# Patient Record
Sex: FEMALE | Race: WHITE | NOT HISPANIC OR LATINO | Employment: OTHER | URBAN - METROPOLITAN AREA
[De-identification: names, ages, dates, MRNs, and addresses within clinical notes are randomized per-mention and may not be internally consistent; named-entity substitution may affect disease eponyms.]

---

## 2017-01-05 ENCOUNTER — GENERIC CONVERSION - ENCOUNTER (OUTPATIENT)
Dept: OTHER | Facility: OTHER | Age: 57
End: 2017-01-05

## 2017-01-06 ENCOUNTER — GENERIC CONVERSION - ENCOUNTER (OUTPATIENT)
Dept: OTHER | Facility: OTHER | Age: 57
End: 2017-01-06

## 2017-02-14 ENCOUNTER — TRANSCRIBE ORDERS (OUTPATIENT)
Dept: ADMINISTRATIVE | Facility: HOSPITAL | Age: 57
End: 2017-02-14

## 2017-02-14 ENCOUNTER — APPOINTMENT (OUTPATIENT)
Dept: LAB | Facility: HOSPITAL | Age: 57
End: 2017-02-14
Payer: COMMERCIAL

## 2017-02-14 ENCOUNTER — GENERIC CONVERSION - ENCOUNTER (OUTPATIENT)
Dept: OTHER | Facility: OTHER | Age: 57
End: 2017-02-14

## 2017-02-14 ENCOUNTER — HOSPITAL ENCOUNTER (OUTPATIENT)
Dept: CT IMAGING | Facility: HOSPITAL | Age: 57
Discharge: HOME | End: 2017-02-14
Payer: COMMERCIAL

## 2017-02-14 ENCOUNTER — ALLSCRIPTS OFFICE VISIT (OUTPATIENT)
Dept: OTHER | Facility: OTHER | Age: 57
End: 2017-02-14

## 2017-02-14 DIAGNOSIS — R10.33 ABDOMINAL PAIN, PERIUMBILIC: Primary | ICD-10-CM

## 2017-02-14 DIAGNOSIS — R50.9 FEVER: ICD-10-CM

## 2017-02-14 DIAGNOSIS — R10.33 PERIUMBILICAL PAIN: ICD-10-CM

## 2017-02-14 DIAGNOSIS — R19.7 DIARRHEA: ICD-10-CM

## 2017-02-14 DIAGNOSIS — R10.33 ABDOMINAL PAIN, PERIUMBILIC: ICD-10-CM

## 2017-02-14 LAB
ALBUMIN SERPL BCP-MCNC: 3 G/DL (ref 3.5–5)
ALP SERPL-CCNC: 126 U/L (ref 46–116)
ALT SERPL W P-5'-P-CCNC: 49 U/L (ref 12–78)
AMYLASE SERPL-CCNC: 12 IU/L (ref 25–115)
ANION GAP SERPL CALCULATED.3IONS-SCNC: 9 MMOL/L (ref 4–13)
AST SERPL W P-5'-P-CCNC: 24 U/L (ref 5–45)
BASOPHILS # BLD AUTO: 0.05 THOUSANDS/ΜL (ref 0–0.1)
BASOPHILS NFR BLD AUTO: 0 % (ref 0–1)
BILIRUB SERPL-MCNC: 0.4 MG/DL (ref 0.2–1)
BUN SERPL-MCNC: 7 MG/DL (ref 5–25)
CALCIUM SERPL-MCNC: 8.5 MG/DL (ref 8.3–10.1)
CHLORIDE SERPL-SCNC: 93 MMOL/L (ref 100–108)
CO2 SERPL-SCNC: 32 MMOL/L (ref 21–32)
CREAT SERPL-MCNC: 0.73 MG/DL (ref 0.6–1.3)
EOSINOPHIL # BLD AUTO: 0.01 THOUSAND/ΜL (ref 0–0.61)
EOSINOPHIL NFR BLD AUTO: 0 % (ref 0–6)
ERYTHROCYTE [DISTWIDTH] IN BLOOD BY AUTOMATED COUNT: 17.1 % (ref 11.6–15.1)
ERYTHROCYTE [SEDIMENTATION RATE] IN BLOOD: 6 MM/HOUR (ref 0–15)
GFR SERPL CREATININE-BSD FRML MDRD: >60 ML/MIN/1.73SQ M
GLUCOSE SERPL-MCNC: 204 MG/DL (ref 65–140)
HCT VFR BLD AUTO: 35.5 % (ref 34.8–46.1)
HGB BLD-MCNC: 11.1 G/DL (ref 11.5–15.4)
LIPASE SERPL-CCNC: 64 U/L (ref 73–393)
LYMPHOCYTES # BLD AUTO: 0.77 THOUSANDS/ΜL (ref 0.6–4.47)
LYMPHOCYTES NFR BLD AUTO: 6 % (ref 14–44)
MCH RBC QN AUTO: 22.5 PG (ref 26.8–34.3)
MCHC RBC AUTO-ENTMCNC: 31.3 G/DL (ref 31.4–37.4)
MCV RBC AUTO: 72 FL (ref 82–98)
MONOCYTES # BLD AUTO: 0.58 THOUSAND/ΜL (ref 0.17–1.22)
MONOCYTES NFR BLD AUTO: 5 % (ref 4–12)
NEUTROPHILS # BLD AUTO: 10.57 THOUSANDS/ΜL (ref 1.85–7.62)
NEUTS SEG NFR BLD AUTO: 89 % (ref 43–75)
PLATELET # BLD AUTO: 246 THOUSANDS/UL (ref 149–390)
PMV BLD AUTO: 10.8 FL (ref 8.9–12.7)
POTASSIUM SERPL-SCNC: 3.1 MMOL/L (ref 3.5–5.3)
PROT SERPL-MCNC: 6.5 G/DL (ref 6.4–8.2)
RBC # BLD AUTO: 4.94 MILLION/UL (ref 3.81–5.12)
SODIUM SERPL-SCNC: 134 MMOL/L (ref 136–145)
WBC # BLD AUTO: 11.98 THOUSAND/UL (ref 4.31–10.16)

## 2017-02-14 PROCEDURE — 36415 COLL VENOUS BLD VENIPUNCTURE: CPT

## 2017-02-14 PROCEDURE — 85025 COMPLETE CBC W/AUTO DIFF WBC: CPT

## 2017-02-14 PROCEDURE — 74177 CT ABD & PELVIS W/CONTRAST: CPT

## 2017-02-14 PROCEDURE — 82150 ASSAY OF AMYLASE: CPT

## 2017-02-14 PROCEDURE — 85652 RBC SED RATE AUTOMATED: CPT

## 2017-02-14 PROCEDURE — 83690 ASSAY OF LIPASE: CPT

## 2017-02-14 PROCEDURE — 80053 COMPREHEN METABOLIC PANEL: CPT

## 2017-02-14 RX ADMIN — IOHEXOL 100 ML: 350 INJECTION, SOLUTION INTRAVENOUS at 16:02

## 2017-02-14 RX ADMIN — IOHEXOL 50 ML: 240 INJECTION, SOLUTION INTRATHECAL; INTRAVASCULAR; INTRAVENOUS; ORAL at 14:20

## 2017-02-22 ENCOUNTER — GENERIC CONVERSION - ENCOUNTER (OUTPATIENT)
Dept: OTHER | Facility: OTHER | Age: 57
End: 2017-02-22

## 2017-03-20 ENCOUNTER — GENERIC CONVERSION - ENCOUNTER (OUTPATIENT)
Dept: OTHER | Facility: OTHER | Age: 57
End: 2017-03-20

## 2017-03-21 ENCOUNTER — TRANSCRIBE ORDERS (OUTPATIENT)
Dept: ADMINISTRATIVE | Facility: HOSPITAL | Age: 57
End: 2017-03-21

## 2017-03-21 DIAGNOSIS — N63.0 LUMP OR MASS IN BREAST: Primary | ICD-10-CM

## 2017-03-21 DIAGNOSIS — Z12.31 SCREENING MAMMOGRAM FOR HIGH-RISK PATIENT: ICD-10-CM

## 2017-03-23 LAB
A/G RATIO (HISTORICAL): 2.2 (ref 1.2–2.2)
ALBUMIN SERPL BCP-MCNC: 4.2 G/DL (ref 3.5–5.5)
ALP SERPL-CCNC: 120 IU/L (ref 39–117)
ALT SERPL W P-5'-P-CCNC: 24 IU/L (ref 0–32)
AST SERPL W P-5'-P-CCNC: 20 IU/L (ref 0–40)
BASOPHILS # BLD AUTO: 0.1 X10E3/UL (ref 0–0.2)
BASOPHILS # BLD AUTO: 1 %
BILIRUB SERPL-MCNC: 0.4 MG/DL (ref 0–1.2)
BUN SERPL-MCNC: 5 MG/DL (ref 6–24)
BUN/CREA RATIO (HISTORICAL): 9 (ref 9–23)
CALCIUM SERPL-MCNC: 9.1 MG/DL (ref 8.7–10.2)
CHLORIDE SERPL-SCNC: 95 MMOL/L (ref 96–106)
CHOLEST SERPL-MCNC: 134 MG/DL (ref 100–199)
CHOLEST/HDLC SERPL: 2.7 RATIO UNITS (ref 0–4.4)
CO2 SERPL-SCNC: 26 MMOL/L (ref 18–29)
CREAT SERPL-MCNC: 0.58 MG/DL (ref 0.57–1)
CREATININE, URINE (HISTORICAL): 68.7 MG/DL
DEPRECATED RDW RBC AUTO: 19.1 % (ref 12.3–15.4)
EGFR AFRICAN AMERICAN (HISTORICAL): 117 ML/MIN/1.73
EGFR-AMERICAN CALC (HISTORICAL): 102 ML/MIN/1.73
EOSINOPHIL # BLD AUTO: 0.4 X10E3/UL (ref 0–0.4)
EOSINOPHIL # BLD AUTO: 7 %
GLUCOSE SERPL-MCNC: 323 MG/DL (ref 65–99)
HBA1C MFR BLD HPLC: 10.9 % (ref 4.8–5.6)
HCT VFR BLD AUTO: 40.3 % (ref 34–46.6)
HDLC SERPL-MCNC: 50 MG/DL
HGB BLD-MCNC: 12.1 G/DL (ref 11.1–15.9)
IMM.GRANULOCYTES (CD4/8) (HISTORICAL): 0 %
IMM.GRANULOCYTES (CD4/8) (HISTORICAL): 0 X10E3/UL (ref 0–0.1)
LDLC SERPL CALC-MCNC: 58 MG/DL (ref 0–99)
LYMPHOCYTES # BLD AUTO: 1.5 X10E3/UL (ref 0.7–3.1)
LYMPHOCYTES # BLD AUTO: 27 %
MCH RBC QN AUTO: 22.3 PG (ref 26.6–33)
MCHC RBC AUTO-ENTMCNC: 30 G/DL (ref 31.5–35.7)
MCV RBC AUTO: 74 FL (ref 79–97)
MICROALBUM.,U,RANDOM (HISTORICAL): 5.3 UG/ML
MICROALBUMIN/CREATININE RATIO (HISTORICAL): 7.7 MG/G CREAT (ref 0–30)
MONOCYTES # BLD AUTO: 0.6 X10E3/UL (ref 0.1–0.9)
MONOCYTES (HISTORICAL): 11 %
NEUTROPHILS # BLD AUTO: 2.9 X10E3/UL (ref 1.4–7)
NEUTROPHILS # BLD AUTO: 54 %
PLATELET # BLD AUTO: 289 X10E3/UL (ref 150–379)
POTASSIUM SERPL-SCNC: 4.2 MMOL/L (ref 3.5–5.2)
RBC (HISTORICAL): 5.43 X10E6/UL (ref 3.77–5.28)
SODIUM SERPL-SCNC: 140 MMOL/L (ref 134–144)
TOT. GLOBULIN, SERUM (HISTORICAL): 1.9 G/DL (ref 1.5–4.5)
TOTAL PROTEIN (HISTORICAL): 6.1 G/DL (ref 6–8.5)
TRIGL SERPL-MCNC: 132 MG/DL (ref 0–149)
VLDLC SERPL CALC-MCNC: 26 MG/DL (ref 5–40)
WBC # BLD AUTO: 5.5 X10E3/UL (ref 3.4–10.8)

## 2017-03-24 ENCOUNTER — HOSPITAL ENCOUNTER (OUTPATIENT)
Dept: MAMMOGRAPHY | Facility: CLINIC | Age: 57
Discharge: HOME | End: 2017-03-24
Payer: COMMERCIAL

## 2017-03-24 ENCOUNTER — GENERIC CONVERSION - ENCOUNTER (OUTPATIENT)
Dept: OTHER | Facility: OTHER | Age: 57
End: 2017-03-24

## 2017-03-24 ENCOUNTER — HOSPITAL ENCOUNTER (OUTPATIENT)
Dept: ULTRASOUND IMAGING | Facility: CLINIC | Age: 57
Discharge: HOME | End: 2017-03-24
Payer: COMMERCIAL

## 2017-03-24 DIAGNOSIS — Z12.31 SCREENING MAMMOGRAM FOR HIGH-RISK PATIENT: ICD-10-CM

## 2017-03-24 DIAGNOSIS — N63.0 BREAST LUMP: ICD-10-CM

## 2017-03-24 DIAGNOSIS — N63.0 LUMP OR MASS IN BREAST: ICD-10-CM

## 2017-03-24 PROCEDURE — 76642 ULTRASOUND BREAST LIMITED: CPT

## 2017-03-24 PROCEDURE — G0202 SCR MAMMO BI INCL CAD: HCPCS

## 2017-03-24 PROCEDURE — G0206 DX MAMMO INCL CAD UNI: HCPCS

## 2017-03-29 ENCOUNTER — ALLSCRIPTS OFFICE VISIT (OUTPATIENT)
Dept: OTHER | Facility: OTHER | Age: 57
End: 2017-03-29

## 2017-03-30 ENCOUNTER — GENERIC CONVERSION - ENCOUNTER (OUTPATIENT)
Dept: OTHER | Facility: OTHER | Age: 57
End: 2017-03-30

## 2017-04-03 LAB
ALLERGEN CAT EPITHELIUM-DANDER (HISTORICAL): <0.1 KU/L
ALLERGEN ELM (HISTORICAL): <0.1 KU/L
ALLERGEN MAPLE/BOX ELDER (HISTORICAL): <0.1 KU/L
ALLERGEN MUGWORT IGE (HISTORICAL): <0.1 KU/L
ALLERGEN OAK, WHITE (HISTORICAL): <0.1 KU/L
ALLERGEN ROUGH PIGWEED (W14) IGE (HISTORICAL): <0.1 KU/L
ALLERGEN SHEEP SORREL (W18) IGE (HISTORICAL): <0.1 KU/L
ALLERGEN WHITE MULBERRY (HISTORICAL): <0.1 KU/L
ALLERGEN, COMMON SILVER BIRCH (HISTORICAL): <0.1 KU/L
ALLERGEN, COTTONWOOD (HISTORICAL): <0.1 KU/L
ALTERNARIA ALTERNATA (HISTORICAL): <0.1 KU/L
ASPERGILLUS FUMIGATUS (HISTORICAL): <0.1 KU/L
BERMUDA GRASS (HISTORICAL): <0.1 KU/L
CLADOSPORIUM HERBARUM (HISTORICAL): <0.1 KU/L
CLASS (HISTORICAL): NORMAL
COCKROACH (HISTORICAL): <0.1 KU/L
COMMON RAGWEED (HISTORICAL): <0.1 KU/L
D. FARINAE (HISTORICAL): <0.1 KU/L
D. PTERONYSSINUS (HISTORICAL): <0.1 KU/L
DOG DANDER (HISTORICAL): <0.1 KU/L
IMMUNOGLOBULIN E, TOTAL (HISTORICAL): 31 IU/ML (ref 0–100)
MOUNTAIN CEDAR TREE (HISTORICAL): <0.1 KU/L
MOUSE URINE (HISTORICAL): <0.1 KU/L
PENICILLIUM CHRYSOGENUM (HISTORICAL): <0.1 KU/L
SYCAMORE TREE (HISTORICAL): <0.1 KU/L
TIMOTHY GRASS (HISTORICAL): <0.1 KU/L
WALNUT (HISTORICAL): <0.1 KU/L
WHITE ASH TREE (HISTORICAL): <0.1 KU/L

## 2017-04-10 ENCOUNTER — GENERIC CONVERSION - ENCOUNTER (OUTPATIENT)
Dept: OTHER | Facility: OTHER | Age: 57
End: 2017-04-10

## 2017-04-24 ENCOUNTER — GENERIC CONVERSION - ENCOUNTER (OUTPATIENT)
Dept: OTHER | Facility: OTHER | Age: 57
End: 2017-04-24

## 2017-05-04 ENCOUNTER — GENERIC CONVERSION - ENCOUNTER (OUTPATIENT)
Dept: OTHER | Facility: OTHER | Age: 57
End: 2017-05-04

## 2017-06-08 ENCOUNTER — GENERIC CONVERSION - ENCOUNTER (OUTPATIENT)
Dept: OTHER | Facility: OTHER | Age: 57
End: 2017-06-08

## 2017-06-15 ENCOUNTER — APPOINTMENT (OUTPATIENT)
Dept: PHYSICAL THERAPY | Facility: CLINIC | Age: 57
End: 2017-06-15
Payer: COMMERCIAL

## 2017-06-15 PROCEDURE — 97162 PT EVAL MOD COMPLEX 30 MIN: CPT

## 2017-06-15 PROCEDURE — 97140 MANUAL THERAPY 1/> REGIONS: CPT

## 2017-06-15 PROCEDURE — 97110 THERAPEUTIC EXERCISES: CPT

## 2017-06-20 ENCOUNTER — APPOINTMENT (OUTPATIENT)
Dept: PHYSICAL THERAPY | Facility: CLINIC | Age: 57
End: 2017-06-20
Payer: COMMERCIAL

## 2017-06-20 PROCEDURE — 97530 THERAPEUTIC ACTIVITIES: CPT

## 2017-06-20 PROCEDURE — 97140 MANUAL THERAPY 1/> REGIONS: CPT

## 2017-06-20 PROCEDURE — 97010 HOT OR COLD PACKS THERAPY: CPT

## 2017-06-22 ENCOUNTER — APPOINTMENT (OUTPATIENT)
Dept: PHYSICAL THERAPY | Facility: CLINIC | Age: 57
End: 2017-06-22
Payer: COMMERCIAL

## 2017-06-22 PROCEDURE — 97140 MANUAL THERAPY 1/> REGIONS: CPT

## 2017-06-22 PROCEDURE — 97110 THERAPEUTIC EXERCISES: CPT

## 2017-06-23 ENCOUNTER — ALLSCRIPTS OFFICE VISIT (OUTPATIENT)
Dept: OTHER | Facility: OTHER | Age: 57
End: 2017-06-23

## 2017-06-26 LAB
A/G RATIO (HISTORICAL): 1.9 (ref 1.2–2.2)
ALBUMIN SERPL BCP-MCNC: 4.4 G/DL (ref 3.5–5.5)
ALP SERPL-CCNC: 124 IU/L (ref 39–117)
ALT SERPL W P-5'-P-CCNC: 35 IU/L (ref 0–32)
AST SERPL W P-5'-P-CCNC: 13 IU/L (ref 0–40)
BASOPHILS # BLD AUTO: 0.1 X10E3/UL (ref 0–0.2)
BASOPHILS # BLD AUTO: 1 %
BILIRUB SERPL-MCNC: 0.4 MG/DL (ref 0–1.2)
BUN SERPL-MCNC: 8 MG/DL (ref 6–24)
BUN/CREA RATIO (HISTORICAL): 14 (ref 9–23)
CALCIUM SERPL-MCNC: 9.5 MG/DL (ref 8.7–10.2)
CHLORIDE SERPL-SCNC: 95 MMOL/L (ref 96–106)
CO2 SERPL-SCNC: 27 MMOL/L (ref 18–29)
CREAT SERPL-MCNC: 0.56 MG/DL (ref 0.57–1)
DEPRECATED RDW RBC AUTO: 14.9 % (ref 12.3–15.4)
EGFR AFRICAN AMERICAN (HISTORICAL): 119 ML/MIN/1.73
EGFR-AMERICAN CALC (HISTORICAL): 103 ML/MIN/1.73
EOSINOPHIL # BLD AUTO: 0.5 X10E3/UL (ref 0–0.4)
EOSINOPHIL # BLD AUTO: 5 %
GLUCOSE SERPL-MCNC: 165 MG/DL (ref 65–99)
HBA1C MFR BLD HPLC: 9 % (ref 4.8–5.6)
HCT VFR BLD AUTO: 39.2 % (ref 34–46.6)
HGB BLD-MCNC: 12.9 G/DL (ref 11.1–15.9)
IMM.GRANULOCYTES (CD4/8) (HISTORICAL): 0 %
IMM.GRANULOCYTES (CD4/8) (HISTORICAL): 0 X10E3/UL (ref 0–0.1)
LYMPHOCYTES # BLD AUTO: 1.9 X10E3/UL (ref 0.7–3.1)
LYMPHOCYTES # BLD AUTO: 20 %
MCH RBC QN AUTO: 25 PG (ref 26.6–33)
MCHC RBC AUTO-ENTMCNC: 32.9 G/DL (ref 31.5–35.7)
MCV RBC AUTO: 76 FL (ref 79–97)
MONOCYTES # BLD AUTO: 0.6 X10E3/UL (ref 0.1–0.9)
MONOCYTES (HISTORICAL): 6 %
NEUTROPHILS # BLD AUTO: 6.6 X10E3/UL (ref 1.4–7)
NEUTROPHILS # BLD AUTO: 68 %
PLATELET # BLD AUTO: 365 X10E3/UL (ref 150–379)
POTASSIUM SERPL-SCNC: 4.2 MMOL/L (ref 3.5–5.2)
RBC (HISTORICAL): 5.17 X10E6/UL (ref 3.77–5.28)
SODIUM SERPL-SCNC: 140 MMOL/L (ref 134–144)
TOT. GLOBULIN, SERUM (HISTORICAL): 2.3 G/DL (ref 1.5–4.5)
TOTAL PROTEIN (HISTORICAL): 6.7 G/DL (ref 6–8.5)
WBC # BLD AUTO: 9.7 X10E3/UL (ref 3.4–10.8)

## 2017-06-27 ENCOUNTER — APPOINTMENT (OUTPATIENT)
Dept: PHYSICAL THERAPY | Facility: CLINIC | Age: 57
End: 2017-06-27
Payer: COMMERCIAL

## 2017-06-27 PROCEDURE — G0283 ELEC STIM OTHER THAN WOUND: HCPCS

## 2017-06-27 PROCEDURE — 97140 MANUAL THERAPY 1/> REGIONS: CPT

## 2017-06-27 PROCEDURE — 97110 THERAPEUTIC EXERCISES: CPT

## 2017-06-27 PROCEDURE — 97014 ELECTRIC STIMULATION THERAPY: CPT

## 2017-06-29 ENCOUNTER — APPOINTMENT (OUTPATIENT)
Dept: PHYSICAL THERAPY | Facility: CLINIC | Age: 57
End: 2017-06-29
Payer: COMMERCIAL

## 2017-07-03 ENCOUNTER — APPOINTMENT (OUTPATIENT)
Dept: PHYSICAL THERAPY | Facility: CLINIC | Age: 57
End: 2017-07-03
Payer: COMMERCIAL

## 2017-07-05 ENCOUNTER — GENERIC CONVERSION - ENCOUNTER (OUTPATIENT)
Dept: OTHER | Facility: OTHER | Age: 57
End: 2017-07-05

## 2017-07-06 ENCOUNTER — APPOINTMENT (OUTPATIENT)
Dept: PHYSICAL THERAPY | Facility: CLINIC | Age: 57
End: 2017-07-06
Payer: COMMERCIAL

## 2017-07-06 PROCEDURE — 97110 THERAPEUTIC EXERCISES: CPT

## 2017-07-06 PROCEDURE — 97014 ELECTRIC STIMULATION THERAPY: CPT

## 2017-07-06 PROCEDURE — 97010 HOT OR COLD PACKS THERAPY: CPT

## 2017-07-06 PROCEDURE — G0283 ELEC STIM OTHER THAN WOUND: HCPCS

## 2017-07-06 PROCEDURE — 97140 MANUAL THERAPY 1/> REGIONS: CPT

## 2017-07-07 ENCOUNTER — ALLSCRIPTS OFFICE VISIT (OUTPATIENT)
Dept: OTHER | Facility: OTHER | Age: 57
End: 2017-07-07

## 2017-07-11 ENCOUNTER — APPOINTMENT (OUTPATIENT)
Dept: PHYSICAL THERAPY | Facility: CLINIC | Age: 57
End: 2017-07-11
Payer: COMMERCIAL

## 2017-07-11 ENCOUNTER — GENERIC CONVERSION - ENCOUNTER (OUTPATIENT)
Dept: OTHER | Facility: OTHER | Age: 57
End: 2017-07-11

## 2017-07-11 PROCEDURE — 97010 HOT OR COLD PACKS THERAPY: CPT

## 2017-07-11 PROCEDURE — G0283 ELEC STIM OTHER THAN WOUND: HCPCS

## 2017-07-11 PROCEDURE — 97110 THERAPEUTIC EXERCISES: CPT

## 2017-07-11 PROCEDURE — 97140 MANUAL THERAPY 1/> REGIONS: CPT

## 2017-07-11 PROCEDURE — 97014 ELECTRIC STIMULATION THERAPY: CPT

## 2017-07-13 ENCOUNTER — APPOINTMENT (OUTPATIENT)
Dept: PHYSICAL THERAPY | Facility: CLINIC | Age: 57
End: 2017-07-13
Payer: COMMERCIAL

## 2017-07-13 PROCEDURE — 97140 MANUAL THERAPY 1/> REGIONS: CPT

## 2017-07-13 PROCEDURE — 97110 THERAPEUTIC EXERCISES: CPT

## 2017-07-15 ENCOUNTER — GENERIC CONVERSION - ENCOUNTER (OUTPATIENT)
Dept: OTHER | Facility: OTHER | Age: 57
End: 2017-07-15

## 2017-07-18 ENCOUNTER — APPOINTMENT (OUTPATIENT)
Dept: PHYSICAL THERAPY | Facility: CLINIC | Age: 57
End: 2017-07-18
Payer: COMMERCIAL

## 2017-07-18 PROCEDURE — 97110 THERAPEUTIC EXERCISES: CPT

## 2017-07-18 PROCEDURE — 97140 MANUAL THERAPY 1/> REGIONS: CPT

## 2017-07-19 ENCOUNTER — GENERIC CONVERSION - ENCOUNTER (OUTPATIENT)
Dept: OTHER | Facility: OTHER | Age: 57
End: 2017-07-19

## 2017-07-20 ENCOUNTER — APPOINTMENT (OUTPATIENT)
Dept: PHYSICAL THERAPY | Facility: CLINIC | Age: 57
End: 2017-07-20
Payer: COMMERCIAL

## 2017-07-20 PROCEDURE — 97140 MANUAL THERAPY 1/> REGIONS: CPT

## 2017-07-20 PROCEDURE — 97110 THERAPEUTIC EXERCISES: CPT

## 2017-07-20 PROCEDURE — 97010 HOT OR COLD PACKS THERAPY: CPT

## 2017-07-25 ENCOUNTER — APPOINTMENT (OUTPATIENT)
Dept: PHYSICAL THERAPY | Facility: CLINIC | Age: 57
End: 2017-07-25
Payer: COMMERCIAL

## 2017-07-26 ENCOUNTER — APPOINTMENT (OUTPATIENT)
Dept: PHYSICAL THERAPY | Facility: CLINIC | Age: 57
End: 2017-07-26
Payer: COMMERCIAL

## 2017-07-26 PROCEDURE — 97110 THERAPEUTIC EXERCISES: CPT

## 2017-07-26 PROCEDURE — 97140 MANUAL THERAPY 1/> REGIONS: CPT

## 2017-07-27 ENCOUNTER — APPOINTMENT (OUTPATIENT)
Dept: PHYSICAL THERAPY | Facility: CLINIC | Age: 57
End: 2017-07-27
Payer: COMMERCIAL

## 2017-07-27 PROCEDURE — 97110 THERAPEUTIC EXERCISES: CPT

## 2017-07-27 PROCEDURE — 97140 MANUAL THERAPY 1/> REGIONS: CPT

## 2017-07-27 PROCEDURE — 97010 HOT OR COLD PACKS THERAPY: CPT

## 2017-08-01 ENCOUNTER — APPOINTMENT (OUTPATIENT)
Dept: PHYSICAL THERAPY | Facility: CLINIC | Age: 57
End: 2017-08-01
Payer: COMMERCIAL

## 2017-08-01 ENCOUNTER — GENERIC CONVERSION - ENCOUNTER (OUTPATIENT)
Dept: OTHER | Facility: OTHER | Age: 57
End: 2017-08-01

## 2017-08-03 ENCOUNTER — APPOINTMENT (OUTPATIENT)
Dept: PHYSICAL THERAPY | Facility: CLINIC | Age: 57
End: 2017-08-03
Payer: COMMERCIAL

## 2017-08-04 ENCOUNTER — GENERIC CONVERSION - ENCOUNTER (OUTPATIENT)
Dept: OTHER | Facility: OTHER | Age: 57
End: 2017-08-04

## 2017-08-07 ENCOUNTER — ALLSCRIPTS OFFICE VISIT (OUTPATIENT)
Dept: OTHER | Facility: OTHER | Age: 57
End: 2017-08-07

## 2017-08-07 DIAGNOSIS — E11.9 TYPE 2 DIABETES MELLITUS WITHOUT COMPLICATIONS (HCC): ICD-10-CM

## 2017-08-08 ENCOUNTER — APPOINTMENT (OUTPATIENT)
Dept: PHYSICAL THERAPY | Facility: CLINIC | Age: 57
End: 2017-08-08
Payer: COMMERCIAL

## 2017-08-08 PROCEDURE — 97110 THERAPEUTIC EXERCISES: CPT

## 2017-08-08 PROCEDURE — 97010 HOT OR COLD PACKS THERAPY: CPT

## 2017-08-08 PROCEDURE — 97140 MANUAL THERAPY 1/> REGIONS: CPT

## 2017-08-10 ENCOUNTER — APPOINTMENT (OUTPATIENT)
Dept: PHYSICAL THERAPY | Facility: CLINIC | Age: 57
End: 2017-08-10
Payer: COMMERCIAL

## 2017-08-10 PROCEDURE — 97010 HOT OR COLD PACKS THERAPY: CPT

## 2017-08-10 PROCEDURE — 97140 MANUAL THERAPY 1/> REGIONS: CPT

## 2017-08-10 PROCEDURE — 97110 THERAPEUTIC EXERCISES: CPT

## 2017-08-15 ENCOUNTER — APPOINTMENT (OUTPATIENT)
Dept: PHYSICAL THERAPY | Facility: CLINIC | Age: 57
End: 2017-08-15
Payer: COMMERCIAL

## 2017-08-15 PROCEDURE — 97110 THERAPEUTIC EXERCISES: CPT

## 2017-08-15 PROCEDURE — 97140 MANUAL THERAPY 1/> REGIONS: CPT

## 2017-08-15 PROCEDURE — 97010 HOT OR COLD PACKS THERAPY: CPT

## 2017-08-17 ENCOUNTER — APPOINTMENT (OUTPATIENT)
Dept: PHYSICAL THERAPY | Facility: CLINIC | Age: 57
End: 2017-08-17
Payer: COMMERCIAL

## 2017-08-17 PROCEDURE — 97140 MANUAL THERAPY 1/> REGIONS: CPT

## 2017-08-17 PROCEDURE — 97010 HOT OR COLD PACKS THERAPY: CPT

## 2017-08-17 PROCEDURE — 97110 THERAPEUTIC EXERCISES: CPT

## 2017-08-22 ENCOUNTER — APPOINTMENT (OUTPATIENT)
Dept: PHYSICAL THERAPY | Facility: CLINIC | Age: 57
End: 2017-08-22
Payer: COMMERCIAL

## 2017-08-24 ENCOUNTER — APPOINTMENT (OUTPATIENT)
Dept: PHYSICAL THERAPY | Facility: CLINIC | Age: 57
End: 2017-08-24
Payer: COMMERCIAL

## 2017-08-24 PROCEDURE — G0283 ELEC STIM OTHER THAN WOUND: HCPCS

## 2017-08-24 PROCEDURE — 97010 HOT OR COLD PACKS THERAPY: CPT

## 2017-08-24 PROCEDURE — 97110 THERAPEUTIC EXERCISES: CPT

## 2017-08-24 PROCEDURE — 97140 MANUAL THERAPY 1/> REGIONS: CPT

## 2017-08-24 PROCEDURE — 97014 ELECTRIC STIMULATION THERAPY: CPT

## 2017-08-29 ENCOUNTER — APPOINTMENT (OUTPATIENT)
Dept: PHYSICAL THERAPY | Facility: CLINIC | Age: 57
End: 2017-08-29
Payer: COMMERCIAL

## 2017-08-30 ENCOUNTER — ALLSCRIPTS OFFICE VISIT (OUTPATIENT)
Dept: OTHER | Facility: OTHER | Age: 57
End: 2017-08-30

## 2017-08-31 ENCOUNTER — APPOINTMENT (OUTPATIENT)
Dept: PHYSICAL THERAPY | Facility: CLINIC | Age: 57
End: 2017-08-31
Payer: COMMERCIAL

## 2017-08-31 PROCEDURE — 97140 MANUAL THERAPY 1/> REGIONS: CPT

## 2017-08-31 PROCEDURE — 97110 THERAPEUTIC EXERCISES: CPT

## 2017-08-31 PROCEDURE — 97010 HOT OR COLD PACKS THERAPY: CPT

## 2017-09-05 ENCOUNTER — APPOINTMENT (OUTPATIENT)
Dept: PHYSICAL THERAPY | Facility: CLINIC | Age: 57
End: 2017-09-05
Payer: COMMERCIAL

## 2017-09-07 ENCOUNTER — APPOINTMENT (OUTPATIENT)
Dept: PHYSICAL THERAPY | Facility: CLINIC | Age: 57
End: 2017-09-07
Payer: COMMERCIAL

## 2017-09-07 ENCOUNTER — APPOINTMENT (EMERGENCY)
Dept: CT IMAGING | Facility: HOSPITAL | Age: 57
End: 2017-09-07
Payer: COMMERCIAL

## 2017-09-07 ENCOUNTER — HOSPITAL ENCOUNTER (EMERGENCY)
Facility: HOSPITAL | Age: 57
Discharge: HOME | End: 2017-09-07
Attending: EMERGENCY MEDICINE
Payer: COMMERCIAL

## 2017-09-07 ENCOUNTER — APPOINTMENT (EMERGENCY)
Dept: RADIOLOGY | Facility: HOSPITAL | Age: 57
End: 2017-09-07
Payer: COMMERCIAL

## 2017-09-07 VITALS
HEART RATE: 76 BPM | HEIGHT: 63 IN | DIASTOLIC BLOOD PRESSURE: 56 MMHG | SYSTOLIC BLOOD PRESSURE: 112 MMHG | BODY MASS INDEX: 38.09 KG/M2 | OXYGEN SATURATION: 97 % | TEMPERATURE: 98.4 F | WEIGHT: 215 LBS | RESPIRATION RATE: 18 BRPM

## 2017-09-07 DIAGNOSIS — M54.2 NECK PAIN: ICD-10-CM

## 2017-09-07 DIAGNOSIS — R53.1 WEAKNESS: Primary | ICD-10-CM

## 2017-09-07 DIAGNOSIS — R42 DIZZINESS: ICD-10-CM

## 2017-09-07 LAB
ALBUMIN SERPL BCP-MCNC: 3.8 G/DL (ref 3.5–5)
ALP SERPL-CCNC: 122 U/L (ref 46–116)
ALT SERPL W P-5'-P-CCNC: 27 U/L (ref 12–78)
ANION GAP SERPL CALCULATED.3IONS-SCNC: 8 MMOL/L (ref 4–13)
APTT PPP: 29 SECONDS (ref 23–35)
AST SERPL W P-5'-P-CCNC: 14 U/L (ref 5–45)
BASOPHILS # BLD AUTO: 0.05 THOUSANDS/ΜL (ref 0–0.1)
BASOPHILS NFR BLD AUTO: 1 % (ref 0–1)
BILIRUB SERPL-MCNC: 0.3 MG/DL (ref 0.2–1)
BUN SERPL-MCNC: 16 MG/DL (ref 5–25)
CALCIUM SERPL-MCNC: 9.1 MG/DL (ref 8.3–10.1)
CHLORIDE SERPL-SCNC: 99 MMOL/L (ref 100–108)
CK SERPL-CCNC: 39 U/L (ref 26–192)
CO2 SERPL-SCNC: 32 MMOL/L (ref 21–32)
CORTIS SERPL-MCNC: 6.8 UG/DL
CREAT SERPL-MCNC: 0.88 MG/DL (ref 0.6–1.3)
EOSINOPHIL # BLD AUTO: 0.52 THOUSAND/ΜL (ref 0–0.61)
EOSINOPHIL NFR BLD AUTO: 5 % (ref 0–6)
ERYTHROCYTE [DISTWIDTH] IN BLOOD BY AUTOMATED COUNT: 16.7 % (ref 11.6–15.1)
ERYTHROCYTE [SEDIMENTATION RATE] IN BLOOD: 2 MM/HOUR (ref 0–15)
GFR SERPL CREATININE-BSD FRML MDRD: 73 ML/MIN/1.73SQ M
GLUCOSE SERPL-MCNC: 104 MG/DL (ref 65–140)
HCT VFR BLD AUTO: 39.6 % (ref 34.8–46.1)
HGB BLD-MCNC: 13 G/DL (ref 11.5–15.4)
INR PPP: 1 (ref 0.86–1.16)
LYMPHOCYTES # BLD AUTO: 2.72 THOUSANDS/ΜL (ref 0.6–4.47)
LYMPHOCYTES NFR BLD AUTO: 25 % (ref 14–44)
MAGNESIUM SERPL-MCNC: 2.1 MG/DL (ref 1.6–2.6)
MCH RBC QN AUTO: 24.7 PG (ref 26.8–34.3)
MCHC RBC AUTO-ENTMCNC: 32.8 G/DL (ref 31.4–37.4)
MCV RBC AUTO: 75 FL (ref 82–98)
MONOCYTES # BLD AUTO: 0.87 THOUSAND/ΜL (ref 0.17–1.22)
MONOCYTES NFR BLD AUTO: 8 % (ref 4–12)
NEUTROPHILS # BLD AUTO: 6.6 THOUSANDS/ΜL (ref 1.85–7.62)
NEUTS SEG NFR BLD AUTO: 61 % (ref 43–75)
PHOSPHATE SERPL-MCNC: 4.9 MG/DL (ref 2.7–4.5)
PLATELET # BLD AUTO: 312 THOUSANDS/UL (ref 149–390)
PMV BLD AUTO: 10.8 FL (ref 8.9–12.7)
POTASSIUM SERPL-SCNC: 3.7 MMOL/L (ref 3.5–5.3)
PROT SERPL-MCNC: 7.2 G/DL (ref 6.4–8.2)
PROTHROMBIN TIME: 13 SECONDS (ref 12.1–14.4)
RBC # BLD AUTO: 5.27 MILLION/UL (ref 3.81–5.12)
SODIUM SERPL-SCNC: 139 MMOL/L (ref 136–145)
T4 FREE SERPL-MCNC: 1.14 NG/DL (ref 0.76–1.46)
TSH SERPL DL<=0.05 MIU/L-ACNC: 1.52 UIU/ML (ref 0.36–3.74)
WBC # BLD AUTO: 10.76 THOUSAND/UL (ref 4.31–10.16)

## 2017-09-07 PROCEDURE — 36415 COLL VENOUS BLD VENIPUNCTURE: CPT | Performed by: EMERGENCY MEDICINE

## 2017-09-07 PROCEDURE — 83735 ASSAY OF MAGNESIUM: CPT | Performed by: EMERGENCY MEDICINE

## 2017-09-07 PROCEDURE — 93005 ELECTROCARDIOGRAM TRACING: CPT | Performed by: EMERGENCY MEDICINE

## 2017-09-07 PROCEDURE — 72125 CT NECK SPINE W/O DYE: CPT

## 2017-09-07 PROCEDURE — 99285 EMERGENCY DEPT VISIT HI MDM: CPT

## 2017-09-07 PROCEDURE — 80053 COMPREHEN METABOLIC PANEL: CPT | Performed by: EMERGENCY MEDICINE

## 2017-09-07 PROCEDURE — 71010 HB CHEST X-RAY 1 VIEW FRONTAL (PORTABLE): CPT

## 2017-09-07 PROCEDURE — 70450 CT HEAD/BRAIN W/O DYE: CPT

## 2017-09-07 PROCEDURE — 85025 COMPLETE CBC W/AUTO DIFF WBC: CPT | Performed by: EMERGENCY MEDICINE

## 2017-09-07 PROCEDURE — 84100 ASSAY OF PHOSPHORUS: CPT | Performed by: EMERGENCY MEDICINE

## 2017-09-07 PROCEDURE — 85730 THROMBOPLASTIN TIME PARTIAL: CPT | Performed by: EMERGENCY MEDICINE

## 2017-09-07 PROCEDURE — 82550 ASSAY OF CK (CPK): CPT | Performed by: EMERGENCY MEDICINE

## 2017-09-07 PROCEDURE — 85610 PROTHROMBIN TIME: CPT | Performed by: EMERGENCY MEDICINE

## 2017-09-07 PROCEDURE — 85652 RBC SED RATE AUTOMATED: CPT | Performed by: EMERGENCY MEDICINE

## 2017-09-07 PROCEDURE — 82533 TOTAL CORTISOL: CPT | Performed by: EMERGENCY MEDICINE

## 2017-09-07 PROCEDURE — 86618 LYME DISEASE ANTIBODY: CPT | Performed by: EMERGENCY MEDICINE

## 2017-09-07 PROCEDURE — 84443 ASSAY THYROID STIM HORMONE: CPT | Performed by: EMERGENCY MEDICINE

## 2017-09-07 PROCEDURE — 84439 ASSAY OF FREE THYROXINE: CPT | Performed by: EMERGENCY MEDICINE

## 2017-09-08 LAB
ATRIAL RATE: 71 BPM
B BURGDOR IGG SER IA-ACNC: 0.1
B BURGDOR IGM SER IA-ACNC: 0.14
P AXIS: 81 DEGREES
PR INTERVAL: 158 MS
QRS AXIS: 61 DEGREES
QRSD INTERVAL: 78 MS
QT INTERVAL: 420 MS
QTC INTERVAL: 456 MS
T WAVE AXIS: 70 DEGREES
VENTRICULAR RATE: 71 BPM

## 2017-09-13 ENCOUNTER — GENERIC CONVERSION - ENCOUNTER (OUTPATIENT)
Dept: OTHER | Facility: OTHER | Age: 57
End: 2017-09-13

## 2017-09-13 DIAGNOSIS — M54.12 RADICULOPATHY OF CERVICAL REGION: ICD-10-CM

## 2017-09-15 ENCOUNTER — TRANSCRIBE ORDERS (OUTPATIENT)
Dept: ADMINISTRATIVE | Facility: HOSPITAL | Age: 57
End: 2017-09-15

## 2017-09-15 ENCOUNTER — HOSPITAL ENCOUNTER (OUTPATIENT)
Dept: RADIOLOGY | Facility: HOSPITAL | Age: 57
Discharge: HOME | End: 2017-09-15
Payer: COMMERCIAL

## 2017-09-15 DIAGNOSIS — M54.12 RADICULOPATHY OF CERVICAL REGION: ICD-10-CM

## 2017-09-15 PROCEDURE — 72050 X-RAY EXAM NECK SPINE 4/5VWS: CPT

## 2017-09-18 ENCOUNTER — GENERIC CONVERSION - ENCOUNTER (OUTPATIENT)
Dept: OTHER | Facility: OTHER | Age: 57
End: 2017-09-18

## 2017-09-18 ENCOUNTER — APPOINTMENT (OUTPATIENT)
Dept: PHYSICAL THERAPY | Facility: CLINIC | Age: 57
End: 2017-09-18
Payer: COMMERCIAL

## 2017-09-18 PROCEDURE — G8991 OTHER PT/OT GOAL STATUS: HCPCS

## 2017-09-18 PROCEDURE — 97140 MANUAL THERAPY 1/> REGIONS: CPT

## 2017-09-18 PROCEDURE — G8990 OTHER PT/OT CURRENT STATUS: HCPCS

## 2017-09-18 PROCEDURE — 97035 APP MDLTY 1+ULTRASOUND EA 15: CPT

## 2017-09-18 PROCEDURE — 97164 PT RE-EVAL EST PLAN CARE: CPT

## 2017-09-18 PROCEDURE — 97110 THERAPEUTIC EXERCISES: CPT

## 2017-09-20 ENCOUNTER — GENERIC CONVERSION - ENCOUNTER (OUTPATIENT)
Dept: OTHER | Facility: OTHER | Age: 57
End: 2017-09-20

## 2017-09-20 ENCOUNTER — ALLSCRIPTS OFFICE VISIT (OUTPATIENT)
Dept: OTHER | Facility: OTHER | Age: 57
End: 2017-09-20

## 2017-09-22 LAB
BASOPHILS # BLD AUTO: 0.1 X10E3/UL (ref 0–0.2)
BASOPHILS # BLD AUTO: 1 %
DEPRECATED RDW RBC AUTO: 16.6 % (ref 12.3–15.4)
EOSINOPHIL # BLD AUTO: 0.6 X10E3/UL (ref 0–0.4)
EOSINOPHIL # BLD AUTO: 6 %
HBA1C MFR BLD HPLC: 6.8 % (ref 4.8–5.6)
HCT VFR BLD AUTO: 39.7 % (ref 34–46.6)
HGB BLD-MCNC: 13 G/DL (ref 11.1–15.9)
IMM.GRANULOCYTES (CD4/8) (HISTORICAL): 0 %
IMM.GRANULOCYTES (CD4/8) (HISTORICAL): 0 X10E3/UL (ref 0–0.1)
LYMPHOCYTES # BLD AUTO: 2.3 X10E3/UL (ref 0.7–3.1)
LYMPHOCYTES # BLD AUTO: 26 %
MCH RBC QN AUTO: 24.7 PG (ref 26.6–33)
MCHC RBC AUTO-ENTMCNC: 32.7 G/DL (ref 31.5–35.7)
MCV RBC AUTO: 75 FL (ref 79–97)
MONOCYTES # BLD AUTO: 0.6 X10E3/UL (ref 0.1–0.9)
MONOCYTES (HISTORICAL): 7 %
NEUTROPHILS # BLD AUTO: 5.4 X10E3/UL (ref 1.4–7)
NEUTROPHILS # BLD AUTO: 60 %
PLATELET # BLD AUTO: 286 X10E3/UL (ref 150–379)
RBC (HISTORICAL): 5.27 X10E6/UL (ref 3.77–5.28)
WBC # BLD AUTO: 8.9 X10E3/UL (ref 3.4–10.8)

## 2017-09-23 LAB
A/G RATIO (HISTORICAL): 1.8 (ref 1.2–2.2)
ALBUMIN SERPL BCP-MCNC: 4.2 G/DL (ref 3.5–5.5)
ALP SERPL-CCNC: 199 IU/L (ref 39–117)
ALT SERPL W P-5'-P-CCNC: 183 IU/L (ref 0–32)
AST SERPL W P-5'-P-CCNC: 71 IU/L (ref 0–40)
BILIRUB SERPL-MCNC: 0.4 MG/DL (ref 0–1.2)
BUN SERPL-MCNC: 11 MG/DL (ref 6–24)
BUN/CREA RATIO (HISTORICAL): 20 (ref 9–23)
CALCIUM SERPL-MCNC: 9.6 MG/DL (ref 8.7–10.2)
CHLORIDE SERPL-SCNC: 95 MMOL/L (ref 96–106)
CHOLEST SERPL-MCNC: 150 MG/DL (ref 100–199)
CHOLEST/HDLC SERPL: 2.1 RATIO UNITS (ref 0–4.4)
CO2 SERPL-SCNC: 25 MMOL/L (ref 18–29)
CREAT SERPL-MCNC: 0.54 MG/DL (ref 0.57–1)
CREATININE, URINE (HISTORICAL): 54.1 MG/DL
EGFR AFRICAN AMERICAN (HISTORICAL): 120 ML/MIN/1.73
EGFR-AMERICAN CALC (HISTORICAL): 104 ML/MIN/1.73
GLUCOSE SERPL-MCNC: 131 MG/DL (ref 65–99)
HDLC SERPL-MCNC: 73 MG/DL
LDLC SERPL CALC-MCNC: 61 MG/DL (ref 0–99)
MICROALBUM.,U,RANDOM (HISTORICAL): 3.6 UG/ML
MICROALBUMIN/CREATININE RATIO (HISTORICAL): 6.7 MG/G CREAT (ref 0–30)
POTASSIUM SERPL-SCNC: 4.3 MMOL/L (ref 3.5–5.2)
SODIUM SERPL-SCNC: 138 MMOL/L (ref 134–144)
TOT. GLOBULIN, SERUM (HISTORICAL): 2.4 G/DL (ref 1.5–4.5)
TOTAL PROTEIN (HISTORICAL): 6.6 G/DL (ref 6–8.5)
TRIGL SERPL-MCNC: 81 MG/DL (ref 0–149)
VLDLC SERPL CALC-MCNC: 16 MG/DL (ref 5–40)

## 2017-09-25 ENCOUNTER — APPOINTMENT (OUTPATIENT)
Dept: PHYSICAL THERAPY | Facility: CLINIC | Age: 57
End: 2017-09-25
Payer: COMMERCIAL

## 2017-09-25 PROCEDURE — 97010 HOT OR COLD PACKS THERAPY: CPT

## 2017-09-25 PROCEDURE — 97110 THERAPEUTIC EXERCISES: CPT

## 2017-09-25 PROCEDURE — 97140 MANUAL THERAPY 1/> REGIONS: CPT

## 2017-09-27 ENCOUNTER — GENERIC CONVERSION - ENCOUNTER (OUTPATIENT)
Dept: OTHER | Facility: OTHER | Age: 57
End: 2017-09-27

## 2017-10-03 ENCOUNTER — APPOINTMENT (OUTPATIENT)
Dept: PHYSICAL THERAPY | Facility: CLINIC | Age: 57
End: 2017-10-03
Payer: COMMERCIAL

## 2017-10-04 ENCOUNTER — APPOINTMENT (OUTPATIENT)
Dept: PHYSICAL THERAPY | Facility: CLINIC | Age: 57
End: 2017-10-04
Payer: COMMERCIAL

## 2017-10-04 PROCEDURE — 97140 MANUAL THERAPY 1/> REGIONS: CPT

## 2017-10-04 PROCEDURE — 97164 PT RE-EVAL EST PLAN CARE: CPT

## 2017-10-04 PROCEDURE — G8981 BODY POS CURRENT STATUS: HCPCS

## 2017-10-04 PROCEDURE — G8982 BODY POS GOAL STATUS: HCPCS

## 2017-10-05 ENCOUNTER — GENERIC CONVERSION - ENCOUNTER (OUTPATIENT)
Dept: OTHER | Facility: OTHER | Age: 57
End: 2017-10-05

## 2017-10-09 ENCOUNTER — APPOINTMENT (OUTPATIENT)
Dept: PHYSICAL THERAPY | Facility: CLINIC | Age: 57
End: 2017-10-09
Payer: COMMERCIAL

## 2017-10-16 ENCOUNTER — APPOINTMENT (OUTPATIENT)
Dept: PHYSICAL THERAPY | Facility: CLINIC | Age: 57
End: 2017-10-16
Payer: COMMERCIAL

## 2017-10-25 ENCOUNTER — GENERIC CONVERSION - ENCOUNTER (OUTPATIENT)
Dept: OTHER | Facility: OTHER | Age: 57
End: 2017-10-25

## 2017-10-25 ENCOUNTER — GENERIC CONVERSION - ENCOUNTER (OUTPATIENT)
Dept: FAMILY MEDICINE CLINIC | Facility: HOSPITAL | Age: 57
End: 2017-10-25

## 2017-10-30 ENCOUNTER — ALLSCRIPTS OFFICE VISIT (OUTPATIENT)
Dept: OTHER | Facility: OTHER | Age: 57
End: 2017-10-30

## 2017-10-30 DIAGNOSIS — M79.643 PAIN OF HAND: ICD-10-CM

## 2017-10-31 NOTE — PROGRESS NOTES
Assessment  1  Tenosynovitis of finger (727 05) (M65 9)    Plan  Hand pain    · * XR HAND 3+ VIEW LEFT; Status:Active - Retrospective By Protocol Authorization; Requested for:[id number];   Tenosynovitis of finger    · Follow Up After Surgery Evaluation and Treatment  Follow-up  Status: Hold For -  Scheduling  Requested for: 13HZJ9055   · Continue with our present treatment plan ; Status:Complete;   Done: 66HRN3090 01:47PM   · Please refer to the patient information sheet that was provided at your office visit today for  information on  your current condition ; Status:Complete;   Done: 27MAC5736 01:47PM    Discussion/Summary    Assessment: Left thumb, long, and ring finger trigger finger with ganglion cyst of the left long finger  Paul Borders with ganglion cyst on the left, and ring finger trigger finger on the right    anatomy and physiology of trigger finger was discussed with the patient today in the office  Edema and increased contact pressure within the flexor tendons at the A1 pulley can cause pain, crepitation, and limitation of function  Treatment options include resting MP blocking splints to decrease edema, oral anti-inflammatory medications, home or formal therapy exercises, up to 2 steroid injections or surgical release  While majority of patients do respond to conservative treatment, up to 20% may require surgical release  The patient has elected release of the left thumb, long, and ring finger trigger finger  patient has elected to undergo a release of the A1 pulley (trigger finger)  A small incision will be made over the palmar aspect of the hand, the tendon sheath holding the flexor tendons will be released  In the postoperative period, light activities are allowed immediately, driving is allowed when narcotic medication has stopped, and the incision may get wet after 2 days  Heavy activities (lifting more than approximately 10 pounds) will be allowed after the follow up appointment in 1-2 weeks  While the pain and discomfort within the wrist typically improves rapidly, some residual discomfort may be present for up to 6 weeks  The nodule that is typically palpable in the palmar aspect of the hand will not be removed, as this would necessitate removal of a portion of the flexor tendon, however the catching, clicking, and locking should resolve  Approximate success rate is 98%  risks and benefits of the procedure were explained to the patient, which include, but are not limited to: Bleeding, infection, recurrence, pain, scar, damage to tendons, damage to nerves, and damage to blood vessels, need for future surgery and complications related to anesthesia  If bony work is done, risks also include malunion and nonunion  These risks, along with alternative conservative treatment options, and postoperative protocols were voiced back and understood by the patient  All questions were answered to the patient's satisfaction  The patient agrees to comply with a standard postoperative protocol, and is willing to proceed  Education was provided via written and auditory forms  There were no barriers to learning  Written handouts regarding wound care, incision and scar care, and general preoperative information, as well as risks and benefits were provided to the patient  will observe the de Quervain on the left and the ring finger trigger finger on the right  Will undergo left-sided thumb, long, and ring finger trigger finger releases with excision of the ganglion of the left long finger under local   note was dictated with dictation software  As such, and may contain typographical errors, improperly dictated words, background noise, or other errors  patient presents evaluation bilateral hand pain  She has catching popping and locking with triggering of the right ring, left thumb, long, and ring finger with a ganglion cyst of the left hand long finger and de Quervain of the left room hand   She is complaining of pain catching popping locking but no numbness or tingling  past medical history, medications, allergies, and review of systems have been read and reviewed on the chart and have been updated  of Systems:NegativeNegative except as aboveAs aboveNegative except as aboveNegative    / Psych: Awake and Oriented, No acute distress, age appropriateNormocephalic, atraumatic, mucous membranes moist, neck supple, trachea midline  No rebound or signs of guardingNo discernible arrhythmia, 2+ pulses with good capillary refillNo audible wheezing or audible stridor[The patient is neurovascularly intact in the median, ulnar, and radial nerve distribution  There is normal sensation and good capillary refill within the digits  2+ pulses  ]Mass noted over the 1st dorsal extensor compartment left wrist  Patient with a mass over the left hand long finger A1 pulley  Triggering with nodules with full range of motion to the left thumb, long, and ring finger and right ring finger  Positive Finkelstein test left wrist  Studies: [none]     Chief Complaint  1  Hand Problem    Active Problems  1  Anemia (285 9) (D64 9)   2  Asthma, moderate persistent (493 90) (J45 40)   3  Benign essential hypertension (401 1) (I10)   4  Cervical radiculopathy (723 4) (M54 12)   5  Cervicalgia (723 1) (M54 2)   6  Chronic low back pain (724 2,338 29) (M54 5,G89 29)   7  Depression with anxiety (300 4) (F41 8)   8  Diabetes mellitus type 2, controlled (250 00) (E11 9)   9  Diabetes mellitus with hyperglycemia (250 00) (E11 65)   10  Elevated liver enzymes (790 5) (R74 8)   11  Esophageal reflux (530 81) (K21 9)   12  Flu vaccine need (V04 81) (Z23)   13  Hand pain (729 5) (M79 643)   14  History of dizziness (V13 89) (Z87 898)   15  Hypercholesterolemia (272 0) (E78 00)   16  Iron deficiency anemia (280 9) (D50 9)   17  Lumbar radiculopathy (724 4) (M54 16)   18  History of Neck pain (723 1) (M54 2)   19  Need for influenza vaccination (V04 81) (Z23)   20   Obstructive sleep apnea (327 23) (G47 33)   21  Paresthesia of upper extremity (782 0) (R20 2)   22  Persistent insomnia (307 42) (G47 00)   23  Restless legs syndrome (333 94) (G25 81)   24  Right shoulder pain (719 41) (M25 511)   25  History of Right shoulder pain (719 41) (M25 511)   26  Sexual dysfunction (302 70) (R37)   27  Shortness of breath (786 05) (R06 02)   28  History of Shoulder pain (719 41) (M25 519)   29  Status post bariatric surgery (V45 86) (Z98 84)   30  Status post shoulder hemiarthroplasty (V43 61) (Z96 619)   31  Tenosynovitis of finger (727 05) (M65 9)   32  Tension type headache (339 10) (G44 209)   33  Upper back pain on left side (724 5) (M54 9)   34  Weakness of upper extremity (729 89) (R29 898)    Current Meds   1  Leydi Contour Next Monitor w/Device Kit; TEST ONCE DAILY; Therapy: 87SNP4689 to (Last Rx:80Ufm2967)  Requested for: 04KJW6882 Ordered   2  [employer] Contour Next Test In Vitro Strip; TEST BS DAILY; Therapy: 33WJN2953 to (Last Rx:57Xnr1486)  Requested for: 59WLC4328 Ordered   3  Dulera 200-5 MCG/ACT Inhalation Aerosol; INHALE 1 PUFFS Twice daily; Therapy: 10QVY4205 to (Evaluate:22Jun2018)  Requested for: 25Oct2017; Last   AZ:02KWI2450 Ordered   4  Furosemide 20 MG Oral Tablet; TAKE 1 TAB BY MOUTH DAILY; Therapy: 50QKV0277 to (Evaluate:46Ibb6961)  Requested for: 37Isw1149 Recorded   5  LORazepam 0 5 MG Oral Tablet; TAKE TWO TABLETS BY MOUTH AT BEDTIME AND   ONE TABLET BY MOUTH EVERY 6 HOURS AS NEEDED; Therapy: 53SPS9721 to (Angel Jensen)  Requested for: ; Last   BE:38ETX5169 Ordered   6  Metoprolol Succinate ER 50 MG Oral Tablet Extended Release 24 Hour; TAKE 1 TABLET   DAILY; Therapy: 87ACT1294 to (Evaluate:12Nov2017)  Requested for: 43Lrw9967; Last   Rx:06Dsx8487 Ordered   7  Pantoprazole Sodium 40 MG Oral Tablet Delayed Release (Protonix); TAKE 1 TABLET   TWICE DAILY 30 MINUTES BEFORE BREAKFAST AND DINNER for 2 weeks then   decrease to 1 tablet daily;    Therapy: 46HSI0500 to (Blanca Felipe)  Requested for: 50IXD4423; Last   Rx:03Drg1796 Ordered   8  Potassium Chloride Bonny ER 10 MEQ Oral Tablet Extended Release; TAKE 1 TABLET   TWICE DAILY; Therapy: 82OWO4897 to (Nadira )  Requested for: 12Apr2017; Last   Rx:12Apr2017 Ordered   9  ProAir  (90 Base) MCG/ACT Inhalation Aerosol Solution; INHALE 1 TO 2 PUFFS   EVERY 4 TO 6 HOURS AS NEEDED; Therapy: 14EQB8745 to (Last Rx:29Mar2017)  Requested for: 29Mar2017 Ordered   10  ROPINIRole HCl - 2 MG Oral Tablet; TAKE ONE TABLET BY MOUTH AT BEDTIME; Therapy: 35Cbt7028 to (Evaluate:02Apr2018)  Requested for: 04Oct2017; Last    Rx:04Oct2017 Ordered   11  TraMADol HCl - 50 MG Oral Tablet; take 2 tabs by mouth twice a day; Therapy: 57HJG0498 to (Last Rx:18Oct2017) Ordered   12  TraZODone HCl - 150 MG Oral Tablet; TAKE 1/3 TO 1 TABLET AT BEDTIME FOR SLEEP; Therapy: 39RYX9180 to (Evaluate:02Apr2018)  Requested for: 75FMH7320; Last    Rx:04Oct2017 Ordered    Allergies  1  Iron Dextran SOLN   2  Codeine Derivatives   3  OxyCODONE HCl CAPS   4  ClonazePAM TABS   5  Januvia TABS   6  Jardiance TABS  7   Seasonal    Vitals  Signs   Heart Rate: 71  Systolic: 543  Diastolic: 78  Height: 5 ft 3 in  Weight: 218 lb   BMI Calculated: 38 62  BSA Calculated: 2 01    Future Appointments    Date/Time Provider Specialty Site   01/25/2018 10:00 AM Breonna Olivares MD Family Medicine Nina Beard MD     Surgery Scheduling Form  Hand-Wrist-Forearm-Elbow Surgery Scheduling Form [facility] Lacks:   Location: [facility]   Confirmation Number:   Procedure Date:   Requested Time:   Surgeon: Dr William Lynch   Co-Surgeon:   Bed: Out Patient - No Bed Required   Anesthesia: Local     PROCEDURE DETAILS   Procedure: Trigger Finger Release 19937 / 727 03   Location/Laterality: Left thumb,-- Left Long finger,-- Left Ring finger   Anticipated frozen section:   Procedure Codes:   Pre-op diagnosis:   Diagnosis Code(s):   Case Length: 45 minutes  Equipment: Soft Tissue Set   Equipment Needs:   Implants:   Is the patient able to walk up a flight of stairs, walk up a hill or do heavy housework WITHOUT having chest pain or shortness of breath?      REGISTRATION & FINANCIAL CLEARANCE   FA Initials:   Insurance:   Policy Number: Group Number:     PRE-ADMISSION TESTING/CLINICAL INFORMATION   PAT Location:     CONSULTS NEEDED:   Anesthesia Consult: No Anesthesia consult needed   Medical Consult: No Medical consult needed   Cardiac Consult: No Cardiac consult needed   Other Consult: No Other consult needed      ALLERGIES AND ALERTS   Latex Allergy: NO   Penicillin Allergy: NO   Malignant Hyperthermia: NO   Diabetic Patient: NO     CASE MANAGEMENT:     COMMENTS     Scheduling Information Provided By:      Signatures   Electronically signed by : CARA Escobedo ; Oct 30 2017  1:50PM EST                       (Author)

## 2017-11-21 ENCOUNTER — ALLSCRIPTS OFFICE VISIT (OUTPATIENT)
Dept: OTHER | Facility: OTHER | Age: 57
End: 2017-11-21

## 2017-11-21 ENCOUNTER — TRANSCRIBE ORDERS (OUTPATIENT)
Dept: ADMINISTRATIVE | Facility: HOSPITAL | Age: 57
End: 2017-11-21

## 2017-11-21 DIAGNOSIS — J45.40 MODERATE PERSISTENT ASTHMA, UNSPECIFIED WHETHER COMPLICATED: Primary | ICD-10-CM

## 2017-11-24 ENCOUNTER — TRANSCRIBE ORDERS (OUTPATIENT)
Dept: SLEEP CENTER | Facility: CLINIC | Age: 57
End: 2017-11-24

## 2017-11-24 DIAGNOSIS — G47.33 OSA (OBSTRUCTIVE SLEEP APNEA): Primary | ICD-10-CM

## 2017-12-08 RX ORDER — METOPROLOL SUCCINATE 50 MG/1
25 TABLET, EXTENDED RELEASE ORAL DAILY
COMMUNITY
End: 2018-03-01 | Stop reason: SDUPTHER

## 2017-12-08 RX ORDER — FUROSEMIDE 20 MG/1
20 TABLET ORAL 2 TIMES DAILY
COMMUNITY
End: 2018-08-01 | Stop reason: SDUPTHER

## 2017-12-08 NOTE — PRE-PROCEDURE INSTRUCTIONS
Pre-Surgery Instructions:   Medication Instructions    albuterol (PROAIR HFA) 90 mcg/act inhaler Patient was instructed by Physician and understands   furosemide (LASIX) 20 mg tablet Patient was instructed by Physician and understands   LORazepam (ATIVAN) 0 5 mg tablet Patient was instructed by Physician and understands   metFORMIN (GLUMETZA) 1000 MG (MOD) 24 hr tablet Patient was instructed by Physician and understands   metoprolol succinate (TOPROL-XL) 50 mg 24 hr tablet Patient was instructed by Physician and understands   pantoprazole (PROTONIX) 40 mg tablet Patient was instructed by Physician and understands   potassium chloride (K-DUR,KLOR-CON) 10 mEq tablet Patient was instructed by Physician and understands   rOPINIRole (REQUIP) 2 mg tablet Patient was instructed by Physician and understands   traMADol (ULTRAM) 50 mg tablet Patient was instructed by Physician and understands   traZODone (DESYREL) 150 mg tablet Patient was instructed by Physician and understands  Pre shower with hibiclens as instructed by surgeon  You may drive yourself to the hospital   You may take your medications day of surgery  You may eat on the day of your surgery  You may have a dressing, please wear something that will fit over it

## 2017-12-11 ENCOUNTER — HOSPITAL ENCOUNTER (OUTPATIENT)
Dept: SLEEP CENTER | Facility: HOSPITAL | Age: 57
Discharge: HOME | End: 2017-12-11
Payer: COMMERCIAL

## 2017-12-11 DIAGNOSIS — G47.33 OSA (OBSTRUCTIVE SLEEP APNEA): ICD-10-CM

## 2017-12-11 PROCEDURE — 95811 POLYSOM 6/>YRS CPAP 4/> PARM: CPT

## 2017-12-14 ENCOUNTER — HOSPITAL ENCOUNTER (OUTPATIENT)
Facility: HOSPITAL | Age: 57
Setting detail: OUTPATIENT SURGERY
Discharge: HOME | End: 2017-12-14
Attending: ORTHOPAEDIC SURGERY | Admitting: ORTHOPAEDIC SURGERY
Payer: COMMERCIAL

## 2017-12-14 VITALS
WEIGHT: 225 LBS | BODY MASS INDEX: 39.87 KG/M2 | DIASTOLIC BLOOD PRESSURE: 75 MMHG | HEART RATE: 85 BPM | OXYGEN SATURATION: 96 % | SYSTOLIC BLOOD PRESSURE: 153 MMHG | RESPIRATION RATE: 18 BRPM | HEIGHT: 63 IN | TEMPERATURE: 97.7 F

## 2017-12-14 PROBLEM — M65.332 TRIGGER MIDDLE FINGER OF LEFT HAND: Status: ACTIVE | Noted: 2017-12-14

## 2017-12-14 PROBLEM — M65.312 TRIGGER THUMB, LEFT THUMB: Status: ACTIVE | Noted: 2017-12-14

## 2017-12-14 PROBLEM — M65.342 TRIGGER RING FINGER OF LEFT HAND: Status: ACTIVE | Noted: 2017-12-14

## 2017-12-14 PROBLEM — M67.40 GANGLION CYST OF FLEXOR TENDON SHEATH: Status: ACTIVE | Noted: 2017-12-14

## 2017-12-14 RX ORDER — HYDROCODONE BITARTRATE AND ACETAMINOPHEN 5; 325 MG/1; MG/1
1 TABLET ORAL EVERY 6 HOURS PRN
Qty: 10 TABLET | Refills: 0 | Status: SHIPPED | OUTPATIENT
Start: 2017-12-14 | End: 2018-02-14 | Stop reason: ALTCHOICE

## 2017-12-14 RX ORDER — LIDOCAINE HYDROCHLORIDE AND EPINEPHRINE 10; 10 MG/ML; UG/ML
20 INJECTION, SOLUTION INFILTRATION; PERINEURAL ONCE
Status: COMPLETED | OUTPATIENT
Start: 2017-12-14 | End: 2017-12-14

## 2017-12-14 RX ADMIN — LIDOCAINE HYDROCHLORIDE,EPINEPHRINE BITARTRATE 11 ML: 10; .01 INJECTION, SOLUTION INFILTRATION; PERINEURAL at 12:44

## 2017-12-14 NOTE — H&P
H&P Exam - Orthopedics   Mattie Joy 59 y o  female MRN: [id number]  Unit/Bed#: OR POOL    Assessment/Plan   Assessment:  Trigger finger left long, ring, and thumb with ganglion cyst of the flexor tendon sheath left long  Plan:  Trigger finger release left thumb, long, and ring and ganglion cyst excision left long    History of Present Illness   HPI:  Han Ballard is a 59 y o  y o  female who presents with triggering to the left thumb, long, and ring finger with a ganglion cyst to the left long finger with pain catching and locking  Hannah Mora Historical Information  Review Of Systems:   · Skin: Normal  · Neuro: See HPI  · Musculoskeletal: See HPI  · 14 point review of systems negative except as stated above     Past Medical History:   Past Medical History:   Diagnosis Date    Anemia     Asthma     CHF (congestive heart failure) (HCC)     Diabetes mellitus (HCC)     GERD (gastroesophageal reflux disease)     Restless leg     Sleep apnea     Tachycardia        Past Surgical History:   Past Surgical History:   Procedure Laterality Date    ABDOMINAL SURGERY      CARPAL TUNNEL RELEASE      CHOLECYSTECTOMY      GASTRIC BYPASS      HERNIA REPAIR      HYSTERECTOMY      SHOULDER SURGERY         Family History:  Family history reviewed and non-contributory  Family History   Problem Relation Age of Onset    Alzheimer's disease Mother     Parkinsonism Father     Arthritis Father        Social History:  Social History     Social History    Marital status: Single     Spouse name: N/A    Number of children: N/A    Years of education: N/A     Social History Main Topics    Smoking status: Current Every Day Smoker     Packs/day: 0 25    Smokeless tobacco: Never Used    Alcohol use Yes      Comment: 2/night    Drug use: No    Sexual activity: Not Asked     Other Topics Concern    None     Social History Narrative    None       Allergies:    Allergies   Allergen Reactions    Iron Dextran Swelling    Januvia [Sitagliptin] Shortness Of Breath    Jardiance [Empagliflozin] Shortness Of Breath    Latex Other (See Comments)     redness and sore  Adhesives    Clonazepam Other (See Comments)     Unknown reaction    Codeine Itching    Oxycodone-Acetaminophen Anxiety           Labs:    0  Lab Value Date/Time   HCT 39 6 09/07/2017 1749   HCT 35 5 02/14/2017 1420   HCT 37 4 11/18/2016 1133   HGB 13 0 09/07/2017 1749   HGB 11 1 (L) 02/14/2017 1420   HGB 12 1 11/18/2016 1133   INR 1 00 09/07/2017 1749   WBC 10 76 (H) 09/07/2017 1749   WBC 11 98 (H) 02/14/2017 1420   WBC 8 41 11/18/2016 1133   ESR 2 09/07/2017 1749       Meds:  No current facility-administered medications for this encounter  Blood Culture:   No results found for: BLOODCX    Wound Culture:   No results found for: WOUNDCULT    Ins and Outs:  No intake/output data recorded  Physical Exam  There were no vitals taken for this visit  Gen: Alert and oriented to person, place, time  HEENT: EOMI, eyes clear, moist mucus membranes, hearing intact  Respiratory: Bilateral chest rise  No audible wheezing found  Cardiovascular: Regular Rate and Rhythm  Abdomen: soft nontender/nondistended  Ortho Exam:  Triggering with nodules and locking left thumb, long, and ring finger with a small ganglion cyst to the left long finger over the A1 pulley  Decreased wrist and finger range of motion  Neuro Exam:  The patient is neurovascularly intact in the median, ulnar, and radial nerve distribution  There is normal sensation and good capillary refill within the digits  2+ pulses      Lab Results: Reviewed  Imaging: Reviewed

## 2017-12-14 NOTE — OP NOTE
OPERATIVE REPORT  PATIENT NAME: Raymundo Ko  :  1960  MRN: [id number]  Pt Location: [facility] MA[facility] OR    SURGERY DATE: 17    Surgeon(s) and Role:     * Rachel Bettencourt MD - Primary     * Meron Hirsch MD - Assisting    Pre-Op Diagnosis:  Synovitis and tenosynovitis [M65 9]  Trigger thumb of left hand [M65 312]  Trigger middle finger of left hand [M65 332]  Trigger ring finger of left hand [M65 342]  Ganglion [M67 40]    Post-Op Diagnosis Codes: * Synovitis and tenosynovitis [M65 9]     * Trigger thumb of left hand [M65 312]     * Trigger middle finger of left hand [M65 332]     * Trigger ring finger of left hand [M65 342]     * Ganglion [M67 40]    Procedure(s):  THUMB, LONG, RING, TRIGGER FINGER RELEASE (Left)  LONG FINGER GANGLION CYST EXCISION (Left)    Specimen(s):  * No order type specified *    Estimated Blood Loss:   Minimal      Anesthesia Type:   Local    Operative Indications: The patient has a history of Trigger Finger and Volar ganglion cyst that was recalcitrant to conservative management  The decision was made to bring the patient to the operating room for Trigger Finger Release and Volar ganglion cyst excision  Risks of the procedure were explained which include, but are not limited to bleeding; infection; damage to nerves, arteries,veins, tendons; scar; pain; need for reoperation; failure to give desired result; and risks of anaesthesia  All questions were answered to satisfaction and they were willing to proceed  Operative Findings:  Trigger finger thumb, long, and ring finger and ganglion cyst of the A1 pulley left long finger    Complications:   None    Procedure and Technique:  After the patient, site, and procedure were identified, the patient was brought into the operating room in a supine position  Local anaesthesia was adminstered in the preoperative holding area  A tourniquet was not used    The  left upper extremity was then prepped and drapped in a normal, sterile, orthopedic fashion  After the patient, site, and procedure were once again identified, attention was turned to the left thumb  An incision was made over the flexor tendon sheath at the level of the A1 pulley  Dissection was carried out in-line with the flexor tendon sheath and the radial and ulnar digital artery and nerve were protected  The A1 pulley was identified at the base of the incision  Under direct visualization, the A1 pulley was divided along the midline in its entirety with care taken to avoid injury to the underlying tendon  The tendons were examined to ensure that no further catching, popping, clicking or locking occurred with motion of the finger  The above mentioned procedure was then replicated on the left long finger and ring finger  On the long finger of the left hand, a small ganglion cyst was noted overlying the A1 pulley  This was removed in its entirety with a small piece of the pulley to try to prevent recurrence  The cyst measured 2 mm x 2 mm x 2 mm  At the completion of the procedure, hemostasis was obtained with cautery and direct pressure  The wounds were copiously irrigated with sterile solution  The wounds were closed with Prolene  Sterile dressings were applied, including Xeroform, gauze, tweeners, webril, ACE  Please note, all sponge, needle, and instrument counts were correct prior to closure  Loupe magnification was utilized  The patient tolerated the procedure well       I was present for the entire procedure    Patient Disposition:  APU and hemodynamically stable    SIGNATURE: Leopold Mitts, MD  DATE: 12/14/17  TIME: 2:11 PM

## 2017-12-14 NOTE — DISCHARGE INSTRUCTIONS
Post Operative Instructions    You have had surgery on your arm today, please read and follow the information below:  · Elevate your hand above your elbow during the next 24-48 hours to help with swelling  · Place your hand and arm over your head with motion at your shoulder three times a day  · Do not apply any cream/ointment/oil to your incisions including antibiotics  · Do not soak your hands in standing water (dishwater, tubs, Jacuzzi's, pools, etc ) until given permission (typically 2-3 weeks after injury)    Call the office if you notice any:  · Increased numbness or tingling of your hand or fingers that is not relieved with elevation  · Increasing pain that is not controlled with medication  · Difficulty chewing, breathing, swallowing  · Chest pains or shortness of breath  · Fever over 101 4 degrees  Bandage: Remove bandage after 2 days  Motion: Move fingers into a fist 5 times a day, DO NOT move any splinted fingers  Weight bearing status: Avoid heavy lifting (>5 pounds) with the extremity that was operated on until follow up appointment  Normal activities of daily living are OK  Ice: Ice for 10 minutes every hour as needed for swelling x 24 hours  Sling: No sling necessary  Pain medication: Norco/Hydrocodone 1 tab every 6 hours as needed for pain  Follow-up Appointment: 7-10 days  Please call the office if you have any questions or concerns regarding your post-operative care

## 2017-12-28 ENCOUNTER — GENERIC CONVERSION - ENCOUNTER (OUTPATIENT)
Dept: OTHER | Facility: OTHER | Age: 57
End: 2017-12-28

## 2018-01-04 ENCOUNTER — HOSPITAL ENCOUNTER (OUTPATIENT)
Dept: PULMONOLOGY | Facility: HOSPITAL | Age: 58
Discharge: HOME | End: 2018-01-07
Attending: INTERNAL MEDICINE
Payer: COMMERCIAL

## 2018-01-04 RX ORDER — ALBUTEROL SULFATE 2.5 MG/3ML
2.5 SOLUTION RESPIRATORY (INHALATION) EVERY 6 HOURS PRN
Status: DISCONTINUED | OUTPATIENT
Start: 2018-01-04 | End: 2018-01-07 | Stop reason: HOSPADM

## 2018-01-08 ENCOUNTER — GENERIC CONVERSION - ENCOUNTER (OUTPATIENT)
Dept: OTHER | Facility: OTHER | Age: 58
End: 2018-01-08

## 2018-01-08 DIAGNOSIS — Z47.89 ENCOUNTER FOR OTHER ORTHOPEDIC AFTERCARE (CODE): ICD-10-CM

## 2018-01-09 NOTE — RESULT NOTES
Message    Throat culture is negative for strep  Patient aware         Verified Results  (1) THROAT CULTURE (CULTURE, UPPER RESPIRATORY) 12Oct2016 12:00AM [place] [facility]     Test Name Result Flag Reference   Upper Respiratory Culture Final report     Result 1 Comment     Routine respiratory nicholas  Please indicate source of specimen on all future request forms

## 2018-01-09 NOTE — RESULT NOTES
Verified Results  (1) CBC/PLT/DIFF [id number] 02:20PM [facility] Order Number: [id number]     Test Name Result Flag Reference   WBC COUNT 11 98 Thousand/uL H 4 31-10 16   RBC COUNT 4 94 Million/uL  3 81-5 12   HEMOGLOBIN 11 1 g/dL L 11 5-15 4   HEMATOCRIT 35 5 %  34 8-46  1   MCV 72 fL L 82-98   MCH 22 5 pg L 26 8-34 3   MCHC 31 3 g/dL L 31 4-37 4   RDW 17 1 % H 11 6-15 1   MPV 10 8 fL  8 9-12 7   PLATELET COUNT 838 Thousands/uL  149-390   NEUTROPHILS RELATIVE PERCENT 89 % H 43-75   LYMPHOCYTES RELATIVE PERCENT 6 % L 14-44   MONOCYTES RELATIVE PERCENT 5 %  4-12   EOSINOPHILS RELATIVE PERCENT 0 %  0-6   BASOPHILS RELATIVE PERCENT 0 %  0-1   NEUTROPHILS ABSOLUTE COUNT 10 57 Thousands/?L H 1 85-7 62   LYMPHOCYTES ABSOLUTE COUNT 0 77 Thousands/?L  0 60-4 47   MONOCYTES ABSOLUTE COUNT 0 58 Thousand/?L  0 17-1 22   EOSINOPHILS ABSOLUTE COUNT 0 01 Thousand/?L  0 00-0 61   BASOPHILS ABSOLUTE COUNT 0 05 Thousands/?L  0 00-0 10   - Patient Instructions: This bloodwork is non-fasting  Please drink two glasses of water morning of bloodwork  - Patient Instructions: This bloodwork is non-fasting  Please drink two glasses of water morning of bloodwork  (1) COMPREHENSIVE METABOLIC PANEL 68CQN4292 21:14EB [facility] Order Number: [id number]     Test Name Result Flag Reference   GLUCOSE,RANDM 204 mg/dL H    If the patient is fasting, the ADA then defines impaired fasting glucose as > 100 mg/dL and diabetes as > or equal to 123 mg/dL     SODIUM 134 mmol/L L 136-145   POTASSIUM 3 1 mmol/L L 3 5-5 3   CHLORIDE 93 mmol/L L 100-108   CARBON DIOXIDE 32 mmol/L  21-32   ANION GAP (CALC) 9 mmol/L  4-13   BLOOD UREA NITROGEN 7 mg/dL  5-25   CREATININE 0 73 mg/dL  0 60-1 30   Standardized to IDMS reference method   CALCIUM 8 5 mg/dL  8 3-10 1   BILI, TOTAL 0 40 mg/dL  0 20-1 00   ALK PHOSPHATAS 126 U/L H    ALT (SGPT) 49 U/L  12-78   AST(SGOT) 24 U/L  5-45   ALBUMIN 3 0 g/dL L 3 5-5 0 TOTAL PROTEIN 6 5 g/dL  6 4-8 2   eGFR Non-African American      >60 0 ml/min/1 73sq [facility] Disease Education Program recommendations are as follows:  GFR calculation is accurate only with a steady state creatinine  Chronic Kidney disease less than 60 ml/min/1 73 sq  meters  Kidney failure less than 15 ml/min/1 73 sq  meters       (1) AMYLASE [id number] 02:20PM [facility] Order Number: [id number]     Test Name Result Flag Reference   AMYLASE 12 IU/L L      (1) LIPASE [id number] 02:20PM [facility] Order Number: [id number]     Test Name Result Flag Reference   LIPASE 64 u/L L      (1) SED RATE [id number] 02:20PM [facility] Order Number: [id number]     Test Name Result Flag Reference   SED RATE 6 mm/hour  0-15

## 2018-01-10 NOTE — RESULT NOTES
Verified Results  * CT ABDOMEN PELVIS W CONTRAST [id number] 01:52PM Trena Prader     Test Name Result Flag Reference   CT ABDOMEN PELVIS W CONTRAST (Report)     CT ABDOMEN AND PELVIS WITH IV CONTRAST     INDICATION: Right lower quadrant pain with nausea and fever for several days  COMPARISON: CT chest, abdomen, pelvis 11/18/2016  TECHNIQUE: CT examination of the abdomen and pelvis  Axial, sagittal and coronal reformatted projections were created  This examination, like all CT scans performed in the [facility], was performed utilizing techniques to    minimize radiation dose exposure, including the use of iterative reconstruction and automated exposure control  IV Contrast: iohexol (OMNIPAQUE) 350 MG/ML injection (MULTI-DOSE) 100 mL Note: (SINGLE DOSE/MULTI DOSE) information refers to the container from which the contrast was acquired  Contrast was injected one time intravenously without immediate    complication  Enteric Contrast: Enteric contrast was administered  FINDINGS:     ABDOMEN     LOWER CHEST: No significant abnormalities identified in the lower chest      LIVER/BILIARY TREE: Liver is diffusely decreased in density consistent with fatty change  No CT evidence of suspicious hepatic mass  Normal hepatic contours  No biliary dilatation  GALLBLADDER: Gallbladder is surgically absent  SPLEEN: Unremarkable  PANCREAS: Unremarkable  ADRENAL GLANDS: Unremarkable  KIDNEYS/URETERS: Unremarkable  No hydronephrosis  STOMACH AND BOWEL: Evidence of gastric bypass surgery with no enteric contrast in the excluded stomach  No bowel obstruction  There is colonic diverticulosis without evidence of acute diverticulitis  APPENDIX: A normal appendix was visualized  ABDOMINOPELVIC CAVITY: No ascites or free intraperitoneal air  No lymphadenopathy  VESSELS: Unremarkable for patient's age       PELVIS     REPRODUCTIVE ORGANS: Patient is status post hysterectomy  URINARY BLADDER: Unremarkable  ABDOMINAL WALL/INGUINAL REGIONS: Unremarkable  OSSEOUS STRUCTURES: No acute fracture or destructive osseous lesion  IMPRESSION:       1  Diverticulosis without acute diverticulitis, bowel obstruction, or appendicitis  2  Hepatic steatosis  3  Postoperative changes  Workstation performed: [id number]     Signed by:   Antonio Ireland MD   2/14/17       Discussion/Summary   please call  CT scan unremarkable except fatty liver  no signs of diverticulitis or other acute process  if she is having continued significant abdominal pain she probably should be seen in the ER              Patient notified of results

## 2018-01-11 NOTE — MISCELLANEOUS
Message   Recorded as Task   Date: 05/07/2016 08:54 AM, Created By: Jovan Alfred   Task Name: Medical Complaint Callback   Assigned To: [address] [facility]   Regarding Patient: Izzy Panchal, Status: Active   Comment:    Irma Dobbins - 07 May 2016 8:54 AM     TASK CREATED  Caller: Self; Medical Complaint; [phone] (Mobile Phone); [phone] (Work)  complaining of yeast infection - asking for diflucan to pharm    pcb   Zulema Block - 07 May 2016 9:42 AM     TASK EDITED  Symptoms started thursday night  Has itching, soreness, no odor, no pain with urination, Has been using monistat external cream, so she is not sure if she is having a discharge or if it is just the cream   Changing underware frequently  Uses [facility]  PCB   Osvalod Sosa - 07 May 2016 12:03 PM     TASK REPLIED TO: Previously Assigned To IanOsvaldo  Will send 1 dose of diflucan to pharmacy and she should continue monistat  Will need appt early this week if symptoms persist    Zulema Block - 07 May 2016 12:09 PM     TASK EDITED  pt aware        Active Problems    1  Anemia (285 9) (D64 9)   2  History of Asthma, mild intermittent (493 90) (J45 20)   3  Asthma, moderate persistent (493 90) (J45 40)   4  Benign essential hypertension (401 1) (I10)   5  Cervicalgia (723 1) (M54 2)   6  Chronic low back pain (724 2,338 29) (M54 5,G89 29)   7  Cough (786 2) (R05)   8  Depression with anxiety (300 4) (F41 8)   9  Diabetes mellitus type 2, controlled (250 00) (E11 9)   10  Diabetes mellitus type 2, uncontrolled (250 02) (E11 65)   11  Elevated liver enzymes (790 5) (R74 8)   12  Encounter for screening colonoscopy (V76 51) (Z12 11)   13  Encounter for screening mammogram for malignant neoplasm of breast (V76 12)    (Z12 31)   14  Esophageal reflux (530 81) (K21 9)   15  Fatigue (780 79) (R53 83)   16  Flu vaccine need (V04 81) (Z23)   17  History of Foreign body in right ear (931) (T16 1XXA)   18   Heart rate fast (785 0) (R00 0) 19  History of oral aphthous ulcers (V12 79) (Z87 19)   20  History of upper respiratory infection (V12 09) (Z87 09)   21  Hypercholesterolemia (272 0) (E78 0)   22  Iron deficiency anemia (280 9) (D50 9)   23  Lumbar radiculopathy (724 4) (M54 16)   24  Neck pain (723 1) (M54 2)   25  Need for influenza vaccination (V04 81) (Z23)   26  Obesity (278 00) (E66 9)   27  Obstructive sleep apnea (327 23) (G47 33)   28  Palpitations (785 1) (R00 2)   29  Persistent insomnia (307 42) (G47 00)   30  Plantar fasciitis (728 71) (M72 2)   31  Restless legs syndrome (333 94) (G25 81)   32  Right foot pain (729 5) (M79 671)   33  Right otitis media (382 9) (H66 91)   34  Sexual dysfunction (302 70) (R37)   35  Status post bariatric surgery (V45 86) (Z98 84)   36  Tension type headache (339 10) (G44 209)   37  Thumb tendonitis (727 05) (M77 8)   38  Trigger finger of right thumb (727 03) (M65 311)   39  Trigger middle finger of right hand (727 03) (M65 331)   40  Vaginal candidiasis (112 1) (B37 3)    Current Meds   1  Breo Ellipta 100-25 MCG/INH Inhalation Aerosol Powder Breath Activated; One   inhalation daily; Therapy: 35Tfq5685 to (Last Rx:05Nov2015)  Requested for: 56MFC6909 Ordered   2  Fluconazole 150 MG Oral Tablet; TAKE 1 TABLET 1 TIME ONLY; Therapy: 50BYH3073 to (Reita Julianna)  Requested for: 32SAR3595; Last   Rx:35Ayq2526 Ordered   3  Furosemide 40 MG Oral Tablet; TAKE 1 TABLET TWICE DAILY; Therapy: 39IEA6389 to (Evaluate:91Iny0136)  Requested for: 18Lnk6722; Last   Rx:35Kkn0875 Ordered   4  Levofloxacin 750 MG Oral Tablet; 1 tab po daily x 7 days; Therapy: 03RUT0279 to (Last Rx:06Jan2016)  Requested for: 36LWH6252 Ordered   5  LORazepam 0 5 MG Oral Tablet; 2 tablets HS; Therapy: 85NNB4401 to (Evaluate:04Lml1137)  Requested for: 27Apr2016; Last   Rx:27Apr2016 Ordered   6  MetFORMIN HCl  MG Oral Tablet Extended Release 24 Hour; one tablet bid;    Therapy: 91VJQ4234 to (Evaluate:15Oct2016)  Requested for: 14BSE8699; Last   Rx:18Apr2016 Ordered   7  Metoprolol Succinate  MG Oral Tablet Extended Release 24 Hour; TAKE 1   TABLET DAILY; Therapy: 09FOR1381 to (Evaluate:15Oct2016)  Requested for: 18Apr2016; Last   Rx:18Apr2016 Ordered   8  Pantoprazole Sodium 40 MG Oral Tablet Delayed Release; TAKE 1 TABLET 30   MINUTES BEFORE BREAKFAST DAILY; Therapy: 19RRK6182 to (Evaluate:01Jun2016)  Requested for: 55QQF7863; Last   Rx:72Wta3630 Ordered   9  Potassium Chloride Bonny ER 10 MEQ Oral Tablet Extended Release; TAKE 1 TABLET   TWICE DAILY; Therapy: 59ASG3015 to (Evaluate:15Oct2016)  Requested for: 18Apr2016; Last   Rx:18Apr2016 Ordered   10  ProAir  (90 Base) MCG/ACT Inhalation Aerosol Solution; INHALE 1 TO 2 PUFFS    EVERY 4 TO 6 HOURS AS NEEDED; Therapy: 43SDY9216 to (Last Rx:04Feb2016)  Requested for: 31KFV6123 Ordered   11  ROPINIRole HCl - 2 MG Oral Tablet; TAKE 1 TABLET AT BEDTIME; Therapy: 04Lpi6215 to (Evaluate:22Jun2016)  Requested for: 67Qyq1356; Last    Rx:77Mxq2356 Ordered   12  TraMADol HCl - 50 MG Oral Tablet; take 2 tablets at bedtime; Therapy: 66GWV6858 to (Evaluate:58Nvp6158)  Requested for: 19Apr2016; Last    Rx:18Ekf4714 Ordered   13  TraZODone HCl - 150 MG Oral Tablet; TAKE 1/3 TO 1 TABLET AT BEDTIME FOR SLEEP; Therapy: 88NQF7656 to (Evaluate:64Exo7864)  Requested for: 27Apr2016; Last    Rx:03Oxw2732 Ordered    Allergies    1  Iron Dextran SOLN   2  Codeine Derivatives   3  ClonazePAM TABS    4  Seasonal    Signatures   Electronically signed by :  VICKY Castro; May  7 2016 12:23PM EST                       (Author)

## 2018-01-11 NOTE — RESULT NOTES
Verified Results  * XR SHOULDER 2+ VIEW RIGHT 11Jun2016 11:21AM Rory Alexander Order Number: [id number]     Test Name Result Flag Reference   XR SHOULDER 2+ VW RIGHT (Report)     RIGHT SHOULDER     INDICATION: Right shoulder pain  COMPARISON: None     VIEWS: 3; 3 images     FINDINGS:     There is no acute fracture or dislocation  Mild acromioclavicular degenerative arthritis     No lytic or blastic lesions are seen  Soft tissues are unremarkable  IMPRESSION:   Mild acromioclavicular degenerative arthritis   No acute osseous abnormality  Workstation performed: [id number]     Signed by:   Alden Masters MD   6/11/16     * XR CHEST PA & LATERAL [id number] 11:21AM Rory Alexander Order Number: [id number]     Test Name Result Flag Reference   XR CHEST PA & LATERAL (Report)     CHEST - DUAL ENERGY     INDICATION: Shortness of breath   COMPARISON: 1/20/2016 x-rays     VIEWS: PA (including soft tissue/bone algorithms) and lateral projections; 4 images     FINDINGS:     The lungs are clear  No pleural effusions  The cardiomediastinal silhouette is unremarkable  Bony thorax is unremarkable  Findings are stable       IMPRESSION:     No acute cardiopulmonary disease, stable        Workstation performed: [id number]     Signed by:   Alden Masters MD   6/11/16        Pt aware1      1 Amended By: Dottie Powers; Jun 13 2016 8:51 AM EST    Discussion/Summary   please call  right shoulder showing arthritis in the [facility] joint of the shoulder   please call   CXR is normal

## 2018-01-11 NOTE — RESULT NOTES
Verified Results  (1) CBC/PLT/DIFF 51MJZ3926 11:47AM Laith Olivares     Test Name Result Flag Reference   WBC 9 6 x10E3/uL  3 4-10 8   RBC 4 98 x10E6/uL  3 77-5 28   Hemoglobin 11 5 g/dL  11 1-15 9   Hematocrit 37 4 %  34 0-46  6   MCV 75 fL L 79-97   MCH 23 1 pg L 26 6-33 0   MCHC 30 7 g/dL L 31 5-35 7   RDW 15 3 %  12 3-15 4   Platelets 247 A40K7/NZ  150-379   Neutrophils 68 %     Lymphs 20 %     Monocytes 6 %     Eos 5 %     Basos 1 %     Neutrophils (Absolute) 6 5 x10E3/uL  1 4-7 0   Lymphs (Absolute) 2 0 x10E3/uL  0 7-3 1   Monocytes(Absolute) 0 6 x10E3/uL  0 1-0 9   Eos (Absolute) 0 5 x10E3/uL H 0 0-0 4   Baso (Absolute) 0 1 x10E3/uL  0 0-0 2   Immature Granulocytes 0 %     Immature Grans (Abs) 0 0 x10E3/uL  0 0-0 1     (1) COMPREHENSIVE METABOLIC PANEL 56PTE9231 90:14RY Laith Olivares     Test Name Result Flag Reference   Glucose, Serum 212 mg/dL H 65-99   BUN 7 mg/dL  6-24   Creatinine, Serum 0 59 mg/dL  0 57-1 00   eGFR If NonAfricn Am 102 mL/min/1 73  >59   eGFR If Africn Am 118 mL/min/1 73  >59   BUN/Creatinine Ratio 12  9-23   Sodium, Serum 136 mmol/L  134-144   Potassium, Serum 4 4 mmol/L  3 5-5 2   Chloride, Serum 91 mmol/L L    Carbon Dioxide, Total 25 mmol/L  18-29   Calcium, Serum 9 1 mg/dL  8 7-10 2   Protein, Total, Serum 6 4 g/dL  6 0-8 5   Albumin, Serum 4 1 g/dL  3 5-5 5   Globulin, Total 2 3 g/dL  1 5-4 5   A/G Ratio 1 8  1 1-2 5   Bilirubin, Total 0 4 mg/dL  0 0-1 2   Alkaline Phosphatase, S 132 IU/L H    AST (SGOT) 23 IU/L  0-40   ALT (SGPT) 24 IU/L  0-32     (1) LIPID PANEL, FASTING 18CPF7776 11:47AM Laith Olivares     Test Name Result Flag Reference   Cholesterol, Total 168 mg/dL  100-199   Triglycerides 134 mg/dL  0-149   HDL Cholesterol 73 mg/dL  >39   According to ATP-III Guidelines, HDL-C >59 mg/dL is considered a  negative risk factor for CHD  VLDL Cholesterol Antwan 27 mg/dL  5-40   LDL Cholesterol Calc 68 mg/dL  0-99   T   Chol/HDL Ratio 2 3 ratio units 0 0-4 4   T  Chol/HDL Ratio                                                             Men  Women                                               1/2 Avg  Risk  3 4    3 3                                                   Avg Risk  5 0    4 4                                                2X Avg  Risk  9 6    7 1                                                3X Avg  Risk 23 4   11 0     (1) HEMOGLOBIN A1C 18Bhn1376 11:47AM MarshallLaith     Test Name Result Flag Reference   Hemoglobin A1c 9 0 % H 4 8-5 6   Pre-diabetes: 5 7 - 6 4           Diabetes: >6 4           Glycemic control for adults with diabetes: <7 0     (1) MICROALBUMIN CREATININE RATIO, RANDOM URINE 73Wys5292 11:47AM Marshall, Laith     Test Name Result Flag Reference   Creatinine, Urine 11 3 mg/dL  Not Estab  Microalbumin, Urine <3 0 ug/mL  Not Estab  Microalb/Creat Ratio <26 5 mg/g creat  0 0-30 0        Pt aware & she already has an appt for 6/20/16 sched  1      1 Amended By: Kofi Zelaya; Jul 13 2016 11:38 AM EST    Discussion/Summary   please call    labs show uncontrolled diabetes  also indicative of low iron which may worsen RLS sytmpoms  advised visit to review and discuss her diabetes control  rest of labs are good     advise otc iron supplementation, Fes04 325 mg daily

## 2018-01-11 NOTE — MISCELLANEOUS
Current Meds   1  Albuterol Sulfate 1 25 MG/3ML Inhalation Nebulization Solution; USE 1 UNIT DOSE IN   NEBULIZER EVERY 4 TO 6 HOURS AS NEEDED; Therapy: 61FON5121 to [phone])  Requested for: 71AZM4637; Last   Rx:79Nwy8138 Ordered   2  Dulera 200-5 MCG/ACT Inhalation Aerosol; INHALE 1 PUFFS Twice daily; Therapy: 28EGO7782 to (Evaluate:30Jan2017); Last Rx:70Yjt3519 Ordered   3  Furosemide 40 MG Oral Tablet; TAKE 1 TABLET TWICE DAILY; Therapy: 11DSA9135 to (Evaluate:25Ivu3551)  Requested for: 49Nkr3342; Last   Rx:87Xom1499 Ordered   4  LORazepam 0 5 MG Oral Tablet; TAKE TWO TABLETS BY MOUTH AT BEDTIME; Therapy: 24CIJ0354 to (Evaluate:22Jan2017)  Requested for: 63Sbj0780; Last   Rx:35Beu0948 Ordered   5  Metoprolol Succinate  MG Oral Tablet Extended Release 24 Hour; TAKE 1 TABLET   DAILY; Therapy: 20QPU3775 to (Evaluate:01Apr2017)  Requested for: 92REF8784; Last   Rx:05Vbh1078 Ordered   6  Mucinex DM Maximum Strength  MG Oral Tablet Extended Release 12 Hour;   TAKE 1 TABLET EVERY 12 HOURS DAILY; Therapy: 08OSM8191 to [phone])  Requested for: 35NKD5882; Last   Rx:74Pid2272 Ordered   7  Pantoprazole Sodium 40 MG Oral Tablet Delayed Release; TAKE 1 TABLET TWICE DAILY   30 MINUTES BEFORE BREAKFAST AND DINNER for 2 weeks then decrease to 1 tablet   daily; Therapy: 42CJK0551 to (Hobson Angelucci)  Requested for: 40YME3084; Last   Rx:35Uvj7538 Ordered   8  Potassium Chloride Bonny ER 10 MEQ Oral Tablet Extended Release; TAKE 1 TABLET   TWICE DAILY; Therapy: 41EBC1460 to (Evaluate:24Apr2017)  Requested for: 26Oct2016; Last   Rx:26Oct2016 Ordered   9  ProAir  (90 Base) MCG/ACT Inhalation Aerosol Solution; INHALE 1 TO 2 PUFFS   EVERY 4 TO 6 HOURS AS NEEDED; Therapy: 35VCL4875 to (Last Rx:96Izl4636)  Requested for: 55RND2380 Ordered   10  ROPINIRole HCl - 2 MG Oral Tablet; TAKE ONE TABLET BY MOUTH AT BEDTIME;     Therapy: 08Apr2014 to (Evaluate:24Apr2017)  Requested for: 43NKT7205; Last    Rx:94Ovh4072 Ordered   11  TraMADol HCl - 50 MG Oral Tablet; take 2 tablets at bedtime; Therapy: 43YWX6488 to (Evaluate:49Uoi7360)  Requested for: 56XPQ8499; Last    Rx:95Xnm7508 Ordered   12  TraZODone HCl - 150 MG Oral Tablet; TAKE 1/3 TO 1 TABLET AT BEDTIME FOR SLEEP; Therapy: 14KVG5404 to (Evaluate:04Qbc1085)  Requested for: 26Oct2016; Last    Rx:26Oct2016 Ordered    Allergies    1  Iron Dextran SOLN   2  Codeine Derivatives   3  ClonazePAM TABS    4  Seasonal    Message   Recorded as Task   Date: 12/19/2016 04:19 PM, Created By: Levi aPulino   Task Name: Follow Up   Assigned To: Levi Paulino   Regarding Patient: Kulwinder Shen, Status: Active   Comment: Levi Paulino - 19 Dec 2016 4:19 PM     Angie Mahajan,  I just wanted to make you aware, I saw Carlos Vazquez for an acute visit today re: ongoing SOB and cough that you have also seen her for  I sent her for a CXR to r/o any acute process, which is stable compared to last month  I told her I would forward update to you and ask for your further recommendation/opinion  Thank you,  Nery Kolby - 61 Jan 2017 4:07 PM     TASK REPLIED TO: Previously Assigned To Levi Paulino  We attempted to call her to schedule an appointment for a sick visit to try and help her feel better     Signatures   Electronically signed by :  VICKY Miller; Jan 6 2017  8:26AM EST                       (Author)    Electronically signed by : Donavan Miles MD; Jan 6 2017  8:29AM EST                       (Co-author)

## 2018-01-11 NOTE — RESULT NOTES
Verified Results  * XR CHEST PA & LATERAL [id number] 03:21PM Ari Major Order Number: [id number]     Test Name Result Flag Reference   XR CHEST PA & LATERAL (Report)     CHEST - DUAL ENERGY     INDICATION: Cough with shortness of breath and mid chest pain x2 months  COMPARISON: 11/10/2016, 11/18/2016  VIEWS: PA (including soft tissue/bone algorithms) and lateral projections; 4 images     FINDINGS:     The cardiomediastinal silhouette is unremarkable  No acute infiltrates  Persistent linear opacities in the lingula consistent with atelectasis or scarring  No pleural effusions  Visualized osseous structures appear within normal limits for patient's age  IMPRESSION:     No acute pulmonary disease         Workstation performed: [id number]     Signed by:   Tal Tillman MD   12/19/16

## 2018-01-12 NOTE — RESULT NOTES
Verified Results  * MRI SHOULDER RIGHT WO CONTRAST [id number] 09:48AM Sandeep Sears Order Number: [id number]    Order Number: [id number]     Test Name Result Flag Reference   MRI SHOULDER RIGHT WO CONTRAST (Report)     This is a summary report  The complete report is available in the patient's medical record  If you cannot access the medical record, please contact the sending organization for a detailed fax or copy  MRI RIGHT SHOULDER     INDICATION:    Right shoulder pain after fall     COMPARISON: Right shoulder radiograph 6/11/2016     TECHNIQUE:  The following MR sequences were obtained of the right shoulder: Localizer, axial GRE/PD fat sat, oblique coronal T2 fat sat, oblique sagittal T1/T2 fat sat  Images were acquired on a 1 5 Marielos unit  Gadolinium was not used  FINDINGS:     SUBCUTANEOUS TISSUES: Normal     JOINT EFFUSION: There is a moderate glenohumeral joint effusion  ACROMION PROCESS: Normal      ROTATOR CUFF: Supraspinatus and infraspinatus insertional tendinosis without evidence of full-thickness component rotator cuff tear  There is a tear of the superior fibers of the subscapularis tendon from the insertion (series 8 image 15, series 7 image   14)  SUBACROMIAL/SUBDELTOID BURSA: There is a small subacromial/subdeltoid bursitis  LONG HEAD OF BICEPS TENDON: The long head biceps tendon is not visualized within the bicipital groove and appears to be dislocated medially  GLENOID LABRUM: There is intermediate signal within the superior labrum compatible with chronic tearing  GLENOHUMERAL JOINT: Mild degenerative changes  ACROMIOCLAVICULAR JOINT: Mild degenerative changes  BONE MARROW SIGNAL: There are cystic changes within the humeral head  No acute fracture or contusion  IMPRESSION:   1  The bicipital groove is empty with findings suspicious for medial dislocation of the biceps tendon   There is tearing of the superior bundle of the subscapularis tendons from the insertion on the lesser tuberosity  2  Severe tendinosis of the supraspinatus and infraspinatus tendons without full-thickness tear or tendon retraction  3  Intermediate T2 signal in the superior labrum compatible chronic tear  4  Small subacromial/subdeltoid bursitis  5  Moderate joint effusion  6  Degenerative changes of acromioclavicular joint  Workstation performed: [id number]     Signed by:   Viky Hernández MD   7/18/16       Discussion/Summary    please notify pt that her MRI of the shoulder does show a likely bicep tendon tear and a rotator cuff tear - she needs to make appt with ortho for eval asap  Patient aware

## 2018-01-12 NOTE — RESULT NOTES
Message   pt aware1       1 Amended By: Johnie Yanes; Jan 21 2016 11:49 AM EST    Verified Results  * XR CHEST PA & LATERAL 20Jan2016 02:34PM Laith Olivares     Test Name Result Flag Reference   XR CHEST PA & LATERAL (Report)     CHEST      INDICATION: Pneumonia     COMPARISON: 12/31/2015     VIEWS: Frontal and lateral projections; 2 images     FINDINGS:        Cardiomediastinal silhouette appears unremarkable  The lungs are clear  No pneumothorax or pleural effusion  Visualized osseous structures appear within normal limits for the patient's age  IMPRESSION:     No active pulmonary disease  Signed by:   Amanda Cortés DO   1/20/16       Discussion/Summary   please call     CXR is normal

## 2018-01-13 VITALS
TEMPERATURE: 96.9 F | SYSTOLIC BLOOD PRESSURE: 120 MMHG | BODY MASS INDEX: 40.4 KG/M2 | HEIGHT: 63 IN | DIASTOLIC BLOOD PRESSURE: 82 MMHG | WEIGHT: 228 LBS | HEART RATE: 76 BPM

## 2018-01-13 VITALS
TEMPERATURE: 97.8 F | SYSTOLIC BLOOD PRESSURE: 122 MMHG | HEART RATE: 84 BPM | WEIGHT: 213.8 LBS | HEIGHT: 63 IN | DIASTOLIC BLOOD PRESSURE: 78 MMHG | BODY MASS INDEX: 37.88 KG/M2

## 2018-01-13 NOTE — RESULT NOTES
Verified Results  * XR CHEST PA & LATERAL [id number] 02:16PM Angely Abel Order Number: [id number]     Test Name Result Flag Reference   XR CHEST PA & LATERAL (Report)     CHEST      INDICATION: Bronchitis  Cough  Congestion  Shortness of breath and wheezing  COMPARISON: June 11, 2016  VIEWS: Frontal and lateral projections; 2 images     FINDINGS:        Cardiomediastinal silhouette appears unremarkable  There is subtle reticular increased density is in the lower anterior chest bilaterally with questionable associated tubular bronchiectatic changes  Findings appear similar when compared to most recent prior examination and diagnostic considerations    include subsegmental atelectasis, interstitial lung disease, or the sequela of atypical infectious process such as NANCY  Minimal upper lung zones are clear  Visualized osseous structures appear within normal limits for the patient's age  IMPRESSION:     Subtle reticular increased density is in the lower anterior chest bilaterally with questionable associated tubular bronchiectatic changes  Findings appear similar when compared to most recent prior examination and diagnostic considerations include    subsegmental atelectasis, interstitial lung disease, or the sequela of atypical infectious process such as NANCY  Consider nonemergent follow-up high-resolution chest CT  ##sigslh##sigslh       Workstation performed: [id number]     Signed by:   Davon Alanis MD   11/11/16       Plan  Abnormal chest x-ray    · * CT CHEST WO CONTRAST; Status:Need Information - Financial Authorization; Requested for:11Nov2016;    · CT CHEST W WO CONTRAST; Status:Need Information - Financial Authorization; Requested for:11Nov2016;     Discussion/Summary    please call  chest xray not showing pneuonia  but what they call a reticular density in both sides  numerous conditions that can cause this  a ct scan of the lungs is recommended     will place order   Patient aware

## 2018-01-13 NOTE — PROGRESS NOTES
History of Present Illness  Care Coordination Encounter Information:   Type of Encounter: Telephonic   Contact: Follow-Up    Spoke to Patient   Nathalyej 73 Coordination  Nurse [facility]: I reached out to GOLDIE SKINNERDAHLIA [facility] to discuss her current health and she was available to converse with me  We had a delightful conversation in order to uncover some difficulties with her daily events  She is overwhelmed with the responsibilities for her parents and does not have much time for herself  She will be more accountable for her eating as I will be calling her next week to discuss different food options as well as glucose numbers  We will at that point discuss exercise and further weight reduction  She is doing very well with her weight loss and wants to continue this success  JG       Active Problems    1  Anemia (285 9) (D64 9)   2  Asthma, moderate persistent (493 90) (J45 40)   3  Benign essential hypertension (401 1) (I10)   4  Cervicalgia (723 1) (M54 2)   5  Chronic low back pain (724 2,338 29) (M54 5,G89 29)   6  Depression with anxiety (300 4) (F41 8)   7  Diabetes mellitus type 2, controlled (250 00) (E11 9)   8  Diabetes mellitus with hyperglycemia (250 00) (E11 65)   9  Elevated liver enzymes (790 5) (R74 8)   10  Esophageal reflux (530 81) (K21 9)   11  History of tension headache (V13 89) (Z87 898)   12  Hypercholesterolemia (272 0) (E78 00)   13  Iron deficiency anemia (280 9) (D50 9)   14  Lumbar radiculopathy (724 4) (M54 16)   15  Muscle spasms of neck (728 85) (M62 838)   16  History of Neck pain (723 1) (M54 2)   17  Need for influenza vaccination (V04 81) (Z23)   18  Obstructive sleep apnea (327 23) (G47 33)   19  Persistent insomnia (307 42) (G47 00)   20  Plantar fasciitis (728 71) (M72 2)   21  Restless legs syndrome (333 94) (G25 81)   22  Right shoulder pain (719 41) (M25 511)   23  History of Right shoulder pain (719 41) (M25 511)   24  Sexual dysfunction (302 70) (R37)   25   Shortness of breath (786 05) (R06 02)   26  History of Shoulder pain (719 41) (M25 519)   27  Status post bariatric surgery (V45 86) (Z98 84)   28  Status post shoulder hemiarthroplasty (V43 61) (Z96 619)   29  Tension type headache (339 10) (G44 209)    Past Medical History    1  History of Abdominal pain, periumbilical (626 92) (L65 34)   2  History of Acute gastroenteritis (558 9) (K52 9)   3  History of Acute pain of right shoulder (719 41) (M25 511)   4  History of Acute respiratory infection (519 8) (J22)   5  History of Acute upper respiratory infection (465 9) (J06 9)   6  Asthma, moderate persistent (493 90) (J45 40)   7  Chronic low back pain (724 2,338 29) (M54 5,G89 29)   8  History of Community acquired pneumonia (5) (J18 9)   9  Diabetes mellitus with hyperglycemia (250 00) (E11 65)   10  Elevated liver enzymes (790 5) (R74 8)   11  History of acute bronchitis (V12 69) (Z87 09)   12  History of acute bronchitis (V12 69) (Z87 09)   13  History of acute pharyngitis (V12 69) (Z87 09)   14  History of diarrhea (V12 79) (Z87 898)   15  History of fatigue (V13 89) (Z87 898)   16  History of hypokalemia (V12 29) (Z86 39)   17  History of nausea (V12 79) (Z87 898)   18  History of palpitations (V12 59) (Z87 898)   19  Denied: History of substance abuse   20  History of tension headache (V13 89) (Z87 898)   21  History of upper respiratory infection (V12 09) (Z87 09)   22  Muscle spasms of neck (728 85) (M62 838)   23  History of Neck pain (723 1) (M54 2)   24  Need for influenza vaccination (V04 81) (Z23)   25  History of Palpitations (785 1) (R00 2)   26  Persistent insomnia (307 42) (G47 00)   27  Right shoulder pain (719 41) (M25 511)   28  History of Right shoulder pain (719 41) (M25 511)   29  Sexual dysfunction (302 70) (R37)   30  History of Shoulder pain (719 41) (M25 519)   31  Status post bariatric surgery (V45 86) (Z98 84)   32  Status post shoulder hemiarthroplasty (V43 61) (D63 107)    Surgical History    1  History of Cholecystectomy Laparoscopic   2  History of Gastric Surgery For Morbid Obesity Bypass With Tima-en-Y   3  History of Neuroplasty Decompression Median Nerve At Carpal Tunnel    Family History  Mother    1  Family history of Atrial fibrillation   2  Family history of Dementia   3  Denied: Family history of substance abuse   4  Family history of Heart problem   5  Family history of Living and Healthy   6  Denied: Family history of Mental health problem   7  Family history of Seizures  Father    6  Family history of Atrial fibrillation   9  Denied: Family history of substance abuse   10  Family history of Living and Healthy   6  Denied: Family history of Mental health problem   12  Family history of Parkinsons disease    Social History    · Cigarette smoker (305 1) (F17 210)   · Light tobacco smoker (305 1) (F17 200)   · History of Never A Smoker   · No drug use   · Occasional alcohol use    Current Meds    1  Furosemide 40 MG Oral Tablet; TAKE 1 TABLET TWICE DAILY; Therapy: 49KKM3294 to (Evaluate:01Jun2018)  Requested for: 56QPL6224; Last   Rx:07Jun2017 Ordered   2  Metoprolol Succinate  MG Oral Tablet Extended Release 24 Hour; TAKE 1 TABLET   DAILY; Therapy: 54KLB3564 to (Evaluate:09Sep2017)  Requested for: 31MIL5800; Last   Rx:13Mar2017 Ordered    3  TraMADol HCl - 50 MG Oral Tablet; take 2 tabs by mouth twice a day; Therapy: 78ZBB4768 to (Last Rx:11Xhp4498) Ordered    4  TraZODone HCl - 150 MG Oral Tablet; TAKE 1/3 TO 1 TABLET AT BEDTIME FOR SLEEP; Therapy: 85JMU9325 to ([phone])  Requested for: 02Cbx2959; Last   Rx:35Cvh6657 Ordered    5  Leydi Contour Next Monitor w/Device Kit; TEST ONCE DAILY; Therapy: 42QZM1949 to (Last Rx:14Dve6151)  Requested for: 41LCU4201 Ordered    6  Leydi Contour Next Test In Vitro Strip; TEST BS DAILY; Therapy: 25FAD0952 to (Last Rx:74Ete9244)  Requested for: 86LUH5278 Ordered    7   Pantoprazole Sodium 40 MG Oral Tablet Delayed Release (Protonix); TAKE 1 TABLET   TWICE DAILY 30 MINUTES BEFORE BREAKFAST AND DINNER for 2 weeks then   decrease to 1 tablet daily; Therapy: 72QBC6531 to (Niru Gant)  Requested for: 92QPB2334; Last   Rx:82Znh7419 Ordered    8  Dulera 200-5 MCG/ACT Inhalation Aerosol; INHALE 1 PUFFS Twice daily; Therapy: 88PXV5065 to (Evaluate:24Nov2017)  Requested for: 77ZEF8509; Last   Rx:29Mar2017 Ordered    9  ProAir  (90 Base) MCG/ACT Inhalation Aerosol Solution; INHALE 1 TO 2 PUFFS   EVERY 4 TO 6 HOURS AS NEEDED; Therapy: 81XYN4292 to (Last Rx:29Mar2017)  Requested for: 29Mar2017 Ordered    10  Potassium Chloride Bonny ER 10 MEQ Oral Tablet Extended Release; TAKE 1 TABLET    TWICE DAILY; Therapy: 69YPS4872 to [phone])  Requested for: 12Apr2017; Last    Rx:87Twi8743 Ordered    11  ROPINIRole HCl - 2 MG Oral Tablet; TAKE ONE TABLET BY MOUTH AT BEDTIME; Therapy: 24Net6882 to (Evaluate:08Oct2017)  Requested for: 59Bax0750; Last    Rx:12Apr2017 Ordered    12  Claritin 10 MG Oral Tablet; take 1 tablet daily as needed Recorded    Allergies    1  Iron Dextran SOLN   2  Codeine Derivatives   3  OxyCODONE HCl CAPS   4  ClonazePAM TABS    5  Seasonal    Health Management   COLONOSCOPY; every 10 years; Last 79TLE1437; Next Due: 25SXI1301; Active    End of Encounter Meds    1  Furosemide 40 MG Oral Tablet; TAKE 1 TABLET TWICE DAILY; Therapy: 68BEX7481 to (Evaluate:01Jun2018)  Requested for: 06FUG7194; Last   Rx:07Jun2017 Ordered   2  Metoprolol Succinate  MG Oral Tablet Extended Release 24 Hour; TAKE 1 TABLET   DAILY; Therapy: 74KTY6363 to (Evaluate:07Afh9942)  Requested for: 95LCE8271; Last   Rx:13Mar2017 Ordered    3  TraMADol HCl - 50 MG Oral Tablet; take 2 tabs by mouth twice a day; Therapy: 10TKB1745 to (Last Rx:97Qfn4602) Ordered    4  TraZODone HCl - 150 MG Oral Tablet; TAKE 1/3 TO 1 TABLET AT BEDTIME FOR SLEEP;    Therapy: 02FGY1957 to [phone])  Requested for: 12Apr2017; Last Rx: 61JNA3421 Ordered    5  Leydi Contour Next Monitor w/Device Kit; TEST ONCE DAILY; Therapy: 26EXG0030 to (Last Rx:56Stu0774)  Requested for: 07YMT6719 Ordered    6  Leydi Contour Next Test In Vitro Strip; TEST BS DAILY; Therapy: 89OFU8837 to (Last Rx:79Njw7965)  Requested for: 00RRH1460 Ordered    7  Pantoprazole Sodium 40 MG Oral Tablet Delayed Release (Protonix); TAKE 1 TABLET   TWICE DAILY 30 MINUTES BEFORE BREAKFAST AND DINNER for 2 weeks then   decrease to 1 tablet daily; Therapy: 79VTF5760 to (Josephine Mandujano)  Requested for: 43FBQ4814; Last   Rx:64Rwc9475 Ordered    8  Dulera 200-5 MCG/ACT Inhalation Aerosol; INHALE 1 PUFFS Twice daily; Therapy: 92PQY7176 to (Evaluate:24Nov2017)  Requested for: 82UZM5690; Last   Rx:29Mar2017 Ordered    9  ProAir  (90 Base) MCG/ACT Inhalation Aerosol Solution; INHALE 1 TO 2 PUFFS   EVERY 4 TO 6 HOURS AS NEEDED; Therapy: 73HYL0570 to (Last Rx:29Mar2017)  Requested for: 29Mar2017 Ordered    10  Potassium Chloride Bonny ER 10 MEQ Oral Tablet Extended Release; TAKE 1 TABLET    TWICE DAILY; Therapy: 23EDQ8391 to (456 3065)  Requested for: 12Apr2017; Last    Rx:12Apr2017 Ordered    11  ROPINIRole HCl - 2 MG Oral Tablet; TAKE ONE TABLET BY MOUTH AT BEDTIME; Therapy: 77Awa9792 to (Evaluate:08Oct2017)  Requested for: 76Zxe5326; Last    Rx:12Apr2017 Ordered    12  Claritin 10 MG Oral Tablet; take 1 tablet daily as needed Recorded    Future Appointments    Date/Time Provider Specialty Site   08/07/2017 10:00 AM Mirlande Olivares MD Family [facility] Fontana MD     Patient Care Team    Care Team Member Role Specialty Office Number   Madhav TRISTAN    Orthopedic Surgery [phone]   [facility]  Pulmonary Medicine [phone]   Soila Croft MD  Family Medicine [phone], [facility] [phone]     Signatures   Electronically signed by : Clari Chang RN; Jul 19 2017 12:34PM EST (Author)

## 2018-01-14 VITALS
HEART RATE: 88 BPM | RESPIRATION RATE: 14 BRPM | BODY MASS INDEX: 39.51 KG/M2 | OXYGEN SATURATION: 97 % | HEIGHT: 63 IN | WEIGHT: 223 LBS | DIASTOLIC BLOOD PRESSURE: 78 MMHG | SYSTOLIC BLOOD PRESSURE: 134 MMHG | TEMPERATURE: 98.3 F

## 2018-01-14 VITALS
BODY MASS INDEX: 39.48 KG/M2 | WEIGHT: 222.8 LBS | HEIGHT: 63 IN | DIASTOLIC BLOOD PRESSURE: 84 MMHG | HEART RATE: 88 BPM | SYSTOLIC BLOOD PRESSURE: 138 MMHG | TEMPERATURE: 97.2 F

## 2018-01-14 VITALS
DIASTOLIC BLOOD PRESSURE: 76 MMHG | WEIGHT: 242.2 LBS | HEIGHT: 63 IN | SYSTOLIC BLOOD PRESSURE: 130 MMHG | HEART RATE: 108 BPM | BODY MASS INDEX: 42.91 KG/M2 | TEMPERATURE: 100.8 F

## 2018-01-14 NOTE — MISCELLANEOUS
Message   Recorded as Task   Date: 07/14/2016 12:14 PM, Created By: Karyle Halter   Task Name: Medical Complaint Callback   Assigned To: [address] [place] [address]   Regarding Patient: Vianney Burgess, Status: Active   Comment:    Irma Dobbins - 14 Jul 2016 12:14 PM     TASK CREATED  Caller: Self; Medical Complaint; [phone] (Home); [phone] (Work)  complaining of a yeast infection -     asking for diflucan to be sent to [facility]   Laith Olivares - 14 Jul 2016 1:03 PM     TASK REASSIGNED: Previously Assigned To Laith Olivares  done   Vicki Mcdermott - 14 Jul 2016 1:31 PM     TASK REPLIED TO: Previously Assigned To [address] [place] [address]  Left message on [employer]--med at pharm        Active Problems    1  Acute pain of right shoulder (719 41) (M25 511)   2  Anemia (285 9) (D64 9)   3  History of Asthma, mild intermittent (493 90) (J45 20)   4  Asthma, moderate persistent (493 90) (J45 40)   5  Benign essential hypertension (401 1) (I10)   6  Cervicalgia (723 1) (M54 2)   7  Chronic low back pain (724 2,338 29) (M54 5,G89 29)   8  Cough (786 2) (R05)   9  Depression with anxiety (300 4) (F41 8)   10  Diabetes mellitus type 2, controlled (250 00) (E11 9)   11  Diabetes mellitus type 2, uncontrolled (250 02) (E11 65)   12  Elevated liver enzymes (790 5) (R74 8)   13  Encounter for screening colonoscopy (V76 51) (Z12 11)   14  Encounter for screening mammogram for malignant neoplasm of breast (V76 12)    (Z12 31)   15  Esophageal reflux (530 81) (K21 9)   16  Fatigue (780 79) (R53 83)   17  Fever (780 60) (R50 9)   18  Flu vaccine need (V04 81) (Z23)   19  History of Foreign body in right ear (931) (T16 1XXA)   20  Heart rate fast (785 0) (R00 0)   21  History of oral aphthous ulcers (V12 79) (Z87 19)   22  History of upper respiratory infection (V12 09) (Z87 09)   23  Hypercholesterolemia (272 0) (E78 0)   24  Iron deficiency anemia (280 9) (D50 9)   25   Lumbar radiculopathy (724 4) (M54 16) 26  Neck pain (723 1) (M54 2)   27  Need for influenza vaccination (V04 81) (Z23)   28  Obesity (278 00) (E66 9)   29  Obstructive sleep apnea (327 23) (G47 33)   30  Palpitations (785 1) (R00 2)   31  Persistent insomnia (307 42) (G47 00)   32  Plantar fasciitis (728 71) (M72 2)   33  Restless legs syndrome (333 94) (G25 81)   34  Right foot pain (729 5) (M79 671)   35  Right otitis media (382 9) (H66 91)   36  Sexual dysfunction (302 70) (R37)   37  Shoulder pain (719 41) (M25 519)   38  Status post bariatric surgery (V45 86) (Z98 84)   39  Tension type headache (339 10) (G44 209)   40  Thumb tendonitis (727 05) (M77 8)   41  Trigger finger of right thumb (727 03) (M65 311)   42  Trigger middle finger of right hand (727 03) (M65 331)   43  Urinary symptom or sign (788 99) (R39 9)   44  UTI symptoms (788 99) (R39 9)   45  Vaginal candidiasis (112 1) (B37 3)    Current Meds   1  Breo Ellipta 100-25 MCG/INH Inhalation Aerosol Powder Breath Activated; One   inhalation daily; Therapy: 01Smp3356 to (Last Rx:05Nov2015)  Requested for: 97AUL0697 Ordered   2  Fluconazole 150 MG Oral Tablet; TAKE 1 TABLET ONCE AND REPEAT IN 1 WEEK; Therapy: 43JCY3917 to (Evaluate:75Brb4947)  Requested for: 06NYY1551; Last   Rx:49Hgr5188 Ordered   3  Furosemide 40 MG Oral Tablet; TAKE 1 TABLET TWICE DAILY; Therapy: 63BNW9225 to (Evaluate:94Jnx5400)  Requested for: 88Tru1935; Last   Rx:62Xmt9954 Ordered   4  LORazepam 0 5 MG Oral Tablet; 2 tablets HS; Therapy: 60VNQ5568 to (Evaluate:10Oct2016)  Requested for: 10YXV2130; Last   Rx:37Wlf9555 Ordered   5  MetFORMIN HCl  MG Oral Tablet Extended Release 24 Hour; one tablet bid; Therapy: 38XVV6289 to (Evaluate:15Oct2016)  Requested for: 49Umy0185; Last   Rx:18Apr2016 Ordered   6  Metoprolol Succinate  MG Oral Tablet Extended Release 24 Hour; TAKE 1   TABLET DAILY; Therapy: 54DME5272 to (Evaluate:15Oct2016)  Requested for: 18Apr2016; Last   Rx:18Apr2016 Ordered   7  Pantoprazole Sodium 40 MG Oral Tablet Delayed Release; TAKE 1 TABLET 30   MINUTES BEFORE BREAKFAST DAILY; Therapy: 12GMS8115 to (Evaluate:15Oct2016)  Requested for: 70CEQ5967; Last   Rx:17Jun2016 Ordered   8  Potassium Chloride Bonny ER 10 MEQ Oral Tablet Extended Release; TAKE 1 TABLET   TWICE DAILY; Therapy: 82VCE5621 to (Evaluate:15Oct2016)  Requested for: 43Zxy2473; Last   Rx:18Apr2016 Ordered   9  ProAir  (90 Base) MCG/ACT Inhalation Aerosol Solution; INHALE 1 TO 2 PUFFS   EVERY 4 TO 6 HOURS AS NEEDED; Therapy: 90PNL2967 to (Last Rx:46Ptc9113)  Requested for: 20UCF2541 Ordered   10  ROPINIRole HCl - 2 MG Oral Tablet; TAKE ONE TABLET BY MOUTH AT BEDTIME; Therapy: 08Apr2014 to (Evaluate:11Aug2016)  Requested for: 66UOG4112; Last    Rx:33Qzm7586 Ordered   11  TraMADol HCl - 50 MG Oral Tablet; take 2 tablets at bedtime; Therapy: 32MNX3630 to (Evaluate:14Nov2016)  Requested for: 56FGS3286; Last    Rx:17Jun2016 Ordered   12  TraZODone HCl - 150 MG Oral Tablet; TAKE 1/3 TO 1 TABLET AT BEDTIME FOR SLEEP; Therapy: 22SWB2843 to (Evaluate:51Djo4641)  Requested for: 27Apr2016; Last    Rx:27Apr2016 Ordered    Allergies    1  Iron Dextran SOLN   2  Codeine Derivatives   3  ClonazePAM TABS    4   Seasonal    Signatures   Electronically signed by : Belem Hutchison MD; Jul 14 2016  2:59PM EST                       (Author)

## 2018-01-15 ENCOUNTER — GENERIC CONVERSION - ENCOUNTER (OUTPATIENT)
Dept: OTHER | Facility: OTHER | Age: 58
End: 2018-01-15

## 2018-01-15 VITALS
HEART RATE: 72 BPM | HEIGHT: 63 IN | TEMPERATURE: 97.7 F | SYSTOLIC BLOOD PRESSURE: 138 MMHG | DIASTOLIC BLOOD PRESSURE: 80 MMHG | BODY MASS INDEX: 40.11 KG/M2 | WEIGHT: 226.4 LBS

## 2018-01-15 VITALS
BODY MASS INDEX: 38.62 KG/M2 | WEIGHT: 218 LBS | HEART RATE: 71 BPM | SYSTOLIC BLOOD PRESSURE: 118 MMHG | DIASTOLIC BLOOD PRESSURE: 78 MMHG | HEIGHT: 63 IN

## 2018-01-15 VITALS
DIASTOLIC BLOOD PRESSURE: 82 MMHG | SYSTOLIC BLOOD PRESSURE: 130 MMHG | TEMPERATURE: 97.4 F | BODY MASS INDEX: 38.16 KG/M2 | HEART RATE: 76 BPM | HEIGHT: 63 IN | WEIGHT: 215.4 LBS

## 2018-01-15 NOTE — PROGRESS NOTES
History of Present Illness  Care Coordination Encounter Information:   Type of Encounter: Telephonic   Contact: Follow-Up    Spoke to Patient   Mattie  Care Coordination  Nurse [facility]: I continue to follow Jake Pollard as her needs require this outreach  She continues to struggle with her parents' responsibilities and I gave her the number of the [employer] and she will be calling them next week as she is leaving for an extended weekend with her daughter  She needs additional instruction with her eating and I did help her with better selections and her glucose range is   Her weight is 218 down from her last appt weight of 226  She desires additional contact in 2 weeks  JG      Active Problems    1  Anemia (285 9) (D64 9)   2  Asthma, moderate persistent (493 90) (J45 40)   3  Benign essential hypertension (401 1) (I10)   4  Cervicalgia (723 1) (M54 2)   5  Chronic low back pain (724 2,338 29) (M54 5,G89 29)   6  Depression with anxiety (300 4) (F41 8)   7  Diabetes mellitus type 2, controlled (250 00) (E11 9)   8  Diabetes mellitus with hyperglycemia (250 00) (E11 65)   9  Elevated liver enzymes (790 5) (R74 8)   10  Esophageal reflux (530 81) (K21 9)   11  History of tension headache (V13 89) (Z87 898)   12  Hypercholesterolemia (272 0) (E78 00)   13  Iron deficiency anemia (280 9) (D50 9)   14  Lumbar radiculopathy (724 4) (M54 16)   15  Muscle spasms of neck (728 85) (M62 838)   16  History of Neck pain (723 1) (M54 2)   17  Need for influenza vaccination (V04 81) (Z23)   18  Obstructive sleep apnea (327 23) (G47 33)   19  Persistent insomnia (307 42) (G47 00)   20  Plantar fasciitis (728 71) (M72 2)   21  Restless legs syndrome (333 94) (G25 81)   22  Right shoulder pain (719 41) (M25 511)   23  History of Right shoulder pain (719 41) (M25 511)   24  Sexual dysfunction (302 70) (R37)   25  Shortness of breath (786 05) (R06 02)   26  History of Shoulder pain (719 41) (M25 519)   27   Status post bariatric surgery (V45 86) (Z98 84)   28  Status post shoulder hemiarthroplasty (V43 61) (Z96 619)   29  Tension type headache (339 10) (G44 209)    Past Medical History    1  History of Abdominal pain, periumbilical (273 60) (S93 57)   2  History of Acute gastroenteritis (558 9) (K52 9)   3  History of Acute pain of right shoulder (719 41) (M25 511)   4  History of Acute respiratory infection (519 8) (J22)   5  History of Acute upper respiratory infection (465 9) (J06 9)   6  Asthma, moderate persistent (493 90) (J45 40)   7  Chronic low back pain (724 2,338 29) (M54 5,G89 29)   8  History of Community acquired pneumonia (5) (J18 9)   9  Diabetes mellitus with hyperglycemia (250 00) (E11 65)   10  Elevated liver enzymes (790 5) (R74 8)   11  History of acute bronchitis (V12 69) (Z87 09)   12  History of acute bronchitis (V12 69) (Z87 09)   13  History of acute pharyngitis (V12 69) (Z87 09)   14  History of diarrhea (V12 79) (Z87 898)   15  History of fatigue (V13 89) (Z87 898)   16  History of hypokalemia (V12 29) (Z86 39)   17  History of nausea (V12 79) (Z87 898)   18  History of palpitations (V12 59) (Z87 898)   19  Denied: History of substance abuse   20  History of tension headache (V13 89) (Z87 898)   21  History of upper respiratory infection (V12 09) (Z87 09)   22  Muscle spasms of neck (728 85) (M62 838)   23  History of Neck pain (723 1) (M54 2)   24  Need for influenza vaccination (V04 81) (Z23)   25  History of Palpitations (785 1) (R00 2)   26  Persistent insomnia (307 42) (G47 00)   27  Right shoulder pain (719 41) (M25 511)   28  History of Right shoulder pain (719 41) (M25 511)   29  Sexual dysfunction (302 70) (R37)   30  History of Shoulder pain (719 41) (M25 519)   31  Status post bariatric surgery (V45 86) (Z98 84)   32  Status post shoulder hemiarthroplasty (V43 61) (L08 533)    Surgical History    1  History of Cholecystectomy Laparoscopic   2   History of Gastric Surgery For Morbid Obesity Bypass With Tima-en-Y   3  History of Neuroplasty Decompression Median Nerve At Carpal Tunnel    Family History  Mother    1  Family history of Atrial fibrillation   2  Family history of Dementia   3  Denied: Family history of substance abuse   4  Family history of Heart problem   5  Family history of Living and Healthy   6  Denied: Family history of Mental health problem   7  Family history of Seizures  Father    6  Family history of Atrial fibrillation   9  Denied: Family history of substance abuse   10  Family history of Living and Healthy   6  Denied: Family history of Mental health problem   12  Family history of Parkinsons disease    Social History    · Cigarette smoker (305 1) (F17 210)   · Light tobacco smoker (305 1) (F17 200)   · History of Never A Smoker   · No drug use   · Occasional alcohol use    Current Meds    1  Furosemide 40 MG Oral Tablet; TAKE 1 TABLET TWICE DAILY; Therapy: 54KSY5960 to (Evaluate:01Jun2018)  Requested for: 46EMS2896; Last   Rx:07Jun2017 Ordered   2  Metoprolol Succinate  MG Oral Tablet Extended Release 24 Hour; TAKE 1 TABLET   DAILY; Therapy: 55HNM7419 to (Evaluate:29Kov8956)  Requested for: 31DZL1098; Last   Rx:13Mar2017 Ordered    3  TraMADol HCl - 50 MG Oral Tablet; take 2 tabs by mouth twice a day; Therapy: 59QAF2796 to (Last Rx:65Vdk9012) Ordered    4  LORazepam 0 5 MG Oral Tablet; TAKE TWO TABLETS BY MOUTH AT BEDTIME; Therapy: 41OMU3334 to (Evaluate:21Nzv3444)  Requested for: 79CMK1891; Last   Rx:72Brq6862 Ordered   5  TraZODone HCl - 150 MG Oral Tablet; TAKE 1/3 TO 1 TABLET AT BEDTIME FOR SLEEP; Therapy: 59GBH0662 to ()  Requested for: 43Shk1345; Last   Rx:12Apr2017 Ordered    6  Leydi Contour Next Monitor w/Device Kit; TEST ONCE DAILY; Therapy: 05NOA8962 to (Last Rx:69Iox4367)  Requested for: 69LCX2174 Ordered    7  Leydi Contour Next Test In Vitro Strip; TEST BS DAILY;    Therapy: 72AMI7921 to (Last Rx:19Pvx9276)  Requested for: 06YAK7803 Ordered    8  Pantoprazole Sodium 40 MG Oral Tablet Delayed Release (Protonix); TAKE 1 TABLET   TWICE DAILY 30 MINUTES BEFORE BREAKFAST AND DINNER for 2 weeks then   decrease to 1 tablet daily; Therapy: 08OXK5567 to (Tammie Glatter)  Requested for: 91SOJ9680; Last   Rx:43Sia8346 Ordered    9  Dulera 200-5 MCG/ACT Inhalation Aerosol; INHALE 1 PUFFS Twice daily; Therapy: 47EMM7531 to (Evaluate:24Nov2017)  Requested for: 12JAC0206; Last   Rx:29Mar2017 Ordered    10  ProAir  (90 Base) MCG/ACT Inhalation Aerosol Solution; INHALE 1 TO 2 PUFFS    EVERY 4 TO 6 HOURS AS NEEDED; Therapy: 61CBD9688 to (Last Rx:29Mar2017)  Requested for: 29Mar2017 Ordered    11  Potassium Chloride Bonny ER 10 MEQ Oral Tablet Extended Release; TAKE 1 TABLET    TWICE DAILY; Therapy: 72MDF8269 to (Miachel Gums)  Requested for: 12Apr2017; Last    Rx:12Apr2017 Ordered    12  ROPINIRole HCl - 2 MG Oral Tablet; TAKE ONE TABLET BY MOUTH AT BEDTIME; Therapy: 70Ulv3189 to (Evaluate:08Oct2017)  Requested for: 12Wqk0348; Last    Rx:12Apr2017 Ordered    13  Claritin 10 MG Oral Tablet; take 1 tablet daily as needed Recorded    Allergies    1  Iron Dextran SOLN   2  Codeine Derivatives   3  OxyCODONE HCl CAPS   4  ClonazePAM TABS    5  Seasonal    Health Management   COLONOSCOPY; every 10 years; Last 24GKK4675; Next Due: 85EZC2393; Active    End of Encounter Meds    1  Furosemide 40 MG Oral Tablet; TAKE 1 TABLET TWICE DAILY; Therapy: 46TKJ2342 to (Evaluate:01Jun2018)  Requested for: 78QAP2858; Last   Rx:07Jun2017 Ordered   2  Metoprolol Succinate  MG Oral Tablet Extended Release 24 Hour; TAKE 1 TABLET   DAILY; Therapy: 46RDG7882 to (Evaluate:71Quj2433)  Requested for: 26YSW6390; Last   Rx:13Mar2017 Ordered    3  TraMADol HCl - 50 MG Oral Tablet; take 2 tabs by mouth twice a day; Therapy: 23VSU0351 to (Last Rx:05Vup9390) Ordered    4  LORazepam 0 5 MG Oral Tablet; TAKE TWO TABLETS BY MOUTH AT BEDTIME;    Therapy: 33HMT2962 to (Evaluate:17Uvn9102)  Requested for: 21BHK0507; Last   Rx:45Yht7361 Ordered   5  TraZODone HCl - 150 MG Oral Tablet; TAKE 1/3 TO 1 TABLET AT BEDTIME FOR SLEEP; Therapy: 63UZI3261 to [phone]  Requested for: 12Apr2017; Last   Rx:12Apr2017 Ordered    6  Leydi Contour Next Monitor w/Device Kit; TEST ONCE DAILY; Therapy: 96AAN3673 to (Last Rx:69Anz2295)  Requested for: 93MUK7644 Ordered    7  Leydi Contour Next Test In Vitro Strip; TEST BS DAILY; Therapy: 26XZX6291 to (Last Rx:05Hua8824)  Requested for: 21MAQ1327 Ordered    8  Pantoprazole Sodium 40 MG Oral Tablet Delayed Release (Protonix); TAKE 1 TABLET   TWICE DAILY 30 MINUTES BEFORE BREAKFAST AND DINNER for 2 weeks then   decrease to 1 tablet daily; Therapy: 19YNK6735 to (Charo Counts)  Requested for: 80NIO2581; Last   Rx:70Zpr7724 Ordered    9  Dulera 200-5 MCG/ACT Inhalation Aerosol; INHALE 1 PUFFS Twice daily; Therapy: 85SSL8137 to (Evaluate:24Nov2017)  Requested for: 71LET9563; Last   Rx:29Mar2017 Ordered    10  ProAir  (90 Base) MCG/ACT Inhalation Aerosol Solution; INHALE 1 TO 2 PUFFS    EVERY 4 TO 6 HOURS AS NEEDED; Therapy: 35YEH8695 to (Last Rx:29Mar2017)  Requested for: 29Mar2017 Ordered    11  Potassium Chloride Bonny ER 10 MEQ Oral Tablet Extended Release; TAKE 1 TABLET    TWICE DAILY; Therapy: 45QZU7507 to [phone])  Requested for: 12Apr2017; Last    Rx:12Apr2017 Ordered    12  ROPINIRole HCl - 2 MG Oral Tablet; TAKE ONE TABLET BY MOUTH AT BEDTIME; Therapy: 54Pzk8268 to (Evaluate:08Oct2017)  Requested for: 05Pdb6437; Last    Rx:12Apr2017 Ordered    13  Claritin 10 MG Oral Tablet; take 1 tablet daily as needed Recorded    Future Appointments    Date/Time Provider Specialty Site   08/07/2017 10:00 AM Nepomuceno, Gerhardt Friedlander, MD Family Huey Hardin MD     Patient Care Team    Care Team Member Role Specialty Office Number   Aguila Shad M D    Orthopedic Surgery [phone] Chandan Gutierrez DO  Pulmonary Medicine [phone]   Artur Hardy MD  Family Medicine [phone]   [facility], [address] [place] [address] [phone]     Signatures   Electronically signed by : Ula Hashimoto, RN; Aug  1 2017 11:16AM EST                       (Author)

## 2018-01-15 NOTE — RESULT NOTES
Discussion/Summary   please call     her labs do show uncontrolled diabetes  with A1c of 9 0     advise apt if she does not already have one this month to address this   Verified Results  (1) CBC/PLT/DIFF 90DJI5140 11:05AM Laiht Olivares     Test Name Result Flag Reference   WBC 9 7 x10E3/uL  3 4-10 8   RBC 5 17 x10E6/uL  3 77-5 28   Hemoglobin 12 9 g/dL  11 1-15 9   Hematocrit 39 2 %  34 0-46  6   MCV 76 fL L 79-97   MCH 25 0 pg L 26 6-33 0   MCHC 32 9 g/dL  31 5-35 7   RDW 14 9 %  12 3-15 4   Platelets 008 D08S3/YA  150-379   Neutrophils 68 %     Lymphs 20 %     Monocytes 6 %     Eos 5 %     Basos 1 %     Neutrophils (Absolute) 6 6 x10E3/uL  1 4-7 0   Lymphs (Absolute) 1 9 x10E3/uL  0 7-3 1   Monocytes(Absolute) 0 6 x10E3/uL  0 1-0 9   Eos (Absolute) 0 5 x10E3/uL H 0 0-0 4   Baso (Absolute) 0 1 x10E3/uL  0 0-0 2   Immature Granulocytes 0 %     Immature Grans (Abs) 0 0 x10E3/uL  0 0-0 1     (1) COMPREHENSIVE METABOLIC PANEL 99KND5097 33:99JH Laith Olivares     Test Name Result Flag Reference   Glucose, Serum 165 mg/dL H 65-99   BUN 8 mg/dL  6-24   Creatinine, Serum 0 56 mg/dL L 0 57-1 00   BUN/Creatinine Ratio 14  9-23   Sodium, Serum 140 mmol/L  134-144   Potassium, Serum 4 2 mmol/L  3 5-5 2   Chloride, Serum 95 mmol/L L    Carbon Dioxide, Total 27 mmol/L  18-29   Calcium, Serum 9 5 mg/dL  8 7-10 2   Protein, Total, Serum 6 7 g/dL  6 0-8 5   Albumin, Serum 4 4 g/dL  3 5-5 5   Globulin, Total 2 3 g/dL  1 5-4 5   A/G Ratio 1 9  1 2-2 2   Bilirubin, Total 0 4 mg/dL  0 0-1 2   Alkaline Phosphatase, S 124 IU/L H    AST (SGOT) 13 IU/L  0-40   ALT (SGPT) 35 IU/L H 0-32   eGFR If NonAfricn Am 103 mL/min/1 73  >59   eGFR If Africn Am 119 mL/min/1 73  >59     (1) HEMOGLOBIN A1C 26Jun2017 11:05AM Laith Olivares     Test Name Result Flag Reference   Hemoglobin A1c 9 0 % H 4 8-5 6   Pre-diabetes: 5 7 - 6 4           Diabetes: >6 4           Glycemic control for adults with diabetes: <7 0 Pt aware

## 2018-01-16 NOTE — RESULT NOTES
Discussion/Summary   Please call there is significant disc space narrowing at C5 and C6 and C6-C7  Consistent with osteoarthritis  I do recommend physical therapy as previously discussed  However if not improving will need to proceed with MRI  Will need to consider pain management at that time  Verified Results  * XR SPINE CERVICAL COMPLETE 4 OR 5 VW NON INJURY 51Lyf8696 12:04PM Tico Thomas Order Number: [id number]     Test Name Result Flag Reference   XR SPINE CERVICAL COMPLETE 4 OR 5 VW (Report)     CERVICAL SPINE     INDICATION: Neck and shoulder pain for several months worse on the left side  COMPARISON: None     VIEWS: 5     IMAGES: 5     FINDINGS:     No evidence of fracture or subluxation  Disc space narrowing at C5-C6 and C6-C7 with anterior marginal osteophytosis  Osseous neural foraminal encroachment on the left at C5-C6 and C6-C7  The prevertebral soft tissues are within normal limits  The lung apices are intact  IMPRESSION:     Disc space narrowing at C5-C6 and C6-C7 with anterior marginal osteophytosis  Osseous neural foraminal encroachment on the left at C5-C6 and C6-C7  Workstation performed: [id number]     Signed by:    Graciela Garrido MD   9/18/17

## 2018-01-16 NOTE — MISCELLANEOUS
Message  Per Dr Steven Tom instructions , i called Ms Betty Valadez to set up an appointment  "I don't have insurance right now, it got screwed up somehow but I'm taking my meds like I'm suppose to and I'm working thru this cold " "I also have 2 tests scheduled for January that I cancelled until Feb " I asked if she would like to schedule an appointment in Feb and she replied "i"ll call you "      Active Problems    1  History of Abdominal pain, right upper quadrant (789 01) (R10 11)   2  History of Abrasion of labia (911 0) (S30 814A)   3  Anemia (285 9) (D64 9)   4  Asthma, moderate persistent (493 90) (J45 40)   5  Benign essential hypertension (401 1) (I10)   6  Cervicalgia (723 1) (M54 2)   7  Chronic low back pain (724 2,338 29) (M54 5,G89 29)   8  Cough (786 2) (R05)   9  Depression with anxiety (300 4) (F41 8)   10  Diabetes mellitus type 2, controlled (250 00) (E11 9)   11  Elevated liver enzymes (790 5) (R74 8)   12  Encounter for screening colonoscopy (V76 51) (Z12 11)   13  Encounter for screening mammogram for malignant neoplasm of breast (V76 12)    (Z12 31)   14  Esophageal reflux (530 81) (K21 9)   15  Heart rate fast (785 0) (R00 0)   16  History of oral aphthous ulcers (V12 79) (Z87 19)   17  Hypercholesterolemia (272 0) (E78 00)   18  Iron deficiency anemia (280 9) (D50 9)   19  Lumbar radiculopathy (724 4) (M54 16)   20  History of Neck pain (723 1) (M54 2)   21  Need for influenza vaccination (V04 81) (Z23)   22  Obstructive sleep apnea (327 23) (G47 33)   23  Palpitations (785 1) (R00 2)   24  Persistent insomnia (307 42) (G47 00)   25  Plantar fasciitis (728 71) (M72 2)   26  Restless legs syndrome (333 94) (G25 81)   27  History of Right shoulder pain (719 41) (M25 511)   28  Sexual dysfunction (302 70) (R37)   29  Shortness of breath (786 05) (R06 02)   30  History of Shoulder pain (719 41) (M25 519)   31  Status post bariatric surgery (V45 86) (Z98 84)   32   Tension type headache (339 10) (G44 209)    Current Meds   1  Albuterol Sulfate 1 25 MG/3ML Inhalation Nebulization Solution; USE 1 UNIT DOSE IN   NEBULIZER EVERY 4 TO 6 HOURS AS NEEDED; Therapy: 70PND7333 to (0489 33 97 26)  Requested for: 18LJD5234; Last   Rx:56Egc5981 Ordered   2  Dulera 200-5 MCG/ACT Inhalation Aerosol; INHALE 1 PUFFS Twice daily; Therapy: 28RJZ2083 to (Evaluate:30Jan2017); Last Rx:95Pnz9736 Ordered   3  Furosemide 40 MG Oral Tablet; TAKE 1 TABLET TWICE DAILY; Therapy: 27OGB0079 to (Evaluate:76Zyw2148)  Requested for: 80Jjd1467; Last   Rx:17Mcs0392 Ordered   4  LORazepam 0 5 MG Oral Tablet; TAKE TWO TABLETS BY MOUTH AT BEDTIME; Therapy: 11BAM0967 to (Evaluate:22Jan2017)  Requested for: 47Yrt7176; Last   Rx:28Wys8223 Ordered   5  Metoprolol Succinate  MG Oral Tablet Extended Release 24 Hour; TAKE 1   TABLET DAILY; Therapy: 38UBM1400 to (Evaluate:01Apr2017)  Requested for: 12GBT6067; Last   Rx:57Ajc8082 Ordered   6  Mucinex DM Maximum Strength  MG Oral Tablet Extended Release 12 Hour;   TAKE 1 TABLET EVERY 12 HOURS DAILY; Therapy: 54IXR8980 to (0489 33 97 26)  Requested for: 86EIR1641; Last   Rx:63Kyp7001 Ordered   7  Pantoprazole Sodium 40 MG Oral Tablet Delayed Release; TAKE 1 TABLET TWICE   DAILY 30 MINUTES BEFORE BREAKFAST AND DINNER for 2 weeks then decrease to 1   tablet daily; Therapy: 03IKQ1297 to (Trevin Pedraza)  Requested for: 74OJN4651; Last   Rx:32Xys3717 Ordered   8  Potassium Chloride Bonny ER 10 MEQ Oral Tablet Extended Release; TAKE 1 TABLET   TWICE DAILY; Therapy: 27DBU8410 to (Evaluate:24Apr2017)  Requested for: 26Oct2016; Last   Rx:26Oct2016 Ordered   9  ProAir  (90 Base) MCG/ACT Inhalation Aerosol Solution; INHALE 1 TO 2 PUFFS   EVERY 4 TO 6 HOURS AS NEEDED; Therapy: 51XPO0060 to (Last Rx:64Pbw2392)  Requested for: 92URR9811 Ordered   10  ROPINIRole HCl - 2 MG Oral Tablet; TAKE ONE TABLET BY MOUTH AT BEDTIME;     Therapy: 08Apr2014 to (Evaluate:24Apr2017) Requested for: 70AFH7444; Last    Rx:26Oct2016 Ordered   11  TraMADol HCl - 50 MG Oral Tablet; take 2 tablets at bedtime; Therapy: 07BDM4871 to (Evaluate:29Apr2017)  Requested for: 98QOA9647; Last    Rx:30Nov2016 Ordered   12  TraZODone HCl - 150 MG Oral Tablet; TAKE 1/3 TO 1 TABLET AT BEDTIME FOR SLEEP; Therapy: 67IHZ9782 to (Evaluate:24Apr2017)  Requested for: 26Oct2016; Last    Rx:26Oct2016 Ordered    Allergies    1  Iron Dextran SOLN   2  Codeine Derivatives   3  ClonazePAM TABS    4   Seasonal    Signatures   Electronically signed by : Li Metcalf, ; Jan 9 2017  1:30PM EST                       (Author)

## 2018-01-18 NOTE — RESULT NOTES
Verified Results  Rapid StrepA- POC 63BET8022 07:27PM Diamond Son     Test Name Result Flag Reference   Rapid Strep Negative

## 2018-01-18 NOTE — RESULT NOTES
Verified Results  * XR FOOT 3+ VIEW RIGHT 03RFY6768 10:34AM Garett Panchal Order Number: [id number]     Test Name Result Flag Reference   XR FOOT 3+ VW RIGHT (Report)     RIGHT FOOT     INDICATION: Right foot pain  Stepped on piece of wood, pain calcaneus into the mid right foot     COMPARISON: 4/28/2011     VIEWS: 3; 3 images     FINDINGS:     There is no acute fracture or dislocation  Calcaneal spurring  No lytic or blastic lesions are seen  Soft tissues are unremarkable  IMPRESSION:     No acute osseous abnormality  Workstation performed: [id number]     Signed by:   Michael Valle MD   3/19/16       Discussion/Summary    please call  there is a heel spur seen on the xray  no fracture seen  this fits more of the diagnosis of plantar fasciitis  recommend seeing Dr Renae Hassan Room for further eval before proceeding with further imaging   studies  Patient aware    Melly Dias MA   03/21/2016 9:01 AM1       1 Amended By: Donna Block; Mar 21 2016 9:00 AM EST

## 2018-01-18 NOTE — PROGRESS NOTES
History of Present Illness  Care Coordination Encounter Information:   Type of Encounter: Telephonic   Contact: Initial Contact    Spoke to Patient   Mattie  Care Coordination SL Nurse [facility]:   The reason for call is to discuss outreach for follow up/needed services and coordination of meeting care plan treatment goals  This was an abbreviated outreach in order to discuss CC with this patient  I will reach out again next week as she was about to leave for PT  She is eager to participate and desires changes in order to better her overall health  JG      Active Problems    1  Anemia (285 9) (D64 9)   2  Asthma, moderate persistent (493 90) (J45 40)   3  Benign essential hypertension (401 1) (I10)   4  Cervicalgia (723 1) (M54 2)   5  Chronic low back pain (724 2,338 29) (M54 5,G89 29)   6  Depression with anxiety (300 4) (F41 8)   7  Diabetes mellitus type 2, controlled (250 00) (E11 9)   8  Diabetes mellitus with hyperglycemia (250 00) (E11 65)   9  Elevated liver enzymes (790 5) (R74 8)   10  Esophageal reflux (530 81) (K21 9)   11  History of tension headache (V13 89) (Z87 898)   12  Hypercholesterolemia (272 0) (E78 00)   13  Iron deficiency anemia (280 9) (D50 9)   14  Lumbar radiculopathy (724 4) (M54 16)   15  Muscle spasms of neck (728 85) (M62 838)   16  History of Neck pain (723 1) (M54 2)   17  Need for influenza vaccination (V04 81) (Z23)   18  Obstructive sleep apnea (327 23) (G47 33)   19  Persistent insomnia (307 42) (G47 00)   20  Plantar fasciitis (728 71) (M72 2)   21  Restless legs syndrome (333 94) (G25 81)   22  Right shoulder pain (719 41) (M25 511)   23  History of Right shoulder pain (719 41) (M25 511)   24  Sexual dysfunction (302 70) (R37)   25  Shortness of breath (786 05) (R06 02)   26  History of Shoulder pain (719 41) (M25 519)   27  Status post bariatric surgery (V45 86) (Z98 84)   28  Status post shoulder hemiarthroplasty (V43 61) (Z96 619)   29   Tension type headache (480 10) (Z83 053)    Past Medical History    1  History of Abdominal pain, periumbilical (072 25) (U25 16)   2  History of Acute gastroenteritis (558 9) (K52 9)   3  History of Acute pain of right shoulder (719 41) (M25 511)   4  History of Acute respiratory infection (519 8) (J22)   5  History of Acute upper respiratory infection (465 9) (J06 9)   6  Asthma, moderate persistent (493 90) (J45 40)   7  Chronic low back pain (724 2,338 29) (M54 5,G89 29)   8  History of Community acquired pneumonia (5) (J18 9)   9  Diabetes mellitus with hyperglycemia (250 00) (E11 65)   10  Elevated liver enzymes (790 5) (R74 8)   11  History of acute bronchitis (V12 69) (Z87 09)   12  History of acute bronchitis (V12 69) (Z87 09)   13  History of acute pharyngitis (V12 69) (Z87 09)   14  History of diarrhea (V12 79) (Z87 898)   15  History of fatigue (V13 89) (Z87 898)   16  History of hypokalemia (V12 29) (Z86 39)   17  History of nausea (V12 79) (Z87 898)   18  History of palpitations (V12 59) (Z87 898)   19  Denied: History of substance abuse   20  History of tension headache (V13 89) (Z87 898)   21  History of upper respiratory infection (V12 09) (Z87 09)   22  Muscle spasms of neck (728 85) (M62 838)   23  History of Neck pain (723 1) (M54 2)   24  Need for influenza vaccination (V04 81) (Z23)   25  History of Palpitations (785 1) (R00 2)   26  Persistent insomnia (307 42) (G47 00)   27  Right shoulder pain (719 41) (M25 511)   28  History of Right shoulder pain (719 41) (M25 511)   29  Sexual dysfunction (302 70) (R37)   30  History of Shoulder pain (719 41) (M25 519)   31  Status post bariatric surgery (V45 86) (Z98 84)   32  Status post shoulder hemiarthroplasty (V43 61) (P33 156)    Surgical History    1  History of Cholecystectomy Laparoscopic   2  History of Gastric Surgery For Morbid Obesity Bypass With Tima-en-Y   3  History of Neuroplasty Decompression Median Nerve At Carpal Tunnel    Family History  Mother    1   Family history of Atrial fibrillation   2  Family history of Dementia   3  Denied: Family history of substance abuse   4  Family history of Heart problem   5  Family history of Living and Healthy   6  Denied: Family history of Mental health problem   7  Family history of Seizures  Father    6  Family history of Atrial fibrillation   9  Denied: Family history of substance abuse   10  Family history of Living and Healthy   6  Denied: Family history of Mental health problem   12  Family history of Parkinsons disease    Social History    · Cigarette smoker (305 1) (F17 210)   · Light tobacco smoker (305 1) (F17 200)   · History of Never A Smoker   · No drug use   · Occasional alcohol use    Current Meds    1  Furosemide 40 MG Oral Tablet; TAKE 1 TABLET TWICE DAILY; Therapy: 78HWB3595 to (Evaluate:01Jun2018)  Requested for: 11OBG9704; Last   Rx:07Jun2017 Ordered   2  Metoprolol Succinate  MG Oral Tablet Extended Release 24 Hour; TAKE 1 TABLET   DAILY; Therapy: 97SHU6830 to (Evaluate:09Sep2017)  Requested for: 91UUC3991; Last   Rx:13Mar2017 Ordered    3  TraMADol HCl - 50 MG Oral Tablet; take 2 tabs by mouth twice a day; Therapy: 09ERR5334 to (Last Rx:69Zla8067) Ordered    4  TraZODone HCl - 150 MG Oral Tablet; TAKE 1/3 TO 1 TABLET AT BEDTIME FOR SLEEP; Therapy: 17MMA2730 to [phone]  Requested for: 12Apr2017; Last   Rx:12Apr2017 Ordered    5  Janumet -1000 MG Oral Tablet Extended Release 24 Hour; TAKE 1 TABLET   EVERY MORNING; Therapy: 10HCP9273 to (Last Rx:27Waa8714) Ordered    6  Leydi Contour Next Test In Vitro Strip; TEST BS DAILY; Therapy: 72CAI8694 to (Last Rx:91Inf1174)  Requested for: 58UTK6732 Ordered    7  Pantoprazole Sodium 40 MG Oral Tablet Delayed Release (Protonix); TAKE 1 TABLET   TWICE DAILY 30 MINUTES BEFORE BREAKFAST AND DINNER for 2 weeks then   decrease to 1 tablet daily;    Therapy: 66LXT2269 to (Juan Timmons)  Requested for: 98PII4022; Last   Rx:32Ghh8246 Ordered    8  Dulera 200-5 MCG/ACT Inhalation Aerosol; INHALE 1 PUFFS Twice daily; Therapy: 42NNN1706 to (Evaluate:24Nov2017)  Requested for: 84WHK7595; Last   Rx:29Mar2017 Ordered    9  ProAir  (90 Base) MCG/ACT Inhalation Aerosol Solution; INHALE 1 TO 2 PUFFS   EVERY 4 TO 6 HOURS AS NEEDED; Therapy: 67FDI3412 to (Last Rx:29Mar2017)  Requested for: 29Mar2017 Ordered    10  Potassium Chloride Bonny ER 10 MEQ Oral Tablet Extended Release; TAKE 1 TABLET    TWICE DAILY; Therapy: 46IQG1317 to (Saurav Riveraalgo)  Requested for: 12Apr2017; Last    Rx:12Apr2017 Ordered    11  ROPINIRole HCl - 2 MG Oral Tablet; TAKE ONE TABLET BY MOUTH AT BEDTIME; Therapy: 34Vjc6629 to (Evaluate:08Oct2017)  Requested for: 12Apr2017; Last    Rx:12Apr2017 Ordered    12  Claritin 10 MG Oral Tablet; take 1 tablet daily as needed Recorded    Allergies    1  Iron Dextran SOLN   2  Codeine Derivatives   3  OxyCODONE HCl CAPS   4  ClonazePAM TABS    5  Seasonal    Health Management   COLONOSCOPY; every 10 years; Last 22EYK3632; Next Due: 84DTB1910; Active    End of Encounter Meds    1  Furosemide 40 MG Oral Tablet; TAKE 1 TABLET TWICE DAILY; Therapy: 94OMJ4537 to (Evaluate:01Jun2018)  Requested for: 61MJB4716; Last   Rx:07Jun2017 Ordered   2  Metoprolol Succinate  MG Oral Tablet Extended Release 24 Hour; TAKE 1 TABLET   DAILY; Therapy: 55JHM2974 to (Evaluate:09Sep2017)  Requested for: 16PFJ9433; Last   Rx:13Mar2017 Ordered    3  TraMADol HCl - 50 MG Oral Tablet; take 2 tabs by mouth twice a day; Therapy: 74KXJ6193 to (Last Rx:92Lmi9403) Ordered    4  TraZODone HCl - 150 MG Oral Tablet; TAKE 1/3 TO 1 TABLET AT BEDTIME FOR SLEEP; Therapy: 03MRI1815 to ([phone])  Requested for: 12Apr2017; Last   Rx:12Apr2017 Ordered    5  Janumet -1000 MG Oral Tablet Extended Release 24 Hour; TAKE 1 TABLET   EVERY MORNING; Therapy: 86MOF2491 to (Last Rx:23Bjp1424) Ordered    6   [employer]; TEST BS DAILY; Therapy: 86BDD8735 to (Last Rx:64Gyr7259)  Requested for: 64QET0287 Ordered    7  Pantoprazole Sodium 40 MG Oral Tablet Delayed Release (Protonix); TAKE 1 TABLET   TWICE DAILY 30 MINUTES BEFORE BREAKFAST AND DINNER for 2 weeks then   decrease to 1 tablet daily; Therapy: 67JTI3255 to (Marylu Cardoso)  Requested for: 60DIM0467; Last   Rx:47Ecr6191 Ordered    8  Dulera 200-5 MCG/ACT Inhalation Aerosol; INHALE 1 PUFFS Twice daily; Therapy: 28OQN5131 to (Evaluate:24Nov2017)  Requested for: 34CYE0401; Last   Rx:29Mar2017 Ordered    9  ProAir  (90 Base) MCG/ACT Inhalation Aerosol Solution; INHALE 1 TO 2 PUFFS   EVERY 4 TO 6 HOURS AS NEEDED; Therapy: 82EHZ5280 to (Last Rx:29Mar2017)  Requested for: 29Mar2017 Ordered    10  Potassium Chloride Bonny ER 10 MEQ Oral Tablet Extended Release; TAKE 1 TABLET    TWICE DAILY; Therapy: 50PTA1391 to (Gina Madrigal)  Requested for: 12Apr2017; Last    Rx:12Apr2017 Ordered    11  ROPINIRole HCl - 2 MG Oral Tablet; TAKE ONE TABLET BY MOUTH AT BEDTIME; Therapy: 77Hdi8043 to (Evaluate:08Oct2017)  Requested for: 10Vwc2144; Last    Rx:96Xdt9500 Ordered    12  Claritin 10 MG Oral Tablet; take 1 tablet daily as needed Recorded    Future Appointments    Date/Time Provider Specialty Site   08/07/2017 10:00 AM Blaze Olivares MD [facility] Marino Salter MD     Patient Care Team    Care Team Member Role Specialty Office Number   [place] TRISTAN    Orthopedic Surgery [phone]   Rabia Jon DO  Pulmonary Medicine [phone]   Aleshia Yu MD  Family Medicine [phone], [address] [facility] [phone]     Signatures   Electronically signed by : Henry Pantoja RN; Jul 11 2017 10:42AM EST                       (Author)

## 2018-01-22 VITALS
SYSTOLIC BLOOD PRESSURE: 140 MMHG | TEMPERATURE: 96.8 F | BODY MASS INDEX: 38.38 KG/M2 | WEIGHT: 216.6 LBS | HEART RATE: 76 BPM | HEIGHT: 63 IN | DIASTOLIC BLOOD PRESSURE: 88 MMHG

## 2018-01-22 VITALS
BODY MASS INDEX: 37.46 KG/M2 | TEMPERATURE: 98.1 F | SYSTOLIC BLOOD PRESSURE: 130 MMHG | HEIGHT: 63 IN | HEART RATE: 84 BPM | WEIGHT: 211.4 LBS | DIASTOLIC BLOOD PRESSURE: 80 MMHG

## 2018-01-22 VITALS
TEMPERATURE: 97.9 F | DIASTOLIC BLOOD PRESSURE: 62 MMHG | SYSTOLIC BLOOD PRESSURE: 122 MMHG | HEART RATE: 80 BPM | HEIGHT: 63 IN | BODY MASS INDEX: 38.38 KG/M2 | WEIGHT: 216.6 LBS

## 2018-01-22 VITALS
WEIGHT: 213 LBS | HEART RATE: 80 BPM | BODY MASS INDEX: 37.74 KG/M2 | HEIGHT: 63 IN | SYSTOLIC BLOOD PRESSURE: 122 MMHG | DIASTOLIC BLOOD PRESSURE: 82 MMHG | TEMPERATURE: 98.1 F

## 2018-01-22 VITALS — DIASTOLIC BLOOD PRESSURE: 72 MMHG | SYSTOLIC BLOOD PRESSURE: 118 MMHG

## 2018-01-23 NOTE — MISCELLANEOUS
Message  Patient was called and results of sleep study were discussed  Patient was made aware that her pressure needs to be changed to 12-20  Patient was made aware that script with new pressure was faxed to [facility]  Patient was told to call the office back with any further questions or concerns  Active Problems    1  Asthma, moderate persistent (493 90) (J45 40)   2  Benign essential hypertension (401 1) (I10)   3  Cervical radiculopathy (723 4) (M54 12)   4  Cervicalgia (723 1) (M54 2)   5  Chronic low back pain (724 2,338 29) (M54 5,G89 29)   6  Depression with anxiety (300 4) (F41 8)   7  Diabetes mellitus type 2, controlled (250 00) (E11 9)   8  Diabetes mellitus with hyperglycemia (250 00) (E11 65)   9  Elevated liver enzymes (790 5) (R74 8)   10  Esophageal reflux (530 81) (K21 9)   11  Hand pain (729 5) (M79 643)   12  Hypercholesterolemia (272 0) (E78 00)   13  Iron deficiency anemia (280 9) (D50 9)   14  Lumbar radiculopathy (724 4) (M54 16)   15  Obstructive sleep apnea (327 23) (G47 33)   16  Paresthesia of upper extremity (782 0) (R20 2)   17  Restless legs syndrome (333 94) (G25 81)   18  Right shoulder pain (719 41) (M25 511)   19  Sexual dysfunction (302 70) (R37)   20  Status post bariatric surgery (V45 86) (Z98 84)   21  Tenosynovitis of finger (727 05) (M65 9)   22  Tension type headache (339 10) (G44 209)   23  Upper back pain on left side (724 5) (M54 9)   24  Weakness of upper extremity (729 89) (R29 898)    Current Meds   1  Leydi Contour Next Monitor w/Device Kit; TEST ONCE DAILY; Therapy: 84OGO4401 to (Last Rx:04Tdh9543)  Requested for: 11OBW0606 Ordered   2  Leydi Contour Next Test In Vitro Strip; TEST BS DAILY; Therapy: 64LFT0499 to (Last Rx:48Nmn1511)  Requested for: 11VLR0999 Ordered   3  Dulera 200-5 MCG/ACT Inhalation Aerosol; INHALE 1 PUFFS Twice daily; Therapy: 68DAW3540 to (Evaluate:22Jun2018)  Requested for: 25Oct2017; Last   WQ:53LMJ1635 Ordered   4   Furosemide 20 MG Oral Tablet; TAKE 1 TAB BY MOUTH DAILY; Therapy: 77FLD9953 to (Evaluate:38Voy4757)  Requested for: 75Udh5988 Recorded   5  LORazepam 0 5 MG Oral Tablet; TAKE TWO TABLETS BY MOUTH AT BEDTIME AND   ONE TABLET BY MOUTH EVERY 6 HOURS AS NEEDED; Therapy: 49NJN6223 to (DYPKZGMK:32SAT5414)  Requested for: 16Jic3362; Last   Rx:49Bbs8133 Ordered   6  Metoprolol Succinate ER 50 MG Oral Tablet Extended Release 24 Hour; TAKE 1 TABLET   DAILY; Therapy: 59RWL4323 to (Evaluate:12Nov2017)  Requested for: 63Hcp0210; Last   Rx:14Utu6201 Ordered   7  Pantoprazole Sodium 40 MG Oral Tablet Delayed Release (Protonix); TAKE 1 TABLET   TWICE DAILY 30 MINUTES BEFORE BREAKFAST AND DINNER for 2 weeks then   decrease to 1 tablet daily; Therapy: 13SIW5440 to (Maria G Streeter)  Requested for: 39IIR9637; Last   Rx:73Hiq4144 Ordered   8  Potassium Chloride Bonny ER 10 MEQ Oral Tablet Extended Release; TAKE 1 TABLET   TWICE DAILY; Therapy: 93SOK0504 to ([phone])  Requested for: 80Men7570; Last   Rx:12Apr2017 Ordered   9  Potassium Chloride ER 10 MEQ Oral Tablet Extended Release; take one tablet by   mouth twice daily; Therapy: 43FNB1154 to (Celia Aguirre)  Requested for: 66FZL9901; Last   Rx:03Nov2017 Ordered   10  ProAir  (90 Base) MCG/ACT Inhalation Aerosol Solution; INHALE 1 TO 2 PUFFS    EVERY 4 TO 6 HOURS AS NEEDED; Therapy: 98KHV6161 to (Last Rx:29Mar2017)  Requested for: 29Mar2017 Ordered   11  ROPINIRole HCl - 2 MG Oral Tablet; TAKE ONE TABLET BY MOUTH AT BEDTIME; Therapy: 91Nwu0568 to (Evaluate:02Apr2018)  Requested for: 04Oct2017; Last    Rx:04Oct2017 Ordered   12  TraMADol HCl - 50 MG Oral Tablet; take 2 tabs by mouth twice a day; Therapy: 34TIK9753 to (Last Rx:05Xzv0596) Ordered   13  TraZODone HCl - 150 MG Oral Tablet; TAKE 1/3 TO 1 TABLET AT BEDTIME FOR SLEEP; Therapy: 70HUF3754 to (Evaluate:02Apr2018)  Requested for: 44ICO2732; Last    Rx:04Oct2017 Ordered    Allergies    1   Iron Dextran SOLN   2  Codeine Derivatives   3  OxyCODONE HCl CAPS   4  ClonazePAM TABS   5  Januvia TABS   6  Jardiance TABS    7   Seasonal    Signatures   Electronically signed by : Bertha Morfin, ; Dec 28 2017 10:12AM EST                       (Author)

## 2018-01-24 ENCOUNTER — PATIENT OUTREACH (OUTPATIENT)
Dept: FAMILY MEDICINE CLINIC | Facility: HOSPITAL | Age: 58
End: 2018-01-24

## 2018-01-24 VITALS
HEART RATE: 91 BPM | BODY MASS INDEX: 41.63 KG/M2 | DIASTOLIC BLOOD PRESSURE: 82 MMHG | WEIGHT: 235 LBS | RESPIRATION RATE: 16 BRPM | SYSTOLIC BLOOD PRESSURE: 139 MMHG

## 2018-01-24 VITALS
RESPIRATION RATE: 14 BRPM | HEIGHT: 63 IN | SYSTOLIC BLOOD PRESSURE: 130 MMHG | TEMPERATURE: 97.6 F | DIASTOLIC BLOOD PRESSURE: 70 MMHG | WEIGHT: 233.6 LBS | HEART RATE: 94 BPM | BODY MASS INDEX: 41.39 KG/M2

## 2018-02-05 ENCOUNTER — TELEPHONE (OUTPATIENT)
Dept: PULMONOLOGY | Facility: CLINIC | Age: 58
End: 2018-02-05

## 2018-02-05 NOTE — TELEPHONE ENCOUNTER
I am not refilling this medication because She needs to discuss dosing and wether the medication is still needed with her PCP as they should be treating her GERD

## 2018-02-05 NOTE — TELEPHONE ENCOUNTER
Pt has a question on pantoprazole  She wants to take it bid  She a lneeds a 90 days supply refills to [facility]

## 2018-02-08 ENCOUNTER — HOSPITAL ENCOUNTER (OUTPATIENT)
Dept: PULMONOLOGY | Facility: HOSPITAL | Age: 58
Discharge: HOME | End: 2018-02-08
Attending: INTERNAL MEDICINE
Payer: COMMERCIAL

## 2018-02-08 DIAGNOSIS — J45.40 MODERATE PERSISTENT ASTHMA, UNSPECIFIED WHETHER COMPLICATED: ICD-10-CM

## 2018-02-08 PROCEDURE — 94726 PLETHYSMOGRAPHY LUNG VOLUMES: CPT

## 2018-02-08 PROCEDURE — 94729 DIFFUSING CAPACITY: CPT

## 2018-02-08 PROCEDURE — 94726 PLETHYSMOGRAPHY LUNG VOLUMES: CPT | Performed by: INTERNAL MEDICINE

## 2018-02-08 PROCEDURE — 94060 EVALUATION OF WHEEZING: CPT | Performed by: INTERNAL MEDICINE

## 2018-02-08 PROCEDURE — 94760 N-INVAS EAR/PLS OXIMETRY 1: CPT

## 2018-02-08 PROCEDURE — 94729 DIFFUSING CAPACITY: CPT | Performed by: INTERNAL MEDICINE

## 2018-02-08 PROCEDURE — 94060 EVALUATION OF WHEEZING: CPT

## 2018-02-08 RX ORDER — ALBUTEROL SULFATE 2.5 MG/3ML
2.5 SOLUTION RESPIRATORY (INHALATION) EVERY 4 HOURS PRN
Status: DISCONTINUED | OUTPATIENT
Start: 2018-02-08 | End: 2018-02-12 | Stop reason: HOSPADM

## 2018-02-08 RX ADMIN — ALBUTEROL SULFATE 2.5 MG: 2.5 SOLUTION RESPIRATORY (INHALATION) at 11:58

## 2018-02-13 DIAGNOSIS — Z79.899 ENCOUNTER FOR LONG-TERM (CURRENT) DRUG USE: Primary | ICD-10-CM

## 2018-02-13 RX ORDER — TRAMADOL HYDROCHLORIDE 50 MG/1
2 TABLET ORAL
COMMUNITY
Start: 2017-07-07 | End: 2018-02-14 | Stop reason: ALTCHOICE

## 2018-02-14 RX ORDER — TRAMADOL HYDROCHLORIDE 50 MG/1
50 TABLET ORAL EVERY 6 HOURS PRN
Qty: 120 TABLET | Refills: 0 | Status: SHIPPED | OUTPATIENT
Start: 2018-02-14 | End: 2018-03-06 | Stop reason: SDUPTHER

## 2018-02-14 RX ORDER — PANTOPRAZOLE SODIUM 40 MG/1
40 TABLET, DELAYED RELEASE ORAL 2 TIMES DAILY
Qty: 60 TABLET | Refills: 4 | Status: SHIPPED | OUTPATIENT
Start: 2018-02-14 | End: 2018-05-15 | Stop reason: SDUPTHER

## 2018-02-16 RX ORDER — FLUCONAZOLE 150 MG/1
TABLET ORAL
COMMUNITY
Start: 2017-03-20 | End: 2018-11-30 | Stop reason: ALTCHOICE

## 2018-02-16 RX ORDER — NYSTATIN 100000 [USP'U]/G
1 POWDER TOPICAL 2 TIMES DAILY PRN
COMMUNITY
Start: 2018-01-15 | End: 2019-05-10

## 2018-02-16 RX ORDER — OMEPRAZOLE 40 MG/1
40 CAPSULE, DELAYED RELEASE ORAL DAILY
COMMUNITY
End: 2019-05-10 | Stop reason: SDUPTHER

## 2018-02-16 RX ORDER — LORAZEPAM 0.5 MG/1
0.5 TABLET ORAL EVERY 6 HOURS
COMMUNITY
End: 2018-02-20 | Stop reason: SDUPTHER

## 2018-02-16 RX ORDER — DIPHENOXYLATE HYDROCHLORIDE AND ATROPINE SULFATE 2.5; .025 MG/1; MG/1
1 TABLET ORAL
COMMUNITY
End: 2018-11-13 | Stop reason: ALTCHOICE

## 2018-02-16 RX ORDER — ALBUTEROL SULFATE 90 UG/1
1 AEROSOL, METERED RESPIRATORY (INHALATION) EVERY 6 HOURS
COMMUNITY
End: 2018-02-20 | Stop reason: SDUPTHER

## 2018-02-16 RX ORDER — FUROSEMIDE 40 MG/1
40 TABLET ORAL
COMMUNITY
End: 2018-02-20 | Stop reason: SDUPTHER

## 2018-02-20 ENCOUNTER — TELEPHONE (OUTPATIENT)
Dept: FAMILY MEDICINE CLINIC | Facility: HOSPITAL | Age: 58
End: 2018-02-20

## 2018-02-20 ENCOUNTER — OFFICE VISIT (OUTPATIENT)
Dept: PULMONOLOGY | Facility: HOSPITAL | Age: 58
End: 2018-02-20
Payer: COMMERCIAL

## 2018-02-20 VITALS
WEIGHT: 239 LBS | HEART RATE: 83 BPM | SYSTOLIC BLOOD PRESSURE: 180 MMHG | RESPIRATION RATE: 18 BRPM | BODY MASS INDEX: 42.35 KG/M2 | TEMPERATURE: 98 F | DIASTOLIC BLOOD PRESSURE: 100 MMHG | HEIGHT: 63 IN | OXYGEN SATURATION: 96 %

## 2018-02-20 DIAGNOSIS — G47.33 OBSTRUCTIVE SLEEP APNEA: ICD-10-CM

## 2018-02-20 DIAGNOSIS — R05.8 POST-VIRAL COUGH SYNDROME: ICD-10-CM

## 2018-02-20 DIAGNOSIS — Z72.0 TOBACCO USE: ICD-10-CM

## 2018-02-20 DIAGNOSIS — Z79.899 ENCOUNTER FOR LONG-TERM (CURRENT) DRUG USE: Primary | ICD-10-CM

## 2018-02-20 DIAGNOSIS — J44.9 CHRONIC OBSTRUCTIVE ASTHMA (HCC): Primary | ICD-10-CM

## 2018-02-20 PROBLEM — E11.65 DIABETES MELLITUS WITH HYPERGLYCEMIA (HCC): Status: ACTIVE | Noted: 2017-06-23

## 2018-02-20 PROBLEM — M54.12 CERVICAL RADICULOPATHY: Status: ACTIVE | Noted: 2017-09-13

## 2018-02-20 PROBLEM — J44.89 CHRONIC OBSTRUCTIVE ASTHMA: Status: ACTIVE | Noted: 2018-02-20

## 2018-02-20 PROCEDURE — 99214 OFFICE O/P EST MOD 30 MIN: CPT | Performed by: INTERNAL MEDICINE

## 2018-02-20 NOTE — ASSESSMENT & PLAN NOTE
Overall the patient is doing well however I believe that she could have continued improvement in her baseline respiratory symptoms  At this point she is not using Dulera on a regular basis and I encouraged her to use it twice daily every day regardless of how she feels  She will rinse her mouth out after use  She can switch her ProAir to using it on an as needed basis  She is up-to-date on her immunizations  I encouraged her to increase her activity level  Obviously the thing that would help her breathing best is for her to stop smoking

## 2018-02-20 NOTE — ASSESSMENT & PLAN NOTE
I encouraged the patient to stop smoking  Unfortunately she uses it as a stress relief after taking care of her elderly parents all day

## 2018-02-20 NOTE — ASSESSMENT & PLAN NOTE
The patient is doing great from a complaint standpoint  She is at 95% usage with average nights of 5 hours  She states that she has been slowly increasing and recently has been up to 8 hours a night  Her AHI on that settings of auto PAP is now 1 4  She has felt a significant improvement since starting the machine in her daytime fatigue and quality of sleep  I encouraged her to continue using it on a nightly basis

## 2018-02-20 NOTE — PROGRESS NOTES
Assessment:    Tobacco use  I encouraged the patient to stop smoking  Unfortunately she uses it as a stress relief after taking care of her elderly parents all day    [facility] ([facility] )  Overall the patient is doing well however I believe that she could have continued improvement in her baseline respiratory symptoms  At this point she is not using Dulera on a regular basis and I encouraged her to use it twice daily every day regardless of how she feels  She will rinse her mouth out after use  She can switch her ProAir to using it on an as needed basis  She is up-to-date on her immunizations  I encouraged her to increase her activity level  Obviously the thing that would help her breathing best is for her to stop smoking  Obstructive sleep apnea  The patient is doing great from a complaint standpoint  She is at 95% usage with average nights of 5 hours  She states that she has been slowly increasing and recently has been up to 8 hours a night  Her AHI on that settings of auto PAP is now 1 4  She has felt a significant improvement since starting the machine in her daytime fatigue and quality of sleep  I encouraged her to continue using it on a nightly basis  Post-viral cough syndrome  Recommend using Mucinex on an as-needed basis      Plan:    Diagnoses and all orders for this visit:    Chronic obstructive asthma ([facility] 75 )  -     Mometasone Furo-Formoterol Fum (DULERA) 200-5 MCG/ACT AERO; Inhale 2 puffs 2 (two) times a day    Obstructive sleep apnea    Tobacco use    Post-viral cough syndrome  Comments:  Recommend using Mucinex on an as-needed basis    Other orders  -     ascorbic acid (VITAMIN C) 1000 MG tablet; Take 1,000 mg by mouth  -     Blood Glucose Monitoring Suppl (NABILA CONTOUR NEXT MONITOR) w/Device KIT; by Does not apply route daily  -     Discontinue: glucose blood (NABILA CONTOUR TEST) test strip; by In Vitro route  -     Cholecalciferol 1000 units tablet;  Take 1,000 Units by mouth  -     Discontinue: Mometasone Furo-Formoterol Fum (DULERA) 200-5 MCG/ACT AERO; Inhale 1 puff 2 (two) times a day  -     fluconazole (DIFLUCAN) 150 mg tablet; Take one tablet and if symptoms still persist in 3 days take a second tablet  -     Discontinue: Mometasone Furo-Formoterol Fum 100-5 MCG/ACT AERO; Inhale 2 puffs  -     multivitamin (THERAGRAN) TABS; Take 1 tablet by mouth  -     nystatin (MYCOSTATIN) powder; Apply topically  -     omeprazole (PriLOSEC) 40 MG capsule; Take 40 mg by mouth  -     Vitamin E 1000 units CREA; Take 1,000 Units by mouth  -     Discontinue: albuterol (PROVENTIL HFA,VENTOLIN HFA) 90 mcg/act inhaler; Inhale 1 puff every 6 (six) hours  -     Discontinue: furosemide (LASIX) 40 mg tablet; Take 40 mg by mouth  -     Discontinue: LORazepam (ATIVAN) 0 5 mg tablet; Take 0 5 mg by mouth every 6 (six) hours        Follow-up: 4 months      HPI:  The patient reports since our last visit that she had a viral upper respiratory tract infection  She continues to have a cough that is productive of thick white mucus worse in the morning  She will have some wheezing prior to using her rescue inhaler  She feels that she needs to pull string the mucus out of her airway in the morning  If she continues her ProAir rescue inhaler in helps  She continues to have some dyspnea on exertion as well as daily wheezing mostly in the morning  Unfortunately she is using her ProAir scheduled twice a day and as needed but is only using her Dulera as needed  She states she is not even used it in the past couple of days  She continues to smoke cigarettes 3-4 daily  She uses it as a stress relief from taking care of her elderly sick parents  Since our last visit she got an auto PAP with settings of 12-20  She states that she loves her new machine and feels that it is working much better for her  She was unable to use it while she had her upper respiratory tract infection because of the mucus  She has average using it 5-8 hours a night  She has less daytime fatigue and feels that she gets better sleep at night  Review of Systems   Constitutional: Positive for unexpected weight change  Respiratory: Positive for cough and wheezing  Cardiovascular: Negative for chest pain and leg swelling  Musculoskeletal: Positive for arthralgias  Psychiatric/Behavioral:        Stress with taking care of elderly/sick parents       The following portions of the patient's history were reviewed and updated as appropriate: allergies, current medications, past family history, past medical history, past social history, past surgical history and problem list     VITALS:  Vitals:    02/20/18 0959   BP: (!) 180/100   BP Location: Left arm   Patient Position: Sitting   Cuff Size: Adult   Pulse: 83   Resp: 18   Temp: 98 °F (36 7 °C)   TempSrc: Tympanic   SpO2: 96%   Weight: 108 kg (239 lb)   Height: 5' 3" (1 6 m)       Physical Exam  General:  Patient is awake, alert, non-toxic and in no acute respiratory distress  Neck: No JVD  CV:  Regular, +S1 and S2, No murmurs, gallops or rubs appreciated  Lungs: Decreased BS bilateral without wheeze, rales or rhonci  Abdomen: Soft, +BS, Non-tender, non-distended  Extremities: No clubbing, cyanosis or edema  Neuro: No focal deficits        Diagnostic Testing:    PFTs:  Feb 2018: Severe obstruction, bronchodilator responsiveness    Results:  Patient gave good effort and cooperation  The testing met ATS Standards for Acceptability and Repeatability  Baseline oxygen saturation was 97% on room air with a heart rate of 79      Spirometry:  FEV1/FVC Ratio is 59%  FEV1 is 35% predicted  FVC is 46% predicted  There is significant improvement with bronchodilators     Lung volumes: Total lung capacity is 105% predicted    Residual volume is 139% predicted      Diffusing capacity:  85% predicted     Flow volume loop:  Consistent with obstruction    CBC:  Lab Results   Component Value Date WBC 8 9 09/22/2017    HGB 13 0 09/22/2017    HCT 39 7 09/22/2017    MCV 75 (L) 09/22/2017     09/22/2017         BMP:   Lab Results   Component Value Date     09/22/2017    K 4 3 09/22/2017    CL 95 (L) 09/22/2017    CO2 25 09/22/2017    ANIONGAP 8 09/07/2017    BUN 11 09/22/2017    CREATININE 0 54 (L) 09/22/2017    GLUCOSE 131 (H) 09/22/2017    CALCIUM 9 6 09/22/2017    AST 71 (H) 09/22/2017     (H) 09/22/2017    ALKPHOS 199 (H) 09/22/2017    PROT 6 6 09/22/2017    BILITOT 0 4 09/22/2017    EGFR 73 09/07/2017       Sleep Study: Dec 2017 - Needs AutoPap 12-20  Compliance report 12/21/17-2/18/18:  95% compliant, Averge use 5 hours 9 minutes, AHI 1 4    Cary Galeano, DO

## 2018-02-21 DIAGNOSIS — Z79.899 ENCOUNTER FOR LONG-TERM (CURRENT) DRUG USE: ICD-10-CM

## 2018-02-21 RX ORDER — LORAZEPAM 0.5 MG/1
TABLET ORAL
Qty: 90 TABLET | Refills: 0
Start: 2018-02-21 | End: 2018-03-19 | Stop reason: SDUPTHER

## 2018-02-21 RX ORDER — LORAZEPAM 0.5 MG/1
0.5 TABLET ORAL AS NEEDED
Qty: 30 TABLET | Refills: 0 | Status: SHIPPED | OUTPATIENT
Start: 2018-02-21 | End: 2018-02-21 | Stop reason: SDUPTHER

## 2018-02-21 RX ORDER — LORAZEPAM 0.5 MG/1
TABLET ORAL
Qty: 90 TABLET | Refills: 0 | Status: CANCELLED
Start: 2018-02-21

## 2018-02-21 NOTE — TELEPHONE ENCOUNTER
[facility] called to clarify directions on lorazepam that was faxed in   Directions they received stating take 1 as needed  Allscripts had directions as 2 at bedtime and 1 every 6 hours as needed  I changed the directions to what allscripts had  I entered the change on her med but it needs your approval to save    Please sign off on Lorazepam

## 2018-03-01 DIAGNOSIS — I10 ESSENTIAL HYPERTENSION: Primary | ICD-10-CM

## 2018-03-01 RX ORDER — FLUTICASONE FUROATE AND VILANTEROL 100; 25 UG/1; UG/1
1 POWDER RESPIRATORY (INHALATION) DAILY
Qty: 1 EACH | Refills: 11 | Status: SHIPPED | OUTPATIENT
Start: 2018-03-01 | End: 2018-11-13 | Stop reason: ALTCHOICE

## 2018-03-01 RX ORDER — METOPROLOL SUCCINATE 50 MG/1
25 TABLET, EXTENDED RELEASE ORAL DAILY
Qty: 30 TABLET | Refills: 0 | Status: SHIPPED | OUTPATIENT
Start: 2018-03-01 | End: 2018-03-06 | Stop reason: SDUPTHER

## 2018-03-06 DIAGNOSIS — I10 ESSENTIAL HYPERTENSION: ICD-10-CM

## 2018-03-06 DIAGNOSIS — Z79.899 ENCOUNTER FOR LONG-TERM (CURRENT) DRUG USE: ICD-10-CM

## 2018-03-06 NOTE — TELEPHONE ENCOUNTER
PLEASE RENEW TO [facility] QTMAGDA    METOTOPROL 50MG QTY 90 1TAB QAM    TRAMADOL 50MG  2 TABS Am, 2 TABS PM

## 2018-03-08 RX ORDER — TRAMADOL HYDROCHLORIDE 50 MG/1
100 TABLET ORAL 2 TIMES DAILY
Qty: 120 TABLET | Refills: 0 | Status: SHIPPED | OUTPATIENT
Start: 2018-03-08 | End: 2018-03-19 | Stop reason: SDUPTHER

## 2018-03-08 RX ORDER — METOPROLOL SUCCINATE 50 MG/1
50 TABLET, EXTENDED RELEASE ORAL DAILY
Qty: 90 TABLET | Refills: 3 | Status: SHIPPED | OUTPATIENT
Start: 2018-03-08 | End: 2019-03-05 | Stop reason: SDUPTHER

## 2018-03-15 LAB
LEFT EYE DIABETIC RETINOPATHY: NORMAL
RIGHT EYE DIABETIC RETINOPATHY: NORMAL

## 2018-03-15 PROCEDURE — 3072F LOW RISK FOR RETINOPATHY: CPT | Performed by: FAMILY MEDICINE

## 2018-03-19 DIAGNOSIS — Z79.899 ENCOUNTER FOR LONG-TERM (CURRENT) DRUG USE: ICD-10-CM

## 2018-03-19 NOTE — TELEPHONE ENCOUNTER
ASAP  Tramadol &Lorazepam needs to be refilled asap, pt only has one pill of lorazepam left and 3days left of tramadol, please send both to [address] [place]

## 2018-03-19 NOTE — TELEPHONE ENCOUNTER
Sent to Dr Mason Prior to approve  If she calls back, please advise he will fill these on Wednesday when he returns - he needs to approve since they are controlled substances

## 2018-03-22 DIAGNOSIS — Z79.899 ENCOUNTER FOR LONG-TERM (CURRENT) DRUG USE: ICD-10-CM

## 2018-03-22 RX ORDER — LORAZEPAM 0.5 MG/1
TABLET ORAL
Qty: 90 TABLET | Refills: 0 | Status: SHIPPED | OUTPATIENT
Start: 2018-03-22 | End: 2018-04-19 | Stop reason: SDUPTHER

## 2018-03-22 RX ORDER — LORAZEPAM 0.5 MG/1
TABLET ORAL
Qty: 90 TABLET | Refills: 0 | OUTPATIENT
Start: 2018-03-22 | End: 2018-03-22 | Stop reason: SDUPTHER

## 2018-03-22 RX ORDER — TRAMADOL HYDROCHLORIDE 50 MG/1
100 TABLET ORAL 2 TIMES DAILY
Qty: 120 TABLET | Refills: 0 | OUTPATIENT
Start: 2018-03-22 | End: 2018-04-18 | Stop reason: SDUPTHER

## 2018-03-22 RX ORDER — LORAZEPAM 0.5 MG/1
TABLET ORAL
Qty: 90 TABLET | Refills: 0 | OUTPATIENT
Start: 2018-03-22

## 2018-03-23 ENCOUNTER — OFFICE VISIT (OUTPATIENT)
Dept: FAMILY MEDICINE CLINIC | Facility: HOSPITAL | Age: 58
End: 2018-03-23
Payer: COMMERCIAL

## 2018-03-23 VITALS
HEIGHT: 63 IN | HEART RATE: 96 BPM | TEMPERATURE: 98.4 F | SYSTOLIC BLOOD PRESSURE: 142 MMHG | DIASTOLIC BLOOD PRESSURE: 80 MMHG | RESPIRATION RATE: 16 BRPM | BODY MASS INDEX: 42.49 KG/M2 | WEIGHT: 239.8 LBS

## 2018-03-23 DIAGNOSIS — M62.838 MUSCLE SPASMS OF NECK: ICD-10-CM

## 2018-03-23 DIAGNOSIS — G44.209 TENSION HEADACHE: ICD-10-CM

## 2018-03-23 DIAGNOSIS — J44.9 CHRONIC OBSTRUCTIVE ASTHMA (HCC): Primary | ICD-10-CM

## 2018-03-23 DIAGNOSIS — J20.9 ACUTE BRONCHITIS, UNSPECIFIED ORGANISM: ICD-10-CM

## 2018-03-23 PROBLEM — R05.8 POST-VIRAL COUGH SYNDROME: Status: RESOLVED | Noted: 2018-02-20 | Resolved: 2018-03-23

## 2018-03-23 PROCEDURE — 3008F BODY MASS INDEX DOCD: CPT | Performed by: FAMILY MEDICINE

## 2018-03-23 PROCEDURE — 99214 OFFICE O/P EST MOD 30 MIN: CPT | Performed by: FAMILY MEDICINE

## 2018-03-23 RX ORDER — PREDNISONE 20 MG/1
TABLET ORAL
Qty: 10 TABLET | Refills: 0 | Status: SHIPPED | OUTPATIENT
Start: 2018-03-23 | End: 2018-11-13 | Stop reason: ALTCHOICE

## 2018-03-23 RX ORDER — AZITHROMYCIN 250 MG/1
TABLET, FILM COATED ORAL
Qty: 6 TABLET | Refills: 0 | Status: SHIPPED | OUTPATIENT
Start: 2018-03-23 | End: 2018-03-27

## 2018-03-23 RX ORDER — BACLOFEN 10 MG/1
10 TABLET ORAL 3 TIMES DAILY
Qty: 30 TABLET | Refills: 0 | Status: SHIPPED | OUTPATIENT
Start: 2018-03-23 | End: 2018-11-13 | Stop reason: ALTCHOICE

## 2018-03-23 NOTE — PATIENT INSTRUCTIONS
Tension Headache   WHAT YOU NEED TO KNOW:   Tension headaches are most often caused by stress, eye strain, or muscle tightness  The pain of a tension headache may start in the forehead or the back of the head  The pain often spreads over the whole head and down into the neck and shoulders  Over-the-counter pain medicine is the most useful and common treatment for a tension headache  Exercise, biofeedback, meditation, or relaxation techniques may also decrease your headache pain  DISCHARGE INSTRUCTIONS:   Return to the emergency department if:   · You have a sudden headache that seems different or much worse than your usual headaches  · You have difficulty seeing, speaking, or moving  · You pass out, become confused, or have a seizure  · You have a headache, fever, and a stiff neck  Contact your healthcare provider if:   · Your headaches continue to get worse  · Your headaches happen so often that they affect your ability to do your work or normal activities  · You need to take medicine to help your headaches more often than your healthcare provider says you should  · Your headaches get so bad that they cause you to vomit  · You have questions or concerns about your condition or care  Medicines:   · NSAIDs , such as ibuprofen, help decrease swelling, pain, and fever  This medicine is available with or without a doctor's order  NSAIDs can cause stomach bleeding or kidney problems in certain people  If you take blood thinner medicine, always ask your healthcare provider if NSAIDs are safe for you  Always read the medicine label and follow directions  · Acetaminophen  decreases pain and fever  It is available without a doctor's order  Ask how much to take and how often to take it  Follow directions  Read the labels of all other medicines you are using to see if they also contain acetaminophen, or ask your doctor or pharmacist  Acetaminophen can cause liver damage if not taken correctly   Do not use more than 4 grams (4,000 milligrams) total of acetaminophen in one day  · Take your medicine as directed  Contact your healthcare provider if you think your medicine is not helping or if you have side effects  Tell him of her if you are allergic to any medicine  Keep a list of the medicines, vitamins, and herbs you take  Include the amounts, and when and why you take them  Bring the list or the pill bottles to follow-up visits  Carry your medicine list with you in case of an emergency  Manage your symptoms:   · Keep a headache record  Include when the headaches start and stop and what made them better  Describe your symptoms, such as how the pain feels, where it is, and how bad it is  Record anything you ate or drank for the past 24 hours before your headache  Bring this to follow-up visits  · Apply heat as directed  Heat may help decrease headache pain and muscle spasms  Apply heat on the area for 20 to 30 minutes every 2 hours for as many days as directed  A warm bath may also help relieve muscle tension and spasms  · Apply ice as directed  Ice may help decrease headache pain  Use an ice pack or put crushed ice in a plastic bag  Cover it with a towel and place it on the area for 15 to 20 minutes every hour as directed  Prevent tension headaches:   · Avoid muscle tension  Do not stay in one position for long periods of time  Use a different pillow if you wake up with sore neck and shoulder muscles  Find ways to relax your muscles, such as massage or resting in a quiet, dark room  · Avoid eye strain  Make sure you have good lighting when you read, sew, or do similar activities  Get yearly eye exams and wear glasses as directed  · Get enough sleep  Get 8 to 10 hours of sleep each night  Create a sleep schedule  Go to bed and wake up at the same times each day  It may be helpful to do something relaxing before bed  Do not watch television right before bed      · Eat a variety of healthy foods  Healthy foods include fruits, vegetables, whole-grain breads, low-fat dairy products, beans, lean meats, and fish  Do not eat foods that trigger your headaches  · Exercise regularly  Exercise helps decrease stress and headaches  Ask about the best exercise plan for you  · Drink liquids as directed  You may need to drink more liquid to prevent dehydration  Dehydration can make a tension headache worse  Ask your healthcare provider how much liquid to drink and which liquids are best for you  Limit caffeine as directed  Caffeine may make a tension headache worse  · Do not drink alcohol  Alcohol can trigger a headache  It can also prevent medicines from stopping your headache  · Do not smoke  Nicotine and other chemicals in cigarettes and cigars can trigger a headache and also cause lung damage  Ask your healthcare provider for information if you currently smoke and need help to quit  E-cigarettes or smokeless tobacco still contain nicotine  Talk to your healthcare provider before you use these products  Follow up with your healthcare provider as directed:  Bring the headache record with you  Write down your questions so you remember to ask them during your visits  © 2017 [facility] Information is for End User's use only and may not be sold, redistributed or otherwise used for commercial purposes  All illustrations and images included in CareNotes® are the copyrighted property of [employer] D [employer] M [employer]  or [employer]  The above information is an  only  It is not intended as medical advice for individual conditions or treatments  Talk to your doctor, nurse or pharmacist before following any medical regimen to see if it is safe and effective for you  Acute Bronchitis   WHAT YOU NEED TO KNOW:   Acute bronchitis is swelling and irritation in the air passages of your lungs  This irritation may cause you to cough or have other breathing problems   Acute bronchitis often starts because of another illness, such as a cold or the flu  The illness spreads from your nose and throat to your windpipe and airways  Bronchitis is often called a chest cold  Acute bronchitis lasts about 3 to 6 weeks and is usually not a serious illness  Your cough can last for several weeks  DISCHARGE INSTRUCTIONS:   Return to the emergency department if:   · You cough up blood  · Your lips or fingernails turn blue  · You feel like you are not getting enough air when you breathe  Contact your healthcare provider if:   · You have a fever  · Your breathing problems do not go away or get worse  · Your cough does not get better within 4 weeks  · You have questions or concerns about your condition or care  Self-care:   · Get more rest   Rest helps your body to heal  Slowly start to do more each day  Rest when you feel it is needed  · Avoid irritants in the air  Avoid chemicals, fumes, and dust  Wear a face mask if you must work around dust or fumes  Stay inside on days when air pollution levels are high  If you have allergies, stay inside when pollen counts are high  Do not use aerosol products, such as spray-on deodorant, bug spray, and hair spray  · Do not smoke or be around others who smoke  Nicotine and other chemicals in cigarettes and cigars damages the cilia that move mucus out of your lungs  Ask your healthcare provider for information if you currently smoke and need help to quit  E-cigarettes or smokeless tobacco still contain nicotine  Talk to your healthcare provider before you use these products  · Drink liquids as directed  Liquids help keep your air passages moist and help you cough up mucus  You may need to drink more liquids when you have acute bronchitis  Ask how much liquid to drink each day and which liquids are best for you  · Use a humidifier or vaporizer  Use a cool mist humidifier or a vaporizer to increase air moisture in your home   This may make it easier for you to breathe and help decrease your cough  Decrease risk for acute bronchitis:   · Get the vaccinations you need  Ask your healthcare provider if you should get vaccinated against the flu or pneumonia  · Prevent the spread of germs  You can decrease your risk of acute bronchitis and other illnesses by doing the following:     [facility] your hands often with soap and water  Carry germ-killing hand lotion or gel with you  You can use the lotion or gel to clean your hands when soap and water are not available  ¨ Do not touch your eyes, nose, or mouth unless you have washed your hands first     ¨ Always cover your mouth when you cough to prevent the spread of germs  It is best to cough into a tissue or your shirt sleeve instead of into your hand  Ask those around you cover their mouths when they cough  ¨ Try to avoid people who have a cold or the flu  If you are sick, stay away from others as much as possible  Medicines: Your healthcare provider may  give you any of the following:  · Ibuprofen or acetaminophen  are medicines that help lower your fever  They are available without a doctor's order  Ask your healthcare provider which medicine is right for you  Ask how much to take and how often to take it  Follow directions  These medicines can cause stomach bleeding if not taken correctly  Ibuprofen can cause kidney damage  Do not take ibuprofen if you have kidney disease, an ulcer, or allergies to aspirin  Acetaminophen can cause liver damage  Do not take more than 4,000 milligrams in 24 hours  · Decongestants  help loosen mucus in your lungs and make it easier to cough up  This can help you breathe easier  · Cough suppressants  decrease your urge to cough  If your cough produces mucus, do not take a cough suppressant unless your healthcare provider tells you to   Your healthcare provider may suggest that you take a cough suppressant at night so you can rest     · Inhalers  may be given  Your healthcare provider may give you one or more inhalers to help you breathe easier and cough less  An inhaler gives your medicine to open your airways  Ask your healthcare provider to show you how to use your inhaler correctly  · Take your medicine as directed  Contact your healthcare provider if you think your medicine is not helping or if you have side effects  Tell him of her if you are allergic to any medicine  Keep a list of the medicines, vitamins, and herbs you take  Include the amounts, and when and why you take them  Bring the list or the pill bottles to follow-up visits  Carry your medicine list with you in case of an emergency  Follow up with your healthcare provider as directed:  Write down questions you have so you will remember to ask them during your follow-up visits  © 2017 [address] [employer] Information is for End User's use only and may not be sold, redistributed or otherwise used for commercial purposes  All illustrations and images included in CareNotes® are the copyrighted property of A D A M , Inc  or Yunior Harris  The above information is an  only  It is not intended as medical advice for individual conditions or treatments  Talk to your doctor, nurse or pharmacist before following any medical regimen to see if it is safe and effective for you

## 2018-03-30 DIAGNOSIS — G25.81 RESTLESS LEGS SYNDROME (RLS): ICD-10-CM

## 2018-03-30 DIAGNOSIS — G47.00 INSOMNIA, UNSPECIFIED TYPE: ICD-10-CM

## 2018-03-30 DIAGNOSIS — J44.9 CHRONIC OBSTRUCTIVE PULMONARY DISEASE, UNSPECIFIED COPD TYPE (HCC): Primary | ICD-10-CM

## 2018-03-30 RX ORDER — TRAZODONE HYDROCHLORIDE 150 MG/1
150 TABLET ORAL
Qty: 30 TABLET | Refills: 5 | Status: SHIPPED | OUTPATIENT
Start: 2018-03-30 | End: 2018-10-04 | Stop reason: SDUPTHER

## 2018-03-30 RX ORDER — METFORMIN HYDROCHLORIDE 1000 MG/1
1000 TABLET, FILM COATED, EXTENDED RELEASE ORAL 2 TIMES DAILY WITH MEALS
Qty: 60 TABLET | Refills: 5 | Status: SHIPPED | OUTPATIENT
Start: 2018-03-30 | End: 2018-10-04 | Stop reason: SDUPTHER

## 2018-03-30 RX ORDER — ROPINIROLE 2 MG/1
2 TABLET, FILM COATED ORAL
Qty: 30 TABLET | Refills: 5 | Status: SHIPPED | OUTPATIENT
Start: 2018-03-30 | End: 2018-10-03 | Stop reason: SDUPTHER

## 2018-03-30 NOTE — TELEPHONE ENCOUNTER
Pt was seen by Zaki, he told her to call by fri if not better, she's not better so she would like prednison azithromycin baclofen refilled again to   [facility]       She also needs these renewed: Ropinirole  Trazodone  Metformin

## 2018-04-05 ENCOUNTER — TELEPHONE (OUTPATIENT)
Dept: FAMILY MEDICINE CLINIC | Facility: HOSPITAL | Age: 58
End: 2018-04-05

## 2018-04-05 DIAGNOSIS — J20.9 ACUTE BRONCHITIS, UNSPECIFIED ORGANISM: Primary | ICD-10-CM

## 2018-04-05 RX ORDER — LEVOFLOXACIN 750 MG/1
750 TABLET ORAL EVERY 24 HOURS
Qty: 7 TABLET | Refills: 0 | Status: SHIPPED | OUTPATIENT
Start: 2018-04-05 | End: 2018-04-12

## 2018-04-18 DIAGNOSIS — Z79.899 ENCOUNTER FOR LONG-TERM (CURRENT) DRUG USE: ICD-10-CM

## 2018-04-18 DIAGNOSIS — J45.909 UNCOMPLICATED ASTHMA, UNSPECIFIED ASTHMA SEVERITY, UNSPECIFIED WHETHER PERSISTENT: Primary | ICD-10-CM

## 2018-04-18 DIAGNOSIS — G89.29 OTHER CHRONIC PAIN: ICD-10-CM

## 2018-04-18 RX ORDER — ALBUTEROL SULFATE 90 UG/1
AEROSOL, METERED RESPIRATORY (INHALATION)
Qty: 1 INHALER | Refills: 3 | Status: SHIPPED | OUTPATIENT
Start: 2018-04-18 | End: 2018-10-03 | Stop reason: SDUPTHER

## 2018-04-18 RX ORDER — TRAMADOL HYDROCHLORIDE 50 MG/1
100 TABLET ORAL 2 TIMES DAILY
Qty: 120 TABLET | Refills: 0 | Status: SHIPPED | OUTPATIENT
Start: 2018-04-18 | End: 2018-05-15 | Stop reason: SDUPTHER

## 2018-04-19 DIAGNOSIS — Z79.899 ENCOUNTER FOR LONG-TERM (CURRENT) DRUG USE: ICD-10-CM

## 2018-04-19 RX ORDER — LORAZEPAM 0.5 MG/1
TABLET ORAL
Qty: 90 TABLET | Refills: 1 | Status: SHIPPED | OUTPATIENT
Start: 2018-04-19 | End: 2018-04-19 | Stop reason: SDUPTHER

## 2018-04-19 RX ORDER — LORAZEPAM 0.5 MG/1
TABLET ORAL
Qty: 90 TABLET | Refills: 1 | Status: SHIPPED | OUTPATIENT
Start: 2018-04-19 | End: 2018-06-13 | Stop reason: SDUPTHER

## 2018-05-01 DIAGNOSIS — Z79.899 ENCOUNTER FOR LONG-TERM (CURRENT) DRUG USE: Primary | ICD-10-CM

## 2018-05-01 DIAGNOSIS — J20.9 ACUTE BRONCHITIS, UNSPECIFIED ORGANISM: ICD-10-CM

## 2018-05-02 RX ORDER — POTASSIUM CHLORIDE 750 MG/1
TABLET, FILM COATED, EXTENDED RELEASE ORAL
Qty: 60 TABLET | Refills: 5 | Status: SHIPPED | OUTPATIENT
Start: 2018-05-02 | End: 2018-12-07 | Stop reason: HOSPADM

## 2018-05-02 RX ORDER — LEVOFLOXACIN 750 MG/1
TABLET ORAL
Qty: 7 TABLET | Refills: 0 | OUTPATIENT
Start: 2018-05-02

## 2018-05-15 DIAGNOSIS — Z79.899 ENCOUNTER FOR LONG-TERM (CURRENT) DRUG USE: ICD-10-CM

## 2018-05-15 NOTE — TELEPHONE ENCOUNTER
Patient is also requesting Lorazepam   She received script on 4/18 for #90 and 1 refill  I didn't know if it was too soon to fill    She would like it sent to [facility] Q/T

## 2018-05-16 RX ORDER — TRAMADOL HYDROCHLORIDE 50 MG/1
100 TABLET ORAL 2 TIMES DAILY
Qty: 120 TABLET | Refills: 0 | Status: SHIPPED | OUTPATIENT
Start: 2018-05-16 | End: 2018-06-13 | Stop reason: SDUPTHER

## 2018-05-16 RX ORDER — PANTOPRAZOLE SODIUM 40 MG/1
40 TABLET, DELAYED RELEASE ORAL 2 TIMES DAILY
Qty: 60 TABLET | Refills: 5 | Status: SHIPPED | OUTPATIENT
Start: 2018-05-16 | End: 2018-11-13 | Stop reason: ALTCHOICE

## 2018-06-13 ENCOUNTER — TELEPHONE (OUTPATIENT)
Dept: FAMILY MEDICINE CLINIC | Facility: HOSPITAL | Age: 58
End: 2018-06-13

## 2018-06-13 DIAGNOSIS — Z79.899 ENCOUNTER FOR LONG-TERM (CURRENT) DRUG USE: ICD-10-CM

## 2018-06-13 RX ORDER — TRAMADOL HYDROCHLORIDE 50 MG/1
100 TABLET ORAL 2 TIMES DAILY
Qty: 120 TABLET | Refills: 0 | Status: SHIPPED | OUTPATIENT
Start: 2018-06-13 | End: 2018-07-11 | Stop reason: SDUPTHER

## 2018-06-13 RX ORDER — LORAZEPAM 0.5 MG/1
TABLET ORAL
Qty: 90 TABLET | Refills: 0 | Status: SHIPPED | OUTPATIENT
Start: 2018-06-13 | End: 2018-07-11 | Stop reason: SDUPTHER

## 2018-06-13 NOTE — TELEPHONE ENCOUNTER
Lorazepam  5  Insurance rejection  They need extra document because shes on tramadol  There is an increased risk of resp   Depression  Pharm needs verbal consent to continue

## 2018-07-11 DIAGNOSIS — Z79.899 ENCOUNTER FOR LONG-TERM (CURRENT) DRUG USE: ICD-10-CM

## 2018-07-11 RX ORDER — TRAMADOL HYDROCHLORIDE 50 MG/1
100 TABLET ORAL 2 TIMES DAILY
Qty: 120 TABLET | Refills: 0 | Status: SHIPPED | OUTPATIENT
Start: 2018-07-11 | End: 2018-08-10 | Stop reason: SDUPTHER

## 2018-07-11 RX ORDER — LORAZEPAM 0.5 MG/1
TABLET ORAL
Qty: 90 TABLET | Refills: 0 | Status: SHIPPED | OUTPATIENT
Start: 2018-07-11 | End: 2018-08-10 | Stop reason: SDUPTHER

## 2018-08-01 DIAGNOSIS — I50.9 CONGESTIVE HEART FAILURE, UNSPECIFIED HF CHRONICITY, UNSPECIFIED HEART FAILURE TYPE (HCC): Primary | ICD-10-CM

## 2018-08-01 RX ORDER — FUROSEMIDE 20 MG/1
TABLET ORAL
Qty: 360 TABLET | Refills: 3 | Status: SHIPPED | OUTPATIENT
Start: 2018-08-01 | End: 2018-12-07 | Stop reason: HOSPADM

## 2018-08-10 DIAGNOSIS — Z79.899 ENCOUNTER FOR LONG-TERM (CURRENT) DRUG USE: ICD-10-CM

## 2018-08-10 RX ORDER — LORAZEPAM 0.5 MG/1
TABLET ORAL
Qty: 90 TABLET | Refills: 0 | Status: SHIPPED | OUTPATIENT
Start: 2018-08-10 | End: 2018-09-07 | Stop reason: SDUPTHER

## 2018-08-10 RX ORDER — TRAMADOL HYDROCHLORIDE 50 MG/1
100 TABLET ORAL 2 TIMES DAILY
Qty: 120 TABLET | Refills: 0 | Status: SHIPPED | OUTPATIENT
Start: 2018-08-10 | End: 2018-09-07 | Stop reason: SDUPTHER

## 2018-09-07 DIAGNOSIS — Z79.899 ENCOUNTER FOR LONG-TERM (CURRENT) DRUG USE: ICD-10-CM

## 2018-09-07 NOTE — RESULT NOTES
Spoke to pt, has episodes of "cramping" and only urinating a few drops  Has history of bladder neck contracture  Appt today with Dr Iris Hernandez  Verified Results  (1) CBC/PLT/DIFF [id number] 09:37AM Laith Olivares     Test Name Result Flag Reference   WBC 5 5 x10E3/uL  3 4-10 8   RBC 5 43 x10E6/uL H 3 77-5 28   Hemoglobin 12 1 g/dL  11 1-15 9   Hematocrit 40 3 %  34 0-46  6   MCV 74 fL L 79-97   MCH 22 3 pg L 26 6-33 0   MCHC 30 0 g/dL L 31 5-35 7   RDW 19 1 % H 12 3-15 4   Platelets 329 V71L0/WC  150-379   Neutrophils 54 %     Lymphs 27 %     Monocytes 11 %     Eos 7 %     Basos 1 %     Neutrophils (Absolute) 2 9 x10E3/uL  1 4-7 0   Lymphs (Absolute) 1 5 x10E3/uL  0 7-3 1   Monocytes(Absolute) 0 6 x10E3/uL  0 1-0 9   Eos (Absolute) 0 4 x10E3/uL  0 0-0 4   Baso (Absolute) 0 1 x10E3/uL  0 0-0 2   Immature Granulocytes 0 %     Immature Grans (Abs) 0 0 x10E3/uL  0 0-0 1     (1) COMPREHENSIVE METABOLIC PANEL [id number] 80:39IO Laith Olivares     Test Name Result Flag Reference   Glucose, Serum 323 mg/dL H 65-99   BUN 5 mg/dL L 6-24   Creatinine, Serum 0 58 mg/dL  0 57-1 00   eGFR If NonAfricn Am 102 mL/min/1 73  >59   eGFR If Africn Am 117 mL/min/1 73  >59   BUN/Creatinine Ratio 9  9-23   Sodium, Serum 140 mmol/L  134-144   Potassium, Serum 4 2 mmol/L  3 5-5 2   Chloride, Serum 95 mmol/L L    Carbon Dioxide, Total 26 mmol/L  18-29   Calcium, Serum 9 1 mg/dL  8 7-10 2   Protein, Total, Serum 6 1 g/dL  6 0-8 5   Albumin, Serum 4 2 g/dL  3 5-5 5   Globulin, Total 1 9 g/dL  1 5-4 5   A/G Ratio 2 2  1 2-2 2   **Please note reference interval change**   Bilirubin, Total 0 4 mg/dL  0 0-1 2   Alkaline Phosphatase, S 120 IU/L H    AST (SGOT) 20 IU/L  0-40   ALT (SGPT) 24 IU/L  0-32     (1) HEMOGLOBIN A1C [id number] 09:37AM Laith Olivares   A courtesy copy of this report has been sent to  [facility], the patient       Test Name Result Flag Reference   Hemoglobin A1c 10 9 % H 4 8-5 6   Pre-diabetes: 5 7 - 6 4           Diabetes: >6 4           Glycemic control for adults with diabetes: <7 0     (1) LIPID PANEL, FASTING [id number] 09: 37AM MarshallLaith     Test Name Result Flag Reference   Cholesterol, Total 134 mg/dL  100-199   Triglycerides 132 mg/dL  0-149   HDL Cholesterol 50 mg/dL  >39   VLDL Cholesterol Antwan 26 mg/dL  5-40   LDL Cholesterol Calc 58 mg/dL  0-99   T  Chol/HDL Ratio 2 7 ratio units  0 0-4 4   T  Chol/HDL Ratio                                                             Men  Women                                               1/2 Avg  Risk  3 4    3 3                                                   Avg Risk  5 0    4 4                                                2X Avg  Risk  9 6    7 1                                                3X Avg  Risk 23 4   11 0     (1) MICROALBUMIN CREATININE RATIO, RANDOM URINE [id number] 09:37AM MarshallLaith     Test Name Result Flag Reference   Creatinine, Urine 68 7 mg/dL  Not Estab  Microalbumin, Urine 5 3 ug/mL  Not Estab  Microalb/Creat Ratio 7 7 mg/g creat  0 0-30 0       Discussion/Summary    please call  diabetes very uncontrolled  need to discuss at apt that I do think is already scheduled       pt aware, appt scheduled for 3/29

## 2018-09-08 RX ORDER — LORAZEPAM 0.5 MG/1
TABLET ORAL
Qty: 90 TABLET | Refills: 0 | Status: SHIPPED | OUTPATIENT
Start: 2018-09-08 | End: 2018-10-03 | Stop reason: SDUPTHER

## 2018-09-08 RX ORDER — TRAMADOL HYDROCHLORIDE 50 MG/1
100 TABLET ORAL 2 TIMES DAILY
Qty: 120 TABLET | Refills: 0 | Status: SHIPPED | OUTPATIENT
Start: 2018-09-08 | End: 2018-10-03 | Stop reason: SDUPTHER

## 2018-10-03 DIAGNOSIS — Z79.899 ENCOUNTER FOR LONG-TERM (CURRENT) DRUG USE: ICD-10-CM

## 2018-10-03 DIAGNOSIS — J45.909 UNCOMPLICATED ASTHMA, UNSPECIFIED ASTHMA SEVERITY, UNSPECIFIED WHETHER PERSISTENT: ICD-10-CM

## 2018-10-03 DIAGNOSIS — G25.81 RESTLESS LEGS SYNDROME (RLS): ICD-10-CM

## 2018-10-03 RX ORDER — TRAMADOL HYDROCHLORIDE 50 MG/1
50 TABLET ORAL 2 TIMES DAILY
Qty: 120 TABLET | Refills: 0 | Status: SHIPPED | OUTPATIENT
Start: 2018-10-03 | End: 2018-11-05 | Stop reason: SDUPTHER

## 2018-10-03 RX ORDER — ALBUTEROL SULFATE 90 UG/1
AEROSOL, METERED RESPIRATORY (INHALATION)
Qty: 1 INHALER | Refills: 0 | Status: SHIPPED | OUTPATIENT
Start: 2018-10-03 | End: 2018-10-18 | Stop reason: SDUPTHER

## 2018-10-03 RX ORDER — LORAZEPAM 0.5 MG/1
TABLET ORAL
Qty: 90 TABLET | Refills: 0 | Status: SHIPPED | OUTPATIENT
Start: 2018-10-03 | End: 2018-11-05 | Stop reason: SDUPTHER

## 2018-10-03 RX ORDER — ROPINIROLE 2 MG/1
2 TABLET, FILM COATED ORAL
Qty: 30 TABLET | Refills: 0 | Status: SHIPPED | OUTPATIENT
Start: 2018-10-03 | End: 2018-11-05 | Stop reason: SDUPTHER

## 2018-10-04 DIAGNOSIS — G47.00 INSOMNIA, UNSPECIFIED TYPE: ICD-10-CM

## 2018-10-04 DIAGNOSIS — J44.9 CHRONIC OBSTRUCTIVE PULMONARY DISEASE, UNSPECIFIED COPD TYPE (HCC): ICD-10-CM

## 2018-10-04 RX ORDER — METFORMIN HYDROCHLORIDE 500 MG/1
TABLET, EXTENDED RELEASE ORAL
Qty: 120 TABLET | Refills: 5 | Status: SHIPPED | OUTPATIENT
Start: 2018-10-04 | End: 2019-03-27 | Stop reason: SDUPTHER

## 2018-10-04 RX ORDER — TRAZODONE HYDROCHLORIDE 150 MG/1
TABLET ORAL
Qty: 30 TABLET | Refills: 5 | Status: SHIPPED | OUTPATIENT
Start: 2018-10-04 | End: 2019-05-16 | Stop reason: SDUPTHER

## 2018-10-18 DIAGNOSIS — J45.909 UNCOMPLICATED ASTHMA, UNSPECIFIED ASTHMA SEVERITY, UNSPECIFIED WHETHER PERSISTENT: ICD-10-CM

## 2018-11-05 DIAGNOSIS — Z79.899 ENCOUNTER FOR LONG-TERM (CURRENT) DRUG USE: ICD-10-CM

## 2018-11-05 DIAGNOSIS — J45.909 UNCOMPLICATED ASTHMA, UNSPECIFIED ASTHMA SEVERITY, UNSPECIFIED WHETHER PERSISTENT: ICD-10-CM

## 2018-11-05 DIAGNOSIS — G25.81 RESTLESS LEGS SYNDROME (RLS): ICD-10-CM

## 2018-11-05 RX ORDER — LORAZEPAM 0.5 MG/1
TABLET ORAL
Qty: 90 TABLET | Refills: 0 | Status: SHIPPED | OUTPATIENT
Start: 2018-11-05 | End: 2018-12-07 | Stop reason: SDUPTHER

## 2018-11-05 RX ORDER — ALBUTEROL SULFATE 90 UG/1
1 AEROSOL, METERED RESPIRATORY (INHALATION) EVERY 4 HOURS PRN
Qty: 2 INHALER | Refills: 2 | Status: SHIPPED | OUTPATIENT
Start: 2018-11-05 | End: 2019-05-24 | Stop reason: SDUPTHER

## 2018-11-05 RX ORDER — TRAMADOL HYDROCHLORIDE 50 MG/1
50 TABLET ORAL 2 TIMES DAILY
Qty: 120 TABLET | Refills: 0 | Status: SHIPPED | OUTPATIENT
Start: 2018-11-05 | End: 2018-11-12 | Stop reason: SDUPTHER

## 2018-11-05 RX ORDER — ROPINIROLE 2 MG/1
2 TABLET, FILM COATED ORAL
Qty: 30 TABLET | Refills: 0 | Status: ON HOLD | OUTPATIENT
Start: 2018-11-05 | End: 2018-12-07 | Stop reason: SDUPTHER

## 2018-11-12 DIAGNOSIS — Z79.899 ENCOUNTER FOR LONG-TERM (CURRENT) DRUG USE: ICD-10-CM

## 2018-11-13 ENCOUNTER — OFFICE VISIT (OUTPATIENT)
Dept: FAMILY MEDICINE CLINIC | Facility: HOSPITAL | Age: 58
End: 2018-11-13

## 2018-11-13 VITALS
HEIGHT: 63 IN | SYSTOLIC BLOOD PRESSURE: 136 MMHG | BODY MASS INDEX: 46.74 KG/M2 | DIASTOLIC BLOOD PRESSURE: 82 MMHG | TEMPERATURE: 97 F | HEART RATE: 80 BPM | WEIGHT: 263.8 LBS | OXYGEN SATURATION: 92 %

## 2018-11-13 DIAGNOSIS — J45.31 MILD PERSISTENT ASTHMA WITH EXACERBATION: ICD-10-CM

## 2018-11-13 DIAGNOSIS — J20.9 ACUTE BRONCHITIS, UNSPECIFIED ORGANISM: Primary | ICD-10-CM

## 2018-11-13 PROCEDURE — 99214 OFFICE O/P EST MOD 30 MIN: CPT | Performed by: NURSE PRACTITIONER

## 2018-11-13 RX ORDER — AZITHROMYCIN 250 MG/1
TABLET, FILM COATED ORAL
Qty: 6 TABLET | Refills: 0 | Status: SHIPPED | OUTPATIENT
Start: 2018-11-13 | End: 2018-11-17

## 2018-11-13 RX ORDER — TRAMADOL HYDROCHLORIDE 50 MG/1
50 TABLET ORAL EVERY 6 HOURS PRN
COMMUNITY
End: 2018-11-13 | Stop reason: ALTCHOICE

## 2018-11-13 RX ORDER — ALBUTEROL SULFATE 90 UG/1
1 AEROSOL, METERED RESPIRATORY (INHALATION) EVERY 6 HOURS PRN
COMMUNITY
End: 2018-11-13 | Stop reason: ALTCHOICE

## 2018-11-13 RX ORDER — LORAZEPAM 0.5 MG/1
0.5 TABLET ORAL EVERY 6 HOURS PRN
COMMUNITY
End: 2018-11-13 | Stop reason: SDUPTHER

## 2018-11-13 RX ORDER — PREDNISONE 10 MG/1
TABLET ORAL
Qty: 32 TABLET | Refills: 0 | Status: SHIPPED | OUTPATIENT
Start: 2018-11-13 | End: 2018-12-07 | Stop reason: HOSPADM

## 2018-11-13 NOTE — PROGRESS NOTES
Assessment/Plan:     Acute bronchitis with asthma exacerbation  Treat with antibiotic and prednisone  Continue with albuterol nebs and inhaler  Restart Sarwat or Jyoti Bunch (whichever she has the most of)  Discussed with pt that should her sob worsen she will need to be seen in ER  Call if not improving  Diagnoses and all orders for this visit:    Acute bronchitis, unspecified organism  -     azithromycin (ZITHROMAX) 250 mg tablet; Take 2 tablets today then 1 tablet daily x 4 days  -     predniSONE 10 mg tablet; 4 tabs x 3 days, 3 tabs x 3 days 2 tabs x 3 days 1 tab x 3 days, 1/2 tab x 3 days then stop    Mild persistent asthma with exacerbation  -     azithromycin (ZITHROMAX) 250 mg tablet; Take 2 tablets today then 1 tablet daily x 4 days  -     predniSONE 10 mg tablet; 4 tabs x 3 days, 3 tabs x 3 days 2 tabs x 3 days 1 tab x 3 days, 1/2 tab x 3 days then stop    Other orders  -     Discontinue: albuterol (PROVENTIL HFA,VENTOLIN HFA) 90 mcg/act inhaler; Inhale 1 puff every 6 (six) hours as needed  -     Discontinue: LORazepam (ATIVAN) 0 5 mg tablet; Take 0 5 mg by mouth every 6 (six) hours as needed  -     Discontinue: traMADol (ULTRAM) 50 mg tablet; Take 50 mg by mouth every 6 (six) hours as needed          Subjective:     Patient ID: Miguelangel Jaime is a 59 y o  female  Has bad asthma  Having severe cough with difficulty breathing  Bringing up thick white mucus  Has been going on for 2 weeks  Keeps her awake at night  Wears CPAP at night  Using nebulizer and albuterol inhaler  Taking Delsym  Has Breo and Dulera at home but has not been using them  Pt does not have insurance and cannot afford these inhalers  Has increased wheezing  Denies fever but has chills  Has nasal congestion and runny nose  Review of Systems   Constitutional: Positive for chills  Negative for fever  HENT: Positive for congestion  Negative for ear pain and sore throat      Respiratory: Positive for cough, chest tightness, shortness of breath and wheezing  The following portions of the patient's history were reviewed and updated as appropriate: allergies, current medications, past family history, past medical history, past social history, past surgical history and problem list     Objective:  Vitals:    11/13/18 1246   BP: 136/82   Pulse: 80   Temp: (!) 97 °F (36 1 °C)   SpO2: 92%      Physical Exam   Constitutional: She is oriented to person, place, and time  She appears well-developed and well-nourished  HENT:   Right Ear: Tympanic membrane, external ear and ear canal normal    Left Ear: Tympanic membrane, external ear and ear canal normal    Mouth/Throat: Uvula is midline, oropharynx is clear and moist and mucous membranes are normal    Cardiovascular: Normal rate, regular rhythm and normal heart sounds  Pulmonary/Chest: Effort normal  No tachypnea  No respiratory distress  She has wheezes  She has no rhonchi  She has no rales  Lymphadenopathy:     She has no cervical adenopathy  Neurological: She is alert and oriented to person, place, and time  Skin: Skin is warm and dry  Psychiatric: She has a normal mood and affect

## 2018-11-14 RX ORDER — TRAMADOL HYDROCHLORIDE 50 MG/1
TABLET ORAL
Qty: 120 TABLET | Refills: 0
Start: 2018-11-14 | End: 2018-12-07 | Stop reason: SDUPTHER

## 2018-11-14 NOTE — TELEPHONE ENCOUNTER
She saw Kay Miguel yesterday for bronchitis - she is currently without ins and is in the process of trying to obtain it  Will f/u with us again once she has ins

## 2018-11-16 DIAGNOSIS — Z79.899 ENCOUNTER FOR LONG-TERM (CURRENT) DRUG USE: ICD-10-CM

## 2018-11-19 RX ORDER — TRAMADOL HYDROCHLORIDE 50 MG/1
TABLET ORAL
Qty: 120 TABLET | Refills: 0 | OUTPATIENT
Start: 2018-11-19

## 2018-11-30 ENCOUNTER — APPOINTMENT (EMERGENCY)
Dept: CT IMAGING | Facility: HOSPITAL | Age: 58
DRG: 194 | End: 2018-11-30
Payer: COMMERCIAL

## 2018-11-30 ENCOUNTER — OFFICE VISIT (OUTPATIENT)
Dept: FAMILY MEDICINE CLINIC | Facility: HOSPITAL | Age: 58
End: 2018-11-30
Payer: COMMERCIAL

## 2018-11-30 ENCOUNTER — HOSPITAL ENCOUNTER (INPATIENT)
Facility: HOSPITAL | Age: 58
LOS: 7 days | Discharge: HOME | DRG: 194 | End: 2018-12-07
Attending: EMERGENCY MEDICINE | Admitting: HOSPITALIST
Payer: COMMERCIAL

## 2018-11-30 VITALS
TEMPERATURE: 97.5 F | HEIGHT: 63 IN | DIASTOLIC BLOOD PRESSURE: 88 MMHG | SYSTOLIC BLOOD PRESSURE: 136 MMHG | BODY MASS INDEX: 47.45 KG/M2 | OXYGEN SATURATION: 93 % | HEART RATE: 138 BPM | WEIGHT: 267.8 LBS

## 2018-11-30 DIAGNOSIS — J44.9 CHRONIC OBSTRUCTIVE ASTHMA (HCC): ICD-10-CM

## 2018-11-30 DIAGNOSIS — G47.33 OBSTRUCTIVE SLEEP APNEA: ICD-10-CM

## 2018-11-30 DIAGNOSIS — R93.5 ABNORMAL CT OF THE ABDOMEN: ICD-10-CM

## 2018-11-30 DIAGNOSIS — I50.9 ACUTE CONGESTIVE HEART FAILURE, UNSPECIFIED HEART FAILURE TYPE (HCC): ICD-10-CM

## 2018-11-30 DIAGNOSIS — J45.21 MILD INTERMITTENT ACUTE ASTHMATIC BRONCHITIS WITH ACUTE EXACERBATION: ICD-10-CM

## 2018-11-30 DIAGNOSIS — I48.92 ATRIAL FLUTTER, UNSPECIFIED TYPE (HCC): ICD-10-CM

## 2018-11-30 DIAGNOSIS — Z72.0 TOBACCO USE: ICD-10-CM

## 2018-11-30 DIAGNOSIS — I50.31 ACUTE DIASTOLIC CONGESTIVE HEART FAILURE (HCC): ICD-10-CM

## 2018-11-30 DIAGNOSIS — I48.91 ATRIAL FIBRILLATION, UNSPECIFIED TYPE (HCC): Primary | ICD-10-CM

## 2018-11-30 DIAGNOSIS — J45.51 SEVERE PERSISTENT ASTHMA WITH ACUTE EXACERBATION: Primary | ICD-10-CM

## 2018-11-30 DIAGNOSIS — J45.51 SEVERE PERSISTENT ASTHMA WITH ACUTE EXACERBATION: ICD-10-CM

## 2018-11-30 DIAGNOSIS — D50.0 IRON DEFICIENCY ANEMIA DUE TO CHRONIC BLOOD LOSS: ICD-10-CM

## 2018-11-30 DIAGNOSIS — E11.9 DIABETES MELLITUS (HCC): ICD-10-CM

## 2018-11-30 DIAGNOSIS — J96.01 ACUTE RESPIRATORY FAILURE WITH HYPOXIA (HCC): ICD-10-CM

## 2018-11-30 LAB
ALBUMIN SERPL BCP-MCNC: 3.4 G/DL (ref 3.5–5)
ALP SERPL-CCNC: 138 U/L (ref 46–116)
ALT SERPL W P-5'-P-CCNC: 59 U/L (ref 12–78)
ANION GAP SERPL CALCULATED.3IONS-SCNC: 6 MMOL/L (ref 4–13)
APTT PPP: 29 SECONDS (ref 26–38)
AST SERPL W P-5'-P-CCNC: 32 U/L (ref 5–45)
BASOPHILS # BLD AUTO: 0.06 THOUSANDS/ΜL (ref 0–0.1)
BASOPHILS NFR BLD AUTO: 1 % (ref 0–1)
BILIRUB SERPL-MCNC: 0.4 MG/DL (ref 0.2–1)
BUN SERPL-MCNC: 7 MG/DL (ref 5–25)
CALCIUM SERPL-MCNC: 8.5 MG/DL (ref 8.3–10.1)
CHLORIDE SERPL-SCNC: 94 MMOL/L (ref 100–108)
CO2 SERPL-SCNC: 36 MMOL/L (ref 21–32)
CREAT SERPL-MCNC: 0.73 MG/DL (ref 0.6–1.3)
EOSINOPHIL # BLD AUTO: 0.21 THOUSAND/ΜL (ref 0–0.61)
EOSINOPHIL NFR BLD AUTO: 2 % (ref 0–6)
ERYTHROCYTE [DISTWIDTH] IN BLOOD BY AUTOMATED COUNT: 19.8 % (ref 11.6–15.1)
GFR SERPL CREATININE-BSD FRML MDRD: 90 ML/MIN/1.73SQ M
GLUCOSE SERPL-MCNC: 187 MG/DL (ref 65–140)
GLUCOSE SERPL-MCNC: 214 MG/DL (ref 65–140)
HCT VFR BLD AUTO: 36.5 % (ref 34.8–46.1)
HGB BLD-MCNC: 10.8 G/DL (ref 11.5–15.4)
IMM GRANULOCYTES # BLD AUTO: 0.06 THOUSAND/UL (ref 0–0.2)
IMM GRANULOCYTES NFR BLD AUTO: 1 % (ref 0–2)
INR PPP: 1.22 (ref 0.86–1.17)
LYMPHOCYTES # BLD AUTO: 1.64 THOUSANDS/ΜL (ref 0.6–4.47)
LYMPHOCYTES NFR BLD AUTO: 17 % (ref 14–44)
MAGNESIUM SERPL-MCNC: 1.8 MG/DL (ref 1.6–2.6)
MAGNESIUM SERPL-MCNC: 1.9 MG/DL (ref 1.6–2.6)
MCH RBC QN AUTO: 20.7 PG (ref 26.8–34.3)
MCHC RBC AUTO-ENTMCNC: 29.6 G/DL (ref 31.4–37.4)
MCV RBC AUTO: 70 FL (ref 82–98)
MONOCYTES # BLD AUTO: 0.83 THOUSAND/ΜL (ref 0.17–1.22)
MONOCYTES NFR BLD AUTO: 8 % (ref 4–12)
NEUTROPHILS # BLD AUTO: 7.08 THOUSANDS/ΜL (ref 1.85–7.62)
NEUTS SEG NFR BLD AUTO: 71 % (ref 43–75)
NRBC BLD AUTO-RTO: 0 /100 WBCS
NT-PROBNP SERPL-MCNC: 692 PG/ML
PLATELET # BLD AUTO: 346 THOUSANDS/UL (ref 149–390)
PMV BLD AUTO: 10.3 FL (ref 8.9–12.7)
POTASSIUM SERPL-SCNC: 3.7 MMOL/L (ref 3.5–5.3)
PROT SERPL-MCNC: 6.5 G/DL (ref 6.4–8.2)
PROTHROMBIN TIME: 14.7 SECONDS (ref 11.8–14.2)
RBC # BLD AUTO: 5.22 MILLION/UL (ref 3.81–5.12)
SODIUM SERPL-SCNC: 136 MMOL/L (ref 136–145)
TROPONIN I SERPL-MCNC: <0.02 NG/ML
TROPONIN I SERPL-MCNC: <0.02 NG/ML
TSH SERPL DL<=0.05 MIU/L-ACNC: 3.26 UIU/ML (ref 0.36–3.74)
WBC # BLD AUTO: 9.88 THOUSAND/UL (ref 4.31–10.16)

## 2018-11-30 PROCEDURE — 94660 CPAP INITIATION&MGMT: CPT

## 2018-11-30 PROCEDURE — 82948 REAGENT STRIP/BLOOD GLUCOSE: CPT

## 2018-11-30 PROCEDURE — 83880 ASSAY OF NATRIURETIC PEPTIDE: CPT | Performed by: NURSE PRACTITIONER

## 2018-11-30 PROCEDURE — 85610 PROTHROMBIN TIME: CPT | Performed by: NURSE PRACTITIONER

## 2018-11-30 PROCEDURE — 83735 ASSAY OF MAGNESIUM: CPT | Performed by: NURSE PRACTITIONER

## 2018-11-30 PROCEDURE — 84443 ASSAY THYROID STIM HORMONE: CPT | Performed by: NURSE PRACTITIONER

## 2018-11-30 PROCEDURE — 94640 AIRWAY INHALATION TREATMENT: CPT

## 2018-11-30 PROCEDURE — 99214 OFFICE O/P EST MOD 30 MIN: CPT | Performed by: NURSE PRACTITIONER

## 2018-11-30 PROCEDURE — 99223 1ST HOSP IP/OBS HIGH 75: CPT | Performed by: NURSE PRACTITIONER

## 2018-11-30 PROCEDURE — 36415 COLL VENOUS BLD VENIPUNCTURE: CPT | Performed by: NURSE PRACTITIONER

## 2018-11-30 PROCEDURE — 71275 CT ANGIOGRAPHY CHEST: CPT

## 2018-11-30 PROCEDURE — 84484 ASSAY OF TROPONIN QUANT: CPT | Performed by: NURSE PRACTITIONER

## 2018-11-30 PROCEDURE — 94664 DEMO&/EVAL PT USE INHALER: CPT

## 2018-11-30 PROCEDURE — 74177 CT ABD & PELVIS W/CONTRAST: CPT

## 2018-11-30 PROCEDURE — 94760 N-INVAS EAR/PLS OXIMETRY 1: CPT

## 2018-11-30 PROCEDURE — 93005 ELECTROCARDIOGRAM TRACING: CPT

## 2018-11-30 PROCEDURE — 85025 COMPLETE CBC W/AUTO DIFF WBC: CPT | Performed by: NURSE PRACTITIONER

## 2018-11-30 PROCEDURE — 99285 EMERGENCY DEPT VISIT HI MDM: CPT

## 2018-11-30 PROCEDURE — 96374 THER/PROPH/DIAG INJ IV PUSH: CPT

## 2018-11-30 PROCEDURE — 85730 THROMBOPLASTIN TIME PARTIAL: CPT | Performed by: NURSE PRACTITIONER

## 2018-11-30 PROCEDURE — 80053 COMPREHEN METABOLIC PANEL: CPT | Performed by: NURSE PRACTITIONER

## 2018-11-30 RX ORDER — MELATONIN
1000 DAILY
Status: DISCONTINUED | OUTPATIENT
Start: 2018-12-01 | End: 2018-12-07 | Stop reason: HOSPADM

## 2018-11-30 RX ORDER — ALBUTEROL SULFATE 90 UG/1
2 AEROSOL, METERED RESPIRATORY (INHALATION) EVERY 4 HOURS PRN
Status: DISCONTINUED | OUTPATIENT
Start: 2018-11-30 | End: 2018-12-07 | Stop reason: HOSPADM

## 2018-11-30 RX ORDER — PANTOPRAZOLE SODIUM 40 MG/1
40 TABLET, DELAYED RELEASE ORAL
Status: DISCONTINUED | OUTPATIENT
Start: 2018-12-01 | End: 2018-12-07 | Stop reason: HOSPADM

## 2018-11-30 RX ORDER — DILTIAZEM HYDROCHLORIDE 5 MG/ML
20 INJECTION INTRAVENOUS ONCE
Status: COMPLETED | OUTPATIENT
Start: 2018-11-30 | End: 2018-11-30

## 2018-11-30 RX ORDER — FUROSEMIDE 10 MG/ML
40 INJECTION INTRAMUSCULAR; INTRAVENOUS 2 TIMES DAILY
Status: DISCONTINUED | OUTPATIENT
Start: 2018-11-30 | End: 2018-12-04

## 2018-11-30 RX ORDER — WARFARIN SODIUM 5 MG/1
5 TABLET ORAL
Status: DISCONTINUED | OUTPATIENT
Start: 2018-11-30 | End: 2018-12-01

## 2018-11-30 RX ORDER — ONDANSETRON 2 MG/ML
4 INJECTION INTRAMUSCULAR; INTRAVENOUS EVERY 6 HOURS PRN
Status: DISCONTINUED | OUTPATIENT
Start: 2018-11-30 | End: 2018-12-07 | Stop reason: HOSPADM

## 2018-11-30 RX ORDER — TRAMADOL HYDROCHLORIDE 50 MG/1
100 TABLET ORAL EVERY 12 HOURS
Status: DISCONTINUED | OUTPATIENT
Start: 2018-11-30 | End: 2018-11-30

## 2018-11-30 RX ORDER — GUAIFENESIN 100 MG/5ML
200 SOLUTION ORAL EVERY 4 HOURS PRN
Status: DISCONTINUED | OUTPATIENT
Start: 2018-11-30 | End: 2018-12-07 | Stop reason: HOSPADM

## 2018-11-30 RX ORDER — METFORMIN HYDROCHLORIDE 500 MG/1
1000 TABLET, EXTENDED RELEASE ORAL 2 TIMES DAILY WITH MEALS
Status: DISCONTINUED | OUTPATIENT
Start: 2018-11-30 | End: 2018-12-01

## 2018-11-30 RX ORDER — TRAZODONE HYDROCHLORIDE 150 MG/1
150 TABLET ORAL
Status: DISCONTINUED | OUTPATIENT
Start: 2018-11-30 | End: 2018-12-07 | Stop reason: HOSPADM

## 2018-11-30 RX ORDER — METOPROLOL TARTRATE 5 MG/5ML
5 INJECTION INTRAVENOUS EVERY 6 HOURS PRN
Status: DISCONTINUED | OUTPATIENT
Start: 2018-11-30 | End: 2018-12-07 | Stop reason: HOSPADM

## 2018-11-30 RX ORDER — IPRATROPIUM BROMIDE AND ALBUTEROL SULFATE 2.5; .5 MG/3ML; MG/3ML
3 SOLUTION RESPIRATORY (INHALATION) ONCE
Status: COMPLETED | OUTPATIENT
Start: 2018-11-30 | End: 2018-11-30

## 2018-11-30 RX ORDER — ACETAMINOPHEN 325 MG/1
650 TABLET ORAL EVERY 4 HOURS PRN
Status: DISCONTINUED | OUTPATIENT
Start: 2018-11-30 | End: 2018-12-07 | Stop reason: HOSPADM

## 2018-11-30 RX ORDER — LORAZEPAM 1 MG/1
1 TABLET ORAL
Status: DISCONTINUED | OUTPATIENT
Start: 2018-11-30 | End: 2018-11-30

## 2018-11-30 RX ORDER — METHYLPREDNISOLONE SODIUM SUCCINATE 40 MG/ML
40 INJECTION, POWDER, LYOPHILIZED, FOR SOLUTION INTRAMUSCULAR; INTRAVENOUS EVERY 6 HOURS SCHEDULED
Status: DISCONTINUED | OUTPATIENT
Start: 2018-11-30 | End: 2018-12-01

## 2018-11-30 RX ORDER — FUROSEMIDE 10 MG/ML
40 INJECTION INTRAMUSCULAR; INTRAVENOUS 2 TIMES DAILY
Status: DISCONTINUED | OUTPATIENT
Start: 2018-12-01 | End: 2018-11-30

## 2018-11-30 RX ORDER — LEVALBUTEROL INHALATION SOLUTION 0.63 MG/3ML
0.63 SOLUTION RESPIRATORY (INHALATION) EVERY 6 HOURS PRN
Status: DISCONTINUED | OUTPATIENT
Start: 2018-11-30 | End: 2018-12-07 | Stop reason: HOSPADM

## 2018-11-30 RX ORDER — IPRATROPIUM BROMIDE AND ALBUTEROL SULFATE 2.5; .5 MG/3ML; MG/3ML
3 SOLUTION RESPIRATORY (INHALATION)
Status: DISCONTINUED | OUTPATIENT
Start: 2018-11-30 | End: 2018-11-30

## 2018-11-30 RX ORDER — DILTIAZEM HYDROCHLORIDE 5 MG/ML
15 INJECTION INTRAVENOUS ONCE
Status: DISCONTINUED | OUTPATIENT
Start: 2018-11-30 | End: 2018-11-30

## 2018-11-30 RX ORDER — ASCORBIC ACID 500 MG
1000 TABLET ORAL DAILY
Status: DISCONTINUED | OUTPATIENT
Start: 2018-12-01 | End: 2018-12-07 | Stop reason: HOSPADM

## 2018-11-30 RX ORDER — POTASSIUM CHLORIDE 750 MG/1
10 TABLET, EXTENDED RELEASE ORAL 2 TIMES DAILY
Status: DISCONTINUED | OUTPATIENT
Start: 2018-11-30 | End: 2018-12-04

## 2018-11-30 RX ORDER — ROPINIROLE 1 MG/1
2 TABLET, FILM COATED ORAL
Status: DISCONTINUED | OUTPATIENT
Start: 2018-11-30 | End: 2018-12-07 | Stop reason: HOSPADM

## 2018-11-30 RX ORDER — METOPROLOL SUCCINATE 50 MG/1
50 TABLET, EXTENDED RELEASE ORAL DAILY
Status: DISCONTINUED | OUTPATIENT
Start: 2018-12-01 | End: 2018-12-07 | Stop reason: HOSPADM

## 2018-11-30 RX ADMIN — POTASSIUM CHLORIDE 10 MEQ: 750 TABLET, EXTENDED RELEASE ORAL at 21:16

## 2018-11-30 RX ADMIN — FUROSEMIDE 40 MG: 10 INJECTION, SOLUTION INTRAVENOUS at 21:08

## 2018-11-30 RX ADMIN — SODIUM CHLORIDE 250 ML: 0.9 INJECTION, SOLUTION INTRAVENOUS at 16:24

## 2018-11-30 RX ADMIN — IPRATROPIUM BROMIDE AND ALBUTEROL SULFATE 3 ML: 2.5; .5 SOLUTION RESPIRATORY (INHALATION) at 21:07

## 2018-11-30 RX ADMIN — DILTIAZEM HYDROCHLORIDE 20 MG: 5 INJECTION INTRAVENOUS at 21:07

## 2018-11-30 RX ADMIN — IOHEXOL 100 ML: 350 INJECTION, SOLUTION INTRAVENOUS at 16:01

## 2018-11-30 RX ADMIN — TRAZODONE HYDROCHLORIDE 150 MG: 150 TABLET ORAL at 22:45

## 2018-11-30 RX ADMIN — METOPROLOL TARTRATE 5 MG: 1 INJECTION, SOLUTION INTRAVENOUS at 23:17

## 2018-11-30 RX ADMIN — ROPINIROLE 2 MG: 2 TABLET, FILM COATED ORAL at 22:44

## 2018-11-30 RX ADMIN — DILTIAZEM HYDROCHLORIDE 20 MG: 5 INJECTION INTRAVENOUS at 16:35

## 2018-11-30 RX ADMIN — METHYLPREDNISOLONE SODIUM SUCCINATE 40 MG: 40 INJECTION, POWDER, FOR SOLUTION INTRAMUSCULAR; INTRAVENOUS at 21:20

## 2018-11-30 RX ADMIN — TRAMADOL HYDROCHLORIDE 100 MG: 50 TABLET, COATED ORAL at 21:17

## 2018-11-30 RX ADMIN — WARFARIN SODIUM 5 MG: 5 TABLET ORAL at 21:17

## 2018-11-30 RX ADMIN — METFORMIN HYDROCHLORIDE 1000 MG: 500 TABLET, EXTENDED RELEASE ORAL at 21:18

## 2018-11-30 RX ADMIN — INSULIN LISPRO 1 UNITS: 100 INJECTION, SOLUTION INTRAVENOUS; SUBCUTANEOUS at 21:15

## 2018-11-30 RX ADMIN — IPRATROPIUM BROMIDE AND ALBUTEROL SULFATE 3 ML: 2.5; .5 SOLUTION RESPIRATORY (INHALATION) at 15:04

## 2018-11-30 RX ADMIN — LORAZEPAM 1 MG: 1 TABLET ORAL at 21:16

## 2018-11-30 NOTE — PROGRESS NOTES
Assessment/Plan:    No problem-specific Assessment & Plan notes found for this encounter  Diagnoses and all orders for this visit:    Severe persistent asthma with acute exacerbation  Comments:  given non-response to recent antibiotic, oral steroid and nebulizer treatments w/SOB and tachycardia, will refer to ER for STAT evaluation - pt agreeable       Transported to ER via wheelchair by MA w/  Report phoned to ER staff  Subjective:      Patient ID: Israel Wray is a 59 y o  female here for an acute visit  Thought she was feeling better after last appt a couple weeks ago  Has been under a lot of stress with dad passing away 2 weeks ago and having to care for her mom  All she wants to do is sleep  Can't breathe well when she lays down  Has coughing spells up to 40 mins  Drinks water and takes inhaler or nebulizer and eventually it relaxes  Completed antibiotic and prednisone since last appt  Uses nebulizer 4x/day but cannot recall if she had yet today  Not using breo every day, she's not sure why  The following portions of the patient's history were reviewed and updated as appropriate: allergies, current medications, past medical history, past social history and problem list     Review of Systems   Constitutional: Positive for activity change, chills (on/off) and fatigue  Negative for appetite change and fever  Respiratory: Positive for cough, shortness of breath and wheezing  Cardiovascular: Negative for chest pain and palpitations  Psychiatric/Behavioral: Positive for dysphoric mood (grieving)  Objective:      /88 (Patient Position: Sitting, Cuff Size: Large)   Pulse (!) 138   Temp 97 5 °F (36 4 °C) (Tympanic)   Ht 5' 3 2" (1 605 m)   Wt 121 kg (267 lb 12 8 oz)   SpO2 93%   BMI 47 14 kg/m²          Physical Exam   Constitutional: She is oriented to person, place, and time  She appears well-developed and well-nourished  No distress     HENT:   Head: Normocephalic and atraumatic  Eyes: Conjunctivae are normal  No scleral icterus  Cardiovascular: Regular rhythm  Tachycardia present  No murmur heard  Pulmonary/Chest: Effort normal    Decreased breath sounds w/diffuse wheezes  Deep cough w/thick clear phlegm  Mild respiratory distress   Lymphadenopathy:     She has no cervical adenopathy  Neurological: She is alert and oriented to person, place, and time  Psychiatric:   Anxious     Vitals reviewed

## 2018-11-30 NOTE — ED PROVIDER NOTES
History  Chief Complaint   Patient presents with    Asthma     Patient presents to the ER stating she was sent from dr Agustín Hoff office for recurrent asthma symptoms after a course of prednisone and antibiotics  sent for cxr  This is an obese 59year old female who has a PMH of tobacco use, CHF, COPD, DM, anemia who has been on antibiotics and prednisone for the last week w/o relief prescribed by her PCP  Pt was sent to the ED for A CXR by her PCP  Pt states prednisone finished yesterday  Pt states she is SOB  She denies fevers, chills, n/v/d  She denies palpitations  She has been under stress as her father just passed away  Pt can not remember if she had her nebulizer today  History provided by:  Patient and medical records   used: No        Prior to Admission Medications   Prescriptions Last Dose Informant Patient Reported? Taking?    Blood Glucose Monitoring Suppl (NABILA CONTOUR NEXT MONITOR) w/Device KIT   Yes No   Sig: by Does not apply route daily   Cholecalciferol 1000 units tablet   Yes No   Sig: Take 1,000 Units by mouth   LORazepam (ATIVAN) 0 5 mg tablet   No No   Sig: Take 2 tabs by mouth at [facility] and 1 every 6 hours as needed for anxiety   albuterol (PROAIR HFA) 90 mcg/act inhaler   No No   Sig: Inhale 1 puff every 4 (four) hours as needed for wheezing   ascorbic acid (VITAMIN C) 1000 MG tablet   Yes No   Sig: Take 1,000 mg by mouth   furosemide (LASIX) 20 mg tablet   No No   Sig: TAKE TWO TABLETS BY MOUTH TWICE DAILY   metFORMIN (GLUCOPHAGE-XR) 500 mg 24 hr tablet   No No   Sig: TAKE 2 TABLETS BY MOUTH TWICE DAILY WITH  MEALS   metoprolol succinate (TOPROL-XL) 50 mg 24 hr tablet   No No   Sig: Take 1 tablet (50 mg total) by mouth daily   nystatin (MYCOSTATIN) powder   Yes No   Sig: Apply topically   omeprazole (PriLOSEC) 40 MG capsule   Yes No   Sig: Take 40 mg by mouth   potassium chloride (K-DUR) 10 mEq tablet   No No   Sig: TAKE ONE TABLET BY MOUTH TWICE DAILY predniSONE 10 mg tablet   No No   Si tabs x 3 days, 3 tabs x 3 days 2 tabs x 3 days 1 tab x 3 days, 1/2 tab x 3 days then stop   Patient not taking: Reported on 2018    rOPINIRole (REQUIP) 2 mg tablet   No No   Sig: Take 1 tablet (2 mg total) by mouth daily at bedtime   traMADol (ULTRAM) 50 mg tablet   No No   Sig: Take 2 tablets in am and 2 tablets in pm   traZODone (DESYREL) 150 mg tablet   No No   Sig: TAKE 1 TABLET BY MOUTH ONCE DAILY AT BEDTIME      Facility-Administered Medications: None       Past Medical History:   Diagnosis Date    Anemia     Cervical radiculopathy     [facility] failure) [facility])     Chronic low back pain     [facility] 2018    Community acquired pneumonia     Diabetes mellitus ([facility] )     Diabetes mellitus with hyperglycemia ([facility])     Elevated liver enzymes     GERD (gastroesophageal reflux disease)     Paresthesia of upper extremity     Plantar fasciitis     Restless leg     Sexual dysfunction     Sleep apnea, obstructive     Tenosynovitis of finger     Tinea corporis     Weakness of upper extremity        Past Surgical History:   Procedure Laterality Date    ABDOMINAL SURGERY      CARPAL TUNNEL RELEASE Bilateral     CHOLECYSTECTOMY      laparoscopic    GASTRIC BYPASS      for morbid obesity with gilda en y   [id number] [place] Left 2017    Procedure: LONG FINGER GANGLION CYST EXCISION;  Surgeon: Rachel Bettencourt MD;  [facility];  Service: Orthopedics    NM INCISE FINGER TENDON SHEATH Left 2017    Procedure: Jeffrey Kiser, RING, TRIGGER FINGER RELEASE;  Surgeon: Rachel Bettencourt MD;  [facility];  Service: Orthopedics    SHOULDER SURGERY         Family History   Problem Relation Age of Onset    Alzheimer's disease Mother     Atrial fibrillation Mother     Dementia Mother     Heart disease Mother         heart problem    Seizures Mother     Parkinsonism Father     Arthritis Father     Atrial fibrillation Father     Substance Abuse Neg Hx         mother,father    Mental illness Neg Hx         mother,father     I have reviewed and agree with the history as documented  Social History   Substance Use Topics    Smoking status: Current Every Day Smoker     Packs/day: 0 25     Years: 9 00     Types: Cigarettes    Smokeless tobacco: Never Used    Alcohol use Yes      Comment: 2/night        Review of Systems   Constitutional: Negative  HENT: Negative  Eyes: Negative  Respiratory: Positive for shortness of breath  Cardiovascular: Negative  Gastrointestinal: Negative  Endocrine: Negative  Genitourinary: Negative  Musculoskeletal: Negative  Skin: Negative  Allergic/Immunologic: Negative  Neurological: Negative  Hematological: Negative  Psychiatric/Behavioral: Negative  Physical Exam  Physical Exam   Constitutional: She is oriented to person, place, and time  She appears well-developed and well-nourished  No distress  HENT:   Head: Normocephalic and atraumatic  Right Ear: External ear normal    Eyes: Pupils are equal, round, and reactive to light  EOM are normal    Neck: Normal range of motion  Neck supple  Cardiovascular: Normal heart sounds  Pitting edema B/L LE   per EKG    Pulmonary/Chest: Effort normal  No respiratory distress  She has wheezes  Faint I/E wheezes    Abdominal: Soft  Bowel sounds are normal    Musculoskeletal: Normal range of motion  Neurological: She is alert and oriented to person, place, and time  Skin: Skin is warm and dry  Capillary refill takes less than 2 seconds  She is not diaphoretic  Psychiatric: She has a normal mood and affect  Her behavior is normal  Judgment and thought content normal    Nursing note and vitals reviewed        Vital Signs  ED Triage Vitals   Temperature Pulse Respirations Blood Pressure SpO2   11/30/18 1417 11/30/18 1412 11/30/18 1412 11/30/18 1412 11/30/18 1412   97 5 °F (36 4 °C) (!) 138 22 148/91 93 %      Temp Source Heart Rate Source Patient Position - Orthostatic VS BP Location FiO2 (%)   11/30/18 1417 11/30/18 1417 11/30/18 1412 11/30/18 1412 --   Tympanic Monitor Lying Right arm       Pain Score       11/30/18 1412       No Pain           Vitals:    11/30/18 1645 11/30/18 1700 11/30/18 1715 11/30/18 1730   BP: 116/56 124/65 131/61 116/67   Pulse: 94 (!) 115 (!) 114 94   Patient Position - Orthostatic VS: Lying Lying Lying Lying       Visual Acuity      ED Medications  Medications   ipratropium-albuterol (DUO-NEB) 0 5-2 5 mg/3 mL inhalation solution 3 mL (3 mL Nebulization Given 11/30/18 1504)   iohexol (OMNIPAQUE) 350 MG/ML injection (MULTI-DOSE) 100 mL (100 mL Intravenous Given 11/30/18 1601)   sodium chloride 0 9 % bolus 250 mL (0 mL Intravenous Stopped 11/30/18 1730)   diltiazem (CARDIZEM) injection 20 mg (20 mg Intravenous Given 11/30/18 1635)       Diagnostic Studies  Results Reviewed     Procedure Component Value Units Date/Time    B-type natriuretic peptide [[id number]]  (Abnormal) Collected:  11/30/18 1433    Lab Status:  Final result Specimen:  Blood from Arm, Left Updated:  11/30/18 1529     NT-proBNP 692 (H) pg/mL     TSH [[id number]]  (Normal) Collected:  11/30/18 1433    Lab Status:  Final result Specimen:  Blood from Arm, Left Updated:  11/30/18 1529     TSH 3RD GENERATON 3 257 uIU/mL     Narrative:         Patients undergoing fluorescein dye angiography may retain small amounts of fluorescein in the body for 48-72 hours post procedure  Samples containing fluorescein can produce falsely depressed TSH values  If the patient had this procedure,a specimen should be resubmitted post fluorescein clearance            The recommended reference ranges for TSH during pregnancy are as follows:  First trimester 0 1 to 2 5 uIU/mL  Second trimester  0 2 to 3 0 uIU/mL  Third trimester 0 3 to 3 0 uIU/m      Comprehensive metabolic panel [[id number]]  (Abnormal) Collected:  11/30/18 1433    Lab Status:  Final result Specimen:  Blood from Arm, Left Updated:  11/30/18 1529     Sodium 136 mmol/L      Potassium 3 7 mmol/L      Chloride 94 (L) mmol/L      CO2 36 (H) mmol/L      ANION GAP 6 mmol/L      BUN 7 mg/dL      Creatinine 0 73 mg/dL      Glucose 187 (H) mg/dL      Calcium 8 5 mg/dL      AST 32 U/L      ALT 59 U/L      Alkaline Phosphatase 138 (H) U/L      Total Protein 6 5 g/dL      Albumin 3 4 (L) g/dL      Total Bilirubin 0 40 mg/dL      eGFR 90 ml/min/1 73sq m     Narrative:         National Kidney Disease Education Program recommendations are as follows:  GFR calculation is accurate only with a steady state creatinine  Chronic Kidney disease less than 60 ml/min/1 73 sq  meters  Kidney failure less than 15 ml/min/1 73 sq  meters      Magnesium [[id number]]  (Normal) Collected:  11/30/18 1433    Lab Status:  Final result Specimen:  Blood from Arm, Left Updated:  11/30/18 1529     Magnesium 1 8 mg/dL     Troponin I [[id number]]  (Normal) Collected:  11/30/18 1432    Lab Status:  Final result Specimen:  Blood from Arm, Left Updated:  11/30/18 1518     Troponin I <0 02 ng/mL     Protime-INR [[id number]]  (Abnormal) Collected:  11/30/18 1433    Lab Status:  Final result Specimen:  Blood from Arm, Left Updated:  11/30/18 1517     Protime 14 7 (H) seconds      INR 1 22 (H)    APTT [[id number]]  (Normal) Collected:  11/30/18 1433    Lab Status:  Final result Specimen:  Blood from Arm, Left Updated:  11/30/18 1517     PTT 29 seconds     CBC and differential [[id number]]  (Abnormal) Collected:  11/30/18 1432    Lab Status:  Final result Specimen:  Blood from Arm, Left Updated:  11/30/18 1459     WBC 9 88 Thousand/uL      RBC 5 22 (H) Million/uL      Hemoglobin 10 8 (L) g/dL      Hematocrit 36 5 %      MCV 70 (L) fL      MCH 20 7 (L) pg      MCHC 29 6 (L) g/dL      RDW 19 8 (H) %      MPV 10 3 fL      Platelets 748 Thousands/uL      nRBC 0 /100 WBCs      Neutrophils Relative 71 %      Immat GRANS % 1 %      Lymphocytes Relative 17 %      Monocytes Relative 8 %      Eosinophils Relative 2 %      Basophils Relative 1 %      Neutrophils Absolute 7 08 Thousands/µL      Immature Grans Absolute 0 06 Thousand/uL      Lymphocytes Absolute 1 64 Thousands/µL      Monocytes Absolute 0 83 Thousand/µL      Eosinophils Absolute 0 21 Thousand/µL      Basophils Absolute 0 06 Thousands/µL     POCT urinalysis dipstick [[id number]]     Lab Status:  No result Specimen:  Urine                  PE Study with CT Abdomen and Pelvis with contrast   Final Result by Joce Fields MD (11/30 1637)      1  No acute pulmonary embolism  Severe pulmonary artery enlargement consistent with pulmonary hypertension  2   Prominent mosaic attenuation of the lung fields, increased from the prior CT  Differential includes respiratory bronchiolitis, hypersensitivity pneumonitis, and chronic thromboembolic disease, particularly given the enlarged pulmonary artery  3   No acute inflammatory process in the abdomen or pelvis  4   Soft tissue thickening at the hepatic flexure which may be due to peristalsis  This appears more prominent than expected, however, and the possibility of underlying colonic neoplasm is not excluded  Further evaluation with colonoscopy is    recommended if this has not been recently performed  5   Enlarged heterogeneously enhancing liver  While this may be a normal variant, correlation with liver function testing recommended  If liver function tests are abnormal, consider follow-up nonemergent ultrasound evaluation            I personally discussed this study with Norberto Kaiser on 11/30/2018 at 4:27 PM                            Workstation performed: [id number]                    Procedures  ECG 12 Lead Documentation  Date/Time: 11/30/2018 2:49 PM  Performed by: Sarahi Gamble  Authorized by: Sarahi Gamble     Indications / Diagnosis:  SOB ECG reviewed by me, the ED Provider: yes (Dr Foster Myers )    Patient location:  ED  Previous ECG:     Previous ECG:  Unavailable    Comparison to cardiac monitor: Yes    Interpretation:     Interpretation: abnormal    Rate:     ECG rate:  137    ECG rate assessment: tachycardic    Rhythm:     Rhythm: sinus tachycardia             Phone Contacts  ED Phone Contact    ED Course  ED Course as of Nov 30 1802 Fri Nov 30, 2018   1510 EKG ? Aflutter vs ST        1710 EKG #2 Aflutter  Cardizem 20 mg bolus given and HR now 92  Labs reviewed and discussed with pt  CT negative for PE      1711 IMPRESSION:     1  No acute pulmonary embolism  Severe pulmonary artery enlargement consistent with pulmonary hypertension      2  Prominent mosaic attenuation of the lung fields, increased from the prior CT  Differential includes respiratory bronchiolitis, hypersensitivity pneumonitis, and chronic thromboembolic disease, particularly given the enlarged pulmonary artery      3  No acute inflammatory process in the abdomen or pelvis      4  Soft tissue thickening at the hepatic flexure which may be due to peristalsis  This appears more prominent than expected, however, and the possibility of underlying colonic neoplasm is not excluded  Further evaluation with colonoscopy is   recommended if this has not been recently performed      5   Enlarged heterogeneously enhancing liver  While this may be a normal variant, correlation with liver function testing recommended  If liver function tests are abnormal, consider follow-up nonemergent ultrasound evaluation       1714 Page to [facility] for admission  3560 [facility] with [facility] will admit                                    MDM  Number of Diagnoses or Management Options  Diagnosis management comments: Differential diagnosis  CHF  COPD exacerbation  A flutter vs Afib (see EKG    Plan  Labs  EKG  Tele  CXR  duoneb    Discussed case with Dr Foster Myers        Amount and/or Complexity of Data Reviewed  Clinical lab tests: ordered and reviewed  Tests in the radiology section of CPT®: ordered and reviewed  Review and summarize past medical records: yes  Discuss the patient with other providers: yes (Dr Ministerio De Jesus  )      CritCare Time    Disposition  Final diagnoses:   Atrial fibrillation, unspecified type ([place] [address] )   Chronic obstructive asthma ([facility] )   Tobacco use   Diabetes mellitus ([place] [address] )     Time reflects when diagnosis was documented in both MDM as applicable and the Disposition within this note     Time User Action Codes Description Comment    11/30/2018  5:12 PM Sola Homans Add [I48 91] Atrial fibrillation, unspecified type ([place] [address] )     11/30/2018  5:12 PM Sola Homans Add [J44 9] Chronic obstructive asthma ([place] [address] )     11/30/2018  5:12 PM Sola Homans Add [Z72 0] Tobacco use     11/30/2018  5:12 PM Sola Homans Add [E11 9] Diabetes mellitus [facility])       ED Disposition     ED Disposition Condition Comment    Admit  Case was discussed with [facility]  and the patient's admission status was agreed to be Admission Status: inpatient status to the service of Dr Sukhjinder Lord    Follow-up Information    None         Patient's Medications   Discharge Prescriptions    No medications on file     No discharge procedures on file      ED Provider  Electronically Signed by           VICKY Gonzalez  11/30/18 2867

## 2018-11-30 NOTE — PLAN OF CARE
Problem: Potential for Falls  Goal: Patient will remain free of falls  INTERVENTIONS:  - Assess patient frequently for physical needs  -  Identify cognitive and physical deficits and behaviors that affect risk of falls  -  [place] fall precautions as indicated by assessment   - Educate patient/family on patient safety including physical limitations  - Instruct patient to call for assistance with activity based on assessment  - Modify environment to reduce risk of injury  - Consider OT/PT consult to assist with strengthening/mobility   Outcome: Progressing      Problem: CARDIOVASCULAR - ADULT  Goal: Maintains optimal cardiac output and hemodynamic stability  INTERVENTIONS:  - Monitor I/O, vital signs and rhythm  - Monitor for S/S and trends of decreased cardiac output i e  bleeding, hypotension  - Administer and titrate ordered vasoactive medications to optimize hemodynamic stability  - Assess quality of pulses, skin color and temperature  - Assess for signs of decreased coronary artery perfusion - ex   Angina  - Instruct patient to report change in severity of symptoms  Outcome: Progressing    Goal: Absence of cardiac dysrhythmias or at baseline rhythm  INTERVENTIONS:  - Continuous cardiac monitoring, monitor vital signs, obtain 12 lead EKG if indicated  - Administer antiarrhythmic and heart rate control medications as ordered  - Monitor electrolytes and administer replacement therapy as ordered  Outcome: Progressing      Problem: RESPIRATORY - ADULT  Goal: Achieves optimal ventilation and oxygenation  INTERVENTIONS:  - Assess for changes in respiratory status  - Assess for changes in mentation and behavior  - Position to facilitate oxygenation and minimize respiratory effort  - Oxygen administration by appropriate delivery method based on oxygen saturation (per order) or ABGs  - Initiate smoking cessation education as indicated  - Encourage broncho-pulmonary hygiene including cough, deep breathe, Incentive Spirometry  - Assess the need for suctioning and aspirate as needed  - Assess and instruct to report SOB or any respiratory difficulty  - Respiratory Therapy support as indicated  Outcome: Progressing      Problem: METABOLIC, FLUID AND ELECTROLYTES - ADULT  Goal: Glucose maintained within target range  INTERVENTIONS:  - Monitor Blood Glucose as ordered  - Assess for signs and symptoms of hyperglycemia and hypoglycemia  - Administer ordered medications to maintain glucose within target range  - Assess nutritional intake and initiate nutrition service referral as needed  Outcome: Progressing      Problem: SKIN/TISSUE INTEGRITY - ADULT  Goal: Skin integrity remains intact  INTERVENTIONS  - Identify patients at risk for skin breakdown  - Assess and monitor skin integrity  - Assess and monitor nutrition and hydration status  - Monitor labs (i e  albumin)  - Assess for incontinence   - Turn and reposition patient  - Assist with mobility/ambulation  - Relieve pressure over bony prominences  - Avoid friction and shearing  - Provide appropriate hygiene as needed including keeping skin clean and dry  - Evaluate need for skin moisturizer/barrier cream  - Collaborate with interdisciplinary team (i e  Nutrition, Rehabilitation, etc )   - Patient/family teaching  Outcome: Progressing      Problem: MUSCULOSKELETAL - ADULT  Goal: Maintain or return mobility to safest level of function  INTERVENTIONS:  - Assess patient's ability to carry out ADLs; assess patient's baseline for ADL function and identify physical deficits which impact ability to perform ADLs (bathing, care of mouth/teeth, toileting, grooming, dressing, etc )  - Assess/evaluate cause of self-care deficits   - Assess range of motion  - Assess patient's mobility; develop plan if impaired  - Assess patient's need for assistive devices and provide as appropriate  - Encourage maximum independence but intervene and supervise when necessary  - Involve family in performance of ADLs  - Assess for home care needs following discharge   - Request OT consult to assist with ADL evaluation and planning for discharge  - Provide patient education as appropriate  Outcome: Progressing      Problem: Nutrition/Hydration-ADULT  Goal: Nutrient/Hydration intake appropriate for improving, restoring or maintaining nutritional needs  Monitor and assess patient's nutrition/hydration status for malnutrition (ex- brittle hair, bruises, dry skin, pale skin and conjunctiva, muscle wasting, smooth red tongue, and disorientation)  Collaborate with interdisciplinary team and initiate plan and interventions as ordered  Monitor patient's weight and dietary intake as ordered or per policy  Utilize nutrition screening tool and intervene per policy  Determine patient's food preferences and provide high-protein, high-caloric foods as appropriate       INTERVENTIONS:  - Monitor oral intake, urinary output, labs, and treatment plans  - Assess nutrition and hydration status and recommend course of action  - Evaluate amount of meals eaten  - Assist patient with eating if necessary   - Allow adequate time for meals  - Recommend/ encourage appropriate diets, oral nutritional supplements, and vitamin/mineral supplements  - Order, calculate, and assess calorie counts as needed  - Recommend, monitor, and adjust tube feedings and TPN/PPN based on assessed needs  - Assess need for intravenous fluids  - Provide specific nutrition/hydration education as appropriate  - Include patient/family/caregiver in decisions related to nutrition  Outcome: Progressing      Problem: PAIN - ADULT  Goal: Verbalizes/displays adequate comfort level or baseline comfort level  Interventions:  - Encourage patient to monitor pain and request assistance  - Assess pain using appropriate pain scale  - Administer analgesics based on type and severity of pain and evaluate response  - Implement non-pharmacological measures as appropriate and evaluate response  - Consider cultural and social influences on pain and pain management  - Notify physician/advanced practitioner if interventions unsuccessful or patient reports new pain  Outcome: Progressing      Problem: SAFETY ADULT  Goal: Patient will remain free of falls  INTERVENTIONS:  - Assess patient frequently for physical needs  -  Identify cognitive and physical deficits and behaviors that affect risk of falls  -  [place] fall precautions as indicated by assessment   - Educate patient/family on patient safety including physical limitations  - Instruct patient to call for assistance with activity based on assessment  - Modify environment to reduce risk of injury  - Consider OT/PT consult to assist with strengthening/mobility   Outcome: Progressing      Problem: DISCHARGE PLANNING  Goal: Discharge to home or other facility with appropriate resources  INTERVENTIONS:  - Identify barriers to discharge w/patient and caregiver  - Arrange for needed discharge resources and transportation as appropriate  - Identify discharge learning needs (meds, wound care, etc )  - Arrange for interpretive services to assist at discharge as needed  - Refer to Case Management Department for coordinating discharge planning if the patient needs post-hospital services based on physician/advanced practitioner order or complex needs related to functional status, cognitive ability, or social support system  Outcome: Progressing      Problem: Knowledge Deficit  Goal: Patient/family/caregiver demonstrates understanding of disease process, treatment plan, medications, and discharge instructions  Complete learning assessment and assess knowledge base    Interventions:  - Provide teaching at level of understanding  - Provide teaching via preferred learning methods  Outcome: Progressing

## 2018-12-01 ENCOUNTER — APPOINTMENT (INPATIENT)
Dept: NON INVASIVE DIAGNOSTICS | Facility: HOSPITAL | Age: 58
DRG: 194 | End: 2018-12-01
Payer: COMMERCIAL

## 2018-12-01 PROBLEM — R93.5 ABNORMAL CT OF THE ABDOMEN: Status: ACTIVE | Noted: 2018-11-30

## 2018-12-01 PROBLEM — D50.0 IRON DEFICIENCY ANEMIA DUE TO CHRONIC BLOOD LOSS: Status: ACTIVE | Noted: 2018-11-30

## 2018-12-01 LAB
ALBUMIN SERPL BCP-MCNC: 3.2 G/DL (ref 3.5–5)
ALP SERPL-CCNC: 130 U/L (ref 46–116)
ALT SERPL W P-5'-P-CCNC: 51 U/L (ref 12–78)
ANION GAP SERPL CALCULATED.3IONS-SCNC: 7 MMOL/L (ref 4–13)
ANISOCYTOSIS BLD QL SMEAR: PRESENT
AST SERPL W P-5'-P-CCNC: 23 U/L (ref 5–45)
BASOPHILS # BLD MANUAL: 0 THOUSAND/UL (ref 0–0.1)
BASOPHILS NFR MAR MANUAL: 0 % (ref 0–1)
BILIRUB DIRECT SERPL-MCNC: 0.22 MG/DL (ref 0–0.2)
BILIRUB SERPL-MCNC: 0.4 MG/DL (ref 0.2–1)
BUN SERPL-MCNC: 8 MG/DL (ref 5–25)
CALCIUM SERPL-MCNC: 8.1 MG/DL (ref 8.3–10.1)
CHLORIDE SERPL-SCNC: 93 MMOL/L (ref 100–108)
CHOLEST SERPL-MCNC: 137 MG/DL (ref 50–200)
CO2 SERPL-SCNC: 34 MMOL/L (ref 21–32)
CREAT SERPL-MCNC: 0.74 MG/DL (ref 0.6–1.3)
EOSINOPHIL # BLD MANUAL: 0 THOUSAND/UL (ref 0–0.4)
EOSINOPHIL NFR BLD MANUAL: 0 % (ref 0–6)
ERYTHROCYTE [DISTWIDTH] IN BLOOD BY AUTOMATED COUNT: 19.5 % (ref 11.6–15.1)
GFR SERPL CREATININE-BSD FRML MDRD: 89 ML/MIN/1.73SQ M
GLUCOSE SERPL-MCNC: 252 MG/DL (ref 65–140)
GLUCOSE SERPL-MCNC: 298 MG/DL (ref 65–140)
GLUCOSE SERPL-MCNC: 309 MG/DL (ref 65–140)
GLUCOSE SERPL-MCNC: 322 MG/DL (ref 65–140)
GLUCOSE SERPL-MCNC: 364 MG/DL (ref 65–140)
HCT VFR BLD AUTO: 34.9 % (ref 34.8–46.1)
HDLC SERPL-MCNC: 65 MG/DL (ref 40–60)
HGB BLD-MCNC: 10.3 G/DL (ref 11.5–15.4)
HYPERCHROMIA BLD QL SMEAR: PRESENT
INR PPP: 1.27 (ref 0.86–1.17)
LDLC SERPL CALC-MCNC: 61 MG/DL (ref 0–100)
LYMPHOCYTES # BLD AUTO: 0.38 THOUSAND/UL (ref 0.6–4.47)
LYMPHOCYTES # BLD AUTO: 5 % (ref 14–44)
MCH RBC QN AUTO: 20.5 PG (ref 26.8–34.3)
MCHC RBC AUTO-ENTMCNC: 29.5 G/DL (ref 31.4–37.4)
MCV RBC AUTO: 70 FL (ref 82–98)
MICROCYTES BLD QL AUTO: PRESENT
MONOCYTES # BLD AUTO: 0.08 THOUSAND/UL (ref 0–1.22)
MONOCYTES NFR BLD: 1 % (ref 4–12)
NEUTROPHILS # BLD MANUAL: 7.11 THOUSAND/UL (ref 1.85–7.62)
NEUTS SEG NFR BLD AUTO: 94 % (ref 43–75)
NRBC BLD AUTO-RTO: 0 /100 WBCS
OVALOCYTES BLD QL SMEAR: PRESENT
PLATELET # BLD AUTO: 331 THOUSANDS/UL (ref 149–390)
PLATELET BLD QL SMEAR: ADEQUATE
PMV BLD AUTO: 10.6 FL (ref 8.9–12.7)
POLYCHROMASIA BLD QL SMEAR: PRESENT
POTASSIUM SERPL-SCNC: 4.4 MMOL/L (ref 3.5–5.3)
PROT SERPL-MCNC: 6.4 G/DL (ref 6.4–8.2)
PROTHROMBIN TIME: 15.2 SECONDS (ref 11.8–14.2)
RBC # BLD AUTO: 5.02 MILLION/UL (ref 3.81–5.12)
RBC MORPH BLD: PRESENT
SODIUM SERPL-SCNC: 134 MMOL/L (ref 136–145)
TARGETS BLD QL SMEAR: PRESENT
TOTAL CELLS COUNTED SPEC: 100
TRIGL SERPL-MCNC: 54 MG/DL
TROPONIN I SERPL-MCNC: <0.02 NG/ML
WBC # BLD AUTO: 7.56 THOUSAND/UL (ref 4.31–10.16)

## 2018-12-01 PROCEDURE — 94664 DEMO&/EVAL PT USE INHALER: CPT

## 2018-12-01 PROCEDURE — 93306 TTE W/DOPPLER COMPLETE: CPT

## 2018-12-01 PROCEDURE — 93005 ELECTROCARDIOGRAM TRACING: CPT

## 2018-12-01 PROCEDURE — 99254 IP/OBS CNSLTJ NEW/EST MOD 60: CPT | Performed by: INTERNAL MEDICINE

## 2018-12-01 PROCEDURE — 94760 N-INVAS EAR/PLS OXIMETRY 1: CPT

## 2018-12-01 PROCEDURE — 85007 BL SMEAR W/DIFF WBC COUNT: CPT | Performed by: NURSE PRACTITIONER

## 2018-12-01 PROCEDURE — 93306 TTE W/DOPPLER COMPLETE: CPT | Performed by: INTERNAL MEDICINE

## 2018-12-01 PROCEDURE — 82728 ASSAY OF FERRITIN: CPT | Performed by: INTERNAL MEDICINE

## 2018-12-01 PROCEDURE — 85610 PROTHROMBIN TIME: CPT | Performed by: NURSE PRACTITIONER

## 2018-12-01 PROCEDURE — 99233 SBSQ HOSP IP/OBS HIGH 50: CPT | Performed by: INTERNAL MEDICINE

## 2018-12-01 PROCEDURE — 80048 BASIC METABOLIC PNL TOTAL CA: CPT | Performed by: NURSE PRACTITIONER

## 2018-12-01 PROCEDURE — 83550 IRON BINDING TEST: CPT | Performed by: INTERNAL MEDICINE

## 2018-12-01 PROCEDURE — 80061 LIPID PANEL: CPT | Performed by: NURSE PRACTITIONER

## 2018-12-01 PROCEDURE — 82948 REAGENT STRIP/BLOOD GLUCOSE: CPT

## 2018-12-01 PROCEDURE — 80076 HEPATIC FUNCTION PANEL: CPT | Performed by: NURSE PRACTITIONER

## 2018-12-01 PROCEDURE — 85027 COMPLETE CBC AUTOMATED: CPT | Performed by: NURSE PRACTITIONER

## 2018-12-01 PROCEDURE — 94660 CPAP INITIATION&MGMT: CPT

## 2018-12-01 PROCEDURE — 84484 ASSAY OF TROPONIN QUANT: CPT | Performed by: NURSE PRACTITIONER

## 2018-12-01 PROCEDURE — 94640 AIRWAY INHALATION TREATMENT: CPT

## 2018-12-01 PROCEDURE — 83540 ASSAY OF IRON: CPT | Performed by: INTERNAL MEDICINE

## 2018-12-01 RX ORDER — DILTIAZEM HYDROCHLORIDE 60 MG/1
60 TABLET, FILM COATED ORAL EVERY 8 HOURS SCHEDULED
Status: DISCONTINUED | OUTPATIENT
Start: 2018-12-01 | End: 2018-12-04

## 2018-12-01 RX ORDER — METHYLPREDNISOLONE SODIUM SUCCINATE 40 MG/ML
40 INJECTION, POWDER, LYOPHILIZED, FOR SOLUTION INTRAMUSCULAR; INTRAVENOUS EVERY 12 HOURS SCHEDULED
Status: DISCONTINUED | OUTPATIENT
Start: 2018-12-01 | End: 2018-12-01

## 2018-12-01 RX ORDER — PREDNISONE 20 MG/1
40 TABLET ORAL DAILY
Status: DISCONTINUED | OUTPATIENT
Start: 2018-12-02 | End: 2018-12-04

## 2018-12-01 RX ORDER — TRAMADOL HYDROCHLORIDE 50 MG/1
100 TABLET ORAL EVERY 12 HOURS
Status: DISCONTINUED | OUTPATIENT
Start: 2018-12-01 | End: 2018-12-07 | Stop reason: HOSPADM

## 2018-12-01 RX ORDER — FUROSEMIDE 10 MG/ML
40 INJECTION INTRAMUSCULAR; INTRAVENOUS ONCE
Status: COMPLETED | OUTPATIENT
Start: 2018-12-01 | End: 2018-12-01

## 2018-12-01 RX ORDER — METHYLPREDNISOLONE SODIUM SUCCINATE 40 MG/ML
40 INJECTION, POWDER, LYOPHILIZED, FOR SOLUTION INTRAMUSCULAR; INTRAVENOUS EVERY 12 HOURS SCHEDULED
Status: COMPLETED | OUTPATIENT
Start: 2018-12-01 | End: 2018-12-01

## 2018-12-01 RX ORDER — BUDESONIDE 0.5 MG/2ML
0.5 INHALANT ORAL
Status: DISCONTINUED | OUTPATIENT
Start: 2018-12-01 | End: 2018-12-03

## 2018-12-01 RX ADMIN — OXYCODONE HYDROCHLORIDE AND ACETAMINOPHEN 1000 MG: 500 TABLET ORAL at 08:19

## 2018-12-01 RX ADMIN — DILTIAZEM HYDROCHLORIDE 15 MG/HR: 5 INJECTION INTRAVENOUS at 16:00

## 2018-12-01 RX ADMIN — LEVALBUTEROL HYDROCHLORIDE 0.63 MG: 0.63 SOLUTION RESPIRATORY (INHALATION) at 09:07

## 2018-12-01 RX ADMIN — ENOXAPARIN SODIUM 120 MG: 150 INJECTION SUBCUTANEOUS at 22:59

## 2018-12-01 RX ADMIN — ENOXAPARIN SODIUM 120 MG: 150 INJECTION SUBCUTANEOUS at 11:50

## 2018-12-01 RX ADMIN — LEVALBUTEROL HYDROCHLORIDE 0.63 MG: 0.63 SOLUTION RESPIRATORY (INHALATION) at 19:54

## 2018-12-01 RX ADMIN — METHYLPREDNISOLONE SODIUM SUCCINATE 40 MG: 40 INJECTION, POWDER, FOR SOLUTION INTRAMUSCULAR; INTRAVENOUS at 20:44

## 2018-12-01 RX ADMIN — METOPROLOL SUCCINATE 50 MG: 50 TABLET, EXTENDED RELEASE ORAL at 08:19

## 2018-12-01 RX ADMIN — POTASSIUM CHLORIDE 10 MEQ: 750 TABLET, EXTENDED RELEASE ORAL at 08:19

## 2018-12-01 RX ADMIN — INSULIN LISPRO 3 UNITS: 100 INJECTION, SOLUTION INTRAVENOUS; SUBCUTANEOUS at 08:31

## 2018-12-01 RX ADMIN — DILTIAZEM HYDROCHLORIDE 10 MG/HR: 5 INJECTION INTRAVENOUS at 08:05

## 2018-12-01 RX ADMIN — METHYLPREDNISOLONE SODIUM SUCCINATE 40 MG: 40 INJECTION, POWDER, FOR SOLUTION INTRAMUSCULAR; INTRAVENOUS at 05:22

## 2018-12-01 RX ADMIN — PANTOPRAZOLE SODIUM 40 MG: 40 TABLET, DELAYED RELEASE ORAL at 08:19

## 2018-12-01 RX ADMIN — TRAMADOL HYDROCHLORIDE 100 MG: 50 TABLET, COATED ORAL at 09:12

## 2018-12-01 RX ADMIN — METOPROLOL TARTRATE 5 MG: 1 INJECTION, SOLUTION INTRAVENOUS at 11:57

## 2018-12-01 RX ADMIN — BUDESONIDE 0.5 MG: 0.5 INHALANT RESPIRATORY (INHALATION) at 19:54

## 2018-12-01 RX ADMIN — VITAMIN D, TAB 1000IU (100/BT) 1000 UNITS: 25 TAB at 08:19

## 2018-12-01 RX ADMIN — FUROSEMIDE 40 MG: 10 INJECTION, SOLUTION INTRAVENOUS at 16:34

## 2018-12-01 RX ADMIN — FUROSEMIDE 40 MG: 10 INJECTION, SOLUTION INTRAVENOUS at 08:19

## 2018-12-01 RX ADMIN — DILTIAZEM HYDROCHLORIDE 60 MG: 60 TABLET, FILM COATED ORAL at 20:47

## 2018-12-01 RX ADMIN — ROPINIROLE 2 MG: 2 TABLET, FILM COATED ORAL at 22:59

## 2018-12-01 RX ADMIN — TRAZODONE HYDROCHLORIDE 150 MG: 150 TABLET ORAL at 22:59

## 2018-12-01 RX ADMIN — INSULIN LISPRO 10 UNITS: 100 INJECTION, SOLUTION INTRAVENOUS; SUBCUTANEOUS at 16:35

## 2018-12-01 RX ADMIN — BISACODYL 10 MG: 5 TABLET, COATED ORAL at 16:32

## 2018-12-01 RX ADMIN — FUROSEMIDE 40 MG: 10 INJECTION, SOLUTION INTRAVENOUS at 20:38

## 2018-12-01 RX ADMIN — POTASSIUM CHLORIDE 10 MEQ: 750 TABLET, EXTENDED RELEASE ORAL at 16:34

## 2018-12-01 RX ADMIN — INSULIN LISPRO 8 UNITS: 100 INJECTION, SOLUTION INTRAVENOUS; SUBCUTANEOUS at 11:46

## 2018-12-01 RX ADMIN — TRAMADOL HYDROCHLORIDE 100 MG: 50 TABLET, COATED ORAL at 20:47

## 2018-12-01 NOTE — ASSESSMENT & PLAN NOTE
Patient takes Lasix 20 mg p o  B i d  at home which was prescribed by Dr Ca Vieira for CHF  No echocardiogram documented  BNP elevated at 692  Rales heard in bilateral lower lobes  Patient has moderate nonpitting edema to bilateral lower extremities  Will increase Lasix to 40 mg IV b i d   Monitor I/O and daily weights  Place patient on 1800 mL fluid restriction and 2 g sodium diet

## 2018-12-01 NOTE — ASSESSMENT & PLAN NOTE
Hemoglobin 10 8  No obvious signs of bleeding  Will continue to monitor and transfuse as needed to keep hemoglobin greater than 8

## 2018-12-01 NOTE — PROGRESS NOTES
Progress Note - Pardeep Mccall 59 y o  female MRN: [id number]  Unit/Bed#: -02 Encounter: [id number]    Assessment:  Principal Problem:    Acute respiratory failure with hypoxia ([address] )  Active Problems:    Benign essential hypertension    Obstructive sleep apnea    Iron deficiency anemia    Severe persistent asthma with acute exacerbation    Tobacco use    Type 2 diabetes mellitus, without long-term current use of insulin (HCC)    Acute congestive heart failure (HCC)    Atrial flutter (HCC)    Abnormal computed tomography angiography (CTA) of abdomen  Resolved Problems:    Sleep apnea, obstructive    Anemia      Plan:  Monitor on step-down unit  · New onset atrial flutter/fib  Continue on Cardizem drip and titrate to desired heart rate  Serial troponin echocardiogram  Continue on Lopressor  Cardiology consult  Will start anticoagulation with Coumadin and therapeutic dose of Lovenox  Order PT INR in a m  Mack Lopze · Acute respiratory failure with hypoxia  · Acute on chronic CHF likely diastolic  Oxygen supplementation and bronchodilators  Daily weight and I&Os  Continue on Lasix  · COPD with mild exacerbation  Pulmonary consult noted  On Solu-Medrol and bronchodilators  · WILMA,  Morbid obesity-encourage weight loss  Continue CPAP bedtime  · DVT prophylaxis  · Discussed with patient in detail  Subjective:   Patient is seen and examined at bedside  Patient with history of CHF, COPD presented to emergency department with cough, shortness of breath for several weeks  Patient was treated with Z-Carlos recently  Patient presented to ER with palpitation  She was found to have new onset AFib  She smokes 6-8 cigarettes per day  Her father passed away recently  She did complain of intermittent chest pain but it resolved  Continues to have shortness of breath  Afebrile  All other ROS are negative      Objective:   Vitals: Blood pressure 125/58, pulse (!) 117, temperature 98 1 °F (36 7 °C), temperature source Oral, resp  rate 18, height 5' 3" (1 6 m), weight 120 kg (263 lb 7 2 oz), SpO2 90 %  ,Body mass index is 46 67 kg/m²  SPO2 RA Rest      ED to Hosp-Admission (Current) from 11/30/2018 in [facility]   SpO2  90 %   SpO2 Activity  At Rest   O2 Device  None (Room air)   O2 Flow Rate  2 L/min        I&O:   Intake/Output Summary (Last 24 hours) at 12/01/18 1414  Last data filed at 12/01/18 1300   Gross per 24 hour   Intake              250 ml   Output              600 ml   Net             -350 ml       Physical Exam:    General- Alert, sitting comfortably in chair, Not in any acute distress  HEENT- FRANCISCO, EOM intact  Neck- Supple, No JVD  CVS- irregularly irregular S1 and S2 normal   Chest- Bilateral Air entry, decreased at bases with minimal expiratory wheezing  Abdomen- soft, nontender, not distended, no guarding or rigidity, BS+  Extremities-  trace pedal edema, No calf tenderness  CNS-   Alert, awake and orientedx3  No focal deficits present      Invasive Devices     Peripheral Intravenous Line            Peripheral IV 11/30/18 Right Wrist less than 1 day                      Social History  reviewed  Family History   Problem Relation Age of Onset    Alzheimer's disease Mother     Atrial fibrillation Mother     Dementia Mother     Heart disease Mother         heart problem    Seizures Mother     Parkinsonism Father     Arthritis Father     Atrial fibrillation Father     Substance Abuse Neg Hx         mother,father    Mental illness Neg Hx         mother,father    reviewed    Meds:  Current Facility-Administered Medications   Medication Dose Route Frequency Provider Last Rate Last Dose    acetaminophen (TYLENOL) tablet 650 mg  650 mg Oral Q4H PRN VICKY Zelaya        albuterol (PROVENTIL HFA,VENTOLIN HFA) inhaler 2 puff  2 puff Inhalation Q4H PRN VICKY Zelaya        ascorbic acid (VITAMIN C) tablet 1,000 mg  1,000 mg Oral Daily VICKY Zelaya 1,000 mg at 12/01/18 0819    budesonide (PULMICORT) inhalation solution 0 5 mg  0 5 mg Nebulization Q12H Omar Chavis MD        cholecalciferol (VITAMIN D3) tablet 1,000 Units  1,000 Units Oral Daily VICKY Edwards   1,000 Units at 12/01/18 0819    diltiazem (CARDIZEM) 125 mg in sodium chloride 0 9 % 125 mL infusion  1-15 mg/hr Intravenous Titrated VICKY Cohen 15 mL/hr at 12/01/18 0930 15 mg/hr at 12/01/18 0930    enoxaparin (LOVENOX) subcutaneous injection 120 mg  1 mg/kg Subcutaneous Q12H Adi Walker MD   120 mg at 12/01/18 1150    furosemide (LASIX) injection 40 mg  40 mg Intravenous BID VICKY Cohen   40 mg at 12/01/18 1335    guaiFENesin (ROBITUSSIN) oral solution 200 mg  200 mg Oral Q4H PRN VICKY Edwrads        influenza vaccine, recombinant, quadrivalent (FLUBLOK) IM injection 0 5 mL  0 5 mL Intramuscular Prior to discharge Eden Marmolejo MD        insulin lispro (HumaLOG) 100 units/mL subcutaneous injection 2-12 Units  2-12 Units Subcutaneous TID AC Jhonny Barrera MD   8 Units at 12/01/18 1146    levalbuterol (XOPENEX) inhalation solution 0 63 mg  0 63 mg Nebulization Q6H PRN VICKY Edwards   0 63 mg at 12/01/18 9586    methylPREDNISolone sodium succinate (Solu-MEDROL) injection 40 mg  40 mg Intravenous Q12H [address] Omar Chavis MD        metoprolol (LOPRESSOR) injection 5 mg  5 mg Intravenous Q6H PRN VICKY Edwards   5 mg at 12/01/18 1157    metoprolol succinate (TOPROL-XL) 24 hr tablet 50 mg  50 mg Oral Daily VICKY Cohen   50 mg at 12/01/18 0819    ondansetron (ZOFRAN) injection 4 mg  4 mg Intravenous Q6H PRN VICKY Cohen        pantoprazole (PROTONIX) EC tablet 40 mg  40 mg Oral Early Morning VICKY Cohen   40 mg at 12/01/18 0819    pneumococcal 13-valent conjugate vaccine (PREVNAR-13) IM injection 0 5 mL  0 5 mL Intramuscular Prior to discharge Eden Marmolejo MD        potassium chloride (K-DUR,KLOR-CON) CR tablet 10 mEq  10 mEq Oral BID Florentino AlfaroVICKY short   10 mEq at 12/01/18 0819    [START ON 12/2/2018] predniSONE tablet 40 mg  40 mg Oral Daily Riki Lagos MD        rOPINIRole (REQUIP) tablet 2 mg  2 mg Oral HS Gaye VICKY Peace   2 mg at 11/30/18 2244    traMADol (ULTRAM) tablet 100 mg  100 mg Oral Q12H Jenn Dean MD   100 mg at 12/01/18 0912    traZODone (DESYREL) tablet 150 mg  150 mg Oral HS Gaye VICKY Peace   150 mg at 11/30/18 2245    warfarin (COUMADIN) tablet 5 mg  5 mg Oral Daily (warfarin) VICKY Castro   5 mg at 11/30/18 2117      Prescriptions Prior to Admission   Medication    albuterol (PROAIR HFA) 90 mcg/act inhaler    ascorbic acid (VITAMIN C) 1000 MG tablet    Blood Glucose Monitoring Suppl (RealtimeBoard CONTOUR NEXT MONITOR) w/Device KIT    Cholecalciferol 1000 units tablet    furosemide (LASIX) 20 mg tablet    LORazepam (ATIVAN) 0 5 mg tablet    metFORMIN (GLUCOPHAGE-XR) 500 mg 24 hr tablet    metoprolol succinate (TOPROL-XL) 50 mg 24 hr tablet    nystatin (MYCOSTATIN) powder    omeprazole (PriLOSEC) 40 MG capsule    potassium chloride (K-DUR) 10 mEq tablet    predniSONE 10 mg tablet    rOPINIRole (REQUIP) 2 mg tablet    traMADol (ULTRAM) 50 mg tablet    traZODone (DESYREL) 150 mg tablet       Labs:    Results from last 7 days  Lab Units 12/01/18  0505 11/30/18  1432   WBC Thousand/uL 7 56 9 88   HEMOGLOBIN g/dL 10 3* 10 8*   HEMATOCRIT % 34 9 36 5   PLATELETS Thousands/uL 331 346   NEUTROS PCT %  --  71   LYMPHS PCT %  --  17   LYMPHO PCT % 5*  --    MONOS PCT %  --  8   MONO PCT % 1*  --    EOS PCT % 0 2       Results from last 7 days  Lab Units 12/01/18  0505 11/30/18  1433   POTASSIUM mmol/L 4 4 3 7   CHLORIDE mmol/L 93* 94*   CO2 mmol/L 34* 36*   BUN mg/dL 8 7   CREATININE mg/dL 0 74 0 73   CALCIUM mg/dL 8 1* 8 5   ALK PHOS U/L 130* 138*   ALT U/L 51 59   AST U/L 23 32     Lab Results   Component Value Date    TROPONINI <0 02 12/01/2018    TROPONINI <0 02 11/30/2018    TROPONINI <0 02 11/30/2018    CKTOTAL 39 09/07/2017       Results from last 7 days  Lab Units 12/01/18  0505 11/30/18  1433   INR  1 27* 1 22*     No results found for: Ez Kaur, SPUTUMCULTUR      Imaging:  Results for orders placed during the hospital encounter of 09/07/17   XR chest 1 view portable    Narrative CHEST     INDICATION:  weakness  History taken directly from the electronic ordering system  COMPARISON:  Chest x-ray performed on 12/19/2016    VIEWS:   AP frontal;  1 image    FINDINGS:         Mild cardiomegaly  No focal consolidation  No pleural effusion or pneumothorax  Visualized osseous structures appear within normal limits for the patient's age  Impression No active pulmonary disease  Workstation performed: [id number]       Results for orders placed during the hospital encounter of 12/19/16   XR chest pa & lateral    Narrative CHEST - DUAL ENERGY    INDICATION: Cough with shortness of breath and mid chest pain x2 months  COMPARISON: 11/10/2016, 11/18/2016  VIEWS:  PA (including soft tissue/bone algorithms) and lateral projections; 4 images    FINDINGS:    The cardiomediastinal silhouette is unremarkable  No acute infiltrates  Persistent linear opacities in the lingula consistent with atelectasis or scarring  No pleural effusions  Visualized osseous structures appear within normal limits for patient's age  Impression No acute pulmonary disease  Workstation performed: [id number]         Labs & Imaging: I have personally reviewed pertinent reports        VTE Pharmacologic Prophylaxis: Enoxaparin (Lovenox) and Warfarin (Coumadin)  VTE Mechanical Prophylaxis: sequential compression device    Code Status:   Level 1 - Full Code      "This note has been constructed using a voice recognition system"      Rivka Cummins MD  12/1/2018,2:14 PM

## 2018-12-01 NOTE — ASSESSMENT & PLAN NOTE
Patient admits to smoking 2 to 6 cigarettes per day but has not had any in the past several days  Refusing Nicoderm patch  Tobacco cessation education

## 2018-12-01 NOTE — ASSESSMENT & PLAN NOTE
Heart rate ranging 110s to 130s  Patient was given 2 diltiazem boluses of 20 mg each with little to no effect  Will add metoprolol 5 mg IV q 6 hours p r n  Heart rate greater than 110  Oswaldo Vasc score of 4  Will start patient on Coumadin 5 mg p o  Marjorie Stack Check PT INR in a m  Marjorie Stack Appreciate recommendations from Cardiology

## 2018-12-01 NOTE — UTILIZATION REVIEW
Initial Clinical Review    Admission: Date/Time/Statement: 11/30/18 @ 1718     Orders Placed This Encounter   Procedures    Inpatient Admission (expected length of stay for this patient is greater than two midnights)     Standing Status:   Standing     Number of Occurrences:   1     Order Specific Question:   Admitting Physician     Answer:   Yumiko Roque [[id number]]     Order Specific Question:   Level of Care     Answer:   Med Surg [[id number]]     Order Specific Question:   Estimated length of stay     Answer:   More than 2 Midnights     Order Specific Question:   Certification     Answer:   I certify that inpatient services are medically necessary for this patient for a duration of greater than two midnights  See H&P and MD Progress Notes for additional information about the patient's course of treatment  ED: Date/Time/Mode of Arrival:   ED Arrival Information     Expected Arrival Acuity Means of Arrival Escorted By Service Admission Type    - 11/30/2018 13:59 Emergent Wheelchair Other Hospitalist Emergency    Arrival Complaint    asthma          Chief Complaint:   Chief Complaint   Patient presents with    Asthma     Patient presents to the ER stating she was sent from dr Breann Diaz office for recurrent asthma symptoms after a course of prednisone and antibiotics  sent for cxr  History of Illness: Jennie Masters is a 59 y o  female with history of tobacco use, CHF, COPD, and diabetes who presents with complaints of shortness of breath and cough for the past several weeks  Patient was seen by PCP on 11/13/2018 for acute bronchitis with asthma exacerbation  She was treated with Z-Carlos and started on prednisone taper  Patient finished prednisone taper yesterday  She went to PCP today for follow-up and was sent to ED for evaluation  On EKG patient was noted to be tachycardic with heart rate in the 130s  She was given diltiazem 40 mg IV x1  SpO2 88% on room air  Patient was placed on O2 at 2 L   CTA of chest abdomen pelvis shows pulmonary hypertension  Prominent was a ache attenuation of lung fields with differential diagnosis including respiratory bronchiolitis, hypersensitivity pneumonitis, and chronic thromboembolic disease  Soft tissue thickening at hepatic flexure which may be due to peristalsis  Possibility of underlying colonic neoplasm  Colonoscopy recommended if not recently performed  Enlarged heterogeneously enhancing liver correlation with liver function testing recommended  If liver function abnormal consider nonemergent ultrasound  Patient admits to being under a lot of stress lately as father  2 weeks ago and patient is now caring for mother who has dementia  Patient is uninsured and therefore does not have inhalers which were prescribed to her  Patient admits to smoking 2-6 cigarettes daily but has not smoked in several days because she has no cigarettes  On exam patient is noted to have wheezing throughout with fine rales in bilateral bases  Breathing easy on O2 at 2 L via nasal cannula  Moderate nonpitting edema noted to bilateral lower extremities  Patient states that she gained approximately 30-40 lb in 1 years time  Patient denies fevers or chills  She has frequent productive cough for thick white sputum  Patient feels intermittent chest pain and palpitations    None currently          ED Vital Signs:   ED Triage Vitals   Temperature Pulse Respirations Blood Pressure SpO2   18 1417 18 1412 18 1412 18 1412 18 1412   97 5 °F (36 4 °C) (!) 138 22 148/91 93 %      Temp Source Heart Rate Source Patient Position - Orthostatic VS BP Location FiO2 (%)   18 1417 18 1417 18 1412 18 1412 --   Tympanic Monitor Lying Right arm       Pain Score       18 1412       No Pain        Wt Readings from Last 1 Encounters:   18 120 kg (263 lb 7 2 oz)       Vital Signs (abnormal):    above    Abnormal Labs/Diagnostic Test Results: BNP    692  Chloride   94  CO2    36  Alk phos   138  Albumin   3 4  H/H  10 8/36 5  Ct  Abd/pelvis:    No acute pulmonary embolism   Severe pulmonary artery enlargement consistent with pulmonary hypertension  2   Prominent mosaic attenuation of the lung fields, increased from the prior CT   Differential includes respiratory bronchiolitis, hypersensitivity pneumonitis, and chronic thromboembolic disease, particularly given the enlarged pulmonary artery  3   No acute inflammatory process in the abdomen or pelvis  4   Soft tissue thickening at the hepatic flexure which may be due to peristalsis   This appears more prominent than expected, however, and the possibility of underlying colonic neoplasm is not excluded   Further evaluation with colonoscopy is   recommended if this has not been recently performed  5   Enlarged heterogeneously enhancing liver   While this may be a normal variant, correlation with liver function testing recommended   If liver function tests are abnormal, consider follow-up nonemergent ultrasound evaluation  ED Treatment:   Medication Administration from 11/30/2018 1359 to 11/30/2018 1806       Date/Time Order Dose Route Action Action by Comments     11/30/2018 1504 ipratropium-albuterol (DUO-NEB) 0 5-2 5 mg/3 mL inhalation solution 3 mL 3 mL Nebulization Given Harrie Mortimer, RN      11/30/2018 1601 iohexol (OMNIPAQUE) 350 MG/ML injection (MULTI-DOSE) 100 mL 100 mL Intravenous Given Liz Zhou      11/30/2018 1730 sodium chloride 0 9 % bolus 250 mL 0 mL Intravenous Stopped Harrie Mortimer, RN      11/30/2018 1624 sodium chloride 0 9 % bolus 250 mL 250 mL Intravenous Nivia 37 Harrie Mortimer, RN      11/30/2018 1635 diltiazem (CARDIZEM) injection 20 mg 20 mg Intravenous Given Harrie Mortimer, RN           Past Medical/Surgical History:    Active Ambulatory Problems     Diagnosis Date Noted    Trigger thumb, left thumb 12/14/2017    Trigger middle finger of left hand 12/14/2017    Trigger ring finger of left hand 12/14/2017    Ganglion cyst of flexor tendon sheath 12/14/2017    Benign essential hypertension 03/24/2014    Diabetes mellitus with hyperglycemia ([facility] ) 06/23/2017    Esophageal reflux 06/23/2014    Obstructive sleep apnea 03/09/2014    Depression with anxiety 03/09/2014    Cervical radiculopathy 09/13/2017    Chronic low back pain 10/15/2014    Hypercholesterolemia 09/09/2014    Iron deficiency anemia 03/09/2014    Severe persistent asthma with acute exacerbation 02/20/2018    Tobacco use 02/20/2018     Resolved Ambulatory Problems     Diagnosis Date Noted    Post-viral cough syndrome 02/20/2018     Past Medical History:   Diagnosis Date    Anemia     Cervical radiculopathy     CHF (congestive heart failure) (HCC)     Chronic low back pain     Chronic obstructive asthma ([facility] ) 2/20/2018    Community acquired pneumonia     Diabetes mellitus ([facility] )     Diabetes mellitus with hyperglycemia (HCC)     Elevated liver enzymes     GERD (gastroesophageal reflux disease)     Paresthesia of upper extremity     Plantar fasciitis     Restless leg     Sexual dysfunction     Sleep apnea, obstructive     Tenosynovitis of finger     Tinea corporis     Weakness of upper extremity        Admitting Diagnosis: Diabetes mellitus ([facility] ) [E11 9]  Chronic obstructive asthma ([facility] ) [J44 9]  Asthma [J45 909]  Tobacco use [Z72 0]  Atrial fibrillation, unspecified type ([facility] ) [I48 91]    Age/Sex: 59 y o  female    Assessment/Plan:   Acute respiratory failure with hypoxia (HCC)   Assessment & Plan     SpO2 in the 80s on room air  Most likely due to new onset AFib, CHF, and asthma exacerbation  Placed on O2 at 2 L  SpO2 ranging from 94-96%  Will continue to monitor SpO2 and titrate O2 as needed to keep SpO2 greater than 92%  CTA of chest abdomen and pelvis shows prominent mosaic attenuation of lung fields    Differential diagnosis include respiratory bronchiolitis, hypersensitivity pneumonitis, and chronic thromboembolic disease  Encourage tobacco cessation  Consult pulmonology       Severe persistent asthma with acute exacerbation   Assessment & Plan     Patient was seen by PCP on 11/13/2018 and diagnosed with acute bronchitis with asthma exacerbation  She was treated with a Z-Carlos and prednisone taper  She was instructed to return to PCP if no improvement  On exam patient is noted to have expiratory wheezing throughout  Continues with frequent productive cough for thick white sputum  Will place patient on IV Solu-Medrol 40 mg q 6 hours, duo nebs q 6 hours, guaifenesin q 4 hours p r n     Albuterol p r n  Shortness of breath or wheezing  Will place inpatient consult to pulmonology       Atrial flutter [facility])   Assessment & Plan     Heart rate ranging 110s to 130s  Patient was given 2 diltiazem boluses of 20 mg each with little to no effect  Will add metoprolol 5 mg IV q 6 hours p r n  Heart rate greater than 110  Oswaldo Vasc score of 4  Will start patient on Coumadin 5 mg p o  Eliu Alcazar Check PT INR in a m  Eliu Alcazar Appreciate recommendations from Cardiology         Acute congestive heart failure [facility])   Assessment & Plan     Patient takes Lasix 20 mg p o  B i d  at home which was prescribed by Dr Santino Tirado for CHF  No echocardiogram documented  BNP elevated at 692  Rales heard in bilateral lower lobes  Patient has moderate nonpitting edema to bilateral lower extremities  Will increase Lasix to 40 mg IV b i d   Monitor I/O and daily weights  Place patient on 1800 mL fluid restriction and 2 g sodium diet         Benign essential hypertension   Assessment & Plan     BP controlled  Continue Toprol XL 50 mg p o  Daily  Monitor BP per nursing unit       Type 2 diabetes mellitus, without long-term current use of insulin (HCC)     Iron deficiency anemia   Assessment & Plan     Hemoglobin 10 8  No obvious signs of bleeding    Will continue to monitor and transfuse as needed to keep hemoglobin greater than 8        Obstructive sleep apnea   Assessment & Plan     Patient wear CPAP at night       Tobacco use   Assessment & Plan     Patient admits to smoking 2 to 6 cigarettes per day but has not had any in the past several days  Refusing Nicoderm patch  Tobacco cessation education         Anticipated Length of Stay:  Patient will be admitted on an Inpatient basis with an anticipated length of stay of  > 2 midnights     Justification for Hospital Stay: IV medications      Admission Orders:   IP  11/30  @  1718  Scheduled Meds:   Current Facility-Administered Medications:  acetaminophen 650 mg Oral Q4H PRN Terral Essex, CRNP    albuterol 2 puff Inhalation Q4H PRN VICKY Montano    ascorbic acid 1,000 mg Oral Daily VICKY Cohen    budesonide 0 5 mg Nebulization Q12H Willa Purcell MD    cholecalciferol 1,000 Units Oral Daily VICKY Cohen    diltiazem 1-15 mg/hr Intravenous Titrated Terral Essex, CRNP Last Rate: 15 mg/hr (12/01/18 0930)   enoxaparin 1 mg/kg Subcutaneous Q12H [facility] Anurag Eduardo MD    furosemide 40 mg Intravenous BID VICKY Montano    guaiFENesin 200 mg Oral Q4H PRN VICKY Montano    influenza vaccine 0 5 mL Intramuscular Prior to discharge Farzana Parisi MD    insulin lispro 2-12 Units Subcutaneous TID AC Anurag Eduardo MD    levalbuterol 0 63 mg Nebulization Q6H PRN VICKY Montano    methylPREDNISolone sodium succinate 40 mg Intravenous Q12H [facility] Willa Purcell MD    metoprolol 5 mg Intravenous Q6H PRN VICKY Montano    metoprolol succinate 50 mg Oral Daily VICKY Cohen    ondansetron 4 mg Intravenous Q6H PRN VICKY Cohen    pantoprazole 40 mg Oral Early Morning VICKY Cohen    pneumococcal 13-valent conjugate vaccine 0 5 mL Intramuscular Prior to discharge Farzana Parisi MD    potassium chloride 10 mEq Oral BID Terral Essex, CRNP    [START ON 12/2/2018] predniSONE 40 mg Oral Daily Cleo South MD    rOPINIRole 2 mg Oral HS VICKY Cohen    traMADol 100 mg Oral Q12H Irving Upton MD    traZODone 150 mg Oral HS VICKY Cohen    warfarin 5 mg Oral Daily (warfarin) VICKY Agustin      Continuous Infusions:   diltiazem 1-15 mg/hr Last Rate: 15 mg/hr (12/01/18 0930)     PRN Meds:   acetaminophen    albuterol    guaiFENesin    influenza vaccine    levalbuterol    metoprolol    ondansetron    pneumococcal 13-valent conjugate vaccine     Tele  CCD diet  O2  2L  Cons cardiology  Cons  Pulmonary  2 DE  Cons  GI    Per  Pulmonary  Consult:    1  Acute hypoxic respiratory failure with documented pulse ox 88%, most likely multifactorial including cardiac and pulmonary disease as below  2  Acute on chronic CHF, most likely diastolic and tachycardia induced with New A flutter  3  Chronic obstructive asthma with mild acute exacerbation  4  WILMA compliant with CPAP  5  Suspected pulmonary hypertension based on PA enlargement on CT scan, if true then most likely this is group 2/3  6  Abnormal CT scan with chronic mosaic hypoattenuation/patchy air trapping, seen on prior CT scan  This could be secondary to small airway disease, doubt hypersensitivity pneumonitis with no clear cause, also less likely RB-ILD that is smoking related  7  Morbid obesity  8  Tobacco abuse  Plan:   · Continue oxygen, titrate for pulse ox more than 88%  · Continue to diurese as per Cardiology and also rate controlled/anticoagulation for A flutter  · Encourage smoking cessation  · Continue Breo daily after discharge, during this admission I will start patient on Pulmicort b i d   · Decrease methyl prednisone to Q 12 hours today and switch to prednisone tomorrow then taper by 10 mg every 3 days to stop  · Continue bronchodilators nebulizer therapy  · Encourage weight loss  · Continue CPAP q h s    · Follow echocardiogram and if has severe pulmonary hypertension then further workup to be decided with RHC and/or V/Q scan and other appropriate interventions  · Encourage out of bed and ambulation      [address]  Utilization Review Department  Phone: [phone]; Fax [phone]  [email or web address]  ATTENTION: Please call with any questions or concerns to [phone]  and carefully listen to the prompts so that you are directed to the right person  Send all requests for admission clinical reviews, approved or denied determinations and any other requests to fax [phone]   All voicemails are confidential

## 2018-12-01 NOTE — ASSESSMENT & PLAN NOTE
Lab Results   Component Value Date    HGBA1C 6 8 (H) 09/22/2017       Recent Labs      11/30/18 2059   POCGLU  214*       Blood Sugar Average: Last 72 hrs:  (P) 214     Glucose 187 on chemistry  Will continue metformin 1000 mg p o  B i d   Monitor blood glucose q a c  And HS and initiate SSI

## 2018-12-01 NOTE — H&P
H&P- Myla Scott 1960, 59 y o  female MRN: [id number]    Unit/Bed#: [address] [address] Encounter: [id number]    Primary Care Provider: Monique Dickson MD   Date and time admitted to hospital: 11/30/2018  2:10 PM        * Acute respiratory failure with hypoxia ([address] )   Assessment & Plan    SpO2 in the 80s on room air  Most likely due to new onset AFib, CHF, and asthma exacerbation  Placed on O2 at 2 L  SpO2 ranging from 94-96%  Will continue to monitor SpO2 and titrate O2 as needed to keep SpO2 greater than 92%  CTA of chest abdomen and pelvis shows prominent mosaic attenuation of lung fields  Differential diagnosis include respiratory bronchiolitis, hypersensitivity pneumonitis, and chronic thromboembolic disease  Encourage tobacco cessation  Consult pulmonology  Severe persistent asthma with acute exacerbation   Assessment & Plan    Patient was seen by PCP on 11/13/2018 and diagnosed with acute bronchitis with asthma exacerbation  She was treated with a Z-Carlos and prednisone taper  She was instructed to return to PCP if no improvement  On exam patient is noted to have expiratory wheezing throughout  Continues with frequent productive cough for thick white sputum  Will place patient on IV Solu-Medrol 40 mg q 6 hours, duo nebs q 6 hours, guaifenesin q 4 hours p r n     Albuterol p r n  Shortness of breath or wheezing  Will place inpatient consult to pulmonology  Atrial flutter (HCC)   Assessment & Plan    Heart rate ranging 110s to 130s  Patient was given 2 diltiazem boluses of 20 mg each with little to no effect  Will add metoprolol 5 mg IV q 6 hours p r n  Heart rate greater than 110  Oswaldo Vasc score of 4  Will start patient on Coumadin 5 mg p o  Wes Simple Check PT INR in a m  Acworth Simple Appreciate recommendations from Cardiology  Acute congestive heart failure [facility])   Assessment & Plan    Patient takes Lasix 20 mg p o  B i d  at home which was prescribed by Dr Kelsi Royal for CHF    No echocardiogram documented  BNP elevated at 692  Rales heard in bilateral lower lobes  Patient has moderate nonpitting edema to bilateral lower extremities  Will increase Lasix to 40 mg IV b i d   Monitor I/O and daily weights  Place patient on 1800 mL fluid restriction and 2 g sodium diet  Benign essential hypertension   Assessment & Plan    BP controlled  Continue Toprol XL 50 mg p o  Daily  Monitor BP per nursing unit  Type 2 diabetes mellitus, without long-term current use of insulin [facility])   Assessment & Plan    Lab Results   Component Value Date    HGBA1C 6 8 (H) 09/22/2017       Recent Labs      11/30/18 2059   POCGLU  214*       Blood Sugar Average: Last 72 hrs:  (P) 214     Glucose 187 on chemistry  Will continue metformin 1000 mg p o  B i d   Monitor blood glucose q a c  And HS and initiate SSI  Iron deficiency anemia   Assessment & Plan    Hemoglobin 10 8  No obvious signs of bleeding  Will continue to monitor and transfuse as needed to keep hemoglobin greater than 8  Obstructive sleep apnea   Assessment & Plan    Patient wear CPAP at night  Tobacco use   Assessment & Plan    Patient admits to smoking 2 to 6 cigarettes per day but has not had any in the past several days  Refusing Nicoderm patch  Tobacco cessation education  VTE Prophylaxis: Warfarin (Coumadin)  / sequential compression device   Code Status: Full code  POLST: There is no POLST form on file for this patient (pre-hospital)  Discussion with family: No family present  Anticipated Length of Stay:  Patient will be admitted on an Inpatient basis with an anticipated length of stay of  > 2 midnights  Justification for Hospital Stay: IV medications    Total Time for Visit, including Counseling / Coordination of Care: 30 minutes  Greater than 50% of this total time spent on direct patient counseling and coordination of care      Chief Complaint:   Shortness of breath    History of Present Illness:    Cayla Bishop is a 59 y o  female with history of tobacco use, CHF, COPD, and diabetes who presents with complaints of shortness of breath and cough for the past several weeks  Patient was seen by PCP on 2018 for acute bronchitis with asthma exacerbation  She was treated with Z-Carlos and started on prednisone taper  Patient finished prednisone taper yesterday  She went to PCP today for follow-up and was sent to ED for evaluation  On EKG patient was noted to be tachycardic with heart rate in the 130s  She was given diltiazem 40 mg IV x1  SpO2 88% on room air  Patient was placed on O2 at 2 L  CTA of chest abdomen pelvis shows pulmonary hypertension  Prominent was a ache attenuation of lung fields with differential diagnosis including respiratory bronchiolitis, hypersensitivity pneumonitis, and chronic thromboembolic disease  Soft tissue thickening at hepatic flexure which may be due to peristalsis  Possibility of underlying colonic neoplasm  Colonoscopy recommended if not recently performed  Enlarged heterogeneously enhancing liver correlation with liver function testing recommended  If liver function abnormal consider nonemergent ultrasound  Patient admits to being under a lot of stress lately as father  2 weeks ago and patient is now caring for mother who has dementia  Patient is uninsured and therefore does not have inhalers which were prescribed to her  Patient admits to smoking 2-6 cigarettes daily but has not smoked in several days because she has no cigarettes  On exam patient is noted to have wheezing throughout with fine rales in bilateral bases  Breathing easy on O2 at 2 L via nasal cannula  Moderate nonpitting edema noted to bilateral lower extremities  Patient states that she gained approximately 30-40 lb in 1 years time  Patient denies fevers or chills  She has frequent productive cough for thick white sputum    Patient feels intermittent chest pain and palpitations  None currently  Review of Systems:    Review of Systems   Constitutional: Positive for activity change, fatigue and unexpected weight change  Negative for appetite change, chills and fever  HENT: Negative for congestion, rhinorrhea, sinus pressure and sneezing  Respiratory: Positive for cough, shortness of breath and wheezing  Negative for apnea  Cardiovascular: Positive for chest pain, palpitations and leg swelling  Intermittent chest pain and palpitations  Gastrointestinal: Negative for abdominal pain, constipation, diarrhea, nausea and vomiting  Genitourinary: Negative for dysuria  Neurological: Negative for dizziness, seizures, syncope, facial asymmetry, weakness, light-headedness, numbness and headaches  Psychiatric/Behavioral: Negative for agitation and confusion  The patient is not nervous/anxious  All other systems reviewed and are negative        Past Medical and Surgical History:     Past Medical History:   Diagnosis Date    Anemia     Cervical radiculopathy     CHF (congestive heart failure) (HCC)     Chronic low back pain     Chronic obstructive asthma ([facility] ) 2/20/2018    Community acquired pneumonia     Diabetes mellitus ([facility] )     Diabetes mellitus with hyperglycemia (HCC)     Elevated liver enzymes     GERD (gastroesophageal reflux disease)     Paresthesia of upper extremity     Plantar fasciitis     Restless leg     Sexual dysfunction     Sleep apnea, obstructive     Tenosynovitis of finger     Tinea corporis     Weakness of upper extremity        Past Surgical History:   Procedure Laterality Date    ABDOMINAL SURGERY      CARPAL TUNNEL RELEASE Bilateral     CHOLECYSTECTOMY      laparoscopic    GASTRIC BYPASS      for morbid obesity with gilda en y    HERNIA REPAIR      HYSTERECTOMY      WI EXCIS PRIMARY GANGLION WRIST Left 12/14/2017    Procedure: LONG FINGER GANGLION CYST EXCISION;  Surgeon: Shane Hansen MD;  [facility];  Service: Orthopedics    MN INCISE FINGER TENDON SHEATH Left 12/14/2017    Procedure: THUMB, LONG, RING, TRIGGER FINGER RELEASE;  Surgeon: Erica Rust MD;  Location:  MAIN OR;  Service: Orthopedics    SHOULDER SURGERY         Meds/Allergies:    Prior to Admission medications    Medication Sig Start Date End Date Taking?  Authorizing Provider   albuterol (PROAIR HFA) 90 mcg/act inhaler Inhale 1 puff every 4 (four) hours as needed for wheezing  Patient taking differently: Inhale 2 puffs every 4 (four) hours as needed for wheezing   11/5/18   Laith Olivares MD   ascorbic acid (VITAMIN C) 1000 MG tablet Take 1,000 mg by mouth daily      Historical Provider, MD   Blood Glucose Monitoring Suppl (NABILA CONTOUR NEXT MONITOR) w/Device KIT by Does not apply route daily 7/14/17   Historical Provider, MD   Cholecalciferol 1000 units tablet Take 1,000 Units by mouth daily      Historical Provider, MD   furosemide (LASIX) 20 mg tablet TAKE TWO TABLETS BY MOUTH TWICE DAILY 8/1/18   Laith Olivares MD   LORazepam (ATIVAN) 0 5 mg tablet Take 2 tabs by mouth at [facility] and 1 every 6 hours as needed for anxiety 11/5/18   Laith Olivares MD   metFORMIN (GLUCOPHAGE-XR) 500 mg 24 hr tablet TAKE 2 TABLETS BY MOUTH TWICE DAILY WITH  MEALS 10/4/18   Laith Olivares MD   metoprolol succinate (TOPROL-XL) 50 mg 24 hr tablet Take 1 tablet (50 mg total) by mouth daily 3/8/18   Laith Olivares MD   nystatin (MYCOSTATIN) powder Apply 1 application topically 2 (two) times a day as needed   1/15/18   Historical Provider, MD   omeprazole (PriLOSEC) 40 MG capsule Take 40 mg by mouth daily      Historical Provider, MD   potassium chloride (K-DUR) 10 mEq tablet TAKE ONE TABLET BY MOUTH TWICE DAILY 5/2/18   Laith Olivares MD   predniSONE 10 mg tablet 4 tabs x 3 days, 3 tabs x 3 days 2 tabs x 3 days 1 tab x 3 days, 1/2 tab x 3 days then stop  Patient not taking: Reported on 11/30/2018 11/13/18   VICKY Sloan rOPINIRole (REQUIP) 2 mg tablet Take 1 tablet (2 mg total) by mouth daily at bedtime 11/5/18   Laith Olivares MD   traMADol (ULTRAM) 50 mg tablet Take 2 tablets in am and 2 tablets in pm 11/14/18   Laith Olivares MD   traZODone (DESYREL) 150 mg tablet TAKE 1 TABLET BY MOUTH ONCE DAILY AT BEDTIME 10/4/18   Laith Olivares MD   fluconazole (DIFLUCAN) 150 mg tablet Take one tablet and if symptoms still persist in 3 days take a second tablet 3/20/17 11/30/18  Historical Provider, MD     I have reviewed home medications with patient personally  Allergies: Allergies   Allergen Reactions    Iron Dextran Swelling    Januvia [Sitagliptin] Shortness Of Breath    Jardiance [Empagliflozin] Shortness Of Breath    Clonazepam Other (See Comments)     Unknown reaction    Codeine Itching and Other (See Comments)     itch;mary kay morphine,takes ultram @home    Lactose     Oxycodone-Acetaminophen      Other reaction(s):  Other (See Comments)  (PERCOCET) MILD RASH    Oxycodone-Acetaminophen Anxiety       Social History:     Marital Status: Significant Other   Occupation:  Unemployed, cares for her elderly mother  Patient Pre-hospital Living Situation:  Lives alone  Patient Pre-hospital Level of Mobility:  Independent  Patient Pre-hospital Diet Restrictions:  None  Substance Use History:   History   Alcohol Use    Yes     Comment: about 6 [employer] light every night     History   Smoking Status    Current Every Day Smoker    Packs/day: 0 25    Years: 9 00    Types: Cigarettes   Smokeless Tobacco    Never Used     Comment: hasn't smoked for a few days d/t being sick     History   Drug Use No       Family History:    non-contributory    Physical Exam:     Vitals:   Blood Pressure: 129/81 (11/30/18 1810)  Pulse: (!) 118 (11/30/18 2114)  Temperature: 98 6 °F (37 °C) (11/30/18 1810)  Temp Source: Oral (11/30/18 1810)  Respirations: 20 (11/30/18 2114)  Height: 5' 3" (160 cm) (11/30/18 1810)  Weight - Scale: 121 kg (267 lb) (11/30/18 1810)  SpO2: 98 % (11/30/18 2235)    Physical Exam   Constitutional: She is oriented to person, place, and time  She appears well-developed and well-nourished  She is cooperative  No distress  Nasal cannula in place  HENT:   Head: Normocephalic and atraumatic  Eyes: Pupils are equal, round, and reactive to light  EOM are normal    Neck: Normal range of motion  Neck supple  Cardiovascular: Normal heart sounds and intact distal pulses  An irregular rhythm present  Tachycardia present  Exam reveals no gallop and no friction rub  No murmur heard  Pulmonary/Chest: Effort normal  No respiratory distress  She has wheezes  She has rales in the right lower field and the left lower field  Abdominal: Soft  Bowel sounds are normal  She exhibits no distension  There is no tenderness  Musculoskeletal: Normal range of motion  She exhibits no edema  Neurological: She is alert and oriented to person, place, and time  GCS eye subscore is 4  GCS verbal subscore is 5  GCS motor subscore is 6  Skin: Skin is warm and dry  Psychiatric: Her speech is normal and behavior is normal  Judgment and thought content normal  Cognition and memory are normal    Tearful   Nursing note and vitals reviewed  Additional Data:     Lab Results: I have personally reviewed pertinent reports          Results from last 7 days  Lab Units 11/30/18  1432   WBC Thousand/uL 9 88   HEMOGLOBIN g/dL 10 8*   HEMATOCRIT % 36 5   PLATELETS Thousands/uL 346   NEUTROS PCT % 71   LYMPHS PCT % 17   MONOS PCT % 8   EOS PCT % 2       Results from last 7 days  Lab Units 11/30/18  1433   SODIUM mmol/L 136   POTASSIUM mmol/L 3 7   CHLORIDE mmol/L 94*   CO2 mmol/L 36*   BUN mg/dL 7   CREATININE mg/dL 0 73   ANION GAP mmol/L 6   CALCIUM mg/dL 8 5   ALBUMIN g/dL 3 4*   TOTAL BILIRUBIN mg/dL 0 40   ALK PHOS U/L 138*   ALT U/L 59   AST U/L 32   GLUCOSE RANDOM mg/dL 187*       Results from last 7 days  Lab Units 11/30/18  1433   INR  1 22* Results from last 7 days  Lab Units 11/30/18 2059   POC GLUCOSE mg/dl 214*               Imaging: I have personally reviewed pertinent reports  PE Study with CT Abdomen and Pelvis with contrast   Final Result by Ita Car MD (11/30 1637)      1  No acute pulmonary embolism  Severe pulmonary artery enlargement consistent with pulmonary hypertension  2   Prominent mosaic attenuation of the lung fields, increased from the prior CT  Differential includes respiratory bronchiolitis, hypersensitivity pneumonitis, and chronic thromboembolic disease, particularly given the enlarged pulmonary artery  3   No acute inflammatory process in the abdomen or pelvis  4   Soft tissue thickening at the hepatic flexure which may be due to peristalsis  This appears more prominent than expected, however, and the possibility of underlying colonic neoplasm is not excluded  Further evaluation with colonoscopy is    recommended if this has not been recently performed  5   Enlarged heterogeneously enhancing liver  While this may be a normal variant, correlation with liver function testing recommended  If liver function tests are abnormal, consider follow-up nonemergent ultrasound evaluation  I personally discussed this study with Marcial Bolanos on 11/30/2018 at 4:27 PM                            Workstation performed: [id number]             EKG, Pathology, and Other Studies Reviewed on Admission:   · EKG: Atrial flutter with variable AV block    Allscripts / Epic Records Reviewed: Yes     ** Please Note: This note has been constructed using a voice recognition system   **

## 2018-12-01 NOTE — CONSULTS
Consultation - GI   Raymundo Ko 59 y o  female MRN: [id number]  Unit/Bed#: -02 Encounter: [id number]    Consults    ASSESSMENT:  1  Abnormal CT - hep flexure density likely collapsed bowel, but cannot rule out underlying malignancy in a patient with new anemia  Also will need anticoagulation for her new onset AFib so will need to assess  2  Anemia - new since 2017 - multifactorial, h/o gastric bypass surgery, no overt GI bleeding, last EGD/Colon over 9 years ago  Check iron panel  3  Acute respiratory failure with hypoxia  4  New onset AFib and CHF - feeling better today    PLAN:  1  Add on iron panel to characterize the anemia  2  Likely will benefit from IV Iron at one point, but follow H&H and transfuse prn  3  D/C Coumadin, and OK to continue Lovenox for now for new onset AFib  4  Need cardiac/pulm clearance for EGD/Colonoscopy Monday  5  Start some dulcolax tonight, clear liquids, and will start prep tomorrow  Chief Complaint   Patient presents with    Asthma     Patient presents to the ER stating she was sent from dr Eliana Napoles office for recurrent asthma symptoms after a course of prednisone and antibiotics  sent for cxr  HPI:   This is a 59 y  o female who GI is consulted for abnormal CT and anemia  Pt has a known h/o gilda-en-Y gastric bypass surgery - last GI evaluation about 9 years ago w EGD/Colon  Pt states that she has been feeling unwell for quite some time but because she was taking care of her parents, she has been unable to really follow up with medical care for herself  Her father just recently passed away after a long batter with Parkinsons and she just had the this Monday  Finally by Tuesday, she was feeling incredible amounts of fatigue, then progressive SOB/RICCI/CP  Upon admission she was found to have new onset AFib, resp failure  CTA was negative for PE     RE her GI issues - no sig issues, has progressively gained weight since her bypass surgery   No N/V  @ baseline, she has constipation  Denies ever seeing any bleeding  CTA at adm also checked for A/P which showed a "soft tissue density" in the hep flexure - thought likely collapsed bowel but cannot rule out underlying malignancy   Also with fatty liver, but lfts normal      Past Medical History:   Diagnosis Date    Anemia     Cervical radiculopathy     CHF (congestive heart failure) (HCC)     Chronic low back pain     [facility] obstructive asthma ([facility] ) 2/20/2018    Community acquired pneumonia     Diabetes mellitus ([facility] )     Diabetes mellitus with hyperglycemia (HCC)     Elevated liver enzymes     GERD (gastroesophageal reflux disease)     Paresthesia of upper extremity     Plantar fasciitis     Restless leg     Sexual dysfunction     Sleep apnea, obstructive     Tenosynovitis of finger     Tinea corporis     Weakness of upper extremity        Past Surgical History:   Procedure Laterality Date    ABDOMINAL SURGERY      CARPAL TUNNEL RELEASE Bilateral     CHOLECYSTECTOMY      laparoscopic    GASTRIC BYPASS      for morbid obesity with gilda en y    HERNIA REPAIR      HYSTERECTOMY      AL EXCIS PRIMARY GANGLION WRIST Left 12/14/2017    Procedure: LONG FINGER GANGLION CYST EXCISION;  Surgeon: Elsy Flores MD;  [facility];  Service: Orthopedics    AL INCISE FINGER TENDON SHEATH Left 12/14/2017    Procedure: THUMB, LONG, RING, TRIGGER FINGER RELEASE;  Surgeon: Elsy Flores MD;  [facility];  Service: Orthopedics    SHOULDER SURGERY         Prescriptions Prior to Admission   Medication    albuterol (PROAIR HFA) 90 mcg/act inhaler    ascorbic acid (VITAMIN C) 1000 MG tablet    Blood Glucose Monitoring Suppl (Attune CONTOUR NEXT MONITOR) w/Device KIT    Cholecalciferol 1000 units tablet    furosemide (LASIX) 20 mg tablet    LORazepam (ATIVAN) 0 5 mg tablet    metFORMIN (GLUCOPHAGE-XR) 500 mg 24 hr tablet    metoprolol succinate (TOPROL-XL) 50 mg 24 hr tablet    nystatin (MYCOSTATIN) powder    omeprazole (PriLOSEC) 40 MG capsule    potassium chloride (K-DUR) 10 mEq tablet    predniSONE 10 mg tablet    rOPINIRole (REQUIP) 2 mg tablet    traMADol (ULTRAM) 50 mg tablet    traZODone (DESYREL) 150 mg tablet       Allergies   Allergen Reactions    Iron Dextran Swelling    Januvia [Sitagliptin] Shortness Of Breath    Jardiance [Empagliflozin] Shortness Of Breath    Clonazepam Other (See Comments)     Unknown reaction    Codeine Itching and Other (See Comments)     itch;mary kay morphine,takes ultram @home    Lactose     Oxycodone-Acetaminophen      Other reaction(s): Other (See Comments)  (PERCOCET) MILD RASH    Oxycodone-Acetaminophen Anxiety       Social History     Family History   Problem Relation Age of Onset    Alzheimer's disease Mother     Atrial fibrillation Mother     Dementia Mother     Heart disease Mother         heart problem    Seizures Mother     Parkinsonism Father     Arthritis Father     Atrial fibrillation Father     Substance Abuse Neg Hx         mother,father    Mental illness Neg Hx         mother,father       Review of Systems:  General ROS: + for fatigue, F/C  Psychological ROS: negative for - anxiety or depression  ENT ROS: negative for - headaches or sore throat  Hematological and Lymphatic ROS: negative for - bleeding problems or bruising  Endocrine ROS: negative for - palpitations, polydipsia/polyuria or temperature intolerance  Respiratory ROS: + cough, SOB, RICCI, orthopnea  negative for - stridor or wheezing  Cardiovascular ROS: + for - RICCI, edema, irregular heartbeat or palpitations  Gastrointestinal ROS: negative for - abdominal pain, appetite loss, blood in stools, change in bowel habits, change in stools, diarrhea, gas/bloating, heartburn, hematemesis, melena, nausea/vomiting, stool incontinence or swallowing difficulty/pain  @ baseline with constipation    Genito-Urinary ROS: negative for - hematuria, incontinence or urinary frequency/urgency  Musculoskeletal ROS: negative for - joint pain or muscular weakness  Neurological ROS: negative for - confusion, dizziness, headaches, seizures or tremors    /72   Pulse 87   Temp (!) 97 3 °F (36 3 °C) (Oral)   Resp 18   Ht 5' 3" (1 6 m)   Wt 120 kg (263 lb 7 2 oz)   SpO2 96%   BMI 46 67 kg/m²     PHYSICALEXAM:  General appearance: alert, appears stated age and cooperative, obese  HEENT: Normocephalic, w/o obvious abnormality, atraumatic, PERRL, EOM's intact  Throat: lips, mucosa, and tongue normal; teeth and gums normal  Neck: no adenopathy, no JVD, supple, symmetrical, trachea midline  Lungs: clear to auscultation bilaterally, No wheeze/rales  Heart: regular rate and rhythm, S1, S2 normal, no murmur  Abdomen: soft, non-tender; bowel sounds normal; exam limited by body habitus  Extremities: extremities normal, atraumatic, no cyanosis or edema  Skin: Skin color, texture, turgor normal  No rashes or lesions  Neurologic: Grossly normal      Lab Results   Component Value Date    GLUCOSE 131 (H) 09/22/2017    CALCIUM 8 1 (L) 12/01/2018     09/22/2017    K 4 4 12/01/2018    CO2 34 (H) 12/01/2018    CL 93 (L) 12/01/2018    BUN 8 12/01/2018    CREATININE 0 74 12/01/2018     Lab Results   Component Value Date    WBC 7 56 12/01/2018    HGB 10 3 (L) 12/01/2018    HCT 34 9 12/01/2018    MCV 70 (L) 12/01/2018     12/01/2018     Lab Results   Component Value Date    ALT 51 12/01/2018    AST 23 12/01/2018    ALKPHOS 130 (H) 12/01/2018    BILITOT 0 4 09/22/2017     Lab Results   Component Value Date    INR 1 27 (H) 12/01/2018     No components found for: [id number]  Lab Results   Component Value Date    LIPASE 64 (L) 02/14/2017     No results found for: IRON, TIBC, FERRITIN

## 2018-12-01 NOTE — ASSESSMENT & PLAN NOTE
Patient was seen by PCP on 11/13/2018 and diagnosed with acute bronchitis with asthma exacerbation  She was treated with a Z-Carlos and prednisone taper  She was instructed to return to PCP if no improvement  On exam patient is noted to have expiratory wheezing throughout  Continues with frequent productive cough for thick white sputum  Will place patient on IV Solu-Medrol 40 mg q 6 hours, duo nebs q 6 hours, guaifenesin q 4 hours p r n     Albuterol p r n  Shortness of breath or wheezing  Will place inpatient consult to pulmonology

## 2018-12-01 NOTE — ASSESSMENT & PLAN NOTE
SpO2 in the 80s on room air  Most likely due to new onset AFib, CHF, and asthma exacerbation  Placed on O2 at 2 L  SpO2 ranging from 94-96%  Will continue to monitor SpO2 and titrate O2 as needed to keep SpO2 greater than 92%  CTA of chest abdomen and pelvis shows prominent mosaic attenuation of lung fields  Differential diagnosis include respiratory bronchiolitis, hypersensitivity pneumonitis, and chronic thromboembolic disease  Encourage tobacco cessation  Consult pulmonology

## 2018-12-01 NOTE — CONSULTS
Consultation - Pulmonary Medicine   Jermaine Rodas 59 y o  female MRN: [id number]  Unit/Bed#: -02 Encounter: [id number]      Physician Requesting Consult: Dr You Peng  Reason for Consult: WILMA, bronchitis, asthma      Assessment:  1  Acute hypoxic respiratory failure with documented pulse ox 88%, most likely multifactorial including cardiac and pulmonary disease as below  2  Acute on chronic CHF, most likely diastolic and tachycardia induced with New A flutter  3  Chronic obstructive asthma with mild acute exacerbation  4  WILMA compliant with CPAP  5  Suspected pulmonary hypertension based on PA enlargement on CT scan, if true then most likely this is group 2/3  6  Abnormal CT scan with chronic mosaic hypoattenuation/patchy air trapping, seen on prior CT scan  This could be secondary to small airway disease, doubt hypersensitivity pneumonitis with no clear cause, also less likely RB-ILD that is smoking related  7  Morbid obesity  8  Tobacco abuse    Plan:   · Continue oxygen, titrate for pulse ox more than 88%  · Continue to diurese as per Cardiology and also rate controlled/anticoagulation for A flutter  · Encourage smoking cessation  · Continue Breo daily after discharge, during this admission I will start patient on Pulmicort b i d   · Decrease methyl prednisone to Q 12 hours today and switch to prednisone tomorrow then taper by 10 mg every 3 days to stop  · Continue bronchodilators nebulizer therapy  · Encourage weight loss  · Continue CPAP q h s  · Follow echocardiogram and if has severe pulmonary hypertension then further workup to be decided with RHC and/or V/Q scan and other appropriate interventions  · Encourage out of bed and ambulation  · Patient will call on Monday and schedule outpatient follow-up with Dr Cary Galeano    ______________________________________________________________________    HPI:    Jermaine Rodas is a 59 y o  female who presents worsening shortness of breath    Patient has history of chronic asthma diagnosed in her 45s and she was supposed to be on Breo but she does not use on a regular basis, she has her ProAir and nebulizer machine at home  She has chronic dyspnea on exertion and intermittent wheezing, 3 weeks ago she felt sick with worsening shortness of breath and spells of cough and she was seen by a physician and was given azithromycin course with prednisone tapering course that she finished few days ago  She still did not feel better and she continues to have shortness of breath and wheezing and sometimes intermittent chest pain, denies any fever or chills, she has cough with clear sputum sometimes but sometimes nonproductive  She also has legs edema sometimes  She saw her primary care physician yesterday who sent her to the ER  Patient started to feel better today  Patient was found to have tachycardia and diagnosed with atrial flutter during this admission , treated with Cardizem and started on anticoagulation with warfarin  Patient at baseline has obstructive sleep apnea on CPAP 12 that she uses religiously every night  She is not on oxygen at home  She smokes cigarettes about 6-8 cigarettes per day for the past 8 years only and she has been cutting down for the past few days  She lives with her parents and her father unfortunately passed away recently, her mother is an elderly that needs medical attention and she has some agency to help the taking care of her  She has no pets, no exposure to birds, no occupational exposure  She worked as a  in an office in the past       PFT results:  · Spirometry with severe obstruction and reduced vital capacity due to air trapping  · There is significant improvement with bronchodilators  · Lung volumes with increased residual volume suggesting air trapping  · Normal diffusion capacity    Review of Systems:   12 points Full review of systems was performed   Aside from what was mentioned in the HPI, it is otherwise negative      Historical Information   Past Medical History:   Diagnosis Date    Anemia     Cervical radiculopathy     CHF (congestive heart failure) (HCC)     Chronic low back pain     Chronic obstructive asthma ([facility] [address] ) 2/20/2018    Community acquired pneumonia     Diabetes mellitus ([facility] )     Diabetes mellitus with hyperglycemia (HCC)     Elevated liver enzymes     GERD (gastroesophageal reflux disease)     Paresthesia of upper extremity     Plantar fasciitis     Restless leg     Sexual dysfunction     Sleep apnea, obstructive     Tenosynovitis of finger     Tinea corporis     Weakness of upper extremity      Past Surgical History:   Procedure Laterality Date    ABDOMINAL SURGERY      CARPAL TUNNEL RELEASE Bilateral     CHOLECYSTECTOMY      laparoscopic    GASTRIC BYPASS      for morbid obesity with gilda en y   [address] PRIMARY GANGLION WRIST Left 12/14/2017    Procedure: LONG FINGER GANGLION CYST EXCISION;  Surgeon: Shayy Jordan MD;  Location:  MAIN OR;  Service: Orthopedics    ID INCISE FINGER TENDON SHEATH Left 12/14/2017    Procedure: Raymondo Cutting, RING, TRIGGER FINGER RELEASE;  Surgeon: Shayy Jordan MD;  Location: [facility];  Service: Orthopedics    SHOULDER SURGERY       Social History   History   Alcohol Use    Yes     Comment: about 6 [employer] light every night     History   Smoking Status    Current Every Day Smoker    Packs/day: 0 25    Years: 9 00    Types: Cigarettes   Smokeless Tobacco    Never Used     Comment: hasn't smoked for a few days d/t being sick       Occupational history:   no occupational exposure    Family History:   Family History   Problem Relation Age of Onset    Alzheimer's disease Mother     Atrial fibrillation Mother     Dementia Mother     Heart disease Mother         heart problem    Seizures Mother     Parkinsonism Father     Arthritis Father     Atrial fibrillation Father     Substance Abuse Neg Hx         mother,father    Mental illness Neg Hx         mother,father       Medications: The patient's active and prehospital medications were reviewed      Current Facility-Administered Medications:  acetaminophen 650 mg Oral Q4H PRN VICKY Rothman    albuterol 2 puff Inhalation Q4H PRN VICKY Rothman    ascorbic acid 1,000 mg Oral Daily VICKY Rothman    cholecalciferol 1,000 Units Oral Daily VICKY Cohen    diltiazem 1-15 mg/hr Intravenous Titrated VICKY Rothman Last Rate: 10 mg/hr (12/01/18 0805)   enoxaparin 1 mg/kg Subcutaneous Q12H Patsy Brown MD    furosemide 40 mg Intravenous BID VICKY Cannon    guaiFENesin 200 mg Oral Q4H PRN VICKY Cannon    influenza vaccine 0 5 mL Intramuscular Prior to discharge Neil Shepherd MD    insulin lispro 2-12 Units Subcutaneous TID AC Hiram Monday, MD    levalbuterol 0 63 mg Nebulization Q6H PRN VICKY Cannon    methylPREDNISolone sodium succinate 40 mg Intravenous Q6H [address] VICKY Cohen    metoprolol 5 mg Intravenous Q6H PRN VICKY Cannon    metoprolol succinate 50 mg Oral Daily VICKY Cohen    ondansetron 4 mg Intravenous Q6H PRN VICKY Cohen    pantoprazole 40 mg Oral Early Morning VICKY Cohen    pneumococcal 13-valent conjugate vaccine 0 5 mL Intramuscular Prior to discharge Neil Shepherd MD    potassium chloride 10 mEq Oral BID VICKY Cannon    rOPINIRole 2 mg Oral HS VICKY Cohen    traMADol 100 mg Oral Q12H Hiram MondayMD mcallisterZODone 150 mg Oral HS VICKY Cohen    warfarin 5 mg Oral Daily (warfarin) VICKY Rothman          PhysicalExamination:  Vitals:   Vitals:    11/30/18 2300 12/01/18 0335 12/01/18 0714 12/01/18 0917   BP: 123/84  131/80    BP Location: Right arm  Right arm    Pulse: (!) 116  (!) 133    Resp: 18  (!) 23    Temp: 98 4 °F (36 9 °C)      TempSrc: Tympanic      SpO2: 94% 96% 95% 91%   Weight:       Height:         Temp  Min: 97 5 °F (36 4 °C)  Max: 98 6 °F (37 °C)  IBW: 52 4 kg    SpO2: 91 %,   SpO2 Activity: At Rest,   O2 Device: None (Room air)    General: alert, not in acute distress  HEENT: PERRL, no icteric sclera or cyanosis, no thrush  Neck:  Supple, no lymphadenopathy or thyromegaly, no JVD  Lungs:  Equal breath sounds with mild scattered wheezes bilaterally, no crackles  Heart: S1S2 irregular, no murmures or gallops  Abdomen: soft, non-tender, bowel sounds  present  Extrimities:  Trace edema, no clubbing or cyanosis  Neuro: Alert and oriented x 3, no focal neurodeficits   Skin: intact, no rashes    Diagnostic Data:  CBC:     Results from last 7 days  Lab Units 12/01/18  0505 11/30/18  1432   WBC Thousand/uL 7 56 9 88   HEMOGLOBIN g/dL 10 3* 10 8*   HEMATOCRIT % 34 9 36 5   PLATELETS Thousands/uL 331 346       CMP:     Results from last 7 days  Lab Units 12/01/18  0505 11/30/18  1433   POTASSIUM mmol/L 4 4 3 7   CHLORIDE mmol/L 93* 94*   CO2 mmol/L 34* 36*   BUN mg/dL 8 7   CREATININE mg/dL 0 74 0 73   CALCIUM mg/dL 8 1* 8 5   ALK PHOS U/L 130* 138*   ALT U/L 51 59   AST U/L 23 32     PT/INR:   Lab Results   Component Value Date    INR 1 27 (H) 12/01/2018    INR 1 22 (H) 11/30/2018   ,     Microbiology:         ABG: No results found for: PHART, RWV3QIB, PO2ART, FWG0PIJ, Z5CWVTSS, BEART, SOURCE    Imaging:  Chest CT scan reviewed on PACs: Prominent mosaic attenuation of the lung fields, increased from the prior CT  Differential includes respiratory bronchiolitis, hypersensitivity pneumonitis, and chronic thromboembolic disease, particularly given the enlarged pulmonary artery    Cardiac lab/EKG/telemetry/ECHO:     Results from last 7 days  Lab Units 12/01/18  0149 11/30/18  2303 11/30/18  1432   TROPONIN I ng/mL <0 02 <0 02 <0 02            Code Status: Level 1 - Full Code    Edward Urias MD    Portions of the record may have been created with voice recognition software  Occasional wrong word or "sound a like" substitutions may have occurred due to the inherent limitations of voice recognition software  Read the chart carefully and recognize, using context, where substitutions have occurred

## 2018-12-02 LAB
ALBUMIN SERPL BCP-MCNC: 3.5 G/DL (ref 3.5–5)
ALP SERPL-CCNC: 128 U/L (ref 46–116)
ALT SERPL W P-5'-P-CCNC: 46 U/L (ref 12–78)
ANION GAP SERPL CALCULATED.3IONS-SCNC: 10 MMOL/L (ref 4–13)
ANISOCYTOSIS BLD QL SMEAR: PRESENT
AST SERPL W P-5'-P-CCNC: 18 U/L (ref 5–45)
BASOPHILS # BLD MANUAL: 0 THOUSAND/UL (ref 0–0.1)
BASOPHILS NFR MAR MANUAL: 0 % (ref 0–1)
BILIRUB SERPL-MCNC: 0.7 MG/DL (ref 0.2–1)
BUN SERPL-MCNC: 12 MG/DL (ref 5–25)
CALCIUM SERPL-MCNC: 8.4 MG/DL (ref 8.3–10.1)
CHLORIDE SERPL-SCNC: 90 MMOL/L (ref 100–108)
CO2 SERPL-SCNC: 32 MMOL/L (ref 21–32)
CREAT SERPL-MCNC: 0.8 MG/DL (ref 0.6–1.3)
DACRYOCYTES BLD QL SMEAR: PRESENT
EOSINOPHIL # BLD MANUAL: 0 THOUSAND/UL (ref 0–0.4)
EOSINOPHIL NFR BLD MANUAL: 0 % (ref 0–6)
ERYTHROCYTE [DISTWIDTH] IN BLOOD BY AUTOMATED COUNT: 19.9 % (ref 11.6–15.1)
FERRITIN SERPL-MCNC: 13 NG/ML (ref 8–388)
GFR SERPL CREATININE-BSD FRML MDRD: 81 ML/MIN/1.73SQ M
GLUCOSE SERPL-MCNC: 223 MG/DL (ref 65–140)
GLUCOSE SERPL-MCNC: 284 MG/DL (ref 65–140)
GLUCOSE SERPL-MCNC: 301 MG/DL (ref 65–140)
GLUCOSE SERPL-MCNC: 301 MG/DL (ref 65–140)
GLUCOSE SERPL-MCNC: 314 MG/DL (ref 65–140)
HCT VFR BLD AUTO: 34.8 % (ref 34.8–46.1)
HGB BLD-MCNC: 10.3 G/DL (ref 11.5–15.4)
HYPERCHROMIA BLD QL SMEAR: PRESENT
INR PPP: 1.61 (ref 0.86–1.17)
IRON SATN MFR SERPL: 3 %
IRON SERPL-MCNC: 13 UG/DL (ref 50–170)
LYMPHOCYTES # BLD AUTO: 0.61 THOUSAND/UL (ref 0.6–4.47)
LYMPHOCYTES # BLD AUTO: 5 % (ref 14–44)
MAGNESIUM SERPL-MCNC: 2 MG/DL (ref 1.6–2.6)
MCH RBC QN AUTO: 20.6 PG (ref 26.8–34.3)
MCHC RBC AUTO-ENTMCNC: 29.6 G/DL (ref 31.4–37.4)
MCV RBC AUTO: 70 FL (ref 82–98)
MICROCYTES BLD QL AUTO: PRESENT
MONOCYTES # BLD AUTO: 0.37 THOUSAND/UL (ref 0–1.22)
MONOCYTES NFR BLD: 3 % (ref 4–12)
MYELOCYTES NFR BLD MANUAL: 1 % (ref 0–1)
NEUTROPHILS # BLD MANUAL: 11.1 THOUSAND/UL (ref 1.85–7.62)
NEUTS SEG NFR BLD AUTO: 91 % (ref 43–75)
NRBC BLD AUTO-RTO: 0 /100 WBCS
OVALOCYTES BLD QL SMEAR: PRESENT
PLATELET # BLD AUTO: 401 THOUSANDS/UL (ref 149–390)
PLATELET BLD QL SMEAR: ADEQUATE
PMV BLD AUTO: 11.4 FL (ref 8.9–12.7)
POLYCHROMASIA BLD QL SMEAR: PRESENT
POTASSIUM SERPL-SCNC: 4 MMOL/L (ref 3.5–5.3)
PROT SERPL-MCNC: 6.7 G/DL (ref 6.4–8.2)
PROTHROMBIN TIME: 18.2 SECONDS (ref 11.8–14.2)
RBC # BLD AUTO: 5.01 MILLION/UL (ref 3.81–5.12)
RBC MORPH BLD: PRESENT
SODIUM SERPL-SCNC: 132 MMOL/L (ref 136–145)
TARGETS BLD QL SMEAR: PRESENT
TIBC SERPL-MCNC: 512 UG/DL (ref 250–450)
TOTAL CELLS COUNTED SPEC: 100
WBC # BLD AUTO: 12.2 THOUSAND/UL (ref 4.31–10.16)

## 2018-12-02 PROCEDURE — 85007 BL SMEAR W/DIFF WBC COUNT: CPT | Performed by: INTERNAL MEDICINE

## 2018-12-02 PROCEDURE — 94640 AIRWAY INHALATION TREATMENT: CPT

## 2018-12-02 PROCEDURE — 94660 CPAP INITIATION&MGMT: CPT

## 2018-12-02 PROCEDURE — 99233 SBSQ HOSP IP/OBS HIGH 50: CPT | Performed by: INTERNAL MEDICINE

## 2018-12-02 PROCEDURE — 99232 SBSQ HOSP IP/OBS MODERATE 35: CPT | Performed by: INTERNAL MEDICINE

## 2018-12-02 PROCEDURE — 85027 COMPLETE CBC AUTOMATED: CPT | Performed by: INTERNAL MEDICINE

## 2018-12-02 PROCEDURE — 85610 PROTHROMBIN TIME: CPT | Performed by: INTERNAL MEDICINE

## 2018-12-02 PROCEDURE — 83735 ASSAY OF MAGNESIUM: CPT | Performed by: INTERNAL MEDICINE

## 2018-12-02 PROCEDURE — 80053 COMPREHEN METABOLIC PANEL: CPT | Performed by: INTERNAL MEDICINE

## 2018-12-02 PROCEDURE — 94760 N-INVAS EAR/PLS OXIMETRY 1: CPT

## 2018-12-02 PROCEDURE — 94664 DEMO&/EVAL PT USE INHALER: CPT

## 2018-12-02 PROCEDURE — 82948 REAGENT STRIP/BLOOD GLUCOSE: CPT

## 2018-12-02 RX ORDER — LORAZEPAM 1 MG/1
1 TABLET ORAL
Status: DISCONTINUED | OUTPATIENT
Start: 2018-12-02 | End: 2018-12-07 | Stop reason: HOSPADM

## 2018-12-02 RX ORDER — MAGNESIUM CARB/ALUMINUM HYDROX 105-160MG
296 TABLET,CHEWABLE ORAL ONCE
Status: COMPLETED | OUTPATIENT
Start: 2018-12-02 | End: 2018-12-02

## 2018-12-02 RX ADMIN — TRAMADOL HYDROCHLORIDE 100 MG: 50 TABLET, COATED ORAL at 21:31

## 2018-12-02 RX ADMIN — DILTIAZEM HYDROCHLORIDE 60 MG: 60 TABLET, FILM COATED ORAL at 06:47

## 2018-12-02 RX ADMIN — ROPINIROLE 2 MG: 2 TABLET, FILM COATED ORAL at 22:31

## 2018-12-02 RX ADMIN — BUDESONIDE 0.5 MG: 0.5 INHALANT RESPIRATORY (INHALATION) at 08:17

## 2018-12-02 RX ADMIN — PANTOPRAZOLE SODIUM 40 MG: 40 TABLET, DELAYED RELEASE ORAL at 06:47

## 2018-12-02 RX ADMIN — INSULIN LISPRO 1 UNITS: 100 INJECTION, SOLUTION INTRAVENOUS; SUBCUTANEOUS at 22:35

## 2018-12-02 RX ADMIN — DILTIAZEM HYDROCHLORIDE 60 MG: 60 TABLET, FILM COATED ORAL at 21:31

## 2018-12-02 RX ADMIN — VITAMIN D, TAB 1000IU (100/BT) 1000 UNITS: 25 TAB at 08:13

## 2018-12-02 RX ADMIN — TRAZODONE HYDROCHLORIDE 150 MG: 150 TABLET ORAL at 22:31

## 2018-12-02 RX ADMIN — ACETAMINOPHEN 650 MG: 325 TABLET, FILM COATED ORAL at 12:21

## 2018-12-02 RX ADMIN — FUROSEMIDE 40 MG: 10 INJECTION, SOLUTION INTRAVENOUS at 08:12

## 2018-12-02 RX ADMIN — PREDNISONE 40 MG: 20 TABLET ORAL at 08:13

## 2018-12-02 RX ADMIN — LORAZEPAM 1 MG: 1 TABLET ORAL at 21:31

## 2018-12-02 RX ADMIN — ENOXAPARIN SODIUM 120 MG: 150 INJECTION SUBCUTANEOUS at 08:13

## 2018-12-02 RX ADMIN — INSULIN LISPRO 6 UNITS: 100 INJECTION, SOLUTION INTRAVENOUS; SUBCUTANEOUS at 11:41

## 2018-12-02 RX ADMIN — TRAMADOL HYDROCHLORIDE 100 MG: 50 TABLET, COATED ORAL at 08:12

## 2018-12-02 RX ADMIN — DILTIAZEM HYDROCHLORIDE 15 MG/HR: 5 INJECTION INTRAVENOUS at 01:02

## 2018-12-02 RX ADMIN — LORAZEPAM 1 MG: 1 TABLET ORAL at 01:43

## 2018-12-02 RX ADMIN — LEVALBUTEROL HYDROCHLORIDE 0.63 MG: 0.63 SOLUTION RESPIRATORY (INHALATION) at 19:47

## 2018-12-02 RX ADMIN — BUDESONIDE 0.5 MG: 0.5 INHALANT RESPIRATORY (INHALATION) at 19:47

## 2018-12-02 RX ADMIN — FUROSEMIDE 40 MG: 10 INJECTION, SOLUTION INTRAVENOUS at 17:00

## 2018-12-02 RX ADMIN — METOPROLOL SUCCINATE 50 MG: 50 TABLET, EXTENDED RELEASE ORAL at 08:13

## 2018-12-02 RX ADMIN — INSULIN LISPRO 8 UNITS: 100 INJECTION, SOLUTION INTRAVENOUS; SUBCUTANEOUS at 08:22

## 2018-12-02 RX ADMIN — POTASSIUM CHLORIDE 10 MEQ: 750 TABLET, EXTENDED RELEASE ORAL at 08:13

## 2018-12-02 RX ADMIN — POLYETHYLENE GLYCOL 3350, SODIUM SULFATE ANHYDROUS, SODIUM BICARBONATE, SODIUM CHLORIDE, POTASSIUM CHLORIDE 4000 ML: 236; 22.74; 6.74; 5.86; 2.97 POWDER, FOR SOLUTION ORAL at 08:24

## 2018-12-02 RX ADMIN — MAGNESIUM CITRATE 296 ML: 1.75 LIQUID ORAL at 15:19

## 2018-12-02 RX ADMIN — OXYCODONE HYDROCHLORIDE AND ACETAMINOPHEN 1000 MG: 500 TABLET ORAL at 08:13

## 2018-12-02 RX ADMIN — LEVALBUTEROL HYDROCHLORIDE 0.63 MG: 0.63 SOLUTION RESPIRATORY (INHALATION) at 08:17

## 2018-12-02 RX ADMIN — POTASSIUM CHLORIDE 10 MEQ: 750 TABLET, EXTENDED RELEASE ORAL at 17:01

## 2018-12-02 RX ADMIN — INSULIN LISPRO 8 UNITS: 100 INJECTION, SOLUTION INTRAVENOUS; SUBCUTANEOUS at 17:01

## 2018-12-02 RX ADMIN — DILTIAZEM HYDROCHLORIDE 60 MG: 60 TABLET, FILM COATED ORAL at 13:43

## 2018-12-02 NOTE — PROGRESS NOTES
Progress Note - Pulmonary   Mattie Joy 59 y o  female MRN: [id number]  Unit/Bed#: -02 Encounter: [id number]    Assessment:  1  Acute hypoxic respiratory failure with documented pulse ox 88%, most likely multifactorial including cardiac and pulmonary disease as below  2  Acute on chronic CHF, most likely diastolic and tachycardia induced with New A flutter  3  Chronic obstructive asthma with acute exacerbation  4  WILMA compliant with CPAP  5  PA enlargement on CT scan but estimated peak PA pressure is 25 on echocardiogram with no evidence of pulmonary hypertension  6  Abnormal CT scan with chronic mosaic hypoattenuation/patchy air trapping, seen on prior CT scan  This could be secondary to small airway disease, doubt hypersensitivity pneumonitis with no clear cause, also less likely RB-ILD that is smoking related  7  Morbid obesity  8  Tobacco abuse    Plan:  · Continue bronchodilators nebulizer therapy  · Continue Pulmicort nebulizer b i d   · Continue prednisone 40 mg daily for now  · Diuresis as per Cardiology  · Continue rate control with Cardizem as per Cardiology and primary team  · Encourage out of bed and ambulation  · Titrate oxygen to maintain pulse ox more than 88%  · Continue CPAP q h s  · Smoking cessation    ----------------------------------------------------------------------------------------------------------------------    HPI/Interval History:   Patient feels fatigued/tired because she could not sleep overnight, improving shortness of breath, afebrile    Vitals:   Blood pressure 116/67, pulse 75, temperature 98 1 °F (36 7 °C), temperature source Axillary, resp  rate 20, height 5' 3" (1 6 m), weight 120 kg (263 lb 10 7 oz), SpO2 94 %  ,Body mass index is 46 71 kg/m²    SpO2: 94 %  SpO2 Activity: At Rest  O2 Device: None (Room air)    Physical Exam:   Gen: Alert and without respiratory distress  Chest:  Mild wheezing bilaterally with equal breath sounds and good air movement  Cardiac:  S1-S2 irregular  Abdomen: Soft and nontender  Bowel sounds are present  Extremities:  Trace edema      Labs:    CBC:    Results from last 7 days  Lab Units 12/02/18  0508 12/01/18  0505 11/30/18  1432   WBC Thousand/uL 12 20* 7 56 9 88   HEMOGLOBIN g/dL 10 3* 10 3* 10 8*   HEMATOCRIT % 34 8 34 9 36 5   PLATELETS Thousands/uL 401* 331 346       CMP:     Results from last 7 days  Lab Units 12/02/18  0508 12/01/18  0505 11/30/18  1433   POTASSIUM mmol/L 4 0 4 4 3 7   CHLORIDE mmol/L 90* 93* 94*   CO2 mmol/L 32 34* 36*   BUN mg/dL 12 8 7   CREATININE mg/dL 0 80 0 74 0 73   CALCIUM mg/dL 8 4 8 1* 8 5   ALK PHOS U/L 128* 130* 138*   ALT U/L 46 51 59   AST U/L 18 23 32         Radha Yancey MD    Portions of the record may have been created with voice recognition software  Occasional wrong word or "sound a like" substitutions may have occurred due to the inherent limitations of voice recognition software  Read the chart carefully and recognize, using context, where substitutions have occurred

## 2018-12-02 NOTE — PROGRESS NOTES
Progress Note - GI   Jettfercarlos Spotted 59 y o  female MRN: [id number]  Unit/Bed#: -02 Encounter: [id number]      ASSESSMENT:  1  Abnormal CT - hep flexure density likely collapsed bowel, but cannot rule out underlying malignancy in a patient with new anemia  Also will need anticoagulation for her new onset AFib so will need to assess  2  Iron deficiency Anemia - new since 2017 - multifactorial, h/o gastric bypass surgery, no overt GI bleeding, last EGD/Colon over 9 years ago  3  Acute respiratory failure with hypoxia   4  New onset AFib and CHF - feeling better today - cardiology note appreciated     PLAN:  1  Iron panel c/w RONNELL  2  Likely will benefit from IV Iron at one point, but follow H&H and transfuse prn  3  D/C Coumadin, and OK to continue Lovenox for now for new onset AFib - but will hold for now   4  Cardiology cleared pt for E/C  5  Continue bowel prep for procedure tomorrow  6  INR creeping up, will need to make sure it's less than 1 75 prior to procedure tomorrow - ordered     Chief Complaint   Patient presents with    Asthma     Patient presents to the ER stating she was sent from dr [place] office for recurrent asthma symptoms after a course of prednisone and antibiotics  sent for cxr         SUBJECTIVE/HPI  No complaints    /72 (BP Location: Right arm)   Pulse 87   Temp 97 7 °F (36 5 °C) (Oral)   Resp 16   Ht 5' 3" (1 6 m)   Wt 120 kg (263 lb 10 7 oz)   SpO2 96%   BMI 46 71 kg/m²       PHYSICALEXAM    General appearance: alert, appears stated age and cooperative, obese  HEENT: Normocephalic, w/o obvious abnormality, atraumatic, PERRL, EOM's intact  Throat: lips, mucosa, and tongue normal; teeth and gums normal  Neck: no adenopathy, no JVD, supple, symmetrical, trachea midline  Lungs: clear to auscultation bilaterally, No wheeze/rales  Heart: regular rate and rhythm, S1, S2 normal, no murmur  Abdomen: soft, non-tender; bowel sounds normal; exam limited by body habitus  Extremities: extremities normal, atraumatic, no cyanosis or edema  Skin: Skin color, texture, turgor normal  No rashes or lesions  Neurologic: Grossly normal    Lab Results   Component Value Date    GLUCOSE 131 (H) 09/22/2017    CALCIUM 8 4 12/02/2018     09/22/2017    K 4 0 12/02/2018    CO2 32 12/02/2018    CL 90 (L) 12/02/2018    BUN 12 12/02/2018    CREATININE 0 80 12/02/2018     Lab Results   Component Value Date    WBC 12 20 (H) 12/02/2018    HGB 10 3 (L) 12/02/2018    HCT 34 8 12/02/2018    MCV 70 (L) 12/02/2018     (H) 12/02/2018     Lab Results   Component Value Date    ALT 46 12/02/2018    AST 18 12/02/2018    ALKPHOS 128 (H) 12/02/2018    BILITOT 0 4 09/22/2017     Lab Results   Component Value Date    INR 1 61 (H) 12/02/2018     No components found for: [id number]  Lab Results   Component Value Date    LIPASE 64 (L) 02/14/2017     Lab Results   Component Value Date    IRON 13 (L) 12/01/2018    TIBC 512 (H) 12/01/2018    FERRITIN 13 12/01/2018           Gloria Pastrana MD

## 2018-12-02 NOTE — PROGRESS NOTES
Progress Note - Dewey Fiscal 59 y o  female MRN: [id number]  Unit/Bed#: -02 Encounter: [id number]    Assessment:  Principal Problem:    Acute respiratory failure with hypoxia ([address] )  Active Problems:    Benign essential hypertension    Obstructive sleep apnea    Iron deficiency anemia    Severe persistent asthma with acute exacerbation    Tobacco use    Type 2 diabetes mellitus, without long-term current use of insulin (HCC)    Acute congestive heart failure (HCC)    Atrial flutter (HCC)    Abnormal computed tomography angiography (CTA) of abdomen    Abnormal CT of the abdomen    Iron deficiency anemia due to chronic blood loss  Resolved Problems:    Sleep apnea, obstructive    Anemia      Plan:  · New onset atrial flutter  Continue on Cardizem drip and titrate to desired heart rate  Patient was started on oral Cardizem  Cardiology consult appreciated  Echocardiogram   Continue on Lopressor  On therapeutic dose of Lovenox for anticoagulation  Coumadin on hold as patient is scheduled for EGD colonoscopy in a m  Order PT INR in a m  [facility] recommends MITCH cardioversion-possible on Tuesday if needed  · Acute respiratory failure with hypoxia  · Acute on chronic CHF likely diastolic  Oxygen supplementation bronchodilators  Daily weight and I&Os  Continue on Lasix  · Anemia with abnormal CT showing hep flexure density  GI is planning to do EGD and colonoscopy tomorrow  Coumadin was held  Continue on therapeutic dose of Lovenox  · COPD with mild exacerbation  Pulmonary consult noted  On prednisone and bronchodilators  · WILMA,  Morbid obesity-encourage weight loss  Continue CPAP bedtime  · DVT prophylaxis  · Discussed with patient in detail  Subjective:   Patient is seen and examined at bedside  Afebrile  Feels weak and short of breath  Denies any chest pain  All other ROS are negative      Objective:   Vitals: Blood pressure 122/56, pulse 89, temperature 98 2 °F (36 8 °C), temperature source Oral, resp  rate 16, height 5' 3" (1 6 m), weight 120 kg (263 lb 7 2 oz), SpO2 98 %  ,Body mass index is 46 67 kg/m²  SPO2 RA Rest      ED to Hosp-Admission (Current) from 11/30/2018 in [facility]   SpO2  98 %   SpO2 Activity  At Rest   O2 Device  Other (comment)   O2 Flow Rate  2 L/min        I&O:   Intake/Output Summary (Last 24 hours) at 12/02/18 0733  Last data filed at 12/02/18 0401   Gross per 24 hour   Intake           111 42 ml   Output             4350 ml   Net         -4238 58 ml       Physical Exam:    General- Alert, sitting comfortably in bed  Not in any acute distress  HEENT- FRANCISCO, EOM intact  Neck- Supple, No JVD  CVS- irregularly irregular, S1 and S2 normal   Chest- Bilateral Air entry, No rhochi, crackles or wheezing present  Abdomen- soft, nontender, not distended, no guarding or rigidity, BS+  Extremities-  No pedal edema, No calf tenderness                         Normal ROM in all extremities  CNS-   Alert, awake and orientedx3  No focal deficits present      Invasive Devices     Peripheral Intravenous Line            Peripheral IV 11/30/18 Right Wrist 1 day                      Social History  reviewed  Family History   Problem Relation Age of Onset    Alzheimer's disease Mother     Atrial fibrillation Mother     Dementia Mother     Heart disease Mother         heart problem    Seizures Mother     Parkinsonism Father     Arthritis Father     Atrial fibrillation Father     Substance Abuse Neg Hx         mother,father    Mental illness Neg Hx         mother,father    reviewed    Meds:  Current Facility-Administered Medications   Medication Dose Route Frequency Provider Last Rate Last Dose    acetaminophen (TYLENOL) tablet 650 mg  650 mg Oral Q4H PRN VICKY Zelaya        albuterol (PROVENTIL HFA,VENTOLIN HFA) inhaler 2 puff  2 puff Inhalation Q4H PRN VICKY Zelaya        ascorbic acid (VITAMIN C) tablet 1,000 mg  1,000 mg Oral Daily VICKY Rosenberg   1,000 mg at 12/01/18 0819    budesonide (PULMICORT) inhalation solution 0 5 mg  0 5 mg Nebulization Q12H Nathan Adams MD   0 5 mg at 12/01/18 1954    cholecalciferol (VITAMIN D3) tablet 1,000 Units  1,000 Units Oral Daily VICKY Cohen   1,000 Units at 12/01/18 0819    diltiazem (CARDIZEM) 125 mg in sodium chloride 0 9 % 125 mL infusion  1-15 mg/hr Intravenous Titrated VICKY Cohen 12 mL/hr at 12/02/18 0145 12 mg/hr at 12/02/18 0145    diltiazem (CARDIZEM) tablet 60 mg  60 mg Oral [facility] Amber Moss MD   60 mg at 12/02/18 0647    enoxaparin (LOVENOX) subcutaneous injection 120 mg  1 mg/kg Subcutaneous Q12H Kulwant Cristina MD   120 mg at 12/01/18 2259    furosemide (LASIX) injection 40 mg  40 mg Intravenous BID VICKY Rosenberg   40 mg at 12/01/18 1634    guaiFENesin (ROBITUSSIN) oral solution 200 mg  200 mg Oral Q4H PRN VICKY Rosenberg        influenza vaccine, recombinant, quadrivalent (FLUBLOK) IM injection 0 5 mL  0 5 mL Intramuscular Prior to discharge Sarah Robledo MD        insulin lispro (HumaLOG) 100 units/mL subcutaneous injection 2-12 Units  2-12 Units Subcutaneous TID AC Wilbur Norman MD   10 Units at 12/01/18 1635    levalbuterol (XOPENEX) inhalation solution 0 63 mg  0 63 mg Nebulization Q6H PRN VICKY Rosenberg   0 63 mg at 12/01/18 1954    LORazepam (ATIVAN) tablet 1 mg  1 mg Oral HS VICKY Cohen   1 mg at 12/02/18 0143    metoprolol (LOPRESSOR) injection 5 mg  5 mg Intravenous Q6H PRN VICKY Rosenberg   5 mg at 12/01/18 1157    metoprolol succinate (TOPROL-XL) 24 hr tablet 50 mg  50 mg Oral Daily VICKY Cohen   50 mg at 12/01/18 0819    ondansetron (ZOFRAN) injection 4 mg  4 mg Intravenous Q6H PRN VICKY Cohen        pantoprazole (PROTONIX) EC tablet 40 mg  40 mg Oral Early Morning VICKY Cohen   40 mg at 12/02/18 0647    pneumococcal 13-valent conjugate vaccine (PREVNAR-13) IM injection 0 5 mL  0 5 mL Intramuscular Prior to discharge Eden Marmolejo MD        polyethylene glycol (GOLYTELY) bowel prep 4,000 mL  4,000 mL Oral Once Shivam Najera MD        potassium chloride (K-DUR,KLOR-CON) CR tablet 10 mEq  10 mEq Oral BID VICKY Edwards   10 mEq at 12/01/18 1634    predniSONE tablet 40 mg  40 mg Oral Daily Omar Chavis MD        rOPINIRole (REQUIP) tablet 2 mg  2 mg Oral HS VICKY Cohen   2 mg at 12/01/18 2259    traMADol (ULTRAM) tablet 100 mg  100 mg Oral Q12H Jhonny Barrera MD   100 mg at 12/01/18 2047    traZODone (DESYREL) tablet 150 mg  150 mg Oral HS VICKY Cohen   150 mg at 12/01/18 2259      Prescriptions Prior to Admission   Medication    albuterol (PROAIR HFA) 90 mcg/act inhaler    ascorbic acid (VITAMIN C) 1000 MG tablet    Blood Glucose Monitoring Suppl ([employer] CONTOUR NEXT MONITOR) w/Device KIT    Cholecalciferol 1000 units tablet    furosemide (LASIX) 20 mg tablet    LORazepam (ATIVAN) 0 5 mg tablet    metFORMIN (GLUCOPHAGE-XR) 500 mg 24 hr tablet    metoprolol succinate (TOPROL-XL) 50 mg 24 hr tablet    nystatin (MYCOSTATIN) powder    omeprazole (PriLOSEC) 40 MG capsule    potassium chloride (K-DUR) 10 mEq tablet    predniSONE 10 mg tablet    rOPINIRole (REQUIP) 2 mg tablet    traMADol (ULTRAM) 50 mg tablet    traZODone (DESYREL) 150 mg tablet       Labs:    Results from last 7 days  Lab Units 12/02/18  0508 12/01/18  0505 11/30/18  1432   WBC Thousand/uL 12 20* 7 56 9 88   HEMOGLOBIN g/dL 10 3* 10 3* 10 8*   HEMATOCRIT % 34 8 34 9 36 5   PLATELETS Thousands/uL 401* 331 346   NEUTROS PCT %  --   --  71   LYMPHS PCT %  --   --  17   LYMPHO PCT %  --  5*  --    MONOS PCT %  --   --  8   MONO PCT %  --  1*  --    EOS PCT %  --  0 2       Results from last 7 days  Lab Units 12/02/18  0508 12/01/18  0505 11/30/18  1433   POTASSIUM mmol/L 4 0 4 4 3 7   CHLORIDE mmol/L 90* 93* 94*   CO2 mmol/L 32 34* 36*   BUN mg/dL 12 8 7   CREATININE mg/dL 0 80 0 74 0 73   CALCIUM mg/dL 8 4 8 1* 8 5   ALK PHOS U/L 128* 130* 138*   ALT U/L 46 51 59   AST U/L 18 23 32     Lab Results   Component Value Date    TROPONINI <0 02 12/01/2018    TROPONINI <0 02 11/30/2018    TROPONINI <0 02 11/30/2018    CKTOTAL 39 09/07/2017       Results from last 7 days  Lab Units 12/02/18  0508 12/01/18  0505 11/30/18  1433   INR  1 61* 1 27* 1 22*     No results found for: Shahida Daron, SPUTUMCULTUR      Imaging:  Results for orders placed during the hospital encounter of 09/07/17   XR chest 1 view portable    Narrative CHEST     INDICATION:  weakness  History taken directly from the electronic ordering system  COMPARISON:  Chest x-ray performed on 12/19/2016    VIEWS:   AP frontal;  1 image    FINDINGS:         Mild cardiomegaly  No focal consolidation  No pleural effusion or pneumothorax  Visualized osseous structures appear within normal limits for the patient's age  Impression No active pulmonary disease  Workstation performed: [id number]       Results for orders placed during the hospital encounter of 12/19/16   XR chest pa & lateral    Narrative CHEST - DUAL ENERGY    INDICATION: Cough with shortness of breath and mid chest pain x2 months  COMPARISON: 11/10/2016, 11/18/2016  VIEWS:  PA (including soft tissue/bone algorithms) and lateral projections; 4 images    FINDINGS:    The cardiomediastinal silhouette is unremarkable  No acute infiltrates  Persistent linear opacities in the lingula consistent with atelectasis or scarring  No pleural effusions  Visualized osseous structures appear within normal limits for patient's age  Impression No acute pulmonary disease  Workstation performed: [id number]         Labs & Imaging: I have personally reviewed pertinent reports        VTE Pharmacologic Prophylaxis: Enoxaparin (Lovenox)  VTE Mechanical Prophylaxis: sequential compression device    Code Status:   Level 1 - Full Code      "This note has been constructed using a voice recognition system"      Hiram King MD  12/2/2018,7:33 AM

## 2018-12-02 NOTE — CONSULTS
Consultation - Cardiology   Suzy Srinviasan 59 y o  female MRN: [id number]  Unit/Bed#: -02 Encounter: [id number]  12/01/18  7:09 PM      Assessment:  Principal Problem:    Acute respiratory failure with hypoxia ([facility] )  Active Problems:    Benign essential hypertension    Obstructive sleep apnea    Iron deficiency anemia    Severe persistent asthma with acute exacerbation    Tobacco use    Type 2 diabetes mellitus, without long-term current use of insulin (HCC)    Acute congestive heart failure (HCC)    Atrial flutter (HCC)    Abnormal computed tomography angiography (CTA) of abdomen    Abnormal CT of the abdomen    Iron deficiency anemia due to chronic blood loss    Chief Complaint   Patient presents with    Asthma     Patient presents to the ER stating she was sent from dr Jeronimo [place] office for recurrent asthma symptoms after a course of prednisone and antibiotics  sent for cxr  Admitting diagnosis:  Diabetes mellitus ([place] [address] ) [E11 9]  Chronic obstructive asthma ([place] [address] ) [J44 9]  Asthma [J45 909]  Tobacco use [Z72 0]  Atrial fibrillation, unspecified type ([place] [address] ) [I48 91]      Impression:    68-year-old presenting with acute respiratory failure-predominantly upper airway disease, also with new onset atrial flutter with rapid rates-currently on a Cardizem drip with modest control only, with lower extremity edema but no pulmonary edema on CT  CT also suggested pulmonary artery enlargement with concern for pulmonary hypertension  CT was negative for PE  Plan:    Atrial flutter:  Currently on a Cardizem drip  She did receive Cardizem boluses prior to initiation of the drip  Currently without good control, will not use beta-blocker due to wheezing, start p o  Cardizem at 60 mg 3 times daily, can be increased further tomorrow  If without response, start digoxin with load tomorrow morning    Would benefit from MITCH-cardioversion  - -provided GI clearance is obtained-new anemia and is scheduled for EGD and colonoscopy on Monday  TSH was normal, echo with preserved LV function  She is on therapeutic anticoagulation with Lovenox at this time  Anemia:  Baseline hemoglobin around 13, here around 10 , concern for hepatic flexure density-possibly just collapsed bowel, will be undergoing a colonoscopy and endoscopy on Monday, await completion of EGD and colonoscopy prior to proceeding with MITCH-cardioversion    Lower extremity edema:  Might be a component of right-sided congestive heart failure, Lasix-extra 40 mg IV now, continue 40 IV twice daily  Pulmonary artery enlargement on CT:  I reviewed her echocardiogram from the floor  I do not see any signs of right ventricular overload or enlargement  Pulmonary pressures while suboptimally measured, but not elevated  She had does admit to using weight loss drugs about 40 years ago but could not recall what they were  Once her acute issues have resolved, a repeat limited echo to re-evaluate pulmonary pressures would suffice  Smoking cessation        History of Present Illness   Physician Requesting Consult: Minal Robbins MD   Reason for Consult / Principal Problem:  Atrial flutter  HPI: Raymundo Ko is a 59y o  year old female   Without a prior history of cardiac arrhythmias, carries a diagnosis of congestive heart failure at the age of 34 but no exacerbation since then, at baseline on Lasix 40 mg twice daily, chronic and ongoing tobacco use, recent asthma/upper airway exacerbation-was on a course of steroids, readmitted for acute respiratory failure with hypoxia with CT suggesting bronchiolitis/hypersensitive pneumonitis as well as pulmonary artery enlargement with concern for pulmonary hypertension  There was no pulmonary embolism on CT  ProBNP was elevated mildly at 692  She was in atrial flutter at the time of presentation with rapid rates, currently on a Cardizem drip with modest control only      On exam, continues to wheeze, has lower extremity edema, it morbidly obese and has respiratory distress  Inpatient consult to Cardiology  Consult performed by: Ariela Pryor ordered by: Rayne Severe          Review of Systems:  feels ill and fatigued  Review of Systems   Constitutional: Positive for activity change and appetite change  Negative for chills, diaphoresis, fatigue, fever and unexpected weight change  HENT: Negative  Eyes: Negative  Respiratory: Positive for cough, shortness of breath and wheezing  Negative for apnea, choking and chest tightness  Cardiovascular: Positive for leg swelling  Negative for chest pain and palpitations  Gastrointestinal: Negative  Endocrine: Negative  Genitourinary: Negative  Musculoskeletal: Negative  Skin: Negative  Allergic/Immunologic: Negative  Neurological: Negative  Hematological: Negative  Psychiatric/Behavioral: Positive for dysphoric mood  Negative for agitation, behavioral problems, confusion, decreased concentration, hallucinations and self-injury  The patient is not nervous/anxious and is not hyperactive          Historical Information   Past Medical History:   Diagnosis Date    Anemia     Cervical radiculopathy     CHF (congestive heart failure) (HCC)     Chronic low back pain     Chronic obstructive asthma ([facility] ) 2/20/2018    Community acquired pneumonia     Diabetes mellitus ([facility] )     Diabetes mellitus with hyperglycemia (HCC)     Elevated liver enzymes     GERD (gastroesophageal reflux disease)     Paresthesia of upper extremity     Plantar fasciitis     Restless leg     Sexual dysfunction     Sleep apnea, obstructive     Tenosynovitis of finger     Tinea corporis     Weakness of upper extremity      Past Surgical History:   Procedure Laterality Date    ABDOMINAL SURGERY      CARPAL TUNNEL RELEASE Bilateral     CHOLECYSTECTOMY      laparoscopic    GASTRIC BYPASS      for morbid obesity with gilda en y   [address] EXCIS PRIMARY GANGLION WRIST Left 12/14/2017    Procedure: LONG FINGER GANGLION CYST EXCISION;  Surgeon: Elsy Flores MD;  Location: [facility];  Service: Orthopedics    CT INCISE FINGER TENDON SHEATH Left 12/14/2017    Procedure: Mari Joey, RING, TRIGGER FINGER RELEASE;  Surgeon: Elsy Flores MD;  Location: [facility];  Service: Orthopedics    SHOULDER SURGERY       History   Alcohol Use    Yes     Comment: about 6 [employer] light every night     History   Drug Use No     History   Smoking Status    Current Every Day Smoker    Packs/day: 0 25    Years: 9 00    Types: Cigarettes   Smokeless Tobacco    Never Used     Comment: hasn't smoked for a few days d/t being sick     Family History:   Family History   Problem Relation Age of Onset    Alzheimer's disease Mother     Atrial fibrillation Mother     Dementia Mother     Heart disease Mother         heart problem    Seizures Mother     Parkinsonism Father     Arthritis Father     Atrial fibrillation Father     Substance Abuse Neg Hx         mother,father    Mental illness Neg Hx         mother,father     No family history of premature CAD or Sudden Cardiac Death    Meds/Allergies     Current Facility-Administered Medications:     acetaminophen (TYLENOL) tablet 650 mg, 650 mg, Oral, Q4H PRN, VICKY Rosenberg    albuterol (PROVENTIL HFA,VENTOLIN HFA) inhaler 2 puff, 2 puff, Inhalation, Q4H PRN, VICKY Rosenberg    ascorbic acid (VITAMIN C) tablet 1,000 mg, 1,000 mg, Oral, Daily, VICKY Cohen, 1,000 mg at 12/01/18 0819    budesonide (PULMICORT) inhalation solution 0 5 mg, 0 5 mg, Nebulization, Q12H, Angela Alcantar MD    cholecalciferol (VITAMIN D3) tablet 1,000 Units, 1,000 Units, Oral, Daily, VICKY Rosenberg, 1,000 Units at 12/01/18 0819    diltiazem (CARDIZEM) 125 mg in sodium chloride 0 9 % 125 mL infusion, 1-15 mg/hr, Intravenous, Titrated, VICKY Cohen, Last Rate: 15 mL/hr at 12/01/18 1600, 15 mg/hr at 12/01/18 1600    enoxaparin (LOVENOX) subcutaneous injection 120 mg, 1 mg/kg, Subcutaneous, Q12H [address], Mark Holbrook MD, 120 mg at 12/01/18 1150    furosemide (LASIX) injection 40 mg, 40 mg, Intravenous, BID, VICKY Cohen, 40 mg at 12/01/18 1634    guaiFENesin (ROBITUSSIN) oral solution 200 mg, 200 mg, Oral, Q4H PRN, VICKY Galvan    influenza vaccine, recombinant, quadrivalent (FLUBLOK) IM injection 0 5 mL, 0 5 mL, Intramuscular, Prior to discharge, Ant Serrano MD    insulin lispro (HumaLOG) 100 units/mL subcutaneous injection 2-12 Units, 2-12 Units, Subcutaneous, TID AC, 10 Units at 12/01/18 1635 **AND** Fingerstick Glucose (POCT), , , TID AC, Mark Holbrook MD    levalbuterol (XOPENEX) inhalation solution 0 63 mg, 0 63 mg, Nebulization, Q6H PRN, VICKY Galvan, 0 63 mg at 12/01/18 0907    methylPREDNISolone sodium succinate (Solu-MEDROL) injection 40 mg, 40 mg, Intravenous, Q12H [address], Edward Urias MD    metoprolol (LOPRESSOR) injection 5 mg, 5 mg, Intravenous, Q6H PRN, VICKY Heller, 5 mg at 12/01/18 1157    metoprolol succinate (TOPROL-XL) 24 hr tablet 50 mg, 50 mg, Oral, Daily, VICKY Cohen, 50 mg at 12/01/18 0819    ondansetron (ZOFRAN) injection 4 mg, 4 mg, Intravenous, Q6H PRN, VICKY Galvan    pantoprazole (PROTONIX) EC tablet 40 mg, 40 mg, Oral, Early Morning, VICYK Cohen, 40 mg at 12/01/18 1986    pneumococcal 13-valent conjugate vaccine (PREVNAR-13) IM injection 0 5 mL, 0 5 mL, Intramuscular, Prior to discharge, Ant Serrano MD    potassium chloride (K-DUR,KLOR-CON) CR tablet 10 mEq, 10 mEq, Oral, BID, VICKY Cohen, 10 mEq at 12/01/18 1634    [START ON 12/2/2018] predniSONE tablet 40 mg, 40 mg, Oral, Daily, Edward Urias MD    rOPINIRole (REQUIP) tablet 2 mg, 2 mg, Oral, HS, VICKY Cohen, 2 mg at 11/30/18 2244    traMADol (ULTRAM) tablet 100 mg, 100 mg, Oral, Q12H, Luis Alberto JACKSON Armando Tang MD, 100 mg at 12/01/18 0912    traZODone (DESYREL) tablet 150 mg, 150 mg, Oral, HS, VICKY Cohen, 150 mg at 11/30/18 7191  Allergies   Allergen Reactions    Iron Dextran Swelling    Januvia [Sitagliptin] Shortness Of Breath    Jardiance [Empagliflozin] Shortness Of Breath    Clonazepam Other (See Comments)     Unknown reaction    Codeine Itching and Other (See Comments)     itch;mary kay morphine,takes ultram @home    Lactose     Oxycodone-Acetaminophen      Other reaction(s): Other (See Comments)  (PERCOCET) MILD RASH    Oxycodone-Acetaminophen Anxiety       Objective   Vitals: Blood pressure 114/72, pulse 87, temperature (!) 97 3 °F (36 3 °C), temperature source Oral, resp  rate 18, height 5' 3" (1 6 m), weight 120 kg (263 lb 7 2 oz), SpO2 96 %  , Body mass index is 46 67 kg/m² , Orthostatic Blood Pressures      Most Recent Value   Blood Pressure  114/72 filed at 12/01/2018 1459   Patient Position - Orthostatic VS  Sitting filed at 12/01/2018 0714            Intake/Output Summary (Last 24 hours) at 12/01/18 1909  Last data filed at 12/01/18 1745   Gross per 24 hour   Intake           111 42 ml   Output             1950 ml   Net         -1838 58 ml       Weight (last 2 days)     Date/Time   Weight    12/01/18 1105  120 (263 45)    11/30/18 1810  121 (267)    11/30/18 1412  121 (267)              Invasive Devices     Peripheral Intravenous Line            Peripheral IV 11/30/18 Right Wrist less than 1 day                  Physical Exam:  GEN: Toya West appears well, alert and oriented x 3, pleasant and cooperative , obese  HEENT: pupils equal, round, and reactive to light; extraocular muscles intact  NECK: supple, no carotid bruits or JVD  HEART: irregular rhythm, normal S1 and S2, no murmurs, no clicks, gallops or rubs   LUNGS: clear to auscultation bilaterally; bilateral wheezes, no rhonchi, no rales  ABDOMEN/GI: normal bowel sounds, soft, no tenderness, no distention  EXTREMITIES/Musculoskeltal: peripheral pulses normal; no clubbing, cyanosis, or edema  NEURO: no focal motor findings   SKIN: normal without suspicious lesions on exposed skin      Lab Results:   Admission on 11/30/2018   Component Date Value    WBC 11/30/2018 9 88     RBC 11/30/2018 5 22*    Hemoglobin 11/30/2018 10 8*    Hematocrit 11/30/2018 36 5     MCV 11/30/2018 70*    MCH 11/30/2018 20 7*    MCHC 11/30/2018 29 6*    RDW 11/30/2018 19 8*    MPV 11/30/2018 10 3     Platelets 62/53/8775 346     nRBC 11/30/2018 0     Neutrophils Relative 11/30/2018 71     Immat GRANS % 11/30/2018 1     Lymphocytes Relative 11/30/2018 17     Monocytes Relative 11/30/2018 8     Eosinophils Relative 11/30/2018 2     Basophils Relative 11/30/2018 1     Neutrophils Absolute 11/30/2018 7 08     Immature Grans Absolute 11/30/2018 0 06     Lymphocytes Absolute 11/30/2018 1 64     Monocytes Absolute 11/30/2018 0 83     Eosinophils Absolute 11/30/2018 0 21     Basophils Absolute 11/30/2018 0 06     Protime 11/30/2018 14 7*    INR 11/30/2018 1 22*    PTT 11/30/2018 29     Sodium 11/30/2018 136     Potassium 11/30/2018 3 7     Chloride 11/30/2018 94*    CO2 11/30/2018 36*    ANION GAP 11/30/2018 6     BUN 11/30/2018 7     Creatinine 11/30/2018 0 73     Glucose 11/30/2018 187*    Calcium 11/30/2018 8 5     AST 11/30/2018 32     ALT 11/30/2018 59     Alkaline Phosphatase 11/30/2018 138*    Total Protein 11/30/2018 6 5     Albumin 11/30/2018 3 4*    Total Bilirubin 11/30/2018 0 40     eGFR 11/30/2018 90     Magnesium 11/30/2018 1 8     Troponin I 11/30/2018 <0 02     NT-proBNP 11/30/2018 692*    TSH 3RD GENERATON 11/30/2018 3  257     Troponin I 11/30/2018 <0 02     Troponin I 12/01/2018 <0 02     Magnesium 11/30/2018 1 9     POC Glucose 11/30/2018 214*    Total Bilirubin 12/01/2018 0 40     Bilirubin, Direct 12/01/2018 0 22*    Alkaline Phosphatase 12/01/2018 130*    AST 12/01/2018 23     ALT 12/01/2018 51     Total Protein 12/01/2018 6 4     Albumin 12/01/2018 3 2*    WBC 12/01/2018 7 56     RBC 12/01/2018 5 02     Hemoglobin 12/01/2018 10 3*    Hematocrit 12/01/2018 34 9     MCV 12/01/2018 70*    MCH 12/01/2018 20 5*    MCHC 12/01/2018 29 5*    RDW 12/01/2018 19 5*    MPV 12/01/2018 10 6     Platelets 47/32/4008 331     nRBC 12/01/2018 0     Sodium 12/01/2018 134*    Potassium 12/01/2018 4 4     Chloride 12/01/2018 93*    CO2 12/01/2018 34*    ANION GAP 12/01/2018 7     BUN 12/01/2018 8     Creatinine 12/01/2018 0 74     Glucose 12/01/2018 298*    Calcium 12/01/2018 8 1*    eGFR 12/01/2018 89     Cholesterol 12/01/2018 137     Triglycerides 12/01/2018 54     HDL, Direct 12/01/2018 65*    LDL Calculated 12/01/2018 61     Protime 12/01/2018 15 2*    INR 12/01/2018 1 27*    Segmented % 12/01/2018 94*    Lymphocytes % 12/01/2018 5*    Monocytes % 12/01/2018 1*    Eosinophils, % 12/01/2018 0     Basophils % 12/01/2018 0     Absolute Neutrophils 12/01/2018 7 11     Lymphocytes Absolute 12/01/2018 0 38*    Monocytes Absolute 12/01/2018 0 08     Eosinophils Absolute 12/01/2018 0 00     Basophils Absolute 12/01/2018 0 00     Total Counted 12/01/2018 100     RBC Morphology 12/01/2018 Present     Anisocytosis 12/01/2018 Present     Hypochromia 12/01/2018 Present     Microcytes 12/01/2018 Present     Ovalocytes 12/01/2018 Present     Polychromasia 12/01/2018 Present     Target Cells 12/01/2018 Present     Platelet Estimate 77/74/8259 Adequate     POC Glucose 12/01/2018 309*    POC Glucose 12/01/2018 322*    POC Glucose 12/01/2018 364*         Imaging: I have personally reviewed pertinent reports  EKG/Telemetry: No events on telemetry      Counseling / Coordination of Care  Total floor / unit time spent today 40 minutes  Greater than 50% of total time was spent with the patient and / or family counseling and / or coordination of care  We will continue to follow with the patient throughout their hospitalization  Thank you for allowing us to participate in their care     Brittany Lynn MD

## 2018-12-02 NOTE — PROGRESS NOTES
Reviewed diabetes diet education with patient, including carb containing foods, carb serving amounts, discussed balanced meal planning, and label reading  Also provided her with information on [facility] program and our Back to Basics classes  They discuss behavioral health and nutrition to assist patients who are pre-surgery to lose weight and maintain weight loss  Once able, advance po diet back to consistent carb level 1  Will continue to monitor during hospital stay

## 2018-12-03 ENCOUNTER — ANESTHESIA (INPATIENT)
Dept: PERIOP | Facility: HOSPITAL | Age: 58
DRG: 194 | End: 2018-12-03
Payer: COMMERCIAL

## 2018-12-03 ENCOUNTER — ANESTHESIA EVENT (INPATIENT)
Dept: PERIOP | Facility: HOSPITAL | Age: 58
DRG: 194 | End: 2018-12-03
Payer: COMMERCIAL

## 2018-12-03 LAB
ANION GAP SERPL CALCULATED.3IONS-SCNC: 7 MMOL/L (ref 4–13)
BASOPHILS # BLD AUTO: 0.01 THOUSANDS/ΜL (ref 0–0.1)
BASOPHILS NFR BLD AUTO: 0 % (ref 0–1)
BUN SERPL-MCNC: 8 MG/DL (ref 5–25)
CALCIUM SERPL-MCNC: 8.3 MG/DL (ref 8.3–10.1)
CHLORIDE SERPL-SCNC: 90 MMOL/L (ref 100–108)
CO2 SERPL-SCNC: 37 MMOL/L (ref 21–32)
CREAT SERPL-MCNC: 0.64 MG/DL (ref 0.6–1.3)
EOSINOPHIL # BLD AUTO: 0.16 THOUSAND/ΜL (ref 0–0.61)
EOSINOPHIL NFR BLD AUTO: 1 % (ref 0–6)
ERYTHROCYTE [DISTWIDTH] IN BLOOD BY AUTOMATED COUNT: 19.7 % (ref 11.6–15.1)
GFR SERPL CREATININE-BSD FRML MDRD: 98 ML/MIN/1.73SQ M
GLUCOSE SERPL-MCNC: 163 MG/DL (ref 65–140)
GLUCOSE SERPL-MCNC: 173 MG/DL (ref 65–140)
GLUCOSE SERPL-MCNC: 204 MG/DL (ref 65–140)
GLUCOSE SERPL-MCNC: 245 MG/DL (ref 65–140)
GLUCOSE SERPL-MCNC: 318 MG/DL (ref 65–140)
HCT VFR BLD AUTO: 34.7 % (ref 34.8–46.1)
HGB BLD-MCNC: 10.5 G/DL (ref 11.5–15.4)
IMM GRANULOCYTES # BLD AUTO: 0.08 THOUSAND/UL (ref 0–0.2)
IMM GRANULOCYTES NFR BLD AUTO: 1 % (ref 0–2)
INR PPP: 1.48 (ref 0.86–1.17)
LYMPHOCYTES # BLD AUTO: 2.16 THOUSANDS/ΜL (ref 0.6–4.47)
LYMPHOCYTES NFR BLD AUTO: 19 % (ref 14–44)
MCH RBC QN AUTO: 21.1 PG (ref 26.8–34.3)
MCHC RBC AUTO-ENTMCNC: 30.3 G/DL (ref 31.4–37.4)
MCV RBC AUTO: 70 FL (ref 82–98)
MONOCYTES # BLD AUTO: 0.86 THOUSAND/ΜL (ref 0.17–1.22)
MONOCYTES NFR BLD AUTO: 8 % (ref 4–12)
NEUTROPHILS # BLD AUTO: 8.08 THOUSANDS/ΜL (ref 1.85–7.62)
NEUTS SEG NFR BLD AUTO: 71 % (ref 43–75)
NRBC BLD AUTO-RTO: 0 /100 WBCS
PLATELET # BLD AUTO: 352 THOUSANDS/UL (ref 149–390)
PMV BLD AUTO: 10.6 FL (ref 8.9–12.7)
POTASSIUM SERPL-SCNC: 3.2 MMOL/L (ref 3.5–5.3)
PROTHROMBIN TIME: 17.1 SECONDS (ref 11.8–14.2)
RBC # BLD AUTO: 4.98 MILLION/UL (ref 3.81–5.12)
SODIUM SERPL-SCNC: 134 MMOL/L (ref 136–145)
WBC # BLD AUTO: 11.35 THOUSAND/UL (ref 4.31–10.16)

## 2018-12-03 PROCEDURE — 88312 SPECIAL STAINS GROUP 1: CPT | Performed by: PATHOLOGY

## 2018-12-03 PROCEDURE — 94760 N-INVAS EAR/PLS OXIMETRY 1: CPT

## 2018-12-03 PROCEDURE — 88305 TISSUE EXAM BY PATHOLOGIST: CPT | Performed by: PATHOLOGY

## 2018-12-03 PROCEDURE — 85610 PROTHROMBIN TIME: CPT | Performed by: INTERNAL MEDICINE

## 2018-12-03 PROCEDURE — 99233 SBSQ HOSP IP/OBS HIGH 50: CPT | Performed by: INTERNAL MEDICINE

## 2018-12-03 PROCEDURE — 94660 CPAP INITIATION&MGMT: CPT

## 2018-12-03 PROCEDURE — 0DB58ZX EXCISION OF ESOPHAGUS, VIA NATURAL OR ARTIFICIAL OPENING ENDOSCOPIC, DIAGNOSTIC: ICD-10-PCS | Performed by: INTERNAL MEDICINE

## 2018-12-03 PROCEDURE — 85025 COMPLETE CBC W/AUTO DIFF WBC: CPT | Performed by: INTERNAL MEDICINE

## 2018-12-03 PROCEDURE — 94640 AIRWAY INHALATION TREATMENT: CPT

## 2018-12-03 PROCEDURE — 80048 BASIC METABOLIC PNL TOTAL CA: CPT | Performed by: INTERNAL MEDICINE

## 2018-12-03 PROCEDURE — 94150 VITAL CAPACITY TEST: CPT

## 2018-12-03 PROCEDURE — 0DBH8ZZ EXCISION OF CECUM, VIA NATURAL OR ARTIFICIAL OPENING ENDOSCOPIC: ICD-10-PCS | Performed by: INTERNAL MEDICINE

## 2018-12-03 PROCEDURE — 82948 REAGENT STRIP/BLOOD GLUCOSE: CPT

## 2018-12-03 PROCEDURE — 99232 SBSQ HOSP IP/OBS MODERATE 35: CPT | Performed by: INTERNAL MEDICINE

## 2018-12-03 RX ORDER — LEVALBUTEROL INHALATION SOLUTION 0.63 MG/3ML
0.63 SOLUTION RESPIRATORY (INHALATION) ONCE
Status: COMPLETED | OUTPATIENT
Start: 2018-12-03 | End: 2018-12-03

## 2018-12-03 RX ORDER — SODIUM CHLORIDE 9 MG/ML
INJECTION, SOLUTION INTRAVENOUS CONTINUOUS PRN
Status: DISCONTINUED | OUTPATIENT
Start: 2018-12-03 | End: 2018-12-03 | Stop reason: SURG

## 2018-12-03 RX ORDER — ONDANSETRON 2 MG/ML
4 INJECTION INTRAMUSCULAR; INTRAVENOUS ONCE AS NEEDED
Status: DISCONTINUED | OUTPATIENT
Start: 2018-12-03 | End: 2018-12-07 | Stop reason: HOSPADM

## 2018-12-03 RX ORDER — FENTANYL CITRATE/PF 50 MCG/ML
25 SYRINGE (ML) INJECTION
Status: DISCONTINUED | OUTPATIENT
Start: 2018-12-03 | End: 2018-12-07 | Stop reason: HOSPADM

## 2018-12-03 RX ORDER — FLUTICASONE FUROATE AND VILANTEROL 100; 25 UG/1; UG/1
1 POWDER RESPIRATORY (INHALATION) DAILY
Status: DISCONTINUED | OUTPATIENT
Start: 2018-12-03 | End: 2018-12-07 | Stop reason: HOSPADM

## 2018-12-03 RX ORDER — PROPOFOL 10 MG/ML
INJECTION, EMULSION INTRAVENOUS AS NEEDED
Status: DISCONTINUED | OUTPATIENT
Start: 2018-12-03 | End: 2018-12-03 | Stop reason: SURG

## 2018-12-03 RX ADMIN — LEVALBUTEROL HYDROCHLORIDE 0.63 MG: 0.63 SOLUTION RESPIRATORY (INHALATION) at 13:32

## 2018-12-03 RX ADMIN — TRAZODONE HYDROCHLORIDE 150 MG: 150 TABLET ORAL at 22:08

## 2018-12-03 RX ADMIN — FUROSEMIDE 40 MG: 10 INJECTION, SOLUTION INTRAVENOUS at 08:33

## 2018-12-03 RX ADMIN — PROPOFOL 50 MG: 10 INJECTION, EMULSION INTRAVENOUS at 14:19

## 2018-12-03 RX ADMIN — POTASSIUM CHLORIDE 10 MEQ: 750 TABLET, EXTENDED RELEASE ORAL at 17:05

## 2018-12-03 RX ADMIN — LEVALBUTEROL HYDROCHLORIDE 0.63 MG: 0.63 SOLUTION RESPIRATORY (INHALATION) at 19:18

## 2018-12-03 RX ADMIN — DILTIAZEM HYDROCHLORIDE 60 MG: 60 TABLET, FILM COATED ORAL at 22:08

## 2018-12-03 RX ADMIN — PROPOFOL 20 MG: 10 INJECTION, EMULSION INTRAVENOUS at 14:03

## 2018-12-03 RX ADMIN — VITAMIN D, TAB 1000IU (100/BT) 1000 UNITS: 25 TAB at 08:33

## 2018-12-03 RX ADMIN — LORAZEPAM 1 MG: 1 TABLET ORAL at 22:08

## 2018-12-03 RX ADMIN — INSULIN LISPRO 3 UNITS: 100 INJECTION, SOLUTION INTRAVENOUS; SUBCUTANEOUS at 22:16

## 2018-12-03 RX ADMIN — PROPOFOL 20 MG: 10 INJECTION, EMULSION INTRAVENOUS at 13:57

## 2018-12-03 RX ADMIN — METOPROLOL SUCCINATE 50 MG: 50 TABLET, EXTENDED RELEASE ORAL at 08:33

## 2018-12-03 RX ADMIN — INSULIN LISPRO 2 UNITS: 100 INJECTION, SOLUTION INTRAVENOUS; SUBCUTANEOUS at 06:20

## 2018-12-03 RX ADMIN — FUROSEMIDE 40 MG: 10 INJECTION, SOLUTION INTRAVENOUS at 17:05

## 2018-12-03 RX ADMIN — PANTOPRAZOLE SODIUM 40 MG: 40 TABLET, DELAYED RELEASE ORAL at 05:54

## 2018-12-03 RX ADMIN — OXYCODONE HYDROCHLORIDE AND ACETAMINOPHEN 1000 MG: 500 TABLET ORAL at 08:33

## 2018-12-03 RX ADMIN — DILTIAZEM HYDROCHLORIDE 60 MG: 60 TABLET, FILM COATED ORAL at 05:54

## 2018-12-03 RX ADMIN — INSULIN LISPRO 4 UNITS: 100 INJECTION, SOLUTION INTRAVENOUS; SUBCUTANEOUS at 17:06

## 2018-12-03 RX ADMIN — BUDESONIDE 0.5 MG: 0.5 INHALANT RESPIRATORY (INHALATION) at 07:22

## 2018-12-03 RX ADMIN — PREDNISONE 40 MG: 20 TABLET ORAL at 08:34

## 2018-12-03 RX ADMIN — PROPOFOL 60 MG: 10 INJECTION, EMULSION INTRAVENOUS at 14:04

## 2018-12-03 RX ADMIN — PROPOFOL 50 MG: 10 INJECTION, EMULSION INTRAVENOUS at 14:12

## 2018-12-03 RX ADMIN — TRAMADOL HYDROCHLORIDE 100 MG: 50 TABLET, COATED ORAL at 08:34

## 2018-12-03 RX ADMIN — SODIUM CHLORIDE: 0.9 INJECTION, SOLUTION INTRAVENOUS at 13:47

## 2018-12-03 RX ADMIN — TRAMADOL HYDROCHLORIDE 100 MG: 50 TABLET, COATED ORAL at 21:00

## 2018-12-03 RX ADMIN — POTASSIUM CHLORIDE 10 MEQ: 750 TABLET, EXTENDED RELEASE ORAL at 08:33

## 2018-12-03 RX ADMIN — FLUTICASONE FUROATE AND VILANTEROL TRIFENATATE 1 PUFF: 100; 25 POWDER RESPIRATORY (INHALATION) at 10:46

## 2018-12-03 RX ADMIN — PROPOFOL 50 MG: 10 INJECTION, EMULSION INTRAVENOUS at 13:54

## 2018-12-03 RX ADMIN — DILTIAZEM HYDROCHLORIDE 60 MG: 60 TABLET, FILM COATED ORAL at 15:52

## 2018-12-03 RX ADMIN — ENOXAPARIN SODIUM 120 MG: 150 INJECTION SUBCUTANEOUS at 21:00

## 2018-12-03 RX ADMIN — PROPOFOL 50 MG: 10 INJECTION, EMULSION INTRAVENOUS at 13:51

## 2018-12-03 RX ADMIN — LEVALBUTEROL HYDROCHLORIDE 0.63 MG: 0.63 SOLUTION RESPIRATORY (INHALATION) at 07:22

## 2018-12-03 RX ADMIN — ROPINIROLE 2 MG: 2 TABLET, FILM COATED ORAL at 22:08

## 2018-12-03 NOTE — ANESTHESIA PREPROCEDURE EVALUATION
Review of Systems/Medical History  Patient summary reviewed  Chart reviewed      Cardiovascular  Hyperlipidemia, Hypertension controlled, Dysrhythmias , atrial flutter, CHF ,   Comment: Admitted with acuter hypoxic respiratory failure, Aflutter, chf and exacerbation of asthma,  Pulmonary  Smoker cigarette smoker  , Pneumonia, Asthma , Sleep apnea ,        GI/Hepatic    GERD , Bariatric surgery,   Comment: Increased lft's  6 pack a day etoh     Negative  ROS        Endo/Other  Diabetes poorly controlled type 2 ,      GYN  Negative gynecology ROS          Hematology  Anemia ,     Musculoskeletal    Comment: Cervical radiculopathy with parasthesias and weakness upper extremities  Arthritis     Neurology    Paresthesias,    Psychology   Anxiety,              Physical Exam    Airway    Mallampati score: II  TM Distance: <3 FB  Neck ROM: limited     Dental       Cardiovascular  Rhythm: regular, Rate: normal, Cardiovascular exam normal    Pulmonary  Decreased breath sounds,     Other Findings        Anesthesia Plan  ASA Score- 3     Anesthesia Type- IV sedation with anesthesia with ASA Monitors  Additional Monitors:   Airway Plan:         Plan Factors-    Induction- intravenous  Postoperative Plan-     Informed Consent- Anesthetic plan and risks discussed with patient  I personally reviewed this patient with the CRNA  Discussed and agreed on the Anesthesia Plan with the CRNA  Karrie Nissen

## 2018-12-03 NOTE — ASSESSMENT & PLAN NOTE
Lab Results   Component Value Date    HGBA1C 6 8 (H) 09/22/2017       Recent Labs      12/02/18   1125  12/02/18   1639  12/02/18   2134  12/03/18   0553   POCGLU  284*  301*  223*  173*       Blood Sugar Average: Last 72 hrs:  (P) 275 6     Blood sugars were uncontrolled on IV Solu-Medrol and now improving of p o   Prednisone, will continue sliding scale Humalog before EGD and colonoscopy

## 2018-12-03 NOTE — ANESTHESIA POSTPROCEDURE EVALUATION
Post-Op Assessment Note      CV Status:  Stable    Mental Status:  Alert and awake    Hydration Status:  Euvolemic    PONV Controlled:  None    Airway Patency:  Patent    Post Op Vitals Reviewed: Yes          Staff: CRNA, Anesthesiologist           BP      Temp      Pulse     Resp      SpO2

## 2018-12-03 NOTE — DISCHARGE INSTR - AVS FIRST PAGE
ESOPHAGOGASTRODUODENOSCOPY(EGD) and COLONOSCOPY    SEDATION: Monitored anesthesia care, check anesthesia records    ASA Class: 3    INDICATIONS: Iron Deficiency Anemia, abnormal abdominal CT @ hepatic flexure    CONSENT:  Informed consent was obtained for the procedure, including sedation after explaining the risks and benefits of the procedure  Risks including but not limited to bleeding, perforation, infection, and missed lesion  PREPARATION:   Telemetry, pulse oximetry, blood pressure were monitored throughout the procedure  Patient was identified by myself both verbally and by visual inspection of ID band  PROCEDURE: EGD    DESCRIPTION:   Patient was placed in the left lateral decubitus position and was sedated with the above medication  The gastroscope was introduced in to the oropharynx and the esophagus was intubated under direct visualization  Scope was passed down the esophagus up to efferent and afferent limbs of the small bowel bypassed regions  A careful inspection was made as the gastroscope was withdrawn, including a retroflexed view of the stomach; findings and interventions are described below  SPECIMENS TAKEN:      Esophageal biopsies rule out EOE and Candida     ESTIMATED BLOOD LOSS:      Minimal      FINDINGS:    #1  Esophagus- White plaques in the esophagus diffusely, unclear if this is dry mucus or candida  Biopsies taken  Small hiatal hernia  #2  Stomach- remnant pouch to the anastomosis without any abnormalities  Normal mucosa @ the anastomosis  #3  Jejunum- normal mucosa noted on the efferent and afferent limbs of the small bowel distal to the anastomosis  PROCEDURE: COLONOSCOPY    DESCRIPTION:     Prior colonoscopy: 9 years ago  Informed consent was obtained for the procedure, including sedation  Risks of perforation, hemorrhage, adverse drug reaction and aspiration were discussed  The patient was placed in the left lateral decubitus position    Based on the pre-procedure assessment, including review of the patient's medical history, medications, allergies, and review of systems, she had been deemed to be an appropriate candidate for conscious sedation; she was therefore sedated with the medications listed below  The patient was monitored continuously with telemetry, pulse oximetry, blood pressure monitoring, and direct observations  A rectal examination was performed  The pediatric colonoscope was inserted into the rectum and advanced under direct vision to the cecum, which was identified by the ileocecal valve and appendiceal orifice  The quality of the colonic preparation was fair  A careful inspection was made as the colonoscope was withdrawn, including a retroflexed view of the rectum; findings and interventions are described below  SPECIMENS TAKEN:      Cecal polyp    ESTIMATED BLOOD LOSS:      minimal      FINDINGS:  1  Only fair preparation in the cecum and proximal right colon  Scattered areas of solid stool throughout the colon  Inadequate preparation for polyp surveillance  2  One 6mm cecal polyp, removed with hot snare  3  Sigmoid diverticulosis  4  Grade 3 internal hemorrhoids  5  Area of the transverse colon examined carefully and no masses or mucosal abnormalities found  COMPLICATIONS:   None; patient tolerated the procedure well  IMPRESSION:    White plaques in the esophagus, biopsied  Hiatal Hernia  Normal anastomosis of the T[place]-en-Y  One cecal polyp, removed  Normal hepatic flexure  Sigmoid diverticulosis  Internal hemorrhoids  Fair prep    RECOMMENDATIONS:  Repeat colonoscopy in 1 years or sooner if clinically indicated  Repeat colonoscopy is being recommended at an interval of less than 10 years, this is because of an inadequate bowel preparation  Resume home meds  Resume previous diet  We will call you with the results of the biopsy in 2 weeks    Will follow up as outpatient for repeat colonoscopy in one year   Hematology consult as outpatient for IV Iron  GI follow up in 2 months as outpatient  Please call with questions              DISPOSITION: PACU           CONDITION: Stable    SIGNATURE: Dennis Lerma MD  DATE: December 3, 2018  TIME: 2:29 PM

## 2018-12-03 NOTE — PROGRESS NOTES
Progress Note - Nupur Michael 1960, 59 y o  female MRN: [id number]    Unit/Bed#: -02 Encounter: [id number]    Primary Care Provider: Carlito Nowak MD   Date and time admitted to hospital: 11/30/2018  2:10 PM        * Acute respiratory failure with hypoxia ([facility] [address] )   Assessment & Plan    Acute hypoxic respiratory failure which was present on admission resolved with diuresis and treatment of asthma exacerbation     Atrial flutter [facility])   Assessment & Plan    Patient currently is in atrial flutter with heart rates in the 80s will continue diltiazem and metoprolol  Await MITCH guided cardioversion after EGD and colonoscopy     Acute diastolic congestive heart failure ([facility] )   Assessment & Plan    Acute diastolic CHF was present admission due to rapid atrial flutter  Patient is improving on IV Lasix  Severe persistent asthma with acute exacerbation   Assessment & Plan    Patient's asthma exacerbation is improving, she was switched to p  O  Prednisone will continue Xopenex and Breo     Iron deficiency anemia   Assessment & Plan    Hemoglobin is 10 5 today, patient is for EGD and colonoscopy before starting her on anticoagulation for CVA prophylaxis     Type 2 diabetes mellitus, without long-term current use of insulin [facility])   Assessment & Plan    Lab Results   Component Value Date    HGBA1C 6 8 (H) 09/22/2017       Recent Labs      12/02/18   1125  12/02/18   1639  12/02/18   2134  12/03/18   0553   POCGLU  284*  301*  223*  173*       Blood Sugar Average: Last 72 hrs:  (P) 275 6     Blood sugars were uncontrolled on IV Solu-Medrol and now improving of p o   Prednisone, will continue sliding scale Humalog before EGD and colonoscopy         VTE Prophylaxis: in place    Patient Centered Rounds: I rounded with patient's nurse    Current Length of Stay: 3 day(s)    Current Patient Status: Inpatient    Certification Statement: Pt requires additional inpatient hospital stay due to: see assessment and plan        Subjective:   Patient reports improved shortness of breath, cough, wheezing and denies any chest pain, palpitations, abdominal pain, signs of bleeding, dysuria    All other ROS are negative    Objective:     Vitals:   Temp (24hrs), Av 8 °F (36 6 °C), Min:97 4 °F (36 3 °C), Max:98 5 °F (36 9 °C)    Temp:  [97 4 °F (36 3 °C)-98 5 °F (36 9 °C)] 97 5 °F (36 4 °C)  HR:  [] 99  Resp:  [16-20] 20  BP: (114-153)/(72-84) 153/84  SpO2:  [94 %-98 %] 95 %  Body mass index is 46 9 kg/m²  Input and Output Summary (last 24 hours): Intake/Output Summary (Last 24 hours) at 18 1034  Last data filed at 18 0947   Gross per 24 hour   Intake             6685 ml   Output             3950 ml   Net             2735 ml       Physical Exam:     Physical Exam   Constitutional: She is oriented to person, place, and time  She appears well-developed  No distress  HENT:   Head: Normocephalic  Mouth/Throat: Oropharynx is clear and moist    Eyes: Conjunctivae are normal    Neck: Neck supple  Cardiovascular: Normal rate  Irregular irregular with trace pedal edema which is decreasing as per patient's report   Pulmonary/Chest: Effort normal  No respiratory distress  She has wheezes (Scant wheezing was heard anteriorly)  She has no rales  Abdominal: Soft  Bowel sounds are normal  She exhibits no distension  There is no tenderness  Musculoskeletal: She exhibits no tenderness  Lymphadenopathy:     She has no cervical adenopathy  Neurological: She is alert and oriented to person, place, and time  No cranial nerve deficit  Skin: Skin is warm and dry  No rash noted  Psychiatric: She has a normal mood and affect  Vitals reviewed  I personally reviewed labs and imaging reports for today        Last 24 Hours Medication List:     Current Facility-Administered Medications:  acetaminophen 650 mg Oral Q4H PRN VICKY Catsro    albuterol 2 puff Inhalation Q4H PRN Florentino Joyce, VICKY    ascorbic acid 1,000 mg Oral Daily VICKY Anton    cholecalciferol 1,000 Units Oral Daily VICKY Cohen    diltiazem 1-15 mg/hr Intravenous Titrated VICKY Anton Last Rate: Stopped (12/02/18 1045)   diltiazem 60 mg Oral Q8H [facility] Rosa Stern MD    enoxaparin 1 mg/kg Subcutaneous Q12H [facility] Dawood Paz MD    fluticasone-vilanterol 1 puff Inhalation Daily Radha Garzon DO    furosemide 40 mg Intravenous BID Damaris Pea Macritchie, CRNP    guaiFENesin 200 mg Oral Q4H PRN Damaris Pea Macritchmarquis, CRMACHO    influenza vaccine 0 5 mL Intramuscular Prior to discharge Fei Johansen MD    insulin lispro 1-5 Units Subcutaneous HS VICKY Cohen    insulin lispro 2-12 Units Subcutaneous TID AC Arpan Busch MD    levalbuterol 0 63 mg Nebulization Q6H PRN Damaris Pea Macritchmarquis, CRNP    LORazepam 1 mg Oral HS Gayeverena Peace CRNP    metoprolol 5 mg Intravenous Q6H PRN Damaris Pea Macritchie, CRNP    metoprolol succinate 50 mg Oral Daily Gaye Mcgeeririchard CRMACHO    ondansetron 4 mg Intravenous Q6H PRN Gaye Macririchard, CRMACHO    pantoprazole 40 mg Oral Early Morning Gaye VICKY Peace    pneumococcal 13-valent conjugate vaccine 0 5 mL Intramuscular Prior to discharge Fei Johansen MD    potassium chloride 10 mEq Oral BID Damaris Cris McgeeriVICKY ramos    predniSONE 40 mg Oral Daily Sydni Quispe MD    rOPINIRole 2 mg Oral HS VICKY Cohen    traMADol 100 mg Oral Q12H Arpan Busch MD    traZODone 150 mg Oral HS VICKY Anton           Today, Patient Was Seen By: Ivonne Carranza MD    ** Please Note: Dictation voice to text software may have been used in the creation of this document   **

## 2018-12-03 NOTE — PROGRESS NOTES
EGD and Colonoscopy performed  Pt tolerated the procedures well  See full op note for details  GI will sign off  Please call with questions  IMPRESSION:    White plaques in the esophagus, biopsied  Hiatal Hernia  Normal anastomosis of the Tima-en-Y  One cecal polyp, removed  Normal hepatic flexure  Sigmoid diverticulosis  Internal hemorrhoids  Fair prep     RECOMMENDATIONS:  Repeat colonoscopy in 1 years or sooner if clinically indicated  Repeat colonoscopy is being recommended at an interval of less than 10 years, this is because of an inadequate bowel preparation  Resume home meds  Resume previous diet  We will call you with the results of the biopsy in 2 weeks  Will follow up as outpatient for repeat colonoscopy in one year  Hematology consult as outpatient for IV Iron  GI follow up in 2 months as outpatient  Please call with questions

## 2018-12-03 NOTE — ASSESSMENT & PLAN NOTE
Acute hypoxic respiratory failure which was present on admission resolved with diuresis and treatment of asthma exacerbation

## 2018-12-03 NOTE — ASSESSMENT & PLAN NOTE
Patient's asthma exacerbation is improving, she was switched to p   O  Prednisone will continue Xopenex and Breo

## 2018-12-03 NOTE — ASSESSMENT & PLAN NOTE
Acute diastolic CHF was present admission due to rapid atrial flutter  Patient is improving on IV Lasix

## 2018-12-03 NOTE — SOCIAL WORK
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

## 2018-12-03 NOTE — PROGRESS NOTES
Progress Note - Pulmonary   Mattie Joy 59 y o  female MRN: [id number]  Unit/Bed#: -02 Encounter: [id number]      Assessment/Plan:    · Asthma with acute exacerbation - improving  Less wheezing on exam today  Taper prednisone by 10 mg every 3 days  Start Berrien Lambing  Continue nebulizer regimen  Patient does not have medication coverage  I will trying get her some samples of Breo Ellipta and discusse with case management regarding patient assistance programs    · Acute hypoxemic respiratory failure - resolved    · Obstructive sleep apnea, compliant with CPAP    · Tobacco abuse - we discussed the importance of complete smoking cessation  She verbalizes understanding and is committed to quitting    · Abnormal chest CT with chronic mosaic hypoattenuation and air trapping related to asthma     · Acute on chronic diastolic congestive heart failure - improving with diuresis    Subjective:   Patient feels better today  Less cough, less shortness of breath  Objective:     Vitals: Blood pressure 153/84, pulse 99, temperature 97 5 °F (36 4 °C), temperature source Oral, resp  rate 20, height 5' 3" (1 6 m), weight 120 kg (264 lb 12 4 oz), SpO2 95 % , room air, Body mass index is 46 9 kg/m²  Intake/Output Summary (Last 24 hours) at 12/03/18 0930  Last data filed at 12/03/18 6554   Gross per 24 hour   Intake             6685 ml   Output             2300 ml   Net             4385 ml       Physical Exam:      General:  Awake, alert, no distress   HEENT: PERRL, EOMI, moist mucosa    Heart:  Regular rate and rhythm, no murmur   Lungs: Scant wheeze, right greater than left   Abdomen: Soft, nontender, normal bowel sounds   Extremities: No clubbing, cyanosis or edema    Labs: I have personally reviewed pertinent lab results        Results from last 7 days  Lab Units 12/03/18  0450 12/02/18  0508 12/01/18  0505   WBC Thousand/uL 11 35* 12 20* 7 56   HEMOGLOBIN g/dL 10 5* 10 3* 10 3*   HEMATOCRIT % 34 7* 34 8 34 9 PLATELETS Thousands/uL 352 401* 331       Results from last 7 days  Lab Units 12/01/18  0505   TRIGLYCERIDES mg/dL 54   HDL mg/dL 65*       Results from last 7 days  Lab Units 12/03/18  0450 12/02/18  0508 12/01/18  0505 11/30/18  1433   POTASSIUM mmol/L 3 2* 4 0 4 4 3 7   CHLORIDE mmol/L 90* 90* 93* 94*   CO2 mmol/L 37* 32 34* 36*   BUN mg/dL 8 12 8 7   CREATININE mg/dL 0 64 0 80 0 74 0 73   CALCIUM mg/dL 8 3 8 4 8 1* 8 5   ALK PHOS U/L  --  128* 130* 138*   ALT U/L  --  46 51 59   AST U/L  --  18 23 32       Results from last 7 days  Lab Units 12/03/18  0450 12/02/18  0508 12/01/18  0505 11/30/18  1433   INR  1 48* 1 61* 1 27* 1 22*   PTT seconds  --   --   --  29       Results from last 7 days  Lab Units 12/02/18  0508 11/30/18  2303 11/30/18  1433   MAGNESIUM mg/dL 2 0 1 9 1 8

## 2018-12-03 NOTE — OP NOTE
ESOPHAGOGASTRODUODENOSCOPY(EGD) and COLONOSCOPY    SEDATION: Monitored anesthesia care, check anesthesia records    ASA Class: 3    INDICATIONS: Iron Deficiency Anemia, abnormal abdominal CT @ hepatic flexure    CONSENT:  Informed consent was obtained for the procedure, including sedation after explaining the risks and benefits of the procedure  Risks including but not limited to bleeding, perforation, infection, and missed lesion  PREPARATION:   Telemetry, pulse oximetry, blood pressure were monitored throughout the procedure  Patient was identified by myself both verbally and by visual inspection of ID band  PROCEDURE: EGD    DESCRIPTION:   Patient was placed in the left lateral decubitus position and was sedated with the above medication  The gastroscope was introduced in to the oropharynx and the esophagus was intubated under direct visualization  Scope was passed down the esophagus up to efferent and afferent limbs of the small bowel bypassed regions  A careful inspection was made as the gastroscope was withdrawn, including a retroflexed view of the stomach; findings and interventions are described below  SPECIMENS TAKEN:      Esophageal biopsies rule out EOE and Candida     ESTIMATED BLOOD LOSS:      Minimal      FINDINGS:    #1  Esophagus- White plaques in the esophagus diffusely, unclear if this is dry mucus or candida  Biopsies taken  Small hiatal hernia  #2  Stomach- remnant pouch to the anastomosis without any abnormalities  Normal mucosa @ the anastomosis  #3  Jejunum- normal mucosa noted on the efferent and afferent limbs of the small bowel distal to the anastomosis  PROCEDURE: COLONOSCOPY    DESCRIPTION:     Prior colonoscopy: 9 years ago  Informed consent was obtained for the procedure, including sedation  Risks of perforation, hemorrhage, adverse drug reaction and aspiration were discussed  The patient was placed in the left lateral decubitus position    Based on the pre-procedure assessment, including review of the patient's medical history, medications, allergies, and review of systems, she had been deemed to be an appropriate candidate for conscious sedation; she was therefore sedated with the medications listed below  The patient was monitored continuously with telemetry, pulse oximetry, blood pressure monitoring, and direct observations  A rectal examination was performed  The pediatric colonoscope was inserted into the rectum and advanced under direct vision to the cecum, which was identified by the ileocecal valve and appendiceal orifice  The quality of the colonic preparation was fair  A careful inspection was made as the colonoscope was withdrawn, including a retroflexed view of the rectum; findings and interventions are described below  SPECIMENS TAKEN:      Cecal polyp    ESTIMATED BLOOD LOSS:      minimal      FINDINGS:  1  Only fair preparation in the cecum and proximal right colon  Scattered areas of solid stool throughout the colon  Inadequate preparation for polyp surveillance  2  One 6mm cecal polyp, removed with hot snare  3  Sigmoid diverticulosis  4  Grade 3 internal hemorrhoids  5  Area of the transverse colon examined carefully and no masses or mucosal abnormalities found  COMPLICATIONS:   None; patient tolerated the procedure well  IMPRESSION:    White plaques in the esophagus, biopsied  Hiatal Hernia  Normal anastomosis of the Itma-en-Y  One cecal polyp, removed  Normal hepatic flexure  Sigmoid diverticulosis  Internal hemorrhoids  Fair prep    RECOMMENDATIONS:  Repeat colonoscopy in 1 years or sooner if clinically indicated  Repeat colonoscopy is being recommended at an interval of less than 10 years, this is because of an inadequate bowel preparation  Resume home meds  Resume previous diet  We will call you with the results of the biopsy in 2 weeks    Will follow up as outpatient for repeat colonoscopy in one year   Hematology consult as outpatient for IV Iron  GI follow up in 2 months as outpatient  Please call with questions              DISPOSITION: PACU           CONDITION: Stable    SIGNATURE: Gemini Garcia MD  DATE: December 3, 2018  TIME: 2:29 PM

## 2018-12-03 NOTE — ASSESSMENT & PLAN NOTE
Patient currently is in atrial flutter with heart rates in the 80s will continue diltiazem and metoprolol    Await MITCH guided cardioversion after EGD and colonoscopy

## 2018-12-03 NOTE — ASSESSMENT & PLAN NOTE
Hemoglobin is 10 5 today, patient is for EGD and colonoscopy before starting her on anticoagulation for CVA prophylaxis

## 2018-12-04 PROBLEM — E87.6 HYPOKALEMIA: Status: ACTIVE | Noted: 2018-12-04

## 2018-12-04 LAB
ANION GAP SERPL CALCULATED.3IONS-SCNC: 5 MMOL/L (ref 4–13)
ATRIAL RATE: 137 BPM
ATRIAL RATE: 264 BPM
ATRIAL RATE: 268 BPM
ATRIAL RATE: 271 BPM
ATRIAL RATE: 278 BPM
BASOPHILS # BLD AUTO: 0.02 THOUSANDS/ΜL (ref 0–0.1)
BASOPHILS NFR BLD AUTO: 0 % (ref 0–1)
BUN SERPL-MCNC: 9 MG/DL (ref 5–25)
CALCIUM SERPL-MCNC: 8.1 MG/DL (ref 8.3–10.1)
CHLORIDE SERPL-SCNC: 94 MMOL/L (ref 100–108)
CO2 SERPL-SCNC: 36 MMOL/L (ref 21–32)
CREAT SERPL-MCNC: 0.73 MG/DL (ref 0.6–1.3)
EOSINOPHIL # BLD AUTO: 0.13 THOUSAND/ΜL (ref 0–0.61)
EOSINOPHIL NFR BLD AUTO: 1 % (ref 0–6)
ERYTHROCYTE [DISTWIDTH] IN BLOOD BY AUTOMATED COUNT: 20 % (ref 11.6–15.1)
GFR SERPL CREATININE-BSD FRML MDRD: 90 ML/MIN/1.73SQ M
GLUCOSE SERPL-MCNC: 187 MG/DL (ref 65–140)
GLUCOSE SERPL-MCNC: 196 MG/DL (ref 65–140)
GLUCOSE SERPL-MCNC: 198 MG/DL (ref 65–140)
GLUCOSE SERPL-MCNC: 221 MG/DL (ref 65–140)
GLUCOSE SERPL-MCNC: 270 MG/DL (ref 65–140)
GLUCOSE SERPL-MCNC: 302 MG/DL (ref 65–140)
HCT VFR BLD AUTO: 35.3 % (ref 34.8–46.1)
HGB BLD-MCNC: 10.6 G/DL (ref 11.5–15.4)
IMM GRANULOCYTES # BLD AUTO: 0.06 THOUSAND/UL (ref 0–0.2)
IMM GRANULOCYTES NFR BLD AUTO: 1 % (ref 0–2)
LYMPHOCYTES # BLD AUTO: 2.31 THOUSANDS/ΜL (ref 0.6–4.47)
LYMPHOCYTES NFR BLD AUTO: 25 % (ref 14–44)
MCH RBC QN AUTO: 20.8 PG (ref 26.8–34.3)
MCHC RBC AUTO-ENTMCNC: 30 G/DL (ref 31.4–37.4)
MCV RBC AUTO: 69 FL (ref 82–98)
MONOCYTES # BLD AUTO: 0.77 THOUSAND/ΜL (ref 0.17–1.22)
MONOCYTES NFR BLD AUTO: 8 % (ref 4–12)
NEUTROPHILS # BLD AUTO: 6.15 THOUSANDS/ΜL (ref 1.85–7.62)
NEUTS SEG NFR BLD AUTO: 65 % (ref 43–75)
NRBC BLD AUTO-RTO: 0 /100 WBCS
P AXIS: 269 DEGREES
P AXIS: 94 DEGREES
PLATELET # BLD AUTO: 338 THOUSANDS/UL (ref 149–390)
PMV BLD AUTO: 10.3 FL (ref 8.9–12.7)
POTASSIUM SERPL-SCNC: 3.4 MMOL/L (ref 3.5–5.3)
QRS AXIS: 61 DEGREES
QRS AXIS: 69 DEGREES
QRS AXIS: 74 DEGREES
QRS AXIS: 82 DEGREES
QRS AXIS: 85 DEGREES
QRSD INTERVAL: 68 MS
QRSD INTERVAL: 76 MS
QRSD INTERVAL: 78 MS
QT INTERVAL: 244 MS
QT INTERVAL: 294 MS
QT INTERVAL: 316 MS
QT INTERVAL: 338 MS
QT INTERVAL: 356 MS
QTC INTERVAL: 368 MS
QTC INTERVAL: 424 MS
QTC INTERVAL: 440 MS
QTC INTERVAL: 461 MS
QTC INTERVAL: 471 MS
RBC # BLD AUTO: 5.09 MILLION/UL (ref 3.81–5.12)
SODIUM SERPL-SCNC: 135 MMOL/L (ref 136–145)
T WAVE AXIS: -71 DEGREES
T WAVE AXIS: 62 DEGREES
T WAVE AXIS: 75 DEGREES
T WAVE AXIS: 80 DEGREES
T WAVE AXIS: 82 DEGREES
VENTRICULAR RATE: 101 BPM
VENTRICULAR RATE: 102 BPM
VENTRICULAR RATE: 125 BPM
VENTRICULAR RATE: 134 BPM
VENTRICULAR RATE: 137 BPM
WBC # BLD AUTO: 9.44 THOUSAND/UL (ref 4.31–10.16)

## 2018-12-04 PROCEDURE — 93010 ELECTROCARDIOGRAM REPORT: CPT | Performed by: INTERNAL MEDICINE

## 2018-12-04 PROCEDURE — 80048 BASIC METABOLIC PNL TOTAL CA: CPT | Performed by: PHYSICIAN ASSISTANT

## 2018-12-04 PROCEDURE — 85025 COMPLETE CBC W/AUTO DIFF WBC: CPT | Performed by: PHYSICIAN ASSISTANT

## 2018-12-04 PROCEDURE — 99232 SBSQ HOSP IP/OBS MODERATE 35: CPT | Performed by: INTERNAL MEDICINE

## 2018-12-04 PROCEDURE — 82948 REAGENT STRIP/BLOOD GLUCOSE: CPT

## 2018-12-04 PROCEDURE — 94150 VITAL CAPACITY TEST: CPT

## 2018-12-04 PROCEDURE — 99233 SBSQ HOSP IP/OBS HIGH 50: CPT | Performed by: INTERNAL MEDICINE

## 2018-12-04 PROCEDURE — 94660 CPAP INITIATION&MGMT: CPT

## 2018-12-04 PROCEDURE — 94760 N-INVAS EAR/PLS OXIMETRY 1: CPT

## 2018-12-04 RX ORDER — PREDNISONE 20 MG/1
20 TABLET ORAL DAILY
Status: DISCONTINUED | OUTPATIENT
Start: 2018-12-04 | End: 2018-12-07 | Stop reason: HOSPADM

## 2018-12-04 RX ORDER — FUROSEMIDE 40 MG/1
40 TABLET ORAL
Status: DISCONTINUED | OUTPATIENT
Start: 2018-12-04 | End: 2018-12-07 | Stop reason: HOSPADM

## 2018-12-04 RX ORDER — DILTIAZEM HYDROCHLORIDE 240 MG/1
240 CAPSULE, COATED, EXTENDED RELEASE ORAL DAILY
Status: DISCONTINUED | OUTPATIENT
Start: 2018-12-04 | End: 2018-12-05

## 2018-12-04 RX ORDER — POTASSIUM CHLORIDE 20 MEQ/1
20 TABLET, EXTENDED RELEASE ORAL 2 TIMES DAILY
Status: DISCONTINUED | OUTPATIENT
Start: 2018-12-04 | End: 2018-12-07 | Stop reason: HOSPADM

## 2018-12-04 RX ADMIN — TRAMADOL HYDROCHLORIDE 100 MG: 50 TABLET, COATED ORAL at 21:12

## 2018-12-04 RX ADMIN — LORAZEPAM 1 MG: 1 TABLET ORAL at 21:12

## 2018-12-04 RX ADMIN — INSULIN LISPRO 2 UNITS: 100 INJECTION, SOLUTION INTRAVENOUS; SUBCUTANEOUS at 21:17

## 2018-12-04 RX ADMIN — VITAMIN D, TAB 1000IU (100/BT) 1000 UNITS: 25 TAB at 09:48

## 2018-12-04 RX ADMIN — FUROSEMIDE 40 MG: 40 TABLET ORAL at 09:52

## 2018-12-04 RX ADMIN — DILTIAZEM HYDROCHLORIDE 240 MG: 240 CAPSULE, COATED, EXTENDED RELEASE ORAL at 09:51

## 2018-12-04 RX ADMIN — TRAMADOL HYDROCHLORIDE 100 MG: 50 TABLET, COATED ORAL at 09:52

## 2018-12-04 RX ADMIN — PANTOPRAZOLE SODIUM 40 MG: 40 TABLET, DELAYED RELEASE ORAL at 05:05

## 2018-12-04 RX ADMIN — INSULIN LISPRO 4 UNITS: 100 INJECTION, SOLUTION INTRAVENOUS; SUBCUTANEOUS at 13:26

## 2018-12-04 RX ADMIN — METOPROLOL SUCCINATE 50 MG: 50 TABLET, EXTENDED RELEASE ORAL at 09:52

## 2018-12-04 RX ADMIN — APIXABAN 5 MG: 5 TABLET, FILM COATED ORAL at 21:00

## 2018-12-04 RX ADMIN — ROPINIROLE 2 MG: 2 TABLET, FILM COATED ORAL at 21:13

## 2018-12-04 RX ADMIN — IRON SUCROSE 200 MG: 20 INJECTION, SOLUTION INTRAVENOUS at 09:46

## 2018-12-04 RX ADMIN — DILTIAZEM HYDROCHLORIDE 60 MG: 60 TABLET, FILM COATED ORAL at 05:05

## 2018-12-04 RX ADMIN — FUROSEMIDE 40 MG: 40 TABLET ORAL at 15:44

## 2018-12-04 RX ADMIN — PREDNISONE 20 MG: 20 TABLET ORAL at 09:49

## 2018-12-04 RX ADMIN — TRAZODONE HYDROCHLORIDE 150 MG: 150 TABLET ORAL at 21:12

## 2018-12-04 RX ADMIN — OXYCODONE HYDROCHLORIDE AND ACETAMINOPHEN 1000 MG: 500 TABLET ORAL at 09:48

## 2018-12-04 RX ADMIN — POTASSIUM CHLORIDE 20 MEQ: 1500 TABLET, EXTENDED RELEASE ORAL at 17:00

## 2018-12-04 RX ADMIN — FLUTICASONE FUROATE AND VILANTEROL TRIFENATATE 1 PUFF: 100; 25 POWDER RESPIRATORY (INHALATION) at 10:08

## 2018-12-04 RX ADMIN — INSULIN LISPRO 8 UNITS: 100 INJECTION, SOLUTION INTRAVENOUS; SUBCUTANEOUS at 17:00

## 2018-12-04 RX ADMIN — INSULIN LISPRO 2 UNITS: 100 INJECTION, SOLUTION INTRAVENOUS; SUBCUTANEOUS at 06:21

## 2018-12-04 RX ADMIN — POTASSIUM CHLORIDE 20 MEQ: 1500 TABLET, EXTENDED RELEASE ORAL at 09:48

## 2018-12-04 NOTE — PROGRESS NOTES
Cardiology Follow Up    Myla Scott  1960  [id number]  [unfilled]    [unfilled]    Interval History: No chest pain or shortness of breath at rest      Problem List     * (Principal)Acute respiratory failure with hypoxia (HCC)    Benign essential hypertension    Obstructive sleep apnea    Overview Addendum 2/20/2018 10:37 AM by Gaudencio Bates DO     AutoPap 12-20 cmH20 with Cflex 3  Resmed Airfit N-20 interface with heated humidification         Severe persistent asthma with acute exacerbation    Overview Signed 2/20/2018 10:47 AM by Gaudencio Bates DO     PFTs February 8, 2018:  FEV1 0 87 L-35% predicted, 27% improvement post-bronchodilator  DLCO-82% predicted,         Tobacco use    Overview Signed 2/20/2018 10:48 AM by Gaudencio Bates DO     Currently smoking 3-4 cigarettes daily         Type 2 diabetes mellitus, without long-term current use of insulin ([address] )    Lab Results   Component Value Date    HGBA1C 6 8 (H) 09/22/2017       Recent Labs      12/03/18   2106  12/04/18   0512  12/04/18   1144  12/04/18   1326   POCGLU  318*  196*  187*  221*       Blood Sugar Average: Last 72 hrs:  (P) 816 [id number]        Acute diastolic congestive heart failure (HCC)    Atrial flutter (HCC)    Abnormal computed tomography angiography (CTA) of abdomen    Overview Signed 11/30/2018 11:12 PM by VICKY Rosenberg     CT of chest abdomen and pelvis shows soft tissue thickening at hepatic flexure which may be due to peristalsis  Possibility of underlying colonic neoplasm cannot be ruled out  Recommend colonoscopy  Enlarged heterogeneously enhancing liver  Correlation with liver function testing recommended  If liver function abnormal consider nonemergent ultrasound  Will have hepatic function panel drawn in a m            Abnormal CT of the abdomen    Overview Signed 12/1/2018  4:23 PM by Siddhartha Mahajan MD     Added automatically from request for surgery [id number]         Iron deficiency anemia due to chronic blood loss    Overview Signed 12/1/2018  4:23 PM by Jayla Albert MD     Added automatically from request for surgery [id number]         Trigger thumb, left thumb    Trigger middle finger of left hand    Trigger ring finger of left hand    Ganglion cyst of flexor tendon sheath    Diabetes mellitus with hyperglycemia ([facility] )    Lab Results   Component Value Date    HGBA1C 6 8 (H) 09/22/2017       Recent Labs      12/03/18   2106  12/04/18   0512  12/04/18   1144  12/04/18   1326   POCGLU  318*  196*  187*  221*       Blood Sugar Average: Last 72 hrs:  (P) 286 [id number]        Esophageal reflux    Depression with anxiety    Cervical radiculopathy    Chronic low back pain    Hypercholesterolemia    Hypokalemia        Past Medical History:   Diagnosis Date    Anemia     Cervical radiculopathy     CHF (congestive heart failure) (HCC)     Chronic low back pain     Chronic obstructive asthma ([facility] ) 2/20/2018    Community acquired pneumonia     Diabetes mellitus ([facility] )     Diabetes mellitus with hyperglycemia (HCC)     Elevated liver enzymes     GERD (gastroesophageal reflux disease)     Paresthesia of upper extremity     Plantar fasciitis     Restless leg     Sexual dysfunction     Sleep apnea, obstructive     Tenosynovitis of finger     Tinea corporis     Weakness of upper extremity      Social History     Social History    Marital status: Significant Other     Spouse name: N/A    Number of children: N/A    Years of education: N/A     Occupational History    Not on file       Social History Main Topics    Smoking status: Current Every Day Smoker     Packs/day: 0 25     Years: 9 00     Types: Cigarettes    Smokeless tobacco: Never Used      Comment: hasn't smoked for a few days d/t being sick    Alcohol use Yes      Comment: about 6 [employer] light every night    Drug use: No    Sexual activity: Not on file     Other Topics Concern    Not on file     Social History Narrative    No narrative on file      Family History   Problem Relation Age of Onset    Alzheimer's disease Mother     Atrial fibrillation Mother     Dementia Mother     Heart disease Mother         heart problem    Seizures Mother     Parkinsonism Father     Arthritis Father     Atrial fibrillation Father     Substance Abuse Neg Hx         mother,father    Mental illness Neg Hx         mother,father     Past Surgical History:   Procedure Laterality Date    ABDOMINAL SURGERY      CARPAL TUNNEL RELEASE Bilateral     CHOLECYSTECTOMY      laparoscopic    ESOPHAGOGASTRODUODENOSCOPY N/A 12/3/2018    Procedure: ESOPHAGOGASTRODUODENOSCOPY (EGD) AND COLONOSCOPY;  Surgeon: Dawood Paz MD;  Location: [facility];  Service: Gastroenterology    GASTRIC BYPASS      for morbid obesity with gilda en y    HERNIA REPAIR      HYSTERECTOMY      PA EXCIS PRIMARY GANGLION WRIST Left 12/14/2017    Procedure: LONG FINGER GANGLION CYST EXCISION;  Surgeon: Shane Hansen MD;  [facility];  Service: Orthopedics    PA INCISE FINGER TENDON SHEATH Left 12/14/2017    Procedure: THUMB, LONG, RING, TRIGGER FINGER RELEASE;  Surgeon: Shane Hansen MD;  [facility];  Service: Orthopedics    SHOULDER SURGERY         Current Facility-Administered Medications:     acetaminophen (TYLENOL) tablet 650 mg, 650 mg, Oral, Q4H PRN, VICKY Anton, 650 mg at 12/02/18 1221    albuterol (PROVENTIL HFA,VENTOLIN HFA) inhaler 2 puff, 2 puff, Inhalation, Q4H PRN, VICKY Anton    apixaban (ELIQUIS) tablet 5 mg, 5 mg, Oral, BID, Irma Fly, DO    ascorbic acid (VITAMIN C) tablet 1,000 mg, 1,000 mg, Oral, Daily, VICKY Cohen, 1,000 mg at 12/04/18 9647    cholecalciferol (VITAMIN D3) tablet 1,000 Units, 1,000 Units, Oral, Daily, VICKY Cohen, 1,000 Units at 12/04/18 0948    diltiazem (CARDIZEM CD) 24 hr capsule 240 mg, 240 mg, Oral, Daily, Irma Fly, DO, 240 mg at 12/04/18 0951   fentaNYL (SUBLIMAZE) injection 25 mcg, 25 mcg, Intravenous, Q5 Min PRN, Ashok Stephen, DO    fluticasone-vilanterol (BREO ELLIPTA) 100-25 mcg/inh inhaler 1 puff, 1 puff, Inhalation, Daily, Radha Garzon, DO, 1 puff at 12/04/18 1008    furosemide (LASIX) tablet 40 mg, 40 mg, Oral, BID (diuretic), Irma Fly, DO, 40 mg at 12/04/18 2096    guaiFENesin (ROBITUSSIN) oral solution 200 mg, 200 mg, Oral, Q4H PRN, VICKY Apodaca    influenza vaccine, recombinant, quadrivalent (FLUBLOK) IM injection 0 5 mL, 0 5 mL, Intramuscular, Prior to discharge, Matthieu Koehler MD    insulin lispro (HumaLOG) 100 units/mL subcutaneous injection 1-5 Units, 1-5 Units, Subcutaneous, HS, VICKY Cohen, 3 Units at 12/03/18 2216    insulin lispro (HumaLOG) 100 units/mL subcutaneous injection 2-12 Units, 2-12 Units, Subcutaneous, TID AC, 4 Units at 12/04/18 1326 **AND** Fingerstick Glucose (POCT), , , TID AC, Santana Owens MD    levalbuterol (XOPENEX) inhalation solution 0 63 mg, 0 63 mg, Nebulization, Q6H PRN, VICKY Apodaca, 0 63 mg at 12/03/18 1918    LORazepam (ATIVAN) tablet 1 mg, 1 mg, Oral, HS, VICKY Cohen, 1 mg at 12/03/18 2208    metoprolol (LOPRESSOR) injection 5 mg, 5 mg, Intravenous, Q6H PRN, VICKY Apodaca, 5 mg at 12/01/18 1157    metoprolol succinate (TOPROL-XL) 24 hr tablet 50 mg, 50 mg, Oral, Daily, VICKY Cohen, 50 mg at 12/04/18 0952    ondansetron (ZOFRAN) injection 4 mg, 4 mg, Intravenous, Q6H PRN, VICKY Apodaca    ondansetron [facility]) injection 4 mg, 4 mg, Intravenous, Once PRN, Ashok Stephen DO    pantoprazole (PROTONIX) EC tablet 40 mg, 40 mg, Oral, Early Morning, VICKY Cohen, 40 mg at 12/04/18 0505    pneumococcal 13-valent conjugate vaccine (PREVNAR-13) IM injection 0 5 mL, 0 5 mL, Intramuscular, Prior to discharge, Matthieu Koehler MD    potassium chloride (K-DUR,KLOR-CON) CR tablet 20 mEq, 20 mEq, Oral, BID, Irma Friedman DO, 20 mEq at 12/04/18 0948    predniSONE tablet 20 mg, 20 mg, Oral, Daily, Irma Fly, DO, 20 mg at 12/04/18 5470    rOPINIRole (REQUIP) tablet 2 mg, 2 mg, Oral, HS, Gaye Macritchie, CRNP, 2 mg at 12/03/18 2208    traMADol (ULTRAM) tablet 100 mg, 100 mg, Oral, Q12H, Davidson Argueta MD, 100 mg at 12/04/18 5729    traZODone (DESYREL) tablet 150 mg, 150 mg, Oral, HS, Gaye Macritchie, CRNP, 150 mg at 12/03/18 2208  Allergies   Allergen Reactions    Iron Dextran Swelling    Januvia [Sitagliptin] Shortness Of Breath    Jardiance [Empagliflozin] Shortness Of Breath    Clonazepam Other (See Comments)     Unknown reaction    Codeine Itching and Other (See Comments)     itch;mary kay morphine,takes ultram @home    Adhesive [Medical Tape]      itching    Lactose     Oxycodone-Acetaminophen      Other reaction(s): Other (See Comments)  (PERCOCET) MILD RASH    Oxycodone-Acetaminophen Anxiety       Labs: [unfilled]    Lab Results   Component Value Date    CHOL 150 09/22/2017    CHOL 134 03/23/2017    CHOL 168 07/11/2016     Lab Results   Component Value Date    HDL 65 (H) 12/01/2018    HDL 73 09/22/2017    HDL 50 03/23/2017     Lab Results   Component Value Date    LDLCALC 61 12/01/2018    LDLCALC 61 09/22/2017    LDLCALC 58 03/23/2017     Lab Results   Component Value Date    TRIG 54 12/01/2018    TRIG 81 09/22/2017    TRIG 132 03/23/2017     No results found for: CHOLHDL    Imaging: Pe Study With Ct Abdomen And Pelvis With Contrast    Result Date: 11/30/2018  Narrative: CT PULMONARY ANGIOGRAM OF THE CHEST AND CT ABDOMEN AND PELVIS WITH INTRAVENOUS CONTRAST INDICATION:   tachycardia, SOB  R/O PE  Abdominal discomfort  COMPARISON:  2/14/2017 and 11/18/2016 TECHNIQUE:  CT examination of the chest, abdomen and pelvis was performed  Thin section CT angiographic technique was used in the chest in order to evaluate for pulmonary embolus and coronal 3D MIP postprocessing was performed on the acquisition scanner   Axial, sagittal, and coronal 2D reformatted images were created from the source data and submitted for interpretation  13 Radiation dose length product (DLP) for this visit:  1953 07 mGy-cm   This examination, like all CT scans performed in the [facility], was performed utilizing techniques to minimize radiation dose exposure, including the use of iterative reconstruction and automated exposure control  IV Contrast:  100 mL of iohexol (OMNIPAQUE) Enteric Contrast:  Enteric contrast was not administered  FINDINGS: CHEST PULMONARY ARTERIAL TREE:  No pulmonary embolus is seen  There is severe pulmonary artery enlargement, present previously and consistent with pulmonary hypertension  LUNGS:  There is a prominent mosaic attenuation of the lung fields which was present previously though appears much greater on the current study  Differential considerations include small airways disease such as respiratory bronchiolitis, hypersensitivity pneumonitis, and chronic thromboembolic disease  There is no tracheal or endobronchial lesion  PLEURA:  Unremarkable  HEART/AORTA:  Unremarkable for patient's age  MEDIASTINUM AND SHANTA:  Unremarkable  CHEST WALL AND LOWER NECK:   Unremarkable  ABDOMEN LIVER/BILIARY TREE:  The liver is heterogeneous on portal venous phase images and appears mildly enlarged  GALLBLADDER:  Gallbladder is surgically absent  SPLEEN:  Unremarkable  PANCREAS:  Unremarkable  ADRENAL GLANDS:  Unremarkable  KIDNEYS/URETERS:  Unremarkable  No hydronephrosis  STOMACH AND BOWEL:  The patient is status post gastric bypass  This appears unremarkable  Small bowel is unremarkable  At the hepatic flexure of the colon there is soft tissue density which may represent collapsed colon related to peristalsis  Underlying soft tissue lesion is not excluded  This is best seen on series 2, image 272  APPENDIX:  A normal appendix was visualized  ABDOMINOPELVIC CAVITY:  No ascites or free intraperitoneal air   No lymphadenopathy  VESSELS:  Unremarkable for patient's age  PELVIS REPRODUCTIVE ORGANS:  Patient is status post hysterectomy  URINARY BLADDER:  Unremarkable  ABDOMINAL WALL/INGUINAL REGIONS:  Unremarkable  OSSEOUS STRUCTURES:  No acute fracture or destructive osseous lesion  Impression: 1  No acute pulmonary embolism  Severe pulmonary artery enlargement consistent with pulmonary hypertension  2   Prominent mosaic attenuation of the lung fields, increased from the prior CT  Differential includes respiratory bronchiolitis, hypersensitivity pneumonitis, and chronic thromboembolic disease, particularly given the enlarged pulmonary artery  3   No acute inflammatory process in the abdomen or pelvis  4   Soft tissue thickening at the hepatic flexure which may be due to peristalsis  This appears more prominent than expected, however, and the possibility of underlying colonic neoplasm is not excluded  Further evaluation with colonoscopy is recommended if this has not been recently performed  5   Enlarged heterogeneously enhancing liver  While this may be a normal variant, correlation with liver function testing recommended  If liver function tests are abnormal, consider follow-up nonemergent ultrasound evaluation  I personally discussed this study with Tai Koehler on 11/30/2018 at 4:27 PM  Workstation performed: [id number]           ROS    Vitals:    12/04/18 1103   BP: 149/77   Pulse: 105   Resp: 18   Temp: 97 8 °F (36 6 °C)   SpO2: 92%           Physical Exam   Constitutional: She is oriented to person, place, and time  No distress  HENT:   Mouth/Throat: No oropharyngeal exudate  Eyes: No scleral icterus  Neck: No JVD present  Cardiovascular: Normal rate  An irregularly irregular rhythm present  Pulmonary/Chest: Effort normal  She has wheezes  Abdominal: Soft  Bowel sounds are normal  She exhibits no distension  There is no tenderness  There is no rebound     Musculoskeletal: She exhibits no edema  Neurological: She is alert and oriented to person, place, and time  Skin: Skin is warm and dry  She is not diaphoretic  Discussion/Summary:    Persistent Atrial Flutter: EGD and colonoscopy completed  She is on anticoagualtion  Will plan on MITCH guided cardioversion tomorrow  The patient was counseled regarding diagnostic results, instructions for management, risk factor reductions, impressions  total time of encounter was 25 minutes and 15 minutes was spent counseling

## 2018-12-04 NOTE — UTILIZATION REVIEW
Continued Stay Review  Date: 12/4/18  Vital Signs: /77   Pulse 98   Temp 98 °F (36 7 °C)   Resp 18   Ht 5' 3" (1 6 m)   Wt 118 kg (260 lb 6 4 oz)   SpO2 95%   BMI 46 13 kg/m²     Medications:   Scheduled Meds:   Current Facility-Administered Medications:  acetaminophen 650 mg Oral Q4H PRN VICKY Gurrola   albuterol 2 puff Inhalation Q4H PRN VICKY Heller   apixaban 5 mg Oral BID Irma Fly, DO   ascorbic acid 1,000 mg Oral Daily VICKY Gurrola   cholecalciferol 1,000 Units Oral Daily VICKY Cohen   diltiazem 240 mg Oral Daily Irma Fly, DO   fentaNYL 25 mcg Intravenous Q5 Min PRN Darien Gaslin, DO   fluticasone-vilanterol 1 puff Inhalation Daily Radha Garzon, DO   furosemide 40 mg Oral BID (diuretic) Irma Fly, DO   guaiFENesin 200 mg Oral Q4H PRN VICKY Heller   influenza vaccine 0 5 mL Intramuscular Prior to discharge Baylee Kothari MD   insulin lispro 1-5 Units Subcutaneous HS VICKY Cohen   insulin lispro 2-12 Units Subcutaneous TID AC Christiano Tony MD   levalbuterol 0 63 mg Nebulization Q6H PRN VICKY Heller   LORazepam 1 mg Oral HS VICKY Cohen   metoprolol 5 mg Intravenous Q6H PRN VICKY Heller   metoprolol succinate 50 mg Oral Daily VICKY Cohen   ondansetron 4 mg Intravenous Q6H PRN VICKY Heller   ondansetron 4 mg Intravenous Once PRN Darien Ibrahim,    pantoprazole 40 mg Oral Early Morning VICKY Cohen   pneumococcal 13-valent conjugate vaccine 0 5 mL Intramuscular Prior to discharge Baylee Kothari MD   potassium chloride 20 mEq Oral BID Irma Fly, DO   predniSONE 20 mg Oral Daily Irma Fly, DO   rOPINIRole 2 mg Oral HS VICKY Cohen   traMADol 100 mg Oral Q12H Christiano Tony MD   traZODone 150 mg Oral HS VICKY Cohen     Continuous Infusions:    PRN Meds:   acetaminophen    albuterol    fentaNYL    guaiFENesin    influenza vaccine    levalbuterol   metoprolol    ondansetron    ondansetron    pneumococcal 13-valent conjugate vaccine    Abnormal Labs/Diagnostic Results:   HGB 10 6  K 3 4 CL 94 CO2 36 GLUC 198 CA 8 1     Age/Sex: 59 y o  female     Assessment/Plan:   Patient without complaints of lightheadedness or dizziness  Still having some cough but less shortness of breath  Still has some leg edema  Had EGD and colonoscopy yesterday and had a Colonic polyp-GI to follow up with those biopsy results  · Acute hypoxic respiratory failure with asthma exacerbation/acute bronchitis issues-showing some improvement in respiratory status-currently on prednisone 40 mg daily will decrease to 30 mg daily  · Atrial flutter-probable recent onset-for MITCH directed cardioversion today tentatively at 3:00 p m -rates are controlled in the  range  · Acute diastolic CHF-still has  edema in the legs -will switch to p o   Lasix  · Obstructive sleep apnea-on CPAP been utilizing this regularly  · Diabetes mellitus type 2-glucose elevated 198 likely aggravated by steroids-continue on coverage  · Mild anemia H&H 10 6/35 3 with microcytic indices-GI is following  · History of Tams-nl-V-EGD showed hiatal hernia with white plaques in the esophagus which were biopsied-patient will need IV iron as an outpatient to be continued-will given a for today  · Mild hypokalemia-will increase supplements    Discharge Plan: TBD

## 2018-12-04 NOTE — SOCIAL WORK
Spoke with patient about cost of Eliquis  She cannot afford it without insurance  Completed application for assistance through [address] [place] [address] for assistance with Eliquis and [place] [facility]  [place] for pulmicort assistance  [place] provides $100 off of $346 through patient assistance program   Patient is not interested in this program as she cannot afford the $246/mos  She states that Dr Veto Schilder provided her with a months worth of Breo  Completed [employer] financial assistance application  Will follow

## 2018-12-04 NOTE — PROGRESS NOTES
Progress Note - Pulmonary   Mattie Joy 59 y o  female MRN: [id number]  Unit/Bed#: -02 Encounter: [id number]      Assessment/Plan:    · Asthma with acute exacerbation - continues to improve  Continue prednisone taper, tapering by 10 mg every 3 days  I provided her with samples of Breo Ellipta 100 mcg once daily  Hopefully, case management can assist with a patient assistance program     · Acute hypoxemic respiratory failure - resolved    · Obstructive sleep apnea, compliant with CPAP    · Tobacco abuse - smoking cessation    · Abnormal chest CT with chronic mosaic hypoattenuation in air trapping related to asthma - treatment for asthma as above    D/W Dr Jim Penaloza  Pt OK for discharge from pulmonary perspective  Will sign off  Call if needed  She will follow up with us in the office as per discharge instructions  Subjective:   Patient feels better  Less SOB  No cough  Objective:     Vitals: Blood pressure 149/77, pulse 99, temperature 98 2 °F (36 8 °C), resp  rate 20, height 5' 3" (1 6 m), weight 118 kg (260 lb 6 4 oz), SpO2 93 % , RA, Body mass index is 46 13 kg/m²  Intake/Output Summary (Last 24 hours) at 12/04/18 1011  Last data filed at 12/04/18 0601   Gross per 24 hour   Intake             1210 ml   Output             2950 ml   Net            -1740 ml       Physical Exam:      General:  Awake, alert, no distress   HEENT: PERRL, EOMI, moist mucosa    Heart:  Regular rate and rhythm, no murmur   Lungs: Scant wheeze on the right, otherwise clear   Abdomen: Soft, nontender, normal bowel sounds   Extremities: No clubbing, cyanosis or edema    Labs: I have personally reviewed pertinent lab results        Results from last 7 days  Lab Units 12/04/18  0513 12/03/18  0450 12/02/18  0508   WBC Thousand/uL 9 44 11 35* 12 20*   HEMOGLOBIN g/dL 10 6* 10 5* 10 3*   HEMATOCRIT % 35 3 34 7* 34 8   PLATELETS Thousands/uL 338 352 401*       Results from last 7 days  Lab Units 12/01/18  0505   TRIGLYCERIDES mg/dL 54   HDL mg/dL 65*       Results from last 7 days  Lab Units 12/04/18  0513 12/03/18  0450 12/02/18  0508 12/01/18  0505 11/30/18  1433   POTASSIUM mmol/L 3 4* 3 2* 4 0 4 4 3 7   CHLORIDE mmol/L 94* 90* 90* 93* 94*   CO2 mmol/L 36* 37* 32 34* 36*   BUN mg/dL 9 8 12 8 7   CREATININE mg/dL 0 73 0 64 0 80 0 74 0 73   CALCIUM mg/dL 8 1* 8 3 8 4 8 1* 8 5   ALK PHOS U/L  --   --  128* 130* 138*   ALT U/L  --   --  46 51 59   AST U/L  --   --  18 23 32       Results from last 7 days  Lab Units 12/03/18  0450 12/02/18  0508 12/01/18  0505 11/30/18  1433   INR  1 48* 1 61* 1 27* 1 22*   PTT seconds  --   --   --  29       Results from last 7 days  Lab Units 12/02/18  0508 11/30/18  2303 11/30/18  1433   MAGNESIUM mg/dL 2 0 1 9 1 8

## 2018-12-05 ENCOUNTER — APPOINTMENT (INPATIENT)
Dept: NON INVASIVE DIAGNOSTICS | Facility: HOSPITAL | Age: 58
DRG: 194 | End: 2018-12-05
Attending: INTERNAL MEDICINE
Payer: COMMERCIAL

## 2018-12-05 ENCOUNTER — ANESTHESIA EVENT (INPATIENT)
Dept: NON INVASIVE DIAGNOSTICS | Facility: HOSPITAL | Age: 58
DRG: 194 | End: 2018-12-05
Payer: COMMERCIAL

## 2018-12-05 LAB
ANION GAP SERPL CALCULATED.3IONS-SCNC: 5 MMOL/L (ref 4–13)
BUN SERPL-MCNC: 9 MG/DL (ref 5–25)
CALCIUM SERPL-MCNC: 7.9 MG/DL (ref 8.3–10.1)
CHLORIDE SERPL-SCNC: 97 MMOL/L (ref 100–108)
CO2 SERPL-SCNC: 35 MMOL/L (ref 21–32)
CREAT SERPL-MCNC: 0.58 MG/DL (ref 0.6–1.3)
ERYTHROCYTE [DISTWIDTH] IN BLOOD BY AUTOMATED COUNT: 19.8 % (ref 11.6–15.1)
GFR SERPL CREATININE-BSD FRML MDRD: 101 ML/MIN/1.73SQ M
GLUCOSE SERPL-MCNC: 128 MG/DL (ref 65–140)
GLUCOSE SERPL-MCNC: 133 MG/DL (ref 65–140)
GLUCOSE SERPL-MCNC: 248 MG/DL (ref 65–140)
GLUCOSE SERPL-MCNC: 253 MG/DL (ref 65–140)
HCT VFR BLD AUTO: 34.3 % (ref 34.8–46.1)
HGB BLD-MCNC: 10.2 G/DL (ref 11.5–15.4)
MCH RBC QN AUTO: 20.6 PG (ref 26.8–34.3)
MCHC RBC AUTO-ENTMCNC: 29.7 G/DL (ref 31.4–37.4)
MCV RBC AUTO: 69 FL (ref 82–98)
PLATELET # BLD AUTO: 303 THOUSANDS/UL (ref 149–390)
PMV BLD AUTO: 10.4 FL (ref 8.9–12.7)
POTASSIUM SERPL-SCNC: 3 MMOL/L (ref 3.5–5.3)
RBC # BLD AUTO: 4.94 MILLION/UL (ref 3.81–5.12)
SODIUM SERPL-SCNC: 137 MMOL/L (ref 136–145)
WBC # BLD AUTO: 8.95 THOUSAND/UL (ref 4.31–10.16)

## 2018-12-05 PROCEDURE — 93312 ECHO TRANSESOPHAGEAL: CPT

## 2018-12-05 PROCEDURE — 80048 BASIC METABOLIC PNL TOTAL CA: CPT | Performed by: INTERNAL MEDICINE

## 2018-12-05 PROCEDURE — 92960 CARDIOVERSION ELECTRIC EXT: CPT

## 2018-12-05 PROCEDURE — 82948 REAGENT STRIP/BLOOD GLUCOSE: CPT

## 2018-12-05 PROCEDURE — 93320 DOPPLER ECHO COMPLETE: CPT | Performed by: INTERNAL MEDICINE

## 2018-12-05 PROCEDURE — 94760 N-INVAS EAR/PLS OXIMETRY 1: CPT

## 2018-12-05 PROCEDURE — 85027 COMPLETE CBC AUTOMATED: CPT | Performed by: INTERNAL MEDICINE

## 2018-12-05 PROCEDURE — 94640 AIRWAY INHALATION TREATMENT: CPT

## 2018-12-05 PROCEDURE — 93312 ECHO TRANSESOPHAGEAL: CPT | Performed by: INTERNAL MEDICINE

## 2018-12-05 PROCEDURE — 94660 CPAP INITIATION&MGMT: CPT

## 2018-12-05 PROCEDURE — 93325 DOPPLER ECHO COLOR FLOW MAPG: CPT | Performed by: INTERNAL MEDICINE

## 2018-12-05 PROCEDURE — 99232 SBSQ HOSP IP/OBS MODERATE 35: CPT | Performed by: INTERNAL MEDICINE

## 2018-12-05 RX ORDER — FLECAINIDE ACETATE 100 MG/1
100 TABLET ORAL EVERY 12 HOURS SCHEDULED
Status: DISCONTINUED | OUTPATIENT
Start: 2018-12-05 | End: 2018-12-07 | Stop reason: HOSPADM

## 2018-12-05 RX ORDER — PROPOFOL 10 MG/ML
INJECTION, EMULSION INTRAVENOUS AS NEEDED
Status: DISCONTINUED | OUTPATIENT
Start: 2018-12-05 | End: 2018-12-05 | Stop reason: SURG

## 2018-12-05 RX ORDER — SODIUM CHLORIDE 9 MG/ML
INJECTION, SOLUTION INTRAVENOUS CONTINUOUS PRN
Status: DISCONTINUED | OUTPATIENT
Start: 2018-12-05 | End: 2018-12-05 | Stop reason: SURG

## 2018-12-05 RX ORDER — ONDANSETRON 2 MG/ML
4 INJECTION INTRAMUSCULAR; INTRAVENOUS ONCE AS NEEDED
Status: DISCONTINUED | OUTPATIENT
Start: 2018-12-05 | End: 2018-12-07 | Stop reason: HOSPADM

## 2018-12-05 RX ORDER — POTASSIUM CHLORIDE 14.9 MG/ML
20 INJECTION INTRAVENOUS ONCE
Status: COMPLETED | OUTPATIENT
Start: 2018-12-05 | End: 2018-12-05

## 2018-12-05 RX ORDER — FUROSEMIDE 40 MG/1
40 TABLET ORAL ONCE
Status: COMPLETED | OUTPATIENT
Start: 2018-12-05 | End: 2018-12-05

## 2018-12-05 RX ORDER — POTASSIUM CHLORIDE 14.9 MG/ML
20 INJECTION INTRAVENOUS ONCE
Status: DISCONTINUED | OUTPATIENT
Start: 2018-12-05 | End: 2018-12-05

## 2018-12-05 RX ADMIN — PREDNISONE 20 MG: 20 TABLET ORAL at 15:15

## 2018-12-05 RX ADMIN — PROPOFOL 40 MG: 10 INJECTION, EMULSION INTRAVENOUS at 13:35

## 2018-12-05 RX ADMIN — VITAMIN D, TAB 1000IU (100/BT) 1000 UNITS: 25 TAB at 15:15

## 2018-12-05 RX ADMIN — FLECAINIDE ACETATE 100 MG: 100 TABLET ORAL at 15:15

## 2018-12-05 RX ADMIN — INSULIN LISPRO 2 UNITS: 100 INJECTION, SOLUTION INTRAVENOUS; SUBCUTANEOUS at 23:07

## 2018-12-05 RX ADMIN — LORAZEPAM 1 MG: 1 TABLET ORAL at 23:01

## 2018-12-05 RX ADMIN — PANTOPRAZOLE SODIUM 40 MG: 40 TABLET, DELAYED RELEASE ORAL at 06:45

## 2018-12-05 RX ADMIN — APIXABAN 5 MG: 5 TABLET, FILM COATED ORAL at 12:58

## 2018-12-05 RX ADMIN — FLUTICASONE FUROATE AND VILANTEROL TRIFENATATE 1 PUFF: 100; 25 POWDER RESPIRATORY (INHALATION) at 08:51

## 2018-12-05 RX ADMIN — FUROSEMIDE 40 MG: 40 TABLET ORAL at 15:15

## 2018-12-05 RX ADMIN — FLECAINIDE ACETATE 100 MG: 100 TABLET ORAL at 20:53

## 2018-12-05 RX ADMIN — FUROSEMIDE 40 MG: 40 TABLET ORAL at 20:52

## 2018-12-05 RX ADMIN — PROPOFOL 30 MG: 10 INJECTION, EMULSION INTRAVENOUS at 13:25

## 2018-12-05 RX ADMIN — ROPINIROLE 2 MG: 2 TABLET, FILM COATED ORAL at 23:02

## 2018-12-05 RX ADMIN — TRAMADOL HYDROCHLORIDE 100 MG: 50 TABLET, COATED ORAL at 15:14

## 2018-12-05 RX ADMIN — APIXABAN 5 MG: 5 TABLET, FILM COATED ORAL at 20:53

## 2018-12-05 RX ADMIN — OXYCODONE HYDROCHLORIDE AND ACETAMINOPHEN 1000 MG: 500 TABLET ORAL at 15:14

## 2018-12-05 RX ADMIN — SODIUM CHLORIDE: 0.9 INJECTION, SOLUTION INTRAVENOUS at 13:07

## 2018-12-05 RX ADMIN — POTASSIUM CHLORIDE 20 MEQ: 200 INJECTION, SOLUTION INTRAVENOUS at 12:18

## 2018-12-05 RX ADMIN — METOPROLOL SUCCINATE 50 MG: 50 TABLET, EXTENDED RELEASE ORAL at 15:15

## 2018-12-05 RX ADMIN — PROPOFOL 50 MG: 10 INJECTION, EMULSION INTRAVENOUS at 13:10

## 2018-12-05 RX ADMIN — TRAZODONE HYDROCHLORIDE 150 MG: 150 TABLET ORAL at 23:02

## 2018-12-05 RX ADMIN — INSULIN LISPRO 4 UNITS: 100 INJECTION, SOLUTION INTRAVENOUS; SUBCUTANEOUS at 16:47

## 2018-12-05 RX ADMIN — POTASSIUM CHLORIDE 20 MEQ: 1500 TABLET, EXTENDED RELEASE ORAL at 17:59

## 2018-12-05 RX ADMIN — POTASSIUM CHLORIDE 20 MEQ: 1500 TABLET, EXTENDED RELEASE ORAL at 15:15

## 2018-12-05 RX ADMIN — PROPOFOL 30 MG: 10 INJECTION, EMULSION INTRAVENOUS at 13:15

## 2018-12-05 NOTE — ASSESSMENT & PLAN NOTE
Lab Results   Component Value Date    HGBA1C 6 8 (H) 09/22/2017       Recent Labs      12/04/18   1326  12/04/18   1645  12/04/18   2116  12/05/18   0622   POCGLU  221*  302*  270*  133       Blood Sugar Average: Last 72 hrs:  (P) 240 [id number]     Blood sugars were uncontrolled due to steroids brother improving, she is NPO before MITCH

## 2018-12-05 NOTE — NURSING NOTE
Pt in NCV for MITCH/CV  Procedure explained, all questions answered  Pt will be monitored by anesthesia for procedure, consents signed

## 2018-12-05 NOTE — ASSESSMENT & PLAN NOTE
Patient's asthma exacerbation is improving, she was switched to p   O  Prednisone, I heard no wheezing to

## 2018-12-05 NOTE — PROGRESS NOTES
MITCH CV    Normal LV function  Moderate TR  No LA thrombus  Successful Cardioversion 300J from atrial flutter to normal sinus rhythm  Add Flecainide as AAT  Continue anticoagulation

## 2018-12-05 NOTE — ASSESSMENT & PLAN NOTE
Acute hypoxic respiratory failure was present on admission and  resolved with diuresis and treatment of asthma exacerbation

## 2018-12-05 NOTE — ASSESSMENT & PLAN NOTE
Acute diastolic CHF was present admission due to rapid atrial flutter  Patient stable on p o   Lasix

## 2018-12-05 NOTE — ASSESSMENT & PLAN NOTE
Patient currently is in atrial flutter with heart rates in the 80s will continue diltiazem and metoprolol  Await MITCH guided later today    Is on Eliquis for CVA prophylaxis

## 2018-12-05 NOTE — PROGRESS NOTES
Progress Note - Lyudmila A[address] 1960, 59 y o  female MRN: [id number]    Unit/Bed#: -02 Encounter: [id number]    Primary Care Provider: Sylwia Peterson MD   Date and time admitted to hospital: 2018  2:10 PM        * Acute respiratory failure with hypoxia ([facility] [address] )   Assessment & Plan    Acute hypoxic respiratory failure was present on admission and  resolved with diuresis and treatment of asthma exacerbation     Atrial flutter [facility])   Assessment & Plan    Patient currently is in atrial flutter with heart rates in the 80s will continue diltiazem and metoprolol  Await MITCH guided later today  Is on Eliquis for CVA prophylaxis     Acute diastolic congestive heart failure ([facility] )   Assessment & Plan    Acute diastolic CHF was present admission due to rapid atrial flutter  Patient stable on p o  Lasix     Severe persistent asthma with acute exacerbation   Assessment & Plan    Patient's asthma exacerbation is improving, she was switched to p   O  Prednisone, I heard no wheezing to     Type 2 diabetes mellitus, without long-term current use of insulin [facility])   Assessment & Plan    Lab Results   Component Value Date    HGBA1C 6 8 (H) 2017       Recent Labs      18   1326  18   1645  18   2116  18   0622   POCGLU  221*  302*  270*  133       Blood Sugar Average: Last 72 hrs:  (P) 240 [id number]     Blood sugars were uncontrolled due to steroids brother improving, she is NPO before MITCH         VTE Prophylaxis: in place    Patient Centered Rounds: I rounded with patient's nurse    Current Length of Stay: 5 day(s)    Current Patient Status: Inpatient    Certification Statement: Pt requires additional inpatient hospital stay due to: see assessment and plan        Subjective:   Patient denies chest pain, shortness of breath, wheezing, palpitations, abdominal pain, signs of bleeding    All other ROS are negative    Objective:     Vitals:   Temp (24hrs), Av °F (36 7 °C), Min:97 5 °F (36 4 °C), Max:98 3 °F (36 8 °C)    Temp:  [97 5 °F (36 4 °C)-98 3 °F (36 8 °C)] 98 3 °F (36 8 °C)  HR:  [] 99  Resp:  [16-20] 16  BP: (104-135)/(60-83) 120/79  SpO2:  [92 %-95 %] 95 %  Body mass index is 44 79 kg/m²  Input and Output Summary (last 24 hours): Intake/Output Summary (Last 24 hours) at 12/05/18 1246  Last data filed at 12/05/18 1131   Gross per 24 hour   Intake                0 ml   Output             3225 ml   Net            -3225 ml       Physical Exam:     Physical Exam   Constitutional: She is oriented to person, place, and time  She appears well-developed  No distress  HENT:   Head: Normocephalic  Mouth/Throat: Oropharynx is clear and moist    Eyes: Conjunctivae are normal    Neck: Neck supple  Cardiovascular: Normal rate  Irregularly irregular there is trace pedal edema   Pulmonary/Chest: Effort normal  No respiratory distress  She has no wheezes  She has no rales  I heard no expiratory wheezing today or inspiratory crackles   Abdominal: Soft  Bowel sounds are normal  She exhibits no distension  There is no tenderness  Musculoskeletal: She exhibits no tenderness  Lymphadenopathy:     She has no cervical adenopathy  Neurological: She is alert and oriented to person, place, and time  No cranial nerve deficit  Skin: Skin is warm and dry  No rash noted  Psychiatric: She has a normal mood and affect  Vitals reviewed  I personally reviewed labs and imaging reports for today        Last 24 Hours Medication List:     Current Facility-Administered Medications:  acetaminophen 650 mg Oral Q4H PRN Bud Margi, CRNP    albuterol 2 puff Inhalation Q4H PRN Bud Margi, CRNP    apixaban 5 mg Oral BID Irma Fly, DO    ascorbic acid 1,000 mg Oral Daily Bud Margi, CRNP    cholecalciferol 1,000 Units Oral Daily VICKY Cohen    diltiazem 240 mg Oral Daily Irma Fly, DO    fentaNYL 25 mcg Intravenous Q5 Min PRN Frankie Jaquez, DO fluticasone-vilanterol 1 puff Inhalation Daily Radha Garzon, DO    furosemide 40 mg Oral BID (diuretic) Irma Fly, DO    guaiFENesin 200 mg Oral Q4H PRN VICKY Rosa    influenza vaccine 0 5 mL Intramuscular Prior to discharge Sarah Robledo MD    insulin lispro 1-5 Units Subcutaneous HS VICKY Cohen    insulin lispro 2-12 Units Subcutaneous TID AC Wilbur Norman MD    levalbuterol 0 63 mg Nebulization Q6H PRN VICKY Rosa    LORazepam 1 mg Oral HS VICKY Cohen    metoprolol 5 mg Intravenous Q6H PRN VICKY Rosa    metoprolol succinate 50 mg Oral Daily VICKY Cohen    ondansetron 4 mg Intravenous Q6H PRN VICKY Rosa    ondansetron 4 mg Intravenous Once PRN Christianne Elmore,     pantoprazole 40 mg Oral Early Morning VICKY Cohen    pneumococcal 13-valent conjugate vaccine 0 5 mL Intramuscular Prior to discharge Sarah Robledo MD    potassium chloride 20 mEq Oral BID Irma Fly, DO    potassium chloride 20 mEq Intravenous Once Meryl Eagle MD Last Rate: 20 mEq (12/05/18 1218)   predniSONE 20 mg Oral Daily Irma Fly, DO    rOPINIRole 2 mg Oral HS VICKY Cohen    traMADol 100 mg Oral Q12H Wilbur Norman MD    traZODone 150 mg Oral HS VICKY Rosenberg           Today, Patient Was Seen By: Meryl Eagle MD    ** Please Note: Dictation voice to text software may have been used in the creation of this document   **

## 2018-12-05 NOTE — ANESTHESIA PREPROCEDURE EVALUATION
Review of Systems/Medical History  Patient summary reviewed  Chart reviewed      Cardiovascular  Hyperlipidemia, Hypertension controlled, Dysrhythmias , atrial flutter, CHF ,   Comment: Admitted with acuter hypoxic respiratory failure, Aflutter, chf and exacerbation of asthma,  Pulmonary  Smoker cigarette smoker  , Pneumonia, Asthma , Sleep apnea ,        GI/Hepatic    GERD , Bariatric surgery,   Comment: Increased lft's  6 pack a day etoh     Negative  ROS        Endo/Other  Diabetes poorly controlled type 2 ,      GYN  Negative gynecology ROS          Hematology  Anemia ,     Musculoskeletal    Comment: Cervical radiculopathy with parasthesias and weakness upper extremities  Arthritis     Neurology    Paresthesias,    Psychology   Anxiety,              Physical Exam    Airway    Mallampati score: II  TM Distance: <3 FB  Neck ROM: limited     Dental       Cardiovascular  Rhythm: regular, Rate: normal, Cardiovascular exam normal    Pulmonary  Decreased breath sounds,     Other Findings        Anesthesia Plan  ASA Score- 3     Anesthesia Type- IV sedation with anesthesia with ASA Monitors  Additional Monitors:   Airway Plan:         Plan Factors-    Induction- intravenous  Postoperative Plan-     Informed Consent- Anesthetic plan and risks discussed with patient  I personally reviewed this patient with the CRNA  Discussed and agreed on the Anesthesia Plan with the CRNA  Caity Black

## 2018-12-06 LAB
ATRIAL RATE: 90 BPM
GLUCOSE SERPL-MCNC: 130 MG/DL (ref 65–140)
GLUCOSE SERPL-MCNC: 135 MG/DL (ref 65–140)
GLUCOSE SERPL-MCNC: 203 MG/DL (ref 65–140)
GLUCOSE SERPL-MCNC: 262 MG/DL (ref 65–140)
P AXIS: 88 DEGREES
PR INTERVAL: 182 MS
QRS AXIS: 89 DEGREES
QRSD INTERVAL: 76 MS
QT INTERVAL: 368 MS
QTC INTERVAL: 450 MS
T WAVE AXIS: 83 DEGREES
VENTRICULAR RATE: 90 BPM

## 2018-12-06 PROCEDURE — 94660 CPAP INITIATION&MGMT: CPT

## 2018-12-06 PROCEDURE — 93005 ELECTROCARDIOGRAM TRACING: CPT

## 2018-12-06 PROCEDURE — 93010 ELECTROCARDIOGRAM REPORT: CPT | Performed by: INTERNAL MEDICINE

## 2018-12-06 PROCEDURE — 90682 RIV4 VACC RECOMBINANT DNA IM: CPT | Performed by: HOSPITALIST

## 2018-12-06 PROCEDURE — 92960 CARDIOVERSION ELECTRIC EXT: CPT | Performed by: INTERNAL MEDICINE

## 2018-12-06 PROCEDURE — 99232 SBSQ HOSP IP/OBS MODERATE 35: CPT | Performed by: INTERNAL MEDICINE

## 2018-12-06 PROCEDURE — 94640 AIRWAY INHALATION TREATMENT: CPT

## 2018-12-06 PROCEDURE — 94760 N-INVAS EAR/PLS OXIMETRY 1: CPT

## 2018-12-06 PROCEDURE — 82948 REAGENT STRIP/BLOOD GLUCOSE: CPT

## 2018-12-06 PROCEDURE — 90670 PCV13 VACCINE IM: CPT | Performed by: HOSPITALIST

## 2018-12-06 RX ORDER — INSULIN GLARGINE 100 [IU]/ML
10 INJECTION, SOLUTION SUBCUTANEOUS
Status: DISCONTINUED | OUTPATIENT
Start: 2018-12-06 | End: 2018-12-07 | Stop reason: HOSPADM

## 2018-12-06 RX ADMIN — POTASSIUM CHLORIDE 20 MEQ: 1500 TABLET, EXTENDED RELEASE ORAL at 08:37

## 2018-12-06 RX ADMIN — VITAMIN D, TAB 1000IU (100/BT) 1000 UNITS: 25 TAB at 08:37

## 2018-12-06 RX ADMIN — FUROSEMIDE 40 MG: 40 TABLET ORAL at 08:37

## 2018-12-06 RX ADMIN — INSULIN LISPRO 4 UNITS: 100 INJECTION, SOLUTION INTRAVENOUS; SUBCUTANEOUS at 12:39

## 2018-12-06 RX ADMIN — PNEUMOCOCCAL 13-VALENT CONJUGATE VACCINE 0.5 ML: 2.2; 2.2; 2.2; 2.2; 2.2; 4.4; 2.2; 2.2; 2.2; 2.2; 2.2; 2.2; 2.2 INJECTION, SUSPENSION INTRAMUSCULAR at 18:34

## 2018-12-06 RX ADMIN — TRAZODONE HYDROCHLORIDE 150 MG: 150 TABLET ORAL at 21:06

## 2018-12-06 RX ADMIN — FLUTICASONE FUROATE AND VILANTEROL TRIFENATATE 1 PUFF: 100; 25 POWDER RESPIRATORY (INHALATION) at 09:03

## 2018-12-06 RX ADMIN — TRAMADOL HYDROCHLORIDE 100 MG: 50 TABLET, COATED ORAL at 21:06

## 2018-12-06 RX ADMIN — FLECAINIDE ACETATE 100 MG: 100 TABLET ORAL at 21:06

## 2018-12-06 RX ADMIN — OXYCODONE HYDROCHLORIDE AND ACETAMINOPHEN 1000 MG: 500 TABLET ORAL at 08:38

## 2018-12-06 RX ADMIN — PANTOPRAZOLE SODIUM 40 MG: 40 TABLET, DELAYED RELEASE ORAL at 05:24

## 2018-12-06 RX ADMIN — APIXABAN 5 MG: 5 TABLET, FILM COATED ORAL at 08:37

## 2018-12-06 RX ADMIN — INSULIN GLARGINE 10 UNITS: 100 INJECTION, SOLUTION SUBCUTANEOUS at 21:05

## 2018-12-06 RX ADMIN — INSULIN LISPRO 3 UNITS: 100 INJECTION, SOLUTION INTRAVENOUS; SUBCUTANEOUS at 12:38

## 2018-12-06 RX ADMIN — APIXABAN 5 MG: 5 TABLET, FILM COATED ORAL at 17:28

## 2018-12-06 RX ADMIN — INSULIN LISPRO 6 UNITS: 100 INJECTION, SOLUTION INTRAVENOUS; SUBCUTANEOUS at 17:29

## 2018-12-06 RX ADMIN — FLECAINIDE ACETATE 100 MG: 100 TABLET ORAL at 08:37

## 2018-12-06 RX ADMIN — INSULIN LISPRO 3 UNITS: 100 INJECTION, SOLUTION INTRAVENOUS; SUBCUTANEOUS at 17:28

## 2018-12-06 RX ADMIN — LORAZEPAM 1 MG: 1 TABLET ORAL at 21:06

## 2018-12-06 RX ADMIN — INSULIN LISPRO 3 UNITS: 100 INJECTION, SOLUTION INTRAVENOUS; SUBCUTANEOUS at 08:38

## 2018-12-06 RX ADMIN — INFLUENZA A VIRUS A/MICHIGAN/45/2015 (H1N1) RECOMBINANT HEMAGGLUTININ ANTIGEN, INFLUENZA A VIRUS A/SINGAPORE/INFIMH-16-0019/2016 (H3N2) RECOMBINANT HEMAGGLUTININ ANTIGEN, INFLUENZA B VIRUS B/MARYLAND/15/2016 RECOMBINANT HEMAGGLUTININ ANTIGEN, AND INFLUENZA B VIRUS B/PHUKET/3073/2013 RECOMBINANT HEMAGGLUTININ ANTIGEN 0.5 ML: 45; 45; 45; 45 INJECTION INTRAMUSCULAR at 17:29

## 2018-12-06 RX ADMIN — FUROSEMIDE 40 MG: 40 TABLET ORAL at 17:28

## 2018-12-06 RX ADMIN — TRAMADOL HYDROCHLORIDE 100 MG: 50 TABLET, COATED ORAL at 08:37

## 2018-12-06 RX ADMIN — ROPINIROLE 2 MG: 2 TABLET, FILM COATED ORAL at 21:06

## 2018-12-06 RX ADMIN — POTASSIUM CHLORIDE 20 MEQ: 1500 TABLET, EXTENDED RELEASE ORAL at 17:28

## 2018-12-06 RX ADMIN — METOPROLOL SUCCINATE 50 MG: 50 TABLET, EXTENDED RELEASE ORAL at 08:37

## 2018-12-06 RX ADMIN — PREDNISONE 20 MG: 20 TABLET ORAL at 08:36

## 2018-12-06 NOTE — NURSING NOTE
Pt  observed to be sleeping comfortably for almost 100% of comfort rounds, getting up just once to urinate  WCTM

## 2018-12-06 NOTE — ASSESSMENT & PLAN NOTE
Lab Results   Component Value Date    HGBA1C 6 8 (H) 09/22/2017       Recent Labs      12/05/18   0622  12/05/18   1546  12/05/18   2212  12/06/18   0807   POCGLU  133  248*  253*  135       Blood Sugar Average: Last 72 hrs:  (P) 221 [id number]     Blood sugars were uncontrolled due to steroids    Will plan on resuming metformin on discharge

## 2018-12-06 NOTE — SOCIAL WORK
Continuing to follow patient, met with patient and provided a 7 day sample supply of Eliquis and a coupon for a free one month's supply of Eliquis  Patient hopes to be on Eliquis for only a month  Provided her with phone number to [facility] and phone numer to follow up on receiving assistance if needed  She plans to go home and anticipates no other services

## 2018-12-06 NOTE — PROGRESS NOTES
Progress Note - Abril Reusing 1960, 59 y o  female MRN: [id number]    Unit/Bed#: [address] [place] [address] Encounter: [id number]    Primary Care Provider: Brown Goode MD   Date and time admitted to hospital: 11/30/2018  2:10 PM        * Acute respiratory failure with hypoxia ([facility] [address] )   Assessment & Plan    Acute hypoxic respiratory failure was present on admission and  resolved with diuresis and treatment of asthma exacerbation     Atrial flutter [facility])   Assessment & Plan    Patient underwent successful MITCH guided cardioversion yesterday  She remains in sinus rhythm with normal QRS and QTC intervals on p  O  Flecainide on EKG die ordered this morning  Will discuss with Cardiology whether patient can be discharged today      Acute diastolic congestive heart failure ([facility] )   Assessment & Plan    Acute diastolic CHF was present admission due to rapid atrial flutter  Patient stable on p o  Lasix     Severe persistent asthma with acute exacerbation   Assessment & Plan    Patient's asthma exacerbation resolved, will continue albuterol, Breo and steroid taper     Type 2 diabetes mellitus, without long-term current use of insulin [facility])   Assessment & Plan    Lab Results   Component Value Date    HGBA1C 6 8 (H) 09/22/2017       Recent Labs      12/05/18   0622  12/05/18   1546  12/05/18   2212  12/06/18   0807   POCGLU  133  248*  253*  135       Blood Sugar Average: Last 72 hrs:  (P) 221 [id number]     Blood sugars were uncontrolled due to steroids    Will plan on resuming metformin on discharge         VTE Prophylaxis: in place    Patient Centered Rounds: I rounded with patient's nurse    Current Length of Stay: 6 day(s)    Current Patient Status: Inpatient    Certification Statement: Pt requires additional inpatient hospital stay due to: see assessment and plan        Subjective:   Patient feels well this morning without any chest pain, shortness of breath, wheezing, cough, palpitations, abdominal pain, bleeding    All other ROS are negative    Objective:     Vitals:   Temp (24hrs), Av 1 °F (36 7 °C), Min:98 °F (36 7 °C), Max:98 3 °F (36 8 °C)    Temp:  [98 °F (36 7 °C)-98 3 °F (36 8 °C)] 98 1 °F (36 7 °C)  HR:  [] 85  Resp:  [15-21] 18  BP: (120-161)/(63-97) 161/97  SpO2:  [94 %-98 %] 94 %  Body mass index is 44 79 kg/m²  Input and Output Summary (last 24 hours): Intake/Output Summary (Last 24 hours) at 18 0834  Last data filed at 18 1606   Gross per 24 hour   Intake              300 ml   Output              925 ml   Net             -625 ml       Physical Exam:     Physical Exam   Constitutional: She is oriented to person, place, and time  She appears well-developed  No distress  HENT:   Head: Normocephalic  Mouth/Throat: Oropharynx is clear and moist    Eyes: Conjunctivae are normal    Neck: Neck supple  Cardiovascular: Normal rate and regular rhythm  There is trace pedal edema   Pulmonary/Chest: Effort normal  No respiratory distress  She has no wheezes  She has no rales  Abdominal: Soft  Bowel sounds are normal  She exhibits no distension  There is no tenderness  Musculoskeletal: She exhibits no tenderness  Neurological: She is alert and oriented to person, place, and time  No cranial nerve deficit  Skin: Skin is warm and dry  No rash noted  Psychiatric: She has a normal mood and affect  Vitals reviewed  I personally reviewed labs and imaging reports for today        Last 24 Hours Medication List:     Current Facility-Administered Medications:  acetaminophen 650 mg Oral Q4H PRN [employer], CRNP   albuterol 2 puff Inhalation Q4H PRN [employer], CRNP   apixaban 5 mg Oral BID Iram Elder, DO   ascorbic acid 1,000 mg Oral Daily [employer], CRNP   cholecalciferol 1,000 Units Oral Daily VICKY Cohen   fentaNYL 25 mcg Intravenous Q5 Min PRN Ashok Stephen, DO   flecainide 100 mg Oral Q12H Scott Contreras MD   fluticasone-vilanterol 1 puff Inhalation Daily Radha Garzon,    furosemide 40 mg Oral BID (diuretic) Irma Fly, DO   guaiFENesin 200 mg Oral Q4H PRN VICKY Ayon   influenza vaccine 0 5 mL Intramuscular Prior to discharge Minal Robbins MD   insulin glargine 10 Units Subcutaneous HS Tommy Mayer MD   insulin lispro 1-5 Units Subcutaneous HS VICKY Cohen   insulin lispro 2-12 Units Subcutaneous TID AC Carmen Johnson MD   insulin lispro 3 Units Subcutaneous TID With Meals Tommy Mayer MD   levalbuterol 0 63 mg Nebulization Q6H PRN Uziel VICKY Sr   LORazepam 1 mg Oral HS VICKY Cohen   metoprolol 5 mg Intravenous Q6H PRN Uziel Peace, VICKY   metoprolol succinate 50 mg Oral Daily VICKY Cohen   ondansetron 4 mg Intravenous Q6H PRN VICKY Bentley   ondansetron 4 mg Intravenous Once PRN Cris Santana, DO   ondansetron 4 mg Intravenous Once PRN Cris Santana, DO   pantoprazole 40 mg Oral Early Morning VICKY Cohen   pneumococcal 13-valent conjugate vaccine 0 5 mL Intramuscular Prior to discharge Minal Robbins MD   potassium chloride 20 mEq Oral BID Irma Fly, DO   predniSONE 20 mg Oral Daily Irma Fly, DO   rOPINIRole 2 mg Oral HS VICKY Cohen   traMADol 100 mg Oral Q12H Carmen Johnson MD   traZODone 150 mg Oral HS VICKY Ayon          Today, Patient Was Seen By: Tommy Mayer MD    ** Please Note: Dictation voice to text software may have been used in the creation of this document   **

## 2018-12-06 NOTE — ASSESSMENT & PLAN NOTE
Patient underwent successful MITCH guided cardioversion yesterday  She remains in sinus rhythm with normal QRS and QTC intervals on p  O  Flecainide on EKG die ordered this morning    Will discuss with Cardiology whether patient can be discharged today

## 2018-12-06 NOTE — PROGRESS NOTES
Cardiology Progress Note - Dolores Ayala 59 y o  female MRN: [id number]    Unit/Bed#: [address] [place] [address] 210-01 Encounter: [id number]      Assessment:  Principal Problem:    Acute respiratory failure with hypoxia ([address] )  Active Problems:    Benign essential hypertension    Obstructive sleep apnea    Hypercholesterolemia    Severe persistent asthma with acute exacerbation    Tobacco use    Type 2 diabetes mellitus, without long-term current use of insulin (HCC)    Acute diastolic congestive heart failure (HCC)    Atrial flutter (HCC)    Abnormal computed tomography angiography (CTA) of abdomen    Abnormal CT of the abdomen    Iron deficiency anemia due to chronic blood loss    Hypokalemia      Plan:    Patient remains in sinus rhythm, status post MITCH guided cardioversion  Continue all medications prescribed  Flecainide added  Close outpatient follow-up  Replete potassium  Continue all medications including Eliquis for stroke prevention  Subjective:   Patient seen and examined  No significant events overnight  Patient denies any cardiovascular symptoms  Objective:     Vitals: Blood pressure 161/97, pulse 85, temperature 98 1 °F (36 7 °C), temperature source Oral, resp  rate 18, height 5' 3" (1 6 m), weight 116 kg (254 lb 13 6 oz), SpO2 95 %  , Body mass index is 45 14 kg/m² , Orthostatic Blood Pressures      Most Recent Value   Blood Pressure  161/97 filed at 12/06/2018 9046   Patient Position - Orthostatic VS  Sitting filed at 12/06/2018 0752            Intake/Output Summary (Last 24 hours) at 12/06/18 1510  Last data filed at 12/05/18 1606   Gross per 24 hour   Intake                0 ml   Output              350 ml   Net             -350 ml       No significant arrhythmias seen on telemetry review    Sinus rhythm      Physical Exam:    GEN: Dolores Ayala appears well, alert and oriented x 3, pleasant and cooperative   HEENT: pupils equal, round, and reactive to light; extraocular muscles intact  NECK: supple, no carotid bruits   HEART: regular rhythm, normal S1 and S2, no murmurs, clicks, gallops or rubs   LUNGS: clear to auscultation bilaterally; no wheezes, rales, or rhonchi   ABDOMEN: normal bowel sounds, soft, no tenderness, no distention  EXTREMITIES: peripheral pulses normal; no clubbing, cyanosis, or edema  NEURO: no focal findings   SKIN: normal without suspicious lesions on exposed skin    Medications:      Current Facility-Administered Medications:     acetaminophen (TYLENOL) tablet 650 mg, 650 mg, Oral, Q4H PRN, Janelle Elizabeth, CRNP, 650 mg at 12/02/18 1221    albuterol (PROVENTIL HFA,VENTOLIN HFA) inhaler 2 puff, 2 puff, Inhalation, Q4H PRN, SHARDA OsunaNP    apixaban (ELIQUIS) tablet 5 mg, 5 mg, Oral, BID, Irma Fly, DO, 5 mg at 12/06/18 8262    ascorbic acid (VITAMIN C) tablet 1,000 mg, 1,000 mg, Oral, Daily, Janelle Elizabeth, CRNP, 1,000 mg at 12/06/18 6831    cholecalciferol (VITAMIN D3) tablet 1,000 Units, 1,000 Units, Oral, Daily, Janelle Elizabeth, CRNP, 1,000 Units at 12/06/18 0837    fentaNYL (SUBLIMAZE) injection 25 mcg, 25 mcg, Intravenous, Q5 Min PRN, Bruno Durano, DO    flecainide (TAMBOCOR) tablet 100 mg, 100 mg, Oral, Q12H [address], Gerald Guerrero MD, 100 mg at 12/06/18 0837    fluticasone-vilanterol (BREO ELLIPTA) 100-25 mcg/inh inhaler 1 puff, 1 puff, Inhalation, Daily, Madonna Snare, DO, 1 puff at 12/06/18 0903    furosemide (LASIX) tablet 40 mg, 40 mg, Oral, BID (diuretic), Irma Fly, DO, 40 mg at 12/06/18 9672    guaiFENesin (ROBITUSSIN) oral solution 200 mg, 200 mg, Oral, Q4H PRN, Janelle SHARDA JohnsonNP    influenza vaccine, recombinant, quadrivalent (FLUBLOK) IM injection 0 5 mL, 0 5 mL, Intramuscular, Prior to discharge, Gema Gilmore MD    insulin glargine (LANTUS) subcutaneous injection 10 Units 0 1 mL, 10 Units, Subcutaneous, HS, Nilda Hernandez MD    insulin lispro (HumaLOG) 100 units/mL subcutaneous injection 1-5 Units, 1-5 Units, Subcutaneous, HS, Janelle Johnson, CRNP, 2 Units at 12/05/18 2307    insulin lispro (HumaLOG) 100 units/mL subcutaneous injection 2-12 Units, 2-12 Units, Subcutaneous, TID AC, 4 Units at 12/06/18 1239 **AND** Fingerstick Glucose (POCT), , , TID AC, Hiram King MD    insulin lispro (HumaLOG) 100 units/mL subcutaneous injection 3 Units, 3 Units, Subcutaneous, TID With Meals, Syd Winn MD, 3 Units at 12/06/18 1238    levalbuterol (XOPENEX) inhalation solution 0 63 mg, 0 63 mg, Nebulization, Q6H PRN, VICKY Rothman, 0 63 mg at 12/03/18 1918    LORazepam (ATIVAN) tablet 1 mg, 1 mg, Oral, HS, VICKY Cohen, 1 mg at 12/05/18 2301    metoprolol (LOPRESSOR) injection 5 mg, 5 mg, Intravenous, Q6H PRN, VICKY Rothman, 5 mg at 12/01/18 1157    metoprolol succinate (TOPROL-XL) 24 hr tablet 50 mg, 50 mg, Oral, Daily, VICKY Cohen, 50 mg at 12/06/18 0837    ondansetron (ZOFRAN) injection 4 mg, 4 mg, Intravenous, Q6H PRN, VICKY Rothman    ondansetron (ZOFRAN) injection 4 mg, 4 mg, Intravenous, Once PRN, Rachel Deep, DO    ondansetron [facility]) injection 4 mg, 4 mg, Intravenous, Once PRN, Rachel Deep, DO    pantoprazole (PROTONIX) EC tablet 40 mg, 40 mg, Oral, Early Morning, VICKY Cohen, 40 mg at 12/06/18 0524    pneumococcal 13-valent conjugate vaccine (PREVNAR-13) IM injection 0 5 mL, 0 5 mL, Intramuscular, Prior to discharge, Neil Shepherd MD    potassium chloride (K-DUR,KLOR-CON) CR tablet 20 mEq, 20 mEq, Oral, BID, Irma Fly, DO, 20 mEq at 12/06/18 7910    predniSONE tablet 20 mg, 20 mg, Oral, Daily, Irma Friedman DO, 20 mg at 12/06/18 0836    rOPINIRole (REQUIP) tablet 2 mg, 2 mg, Oral, HS, VICKY Cohen, 2 mg at 12/05/18 2302    traMADol (ULTRAM) tablet 100 mg, 100 mg, Oral, Q12H, Hiram King MD, 100 mg at 12/06/18 0837    traZODone (DESYREL) tablet 150 mg, 150 mg, Oral, HS, VICKY Cohen, 150 mg at 12/05/18 2302     Labs & Results:      Results from last 7 days  Lab Units 12/01/18  0149 11/30/18  2303 11/30/18  1432   TROPONIN I ng/mL <0 02 <0 02 <0 02       Results from last 7 days  Lab Units 12/05/18  0513 12/04/18  0513 12/03/18  0450   WBC Thousand/uL 8 95 9 44 11 35*   HEMOGLOBIN g/dL 10 2* 10 6* 10 5*   HEMATOCRIT % 34 3* 35 3 34 7*   PLATELETS Thousands/uL 303 338 352       Results from last 7 days  Lab Units 12/01/18  0505   TRIGLYCERIDES mg/dL 54   HDL mg/dL 65*       Results from last 7 days  Lab Units 12/05/18  0513 12/04/18  0513 12/03/18  0450 12/02/18  0508 12/01/18  0505 11/30/18  1433   POTASSIUM mmol/L 3 0* 3 4* 3 2* 4 0 4 4 3 7   CHLORIDE mmol/L 97* 94* 90* 90* 93* 94*   CO2 mmol/L 35* 36* 37* 32 34* 36*   BUN mg/dL 9 9 8 12 8 7   CREATININE mg/dL 0 58* 0 73 0 64 0 80 0 74 0 73   CALCIUM mg/dL 7 9* 8 1* 8 3 8 4 8 1* 8 5   ALK PHOS U/L  --   --   --  128* 130* 138*   ALT U/L  --   --   --  46 51 59   AST U/L  --   --   --  18 23 32       Results from last 7 days  Lab Units 12/03/18  0450 12/02/18  0508 12/01/18  0505 11/30/18  1433   INR  1 48* 1 61* 1 27* 1 22*   PTT seconds  --   --   --  29       Results from last 7 days  Lab Units 12/02/18  0508 11/30/18  2303 11/30/18  1433   MAGNESIUM mg/dL 2 0 1 9 1 8         EKG personally reviewed by Daniel Chow MD   Initial ECG showed atrial flutter  Subsequent ECG showed sinus rhythm after cardioversion    Counseling / Coordination of Care  Total floor / unit time spent today 25 minutes  Greater than 50% of total time was spent with the patient and / or family counseling and / or coordination of care

## 2018-12-07 ENCOUNTER — TRANSITIONAL CARE MANAGEMENT (OUTPATIENT)
Dept: FAMILY MEDICINE CLINIC | Facility: HOSPITAL | Age: 58
End: 2018-12-07

## 2018-12-07 VITALS
SYSTOLIC BLOOD PRESSURE: 146 MMHG | HEIGHT: 63 IN | WEIGHT: 256.17 LBS | DIASTOLIC BLOOD PRESSURE: 82 MMHG | OXYGEN SATURATION: 96 % | TEMPERATURE: 98.3 F | BODY MASS INDEX: 45.39 KG/M2 | RESPIRATION RATE: 18 BRPM | HEART RATE: 98 BPM

## 2018-12-07 DIAGNOSIS — G25.81 RESTLESS LEGS SYNDROME (RLS): ICD-10-CM

## 2018-12-07 DIAGNOSIS — Z79.899 ENCOUNTER FOR LONG-TERM (CURRENT) DRUG USE: ICD-10-CM

## 2018-12-07 DIAGNOSIS — J45.51 SEVERE PERSISTENT ASTHMA WITH ACUTE EXACERBATION: ICD-10-CM

## 2018-12-07 PROBLEM — I48.92 ATRIAL FLUTTER (HCC): Status: RESOLVED | Noted: 2018-11-30 | Resolved: 2018-12-07

## 2018-12-07 PROBLEM — J96.01 ACUTE RESPIRATORY FAILURE WITH HYPOXIA (HCC): Status: RESOLVED | Noted: 2018-11-30 | Resolved: 2018-12-07

## 2018-12-07 LAB
GLUCOSE SERPL-MCNC: 147 MG/DL (ref 65–140)
GLUCOSE SERPL-MCNC: 277 MG/DL (ref 65–140)

## 2018-12-07 PROCEDURE — 94760 N-INVAS EAR/PLS OXIMETRY 1: CPT

## 2018-12-07 PROCEDURE — 99239 HOSP IP/OBS DSCHRG MGMT >30: CPT | Performed by: INTERNAL MEDICINE

## 2018-12-07 PROCEDURE — 82948 REAGENT STRIP/BLOOD GLUCOSE: CPT

## 2018-12-07 PROCEDURE — 94660 CPAP INITIATION&MGMT: CPT

## 2018-12-07 PROCEDURE — 94640 AIRWAY INHALATION TREATMENT: CPT

## 2018-12-07 RX ORDER — FUROSEMIDE 40 MG/1
40 TABLET ORAL 2 TIMES DAILY
Qty: 60 TABLET | Refills: 0 | Status: SHIPPED | OUTPATIENT
Start: 2018-12-07 | End: 2019-02-05 | Stop reason: SDUPTHER

## 2018-12-07 RX ORDER — PREDNISONE 20 MG/1
20 TABLET ORAL DAILY
Qty: 3 TABLET | Refills: 0 | Status: SHIPPED | OUTPATIENT
Start: 2018-12-08 | End: 2018-12-12

## 2018-12-07 RX ORDER — ROPINIROLE 2 MG/1
TABLET, FILM COATED ORAL
Qty: 30 TABLET | Refills: 0 | Status: SHIPPED | OUTPATIENT
Start: 2018-12-07 | End: 2018-12-07 | Stop reason: SDUPTHER

## 2018-12-07 RX ORDER — FLECAINIDE ACETATE 100 MG/1
100 TABLET ORAL EVERY 12 HOURS SCHEDULED
Qty: 60 TABLET | Refills: 0 | Status: SHIPPED | OUTPATIENT
Start: 2018-12-07 | End: 2019-01-08 | Stop reason: SDUPTHER

## 2018-12-07 RX ORDER — FLUTICASONE FUROATE AND VILANTEROL 100; 25 UG/1; UG/1
1 POWDER RESPIRATORY (INHALATION) DAILY
Qty: 1 INHALER | Refills: 0 | Status: SHIPPED | OUTPATIENT
Start: 2018-12-08 | End: 2018-12-07 | Stop reason: SDUPTHER

## 2018-12-07 RX ORDER — POTASSIUM CHLORIDE 20 MEQ/1
20 TABLET, EXTENDED RELEASE ORAL 2 TIMES DAILY
Qty: 60 TABLET | Refills: 0 | Status: SHIPPED | OUTPATIENT
Start: 2018-12-07 | End: 2019-01-08 | Stop reason: SDUPTHER

## 2018-12-07 RX ADMIN — INSULIN LISPRO 6 UNITS: 100 INJECTION, SOLUTION INTRAVENOUS; SUBCUTANEOUS at 12:28

## 2018-12-07 RX ADMIN — FLUTICASONE FUROATE AND VILANTEROL TRIFENATATE 1 PUFF: 100; 25 POWDER RESPIRATORY (INHALATION) at 09:41

## 2018-12-07 RX ADMIN — INSULIN LISPRO 3 UNITS: 100 INJECTION, SOLUTION INTRAVENOUS; SUBCUTANEOUS at 12:28

## 2018-12-07 RX ADMIN — METOPROLOL SUCCINATE 50 MG: 50 TABLET, EXTENDED RELEASE ORAL at 09:31

## 2018-12-07 RX ADMIN — FLECAINIDE ACETATE 100 MG: 100 TABLET ORAL at 09:34

## 2018-12-07 RX ADMIN — FUROSEMIDE 40 MG: 40 TABLET ORAL at 09:32

## 2018-12-07 RX ADMIN — OXYCODONE HYDROCHLORIDE AND ACETAMINOPHEN 1000 MG: 500 TABLET ORAL at 09:31

## 2018-12-07 RX ADMIN — POTASSIUM CHLORIDE 20 MEQ: 1500 TABLET, EXTENDED RELEASE ORAL at 09:32

## 2018-12-07 RX ADMIN — INSULIN LISPRO 3 UNITS: 100 INJECTION, SOLUTION INTRAVENOUS; SUBCUTANEOUS at 09:34

## 2018-12-07 RX ADMIN — ACETAMINOPHEN 650 MG: 325 TABLET, FILM COATED ORAL at 02:46

## 2018-12-07 RX ADMIN — VITAMIN D, TAB 1000IU (100/BT) 1000 UNITS: 25 TAB at 09:32

## 2018-12-07 RX ADMIN — PANTOPRAZOLE SODIUM 40 MG: 40 TABLET, DELAYED RELEASE ORAL at 05:35

## 2018-12-07 RX ADMIN — TRAMADOL HYDROCHLORIDE 100 MG: 50 TABLET, COATED ORAL at 09:32

## 2018-12-07 RX ADMIN — APIXABAN 5 MG: 5 TABLET, FILM COATED ORAL at 09:32

## 2018-12-07 RX ADMIN — PREDNISONE 20 MG: 20 TABLET ORAL at 09:32

## 2018-12-07 NOTE — ASSESSMENT & PLAN NOTE
She was in acute diastolic CHF admission due to rapid atrial flutter  This was treated with IV Lasix  She is discharged on p  O  Lasix 40 mg p o  B i d  And her potassium was also increased to 20 mg p o  B i d      Patient's electrolytes and renal function will have to be followed as outpatient

## 2018-12-07 NOTE — ASSESSMENT & PLAN NOTE
Lab Results   Component Value Date    HGBA1C 6 8 (H) 09/22/2017       Recent Labs      12/06/18   1702  12/06/18   2036  12/07/18   0617  12/07/18   1104   POCGLU  262*  130  147*  277*       Blood Sugar Average: Last 72 hrs:  (P) 211 [id number]     Her blood sugars were uncontrolled earlier due to IV steroids    She is discharged back home on her metformin

## 2018-12-07 NOTE — TELEPHONE ENCOUNTER
Pt is scheduled to see you on wed 12/12/18  She needs 3 meds refilled to [facility] before than  Also she wants to try and get Breo through [address] [place] [address] patient assist program   Please print Breo rx  Patient assist form on your desk  Pt will  at her appt on wed

## 2018-12-07 NOTE — ASSESSMENT & PLAN NOTE
Patient was admitted with asthma exacerbation  She was placed on nebulizers, Breo, IV steroids  On discharge she has no expiratory wheezing  She will take her Breo and Proventil inhalers at home and she will take prednisone 2 mg p o   Daily for 3 more days

## 2018-12-07 NOTE — ASSESSMENT & PLAN NOTE
Patient admitted with atrial flutter with rapid ventricular rate  Her rate was controlled with beta-blockers  She was seen in consultation by Cardiology who perform a MITCH guided cardioversion  She remained in sinus rhythm after the cardioversion  She will take flecainide and metoprolol as an outpatient and Eliquis for CVA prophylaxis  She will follow up with Cardiology as an outpatient    On day of discharge lungs are clear to auscultation, heart is irregular rate and rhythm

## 2018-12-07 NOTE — DISCHARGE SUMMARY
Discharge- Tor Rather 1960, 59 y o  female MRN: [id number]    Unit/Bed#: [address] [place] [address] Encounter: [id number]    Primary Care Provider: Etta Virgen MD   Date and time admitted to hospital: 11/30/2018  2:10 PM        * Acute respiratory failure with hypoxia [facility])   Assessment & Plan    Patient admitted with Acute hypoxic respiratory failure that was due to acute diastolic CHF the from atrial flutter with rapid ventricular rate and acute COPD exacerbation  She was placed on oxygen  On discharge she is on room air     Atrial flutter [facility])   Assessment & Plan    Patient admitted with atrial flutter with rapid ventricular rate  Her rate was controlled with beta-blockers  She was seen in consultation by Cardiology who perform a MITCH guided cardioversion  She remained in sinus rhythm after the cardioversion  She will take flecainide and metoprolol as an outpatient and Eliquis for CVA prophylaxis  She will follow up with Cardiology as an outpatient    On day of discharge lungs are clear to auscultation, heart is irregular rate and rhythm  Acute diastolic congestive heart failure ([address] )   Assessment & Plan    She was in acute diastolic CHF admission due to rapid atrial flutter  This was treated with IV Lasix  She is discharged on p  O  Lasix 40 mg p o  B i d  And her potassium was also increased to 20 mg p o  B i d  Patient's electrolytes and renal function will have to be followed as outpatient      Severe persistent asthma with acute exacerbation   Assessment & Plan    Patient was admitted with asthma exacerbation  She was placed on nebulizers, Breo, IV steroids  On discharge she has no expiratory wheezing  She will take her Breo and Proventil inhalers at home and she will take prednisone 2 mg p o   Daily for 3 more days     Type 2 diabetes mellitus, without long-term current use of insulin [facility])   Assessment & Plan    Lab Results   Component Value Date    HGBA1C 6 8 (H) 09/22/2017 Recent Labs      12/06/18   1702  12/06/18   2036  12/07/18   0617  12/07/18   1104   POCGLU  262*  130  147*  277*       Blood Sugar Average: Last 72 hrs:  (P) 211 [id number]     Her blood sugars were uncontrolled earlier due to IV steroids  She is discharged back home on her metformin               Hospital Course:     Silvia Garcia is a 59 y o  female patient who originally presented to the hospital on   Admission Orders     Ordered        11/30/18 1718  Inpatient Admission (expected length of stay for this patient is greater than two midnights)  Once            due to shortness of breath    Please see above list of diagnoses and related plan for additional information  Condition at Discharge:  good      Discharge instructions/Information to patient and family:   See after visit summary for information provided to patient and family  Provisions for Follow-Up Care:  See after visit summary for information related to follow-up care and any pertinent home health orders  Disposition:     Home       Discharge Statement:  I spent 33 minutes discharging the patient  This time was spent on the day of discharge  I had direct contact with the patient on the day of discharge  Greater than 50% of the total time was spent examining patient, answering all patient questions, arranging and discussing plan of care with patient as well as directly providing post-discharge instructions  Additional time then spent on discharge activities  Discharge Medications:  See after visit summary for reconciled discharge medications provided to patient and family        ** Please Note: This note has been constructed using a voice recognition system **

## 2018-12-10 RX ORDER — LORAZEPAM 0.5 MG/1
TABLET ORAL
Qty: 90 TABLET | Refills: 1 | Status: SHIPPED | OUTPATIENT
Start: 2018-12-10 | End: 2019-02-05 | Stop reason: SDUPTHER

## 2018-12-10 RX ORDER — TRAMADOL HYDROCHLORIDE 50 MG/1
TABLET ORAL
Qty: 120 TABLET | Refills: 0 | Status: SHIPPED | OUTPATIENT
Start: 2018-12-10 | End: 2019-01-08 | Stop reason: SDUPTHER

## 2018-12-10 RX ORDER — ROPINIROLE 2 MG/1
2 TABLET, FILM COATED ORAL
Qty: 90 TABLET | Refills: 1 | Status: SHIPPED | OUTPATIENT
Start: 2018-12-10 | End: 2019-05-10

## 2018-12-10 RX ORDER — FLUTICASONE FUROATE AND VILANTEROL 100; 25 UG/1; UG/1
1 POWDER RESPIRATORY (INHALATION) DAILY
Qty: 3 INHALER | Refills: 3 | Status: SHIPPED | OUTPATIENT
Start: 2018-12-10 | End: 2019-11-20 | Stop reason: SDUPTHER

## 2018-12-12 ENCOUNTER — OFFICE VISIT (OUTPATIENT)
Dept: FAMILY MEDICINE CLINIC | Facility: HOSPITAL | Age: 58
End: 2018-12-12
Payer: COMMERCIAL

## 2018-12-12 VITALS
DIASTOLIC BLOOD PRESSURE: 80 MMHG | TEMPERATURE: 96.9 F | OXYGEN SATURATION: 94 % | HEART RATE: 80 BPM | HEIGHT: 63 IN | WEIGHT: 247.6 LBS | BODY MASS INDEX: 43.87 KG/M2 | SYSTOLIC BLOOD PRESSURE: 140 MMHG

## 2018-12-12 DIAGNOSIS — I48.91 ATRIAL FIBRILLATION, UNSPECIFIED TYPE (HCC): Primary | ICD-10-CM

## 2018-12-12 DIAGNOSIS — J45.40 MODERATE PERSISTENT ASTHMA WITHOUT COMPLICATION: ICD-10-CM

## 2018-12-12 DIAGNOSIS — G47.33 OBSTRUCTIVE SLEEP APNEA: ICD-10-CM

## 2018-12-12 DIAGNOSIS — E11.65 TYPE 2 DIABETES MELLITUS WITH HYPERGLYCEMIA, WITHOUT LONG-TERM CURRENT USE OF INSULIN (HCC): ICD-10-CM

## 2018-12-12 DIAGNOSIS — E11.9 TYPE 2 DIABETES MELLITUS WITHOUT COMPLICATION, WITHOUT LONG-TERM CURRENT USE OF INSULIN (HCC): ICD-10-CM

## 2018-12-12 DIAGNOSIS — I10 BENIGN ESSENTIAL HYPERTENSION: ICD-10-CM

## 2018-12-12 PROBLEM — R20.2 PARESTHESIA OF UPPER EXTREMITY: Status: ACTIVE | Noted: 2017-09-20

## 2018-12-12 PROCEDURE — 99214 OFFICE O/P EST MOD 30 MIN: CPT | Performed by: FAMILY MEDICINE

## 2018-12-12 NOTE — PROGRESS NOTES
ASSESSMENT/PLAN:    Problem List Items Addressed This Visit        Endocrine    Diabetes mellitus with hyperglycemia ([facility] )    Type 2 diabetes mellitus, without long-term current use of insulin ([facility] ) - Primary       Respiratory    Obstructive sleep apnea       Cardiovascular and Mediastinum    Atrial fibrillation ([facility] )    Benign essential hypertension        Here for hospital f/u  Afib  Stable  S/p cardioversion  Currently on eliquis and toprol  Will be following up with cardiology and will discuss need for ongoing anticoagulation  Asthma  Stable  Improved  Back on Breo  Forms completed for patient assistance  Diabetes  Stable  Continue working on dietary control  Adam  Stable  Continues on cpap  No Follow-up on file  To call our office if any concerns/questions at [phone]   ______________________________________________________________________          Patient is here for follow up of chronic conditions  Chief Complaint   Patient presents with    Transition of Care Management     D/C [facility] 12/07/2018       History of Present Illness:     Patient here for f/u  Went through a complicated hospital course  Was found to be hypoxemic and tachcardic  Found new onset atrial fibrillation with rapid vent response in addition to asthma exacerbation  This was controlled  Was cardioverted in hospital  She remained in sinus  Was discharged on eliquis which may be dc'd at her cardio follow up  Her asthma improved with treatment to include steroids  Was found to have abnormality of the Ct of her abdomen  Underwent cscope and EGD but this was unremarkable  Review of Systems   Constitutional: Negative  Negative for activity change, appetite change, chills, diaphoresis, fatigue and fever  HENT: Negative for congestion, facial swelling and sore throat  Respiratory: Negative  Negative for apnea, cough, chest tightness and shortness of breath  Cardiovascular: Negative  Negative for chest pain and palpitations  Gastrointestinal: Negative  Negative for abdominal distention, abdominal pain, blood in stool, constipation, diarrhea and nausea  Genitourinary: Negative  Negative for difficulty urinating, dysuria, flank pain and frequency  Social History     Social History    Marital status: Significant Other     Spouse name: N/A    Number of children: N/A    Years of education: N/A     Occupational History    Not on file  Social History Main Topics    Smoking status: Current Every Day Smoker     Packs/day: 0 25     Years: 9 00     Types: Cigarettes    Smokeless tobacco: Never Used      Comment: hasn't smoked for a few days d/t being sick    Alcohol use Yes      Comment: about 6 [employer] light every night    Drug use: No    Sexual activity: Not on file     Other Topics Concern    Not on file     Social History Narrative    No narrative on file               Allergies   Allergen Reactions    Iron Dextran Swelling    Januvia [Sitagliptin] Shortness Of Breath    Jardiance [Empagliflozin] Shortness Of Breath    Clonazepam Other (See Comments)     Unknown reaction    Codeine Itching and Other (See Comments)     itch;mary kay morphine,takes ultram @home    Adhesive [Medical Tape]      itching    Lactose     Oxycodone-Acetaminophen      Other reaction(s): Other (See Comments)  (PERCOCET) MILD RASH    Oxycodone-Acetaminophen Anxiety       Immunization History   Administered Date(s) Administered    Hep B, adult 09/09/2004, 10/07/2004, 03/22/2005    Influenza 12/06/2018    Influenza Quadrivalent, 6-35 Months IM 09/14/2015, 09/20/2017    Influenza TIV (IM) 10/15/2014    Influenza, recombinant, quadrivalent,injectable, preservative free 12/06/2018    Pneumococcal Conjugate 13-Valent 12/06/2018       Vitals:    12/12/18 1439   BP: 140/80   Pulse: 80   Temp: (!) 96 9 °F (36 1 °C)   SpO2: 94%     Body mass index is 43 86 kg/m²    Physical Exam   Constitutional: She appears well-developed and well-nourished  HENT:   Head: Normocephalic and atraumatic  Eyes: Pupils are equal, round, and reactive to light  Conjunctivae and EOM are normal    Neck: Normal range of motion  Neck supple  Cardiovascular: Normal rate, regular rhythm and normal heart sounds  Pulses are no weak pulses  Pulses:       Dorsalis pedis pulses are 2+ on the right side, and 2+ on the left side  Posterior tibial pulses are 2+ on the right side, and 2+ on the left side  Pulmonary/Chest: Effort normal and breath sounds normal    Abdominal: Soft  Bowel sounds are normal    Feet:   Right Foot:   Skin Integrity: Negative for ulcer, skin breakdown, erythema, warmth, callus or dry skin  Left Foot:   Skin Integrity: Negative for ulcer, skin breakdown, erythema, warmth, callus or dry skin  Skin: Skin is warm and dry  Psychiatric: She has a normal mood and affect  Nursing note and vitals reviewed  Patient's shoes and socks removed  Right Foot/Ankle   Right Foot Inspection  Skin Exam: skin normal and skin intact no dry skin, no warmth, no callus, no erythema, no maceration, no abnormal color, no pre-ulcer, no ulcer and no callus                          Toe Exam: ROM and strength within normal limits  Sensory   Vibration: intact  Proprioception: intact   Monofilament testing: intact  Vascular  Capillary refills: < 3 seconds  The right DP pulse is 2+  The right PT pulse is 2+  Left Foot/Ankle  Left Foot Inspection  Skin Exam: skin normal and skin intactno dry skin, no warmth, no erythema, no maceration, normal color, no pre-ulcer, no ulcer and no callus                         Toe Exam: ROM and strength within normal limits                   Sensory   Vibration: intact  Proprioception: intact  Monofilament: intact  Vascular  Capillary refills: < 3 seconds  The left DP pulse is 2+  The left PT pulse is 2+  Assign Risk Category:  Deformity present; No loss of protective sensation;  No weak pulses       Risk: 0                  Health Maintenance Due   Topic Date Due    Hepatitis C Screening  1960    Depression Screening PHQ  1960    Pneumococcal PPSV23 Medium Risk Adult (1 of 1 - PPSV23) 12/10/1977    DTaP,Tdap,and Td Vaccines (1 - Tdap) 12/10/1979    PAP SMEAR  12/10/1979    HEMOGLOBIN A1C  03/22/2018    MAMMOGRAM  03/24/2018    Diabetic Foot Exam  03/29/2018    URINE MICROALBUMIN  09/22/2018

## 2019-01-08 DIAGNOSIS — Z79.899 ENCOUNTER FOR LONG-TERM (CURRENT) DRUG USE: ICD-10-CM

## 2019-01-08 DIAGNOSIS — I48.91 ATRIAL FIBRILLATION, UNSPECIFIED TYPE (HCC): ICD-10-CM

## 2019-01-08 DIAGNOSIS — I50.31 ACUTE DIASTOLIC CONGESTIVE HEART FAILURE (HCC): ICD-10-CM

## 2019-01-08 NOTE — TELEPHONE ENCOUNTER
klor-con 20meg - one am, one pm  Flecainide 100mg - one am, one pm    States cardiologist will not fill until she has been seen there  These are meds from when admitted  She has enough for Tues and Wednesday

## 2019-01-09 RX ORDER — FLECAINIDE ACETATE 100 MG/1
100 TABLET ORAL EVERY 12 HOURS SCHEDULED
Qty: 60 TABLET | Refills: 0 | Status: SHIPPED | OUTPATIENT
Start: 2019-01-09 | End: 2019-02-05 | Stop reason: SDUPTHER

## 2019-01-09 RX ORDER — TRAMADOL HYDROCHLORIDE 50 MG/1
TABLET ORAL
Qty: 120 TABLET | Refills: 0 | Status: SHIPPED | OUTPATIENT
Start: 2019-01-09 | End: 2019-02-05 | Stop reason: SDUPTHER

## 2019-01-09 RX ORDER — POTASSIUM CHLORIDE 20 MEQ/1
20 TABLET, EXTENDED RELEASE ORAL 2 TIMES DAILY
Qty: 60 TABLET | Refills: 0 | Status: SHIPPED | OUTPATIENT
Start: 2019-01-09 | End: 2019-02-05 | Stop reason: SDUPTHER

## 2019-01-18 ENCOUNTER — TELEPHONE (OUTPATIENT)
Dept: FAMILY MEDICINE CLINIC | Facility: HOSPITAL | Age: 59
End: 2019-01-18

## 2019-01-18 DIAGNOSIS — B37.3 MONILIAL VAGINITIS: Primary | ICD-10-CM

## 2019-01-18 RX ORDER — FLUCONAZOLE 150 MG/1
TABLET ORAL
Qty: 2 TABLET | Refills: 0 | Status: SHIPPED | OUTPATIENT
Start: 2019-01-18 | End: 2019-01-21

## 2019-01-18 NOTE — TELEPHONE ENCOUNTER
Was recently discharged from [facility] on prednisone  She was put on insulin to help control her blood sugars  She has a yeast infection now  She has had them in the past and has gotten a prescription previously  Will be on the prednisone for 1 1/2 week and would like a refill for the prescription if possible  Please call back to let her know if we can call something in to [facility]

## 2019-01-24 ENCOUNTER — OFFICE VISIT (OUTPATIENT)
Dept: FAMILY MEDICINE CLINIC | Facility: HOSPITAL | Age: 59
End: 2019-01-24
Payer: COMMERCIAL

## 2019-01-24 VITALS
DIASTOLIC BLOOD PRESSURE: 80 MMHG | BODY MASS INDEX: 43.87 KG/M2 | TEMPERATURE: 96.9 F | HEART RATE: 100 BPM | SYSTOLIC BLOOD PRESSURE: 132 MMHG | WEIGHT: 247.6 LBS | OXYGEN SATURATION: 95 % | HEIGHT: 63 IN

## 2019-01-24 DIAGNOSIS — J02.8 SORE THROAT (VIRAL): ICD-10-CM

## 2019-01-24 DIAGNOSIS — I48.91 ATRIAL FIBRILLATION, UNSPECIFIED TYPE (HCC): ICD-10-CM

## 2019-01-24 DIAGNOSIS — J45.40 MODERATE PERSISTENT ASTHMA WITHOUT COMPLICATION: Primary | ICD-10-CM

## 2019-01-24 DIAGNOSIS — E11.65 TYPE 2 DIABETES MELLITUS WITH HYPERGLYCEMIA, WITHOUT LONG-TERM CURRENT USE OF INSULIN (HCC): ICD-10-CM

## 2019-01-24 DIAGNOSIS — B97.89 SORE THROAT (VIRAL): ICD-10-CM

## 2019-01-24 PROCEDURE — 99214 OFFICE O/P EST MOD 30 MIN: CPT | Performed by: FAMILY MEDICINE

## 2019-01-24 RX ORDER — PREDNISONE 20 MG/1
TABLET ORAL DAILY
COMMUNITY
End: 2019-03-07

## 2019-01-24 RX ORDER — INSULIN GLARGINE 100 [IU]/ML
INJECTION, SOLUTION SUBCUTANEOUS DAILY
COMMUNITY
End: 2019-03-07

## 2019-01-24 RX ORDER — GLIMEPIRIDE 4 MG/1
4 TABLET ORAL
Qty: 30 TABLET | Refills: 2 | Status: SHIPPED | OUTPATIENT
Start: 2019-01-24 | End: 2019-03-27 | Stop reason: SDUPTHER

## 2019-01-24 NOTE — PROGRESS NOTES
Assessment/Plan:        Problem List Items Addressed This Visit        Endocrine    Diabetes mellitus with hyperglycemia (HCC)    Relevant Medications    insulin glargine (LANTUS) 100 units/mL subcutaneous injection    glimepiride (AMARYL) 4 mg tablet       Respiratory    Moderate persistent asthma without complication - Primary       Cardiovascular and Mediastinum    Atrial fibrillation [facility])        Patient is doing relatively well post hospital    Asthma is improved  Completing prednisone course  Continues on [facility]  Will continue to monitor  Diabetes  With hyperglycemia  Using ISS as instructed post hospital  No long acting insulin  Remains on metformin  Will add glimeperide 4 mg daily for now  Continue with 4 times a day BS check with insulin coverage  Continue efforts with diet and exercise  Afib  Stable  Continue with the same regimen  Continue f/u with Cardiology  Has f/u visit with Dr Melyssa Simeon throat  [facility] viral  Monitor and ocntinue with supportive treatment  To call our office if any concerns/questions at [phone]       ______________________________________________________________      Chief Complaint   Patient presents with    Transition of Care Management     D/C [facility] 01/17/2019       History of Present Illness:  Here for follow up of  Hospital stay  Patient was having sob and called cardiology  Was recommended to be seen in the ER  Afib and cardiac status was stable  However noted worsening of asthma, possibly pneumonia, and could not rule out chf  She was initially treated with antibioitics but then discontinued  She improved with neb treatment and steroids  She was discharged in stable conditions  Her diabetes showed high BS numbers  Noting worsening due to steroids  Her A1c however was noted to be 9 2  Her Bs over the past few days are in the high 200-300 range   She is using novolog insulin as coverage based on a sliding scale as she was instructed upon discharge  Afib  Doing well  On AC and antiarryhtmic  She will be following up with Cardiology  No chest pain  No dypnea on exertion, no pnd, no orthopnea  Has been having a constant sore throat  Able to drink and eat  No fever, no chills  Review of Systems   Constitutional: Negative  Negative for activity change, appetite change, chills, diaphoresis, fatigue and fever  HENT: Negative for congestion, facial swelling and sore throat  Respiratory: Negative  Negative for apnea, cough, chest tightness and shortness of breath  Cardiovascular: Negative  Negative for chest pain and palpitations  Gastrointestinal: Negative  Negative for abdominal distention, abdominal pain, blood in stool, constipation, diarrhea and nausea  Genitourinary: Negative  Negative for difficulty urinating, dysuria, flank pain and frequency  Social History     Social History    Marital status: Significant Other     Spouse name: N/A    Number of children: N/A    Years of education: N/A     Occupational History    Not on file  Social History Main Topics    Smoking status: Current Every Day Smoker     Packs/day: 0 25     Years: 9 00     Types: Cigarettes    Smokeless tobacco: Never Used      Comment: hasn't smoked for a few days d/t being sick    Alcohol use Yes      Comment: about 6 [employer] light every night    Drug use: No    Sexual activity: Not on file     Other Topics Concern    Not on file     Social History Narrative    No narrative on file               Allergies   Allergen Reactions    Iron Dextran Swelling    Januvia [Sitagliptin] Shortness Of Breath    Jardiance [Empagliflozin] Shortness Of Breath    Clonazepam Other (See Comments)     Unknown reaction    Codeine Itching and Other (See Comments)     itch;mary kay morphine,takes ultram @home    Adhesive [Medical Tape]      itching    Lactose     Oxycodone-Acetaminophen      Other reaction(s):  Other (See Comments)  (PERCOCET) MILD RASH  Oxycodone-Acetaminophen Anxiety           Vitals:    01/24/19 1243   BP: 132/80   Pulse: 100   Temp: (!) 96 9 °F (36 1 °C)   SpO2: 95%     Body mass index is 43 86 kg/m²  Physical Exam   Constitutional: She appears well-developed and well-nourished  HENT:   Head: Normocephalic and atraumatic  Right Ear: External ear normal    Left Ear: External ear normal    Nose: Nose normal    Mouth/Throat: Oropharynx is clear and moist  No oropharyngeal exudate  Eyes: Pupils are equal, round, and reactive to light  Conjunctivae and EOM are normal    Neck: Normal range of motion  Neck supple  Cardiovascular: Normal rate, regular rhythm and normal heart sounds  Exam reveals no gallop and no friction rub  No murmur heard  Pulmonary/Chest: Effort normal and breath sounds normal  No respiratory distress  She has no wheezes  She has no rales  She exhibits no tenderness  Abdominal: Soft  Bowel sounds are normal    Skin: Skin is warm and dry  Psychiatric: She has a normal mood and affect  Nursing note and vitals reviewed  No Follow-up on file

## 2019-02-05 DIAGNOSIS — I48.91 ATRIAL FIBRILLATION, UNSPECIFIED TYPE (HCC): ICD-10-CM

## 2019-02-05 DIAGNOSIS — I50.31 ACUTE DIASTOLIC CONGESTIVE HEART FAILURE (HCC): ICD-10-CM

## 2019-02-05 DIAGNOSIS — Z79.899 ENCOUNTER FOR LONG-TERM (CURRENT) DRUG USE: ICD-10-CM

## 2019-02-06 RX ORDER — POTASSIUM CHLORIDE 20 MEQ/1
20 TABLET, EXTENDED RELEASE ORAL 2 TIMES DAILY
Qty: 60 TABLET | Refills: 0 | Status: SHIPPED | OUTPATIENT
Start: 2019-02-06 | End: 2019-03-05 | Stop reason: SDUPTHER

## 2019-02-06 RX ORDER — FLECAINIDE ACETATE 100 MG/1
100 TABLET ORAL EVERY 12 HOURS SCHEDULED
Qty: 60 TABLET | Refills: 0 | Status: SHIPPED | OUTPATIENT
Start: 2019-02-06 | End: 2019-03-05 | Stop reason: SDUPTHER

## 2019-02-06 RX ORDER — FUROSEMIDE 40 MG/1
40 TABLET ORAL 2 TIMES DAILY
Qty: 60 TABLET | Refills: 0 | Status: SHIPPED | OUTPATIENT
Start: 2019-02-06 | End: 2019-03-05 | Stop reason: SDUPTHER

## 2019-02-06 RX ORDER — LORAZEPAM 0.5 MG/1
TABLET ORAL
Qty: 90 TABLET | Refills: 1 | Status: SHIPPED | OUTPATIENT
Start: 2019-02-06 | End: 2019-03-27 | Stop reason: SDUPTHER

## 2019-02-06 RX ORDER — TRAMADOL HYDROCHLORIDE 50 MG/1
TABLET ORAL
Qty: 120 TABLET | Refills: 0 | Status: SHIPPED | OUTPATIENT
Start: 2019-02-06 | End: 2019-03-05 | Stop reason: SDUPTHER

## 2019-02-08 NOTE — UTILIZATION REVIEW
URGENT/EMERGENT  INPATIENT/SPU AUTHORIZATION REQUEST    Date: 02/08/19            # Pages in this Request:     x New Request   Additional Information for PA#:     Office Contact Name:  Michael Colindres Title: Utilization Review, [facility] Nurse     Phone: [phone]  Ext  Availability (Date/Time): Wednesday - Friday 8 am- 4 pm    x Inpatient Review  SPU Review        Current       x Late Pick-up   · How your facility was first notified of the Late Pick-up: PATHS  · When your facility was first notified of the Late Pick-up (date): 2/7         RECIPIENT INFORMATION    Recipient RK#:[id number]  Recipient Name: Robyn Miller    YOB: 1960  59 y o  Recipient Alias:   Gender:   Male X Female Medicaid Eligibility ([address] [place] [address]): INSURANCE INFORMATION    (All other private or governmental health insurance benefits must be utilized prior to billing the [place] Program)    Was this admission the result of an MVA, other accident, assault, injury, fall, gunshot, bite etc ? Yes X No                   If yes, provide a brief description of the incident  Does the recipient have other insurance coverage? Yes X No        Insurance Company Name/Policy #      Did that insurance pay on this claim? Yes  No        Did that insurance deny this claim? Yes  No    If yes, reason for denial:      Does the recipient have Medicare? Yes X No        Did Medicare exhaust prior to this admission? Yes  No            Did Medicare partially pay this claim? Yes  No        Did that insurance deny this claim? Yes  No    If yes, reason for denial:          Was the recipient a prisoner at the time of admission?    Yes X No            PROVIDER INFORMATION    [facility]   PROMISe Provider ID#: [id number]    [address] [place] [address] Physician Name: Dustin Urbina Provider ID#: [id number]        ADMISSION INFORMATION    Type of Admission: (please choose one)    X ED      Direct    If yes, from where?     Transfer    If yes, transferring hospital (inpatient, rehab, or psych) Provider Name/Provider ID#: Admission Floor or Unit Type: TELEMETRY    Dates/Times:        ED Date/Time: 11/30/2018  @1359        Observation Date/Time:        Admission Date/Time: 11/30/18 1718        Discharge or Transfer Date/Time: 12/7/2018  @1148        DIAGNOSIS/PROCEDURE CODES    Primary Diagnosis Code/Primary Diagnosis Code description: J96 01 ACUTE RESP FAILURE WITH HYPOXIA; Diabetes mellitus ([facility] ) [E11 9]; Chronic obstructive asthma ([facility] ) [J44 9]; Atrial fibrillation, unspecified type ([place] [address] ) [I48 91]   (MAY RE-ORDER DX CODES)    Additional Diagnosis Code(s) and Description(s)-(up to three additional codes):     Procedure Code (one) and description: [id number] EXCISION OF ESOPHAGUS, ENDO, DIAG        CLINICAL INFORMATION - PRIOR ADMISSION ONLY    Is there a prior admission with a discharge date within 30 days of the date of this admission? X No (Proceed to the next section - "Clinical Information - General Review Checklist:)      Yes (Provide the following information)     Prior admission dates:    MA Prior Authorization Number:        Review Outcome:     Diagnosis Code(s)/Description:    Procedure Code/Description:    Findings:    Treatment:    Condition on Discharge:   Vitals:    Labs:   Imaging:   Medications: Follow-up Instructions:    Disposition:        CLINICAL INFORMATION - GENERAL REVIEW CHECKLIST    EMERGENCY DEPARTMENT: (Proceed to "ADMISSION" if Direct Admission)    Presenting Signs/Symptoms:    Asthma       Patient presents to the ER stating she was sent from dr Renetta Chen office for recurrent asthma symptoms after a course of prednisone and antibiotics   sent for cxr          History of Illness: Nicky Riley a 59 y o  female with history of tobacco use, CHF, COPD, and diabetes who presents with complaints of shortness of breath and cough for the past several weeks   Patient was seen by PCP on 11/13/2018 for acute bronchitis with asthma exacerbation   She was treated with Z-Carlos and started on prednisone taper   Patient finished prednisone taper yesterday   She went to PCP today for follow-up and was sent to ED for evaluation   On EKG patient was noted to be tachycardic with heart rate in the 130s   She was given diltiazem 40 mg IV x1   SpO2 88% on room air   Patient was placed on O2 at 2 L  CTA of chest abdomen pelvis shows pulmonary hypertension   Prominent was a ache attenuation of lung fields with differential diagnosis including respiratory bronchiolitis, hypersensitivity pneumonitis, and chronic thromboembolic disease   Soft tissue thickening at hepatic flexure which may be due to peristalsis   Possibility of underlying colonic neoplasm   Colonoscopy recommended if not recently performed   Enlarged heterogeneously enhancing liver correlation with liver function testing recommended   If liver function abnormal consider nonemergent ultrasound   Patient admits to being under a lot of stress lately as father  2 weeks ago and patient is now caring for mother who has dementia  Sonia Viveros is uninsured and therefore does not have inhalers which were prescribed to her   Patient admits to smoking 2-6 cigarettes daily but has not smoked in several days because she has no cigarettes   On exam patient is noted to have wheezing throughout with fine rales in bilateral bases   Breathing easy on O2 at 2 L via nasal cannula   Moderate nonpitting edema noted to bilateral lower extremities   Patient states that she gained approximately 30-40 lb in 1 years time   Patient denies fevers or chills   She has frequent productive cough for thick white sputum   Patient feels intermittent chest pain and palpitations   None currently  Medication/treatment prior to arrival in the ED:     Past Medical History:    Active Ambulatory Problems     Diagnosis Date Noted    Trigger thumb, left thumb 2017    Trigger middle finger of left hand 12/14/2017    Trigger ring finger of left hand 12/14/2017    Ganglion cyst of flexor tendon sheath 12/14/2017    Benign essential hypertension 03/24/2014    Diabetes mellitus with hyperglycemia (HCC) 06/23/2017    Esophageal reflux 06/23/2014    Obstructive sleep apnea 03/09/2014    Depression with anxiety 03/09/2014    Cervical radiculopathy 09/13/2017    Chronic low back pain 10/15/2014    Hypercholesterolemia 09/09/2014    Tobacco use 02/20/2018    Elevated liver enzymes 12/30/2015    Lumbar radiculopathy 09/09/2014    Paresthesia of upper extremity 09/20/2017    Restless legs syndrome 04/08/2014    Sexual dysfunction 11/11/2014    Atrial fibrillation ([facility] ) 12/12/2018    Moderate persistent asthma without complication 07/77/5250     Resolved Ambulatory Problems     Diagnosis Date Noted    Severe persistent asthma with acute exacerbation 02/20/2018    Post-viral cough syndrome 02/20/2018     Past Medical History:   Diagnosis Date    Anemia     Cervical radiculopathy     CHF (congestive heart failure) (HCC)     Chronic low back pain     Chronic obstructive asthma ([facility] ) 2/20/2018    Community acquired pneumonia     Diabetes mellitus ([facility] )     Diabetes mellitus with hyperglycemia (HCC)     Elevated liver enzymes     GERD (gastroesophageal reflux disease)     Paresthesia of upper extremity     Plantar fasciitis     Restless leg     Sexual dysfunction     Sleep apnea, obstructive     Tenosynovitis of finger     Tinea corporis     Weakness of upper extremity        Clinical Exam:     Initial Vital Signs: (Temp, Pulse, Resp, and BP)   ED Triage Vitals   Temperature Pulse Respirations Blood Pressure SpO2   11/30/18 1417 11/30/18 1412 11/30/18 1412 11/30/18 1412 11/30/18 1412   97 5 °F (36 4 °C) (!) 138 22 148/91 93 %      Temp Source Heart Rate Source Patient Position - Orthostatic VS BP Location FiO2 (%)   11/30/18 1417 11/30/18 1417 11/30/18 1412 11/30/18 1412 -- Tympanic Monitor Lying Right arm       Pain Score       11/30/18 1412       No Pain           Pertinent Repeat Vital Signs: (include times they were obtained)    Pertinent Sustained Findings: (include times they were obtained)    Weight in Kilograms:  12/01/18 120 kg (263 lb 7 2 oz)          Pertinent Labs (results):   BNP    692  Chloride   94  CO2    36  Alk phos   138  Albumin   3 4  H/H  10 8/36 5    Radiology (results):  Ct  Abd/pelvis:    No acute pulmonary embolism   Severe pulmonary artery enlargement consistent with pulmonary hypertension  2   Prominent mosaic attenuation of the lung fields, increased from the prior CT   Differential includes respiratory bronchiolitis, hypersensitivity pneumonitis, and chronic thromboembolic disease, particularly given the enlarged pulmonary artery  3   No acute inflammatory process in the abdomen or pelvis  4   Soft tissue thickening at the hepatic flexure which may be due to peristalsis   This appears more prominent than expected, however, and the possibility of underlying colonic neoplasm is not excluded   Further evaluation with colonoscopy is   recommended if this has not been recently performed  5   Enlarged heterogeneously enhancing liver   While this may be a normal variant, correlation with liver function testing recommended   If liver function tests are abnormal, consider follow-up nonemergent ultrasound evaluation  EKG (results):      Other tests (results):    Tests pending final results:    Treatment in the ED:   Medication Administration from 11/30/2018 1359 to 11/30/2018 1806       Date/Time Order Dose Route Action Action by Comments     11/30/2018 1504 ipratropium-albuterol (DUO-NEB) 0 5-2 5 mg/3 mL inhalation solution 3 mL 3 mL Nebulization Given Kirk Dave RN      11/30/2018 1601 iohexol (OMNIPAQUE) 350 MG/ML injection (MULTI-DOSE) 100 mL 100 mL Intravenous Given Estela Goodson      11/30/2018 1730 sodium chloride 0 9 % bolus 250 mL 0 mL Intravenous Stopped Bradly Boateng RN      11/30/2018 1624 sodium chloride 0 9 % bolus 250 mL 250 mL Intravenous Kimefrain 37 Bradly Boateng RN      11/30/2018 1635 diltiazem (CARDIZEM) injection 20 mg 20 mg Intravenous Given Bradly Boateng RN            Meds: Name, dose, route, time, number of doses given        Nebulizer treatments: Type, total number given      IVs: Type, rate, total amt  given          Other treatments:       Change in condition while in the ED:       Response to ED Treatment:           OBSERVATION: (Proceed to "ADMISSION" if Direct Admission)    Orders written during the observation period  Meds Name, dose, route, time, how may doses given:  PRN Meds Name, dose, route, time, how many doses given within the first 24 hrs :  IVs Type, rate, and total amt  ordered/given:  Labs, imaging, other:  Consults and findings:    Test Results during the observation period  Pertinent Lab tests (dates/results):  Culture results (blood, urine, spinal, wound, respiratory, etc ):  Imaging tests (dates/results):  EKG (dates/results):   Other test (dates/results):  Tests pending (dates/results):    Surgical or Invasive Procedures during the observation period  Name of surgery/procedure:  Date & Time:  Patient Response:  Post-operative orders:  Operative Report/Findings:    Response to Treatment, Major Change in Condition, Major Charge in Treatment during the observation period          ADMISSION:    DIRECT Admissions Only:    · Presenting Signs/Symptoms:   ·   · Medication/treatment prior to arrival:  ·   · Past Medical History:  ·   · Clinical Exam on admission:  ·   · Vital Signs on admission: (Temp, Pulse, Resp, and BP)  ·   · Weight in kilograms:     ALL Admissions:    Admission Orders and Other Orders written within the first 24 hrs after admission  Tele  CCD diet  O2  2L  Cons cardiology  Cons  Pulmonary  2 DE  Cons  GI         Meds Name, dose, route, time, how may doses given:  acetaminophen 650 mg Oral Q4H PRN VICKY Castro     albuterol 2 puff Inhalation Q4H PRN VICKY Castro     ascorbic acid 1,000 mg Oral Daily VICKY Cohen     budesonide 0 5 mg Nebulization Q12H Riki Lagos MD     cholecalciferol 1,000 Units Oral Daily VICKY Castro     diltiazem 1-15 mg/hr Intravenous Titrated VICKY Castro Last Rate: 15 mg/hr (12/01/18 0930)   enoxaparin 1 mg/kg Subcutaneous Q12H Evelina Yanes MD     furosemide 40 mg Intravenous BID VICKY Ma     guaiFENesin 200 mg Oral Q4H PRN [place] VICKY Peace     influenza vaccine 0 5 mL Intramuscular Prior to discharge William Jackson MD     insulin lispro 2-12 Units Subcutaneous TID AC Jenn Dean MD     levalbuterol 0 63 mg Nebulization Q6H PRN VICKY Castro     methylPREDNISolone sodium succinate 40 mg Intravenous Q12H [address] Riki Lagos MD     metoprolol 5 mg Intravenous Q6H PRN VICKY Castro     metoprolol succinate 50 mg Oral Daily VICKY Cohen     ondansetron 4 mg Intravenous Q6H PRN VICKY Castro     pantoprazole 40 mg Oral Early Morning VICKY Cohen     pneumococcal 13-valent conjugate vaccine 0 5 mL Intramuscular Prior to discharge William Jackson MD     potassium chloride 10 mEq Oral BID VICKY Castro     [START ON 12/2/2018] predniSONE 40 mg Oral Daily Riki Lagos MD     rOPINIRole 2 mg Oral HS VICKY Cohen     traMADol 100 mg Oral Q12H Jenn Dean MD     traZODone 150 mg Oral HS VICKY Cohen     warfarin 5 mg Oral Daily (warfarin) VICKY Castro            PRN Meds Name, dose, route, time, how many doses given within the first 24 hrs :     acetaminophen    albuterol    guaiFENesin    influenza vaccine    levalbuterol    metoprolol    ondansetron    pneumococcal 13-valent conjugate vaccine        IVs Type, rate, and total amt   ordered/given:  diltiazem 1-15 mg/hr Last Rate: 15 mg/hr (12/01/18 0930)       Labs, imaging, other:      Consults and findings:  Acute respiratory failure with hypoxia (HCC)   Assessment & Plan     SpO2 in the 80s on room air   Most likely due to new onset AFib, CHF, and asthma exacerbation   Placed on O2 at 2 L  SpO2 ranging from 94-96%   Will continue to monitor SpO2 and titrate O2 as needed to keep SpO2 greater than 92%   CTA of chest abdomen and pelvis shows prominent mosaic attenuation of lung fields   Differential diagnosis include respiratory bronchiolitis, hypersensitivity pneumonitis, and chronic thromboembolic disease   Encourage tobacco cessation   Consult pulmonology       Severe persistent asthma with acute exacerbation   Assessment & Plan     Patient was seen by PCP on 11/13/2018 and diagnosed with acute bronchitis with asthma exacerbation   She was treated with a Z-Carlos and prednisone taper   She was instructed to return to PCP if no improvement   On exam patient is noted to have expiratory wheezing throughout   Continues with frequent productive cough for thick white sputum   Will place patient on IV Solu-Medrol 40 mg q 6 hours, duo nebs q 6 hours, guaifenesin q 4 hours p r n     Albuterol p r n  Shortness of breath or wheezing   Will place inpatient consult to pulmonology       Atrial flutter [facility])   Assessment & Plan     Heart rate ranging 110s to 130s   Patient was given 2 diltiazem boluses of 20 mg each with little to no effect   Will add metoprolol 5 mg IV q 6 hours p r n  Heart rate greater than 110  Oswaldo Vasc score of 4  Will start patient on Coumadin 5 mg p o  Renae Johnston PT INR in a m     Appreciate recommendations from Cardiology              Acute congestive heart failure [facility])   Assessment & Plan     Patient takes Lasix 20 mg p o  B i d  at home which was prescribed by Dr Timbo Stone for CHF   No echocardiogram documented   BNP elevated at 692   Rales heard in bilateral lower lobes   Patient has moderate nonpitting edema to bilateral lower extremities  Kianna Zelaya increase Lasix to 40 mg IV b i d     Monitor I/O and daily weights   Place patient on 1800 mL fluid restriction and 2 g sodium diet         Benign essential hypertension   Assessment & Plan     BP controlled   Continue Toprol XL 50 mg p o  Daily   Monitor BP per nursing unit       Type 2 diabetes mellitus, without long-term current use of insulin (HCC)          Iron deficiency anemia   Assessment & Plan     Hemoglobin 10 8   No obvious signs of bleeding   Will continue to monitor and transfuse as needed to keep hemoglobin greater than 8        Obstructive sleep apnea   Assessment & Plan     Patient wear CPAP at night       Tobacco use   Assessment & Plan     Patient admits to smoking 2 to 6 cigarettes per day but has not had any in the past several days   Refusing Nicoderm patch   Tobacco cessation education          Anticipated Length of Stay: Zaid Shankar will be admitted on an Inpatient basis with an anticipated length of stay of  > 2 midnights    Justification for Hospital Stay: IV medications     Per  Pulmonary  Consult:    1  Acute hypoxic respiratory failure with documented pulse ox 88%, most likely multifactorial including cardiac and pulmonary disease as below  2  Acute on chronic CHF, most likely diastolic and tachycardia induced with New A flutter  3  Chronic obstructive asthma with mild acute exacerbation  4  WILMA compliant with CPAP  5  Suspected pulmonary hypertension based on PA enlargement on CT scan, if true then most likely this is group 2/3  6  Abnormal CT scan with chronic mosaic hypoattenuation/patchy air trapping, seen on prior CT scan   This could be secondary to small airway disease, doubt hypersensitivity pneumonitis with no clear cause, also less likely RB-ILD that is smoking related  7  Morbid obesity  8   Tobacco abuse  Plan:   · Continue oxygen, titrate for pulse ox more than 88%  · Continue to diurese as per Cardiology and also rate controlled/anticoagulation for A flutter  · Encourage smoking cessation  · Continue Breo daily after discharge, during this admission I will start patient on Pulmicort b i d   · Decrease methyl prednisone to Q 12 hours today and switch to prednisone tomorrow then taper by 10 mg every 3 days to stop  · Continue bronchodilators nebulizer therapy  · Encourage weight loss  · Continue CPAP q h s  · Follow echocardiogram and if has severe pulmonary hypertension then further workup to be decided with RHC and/or V/Q scan and other appropriate interventions  · Encourage out of bed and ambulation     Consultation - GI   Abril Reusing 59 y o  female MRN: [id number]  Unit/Bed#: -02 Encounter: [id number]     Consults     ASSESSMENT:  1  Abnormal CT - hep flexure density likely collapsed bowel, but cannot rule out underlying malignancy in a patient with new anemia  Also will need anticoagulation for her new onset AFib so will need to assess  2  Anemia - new since 2017 - multifactorial, h/o gastric bypass surgery, no overt GI bleeding, last EGD/Colon over 9 years ago  Check iron panel  3  Acute respiratory failure with hypoxia  4  New onset AFib and CHF - feeling better today     PLAN:  1  Add on iron panel to characterize the anemia  2  Likely will benefit from IV Iron at one point, but follow H&H and transfuse prn  3  D/C Coumadin, and OK to continue Lovenox for now for new onset AFib  4  Need cardiac/pulm clearance for EGD/Colonoscopy Monday     5  Start some dulcolax tonight, clear liquids, and will start prep tomorrow       Consultation - Cardiology   Abril Reariadna 59 y o  female MRN: [id number]  Unit/Bed#: -02 Encounter: [id number]  12/01/18  7:09 PM        Assessment:  Principal Problem:    Acute respiratory failure with hypoxia ([facility] )  Active Problems:    Benign essential hypertension    Obstructive sleep apnea    Iron deficiency anemia    Severe persistent asthma with acute exacerbation    Tobacco use    Type 2 diabetes mellitus, without long-term current use of insulin (HCC)    Acute congestive heart failure (HCC)    Atrial flutter (HCC)    Abnormal computed tomography angiography (CTA) of abdomen    Abnormal CT of the abdomen    Iron deficiency anemia due to chronic blood loss          Chief Complaint   Patient presents with    Asthma       Patient presents to the ER stating she was sent from dr Michael Mariano office for recurrent asthma symptoms after a course of prednisone and antibiotics  sent for cxr       Admitting diagnosis:  Diabetes mellitus ([place] [address] ) [E11 9]  Chronic obstructive asthma ([place] [address] ) [J44 9]  Asthma [J45 909]  Tobacco use [Z72 0]  Atrial fibrillation, unspecified type ([place] [address] ) [I48 91]        Impression:     78-year-old presenting with acute respiratory failure-predominantly upper airway disease, also with new onset atrial flutter with rapid rates-currently on a Cardizem drip with modest control only, with lower extremity edema but no pulmonary edema on CT  CT also suggested pulmonary artery enlargement with concern for pulmonary hypertension  CT was negative for PE      Plan:     Atrial flutter:  Currently on a Cardizem drip  She did receive Cardizem boluses prior to initiation of the drip  Currently without good control, will not use beta-blocker due to wheezing, start p o  Cardizem at 60 mg 3 times daily, can be increased further tomorrow  If without response, start digoxin with load tomorrow morning  Would benefit from MITCH-cardioversion  - -provided GI clearance is obtained-new anemia and is scheduled for EGD and colonoscopy on Monday    TSH was normal, echo with preserved LV function  She is on therapeutic anticoagulation with Lovenox at this time      Anemia:  Baseline hemoglobin around 13, here around 10 , concern for hepatic flexure density-possibly just collapsed bowel, will be undergoing a colonoscopy and endoscopy on Monday, await completion of EGD and colonoscopy prior to proceeding with MITCH-cardioversion     Lower extremity edema:  Might be a component of right-sided congestive heart failure, Lasix-extra 40 mg IV now, continue 40 IV twice daily      Pulmonary artery enlargement on CT:  I reviewed her echocardiogram from the floor  I do not see any signs of right ventricular overload or enlargement  Pulmonary pressures while suboptimally measured, but not elevated  She had does admit to using weight loss drugs about 40 years ago but could not recall what they were  Once her acute issues have resolved, a repeat limited echo to re-evaluate pulmonary pressures would suffice      Smoking cessation         Test Results after admission  Pertinent Lab tests (dates/results):  Culture results (blood, urine, spinal, wound, respiratory, etc ):  Imaging tests (dates/results):  EKG (dates/results): Other test (dates/results):  Tests pending (dates/results):    Surgical or Invasive Procedures  Name of surgery/procedure:  Case ID: [id number] Case Time: 12/3/2018  1:56 PM Surgeon: Sandra Dunn MD   Procedure: ESOPHAGOGASTRODUODENOSCOPY (EGD) AND COLONOSCOPY Location: [facility]IN OR          []Hide copied text  []Hover for attribution information  ESOPHAGOGASTRODUODENOSCOPY(EGD) and COLONOSCOPY     SEDATION: Monitored anesthesia care, check anesthesia records     ASA Class: 3     INDICATIONS: Iron Deficiency Anemia, abnormal abdominal CT @ hepatic flexure     CONSENT:  Informed consent was obtained for the procedure, including sedation after explaining the risks and benefits of the procedure  Risks including but not limited to bleeding, perforation, infection, and missed lesion      PREPARATION:   Telemetry, pulse oximetry, blood pressure were monitored throughout the procedure  Patient was identified by myself both verbally and by visual inspection of ID band            PROCEDURE: EGD     DESCRIPTION:   Patient was placed in the left lateral decubitus position and was sedated with the above medication   The gastroscope was introduced in to the oropharynx and the esophagus was intubated under direct visualization  Scope was passed down the esophagus up to efferent and afferent limbs of the small bowel bypassed regions  A careful inspection was made as the gastroscope was withdrawn, including a retroflexed view of the stomach; findings and interventions are described below       SPECIMENS TAKEN:       Esophageal biopsies rule out EOE and Candida      ESTIMATED BLOOD LOSS:       Minimal        FINDINGS:     #1  Esophagus- White plaques in the esophagus diffusely, unclear if this is dry mucus or candida  Biopsies taken  Small hiatal hernia      #2  Stomach- remnant pouch to the anastomosis without any abnormalities  Normal mucosa @ the anastomosis      #3  Jejunum- normal mucosa noted on the efferent and afferent limbs of the small bowel distal to the anastomosis              PROCEDURE: COLONOSCOPY     DESCRIPTION:      Prior colonoscopy: 9 years ago      Informed consent was obtained for the procedure, including sedation  Risks of perforation, hemorrhage, adverse drug reaction and aspiration were discussed  The patient was placed in the left lateral decubitus position  Based on the pre-procedure assessment, including review of the patient's medical history, medications, allergies, and review of systems, she had been deemed to be an appropriate candidate for conscious sedation; she was therefore sedated with the medications listed below  The patient was monitored continuously with telemetry, pulse oximetry, blood pressure monitoring, and direct observations  A rectal examination was performed  The pediatric colonoscope was inserted into the rectum and advanced under direct vision to the cecum, which was identified by the ileocecal valve and appendiceal orifice  The quality of the colonic preparation was fair    A careful inspection was made as the colonoscope was withdrawn, including a retroflexed view of the rectum; findings and interventions are described below      SPECIMENS TAKEN:       Cecal polyp     ESTIMATED BLOOD LOSS:       minimal        FINDINGS:  1  Only fair preparation in the cecum and proximal right colon  Scattered areas of solid stool throughout the colon  Inadequate preparation for polyp surveillance  2  One 6mm cecal polyp, removed with hot snare  3  Sigmoid diverticulosis  4  Grade 3 internal hemorrhoids  5  Area of the transverse colon examined carefully and no masses or mucosal abnormalities found                  COMPLICATIONS:   None; patient tolerated the procedure well      IMPRESSION:    White plaques in the esophagus, biopsied  Hiatal Hernia  Normal anastomosis of the Tima-en-Y  One cecal polyp, removed  Normal hepatic flexure  Sigmoid diverticulosis  Internal hemorrhoids  Fair prep     RECOMMENDATIONS:  Repeat colonoscopy in 1 years or sooner if clinically indicated  Repeat colonoscopy is being recommended at an interval of less than 10 years, this is because of an inadequate bowel preparation  Resume home meds  Resume previous diet  We will call you with the results of the biopsy in 2 weeks  Will follow up as outpatient for repeat colonoscopy in one year  Hematology consult as outpatient for IV Iron  GI follow up in 2 months as outpatient  Please call with questions              DISPOSITION: PACU           CONDITION: Stable                  Response to Treatment, Major Change in Condition, Major Charge in Treatment anytime during admission    Disposition on Discharge  Home, Rehab, SNF, LTC, Shelter, etc : Home/Self Care    Cease to Breathe (CTB)  If a patient expires during an admission, in addition to the above information, please include:    Summary/timeline of the patient's decline in condition:    Medications and treatment:    Patient response to treatment:    Date and time patient ceased to breathe:        Is there a Readmission that follows this admission?    Yes X No    If yes, reason for denial:          InterQual Review    InterQual Criteria Met: X Yes  No  N/A        Please include the InterQual Review, InterQual year/version used, and the criteria selected:   Patient: [facility]  Review Number: [id number]  Review Status: In Primary  Criteria Status: Acute Met  Day Met: Episode Day 1     Condition Specific: Yes        OUTCOMES  Outcome Type: Primary           REVIEW DETAILS     Product: Miguel Derrick Adult  Subset: General Medical      (Symptom or finding within 24h)         (Excludes PO medications unless noted)          [X] Select Day, One:              [X] Episode Day 1, One:                  [X] ACUTE, >= One:                      [X] Respiratory, One:                          [X] Other respiratory diagnosis, actual or suspected, >= One:                              [X] Dyspnea, Both:                                  [X] Oxygenation < baseline, >= One:                                      [X] O2 sat <= 89%(0 89)                                  [X] Requiring supplemental oxygen     Version: InterQual® 2017 2  Annel Jessi and [id number]  © 2017 [employer] and/or one of its subsidiaries  All Rights Reserved  CPT only © 2016 American [employer] Association  All Rights Reserved  PLEASE SUBMIT THE COMPLETED FORM TO THE DEPARTMENT OF HUMAN SERVICES - DIVISION OF CLINICAL  REVIEW VIA FAX -[phone] or VIA E-MAIL TO [email or web address]    Signature: So Thomas Date:  02/08/19    Confidentiality Notice: The documents accompanying this telecopy may contain confidential information belonging to the sender  The information is intended only for the use of the individual named above  If you are not the intended recipient, you are hereby notified  That any disclosure, copying, distribution or taking of any telecopy is strictly prohibited

## 2019-02-21 DIAGNOSIS — E11.65 TYPE 2 DIABETES MELLITUS WITH HYPERGLYCEMIA, UNSPECIFIED WHETHER LONG TERM INSULIN USE (HCC): Primary | ICD-10-CM

## 2019-02-25 DIAGNOSIS — E11.65 TYPE 2 DIABETES MELLITUS WITH HYPERGLYCEMIA, UNSPECIFIED WHETHER LONG TERM INSULIN USE (HCC): Primary | ICD-10-CM

## 2019-02-27 RX ORDER — LANCETS
EACH MISCELLANEOUS 3 TIMES DAILY
Qty: 100 EACH | Refills: 5 | Status: SHIPPED | OUTPATIENT
Start: 2019-02-27 | End: 2021-09-07 | Stop reason: SDUPTHER

## 2019-03-05 DIAGNOSIS — I10 ESSENTIAL HYPERTENSION: ICD-10-CM

## 2019-03-05 DIAGNOSIS — I50.31 ACUTE DIASTOLIC CONGESTIVE HEART FAILURE (HCC): ICD-10-CM

## 2019-03-05 DIAGNOSIS — I48.91 ATRIAL FIBRILLATION, UNSPECIFIED TYPE (HCC): Primary | ICD-10-CM

## 2019-03-05 DIAGNOSIS — Z79.899 ENCOUNTER FOR LONG-TERM (CURRENT) DRUG USE: ICD-10-CM

## 2019-03-05 DIAGNOSIS — I48.91 ATRIAL FIBRILLATION, UNSPECIFIED TYPE (HCC): ICD-10-CM

## 2019-03-06 RX ORDER — FUROSEMIDE 40 MG/1
40 TABLET ORAL 2 TIMES DAILY
Qty: 60 TABLET | Refills: 5 | Status: SHIPPED | OUTPATIENT
Start: 2019-03-06 | End: 2020-01-06 | Stop reason: SDUPTHER

## 2019-03-06 RX ORDER — FLECAINIDE ACETATE 100 MG/1
100 TABLET ORAL EVERY 12 HOURS SCHEDULED
Qty: 60 TABLET | Refills: 5 | Status: SHIPPED | OUTPATIENT
Start: 2019-03-06 | End: 2019-09-04 | Stop reason: SDUPTHER

## 2019-03-06 RX ORDER — METOPROLOL SUCCINATE 50 MG/1
50 TABLET, EXTENDED RELEASE ORAL DAILY
Qty: 90 TABLET | Refills: 3 | Status: SHIPPED | OUTPATIENT
Start: 2019-03-06 | End: 2019-11-20 | Stop reason: SDUPTHER

## 2019-03-06 RX ORDER — POTASSIUM CHLORIDE 20 MEQ/1
20 TABLET, EXTENDED RELEASE ORAL 2 TIMES DAILY
Qty: 60 TABLET | Refills: 5 | Status: SHIPPED | OUTPATIENT
Start: 2019-03-06 | End: 2019-09-04 | Stop reason: SDUPTHER

## 2019-03-06 RX ORDER — TRAMADOL HYDROCHLORIDE 50 MG/1
TABLET ORAL
Qty: 120 TABLET | Refills: 0 | Status: SHIPPED | OUTPATIENT
Start: 2019-03-06 | End: 2019-05-15 | Stop reason: SDUPTHER

## 2019-03-07 ENCOUNTER — OFFICE VISIT (OUTPATIENT)
Dept: FAMILY MEDICINE CLINIC | Facility: HOSPITAL | Age: 59
End: 2019-03-07
Payer: COMMERCIAL

## 2019-03-07 VITALS
HEIGHT: 63 IN | SYSTOLIC BLOOD PRESSURE: 138 MMHG | DIASTOLIC BLOOD PRESSURE: 80 MMHG | BODY MASS INDEX: 44.76 KG/M2 | WEIGHT: 252.6 LBS | TEMPERATURE: 97.7 F | HEART RATE: 88 BPM

## 2019-03-07 DIAGNOSIS — F32.1 CURRENT MODERATE EPISODE OF MAJOR DEPRESSIVE DISORDER WITHOUT PRIOR EPISODE (HCC): ICD-10-CM

## 2019-03-07 DIAGNOSIS — R93.5 ABNORMAL COMPUTED TOMOGRAPHY ANGIOGRAPHY (CTA) OF ABDOMEN: ICD-10-CM

## 2019-03-07 DIAGNOSIS — I10 BENIGN ESSENTIAL HYPERTENSION: ICD-10-CM

## 2019-03-07 DIAGNOSIS — E11.9 TYPE 2 DIABETES MELLITUS WITHOUT COMPLICATION, WITHOUT LONG-TERM CURRENT USE OF INSULIN (HCC): ICD-10-CM

## 2019-03-07 DIAGNOSIS — Z12.31 ENCOUNTER FOR SCREENING MAMMOGRAM FOR MALIGNANT NEOPLASM OF BREAST: Primary | ICD-10-CM

## 2019-03-07 DIAGNOSIS — J45.40 MODERATE PERSISTENT ASTHMA WITHOUT COMPLICATION: ICD-10-CM

## 2019-03-07 DIAGNOSIS — I48.91 ATRIAL FIBRILLATION, UNSPECIFIED TYPE (HCC): ICD-10-CM

## 2019-03-07 LAB — SL AMB POCT HEMOGLOBIN AIC: 7.6 (ref ?–6.5)

## 2019-03-07 PROCEDURE — 3008F BODY MASS INDEX DOCD: CPT | Performed by: FAMILY MEDICINE

## 2019-03-07 PROCEDURE — 3045F PR MOST RECENT HEMOGLOBIN A1C LEVEL 7.0-9.0%: CPT | Performed by: FAMILY MEDICINE

## 2019-03-07 PROCEDURE — 99214 OFFICE O/P EST MOD 30 MIN: CPT | Performed by: FAMILY MEDICINE

## 2019-03-07 PROCEDURE — 83036 HEMOGLOBIN GLYCOSYLATED A1C: CPT | Performed by: FAMILY MEDICINE

## 2019-03-07 PROCEDURE — 3075F SYST BP GE 130 - 139MM HG: CPT | Performed by: FAMILY MEDICINE

## 2019-03-07 PROCEDURE — 3079F DIAST BP 80-89 MM HG: CPT | Performed by: FAMILY MEDICINE

## 2019-03-07 RX ORDER — VENLAFAXINE HYDROCHLORIDE 75 MG/1
75 TABLET, EXTENDED RELEASE ORAL
Qty: 30 TABLET | Refills: 0 | Status: SHIPPED | OUTPATIENT
Start: 2019-03-07 | End: 2019-03-27 | Stop reason: SDUPTHER

## 2019-03-07 RX ORDER — VENLAFAXINE HYDROCHLORIDE 37.5 MG/1
TABLET, EXTENDED RELEASE ORAL
Qty: 7 TABLET | Refills: 0 | Status: SHIPPED | OUTPATIENT
Start: 2019-03-07 | End: 2019-10-18

## 2019-03-07 NOTE — PROGRESS NOTES
Assessment/Plan:        Problem List Items Addressed This Visit        Endocrine    Type 2 diabetes mellitus, without long-term current use of insulin (HCC)       Respiratory    Moderate persistent asthma without complication       Cardiovascular and Mediastinum    Atrial fibrillation (HCC)    Benign essential hypertension       Other    Abnormal computed tomography angiography (CTA) of abdomen     colonscopy done in December with colonic polyp  Current moderate episode of major depressive disorder without prior episode (HCC)    Relevant Medications    venlafaxine 37 5 mg 24 hr tablet    venlafaxine 75 mg 24 hr tablet      Other Visit Diagnoses     Encounter for screening mammogram for malignant neoplasm of breast    -  Primary    Relevant Orders    Mammo screening bilateral w 3d & cad       Patient with ongoing depressive symptoms  I think this does not explain her fatigue and tiredness on a daily basis  Agree with Cardiology with this  Advised a trial of Effexor  Will start at 37 5 mg once a day for a week and increase to 75 mg once a day  Will monitor her moods  Discussed potential side effects and adverse reaction of the medication  She will try to lowering her dose of trazodone and see how it affects her sleep  I am hoping that with the treatment of depression her sleep is improved  She does have chronic pain  She has been using tramadol for a while  She is going to try to cut back on this as previously advised  Atrial fibrillation  Remains stable  Continue same regimen  Asthma  Advised using the Breo regularly  This would avoid using the albuterol to a minimum  Advised that increase use of albuterol in caffeine may lead into rapid atrial fibrillation  Diabetes mellitus  Will continue his same regimen  She will intensify her dietary control  She is using insulin 2 units with meals    She will discontinue this as she requests but she will continue to monitor blood sugars closely  To call our office if any concerns/questions at [phone]       ______________________________________________________________      Chief Complaint   Patient presents with    Follow-up    Fatigue       History of Present Illness:  Here for follow up of    Here for follow-up of chronic disease  1  Diabetes mellitus  Current A1c in the office is 7 6  She reports doing better with her diet  Trying to increase her activity  Has not been doing as well as she can  Her stressors are making her eat more  Tolerating the medicine well  However metformin may be causing some upset stomach  She is wanting to watch in how this works out 1st     2  Atrial fibrillation  Recently saw Cardiology  In addition to her cardiac disease  Due to her ongoing fatigue they are worried about her use of trazodone and tramadol  Otherwise her atrial fibrillation seems to be adequately treated  She continues on anticoagulants, rhythm control and rate control  No signs of bleeding  3  Fatigue and tiredness  As above she is on trazodone which she has cut in half  Her sleep however remains poor  Ongoing fatigue  On review she has been feeling depressed  She has been overwhelmed to multiple stressors  Her father recently   Boyfriend going through cancer treatment  She is dealing with her own medical issues as well  However no suicidal ideation no suicidal ideation  No homicidal ideation  There is a decrease in energy, lack of interest in doing things  Her sleep is off  She has trouble concentrating  4  Asthma  Using albuterol on a daily basis  She had forgets to use of Breo regularly  Review of Systems   Constitutional: Negative  Negative for activity change, appetite change, chills, diaphoresis, fatigue and fever  HENT: Negative for congestion, facial swelling and sore throat  Respiratory: Negative  Negative for apnea, cough, chest tightness and shortness of breath  Cardiovascular: Negative  Negative for chest pain and palpitations  Gastrointestinal: Negative  Negative for abdominal distention, abdominal pain, blood in stool, constipation, diarrhea and nausea  Genitourinary: Negative  Negative for difficulty urinating, dysuria, flank pain and frequency         Social History     Socioeconomic History    Marital status: Significant Other     Spouse name: Not on file    Number of children: Not on file    Years of education: Not on file    Highest education level: Not on file   Occupational History    Not on file   Social Needs    Financial resource strain: Not on file    Food insecurity:     Worry: Not on file     Inability: Not on file    Transportation needs:     Medical: Not on file     Non-medical: Not on file   Tobacco Use    Smoking status: Current Every Day Smoker     Packs/day: 0 25     Years: 9 00     Pack years: 2 25     Types: Cigarettes    Smokeless tobacco: Never Used    Tobacco comment: hasn't smoked for a few days d/t being sick   Substance and Sexual Activity    Alcohol use: Yes     Comment: about 6 coors light every night    Drug use: No    Sexual activity: Not on file   Lifestyle    Physical activity:     Days per week: Not on file     Minutes per session: Not on file    Stress: Not on file   Relationships    Social connections:     Talks on phone: Not on file     Gets together: Not on file     Attends Jew service: Not on file     Active member of club or organization: Not on file     Attends meetings of clubs or organizations: Not on file     Relationship status: Not on file    Intimate partner violence:     Fear of current or ex partner: Not on file     Emotionally abused: Not on file     Physically abused: Not on file     Forced sexual activity: Not on file   Other Topics Concern    Not on file   Social History Narrative    Not on file               Allergies   Allergen Reactions    Iron Dextran Swelling    Januvia [Sitagliptin] Shortness Of Breath    Jardiance [Empagliflozin] Shortness Of Breath    Clonazepam Other (See Comments)     Unknown reaction    Codeine Itching and Other (See Comments)     itch;mary kay morphine,takes ultram @home    Adhesive [Medical Tape]      itching    Lactose     Oxycodone-Acetaminophen      Other reaction(s): Other (See Comments)  (PERCOCET) MILD RASH    Oxycodone-Acetaminophen Anxiety           Vitals:    03/07/19 1348   BP: 138/80   Pulse: 88   Temp: 97 7 °F (36 5 °C)     Body mass index is 44 75 kg/m²  Physical Exam   Constitutional: She appears well-developed and well-nourished  HENT:   Head: Normocephalic and atraumatic  Eyes: Pupils are equal, round, and reactive to light  Conjunctivae and EOM are normal    Neck: Normal range of motion  Neck supple  Cardiovascular: Normal rate, regular rhythm and normal heart sounds  Pulmonary/Chest: Effort normal and breath sounds normal    Abdominal: Soft  Bowel sounds are normal    Skin: Skin is warm and dry  Psychiatric: Her speech is normal and behavior is normal  Thought content normal  Her mood appears not anxious  Her affect is not labile and not inappropriate  She exhibits a depressed mood  She is attentive  Nursing note and vitals reviewed  Return in about 2 weeks (around 3/21/2019)

## 2019-03-27 DIAGNOSIS — J44.9 CHRONIC OBSTRUCTIVE PULMONARY DISEASE, UNSPECIFIED COPD TYPE (HCC): ICD-10-CM

## 2019-03-27 DIAGNOSIS — F32.1 CURRENT MODERATE EPISODE OF MAJOR DEPRESSIVE DISORDER WITHOUT PRIOR EPISODE (HCC): ICD-10-CM

## 2019-03-27 DIAGNOSIS — E11.65 TYPE 2 DIABETES MELLITUS WITH HYPERGLYCEMIA, WITHOUT LONG-TERM CURRENT USE OF INSULIN (HCC): ICD-10-CM

## 2019-03-27 DIAGNOSIS — Z79.899 ENCOUNTER FOR LONG-TERM (CURRENT) DRUG USE: ICD-10-CM

## 2019-03-27 RX ORDER — VENLAFAXINE HYDROCHLORIDE 75 MG/1
75 TABLET, EXTENDED RELEASE ORAL
Qty: 30 TABLET | Refills: 0 | Status: SHIPPED | OUTPATIENT
Start: 2019-03-27 | End: 2019-04-18 | Stop reason: SDUPTHER

## 2019-03-27 RX ORDER — LORAZEPAM 0.5 MG/1
TABLET ORAL
Qty: 90 TABLET | Refills: 1 | Status: SHIPPED | OUTPATIENT
Start: 2019-03-27 | End: 2019-05-23 | Stop reason: SDUPTHER

## 2019-03-27 RX ORDER — GLIMEPIRIDE 4 MG/1
4 TABLET ORAL
Qty: 30 TABLET | Refills: 2 | Status: SHIPPED | OUTPATIENT
Start: 2019-03-27 | End: 2019-07-02 | Stop reason: SDUPTHER

## 2019-03-27 RX ORDER — METFORMIN HYDROCHLORIDE 500 MG/1
1000 TABLET, EXTENDED RELEASE ORAL 2 TIMES DAILY WITH MEALS
Qty: 120 TABLET | Refills: 5 | Status: SHIPPED | OUTPATIENT
Start: 2019-03-27 | End: 2019-10-07 | Stop reason: SDUPTHER

## 2019-04-18 DIAGNOSIS — F32.1 CURRENT MODERATE EPISODE OF MAJOR DEPRESSIVE DISORDER WITHOUT PRIOR EPISODE (HCC): ICD-10-CM

## 2019-04-18 RX ORDER — VENLAFAXINE HYDROCHLORIDE 75 MG/1
75 TABLET, EXTENDED RELEASE ORAL
Qty: 30 TABLET | Refills: 3 | Status: SHIPPED | OUTPATIENT
Start: 2019-04-18 | End: 2019-10-18 | Stop reason: SDUPTHER

## 2019-05-10 ENCOUNTER — OFFICE VISIT (OUTPATIENT)
Dept: FAMILY MEDICINE CLINIC | Facility: HOSPITAL | Age: 59
End: 2019-05-10
Payer: COMMERCIAL

## 2019-05-10 VITALS
DIASTOLIC BLOOD PRESSURE: 84 MMHG | WEIGHT: 259.6 LBS | HEIGHT: 63 IN | HEART RATE: 103 BPM | OXYGEN SATURATION: 94 % | SYSTOLIC BLOOD PRESSURE: 138 MMHG | BODY MASS INDEX: 46 KG/M2 | TEMPERATURE: 98.3 F

## 2019-05-10 DIAGNOSIS — G47.33 OBSTRUCTIVE SLEEP APNEA: ICD-10-CM

## 2019-05-10 DIAGNOSIS — G89.29 NECK PAIN, CHRONIC: ICD-10-CM

## 2019-05-10 DIAGNOSIS — K21.9 GASTROESOPHAGEAL REFLUX DISEASE WITHOUT ESOPHAGITIS: Primary | ICD-10-CM

## 2019-05-10 DIAGNOSIS — M54.50 CHRONIC LOW BACK PAIN WITHOUT SCIATICA, UNSPECIFIED BACK PAIN LATERALITY: ICD-10-CM

## 2019-05-10 DIAGNOSIS — M54.2 NECK PAIN, CHRONIC: ICD-10-CM

## 2019-05-10 DIAGNOSIS — G89.29 CHRONIC LOW BACK PAIN WITHOUT SCIATICA, UNSPECIFIED BACK PAIN LATERALITY: ICD-10-CM

## 2019-05-10 DIAGNOSIS — E11.65 TYPE 2 DIABETES MELLITUS WITH HYPERGLYCEMIA, WITHOUT LONG-TERM CURRENT USE OF INSULIN (HCC): ICD-10-CM

## 2019-05-10 PROCEDURE — 99214 OFFICE O/P EST MOD 30 MIN: CPT | Performed by: FAMILY MEDICINE

## 2019-05-10 PROCEDURE — 3008F BODY MASS INDEX DOCD: CPT | Performed by: FAMILY MEDICINE

## 2019-05-10 RX ORDER — OMEPRAZOLE 40 MG/1
40 CAPSULE, DELAYED RELEASE ORAL DAILY
Qty: 90 CAPSULE | Refills: 3 | Status: SHIPPED | OUTPATIENT
Start: 2019-05-10 | End: 2020-04-28 | Stop reason: SDUPTHER

## 2019-05-11 PROBLEM — G89.29 NECK PAIN, CHRONIC: Status: ACTIVE | Noted: 2019-05-11

## 2019-05-11 PROBLEM — M54.2 NECK PAIN, CHRONIC: Status: ACTIVE | Noted: 2019-05-11

## 2019-05-11 LAB
ALBUMIN/CREAT UR: ABNORMAL MG/G CREAT (ref 0–30)
CREAT UR-MCNC: 6.3 MG/DL
MICROALBUMIN UR-MCNC: <3 UG/ML

## 2019-05-13 DIAGNOSIS — Z79.899 ENCOUNTER FOR LONG-TERM (CURRENT) DRUG USE: ICD-10-CM

## 2019-05-15 DIAGNOSIS — Z79.899 ENCOUNTER FOR LONG-TERM (CURRENT) DRUG USE: ICD-10-CM

## 2019-05-15 DIAGNOSIS — G89.29 NECK PAIN, CHRONIC: Primary | ICD-10-CM

## 2019-05-15 DIAGNOSIS — M54.50 CHRONIC LOW BACK PAIN WITHOUT SCIATICA, UNSPECIFIED BACK PAIN LATERALITY: ICD-10-CM

## 2019-05-15 DIAGNOSIS — M54.2 NECK PAIN, CHRONIC: Primary | ICD-10-CM

## 2019-05-15 DIAGNOSIS — G89.29 CHRONIC LOW BACK PAIN WITHOUT SCIATICA, UNSPECIFIED BACK PAIN LATERALITY: ICD-10-CM

## 2019-05-15 PROCEDURE — 2022F DILAT RTA XM EVC RTNOPTHY: CPT | Performed by: FAMILY MEDICINE

## 2019-05-15 PROCEDURE — 3045F PR MOST RECENT HEMOGLOBIN A1C LEVEL 7.0-9.0%: CPT | Performed by: FAMILY MEDICINE

## 2019-05-15 RX ORDER — TRAMADOL HYDROCHLORIDE 50 MG/1
TABLET ORAL
Qty: 120 TABLET | Refills: 0 | Status: SHIPPED | OUTPATIENT
Start: 2019-05-15 | End: 2019-06-15 | Stop reason: SDUPTHER

## 2019-05-15 RX ORDER — TRAMADOL HYDROCHLORIDE 50 MG/1
TABLET ORAL
Qty: 120 TABLET | Refills: 0 | Status: CANCELLED | OUTPATIENT
Start: 2019-05-15

## 2019-05-16 ENCOUNTER — TELEPHONE (OUTPATIENT)
Dept: FAMILY MEDICINE CLINIC | Facility: HOSPITAL | Age: 59
End: 2019-05-16

## 2019-05-16 DIAGNOSIS — G47.00 INSOMNIA, UNSPECIFIED TYPE: ICD-10-CM

## 2019-05-16 RX ORDER — TRAZODONE HYDROCHLORIDE 150 MG/1
150 TABLET ORAL
Qty: 30 TABLET | Refills: 5 | Status: SHIPPED | OUTPATIENT
Start: 2019-05-16 | End: 2019-11-01 | Stop reason: SDUPTHER

## 2019-05-17 DIAGNOSIS — F11.90 CHRONIC NARCOTIC USE: Primary | ICD-10-CM

## 2019-05-23 ENCOUNTER — TELEPHONE (OUTPATIENT)
Dept: FAMILY MEDICINE CLINIC | Facility: HOSPITAL | Age: 59
End: 2019-05-23

## 2019-05-23 DIAGNOSIS — J45.909 UNCOMPLICATED ASTHMA, UNSPECIFIED ASTHMA SEVERITY, UNSPECIFIED WHETHER PERSISTENT: ICD-10-CM

## 2019-05-23 DIAGNOSIS — Z79.899 ENCOUNTER FOR LONG-TERM (CURRENT) DRUG USE: ICD-10-CM

## 2019-05-24 RX ORDER — ALBUTEROL SULFATE 90 UG/1
2 AEROSOL, METERED RESPIRATORY (INHALATION) EVERY 4 HOURS PRN
Qty: 1 INHALER | Refills: 3 | Status: SHIPPED | OUTPATIENT
Start: 2019-05-24 | End: 2019-09-04 | Stop reason: SDUPTHER

## 2019-05-24 RX ORDER — LORAZEPAM 0.5 MG/1
TABLET ORAL
Qty: 90 TABLET | Refills: 1 | Status: SHIPPED | OUTPATIENT
Start: 2019-05-24 | End: 2019-07-16 | Stop reason: SDUPTHER

## 2019-06-07 ENCOUNTER — TELEPHONE (OUTPATIENT)
Dept: FAMILY MEDICINE CLINIC | Facility: HOSPITAL | Age: 59
End: 2019-06-07

## 2019-06-07 LAB
AMPHETAMINES UR QL SCN: NEGATIVE NG/ML
BARBITURATES UR QL SCN: NEGATIVE NG/ML
BENZODIAZ UR QL: NEGATIVE NG/ML
BZE UR QL: NEGATIVE NG/ML
CANNABINOIDS UR QL SCN: NEGATIVE NG/ML
METHADONE UR QL SCN: NEGATIVE NG/ML
OPIATES UR QL: NEGATIVE NG/ML
PCP UR QL: NEGATIVE NG/ML
PROPOXYPH UR QL SCN: NEGATIVE NG/ML

## 2019-06-15 DIAGNOSIS — M54.2 NECK PAIN, CHRONIC: ICD-10-CM

## 2019-06-15 DIAGNOSIS — G89.29 NECK PAIN, CHRONIC: ICD-10-CM

## 2019-06-15 DIAGNOSIS — G89.29 CHRONIC LOW BACK PAIN WITHOUT SCIATICA, UNSPECIFIED BACK PAIN LATERALITY: ICD-10-CM

## 2019-06-15 DIAGNOSIS — M54.50 CHRONIC LOW BACK PAIN WITHOUT SCIATICA, UNSPECIFIED BACK PAIN LATERALITY: ICD-10-CM

## 2019-06-17 RX ORDER — TRAMADOL HYDROCHLORIDE 50 MG/1
TABLET ORAL
Qty: 120 TABLET | Refills: 0 | Status: SHIPPED | OUTPATIENT
Start: 2019-06-17 | End: 2019-07-16 | Stop reason: SDUPTHER

## 2019-07-02 DIAGNOSIS — E11.65 TYPE 2 DIABETES MELLITUS WITH HYPERGLYCEMIA, WITHOUT LONG-TERM CURRENT USE OF INSULIN (HCC): ICD-10-CM

## 2019-07-10 RX ORDER — GLIMEPIRIDE 4 MG/1
TABLET ORAL
Qty: 30 TABLET | Refills: 2 | Status: SHIPPED | OUTPATIENT
Start: 2019-07-10 | End: 2019-10-07 | Stop reason: SDUPTHER

## 2019-07-16 DIAGNOSIS — G89.29 CHRONIC LOW BACK PAIN WITHOUT SCIATICA, UNSPECIFIED BACK PAIN LATERALITY: ICD-10-CM

## 2019-07-16 DIAGNOSIS — M54.2 NECK PAIN, CHRONIC: ICD-10-CM

## 2019-07-16 DIAGNOSIS — Z79.899 ENCOUNTER FOR LONG-TERM (CURRENT) DRUG USE: ICD-10-CM

## 2019-07-16 DIAGNOSIS — M54.50 CHRONIC LOW BACK PAIN WITHOUT SCIATICA, UNSPECIFIED BACK PAIN LATERALITY: ICD-10-CM

## 2019-07-16 DIAGNOSIS — G89.29 NECK PAIN, CHRONIC: ICD-10-CM

## 2019-07-17 RX ORDER — TRAMADOL HYDROCHLORIDE 50 MG/1
TABLET ORAL
Qty: 120 TABLET | Refills: 0 | Status: SHIPPED | OUTPATIENT
Start: 2019-07-17 | End: 2019-09-04 | Stop reason: SDUPTHER

## 2019-07-17 RX ORDER — LORAZEPAM 0.5 MG/1
TABLET ORAL
Qty: 90 TABLET | Refills: 1 | Status: SHIPPED | OUTPATIENT
Start: 2019-07-17 | End: 2019-09-04 | Stop reason: SDUPTHER

## 2019-08-05 ENCOUNTER — OFFICE VISIT (OUTPATIENT)
Dept: FAMILY MEDICINE CLINIC | Facility: HOSPITAL | Age: 59
End: 2019-08-05
Payer: COMMERCIAL

## 2019-08-05 VITALS
BODY MASS INDEX: 47.27 KG/M2 | DIASTOLIC BLOOD PRESSURE: 80 MMHG | HEIGHT: 63 IN | SYSTOLIC BLOOD PRESSURE: 124 MMHG | TEMPERATURE: 96.5 F | HEART RATE: 86 BPM | WEIGHT: 266.8 LBS

## 2019-08-05 DIAGNOSIS — B37.9 CANDIDIASIS: ICD-10-CM

## 2019-08-05 DIAGNOSIS — E66.01 CLASS 3 SEVERE OBESITY WITH SERIOUS COMORBIDITY AND BODY MASS INDEX (BMI) OF 45.0 TO 49.9 IN ADULT, UNSPECIFIED OBESITY TYPE (HCC): Primary | ICD-10-CM

## 2019-08-05 DIAGNOSIS — B35.4 TINEA CORPORIS: ICD-10-CM

## 2019-08-05 DIAGNOSIS — E11.9 TYPE 2 DIABETES MELLITUS WITHOUT COMPLICATION, WITHOUT LONG-TERM CURRENT USE OF INSULIN (HCC): ICD-10-CM

## 2019-08-05 DIAGNOSIS — N89.8 VAGINAL ITCHING: ICD-10-CM

## 2019-08-05 PROCEDURE — 99214 OFFICE O/P EST MOD 30 MIN: CPT | Performed by: FAMILY MEDICINE

## 2019-08-05 PROCEDURE — 3008F BODY MASS INDEX DOCD: CPT | Performed by: FAMILY MEDICINE

## 2019-08-05 RX ORDER — FLUCONAZOLE 150 MG/1
150 TABLET ORAL ONCE
Qty: 1 TABLET | Refills: 0 | Status: SHIPPED | OUTPATIENT
Start: 2019-08-05 | End: 2019-08-05

## 2019-08-05 RX ORDER — PRENATAL VIT 91/IRON/FOLIC/DHA 28-975-200
COMBINATION PACKAGE (EA) ORAL 2 TIMES DAILY
Qty: 36 G | Refills: 0 | Status: SHIPPED | OUTPATIENT
Start: 2019-08-05 | End: 2019-10-18

## 2019-08-05 RX ORDER — MOMETASONE FUROATE 1 MG/G
OINTMENT TOPICAL DAILY
Qty: 45 G | Refills: 0 | Status: SHIPPED | OUTPATIENT
Start: 2019-08-05 | End: 2019-10-18

## 2019-08-06 NOTE — PROGRESS NOTES
[facility]  [id number] [facility]  [address] [facility], [address]  Tel: [phone]      ASSESSMENT/PLAN:    Problem List Items Addressed This Visit        Endocrine    Type 2 diabetes mellitus, without long-term current use of insulin ([address] )      Other Visit Diagnoses     Class 3 severe obesity with serious comorbidity and body mass index (BMI) of 45 0 to 49 9 in adult, unspecified obesity type (HCC)    -  Primary    Tinea corporis        Relevant Medications    terbinafine (LamISIL) 1 % cream    mometasone (ELOCON) 0 1 % ointment    Candidiasis        Vaginal itching        Relevant Medications    terbinafine (LamISIL) 1 % cream    mometasone (ELOCON) 0 1 % ointment    Other Relevant Orders    NuSwab Vaginitis Plus (VG+)          With vaginal itching and soreness of vaginal area  Suspect candidiasis  Will test for vaginitis /candidiasis as well  Empirically treat with diflucan  Also with tinea infection of the groin and inframammary area  Treat with terbinafine cram and mometasone to help with itching and inflammation  Await results  Dm stable  Will f/u in upcoming follow up  Infection may be due to uncontrolled diabetes  _____________________________________________________________________    HISTORY OF PRESENT ILLNESS:      Patient is here for an acute visit      Vaginal Itching and Fatigue        Here for itchiness  She feels this all over  There is no body rash  No new products and no new medications except for eliquis  This was changed a few weeks ago without any change  She does reports the most itching is coming from the groin and vaginal area  She also has a rash under her breasts  No vaginal discharge  No fever, no chills  No difficulty swallowing  No difficulty breathing              -----------------------------  Review of Systems   Constitutional: Negative for activity change, appetite change and fever     Respiratory: Negative for cough, choking and chest tightness          Social Hx reviewed:    Social History     Socioeconomic History    Marital status: Significant Other     Spouse name: Not on file    Number of children: Not on file    Years of education: Not on file    Highest education level: Not on file   Occupational History    Not on file   Social Needs    Financial resource strain: Not on file    Food insecurity:     Worry: Not on file     Inability: Not on file    Transportation needs:     Medical: Not on file     Non-medical: Not on file   Tobacco Use    Smoking status: Current Every Day Smoker     Packs/day: 0 25     Years: 9 00     Pack years: 2 25     Types: Cigarettes    Smokeless tobacco: Never Used    Tobacco comment: hasn't smoked for a few days d/t being sick   Substance and Sexual Activity    Alcohol use: Yes     Comment: about 6 coors light every night    Drug use: No    Sexual activity: Not on file   Lifestyle    Physical activity:     Days per week: Not on file     Minutes per session: Not on file    Stress: Not on file   Relationships    Social connections:     Talks on phone: Not on file     Gets together: Not on file     Attends Latter-day service: Not on file     Active member of club or organization: Not on file     Attends meetings of clubs or organizations: Not on file     Relationship status: Not on file    Intimate partner violence:     Fear of current or ex partner: Not on file     Emotionally abused: Not on file     Physically abused: Not on file     Forced sexual activity: Not on file   Other Topics Concern    Not on file   Social History Narrative    Not on file       Past Medical History:   Diagnosis Date    Anemia     Cervical radiculopathy     [facility]estive [facility] failure) [facility]     Chronic low back pain     [facility] 2/20/2018    Community acquired pneumonia     Diabetes mellitus ([facility] )     Diabetes mellitus with hyperglycemia ([facility] )     Elevated liver enzymes     GERD (gastroesophageal reflux disease)     Paresthesia of upper extremity     Plantar fasciitis     Restless leg     Sexual dysfunction     Sleep apnea, obstructive     Tenosynovitis of finger     Tinea corporis     Weakness of upper extremity            Allergies   Allergen Reactions    Iron Dextran Swelling    Januvia [Sitagliptin] Shortness Of Breath    Jardiance [Empagliflozin] Shortness Of Breath    Clonazepam Other (See Comments)     Unknown reaction    Codeine Itching and Other (See Comments)     itch;mary kay morphine,takes ultram @home    Adhesive [Medical Tape]      itching    Lactose     Oxycodone-Acetaminophen      Other reaction(s): Other (See Comments)  (PERCOCET) MILD RASH    Oxycodone-Acetaminophen Anxiety         Vitals:    08/05/19 1029   BP: 124/80   Pulse: 86   Temp: (!) 96 5 °F (35 8 °C)        Wt Readings from Last 1 Encounters:   08/05/19 121 kg (266 lb 12 8 oz)        Physical Exam   Constitutional: She appears well-developed and well-nourished  Genitourinary: Pelvic exam was performed with patient prone  There is tenderness on the right labia  There is no rash on the right labia  There is tenderness on the left labia  There is no rash on the left labia  There is tenderness in the vagina  No signs of injury around the vagina  No vaginal discharge found  Genitourinary Comments: Erythematous vaginal wall  No discharge seen  Tender to touch  Skin:   Erythematous patch with central clearing and satellite lesions  Under her breasts  Large area or erythematous patches, irregular borders, raised borders in the right and left inguinal area  With multiple satellite lesions  Nursing note and vitals reviewed  To call our office if any concerns/questions at [phone]

## 2019-08-07 LAB
A VAGINAE DNA VAG QL NAA+PROBE: NORMAL SCORE
BVAB2 DNA VAG QL NAA+PROBE: NORMAL SCORE
C ALBICANS DNA VAG QL NAA+PROBE: NEGATIVE
C GLABRATA DNA VAG QL NAA+PROBE: NEGATIVE
C TRACH RRNA SPEC QL NAA+PROBE: NEGATIVE
MEGA1 DNA VAG QL NAA+PROBE: NORMAL SCORE
N GONORRHOEA RRNA SPEC QL NAA+PROBE: NEGATIVE
T VAGINALIS RRNA SPEC QL NAA+PROBE: NEGATIVE

## 2019-08-14 ENCOUNTER — TELEPHONE (OUTPATIENT)
Dept: FAMILY MEDICINE CLINIC | Facility: HOSPITAL | Age: 59
End: 2019-08-14

## 2019-08-14 DIAGNOSIS — E11.9 TYPE 2 DIABETES MELLITUS WITHOUT COMPLICATION, WITHOUT LONG-TERM CURRENT USE OF INSULIN (HCC): Primary | ICD-10-CM

## 2019-08-14 DIAGNOSIS — B37.9 CANDIDIASIS: Primary | ICD-10-CM

## 2019-08-14 DIAGNOSIS — D50.9 IRON DEFICIENCY ANEMIA, UNSPECIFIED IRON DEFICIENCY ANEMIA TYPE: ICD-10-CM

## 2019-08-14 RX ORDER — FLUCONAZOLE 150 MG/1
TABLET ORAL
Qty: 2 TABLET | Refills: 0 | Status: SHIPPED | OUTPATIENT
Start: 2019-08-14 | End: 2019-08-17

## 2019-08-14 NOTE — TELEPHONE ENCOUNTER
Pt canceled her appt for today, wants to have labs done first  Please include Iron/anemia  Will make appt after

## 2019-08-14 NOTE — TELEPHONE ENCOUNTER
Rash and itching are mostly better - however, she is wondering if you can send another round of diflucan over?

## 2019-09-04 DIAGNOSIS — I50.31 ACUTE DIASTOLIC CONGESTIVE HEART FAILURE (HCC): ICD-10-CM

## 2019-09-04 DIAGNOSIS — M54.2 NECK PAIN, CHRONIC: ICD-10-CM

## 2019-09-04 DIAGNOSIS — G89.29 CHRONIC LOW BACK PAIN WITHOUT SCIATICA, UNSPECIFIED BACK PAIN LATERALITY: ICD-10-CM

## 2019-09-04 DIAGNOSIS — G89.29 NECK PAIN, CHRONIC: ICD-10-CM

## 2019-09-04 DIAGNOSIS — Z79.899 ENCOUNTER FOR LONG-TERM (CURRENT) DRUG USE: ICD-10-CM

## 2019-09-04 DIAGNOSIS — I48.91 ATRIAL FIBRILLATION, UNSPECIFIED TYPE (HCC): ICD-10-CM

## 2019-09-04 DIAGNOSIS — M54.50 CHRONIC LOW BACK PAIN WITHOUT SCIATICA, UNSPECIFIED BACK PAIN LATERALITY: ICD-10-CM

## 2019-09-04 DIAGNOSIS — J45.909 UNCOMPLICATED ASTHMA, UNSPECIFIED ASTHMA SEVERITY, UNSPECIFIED WHETHER PERSISTENT: ICD-10-CM

## 2019-09-04 RX ORDER — TRAMADOL HYDROCHLORIDE 50 MG/1
TABLET ORAL
Qty: 120 TABLET | Refills: 0 | Status: SHIPPED | OUTPATIENT
Start: 2019-09-04 | End: 2019-10-07 | Stop reason: SDUPTHER

## 2019-09-04 RX ORDER — LORAZEPAM 0.5 MG/1
TABLET ORAL
Qty: 90 TABLET | Refills: 1 | Status: SHIPPED | OUTPATIENT
Start: 2019-09-04 | End: 2019-11-01 | Stop reason: SDUPTHER

## 2019-09-04 RX ORDER — POTASSIUM CHLORIDE 20 MEQ/1
20 TABLET, EXTENDED RELEASE ORAL 2 TIMES DAILY
Qty: 60 TABLET | Refills: 5 | Status: SHIPPED | OUTPATIENT
Start: 2019-09-04 | End: 2020-03-06 | Stop reason: SDUPTHER

## 2019-09-04 RX ORDER — ALBUTEROL SULFATE 90 UG/1
2 AEROSOL, METERED RESPIRATORY (INHALATION) EVERY 4 HOURS PRN
Qty: 1 INHALER | Refills: 3 | Status: SHIPPED | OUTPATIENT
Start: 2019-09-04 | End: 2020-01-27 | Stop reason: SDUPTHER

## 2019-09-04 RX ORDER — FLECAINIDE ACETATE 100 MG/1
100 TABLET ORAL EVERY 12 HOURS SCHEDULED
Qty: 60 TABLET | Refills: 5 | Status: SHIPPED | OUTPATIENT
Start: 2019-09-04 | End: 2020-03-06 | Stop reason: SDUPTHER

## 2019-10-07 DIAGNOSIS — J44.9 CHRONIC OBSTRUCTIVE PULMONARY DISEASE, UNSPECIFIED COPD TYPE (HCC): ICD-10-CM

## 2019-10-07 DIAGNOSIS — G89.29 CHRONIC LOW BACK PAIN WITHOUT SCIATICA, UNSPECIFIED BACK PAIN LATERALITY: ICD-10-CM

## 2019-10-07 DIAGNOSIS — G89.29 NECK PAIN, CHRONIC: ICD-10-CM

## 2019-10-07 DIAGNOSIS — E11.65 TYPE 2 DIABETES MELLITUS WITH HYPERGLYCEMIA, WITHOUT LONG-TERM CURRENT USE OF INSULIN (HCC): ICD-10-CM

## 2019-10-07 DIAGNOSIS — M54.2 NECK PAIN, CHRONIC: ICD-10-CM

## 2019-10-07 DIAGNOSIS — M54.50 CHRONIC LOW BACK PAIN WITHOUT SCIATICA, UNSPECIFIED BACK PAIN LATERALITY: ICD-10-CM

## 2019-10-09 RX ORDER — METFORMIN HYDROCHLORIDE 500 MG/1
1000 TABLET, EXTENDED RELEASE ORAL 2 TIMES DAILY WITH MEALS
Qty: 120 TABLET | Refills: 5 | Status: SHIPPED | OUTPATIENT
Start: 2019-10-09 | End: 2020-04-28 | Stop reason: SDUPTHER

## 2019-10-09 RX ORDER — GLIMEPIRIDE 4 MG/1
4 TABLET ORAL
Qty: 30 TABLET | Refills: 2 | Status: SHIPPED | OUTPATIENT
Start: 2019-10-09 | End: 2020-01-06 | Stop reason: SDUPTHER

## 2019-10-09 RX ORDER — TRAMADOL HYDROCHLORIDE 50 MG/1
TABLET ORAL
Qty: 120 TABLET | Refills: 0 | Status: SHIPPED | OUTPATIENT
Start: 2019-10-09 | End: 2019-11-20 | Stop reason: SDUPTHER

## 2019-10-09 NOTE — TELEPHONE ENCOUNTER
pts boyfriend Angy Maria passed away in December she wanted to make us aware   Also she is aware that she needs an apt but is not ready at this time

## 2019-10-18 ENCOUNTER — OFFICE VISIT (OUTPATIENT)
Dept: FAMILY MEDICINE CLINIC | Facility: HOSPITAL | Age: 59
End: 2019-10-18
Payer: COMMERCIAL

## 2019-10-18 VITALS
HEIGHT: 63 IN | SYSTOLIC BLOOD PRESSURE: 130 MMHG | DIASTOLIC BLOOD PRESSURE: 84 MMHG | OXYGEN SATURATION: 94 % | HEART RATE: 90 BPM | WEIGHT: 258.8 LBS | TEMPERATURE: 96.9 F | BODY MASS INDEX: 45.86 KG/M2

## 2019-10-18 DIAGNOSIS — E11.9 TYPE 2 DIABETES MELLITUS WITHOUT COMPLICATION, WITHOUT LONG-TERM CURRENT USE OF INSULIN (HCC): ICD-10-CM

## 2019-10-18 DIAGNOSIS — I48.91 ATRIAL FIBRILLATION, UNSPECIFIED TYPE (HCC): ICD-10-CM

## 2019-10-18 DIAGNOSIS — Z23 ENCOUNTER FOR IMMUNIZATION: ICD-10-CM

## 2019-10-18 DIAGNOSIS — J45.40 MODERATE PERSISTENT ASTHMA WITHOUT COMPLICATION: Primary | ICD-10-CM

## 2019-10-18 DIAGNOSIS — E66.01 CLASS 3 SEVERE OBESITY WITH BODY MASS INDEX (BMI) OF 40.0 TO 44.9 IN ADULT, UNSPECIFIED OBESITY TYPE, UNSPECIFIED WHETHER SERIOUS COMORBIDITY PRESENT (HCC): ICD-10-CM

## 2019-10-18 DIAGNOSIS — M54.50 CHRONIC LOW BACK PAIN WITHOUT SCIATICA, UNSPECIFIED BACK PAIN LATERALITY: ICD-10-CM

## 2019-10-18 DIAGNOSIS — G89.29 CHRONIC LOW BACK PAIN WITHOUT SCIATICA, UNSPECIFIED BACK PAIN LATERALITY: ICD-10-CM

## 2019-10-18 DIAGNOSIS — B35.4 TINEA CORPORIS: ICD-10-CM

## 2019-10-18 DIAGNOSIS — G47.33 OBSTRUCTIVE SLEEP APNEA: ICD-10-CM

## 2019-10-18 DIAGNOSIS — F32.1 CURRENT MODERATE EPISODE OF MAJOR DEPRESSIVE DISORDER WITHOUT PRIOR EPISODE (HCC): ICD-10-CM

## 2019-10-18 DIAGNOSIS — E78.00 HYPERCHOLESTEROLEMIA: ICD-10-CM

## 2019-10-18 LAB
ALBUMIN SERPL-MCNC: 4.1 G/DL (ref 3.6–4.8)
ALBUMIN/GLOB SERPL: 2 {RATIO} (ref 1.2–2.2)
ALP SERPL-CCNC: 123 IU/L (ref 39–117)
ALT SERPL-CCNC: 35 IU/L (ref 0–32)
AST SERPL-CCNC: 25 IU/L (ref 0–40)
BILIRUB SERPL-MCNC: 0.2 MG/DL (ref 0–1.2)
BUN SERPL-MCNC: 7 MG/DL (ref 8–27)
BUN/CREAT SERPL: 11 (ref 12–28)
CALCIUM SERPL-MCNC: 9 MG/DL (ref 8.7–10.3)
CHLORIDE SERPL-SCNC: 97 MMOL/L (ref 96–106)
CHOLEST SERPL-MCNC: 139 MG/DL (ref 100–199)
CO2 SERPL-SCNC: 27 MMOL/L (ref 20–29)
CREAT SERPL-MCNC: 0.63 MG/DL (ref 0.57–1)
ERYTHROCYTE [DISTWIDTH] IN BLOOD BY AUTOMATED COUNT: 17.1 % (ref 12.3–15.4)
EST. AVERAGE GLUCOSE BLD GHB EST-MCNC: 146 MG/DL
GLOBULIN SER-MCNC: 2.1 G/DL (ref 1.5–4.5)
GLUCOSE SERPL-MCNC: 122 MG/DL (ref 65–99)
HBA1C MFR BLD: 6.7 % (ref 4.8–5.6)
HCT VFR BLD AUTO: 37.8 % (ref 34–46.6)
HDLC SERPL-MCNC: 62 MG/DL
HGB BLD-MCNC: 11.8 G/DL (ref 11.1–15.9)
IRON SERPL-MCNC: 19 UG/DL (ref 27–159)
LDLC SERPL CALC-MCNC: 57 MG/DL (ref 0–99)
LDLC/HDLC SERPL: 0.9 RATIO (ref 0–3.2)
MCH RBC QN AUTO: 22.9 PG (ref 26.6–33)
MCHC RBC AUTO-ENTMCNC: 31.2 G/DL (ref 31.5–35.7)
MCV RBC AUTO: 73 FL (ref 79–97)
PLATELET # BLD AUTO: 289 X10E3/UL (ref 150–450)
POTASSIUM SERPL-SCNC: 4.3 MMOL/L (ref 3.5–5.2)
PROT SERPL-MCNC: 6.2 G/DL (ref 6–8.5)
RBC # BLD AUTO: 5.16 X10E6/UL (ref 3.77–5.28)
SL AMB EGFR AFRICAN AMERICAN: 113 ML/MIN/1.73
SL AMB EGFR NON AFRICAN AMERICAN: 98 ML/MIN/1.73
SL AMB VLDL CHOLESTEROL CALC: 20 MG/DL (ref 5–40)
SODIUM SERPL-SCNC: 139 MMOL/L (ref 134–144)
TRIGL SERPL-MCNC: 100 MG/DL (ref 0–149)
WBC # BLD AUTO: 10.4 X10E3/UL (ref 3.4–10.8)

## 2019-10-18 PROCEDURE — 90471 IMMUNIZATION ADMIN: CPT | Performed by: FAMILY MEDICINE

## 2019-10-18 PROCEDURE — 3008F BODY MASS INDEX DOCD: CPT | Performed by: FAMILY MEDICINE

## 2019-10-18 PROCEDURE — 90682 RIV4 VACC RECOMBINANT DNA IM: CPT | Performed by: FAMILY MEDICINE

## 2019-10-18 PROCEDURE — 99215 OFFICE O/P EST HI 40 MIN: CPT | Performed by: FAMILY MEDICINE

## 2019-10-18 RX ORDER — VENLAFAXINE HYDROCHLORIDE 75 MG/1
75 TABLET, EXTENDED RELEASE ORAL
Qty: 90 TABLET | Refills: 1 | Status: SHIPPED | OUTPATIENT
Start: 2019-10-18 | End: 2019-11-20

## 2019-10-18 RX ORDER — NYSTATIN 100000 [USP'U]/G
POWDER TOPICAL 4 TIMES DAILY
Qty: 15 G | Refills: 0 | Status: SHIPPED | OUTPATIENT
Start: 2019-10-18 | End: 2020-06-01

## 2019-10-19 NOTE — ASSESSMENT & PLAN NOTE
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

## 2019-10-19 NOTE — ASSESSMENT & PLAN NOTE
Tobacco Cessation Counseling: Tobacco cessation counseling and education was provided  The patient is sincerely urged to quit consumption of tobacco  She is not ready to quit tobacco  The numerous health risks of tobacco consumption were discussed  If she decides to quit, there are a number of helpful adjunctive aids, and she can see me to discuss nicotine replacement therapy, chantix, or bupropion anytime in the future

## 2019-10-19 NOTE — ASSESSMENT & PLAN NOTE
Lab Results   Component Value Date    HGBA1C 6 7 (H) 10/17/2019       Stable  Doing well  Continue with the same  Continue with dietary control

## 2019-10-19 NOTE — ASSESSMENT & PLAN NOTE
Stable  Heart rate under control  Continue to follow up with Cardiology  She is on anticoagulation and anti arrhythmia  She is also on a rate control agent

## 2019-10-19 NOTE — PROGRESS NOTES
Assessment/Plan:      Type 2 diabetes mellitus, without long-term current use of insulin (HCC)    Lab Results   Component Value Date    HGBA1C 6 7 (H) 10/17/2019       Stable  Doing well  Continue with the same  Continue with dietary control  Tobacco use  Tobacco Cessation Counseling: Tobacco cessation counseling and education was provided  The patient is sincerely urged to quit consumption of tobacco  She is not ready to quit tobacco  The numerous health risks of tobacco consumption were discussed  If she decides to quit, there are a number of helpful adjunctive aids, and she can see me to discuss nicotine replacement therapy, chantix, or bupropion anytime in the future  Moderate persistent asthma without complication  Uncontrolled  Likely cause of increased shortness of breath  Has not been using Breo regularly  Advised regarding this  Use albuterol as needed  Advise regarding smoking cessation  Benign essential hypertension  Stable  Continue with the same  Blood pressure acceptable today  Atrial fibrillation (HCC)  Stable  Heart rate under control  Continue to follow up with Cardiology  She is on anticoagulation and anti arrhythmia  She is also on a rate control agent  Current moderate episode of major depressive disorder without prior episode ([address] )  Currently uncontrolled  This is part due to grief reaction due to significant other recently dying  and added stress as 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  Discussed and agreed restarting Effexor  Start her back on 75 mg once a day  No suicidal ideation or homicidal ideation  Has support from her friends  Not wanting to do any sort of counseling at this point in time  Diagnoses and all orders for this visit:    Moderate persistent asthma without complication    Obstructive sleep apnea    Current moderate episode of major depressive disorder without prior episode ([facility])  -     venlafaxine 75 mg 24 hr tablet;  Take 1 tablet (75 mg total) by mouth daily with breakfast    Atrial fibrillation, unspecified type (HCC)    Chronic low back pain without sciatica, unspecified back pain laterality    Class 3 severe obesity with body mass index (BMI) of 40 0 to 44 9 in adult, unspecified obesity type, unspecified whether serious comorbidity present ([facility] )    Encounter for immunization  -     influenza vaccine, [id number], quadrivalent, recombinant, PF, 0 5 mL, for patients 18 yr+ (FLUBLOK)    Type 2 diabetes mellitus without complication, without long-term current use of insulin ([facility])    Tinea corporis  -     nystatin (MYCOSTATIN) powder; Apply topically 4 (four) times a day for 14 days    Hypercholesterolemia    Other orders  -     apixaban (ELIQUIS) 5 mg; Take 5 mg by mouth 2 (two) times a day        I have spent 40 minutes with Patient  today in which greater than 50% of this time was spent in counseling/coordination of care regarding Risks and benefits of tx options, Intructions for management, Risk factor reductions and Impressions  Subjective:   Chief Complaint   Patient presents with    Follow-up    Shortness of Breath        Patient ID: Jaron Christopher is a 59 y o  female  Patient is here for follow-up of chronic conditions  Since last visit she has had multiple stressors  This includes her significant other dying in the ICU due to invasive bladder cancer  Notes he had multiple complications  Guilty about going through with the surgeries  Reports shortness of breath  She does have moderate to severe asthma  She continues to smoke  She is now up to 3-4 cigarettes a day  She has not been taking her Breo daily  She uses albuterol as needed  Chronic low back pain  Has been doing well  Stable on tramadol twice a day  Diabetes mellitus  Blood sugars have been stable  No episodes of hypoglycemia  Tolerating medication well  Last labs showing good control  Hyperlipidemia    Stable with current medication  No issues  Atrial fibrillation  Doing well  She remains on Tambocor, metoprolol, Eliquis  She has regular follow-up with Cardiology  Hypertension  Stable    Has had tenia corporis  Requesting refills for nystatin  Shortness of Breath   Pertinent negatives include no abdominal pain, chest pain, fever or sore throat  The following portions of the patient's history were reviewed and updated as appropriate: allergies, current medications, past family history, past medical history, past social history, past surgical history and problem list     Review of Systems   Constitutional: Negative  Negative for activity change, appetite change, chills, diaphoresis, fatigue and fever  HENT: Negative for congestion, facial swelling and sore throat  Respiratory: Positive for shortness of breath  Negative for apnea, cough and chest tightness  Cardiovascular: Negative  Negative for chest pain and palpitations  Gastrointestinal: Negative  Negative for abdominal distention, abdominal pain, blood in stool, constipation, diarrhea and nausea  Genitourinary: Negative  Negative for difficulty urinating, dysuria, flank pain and frequency  Objective:  Vitals:    10/18/19 1138   BP: 130/84   Pulse: 90   Temp: (!) 96 9 °F (36 1 °C)   SpO2: 94%   Weight: 117 kg (258 lb 12 8 oz)   Height: 5' 3" (1 6 m)      Physical Exam   Constitutional: She is oriented to person, place, and time  She appears well-developed and well-nourished  HENT:   Head: Normocephalic and atraumatic  Right Ear: External ear normal    Left Ear: External ear normal    Nose: Nose normal    Mouth/Throat: Oropharynx is clear and moist    Eyes: Pupils are equal, round, and reactive to light  Conjunctivae and EOM are normal    Neck: Normal range of motion  Neck supple  No tracheal deviation present  No thyromegaly present  Cardiovascular: Normal rate, regular rhythm and normal heart sounds  No murmur heard    Pulses: Dorsalis pedis pulses are 2+ on the right side, and 2+ on the left side  Posterior tibial pulses are 2+ on the right side, and 2+ on the left side  Pulmonary/Chest: Effort normal and breath sounds normal  No respiratory distress  She has no wheezes  Abdominal: Soft  Bowel sounds are normal    Musculoskeletal: Normal range of motion  Feet:   Right Foot:   Skin Integrity: Negative for ulcer, skin breakdown, erythema, warmth, callus or dry skin  Left Foot:   Skin Integrity: Negative for ulcer, skin breakdown, erythema, warmth, callus or dry skin  Neurological: She is oriented to person, place, and time  Skin: Skin is warm and dry  Patient's shoes and socks removed  Right Foot/Ankle   Right Foot Inspection  Skin Exam: skin normal and skin intact no dry skin, no warmth, no callus, no erythema, no maceration, no abnormal color, no pre-ulcer, no ulcer and no callus                          Toe Exam: ROM and strength within normal limits  Sensory   Vibration: diminished and intact  Proprioception: intact   Monofilament testing: intact  Vascular  Capillary refills: < 3 seconds  The right DP pulse is 2+  The right PT pulse is 2+  Left Foot/Ankle  Left Foot Inspection  Skin Exam: skin normal and skin intactno dry skin, no warmth, no erythema, no maceration, normal color, no pre-ulcer, no ulcer and no callus                         Toe Exam: ROM and strength within normal limits                   Sensory   Vibration: diminished  Proprioception: intact  Monofilament: intact  Vascular  Capillary refills: < 3 seconds  The left DP pulse is 2+  The left PT pulse is 2+  Assign Risk Category:  No deformity present;  No loss of protective sensation;        Risk: 1

## 2019-10-19 NOTE — ASSESSMENT & PLAN NOTE
Uncontrolled  Likely cause of increased shortness of breath  Has not been using Breo regularly  Advised regarding this  Use albuterol as needed  Advise regarding smoking cessation

## 2019-11-01 DIAGNOSIS — G47.00 INSOMNIA, UNSPECIFIED TYPE: ICD-10-CM

## 2019-11-01 DIAGNOSIS — G89.29 NECK PAIN, CHRONIC: ICD-10-CM

## 2019-11-01 DIAGNOSIS — G89.29 CHRONIC LOW BACK PAIN WITHOUT SCIATICA, UNSPECIFIED BACK PAIN LATERALITY: ICD-10-CM

## 2019-11-01 DIAGNOSIS — M54.2 NECK PAIN, CHRONIC: ICD-10-CM

## 2019-11-01 DIAGNOSIS — Z79.899 ENCOUNTER FOR LONG-TERM (CURRENT) DRUG USE: ICD-10-CM

## 2019-11-01 DIAGNOSIS — M54.50 CHRONIC LOW BACK PAIN WITHOUT SCIATICA, UNSPECIFIED BACK PAIN LATERALITY: ICD-10-CM

## 2019-11-01 RX ORDER — TRAZODONE HYDROCHLORIDE 150 MG/1
150 TABLET ORAL
Qty: 30 TABLET | Refills: 0 | Status: SHIPPED | OUTPATIENT
Start: 2019-11-01 | End: 2019-12-23 | Stop reason: SDUPTHER

## 2019-11-01 RX ORDER — LORAZEPAM 0.5 MG/1
TABLET ORAL
Qty: 90 TABLET | Refills: 0 | Status: SHIPPED | OUTPATIENT
Start: 2019-11-01 | End: 2019-12-06 | Stop reason: SDUPTHER

## 2019-11-01 NOTE — TELEPHONE ENCOUNTER
Tramadol refill is too early  Last fill 10/9/19 for a month supply  Please have Dr Annette Carbajal refill when due

## 2019-11-06 RX ORDER — TRAMADOL HYDROCHLORIDE 50 MG/1
TABLET ORAL
Qty: 120 TABLET | Refills: 0 | OUTPATIENT
Start: 2019-11-06

## 2019-11-19 ENCOUNTER — TELEPHONE (OUTPATIENT)
Dept: GASTROENTEROLOGY | Facility: CLINIC | Age: 59
End: 2019-11-19

## 2019-11-19 NOTE — TELEPHONE ENCOUNTER
Left voicemail for pt on cell number; tried listed home number, just dead air, assuming line was disconnected

## 2019-11-20 ENCOUNTER — OFFICE VISIT (OUTPATIENT)
Dept: FAMILY MEDICINE CLINIC | Facility: HOSPITAL | Age: 59
End: 2019-11-20
Payer: COMMERCIAL

## 2019-11-20 VITALS
TEMPERATURE: 96.9 F | WEIGHT: 259.4 LBS | BODY MASS INDEX: 45.96 KG/M2 | OXYGEN SATURATION: 93 % | HEART RATE: 82 BPM | SYSTOLIC BLOOD PRESSURE: 134 MMHG | HEIGHT: 63 IN | DIASTOLIC BLOOD PRESSURE: 78 MMHG

## 2019-11-20 DIAGNOSIS — M54.2 NECK PAIN, CHRONIC: ICD-10-CM

## 2019-11-20 DIAGNOSIS — E66.01 CLASS 3 SEVERE OBESITY DUE TO EXCESS CALORIES WITH SERIOUS COMORBIDITY AND BODY MASS INDEX (BMI) OF 45.0 TO 49.9 IN ADULT (HCC): ICD-10-CM

## 2019-11-20 DIAGNOSIS — F32.1 CURRENT MODERATE EPISODE OF MAJOR DEPRESSIVE DISORDER WITHOUT PRIOR EPISODE (HCC): Primary | ICD-10-CM

## 2019-11-20 DIAGNOSIS — E11.9 TYPE 2 DIABETES MELLITUS WITHOUT COMPLICATION, WITHOUT LONG-TERM CURRENT USE OF INSULIN (HCC): ICD-10-CM

## 2019-11-20 DIAGNOSIS — I10 ESSENTIAL HYPERTENSION: ICD-10-CM

## 2019-11-20 DIAGNOSIS — G89.29 NECK PAIN, CHRONIC: ICD-10-CM

## 2019-11-20 DIAGNOSIS — I48.91 ATRIAL FIBRILLATION, UNSPECIFIED TYPE (HCC): ICD-10-CM

## 2019-11-20 DIAGNOSIS — J45.50 SEVERE PERSISTENT ASTHMA WITHOUT COMPLICATION: ICD-10-CM

## 2019-11-20 DIAGNOSIS — M54.50 CHRONIC LOW BACK PAIN WITHOUT SCIATICA, UNSPECIFIED BACK PAIN LATERALITY: ICD-10-CM

## 2019-11-20 DIAGNOSIS — G89.29 CHRONIC LOW BACK PAIN WITHOUT SCIATICA, UNSPECIFIED BACK PAIN LATERALITY: ICD-10-CM

## 2019-11-20 DIAGNOSIS — F17.210 CIGARETTE NICOTINE DEPENDENCE WITHOUT COMPLICATION: ICD-10-CM

## 2019-11-20 DIAGNOSIS — Z01.419 ENCOUNTER FOR ANNUAL ROUTINE GYNECOLOGICAL EXAMINATION: ICD-10-CM

## 2019-11-20 DIAGNOSIS — T78.40XA ALLERGIC REACTION, INITIAL ENCOUNTER: ICD-10-CM

## 2019-11-20 DIAGNOSIS — I10 BENIGN ESSENTIAL HYPERTENSION: ICD-10-CM

## 2019-11-20 PROBLEM — E11.65 DIABETES MELLITUS WITH HYPERGLYCEMIA (HCC): Status: RESOLVED | Noted: 2017-06-23 | Resolved: 2019-11-20

## 2019-11-20 PROCEDURE — 4004F PT TOBACCO SCREEN RCVD TLK: CPT | Performed by: FAMILY MEDICINE

## 2019-11-20 PROCEDURE — 99214 OFFICE O/P EST MOD 30 MIN: CPT | Performed by: FAMILY MEDICINE

## 2019-11-20 RX ORDER — FLUTICASONE FUROATE AND VILANTEROL 100; 25 UG/1; UG/1
1 POWDER RESPIRATORY (INHALATION) DAILY
Qty: 3 INHALER | Refills: 3 | Status: SHIPPED | OUTPATIENT
Start: 2019-11-20 | End: 2019-11-20 | Stop reason: SDUPTHER

## 2019-11-20 RX ORDER — METOPROLOL SUCCINATE 50 MG/1
50 TABLET, EXTENDED RELEASE ORAL DAILY
Qty: 90 TABLET | Refills: 3 | Status: SHIPPED | OUTPATIENT
Start: 2019-11-20 | End: 2020-03-06 | Stop reason: SDUPTHER

## 2019-11-20 RX ORDER — HYDROXYZINE HYDROCHLORIDE 25 MG/1
25 TABLET, FILM COATED ORAL EVERY 6 HOURS PRN
Qty: 30 TABLET | Refills: 0 | Status: SHIPPED | OUTPATIENT
Start: 2019-11-20 | End: 2020-05-21

## 2019-11-20 RX ORDER — TRAMADOL HYDROCHLORIDE 50 MG/1
TABLET ORAL
Qty: 120 TABLET | Refills: 0 | Status: SHIPPED | OUTPATIENT
Start: 2019-11-20 | End: 2020-01-29 | Stop reason: SDUPTHER

## 2019-11-20 RX ORDER — TRAMADOL HYDROCHLORIDE 50 MG/1
TABLET ORAL
Qty: 120 TABLET | Refills: 0 | Status: CANCELLED | OUTPATIENT
Start: 2019-11-20

## 2019-11-20 RX ORDER — FLUTICASONE FUROATE AND VILANTEROL 100; 25 UG/1; UG/1
1 POWDER RESPIRATORY (INHALATION) DAILY
Qty: 3 INHALER | Refills: 3 | Status: CANCELLED | OUTPATIENT
Start: 2019-11-20

## 2019-11-20 RX ORDER — FLUTICASONE FUROATE AND VILANTEROL 100; 25 UG/1; UG/1
1 POWDER RESPIRATORY (INHALATION) DAILY
Qty: 3 INHALER | Refills: 3 | Status: SHIPPED | OUTPATIENT
Start: 2019-11-20 | End: 2020-02-24

## 2019-11-20 RX ORDER — DULOXETIN HYDROCHLORIDE 30 MG/1
30 CAPSULE, DELAYED RELEASE ORAL DAILY
Qty: 30 CAPSULE | Refills: 1 | Status: SHIPPED | OUTPATIENT
Start: 2019-11-20 | End: 2020-01-27 | Stop reason: SDUPTHER

## 2019-11-20 NOTE — PROGRESS NOTES
Assessment/Plan:      Problem List Items Addressed This Visit        Other    Chronic low back pain    Neck pain, chronic      Other Visit Diagnoses     Encounter for annual routine gynecological examination    -  Primary    Relevant Orders    Ambulatory referral to Gynecology    Severe persistent asthma with acute exacerbation        Class 3 severe obesity due to excess calories with serious comorbidity and body mass index (BMI) of 45 0 to 49 9 in adult ([facility] [address] )           1  Major depressive disorder  No suicidal ideation or homicidal ideation  Unfortunately she had a allergic reaction with Effexor  Will stop the Effexor  Trying to avoid prednisone we will try hydroxyzine 25 mg every 6 hours as needed  Will do a Cymbalta trial   30 mg once a day  Will follow up in 4 weeks  Not wanting to do counseling at this time  2  Asthma  Stable  Doing okay  Chronic neck pain  Stable  Continues on regular use of tramadol  PD MP was reviewed  No red flags  Diabetes mellitus  This is stable  BMI Counseling: Body mass index is 45 95 kg/m²  The BMI is above normal  Nutrition recommendations include decreasing portion sizes, encouraging healthy choices of fruits and vegetables, decreasing fast food intake, consuming healthier snacks, moderation in carbohydrate intake, increasing intake of lean protein and reducing intake of saturated and trans fat  Exercise recommendations include moderate physical activity 150 minutes/week and exercising 3-5 times per week  Tobacco Cessation Counseling: Tobacco cessation counseling was provided  The patient is sincerely urged to quit consumption of tobacco  She is not ready to quit tobacco  Medication options and side effects of medication discussed  Patient refused medication            Subjective:   Chief Complaint   Patient presents with    Follow-up     4 week     Itching     Thinks it may be related to the venlafaxine         Patient ID: Silviawendie Ndiaye Patrice Hernandez is a 59 y o  female  Patient is here for follow-up of major depression  Also to review some of her chronic conditions  Since the last visit she was restarted on Effexor  Noting severe itchiness  This is all over her body  Mostly in the groin areas  Has not been using any over-the-counter medications  Not using Benadryl  Needing something to help with severe itchiness  Does not want to be on any prednisone  Reports she has not been on Cymbalta in the past   Medications  She denies any suicidal or homicidal ideation  She has chronic neck pain  She has low back pain  She continues to use tramadol regularly  She has not had any red flags  Has not needed any increase in in medications  Diabetes mellitus  Has been stable  Doing well  Asthma  Stable  Doing well with her current regimen  The following portions of the patient's history were reviewed and updated as appropriate: allergies, current medications, past family history, past medical history, past social history, past surgical history and problem list     Review of Systems   Constitutional: Negative  Negative for activity change, appetite change, chills, diaphoresis, fatigue and fever  HENT: Negative for congestion, facial swelling and sore throat  Respiratory: Negative  Negative for apnea, cough, chest tightness and shortness of breath  Cardiovascular: Negative  Negative for chest pain and palpitations  Gastrointestinal: Negative  Negative for abdominal distention, abdominal pain, blood in stool, constipation, diarrhea and nausea  Genitourinary: Negative  Negative for difficulty urinating, dysuria, flank pain and frequency           Objective:  Vitals:    11/20/19 1457   BP: 134/78   Pulse: 82   Temp: (!) 96 9 °F (36 1 °C)   SpO2: 93%   Weight: 118 kg (259 lb 6 4 oz)   Height: 5' 3" (1 6 m)     BP Readings from Last 3 Encounters:   11/20/19 134/78   10/18/19 130/84   08/05/19 124/80      Wt Readings from Last 3 Encounters:   11/20/19 118 kg (259 lb 6 4 oz)   10/18/19 117 kg (258 lb 12 8 oz)   08/05/19 121 kg (266 lb 12 8 oz)        Telephone on 08/14/2019   Component Date Value Ref Range Status    White Blood Cell Count 10/17/2019 10 4  3 4 - 10 8 x10E3/uL Final    Red Blood Cell Count 10/17/2019 5 16  3 77 - 5 28 x10E6/uL Final    Hemoglobin 10/17/2019 11 8  11 1 - 15 9 g/dL Final    HCT 10/17/2019 37 8  34 0 - 46 6 % Final    MCV 10/17/2019 73* 79 - 97 fL Final    MCH 10/17/2019 22 9* 26 6 - 33 0 pg Final    MCHC 10/17/2019 31 2* 31 5 - 35 7 g/dL Final    RDW 10/17/2019 17 1* 12 3 - 15 4 % Final    Platelet Count 37/49/6006 289  150 - 450 x10E3/uL Final    Glucose, Random 10/17/2019 122* 65 - 99 mg/dL Final    BUN 10/17/2019 7* 8 - 27 mg/dL Final    Creatinine 10/17/2019 0 63  0 57 - 1 00 mg/dL Final    eGFR Non  10/17/2019 98  >59 mL/min/1 73 Final    eGFR  10/17/2019 113  >59 mL/min/1 73 Final    SL AMB BUN/CREATININE RATIO 10/17/2019 11* 12 - 28 Final    Sodium 10/17/2019 139  134 - 144 mmol/L Final    Potassium 10/17/2019 4 3  3 5 - 5 2 mmol/L Final    Chloride 10/17/2019 97  96 - 106 mmol/L Final    CO2 10/17/2019 27  20 - 29 mmol/L Final    CALCIUM 10/17/2019 9 0  8 7 - 10 3 mg/dL Final    Protein, Total 10/17/2019 6 2  6 0 - 8 5 g/dL Final    Albumin 10/17/2019 4 1  3 6 - 4 8 g/dL Final    Globulin, Total 10/17/2019 2 1  1 5 - 4 5 g/dL Final    Albumin/Globulin Ratio 10/17/2019 2 0  1 2 - 2 2 Final    TOTAL BILIRUBIN 10/17/2019 0 2  0 0 - 1 2 mg/dL Final    Alk Phos Isoenzymes 10/17/2019 123* 39 - 117 IU/L Final    AST 10/17/2019 25  0 - 40 IU/L Final    ALT 10/17/2019 35* 0 - 32 IU/L Final    Hemoglobin A1C 10/17/2019 6 7* 4 8 - 5 6 % Final    Comment:          Prediabetes: 5 7 - 6 4           Diabetes: >6 4           Glycemic control for adults with diabetes: <7 0      Estimated Average Glucose 10/17/2019 146  mg/dL Final    Cholesterol, Total 10/17/2019 139  100 - 199 mg/dL Final    Triglycerides 10/17/2019 100  0 - 149 mg/dL Final    HDL 10/17/2019 62  >39 mg/dL Final    VLDL Cholesterol Calculated 10/17/2019 20  5 - 40 mg/dL Final    LDL Direct 10/17/2019 57  0 - 99 mg/dL Final    LDl/HDL Ratio 10/17/2019 0 9  0 0 - 3 2 ratio Final    Comment:                                     LDL/HDL Ratio                                              Men  Women                                1/2 Avg  Risk  1 0    1 5                                    Avg Risk  3 6    3 2                                 2X Avg  Risk  6 2    5 0                                 3X Avg  Risk  8 0    6 1      Iron, Serum 10/17/2019 19* 27 - 159 ug/dL Final   Office Visit on 08/05/2019   Component Date Value Ref Range Status    Atopobium vaginae 08/05/2019 Low - 0  Score Final    BVAB 2 08/05/2019 Low - 0  Score Final    Megasphaera 1 08/05/2019 Low - 0  Score Final    Comment: Calculate total score by adding the 3 individual bacterial  vaginosis (BV) marker scores together  Total score is  interpreted as follows: Total score 0-1: Indicates the absence of BV  Total score   2: Indeterminate for BV  Additional clinical                   data should be evaluated to establish a                   diagnosis  Total score 3-6: Indicates the presence of BV  This test was developed and its performance characteristics  determined by [employer]   It has not been cleared or approved  by the Food and Drug Administration  The FDA has determined  that such clearance or approval is not necessary   Candida albicans, SANGEETA 08/05/2019 Negative  Negative Final    Aide glabrata, SANGEETA 08/05/2019 Negative  Negative Final    Comment: This test was developed and its performance characteristics determined  by [employer]   It has not been cleared or approved by the Food and Drug  Administration  The FDA has determined that such clearance or  approval is not necessary        Trichomonas vaginosis 08/05/2019 Negative  Negative Final    Chlamydia trachomatis, SANGEETA 08/05/2019 Negative  Negative Final    Neisseria gonorrhoeae, SANGEETA 08/05/2019 Negative  Negative Final   ]       Physical Exam   Constitutional: She is oriented to person, place, and time  She appears well-developed and well-nourished  HENT:   Head: Normocephalic and atraumatic  Right Ear: External ear normal    Left Ear: External ear normal    Nose: Nose normal    Mouth/Throat: Oropharynx is clear and moist    Eyes: Pupils are equal, round, and reactive to light  Conjunctivae and EOM are normal    Neck: Normal range of motion  Neck supple  No tracheal deviation present  No thyromegaly present  Cardiovascular: Normal rate, regular rhythm and normal heart sounds  No murmur heard  Pulmonary/Chest: Effort normal and breath sounds normal  No respiratory distress  She has no wheezes  Abdominal: Soft  Bowel sounds are normal    Musculoskeletal: Normal range of motion  Neurological: She is oriented to person, place, and time  Skin: Skin is warm and dry  Psychiatric: Her speech is normal and behavior is normal  Judgment normal  She is not actively hallucinating  Cognition and memory are normal  She exhibits a depressed mood  She is attentive

## 2019-11-21 ENCOUNTER — TELEPHONE (OUTPATIENT)
Dept: FAMILY MEDICINE CLINIC | Facility: HOSPITAL | Age: 59
End: 2019-11-21

## 2019-11-23 NOTE — TELEPHONE ENCOUNTER
Please call patient  breo not covered  Likely need to try generic Advair  Does she want to me to sent that in

## 2019-11-25 DIAGNOSIS — J45.50 SEVERE PERSISTENT ASTHMA WITHOUT COMPLICATION: Primary | ICD-10-CM

## 2019-11-25 NOTE — TELEPHONE ENCOUNTER
As was said , [facility]  To also just consider going to pain mgt

## 2019-11-25 NOTE — TELEPHONE ENCOUNTER
Can you send Generic Advair? Also - Tramadol was denied d/t the dose not being titrated down since last approval  They need clinical explanation as to why    I will submit new PA

## 2019-12-06 DIAGNOSIS — Z79.899 ENCOUNTER FOR LONG-TERM (CURRENT) DRUG USE: ICD-10-CM

## 2019-12-06 RX ORDER — LORAZEPAM 0.5 MG/1
TABLET ORAL
Qty: 90 TABLET | Refills: 0 | Status: SHIPPED | OUTPATIENT
Start: 2019-12-06 | End: 2020-01-06 | Stop reason: SDUPTHER

## 2019-12-23 DIAGNOSIS — G47.00 INSOMNIA, UNSPECIFIED TYPE: ICD-10-CM

## 2019-12-23 RX ORDER — TRAZODONE HYDROCHLORIDE 150 MG/1
150 TABLET ORAL
Qty: 30 TABLET | Refills: 0 | Status: SHIPPED | OUTPATIENT
Start: 2019-12-23 | End: 2020-01-29 | Stop reason: SDUPTHER

## 2020-01-06 DIAGNOSIS — Z79.899 ENCOUNTER FOR LONG-TERM (CURRENT) DRUG USE: ICD-10-CM

## 2020-01-06 DIAGNOSIS — E11.65 TYPE 2 DIABETES MELLITUS WITH HYPERGLYCEMIA, WITHOUT LONG-TERM CURRENT USE OF INSULIN (HCC): ICD-10-CM

## 2020-01-06 DIAGNOSIS — I50.31 ACUTE DIASTOLIC CONGESTIVE HEART FAILURE (HCC): ICD-10-CM

## 2020-01-08 DIAGNOSIS — Z79.899 ENCOUNTER FOR LONG-TERM (CURRENT) DRUG USE: ICD-10-CM

## 2020-01-08 RX ORDER — LORAZEPAM 0.5 MG/1
TABLET ORAL
Qty: 90 TABLET | Refills: 0 | Status: SHIPPED | OUTPATIENT
Start: 2020-01-08 | End: 2020-01-29 | Stop reason: SDUPTHER

## 2020-01-08 RX ORDER — GLIMEPIRIDE 4 MG/1
4 TABLET ORAL
Qty: 30 TABLET | Refills: 5 | Status: SHIPPED | OUTPATIENT
Start: 2020-01-08 | End: 2020-06-01

## 2020-01-08 RX ORDER — LORAZEPAM 0.5 MG/1
TABLET ORAL
Qty: 90 TABLET | Refills: 0 | OUTPATIENT
Start: 2020-01-08

## 2020-01-08 RX ORDER — FUROSEMIDE 40 MG/1
40 TABLET ORAL 2 TIMES DAILY
Qty: 60 TABLET | Refills: 5 | Status: SHIPPED | OUTPATIENT
Start: 2020-01-08 | End: 2020-08-20 | Stop reason: SDUPTHER

## 2020-01-23 ENCOUNTER — OFFICE VISIT (OUTPATIENT)
Dept: FAMILY MEDICINE CLINIC | Facility: HOSPITAL | Age: 60
End: 2020-01-23
Payer: COMMERCIAL

## 2020-01-23 VITALS
DIASTOLIC BLOOD PRESSURE: 80 MMHG | BODY MASS INDEX: 45.79 KG/M2 | TEMPERATURE: 97.3 F | SYSTOLIC BLOOD PRESSURE: 132 MMHG | WEIGHT: 258.4 LBS | OXYGEN SATURATION: 94 % | HEART RATE: 90 BPM | HEIGHT: 63 IN

## 2020-01-23 DIAGNOSIS — B35.6 TINEA INGUINALIS: Primary | ICD-10-CM

## 2020-01-23 DIAGNOSIS — E11.9 TYPE 2 DIABETES MELLITUS WITHOUT COMPLICATION, WITHOUT LONG-TERM CURRENT USE OF INSULIN (HCC): ICD-10-CM

## 2020-01-23 DIAGNOSIS — F32.1 CURRENT MODERATE EPISODE OF MAJOR DEPRESSIVE DISORDER WITHOUT PRIOR EPISODE (HCC): ICD-10-CM

## 2020-01-23 DIAGNOSIS — B35.4 TINEA CORPORIS: ICD-10-CM

## 2020-01-23 LAB — SL AMB POCT HEMOGLOBIN AIC: 6.5 (ref ?–6.5)

## 2020-01-23 PROCEDURE — 3044F HG A1C LEVEL LT 7.0%: CPT | Performed by: NURSE PRACTITIONER

## 2020-01-23 PROCEDURE — 99214 OFFICE O/P EST MOD 30 MIN: CPT | Performed by: FAMILY MEDICINE

## 2020-01-23 PROCEDURE — 4004F PT TOBACCO SCREEN RCVD TLK: CPT | Performed by: FAMILY MEDICINE

## 2020-01-23 PROCEDURE — 3008F BODY MASS INDEX DOCD: CPT | Performed by: FAMILY MEDICINE

## 2020-01-23 PROCEDURE — 3044F HG A1C LEVEL LT 7.0%: CPT | Performed by: FAMILY MEDICINE

## 2020-01-23 PROCEDURE — 83036 HEMOGLOBIN GLYCOSYLATED A1C: CPT | Performed by: FAMILY MEDICINE

## 2020-01-23 RX ORDER — PRENATAL VIT 91/IRON/FOLIC/DHA 28-975-200
COMBINATION PACKAGE (EA) ORAL 2 TIMES DAILY
Qty: 30 G | Refills: 1 | Status: SHIPPED | OUTPATIENT
Start: 2020-01-23 | End: 2020-05-21

## 2020-01-23 RX ORDER — MOMETASONE FUROATE 1 MG/G
OINTMENT TOPICAL DAILY
Qty: 15 G | Refills: 0 | Status: SHIPPED | OUTPATIENT
Start: 2020-01-23 | End: 2020-06-01

## 2020-01-23 NOTE — PROGRESS NOTES
Assessment/Plan:      Problem List Items Addressed This Visit        Endocrine    Type 2 diabetes mellitus, without long-term current use of insulin (Former[facility])    Relevant Orders    POCT hemoglobin A1c (Completed)       Other    Current moderate episode of major depressive disorder without prior episode ([facility] [address] )      Other Visit Diagnoses     Tinea inguinalis    -  Primary    Relevant Medications    terbinafine (LamISIL) 1 % cream    mometasone (ELOCON) 0 1 % ointment    Tinea corporis        Relevant Medications    terbinafine (LamISIL) 1 % cream    mometasone (ELOCON) 0 1 % ointment         Patient with fungal infection in the inguinal area and underneath her breast   Will use terbinafine 1% cream   Will use a topical steroid to help with inflammation and itching  She sees the terbinafine cream for 14 days  If not improving will need to consider oral anti fungals  This is in the setting of known diabetes mellitus  This has been stable however  Subjective:   Chief Complaint   Patient presents with    Itching     Groin area         Patient ID: Pablo Cordon is a 62 y o  female  Patient is here for a rash in her groin area  Also rash underneath her right breast   Has been very itchy  Overall she has been doing better since the last visit  She does have a history of major depression  We had switched her to Cymbalta 30 mg daily  She reports she is doing well  She knows she does have diabetes mellitus  In this may be aggravating the rash especially a fungal infection  She reports she has been drinking more sugary tea  But overall has been trying to control her diabetes  Continues on her medications regularly  No episodes of hypoglycemia  Atrial fibrillation  Continues follow-up with Cardiology  Doing well with this  She is on Eliquis, Tambocor, and metoprolol          The following portions of the patient's history were reviewed and updated as appropriate: allergies, current medications, past family history, past medical history, past social history, past surgical history and problem list     Review of Systems   Constitutional: Negative for activity change and appetite change  Gastrointestinal: Negative for abdominal pain, blood in stool, constipation and diarrhea           Objective:  Vitals:    01/23/20 1308   BP: 132/80   Pulse: 90   Temp: (!) 97 3 °F (36 3 °C)   SpO2: 94%   Weight: 117 kg (258 lb 6 4 oz)   Height: 5' 3" (1 6 m)     BP Readings from Last 3 Encounters:   01/23/20 132/80   11/20/19 134/78   10/18/19 130/84      Wt Readings from Last 3 Encounters:   01/23/20 117 kg (258 lb 6 4 oz)   11/20/19 118 kg (259 lb 6 4 oz)   10/18/19 117 kg (258 lb 12 8 oz)        Office Visit on 01/23/2020   Component Date Value Ref Range Status    Hemoglobin A1C 01/23/2020 6 5  6 5 Final   Telephone on 08/14/2019   Component Date Value Ref Range Status    White Blood Cell Count 10/17/2019 10 4  3 4 - 10 8 x10E3/uL Final    Red Blood Cell Count 10/17/2019 5 16  3 77 - 5 28 x10E6/uL Final    Hemoglobin 10/17/2019 11 8  11 1 - 15 9 g/dL Final    HCT 10/17/2019 37 8  34 0 - 46 6 % Final    MCV 10/17/2019 73* 79 - 97 fL Final    MCH 10/17/2019 22 9* 26 6 - 33 0 pg Final    MCHC 10/17/2019 31 2* 31 5 - 35 7 g/dL Final    RDW 10/17/2019 17 1* 12 3 - 15 4 % Final    Platelet Count 08/36/2310 289  150 - 450 x10E3/uL Final    Glucose, Random 10/17/2019 122* 65 - 99 mg/dL Final    BUN 10/17/2019 7* 8 - 27 mg/dL Final    Creatinine 10/17/2019 0 63  0 57 - 1 00 mg/dL Final    eGFR Non  10/17/2019 98  >59 mL/min/1 73 Final    eGFR  10/17/2019 113  >59 mL/min/1 73 Final    SL AMB BUN/CREATININE RATIO 10/17/2019 11* 12 - 28 Final    Sodium 10/17/2019 139  134 - 144 mmol/L Final    Potassium 10/17/2019 4 3  3 5 - 5 2 mmol/L Final    Chloride 10/17/2019 97  96 - 106 mmol/L Final    CO2 10/17/2019 27  20 - 29 mmol/L Final    CALCIUM 10/17/2019 9 0  8 7 - 10 3 mg/dL Final    Protein, Total 10/17/2019 6 2  6 0 - 8 5 g/dL Final    Albumin 10/17/2019 4 1  3 6 - 4 8 g/dL Final    Globulin, Total 10/17/2019 2 1  1 5 - 4 5 g/dL Final    Albumin/Globulin Ratio 10/17/2019 2 0  1 2 - 2 2 Final    TOTAL BILIRUBIN 10/17/2019 0 2  0 0 - 1 2 mg/dL Final    Alk Phos Isoenzymes 10/17/2019 123* 39 - 117 IU/L Final    AST 10/17/2019 25  0 - 40 IU/L Final    ALT 10/17/2019 35* 0 - 32 IU/L Final    Hemoglobin A1C 10/17/2019 6 7* 4 8 - 5 6 % Final    Comment:          Prediabetes: 5 7 - 6 4           Diabetes: >6 4           Glycemic control for adults with diabetes: <7 0      Estimated Average Glucose 10/17/2019 146  mg/dL Final    Cholesterol, Total 10/17/2019 139  100 - 199 mg/dL Final    Triglycerides 10/17/2019 100  0 - 149 mg/dL Final    HDL 10/17/2019 62  >39 mg/dL Final    VLDL Cholesterol Calculated 10/17/2019 20  5 - 40 mg/dL Final    LDL Direct 10/17/2019 57  0 - 99 mg/dL Final    LDl/HDL Ratio 10/17/2019 0 9  0 0 - 3 2 ratio Final    Comment:                                     LDL/HDL Ratio                                              Men  Women                                1/2 Avg  Risk  1 0    1 5                                    Avg Risk  3 6    3 2                                 2X Avg  Risk  6 2    5 0                                 3X Avg  Risk  8 0    6 1      Iron, Serum 10/17/2019 19* 27 - 159 ug/dL Final   Office Visit on 08/05/2019   Component Date Value Ref Range Status    Atopobium vaginae 08/05/2019 Low - 0  Score Final    BVAB 2 08/05/2019 Low - 0  Score Final    Megasphaera 1 08/05/2019 Low - 0  Score Final    Comment: Calculate total score by adding the 3 individual bacterial  vaginosis (BV) marker scores together  Total score is  interpreted as follows: Total score 0-1: Indicates the absence of BV  Total score   2: Indeterminate for BV   Additional clinical                   data should be evaluated to establish a diagnosis  Total score 3-6: Indicates the presence of BV  This test was developed and its performance characteristics  determined by [facility]   It has not been cleared or approved  by the Food and Drug Administration  The FDA has determined  that such clearance or approval is not necessary   Candida albicans, SANGEETA 08/05/2019 Negative  Negative Final    Aide glabrata, SANGEETA 08/05/2019 Negative  Negative Final    Comment: This test was developed and its performance characteristics determined  by [facility]   It has not been cleared or approved by the Food and Drug  Administration  The FDA has determined that such clearance or  approval is not necessary   Trichomonas vaginosis 08/05/2019 Negative  Negative Final    Chlamydia trachomatis, SANGEETA 08/05/2019 Negative  Negative Final    Neisseria gonorrhoeae, SANGEETA 08/05/2019 Negative  Negative Final   ]       Physical Exam   Constitutional: She appears well-developed and well-nourished  HENT:   Head: Normocephalic  Cardiovascular: Normal rate, regular rhythm and normal heart sounds  Pulmonary/Chest: Breath sounds normal    Skin:   Left inguinal area  Erythematous rash  Circular  Central clearing  Elevated borders  Satellite lesions seen  Right inframammary area  Erythematous patch  Multiple satellite lesions

## 2020-01-23 NOTE — ASSESSMENT & PLAN NOTE
Lab Results   Component Value Date    HGBA1C 6 5 01/23/2020   Diabetes is stable  Office A1c is at 6 5  Continue work on dietary control, weight loss, exercise

## 2020-01-23 NOTE — ASSESSMENT & PLAN NOTE
Stable  Continue with the same  Continues on Eliquis 5 mg twice a day  Continues on Toprol XL 50 mg daily  She is on Tambocor 100 mg twice a day  Continue follow-up with Cardiology

## 2020-01-23 NOTE — ASSESSMENT & PLAN NOTE
She is currently doing well on this regimen  She is on Cymbalta 30 mg daily  She is on 150 mg daily at bedtime  Continue the same dosing  No suicidal ideation or homicidal ideation  Continue to monitor symptoms

## 2020-01-27 DIAGNOSIS — M54.50 CHRONIC LOW BACK PAIN WITHOUT SCIATICA, UNSPECIFIED BACK PAIN LATERALITY: ICD-10-CM

## 2020-01-27 DIAGNOSIS — F32.1 CURRENT MODERATE EPISODE OF MAJOR DEPRESSIVE DISORDER WITHOUT PRIOR EPISODE (HCC): ICD-10-CM

## 2020-01-27 DIAGNOSIS — G47.00 INSOMNIA, UNSPECIFIED TYPE: ICD-10-CM

## 2020-01-27 DIAGNOSIS — G89.29 NECK PAIN, CHRONIC: ICD-10-CM

## 2020-01-27 DIAGNOSIS — M54.2 NECK PAIN, CHRONIC: ICD-10-CM

## 2020-01-27 DIAGNOSIS — Z79.899 ENCOUNTER FOR LONG-TERM (CURRENT) DRUG USE: ICD-10-CM

## 2020-01-27 DIAGNOSIS — G89.29 CHRONIC LOW BACK PAIN WITHOUT SCIATICA, UNSPECIFIED BACK PAIN LATERALITY: ICD-10-CM

## 2020-01-27 DIAGNOSIS — J45.909 UNCOMPLICATED ASTHMA, UNSPECIFIED ASTHMA SEVERITY, UNSPECIFIED WHETHER PERSISTENT: ICD-10-CM

## 2020-01-29 RX ORDER — ALBUTEROL SULFATE 90 UG/1
2 AEROSOL, METERED RESPIRATORY (INHALATION) EVERY 4 HOURS PRN
Qty: 1 INHALER | Refills: 0 | Status: SHIPPED | OUTPATIENT
Start: 2020-01-29 | End: 2020-04-30

## 2020-01-29 RX ORDER — DULOXETIN HYDROCHLORIDE 30 MG/1
30 CAPSULE, DELAYED RELEASE ORAL DAILY
Qty: 30 CAPSULE | Refills: 0 | Status: SHIPPED | OUTPATIENT
Start: 2020-01-29 | End: 2020-02-24

## 2020-01-29 RX ORDER — TRAMADOL HYDROCHLORIDE 50 MG/1
TABLET ORAL
Qty: 120 TABLET | Refills: 0 | Status: SHIPPED | OUTPATIENT
Start: 2020-01-29 | End: 2020-04-01

## 2020-01-29 RX ORDER — LORAZEPAM 0.5 MG/1
TABLET ORAL
Qty: 90 TABLET | Refills: 0 | Status: SHIPPED | OUTPATIENT
Start: 2020-01-29 | End: 2020-02-29 | Stop reason: SDUPTHER

## 2020-01-29 RX ORDER — TRAZODONE HYDROCHLORIDE 150 MG/1
150 TABLET ORAL
Qty: 30 TABLET | Refills: 0 | Status: SHIPPED | OUTPATIENT
Start: 2020-01-29 | End: 2020-02-29 | Stop reason: SDUPTHER

## 2020-02-24 ENCOUNTER — OFFICE VISIT (OUTPATIENT)
Dept: FAMILY MEDICINE CLINIC | Facility: HOSPITAL | Age: 60
End: 2020-02-24
Payer: COMMERCIAL

## 2020-02-24 VITALS
BODY MASS INDEX: 46.42 KG/M2 | HEIGHT: 63 IN | TEMPERATURE: 97.3 F | WEIGHT: 262 LBS | OXYGEN SATURATION: 93 % | HEART RATE: 84 BPM | SYSTOLIC BLOOD PRESSURE: 124 MMHG | DIASTOLIC BLOOD PRESSURE: 70 MMHG

## 2020-02-24 DIAGNOSIS — G89.29 CHRONIC LOW BACK PAIN WITHOUT SCIATICA, UNSPECIFIED BACK PAIN LATERALITY: ICD-10-CM

## 2020-02-24 DIAGNOSIS — R14.0 ABDOMINAL DISTENTION: ICD-10-CM

## 2020-02-24 DIAGNOSIS — J45.40 MODERATE PERSISTENT ASTHMA WITHOUT COMPLICATION: Primary | ICD-10-CM

## 2020-02-24 DIAGNOSIS — R35.0 URINARY FREQUENCY: ICD-10-CM

## 2020-02-24 DIAGNOSIS — R10.11 RIGHT UPPER QUADRANT ABDOMINAL PAIN: ICD-10-CM

## 2020-02-24 DIAGNOSIS — F32.1 CURRENT MODERATE EPISODE OF MAJOR DEPRESSIVE DISORDER WITHOUT PRIOR EPISODE (HCC): ICD-10-CM

## 2020-02-24 DIAGNOSIS — K21.9 GASTROESOPHAGEAL REFLUX DISEASE WITHOUT ESOPHAGITIS: ICD-10-CM

## 2020-02-24 DIAGNOSIS — G89.29 NECK PAIN, CHRONIC: ICD-10-CM

## 2020-02-24 DIAGNOSIS — M54.2 NECK PAIN, CHRONIC: ICD-10-CM

## 2020-02-24 DIAGNOSIS — M54.50 CHRONIC LOW BACK PAIN WITHOUT SCIATICA, UNSPECIFIED BACK PAIN LATERALITY: ICD-10-CM

## 2020-02-24 LAB
SL AMB  POCT GLUCOSE, UA: NORMAL
SL AMB LEUKOCYTE ESTERASE,UA: ABNORMAL
SL AMB POCT BILIRUBIN,UA: NEGATIVE
SL AMB POCT BLOOD,UA: 50
SL AMB POCT CLARITY,UA: CLEAR
SL AMB POCT COLOR,UA: YELLOW
SL AMB POCT KETONES,UA: NEGATIVE
SL AMB POCT NITRITE,UA: POSITIVE
SL AMB POCT PH,UA: 8
SL AMB POCT SPECIFIC GRAVITY,UA: 1
SL AMB POCT URINE PROTEIN: 30
SL AMB POCT UROBILINOGEN: NORMAL

## 2020-02-24 PROCEDURE — 4004F PT TOBACCO SCREEN RCVD TLK: CPT | Performed by: FAMILY MEDICINE

## 2020-02-24 PROCEDURE — 81002 URINALYSIS NONAUTO W/O SCOPE: CPT | Performed by: FAMILY MEDICINE

## 2020-02-24 PROCEDURE — 3044F HG A1C LEVEL LT 7.0%: CPT | Performed by: FAMILY MEDICINE

## 2020-02-24 PROCEDURE — 3008F BODY MASS INDEX DOCD: CPT | Performed by: FAMILY MEDICINE

## 2020-02-24 PROCEDURE — 3074F SYST BP LT 130 MM HG: CPT | Performed by: FAMILY MEDICINE

## 2020-02-24 PROCEDURE — 3078F DIAST BP <80 MM HG: CPT | Performed by: FAMILY MEDICINE

## 2020-02-24 PROCEDURE — 99214 OFFICE O/P EST MOD 30 MIN: CPT | Performed by: FAMILY MEDICINE

## 2020-02-24 RX ORDER — BUDESONIDE AND FORMOTEROL FUMARATE DIHYDRATE 160; 4.5 UG/1; UG/1
2 AEROSOL RESPIRATORY (INHALATION) 2 TIMES DAILY
Qty: 1 INHALER | Refills: 0 | Status: SHIPPED | OUTPATIENT
Start: 2020-02-24 | End: 2020-04-06

## 2020-02-24 NOTE — PROGRESS NOTES
Assessment/Plan:      Problem List Items Addressed This Visit        Digestive    Esophageal reflux       Respiratory    Moderate persistent asthma without complication - Primary    Relevant Medications    budesonide-formoterol (SYMBICORT) 160-4 5 mcg/act inhaler       Other    Chronic low back pain    Current moderate episode of major depressive disorder without prior episode (HCC)    Neck pain, chronic      Other Visit Diagnoses     Urinary frequency        Relevant Orders    POCT urine dip (Completed)    Urine culture    Abdominal distention        Relevant Orders    US abdomen complete    Amylase    Lipase    H  pylori antigen, stool    CBC (Includes Diff/Plt) (Refl)    Comprehensive metabolic panel    Urine culture    Right upper quadrant abdominal pain        Relevant Orders    Amylase    Lipase    H  pylori antigen, stool    CBC (Includes Diff/Plt) (Refl)    Comprehensive metabolic panel    Urine culture       urinary frequency  Await culture results  RUQ pain with abdominal distention  With underlying reflux  ocntinue with PPI  Check labs to include ruling out h pylori  Check US abdomen  She is due fo cscope and should probably get EGd as well through GI  Await results  Asthma  Uncontrolled  She is moderate persistent and has not been doing well on V[employer] Croft and she stopped it  Will rx symbicort and see how she does  Will likley need prior auth due to formulary of [employer]      Chronic pain  Neck and low back   Stable on tramadol use  No red flags  Subjective:   Chief Complaint   Patient presents with    Asthma    Urinary Frequency    Abdominal Pain        Patient ID: Delorise Carrel is a 62 y o  female  Here for several things  1  With urinary urgency  No dysuria  With frequency  No hematuria  No fever, no chills  No vaginal bleeding  2  Asthma  Increase sob and intermittent wheezing  Using abluterol more often     Has not been using wixela 250/50 as has not been effective  Did do well on breo but not on formulary  Has not been on symbicort recently  Willing to try if this is convered  No sputum production, no fever,   No URI sytmpoms  3  Has been having several days of abdominal pain  momstly RUQ but radiates down to the peribumbilical area  Does have reflux  Has had some dietary indiscretions  Concerned about ulcers  No consitpation, no diarrhea  Feels some abdominal distention  4  Chronic pain  Controlled with current regimen  Pain controlled with tramadol  The following portions of the patient's history were reviewed and updated as appropriate: allergies, current medications, past family history, past medical history, past social history, past surgical history and problem list     Review of Systems   Constitutional: Positive for activity change and appetite change  Negative for fever and unexpected weight change  HENT: Negative for rhinorrhea, sinus pressure and sinus pain  Respiratory: Positive for cough, shortness of breath and wheezing  Cardiovascular: Negative for chest pain, palpitations and leg swelling  Gastrointestinal: Positive for abdominal distention and abdominal pain  Negative for constipation, nausea and vomiting  Genitourinary: Positive for urgency  Negative for decreased urine volume, dysuria, hematuria, pelvic pain, vaginal bleeding, vaginal discharge and vaginal pain           Objective:  Vitals:    02/24/20 1103   BP: 124/70   Pulse: 84   Temp: (!) 97 3 °F (36 3 °C)   SpO2: 93%   Weight: 119 kg (262 lb)   Height: 5' 3" (1 6 m)     BP Readings from Last 3 Encounters:   02/24/20 124/70   01/23/20 132/80   11/20/19 134/78      Wt Readings from Last 3 Encounters:   02/24/20 119 kg (262 lb)   01/23/20 117 kg (258 lb 6 4 oz)   11/20/19 118 kg (259 lb 6 4 oz)        Office Visit on 02/24/2020   Component Date Value Ref Range Status    LEUKOCYTE ESTERASE,UA 02/24/2020 Trace   Final    Ishaan Rueda 02/24/2020 Positive   Final    SL AMB POCT UROBILINOGEN 02/24/2020 Normal   Final    POCT URINE PROTEIN 02/24/2020 30   Final     PH,UA 02/24/2020 8   Final    BLOOD,UA 02/24/2020 50   Final    SPECIFIC GRAVITY,UA 02/24/2020 1 005   Final    KETONES,UA 02/24/2020 Negative   Final    BILIRUBIN,UA 02/24/2020 Negative   Final    GLUCOSE, UA 02/24/2020 Normal   Final     COLOR,UA 02/24/2020 Yellow   Final    CLARITY,UA 02/24/2020 Clear   Final   Office Visit on 01/23/2020   Component Date Value Ref Range Status    Hemoglobin A1C 01/23/2020 6 5  6 5 Final   ]       Physical Exam   Constitutional: She is oriented to person, place, and time  She appears well-developed and well-nourished  HENT:   Head: Normocephalic and atraumatic  Right Ear: External ear normal    Left Ear: External ear normal    Nose: Nose normal    Mouth/Throat: Oropharynx is clear and moist    Eyes: Pupils are equal, round, and reactive to light  Conjunctivae and EOM are normal    Neck: Normal range of motion  Neck supple  No tracheal deviation present  No thyromegaly present  Cardiovascular: Normal rate, regular rhythm and normal heart sounds  No murmur heard  Pulmonary/Chest: Effort normal and breath sounds normal  No respiratory distress  She has no wheezes  Abdominal: Soft  Bowel sounds are normal  She exhibits distension  She exhibits no shifting dullness, no pulsatile liver, no fluid wave, no abdominal bruit, no ascites, no pulsatile midline mass and no mass  There is no hepatosplenomegaly, splenomegaly or hepatomegaly  There is no tenderness  There is no rebound and no CVA tenderness  Musculoskeletal: Normal range of motion  Neurological: She is oriented to person, place, and time  Skin: Skin is warm and dry  Nursing note and vitals reviewed

## 2020-02-27 LAB
BACTERIA UR CULT: ABNORMAL
Lab: ABNORMAL
SL AMB ANTIMICROBIAL SUSCEPTIBILITY: ABNORMAL

## 2020-02-29 DIAGNOSIS — N30.00 ACUTE CYSTITIS WITHOUT HEMATURIA: Primary | ICD-10-CM

## 2020-02-29 DIAGNOSIS — Z79.899 ENCOUNTER FOR LONG-TERM (CURRENT) DRUG USE: ICD-10-CM

## 2020-02-29 DIAGNOSIS — G47.00 INSOMNIA, UNSPECIFIED TYPE: ICD-10-CM

## 2020-02-29 RX ORDER — SULFAMETHOXAZOLE AND TRIMETHOPRIM 800; 160 MG/1; MG/1
1 TABLET ORAL EVERY 12 HOURS SCHEDULED
Qty: 14 TABLET | Refills: 0 | Status: SHIPPED | OUTPATIENT
Start: 2020-02-29 | End: 2020-03-07

## 2020-02-29 RX ORDER — LORAZEPAM 0.5 MG/1
TABLET ORAL
Qty: 90 TABLET | Refills: 0 | Status: SHIPPED | OUTPATIENT
Start: 2020-02-29 | End: 2020-04-01

## 2020-02-29 RX ORDER — FLUCONAZOLE 150 MG/1
150 TABLET ORAL ONCE
Qty: 1 TABLET | Refills: 0 | Status: SHIPPED | OUTPATIENT
Start: 2020-02-29 | End: 2020-02-29

## 2020-02-29 RX ORDER — TRAZODONE HYDROCHLORIDE 150 MG/1
150 TABLET ORAL
Qty: 30 TABLET | Refills: 0 | Status: SHIPPED | OUTPATIENT
Start: 2020-02-29 | End: 2020-04-01

## 2020-03-06 DIAGNOSIS — I48.91 ATRIAL FIBRILLATION, UNSPECIFIED TYPE (HCC): ICD-10-CM

## 2020-03-06 DIAGNOSIS — I50.31 ACUTE DIASTOLIC CONGESTIVE HEART FAILURE (HCC): ICD-10-CM

## 2020-03-06 DIAGNOSIS — I10 ESSENTIAL HYPERTENSION: ICD-10-CM

## 2020-03-06 RX ORDER — POTASSIUM CHLORIDE 20 MEQ/1
20 TABLET, EXTENDED RELEASE ORAL 2 TIMES DAILY
Qty: 180 TABLET | Refills: 1 | Status: SHIPPED | OUTPATIENT
Start: 2020-03-06 | End: 2020-08-20 | Stop reason: SDUPTHER

## 2020-03-06 RX ORDER — METOPROLOL SUCCINATE 50 MG/1
50 TABLET, EXTENDED RELEASE ORAL DAILY
Qty: 90 TABLET | Refills: 1 | Status: SHIPPED | OUTPATIENT
Start: 2020-03-06 | End: 2020-08-20 | Stop reason: SDUPTHER

## 2020-03-06 RX ORDER — FLECAINIDE ACETATE 100 MG/1
100 TABLET ORAL EVERY 12 HOURS SCHEDULED
Qty: 180 TABLET | Refills: 1 | Status: SHIPPED | OUTPATIENT
Start: 2020-03-06 | End: 2020-08-20 | Stop reason: SDUPTHER

## 2020-03-09 ENCOUNTER — HOSPITAL ENCOUNTER (OUTPATIENT)
Dept: ULTRASOUND IMAGING | Facility: HOSPITAL | Age: 60
Discharge: HOME | End: 2020-03-09
Payer: COMMERCIAL

## 2020-03-09 DIAGNOSIS — R14.0 ABDOMINAL DISTENTION: ICD-10-CM

## 2020-03-09 LAB
ALBUMIN SERPL-MCNC: 4.4 G/DL (ref 3.8–4.8)
ALBUMIN/GLOB SERPL: 2.1 {RATIO} (ref 1.2–2.2)
ALP SERPL-CCNC: 115 IU/L (ref 39–117)
ALT SERPL-CCNC: 52 IU/L (ref 0–32)
AMYLASE SERPL-CCNC: 31 U/L (ref 31–110)
AST SERPL-CCNC: 55 IU/L (ref 0–40)
BILIRUB SERPL-MCNC: 0.3 MG/DL (ref 0–1.2)
BUN SERPL-MCNC: 5 MG/DL (ref 8–27)
BUN/CREAT SERPL: 9 (ref 12–28)
CALCIUM SERPL-MCNC: 8.9 MG/DL (ref 8.7–10.3)
CHLORIDE SERPL-SCNC: 89 MMOL/L (ref 96–106)
CO2 SERPL-SCNC: 28 MMOL/L (ref 20–29)
CREAT SERPL-MCNC: 0.56 MG/DL (ref 0.57–1)
GLOBULIN SER-MCNC: 2.1 G/DL (ref 1.5–4.5)
GLUCOSE SERPL-MCNC: 147 MG/DL (ref 65–99)
LIPASE SERPL-CCNC: 11 U/L (ref 14–72)
POTASSIUM SERPL-SCNC: 4.5 MMOL/L (ref 3.5–5.2)
PROT SERPL-MCNC: 6.5 G/DL (ref 6–8.5)
SL AMB EGFR AFRICAN AMERICAN: 116 ML/MIN/1.73
SL AMB EGFR NON AFRICAN AMERICAN: 101 ML/MIN/1.73
SODIUM SERPL-SCNC: 134 MMOL/L (ref 134–144)

## 2020-03-09 PROCEDURE — 76700 US EXAM ABDOM COMPLETE: CPT

## 2020-03-12 ENCOUNTER — APPOINTMENT (EMERGENCY)
Dept: CT IMAGING | Facility: HOSPITAL | Age: 60
End: 2020-03-12
Payer: COMMERCIAL

## 2020-03-12 ENCOUNTER — HOSPITAL ENCOUNTER (EMERGENCY)
Facility: HOSPITAL | Age: 60
Discharge: HOME | End: 2020-03-12
Attending: EMERGENCY MEDICINE | Admitting: EMERGENCY MEDICINE
Payer: COMMERCIAL

## 2020-03-12 VITALS
HEART RATE: 82 BPM | BODY MASS INDEX: 46.48 KG/M2 | SYSTOLIC BLOOD PRESSURE: 153 MMHG | DIASTOLIC BLOOD PRESSURE: 73 MMHG | HEIGHT: 63 IN | RESPIRATION RATE: 21 BRPM | TEMPERATURE: 98 F | WEIGHT: 262.35 LBS | OXYGEN SATURATION: 91 %

## 2020-03-12 DIAGNOSIS — R10.9 ABDOMINAL PAIN: ICD-10-CM

## 2020-03-12 DIAGNOSIS — S20.211A CONTUSION OF RIB ON RIGHT SIDE: ICD-10-CM
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
## 2020-03-12 DIAGNOSIS — K29.70 GASTRITIS: ICD-10-CM

## 2020-03-12 LAB
ALBUMIN SERPL BCP-MCNC: 3.3 G/DL (ref 3.5–5)
ALP SERPL-CCNC: 132 U/L (ref 46–116)
ALT SERPL W P-5'-P-CCNC: 45 U/L (ref 12–78)
ANION GAP SERPL CALCULATED.3IONS-SCNC: 6 MMOL/L (ref 4–13)
APTT PPP: 32 SECONDS (ref 23–37)
AST SERPL W P-5'-P-CCNC: 21 U/L (ref 5–45)
ATRIAL RATE: 84 BPM
BASOPHILS # BLD AUTO: 0.06 THOUSANDS/ΜL (ref 0–0.1)
BASOPHILS NFR BLD AUTO: 1 % (ref 0–1)
BILIRUB SERPL-MCNC: 0.3 MG/DL (ref 0.2–1)
BUN SERPL-MCNC: 6 MG/DL (ref 5–25)
CALCIUM SERPL-MCNC: 9 MG/DL (ref 8.3–10.1)
CHLORIDE SERPL-SCNC: 97 MMOL/L (ref 100–108)
CLARITY, POC: CLEAR
CO2 SERPL-SCNC: 34 MMOL/L (ref 21–32)
COLOR, POC: YELLOW
CREAT SERPL-MCNC: 0.74 MG/DL (ref 0.6–1.3)
EOSINOPHIL # BLD AUTO: 0.25 THOUSAND/ΜL (ref 0–0.61)
EOSINOPHIL NFR BLD AUTO: 3 % (ref 0–6)
ERYTHROCYTE [DISTWIDTH] IN BLOOD BY AUTOMATED COUNT: 16.8 % (ref 11.6–15.1)
EXT BILIRUBIN, UA: NORMAL
EXT BLOOD URINE: NORMAL
EXT GLUCOSE, UA: NORMAL
EXT KETONES: NORMAL
EXT NITRITE, UA: NORMAL
EXT PH, UA: 7.5
EXT PROTEIN, UA: NORMAL
EXT SPECIFIC GRAVITY, UA: 1.01
EXT UROBILINOGEN: NORMAL
GFR SERPL CREATININE-BSD FRML MDRD: 88 ML/MIN/1.73SQ M
GLUCOSE SERPL-MCNC: 151 MG/DL (ref 65–140)
H PYLORI AG STL QL IA: NEGATIVE
HCT VFR BLD AUTO: 37 % (ref 34.8–46.1)
HGB BLD-MCNC: 11.1 G/DL (ref 11.5–15.4)
IMM GRANULOCYTES # BLD AUTO: 0.04 THOUSAND/UL (ref 0–0.2)
IMM GRANULOCYTES NFR BLD AUTO: 0 % (ref 0–2)
INR PPP: 1.16 (ref 0.84–1.19)
LIPASE SERPL-CCNC: 73 U/L (ref 73–393)
LYMPHOCYTES # BLD AUTO: 1.26 THOUSANDS/ΜL (ref 0.6–4.47)
LYMPHOCYTES NFR BLD AUTO: 14 % (ref 14–44)
MCH RBC QN AUTO: 23 PG (ref 26.8–34.3)
MCHC RBC AUTO-ENTMCNC: 30 G/DL (ref 31.4–37.4)
MCV RBC AUTO: 77 FL (ref 82–98)
MONOCYTES # BLD AUTO: 0.63 THOUSAND/ΜL (ref 0.17–1.22)
MONOCYTES NFR BLD AUTO: 7 % (ref 4–12)
NEUTROPHILS # BLD AUTO: 6.78 THOUSANDS/ΜL (ref 1.85–7.62)
NEUTS SEG NFR BLD AUTO: 75 % (ref 43–75)
NRBC BLD AUTO-RTO: 0 /100 WBCS
P AXIS: 92 DEGREES
PLATELET # BLD AUTO: 355 THOUSANDS/UL (ref 149–390)
PMV BLD AUTO: 11 FL (ref 8.9–12.7)
POTASSIUM SERPL-SCNC: 3.7 MMOL/L (ref 3.5–5.3)
PR INTERVAL: 186 MS
PROT SERPL-MCNC: 6.9 G/DL (ref 6.4–8.2)
PROTHROMBIN TIME: 14.5 SECONDS (ref 11.6–14.5)
QRS AXIS: 74 DEGREES
QRSD INTERVAL: 74 MS
QT INTERVAL: 360 MS
QTC INTERVAL: 425 MS
RBC # BLD AUTO: 4.83 MILLION/UL (ref 3.81–5.12)
SODIUM SERPL-SCNC: 137 MMOL/L (ref 136–145)
T WAVE AXIS: 79 DEGREES
TROPONIN I SERPL-MCNC: <0.02 NG/ML
VENTRICULAR RATE: 84 BPM
WBC # BLD AUTO: 9.02 THOUSAND/UL (ref 4.31–10.16)
WBC # BLD EST: NORMAL 10*3/UL

## 2020-03-12 PROCEDURE — 84484 ASSAY OF TROPONIN QUANT: CPT | Performed by: PHYSICIAN ASSISTANT

## 2020-03-12 PROCEDURE — 85610 PROTHROMBIN TIME: CPT | Performed by: PHYSICIAN ASSISTANT

## 2020-03-12 PROCEDURE — 74160 CT ABDOMEN W/CONTRAST: CPT

## 2020-03-12 PROCEDURE — 83690 ASSAY OF LIPASE: CPT | Performed by: PHYSICIAN ASSISTANT

## 2020-03-12 PROCEDURE — 71260 CT THORAX DX C+: CPT

## 2020-03-12 PROCEDURE — 85025 COMPLETE CBC W/AUTO DIFF WBC: CPT | Performed by: PHYSICIAN ASSISTANT

## 2020-03-12 PROCEDURE — 99284 EMERGENCY DEPT VISIT MOD MDM: CPT

## 2020-03-12 PROCEDURE — 93005 ELECTROCARDIOGRAM TRACING: CPT

## 2020-03-12 PROCEDURE — 85730 THROMBOPLASTIN TIME PARTIAL: CPT | Performed by: PHYSICIAN ASSISTANT

## 2020-03-12 PROCEDURE — 80053 COMPREHEN METABOLIC PANEL: CPT | Performed by: PHYSICIAN ASSISTANT

## 2020-03-12 PROCEDURE — 93010 ELECTROCARDIOGRAM REPORT: CPT | Performed by: INTERNAL MEDICINE

## 2020-03-12 PROCEDURE — 81002 URINALYSIS NONAUTO W/O SCOPE: CPT | Performed by: PHYSICIAN ASSISTANT

## 2020-03-12 PROCEDURE — 36415 COLL VENOUS BLD VENIPUNCTURE: CPT | Performed by: PHYSICIAN ASSISTANT

## 2020-03-12 PROCEDURE — 99285 EMERGENCY DEPT VISIT HI MDM: CPT | Performed by: PHYSICIAN ASSISTANT

## 2020-03-12 RX ADMIN — IOHEXOL 100 ML: 350 INJECTION, SOLUTION INTRAVENOUS at 13:37

## 2020-03-12 NOTE — ED NOTES
Pt requesting food   Patient informed she had to wait for results of [facility] Samantha Brown RN  03/12/20 [id number]

## 2020-03-12 NOTE — DISCHARGE INSTRUCTIONS
Rest, ice to ribs  Tylenol for discomfort  Take deep breaths to prevent lung infection  Follow up with GI doctor concerning gastritis  Follow up with family doctor in 1 week for recheck of ribs

## 2020-03-12 NOTE — ED PROVIDER NOTES
History  Chief Complaint   Patient presents with    Fall     Pt reports a fall sunday "after 5 beers, i dont remember falling", takes eliquis for afib  Pt reports R rib pain  Patient is a 63 y/o F with h/o DM, asthma, CHF presents to the ED with right rib pain and RUQ abdominal pain after a fall 4 days ago  She states she drank 5 beers, which is normal for her and must have passed out  She states she woke up with right rib pain and her bar stool was overturned  She denies any other injuries, but has abdominal pain  She states she has had this for a couple months, but it is worse  She denies headache or vision changes  She is on Eliquis  She states pain is worse with deep breaths and movement  Her right breast is ecchymotic  History provided by:  Patient  Fall   Mechanism of injury: fall    Injury location:  Torso  Torso injury location:  R chest  Incident location:  Home  Time since incident:  4 days  Arrived directly from scene: no    Fall:     Fall occurred:  Unable to specify    Impact surface:  Unable to specify    Point of impact:  Unable to specify  Protective equipment: none    Suspicion of alcohol use: yes    Tetanus status:  Unknown  Prior to arrival data:     Bystander interventions:  None    Patient ambulatory at scene: yes      Blood loss:  None    Responsiveness at scene:  Alert    Loss of consciousness: unknown  Amnesic to event: yes      Immobilization:  None  Associated symptoms: abdominal pain, chest pain, difficulty breathing and nausea    Associated symptoms: no back pain, no headaches, no neck pain and no vomiting    Risk factors: anticoagulation therapy        Prior to Admission Medications   Prescriptions Last Dose Informant Patient Reported? Taking?    Blood Glucose Monitoring Suppl ([employer] CONTOUR NEXT MONITOR) w/Device KIT   Yes No   Sig: by Does not apply route daily   Cholecalciferol 1000 units tablet   Yes No   Sig: Take 1,000 Units by mouth daily     [place] UNISTIK II LANCETS MISC   No No   Sig: by Does not apply route 3 (three) times a day   LORazepam (ATIVAN) 0 5 mg tablet   No No   Sig: Take 2 tabs by mouth at [facility] and 1 every 6 hours as needed for anxiety   albuterol (PROAIR HFA) 90 mcg/act inhaler   No No   Sig: Inhale 2 puffs every 4 (four) hours as needed for wheezing   apixaban (ELIQUIS) 5 mg   Yes No   Sig: Take 5 mg by mouth 2 (two) times a day   ascorbic acid (VITAMIN C) 1000 MG tablet   Yes No   Sig: Take 1,000 mg by mouth daily     budesonide-formoterol (SYMBICORT) 160-4 5 mcg/act inhaler   No No   Sig: Inhale 2 puffs 2 (two) times a day Rinse mouth after use     flecainide (TAMBOCOR) 100 mg tablet   No No   Sig: Take 1 tablet (100 mg total) by mouth every 12 (twelve) hours   furosemide (LASIX) 40 mg tablet   No No   Sig: Take 1 tablet (40 mg total) by mouth 2 (two) times a day   glimepiride (AMARYL) 4 mg tablet   No No   Sig: Take 1 tablet (4 mg total) by mouth daily with breakfast   glucose blood (CONTOUR NEXT TEST) test strip   No No   Sig: Test blood sugar 3 times a day   hydrOXYzine HCL (ATARAX) 25 mg tablet   No No   Sig: Take 1 tablet (25 mg total) by mouth every 6 (six) hours as needed for itching   metFORMIN (GLUCOPHAGE-XR) 500 mg 24 hr tablet   No No   Sig: Take 2 tablets (1,000 mg total) by mouth 2 (two) times a day with meals   metoprolol succinate (TOPROL-XL) 50 mg 24 hr tablet   No No   Sig: Take 1 tablet (50 mg total) by mouth daily   mometasone (ELOCON) 0 1 % ointment   No No   Sig: Apply topically daily   nystatin (MYCOSTATIN) powder   No No   Sig: Apply topically 4 (four) times a day for 14 days   omeprazole (PriLOSEC) 40 MG capsule   No No   Sig: Take 1 capsule (40 mg total) by mouth daily   potassium chloride (K-DUR,KLOR-CON) 20 mEq tablet   No No   Sig: Take 1 tablet (20 mEq total) by mouth 2 (two) times a day   terbinafine (LamISIL) 1 % cream   No No   Sig: Apply topically 2 (two) times a day for 14 days   traMADol (ULTRAM) 50 mg tablet No No   Sig: TAKE 2 TABLETS BY MOUTH IN THE MORNING AND 2 IN THE EVENING AS NEEDED FOR  MODERATE  TO  SEVERE  PAIN   traZODone (DESYREL) 150 mg tablet   No No   Sig: Take 1 tablet (150 mg total) by mouth daily at bedtime      Facility-Administered Medications: None       Past Medical History:   Diagnosis Date    Anemia     Cervical radiculopathy     [facility]) [facility]     Chronic low back pain     Chronic obstructive asthma ([facility] ) 2/20/2018    Community acquired pneumonia     Diabetes mellitus ([facility] )     Diabetes mellitus with hyperglycemia ([facility])     Elevated liver enzymes     GERD (gastroesophageal reflux disease)     Paresthesia of upper extremity     Plantar fasciitis     Restless leg     Sexual dysfunction     Sleep apnea, obstructive     Tenosynovitis of finger     Tinea corporis     Weakness of upper extremity        Past Surgical History:   Procedure Laterality Date    ABDOMINAL SURGERY      CARPAL TUNNEL RELEASE Bilateral     CHOLECYSTECTOMY      laparoscopic    ESOPHAGOGASTRODUODENOSCOPY N/A 12/3/2018    Procedure: ESOPHAGOGASTRODUODENOSCOPY (EGD) AND COLONOSCOPY;  Surgeon: Den Tsai MD;  [facility];  Service: Gastroenterology    GASTRIC BYPASS      for morbid obesity with gilda en y    HERNIA REPAIR      HYSTERECTOMY      FL EXCIS PRIMARY GANGLION WRIST Left 12/14/2017    Procedure: LONG FINGER GANGLION CYST EXCISION;  Surgeon: Cloivs Gupta MD;  [facility];  Service: Orthopedics    FL INCISE FINGER TENDON SHEATH Left 12/14/2017    Procedure: THUMB, LONG, RING, TRIGGER FINGER RELEASE;  Surgeon: Clovis Gupta MD;  [facility];  Service: Orthopedics    SHOULDER SURGERY         Family History   Problem Relation Age of Onset    Alzheimer's disease Mother     Atrial fibrillation Mother     Dementia Mother     Heart disease Mother         heart problem    Seizures Mother     Parkinsonism Father     Arthritis Father    Rodriguez Atrial fibrillation Father     Substance Abuse Neg Hx         mother,father    Mental illness Neg Hx         mother,father     I have reviewed and agree with the history as documented  E-Cigarette/Vaping     E-Cigarette/Vaping Substances     Social History     Tobacco Use    Smoking status: Current Every Day Smoker     Packs/day: 0 25     Years: 9 00     Pack years: 2 25     Types: Cigarettes    Smokeless tobacco: Never Used    Tobacco comment: hasn't smoked for a few days d/t being sick   Substance Use Topics    Alcohol use: Yes     Comment: about 6 [employer] light every night    Drug use: No       Review of Systems   Constitutional: Negative for chills and fever  HENT: Negative  Eyes: Negative for visual disturbance  Respiratory: Positive for shortness of breath  Negative for cough  Cardiovascular: Positive for chest pain  Negative for leg swelling  Gastrointestinal: Positive for abdominal pain and nausea  Negative for vomiting  Genitourinary: Negative for dysuria  Musculoskeletal: Negative for back pain and neck pain  Skin: Positive for color change  Negative for wound  Neurological: Negative for dizziness, weakness and headaches  Psychiatric/Behavioral: Negative for confusion  All other systems reviewed and are negative  Physical Exam  Physical Exam   Constitutional: She is oriented to person, place, and time  She appears well-developed and well-nourished  She is cooperative  She does not appear ill  No distress  HENT:   Head: Normocephalic and atraumatic  Right Ear: Hearing normal    Left Ear: Hearing normal    Nose: Nose normal    Mouth/Throat: Oropharynx is clear and moist and mucous membranes are normal    Eyes: Pupils are equal, round, and reactive to light  Conjunctivae and EOM are normal    Neck: Normal range of motion  No spinous process tenderness and no muscular tenderness present  Cardiovascular: Normal rate, regular rhythm and normal heart sounds     No murmur heard   Pulmonary/Chest: She has decreased breath sounds (due to poor respiratory effort  )  She exhibits tenderness (right lateral ribs  ) and bony tenderness  She exhibits no laceration, no deformity and no swelling  Abdominal: Soft  Normal appearance and bowel sounds are normal  There is tenderness in the right upper quadrant and epigastric area  There is no rigidity, no rebound and no guarding  Musculoskeletal: Normal range of motion  She exhibits no tenderness or deformity  Neurological: She is alert and oriented to person, place, and time  She has normal strength  No sensory deficit  Gait normal  GCS eye subscore is 4  GCS verbal subscore is 5  GCS motor subscore is 6  Skin: Skin is warm and dry  Bruising (right breast   There is an old bruise to right volar wrist  ) noted  No rash noted  She is not diaphoretic  No pallor  Psychiatric: She has a normal mood and affect  Her speech is normal  Cognition and memory are normal    Nursing note and vitals reviewed        Vital Signs  ED Triage Vitals [03/12/20 1205]   Temperature Pulse Respirations Blood Pressure SpO2   98 °F (36 7 °C) 94 16 144/78 95 %      Temp Source Heart Rate Source Patient Position - Orthostatic VS BP Location FiO2 (%)   Temporal Monitor Lying Left arm --      Pain Score       --           Vitals:    03/12/20 1205 03/12/20 1230 03/12/20 1300 03/12/20 1400   BP: 144/78 140/69 147/69 153/73   Pulse: 94 83 84 82   Patient Position - Orthostatic VS: Lying Lying           Visual Acuity      ED Medications  Medications   iohexol (OMNIPAQUE) 350 MG/ML injection (MULTI-DOSE) 100 mL (100 mL Intravenous Given 3/12/20 1337)       Diagnostic Studies  Results Reviewed     Procedure Component Value Units Date/Time    POCT urinalysis dipstick [[id number]]  (Normal) Resulted:  03/12/20 1317    Lab Status:  Final result Specimen:  Urine Updated:  03/12/20 1317     Color, UA yellow     Clarity, UA clear     Glucose, UA (Ref: Negative) neg Bilirubin, UA (Ref: Negative) neg     Ketones, UA (Ref: Negative) neg     Spec Grav, UA (Ref:1 003-1 030) 1 010     Blood, UA (Ref: Negative) neg     pH, UA (Ref: 4 5-8 0) 7 5     Protein, UA (Ref: Negative) neg     Urobilinogen, UA (Ref: 0 2- 1 0) neg      Leukocytes, UA (Ref: Negative) neg     Nitrite, UA (Ref: Negative) neg    Lipase [[id number]]  (Normal) Collected:  03/12/20 1228    Lab Status:  Final result Specimen:  Blood from Arm, Right Updated:  03/12/20 1307     Lipase 73 u/L     Troponin I [[id number]]  (Normal) Collected:  03/12/20 1228    Lab Status:  Final result Specimen:  Blood from Arm, Right Updated:  03/12/20 1258     Troponin I <0 02 ng/mL     Comprehensive metabolic panel [[id number]]  (Abnormal) Collected:  03/12/20 1228    Lab Status:  Final result Specimen:  Blood from Arm, Right Updated:  03/12/20 1255     Sodium 137 mmol/L      Potassium 3 7 mmol/L      Chloride 97 mmol/L      CO2 34 mmol/L      ANION GAP 6 mmol/L      BUN 6 mg/dL      Creatinine 0 74 mg/dL      Glucose 151 mg/dL      Calcium 9 0 mg/dL      AST 21 U/L      ALT 45 U/L      Alkaline Phosphatase 132 U/L      Total Protein 6 9 g/dL      Albumin 3 3 g/dL      Total Bilirubin 0 30 mg/dL      eGFR 88 ml/min/1 73sq m     Narrative:       Ismael guidelines for Chronic Kidney Disease (CKD):     Stage 1 with normal or high GFR (GFR > 90 mL/min/1 73 square meters)    Stage 2 Mild CKD (GFR = 60-89 mL/min/1 73 square meters)    Stage 3A Moderate CKD (GFR = 45-59 mL/min/1 73 square meters)    Stage 3B Moderate CKD (GFR = 30-44 mL/min/1 73 square meters)    Stage 4 Severe CKD (GFR = 15-29 mL/min/1 73 square meters)    Stage 5 End Stage CKD (GFR <15 mL/min/1 73 square meters)  Note: GFR calculation is accurate only with a steady state creatinine    Protime-INR [[id number]]  (Normal) Collected:  03/12/20 1228    Lab Status:  Final result Specimen:  Blood from Arm, Right Updated:  03/12/20 1249     Protime 14 5 seconds      INR 1 16    APTT [[id number]]  (Normal) Collected:  03/12/20 1228    Lab Status:  Final result Specimen:  Blood from Arm, Right Updated:  03/12/20 1249     PTT 32 seconds     CBC and differential [[id number]]  (Abnormal) Collected:  03/12/20 1228    Lab Status:  Final result Specimen:  Blood from Arm, Right Updated:  03/12/20 1238     WBC 9 02 Thousand/uL      RBC 4 83 Million/uL      Hemoglobin 11 1 g/dL      Hematocrit 37 0 %      MCV 77 fL      MCH 23 0 pg      MCHC 30 0 g/dL      RDW 16 8 %      MPV 11 0 fL      Platelets 135 Thousands/uL      nRBC 0 /100 WBCs      Neutrophils Relative 75 %      Immat GRANS % 0 %      Lymphocytes Relative 14 %      Monocytes Relative 7 %      Eosinophils Relative 3 %      Basophils Relative 1 %      Neutrophils Absolute 6 78 Thousands/µL      Immature Grans Absolute 0 04 Thousand/uL      Lymphocytes Absolute 1 26 Thousands/µL      Monocytes Absolute 0 63 Thousand/µL      Eosinophils Absolute 0 25 Thousand/µL      Basophils Absolute 0 06 Thousands/µL                  CT chest and abdomen w contrast   Final Result by Tania Rizvi MD (03/12 1434)      No sign of acute injury within the chest or abdomen  Status post gastric bypass  Mild diffuse thickening of the excluded portion of the stomach in keeping with a nonspecific gastritis  Workstation performed: [id number]                    Procedures  ECG 12 Lead Documentation Only  Date/Time: 3/12/2020 1:12 PM  Performed by: Wang Cho PA-C  Authorized by: Wang Cho PA-C     Indications / Diagnosis:  Fall  ECG reviewed by me, the ED Provider: yes    Patient location:  ED  Previous ECG:     Previous ECG:  Compared to current    Comparison ECG info:  PVC no longer present     Rate:     ECG rate:  84    ECG rate assessment: normal    Rhythm:     Rhythm: sinus rhythm    Conduction:     Conduction: normal    ST segments:     ST segments:  Normal  T waves:     T waves: normal ED Course                                 MDM  Number of Diagnoses or Management Options  Abdominal pain: established and worsening  Contusion of rib on right side: new and requires workup  Fall: new and requires workup  Gastritis: new and does not require workup  Diagnosis management comments: Patient with right rib and abdominal pain after a fall, will order CT scan chest abd and pelvis to r/o rib fracture, pulmonary contusion, liver injury  Amount and/or Complexity of Data Reviewed  Clinical lab tests: ordered and reviewed  Tests in the radiology section of CPT®: ordered and reviewed    Patient Progress  Patient progress: stable        Disposition  Final diagnoses:   Fall   Contusion of rib on right side   Abdominal pain   Gastritis     Time reflects when diagnosis was documented in both MDM as applicable and the Disposition within this note     Time User Action Codes Description Comment    3/12/2020  2:54 PM Lum Mink Add [I17  XXXA] Fall     3/12/2020  2:54 PM Lum Mink Add [S20 211A] Contusion of rib on right side     3/12/2020  2:54 PM Lum Mink Add [R10 9] Abdominal pain     3/12/2020  2:54 PM Lum Mink Add [K29 70] Gastritis       ED Disposition     ED Disposition Condition Date/Time Comment    Discharge Stable u Mar 12, 2020  2:54 PM Ankru 76 discharge to home/self care              Follow-up Information     Follow up With Specialties Details Why Contact Info Additional Information    [facility], [facility] Call  For recheck of gastritis [address] [place] [address] [phone] [address] [facility], [id number] [phone]          Discharge Medication List as of 3/12/2020  2:55 PM      CONTINUE these medications which have NOT CHANGED    Details   albuterol (PROAIR HFA) 90 mcg/act inhaler Inhale 2 puffs every 4 (four) hours as needed for wheezing, Starting Wed 1/29/2020, Normal      apixaban (ELIQUIS) 5 mg Take 5 mg by mouth 2 (two) times a day, Historical Med      ascorbic acid (VITAMIN C) 1000 MG tablet Take 1,000 mg by mouth daily  , Historical Med      Blood Glucose Monitoring Suppl ([place] CONTOUR NEXT MONITOR) w/Device KIT by Does not apply route daily, Starting Fri 7/14/2017, Historical Med      budesonide-formoterol (SYMBICORT) 160-4 5 mcg/act inhaler Inhale 2 puffs 2 (two) times a day Rinse mouth after use , Starting Mon 2/24/2020, Normal      Cholecalciferol 1000 units tablet Take 1,000 Units by mouth daily  , Historical Med      flecainide (TAMBOCOR) 100 mg tablet Take 1 tablet (100 mg total) by mouth every 12 (twelve) hours, Starting Fri 3/6/2020, Normal      furosemide (LASIX) 40 mg tablet Take 1 tablet (40 mg total) by mouth 2 (two) times a day, Starting Wed 1/8/2020, Normal      glimepiride (AMARYL) 4 mg tablet Take 1 tablet (4 mg total) by mouth daily with breakfast, Starting Wed 1/8/2020, Normal      glucose blood (CONTOUR NEXT TEST) test strip Test blood sugar 3 times a day, Normal      hydrOXYzine HCL (ATARAX) 25 mg tablet Take 1 tablet (25 mg total) by mouth every 6 (six) hours as needed for itching, Starting Wed 11/20/2019, Normal      LIFESCAN UNISTIK II LANCETS MISC by Does not apply route 3 (three) times a day, Starting Wed 2/27/2019, Normal      LORazepam (ATIVAN) 0 5 mg tablet Take 2 tabs by mouth at [facility] and 1 every 6 hours as needed for anxiety, Normal      metFORMIN (GLUCOPHAGE-XR) 500 mg 24 hr tablet Take 2 tablets (1,000 mg total) by mouth 2 (two) times a day with meals, Starting Wed 10/9/2019, Normal      metoprolol succinate (TOPROL-XL) 50 mg 24 hr tablet Take 1 tablet (50 mg total) by mouth daily, Starting Fri 3/6/2020, Normal      mometasone (ELOCON) 0 1 % ointment Apply topically daily, Starting Thu 1/23/2020, Normal      nystatin (MYCOSTATIN) powder Apply topically 4 (four) times a day for 14 days, Starting Fri 10/18/2019, Until Fri 11/1/2019, Normal      omeprazole (PriLOSEC) 40 MG capsule Take 1 capsule (40 mg total) by mouth daily, Starting Fri 5/10/2019, Normal      potassium chloride (K-DUR,KLOR-CON) 20 mEq tablet Take 1 tablet (20 mEq total) by mouth 2 (two) times a day, Starting Fri 3/6/2020, Normal      terbinafine (LamISIL) 1 % cream Apply topically 2 (two) times a day for 14 days, Starting Thu 1/23/2020, Until Thu 2/6/2020, Normal      traMADol (ULTRAM) 50 mg tablet TAKE 2 TABLETS BY MOUTH IN THE MORNING AND 2 IN THE EVENING AS NEEDED FOR  MODERATE  TO  SEVERE  PAIN, Normal      traZODone (DESYREL) 150 mg tablet Take 1 tablet (150 mg total) by mouth daily at bedtime, Starting Sat 2/29/2020, Normal           No discharge procedures on file      PDMP Review       Value Time User    PDMP Reviewed  Yes 2/29/2020  9:13 Kelly Motley MD          ED Provider  Electronically Signed by           Jaylene Salter PA-C  03/12/20 5675

## 2020-03-12 NOTE — ED NOTES
Pt asking for food and drink   Informed she has to wait for results of Erin Harkins RN  03/12/20 2279

## 2020-03-17 ENCOUNTER — VBI (OUTPATIENT)
Dept: FAMILY MEDICINE CLINIC | Facility: HOSPITAL | Age: 60
End: 2020-03-17

## 2020-03-17 NOTE — TELEPHONE ENCOUNTER
Jene Bloch    ED Visit Information     Ed visit date:3/12/2020  Diagnosis Description: Fall; Contusion of rib on right side; Abdominal pain; Gastritis  In Network? [facility]  Discharge status: Home  Discharged with meds ? No  Number of ED visits to date: 1  ED Severity:N/a     Outreach Information    Outreach successful: Yes 1  Date letter mailed:n/a  Date Finalized:3/17/2020    Care Coordination    Follow up appointment with pcp: yes 3/23/2020  Transportation issues ? No    Value [employer] type:  7 Day Outreach  Emergent necessity warranted by diagnosis:  No  [facility] PCP:  Yes  Transportation:  Self Transport  Called PCP first?:  No  Feels able to call PCP for urgent problems ?:  Yes  Understands what emergencies can be handled by PCP ?:  Yes  Ever any problems getting appointment with PCP for minor emergency/urgency problems?:  No  Practice Contacted Patient ?:  No  Pt had ED follow up with pcp/staff ?:  No    Seen for follow-up out of network ?:  No  Reason Patient went to ED instead of Urgent Care or PCP?:  Perceived Severity of Illness  Urgent care Education?:  Yes  03/17/2020 01:47 PM Phone (Eveline Henao) Mallika Love (Self) [phone] (M)   Call Complete  Personal communication with patient regarding recent ED visit on 3/12/2020 for Fall; Contusion of rib on right side; Abdominal pain; Gastritis  Patient was discharged without medication and advised to follow up with PCP  Patient stated that she is feeling better and declined to schedule a follow up appt with PCP at this time  Patient does not meet OPCM criteria  Patient is aware of PCP after hours on-call service  Patient is not aware of urgent care facility and what conditions may be treated there, education given  I reminded patient of her previously scheduled follow up appt on 3/23/2020

## 2020-03-23 ENCOUNTER — TELEMEDICINE (OUTPATIENT)
Dept: FAMILY MEDICINE CLINIC | Facility: HOSPITAL | Age: 60
End: 2020-03-23
Payer: COMMERCIAL

## 2020-03-23 DIAGNOSIS — I48.91 ATRIAL FIBRILLATION, UNSPECIFIED TYPE (HCC): ICD-10-CM

## 2020-03-23 DIAGNOSIS — E11.9 TYPE 2 DIABETES MELLITUS WITHOUT COMPLICATION, WITHOUT LONG-TERM CURRENT USE OF INSULIN (HCC): ICD-10-CM

## 2020-03-23 DIAGNOSIS — F17.210 CIGARETTE NICOTINE DEPENDENCE WITHOUT COMPLICATION: ICD-10-CM

## 2020-03-23 DIAGNOSIS — J45.40 MODERATE PERSISTENT ASTHMA WITHOUT COMPLICATION: Primary | ICD-10-CM

## 2020-03-23 DIAGNOSIS — F32.1 CURRENT MODERATE EPISODE OF MAJOR DEPRESSIVE DISORDER WITHOUT PRIOR EPISODE (HCC): ICD-10-CM

## 2020-03-23 PROCEDURE — 99214 OFFICE O/P EST MOD 30 MIN: CPT | Performed by: FAMILY MEDICINE

## 2020-03-23 NOTE — PROGRESS NOTES
Assessment/Plan:      Problem List Items Addressed This Visit        Endocrine    Type 2 diabetes mellitus, without long-term current use of insulin (HCC)       Respiratory    Moderate persistent asthma without complication - Primary       Cardiovascular and Mediastinum    Atrial fibrillation (HCC)       Other    Cigarette nicotine dependence without complication    Current moderate episode of major depressive disorder without prior episode ([facility] [address] )       1  Asthma  Stable  Continue with the same regimen  Monitor sytmpoms  Has stopped smoking which is helping  2  Diabetes  Doing well  alst a1c was 6 5  Working on dietary control  3  MDD  Stable  No si/hi  Doing well with medications  Has associated anxiety and insomnia  controlled with use of ativan  No red flags  4  chornic pain  Due to low back pain and shoulder pain  With recent fall and bruised ribs  (ribs are improved)  Continues with regular use of tramadol and this has been helping her continue with her activities and daily routine  5  Afib  Stable  On eliquis  No signs of bleeding  Patient ID: Michael Hubbard is a 62 y o  female  HPI      The following portions of the patient's history were reviewed and updated as appropriate: allergies, current medications, past family history, past medical history, past social history, past surgical history and problem list     Review of Systems   Constitutional: Negative  Negative for activity change, appetite change, chills, diaphoresis, fatigue and fever  HENT: Negative for congestion, facial swelling and sore throat  Respiratory: Negative  Negative for apnea, cough, chest tightness and shortness of breath  Cardiovascular: Negative  Negative for chest pain and palpitations  Gastrointestinal: Negative  Negative for abdominal distention, abdominal pain, blood in stool, constipation, diarrhea and nausea  Genitourinary: Negative    Negative for difficulty urinating, dysuria, flank pain and frequency  Objective: There were no vitals filed for this visit    BP Readings from Last 6 Encounters:   03/12/20 153/73   02/24/20 124/70   01/23/20 132/80   11/20/19 134/78   10/18/19 130/84   08/05/19 124/80      Wt Readings from Last 6 Encounters:   03/12/20 119 kg (262 lb 5 6 oz)   02/24/20 119 kg (262 lb)   01/23/20 117 kg (258 lb 6 4 oz)   11/20/19 118 kg (259 lb 6 4 oz)   10/18/19 117 kg (258 lb 12 8 oz)   08/05/19 121 kg (266 lb 12 8 oz)             Physical Exam      Admission on 03/12/2020, Discharged on 03/12/2020   Component Date Value Ref Range Status    WBC 03/12/2020 9 02  4 31 - 10 16 Thousand/uL Final    RBC 03/12/2020 4 83  3 81 - 5 12 Million/uL Final    Hemoglobin 03/12/2020 11 1* 11 5 - 15 4 g/dL Final    Hematocrit 03/12/2020 37 0  34 8 - 46 1 % Final    MCV 03/12/2020 77* 82 - 98 fL Final    MCH 03/12/2020 23 0* 26 8 - 34 3 pg Final    MCHC 03/12/2020 30 0* 31 4 - 37 4 g/dL Final    RDW 03/12/2020 16 8* 11 6 - 15 1 % Final    MPV 03/12/2020 11 0  8 9 - 12 7 fL Final    Platelets 75/72/1907 355  149 - 390 Thousands/uL Final    nRBC 03/12/2020 0  /100 WBCs Final    Neutrophils Relative 03/12/2020 75  43 - 75 % Final    Immat GRANS % 03/12/2020 0  0 - 2 % Final    Lymphocytes Relative 03/12/2020 14  14 - 44 % Final    Monocytes Relative 03/12/2020 7  4 - 12 % Final    Eosinophils Relative 03/12/2020 3  0 - 6 % Final    Basophils Relative 03/12/2020 1  0 - 1 % Final    Neutrophils Absolute 03/12/2020 6 78  1 85 - 7 62 Thousands/µL Final    Immature Grans Absolute 03/12/2020 0 04  0 00 - 0 20 Thousand/uL Final    Lymphocytes Absolute 03/12/2020 1 26  0 60 - 4 47 Thousands/µL Final    Monocytes Absolute 03/12/2020 0 63  0 17 - 1 22 Thousand/µL Final    Eosinophils Absolute 03/12/2020 0 25  0 00 - 0 61 Thousand/µL Final    Basophils Absolute 03/12/2020 0 06  0 00 - 0 10 Thousands/µL Final    Sodium 03/12/2020 137  136 - 145 mmol/L Final    Potassium 03/12/2020 3 7  3 5 - 5 3 mmol/L Final    Chloride 03/12/2020 97* 100 - 108 mmol/L Final    CO2 03/12/2020 34* 21 - 32 mmol/L Final    ANION GAP 03/12/2020 6  4 - 13 mmol/L Final    BUN 03/12/2020 6  5 - 25 mg/dL Final    Creatinine 03/12/2020 0 74  0 60 - 1 30 mg/dL Final    Standardized to IDMS reference method    Glucose 03/12/2020 151* 65 - 140 mg/dL Final      If the patient is fasting, the ADA then defines impaired fasting glucose as > 100 mg/dL and diabetes as > or equal to 123 mg/dL  Specimen collection should occur prior to Sulfasalazine administration due to the potential for falsely depressed results  Specimen collection should occur prior to Sulfapyridine administration due to the potential for falsely elevated results   Calcium 03/12/2020 9 0  8 3 - 10 1 mg/dL Final    AST 03/12/2020 21  5 - 45 U/L Final      Specimen collection should occur prior to Sulfasalazine administration due to the potential for falsely depressed results   ALT 03/12/2020 45  12 - 78 U/L Final      Specimen collection should occur prior to Sulfasalazine administration due to the potential for falsely depressed results       Alkaline Phosphatase 03/12/2020 132* 46 - 116 U/L Final    Total Protein 03/12/2020 6 9  6 4 - 8 2 g/dL Final    Albumin 03/12/2020 3 3* 3 5 - 5 0 g/dL Final    Total Bilirubin 03/12/2020 0 30  0 20 - 1 00 mg/dL Final    eGFR 03/12/2020 88  ml/min/1 73sq m Final    Protime 03/12/2020 14 5  11 6 - 14 5 seconds Final    INR 03/12/2020 1 16  0 84 - 1 19 Final    PTT 03/12/2020 32  23 - 37 seconds Final    Therapeutic Heparin Range =  60-90 seconds    Troponin I 03/12/2020 <0 02  <=0 04 ng/mL Final    3Autovalidation override  [employer] Chemistry analyzer 99% cutoff is > 0 04 ng/mL in network labs     o cTnI 99% cutoff is useful only when applied to patients in the clinical setting of myocardial ischemia   o cTnI 99% cutoff should be interpreted in the context of clinical history, ECG findings and possibly cardiac imaging to establish correct diagnosis  o cTnI 99% cutoff may be suggestive but clearly not indicative of a coronary event without the clinical setting of myocardial ischemia        Color, UA 03/12/2020 yellow   Final    Clarity, UA 03/12/2020 clear   Final    Glucose, UA (Ref: Negative) 03/12/2020 neg   Final    Bilirubin, UA (Ref: Negative) 03/12/2020 neg   Final    Ketones, UA (Ref: Negative) 03/12/2020 neg   Final    Spec Grav, UA (Ref:1 [id number] [id number]) 03/12/2020 1 010   Final    Blood, UA (Ref: Negative) 03/12/2020 neg   Final    pH, UA (Ref: 4 5-8 0) 03/12/2020 7 5   Final    Protein, UA (Ref: Negative) 03/12/2020 neg   Final    Urobilinogen, UA (Ref: 0 2- 1 0) 03/12/2020 neg   Final     Leukocytes, UA (Ref: Negative) 03/12/2020 neg   Final    Nitrite, UA (Ref: Negative) 03/12/2020 neg   Final    Lipase 03/12/2020 73  73 - 393 u/L Final    Ventricular Rate 03/12/2020 84  BPM Final    Atrial Rate 03/12/2020 84  BPM Final    [place] Interval 03/12/2020 186  ms Final    QRSD Interval 03/12/2020 74  ms Final    QT Interval 03/12/2020 360  ms Final    QTC Interval 03/12/2020 425  ms Final    P Axis 03/12/2020 92  degrees Final    QRS Axis 03/12/2020 74  degrees Final    T Wave [place] 03/12/2020 79  degrees Final   Orders Only on 03/11/2020   Component Date Value Ref Range Status    H pylori Ag, Stl 03/11/2020 Negative  Negative Final   Office Visit on 02/24/2020   Component Date Value Ref Range Status    LEUKOCYTE ESTERASE,UA 02/24/2020 Trace   Final    NITRITE,UA 02/24/2020 Positive   Final    SL AMB POCT UROBILINOGEN 02/24/2020 Normal   Final    POCT URINE PROTEIN 02/24/2020 30   Final     PH,UA 02/24/2020 8   Final    BLOOD,UA 02/24/2020 50   Final    SPECIFIC GRAVITY,UA 02/24/2020 1 005   Final    KETONES,UA 02/24/2020 Negative   Final    BILIRUBIN,UA 02/24/2020 Negative   Final    GLUCOSE, UA 02/24/2020 Normal   Final     COLOR,UA 02/24/2020 Yellow   Final    CLARITY,UA 02/24/2020 Clear   Final    Amylase, Serum 03/09/2020 31  31 - 110 U/L Final    Lipase, Serum 03/09/2020 11* 14 - 72 U/L Final    Glucose, Random 03/09/2020 147* 65 - 99 mg/dL Final    BUN 03/09/2020 5* 8 - 27 mg/dL Final    Creatinine 03/09/2020 0 56* 0 57 - 1 00 mg/dL Final    eGFR Non African American 03/09/2020 101  >59 mL/min/1 73 Final    eGFR African American 03/09/2020 116  >59 mL/min/1 73 Final    SL AMB BUN/CREATININE RATIO 03/09/2020 9* 12 - 28 Final    Sodium 03/09/2020 134  134 - 144 mmol/L Final    Potassium 03/09/2020 4 5  3 5 - 5 2 mmol/L Final    Chloride 03/09/2020 89* 96 - 106 mmol/L Final    CO2 03/09/2020 28  20 - 29 mmol/L Final    CALCIUM 03/09/2020 8 9  8 7 - 10 3 mg/dL Final    Protein, Total 03/09/2020 6 5  6 0 - 8 5 g/dL Final    Albumin 03/09/2020 4 4  3 8 - 4 8 g/dL Final    Globulin, Total 03/09/2020 2 1  1 5 - 4 5 g/dL Final    Albumin/Globulin Ratio 03/09/2020 2 1  1 2 - 2 2 Final    TOTAL BILIRUBIN 03/09/2020 0 3  0 0 - 1 2 mg/dL Final    Alk Phos Isoenzymes 03/09/2020 115  39 - 117 IU/L Final    AST 03/09/2020 55* 0 - 40 IU/L Final    ALT 03/09/2020 52* 0 - 32 IU/L Final    Urine Culture Result 02/24/2020 Final report*  Final    Result 1 02/24/2020 Escherichia coli*  Final    Comment: 50,000-100,000 colony forming units per mL  Cefazolin <=4 ug/mL  Cefazolin with an FILIPE <=16 predicts susceptibility to the oral agents  cefaclor, cefdinir, cefpodoxime, cefprozil, cefuroxime, cephalexin,  and loracarbef when used for therapy of uncomplicated urinary tract  infections due to E  coli, Klebsiella pneumoniae, and Proteus  mirabilis        SL AMB ANTIMICROBIAL SUSCEPTIBILITY 02/24/2020 Comment   Final    Comment:       ** S = Susceptible; I = Intermediate; R = Resistant **                     P = Positive; N = Negative              MICS are expressed in micrograms per mL     Antibiotic                 RSLT#1    RSLT#2    RSLT#3 RSLT#4  Amoxicillin/Clavulanic Acid    S  Ampicillin                     S  Cefepime                       S  Ceftriaxone                    S  Ciprofloxacin                  S  Ertapenem                      S  Gentamicin                     S  Imipenem                       S  Levofloxacin                   S  Meropenem                      S  Nitrofurantoin                 S  Piperacillin/Tazobactam        S  Tetracycline                   S  Tobramycin                     S  Trimethoprim/Sulfa             S     Office Visit on 01/23/2020   Component Date Value Ref Range Status    Hemoglobin A1C 01/23/2020 6 5  6 5 Final     Virtual Regular Visit        Encounter provider Jacqueline Mayo MD    Provider located at [address] [place] [address] MD  [address] [place] [id number]      Recent Visits  No visits were found meeting these conditions  Showing recent visits within past 7 days and meeting all other requirements     Future Appointments  No visits were found meeting these conditions  Showing future appointments within next 150 days and meeting all other requirements        After connecting through [employer], the patient was identified by name and date of birth  Juliana Rae was informed that this is a telemedicine visit and that the visit is being conducted through [employer] which may not be secure and therefore, might not be HIPAA-compliant  My office door was closed  No one else was in the room  She acknowledged consent and understanding of privacy and security of the video platform  The patient has agreed to participate and understands they can discontinue the visit at any time  Subjective  Juliana Rae is a 62 y o  female   Reason for visit is follow up of chronic conditions  1  Asthma  Doing well with current regimen  On symbicort regularly  Uses albuterol prn  No recent exacerbations       2  Since the last visit she did have a fall [address] in bruised ribs  This was associated with alcohol intake  Has not had alcohol since then  Has not had problems with alcohol before  Pain of the ribs are improved  No sob, no coughing  No fever, no chills  3  Diabetes  Stable  BS okay  Working on diet  Has not been able to exercise much  4  Afib  Doing well with medications  On eliquis also  No bleeding  5  Nicotine  Quit back in December 2019  6  MDD with anxiety  Stable  No si/hi  Feeling good the way things are  Increase stress with care for her mother but has help now 5 times a week  Has had increase stress with quarantine due to Covid epidemic       Past Medical History:   Diagnosis Date    Anemia     Cervical radiculopathy     CH[facility]congest[facility] failure[facility])     Chronic low back pain     Chronic obstructive asthma ([facility] [address] ) 2/20/2018    Community acquired pneumonia     [facility]     Diabetes mellitus with hyperglycemia (HCC)     Elevated liver enzymes     GERD (gastroesophageal reflux disease)     Paresthesia of upper extremity     Plantar fasciitis     Restless leg     Sexual dysfunction     Sleep apnea, obstructive     Tenosynovitis of finger     Tinea corporis     Weakness of upper extremity        Past Surgical History:   Procedure Laterality Date    ABDOMINAL SURGERY      CARPAL TUNNEL RELEASE Bilateral     CHOLECYSTECTOMY      laparoscopic    ESOPHAGOGASTRODUODENOSCOPY N/A 12/3/2018    Procedure: ESOPHAGOGASTRODUODENOSCOPY (EGD) AND COLONOSCOPY;  Surgeon: Sabi Lozano MD;  Location: [facility];  Service: Gastroenterology    GASTRIC BYPASS      for morbid obesity with gilda en y   [address] PRIMARY GANGLION WRIST Left 12/14/2017    Procedure: LONG FINGER GANGLION CYST EXCISION;  Surgeon: Chanda Wang MD;  [facility];  Service: Orthopedics    OR INCISE FINGER TENDON SHEATH Left 12/14/2017    Procedure: THUMB, LONG, RING, TRIGGER FINGER RELEASE;  Surgeon: Moni Ferguson MD;  [facility]: [facility];  Service: Orthopedics    SHOULDER SURGERY         Current Outpatient Medications   Medication Sig Dispense Refill    albuterol (PROAIR HFA) 90 mcg/act inhaler Inhale 2 puffs every 4 (four) hours as needed for wheezing 1 Inhaler 0    apixaban (ELIQUIS) 5 mg Take 5 mg by mouth 2 (two) times a day      ascorbic acid (VITAMIN C) 1000 MG tablet Take 1,000 mg by mouth daily        Blood Glucose Monitoring Suppl (idio CONTOUR NEXT MONITOR) w/Device KIT by Does not apply route daily      budesonide-formoterol (SYMBICORT) 160-4 5 mcg/act inhaler Inhale 2 puffs 2 (two) times a day Rinse mouth after use   1 Inhaler 0    Cholecalciferol 1000 units tablet Take 1,000 Units by mouth daily        flecainide (TAMBOCOR) 100 mg tablet Take 1 tablet (100 mg total) by mouth every 12 (twelve) hours 180 tablet 1    furosemide (LASIX) 40 mg tablet Take 1 tablet (40 mg total) by mouth 2 (two) times a day 60 tablet 5    glimepiride (AMARYL) 4 mg tablet Take 1 tablet (4 mg total) by mouth daily with breakfast 30 tablet 5    glucose blood (CONTOUR NEXT TEST) test strip Test blood sugar 3 times a day 100 each 3    hydrOXYzine HCL (ATARAX) 25 mg tablet Take 1 tablet (25 mg total) by mouth every 6 (six) hours as needed for itching 30 tablet 0    LIFESCAN UNISTIK II LANCETS MISC by Does not apply route 3 (three) times a day 100 each 5    LORazepam (ATIVAN) 0 5 mg tablet Take 2 tabs by mouth at [facility] and 1 every 6 hours as needed for anxiety 90 tablet 0    metFORMIN (GLUCOPHAGE-XR) 500 mg 24 hr tablet Take 2 tablets (1,000 mg total) by mouth 2 (two) times a day with meals 120 tablet 5    metoprolol succinate (TOPROL-XL) 50 mg 24 hr tablet Take 1 tablet (50 mg total) by mouth daily 90 tablet 1    mometasone (ELOCON) 0 1 % ointment Apply topically daily 15 g 0    nystatin (MYCOSTATIN) powder Apply topically 4 (four) times a day for 14 days 15 g 0    omeprazole (PriLOSEC) 40 MG capsule Take 1 capsule (40 mg total) by mouth daily 90 capsule 3    potassium chloride (K-DUR,KLOR-CON) 20 mEq tablet Take 1 tablet (20 mEq total) by mouth 2 (two) times a day 180 tablet 1    terbinafine (LamISIL) 1 % cream Apply topically 2 (two) times a day for 14 days 30 g 1    traMADol (ULTRAM) 50 mg tablet TAKE 2 TABLETS BY MOUTH IN THE MORNING AND 2 IN THE EVENING AS NEEDED FOR  MODERATE  TO  SEVERE  PAIN 120 tablet 0    traZODone (DESYREL) 150 mg tablet Take 1 tablet (150 mg total) by mouth daily at bedtime 30 tablet 0     No current facility-administered medications for this visit  Allergies   Allergen Reactions    Iron Dextran Swelling    Januvia [Sitagliptin] Shortness Of Breath    Jardiance [Empagliflozin] Shortness Of Breath    Clonazepam Other (See Comments)     Unknown reaction    Codeine Itching and Other (See Comments)     itch;mary kay morphine,takes ultram @home    Adhesive [Medical Tape]      itching    Effexor [Venlafaxine] Itching    Lactose     Oxycodone-Acetaminophen      Other reaction(s): Other (See Comments)  (PERCOCET) MILD RASH    Oxycodone-Acetaminophen Anxiety       No acute distress over video  I spent 20 minutes with the patient during this visit      [id number]

## 2020-03-31 DIAGNOSIS — G89.29 NECK PAIN, CHRONIC: ICD-10-CM

## 2020-03-31 DIAGNOSIS — M54.50 CHRONIC LOW BACK PAIN WITHOUT SCIATICA, UNSPECIFIED BACK PAIN LATERALITY: ICD-10-CM

## 2020-03-31 DIAGNOSIS — G47.00 INSOMNIA, UNSPECIFIED TYPE: ICD-10-CM

## 2020-03-31 DIAGNOSIS — Z79.899 ENCOUNTER FOR LONG-TERM (CURRENT) DRUG USE: ICD-10-CM

## 2020-03-31 DIAGNOSIS — G89.29 CHRONIC LOW BACK PAIN WITHOUT SCIATICA, UNSPECIFIED BACK PAIN LATERALITY: ICD-10-CM

## 2020-03-31 DIAGNOSIS — M54.2 NECK PAIN, CHRONIC: ICD-10-CM

## 2020-04-01 ENCOUNTER — TELEPHONE (OUTPATIENT)
Dept: FAMILY MEDICINE CLINIC | Facility: HOSPITAL | Age: 60
End: 2020-04-01

## 2020-04-01 RX ORDER — TRAMADOL HYDROCHLORIDE 50 MG/1
TABLET ORAL
Qty: 120 TABLET | Refills: 0 | Status: SHIPPED | OUTPATIENT
Start: 2020-04-01 | End: 2020-11-04

## 2020-04-01 RX ORDER — LORAZEPAM 0.5 MG/1
TABLET ORAL
Qty: 90 TABLET | Refills: 0 | Status: SHIPPED | OUTPATIENT
Start: 2020-04-01 | End: 2020-04-28 | Stop reason: SDUPTHER

## 2020-04-01 RX ORDER — TRAZODONE HYDROCHLORIDE 150 MG/1
TABLET ORAL
Qty: 30 TABLET | Refills: 0 | Status: SHIPPED | OUTPATIENT
Start: 2020-04-01 | End: 2020-04-28 | Stop reason: SDUPTHER

## 2020-04-06 DIAGNOSIS — J45.40 MODERATE PERSISTENT ASTHMA WITHOUT COMPLICATION: ICD-10-CM

## 2020-04-06 RX ORDER — BUDESONIDE AND FORMOTEROL FUMARATE DIHYDRATE 160; 4.5 UG/1; UG/1
AEROSOL RESPIRATORY (INHALATION)
Qty: 11 G | Refills: 0 | Status: SHIPPED | OUTPATIENT
Start: 2020-04-06 | End: 2020-04-08

## 2020-04-08 DIAGNOSIS — J45.40 MODERATE PERSISTENT ASTHMA WITHOUT COMPLICATION: ICD-10-CM

## 2020-04-08 RX ORDER — BUDESONIDE AND FORMOTEROL FUMARATE DIHYDRATE 160; 4.5 UG/1; UG/1
AEROSOL RESPIRATORY (INHALATION)
Qty: 11 G | Refills: 0 | Status: SHIPPED | OUTPATIENT
Start: 2020-04-08 | End: 2020-08-07 | Stop reason: SDUPTHER

## 2020-04-28 DIAGNOSIS — Z79.899 ENCOUNTER FOR LONG-TERM (CURRENT) DRUG USE: ICD-10-CM

## 2020-04-28 DIAGNOSIS — J44.9 CHRONIC OBSTRUCTIVE PULMONARY DISEASE, UNSPECIFIED COPD TYPE (HCC): ICD-10-CM

## 2020-04-28 DIAGNOSIS — G47.00 INSOMNIA, UNSPECIFIED TYPE: ICD-10-CM

## 2020-04-28 DIAGNOSIS — K21.9 GASTROESOPHAGEAL REFLUX DISEASE WITHOUT ESOPHAGITIS: ICD-10-CM

## 2020-04-29 RX ORDER — OMEPRAZOLE 40 MG/1
40 CAPSULE, DELAYED RELEASE ORAL DAILY
Qty: 90 CAPSULE | Refills: 3 | Status: SHIPPED | OUTPATIENT
Start: 2020-04-29 | End: 2021-02-05 | Stop reason: SDUPTHER

## 2020-04-29 RX ORDER — LORAZEPAM 0.5 MG/1
TABLET ORAL
Qty: 90 TABLET | Refills: 0 | Status: SHIPPED | OUTPATIENT
Start: 2020-04-29 | End: 2020-05-26 | Stop reason: SDUPTHER

## 2020-04-29 RX ORDER — TRAZODONE HYDROCHLORIDE 150 MG/1
150 TABLET ORAL
Qty: 30 TABLET | Refills: 0 | Status: SHIPPED | OUTPATIENT
Start: 2020-04-29 | End: 2020-05-26 | Stop reason: SDUPTHER

## 2020-04-29 RX ORDER — METFORMIN HYDROCHLORIDE 500 MG/1
1000 TABLET, EXTENDED RELEASE ORAL 2 TIMES DAILY WITH MEALS
Qty: 120 TABLET | Refills: 5 | Status: SHIPPED | OUTPATIENT
Start: 2020-04-29 | End: 2020-10-19 | Stop reason: SDUPTHER

## 2020-04-30 DIAGNOSIS — J45.909 UNCOMPLICATED ASTHMA, UNSPECIFIED ASTHMA SEVERITY, UNSPECIFIED WHETHER PERSISTENT: ICD-10-CM

## 2020-04-30 RX ORDER — ALBUTEROL SULFATE 90 UG/1
AEROSOL, METERED RESPIRATORY (INHALATION)
Qty: 9 G | Refills: 3 | Status: SHIPPED | OUTPATIENT
Start: 2020-04-30 | End: 2020-08-07 | Stop reason: SDUPTHER

## 2020-05-21 ENCOUNTER — APPOINTMENT (EMERGENCY)
Dept: RADIOLOGY | Facility: HOSPITAL | Age: 60
DRG: 133 | End: 2020-05-21
Payer: COMMERCIAL

## 2020-05-21 ENCOUNTER — APPOINTMENT (INPATIENT)
Dept: RADIOLOGY | Facility: HOSPITAL | Age: 60
DRG: 133 | End: 2020-05-21
Payer: COMMERCIAL

## 2020-05-21 ENCOUNTER — APPOINTMENT (INPATIENT)
Dept: CT IMAGING | Facility: HOSPITAL | Age: 60
DRG: 133 | End: 2020-05-21
Payer: COMMERCIAL

## 2020-05-21 ENCOUNTER — HOSPITAL ENCOUNTER (INPATIENT)
Facility: HOSPITAL | Age: 60
LOS: 3 days | Discharge: HOME | DRG: 133 | End: 2020-05-24
Attending: EMERGENCY MEDICINE | Admitting: INTERNAL MEDICINE
Payer: COMMERCIAL

## 2020-05-21 ENCOUNTER — TELEMEDICINE (OUTPATIENT)
Dept: FAMILY MEDICINE CLINIC | Facility: HOSPITAL | Age: 60
End: 2020-05-21
Payer: COMMERCIAL

## 2020-05-21 VITALS
HEIGHT: 63 IN | HEART RATE: 84 BPM | WEIGHT: 268 LBS | DIASTOLIC BLOOD PRESSURE: 76 MMHG | SYSTOLIC BLOOD PRESSURE: 144 MMHG | BODY MASS INDEX: 47.48 KG/M2

## 2020-05-21 DIAGNOSIS — J44.9 COPD (CHRONIC OBSTRUCTIVE PULMONARY DISEASE) (HCC): ICD-10-CM

## 2020-05-21 DIAGNOSIS — J18.9 COMMUNITY ACQUIRED PNEUMONIA: ICD-10-CM

## 2020-05-21 DIAGNOSIS — D50.9 MICROCYTIC ANEMIA: ICD-10-CM

## 2020-05-21 DIAGNOSIS — F17.210 CIGARETTE NICOTINE DEPENDENCE WITHOUT COMPLICATION: ICD-10-CM

## 2020-05-21 DIAGNOSIS — R93.89 ABNORMAL CT OF THE CHEST: ICD-10-CM

## 2020-05-21 DIAGNOSIS — J96.01 ACUTE RESPIRATORY FAILURE WITH HYPOXIA (HCC): Primary | ICD-10-CM

## 2020-05-21 DIAGNOSIS — R19.7 DIARRHEA, UNSPECIFIED TYPE: ICD-10-CM

## 2020-05-21 DIAGNOSIS — J45.40 MODERATE PERSISTENT ASTHMA WITHOUT COMPLICATION: ICD-10-CM

## 2020-05-21 DIAGNOSIS — R06.00 DYSPNEA, UNSPECIFIED TYPE: Primary | ICD-10-CM

## 2020-05-21 PROBLEM — F10.10 ALCOHOL ABUSE: Status: ACTIVE | Noted: 2020-05-21

## 2020-05-21 PROBLEM — I50.9 CONGESTIVE HEART FAILURE (CHF) (HCC): Status: ACTIVE | Noted: 2020-05-21

## 2020-05-21 LAB
ALBUMIN SERPL BCP-MCNC: 3.7 G/DL (ref 3.5–5)
ALP SERPL-CCNC: 136 U/L (ref 46–116)
ALT SERPL W P-5'-P-CCNC: 31 U/L (ref 12–78)
ANION GAP SERPL CALCULATED.3IONS-SCNC: 6 MMOL/L (ref 4–13)
APTT PPP: 33 SECONDS (ref 23–37)
AST SERPL W P-5'-P-CCNC: 23 U/L (ref 5–45)
ATRIAL RATE: 77 BPM
BASOPHILS # BLD AUTO: 0.07 THOUSANDS/ΜL (ref 0–0.1)
BASOPHILS NFR BLD AUTO: 1 % (ref 0–1)
BILIRUB SERPL-MCNC: 0.3 MG/DL (ref 0.2–1)
BILIRUB UR QL STRIP: NEGATIVE
BUN SERPL-MCNC: 11 MG/DL (ref 5–25)
CALCIUM SERPL-MCNC: 9 MG/DL (ref 8.3–10.1)
CHLORIDE SERPL-SCNC: 96 MMOL/L (ref 100–108)
CLARITY UR: CLEAR
CO2 SERPL-SCNC: 32 MMOL/L (ref 21–32)
COLOR UR: YELLOW
CREAT SERPL-MCNC: 0.87 MG/DL (ref 0.6–1.3)
EOSINOPHIL # BLD AUTO: 0.3 THOUSAND/ΜL (ref 0–0.61)
EOSINOPHIL NFR BLD AUTO: 2 % (ref 0–6)
ERYTHROCYTE [DISTWIDTH] IN BLOOD BY AUTOMATED COUNT: 16.8 % (ref 11.6–15.1)
GFR SERPL CREATININE-BSD FRML MDRD: 72 ML/MIN/1.73SQ M
GLUCOSE SERPL-MCNC: 123 MG/DL (ref 65–140)
GLUCOSE SERPL-MCNC: 160 MG/DL (ref 65–140)
GLUCOSE SERPL-MCNC: 322 MG/DL (ref 65–140)
GLUCOSE UR STRIP-MCNC: NEGATIVE MG/DL
HCT VFR BLD AUTO: 37.7 % (ref 34.8–46.1)
HGB BLD-MCNC: 11.2 G/DL (ref 11.5–15.4)
HGB UR QL STRIP.AUTO: NEGATIVE
IMM GRANULOCYTES # BLD AUTO: 0.07 THOUSAND/UL (ref 0–0.2)
IMM GRANULOCYTES NFR BLD AUTO: 1 % (ref 0–2)
INR PPP: 1.16 (ref 0.84–1.19)
KETONES UR STRIP-MCNC: NEGATIVE MG/DL
L PNEUMO1 AG UR QL IA.RAPID: NEGATIVE
LACTATE SERPL-SCNC: 2.7 MMOL/L (ref 0.5–2)
LACTATE SERPL-SCNC: 2.9 MMOL/L (ref 0.5–2)
LACTATE SERPL-SCNC: 3.3 MMOL/L (ref 0.5–2)
LACTATE SERPL-SCNC: 4.4 MMOL/L (ref 0.5–2)
LEUKOCYTE ESTERASE UR QL STRIP: NEGATIVE
LYMPHOCYTES # BLD AUTO: 1.41 THOUSANDS/ΜL (ref 0.6–4.47)
LYMPHOCYTES NFR BLD AUTO: 10 % (ref 14–44)
MCH RBC QN AUTO: 21.8 PG (ref 26.8–34.3)
MCHC RBC AUTO-ENTMCNC: 29.7 G/DL (ref 31.4–37.4)
MCV RBC AUTO: 73 FL (ref 82–98)
MONOCYTES # BLD AUTO: 0.94 THOUSAND/ΜL (ref 0.17–1.22)
MONOCYTES NFR BLD AUTO: 7 % (ref 4–12)
NEUTROPHILS # BLD AUTO: 10.76 THOUSANDS/ΜL (ref 1.85–7.62)
NEUTS SEG NFR BLD AUTO: 79 % (ref 43–75)
NITRITE UR QL STRIP: NEGATIVE
NRBC BLD AUTO-RTO: 0 /100 WBCS
NT-PROBNP SERPL-MCNC: 207 PG/ML
P AXIS: 87 DEGREES
PH UR STRIP.AUTO: 5.5 [PH]
PLATELET # BLD AUTO: 325 THOUSANDS/UL (ref 149–390)
PMV BLD AUTO: 10.6 FL (ref 8.9–12.7)
POTASSIUM SERPL-SCNC: 3.9 MMOL/L (ref 3.5–5.3)
PR INTERVAL: 178 MS
PROCALCITONIN SERPL-MCNC: <0.05 NG/ML
PROT SERPL-MCNC: 7.5 G/DL (ref 6.4–8.2)
PROT UR STRIP-MCNC: NEGATIVE MG/DL
PROTHROMBIN TIME: 14.5 SECONDS (ref 11.6–14.5)
QRS AXIS: 84 DEGREES
QRSD INTERVAL: 74 MS
QT INTERVAL: 380 MS
QTC INTERVAL: 430 MS
RBC # BLD AUTO: 5.14 MILLION/UL (ref 3.81–5.12)
S PNEUM AG UR QL: NEGATIVE
SARS-COV-2 RNA RESP QL NAA+PROBE: NEGATIVE
SODIUM SERPL-SCNC: 134 MMOL/L (ref 136–145)
SP GR UR STRIP.AUTO: 1.01 (ref 1–1.03)
T WAVE AXIS: 81 DEGREES
TROPONIN I SERPL-MCNC: <0.02 NG/ML
UROBILINOGEN UR QL STRIP.AUTO: 0.2 E.U./DL
VENTRICULAR RATE: 77 BPM
WBC # BLD AUTO: 13.55 THOUSAND/UL (ref 4.31–10.16)

## 2020-05-21 PROCEDURE — 93005 ELECTROCARDIOGRAM TRACING: CPT

## 2020-05-21 PROCEDURE — 85610 PROTHROMBIN TIME: CPT

## 2020-05-21 PROCEDURE — 94664 DEMO&/EVAL PT USE INHALER: CPT

## 2020-05-21 PROCEDURE — 99223 1ST HOSP IP/OBS HIGH 75: CPT | Performed by: INTERNAL MEDICINE

## 2020-05-21 PROCEDURE — 71250 CT THORAX DX C-: CPT

## 2020-05-21 PROCEDURE — 93010 ELECTROCARDIOGRAM REPORT: CPT | Performed by: INTERNAL MEDICINE

## 2020-05-21 PROCEDURE — 83605 ASSAY OF LACTIC ACID: CPT

## 2020-05-21 PROCEDURE — 85025 COMPLETE CBC W/AUTO DIFF WBC: CPT

## 2020-05-21 PROCEDURE — 83605 ASSAY OF LACTIC ACID: CPT | Performed by: PHYSICIAN ASSISTANT

## 2020-05-21 PROCEDURE — 85730 THROMBOPLASTIN TIME PARTIAL: CPT

## 2020-05-21 PROCEDURE — 87205 SMEAR GRAM STAIN: CPT | Performed by: PHYSICIAN ASSISTANT

## 2020-05-21 PROCEDURE — 94660 CPAP INITIATION&MGMT: CPT

## 2020-05-21 PROCEDURE — 94640 AIRWAY INHALATION TREATMENT: CPT

## 2020-05-21 PROCEDURE — 81003 URINALYSIS AUTO W/O SCOPE: CPT

## 2020-05-21 PROCEDURE — 87070 CULTURE OTHR SPECIMN AEROBIC: CPT | Performed by: PHYSICIAN ASSISTANT

## 2020-05-21 PROCEDURE — 84145 PROCALCITONIN (PCT): CPT

## 2020-05-21 PROCEDURE — 94760 N-INVAS EAR/PLS OXIMETRY 1: CPT

## 2020-05-21 PROCEDURE — 87635 SARS-COV-2 COVID-19 AMP PRB: CPT | Performed by: PHYSICIAN ASSISTANT

## 2020-05-21 PROCEDURE — 87449 NOS EACH ORGANISM AG IA: CPT | Performed by: PHYSICIAN ASSISTANT

## 2020-05-21 PROCEDURE — 84484 ASSAY OF TROPONIN QUANT: CPT

## 2020-05-21 PROCEDURE — 99285 EMERGENCY DEPT VISIT HI MDM: CPT | Performed by: PHYSICIAN ASSISTANT

## 2020-05-21 PROCEDURE — 36415 COLL VENOUS BLD VENIPUNCTURE: CPT

## 2020-05-21 PROCEDURE — 82948 REAGENT STRIP/BLOOD GLUCOSE: CPT

## 2020-05-21 PROCEDURE — 83880 ASSAY OF NATRIURETIC PEPTIDE: CPT

## 2020-05-21 PROCEDURE — 99213 OFFICE O/P EST LOW 20 MIN: CPT | Performed by: FAMILY MEDICINE

## 2020-05-21 PROCEDURE — 99285 EMERGENCY DEPT VISIT HI MDM: CPT

## 2020-05-21 PROCEDURE — 74018 RADEX ABDOMEN 1 VIEW: CPT

## 2020-05-21 PROCEDURE — 87040 BLOOD CULTURE FOR BACTERIA: CPT

## 2020-05-21 PROCEDURE — 80053 COMPREHEN METABOLIC PANEL: CPT

## 2020-05-21 PROCEDURE — 80048 BASIC METABOLIC PNL TOTAL CA: CPT | Performed by: PHYSICIAN ASSISTANT

## 2020-05-21 PROCEDURE — 71045 X-RAY EXAM CHEST 1 VIEW: CPT

## 2020-05-21 PROCEDURE — 96374 THER/PROPH/DIAG INJ IV PUSH: CPT

## 2020-05-21 RX ORDER — FOLIC ACID 1 MG/1
1 TABLET ORAL DAILY
Status: DISCONTINUED | OUTPATIENT
Start: 2020-05-21 | End: 2020-05-24 | Stop reason: HOSPADM

## 2020-05-21 RX ORDER — FUROSEMIDE 40 MG/1
40 TABLET ORAL 2 TIMES DAILY
Status: DISCONTINUED | OUTPATIENT
Start: 2020-05-21 | End: 2020-05-21

## 2020-05-21 RX ORDER — TRAZODONE HYDROCHLORIDE 150 MG/1
150 TABLET ORAL
Status: DISCONTINUED | OUTPATIENT
Start: 2020-05-21 | End: 2020-05-24 | Stop reason: HOSPADM

## 2020-05-21 RX ORDER — ONDANSETRON 2 MG/ML
4 INJECTION INTRAMUSCULAR; INTRAVENOUS EVERY 6 HOURS PRN
Status: DISCONTINUED | OUTPATIENT
Start: 2020-05-21 | End: 2020-05-24 | Stop reason: HOSPADM

## 2020-05-21 RX ORDER — INSULIN GLARGINE 100 [IU]/ML
10 INJECTION, SOLUTION SUBCUTANEOUS
Status: DISCONTINUED | OUTPATIENT
Start: 2020-05-21 | End: 2020-05-24

## 2020-05-21 RX ORDER — FUROSEMIDE 40 MG/1
40 TABLET ORAL 2 TIMES DAILY
Status: DISCONTINUED | OUTPATIENT
Start: 2020-05-22 | End: 2020-05-21

## 2020-05-21 RX ORDER — SODIUM CHLORIDE, SODIUM GLUCONATE, SODIUM ACETATE, POTASSIUM CHLORIDE, MAGNESIUM CHLORIDE, SODIUM PHOSPHATE, DIBASIC, AND POTASSIUM PHOSPHATE .53; .5; .37; .037; .03; .012; .00082 G/100ML; G/100ML; G/100ML; G/100ML; G/100ML; G/100ML; G/100ML
1000 INJECTION, SOLUTION INTRAVENOUS ONCE
Status: COMPLETED | OUTPATIENT
Start: 2020-05-21 | End: 2020-05-21

## 2020-05-21 RX ORDER — METHYLPREDNISOLONE SODIUM SUCCINATE 40 MG/ML
40 INJECTION, POWDER, LYOPHILIZED, FOR SOLUTION INTRAMUSCULAR; INTRAVENOUS EVERY 12 HOURS SCHEDULED
Status: DISCONTINUED | OUTPATIENT
Start: 2020-05-22 | End: 2020-05-24 | Stop reason: HOSPADM

## 2020-05-21 RX ORDER — LORAZEPAM 1 MG/1
1 TABLET ORAL
Status: DISCONTINUED | OUTPATIENT
Start: 2020-05-21 | End: 2020-05-24 | Stop reason: HOSPADM

## 2020-05-21 RX ORDER — AZITHROMYCIN 500 MG/1
500 TABLET, FILM COATED ORAL EVERY 24 HOURS
Status: DISCONTINUED | OUTPATIENT
Start: 2020-05-22 | End: 2020-05-24

## 2020-05-21 RX ORDER — GUAIFENESIN 600 MG
600 TABLET, EXTENDED RELEASE 12 HR ORAL 2 TIMES DAILY
Status: DISCONTINUED | OUTPATIENT
Start: 2020-05-21 | End: 2020-05-24 | Stop reason: HOSPADM

## 2020-05-21 RX ORDER — ALBUTEROL SULFATE 90 UG/1
2 AEROSOL, METERED RESPIRATORY (INHALATION) EVERY 6 HOURS PRN
Status: DISCONTINUED | OUTPATIENT
Start: 2020-05-21 | End: 2020-05-24 | Stop reason: HOSPADM

## 2020-05-21 RX ORDER — BUDESONIDE AND FORMOTEROL FUMARATE DIHYDRATE 160; 4.5 UG/1; UG/1
2 AEROSOL RESPIRATORY (INHALATION) 2 TIMES DAILY
Status: DISCONTINUED | OUTPATIENT
Start: 2020-05-21 | End: 2020-05-24 | Stop reason: HOSPADM

## 2020-05-21 RX ORDER — PANTOPRAZOLE SODIUM 40 MG/1
40 TABLET, DELAYED RELEASE ORAL
Status: DISCONTINUED | OUTPATIENT
Start: 2020-05-22 | End: 2020-05-24 | Stop reason: HOSPADM

## 2020-05-21 RX ORDER — ACETAMINOPHEN 325 MG/1
650 TABLET ORAL EVERY 6 HOURS PRN
Status: DISCONTINUED | OUTPATIENT
Start: 2020-05-21 | End: 2020-05-24 | Stop reason: HOSPADM

## 2020-05-21 RX ORDER — CEFTRIAXONE 1 G/50ML
1000 INJECTION, SOLUTION INTRAVENOUS EVERY 24 HOURS
Status: DISCONTINUED | OUTPATIENT
Start: 2020-05-22 | End: 2020-05-24

## 2020-05-21 RX ORDER — THIAMINE MONONITRATE (VIT B1) 100 MG
100 TABLET ORAL DAILY
Status: DISCONTINUED | OUTPATIENT
Start: 2020-05-21 | End: 2020-05-24 | Stop reason: HOSPADM

## 2020-05-21 RX ORDER — IPRATROPIUM BROMIDE AND ALBUTEROL SULFATE 2.5; .5 MG/3ML; MG/3ML
3 SOLUTION RESPIRATORY (INHALATION)
Status: DISCONTINUED | OUTPATIENT
Start: 2020-05-21 | End: 2020-05-21

## 2020-05-21 RX ORDER — LEVALBUTEROL 1.25 MG/.5ML
1.25 SOLUTION, CONCENTRATE RESPIRATORY (INHALATION)
Status: DISCONTINUED | OUTPATIENT
Start: 2020-05-22 | End: 2020-05-22

## 2020-05-21 RX ORDER — CEFTRIAXONE 1 G/50ML
1000 INJECTION, SOLUTION INTRAVENOUS ONCE
Status: COMPLETED | OUTPATIENT
Start: 2020-05-21 | End: 2020-05-21

## 2020-05-21 RX ORDER — POTASSIUM CHLORIDE 20 MEQ/1
20 TABLET, EXTENDED RELEASE ORAL 2 TIMES DAILY
Status: DISCONTINUED | OUTPATIENT
Start: 2020-05-21 | End: 2020-05-24 | Stop reason: HOSPADM

## 2020-05-21 RX ORDER — METHYLPREDNISOLONE SODIUM SUCCINATE 125 MG/2ML
125 INJECTION, POWDER, LYOPHILIZED, FOR SOLUTION INTRAMUSCULAR; INTRAVENOUS ONCE
Status: COMPLETED | OUTPATIENT
Start: 2020-05-21 | End: 2020-05-21

## 2020-05-21 RX ORDER — METOPROLOL SUCCINATE 50 MG/1
50 TABLET, EXTENDED RELEASE ORAL DAILY
Status: DISCONTINUED | OUTPATIENT
Start: 2020-05-22 | End: 2020-05-24 | Stop reason: HOSPADM

## 2020-05-21 RX ORDER — FLECAINIDE ACETATE 100 MG/1
100 TABLET ORAL EVERY 12 HOURS SCHEDULED
Status: DISCONTINUED | OUTPATIENT
Start: 2020-05-21 | End: 2020-05-24 | Stop reason: HOSPADM

## 2020-05-21 RX ADMIN — LORAZEPAM 1 MG: 1 TABLET ORAL at 20:54

## 2020-05-21 RX ADMIN — SODIUM CHLORIDE 1000 ML: 0.9 INJECTION, SOLUTION INTRAVENOUS at 18:13

## 2020-05-21 RX ADMIN — THIAMINE HCL TAB 100 MG 100 MG: 100 TAB at 15:52

## 2020-05-21 RX ADMIN — APIXABAN 5 MG: 5 TABLET, FILM COATED ORAL at 17:46

## 2020-05-21 RX ADMIN — GUAIFENESIN 600 MG: 600 TABLET, EXTENDED RELEASE ORAL at 17:46

## 2020-05-21 RX ADMIN — TRAZODONE HYDROCHLORIDE 150 MG: 150 TABLET ORAL at 21:47

## 2020-05-21 RX ADMIN — SODIUM CHLORIDE, SODIUM GLUCONATE, SODIUM ACETATE, POTASSIUM CHLORIDE, MAGNESIUM CHLORIDE, SODIUM PHOSPHATE, DIBASIC, AND POTASSIUM PHOSPHATE 1000 ML: .53; .5; .37; .037; .03; .012; .00082 INJECTION, SOLUTION INTRAVENOUS at 21:48

## 2020-05-21 RX ADMIN — CEFTRIAXONE 1000 MG: 1 INJECTION, SOLUTION INTRAVENOUS at 12:22

## 2020-05-21 RX ADMIN — AZITHROMYCIN 500 MG: 500 INJECTION, POWDER, LYOPHILIZED, FOR SOLUTION INTRAVENOUS at 12:49

## 2020-05-21 RX ADMIN — METHYLPREDNISOLONE SODIUM SUCCINATE 125 MG: 125 INJECTION, POWDER, FOR SOLUTION INTRAMUSCULAR; INTRAVENOUS at 15:52

## 2020-05-21 RX ADMIN — IPRATROPIUM BROMIDE AND ALBUTEROL SULFATE 3 ML: 2.5; .5 SOLUTION RESPIRATORY (INHALATION) at 15:12

## 2020-05-21 RX ADMIN — FOLIC ACID 1 MG: 1 TABLET ORAL at 15:52

## 2020-05-21 RX ADMIN — INSULIN GLARGINE 10 UNITS: 100 INJECTION, SOLUTION SUBCUTANEOUS at 21:47

## 2020-05-21 RX ADMIN — BUDESONIDE AND FORMOTEROL FUMARATE DIHYDRATE 2 PUFF: 160; 4.5 AEROSOL RESPIRATORY (INHALATION) at 17:56

## 2020-05-21 RX ADMIN — IPRATROPIUM BROMIDE AND ALBUTEROL SULFATE 3 ML: 2.5; .5 SOLUTION RESPIRATORY (INHALATION) at 19:15

## 2020-05-21 RX ADMIN — SODIUM CHLORIDE 1000 ML: 0.9 INJECTION, SOLUTION INTRAVENOUS at 12:26

## 2020-05-21 RX ADMIN — FLECAINIDE ACETATE 100 MG: 100 TABLET ORAL at 20:50

## 2020-05-21 RX ADMIN — POTASSIUM CHLORIDE 20 MEQ: 1500 TABLET, EXTENDED RELEASE ORAL at 17:46

## 2020-05-21 RX ADMIN — INSULIN LISPRO 8 UNITS: 100 INJECTION, SOLUTION INTRAVENOUS; SUBCUTANEOUS at 21:48

## 2020-05-22 ENCOUNTER — APPOINTMENT (INPATIENT)
Dept: NON INVASIVE DIAGNOSTICS | Facility: HOSPITAL | Age: 60
DRG: 133 | End: 2020-05-22
Payer: COMMERCIAL

## 2020-05-22 ENCOUNTER — APPOINTMENT (INPATIENT)
Dept: CT IMAGING | Facility: HOSPITAL | Age: 60
DRG: 133 | End: 2020-05-22
Payer: COMMERCIAL

## 2020-05-22 PROBLEM — E87.20 LACTIC ACIDOSIS: Status: ACTIVE | Noted: 2020-05-22

## 2020-05-22 PROBLEM — E87.2 LACTIC ACIDOSIS: Status: ACTIVE | Noted: 2020-05-22

## 2020-05-22 LAB
ANION GAP SERPL CALCULATED.3IONS-SCNC: 12 MMOL/L (ref 4–13)
ANION GAP SERPL CALCULATED.3IONS-SCNC: 9 MMOL/L (ref 4–13)
BASOPHILS # BLD AUTO: 0.02 THOUSANDS/ΜL (ref 0–0.1)
BASOPHILS NFR BLD AUTO: 0 % (ref 0–1)
BUN SERPL-MCNC: 11 MG/DL (ref 5–25)
BUN SERPL-MCNC: 14 MG/DL (ref 5–25)
CALCIUM SERPL-MCNC: 8 MG/DL (ref 8.3–10.1)
CALCIUM SERPL-MCNC: 8.1 MG/DL (ref 8.3–10.1)
CHLORIDE SERPL-SCNC: 94 MMOL/L (ref 100–108)
CHLORIDE SERPL-SCNC: 94 MMOL/L (ref 100–108)
CO2 SERPL-SCNC: 25 MMOL/L (ref 21–32)
CO2 SERPL-SCNC: 28 MMOL/L (ref 21–32)
CREAT SERPL-MCNC: 0.98 MG/DL (ref 0.6–1.3)
CREAT SERPL-MCNC: 1.22 MG/DL (ref 0.6–1.3)
EOSINOPHIL # BLD AUTO: 0 THOUSAND/ΜL (ref 0–0.61)
EOSINOPHIL NFR BLD AUTO: 0 % (ref 0–6)
ERYTHROCYTE [DISTWIDTH] IN BLOOD BY AUTOMATED COUNT: 16.8 % (ref 11.6–15.1)
FERRITIN SERPL-MCNC: 8 NG/ML (ref 8–388)
GFR SERPL CREATININE-BSD FRML MDRD: 48 ML/MIN/1.73SQ M
GFR SERPL CREATININE-BSD FRML MDRD: 62 ML/MIN/1.73SQ M
GLUCOSE SERPL-MCNC: 208 MG/DL (ref 65–140)
GLUCOSE SERPL-MCNC: 245 MG/DL (ref 65–140)
GLUCOSE SERPL-MCNC: 250 MG/DL (ref 65–140)
GLUCOSE SERPL-MCNC: 320 MG/DL (ref 65–140)
GLUCOSE SERPL-MCNC: 339 MG/DL (ref 65–140)
GLUCOSE SERPL-MCNC: 434 MG/DL (ref 65–140)
HCT VFR BLD AUTO: 33.8 % (ref 34.8–46.1)
HGB BLD-MCNC: 10.1 G/DL (ref 11.5–15.4)
IMM GRANULOCYTES # BLD AUTO: 0.1 THOUSAND/UL (ref 0–0.2)
IMM GRANULOCYTES NFR BLD AUTO: 1 % (ref 0–2)
IRON SATN MFR SERPL: 4 %
IRON SERPL-MCNC: 21 UG/DL (ref 50–170)
LACTATE SERPL-SCNC: 4 MMOL/L (ref 0.5–2)
LACTATE SERPL-SCNC: 4 MMOL/L (ref 0.5–2)
LACTATE SERPL-SCNC: 4.7 MMOL/L (ref 0.5–2)
LACTATE SERPL-SCNC: 5.1 MMOL/L (ref 0.5–2)
LACTATE SERPL-SCNC: 6.3 MMOL/L (ref 0.5–2)
LYMPHOCYTES # BLD AUTO: 0.72 THOUSANDS/ΜL (ref 0.6–4.47)
LYMPHOCYTES NFR BLD AUTO: 5 % (ref 14–44)
MCH RBC QN AUTO: 21.7 PG (ref 26.8–34.3)
MCHC RBC AUTO-ENTMCNC: 29.9 G/DL (ref 31.4–37.4)
MCV RBC AUTO: 73 FL (ref 82–98)
MONOCYTES # BLD AUTO: 0.24 THOUSAND/ΜL (ref 0.17–1.22)
MONOCYTES NFR BLD AUTO: 2 % (ref 4–12)
NEUTROPHILS # BLD AUTO: 13.53 THOUSANDS/ΜL (ref 1.85–7.62)
NEUTS SEG NFR BLD AUTO: 92 % (ref 43–75)
NRBC BLD AUTO-RTO: 0 /100 WBCS
PLATELET # BLD AUTO: 295 THOUSANDS/UL (ref 149–390)
PMV BLD AUTO: 10.7 FL (ref 8.9–12.7)
POTASSIUM SERPL-SCNC: 4.4 MMOL/L (ref 3.5–5.3)
POTASSIUM SERPL-SCNC: 4.6 MMOL/L (ref 3.5–5.3)
PROCALCITONIN SERPL-MCNC: <0.05 NG/ML
RBC # BLD AUTO: 4.65 MILLION/UL (ref 3.81–5.12)
SODIUM SERPL-SCNC: 131 MMOL/L (ref 136–145)
SODIUM SERPL-SCNC: 131 MMOL/L (ref 136–145)
TIBC SERPL-MCNC: 538 UG/DL (ref 250–450)
WBC # BLD AUTO: 14.61 THOUSAND/UL (ref 4.31–10.16)

## 2020-05-22 PROCEDURE — 83540 ASSAY OF IRON: CPT | Performed by: INTERNAL MEDICINE

## 2020-05-22 PROCEDURE — 83605 ASSAY OF LACTIC ACID: CPT | Performed by: PHYSICIAN ASSISTANT

## 2020-05-22 PROCEDURE — 94640 AIRWAY INHALATION TREATMENT: CPT

## 2020-05-22 PROCEDURE — 94760 N-INVAS EAR/PLS OXIMETRY 1: CPT

## 2020-05-22 PROCEDURE — 83550 IRON BINDING TEST: CPT | Performed by: INTERNAL MEDICINE

## 2020-05-22 PROCEDURE — 85025 COMPLETE CBC W/AUTO DIFF WBC: CPT | Performed by: INTERNAL MEDICINE

## 2020-05-22 PROCEDURE — 82728 ASSAY OF FERRITIN: CPT | Performed by: INTERNAL MEDICINE

## 2020-05-22 PROCEDURE — 99255 IP/OBS CONSLTJ NEW/EST HI 80: CPT | Performed by: INTERNAL MEDICINE

## 2020-05-22 PROCEDURE — 94660 CPAP INITIATION&MGMT: CPT

## 2020-05-22 PROCEDURE — 93306 TTE W/DOPPLER COMPLETE: CPT

## 2020-05-22 PROCEDURE — 74174 CTA ABD&PLVS W/CONTRAST: CPT

## 2020-05-22 PROCEDURE — 82948 REAGENT STRIP/BLOOD GLUCOSE: CPT

## 2020-05-22 PROCEDURE — 99232 SBSQ HOSP IP/OBS MODERATE 35: CPT | Performed by: INTERNAL MEDICINE

## 2020-05-22 PROCEDURE — 80048 BASIC METABOLIC PNL TOTAL CA: CPT | Performed by: INTERNAL MEDICINE

## 2020-05-22 PROCEDURE — 93306 TTE W/DOPPLER COMPLETE: CPT | Performed by: INTERNAL MEDICINE

## 2020-05-22 PROCEDURE — 84145 PROCALCITONIN (PCT): CPT | Performed by: INTERNAL MEDICINE

## 2020-05-22 RX ORDER — FUROSEMIDE 10 MG/ML
40 INJECTION INTRAMUSCULAR; INTRAVENOUS ONCE
Status: COMPLETED | OUTPATIENT
Start: 2020-05-22 | End: 2020-05-22

## 2020-05-22 RX ORDER — POLYVINYL ALCOHOL 14 MG/ML
1 SOLUTION/ DROPS OPHTHALMIC
Status: DISCONTINUED | OUTPATIENT
Start: 2020-05-22 | End: 2020-05-22

## 2020-05-22 RX ORDER — LEVALBUTEROL 1.25 MG/.5ML
1.25 SOLUTION, CONCENTRATE RESPIRATORY (INHALATION) EVERY 6 HOURS
Status: DISCONTINUED | OUTPATIENT
Start: 2020-05-22 | End: 2020-05-22

## 2020-05-22 RX ORDER — POLYETHYLENE GLYCOL 3350 17 G/17G
17 POWDER, FOR SOLUTION ORAL DAILY
Status: DISCONTINUED | OUTPATIENT
Start: 2020-05-22 | End: 2020-05-24 | Stop reason: HOSPADM

## 2020-05-22 RX ORDER — LEVALBUTEROL 1.25 MG/.5ML
1.25 SOLUTION, CONCENTRATE RESPIRATORY (INHALATION)
Status: DISCONTINUED | OUTPATIENT
Start: 2020-05-23 | End: 2020-05-24 | Stop reason: HOSPADM

## 2020-05-22 RX ORDER — FUROSEMIDE 40 MG/1
40 TABLET ORAL
Status: DISCONTINUED | OUTPATIENT
Start: 2020-05-22 | End: 2020-05-24 | Stop reason: HOSPADM

## 2020-05-22 RX ADMIN — POLYETHYLENE GLYCOL 3350 17 G: 17 POWDER, FOR SOLUTION ORAL at 13:37

## 2020-05-22 RX ADMIN — METHYLPREDNISOLONE SODIUM SUCCINATE 40 MG: 40 INJECTION, POWDER, FOR SOLUTION INTRAMUSCULAR; INTRAVENOUS at 20:44

## 2020-05-22 RX ADMIN — AZITHROMYCIN 500 MG: 500 TABLET, FILM COATED ORAL at 13:38

## 2020-05-22 RX ADMIN — LEVALBUTEROL HYDROCHLORIDE 1.25 MG: 1.25 SOLUTION, CONCENTRATE RESPIRATORY (INHALATION) at 21:11

## 2020-05-22 RX ADMIN — FLECAINIDE ACETATE 100 MG: 100 TABLET ORAL at 09:35

## 2020-05-22 RX ADMIN — INSULIN LISPRO 5 UNITS: 100 INJECTION, SOLUTION INTRAVENOUS; SUBCUTANEOUS at 17:05

## 2020-05-22 RX ADMIN — INSULIN LISPRO 5 UNITS: 100 INJECTION, SOLUTION INTRAVENOUS; SUBCUTANEOUS at 07:45

## 2020-05-22 RX ADMIN — LEVALBUTEROL HYDROCHLORIDE 1.25 MG: 1.25 SOLUTION, CONCENTRATE RESPIRATORY (INHALATION) at 13:50

## 2020-05-22 RX ADMIN — DEXTRAN 70 AND HYPROMELLOSE 2910 1 DROP: 1; 3 SOLUTION/ DROPS OPHTHALMIC at 20:42

## 2020-05-22 RX ADMIN — IOHEXOL 100 ML: 350 INJECTION, SOLUTION INTRAVENOUS at 02:59

## 2020-05-22 RX ADMIN — CEFTRIAXONE 1000 MG: 1 INJECTION, SOLUTION INTRAVENOUS at 13:37

## 2020-05-22 RX ADMIN — TRAZODONE HYDROCHLORIDE 150 MG: 150 TABLET ORAL at 21:06

## 2020-05-22 RX ADMIN — GUAIFENESIN 600 MG: 600 TABLET, EXTENDED RELEASE ORAL at 09:31

## 2020-05-22 RX ADMIN — INSULIN LISPRO 8 UNITS: 100 INJECTION, SOLUTION INTRAVENOUS; SUBCUTANEOUS at 17:04

## 2020-05-22 RX ADMIN — APIXABAN 5 MG: 5 TABLET, FILM COATED ORAL at 09:31

## 2020-05-22 RX ADMIN — METOPROLOL SUCCINATE 50 MG: 50 TABLET, EXTENDED RELEASE ORAL at 09:31

## 2020-05-22 RX ADMIN — IPRATROPIUM BROMIDE 0.5 MG: 0.5 SOLUTION RESPIRATORY (INHALATION) at 07:12

## 2020-05-22 RX ADMIN — BUDESONIDE AND FORMOTEROL FUMARATE DIHYDRATE 2 PUFF: 160; 4.5 AEROSOL RESPIRATORY (INHALATION) at 09:32

## 2020-05-22 RX ADMIN — GUAIFENESIN 600 MG: 600 TABLET, EXTENDED RELEASE ORAL at 17:04

## 2020-05-22 RX ADMIN — FUROSEMIDE 40 MG: 10 INJECTION, SOLUTION INTRAMUSCULAR; INTRAVENOUS at 13:37

## 2020-05-22 RX ADMIN — FLECAINIDE ACETATE 100 MG: 100 TABLET ORAL at 21:05

## 2020-05-22 RX ADMIN — IPRATROPIUM BROMIDE 0.5 MG: 0.5 SOLUTION RESPIRATORY (INHALATION) at 13:50

## 2020-05-22 RX ADMIN — BUDESONIDE AND FORMOTEROL FUMARATE DIHYDRATE 2 PUFF: 160; 4.5 AEROSOL RESPIRATORY (INHALATION) at 17:07

## 2020-05-22 RX ADMIN — FUROSEMIDE 40 MG: 40 TABLET ORAL at 20:47

## 2020-05-22 RX ADMIN — INSULIN GLARGINE 10 UNITS: 100 INJECTION, SOLUTION SUBCUTANEOUS at 21:07

## 2020-05-22 RX ADMIN — LORAZEPAM 1 MG: 1 TABLET ORAL at 21:06

## 2020-05-22 RX ADMIN — INSULIN LISPRO 6 UNITS: 100 INJECTION, SOLUTION INTRAVENOUS; SUBCUTANEOUS at 07:45

## 2020-05-22 RX ADMIN — POTASSIUM CHLORIDE 20 MEQ: 1500 TABLET, EXTENDED RELEASE ORAL at 09:31

## 2020-05-22 RX ADMIN — INSULIN LISPRO 5 UNITS: 100 INJECTION, SOLUTION INTRAVENOUS; SUBCUTANEOUS at 13:39

## 2020-05-22 RX ADMIN — THIAMINE HCL TAB 100 MG 100 MG: 100 TAB at 09:31

## 2020-05-22 RX ADMIN — IPRATROPIUM BROMIDE 0.5 MG: 0.5 SOLUTION RESPIRATORY (INHALATION) at 21:12

## 2020-05-22 RX ADMIN — APIXABAN 5 MG: 5 TABLET, FILM COATED ORAL at 17:04

## 2020-05-22 RX ADMIN — INSULIN LISPRO 4 UNITS: 100 INJECTION, SOLUTION INTRAVENOUS; SUBCUTANEOUS at 21:44

## 2020-05-22 RX ADMIN — LEVALBUTEROL HYDROCHLORIDE 1.25 MG: 1.25 SOLUTION, CONCENTRATE RESPIRATORY (INHALATION) at 07:12

## 2020-05-22 RX ADMIN — METHYLPREDNISOLONE SODIUM SUCCINATE 40 MG: 40 INJECTION, POWDER, FOR SOLUTION INTRAMUSCULAR; INTRAVENOUS at 09:31

## 2020-05-22 RX ADMIN — INSULIN LISPRO 4 UNITS: 100 INJECTION, SOLUTION INTRAVENOUS; SUBCUTANEOUS at 13:38

## 2020-05-22 RX ADMIN — FOLIC ACID 1 MG: 1 TABLET ORAL at 09:31

## 2020-05-22 RX ADMIN — POTASSIUM CHLORIDE 20 MEQ: 1500 TABLET, EXTENDED RELEASE ORAL at 17:04

## 2020-05-22 RX ADMIN — PANTOPRAZOLE SODIUM 40 MG: 40 TABLET, DELAYED RELEASE ORAL at 05:49

## 2020-05-23 LAB
ANION GAP SERPL CALCULATED.3IONS-SCNC: 10 MMOL/L (ref 4–13)
BACTERIA SPT RESP CULT: ABNORMAL
BACTERIA SPT RESP CULT: ABNORMAL
BASOPHILS # BLD AUTO: 0.01 THOUSANDS/ΜL (ref 0–0.1)
BASOPHILS NFR BLD AUTO: 0 % (ref 0–1)
BUN SERPL-MCNC: 17 MG/DL (ref 5–25)
CALCIUM SERPL-MCNC: 8.3 MG/DL (ref 8.3–10.1)
CHLORIDE SERPL-SCNC: 94 MMOL/L (ref 100–108)
CO2 SERPL-SCNC: 31 MMOL/L (ref 21–32)
CREAT SERPL-MCNC: 1 MG/DL (ref 0.6–1.3)
EOSINOPHIL # BLD AUTO: 0 THOUSAND/ΜL (ref 0–0.61)
EOSINOPHIL NFR BLD AUTO: 0 % (ref 0–6)
ERYTHROCYTE [DISTWIDTH] IN BLOOD BY AUTOMATED COUNT: 16.8 % (ref 11.6–15.1)
GFR SERPL CREATININE-BSD FRML MDRD: 61 ML/MIN/1.73SQ M
GLUCOSE SERPL-MCNC: 246 MG/DL (ref 65–140)
GLUCOSE SERPL-MCNC: 253 MG/DL (ref 65–140)
GLUCOSE SERPL-MCNC: 278 MG/DL (ref 65–140)
GLUCOSE SERPL-MCNC: 325 MG/DL (ref 65–140)
GLUCOSE SERPL-MCNC: 408 MG/DL (ref 65–140)
GRAM STN SPEC: ABNORMAL
HCT VFR BLD AUTO: 33.7 % (ref 34.8–46.1)
HGB BLD-MCNC: 10.2 G/DL (ref 11.5–15.4)
IMM GRANULOCYTES # BLD AUTO: 0.09 THOUSAND/UL (ref 0–0.2)
IMM GRANULOCYTES NFR BLD AUTO: 1 % (ref 0–2)
LYMPHOCYTES # BLD AUTO: 0.65 THOUSANDS/ΜL (ref 0.6–4.47)
LYMPHOCYTES NFR BLD AUTO: 5 % (ref 14–44)
MCH RBC QN AUTO: 22 PG (ref 26.8–34.3)
MCHC RBC AUTO-ENTMCNC: 30.3 G/DL (ref 31.4–37.4)
MCV RBC AUTO: 73 FL (ref 82–98)
MONOCYTES # BLD AUTO: 0.41 THOUSAND/ΜL (ref 0.17–1.22)
MONOCYTES NFR BLD AUTO: 3 % (ref 4–12)
NEUTROPHILS # BLD AUTO: 13.28 THOUSANDS/ΜL (ref 1.85–7.62)
NEUTS SEG NFR BLD AUTO: 91 % (ref 43–75)
NRBC BLD AUTO-RTO: 0 /100 WBCS
PLATELET # BLD AUTO: 318 THOUSANDS/UL (ref 149–390)
PMV BLD AUTO: 11.1 FL (ref 8.9–12.7)
POTASSIUM SERPL-SCNC: 4.1 MMOL/L (ref 3.5–5.3)
RBC # BLD AUTO: 4.64 MILLION/UL (ref 3.81–5.12)
SODIUM SERPL-SCNC: 135 MMOL/L (ref 136–145)
WBC # BLD AUTO: 14.44 THOUSAND/UL (ref 4.31–10.16)

## 2020-05-23 PROCEDURE — 94640 AIRWAY INHALATION TREATMENT: CPT

## 2020-05-23 PROCEDURE — 94660 CPAP INITIATION&MGMT: CPT

## 2020-05-23 PROCEDURE — 94760 N-INVAS EAR/PLS OXIMETRY 1: CPT

## 2020-05-23 PROCEDURE — 99232 SBSQ HOSP IP/OBS MODERATE 35: CPT | Performed by: INTERNAL MEDICINE

## 2020-05-23 PROCEDURE — 80048 BASIC METABOLIC PNL TOTAL CA: CPT | Performed by: PHYSICIAN ASSISTANT

## 2020-05-23 PROCEDURE — 82948 REAGENT STRIP/BLOOD GLUCOSE: CPT

## 2020-05-23 PROCEDURE — 85025 COMPLETE CBC W/AUTO DIFF WBC: CPT | Performed by: PHYSICIAN ASSISTANT

## 2020-05-23 RX ADMIN — AZITHROMYCIN 500 MG: 500 TABLET, FILM COATED ORAL at 14:04

## 2020-05-23 RX ADMIN — DEXTRAN 70 AND HYPROMELLOSE 2910 1 DROP: 1; 3 SOLUTION/ DROPS OPHTHALMIC at 03:51

## 2020-05-23 RX ADMIN — POTASSIUM CHLORIDE 20 MEQ: 1500 TABLET, EXTENDED RELEASE ORAL at 08:36

## 2020-05-23 RX ADMIN — BUDESONIDE AND FORMOTEROL FUMARATE DIHYDRATE 2 PUFF: 160; 4.5 AEROSOL RESPIRATORY (INHALATION) at 17:42

## 2020-05-23 RX ADMIN — POTASSIUM CHLORIDE 20 MEQ: 1500 TABLET, EXTENDED RELEASE ORAL at 17:41

## 2020-05-23 RX ADMIN — INSULIN LISPRO 12 UNITS: 100 INJECTION, SOLUTION INTRAVENOUS; SUBCUTANEOUS at 17:42

## 2020-05-23 RX ADMIN — FLECAINIDE ACETATE 100 MG: 100 TABLET ORAL at 08:57

## 2020-05-23 RX ADMIN — THIAMINE HCL TAB 100 MG 100 MG: 100 TAB at 08:35

## 2020-05-23 RX ADMIN — LEVALBUTEROL HYDROCHLORIDE 1.25 MG: 1.25 SOLUTION, CONCENTRATE RESPIRATORY (INHALATION) at 08:49

## 2020-05-23 RX ADMIN — INSULIN LISPRO 5 UNITS: 100 INJECTION, SOLUTION INTRAVENOUS; SUBCUTANEOUS at 12:40

## 2020-05-23 RX ADMIN — FUROSEMIDE 40 MG: 40 TABLET ORAL at 08:36

## 2020-05-23 RX ADMIN — GUAIFENESIN 600 MG: 600 TABLET, EXTENDED RELEASE ORAL at 17:42

## 2020-05-23 RX ADMIN — POLYETHYLENE GLYCOL 3350 17 G: 17 POWDER, FOR SOLUTION ORAL at 08:36

## 2020-05-23 RX ADMIN — DEXTRAN 70 AND HYPROMELLOSE 2910 1 DROP: 1; 3 SOLUTION/ DROPS OPHTHALMIC at 06:58

## 2020-05-23 RX ADMIN — IPRATROPIUM BROMIDE 0.5 MG: 0.5 SOLUTION RESPIRATORY (INHALATION) at 19:58

## 2020-05-23 RX ADMIN — METOPROLOL SUCCINATE 50 MG: 50 TABLET, EXTENDED RELEASE ORAL at 08:35

## 2020-05-23 RX ADMIN — INSULIN LISPRO 6 UNITS: 100 INJECTION, SOLUTION INTRAVENOUS; SUBCUTANEOUS at 08:34

## 2020-05-23 RX ADMIN — CEFTRIAXONE 1000 MG: 1 INJECTION, SOLUTION INTRAVENOUS at 14:05

## 2020-05-23 RX ADMIN — INSULIN LISPRO 4 UNITS: 100 INJECTION, SOLUTION INTRAVENOUS; SUBCUTANEOUS at 12:39

## 2020-05-23 RX ADMIN — LEVALBUTEROL HYDROCHLORIDE 1.25 MG: 1.25 SOLUTION, CONCENTRATE RESPIRATORY (INHALATION) at 19:58

## 2020-05-23 RX ADMIN — METHYLPREDNISOLONE SODIUM SUCCINATE 40 MG: 40 INJECTION, POWDER, FOR SOLUTION INTRAMUSCULAR; INTRAVENOUS at 21:16

## 2020-05-23 RX ADMIN — GUAIFENESIN 600 MG: 600 TABLET, EXTENDED RELEASE ORAL at 08:35

## 2020-05-23 RX ADMIN — INSULIN LISPRO 5 UNITS: 100 INJECTION, SOLUTION INTRAVENOUS; SUBCUTANEOUS at 17:42

## 2020-05-23 RX ADMIN — IPRATROPIUM BROMIDE 0.5 MG: 0.5 SOLUTION RESPIRATORY (INHALATION) at 14:42

## 2020-05-23 RX ADMIN — FLECAINIDE ACETATE 100 MG: 100 TABLET ORAL at 21:15

## 2020-05-23 RX ADMIN — INSULIN LISPRO 5 UNITS: 100 INJECTION, SOLUTION INTRAVENOUS; SUBCUTANEOUS at 08:34

## 2020-05-23 RX ADMIN — BUDESONIDE AND FORMOTEROL FUMARATE DIHYDRATE 2 PUFF: 160; 4.5 AEROSOL RESPIRATORY (INHALATION) at 08:42

## 2020-05-23 RX ADMIN — INSULIN GLARGINE 10 UNITS: 100 INJECTION, SOLUTION SUBCUTANEOUS at 21:16

## 2020-05-23 RX ADMIN — APIXABAN 5 MG: 5 TABLET, FILM COATED ORAL at 17:42

## 2020-05-23 RX ADMIN — PANTOPRAZOLE SODIUM 40 MG: 40 TABLET, DELAYED RELEASE ORAL at 06:26

## 2020-05-23 RX ADMIN — FUROSEMIDE 40 MG: 40 TABLET ORAL at 17:42

## 2020-05-23 RX ADMIN — INSULIN LISPRO 6 UNITS: 100 INJECTION, SOLUTION INTRAVENOUS; SUBCUTANEOUS at 21:16

## 2020-05-23 RX ADMIN — LORAZEPAM 1 MG: 1 TABLET ORAL at 21:15

## 2020-05-23 RX ADMIN — IPRATROPIUM BROMIDE 0.5 MG: 0.5 SOLUTION RESPIRATORY (INHALATION) at 08:49

## 2020-05-23 RX ADMIN — TRAZODONE HYDROCHLORIDE 150 MG: 150 TABLET ORAL at 21:15

## 2020-05-23 RX ADMIN — APIXABAN 5 MG: 5 TABLET, FILM COATED ORAL at 08:35

## 2020-05-23 RX ADMIN — FOLIC ACID 1 MG: 1 TABLET ORAL at 08:35

## 2020-05-23 RX ADMIN — METHYLPREDNISOLONE SODIUM SUCCINATE 40 MG: 40 INJECTION, POWDER, FOR SOLUTION INTRAMUSCULAR; INTRAVENOUS at 08:36

## 2020-05-23 RX ADMIN — LEVALBUTEROL HYDROCHLORIDE 1.25 MG: 1.25 SOLUTION, CONCENTRATE RESPIRATORY (INHALATION) at 14:42

## 2020-05-24 VITALS
WEIGHT: 272 LBS | SYSTOLIC BLOOD PRESSURE: 128 MMHG | HEIGHT: 63 IN | TEMPERATURE: 97.6 F | OXYGEN SATURATION: 93 % | HEART RATE: 72 BPM | BODY MASS INDEX: 48.2 KG/M2 | DIASTOLIC BLOOD PRESSURE: 75 MMHG | RESPIRATION RATE: 20 BRPM

## 2020-05-24 PROBLEM — I50.31 ACUTE DIASTOLIC CONGESTIVE HEART FAILURE (HCC): Status: ACTIVE | Noted: 2020-05-21

## 2020-05-24 LAB — GLUCOSE SERPL-MCNC: 289 MG/DL (ref 65–140)

## 2020-05-24 PROCEDURE — 99239 HOSP IP/OBS DSCHRG MGMT >30: CPT | Performed by: INTERNAL MEDICINE

## 2020-05-24 PROCEDURE — 94660 CPAP INITIATION&MGMT: CPT

## 2020-05-24 PROCEDURE — 94640 AIRWAY INHALATION TREATMENT: CPT

## 2020-05-24 PROCEDURE — 94761 N-INVAS EAR/PLS OXIMETRY MLT: CPT

## 2020-05-24 PROCEDURE — 82948 REAGENT STRIP/BLOOD GLUCOSE: CPT

## 2020-05-24 PROCEDURE — 94760 N-INVAS EAR/PLS OXIMETRY 1: CPT

## 2020-05-24 RX ORDER — PREDNISONE 10 MG/1
TABLET ORAL
Qty: 45 TABLET | Refills: 0 | Status: SHIPPED | OUTPATIENT
Start: 2020-05-24 | End: 2020-06-08

## 2020-05-24 RX ORDER — FERROUS SULFATE TAB EC 324 MG (65 MG FE EQUIVALENT) 324 (65 FE) MG
324 TABLET DELAYED RESPONSE ORAL
Qty: 30 TABLET | Refills: 0 | Status: SHIPPED | OUTPATIENT
Start: 2020-05-24 | End: 2020-06-26 | Stop reason: SDUPTHER

## 2020-05-24 RX ORDER — INSULIN GLARGINE 100 [IU]/ML
15 INJECTION, SOLUTION SUBCUTANEOUS
Status: DISCONTINUED | OUTPATIENT
Start: 2020-05-24 | End: 2020-05-24 | Stop reason: HOSPADM

## 2020-05-24 RX ADMIN — APIXABAN 5 MG: 5 TABLET, FILM COATED ORAL at 08:08

## 2020-05-24 RX ADMIN — GUAIFENESIN 600 MG: 600 TABLET, EXTENDED RELEASE ORAL at 08:08

## 2020-05-24 RX ADMIN — FLECAINIDE ACETATE 100 MG: 100 TABLET ORAL at 08:08

## 2020-05-24 RX ADMIN — POLYETHYLENE GLYCOL 3350 17 G: 17 POWDER, FOR SOLUTION ORAL at 08:07

## 2020-05-24 RX ADMIN — FOLIC ACID 1 MG: 1 TABLET ORAL at 08:07

## 2020-05-24 RX ADMIN — INSULIN LISPRO 5 UNITS: 100 INJECTION, SOLUTION INTRAVENOUS; SUBCUTANEOUS at 08:09

## 2020-05-24 RX ADMIN — BUDESONIDE AND FORMOTEROL FUMARATE DIHYDRATE 2 PUFF: 160; 4.5 AEROSOL RESPIRATORY (INHALATION) at 08:08

## 2020-05-24 RX ADMIN — PANTOPRAZOLE SODIUM 40 MG: 40 TABLET, DELAYED RELEASE ORAL at 04:48

## 2020-05-24 RX ADMIN — LEVALBUTEROL HYDROCHLORIDE 1.25 MG: 1.25 SOLUTION, CONCENTRATE RESPIRATORY (INHALATION) at 08:33

## 2020-05-24 RX ADMIN — THIAMINE HCL TAB 100 MG 100 MG: 100 TAB at 08:08

## 2020-05-24 RX ADMIN — POTASSIUM CHLORIDE 20 MEQ: 1500 TABLET, EXTENDED RELEASE ORAL at 08:07

## 2020-05-24 RX ADMIN — METHYLPREDNISOLONE SODIUM SUCCINATE 40 MG: 40 INJECTION, POWDER, FOR SOLUTION INTRAMUSCULAR; INTRAVENOUS at 08:07

## 2020-05-24 RX ADMIN — IPRATROPIUM BROMIDE 0.5 MG: 0.5 SOLUTION RESPIRATORY (INHALATION) at 08:33

## 2020-05-24 RX ADMIN — INSULIN LISPRO 6 UNITS: 100 INJECTION, SOLUTION INTRAVENOUS; SUBCUTANEOUS at 08:08

## 2020-05-24 RX ADMIN — METOPROLOL SUCCINATE 50 MG: 50 TABLET, EXTENDED RELEASE ORAL at 08:08

## 2020-05-24 RX ADMIN — FUROSEMIDE 40 MG: 40 TABLET ORAL at 08:08

## 2020-05-26 ENCOUNTER — TRANSITIONAL CARE MANAGEMENT (OUTPATIENT)
Dept: FAMILY MEDICINE CLINIC | Facility: HOSPITAL | Age: 60
End: 2020-05-26

## 2020-05-26 DIAGNOSIS — G47.00 INSOMNIA, UNSPECIFIED TYPE: ICD-10-CM

## 2020-05-26 DIAGNOSIS — Z79.899 ENCOUNTER FOR LONG-TERM (CURRENT) DRUG USE: ICD-10-CM

## 2020-05-26 LAB
BACTERIA BLD CULT: NORMAL
BACTERIA BLD CULT: NORMAL

## 2020-05-27 ENCOUNTER — TELEPHONE (OUTPATIENT)
Dept: ADMINISTRATIVE | Facility: OTHER | Age: 60
End: 2020-05-27

## 2020-05-27 RX ORDER — TRAZODONE HYDROCHLORIDE 150 MG/1
150 TABLET ORAL
Qty: 30 TABLET | Refills: 0 | Status: SHIPPED | OUTPATIENT
Start: 2020-05-27 | End: 2020-06-23 | Stop reason: SDUPTHER

## 2020-05-27 RX ORDER — LORAZEPAM 0.5 MG/1
TABLET ORAL
Qty: 90 TABLET | Refills: 0 | Status: SHIPPED | OUTPATIENT
Start: 2020-05-27 | End: 2020-06-23 | Stop reason: SDUPTHER

## 2020-05-29 PROBLEM — A41.9 SEPSIS (HCC): Status: ACTIVE | Noted: 2020-05-29

## 2020-06-01 ENCOUNTER — TELEMEDICINE (OUTPATIENT)
Dept: FAMILY MEDICINE CLINIC | Facility: HOSPITAL | Age: 60
End: 2020-06-01
Payer: COMMERCIAL

## 2020-06-01 VITALS — BODY MASS INDEX: 47.84 KG/M2 | WEIGHT: 270 LBS | TEMPERATURE: 96.3 F | HEIGHT: 63 IN

## 2020-06-01 DIAGNOSIS — E11.65 TYPE 2 DIABETES MELLITUS WITH HYPERGLYCEMIA, WITHOUT LONG-TERM CURRENT USE OF INSULIN (HCC): ICD-10-CM

## 2020-06-01 DIAGNOSIS — J45.40 MODERATE PERSISTENT ASTHMA WITHOUT COMPLICATION: Primary | ICD-10-CM

## 2020-06-01 DIAGNOSIS — I50.31 ACUTE DIASTOLIC CONGESTIVE HEART FAILURE (HCC): ICD-10-CM

## 2020-06-01 DIAGNOSIS — J18.9 PNEUMONIA DUE TO INFECTIOUS ORGANISM, UNSPECIFIED LATERALITY, UNSPECIFIED PART OF LUNG: ICD-10-CM

## 2020-06-01 DIAGNOSIS — Z12.11 SCREENING FOR COLON CANCER: ICD-10-CM

## 2020-06-01 DIAGNOSIS — Z12.31 ENCOUNTER FOR SCREENING MAMMOGRAM FOR MALIGNANT NEOPLASM OF BREAST: ICD-10-CM

## 2020-06-01 DIAGNOSIS — F17.210 CIGARETTE NICOTINE DEPENDENCE WITHOUT COMPLICATION: ICD-10-CM

## 2020-06-01 DIAGNOSIS — J44.9 CHRONIC OBSTRUCTIVE PULMONARY DISEASE, UNSPECIFIED COPD TYPE (HCC): ICD-10-CM

## 2020-06-01 PROCEDURE — 1111F DSCHRG MED/CURRENT MED MERGE: CPT | Performed by: FAMILY MEDICINE

## 2020-06-01 PROCEDURE — 99214 OFFICE O/P EST MOD 30 MIN: CPT | Performed by: FAMILY MEDICINE

## 2020-06-01 RX ORDER — GLIMEPIRIDE 4 MG/1
TABLET ORAL
Qty: 30 TABLET | Refills: 5 | Status: SHIPPED | OUTPATIENT
Start: 2020-06-01 | End: 2020-12-09

## 2020-06-23 DIAGNOSIS — G47.00 INSOMNIA, UNSPECIFIED TYPE: ICD-10-CM

## 2020-06-23 DIAGNOSIS — Z79.899 ENCOUNTER FOR LONG-TERM (CURRENT) DRUG USE: ICD-10-CM

## 2020-06-24 RX ORDER — TRAZODONE HYDROCHLORIDE 150 MG/1
150 TABLET ORAL
Qty: 30 TABLET | Refills: 0 | Status: SHIPPED | OUTPATIENT
Start: 2020-06-24 | End: 2020-07-27 | Stop reason: SDUPTHER

## 2020-06-24 RX ORDER — LORAZEPAM 0.5 MG/1
TABLET ORAL
Qty: 90 TABLET | Refills: 0 | Status: SHIPPED | OUTPATIENT
Start: 2020-06-24 | End: 2020-07-27 | Stop reason: SDUPTHER

## 2020-06-26 DIAGNOSIS — D50.9 MICROCYTIC ANEMIA: ICD-10-CM

## 2020-06-26 RX ORDER — FERROUS SULFATE TAB EC 324 MG (65 MG FE EQUIVALENT) 324 (65 FE) MG
324 TABLET DELAYED RESPONSE ORAL
Qty: 30 TABLET | Refills: 0 | Status: SHIPPED | OUTPATIENT
Start: 2020-06-26 | End: 2020-07-27 | Stop reason: SDUPTHER

## 2020-07-27 DIAGNOSIS — D50.9 MICROCYTIC ANEMIA: ICD-10-CM

## 2020-07-27 DIAGNOSIS — G47.00 INSOMNIA, UNSPECIFIED TYPE: ICD-10-CM

## 2020-07-27 DIAGNOSIS — Z79.899 ENCOUNTER FOR LONG-TERM (CURRENT) DRUG USE: ICD-10-CM

## 2020-07-29 DIAGNOSIS — D50.9 MICROCYTIC ANEMIA: ICD-10-CM

## 2020-07-29 DIAGNOSIS — G47.00 INSOMNIA, UNSPECIFIED TYPE: ICD-10-CM

## 2020-07-29 DIAGNOSIS — G89.29 OTHER CHRONIC PAIN: Primary | ICD-10-CM

## 2020-07-29 DIAGNOSIS — Z79.899 ENCOUNTER FOR LONG-TERM (CURRENT) DRUG USE: ICD-10-CM

## 2020-07-29 RX ORDER — LORAZEPAM 0.5 MG/1
TABLET ORAL
Qty: 90 TABLET | Refills: 0 | OUTPATIENT
Start: 2020-07-29

## 2020-07-29 RX ORDER — TRAZODONE HYDROCHLORIDE 150 MG/1
TABLET ORAL
Qty: 30 TABLET | Refills: 0 | OUTPATIENT
Start: 2020-07-29

## 2020-07-29 RX ORDER — FERROUS SULFATE TAB EC 324 MG (65 MG FE EQUIVALENT) 324 (65 FE) MG
324 TABLET DELAYED RESPONSE ORAL
Qty: 30 TABLET | Refills: 0 | Status: SHIPPED | OUTPATIENT
Start: 2020-07-29 | End: 2020-08-20 | Stop reason: SDUPTHER

## 2020-07-29 RX ORDER — TRAZODONE HYDROCHLORIDE 150 MG/1
150 TABLET ORAL
Qty: 30 TABLET | Refills: 0 | Status: SHIPPED | OUTPATIENT
Start: 2020-07-29 | End: 2020-08-20 | Stop reason: SDUPTHER

## 2020-07-29 RX ORDER — LORAZEPAM 0.5 MG/1
TABLET ORAL
Qty: 90 TABLET | Refills: 0 | Status: SHIPPED | OUTPATIENT
Start: 2020-07-29 | End: 2020-08-20 | Stop reason: SDUPTHER

## 2020-07-29 RX ORDER — NALOXONE HYDROCHLORIDE 4 MG/.1ML
SPRAY NASAL
Qty: 1 EACH | Refills: 1 | Status: SHIPPED | OUTPATIENT
Start: 2020-07-29

## 2020-07-29 RX ORDER — FERROUS SULFATE TAB EC 324 MG (65 MG FE EQUIVALENT) 324 (65 FE) MG
TABLET DELAYED RESPONSE ORAL
Qty: 30 TABLET | Refills: 0 | OUTPATIENT
Start: 2020-07-29

## 2020-08-07 ENCOUNTER — OFFICE VISIT (OUTPATIENT)
Dept: PULMONOLOGY | Facility: CLINIC | Age: 60
End: 2020-08-07
Payer: COMMERCIAL

## 2020-08-07 VITALS
BODY MASS INDEX: 48.83 KG/M2 | WEIGHT: 275.6 LBS | SYSTOLIC BLOOD PRESSURE: 130 MMHG | HEIGHT: 63 IN | HEART RATE: 80 BPM | DIASTOLIC BLOOD PRESSURE: 72 MMHG | OXYGEN SATURATION: 96 % | TEMPERATURE: 98 F

## 2020-08-07 DIAGNOSIS — J45.41 MODERATE PERSISTENT ASTHMA WITH EXACERBATION: Primary | ICD-10-CM

## 2020-08-07 DIAGNOSIS — J44.9 ASTHMA-COPD OVERLAP SYNDROME (HCC): ICD-10-CM

## 2020-08-07 DIAGNOSIS — G47.33 OBSTRUCTIVE SLEEP APNEA: ICD-10-CM

## 2020-08-07 PROCEDURE — 3075F SYST BP GE 130 - 139MM HG: CPT | Performed by: PHYSICIAN ASSISTANT

## 2020-08-07 PROCEDURE — 99213 OFFICE O/P EST LOW 20 MIN: CPT | Performed by: PHYSICIAN ASSISTANT

## 2020-08-07 PROCEDURE — 1036F TOBACCO NON-USER: CPT | Performed by: PHYSICIAN ASSISTANT

## 2020-08-07 PROCEDURE — 3008F BODY MASS INDEX DOCD: CPT | Performed by: PHYSICIAN ASSISTANT

## 2020-08-07 PROCEDURE — 3044F HG A1C LEVEL LT 7.0%: CPT | Performed by: PHYSICIAN ASSISTANT

## 2020-08-07 PROCEDURE — 3078F DIAST BP <80 MM HG: CPT | Performed by: PHYSICIAN ASSISTANT

## 2020-08-07 RX ORDER — ALBUTEROL SULFATE 90 UG/1
2 AEROSOL, METERED RESPIRATORY (INHALATION) EVERY 4 HOURS PRN
Qty: 1 INHALER | Refills: 4 | Status: SHIPPED | OUTPATIENT
Start: 2020-08-07

## 2020-08-07 RX ORDER — BUDESONIDE AND FORMOTEROL FUMARATE DIHYDRATE 160; 4.5 UG/1; UG/1
2 AEROSOL RESPIRATORY (INHALATION) 2 TIMES DAILY
Qty: 1 INHALER | Refills: 11 | Status: SHIPPED | OUTPATIENT
Start: 2020-08-07 | End: 2020-10-16

## 2020-08-07 RX ORDER — PREDNISONE 20 MG/1
40 TABLET ORAL DAILY
Qty: 10 TABLET | Refills: 0 | Status: SHIPPED | OUTPATIENT
Start: 2020-08-07 | End: 2020-08-24

## 2020-08-07 NOTE — PROGRESS NOTES
Assessment:    1  Moderate persistent asthma with exacerbation  predniSONE 20 mg tablet   2  Asthma-COPD overlap syndrome ([address] )  Ambulatory referral to Pulmonology    Symbicort 160-4 5 MCG/ACT inhaler    albuterol (PROVENTIL HFA,VENTOLIN HFA) 90 mcg/act inhaler    tiotropium (SPIRIVA RESPIMAT) 2 5 MCG/ACT AERS inhaler   3  Obstructive sleep apnea        02/08/2018 complete pulmonary function testing   FEV1/ FVC ratio 59%, FEV1 is 35% predicted, FVC 46% predicted no significant improvement with bronchodilators  % predicted, residual volume 139% predicted diffusion capacity 85% predicted  Spirometry with severe obstruction and reduced vital capacity due to air trapping  No significant BD response lung volumes with increased residual volume suggesting air trapping normal diffusion    Plan: Will continue Symbicort 2 inhalations twice daily, albuterol Q 4 p r n     Added Spiriva 2 5 mcg 2 inhalations Q a m  2 samples given  Also 5 day burst of prednisone 40 mg daily ordered  Patient instructed to go to the emergency room with any worsening symptoms  Patient will continue her CPAP q h s  would like to see patient back in approximately 2 weeks for re-evaluation  Patient instructed to call with any questions / concerns or worsening symptoms  Subjective:     Patient ID: Ileana Rg is a 62 y o  female  Chief Complaint: progressive dyspnea  HPI   73-year-old female here today for hospital follow-up visit  Patient was hospitalized in May and Pulmonary was following for acute hypoxic respiratory failure, severe chronic obstructive lung disease likely asthma/COPD overlap with exacerbation, WILMA compliant with CPAP at 10, morbid obesity and likely restrictive lung disease, former tobacco abuse, Afib on Eliquis and abnormal CT with chronic mosaic hypoattenuation likely small airway disease from asthma    Patient states was breathing somewhat easier when discharged however has been having slow/progressive worsening dyspnea with sensation that she cannot get air in or out of her lungs  Patient's current pulmonary medications consist of albuterol and Symbicort  States has been using her CPAP religiously  Lots of stressors at home right now with mother chronically ill and would likely pass away sometimes week  Review of Systems    Positive dyspnea at rest and with activity  Occasional audible wheezing  Denies any fevers, chills, chest pain, abdominal pain, nausea, vomiting, diarrhea, headaches, dizziness or significant cough  All other systems negative  Objective:  /72 (BP Location: Left arm, Patient Position: Sitting)   Pulse 80   Temp 98 °F (36 7 °C) (Tympanic)   Ht 5' 3" (1 6 m)   Wt 125 kg (275 lb 9 6 oz)   SpO2 96%   BMI 48 82 kg/m²     Physical Exam  Vitals signs and nursing note reviewed  Constitutional:       General: She is not in acute distress  Appearance: She is not ill-appearing, toxic-appearing or diaphoretic  HENT:      Head: Normocephalic and atraumatic  Nose: Nose normal       Mouth/Throat:      Mouth: Mucous membranes are moist    Eyes:      General: No scleral icterus  Conjunctiva/sclera: Conjunctivae normal       Pupils: Pupils are equal, round, and reactive to light  Neck:      Musculoskeletal: Neck supple  Cardiovascular:      Rate and Rhythm: Normal rate and regular rhythm  Heart sounds: Normal heart sounds  No murmur  Pulmonary:      Effort: Pulmonary effort is normal  No respiratory distress  Breath sounds: No stridor  Wheezing (  Faint end-expiratory wheeze diffusely) present  No rhonchi or rales  Comments:  No rhonchi rales noted  Chest:      Chest wall: No tenderness  Abdominal:      General: Bowel sounds are normal       Palpations: Abdomen is soft  Tenderness: There is no abdominal tenderness  Musculoskeletal:      Right lower leg: No edema  Left lower leg: No edema     Lymphadenopathy:      Cervical: No cervical adenopathy  Skin:     General: Skin is warm and dry  Capillary Refill: Capillary refill takes less than 2 seconds  Neurological:      General: No focal deficit present  Mental Status: She is alert and oriented to person, place, and time     Psychiatric:         Mood and Affect: Mood normal

## 2020-08-20 ENCOUNTER — OFFICE VISIT (OUTPATIENT)
Dept: FAMILY MEDICINE CLINIC | Facility: HOSPITAL | Age: 60
End: 2020-08-20
Payer: COMMERCIAL

## 2020-08-20 VITALS
BODY MASS INDEX: 48.9 KG/M2 | HEART RATE: 75 BPM | SYSTOLIC BLOOD PRESSURE: 112 MMHG | OXYGEN SATURATION: 93 % | HEIGHT: 63 IN | DIASTOLIC BLOOD PRESSURE: 80 MMHG | TEMPERATURE: 97.5 F | WEIGHT: 276 LBS

## 2020-08-20 DIAGNOSIS — G47.00 INSOMNIA, UNSPECIFIED TYPE: ICD-10-CM

## 2020-08-20 DIAGNOSIS — I48.91 ATRIAL FIBRILLATION, UNSPECIFIED TYPE (HCC): ICD-10-CM

## 2020-08-20 DIAGNOSIS — B35.4 TINEA CORPORIS: Primary | ICD-10-CM

## 2020-08-20 DIAGNOSIS — I10 ESSENTIAL HYPERTENSION: ICD-10-CM

## 2020-08-20 DIAGNOSIS — Z79.899 ENCOUNTER FOR LONG-TERM (CURRENT) DRUG USE: ICD-10-CM

## 2020-08-20 DIAGNOSIS — I50.31 ACUTE DIASTOLIC CONGESTIVE HEART FAILURE (HCC): ICD-10-CM

## 2020-08-20 DIAGNOSIS — D50.9 MICROCYTIC ANEMIA: ICD-10-CM

## 2020-08-20 PROBLEM — J18.9 COMMUNITY ACQUIRED PNEUMONIA: Status: RESOLVED | Noted: 2020-05-21 | Resolved: 2020-08-20

## 2020-08-20 PROCEDURE — 3008F BODY MASS INDEX DOCD: CPT | Performed by: FAMILY MEDICINE

## 2020-08-20 PROCEDURE — 3079F DIAST BP 80-89 MM HG: CPT | Performed by: FAMILY MEDICINE

## 2020-08-20 PROCEDURE — 3074F SYST BP LT 130 MM HG: CPT | Performed by: FAMILY MEDICINE

## 2020-08-20 PROCEDURE — 1036F TOBACCO NON-USER: CPT | Performed by: FAMILY MEDICINE

## 2020-08-20 PROCEDURE — 3044F HG A1C LEVEL LT 7.0%: CPT | Performed by: FAMILY MEDICINE

## 2020-08-20 PROCEDURE — 99213 OFFICE O/P EST LOW 20 MIN: CPT | Performed by: FAMILY MEDICINE

## 2020-08-20 RX ORDER — METOPROLOL SUCCINATE 50 MG/1
50 TABLET, EXTENDED RELEASE ORAL DAILY
Qty: 90 TABLET | Refills: 1 | Status: SHIPPED | OUTPATIENT
Start: 2020-08-20 | End: 2021-01-13 | Stop reason: SDUPTHER

## 2020-08-20 RX ORDER — CLOTRIMAZOLE 1 %
CREAM (GRAM) TOPICAL 2 TIMES DAILY
Qty: 30 G | Refills: 0 | Status: SHIPPED | OUTPATIENT
Start: 2020-08-20 | End: 2021-05-02

## 2020-08-20 RX ORDER — FUROSEMIDE 40 MG/1
40 TABLET ORAL 2 TIMES DAILY
Qty: 60 TABLET | Refills: 5 | Status: SHIPPED | OUTPATIENT
Start: 2020-08-20 | End: 2020-11-24 | Stop reason: ALTCHOICE

## 2020-08-20 RX ORDER — FLECAINIDE ACETATE 100 MG/1
100 TABLET ORAL EVERY 12 HOURS SCHEDULED
Qty: 180 TABLET | Refills: 1 | Status: SHIPPED | OUTPATIENT
Start: 2020-08-20 | End: 2021-03-08 | Stop reason: SDUPTHER

## 2020-08-20 RX ORDER — LORAZEPAM 0.5 MG/1
TABLET ORAL
Qty: 90 TABLET | Refills: 0 | Status: SHIPPED | OUTPATIENT
Start: 2020-08-20 | End: 2020-09-21 | Stop reason: SDUPTHER

## 2020-08-20 RX ORDER — POTASSIUM CHLORIDE 20 MEQ/1
20 TABLET, EXTENDED RELEASE ORAL 2 TIMES DAILY
Qty: 180 TABLET | Refills: 1 | Status: SHIPPED | OUTPATIENT
Start: 2020-08-20 | End: 2021-03-08 | Stop reason: SDUPTHER

## 2020-08-20 RX ORDER — FERROUS SULFATE TAB EC 324 MG (65 MG FE EQUIVALENT) 324 (65 FE) MG
324 TABLET DELAYED RESPONSE ORAL
Qty: 30 TABLET | Refills: 0 | Status: SHIPPED | OUTPATIENT
Start: 2020-08-20 | End: 2020-10-19 | Stop reason: SDUPTHER

## 2020-08-20 RX ORDER — TRAZODONE HYDROCHLORIDE 150 MG/1
150 TABLET ORAL
Qty: 30 TABLET | Refills: 0 | Status: SHIPPED | OUTPATIENT
Start: 2020-08-20 | End: 2020-09-21 | Stop reason: SDUPTHER

## 2020-08-20 NOTE — PROGRESS NOTES
Pulmonary Follow Up Note   Doris De La Vega 62 y o  female MRN: [id number]  8/24/2020      Assessment:    Moderate persistent asthma without complication  1  Continue Symbicort, Spiriva, and albuterol  Continue prednisone taper  Ms Yasmin Rider presents to the office today for a follow-up from a sick visit she had about 2 weeks ago  She reports that she is still having some difficulty taking a breath in in feeling like she can not catch her breath  The shortness of breath comes periodically  Sometimes with exertion and sometimes at rest   Nothing makes the pain better or worse  She does report at times she has a twinge in the left side of her chest   She has been taking all of her medications with good compliance at home  She does not notice any improvement in her symptoms with the prednisone  She does report that her mother has been very sick and past recently and they are bearing her on Thursday  I have very low suspicion that she is having a persistent asthma exacerbation as her breath sounds are clear and she is able to talk in complete sentences without difficulty  I did recommend that she either go to the ER for further evaluation of her shortness of breath and chest pain or follow-up with her cardiologist as an outpatient  Given that the patient is trying to bury her mother on Thursday she chooses to go home and follow-up as an outpatient with her cardiologist   I suspect that her symptoms pot possibly secondary to stress and anxiety however cardiac reasons should be ruled out  She knows that should her symptoms worsen or change needs to go to the ER  She knows to contact our office with any questions or concerns  I have started her on albuterol nebulizer to see if this offers any relief and I would like her to follow-up in our office in 2 weeks      Plan:    Diagnoses and all orders for this visit:    Uncomplicated asthma, unspecified asthma severity, unspecified whether persistent  -     albuterol (2 5 mg/3 mL) 0 083 % nebulizer solution; Take 1 vial (2 5 mg total) by nebulization every 4 (four) hours as needed for wheezing or shortness of breath        No follow-ups on file  History of Present Illness   HPI:  Han Ballard is a 62 y o  female who presents to the office for a recheck  She was just seen in the office at the beginning of August for a sick visit  She reports that she did not feel any better with the prednisone taper  She also reports that she lost her mother recently  She is very depressed  She reports that she now has no one to take care of  She lost her father 2 years ago, her  1 year ago, and her mother last week  She wears her on Thursday  From a pulmonary perspective she denies any wheezing, whistling in her lungs, sputum production, cough, fevers, chills, pleuritic pain  She is having shortness of breath mainly with exertion  However her shortness of breath does at times, while she is sitting and resting  She is not sleeping well at night  She is on Symbicort Spiriva and albuterol  She was sent home on a prednisone taper at that time  She also uses CPAP for hours asleep       Review of Systems   Constitutional: Positive for activity change, appetite change and fatigue  Negative for chills, diaphoresis, fever and unexpected weight change  HENT: Negative for congestion, mouth sores, nosebleeds, postnasal drip, rhinorrhea, sinus pressure, sneezing and trouble swallowing  Respiratory: Positive for shortness of breath  Negative for apnea, cough, choking, chest tightness, wheezing and stridor  Cardiovascular: Negative for chest pain, palpitations and leg swelling  Gastrointestinal: Negative for abdominal distention, abdominal pain, constipation, diarrhea, nausea and vomiting  Musculoskeletal: Negative for arthralgias  Skin: Negative for rash  Hematological: Negative for adenopathy  All other systems reviewed and are negative        Historical Information Past Medical History:   Diagnosis Date    Alcohol abuse 5/21/2020    Anemia     Cervical radiculopathy     CH[facility]congestive heart failure) ([facility])     Chronic low back pain     Chronic obstructive asthma ([facility] ) 2/20/2018    Community acquired pneumonia     Community acquired pneumonia 5/21/2020    Diabetes mellitus ([facility] )     Diabetes mellitus with hyperglycemia ([facility])     Elevated liver enzymes     GERD (gastroesophageal reflux disease)     Paresthesia of upper extremity     Plantar fasciitis     Restless leg     Sexual dysfunction     Sleep apnea, obstructive     Tenosynovitis of finger     Tinea corporis     Weakness of upper extremity      Past Surgical History:   Procedure Laterality Date    ABDOMINAL SURGERY      CARPAL TUNNEL RELEASE Bilateral     CHOLECYSTECTOMY      laparoscopic    ESOPHAGOGASTRODUODENOSCOPY N/A 12/3/2018    Procedure: ESOPHAGOGASTRODUODENOSCOPY (EGD) AND COLONOSCOPY;  Surgeon: Hawa Mojica MD;  Location: [facility];  Service: Gastroenterology    GASTRIC BYPASS      for morbid obesity with gilda en y    HERNIA REPAIR      HYSTERECTOMY      VT EXCIS PRIMARY GANGLION WRIST Left 12/14/2017    Procedure: LONG FINGER GANGLION CYST EXCISION;  Surgeon: Rosalie Ortiz MD;  Location[facility];  Service: Orthopedics    VT INCISE FINGER TENDON SHEATH Left 12/14/2017    Procedure: Lorenda Nation, RING, TRIGGER FINGER RELEASE;  Surgeon: Rosalie Ortiz MD;  [facility];  Service: Orthopedics    SHOULDER SURGERY       Family History   Problem Relation Age of Onset    Alzheimer's disease Mother     Atrial fibrillation Mother     Dementia Mother     Heart disease Mother         heart problem    Seizures Mother     Parkinsonism Father     Arthritis Father     Atrial fibrillation Father     Substance Abuse Neg Hx         mother,father    Mental illness Neg Hx         mother,father         Meds/Allergies     Current Outpatient Medications:     albuterol (PROVENTIL HFA,VENTOLIN HFA) 90 mcg/act inhaler, Inhale 2 puffs every 4 (four) hours as needed for wheezing, Disp: 1 Inhaler, Rfl: 4    apixaban (Eliquis) 5 mg, Take 1 tablet (5 mg total) by mouth 2 (two) times a day, Disp: 60 tablet, Rfl: 5    ascorbic acid (VITAMIN C) 1000 MG tablet, Take 1,000 mg by mouth daily  , Disp: , Rfl:     Blood Glucose Monitoring Suppl ([employer] CONTOUR NEXT MONITOR) w/Device KIT, by Does not apply route daily, Disp: , Rfl:     Cholecalciferol 1000 units tablet, Take 1,000 Units by mouth daily  , Disp: , Rfl:     clotrimazole (LOTRIMIN) 1 % cream, Apply topically 2 (two) times a day, Disp: 30 g, Rfl: 0    ferrous sulfate 324 (65 Fe) mg, Take 1 tablet (324 mg total) by mouth daily before breakfast, Disp: 30 tablet, Rfl: 0    flecainide (TAMBOCOR) 100 mg tablet, Take 1 tablet (100 mg total) by mouth every 12 (twelve) hours, Disp: 180 tablet, Rfl: 1    furosemide (LASIX) 40 mg tablet, Take 1 tablet (40 mg total) by mouth 2 (two) times a day, Disp: 60 tablet, Rfl: 5    glimepiride (AMARYL) 4 mg tablet, Take 1 tablet by mouth once daily with breakfast, Disp: 30 tablet, Rfl: 5    glucose blood (CONTOUR NEXT TEST) test strip, Test blood sugar 3 times a day, Disp: 100 each, Rfl: 3    LIFESCAN UNISTIK II LANCETS MISC, by Does not apply route 3 (three) times a day, Disp: 100 each, Rfl: 5    LORazepam (ATIVAN) 0 5 mg tablet, TAKE 2 TABLETS BY MOUTH AT BEDTIME AND 1 EVERY 6 HOURS AS NEEDED FOR ANXIETY, Disp: 90 tablet, Rfl: 0    Magnesium 400 MG CAPS, Take by mouth 1 BID, Disp: , Rfl:     metFORMIN (GLUCOPHAGE-XR) 500 mg 24 hr tablet, Take 2 tablets (1,000 mg total) by mouth 2 (two) times a day with meals, Disp: 120 tablet, Rfl: 5    metoprolol succinate (TOPROL-XL) 50 mg 24 hr tablet, Take 1 tablet (50 mg total) by mouth daily, Disp: 90 tablet, Rfl: 1    naloxone (NARCAN) 4 mg/0 1 mL nasal spray, Administer 1 spray into a nostril   If breathing does not return to normal or if breathing difficulty resumes after 2-3 minutes, give another dose in the other nostril using a new spray , Disp: 1 each, Rfl: 1    omeprazole (PriLOSEC) 40 MG capsule, Take 1 capsule (40 mg total) by mouth daily, Disp: 90 capsule, Rfl: 3    potassium chloride (K-DUR,KLOR-CON) 20 mEq tablet, Take 1 tablet (20 mEq total) by mouth 2 (two) times a day, Disp: 180 tablet, Rfl: 1    Symbicort 160-4 5 MCG/ACT inhaler, Inhale 2 puffs 2 (two) times a day Rinse mouth after use, Disp: 1 Inhaler, Rfl: 11    tiotropium (SPIRIVA RESPIMAT) 2 5 MCG/ACT AERS inhaler, Inhale 2 puffs daily, Disp: 2 Inhaler, Rfl: 0    traMADol (ULTRAM) 50 mg tablet, TAKE 2 TABLETS BY MOUTH IN THE MORNING AND 2 IN THE EVENING AS NEEDED FOR MODERATE TO SEVERE PAIN, Disp: 120 tablet, Rfl: 0    traZODone (DESYREL) 150 mg tablet, Take 1 tablet (150 mg total) by mouth daily at bedtime, Disp: 30 tablet, Rfl: 0    albuterol (2 5 mg/3 mL) 0 083 % nebulizer solution, Take 1 vial (2 5 mg total) by nebulization every 4 (four) hours as needed for wheezing or shortness of breath, Disp: 120 vial, Rfl: 3  Allergies   Allergen Reactions    Iron Dextran Swelling    Januvia [Sitagliptin] Shortness Of Breath    Jardiance [Empagliflozin] Shortness Of Breath    Clonazepam Other (See Comments)     Unknown reaction    Codeine Itching and Other (See Comments)     itch;mary kay morphine,takes ultram @home    Adhesive [Medical Tape]      itching    Effexor [Venlafaxine] Itching    Lactose     Oxycodone-Acetaminophen      Other reaction(s): Other (See Comments)  (PERCOCET) MILD RASH, not allergic to Acetaminophen     Oxycodone-Acetaminophen Anxiety       Vitals: Blood pressure 118/60, pulse 90, temperature (!) 96 9 °F (36 1 °C), height 5' 3" (1 6 m), weight 125 kg (276 lb), SpO2 91 %  Body mass index is 48 89 kg/m²  Physical Exam  Physical Exam  Vitals signs and nursing note reviewed  Constitutional:       General: She is not in acute distress  Appearance: She is obese  She is not ill-appearing  Comments: Obese female  HENT:      Head: Normocephalic and atraumatic  Right Ear: External ear normal       Left Ear: External ear normal       Nose: Nose normal  No congestion  Mouth/Throat:      Pharynx: No oropharyngeal exudate  Eyes:      Pupils: Pupils are equal, round, and reactive to light  Neck:      Musculoskeletal: Normal range of motion and neck supple  Cardiovascular:      Rate and Rhythm: Normal rate and regular rhythm  Heart sounds: No murmur  No friction rub  No gallop  Pulmonary:      Effort: Pulmonary effort is normal       Breath sounds: Normal breath sounds  No wheezing or rhonchi  Abdominal:      General: There is no distension  Palpations: There is no mass  Tenderness: There is no abdominal tenderness  Comments: Obese   Musculoskeletal:         General: No swelling or tenderness  Skin:     General: Skin is warm and dry  Capillary Refill: Capillary refill takes less than 2 seconds  Neurological:      Mental Status: She is alert  Labs: I have personally reviewed pertinent lab results  , ABG: No results found for: PHART, [id number], PO2ART, VHH6RYK, F9MHWPPX, BEART, SOURCE, BNP: No results found for: BNP, CBC: No results found for: WBC, HGB, HCT, MCV, PLT, ADJUSTEDWBC, MCH, MCHC, RDW, MPV, NRBC, CMP: No results found for: SODIUM, K, CL, CO2, ANIONGAP, BUN, CREATININE, GLUCOSE, CALCIUM, AST, ALT, ALKPHOS, PROT, BILITOT, EGFR, PT/INR: No results found for: PT, INR, Troponin: No results found for: TROPONINI  Lab Results   Component Value Date    WBC 14 44 (H) 05/23/2020    HGB 10 2 (L) 05/23/2020    HCT 33 7 (L) 05/23/2020    MCV 73 (L) 05/23/2020     05/23/2020     Lab Results   Component Value Date    GLUCOSE 131 (H) 09/22/2017    CALCIUM 8 3 05/23/2020     09/22/2017    K 4 1 05/23/2020    CO2 31 05/23/2020    CL 94 (L) 05/23/2020    BUN 17 05/23/2020    CREATININE 1 00 05/23/2020     No results found for: IGE  Lab Results   Component Value Date    ALT 31 05/21/2020    AST 23 05/21/2020    ALKPHOS 136 (H) 05/21/2020    BILITOT 0 4 09/22/2017       Imaging and other studies: I have personally reviewed pertinent reports  CTA Chest:   FINDINGS:     LUNGS:  Mosaic pattern of groundglass attenuation throughout the lung parenchyma is again noted concerning for small vessel or small airways disease      Tracheal or endobronchial lesion is noted     PLEURA:  Unremarkable      HEART/GREAT VESSELS:  Unremarkable for patient's age      MEDIASTINUM AND SHANTA:  Small hiatal hernia noted  No mediastinal or hilar lymphadenopathy      CHEST WALL AND LOWER NECK:   Unremarkable      VISUALIZED STRUCTURES IN THE UPPER ABDOMEN:  Visualized hepatic parenchyma is diffusely decreased in density consistent with hepatic steatosis  Post gastric bypass  Otherwise no clinical significant abnormality identified in the visualized upper   abdomen      OSSEOUS STRUCTURES:  No acute fracture or destructive osseous lesion      IMPRESSION:     Mosaic pattern of groundglass attenuation throughout the lung parenchyma is again noted concerning for small vessel or small airways disease      Consider elective pulmonary consultation      Pulmonary function testing:   PFT 2017 :   Results:  Patient gave good effort and cooperation  The testing met ATS Standards for Acceptability and Repeatability  Baseline oxygen saturation was 97% on room air with a heart rate of 79      Spirometry:  FEV1/FVC Ratio is 59%  FEV1 is 35% predicted  FVC is 46% predicted  There is significant improvement with bronchodilators     Lung volumes: Total lung capacity is 105% predicted    Residual volume is 139% predicted      Diffusing capacity:  85% predicted     Flow volume loop:  Consistent with obstruction     IMPRESSION:  · Spirometry with severe obstruction and reduced vital capacity due to air trapping  · There is significant improvement with bronchodilators  · Lung volumes with increased residual volume suggesting air trapping  · Normal diffusion capacity    Sleep study 2017:      IMPRESSION:   She underwent a titration of CPAP to 12 and this pressure seemed to eliminate snoring and respiratory events  However, she was still having some episodes at a pressure of 11 and at 12, she did not go into REM sleep  Therefore, I recommend a trial of auto-titrating CPAP 12-20 cc with Cflex 3, using a Resmed Airfit N-20 interface and heated humidification  Compliance data should be evaluated in 1-2 months  EKG, Pathology, and Other Studies: I have personally reviewed pertinent reports          Isaura Bellamy PA-C

## 2020-08-21 NOTE — PROGRESS NOTES
Assessment/Plan:      Problem List Items Addressed This Visit        Cardiovascular and Mediastinum    Acute diastolic congestive heart failure (HCC)    Relevant Medications    metoprolol succinate (TOPROL-XL) 50 mg 24 hr tablet    potassium chloride (K-DUR,KLOR-CON) 20 mEq tablet    furosemide (LASIX) 40 mg tablet    Atrial fibrillation (HCC)    Relevant Medications    apixaban (Eliquis) 5 mg    metoprolol succinate (TOPROL-XL) 50 mg 24 hr tablet    flecainide (TAMBOCOR) 100 mg tablet       Other    Microcytic anemia    Relevant Medications    ferrous sulfate 324 (65 Fe) mg      Other Visit Diagnoses     Tinea corporis    -  Primary    Relevant Medications    clotrimazole (LOTRIMIN) 1 % cream    Essential hypertension        Relevant Medications    metoprolol succinate (TOPROL-XL) 50 mg 24 hr tablet    furosemide (LASIX) 40 mg tablet    Insomnia, unspecified type        Relevant Medications    traZODone (DESYREL) 150 mg tablet    Encounter for long-term (current) drug use        Relevant Medications    LORazepam (ATIVAN) 0 5 mg tablet           Medications refilled  Today focused on Tinea corporis  Treat with lotrimin twice a day  Keep area dry and clean  Mother  this morning  She has been coping well  I have asked that she f/u in 2-4 weeks for review of her chronic conditions at that time  Subjective:   Chief Complaint   Patient presents with    Rash     Left leg         Patient ID: Doris De La Vega is a 62 y o  female  Pt seen today for a rash in the left groin area  Spreading and very itchy  No fever, no chills  No discharge  Her mother  in hospice this morning               The following portions of the patient's history were reviewed and updated as appropriate: allergies, current medications, past family history, past medical history, past social history, past surgical history and problem list     Review of Systems   Constitutional: Negative for chills and fever  Gastrointestinal: Negative for diarrhea and nausea  Objective:  Vitals:    08/20/20 1353   BP: 112/80   Pulse: 75   Temp: 97 5 °F (36 4 °C)   SpO2: 93%   Weight: 125 kg (276 lb)   Height: 5' 3" (1 6 m)     BP Readings from Last 6 Encounters:   08/20/20 112/80   08/07/20 130/72   05/24/20 128/75   05/21/20 144/76   03/12/20 153/73   02/24/20 124/70      Wt Readings from Last 6 Encounters:   08/20/20 125 kg (276 lb)   08/07/20 125 kg (275 lb 9 6 oz)   06/01/20 122 kg (270 lb)   05/24/20 123 kg (272 lb)   05/21/20 122 kg (268 lb)   03/12/20 119 kg (262 lb 5 6 oz)             Physical Exam  Vitals signs and nursing note reviewed  Constitutional:       Appearance: She is obese  Skin:     Comments: Erythematous patch over the groin and inguinal area  With satellite lesions  With few excoriations  Neurological:      Mental Status: She is alert  Admission on 05/21/2020, Discharged on 05/24/2020   Component Date Value Ref Range Status    PTT 05/21/2020 33  23 - 37 seconds Final    Therapeutic Heparin Range =  60-90 seconds    Protime 05/21/2020 14 5  11 6 - 14 5 seconds Final    INR 05/21/2020 1 16  0 84 - 1 19 Final    Blood Culture 05/21/2020 No Growth After 5 Days  Final    Blood Culture 05/21/2020 No Growth After 5 Days     Final    WBC 05/21/2020 13 55* 4 31 - 10 16 Thousand/uL Final    RBC 05/21/2020 5 14* 3 81 - 5 12 Million/uL Final    Hemoglobin 05/21/2020 11 2* 11 5 - 15 4 g/dL Final    Hematocrit 05/21/2020 37 7  34 8 - 46 1 % Final    MCV 05/21/2020 73* 82 - 98 fL Final    MCH 05/21/2020 21 8* 26 8 - 34 3 pg Final    MCHC 05/21/2020 29 7* 31 4 - 37 4 g/dL Final    RDW 05/21/2020 16 8* 11 6 - 15 1 % Final    MPV 05/21/2020 10 6  8 9 - 12 7 fL Final    Platelets 03/97/5626 325  149 - 390 Thousands/uL Final    nRBC 05/21/2020 0  /100 WBCs Final    Neutrophils Relative 05/21/2020 79* 43 - 75 % Final    Immat GRANS % 05/21/2020 1  0 - 2 % Final    Lymphocytes Relative 05/21/2020 10* 14 - 44 % Final    Monocytes Relative 05/21/2020 7  4 - 12 % Final    Eosinophils Relative 05/21/2020 2  0 - 6 % Final    Basophils Relative 05/21/2020 1  0 - 1 % Final    Neutrophils Absolute 05/21/2020 10 76* 1 85 - 7 62 Thousands/µL Final    Immature Grans Absolute 05/21/2020 0 07  0 00 - 0 20 Thousand/uL Final    Lymphocytes Absolute 05/21/2020 1 41  0 60 - 4 47 Thousands/µL Final    Monocytes Absolute 05/21/2020 0 94  0 17 - 1 22 Thousand/µL Final    Eosinophils Absolute 05/21/2020 0 30  0 00 - 0 61 Thousand/µL Final    Basophils Absolute 05/21/2020 0 07  0 00 - 0 10 Thousands/µL Final    Sodium 05/21/2020 134* 136 - 145 mmol/L Final    Potassium 05/21/2020 3 9  3 5 - 5 3 mmol/L Final    Chloride 05/21/2020 96* 100 - 108 mmol/L Final    CO2 05/21/2020 32  21 - 32 mmol/L Final    ANION GAP 05/21/2020 6  4 - 13 mmol/L Final    BUN 05/21/2020 11  5 - 25 mg/dL Final    Creatinine 05/21/2020 0 87  0 60 - 1 30 mg/dL Final    Standardized to IDMS reference method    Glucose 05/21/2020 160* 65 - 140 mg/dL Final      If the patient is fasting, the ADA then defines impaired fasting glucose as > 100 mg/dL and diabetes as > or equal to 123 mg/dL  Specimen collection should occur prior to Sulfasalazine administration due to the potential for falsely depressed results  Specimen collection should occur prior to Sulfapyridine administration due to the potential for falsely elevated results   Calcium 05/21/2020 9 0  8 3 - 10 1 mg/dL Final    AST 05/21/2020 23  5 - 45 U/L Final      Specimen collection should occur prior to Sulfasalazine administration due to the potential for falsely depressed results   ALT 05/21/2020 31  12 - 78 U/L Final      Specimen collection should occur prior to Sulfasalazine administration due to the potential for falsely depressed results       Alkaline Phosphatase 05/21/2020 136* 46 - 116 U/L Final    Total Protein 05/21/2020 7 5  6 4 - 8 2 g/dL Final  Albumin 05/21/2020 3 7  3 5 - 5 0 g/dL Final    Total Bilirubin 05/21/2020 0 30  0 20 - 1 00 mg/dL Final      Use of this assay is not recommended for patients undergoing treatment with eltrombopag due to the potential for falsely elevated results   eGFR 05/21/2020 72  ml/min/1 73sq m Final    LACTIC ACID 05/21/2020 2 9* 0 5 - 2 0 mmol/L Final    Procalcitonin 05/21/2020 <0 05  <=0 25 ng/ml Final    Comment: Suspected Lower Respiratory Tract Infection (LRTI):  - LESS than or EQUAL to 0 25 ng/mL:   low likelihood for bacterial LRTI; antibiotics DISCOURAGED   - GREATER than 0 25 ng/mL:   increased likelihood for bacterial LRTI; antibiotics ENCOURAGED  Suspected Sepsis:  - Strongly consider initiating antibiotics in ALL UNSTABLE patients  - LESS than or EQUAL to 0 5 ng/mL:   low likelihood for bacterial sepsis; antibiotics DISCOURAGED   - GREATER than 0 5 ng/mL:   increased likelihood for bacterial sepsis; antibiotics ENCOURAGED   - GREATER than 2 ng/mL:   high risk for severe sepsis / septic shock; antibiotics strongly ENCOURAGED  Decisions on antibiotic use should not be based solely on Procalcitonin (PCT) levels  If PCT is low but uncertainty exists with stopping antibiotics, repeat PCT in 6-24 hours to confirm the low level  If antibiotics are administered (regardless if initial PCT was high or low), repeat PCT every 1-2 days to consider early antibiotic cessation (when GREATER                            than 80% decrease from the peak OR when PCT drops below designated cutoffs, whichever comes first), so long as the infection is NOT one that typically requires prolonged treatment durations (e g , bone/joint infections, endocarditis, Staph  aureus bacteremia)      Situations of FALSE-POSITIVE Procalcitonin values:  1) Newborns < 67 hours old  2) Massive stress from severe trauma / burns, major surgery, acute pancreatitis, cardiogenic / hemorrhagic shock, sickle cell crisis, or other organ perfusion abnormalities  3) Malaria and some Candidal infections  4) Treatment with agents that stimulate cytokines (e g , OKT3, anti-lymphocyte globulins, alemtuzumab, IL-2, granulocyte transfusion [NOT GCSFs])  5) Chronic renal disease causes elevated baseline levels (consider GREATER than 0 75 ng/mL as an abnormal cut-off); initiating HD/CRRT may cause transient decreases  6) Paraneoplastic syndromes from medullary thyroid or SCLC, some forms of vasculitis, and acute [id number]                            disease    Situations of FALSE-NEGATIVE Procalcitonin values:  1) Too early in clinical course for PCT to have reached its peak (may repeat in 6-24 hours to confirm low level)  2) Localized infection WITHOUT systemic (SIRS / sepsis) response (e g , an abscess, osteomyelitis, cystitis)  3) Mycobacteria (e g , Tuberculosis, MAC)  4) Cystic fibrosis exacerbations      Color, UA 05/21/2020 Yellow   Final    Clarity, UA 05/21/2020 Clear   Final    Specific [place], UA 05/21/2020 1 010  1 003 - 1 030 Final    pH, UA 05/21/2020 5 5  4 5, 5 0, 5 5, 6 0, 6 5, 7 0, 7 5, 8 0 Final    Leukocytes, UA 05/21/2020 Negative  Negative Final    Nitrite, UA 05/21/2020 Negative  Negative Final    Protein, UA 05/21/2020 Negative  Negative mg/dl Final    Glucose, UA 05/21/2020 Negative  Negative mg/dl Final    Ketones, UA 05/21/2020 Negative  Negative mg/dl Final    Urobilinogen, UA 05/21/2020 0 2  0 2, 1 0 E U /dl E U /dl Final    Bilirubin, UA 05/21/2020 Negative  Negative Final    Blood, UA 05/21/2020 Negative  Negative Final    Troponin I 05/21/2020 <0 02  <=0 04 ng/mL Final    3Autovalidation override  [employer] Chemistry analyzer 99% cutoff is > 0 04 ng/mL in network labs     o cTnI 99% cutoff is useful only when applied to patients in the clinical setting of myocardial ischemia   o cTnI 99% cutoff should be interpreted in the context of clinical history, ECG findings and possibly cardiac imaging to establish correct diagnosis  o cTnI 99% cutoff may be suggestive but clearly not indicative of a coronary event without the clinical setting of myocardial ischemia   NT-proBNP 05/21/2020 207* <125 pg/mL Final    SARS-CoV-2 05/21/2020 Negative  Negative Final    LACTIC ACID 05/21/2020 2 7* 0 5 - 2 0 mmol/L Final    Sputum Culture 05/21/2020 2+ Growth of    Final    Mixed Respiratory nicholas    Sputum Culture 05/21/2020 Commensal respiratory nicholas only; No significant growth of Staph aureus/MRSA or Pseudomonas aeruginosa     Final    Gram Stain Result 05/21/2020 Rare Epithelial cells per low power field*  Final    Gram Stain Result 05/21/2020 No polys seen*  Final    Gram Stain Result 05/21/2020 2+ Gram positive cocci in pairs*  Final    Gram Stain Result 05/21/2020 1+ Gram negative rods*  Final    Strep pneumoniae antigen, urine 05/21/2020 Negative  Negative Final    Legionella Urinary Antigen 05/21/2020 Negative  Negative Final    Ventricular Rate 05/21/2020 77  BPM Final    Atrial Rate 05/21/2020 77  BPM Final    WA Interval 05/21/2020 178  ms Final    QRSD Interval 05/21/2020 74  ms Final    QT Interval 05/21/2020 380  ms Final    QTC Interval 05/21/2020 430  ms Final    P Axis 05/21/2020 87  degrees Final    QRS Axis 05/21/2020 84  degrees Final    T Wave [place] 05/21/2020 81  degrees Final    LACTIC ACID 05/21/2020 3 3* 0 5 - 2 0 mmol/L Final    POC Glucose 05/21/2020 123  65 - 140 mg/dl Final    LACTIC ACID 05/21/2020 4 4* 0 5 - 2 0 mmol/L Final    POC Glucose 05/21/2020 322* 65 - 140 mg/dl Final    Sodium 05/21/2020 131* 136 - 145 mmol/L Final    Potassium 05/21/2020 4 4  3 5 - 5 3 mmol/L Final    Chloride 05/21/2020 94* 100 - 108 mmol/L Final    CO2 05/21/2020 25  21 - 32 mmol/L Final    Verified by Repeat Analysis    ANION GAP 05/21/2020 12  4 - 13 mmol/L Final    BUN 05/21/2020 14  5 - 25 mg/dL Final    Creatinine 05/21/2020 1 22  0 60 - 1 30 mg/dL Final    Standardized to IDMS reference method    Glucose 05/21/2020 434* 65 - 140 mg/dL Final    Verified by Repeat Analysis  If the patient is fasting, the ADA then defines impaired fasting glucose as > 100 mg/dL and diabetes as > or equal to 123 mg/dL  Specimen collection should occur prior to Sulfasalazine administration due to the potential for falsely depressed results  Specimen collection should occur prior to Sulfapyridine administration due to the potential for falsely elevated results   Calcium 05/21/2020 8 0* 8 3 - 10 1 mg/dL Final    eGFR 05/21/2020 48  ml/min/1 73sq m Final    LACTIC ACID 05/21/2020 6 3* 0 5 - 2 0 mmol/L Final    LACTIC ACID 05/22/2020 5 1* 0 5 - 2 0 mmol/L Final    Sodium 05/22/2020 131* 136 - 145 mmol/L Final    Potassium 05/22/2020 4 6  3 5 - 5 3 mmol/L Final    Chloride 05/22/2020 94* 100 - 108 mmol/L Final    REVIEWED    CO2 05/22/2020 28  21 - 32 mmol/L Final    ANION GAP 05/22/2020 9  4 - 13 mmol/L Final    BUN 05/22/2020 11  5 - 25 mg/dL Final    Creatinine 05/22/2020 0 98  0 60 - 1 30 mg/dL Final    Standardized to IDMS reference method    Glucose 05/22/2020 320* 65 - 140 mg/dL Final    REVIEWED  If the patient is fasting, the ADA then defines impaired fasting glucose as > 100 mg/dL and diabetes as > or equal to 123 mg/dL  Specimen collection should occur prior to Sulfasalazine administration due to the potential for falsely depressed results  Specimen collection should occur prior to Sulfapyridine administration due to the potential for falsely elevated results      Calcium 05/22/2020 8 1* 8 3 - 10 1 mg/dL Final    eGFR 05/22/2020 62  ml/min/1 73sq m Final    WBC 05/22/2020 14 61* 4 31 - 10 16 Thousand/uL Final    RBC 05/22/2020 4 65  3 81 - 5 12 Million/uL Final    Hemoglobin 05/22/2020 10 1* 11 5 - 15 4 g/dL Final    Hematocrit 05/22/2020 33 8* 34 8 - 46 1 % Final    MCV 05/22/2020 73* 82 - 98 fL Final    MCH 05/22/2020 21 7* 26 8 - 34 3 pg Final    MCHC 05/22/2020 29 9* 31 4 - 37 4 g/dL Final    RDW 05/22/2020 16 8* 11 6 - 15 1 % Final    MPV 05/22/2020 10 7  8 9 - 12 7 fL Final    Platelets 72/34/7745 295  149 - 390 Thousands/uL Final    nRBC 05/22/2020 0  /100 WBCs Final    This is an appended report  These results have been appended to a previously preliminary verified report   Neutrophils Relative 05/22/2020 92* 43 - 75 % Final    Immat GRANS % 05/22/2020 1  0 - 2 % Final    Lymphocytes Relative 05/22/2020 5* 14 - 44 % Final    Monocytes Relative 05/22/2020 2* 4 - 12 % Final    Eosinophils Relative 05/22/2020 0  0 - 6 % Final    Basophils Relative 05/22/2020 0  0 - 1 % Final    Neutrophils Absolute 05/22/2020 13 53* 1 85 - 7 62 Thousands/µL Final    Immature Grans Absolute 05/22/2020 0 10  0 00 - 0 20 Thousand/uL Final    Lymphocytes Absolute 05/22/2020 0 72  0 60 - 4 47 Thousands/µL Final    Monocytes Absolute 05/22/2020 0 24  0 17 - 1 22 Thousand/µL Final    Eosinophils Absolute 05/22/2020 0 00  0 00 - 0 61 Thousand/µL Final    Basophils Absolute 05/22/2020 0 02  0 00 - 0 10 Thousands/µL Final    Procalcitonin 05/22/2020 <0 05  <=0 25 ng/ml Final    Comment: Suspected Lower Respiratory Tract Infection (LRTI):  - LESS than or EQUAL to 0 25 ng/mL:   low likelihood for bacterial LRTI; antibiotics DISCOURAGED   - GREATER than 0 25 ng/mL:   increased likelihood for bacterial LRTI; antibiotics ENCOURAGED  Suspected Sepsis:  - Strongly consider initiating antibiotics in ALL UNSTABLE patients  - LESS than or EQUAL to 0 5 ng/mL:   low likelihood for bacterial sepsis; antibiotics DISCOURAGED   - GREATER than 0 5 ng/mL:   increased likelihood for bacterial sepsis; antibiotics ENCOURAGED   - GREATER than 2 ng/mL:   high risk for severe sepsis / septic shock; antibiotics strongly ENCOURAGED  Decisions on antibiotic use should not be based solely on Procalcitonin (PCT) levels   If PCT is low but uncertainty exists with stopping antibiotics, repeat PCT in 6-24 hours to confirm the low level  If antibiotics are administered (regardless if initial PCT was high or low), repeat PCT every 1-2 days to consider early antibiotic cessation (when GREATER                            than 80% decrease from the peak OR when PCT drops below designated cutoffs, whichever comes first), so long as the infection is NOT one that typically requires prolonged treatment durations (e g , bone/joint infections, endocarditis, Staph  aureus bacteremia)  Situations of FALSE-POSITIVE Procalcitonin values:  1) Newborns < 67 hours old  2) Massive stress from severe trauma / burns, major surgery, acute pancreatitis, cardiogenic / hemorrhagic shock, sickle cell crisis, or other organ perfusion abnormalities  3) Malaria and some Candidal infections  4) Treatment with agents that stimulate cytokines (e g , OKT3, anti-lymphocyte globulins, alemtuzumab, IL-2, granulocyte transfusion [NOT GCSFs])  5) Chronic renal disease causes elevated baseline levels (consider GREATER than 0 75 ng/mL as an abnormal cut-off); initiating HD/CRRT may cause transient decreases  6) Paraneoplastic syndromes from medullary thyroid or SCLC, some forms of vasculitis, and acute [id number]                            disease    Situations of FALSE-NEGATIVE Procalcitonin values:  1) Too early in clinical course for PCT to have reached its peak (may repeat in 6-24 hours to confirm low level)  2) Localized infection WITHOUT systemic (SIRS / sepsis) response (e g , an abscess, osteomyelitis, cystitis)  3) Mycobacteria (e g , Tuberculosis, MAC)  4) Cystic fibrosis exacerbations      Iron Saturation 05/22/2020 4  % Final    TIBC 05/22/2020 538* 250 - 450 ug/dL Final    Iron 05/22/2020 21* 50 - 170 ug/dL Final      Patients treated with metal-binding drugs (ie  Deferoxamine) may have depressed iron values      Ferritin 05/22/2020 8  8 - 388 ng/mL Final    LACTIC ACID 05/22/2020 4 0* 0 5 - 2 0 mmol/L Final    LACTIC ACID 05/22/2020 4 7* 0 5 - 2 0 mmol/L Final    POC Glucose 05/22/2020 250* 65 - 140 mg/dl Final    LACTIC ACID 05/22/2020 4 0* 0 5 - 2 0 mmol/L Final    POC Glucose 05/22/2020 208* 65 - 140 mg/dl Final    POC Glucose 05/22/2020 339* 65 - 140 mg/dl Final    POC Glucose 05/22/2020 245* 65 - 140 mg/dl Final    WBC 05/23/2020 14 44* 4 31 - 10 16 Thousand/uL Final    RBC 05/23/2020 4 64  3 81 - 5 12 Million/uL Final    Hemoglobin 05/23/2020 10 2* 11 5 - 15 4 g/dL Final    Hematocrit 05/23/2020 33 7* 34 8 - 46 1 % Final    MCV 05/23/2020 73* 82 - 98 fL Final    MCH 05/23/2020 22 0* 26 8 - 34 3 pg Final    MCHC 05/23/2020 30 3* 31 4 - 37 4 g/dL Final    RDW 05/23/2020 16 8* 11 6 - 15 1 % Final    MPV 05/23/2020 11 1  8 9 - 12 7 fL Final    Platelets 57/21/4665 318  149 - 390 Thousands/uL Final    nRBC 05/23/2020 0  /100 WBCs Final    This is an appended report  These results have been appended to a previously preliminary verified report      Neutrophils Relative 05/23/2020 91* 43 - 75 % Final    Immat GRANS % 05/23/2020 1  0 - 2 % Final    Lymphocytes Relative 05/23/2020 5* 14 - 44 % Final    Monocytes Relative 05/23/2020 3* 4 - 12 % Final    Eosinophils Relative 05/23/2020 0  0 - 6 % Final    Basophils Relative 05/23/2020 0  0 - 1 % Final    Neutrophils Absolute 05/23/2020 13 28* 1 85 - 7 62 Thousands/µL Final    Immature Grans Absolute 05/23/2020 0 09  0 00 - 0 20 Thousand/uL Final    Lymphocytes Absolute 05/23/2020 0 65  0 60 - 4 47 Thousands/µL Final    Monocytes Absolute 05/23/2020 0 41  0 17 - 1 22 Thousand/µL Final    Eosinophils Absolute 05/23/2020 0 00  0 00 - 0 61 Thousand/µL Final    Basophils Absolute 05/23/2020 0 01  0 00 - 0 10 Thousands/µL Final    Sodium 05/23/2020 135* 136 - 145 mmol/L Final    Potassium 05/23/2020 4 1  3 5 - 5 3 mmol/L Final    Chloride 05/23/2020 94* 100 - 108 mmol/L Final    CO2 05/23/2020 31  21 - 32 mmol/L Final    ANION GAP 05/23/2020 10  4 - 13 mmol/L Final    BUN 05/23/2020 17 5 - 25 mg/dL Final    Creatinine 05/23/2020 1 00  0 60 - 1 30 mg/dL Final    Standardized to IDMS reference method    Glucose 05/23/2020 325* 65 - 140 mg/dL Final      If the patient is fasting, the ADA then defines impaired fasting glucose as > 100 mg/dL and diabetes as > or equal to 123 mg/dL  Specimen collection should occur prior to Sulfasalazine administration due to the potential for falsely depressed results  Specimen collection should occur prior to Sulfapyridine administration due to the potential for falsely elevated results      Calcium 05/23/2020 8 3  8 3 - 10 1 mg/dL Final    eGFR 05/23/2020 61  ml/min/1 73sq m Final    POC Glucose 05/23/2020 278* 65 - 140 mg/dl Final    POC Glucose 05/23/2020 246* 65 - 140 mg/dl Final    POC Glucose 05/23/2020 408* 65 - 140 mg/dl Final    POC Glucose 05/23/2020 253* 65 - 140 mg/dl Final    POC Glucose 05/24/2020 289* 65 - 140 mg/dl Final   Admission on 03/12/2020, Discharged on 03/12/2020   Component Date Value Ref Range Status    WBC 03/12/2020 9 02  4 31 - 10 16 Thousand/uL Final    RBC 03/12/2020 4 83  3 81 - 5 12 Million/uL Final    Hemoglobin 03/12/2020 11 1* 11 5 - 15 4 g/dL Final    Hematocrit 03/12/2020 37 0  34 8 - 46 1 % Final    MCV 03/12/2020 77* 82 - 98 fL Final    MCH 03/12/2020 23 0* 26 8 - 34 3 pg Final    MCHC 03/12/2020 30 0* 31 4 - 37 4 g/dL Final    RDW 03/12/2020 16 8* 11 6 - 15 1 % Final    MPV 03/12/2020 11 0  8 9 - 12 7 fL Final    Platelets 49/67/0763 355  149 - 390 Thousands/uL Final    nRBC 03/12/2020 0  /100 WBCs Final    Neutrophils Relative 03/12/2020 75  43 - 75 % Final    Immat GRANS % 03/12/2020 0  0 - 2 % Final    Lymphocytes Relative 03/12/2020 14  14 - 44 % Final    Monocytes Relative 03/12/2020 7  4 - 12 % Final    Eosinophils Relative 03/12/2020 3  0 - 6 % Final    Basophils Relative 03/12/2020 1  0 - 1 % Final    Neutrophils Absolute 03/12/2020 6 78  1 85 - 7 62 Thousands/µL Final    Immature Grans Absolute 03/12/2020 0 04  0 00 - 0 20 Thousand/uL Final    Lymphocytes Absolute 03/12/2020 1 26  0 60 - 4 47 Thousands/µL Final    Monocytes Absolute 03/12/2020 0 63  0 17 - 1 22 Thousand/µL Final    Eosinophils Absolute 03/12/2020 0 25  0 00 - 0 61 Thousand/µL Final    Basophils Absolute 03/12/2020 0 06  0 00 - 0 10 Thousands/µL Final    Sodium 03/12/2020 137  136 - 145 mmol/L Final    Potassium 03/12/2020 3 7  3 5 - 5 3 mmol/L Final    Chloride 03/12/2020 97* 100 - 108 mmol/L Final    CO2 03/12/2020 34* 21 - 32 mmol/L Final    ANION GAP 03/12/2020 6  4 - 13 mmol/L Final    BUN 03/12/2020 6  5 - 25 mg/dL Final    Creatinine 03/12/2020 0 74  0 60 - 1 30 mg/dL Final    Standardized to IDMS reference method    Glucose 03/12/2020 151* 65 - 140 mg/dL Final      If the patient is fasting, the ADA then defines impaired fasting glucose as > 100 mg/dL and diabetes as > or equal to 123 mg/dL  Specimen collection should occur prior to Sulfasalazine administration due to the potential for falsely depressed results  Specimen collection should occur prior to Sulfapyridine administration due to the potential for falsely elevated results   Calcium 03/12/2020 9 0  8 3 - 10 1 mg/dL Final    AST 03/12/2020 21  5 - 45 U/L Final      Specimen collection should occur prior to Sulfasalazine administration due to the potential for falsely depressed results   ALT 03/12/2020 45  12 - 78 U/L Final      Specimen collection should occur prior to Sulfasalazine administration due to the potential for falsely depressed results       Alkaline Phosphatase 03/12/2020 132* 46 - 116 U/L Final    Total Protein 03/12/2020 6 9  6 4 - 8 2 g/dL Final    Albumin 03/12/2020 3 3* 3 5 - 5 0 g/dL Final    Total Bilirubin 03/12/2020 0 30  0 20 - 1 00 mg/dL Final    eGFR 03/12/2020 88  ml/min/1 73sq m Final    Protime 03/12/2020 14 5  11 6 - 14 5 seconds Final    INR 03/12/2020 1 16  0 84 - 1 19 Final    PTT 03/12/2020 32  23 - 37 seconds Final    Therapeutic Heparin Range =  60-90 seconds    Troponin I 03/12/2020 <0 02  <=0 04 ng/mL Final    3Autovalidation override  [employer] Chemistry analyzer 99% cutoff is > 0 04 ng/mL in network labs     o cTnI 99% cutoff is useful only when applied to patients in the clinical setting of myocardial ischemia   o cTnI 99% cutoff should be interpreted in the context of clinical history, ECG findings and possibly cardiac imaging to establish correct diagnosis  o cTnI 99% cutoff may be suggestive but clearly not indicative of a coronary event without the clinical setting of myocardial ischemia        Color, UA 03/12/2020 yellow   Final    Clarity, UA 03/12/2020 clear   Final    Glucose, UA (Ref: Negative) 03/12/2020 neg   Final    Bilirubin, UA (Ref: Negative) 03/12/2020 neg   Final    Ketones, UA (Ref: Negative) 03/12/2020 neg   Final    Spec Grav, UA (Ref:1 003-1 030) 03/12/2020 1 010   Final    Blood, UA (Ref: Negative) 03/12/2020 neg   Final    pH, UA (Ref: 4 5-8 0) 03/12/2020 7 5   Final    Protein, UA (Ref: Negative) 03/12/2020 neg   Final    Urobilinogen, UA (Ref: 0 2- 1 0) 03/12/2020 neg   Final     Leukocytes, UA (Ref: Negative) 03/12/2020 neg   Final    Nitrite, UA (Ref: Negative) 03/12/2020 neg   Final    Lipase 03/12/2020 73  73 - 393 u/L Final    Ventricular Rate 03/12/2020 84  BPM Final    Atrial Rate 03/12/2020 84  BPM Final    SD Interval 03/12/2020 186  ms Final    QRSD Interval 03/12/2020 74  ms Final    QT Interval 03/12/2020 360  ms Final    QTC Interval 03/12/2020 425  ms Final    P Axis 03/12/2020 92  degrees Final    QRS Axis 03/12/2020 74  degrees Final    T Wave Lagro 03/12/2020 79  degrees Final   Orders Only on 03/11/2020   Component Date Value Ref Range Status    H pylori Ag, Stl 03/11/2020 Negative  Negative Final   Office Visit on 02/24/2020   Component Date Value Ref Range Status    LEUKOCYTE ESTERASE,UA 02/24/2020 Trace   Final    Stephon Body 02/24/2020 Positive   Final    SL AMB POCT UROBILINOGEN 02/24/2020 Normal   Final    POCT URINE PROTEIN 02/24/2020 30   Final     PH,UA 02/24/2020 8   Final    BLOOD,UA 02/24/2020 50   Final    SPECIFIC GRAVITY,UA 02/24/2020 1 005   Final    KETONES,UA 02/24/2020 Negative   Final    BILIRUBIN,UA 02/24/2020 Negative   Final    GLUCOSE, UA 02/24/2020 Normal   Final     COLOR,UA 02/24/2020 Yellow   Final    CLARITY,UA 02/24/2020 Clear   Final    Amylase, Serum 03/09/2020 31  31 - 110 U/L Final    Lipase, Serum 03/09/2020 11* 14 - 72 U/L Final    Glucose, Random 03/09/2020 147* 65 - 99 mg/dL Final    BUN 03/09/2020 5* 8 - 27 mg/dL Final    Creatinine 03/09/2020 0 56* 0 57 - 1 00 mg/dL Final    eGFR Non African American 03/09/2020 101  >59 mL/min/1 73 Final    eGFR African American 03/09/2020 116  >59 mL/min/1 73 Final    SL AMB BUN/CREATININE RATIO 03/09/2020 9* 12 - 28 Final    Sodium 03/09/2020 134  134 - 144 mmol/L Final    Potassium 03/09/2020 4 5  3 5 - 5 2 mmol/L Final    Chloride 03/09/2020 89* 96 - 106 mmol/L Final    CO2 03/09/2020 28  20 - 29 mmol/L Final    CALCIUM 03/09/2020 8 9  8 7 - 10 3 mg/dL Final    Protein, Total 03/09/2020 6 5  6 0 - 8 5 g/dL Final    Albumin 03/09/2020 4 4  3 8 - 4 8 g/dL Final    Globulin, Total 03/09/2020 2 1  1 5 - 4 5 g/dL Final    Albumin/Globulin Ratio 03/09/2020 2 1  1 2 - 2 2 Final    TOTAL BILIRUBIN 03/09/2020 0 3  0 0 - 1 2 mg/dL Final    Alk Phos Isoenzymes 03/09/2020 115  39 - 117 IU/L Final    AST 03/09/2020 55* 0 - 40 IU/L Final    ALT 03/09/2020 52* 0 - 32 IU/L Final    Urine Culture Result 02/24/2020 Final report*  Final    Result 1 02/24/2020 Escherichia coli*  Final    Comment: 50,000-100,000 colony forming units per mL  Cefazolin <=4 ug/mL  Cefazolin with an FILIPE <=16 predicts susceptibility to the oral agents  cefaclor, cefdinir, cefpodoxime, cefprozil, cefuroxime, cephalexin,  and loracarbef when used for therapy of uncomplicated urinary tract  infections due to E  coli, Klebsiella pneumoniae, and Proteus  mirabilis        SL AMB ANTIMICROBIAL SUSCEPTIBILITY 02/24/2020 Comment   Final    Comment:       ** S = Susceptible; I = Intermediate; R = Resistant **                     P = Positive; N = Negative              MICS are expressed in micrograms per mL     Antibiotic                 RSLT#1    RSLT#2    RSLT#3    RSLT#4  Amoxicillin/Clavulanic Acid    S  Ampicillin                     S  Cefepime                       S  Ceftriaxone                    S  Ciprofloxacin                  S  Ertapenem                      S  Gentamicin                     S  Imipenem                       S  Levofloxacin                   S  Meropenem                      S  Nitrofurantoin                 S  Piperacillin/Tazobactam        S  Tetracycline                   S  Tobramycin                     S  Trimethoprim/Sulfa             S

## 2020-08-24 ENCOUNTER — OFFICE VISIT (OUTPATIENT)
Dept: PULMONOLOGY | Facility: CLINIC | Age: 60
End: 2020-08-24
Payer: COMMERCIAL

## 2020-08-24 VITALS
SYSTOLIC BLOOD PRESSURE: 118 MMHG | WEIGHT: 276 LBS | DIASTOLIC BLOOD PRESSURE: 60 MMHG | TEMPERATURE: 96.9 F | BODY MASS INDEX: 48.9 KG/M2 | HEART RATE: 90 BPM | OXYGEN SATURATION: 91 % | HEIGHT: 63 IN

## 2020-08-24 DIAGNOSIS — J45.909 UNCOMPLICATED ASTHMA, UNSPECIFIED ASTHMA SEVERITY, UNSPECIFIED WHETHER PERSISTENT: Primary | ICD-10-CM

## 2020-08-24 PROCEDURE — 3044F HG A1C LEVEL LT 7.0%: CPT | Performed by: PHYSICIAN ASSISTANT

## 2020-08-24 PROCEDURE — 99213 OFFICE O/P EST LOW 20 MIN: CPT | Performed by: PHYSICIAN ASSISTANT

## 2020-08-24 PROCEDURE — 1036F TOBACCO NON-USER: CPT | Performed by: PHYSICIAN ASSISTANT

## 2020-08-24 PROCEDURE — 3074F SYST BP LT 130 MM HG: CPT | Performed by: PHYSICIAN ASSISTANT

## 2020-08-24 PROCEDURE — 3078F DIAST BP <80 MM HG: CPT | Performed by: PHYSICIAN ASSISTANT

## 2020-08-24 PROCEDURE — 3008F BODY MASS INDEX DOCD: CPT | Performed by: PHYSICIAN ASSISTANT

## 2020-08-24 RX ORDER — ALBUTEROL SULFATE 2.5 MG/3ML
2.5 SOLUTION RESPIRATORY (INHALATION) EVERY 4 HOURS PRN
Qty: 120 VIAL | Refills: 3 | Status: SHIPPED | OUTPATIENT
Start: 2020-08-24 | End: 2020-09-23

## 2020-08-24 NOTE — PATIENT INSTRUCTIONS
1  Continue prednisone taper  2  Continue Symbicort, Spiriva, and albuterol  3  Follow-up with cardiology  4   Follow-up with us in 2 weeks for recheck

## 2020-08-25 ENCOUNTER — OFFICE VISIT (OUTPATIENT)
Dept: FAMILY MEDICINE CLINIC | Facility: HOSPITAL | Age: 60
End: 2020-08-25
Payer: COMMERCIAL

## 2020-08-25 VITALS
SYSTOLIC BLOOD PRESSURE: 120 MMHG | BODY MASS INDEX: 48.9 KG/M2 | HEART RATE: 82 BPM | WEIGHT: 276 LBS | DIASTOLIC BLOOD PRESSURE: 62 MMHG | TEMPERATURE: 98 F | HEIGHT: 63 IN | OXYGEN SATURATION: 94 %

## 2020-08-25 DIAGNOSIS — R39.9 UTI SYMPTOMS: Primary | ICD-10-CM

## 2020-08-25 LAB
SL AMB  POCT GLUCOSE, UA: 50
SL AMB LEUKOCYTE ESTERASE,UA: NORMAL
SL AMB POCT BILIRUBIN,UA: NORMAL
SL AMB POCT BLOOD,UA: 250
SL AMB POCT CLARITY,UA: CLEAR
SL AMB POCT COLOR,UA: YELLOW
SL AMB POCT KETONES,UA: NORMAL
SL AMB POCT NITRITE,UA: NORMAL
SL AMB POCT PH,UA: 6
SL AMB POCT SPECIFIC GRAVITY,UA: 1
SL AMB POCT URINE PROTEIN: NORMAL
SL AMB POCT UROBILINOGEN: NORMAL

## 2020-08-25 PROCEDURE — 81002 URINALYSIS NONAUTO W/O SCOPE: CPT | Performed by: NURSE PRACTITIONER

## 2020-08-25 PROCEDURE — 99213 OFFICE O/P EST LOW 20 MIN: CPT | Performed by: NURSE PRACTITIONER

## 2020-08-25 PROCEDURE — 3078F DIAST BP <80 MM HG: CPT | Performed by: NURSE PRACTITIONER

## 2020-08-25 PROCEDURE — 1036F TOBACCO NON-USER: CPT | Performed by: NURSE PRACTITIONER

## 2020-08-25 PROCEDURE — 3061F NEG MICROALBUMINURIA REV: CPT | Performed by: NURSE PRACTITIONER

## 2020-08-25 PROCEDURE — 3044F HG A1C LEVEL LT 7.0%: CPT | Performed by: NURSE PRACTITIONER

## 2020-08-25 PROCEDURE — 3074F SYST BP LT 130 MM HG: CPT | Performed by: NURSE PRACTITIONER

## 2020-08-25 PROCEDURE — 3008F BODY MASS INDEX DOCD: CPT | Performed by: NURSE PRACTITIONER

## 2020-08-25 RX ORDER — CEPHALEXIN 500 MG/1
500 CAPSULE ORAL EVERY 6 HOURS SCHEDULED
Qty: 20 CAPSULE | Refills: 0 | Status: SHIPPED | OUTPATIENT
Start: 2020-08-25 | End: 2020-08-30

## 2020-08-25 RX ORDER — LORATADINE 10 MG/1
TABLET ORAL
COMMUNITY

## 2020-08-25 NOTE — PROGRESS NOTES
Assessment/Plan:   Urine dip positive so will treat presumptively for UTI  Will send urine for culture  Instructed to call with no improvement or worsening symptoms  Diagnoses and all orders for this visit:    UTI symptoms  -     cephalexin (KEFLEX) 500 mg capsule; Take 1 capsule (500 mg total) by mouth every 6 (six) hours for 5 days  -     Urine culture; Future  -     Urine culture    Other orders  -     loratadine (Claritin) 10 mg tablet; Take by mouth          Subjective:     Patient ID: Breanna Thomas is a 62 y o  female  Urinary frequency and pain  Going in small amounts  Some mild lower abdominal pain  No blood in urine  No fever or chills  UTI in February  Review of Systems   Constitutional: Negative for chills and fever  Gastrointestinal: Positive for abdominal pain  Genitourinary: Positive for dysuria and frequency  Negative for flank pain and hematuria  Musculoskeletal: Negative for back pain  The following portions of the patient's history were reviewed and updated as appropriate: allergies, current medications, past family history, past medical history, past social history, past surgical history and problem list     Objective:  Vitals:    08/25/20 1032   BP: 120/62   Pulse: 82   Temp: 98 °F (36 7 °C)   SpO2: 94%      Physical Exam  Vitals signs reviewed  Constitutional:       Appearance: Normal appearance  She is obese  Cardiovascular:      Rate and Rhythm: Normal rate and regular rhythm  Heart sounds: Normal heart sounds  No murmur  Pulmonary:      Effort: Pulmonary effort is normal       Breath sounds: Normal breath sounds  Abdominal:      General: Bowel sounds are normal       Palpations: Abdomen is soft  Tenderness: There is abdominal tenderness in the suprapubic area  There is no right CVA tenderness or left CVA tenderness  Skin:     General: Skin is warm and dry  Capillary Refill: Capillary refill takes less than 2 seconds     Neurological: Mental Status: She is alert and oriented to person, place, and time  Psychiatric:         Mood and Affect: Mood normal          Behavior: Behavior normal          Thought Content:  Thought content normal          Judgment: Judgment normal

## 2020-08-26 LAB
BACTERIA UR CULT: NORMAL
Lab: NO GROWTH

## 2020-09-04 ENCOUNTER — TELEPHONE (OUTPATIENT)
Dept: FAMILY MEDICINE CLINIC | Facility: HOSPITAL | Age: 60
End: 2020-09-04

## 2020-09-04 DIAGNOSIS — I50.31 ACUTE DIASTOLIC CONGESTIVE HEART FAILURE (HCC): Primary | ICD-10-CM

## 2020-09-16 ENCOUNTER — OFFICE VISIT (OUTPATIENT)
Dept: PULMONOLOGY | Facility: HOSPITAL | Age: 60
End: 2020-09-16
Payer: COMMERCIAL

## 2020-09-16 VITALS
DIASTOLIC BLOOD PRESSURE: 70 MMHG | RESPIRATION RATE: 18 BRPM | SYSTOLIC BLOOD PRESSURE: 122 MMHG | TEMPERATURE: 97.6 F | BODY MASS INDEX: 48.9 KG/M2 | OXYGEN SATURATION: 92 % | WEIGHT: 276 LBS | HEIGHT: 63 IN | HEART RATE: 72 BPM

## 2020-09-16 DIAGNOSIS — J44.9 ASTHMA-COPD OVERLAP SYNDROME (HCC): ICD-10-CM

## 2020-09-16 DIAGNOSIS — J45.51 SEVERE PERSISTENT ASTHMA WITH EXACERBATION: Primary | ICD-10-CM

## 2020-09-16 DIAGNOSIS — R93.89 ABNORMAL CT OF THE CHEST: ICD-10-CM

## 2020-09-16 PROBLEM — J44.89 ASTHMA-COPD OVERLAP SYNDROME: Status: ACTIVE | Noted: 2020-09-16

## 2020-09-16 PROCEDURE — 99215 OFFICE O/P EST HI 40 MIN: CPT | Performed by: INTERNAL MEDICINE

## 2020-09-16 RX ORDER — AZITHROMYCIN 250 MG/1
TABLET, FILM COATED ORAL
Qty: 6 TABLET | Refills: 0 | Status: SHIPPED | OUTPATIENT
Start: 2020-09-16 | End: 2020-09-20

## 2020-09-16 RX ORDER — PREDNISONE 20 MG/1
TABLET ORAL
Qty: 25 TABLET | Refills: 0 | Status: SHIPPED | OUTPATIENT
Start: 2020-09-16 | End: 2020-10-06

## 2020-09-16 NOTE — ASSESSMENT & PLAN NOTE
Bilateral mosaic attenuation most likely representing underlying chronic bronchiolitis, versus rule out subacute hypersensitivity pneumonitis  I would like to order new pulmonary function test with 6 minutes walk, IgE, Aspergillus IgE, hypersensitivity pneumonitis panel

## 2020-09-16 NOTE — PROGRESS NOTES
Pulmonary Follow Up Note   Abril Rosenthal 62 y o  female MRN: [id number]  9/16/2020      Assessment:    Abnormal CT of the chest  Bilateral mosaic attenuation most likely representing underlying chronic bronchiolitis, versus rule out subacute hypersensitivity pneumonitis  I would like to order new pulmonary function test with 6 minutes walk, IgE, Aspergillus IgE, hypersensitivity pneumonitis panel  Asthma-COPD overlap syndrome ([facility] )  She is a maximum inhalation therapy  Might consider chronic azithromycin therapy versus Daliresp, versus starting from biologic agent based on the results from her IgE and blood work from today  Plan:    Diagnoses and all orders for this visit:    Severe persistent asthma with exacerbation  -     azithromycin (ZITHROMAX) 250 mg tablet; Take 2 tablets today then 1 tablet daily x 4 days  -     [facility] Allergy Panel, Adult; Future    Asthma-COPD overlap syndrome ([facility] )  -     Hypersensitivity pnuemonitis profile; Future  -     IgE; Future  -     Aspergillus fumagatus IgE; Future  -     Complete PFT with post Bronchodilator and Six Minute walk; Future  -     CBC and differential; Future  -     Discontinue: tiotropium (SPIRIVA RESPIMAT) 2 5 MCG/ACT AERS inhaler; Inhale 2 puffs daily  -     predniSONE 20 mg tablet; Take 2 tablets (40 mg total) by mouth daily for 5 days, THEN 1 5 tablets (30 mg total) daily for 5 days, THEN 1 tablet (20 mg total) daily for 5 days, THEN 0 5 tablets (10 mg total) daily for 5 days   -     Hypersensitivity pnuemonitis profile  -     IgE  -     Aspergillus fumagatus IgE  -     CBC and differential  -     azithromycin (ZITHROMAX) 250 mg tablet; Take 2 tablets today then 1 tablet daily x 4 days    Abnormal CT of the chest  -     Hypersensitivity pnuemonitis profile; Future  -     IgE; Future  -     Aspergillus fumagatus IgE;  Future  -     CBC and differential; Future  -     Hypersensitivity pnuemonitis profile  -     IgE  -     Aspergillus fumagatus IgE  - CBC and differential        Return in about 4 weeks (around 10/14/2020)  History of Present Illness   HPI:  Lyudmila Hicks is a 62 y o  female who has past medical history of severe persistent asthma uncontrolled symptoms especially over the last year she has been following with Dr Sandhya Mcdaniels  She tells me over the last few months she has been having very frequent episodes of shortness of breath and wheezing she has been compliant with her inhalers, but she has been using her rescue nebulizer and inhalers for the 4-5 times every day and she has frequent awakenings at night time with shortness of breath and episodes of cough, she has required steroid course of 5 days back in August of 2020  She is currently on Symbicort, Spiriva Respimat, and albuterol rescue inhaler, I reviewed the inhalation technique with her which seemed to be fairly good  She denies having pets or birds she used to have dogs in the past but not anymore, [place] and couple of her neighbors have chickens, she thinks that her basement retains water and possibly mold and she is working on getting that fixed  She is using his CPAP every night I reviewed her compliance with minimal residual AHI but she has significant leak, she does not use distilled water or any water in her CPAP machine and she does not think that she needs it  Review of Systems   Constitutional: Negative for chills, diaphoresis, fatigue and fever  HENT: Negative for congestion, postnasal drip, rhinorrhea, sinus pressure and sore throat  Eyes: Negative for redness and visual disturbance  Respiratory: Positive for cough, chest tightness, shortness of breath and wheezing  Negative for stridor  Cardiovascular: Negative for chest pain and leg swelling  Gastrointestinal: Negative for abdominal distention, abdominal pain, diarrhea and vomiting  Endocrine: Negative for polydipsia and polyphagia  Genitourinary: Negative for dysuria and hematuria  Musculoskeletal: Negative for back pain and myalgias  Skin: Negative for pallor and rash  Neurological: Negative for dizziness, weakness and headaches  Psychiatric/Behavioral: Negative for sleep disturbance  The patient is not nervous/anxious          Historical Information   Past Medical History:   Diagnosis Date    Alcohol abuse 5/21/2020    Anemia     Cervical radiculopathy     [facility]est[facility]     Chronic low back pain     Chronic obstructive asthma ([facility] ) 2/20/2018    Community acquired pneumonia     Community acquired pneumonia 5/21/2020    Diabetes mellitus ([place] [address] )     Diabetes mellitus with hyperglycemia ([facility])     Elevated liver enzymes     GERD (gastroesophageal reflux disease)     Paresthesia of upper extremity     Plantar fasciitis     Restless leg     Sexual dysfunction     Sleep apnea, obstructive     Tenosynovitis of finger     Tinea corporis     Weakness of upper extremity      Past Surgical History:   Procedure Laterality Date    ABDOMINAL SURGERY      CARPAL TUNNEL RELEASE Bilateral     CHOLECYSTECTOMY      laparoscopic    ESOPHAGOGASTRODUODENOSCOPY N/A 12/3/2018    Procedure: ESOPHAGOGASTRODUODENOSCOPY (EGD) AND COLONOSCOPY;  Surgeon: Dawood Paz MD;  [facility];  Service: Gastroenterology    GASTRIC BYPASS      for morbid obesity with gilda en y    HERNIA REPAIR      HYSTERECTOMY      NE EXCIS PRIMARY GANGLION WRIST Left 12/14/2017    Procedure: LONG FINGER GANGLION CYST EXCISION;  Surgeon: Shane Hansen MD;  [facility];  Service: Orthopedics    NE INCISE FINGER TENDON SHEATH Left 12/14/2017    Procedure: Lidia Mooney, RING, TRIGGER FINGER RELEASE;  Surgeon: Shane Hansen MD;  [facility];  Service: Orthopedics    SHOULDER SURGERY       Family History   Problem Relation Age of Onset    Alzheimer's disease Mother     Atrial fibrillation Mother     Dementia Mother     Heart disease Mother         heart problem    Seizures Mother     Parkinsonism Father     Arthritis Father     Atrial fibrillation Father     Substance Abuse Neg Hx         mother,father    Mental illness Neg Hx         mother,father         Meds/Allergies     Current Outpatient Medications:     albuterol (2 5 mg/3 mL) 0 083 % nebulizer solution, Take 1 vial (2 5 mg total) by nebulization every 4 (four) hours as needed for wheezing or shortness of breath, Disp: 120 vial, Rfl: 3    albuterol (PROVENTIL HFA,VENTOLIN HFA) 90 mcg/act inhaler, Inhale 2 puffs every 4 (four) hours as needed for wheezing, Disp: 1 Inhaler, Rfl: 4    apixaban (Eliquis) 5 mg, Take 1 tablet (5 mg total) by mouth 2 (two) times a day, Disp: 60 tablet, Rfl: 5    ascorbic acid (VITAMIN C) 1000 MG tablet, Take 1,000 mg by mouth daily  , Disp: , Rfl:     Blood Glucose Monitoring Suppl (CDC Software CONTOUR NEXT MONITOR) w/Device KIT, by Does not apply route daily, Disp: , Rfl:     Cholecalciferol 1000 units tablet, Take 1,000 Units by mouth daily  , Disp: , Rfl:     ferrous sulfate 324 (65 Fe) mg, Take 1 tablet (324 mg total) by mouth daily before breakfast, Disp: 30 tablet, Rfl: 0    flecainide (TAMBOCOR) 100 mg tablet, Take 1 tablet (100 mg total) by mouth every 12 (twelve) hours, Disp: 180 tablet, Rfl: 1    furosemide (LASIX) 40 mg tablet, Take 1 tablet (40 mg total) by mouth 2 (two) times a day, Disp: 60 tablet, Rfl: 5    glimepiride (AMARYL) 4 mg tablet, Take 1 tablet by mouth once daily with breakfast, Disp: 30 tablet, Rfl: 5    glucose blood (CONTOUR NEXT TEST) test strip, Test blood sugar 3 times a day, Disp: 100 each, Rfl: 3    LIFESCAN UNISTIK II LANCETS MISC, by Does not apply route 3 (three) times a day, Disp: 100 each, Rfl: 5    loratadine (Claritin) 10 mg tablet, Take by mouth, Disp: , Rfl:     LORazepam (ATIVAN) 0 5 mg tablet, TAKE 2 TABLETS BY MOUTH AT BEDTIME AND 1 EVERY 6 HOURS AS NEEDED FOR ANXIETY, Disp: 90 tablet, Rfl: 0    Magnesium 400 MG CAPS, Take by mouth 1 BID, Disp: , Rfl:     metFORMIN (GLUCOPHAGE-XR) 500 mg 24 hr tablet, Take 2 tablets (1,000 mg total) by mouth 2 (two) times a day with meals, Disp: 120 tablet, Rfl: 5    metoprolol succinate (TOPROL-XL) 50 mg 24 hr tablet, Take 1 tablet (50 mg total) by mouth daily, Disp: 90 tablet, Rfl: 1    naloxone (NARCAN) 4 mg/0 1 mL nasal spray, Administer 1 spray into a nostril  If breathing does not return to normal or if breathing difficulty resumes after 2-3 minutes, give another dose in the other nostril using a new spray , Disp: 1 each, Rfl: 1    omeprazole (PriLOSEC) 40 MG capsule, Take 1 capsule (40 mg total) by mouth daily, Disp: 90 capsule, Rfl: 3    potassium chloride (K-DUR,KLOR-CON) 20 mEq tablet, Take 1 tablet (20 mEq total) by mouth 2 (two) times a day, Disp: 180 tablet, Rfl: 1    Symbicort 160-4 5 MCG/ACT inhaler, Inhale 2 puffs 2 (two) times a day Rinse mouth after use, Disp: 1 Inhaler, Rfl: 11    traMADol (ULTRAM) 50 mg tablet, TAKE 2 TABLETS BY MOUTH IN THE MORNING AND 2 IN THE EVENING AS NEEDED FOR MODERATE TO SEVERE PAIN, Disp: 120 tablet, Rfl: 0    traZODone (DESYREL) 150 mg tablet, Take 1 tablet (150 mg total) by mouth daily at bedtime, Disp: 30 tablet, Rfl: 0    azithromycin (ZITHROMAX) 250 mg tablet, Take 2 tablets today then 1 tablet daily x 4 days, Disp: 6 tablet, Rfl: 0    clotrimazole (LOTRIMIN) 1 % cream, Apply topically 2 (two) times a day (Patient not taking: Reported on 8/25/2020), Disp: 30 g, Rfl: 0    predniSONE 20 mg tablet, Take 2 tablets (40 mg total) by mouth daily for 5 days, THEN 1 5 tablets (30 mg total) daily for 5 days, THEN 1 tablet (20 mg total) daily for 5 days, THEN 0 5 tablets (10 mg total) daily for 5 days  , Disp: 25 tablet, Rfl: 0    tiotropium (SPIRIVA RESPIMAT) 2 5 MCG/ACT AERS inhaler, Inhale 2 puffs daily, Disp: 2 Inhaler, Rfl: 3  Allergies   Allergen Reactions    Iron Dextran Swelling    Januvia [Sitagliptin] Shortness Of Breath    Jardiance [Empagliflozin] Shortness Of Breath    Clonazepam Other (See Comments)     Unknown reaction    Codeine Itching and Other (See Comments)     itch;mary kay morphine,takes ultram @home    Adhesive [Medical Tape]      itching    Effexor [Venlafaxine] Itching    Lactose     Oxycodone-Acetaminophen      Other reaction(s): Other (See Comments)  (PERCOCET) MILD RASH, not allergic to Acetaminophen     Oxycodone-Acetaminophen Anxiety       Vitals: Blood pressure 122/70, pulse 72, temperature 97 6 °F (36 4 °C), temperature source Tympanic, resp  rate 18, height 5' 3" (1 6 m), weight 125 kg (276 lb), SpO2 92 %  Body mass index is 48 89 kg/m²  Oxygen Therapy  SpO2: 92 %      Physical Exam  Physical Exam  Constitutional:       Appearance: Normal appearance  HENT:      Head: Normocephalic and atraumatic  Nose: No congestion or rhinorrhea  Mouth/Throat:      Mouth: Mucous membranes are moist       Pharynx: Oropharynx is clear  Eyes:      General: No scleral icterus  Right eye: No discharge  Left eye: No discharge  Pupils: Pupils are equal, round, and reactive to light  Neck:      Musculoskeletal: No neck rigidity  Cardiovascular:      Rate and Rhythm: Normal rate and regular rhythm  Pulses: Normal pulses  Heart sounds: Normal heart sounds  No murmur  No gallop  Pulmonary:      Effort: Respiratory distress present  Breath sounds: No wheezing, rhonchi or rales  Comments: She has minimal increased respiratory effort especially with ambulation, she has bilateral diminished air entry but it is equal with no significant wheezes  Abdominal:      General: Abdomen is flat  Bowel sounds are normal  There is no distension  Palpations: Abdomen is soft  Tenderness: There is no abdominal tenderness  Musculoskeletal:         General: No swelling, tenderness or deformity  Skin:     General: Skin is warm and dry  Neurological:      General: No focal deficit present  Mental Status: She is alert and oriented to person, place, and time  Mental status is at baseline  Psychiatric:         Mood and Affect: Mood normal          Behavior: Behavior normal          Thought Content: Thought content normal          Judgment: Judgment normal          Labs: I have personally reviewed pertinent lab results  , ABG: No results found for: PHART, UYV6NMA, PO2ART, GUH8WLM, I4WXLQNC, BEART, SOURCE, BNP: No results found for: BNP, CBC: No results found for: WBC, HGB, HCT, MCV, PLT, ADJUSTEDWBC, MCH, MCHC, RDW, MPV, NRBC, CMP: No results found for: SODIUM, K, CL, CO2, ANIONGAP, BUN, CREATININE, GLUCOSE, CALCIUM, AST, ALT, ALKPHOS, PROT, BILITOT, EGFR, PT/INR: No results found for: PT, INR  Lab Results   Component Value Date    WBC 14 44 (H) 05/23/2020    HGB 10 2 (L) 05/23/2020    HCT 33 7 (L) 05/23/2020    MCV 73 (L) 05/23/2020     05/23/2020     Lab Results   Component Value Date    GLUCOSE 131 (H) 09/22/2017    CALCIUM 8 3 05/23/2020     09/22/2017    K 4 1 05/23/2020    CO2 31 05/23/2020    CL 94 (L) 05/23/2020    BUN 17 05/23/2020    CREATININE 1 00 05/23/2020     No results found for: IGE  Lab Results   Component Value Date    ALT 31 05/21/2020    AST 23 05/21/2020    ALKPHOS 136 (H) 05/21/2020    BILITOT 0 4 09/22/2017       Reviewed the compliance report and its scanned on media    Imaging and other studies: I have personally reviewed pertinent films in PACS  CT chest with bilateral diffuse mosaic ground-glass attenuation    Pulmonary function testing:   Reviewed her most recent PFTs from 2018 and as detailed above in the assessment and plan would like to obtain new one         EKG, Pathology, and Other Studies: I have personally reviewed pertinent films in PACS          Renetta Sin MD  [facility]       Portions of the record may have been created with voice recognition software   Occasional wrong word or "sound a like" substitutions may have occurred due to the inherent limitations of voice recognition software  Read the chart carefully and recognize, using context, where substitutions have occurred

## 2020-09-16 NOTE — ASSESSMENT & PLAN NOTE
She is a maximum inhalation therapy  Might consider chronic azithromycin therapy versus Daliresp, versus starting from biologic agent based on the results from her IgE and blood work from today

## 2020-09-16 NOTE — PATIENT INSTRUCTIONS
Asthma   AMBULATORY CARE:   Asthma  is a lung disease that makes breathing difficult  Chronic inflammation and reactions to triggers narrow the airways in your lungs  Asthma can become life-threatening if it is not managed  Cough-variant asthma  is a type of asthma that causes a dry cough that keeps coming back  A dry cough may be your only symptom, or you may also have chest tightness  These symptoms may be caused by exercise or exposure to odors, allergens, or respiratory tract infections  Cough-variant asthma is treated the same way as typical asthma  Common symptoms include the following:   · Coughing     · Wheezing     · Shortness of breath     · Chest tightness  Seek care immediately if:   · You have severe shortness of breath  · Your lips or nails turn blue or gray  · The skin around your neck and ribs pulls in with each breath  · You have shortness of breath, even after you take your short-term medicine as directed  · Your peak flow numbers are in the red zone of your AAP  Contact your healthcare provider if:   · You run out of medicine before your next refill is due  · Your symptoms get worse  · You need to take more medicine than usual to control your symptoms  · You have questions or concerns about your condition or care  Treatment for asthma  will depend on how severe your asthma is  Medicine may decrease inflammation, open airways, and make it easier to breathe  Medicines may be inhaled, taken as a pill, or injected  Short-term medicines relieve your symptoms quickly  Long-term medicines are used to prevent future attacks  You may also need medicine to help control your allergies  Manage and prevent future asthma attacks:   · Follow your asthma action plan  This is a written plan that you and your healthcare provider create  It explains which medicine you need and when to change doses if necessary   It also explains how you can monitor symptoms and use a peak flow meter  The meter measures how well your lungs are working  · Manage other health conditions , such as allergies, acid reflux, and sleep apnea  · Identify and avoid triggers  These may include pets, dust mites, mold, and cockroaches  · Do not smoke or be around others who smoke  Nicotine and other chemicals in cigarettes and cigars can cause lung damage  Ask your healthcare provider for information if you currently smoke and need help to quit  E-cigarettes or smokeless tobacco still contain nicotine  Talk to your healthcare provider before you use these products  · Ask about the flu vaccine  The flu can make your asthma worse  You may need a yearly flu shot  Follow up with your healthcare provider as directed: You will need to return to make sure your medicine is working and your symptoms are controlled  You may be referred to an asthma or allergy specialist  [employer] may be asked to keep a record of your peak flow values and bring it with you to your appointments  Write down your questions so you remember to ask them during your visits  © 2017 [address] Information is for End User's use only and may not be sold, redistributed or otherwise used for commercial purposes  All illustrations and images included in CareNotes® are the copyrighted property of [employer]  or Yunior Harris  The above information is an  only  It is not intended as medical advice for individual conditions or treatments  Talk to your doctor, nurse or pharmacist before following any medical regimen to see if it is safe and effective for you

## 2020-09-17 ENCOUNTER — DOCUMENTATION (OUTPATIENT)
Dept: PULMONOLOGY | Facility: CLINIC | Age: 60
End: 2020-09-17

## 2020-09-18 ENCOUNTER — TELEPHONE (OUTPATIENT)
Dept: PULMONOLOGY | Facility: CLINIC | Age: 60
End: 2020-09-18

## 2020-09-18 NOTE — TELEPHONE ENCOUNTER
We received a request for a prior authorization for Spiriva Respimat but in your last office note from 9/16/20 you wanted patient to discontinue it  Can you please confirm if patient should be on Spiriva or not? Thank you

## 2020-09-21 DIAGNOSIS — G47.00 INSOMNIA, UNSPECIFIED TYPE: ICD-10-CM

## 2020-09-21 DIAGNOSIS — Z79.899 ENCOUNTER FOR LONG-TERM (CURRENT) DRUG USE: ICD-10-CM

## 2020-09-21 LAB
A FUMIGATUS IGE QN: <0.1 KU/L
A FUMIGATUS1 AB SER QL ID: NEGATIVE
A PULLULANS AB SER QL: NEGATIVE
BASOPHILS # BLD AUTO: 0.1 X10E3/UL (ref 0–0.2)
BASOPHILS NFR BLD AUTO: 1 %
EOSINOPHIL # BLD AUTO: 0.3 X10E3/UL (ref 0–0.4)
EOSINOPHIL NFR BLD AUTO: 2 %
ERYTHROCYTE [DISTWIDTH] IN BLOOD BY AUTOMATED COUNT: 16.2 % (ref 11.7–15.4)
HCT VFR BLD AUTO: 36.7 % (ref 34–46.6)
HGB BLD-MCNC: 11 G/DL (ref 11.1–15.9)
IGE SERPL-ACNC: 55 IU/ML (ref 6–495)
IMM GRANULOCYTES # BLD: 0 X10E3/UL (ref 0–0.1)
IMM GRANULOCYTES NFR BLD: 0 %
LACEYELLA SACCHARI AB SER QL: NEGATIVE
LYMPHOCYTES # BLD AUTO: 1.6 X10E3/UL (ref 0.7–3.1)
LYMPHOCYTES NFR BLD AUTO: 14 %
MCH RBC QN AUTO: 21.7 PG (ref 26.6–33)
MCHC RBC AUTO-ENTMCNC: 30 G/DL (ref 31.5–35.7)
MCV RBC AUTO: 73 FL (ref 79–97)
MONOCYTES # BLD AUTO: 0.8 X10E3/UL (ref 0.1–0.9)
MONOCYTES NFR BLD AUTO: 7 %
NEUTROPHILS # BLD AUTO: 9 X10E3/UL (ref 1.4–7)
NEUTROPHILS NFR BLD AUTO: 76 %
PIGEON SERUM AB QL ID: NEGATIVE
PLATELET # BLD AUTO: 395 X10E3/UL (ref 150–450)
RBC # BLD AUTO: 5.06 X10E6/UL (ref 3.77–5.28)
S RECTIVIRGULA AB SER QL ID: NEGATIVE
T VULGARIS AB SER QL ID: NEGATIVE
WBC # BLD AUTO: 11.7 X10E3/UL (ref 3.4–10.8)

## 2020-09-21 RX ORDER — TRAZODONE HYDROCHLORIDE 150 MG/1
150 TABLET ORAL
Qty: 30 TABLET | Refills: 0 | Status: SHIPPED | OUTPATIENT
Start: 2020-09-21 | End: 2020-10-19 | Stop reason: SDUPTHER

## 2020-09-21 RX ORDER — LORAZEPAM 0.5 MG/1
TABLET ORAL
Qty: 90 TABLET | Refills: 0 | Status: SHIPPED | OUTPATIENT
Start: 2020-09-21 | End: 2020-10-19 | Stop reason: SDUPTHER

## 2020-09-23 ENCOUNTER — TELEPHONE (OUTPATIENT)
Dept: PULMONOLOGY | Facility: HOSPITAL | Age: 60
End: 2020-09-23

## 2020-09-23 DIAGNOSIS — J45.51 SEVERE PERSISTENT ASTHMA WITH ACUTE EXACERBATION: Primary | ICD-10-CM

## 2020-09-23 RX ORDER — MONTELUKAST SODIUM 10 MG/1
10 TABLET ORAL
Qty: 30 TABLET | Refills: 0 | Status: SHIPPED | OUTPATIENT
Start: 2020-09-23 | End: 2020-11-04 | Stop reason: SDUPTHER

## 2020-09-23 RX ORDER — FLUTICASONE PROPIONATE 50 MCG
1 SPRAY, SUSPENSION (ML) NASAL 2 TIMES DAILY
Qty: 1 BOTTLE | Refills: 3 | Status: SHIPPED | OUTPATIENT
Start: 2020-09-23

## 2020-09-23 NOTE — TELEPHONE ENCOUNTER
Prior Authorization has been approved from 9/21/20 until 9/21/2021  Pt's pharmacy has been notified as well as patient   Patient's copay is $3 00

## 2020-09-23 NOTE — TELEPHONE ENCOUNTER
Call the patient back updated about the results of blood work, she still feels short of breath and using  her rescue nebulizer and inhaler  I would like to start her on Flonase nasal spray for allergic rhinitis and to help with the postnasal drip  I would also like to add Singulair 10 mg qhs  Medications escribed to her pharmacy

## 2020-10-14 ENCOUNTER — HOSPITAL ENCOUNTER (OUTPATIENT)
Dept: PULMONOLOGY | Facility: HOSPITAL | Age: 60
Discharge: HOME | End: 2020-10-14
Attending: INTERNAL MEDICINE
Payer: COMMERCIAL

## 2020-10-14 DIAGNOSIS — J44.9 ASTHMA-COPD OVERLAP SYNDROME (HCC): ICD-10-CM

## 2020-10-14 PROCEDURE — 94010 BREATHING CAPACITY TEST: CPT

## 2020-10-14 PROCEDURE — 94010 BREATHING CAPACITY TEST: CPT | Performed by: INTERNAL MEDICINE

## 2020-10-14 PROCEDURE — 94726 PLETHYSMOGRAPHY LUNG VOLUMES: CPT | Performed by: INTERNAL MEDICINE

## 2020-10-14 PROCEDURE — 94761 N-INVAS EAR/PLS OXIMETRY MLT: CPT

## 2020-10-14 PROCEDURE — 94729 DIFFUSING CAPACITY: CPT | Performed by: INTERNAL MEDICINE

## 2020-10-14 PROCEDURE — 94726 PLETHYSMOGRAPHY LUNG VOLUMES: CPT

## 2020-10-14 PROCEDURE — 94729 DIFFUSING CAPACITY: CPT

## 2020-10-14 PROCEDURE — 94618 PULMONARY STRESS TESTING: CPT | Performed by: INTERNAL MEDICINE

## 2020-10-16 ENCOUNTER — OFFICE VISIT (OUTPATIENT)
Dept: PULMONOLOGY | Facility: HOSPITAL | Age: 60
End: 2020-10-16
Payer: COMMERCIAL

## 2020-10-16 VITALS
WEIGHT: 276 LBS | BODY MASS INDEX: 48.9 KG/M2 | TEMPERATURE: 96.7 F | OXYGEN SATURATION: 86 % | SYSTOLIC BLOOD PRESSURE: 100 MMHG | DIASTOLIC BLOOD PRESSURE: 66 MMHG | HEART RATE: 88 BPM | HEIGHT: 63 IN

## 2020-10-16 DIAGNOSIS — G47.33 OBSTRUCTIVE SLEEP APNEA: ICD-10-CM

## 2020-10-16 DIAGNOSIS — J96.11 CHRONIC RESPIRATORY FAILURE WITH HYPOXIA (HCC): ICD-10-CM

## 2020-10-16 DIAGNOSIS — J45.50 SEVERE PERSISTENT ASTHMA, UNSPECIFIED WHETHER COMPLICATED: Primary | ICD-10-CM

## 2020-10-16 DIAGNOSIS — J45.51 SEVERE PERSISTENT ASTHMA WITH EXACERBATION: ICD-10-CM

## 2020-10-16 PROCEDURE — 1036F TOBACCO NON-USER: CPT | Performed by: INTERNAL MEDICINE

## 2020-10-16 PROCEDURE — 3078F DIAST BP <80 MM HG: CPT | Performed by: INTERNAL MEDICINE

## 2020-10-16 PROCEDURE — 99214 OFFICE O/P EST MOD 30 MIN: CPT | Performed by: INTERNAL MEDICINE

## 2020-10-16 RX ORDER — UBIDECARENONE 75 MG
CAPSULE ORAL DAILY
COMMUNITY

## 2020-10-19 ENCOUNTER — DOCUMENTATION (OUTPATIENT)
Dept: PULMONOLOGY | Facility: CLINIC | Age: 60
End: 2020-10-19

## 2020-10-19 DIAGNOSIS — G47.00 INSOMNIA, UNSPECIFIED TYPE: ICD-10-CM

## 2020-10-19 DIAGNOSIS — J44.9 CHRONIC OBSTRUCTIVE PULMONARY DISEASE, UNSPECIFIED COPD TYPE (HCC): ICD-10-CM

## 2020-10-19 DIAGNOSIS — D50.9 MICROCYTIC ANEMIA: ICD-10-CM

## 2020-10-19 DIAGNOSIS — Z79.899 ENCOUNTER FOR LONG-TERM (CURRENT) DRUG USE: ICD-10-CM

## 2020-10-21 RX ORDER — LORAZEPAM 0.5 MG/1
TABLET ORAL
Qty: 90 TABLET | Refills: 0 | Status: SHIPPED | OUTPATIENT
Start: 2020-10-21 | End: 2020-11-20 | Stop reason: SDUPTHER

## 2020-10-21 RX ORDER — METFORMIN HYDROCHLORIDE 500 MG/1
1000 TABLET, EXTENDED RELEASE ORAL 2 TIMES DAILY WITH MEALS
Qty: 120 TABLET | Refills: 5 | Status: SHIPPED | OUTPATIENT
Start: 2020-10-21 | End: 2021-05-05 | Stop reason: SDUPTHER

## 2020-10-21 RX ORDER — TRAZODONE HYDROCHLORIDE 150 MG/1
150 TABLET ORAL
Qty: 30 TABLET | Refills: 0 | Status: SHIPPED | OUTPATIENT
Start: 2020-10-21 | End: 2020-10-28

## 2020-10-21 RX ORDER — FERROUS SULFATE TAB EC 324 MG (65 MG FE EQUIVALENT) 324 (65 FE) MG
324 TABLET DELAYED RESPONSE ORAL
Qty: 30 TABLET | Refills: 0 | Status: SHIPPED | OUTPATIENT
Start: 2020-10-21 | End: 2021-01-13 | Stop reason: SDUPTHER

## 2020-10-28 ENCOUNTER — OFFICE VISIT (OUTPATIENT)
Dept: FAMILY MEDICINE CLINIC | Facility: HOSPITAL | Age: 60
End: 2020-10-28
Payer: COMMERCIAL

## 2020-10-28 VITALS
DIASTOLIC BLOOD PRESSURE: 80 MMHG | BODY MASS INDEX: 50.25 KG/M2 | HEART RATE: 87 BPM | HEIGHT: 63 IN | TEMPERATURE: 96.1 F | WEIGHT: 283.6 LBS | OXYGEN SATURATION: 96 % | SYSTOLIC BLOOD PRESSURE: 140 MMHG

## 2020-10-28 DIAGNOSIS — G47.10 HYPERSOMNOLENCE: ICD-10-CM

## 2020-10-28 DIAGNOSIS — B35.4 TINEA CORPORIS: ICD-10-CM

## 2020-10-28 DIAGNOSIS — J44.9 ASTHMA-COPD OVERLAP SYNDROME (HCC): Primary | ICD-10-CM

## 2020-10-28 DIAGNOSIS — I48.91 ATRIAL FIBRILLATION, UNSPECIFIED TYPE (HCC): ICD-10-CM

## 2020-10-28 DIAGNOSIS — G47.00 INSOMNIA, UNSPECIFIED TYPE: ICD-10-CM

## 2020-10-28 DIAGNOSIS — E11.9 TYPE 2 DIABETES MELLITUS WITHOUT COMPLICATION, WITHOUT LONG-TERM CURRENT USE OF INSULIN (HCC): ICD-10-CM

## 2020-10-28 DIAGNOSIS — F32.1 CURRENT MODERATE EPISODE OF MAJOR DEPRESSIVE DISORDER WITHOUT PRIOR EPISODE (HCC): ICD-10-CM

## 2020-10-28 DIAGNOSIS — I10 BENIGN ESSENTIAL HYPERTENSION: ICD-10-CM

## 2020-10-28 LAB — SL AMB POCT HEMOGLOBIN AIC: 8.4 (ref ?–6.5)

## 2020-10-28 PROCEDURE — 3052F HG A1C>EQUAL 8.0%<EQUAL 9.0%: CPT | Performed by: NURSE PRACTITIONER

## 2020-10-28 PROCEDURE — 83036 HEMOGLOBIN GLYCOSYLATED A1C: CPT | Performed by: FAMILY MEDICINE

## 2020-10-28 PROCEDURE — 99214 OFFICE O/P EST MOD 30 MIN: CPT | Performed by: FAMILY MEDICINE

## 2020-10-28 PROCEDURE — 3725F SCREEN DEPRESSION PERFORMED: CPT | Performed by: FAMILY MEDICINE

## 2020-10-28 RX ORDER — TRAZODONE HYDROCHLORIDE 100 MG/1
100 TABLET ORAL
Qty: 30 TABLET | Refills: 0 | Status: SHIPPED | OUTPATIENT
Start: 2020-10-28 | End: 2020-11-04 | Stop reason: SDUPTHER

## 2020-10-28 RX ORDER — TERBINAFINE HYDROCHLORIDE 250 MG/1
250 TABLET ORAL DAILY
Qty: 14 TABLET | Refills: 0 | Status: SHIPPED | OUTPATIENT
Start: 2020-10-28 | End: 2020-11-16 | Stop reason: HOSPADM

## 2020-11-04 ENCOUNTER — OFFICE VISIT (OUTPATIENT)
Dept: FAMILY MEDICINE CLINIC | Facility: HOSPITAL | Age: 60
End: 2020-11-04
Payer: COMMERCIAL

## 2020-11-04 VITALS
BODY MASS INDEX: 50.57 KG/M2 | HEIGHT: 63 IN | HEART RATE: 82 BPM | TEMPERATURE: 96 F | OXYGEN SATURATION: 94 % | DIASTOLIC BLOOD PRESSURE: 80 MMHG | SYSTOLIC BLOOD PRESSURE: 122 MMHG | WEIGHT: 285.4 LBS

## 2020-11-04 DIAGNOSIS — J45.50 SEVERE PERSISTENT ASTHMA, UNSPECIFIED WHETHER COMPLICATED: ICD-10-CM

## 2020-11-04 DIAGNOSIS — F17.210 CIGARETTE NICOTINE DEPENDENCE WITHOUT COMPLICATION: ICD-10-CM

## 2020-11-04 DIAGNOSIS — J96.11 CHRONIC RESPIRATORY FAILURE WITH HYPOXIA (HCC): ICD-10-CM

## 2020-11-04 DIAGNOSIS — J45.51 SEVERE PERSISTENT ASTHMA WITH ACUTE EXACERBATION: ICD-10-CM

## 2020-11-04 DIAGNOSIS — Z23 ENCOUNTER FOR IMMUNIZATION: ICD-10-CM

## 2020-11-04 DIAGNOSIS — F32.1 CURRENT MODERATE EPISODE OF MAJOR DEPRESSIVE DISORDER WITHOUT PRIOR EPISODE (HCC): Primary | ICD-10-CM

## 2020-11-04 DIAGNOSIS — G47.00 INSOMNIA, UNSPECIFIED TYPE: ICD-10-CM

## 2020-11-04 PROBLEM — G47.10 HYPERSOMNOLENCE: Status: RESOLVED | Noted: 2020-10-28 | Resolved: 2020-11-04

## 2020-11-04 PROBLEM — E87.6 HYPOKALEMIA: Status: RESOLVED | Noted: 2018-12-04 | Resolved: 2020-11-04

## 2020-11-04 PROBLEM — F10.10 ALCOHOL ABUSE: Status: RESOLVED | Noted: 2020-05-21 | Resolved: 2020-11-04

## 2020-11-04 PROCEDURE — 90732 PPSV23 VACC 2 YRS+ SUBQ/IM: CPT

## 2020-11-04 PROCEDURE — 90471 IMMUNIZATION ADMIN: CPT

## 2020-11-04 PROCEDURE — 99214 OFFICE O/P EST MOD 30 MIN: CPT | Performed by: FAMILY MEDICINE

## 2020-11-04 RX ORDER — CITALOPRAM 20 MG/1
20 TABLET ORAL DAILY
Qty: 30 TABLET | Refills: 1 | Status: SHIPPED | OUTPATIENT
Start: 2020-11-04 | End: 2021-01-13 | Stop reason: SDUPTHER

## 2020-11-04 RX ORDER — MONTELUKAST SODIUM 10 MG/1
10 TABLET ORAL
Qty: 90 TABLET | Refills: 3 | Status: SHIPPED | OUTPATIENT
Start: 2020-11-04 | End: 2021-11-30 | Stop reason: SDUPTHER

## 2020-11-04 RX ORDER — TRAZODONE HYDROCHLORIDE 100 MG/1
50 TABLET ORAL
Qty: 30 TABLET | Refills: 0 | Status: SHIPPED | OUTPATIENT
Start: 2020-11-04 | End: 2020-12-04

## 2020-11-12 ENCOUNTER — APPOINTMENT (EMERGENCY)
Dept: RADIOLOGY | Facility: HOSPITAL | Age: 60
DRG: 194 | End: 2020-11-12
Payer: COMMERCIAL

## 2020-11-12 ENCOUNTER — HOSPITAL ENCOUNTER (INPATIENT)
Facility: HOSPITAL | Age: 60
LOS: 4 days | Discharge: HOME HEALTH | DRG: 194 | End: 2020-11-16
Attending: EMERGENCY MEDICINE | Admitting: INTERNAL MEDICINE
Payer: COMMERCIAL

## 2020-11-12 DIAGNOSIS — I50.9 CHF (CONGESTIVE HEART FAILURE) (HCC): ICD-10-CM

## 2020-11-12 DIAGNOSIS — J96.11 CHRONIC RESPIRATORY FAILURE WITH HYPOXIA, ON HOME OXYGEN THERAPY (HCC): ICD-10-CM

## 2020-11-12 DIAGNOSIS — J96.11 CHRONIC RESPIRATORY FAILURE WITH HYPOXIA (HCC): ICD-10-CM

## 2020-11-12 DIAGNOSIS — J44.1 ASTHMA WITH COPD WITH EXACERBATION (HCC): ICD-10-CM

## 2020-11-12 DIAGNOSIS — Z99.81 CHRONIC RESPIRATORY FAILURE WITH HYPOXIA, ON HOME OXYGEN THERAPY (HCC): ICD-10-CM

## 2020-11-12 DIAGNOSIS — R06.02 SOB (SHORTNESS OF BREATH): ICD-10-CM

## 2020-11-12 DIAGNOSIS — J45.901 ASTHMA WITH COPD WITH EXACERBATION (HCC): ICD-10-CM

## 2020-11-12 DIAGNOSIS — E66.9 OBESITY: ICD-10-CM

## 2020-11-12 DIAGNOSIS — E11.9 TYPE 2 DIABETES MELLITUS, WITHOUT LONG-TERM CURRENT USE OF INSULIN (HCC): ICD-10-CM

## 2020-11-12 DIAGNOSIS — I48.0 PAROXYSMAL ATRIAL FIBRILLATION (HCC): ICD-10-CM

## 2020-11-12 DIAGNOSIS — J44.1 COPD WITH ACUTE EXACERBATION (HCC): Primary | ICD-10-CM

## 2020-11-12 PROBLEM — J96.21 ACUTE AND CHRONIC RESPIRATORY FAILURE WITH HYPOXIA (HCC): Status: ACTIVE | Noted: 2020-11-12

## 2020-11-12 LAB
ALBUMIN SERPL BCP-MCNC: 3.4 G/DL (ref 3.5–5)
ALP SERPL-CCNC: 152 U/L (ref 46–116)
ALT SERPL W P-5'-P-CCNC: 77 U/L (ref 12–78)
ANION GAP SERPL CALCULATED.3IONS-SCNC: 7 MMOL/L (ref 4–13)
AST SERPL W P-5'-P-CCNC: 28 U/L (ref 5–45)
BASOPHILS # BLD AUTO: 0.04 THOUSANDS/ΜL (ref 0–0.1)
BASOPHILS NFR BLD AUTO: 0 % (ref 0–1)
BILIRUB SERPL-MCNC: 0.5 MG/DL (ref 0.2–1)
BUN SERPL-MCNC: 6 MG/DL (ref 5–25)
CALCIUM ALBUM COR SERPL-MCNC: 8.9 MG/DL (ref 8.3–10.1)
CALCIUM SERPL-MCNC: 8.4 MG/DL (ref 8.3–10.1)
CHLORIDE SERPL-SCNC: 98 MMOL/L (ref 100–108)
CLARITY, POC: NORMAL
CO2 SERPL-SCNC: 34 MMOL/L (ref 21–32)
COLOR, POC: YELLOW
CREAT SERPL-MCNC: 0.7 MG/DL (ref 0.6–1.3)
EOSINOPHIL # BLD AUTO: 0.16 THOUSAND/ΜL (ref 0–0.61)
EOSINOPHIL NFR BLD AUTO: 2 % (ref 0–6)
ERYTHROCYTE [DISTWIDTH] IN BLOOD BY AUTOMATED COUNT: 19.6 % (ref 11.6–15.1)
EXT BILIRUBIN, UA: NEGATIVE
EXT BLOOD URINE: NEGATIVE
EXT GLUCOSE, UA: NEGATIVE
EXT KETONES: NEGATIVE
EXT NITRITE, UA: NEGATIVE
EXT PH, UA: 5
EXT PROTEIN, UA: NEGATIVE
EXT SPECIFIC GRAVITY, UA: 1.02
EXT UROBILINOGEN: 0.2
FLUAV RNA RESP QL NAA+PROBE: NEGATIVE
FLUBV RNA RESP QL NAA+PROBE: NEGATIVE
GFR SERPL CREATININE-BSD FRML MDRD: 94 ML/MIN/1.73SQ M
GLUCOSE SERPL-MCNC: 191 MG/DL (ref 65–140)
GLUCOSE SERPL-MCNC: 311 MG/DL (ref 65–140)
HCT VFR BLD AUTO: 35.2 % (ref 34.8–46.1)
HGB BLD-MCNC: 10.1 G/DL (ref 11.5–15.4)
IMM GRANULOCYTES # BLD AUTO: 0.08 THOUSAND/UL (ref 0–0.2)
IMM GRANULOCYTES NFR BLD AUTO: 1 % (ref 0–2)
LYMPHOCYTES # BLD AUTO: 0.97 THOUSANDS/ΜL (ref 0.6–4.47)
LYMPHOCYTES NFR BLD AUTO: 10 % (ref 14–44)
MCH RBC QN AUTO: 21.6 PG (ref 26.8–34.3)
MCHC RBC AUTO-ENTMCNC: 28.7 G/DL (ref 31.4–37.4)
MCV RBC AUTO: 75 FL (ref 82–98)
MONOCYTES # BLD AUTO: 0.66 THOUSAND/ΜL (ref 0.17–1.22)
MONOCYTES NFR BLD AUTO: 7 % (ref 4–12)
NEUTROPHILS # BLD AUTO: 8.24 THOUSANDS/ΜL (ref 1.85–7.62)
NEUTS SEG NFR BLD AUTO: 80 % (ref 43–75)
NRBC BLD AUTO-RTO: 0 /100 WBCS
NT-PROBNP SERPL-MCNC: 269 PG/ML
PLATELET # BLD AUTO: 323 THOUSANDS/UL (ref 149–390)
PMV BLD AUTO: 11.2 FL (ref 8.9–12.7)
POTASSIUM SERPL-SCNC: 3.6 MMOL/L (ref 3.5–5.3)
PROT SERPL-MCNC: 6.6 G/DL (ref 6.4–8.2)
RBC # BLD AUTO: 4.68 MILLION/UL (ref 3.81–5.12)
RSV RNA RESP QL NAA+PROBE: NEGATIVE
SARS-COV-2 RNA RESP QL NAA+PROBE: NEGATIVE
SODIUM SERPL-SCNC: 139 MMOL/L (ref 136–145)
TROPONIN I SERPL-MCNC: <0.02 NG/ML
WBC # BLD AUTO: 10.15 THOUSAND/UL (ref 4.31–10.16)
WBC # BLD EST: NEGATIVE 10*3/UL

## 2020-11-12 PROCEDURE — 83880 ASSAY OF NATRIURETIC PEPTIDE: CPT | Performed by: EMERGENCY MEDICINE

## 2020-11-12 PROCEDURE — 94640 AIRWAY INHALATION TREATMENT: CPT

## 2020-11-12 PROCEDURE — 99222 1ST HOSP IP/OBS MODERATE 55: CPT | Performed by: INTERNAL MEDICINE

## 2020-11-12 PROCEDURE — 81002 URINALYSIS NONAUTO W/O SCOPE: CPT | Performed by: EMERGENCY MEDICINE

## 2020-11-12 PROCEDURE — 99285 EMERGENCY DEPT VISIT HI MDM: CPT

## 2020-11-12 PROCEDURE — 82948 REAGENT STRIP/BLOOD GLUCOSE: CPT

## 2020-11-12 PROCEDURE — 94660 CPAP INITIATION&MGMT: CPT

## 2020-11-12 PROCEDURE — 85025 COMPLETE CBC W/AUTO DIFF WBC: CPT | Performed by: EMERGENCY MEDICINE

## 2020-11-12 PROCEDURE — 36415 COLL VENOUS BLD VENIPUNCTURE: CPT | Performed by: EMERGENCY MEDICINE

## 2020-11-12 PROCEDURE — 94760 N-INVAS EAR/PLS OXIMETRY 1: CPT

## 2020-11-12 PROCEDURE — 0241U HB NFCT DS VIR RESP RNA 4 TRGT: CPT | Performed by: EMERGENCY MEDICINE

## 2020-11-12 PROCEDURE — 96374 THER/PROPH/DIAG INJ IV PUSH: CPT

## 2020-11-12 PROCEDURE — 84484 ASSAY OF TROPONIN QUANT: CPT | Performed by: EMERGENCY MEDICINE

## 2020-11-12 PROCEDURE — 99285 EMERGENCY DEPT VISIT HI MDM: CPT | Performed by: EMERGENCY MEDICINE

## 2020-11-12 PROCEDURE — 99255 IP/OBS CONSLTJ NEW/EST HI 80: CPT | Performed by: INTERNAL MEDICINE

## 2020-11-12 PROCEDURE — 93005 ELECTROCARDIOGRAM TRACING: CPT

## 2020-11-12 PROCEDURE — 71045 X-RAY EXAM CHEST 1 VIEW: CPT

## 2020-11-12 PROCEDURE — 80053 COMPREHEN METABOLIC PANEL: CPT | Performed by: EMERGENCY MEDICINE

## 2020-11-12 PROCEDURE — 94664 DEMO&/EVAL PT USE INHALER: CPT

## 2020-11-12 RX ORDER — METOPROLOL SUCCINATE 50 MG/1
50 TABLET, EXTENDED RELEASE ORAL DAILY
Status: DISCONTINUED | OUTPATIENT
Start: 2020-11-13 | End: 2020-11-16 | Stop reason: HOSPADM

## 2020-11-12 RX ORDER — FLUTICASONE FUROATE AND VILANTEROL 100; 25 UG/1; UG/1
1 POWDER RESPIRATORY (INHALATION) DAILY
Status: DISCONTINUED | OUTPATIENT
Start: 2020-11-13 | End: 2020-11-16 | Stop reason: HOSPADM

## 2020-11-12 RX ORDER — MELATONIN
1000 DAILY
Status: DISCONTINUED | OUTPATIENT
Start: 2020-11-13 | End: 2020-11-16 | Stop reason: HOSPADM

## 2020-11-12 RX ORDER — FLUTICASONE PROPIONATE 50 MCG
1 SPRAY, SUSPENSION (ML) NASAL 2 TIMES DAILY
Status: DISCONTINUED | OUTPATIENT
Start: 2020-11-12 | End: 2020-11-16 | Stop reason: HOSPADM

## 2020-11-12 RX ORDER — FLECAINIDE ACETATE 100 MG/1
100 TABLET ORAL EVERY 12 HOURS SCHEDULED
Status: DISCONTINUED | OUTPATIENT
Start: 2020-11-12 | End: 2020-11-16 | Stop reason: HOSPADM

## 2020-11-12 RX ORDER — POTASSIUM CHLORIDE 20 MEQ/1
20 TABLET, EXTENDED RELEASE ORAL 2 TIMES DAILY
Status: DISCONTINUED | OUTPATIENT
Start: 2020-11-12 | End: 2020-11-16 | Stop reason: HOSPADM

## 2020-11-12 RX ORDER — ASCORBIC ACID 500 MG
1000 TABLET ORAL DAILY
Status: DISCONTINUED | OUTPATIENT
Start: 2020-11-13 | End: 2020-11-16 | Stop reason: HOSPADM

## 2020-11-12 RX ORDER — ALBUTEROL SULFATE 90 UG/1
2 AEROSOL, METERED RESPIRATORY (INHALATION) EVERY 4 HOURS PRN
Status: DISCONTINUED | OUTPATIENT
Start: 2020-11-12 | End: 2020-11-16 | Stop reason: HOSPADM

## 2020-11-12 RX ORDER — METHYLPREDNISOLONE SODIUM SUCCINATE 40 MG/ML
40 INJECTION, POWDER, LYOPHILIZED, FOR SOLUTION INTRAMUSCULAR; INTRAVENOUS EVERY 12 HOURS SCHEDULED
Status: COMPLETED | OUTPATIENT
Start: 2020-11-12 | End: 2020-11-13

## 2020-11-12 RX ORDER — CITALOPRAM 20 MG/1
20 TABLET ORAL DAILY
Status: DISCONTINUED | OUTPATIENT
Start: 2020-11-13 | End: 2020-11-16 | Stop reason: HOSPADM

## 2020-11-12 RX ORDER — FUROSEMIDE 10 MG/ML
40 INJECTION INTRAMUSCULAR; INTRAVENOUS ONCE
Status: DISCONTINUED | OUTPATIENT
Start: 2020-11-12 | End: 2020-11-12

## 2020-11-12 RX ORDER — METFORMIN HYDROCHLORIDE 500 MG/1
1000 TABLET, EXTENDED RELEASE ORAL 2 TIMES DAILY WITH MEALS
Status: DISCONTINUED | OUTPATIENT
Start: 2020-11-12 | End: 2020-11-16 | Stop reason: HOSPADM

## 2020-11-12 RX ORDER — LORAZEPAM 1 MG/1
1 TABLET ORAL
Status: DISCONTINUED | OUTPATIENT
Start: 2020-11-12 | End: 2020-11-16 | Stop reason: HOSPADM

## 2020-11-12 RX ORDER — IPRATROPIUM BROMIDE AND ALBUTEROL SULFATE 2.5; .5 MG/3ML; MG/3ML
3 SOLUTION RESPIRATORY (INHALATION) ONCE
Status: COMPLETED | OUTPATIENT
Start: 2020-11-12 | End: 2020-11-12

## 2020-11-12 RX ORDER — METHYLPREDNISOLONE SODIUM SUCCINATE 125 MG/2ML
125 INJECTION, POWDER, LYOPHILIZED, FOR SOLUTION INTRAMUSCULAR; INTRAVENOUS ONCE
Status: COMPLETED | OUTPATIENT
Start: 2020-11-12 | End: 2020-11-12

## 2020-11-12 RX ORDER — LEVALBUTEROL 1.25 MG/.5ML
1.25 SOLUTION, CONCENTRATE RESPIRATORY (INHALATION)
Status: DISCONTINUED | OUTPATIENT
Start: 2020-11-12 | End: 2020-11-14

## 2020-11-12 RX ORDER — FUROSEMIDE 40 MG/1
40 TABLET ORAL 2 TIMES DAILY
Status: DISCONTINUED | OUTPATIENT
Start: 2020-11-12 | End: 2020-11-12

## 2020-11-12 RX ORDER — FUROSEMIDE 10 MG/ML
40 INJECTION INTRAMUSCULAR; INTRAVENOUS
Status: COMPLETED | OUTPATIENT
Start: 2020-11-12 | End: 2020-11-14

## 2020-11-12 RX ORDER — PANTOPRAZOLE SODIUM 40 MG/1
40 TABLET, DELAYED RELEASE ORAL
Status: DISCONTINUED | OUTPATIENT
Start: 2020-11-13 | End: 2020-11-16 | Stop reason: HOSPADM

## 2020-11-12 RX ORDER — MONTELUKAST SODIUM 10 MG/1
10 TABLET ORAL
Status: DISCONTINUED | OUTPATIENT
Start: 2020-11-12 | End: 2020-11-16 | Stop reason: HOSPADM

## 2020-11-12 RX ORDER — INSULIN GLARGINE 100 [IU]/ML
10 INJECTION, SOLUTION SUBCUTANEOUS
Status: DISCONTINUED | OUTPATIENT
Start: 2020-11-12 | End: 2020-11-13

## 2020-11-12 RX ORDER — TRAZODONE HYDROCHLORIDE 50 MG/1
50 TABLET ORAL
Status: DISCONTINUED | OUTPATIENT
Start: 2020-11-12 | End: 2020-11-16 | Stop reason: HOSPADM

## 2020-11-12 RX ADMIN — POTASSIUM CHLORIDE 20 MEQ: 1500 TABLET, EXTENDED RELEASE ORAL at 18:46

## 2020-11-12 RX ADMIN — FUROSEMIDE 40 MG: 10 INJECTION, SOLUTION INTRAMUSCULAR; INTRAVENOUS at 18:49

## 2020-11-12 RX ADMIN — TRAZODONE HYDROCHLORIDE 50 MG: 50 TABLET ORAL at 23:38

## 2020-11-12 RX ADMIN — IPRATROPIUM BROMIDE 0.5 MG: 0.5 SOLUTION RESPIRATORY (INHALATION) at 20:09

## 2020-11-12 RX ADMIN — LORAZEPAM 1 MG: 1 TABLET ORAL at 23:38

## 2020-11-12 RX ADMIN — APIXABAN 5 MG: 5 TABLET, FILM COATED ORAL at 18:46

## 2020-11-12 RX ADMIN — METHYLPREDNISOLONE SODIUM SUCCINATE 125 MG: 125 INJECTION, POWDER, FOR SOLUTION INTRAMUSCULAR; INTRAVENOUS at 13:04

## 2020-11-12 RX ADMIN — INSULIN LISPRO 8 UNITS: 100 INJECTION, SOLUTION INTRAVENOUS; SUBCUTANEOUS at 19:56

## 2020-11-12 RX ADMIN — MONTELUKAST SODIUM 10 MG: 10 TABLET, FILM COATED ORAL at 23:38

## 2020-11-12 RX ADMIN — IPRATROPIUM BROMIDE AND ALBUTEROL SULFATE 3 ML: 2.5; .5 SOLUTION RESPIRATORY (INHALATION) at 13:04

## 2020-11-12 RX ADMIN — METFORMIN ER 500 MG 1000 MG: 500 TABLET ORAL at 18:46

## 2020-11-12 RX ADMIN — FLECAINIDE ACETATE 100 MG: 100 TABLET ORAL at 23:39

## 2020-11-12 RX ADMIN — LEVALBUTEROL HYDROCHLORIDE 1.25 MG: 1.25 SOLUTION, CONCENTRATE RESPIRATORY (INHALATION) at 20:09

## 2020-11-12 RX ADMIN — IPRATROPIUM BROMIDE 0.5 MG: 0.5 SOLUTION RESPIRATORY (INHALATION) at 16:47

## 2020-11-12 RX ADMIN — METHYLPREDNISOLONE SODIUM SUCCINATE 40 MG: 40 INJECTION, POWDER, FOR SOLUTION INTRAMUSCULAR; INTRAVENOUS at 23:39

## 2020-11-12 RX ADMIN — LEVALBUTEROL HYDROCHLORIDE 1.25 MG: 1.25 SOLUTION, CONCENTRATE RESPIRATORY (INHALATION) at 16:47

## 2020-11-13 ENCOUNTER — APPOINTMENT (INPATIENT)
Dept: NON INVASIVE DIAGNOSTICS | Facility: HOSPITAL | Age: 60
DRG: 194 | End: 2020-11-13
Payer: COMMERCIAL

## 2020-11-13 LAB
ANION GAP SERPL CALCULATED.3IONS-SCNC: 6 MMOL/L (ref 4–13)
ATRIAL RATE: 84 BPM
BASOPHILS # BLD AUTO: 0.02 THOUSANDS/ΜL (ref 0–0.1)
BASOPHILS NFR BLD AUTO: 0 % (ref 0–1)
BUN SERPL-MCNC: 13 MG/DL (ref 5–25)
CALCIUM SERPL-MCNC: 8.7 MG/DL (ref 8.3–10.1)
CHLORIDE SERPL-SCNC: 96 MMOL/L (ref 100–108)
CO2 SERPL-SCNC: 34 MMOL/L (ref 21–32)
CREAT SERPL-MCNC: 0.68 MG/DL (ref 0.6–1.3)
EOSINOPHIL # BLD AUTO: 0 THOUSAND/ΜL (ref 0–0.61)
EOSINOPHIL NFR BLD AUTO: 0 % (ref 0–6)
ERYTHROCYTE [DISTWIDTH] IN BLOOD BY AUTOMATED COUNT: 19.5 % (ref 11.6–15.1)
GFR SERPL CREATININE-BSD FRML MDRD: 95 ML/MIN/1.73SQ M
GLUCOSE SERPL-MCNC: 188 MG/DL (ref 65–140)
GLUCOSE SERPL-MCNC: 262 MG/DL (ref 65–140)
GLUCOSE SERPL-MCNC: 276 MG/DL (ref 65–140)
GLUCOSE SERPL-MCNC: 306 MG/DL (ref 65–140)
GLUCOSE SERPL-MCNC: 315 MG/DL (ref 65–140)
HCT VFR BLD AUTO: 35.4 % (ref 34.8–46.1)
HGB BLD-MCNC: 10.7 G/DL (ref 11.5–15.4)
IMM GRANULOCYTES # BLD AUTO: 0.06 THOUSAND/UL (ref 0–0.2)
IMM GRANULOCYTES NFR BLD AUTO: 1 % (ref 0–2)
LYMPHOCYTES # BLD AUTO: 0.67 THOUSANDS/ΜL (ref 0.6–4.47)
LYMPHOCYTES NFR BLD AUTO: 6 % (ref 14–44)
MCH RBC QN AUTO: 22.3 PG (ref 26.8–34.3)
MCHC RBC AUTO-ENTMCNC: 30.2 G/DL (ref 31.4–37.4)
MCV RBC AUTO: 74 FL (ref 82–98)
MONOCYTES # BLD AUTO: 0.31 THOUSAND/ΜL (ref 0.17–1.22)
MONOCYTES NFR BLD AUTO: 3 % (ref 4–12)
NEUTROPHILS # BLD AUTO: 10.98 THOUSANDS/ΜL (ref 1.85–7.62)
NEUTS SEG NFR BLD AUTO: 90 % (ref 43–75)
NRBC BLD AUTO-RTO: 0 /100 WBCS
P AXIS: 87 DEGREES
PLATELET # BLD AUTO: 297 THOUSANDS/UL (ref 149–390)
PMV BLD AUTO: 11.7 FL (ref 8.9–12.7)
POTASSIUM SERPL-SCNC: 4.1 MMOL/L (ref 3.5–5.3)
PR INTERVAL: 184 MS
QRS AXIS: 78 DEGREES
QRSD INTERVAL: 76 MS
QT INTERVAL: 380 MS
QTC INTERVAL: 449 MS
RBC # BLD AUTO: 4.79 MILLION/UL (ref 3.81–5.12)
SODIUM SERPL-SCNC: 136 MMOL/L (ref 136–145)
T WAVE AXIS: 90 DEGREES
VENTRICULAR RATE: 84 BPM
WBC # BLD AUTO: 12.04 THOUSAND/UL (ref 4.31–10.16)

## 2020-11-13 PROCEDURE — 93010 ELECTROCARDIOGRAM REPORT: CPT | Performed by: INTERNAL MEDICINE

## 2020-11-13 PROCEDURE — 97166 OT EVAL MOD COMPLEX 45 MIN: CPT

## 2020-11-13 PROCEDURE — 99232 SBSQ HOSP IP/OBS MODERATE 35: CPT | Performed by: INTERNAL MEDICINE

## 2020-11-13 PROCEDURE — 94640 AIRWAY INHALATION TREATMENT: CPT

## 2020-11-13 PROCEDURE — 99222 1ST HOSP IP/OBS MODERATE 55: CPT | Performed by: INTERNAL MEDICINE

## 2020-11-13 PROCEDURE — 94664 DEMO&/EVAL PT USE INHALER: CPT

## 2020-11-13 PROCEDURE — 93306 TTE W/DOPPLER COMPLETE: CPT

## 2020-11-13 PROCEDURE — 93306 TTE W/DOPPLER COMPLETE: CPT | Performed by: INTERNAL MEDICINE

## 2020-11-13 PROCEDURE — 94660 CPAP INITIATION&MGMT: CPT

## 2020-11-13 PROCEDURE — 82948 REAGENT STRIP/BLOOD GLUCOSE: CPT

## 2020-11-13 PROCEDURE — 80048 BASIC METABOLIC PNL TOTAL CA: CPT | Performed by: INTERNAL MEDICINE

## 2020-11-13 PROCEDURE — 97535 SELF CARE MNGMENT TRAINING: CPT

## 2020-11-13 PROCEDURE — 94760 N-INVAS EAR/PLS OXIMETRY 1: CPT

## 2020-11-13 PROCEDURE — 85025 COMPLETE CBC W/AUTO DIFF WBC: CPT | Performed by: INTERNAL MEDICINE

## 2020-11-13 PROCEDURE — 97162 PT EVAL MOD COMPLEX 30 MIN: CPT

## 2020-11-13 RX ORDER — POLYETHYLENE GLYCOL 3350 17 G/17G
17 POWDER, FOR SOLUTION ORAL DAILY
Status: DISCONTINUED | OUTPATIENT
Start: 2020-11-13 | End: 2020-11-16 | Stop reason: HOSPADM

## 2020-11-13 RX ORDER — ACETAMINOPHEN 325 MG/1
650 TABLET ORAL EVERY 6 HOURS PRN
Status: DISCONTINUED | OUTPATIENT
Start: 2020-11-13 | End: 2020-11-16 | Stop reason: HOSPADM

## 2020-11-13 RX ORDER — INSULIN GLARGINE 100 [IU]/ML
15 INJECTION, SOLUTION SUBCUTANEOUS
Status: DISCONTINUED | OUTPATIENT
Start: 2020-11-13 | End: 2020-11-16 | Stop reason: HOSPADM

## 2020-11-13 RX ORDER — PREDNISONE 20 MG/1
40 TABLET ORAL DAILY
Status: DISCONTINUED | OUTPATIENT
Start: 2020-11-14 | End: 2020-11-16 | Stop reason: HOSPADM

## 2020-11-13 RX ORDER — NYSTATIN 100000 [USP'U]/G
POWDER TOPICAL 2 TIMES DAILY
Status: DISCONTINUED | OUTPATIENT
Start: 2020-11-13 | End: 2020-11-16 | Stop reason: HOSPADM

## 2020-11-13 RX ORDER — DOCUSATE SODIUM 100 MG/1
100 CAPSULE, LIQUID FILLED ORAL 2 TIMES DAILY
Status: DISCONTINUED | OUTPATIENT
Start: 2020-11-13 | End: 2020-11-16 | Stop reason: HOSPADM

## 2020-11-13 RX ORDER — SENNOSIDES 8.6 MG
2 TABLET ORAL
Status: DISCONTINUED | OUTPATIENT
Start: 2020-11-13 | End: 2020-11-16 | Stop reason: HOSPADM

## 2020-11-13 RX ADMIN — APIXABAN 5 MG: 5 TABLET, FILM COATED ORAL at 17:01

## 2020-11-13 RX ADMIN — VITAM B12 100 MCG: 100 TAB at 08:00

## 2020-11-13 RX ADMIN — ACETAMINOPHEN 650 MG: 325 TABLET, FILM COATED ORAL at 18:39

## 2020-11-13 RX ADMIN — INSULIN LISPRO 6 UNITS: 100 INJECTION, SOLUTION INTRAVENOUS; SUBCUTANEOUS at 07:49

## 2020-11-13 RX ADMIN — IPRATROPIUM BROMIDE 0.5 MG: 0.5 SOLUTION RESPIRATORY (INHALATION) at 07:20

## 2020-11-13 RX ADMIN — LEVALBUTEROL HYDROCHLORIDE 1.25 MG: 1.25 SOLUTION, CONCENTRATE RESPIRATORY (INHALATION) at 21:28

## 2020-11-13 RX ADMIN — FLECAINIDE ACETATE 100 MG: 100 TABLET ORAL at 20:14

## 2020-11-13 RX ADMIN — INSULIN GLARGINE 15 UNITS: 100 INJECTION, SOLUTION SUBCUTANEOUS at 22:33

## 2020-11-13 RX ADMIN — METHYLPREDNISOLONE SODIUM SUCCINATE 40 MG: 40 INJECTION, POWDER, FOR SOLUTION INTRAMUSCULAR; INTRAVENOUS at 20:17

## 2020-11-13 RX ADMIN — INSULIN LISPRO 8 UNITS: 100 INJECTION, SOLUTION INTRAVENOUS; SUBCUTANEOUS at 16:51

## 2020-11-13 RX ADMIN — POTASSIUM CHLORIDE 20 MEQ: 1500 TABLET, EXTENDED RELEASE ORAL at 17:01

## 2020-11-13 RX ADMIN — FLECAINIDE ACETATE 100 MG: 100 TABLET ORAL at 08:00

## 2020-11-13 RX ADMIN — METHYLPREDNISOLONE SODIUM SUCCINATE 40 MG: 40 INJECTION, POWDER, FOR SOLUTION INTRAMUSCULAR; INTRAVENOUS at 08:00

## 2020-11-13 RX ADMIN — LEVALBUTEROL HYDROCHLORIDE 1.25 MG: 1.25 SOLUTION, CONCENTRATE RESPIRATORY (INHALATION) at 07:20

## 2020-11-13 RX ADMIN — IPRATROPIUM BROMIDE 0.5 MG: 0.5 SOLUTION RESPIRATORY (INHALATION) at 14:45

## 2020-11-13 RX ADMIN — MAGNESIUM OXIDE TAB 400 MG (241.3 MG ELEMENTAL MG) 400 MG: 400 (241.3 MG) TAB at 08:00

## 2020-11-13 RX ADMIN — METFORMIN ER 500 MG 1000 MG: 500 TABLET ORAL at 16:52

## 2020-11-13 RX ADMIN — FUROSEMIDE 40 MG: 10 INJECTION, SOLUTION INTRAMUSCULAR; INTRAVENOUS at 16:53

## 2020-11-13 RX ADMIN — INSULIN LISPRO 6 UNITS: 100 INJECTION, SOLUTION INTRAVENOUS; SUBCUTANEOUS at 11:59

## 2020-11-13 RX ADMIN — Medication 1000 UNITS: at 08:01

## 2020-11-13 RX ADMIN — OXYCODONE HYDROCHLORIDE AND ACETAMINOPHEN 1000 MG: 500 TABLET ORAL at 08:00

## 2020-11-13 RX ADMIN — TRAZODONE HYDROCHLORIDE 50 MG: 50 TABLET ORAL at 22:29

## 2020-11-13 RX ADMIN — INSULIN LISPRO 2 UNITS: 100 INJECTION, SOLUTION INTRAVENOUS; SUBCUTANEOUS at 16:51

## 2020-11-13 RX ADMIN — METFORMIN ER 500 MG 1000 MG: 500 TABLET ORAL at 07:48

## 2020-11-13 RX ADMIN — MONTELUKAST SODIUM 10 MG: 10 TABLET, FILM COATED ORAL at 22:30

## 2020-11-13 RX ADMIN — PANTOPRAZOLE SODIUM 40 MG: 40 TABLET, DELAYED RELEASE ORAL at 05:51

## 2020-11-13 RX ADMIN — METOPROLOL SUCCINATE 50 MG: 50 TABLET, EXTENDED RELEASE ORAL at 08:01

## 2020-11-13 RX ADMIN — IPRATROPIUM BROMIDE 0.5 MG: 0.5 SOLUTION RESPIRATORY (INHALATION) at 21:28

## 2020-11-13 RX ADMIN — FLUTICASONE PROPIONATE 1 SPRAY: 50 SPRAY, METERED NASAL at 08:00

## 2020-11-13 RX ADMIN — NYSTATIN: 100000 POWDER TOPICAL at 20:15

## 2020-11-13 RX ADMIN — FLUTICASONE FUROATE AND VILANTEROL TRIFENATATE 1 PUFF: 100; 25 POWDER RESPIRATORY (INHALATION) at 08:00

## 2020-11-13 RX ADMIN — FLUTICASONE PROPIONATE 1 SPRAY: 50 SPRAY, METERED NASAL at 20:16

## 2020-11-13 RX ADMIN — LEVALBUTEROL HYDROCHLORIDE 1.25 MG: 1.25 SOLUTION, CONCENTRATE RESPIRATORY (INHALATION) at 14:45

## 2020-11-13 RX ADMIN — APIXABAN 5 MG: 5 TABLET, FILM COATED ORAL at 08:01

## 2020-11-13 RX ADMIN — LORAZEPAM 1 MG: 1 TABLET ORAL at 22:29

## 2020-11-13 RX ADMIN — STANDARDIZED SENNA CONCENTRATE 17.2 MG: 8.6 TABLET ORAL at 22:30

## 2020-11-13 RX ADMIN — CITALOPRAM HYDROBROMIDE 20 MG: 20 TABLET ORAL at 08:01

## 2020-11-13 RX ADMIN — POTASSIUM CHLORIDE 20 MEQ: 1500 TABLET, EXTENDED RELEASE ORAL at 08:00

## 2020-11-13 RX ADMIN — FUROSEMIDE 40 MG: 10 INJECTION, SOLUTION INTRAMUSCULAR; INTRAVENOUS at 07:48

## 2020-11-14 LAB
ANION GAP SERPL CALCULATED.3IONS-SCNC: 8 MMOL/L (ref 4–13)
BASOPHILS # BLD AUTO: 0.03 THOUSANDS/ΜL (ref 0–0.1)
BASOPHILS NFR BLD AUTO: 0 % (ref 0–1)
BUN SERPL-MCNC: 16 MG/DL (ref 5–25)
CALCIUM SERPL-MCNC: 8.7 MG/DL (ref 8.3–10.1)
CHLORIDE SERPL-SCNC: 95 MMOL/L (ref 100–108)
CO2 SERPL-SCNC: 32 MMOL/L (ref 21–32)
CREAT SERPL-MCNC: 0.96 MG/DL (ref 0.6–1.3)
EOSINOPHIL # BLD AUTO: 0 THOUSAND/ΜL (ref 0–0.61)
EOSINOPHIL NFR BLD AUTO: 0 % (ref 0–6)
ERYTHROCYTE [DISTWIDTH] IN BLOOD BY AUTOMATED COUNT: 19.9 % (ref 11.6–15.1)
GFR SERPL CREATININE-BSD FRML MDRD: 64 ML/MIN/1.73SQ M
GLUCOSE SERPL-MCNC: 228 MG/DL (ref 65–140)
GLUCOSE SERPL-MCNC: 262 MG/DL (ref 65–140)
GLUCOSE SERPL-MCNC: 264 MG/DL (ref 65–140)
GLUCOSE SERPL-MCNC: 280 MG/DL (ref 65–140)
GLUCOSE SERPL-MCNC: 315 MG/DL (ref 65–140)
HCT VFR BLD AUTO: 37.7 % (ref 34.8–46.1)
HGB BLD-MCNC: 11.1 G/DL (ref 11.5–15.4)
IMM GRANULOCYTES # BLD AUTO: 0.09 THOUSAND/UL (ref 0–0.2)
IMM GRANULOCYTES NFR BLD AUTO: 1 % (ref 0–2)
LYMPHOCYTES # BLD AUTO: 1.02 THOUSANDS/ΜL (ref 0.6–4.47)
LYMPHOCYTES NFR BLD AUTO: 6 % (ref 14–44)
MCH RBC QN AUTO: 21.9 PG (ref 26.8–34.3)
MCHC RBC AUTO-ENTMCNC: 29.4 G/DL (ref 31.4–37.4)
MCV RBC AUTO: 75 FL (ref 82–98)
MONOCYTES # BLD AUTO: 0.63 THOUSAND/ΜL (ref 0.17–1.22)
MONOCYTES NFR BLD AUTO: 4 % (ref 4–12)
NEUTROPHILS # BLD AUTO: 15.52 THOUSANDS/ΜL (ref 1.85–7.62)
NEUTS SEG NFR BLD AUTO: 89 % (ref 43–75)
NRBC BLD AUTO-RTO: 0 /100 WBCS
PLATELET # BLD AUTO: 377 THOUSANDS/UL (ref 149–390)
PMV BLD AUTO: 11.1 FL (ref 8.9–12.7)
POTASSIUM SERPL-SCNC: 4.1 MMOL/L (ref 3.5–5.3)
RBC # BLD AUTO: 5.06 MILLION/UL (ref 3.81–5.12)
SODIUM SERPL-SCNC: 135 MMOL/L (ref 136–145)
WBC # BLD AUTO: 17.29 THOUSAND/UL (ref 4.31–10.16)

## 2020-11-14 PROCEDURE — 99232 SBSQ HOSP IP/OBS MODERATE 35: CPT | Performed by: INTERNAL MEDICINE

## 2020-11-14 PROCEDURE — 82948 REAGENT STRIP/BLOOD GLUCOSE: CPT

## 2020-11-14 PROCEDURE — 94640 AIRWAY INHALATION TREATMENT: CPT

## 2020-11-14 PROCEDURE — 94660 CPAP INITIATION&MGMT: CPT

## 2020-11-14 PROCEDURE — 85025 COMPLETE CBC W/AUTO DIFF WBC: CPT | Performed by: INTERNAL MEDICINE

## 2020-11-14 PROCEDURE — 94664 DEMO&/EVAL PT USE INHALER: CPT

## 2020-11-14 PROCEDURE — 80048 BASIC METABOLIC PNL TOTAL CA: CPT | Performed by: INTERNAL MEDICINE

## 2020-11-14 PROCEDURE — 94760 N-INVAS EAR/PLS OXIMETRY 1: CPT

## 2020-11-14 RX ORDER — LEVALBUTEROL 1.25 MG/.5ML
1.25 SOLUTION, CONCENTRATE RESPIRATORY (INHALATION)
Status: DISCONTINUED | OUTPATIENT
Start: 2020-11-14 | End: 2020-11-16 | Stop reason: HOSPADM

## 2020-11-14 RX ADMIN — MONTELUKAST SODIUM 10 MG: 10 TABLET, FILM COATED ORAL at 21:37

## 2020-11-14 RX ADMIN — MAGNESIUM OXIDE TAB 400 MG (241.3 MG ELEMENTAL MG) 400 MG: 400 (241.3 MG) TAB at 08:26

## 2020-11-14 RX ADMIN — INSULIN LISPRO 2 UNITS: 100 INJECTION, SOLUTION INTRAVENOUS; SUBCUTANEOUS at 11:43

## 2020-11-14 RX ADMIN — LEVALBUTEROL HYDROCHLORIDE 1.25 MG: 1.25 SOLUTION, CONCENTRATE RESPIRATORY (INHALATION) at 20:55

## 2020-11-14 RX ADMIN — INSULIN LISPRO 2 UNITS: 100 INJECTION, SOLUTION INTRAVENOUS; SUBCUTANEOUS at 16:54

## 2020-11-14 RX ADMIN — DOCUSATE SODIUM 100 MG: 100 CAPSULE ORAL at 17:00

## 2020-11-14 RX ADMIN — FLUTICASONE FUROATE AND VILANTEROL TRIFENATATE 1 PUFF: 100; 25 POWDER RESPIRATORY (INHALATION) at 08:34

## 2020-11-14 RX ADMIN — NYSTATIN: 100000 POWDER TOPICAL at 17:00

## 2020-11-14 RX ADMIN — POTASSIUM CHLORIDE 20 MEQ: 1500 TABLET, EXTENDED RELEASE ORAL at 17:00

## 2020-11-14 RX ADMIN — FLECAINIDE ACETATE 100 MG: 100 TABLET ORAL at 20:14

## 2020-11-14 RX ADMIN — FLUTICASONE PROPIONATE 1 SPRAY: 50 SPRAY, METERED NASAL at 08:33

## 2020-11-14 RX ADMIN — TRAZODONE HYDROCHLORIDE 50 MG: 50 TABLET ORAL at 21:36

## 2020-11-14 RX ADMIN — FLECAINIDE ACETATE 100 MG: 100 TABLET ORAL at 08:25

## 2020-11-14 RX ADMIN — PANTOPRAZOLE SODIUM 40 MG: 40 TABLET, DELAYED RELEASE ORAL at 05:06

## 2020-11-14 RX ADMIN — METFORMIN ER 500 MG 1000 MG: 500 TABLET ORAL at 08:26

## 2020-11-14 RX ADMIN — OXYCODONE HYDROCHLORIDE AND ACETAMINOPHEN 1000 MG: 500 TABLET ORAL at 08:25

## 2020-11-14 RX ADMIN — IPRATROPIUM BROMIDE 0.5 MG: 0.5 SOLUTION RESPIRATORY (INHALATION) at 07:29

## 2020-11-14 RX ADMIN — PREDNISONE 40 MG: 20 TABLET ORAL at 08:25

## 2020-11-14 RX ADMIN — POTASSIUM CHLORIDE 20 MEQ: 1500 TABLET, EXTENDED RELEASE ORAL at 08:26

## 2020-11-14 RX ADMIN — LEVALBUTEROL HYDROCHLORIDE 1.25 MG: 1.25 SOLUTION, CONCENTRATE RESPIRATORY (INHALATION) at 07:29

## 2020-11-14 RX ADMIN — POLYETHYLENE GLYCOL 3350 17 G: 17 POWDER, FOR SOLUTION ORAL at 08:27

## 2020-11-14 RX ADMIN — Medication 1000 UNITS: at 08:26

## 2020-11-14 RX ADMIN — ACETAMINOPHEN 650 MG: 325 TABLET, FILM COATED ORAL at 20:14

## 2020-11-14 RX ADMIN — METOPROLOL SUCCINATE 50 MG: 50 TABLET, EXTENDED RELEASE ORAL at 08:26

## 2020-11-14 RX ADMIN — APIXABAN 5 MG: 5 TABLET, FILM COATED ORAL at 08:25

## 2020-11-14 RX ADMIN — DOCUSATE SODIUM 100 MG: 100 CAPSULE ORAL at 08:26

## 2020-11-14 RX ADMIN — FLUTICASONE PROPIONATE 1 SPRAY: 50 SPRAY, METERED NASAL at 20:15

## 2020-11-14 RX ADMIN — ACETAMINOPHEN 650 MG: 325 TABLET, FILM COATED ORAL at 13:50

## 2020-11-14 RX ADMIN — METFORMIN ER 500 MG 1000 MG: 500 TABLET ORAL at 16:53

## 2020-11-14 RX ADMIN — VITAM B12 100 MCG: 100 TAB at 08:26

## 2020-11-14 RX ADMIN — NYSTATIN: 100000 POWDER TOPICAL at 08:33

## 2020-11-14 RX ADMIN — INSULIN LISPRO 4 UNITS: 100 INJECTION, SOLUTION INTRAVENOUS; SUBCUTANEOUS at 16:53

## 2020-11-14 RX ADMIN — FUROSEMIDE 40 MG: 10 INJECTION, SOLUTION INTRAMUSCULAR; INTRAVENOUS at 08:25

## 2020-11-14 RX ADMIN — STANDARDIZED SENNA CONCENTRATE 17.2 MG: 8.6 TABLET ORAL at 21:36

## 2020-11-14 RX ADMIN — FUROSEMIDE 40 MG: 10 INJECTION, SOLUTION INTRAMUSCULAR; INTRAVENOUS at 16:53

## 2020-11-14 RX ADMIN — LORAZEPAM 1 MG: 1 TABLET ORAL at 21:36

## 2020-11-14 RX ADMIN — INSULIN GLARGINE 15 UNITS: 100 INJECTION, SOLUTION SUBCUTANEOUS at 21:37

## 2020-11-14 RX ADMIN — CITALOPRAM HYDROBROMIDE 20 MG: 20 TABLET ORAL at 08:25

## 2020-11-14 RX ADMIN — INSULIN LISPRO 6 UNITS: 100 INJECTION, SOLUTION INTRAVENOUS; SUBCUTANEOUS at 11:42

## 2020-11-14 RX ADMIN — INSULIN LISPRO 6 UNITS: 100 INJECTION, SOLUTION INTRAVENOUS; SUBCUTANEOUS at 08:32

## 2020-11-14 RX ADMIN — IPRATROPIUM BROMIDE 0.5 MG: 0.5 SOLUTION RESPIRATORY (INHALATION) at 20:56

## 2020-11-14 RX ADMIN — INSULIN LISPRO 2 UNITS: 100 INJECTION, SOLUTION INTRAVENOUS; SUBCUTANEOUS at 08:33

## 2020-11-14 RX ADMIN — APIXABAN 5 MG: 5 TABLET, FILM COATED ORAL at 17:00

## 2020-11-15 LAB
ANION GAP SERPL CALCULATED.3IONS-SCNC: 4 MMOL/L (ref 4–13)
BASOPHILS # BLD AUTO: 0.07 THOUSANDS/ΜL (ref 0–0.1)
BASOPHILS NFR BLD AUTO: 1 % (ref 0–1)
BUN SERPL-MCNC: 20 MG/DL (ref 5–25)
CALCIUM SERPL-MCNC: 8.4 MG/DL (ref 8.3–10.1)
CHLORIDE SERPL-SCNC: 97 MMOL/L (ref 100–108)
CO2 SERPL-SCNC: 37 MMOL/L (ref 21–32)
CREAT SERPL-MCNC: 0.7 MG/DL (ref 0.6–1.3)
EOSINOPHIL # BLD AUTO: 0.19 THOUSAND/ΜL (ref 0–0.61)
EOSINOPHIL NFR BLD AUTO: 2 % (ref 0–6)
ERYTHROCYTE [DISTWIDTH] IN BLOOD BY AUTOMATED COUNT: 19.8 % (ref 11.6–15.1)
GFR SERPL CREATININE-BSD FRML MDRD: 94 ML/MIN/1.73SQ M
GLUCOSE SERPL-MCNC: 150 MG/DL (ref 65–140)
GLUCOSE SERPL-MCNC: 171 MG/DL (ref 65–140)
GLUCOSE SERPL-MCNC: 179 MG/DL (ref 65–140)
GLUCOSE SERPL-MCNC: 212 MG/DL (ref 65–140)
GLUCOSE SERPL-MCNC: 300 MG/DL (ref 65–140)
HCT VFR BLD AUTO: 35 % (ref 34.8–46.1)
HGB BLD-MCNC: 10.3 G/DL (ref 11.5–15.4)
IMM GRANULOCYTES # BLD AUTO: 0.06 THOUSAND/UL (ref 0–0.2)
IMM GRANULOCYTES NFR BLD AUTO: 1 % (ref 0–2)
LYMPHOCYTES # BLD AUTO: 2.84 THOUSANDS/ΜL (ref 0.6–4.47)
LYMPHOCYTES NFR BLD AUTO: 23 % (ref 14–44)
MCH RBC QN AUTO: 21.9 PG (ref 26.8–34.3)
MCHC RBC AUTO-ENTMCNC: 29.4 G/DL (ref 31.4–37.4)
MCV RBC AUTO: 74 FL (ref 82–98)
MONOCYTES # BLD AUTO: 0.93 THOUSAND/ΜL (ref 0.17–1.22)
MONOCYTES NFR BLD AUTO: 8 % (ref 4–12)
NEUTROPHILS # BLD AUTO: 8.13 THOUSANDS/ΜL (ref 1.85–7.62)
NEUTS SEG NFR BLD AUTO: 65 % (ref 43–75)
NRBC BLD AUTO-RTO: 0 /100 WBCS
PLATELET # BLD AUTO: 354 THOUSANDS/UL (ref 149–390)
PMV BLD AUTO: 11.1 FL (ref 8.9–12.7)
POTASSIUM SERPL-SCNC: 3.7 MMOL/L (ref 3.5–5.3)
RBC # BLD AUTO: 4.71 MILLION/UL (ref 3.81–5.12)
SODIUM SERPL-SCNC: 138 MMOL/L (ref 136–145)
WBC # BLD AUTO: 12.22 THOUSAND/UL (ref 4.31–10.16)

## 2020-11-15 PROCEDURE — 94640 AIRWAY INHALATION TREATMENT: CPT

## 2020-11-15 PROCEDURE — 82948 REAGENT STRIP/BLOOD GLUCOSE: CPT

## 2020-11-15 PROCEDURE — 94760 N-INVAS EAR/PLS OXIMETRY 1: CPT

## 2020-11-15 PROCEDURE — 99232 SBSQ HOSP IP/OBS MODERATE 35: CPT | Performed by: INTERNAL MEDICINE

## 2020-11-15 PROCEDURE — 85025 COMPLETE CBC W/AUTO DIFF WBC: CPT | Performed by: INTERNAL MEDICINE

## 2020-11-15 PROCEDURE — 94660 CPAP INITIATION&MGMT: CPT

## 2020-11-15 PROCEDURE — 94664 DEMO&/EVAL PT USE INHALER: CPT

## 2020-11-15 PROCEDURE — 80048 BASIC METABOLIC PNL TOTAL CA: CPT | Performed by: INTERNAL MEDICINE

## 2020-11-15 RX ORDER — FUROSEMIDE 10 MG/ML
40 INJECTION INTRAMUSCULAR; INTRAVENOUS ONCE
Status: COMPLETED | OUTPATIENT
Start: 2020-11-15 | End: 2020-11-15

## 2020-11-15 RX ORDER — POTASSIUM CHLORIDE 20 MEQ/1
40 TABLET, EXTENDED RELEASE ORAL ONCE
Status: COMPLETED | OUTPATIENT
Start: 2020-11-15 | End: 2020-11-15

## 2020-11-15 RX ORDER — FUROSEMIDE 40 MG/1
40 TABLET ORAL
Status: DISCONTINUED | OUTPATIENT
Start: 2020-11-16 | End: 2020-11-16 | Stop reason: HOSPADM

## 2020-11-15 RX ADMIN — FLUTICASONE FUROATE AND VILANTEROL TRIFENATATE 1 PUFF: 100; 25 POWDER RESPIRATORY (INHALATION) at 08:11

## 2020-11-15 RX ADMIN — POTASSIUM CHLORIDE 20 MEQ: 1500 TABLET, EXTENDED RELEASE ORAL at 08:09

## 2020-11-15 RX ADMIN — INSULIN LISPRO 2 UNITS: 100 INJECTION, SOLUTION INTRAVENOUS; SUBCUTANEOUS at 08:11

## 2020-11-15 RX ADMIN — IPRATROPIUM BROMIDE 0.5 MG: 0.5 SOLUTION RESPIRATORY (INHALATION) at 19:14

## 2020-11-15 RX ADMIN — NYSTATIN: 100000 POWDER TOPICAL at 17:08

## 2020-11-15 RX ADMIN — INSULIN LISPRO 8 UNITS: 100 INJECTION, SOLUTION INTRAVENOUS; SUBCUTANEOUS at 17:07

## 2020-11-15 RX ADMIN — INSULIN LISPRO 4 UNITS: 100 INJECTION, SOLUTION INTRAVENOUS; SUBCUTANEOUS at 12:08

## 2020-11-15 RX ADMIN — LEVALBUTEROL HYDROCHLORIDE 1.25 MG: 1.25 SOLUTION, CONCENTRATE RESPIRATORY (INHALATION) at 19:14

## 2020-11-15 RX ADMIN — INSULIN LISPRO 2 UNITS: 100 INJECTION, SOLUTION INTRAVENOUS; SUBCUTANEOUS at 12:08

## 2020-11-15 RX ADMIN — TRAZODONE HYDROCHLORIDE 50 MG: 50 TABLET ORAL at 21:18

## 2020-11-15 RX ADMIN — PREDNISONE 40 MG: 20 TABLET ORAL at 08:08

## 2020-11-15 RX ADMIN — LEVALBUTEROL HYDROCHLORIDE 1.25 MG: 1.25 SOLUTION, CONCENTRATE RESPIRATORY (INHALATION) at 08:04

## 2020-11-15 RX ADMIN — INSULIN LISPRO 2 UNITS: 100 INJECTION, SOLUTION INTRAVENOUS; SUBCUTANEOUS at 08:10

## 2020-11-15 RX ADMIN — POLYETHYLENE GLYCOL 3350 17 G: 17 POWDER, FOR SOLUTION ORAL at 08:09

## 2020-11-15 RX ADMIN — Medication 1000 UNITS: at 08:09

## 2020-11-15 RX ADMIN — CITALOPRAM HYDROBROMIDE 20 MG: 20 TABLET ORAL at 08:09

## 2020-11-15 RX ADMIN — APIXABAN 5 MG: 5 TABLET, FILM COATED ORAL at 17:07

## 2020-11-15 RX ADMIN — IPRATROPIUM BROMIDE 0.5 MG: 0.5 SOLUTION RESPIRATORY (INHALATION) at 08:04

## 2020-11-15 RX ADMIN — POTASSIUM CHLORIDE 40 MEQ: 1500 TABLET, EXTENDED RELEASE ORAL at 12:10

## 2020-11-15 RX ADMIN — INSULIN LISPRO 2 UNITS: 100 INJECTION, SOLUTION INTRAVENOUS; SUBCUTANEOUS at 17:07

## 2020-11-15 RX ADMIN — APIXABAN 5 MG: 5 TABLET, FILM COATED ORAL at 08:09

## 2020-11-15 RX ADMIN — FUROSEMIDE 40 MG: 10 INJECTION, SOLUTION INTRAMUSCULAR; INTRAVENOUS at 17:08

## 2020-11-15 RX ADMIN — NYSTATIN: 100000 POWDER TOPICAL at 08:10

## 2020-11-15 RX ADMIN — MONTELUKAST SODIUM 10 MG: 10 TABLET, FILM COATED ORAL at 21:18

## 2020-11-15 RX ADMIN — FLUTICASONE PROPIONATE 1 SPRAY: 50 SPRAY, METERED NASAL at 08:11

## 2020-11-15 RX ADMIN — METOPROLOL SUCCINATE 50 MG: 50 TABLET, EXTENDED RELEASE ORAL at 08:08

## 2020-11-15 RX ADMIN — ACETAMINOPHEN 650 MG: 325 TABLET, FILM COATED ORAL at 19:34

## 2020-11-15 RX ADMIN — LORAZEPAM 1 MG: 1 TABLET ORAL at 21:18

## 2020-11-15 RX ADMIN — INSULIN GLARGINE 15 UNITS: 100 INJECTION, SOLUTION SUBCUTANEOUS at 21:19

## 2020-11-15 RX ADMIN — METFORMIN ER 500 MG 1000 MG: 500 TABLET ORAL at 17:07

## 2020-11-15 RX ADMIN — PANTOPRAZOLE SODIUM 40 MG: 40 TABLET, DELAYED RELEASE ORAL at 05:28

## 2020-11-15 RX ADMIN — METFORMIN ER 500 MG 1000 MG: 500 TABLET ORAL at 08:09

## 2020-11-15 RX ADMIN — DOCUSATE SODIUM 100 MG: 100 CAPSULE ORAL at 17:07

## 2020-11-15 RX ADMIN — FLECAINIDE ACETATE 100 MG: 100 TABLET ORAL at 20:17

## 2020-11-15 RX ADMIN — DOCUSATE SODIUM 100 MG: 100 CAPSULE ORAL at 08:09

## 2020-11-15 RX ADMIN — VITAM B12 100 MCG: 100 TAB at 08:08

## 2020-11-15 RX ADMIN — OXYCODONE HYDROCHLORIDE AND ACETAMINOPHEN 1000 MG: 500 TABLET ORAL at 08:09

## 2020-11-15 RX ADMIN — FLUTICASONE PROPIONATE 1 SPRAY: 50 SPRAY, METERED NASAL at 20:18

## 2020-11-15 RX ADMIN — POTASSIUM CHLORIDE 20 MEQ: 1500 TABLET, EXTENDED RELEASE ORAL at 17:07

## 2020-11-15 RX ADMIN — MAGNESIUM OXIDE TAB 400 MG (241.3 MG ELEMENTAL MG) 400 MG: 400 (241.3 MG) TAB at 08:09

## 2020-11-15 RX ADMIN — FLECAINIDE ACETATE 100 MG: 100 TABLET ORAL at 08:08

## 2020-11-15 RX ADMIN — FUROSEMIDE 40 MG: 10 INJECTION, SOLUTION INTRAMUSCULAR; INTRAVENOUS at 08:08

## 2020-11-16 ENCOUNTER — TRANSITIONAL CARE MANAGEMENT (OUTPATIENT)
Dept: FAMILY MEDICINE CLINIC | Facility: HOSPITAL | Age: 60
End: 2020-11-16

## 2020-11-16 VITALS
WEIGHT: 272.6 LBS | HEART RATE: 65 BPM | HEIGHT: 63 IN | TEMPERATURE: 97.9 F | DIASTOLIC BLOOD PRESSURE: 62 MMHG | OXYGEN SATURATION: 97 % | BODY MASS INDEX: 48.3 KG/M2 | SYSTOLIC BLOOD PRESSURE: 129 MMHG | RESPIRATION RATE: 20 BRPM

## 2020-11-16 PROBLEM — I27.20 PULMONARY HYPERTENSION (HCC): Status: ACTIVE | Noted: 2020-11-16

## 2020-11-16 LAB
ANION GAP SERPL CALCULATED.3IONS-SCNC: 5 MMOL/L (ref 4–13)
BASOPHILS # BLD AUTO: 0.07 THOUSANDS/ΜL (ref 0–0.1)
BASOPHILS NFR BLD AUTO: 1 % (ref 0–1)
BUN SERPL-MCNC: 18 MG/DL (ref 5–25)
CALCIUM SERPL-MCNC: 8.7 MG/DL (ref 8.3–10.1)
CHLORIDE SERPL-SCNC: 98 MMOL/L (ref 100–108)
CO2 SERPL-SCNC: 37 MMOL/L (ref 21–32)
CREAT SERPL-MCNC: 0.69 MG/DL (ref 0.6–1.3)
EOSINOPHIL # BLD AUTO: 0.26 THOUSAND/ΜL (ref 0–0.61)
EOSINOPHIL NFR BLD AUTO: 2 % (ref 0–6)
ERYTHROCYTE [DISTWIDTH] IN BLOOD BY AUTOMATED COUNT: 19.7 % (ref 11.6–15.1)
GFR SERPL CREATININE-BSD FRML MDRD: 94 ML/MIN/1.73SQ M
GLUCOSE SERPL-MCNC: 151 MG/DL (ref 65–140)
GLUCOSE SERPL-MCNC: 172 MG/DL (ref 65–140)
HCT VFR BLD AUTO: 38.9 % (ref 34.8–46.1)
HGB BLD-MCNC: 11.4 G/DL (ref 11.5–15.4)
IMM GRANULOCYTES # BLD AUTO: 0.07 THOUSAND/UL (ref 0–0.2)
IMM GRANULOCYTES NFR BLD AUTO: 1 % (ref 0–2)
LYMPHOCYTES # BLD AUTO: 3.08 THOUSANDS/ΜL (ref 0.6–4.47)
LYMPHOCYTES NFR BLD AUTO: 23 % (ref 14–44)
MCH RBC QN AUTO: 21.8 PG (ref 26.8–34.3)
MCHC RBC AUTO-ENTMCNC: 29.3 G/DL (ref 31.4–37.4)
MCV RBC AUTO: 75 FL (ref 82–98)
MONOCYTES # BLD AUTO: 1.07 THOUSAND/ΜL (ref 0.17–1.22)
MONOCYTES NFR BLD AUTO: 8 % (ref 4–12)
NEUTROPHILS # BLD AUTO: 9.02 THOUSANDS/ΜL (ref 1.85–7.62)
NEUTS SEG NFR BLD AUTO: 65 % (ref 43–75)
NRBC BLD AUTO-RTO: 0 /100 WBCS
PLATELET # BLD AUTO: 379 THOUSANDS/UL (ref 149–390)
PMV BLD AUTO: 10.8 FL (ref 8.9–12.7)
POTASSIUM SERPL-SCNC: 4.3 MMOL/L (ref 3.5–5.3)
RBC # BLD AUTO: 5.22 MILLION/UL (ref 3.81–5.12)
SODIUM SERPL-SCNC: 140 MMOL/L (ref 136–145)
WBC # BLD AUTO: 13.57 THOUSAND/UL (ref 4.31–10.16)

## 2020-11-16 PROCEDURE — 99232 SBSQ HOSP IP/OBS MODERATE 35: CPT | Performed by: INTERNAL MEDICINE

## 2020-11-16 PROCEDURE — 82948 REAGENT STRIP/BLOOD GLUCOSE: CPT

## 2020-11-16 PROCEDURE — 85025 COMPLETE CBC W/AUTO DIFF WBC: CPT | Performed by: INTERNAL MEDICINE

## 2020-11-16 PROCEDURE — 99239 HOSP IP/OBS DSCHRG MGMT >30: CPT | Performed by: INTERNAL MEDICINE

## 2020-11-16 PROCEDURE — 80048 BASIC METABOLIC PNL TOTAL CA: CPT | Performed by: INTERNAL MEDICINE

## 2020-11-16 PROCEDURE — 94760 N-INVAS EAR/PLS OXIMETRY 1: CPT

## 2020-11-16 PROCEDURE — 94640 AIRWAY INHALATION TREATMENT: CPT

## 2020-11-16 RX ORDER — DOCUSATE SODIUM 100 MG/1
100 CAPSULE, LIQUID FILLED ORAL 2 TIMES DAILY
Qty: 10 CAPSULE | Refills: 0 | Status: SHIPPED | OUTPATIENT
Start: 2020-11-16 | End: 2021-05-02

## 2020-11-16 RX ORDER — LEVALBUTEROL 1.25 MG/.5ML
1.25 SOLUTION, CONCENTRATE RESPIRATORY (INHALATION)
Qty: 60 VIAL | Refills: 0 | Status: SHIPPED | OUTPATIENT
Start: 2020-11-16

## 2020-11-16 RX ORDER — PREDNISONE 20 MG/1
TABLET ORAL
Qty: 20 TABLET | Refills: 0 | Status: SHIPPED | OUTPATIENT
Start: 2020-11-16 | End: 2020-12-04

## 2020-11-16 RX ADMIN — LEVALBUTEROL HYDROCHLORIDE 1.25 MG: 1.25 SOLUTION, CONCENTRATE RESPIRATORY (INHALATION) at 08:11

## 2020-11-16 RX ADMIN — INSULIN LISPRO 4 UNITS: 100 INJECTION, SOLUTION INTRAVENOUS; SUBCUTANEOUS at 09:00

## 2020-11-16 RX ADMIN — APIXABAN 5 MG: 5 TABLET, FILM COATED ORAL at 08:04

## 2020-11-16 RX ADMIN — INSULIN LISPRO 2 UNITS: 100 INJECTION, SOLUTION INTRAVENOUS; SUBCUTANEOUS at 09:00

## 2020-11-16 RX ADMIN — NYSTATIN: 100000 POWDER TOPICAL at 09:01

## 2020-11-16 RX ADMIN — METOPROLOL SUCCINATE 50 MG: 50 TABLET, EXTENDED RELEASE ORAL at 08:03

## 2020-11-16 RX ADMIN — MAGNESIUM OXIDE TAB 400 MG (241.3 MG ELEMENTAL MG) 400 MG: 400 (241.3 MG) TAB at 08:04

## 2020-11-16 RX ADMIN — FLECAINIDE ACETATE 100 MG: 100 TABLET ORAL at 08:03

## 2020-11-16 RX ADMIN — FUROSEMIDE 40 MG: 40 TABLET ORAL at 08:04

## 2020-11-16 RX ADMIN — FLUTICASONE FUROATE AND VILANTEROL TRIFENATATE 1 PUFF: 100; 25 POWDER RESPIRATORY (INHALATION) at 09:01

## 2020-11-16 RX ADMIN — FLUTICASONE PROPIONATE 1 SPRAY: 50 SPRAY, METERED NASAL at 09:01

## 2020-11-16 RX ADMIN — IPRATROPIUM BROMIDE 0.5 MG: 0.5 SOLUTION RESPIRATORY (INHALATION) at 08:11

## 2020-11-16 RX ADMIN — CITALOPRAM HYDROBROMIDE 20 MG: 20 TABLET ORAL at 08:04

## 2020-11-16 RX ADMIN — PANTOPRAZOLE SODIUM 40 MG: 40 TABLET, DELAYED RELEASE ORAL at 05:44

## 2020-11-16 RX ADMIN — POTASSIUM CHLORIDE 20 MEQ: 1500 TABLET, EXTENDED RELEASE ORAL at 08:03

## 2020-11-16 RX ADMIN — PREDNISONE 40 MG: 20 TABLET ORAL at 08:03

## 2020-11-16 RX ADMIN — METFORMIN ER 500 MG 1000 MG: 500 TABLET ORAL at 08:03

## 2020-11-16 RX ADMIN — OXYCODONE HYDROCHLORIDE AND ACETAMINOPHEN 1000 MG: 500 TABLET ORAL at 08:03

## 2020-11-16 RX ADMIN — VITAM B12 100 MCG: 100 TAB at 08:03

## 2020-11-16 RX ADMIN — Medication 1000 UNITS: at 08:03

## 2020-11-18 ENCOUNTER — TELEPHONE (OUTPATIENT)
Dept: FAMILY MEDICINE CLINIC | Facility: HOSPITAL | Age: 60
End: 2020-11-18

## 2020-11-20 ENCOUNTER — OFFICE VISIT (OUTPATIENT)
Dept: FAMILY MEDICINE CLINIC | Facility: HOSPITAL | Age: 60
End: 2020-11-20
Payer: COMMERCIAL

## 2020-11-20 VITALS
OXYGEN SATURATION: 93 % | SYSTOLIC BLOOD PRESSURE: 122 MMHG | BODY MASS INDEX: 49.72 KG/M2 | TEMPERATURE: 96.6 F | HEART RATE: 76 BPM | HEIGHT: 63 IN | WEIGHT: 280.6 LBS | DIASTOLIC BLOOD PRESSURE: 62 MMHG

## 2020-11-20 DIAGNOSIS — J44.1 ASTHMA WITH COPD WITH EXACERBATION (HCC): Primary | ICD-10-CM

## 2020-11-20 DIAGNOSIS — F32.1 CURRENT MODERATE EPISODE OF MAJOR DEPRESSIVE DISORDER WITHOUT PRIOR EPISODE (HCC): ICD-10-CM

## 2020-11-20 DIAGNOSIS — F17.210 CIGARETTE NICOTINE DEPENDENCE WITHOUT COMPLICATION: ICD-10-CM

## 2020-11-20 DIAGNOSIS — J96.11 CHRONIC RESPIRATORY FAILURE WITH HYPOXIA (HCC): ICD-10-CM

## 2020-11-20 DIAGNOSIS — Z79.899 ENCOUNTER FOR LONG-TERM (CURRENT) DRUG USE: ICD-10-CM

## 2020-11-20 DIAGNOSIS — J45.901 ASTHMA WITH COPD WITH EXACERBATION (HCC): Primary | ICD-10-CM

## 2020-11-20 DIAGNOSIS — I50.31 ACUTE DIASTOLIC CONGESTIVE HEART FAILURE (HCC): ICD-10-CM

## 2020-11-20 DIAGNOSIS — I10 BENIGN ESSENTIAL HYPERTENSION: ICD-10-CM

## 2020-11-20 DIAGNOSIS — J96.21 ACUTE AND CHRONIC RESPIRATORY FAILURE WITH HYPOXIA (HCC): ICD-10-CM

## 2020-11-20 PROCEDURE — 99215 OFFICE O/P EST HI 40 MIN: CPT | Performed by: FAMILY MEDICINE

## 2020-11-20 RX ORDER — LORAZEPAM 0.5 MG/1
TABLET ORAL
Qty: 90 TABLET | Refills: 0 | OUTPATIENT
Start: 2020-11-20

## 2020-11-20 RX ORDER — LORAZEPAM 0.5 MG/1
TABLET ORAL
Qty: 90 TABLET | Refills: 0 | Status: SHIPPED | OUTPATIENT
Start: 2020-11-20 | End: 2020-12-18 | Stop reason: SDUPTHER

## 2020-11-24 ENCOUNTER — OFFICE VISIT (OUTPATIENT)
Dept: CARDIOLOGY CLINIC | Facility: CLINIC | Age: 60
End: 2020-11-24
Payer: COMMERCIAL

## 2020-11-24 VITALS
HEART RATE: 67 BPM | HEIGHT: 63 IN | DIASTOLIC BLOOD PRESSURE: 60 MMHG | WEIGHT: 288 LBS | OXYGEN SATURATION: 97 % | SYSTOLIC BLOOD PRESSURE: 124 MMHG | BODY MASS INDEX: 51.03 KG/M2

## 2020-11-24 DIAGNOSIS — E11.69 DIABETES MELLITUS TYPE 2 IN OBESE (HCC): ICD-10-CM

## 2020-11-24 DIAGNOSIS — G47.33 OBSTRUCTIVE SLEEP APNEA: ICD-10-CM

## 2020-11-24 DIAGNOSIS — I50.32 CHRONIC DIASTOLIC HEART FAILURE (HCC): ICD-10-CM

## 2020-11-24 DIAGNOSIS — Z72.0 TOBACCO USE: ICD-10-CM

## 2020-11-24 DIAGNOSIS — N18.2 TYPE 2 DIABETES MELLITUS WITH STAGE 2 CHRONIC KIDNEY DISEASE, WITHOUT LONG-TERM CURRENT USE OF INSULIN (HCC): ICD-10-CM

## 2020-11-24 DIAGNOSIS — E11.22 TYPE 2 DIABETES MELLITUS WITH STAGE 2 CHRONIC KIDNEY DISEASE, WITHOUT LONG-TERM CURRENT USE OF INSULIN (HCC): ICD-10-CM

## 2020-11-24 DIAGNOSIS — E66.9 DIABETES MELLITUS TYPE 2 IN OBESE (HCC): ICD-10-CM

## 2020-11-24 DIAGNOSIS — I10 BENIGN ESSENTIAL HYPERTENSION: ICD-10-CM

## 2020-11-24 DIAGNOSIS — E66.01 MORBID OBESITY WITH BMI OF 50.0-59.9, ADULT (HCC): ICD-10-CM

## 2020-11-24 DIAGNOSIS — I48.0 PAROXYSMAL ATRIAL FIBRILLATION (HCC): ICD-10-CM

## 2020-11-24 DIAGNOSIS — Z09 HOSPITAL DISCHARGE FOLLOW-UP: Primary | ICD-10-CM

## 2020-11-24 PROCEDURE — 99215 OFFICE O/P EST HI 40 MIN: CPT | Performed by: NURSE PRACTITIONER

## 2020-11-24 PROCEDURE — 3074F SYST BP LT 130 MM HG: CPT | Performed by: NURSE PRACTITIONER

## 2020-11-24 PROCEDURE — 1036F TOBACCO NON-USER: CPT | Performed by: NURSE PRACTITIONER

## 2020-11-24 PROCEDURE — 3078F DIAST BP <80 MM HG: CPT | Performed by: NURSE PRACTITIONER

## 2020-11-24 PROCEDURE — 3008F BODY MASS INDEX DOCD: CPT | Performed by: NURSE PRACTITIONER

## 2020-11-24 PROCEDURE — 1111F DSCHRG MED/CURRENT MED MERGE: CPT | Performed by: NURSE PRACTITIONER

## 2020-11-24 PROCEDURE — 3066F NEPHROPATHY DOC TX: CPT | Performed by: NURSE PRACTITIONER

## 2020-11-24 RX ORDER — ATORVASTATIN CALCIUM 40 MG/1
40 TABLET, FILM COATED ORAL DAILY
Qty: 30 TABLET | Refills: 3 | Status: SHIPPED | OUTPATIENT
Start: 2020-11-24 | End: 2021-02-08 | Stop reason: SDUPTHER

## 2020-11-24 RX ORDER — TORSEMIDE 20 MG/1
TABLET ORAL
Qty: 120 TABLET | Refills: 1 | Status: SHIPPED | OUTPATIENT
Start: 2020-11-24 | End: 2021-02-08 | Stop reason: SDUPTHER

## 2020-12-01 ENCOUNTER — DOCUMENTATION (OUTPATIENT)
Dept: PULMONOLOGY | Facility: HOSPITAL | Age: 60
End: 2020-12-01

## 2020-12-04 ENCOUNTER — OFFICE VISIT (OUTPATIENT)
Dept: FAMILY MEDICINE CLINIC | Facility: HOSPITAL | Age: 60
End: 2020-12-04
Payer: COMMERCIAL

## 2020-12-04 VITALS
OXYGEN SATURATION: 92 % | HEIGHT: 63 IN | TEMPERATURE: 97 F | SYSTOLIC BLOOD PRESSURE: 118 MMHG | WEIGHT: 277.6 LBS | HEART RATE: 79 BPM | BODY MASS INDEX: 49.19 KG/M2 | DIASTOLIC BLOOD PRESSURE: 88 MMHG

## 2020-12-04 DIAGNOSIS — N18.2 TYPE 2 DIABETES MELLITUS WITH STAGE 2 CHRONIC KIDNEY DISEASE, WITHOUT LONG-TERM CURRENT USE OF INSULIN (HCC): ICD-10-CM

## 2020-12-04 DIAGNOSIS — E11.42 TYPE 2 DIABETES MELLITUS WITH DIABETIC POLYNEUROPATHY, WITHOUT LONG-TERM CURRENT USE OF INSULIN (HCC): ICD-10-CM

## 2020-12-04 DIAGNOSIS — F32.1 CURRENT MODERATE EPISODE OF MAJOR DEPRESSIVE DISORDER WITHOUT PRIOR EPISODE (HCC): ICD-10-CM

## 2020-12-04 DIAGNOSIS — G47.00 INSOMNIA, UNSPECIFIED TYPE: ICD-10-CM

## 2020-12-04 DIAGNOSIS — E78.00 HYPERCHOLESTEROLEMIA: ICD-10-CM

## 2020-12-04 DIAGNOSIS — I50.32 CHRONIC DIASTOLIC CONGESTIVE HEART FAILURE (HCC): ICD-10-CM

## 2020-12-04 DIAGNOSIS — J44.9 ASTHMA-COPD OVERLAP SYNDROME (HCC): Primary | ICD-10-CM

## 2020-12-04 DIAGNOSIS — J45.51 SEVERE PERSISTENT ASTHMA WITH EXACERBATION: ICD-10-CM

## 2020-12-04 DIAGNOSIS — G47.33 OBSTRUCTIVE SLEEP APNEA: ICD-10-CM

## 2020-12-04 DIAGNOSIS — I10 BENIGN ESSENTIAL HYPERTENSION: ICD-10-CM

## 2020-12-04 DIAGNOSIS — E11.22 TYPE 2 DIABETES MELLITUS WITH STAGE 2 CHRONIC KIDNEY DISEASE, WITHOUT LONG-TERM CURRENT USE OF INSULIN (HCC): ICD-10-CM

## 2020-12-04 PROCEDURE — 3066F NEPHROPATHY DOC TX: CPT | Performed by: NURSE PRACTITIONER

## 2020-12-04 PROCEDURE — 99214 OFFICE O/P EST MOD 30 MIN: CPT | Performed by: FAMILY MEDICINE

## 2020-12-04 RX ORDER — TRAZODONE HYDROCHLORIDE 100 MG/1
100 TABLET ORAL
Qty: 30 TABLET | Refills: 0 | Status: SHIPPED | OUTPATIENT
Start: 2020-12-04 | End: 2021-01-13 | Stop reason: SDUPTHER

## 2020-12-08 ENCOUNTER — OFFICE VISIT (OUTPATIENT)
Dept: CARDIOLOGY CLINIC | Facility: CLINIC | Age: 60
End: 2020-12-08
Payer: COMMERCIAL

## 2020-12-08 VITALS
HEART RATE: 88 BPM | OXYGEN SATURATION: 95 % | SYSTOLIC BLOOD PRESSURE: 110 MMHG | BODY MASS INDEX: 50.32 KG/M2 | WEIGHT: 284 LBS | HEIGHT: 63 IN | DIASTOLIC BLOOD PRESSURE: 70 MMHG

## 2020-12-08 DIAGNOSIS — I10 BENIGN ESSENTIAL HYPERTENSION: ICD-10-CM

## 2020-12-08 DIAGNOSIS — E66.9 OBESITY WITH SLEEP APNEA: ICD-10-CM

## 2020-12-08 DIAGNOSIS — G47.30 OBESITY WITH SLEEP APNEA: ICD-10-CM

## 2020-12-08 DIAGNOSIS — I48.0 PAROXYSMAL ATRIAL FIBRILLATION (HCC): ICD-10-CM

## 2020-12-08 DIAGNOSIS — G47.33 OBSTRUCTIVE SLEEP APNEA: ICD-10-CM

## 2020-12-08 DIAGNOSIS — I50.32 CHRONIC DIASTOLIC HEART FAILURE (HCC): Primary | ICD-10-CM

## 2020-12-08 PROCEDURE — 3074F SYST BP LT 130 MM HG: CPT | Performed by: NURSE PRACTITIONER

## 2020-12-08 PROCEDURE — 3078F DIAST BP <80 MM HG: CPT | Performed by: NURSE PRACTITIONER

## 2020-12-08 PROCEDURE — 3008F BODY MASS INDEX DOCD: CPT | Performed by: NURSE PRACTITIONER

## 2020-12-08 PROCEDURE — 1111F DSCHRG MED/CURRENT MED MERGE: CPT | Performed by: NURSE PRACTITIONER

## 2020-12-08 PROCEDURE — 99214 OFFICE O/P EST MOD 30 MIN: CPT | Performed by: NURSE PRACTITIONER

## 2020-12-08 PROCEDURE — 1036F TOBACCO NON-USER: CPT | Performed by: NURSE PRACTITIONER

## 2020-12-09 DIAGNOSIS — E11.65 TYPE 2 DIABETES MELLITUS WITH HYPERGLYCEMIA, WITHOUT LONG-TERM CURRENT USE OF INSULIN (HCC): ICD-10-CM

## 2020-12-09 RX ORDER — GLIMEPIRIDE 4 MG/1
TABLET ORAL
Qty: 30 TABLET | Refills: 0 | Status: SHIPPED | OUTPATIENT
Start: 2020-12-09 | End: 2021-01-13 | Stop reason: SDUPTHER

## 2020-12-14 ENCOUNTER — TELEPHONE (OUTPATIENT)
Dept: FAMILY MEDICINE CLINIC | Facility: HOSPITAL | Age: 60
End: 2020-12-14

## 2020-12-18 DIAGNOSIS — Z79.899 ENCOUNTER FOR LONG-TERM (CURRENT) DRUG USE: ICD-10-CM

## 2020-12-21 DIAGNOSIS — J45.51 SEVERE PERSISTENT ASTHMA WITH EXACERBATION: Primary | ICD-10-CM

## 2020-12-21 DIAGNOSIS — J45.901 ASTHMA WITH COPD WITH EXACERBATION (HCC): ICD-10-CM

## 2020-12-21 DIAGNOSIS — J44.1 ASTHMA WITH COPD WITH EXACERBATION (HCC): ICD-10-CM

## 2020-12-21 RX ORDER — LORAZEPAM 0.5 MG/1
TABLET ORAL
Qty: 90 TABLET | Refills: 0 | Status: SHIPPED | OUTPATIENT
Start: 2020-12-21 | End: 2021-01-13 | Stop reason: SDUPTHER

## 2021-01-08 ENCOUNTER — TELEMEDICINE (OUTPATIENT)
Dept: PULMONOLOGY | Facility: HOSPITAL | Age: 61
End: 2021-01-08
Payer: COMMERCIAL

## 2021-01-08 VITALS — WEIGHT: 288 LBS | BODY MASS INDEX: 51.03 KG/M2 | HEIGHT: 63 IN | TEMPERATURE: 96.6 F

## 2021-01-08 DIAGNOSIS — J96.11 CHRONIC RESPIRATORY FAILURE WITH HYPOXIA AND HYPERCAPNIA (HCC): ICD-10-CM

## 2021-01-08 DIAGNOSIS — J96.12 CHRONIC RESPIRATORY FAILURE WITH HYPOXIA AND HYPERCAPNIA (HCC): ICD-10-CM

## 2021-01-08 DIAGNOSIS — J44.9 ASTHMA-COPD OVERLAP SYNDROME (HCC): Primary | ICD-10-CM

## 2021-01-08 DIAGNOSIS — G47.33 OBSTRUCTIVE SLEEP APNEA: ICD-10-CM

## 2021-01-08 PROCEDURE — 3008F BODY MASS INDEX DOCD: CPT | Performed by: INTERNAL MEDICINE

## 2021-01-08 PROCEDURE — 1036F TOBACCO NON-USER: CPT | Performed by: INTERNAL MEDICINE

## 2021-01-08 PROCEDURE — 99213 OFFICE O/P EST LOW 20 MIN: CPT | Performed by: INTERNAL MEDICINE

## 2021-01-08 NOTE — ASSESSMENT & PLAN NOTE
Patient has failed multiple inhalation regimens in the past for the last few weeks she has been on Laba/Lama combination Bevespi and she still has no improvement or her symptoms at I reviewed her inhalation technique which looked very fairly good at this point I would like to switch her to triple inhalation therapy Trelegy Laba/Lama/ics  Her insurance did not approve her triple inhalation therapy before and required to have a trial on Laba/Lama  Inhalation, which we have been trying for the last few weeks with no symptom improvement

## 2021-01-08 NOTE — PATIENT INSTRUCTIONS
Switching long acting inhalation therapy to Trelegy from [employer] pending insurance approval  Follow-up in 3 months

## 2021-01-08 NOTE — PROGRESS NOTES
Virtual Regular Visit      Assessment/Plan:    Problem List Items Addressed This Visit        Respiratory    Obstructive sleep apnea     She has an auto PAP 12-20 cm H2O with C flex 3 she is compliant she uses it every night and she feels more refreshed with encouraged her to continue using         Asthma-COPD overlap syndrome ([facility] ) - Primary     Patient has failed multiple inhalation regimens in the past for the last few weeks she has been on Laba/Lama combination Bevespi and she still has no improvement or her symptoms at I reviewed her inhalation technique which looked very fairly good at this point I would like to switch her to triple inhalation therapy Trelegy Laba/Lama/ics  Her insurance did not approve her triple inhalation therapy before and required to have a trial on Laba/Lama  Inhalation, which we have been trying for the last few weeks with no symptom improvement         Relevant Medications    fluticasone-umeclidinium-vilanterol (TRELEGY) 100-62 5-25 MCG/INH inhaler    Chronic respiratory failure with hypoxia and hypercapnia ([facility] [address] )     Patient is adherent using her oxygen                    Reason for visit is   Chief Complaint   Patient presents with    Virtual Regular Visit        Encounter provider Marvin Millan MD    Provider located at [place] [address] [place] [id number]      Recent Visits  No visits were found meeting these conditions  Showing recent visits within past 7 days and meeting all other requirements     Today's Visits  Date Type Provider Dept   01/08/21 Telemedicine Cathryn Burris MD [facility]   Showing today's visits and meeting all other requirements     Future Appointments  No visits were found meeting these conditions  Showing future appointments within next 150 days and meeting all other requirements        The patient was identified by name and date of birth   Gold Glez Akua Barnard was informed that this is a telemedicine visit and that the visit is being conducted through [facility] and patient was informed that this is a secure, HIPAA-compliant platform  She agrees to proceed     My office door was closed  No one else was in the room  She acknowledged consent and understanding of privacy and security of the video platform  The patient has agreed to participate and understands they can discontinue the visit at any time  Patient is aware this is a billable service  Subjective  Pablo Cordon is a 56 y o  female with past medical history as detailed below    of chronic hypoxemic respiratory failure secondary to asthma/COPD who overlap syndrome and chronic nicotine dependence and chronic congestive heart failure on long-term oxygen therapy visual visit today for follow-up on her chronic dyspnea on exertion, the patient has been on multiple inhalation resume it in the past with no symptom improvement she has been using Laba/Lama combination Bevespi for the last few weeks since her insurance declined Trelegy treatment with no symptoms improvement she still remains with very shortness of breath with little exertion even walking to her garage would make her very short of breath she does not have currently any cough very occasionally she will have some mucus production and she denies hemoptysis she sleeps soundly on CPAP therapy 12-20 cm H2O auto Pap every night and she feels more refreshed with using it           Past Medical History:   Diagnosis Date    Alcohol abuse 5/21/2020    Alcohol abuse 5/21/2020    Anemia     Atrial fibrillation (HCC)     Cervical radiculopathy     CHF (congestive heart failure) (HCC)     Chronic low back pain     [facility] 2/20/2018    Community acquired pneumonia     Community acquired pneumonia 5/21/2020    Diabetes mellitus ([facility] )     Diabetes mellitus with hyperglycemia (HCC)     Elevated liver enzymes     GERD (gastroesophageal reflux disease)     Hypersomnolence 10/28/2020    Hypokalemia 2018    Paresthesia of upper extremity     Plantar fasciitis     Restless leg     Sexual dysfunction     Sleep apnea, obstructive     Tenosynovitis of finger     Tinea corporis     Weakness of upper extremity        Past Surgical History:   Procedure Laterality Date    ABDOMINAL SURGERY      CARPAL TUNNEL RELEASE Bilateral      SECTION      CHOLECYSTECTOMY      laparoscopic    ESOPHAGOGASTRODUODENOSCOPY N/A 12/3/2018    Procedure: ESOPHAGOGASTRODUODENOSCOPY (EGD) AND COLONOSCOPY;  Surgeon: Chandrika Ribera MD;  Location: [facility];  Service: Gastroenterology    GASTRIC BYPASS      for morbid obesity with gilda en y    HERNIA REPAIR      HYSTERECTOMY      ME EXCIS PRIMARY GANGLION WRIST Left 2017    Procedure: LONG FINGER GANGLION CYST EXCISION;  Surgeon: Devon Macias MD;  [facility]: [facility];  Service: Orthopedics    ME INCISE FINGER TENDON SHEATH Left 2017    Procedure: THUMB, LONG, RING, TRIGGER FINGER RELEASE;  Surgeon: Devon Macias MD;  [facility];  Service: Orthopedics    SHOULDER SURGERY         Current Outpatient Medications   Medication Sig Dispense Refill    albuterol (PROVENTIL HFA,VENTOLIN HFA) 90 mcg/act inhaler Inhale 2 puffs every 4 (four) hours as needed for wheezing 1 Inhaler 4    apixaban (Eliquis) 5 mg Take 1 tablet (5 mg total) by mouth 2 (two) times a day 60 tablet 5    ascorbic acid (VITAMIN C) 1000 MG tablet Take 1,000 mg by mouth daily        atorvastatin (LIPITOR) 40 mg tablet Take 1 tablet (40 mg total) by mouth daily 30 tablet 3    Blood Glucose Monitoring Suppl (TripConnect CONTOUR NEXT MONITOR) w/Device KIT by Does not apply route daily      Cholecalciferol 1000 units tablet Take 1,000 Units by mouth daily        citalopram (CeleXA) 20 mg tablet Take 1 tablet (20 mg total) by mouth daily 30 tablet 1    clotrimazole (LOTRIMIN) 1 % cream Apply topically 2 (two) times a day 30 g 0    cyanocobalamin (VITAMIN B-12) 100 mcg tablet Take by mouth daily      docusate sodium (COLACE) 100 mg capsule Take 1 capsule (100 mg total) by mouth 2 (two) times a day 10 capsule 0    flecainide (TAMBOCOR) 100 mg tablet Take 1 tablet (100 mg total) by mouth every 12 (twelve) hours 180 tablet 1    fluticasone (FLONASE) 50 mcg/act nasal spray 1 spray into each nostril 2 (two) times a day 1 Bottle 3    glimepiride (AMARYL) 4 mg tablet Take 1 tablet by mouth once daily with breakfast 30 tablet 0    glucose blood (CONTOUR NEXT TEST) test strip Test blood sugar 3 times a day 100 each 3    levalbuterol (XOPENEX) 1 25 mg/0 5 mL nebulizer solution Take 0 5 mL (1 25 mg total) by nebulization 2 (two) times a day 60 vial 0    EduvantCAN UNISTIK II LANCETS MISC by Does not apply route 3 (three) times a day 100 each 5    loratadine (Claritin) 10 mg tablet Take by mouth      LORazepam (ATIVAN) 0 5 mg tablet TAKE 2 TABLETS BY MOUTH AT BEDTIME AND 1 EVERY 6 HOURS AS NEEDED FOR ANXIETY 90 tablet 0    Magnesium 400 MG CAPS Take by mouth 1 BID      metFORMIN (GLUCOPHAGE-XR) 500 mg 24 hr tablet Take 2 tablets (1,000 mg total) by mouth 2 (two) times a day with meals 120 tablet 5    metoprolol succinate (TOPROL-XL) 50 mg 24 hr tablet Take 1 tablet (50 mg total) by mouth daily 90 tablet 1    montelukast (SINGULAIR) 10 mg tablet Take 1 tablet (10 mg total) by mouth daily at bedtime 90 tablet 3    naloxone (NARCAN) 4 mg/0 1 mL nasal spray Administer 1 spray into a nostril  If breathing does not return to normal or if breathing difficulty resumes after 2-3 minutes, give another dose in the other nostril using a new spray   1 each 1    omeprazole (PriLOSEC) 40 MG capsule Take 1 capsule (40 mg total) by mouth daily 90 capsule 3    potassium chloride (K-DUR,KLOR-CON) 20 mEq tablet Take 1 tablet (20 mEq total) by mouth 2 (two) times a day 180 tablet 1    torsemide (DEMADEX) 20 mg tablet Two tabs b i d  120 tablet 1    traZODone (DESYREL) 100 mg tablet Take 1 tablet (100 mg total) by mouth daily at bedtime 30 tablet 0    ferrous sulfate 324 (65 Fe) mg Take 1 tablet (324 mg total) by mouth daily before breakfast 30 tablet 0    fluticasone-umeclidinium-vilanterol (TRELEGY) 100-62 5-25 MCG/INH inhaler Inhale 1 puff daily Rinse mouth after use  1 Inhaler 3     No current facility-administered medications for this visit  Allergies   Allergen Reactions    Iron Dextran Swelling    Januvia [Sitagliptin] Shortness Of Breath    Jardiance [Empagliflozin] Shortness Of Breath    Clonazepam Other (See Comments)     Unknown reaction    Codeine Itching and Other (See Comments)     itch;mary kay morphine,takes ultram @home    Adhesive [Medical Tape]      itching    Effexor [Venlafaxine] Itching    Lactose     Oxycodone-Acetaminophen      Other reaction(s): Other (See Comments)  (PERCOCET) MILD RASH, not allergic to Acetaminophen     Oxycodone-Acetaminophen Anxiety       Review of Systems   Constitutional: Negative for chills, diaphoresis, fatigue and fever  HENT: Negative for congestion, postnasal drip, rhinorrhea, sinus pressure and sore throat  Eyes: Negative for redness and visual disturbance  Respiratory: Positive for cough, chest tightness, shortness of breath and wheezing  Negative for stridor  Cardiovascular: Negative for chest pain and leg swelling  Gastrointestinal: Negative for abdominal distention, abdominal pain, diarrhea and vomiting  Endocrine: Negative for polydipsia and polyphagia  Genitourinary: Negative for dysuria and hematuria  Musculoskeletal: Negative for back pain and myalgias  Skin: Negative for pallor and rash  Neurological: Negative for dizziness, weakness and headaches  Psychiatric/Behavioral: Negative for sleep disturbance  The patient is not nervous/anxious          Video Exam    Vitals:    01/08/21 0901   Temp: (!) 96 6 °F (35 9 °C)   Weight: 131 kg (288 lb)   Height: 5' 3" (1 6 m)       Physical Exam  Constitutional:       General: She is not in acute distress  Appearance: Normal appearance  She is obese  She is not ill-appearing, toxic-appearing or diaphoretic  HENT:      Head: Normocephalic and atraumatic  Nose: Nose normal  No rhinorrhea  Mouth/Throat:      Mouth: Mucous membranes are moist       Pharynx: Oropharynx is clear  No oropharyngeal exudate or posterior oropharyngeal erythema  Eyes:      General: No scleral icterus  Right eye: No discharge  Left eye: No discharge  Conjunctiva/sclera: Conjunctivae normal    Neck:      Musculoskeletal: Normal range of motion and neck supple  Pulmonary:      Effort: Pulmonary effort is normal  No respiratory distress  Musculoskeletal:         General: No swelling  Skin:     Coloration: Skin is not jaundiced or pale  Findings: No bruising, erythema, lesion or rash  Neurological:      General: No focal deficit present  Mental Status: She is alert and oriented to person, place, and time  Mental status is at baseline  Psychiatric:         Mood and Affect: Mood normal          Behavior: Behavior normal          Thought Content: Thought content normal          Judgment: Judgment normal           I spent 15 minutes directly with the patient during this visit      [facility] acknowledges that she has consented to an online visit or consultation  She understands that the online visit is based solely on information provided by her, and that, in the absence of a face-to-face physical evaluation by the physician, the diagnosis she receives is both limited and provisional in terms of accuracy and completeness  This is not intended to replace a full medical face-to-face evaluation by the physician  Jene Bloch understands and accepts these terms

## 2021-01-08 NOTE — ASSESSMENT & PLAN NOTE
She has an auto PAP 12-20 cm H2O with C flex 3 she is compliant she uses it every night and she feels more refreshed with encouraged her to continue using

## 2021-01-11 ENCOUNTER — APPOINTMENT (EMERGENCY)
Dept: RADIOLOGY | Facility: HOSPITAL | Age: 61
End: 2021-01-11
Payer: COMMERCIAL

## 2021-01-11 ENCOUNTER — HOSPITAL ENCOUNTER (EMERGENCY)
Facility: HOSPITAL | Age: 61
Discharge: HOME | End: 2021-01-11
Attending: EMERGENCY MEDICINE | Admitting: EMERGENCY MEDICINE
Payer: COMMERCIAL

## 2021-01-11 VITALS
DIASTOLIC BLOOD PRESSURE: 58 MMHG | OXYGEN SATURATION: 95 % | SYSTOLIC BLOOD PRESSURE: 114 MMHG | TEMPERATURE: 98.1 F | RESPIRATION RATE: 18 BRPM | HEART RATE: 76 BPM

## 2021-01-11 DIAGNOSIS — R06.02 SOB (SHORTNESS OF BREATH): Primary | ICD-10-CM

## 2021-01-11 LAB
ALBUMIN SERPL BCP-MCNC: 3.4 G/DL (ref 3.5–5)
ALP SERPL-CCNC: 193 U/L (ref 46–116)
ALT SERPL W P-5'-P-CCNC: 148 U/L (ref 12–78)
ANION GAP SERPL CALCULATED.3IONS-SCNC: 4 MMOL/L (ref 4–13)
AST SERPL W P-5'-P-CCNC: 107 U/L (ref 5–45)
BASOPHILS # BLD AUTO: 0.08 THOUSANDS/ΜL (ref 0–0.1)
BASOPHILS NFR BLD AUTO: 1 % (ref 0–1)
BILIRUB SERPL-MCNC: 0.4 MG/DL (ref 0.2–1)
BUN SERPL-MCNC: 10 MG/DL (ref 5–25)
CALCIUM ALBUM COR SERPL-MCNC: 9.6 MG/DL (ref 8.3–10.1)
CALCIUM SERPL-MCNC: 9.1 MG/DL (ref 8.3–10.1)
CHLORIDE SERPL-SCNC: 97 MMOL/L (ref 100–108)
CO2 SERPL-SCNC: 32 MMOL/L (ref 21–32)
CREAT SERPL-MCNC: 0.76 MG/DL (ref 0.6–1.3)
EOSINOPHIL # BLD AUTO: 0.27 THOUSAND/ΜL (ref 0–0.61)
EOSINOPHIL NFR BLD AUTO: 2 % (ref 0–6)
ERYTHROCYTE [DISTWIDTH] IN BLOOD BY AUTOMATED COUNT: 16.6 % (ref 11.6–15.1)
FLUAV RNA RESP QL NAA+PROBE: NEGATIVE
FLUBV RNA RESP QL NAA+PROBE: NEGATIVE
GFR SERPL CREATININE-BSD FRML MDRD: 84 ML/MIN/1.73SQ M
GLUCOSE SERPL-MCNC: 307 MG/DL (ref 65–140)
HCT VFR BLD AUTO: 36.3 % (ref 34.8–46.1)
HGB BLD-MCNC: 10.6 G/DL (ref 11.5–15.4)
IMM GRANULOCYTES # BLD AUTO: 0.14 THOUSAND/UL (ref 0–0.2)
IMM GRANULOCYTES NFR BLD AUTO: 1 % (ref 0–2)
LYMPHOCYTES # BLD AUTO: 1.28 THOUSANDS/ΜL (ref 0.6–4.47)
LYMPHOCYTES NFR BLD AUTO: 10 % (ref 14–44)
MCH RBC QN AUTO: 22.5 PG (ref 26.8–34.3)
MCHC RBC AUTO-ENTMCNC: 29.2 G/DL (ref 31.4–37.4)
MCV RBC AUTO: 77 FL (ref 82–98)
MONOCYTES # BLD AUTO: 0.73 THOUSAND/ΜL (ref 0.17–1.22)
MONOCYTES NFR BLD AUTO: 6 % (ref 4–12)
NEUTROPHILS # BLD AUTO: 10.27 THOUSANDS/ΜL (ref 1.85–7.62)
NEUTS SEG NFR BLD AUTO: 80 % (ref 43–75)
NRBC BLD AUTO-RTO: 0 /100 WBCS
NT-PROBNP SERPL-MCNC: 337 PG/ML
PLATELET # BLD AUTO: 306 THOUSANDS/UL (ref 149–390)
PMV BLD AUTO: 11.1 FL (ref 8.9–12.7)
POTASSIUM SERPL-SCNC: 4.2 MMOL/L (ref 3.5–5.3)
PROT SERPL-MCNC: 6.9 G/DL (ref 6.4–8.2)
RBC # BLD AUTO: 4.72 MILLION/UL (ref 3.81–5.12)
RSV RNA RESP QL NAA+PROBE: NEGATIVE
SARS-COV-2 RNA RESP QL NAA+PROBE: NEGATIVE
SODIUM SERPL-SCNC: 133 MMOL/L (ref 136–145)
TROPONIN I SERPL-MCNC: <0.02 NG/ML
WBC # BLD AUTO: 12.77 THOUSAND/UL (ref 4.31–10.16)

## 2021-01-11 PROCEDURE — 94640 AIRWAY INHALATION TREATMENT: CPT

## 2021-01-11 PROCEDURE — 71045 X-RAY EXAM CHEST 1 VIEW: CPT

## 2021-01-11 PROCEDURE — 83880 ASSAY OF NATRIURETIC PEPTIDE: CPT | Performed by: EMERGENCY MEDICINE

## 2021-01-11 PROCEDURE — 80053 COMPREHEN METABOLIC PANEL: CPT | Performed by: EMERGENCY MEDICINE

## 2021-01-11 PROCEDURE — 99285 EMERGENCY DEPT VISIT HI MDM: CPT | Performed by: EMERGENCY MEDICINE

## 2021-01-11 PROCEDURE — 85025 COMPLETE CBC W/AUTO DIFF WBC: CPT | Performed by: EMERGENCY MEDICINE

## 2021-01-11 PROCEDURE — 99285 EMERGENCY DEPT VISIT HI MDM: CPT

## 2021-01-11 PROCEDURE — 93005 ELECTROCARDIOGRAM TRACING: CPT

## 2021-01-11 PROCEDURE — 36415 COLL VENOUS BLD VENIPUNCTURE: CPT | Performed by: EMERGENCY MEDICINE

## 2021-01-11 PROCEDURE — 84484 ASSAY OF TROPONIN QUANT: CPT | Performed by: EMERGENCY MEDICINE

## 2021-01-11 PROCEDURE — 0241U HB NFCT DS VIR RESP RNA 4 TRGT: CPT | Performed by: EMERGENCY MEDICINE

## 2021-01-11 RX ORDER — IPRATROPIUM BROMIDE AND ALBUTEROL SULFATE 2.5; .5 MG/3ML; MG/3ML
3 SOLUTION RESPIRATORY (INHALATION) ONCE
Status: COMPLETED | OUTPATIENT
Start: 2021-01-11 | End: 2021-01-11

## 2021-01-11 RX ADMIN — IPRATROPIUM BROMIDE AND ALBUTEROL SULFATE 3 ML: 2.5; .5 SOLUTION RESPIRATORY (INHALATION) at 21:47

## 2021-01-12 ENCOUNTER — TELEPHONE (OUTPATIENT)
Dept: PULMONOLOGY | Facility: HOSPITAL | Age: 61
End: 2021-01-12

## 2021-01-12 LAB
ATRIAL RATE: 77 BPM
P AXIS: 86 DEGREES
PR INTERVAL: 182 MS
QRS AXIS: 73 DEGREES
QRSD INTERVAL: 78 MS
QT INTERVAL: 384 MS
QTC INTERVAL: 434 MS
T WAVE AXIS: 78 DEGREES
VENTRICULAR RATE: 77 BPM

## 2021-01-12 PROCEDURE — 93010 ELECTROCARDIOGRAM REPORT: CPT | Performed by: INTERNAL MEDICINE

## 2021-01-12 NOTE — ED PROVIDER NOTES
History  Chief Complaint   Patient presents with    Shortness of Breath     pt states her breathing has gotten worse in the past 2 weeks, and came in because she couldn't handle it anymore     27-year-old female with a history of congestive heart failure presents for evaluation progressive shortness of breath with associated cough  The patient states her symptoms have gotten worse the past 2 weeks  She has noted that she has had a 10 lb weight gain in less than a week  The patient is now dyspneic at rest   Prior to coming to the emergency department, she took two 40mg Lasix tablets without improvement  The patient states the symptoms are similar to previous exacerbations of her CHF  She has noted lower extremity swelling  Prior to Admission Medications   Prescriptions Last Dose Informant Patient Reported? Taking?    Blood Glucose Monitoring Suppl (BucketFeet CONTOUR NEXT MONITOR) w/Device KIT  Self Yes No   Sig: by Does not apply route daily   Cholecalciferol 1000 units tablet  Self Yes No   Sig: Take 1,000 Units by mouth daily     LIFESCAN UNISTIK II LANCETS MISC  Self No No   Sig: by Does not apply route 3 (three) times a day   LORazepam (ATIVAN) 0 5 mg tablet  Self No No   Sig: TAKE 2 TABLETS BY MOUTH AT BEDTIME AND 1 EVERY 6 HOURS AS NEEDED FOR ANXIETY   Magnesium 400 MG CAPS  Self Yes No   Sig: Take by mouth 1 BID   albuterol (PROVENTIL HFA,VENTOLIN HFA) 90 mcg/act inhaler  Self No No   Sig: Inhale 2 puffs every 4 (four) hours as needed for wheezing   apixaban (Eliquis) 5 mg  Self No No   Sig: Take 1 tablet (5 mg total) by mouth 2 (two) times a day   ascorbic acid (VITAMIN C) 1000 MG tablet  Self Yes No   Sig: Take 1,000 mg by mouth daily     atorvastatin (LIPITOR) 40 mg tablet  Self No No   Sig: Take 1 tablet (40 mg total) by mouth daily   citalopram (CeleXA) 20 mg tablet  Self No No   Sig: Take 1 tablet (20 mg total) by mouth daily   clotrimazole (LOTRIMIN) 1 % cream  Self No No   Sig: Apply topically 2 (two) times a day   cyanocobalamin (VITAMIN B-12) 100 mcg tablet  Self Yes No   Sig: Take by mouth daily   docusate sodium (COLACE) 100 mg capsule  Self No No   Sig: Take 1 capsule (100 mg total) by mouth 2 (two) times a day   ferrous sulfate 324 (65 Fe) mg   No No   Sig: Take 1 tablet (324 mg total) by mouth daily before breakfast   flecainide (TAMBOCOR) 100 mg tablet  Self No No   Sig: Take 1 tablet (100 mg total) by mouth every 12 (twelve) hours   fluticasone (FLONASE) 50 mcg/act nasal spray  Self No No   Si spray into each nostril 2 (two) times a day   fluticasone-umeclidinium-vilanterol (TRELEGY) 100-62 5-25 MCG/INH inhaler   No No   Sig: Inhale 1 puff daily Rinse mouth after use  glimepiride (AMARYL) 4 mg tablet  Self No No   Sig: Take 1 tablet by mouth once daily with breakfast   glucose blood (CONTOUR NEXT TEST) test strip  Self No No   Sig: Test blood sugar 3 times a day   levalbuterol (XOPENEX) 1 25 mg/0 5 mL nebulizer solution  Self No No   Sig: Take 0 5 mL (1 25 mg total) by nebulization 2 (two) times a day   loratadine (Claritin) 10 mg tablet  Self Yes No   Sig: Take by mouth   metFORMIN (GLUCOPHAGE-XR) 500 mg 24 hr tablet  Self No No   Sig: Take 2 tablets (1,000 mg total) by mouth 2 (two) times a day with meals   metoprolol succinate (TOPROL-XL) 50 mg 24 hr tablet  Self No No   Sig: Take 1 tablet (50 mg total) by mouth daily   montelukast (SINGULAIR) 10 mg tablet  Self No No   Sig: Take 1 tablet (10 mg total) by mouth daily at bedtime   naloxone (NARCAN) 4 mg/0 1 mL nasal spray  Self No No   Sig: Administer 1 spray into a nostril  If breathing does not return to normal or if breathing difficulty resumes after 2-3 minutes, give another dose in the other nostril using a new spray     omeprazole (PriLOSEC) 40 MG capsule  Self No No   Sig: Take 1 capsule (40 mg total) by mouth daily   potassium chloride (K-DUR,KLOR-CON) 20 mEq tablet  Self No No   Sig: Take 1 tablet (20 mEq total) by mouth 2 (two) times a day   torsemide (DEMADEX) 20 mg tablet  Self No No   Sig: Two tabs b i d    traZODone (DESYREL) 100 mg tablet  Self No No   Sig: Take 1 tablet (100 mg total) by mouth daily at bedtime      Facility-Administered Medications: None       Past Medical History:   Diagnosis Date    Alcohol abuse 2020    Alcohol abuse 2020    Anemia     Atrial fibrillation (HCC)     Cervical radiculopathy     [facility]     Chronic low back pain     Chronic obstructive asthma ([facility] ) 2018    Community acquired pneumonia     Community acquired pneumonia 2020    Diabetes mellitus ([facility] )     Diabetes mellitus with hyperglycemia ([facility] )     Elevated liver enzymes     GERD (gastroesophageal reflux disease)     Hypersomnolence 10/28/2020    Hypokalemia 2018    Paresthesia of upper extremity     Plantar fasciitis     Restless leg     Sexual dysfunction     Sleep apnea, obstructive     Tenosynovitis of finger     Tinea corporis     Weakness of upper extremity        Past Surgical History:   Procedure Laterality Date    ABDOMINAL SURGERY      CARPAL TUNNEL RELEASE Bilateral      SECTION      CHOLECYSTECTOMY      laparoscopic    ESOPHAGOGASTRODUODENOSCOPY N/A 12/3/2018    Procedure: ESOPHAGOGASTRODUODENOSCOPY (EGD) AND COLONOSCOPY;  Surgeon: Jef Garcia MD;  [facility];  Service: Gastroenterology    GASTRIC BYPASS      for morbid obesity with gilda en y    HERNIA REPAIR      HYSTERECTOMY      ME EXCIS PRIMARY GANGLION WRIST Left 2017    Procedure: LONG FINGER GANGLION CYST EXCISION;  Surgeon: Domingo Hoff MD;  [facility];  Service: Orthopedics    ME INCISE FINGER TENDON SHEATH Left 2017    Procedure: Thressa Stade, RING, TRIGGER FINGER RELEASE;  Surgeon: Domingo Hoff MD;  [facility];  Service: Orthopedics    SHOULDER SURGERY         Family History   Problem Relation Age of Onset    Alzheimer's disease Mother     Atrial fibrillation Mother     Dementia Mother     Heart disease Mother         heart problem    Seizures Mother     Parkinsonism Father     Arthritis Father     Atrial fibrillation Father     Substance Abuse Neg Hx         mother,father    Mental illness Neg Hx         mother,father     I have reviewed and agree with the history as documented  E-Cigarette/Vaping    E-Cigarette Use Never User      E-Cigarette/Vaping Substances    Nicotine No     THC No     CBD No     Flavoring No     Other No     Unknown No      Social History     Tobacco Use    Smoking status: Former Smoker     Packs/day:      Years:      Pack years:      Types: Cigarettes     Start date: 200     Quit date: 12/10/2019     Years since quittin 0    Smokeless tobacco: Never Used   Substance Use Topics    Alcohol use: Not Currently     Alcohol/week: 20 0 standard drinks     Types: 20 Cans of beer per week     Frequency: 4 or more times a week     Drinks per session: 3 or 4     Binge frequency: Never     Comment: about 6 coors light every night, stopped 3 weeks ago     Drug use: No       Review of Systems   Constitutional: Positive for fatigue  Negative for chills and fever  HENT: Negative for sore throat  Eyes: Negative for visual disturbance  Respiratory: Positive for cough and shortness of breath  Cardiovascular: Positive for leg swelling  Negative for chest pain  Gastrointestinal: Negative for abdominal pain, diarrhea, nausea and vomiting  Genitourinary: Negative for difficulty urinating, dysuria and pelvic pain  Musculoskeletal: Negative for back pain  Skin: Negative for rash  Neurological: Negative for syncope, weakness and headaches  All other systems reviewed and are negative  Physical Exam  Physical Exam  Vitals signs and nursing note reviewed  Constitutional:       General: She is not in acute distress  Appearance: She is well-developed     HENT: Head: Normocephalic and atraumatic  Right Ear: External ear normal       Left Ear: External ear normal    Eyes:      General: No scleral icterus  Conjunctiva/sclera: Conjunctivae normal       Pupils: Pupils are equal, round, and reactive to light  Neck:      Musculoskeletal: Normal range of motion  Cardiovascular:      Rate and Rhythm: Normal rate and regular rhythm  Heart sounds: Normal heart sounds  Pulmonary:      Effort: Pulmonary effort is normal  No respiratory distress  Breath sounds: Examination of the right-lower field reveals decreased breath sounds and rales  Examination of the left-lower field reveals decreased breath sounds and rales  Decreased breath sounds and rales present  Abdominal:      General: Bowel sounds are normal       Palpations: Abdomen is soft  Tenderness: There is no abdominal tenderness  There is no guarding or rebound  Musculoskeletal: Normal range of motion  Right lower leg: Edema ( Trace) present  Left lower leg: Edema ( Trace) present  Skin:     General: Skin is warm and dry  Findings: No rash  Neurological:      Mental Status: She is alert and oriented to person, place, and time           Vital Signs  ED Triage Vitals   Temperature Pulse Respirations Blood Pressure SpO2   01/11/21 2042 01/11/21 2030 01/11/21 2030 01/11/21 2030 01/11/21 2030   98 1 °F (36 7 °C) 83 18 133/63 93 %      Temp Source Heart Rate Source Patient Position - Orthostatic VS BP Location FiO2 (%)   01/11/21 2042 01/11/21 2030 01/11/21 2030 01/11/21 2030 --   Temporal Monitor Sitting Right arm       Pain Score       --                  Vitals:    01/11/21 2200 01/11/21 2230 01/11/21 2255 01/11/21 2308   BP: 114/70 113/56  114/58   Pulse: 74 75 75 76   Patient Position - Orthostatic VS: Lying Lying  Lying         Visual Acuity      ED Medications  Medications   ipratropium-albuterol (DUO-NEB) 0 5-2 5 mg/3 mL inhalation solution 3 mL (3 mL Nebulization Given 1/11/21 2147)       Diagnostic Studies  Results Reviewed     Procedure Component Value Units Date/Time    COVID19, Influenza A/B, RSV PCR, [facility] [[id number]]  (Normal) Collected: 01/11/21 2045    Lab Status: Final result Specimen: Nares from Nasopharyngeal Swab Updated: 01/11/21 2135     SARS-CoV-2 Negative     INFLUENZA A PCR Negative     INFLUENZA B PCR Negative     RSV PCR Negative    Narrative: This test has been authorized by FDA under an EUA (Emergency Use Assay) for use by authorized laboratories  Clinical caution and judgement should be used with the interpretation of these results with consideration of the clinical impression and other laboratory testing  Testing reported as "Positive" or "Negative" has been proven to be accurate according to standard laboratory validation requirements  All testing is performed with control materials showing appropriate reactivity at standard intervals      NT-BNP PRO [[id number]]  (Abnormal) Collected: 01/11/21 2045    Lab Status: Final result Specimen: Blood from Arm, Left Updated: 01/11/21 2121     NT-proBNP 337 pg/mL     Troponin I [[id number]]  (Normal) Collected: 01/11/21 2045    Lab Status: Final result Specimen: Blood from Arm, Left Updated: 01/11/21 2117     Troponin I <0 02 ng/mL     Comprehensive metabolic panel [[id number]]  (Abnormal) Collected: 01/11/21 2045    Lab Status: Final result Specimen: Blood from Arm, Left Updated: 01/11/21 2115     Sodium 133 mmol/L      Potassium 4 2 mmol/L      Chloride 97 mmol/L      CO2 32 mmol/L      ANION GAP 4 mmol/L      BUN 10 mg/dL      Creatinine 0 76 mg/dL      Glucose 307 mg/dL      Calcium 9 1 mg/dL      Corrected Calcium 9 6 mg/dL       U/L       U/L      Alkaline Phosphatase 193 U/L      Total Protein 6 9 g/dL      Albumin 3 4 g/dL      Total Bilirubin 0 40 mg/dL      eGFR 84 ml/min/1 73sq m     Narrative:      [place] guidelines for Chronic Kidney Disease (CKD):    Stage 1 with normal or high GFR (GFR > 90 mL/min/1 73 square meters)    Stage 2 Mild CKD (GFR = 60-89 mL/min/1 73 square meters)    Stage 3A Moderate CKD (GFR = 45-59 mL/min/1 73 square meters)    Stage 3B Moderate CKD (GFR = 30-44 mL/min/1 73 square meters)    Stage 4 Severe CKD (GFR = 15-29 mL/min/1 73 square meters)    Stage 5 End Stage CKD (GFR <15 mL/min/1 73 square meters)  Note: GFR calculation is accurate only with a steady state creatinine    CBC and differential [[id number]]  (Abnormal) Collected: 01/11/21 2045    Lab Status: Final result Specimen: Blood from Arm, Left Updated: 01/11/21 2053     WBC 12 77 Thousand/uL      RBC 4 72 Million/uL      Hemoglobin 10 6 g/dL      Hematocrit 36 3 %      MCV 77 fL      MCH 22 5 pg      MCHC 29 2 g/dL      RDW 16 6 %      MPV 11 1 fL      Platelets 625 Thousands/uL      nRBC 0 /100 WBCs      Neutrophils Relative 80 %      Immat GRANS % 1 %      Lymphocytes Relative 10 %      Monocytes Relative 6 %      Eosinophils Relative 2 %      Basophils Relative 1 %      Neutrophils Absolute 10 27 Thousands/µL      Immature Grans Absolute 0 14 Thousand/uL      Lymphocytes Absolute 1 28 Thousands/µL      Monocytes Absolute 0 73 Thousand/µL      Eosinophils Absolute 0 27 Thousand/µL      Basophils Absolute 0 08 Thousands/µL                  XR chest 1 view portable   ED Interpretation by Omar Anderson DO (01/11 2101)   Cardiomegaly with mild pulmonary vascular congestion, unchanged when compared to previous chest x-ray      Final Result by Jeniffer Reeder MD (01/12 1029)      Findings suggest pulmonary vascular congestion  Findings concur with the referring clinician's preliminary interpretation already in the patient's electronic health record  Workstation performed: [id number]                    Procedures  ECG 12 Lead Documentation Only    Date/Time: 1/11/2021 9:23 PM  Performed by: Omar Anderson DO  Authorized by:  Omar Anderson DO Indications / Diagnosis:  Shortness of breath  ECG reviewed by me, the ED Provider: yes    Patient location:  ED  Previous ECG:     Previous ECG:  Compared to current    Comparison ECG info:  11/12/2020    Similarity:  No change  Interpretation:     Interpretation: normal    Rate:     ECG rate:  77    ECG rate assessment: normal    Rhythm:     Rhythm: sinus rhythm    Ectopy:     Ectopy: none    QRS:     QRS axis:  Normal    QRS intervals:  Normal  Conduction:     Conduction: normal    ST segments:     ST segments:  Normal  T waves:     T waves: normal               ED Course  ED Course as of Jan 12 1559   Mon Jan 11, 2021 2126 Patient stable upon re-evaluation  The patient has urinated since my examination (after taking Lasix at home)  I discussed the unremarkable BMP, normal troponin, unchanged EKG and laboratories  Awaiting COVID swab results                                SBIRT 20yo+      Most Recent Value   SBIRT (22 yo +)   In order to provide better care to our patients, we are screening all of our patients for alcohol and drug use  Would it be okay to ask you these screening questions? No Filed at: 01/11/2021 2052                    MDM  Number of Diagnoses or Management Options  SOB (shortness of breath):   Diagnosis management comments: Differential diagnosis:  Exacerbation of CHF, COPD exacerbation, anemia, electrolyte disturbance  Plan is for EKG, chest x-ray, laboratories  Patient took 80 mg of p o  Lasix prior to arrival and has urinated twice  Patient is improving in the ED  Plan is for DuoNeb this time she has a COPD component to her shortness of breath      Signed out to Dr Malcolm Caballero      Disposition  Final diagnoses:   SOB (shortness of breath)     Time reflects when diagnosis was documented in both MDM as applicable and the Disposition within this note     Time User Action Codes Description Comment    1/11/2021 10:46 PM Tosin JACKSON Add [R06 02] SOB (shortness of breath)       ED Disposition     ED Disposition Condition Date/Time Comment    Discharge Stable Mon Jan 11, 2021 10:46 PM Nori Michael discharge to home/self care  Follow-up Information     Follow up With Specialties Details Why Contact Info Additional Information    Meggan Ashraf MD Family Medicine Call in 1 day  [address]  [address] [facility],[address]        [address] Emergency Department Emergency Medicine  If symptoms worsen [address] [place], [id number]  [address] [facility] Emergency Department, [address] [facility] [address]    Megha Bautista MD Pulmonary Disease, Critical Care Medicine, Pulmonology, Neurology Call  Call in the morning to let your doctor know you were in the ER again, and to see if they want to change or add any medications   [facility] [address]  [phone]             Discharge Medication List as of 1/11/2021 11:11 PM      CONTINUE these medications which have NOT CHANGED    Details   albuterol (PROVENTIL HFA,VENTOLIN HFA) 90 mcg/act inhaler Inhale 2 puffs every 4 (four) hours as needed for wheezing, Starting Fri 8/7/2020, Normal      apixaban (Eliquis) 5 mg Take 1 tablet (5 mg total) by mouth 2 (two) times a day, Starting Thu 8/20/2020, Normal      ascorbic acid (VITAMIN C) 1000 MG tablet Take 1,000 mg by mouth daily  , Historical Med      atorvastatin (LIPITOR) 40 mg tablet Take 1 tablet (40 mg total) by mouth daily, Starting Tue 11/24/2020, Normal      Blood Glucose Monitoring Suppl ([employer]iv[employer] CONTOUR NEXT MONITOR) w/Device KIT by Does not apply route daily, Starting Fri 7/14/2017, Historical Med      Cholecalciferol 1000 units tablet Take 1,000 Units by mouth daily  , Historical Med      citalopram (CeleXA) 20 mg tablet Take 1 tablet (20 mg total) by mouth daily, Starting Wed 11/4/2020, Normal      clotrimazole (LOTRIMIN) 1 % cream Apply topically 2 (two) times a day, Starting Thu 8/20/2020, Normal      cyanocobalamin (VITAMIN B-12) 100 mcg tablet Take by mouth daily, Historical Med      docusate sodium (COLACE) 100 mg capsule Take 1 capsule (100 mg total) by mouth 2 (two) times a day, Starting Mon 11/16/2020, Normal      ferrous sulfate 324 (65 Fe) mg Take 1 tablet (324 mg total) by mouth daily before breakfast, Starting Wed 10/21/2020, Until Tue 12/8/2020, Normal      flecainide (TAMBOCOR) 100 mg tablet Take 1 tablet (100 mg total) by mouth every 12 (twelve) hours, Starting Thu 8/20/2020, Normal      fluticasone (FLONASE) 50 mcg/act nasal spray 1 spray into each nostril 2 (two) times a day, Starting Wed 9/23/2020, Normal      fluticasone-umeclidinium-vilanterol (TRELEGY) 100-62 5-25 MCG/INH inhaler Inhale 1 puff daily Rinse mouth after use , Starting Fri 1/8/2021, Normal      glimepiride (AMARYL) 4 mg tablet Take 1 tablet by mouth once daily with breakfast, Normal      glucose blood (CONTOUR NEXT TEST) test strip Test blood sugar 3 times a day, Normal      levalbuterol (XOPENEX) 1 25 mg/0 5 mL nebulizer solution Take 0 5 mL (1 25 mg total) by nebulization 2 (two) times a day, Starting Mon 11/16/2020, Normal      PixstaCAN UNISTIK II LANCETS MISC by Does not apply route 3 (three) times a day, Starting Wed 2/27/2019, Normal      loratadine (Claritin) 10 mg tablet Take by mouth, Historical Med      LORazepam (ATIVAN) 0 5 mg tablet TAKE 2 TABLETS BY MOUTH AT BEDTIME AND 1 EVERY 6 HOURS AS NEEDED FOR ANXIETY, Normal      Magnesium 400 MG CAPS Take by mouth 1 BID, Historical Med      metFORMIN (GLUCOPHAGE-XR) 500 mg 24 hr tablet Take 2 tablets (1,000 mg total) by mouth 2 (two) times a day with meals, Starting Wed 10/21/2020, Normal      metoprolol succinate (TOPROL-XL) 50 mg 24 hr tablet Take 1 tablet (50 mg total) by mouth daily, Starting Thu 8/20/2020, Normal      montelukast (SINGULAIR) 10 mg tablet Take 1 tablet (10 mg total) by mouth daily at bedtime, Starting Wed 11/4/2020, Normal      naloxone (NARCAN) 4 mg/0 1 mL nasal spray Administer 1 spray into a nostril  If breathing does not return to normal or if breathing difficulty resumes after 2-3 minutes, give another dose in the other nostril using a new spray , Normal      omeprazole (PriLOSEC) 40 MG capsule Take 1 capsule (40 mg total) by mouth daily, Starting Wed 4/29/2020, Normal      potassium chloride (K-DUR,KLOR-CON) 20 mEq tablet Take 1 tablet (20 mEq total) by mouth 2 (two) times a day, Starting Thu 8/20/2020, Normal      torsemide (DEMADEX) 20 mg tablet Two tabs b i d , Normal      traZODone (DESYREL) 100 mg tablet Take 1 tablet (100 mg total) by mouth daily at bedtime, Starting Fri 12/4/2020, Normal           No discharge procedures on file      PDMP Review       Value Time User    PDMP Reviewed  Yes 12/21/2020  4:40 PM Morales Dimas MD          ED Provider  Electronically Signed by           Gabi Ruelas DO  01/12/21 7237

## 2021-01-12 NOTE — ED CARE HANDOFF
Emergency Department Sign Out Note        Sign out and transfer of care from Dr Stone Fonseca  See Separate Emergency Department note  The patient, Nelson Lora, was evaluated by the previous provider for dyspnea  Workup Completed:  Labs Reviewed   CBC AND DIFFERENTIAL - Abnormal       Result Value Ref Range Status    WBC 12 77 (*) 4 31 - 10 16 Thousand/uL Final    RBC 4 72  3 81 - 5 12 Million/uL Final    Hemoglobin 10 6 (*) 11 5 - 15 4 g/dL Final    Hematocrit 36 3  34 8 - 46 1 % Final    MCV 77 (*) 82 - 98 fL Final    MCH 22 5 (*) 26 8 - 34 3 pg Final    MCHC 29 2 (*) 31 4 - 37 4 g/dL Final    RDW 16 6 (*) 11 6 - 15 1 % Final    MPV 11 1  8 9 - 12 7 fL Final    Platelets 557  926 - 390 Thousands/uL Final    nRBC 0  /100 WBCs Final    Neutrophils Relative 80 (*) 43 - 75 % Final    Immat GRANS % 1  0 - 2 % Final    Lymphocytes Relative 10 (*) 14 - 44 % Final    Monocytes Relative 6  4 - 12 % Final    Eosinophils Relative 2  0 - 6 % Final    Basophils Relative 1  0 - 1 % Final    Neutrophils Absolute 10 27 (*) 1 85 - 7 62 Thousands/µL Final    Immature Grans Absolute 0 14  0 00 - 0 20 Thousand/uL Final    Lymphocytes Absolute 1 28  0 60 - 4 47 Thousands/µL Final    Monocytes Absolute 0 73  0 17 - 1 22 Thousand/µL Final    Eosinophils Absolute 0 27  0 00 - 0 61 Thousand/µL Final    Basophils Absolute 0 08  0 00 - 0 10 Thousands/µL Final   COMPREHENSIVE METABOLIC PANEL - Abnormal    Sodium 133 (*) 136 - 145 mmol/L Final    Potassium 4 2  3 5 - 5 3 mmol/L Final    Chloride 97 (*) 100 - 108 mmol/L Final    CO2 32  21 - 32 mmol/L Final    ANION GAP 4  4 - 13 mmol/L Final    BUN 10  5 - 25 mg/dL Final    Creatinine 0 76  0 60 - 1 30 mg/dL Final    Comment: Standardized to IDMS reference method    Glucose 307 (*) 65 - 140 mg/dL Final    Comment: If the patient is fasting, the ADA then defines impaired fasting glucose as > 100 mg/dL and diabetes as > or equal to 123 mg/dL    Specimen collection should occur prior to Sulfasalazine administration due to the potential for falsely depressed results  Specimen collection should occur prior to Sulfapyridine administration due to the potential for falsely elevated results  Calcium 9 1  8 3 - 10 1 mg/dL Final    Corrected Calcium 9 6  8 3 - 10 1 mg/dL Final     (*) 5 - 45 U/L Final    Comment: Specimen collection should occur prior to Sulfasalazine administration due to the potential for falsely depressed results   (*) 12 - 78 U/L Final    Comment: Specimen collection should occur prior to Sulfasalazine administration due to the potential for falsely depressed results  Alkaline Phosphatase 193 (*) 46 - 116 U/L Final    Total Protein 6 9  6 4 - 8 2 g/dL Final    Albumin 3 4 (*) 3 5 - 5 0 g/dL Final    Total Bilirubin 0 40  0 20 - 1 00 mg/dL Final    Comment: Use of this assay is not recommended for patients undergoing treatment with eltrombopag due to the potential for falsely elevated results  eGFR 84  ml/min/1 73sq m Final    Narrative:     [place] guidelines for Chronic Kidney Disease (CKD):     Stage 1 with normal or high GFR (GFR > 90 mL/min/1 73 square meters)    Stage 2 Mild CKD (GFR = 60-89 mL/min/1 73 square meters)    Stage 3A Moderate CKD (GFR = 45-59 mL/min/1 73 square meters)    Stage 3B Moderate CKD (GFR = 30-44 mL/min/1 73 square meters)    Stage 4 Severe CKD (GFR = 15-29 mL/min/1 73 square meters)    Stage 5 End Stage CKD (GFR <15 mL/min/1 73 square meters)  Note: GFR calculation is accurate only with a steady state creatinine   NT-BNP PRO (BRAIN NATRIURETIC PEPTIDE) - Abnormal    NT-proBNP 337 (*) <125 pg/mL Final   COVID19, INFLUENZA A/B, RSV PCR, [facility] - Normal    SARS-CoV-2 Negative  Negative Final    INFLUENZA A PCR Negative  Negative Final    INFLUENZA B PCR Negative  Negative Final    RSV PCR Negative  Negative Final    Narrative:       This test has been authorized by FDA under an EUA (Emergency Use Assay) for use by authorized laboratories  Clinical caution and judgement should be used with the interpretation of these results with consideration of the clinical impression and other laboratory testing  Testing reported as "Positive" or "Negative" has been proven to be accurate according to standard laboratory validation requirements  All testing is performed with control materials showing appropriate reactivity at standard intervals  TROPONIN I - Normal    Troponin I <0 02  <=0 04 ng/mL Final    Comment: 3Autovalidation override  [employer] Chemistry analyzer 99% cutoff is > 0 04 ng/mL in network labs     o cTnI 99% cutoff is useful only when applied to patients in the clinical setting of myocardial ischemia   o cTnI 99% cutoff should be interpreted in the context of clinical history, ECG findings and possibly cardiac imaging to establish correct diagnosis  o cTnI 99% cutoff may be suggestive but clearly not indicative of a coronary event without the clinical setting of myocardial ischemia  XR chest 1 view portable   ED Interpretation   Cardiomegaly with mild pulmonary vascular congestion, unchanged when compared to previous chest x-ray           ED Course / Workup Pending (followup):                                    ED Course as of Jan 11 2314   Mon Jan 11, 2021   2204 Pt s/o from Dr Meghna Paez  HD stable  Similar to last presentation  Getting duoneb now  Will reassess  If feeling improved,can possibly be d/c home  2227 Assessed pt  In no distress  VSS  Will pulse ox ambulate  2245 Pt ambulated w/out difficulty  She feels comfortable going home  I think this is reasonable  RTED instructions given  Pt agrees with plan  She got extra lasix today  Rather than start on steroids (which she got last time she was admitted, and bumped up her glucose), I will recommend that she touch base with her pulmonologist for additional recommendations tmrw           Procedures  MDM  Number of Diagnoses or Management Options  SOB (shortness of breath): new and requires workup     Amount and/or Complexity of Data Reviewed  Clinical lab tests: reviewed  Tests in the radiology section of CPT®: reviewed  Discussion of test results with the performing providers: yes  Decide to obtain previous medical records or to obtain history from someone other than the patient: yes  Independent visualization of images, tracings, or specimens: yes    Risk of Complications, Morbidity, and/or Mortality  Presenting problems: moderate  Diagnostic procedures: low  Management options: low  General comments: I sent message to pt's pulmonologist to ensure close f/u  Patient Progress  Patient progress: improved      Disposition  Final diagnoses:   SOB (shortness of breath)     Time reflects when diagnosis was documented in both MDM as applicable and the Disposition within this note     Time User Action Codes Description Comment    1/11/2021 10:46 PM Carry Earing Add [R06 02] SOB (shortness of breath)       ED Disposition     ED Disposition Condition Date/Time Comment    Discharge Stable Mon Jan 11, 2021 10:46 PM Nori 76 discharge to home/self care  Follow-up Information     Follow up With Specialties Details Why Contact Info Additional Information    Paulina Curry MD Family Medicine Call in 1 day  [place] [address]  [address] [facility] [address] [facility],[address]        [address] [facility] Emergency Department Emergency Medicine  If symptoms worsen [address] [place],[address] [address] [phone]  ED, [address] [place] [address], [facility] 10    Ahsan Alcala MD Pulmonary Disease, Critical Care Medicine, Pulmonology, Neurology Call  Call in the morning to let your doctor know you were in the ER again, and to see if they want to change or add any medications   [address] [facility]  [address] [facility] [address] [phone]           Patient's Medications   Discharge Prescriptions    No medications on file     No discharge procedures on file         ED Provider  Electronically Signed by     Florencio Angel MD  01/11/21 7224

## 2021-01-12 NOTE — ED NOTES
Pt's brother has arrived to  pt and bring her home  Pt ambulated out of ER with no issues or outward signs of distress        Shayy Alfred RN  01/11/21 6532

## 2021-01-12 NOTE — TELEPHONE ENCOUNTER
Call the patient as she had an ER visit on 01/11/2021 for shortness of breath found to have fluid overload was treated with diuretics she tells me she feels much better today and she lost 15 lb since yesterday    I counseled her aggressively for continued taking her water pills and keep the scale Handy and follow-up with the cardiologist to optimize her heart failure medications

## 2021-01-13 DIAGNOSIS — Z79.899 ENCOUNTER FOR LONG-TERM (CURRENT) DRUG USE: ICD-10-CM

## 2021-01-13 DIAGNOSIS — G47.00 INSOMNIA, UNSPECIFIED TYPE: ICD-10-CM

## 2021-01-13 DIAGNOSIS — I10 ESSENTIAL HYPERTENSION: ICD-10-CM

## 2021-01-13 DIAGNOSIS — E11.65 TYPE 2 DIABETES MELLITUS WITH HYPERGLYCEMIA, WITHOUT LONG-TERM CURRENT USE OF INSULIN (HCC): ICD-10-CM

## 2021-01-13 DIAGNOSIS — D50.9 MICROCYTIC ANEMIA: ICD-10-CM

## 2021-01-13 DIAGNOSIS — F32.1 CURRENT MODERATE EPISODE OF MAJOR DEPRESSIVE DISORDER WITHOUT PRIOR EPISODE (HCC): ICD-10-CM

## 2021-01-13 RX ORDER — GLIMEPIRIDE 4 MG/1
4 TABLET ORAL
Qty: 30 TABLET | Refills: 0 | Status: SHIPPED | OUTPATIENT
Start: 2021-01-13 | End: 2021-03-08 | Stop reason: SDUPTHER

## 2021-01-13 RX ORDER — FERROUS SULFATE TAB EC 324 MG (65 MG FE EQUIVALENT) 324 (65 FE) MG
324 TABLET DELAYED RESPONSE ORAL
Qty: 30 TABLET | Refills: 0 | Status: SHIPPED | OUTPATIENT
Start: 2021-01-13 | End: 2021-05-05 | Stop reason: HOSPADM

## 2021-01-13 RX ORDER — METOPROLOL SUCCINATE 50 MG/1
50 TABLET, EXTENDED RELEASE ORAL DAILY
Qty: 90 TABLET | Refills: 1 | Status: SHIPPED | OUTPATIENT
Start: 2021-01-13 | End: 2021-02-05 | Stop reason: SDUPTHER

## 2021-01-13 RX ORDER — TRAZODONE HYDROCHLORIDE 150 MG/1
TABLET ORAL
Qty: 30 TABLET | Refills: 0 | Status: SHIPPED | OUTPATIENT
Start: 2021-01-13 | End: 2021-02-05 | Stop reason: SDUPTHER

## 2021-01-13 RX ORDER — CITALOPRAM 20 MG/1
20 TABLET ORAL DAILY
Qty: 30 TABLET | Refills: 1 | Status: SHIPPED | OUTPATIENT
Start: 2021-01-13 | End: 2021-02-05 | Stop reason: SDUPTHER

## 2021-01-13 RX ORDER — TRAZODONE HYDROCHLORIDE 100 MG/1
100 TABLET ORAL
Qty: 30 TABLET | Refills: 0 | Status: SHIPPED | OUTPATIENT
Start: 2021-01-13 | End: 2021-05-02

## 2021-01-13 RX ORDER — LORAZEPAM 0.5 MG/1
TABLET ORAL
Qty: 90 TABLET | Refills: 0 | Status: SHIPPED | OUTPATIENT
Start: 2021-01-13 | End: 2021-02-05 | Stop reason: SDUPTHER

## 2021-02-05 DIAGNOSIS — I10 ESSENTIAL HYPERTENSION: ICD-10-CM

## 2021-02-05 DIAGNOSIS — I50.32 CHRONIC DIASTOLIC HEART FAILURE (HCC): ICD-10-CM

## 2021-02-05 DIAGNOSIS — Z79.899 ENCOUNTER FOR LONG-TERM (CURRENT) DRUG USE: ICD-10-CM

## 2021-02-05 DIAGNOSIS — K21.9 GASTROESOPHAGEAL REFLUX DISEASE WITHOUT ESOPHAGITIS: ICD-10-CM

## 2021-02-05 DIAGNOSIS — G47.00 INSOMNIA, UNSPECIFIED TYPE: ICD-10-CM

## 2021-02-05 DIAGNOSIS — F32.1 CURRENT MODERATE EPISODE OF MAJOR DEPRESSIVE DISORDER WITHOUT PRIOR EPISODE (HCC): ICD-10-CM

## 2021-02-05 RX ORDER — METOPROLOL SUCCINATE 50 MG/1
50 TABLET, EXTENDED RELEASE ORAL DAILY
Qty: 90 TABLET | Refills: 1 | Status: SHIPPED | OUTPATIENT
Start: 2021-02-05 | End: 2021-09-10 | Stop reason: SDUPTHER

## 2021-02-05 RX ORDER — LORAZEPAM 0.5 MG/1
TABLET ORAL
Qty: 90 TABLET | Refills: 0 | Status: SHIPPED | OUTPATIENT
Start: 2021-02-05 | End: 2021-03-08 | Stop reason: SDUPTHER

## 2021-02-05 RX ORDER — TRAZODONE HYDROCHLORIDE 150 MG/1
150 TABLET ORAL
Qty: 90 TABLET | Refills: 0 | Status: SHIPPED | OUTPATIENT
Start: 2021-02-05 | End: 2021-05-05 | Stop reason: SDUPTHER

## 2021-02-05 RX ORDER — OMEPRAZOLE 40 MG/1
40 CAPSULE, DELAYED RELEASE ORAL DAILY
Qty: 90 CAPSULE | Refills: 3 | Status: SHIPPED | OUTPATIENT
Start: 2021-02-05 | End: 2022-03-25 | Stop reason: SDUPTHER

## 2021-02-05 RX ORDER — CITALOPRAM 20 MG/1
20 TABLET ORAL DAILY
Qty: 30 TABLET | Refills: 1 | Status: SHIPPED | OUTPATIENT
Start: 2021-02-05 | End: 2021-04-16 | Stop reason: SDUPTHER

## 2021-02-05 NOTE — TELEPHONE ENCOUNTER
NOT SURE WHICH TRAZADONE SHE NEEDS - 2 ON MED LIST    PATIENT ALSO ASKED FOR FUROSEMIDE ( NOT ON MED LIST) NOT SURE IF SHE CONFUSED IT WITH TORSEMIDE?

## 2021-02-08 DIAGNOSIS — I50.32 CHRONIC DIASTOLIC HEART FAILURE (HCC): ICD-10-CM

## 2021-02-08 DIAGNOSIS — E66.9 DIABETES MELLITUS TYPE 2 IN OBESE (HCC): ICD-10-CM

## 2021-02-08 DIAGNOSIS — E11.69 DIABETES MELLITUS TYPE 2 IN OBESE (HCC): ICD-10-CM

## 2021-02-08 RX ORDER — TORSEMIDE 20 MG/1
TABLET ORAL
Qty: 120 TABLET | Refills: 0 | Status: SHIPPED | OUTPATIENT
Start: 2021-02-08 | End: 2021-03-08 | Stop reason: SDUPTHER

## 2021-02-08 RX ORDER — ATORVASTATIN CALCIUM 40 MG/1
40 TABLET, FILM COATED ORAL DAILY
Qty: 30 TABLET | Refills: 0 | Status: SHIPPED | OUTPATIENT
Start: 2021-02-08 | End: 2021-05-20 | Stop reason: SDUPTHER

## 2021-02-08 NOTE — TELEPHONE ENCOUNTER
Pt req Torsemide and Atorvastatin to [facility]  Pt missed 1/5/21 appt w/ Dr Devon Pagan  LM on VM to reschedule  Will refill x1 month

## 2021-03-08 DIAGNOSIS — I50.32 CHRONIC DIASTOLIC HEART FAILURE (HCC): ICD-10-CM

## 2021-03-08 DIAGNOSIS — Z79.899 ENCOUNTER FOR LONG-TERM (CURRENT) DRUG USE: ICD-10-CM

## 2021-03-08 DIAGNOSIS — E11.65 TYPE 2 DIABETES MELLITUS WITH HYPERGLYCEMIA, WITHOUT LONG-TERM CURRENT USE OF INSULIN (HCC): ICD-10-CM

## 2021-03-08 DIAGNOSIS — I48.91 ATRIAL FIBRILLATION, UNSPECIFIED TYPE (HCC): ICD-10-CM

## 2021-03-08 DIAGNOSIS — I50.31 ACUTE DIASTOLIC CONGESTIVE HEART FAILURE (HCC): ICD-10-CM

## 2021-03-08 RX ORDER — LORAZEPAM 0.5 MG/1
TABLET ORAL
Qty: 90 TABLET | Refills: 0 | Status: SHIPPED | OUTPATIENT
Start: 2021-03-08 | End: 2021-04-06 | Stop reason: SDUPTHER

## 2021-03-08 RX ORDER — TORSEMIDE 20 MG/1
TABLET ORAL
Qty: 120 TABLET | Refills: 0 | Status: SHIPPED | OUTPATIENT
Start: 2021-03-08 | End: 2021-04-16 | Stop reason: SDUPTHER

## 2021-03-08 RX ORDER — FLECAINIDE ACETATE 100 MG/1
100 TABLET ORAL EVERY 12 HOURS SCHEDULED
Qty: 180 TABLET | Refills: 1 | Status: SHIPPED | OUTPATIENT
Start: 2021-03-08 | End: 2021-09-24 | Stop reason: SDUPTHER

## 2021-03-08 RX ORDER — POTASSIUM CHLORIDE 20 MEQ/1
20 TABLET, EXTENDED RELEASE ORAL 2 TIMES DAILY
Qty: 180 TABLET | Refills: 1 | Status: SHIPPED | OUTPATIENT
Start: 2021-03-08 | End: 2021-09-10 | Stop reason: SDUPTHER

## 2021-03-08 RX ORDER — GLIMEPIRIDE 4 MG/1
4 TABLET ORAL
Qty: 30 TABLET | Refills: 0 | Status: ON HOLD | OUTPATIENT
Start: 2021-03-08 | End: 2021-06-02 | Stop reason: SDUPTHER

## 2021-03-10 ENCOUNTER — TELEPHONE (OUTPATIENT)
Dept: PULMONOLOGY | Facility: CLINIC | Age: 61
End: 2021-03-10

## 2021-03-11 ENCOUNTER — OFFICE VISIT (OUTPATIENT)
Dept: PULMONOLOGY | Facility: HOSPITAL | Age: 61
End: 2021-03-11
Payer: COMMERCIAL

## 2021-03-11 VITALS
SYSTOLIC BLOOD PRESSURE: 126 MMHG | HEIGHT: 63 IN | OXYGEN SATURATION: 95 % | WEIGHT: 288 LBS | DIASTOLIC BLOOD PRESSURE: 70 MMHG | TEMPERATURE: 98.6 F | HEART RATE: 76 BPM | BODY MASS INDEX: 51.03 KG/M2

## 2021-03-11 DIAGNOSIS — J44.1 ASTHMA WITH COPD WITH EXACERBATION (HCC): Primary | ICD-10-CM

## 2021-03-11 DIAGNOSIS — J96.11 CHRONIC RESPIRATORY FAILURE WITH HYPOXIA (HCC): ICD-10-CM

## 2021-03-11 DIAGNOSIS — I48.91 ATRIAL FIBRILLATION, UNSPECIFIED TYPE (HCC): ICD-10-CM

## 2021-03-11 DIAGNOSIS — I50.32 CHRONIC DIASTOLIC HEART FAILURE (HCC): ICD-10-CM

## 2021-03-11 DIAGNOSIS — J45.901 ASTHMA WITH COPD WITH EXACERBATION (HCC): Primary | ICD-10-CM

## 2021-03-11 DIAGNOSIS — J44.9 ASTHMA-COPD OVERLAP SYNDROME (HCC): ICD-10-CM

## 2021-03-11 PROCEDURE — 99214 OFFICE O/P EST MOD 30 MIN: CPT | Performed by: PHYSICIAN ASSISTANT

## 2021-03-11 PROCEDURE — 3008F BODY MASS INDEX DOCD: CPT | Performed by: PHYSICIAN ASSISTANT

## 2021-03-11 PROCEDURE — 3078F DIAST BP <80 MM HG: CPT | Performed by: PHYSICIAN ASSISTANT

## 2021-03-11 PROCEDURE — 3074F SYST BP LT 130 MM HG: CPT | Performed by: PHYSICIAN ASSISTANT

## 2021-03-11 PROCEDURE — 1036F TOBACCO NON-USER: CPT | Performed by: PHYSICIAN ASSISTANT

## 2021-03-11 RX ORDER — PREDNISONE 20 MG/1
40 TABLET ORAL DAILY
Qty: 10 TABLET | Refills: 0 | Status: SHIPPED | OUTPATIENT
Start: 2021-03-11 | End: 2021-05-02

## 2021-03-11 NOTE — PROGRESS NOTES
Assessment:    1  Asthma with COPD with exacerbation ([facility] [place] [facility] )  predniSONE 20 mg tablet    glycopyrrolate-formoterol (BEVESPI AEROSPHERE) 9-4 8 MCG/ACT inhaler   2  Asthma-COPD overlap syndrome ([facility] [place] [facility] )     3  Chronic respiratory failure with hypoxia (HCC)     4  Chronic diastolic heart failure ([facility] [place] [facility] )     5  Atrial fibrillation, unspecified type [facility])         January 11, 2021 portable chest x-ray reviewed  Pulmonary vascular congestion noted summer to previous examination  11/13/2020 echocardiogram reviewed  Estimated EF 55%  Grade 2 diastolic dysfunction  Right ventricle with moderately marked increase systolic pressure with peak pressure 55 mmHg    October 2020 pulmonary function testing reviewed  FEV1 36% predicted  %  Normal ratio 76 % interpreted as severe obstruction with mild decrease in diffusion flow volume loop with combined obstruction and restriction  Forced vital capacity 47% predicted    Plan: Will continue supplemental oxygen 4 liters/minute continuously  Will increase her Xopenex to 4 times daily for the next several days will acute exacerbation  Will continue her Bevespi BID  Still trying to get Trelegy approved  Prednisone burst 40 mg daily x5 days  Patient instructed to call if still symptomatic once completed  Reinforced importance for patient to go to emergency room with any worsening symptoms  Patient acknowledged and agreeable  Patient recommended to make follow-up appointment with Cardiology for her heart failure and AFib  Will follow-up in 4 weeks or sooner if needed  Instructed to call with any questions or concerns  Subjective:     Patient ID: Ronnie Corral is a 56 y o  female  Chief Complaint:  Dyspnea  HPI  67-year-old female here today for sick visit  Patient with history of asthma/COPD overlap, chronic hypoxic respiratory failure, WILMA, diastolic heart failure and atrial fibrillation on Eliquis    Patient states she has been getting progressively more short of breath over the past several weeks  Typically dry cough however some days she does produce some sputum which is clear  Patient does admit to some weight gain over the past weeks  However is denying any real increase in lower extremity edema  Patient states using on 4 liters/minute of O2 at home  Patient's current pulmonary medications consist of albuterol, Xopenex and Bevespi  Patient is trying to get qualified for Trelegy through her insurance  Patient denies any fevers, chills, chest pain  Positive chest tightness, dyspnea at rest and with activity  Cough as above  Review of Systems  Per HPI  All other systems negative    Objective:   /70 (BP Location: Left arm, Patient Position: Sitting, Cuff Size: Standard)   Pulse 76   Temp 98 6 °F (37 °C) (Tympanic)   Ht 5' 3" (1 6 m)   Wt 131 kg (288 lb)   SpO2 95%   BMI 51 02 kg/m²     Physical Exam  Vitals signs and nursing note reviewed  Constitutional:       General: She is not in acute distress  Appearance: She is obese  She is not toxic-appearing or diaphoretic  HENT:      Head: Normocephalic  Nose: Nose normal       Mouth/Throat:      Mouth: Mucous membranes are moist       Pharynx: Oropharynx is clear  Eyes:      General: No scleral icterus  Conjunctiva/sclera: Conjunctivae normal    Cardiovascular:      Rate and Rhythm: Normal rate and regular rhythm  Pulmonary:      Effort: Pulmonary effort is normal  No respiratory distress  Breath sounds: Wheezing present  No rhonchi or rales  Comments: Diminished breath sounds throughout  Chest:      Chest wall: No tenderness  Musculoskeletal:      Right lower leg: Edema present  Left lower leg: Edema present  Comments: Nonpitting lower extremity edema  Bilaterally without calf pain  Neurological:      General: No focal deficit present  Mental Status: She is alert and oriented to person, place, and time     Psychiatric:         Mood and Affect: Mood normal

## 2021-03-30 ENCOUNTER — TELEPHONE (OUTPATIENT)
Dept: FAMILY MEDICINE CLINIC | Facility: HOSPITAL | Age: 61
End: 2021-03-30

## 2021-03-30 NOTE — TELEPHONE ENCOUNTER
Received call from [facility] asking for clinical notes from the last 6 months with documentation of O2 sats less that or equal to 88%  They need info faxed to [phone]  Not sure who orders her oxygen, sherlyn said that they could not reach the dr who ordered it previously, but would not tell me the name  Not sure if this is something that should be given to pulmonology to handle  Please advise

## 2021-04-01 ENCOUNTER — TELEPHONE (OUTPATIENT)
Dept: PULMONOLOGY | Facility: CLINIC | Age: 61
End: 2021-04-01

## 2021-04-01 NOTE — TELEPHONE ENCOUNTER
Ruth calling from patient's PCP office calling stating [facility] contacted them in regards to documentation they need for the patient's o2  She stated they told her they couldn't get in contact with us  She spoke with a representative named Lisbet Sy  The number he provided for [employer] is [phone] and their fax is [phone]   Please advise

## 2021-04-05 NOTE — TELEPHONE ENCOUNTER
Sent requested documents to Michell Hsieh to let her know  Mattie then stated that she is still experiencing increased SOB, I asked her to check her pulse ox while on the phone with me, she is currently at 95% on 4L of O2  She is coughing on the phone with me, when she lays down with her CPAP on she feels much better

## 2021-04-06 DIAGNOSIS — Z79.899 ENCOUNTER FOR LONG-TERM (CURRENT) DRUG USE: ICD-10-CM

## 2021-04-06 NOTE — TELEPHONE ENCOUNTER
Spoke with Mattie  She said he has had increased SOB  She said it is non stop and it is getting worse  She does have a little cough in the AM   She said she probably has wheezing  She has chest tightness  When she wears her CPAP she feels better  She is wearing 4L of oxygen and her pulse ox has been 95-96%  She is unsure if it drops on ambulation  She is using her nebulizer  Scheduled her for a sick visit tomorrow with Dr Lisa Cintron

## 2021-04-07 ENCOUNTER — OFFICE VISIT (OUTPATIENT)
Dept: PULMONOLOGY | Facility: HOSPITAL | Age: 61
End: 2021-04-07
Payer: COMMERCIAL

## 2021-04-07 VITALS
TEMPERATURE: 98.7 F | WEIGHT: 289.2 LBS | OXYGEN SATURATION: 90 % | HEART RATE: 86 BPM | HEIGHT: 63 IN | DIASTOLIC BLOOD PRESSURE: 68 MMHG | SYSTOLIC BLOOD PRESSURE: 128 MMHG | BODY MASS INDEX: 51.24 KG/M2

## 2021-04-07 DIAGNOSIS — I50.31 ACUTE DIASTOLIC CONGESTIVE HEART FAILURE (HCC): ICD-10-CM

## 2021-04-07 DIAGNOSIS — J45.51 SEVERE PERSISTENT ASTHMA WITH EXACERBATION: ICD-10-CM

## 2021-04-07 DIAGNOSIS — G47.33 OBSTRUCTIVE SLEEP APNEA: Primary | ICD-10-CM

## 2021-04-07 DIAGNOSIS — J96.11 CHRONIC RESPIRATORY FAILURE WITH HYPOXIA (HCC): ICD-10-CM

## 2021-04-07 PROCEDURE — 3078F DIAST BP <80 MM HG: CPT | Performed by: INTERNAL MEDICINE

## 2021-04-07 PROCEDURE — 3074F SYST BP LT 130 MM HG: CPT | Performed by: INTERNAL MEDICINE

## 2021-04-07 PROCEDURE — 99215 OFFICE O/P EST HI 40 MIN: CPT | Performed by: INTERNAL MEDICINE

## 2021-04-07 RX ORDER — LORAZEPAM 0.5 MG/1
TABLET ORAL
Qty: 90 TABLET | Refills: 0 | Status: SHIPPED | OUTPATIENT
Start: 2021-04-07 | End: 2021-05-05 | Stop reason: SDUPTHER

## 2021-04-07 NOTE — PROGRESS NOTES
Assessment:    Obstructive sleep apnea   She has been using her CPAP on a nightly basis  She also uses it sometimes during the day  I encouraged ongoing use every night  She has no issues, and needs no refills/replacement of equipment  Severe persistent asthma with exacerbation    At this time I do not believe she is having an asthma exacerbation  Her worsening respiratory status seems to be volume related  She has lower extremity edema, abdominal distension/ edema  I encouraged her to continue use of diuretics   And more importantly following a low-sodium diet    I asked her to get a chest x-ray, lab work  I put in a referral for her to see our heart failure specialist, in the interim she should call in get an appointment with her routine cardiologists to discuss possibly increasing diuretics    Chronic respiratory failure with hypoxia ([facility] [address] )   She will continue supplemental oxygen to keep her sats greater than 90%  Plan:    Diagnoses and all orders for this visit:    Obstructive sleep apnea    Severe persistent asthma with exacerbation    Chronic respiratory failure with hypoxia (HCC)    Acute diastolic congestive heart failure (HCC)  -     XR chest pa & lateral; Future  -     NT-BNP PRO; Future  -     Comprehensive metabolic panel; Future  -     CBC and differential; Future  -     NT-BNP PRO  -     Comprehensive metabolic panel  -     CBC and differential  -     Ambulatory referral to Cardiology;  Future        Follow-up:   3 weeks with Dr Melburn Primrose      HPI:  She is here today because she can't breathe  She gets short of breath with minimal exertion - simply walking to the bathroom  She only feels well if she is lying in bed on CPAP    She takes a water pill twice daily - tries to follow low sodium diet  She has pain in her feet and she feels like there are bricks in them  She has lower extremity edema    She is taking Bevespi twice daily  She uses the rescue inhaler 2-3 times per day - and she feels like it helps    Review of Systems   Constitutional: Positive for activity change and unexpected weight change  Negative for fever  Respiratory: Positive for cough, shortness of breath and wheezing  Cardiovascular: Positive for chest pain and leg swelling  Gastrointestinal: Positive for abdominal distention  Genitourinary: Negative for difficulty urinating  Musculoskeletal: Positive for gait problem  Skin: Negative for rash  Neurological: Positive for weakness  Psychiatric/Behavioral: Negative for sleep disturbance  All other systems reviewed and are negative        The following portions of the patient's history were reviewed and updated as appropriate: allergies, current medications, past family history, past medical history, past social history, past surgical history and problem list     VITALS:  Vitals:    04/07/21 1456   BP: 128/68   BP Location: Left arm   Patient Position: Sitting   Pulse: 86   Temp: 98 7 °F (37 1 °C)   TempSrc: Tympanic   SpO2: 90%   Weight: 131 kg (289 lb 3 2 oz)   Height: 5' 3" (1 6 m)       Physical Exam  General:  Patient is awake, alert, With mild conversational dyspnea  Neck: unable to assess for JVD  CV:  Regular, +S1 and S2, No murmurs, gallops or rubs appreciated  Lungs:  Diminished breath sounds throughout, with bibasilar crackles, no wheezes appreciated  Abdomen: Soft, +BS,  Distended, taut, nontender to  palpation  Extremities:  Extensive bilateral pitting edema  Neuro: No focal deficits    Diagnostic Testing:    CBC:  Lab Results   Component Value Date    WBC 12 77 (H) 01/11/2021    HGB 10 6 (L) 01/11/2021    HCT 36 3 01/11/2021    MCV 77 (L) 01/11/2021     01/11/2021         BMP:   Lab Results   Component Value Date     09/22/2017    K 4 2 01/11/2021    CL 97 (L) 01/11/2021    CO2 32 01/11/2021    BUN 10 01/11/2021    CREATININE 0 76 01/11/2021    GLUCOSE 131 (H) 09/22/2017    CALCIUM 9 1 01/11/2021    CORRECTEDCA 9 6 01/11/2021     (H) 01/11/2021     (H) 01/11/2021    ALKPHOS 193 (H) 01/11/2021    PROT 6 6 09/22/2017    BILITOT 0 4 09/22/2017    EGFR 84 01/11/2021         Imaging studies:  I have personally reviewed pertinent films in PACS   chest x-ray January 2021: Diffuse bilateral pulmonary edema, cardiomegaly      Staci Wheeler DO

## 2021-04-08 NOTE — ASSESSMENT & PLAN NOTE
She has been using her CPAP on a nightly basis  She also uses it sometimes during the day  I encouraged ongoing use every night  She has no issues, and needs no refills/replacement of equipment

## 2021-04-08 NOTE — ASSESSMENT & PLAN NOTE
At this time I do not believe she is having an asthma exacerbation  Her worsening respiratory status seems to be volume related  She has lower extremity edema, abdominal distension/ edema  I encouraged her to continue use of diuretics   And more importantly following a low-sodium diet    I asked her to get a chest x-ray, lab work      I put in a referral for her to see our heart failure specialist, in the interim she should call in get an appointment with her routine cardiologists to discuss possibly increasing diuretics

## 2021-04-15 ENCOUNTER — APPOINTMENT (EMERGENCY)
Dept: RADIOLOGY | Facility: HOSPITAL | Age: 61
End: 2021-04-15
Payer: COMMERCIAL

## 2021-04-15 ENCOUNTER — HOSPITAL ENCOUNTER (EMERGENCY)
Facility: HOSPITAL | Age: 61
Discharge: HOME | End: 2021-04-15
Attending: EMERGENCY MEDICINE
Payer: COMMERCIAL

## 2021-04-15 VITALS
WEIGHT: 290 LBS | SYSTOLIC BLOOD PRESSURE: 141 MMHG | HEIGHT: 63 IN | OXYGEN SATURATION: 95 % | DIASTOLIC BLOOD PRESSURE: 79 MMHG | BODY MASS INDEX: 51.38 KG/M2 | RESPIRATION RATE: 20 BRPM | TEMPERATURE: 98.3 F | HEART RATE: 68 BPM

## 2021-04-15 DIAGNOSIS — J96.11 CHRONIC RESPIRATORY FAILURE WITH HYPOXIA (HCC): Primary | ICD-10-CM

## 2021-04-15 DIAGNOSIS — G47.33 OBSTRUCTIVE SLEEP APNEA: ICD-10-CM

## 2021-04-15 DIAGNOSIS — J45.50 SEVERE PERSISTENT ASTHMA: ICD-10-CM

## 2021-04-15 LAB
ALBUMIN SERPL BCP-MCNC: 3.4 G/DL (ref 3.5–5)
ALBUMIN SERPL-MCNC: 4.1 G/DL (ref 3.8–4.8)
ALBUMIN/GLOB SERPL: 2 {RATIO} (ref 1.2–2.2)
ALP SERPL-CCNC: 180 IU/L (ref 39–117)
ALP SERPL-CCNC: 190 U/L (ref 46–116)
ALT SERPL W P-5'-P-CCNC: 58 U/L (ref 12–78)
ALT SERPL-CCNC: 41 IU/L (ref 0–32)
ANION GAP SERPL CALCULATED.3IONS-SCNC: 10 MMOL/L (ref 4–13)
AST SERPL W P-5'-P-CCNC: 30 U/L (ref 5–45)
AST SERPL-CCNC: 33 IU/L (ref 0–40)
BASOPHILS # BLD AUTO: 0.06 THOUSANDS/ΜL (ref 0–0.1)
BASOPHILS # BLD AUTO: 0.1 X10E3/UL (ref 0–0.2)
BASOPHILS NFR BLD AUTO: 1 %
BASOPHILS NFR BLD AUTO: 1 % (ref 0–1)
BILIRUB SERPL-MCNC: 0.2 MG/DL (ref 0–1.2)
BILIRUB SERPL-MCNC: 0.3 MG/DL (ref 0.2–1)
BUN SERPL-MCNC: 11 MG/DL (ref 8–27)
BUN SERPL-MCNC: 12 MG/DL (ref 5–25)
BUN/CREAT SERPL: 10 (ref 12–28)
CALCIUM ALBUM COR SERPL-MCNC: 9.3 MG/DL (ref 8.3–10.1)
CALCIUM SERPL-MCNC: 8.8 MG/DL (ref 8.3–10.1)
CALCIUM SERPL-MCNC: 9 MG/DL (ref 8.7–10.3)
CHLORIDE SERPL-SCNC: 88 MMOL/L (ref 96–106)
CHLORIDE SERPL-SCNC: 96 MMOL/L (ref 100–108)
CO2 SERPL-SCNC: 29 MMOL/L (ref 20–29)
CO2 SERPL-SCNC: 31 MMOL/L (ref 21–32)
CREAT SERPL-MCNC: 0.77 MG/DL (ref 0.6–1.3)
CREAT SERPL-MCNC: 1.12 MG/DL (ref 0.57–1)
EOSINOPHIL # BLD AUTO: 0.27 THOUSAND/ΜL (ref 0–0.61)
EOSINOPHIL # BLD AUTO: 0.3 X10E3/UL (ref 0–0.4)
EOSINOPHIL NFR BLD AUTO: 2 %
EOSINOPHIL NFR BLD AUTO: 2 % (ref 0–6)
ERYTHROCYTE [DISTWIDTH] IN BLOOD BY AUTOMATED COUNT: 15.2 % (ref 11.7–15.4)
ERYTHROCYTE [DISTWIDTH] IN BLOOD BY AUTOMATED COUNT: 17.2 % (ref 11.6–15.1)
GFR SERPL CREATININE-BSD FRML MDRD: 83 ML/MIN/1.73SQ M
GLOBULIN SER-MCNC: 2.1 G/DL (ref 1.5–4.5)
GLUCOSE SERPL-MCNC: 256 MG/DL (ref 65–140)
GLUCOSE SERPL-MCNC: 356 MG/DL (ref 65–99)
HCT VFR BLD AUTO: 36.8 % (ref 34–46.6)
HCT VFR BLD AUTO: 38.3 % (ref 34.8–46.1)
HGB BLD-MCNC: 10.7 G/DL (ref 11.1–15.9)
HGB BLD-MCNC: 11.4 G/DL (ref 11.5–15.4)
IMM GRANULOCYTES # BLD AUTO: 0.06 THOUSAND/UL (ref 0–0.2)
IMM GRANULOCYTES # BLD: 0 X10E3/UL (ref 0–0.1)
IMM GRANULOCYTES NFR BLD AUTO: 1 % (ref 0–2)
IMM GRANULOCYTES NFR BLD: 0 %
LYMPHOCYTES # BLD AUTO: 1.3 X10E3/UL (ref 0.7–3.1)
LYMPHOCYTES # BLD AUTO: 1.64 THOUSANDS/ΜL (ref 0.6–4.47)
LYMPHOCYTES NFR BLD AUTO: 11 %
LYMPHOCYTES NFR BLD AUTO: 13 % (ref 14–44)
MCH RBC QN AUTO: 21.5 PG (ref 26.6–33)
MCH RBC QN AUTO: 21.9 PG (ref 26.8–34.3)
MCHC RBC AUTO-ENTMCNC: 29.1 G/DL (ref 31.5–35.7)
MCHC RBC AUTO-ENTMCNC: 29.8 G/DL (ref 31.4–37.4)
MCV RBC AUTO: 74 FL (ref 79–97)
MCV RBC AUTO: 74 FL (ref 82–98)
MONOCYTES # BLD AUTO: 0.8 X10E3/UL (ref 0.1–0.9)
MONOCYTES # BLD AUTO: 1 THOUSAND/ΜL (ref 0.17–1.22)
MONOCYTES NFR BLD AUTO: 7 %
MONOCYTES NFR BLD AUTO: 8 % (ref 4–12)
NEUTROPHILS # BLD AUTO: 10.01 THOUSANDS/ΜL (ref 1.85–7.62)
NEUTROPHILS # BLD AUTO: 9.2 X10E3/UL (ref 1.4–7)
NEUTROPHILS NFR BLD AUTO: 79 %
NEUTS SEG NFR BLD AUTO: 75 % (ref 43–75)
NRBC BLD AUTO-RTO: 0 /100 WBCS
NT-PROBNP SERPL-MCNC: 131 PG/ML
NT-PROBNP SERPL-MCNC: 88 PG/ML (ref 0–287)
PLATELET # BLD AUTO: 351 X10E3/UL (ref 150–450)
PLATELET # BLD AUTO: 356 THOUSANDS/UL (ref 149–390)
PMV BLD AUTO: 11.3 FL (ref 8.9–12.7)
POTASSIUM SERPL-SCNC: 4.2 MMOL/L (ref 3.5–5.3)
POTASSIUM SERPL-SCNC: 4.3 MMOL/L (ref 3.5–5.2)
PROT SERPL-MCNC: 6.2 G/DL (ref 6–8.5)
PROT SERPL-MCNC: 6.9 G/DL (ref 6.4–8.2)
RBC # BLD AUTO: 4.97 X10E6/UL (ref 3.77–5.28)
RBC # BLD AUTO: 5.2 MILLION/UL (ref 3.81–5.12)
SL AMB EGFR AFRICAN AMERICAN: 61 ML/MIN/1.73
SL AMB EGFR NON AFRICAN AMERICAN: 53 ML/MIN/1.73
SODIUM SERPL-SCNC: 135 MMOL/L (ref 134–144)
SODIUM SERPL-SCNC: 137 MMOL/L (ref 136–145)
TROPONIN I SERPL-MCNC: <0.02 NG/ML
WBC # BLD AUTO: 11.7 X10E3/UL (ref 3.4–10.8)
WBC # BLD AUTO: 13.04 THOUSAND/UL (ref 4.31–10.16)

## 2021-04-15 PROCEDURE — 71045 X-RAY EXAM CHEST 1 VIEW: CPT

## 2021-04-15 PROCEDURE — 93005 ELECTROCARDIOGRAM TRACING: CPT

## 2021-04-15 PROCEDURE — 99285 EMERGENCY DEPT VISIT HI MDM: CPT

## 2021-04-15 PROCEDURE — 99285 EMERGENCY DEPT VISIT HI MDM: CPT | Performed by: EMERGENCY MEDICINE

## 2021-04-15 PROCEDURE — 80053 COMPREHEN METABOLIC PANEL: CPT | Performed by: EMERGENCY MEDICINE

## 2021-04-15 PROCEDURE — 85025 COMPLETE CBC W/AUTO DIFF WBC: CPT | Performed by: EMERGENCY MEDICINE

## 2021-04-15 PROCEDURE — 84484 ASSAY OF TROPONIN QUANT: CPT | Performed by: EMERGENCY MEDICINE

## 2021-04-15 PROCEDURE — 36415 COLL VENOUS BLD VENIPUNCTURE: CPT

## 2021-04-15 PROCEDURE — 83880 ASSAY OF NATRIURETIC PEPTIDE: CPT | Performed by: EMERGENCY MEDICINE

## 2021-04-15 NOTE — PROGRESS NOTES
I called the patient with results of BMP that showed kidney failure and hyperglycemia  She tells me she takes only oral medications for DM  She is not on steroids    We talked that CXR report states pulmonary edema but BNP suggests this isn't significant heart failure    I recommended that she be evaluated by ED   She would like to see her PCP - she will call them now for a sick visit    If she can't be seen today or tomorrow then I told her to go to the ED

## 2021-04-15 NOTE — ED PROVIDER NOTES
History  Chief Complaint   Patient presents with    Shortness of Breath     recent cxray shows chf; recent blood work with increase BS and decrease kidney fx; Dr Eliana Orozco told her to come in     This 57-year-old female with history of obstructive sleep apnea, severe persistent asthma, chronic respiratory failure with hypoxemia and acute diastolic congestive heart failure complains of worsening dyspnea on exertion  She has had this for several months but it is worse lately  Says that she has been gaining weight and has more pedal edema recently although she is trying to follow a low-sodium diet  Does state she is compliant with her medications  She states that her pulmonologist saw her recently and ordered some lab work  She was called today by the pulmonary office saying that she needs to come to the hospital because of acute kidney injury  Patient denies chest pain and palpitations  She has a chronic intermittent cough productive of clear sputum  She has abdominal pain that is intermittent  Prior to Admission Medications   Prescriptions Last Dose Informant Patient Reported? Taking?    Blood Glucose Monitoring Suppl ("[employer]" CONTOUR NEXT MONITOR) w/Device KIT  Self Yes No   Sig: by Does not apply route daily   Cholecalciferol 1000 units tablet  Self Yes No   Sig: Take 1,000 Units by mouth daily     LIFESCAN UNISTIK II LANCETS MISC  Self No No   Sig: by Does not apply route 3 (three) times a day   LORazepam (ATIVAN) 0 5 mg tablet   No No   Sig: TAKE 2 TABLETS BY MOUTH AT BEDTIME AND 1 EVERY 6 HOURS AS NEEDED FOR ANXIETY   Magnesium 400 MG CAPS  Self Yes No   Sig: Take by mouth 1 BID   albuterol (PROVENTIL HFA,VENTOLIN HFA) 90 mcg/act inhaler  Self No No   Sig: Inhale 2 puffs every 4 (four) hours as needed for wheezing   apixaban (Eliquis) 5 mg  Self No No   Sig: Take 1 tablet (5 mg total) by mouth 2 (two) times a day   ascorbic acid (VITAMIN C) 1000 MG tablet  Self Yes No   Sig: Take 1,000 mg by mouth daily     atorvastatin (LIPITOR) 40 mg tablet  Self No No   Sig: Take 1 tablet (40 mg total) by mouth daily   citalopram (CeleXA) 20 mg tablet  Self No No   Sig: Take 1 tablet (20 mg total) by mouth daily   clotrimazole (LOTRIMIN) 1 % cream  Self No No   Sig: Apply topically 2 (two) times a day   Patient not taking: Reported on 3/11/2021   cyanocobalamin (VITAMIN B-12) 100 mcg tablet  Self Yes No   Sig: Take by mouth daily   docusate sodium (COLACE) 100 mg capsule  Self No No   Sig: Take 1 capsule (100 mg total) by mouth 2 (two) times a day   ferrous sulfate 324 (65 Fe) mg   No No   Sig: Take 1 tablet (324 mg total) by mouth daily before breakfast   flecainide (TAMBOCOR) 100 mg tablet  Self No No   Sig: Take 1 tablet (100 mg total) by mouth every 12 (twelve) hours   fluticasone (FLONASE) 50 mcg/act nasal spray  Self No No   Si spray into each nostril 2 (two) times a day   fluticasone-umeclidinium-vilanterol (TRELEGY) 100-62 5-25 MCG/INH inhaler  Self No No   Sig: Inhale 1 puff daily Rinse mouth after use     Patient not taking: Reported on 3/11/2021   glimepiride (AMARYL) 4 mg tablet  Self No No   Sig: Take 1 tablet (4 mg total) by mouth daily with breakfast   glucose blood (CONTOUR NEXT TEST) test strip  Self No No   Sig: Test blood sugar 3 times a day   glycopyrrolate-formoterol (BEVESPI AEROSPHERE) 9-4 8 MCG/ACT inhaler   No No   Sig: Inhale 2 puffs 2 (two) times a day   levalbuterol (XOPENEX) 1 25 mg/0 5 mL nebulizer solution  Self No No   Sig: Take 0 5 mL (1 25 mg total) by nebulization 2 (two) times a day   loratadine (Claritin) 10 mg tablet  Self Yes No   Sig: Take by mouth   metFORMIN (GLUCOPHAGE-XR) 500 mg 24 hr tablet  Self No No   Sig: Take 2 tablets (1,000 mg total) by mouth 2 (two) times a day with meals   metoprolol succinate (TOPROL-XL) 50 mg 24 hr tablet  Self No No   Sig: Take 1 tablet (50 mg total) by mouth daily   montelukast (SINGULAIR) 10 mg tablet  Self No No   Sig: Take 1 tablet (10 mg total) by mouth daily at bedtime   naloxone (NARCAN) 4 mg/0 1 mL nasal spray  Self No No   Sig: Administer 1 spray into a nostril  If breathing does not return to normal or if breathing difficulty resumes after 2-3 minutes, give another dose in the other nostril using a new spray     omeprazole (PriLOSEC) 40 MG capsule  Self No No   Sig: Take 1 capsule (40 mg total) by mouth daily   potassium chloride (K-DUR,KLOR-CON) 20 mEq tablet  Self No No   Sig: Take 1 tablet (20 mEq total) by mouth 2 (two) times a day   predniSONE 20 mg tablet   No No   Sig: Take 2 tablets (40 mg total) by mouth daily   Patient not taking: Reported on 2021   torsemide (DEMADEX) 20 mg tablet  Self No No   Sig: Two tabs b i d    traZODone (DESYREL) 100 mg tablet  Self No No   Sig: Take 1 tablet (100 mg total) by mouth daily at bedtime   Patient not taking: Reported on 3/11/2021   traZODone (DESYREL) 150 mg tablet  Self No No   Sig: Take 1 tablet (150 mg total) by mouth daily at bedtime      Facility-Administered Medications: None       Past Medical History:   Diagnosis Date    Alcohol abuse 2020    Alcohol abuse 2020    Anemia     Atrial fibrillation (HCC)     Cervical radiculopathy     CHF (congestive heart failure) (HCC)     Chronic low back pain     Chronic obstructive asthma ([facility] ) 2018    Community acquired pneumonia     Community acquired pneumonia 2020    Diabetes mellitus ([facility] )     Diabetes mellitus with hyperglycemia ([facility] )     Elevated liver enzymes     GERD (gastroesophageal reflux disease)     Hypersomnolence 10/28/2020    Hypokalemia 2018    Paresthesia of upper extremity     Plantar fasciitis     Restless leg     Sexual dysfunction     Sleep apnea, obstructive     Tenosynovitis of finger     Tinea corporis     Weakness of upper extremity        Past Surgical History:   Procedure Laterality Date    ABDOMINAL SURGERY      CARPAL TUNNEL RELEASE Bilateral      SECTION      CHOLECYSTECTOMY      laparoscopic    ESOPHAGOGASTRODUODENOSCOPY N/A 12/3/2018    Procedure: ESOPHAGOGASTRODUODENOSCOPY (EGD) AND COLONOSCOPY;  Surgeon: Sandra Dunn MD;  [facility];  Service: Gastroenterology    GASTRIC BYPASS      for morbid obesity with gilda en y   [address] PRIMARY GANGLION WRIST Left 2017    Procedure: LONG FINGER GANGLION CYST EXCISION;  Surgeon: Torin Caldwell MD;  [facility];  Service: Orthopedics    [place] INCISE FINGER TENDON SHEATH Left 2017    Procedure: Dorita Specking, RING, TRIGGER FINGER RELEASE;  Surgeon: Torin Caldwell MD;  [facility];  Service: Orthopedics    SHOULDER SURGERY         Family History   Problem Relation Age of Onset    Alzheimer's disease Mother     Atrial fibrillation Mother     Dementia Mother     Heart disease Mother         heart problem    Seizures Mother     Parkinsonism Father     Arthritis Father     Atrial fibrillation Father     Substance Abuse Neg Hx         mother,father    Mental illness Neg Hx         mother,father     I have reviewed and agree with the history as documented      E-Cigarette/Vaping    E-Cigarette Use Never User      E-Cigarette/Vaping Substances    Nicotine No     THC No     CBD No     Flavoring No     Other No     Unknown No      Social History     Tobacco Use    Smoking status: Former Smoker     Packs/day: 0 25     Years: 29 00     Pack years: 7 25     Types: Cigarettes     Start date: 200     Quit date: 12/10/2019     Years since quittin 3    Smokeless tobacco: Never Used   Substance Use Topics    Alcohol use: Not Currently     Alcohol/week: 20 0 standard drinks     Types: 20 Cans of beer per week     Frequency: 4 or more times a week     Drinks per session: 3 or 4     Binge frequency: Never     Comment: about 6 coors light every night, stopped 3 weeks ago     Drug use: No       Review of Systems   Constitutional: Positive for activity change, fatigue and unexpected weight change  Negative for fever  HENT: Negative  Eyes: Negative  Respiratory: Positive for cough, shortness of breath and wheezing  Cardiovascular: Positive for chest pain (Occasionally) and leg swelling  Negative for palpitations  Gastrointestinal: Positive for abdominal pain ( frequently)  Negative for constipation, diarrhea, nausea and vomiting  Endocrine: Negative  Genitourinary: Negative  Musculoskeletal: Positive for myalgias  Skin: Negative  Allergic/Immunologic: Negative  Neurological: Negative  Hematological: Negative  Psychiatric/Behavioral: Negative  All other systems reviewed and are negative  Physical Exam  Physical Exam  Vitals signs and nursing note reviewed  Constitutional:       General: She is not in acute distress  Appearance: She is well-developed  She is obese  She is ill-appearing  She is not diaphoretic  HENT:      Head: Normocephalic and atraumatic  Mouth/Throat:      Mouth: Mucous membranes are moist       Pharynx: Oropharynx is clear  No oropharyngeal exudate  Eyes:      Conjunctiva/sclera: Conjunctivae normal       Pupils: Pupils are equal, round, and reactive to light  Neck:      Musculoskeletal: Normal range of motion and neck supple  Cardiovascular:      Rate and Rhythm: Normal rate and regular rhythm  Pulses: Normal pulses  Heart sounds: No murmur  Pulmonary:      Effort: Pulmonary effort is normal       Breath sounds: Decreased breath sounds present  No wheezing or rales  Abdominal:      General: Bowel sounds are normal       Palpations: Abdomen is soft  Tenderness: There is abdominal tenderness (Mild left lower quadrant)  There is no guarding or rebound  Musculoskeletal: Normal range of motion  Right lower leg: She exhibits no tenderness  Edema present  Left lower leg: She exhibits no tenderness  Edema present     Lymphadenopathy:      Cervical: No cervical adenopathy  Skin:     General: Skin is warm and dry  Capillary Refill: Capillary refill takes less than 2 seconds  Findings: No rash  Neurological:      General: No focal deficit present  Mental Status: She is alert and oriented to person, place, and time  Cranial Nerves: No cranial nerve deficit  Coordination: Coordination normal       Deep Tendon Reflexes: Reflexes are normal and symmetric     Psychiatric:         Mood and Affect: Mood normal          Behavior: Behavior normal          Vital Signs  ED Triage Vitals   Temperature Pulse Respirations Blood Pressure SpO2   04/15/21 1639 04/15/21 1656 04/15/21 1639 04/15/21 1639 04/15/21 1639   98 3 °F (36 8 °C) 67 22 109/59 95 %      Temp Source Heart Rate Source Patient Position - Orthostatic VS BP Location FiO2 (%)   04/15/21 1639 04/15/21 1656 04/15/21 1639 04/15/21 1639 --   Tympanic Monitor Sitting Right arm       Pain Score       --                  Vitals:    04/15/21 1730 04/15/21 1800 04/15/21 1815 04/15/21 1830   BP: 116/56  118/58 141/79   Pulse: 67 65 66 68   Patient Position - Orthostatic VS: Sitting Sitting  Sitting         Visual Acuity      ED Medications  Medications - No data to display    Diagnostic Studies  Results Reviewed     Procedure Component Value Units Date/Time    NT-BNP PRO [[id number]]  (Abnormal) Collected: 04/15/21 1704    Lab Status: Final result Specimen: Blood from Arm, Right Updated: 04/15/21 1827     NT-proBNP 131 pg/mL     Comprehensive metabolic panel [[id number]]  (Abnormal) Collected: 04/15/21 1704    Lab Status: Final result Specimen: Blood from Arm, Right Updated: 04/15/21 1727     Sodium 137 mmol/L      Potassium 4 2 mmol/L      Chloride 96 mmol/L      CO2 31 mmol/L      ANION GAP 10 mmol/L      BUN 12 mg/dL      Creatinine 0 77 mg/dL      Glucose 256 mg/dL      Calcium 8 8 mg/dL      Corrected Calcium 9 3 mg/dL      AST 30 U/L      ALT 58 U/L      Alkaline Phosphatase 190 U/L      Total Protein 6 9 g/dL      Albumin 3 4 g/dL      Total Bilirubin 0 30 mg/dL      eGFR 83 ml/min/1 73sq m     Narrative:      National Kidney Disease Foundation guidelines for Chronic Kidney Disease (CKD):     Stage 1 with normal or high GFR (GFR > 90 mL/min/1 73 square meters)    Stage 2 Mild CKD (GFR = 60-89 mL/min/1 73 square meters)    Stage 3A Moderate CKD (GFR = 45-59 mL/min/1 73 square meters)    Stage 3B Moderate CKD (GFR = 30-44 mL/min/1 73 square meters)    Stage 4 Severe CKD (GFR = 15-29 mL/min/1 73 square meters)    Stage 5 End Stage CKD (GFR <15 mL/min/1 73 square meters)  Note: GFR calculation is accurate only with a steady state creatinine    Troponin I [[id number]]  (Normal) Collected: 04/15/21 1704    Lab Status: Final result Specimen: Blood from Arm, Right Updated: 04/15/21 1727     Troponin I <0 02 ng/mL     CBC and differential [[id number]]  (Abnormal) Collected: 04/15/21 1704    Lab Status: Final result Specimen: Blood from Arm, Right Updated: 04/15/21 1709     WBC 13 04 Thousand/uL      RBC 5 20 Million/uL      Hemoglobin 11 4 g/dL      Hematocrit 38 3 %      MCV 74 fL      MCH 21 9 pg      MCHC 29 8 g/dL      RDW 17 2 %      MPV 11 3 fL      Platelets 259 Thousands/uL      nRBC 0 /100 WBCs      Neutrophils Relative 75 %      Immat GRANS % 1 %      Lymphocytes Relative 13 %      Monocytes Relative 8 %      Eosinophils Relative 2 %      Basophils Relative 1 %      Neutrophils Absolute 10 01 Thousands/µL      Immature Grans Absolute 0 06 Thousand/uL      Lymphocytes Absolute 1 64 Thousands/µL      Monocytes Absolute 1 00 Thousand/µL      Eosinophils Absolute 0 27 Thousand/µL      Basophils Absolute 0 06 Thousands/µL                  XR chest 1 view portable   ED Interpretation by Monroe Lesch, DO (04/15 1811)   No acute disease                 Procedures  ECG 12 Lead Documentation Only    Date/Time: 4/15/2021 5:08 PM  Performed by: Monroe Lesch, DO  Authorized by: Monroe Lesch, DO ECG reviewed by me, the ED Provider: yes    Previous ECG:     Previous ECG:  Compared to current    Comparison ECG info: Id EKG of January 11, 2021, p a  C now present    Similarity:  Changes noted  Rhythm:     Rhythm: sinus rhythm    Ectopy:     Ectopy: PAC    QRS:     QRS axis:  Normal    QRS intervals:  Normal  Conduction:     Conduction: normal    ST segments:     ST segments:  Normal  T waves:     T waves: normal               ED Course                             SBIRT 22yo+      Most Recent Value   SBIRT (24 yo +)   In order to provide better care to our patients, we are screening all of our patients for alcohol and drug use  Would it be okay to ask you these screening questions? Yes Filed at: 04/15/2021 1647   Initial Alcohol Screen: US AUDIT-C    1  How often do you have a drink containing alcohol?  0 Filed at: 04/15/2021 1647   2  How many drinks containing alcohol do you have on a typical day you are drinking? 0 Filed at: 04/15/2021 1647   3a  Male UNDER 65: How often do you have five or more drinks on one occasion? 0 Filed at: 04/15/2021 1647   3b  FEMALE Any Age, or MALE 65+: How often do you have 4 or more drinks on one occassion? 0 Filed at: 04/15/2021 1647   Audit-C Score  0 Filed at: 04/15/2021 1647   JEIMY: How many times in the past year have you    Used an illegal drug or used a prescription medication for non-medical reasons? Never Filed at: 04/15/2021 1647                    MDM  Number of Diagnoses or Management Options  Chronic respiratory failure with hypoxia ([facility] [address] ): established and worsening  Obstructive sleep apnea: established and worsening  Severe persistent asthma: established and worsening  Diagnosis management comments: Patient complaining of chronic dyspnea on exertion that is slowly worsening  Notes increased pedal edema it but is unsure if there has been weight gain  No other new symptoms    Sent here due to abnormal labs that were drawn yesterday but he tonight labs are at her baseline  Vital signs remained stable on her supplemental oxygen  Patient would prefer to be discharged  Patient does look stable for discharge  Amount and/or Complexity of Data Reviewed  Clinical lab tests: ordered and reviewed  Tests in the radiology section of CPT®: ordered and reviewed  Review and summarize past medical records: yes  Independent visualization of images, tracings, or specimens: yes        Disposition  Final diagnoses:   Chronic respiratory failure with hypoxia ([address] )   Obstructive sleep apnea   Severe persistent asthma     Time reflects when diagnosis was documented in both MDM as applicable and the Disposition within this note     Time User Action Codes Description Comment    4/15/2021  6:36 PM Lafaye Im Add [J44 1] COPD with acute exacerbation ([address] )     4/15/2021  6:36 PM Lafaye Im Remove [J44 1] COPD with acute exacerbation ([address] )     4/15/2021  6:39 PM Lafaye Im Add [J96 11] Chronic respiratory failure with hypoxia ([address] )     4/15/2021  6:39 PM Lafaye Im Add [G47 33] Obstructive sleep apnea     4/15/2021  6:39 PM Lafaye Im Add [J45 50] Severe persistent asthma       ED Disposition     ED Disposition Condition Date/Time Comment    Discharge Stable Thu Apr 15, 2021  6:36 PM [address] discharge to home/self care              Follow-up Information     Follow up With Specialties Details Why Contact Info Additional Information    Sangita Bennett DO Pulmonary Disease, Pulmonology, Intensive Care, Critical Care Medicine Call in 1 day For follow-up [address] [facility] [address] [facility]  [phone]       [facility] Cardiology Call in 1 day For follow-up [address] [address]  [address] Cardiology [address], [address] [facility]    Mica Dickerson MD Family Medicine Call in 1 day For follow-up Mehreen  [address] [facility]  [address] [facility] [address] [phone]             Discharge Medication List as of 4/15/2021  6:43 PM      CONTINUE these medications which have NOT CHANGED    Details   albuterol (PROVENTIL HFA,VENTOLIN HFA) 90 mcg/act inhaler Inhale 2 puffs every 4 (four) hours as needed for wheezing, Starting Fri 8/7/2020, Normal      apixaban (Eliquis) 5 mg Take 1 tablet (5 mg total) by mouth 2 (two) times a day, Starting Mon 3/8/2021, Normal      ascorbic acid (VITAMIN C) 1000 MG tablet Take 1,000 mg by mouth daily  , Historical Med      atorvastatin (LIPITOR) 40 mg tablet Take 1 tablet (40 mg total) by mouth daily, Starting Mon 2/8/2021, Normal      Blood Glucose Monitoring Suppl ([employer] CONTOUR NEXT MONITOR) w/Device KIT by Does not apply route daily, Starting Fri 7/14/2017, Historical Med      Cholecalciferol 1000 units tablet Take 1,000 Units by mouth daily  , Historical Med      citalopram (CeleXA) 20 mg tablet Take 1 tablet (20 mg total) by mouth daily, Starting Fri 2/5/2021, Normal      clotrimazole (LOTRIMIN) 1 % cream Apply topically 2 (two) times a day, Starting Thu 8/20/2020, Normal      cyanocobalamin (VITAMIN B-12) 100 mcg tablet Take by mouth daily, Historical Med      docusate sodium (COLACE) 100 mg capsule Take 1 capsule (100 mg total) by mouth 2 (two) times a day, Starting Mon 11/16/2020, Normal      ferrous sulfate 324 (65 Fe) mg Take 1 tablet (324 mg total) by mouth daily before breakfast, Starting Wed 1/13/2021, Until Fri 2/12/2021, Normal      flecainide (TAMBOCOR) 100 mg tablet Take 1 tablet (100 mg total) by mouth every 12 (twelve) hours, Starting Mon 3/8/2021, Normal      fluticasone (FLONASE) 50 mcg/act nasal spray 1 spray into each nostril 2 (two) times a day, Starting Wed 9/23/2020, Normal      fluticasone-umeclidinium-vilanterol (TRELEGY) 100-62 5-25 MCG/INH inhaler Inhale 1 puff daily Rinse mouth after use , Starting Fri 1/8/2021, Normal      glimepiride (AMARYL) 4 mg tablet Take 1 tablet (4 mg total) by mouth daily with breakfast, Starting Mon 3/8/2021, Normal      glucose blood (CONTOUR NEXT TEST) test strip Test blood sugar 3 times a day, Normal      glycopyrrolate-formoterol (BEVESPI AEROSPHERE) 9-4 8 MCG/ACT inhaler Inhale 2 puffs 2 (two) times a day, Starting Thu 3/11/2021, Normal      levalbuterol (XOPENEX) 1 25 mg/0 5 mL nebulizer solution Take 0 5 mL (1 25 mg total) by nebulization 2 (two) times a day, Starting Mon 11/16/2020, Normal      LIFESCAN UNISTIK II LANCETS MISC by Does not apply route 3 (three) times a day, Starting Wed 2/27/2019, Normal      loratadine (Claritin) 10 mg tablet Take by mouth, Historical Med      LORazepam (ATIVAN) 0 5 mg tablet TAKE 2 TABLETS BY MOUTH AT BEDTIME AND 1 EVERY 6 HOURS AS NEEDED FOR ANXIETY, Normal      Magnesium 400 MG CAPS Take by mouth 1 BID, Historical Med      metFORMIN (GLUCOPHAGE-XR) 500 mg 24 hr tablet Take 2 tablets (1,000 mg total) by mouth 2 (two) times a day with meals, Starting Wed 10/21/2020, Normal      metoprolol succinate (TOPROL-XL) 50 mg 24 hr tablet Take 1 tablet (50 mg total) by mouth daily, Starting Fri 2/5/2021, Normal      montelukast (SINGULAIR) 10 mg tablet Take 1 tablet (10 mg total) by mouth daily at bedtime, Starting Wed 11/4/2020, Normal      naloxone (NARCAN) 4 mg/0 1 mL nasal spray Administer 1 spray into a nostril   If breathing does not return to normal or if breathing difficulty resumes after 2-3 minutes, give another dose in the other nostril using a new spray , Normal      omeprazole (PriLOSEC) 40 MG capsule Take 1 capsule (40 mg total) by mouth daily, Starting Fri 2/5/2021, Normal      potassium chloride (K-DUR,KLOR-CON) 20 mEq tablet Take 1 tablet (20 mEq total) by mouth 2 (two) times a day, Starting Mon 3/8/2021, Normal      predniSONE 20 mg tablet Take 2 tablets (40 mg total) by mouth daily, Starting Thu 3/11/2021, Normal      torsemide (DEMADEX) 20 mg tablet Two tabs b i d , Normal      !! traZODone (DESYREL) 100 mg tablet Take 1 tablet (100 mg total) by mouth daily at bedtime, Starting Wed 1/13/2021, Normal      !! traZODone (DESYREL) 150 mg tablet Take 1 tablet (150 mg total) by mouth daily at bedtime, Starting Fri 2/5/2021, Normal       !! - Potential duplicate medications found  Please discuss with provider  No discharge procedures on file      PDMP Review       Value Time User    PDMP Reviewed  Yes 4/7/2021 12:59 PM Milan Rey MD          ED Provider  Electronically Signed by           Rajani Perdomo DO  04/15/21 7111

## 2021-04-15 NOTE — LETTER
April 15, 2021     Dominick Diaz MD  [facility]  [address] [facility]  [address] [address]    Patient: Dean Breeyz   YOB: 1960   Date of Visit: 4/15/2021       Dear Dr Guillermo Jacome: Thank you for referring Boy Coronado to me for evaluation  Below are my notes for this consultation  If you have questions, please do not hesitate to call me  I look forward to following your patient along with you           Sincerely,        Sabi Woodruff DO        CC: No Recipients  Sabi Woodruff DO  4/15/2021  2:39 PM  Sign when Signing Visit  I called the patient with results of BMP that showed kidney failure and hyperglycemia  She tells me she takes only oral medications for DM  She is not on steroids    We talked that CXR report states pulmonary edema but BNP suggests this isn't significant heart failure    I recommended that she be evaluated by ED   She would like to see her PCP - she will call them now for a sick visit    If she can't be seen today or tomorrow then I told her to go to the ED

## 2021-04-16 DIAGNOSIS — F32.1 CURRENT MODERATE EPISODE OF MAJOR DEPRESSIVE DISORDER WITHOUT PRIOR EPISODE (HCC): ICD-10-CM

## 2021-04-16 DIAGNOSIS — I50.32 CHRONIC DIASTOLIC HEART FAILURE (HCC): ICD-10-CM

## 2021-04-16 LAB
ATRIAL RATE: 71 BPM
P AXIS: 82 DEGREES
PR INTERVAL: 182 MS
QRS AXIS: 82 DEGREES
QRSD INTERVAL: 78 MS
QT INTERVAL: 402 MS
QTC INTERVAL: 436 MS
T WAVE AXIS: 76 DEGREES
VENTRICULAR RATE: 71 BPM

## 2021-04-16 PROCEDURE — 93010 ELECTROCARDIOGRAM REPORT: CPT | Performed by: INTERNAL MEDICINE

## 2021-04-16 RX ORDER — TORSEMIDE 20 MG/1
TABLET ORAL
Qty: 120 TABLET | Refills: 0 | Status: SHIPPED | OUTPATIENT
Start: 2021-04-16 | End: 2021-06-14 | Stop reason: SDUPTHER

## 2021-04-16 RX ORDER — CITALOPRAM 20 MG/1
20 TABLET ORAL DAILY
Qty: 30 TABLET | Refills: 1 | Status: SHIPPED | OUTPATIENT
Start: 2021-04-16 | End: 2021-07-14 | Stop reason: SDUPTHER

## 2021-04-26 ENCOUNTER — OFFICE VISIT (OUTPATIENT)
Dept: PULMONOLOGY | Facility: HOSPITAL | Age: 61
End: 2021-04-26
Payer: COMMERCIAL

## 2021-04-26 VITALS
TEMPERATURE: 96.9 F | SYSTOLIC BLOOD PRESSURE: 120 MMHG | HEIGHT: 63 IN | OXYGEN SATURATION: 95 % | HEART RATE: 79 BPM | WEIGHT: 289 LBS | DIASTOLIC BLOOD PRESSURE: 70 MMHG | BODY MASS INDEX: 51.21 KG/M2

## 2021-04-26 DIAGNOSIS — J44.9 ASTHMA-COPD OVERLAP SYNDROME (HCC): Primary | ICD-10-CM

## 2021-04-26 DIAGNOSIS — E66.01 CLASS 3 SEVERE OBESITY WITHOUT SERIOUS COMORBIDITY WITH BODY MASS INDEX (BMI) OF 50.0 TO 59.9 IN ADULT, UNSPECIFIED OBESITY TYPE (HCC): ICD-10-CM

## 2021-04-26 DIAGNOSIS — I27.20 PULMONARY HYPERTENSION (HCC): ICD-10-CM

## 2021-04-26 DIAGNOSIS — G47.33 OBSTRUCTIVE SLEEP APNEA: ICD-10-CM

## 2021-04-26 DIAGNOSIS — J96.11 CHRONIC RESPIRATORY FAILURE WITH HYPOXIA (HCC): ICD-10-CM

## 2021-04-26 PROBLEM — E66.813 CLASS 3 SEVERE OBESITY IN ADULT (HCC): Status: ACTIVE | Noted: 2021-04-26

## 2021-04-26 PROCEDURE — 1036F TOBACCO NON-USER: CPT | Performed by: INTERNAL MEDICINE

## 2021-04-26 PROCEDURE — 3008F BODY MASS INDEX DOCD: CPT | Performed by: INTERNAL MEDICINE

## 2021-04-26 PROCEDURE — 99215 OFFICE O/P EST HI 40 MIN: CPT | Performed by: INTERNAL MEDICINE

## 2021-04-26 RX ORDER — FLUTICASONE PROPIONATE 220 UG/1
2 AEROSOL, METERED RESPIRATORY (INHALATION) 2 TIMES DAILY
Qty: 1 INHALER | Refills: 6 | Status: SHIPPED | OUTPATIENT
Start: 2021-04-26 | End: 2022-01-31

## 2021-04-26 NOTE — PROGRESS NOTES
Pulmonary/Sleep Follow Up Note   Abril Rosenthal 56 y o  female MRN: [id number]  4/26/2021      Assessment and Plan:    1  Asthma-COPD overlap syndrome ([facility] [address] )  Assessment & Plan:  ·  I would like to add inhaled corticosteroid  · Her symptoms have been uncontrolled she is using Bevespi every day  ·  Flovent inhaler 2 puffs in the morning 2 puffs in the evening prescribed  ·  samples for Trelegy 100 provided  · She has a nebulizer but she has not been  Using it, I counseled her to use every 6 hrs as needed with Albuterol   ·  referral for pulmonary rehab    Orders:  -     Ambulatory Referral to Pulmonary Rehabilitation; Future  -     fluticasone (FLOVENT HFA) 220 mcg/act inhaler; Inhale 2 puffs 2 (two) times a day Rinse mouth after use  2  Obstructive sleep apnea  Assessment & Plan:   Auto CPAP 12-20      3  Chronic respiratory failure with hypoxia (HCC)  Assessment & Plan:   Continue supplemental oxygen for oxygen saturations greater than 90%      4  Pulmonary hypertension (HCC)  Assessment & Plan:   Group 2 and 3 in nature, scheduled to see Cardiology    continues torsemide b i d    compliant using CPAP q h s       5  Class 3 severe obesity without serious comorbidity with body mass index (BMI) of 50 0 to 59 9 in adult, unspecified obesity type [facility])  Assessment & Plan:   Counseling for lifetime dictations and trying to lose weight        Return in about 6 months (around 10/26/2021)      History of Present Illness   HPI:  Abril Rosenthal is a 56 y o  female who  Has past medical history as detailed below here  For follow-up on her asthma and COPD unfortunately her symptoms has not been controlled, she is using Bevespi every day 2 puffs in the morning 2 puffs in the evening and she is using albuterol inhaler as needed    she denies significant cough, however her dyspnea is with minimal exertion and she is adherent using her CPAP every night and wearing her oxygen 24/7    Review of Systems   All other systems reviewed and are negative        Historical Information   Past Medical History:   Diagnosis Date    Alcohol abuse 2020    Alcohol abuse 2020    Anemia     Atrial fibrillation (HCC)     Cervical radiculopathy     CHF (congestive heart failure) ([facility])     Chronic low back pain     Chronic obstructive asthma ([facility] ) 2018    Community acquired pneumonia     Community acquired pneumonia 2020    Diabetes mellitus ([facility] )     Diabetes mellitus with hyperglycemia ([facility])     Elevated liver enzymes     GERD (gastroesophageal reflux disease)     Hypersomnolence 10/28/2020    Hypokalemia 2018    Paresthesia of upper extremity     Plantar fasciitis     Restless leg     Sexual dysfunction     Sleep apnea, obstructive     Tenosynovitis of finger     Tinea corporis     Weakness of upper extremity      Past Surgical History:   Procedure Laterality Date    ABDOMINAL SURGERY      CARPAL TUNNEL RELEASE Bilateral      SECTION      CHOLECYSTECTOMY      laparoscopic    ESOPHAGOGASTRODUODENOSCOPY N/A 12/3/2018    Procedure: ESOPHAGOGASTRODUODENOSCOPY (EGD) AND COLONOSCOPY;  Surgeon: Shayan Pacheco MD;  Location: [facility];  Service: Gastroenterology    GASTRIC BYPASS      for morbid obesity with gilda en y    HERNIA REPAIR      HYSTERECTOMY      UT EXCIS PRIMARY GANGLION WRIST Left 2017    Procedure: LONG FINGER GANGLION CYST EXCISION;  Surgeon: Richard Lockwood MD;  [facility];  Service: Orthopedics    UT INCISE FINGER TENDON SHEATH Left 2017    Procedure: Marv Wolfe, RING, TRIGGER FINGER RELEASE;  Surgeon: Richard Lockwood MD;  [facility];  Service: Orthopedics    SHOULDER SURGERY       Family History   Problem Relation Age of Onset    Alzheimer's disease Mother     Atrial fibrillation Mother     Dementia Mother     Heart disease Mother         heart problem    Seizures Mother     Parkinsonism Father     Arthritis Father     Atrial fibrillation Father     Substance Abuse Neg Hx         mother,father    Mental illness Neg Hx         mother,father         Meds/Allergies     Current Outpatient Medications:     albuterol (PROVENTIL HFA,VENTOLIN HFA) 90 mcg/act inhaler, Inhale 2 puffs every 4 (four) hours as needed for wheezing, Disp: 1 Inhaler, Rfl: 4    apixaban (Eliquis) 5 mg, Take 1 tablet (5 mg total) by mouth 2 (two) times a day, Disp: 60 tablet, Rfl: 5    ascorbic acid (VITAMIN C) 1000 MG tablet, Take 1,000 mg by mouth daily  , Disp: , Rfl:     atorvastatin (LIPITOR) 40 mg tablet, Take 1 tablet (40 mg total) by mouth daily, Disp: 30 tablet, Rfl: 0    Blood Glucose Monitoring Suppl ([employer] CONTOUR NEXT MONITOR) w/Device KIT, by Does not apply route daily, Disp: , Rfl:     Cholecalciferol 1000 units tablet, Take 1,000 Units by mouth daily  , Disp: , Rfl:     citalopram (CeleXA) 20 mg tablet, Take 1 tablet (20 mg total) by mouth daily, Disp: 30 tablet, Rfl: 1    cyanocobalamin (VITAMIN B-12) 100 mcg tablet, Take by mouth daily, Disp: , Rfl:     flecainide (TAMBOCOR) 100 mg tablet, Take 1 tablet (100 mg total) by mouth every 12 (twelve) hours, Disp: 180 tablet, Rfl: 1    fluticasone (FLONASE) 50 mcg/act nasal spray, 1 spray into each nostril 2 (two) times a day, Disp: 1 Bottle, Rfl: 3    glimepiride (AMARYL) 4 mg tablet, Take 1 tablet (4 mg total) by mouth daily with breakfast, Disp: 30 tablet, Rfl: 0    glucose blood (CONTOUR NEXT TEST) test strip, Test blood sugar 3 times a day, Disp: 100 each, Rfl: 3    glycopyrrolate-formoterol (BEVESPI AEROSPHERE) 9-4 8 MCG/ACT inhaler, Inhale 2 puffs 2 (two) times a day, Disp: 10 7 g, Rfl: 5    levalbuterol (XOPENEX) 1 25 mg/0 5 mL nebulizer solution, Take 0 5 mL (1 25 mg total) by nebulization 2 (two) times a day, Disp: 60 vial, Rfl: 0    [employer] UNISTIK II LANCETS MISC, by Does not apply route 3 (three) times a day, Disp: 100 each, Rfl: 5    loratadine (Claritin) 10 mg tablet, Take by mouth, Disp: , Rfl:     LORazepam (ATIVAN) 0 5 mg tablet, TAKE 2 TABLETS BY MOUTH AT BEDTIME AND 1 EVERY 6 HOURS AS NEEDED FOR ANXIETY, Disp: 90 tablet, Rfl: 0    metFORMIN (GLUCOPHAGE-XR) 500 mg 24 hr tablet, Take 2 tablets (1,000 mg total) by mouth 2 (two) times a day with meals, Disp: 120 tablet, Rfl: 5    metoprolol succinate (TOPROL-XL) 50 mg 24 hr tablet, Take 1 tablet (50 mg total) by mouth daily, Disp: 90 tablet, Rfl: 1    montelukast (SINGULAIR) 10 mg tablet, Take 1 tablet (10 mg total) by mouth daily at bedtime, Disp: 90 tablet, Rfl: 3    naloxone (NARCAN) 4 mg/0 1 mL nasal spray, Administer 1 spray into a nostril   If breathing does not return to normal or if breathing difficulty resumes after 2-3 minutes, give another dose in the other nostril using a new spray , Disp: 1 each, Rfl: 1    omeprazole (PriLOSEC) 40 MG capsule, Take 1 capsule (40 mg total) by mouth daily, Disp: 90 capsule, Rfl: 3    potassium chloride (K-DUR,KLOR-CON) 20 mEq tablet, Take 1 tablet (20 mEq total) by mouth 2 (two) times a day, Disp: 180 tablet, Rfl: 1    torsemide (DEMADEX) 20 mg tablet, Two tabs b i d , Disp: 120 tablet, Rfl: 0    traZODone (DESYREL) 150 mg tablet, Take 1 tablet (150 mg total) by mouth daily at bedtime, Disp: 90 tablet, Rfl: 0    clotrimazole (LOTRIMIN) 1 % cream, Apply topically 2 (two) times a day (Patient not taking: Reported on 3/11/2021), Disp: 30 g, Rfl: 0    docusate sodium (COLACE) 100 mg capsule, Take 1 capsule (100 mg total) by mouth 2 (two) times a day (Patient not taking: Reported on 4/26/2021), Disp: 10 capsule, Rfl: 0    ferrous sulfate 324 (65 Fe) mg, Take 1 tablet (324 mg total) by mouth daily before breakfast (Patient not taking: Reported on 4/26/2021), Disp: 30 tablet, Rfl: 0    fluticasone (FLOVENT HFA) 220 mcg/act inhaler, Inhale 2 puffs 2 (two) times a day Rinse mouth after use , Disp: 1 Inhaler, Rfl: 6    fluticasone-umeclidinium-vilanterol (TRELEGY) 100-62 5-25 MCG/INH inhaler, Inhale 1 puff daily Rinse mouth after use  (Patient not taking: Reported on 3/11/2021), Disp: 1 Inhaler, Rfl: 3    Magnesium 400 MG CAPS, Take by mouth 1 BID, Disp: , Rfl:     predniSONE 20 mg tablet, Take 2 tablets (40 mg total) by mouth daily (Patient not taking: Reported on 4/7/2021), Disp: 10 tablet, Rfl: 0    traZODone (DESYREL) 100 mg tablet, Take 1 tablet (100 mg total) by mouth daily at bedtime (Patient not taking: Reported on 4/26/2021), Disp: 30 tablet, Rfl: 0  Allergies   Allergen Reactions    Iron Dextran Swelling    Januvia [Sitagliptin] Shortness Of Breath    Jardiance [Empagliflozin] Shortness Of Breath    Clonazepam Other (See Comments)     Unknown reaction    Codeine Itching and Other (See Comments)     itch;mary kay morphine,takes ultram @home    Adhesive [Medical Tape] Itching     itching    Effexor [Venlafaxine] Itching    Lactose - Food Allergy GI Intolerance    Oxycodone-Acetaminophen Anxiety    Oxycodone-Acetaminophen Itching and Rash     Other reaction(s): Other (See Comments)  (PERCOCET) MILD RASH, not allergic to Acetaminophen        Vitals: Blood pressure 120/70, pulse 79, temperature (!) 96 9 °F (36 1 °C), temperature source Tympanic, height 5' 3" (1 6 m), weight 131 kg (289 lb), SpO2 95 %, not currently breastfeeding  Body mass index is 51 19 kg/m²  Oxygen Therapy  SpO2: 95 %  Oxygen Therapy: Supplemental oxygen  O2 Delivery Method: Nasal cannula  O2 Flow Rate (L/min): 3 L/min      Physical Exam  Physical Exam  Constitutional:       General: She is not in acute distress  Appearance: Normal appearance  She is obese  She is not ill-appearing  HENT:      Head: Normocephalic and atraumatic  Nose: No congestion or rhinorrhea  Mouth/Throat:      Mouth: Mucous membranes are moist       Pharynx: Oropharynx is clear  No oropharyngeal exudate or posterior oropharyngeal erythema  Eyes:      General: No scleral icterus  Right eye: No discharge  Left eye: No discharge  Extraocular Movements: Extraocular movements intact  Neck:      Musculoskeletal: Normal range of motion and neck supple  No neck rigidity  Cardiovascular:      Rate and Rhythm: Normal rate and regular rhythm  Pulses: Normal pulses  Heart sounds: Normal heart sounds  No murmur  No gallop  Pulmonary:      Effort: Pulmonary effort is normal  No respiratory distress  Breath sounds: Normal breath sounds  No wheezing, rhonchi or rales  Comments: Diminished air entry bilaterally  Abdominal:      General: Abdomen is flat  Bowel sounds are normal  There is no distension  Palpations: Abdomen is soft  Tenderness: There is no abdominal tenderness  Musculoskeletal:         General: No swelling, tenderness or deformity  Right lower leg: No edema  Left lower leg: No edema  Skin:     General: Skin is warm and dry  Coloration: Skin is not pale  Findings: No erythema  Neurological:      General: No focal deficit present  Mental Status: She is alert and oriented to person, place, and time  Mental status is at baseline  Psychiatric:         Mood and Affect: Mood normal          Behavior: Behavior normal          Thought Content: Thought content normal          Judgment: Judgment normal          Labs: I have personally reviewed pertinent lab results  , ABG: No results found for: PHART, QEO4KTH, PO2ART, IHS8VQI, Y9TDDVNX, BEART, SOURCE, BNP: No results found for: BNP, CBC: No results found for: WBC, HGB, HCT, MCV, PLT, ADJUSTEDWBC, MCH, MCHC, RDW, MPV, NRBC, CMP: No results found for: SODIUM, K, CL, CO2, ANIONGAP, BUN, CREATININE, GLUCOSE, CALCIUM, AST, ALT, ALKPHOS, PROT, BILITOT, EGFR, PT/INR: No results found for: PT, INR, Troponin: No results found for: TROPONINI  Lab Results   Component Value Date    WBC 13 04 (H) 04/15/2021    HGB 11 4 (L) 04/15/2021    HCT 38 3 04/15/2021    MCV 74 (L) 04/15/2021     04/15/2021     Lab Results   Component Value Date GLUCOSE 131 (H) 09/22/2017    CALCIUM 8 8 04/15/2021     09/22/2017    K 4 2 04/15/2021    CO2 31 04/15/2021    CL 96 (L) 04/15/2021    BUN 12 04/15/2021    CREATININE 0 77 04/15/2021     No results found for: IGE  Lab Results   Component Value Date    ALT 58 04/15/2021    AST 30 04/15/2021    ALKPHOS 190 (H) 04/15/2021    BILITOT 0 4 09/22/2017         Imaging and other studies: I have personally reviewed pertinent films in PACS  CXR 4/2021  Prominent pulmonary artery contour suspicious for pulmonary hypertension  No pneumothorax        Pulmonary function testing:   FEV1/FVC Ratio: 76 %  Forced Vital Capacity: 1 49 L    47 % predicted  FEV1: 0 89 L     36 % predicted  Post bronchodilator response: Bronchodilator was not administered     Lung volumes by body plethysmography:   Total Lung Capacity 107 % predicted   Residual volume 183 % predicted     DLCO corrected for patients hemoglobin level: 72 %       EKG, Pathology, and Other Studies: I have personally reviewed pertinent reports  TTE 11/2020  LEFT VENTRICLE: Size was normal  Systolic function was normal by visual assessment  Ejection fraction was estimated to be 55 %  There were no regional wall motion abnormalities  Wall thickness was normal  DOPPLER: The ratio of early  ventricular filling to atrial contraction velocities was within the normal range  Features were consistent with a pseudonormal left ventricular filling pattern, with concomitant abnormal relaxation and increased filling pressure (grade 2  diastolic dysfunction)      RIGHT VENTRICLE: The size was normal  Systolic function was normal  DOPPLER: Systolic pressure was moderately to markedly increased  Estimated peak pressure was 55 mmHg      LEFT ATRIUM: Size was normal      RIGHT ATRIUM: Size was normal      MITRAL VALVE: Valve structure was normal  There was mild calcification  There was normal leaflet separation  DOPPLER: The transmitral velocity was within the normal range   There was no evidence for stenosis  There was mild regurgitation      AORTIC VALVE: The valve was trileaflet  Leaflets exhibited normal thickness and normal cuspal separation  DOPPLER: Transaortic velocity was within the normal range  There was no evidence for stenosis  There was mild regurgitation      TRICUSPID VALVE: The valve structure was normal  There was normal leaflet separation  DOPPLER: The transtricuspid velocity was within the normal range  There was no evidence for stenosis  There was moderate regurgitation      PULMONIC VALVE: Leaflets exhibited normal thickness, no calcification, and normal cuspal separation  DOPPLER: The transpulmonic velocity was within the normal range  There was no regurgitation      PERICARDIUM: There was no pericardial effusion            Freida Ramey MD  [facility]       Portions of the record may have been created with voice recognition software  Occasional wrong word or "sound a like" substitutions may have occurred due to the inherent limitations of voice recognition software  Read the chart carefully and recognize, using context, where substitutions have occurred

## 2021-04-26 NOTE — PATIENT INSTRUCTIONS
COPD, Ambulatory Care   GENERAL INFORMATION:   COPD (chronic obstructive pulmonary disease)  is a lung disease that makes it hard for you to breathe  COPD is usually a result of lung damage caused by years of irritation and inflammation  COPD limits air flow in your lungs  Smoking, pollution, genetics, or a history of lung infections can increase your risk for COPD  Common symptoms include the following:   · Shortness of breath     · A dry cough     · Coughing fits that bring up mucus from your lungs     · Wheezing and chest tightness  Seek immediate care for the following symptoms:   · Confusion, dizziness, or lightheadedness    · Red, swollen, warm arm or leg    · Shortness of breath or chest pain    · Coughing up blood  Treatment for COPD  may include medicines to help decrease swelling and inflammation in your lungs  Medicines may also help open your airways or treat and infection  You may need pulmonary rehabilitation to help you manage your symptoms and improve your quality of life  You may need extra oxygen to help you breathe easier  Manage COPD and prevent an exacerbation:   · Do not smoke, and avoid others who smoke  If you smoke, it is never too late to quit  You may have fewer exacerbations  Ask for information about medicines and support programs that can help you quit  · Avoid triggers that make your symptoms worse  Cold weather and sudden temperature changes can trigger an exacerbation  Fumes from cars and chemicals, air pollution, and perfume can also increase your symptoms  · Use pursed-lip breathing when you feel short of breath  Take a deep breath in through your nose  Slowly breathe out through your mouth with your lips pursed for twice as long as you inhaled  You can also practice this breathing pattern while you bend, lift, climb stairs, or exercise  Pursed-lip breathing slows down your breathing and helps move more air in and out of your lungs             · Exercise for at least 20 minutes each day  Exercise can help increase your energy and decrease shortness of breath  Ask about the best exercise plan for you  · Prevent infections that can be dangerous when you have COPD  Get a flu vaccine every year as soon as it becomes available  Ask if you should also get other vaccines, such as those given to prevent pneumonia and tetanus  Avoid people who are sick, and wash your hands often  Follow up with your healthcare provider as directed:  Write down your questions so you remember to ask them during your visits  CARE AGREEMENT:   You have the right to help plan your care  Learn about your health condition and how it may be treated  Discuss treatment options with your caregivers to decide what care you want to receive  You always have the right to refuse treatment  The above information is an  only  It is not intended as medical advice for individual conditions or treatments  Talk to your doctor, nurse or pharmacist before following any medical regimen to see if it is safe and effective for you  © 2014 [address] is for End User's use only and may not be sold, redistributed or otherwise used for commercial purposes  All illustrations and images included in CareNotes® are the copyrighted property of [employer]  or [employer]un[employer]

## 2021-04-26 NOTE — ASSESSMENT & PLAN NOTE
Group 2 and 3 in nature, scheduled to see Cardiology    continues torsemide b i d    compliant using CPAP q h s

## 2021-04-26 NOTE — ASSESSMENT & PLAN NOTE
·  I would like to add inhaled corticosteroid  · Her symptoms have been uncontrolled she is using Bevespi every day  ·  Flovent inhaler 2 puffs in the morning 2 puffs in the evening prescribed  ·  samples for Trelegy 100 provided  · She has a nebulizer but she has not been  Using it, I counseled her to use every 6 hrs as needed with Albuterol   ·  referral for pulmonary rehab

## 2021-04-30 ENCOUNTER — TELEPHONE (OUTPATIENT)
Dept: CARDIAC REHAB | Facility: HOSPITAL | Age: 61
End: 2021-04-30

## 2021-05-02 ENCOUNTER — APPOINTMENT (EMERGENCY)
Dept: CT IMAGING | Facility: HOSPITAL | Age: 61
DRG: 244 | End: 2021-05-02
Payer: COMMERCIAL

## 2021-05-02 ENCOUNTER — HOSPITAL ENCOUNTER (INPATIENT)
Facility: HOSPITAL | Age: 61
LOS: 3 days | Discharge: HOME | DRG: 244 | End: 2021-05-05
Attending: EMERGENCY MEDICINE | Admitting: FAMILY MEDICINE
Payer: COMMERCIAL

## 2021-05-02 DIAGNOSIS — A41.9 SEVERE SEPSIS (HCC): ICD-10-CM

## 2021-05-02 DIAGNOSIS — K57.92 ACUTE DIVERTICULITIS: ICD-10-CM

## 2021-05-02 DIAGNOSIS — K31.7 GASTRIC POLYP: ICD-10-CM

## 2021-05-02 DIAGNOSIS — K57.92 DIVERTICULITIS: Primary | ICD-10-CM

## 2021-05-02 DIAGNOSIS — R65.20 SEVERE SEPSIS (HCC): ICD-10-CM

## 2021-05-02 LAB
ALBUMIN SERPL BCP-MCNC: 3.3 G/DL (ref 3.5–5)
ALP SERPL-CCNC: 175 U/L (ref 46–116)
ALT SERPL W P-5'-P-CCNC: 31 U/L (ref 12–78)
ANION GAP SERPL CALCULATED.3IONS-SCNC: 8 MMOL/L (ref 4–13)
AST SERPL W P-5'-P-CCNC: 20 U/L (ref 5–45)
BASOPHILS # BLD AUTO: 0.05 THOUSANDS/ΜL (ref 0–0.1)
BASOPHILS NFR BLD AUTO: 0 % (ref 0–1)
BILIRUB SERPL-MCNC: 0.4 MG/DL (ref 0.2–1)
BILIRUB UR QL STRIP: NEGATIVE
BUN SERPL-MCNC: 9 MG/DL (ref 5–25)
CALCIUM ALBUM COR SERPL-MCNC: 8.9 MG/DL (ref 8.3–10.1)
CALCIUM SERPL-MCNC: 8.3 MG/DL (ref 8.3–10.1)
CHLORIDE SERPL-SCNC: 95 MMOL/L (ref 100–108)
CLARITY UR: CLEAR
CO2 SERPL-SCNC: 33 MMOL/L (ref 21–32)
COLOR UR: YELLOW
CREAT SERPL-MCNC: 0.82 MG/DL (ref 0.6–1.3)
EOSINOPHIL # BLD AUTO: 0.14 THOUSAND/ΜL (ref 0–0.61)
EOSINOPHIL NFR BLD AUTO: 1 % (ref 0–6)
ERYTHROCYTE [DISTWIDTH] IN BLOOD BY AUTOMATED COUNT: 16.9 % (ref 11.6–15.1)
GFR SERPL CREATININE-BSD FRML MDRD: 77 ML/MIN/1.73SQ M
GLUCOSE SERPL-MCNC: 201 MG/DL (ref 65–140)
GLUCOSE SERPL-MCNC: 312 MG/DL (ref 65–140)
GLUCOSE UR STRIP-MCNC: ABNORMAL MG/DL
HCT VFR BLD AUTO: 36.3 % (ref 34.8–46.1)
HGB BLD-MCNC: 10.9 G/DL (ref 11.5–15.4)
HGB UR QL STRIP.AUTO: NEGATIVE
IMM GRANULOCYTES # BLD AUTO: 0.08 THOUSAND/UL (ref 0–0.2)
IMM GRANULOCYTES NFR BLD AUTO: 1 % (ref 0–2)
KETONES UR STRIP-MCNC: NEGATIVE MG/DL
LACTATE SERPL-SCNC: 2.3 MMOL/L (ref 0.5–2)
LACTATE SERPL-SCNC: 2.4 MMOL/L (ref 0.5–2)
LACTATE SERPL-SCNC: 2.7 MMOL/L (ref 0.5–2)
LACTATE SERPL-SCNC: 3 MMOL/L (ref 0.5–2)
LEUKOCYTE ESTERASE UR QL STRIP: NEGATIVE
LYMPHOCYTES # BLD AUTO: 1.13 THOUSANDS/ΜL (ref 0.6–4.47)
LYMPHOCYTES NFR BLD AUTO: 7 % (ref 14–44)
MCH RBC QN AUTO: 21.5 PG (ref 26.8–34.3)
MCHC RBC AUTO-ENTMCNC: 30 G/DL (ref 31.4–37.4)
MCV RBC AUTO: 72 FL (ref 82–98)
MONOCYTES # BLD AUTO: 1 THOUSAND/ΜL (ref 0.17–1.22)
MONOCYTES NFR BLD AUTO: 6 % (ref 4–12)
NEUTROPHILS # BLD AUTO: 13.79 THOUSANDS/ΜL (ref 1.85–7.62)
NEUTS SEG NFR BLD AUTO: 85 % (ref 43–75)
NITRITE UR QL STRIP: NEGATIVE
NRBC BLD AUTO-RTO: 0 /100 WBCS
PH UR STRIP.AUTO: 6 [PH]
PLATELET # BLD AUTO: 316 THOUSANDS/UL (ref 149–390)
PMV BLD AUTO: 10.8 FL (ref 8.9–12.7)
POTASSIUM SERPL-SCNC: 4.2 MMOL/L (ref 3.5–5.3)
PROT SERPL-MCNC: 7 G/DL (ref 6.4–8.2)
PROT UR STRIP-MCNC: NEGATIVE MG/DL
RBC # BLD AUTO: 5.06 MILLION/UL (ref 3.81–5.12)
SODIUM SERPL-SCNC: 136 MMOL/L (ref 136–145)
SP GR UR STRIP.AUTO: <=1.005 (ref 1–1.03)
UROBILINOGEN UR QL STRIP.AUTO: 0.2 E.U./DL
WBC # BLD AUTO: 16.19 THOUSAND/UL (ref 4.31–10.16)

## 2021-05-02 PROCEDURE — 94664 DEMO&/EVAL PT USE INHALER: CPT

## 2021-05-02 PROCEDURE — G1004 CDSM NDSC: HCPCS

## 2021-05-02 PROCEDURE — 82948 REAGENT STRIP/BLOOD GLUCOSE: CPT

## 2021-05-02 PROCEDURE — 96360 HYDRATION IV INFUSION INIT: CPT

## 2021-05-02 PROCEDURE — 36415 COLL VENOUS BLD VENIPUNCTURE: CPT | Performed by: EMERGENCY MEDICINE

## 2021-05-02 PROCEDURE — 94002 VENT MGMT INPAT INIT DAY: CPT

## 2021-05-02 PROCEDURE — 99223 1ST HOSP IP/OBS HIGH 75: CPT | Performed by: FAMILY MEDICINE

## 2021-05-02 PROCEDURE — 94760 N-INVAS EAR/PLS OXIMETRY 1: CPT

## 2021-05-02 PROCEDURE — 94640 AIRWAY INHALATION TREATMENT: CPT

## 2021-05-02 PROCEDURE — 99285 EMERGENCY DEPT VISIT HI MDM: CPT | Performed by: EMERGENCY MEDICINE

## 2021-05-02 PROCEDURE — 80053 COMPREHEN METABOLIC PANEL: CPT | Performed by: EMERGENCY MEDICINE

## 2021-05-02 PROCEDURE — 87040 BLOOD CULTURE FOR BACTERIA: CPT | Performed by: EMERGENCY MEDICINE

## 2021-05-02 PROCEDURE — 85025 COMPLETE CBC W/AUTO DIFF WBC: CPT | Performed by: EMERGENCY MEDICINE

## 2021-05-02 PROCEDURE — 83605 ASSAY OF LACTIC ACID: CPT | Performed by: EMERGENCY MEDICINE

## 2021-05-02 PROCEDURE — 81003 URINALYSIS AUTO W/O SCOPE: CPT | Performed by: FAMILY MEDICINE

## 2021-05-02 PROCEDURE — 74177 CT ABD & PELVIS W/CONTRAST: CPT

## 2021-05-02 PROCEDURE — 99285 EMERGENCY DEPT VISIT HI MDM: CPT

## 2021-05-02 PROCEDURE — 83605 ASSAY OF LACTIC ACID: CPT | Performed by: FAMILY MEDICINE

## 2021-05-02 RX ORDER — CITALOPRAM 20 MG/1
20 TABLET ORAL DAILY
Status: DISCONTINUED | OUTPATIENT
Start: 2021-05-03 | End: 2021-05-05 | Stop reason: HOSPADM

## 2021-05-02 RX ORDER — ACETAMINOPHEN 325 MG/1
650 TABLET ORAL EVERY 6 HOURS PRN
Status: DISCONTINUED | OUTPATIENT
Start: 2021-05-02 | End: 2021-05-05 | Stop reason: HOSPADM

## 2021-05-02 RX ORDER — INSULIN GLARGINE 100 [IU]/ML
10 INJECTION, SOLUTION SUBCUTANEOUS
Status: DISCONTINUED | OUTPATIENT
Start: 2021-05-02 | End: 2021-05-05 | Stop reason: HOSPADM

## 2021-05-02 RX ORDER — MAGNESIUM HYDROXIDE/ALUMINUM HYDROXICE/SIMETHICONE 120; 1200; 1200 MG/30ML; MG/30ML; MG/30ML
30 SUSPENSION ORAL EVERY 6 HOURS PRN
Status: DISCONTINUED | OUTPATIENT
Start: 2021-05-02 | End: 2021-05-05 | Stop reason: HOSPADM

## 2021-05-02 RX ORDER — MONTELUKAST SODIUM 10 MG/1
10 TABLET ORAL
Status: DISCONTINUED | OUTPATIENT
Start: 2021-05-02 | End: 2021-05-05 | Stop reason: HOSPADM

## 2021-05-02 RX ORDER — POTASSIUM CHLORIDE 20 MEQ/1
20 TABLET, EXTENDED RELEASE ORAL 2 TIMES DAILY
Status: DISCONTINUED | OUTPATIENT
Start: 2021-05-02 | End: 2021-05-05 | Stop reason: HOSPADM

## 2021-05-02 RX ORDER — LORATADINE 10 MG/1
10 TABLET ORAL DAILY
Status: DISCONTINUED | OUTPATIENT
Start: 2021-05-03 | End: 2021-05-05 | Stop reason: HOSPADM

## 2021-05-02 RX ORDER — FLECAINIDE ACETATE 100 MG/1
100 TABLET ORAL EVERY 12 HOURS SCHEDULED
Status: DISCONTINUED | OUTPATIENT
Start: 2021-05-02 | End: 2021-05-05 | Stop reason: HOSPADM

## 2021-05-02 RX ORDER — MELATONIN
1000 DAILY
Status: DISCONTINUED | OUTPATIENT
Start: 2021-05-03 | End: 2021-05-05 | Stop reason: HOSPADM

## 2021-05-02 RX ORDER — ALBUTEROL SULFATE 90 UG/1
2 AEROSOL, METERED RESPIRATORY (INHALATION) EVERY 4 HOURS PRN
Status: DISCONTINUED | OUTPATIENT
Start: 2021-05-02 | End: 2021-05-05 | Stop reason: HOSPADM

## 2021-05-02 RX ORDER — TRAZODONE HYDROCHLORIDE 50 MG/1
150 TABLET ORAL
Status: DISCONTINUED | OUTPATIENT
Start: 2021-05-02 | End: 2021-05-05 | Stop reason: HOSPADM

## 2021-05-02 RX ORDER — LEVALBUTEROL 1.25 MG/.5ML
1.25 SOLUTION, CONCENTRATE RESPIRATORY (INHALATION)
Status: DISCONTINUED | OUTPATIENT
Start: 2021-05-02 | End: 2021-05-05 | Stop reason: HOSPADM

## 2021-05-02 RX ORDER — ASCORBIC ACID 500 MG
1000 TABLET ORAL DAILY
Status: DISCONTINUED | OUTPATIENT
Start: 2021-05-03 | End: 2021-05-05 | Stop reason: HOSPADM

## 2021-05-02 RX ORDER — METOPROLOL SUCCINATE 50 MG/1
50 TABLET, EXTENDED RELEASE ORAL DAILY
Status: DISCONTINUED | OUTPATIENT
Start: 2021-05-03 | End: 2021-05-05 | Stop reason: HOSPADM

## 2021-05-02 RX ORDER — TORSEMIDE 20 MG/1
20 TABLET ORAL
Status: DISCONTINUED | OUTPATIENT
Start: 2021-05-04 | End: 2021-05-02

## 2021-05-02 RX ORDER — ATORVASTATIN CALCIUM 40 MG/1
40 TABLET, FILM COATED ORAL
Status: DISCONTINUED | OUTPATIENT
Start: 2021-05-02 | End: 2021-05-05 | Stop reason: HOSPADM

## 2021-05-02 RX ORDER — FLUTICASONE PROPIONATE 220 UG/1
2 AEROSOL, METERED RESPIRATORY (INHALATION) 2 TIMES DAILY
Status: DISCONTINUED | OUTPATIENT
Start: 2021-05-02 | End: 2021-05-05 | Stop reason: HOSPADM

## 2021-05-02 RX ORDER — SIMETHICONE 80 MG
80 TABLET,CHEWABLE ORAL 4 TIMES DAILY PRN
Status: DISCONTINUED | OUTPATIENT
Start: 2021-05-02 | End: 2021-05-05 | Stop reason: HOSPADM

## 2021-05-02 RX ORDER — FLUTICASONE PROPIONATE 50 MCG
1 SPRAY, SUSPENSION (ML) NASAL 2 TIMES DAILY
Status: DISCONTINUED | OUTPATIENT
Start: 2021-05-02 | End: 2021-05-05 | Stop reason: HOSPADM

## 2021-05-02 RX ORDER — TRAMADOL HYDROCHLORIDE 50 MG/1
50 TABLET ORAL EVERY 6 HOURS PRN
Status: DISCONTINUED | OUTPATIENT
Start: 2021-05-02 | End: 2021-05-05 | Stop reason: HOSPADM

## 2021-05-02 RX ORDER — ACETAMINOPHEN 325 MG/1
975 TABLET ORAL ONCE
Status: COMPLETED | OUTPATIENT
Start: 2021-05-02 | End: 2021-05-02

## 2021-05-02 RX ORDER — LEVALBUTEROL 1.25 MG/.5ML
1.25 SOLUTION, CONCENTRATE RESPIRATORY (INHALATION) EVERY 8 HOURS PRN
Status: DISCONTINUED | OUTPATIENT
Start: 2021-05-02 | End: 2021-05-05 | Stop reason: HOSPADM

## 2021-05-02 RX ORDER — LORAZEPAM 0.5 MG/1
0.5 TABLET ORAL
Status: DISCONTINUED | OUTPATIENT
Start: 2021-05-02 | End: 2021-05-05 | Stop reason: HOSPADM

## 2021-05-02 RX ORDER — PANTOPRAZOLE SODIUM 40 MG/1
40 TABLET, DELAYED RELEASE ORAL
Status: DISCONTINUED | OUTPATIENT
Start: 2021-05-03 | End: 2021-05-05 | Stop reason: HOSPADM

## 2021-05-02 RX ORDER — TORSEMIDE 20 MG/1
20 TABLET ORAL
Status: DISCONTINUED | OUTPATIENT
Start: 2021-05-03 | End: 2021-05-02

## 2021-05-02 RX ADMIN — SODIUM CHLORIDE 500 ML: 0.9 INJECTION, SOLUTION INTRAVENOUS at 16:00

## 2021-05-02 RX ADMIN — APIXABAN 5 MG: 5 TABLET, FILM COATED ORAL at 20:28

## 2021-05-02 RX ADMIN — TRAMADOL HYDROCHLORIDE 50 MG: 50 TABLET, FILM COATED ORAL at 22:27

## 2021-05-02 RX ADMIN — TRAZODONE HYDROCHLORIDE 150 MG: 50 TABLET ORAL at 22:27

## 2021-05-02 RX ADMIN — FLUTICASONE PROPIONATE 2 PUFF: 220 AEROSOL, METERED RESPIRATORY (INHALATION) at 20:37

## 2021-05-02 RX ADMIN — INSULIN LISPRO 2 UNITS: 100 INJECTION, SOLUTION INTRAVENOUS; SUBCUTANEOUS at 21:51

## 2021-05-02 RX ADMIN — FLECAINIDE ACETATE 100 MG: 100 TABLET ORAL at 20:27

## 2021-05-02 RX ADMIN — FLUTICASONE PROPIONATE 1 SPRAY: 50 SPRAY, METERED NASAL at 21:58

## 2021-05-02 RX ADMIN — SODIUM CHLORIDE 500 ML: 0.9 INJECTION, SOLUTION INTRAVENOUS at 23:19

## 2021-05-02 RX ADMIN — LEVALBUTEROL HYDROCHLORIDE 1.25 MG: 1.25 SOLUTION, CONCENTRATE RESPIRATORY (INHALATION) at 20:17

## 2021-05-02 RX ADMIN — ATORVASTATIN CALCIUM 40 MG: 40 TABLET, FILM COATED ORAL at 20:28

## 2021-05-02 RX ADMIN — POTASSIUM CHLORIDE 20 MEQ: 1500 TABLET, EXTENDED RELEASE ORAL at 20:28

## 2021-05-02 RX ADMIN — ACETAMINOPHEN 650 MG: 325 TABLET, FILM COATED ORAL at 20:28

## 2021-05-02 RX ADMIN — PIPERACILLIN AND TAZOBACTAM 3.38 G: 3; .375 INJECTION, POWDER, LYOPHILIZED, FOR SOLUTION INTRAVENOUS at 21:51

## 2021-05-02 RX ADMIN — SODIUM CHLORIDE 500 ML: 0.9 INJECTION, SOLUTION INTRAVENOUS at 14:27

## 2021-05-02 RX ADMIN — MONTELUKAST SODIUM 10 MG: 10 TABLET, FILM COATED ORAL at 21:50

## 2021-05-02 RX ADMIN — PIPERACILLIN AND TAZOBACTAM 3.38 G: 3; .375 INJECTION, POWDER, LYOPHILIZED, FOR SOLUTION INTRAVENOUS; PARENTERAL at 16:30

## 2021-05-02 RX ADMIN — IOHEXOL 100 ML: 350 INJECTION, SOLUTION INTRAVENOUS at 15:30

## 2021-05-02 RX ADMIN — LORAZEPAM 0.5 MG: 0.5 TABLET ORAL at 21:50

## 2021-05-02 RX ADMIN — ACETAMINOPHEN 975 MG: 325 TABLET, FILM COATED ORAL at 14:26

## 2021-05-02 RX ADMIN — INSULIN GLARGINE 10 UNITS: 100 INJECTION, SOLUTION SUBCUTANEOUS at 21:50

## 2021-05-02 RX ADMIN — SODIUM CHLORIDE 500 ML: 0.9 INJECTION, SOLUTION INTRAVENOUS at 20:23

## 2021-05-02 NOTE — H&P
[facility] [facility] 1960, 56 y o  female MRN: [id number]  Unit/Bed#: -01 Encounter: [id number]  Primary Care Provider: Aleksandra Rogers MD   Date and time admitted to hospital: 5/2/2021  1:59 PM    * Acute diverticulitis  Assessment & Plan  · Patient came to the hospital with abdominal pain for 2 days  CT scan was consistent with acute diverticulitis of the sigmoid  · Started on Zosyn which we will continue  · Clear liquid diet for now  · Once the pain improves we can advance the diet  · GI evaluateion  · Patient reported that she had colonoscopy in the past and had polyps removed  Patient was supposed to have a repeat colonoscopy but unfortunately patient had a colonoscopy for 2 years  · GI eval    Gastric polyp  Assessment & Plan  · Noted to have gastric polyp on CT  · Consult GI  · May need eventual EGD     Class 3 severe obesity in [facility])  Assessment & Plan  · TLC as outpatient    Sepsis ([facility] [address] )  Assessment & Plan  · Patient met the criteria for sepsis at the time of presentation with fever and leukocytosis with lactic acidosis  · The likely sources diverticulitis  · IV antibiotics  · Trend lactic acid to ensure resolution    Lactic acidosis  Assessment & Plan  · Trend lactic acidosis to ensure resolution    Atrial fibrillation (HCC)  Assessment & Plan  · Heart rate controlled  · Continue with Eliquis    Chronic respiratory failure with hypoxia ([facility] [place] [address] )  Assessment & Plan  · Continue with the supplemental oxygen  · Patient with asthma with COPD  · Continue with the respiratory protocol  · Patient uses CPAP at night    Type 2 diabetes mellitus, without long-term current use of insulin ([facility] [place] [address] )  Assessment & Plan  Lab Results   Component Value Date    HGBA1C 8 4 (A) 10/28/2020       No results for input(s): POCGLU in the last 72 hours  Blood Sugar Average: Last 72 hrs:   patient is on Amaryl and also metformin as an outpatient    Last hemoglobin A1c of 8 4  Start on Lantus at low-dose  Insulin sliding scale coverage  Patient is going to be on diabetic clear liquid    Hypercholesterolemia  Assessment & Plan  · Continue statin    Obstructive sleep apnea  Assessment & Plan  · Continue CPAP q h s  Benign essential hypertension  Assessment & Plan  · Monitor blood pressure  · Continue with outpatient medication      VTE Prophylaxis: Apixaban (Eliquis)  / sequential compression device   Code Status: full code  POLST: POLST form is not discussed and not completed at this time  Discussion with family:     Anticipated Length of Stay:  Patient will be admitted on an Inpatient basis with an anticipated length of stay of  > 2 midnights  Justification for Hospital Stay:  Diverticulitis    Total Time for Visit, including Counseling / Coordination of Care: 70 minutes  Greater than 50% of this total time spent on direct patient counseling and coordination of care  Chief Complaint:   Abdominal pain    History of Present Illness:    Toya West is a 56 y o  female who presents with abdominal pain for the past 2 days  Patient reported that she had chills last night and right now she has sweating  Patient also gives history of loose stools  No bloody bowel movement  No nausea or any vomiting  Patient presented to the hospital and had a CT scan which was consistent with diverticulitis  Patient reported that she had colonoscopy approximately 2 years ago and was supposed to have another colonoscopy in about 8 months or so after polyp removal   Patient reported that she has not had a repeat colonoscopy yet  Patient with obstructive sleep apnea and uses CPAP at night  Patient also with the    Review of Systems:    Review of Systems   Constitutional: Positive for appetite change, chills, diaphoresis, fatigue and fever  Negative for activity change  HENT: Negative  Eyes: Negative  Respiratory: Negative  Cardiovascular: Negative      Gastrointestinal: Positive for abdominal pain and diarrhea  Endocrine: Negative  Genitourinary: Negative  Musculoskeletal: Negative  Skin: Negative  Allergic/Immunologic: Negative  Neurological: Negative  Hematological: Negative  Psychiatric/Behavioral: Negative          Past Medical and Surgical History:     Past Medical History:   Diagnosis Date    Alcohol abuse 2020    Alcohol abuse 2020    Anemia     Atrial fibrillation (HCC)     Cervical radiculopathy     [facility]est[facility] failure[facility]     Chronic low back pain     Chronic obstructive asthma ([facility] ) 2018    Community acquired pneumonia     Community acquired pneumonia 2020    Diabetes mellitus ([place] [address] )     Diabetes mellitus with hyperglycemia (HCC)     Elevated liver enzymes     GERD (gastroesophageal reflux disease)     Hypersomnolence 10/28/2020    Hypokalemia 2018    Paresthesia of upper extremity     Plantar fasciitis     Restless leg     Sexual dysfunction     Sleep apnea, obstructive     Tenosynovitis of finger     Tinea corporis     Weakness of upper extremity        Past Surgical History:   Procedure Laterality Date    ABDOMINAL SURGERY      CARPAL TUNNEL RELEASE Bilateral      SECTION      CHOLECYSTECTOMY      laparoscopic    ESOPHAGOGASTRODUODENOSCOPY N/A 12/3/2018    Procedure: ESOPHAGOGASTRODUODENOSCOPY (EGD) AND COLONOSCOPY;  Surgeon: Shivam Najera MD;  Location: [facility];  Service: Gastroenterology    GASTRIC BYPASS      for morbid obesity with gilda en y   [address] PRIMARY GANGLION WRIST Left 2017    Procedure: LONG FINGER GANGLION CYST EXCISION;  Surgeon: Jearldine Goodell, MD;  [facility];  Service: Orthopedics    VT INCISE FINGER TENDON SHEATH Left 2017    Procedure: THUMB, LONG, RING, TRIGGER FINGER RELEASE;  Surgeon: Jearldine Goodell, MD;  [facility];  Service: Orthopedics    SHOULDER SURGERY Meds/Allergies:    Prior to Admission medications    Medication Sig Start Date End Date Taking?  Authorizing Provider   albuterol (PROVENTIL HFA,VENTOLIN HFA) 90 mcg/act inhaler Inhale 2 puffs every 4 (four) hours as needed for wheezing 8/7/20   Oksana Goss PA-C   apixaban (Eliquis) 5 mg Take 1 tablet (5 mg total) by mouth 2 (two) times a day 3/8/21   Sylwia Peterson MD   ascorbic acid (VITAMIN C) 1000 MG tablet Take 1,000 mg by mouth daily      Historical Provider, MD   atorvastatin (LIPITOR) 40 mg tablet Take 1 tablet (40 mg total) by mouth daily 2/8/21   VICKY Esquivel   Blood Glucose Monitoring Suppl ([employer] CONTOUR NEXT MONITOR) w/Device KIT by Does not apply route daily 7/14/17   Historical Provider, MD   Cholecalciferol 1000 units tablet Take 1,000 Units by mouth daily      Historical Provider, MD   citalopram (CeleXA) 20 mg tablet Take 1 tablet (20 mg total) by mouth daily 4/16/21   Laith Olivares MD   cyanocobalamin (VITAMIN B-12) 100 mcg tablet Take by mouth daily    Historical Provider, MD   ferrous sulfate 324 (65 Fe) mg Take 1 tablet (324 mg total) by mouth daily before breakfast  Patient not taking: Reported on 4/26/2021 1/13/21 2/12/21  Sylwia Peterson MD   flecainide (TAMBOCOR) 100 mg tablet Take 1 tablet (100 mg total) by mouth every 12 (twelve) hours 3/8/21   Laith Olivares MD   fluticasone (FLONASE) 50 mcg/act nasal spray 1 spray into each nostril 2 (two) times a day 9/23/20   Brian Lawson MD   fluticasone (FLOVENT HFA) 220 mcg/act inhaler Inhale 2 puffs 2 (two) times a day Rinse mouth after use  4/26/21   Brian Lawson MD   glimepiride (AMARYL) 4 mg tablet Take 1 tablet (4 mg total) by mouth daily with breakfast 3/8/21   Laith Olivares MD   glucose blood (CONTOUR NEXT TEST) test strip Test blood sugar 3 times a day 2/21/19   Laith Olivares MD   glycopyrrolate-formoterol (BEVESPI AEROSPHERE) 9-4 8 MCG/ACT inhaler Inhale 2 puffs 2 (two) times a day 3/11/21   Jean Paul Connor PA-C   levalbuterol (XOPENEX) 1 25 mg/0 5 mL nebulizer solution Take 0 5 mL (1 25 mg total) by nebulization 2 (two) times a day 11/16/20   Johana Rousseau, DO   LIFESCAN UNISTIK II LANCETS MISC by Does not apply route 3 (three) times a day 2/27/19   Laith Olivares MD   loratadine (Claritin) 10 mg tablet Take by mouth    Historical Provider, MD   LORazepam (ATIVAN) 0 5 mg tablet TAKE 2 TABLETS BY MOUTH AT BEDTIME AND 1 EVERY 6 HOURS AS NEEDED FOR ANXIETY 4/7/21   Laith Olivares MD   Magnesium 400 MG CAPS Take by mouth 1 BID    Historical Provider, MD   metFORMIN (GLUCOPHAGE-XR) 500 mg 24 hr tablet Take 2 tablets (1,000 mg total) by mouth 2 (two) times a day with meals 10/21/20   Laith Olivares MD   metoprolol succinate (TOPROL-XL) 50 mg 24 hr tablet Take 1 tablet (50 mg total) by mouth daily 2/5/21   Laith Olivares MD   montelukast (SINGULAIR) 10 mg tablet Take 1 tablet (10 mg total) by mouth daily at bedtime 11/4/20   Laith Olivares MD   naloxone (NARCAN) 4 mg/0 1 mL nasal spray Administer 1 spray into a nostril  If breathing does not return to normal or if breathing difficulty resumes after 2-3 minutes, give another dose in the other nostril using a new spray   7/29/20   Laith Olivares MD   omeprazole (PriLOSEC) 40 MG capsule Take 1 capsule (40 mg total) by mouth daily 2/5/21   Laith Olivares MD   potassium chloride (K-DUR,KLOR-CON) 20 mEq tablet Take 1 tablet (20 mEq total) by mouth 2 (two) times a day 3/8/21   Laith Olivares MD   torsemide (DEMADEX) 20 mg tablet Two tabs b i d  4/16/21   Laith Olivares MD   traZODone (DESYREL) 150 mg tablet Take 1 tablet (150 mg total) by mouth daily at bedtime 2/5/21   Laith Olivares MD   clotrimazole (LOTRIMIN) 1 % cream Apply topically 2 (two) times a day  Patient not taking: Reported on 3/11/2021 8/20/20 5/2/21  Laith Olivares MD   docusate sodium (COLACE) 100 mg capsule Take 1 capsule (100 mg total) by mouth 2 (two) times a day  Patient not taking: Reported on 4/26/2021 11/16/20 5/2/21  Yusuf Sahu DO   fluticasone-umeclidinium-vilanterol (TRELEGY) 100-62 5-25 MCG/INH inhaler Inhale 1 puff daily Rinse mouth after use  Patient not taking: Reported on 3/11/2021 1/8/21 5/2/21  Ernesto Schumacher MD   predniSONE 20 mg tablet Take 2 tablets (40 mg total) by mouth daily  Patient not taking: Reported on 4/7/2021 3/11/21 5/2/21  Malvin Dubois PA-C   traZODone (DESYREL) 100 mg tablet Take 1 tablet (100 mg total) by mouth daily at bedtime  Patient not taking: Reported on 4/26/2021 1/13/21 5/2/21  Macho Ugarte MD     I have reviewed home medications with patient personally  Allergies: Allergies   Allergen Reactions    Iron Dextran Swelling    Januvia [Sitagliptin] Shortness Of Breath    Jardiance [Empagliflozin] Shortness Of Breath    Clonazepam Other (See Comments)     Unknown reaction    Codeine Itching and Other (See Comments)     itch;mary kay morphine,takes ultram @home    Adhesive [Medical Tape] Itching     itching    Effexor [Venlafaxine] Itching    Lactose - Food Allergy GI Intolerance    Oxycodone-Acetaminophen Anxiety    Oxycodone-Acetaminophen Itching and Rash     Other reaction(s):  Other (See Comments)  (PERCOCET) MILD RASH, not allergic to Acetaminophen        Social History:     Marital Status: Significant Other   Occupation: retired  Patient Pre-hospital Living Situation:  Lives at home  Patient Pre-hospital Level of Mobility:   Patient Pre-hospital Diet Restrictions:   Substance Use History:   Social History     Substance and Sexual Activity   Alcohol Use Not Currently    Alcohol/week: 20 0 standard drinks    Types: 20 Cans of beer per week    Frequency: 4 or more times a week    Drinks per session: 3 or 4    Binge frequency: Never    Comment: about 6 coors light every night, stopped 3 weeks ago      Social History     Tobacco Use   Smoking Status Former Smoker    Packs/day: 0 25    Years: 29 00    Pack years: 7 25    Types: Cigarettes    Start date: 200    Quit date: 12/10/2019    Years since quittin 3   Smokeless Tobacco Never Used     Social History     Substance and Sexual Activity   Drug Use No       Family History:    Family History   Problem Relation Age of Onset    Alzheimer's disease Mother     Atrial fibrillation Mother     Dementia Mother     Heart disease Mother         heart problem    Seizures Mother     Parkinsonism Father     Arthritis Father     Atrial fibrillation Father     Substance Abuse Neg Hx         mother,father    Mental illness Neg Hx         mother,father       Physical Exam:     Vitals:   Blood Pressure: 126/63 (21)  Pulse: 88 (21)  Temperature: 98 2 °F (36 8 °C) (21)  Temp Source: Oral (21)  Respirations: 18 (21)  Height: 5' 3" (160 cm) (21)  Weight - Scale: 129 kg (285 lb 6 4 oz) (21)  SpO2: 97 % (21)    Physical Exam  Constitutional:       General: She is not in acute distress  Appearance: Normal appearance  She is obese  HENT:      Head: Normocephalic and atraumatic  Nose: Nose normal    Eyes:      General: No scleral icterus  Pupils: Pupils are equal, round, and reactive to light  Neck:      Musculoskeletal: Normal range of motion and neck supple  Cardiovascular:      Rate and Rhythm: Normal rate  Pulses: Normal pulses  Pulmonary:      Effort: Pulmonary effort is normal  No respiratory distress  Comments: Decreased breath sounds bilateral  Abdominal:      General: Abdomen is flat  There is no distension  Tenderness: There is abdominal tenderness (Right lower quadrant tenderness)  There is no guarding or rebound  Musculoskeletal: Normal range of motion  Skin:     General: Skin is warm  Neurological:      General: No focal deficit present  Mental Status: She is alert             Additional Data:     Lab Results: I have personally reviewed pertinent reports  Results from last 7 days   Lab Units 05/02/21  1422   WBC Thousand/uL 16 19*   HEMOGLOBIN g/dL 10 9*   HEMATOCRIT % 36 3   PLATELETS Thousands/uL 316   NEUTROS PCT % 85*   LYMPHS PCT % 7*   MONOS PCT % 6   EOS PCT % 1     Results from last 7 days   Lab Units 05/02/21  1422   SODIUM mmol/L 136   POTASSIUM mmol/L 4 2   CHLORIDE mmol/L 95*   CO2 mmol/L 33*   BUN mg/dL 9   CREATININE mg/dL 0 82   ANION GAP mmol/L 8   CALCIUM mg/dL 8 3   ALBUMIN g/dL 3 3*   TOTAL BILIRUBIN mg/dL 0 40   ALK PHOS U/L 175*   ALT U/L 31   AST U/L 20   GLUCOSE RANDOM mg/dL 312*                 Results from last 7 days   Lab Units 05/02/21  1638 05/02/21  1422   LACTIC ACID mmol/L 2 7* 3 0*       Imaging: I have personally reviewed pertinent reports  CT Abdomen pelvis with contrast   Final Result by Ana Bronson MD (05/02 9495)   1  Findings compatible with acute diverticulitis at the redundant mid sigmoid colon which extends into the right lower quadrant  Normal appendix  2   Two polyps suggested along the greater curvature of the gastric body measuring up to 9 mm in size  GI consultation advised  The study was marked in [facility] for immediate notification  Workstation performed: [id number]             EKG, Pathology, and Other Studies Reviewed on Admission:   · EKG:     Allscripts / Epic Records Reviewed: Yes     ** Please Note: This note has been constructed using a voice recognition system   **

## 2021-05-02 NOTE — ASSESSMENT & PLAN NOTE
· Patient met the criteria for sepsis at the time of presentation with fever and leukocytosis with lactic acidosis  · The likely sources diverticulitis  · IV antibiotics  · Trend lactic acid to ensure resolution

## 2021-05-02 NOTE — ASSESSMENT & PLAN NOTE
· Continue with the supplemental oxygen  · Patient with asthma with obstructive sleep apnea  · Continue with the respiratory protocol  · Patient uses CPAP at night  · Patient is on chronic diuretics, will hold torsemide for the time being given decreased p o   Intake and lactic acidosis/ infection-reassess the need for torsemide tomorrow

## 2021-05-02 NOTE — ED PROVIDER NOTES
History  Chief Complaint   Patient presents with    Abdominal Pain     pt reports right lower abdominal pain that started yesterday, associated with some loose bowels  Patient complains of right lower abd pain sharp, dull, crampy, pressure intermittent better when she lays and rests back, started yesterday with associated loose stools  She has hx of ovary cyst   Did not take anything specific for symptoms  No n/v   Small amount non-bloody diarrhea  Prior to Admission Medications   Prescriptions Last Dose Informant Patient Reported? Taking?    Blood Glucose Monitoring Suppl ([place] CONTOUR NEXT MONITOR) w/Device KIT  Self Yes No   Sig: by Does not apply route daily   Cholecalciferol 1000 units tablet  Self Yes No   Sig: Take 1,000 Units by mouth daily     LIFESCAN UNISTIK II LANCETS MISC  Self No No   Sig: by Does not apply route 3 (three) times a day   LORazepam (ATIVAN) 0 5 mg tablet  Self No No   Sig: TAKE 2 TABLETS BY MOUTH AT BEDTIME AND 1 EVERY 6 HOURS AS NEEDED FOR ANXIETY   Magnesium 400 MG CAPS  Self Yes No   Sig: Take by mouth 1 BID   albuterol (PROVENTIL HFA,VENTOLIN HFA) 90 mcg/act inhaler  Self No No   Sig: Inhale 2 puffs every 4 (four) hours as needed for wheezing   apixaban (Eliquis) 5 mg  Self No No   Sig: Take 1 tablet (5 mg total) by mouth 2 (two) times a day   ascorbic acid (VITAMIN C) 1000 MG tablet  Self Yes No   Sig: Take 1,000 mg by mouth daily     atorvastatin (LIPITOR) 40 mg tablet  Self No No   Sig: Take 1 tablet (40 mg total) by mouth daily   citalopram (CeleXA) 20 mg tablet  Self No No   Sig: Take 1 tablet (20 mg total) by mouth daily   cyanocobalamin (VITAMIN B-12) 100 mcg tablet  Self Yes No   Sig: Take by mouth daily   ferrous sulfate 324 (65 Fe) mg   No No   Sig: Take 1 tablet (324 mg total) by mouth daily before breakfast   Patient not taking: Reported on 4/26/2021   flecainide (TAMBOCOR) 100 mg tablet  Self No No   Sig: Take 1 tablet (100 mg total) by mouth every 12 (twelve) hours   fluticasone (FLONASE) 50 mcg/act nasal spray  Self No No   Si spray into each nostril 2 (two) times a day   fluticasone (FLOVENT HFA) 220 mcg/act inhaler   No No   Sig: Inhale 2 puffs 2 (two) times a day Rinse mouth after use  glimepiride (AMARYL) 4 mg tablet  Self No No   Sig: Take 1 tablet (4 mg total) by mouth daily with breakfast   glucose blood (CONTOUR NEXT TEST) test strip  Self No No   Sig: Test blood sugar 3 times a day   glycopyrrolate-formoterol (BEVESPI AEROSPHERE) 9-4 8 MCG/ACT inhaler  Self No No   Sig: Inhale 2 puffs 2 (two) times a day   levalbuterol (XOPENEX) 1 25 mg/0 5 mL nebulizer solution  Self No No   Sig: Take 0 5 mL (1 25 mg total) by nebulization 2 (two) times a day   loratadine (Claritin) 10 mg tablet  Self Yes No   Sig: Take by mouth   metFORMIN (GLUCOPHAGE-XR) 500 mg 24 hr tablet  Self No No   Sig: Take 2 tablets (1,000 mg total) by mouth 2 (two) times a day with meals   metoprolol succinate (TOPROL-XL) 50 mg 24 hr tablet  Self No No   Sig: Take 1 tablet (50 mg total) by mouth daily   montelukast (SINGULAIR) 10 mg tablet  Self No No   Sig: Take 1 tablet (10 mg total) by mouth daily at bedtime   naloxone (NARCAN) 4 mg/0 1 mL nasal spray  Self No No   Sig: Administer 1 spray into a nostril  If breathing does not return to normal or if breathing difficulty resumes after 2-3 minutes, give another dose in the other nostril using a new spray     omeprazole (PriLOSEC) 40 MG capsule  Self No No   Sig: Take 1 capsule (40 mg total) by mouth daily   potassium chloride (K-DUR,KLOR-CON) 20 mEq tablet  Self No No   Sig: Take 1 tablet (20 mEq total) by mouth 2 (two) times a day   torsemide (DEMADEX) 20 mg tablet  Self No No   Sig: Two tabs b i d    traZODone (DESYREL) 150 mg tablet  Self No No   Sig: Take 1 tablet (150 mg total) by mouth daily at bedtime      Facility-Administered Medications: None       Past Medical History:   Diagnosis Date    Alcohol abuse 2020    Alcohol abuse 2020    Anemia     Atrial fibrillation (HCC)     Cervical radiculopathy     [facility]estive heart failure) [facility])     Chronic low back pain     [facility] 2018    Community acquired pneumonia     Community acquired pneumonia 2020    Diabetes mellitus ([facility] )     Diabetes mellitus with hyperglycemia (HCC)     Elevated liver enzymes     GERD (gastroesophageal reflux disease)     Hypersomnolence 10/28/2020    Hypokalemia 2018    Paresthesia of upper extremity     Plantar fasciitis     Restless leg     Sexual dysfunction     Sleep apnea, obstructive     Tenosynovitis of finger     Tinea corporis     Weakness of upper extremity        Past Surgical History:   Procedure Laterality Date    ABDOMINAL SURGERY      CARPAL TUNNEL RELEASE Bilateral      SECTION      CHOLECYSTECTOMY      laparoscopic    ESOPHAGOGASTRODUODENOSCOPY N/A 12/3/2018    Procedure: ESOPHAGOGASTRODUODENOSCOPY (EGD) AND COLONOSCOPY;  Surgeon: Suresh Haynes MD;  [facility];  Service: Gastroenterology    GASTRIC BYPASS      for morbid obesity with gilda en y    HERNIA REPAIR      HYSTERECTOMY      VT EXCIS PRIMARY GANGLION WRIST Left 2017    Procedure: LONG FINGER GANGLION CYST EXCISION;  Surgeon: Erica Rust MD;  [facility];  Service: Orthopedics    VT INCISE FINGER TENDON SHEATH Left 2017    Procedure: Sharmaine Ramp, RING, TRIGGER FINGER RELEASE;  Surgeon: Erica Rust MD;  [facility];  Service: Orthopedics    SHOULDER SURGERY         Family History   Problem Relation Age of Onset    Alzheimer's disease Mother     Atrial fibrillation Mother     Dementia Mother     Heart disease Mother         heart problem    Seizures Mother     Parkinsonism Father     Arthritis Father     Atrial fibrillation Father     Substance Abuse Neg Hx         mother,father    Mental illness Neg Hx         mother,father     I have reviewed and agree with the history as documented  E-Cigarette/Vaping    E-Cigarette Use Never User      E-Cigarette/Vaping Substances    Nicotine No     THC No     CBD No     Flavoring No     Other No     Unknown No      Social History     Tobacco Use    Smoking status: Former Smoker     Packs/day: 0 25     Years: 29 00     Pack years: 7 25     Types: Cigarettes     Start date: 200     Quit date: 12/10/2019     Years since quittin 3    Smokeless tobacco: Never Used   Substance Use Topics    Alcohol use: Not Currently     Alcohol/week: 20 0 standard drinks     Types: 20 Cans of beer per week     Frequency: 4 or more times a week     Drinks per session: 3 or 4     Binge frequency: Never     Comment: about 6 [employer]ors light every night, stopped 3 weeks ago     Drug use: No       Review of Systems   Constitutional: Negative for chills and fever  HENT: Negative for rhinorrhea and sore throat  Respiratory: Negative for shortness of breath  Cardiovascular: Negative for chest pain  Gastrointestinal: Positive for abdominal pain and diarrhea  Negative for constipation, nausea and vomiting  Genitourinary: Negative for dysuria and frequency  Skin: Negative for rash  All other systems reviewed and are negative  Physical Exam  Physical Exam  Vitals signs and nursing note reviewed  Constitutional:       Appearance: She is well-developed  She is obese  HENT:      Head: Normocephalic and atraumatic  Right Ear: External ear normal       Left Ear: External ear normal       Nose: Nose normal    Eyes:      Conjunctiva/sclera: Conjunctivae normal       Pupils: Pupils are equal, round, and reactive to light  Neck:      Musculoskeletal: Normal range of motion and neck supple  No spinous process tenderness  Cardiovascular:      Rate and Rhythm: Normal rate and regular rhythm  Heart sounds: Normal heart sounds     Pulmonary:      Effort: Pulmonary effort is normal  No respiratory distress  Breath sounds: Normal breath sounds  No wheezing  Abdominal:      General: Bowel sounds are normal  There is no distension  Palpations: Abdomen is soft  Tenderness: There is abdominal tenderness in the right lower quadrant  There is no guarding or rebound  Musculoskeletal: Normal range of motion  Skin:     General: Skin is warm and dry  Findings: No rash  Neurological:      Mental Status: She is alert  GCS: GCS eye subscore is 4  GCS verbal subscore is 5  GCS motor subscore is 6  Sensory: No sensory deficit           Vital Signs  ED Triage Vitals   Temperature Pulse Respirations Blood Pressure SpO2   05/02/21 1408 05/02/21 1408 05/02/21 1408 05/02/21 1409 05/02/21 1408   97 8 °F (36 6 °C) 90 20 116/69 90 %      Temp Source Heart Rate Source Patient Position - Orthostatic VS BP Location FiO2 (%)   05/02/21 1408 05/02/21 1408 05/02/21 1409 05/02/21 1409 --   Temporal Monitor Lying Right arm       Pain Score       --                  Vitals:    05/02/21 1408 05/02/21 1409 05/02/21 1600   BP:  116/69 119/69   Pulse: 90  80   Patient Position - Orthostatic VS:  Lying Lying         Visual Acuity      ED Medications  Medications   sodium chloride 0 9 % bolus 500 mL (0 mL Intravenous Stopped 5/2/21 1527)   acetaminophen (TYLENOL) tablet 975 mg (975 mg Oral Given 5/2/21 1426)   sodium chloride 0 9 % bolus 500 mL (500 mL Intravenous New Bag 5/2/21 1600)   iohexol (OMNIPAQUE) 350 MG/ML injection (SINGLE-DOSE) 100 mL (100 mL Intravenous Given 5/2/21 1530)   piperacillin-tazobactam (ZOSYN) IVPB 3 375 g (3 375 g Intravenous New Bag 5/2/21 1630)       Diagnostic Studies  Results Reviewed     Procedure Component Value Units Date/Time    Lactic acid 2 Hours [[id number]]  (Abnormal) Collected: 05/02/21 1638    Lab Status: Final result Specimen: Blood from Arm, Left Updated: 05/02/21 1736     LACTIC ACID 2 7 mmol/L     Narrative:      Result may be elevated if tourniquet was used during collection  Blood culture [[id number]] Collected: 05/02/21 1459    Lab Status: In process Specimen: Blood from Arm, Right Updated: 05/02/21 1506    Blood culture [[id number]] Collected: 05/02/21 1459    Lab Status: In process Specimen: Blood from Arm, Right Updated: 05/02/21 1506    Lactic acid, plasma [[id number]]  (Abnormal) Collected: 05/02/21 1422    Lab Status: Final result Specimen: Blood from Arm, Left Updated: 05/02/21 1455     LACTIC ACID 3 0 mmol/L     Narrative:      Result may be elevated if tourniquet was used during collection      CMP [[id number]]  (Abnormal) Collected: 05/02/21 1422    Lab Status: Final result Specimen: Blood from Arm, Left Updated: 05/02/21 1448     Sodium 136 mmol/L      Potassium 4 2 mmol/L      Chloride 95 mmol/L      CO2 33 mmol/L      ANION GAP 8 mmol/L      BUN 9 mg/dL      Creatinine 0 82 mg/dL      Glucose 312 mg/dL      Calcium 8 3 mg/dL      Corrected Calcium 8 9 mg/dL      AST 20 U/L      ALT 31 U/L      Alkaline Phosphatase 175 U/L      Total Protein 7 0 g/dL      Albumin 3 3 g/dL      Total Bilirubin 0 40 mg/dL      eGFR 77 ml/min/1 73sq m     Narrative:      [place] guidelines for Chronic Kidney Disease (CKD):     Stage 1 with normal or high GFR (GFR > 90 mL/min/1 73 square meters)    Stage 2 Mild CKD (GFR = 60-89 mL/min/1 73 square meters)    Stage 3A Moderate CKD (GFR = 45-59 mL/min/1 73 square meters)    Stage 3B Moderate CKD (GFR = 30-44 mL/min/1 73 square meters)    Stage 4 Severe CKD (GFR = 15-29 mL/min/1 73 square meters)    Stage 5 End Stage CKD (GFR <15 mL/min/1 73 square meters)  Note: GFR calculation is accurate only with a steady state creatinine    CBC and differential [[id number]]  (Abnormal) Collected: 05/02/21 1422    Lab Status: Final result Specimen: Blood from Arm, Left Updated: 05/02/21 1432     WBC 16 19 Thousand/uL      RBC 5 06 Million/uL      Hemoglobin 10 9 g/dL      Hematocrit 36 3 %      MCV 72 fL      MCH 21 5 pg      MCHC 30 0 g/dL      RDW 16 9 %      MPV 10 8 fL      Platelets 802 Thousands/uL      nRBC 0 /100 WBCs      Neutrophils Relative 85 %      Immat GRANS % 1 %      Lymphocytes Relative 7 %      Monocytes Relative 6 %      Eosinophils Relative 1 %      Basophils Relative 0 %      Neutrophils Absolute 13 79 Thousands/µL      Immature Grans Absolute 0 08 Thousand/uL      Lymphocytes Absolute 1 13 Thousands/µL      Monocytes Absolute 1 00 Thousand/µL      Eosinophils Absolute 0 14 Thousand/µL      Basophils Absolute 0 05 Thousands/µL     UA w Reflex to Microscopic w Reflex to Culture [[id number]]     Lab Status: No result Specimen: Urine                  CT Abdomen pelvis with contrast   Final Result by Arturo Rueda MD (05/02 2528)   1  Findings compatible with acute diverticulitis at the redundant mid sigmoid colon which extends into the right lower quadrant  Normal appendix  2   Two polyps suggested along the greater curvature of the gastric body measuring up to 9 mm in size  GI consultation advised  The study was marked in [facility] for immediate notification  Workstation performed: [id number]                    Procedures  Procedures         ED Course                         Initial Sepsis Screening     [address] Name 05/02/21 1612                Is the patient's history suggestive of a new or worsening infection?   (!) Yes (Proceed)  -GAYLA        Suspected source of infection  acute abdominal infection  -GAYLA        Are two or more of the following signs & symptoms of infection both present and new to the patient?  --        Indicate SIRS criteria  Leukocytosis (WBC > 06468 IJL)  -GAYLA        If the answer is yes to both questions, suspicion of sepsis is present  --        If severe sepsis is present AND tissue hypoperfusion perists in the hour after fluid resuscitation or lactate > 4, the patient meets criteria for SEPTIC SHOCK  --        Are any of the following organ dysfunction criteria present within 6 hours of suspected infection and SIRS criteria that are NOT considered to be chronic conditions? --        Organ dysfunction  Lactate > 2 0 mmol/L  -GAYLA        Date of presentation of severe sepsis  05/02/21  -GAYLA        Time of presentation of severe sepsis  1615  -GAYLA        Tissue hypoperfusion persists in the hour after crystalloid fluid administration, evidenced, by either:  --        Was hypotension present within one hour of the conclusion of crystalloid fluid administration?  --        Date of presentation of septic shock  --        Time of presentation of septic shock  --          User Key  (r) = Recorded By, (t) = Taken By, (c) = Cosigned By    [address] Name Provider Type    [address], DO Physician                        MDM  Number of Diagnoses or Management Options  Diverticulitis: new and requires workup  Severe sepsis [facility]): new and requires workup     Amount and/or Complexity of Data Reviewed  Clinical lab tests: ordered and reviewed  Tests in the radiology section of CPT®: ordered and reviewed  Discuss the patient with other providers: yes    Patient Progress  Patient progress: improved      Disposition  Final diagnoses:   Diverticulitis   Severe sepsis ([address] )     Time reflects when diagnosis was documented in both MDM as applicable and the Disposition within this note     Time User Action Codes Description Comment    5/2/2021  4:15 PM Abad Engel [K57 92] Diverticulitis     5/2/2021  4:15 PM Ministerio De Jesus, [address] [place] [address] [A49 9,  M79 20] Severe sepsis [facility])       ED Disposition     ED Disposition Condition Date/Time Comment    Admit Stable Sun May 2, 2021  4:15 PM Case was discussed with Yvonne Goldstein* and the patient's admission status was agreed to be Admission Status: inpatient status to the service of Dr Kiki Colon**           Follow-up Information    None         Current Discharge Medication List      CONTINUE these medications which have NOT CHANGED    Details   albuterol (PROVENTIL HFA,VENTOLIN HFA) 90 mcg/act inhaler Inhale 2 puffs every 4 (four) hours as needed for wheezing  Qty: 1 Inhaler, Refills: 4    Comments: Substitution to a formulary equivalent within the same pharmaceutical class is authorized  Associated Diagnoses: Asthma-COPD overlap syndrome ([facility])      apixaban (Eliquis) 5 mg Take 1 tablet (5 mg total) by mouth 2 (two) times a day  Qty: 60 tablet, Refills: 5    Associated Diagnoses: Atrial fibrillation, unspecified type ([facility])      ascorbic acid (VITAMIN C) 1000 MG tablet Take 1,000 mg by mouth daily        atorvastatin (LIPITOR) 40 mg tablet Take 1 tablet (40 mg total) by mouth daily  Qty: 30 tablet, Refills: 0    Associated Diagnoses: Diabetes mellitus type 2 in obese ([facility])      Blood Glucose Monitoring Suppl ([place] CONTOUR NEXT MONITOR) w/Device KIT by Does not apply route daily      Cholecalciferol 1000 units tablet Take 1,000 Units by mouth daily        citalopram (CeleXA) 20 mg tablet Take 1 tablet (20 mg total) by mouth daily  Qty: 30 tablet, Refills: 1    Associated Diagnoses: Current moderate episode of major depressive disorder without prior episode ([facility])      cyanocobalamin (VITAMIN B-12) 100 mcg tablet Take by mouth daily      ferrous sulfate 324 (65 Fe) mg Take 1 tablet (324 mg total) by mouth daily before breakfast  Qty: 30 tablet, Refills: 0    Associated Diagnoses: Microcytic anemia      flecainide (TAMBOCOR) 100 mg tablet Take 1 tablet (100 mg total) by mouth every 12 (twelve) hours  Qty: 180 tablet, Refills: 1    Associated Diagnoses: Atrial fibrillation, unspecified type ([facility])      fluticasone (FLONASE) 50 mcg/act nasal spray 1 spray into each nostril 2 (two) times a day  Qty: 1 Bottle, Refills: 3    Associated Diagnoses: Severe persistent asthma with acute exacerbation      fluticasone (FLOVENT HFA) 220 mcg/act inhaler Inhale 2 puffs 2 (two) times a day Rinse mouth after use    Qty: 1 Inhaler, Refills: 6    Associated Diagnoses: Asthma-COPD overlap syndrome ([facility])      glimepiride (AMARYL) 4 mg tablet Take 1 tablet (4 mg total) by mouth daily with breakfast  Qty: 30 tablet, Refills: 0    Associated Diagnoses: Type 2 diabetes mellitus with hyperglycemia, without long-term current use of insulin ([facility])      glucose blood (CONTOUR NEXT TEST) test strip Test blood sugar 3 times a day  Qty: 100 each, Refills: 3    Associated Diagnoses: Type 2 diabetes mellitus with hyperglycemia, unspecified whether long term insulin use ([facility])      glycopyrrolate-formoterol (BEVESPI AEROSPHERE) 9-4 8 MCG/ACT inhaler Inhale 2 puffs 2 (two) times a day  Qty: 10 7 g, Refills: 5    Associated Diagnoses: Asthma with COPD with exacerbation ([facility])      levalbuterol (XOPENEX) 1 25 mg/0 5 mL nebulizer solution Take 0 5 mL (1 25 mg total) by nebulization 2 (two) times a day  Qty: 60 vial, Refills: 0    Associated Diagnoses: Asthma with COPD with exacerbation ([facility] )      LIFESCAN UNISTIK II LANCETS MISC by Does not apply route 3 (three) times a day  Qty: 100 each, Refills: 5    Associated Diagnoses: Type 2 diabetes mellitus with hyperglycemia, unspecified whether long term insulin use ([facility])      loratadine (Claritin) 10 mg tablet Take by mouth      LORazepam (ATIVAN) 0 5 mg tablet TAKE 2 TABLETS BY MOUTH AT BEDTIME AND 1 EVERY 6 HOURS AS NEEDED FOR ANXIETY  Qty: 90 tablet, Refills: 0    Associated Diagnoses: Encounter for long-term (current) drug use      Magnesium 400 MG CAPS Take by mouth 1 BID      metFORMIN (GLUCOPHAGE-XR) 500 mg 24 hr tablet Take 2 tablets (1,000 mg total) by mouth 2 (two) times a day with meals  Qty: 120 tablet, Refills: 5    Comments: Please consider 90 day supplies to promote better adherence  Associated Diagnoses: Chronic obstructive pulmonary disease, unspecified COPD type ([facility])      metoprolol succinate (TOPROL-XL) 50 mg 24 hr tablet Take 1 tablet (50 mg total) by mouth daily  Qty: 90 tablet, Refills: 1    Associated Diagnoses: Essential hypertension      montelukast (SINGULAIR) 10 mg tablet Take 1 tablet (10 mg total) by mouth daily at bedtime  Qty: 90 tablet, Refills: 3    Associated Diagnoses: Severe persistent asthma with acute exacerbation      naloxone (NARCAN) 4 mg/0 1 mL nasal spray Administer 1 spray into a nostril  If breathing does not return to normal or if breathing difficulty resumes after 2-3 minutes, give another dose in the other nostril using a new spray  Qty: 1 each, Refills: 1    Associated Diagnoses: Other chronic pain      omeprazole (PriLOSEC) 40 MG capsule Take 1 capsule (40 mg total) by mouth daily  Qty: 90 capsule, Refills: 3    Associated Diagnoses: Gastroesophageal reflux disease without esophagitis      potassium chloride (K-DUR,KLOR-CON) 20 mEq tablet Take 1 tablet (20 mEq total) by mouth 2 (two) times a day  Qty: 180 tablet, Refills: 1    Associated Diagnoses: Acute diastolic congestive heart failure (HCC)      torsemide (DEMADEX) 20 mg tablet Two tabs b i d   Qty: 120 tablet, Refills: 0    Associated Diagnoses: Chronic diastolic heart failure (HCC)      traZODone (DESYREL) 150 mg tablet Take 1 tablet (150 mg total) by mouth daily at bedtime  Qty: 90 tablet, Refills: 0    Associated Diagnoses: Insomnia, unspecified type           No discharge procedures on file      PDMP Review       Value Time User    PDMP Reviewed  Yes 4/7/2021 12:59 PM Milan Rey MD          ED Provider  Electronically Signed by           Rianna Birmingham DO  05/02/21 6630

## 2021-05-02 NOTE — ASSESSMENT & PLAN NOTE
· Patient came to the hospital with abdominal pain for 2 days  CT scan was consistent with acute diverticulitis of the sigmoid  · Started on Zosyn which we will continue  · Clear liquid diet for now  · Once the pain improves we can advance the diet  · GI evaluateion  · Patient reported that she had colonoscopy in the past and had polyps removed    Patient was supposed to have a repeat colonoscopy but unfortunately patient had a colonoscopy for 2 years  · GI eval

## 2021-05-02 NOTE — SEPSIS NOTE
Sepsis Note   Mercedes Gravely 56 y o  female MRN: [id number]  Unit/Bed#: -01 Encounter: [id number]      qSOFA     Row Name 05/02/21 1600 05/02/21 1409 05/02/21 1408          Altered mental status GCS < 15  --  --  --      Respiratory Rate > / =22  0  --  0      Systolic BP < / =359  0  0  --      Q Sofa Score  0  0  --          Initial Sepsis Screening     Row Name 05/02/21 1612                Is the patient's history suggestive of a new or worsening infection? (!) Yes (Proceed)  -GAYLA        Suspected source of infection  acute abdominal infection  -GAYLA        Are two or more of the following signs & symptoms of infection both present and new to the patient?  --        Indicate SIRS criteria  Leukocytosis (WBC > 42081 IJL)  -GAYLA        If the answer is yes to both questions, suspicion of sepsis is present  --        If severe sepsis is present AND tissue hypoperfusion perists in the hour after fluid resuscitation or lactate > 4, the patient meets criteria for SEPTIC SHOCK  --        Are any of the following organ dysfunction criteria present within 6 hours of suspected infection and SIRS criteria that are NOT considered to be chronic conditions?   --        Organ dysfunction  Lactate > 2 0 mmol/L  -GAYLA        Date of presentation of severe sepsis  05/02/21  -GAYLA        Time of presentation of severe sepsis  1615  -GAYLA        Tissue hypoperfusion persists in the hour after crystalloid fluid administration, evidenced, by either:  --no        Was hypotension present within one hour of the conclusion of crystalloid fluid administration?  --no        Date of presentation of septic shock  --        Time of presentation of septic shock  --          User Key  (r) = Recorded By, (t) = Taken By, (c) = Cosigned By    [address] Name Provider Type    [address], DO Physician

## 2021-05-02 NOTE — ASSESSMENT & PLAN NOTE
Lab Results   Component Value Date    HGBA1C 8 4 (A) 10/28/2020       No results for input(s): POCGLU in the last 72 hours  Blood Sugar Average: Last 72 hrs:   patient is on Amaryl and also metformin as an outpatient    Last hemoglobin A1c of 8 4  Start on Lantus at low-dose  Insulin sliding scale coverage  Patient is going to be on diabetic clear liquid

## 2021-05-03 PROBLEM — E87.20 LACTIC ACIDOSIS: Status: RESOLVED | Noted: 2020-05-22 | Resolved: 2021-05-03

## 2021-05-03 PROBLEM — E87.2 LACTIC ACIDOSIS: Status: RESOLVED | Noted: 2020-05-22 | Resolved: 2021-05-03

## 2021-05-03 LAB
ALBUMIN SERPL BCP-MCNC: 2.9 G/DL (ref 3.5–5)
ALP SERPL-CCNC: 157 U/L (ref 46–116)
ALT SERPL W P-5'-P-CCNC: 27 U/L (ref 12–78)
ANION GAP SERPL CALCULATED.3IONS-SCNC: 5 MMOL/L (ref 4–13)
AST SERPL W P-5'-P-CCNC: 17 U/L (ref 5–45)
BILIRUB SERPL-MCNC: 0.5 MG/DL (ref 0.2–1)
BUN SERPL-MCNC: 7 MG/DL (ref 5–25)
CALCIUM ALBUM COR SERPL-MCNC: 9.4 MG/DL (ref 8.3–10.1)
CALCIUM SERPL-MCNC: 8.5 MG/DL (ref 8.3–10.1)
CHLORIDE SERPL-SCNC: 100 MMOL/L (ref 100–108)
CO2 SERPL-SCNC: 34 MMOL/L (ref 21–32)
CREAT SERPL-MCNC: 0.68 MG/DL (ref 0.6–1.3)
GFR SERPL CREATININE-BSD FRML MDRD: 94 ML/MIN/1.73SQ M
GLUCOSE SERPL-MCNC: 153 MG/DL (ref 65–140)
GLUCOSE SERPL-MCNC: 187 MG/DL (ref 65–140)
GLUCOSE SERPL-MCNC: 224 MG/DL (ref 65–140)
GLUCOSE SERPL-MCNC: 280 MG/DL (ref 65–140)
GLUCOSE SERPL-MCNC: 284 MG/DL (ref 65–140)
LACTATE SERPL-SCNC: 1.3 MMOL/L (ref 0.5–2)
MAGNESIUM SERPL-MCNC: 1.9 MG/DL (ref 1.6–2.6)
POTASSIUM SERPL-SCNC: 3.8 MMOL/L (ref 3.5–5.3)
PROT SERPL-MCNC: 6.2 G/DL (ref 6.4–8.2)
SODIUM SERPL-SCNC: 139 MMOL/L (ref 136–145)

## 2021-05-03 PROCEDURE — 80053 COMPREHEN METABOLIC PANEL: CPT | Performed by: INTERNAL MEDICINE

## 2021-05-03 PROCEDURE — 99254 IP/OBS CNSLTJ NEW/EST MOD 60: CPT | Performed by: INTERNAL MEDICINE

## 2021-05-03 PROCEDURE — 99232 SBSQ HOSP IP/OBS MODERATE 35: CPT | Performed by: INTERNAL MEDICINE

## 2021-05-03 PROCEDURE — 94660 CPAP INITIATION&MGMT: CPT

## 2021-05-03 PROCEDURE — 83735 ASSAY OF MAGNESIUM: CPT | Performed by: INTERNAL MEDICINE

## 2021-05-03 PROCEDURE — 83605 ASSAY OF LACTIC ACID: CPT | Performed by: INTERNAL MEDICINE

## 2021-05-03 PROCEDURE — 82948 REAGENT STRIP/BLOOD GLUCOSE: CPT

## 2021-05-03 PROCEDURE — 94640 AIRWAY INHALATION TREATMENT: CPT

## 2021-05-03 PROCEDURE — 94760 N-INVAS EAR/PLS OXIMETRY 1: CPT

## 2021-05-03 RX ORDER — HYDROMORPHONE HCL/PF 1 MG/ML
0.5 SYRINGE (ML) INJECTION EVERY 4 HOURS PRN
Status: DISCONTINUED | OUTPATIENT
Start: 2021-05-03 | End: 2021-05-05 | Stop reason: HOSPADM

## 2021-05-03 RX ORDER — TORSEMIDE 20 MG/1
40 TABLET ORAL 2 TIMES DAILY
Status: DISCONTINUED | OUTPATIENT
Start: 2021-05-03 | End: 2021-05-05 | Stop reason: HOSPADM

## 2021-05-03 RX ADMIN — FLECAINIDE ACETATE 100 MG: 100 TABLET ORAL at 08:31

## 2021-05-03 RX ADMIN — INSULIN LISPRO 1 UNITS: 100 INJECTION, SOLUTION INTRAVENOUS; SUBCUTANEOUS at 22:56

## 2021-05-03 RX ADMIN — CITALOPRAM HYDROBROMIDE 20 MG: 20 TABLET ORAL at 08:30

## 2021-05-03 RX ADMIN — FLUTICASONE PROPIONATE 2 PUFF: 220 AEROSOL, METERED RESPIRATORY (INHALATION) at 08:31

## 2021-05-03 RX ADMIN — TRAZODONE HYDROCHLORIDE 150 MG: 50 TABLET ORAL at 22:46

## 2021-05-03 RX ADMIN — HYDROMORPHONE HYDROCHLORIDE 0.5 MG: 1 INJECTION, SOLUTION INTRAMUSCULAR; INTRAVENOUS; SUBCUTANEOUS at 02:52

## 2021-05-03 RX ADMIN — APIXABAN 5 MG: 5 TABLET, FILM COATED ORAL at 17:08

## 2021-05-03 RX ADMIN — INSULIN LISPRO 4 UNITS: 100 INJECTION, SOLUTION INTRAVENOUS; SUBCUTANEOUS at 08:28

## 2021-05-03 RX ADMIN — Medication 1000 UNITS: at 08:30

## 2021-05-03 RX ADMIN — PIPERACILLIN AND TAZOBACTAM 3.38 G: 3; .375 INJECTION, POWDER, LYOPHILIZED, FOR SOLUTION INTRAVENOUS at 10:10

## 2021-05-03 RX ADMIN — PIPERACILLIN AND TAZOBACTAM 3.38 G: 3; .375 INJECTION, POWDER, LYOPHILIZED, FOR SOLUTION INTRAVENOUS at 22:46

## 2021-05-03 RX ADMIN — FLECAINIDE ACETATE 100 MG: 100 TABLET ORAL at 20:20

## 2021-05-03 RX ADMIN — LEVALBUTEROL HYDROCHLORIDE 1.25 MG: 1.25 SOLUTION, CONCENTRATE RESPIRATORY (INHALATION) at 09:33

## 2021-05-03 RX ADMIN — Medication 100 MCG: at 08:30

## 2021-05-03 RX ADMIN — FLUTICASONE PROPIONATE 2 PUFF: 220 AEROSOL, METERED RESPIRATORY (INHALATION) at 17:07

## 2021-05-03 RX ADMIN — TORSEMIDE 40 MG: 20 TABLET ORAL at 10:09

## 2021-05-03 RX ADMIN — INSULIN LISPRO 6 UNITS: 100 INJECTION, SOLUTION INTRAVENOUS; SUBCUTANEOUS at 12:28

## 2021-05-03 RX ADMIN — TORSEMIDE 40 MG: 20 TABLET ORAL at 20:20

## 2021-05-03 RX ADMIN — Medication 400 MG: at 08:30

## 2021-05-03 RX ADMIN — LORAZEPAM 0.5 MG: 0.5 TABLET ORAL at 22:47

## 2021-05-03 RX ADMIN — PIPERACILLIN AND TAZOBACTAM 3.38 G: 3; .375 INJECTION, POWDER, LYOPHILIZED, FOR SOLUTION INTRAVENOUS at 17:08

## 2021-05-03 RX ADMIN — HYDROMORPHONE HYDROCHLORIDE 0.5 MG: 1 INJECTION, SOLUTION INTRAMUSCULAR; INTRAVENOUS; SUBCUTANEOUS at 08:39

## 2021-05-03 RX ADMIN — POTASSIUM CHLORIDE 20 MEQ: 1500 TABLET, EXTENDED RELEASE ORAL at 08:30

## 2021-05-03 RX ADMIN — FLUTICASONE PROPIONATE 1 SPRAY: 50 SPRAY, METERED NASAL at 08:31

## 2021-05-03 RX ADMIN — ATORVASTATIN CALCIUM 40 MG: 40 TABLET, FILM COATED ORAL at 17:08

## 2021-05-03 RX ADMIN — OXYCODONE HYDROCHLORIDE AND ACETAMINOPHEN 1000 MG: 500 TABLET ORAL at 08:30

## 2021-05-03 RX ADMIN — PANTOPRAZOLE SODIUM 40 MG: 40 TABLET, DELAYED RELEASE ORAL at 05:25

## 2021-05-03 RX ADMIN — INSULIN LISPRO 6 UNITS: 100 INJECTION, SOLUTION INTRAVENOUS; SUBCUTANEOUS at 17:06

## 2021-05-03 RX ADMIN — PIPERACILLIN AND TAZOBACTAM 3.38 G: 3; .375 INJECTION, POWDER, LYOPHILIZED, FOR SOLUTION INTRAVENOUS at 05:25

## 2021-05-03 RX ADMIN — POTASSIUM CHLORIDE 20 MEQ: 1500 TABLET, EXTENDED RELEASE ORAL at 17:08

## 2021-05-03 RX ADMIN — METOPROLOL SUCCINATE 50 MG: 50 TABLET, EXTENDED RELEASE ORAL at 08:30

## 2021-05-03 RX ADMIN — MONTELUKAST SODIUM 10 MG: 10 TABLET, FILM COATED ORAL at 22:47

## 2021-05-03 RX ADMIN — INSULIN GLARGINE 10 UNITS: 100 INJECTION, SOLUTION SUBCUTANEOUS at 22:47

## 2021-05-03 RX ADMIN — TRAMADOL HYDROCHLORIDE 50 MG: 50 TABLET, FILM COATED ORAL at 05:30

## 2021-05-03 RX ADMIN — ACETAMINOPHEN 650 MG: 325 TABLET, FILM COATED ORAL at 19:39

## 2021-05-03 RX ADMIN — LORATADINE 10 MG: 10 TABLET ORAL at 08:31

## 2021-05-03 RX ADMIN — APIXABAN 5 MG: 5 TABLET, FILM COATED ORAL at 08:30

## 2021-05-03 NOTE — ASSESSMENT & PLAN NOTE
Lab Results   Component Value Date    HGBA1C 8 4 (A) 10/28/2020       Recent Labs     05/02/21 2044 05/03/21  0742   POCGLU 201* 224*       Blood Sugar Average: Last 72 hrs:  (P) 212 5 patient is on Amaryl and also metformin as an outpatient    Last hemoglobin A1c of 8 4  Home meds held  On 10iu lantus hs - titrate based on accucheks  Insulin sliding scale coverage  Diabetic clear liquid

## 2021-05-03 NOTE — UTILIZATION REVIEW
Inpatient Admission Authorization Request   NOTIFICATION OF INPATIENT ADMISSION/INPATIENT AUTHORIZATION REQUEST   SERVICING FACILITY:   [facility]  [address] [place] [address]  Tax ID: [id number]  NPI: [id number]  Place of Service: Inpatient [address]  60[address]  Place of Service Code: 24     ATTENDING PROVIDER:  Attending Name and NPI#: Jose Francisco Ba, [place] [[id number]]  Address: [address] [place], [place] [address]  Phone: [phone]     UTILIZATION REVIEW CONTACT:  Quan Cage Utilization   Network Utilization Review Department  Phone: [phone]  Fax [phone]  Email: [email or web address]     PHYSICIAN ADVISORY SERVICES:  FOR [id number] REVIEW - MEDICAL NECESSITY DENIAL  Phone: [phone]  Fax: [phone]  Email: [email or web address]     TYPE OF REQUEST:  Inpatient Status     ADMISSION INFORMATION:  ADMISSION DATE/TIME: 5/2/21 1617  PATIENT DIAGNOSIS CODE/DESCRIPTION:  Diverticulitis [K57 92]  Abdominal pain [R10 9]  Severe sepsis ([facility] [address] ) [A41 9, R65 20]  DISCHARGE DATE/TIME: No discharge date for patient encounter  DISCHARGE DISPOSITION (IF DISCHARGED): Home/Self Care     IMPORTANT INFORMATION:  Please contact the Janna [facility] directly with any questions or concerns regarding this request  Department voicemails are confidential     Send requests for admission clinical reviews, concurrent reviews, approvals, and administrative denials due to lack of clinical to fax [phone]

## 2021-05-03 NOTE — PLAN OF CARE
Problem: Potential for Falls  Goal: Patient will remain free of falls  Description: INTERVENTIONS:  - Assess patient frequently for physical needs  -  Identify cognitive and physical deficits and behaviors that affect risk of falls  -  [place] fall precautions as indicated by assessment   - Educate patient/family on patient safety including physical limitations  - Instruct patient to call for assistance with activity based on assessment  - Modify environment to reduce risk of injury  - Consider OT/PT consult to assist with strengthening/mobility  Outcome: Progressing     Problem: Nutrition/Hydration-ADULT  Goal: Nutrient/Hydration intake appropriate for improving, restoring or maintaining nutritional needs  Description: Monitor and assess patient's nutrition/hydration status for malnutrition  Collaborate with interdisciplinary team and initiate plan and interventions as ordered  Monitor patient's weight and dietary intake as ordered or per policy  Utilize nutrition screening tool and intervene as necessary  Determine patient's food preferences and provide high-protein, high-caloric foods as appropriate       INTERVENTIONS:  - Monitor oral intake, urinary output, labs, and treatment plans  - Assess nutrition and hydration status and recommend course of action  - Evaluate amount of meals eaten  - Assist patient with eating if necessary   - Allow adequate time for meals  - Recommend/ encourage appropriate diets, oral nutritional supplements, and vitamin/mineral supplements  - Order, calculate, and assess calorie counts as needed  - Recommend, monitor, and adjust tube feedings and TPN/PPN based on assessed needs  - Assess need for intravenous fluids  - Provide specific nutrition/hydration education as appropriate  - Include patient/family/caregiver in decisions related to nutrition  Outcome: Progressing     Problem: PAIN - ADULT  Goal: Verbalizes/displays adequate comfort level or baseline comfort level  Description: Interventions:  - Encourage patient to monitor pain and request assistance  - Assess pain using appropriate pain scale  - Administer analgesics based on type and severity of pain and evaluate response  - Implement non-pharmacological measures as appropriate and evaluate response  - Consider cultural and social influences on pain and pain management  - Notify physician/advanced practitioner if interventions unsuccessful or patient reports new pain  Outcome: Progressing     Problem: INFECTION - ADULT  Goal: Absence or prevention of progression during hospitalization  Description: INTERVENTIONS:  - Assess and monitor for signs and symptoms of infection  - Monitor lab/diagnostic results  - Monitor all insertion sites, i e  indwelling lines, tubes, and drains  - Monitor endotracheal if appropriate and nasal secretions for changes in amount and color  - [place] appropriate cooling/warming therapies per order  - Administer medications as ordered  - Instruct and encourage patient and family to use good hand hygiene technique  - Identify and instruct in appropriate isolation precautions for identified infection/condition  Outcome: Progressing     Problem: SAFETY ADULT  Goal: Patient will remain free of falls  Description: INTERVENTIONS:  - Assess patient frequently for physical needs  -  Identify cognitive and physical deficits and behaviors that affect risk of falls    -  [place] fall precautions as indicated by assessment   - Educate patient/family on patient safety including physical limitations  - Instruct patient to call for assistance with activity based on assessment  - Modify environment to reduce risk of injury  - Consider OT/PT consult to assist with strengthening/mobility  Outcome: Progressing  Goal: Maintain or return to baseline ADL function  Description: INTERVENTIONS:  -  Assess patient's ability to carry out ADLs; assess patient's baseline for ADL function and identify physical deficits which impact ability to perform ADLs (bathing, care of mouth/teeth, toileting, grooming, dressing, etc )  - Assess/evaluate cause of self-care deficits   - Assess range of motion  - Assess patient's mobility; develop plan if impaired  - Assess patient's need for assistive devices and provide as appropriate  - Encourage maximum independence but intervene and supervise when necessary  - Involve family in performance of ADLs  - Assess for home care needs following discharge   - Consider OT consult to assist with ADL evaluation and planning for discharge  - Provide patient education as appropriate  Outcome: Progressing     Problem: DISCHARGE PLANNING  Goal: Discharge to home or other facility with appropriate resources  Description: INTERVENTIONS:  - Identify barriers to discharge w/patient and caregiver  - Arrange for needed discharge resources and transportation as appropriate  - Identify discharge learning needs (meds, wound care, etc )  - Arrange for interpretive services to assist at discharge as needed  - Refer to Case Management Department for coordinating discharge planning if the patient needs post-hospital services based on physician/advanced practitioner order or complex needs related to functional status, cognitive ability, or social support system  Outcome: Progressing     Problem: Knowledge Deficit  Goal: Patient/family/caregiver demonstrates understanding of disease process, treatment plan, medications, and discharge instructions  Description: Complete learning assessment and assess knowledge base    Interventions:  - Provide teaching at level of understanding  - Provide teaching via preferred learning methods  Outcome: Progressing     Problem: RESPIRATORY - ADULT  Goal: Achieves optimal ventilation and oxygenation  Description: INTERVENTIONS:  - Assess for changes in respiratory status  - Assess for changes in mentation and behavior  - Position to facilitate oxygenation and minimize respiratory effort  - Oxygen administered by appropriate delivery if ordered  - Initiate smoking cessation education as indicated  - Encourage broncho-pulmonary hygiene including cough, deep breathe, Incentive Spirometry  - Assess the need for suctioning and aspirate as needed  - Assess and instruct to report SOB or any respiratory difficulty  - Respiratory Therapy support as indicated  Outcome: Progressing     Problem: GASTROINTESTINAL - ADULT  Goal: Minimal or absence of nausea and/or vomiting  Description: INTERVENTIONS:  - Administer IV fluids if ordered to ensure adequate hydration  - Maintain NPO status until nausea and vomiting are resolved  - Nasogastric tube if ordered  - Administer ordered antiemetic medications as needed  - Provide nonpharmacologic comfort measures as appropriate  - Advance diet as tolerated, if ordered  - Consider nutrition services referral to assist patient with adequate nutrition and appropriate food choices  Outcome: Progressing  Goal: Maintains or returns to baseline bowel function  Description: INTERVENTIONS:  - Assess bowel function  - Encourage oral fluids to ensure adequate hydration  - Administer IV fluids if ordered to ensure adequate hydration  - Administer ordered medications as needed  - Encourage mobilization and activity  - Consider nutritional services referral to assist patient with adequate nutrition and appropriate food choices  Outcome: Progressing  Goal: Maintains adequate nutritional intake  Description: INTERVENTIONS:  - Monitor percentage of each meal consumed  - Identify factors contributing to decreased intake, treat as appropriate  - Assist with meals as needed  - Monitor I&O, weight, and lab values if indicated  - Obtain nutrition services referral as needed  Outcome: Progressing     Problem: METABOLIC, FLUID AND ELECTROLYTES - ADULT  Goal: Electrolytes maintained within normal limits  Description: INTERVENTIONS:  - Monitor labs and assess patient for signs and symptoms of electrolyte imbalances  - Administer electrolyte replacement as ordered  - Monitor response to electrolyte replacements, including repeat lab results as appropriate  - Instruct patient on fluid and nutrition as appropriate  Outcome: Progressing     Problem: MUSCULOSKELETAL - ADULT  Goal: Maintain or return mobility to safest level of function  Description: INTERVENTIONS:  - Assess patient's ability to carry out ADLs; assess patient's baseline for ADL function and identify physical deficits which impact ability to perform ADLs (bathing, care of mouth/teeth, toileting, grooming, dressing, etc )  - Assess/evaluate cause of self-care deficits   - Assess range of motion  - Assess patient's mobility  - Assess patient's need for assistive devices and provide as appropriate  - Encourage maximum independence but intervene and supervise when necessary  - Involve family in performance of ADLs  - Assess for home care needs following discharge   - Consider OT consult to assist with ADL evaluation and planning for discharge  - Provide patient education as appropriate  Outcome: Progressing

## 2021-05-03 NOTE — PLAN OF CARE
Problem: Potential for Falls  Goal: Patient will remain free of falls  Description: INTERVENTIONS:  - Assess patient frequently for physical needs  -  Identify cognitive and physical deficits and behaviors that affect risk of falls  -  [place] fall precautions as indicated by assessment   - Educate patient/family on patient safety including physical limitations  - Instruct patient to call for assistance with activity based on assessment  - Modify environment to reduce risk of injury  - Consider OT/PT consult to assist with strengthening/mobility  Outcome: Progressing     Problem: Nutrition/Hydration-ADULT  Goal: Nutrient/Hydration intake appropriate for improving, restoring or maintaining nutritional needs  Description: Monitor and assess patient's nutrition/hydration status for malnutrition  Collaborate with interdisciplinary team and initiate plan and interventions as ordered  Monitor patient's weight and dietary intake as ordered or per policy  Utilize nutrition screening tool and intervene as necessary  Determine patient's food preferences and provide high-protein, high-caloric foods as appropriate       INTERVENTIONS:  - Monitor oral intake, urinary output, labs, and treatment plans  - Assess nutrition and hydration status and recommend course of action  - Evaluate amount of meals eaten  - Assist patient with eating if necessary   - Allow adequate time for meals  - Recommend/ encourage appropriate diets, oral nutritional supplements, and vitamin/mineral supplements  - Order, calculate, and assess calorie counts as needed  - Recommend, monitor, and adjust tube feedings and TPN/PPN based on assessed needs  - Assess need for intravenous fluids  - Provide specific nutrition/hydration education as appropriate  - Include patient/family/caregiver in decisions related to nutrition  Outcome: Progressing     Problem: PAIN - ADULT  Goal: Verbalizes/displays adequate comfort level or baseline comfort level  Description: Interventions:  - Encourage patient to monitor pain and request assistance  - Assess pain using appropriate pain scale  - Administer analgesics based on type and severity of pain and evaluate response  - Implement non-pharmacological measures as appropriate and evaluate response  - Consider cultural and social influences on pain and pain management  - Notify physician/advanced practitioner if interventions unsuccessful or patient reports new pain  Outcome: Progressing     Problem: INFECTION - ADULT  Goal: Absence or prevention of progression during hospitalization  Description: INTERVENTIONS:  - Assess and monitor for signs and symptoms of infection  - Monitor lab/diagnostic results  - Monitor all insertion sites, i e  indwelling lines, tubes, and drains  - Monitor endotracheal if appropriate and nasal secretions for changes in amount and color  - [place] appropriate cooling/warming therapies per order  - Administer medications as ordered  - Instruct and encourage patient and family to use good hand hygiene technique  - Identify and instruct in appropriate isolation precautions for identified infection/condition  Outcome: Progressing     Problem: SAFETY ADULT  Goal: Patient will remain free of falls  Description: INTERVENTIONS:  - Assess patient frequently for physical needs  -  Identify cognitive and physical deficits and behaviors that affect risk of falls    -  [place] fall precautions as indicated by assessment   - Educate patient/family on patient safety including physical limitations  - Instruct patient to call for assistance with activity based on assessment  - Modify environment to reduce risk of injury  - Consider OT/PT consult to assist with strengthening/mobility  Outcome: Progressing  Goal: Maintain or return to baseline ADL function  Description: INTERVENTIONS:  -  Assess patient's ability to carry out ADLs; assess patient's baseline for ADL function and identify physical deficits which impact ability to perform ADLs (bathing, care of mouth/teeth, toileting, grooming, dressing, etc )  - Assess/evaluate cause of self-care deficits   - Assess range of motion  - Assess patient's mobility; develop plan if impaired  - Assess patient's need for assistive devices and provide as appropriate  - Encourage maximum independence but intervene and supervise when necessary  - Involve family in performance of ADLs  - Assess for home care needs following discharge   - Consider OT consult to assist with ADL evaluation and planning for discharge  - Provide patient education as appropriate  Outcome: Progressing     Problem: DISCHARGE PLANNING  Goal: Discharge to home or other facility with appropriate resources  Description: INTERVENTIONS:  - Identify barriers to discharge w/patient and caregiver  - Arrange for needed discharge resources and transportation as appropriate  - Identify discharge learning needs (meds, wound care, etc )  - Arrange for interpretive services to assist at discharge as needed  - Refer to Case Management Department for coordinating discharge planning if the patient needs post-hospital services based on physician/advanced practitioner order or complex needs related to functional status, cognitive ability, or social support system  Outcome: Progressing     Problem: Knowledge Deficit  Goal: Patient/family/caregiver demonstrates understanding of disease process, treatment plan, medications, and discharge instructions  Description: Complete learning assessment and assess knowledge base    Interventions:  - Provide teaching at level of understanding  - Provide teaching via preferred learning methods  Outcome: Progressing     Problem: RESPIRATORY - ADULT  Goal: Achieves optimal ventilation and oxygenation  Description: INTERVENTIONS:  - Assess for changes in respiratory status  - Assess for changes in mentation and behavior  - Position to facilitate oxygenation and minimize respiratory effort  - Oxygen administered by appropriate delivery if ordered  - Initiate smoking cessation education as indicated  - Encourage broncho-pulmonary hygiene including cough, deep breathe, Incentive Spirometry  - Assess the need for suctioning and aspirate as needed  - Assess and instruct to report SOB or any respiratory difficulty  - Respiratory Therapy support as indicated  Outcome: Progressing     Problem: GASTROINTESTINAL - ADULT  Goal: Minimal or absence of nausea and/or vomiting  Description: INTERVENTIONS:  - Administer IV fluids if ordered to ensure adequate hydration  - Maintain NPO status until nausea and vomiting are resolved  - Nasogastric tube if ordered  - Administer ordered antiemetic medications as needed  - Provide nonpharmacologic comfort measures as appropriate  - Advance diet as tolerated, if ordered  - Consider nutrition services referral to assist patient with adequate nutrition and appropriate food choices  Outcome: Progressing  Goal: Maintains or returns to baseline bowel function  Description: INTERVENTIONS:  - Assess bowel function  - Encourage oral fluids to ensure adequate hydration  - Administer IV fluids if ordered to ensure adequate hydration  - Administer ordered medications as needed  - Encourage mobilization and activity  - Consider nutritional services referral to assist patient with adequate nutrition and appropriate food choices  Outcome: Progressing  Goal: Maintains adequate nutritional intake  Description: INTERVENTIONS:  - Monitor percentage of each meal consumed  - Identify factors contributing to decreased intake, treat as appropriate  - Assist with meals as needed  - Monitor I&O, weight, and lab values if indicated  - Obtain nutrition services referral as needed  Outcome: Progressing     Problem: METABOLIC, FLUID AND ELECTROLYTES - ADULT  Goal: Electrolytes maintained within normal limits  Description: INTERVENTIONS:  - Monitor labs and assess patient for signs and symptoms of electrolyte imbalances  - Administer electrolyte replacement as ordered  - Monitor response to electrolyte replacements, including repeat lab results as appropriate  - Instruct patient on fluid and nutrition as appropriate  Outcome: Progressing     Problem: MUSCULOSKELETAL - ADULT  Goal: Maintain or return mobility to safest level of function  Description: INTERVENTIONS:  - Assess patient's ability to carry out ADLs; assess patient's baseline for ADL function and identify physical deficits which impact ability to perform ADLs (bathing, care of mouth/teeth, toileting, grooming, dressing, etc )  - Assess/evaluate cause of self-care deficits   - Assess range of motion  - Assess patient's mobility  - Assess patient's need for assistive devices and provide as appropriate  - Encourage maximum independence but intervene and supervise when necessary  - Involve family in performance of ADLs  - Assess for home care needs following discharge   - Consider OT consult to assist with ADL evaluation and planning for discharge  - Provide patient education as appropriate  Outcome: Progressing

## 2021-05-03 NOTE — ASSESSMENT & PLAN NOTE
· Chronic resp failure on 4L NC in the setting of WILMA, asthma history  · Maintain Os sats > 91% while inpatient  · Continue with CPAP at night

## 2021-05-03 NOTE — ASSESSMENT & PLAN NOTE
· SIRS 1/4 leucocytosis 16k, did not meet sepsis criteria  · Also with lactic acidosis  · S/p IVFs - lactic acidosis resolved  · On empiric antibiotics   · Follow blood cultures

## 2021-05-03 NOTE — CASE MANAGEMENT
LOS: 1 day  PATIENT IS NOT A MEDICARE BUNDLE OR A 30 DAY READMISSION  UNPLANNED READMISSION RISK SCORE IS 26 - GREEN  Met with patient  Explained role of care management  Patient lives alone in a ranch style home with 2 NICOLA in front if she drives up to the front door and 10-12 NICOLA in the back  She is independent adl's and ambulation, drives, has applied for SS disability  DME - CPAP, O2 @ 3l nc, supplied by [employer]  Past services - [facility], denies history of  SNF, [address] or [facility]  Pharmacy of choice is [facility]  She has a prescription plan and is able to afford her medication  She does not have a POA/AD and is not interested in information  Her PCP is Laith Olivares  She states her brother would help at home if needed  Her daughter lives 3 hours away, son lives in [facility], but is busy with his business  She plans on returning home at discharge and is agreeable to [facility]  Given freedom of choice and list   Referral to [facility]  She drove herself to the hospital and plans on driving home  CM reviewed d/c planning process including the following: identifying help at home, patient preference for d/c planning needs, availability of treatment team to discuss questions or concerns patient and/or family may have regarding understanding medications and recognizing signs and symptoms once discharged  CM also encouraged patient to follow up with all recommended appointments after discharge  Patient advised of importance for patient and family to participate in managing patients medical well being  Will follow

## 2021-05-03 NOTE — PROGRESS NOTES
[place]  Progress Note - Cayla Bishop 1960, 56 y o  female MRN: [id number]  Unit/Bed#: [facility] [place] Encounter: [id number]  Primary Care Provider: Alondra Unger MD   Date and time admitted to hospital: 5/2/2021  1:59 PM    * Acute diverticulitis  Assessment & Plan  · Acute diverticulitis, poa, a/e/b lower abdominal pain for 2 days and CT scan findings of acute sigmoid diverticulitis  · On empiric treatment with Zosyn  · Clear liquid diet for now, advance as tolerated  · Zofran prn for N/V  · GI on board  · Patient reported that she had colonoscopy in the past and had polyps removed  Patient was supposed to have a repeat colonoscopy but was lost to follow up  · To have OP colonoscopy within 6 weeks post diverticulitis    Gastric polyp  Assessment & Plan  · Noted to have gastric polyp on CT  · GI consulted  · May need eventual EGD     Class 3 severe obesity in adult [facility])  Assessment & Plan  · TLC as outpatient    Sepsis ([facility] [address] )  Assessment & Plan  · SIRS 1/4 leucocytosis 16k, did not meet sepsis criteria  · Also with lactic acidosis  · S/p IVFs - lactic acidosis resolved  · On empiric antibiotics   · Follow blood cultures    Atrial fibrillation (HCC)  Assessment & Plan  · Heart rate controlled  · Continue with Eliquis    Chronic respiratory failure with hypoxia (HCC)  Assessment & Plan  · Chronic resp failure on 4L NC in the setting of WILMA, asthma history  · Maintain Os sats > 91% while inpatient  · Continue with CPAP at night      Type 2 diabetes mellitus, without long-term current use of insulin [facility])  Assessment & Plan  Lab Results   Component Value Date    HGBA1C 8 4 (A) 10/28/2020       Recent Labs     05/02/21  2044 05/03/21  0742   POCGLU 201* 224*       Blood Sugar Average: Last 72 hrs:  (P) 212 5 patient is on Amaryl and also metformin as an outpatient    Last hemoglobin A1c of 8 4  Home meds held  On 10iu lantus hs - titrate based on accucheks  Insulin sliding scale coverage  Diabetic clear liquid    Hypercholesterolemia  Assessment & Plan  · Continue statin    Obstructive sleep apnea  Assessment & Plan  · Continue CPAP q h s  Benign essential hypertension  Assessment & Plan  · Monitor blood pressure  · Continue with outpatient medication    Lactic acidosis-resolved as of 5/3/2021  Assessment & Plan  · Resolved post IVFs        VTE Pharmacologic Prophylaxis:   Pharmacologic: Apixaban (Eliquis)  Mechanical VTE Prophylaxis in Place: Yes    Patient Centered Rounds: I have performed bedside rounds with nursing staff today  Discussions with Specialists or Other Care Team Provider:     Education and Discussions with Family / Patient: patient opted to update her family herself    Time Spent for Care: 30 minutes  More than 50% of total time spent on counseling and coordination of care as described above  Current Length of Stay: 1 day(s)    Current Patient Status: Inpatient   Certification Statement: The patient will continue to require additional inpatient hospital stay due to as above    Discharge Plan: 1-2 days    Code Status: Level 1 - Full Code      Subjective:   No overnight events, complained of poor sleep with constant alarms and movement in and out of her room  This am, complained of continued lower abd pain, improved slightly    Objective:     Vitals:   Temp (24hrs), Av 1 °F (36 7 °C), Min:97 8 °F (36 6 °C), Max:98 4 °F (36 9 °C)    Temp:  [97 8 °F (36 6 °C)-98 4 °F (36 9 °C)] 98 4 °F (36 9 °C)  HR:  [68-90] 80  Resp:  [16-22] 22  BP: (102-126)/(51-69) 102/62  SpO2:  [90 %-100 %] 93 %  Body mass index is 50 56 kg/m²  Input and Output Summary (last 24 hours): Intake/Output Summary (Last 24 hours) at 5/3/2021 1051  Last data filed at 5/3/2021 0843  Gross per 24 hour   Intake 2460 ml   Output 1150 ml   Net 1310 ml       Physical Exam:     Physical Exam  Vitals signs and nursing note reviewed  Constitutional:       General: She is not in acute distress  Appearance: Normal appearance  She is not ill-appearing, toxic-appearing or diaphoretic  Cardiovascular:      Rate and Rhythm: Normal rate and regular rhythm  Pulses: Normal pulses  Heart sounds: No murmur  No gallop  Pulmonary:      Effort: Pulmonary effort is normal  No respiratory distress  Breath sounds: Normal breath sounds  No stridor  No wheezing, rhonchi or rales  Chest:      Chest wall: No tenderness  Abdominal:      General: Bowel sounds are normal  There is no distension  Palpations: Abdomen is soft  Tenderness: There is abdominal tenderness  There is no right CVA tenderness, left CVA tenderness or guarding  Musculoskeletal: Normal range of motion  General: No swelling, tenderness, deformity or signs of injury  Right lower leg: No edema  Left lower leg: No edema  Skin:     General: Skin is warm and dry  Capillary Refill: Capillary refill takes less than 2 seconds  Coloration: Skin is not jaundiced or pale  Findings: No bruising, erythema, lesion or rash  Neurological:      General: No focal deficit present  Mental Status: She is alert  Mental status is at baseline  Cranial Nerves: No cranial nerve deficit  Psychiatric:         Mood and Affect: Mood normal          Thought Content:  Thought content normal          Judgment: Judgment normal          Additional Data:     Labs:    Results from last 7 days   Lab Units 05/02/21  1422   WBC Thousand/uL 16 19*   HEMOGLOBIN g/dL 10 9*   HEMATOCRIT % 36 3   PLATELETS Thousands/uL 316   NEUTROS PCT % 85*   LYMPHS PCT % 7*   MONOS PCT % 6   EOS PCT % 1     Results from last 7 days   Lab Units 05/03/21  0221   SODIUM mmol/L 139   POTASSIUM mmol/L 3 8   CHLORIDE mmol/L 100   CO2 mmol/L 34*   BUN mg/dL 7   CREATININE mg/dL 0 68   ANION GAP mmol/L 5   CALCIUM mg/dL 8 5   ALBUMIN g/dL 2 9*   TOTAL BILIRUBIN mg/dL 0 50   ALK PHOS U/L 157*   ALT U/L 27   AST U/L 17   GLUCOSE RANDOM mg/dL 153*         Results from last 7 days   Lab Units 05/03/21  0742 05/02/21  2044   POC GLUCOSE mg/dl 224* 201*         Results from last 7 days   Lab Units 05/03/21  0221 05/02/21  2207 05/02/21  1858 05/02/21  1638   LACTIC ACID mmol/L 1 3 2 4* 2 3* 2 7*           * I Have Reviewed All Lab Data Listed Above  * Additional Pertinent Lab Tests Reviewed: All Labs Within Last 24 Hours Reviewed    Imaging:    Imaging Reports Reviewed Today Include:   Imaging Personally Reviewed by Myself Includes:      Recent Cultures (last 7 days):     Results from last 7 days   Lab Units 05/02/21  1459   BLOOD CULTURE  Received in Microbiology Lab  Culture in Progress  Received in Microbiology Lab  Culture in Progress         Last 24 Hours Medication List:   Current Facility-Administered Medications   Medication Dose Route Frequency Provider Last Rate    acetaminophen  650 mg Oral Q6H PRN Marcelino Nino MD      albuterol  2 puff Inhalation Q4H PRN Marcelino Nino MD      aluminum-magnesium hydroxide-simethicone  30 mL Oral Q6H PRN Marcelino Nino MD      apixaban  5 mg Oral BID Marcelino Nino MD      ascorbic acid  1,000 mg Oral Daily Marcelino Nino MD      atorvastatin  40 mg Oral Daily With Ananda Laws MD      cholecalciferol  1,000 Units Oral Daily Marcelino Nino MD      citalopram  20 mg Oral Daily Marcelino Nino MD      cyanocobalamin  100 mcg Oral Daily Marcelino Nino MD      flecainide  100 mg Oral Q12H [facility] Marcelino Nino MD      fluticasone  1 spray Nasal BID Marcelino Nino MD      fluticasone  2 puff Inhalation BID Marcelino Nino MD      HYDROmorphone  0 5 mg Intravenous Q4H PRN Armand Collet, PA-C      insulin glargine  10 Units Subcutaneous HS Marcelino Nino MD      insulin lispro  1-6 Units Subcutaneous HS Marcelino Nino MD      insulin lispro  2-12 Units Subcutaneous TID [facility] Marcelino Nino MD      levalbuterol  1 25 mg Nebulization BID Marcelino Nino MD      levalbuterol  1 25 mg Nebulization Q8H PRN Merary Willis MD      loratadine  10 mg Oral Daily Merary Willis, MD      LORazepam  0 5 mg Oral HS Merary Willis, MD      magnesium oxide  400 mg Oral Daily Merary Willis, MD      metoprolol succinate  50 mg Oral Daily Merary Willis, MD      montelukast  10 mg Oral HS Merary Willis, MD      pantoprazole  40 mg Oral Early Morning Merary Willis MD      piperacillin-tazobactam  3 375 g Intravenous Q6H Merary Willis MD 0 g (05/03/21 0601)    potassium chloride  20 mEq Oral BID Merary Willis MD      simethicone  80 mg Oral 4x Daily PRN Merary Willis MD      torsemide  40 mg Oral BID Dolly Steele MD      traMADol  50 mg Oral Q6H PRN Maria Victoria Martinez PA-C      traZODone  150 mg Oral HS Merary Willis MD          Today, Patient Was Seen By: Dolly Steele MD    ** Please Note: Dictation voice to text software may have been used in the creation of this document   **

## 2021-05-03 NOTE — ASSESSMENT & PLAN NOTE
· Acute diverticulitis, poa, a/e/b lower abdominal pain for 2 days and CT scan findings of acute sigmoid diverticulitis  · On empiric treatment with Zosyn  · Clear liquid diet for now, advance as tolerated  · Zofran prn for N/V  · GI on board  · Patient reported that she had colonoscopy in the past and had polyps removed    Patient was supposed to have a repeat colonoscopy but was lost to follow up  · To have OP colonoscopy within 6 weeks post diverticulitis

## 2021-05-03 NOTE — CONSULTS
Consultation - [address] Gastroenterology     Pedro Luis Villeda 56 y o  female MRN: [id number]  Unit/Bed#: -01 Encounter: [id number]    Consults    ASSESSMENT and PLAN    Acute sigmoid diverticulitis, uncomplicated - sudden onset of lower abdominal pain with CT scan consistent with acute diverticulitis  No evidence of perforation or abscess  First episode  Patient does have a history of colonic polyps but no other colonic disorders  No evidence of colonic mass or obstruction  · Agree with IV antibiotics  · Would limit to clear liquids for now  · Gradual advancement of diet as patient clinically improves  · Will plan colonoscopy in 6-8 weeks    History of colonic polyps - patient states she is due for follow-up exam     · Plan colonoscopy as above after recovery from diverticulitis    Gastric polyps on CT - most likely benign fundic gland polyps  · Will plan EGD at the time of her colonoscopy to assess      Chief Complaint   Patient presents with    Abdominal Pain     pt reports right lower abdominal pain that started yesterday, associated with some loose bowels  Physician Requesting Consult: Morgan Chun MD    [facility] in [place]  Pedro Luis Villeda is a 56y o  year old female with 2 days of severe lower abdominal pain right-sided greater than left-sided  Chills but no fever  No nausea or vomiting  Stools looser but no melena or hematochezia  Pain was sudden onset  Sharp  No relief with bowel movements  No new medications, change in diet, activity, no ill contacts  With worsening pain came to emergency room and CT scan revealed sigmoid diverticulitis  Patient does have a history of colonic polyps  Last colonoscopy several years ago  Due for follow-up      Historical Information   Past Medical History:   Diagnosis Date    Alcohol abuse 5/21/2020    Alcohol abuse 5/21/2020    Anemia     Atrial fibrillation (HCC)     Cervical radiculopathy     CHF (congestive heart failure) (HCC)     Chronic low back pain     Chronic obstructive asthma ([facility] ) 2018    Community acquired pneumonia     Community acquired pneumonia 2020    Diabetes mellitus ([facility] )     Diabetes mellitus with hyperglycemia (HCC)     Elevated liver enzymes     GERD (gastroesophageal reflux disease)     Hypersomnolence 10/28/2020    Hypokalemia 2018    Paresthesia of upper extremity     Plantar fasciitis     Restless leg     Sexual dysfunction     Sleep apnea, obstructive     Tenosynovitis of finger     Tinea corporis     Weakness of upper extremity      Past Surgical History:   Procedure Laterality Date    ABDOMINAL SURGERY      CARPAL TUNNEL RELEASE Bilateral      SECTION      CHOLECYSTECTOMY      laparoscopic    ESOPHAGOGASTRODUODENOSCOPY N/A 12/3/2018    Procedure: ESOPHAGOGASTRODUODENOSCOPY (EGD) AND COLONOSCOPY;  Surgeon: Alicia Coronel MD;  [facility];  Service: Gastroenterology    GASTRIC BYPASS      for morbid obesity with gilda en y    HERNIA REPAIR      HYSTERECTOMY      IN EXCIS PRIMARY GANGLION WRIST Left 2017    Procedure: LONG FINGER GANGLION CYST EXCISION;  Surgeon: Christiano Turcios MD;  [facility];  Service: Orthopedics    IN INCISE FINGER TENDON SHEATH Left 2017    Procedure: THUMB, LONG, RING, TRIGGER FINGER RELEASE;  Surgeon: Christiano Turcios MD;  [facility];  Service: Orthopedics    SHOULDER SURGERY       Social History   Social History     Substance and Sexual Activity   Alcohol Use Not Currently    Alcohol/week: 20 0 standard drinks    Types: 20 Cans of beer per week    Frequency: 4 or more times a week    Drinks per session: 3 or 4    Binge frequency: Never    Comment: about 6 coors light every night, stopped 3 weeks ago      Social History     Substance and Sexual Activity   Drug Use No     Social History     Tobacco Use   Smoking Status Former Smoker    Packs/day: 0 25    Years: 29 00    Pack years: 7 25    Types: Cigarettes    Start date: 200    Quit date: 12/10/2019    Years since quittin 3   Smokeless Tobacco Never Used     Family History   Problem Relation Age of Onset    Alzheimer's disease Mother     Atrial fibrillation Mother     Dementia Mother     Heart disease Mother         heart problem    Seizures Mother     Parkinsonism Father     Arthritis Father     Atrial fibrillation Father     Substance Abuse Neg Hx         mother,father    Mental illness Neg Hx         mother,father       Meds/Allergies     Current Facility-Administered Medications   Medication Dose Route Frequency    acetaminophen (TYLENOL) tablet 650 mg  650 mg Oral Q6H PRN    albuterol (PROVENTIL HFA,VENTOLIN HFA) inhaler 2 puff  2 puff Inhalation Q4H PRN    aluminum-magnesium hydroxide-simethicone (MYLANTA) oral suspension 30 mL  30 mL Oral Q6H PRN    apixaban (ELIQUIS) tablet 5 mg  5 mg Oral BID    ascorbic acid (VITAMIN C) tablet 1,000 mg  1,000 mg Oral Daily    atorvastatin (LIPITOR) tablet 40 mg  40 mg Oral Daily With Dinner    cholecalciferol (VITAMIN D3) tablet 1,000 Units  1,000 Units Oral Daily    citalopram (CeleXA) tablet 20 mg  20 mg Oral Daily    cyanocobalamin (VITAMIN B-12) tablet 100 mcg  100 mcg Oral Daily    flecainide (TAMBOCOR) tablet 100 mg  100 mg Oral Q12H [facility]    fluticasone (FLONASE) 50 mcg/act nasal spray 1 spray  1 spray Nasal BID    fluticasone (FLOVENT HFA) 220 mcg/act inhaler 2 puff  2 puff Inhalation BID    HYDROmorphone (DILAUDID) injection 0 5 mg  0 5 mg Intravenous Q4H PRN    insulin glargine (LANTUS) subcutaneous injection 10 Units 0 1 mL  10 Units Subcutaneous HS    insulin lispro (HumaLOG) 100 units/mL subcutaneous injection 1-6 Units  1-6 Units Subcutaneous HS    insulin lispro (HumaLOG) 100 units/mL subcutaneous injection 2-12 Units  2-12 Units Subcutaneous TID AC    levalbuterol (XOPENEX) inhalation solution 1 25 mg  1 25 mg Nebulization BID    levalbuterol ([place]) inhalation solution 1 25 mg  1 25 mg Nebulization Q8H PRN    loratadine (CLARITIN) tablet 10 mg  10 mg Oral Daily    LORazepam (ATIVAN) tablet 0 5 mg  0 5 mg Oral HS    magnesium oxide (MAG-OX) tablet 400 mg  400 mg Oral Daily    metoprolol succinate (TOPROL-XL) 24 hr tablet 50 mg  50 mg Oral Daily    montelukast (SINGULAIR) tablet 10 mg  10 mg Oral HS    pantoprazole (PROTONIX) EC tablet 40 mg  40 mg Oral Early Morning    piperacillin-tazobactam (ZOSYN) IVPB 3 375 g  3 375 g Intravenous Q6H    potassium chloride (K-DUR,KLOR-CON) CR tablet 20 mEq  20 mEq Oral BID    simethicone (MYLICON) chewable tablet 80 mg  80 mg Oral 4x Daily PRN    torsemide (DEMADEX) tablet 40 mg  40 mg Oral BID    traMADol (ULTRAM) tablet 50 mg  50 mg Oral Q6H PRN    traZODone (DESYREL) tablet 150 mg  150 mg Oral HS     Medications Prior to Admission   Medication    albuterol (PROVENTIL HFA,VENTOLIN HFA) 90 mcg/act inhaler    apixaban (Eliquis) 5 mg    ascorbic acid (VITAMIN C) 1000 MG tablet    atorvastatin (LIPITOR) 40 mg tablet    Blood Glucose Monitoring Suppl ([employer] CONTOUR NEXT MONITOR) w/Device KIT    Cholecalciferol 1000 units tablet    citalopram (CeleXA) 20 mg tablet    cyanocobalamin (VITAMIN B-12) 100 mcg tablet    ferrous sulfate 324 (65 Fe) mg    flecainide (TAMBOCOR) 100 mg tablet    fluticasone (FLONASE) 50 mcg/act nasal spray    fluticasone (FLOVENT HFA) 220 mcg/act inhaler    glimepiride (AMARYL) 4 mg tablet    glucose blood (CONTOUR NEXT TEST) test strip    glycopyrrolate-formoterol (BEVESPI AEROSPHERE) 9-4 8 MCG/ACT inhaler    levalbuterol (XOPENEX) 1 25 mg/0 5 mL nebulizer solution    [employer] UNISTIK II LANCETS MISC    loratadine (Claritin) 10 mg tablet    LORazepam (ATIVAN) 0 5 mg tablet    Magnesium 400 MG CAPS    metFORMIN (GLUCOPHAGE-XR) 500 mg 24 hr tablet    metoprolol succinate (TOPROL-XL) 50 mg 24 hr tablet    montelukast (SINGULAIR) 10 mg tablet    naloxone (NARCAN) 4 mg/0 1 mL nasal spray    omeprazole (PriLOSEC) 40 MG capsule    potassium chloride (K-DUR,KLOR-CON) 20 mEq tablet    torsemide (DEMADEX) 20 mg tablet    traZODone (DESYREL) 150 mg tablet       Allergies   Allergen Reactions    Iron Dextran Swelling    Januvia [Sitagliptin] Shortness Of Breath    Jardiance [Empagliflozin] Shortness Of Breath    Clonazepam Other (See Comments)     Unknown reaction    Codeine Itching and Other (See Comments)     itch;mary kay morphine,takes ultram @home    Adhesive [Medical Tape] Itching     itching    Effexor [Venlafaxine] Itching    Lactose - Food Allergy GI Intolerance    Oxycodone-Acetaminophen Anxiety    Oxycodone-Acetaminophen Itching and Rash     Other reaction(s): Other (See Comments)  (PERCOCET) MILD RASH, not allergic to Acetaminophen        PHYSICAL EXAM    Blood pressure 102/62, pulse 80, temperature 98 4 °F (36 9 °C), temperature source Oral, resp  rate 22, height 5' 3" (1 6 m), weight 129 kg (285 lb 6 4 oz), SpO2 97 %, not currently breastfeeding  Body mass index is 50 56 kg/m²  General Appearance: NAD, cooperative, alert  Eyes: Anicteric, PERRLA, EOMI  ENT:  Normocephalic, atraumatic, normal mucosa  Neck:  Supple, symmetrical, trachea midline  Resp:  Clear to auscultation bilaterally; no rales, rhonchi or wheezing; respirations unlabored   CV:  S1 S2, Regular rate and rhythm; no murmur, rub, or gallop  GI:  Soft, obese, decreased bowel sounds present, marked right lower quadrant greater than left lower quadrant tenderness without rebound or guarding  No mass appreciated  No organomegaly  Musculoskeletal: No cyanosis, clubbing or edema  Normal ROM    Skin:  No jaundice, rashes, or lesions   Heme/Lymph: No palpable cervical lymphadenopathy  Psych: Normal affect, good eye contact  Neuro: No gross deficits, AAOx3    Lab Results   Component Value Date    GLUCOSE 131 (H) 09/22/2017    CALCIUM 8 5 05/03/2021     09/22/2017    K 3 8 05/03/2021    CO2 34 (H) 05/03/2021     05/03/2021    BUN 7 05/03/2021    CREATININE 0 68 05/03/2021     Lab Results   Component Value Date    WBC 16 19 (H) 05/02/2021    HGB 10 9 (L) 05/02/2021    HCT 36 3 05/02/2021    MCV 72 (L) 05/02/2021     05/02/2021     Lab Results   Component Value Date    ALT 27 05/03/2021    AST 17 05/03/2021    ALKPHOS 157 (H) 05/03/2021    BILITOT 0 4 09/22/2017     Lab Results   Component Value Date    AMYLASE 31 03/09/2020    AMYLASE 12 (L) 02/14/2017     Lab Results   Component Value Date    LIPASE 73 03/12/2020     Lab Results   Component Value Date    IRON 21 (L) 05/22/2020    TIBC 538 (H) 05/22/2020    FERRITIN 8 05/22/2020     Lab Results   Component Value Date    INR 1 16 05/21/2020       Imaging Studies: I have personally reviewed pertinent reports  EKG, Pathology, and Other Studies: I have personally reviewed pertinent reports  REVIEW OF SYSTEMS:    CONSTITUTIONAL: Denies any fever, chills, rigors, and weight loss  HEENT: No earache or tinnitus  Denies hearing loss or visual disturbances  CARDIOVASCULAR: No chest pain or palpitations  RESPIRATORY: Denies any cough, hemoptysis, shortness of breath or dyspnea on exertion  GASTROINTESTINAL: As noted in the History of Present Illness  GENITOURINARY: No problems with urination  Denies any hematuria or dysuria  NEUROLOGIC: No dizziness or vertigo, denies headaches  MUSCULOSKELETAL: Denies any muscle or joint pain  SKIN: Denies skin rashes or itching  ENDOCRINE: Denies excessive thirst  Denies intolerance to heat or cold  PSYCHOSOCIAL: Denies depression or anxiety  Denies any recent memory loss

## 2021-05-03 NOTE — MALNUTRITION/BMI
This medical record reflects one or more clinical indicators suggestive of malnutrition and/or morbid obesity  BMI Findings:  Adult BMI Classifications: Morbid Obesity 50-59 9     Body mass index is 50 56 kg/m²  See Nutrition note dated 05/03/21 for additional details  Completed nutrition assessment is viewable in the nutrition documentation

## 2021-05-03 NOTE — UTILIZATION REVIEW
Initial Clinical Review    Admission: Date/Time/Statement:   Admission Orders (From admission, onward)     Ordered        05/02/21 1617  Inpatient Admission  Once                   Orders Placed This Encounter   Procedures    Inpatient Admission     Standing Status:   Standing     Number of Occurrences:   1     Order Specific Question:   Level of Care     Answer:   Med Surg [16]     Order Specific Question:   Estimated length of stay     Answer:   More than 2 Midnights     Order Specific Question:   Certification     Answer:   I certify that inpatient services are medically necessary for this patient for a duration of greater than two midnights  See H&P and MD Progress Notes for additional information about the patient's course of treatment  ED Arrival Information     Expected Arrival Acuity Means of Arrival Escorted By Service Admission Type    - 5/2/2021 13:56 Urgent Wheelchair Self General Medicine Urgent    Arrival Complaint    lower abd godfrey        Chief Complaint   Patient presents with    Abdominal Pain     pt reports right lower abdominal pain that started yesterday, associated with some loose bowels  Initial Presentation:    56year old female presents to ed from home for evaluation and treatment of abdominal pain with loose stool  On arrival she reports right lower quadrant abdominal pain  PMHX : ovarian cyst, alcohol abuse, CHF, AFIB, DM  Clinical assessment significant for lactic aid 3 0, WBC 16 19, CT shows acute diverticulitis at the mid sigmoid colon which extends to the right lower quadrant  Treated in ed for iv ns bolus, iv piperacillin  Admit to inpatient medical surgical for acute diverticulitis  Plan includes: clear liquid diet, iv piperacillin, follow up blood culture, consult gastroenterology, analgesia  Date: 5-3-21    Day 2: med surg     Continue clear liquid diet and continue iv antibiotics    Patient requires iv dilaudid prn for pain 8/10     Consult gastroenterology    Acute sigmoid diverticulitis, uncomplicated - sudden onset of lower abdominal pain with CT scan consistent with acute diverticulitis  No evidence of perforation or abscess  First episode  Patient does have a history of colonic polyps but no other colonic disorders  No evidence of colonic mass or obstruction  · Agree with IV antibiotics  · Would limit to clear liquids for now  · Gradual advancement of diet as patient clinically improves  · Will plan colonoscopy in 6-8 weeks     History of colonic polyps - patient states she is due for follow-up exam     · Plan colonoscopy as above after recovery from diverticulitis     Gastric polyps on CT - most likely benign fundic gland polyps    · Will plan EGD at the time of her colonoscopy to assess         ED Triage Vitals   05/02/21 1408 05/02/21 1408 05/02/21 1408 05/02/21 1409 05/02/21 1408   97 8 °F (36 6 °C) 90 20 116/69 90 %      Temporal Monitor         Pain Score       7          Wt Readings from Last 1 Encounters:   05/02/21 129 kg (285 lb 6 4 oz)     Additional Vital Signs:    Date/  Time  Temp  Pulse  Resp  BP   SpO2    Nasal Cannula O2 Flow Rate (L/min)  O2 Device  O2 Interface Device    05/03/21 0942  --  --  --  --   93 %    4 L/min  Nasal cannula  --    05/03/21 0912  --  --  --  102/62   --    --  --  --    05/03/21 0700  98 4 °F (36 9 °C)  80  22  107/53   97 %    --  --  --    05/03/21 0308  --  --  --  --   97 %    --  --  Face mask    05/03/21 0255  --  68  --  117/51   --    --  --  --    05/02/21 2300  98 1 °F (36 7 °C)  69  16  109/68   100 %    --  CPAP  --    05/02/21 2238  --  --  --  --   99 %    4 L/min  CPAP  Face mask    05/02/21 2018  --  --  --  --   --    4 L/min  Nasal cannula  --    05/02/21 2017  --  --  --  --   97 %    4 L/min  Nasal cannula  --    05/02/21 1843  98 2 °F (36 8 °C)  88  18  126/63   97 %    4 L/min  Nasal cannula  --    05/02/21 1600  --  80  20  119/69   97 %    4 L/min  Nasal cannula  -- [address] [facility]  [address] Med Surg Unit    05/02 0700 - 05/03 5433 05/03    Time:  8819 3748 2028 4297 0252 0255 0530 0830 0839          Pain Score   7  8  8 8 8 8     Pain Location/Orientation   [place]    [place]    [place]    Jar[place] Ring    Pain Onset/Description   Onset:    Onset:    Patient's Stated Pain Goal   No pain            Hospital Pain Intervention(s)   Medica    Medica    Medica    Medica    Acetaminophen (mg)  975  650           HYDROmorphone IJ (mg)      0 5    0 5     TraMADol (mg)     50   50          [id number]==^[id number]uxx0ayCYJ/mAsFJQ=^RhzAffLiOjDMsxD67mChOB==^Eprivate([id number])^[id number]^9^[id number]/0/kuQ==^           Pertinent Labs/Diagnostic Test Results:     CT Abdomen pelvis with contrast    (05/02 1555)   1  Findings compatible with acute diverticulitis at the redundant mid sigmoid colon which extends into the right lower quadrant  Normal appendix  2   Two polyps suggested along the greater curvature of the gastric body measuring up to 9 mm in size  GI consultation advised                 Results from last 7 days   Lab Units 05/02/21  1422   WBC Thousand/uL 16 19*   HEMOGLOBIN g/dL 10 9*   HEMATOCRIT % 36 3   PLATELETS Thousands/uL 316   NEUTROS ABS Thousands/µL 13 79*         Results from last 7 days   Lab Units 05/03/21  0221 05/02/21  1422   SODIUM mmol/L 139 136   POTASSIUM mmol/L 3 8 4 2   CHLORIDE mmol/L 100 95*   CO2 mmol/L 34* 33*   ANION GAP mmol/L 5 8   BUN mg/dL 7 9   CREATININE mg/dL 0 68 0 82   EGFR ml/min/1 73sq m 94 77   CALCIUM mg/dL 8 5 8 3   MAGNESIUM mg/dL 1 9  --      Results from last 7 days   Lab Units 05/03/21 0221 05/02/21  1422   AST U/L 17 20   ALT U/L 27 31   ALK PHOS U/L 157* 175*   TOTAL PROTEIN g/dL 6 2* 7 0   ALBUMIN g/dL 2 9* 3 3*   TOTAL BILIRUBIN mg/dL 0 50 0 40     Results from last 7 days   Lab Units 05/03/21  1127 05/03/21  0742 05/02/21  2044   POC GLUCOSE mg/dl 280* 224* 201*     Results from last 7 days   Lab Units 05/03/21  0221 05/02/21  1422   GLUCOSE RANDOM mg/dL 153* 312*           Results from last 7 days   Lab Units 05/03/21  0221 05/02/21  2207 05/02/21  1858 05/02/21  1638 05/02/21  1422   LACTIC ACID mmol/L 1 3 2 4* 2 3* 2 7* 3 0*         Results from last 7 days   Lab Units 05/02/21  1905   CLARITY UA  Clear   COLOR UA  Yellow   SPEC GRAV UA  <=1 005   PH UA  6 0   GLUCOSE UA mg/dl 250 (1/4%)*   KETONES UA mg/dl Negative   BLOOD UA  Negative   PROTEIN UA mg/dl Negative   NITRITE UA  Negative   BILIRUBIN UA  Negative   UROBILINOGEN UA E U /dl 0 2   LEUKOCYTES UA  Negative           Results from last 7 days   Lab Units 05/02/21  1459   BLOOD CULTURE  Received in Microbiology Lab  Culture in Progress  Received in Microbiology Lab  Culture in Progress           ED Treatment:   Medication Administration from 05/02/2021 1356 to 05/02/2021 1825       Date/Time Order Dose Route Action     05/02/2021 1427 sodium chloride 0 9 % bolus 500 mL 500 mL Intravenous New Bag     05/02/2021 1426 acetaminophen (TYLENOL) tablet 975 mg 975 mg Oral Given     05/02/2021 1600 sodium chloride 0 9 % bolus 500 mL 500 mL Intravenous New Bag     05/02/2021 1630 piperacillin-tazobactam (ZOSYN) IVPB 3 375 g 3 375 g Intravenous New Bag        Past Medical History:   Diagnosis Date    Alcohol abuse 5/21/2020    Alcohol abuse 5/21/2020    Anemia     Atrial fibrillation (HCC)     Cervical radiculopathy     CHF (congestive heart failure) (HCC)     Chronic low back pain     Chronic obstructive asthma ([facility] [address] ) 2/20/2018    Community acquired pneumonia     Community acquired pneumonia 5/21/2020    Diabetes mellitus ([facility] [address] )     Diabetes mellitus with hyperglycemia (HCC)     Elevated liver enzymes     GERD (gastroesophageal reflux disease)     Hypersomnolence 10/28/2020    Hypokalemia 12/4/2018    Paresthesia of upper extremity     Plantar fasciitis     Restless leg     Sexual dysfunction     Sleep apnea, obstructive     Tenosynovitis of finger     Tinea corporis     Weakness of upper extremity      Present on Admission:   Atrial fibrillation (HCC)   Benign essential hypertension   Chronic respiratory failure with hypoxia (HCC)   Class 3 severe obesity in adult (HCC)   Hypercholesterolemia   (Resolved) Lactic acidosis   Obstructive sleep apnea   Sepsis (HCC)   Type 2 diabetes mellitus, without long-term current use of insulin (HCC)      Admitting Diagnosis:   Diverticulitis [K57 92]  Abdominal pain [R10 9]  Severe sepsis (HCC) [A41 9, R65 20]    Age/Sex: 56 y o  female  Admission Orders:    apixaban, 5 mg, Oral, BID  ascorbic acid, 1,000 mg, Oral, Daily  atorvastatin, 40 mg, Oral, Daily With Dinner  cholecalciferol, 1,000 Units, Oral, Daily  citalopram, 20 mg, Oral, Daily  cyanocobalamin, 100 mcg, Oral, Daily  flecainide, 100 mg, Oral, Q12H LISETTE  fluticasone, 1 spray, Nasal, BID  fluticasone, 2 puff, Inhalation, BID  insulin glargine, 10 Units, Subcutaneous, HS  insulin lispro, 1-6 Units, Subcutaneous, HS  insulin lispro, 2-12 Units, Subcutaneous, TID AC  levalbuterol, 1 25 mg, Nebulization, BID  loratadine, 10 mg, Oral, Daily  LORazepam, 0 5 mg, Oral, HS  magnesium oxide, 400 mg, Oral, Daily  metoprolol succinate, 50 mg, Oral, Daily  montelukast, 10 mg, Oral, HS  pantoprazole, 40 mg, Oral, Early Morning  piperacillin-tazobactam, 3 375 g, Intravenous, Q6H  potassium chloride, 20 mEq, Oral, BID  torsemide, 40 mg, Oral, BID  traZODone, 150 mg, Oral, HS      Continuous IV Infusions:     PRN Meds:  acetaminophen, 650 mg, Oral, Q6H PRN  albuterol, 2 puff, Inhalation, Q4H PRN  aluminum-magnesium hydroxide-simethicone, 30 mL, Oral, Q6H PRN  HYDROmorphone, 0 5 mg, Intravenous, Q4H PRN  levalbuterol, 1 25 mg, Nebulization, Q8H PRN  simethicone, 80 mg, Oral, 4x Daily PRN  traMADol, 50 mg, Oral, Q6H PRN        IP CONSULT TO GASTROENTEROLOGY  IP CONSULT TO NUTRITION SERVICES    Network Utilization Review Department  ATTENTION: Please call with any questions or concerns to [phone] and carefully listen to the prompts so that you are directed to the right person  All voicemails are confidential   Jessica Doing all requests for admission clinical reviews, approved or denied determinations and any other requests to dedicated fax number below belonging to the campus where the patient is receiving treatment   List of dedicated fax numbers for the Facilities:  [address] DENIALS (Administrative/Medical Necessity) [phone]   [address] (Maternity/NICU/Pediatrics) [phone]   [address] [place] [address] [address] [id number] [place] [address] [address] [place] [address] [phone]

## 2021-05-04 LAB
ANION GAP SERPL CALCULATED.3IONS-SCNC: 10 MMOL/L (ref 4–13)
BUN SERPL-MCNC: 5 MG/DL (ref 5–25)
CALCIUM SERPL-MCNC: 8.7 MG/DL (ref 8.3–10.1)
CHLORIDE SERPL-SCNC: 98 MMOL/L (ref 100–108)
CO2 SERPL-SCNC: 31 MMOL/L (ref 21–32)
CREAT SERPL-MCNC: 0.57 MG/DL (ref 0.6–1.3)
ERYTHROCYTE [DISTWIDTH] IN BLOOD BY AUTOMATED COUNT: 17.1 % (ref 11.6–15.1)
GFR SERPL CREATININE-BSD FRML MDRD: 100 ML/MIN/1.73SQ M
GLUCOSE SERPL-MCNC: 210 MG/DL (ref 65–140)
GLUCOSE SERPL-MCNC: 214 MG/DL (ref 65–140)
GLUCOSE SERPL-MCNC: 258 MG/DL (ref 65–140)
GLUCOSE SERPL-MCNC: 288 MG/DL (ref 65–140)
GLUCOSE SERPL-MCNC: 374 MG/DL (ref 65–140)
HCT VFR BLD AUTO: 32.7 % (ref 34.8–46.1)
HGB BLD-MCNC: 9.8 G/DL (ref 11.5–15.4)
MCH RBC QN AUTO: 21.6 PG (ref 26.8–34.3)
MCHC RBC AUTO-ENTMCNC: 30 G/DL (ref 31.4–37.4)
MCV RBC AUTO: 72 FL (ref 82–98)
PLATELET # BLD AUTO: 264 THOUSANDS/UL (ref 149–390)
PMV BLD AUTO: 10.6 FL (ref 8.9–12.7)
POTASSIUM SERPL-SCNC: 3.6 MMOL/L (ref 3.5–5.3)
RBC # BLD AUTO: 4.54 MILLION/UL (ref 3.81–5.12)
SODIUM SERPL-SCNC: 139 MMOL/L (ref 136–145)
WBC # BLD AUTO: 9.49 THOUSAND/UL (ref 4.31–10.16)

## 2021-05-04 PROCEDURE — 94640 AIRWAY INHALATION TREATMENT: CPT

## 2021-05-04 PROCEDURE — 85027 COMPLETE CBC AUTOMATED: CPT | Performed by: INTERNAL MEDICINE

## 2021-05-04 PROCEDURE — 99232 SBSQ HOSP IP/OBS MODERATE 35: CPT | Performed by: INTERNAL MEDICINE

## 2021-05-04 PROCEDURE — 82948 REAGENT STRIP/BLOOD GLUCOSE: CPT

## 2021-05-04 PROCEDURE — 94760 N-INVAS EAR/PLS OXIMETRY 1: CPT

## 2021-05-04 PROCEDURE — 99232 SBSQ HOSP IP/OBS MODERATE 35: CPT | Performed by: PHYSICIAN ASSISTANT

## 2021-05-04 PROCEDURE — 80048 BASIC METABOLIC PNL TOTAL CA: CPT | Performed by: INTERNAL MEDICINE

## 2021-05-04 RX ADMIN — TRAZODONE HYDROCHLORIDE 150 MG: 50 TABLET ORAL at 21:21

## 2021-05-04 RX ADMIN — LORAZEPAM 0.5 MG: 0.5 TABLET ORAL at 21:22

## 2021-05-04 RX ADMIN — POTASSIUM CHLORIDE 20 MEQ: 1500 TABLET, EXTENDED RELEASE ORAL at 09:36

## 2021-05-04 RX ADMIN — ATORVASTATIN CALCIUM 40 MG: 40 TABLET, FILM COATED ORAL at 17:00

## 2021-05-04 RX ADMIN — INSULIN LISPRO 4 UNITS: 100 INJECTION, SOLUTION INTRAVENOUS; SUBCUTANEOUS at 21:22

## 2021-05-04 RX ADMIN — LEVALBUTEROL HYDROCHLORIDE 1.25 MG: 1.25 SOLUTION, CONCENTRATE RESPIRATORY (INHALATION) at 08:25

## 2021-05-04 RX ADMIN — Medication 100 MCG: at 09:36

## 2021-05-04 RX ADMIN — OXYCODONE HYDROCHLORIDE AND ACETAMINOPHEN 1000 MG: 500 TABLET ORAL at 09:36

## 2021-05-04 RX ADMIN — FLECAINIDE ACETATE 100 MG: 100 TABLET ORAL at 09:37

## 2021-05-04 RX ADMIN — Medication 1000 UNITS: at 09:36

## 2021-05-04 RX ADMIN — INSULIN LISPRO 10 UNITS: 100 INJECTION, SOLUTION INTRAVENOUS; SUBCUTANEOUS at 16:02

## 2021-05-04 RX ADMIN — PIPERACILLIN AND TAZOBACTAM 3.38 G: 3; .375 INJECTION, POWDER, LYOPHILIZED, FOR SOLUTION INTRAVENOUS at 21:32

## 2021-05-04 RX ADMIN — FLUTICASONE PROPIONATE 2 PUFF: 220 AEROSOL, METERED RESPIRATORY (INHALATION) at 09:37

## 2021-05-04 RX ADMIN — ACETAMINOPHEN 650 MG: 325 TABLET, FILM COATED ORAL at 22:38

## 2021-05-04 RX ADMIN — FLUTICASONE PROPIONATE 2 PUFF: 220 AEROSOL, METERED RESPIRATORY (INHALATION) at 17:01

## 2021-05-04 RX ADMIN — INSULIN GLARGINE 10 UNITS: 100 INJECTION, SOLUTION SUBCUTANEOUS at 21:22

## 2021-05-04 RX ADMIN — PANTOPRAZOLE SODIUM 40 MG: 40 TABLET, DELAYED RELEASE ORAL at 05:22

## 2021-05-04 RX ADMIN — APIXABAN 5 MG: 5 TABLET, FILM COATED ORAL at 09:36

## 2021-05-04 RX ADMIN — INSULIN LISPRO 4 UNITS: 100 INJECTION, SOLUTION INTRAVENOUS; SUBCUTANEOUS at 07:18

## 2021-05-04 RX ADMIN — METOPROLOL SUCCINATE 50 MG: 50 TABLET, EXTENDED RELEASE ORAL at 09:36

## 2021-05-04 RX ADMIN — MONTELUKAST SODIUM 10 MG: 10 TABLET, FILM COATED ORAL at 21:22

## 2021-05-04 RX ADMIN — FLECAINIDE ACETATE 100 MG: 100 TABLET ORAL at 21:22

## 2021-05-04 RX ADMIN — PIPERACILLIN AND TAZOBACTAM 3.38 G: 3; .375 INJECTION, POWDER, LYOPHILIZED, FOR SOLUTION INTRAVENOUS at 17:03

## 2021-05-04 RX ADMIN — POTASSIUM CHLORIDE 20 MEQ: 1500 TABLET, EXTENDED RELEASE ORAL at 17:00

## 2021-05-04 RX ADMIN — Medication 400 MG: at 09:36

## 2021-05-04 RX ADMIN — TORSEMIDE 40 MG: 20 TABLET ORAL at 09:36

## 2021-05-04 RX ADMIN — TORSEMIDE 40 MG: 20 TABLET ORAL at 21:22

## 2021-05-04 RX ADMIN — LEVALBUTEROL HYDROCHLORIDE 1.25 MG: 1.25 SOLUTION, CONCENTRATE RESPIRATORY (INHALATION) at 20:49

## 2021-05-04 RX ADMIN — LORATADINE 10 MG: 10 TABLET ORAL at 09:36

## 2021-05-04 RX ADMIN — FLUTICASONE PROPIONATE 1 SPRAY: 50 SPRAY, METERED NASAL at 09:37

## 2021-05-04 RX ADMIN — ACETAMINOPHEN 650 MG: 325 TABLET, FILM COATED ORAL at 05:22

## 2021-05-04 RX ADMIN — INSULIN LISPRO 6 UNITS: 100 INJECTION, SOLUTION INTRAVENOUS; SUBCUTANEOUS at 12:01

## 2021-05-04 RX ADMIN — PIPERACILLIN AND TAZOBACTAM 3.38 G: 3; .375 INJECTION, POWDER, LYOPHILIZED, FOR SOLUTION INTRAVENOUS at 05:22

## 2021-05-04 RX ADMIN — CITALOPRAM HYDROBROMIDE 20 MG: 20 TABLET ORAL at 09:36

## 2021-05-04 RX ADMIN — APIXABAN 5 MG: 5 TABLET, FILM COATED ORAL at 17:00

## 2021-05-04 RX ADMIN — PIPERACILLIN AND TAZOBACTAM 3.38 G: 3; .375 INJECTION, POWDER, LYOPHILIZED, FOR SOLUTION INTRAVENOUS at 11:00

## 2021-05-04 NOTE — ASSESSMENT & PLAN NOTE
· Acute diverticulitis, poa, a/e/b lower abdominal pain for 2 days and CT scan findings of acute sigmoid diverticulitis  · CT: Findings compatible with acute diverticulitis at the redundant mid sigmoid colon which extends into the right lower quadrant  Normal appendix    · Continue empiric treatment with Zosyn  · Clear liquid diet initially  · Advance to low residue diet per GI due to pain reduction  · Zofran prn for N/V  · Appreciate GI consult  · Recommend outpatient colonoscopy in 6-8 weeks

## 2021-05-04 NOTE — ASSESSMENT & PLAN NOTE
· Noted to have gastric 2 polyp on CT: Two polyps suggested along the greater curvature of the gastric body measuring up to 9 mm in size  GI consultation advised    · Appreciate GI consult   · recommend EGD at the time of colonoscopy

## 2021-05-04 NOTE — ASSESSMENT & PLAN NOTE
· Heart rate controlled with Metoprolol   · Continue with Eliquis   · Patient stated she has been feeling palpitations in chest occasional and attributes this to A fib; states this happens outpatient occasionally

## 2021-05-04 NOTE — PROGRESS NOTES
Progress Note - Roxana Kapoor 56 y o  female MRN: [id number]    Unit/Bed#: -01 Encounter: [id number]    Assessment and Plan:    58-year-old female with AFib on Eliquis, chronic respiratory failure with hypoxia on 4 L nasal cannula, type 2 diabetes admitted with acute uncomplicated diverticulitis  1) Acute uncomplicated sigmoid diverticulitis:  She presented with acute abdominal pain and CT scan showed marked infiltrative changes in the right lower quadrant at the level of the redundant mid sigmoid colon with wall thickening and inflammatory diverticula compatible with diverticulitis  Her leukocytosis has resolved  She is afebrile  She reports significant improvement in abdominal pain and is tolerating clear liquid diet     -advanced to low residue diet  -continue IV antibiotics  -monitor white blood cell count and temperature  -recommend colonoscopy in 6-8 weeks    2) History of polyps:  According to our records, she had colonoscopy in 2015 with [facility] gastroenterology  1 colon polyp was removed     -she is due for repeat colonoscopy, we can plan procedure as an outpatient    3) Gastric polyps on CT:  Her CT imaging shows 2 polyps along the greater curvature of the stomach measuring up to 9 mm in size     -recommend EGD at the time of colonoscopy to evaluate stomach polyps        Subjective:     Patient seen and examined at bedside  Her abdominal pain is significantly improved  She denies any nausea or vomiting  She is tolerating clear liquid diet  Objective:     Vitals: Blood pressure 126/54, pulse 70, temperature 98 5 °F (36 9 °C), temperature source Axillary, resp  rate 18, height 5' 3" (1 6 m), weight 129 kg (285 lb 6 4 oz), SpO2 98 %, not currently breastfeeding  ,Body mass index is 50 56 kg/m²        Intake/Output Summary (Last 24 hours) at 5/4/2021 1121  Last data filed at 5/4/2021 1120  Gross per 24 hour   Intake 3224 ml   Output 5500 ml   Net -2276 ml       Physical Exam:     General Appearance: Alert, obese female, appears stated age and cooperative  Lungs: Clear to auscultation bilaterally  Heart: Regular rate and rhythm  Abdomen:  Round  Normal bowel sounds  Soft  Moderate right lower quadrant and hypogastric tenderness to palpation  No rebound or guarding  Extremities: No cyanosis, edema    Invasive Devices     Peripheral Intravenous Line            Peripheral IV 05/03/21 Distal;Left;Ventral (anterior) Forearm 1 day                Lab Results:  Results from last 7 days   Lab Units 05/04/21  0546 05/02/21  1422   WBC Thousand/uL 9 49 16 19*   HEMOGLOBIN g/dL 9 8* 10 9*   HEMATOCRIT % 32 7* 36 3   PLATELETS Thousands/uL 264 316   NEUTROS PCT %  --  85*   LYMPHS PCT %  --  7*   MONOS PCT %  --  6   EOS PCT %  --  1     Results from last 7 days   Lab Units 05/04/21  0546 05/03/21  0221   POTASSIUM mmol/L 3 6 3 8   CHLORIDE mmol/L 98* 100   CO2 mmol/L 31 34*   BUN mg/dL 5 7   CREATININE mg/dL 0 57* 0 68   CALCIUM mg/dL 8 7 8 5   ALK PHOS U/L  --  157*   ALT U/L  --  27   AST U/L  --  17               Imaging Studies: I have personally reviewed pertinent imaging studies  Ct Abdomen Pelvis With Contrast    Result Date: 5/2/2021  Impression: 1  Findings compatible with acute diverticulitis at the redundant mid sigmoid colon which extends into the right lower quadrant  Normal appendix  2   Two polyps suggested along the greater curvature of the gastric body measuring up to 9 mm in size  GI consultation advised  The study was marked in [facility] for immediate notification   Workstation performed: [id number]

## 2021-05-04 NOTE — ASSESSMENT & PLAN NOTE
Lab Results   Component Value Date    HGBA1C 8 4 (A) 10/28/2020     Recent Labs     05/03/21  1129 05/03/21  1705 05/03/21  2255 05/04/21  0716   POCGLU 280* 284* 187* 214*     Blood Sugar Average: Last 72 hrs:  (P) 135 [id number]   · Patient is on Amaryl and also metformin as an outpatient    Last hemoglobin A1c of 8 4  · Home meds held  · On 10iu lantus hs - titrate based on accucheks  · Insulin sliding scale coverage  · Advanced to diabetic diet low residue

## 2021-05-04 NOTE — PLAN OF CARE
Problem: Potential for Falls  Goal: Patient will remain free of falls  Description: INTERVENTIONS:  - Assess patient frequently for physical needs  -  Identify cognitive and physical deficits and behaviors that affect risk of falls  -  [place] fall precautions as indicated by assessment   - Educate patient/family on patient safety including physical limitations  - Instruct patient to call for assistance with activity based on assessment  - Modify environment to reduce risk of injury  - Consider OT/PT consult to assist with strengthening/mobility  Outcome: Progressing     Problem: Nutrition/Hydration-ADULT  Goal: Nutrient/Hydration intake appropriate for improving, restoring or maintaining nutritional needs  Description: Monitor and assess patient's nutrition/hydration status for malnutrition  Collaborate with interdisciplinary team and initiate plan and interventions as ordered  Monitor patient's weight and dietary intake as ordered or per policy  Utilize nutrition screening tool and intervene as necessary  Determine patient's food preferences and provide high-protein, high-caloric foods as appropriate       INTERVENTIONS:  - Monitor oral intake, urinary output, labs, and treatment plans  - Assess nutrition and hydration status and recommend course of action  - Evaluate amount of meals eaten  - Assist patient with eating if necessary   - Allow adequate time for meals  - Recommend/ encourage appropriate diets, oral nutritional supplements, and vitamin/mineral supplements  - Order, calculate, and assess calorie counts as needed  - Recommend, monitor, and adjust tube feedings and TPN/PPN based on assessed needs  - Assess need for intravenous fluids  - Provide specific nutrition/hydration education as appropriate  - Include patient/family/caregiver in decisions related to nutrition  Outcome: Progressing     Problem: PAIN - ADULT  Goal: Verbalizes/displays adequate comfort level or baseline comfort level  Description: Interventions:  - Encourage patient to monitor pain and request assistance  - Assess pain using appropriate pain scale  - Administer analgesics based on type and severity of pain and evaluate response  - Implement non-pharmacological measures as appropriate and evaluate response  - Consider cultural and social influences on pain and pain management  - Notify physician/advanced practitioner if interventions unsuccessful or patient reports new pain  Outcome: Progressing     Problem: INFECTION - ADULT  Goal: Absence or prevention of progression during hospitalization  Description: INTERVENTIONS:  - Assess and monitor for signs and symptoms of infection  - Monitor lab/diagnostic results  - Monitor all insertion sites, i e  indwelling lines, tubes, and drains  - Monitor endotracheal if appropriate and nasal secretions for changes in amount and color  - [place] appropriate cooling/warming therapies per order  - Administer medications as ordered  - Instruct and encourage patient and family to use good hand hygiene technique  - Identify and instruct in appropriate isolation precautions for identified infection/condition  Outcome: Progressing     Problem: SAFETY ADULT  Goal: Patient will remain free of falls  Description: INTERVENTIONS:  - Assess patient frequently for physical needs  -  Identify cognitive and physical deficits and behaviors that affect risk of falls    -  [place] fall precautions as indicated by assessment   - Educate patient/family on patient safety including physical limitations  - Instruct patient to call for assistance with activity based on assessment  - Modify environment to reduce risk of injury  - Consider OT/PT consult to assist with strengthening/mobility  Outcome: Progressing  Goal: Maintain or return to baseline ADL function  Description: INTERVENTIONS:  -  Assess patient's ability to carry out ADLs; assess patient's baseline for ADL function and identify physical deficits which impact ability to perform ADLs (bathing, care of mouth/teeth, toileting, grooming, dressing, etc )  - Assess/evaluate cause of self-care deficits   - Assess range of motion  - Assess patient's mobility; develop plan if impaired  - Assess patient's need for assistive devices and provide as appropriate  - Encourage maximum independence but intervene and supervise when necessary  - Involve family in performance of ADLs  - Assess for home care needs following discharge   - Consider OT consult to assist with ADL evaluation and planning for discharge  - Provide patient education as appropriate  Outcome: Progressing     Problem: DISCHARGE PLANNING  Goal: Discharge to home or other facility with appropriate resources  Description: INTERVENTIONS:  - Identify barriers to discharge w/patient and caregiver  - Arrange for needed discharge resources and transportation as appropriate  - Identify discharge learning needs (meds, wound care, etc )  - Arrange for interpretive services to assist at discharge as needed  - Refer to Case Management Department for coordinating discharge planning if the patient needs post-hospital services based on physician/advanced practitioner order or complex needs related to functional status, cognitive ability, or social support system  Outcome: Progressing     Problem: Knowledge Deficit  Goal: Patient/family/caregiver demonstrates understanding of disease process, treatment plan, medications, and discharge instructions  Description: Complete learning assessment and assess knowledge base    Interventions:  - Provide teaching at level of understanding  - Provide teaching via preferred learning methods  Outcome: Progressing     Problem: RESPIRATORY - ADULT  Goal: Achieves optimal ventilation and oxygenation  Description: INTERVENTIONS:  - Assess for changes in respiratory status  - Assess for changes in mentation and behavior  - Position to facilitate oxygenation and minimize respiratory effort  - Oxygen administered by appropriate delivery if ordered  - Initiate smoking cessation education as indicated  - Encourage broncho-pulmonary hygiene including cough, deep breathe, Incentive Spirometry  - Assess the need for suctioning and aspirate as needed  - Assess and instruct to report SOB or any respiratory difficulty  - Respiratory Therapy support as indicated  Outcome: Progressing     Problem: GASTROINTESTINAL - ADULT  Goal: Minimal or absence of nausea and/or vomiting  Description: INTERVENTIONS:  - Administer IV fluids if ordered to ensure adequate hydration  - Maintain NPO status until nausea and vomiting are resolved  - Nasogastric tube if ordered  - Administer ordered antiemetic medications as needed  - Provide nonpharmacologic comfort measures as appropriate  - Advance diet as tolerated, if ordered  - Consider nutrition services referral to assist patient with adequate nutrition and appropriate food choices  Outcome: Progressing  Goal: Maintains or returns to baseline bowel function  Description: INTERVENTIONS:  - Assess bowel function  - Encourage oral fluids to ensure adequate hydration  - Administer IV fluids if ordered to ensure adequate hydration  - Administer ordered medications as needed  - Encourage mobilization and activity  - Consider nutritional services referral to assist patient with adequate nutrition and appropriate food choices  Outcome: Progressing  Goal: Maintains adequate nutritional intake  Description: INTERVENTIONS:  - Monitor percentage of each meal consumed  - Identify factors contributing to decreased intake, treat as appropriate  - Assist with meals as needed  - Monitor I&O, weight, and lab values if indicated  - Obtain nutrition services referral as needed  Outcome: Progressing     Problem: METABOLIC, FLUID AND ELECTROLYTES - ADULT  Goal: Electrolytes maintained within normal limits  Description: INTERVENTIONS:  - Monitor labs and assess patient for signs and symptoms of electrolyte imbalances  - Administer electrolyte replacement as ordered  - Monitor response to electrolyte replacements, including repeat lab results as appropriate  - Instruct patient on fluid and nutrition as appropriate  Outcome: Progressing     Problem: MUSCULOSKELETAL - ADULT  Goal: Maintain or return mobility to safest level of function  Description: INTERVENTIONS:  - Assess patient's ability to carry out ADLs; assess patient's baseline for ADL function and identify physical deficits which impact ability to perform ADLs (bathing, care of mouth/teeth, toileting, grooming, dressing, etc )  - Assess/evaluate cause of self-care deficits   - Assess range of motion  - Assess patient's mobility  - Assess patient's need for assistive devices and provide as appropriate  - Encourage maximum independence but intervene and supervise when necessary  - Involve family in performance of ADLs  - Assess for home care needs following discharge   - Consider OT consult to assist with ADL evaluation and planning for discharge  - Provide patient education as appropriate  Outcome: Progressing

## 2021-05-04 NOTE — PROGRESS NOTES
[place]  Progress Note - Torsten Commander 1960, 56 y o  female MRN: [id number]  Unit/Bed#: -Joe Encounter: [id number]  Primary Care Provider: Giovanny Hardin MD   Date and time admitted to hospital: 5/2/2021  1:59 PM    * Acute diverticulitis  Assessment & Plan  · Acute diverticulitis, poa, a/e/b lower abdominal pain for 2 days and CT scan findings of acute sigmoid diverticulitis  · CT: Findings compatible with acute diverticulitis at the redundant mid sigmoid colon which extends into the right lower quadrant  Normal appendix  · Continue empiric treatment with Zosyn  · Clear liquid diet initially  · Advance to low residue diet per GI due to pain reduction  · Zofran prn for N/V  · Appreciate GI consult  · Recommend outpatient colonoscopy in 6-8 weeks     Gastric polyp  Assessment & Plan  · Noted to have gastric 2 polyp on CT: Two polyps suggested along the greater curvature of the gastric body measuring up to 9 mm in size  GI consultation advised  · Appreciate GI consult   · recommend EGD at the time of colonoscopy     Class 3 severe obesity in adult [facility])  Assessment & Plan  · TLC as outpatient    Sepsis ([facility] [address] )  Assessment & Plan  · POA, HR >90, RR >20, leucocytosis 16k, Lactic acidosis    · S/p IVFs - lactic acidosis resolved  · On empiric antibiotics    · Blood cultures-pending    Atrial fibrillation (HCC)  Assessment & Plan  · Heart rate controlled with Metoprolol   · Continue with Eliquis   · Patient stated she has been feeling palpitations in chest occasional and attributes this to A fib; states this happens outpatient occasionally     Chronic respiratory failure with hypoxia (HCC)  Assessment & Plan  · Chronic resp failure on 4L NC in the setting of WILMA, asthma history  · Maintain Os sats > 91% while inpatient  · Continue with CPAP at night    Type 2 diabetes mellitus, without long-term current use of insulin [facility])  Assessment & Plan  Lab Results   Component Value Date    HGBA1C 8 4 (A) 10/28/2020     Recent Labs     21  1129 21  1705 21  2255 21  0716   POCGLU 280* 284* 187* 214*     Blood Sugar Average: Last 72 hrs:  (P) 836 [id number]   · Patient is on Amaryl and also metformin as an outpatient  Last hemoglobin A1c of 8 4  · Home meds held  · On 10iu lantus hs - titrate based on accucheks  · Insulin sliding scale coverage  · Advanced to diabetic diet low residue     Hypercholesterolemia  Assessment & Plan  · Continue statin    Obstructive sleep apnea  Assessment & Plan  · Continue CPAP q h s  Benign essential hypertension  Assessment & Plan  · Monitor blood pressure  · Continue with outpatient medication  · Avoid hypotension     VTE Pharmacologic Prophylaxis:   Pharmacologic: Apixaban (Eliquis)  Mechanical VTE Prophylaxis in Place: Yes    Patient Centered Rounds: I have performed bedside rounds with nursing staff today  Discussions with Specialists or Other Care Team Provider: cm     Education and Discussions with Family / Patient: patient, declined call to family     Time Spent for Care: 30 minutes  More than 50% of total time spent on counseling and coordination of care as described above  Current Length of Stay: 2 day(s)    Current Patient Status: Inpatient   Certification Statement: The patient will continue to require additional inpatient hospital stay due to IV abx, advancement of diet, ongoing sx     Discharge Plan: likely tomorrow if pain improved and tolerate PO     Code Status: Level 1 - Full Code      Subjective:   Pt seen and examined at bedside  Feels improved but still has periods of RLQ pain and diarrhea  Ordered food for dinner       Objective:     Vitals:   Temp (24hrs), Av 2 °F (36 8 °C), Min:98 1 °F (36 7 °C), Max:98 5 °F (36 9 °C)    Temp:  [98 1 °F (36 7 °C)-98 5 °F (36 9 °C)] 98 5 °F (36 9 °C)  HR:  [70-79] 70  Resp:  [16-18] 18  BP: (110-126)/(54-61) 126/54  SpO2:  [95 %-100 %] 98 %  Body mass index is 50 56 kg/m²  Input and Output Summary (last 24 hours): Intake/Output Summary (Last 24 hours) at 5/4/2021 1540  Last data filed at 5/4/2021 1155  Gross per 24 hour   Intake 3350 ml   Output 4300 ml   Net -950 ml       Physical Exam:     Physical Exam  Constitutional:       Appearance: Normal appearance  She is obese  HENT:      Head: Normocephalic and atraumatic  Mouth/Throat:      Mouth: Mucous membranes are moist    Eyes:      Extraocular Movements: Extraocular movements intact  Neck:      Musculoskeletal: Normal range of motion and neck supple  Cardiovascular:      Rate and Rhythm: Normal rate and regular rhythm  Pulmonary:      Effort: Pulmonary effort is normal       Breath sounds: Normal breath sounds  Abdominal:      Palpations: Abdomen is soft  Tenderness: There is abdominal tenderness  Musculoskeletal: Normal range of motion  Skin:     General: Skin is warm  Neurological:      General: No focal deficit present  Mental Status: She is alert and oriented to person, place, and time     Psychiatric:         Mood and Affect: Mood normal          Behavior: Behavior normal        Additional Data:     Labs:    Results from last 7 days   Lab Units 05/04/21  0546 05/02/21  1422   WBC Thousand/uL 9 49 16 19*   HEMOGLOBIN g/dL 9 8* 10 9*   HEMATOCRIT % 32 7* 36 3   PLATELETS Thousands/uL 264 316   NEUTROS PCT %  --  85*   LYMPHS PCT %  --  7*   MONOS PCT %  --  6   EOS PCT %  --  1     Results from last 7 days   Lab Units 05/04/21  0546 05/03/21  0221   SODIUM mmol/L 139 139   POTASSIUM mmol/L 3 6 3 8   CHLORIDE mmol/L 98* 100   CO2 mmol/L 31 34*   BUN mg/dL 5 7   CREATININE mg/dL 0 57* 0 68   ANION GAP mmol/L 10 5   CALCIUM mg/dL 8 7 8 5   ALBUMIN g/dL  --  2 9*   TOTAL BILIRUBIN mg/dL  --  0 50   ALK PHOS U/L  --  157*   ALT U/L  --  27   AST U/L  --  17   GLUCOSE RANDOM mg/dL 210* 153*         Results from last 7 days   Lab Units 05/04/21  1200 05/04/21  0716 05/03/21  1425 05/03/21  1705 05/03/21  1129 05/03/21  0742 05/02/21  2044   POC GLUCOSE mg/dl 258* 214* 187* 284* 280* 224* 201*         Results from last 7 days   Lab Units 05/03/21  0221 05/02/21  2207 05/02/21  1858 05/02/21  1638   LACTIC ACID mmol/L 1 3 2 4* 2 3* 2 7*     * I Have Reviewed All Lab Data Listed Above  * Additional Pertinent Lab Tests Reviewed: [address] 64 Admission Reviewed    Imaging:    Imaging Reports Reviewed Today Include: all  Imaging Personally Reviewed by Myself Includes:  none    Recent Cultures (last 7 days):     Results from last 7 days   Lab Units 05/02/21  1459   BLOOD CULTURE  No Growth at 24 hrs  No Growth at 24 hrs         Last 24 Hours Medication List:   Current Facility-Administered Medications   Medication Dose Route Frequency Provider Last Rate    acetaminophen  650 mg Oral Q6H PRN Zoraida Riley MD      albuterol  2 puff Inhalation Q4H PRN Zoraida Riley MD      aluminum-magnesium hydroxide-simethicone  30 mL Oral Q6H PRN Zoraida Riley MD      apixaban  5 mg Oral BID Zoraida Riley MD      ascorbic acid  1,000 mg Oral Daily Zoraida Riley MD      atorvastatin  40 mg Oral Daily With Rakan Carrero MD      cholecalciferol  1,000 Units Oral Daily Zoraida Riley MD      citalopram  20 mg Oral Daily Zoraida Riley MD      cyanocobalamin  100 mcg Oral Daily Zoraida Riley MD      flecainide  100 mg Oral Q12H [facility] Zoraida Riley MD      fluticasone  1 spray Nasal BID Zoraida Riley MD      fluticasone  2 puff Inhalation BID Zoraida Riley MD      HYDROmorphone  0 5 mg Intravenous Q4H PRN Ulisses Finn PA-C      insulin glargine  10 Units Subcutaneous HS Zoraida Riley MD      insulin lispro  1-6 Units Subcutaneous HS Zoraida Riley MD      insulin lispro  2-12 Units Subcutaneous TID [facility] Zoraida Riley MD      levalbuterol  1 25 mg Nebulization BID Zoraida Riley MD      levalbuterol  1 25 mg Nebulization Q8H PRN Zoraida Riley MD      loratadine  10 mg Oral Daily Kike Bread, MD      LORazepam  0 5 mg Oral HS Kike Bread, MD      magnesium oxide  400 mg Oral Daily Kike Bread, MD      metoprolol succinate  50 mg Oral Daily Kike Bread, MD      montelukast  10 mg Oral HS Kike Bread, MD      pantoprazole  40 mg Oral Early Morning Kike Bread, MD      piperacillin-tazobactam  3 375 g Intravenous Q6H Kike Bread, MD Stopped (05/04/21 7037)    potassium chloride  20 mEq Oral BID Kike Valentino, MD      simethicone  80 mg Oral 4x Daily PRN Kike Bread, MD      torsemide  40 mg Oral BID Gabby Mauro MD      traMADol  50 mg Oral Q6H PRN Lillian Garnett PA-C      traZODone  150 mg Oral HS Kike Bread, MD          Today, Patient Was Seen By: Latesha Posada PA-C    ** Please Note: Dictation voice to text software may have been used in the creation of this document   **

## 2021-05-04 NOTE — ASSESSMENT & PLAN NOTE
· POA, HR >90, RR >20, leucocytosis 16k, Lactic acidosis    · S/p IVFs - lactic acidosis resolved  · On empiric antibiotics    · Blood cultures-pending

## 2021-05-05 VITALS
WEIGHT: 285.4 LBS | DIASTOLIC BLOOD PRESSURE: 69 MMHG | BODY MASS INDEX: 50.57 KG/M2 | OXYGEN SATURATION: 96 % | HEIGHT: 63 IN | HEART RATE: 80 BPM | TEMPERATURE: 98.4 F | RESPIRATION RATE: 19 BRPM | SYSTOLIC BLOOD PRESSURE: 155 MMHG

## 2021-05-05 DIAGNOSIS — G47.00 INSOMNIA, UNSPECIFIED TYPE: ICD-10-CM

## 2021-05-05 DIAGNOSIS — J44.9 CHRONIC OBSTRUCTIVE PULMONARY DISEASE, UNSPECIFIED COPD TYPE (HCC): ICD-10-CM

## 2021-05-05 DIAGNOSIS — Z79.899 ENCOUNTER FOR LONG-TERM (CURRENT) DRUG USE: ICD-10-CM

## 2021-05-05 PROBLEM — A41.9 SEPSIS (HCC): Status: RESOLVED | Noted: 2020-05-29 | Resolved: 2021-05-05

## 2021-05-05 LAB
ANION GAP SERPL CALCULATED.3IONS-SCNC: 8 MMOL/L (ref 4–13)
BUN SERPL-MCNC: 5 MG/DL (ref 5–25)
CALCIUM SERPL-MCNC: 8.5 MG/DL (ref 8.3–10.1)
CHLORIDE SERPL-SCNC: 100 MMOL/L (ref 100–108)
CO2 SERPL-SCNC: 36 MMOL/L (ref 21–32)
CREAT SERPL-MCNC: 0.78 MG/DL (ref 0.6–1.3)
ERYTHROCYTE [DISTWIDTH] IN BLOOD BY AUTOMATED COUNT: 17.6 % (ref 11.6–15.1)
FERRITIN SERPL-MCNC: 46 NG/ML (ref 8–388)
GFR SERPL CREATININE-BSD FRML MDRD: 82 ML/MIN/1.73SQ M
GLUCOSE SERPL-MCNC: 212 MG/DL (ref 65–140)
GLUCOSE SERPL-MCNC: 218 MG/DL (ref 65–140)
GLUCOSE SERPL-MCNC: 294 MG/DL (ref 65–140)
HCT VFR BLD AUTO: 34.2 % (ref 34.8–46.1)
HGB BLD-MCNC: 10.1 G/DL (ref 11.5–15.4)
IRON SATN MFR SERPL: 6 %
IRON SERPL-MCNC: 24 UG/DL (ref 50–170)
MCH RBC QN AUTO: 21.5 PG (ref 26.8–34.3)
MCHC RBC AUTO-ENTMCNC: 29.5 G/DL (ref 31.4–37.4)
MCV RBC AUTO: 73 FL (ref 82–98)
PLATELET # BLD AUTO: 360 THOUSANDS/UL (ref 149–390)
PMV BLD AUTO: 11.3 FL (ref 8.9–12.7)
POTASSIUM SERPL-SCNC: 3.7 MMOL/L (ref 3.5–5.3)
RBC # BLD AUTO: 4.7 MILLION/UL (ref 3.81–5.12)
SODIUM SERPL-SCNC: 144 MMOL/L (ref 136–145)
TIBC SERPL-MCNC: 435 UG/DL (ref 250–450)
WBC # BLD AUTO: 8.81 THOUSAND/UL (ref 4.31–10.16)

## 2021-05-05 PROCEDURE — 82728 ASSAY OF FERRITIN: CPT | Performed by: INTERNAL MEDICINE

## 2021-05-05 PROCEDURE — 83540 ASSAY OF IRON: CPT | Performed by: INTERNAL MEDICINE

## 2021-05-05 PROCEDURE — 80048 BASIC METABOLIC PNL TOTAL CA: CPT | Performed by: INTERNAL MEDICINE

## 2021-05-05 PROCEDURE — 82948 REAGENT STRIP/BLOOD GLUCOSE: CPT

## 2021-05-05 PROCEDURE — 85027 COMPLETE CBC AUTOMATED: CPT | Performed by: INTERNAL MEDICINE

## 2021-05-05 PROCEDURE — 83550 IRON BINDING TEST: CPT | Performed by: INTERNAL MEDICINE

## 2021-05-05 PROCEDURE — 99232 SBSQ HOSP IP/OBS MODERATE 35: CPT | Performed by: INTERNAL MEDICINE

## 2021-05-05 PROCEDURE — 94760 N-INVAS EAR/PLS OXIMETRY 1: CPT

## 2021-05-05 PROCEDURE — 94640 AIRWAY INHALATION TREATMENT: CPT

## 2021-05-05 PROCEDURE — 99239 HOSP IP/OBS DSCHRG MGMT >30: CPT | Performed by: INTERNAL MEDICINE

## 2021-05-05 RX ORDER — AMOXICILLIN AND CLAVULANATE POTASSIUM 875; 125 MG/1; MG/1
1 TABLET, FILM COATED ORAL EVERY 12 HOURS SCHEDULED
Qty: 14 TABLET | Refills: 0 | Status: SHIPPED | OUTPATIENT
Start: 2021-05-05 | End: 2021-05-12

## 2021-05-05 RX ADMIN — TRAMADOL HYDROCHLORIDE 50 MG: 50 TABLET, FILM COATED ORAL at 03:52

## 2021-05-05 RX ADMIN — PIPERACILLIN AND TAZOBACTAM 3.38 G: 3; .375 INJECTION, POWDER, LYOPHILIZED, FOR SOLUTION INTRAVENOUS at 10:09

## 2021-05-05 RX ADMIN — LORATADINE 10 MG: 10 TABLET ORAL at 08:55

## 2021-05-05 RX ADMIN — PANTOPRAZOLE SODIUM 40 MG: 40 TABLET, DELAYED RELEASE ORAL at 05:08

## 2021-05-05 RX ADMIN — PIPERACILLIN AND TAZOBACTAM 3.38 G: 3; .375 INJECTION, POWDER, LYOPHILIZED, FOR SOLUTION INTRAVENOUS at 03:52

## 2021-05-05 RX ADMIN — FLECAINIDE ACETATE 100 MG: 100 TABLET ORAL at 08:55

## 2021-05-05 RX ADMIN — INSULIN LISPRO 2 UNITS: 100 INJECTION, SOLUTION INTRAVENOUS; SUBCUTANEOUS at 08:59

## 2021-05-05 RX ADMIN — INSULIN LISPRO 6 UNITS: 100 INJECTION, SOLUTION INTRAVENOUS; SUBCUTANEOUS at 11:38

## 2021-05-05 RX ADMIN — FLUTICASONE PROPIONATE 2 PUFF: 220 AEROSOL, METERED RESPIRATORY (INHALATION) at 08:56

## 2021-05-05 RX ADMIN — METOPROLOL SUCCINATE 50 MG: 50 TABLET, EXTENDED RELEASE ORAL at 08:55

## 2021-05-05 RX ADMIN — LEVALBUTEROL HYDROCHLORIDE 1.25 MG: 1.25 SOLUTION, CONCENTRATE RESPIRATORY (INHALATION) at 08:10

## 2021-05-05 RX ADMIN — APIXABAN 5 MG: 5 TABLET, FILM COATED ORAL at 08:55

## 2021-05-05 RX ADMIN — OXYCODONE HYDROCHLORIDE AND ACETAMINOPHEN 1000 MG: 500 TABLET ORAL at 08:55

## 2021-05-05 RX ADMIN — TORSEMIDE 40 MG: 20 TABLET ORAL at 08:55

## 2021-05-05 RX ADMIN — POTASSIUM CHLORIDE 20 MEQ: 1500 TABLET, EXTENDED RELEASE ORAL at 08:55

## 2021-05-05 RX ADMIN — Medication 1000 UNITS: at 08:55

## 2021-05-05 RX ADMIN — CITALOPRAM HYDROBROMIDE 20 MG: 20 TABLET ORAL at 08:55

## 2021-05-05 RX ADMIN — Medication 400 MG: at 08:55

## 2021-05-05 RX ADMIN — FLUTICASONE PROPIONATE 1 SPRAY: 50 SPRAY, METERED NASAL at 08:56

## 2021-05-05 RX ADMIN — Medication 100 MCG: at 08:55

## 2021-05-05 NOTE — PROGRESS NOTES
Doris De La Vega  [id number]    56 y o   female      ASSESSMENT  1  Acute diverticulitis-clinically improving  Non complicated  2/10 pain now  Tolerating solid food  2  Microcytic anemia may be related to iron malabsorption status post gastric bypass  3  Gastric polyps by imaging study  Polyps were in the excluded area of the stomach may not be accessible by EGD  4  Obesity   5  History of colon polyps  Last examination the last several years 2-3 may be due for follow-up    PLAN  1  Will consult nutrition for patient Education  Low residue diet gradually increasing to low-fiber  2  Check iron panel and obtain ferritin level  Begin ferrous sulfate 325 daily as an outpatient  Patient previously on prior  Upcoming colonoscopy possible EGD  3  May need to follow by other might allergies i e  CT  4  Stable  5  Check records on previous colonoscopy     Chief Complaint   Patient presents with    Abdominal Pain     pt reports right lower abdominal pain that started yesterday, associated with some loose bowels  SUBJECTIVE/HPI reports 2/10 pain    /69 (BP Location: Right arm)   Pulse 80   Temp 98 4 °F (36 9 °C) (Oral)   Resp 19   Ht 5' 3" (1 6 m)   Wt 129 kg (285 lb 6 4 oz)   SpO2 96%   BMI 50 56 kg/m²     PHYSICALEXAM  General somewhat obese female no distress  Eyes anicteric  Neck no JVP or nodes  Chest clear to a and P  Cor S1-S2  Abdomen the most part soft    Mild right lower quadrant tenderness no guarding rebound no masses no  Extremities no edema  Skin no lesions    Lab Results   Component Value Date    GLUCOSE 131 (H) 09/22/2017    CALCIUM 8 5 05/05/2021     09/22/2017    K 3 7 05/05/2021    CO2 36 (H) 05/05/2021     05/05/2021    BUN 5 05/05/2021    CREATININE 0 78 05/05/2021     Lab Results   Component Value Date    WBC 8 81 05/05/2021    HGB 10 1 (L) 05/05/2021    HCT 34 2 (L) 05/05/2021    MCV 73 (L) 05/05/2021     05/05/2021     Lab Results   Component Value Date ALT 27 05/03/2021    AST 17 05/03/2021    ALKPHOS 157 (H) 05/03/2021    BILITOT 0 4 09/22/2017     No components found for: [id number]  Lab Results   Component Value Date    LIPASE 73 03/12/2020     Lab Results   Component Value Date    IRON 21 (L) 05/22/2020    TIBC 538 (H) 05/22/2020    FERRITIN 8 05/22/2020     Lab Results   Component Value Date    INR 1 16 05/21/2020

## 2021-05-05 NOTE — PLAN OF CARE
Problem: Potential for Falls  Goal: Patient will remain free of falls  Description: INTERVENTIONS:  - Assess patient frequently for physical needs  -  Identify cognitive and physical deficits and behaviors that affect risk of falls  -  [place] fall precautions as indicated by assessment   - Educate patient/family on patient safety including physical limitations  - Instruct patient to call for assistance with activity based on assessment  - Modify environment to reduce risk of injury  - Consider OT/PT consult to assist with strengthening/mobility  Outcome: Progressing     Problem: Nutrition/Hydration-ADULT  Goal: Nutrient/Hydration intake appropriate for improving, restoring or maintaining nutritional needs  Description: Monitor and assess patient's nutrition/hydration status for malnutrition  Collaborate with interdisciplinary team and initiate plan and interventions as ordered  Monitor patient's weight and dietary intake as ordered or per policy  Utilize nutrition screening tool and intervene as necessary  Determine patient's food preferences and provide high-protein, high-caloric foods as appropriate       INTERVENTIONS:  - Monitor oral intake, urinary output, labs, and treatment plans  - Assess nutrition and hydration status and recommend course of action  - Evaluate amount of meals eaten  - Assist patient with eating if necessary   - Allow adequate time for meals  - Recommend/ encourage appropriate diets, oral nutritional supplements, and vitamin/mineral supplements  - Order, calculate, and assess calorie counts as needed  - Recommend, monitor, and adjust tube feedings and TPN/PPN based on assessed needs  - Assess need for intravenous fluids  - Provide specific nutrition/hydration education as appropriate  - Include patient/family/caregiver in decisions related to nutrition  Outcome: Progressing     Problem: PAIN - ADULT  Goal: Verbalizes/displays adequate comfort level or baseline comfort level  Description: Interventions:  - Encourage patient to monitor pain and request assistance  - Assess pain using appropriate pain scale  - Administer analgesics based on type and severity of pain and evaluate response  - Implement non-pharmacological measures as appropriate and evaluate response  - Consider cultural and social influences on pain and pain management  - Notify physician/advanced practitioner if interventions unsuccessful or patient reports new pain  Outcome: Progressing     Problem: INFECTION - ADULT  Goal: Absence or prevention of progression during hospitalization  Description: INTERVENTIONS:  - Assess and monitor for signs and symptoms of infection  - Monitor lab/diagnostic results  - Monitor all insertion sites, i e  indwelling lines, tubes, and drains  - Monitor endotracheal if appropriate and nasal secretions for changes in amount and color  - [place] appropriate cooling/warming therapies per order  - Administer medications as ordered  - Instruct and encourage patient and family to use good hand hygiene technique  - Identify and instruct in appropriate isolation precautions for identified infection/condition  Outcome: Progressing     Problem: SAFETY ADULT  Goal: Patient will remain free of falls  Description: INTERVENTIONS:  - Assess patient frequently for physical needs  -  Identify cognitive and physical deficits and behaviors that affect risk of falls    -  [place] fall precautions as indicated by assessment   - Educate patient/family on patient safety including physical limitations  - Instruct patient to call for assistance with activity based on assessment  - Modify environment to reduce risk of injury  - Consider OT/PT consult to assist with strengthening/mobility  Outcome: Progressing  Goal: Maintain or return to baseline ADL function  Description: INTERVENTIONS:  -  Assess patient's ability to carry out ADLs; assess patient's baseline for ADL function and identify physical deficits which impact ability to perform ADLs (bathing, care of mouth/teeth, toileting, grooming, dressing, etc )  - Assess/evaluate cause of self-care deficits   - Assess range of motion  - Assess patient's mobility; develop plan if impaired  - Assess patient's need for assistive devices and provide as appropriate  - Encourage maximum independence but intervene and supervise when necessary  - Involve family in performance of ADLs  - Assess for home care needs following discharge   - Consider OT consult to assist with ADL evaluation and planning for discharge  - Provide patient education as appropriate  Outcome: Progressing     Problem: DISCHARGE PLANNING  Goal: Discharge to home or other facility with appropriate resources  Description: INTERVENTIONS:  - Identify barriers to discharge w/patient and caregiver  - Arrange for needed discharge resources and transportation as appropriate  - Identify discharge learning needs (meds, wound care, etc )  - Arrange for interpretive services to assist at discharge as needed  - Refer to Case Management Department for coordinating discharge planning if the patient needs post-hospital services based on physician/advanced practitioner order or complex needs related to functional status, cognitive ability, or social support system  Outcome: Progressing     Problem: Knowledge Deficit  Goal: Patient/family/caregiver demonstrates understanding of disease process, treatment plan, medications, and discharge instructions  Description: Complete learning assessment and assess knowledge base    Interventions:  - Provide teaching at level of understanding  - Provide teaching via preferred learning methods  Outcome: Progressing     Problem: RESPIRATORY - ADULT  Goal: Achieves optimal ventilation and oxygenation  Description: INTERVENTIONS:  - Assess for changes in respiratory status  - Assess for changes in mentation and behavior  - Position to facilitate oxygenation and minimize respiratory effort  - Oxygen administered by appropriate delivery if ordered  - Initiate smoking cessation education as indicated  - Encourage broncho-pulmonary hygiene including cough, deep breathe, Incentive Spirometry  - Assess the need for suctioning and aspirate as needed  - Assess and instruct to report SOB or any respiratory difficulty  - Respiratory Therapy support as indicated  Outcome: Progressing     Problem: GASTROINTESTINAL - ADULT  Goal: Minimal or absence of nausea and/or vomiting  Description: INTERVENTIONS:  - Administer IV fluids if ordered to ensure adequate hydration  - Maintain NPO status until nausea and vomiting are resolved  - Nasogastric tube if ordered  - Administer ordered antiemetic medications as needed  - Provide nonpharmacologic comfort measures as appropriate  - Advance diet as tolerated, if ordered  - Consider nutrition services referral to assist patient with adequate nutrition and appropriate food choices  Outcome: Progressing  Goal: Maintains or returns to baseline bowel function  Description: INTERVENTIONS:  - Assess bowel function  - Encourage oral fluids to ensure adequate hydration  - Administer IV fluids if ordered to ensure adequate hydration  - Administer ordered medications as needed  - Encourage mobilization and activity  - Consider nutritional services referral to assist patient with adequate nutrition and appropriate food choices  Outcome: Progressing  Goal: Maintains adequate nutritional intake  Description: INTERVENTIONS:  - Monitor percentage of each meal consumed  - Identify factors contributing to decreased intake, treat as appropriate  - Assist with meals as needed  - Monitor I&O, weight, and lab values if indicated  - Obtain nutrition services referral as needed  Outcome: Progressing     Problem: METABOLIC, FLUID AND ELECTROLYTES - ADULT  Goal: Electrolytes maintained within normal limits  Description: INTERVENTIONS:  - Monitor labs and assess patient for signs and symptoms of electrolyte imbalances  - Administer electrolyte replacement as ordered  - Monitor response to electrolyte replacements, including repeat lab results as appropriate  - Instruct patient on fluid and nutrition as appropriate  Outcome: Progressing     Problem: MUSCULOSKELETAL - ADULT  Goal: Maintain or return mobility to safest level of function  Description: INTERVENTIONS:  - Assess patient's ability to carry out ADLs; assess patient's baseline for ADL function and identify physical deficits which impact ability to perform ADLs (bathing, care of mouth/teeth, toileting, grooming, dressing, etc )  - Assess/evaluate cause of self-care deficits   - Assess range of motion  - Assess patient's mobility  - Assess patient's need for assistive devices and provide as appropriate  - Encourage maximum independence but intervene and supervise when necessary  - Involve family in performance of ADLs  - Assess for home care needs following discharge   - Consider OT consult to assist with ADL evaluation and planning for discharge  - Provide patient education as appropriate  Outcome: Progressing

## 2021-05-05 NOTE — DISCHARGE SUMMARY
Discharge Summary - [facility]    Patient Information: Jermaine Rodas 56 y o  female MRN: [id number]  Unit/Bed#: -01 Encounter: [id number]    Discharging Physician / Practitioner: Alec Ponce MD  PCP: Garrett Castillo MD  Admission Date: 5/2/2021  Discharge Date: 05/05/21    Disposition:     Home    Reason for Admission:  Abdominal pain    Discharge Diagnoses:     Principal Problem:    Acute diverticulitis  Active Problems:    Benign essential hypertension    Obstructive sleep apnea    Hypercholesterolemia    Type 2 diabetes mellitus, without long-term current use of insulin (HCC)    Chronic respiratory failure with hypoxia (HCC)    Atrial fibrillation ([facility] )    Class 3 severe obesity in adult [facility])    Gastric polyp  Resolved Problems:    Lactic acidosis    Sepsis ([facility] )      Consultations During Hospital Stay:  · Gastroenterology    Procedures Performed:     · CT abdomen and pelvis acute diverticulitis mid sigmoid colon noted  Polyps noted in gastric body      Hospital Course:     Jermaine Rodas is a 56 y o  female patient who originally presented to the hospital on 5/2/2021 due to  abdominal pain for 2 days  She also reported chills and sweating  Had some loose stools  No blood in the stool  Denied any nausea vomiting  Patient was subsequently admitted  She had CT abdomen and pelvis which showed diverticulitis  She was placed on IV antibiotics with improvement in symptomatology  Patient was seen by Gastroenterology and recommended outpatient colonoscopy and EGD  Patient is being discharged with p o  Antibiotics with outpatient GI follow-up      Condition at Discharge: good     Discharge Day Visit / Exam:     Subjective:    Vitals: Blood Pressure: 155/69 (05/05/21 0804)  Pulse: 80 (05/05/21 0804)  Temperature: 98 4 °F (36 9 °C) (05/05/21 0804)  Temp Source: Oral (05/05/21 0804)  Respirations: 19 (05/05/21 0804)  Height: 5' 3" (160 cm) (05/02/21 1843)  Weight - Scale: 129 kg (285 lb 6 4 oz) (05/02/21 1843)  SpO2: 96 % (05/05/21 0812)  Exam:   Physical Exam     Gen -Patient comfortable   Neck- Supple  No thyromegaly or lymphadenopathy  Lungs-Clear bilaterally without any wheeze or rales   Heart S1-S2, regular rate and rhythm, no murmurs  Abdomen-soft nontender, no organomegaly  Bowel sounds present  Extremities-no cyanosi,  clubbing or edema  Skin- no rash  Neuro-nonfocal       Discussion with Family:     Discharge instructions/Information to patient and family:   See after visit summary for information provided to patient and family  Provisions for Follow-Up Care:  See after visit summary for information related to follow-up care and any pertinent home health orders  Planned Readmission: no     Discharge Statement:  I spent 35 minutes discharging the patient  This time was spent on the day of discharge  I had direct contact with the patient on the day of discharge  Greater than 50% of the total time was spent examining patient, answering all patient questions, arranging and discussing plan of care with patient as well as directly providing post-discharge instructions  Additional time then spent on discharge activities  Discharge Medications:  See after visit summary for reconciled discharge medications provided to patient and family        ** Please Note: This note has been constructed using a voice recognition system **

## 2021-05-05 NOTE — CONSULTS
Diet  education: reviewed low fiber low residue diet, written material provided and RD phone number for future questions

## 2021-05-05 NOTE — PLAN OF CARE
Problem: Potential for Falls  Goal: Patient will remain free of falls  Description: INTERVENTIONS:  - Assess patient frequently for physical needs  -  Identify cognitive and physical deficits and behaviors that affect risk of falls  -  [place] fall precautions as indicated by assessment   - Educate patient/family on patient safety including physical limitations  - Instruct patient to call for assistance with activity based on assessment  - Modify environment to reduce risk of injury  - Consider OT/PT consult to assist with strengthening/mobility  5/5/2021 1253 by Carlos Alberto Simon RN  Outcome: Adequate for Discharge  5/5/2021 0949 by Carlos Alberto Simon RN  Outcome: Progressing     Problem: Nutrition/Hydration-ADULT  Goal: Nutrient/Hydration intake appropriate for improving, restoring or maintaining nutritional needs  Description: Monitor and assess patient's nutrition/hydration status for malnutrition  Collaborate with interdisciplinary team and initiate plan and interventions as ordered  Monitor patient's weight and dietary intake as ordered or per policy  Utilize nutrition screening tool and intervene as necessary  Determine patient's food preferences and provide high-protein, high-caloric foods as appropriate       INTERVENTIONS:  - Monitor oral intake, urinary output, labs, and treatment plans  - Assess nutrition and hydration status and recommend course of action  - Evaluate amount of meals eaten  - Assist patient with eating if necessary   - Allow adequate time for meals  - Recommend/ encourage appropriate diets, oral nutritional supplements, and vitamin/mineral supplements  - Order, calculate, and assess calorie counts as needed  - Recommend, monitor, and adjust tube feedings and TPN/PPN based on assessed needs  - Assess need for intravenous fluids  - Provide specific nutrition/hydration education as appropriate  - Include patient/family/caregiver in decisions related to nutrition  5/5/2021 1253 by Carlos Alberto Simon RN  Outcome: Adequate for Discharge  5/5/2021 0949 by Kenton Reed RN  Outcome: Progressing     Problem: PAIN - ADULT  Goal: Verbalizes/displays adequate comfort level or baseline comfort level  Description: Interventions:  - Encourage patient to monitor pain and request assistance  - Assess pain using appropriate pain scale  - Administer analgesics based on type and severity of pain and evaluate response  - Implement non-pharmacological measures as appropriate and evaluate response  - Consider cultural and social influences on pain and pain management  - Notify physician/advanced practitioner if interventions unsuccessful or patient reports new pain  5/5/2021 1253 by Kenton Reed RN  Outcome: Adequate for Discharge  5/5/2021 0949 by Kenton Reed RN  Outcome: Progressing     Problem: INFECTION - ADULT  Goal: Absence or prevention of progression during hospitalization  Description: INTERVENTIONS:  - Assess and monitor for signs and symptoms of infection  - Monitor lab/diagnostic results  - Monitor all insertion sites, i e  indwelling lines, tubes, and drains  - Monitor endotracheal if appropriate and nasal secretions for changes in amount and color  - [place] appropriate cooling/warming therapies per order  - Administer medications as ordered  - Instruct and encourage patient and family to use good hand hygiene technique  - Identify and instruct in appropriate isolation precautions for identified infection/condition  5/5/2021 1253 by Kenton Reed RN  Outcome: Adequate for Discharge  5/5/2021 0949 by Kenton Reed RN  Outcome: Progressing     Problem: SAFETY ADULT  Goal: Patient will remain free of falls  Description: INTERVENTIONS:  - Assess patient frequently for physical needs  -  Identify cognitive and physical deficits and behaviors that affect risk of falls    -  [place] fall precautions as indicated by assessment   - Educate patient/family on patient safety including physical limitations  - Instruct patient to call for assistance with activity based on assessment  - Modify environment to reduce risk of injury  - Consider OT/PT consult to assist with strengthening/mobility  5/5/2021 1253 by Rosalind Lebron RN  Outcome: Adequate for Discharge  5/5/2021 0949 by Rosalind Lebron RN  Outcome: Progressing  Goal: Maintain or return to baseline ADL function  Description: INTERVENTIONS:  -  Assess patient's ability to carry out ADLs; assess patient's baseline for ADL function and identify physical deficits which impact ability to perform ADLs (bathing, care of mouth/teeth, toileting, grooming, dressing, etc )  - Assess/evaluate cause of self-care deficits   - Assess range of motion  - Assess patient's mobility; develop plan if impaired  - Assess patient's need for assistive devices and provide as appropriate  - Encourage maximum independence but intervene and supervise when necessary  - Involve family in performance of ADLs  - Assess for home care needs following discharge   - Consider OT consult to assist with ADL evaluation and planning for discharge  - Provide patient education as appropriate  5/5/2021 1253 by Rosalind Lebron RN  Outcome: Adequate for Discharge  5/5/2021 0949 by Rosalind Lebron RN  Outcome: Progressing     Problem: DISCHARGE PLANNING  Goal: Discharge to home or other facility with appropriate resources  Description: INTERVENTIONS:  - Identify barriers to discharge w/patient and caregiver  - Arrange for needed discharge resources and transportation as appropriate  - Identify discharge learning needs (meds, wound care, etc )  - Arrange for interpretive services to assist at discharge as needed  - Refer to Case Management Department for coordinating discharge planning if the patient needs post-hospital services based on physician/advanced practitioner order or complex needs related to functional status, cognitive ability, or social support system  5/5/2021 1253 by Rosalind Lebron RN  Outcome: Adequate for Discharge  5/5/2021 0949 by Giovanny Burnett RN  Outcome: Progressing     Problem: Knowledge Deficit  Goal: Patient/family/caregiver demonstrates understanding of disease process, treatment plan, medications, and discharge instructions  Description: Complete learning assessment and assess knowledge base    Interventions:  - Provide teaching at level of understanding  - Provide teaching via preferred learning methods  5/5/2021 1253 by Giovanny Burnett RN  Outcome: Adequate for Discharge  5/5/2021 0949 by Giovanny Burnett RN  Outcome: Progressing     Problem: RESPIRATORY - ADULT  Goal: Achieves optimal ventilation and oxygenation  Description: INTERVENTIONS:  - Assess for changes in respiratory status  - Assess for changes in mentation and behavior  - Position to facilitate oxygenation and minimize respiratory effort  - Oxygen administered by appropriate delivery if ordered  - Initiate smoking cessation education as indicated  - Encourage broncho-pulmonary hygiene including cough, deep breathe, Incentive Spirometry  - Assess the need for suctioning and aspirate as needed  - Assess and instruct to report SOB or any respiratory difficulty  - Respiratory Therapy support as indicated  5/5/2021 1253 by Giovanny Burnett RN  Outcome: Adequate for Discharge  5/5/2021 0949 by Giovanny Burnett RN  Outcome: Progressing     Problem: GASTROINTESTINAL - ADULT  Goal: Minimal or absence of nausea and/or vomiting  Description: INTERVENTIONS:  - Administer IV fluids if ordered to ensure adequate hydration  - Maintain NPO status until nausea and vomiting are resolved  - Nasogastric tube if ordered  - Administer ordered antiemetic medications as needed  - Provide nonpharmacologic comfort measures as appropriate  - Advance diet as tolerated, if ordered  - Consider nutrition services referral to assist patient with adequate nutrition and appropriate food choices  5/5/2021 1253 by Giovanny Burnett RN  Outcome: Adequate for Discharge  5/5/2021 0949 by Niesha Dey Diego Malloy RN  Outcome: Progressing  Goal: Maintains or returns to baseline bowel function  Description: INTERVENTIONS:  - Assess bowel function  - Encourage oral fluids to ensure adequate hydration  - Administer IV fluids if ordered to ensure adequate hydration  - Administer ordered medications as needed  - Encourage mobilization and activity  - Consider nutritional services referral to assist patient with adequate nutrition and appropriate food choices  5/5/2021 1253 by Christine Cartwright RN  Outcome: Adequate for Discharge  5/5/2021 0949 by Christine Cartwright RN  Outcome: Progressing  Goal: Maintains adequate nutritional intake  Description: INTERVENTIONS:  - Monitor percentage of each meal consumed  - Identify factors contributing to decreased intake, treat as appropriate  - Assist with meals as needed  - Monitor I&O, weight, and lab values if indicated  - Obtain nutrition services referral as needed  5/5/2021 1253 by Christine Cartwright RN  Outcome: Adequate for Discharge  5/5/2021 0949 by Christine Cartwright RN  Outcome: Progressing     Problem: METABOLIC, FLUID AND ELECTROLYTES - ADULT  Goal: Electrolytes maintained within normal limits  Description: INTERVENTIONS:  - Monitor labs and assess patient for signs and symptoms of electrolyte imbalances  - Administer electrolyte replacement as ordered  - Monitor response to electrolyte replacements, including repeat lab results as appropriate  - Instruct patient on fluid and nutrition as appropriate  5/5/2021 1253 by Christine Cartwright RN  Outcome: Adequate for Discharge  5/5/2021 0949 by Christine Cartwright RN  Outcome: Progressing     Problem: MUSCULOSKELETAL - ADULT  Goal: Maintain or return mobility to safest level of function  Description: INTERVENTIONS:  - Assess patient's ability to carry out ADLs; assess patient's baseline for ADL function and identify physical deficits which impact ability to perform ADLs (bathing, care of mouth/teeth, toileting, grooming, dressing, etc )  - Assess/evaluate cause of self-care deficits   - Assess range of motion  - Assess patient's mobility  - Assess patient's need for assistive devices and provide as appropriate  - Encourage maximum independence but intervene and supervise when necessary  - Involve family in performance of ADLs  - Assess for home care needs following discharge   - Consider OT consult to assist with ADL evaluation and planning for discharge  - Provide patient education as appropriate  5/5/2021 1253 by Kenton Reed RN  Outcome: Adequate for Discharge  5/5/2021 0949 by Kenton Reed RN  Outcome: Progressing

## 2021-05-06 ENCOUNTER — TELEPHONE (OUTPATIENT)
Dept: GASTROENTEROLOGY | Facility: CLINIC | Age: 61
End: 2021-05-06

## 2021-05-06 ENCOUNTER — TRANSITIONAL CARE MANAGEMENT (OUTPATIENT)
Dept: FAMILY MEDICINE CLINIC | Facility: HOSPITAL | Age: 61
End: 2021-05-06

## 2021-05-06 NOTE — TELEPHONE ENCOUNTER
Per Dr Dick Lebron, please arrange an EGD and COLON for [facility] in 6 weeks at [address]   She was discharged from [address] 05/05/2021    Thank you

## 2021-05-07 ENCOUNTER — TELEPHONE (OUTPATIENT)
Dept: FAMILY MEDICINE CLINIC | Facility: HOSPITAL | Age: 61
End: 2021-05-07

## 2021-05-07 LAB
BACTERIA BLD CULT: NORMAL
BACTERIA BLD CULT: NORMAL

## 2021-05-07 RX ORDER — METFORMIN HYDROCHLORIDE 500 MG/1
1000 TABLET, EXTENDED RELEASE ORAL 2 TIMES DAILY WITH MEALS
Qty: 120 TABLET | Refills: 5 | Status: ON HOLD | OUTPATIENT
Start: 2021-05-07 | End: 2021-06-02 | Stop reason: SDUPTHER

## 2021-05-07 RX ORDER — LORAZEPAM 0.5 MG/1
TABLET ORAL
Qty: 90 TABLET | Refills: 0 | Status: ON HOLD | OUTPATIENT
Start: 2021-05-07 | End: 2021-06-02 | Stop reason: SDUPTHER

## 2021-05-07 RX ORDER — TRAZODONE HYDROCHLORIDE 150 MG/1
150 TABLET ORAL
Qty: 90 TABLET | Refills: 0 | Status: SHIPPED | OUTPATIENT
Start: 2021-05-07 | End: 2021-05-09

## 2021-05-07 NOTE — TELEPHONE ENCOUNTER
Calling to notify the office that patient will be receiving homehealth nursing and PT  Also, Magnesium is not on her d/c med list from the hospital  There is a drug interaction between two of the patient's medications, Citalopram and Flecainide

## 2021-05-07 NOTE — TELEPHONE ENCOUNTER
OTC monistat cream - may have yeast infection from the amoxil and that is causing irritation - use OTC monistat for symptomatic irritation as well as tx of yeast infection, also avoid scented soaps/bubble baths and can use vaginal wipes that may be less irritating then toilet paper

## 2021-05-07 NOTE — TELEPHONE ENCOUNTER
pts entire vaginal area is "raw " it hurts to wipe and she attributes this possibly to the antibiotic she is on  ammoxicillin 875-125mgs from her hospital stay for diverticulitis  What can we advise?  pts pain started yesterday

## 2021-05-08 DIAGNOSIS — G47.00 INSOMNIA, UNSPECIFIED TYPE: ICD-10-CM

## 2021-05-09 RX ORDER — TRAZODONE HYDROCHLORIDE 150 MG/1
TABLET ORAL
Qty: 90 TABLET | Refills: 0 | Status: SHIPPED | OUTPATIENT
Start: 2021-05-09 | End: 2021-10-21 | Stop reason: SDUPTHER

## 2021-05-10 NOTE — TELEPHONE ENCOUNTER
There is drug interaction between the two medications  I do think the risk is low  She would need to call cardiology if flecainide is to be changed  We can talk about removing or change citalopram at her vist which she should have

## 2021-05-10 NOTE — UTILIZATION REVIEW
Notification of Discharge   This is a Notification of Discharge from our facility [address]  Please be advised that this patient has been discharge from our facility  Below you will find the admission and discharge date and time including the patients disposition  UTILIZATION REVIEW CONTACT:  Jackson Laurent  Utilization   Network Utilization Review Department  Phone: [phone] x carefully listen to the prompts  All voicemails are confidential   Email: [email or web address]     PHYSICIAN ADVISORY SERVICES:  FOR [facility] REVIEW - MEDICAL NECESSITY DENIAL  Phone: [phone]  Fax: [phone]  Email: [email or web address]     PRESENTATION DATE: 5/2/2021  1:59 PM  OBERVATION ADMISSION DATE:   INPATIENT ADMISSION DATE: 5/2/21 1617   DISCHARGE DATE: 5/5/2021  2:09 PM  DISPOSITION: Home/Self Care Home/Self Care      IMPORTANT INFORMATION:  Send all requests for admission clinical reviews, approved or denied determinations and any other requests to dedicated fax number below belonging to the campus where the patient is receiving treatment   List of dedicated fax numbers:  [address] DENIALS (Administrative/Medical Necessity) [phone]   [address] (Maternity/NICU/Pediatrics) [phone]   Sarah Fines [phone]   [facility] [phone]   Tiny Thai [phone]   Jessica Varma [address] [facility] [phone]   [facility]  [phone]   [address] [facility] [address] [facility]  [address] [address] [address] [facility] [phone]

## 2021-05-13 ENCOUNTER — TELEPHONE (OUTPATIENT)
Dept: GASTROENTEROLOGY | Facility: CLINIC | Age: 61
End: 2021-05-13

## 2021-05-13 ENCOUNTER — OFFICE VISIT (OUTPATIENT)
Dept: FAMILY MEDICINE CLINIC | Facility: HOSPITAL | Age: 61
End: 2021-05-13
Payer: COMMERCIAL

## 2021-05-13 VITALS
BODY MASS INDEX: 49.79 KG/M2 | OXYGEN SATURATION: 96 % | DIASTOLIC BLOOD PRESSURE: 60 MMHG | TEMPERATURE: 98.4 F | HEART RATE: 80 BPM | WEIGHT: 281 LBS | SYSTOLIC BLOOD PRESSURE: 130 MMHG | HEIGHT: 63 IN

## 2021-05-13 DIAGNOSIS — D50.8 OTHER IRON DEFICIENCY ANEMIA: Primary | ICD-10-CM

## 2021-05-13 DIAGNOSIS — E66.01 CLASS 3 SEVERE OBESITY WITHOUT SERIOUS COMORBIDITY WITH BODY MASS INDEX (BMI) OF 50.0 TO 59.9 IN ADULT, UNSPECIFIED OBESITY TYPE (HCC): ICD-10-CM

## 2021-05-13 DIAGNOSIS — G47.33 OBSTRUCTIVE SLEEP APNEA: ICD-10-CM

## 2021-05-13 DIAGNOSIS — J44.9 ASTHMA-COPD OVERLAP SYNDROME (HCC): ICD-10-CM

## 2021-05-13 DIAGNOSIS — I48.91 ATRIAL FIBRILLATION, UNSPECIFIED TYPE (HCC): ICD-10-CM

## 2021-05-13 DIAGNOSIS — K57.92 ACUTE DIVERTICULITIS: ICD-10-CM

## 2021-05-13 DIAGNOSIS — J96.11 CHRONIC RESPIRATORY FAILURE WITH HYPOXIA (HCC): ICD-10-CM

## 2021-05-13 DIAGNOSIS — E11.65 TYPE 2 DIABETES MELLITUS WITH HYPERGLYCEMIA, WITHOUT LONG-TERM CURRENT USE OF INSULIN (HCC): Primary | ICD-10-CM

## 2021-05-13 DIAGNOSIS — I50.32 CHRONIC DIASTOLIC CONGESTIVE HEART FAILURE (HCC): ICD-10-CM

## 2021-05-13 DIAGNOSIS — F32.1 CURRENT MODERATE EPISODE OF MAJOR DEPRESSIVE DISORDER WITHOUT PRIOR EPISODE (HCC): ICD-10-CM

## 2021-05-13 PROBLEM — M65.312 TRIGGER THUMB, LEFT THUMB: Status: RESOLVED | Noted: 2017-12-14 | Resolved: 2021-05-13

## 2021-05-13 PROBLEM — Z72.0 TOBACCO USE: Status: RESOLVED | Noted: 2018-02-20 | Resolved: 2021-05-13

## 2021-05-13 PROBLEM — M65.332 TRIGGER MIDDLE FINGER OF LEFT HAND: Status: RESOLVED | Noted: 2017-12-14 | Resolved: 2021-05-13

## 2021-05-13 PROBLEM — M65.342 TRIGGER RING FINGER OF LEFT HAND: Status: RESOLVED | Noted: 2017-12-14 | Resolved: 2021-05-13

## 2021-05-13 PROCEDURE — 99214 OFFICE O/P EST MOD 30 MIN: CPT | Performed by: FAMILY MEDICINE

## 2021-05-13 RX ORDER — FERROUS SULFATE 220 (44)/5
220 ELIXIR ORAL
Qty: 473 ML | Refills: 2 | Status: SHIPPED | OUTPATIENT
Start: 2021-05-13 | End: 2022-02-01

## 2021-05-13 NOTE — TELEPHONE ENCOUNTER
I called the patient and reviewed lab studies  Patient with a hemoglobin low 10 g range in iron deficiency  She did have gastric bypass so she may well have iron malabsorption  Should be due for colonoscopy after her recent bout of diverticulitis in about 6 to 8 weeks  Would like to see the patient in the office prior  Probably should have EGD and colonoscopy at that time  Recheck CBC iron panel and ferritin in  a month  Did E prescribd oral iron in liquid form to her pharmacy  Has polyps in her stomach but not accessible by regular upper endoscopy  May not be a bleeding source regardless

## 2021-05-13 NOTE — PROGRESS NOTES
Patient is here for  TRANSITIONAL CARE MANAGEMENT      Problem List Items Addressed This Visit        Digestive    Acute diverticulitis       Endocrine    Type 2 diabetes mellitus, without long-term current use of insulin ([facility] [address] ) - Primary    Relevant Orders    Lipid Panel with Direct LDL reflex    Hemoglobin A1C    Microalbumin / creatinine urine ratio    CBC    Comprehensive metabolic panel    TSH, 3rd generation with Free T4 reflex       Respiratory    Asthma-COPD overlap syndrome (HCC)    Chronic respiratory failure with hypoxia (HCC)    Obstructive sleep apnea       Cardiovascular and Mediastinum    Atrial fibrillation (HCC)    Chronic diastolic congestive heart failure (HCC)       Other    Class 3 severe obesity in adult [facility])    Current moderate episode of major depressive disorder without prior episode ([facility] [address] )      1  Acute diverticultis  Improving  Has some mild diarrhea  On a low fiber diet as directed by GI  Will be following up with GI  She has yet to call for apt      2  COPD/asthma with chronic hypoxia and o2 dependent  currenlty stable  Continue with her regimen  Advised continue fu with Pulmnology  3  CHF  Stable  currenlty euvolemic  Continue with the same  4  Afib  On eliquis as well as tambocor  Has not had any AR with its use along with celexa  Benefit > risk at this point  Continue to monitor  cotinue regular fu with cardiology  5  MDD  Stable  continue same regimen  6  Anemia  Iron deficient  ? Secondary malabsorption as s/p gastric bypass  ? Due to acute diverticulitis or other Gi bleed  Will ocntinue with iron supplementations  Await GI eval  Plan for cscope in about 6 weeks along with egd  Return in about 4 weeks (around 6/10/2021)         To call our office if any concerns/questions at [phone]     -------------------------------------------------------------------------------------------------------------      TCM Call (since 4/12/2021)     Date and time call was made  5/6/2021  2:09 PM    Patient was hospitialized at  [address]  [facility]        Date of Admission  05/02/21    Date of discharge  05/05/21    Diagnosis  diverticulitis    Disposition  Home    Current Symptoms  Loose Stools; Neausea    Loose stool severity  Severe    Neausea severity  Moderate      TCM Call (since 4/12/2021)     Post hospital issues  Reduced activity    Referrals needed  GI   6 weeks    Did you obtain your prescribed medications  Yes    Do you need help managing your prescriptions or medications  No    Is transportation to your appointment needed  No    I have advised the patient to call PCP with any new or worsening symptoms  [address] [employer]    Do you have social support  Yes, as much as I need    Are you recieving any outpatient services  No    Are you recieving home care services  Yes    Types of home care services  Nurse visit    Are you using any community resources  No    Current waiver services  No    Have you fallen in the last 12 months  No    Interperter language line needed  No    Counseling  Patient               Pt seen for tcm  Was recently admitted for acute diverticulitis after presenting to Er for abdominal pain  Reviewed hospital records  Reviewed GI consultations  Reviewed labs and imaging  She is doing better  She is on a low fiber diet  No current abdominal pain  Has some mild diarrhea  No nausea, no vomting  Known COPD and chronic hypoxia, afib, chf, htn, mdd  Reports no new issues regarding this  Taking her medications regularly  Review of Systems   Constitutional: Negative  Negative for activity change, appetite change, chills, diaphoresis, fatigue and fever  HENT: Negative for congestion, facial swelling and sore throat  Respiratory: Negative  Negative for apnea, cough, chest tightness and shortness of breath  Cardiovascular: Negative  Negative for chest pain and palpitations  Gastrointestinal: Positive for diarrhea  Negative for abdominal distention, abdominal pain, blood in stool, constipation and nausea  Genitourinary: Negative  Negative for difficulty urinating, dysuria, flank pain and frequency         Social History     Socioeconomic History    Marital status: Significant Other     Spouse name: Not on file    Number of children: Not on file    Years of education: Not on file    Highest education level: Not on file   Occupational History    Not on file   Social Needs    Financial resource strain: Not on file    Food insecurity     Worry: Not on file     Inability: Not on file    Transportation needs     Medical: Not on file     Non-medical: Not on file   Tobacco Use    Smoking status: Former Smoker     Packs/day: 0 25     Years: 29 00     Pack years: 7 25     Types: Cigarettes     Start date: 200     Quit date: 12/10/2019     Years since quittin 4    Smokeless tobacco: Never Used   Substance and Sexual Activity    Alcohol use: Not Currently     Alcohol/week: 20 0 standard drinks     Types: 20 Cans of beer per week     Frequency: 4 or more times a week     Drinks per session: 3 or 4     Binge frequency: Never     Comment: about 6 coors light every night, stopped 3 weeks ago     Drug use: No    Sexual activity: Not Currently   Lifestyle    Physical activity     Days per week: Not on file     Minutes per session: Not on file    Stress: Not on file   Relationships    Social connections     Talks on phone: Not on file     Gets together: Not on file     Attends [employer] service: Not on file     Active member of club or organization: Not on file     Attends meetings of clubs or organizations: Not on file     Relationship status: Not on file    Intimate partner violence     Fear of current or ex partner: Not on file     Emotionally abused: Not on file     Physically abused: Not on file Forced sexual activity: Not on file   Other Topics Concern    Not on file   Social History Narrative    Not on file               Allergies   Allergen Reactions    Iron Dextran Swelling    Januvia [Sitagliptin] Shortness Of Breath    Jardiance [Empagliflozin] Shortness Of Breath    Clonazepam Other (See Comments)     Unknown reaction    Codeine Itching and Other (See Comments)     itch;mary kay morphine,takes ultram @home    Adhesive [Medical Tape] Itching     itching    Effexor [Venlafaxine] Itching    Lactose - Food Allergy GI Intolerance    Oxycodone-Acetaminophen Anxiety    Oxycodone-Acetaminophen Itching and Rash     Other reaction(s): Other (See Comments)  (PERCOCET) MILD RASH, not allergic to Acetaminophen            Vitals:    05/13/21 1329   BP: 130/60   Pulse: 80   Temp: 98 4 °F (36 9 °C)   SpO2: 96%     BP Readings from Last 3 Encounters:   05/13/21 130/60   05/05/21 155/69   04/26/21 120/70      Wt Readings from Last 3 Encounters:   05/13/21 127 kg (281 lb)   05/02/21 129 kg (285 lb 6 4 oz)   04/26/21 131 kg (289 lb)        Physical Exam  Vitals signs and nursing note reviewed  Constitutional:       Appearance: Normal appearance  She is well-developed  She is obese  She is not ill-appearing or diaphoretic  Comments: On oxygen via [place]   HENT:      Head: Normocephalic and atraumatic  Right Ear: External ear normal       Left Ear: External ear normal       Nose: Nose normal       Mouth/Throat:      Mouth: Mucous membranes are moist    Eyes:      Conjunctiva/sclera: Conjunctivae normal       Pupils: Pupils are equal, round, and reactive to light  Neck:      Musculoskeletal: Normal range of motion and neck supple  Cardiovascular:      Rate and Rhythm: Normal rate and regular rhythm  Heart sounds: Normal heart sounds  Pulmonary:      Effort: Pulmonary effort is normal       Breath sounds: Normal breath sounds     Abdominal:      General: Bowel sounds are normal       Palpations: Abdomen is soft  Skin:     General: Skin is warm and dry  Neurological:      General: No focal deficit present  Mental Status: She is alert and oriented to person, place, and time  Psychiatric:         Mood and Affect: Mood normal          Behavior: Behavior normal                      Return in about 4 weeks (around 6/10/2021)

## 2021-05-14 ENCOUNTER — TELEPHONE (OUTPATIENT)
Dept: FAMILY MEDICINE CLINIC | Facility: HOSPITAL | Age: 61
End: 2021-05-14

## 2021-05-14 NOTE — TELEPHONE ENCOUNTER
Home care called, they went out to see pt, she is reporting abdominal pain and diarrhea  Pt states this was discussed at her recent appt  Is taking tums and unique, home care nurse was going to add to her med list  According to note in computer, pt is on a wait list for July to see GI dr Sotero Krabbe care just wanted to make you aware of her abdominal pain

## 2021-05-20 DIAGNOSIS — E11.69 DIABETES MELLITUS TYPE 2 IN OBESE (HCC): ICD-10-CM

## 2021-05-20 DIAGNOSIS — E66.9 DIABETES MELLITUS TYPE 2 IN OBESE (HCC): ICD-10-CM

## 2021-05-20 RX ORDER — ATORVASTATIN CALCIUM 40 MG/1
40 TABLET, FILM COATED ORAL DAILY
Qty: 30 TABLET | Refills: 0 | Status: SHIPPED | OUTPATIENT
Start: 2021-05-20 | End: 2021-06-13

## 2021-05-24 ENCOUNTER — HOSPITAL ENCOUNTER (EMERGENCY)
Facility: HOSPITAL | Age: 61
Discharge: HOME | End: 2021-05-24
Attending: EMERGENCY MEDICINE | Admitting: EMERGENCY MEDICINE
Payer: COMMERCIAL

## 2021-05-24 ENCOUNTER — APPOINTMENT (EMERGENCY)
Dept: CT IMAGING | Facility: HOSPITAL | Age: 61
End: 2021-05-24
Payer: COMMERCIAL

## 2021-05-24 VITALS
RESPIRATION RATE: 20 BRPM | TEMPERATURE: 98.5 F | SYSTOLIC BLOOD PRESSURE: 140 MMHG | HEART RATE: 89 BPM | OXYGEN SATURATION: 95 % | DIASTOLIC BLOOD PRESSURE: 65 MMHG

## 2021-05-24 DIAGNOSIS — R74.01 TRANSAMINITIS: ICD-10-CM

## 2021-05-24 DIAGNOSIS — K57.92 ACUTE DIVERTICULITIS: ICD-10-CM

## 2021-05-24 DIAGNOSIS — K57.32 SIGMOID DIVERTICULITIS: Primary | ICD-10-CM

## 2021-05-24 LAB
ALBUMIN SERPL BCP-MCNC: 3.2 G/DL (ref 3.5–5)
ALP SERPL-CCNC: 214 U/L (ref 46–116)
ALT SERPL W P-5'-P-CCNC: 139 U/L (ref 12–78)
ANION GAP SERPL CALCULATED.3IONS-SCNC: 7 MMOL/L (ref 4–13)
AST SERPL W P-5'-P-CCNC: 120 U/L (ref 5–45)
BASOPHILS # BLD AUTO: 0.05 THOUSANDS/ΜL (ref 0–0.1)
BASOPHILS NFR BLD AUTO: 0 % (ref 0–1)
BILIRUB SERPL-MCNC: 0.4 MG/DL (ref 0.2–1)
BUN SERPL-MCNC: 5 MG/DL (ref 5–25)
CALCIUM ALBUM COR SERPL-MCNC: 9.1 MG/DL (ref 8.3–10.1)
CALCIUM SERPL-MCNC: 8.5 MG/DL (ref 8.3–10.1)
CHLORIDE SERPL-SCNC: 95 MMOL/L (ref 100–108)
CO2 SERPL-SCNC: 34 MMOL/L (ref 21–32)
CREAT SERPL-MCNC: 0.65 MG/DL (ref 0.6–1.3)
EOSINOPHIL # BLD AUTO: 0.17 THOUSAND/ΜL (ref 0–0.61)
EOSINOPHIL NFR BLD AUTO: 1 % (ref 0–6)
ERYTHROCYTE [DISTWIDTH] IN BLOOD BY AUTOMATED COUNT: 17.9 % (ref 11.6–15.1)
GFR SERPL CREATININE-BSD FRML MDRD: 95 ML/MIN/1.73SQ M
GLUCOSE SERPL-MCNC: 277 MG/DL (ref 65–140)
HCT VFR BLD AUTO: 35.3 % (ref 34.8–46.1)
HGB BLD-MCNC: 10.6 G/DL (ref 11.5–15.4)
IMM GRANULOCYTES # BLD AUTO: 0.07 THOUSAND/UL (ref 0–0.2)
IMM GRANULOCYTES NFR BLD AUTO: 0 % (ref 0–2)
LIPASE SERPL-CCNC: 53 U/L (ref 73–393)
LYMPHOCYTES # BLD AUTO: 1.09 THOUSANDS/ΜL (ref 0.6–4.47)
LYMPHOCYTES NFR BLD AUTO: 6 % (ref 14–44)
MCH RBC QN AUTO: 21.7 PG (ref 26.8–34.3)
MCHC RBC AUTO-ENTMCNC: 30 G/DL (ref 31.4–37.4)
MCV RBC AUTO: 72 FL (ref 82–98)
MONOCYTES # BLD AUTO: 0.81 THOUSAND/ΜL (ref 0.17–1.22)
MONOCYTES NFR BLD AUTO: 5 % (ref 4–12)
NEUTROPHILS # BLD AUTO: 14.79 THOUSANDS/ΜL (ref 1.85–7.62)
NEUTS SEG NFR BLD AUTO: 88 % (ref 43–75)
NRBC BLD AUTO-RTO: 0 /100 WBCS
PLATELET # BLD AUTO: 335 THOUSANDS/UL (ref 149–390)
PMV BLD AUTO: 11.5 FL (ref 8.9–12.7)
POTASSIUM SERPL-SCNC: 4.2 MMOL/L (ref 3.5–5.3)
PROT SERPL-MCNC: 6.9 G/DL (ref 6.4–8.2)
RBC # BLD AUTO: 4.89 MILLION/UL (ref 3.81–5.12)
SODIUM SERPL-SCNC: 136 MMOL/L (ref 136–145)
WBC # BLD AUTO: 16.98 THOUSAND/UL (ref 4.31–10.16)

## 2021-05-24 PROCEDURE — 36415 COLL VENOUS BLD VENIPUNCTURE: CPT | Performed by: EMERGENCY MEDICINE

## 2021-05-24 PROCEDURE — 74177 CT ABD & PELVIS W/CONTRAST: CPT

## 2021-05-24 PROCEDURE — 96374 THER/PROPH/DIAG INJ IV PUSH: CPT

## 2021-05-24 PROCEDURE — 80053 COMPREHEN METABOLIC PANEL: CPT | Performed by: EMERGENCY MEDICINE

## 2021-05-24 PROCEDURE — 99284 EMERGENCY DEPT VISIT MOD MDM: CPT

## 2021-05-24 PROCEDURE — 99284 EMERGENCY DEPT VISIT MOD MDM: CPT | Performed by: EMERGENCY MEDICINE

## 2021-05-24 PROCEDURE — 83690 ASSAY OF LIPASE: CPT | Performed by: EMERGENCY MEDICINE

## 2021-05-24 PROCEDURE — G1004 CDSM NDSC: HCPCS

## 2021-05-24 PROCEDURE — 85025 COMPLETE CBC W/AUTO DIFF WBC: CPT | Performed by: EMERGENCY MEDICINE

## 2021-05-24 RX ORDER — KETOROLAC TROMETHAMINE 30 MG/ML
15 INJECTION, SOLUTION INTRAMUSCULAR; INTRAVENOUS ONCE
Status: COMPLETED | OUTPATIENT
Start: 2021-05-24 | End: 2021-05-24

## 2021-05-24 RX ORDER — AMOXICILLIN AND CLAVULANATE POTASSIUM 875; 125 MG/1; MG/1
1 TABLET, FILM COATED ORAL EVERY 12 HOURS
Qty: 20 TABLET | Refills: 0 | Status: SHIPPED | OUTPATIENT
Start: 2021-05-24 | End: 2021-06-07 | Stop reason: HOSPADM

## 2021-05-24 RX ORDER — ONDANSETRON 4 MG/1
4 TABLET, ORALLY DISINTEGRATING ORAL ONCE
Status: COMPLETED | OUTPATIENT
Start: 2021-05-24 | End: 2021-05-24

## 2021-05-24 RX ADMIN — ONDANSETRON 4 MG: 4 TABLET, ORALLY DISINTEGRATING ORAL at 14:09

## 2021-05-24 RX ADMIN — KETOROLAC TROMETHAMINE 15 MG: 30 INJECTION, SOLUTION INTRAMUSCULAR; INTRAVENOUS at 14:10

## 2021-05-24 RX ADMIN — IOHEXOL 100 ML: 350 INJECTION, SOLUTION INTRAVENOUS at 14:50

## 2021-05-24 NOTE — TELEPHONE ENCOUNTER
Spoke with patient  She states she had made some vegetable soup last week but made sure veggies were soft but is wondering if that started the issue  C/O constant pain since Saturday  Location same area is when admitted in May 2021 for diverticulitis  No n/v  Lots of gas/pain  Did move bowels  Soft stool, no blood  If she lies down it helps the pain "a little"  Currently pain is 7/10  She sounded SOB to me and on questioning  states she is on 4 lpm oxygen all the time but thinks the SOB is from the pain  Suggested ER and patient agreed

## 2021-05-24 NOTE — ED PROVIDER NOTES
History  Chief Complaint   Patient presents with    Abdominal Pain     pt reports lower abdominal pain that started on saturday  denies n/v, states that her bowels are softer than usual       26-year-old female presents for evaluation of lower abdominal pain that started this past Saturday  She describes a constant worsening pain  She denies any associated nausea or vomiting  She states that she has had looser formed stools  She denies any black or bloody bowel movements  She states the pain is similar to when she had diverticulitis in the past   She has also had some sensation of incomplete emptying of her urine  She denies any associated chest pain, pressure, shortness of breath  She has been taking Tylenol p m  Without improvement  Prior to Admission Medications   Prescriptions Last Dose Informant Patient Reported? Taking?    Blood Glucose Monitoring Suppl (Answer.To CONTOUR NEXT MONITOR) w/Device KIT  Self Yes No   Sig: by Does not apply route daily   Cholecalciferol 1000 units tablet  Self Yes No   Sig: Take 1,000 Units by mouth daily     ZumobiCAN UNISTIK II LANCETS MISC  Self No No   Sig: by Does not apply route 3 (three) times a day   LORazepam (ATIVAN) 0 5 mg tablet   No No   Sig: TAKE 2 TABLETS BY MOUTH AT BEDTIME AND 1 EVERY 6 HOURS AS NEEDED FOR ANXIETY   Magnesium 400 MG CAPS  Self Yes No   Sig: Take by mouth 1 BID   albuterol (PROVENTIL HFA,VENTOLIN HFA) 90 mcg/act inhaler  Self No No   Sig: Inhale 2 puffs every 4 (four) hours as needed for wheezing   apixaban (Eliquis) 5 mg  Self No No   Sig: Take 1 tablet (5 mg total) by mouth 2 (two) times a day   ascorbic acid (VITAMIN C) 1000 MG tablet  Self Yes No   Sig: Take 1,000 mg by mouth daily     atorvastatin (LIPITOR) 40 mg tablet   No No   Sig: Take 1 tablet (40 mg total) by mouth daily   citalopram (CeleXA) 20 mg tablet  Self No No   Sig: Take 1 tablet (20 mg total) by mouth daily   cyanocobalamin (VITAMIN B-12) 100 mcg tablet  Self Yes No Sig: Take by mouth daily   ferrous sulfate (FeroSul) 220 (44 Fe) mg/5 mL solution   No No   Sig: Take 5 mL (220 mg total) by mouth 2 (two) times a day before meals   flecainide (TAMBOCOR) 100 mg tablet  Self No No   Sig: Take 1 tablet (100 mg total) by mouth every 12 (twelve) hours   fluticasone (FLONASE) 50 mcg/act nasal spray  Self No No   Si spray into each nostril 2 (two) times a day   fluticasone (FLOVENT HFA) 220 mcg/act inhaler   No No   Sig: Inhale 2 puffs 2 (two) times a day Rinse mouth after use  glimepiride (AMARYL) 4 mg tablet  Self No No   Sig: Take 1 tablet (4 mg total) by mouth daily with breakfast   glucose blood (CONTOUR NEXT TEST) test strip  Self No No   Sig: Test blood sugar 3 times a day   glycopyrrolate-formoterol (BEVESPI AEROSPHERE) 9-4 8 MCG/ACT inhaler  Self No No   Sig: Inhale 2 puffs 2 (two) times a day   levalbuterol (XOPENEX) 1 25 mg/0 5 mL nebulizer solution  Self No No   Sig: Take 0 5 mL (1 25 mg total) by nebulization 2 (two) times a day   loratadine (Claritin) 10 mg tablet  Self Yes No   Sig: Take by mouth   metFORMIN (GLUCOPHAGE-XR) 500 mg 24 hr tablet   No No   Sig: Take 2 tablets (1,000 mg total) by mouth 2 (two) times a day with meals   metoprolol succinate (TOPROL-XL) 50 mg 24 hr tablet  Self No No   Sig: Take 1 tablet (50 mg total) by mouth daily   montelukast (SINGULAIR) 10 mg tablet  Self No No   Sig: Take 1 tablet (10 mg total) by mouth daily at bedtime   naloxone (NARCAN) 4 mg/0 1 mL nasal spray  Self No No   Sig: Administer 1 spray into a nostril  If breathing does not return to normal or if breathing difficulty resumes after 2-3 minutes, give another dose in the other nostril using a new spray     omeprazole (PriLOSEC) 40 MG capsule  Self No No   Sig: Take 1 capsule (40 mg total) by mouth daily   potassium chloride (K-DUR,KLOR-CON) 20 mEq tablet  Self No No   Sig: Take 1 tablet (20 mEq total) by mouth 2 (two) times a day   torsemide (DEMADEX) 20 mg tablet  Self No No   Sig: Two tabs b i d    traZODone (DESYREL) 150 mg tablet   No No   Sig: TAKE 1 TABLET BY MOUTH AT BEDTIME      Facility-Administered Medications: None       Past Medical History:   Diagnosis Date    Alcohol abuse 2020    Alcohol abuse 2020    Anemia     Atrial fibrillation (HCC)     Cervical radiculopathy     [facility]congestive heart failure) ([facility])     Chronic low back pain     Chronic obstructive asthma ([facility] ) 2018    Community acquired pneumonia     Community acquired pneumonia 2020    Depression with anxiety 3/9/2014    Diabetes mellitus ([facility] )     Diabetes mellitus with hyperglycemia (HCC)     Elevated liver enzymes     GERD (gastroesophageal reflux disease)     Hypersomnolence 10/28/2020    Hypokalemia 2018    Paresthesia of upper extremity     Plantar fasciitis     Restless leg     Restless legs syndrome 2014    Sexual dysfunction     Sleep apnea, obstructive     Tenosynovitis of finger     Tinea corporis     Tobacco use 2018    Currently smoking 3-4 cigarettes daily    Trigger middle finger of left hand 2017    Trigger ring finger of left hand 2017    Weakness of upper extremity        Past Surgical History:   Procedure Laterality Date    ABDOMINAL SURGERY      CARPAL TUNNEL RELEASE Bilateral      SECTION      CHOLECYSTECTOMY      laparoscopic    ESOPHAGOGASTRODUODENOSCOPY N/A 12/3/2018    Procedure: ESOPHAGOGASTRODUODENOSCOPY (EGD) AND COLONOSCOPY;  Surgeon: Orlin Titus MD;  [facility];  Service: Gastroenterology    GASTRIC BYPASS      for morbid obesity with gilda en y    HERNIA REPAIR      HYSTERECTOMY      OH EXCIS PRIMARY GANGLION WRIST Left 2017    Procedure: LONG FINGER GANGLION CYST EXCISION;  Surgeon: Varsha Adams MD;  [facility];  Service: Orthopedics    OH INCISE FINGER TENDON SHEATH Left 2017    Procedure: THUMB, LONG, [address], TRIGGER FINGER RELEASE;  Surgeon: Thelma Huff Kirstie Leroy MD;  Location: [facility];  Service: Orthopedics    SHOULDER SURGERY         Family History   Problem Relation Age of Onset    Alzheimer's disease Mother     Atrial fibrillation Mother     Dementia Mother     Heart disease Mother         heart problem    Seizures Mother     Parkinsonism Father     Arthritis Father     Atrial fibrillation Father     Substance Abuse Neg Hx         mother,father    Mental illness Neg Hx         mother,father     I have reviewed and agree with the history as documented  E-Cigarette/Vaping    E-Cigarette Use Never User      E-Cigarette/Vaping Substances    Nicotine No     THC No     CBD No     Flavoring No     Other No     Unknown No      Social History     Tobacco Use    Smoking status: Former Smoker     Packs/day: 0 25     Years: 29 00     Pack years: 7 25     Types: Cigarettes     Start date: 200     Quit date: 12/10/2019     Years since quittin 4    Smokeless tobacco: Never Used   Substance Use Topics    Alcohol use: Not Currently     Alcohol/week: 20 0 standard drinks     Types: 20 Cans of beer per week     Frequency: 4 or more times a week     Drinks per session: 3 or 4     Binge frequency: Never     Comment: about 6 coors light every night, stopped 3 weeks ago     Drug use: No       Review of Systems   Constitutional: Negative for chills, fatigue and fever  HENT: Negative for sore throat  Eyes: Negative for visual disturbance  Respiratory: Negative for shortness of breath  Cardiovascular: Negative for chest pain  Gastrointestinal: Positive for abdominal pain  Negative for blood in stool, constipation, diarrhea, nausea and vomiting  Genitourinary: Negative for difficulty urinating, dysuria and pelvic pain  Musculoskeletal: Negative for back pain  Skin: Negative for rash  Neurological: Negative for syncope, weakness and headaches  All other systems reviewed and are negative        Physical Exam  Physical Exam  Vitals signs and nursing note reviewed  Constitutional:       General: She is not in acute distress  Appearance: She is well-developed  She is obese  HENT:      Head: Normocephalic and atraumatic  Right Ear: External ear normal       Left Ear: External ear normal    Eyes:      General: No scleral icterus  Conjunctiva/sclera: Conjunctivae normal       Pupils: Pupils are equal, round, and reactive to light  Neck:      Musculoskeletal: Normal range of motion  Cardiovascular:      Rate and Rhythm: Normal rate and regular rhythm  Heart sounds: Normal heart sounds  Pulmonary:      Effort: Pulmonary effort is normal  No respiratory distress  Breath sounds: Normal breath sounds  Abdominal:      General: Bowel sounds are normal       Palpations: Abdomen is soft  Tenderness: There is abdominal tenderness in the suprapubic area and left lower quadrant  There is no guarding or rebound  Musculoskeletal: Normal range of motion  Skin:     General: Skin is warm and dry  Findings: No rash  Neurological:      Mental Status: She is alert and oriented to person, place, and time           Vital Signs  ED Triage Vitals   Temperature Pulse Respirations Blood Pressure SpO2   05/24/21 1351 05/24/21 1351 05/24/21 1351 05/24/21 1352 05/24/21 1351   98 5 °F (36 9 °C) 89 20 140/65 95 %      Temp Source Heart Rate Source Patient Position - Orthostatic VS BP Location FiO2 (%)   05/24/21 1351 05/24/21 1351 05/24/21 1352 05/24/21 1352 --   Temporal Monitor Lying Right arm       Pain Score       --                  Vitals:    05/24/21 1351 05/24/21 1352   BP:  140/65   Pulse: 89    Patient Position - Orthostatic VS:  Lying         Visual Acuity      ED Medications  Medications   ondansetron (ZOFRAN-ODT) dispersible tablet 4 mg (4 mg Oral Given 5/24/21 1409)   ketorolac (TORADOL) injection 15 mg (15 mg Intravenous Given 5/24/21 1410)   iohexol (OMNIPAQUE) 350 MG/ML injection (MULTI-DOSE) 100 mL (100 mL Intravenous Given 5/24/21 1450)       Diagnostic Studies  Results Reviewed     Procedure Component Value Units Date/Time    Comprehensive metabolic panel [[id number]]  (Abnormal) Collected: 05/24/21 1405    Lab Status: Final result Specimen: Blood from Arm, Right Updated: 05/24/21 1442     Sodium 136 mmol/L      Potassium 4 2 mmol/L      Chloride 95 mmol/L      CO2 34 mmol/L      ANION GAP 7 mmol/L      BUN 5 mg/dL      Creatinine 0 65 mg/dL      Glucose 277 mg/dL      Calcium 8 5 mg/dL      Corrected Calcium 9 1 mg/dL       U/L       U/L      Alkaline Phosphatase 214 U/L      Total Protein 6 9 g/dL      Albumin 3 2 g/dL      Total Bilirubin 0 40 mg/dL      eGFR 95 ml/min/1 73sq m     Narrative:      National Kidney Disease Foundation guidelines for Chronic Kidney Disease (CKD):     Stage 1 with normal or high GFR (GFR > 90 mL/min/1 73 square meters)    Stage 2 Mild CKD (GFR = 60-89 mL/min/1 73 square meters)    Stage 3A Moderate CKD (GFR = 45-59 mL/min/1 73 square meters)    Stage 3B Moderate CKD (GFR = 30-44 mL/min/1 73 square meters)    Stage 4 Severe CKD (GFR = 15-29 mL/min/1 73 square meters)    Stage 5 End Stage CKD (GFR <15 mL/min/1 73 square meters)  Note: GFR calculation is accurate only with a steady state creatinine    Lipase [[id number]]  (Abnormal) Collected: 05/24/21 1405    Lab Status: Final result Specimen: Blood from Arm, Right Updated: 05/24/21 1442     Lipase 53 u/L     CBC and differential [[id number]]  (Abnormal) Collected: 05/24/21 1405    Lab Status: Final result Specimen: Blood from Arm, Right Updated: 05/24/21 1418     WBC 16 98 Thousand/uL      RBC 4 89 Million/uL      Hemoglobin 10 6 g/dL      Hematocrit 35 3 %      MCV 72 fL      MCH 21 7 pg      MCHC 30 0 g/dL      RDW 17 9 %      MPV 11 5 fL      Platelets 645 Thousands/uL      nRBC 0 /100 WBCs      Neutrophils Relative 88 %      Immat GRANS % 0 %      Lymphocytes Relative 6 %      Monocytes Relative 5 %      Eosinophils Relative 1 %      Basophils Relative 0 %      Neutrophils Absolute 14 79 Thousands/µL      Immature Grans Absolute 0 07 Thousand/uL      Lymphocytes Absolute 1 09 Thousands/µL      Monocytes Absolute 0 81 Thousand/µL      Eosinophils Absolute 0 17 Thousand/µL      Basophils Absolute 0 05 Thousands/µL     UA (URINE) with reflex to Scope [[id number]]     Lab Status: No result Specimen: Urine                  CT abdomen pelvis with contrast   Final Result by Mario Garrett MD (05/24 1526)      Similar to prior study, there is sigmoid diverticulosis with wall thickening and pericolonic inflammation as seen on image 2/62 consistent with acute sigmoid diverticulitis  The location is slightly more proximal compared to previous exam   No    perforation or abscess  Status post gastric bypass surgery  Stable polyps along the greater curvature of the excluded stomach the largest measuring 1 cm on image 2/21  Enlarged fatty liver unchanged  Workstation performed: [id number]                    Procedures  Procedures         ED Course                                           MDM  Number of Diagnoses or Management Options  Acute diverticulitis:   Sigmoid diverticulitis:   Transaminitis:   Diagnosis management comments: Differential diagnosis:  Diverticulitis, urinary tract infection, colitis, enteritis, bowel obstruction, ureteral calculus, other  Plan is for laboratories and CT abdomen pelvis    The laboratories and CT reviewed with the patient  Discussed plan for Carlos ray with the understanding that patient may have a worsening clinical course and the need to return to the emergency department for subsequent IV antibiotics and admission  The patient was also informed for mildly elevated LFTs in the need to follow-up with her primary care provider for outpatient retesting of this blood work      The patient (and any family present) verbalized understanding of the discharge instructions and warnings that would necessitate return to the Emergency Department  All questions were answered prior to discharge  Amount and/or Complexity of Data Reviewed  Clinical lab tests: reviewed  Tests in the radiology section of CPT®: reviewed  Review and summarize past medical records: yes        Disposition  Final diagnoses:   Sigmoid diverticulitis - acute   Transaminitis   Acute diverticulitis     Time reflects when diagnosis was documented in both MDM as applicable and the Disposition within this note     Time User Action Codes Description Comment    5/24/2021  3:43 PM Knott Coffin B Add [K57 32] Sigmoid diverticulitis     5/24/2021  3:43 PM Gayle Gandara Modify [K57 32] Sigmoid diverticulitis acute    5/24/2021  3:43 PM Knott Coffin B Add [R74 01] Transaminitis     5/24/2021  3:44 PM Gayle Gandara Add [K57 92] Acute diverticulitis       ED Disposition     ED Disposition Condition Date/Time Comment    Discharge Stable Mon May 24, 2021  3:43 PM Ankru 76 discharge to home/self care  Follow-up Information     Follow up With Specialties Details Why Contact Info    Marbin Menezes MD Family Medicine Schedule an appointment as soon as possible for a visit in 2 weeks For further evaluation, to follow up on abnormal test (liver function) [place] [id number] [phone]            Patient's Medications   Discharge Prescriptions    AMOXICILLIN-CLAVULANATE (AUGMENTIN) 875-125 MG PER TABLET    Take 1 tablet by mouth every 12 (twelve) hours for 10 days       Start Date: 5/24/2021 End Date: 6/3/2021       Order Dose: 1 tablet       Quantity: 20 tablet    Refills: 0     No discharge procedures on file      PDMP Review       Value Time User    PDMP Reviewed  Yes 5/7/2021 10:55 AM Marbin Menezes MD          ED Provider  Electronically Signed by           Olga Lidia Hernandez DO  05/24/21 1590

## 2021-05-24 NOTE — TELEPHONE ENCOUNTER
Pt states she saw Lizzie Kicks in the hosp 4 wks ago and needs an apt  Also, has pain between her ovaries/rates #10  I noted if pain is that severe she should go to the ER  She thinks it might be something she ate/wants to talk to someone first  CB# home [phone]

## 2021-05-25 ENCOUNTER — TELEPHONE (OUTPATIENT)
Dept: FAMILY MEDICINE CLINIC | Facility: HOSPITAL | Age: 61
End: 2021-05-25

## 2021-05-25 NOTE — TELEPHONE ENCOUNTER
Lm on our vm @ 358pm - [facility] home care nurse needs to speak to our staff about patient - although patient was recently seen in er for diverticulitis, she seems to be doing better with that - she is calling today about the patients turosemide dosage and multiple symptoms - PCB

## 2021-05-26 NOTE — TELEPHONE ENCOUNTER
Spoke to [facility]  Made aware of med changes  She will discuss this with the patient when she sees her this afternoon

## 2021-05-26 NOTE — TELEPHONE ENCOUNTER
Please call  On review she is on toresmide 40 in the am and 40 in the PM    Increase this to 60 mg in AM and 40 mg in the pm      Continue with dailyl weights  To call if any significant weight changes

## 2021-05-26 NOTE — TELEPHONE ENCOUNTER
Hope just left after seeing Ivy  Is taking Acetaminophen 500mg 2 tablets once or twice daily  Is also then taking Acetaminophen-diphenhydramine 500mg PM will take 2 at night sometimes to help with pain and help her sleep  Wants to know if this is ok, as she is already on other meds for sleep as well    Please advise

## 2021-05-26 NOTE — TELEPHONE ENCOUNTER
Pt doing better  Was treated with AB  She did confirm appt for 7/7/2021  [id number]

## 2021-05-26 NOTE — TELEPHONE ENCOUNTER
Blanca Forrester from [facility] said she saw the patient yesterday and noticed a 5lb weight gaint since last Friday and feels more SOB  Patient took an extra 20mg torsemide with her normal dose due to the weight gain/SOB  She had a recent ED visit 2 days ago  She hasnt seen the cardiologist since 12/2020  Looks like you last prescribed torsemide last    Nurse is asking if you would like to order a PRN dose of the torsemide  Please advise       Blanca Forrester would like us to call her primary home care nurse with [facility] [address] [phone]

## 2021-06-01 ENCOUNTER — APPOINTMENT (EMERGENCY)
Dept: CT IMAGING | Facility: HOSPITAL | Age: 61
DRG: 720 | End: 2021-06-01
Payer: COMMERCIAL

## 2021-06-01 ENCOUNTER — HOSPITAL ENCOUNTER (INPATIENT)
Facility: HOSPITAL | Age: 61
LOS: 6 days | Discharge: HOME HEALTH | DRG: 720 | End: 2021-06-07
Attending: EMERGENCY MEDICINE | Admitting: INTERNAL MEDICINE
Payer: COMMERCIAL

## 2021-06-01 ENCOUNTER — TELEPHONE (OUTPATIENT)
Dept: GASTROENTEROLOGY | Facility: CLINIC | Age: 61
End: 2021-06-01

## 2021-06-01 ENCOUNTER — APPOINTMENT (INPATIENT)
Dept: RADIOLOGY | Facility: HOSPITAL | Age: 61
DRG: 720 | End: 2021-06-01
Payer: COMMERCIAL

## 2021-06-01 DIAGNOSIS — K57.92 ACUTE DIVERTICULITIS: ICD-10-CM

## 2021-06-01 DIAGNOSIS — K62.5 BRBPR (BRIGHT RED BLOOD PER RECTUM): Primary | ICD-10-CM

## 2021-06-01 DIAGNOSIS — D64.9 ANEMIA: ICD-10-CM

## 2021-06-01 DIAGNOSIS — K76.0 HEPATIC STEATOSIS: ICD-10-CM

## 2021-06-01 DIAGNOSIS — J96.11 CHRONIC RESPIRATORY FAILURE WITH HYPOXIA (HCC): ICD-10-CM

## 2021-06-01 DIAGNOSIS — I48.91 ATRIAL FIBRILLATION, UNSPECIFIED TYPE (HCC): ICD-10-CM

## 2021-06-01 DIAGNOSIS — N18.2 TYPE 2 DIABETES MELLITUS WITH STAGE 2 CHRONIC KIDNEY DISEASE, WITHOUT LONG-TERM CURRENT USE OF INSULIN (HCC): ICD-10-CM

## 2021-06-01 DIAGNOSIS — K57.92 DIVERTICULITIS: ICD-10-CM

## 2021-06-01 DIAGNOSIS — E11.22 TYPE 2 DIABETES MELLITUS WITH STAGE 2 CHRONIC KIDNEY DISEASE, WITHOUT LONG-TERM CURRENT USE OF INSULIN (HCC): ICD-10-CM

## 2021-06-01 DIAGNOSIS — E87.2 LACTIC ACIDOSIS: ICD-10-CM

## 2021-06-01 DIAGNOSIS — I48.91 ATRIAL FIBRILLATION WITH RAPID VENTRICULAR RESPONSE (HCC): ICD-10-CM

## 2021-06-01 DIAGNOSIS — A41.9 SEVERE SEPSIS (HCC): ICD-10-CM

## 2021-06-01 DIAGNOSIS — R65.20 SEVERE SEPSIS (HCC): ICD-10-CM

## 2021-06-01 PROBLEM — K92.2 LOWER GASTROINTESTINAL BLEEDING: Status: ACTIVE | Noted: 2021-06-01

## 2021-06-01 PROBLEM — D62 ACUTE BLOOD LOSS ANEMIA: Status: ACTIVE | Noted: 2021-06-01

## 2021-06-01 PROBLEM — R74.01 TRANSAMINITIS: Status: ACTIVE | Noted: 2021-06-01

## 2021-06-01 LAB
ABO GROUP BLD: NORMAL
ABO GROUP BLD: NORMAL
ALBUMIN SERPL BCP-MCNC: 3 G/DL (ref 3.5–5)
ALP SERPL-CCNC: 176 U/L (ref 46–116)
ALT SERPL W P-5'-P-CCNC: 91 U/L (ref 12–78)
ANION GAP SERPL CALCULATED.3IONS-SCNC: 10 MMOL/L (ref 4–13)
APTT PPP: 28 SECONDS (ref 23–37)
AST SERPL W P-5'-P-CCNC: 51 U/L (ref 5–45)
BASOPHILS # BLD AUTO: 0.07 THOUSANDS/ΜL (ref 0–0.1)
BASOPHILS NFR BLD AUTO: 0 % (ref 0–1)
BILIRUB SERPL-MCNC: 0.3 MG/DL (ref 0.2–1)
BILIRUB UR QL STRIP: NEGATIVE
BLD GP AB SCN SERPL QL: NEGATIVE
BUN SERPL-MCNC: 10 MG/DL (ref 5–25)
CALCIUM ALBUM COR SERPL-MCNC: 9.4 MG/DL (ref 8.3–10.1)
CALCIUM SERPL-MCNC: 8.6 MG/DL (ref 8.3–10.1)
CHLORIDE SERPL-SCNC: 93 MMOL/L (ref 100–108)
CLARITY UR: CLEAR
CO2 SERPL-SCNC: 33 MMOL/L (ref 21–32)
COLOR UR: YELLOW
CREAT SERPL-MCNC: 0.76 MG/DL (ref 0.6–1.3)
EOSINOPHIL # BLD AUTO: 0.36 THOUSAND/ΜL (ref 0–0.61)
EOSINOPHIL NFR BLD AUTO: 2 % (ref 0–6)
ERYTHROCYTE [DISTWIDTH] IN BLOOD BY AUTOMATED COUNT: 18.4 % (ref 11.6–15.1)
GFR SERPL CREATININE-BSD FRML MDRD: 84 ML/MIN/1.73SQ M
GLUCOSE SERPL-MCNC: 297 MG/DL (ref 65–140)
GLUCOSE SERPL-MCNC: 299 MG/DL (ref 65–140)
GLUCOSE SERPL-MCNC: 316 MG/DL (ref 65–140)
GLUCOSE UR STRIP-MCNC: ABNORMAL MG/DL
HCT VFR BLD AUTO: 24.4 % (ref 34.8–46.1)
HCT VFR BLD AUTO: 28.7 % (ref 34.8–46.1)
HGB BLD-MCNC: 7.2 G/DL (ref 11.5–15.4)
HGB BLD-MCNC: 8.2 G/DL (ref 11.5–15.4)
HGB BLD-MCNC: 8.5 G/DL (ref 11.5–15.4)
HGB UR QL STRIP.AUTO: NEGATIVE
IMM GRANULOCYTES # BLD AUTO: 0.12 THOUSAND/UL (ref 0–0.2)
IMM GRANULOCYTES NFR BLD AUTO: 1 % (ref 0–2)
INR PPP: 1.15 (ref 0.84–1.19)
KETONES UR STRIP-MCNC: NEGATIVE MG/DL
LACTATE SERPL-SCNC: 3.2 MMOL/L (ref 0.5–2)
LACTATE SERPL-SCNC: 3.7 MMOL/L (ref 0.5–2)
LACTATE SERPL-SCNC: 4.7 MMOL/L (ref 0.5–2)
LACTATE SERPL-SCNC: 4.7 MMOL/L (ref 0.5–2)
LACTATE SERPL-SCNC: 6.3 MMOL/L (ref 0.5–2)
LEUKOCYTE ESTERASE UR QL STRIP: NEGATIVE
LIPASE SERPL-CCNC: 72 U/L (ref 73–393)
LYMPHOCYTES # BLD AUTO: 2.73 THOUSANDS/ΜL (ref 0.6–4.47)
LYMPHOCYTES NFR BLD AUTO: 17 % (ref 14–44)
MCH RBC QN AUTO: 21.7 PG (ref 26.8–34.3)
MCHC RBC AUTO-ENTMCNC: 29.6 G/DL (ref 31.4–37.4)
MCV RBC AUTO: 73 FL (ref 82–98)
MONOCYTES # BLD AUTO: 1.15 THOUSAND/ΜL (ref 0.17–1.22)
MONOCYTES NFR BLD AUTO: 7 % (ref 4–12)
NEUTROPHILS # BLD AUTO: 11.42 THOUSANDS/ΜL (ref 1.85–7.62)
NEUTS SEG NFR BLD AUTO: 73 % (ref 43–75)
NITRITE UR QL STRIP: NEGATIVE
NRBC BLD AUTO-RTO: 0 /100 WBCS
PH UR STRIP.AUTO: 6.5 [PH]
PLATELET # BLD AUTO: 288 THOUSANDS/UL (ref 149–390)
PMV BLD AUTO: 11.9 FL (ref 8.9–12.7)
POTASSIUM SERPL-SCNC: 4.2 MMOL/L (ref 3.5–5.3)
PROCALCITONIN SERPL-MCNC: <0.05 NG/ML
PROT SERPL-MCNC: 6.3 G/DL (ref 6.4–8.2)
PROT UR STRIP-MCNC: NEGATIVE MG/DL
PROTHROMBIN TIME: 14.7 SECONDS (ref 11.6–14.5)
RBC # BLD AUTO: 3.92 MILLION/UL (ref 3.81–5.12)
RH BLD: POSITIVE
RH BLD: POSITIVE
SODIUM SERPL-SCNC: 136 MMOL/L (ref 136–145)
SP GR UR STRIP.AUTO: <=1.005 (ref 1–1.03)
SPECIMEN EXPIRATION DATE: NORMAL
UROBILINOGEN UR QL STRIP.AUTO: 0.2 E.U./DL
WBC # BLD AUTO: 15.85 THOUSAND/UL (ref 4.31–10.16)

## 2021-06-01 PROCEDURE — C9113 INJ PANTOPRAZOLE SODIUM, VIA: HCPCS | Performed by: PHYSICIAN ASSISTANT

## 2021-06-01 PROCEDURE — 96365 THER/PROPH/DIAG IV INF INIT: CPT

## 2021-06-01 PROCEDURE — 86901 BLOOD TYPING SEROLOGIC RH(D): CPT | Performed by: EMERGENCY MEDICINE

## 2021-06-01 PROCEDURE — G1004 CDSM NDSC: HCPCS

## 2021-06-01 PROCEDURE — 83690 ASSAY OF LIPASE: CPT | Performed by: EMERGENCY MEDICINE

## 2021-06-01 PROCEDURE — 81003 URINALYSIS AUTO W/O SCOPE: CPT | Performed by: PHYSICIAN ASSISTANT

## 2021-06-01 PROCEDURE — 96366 THER/PROPH/DIAG IV INF ADDON: CPT

## 2021-06-01 PROCEDURE — 96375 TX/PRO/DX INJ NEW DRUG ADDON: CPT

## 2021-06-01 PROCEDURE — 86920 COMPATIBILITY TEST SPIN: CPT

## 2021-06-01 PROCEDURE — 87040 BLOOD CULTURE FOR BACTERIA: CPT | Performed by: EMERGENCY MEDICINE

## 2021-06-01 PROCEDURE — P9016 RBC LEUKOCYTES REDUCED: HCPCS

## 2021-06-01 PROCEDURE — 85014 HEMATOCRIT: CPT | Performed by: EMERGENCY MEDICINE

## 2021-06-01 PROCEDURE — 83605 ASSAY OF LACTIC ACID: CPT | Performed by: EMERGENCY MEDICINE

## 2021-06-01 PROCEDURE — 74177 CT ABD & PELVIS W/CONTRAST: CPT

## 2021-06-01 PROCEDURE — 83605 ASSAY OF LACTIC ACID: CPT | Performed by: INTERNAL MEDICINE

## 2021-06-01 PROCEDURE — 99223 1ST HOSP IP/OBS HIGH 75: CPT | Performed by: INTERNAL MEDICINE

## 2021-06-01 PROCEDURE — 99285 EMERGENCY DEPT VISIT HI MDM: CPT

## 2021-06-01 PROCEDURE — 3066F NEPHROPATHY DOC TX: CPT | Performed by: INTERNAL MEDICINE

## 2021-06-01 PROCEDURE — 85025 COMPLETE CBC W/AUTO DIFF WBC: CPT | Performed by: EMERGENCY MEDICINE

## 2021-06-01 PROCEDURE — 85018 HEMOGLOBIN: CPT | Performed by: EMERGENCY MEDICINE

## 2021-06-01 PROCEDURE — 71045 X-RAY EXAM CHEST 1 VIEW: CPT

## 2021-06-01 PROCEDURE — 85610 PROTHROMBIN TIME: CPT | Performed by: EMERGENCY MEDICINE

## 2021-06-01 PROCEDURE — 82272 OCCULT BLD FECES 1-3 TESTS: CPT

## 2021-06-01 PROCEDURE — 99291 CRITICAL CARE FIRST HOUR: CPT | Performed by: EMERGENCY MEDICINE

## 2021-06-01 PROCEDURE — 94760 N-INVAS EAR/PLS OXIMETRY 1: CPT

## 2021-06-01 PROCEDURE — 99254 IP/OBS CNSLTJ NEW/EST MOD 60: CPT | Performed by: INTERNAL MEDICINE

## 2021-06-01 PROCEDURE — 85730 THROMBOPLASTIN TIME PARTIAL: CPT | Performed by: EMERGENCY MEDICINE

## 2021-06-01 PROCEDURE — 96361 HYDRATE IV INFUSION ADD-ON: CPT

## 2021-06-01 PROCEDURE — 86900 BLOOD TYPING SEROLOGIC ABO: CPT | Performed by: EMERGENCY MEDICINE

## 2021-06-01 PROCEDURE — 84145 PROCALCITONIN (PCT): CPT | Performed by: EMERGENCY MEDICINE

## 2021-06-01 PROCEDURE — 86850 RBC ANTIBODY SCREEN: CPT | Performed by: EMERGENCY MEDICINE

## 2021-06-01 PROCEDURE — 93005 ELECTROCARDIOGRAM TRACING: CPT

## 2021-06-01 PROCEDURE — 36415 COLL VENOUS BLD VENIPUNCTURE: CPT | Performed by: EMERGENCY MEDICINE

## 2021-06-01 PROCEDURE — 82948 REAGENT STRIP/BLOOD GLUCOSE: CPT

## 2021-06-01 PROCEDURE — 80053 COMPREHEN METABOLIC PANEL: CPT | Performed by: EMERGENCY MEDICINE

## 2021-06-01 PROCEDURE — 85018 HEMOGLOBIN: CPT | Performed by: PHYSICIAN ASSISTANT

## 2021-06-01 PROCEDURE — 94660 CPAP INITIATION&MGMT: CPT

## 2021-06-01 RX ORDER — ACETAMINOPHEN 325 MG/1
650 TABLET ORAL EVERY 6 HOURS PRN
Status: DISCONTINUED | OUTPATIENT
Start: 2021-06-01 | End: 2021-06-07 | Stop reason: HOSPADM

## 2021-06-01 RX ORDER — FLUTICASONE PROPIONATE 220 UG/1
2 AEROSOL, METERED RESPIRATORY (INHALATION) 2 TIMES DAILY
Status: DISCONTINUED | OUTPATIENT
Start: 2021-06-01 | End: 2021-06-07 | Stop reason: HOSPADM

## 2021-06-01 RX ORDER — LEVALBUTEROL 1.25 MG/.5ML
1.25 SOLUTION, CONCENTRATE RESPIRATORY (INHALATION)
Status: DISCONTINUED | OUTPATIENT
Start: 2021-06-01 | End: 2021-06-03

## 2021-06-01 RX ORDER — LORAZEPAM 1 MG/1
1 TABLET ORAL
Status: DISCONTINUED | OUTPATIENT
Start: 2021-06-01 | End: 2021-06-07 | Stop reason: HOSPADM

## 2021-06-01 RX ORDER — CITALOPRAM 20 MG/1
20 TABLET ORAL DAILY
Status: DISCONTINUED | OUTPATIENT
Start: 2021-06-02 | End: 2021-06-07 | Stop reason: HOSPADM

## 2021-06-01 RX ORDER — ONDANSETRON 2 MG/ML
4 INJECTION INTRAMUSCULAR; INTRAVENOUS ONCE
Status: COMPLETED | OUTPATIENT
Start: 2021-06-01 | End: 2021-06-01

## 2021-06-01 RX ORDER — ALBUTEROL SULFATE 90 UG/1
2 AEROSOL, METERED RESPIRATORY (INHALATION) EVERY 4 HOURS PRN
Status: DISCONTINUED | OUTPATIENT
Start: 2021-06-01 | End: 2021-06-07 | Stop reason: HOSPADM

## 2021-06-01 RX ORDER — LORATADINE 10 MG/1
10 TABLET ORAL DAILY
Status: DISCONTINUED | OUTPATIENT
Start: 2021-06-02 | End: 2021-06-07 | Stop reason: HOSPADM

## 2021-06-01 RX ORDER — ATORVASTATIN CALCIUM 40 MG/1
40 TABLET, FILM COATED ORAL
Status: DISCONTINUED | OUTPATIENT
Start: 2021-06-01 | End: 2021-06-07 | Stop reason: HOSPADM

## 2021-06-01 RX ORDER — TRAZODONE HYDROCHLORIDE 150 MG/1
150 TABLET ORAL
Status: DISCONTINUED | OUTPATIENT
Start: 2021-06-01 | End: 2021-06-07 | Stop reason: HOSPADM

## 2021-06-01 RX ORDER — SODIUM CHLORIDE, SODIUM GLUCONATE, SODIUM ACETATE, POTASSIUM CHLORIDE, MAGNESIUM CHLORIDE, SODIUM PHOSPHATE, DIBASIC, AND POTASSIUM PHOSPHATE .53; .5; .37; .037; .03; .012; .00082 G/100ML; G/100ML; G/100ML; G/100ML; G/100ML; G/100ML; G/100ML
1000 INJECTION, SOLUTION INTRAVENOUS ONCE
Status: COMPLETED | OUTPATIENT
Start: 2021-06-01 | End: 2021-06-01

## 2021-06-01 RX ORDER — FLUTICASONE PROPIONATE 50 MCG
1 SPRAY, SUSPENSION (ML) NASAL 2 TIMES DAILY
Status: DISCONTINUED | OUTPATIENT
Start: 2021-06-01 | End: 2021-06-07 | Stop reason: HOSPADM

## 2021-06-01 RX ORDER — MONTELUKAST SODIUM 10 MG/1
10 TABLET ORAL
Status: DISCONTINUED | OUTPATIENT
Start: 2021-06-01 | End: 2021-06-07 | Stop reason: HOSPADM

## 2021-06-01 RX ORDER — FLECAINIDE ACETATE 100 MG/1
100 TABLET ORAL EVERY 12 HOURS SCHEDULED
Status: DISCONTINUED | OUTPATIENT
Start: 2021-06-01 | End: 2021-06-07 | Stop reason: HOSPADM

## 2021-06-01 RX ORDER — SODIUM CHLORIDE, SODIUM GLUCONATE, SODIUM ACETATE, POTASSIUM CHLORIDE, MAGNESIUM CHLORIDE, SODIUM PHOSPHATE, DIBASIC, AND POTASSIUM PHOSPHATE .53; .5; .37; .037; .03; .012; .00082 G/100ML; G/100ML; G/100ML; G/100ML; G/100ML; G/100ML; G/100ML
1000 INJECTION, SOLUTION INTRAVENOUS ONCE
Status: COMPLETED | OUTPATIENT
Start: 2021-06-01 | End: 2021-06-02

## 2021-06-01 RX ORDER — PANTOPRAZOLE SODIUM 40 MG/1
40 INJECTION, POWDER, FOR SOLUTION INTRAVENOUS EVERY 12 HOURS SCHEDULED
Status: DISCONTINUED | OUTPATIENT
Start: 2021-06-01 | End: 2021-06-04

## 2021-06-01 RX ADMIN — ATORVASTATIN CALCIUM 40 MG: 40 TABLET, FILM COATED ORAL at 17:45

## 2021-06-01 RX ADMIN — FLUTICASONE PROPIONATE 2 PUFF: 220 AEROSOL, METERED RESPIRATORY (INHALATION) at 19:29

## 2021-06-01 RX ADMIN — MONTELUKAST SODIUM 10 MG: 10 TABLET, FILM COATED ORAL at 21:34

## 2021-06-01 RX ADMIN — PIPERACILLIN SODIUM AND TAZOBACTAM SODIUM 4.5 G: 4; .5 INJECTION, POWDER, LYOPHILIZED, FOR SOLUTION INTRAVENOUS at 11:05

## 2021-06-01 RX ADMIN — PANTOPRAZOLE SODIUM 40 MG: 40 INJECTION, POWDER, FOR SOLUTION INTRAVENOUS at 14:10

## 2021-06-01 RX ADMIN — Medication 3.38 G: at 18:57

## 2021-06-01 RX ADMIN — SODIUM CHLORIDE, SODIUM GLUCONATE, SODIUM ACETATE, POTASSIUM CHLORIDE, MAGNESIUM CHLORIDE, SODIUM PHOSPHATE, DIBASIC, AND POTASSIUM PHOSPHATE 1000 ML: .53; .5; .37; .037; .03; .012; .00082 INJECTION, SOLUTION INTRAVENOUS at 23:05

## 2021-06-01 RX ADMIN — ONDANSETRON 4 MG: 2 INJECTION INTRAMUSCULAR; INTRAVENOUS at 14:10

## 2021-06-01 RX ADMIN — ACETAMINOPHEN 650 MG: 325 TABLET, FILM COATED ORAL at 21:33

## 2021-06-01 RX ADMIN — INSULIN LISPRO 4 UNITS: 100 INJECTION, SOLUTION INTRAVENOUS; SUBCUTANEOUS at 23:08

## 2021-06-01 RX ADMIN — SODIUM CHLORIDE 500 ML: 0.9 INJECTION, SOLUTION INTRAVENOUS at 10:03

## 2021-06-01 RX ADMIN — TRAZODONE HYDROCHLORIDE 150 MG: 150 TABLET ORAL at 23:03

## 2021-06-01 RX ADMIN — INSULIN LISPRO 4 UNITS: 100 INJECTION, SOLUTION INTRAVENOUS; SUBCUTANEOUS at 17:47

## 2021-06-01 RX ADMIN — FLECAINIDE ACETATE 100 MG: 100 TABLET ORAL at 21:33

## 2021-06-01 RX ADMIN — LORAZEPAM 1 MG: 1 TABLET ORAL at 21:34

## 2021-06-01 RX ADMIN — SODIUM CHLORIDE, SODIUM GLUCONATE, SODIUM ACETATE, POTASSIUM CHLORIDE, MAGNESIUM CHLORIDE, SODIUM PHOSPHATE, DIBASIC, AND POTASSIUM PHOSPHATE 1000 ML: .53; .5; .37; .037; .03; .012; .00082 INJECTION, SOLUTION INTRAVENOUS at 11:33

## 2021-06-01 RX ADMIN — SODIUM CHLORIDE, SODIUM GLUCONATE, SODIUM ACETATE, POTASSIUM CHLORIDE, MAGNESIUM CHLORIDE, SODIUM PHOSPHATE, DIBASIC, AND POTASSIUM PHOSPHATE 1000 ML: .53; .5; .37; .037; .03; .012; .00082 INJECTION, SOLUTION INTRAVENOUS at 20:03

## 2021-06-01 RX ADMIN — IOHEXOL 100 ML: 350 INJECTION, SOLUTION INTRAVENOUS at 11:26

## 2021-06-01 RX ADMIN — SODIUM CHLORIDE, SODIUM GLUCONATE, SODIUM ACETATE, POTASSIUM CHLORIDE, MAGNESIUM CHLORIDE, SODIUM PHOSPHATE, DIBASIC, AND POTASSIUM PHOSPHATE 1000 ML: .53; .5; .37; .037; .03; .012; .00082 INJECTION, SOLUTION INTRAVENOUS at 12:22

## 2021-06-01 NOTE — SEPSIS NOTE
Sepsis Note   Han Ballard 56 y o  female MRN: [id number]  Unit/Bed#: ED 10 Encounter: [id number]      qSOFA     Row Name 06/01/21 1145 06/01/21 1115 06/01/21 0945          Altered mental status GCS < 15  --  --  --      Respiratory Rate > / =22  1  0  1      Systolic BP < / =451  0  0  0      Q Sofa Score  1  0  1          Initial Sepsis Screening     Row Name 06/01/21 1206                Is the patient's history suggestive of a new or worsening infection? (!) Yes (Proceed)  -LG        Suspected source of infection  acute abdominal infection  -LG        Are two or more of the following signs & symptoms of infection both present and new to the patient? (!) Yes (Proceed)  -LG        Indicate SIRS criteria  Leukocytosis (WBC > 49527 IJL); Tachycardia > 90 bpm  -LG        If the answer is yes to both questions, suspicion of sepsis is present  --        If severe sepsis is present AND tissue hypoperfusion perists in the hour after fluid resuscitation or lactate > 4, the patient meets criteria for SEPTIC SHOCK  --        Are any of the following organ dysfunction criteria present within 6 hours of suspected infection and SIRS criteria that are NOT considered to be chronic conditions?   (!) Yes  -LG        Organ dysfunction  Lactate > 2 0 mmol/L;Lactate >/equal 4 0 mmol/L  -LG        Date of presentation of severe sepsis  --        Time of presentation of severe sepsis  1130  -LG        Tissue hypoperfusion persists in the hour after crystalloid fluid administration, evidenced, by either:  --        Was hypotension present within one hour of the conclusion of crystalloid fluid administration?  --        Date of presentation of septic shock  --        Time of presentation of septic shock  --          User Key  (r) = Recorded By, (t) = Taken By, (c) = Cosigned By    [address] Name Provider Type    LG Nash Irvin DO Physician

## 2021-06-01 NOTE — TELEPHONE ENCOUNTER
Pt called  Started with rectal bleeding last night around 11:00 pm and has diarrhea x 8 since then  Not feeling well at all  Advised ER

## 2021-06-01 NOTE — ASSESSMENT & PLAN NOTE
· Presented to the emergency department with bright red blood per rectum, abdominal pain  Currently being treated for diverticulitis as an outpatient on oral Augmentin, last dose today  · Patient met severe sepsis criteria with tachycardia, leukocytosis and lactic acidosis possibly in setting of ? Ischemic colitis, also concern for possible sinus tract between the ovary and sigmoid colon however no obvious abscess  · Received 30 cc/kg in the ED of IVF with improvement in lactic acidosis, repeat pending in 2 hours  · Vital signs stable at time of admission  · ED attending discussed with General surgery - no surgical interventions warranted at this time  Recommended continuing IV abx  Also discussed with gastroenterology    · Will check urinalysis, CXR  · Follow-up blood cultures x2  · Started on IV Zosyn in the ED  · Trend WBC and fever curve

## 2021-06-01 NOTE — ASSESSMENT & PLAN NOTE
Wt Readings from Last 3 Encounters:   06/01/21 129 kg (285 lb)   05/13/21 127 kg (281 lb)   05/02/21 129 kg (285 lb 6 4 oz)     · Appears euvolemic on exam  · Maintained on torsemide as an outpatient - hold in setting of gastrointestinal bleed  · Monitor volume status closely as patient received IVF per sepsis protocol  · Daily weights, I/O, low-sodium diet  · Echocardiogram November 2020 EF 87% grade 2 diastolic dysfunction

## 2021-06-01 NOTE — ASSESSMENT & PLAN NOTE
· Does have chronic anemia, hemoglobin baseline appears to be around 9-10  · Hemoglobin 8 5 on admission likely in setting of lower gastrointestinal bleed  · Trend H/H q 6 hours and transfuse as needed - patient required 2 units PRBCs total since admission  · Hold Eliquis  · Hemoglobin 8 0 this morning  · No further episodes of bleeding per nursing

## 2021-06-01 NOTE — ASSESSMENT & PLAN NOTE
· Does have chronic anemia, hemoglobin baseline appears to be around 9-10  · Hemoglobin 8 5 on admission likely in setting of lower gastrointestinal bleed  · Trend H/H q 6 hours and transfuse as needed - check H/H now  · Hold Eliquis

## 2021-06-01 NOTE — CONSULTS
Consultation - [id number] [facility]astroenterology     Germaine Pate 56 y o  female MRN: [id number]  Unit/Bed#: ED 10 Encounter: [id number]    Consults    ASSESSMENT and PLAN    1  Patient is a 39-year-old female with a history of diverticulosis and diverticulitis  She was treated for diverticulitis approximately 8 days ago  Last evening developed rectal bleeding  She is on Eliquis  CT scan shows thickening and inflammation of the sigmoid colon  The differential diagnosis includes ischemic colitis, it is also the possible this could be 'segmental colitis associated with diverticular disease' (SCAD) it is also possible this could be a diverticular bleed with no active colitis although the CT scan does seem to suggest some active inflammation  At this point I recommend clear liquid diet, IV antibiotics, hold the Eliquis, transfuse as needed and follow her with serial exams  If the bleeding stops and she makes a full recovery off anticoagulation we could plan for outpatient colonoscopy in 4-6 weeks  If the bleeding continues in the needs uncertain and there is uncertainty about the diagnosis, we could consider repeating the CT scan and/or emergent colonoscopy  At this point I do not think either is necessary  Thanks will follow with you  Chief Complaint   Patient presents with    Rectal Bleeding     patient reports at least 8 episodes of bright red blood rectal bleeding since last night  reports low abdominal pain near suprapubic area  denies n/v admits to increased weakness       Physician Requesting Consult: DO ALEXX Hale  Germaine aPte is a 56y o  year old female who reports she was in her usual state of health until about a week and a half ago when she developed lower abdominal pain she was noted to have tenderness a CT scan was ordered which showed diverticulitis and she was started on antibiotics specifically Augmentin  She said she felt better pretty quickly within 48 hours    Prior to this admission last evening she reports having some vague crampy abdominal discomfort very mild followed by passing a moderate amount of blood  Then a large amount of blood  She came to the ER  Since being here she has had 2 bloody bowel movements  She has a vague abdominal ache no severe pain    She is on Eliquis for atrial fibrillation she said she took her last dose yesterday she did not take any today    Historical Information   Past Medical History:   Diagnosis Date    Alcohol abuse 2020    Alcohol abuse 2020    Anemia     Atrial fibrillation (HCC)     Cervical radiculopathy     [facility]estive [facility] failure) [facility]     Chronic low back pain     [facility] 2018    Community acquired pneumonia     Community acquired pneumonia 2020    Depression with anxiety 3/9/2014    Diabetes mellitus ([facility] )     Diabetes mellitus with hyperglycemia ([facility])     Elevated liver enzymes     GERD (gastroesophageal reflux disease)     Hypersomnolence 10/28/2020    Hypokalemia 2018    Paresthesia of upper extremity     Plantar fasciitis     Restless leg     Restless legs syndrome 2014    Sexual dysfunction     Sleep apnea, obstructive     Tenosynovitis of finger     Tinea corporis     Tobacco use 2018    Currently smoking 3-4 cigarettes daily    Trigger middle finger of left hand 2017    Trigger ring finger of left hand 2017    Weakness of upper extremity      Past Surgical History:   Procedure Laterality Date    ABDOMINAL SURGERY      CARPAL TUNNEL RELEASE Bilateral      SECTION      CHOLECYSTECTOMY      laparoscopic    ESOPHAGOGASTRODUODENOSCOPY N/A 12/3/2018    Procedure: ESOPHAGOGASTRODUODENOSCOPY (EGD) AND COLONOSCOPY;  Surgeon: Dwayne Martinez MD;  Location: [facility] OR;  Service: Gastroenterology    GASTRIC BYPASS      for morbid obesity with gilda en y   [address] [facility] Left 2017    Procedure: LONG FINGER GANGLION CYST EXCISION;  Surgeon: Zbigniew Martinez MD;  Location: [facility] OR;  Service: Orthopedics    UT INCISE FINGER TENDON SHEATH Left 2017    Procedure: Siobhan Tessa, RING, TRIGGER FINGER RELEASE;  Surgeon: Zbigniew Martinez MD;  Location: [facility];  Service: Orthopedics    SHOULDER SURGERY       Social History   Social History     Substance and Sexual Activity   Alcohol Use Not Currently    Alcohol/week: 20 0 standard drinks    Types: 20 Cans of beer per week    Frequency: 4 or more times a week    Drinks per session: 3 or 4    Binge frequency: Never    Comment: about 6 coors light every night, stopped 3 weeks ago      Social History     Substance and Sexual Activity   Drug Use No     Social History     Tobacco Use   Smoking Status Former Smoker    Packs/day: 0 25    Years: 29 00    Pack years: 7 25    Types: Cigarettes    Start date: 200    Quit date: 12/10/2019    Years since quittin 4   Smokeless Tobacco Never Used     Family History   Problem Relation Age of Onset    Alzheimer's disease Mother     Atrial fibrillation Mother     Dementia Mother     Heart disease Mother         heart problem    Seizures Mother     Parkinsonism Father     Arthritis Father     Atrial fibrillation Father     Substance Abuse Neg Hx         mother,father    Mental illness Neg Hx         mother,father       Meds/Allergies     Current Facility-Administered Medications   Medication Dose Route Frequency    albuterol (PROVENTIL HFA,VENTOLIN HFA) inhaler 2 puff  2 puff Inhalation Q4H PRN    atorvastatin (LIPITOR) tablet 40 mg  40 mg Oral Daily With Dinner    [START ON 2021] citalopram (CeleXA) tablet 20 mg  20 mg Oral Daily    flecainide (TAMBOCOR) tablet 100 mg  100 mg Oral Q12H [address]    fluticasone (FLONASE) 50 mcg/act nasal spray 1 spray  1 spray Nasal BID    fluticasone (FLOVENT HFA) 220 mcg/act inhaler 2 puff  2 puff Inhalation BID    insulin lispro (HumaLOG) 100 units/mL subcutaneous injection 1-6 Units  1-6 Units Subcutaneous TID AC    insulin lispro (HumaLOG) 100 units/mL subcutaneous injection 1-6 Units  1-6 Units Subcutaneous HS    levalbuterol (XOPENEX) inhalation solution 1 25 mg  1 25 mg Nebulization BID    [START ON 6/2/2021] loratadine (CLARITIN) tablet 10 mg  10 mg Oral Daily    montelukast (SINGULAIR) tablet 10 mg  10 mg Oral HS    pantoprazole (PROTONIX) injection 40 mg  40 mg Intravenous Q12H [facility] & half-way    piperacillin-tazobactam (ZOSYN) IVPB 3 375 g  3 375 g Intravenous Q6H    traZODone (DESYREL) tablet 150 mg  150 mg Oral HS     (Not in a hospital admission)      Allergies   Allergen Reactions    Iron Dextran Swelling    Januvia [Sitagliptin] Shortness Of Breath    Jardiance [Empagliflozin] Shortness Of Breath    Clonazepam Other (See Comments)     Unknown reaction    Codeine Itching and Other (See Comments)     itch;mary kay morphine,takes ultram @home    Adhesive [Medical Tape] Itching     itching    Effexor [Venlafaxine] Itching    Lactose - Food Allergy GI Intolerance    Oxycodone-Acetaminophen Anxiety    Oxycodone-Acetaminophen Itching and Rash     Other reaction(s): Other (See Comments)  (PERCOCET) MILD RASH, not allergic to Acetaminophen        PHYSICAL EXAM    Blood pressure 100/61, pulse 100, temperature 98 °F (36 7 °C), temperature source Oral, resp  rate 20, height 5' 3" (1 6 m), weight 129 kg (285 lb), SpO2 97 %, not currently breastfeeding  Body mass index is 50 49 kg/m²  General Appearance: NAD, cooperative, alert  Eyes: Anicteric, PERRLA, EOMI pale  ENT:  Normocephalic, atraumatic, normal mucosa  Neck:  Supple, symmetrical, trachea midline  GI:  Soft, slightly distended with mild diffuse tenderness; no masses, no organomegaly   Rectal: Deferred  Musculoskeletal: No cyanosis, clubbing or edema  Normal ROM    Skin:  No jaundice, rashes, or lesions   Heme/Lymph: No palpable cervical lymphadenopathy  Psych: Normal affect, good eye contact  Neuro: No gross deficits, AAOx3    Lab Results   Component Value Date    GLUCOSE 131 (H) 09/22/2017    CALCIUM 8 6 06/01/2021     09/22/2017    K 4 2 06/01/2021    CO2 33 (H) 06/01/2021    CL 93 (L) 06/01/2021    BUN 10 06/01/2021    CREATININE 0 76 06/01/2021     Lab Results   Component Value Date    WBC 15 85 (H) 06/01/2021    HGB 7 2 (L) 06/01/2021    HCT 24 4 (L) 06/01/2021    MCV 73 (L) 06/01/2021     06/01/2021     Lab Results   Component Value Date    ALT 91 (H) 06/01/2021    AST 51 (H) 06/01/2021    ALKPHOS 176 (H) 06/01/2021    BILITOT 0 4 09/22/2017     Lab Results   Component Value Date    AMYLASE 31 03/09/2020    AMYLASE 12 (L) 02/14/2017     Lab Results   Component Value Date    LIPASE 72 (L) 06/01/2021     Lab Results   Component Value Date    IRON 24 (L) 05/05/2021    TIBC 435 05/05/2021    FERRITIN 46 05/05/2021     Lab Results   Component Value Date    INR 1 15 06/01/2021       Imaging Studies: I have personally reviewed pertinent reports  EKG, Pathology, and Other Studies: I have personally reviewed pertinent reports  REVIEW OF SYSTEMS:    CONSTITUTIONAL: Denies any fever, chills, rigors, and weight loss  HEENT: No earache or tinnitus  Denies hearing loss or visual disturbances  CARDIOVASCULAR: No chest pain or palpitations  RESPIRATORY: Denies any cough, hemoptysis, shortness of breath or dyspnea on exertion  GASTROINTESTINAL: As noted in the History of Present Illness  GENITOURINARY: No problems with urination  Denies any hematuria or dysuria  NEUROLOGIC: No dizziness or vertigo, denies headaches  MUSCULOSKELETAL: Denies any muscle or joint pain  SKIN: Denies skin rashes or itching  ENDOCRINE: Denies excessive thirst  Denies intolerance to heat or cold  PSYCHOSOCIAL: Denies depression or anxiety  Denies any recent memory loss

## 2021-06-01 NOTE — H&P
[address] 1960, 56 y o  female MRN: [id number]  Unit/Bed#: ED 10 Encounter: [id number]  Primary Care Provider: Santino Guillen MD   Date and time admitted to hospital: 6/1/2021  9:40 AM    * Severe sepsis [facility])  Assessment & Plan  · Presented to the emergency department with bright red blood per rectum, abdominal pain  Currently being treated for diverticulitis as an outpatient on oral Augmentin, last dose today  · Patient met severe sepsis criteria with tachycardia, leukocytosis and lactic acidosis possibly in setting of ? Ischemic colitis, also concern for possible sinus tract between the ovary and sigmoid colon however no obvious abscess  · Received 30 cc/kg in the ED of IVF with improvement in lactic acidosis, repeat pending in 2 hours  · Vital signs stable at time of admission  · ED attending discussed with General surgery - no surgical interventions warranted at this time  Recommended continuing IV abx  Also discussed with gastroenterology  · Will check urinalysis, CXR  · Follow-up blood cultures x2  · Started on IV Zosyn in the ED  · Trend WBC and fever curve    Lower gastrointestinal bleeding  Assessment & Plan  · Recently treated for diverticulitis on oral Augmentin, last dose today  · Began with several episodes of bright red blood per rectum and associated abdominal pain, lactic acidosis  · CT abdomen/pelvis noted with improving inflammation from diverticulitis - ? Given abdominal pain and elevated lactic acidosis consideration for ischemic colitis  · ED attending discussed with General surgery, no surgical interventions at this time  ED attending also discussed with gastroenterology  · Anticoagulated on Eliquis, hold    Patient reports her last dose was yesterday, did not take any of her home medications today  · Trend H/H q 6 hours and transfuse as needed - repeat H/H now, ED attending obtained blood consent  · IV Protonix b i d  · Discussed with GI, OK for clear liquids    Acute blood loss anemia  Assessment & Plan  · Does have chronic anemia, hemoglobin baseline appears to be around 9-10  · Hemoglobin 8 5 on admission likely in setting of lower gastrointestinal bleed  · Trend H/H q 6 hours and transfuse as needed - check H/H now  · Hold Eliquis    Chronic respiratory failure with hypoxia ([address] )  Assessment & Plan  · Patient is chronically on 4 L as an outpatient  · Continue O2 supplementation to maintain SpO2 > 88%    Transaminitis  Assessment & Plan  · Noted as of 05/24/2021, down trending  · CT abdomen/pelvis - s/p cholecystectomy, hepatic steatosis  · Avoid hepatotoxic agents  · Trend CMP  · Check acute hepatitis panel    Lactic acidosis  Assessment & Plan  · Likely in setting of severe sepsis, recent diverticulitis - ? Consideration for ischemic colitis  · Initial lactic acid 4 7, on repeat has improved to 3 7  · Trend lactic acid Q 2 hours  · Anion gap normal on BMP    Chronic diastolic congestive heart failure (HCC)  Assessment & Plan  Wt Readings from Last 3 Encounters:   06/01/21 129 kg (285 lb)   05/13/21 127 kg (281 lb)   05/02/21 129 kg (285 lb 6 4 oz)     · Appears euvolemic on exam  · Maintained on torsemide as an outpatient - hold in setting of gastrointestinal bleed  · Monitor volume status closely as patient received IVF per sepsis protocol  · Daily weights, I/O, low-sodium diet  · Echocardiogram November 2020 EF 93% grade 2 diastolic dysfunction    Atrial fibrillation (HCC)  Assessment & Plan  · Rate controlled on metoprolol, flecainide  · Anticoagulated on Eliquis - hold in setting of lower gastrointestinal bleed    Type 2 diabetes mellitus, without long-term current use of insulin (HCC)  Assessment & Plan  Lab Results   Component Value Date    HGBA1C 8 4 (A) 10/28/2020       No results for input(s): POCGLU in the last 72 hours      Blood Sugar Average: Last 72 hrs:  · Repeat hemoglobin A1c  · Hold oral agents while inpatient  · SSI plus Accu-Chek   · Diabetic diet    Esophageal reflux  Assessment & Plan  · Continue PPI    Benign essential hypertension  Assessment & Plan  · Blood pressure stable at time of admission  · Maintained on metoprolol, torsemide as an outpatient  · SBP low 100s, will hold metoprolol for now with close monitoring of blood pressures given severe sepsis    VTE Prophylaxis: Pharmacologic VTE Prophylaxis contraindicated due to Gastrointestinal bleed  / sequential compression device   Code Status:  Level 1 - full code  POLST: There is no POLST form on file for this patient (pre-hospital)  Discussion with family:  Discussed with patient directly at bedside  Declined family update  Anticipated Length of Stay:  Patient will be admitted on an Inpatient basis with an anticipated length of stay of  greater than 2 midnights  Justification for Hospital Stay:  Severe sepsis, gastrointestinal bleeding    Total Time for Visit, including Counseling / Coordination of Care: 90 minutes  Greater than 50% of this total time spent on direct patient counseling and coordination of care  Chief Complaint:   "I started having bleeding last night "    History of Present Illness:    Arley López is a 56 y o  female who presents with lower gastrointestinal bleeding  Past medical history significant for atrial fibrillation, congestive heart failure, chronic obstructive pulmonary disease, chronic respiratory failure with hypoxia, type 2 diabetes mellitus, morbid obesity, GERD  Patient presented to the emergency department after having approximately 8-9 bloody bowel movements since yesterday evening  Also noted lower abdominal cramping/discomfort  Denies any chest pain/palpitations, shortness of breath, nausea/vomiting  Patient had been started on oral Augmentin for treatment of diverticulitis with plan to complete 10 day course, patient to finish antibiotics this Thursday    She did not take any of her morning medications today including Eliquis, last dose was yesterday evening  Does admit to feeling fatigued  Denies any fever/chills, cough, dysuria, urinary frequency  Review of Systems:    Review of Systems   Constitutional: Positive for fatigue  Negative for chills, fever and unexpected weight change  HENT: Negative for congestion, sore throat and trouble swallowing  Eyes: Negative for photophobia, pain and visual disturbance  Respiratory: Negative for cough, shortness of breath and wheezing  Cardiovascular: Negative for chest pain, palpitations and leg swelling  Gastrointestinal: Positive for abdominal pain, blood in stool and diarrhea  Negative for constipation, nausea and vomiting  Endocrine: Negative for polyuria  Genitourinary: Negative for difficulty urinating, dysuria, flank pain, hematuria and urgency  Musculoskeletal: Negative for back pain, myalgias, neck pain and neck stiffness  Skin: Negative for pallor and rash  Neurological: Negative for dizziness, tremors, syncope, weakness, light-headedness, numbness and headaches  Hematological: Does not bruise/bleed easily  Psychiatric/Behavioral: Negative for agitation and confusion         Past Medical and Surgical History:     Past Medical History:   Diagnosis Date    Alcohol abuse 5/21/2020    Alcohol abuse 5/21/2020    Anemia     Atrial fibrillation (HCC)     Cervical radiculopathy     CHF (congestive heart failure) (HCC)     Chronic low back pain     Chronic obstructive asthma ([facility] ) 2/20/2018    Community acquired pneumonia     Community acquired pneumonia 5/21/2020    Depression with anxiety 3/9/2014    Diabetes mellitus ([facility] )     Diabetes mellitus with hyperglycemia (HCC)     Elevated liver enzymes     GERD (gastroesophageal reflux disease)     Hypersomnolence 10/28/2020    Hypokalemia 12/4/2018    Paresthesia of upper extremity     Plantar fasciitis     Restless leg     Restless legs syndrome 4/8/2014    Sexual dysfunction     Sleep apnea, obstructive     Tenosynovitis of finger     Tinea corporis     Tobacco use 2018    Currently smoking 3-4 cigarettes daily    Trigger middle finger of left hand 2017    Trigger ring finger of left hand 2017    Weakness of upper extremity        Past Surgical History:   Procedure Laterality Date    ABDOMINAL SURGERY      CARPAL TUNNEL RELEASE Bilateral      SECTION      CHOLECYSTECTOMY      laparoscopic    ESOPHAGOGASTRODUODENOSCOPY N/A 12/3/2018    Procedure: ESOPHAGOGASTRODUODENOSCOPY (EGD) AND COLONOSCOPY;  Surgeon: Jayla Albert MD;  Location: [facility];  Service: Gastroenterology    GASTRIC BYPASS      for morbid obesity with gilda en y   [address] WRIST Left 2017    Procedure: LONG FINGER GANGLION CYST EXCISION;  Surgeon: Elmer Grijalva MD;  Location[facility];  Service: Orthopedics    MN INCISE FINGER TENDON SHEATH Left 2017    Procedure: Colan Nip, RING, TRIGGER FINGER RELEASE;  Surgeon: Elmer Grijalva MD;  [facility];  Service: Orthopedics    SHOULDER SURGERY         Meds/Allergies:    Prior to Admission medications    Medication Sig Start Date End Date Taking?  Authorizing Provider   albuterol (PROVENTIL HFA,VENTOLIN HFA) 90 mcg/act inhaler Inhale 2 puffs every 4 (four) hours as needed for wheezing 20   Malvin Dubois PA-C   amoxicillin-clavulanate (AUGMENTIN) 875-125 mg per tablet Take 1 tablet by mouth every 12 (twelve) hours for 10 days 5/24/21 6/3/21  Ernst Giles DO   apixaban (Eliquis) 5 mg Take 1 tablet (5 mg total) by mouth 2 (two) times a day 3/8/21   Macho Ugarte MD   ascorbic acid (VITAMIN C) 1000 MG tablet Take 1,000 mg by mouth daily      Historical Provider, MD   atorvastatin (LIPITOR) 40 mg tablet Take 1 tablet (40 mg total) by mouth daily 21   Macho Ugarte MD   Blood Glucose Monitoring Suppl (Episencial CONTOUR NEXT MONITOR) w/Device KIT by Does not apply route daily 7/14/17   Historical MD Yen   Cholecalciferol 1000 units tablet Take 1,000 Units by mouth daily      Historical Provider, MD   citalopram (CeleXA) 20 mg tablet Take 1 tablet (20 mg total) by mouth daily 4/16/21   Laith Olivares MD   cyanocobalamin (VITAMIN B-12) 100 mcg tablet Take by mouth daily    Historical Provider, MD   ferrous sulfate (FeroSul) 220 (44 Fe) mg/5 mL solution Take 5 mL (220 mg total) by mouth 2 (two) times a day before meals 5/13/21 6/12/21  Naya Joy MD   flecainide (TAMBOCOR) 100 mg tablet Take 1 tablet (100 mg total) by mouth every 12 (twelve) hours 3/8/21   Laith Olivares MD   fluticasone (FLONASE) 50 mcg/act nasal spray 1 spray into each nostril 2 (two) times a day 9/23/20   Nilam Vaca MD   fluticasone (FLOVENT HFA) 220 mcg/act inhaler Inhale 2 puffs 2 (two) times a day Rinse mouth after use  4/26/21   Nilam Vaca MD   glimepiride (AMARYL) 4 mg tablet Take 1 tablet (4 mg total) by mouth daily with breakfast 3/8/21   Laith Olivares MD   glucose blood (CONTOUR NEXT TEST) test strip Test blood sugar 3 times a day 2/21/19   Han Castorena MD   glycopyrrolate-formoterol (BEVESPI AEROSPHERE) 9-4 8 MCG/ACT inhaler Inhale 2 puffs 2 (two) times a day 3/11/21   James Chamberlain PA-C   levalbuterol (XOPENEX) 1 25 mg/0 5 mL nebulizer solution Take 0 5 mL (1 25 mg total) by nebulization 2 (two) times a day 11/16/20   Yumiko Burnette DO   LIFESCAN UNISTIK II LANCETS MISC by Does not apply route 3 (three) times a day 2/27/19   Laith Olivares MD   loratadine (Claritin) 10 mg tablet Take by mouth    Historical Provider, MD   LORazepam (ATIVAN) 0 5 mg tablet TAKE 2 TABLETS BY MOUTH AT BEDTIME AND 1 EVERY 6 HOURS AS NEEDED FOR ANXIETY 5/7/21   Laith Olivares MD   Magnesium 400 MG CAPS Take by mouth 1 BID    Historical Provider, MD   metFORMIN (GLUCOPHAGE-XR) 500 mg 24 hr tablet Take 2 tablets (1,000 mg total) by mouth 2 (two) times a day with meals 5/7/21   Laith Olivares MD   metoprolol succinate (TOPROL-XL) 50 mg 24 hr tablet Take 1 tablet (50 mg total) by mouth daily 2/5/21   Laith Olivares MD   montelukast (SINGULAIR) 10 mg tablet Take 1 tablet (10 mg total) by mouth daily at bedtime 11/4/20   Laith Olivares MD   naloxone (NARCAN) 4 mg/0 1 mL nasal spray Administer 1 spray into a nostril  If breathing does not return to normal or if breathing difficulty resumes after 2-3 minutes, give another dose in the other nostril using a new spray  7/29/20   Laith Olivares MD   omeprazole (PriLOSEC) 40 MG capsule Take 1 capsule (40 mg total) by mouth daily 2/5/21   Laith Olivares MD   potassium chloride (K-DUR,KLOR-CON) 20 mEq tablet Take 1 tablet (20 mEq total) by mouth 2 (two) times a day 3/8/21   Laith Olivares MD   torsemide (DEMADEX) 20 mg tablet Two tabs b i d  4/16/21   Laith Olivares MD   traZODone (DESYREL) 150 mg tablet TAKE 1 TABLET BY MOUTH AT BEDTIME 5/9/21   Laith Olivares MD     I have reviewed home medications with patient personally  Allergies: Allergies   Allergen Reactions    Iron Dextran Swelling    Januvia [Sitagliptin] Shortness Of Breath    Jardiance [Empagliflozin] Shortness Of Breath    Clonazepam Other (See Comments)     Unknown reaction    Codeine Itching and Other (See Comments)     itch;mary kay morphine,takes ultram @home    Adhesive [Medical Tape] Itching     itching    Effexor [Venlafaxine] Itching    Lactose - Food Allergy GI Intolerance    Oxycodone-Acetaminophen Anxiety    Oxycodone-Acetaminophen Itching and Rash     Other reaction(s):  Other (See Comments)  (PERCOCET) MILD RASH, not allergic to Acetaminophen        Social History:     Marital Status: Significant Other   Occupation:   Patient Pre-hospital Living Situation:   Patient Pre-hospital Level of Mobility: Ambulatory  Patient Pre-hospital Diet Restrictions: Diabetic  Substance Use History:   Social History     Substance and Sexual Activity   Alcohol Use Not Currently    Alcohol/week: 20 0 standard drinks    Types: 20 Cans of beer per week    Frequency: 4 or more times a week    Drinks per session: 3 or 4    Binge frequency: Never    Comment: about 6 coors light every night, stopped 3 weeks ago      Social History     Tobacco Use   Smoking Status Former Smoker    Packs/day: 0 25    Years: 29 00    Pack years: 7 25    Types: Cigarettes    Start date: 200    Quit date: 12/10/2019    Years since quittin 4   Smokeless Tobacco Never Used     Social History     Substance and Sexual Activity   Drug Use No       Family History:    Family History   Problem Relation Age of Onset    Alzheimer's disease Mother     Atrial fibrillation Mother     Dementia Mother     Heart disease Mother         heart problem    Seizures Mother     Parkinsonism Father     Arthritis Father     Atrial fibrillation Father     Substance Abuse Neg Hx         mother,father    Mental illness Neg Hx         mother,father       Physical Exam:     Vitals:   Blood Pressure: 105/51 (21 1430)  Pulse: 101 (21 1430)  Temperature: 98 7 °F (37 1 °C) (21 1315)  Temp Source: Oral (21 1315)  Respirations: 15 (21 1430)  Height: 5' 3" (160 cm) (21 0943)  Weight - Scale: 129 kg (285 lb) (21 0943)  SpO2: 98 % (21 1430)    Physical Exam  Vitals signs and nursing note reviewed  Constitutional:       Appearance: Normal appearance  Interventions: Nasal cannula in place  Comments: Appears comfortable, no acute distress   HENT:      Head: Normocephalic  Eyes:      General: No scleral icterus  Extraocular Movements: Extraocular movements intact  Conjunctiva/sclera: Conjunctivae normal    Neck:      Musculoskeletal: Normal range of motion  Cardiovascular:      Rate and Rhythm: Normal rate and regular rhythm        Heart sounds: S1 normal and S2 normal  Pulmonary:      Breath sounds: Decreased breath sounds present  No wheezing, rhonchi or rales  Comments: Overall diminished breath sounds bilaterally  Abdominal:      General: Bowel sounds are normal       Palpations: Abdomen is soft  Tenderness: There is abdominal tenderness in the right lower quadrant, suprapubic area and left lower quadrant  There is no guarding or rebound  Musculoskeletal:         General: No swelling, tenderness or deformity  Comments: Able to move upper/lower extremities bilaterally, trace pedal edema   Skin:     General: Skin is warm and dry  Coloration: Skin is pale  Neurological:      Mental Status: She is alert and oriented to person, place, and time  Psychiatric:         Mood and Affect: Mood normal          Speech: Speech normal          Behavior: Behavior normal            Additional Data:     Lab Results: I have personally reviewed pertinent reports  Results from last 7 days   Lab Units 06/01/21  1421 06/01/21  1006   WBC Thousand/uL  --  15 85*   HEMOGLOBIN g/dL 7 2* 8 5*   HEMATOCRIT % 24 4* 28 7*   PLATELETS Thousands/uL  --  288   NEUTROS PCT %  --  73   LYMPHS PCT %  --  17   MONOS PCT %  --  7   EOS PCT %  --  2     Results from last 7 days   Lab Units 06/01/21  1006   SODIUM mmol/L 136   POTASSIUM mmol/L 4 2   CHLORIDE mmol/L 93*   CO2 mmol/L 33*   BUN mg/dL 10   CREATININE mg/dL 0 76   ANION GAP mmol/L 10   CALCIUM mg/dL 8 6   ALBUMIN g/dL 3 0*   TOTAL BILIRUBIN mg/dL 0 30   ALK PHOS U/L 176*   ALT U/L 91*   AST U/L 51*   GLUCOSE RANDOM mg/dL 316*     Results from last 7 days   Lab Units 06/01/21  1006   INR  1 15             Results from last 7 days   Lab Units 06/01/21  1432 06/01/21  1227 06/01/21  1006   LACTIC ACID mmol/L 3 2* 3 7* 4 7*       Imaging: I have personally reviewed pertinent reports  CT abdomen pelvis with contrast   Final Result by Jaki Javier MD (06/01 4983)      1    Interval improvement in inflammatory changes surrounding the previously inflamed loop of sigmoid colon, with mild residual wall thickening  2   Linear soft tissue density connecting this loop of sigmoid colon with the left ovary, may represent sinus tract  Since patient is status post hysterectomy, if present, a sinus tract would present with abscess formation as opposed to vaginal    discharge  Currently, no abscess present  If patient develops constitutional symptoms following completion of diverticulitis treatment, consider follow-up CT abdomen and pelvis to evaluate for abscess  3   Hepatic steatosis            Workstation performed: [id number]         XR chest portable    (Results Pending)       EKG, Pathology, and Other Studies Reviewed on Admission:   · EKG:  Sinus tachycardia    Allscripts / Epic Records Reviewed: Yes     ** Please Note: This note has been constructed using a voice recognition system   **

## 2021-06-01 NOTE — ED PROVIDER NOTES
History  Chief Complaint   Patient presents with    Rectal Bleeding     patient reports at least 8 episodes of bright red blood rectal bleeding since last night  reports low abdominal pain near suprapubic area  denies n/v admits to increased weakness     Patient is a 75-year-old female with past medical history significant for atrial fibrillation on chronic anticoagulation, chronic diastolic congestive heart failure, COPD with chronic respiratory failure on 2 L oxygen via nasal cannula, morbid obesity, obstructive sleep apnea, diabetes, diagnosed with acute diverticulitis on 05/24/2021, prescribed Augmentin who today presents with 8 episodes of bright red blood per rectum as well as abdominal pain  Patient reports that she is on her last day of Augmentin tomorrow, but last night started to develop bright red blood in her stools  Today she reports that she has some mild suprapubic pain, constant, nonradiating, crampy in nature in addition to multiple more episodes of bright red blood in her stools  Patient states that even when she feels like she just needs to pass gas, she passes small amounts of stool with bright red blood  Denies any clots  States that she feels fatigued  Prior to Admission Medications   Prescriptions Last Dose Informant Patient Reported? Taking?    Blood Glucose Monitoring Suppl ([employer] CONTOUR NEXT MONITOR) w/Device KIT  Self Yes No   Sig: by Does not apply route daily   Cholecalciferol 1000 units tablet  Self Yes No   Sig: Take 1,000 Units by mouth daily     TouchbaseCAN UNISTIK II LANCETS MISC  Self No No   Sig: by Does not apply route 3 (three) times a day   LORazepam (ATIVAN) 0 5 mg tablet   No No   Sig: TAKE 2 TABLETS BY MOUTH AT BEDTIME AND 1 EVERY 6 HOURS AS NEEDED FOR ANXIETY   Magnesium 400 MG CAPS  Self Yes No   Sig: Take by mouth 1 BID   albuterol (PROVENTIL HFA,VENTOLIN HFA) 90 mcg/act inhaler  Self No No   Sig: Inhale 2 puffs every 4 (four) hours as needed for wheezing amoxicillin-clavulanate (AUGMENTIN) 875-125 mg per tablet   No No   Sig: Take 1 tablet by mouth every 12 (twelve) hours for 10 days   apixaban (Eliquis) 5 mg  Self No No   Sig: Take 1 tablet (5 mg total) by mouth 2 (two) times a day   ascorbic acid (VITAMIN C) 1000 MG tablet  Self Yes No   Sig: Take 1,000 mg by mouth daily     atorvastatin (LIPITOR) 40 mg tablet   No No   Sig: Take 1 tablet (40 mg total) by mouth daily   citalopram (CeleXA) 20 mg tablet  Self No No   Sig: Take 1 tablet (20 mg total) by mouth daily   cyanocobalamin (VITAMIN B-12) 100 mcg tablet  Self Yes No   Sig: Take by mouth daily   ferrous sulfate (FeroSul) 220 (44 Fe) mg/5 mL solution   No No   Sig: Take 5 mL (220 mg total) by mouth 2 (two) times a day before meals   flecainide (TAMBOCOR) 100 mg tablet  Self No No   Sig: Take 1 tablet (100 mg total) by mouth every 12 (twelve) hours   fluticasone (FLONASE) 50 mcg/act nasal spray  Self No No   Si spray into each nostril 2 (two) times a day   fluticasone (FLOVENT HFA) 220 mcg/act inhaler   No No   Sig: Inhale 2 puffs 2 (two) times a day Rinse mouth after use     glimepiride (AMARYL) 4 mg tablet  Self No No   Sig: Take 1 tablet (4 mg total) by mouth daily with breakfast   glucose blood (CONTOUR NEXT TEST) test strip  Self No No   Sig: Test blood sugar 3 times a day   glycopyrrolate-formoterol (BEVESPI AEROSPHERE) 9-4 8 MCG/ACT inhaler  Self No No   Sig: Inhale 2 puffs 2 (two) times a day   levalbuterol (XOPENEX) 1 25 mg/0 5 mL nebulizer solution  Self No No   Sig: Take 0 5 mL (1 25 mg total) by nebulization 2 (two) times a day   loratadine (Claritin) 10 mg tablet  Self Yes No   Sig: Take by mouth   metFORMIN (GLUCOPHAGE-XR) 500 mg 24 hr tablet   No No   Sig: Take 2 tablets (1,000 mg total) by mouth 2 (two) times a day with meals   metoprolol succinate (TOPROL-XL) 50 mg 24 hr tablet  Self No No   Sig: Take 1 tablet (50 mg total) by mouth daily   montelukast (SINGULAIR) 10 mg tablet  Self No No Sig: Take 1 tablet (10 mg total) by mouth daily at bedtime   naloxone (NARCAN) 4 mg/0 1 mL nasal spray  Self No No   Sig: Administer 1 spray into a nostril  If breathing does not return to normal or if breathing difficulty resumes after 2-3 minutes, give another dose in the other nostril using a new spray     omeprazole (PriLOSEC) 40 MG capsule  Self No No   Sig: Take 1 capsule (40 mg total) by mouth daily   potassium chloride (K-DUR,KLOR-CON) 20 mEq tablet  Self No No   Sig: Take 1 tablet (20 mEq total) by mouth 2 (two) times a day   torsemide (DEMADEX) 20 mg tablet  Self No No   Sig: Two tabs b i d    traZODone (DESYREL) 150 mg tablet   No No   Sig: TAKE 1 TABLET BY MOUTH AT BEDTIME      Facility-Administered Medications: None       Past Medical History:   Diagnosis Date    Alcohol abuse 2020    Alcohol abuse 2020    Anemia     Atrial fibrillation (HCC)     Cervical radiculopathy     [facility])     Chronic low back pain     Chronic obstructive asthma ([facility] ) 2018    Community acquired pneumonia     Community acquired pneumonia 2020    Depression with anxiety 3/9/2014    Diabetes mellitus ([facility] )     Diabetes mellitus with hyperglycemia (HCC)     Elevated liver enzymes     GERD (gastroesophageal reflux disease)     Hypersomnolence 10/28/2020    Hypokalemia 2018    Paresthesia of upper extremity     Plantar fasciitis     Restless leg     Restless legs syndrome 2014    Sexual dysfunction     Sleep apnea, obstructive     Tenosynovitis of finger     Tinea corporis     Tobacco use 2018    Currently smoking 3-4 cigarettes daily    Trigger middle finger of left hand 2017    Trigger ring finger of left hand 2017    Weakness of upper extremity        Past Surgical History:   Procedure Laterality Date    ABDOMINAL SURGERY      CARPAL TUNNEL RELEASE Bilateral      SECTION      CHOLECYSTECTOMY      laparoscopic    ESOPHAGOGASTRODUODENOSCOPY N/A 12/3/2018    Procedure: ESOPHAGOGASTRODUODENOSCOPY (EGD) AND COLONOSCOPY;  Surgeon: Rick Sanchez MD;  [facility];  Service: Gastroenterology    GASTRIC BYPASS      for morbid obesity with gilda en y   [address] PRIMARY GANGLION WRIST Left 2017    Procedure: LONG FINGER GANGLION CYST EXCISION;  Surgeon: Yisel Onofre MD;  [facility];  Service: Orthopedics    ME INCISE FINGER TENDON SHEATH Left 2017    Procedure: Андрей Gourd, RING, TRIGGER FINGER RELEASE;  Surgeon: Yisel Onofre MD;  [facility];  Service: Orthopedics    SHOULDER SURGERY         Family History   Problem Relation Age of Onset    Alzheimer's disease Mother     Atrial fibrillation Mother     Dementia Mother     Heart disease Mother         heart problem    Seizures Mother     Parkinsonism Father     Arthritis Father     Atrial fibrillation Father     Substance Abuse Neg Hx         mother,father    Mental illness Neg Hx         mother,father     I have reviewed and agree with the history as documented  E-Cigarette/Vaping    E-Cigarette Use Never User      E-Cigarette/Vaping Substances    Nicotine No     THC No     CBD No     Flavoring No     Other No     Unknown No      Social History     Tobacco Use    Smoking status: Former Smoker     Packs/day: 0 25     Years: 29 00     Pack years: 7 25     Types: Cigarettes     Start date: 200     Quit date: 12/10/2019     Years since quittin 4    Smokeless tobacco: Never Used   Substance Use Topics    Alcohol use: Not Currently     Alcohol/week: 20 0 standard drinks     Types: 20 Cans of beer per week     Frequency: 4 or more times a week     Drinks per session: 3 or 4     Binge frequency: Never     Comment: about 6 coors light every night, stopped 3 weeks ago     Drug use: No       Review of Systems   Constitutional: Positive for fatigue   Negative for chills and fever    HENT: Negative for congestion and rhinorrhea  Eyes: Negative for photophobia and visual disturbance  Respiratory: Negative for cough and shortness of breath  Cardiovascular: Negative for chest pain and palpitations  Gastrointestinal: Positive for abdominal pain and blood in stool  Negative for constipation, diarrhea, nausea and vomiting  Genitourinary: Negative for dysuria, flank pain and hematuria  Musculoskeletal: Negative for back pain and neck pain  Skin: Negative for color change and pallor  Neurological: Negative for dizziness, weakness, light-headedness, numbness and headaches  Physical Exam  Physical Exam  Vitals signs and nursing note reviewed  Constitutional:       General: She is not in acute distress  Appearance: Normal appearance  She is obese  She is not ill-appearing, toxic-appearing or diaphoretic  HENT:      Head: Normocephalic and atraumatic  Mouth/Throat:      Mouth: Mucous membranes are moist    Eyes:      Conjunctiva/sclera: Conjunctivae normal       Pupils: Pupils are equal, round, and reactive to light  Neck:      Musculoskeletal: Normal range of motion and neck supple  Cardiovascular:      Rate and Rhythm: Regular rhythm  Tachycardia present  Pulses: Normal pulses  Heart sounds: Normal heart sounds  No murmur  Pulmonary:      Effort: Pulmonary effort is normal  No respiratory distress  Breath sounds: Normal breath sounds  No stridor  No wheezing, rhonchi or rales  Chest:      Chest wall: No tenderness  Abdominal:      General: Bowel sounds are normal  There is no distension  Palpations: Abdomen is soft  Tenderness: There is abdominal tenderness in the suprapubic area and left lower quadrant  There is no right CVA tenderness, left CVA tenderness, guarding or rebound  Negative signs include Kaur's sign, Rovsing's sign, McBurney's sign and psoas sign  Genitourinary:     Rectum: Guaiac result positive   External hemorrhoid present  No tenderness or anal fissure  Normal anal tone  Musculoskeletal:      Right lower leg: Edema present  Left lower leg: Edema present  Skin:     General: Skin is warm and dry  Coloration: Skin is pale  Neurological:      General: No focal deficit present  Mental Status: She is alert and oriented to person, place, and time  Mental status is at baseline     Psychiatric:         Mood and Affect: Mood normal          Behavior: Behavior normal          Vital Signs  ED Triage Vitals   Temperature Pulse Respirations Blood Pressure SpO2   06/01/21 0943 06/01/21 0945 06/01/21 0945 06/01/21 0945 06/01/21 0945   (!) 97 3 °F (36 3 °C) (!) 112 22 138/60 98 %      Temp Source Heart Rate Source Patient Position - Orthostatic VS BP Location FiO2 (%)   06/01/21 1315 06/01/21 1115 06/01/21 1145 06/01/21 1115 --   Oral Monitor Sitting Right arm       Pain Score       06/01/21 0943       6           Vitals:    06/01/21 1415 06/01/21 1430 06/01/21 1539 06/01/21 1556   BP: 116/55 105/51 107/51 100/61   Pulse: 100 101 97 100   Patient Position - Orthostatic VS:             Visual Acuity      ED Medications  Medications   pantoprazole (PROTONIX) injection 40 mg (40 mg Intravenous Given 6/1/21 1410)   piperacillin-tazobactam (ZOSYN) IVPB 3 375 g (has no administration in time range)   albuterol (PROVENTIL HFA,VENTOLIN HFA) inhaler 2 puff (has no administration in time range)   atorvastatin (LIPITOR) tablet 40 mg (has no administration in time range)   citalopram (CeleXA) tablet 20 mg (has no administration in time range)   flecainide (TAMBOCOR) tablet 100 mg (has no administration in time range)   fluticasone (FLONASE) 50 mcg/act nasal spray 1 spray (has no administration in time range)   fluticasone (FLOVENT HFA) 220 mcg/act inhaler 2 puff (has no administration in time range)   levalbuterol (XOPENEX) inhalation solution 1 25 mg (has no administration in time range)   loratadine (CLARITIN) tablet 10 mg (has no administration in time range)   montelukast (SINGULAIR) tablet 10 mg (has no administration in time range)   traZODone (DESYREL) tablet 150 mg (has no administration in time range)   insulin lispro (HumaLOG) 100 units/mL subcutaneous injection 1-6 Units (has no administration in time range)   insulin lispro (HumaLOG) 100 units/mL subcutaneous injection 1-6 Units (has no administration in time range)   piperacillin-tazobactam (ZOSYN) IVPB 4 5 g (0 g Intravenous Stopped 6/1/21 1118)   multi-electrolyte (PLASMALYTE-A/ISOLYTE-S PH 7 4) IV solution 1,000 mL (0 mL Intravenous Stopped 6/1/21 1233)     Followed by   multi-electrolyte (PLASMALYTE-A/ISOLYTE-S PH 7 4) IV solution 1,000 mL (0 mL Intravenous Stopped 6/1/21 1328)   iohexol (OMNIPAQUE) 350 MG/ML injection (SINGLE-DOSE) 100 mL (100 mL Intravenous Given 6/1/21 1126)   ondansetron (ZOFRAN) injection 4 mg (4 mg Intravenous Given 6/1/21 1410)       Diagnostic Studies  Results Reviewed     Procedure Component Value Units Date/Time    Serial Hemoglobin Q6hrs [[id number]]     Lab Status: No result Specimen: Blood     Procalcitonin with AM Reflex [[id number]]  (Normal) Collected: 06/01/21 1103    Lab Status: Final result Specimen: Blood from Arm, Left Updated: 06/01/21 1535     Procalcitonin <0 05 ng/ml     Procalcitonin Reflex [[id number]]     Lab Status: No result Specimen: Blood     UA (URINE) with reflex to Scope [[id number]]  (Abnormal) Collected: 06/01/21 1522    Lab Status: Final result Specimen: Urine, Clean Catch Updated: 06/01/21 1528     Color, UA Yellow     Clarity, UA Clear     Specific Gravity, UA <=1 005     pH, UA 6 5     Leukocytes, UA Negative     Nitrite, UA Negative     Protein, UA Negative mg/dl      Glucose,  (1/4%) mg/dl      Ketones, UA Negative mg/dl      Urobilinogen, UA 0 2 E U /dl      Bilirubin, UA Negative     Blood, UA Negative    Lactic acid, plasma [[id number]]  (Abnormal) Collected: 06/01/21 1432    Lab Status: Final result Specimen: Blood from Arm, Left Updated: 06/01/21 1511     LACTIC ACID 3 2 mmol/L     Narrative:      Result may be elevated if tourniquet was used during collection  Lactic acid 2 Hours [[id number]]     Lab Status: No result Specimen: Blood     Hemoglobin and hematocrit, blood [[id number]]  (Abnormal) Collected: 06/01/21 1421    Lab Status: Final result Specimen: Blood from Hand, Left Updated: 06/01/21 1426     Hemoglobin 7 2 g/dL      Hematocrit 24 4 %     Lactic acid 2 Hours [[id number]]  (Abnormal) Collected: 06/01/21 1227    Lab Status: Final result Specimen: Blood from Arm, Right Updated: 06/01/21 1258     LACTIC ACID 3 7 mmol/L     Narrative:      Result may be elevated if tourniquet was used during collection  Blood culture #2 [[id number]] Collected: 06/01/21 1103    Lab Status: In process Specimen: Blood from Arm, Right Updated: 06/01/21 1111    Blood culture #1 [[id number]] Collected: 06/01/21 1103    Lab Status: In process Specimen: Blood from Arm, Left Updated: 06/01/21 1111    Lactic acid [[id number]]  (Abnormal) Collected: 06/01/21 1006    Lab Status: Final result Specimen: Blood from Arm, Left Updated: 06/01/21 1051     LACTIC ACID 4 7 mmol/L     Narrative:      Result may be elevated if tourniquet was used during collection      Lipase [[id number]]  (Abnormal) Collected: 06/01/21 1006    Lab Status: Final result Specimen: Blood from Arm, Left Updated: 06/01/21 1036     Lipase 72 u/L     Comprehensive metabolic panel [[id number]]  (Abnormal) Collected: 06/01/21 1006    Lab Status: Final result Specimen: Blood from Arm, Left Updated: 06/01/21 1036     Sodium 136 mmol/L      Potassium 4 2 mmol/L      Chloride 93 mmol/L      CO2 33 mmol/L      ANION GAP 10 mmol/L      BUN 10 mg/dL      Creatinine 0 76 mg/dL      Glucose 316 mg/dL      Calcium 8 6 mg/dL      Corrected Calcium 9 4 mg/dL      AST 51 U/L      ALT 91 U/L      Alkaline Phosphatase 176 U/L      Total Protein 6 3 g/dL      Albumin 3 0 g/dL Total Bilirubin 0 30 mg/dL      eGFR 84 ml/min/1 73sq m     Narrative:      National Kid[employer] guidelines for Chronic Kidney Disease (CKD):     Stage 1 with normal or high GFR (GFR > 90 mL/min/1 73 square meters)    Stage 2 Mild CKD (GFR = 60-89 mL/min/1 73 square meters)    Stage 3A Moderate CKD (GFR = 45-59 mL/min/1 73 square meters)    Stage 3B Moderate CKD (GFR = 30-44 mL/min/1 73 square meters)    Stage 4 Severe CKD (GFR = 15-29 mL/min/1 73 square meters)    Stage 5 End Stage CKD (GFR <15 mL/min/1 73 square meters)  Note: GFR calculation is accurate only with a steady state creatinine    Protime-INR [[id number]]  (Abnormal) Collected: 06/01/21 1006    Lab Status: Final result Specimen: Blood from Arm, Left Updated: 06/01/21 1034     Protime 14 7 seconds      INR 1 15    APTT [[id number]]  (Normal) Collected: 06/01/21 1006    Lab Status: Final result Specimen: Blood from Arm, Left Updated: 06/01/21 1034     PTT 28 seconds     CBC and differential [[id number]]  (Abnormal) Collected: 06/01/21 1006    Lab Status: Final result Specimen: Blood from Arm, Left Updated: 06/01/21 1017     WBC 15 85 Thousand/uL      RBC 3 92 Million/uL      Hemoglobin 8 5 g/dL      Hematocrit 28 7 %      MCV 73 fL      MCH 21 7 pg      MCHC 29 6 g/dL      RDW 18 4 %      MPV 11 9 fL      Platelets 321 Thousands/uL      nRBC 0 /100 WBCs      Neutrophils Relative 73 %      Immat GRANS % 1 %      Lymphocytes Relative 17 %      Monocytes Relative 7 %      Eosinophils Relative 2 %      Basophils Relative 0 %      Neutrophils Absolute 11 42 Thousands/µL      Immature Grans Absolute 0 12 Thousand/uL      Lymphocytes Absolute 2 73 Thousands/µL      Monocytes Absolute 1 15 Thousand/µL      Eosinophils Absolute 0 36 Thousand/µL      Basophils Absolute 0 07 Thousands/µL                  CT abdomen pelvis with contrast   Final Result by Keaton Rogers MD (06/01 9103)      1    Interval improvement in inflammatory changes surrounding the previously inflamed loop of sigmoid colon, with mild residual wall thickening  2   Linear soft tissue density connecting this loop of sigmoid colon with the left ovary, may represent sinus tract  Since patient is status post hysterectomy, if present, a sinus tract would present with abscess formation as opposed to vaginal    discharge  Currently, no abscess present  If patient develops constitutional symptoms following completion of diverticulitis treatment, consider follow-up CT abdomen and pelvis to evaluate for abscess     3   Hepatic steatosis            Workstation performed: [id number]         XR chest portable    (Results Pending)              Procedures  ECG 12 Lead Documentation Only    Date/Time: 6/1/2021 1:50 PM  Performed by: Emiliano Jordan DO  Authorized by: Emiliano Jordan DO     Indications / Diagnosis:  Abdominal pain  ECG reviewed by me, the ED Provider: yes    Patient location:  ED  Previous ECG:     Previous ECG:  Compared to current    Comparison ECG info:  4/15/2021    Similarity:  No change  Interpretation:     Interpretation: normal    Rate:     ECG rate:  108    ECG rate assessment: tachycardic    Rhythm:     Rhythm: sinus tachycardia    Ectopy:     Ectopy: none    QRS:     QRS axis:  Normal    QRS intervals:  Normal  Conduction:     Conduction: normal    ST segments:     ST segments:  Normal  T waves:     T waves: normal    Comments:      qtc [id number]    CriticalCare Time  Performed by: Emiliano Jordan DO  Authorized by: Emiliano Jordan DO     Critical care provider statement:     Critical care time (minutes):  67    Critical care start time:  6/1/2021 11:00 AM    Critical care end time:  6/1/2021 2:00 PM    Critical care time was exclusive of:  Separately billable procedures and treating other patients and teaching time    Critical care was necessary to treat or prevent imminent or life-threatening deterioration of the following conditions:  Sepsis    Critical care was time spent personally by me on the following activities:  Blood draw for specimens, obtaining history from patient or surrogate, development of treatment plan with patient or surrogate, discussions with consultants, discussions with primary provider, evaluation of patient's response to treatment, examination of patient, review of old charts, re-evaluation of patient's condition, ordering and review of radiographic studies, ordering and performing treatments and interventions and ordering and review of laboratory studies    I assumed direction of critical care for this patient from another provider in my specialty: no               ED Course  ED Course as of Jun 01 1623   Tue Jun 01, 2021   1025 10 8 eight days prior   Hemoglobin(!): 8 5   1306 LACTIC ACID(!!): 3 7   1308 Tigertexted surgery team to discuss clinical course and CT findings for further recommendations in setting of acute diverticulitis versus ischemic colitis       1312 Per Dr Miguel Angel Montilla, no surgical intervention required  Recommends admitting to medicine, continuing treatment for diverticulitis x 10 day course  1327 Discussed clinical course, imaging, and labs with Dr Doreatha Bosworth, gastroenterology  Recommends admission, trending hgb, serial abdominal exams  1331 Patient reassessed  She is now s/p 30ml/kg isolyte per IBW  Initial SBP in the 140s, now 109  Patient is still mentating, abdominal exam with mild tenderness in the suprapubic region and LLQ, but unchanged from initial  Repeat lactic improved  Made hospitalist aware of entire ED course and current vitals  Patient will be admitted to hospitalist service  Will recheck H&H to assess if the BRBPR is causing hypovolemia and contributing to patient's hypotension  1408 Patient's SBP rebounded and is now maintaining in the one-teens to 179R systolic  Repeat H&H pending  Got blood consent signed in case patient will require transfusion  1435 Will order 1 U PRBC at this time  Hemoglobin(!): 7 2                         Initial Sepsis Screening     Row Name 06/01/21 1206                Is the patient's history suggestive of a new or worsening infection? (!) Yes (Proceed)  -LG        Suspected source of infection  acute abdominal infection  -LG        Are two or more of the following signs & symptoms of infection both present and new to the patient? (!) Yes (Proceed)  -LG        Indicate SIRS criteria  Leukocytosis (WBC > 13497 IJL); Tachycardia > 90 bpm  -LG        If the answer is yes to both questions, suspicion of sepsis is present  --        If severe sepsis is present AND tissue hypoperfusion perists in the hour after fluid resuscitation or lactate > 4, the patient meets criteria for SEPTIC SHOCK  --        Are any of the following organ dysfunction criteria present within 6 hours of suspected infection and SIRS criteria that are NOT considered to be chronic conditions?   (!) Yes  -LG        Organ dysfunction  Lactate > 2 0 mmol/L;Lactate >/equal 4 0 mmol/L  -LG        Date of presentation of severe sepsis  --        Time of presentation of severe sepsis  1130  -LG        Tissue hypoperfusion persists in the hour after crystalloid fluid administration, evidenced, by either:  --        Was hypotension present within one hour of the conclusion of crystalloid fluid administration?  --        Date of presentation of septic shock  --        Time of presentation of septic shock  --          User Key  (r) = Recorded By, (t) = Taken By, (c) = Cosigned By    [address] Name Provider Type    LG Milton Gamino DO Physician           Default Flowsheet Data (last 720 hours)      Sepsis 100[facility] Name 06/01/21 1429 06/01/21 1340                Repeat Volume Status and Tissue Perfusion Assessment Performed    Repeat Volume Status and Tissue Perfusion Assessment Performed  Yes  -AG  Yes  -LG          Volume Status and Tissue Perfusion Post Fluid Resuscitation * Must Document All *    Vital Signs Reviewed (HR, RR, BP, T)  Yes  -AG  Yes  -LG       Shock Index Reviewed  Yes  -AG  Yes  -LG       Arterial Oxygen Saturation Reviewed (POx, SaO2 or SpO2)  Yes (comment %)  -AG  Yes (comment %)  -LG       Cardio  Normal S1/S2; Regular rate and rhythm  -AG  Normal S1/S2  -LG       Pulmonary  Normal effort;Clear to auscultation  -AG  Normal effort  -LG       Capillary Refill  Brisk  -AG  Brisk  -LG       Peripheral Pulses  Radial;Dorsalis Pedis  -AG  Radial  -LG       Peripheral Pulse  +2  -AG  +2  -LG       Dorsalis Pedis  +2  -AG  --       Skin  Warm;Dry  -AG  Warm;Dry  -LG       Urine output assessed  --  Adequate  -LG          *OR*   Intensive Monitoring- Must Document One of the Following Four *:    Vital Signs Reviewed  --  --       * Central Venous Pressure (CVP or RAP)  --  --       * Central Venous Oxygen (SVO2, ScvO2 or Oxygen saturation via central catheter)  --  --       * Bedside Cardiovascular US in IVC diameter and % collapse  --  --       * Passive Leg Raise OR Crystalloid Challenge  --  --       Crystalloid fluid challenge completed  --  --         User Key  (r) = Recorded By, (t) = Taken By, (c) = Cosigned By    [address] Name Provider Type    LG Stefani Slaughter DO Physician    [facility], PA-C Physician Assistant        SBIRT 22yo+      Most Recent Value   SBIRT (25 yo +)   In order to provide better care to our patients, we are screening all of our patients for alcohol and drug use  Would it be okay to ask you these screening questions? No Filed at: 06/01/2021 1025                    MDM  Number of Diagnoses or Management Options  Anemia:   BRBPR (bright red blood per rectum):   Diverticulitis:   Hepatic steatosis:   Lactic acidosis:   Severe sepsis [facility]):   Diagnosis management comments: Assessment and plan:  68-year-old female presenting with bright red blood per rectum in the setting of acute diverticulitis    Concern for worsening diverticulitis with complications/abscess in addition to lower GI bleed secondary to diverticulosis versus ischemic colitis though abdominal exam is rather benign for ischemic colitis  Will check labs to evaluate for anemia, leukocytosis, kidney and liver function  Will repeat imaging to evaluate for complications of diverticulitis  Will need to discuss with gastroenterology  Disposition  Final diagnoses:   BRBPR (bright red blood per rectum)   Severe sepsis (HCC)   Diverticulitis   Hepatic steatosis   Lactic acidosis   Anemia     Time reflects when diagnosis was documented in both MDM as applicable and the Disposition within this note     Time User Action Codes Description Comment    6/1/2021  1:20 PM Jacquenette Held Add [K62 5] BRBPR (bright red blood per rectum)     6/1/2021  1:20 PM Jacquenette Held Add [A41 9,  R65 20] Severe sepsis ([address] )     6/1/2021  1:20 PM Jacquenette Held Add [K57 92] Diverticulitis     6/1/2021  1:21 PM Jacquenette Held Add [K76 0] Hepatic steatosis     6/1/2021  1:21 PM Jacquenette Held Add [E87 2] Lactic acidosis     6/1/2021  1:21 PM Jacquenette Held Add [D64 9] Anemia       ED Disposition     ED Disposition Condition Date/Time Comment    Admit Stable Tue Jun 1, 2021  4:05 PM Level of Care: Med Surg [16]        Follow-up Information    None         Patient's Medications   Discharge Prescriptions    No medications on file     No discharge procedures on file      PDMP Review       Value Time User    PDMP Reviewed  Yes 5/7/2021 10:55 AM Milan Rey MD          ED Provider  Electronically Signed by           Karthik Moore DO  06/01/21 1766

## 2021-06-01 NOTE — ASSESSMENT & PLAN NOTE
· Blood pressure stable at time of admission  · Maintained on metoprolol, torsemide as an outpatient  · SBP low 100s, will hold metoprolol for now with close monitoring of blood pressures given severe sepsis N/A

## 2021-06-01 NOTE — ASSESSMENT & PLAN NOTE
· Blood pressure stable at time of admission  · Maintained on metoprolol, torsemide as an outpatient  · SBP low 100s, will hold metoprolol for now with close monitoring of blood pressures given severe sepsis

## 2021-06-01 NOTE — ASSESSMENT & PLAN NOTE
· Noted as of 05/24/2021, down trending  · CT abdomen/pelvis - s/p cholecystectomy, hepatic steatosis  · Avoid hepatotoxic agents  · Trend CMP  · Check acute hepatitis panel

## 2021-06-01 NOTE — ASSESSMENT & PLAN NOTE
· Likely in setting of severe sepsis, recent diverticulitis - ?   Consideration for ischemic colitis  · Initial lactic acid 4 7, on repeat has improved to 3 7  · Trend lactic acid Q 2 hours  · Anion gap normal on BMP

## 2021-06-01 NOTE — ASSESSMENT & PLAN NOTE
Wt Readings from Last 3 Encounters:   06/01/21 129 kg (285 lb)   05/13/21 127 kg (281 lb)   05/02/21 129 kg (285 lb 6 4 oz)     · Appears euvolemic on exam  · Maintained on torsemide as an outpatient -originally held in setting of gastrointestinal bleed  · Patient has received 3+ L of IVF in addition to 2 units PRBCs - feels more short of breath this morning with abdominal distension - will start IV Lasix  · Daily weights, I/O, low-sodium diet  · Echocardiogram November 2020 EF 16% grade 2 diastolic dysfunction

## 2021-06-01 NOTE — ASSESSMENT & PLAN NOTE
· Noted as of 05/24/2021, down trending  · CT abdomen/pelvis - s/p cholecystectomy, hepatic steatosis  · Avoid hepatotoxic agents  · Trend CMP  · Acute hepatitis panel negative

## 2021-06-01 NOTE — ASSESSMENT & PLAN NOTE
Lab Results   Component Value Date    HGBA1C 8 4 (A) 10/28/2020       No results for input(s): POCGLU in the last 72 hours      Blood Sugar Average: Last 72 hrs:  · Repeat hemoglobin A1c  · Hold oral agents while inpatient  · SSI plus Accu-Chek   · Diabetic diet

## 2021-06-01 NOTE — ASSESSMENT & PLAN NOTE
· Recently treated for diverticulitis on oral Augmentin to complete later this week  · Began with several episodes of bright red blood per rectum and associated abdominal pain, lactic acidosis  · CT abdomen/pelvis noted with improving inflammation from diverticulitis - ? Given abdominal pain and elevated lactic acidosis consideration for ischemic colitis  · ED attending discussed with General surgery, no surgical interventions at this time  ED attending also discussed with gastroenterology  · Anticoagulated on Eliquis, hold    Patient reports her last dose was 5/31, did not take any of her home medications day of admission 6/1  · Trend H/H q 6 hours and transfuse as needed   · IV Protonix b i d   · Discussed with ONOFRE WANG for clear liquids  · Patient has required 2 units PRBCs total since admission

## 2021-06-01 NOTE — ED NOTES
Pt assisted to stand, pivot to bedside commode  Passed tarry stool   Stood, went back to bed     Mariam Pearson RN  06/01/21 4590

## 2021-06-01 NOTE — ASSESSMENT & PLAN NOTE
· Recently treated for diverticulitis on oral Augmentin, last dose today  · Began with several episodes of bright red blood per rectum and associated abdominal pain, lactic acidosis  · CT abdomen/pelvis noted with improving inflammation from diverticulitis - ? Given abdominal pain and elevated lactic acidosis consideration for ischemic colitis  · ED attending discussed with General surgery, no surgical interventions at this time  ED attending also discussed with gastroenterology  · Anticoagulated on Eliquis, hold  Patient reports her last dose was yesterday, did not take any of her home medications today  · Trend H/H q 6 hours and transfuse as needed - repeat H/H now, ED attending obtained blood consent  · IV Protonix b i d

## 2021-06-01 NOTE — ASSESSMENT & PLAN NOTE
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

## 2021-06-01 NOTE — PLAN OF CARE
Problem: Potential for Falls  Goal: Patient will remain free of falls  Description: INTERVENTIONS:  - Assess patient frequently for physical needs  -  Identify cognitive and physical deficits and behaviors that affect risk of falls    -  [place] fall precautions as indicated by assessment   - Educate patient/family on patient safety including physical limitations  - Instruct patient to call for assistance with activity based on assessment  - Modify environment to reduce risk of injury  - Consider OT/PT consult to assist with strengthening/mobility  Outcome: Progressing     Problem: PAIN - ADULT  Goal: Verbalizes/displays adequate comfort level or baseline comfort level  Description: Interventions:  - Encourage patient to monitor pain and request assistance  - Assess pain using appropriate pain scale  - Administer analgesics based on type and severity of pain and evaluate response  - Implement non-pharmacological measures as appropriate and evaluate response  - Consider cultural and social influences on pain and pain management  - Notify physician/advanced practitioner if interventions unsuccessful or patient reports new pain  Outcome: Progressing     Problem: INFECTION - ADULT  Goal: Absence or prevention of progression during hospitalization  Description: INTERVENTIONS:  - Assess and monitor for signs and symptoms of infection  - Monitor lab/diagnostic results  - Monitor all insertion sites, i e  indwelling lines, tubes, and drains  - Monitor endotracheal if appropriate and nasal secretions for changes in amount and color  - [place] appropriate cooling/warming therapies per order  - Administer medications as ordered  - Instruct and encourage patient and family to use good hand hygiene technique  - Identify and instruct in appropriate isolation precautions for identified infection/condition  Outcome: Progressing  Goal: Absence of fever/infection during neutropenic period  Description: INTERVENTIONS:  - Monitor WBC    Outcome: Progressing     Problem: SAFETY ADULT  Goal: Patient will remain free of falls  Description: INTERVENTIONS:  - Assess patient frequently for physical needs  -  Identify cognitive and physical deficits and behaviors that affect risk of falls    -  [place] fall precautions as indicated by assessment   - Educate patient/family on patient safety including physical limitations  - Instruct patient to call for assistance with activity based on assessment  - Modify environment to reduce risk of injury  - Consider OT/PT consult to assist with strengthening/mobility  Outcome: Progressing  Goal: Maintain or return to baseline ADL function  Description: INTERVENTIONS:  -  Assess patient's ability to carry out ADLs; assess patient's baseline for ADL function and identify physical deficits which impact ability to perform ADLs (bathing, care of mouth/teeth, toileting, grooming, dressing, etc )  - Assess/evaluate cause of self-care deficits   - Assess range of motion  - Assess patient's mobility; develop plan if impaired  - Assess patient's need for assistive devices and provide as appropriate  - Encourage maximum independence but intervene and supervise when necessary  - Involve family in performance of ADLs  - Assess for home care needs following discharge   - Consider OT consult to assist with ADL evaluation and planning for discharge  - Provide patient education as appropriate  Outcome: Progressing  Goal: Maintain or return mobility status to optimal level  Description: INTERVENTIONS:  - Assess patient's baseline mobility status (ambulation, transfers, stairs, etc )    - Identify cognitive and physical deficits and behaviors that affect mobility  - Identify mobility aids required to assist with transfers and/or ambulation (gait belt, sit-to-stand, lift, walker, cane, etc )  - [place] fall precautions as indicated by assessment  - Record patient progress and toleration of activity level on Mobility SBAR; progress patient to next Phase/Stage  - Instruct patient to call for assistance with activity based on assessment  - Consider rehabilitation consult to assist with strengthening/weightbearing, etc   Outcome: Progressing     Problem: DISCHARGE PLANNING  Goal: Discharge to home or other facility with appropriate resources  Description: INTERVENTIONS:  - Identify barriers to discharge w/patient and caregiver  - Arrange for needed discharge resources and transportation as appropriate  - Identify discharge learning needs (meds, wound care, etc )  - Arrange for interpretive services to assist at discharge as needed  - Refer to Case Management Department for coordinating discharge planning if the patient needs post-hospital services based on physician/advanced practitioner order or complex needs related to functional status, cognitive ability, or social support system  Outcome: Progressing     Problem: Knowledge Deficit  Goal: Patient/family/caregiver demonstrates understanding of disease process, treatment plan, medications, and discharge instructions  Description: Complete learning assessment and assess knowledge base    Interventions:  - Provide teaching at level of understanding  - Provide teaching via preferred learning methods  Outcome: Progressing

## 2021-06-01 NOTE — ASSESSMENT & PLAN NOTE
· Patient is chronically on 4 L as an outpatient  · Continue O2 supplementation to maintain SpO2 > 88%

## 2021-06-01 NOTE — ASSESSMENT & PLAN NOTE
· Likely in setting of severe sepsis, recent diverticulitis - ?   Consideration for ischemic colitis  · Initial lactic acid 4 7, on repeat has improved to 3 7  · Trend lactic acid Q 2 hours - originally worsened to 6 3  · Anion gap normal on BMP  · Now resolved s/p IVF

## 2021-06-01 NOTE — ASSESSMENT & PLAN NOTE
· Rate controlled on metoprolol, flecainide  · Anticoagulated on Eliquis - hold in setting of lower gastrointestinal bleed

## 2021-06-01 NOTE — SEPSIS NOTE
Sepsis Note   Heydi Acosta 56 y o  female MRN: [id number]  Unit/Bed#: ED 10 Encounter: [id number]      qSOFA     Row Name 06/01/21 1556 06/01/21 1539 06/01/21 1430 06/01/21 1415 06/01/21 1400    Altered mental status GCS < 15  --  --  --  --  --    Respiratory Rate > / =22  0  0  0  0  0    Systolic BP < / =026  1  0  0  0  0    Q Sofa Score  1  0  0  0  0    Row Name 06/01/21 1330 06/01/21 1315 06/01/21 1200 06/01/21 1145 06/01/21 1115    Altered mental status GCS < 15  --  --  --  --  --    Respiratory Rate > / =22  0  0  0  1  0    Systolic BP < / =712  0  0  0  0  0    Q Sofa Score  0  0  0  1  0    Row Name 06/01/21 0945                Altered mental status GCS < 15  --        Respiratory Rate > / =09  1        Systolic BP < / =640  0        Q Sofa Score  1            Initial Sepsis Screening     Row Name 06/01/21 1206                Is the patient's history suggestive of a new or worsening infection? (!) Yes (Proceed)  -LG        Suspected source of infection  acute abdominal infection  -LG        Are two or more of the following signs & symptoms of infection both present and new to the patient? (!) Yes (Proceed)  -LG        Indicate SIRS criteria  Leukocytosis (WBC > 40598 IJL); Tachycardia > 90 bpm  -LG        If the answer is yes to both questions, suspicion of sepsis is present  --        If severe sepsis is present AND tissue hypoperfusion perists in the hour after fluid resuscitation or lactate > 4, the patient meets criteria for SEPTIC SHOCK  --        Are any of the following organ dysfunction criteria present within 6 hours of suspected infection and SIRS criteria that are NOT considered to be chronic conditions?   (!) Yes  -LG        Organ dysfunction  Lactate > 2 0 mmol/L;Lactate >/equal 4 0 mmol/L  -LG        Date of presentation of severe sepsis  --        Time of presentation of severe sepsis  1130  -LG        Tissue hypoperfusion persists in the hour after crystalloid fluid administration, evidenced, by either:  --        Was hypotension present within one hour of the conclusion of crystalloid fluid administration?  --        Date of presentation of septic shock  --        Time of presentation of septic shock  --          User Key  (r) = Recorded By, (t) = Taken By, (c) = Cosigned By    [address] Name Provider Type    SERJIO CormierDO kale Physician               Default Flowsheet Data (last 720 hours)      Sepsis Reassess     Row Name 06/01/21 1429 06/01/21 1340                Repeat Volume Status and Tissue Perfusion Assessment Performed    Repeat Volume Status and Tissue Perfusion Assessment Performed  Yes  -AG  Yes  -LG          Volume Status and Tissue Perfusion Post Fluid Resuscitation * Must Document All *    Vital Signs Reviewed (HR, RR, BP, T)  Yes  -AG  Yes  -LG       Shock Index Reviewed  Yes  -AG  Yes  -LG       Arterial Oxygen Saturation Reviewed (POx, SaO2 or SpO2)  Yes (comment %)  -AG  Yes (comment %)  -LG       Cardio  Normal S1/S2; Regular rate and rhythm  -AG  Normal S1/S2  -LG       Pulmonary  Normal effort;Clear to auscultation  -AG  Normal effort  -LG       Capillary Refill  Brisk  -AG  Brisk  -LG       Peripheral Pulses  Radial;Dorsalis Pedis  -AG  Radial  -LG       Peripheral Pulse  +2  -AG  +2  -LG       Dorsalis Pedis  +2  -AG  --       Skin  Warm;Dry  -AG  Warm;Dry  -LG       Urine output assessed  --  Adequate  -LG          *OR*   Intensive Monitoring- Must Document One of the Following Four *:    Vital Signs Reviewed  --  --       * Central Venous Pressure (CVP or RAP)  --  --       * Central Venous Oxygen (SVO2, ScvO2 or Oxygen saturation via central catheter)  --  --       * Bedside Cardiovascular US in IVC diameter and % collapse  --  --       * Passive Leg Raise OR Crystalloid Challenge  --  --       Crystalloid fluid challenge completed  --  --         User Key  (r) = Recorded By, (t) = Taken By, (c) = Cosigned By    Initials Name Provider Type    SERJIO Ybarra Shaniqua DO Physician    Diana Mayers PA-C Physician Assistant

## 2021-06-02 DIAGNOSIS — J44.9 CHRONIC OBSTRUCTIVE PULMONARY DISEASE, UNSPECIFIED COPD TYPE (HCC): ICD-10-CM

## 2021-06-02 DIAGNOSIS — E11.65 TYPE 2 DIABETES MELLITUS WITH HYPERGLYCEMIA, WITHOUT LONG-TERM CURRENT USE OF INSULIN (HCC): ICD-10-CM

## 2021-06-02 DIAGNOSIS — Z79.899 ENCOUNTER FOR LONG-TERM (CURRENT) DRUG USE: ICD-10-CM

## 2021-06-02 PROBLEM — E87.20 LACTIC ACIDOSIS: Status: RESOLVED | Noted: 2020-05-22 | Resolved: 2021-06-02

## 2021-06-02 PROBLEM — E87.2 LACTIC ACIDOSIS: Status: RESOLVED | Noted: 2020-05-22 | Resolved: 2021-06-02

## 2021-06-02 LAB
ABO GROUP BLD BPU: NORMAL
ABO GROUP BLD BPU: NORMAL
ATRIAL RATE: 108 BPM
BPU ID: NORMAL
BPU ID: NORMAL
CROSSMATCH: NORMAL
CROSSMATCH: NORMAL
EST. AVERAGE GLUCOSE BLD GHB EST-MCNC: 229 MG/DL
FERRITIN SERPL-MCNC: 16 NG/ML (ref 8–388)
GLUCOSE SERPL-MCNC: 226 MG/DL (ref 65–140)
GLUCOSE SERPL-MCNC: 243 MG/DL (ref 65–140)
GLUCOSE SERPL-MCNC: 278 MG/DL (ref 65–140)
GLUCOSE SERPL-MCNC: 323 MG/DL (ref 65–140)
HAV IGM SER QL: NORMAL
HBA1C MFR BLD: 9.6 %
HBV CORE IGM SER QL: NORMAL
HBV SURFACE AG SER QL: NORMAL
HCV AB SER QL: NORMAL
HGB BLD-MCNC: 6.7 G/DL (ref 11.5–15.4)
HGB BLD-MCNC: 7.7 G/DL (ref 11.5–15.4)
HGB BLD-MCNC: 7.9 G/DL (ref 11.5–15.4)
HGB BLD-MCNC: 8 G/DL (ref 11.5–15.4)
IRON SATN MFR SERPL: 5 %
IRON SERPL-MCNC: 17 UG/DL (ref 50–170)
LACTATE SERPL-SCNC: 1.7 MMOL/L (ref 0.5–2)
LACTATE SERPL-SCNC: 2.5 MMOL/L (ref 0.5–2)
P AXIS: 81 DEGREES
PR INTERVAL: 158 MS
PROCALCITONIN SERPL-MCNC: <0.05 NG/ML
QRS AXIS: 71 DEGREES
QRSD INTERVAL: 66 MS
QT INTERVAL: 332 MS
QTC INTERVAL: 444 MS
T WAVE AXIS: 78 DEGREES
TIBC SERPL-MCNC: 347 UG/DL (ref 250–450)
UNIT DISPENSE STATUS: NORMAL
UNIT DISPENSE STATUS: NORMAL
UNIT PRODUCT CODE: NORMAL
UNIT PRODUCT CODE: NORMAL
UNIT RH: NORMAL
UNIT RH: NORMAL
VENTRICULAR RATE: 108 BPM

## 2021-06-02 PROCEDURE — 83605 ASSAY OF LACTIC ACID: CPT | Performed by: INTERNAL MEDICINE

## 2021-06-02 PROCEDURE — P9016 RBC LEUKOCYTES REDUCED: HCPCS

## 2021-06-02 PROCEDURE — 94660 CPAP INITIATION&MGMT: CPT

## 2021-06-02 PROCEDURE — 99232 SBSQ HOSP IP/OBS MODERATE 35: CPT | Performed by: INTERNAL MEDICINE

## 2021-06-02 PROCEDURE — 94640 AIRWAY INHALATION TREATMENT: CPT

## 2021-06-02 PROCEDURE — 83540 ASSAY OF IRON: CPT | Performed by: INTERNAL MEDICINE

## 2021-06-02 PROCEDURE — 83036 HEMOGLOBIN GLYCOSYLATED A1C: CPT | Performed by: INTERNAL MEDICINE

## 2021-06-02 PROCEDURE — 82728 ASSAY OF FERRITIN: CPT | Performed by: INTERNAL MEDICINE

## 2021-06-02 PROCEDURE — 80074 ACUTE HEPATITIS PANEL: CPT | Performed by: INTERNAL MEDICINE

## 2021-06-02 PROCEDURE — 3046F HEMOGLOBIN A1C LEVEL >9.0%: CPT | Performed by: INTERNAL MEDICINE

## 2021-06-02 PROCEDURE — 93010 ELECTROCARDIOGRAM REPORT: CPT | Performed by: INTERNAL MEDICINE

## 2021-06-02 PROCEDURE — 94760 N-INVAS EAR/PLS OXIMETRY 1: CPT

## 2021-06-02 PROCEDURE — 82948 REAGENT STRIP/BLOOD GLUCOSE: CPT

## 2021-06-02 PROCEDURE — 85018 HEMOGLOBIN: CPT | Performed by: INTERNAL MEDICINE

## 2021-06-02 PROCEDURE — 84145 PROCALCITONIN (PCT): CPT | Performed by: INTERNAL MEDICINE

## 2021-06-02 PROCEDURE — 99222 1ST HOSP IP/OBS MODERATE 55: CPT | Performed by: SURGERY

## 2021-06-02 PROCEDURE — 85018 HEMOGLOBIN: CPT | Performed by: PHYSICIAN ASSISTANT

## 2021-06-02 PROCEDURE — C9113 INJ PANTOPRAZOLE SODIUM, VIA: HCPCS | Performed by: PHYSICIAN ASSISTANT

## 2021-06-02 PROCEDURE — 83550 IRON BINDING TEST: CPT | Performed by: INTERNAL MEDICINE

## 2021-06-02 RX ORDER — SODIUM CHLORIDE, SODIUM GLUCONATE, SODIUM ACETATE, POTASSIUM CHLORIDE, MAGNESIUM CHLORIDE, SODIUM PHOSPHATE, DIBASIC, AND POTASSIUM PHOSPHATE .53; .5; .37; .037; .03; .012; .00082 G/100ML; G/100ML; G/100ML; G/100ML; G/100ML; G/100ML; G/100ML
1000 INJECTION, SOLUTION INTRAVENOUS ONCE
Status: COMPLETED | OUTPATIENT
Start: 2021-06-02 | End: 2021-06-02

## 2021-06-02 RX ORDER — FUROSEMIDE 10 MG/ML
40 INJECTION INTRAMUSCULAR; INTRAVENOUS
Status: DISCONTINUED | OUTPATIENT
Start: 2021-06-02 | End: 2021-06-03

## 2021-06-02 RX ORDER — INSULIN GLARGINE 100 [IU]/ML
10 INJECTION, SOLUTION SUBCUTANEOUS
Status: DISCONTINUED | OUTPATIENT
Start: 2021-06-02 | End: 2021-06-07 | Stop reason: HOSPADM

## 2021-06-02 RX ADMIN — FLUTICASONE PROPIONATE 1 SPRAY: 50 SPRAY, METERED NASAL at 21:05

## 2021-06-02 RX ADMIN — Medication 3.38 G: at 06:09

## 2021-06-02 RX ADMIN — LORATADINE 10 MG: 10 TABLET ORAL at 08:41

## 2021-06-02 RX ADMIN — LEVALBUTEROL HYDROCHLORIDE 1.25 MG: 1.25 SOLUTION, CONCENTRATE RESPIRATORY (INHALATION) at 20:05

## 2021-06-02 RX ADMIN — FUROSEMIDE 40 MG: 10 INJECTION, SOLUTION INTRAVENOUS at 17:10

## 2021-06-02 RX ADMIN — INSULIN LISPRO 5 UNITS: 100 INJECTION, SOLUTION INTRAVENOUS; SUBCUTANEOUS at 11:40

## 2021-06-02 RX ADMIN — FUROSEMIDE 40 MG: 10 INJECTION, SOLUTION INTRAVENOUS at 10:11

## 2021-06-02 RX ADMIN — LORAZEPAM 1 MG: 1 TABLET ORAL at 21:05

## 2021-06-02 RX ADMIN — INSULIN LISPRO 3 UNITS: 100 INJECTION, SOLUTION INTRAVENOUS; SUBCUTANEOUS at 22:02

## 2021-06-02 RX ADMIN — INSULIN LISPRO 4 UNITS: 100 INJECTION, SOLUTION INTRAVENOUS; SUBCUTANEOUS at 08:39

## 2021-06-02 RX ADMIN — Medication 3.38 G: at 00:13

## 2021-06-02 RX ADMIN — ATORVASTATIN CALCIUM 40 MG: 40 TABLET, FILM COATED ORAL at 17:11

## 2021-06-02 RX ADMIN — INSULIN LISPRO 5 UNITS: 100 INJECTION, SOLUTION INTRAVENOUS; SUBCUTANEOUS at 11:41

## 2021-06-02 RX ADMIN — Medication 3.38 G: at 11:41

## 2021-06-02 RX ADMIN — FLUTICASONE PROPIONATE 1 SPRAY: 50 SPRAY, METERED NASAL at 10:11

## 2021-06-02 RX ADMIN — INSULIN LISPRO 2 UNITS: 100 INJECTION, SOLUTION INTRAVENOUS; SUBCUTANEOUS at 17:11

## 2021-06-02 RX ADMIN — FLUTICASONE PROPIONATE 2 PUFF: 220 AEROSOL, METERED RESPIRATORY (INHALATION) at 08:39

## 2021-06-02 RX ADMIN — PANTOPRAZOLE SODIUM 40 MG: 40 INJECTION, POWDER, FOR SOLUTION INTRAVENOUS at 13:17

## 2021-06-02 RX ADMIN — SODIUM CHLORIDE, SODIUM GLUCONATE, SODIUM ACETATE, POTASSIUM CHLORIDE, MAGNESIUM CHLORIDE, SODIUM PHOSPHATE, DIBASIC, AND POTASSIUM PHOSPHATE 1000 ML: .53; .5; .37; .037; .03; .012; .00082 INJECTION, SOLUTION INTRAVENOUS at 02:01

## 2021-06-02 RX ADMIN — ACETAMINOPHEN 650 MG: 325 TABLET, FILM COATED ORAL at 21:04

## 2021-06-02 RX ADMIN — FLUTICASONE PROPIONATE 2 PUFF: 220 AEROSOL, METERED RESPIRATORY (INHALATION) at 17:11

## 2021-06-02 RX ADMIN — INSULIN GLARGINE 10 UNITS: 100 INJECTION, SOLUTION SUBCUTANEOUS at 22:02

## 2021-06-02 RX ADMIN — FLECAINIDE ACETATE 100 MG: 100 TABLET ORAL at 08:41

## 2021-06-02 RX ADMIN — TRAZODONE HYDROCHLORIDE 150 MG: 150 TABLET ORAL at 22:02

## 2021-06-02 RX ADMIN — CITALOPRAM HYDROBROMIDE 20 MG: 20 TABLET ORAL at 08:41

## 2021-06-02 RX ADMIN — Medication 3.38 G: at 17:10

## 2021-06-02 RX ADMIN — FLECAINIDE ACETATE 100 MG: 100 TABLET ORAL at 21:05

## 2021-06-02 RX ADMIN — LEVALBUTEROL HYDROCHLORIDE 1.25 MG: 1.25 SOLUTION, CONCENTRATE RESPIRATORY (INHALATION) at 08:21

## 2021-06-02 RX ADMIN — MONTELUKAST SODIUM 10 MG: 10 TABLET, FILM COATED ORAL at 21:05

## 2021-06-02 RX ADMIN — INSULIN LISPRO 5 UNITS: 100 INJECTION, SOLUTION INTRAVENOUS; SUBCUTANEOUS at 17:13

## 2021-06-02 NOTE — PROGRESS NOTES
[place]  Progress Note - Kofi Roche 1960, 56 y o  female MRN: [id number]  Unit/Bed#: -01 Encounter: [id number]  Primary Care Provider: Santino Guillen MD   Date and time admitted to hospital: 6/1/2021  9:40 AM    * Severe sepsis [facility])  Assessment & Plan  · Presented to the emergency department with bright red blood per rectum, abdominal pain  Currently being treated for diverticulitis as an outpatient on oral Augmentin, last dose today  · Patient met severe sepsis criteria with tachycardia, leukocytosis and lactic acidosis possibly in setting of ? Ischemic colitis, also concern for possible sinus tract between the ovary and sigmoid colon however no obvious abscess  · Received 30 cc/kg in the ED of IVF with improvement in lactic acidosis, repeat pending in 2 hours  · Vital signs stable at time of admission  · ED attending discussed with General surgery - no surgical interventions warranted at this time  Recommended continuing IV abx  Also discussed with gastroenterology  · Will check urinalysis, CXR  · Follow-up blood cultures x2  · Started on IV Zosyn in the ED  · Trend WBC and fever curve    Lower gastrointestinal bleeding  Assessment & Plan  · Recently treated for diverticulitis on oral Augmentin to complete later this week  · Began with several episodes of bright red blood per rectum and associated abdominal pain, lactic acidosis  · CT abdomen/pelvis noted with improving inflammation from diverticulitis - ? Given abdominal pain and elevated lactic acidosis consideration for ischemic colitis  · ED attending discussed with General surgery, no surgical interventions at this time  ED attending also discussed with gastroenterology  · Anticoagulated on Eliquis, hold    Patient reports her last dose was 5/31, did not take any of her home medications day of admission 6/1  · Trend H/H q 6 hours and transfuse as needed   · IV Protonix b i d   · Discussed with KATHLEEN OK for clear liquids  · Patient has required 2 units PRBCs total since admission    Acute blood loss anemia  Assessment & Plan  · Does have chronic anemia, hemoglobin baseline appears to be around 9-10  · Hemoglobin 8 5 on admission likely in setting of lower gastrointestinal bleed  · Trend H/H q 6 hours and transfuse as needed - patient required 2 units PRBCs total since admission  · Hold Eliquis  · Hemoglobin 8 0 this morning  · No further episodes of bleeding per nursing    Chronic respiratory failure with hypoxia [facility])  Assessment & Plan  · Patient is chronically on 4 L as an outpatient  · Continue O2 supplementation to maintain SpO2 > 88%    Transaminitis  Assessment & Plan  · Noted as of 05/24/2021, down trending  · CT abdomen/pelvis - s/p cholecystectomy, hepatic steatosis  · Avoid hepatotoxic agents  · Trend CMP  · Acute hepatitis panel negative    Chronic diastolic congestive heart failure ([facility] [address] )  Assessment & Plan  Wt Readings from Last 3 Encounters:   06/01/21 129 kg (285 lb)   05/13/21 127 kg (281 lb)   05/02/21 129 kg (285 lb 6 4 oz)     · Appears euvolemic on exam  · Maintained on torsemide as an outpatient -originally held in setting of gastrointestinal bleed  · Patient has received 3+ L of IVF in addition to 2 units PRBCs - feels more short of breath this morning with abdominal distension - will start IV Lasix  · Daily weights, I/O, low-sodium diet  · Echocardiogram November 2020 EF 90% grade 2 diastolic dysfunction    Atrial fibrillation (HCC)  Assessment & Plan  · Rate controlled on metoprolol, flecainide  · Anticoagulated on Eliquis - hold in setting of lower gastrointestinal bleed    Type 2 diabetes mellitus, without long-term current use of insulin (HCC)  Assessment & Plan  Lab Results   Component Value Date    HGBA1C 8 4 (A) 10/28/2020       No results for input(s): POCGLU in the last 72 hours      Blood Sugar Average: Last 72 hrs:  · Repeat hemoglobin A1c  · Hold oral agents while inpatient  · SSI plus Accu-Chek   · Diabetic diet    Esophageal reflux  Assessment & Plan  · Continue PPI    Benign essential hypertension  Assessment & Plan  · Blood pressure stable at time of admission  · Maintained on metoprolol, torsemide as an outpatient  · SBP low 100s, will hold metoprolol for now with close monitoring of blood pressures given severe sepsis    Lactic acidosis-resolved as of 2021  Assessment & Plan  · Likely in setting of severe sepsis, recent diverticulitis - ? Consideration for ischemic colitis  · Initial lactic acid 4 7, on repeat has improved to 3 7  · Trend lactic acid Q 2 hours - originally worsened to 6 3  · Anion gap normal on BMP  · Now resolved s/p IVF    VTE Pharmacologic Prophylaxis:   Pharmacologic: Pharmacologic VTE Prophylaxis contraindicated due to Gastrointestinal bleed  Mechanical VTE Prophylaxis in Place: Yes    Patient Centered Rounds: I have performed bedside rounds with nursing staff today  Discussions with Specialists or Other Care Team Provider:  Nursing, CM, GI AP    Education and Discussions with Family / Patient:  Discussed with patient directly at bedside  Declined family update  Time Spent for Care: 20 minutes  More than 50% of total time spent on counseling and coordination of care as described above  Current Length of Stay: 1 day(s)    Current Patient Status: Inpatient   Certification Statement: The patient will continue to require additional inpatient hospital stay due to Acute blood loss anemia, severe sepsis    Discharge Plan:  Pending clinical course    Code Status: Level 1 - Full Code      Subjective:   Patient feels somewhat better today  More with nausea than abdominal pain  Denies chest pain/palpitations but does feel slightly more short of breath with worsening abdominal distension which patient feels happens when she is retaining fluid  No further bleeding since admission      Objective:     Vitals:   Temp (24hrs), Av 3 °F (36 8 °C), Min:97 7 °F (36 5 °C), Max:98 7 °F (37 1 °C)    Temp:  [97 7 °F (36 5 °C)-98 7 °F (37 1 °C)] 98 6 °F (37 °C)  HR:  [] 81  Resp:  [15-22] 16  BP: (100-128)/(48-68) 115/68  SpO2:  [94 %-100 %] 97 %  Body mass index is 51 97 kg/m²  Input and Output Summary (last 24 hours): Intake/Output Summary (Last 24 hours) at 6/2/2021 1022  Last data filed at 6/2/2021 1001  Gross per 24 hour   Intake 2450 ml   Output 2800 ml   Net -350 ml       Physical Exam:     Physical Exam  Vitals signs and nursing note reviewed  Constitutional:       Appearance: Normal appearance  Interventions: Nasal cannula in place  Comments: Appears comfortable   HENT:      Head: Normocephalic  Eyes:      General: No scleral icterus  Extraocular Movements: Extraocular movements intact  Conjunctiva/sclera: Conjunctivae normal    Neck:      Musculoskeletal: Normal range of motion  Cardiovascular:      Rate and Rhythm: Normal rate and regular rhythm  Heart sounds: S1 normal and S2 normal    Pulmonary:      Effort: Pulmonary effort is normal       Breath sounds: Decreased breath sounds present  No wheezing, rhonchi or rales  Comments: Diminished breath sounds bilaterally  Abdominal:      General: Bowel sounds are normal  There is distension  Palpations: Abdomen is soft  Tenderness: There is abdominal tenderness in the right lower quadrant, suprapubic area and left lower quadrant  There is no guarding or rebound  Comments: Mild tenderness to palpation   Musculoskeletal:         General: No swelling, tenderness or deformity  Comments: Able to move upper/lower extremities bilaterally without difficulty, pedal edema   Skin:     General: Skin is warm and dry  Comments: Color improved since admission, less pale   Neurological:      Mental Status: She is alert and oriented to person, place, and time     Psychiatric:         Mood and Affect: Mood normal          Speech: Speech normal          Behavior: Behavior normal            Additional Data:     Labs:    Results from last 7 days   Lab Units 06/02/21  0759  06/01/21  1421 06/01/21  1006   WBC Thousand/uL  --   --   --  15 85*   HEMOGLOBIN g/dL 8 0*   < > 7 2* 8 5*   HEMATOCRIT %  --   --  24 4* 28 7*   PLATELETS Thousands/uL  --   --   --  288   NEUTROS PCT %  --   --   --  73   LYMPHS PCT %  --   --   --  17   MONOS PCT %  --   --   --  7   EOS PCT %  --   --   --  2    < > = values in this interval not displayed  Results from last 7 days   Lab Units 06/01/21  1006   SODIUM mmol/L 136   POTASSIUM mmol/L 4 2   CHLORIDE mmol/L 93*   CO2 mmol/L 33*   BUN mg/dL 10   CREATININE mg/dL 0 76   ANION GAP mmol/L 10   CALCIUM mg/dL 8 6   ALBUMIN g/dL 3 0*   TOTAL BILIRUBIN mg/dL 0 30   ALK PHOS U/L 176*   ALT U/L 91*   AST U/L 51*   GLUCOSE RANDOM mg/dL 316*     Results from last 7 days   Lab Units 06/01/21  1006   INR  1 15     Results from last 7 days   Lab Units 06/02/21  0710 06/01/21  2139 06/01/21  1626   POC GLUCOSE mg/dl 278* 297* 299*     Results from last 7 days   Lab Units 06/02/21  0329   HEMOGLOBIN A1C % 9 6*     Results from last 7 days   Lab Units 06/02/21  0336 06/02/21  0329 06/02/21  0027 06/01/21  2153 06/01/21  1821  06/01/21  1103   LACTIC ACID mmol/L  --  1 7 2 5* 4 7* 6 3*   < >  --    PROCALCITONIN ng/ml <0 05  --   --   --   --   --  <0 05    < > = values in this interval not displayed  * I Have Reviewed All Lab Data Listed Above  * Additional Pertinent Lab Tests Reviewed: All Labs Within Last 24 Hours Reviewed    Imaging:    Imaging Reports Reviewed Today Include:   Imaging Personally Reviewed by Myself Includes:      Recent Cultures (last 7 days):     Results from last 7 days   Lab Units 06/01/21  1103   BLOOD CULTURE  Received in Microbiology Lab  Culture in Progress  Received in Microbiology Lab  Culture in Progress         Last 24 Hours Medication List:   Current Facility-Administered Medications   Medication Dose Route Frequency Provider Last Rate    acetaminophen  650 mg Oral Q6H PRN Ousmane Harper PA-C      albuterol  2 puff Inhalation Q4H PRN Leticia Santoyo PA-C      atorvastatin  40 mg Oral Daily With [employer] Mercedes PA-C      citalopram  20 mg Oral Daily Shahbaz Lawrence [place], [place]      flecainide  100 mg Oral Q12H [address] Shahbaz Lawrence [place], [place]      fluticasone  1 spray Nasal BID Shahbaz Iglesias PA-C      fluticasone  2 puff Inhalation BID Shahbaz Iglesias PA-C      furosemide  40 mg Intravenous BID (diuretic) Leticia Santoyo PA-C      insulin lispro  1-6 Units Subcutaneous TID AC Shahbaz Lawrence [place], [place]      insulin lispro  1-6 Units Subcutaneous HS Shahbaz Lawrence [place], [place]      levalbuterol  1 25 mg Nebulization BID Shahbaz FryeityROSALIO      loratadine  10 mg Oral Daily Shahbaz Lawrence [place], [place]      LORazepam  1 mg Oral HS Ousmane Harper PA-C      montelukast  10 mg Oral HS Shahbaz FryeityROSALIO      pantoprazole  40 mg Intravenous Q12H [address] [place], [place]      piperacillin-tazobactam  3 375 g Intravenous Q6H Shahbaz Lawrence [place], [place]      traZODone  150 mg Oral HS Leticia Santoyo PA-C          Today, Patient Was Seen By: Leticia Santoyo PA-C    ** Please Note: Dictation voice to text software may have been used in the creation of this document   **

## 2021-06-02 NOTE — UTILIZATION REVIEW
Inpatient Admission Authorization Request   NOTIFICATION OF INPATIENT ADMISSION/INPATIENT AUTHORIZATION REQUEST   SERVICING FACILITY:   [place] [address]  Tax ID: [id number]  NPI: [id number]  Place of Service: Inpatient [address] [facility]  [address]  Place of Service Code: 24     ATTENDING PROVIDER:  Attending Name and NPI#: [place] Amadou [[id number]]  Address: [address] [place] [address]  Phone: [phone]     UTILIZATION REVIEW CONTACT:  Minesh Reardon, Utilization   Network Utilization Review Department  Phone: [phone]  Fax [phone]  Email: [email or web address]     PHYSICIAN ADVISORY SERVICES:  FOR KIBK-ZD-IKIJ REVIEW - MEDICAL NECESSITY DENIAL  Phone: [phone]  Fax: [phone]  Email: [email or web address]     TYPE OF REQUEST:  Inpatient Status     ADMISSION INFORMATION:  ADMISSION DATE/TIME: 6/1/21 1338  PATIENT DIAGNOSIS CODE/DESCRIPTION:  Lactic acidosis [E87 2]  Diverticulitis [K57 92]  Anemia [D64 9]  Hepatic steatosis [K76 0]  BRBPR (bright red blood per rectum) [K62 5]  Rectal bleed [K62 5]  Severe sepsis ([facility] [address] ) [A41 9, R65 20]  DISCHARGE DATE/TIME: No discharge date for patient encounter  DISCHARGE DISPOSITION (IF DISCHARGED): Home/Self Care     IMPORTANT INFORMATION:  Please contact the [facility] directly with any questions or concerns regarding this request  Department voicemails are confidential     Send requests for admission clinical reviews, concurrent reviews, approvals, and administrative denials due to lack of clinical to fax [phone]

## 2021-06-02 NOTE — UTILIZATION REVIEW
Initial Clinical Review    Admission: Date/Time/Statement:   Admission Orders (From admission, onward)     Ordered        06/01/21 1338  Inpatient Admission  Once                   Orders Placed This Encounter   Procedures    Inpatient Admission     Standing Status:   Standing     Number of Occurrences:   1     Order Specific Question:   Level of Care     Answer:   Level 2 Stepdown / HOT [14]     Order Specific Question:   Estimated length of stay     Answer:   More than 2 Midnights     Order Specific Question:   Certification     Answer:   I certify that inpatient services are medically necessary for this patient for a duration of greater than two midnights  See H&P and MD Progress Notes for additional information about the patient's course of treatment  ED Arrival Information     Expected Arrival Acuity Means of Arrival Escorted By Service Admission Type    - 6/1/2021 09:38 Emergent Wheelchair Family Member General Medicine Emergency    Arrival Complaint    rectal bleed        Chief Complaint   Patient presents with    Rectal Bleeding     patient reports at least 8 episodes of bright red blood rectal bleeding since last night  reports low abdominal pain near suprapubic area  denies n/v admits to increased weakness       Initial Presentation:  this is a 56year old female from home to ED admitted inpatient due to sepsis/lower GI bleed/acute blood loss anemia  On home oxygen 4 liters  On Eliquis for atrial fib  Diagnosed with diverticulitis 5/24/2021 on augmentin  Presented with to abdominal pain and 8 episodes of bright red blood per rectum starting night prior to arrival   On exam obese  Tachycardic  Abdominal tenderness suprapubic area and LLQ  Rectal guaiac +  External hemorrhoid  Stool tarry  Bilateral LE edema  Lactic acid 4 7  H&H 8 5/28 7  Baseline hgb 9 to 10  Wbc 15 85  Glucose 316  AST 51  ALT 91  In the ED,  Received 30 cc/kg in the ED of IVF, started on Zosyn    Plan is continued antibiotics, trend H&H and transfuse as needed, start IV Protonix, clears  Hold Eliquis  Consult surgery  And GI      6/1/2021 per GI:  Differential diagnosis is ischemic colitis vs segmental colitis vs diverticular bleed  Recommend  Clears, IV antibiotics, hold Eliquis, transfuse as needed  Date: 6/2/2021   Day 2: Patient has nausea,  And abdominal pain  Feels more short of breath and worsening abdominal distention  No further bleeding  On exam decreased breath sounds  Abdominal distention and tenderness in RLQ, suprapubic and LLQ  Less pale  To continue antibiotics, trend H&H, IV Protonix, continue to trend H&H and has received 2 units PRBC thus far  Start IV Lasix  6/2/2021 per Surgery-  Patient with rectal bleeding and differential diagnosis is ischemic colitis, segmental colitis associated with diverticular disease (SCAD) or diverticular bleed  Recommend to hold Eliquis, if rectal bleeding recurs and patient continues to have drops in Hgb, would recommend colonoscopy, no surgical interventions        ED Triage Vitals   Temperature Pulse Respirations Blood Pressure SpO2   06/01/21 0943 06/01/21 0945 06/01/21 0945 06/01/21 0945 06/01/21 0945   (!) 97 3 °F (36 3 °C) (!) 112 22 138/60 98 %      Temp Source Heart Rate Source Patient Position - Orthostatic VS BP Location FiO2 (%)   06/01/21 1315 06/01/21 1115 06/01/21 1145 06/01/21 1115 --   Oral Monitor Sitting Right arm       Pain Score       06/01/21 0943       6          Wt Readings from Last 1 Encounters:   06/02/21 133 kg (293 lb 6 4 oz)     Additional Vital Signs:  06/02/21 0825  --  --  --  --  --  --  36  4 L/min  Nasal cannula  --  --   06/02/21 07:11:36  98 6 °F (37 °C)  81  16  106/48Abnormal   67  97 %  --  --  --  --  --   06/02/21 0615  98 3 °F (36 8 °C)  96  18  113/57  --  --  --  --  --  --  --   06/02/21 02:54:09  98 4 °F (36 9 °C)  86  18  105/53  70  98 %  --  --  --  --  --   06/02/21 0246  --  --  --  --  --  97 % --  --  --  Face mask  --   06/02/21 0236  98 7 °F (37 1 °C)  95  18  116/64  --  --  --  --  --  --  --   06/01/21 19:34:02  97 7 °F (36 5 °C)  94  20  122/61  81  95 %  --  --  --  --  --   06/01/21 1556  98 °F (36 7 °C)  100  20  100/61  --  97 %  36  4 L/min  --  --  --   06/01/21 1539  97 7 °F (36 5 °C)  97  18  107/51  --  --  --  --  --  --  --   06/01/21 1315  98 7 °F (37 1 °C)  99  17  109/53  --  99 %  36  4 L/min  Nasal cannula  --  --   06/01/21 1200  --  92  18  110/51  73  99 %  36  4 L/min  Nasal cannula  --  Lying   06/01/21 1115  --  101  17  122/58  83  98 %  36  4 L/min  Nasal cannula  --  --   06/01/21 0945  --  112Abnormal   22  138/60  --  98 %  --  --  --           Pertinent Labs/Diagnostic Test Results:   6/1/2021 ct abdomen - Interval improvement in inflammatory changes surrounding the previously inflamed loop of sigmoid colon, with mild residual wall thickening  2   Linear soft tissue density connecting this loop of sigmoid colon with the left ovary, may represent sinus tract  Since patient is status post hysterectomy, if present, a sinus tract would present with abscess formation as opposed to vaginal   discharge  Currently, no abscess present  If patient develops constitutional symptoms following completion of diverticulitis treatment, consider follow-up CT abdomen and pelvis to evaluate for abscess  3   Hepatic steatosis    6/1/2021 CxR - No acute cardiopulmonary disease  6/1/2021 ECG - sinus tachycardia    Normal ST and T  Results from last 7 days   Lab Units 06/02/21  0759 06/02/21  0027 06/01/21  1821 06/01/21  1421 06/01/21  1006   WBC Thousand/uL  --   --   --   --  15 85*   HEMOGLOBIN g/dL 8 0* 6 7* 8 2* 7 2* 8 5*   HEMATOCRIT %  --   --   --  24 4* 28 7*   PLATELETS Thousands/uL  --   --   --   --  288   NEUTROS ABS Thousands/µL  --   --   --   --  11 42*     Results from last 7 days   Lab Units 06/01/21  1006   SODIUM mmol/L 136   POTASSIUM mmol/L 4 2   CHLORIDE mmol/L 93* CO2 mmol/L 33*   ANION GAP mmol/L 10   BUN mg/dL 10   CREATININE mg/dL 0 76   EGFR ml/min/1 73sq m 84   CALCIUM mg/dL 8 6     Results from last 7 days   Lab Units 06/01/21  1006   AST U/L 51*   ALT U/L 91*   ALK PHOS U/L 176*   TOTAL PROTEIN g/dL 6 3*   ALBUMIN g/dL 3 0*   TOTAL BILIRUBIN mg/dL 0 30     Results from last 7 days   Lab Units 06/02/21  1107 06/02/21  0710 06/01/21  2139 06/01/21  1626   POC GLUCOSE mg/dl 323* 278* 297* 299*     Results from last 7 days   Lab Units 06/01/21  1006   GLUCOSE RANDOM mg/dL 316*     Results from last 7 days   Lab Units 06/02/21  0329   HEMOGLOBIN A1C % 9 6*   EAG mg/dl 229     Results from last 7 days   Lab Units 06/01/21  1006   PROTIME seconds 14 7*   INR  1 15   PTT seconds 28     Results from last 7 days   Lab Units 06/02/21  0336 06/01/21  1103   PROCALCITONIN ng/ml <0 05 <0 05     Results from last 7 days   Lab Units 06/02/21  0329 06/02/21  0027 06/01/21  2153 06/01/21  1821 06/01/21  1432 06/01/21  1227 06/01/21  1006   LACTIC ACID mmol/L 1 7 2 5* 4 7* 6 3* 3 2* 3 7* 4 7*     Results from last 7 days   Lab Units 06/02/21  0329   FERRITIN ng/mL 16     Results from last 7 days   Lab Units 06/02/21  0329   HEP B S AG  Non-reactive   HEP C AB  Non-reactive   HEP B C IGM  Non-reactive     Results from last 7 days   Lab Units 06/01/21  1006   LIPASE u/L 72*     Results from last 7 days   Lab Units 06/01/21  1522   CLARITY UA  Clear   COLOR UA  Yellow   SPEC GRAV UA  <=1 005   PH UA  6 5   GLUCOSE UA mg/dl 250 (1/4%)*   KETONES UA mg/dl Negative   BLOOD UA  Negative   PROTEIN UA mg/dl Negative   NITRITE UA  Negative   BILIRUBIN UA  Negative   UROBILINOGEN UA E U /dl 0 2   LEUKOCYTES UA  Negative     Results from last 7 days   Lab Units 06/01/21  1103   BLOOD CULTURE  Received in Microbiology Lab  Culture in Progress  Received in Microbiology Lab  Culture in Progress       ED Treatment:   Medication Administration from 06/01/2021 0938 to 06/01/2021 1916       Date/Time Order Dose Route Action Comments     06/01/2021 1003 sodium chloride 0 9 % bolus 500 mL 500 mL Intravenous New Bag      06/01/2021 1105 piperacillin-tazobactam (ZOSYN) IVPB 4 5 g 4 5 g Intravenous New Bag      06/01/2021 1133 multi-electrolyte (PLASMALYTE-A/ISOLYTE-S PH 7 4) IV solution 1,000 mL 1,000 mL Intravenous New Bag      06/01/2021 1222 multi-electrolyte (PLASMALYTE-A/ISOLYTE-S PH 7 4) IV solution 1,000 mL 1,000 mL Intravenous New Bag      06/01/2021 1410 ondansetron (ZOFRAN) injection 4 mg 4 mg Intravenous Given      06/01/2021 1410 pantoprazole (PROTONIX) injection 40 mg 40 mg Intravenous Given      06/01/2021 1857 piperacillin-tazobactam (ZOSYN) IVPB 3 375 g 3 375 g Intravenous New Bag      06/01/2021 1745 atorvastatin (LIPITOR) tablet 40 mg 40 mg Oral Given      06/01/2021 1747 insulin lispro (HumaLOG) 100 units/mL subcutaneous injection 1-6 Units 4 Units Subcutaneous Given         Past Medical History:   Diagnosis Date    Alcohol abuse 5/21/2020    Alcohol abuse 5/21/2020    Anemia     Atrial fibrillation (HCC)     Cervical radiculopathy     CHF (congestive heart failure) (HCC)     Chronic low back pain     [facility] 2/20/2018    Community acquired pneumonia     Community acquired pneumonia 5/21/2020    Depression with anxiety 3/9/2014    Diabetes mellitus ([facility] )     Diabetes mellitus with hyperglycemia (HCC)     Elevated liver enzymes     GERD (gastroesophageal reflux disease)     Hypersomnolence 10/28/2020    Hypokalemia 12/4/2018    Paresthesia of upper extremity     Plantar fasciitis     Restless leg     Restless legs syndrome 4/8/2014    Sexual dysfunction     Sleep apnea, obstructive     Tenosynovitis of finger     Tinea corporis     Tobacco use 2/20/2018    Currently smoking 3-4 cigarettes daily    Trigger middle finger of left hand 12/14/2017    Trigger ring finger of left hand 12/14/2017    Weakness of upper extremity      Present on Admission:   Chronic respiratory failure with hypoxia (HCC)   Esophageal reflux   Benign essential hypertension   Chronic diastolic congestive heart failure (HCC)   (Resolved) Lactic acidosis   Lower gastrointestinal bleeding   Atrial fibrillation (HCC)   Transaminitis   Acute blood loss anemia   Type 2 diabetes mellitus, without long-term current use of insulin (HCC)      Admitting Diagnosis: Lactic acidosis [E87 2]  Diverticulitis [K57 92]  Anemia [D64 9]  Hepatic steatosis [K76 0]  BRBPR (bright red blood per rectum) [K62 5]  Rectal bleed [K62 5]  Severe sepsis (HCC) [A41 9, R65 20]  Age/Sex: 56 y o  female  Admission Orders: 6/1/2021 1338 inpatient   Scheduled Medications:  atorvastatin, 40 mg, Oral, Daily With Dinner  citalopram, 20 mg, Oral, Daily  flecainide, 100 mg, Oral, Q12H LISETTE  fluticasone, 1 spray, Nasal, BID  fluticasone, 2 puff, Inhalation, BID  furosemide, 40 mg, Intravenous, BID (diuretic)  insulin glargine, 10 Units, Subcutaneous, HS  insulin lispro, 1-6 Units, Subcutaneous, TID AC  insulin lispro, 1-6 Units, Subcutaneous, HS  insulin lispro, 5 Units, Subcutaneous, TID With Meals  levalbuterol, 1 25 mg, Nebulization, BID  loratadine, 10 mg, Oral, Daily  LORazepam, 1 mg, Oral, HS  montelukast, 10 mg, Oral, HS  pantoprazole, 40 mg, Intravenous, Q12H LISETTE  piperacillin-tazobactam, 3 375 g, Intravenous, Q6H  traZODone, 150 mg, Oral, HS      Continuous IV Infusions: none      PRN Meds:  acetaminophen, 650 mg, Oral, Q6H PRN - used x 1   albuterol, 2 puff, Inhalation, Q4H PRN    6/1 and 6/2//2021 transfuse leukoreduced RBC  cpap at bedtime     IP CONSULT TO ACUTE CARE SURGERY  IP CONSULT TO GASTROENTEROLOGY      Network Utilization Review Department  ATTENTION: Please call with any questions or concerns to [phone] and carefully listen to the prompts so that you are directed to the right person   All voicemails are confidential   Valeriano Aguirre all requests for admission clinical reviews, approved or denied determinations and any other requests to dedicated fax number below belonging to the campus where the patient is receiving treatment   List of dedicated fax numbers for the Facilities:  [address] [place] [address] DENIALS (Administrative/Medical Necessity) [phone]   [address] (Maternity/NICU/Pediatrics) [phone] [address] [place] [address] Dr Andrey Kaurel Santosh [id number] [place] [address] Amadou Shaun Loo 14[address] [place] [address] [phone]

## 2021-06-02 NOTE — CONSULTS
Consultation - General Surgery   Jennie Masters 56 y o  female MRN: [id number]  Unit/Bed#: -01 Encounter: [id number]               Assessment/Plan     Rectal Bleeding  -Ct scan shows thickening and inflammation of the sigmoid colon  -differential diagnosis includes ischemic colitis, segmental colitis associated with diverticular disease (SCAD) or diverticular bleed, GI following  -patient with no further episodes of rectal bleeding overnight  -continue to hold Eliquis  -s/p transfusion yesterday,continue to trend the Hgb and transfuse as needed   -if rectal bleeding recurs and patient continues to have drops in Hgb, would recommend colonoscopy  -no plans for surgical intervention,  will remain on standby if patient's condition deteriorates and bleeding unable to be stabilized    Diverticulitis  -pt has recent hx of diverticulitis and was currently being treated with oral ABx prior to admit  -continue Zoysn  -advance to low residue diet when able    Sepsis, POA  -Presented to the ED with tachycardia, leukocytosis, lactic acidosis  Pt being treated for diverticulitis and BRBPR  -Cont to f/u on blood Cx   -Trend WBC and fever curve    Chronic resp failure  -Chronic 4L at home  -Cont to maintain >88%  -SLIM following    Transaminitis  -Trend CMP  -Avoid hepatotoxic agents  -SLIM following  CHF, DM2, HTN  -SLIM following, plan as per primary    History of Present Illness    Admit Date:  6/1/2021  Hospital Day:  1 day  Primary Service:  General Medicine    HPI: Jennie Masters is a 56y o  year old female who presents with lower GI bleeding  She is on Eliquis for A FIB  Patient has pmhx significant for  COPD, Chronic resp failure, Diabetes type 2, morbid obesity, GERD and alcoholic abuse  She came to the ER for rectal bleeding on 6/1  She reports it started the night before and she has some low abdominal pain near the suprapubic area  No nausea or vomiting   She reports she has not had any more rectal bleeding since admission  She has hx of Diverticulitis and was on Augmentin, her last day was supposed to be on Thursday  Today, she still complains some tenderness over the same region  She feels tired, but has been eating well since admission  Inpatient consult to Acute Care Surgery  Consult performed by: Betina Gomez PA-C  Consult ordered by: Azul Spears PA-C          Review of Systems       CONSTITUTIONAL: Denies any fever, chills, rigors, and weight loss  Positive for fatigue  HEENT: No earache or tinnitus  Denies hearing loss or visual disturbances  CARDIOVASCULAR: No chest pain or palpitations  RESPIRATORY: Denies any cough, hemoptysis, shortness of breath or dyspnea on exertion  GASTROINTESTINAL: As noted in the History of Present Illness  GENITOURINARY: No problems with urination  Denies any hematuria or dysuria  NEUROLOGIC: No dizziness or vertigo, denies headaches  MUSCULOSKELETAL: Denies any muscle or joint pain  SKIN: Denies skin rashes or itching  ENDOCRINE: Denies excessive thirst  Denies intolerance to heat or cold  PSYCHOSOCIAL: Denies depression or anxiety   Denies any recent memory loss         Historical Information   Past Medical History:   Diagnosis Date    Alcohol abuse 5/21/2020    Alcohol abuse 5/21/2020    Anemia     Atrial fibrillation (HCC)     Cervical radiculopathy     CHF (congestive heart failure) (HCC)     Chronic low back pain     [facility] ) 2/20/2018    Community acquired pneumonia     Community acquired pneumonia 5/21/2020    Depression with anxiety 3/9/2014    Diabetes mellitus ([facility] )     Diabetes mellitus with hyperglycemia (HCC)     Elevated liver enzymes     GERD (gastroesophageal reflux disease)     Hypersomnolence 10/28/2020    Hypokalemia 12/4/2018    Paresthesia of upper extremity     Plantar fasciitis     Restless leg     Restless legs syndrome 4/8/2014    Sexual dysfunction     Sleep apnea, obstructive     Tenosynovitis of finger     Tinea corporis     Tobacco use 2018    Currently smoking 3-4 cigarettes daily    Trigger middle finger of left hand 2017    Trigger ring finger of left hand 2017    Weakness of upper extremity      Past Surgical History:   Procedure Laterality Date    ABDOMINAL SURGERY      CARPAL TUNNEL RELEASE Bilateral      SECTION      CHOLECYSTECTOMY      laparoscopic    ESOPHAGOGASTRODUODENOSCOPY N/A 12/3/2018    Procedure: ESOPHAGOGASTRODUODENOSCOPY (EGD) AND COLONOSCOPY;  Surgeon: Gemini Garcia MD;  Location: [facility];  Service: Gastroenterology    GASTRIC BYPASS      for morbid obesity with gilda en y    HERNIA REPAIR      HYSTERECTOMY      OR EXCIS PRIMARY GANGLION WRIST Left 2017    Procedure: LONG FINGER GANGLION CYST EXCISION;  Surgeon: Zbigniew Martinez MD;  [facility];  Service: Orthopedics    OR INCISE FINGER TENDON SHEATH Left 2017    Procedure: THUMB, LONG, RING, TRIGGER FINGER RELEASE;  Surgeon: Zbigniew Martinez MD;  [facility];  Service: Orthopedics    SHOULDER SURGERY       Social History   Social History     Substance and Sexual Activity   Alcohol Use Yes    Alcohol/week: 20 0 standard drinks    Types: 20 Cans of beer per week    Frequency: 4 or more times a week    Drinks per session: 3 or 4    Binge frequency: Never    Comment: about 6 coors light every night, stopped 3 weeks ago      Social History     Substance and Sexual Activity   Drug Use No     E-Cigarette/Vaping    E-Cigarette Use Never User      E-Cigarette/Vaping Substances    Nicotine No     THC No     CBD No     Flavoring No     Other No     Unknown No      Social History     Tobacco Use   Smoking Status Former Smoker    Packs/day: 0 25    Years: 29 00    Pack years: 7 25    Types: Cigarettes    Start date: 200    Quit date: 12/10/2019    Years since quittin 4   Smokeless Tobacco Never Used     Family History:   Family History   Problem Relation Age of Onset    Alzheimer's disease Mother     Atrial fibrillation Mother     Dementia Mother     Heart disease Mother         heart problem    Seizures Mother     Parkinsonism Father     Arthritis Father     Atrial fibrillation Father     Substance Abuse Neg Hx         mother,father    Mental illness Neg Hx         mother,father       Meds/Allergies   current meds:   Current Facility-Administered Medications   Medication Dose Route Frequency    acetaminophen (TYLENOL) tablet 650 mg  650 mg Oral Q6H PRN    albuterol (PROVENTIL HFA,VENTOLIN HFA) inhaler 2 puff  2 puff Inhalation Q4H PRN    atorvastatin (LIPITOR) tablet 40 mg  40 mg Oral Daily With Dinner    citalopram (CeleXA) tablet 20 mg  20 mg Oral Daily    flecainide (TAMBOCOR) tablet 100 mg  100 mg Oral Q12H [facility]    fluticasone (FLONASE) 50 mcg/act nasal spray 1 spray  1 spray Nasal BID    fluticasone (FLOVENT HFA) 220 mcg/act inhaler 2 puff  2 puff Inhalation BID    insulin lispro (HumaLOG) 100 units/mL subcutaneous injection 1-6 Units  1-6 Units Subcutaneous TID AC    insulin lispro (HumaLOG) 100 units/mL subcutaneous injection 1-6 Units  1-6 Units Subcutaneous HS    levalbuterol (XOPENEX) inhalation solution 1 25 mg  1 25 mg Nebulization BID    loratadine (CLARITIN) tablet 10 mg  10 mg Oral Daily    LORazepam (ATIVAN) tablet 1 mg  1 mg Oral HS    montelukast (SINGULAIR) tablet 10 mg  10 mg Oral HS    pantoprazole (PROTONIX) injection 40 mg  40 mg Intravenous Q12H [facility]    piperacillin-tazobactam (ZOSYN) IVPB 3 375 g  3 375 g Intravenous Q6H    traZODone (DESYREL) tablet 150 mg  150 mg Oral HS         Allergies   Allergen Reactions    Iron Dextran Swelling    Januvia [Sitagliptin] Shortness Of Breath    Jardiance [Empagliflozin] Shortness Of Breath    Clonazepam Other (See Comments)     Unknown reaction    Codeine Itching and Other (See Comments)     itch;mary kay morphine,takes ultram @home    Adhesive [Medical Tape] Itching     itching    Effexor [Venlafaxine] Itching    Lactose - Food Allergy GI Intolerance    Oxycodone-Acetaminophen Anxiety    Oxycodone-Acetaminophen Itching and Rash     Other reaction(s): Other (See Comments)  (PERCOCET) MILD RASH, not allergic to Acetaminophen        Objective   Temp:  [97 3 °F (36 3 °C)-98 7 °F (37 1 °C)] 98 6 °F (37 °C)  HR:  [] 81  Resp:  [15-22] 16  BP: (100-138)/(48-64) 106/48    Intake/Output Summary (Last 24 hours) at 6/2/2021 0856  Last data filed at 6/2/2021 0729  Gross per 24 hour   Intake 2450 ml   Output 2200 ml   Net 250 ml       Physical Exam    General Appearance: NAD, cooperative, alert, obese female sitting in chair, pale  Eyes: Anicteric, EOMI pale  ENT:  Normocephalic, atraumatic, normal mucosa, nasal cannula   Neck:    Supple, symmetrical, trachea midline  COR: RRR  Lungs: distant BS  GI:  Soft, slightly distended with mild  tenderness over the supra pubic region; no masses, no organomegaly, no guarding, few BS  Musculoskeletal: No cyanosis, clubbing or edema  Normal ROM    Skin:     No jaundice, rashes, or lesions   Heme/Lymph: No palpable cervical lymphadenopathy  Psych: Normal affect, good eye contact  Neuro: No gross deficits, AAOx3        Lab Results:   CBC:   Lab Results   Component Value Date    WBC 15 85 (H) 06/01/2021    HGB 8 0 (L) 06/02/2021    HCT 24 4 (L) 06/01/2021    MCV 73 (L) 06/01/2021     06/01/2021    MCH 21 7 (L) 06/01/2021    MCHC 29 6 (L) 06/01/2021    RDW 18 4 (H) 06/01/2021    MPV 11 9 06/01/2021    NRBC 0 06/01/2021   , BMP:  Lab Results   Component Value Date    SODIUM 136 06/01/2021    K 4 2 06/01/2021    CL 93 (L) 06/01/2021    CO2 33 (H) 06/01/2021    BUN 10 06/01/2021    CREATININE 0 76 06/01/2021    GLUC 316 (H) 06/01/2021    CALCIUM 8 6 06/01/2021    AGAP 10 06/01/2021    EGFR 84 06/01/2021   , PT/PTT:  Lab Results   Component Value Date    PTT 28 06/01/2021     Imaging Studies: I have personally reviewed pertinent reports          Shyla Jean PA-C

## 2021-06-02 NOTE — PROGRESS NOTES
Progress Note - Anastasia Fiore 56 y o  female MRN: [id number]    Unit/Bed#: -01 Encounter: [id number]    Assessment and Plan:    66-year-old female with asthma-COPD overlap syndrome, chronic respiratory failure on 4 L home O2, pulmonary HTN, type 2 DM, chronic diastolic CHF, AFib on Eliquis, and recent diverticulitis admitted with severe sepsis and acute blood loss anemia due to lower GI bleed  1) Lower GI bleeding with acute blood loss anemia:  Differential diagnosis includes ischemic colitis, diverticular bleed, segmental colitis associated with diverticular disease (SCAD)  Fortunately, her bleeding appears to have stopped  She received 2 units PRBCs yesterday  Hemoglobin currently 8 0  Vital signs stable      -No indication for urgent endoscopic evaluation at this time  -Monitor hemoglobin and stool color  -Continue clear liquid diet, advance to low residue as tolerated  -Continue to hold Eliquis    2) Diverticulitis: She was completing outpatient antibiotics prior to this admission  CT abdomen pelvis from 6/1 shows interval improvement in inflammatory changes of sigmoid colon with mild residual bowel thickening     -Continue IV Zosyn  -Appreciate recommendations from Surgery  -She will need colonoscopy in 4-6 weeks         Subjective:     Patient seen and examined at bedside  She is feeling better today  She denies any bowel movements or bleeding since last night in the ED  She has some mild left lower quadrant discomfort and mild nausea  She is tolerating liquid diet  Objective:     Vitals: Blood pressure 115/68, pulse 81, temperature 98 6 °F (37 °C), temperature source Axillary, resp  rate 16, height 5' 3" (1 6 m), weight 133 kg (293 lb 6 4 oz), SpO2 97 %, not currently breastfeeding  ,Body mass index is 51 97 kg/m²        Intake/Output Summary (Last 24 hours) at 6/2/2021 1029  Last data filed at 6/2/2021 1001  Gross per 24 hour   Intake 2450 ml   Output 2800 ml   Net -350 ml       Physical Exam: General Appearance: Alert, obese female, no acute distress  HEENT: Nasal cannula  Lungs: Clear to auscultation bilaterally  Heart: Regular rate and rhythm  Abdomen: Obese  Normal bowel sounds  Soft  Mild left lower quadrant tenderness to palpation  No rebound or guarding  Extremities: No cyanosis, edema    Invasive Devices     Peripheral Intravenous Line            Peripheral IV 06/01/21 Left Antecubital 1 day                Lab Results:  Results from last 7 days   Lab Units 06/02/21  0759  06/01/21  1421 06/01/21  1006   WBC Thousand/uL  --   --   --  15 85*   HEMOGLOBIN g/dL 8 0*   < > 7 2* 8 5*   HEMATOCRIT %  --   --  24 4* 28 7*   PLATELETS Thousands/uL  --   --   --  288   NEUTROS PCT %  --   --   --  73   LYMPHS PCT %  --   --   --  17   MONOS PCT %  --   --   --  7   EOS PCT %  --   --   --  2    < > = values in this interval not displayed  Results from last 7 days   Lab Units 06/01/21  1006   POTASSIUM mmol/L 4 2   CHLORIDE mmol/L 93*   CO2 mmol/L 33*   BUN mg/dL 10   CREATININE mg/dL 0 76   CALCIUM mg/dL 8 6   ALK PHOS U/L 176*   ALT U/L 91*   AST U/L 51*     Results from last 7 days   Lab Units 06/01/21  1006   INR  1 15     Results from last 7 days   Lab Units 06/01/21  1006   LIPASE u/L 72*       Imaging Studies: I have personally reviewed pertinent imaging studies  Xr Chest Portable    Result Date: 6/2/2021  Impression: No acute cardiopulmonary disease  Workstation performed: [id number]     Ct Abdomen Pelvis With Contrast    Result Date: 6/1/2021  Impression: 1  Interval improvement in inflammatory changes surrounding the previously inflamed loop of sigmoid colon, with mild residual wall thickening  2   Linear soft tissue density connecting this loop of sigmoid colon with the left ovary, may represent sinus tract  Since patient is status post hysterectomy, if present, a sinus tract would present with abscess formation as opposed to vaginal discharge  Currently, no abscess present    If patient develops constitutional symptoms following completion of diverticulitis treatment, consider follow-up CT abdomen and pelvis to evaluate for abscess   3   Hepatic steatosis Workstation performed: [id number]

## 2021-06-02 NOTE — CASE MANAGEMENT
LOS: 1 day  Bundle: No  Unplanned Readmission Score: 26 Green  30 Day Readmission: yes SLUB 5/2/21-5/5/21     CM met with patient at bedside  Explained the role of CM  Obtained the following information from patient  Home: ranch 2 steps to enter  Lives With: Alone  ADL's: independent  DME: CPAP supplies through [employer]  Ambulation:  Self  Transportation: Self  Pharmacy: [facility] #[id number]- [place] - [address]  PCP:  Monica Lopez MD  [facility] Hx: [facility]  Rehab Hx: No  Mental Health Hx: No  Substance Abuse Hx: No  Employment:No  POA/LW/AD: No/No/No declines information  Transport at D/C: Daughter    Patient reports she is current with [facility]  Referral sent via [address]  CM reviewed d/c planning process including the following: identifying help at home, patient preferences for d/c planning needs, availability of treatment team to discuss questions or concerns patient and/or family may have regarding understanding medications and recognizing signs and symptoms once discharged   CM following through discharge

## 2021-06-02 NOTE — PLAN OF CARE
Problem: Potential for Falls  Goal: Patient will remain free of falls  Description: INTERVENTIONS:  - Assess patient frequently for physical needs  -  Identify cognitive and physical deficits and behaviors that affect risk of falls    -  Protem fall precautions as indicated by assessment   - Educate patient/family on patient safety including physical limitations  - Instruct patient to call for assistance with activity based on assessment  - Modify environment to reduce risk of injury  - Consider OT/PT consult to assist with strengthening/mobility  Outcome: Progressing     Problem: PAIN - ADULT  Goal: Verbalizes/displays adequate comfort level or baseline comfort level  Description: Interventions:  - Encourage patient to monitor pain and request assistance  - Assess pain using appropriate pain scale  - Administer analgesics based on type and severity of pain and evaluate response  - Implement non-pharmacological measures as appropriate and evaluate response  - Consider cultural and social influences on pain and pain management  - Notify physician/advanced practitioner if interventions unsuccessful or patient reports new pain  Outcome: Progressing     Problem: INFECTION - ADULT  Goal: Absence or prevention of progression during hospitalization  Description: INTERVENTIONS:  - Assess and monitor for signs and symptoms of infection  - Monitor lab/diagnostic results  - Monitor all insertion sites, i e  indwelling lines, tubes, and drains  - Monitor endotracheal if appropriate and nasal secretions for changes in amount and color  - Protem appropriate cooling/warming therapies per order  - Administer medications as ordered  - Instruct and encourage patient and family to use good hand hygiene technique  - Identify and instruct in appropriate isolation precautions for identified infection/condition  Outcome: Progressing  Goal: Absence of fever/infection during neutropenic period  Description: INTERVENTIONS:  - Monitor WBC    Outcome: Progressing     Problem: SAFETY ADULT  Goal: Patient will remain free of falls  Description: INTERVENTIONS:  - Assess patient frequently for physical needs  -  Identify cognitive and physical deficits and behaviors that affect risk of falls    -  Woodson fall precautions as indicated by assessment   - Educate patient/family on patient safety including physical limitations  - Instruct patient to call for assistance with activity based on assessment  - Modify environment to reduce risk of injury  - Consider OT/PT consult to assist with strengthening/mobility  Outcome: Progressing  Goal: Maintain or return to baseline ADL function  Description: INTERVENTIONS:  -  Assess patient's ability to carry out ADLs; assess patient's baseline for ADL function and identify physical deficits which impact ability to perform ADLs (bathing, care of mouth/teeth, toileting, grooming, dressing, etc )  - Assess/evaluate cause of self-care deficits   - Assess range of motion  - Assess patient's mobility; develop plan if impaired  - Assess patient's need for assistive devices and provide as appropriate  - Encourage maximum independence but intervene and supervise when necessary  - Involve family in performance of ADLs  - Assess for home care needs following discharge   - Consider OT consult to assist with ADL evaluation and planning for discharge  - Provide patient education as appropriate  Outcome: Progressing  Goal: Maintain or return mobility status to optimal level  Description: INTERVENTIONS:  - Assess patient's baseline mobility status (ambulation, transfers, stairs, etc )    - Identify cognitive and physical deficits and behaviors that affect mobility  - Identify mobility aids required to assist with transfers and/or ambulation (gait belt, sit-to-stand, lift, walker, cane, etc )  - Woodson fall precautions as indicated by assessment  - Record patient progress and toleration of activity level on Mobility SBAR; progress patient to next Phase/Stage  - Instruct patient to call for assistance with activity based on assessment  - Consider rehabilitation consult to assist with strengthening/weightbearing, etc   Outcome: Progressing     Problem: DISCHARGE PLANNING  Goal: Discharge to home or other facility with appropriate resources  Description: INTERVENTIONS:  - Identify barriers to discharge w/patient and caregiver  - Arrange for needed discharge resources and transportation as appropriate  - Identify discharge learning needs (meds, wound care, etc )  - Arrange for interpretive services to assist at discharge as needed  - Refer to Case Management Department for coordinating discharge planning if the patient needs post-hospital services based on physician/advanced practitioner order or complex needs related to functional status, cognitive ability, or social support system  Outcome: Progressing     Problem: Knowledge Deficit  Goal: Patient/family/caregiver demonstrates understanding of disease process, treatment plan, medications, and discharge instructions  Description: Complete learning assessment and assess knowledge base    Interventions:  - Provide teaching at level of understanding  - Provide teaching via preferred learning methods  Outcome: Progressing

## 2021-06-03 PROBLEM — A41.9 SEVERE SEPSIS (HCC): Status: RESOLVED | Noted: 2020-05-29 | Resolved: 2021-06-03

## 2021-06-03 PROBLEM — R65.20 SEVERE SEPSIS (HCC): Status: RESOLVED | Noted: 2020-05-29 | Resolved: 2021-06-03

## 2021-06-03 LAB
ALBUMIN SERPL BCP-MCNC: 2.6 G/DL (ref 3.5–5)
ALP SERPL-CCNC: 137 U/L (ref 46–116)
ALT SERPL W P-5'-P-CCNC: 45 U/L (ref 12–78)
ANION GAP SERPL CALCULATED.3IONS-SCNC: 6 MMOL/L (ref 4–13)
AST SERPL W P-5'-P-CCNC: 17 U/L (ref 5–45)
ATRIAL RATE: 73 BPM
BASOPHILS # BLD AUTO: 0.04 THOUSANDS/ΜL (ref 0–0.1)
BASOPHILS NFR BLD AUTO: 1 % (ref 0–1)
BILIRUB DIRECT SERPL-MCNC: 0.19 MG/DL (ref 0–0.2)
BILIRUB SERPL-MCNC: 0.3 MG/DL (ref 0.2–1)
BUN SERPL-MCNC: 3 MG/DL (ref 5–25)
CALCIUM SERPL-MCNC: 7.8 MG/DL (ref 8.3–10.1)
CHLORIDE SERPL-SCNC: 101 MMOL/L (ref 100–108)
CO2 SERPL-SCNC: 33 MMOL/L (ref 21–32)
CREAT SERPL-MCNC: 0.53 MG/DL (ref 0.6–1.3)
EOSINOPHIL # BLD AUTO: 0.34 THOUSAND/ΜL (ref 0–0.61)
EOSINOPHIL NFR BLD AUTO: 5 % (ref 0–6)
ERYTHROCYTE [DISTWIDTH] IN BLOOD BY AUTOMATED COUNT: 19.9 % (ref 11.6–15.1)
GFR SERPL CREATININE-BSD FRML MDRD: 102 ML/MIN/1.73SQ M
GLUCOSE SERPL-MCNC: 201 MG/DL (ref 65–140)
GLUCOSE SERPL-MCNC: 228 MG/DL (ref 65–140)
GLUCOSE SERPL-MCNC: 252 MG/DL (ref 65–140)
GLUCOSE SERPL-MCNC: 272 MG/DL (ref 65–140)
GLUCOSE SERPL-MCNC: 273 MG/DL (ref 65–140)
GLUCOSE SERPL-MCNC: 334 MG/DL (ref 65–140)
HCT VFR BLD AUTO: 23.5 % (ref 34.8–46.1)
HGB BLD-MCNC: 7.1 G/DL (ref 11.5–15.4)
HGB BLD-MCNC: 7.1 G/DL (ref 11.5–15.4)
HGB BLD-MCNC: 7.2 G/DL (ref 11.5–15.4)
HGB BLD-MCNC: 9.2 G/DL (ref 11.5–15.4)
IMM GRANULOCYTES # BLD AUTO: 0.07 THOUSAND/UL (ref 0–0.2)
IMM GRANULOCYTES NFR BLD AUTO: 1 % (ref 0–2)
LYMPHOCYTES # BLD AUTO: 1.47 THOUSANDS/ΜL (ref 0.6–4.47)
LYMPHOCYTES NFR BLD AUTO: 20 % (ref 14–44)
MCH RBC QN AUTO: 23.7 PG (ref 26.8–34.3)
MCHC RBC AUTO-ENTMCNC: 30.6 G/DL (ref 31.4–37.4)
MCV RBC AUTO: 77 FL (ref 82–98)
MONOCYTES # BLD AUTO: 0.55 THOUSAND/ΜL (ref 0.17–1.22)
MONOCYTES NFR BLD AUTO: 8 % (ref 4–12)
NEUTROPHILS # BLD AUTO: 4.78 THOUSANDS/ΜL (ref 1.85–7.62)
NEUTS SEG NFR BLD AUTO: 65 % (ref 43–75)
NRBC BLD AUTO-RTO: 0 /100 WBCS
P AXIS: 81 DEGREES
PLATELET # BLD AUTO: 229 THOUSANDS/UL (ref 149–390)
PMV BLD AUTO: 11.2 FL (ref 8.9–12.7)
POTASSIUM SERPL-SCNC: 3.7 MMOL/L (ref 3.5–5.3)
PR INTERVAL: 190 MS
PROT SERPL-MCNC: 5.4 G/DL (ref 6.4–8.2)
QRS AXIS: 51 DEGREES
QRSD INTERVAL: 82 MS
QT INTERVAL: 404 MS
QTC INTERVAL: 445 MS
RBC # BLD AUTO: 3.04 MILLION/UL (ref 3.81–5.12)
SODIUM SERPL-SCNC: 140 MMOL/L (ref 136–145)
T WAVE AXIS: 66 DEGREES
TROPONIN I SERPL-MCNC: <0.02 NG/ML
VENTRICULAR RATE: 73 BPM
WBC # BLD AUTO: 7.25 THOUSAND/UL (ref 4.31–10.16)

## 2021-06-03 PROCEDURE — 97167 OT EVAL HIGH COMPLEX 60 MIN: CPT

## 2021-06-03 PROCEDURE — 94760 N-INVAS EAR/PLS OXIMETRY 1: CPT

## 2021-06-03 PROCEDURE — 99232 SBSQ HOSP IP/OBS MODERATE 35: CPT | Performed by: INTERNAL MEDICINE

## 2021-06-03 PROCEDURE — 85025 COMPLETE CBC W/AUTO DIFF WBC: CPT | Performed by: PHYSICIAN ASSISTANT

## 2021-06-03 PROCEDURE — 99232 SBSQ HOSP IP/OBS MODERATE 35: CPT | Performed by: SURGERY

## 2021-06-03 PROCEDURE — 82948 REAGENT STRIP/BLOOD GLUCOSE: CPT

## 2021-06-03 PROCEDURE — P9016 RBC LEUKOCYTES REDUCED: HCPCS

## 2021-06-03 PROCEDURE — 94640 AIRWAY INHALATION TREATMENT: CPT

## 2021-06-03 PROCEDURE — C9113 INJ PANTOPRAZOLE SODIUM, VIA: HCPCS | Performed by: PHYSICIAN ASSISTANT

## 2021-06-03 PROCEDURE — 85018 HEMOGLOBIN: CPT | Performed by: INTERNAL MEDICINE

## 2021-06-03 PROCEDURE — 84484 ASSAY OF TROPONIN QUANT: CPT | Performed by: INTERNAL MEDICINE

## 2021-06-03 PROCEDURE — 94664 DEMO&/EVAL PT USE INHALER: CPT

## 2021-06-03 PROCEDURE — 99233 SBSQ HOSP IP/OBS HIGH 50: CPT | Performed by: INTERNAL MEDICINE

## 2021-06-03 PROCEDURE — 93005 ELECTROCARDIOGRAM TRACING: CPT

## 2021-06-03 PROCEDURE — 99254 IP/OBS CNSLTJ NEW/EST MOD 60: CPT | Performed by: INTERNAL MEDICINE

## 2021-06-03 PROCEDURE — 93010 ELECTROCARDIOGRAM REPORT: CPT | Performed by: INTERNAL MEDICINE

## 2021-06-03 PROCEDURE — 94660 CPAP INITIATION&MGMT: CPT

## 2021-06-03 PROCEDURE — 80076 HEPATIC FUNCTION PANEL: CPT | Performed by: PHYSICIAN ASSISTANT

## 2021-06-03 PROCEDURE — 97162 PT EVAL MOD COMPLEX 30 MIN: CPT

## 2021-06-03 PROCEDURE — 80048 BASIC METABOLIC PNL TOTAL CA: CPT | Performed by: PHYSICIAN ASSISTANT

## 2021-06-03 RX ORDER — DIPHENHYDRAMINE HCL 25 MG
50 TABLET ORAL ONCE
Status: COMPLETED | OUTPATIENT
Start: 2021-06-03 | End: 2021-06-03

## 2021-06-03 RX ORDER — BUMETANIDE 0.25 MG/ML
1 INJECTION, SOLUTION INTRAMUSCULAR; INTRAVENOUS 2 TIMES DAILY
Status: DISCONTINUED | OUTPATIENT
Start: 2021-06-03 | End: 2021-06-03

## 2021-06-03 RX ORDER — METOPROLOL TARTRATE 5 MG/5ML
2.5 INJECTION INTRAVENOUS EVERY 6 HOURS PRN
Status: DISCONTINUED | OUTPATIENT
Start: 2021-06-03 | End: 2021-06-07 | Stop reason: HOSPADM

## 2021-06-03 RX ORDER — METFORMIN HYDROCHLORIDE 500 MG/1
1000 TABLET, EXTENDED RELEASE ORAL 2 TIMES DAILY WITH MEALS
Qty: 120 TABLET | Refills: 5 | Status: SHIPPED | OUTPATIENT
Start: 2021-06-03 | End: 2022-03-30

## 2021-06-03 RX ORDER — GLIMEPIRIDE 4 MG/1
4 TABLET ORAL
Qty: 30 TABLET | Refills: 0 | Status: SHIPPED | OUTPATIENT
Start: 2021-06-03 | End: 2021-06-27

## 2021-06-03 RX ORDER — METOPROLOL TARTRATE 5 MG/5ML
2.5 INJECTION INTRAVENOUS EVERY 6 HOURS PRN
Status: DISCONTINUED | OUTPATIENT
Start: 2021-06-03 | End: 2021-06-03

## 2021-06-03 RX ORDER — BUMETANIDE 0.25 MG/ML
1 INJECTION, SOLUTION INTRAMUSCULAR; INTRAVENOUS ONCE
Status: COMPLETED | OUTPATIENT
Start: 2021-06-03 | End: 2021-06-03

## 2021-06-03 RX ORDER — LEVALBUTEROL 1.25 MG/.5ML
1.25 SOLUTION, CONCENTRATE RESPIRATORY (INHALATION)
Status: DISCONTINUED | OUTPATIENT
Start: 2021-06-03 | End: 2021-06-05

## 2021-06-03 RX ORDER — LEVALBUTEROL INHALATION SOLUTION 0.63 MG/3ML
0.63 SOLUTION RESPIRATORY (INHALATION)
Status: DISCONTINUED | OUTPATIENT
Start: 2021-06-03 | End: 2021-06-03

## 2021-06-03 RX ORDER — LORAZEPAM 0.5 MG/1
TABLET ORAL
Qty: 90 TABLET | Refills: 0 | Status: SHIPPED | OUTPATIENT
Start: 2021-06-03 | End: 2021-06-16 | Stop reason: SDUPTHER

## 2021-06-03 RX ORDER — METOPROLOL SUCCINATE 50 MG/1
50 TABLET, EXTENDED RELEASE ORAL DAILY
Status: DISCONTINUED | OUTPATIENT
Start: 2021-06-03 | End: 2021-06-07 | Stop reason: HOSPADM

## 2021-06-03 RX ADMIN — METOPROLOL SUCCINATE 50 MG: 50 TABLET, EXTENDED RELEASE ORAL at 12:37

## 2021-06-03 RX ADMIN — Medication 3.38 G: at 00:28

## 2021-06-03 RX ADMIN — METOPROLOL TARTRATE 2.5 MG: 5 INJECTION INTRAVENOUS at 08:11

## 2021-06-03 RX ADMIN — TRAZODONE HYDROCHLORIDE 150 MG: 150 TABLET ORAL at 22:19

## 2021-06-03 RX ADMIN — BUMETANIDE 1 MG: 0.25 INJECTION, SOLUTION INTRAMUSCULAR; INTRAVENOUS at 17:11

## 2021-06-03 RX ADMIN — INSULIN GLARGINE 10 UNITS: 100 INJECTION, SOLUTION SUBCUTANEOUS at 22:19

## 2021-06-03 RX ADMIN — MONTELUKAST SODIUM 10 MG: 10 TABLET, FILM COATED ORAL at 22:19

## 2021-06-03 RX ADMIN — FLUTICASONE PROPIONATE 1 SPRAY: 50 SPRAY, METERED NASAL at 08:23

## 2021-06-03 RX ADMIN — FLUTICASONE PROPIONATE 2 PUFF: 220 AEROSOL, METERED RESPIRATORY (INHALATION) at 08:23

## 2021-06-03 RX ADMIN — INSULIN LISPRO 5 UNITS: 100 INJECTION, SOLUTION INTRAVENOUS; SUBCUTANEOUS at 08:27

## 2021-06-03 RX ADMIN — INSULIN LISPRO 4 UNITS: 100 INJECTION, SOLUTION INTRAVENOUS; SUBCUTANEOUS at 08:24

## 2021-06-03 RX ADMIN — Medication 3.38 G: at 06:37

## 2021-06-03 RX ADMIN — LORAZEPAM 1 MG: 1 TABLET ORAL at 22:19

## 2021-06-03 RX ADMIN — FLECAINIDE ACETATE 100 MG: 100 TABLET ORAL at 20:20

## 2021-06-03 RX ADMIN — FLUTICASONE PROPIONATE 1 SPRAY: 50 SPRAY, METERED NASAL at 20:14

## 2021-06-03 RX ADMIN — LORATADINE 10 MG: 10 TABLET ORAL at 08:22

## 2021-06-03 RX ADMIN — ACETAMINOPHEN 650 MG: 325 TABLET, FILM COATED ORAL at 10:29

## 2021-06-03 RX ADMIN — Medication 3.38 G: at 12:38

## 2021-06-03 RX ADMIN — FLUTICASONE PROPIONATE 2 PUFF: 220 AEROSOL, METERED RESPIRATORY (INHALATION) at 20:15

## 2021-06-03 RX ADMIN — FLECAINIDE ACETATE 100 MG: 100 TABLET ORAL at 08:23

## 2021-06-03 RX ADMIN — IRON SUCROSE 300 MG: 20 INJECTION, SOLUTION INTRAVENOUS at 10:28

## 2021-06-03 RX ADMIN — INSULIN LISPRO 2 UNITS: 100 INJECTION, SOLUTION INTRAVENOUS; SUBCUTANEOUS at 22:21

## 2021-06-03 RX ADMIN — PANTOPRAZOLE SODIUM 40 MG: 40 INJECTION, POWDER, FOR SOLUTION INTRAVENOUS at 13:24

## 2021-06-03 RX ADMIN — LEVALBUTEROL HYDROCHLORIDE 1.25 MG: 1.25 SOLUTION, CONCENTRATE RESPIRATORY (INHALATION) at 19:49

## 2021-06-03 RX ADMIN — CITALOPRAM HYDROBROMIDE 20 MG: 20 TABLET ORAL at 08:23

## 2021-06-03 RX ADMIN — DIPHENHYDRAMINE HYDROCHLORIDE 50 MG: 25 TABLET ORAL at 10:42

## 2021-06-03 RX ADMIN — METFORMIN HYDROCHLORIDE 500 MG: 500 TABLET ORAL at 09:19

## 2021-06-03 RX ADMIN — INSULIN LISPRO 5 UNITS: 100 INJECTION, SOLUTION INTRAVENOUS; SUBCUTANEOUS at 12:37

## 2021-06-03 RX ADMIN — METFORMIN HYDROCHLORIDE 500 MG: 500 TABLET ORAL at 16:11

## 2021-06-03 RX ADMIN — FUROSEMIDE 40 MG: 10 INJECTION, SOLUTION INTRAVENOUS at 08:23

## 2021-06-03 RX ADMIN — Medication 3.38 G: at 17:11

## 2021-06-03 RX ADMIN — ATORVASTATIN CALCIUM 40 MG: 40 TABLET, FILM COATED ORAL at 16:07

## 2021-06-03 RX ADMIN — INSULIN LISPRO 5 UNITS: 100 INJECTION, SOLUTION INTRAVENOUS; SUBCUTANEOUS at 16:13

## 2021-06-03 RX ADMIN — DIPHENHYDRAMINE HYDROCHLORIDE 50 MG: 25 TABLET ORAL at 10:29

## 2021-06-03 RX ADMIN — PANTOPRAZOLE SODIUM 40 MG: 40 INJECTION, POWDER, FOR SOLUTION INTRAVENOUS at 01:21

## 2021-06-03 RX ADMIN — LEVALBUTEROL HYDROCHLORIDE 0.63 MG: 0.63 SOLUTION RESPIRATORY (INHALATION) at 08:30

## 2021-06-03 RX ADMIN — INSULIN LISPRO 3 UNITS: 100 INJECTION, SOLUTION INTRAVENOUS; SUBCUTANEOUS at 16:12

## 2021-06-03 NOTE — PROGRESS NOTES
Progress Note - Ileana Rg 56 y o  female MRN: [id number]    Unit/Bed#: -01 Encounter: [id number]      Assessment:  Principal Problem (Resolved):    Severe sepsis ([facility] [address] )  Active Problems:    Chronic respiratory failure with hypoxia (HCC)    Atrial fibrillation, rapid (HCC)    Chronic diastolic congestive heart failure (HCC)    Lower gastrointestinal bleeding    Transaminitis    Benign essential hypertension    Esophageal reflux    Type 2 diabetes mellitus, without long-term current use of insulin (HCC)    Acute blood loss anemia  Resolved Problems:    Lactic acidosis        Plan:  · Lower GI bleed-with acute blood loss anemia-possible diverticular associated colitis or ischemic colitis as cause of symptoms-hemoglobin 7 2 today will give 1/3 unit of packed red blood cells given her symptoms some chest pain today and rapid AFib  · AFib with rapid ventricular response-patient rates jumped in the 140s 160-given low dose of IV Lopressor at 2 5 mg and is now converted back to sinus rhythm-she is listed as having history of chronic atrial fibrillation and has followed with Dr Ok Morales has offices in the past-had echo last fall with stage II diastolic dysfunction but normal LV contractility-will check serial EKGs and consult Cardiology- ekg now shows nsr at 73  · dmtype 2- 201- on coverage    Subjective:   Patient did not have diarrhea this morning that had gone to the bathroom  Then sitting in bed she noticed that her heart was beating faster-has had episodes of this proximal once or twice weekly that only last for a minute or so  Her heart rates were noted to be in the 140s with atrial fib with rapid ventricular response  She does have some shortness of breath with this and feels weak and fatigued  She has slight chest discomfort but no diaphoresis  ROS  Comprehensive system review negative other than noted above    Objective:     Vitals: Blood pressure 111/55, pulse 72, temperature 97 6 °F (36 4 °C), resp  rate 18, height 5' 3" (1 6 m), weight 132 kg (290 lb 6 4 oz), SpO2 99 %, not currently breastfeeding  ,Body mass index is 51 44 kg/m²    Current Facility-Administered Medications   Medication Dose Route Frequency Provider Last Rate Last Admin    acetaminophen (TYLENOL) tablet 650 mg  650 mg Oral Q6H PRN Preeti Hunt PA-C   650 mg at 06/02/21 2104    albuterol (PROVENTIL HFA,VENTOLIN HFA) inhaler 2 puff  2 puff Inhalation Q4H PRN Ivy Crocker PA-C        atorvastatin (LIPITOR) tablet 40 mg  40 mg Oral Daily With  ROSALIO Salinas   40 mg at 06/02/21 1711    citalopram (CeleXA) tablet 20 mg  20 mg Oral Daily Ismaelneelam Mercedes PA-C   20 mg at 06/03/21 0486    flecainide (TAMBOCOR) tablet 100 mg  100 mg Oral Q12H [address] [place] Holl[place] PA-C   100 mg at 06/03/21 1477    fluticasone (FLONASE) 50 mcg/act nasal spray 1 spray  1 spray Nasal BID Ivy Crocker PA-C   1 spray at 06/03/21 6464    fluticasone (FLOVENT HFA) 220 mcg/act inhaler 2 puff  2 puff Inhalation BID Ivy Crocker PA-C   2 puff at 06/03/21 7853    furosemide (LASIX) injection 40 mg  40 mg Intravenous BID (diuretic) Ivy Crocker PA-C   40 mg at 06/03/21 1169    insulin glargine (LANTUS) subcutaneous injection 10 Units 0 1 mL  10 Units Subcutaneous HS Ivy Crocker PA-C   10 Units at 06/02/21 2202    insulin lispro (HumaLOG) 100 units/mL subcutaneous injection 1-6 Units  1-6 Units Subcutaneous TID [facility] Ivy Crocker PA-C   4 Units at 06/03/21 2775    insulin lispro (HumaLOG) 100 units/mL subcutaneous injection 1-6 Units  1-6 Units Subcutaneous HS Ivy Crocker PA-C   3 Units at 06/02/21 2202    insulin lispro (HumaLOG) 100 units/mL subcutaneous injection 5 Units  5 Units Subcutaneous TID With Meals Ivy Crocker PA-C   5 Units at 06/03/21 0827    iron sucrose (VENOFER) 300 mg in sodium chloride 0 9 % 250 mL IVPB  300 mg Intravenous Daily Lady Sj DO        levalbuterol (XOPENEX) inhalation solution 0 63 mg  0 63 mg Nebulization BID Irma Fly, DO   0 63 mg at 06/03/21 0830    loratadine (CLARITIN) tablet 10 mg  10 mg Oral Daily COURTNEY PatelC   10 mg at 06/03/21 5907    LORazepam (ATIVAN) tablet 1 mg  1 mg Oral HS Rosamaria CarterMARIELA thorne-C   1 mg at 06/02/21 2105    metoprolol (LOPRESSOR) injection 2 5 mg  2 5 mg Intravenous Q6H PRN Yusuf Schmittz, DO   2 5 mg at 06/03/21 0811    montelukast (SINGULAIR) tablet 10 mg  10 mg Oral HS Lakesha Lazcano PA-C   10 mg at 06/02/21 2105    pantoprazole (PROTONIX) injection 40 mg  40 mg Intravenous Q12H [address] Mikricky Lazcano, PA-C   40 mg at 06/03/21 0121    piperacillin-tazobactam (ZOSYN) IVPB 3 375 g  3 375 g Intravenous Q6H Lakesha Lazcano, PA-C   3 375 g at 06/03/21 2072    traZODone (DESYREL) tablet 150 mg  150 mg Oral HS Lakesha Lazcano PA-C   150 mg at 06/02/21 2202     Medications Prior to Admission   Medication    albuterol (PROVENTIL HFA,VENTOLIN HFA) 90 mcg/act inhaler    amoxicillin-clavulanate (AUGMENTIN) 875-125 mg per tablet    apixaban (Eliquis) 5 mg    ascorbic acid (VITAMIN C) 1000 MG tablet    atorvastatin (LIPITOR) 40 mg tablet    Blood Glucose Monitoring Suppl (tadoÂ° CONTOUR NEXT MONITOR) w/Device KIT    Cholecalciferol 1000 units tablet    citalopram (CeleXA) 20 mg tablet    cyanocobalamin (VITAMIN B-12) 100 mcg tablet    ferrous sulfate (FeroSul) 220 (44 Fe) mg/5 mL solution    flecainide (TAMBOCOR) 100 mg tablet    fluticasone (FLONASE) 50 mcg/act nasal spray    fluticasone (FLOVENT HFA) 220 mcg/act inhaler    glimepiride (AMARYL) 4 mg tablet    glucose blood (CONTOUR NEXT TEST) test strip    glycopyrrolate-formoterol (BEVESPI AEROSPHERE) 9-4 8 MCG/ACT inhaler    levalbuterol (XOPENEX) 1 25 mg/0 5 mL nebulizer solution    [employer])CAN UNISTIK II LANCETS MISC    loratadine (Claritin) 10 mg tablet    LORazepam (ATIVAN) 0 5 mg tablet    Magnesium 400 MG CAPS    metFORMIN (GLUCOPHAGE-XR) 500 mg 24 hr tablet    metoprolol succinate (TOPROL-XL) 50 mg 24 hr tablet    montelukast (SINGULAIR) 10 mg tablet    naloxone (NARCAN) 4 mg/0 1 mL nasal spray    omeprazole (PriLOSEC) 40 MG capsule    potassium chloride (K-DUR,KLOR-CON) 20 mEq tablet    torsemide (DEMADEX) 20 mg tablet    traZODone (DESYREL) 150 mg tablet         Intake/Output Summary (Last 24 hours) at 6/3/2021 0850  Last data filed at 6/3/2021 0701  Gross per 24 hour   Intake 1768 ml   Output 5251 ml   Net -3483 ml       Physical Exam:  General appearance: alert and cooperative pale  Neck: no adenopathy, no JVD, supple, symmetrical, trachea midline and thyroid not enlarged, symmetric, no tenderness/mass/nodules  Lungs:  Decreased bs in bases- no wheezes  Heart: irregularly irregular rhythm rates 120  Abdomen: obeses nondistended  Extremities: extremities normal, warm and well-perfused; no cyanosis, clubbing, or edema  Skin: Skin color, texture, turgor normal  No rashes or lesions  Neurologic: no tremors alert and oriented       Lab, Imaging and other studies: I have personally reviewed pertinent reports  Results from last 7 days   Lab Units 06/03/21  0755 06/03/21  0501 06/03/21  0146  06/01/21  1421 06/01/21  1006   WBC Thousand/uL  --  7 25  --   --   --  15 85*   HEMOGLOBIN g/dL 7 1* 7 2* 7 1*   < > 7 2* 8 5*   HEMATOCRIT %  --  23 5*  --   --  24 4* 28 7*   PLATELETS Thousands/uL  --  229  --   --   --  288   NEUTROS PCT %  --  65  --   --   --  73   LYMPHS PCT %  --  20  --   --   --  17   MONOS PCT %  --  8  --   --   --  7   EOS PCT %  --  5  --   --   --  2    < > = values in this interval not displayed       Results from last 7 days   Lab Units 06/03/21  0501 06/01/21  1006   POTASSIUM mmol/L 3 7 4 2   CHLORIDE mmol/L 101 93*   CO2 mmol/L 33* 33*   BUN mg/dL 3* 10   CREATININE mg/dL 0 53* 0 76   CALCIUM mg/dL 7 8* 8 6   ALK PHOS U/L 137* 176*   ALT U/L 45 91*   AST U/L 17 51*     Lab Results   Component Value Date    TROPONINI <0 02 04/15/2021 TROPONINI <0 02 01/11/2021    TROPONINI <0 02 11/12/2020    CKTOTAL 39 09/07/2017     Results from last 7 days   Lab Units 06/01/21  1006   INR  1 15     Lab Results   Component Value Date    BLOODCX No Growth at 24 hrs  06/01/2021    BLOODCX No Growth at 24 hrs  06/01/2021    BLOODCX No Growth After 5 Days  05/02/2021    BLOODCX No Growth After 5 Days  05/02/2021    SPUTUMCULTUR 2+ Growth of  05/21/2020    SPUTUMCULTUR  05/21/2020     Commensal respiratory nicholas only; No significant growth of Staph aureus/MRSA or Pseudomonas aeruginosa  Imaging:  Results for orders placed during the hospital encounter of 06/01/21   XR chest portable    Narrative CHEST     INDICATION:   severe sepsis  COMPARISON:  4/15/2021    EXAM PERFORMED/VIEWS:  XR CHEST PORTABLE      FINDINGS:    Cardiomediastinal silhouette appears unremarkable  The lungs are clear  No pneumothorax or pleural effusion  Osseous structures appear within normal limits for patient age  Impression No acute cardiopulmonary disease  Workstation performed: [id number]       Results for orders placed during the hospital encounter of 12/19/16   XR chest pa & lateral    Narrative CHEST - DUAL ENERGY    INDICATION: Cough with shortness of breath and mid chest pain x2 months  COMPARISON: 11/10/2016, 11/18/2016  VIEWS:  PA (including soft tissue/bone algorithms) and lateral projections; 4 images    FINDINGS:    The cardiomediastinal silhouette is unremarkable  No acute infiltrates  Persistent linear opacities in the lingula consistent with atelectasis or scarring  No pleural effusions  Visualized osseous structures appear within normal limits for patient's age  Impression No acute pulmonary disease  Workstation performed: [id number]         PATIENT CENTERED ROUNDS: I have performed rounds with the nursing staff            Loraine Monsivais DO

## 2021-06-03 NOTE — PROGRESS NOTES
Progress Note - Roxana Handing 56 y o  female MRN: [id number]    Unit/Bed#: -01 Encounter: [id number]      Assessment/Plan:   Rectal bleeding, Diverticulitis  - CT scan thickening and inflammation of sigmoid colon  - s/p transfusion one unit, pt underwent A fib last night, slim managing transfusing another unit, lopressor  - continue to monitor closely, monitor Hgb and signs of bleeding   -Hold Eliquis, and if it recurs, recommend colonscopy  -Cont abx for diverticulitis, low residue diet when ready      Subjective:   No complaints, undergoing breathing tx during exam    Objective:     Vitals: Blood pressure 111/55, pulse 72, temperature 97 6 °F (36 4 °C), resp  rate 18, height 5' 3" (1 6 m), weight 132 kg (290 lb 6 4 oz), SpO2 99 %, not currently breastfeeding  ,Body mass index is 51 44 kg/m²        Intake/Output Summary (Last 24 hours) at 6/3/2021 1021  Last data filed at 6/3/2021 0701  Gross per 24 hour   Intake 1448 ml   Output 4651 ml   Net -3203 ml       Physical Exam:     Gen: Awake, alert, obese female, sitting chair, with breathing treatment  Head: NC AT  CV: mildly tachy, irregular rhythm  Lungs: shallow breathing, +decr lung sounds  Abdomen: obese, non distended, no tenderness  Extremities: normal, warm and no rashes      Invasive Devices     Peripheral Intravenous Line            Peripheral IV 06/02/21 Left Hand less than 1 day

## 2021-06-03 NOTE — PHYSICAL THERAPY NOTE
PHYSICAL THERAPY EVAL  Physical Therapy Evaluation      Patient Active Problem List   Diagnosis    Ganglion cyst of flexor tendon sheath    Benign essential hypertension    Esophageal reflux    Obstructive sleep apnea    Cervical radiculopathy    Chronic low back pain    Hypercholesterolemia    Severe persistent asthma with exacerbation    Type 2 diabetes mellitus, without long-term current use of insulin (HCC)    Chronic respiratory failure with hypoxia (HCC)    Abnormal computed tomography angiography (CTA) of abdomen    Abnormal CT of the abdomen    Iron deficiency anemia due to chronic blood loss    Elevated liver enzymes    Lumbar radiculopathy    Paresthesia of upper extremity    Sexual dysfunction    Atrial fibrillation, rapid (HCC)    Severe persistent asthma    Current moderate episode of major depressive disorder without prior episode (HCC)    Neck pain, chronic    Cigarette nicotine dependence without complication    Chronic diastolic congestive heart failure (HCC)    Microcytic anemia    Abnormal CT of the chest    Asthma-COPD overlap syndrome (HCC)    Insomnia    Asthma with COPD with exacerbation (HCC)    Chronic respiratory failure with hypoxia and hypercapnia (HCC)    Pulmonary hypertension (HCC)    Class 3 severe obesity in adult [facility])    Acute diverticulitis    Gastric polyp    Lower gastrointestinal bleeding    Transaminitis    Acute blood loss anemia       Past Medical History:   Diagnosis Date    Alcohol abuse 5/21/2020    Alcohol abuse 5/21/2020    Anemia     Atrial fibrillation (HCC)     Cervical radiculopathy     CHF (congestive heart failure) (HCC)     Chronic low back pain     Chronic obstructive asthma ([facility] ) 2/20/2018    Community acquired pneumonia     Community acquired pneumonia 5/21/2020    Depression with anxiety 3/9/2014    Diabetes mellitus ([facility] )     Diabetes mellitus with hyperglycemia (HCC)     Elevated liver enzymes     GERD (gastroesophageal reflux disease)     Hypersomnolence 10/28/2020    Hypokalemia 2018    Paresthesia of upper extremity     Plantar fasciitis     Restless leg     Restless legs syndrome 2014    Sexual dysfunction     Sleep apnea, obstructive     Tenosynovitis of finger     Tinea corporis     Tobacco use 2018    Currently smoking 3-4 cigarettes daily    Trigger middle finger of left hand 2017    Trigger ring finger of left hand 2017    Weakness of upper extremity        Past Surgical History:   Procedure Laterality Date    ABDOMINAL SURGERY      CARPAL TUNNEL RELEASE Bilateral      SECTION      CHOLECYSTECTOMY      laparoscopic    ESOPHAGOGASTRODUODENOSCOPY N/A 12/3/2018    Procedure: ESOPHAGOGASTRODUODENOSCOPY (EGD) AND COLONOSCOPY;  Surgeon: Orlin Titus MD;  [facility];  Service: Gastroenterology    GASTRIC BYPASS      for morbid obesity with gilda en y   [address] PRIMARY GANGLION WRIST Left 2017    Procedure: LONG FINGER GANGLION CYST EXCISION;  Surgeon: Varsha Adams MD;  [facility];  Service: Orthopedics    MD INCISE FINGER TENDON SHEATH Left 2017    Procedure: All Márquez, RING, TRIGGER FINGER RELEASE;  Surgeon: Varsha Adams MD;  [facility];  Service: Orthopedics   Terrance Shaver, PT         21 1030   PT Last Visit   PT Visit Date 21   Note Type   Note type Evaluation   Pain Assessment   Pain Assessment Tool Pain Assessment not indicated - pt denies pain   Pain Score No Pain   Home Living   Type of [address] One level  (2STE)   Home Equipment   (4L 02)   Prior Function   Level of [place] Independent with ADLs and functional mobility   Lives With Alone   ADL Assistance Independent   IADLs Independent   Falls in the last 6 months 0   Comments Receiving home PT and nursing prior to admission   Restrictions/Precautions   Other Precautions Fall Risk;O2   General   Family/Caregiver Present No   Cognition   Overall Cognitive Status WFL   Arousal/Participation Alert   Attention Within functional limits   Orientation Level Oriented X4   Memory Within functional limits   Following Commands Follows all commands and directions without difficulty   RLE Assessment   RLE Assessment WFL   LLE Assessment   LLE Assessment WFL   Coordination   Sensation WFL   Light Touch   RLE Light Touch Grossly intact   LLE Light Touch Grossly intact   Bed Mobility   Additional Comments Received OOB in chair   Transfers   Sit to Stand 6  Modified independent   Additional items Armrests   Stand to Sit 6  Modified independent   Additional items Armrests   Additional Comments OOB in chair at end of session with call bell and all needs met  Ambulation/Elevation   Gait pattern Wide SIRISHA   Gait Assistance 7  Independent   Assistive Device None   Distance 60ft   Balance   Static Sitting Normal   Dynamic Sitting Normal   Static Standing Normal   Dynamic Standing Normal   Ambulatory Normal   Endurance Deficit   Endurance Deficit Yes   Endurance Deficit Description SOB   Activity Tolerance   Activity Tolerance Treatment limited secondary to medical complications (Comment)  (SOB)   Medical Staff Made Aware Mirza RG   Nurse Made Aware Timo BROWNING   Assessment   Prognosis Good   Problem List Decreased endurance;Decreased mobility;Obesity   Assessment Patient is a 55y/o F with recent diverticulitis  Adm with sepsis and anemia due to lower GI bleed  Resides alone in a single story home with 2STE  Ind prior to admission and does not use an AD  Current medical status includes obesity, O2, SOB, low hemoglobin, A-fib, and poor endurance  Patient tolerated session well  No strength or sensory deficits  Able to perform all mobility at an ind level  Good management of O2 tubing   +SOB after amb  Educated on energy conservation and breathing techniques  Recommending that she return home with continued home P T  She is agreeable  No inpatient P T  needs at this time  D/C P T  The patient's AM-PAC Basic Mobility Inpatient Short Form Raw Score is 24, Standardized Score is 57 68  A standardized score greater than 42 9 suggests the patient may benefit from discharge to home  Please also refer to the recommendation of the Physical Therapist for safe discharge planning  Barriers to Discharge None   Goals   Patient Goals To get stronger   PT Treatment Day 0   Plan   Treatment/Interventions   (D/C P T )   PT Frequency   (D/C P T )   Recommendation   PT Discharge Recommendation Home with home health rehabilitation   PT - OK to Discharge Yes   Additional Comments To home with home P  T  when medically stable   AM-PAC Basic Mobility Inpatient   Turning in Bed Without Bedrails 4   Lying on Back to Sitting on Edge of Flat Bed 4   Moving Bed to Chair 4   Standing Up From Chair 4   Walk in Room 4   Climb 3-5 Stairs 4   Basic Mobility Inpatient Raw Score 24   Basic Mobility Standardized Score 57 68   Queta Shaver, PT            Patient Name: Dolores Ayala  MPIWH'O Date: 6/3/2021

## 2021-06-03 NOTE — PLAN OF CARE
Problem: Potential for Falls  Goal: Patient will remain free of falls  Description: INTERVENTIONS:  - Assess patient frequently for physical needs  -  Identify cognitive and physical deficits and behaviors that affect risk of falls    -  [place] fall precautions as indicated by assessment   - Educate patient/family on patient safety including physical limitations  - Instruct patient to call for assistance with activity based on assessment  - Modify environment to reduce risk of injury  - Consider OT/PT consult to assist with strengthening/mobility  Outcome: Progressing     Problem: PAIN - ADULT  Goal: Verbalizes/displays adequate comfort level or baseline comfort level  Description: Interventions:  - Encourage patient to monitor pain and request assistance  - Assess pain using appropriate pain scale  - Administer analgesics based on type and severity of pain and evaluate response  - Implement non-pharmacological measures as appropriate and evaluate response  - Consider cultural and social influences on pain and pain management  - Notify physician/advanced practitioner if interventions unsuccessful or patient reports new pain  Outcome: Progressing     Problem: INFECTION - ADULT  Goal: Absence or prevention of progression during hospitalization  Description: INTERVENTIONS:  - Assess and monitor for signs and symptoms of infection  - Monitor lab/diagnostic results  - Monitor all insertion sites, i e  indwelling lines, tubes, and drains  - Monitor endotracheal if appropriate and nasal secretions for changes in amount and color  - [place] appropriate cooling/warming therapies per order  - Administer medications as ordered  - Instruct and encourage patient and family to use good hand hygiene technique  - Identify and instruct in appropriate isolation precautions for identified infection/condition  Outcome: Progressing  Goal: Absence of fever/infection during neutropenic period  Description: INTERVENTIONS:  - Monitor WBC    Outcome: Progressing     Problem: SAFETY ADULT  Goal: Patient will remain free of falls  Description: INTERVENTIONS:  - Assess patient frequently for physical needs  -  Identify cognitive and physical deficits and behaviors that affect risk of falls    -  [place] fall precautions as indicated by assessment   - Educate patient/family on patient safety including physical limitations  - Instruct patient to call for assistance with activity based on assessment  - Modify environment to reduce risk of injury  - Consider OT/PT consult to assist with strengthening/mobility  Outcome: Progressing  Goal: Maintain or return to baseline ADL function  Description: INTERVENTIONS:  -  Assess patient's ability to carry out ADLs; assess patient's baseline for ADL function and identify physical deficits which impact ability to perform ADLs (bathing, care of mouth/teeth, toileting, grooming, dressing, etc )  - Assess/evaluate cause of self-care deficits   - Assess range of motion  - Assess patient's mobility; develop plan if impaired  - Assess patient's need for assistive devices and provide as appropriate  - Encourage maximum independence but intervene and supervise when necessary  - Involve family in performance of ADLs  - Assess for home care needs following discharge   - Consider OT consult to assist with ADL evaluation and planning for discharge  - Provide patient education as appropriate  Outcome: Progressing  Goal: Maintain or return mobility status to optimal level  Description: INTERVENTIONS:  - Assess patient's baseline mobility status (ambulation, transfers, stairs, etc )    - Identify cognitive and physical deficits and behaviors that affect mobility  - Identify mobility aids required to assist with transfers and/or ambulation (gait belt, sit-to-stand, lift, walker, cane, etc )  - [place] fall precautions as indicated by assessment  - Record patient progress and toleration of activity level on Mobility SBAR; progress patient to next Phase/Stage  - Instruct patient to call for assistance with activity based on assessment  - Consider rehabilitation consult to assist with strengthening/weightbearing, etc   Outcome: Progressing

## 2021-06-03 NOTE — OCCUPATIONAL THERAPY NOTE
Occupational Therapy Evaluation     Patient Name: Mercedes DIOP Date: 6/3/2021  Problem List  Active Problems:    Benign essential hypertension    Esophageal reflux    Type 2 diabetes mellitus, without long-term current use of insulin ([facility])    Chronic respiratory failure with hypoxia (HCC)    Atrial fibrillation, rapid (HCC)    Chronic diastolic congestive [facility] failure ([facility] )    Lower gastrointestinal bleeding    Transaminitis    Acute blood loss anemia    Past Medical History  Past Medical History:   Diagnosis Date    Alcohol abuse 2020    Alcohol abuse 2020    Anemia     Atrial fibrillation (HCC)     Cervical radiculopathy     CHF (congestive heart failure) ([facility])     Chronic low back pain     Chronic obstructive asthma ([facility] ) 2018    Community acquired pneumonia     Community acquired pneumonia 2020    Depression with anxiety 3/9/2014    Diabetes mellitus ([facility] )     Diabetes mellitus with hyperglycemia ([facility])     Elevated liver enzymes     GERD (gastroesophageal reflux disease)     Hypersomnolence 10/28/2020    Hypokalemia 2018    Paresthesia of upper extremity     Plantar fasciitis     Restless leg     Restless legs syndrome 2014    Sexual dysfunction     Sleep apnea, obstructive     Tenosynovitis of finger     Tinea corporis     Tobacco use 2018    Currently smoking 3-4 cigarettes daily    Trigger middle finger of left hand 2017    Trigger ring finger of left hand 2017    Weakness of upper extremity      Past Surgical History  Past Surgical History:   Procedure Laterality Date    ABDOMINAL SURGERY      CARPAL TUNNEL RELEASE Bilateral      SECTION      CHOLECYSTECTOMY      laparoscopic    ESOPHAGOGASTRODUODENOSCOPY N/A 12/3/2018    Procedure: ESOPHAGOGASTRODUODENOSCOPY (EGD) AND COLONOSCOPY;  Surgeon: Jie Evans MD;  Location:  MAIN OR;  Service: Gastroenterology    GASTRIC BYPASS      for morbid obesity with gilda en y    HERNIA REPAIR      HYSTERECTOMY      NJ EXCIS PRIMARY GANGLION WRIST Left 12/14/2017    Procedure: LONG FINGER GANGLION CYST EXCISION;  Surgeon: Javy Casey MD;  [facility];  Service: Orthopedics    [place] INCISE FINGER TENDON SHEATH Left 12/14/2017    Procedure: Jeppie Halo, RING, TRIGGER FINGER RELEASE;  Surgeon: Javy Casey MD;  [facility];  Service: Orthopedics    SHOULDER SURGERY               06/03/21 1029   OT Last Visit   OT Visit Date 06/03/21   Note Type   Note type Evaluation   Pain Assessment   Pain Assessment Tool Pain Assessment not indicated - pt denies pain   Pain Score No Pain   Home Living   Type of [address] One level; Able to live on main level with bedroom/bathroom; Performs ADLs on one level  (2 NICOLA)   Bathroom Shower/Tub Tub/shower unit   Bathroom Toilet Standard   Bathroom Equipment   (None per pt )   [address]   (4L O2)   Prior Function   Lives With Alone   ADL Assistance Independent   IADLs Independent   Falls in the last 6 months 0   Comments Pt states she was receiving home PT and VNA services prior to admit  Lifestyle   Autonomy Pt reports being (I) PLOF      Intrinsic Gratification None stated    Psychosocial   Psychosocial (WDL) WDL   Subjective   Subjective I want to be able to do more before becoming SOB    ADL   Eating Assistance 7  Independent   Grooming Assistance 6  Modified Independent   UB Bathing Assistance 6  Modified Independent   UB Bathing Deficit Increased time to complete   LB Bathing Assistance 6  Modified Independent   LB Bathing Deficit Increased time to complete   UB Dressing Assistance 6  Modified independent   UB Dressing Deficit Increased time to complete   LB Dressing Assistance 6  Modified independent   LB Dressing Deficit Increased time to complete   Toileting Assistance  6  Modified independent   Toileting Deficit Increased time to complete   Additional Comments Fatigues w/ minimal challenges    Bed Mobility   Additional Comments Received OOB in recliner    Transfers   Sit to Stand 6  Modified independent   Additional items Armrests   Stand to Sit 6  Modified independent   Additional items Armrests   Stand pivot 6  Modified independent   Additional items   (Without AD )   Functional Mobility   Functional Mobility 6  Modified independent   Additional Comments Without AD  Good O2 cord management    Balance   Static Sitting Good   Dynamic Sitting Fair +   Static Standing Good   Dynamic Standing Fair +   Ambulatory Fair +   Activity Tolerance   Activity Tolerance Patient limited by fatigue   Medical Staff Made Aware PT, Jessica   Nurse Made Aware RNTimo    RUMATEO Assessment   RUE Assessment WFL  (4/5)   LUE Assessment   LUE Assessment WFL  (4/5)   Hand Function   Gross Motor Coordination Functional   Fine Motor Coordination Functional   Vision-Basic Assessment   Current Vision Wears glasses all the time   Cognition   Overall Cognitive Status WFL   Arousal/Participation Cooperative   Attention Within functional limits   Orientation Level Oriented X4   Memory Within functional limits   Following Commands Follows all commands and directions without difficulty   Assessment   Assessment Pt admit 6/1/21 with severe sepsis and acute blood loss anemia  DX: Lower GI bleed  OT completed extensive review of pt's medical and social history  Pt with h/o asthma-COPD overlap syndrome, chronic respiratory failure on 4 L home O2, pulmonary HTN, type 2 DM, chronic diastolic CHF, and Afib  Prior to admit was living alone in a ranch home w/ 2 NICOLA  (I) PLOF  Pt presents to OT at functional baseline  Demonstrated ability to Independent perform self care and ambulate in room without AD w/ good O2 cord management  However, pt did fatigue very quickly and she states she would like to improve her stamina  Educated pt on [address] and encouraged increased movement t/o the day   Pt states she was receiving VNA services and home therapy prior to admission and requested to resume  This is appropriate w/ eventual transition to outpatient therapy to achieve optimal performance and tolerance due to living alone  No further skilled OT needs at this time  DC orders  However, it appears pt not yet medically stable  HGB 7 2 this morning  Episode of rapid uncontrolled A-fib this morning as well  Based on findings, pt is of high complexity  The patient's raw score on the AM-PAC Daily Activity inpatient short form is 24, standardized score is 57 54, greater than 39 4  Patients at this level are likely to benefit from discharge to home      Goals   Patient Goals To do more before becoming SOB    Recommendation   OT Discharge Recommendation Home with home health rehabilitation   AM-[facility] Daily Activity Inpatient   Lower Body Dressing 4   Bathing 4   Toileting 4   Upper Body Dressing 4   Grooming 4   Eating 4   Daily Activity Raw Score 24   Daily Activity Standardized Score (Calc for Raw Score >=11) 57 54   AM-PAC Applied Cognition Inpatient   Following a Speech/Presentation 4   Understanding Ordinary Conversation 4   Taking Medications 4   Remembering Where Things Are Placed or Put Away 4   Remembering List of 4-5 Errands 4   Taking Care of Complicated Tasks 4   Applied Cognition Raw Score 24   Applied Cognition Standardized Score 62 21      Mirza Kaiser MS, OTR/L

## 2021-06-03 NOTE — CONSULTS
Consultation - Cardiology Team Que Villeda 56 y o  female MRN: [id number]  Unit/Bed#: -01 Encounter: [id number]    Inpatient consult to Cardiology  Consult performed by: VICKY Lopez  Consult ordered by: Jayne Lopes DO          Physician Requesting Consult: Jayne Lopes DO  Reason for Consult / Principal Problem: afib/RVR    Assessment:    PAF  · Known hx of PAF; hx of MITCH/CV in 12/2018  · Maintained on flecainide 100 mg BID, Toprol XL 50 mg QD and Eliquis 5 mg BID for [address]; Eliquis on hold in setting of GI bleed and per GI they recommend to hold for at least 1-2 weeks  ·  EKG on admit was sinus tachycardia with rate of 108  · Noted to be in afib/RVR at 0545 this AM  · IV lopressor 2 5 mg x 2 dose given and she was noted to convert to NSR  · She is now in NSR at time of consult  · Would continue home meds, afib/RVR likely precipitated by anemia/GI bleed/sepsis  · Will restart her PO Toprol XL as for some reason it was not started on admit    GI bleed/acute blood loss anemia  · To ED 6/1 with c/o BRBPR and abdominal pain  · Hx of diverticulitis; concern for ischemic colitis on admit due to inflammation on CT as well as abdominal pain and patient met sepsis criteria on admit  · Eliquis placed on hold and IV PPI started with GI consult placed  · Hgb 8 5 on admit; 6 7 yesterday  · PRBC's per primary team  · GI following    Acute on chronic diastolic HF  · Home med: torsemide 40 mg BID  · Chest xray on admit reveals lungs are clear; no proBNP drawn on admit  · Patient noted to have c/o abdominal distention and SOB yesterday AM so IV lasix 40 mg BID was started and is ordered as BID dosing today as well with dose given this AM; she doesn't feel she is urinating any more than normal  · Likely iatrogenic volume overload as she was given IVF repletion per sepsis protocol on admit; anemia is likely contributing factor as well  · She continues to notes some RICCI this AM as well as mild abdominal bloating and she has +1 B/L LE edema no PE which she states is new  · Given low albumin level and the fact the patient doesn't feel she is urinating much, Will change IV lasix to IV Bumex 1 mg BID starting tonight and monitor response  · TTE from 11/2020: EF 55% with no WMA; grade 2 DD; RV systolic pressure was markedly increased, estimated peak pressure was 55 mmHg; mild MR/ AI; moderate TR  · No need for repeat echo   · I/O: total UO in 24 hrs is 4 9 L; net negative 3 8 L in 24 hrs   · Weight this AM is 290 lbs; dry weight is around 282 per patient    Pulmonary HTN  · TTE as above note peak pressure of 55 mmHg  · Likely from hx of lung disease/WILMA    Essential HTN  · 's  · Home meds: Toprol XL 50 mg QD    HLD  · Lipitor 40 mg QD    WILMA  · Compliant with CPAP    Plan/Recommendations:  · Would continue home meds for afib management with no changes at this time, afib/RVR likely precipitated by anemia/GI bleed/sepsis; would treat underlying stressors  · Agree with holding Eliquis   · Will restart her PO Toprol XL as for some reason it was not started on admit  · Given low albumin level and the fact the patient doesn't feel she is urinating much, Will change IV lasix to IV Bumex 1 mg BID starting tonight and monitor response        __________________________________________________________________________      History of Present Illness   HPI: Anastasia Fiore is a 56y o  year old female with PMH of PAF (Eliquis), chronic diastolic heart failure, WILMA, hyperlipidemia, HTN, COPD, chronic respiratory failure with hypoxia, dm 2 and morbid obesity  She follows with cardiologist Dr Jason Houston of [facility]  Patient comes to the ED 6/1/2021 secondary to noting bloody bowel movements since the night before  She also notes lower abdominal cramping and discomfort  She is currently being treated for acute GI bleed  She has a known history of PAF and was found to be in Afib RVR early this morning    She was given IV Lopressor 2 5 mg x1 dose with conversion to normal sinus rhythm and she is in normal sinus rhythm at time of consult  Cardiology is asked to evaluate patient secondary to Afib with RVR this morning as well as acute diastolic heart failure  At time of consult patient is resting comfortably in her chair only noting excessive fatigue at this time  She does note some dyspnea on exertion increased from baseline and she also states that her legs are more swollen than they usually are  EKG reviewed personally: sinus tachycardia         Telemetry reviewed personally: NSR with rates in the 70's        Review of Systems   Constitution: Positive for malaise/fatigue  Negative for chills and fever  HENT: Negative for congestion  Cardiovascular: Positive for dyspnea on exertion and leg swelling  Negative for chest pain, orthopnea and palpitations  Respiratory: Negative for cough, shortness of breath (no SOB at rest) and wheezing  Endocrine: Negative  Hematologic/Lymphatic: Negative  Skin: Negative  Musculoskeletal: Negative  Gastrointestinal: Positive for bloating  Negative for abdominal pain, nausea and vomiting  Genitourinary: Negative  Neurological: Negative for dizziness and light-headedness  Psychiatric/Behavioral: Negative  All other systems reviewed and are negative      Historical Information   Past Medical History:   Diagnosis Date    Alcohol abuse 5/21/2020    Alcohol abuse 5/21/2020    Anemia     Atrial fibrillation (HCC)     Cervical radiculopathy     CHF (congestive heart failure) (HCC)     Chronic low back pain     Chronic obstructive asthma ([facility] ) 2/20/2018    Community acquired pneumonia     Community acquired pneumonia 5/21/2020    Depression with anxiety 3/9/2014    Diabetes mellitus ([facility] )     Diabetes mellitus with hyperglycemia (HCC)     Elevated liver enzymes     GERD (gastroesophageal reflux disease)     Hypersomnolence 10/28/2020    Hypokalemia 12/4/2018    Paresthesia of upper extremity     Plantar fasciitis     Restless leg     Restless legs syndrome 2014    Sexual dysfunction     Sleep apnea, obstructive     Tenosynovitis of finger     Tinea corporis     Tobacco use 2018    Currently smoking 3-4 cigarettes daily    Trigger middle finger of left hand 2017    Trigger ring finger of left hand 2017    Weakness of upper extremity      Past Surgical History:   Procedure Laterality Date    ABDOMINAL SURGERY      CARPAL TUNNEL RELEASE Bilateral      SECTION      CHOLECYSTECTOMY      laparoscopic    ESOPHAGOGASTRODUODENOSCOPY N/A 12/3/2018    Procedure: ESOPHAGOGASTRODUODENOSCOPY (EGD) AND COLONOSCOPY;  Surgeon: Suresh Haynes MD;  Location[facility];  Service: Gastroenterology    GASTRIC BYPASS      for morbid obesity with gilda en y   [address] PRIMARY GANGLION WRIST Left 2017    Procedure: LONG FINGER GANGLION CYST EXCISION;  Surgeon: Erica Rust MD;  [facility];  Service: Orthopedics    OH INCISE FINGER TENDON SHEATH Left 2017    Procedure: Sharmaine Ramp, RING, TRIGGER FINGER RELEASE;  Surgeon: Erica Rust MD;  [facility];  Service: Orthopedics    SHOULDER SURGERY       Social History     Substance and Sexual Activity   Alcohol Use Yes    Alcohol/week: 20 0 standard drinks    Types: 20 Cans of beer per week    Frequency: 4 or more times a week    Drinks per session: 3 or 4    Binge frequency: Never    Comment: about 6 coors light every night, stopped 3 weeks ago      Social History     Substance and Sexual Activity   Drug Use No     Social History     Tobacco Use   Smoking Status Former Smoker    Packs/day: 0 25    Years: 29 00    Pack years: 7 25    Types: Cigarettes    Start date: 200    Quit date: 12/10/2019    Years since quittin 4   Smokeless Tobacco Never Used     Family History:   Family History   Problem Relation Age of Onset    Alzheimer's disease Mother     Atrial fibrillation Mother     Dementia Mother     Heart disease Mother         heart problem    Seizures Mother     Parkinsonism Father     Arthritis Father     Atrial fibrillation Father     Substance Abuse Neg Hx         mother,father    Mental illness Neg Hx         mother,father       Meds/Allergies   current meds:   Current Facility-Administered Medications   Medication Dose Route Frequency    acetaminophen (TYLENOL) tablet 650 mg  650 mg Oral Q6H PRN    albuterol (PROVENTIL HFA,VENTOLIN HFA) inhaler 2 puff  2 puff Inhalation Q4H PRN    atorvastatin (LIPITOR) tablet 40 mg  40 mg Oral Daily With Dinner    citalopram (CeleXA) tablet 20 mg  20 mg Oral Daily    flecainide (TAMBOCOR) tablet 100 mg  100 mg Oral Q12H [address]    fluticasone (FLONASE) 50 mcg/act nasal spray 1 spray  1 spray Nasal BID    fluticasone (FLOVENT HFA) 220 mcg/act inhaler 2 puff  2 puff Inhalation BID    furosemide (LASIX) injection 40 mg  40 mg Intravenous BID (diuretic)    insulin glargine (LANTUS) subcutaneous injection 10 Units 0 1 mL  10 Units Subcutaneous HS    insulin lispro (HumaLOG) 100 units/mL subcutaneous injection 1-6 Units  1-6 Units Subcutaneous TID AC    insulin lispro (HumaLOG) 100 units/mL subcutaneous injection 1-6 Units  1-6 Units Subcutaneous HS    insulin lispro (HumaLOG) 100 units/mL subcutaneous injection 5 Units  5 Units Subcutaneous TID With Meals    iron sucrose (VENOFER) 300 mg in sodium chloride 0 9 % 250 mL IVPB  300 mg Intravenous Daily    levalbuterol (XOPENEX) inhalation solution 0 63 mg  0 63 mg Nebulization BID    loratadine (CLARITIN) tablet 10 mg  10 mg Oral Daily    LORazepam (ATIVAN) tablet 1 mg  1 mg Oral HS    metFORMIN (GLUCOPHAGE) tablet 500 mg  500 mg Oral BID With Meals    metoprolol (LOPRESSOR) injection 2 5 mg  2 5 mg Intravenous Q6H PRN    montelukast (SINGULAIR) tablet 10 mg  10 mg Oral HS    pantoprazole (PROTONIX) injection 40 mg  40 mg Intravenous Q12H [address]    piperacillin-tazobactam (ZOSYN) IVPB 3 375 g  3 375 g Intravenous Q6H    traZODone (DESYREL) tablet 150 mg  150 mg Oral HS          Allergies   Allergen Reactions    Iron Dextran Swelling    Januvia [Sitagliptin] Shortness Of Breath    Jardiance [Empagliflozin] Shortness Of Breath    Clonazepam Other (See Comments)     Unknown reaction    Codeine Itching and Other (See Comments)     itch;mary kay morphine,takes ultram @home    Adhesive [Medical Tape] Itching     itching    Effexor [Venlafaxine] Itching    Lactose - Food Allergy GI Intolerance    Oxycodone-Acetaminophen Anxiety    Oxycodone-Acetaminophen Itching and Rash     Other reaction(s): Other (See Comments)  (PERCOCET) MILD RASH, not allergic to Acetaminophen        Objective   Vitals: Blood pressure 111/55, pulse 72, temperature 97 6 °F (36 4 °C), resp  rate 18, height 5' 3" (1 6 m), weight 132 kg (290 lb 6 4 oz), SpO2 99 %, not currently breastfeeding ,     Body mass index is 51 44 kg/m²  ,     Systolic (37JGG), TJA:423 , Min:111 , KRYS:534     Diastolic (63MOL), BDZ:95, Min:55, Max:68    Wt Readings from Last 3 Encounters:   06/03/21 132 kg (290 lb 6 4 oz)   05/13/21 127 kg (281 lb)   05/02/21 129 kg (285 lb 6 4 oz)      Lab Results   Component Value Date    CREATININE 0 53 (L) 06/03/2021    CREATININE 0 76 06/01/2021    CREATININE 0 65 05/24/2021             Intake/Output Summary (Last 24 hours) at 6/3/2021 0945  Last data filed at 6/3/2021 0701  Gross per 24 hour   Intake 1768 ml   Output 5251 ml   Net -3483 ml     Weight (last 2 days)     Date/Time   Weight    06/03/21 0504   132 (290 4)    06/02/21 0600   133 (293 4)    06/01/21 19:34:02   135 (296 74)    06/01/21 1454   135 (297 62)    06/01/21 0943   129 (285)            Invasive Devices     Peripheral Intravenous Line            Peripheral IV 06/02/21 Left Hand less than 1 day                  Physical Exam  Vitals signs reviewed     Constitutional: General: She is not in acute distress  Appearance: She is obese  HENT:      Head: Normocephalic and atraumatic  Mouth/Throat:      Mouth: Mucous membranes are moist    Neck:      Musculoskeletal: Normal range of motion and neck supple  Cardiovascular:      Rate and Rhythm: Normal rate and regular rhythm  Heart sounds: Normal heart sounds, S1 normal and S2 normal  No murmur  Pulmonary:      Effort: Pulmonary effort is normal  No respiratory distress  Breath sounds: Normal breath sounds  Abdominal:      General: Bowel sounds are normal  There is no distension  Palpations: Abdomen is soft  Musculoskeletal: Normal range of motion  Right lower le+ Pitting Edema present  Left lower le+ Pitting Edema present  Skin:     General: Skin is warm and dry  Neurological:      Mental Status: She is alert and oriented to person, place, and time  Psychiatric:         Mood and Affect: Mood normal            LABORATORY RESULTS:      CBC with diff:   Results from last 7 days   Lab Units 21  0755 21  0501 21  0146 21  1449 21  0759 21  0027  21  1421 21  1006   WBC Thousand/uL  --  7 25  --   --   --   --   --   --   --  15 85*   HEMOGLOBIN g/dL 7 1* 7 2* 7 1* 7 7* 7 9* 8 0* 6 7*   < > 7 2* 8 5*   HEMATOCRIT %  --  23 5*  --   --   --   --   --   --  24 4* 28 7*   MCV fL  --  77*  --   --   --   --   --   --   --  73*   PLATELETS Thousands/uL  --  229  --   --   --   --   --   --   --  288   MCH pg  --  23 7*  --   --   --   --   --   --   --  21 7*   MCHC g/dL  --  30 6*  --   --   --   --   --   --   --  29 6*   RDW %  --  19 9*  --   --   --   --   --   --   --  18 4*   MPV fL  --  11 2  --   --   --   --   --   --   --  11 9   NRBC AUTO /100 WBCs  --  0  --   --   --   --   --   --   --  0    < > = values in this interval not displayed         CMP:  Results from last 7 days   Lab Units 21  0501 21  1009 POTASSIUM mmol/L 3 7 4 2   CHLORIDE mmol/L 101 93*   CO2 mmol/L 33* 33*   BUN mg/dL 3* 10   CREATININE mg/dL 0 53* 0 76   CALCIUM mg/dL 7 8* 8 6   AST U/L 17 51*   ALT U/L 45 91*   ALK PHOS U/L 137* 176*   EGFR ml/min/1 73sq m 102 84       BMP:  Results from last 7 days   Lab Units 21  0501 21  1006   POTASSIUM mmol/L 3 7 4 2   CHLORIDE mmol/L 101 93*   CO2 mmol/L 33* 33*   BUN mg/dL 3* 10   CREATININE mg/dL 0 53* 0 76   CALCIUM mg/dL 7 8* 8 6          Lab Results   Component Value Date    NTBNP 131 (H) 04/15/2021    NTBNP 337 (H) 2021    NTBNP 269 (H) 2020                   Results from last 7 days   Lab Units 21  0329   HEMOGLOBIN A1C % 9 6*              Results from last 7 days   Lab Units 21  1006   INR  1 15     Lipid Profile:   Lab Results   Component Value Date    CHOL 150 2017    CHOL 134 2017    CHOL 168 2016     Lab Results   Component Value Date    HDL 62 10/17/2019    HDL 65 (H) 2018    HDL 73 2017     Lab Results   Component Value Date    LDLCALC 57 10/17/2019    LDLCALC 61 2018    LDLCALC 61 2017     Lab Results   Component Value Date    TRIG 100 10/17/2019    TRIG 54 2018    TRIG 81 2017         Cardiac testing:   Results for orders placed during the hospital encounter of 20   Echo complete with contrast if indicated    Narrative [address] [place] [address]    Transthoracic Echocardiogram  2D, M-mode, Doppler, and Color Doppler    Study date:  2020    Patient: Shayan Varma  MR number: [id number]  Account number: [de-identified]  : 1960  Age: 62 years  Gender: Female  Status: Inpatient  Location: [address] [place] [address]  Height: 63 in  Weight: 277 4 lb  BP: 128/ 62 mmHg    Indications: Dyspnea, Shortness of Breath, Wheezing    Diagnoses: R06 00 - Dyspnea, unspecified, R06 02 - Shortness of breath, R06 2 - Wheezing    Sonographer:  Flavio Goldstein, [facility]  Primary Physician:  Titi Min MD  Referring Physician:  Lenord Claude, DO  Group:  [facility]  Interpreting Physician:  Bernadette Mcgee MD    SUMMARY    LEFT VENTRICLE:  Systolic function was normal by visual assessment  Ejection fraction was estimated to be 55 %  There were no regional wall motion abnormalities  Features were consistent with a pseudonormal left ventricular filling pattern, with concomitant abnormal relaxation and increased filling pressure (grade 2 diastolic dysfunction)  RIGHT VENTRICLE:  Systolic pressure was moderately to markedly increased  Estimated peak pressure was 55 mmHg  MITRAL VALVE:  There was mild regurgitation  AORTIC VALVE:  There was mild regurgitation  TRICUSPID VALVE:  There was moderate regurgitation  IVC, HEPATIC VEINS:  The inferior vena cava was mildly dilated  HISTORY: PRIOR HISTORY: Atrial Fibrillation, Congestive Heart Failure, Diabetes    PROCEDURE: The study was performed in the [address] [place] [address]  This was a routine study  The transthoracic approach was used  The study included complete 2D imaging, M-mode, complete spectral Doppler, and color Doppler  The heart rate was  75 bpm, at the start of the study  Images were obtained from the parasternal, apical, subcostal, and suprasternal notch acoustic windows  Echocardiographic views were limited due to decreased penetration and lung interference  Image  quality was adequate  LEFT VENTRICLE: Size was normal  Systolic function was normal by visual assessment  Ejection fraction was estimated to be 55 %  There were no regional wall motion abnormalities  Wall thickness was normal  DOPPLER: The ratio of early  ventricular filling to atrial contraction velocities was within the normal range   Features were consistent with a pseudonormal left ventricular filling pattern, with concomitant abnormal relaxation and increased filling pressure (grade 2  diastolic dysfunction)  RIGHT VENTRICLE: The size was normal  Systolic function was normal  DOPPLER: Systolic pressure was moderately to markedly increased  Estimated peak pressure was 55 mmHg  LEFT ATRIUM: Size was normal     RIGHT ATRIUM: Size was normal     MITRAL VALVE: Valve structure was normal  There was mild calcification  There was normal leaflet separation  DOPPLER: The transmitral velocity was within the normal range  There was no evidence for stenosis  There was mild regurgitation  AORTIC VALVE: The valve was trileaflet  Leaflets exhibited normal thickness and normal cuspal separation  DOPPLER: Transaortic velocity was within the normal range  There was no evidence for stenosis  There was mild regurgitation  TRICUSPID VALVE: The valve structure was normal  There was normal leaflet separation  DOPPLER: The transtricuspid velocity was within the normal range  There was no evidence for stenosis  There was moderate regurgitation  PULMONIC VALVE: Leaflets exhibited normal thickness, no calcification, and normal cuspal separation  DOPPLER: The transpulmonic velocity was within the normal range  There was no regurgitation  PERICARDIUM: There was no pericardial effusion  AORTA: The root exhibited normal size  SYSTEMIC VEINS: IVC: The inferior vena cava was mildly dilated      SYSTEM MEASUREMENT TABLES    2D  %FS: 31 14 %  Ao Diam: 2 84 cm  Ao asc: 3 42 cm  EDV(Teich): 118 66 ml  EF(Teich): 58 64 %  ESV(Teich): 49 08 ml  IVC: 24 43 mm  IVSd: 1 02 cm  LA Area: 18 39 cm2  LA Diam: 3 71 cm  LVEDV MOD A4C: 46 44 ml  LVEF MOD A4C: 68 18 %  LVESV MOD A4C: 14 78 ml  LVIDd: 5 01 cm  LVIDs: 3 45 cm  LVLd A4C: 5 93 cm  LVLs A4C: 5 08 cm  LVOT Diam: 1 99 cm  LVPWd: 1 cm  RA Area: 16 43 cm2  RVIDd: 3 71 cm  SV MOD A4C: 31 66 ml  SV(Teich): 69 58 ml    CW  TR Vmax: 3 54 m/s  TR maxP 18 mmHg    MM  TAPSE: 1 75 cm    PW  E' Sept: 0 07 m/s  E/E' Sept: 16 83  MV A [place]rick: 0 74 m/s  MV Dec [place]: 6 49 m/s2  MV DecT: 174 84 ms  MV E [place]: 1 13 m/s  MV E/A Ratio: 1 53  MV PHT: 50 7 ms  MVA By PHT: 4 34 cm2    [facility] Accredited Echocardiography Laboratory    Prepared and electronically signed by    Gerald Guerrero MD  Signed [id number] 12:31:59       Results for orders placed during the hospital encounter of 18   MITCH    Narrative [address]  [place], [place] [address]    Transesophageal Echocardiogram  2D and Color Doppler    Study date:  05-Dec-2018    Patient: Bryan Francisco  MR number: [id number]  Account number: [de-identified]  : 1960  Age: 59 years  Gender: Female  Status: Inpatient  Location: Echo lab  Height: 63 in  Weight: 252 lb  BP: 149/ 77 mmHg    Indications: Atrial flutter  Diagnoses: I48 1 - Atrial flutter    Sonographer:  Velma MIKE, [facility]  Primary Physician:  Tere Mccurdy MD  Referring Physician:  Gerald Guerrero MD  [facility]  [facility]  Interpreting Physician:  Gerald Guerrero MD    SUMMARY    LEFT VENTRICLE:  Systolic function was normal by visual assessment  Ejection fraction was estimated to be 55 %  There were no regional wall motion abnormalities  LEFT ATRIUM:  The atrium was mildly to moderately dilated  LEFT ATRIAL APPENDAGE:  The appendage was moderately dilated  The function was moderately reduced (moderately reduced emptying velocity)  No thrombus was identified  ATRIAL SEPTUM:  No defect or patent foramen ovale was identified  RIGHT ATRIUM:  The atrium was mildly dilated  MITRAL VALVE:  There was mild regurgitation  AORTIC VALVE:  There was moderate regurgitation  TRICUSPID VALVE:  There was moderate regurgitation  HISTORY: PRIOR HISTORY: Respiratory failure, Hypoxia, Asthma, Acute CHF, Diabetes, Sleep apnea, Pneumonia  PROCEDURE: The procedure was performed in the echo lab  This was a routine study   The risks and alternatives of the procedure were explained to the patient and informed consent was obtained  The transesophageal approach was used  The study  included complete 2D imaging and color Doppler  by the attending cardiologist  Image quality was adequate  There were no complications during the procedure  MEDICATIONS: Anesthesia  LEFT VENTRICLE: Size was normal  Systolic function was normal by visual assessment  Ejection fraction was estimated to be 55 %  There were no regional wall motion abnormalities  Wall thickness was normal     RIGHT VENTRICLE: The size was normal  Systolic function was normal     LEFT ATRIUM: The atrium was mildly to moderately dilated  APPENDAGE: The appendage was moderately dilated  No thrombus was identified  DOPPLER: The function was moderately reduced (moderately reduced emptying velocity)  ATRIAL SEPTUM: No defect or patent foramen ovale was identified  RIGHT ATRIUM: The atrium was mildly dilated  MITRAL VALVE: Valve structure was normal  There was normal leaflet separation  There was no echocardiographic evidence of vegetation  DOPPLER: There was mild regurgitation  AORTIC VALVE: The valve was trileaflet  Leaflets exhibited normal thickness and normal cuspal separation  There was no echocardiographic evidence of vegetation  DOPPLER: There was moderate regurgitation  TRICUSPID VALVE: The valve structure was normal  There was normal leaflet separation  There was no echocardiographic evidence of vegetation  DOPPLER: There was moderate regurgitation  PULMONIC VALVE: Leaflets exhibited normal thickness, no calcification, and normal cuspal separation  There was no echocardiographic evidence of vegetation  PERICARDIUM: There was no pericardial effusion  AORTA: The root exhibited normal size  There was no atheroma  There was no evidence for dissection  There was no evidence for aneurysm      [address] Accredited Echocardiography Laboratory    Prepared and electronically signed by    Beka Wheeler MD  Signed [id number] 15:38:34       No procedure found  No results found for this or any previous visit  Imaging: I have personally reviewed pertinent reports  Xr Chest Portable    Result Date: 6/2/2021  Narrative: CHEST INDICATION:   severe sepsis  COMPARISON:  4/15/2021 EXAM PERFORMED/VIEWS:  XR CHEST PORTABLE FINDINGS: Cardiomediastinal silhouette appears unremarkable  The lungs are clear  No pneumothorax or pleural effusion  Osseous structures appear within normal limits for patient age  Impression: No acute cardiopulmonary disease  Workstation performed: [id number]     Ct Abdomen Pelvis With Contrast    Result Date: 6/1/2021  Narrative: CT ABDOMEN AND PELVIS WITH IV CONTRAST INDICATION:   Abdominal pain, acute, nonlocalized abdominal pain, BRBPR  At least 8 episodes of bright red rectal bleeding since last night  Suprapubic abdominal pain WBCs ~ 16 Lactate = 4 7 Hemoglobin = 8 5 COMPARISON:  CT 5/24/21, 5/2/21 TECHNIQUE:  CT examination of the abdomen and pelvis was performed  Axial, sagittal, and coronal 2D reformatted images were created from the source data and submitted for interpretation  Radiation dose length product (DLP) for this visit:  1337 2 mGy-cm   This examination, like all CT scans performed in the [facility], was performed utilizing techniques to minimize radiation dose exposure, including the use of iterative  reconstruction and automated exposure control  IV Contrast:  100 mL of iohexol (OMNIPAQUE) Enteric Contrast:  Enteric contrast was not administered  FINDINGS: ABDOMEN LOWER CHEST:  No clinically significant abnormality identified in the visualized lower chest  LIVER/BILIARY TREE:  Decreased parenchymal density, new from prior GALLBLADDER:  Postcholecystectomy SPLEEN:  Unremarkable  PANCREAS:  Unremarkable  ADRENAL GLANDS:  Unremarkable  KIDNEYS/URETERS:  Unremarkable  No hydronephrosis   STOMACH AND BOWEL:  There is mild residual wall thickening in the loop of sigmoid colon which was inflamed on the prior study, this is best appreciated on series 2/63  Interval decrease in the amount of pericolonic fat stranding  The more proximal loop  of sigmoid colon which is inflamed on the 5/2/21 CT appears within normal limits  There is a thin, linear soft tissue density connecting this loop of sigmoid colon, with the left ovary, seen on series 602/100 APPENDIX:  A normal appendix was visualized  ABDOMINOPELVIC CAVITY:  No ascites  No pneumoperitoneum  No lymphadenopathy  VESSELS:  Unremarkable for patient's age  PELVIS REPRODUCTIVE ORGANS:  Surgical changes of prior hysterectomy  URINARY BLADDER:  Unremarkable  ABDOMINAL WALL/INGUINAL REGIONS:  Unremarkable  OSSEOUS STRUCTURES:  No acute fracture or destructive osseous lesion  Impression: 1  Interval improvement in inflammatory changes surrounding the previously inflamed loop of sigmoid colon, with mild residual wall thickening  2   Linear soft tissue density connecting this loop of sigmoid colon with the left ovary, may represent sinus tract  Since patient is status post hysterectomy, if present, a sinus tract would present with abscess formation as opposed to vaginal discharge  Currently, no abscess present  If patient develops constitutional symptoms following completion of diverticulitis treatment, consider follow-up CT abdomen and pelvis to evaluate for abscess  3   Hepatic steatosis Workstation performed: [id number]     Ct Abdomen Pelvis With Contrast    Result Date: 5/24/2021  Narrative: CT ABDOMEN AND PELVIS WITH IV CONTRAST INDICATION:   LLQ abdominal pain, diverticulitis suspected Lower abdominal pain  COMPARISON:  CT scan 5/2/2021  TECHNIQUE:  CT examination of the abdomen and pelvis was performed  Axial, sagittal, and coronal 2D reformatted images were created from the source data and submitted for interpretation  Radiation dose length product (DLP) for this visit:  1428 mGy-cm     This examination, like all CT scans performed in the [facility], was performed utilizing techniques to minimize radiation dose exposure, including the use of iterative reconstruction and automated exposure control  IV Contrast:  100 mL of iohexol (OMNIPAQUE) Enteric Contrast:  Enteric contrast was not administered  FINDINGS: ABDOMEN LOWER CHEST:  Stable mosaic pattern of lung attenuation  Stable benign subpleural nodular density along the anterior aspect of the left lower lobe measuring 7 mm  LIVER/BILIARY TREE:  Enlarged fatty liver unchanged  GALLBLADDER:  Removed  SPLEEN:  Unremarkable  PANCREAS:  Partially fatty replaced  ADRENAL GLANDS:  Unremarkable  KIDNEYS/URETERS:  Unremarkable  No hydronephrosis  STOMACH AND BOWEL:  Status post gastric bypass surgery  Stable polyps along the greater curvature of the excluded stomach the largest measuring 1 cm on image 2/21  Similar to prior study, there is sigmoid diverticulosis with wall thickening and pericolonic inflammation as seen on image 2/62 consistent with acute sigmoid diverticulitis  The location is slightly more proximal compared to previous exam   No perforation or abscess  APPENDIX:  A normal appendix was visualized  ABDOMINOPELVIC CAVITY:  No ascites  No pneumoperitoneum  No lymphadenopathy  VESSELS:  Unremarkable for patient's age  PELVIS REPRODUCTIVE ORGANS:  Surgical changes of prior hysterectomy  URINARY BLADDER:  Unremarkable  ABDOMINAL WALL/INGUINAL REGIONS:  Unremarkable  OSSEOUS STRUCTURES:  No acute fracture or destructive osseous lesion  Impression: Similar to prior study, there is sigmoid diverticulosis with wall thickening and pericolonic inflammation as seen on image 2/62 consistent with acute sigmoid diverticulitis  The location is slightly more proximal compared to previous exam   No perforation or abscess  Status post gastric bypass surgery  Stable polyps along the greater curvature of the excluded stomach the largest measuring 1 cm on image 2/21  Enlarged fatty liver unchanged  Workstation performed: [id number]           Counseling / Coordination of Care  Total floor / unit time spent today 45 minutes  Greater than 50% of total time was spent with the patient and / or family counseling and / or coordination of care  A description of the counseling / coordination of care: Review of history, current assessment, development of a plan  Code Status: Level 1 - Full Code    ** Please Note: Dragon 360 Dictation voice to text software may have been used in the creation of this document   **

## 2021-06-03 NOTE — PROGRESS NOTES
Progress Note - Enio Husbands 56 y o  female MRN: [id number]    Unit/Bed#: -01 Encounter: [id number]    Assessment and Plan:    75-year-old female with asthma-COPD overlap syndrome, chronic respiratory failure on 4 L home O2, pulmonary HTN, type 2 DM, chronic diastolic CHF, AFib on Eliquis, and recent diverticulitis admitted with severe sepsis and acute blood loss anemia due to lower GI bleed     1) Lower GI bleeding with acute blood loss anemia:  Differential diagnosis includes ischemic colitis vs segmental colitis associated with diverticular disease (SCAD)  Less likely diverticular bleeding  She had some additional episodes of bleeding yesterday, but nothing since 3 PM  Hemoglobin trending downward 8 -> 7 1  Vital signs stable       -Agree with transfusion of 1 unit PRBCs  -Agree with IV iron infusion  -Monitor hemoglobin and stool color  -Continue clear liquid diet  -Continue to hold Eliquis  -We will hold off on colonoscopy as bleeding is subsiding  -Would recommend repeat CT scan likely on Monday, or sooner if increasing pain, to reassess inflammatory changes      2) Diverticulitis: She was completing outpatient antibiotics prior to this admission  CT abdomen pelvis from 6/1 shows interval improvement in inflammatory changes of sigmoid colon with mild residual bowel thickening      -Continue IV Zosyn  -Appreciate recommendations from Surgery  -She will need colonoscopy in 4-6 weeks      3) Rapid Afib: Continue to hold Eliquis, risk of anticoagulation outweighs benefits in the setting of active GI bleeding      Subjective:     Patient seen and examined at bedside  She denies abdominal pain at rest, but does still reports discomfort with palpation her lower abdomen  She is tolerating liquids  She had 2-3 bloody BMs yesterday afternoon, but nothing since 3:00 p m  Objective:     Vitals: Blood pressure 111/55, pulse 72, temperature 97 6 °F (36 4 °C), resp   rate 18, height 5' 3" (1 6 m), weight 132 kg (290 lb 6 4 oz), SpO2 99 %, not currently breastfeeding  ,Body mass index is 51 44 kg/m²  Intake/Output Summary (Last 24 hours) at 6/3/2021 1104  Last data filed at 6/3/2021 1021  Gross per 24 hour   Intake 1448 ml   Output 5451 ml   Net -4003 ml       Physical Exam:     General Appearance: Alert, obese female, appears stated age and cooperative  HEENT: Nasal cannula  Lungs: Decreased breath sounds bilaterally  Heart: Regular rate and rhythm  Abdomen: Soft, mild lower abdominal tenderness to palpation, non-distended; bowel sounds normal  Extremities: No cyanosis, edema    Invasive Devices     Peripheral Intravenous Line            Peripheral IV 06/02/21 Left Hand less than 1 day                Lab Results:  Results from last 7 days   Lab Units 06/03/21  0755 06/03/21  0501   WBC Thousand/uL  --  7 25   HEMOGLOBIN g/dL 7 1* 7 2*   HEMATOCRIT %  --  23 5*   PLATELETS Thousands/uL  --  229   NEUTROS PCT %  --  65   LYMPHS PCT %  --  20   MONOS PCT %  --  8   EOS PCT %  --  5     Results from last 7 days   Lab Units 06/03/21  0501   POTASSIUM mmol/L 3 7   CHLORIDE mmol/L 101   CO2 mmol/L 33*   BUN mg/dL 3*   CREATININE mg/dL 0 53*   CALCIUM mg/dL 7 8*   ALK PHOS U/L 137*   ALT U/L 45   AST U/L 17     Results from last 7 days   Lab Units 06/01/21  1006   INR  1 15     Results from last 7 days   Lab Units 06/01/21  1006   LIPASE u/L 72*       Imaging Studies: I have personally reviewed pertinent imaging studies  Xr Chest Portable    Result Date: 6/2/2021  Impression: No acute cardiopulmonary disease  Workstation performed: [id number]     Ct Abdomen Pelvis With Contrast    Result Date: 6/1/2021  Impression: 1  Interval improvement in inflammatory changes surrounding the previously inflamed loop of sigmoid colon, with mild residual wall thickening  2   Linear soft tissue density connecting this loop of sigmoid colon with the left ovary, may represent sinus tract    Since patient is status post hysterectomy, if present, a sinus tract would present with abscess formation as opposed to vaginal discharge  Currently, no abscess present  If patient develops constitutional symptoms following completion of diverticulitis treatment, consider follow-up CT abdomen and pelvis to evaluate for abscess   3   Hepatic steatosis Workstation performed: [id number]

## 2021-06-04 LAB
ABO GROUP BLD BPU: NORMAL
ALBUMIN SERPL BCP-MCNC: 2.8 G/DL (ref 3.5–5)
ALP SERPL-CCNC: 139 U/L (ref 46–116)
ALT SERPL W P-5'-P-CCNC: 47 U/L (ref 12–78)
ANION GAP SERPL CALCULATED.3IONS-SCNC: 3 MMOL/L (ref 4–13)
AST SERPL W P-5'-P-CCNC: 25 U/L (ref 5–45)
ATRIAL RATE: 72 BPM
BILIRUB SERPL-MCNC: 0.2 MG/DL (ref 0.2–1)
BPU ID: NORMAL
BUN SERPL-MCNC: 4 MG/DL (ref 5–25)
CALCIUM ALBUM COR SERPL-MCNC: 9.2 MG/DL (ref 8.3–10.1)
CALCIUM SERPL-MCNC: 8.2 MG/DL (ref 8.3–10.1)
CHLORIDE SERPL-SCNC: 104 MMOL/L (ref 100–108)
CO2 SERPL-SCNC: 35 MMOL/L (ref 21–32)
CREAT SERPL-MCNC: 0.51 MG/DL (ref 0.6–1.3)
CROSSMATCH: NORMAL
ERYTHROCYTE [DISTWIDTH] IN BLOOD BY AUTOMATED COUNT: 20.4 % (ref 11.6–15.1)
GFR SERPL CREATININE-BSD FRML MDRD: 103 ML/MIN/1.73SQ M
GLUCOSE SERPL-MCNC: 189 MG/DL (ref 65–140)
GLUCOSE SERPL-MCNC: 222 MG/DL (ref 65–140)
GLUCOSE SERPL-MCNC: 240 MG/DL (ref 65–140)
GLUCOSE SERPL-MCNC: 254 MG/DL (ref 65–140)
GLUCOSE SERPL-MCNC: 326 MG/DL (ref 65–140)
HCT VFR BLD AUTO: 26.2 % (ref 34.8–46.1)
HGB BLD-MCNC: 8.2 G/DL (ref 11.5–15.4)
HGB BLD-MCNC: 9.3 G/DL (ref 11.5–15.4)
MCH RBC QN AUTO: 24.8 PG (ref 26.8–34.3)
MCHC RBC AUTO-ENTMCNC: 31.3 G/DL (ref 31.4–37.4)
MCV RBC AUTO: 79 FL (ref 82–98)
P AXIS: 82 DEGREES
PLATELET # BLD AUTO: 265 THOUSANDS/UL (ref 149–390)
PMV BLD AUTO: 11.2 FL (ref 8.9–12.7)
POTASSIUM SERPL-SCNC: 3.9 MMOL/L (ref 3.5–5.3)
PR INTERVAL: 192 MS
PROT SERPL-MCNC: 5.7 G/DL (ref 6.4–8.2)
QRS AXIS: 66 DEGREES
QRSD INTERVAL: 80 MS
QT INTERVAL: 406 MS
QTC INTERVAL: 444 MS
RBC # BLD AUTO: 3.3 MILLION/UL (ref 3.81–5.12)
SODIUM SERPL-SCNC: 142 MMOL/L (ref 136–145)
T WAVE AXIS: 72 DEGREES
UNIT DISPENSE STATUS: NORMAL
UNIT PRODUCT CODE: NORMAL
UNIT RH: NORMAL
VENTRICULAR RATE: 72 BPM
WBC # BLD AUTO: 9.6 THOUSAND/UL (ref 4.31–10.16)

## 2021-06-04 PROCEDURE — 94640 AIRWAY INHALATION TREATMENT: CPT

## 2021-06-04 PROCEDURE — 99233 SBSQ HOSP IP/OBS HIGH 50: CPT | Performed by: INTERNAL MEDICINE

## 2021-06-04 PROCEDURE — 80053 COMPREHEN METABOLIC PANEL: CPT | Performed by: INTERNAL MEDICINE

## 2021-06-04 PROCEDURE — 99232 SBSQ HOSP IP/OBS MODERATE 35: CPT | Performed by: INTERNAL MEDICINE

## 2021-06-04 PROCEDURE — 85027 COMPLETE CBC AUTOMATED: CPT | Performed by: INTERNAL MEDICINE

## 2021-06-04 PROCEDURE — 93010 ELECTROCARDIOGRAM REPORT: CPT | Performed by: INTERNAL MEDICINE

## 2021-06-04 PROCEDURE — C9113 INJ PANTOPRAZOLE SODIUM, VIA: HCPCS | Performed by: PHYSICIAN ASSISTANT

## 2021-06-04 PROCEDURE — 94760 N-INVAS EAR/PLS OXIMETRY 1: CPT

## 2021-06-04 PROCEDURE — 85018 HEMOGLOBIN: CPT | Performed by: INTERNAL MEDICINE

## 2021-06-04 PROCEDURE — 82948 REAGENT STRIP/BLOOD GLUCOSE: CPT

## 2021-06-04 PROCEDURE — 93005 ELECTROCARDIOGRAM TRACING: CPT

## 2021-06-04 PROCEDURE — 94660 CPAP INITIATION&MGMT: CPT

## 2021-06-04 RX ORDER — TORSEMIDE 20 MG/1
20 TABLET ORAL 2 TIMES DAILY
Status: DISCONTINUED | OUTPATIENT
Start: 2021-06-04 | End: 2021-06-07 | Stop reason: HOSPADM

## 2021-06-04 RX ORDER — DIPHENHYDRAMINE HCL 25 MG
50 TABLET ORAL SEE ADMIN INSTRUCTIONS
Status: DISCONTINUED | OUTPATIENT
Start: 2021-06-04 | End: 2021-06-07 | Stop reason: HOSPADM

## 2021-06-04 RX ORDER — PANTOPRAZOLE SODIUM 40 MG/1
40 TABLET, DELAYED RELEASE ORAL
Status: DISCONTINUED | OUTPATIENT
Start: 2021-06-04 | End: 2021-06-07 | Stop reason: HOSPADM

## 2021-06-04 RX ORDER — GLIMEPIRIDE 2 MG/1
4 TABLET ORAL
Status: DISCONTINUED | OUTPATIENT
Start: 2021-06-05 | End: 2021-06-07

## 2021-06-04 RX ADMIN — MONTELUKAST SODIUM 10 MG: 10 TABLET, FILM COATED ORAL at 21:01

## 2021-06-04 RX ADMIN — LEVALBUTEROL HYDROCHLORIDE 1.25 MG: 1.25 SOLUTION, CONCENTRATE RESPIRATORY (INHALATION) at 20:37

## 2021-06-04 RX ADMIN — METOPROLOL SUCCINATE 50 MG: 50 TABLET, EXTENDED RELEASE ORAL at 08:25

## 2021-06-04 RX ADMIN — PANTOPRAZOLE SODIUM 40 MG: 40 INJECTION, POWDER, FOR SOLUTION INTRAVENOUS at 01:42

## 2021-06-04 RX ADMIN — ATORVASTATIN CALCIUM 40 MG: 40 TABLET, FILM COATED ORAL at 16:37

## 2021-06-04 RX ADMIN — ACETAMINOPHEN 650 MG: 325 TABLET, FILM COATED ORAL at 09:32

## 2021-06-04 RX ADMIN — INSULIN GLARGINE 10 UNITS: 100 INJECTION, SOLUTION SUBCUTANEOUS at 21:00

## 2021-06-04 RX ADMIN — Medication 3.38 G: at 12:14

## 2021-06-04 RX ADMIN — METFORMIN HYDROCHLORIDE 500 MG: 500 TABLET ORAL at 08:25

## 2021-06-04 RX ADMIN — INSULIN LISPRO 5 UNITS: 100 INJECTION, SOLUTION INTRAVENOUS; SUBCUTANEOUS at 16:38

## 2021-06-04 RX ADMIN — FLUTICASONE PROPIONATE 2 PUFF: 220 AEROSOL, METERED RESPIRATORY (INHALATION) at 21:10

## 2021-06-04 RX ADMIN — FLECAINIDE ACETATE 100 MG: 100 TABLET ORAL at 21:01

## 2021-06-04 RX ADMIN — LEVALBUTEROL HYDROCHLORIDE 1.25 MG: 1.25 SOLUTION, CONCENTRATE RESPIRATORY (INHALATION) at 13:27

## 2021-06-04 RX ADMIN — PANTOPRAZOLE SODIUM 40 MG: 40 TABLET, DELAYED RELEASE ORAL at 16:37

## 2021-06-04 RX ADMIN — INSULIN LISPRO 3 UNITS: 100 INJECTION, SOLUTION INTRAVENOUS; SUBCUTANEOUS at 12:14

## 2021-06-04 RX ADMIN — INSULIN LISPRO 5 UNITS: 100 INJECTION, SOLUTION INTRAVENOUS; SUBCUTANEOUS at 08:26

## 2021-06-04 RX ADMIN — DIPHENHYDRAMINE HYDROCHLORIDE 50 MG: 25 TABLET ORAL at 09:32

## 2021-06-04 RX ADMIN — INSULIN LISPRO 2 UNITS: 100 INJECTION, SOLUTION INTRAVENOUS; SUBCUTANEOUS at 08:26

## 2021-06-04 RX ADMIN — Medication 3.38 G: at 00:30

## 2021-06-04 RX ADMIN — ALBUTEROL SULFATE 2 PUFF: 90 AEROSOL, METERED RESPIRATORY (INHALATION) at 21:05

## 2021-06-04 RX ADMIN — LORAZEPAM 1 MG: 1 TABLET ORAL at 21:10

## 2021-06-04 RX ADMIN — TORSEMIDE 20 MG: 20 TABLET ORAL at 16:36

## 2021-06-04 RX ADMIN — METFORMIN HYDROCHLORIDE 500 MG: 500 TABLET ORAL at 16:37

## 2021-06-04 RX ADMIN — FLUTICASONE PROPIONATE 1 SPRAY: 50 SPRAY, METERED NASAL at 21:05

## 2021-06-04 RX ADMIN — INSULIN LISPRO 3 UNITS: 100 INJECTION, SOLUTION INTRAVENOUS; SUBCUTANEOUS at 16:38

## 2021-06-04 RX ADMIN — INSULIN LISPRO 5 UNITS: 100 INJECTION, SOLUTION INTRAVENOUS; SUBCUTANEOUS at 12:14

## 2021-06-04 RX ADMIN — CITALOPRAM HYDROBROMIDE 20 MG: 20 TABLET ORAL at 08:26

## 2021-06-04 RX ADMIN — IRON SUCROSE 300 MG: 20 INJECTION, SOLUTION INTRAVENOUS at 08:25

## 2021-06-04 RX ADMIN — LEVALBUTEROL HYDROCHLORIDE 1.25 MG: 1.25 SOLUTION, CONCENTRATE RESPIRATORY (INHALATION) at 07:28

## 2021-06-04 RX ADMIN — FLUTICASONE PROPIONATE 1 SPRAY: 50 SPRAY, METERED NASAL at 08:26

## 2021-06-04 RX ADMIN — TRAZODONE HYDROCHLORIDE 150 MG: 150 TABLET ORAL at 21:01

## 2021-06-04 RX ADMIN — INSULIN LISPRO 5 UNITS: 100 INJECTION, SOLUTION INTRAVENOUS; SUBCUTANEOUS at 21:05

## 2021-06-04 RX ADMIN — Medication 3.38 G: at 17:35

## 2021-06-04 RX ADMIN — LORATADINE 10 MG: 10 TABLET ORAL at 08:26

## 2021-06-04 RX ADMIN — FLUTICASONE PROPIONATE 2 PUFF: 220 AEROSOL, METERED RESPIRATORY (INHALATION) at 08:26

## 2021-06-04 RX ADMIN — FLECAINIDE ACETATE 100 MG: 100 TABLET ORAL at 08:25

## 2021-06-04 RX ADMIN — Medication 3.38 G: at 05:47

## 2021-06-04 RX ADMIN — TORSEMIDE 20 MG: 20 TABLET ORAL at 21:02

## 2021-06-04 NOTE — ASSESSMENT & PLAN NOTE
· Recently treated for diverticulitis on oral Augmentin to complete later this week  · Began with several episodes of bright red blood per rectum and associated abdominal pain, lactic acidosis  · CT abdomen/pelvis noted with improving inflammation from diverticulitis - ? Given abdominal pain and elevated lactic acidosis consideration for ischemic colitis  · ED attending discussed with General surgery, no surgical interventions at this time  ED attending also discussed with gastroenterology  · Anticoagulated on Eliquis, hold  Patient reports her last dose was 5/31, did not take any of her home medications day of admission 6/1  · Trend H/H q 12    · IV Protonix b i d   · Discussed with GI, OK for clear liquids  · Patient has required3 units PRBCs total since admission- will continue to trend  · venofer  day 2   Given -patient requires Benadryl before infusion

## 2021-06-04 NOTE — ASSESSMENT & PLAN NOTE
· Blood pressure stable at time of admission  · Maintained on metoprolol, torsemide as an outpatient     metoprolol had been held initially for  relativelylow blood pressure on admit- then had recurrent AFib -back on usual doses

## 2021-06-04 NOTE — ASSESSMENT & PLAN NOTE
Wt Readings from Last 3 Encounters:   06/04/21 132 kg (290 lb 9 1 oz)   05/13/21 127 kg (281 lb)   05/02/21 129 kg (285 lb 6 4 oz)     · Appears euvolemic on exam  · Maintained on torsemide as an outpatient -originally held in setting of gastrointestinal bleed  · Patient has received 3+ L of IVF in addition to 2 units PRBCs - feels more short of breath this morning with abdominal distension -  And given IV Lasix-resume her usual torsemide dose  · Daily weights, I/O, low-sodium diet  · Echocardiogram November 2020 EF 69% grade 2 diastolic dysfunction

## 2021-06-04 NOTE — ASSESSMENT & PLAN NOTE
Lab Results   Component Value Date    HGBA1C 9 6 (H) 06/02/2021       Recent Labs     06/03/21  1611 06/03/21  2131 06/04/21  0743 06/04/21  1212   POCGLU 252* 228* 222* 240*       Blood Sugar Average: Last 72 hrs:  · Repeat hemoglobin A1c  ·  restart oral agents  Metformin and Amaryl -now is greater than 48 hours since admit-is on Lantus 10 units plus coverage  · SSI plus Accu-Chek   · Diabetic diet

## 2021-06-04 NOTE — PLAN OF CARE
Problem: Potential for Falls  Goal: Patient will remain free of falls  Description: INTERVENTIONS:  - Assess patient frequently for physical needs  -  Identify cognitive and physical deficits and behaviors that affect risk of falls    -  [place] fall precautions as indicated by assessment   - Educate patient/family on patient safety including physical limitations  - Instruct patient to call for assistance with activity based on assessment  - Modify environment to reduce risk of injury  - Consider OT/PT consult to assist with strengthening/mobility  Outcome: Progressing     Problem: PAIN - ADULT  Goal: Verbalizes/displays adequate comfort level or baseline comfort level  Description: Interventions:  - Encourage patient to monitor pain and request assistance  - Assess pain using appropriate pain scale  - Administer analgesics based on type and severity of pain and evaluate response  - Implement non-pharmacological measures as appropriate and evaluate response  - Consider cultural and social influences on pain and pain management  - Notify physician/advanced practitioner if interventions unsuccessful or patient reports new pain  Outcome: Progressing     Problem: INFECTION - ADULT  Goal: Absence or prevention of progression during hospitalization  Description: INTERVENTIONS:  - Assess and monitor for signs and symptoms of infection  - Monitor lab/diagnostic results  - Monitor all insertion sites, i e  indwelling lines, tubes, and drains  - Monitor endotracheal if appropriate and nasal secretions for changes in amount and color  - [place] appropriate cooling/warming therapies per order  - Administer medications as ordered  - Instruct and encourage patient and family to use good hand hygiene technique  - Identify and instruct in appropriate isolation precautions for identified infection/condition  Outcome: Progressing  Goal: Absence of fever/infection during neutropenic period  Description: INTERVENTIONS:  - Monitor WBC    Outcome: Progressing     Problem: SAFETY ADULT  Goal: Patient will remain free of falls  Description: INTERVENTIONS:  - Assess patient frequently for physical needs  -  Identify cognitive and physical deficits and behaviors that affect risk of falls    -  [place] fall precautions as indicated by assessment   - Educate patient/family on patient safety including physical limitations  - Instruct patient to call for assistance with activity based on assessment  - Modify environment to reduce risk of injury  - Consider OT/PT consult to assist with strengthening/mobility  Outcome: Progressing  Goal: Maintain or return to baseline ADL function  Description: INTERVENTIONS:  -  Assess patient's ability to carry out ADLs; assess patient's baseline for ADL function and identify physical deficits which impact ability to perform ADLs (bathing, care of mouth/teeth, toileting, grooming, dressing, etc )  - Assess/evaluate cause of self-care deficits   - Assess range of motion  - Assess patient's mobility; develop plan if impaired  - Assess patient's need for assistive devices and provide as appropriate  - Encourage maximum independence but intervene and supervise when necessary  - Involve family in performance of ADLs  - Assess for home care needs following discharge   - Consider OT consult to assist with ADL evaluation and planning for discharge  - Provide patient education as appropriate  Outcome: Progressing  Goal: Maintain or return mobility status to optimal level  Description: INTERVENTIONS:  - Assess patient's baseline mobility status (ambulation, transfers, stairs, etc )    - Identify cognitive and physical deficits and behaviors that affect mobility  - Identify mobility aids required to assist with transfers and/or ambulation (gait belt, sit-to-stand, lift, walker, cane, etc )  - [place] fall precautions as indicated by assessment  - Record patient progress and toleration of activity level on Mobility SBAR; progress patient to next Phase/Stage  - Instruct patient to call for assistance with activity based on assessment  - Consider rehabilitation consult to assist with strengthening/weightbearing, etc   Outcome: Progressing     Problem: DISCHARGE PLANNING  Goal: Discharge to home or other facility with appropriate resources  Description: INTERVENTIONS:  - Identify barriers to discharge w/patient and caregiver  - Arrange for needed discharge resources and transportation as appropriate  - Identify discharge learning needs (meds, wound care, etc )  - Arrange for interpretive services to assist at discharge as needed  - Refer to Case Management Department for coordinating discharge planning if the patient needs post-hospital services based on physician/advanced practitioner order or complex needs related to functional status, cognitive ability, or social support system  Outcome: Progressing     Problem: Knowledge Deficit  Goal: Patient/family/caregiver demonstrates understanding of disease process, treatment plan, medications, and discharge instructions  Description: Complete learning assessment and assess knowledge base  Interventions:  - Provide teaching at level of understanding  - Provide teaching via preferred learning methods  Outcome: Progressing     Problem: Nutrition/Hydration-ADULT  Goal: Nutrient/Hydration intake appropriate for improving, restoring or maintaining nutritional needs  Description: Monitor and assess patient's nutrition/hydration status for malnutrition  Collaborate with interdisciplinary team and initiate plan and interventions as ordered  Monitor patient's weight and dietary intake as ordered or per policy  Utilize nutrition screening tool and intervene as necessary  Determine patient's food preferences and provide high-protein, high-caloric foods as appropriate       INTERVENTIONS:  - Monitor oral intake, urinary output, labs, and treatment plans  - Assess nutrition and hydration status and recommend course of action  - Evaluate amount of meals eaten  - Assist patient with eating if necessary   - Allow adequate time for meals  - Recommend/ encourage appropriate diets, oral nutritional supplements, and vitamin/mineral supplements  - Order, calculate, and assess calorie counts as needed  - Recommend, monitor, and adjust tube feedings and TPN/PPN based on assessed needs  - Assess need for intravenous fluids  - Provide specific nutrition/hydration education as appropriate  - Include patient/family/caregiver in decisions related to nutrition  Outcome: Progressing     Problem: Prexisting or High Potential for Compromised Skin Integrity  Goal: Skin integrity is maintained or improved  Description: INTERVENTIONS:  - Identify patients at risk for skin breakdown  - Assess and monitor skin integrity  - Assess and monitor nutrition and hydration status  - Monitor labs   - Assess for incontinence   - Turn and reposition patient  - Assist with mobility/ambulation  - Relieve pressure over bony prominences  - Avoid friction and shearing  - Provide appropriate hygiene as needed including keeping skin clean and dry  - Evaluate need for skin moisturizer/barrier cream  - Collaborate with interdisciplinary team   - Patient/family teaching  - Consider wound care consult   Outcome: Progressing

## 2021-06-04 NOTE — ASSESSMENT & PLAN NOTE
· Rate controlled on metoprolol, flecainide- had recurrent episode of rapid rates on Estela 3, 2021-now improved  · Anticoagulated on Eliquis - hold in setting of lower gastrointestinal bleed- still having some bloody stools so will continue to hold

## 2021-06-04 NOTE — ASSESSMENT & PLAN NOTE
· Patient is chronically on 4 L as an outpatient  ·  no complaints of shortness of breath sitting in chair today  · Continue O2 supplementation to maintain SpO2 > 88%

## 2021-06-04 NOTE — ASSESSMENT & PLAN NOTE
· Does have chronic anemia, hemoglobin baseline appears to be around 9-10  · Hemoglobin 8 5 on admission likely in setting of lower gastrointestinal bleed  · Trend H/H q 6 hours and transfuse as neede   · Hemoglobin 8 2 this morning  ·   Having somewhat mushy stools with minimal amounts of dark spots but not active red  bleeding

## 2021-06-04 NOTE — CASE MANAGEMENT
LOS: 3 days    Met with patient at bedside to discuss discharge planning  -[facility] accepted rfeerral   CM following

## 2021-06-04 NOTE — PROGRESS NOTES
[place]  Progress Note - Miguelangel [facility] 1960, 56 y o  female MRN: [id number]  Unit/Bed#: -Joe Encounter: [id number]  Primary Care Provider: Bill Rivera MD   Date and time admitted to hospital: 6/1/2021  9:40 AM    Lower gastrointestinal bleeding  Assessment & Plan  · Recently treated for diverticulitis on oral Augmentin to complete later this week  · Began with several episodes of bright red blood per rectum and associated abdominal pain, lactic acidosis  · CT abdomen/pelvis noted with improving inflammation from diverticulitis - ? Given abdominal pain and elevated lactic acidosis consideration for ischemic colitis  · ED attending discussed with General surgery, no surgical interventions at this time  ED attending also discussed with gastroenterology  · Anticoagulated on Eliquis, hold  Patient reports her last dose was 5/31, did not take any of her home medications day of admission 6/1  · Trend H/H q 12    · IV Protonix b i d   · Discussed with KATHLEEN OK for clear liquids  · Patient has required3 units PRBCs total since admission- will continue to trend  · venofer  day 2   Given -patient requires Benadryl before infusion    Chronic diastolic congestive heart failure (HCC)  Assessment & Plan  Wt Readings from Last 3 Encounters:   06/04/21 132 kg (290 lb 9 1 oz)   05/13/21 127 kg (281 lb)   05/02/21 129 kg (285 lb 6 4 oz)     · Appears euvolemic on exam  · Maintained on torsemide as an outpatient -originally held in setting of gastrointestinal bleed  · Patient has received 3+ L of IVF in addition to 2 units PRBCs - feels more short of breath this morning with abdominal distension -  And given IV Lasix-resume her usual torsemide dose  · Daily weights, I/O, low-sodium diet  · Echocardiogram November 2020 EF 87% grade 2 diastolic dysfunction    Atrial fibrillation, rapid ([address] )  Assessment & Plan  · Rate controlled on metoprolol, flecainide- had recurrent episode of rapid rates on Estela 3, 2021-now improved  · Anticoagulated on Eliquis - hold in setting of lower gastrointestinal bleed- still having some bloody stools so will continue to hold    Chronic respiratory failure with hypoxia (HCC)  Assessment & Plan  · Patient is chronically on 4 L as an outpatient  ·  no complaints of shortness of breath sitting in chair today  · Continue O2 supplementation to maintain SpO2 > 88%    Type 2 diabetes mellitus, without long-term current use of insulin [facility])  Assessment & Plan  Lab Results   Component Value Date    HGBA1C 9 6 (H) 06/02/2021       Recent Labs     06/03/21  1611 06/03/21  2131 06/04/21  0743 06/04/21  1212   POCGLU 252* 228* 222* 240*       Blood Sugar Average: Last 72 hrs:  · Repeat hemoglobin A1c  ·  restart oral agents  Metformin and Amaryl -now is greater than 48 hours since admit-is on Lantus 10 units plus coverage  · SSI plus Accu-Chek   · Diabetic diet    Benign essential hypertension  Assessment & Plan  · Blood pressure stable at time of admission  · Maintained on metoprolol, torsemide as an outpatient     metoprolol had been held initially for  relativelylow blood pressure on admit- then had recurrent AFib -back on usual doses    Acute blood loss anemia  Assessment & Plan  · Does have chronic anemia, hemoglobin baseline appears to be around 9-10  · Hemoglobin 8 5 on admission likely in setting of lower gastrointestinal bleed  · Trend H/H q 6 hours and transfuse as neede   · Hemoglobin 8 2 this morning  ·   Having somewhat mushy stools with minimal amounts of dark spots but not active red  bleeding      VTE Prophylaxis: in place    Patient Centered Rounds: I rounded with patient's nurse    Current Length of Stay: 3 day(s)    Current Patient Status: Inpatient    Certification Statement: Pt requires additional inpatient hospital stay due to: see assessment and plan        Subjective:    patient reports some "mushy stools" with intermittent dark spots in it but not bright red bleeding  She denies any nausea or vomiting  No lightheadedness    All other ROS are negative    Objective:     Vitals:   Temp (24hrs), Av 3 °F (36 8 °C), Min:97 5 °F (36 4 °C), Max:99 8 °F (37 7 °C)    Temp:  [97 5 °F (36 4 °C)-99 8 °F (37 7 °C)] 97 5 °F (36 4 °C)  HR:  [72-88] 72  Resp:  [18] 18  BP: (131-139)/(68-86) 131/69  SpO2:  [94 %-99 %] 96 %  Body mass index is 51 47 kg/m²  Input and Output Summary (last 24 hours): Intake/Output Summary (Last 24 hours) at 2021 1351  Last data filed at 2021 0830  Gross per 24 hour   Intake 2720 ml   Output 2600 ml   Net 120 ml       Physical Exam:     Physical Exam  Vitals signs and nursing note reviewed  Constitutional:       General: She is not in acute distress  Appearance: She is ill-appearing  Comments: Pale   Eyes:      General: No scleral icterus  Right eye: No discharge  Left eye: No discharge  Neck:      Musculoskeletal: No neck rigidity  Cardiovascular:      Rate and Rhythm: Normal rate  Rhythm irregular  Pulmonary:      Breath sounds: No wheezing  Chest:      Chest wall: No tenderness  Abdominal:      Palpations: Abdomen is soft  Tenderness: There is no abdominal tenderness  There is no guarding  Musculoskeletal:         General: No swelling  Lymphadenopathy:      Cervical: No cervical adenopathy  Skin:     General: Skin is warm and dry  Neurological:      General: No focal deficit present  Mental Status: She is oriented to person, place, and time  Psychiatric:         Mood and Affect: Mood normal          Thought Content: Thought content normal          Judgment: Judgment normal              I personally reviewed labs and imaging reports for today        Last 24 Hours Medication List:   Current Facility-Administered Medications   Medication Dose Route Frequency Provider Last Rate    acetaminophen  650 mg Oral Q6H PRN Diogenes Kolb PA-C      albuterol  2 puff Inhalation Q4H PRN Sandeep Castle PA-C      atorvastatin  40 mg Oral Daily With [email or web address] CARA Mercedes PA-C      citalopram  20 mg Oral Daily Lazarus Barer [place], [place]      diphenhydrAMINE  50 mg Oral See Admin Instructions Vallie November, DO      flecainide  100 mg Oral Q12H [address] Lazarus Barer [place], [place]      fluticasone  1 spray Nasal BID Lazarus Barer [place]      fluticasone  2 puff Inhalation BID Lazarus Barer [place], [place]      insulin glargine  10 Units Subcutaneous HS Lazarus Barer [place], [place]      insulin lispro  1-6 Units Subcutaneous TID [facility] Lazarus Barer [place], [place]      insulin lispro  1-6 Units Subcutaneous HS Lazarus Barer [place], [place]      insulin lispro  5 Units Subcutaneous TID With Meals Sandeep Castle PA-C      iron sucrose  300 mg Intravenous Daily Vallie November, DO      levalbuterol  1 25 mg Nebulization TID Vallie November, DO      loratadine  10 mg Oral Daily Lazarus Barer [place], [place]      LORazepam  1 mg Oral HS Kenney Perez PA-C      metFORMIN  500 mg Oral BID With Meals Vallie November, DO      metoprolol  2 5 mg Intravenous Q6H PRN Vallie November, DO      metoprolol succinate  50 mg Oral Daily Memory AlfVICKY      montelukast  10 mg Oral HS Lazarus Barer [place] soto      pantoprazole  40 mg Oral Early Morning Loida Montelongo MD      piperacillin-tazobactam  3 375 g Intravenous Q6H Lazarus Barebuffy Iglesias PA-C 3 375 g (06/03/21 1238)    torsemide  20 mg Oral BID Loida Montelongo MD      traZODone  150 mg Oral HS Sandeep Castle PA-C            Today, Patient Was Seen By: Duncan November, DO    ** Please Note: Dictation voice to text software may have been used in the creation of this document   **

## 2021-06-04 NOTE — PROGRESS NOTES
Progress Note - Israel Wray 56 y o  female MRN: [id number]    Unit/Bed#: -01 Encounter: [id number]    Assessment and Plan:    68-year-old female with asthma-COPD overlap syndrome, chronic respiratory failure on 4 L home O2, pulmonary HTN, type 2 DM, chronic diastolic CHF, AFib on Eliquis, and recent diverticulitis admitted with severe sepsis and acute blood loss anemia due to lower GI bleed     1) Lower GI bleeding with acute blood loss anemia: Differential diagnosis includes ischemic colitis vs segmental colitis associated with diverticular disease (SCAD)   Less likely diverticular bleeding  Her bleeding appears to be decreasing  She received 1 unit PRBCs yesterday and hemoglobin improved from 7 1-8 2  She is hemodynamically stable  She denies abdominal pain  She is tolerating low-fiber diet     -Would recommend holding Eliquis for at least 2 weeks, as risks of anticoagulation outweigh the benefits in the setting of active GI bleeding  -If she makes full, quick recovery, would plan for outpatient colonoscopy in 6 weeks  -If she has increasing pain or further bleeding, would consider repeat CT scan  -Monitor hemoglobin and stool color  -Agree with IV iron infusion  -Continue low residue diet     2) Diverticulitis: She was completing outpatient antibiotics prior to this admission  CT abdomen pelvis from 6/1 shows interval improvement in inflammatory changes of sigmoid colon with mild residual bowel thickening      -Continue antibiotics  -Appreciate recommendations from Surgery     3) Rapid Afib: Continue to hold Eliquis          Subjective:     Patient seen and examined at bedside  She reports seeing less blood in her stool  She denies abdominal pain  She is tolerating low-fiber diet  Objective:     Vitals: Blood pressure 131/69, pulse 72, temperature 97 5 °F (36 4 °C), resp  rate 18, height 5' 3" (1 6 m), weight 132 kg (290 lb 9 1 oz), SpO2 96 %, not currently breastfeeding  ,Body mass index is 51 47 kg/m²  Intake/Output Summary (Last 24 hours) at 6/4/2021 0957  Last data filed at 6/4/2021 0830  Gross per 24 hour   Intake 2720 ml   Output 3400 ml   Net -680 ml       Physical Exam:     General Appearance: Alert, pleasant female, appears stated age and cooperative  Lungs: Clear to auscultation bilaterally  Heart: Regular rate and rhythm  Abdomen: Obese  Soft  Non-tender to palpation  Extremities: No cyanosis, edema    Invasive Devices     Peripheral Intravenous Line            Peripheral IV 06/04/21 Distal;Right;Ventral (anterior) Wrist less than 1 day                Lab Results:  Results from last 7 days   Lab Units 06/04/21  0519  06/03/21  0501   WBC Thousand/uL 9 60  --  7 25   HEMOGLOBIN g/dL 8 2*   < > 7 2*   HEMATOCRIT % 26 2*  --  23 5*   PLATELETS Thousands/uL 265  --  229   NEUTROS PCT %  --   --  65   LYMPHS PCT %  --   --  20   MONOS PCT %  --   --  8   EOS PCT %  --   --  5    < > = values in this interval not displayed  Results from last 7 days   Lab Units 06/04/21  0519   POTASSIUM mmol/L 3 9   CHLORIDE mmol/L 104   CO2 mmol/L 35*   BUN mg/dL 4*   CREATININE mg/dL 0 51*   CALCIUM mg/dL 8 2*   ALK PHOS U/L 139*   ALT U/L 47   AST U/L 25     Results from last 7 days   Lab Units 06/01/21  1006   INR  1 15     Results from last 7 days   Lab Units 06/01/21  1006   LIPASE u/L 72*       Imaging Studies: I have personally reviewed pertinent imaging studies  Xr Chest Portable    Result Date: 6/2/2021  Impression: No acute cardiopulmonary disease  Workstation performed: [id number]     Ct Abdomen Pelvis With Contrast    Result Date: 6/1/2021  Impression: 1  Interval improvement in inflammatory changes surrounding the previously inflamed loop of sigmoid colon, with mild residual wall thickening  2   Linear soft tissue density connecting this loop of sigmoid colon with the left ovary, may represent sinus tract    Since patient is status post hysterectomy, if present, a sinus tract would present with abscess formation as opposed to vaginal discharge  Currently, no abscess present  If patient develops constitutional symptoms following completion of diverticulitis treatment, consider follow-up CT abdomen and pelvis to evaluate for abscess   3   Hepatic steatosis Workstation performed: [id number]

## 2021-06-05 ENCOUNTER — APPOINTMENT (INPATIENT)
Dept: RADIOLOGY | Facility: HOSPITAL | Age: 61
DRG: 720 | End: 2021-06-05
Payer: COMMERCIAL

## 2021-06-05 LAB
ALBUMIN SERPL BCP-MCNC: 3.1 G/DL (ref 3.5–5)
ALP SERPL-CCNC: 147 U/L (ref 46–116)
ALT SERPL W P-5'-P-CCNC: 47 U/L (ref 12–78)
ANION GAP SERPL CALCULATED.3IONS-SCNC: 3 MMOL/L (ref 4–13)
AST SERPL W P-5'-P-CCNC: 21 U/L (ref 5–45)
BILIRUB SERPL-MCNC: 0.3 MG/DL (ref 0.2–1)
BUN SERPL-MCNC: 6 MG/DL (ref 5–25)
CALCIUM ALBUM COR SERPL-MCNC: 8.8 MG/DL (ref 8.3–10.1)
CALCIUM SERPL-MCNC: 8.1 MG/DL (ref 8.3–10.1)
CHLORIDE SERPL-SCNC: 102 MMOL/L (ref 100–108)
CO2 SERPL-SCNC: 37 MMOL/L (ref 21–32)
CREAT SERPL-MCNC: 0.65 MG/DL (ref 0.6–1.3)
ERYTHROCYTE [DISTWIDTH] IN BLOOD BY AUTOMATED COUNT: 20.9 % (ref 11.6–15.1)
GFR SERPL CREATININE-BSD FRML MDRD: 95 ML/MIN/1.73SQ M
GLUCOSE SERPL-MCNC: 166 MG/DL (ref 65–140)
GLUCOSE SERPL-MCNC: 177 MG/DL (ref 65–140)
GLUCOSE SERPL-MCNC: 197 MG/DL (ref 65–140)
GLUCOSE SERPL-MCNC: 215 MG/DL (ref 65–140)
GLUCOSE SERPL-MCNC: 264 MG/DL (ref 65–140)
HCT VFR BLD AUTO: 28.8 % (ref 34.8–46.1)
HGB BLD-MCNC: 8.7 G/DL (ref 11.5–15.4)
HGB BLD-MCNC: 8.9 G/DL (ref 11.5–15.4)
MCH RBC QN AUTO: 24.7 PG (ref 26.8–34.3)
MCHC RBC AUTO-ENTMCNC: 30.9 G/DL (ref 31.4–37.4)
MCV RBC AUTO: 80 FL (ref 82–98)
PLATELET # BLD AUTO: 308 THOUSANDS/UL (ref 149–390)
PMV BLD AUTO: 10.5 FL (ref 8.9–12.7)
POTASSIUM SERPL-SCNC: 3.6 MMOL/L (ref 3.5–5.3)
PROT SERPL-MCNC: 6.1 G/DL (ref 6.4–8.2)
RBC # BLD AUTO: 3.6 MILLION/UL (ref 3.81–5.12)
SODIUM SERPL-SCNC: 142 MMOL/L (ref 136–145)
WBC # BLD AUTO: 13.46 THOUSAND/UL (ref 4.31–10.16)

## 2021-06-05 PROCEDURE — 94760 N-INVAS EAR/PLS OXIMETRY 1: CPT

## 2021-06-05 PROCEDURE — 94640 AIRWAY INHALATION TREATMENT: CPT

## 2021-06-05 PROCEDURE — 80053 COMPREHEN METABOLIC PANEL: CPT | Performed by: INTERNAL MEDICINE

## 2021-06-05 PROCEDURE — 85018 HEMOGLOBIN: CPT | Performed by: INTERNAL MEDICINE

## 2021-06-05 PROCEDURE — 99232 SBSQ HOSP IP/OBS MODERATE 35: CPT | Performed by: INTERNAL MEDICINE

## 2021-06-05 PROCEDURE — 82948 REAGENT STRIP/BLOOD GLUCOSE: CPT

## 2021-06-05 PROCEDURE — 71046 X-RAY EXAM CHEST 2 VIEWS: CPT

## 2021-06-05 PROCEDURE — 94660 CPAP INITIATION&MGMT: CPT

## 2021-06-05 PROCEDURE — 85027 COMPLETE CBC AUTOMATED: CPT | Performed by: INTERNAL MEDICINE

## 2021-06-05 RX ORDER — NYSTATIN 100000 [USP'U]/G
POWDER TOPICAL 2 TIMES DAILY
Status: DISCONTINUED | OUTPATIENT
Start: 2021-06-05 | End: 2021-06-07 | Stop reason: HOSPADM

## 2021-06-05 RX ORDER — LEVALBUTEROL 1.25 MG/.5ML
1.25 SOLUTION, CONCENTRATE RESPIRATORY (INHALATION) 2 TIMES DAILY
Status: DISCONTINUED | OUTPATIENT
Start: 2021-06-05 | End: 2021-06-07 | Stop reason: HOSPADM

## 2021-06-05 RX ADMIN — Medication 3.38 G: at 05:12

## 2021-06-05 RX ADMIN — MONTELUKAST SODIUM 10 MG: 10 TABLET, FILM COATED ORAL at 21:33

## 2021-06-05 RX ADMIN — FLUTICASONE PROPIONATE 1 SPRAY: 50 SPRAY, METERED NASAL at 20:00

## 2021-06-05 RX ADMIN — INSULIN LISPRO 5 UNITS: 100 INJECTION, SOLUTION INTRAVENOUS; SUBCUTANEOUS at 11:26

## 2021-06-05 RX ADMIN — LORAZEPAM 1 MG: 1 TABLET ORAL at 21:33

## 2021-06-05 RX ADMIN — METOPROLOL SUCCINATE 50 MG: 50 TABLET, EXTENDED RELEASE ORAL at 08:50

## 2021-06-05 RX ADMIN — METFORMIN HYDROCHLORIDE 500 MG: 500 TABLET ORAL at 08:50

## 2021-06-05 RX ADMIN — DIPHENHYDRAMINE HYDROCHLORIDE 50 MG: 25 TABLET ORAL at 08:50

## 2021-06-05 RX ADMIN — INSULIN GLARGINE 10 UNITS: 100 INJECTION, SOLUTION SUBCUTANEOUS at 21:34

## 2021-06-05 RX ADMIN — Medication 3.38 G: at 17:09

## 2021-06-05 RX ADMIN — PANTOPRAZOLE SODIUM 40 MG: 40 TABLET, DELAYED RELEASE ORAL at 05:11

## 2021-06-05 RX ADMIN — INSULIN LISPRO 1 UNITS: 100 INJECTION, SOLUTION INTRAVENOUS; SUBCUTANEOUS at 16:38

## 2021-06-05 RX ADMIN — INSULIN LISPRO 3 UNITS: 100 INJECTION, SOLUTION INTRAVENOUS; SUBCUTANEOUS at 11:26

## 2021-06-05 RX ADMIN — METFORMIN HYDROCHLORIDE 500 MG: 500 TABLET ORAL at 16:37

## 2021-06-05 RX ADMIN — GLIMEPIRIDE 4 MG: 2 TABLET ORAL at 08:50

## 2021-06-05 RX ADMIN — INSULIN LISPRO 5 UNITS: 100 INJECTION, SOLUTION INTRAVENOUS; SUBCUTANEOUS at 08:51

## 2021-06-05 RX ADMIN — FLUTICASONE PROPIONATE 2 PUFF: 220 AEROSOL, METERED RESPIRATORY (INHALATION) at 08:50

## 2021-06-05 RX ADMIN — TORSEMIDE 20 MG: 20 TABLET ORAL at 08:50

## 2021-06-05 RX ADMIN — INSULIN LISPRO 2 UNITS: 100 INJECTION, SOLUTION INTRAVENOUS; SUBCUTANEOUS at 08:51

## 2021-06-05 RX ADMIN — IRON SUCROSE 300 MG: 20 INJECTION, SOLUTION INTRAVENOUS at 08:50

## 2021-06-05 RX ADMIN — Medication 3.38 G: at 11:26

## 2021-06-05 RX ADMIN — FLECAINIDE ACETATE 100 MG: 100 TABLET ORAL at 20:00

## 2021-06-05 RX ADMIN — LEVALBUTEROL HYDROCHLORIDE 1.25 MG: 1.25 SOLUTION, CONCENTRATE RESPIRATORY (INHALATION) at 07:53

## 2021-06-05 RX ADMIN — INSULIN LISPRO 2 UNITS: 100 INJECTION, SOLUTION INTRAVENOUS; SUBCUTANEOUS at 21:35

## 2021-06-05 RX ADMIN — INSULIN LISPRO 5 UNITS: 100 INJECTION, SOLUTION INTRAVENOUS; SUBCUTANEOUS at 16:38

## 2021-06-05 RX ADMIN — NYSTATIN: 100000 POWDER TOPICAL at 22:18

## 2021-06-05 RX ADMIN — LEVALBUTEROL HYDROCHLORIDE 1.25 MG: 1.25 SOLUTION, CONCENTRATE RESPIRATORY (INHALATION) at 21:12

## 2021-06-05 RX ADMIN — ATORVASTATIN CALCIUM 40 MG: 40 TABLET, FILM COATED ORAL at 16:37

## 2021-06-05 RX ADMIN — TRAZODONE HYDROCHLORIDE 150 MG: 150 TABLET ORAL at 21:33

## 2021-06-05 RX ADMIN — TORSEMIDE 20 MG: 20 TABLET ORAL at 20:01

## 2021-06-05 RX ADMIN — LORATADINE 10 MG: 10 TABLET ORAL at 08:50

## 2021-06-05 RX ADMIN — FLECAINIDE ACETATE 100 MG: 100 TABLET ORAL at 08:50

## 2021-06-05 RX ADMIN — ACETAMINOPHEN 650 MG: 325 TABLET, FILM COATED ORAL at 08:50

## 2021-06-05 RX ADMIN — FLUTICASONE PROPIONATE 1 SPRAY: 50 SPRAY, METERED NASAL at 08:51

## 2021-06-05 RX ADMIN — Medication 3.38 G: at 00:11

## 2021-06-05 RX ADMIN — Medication 3.38 G: at 23:51

## 2021-06-05 RX ADMIN — CITALOPRAM HYDROBROMIDE 20 MG: 20 TABLET ORAL at 08:50

## 2021-06-05 NOTE — ASSESSMENT & PLAN NOTE
Lab Results   Component Value Date    HGBA1C 9 6 (H) 06/02/2021       Recent Labs     06/04/21  1212 06/04/21  1609 06/04/21 2048 06/05/21  0752   POCGLU 240* 254* 326* 197*       Blood Sugar Average: Last 72 hrs:  ·   ·  restared oral agents  Metformin and Amaryl -now is greater than 48 hours since admit-is on Lantus 10 units plus coverage  · SSI plus Accu-Chek   · Diabetic diet

## 2021-06-05 NOTE — ASSESSMENT & PLAN NOTE
· Does have chronic anemia, hemoglobin baseline appears to be around 9-10  · Hemoglobin 8 5 on admission likely in setting of lower gastrointestinal bleed then dropped to low sevens with triggering of AFib and chest pain  ·    · Hemoglobin 89 this morning  ·   Stool reported by the patient not have any evidence of blood this morning

## 2021-06-05 NOTE — ASSESSMENT & PLAN NOTE
· Recently treated for diverticulitis on oral Augmentin in the week prior to admission  Began with several episodes of bright red blood per rectum and associated abdominal pain, prompting her to come to the ER CT abdomen/pelvis noted with improving inflammation from diverticulitis - ? Given abdominal pain and elevated lactic acidosis consideration for ischemic colitis  · ED attending discussed with General surgery, no surgical interventions at this time  ED attending also discussed with gastroenterology  · Anticoagulated on Eliquis as an outpatient, hold  Patient reports her last dose was 5/31, did not take any of her home medications day of admission 6/1  ·   · Patient has required 3 units PRBCs total since admission- will continue to trend  · venofer  day 2    For her iron deficiency -patient requires Benadryl before infusion

## 2021-06-05 NOTE — PLAN OF CARE
Problem: Potential for Falls  Goal: Patient will remain free of falls  Description: INTERVENTIONS:  - Assess patient frequently for physical needs  -  Identify cognitive and physical deficits and behaviors that affect risk of falls    -  [place] fall precautions as indicated by assessment   - Educate patient/family on patient safety including physical limitations  - Instruct patient to call for assistance with activity based on assessment  - Modify environment to reduce risk of injury  - Consider OT/PT consult to assist with strengthening/mobility  Outcome: Progressing     Problem: PAIN - ADULT  Goal: Verbalizes/displays adequate comfort level or baseline comfort level  Description: Interventions:  - Encourage patient to monitor pain and request assistance  - Assess pain using appropriate pain scale  - Administer analgesics based on type and severity of pain and evaluate response  - Implement non-pharmacological measures as appropriate and evaluate response  - Consider cultural and social influences on pain and pain management  - Notify physician/advanced practitioner if interventions unsuccessful or patient reports new pain  Outcome: Progressing     Problem: INFECTION - ADULT  Goal: Absence or prevention of progression during hospitalization  Description: INTERVENTIONS:  - Assess and monitor for signs and symptoms of infection  - Monitor lab/diagnostic results  - Monitor all insertion sites, i e  indwelling lines, tubes, and drains  - Monitor endotracheal if appropriate and nasal secretions for changes in amount and color  - Boss appropriate cooling/warming therapies per order  - Administer medications as ordered  - Instruct and encourage patient and family to use good hand hygiene technique  - Identify and instruct in appropriate isolation precautions for identified infection/condition  Outcome: Progressing  Goal: Absence of fever/infection during neutropenic period  Description: INTERVENTIONS:  - Monitor WBC    Outcome: Progressing     Problem: SAFETY ADULT  Goal: Patient will remain free of falls  Description: INTERVENTIONS:  - Assess patient frequently for physical needs  -  Identify cognitive and physical deficits and behaviors that affect risk of falls    -  [place] fall precautions as indicated by assessment   - Educate patient/family on patient safety including physical limitations  - Instruct patient to call for assistance with activity based on assessment  - Modify environment to reduce risk of injury  - Consider OT/PT consult to assist with strengthening/mobility  Outcome: Progressing  Goal: Maintain or return to baseline ADL function  Description: INTERVENTIONS:  -  Assess patient's ability to carry out ADLs; assess patient's baseline for ADL function and identify physical deficits which impact ability to perform ADLs (bathing, care of mouth/teeth, toileting, grooming, dressing, etc )  - Assess/evaluate cause of self-care deficits   - Assess range of motion  - Assess patient's mobility; develop plan if impaired  - Assess patient's need for assistive devices and provide as appropriate  - Encourage maximum independence but intervene and supervise when necessary  - Involve family in performance of ADLs  - Assess for home care needs following discharge   - Consider OT consult to assist with ADL evaluation and planning for discharge  - Provide patient education as appropriate  Outcome: Progressing  Goal: Maintain or return mobility status to optimal level  Description: INTERVENTIONS:  - Assess patient's baseline mobility status (ambulation, transfers, stairs, etc )    - Identify cognitive and physical deficits and behaviors that affect mobility  - Identify mobility aids required to assist with transfers and/or ambulation (gait belt, sit-to-stand, lift, walker, cane, etc )  - [place] fall precautions as indicated by assessment  - Record patient progress and toleration of activity level on Mobility SBAR; progress patient to next Phase/Stage  - Instruct patient to call for assistance with activity based on assessment  - Consider rehabilitation consult to assist with strengthening/weightbearing, etc   Outcome: Progressing     Problem: DISCHARGE PLANNING  Goal: Discharge to home or other facility with appropriate resources  Description: INTERVENTIONS:  - Identify barriers to discharge w/patient and caregiver  - Arrange for needed discharge resources and transportation as appropriate  - Identify discharge learning needs (meds, wound care, etc )  - Arrange for interpretive services to assist at discharge as needed  - Refer to Case Management Department for coordinating discharge planning if the patient needs post-hospital services based on physician/advanced practitioner order or complex needs related to functional status, cognitive ability, or social support system  Outcome: Progressing     Problem: Knowledge Deficit  Goal: Patient/family/caregiver demonstrates understanding of disease process, treatment plan, medications, and discharge instructions  Description: Complete learning assessment and assess knowledge base  Interventions:  - Provide teaching at level of understanding  - Provide teaching via preferred learning methods  Outcome: Progressing     Problem: Nutrition/Hydration-ADULT  Goal: Nutrient/Hydration intake appropriate for improving, restoring or maintaining nutritional needs  Description: Monitor and assess patient's nutrition/hydration status for malnutrition  Collaborate with interdisciplinary team and initiate plan and interventions as ordered  Monitor patient's weight and dietary intake as ordered or per policy  Utilize nutrition screening tool and intervene as necessary  Determine patient's food preferences and provide high-protein, high-caloric foods as appropriate       INTERVENTIONS:  - Monitor oral intake, urinary output, labs, and treatment plans  - Assess nutrition and hydration status and recommend course of action  - Evaluate amount of meals eaten  - Assist patient with eating if necessary   - Allow adequate time for meals  - Recommend/ encourage appropriate diets, oral nutritional supplements, and vitamin/mineral supplements  - Order, calculate, and assess calorie counts as needed  - Recommend, monitor, and adjust tube feedings and TPN/PPN based on assessed needs  - Assess need for intravenous fluids  - Provide specific nutrition/hydration education as appropriate  - Include patient/family/caregiver in decisions related to nutrition  Outcome: Progressing     Problem: Prexisting or High Potential for Compromised Skin Integrity  Goal: Skin integrity is maintained or improved  Description: INTERVENTIONS:  - Identify patients at risk for skin breakdown  - Assess and monitor skin integrity  - Assess and monitor nutrition and hydration status  - Monitor labs   - Assess for incontinence   - Turn and reposition patient  - Assist with mobility/ambulation  - Relieve pressure over bony prominences  - Avoid friction and shearing  - Provide appropriate hygiene as needed including keeping skin clean and dry  - Evaluate need for skin moisturizer/barrier cream  - Collaborate with interdisciplinary team   - Patient/family teaching  - Consider wound care consult   Outcome: Progressing

## 2021-06-05 NOTE — PROGRESS NOTES
Progress note - Gastroenterology   Torsten Isabeler 56 y o  female MRN: [id number]  Unit/Bed#: -01 Encounter: [id number]    ASSESSMENT and PLAN    40-year-old female with asthma-COPD overlap syndrome, chronic respiratory failure on 4 L home O2, pulmonary HTN, type 2 DM, chronic diastolic CHF, AFib on Eliquis, and recent diverticulitis admitted with severe sepsis and acute blood loss anemia due to lower GI bleed     1) Lower GI bleeding with acute blood loss anemia: Differential diagnosis includes ischemic colitis vs segmental colitis associated with diverticular disease (SCAD)   Less likely diverticular bleeding  Her bleeding has stopped   Hemoglobin stable 8-9 range after transfusion       -Would recommend holding Eliquis for at least 2 weeks, as risks of anticoagulation outweigh the benefits in the setting of active GI bleeding  - continue regular diet  - follow H&H  - iron supplementation  - okay to discharge tomorrow  - will arrange outpatient office follow-up and 6-8 week colonoscopy     2) Diverticulitis: She was completing outpatient antibiotics prior to this admission  CT abdomen pelvis from 6/1 shows interval improvement in inflammatory changes of sigmoid colon with mild residual bowel thickening      -complete course of  antibiotics       3) Rapid Afib: Continue to hold Eliquis    Chief Complaint   Patient presents with    Rectal Bleeding     patient reports at least 8 episodes of bright red blood rectal bleeding since last night  reports low abdominal pain near suprapubic area  denies n/v admits to increased weakness       SUBJECTIVE/HPI   No abdominal pain    No GI bleeding    /62   Pulse 70   Temp 97 8 °F (36 6 °C)   Resp 19   Ht 5' 3" (1 6 m)   Wt 132 kg (290 lb 6 4 oz)   SpO2 96%   BMI 51 44 kg/m²     PHYSICALEXAM  General appearance: alert, appears stated age and cooperative  Eyes: PERLLA, EOMI, no icterus   Head: Normocephalic, without obvious abnormality, atraumatic  Lungs: clear to auscultation bilaterally  Heart: regular rate and rhythm, S1, S2 normal, no murmur, click, rub or gallop  Abdomen: soft, non-tender; bowel sounds normal; no masses,  no organomegaly  Extremities: extremities normal, atraumatic, no cyanosis or edema  Neurologic: Grossly normal    Lab Results   Component Value Date    GLUCOSE 131 (H) 09/22/2017    CALCIUM 8 1 (L) 06/05/2021     09/22/2017    K 3 6 06/05/2021    CO2 37 (H) 06/05/2021     06/05/2021    BUN 6 06/05/2021    CREATININE 0 65 06/05/2021     Lab Results   Component Value Date    WBC 13 46 (H) 06/05/2021    HGB 8 7 (L) 06/05/2021    HGB 8 9 (L) 06/05/2021    HCT 28 8 (L) 06/05/2021    MCV 80 (L) 06/05/2021     06/05/2021     Lab Results   Component Value Date    ALT 47 06/05/2021    AST 21 06/05/2021    ALKPHOS 147 (H) 06/05/2021    BILITOT 0 4 09/22/2017     Lab Results   Component Value Date    AMYLASE 31 03/09/2020    AMYLASE 12 (L) 02/14/2017     Lab Results   Component Value Date    LIPASE 72 (L) 06/01/2021     Lab Results   Component Value Date    IRON 17 (L) 06/02/2021    TIBC 347 06/02/2021    FERRITIN 16 06/02/2021     Lab Results   Component Value Date    INR 1 15 06/01/2021

## 2021-06-05 NOTE — PROGRESS NOTES
[place]  Progress Note - Hankandaec Ballard 1960, 56 y o  female MRN: [id number]  Unit/Bed#: -Joe Encounter: [id number]  Primary Care Provider: Josr Titus MD   Date and time admitted to hospital: 6/1/2021  9:40 AM    Acute blood loss anemia  Assessment & Plan  · Does have chronic anemia, hemoglobin baseline appears to be around 9-10  · Hemoglobin 8 5 on admission likely in setting of lower gastrointestinal bleed then dropped to low sevens with triggering of AFib and chest pain  ·    · Hemoglobin 89 this morning  ·   Stool reported by the patient not have any evidence of blood this morning    Lower gastrointestinal bleeding  Assessment & Plan  · Recently treated for diverticulitis on oral Augmentin in the week prior to admission  Began with several episodes of bright red blood per rectum and associated abdominal pain, prompting her to come to the ER CT abdomen/pelvis noted with improving inflammation from diverticulitis - ? Given abdominal pain and elevated lactic acidosis consideration for ischemic colitis  · ED attending discussed with General surgery, no surgical interventions at this time  ED attending also discussed with gastroenterology  · Anticoagulated on Eliquis as an outpatient, hold  Patient reports her last dose was 5/31, did not take any of her home medications day of admission 6/1  ·   · Patient has required 3 units PRBCs total since admission- will continue to trend  · venofer  day 2    For her iron deficiency -patient requires Benadryl before infusion    Chronic diastolic congestive heart failure (HCC)  Assessment & Plan  Wt Readings from Last 3 Encounters:   06/05/21 132 kg (290 lb 6 4 oz)   05/13/21 127 kg (281 lb)   05/02/21 129 kg (285 lb 6 4 oz)     · Appears euvolemic on exam  · Maintained on torsemide as an outpatient -originally held in setting of gastrointestinal bleed  · Patient has received 3+ L of IVF in addition to 2 units PRBCs - feels more short of breath this morning with abdominal distension -  And given IV Lasix  · -resumed her usual torsemide dose on 06/04/ 21  · Daily weights, I/O, low-sodium diet  · Echocardiogram November 2020 EF 10% grade 2 diastolic dysfunction    Atrial fibrillation, rapid (HCC)  Assessment & Plan  · Currently on metoprolol, flecainide- had recurrent episode of rapid rates on Estela 3, 2021-now improved  · Monitor shows patient to be in normal sinus rhythm this morning with rates in the  · Anticoagulated on Eliquis - hold in setting of lower gastrointestinal bleed- still having some bloody stools so will continue to hold-hope to resume next week    Chronic respiratory failure with hypoxia ([facility] )  Assessment & Plan  · Patient is chronically on 4 L as an outpatient  · Some chest tightness and shortness of breath sitting in chair today  · Continue O2 supplementation to maintain SpO2 > 88%  · Will repeat chest x-ray today given worsening shortness of breath    Type 2 diabetes mellitus, without long-term current use of insulin [facility])  Assessment & Plan  Lab Results   Component Value Date    HGBA1C 9 6 (H) 06/02/2021       Recent Labs     06/04/21  1212 06/04/21  1609 06/04/21  2048 06/05/21  0752   POCGLU 240* 254* 326* 197*       Blood Sugar Average: Last 72 hrs:  ·   ·  restared oral agents  Metformin and Amaryl -now is greater than 48 hours since admit-is on Lantus 10 units plus coverage  · SSI plus Accu-Chek   · Diabetic diet    Benign essential hypertension  Assessment & Plan  · Blood pressure stable at time of admission  · Maintained on metoprolol, torsemide as an outpatient     metoprolol had been held initially for  relativelylow blood pressure on admit- then had recurrent AFib -back on usual doses           VTE Prophylaxis: in place    Patient Centered Rounds: I rounded with patient's nurse    Current Length of Stay: 4 day(s)    Current Patient Status: Inpatient    Certification Statement: Pt requires additional inpatient hospital stay due to: see assessment and plan        Subjective:   Patient reports having more formed stools today without any evidence of blood  She does feel some tightness in her chest and has her ongoing cough but no sputum production this morning  Patient denies any abdominal pain or bloating    All other ROS are negative    Objective:     Vitals:   Temp (24hrs), Av 3 °F (36 8 °C), Min:97 8 °F (36 6 °C), Max:99 °F (37 2 °C)    Temp:  [97 8 °F (36 6 °C)-99 °F (37 2 °C)] 97 8 °F (36 6 °C)  HR:  [67-91] 70  Resp:  [15-19] 19  BP: (117-135)/(61-92) 132/62  SpO2:  [95 %-100 %] 96 %  Body mass index is 51 44 kg/m²  Input and Output Summary (last 24 hours): Intake/Output Summary (Last 24 hours) at 2021 0847  Last data filed at 2021 0504  Gross per 24 hour   Intake 480 ml   Output 6700 ml   Net -6220 ml       Physical Exam:     Physical Exam  Vitals signs and nursing note reviewed  Constitutional:       General: She is not in acute distress  Appearance: She is not diaphoretic  Eyes:      General: No scleral icterus  Right eye: No discharge  Left eye: No discharge  Cardiovascular:      Rate and Rhythm: Normal rate and regular rhythm  Pulmonary:      Breath sounds: Rhonchi present  No wheezing  Comments: Generally decreased breath sounds-on her chronic O2 at 3 L currently  Abdominal:      General: Abdomen is flat  Palpations: Abdomen is soft  Comments: Obese   Skin:     General: Skin is warm and dry  Coloration: Skin is pale  Skin is not jaundiced  Neurological:      General: No focal deficit present  Mental Status: She is oriented to person, place, and time  I personally reviewed labs and imaging reports for today        Last 24 Hours Medication List:   Current Facility-Administered Medications   Medication Dose Route Frequency Provider Last Rate    acetaminophen  650 mg Oral Q6H PRN Mk Pham PA-C      albuterol  2 puff Inhalation Q4H PRN Sandeep Castle PA-C      atorvastatin  40 mg Oral Daily With [address], [place]      citalopram  20 mg Oral Daily Lazarus Barer [place], [place]      diphenhydrAMINE  50 mg Oral See Admin Instructions Vallie November, DO      flecainide  100 mg Oral Q12H [address] Lazarus Barer [place], [place]      fluticasone  1 spray Nasal BID [facility]      fluticasone  2 puff Inhalation BID Sandeep Castle PA-C      glimepiride  4 mg Oral Daily With [employer], DO      insulin glargine  10 Units Subcutaneous HS Lazarus Barer [place], [place]      insulin lispro  1-6 Units Subcutaneous TID [facility] Lazarus Barer [place], [place]      insulin lispro  1-6 Units Subcutaneous HS Lazarus Barer [place], [place]      insulin lispro  5 Units Subcutaneous TID With Meals Sandeep Castle PA-C      iron sucrose  300 mg Intravenous Daily Vallie November, DO      levalbuterol  1 25 mg Nebulization BID Vallie November, DO      loratadine  10 mg Oral Daily Lazarus Barer [place], [place]      LORazepam  1 mg Oral HS Kenney Perez PA-C      metFORMIN  500 mg Oral BID With Meals Vallie November, DO      metoprolol  2 5 mg Intravenous Q6H PRN Vallie November, DO      metoprolol succinate  50 mg Oral Daily Memory Alf, CRMACHO      montelukast  10 mg Oral HS Lazarus Barebuffy Iglesias PA-C      pantoprazole  40 mg Oral Early Morning Loida Montelongo MD      piperacillin-tazobactam  3 375 g Intravenous Q6H Lazarus Barer [place], PA-C 3 375 g (06/03/21 1238)    torsemide  20 mg Oral BID Loida Montelongo MD      traZODone  150 mg Oral HS Sandeep Castle PA-C            Today, Patient Was Seen By: Duncan November, DO    ** Please Note: Dictation voice to text software may have been used in the creation of this document   **

## 2021-06-05 NOTE — ASSESSMENT & PLAN NOTE
Wt Readings from Last 3 Encounters:   06/05/21 132 kg (290 lb 6 4 oz)   05/13/21 127 kg (281 lb)   05/02/21 129 kg (285 lb 6 4 oz)     · Appears euvolemic on exam  · Maintained on torsemide as an outpatient -originally held in setting of gastrointestinal bleed  · Patient has received 3+ L of IVF in addition to 2 units PRBCs - feels more short of breath this morning with abdominal distension -  And given IV Lasix  · -resumed her usual torsemide dose on 06/04/ 21  · Daily weights, I/O, low-sodium diet  · Echocardiogram November 2020 EF 32% grade 2 diastolic dysfunction

## 2021-06-05 NOTE — ASSESSMENT & PLAN NOTE
· Patient is chronically on 4 L as an outpatient  · Some chest tightness and shortness of breath sitting in chair today  · Continue O2 supplementation to maintain SpO2 > 88%  · Will repeat chest x-ray today given worsening shortness of breath

## 2021-06-05 NOTE — ASSESSMENT & PLAN NOTE
· Currently on metoprolol, flecainide- had recurrent episode of rapid rates on Estela 3, 2021-now improved  · Monitor shows patient to be in normal sinus rhythm this morning with rates in the  · Anticoagulated on Eliquis - hold in setting of lower gastrointestinal bleed- still having some bloody stools so will continue to hold-hope to resume next week

## 2021-06-06 PROBLEM — K57.92 DIVERTICULITIS: Status: ACTIVE | Noted: 2021-06-06

## 2021-06-06 LAB
BACTERIA BLD CULT: NORMAL
BACTERIA BLD CULT: NORMAL
ERYTHROCYTE [DISTWIDTH] IN BLOOD BY AUTOMATED COUNT: 21.8 % (ref 11.6–15.1)
GLUCOSE SERPL-MCNC: 197 MG/DL (ref 65–140)
GLUCOSE SERPL-MCNC: 197 MG/DL (ref 65–140)
GLUCOSE SERPL-MCNC: 223 MG/DL (ref 65–140)
GLUCOSE SERPL-MCNC: 257 MG/DL (ref 65–140)
HCT VFR BLD AUTO: 29.3 % (ref 34.8–46.1)
HGB BLD-MCNC: 8.7 G/DL (ref 11.5–15.4)
MCH RBC QN AUTO: 24.2 PG (ref 26.8–34.3)
MCHC RBC AUTO-ENTMCNC: 29.7 G/DL (ref 31.4–37.4)
MCV RBC AUTO: 81 FL (ref 82–98)
PLATELET # BLD AUTO: 329 THOUSANDS/UL (ref 149–390)
PMV BLD AUTO: 11.6 FL (ref 8.9–12.7)
RBC # BLD AUTO: 3.6 MILLION/UL (ref 3.81–5.12)
WBC # BLD AUTO: 13.86 THOUSAND/UL (ref 4.31–10.16)

## 2021-06-06 PROCEDURE — 94640 AIRWAY INHALATION TREATMENT: CPT

## 2021-06-06 PROCEDURE — 94760 N-INVAS EAR/PLS OXIMETRY 1: CPT

## 2021-06-06 PROCEDURE — 99232 SBSQ HOSP IP/OBS MODERATE 35: CPT | Performed by: PHYSICIAN ASSISTANT

## 2021-06-06 PROCEDURE — 94660 CPAP INITIATION&MGMT: CPT

## 2021-06-06 PROCEDURE — 82948 REAGENT STRIP/BLOOD GLUCOSE: CPT

## 2021-06-06 PROCEDURE — 85027 COMPLETE CBC AUTOMATED: CPT | Performed by: INTERNAL MEDICINE

## 2021-06-06 PROCEDURE — 99232 SBSQ HOSP IP/OBS MODERATE 35: CPT | Performed by: INTERNAL MEDICINE

## 2021-06-06 RX ORDER — CIPROFLOXACIN 500 MG/1
500 TABLET, FILM COATED ORAL EVERY 12 HOURS SCHEDULED
Status: DISCONTINUED | OUTPATIENT
Start: 2021-06-06 | End: 2021-06-07 | Stop reason: HOSPADM

## 2021-06-06 RX ORDER — METRONIDAZOLE 500 MG/1
500 TABLET ORAL EVERY 12 HOURS SCHEDULED
Status: DISCONTINUED | OUTPATIENT
Start: 2021-06-06 | End: 2021-06-07 | Stop reason: HOSPADM

## 2021-06-06 RX ADMIN — INSULIN GLARGINE 10 UNITS: 100 INJECTION, SOLUTION SUBCUTANEOUS at 21:19

## 2021-06-06 RX ADMIN — MONTELUKAST SODIUM 10 MG: 10 TABLET, FILM COATED ORAL at 21:19

## 2021-06-06 RX ADMIN — PANTOPRAZOLE SODIUM 40 MG: 40 TABLET, DELAYED RELEASE ORAL at 05:00

## 2021-06-06 RX ADMIN — CIPROFLOXACIN HYDROCHLORIDE 500 MG: 500 TABLET, FILM COATED ORAL at 21:19

## 2021-06-06 RX ADMIN — METFORMIN HYDROCHLORIDE 500 MG: 500 TABLET ORAL at 17:03

## 2021-06-06 RX ADMIN — DIPHENHYDRAMINE HYDROCHLORIDE 50 MG: 25 TABLET ORAL at 08:43

## 2021-06-06 RX ADMIN — MICONAZOLE NITRATE: KIT VAGINAL at 22:25

## 2021-06-06 RX ADMIN — INSULIN LISPRO 5 UNITS: 100 INJECTION, SOLUTION INTRAVENOUS; SUBCUTANEOUS at 17:03

## 2021-06-06 RX ADMIN — IRON SUCROSE 300 MG: 20 INJECTION, SOLUTION INTRAVENOUS at 08:15

## 2021-06-06 RX ADMIN — MICONAZOLE NITRATE 1 APPLICATION: KIT VAGINAL at 15:11

## 2021-06-06 RX ADMIN — TORSEMIDE 20 MG: 20 TABLET ORAL at 21:19

## 2021-06-06 RX ADMIN — ATORVASTATIN CALCIUM 40 MG: 40 TABLET, FILM COATED ORAL at 17:03

## 2021-06-06 RX ADMIN — FLUTICASONE PROPIONATE 2 PUFF: 220 AEROSOL, METERED RESPIRATORY (INHALATION) at 07:45

## 2021-06-06 RX ADMIN — NYSTATIN: 100000 POWDER TOPICAL at 17:02

## 2021-06-06 RX ADMIN — FLUTICASONE PROPIONATE 2 PUFF: 220 AEROSOL, METERED RESPIRATORY (INHALATION) at 21:18

## 2021-06-06 RX ADMIN — INSULIN LISPRO 2 UNITS: 100 INJECTION, SOLUTION INTRAVENOUS; SUBCUTANEOUS at 07:45

## 2021-06-06 RX ADMIN — Medication 3.38 G: at 05:00

## 2021-06-06 RX ADMIN — METFORMIN HYDROCHLORIDE 500 MG: 500 TABLET ORAL at 07:44

## 2021-06-06 RX ADMIN — INSULIN LISPRO 5 UNITS: 100 INJECTION, SOLUTION INTRAVENOUS; SUBCUTANEOUS at 07:45

## 2021-06-06 RX ADMIN — INSULIN LISPRO 2 UNITS: 100 INJECTION, SOLUTION INTRAVENOUS; SUBCUTANEOUS at 17:03

## 2021-06-06 RX ADMIN — LORATADINE 10 MG: 10 TABLET ORAL at 08:13

## 2021-06-06 RX ADMIN — INSULIN LISPRO 2 UNITS: 100 INJECTION, SOLUTION INTRAVENOUS; SUBCUTANEOUS at 21:18

## 2021-06-06 RX ADMIN — METRONIDAZOLE 500 MG: 500 TABLET ORAL at 21:19

## 2021-06-06 RX ADMIN — FLUTICASONE PROPIONATE 1 SPRAY: 50 SPRAY, METERED NASAL at 08:15

## 2021-06-06 RX ADMIN — TORSEMIDE 20 MG: 20 TABLET ORAL at 08:13

## 2021-06-06 RX ADMIN — FLUTICASONE PROPIONATE 1 SPRAY: 50 SPRAY, METERED NASAL at 21:20

## 2021-06-06 RX ADMIN — METOPROLOL SUCCINATE 50 MG: 50 TABLET, EXTENDED RELEASE ORAL at 08:13

## 2021-06-06 RX ADMIN — FLECAINIDE ACETATE 100 MG: 100 TABLET ORAL at 21:19

## 2021-06-06 RX ADMIN — FLECAINIDE ACETATE 100 MG: 100 TABLET ORAL at 08:13

## 2021-06-06 RX ADMIN — LORAZEPAM 1 MG: 1 TABLET ORAL at 21:19

## 2021-06-06 RX ADMIN — CITALOPRAM HYDROBROMIDE 20 MG: 20 TABLET ORAL at 08:13

## 2021-06-06 RX ADMIN — CIPROFLOXACIN HYDROCHLORIDE 500 MG: 500 TABLET, FILM COATED ORAL at 10:09

## 2021-06-06 RX ADMIN — ACETAMINOPHEN 650 MG: 325 TABLET, FILM COATED ORAL at 08:43

## 2021-06-06 RX ADMIN — LEVALBUTEROL HYDROCHLORIDE 1.25 MG: 1.25 SOLUTION, CONCENTRATE RESPIRATORY (INHALATION) at 07:25

## 2021-06-06 RX ADMIN — MICONAZOLE NITRATE 200 MG: 200 SUPPOSITORY VAGINAL at 21:19

## 2021-06-06 RX ADMIN — TRAZODONE HYDROCHLORIDE 150 MG: 150 TABLET ORAL at 21:19

## 2021-06-06 RX ADMIN — METRONIDAZOLE 500 MG: 500 TABLET ORAL at 10:09

## 2021-06-06 RX ADMIN — LEVALBUTEROL HYDROCHLORIDE 1.25 MG: 1.25 SOLUTION, CONCENTRATE RESPIRATORY (INHALATION) at 20:18

## 2021-06-06 RX ADMIN — INSULIN LISPRO 5 UNITS: 100 INJECTION, SOLUTION INTRAVENOUS; SUBCUTANEOUS at 11:18

## 2021-06-06 RX ADMIN — NYSTATIN: 100000 POWDER TOPICAL at 08:13

## 2021-06-06 RX ADMIN — INSULIN LISPRO 3 UNITS: 100 INJECTION, SOLUTION INTRAVENOUS; SUBCUTANEOUS at 11:17

## 2021-06-06 RX ADMIN — GLIMEPIRIDE 4 MG: 2 TABLET ORAL at 07:44

## 2021-06-06 NOTE — ASSESSMENT & PLAN NOTE
· Patient recently treated for diverticulitis with p o  Augmentin  · Patient receiving IV Zosyn while inpatient  · White blood cell count continues to trend upward, most recent 13 8  · Will discontinue Venofer  · Will transition patient from IV Zosyn to p o  Flagyl and Cipro  · CBC in a m    · Trend fever and white blood cell curve

## 2021-06-06 NOTE — ASSESSMENT & PLAN NOTE
Wt Readings from Last 3 Encounters:   06/06/21 132 kg (291 lb)   05/13/21 127 kg (281 lb)   05/02/21 129 kg (285 lb 6 4 oz)     · Appears euvolemic on exam  · Maintained on torsemide as an outpatient -originally held in setting of gastrointestinal bleed  · Patient has received 3+ L of IVF in addition to 3 units PRBCs  · CXR obtained on 6/5-still pending final read  · Resumed her usual torsemide dose on 06/04/21  · Daily weights, I/O, low-sodium diet  · Echocardiogram November 2020 EF 64% grade 2 diastolic dysfunction

## 2021-06-06 NOTE — ASSESSMENT & PLAN NOTE
· Blood pressure stable at time of admission, most recent 118/71  · Maintained on metoprolol 50 mg q d , torsemide 20 mg b i d  as an outpatient  · Metoprolol initially held secondary to relatively low BPs, restarted secondary to AFib

## 2021-06-06 NOTE — ASSESSMENT & PLAN NOTE
· Does have chronic anemia, hemoglobin baseline appears to be around 9-10  · Hemoglobin 8 5 on admission likely in setting of lower gastrointestinal bleed then dropped to 7 1 with triggering of AFib and chest pain  · Received 3 units PRBCs since admission  · Hemoglobin 8 7 this morning  · Stool reported by the patient not have any evidence of blood this morning  · Received 3 days of Venofer, will discontinue at this time given active infection with leukocytosis

## 2021-06-06 NOTE — PROGRESS NOTES
[place]  Progress Note - Arley López 1960, 56 y o  female MRN: [id number]  Unit/Bed#: -Joe Encounter: [id number]  Primary Care Provider: Kj Yusuf MD   Date and time admitted to hospital: 6/1/2021  9:40 AM    Diverticulitis  Assessment & Plan  · Patient recently treated for diverticulitis with p o  Augmentin  · Patient receiving IV Zosyn while inpatient  · White blood cell count continues to trend upward, most recent 13 8  · Will discontinue Venofer  · Will transition patient from IV Zosyn to p o  Flagyl and Cipro  · CBC in a m  · Trend fever and white blood cell curve    Acute blood loss anemia  Assessment & Plan  · Does have chronic anemia, hemoglobin baseline appears to be around 9-10  · Hemoglobin 8 5 on admission likely in setting of lower gastrointestinal bleed then dropped to 7 1 with triggering of AFib and chest pain  · Received 3 units PRBCs since admission  · Hemoglobin 8 7 this morning  · Stool reported by the patient not have any evidence of blood this morning  · Received 3 days of Venofer, will discontinue at this time given active infection with leukocytosis    Transaminitis  Assessment & Plan  · Noted as of 05/24/2021, down trending  · CT abdomen/pelvis - s/p cholecystectomy, hepatic steatosis  · Avoid hepatotoxic agents  · Trend CMP  · Acute hepatitis panel negative    Lower gastrointestinal bleeding  Assessment & Plan  · Recently treated for diverticulitis on oral Augmentin in the week prior to admission  Began with several episodes of bright red blood per rectum and associated abdominal pain, prompting her to come to the ER CT abdomen/pelvis noted with improving inflammation from diverticulitis - ? Given abdominal pain and elevated lactic acidosis consideration for ischemic colitis  · ED attending discussed with General surgery, no surgical interventions at this time    ED attending also discussed with gastroenterology  · Anticoagulated on Eliquis as an outpatient, continue to hold      · Last dose was 5/31, did not take any of her home medications day of admission 6/1  · Patient has required 3 units PRBCs total since admission- will continue to trend  · Venofer day 3 for iron deficiency-will discontinue the setting of potential infection, with leukocytosis    Chronic diastolic congestive heart failure (HCC)  Assessment & Plan  Wt Readings from Last 3 Encounters:   06/06/21 132 kg (291 lb)   05/13/21 127 kg (281 lb)   05/02/21 129 kg (285 lb 6 4 oz)     · Appears euvolemic on exam  · Maintained on torsemide as an outpatient -originally held in setting of gastrointestinal bleed  · Patient has received 3+ L of IVF in addition to 3 units PRBCs  · CXR obtained on 6/5-still pending final read  · Resumed her usual torsemide dose on 06/04/21  · Daily weights, I/O, low-sodium diet  · Echocardiogram November 2020 EF 76% grade 2 diastolic dysfunction    Atrial fibrillation, rapid ([facility])  Assessment & Plan  · Currently on metoprolol, flecainide- had recurrent episode of rapid rates on Estela 3, 2021-now improved  · Monitor shows patient to be in normal sinus rhythm this morning with rates in the  · Anticoagulated on Eliquis - hold in setting of lower gastrointestinal bleed  · Will hold Eliquis x2 weeks per GI recommendations and as the patient is still with active GI bleed    Chronic respiratory failure with hypoxia ([address] )  Assessment & Plan  · Patient is chronically on 4 L as an outpatient  · Continue O2 supplementation to maintain SpO2 > 88%  · Repeat chest x-ray obtained on 06/05-still pending final read    Type 2 diabetes mellitus, without long-term current use of insulin [facility])  Assessment & Plan  Lab Results   Component Value Date    HGBA1C 9 6 (H) 06/02/2021       Recent Labs     06/05/21  1100 06/05/21  1554 06/05/21  2110 06/06/21  0744   POCGLU 264* 166* 215* 197*       Blood Sugar Average: Last 72 hrs:  · Restared oral agents  Metformin and Amaryl -now is greater than 48 hours since admit-is on Lantus 10 units plus coverage  · SSI plus Accu-Chek   · Diabetic diet    Esophageal reflux  Assessment & Plan  · Continue PPI    Benign essential hypertension  Assessment & Plan  · Blood pressure stable at time of admission, most recent 118/71  · Maintained on metoprolol 50 mg q d , torsemide 20 mg b i d  as an outpatient  · Metoprolol initially held secondary to relatively low BPs, restarted secondary to AFib    Lactic acidosis-resolved as of 6/2/2021  Assessment & Plan  · Likely in setting of severe sepsis, recent diverticulitis - ? Consideration for ischemic colitis  · Initial lactic acid 4 7, on repeat has improved to 3 7  · Trend lactic acid Q 2 hours - originally worsened to 6 3  · Anion gap normal on BMP  · Now resolved s/p IVF    VTE Pharmacologic Prophylaxis:   Pharmacologic: Pharmacologic VTE Prophylaxis contraindicated due to GI bleed  Mechanical VTE Prophylaxis in Place: Yes    Patient Centered Rounds: I have performed bedside rounds with nursing staff today  Discussions with Specialists or Other Care Team Provider: GI    Education and Discussions with Family / Patient:  Discussed plan of care with patient, answering questions  Patient stated that she would update her family accordingly  Time Spent for Care: 20 minutes  More than 50% of total time spent on counseling and coordination of care as described above  Current Length of Stay: 5 day(s)    Current Patient Status: Inpatient   Certification Statement: The patient will continue to require additional inpatient hospital stay due to Continue leukocytosis in the setting of diverticulitis    Discharge Plan:  Likely 6/7 with resolution leukocytosis to home    Code Status: Level 1 - Full Code      Subjective:   Patient states that she is feeling okay today  She is resting comfortably in bed, denies any chest pain, shortness of breath, nausea, vomiting    States she had a bowel movement this morning without any blood  Denies any dizziness or lightheadedness  Objective:     Vitals:   Temp (24hrs), Av °F (36 7 °C), Min:97 7 °F (36 5 °C), Max:98 5 °F (36 9 °C)    Temp:  [97 7 °F (36 5 °C)-98 5 °F (36 9 °C)] 97 7 °F (36 5 °C)  HR:  [66-75] 67  Resp:  [18-23] 23  BP: (115-132)/(60-71) 118/71  SpO2:  [95 %-99 %] 98 %  Body mass index is 51 55 kg/m²  Input and Output Summary (last 24 hours): Intake/Output Summary (Last 24 hours) at 2021 1341  Last data filed at 2021 1334  Gross per 24 hour   Intake 1542 ml   Output 7350 ml   Net -5808 ml       Physical Exam:     Physical Exam  Vitals signs and nursing note reviewed  Constitutional:       General: She is not in acute distress  Appearance: Normal appearance  She is well-developed  She is obese  She is not ill-appearing, toxic-appearing or diaphoretic  Comments: With CPAP on   HENT:      Head: Normocephalic and atraumatic  Eyes:      General: No scleral icterus  Conjunctiva/sclera: Conjunctivae normal    Neck:      Musculoskeletal: Neck supple  Cardiovascular:      Rate and Rhythm: Normal rate and regular rhythm  Heart sounds: No murmur  No friction rub  No gallop  Pulmonary:      Effort: Pulmonary effort is normal  No respiratory distress  Breath sounds: No stridor  No wheezing, rhonchi or rales  Comments: Mildly decreased lung sounds secondary to body habitus  Chest:      Chest wall: No tenderness  Abdominal:      General: Abdomen is flat  Bowel sounds are normal  There is no distension  Palpations: Abdomen is soft  Tenderness: There is no abdominal tenderness  There is no guarding or rebound  Musculoskeletal:      Right lower leg: No edema  Left lower leg: No edema  Skin:     General: Skin is warm and dry  Capillary Refill: Capillary refill takes less than 2 seconds  Coloration: Skin is not jaundiced or pale  Findings: No erythema     Neurological:      General: No focal deficit present  Mental Status: She is alert and oriented to person, place, and time  Mental status is at baseline  Additional Data:     Labs:    Results from last 7 days   Lab Units 06/06/21  0455  06/03/21  0501   WBC Thousand/uL 13 86*   < > 7 25   HEMOGLOBIN g/dL 8 7*   < > 7 2*   HEMATOCRIT % 29 3*   < > 23 5*   PLATELETS Thousands/uL 329   < > 229   NEUTROS PCT %  --   --  65   LYMPHS PCT %  --   --  20   MONOS PCT %  --   --  8   EOS PCT %  --   --  5    < > = values in this interval not displayed  Results from last 7 days   Lab Units 06/05/21  0508   SODIUM mmol/L 142   POTASSIUM mmol/L 3 6   CHLORIDE mmol/L 102   CO2 mmol/L 37*   BUN mg/dL 6   CREATININE mg/dL 0 65   ANION GAP mmol/L 3*   CALCIUM mg/dL 8 1*   ALBUMIN g/dL 3 1*   TOTAL BILIRUBIN mg/dL 0 30   ALK PHOS U/L 147*   ALT U/L 47   AST U/L 21   GLUCOSE RANDOM mg/dL 177*     Results from last 7 days   Lab Units 06/01/21  1006   INR  1 15     Results from last 7 days   Lab Units 06/06/21  1103 06/06/21  0744 06/05/21  2110 06/05/21  1554 06/05/21  1100 06/05/21  0752 06/04/21  2048 06/04/21  1609 06/04/21  1212 06/04/21  0743 06/03/21  2131 06/03/21  1611   POC GLUCOSE mg/dl 257* 197* 215* 166* 264* 197* 326* 254* 240* 222* 228* 252*     Results from last 7 days   Lab Units 06/02/21  0329   HEMOGLOBIN A1C % 9 6*     Results from last 7 days   Lab Units 06/02/21  0336 06/02/21  0329 06/02/21  0027 06/01/21  2153 06/01/21  1821  06/01/21  1103   LACTIC ACID mmol/L  --  1 7 2 5* 4 7* 6 3*   < >  --    PROCALCITONIN ng/ml <0 05  --   --   --   --   --  <0 05    < > = values in this interval not displayed  * I Have Reviewed All Lab Data Listed Above  * Additional Pertinent Lab Tests Reviewed:  [address] Admission Reviewed    Imaging:    Imaging Reports Reviewed Today Include: cxr  Imaging Personally Reviewed by Myself Includes:  cxr    Recent Cultures (last 7 days):     Results from last 7 days   Lab Units 06/01/21  1103   BLOOD CULTURE  No Growth After 4 Days  No Growth After 4 Days         Last 24 Hours Medication List:   Current Facility-Administered Medications   Medication Dose Route Frequency Provider Last Rate    acetaminophen  650 mg Oral Q6H PRN Santa Childs PA-C      albuterol  2 puff Inhalation Q4H PRN Sherri Burkett PA-C      atorvastatin  40 mg Oral Daily With [employer] CARA Mercedes PA-C      ciprofloxacin  500 mg Oral Q12H [address] [place], [place]      citalopram  20 mg Oral Daily [place], [place]      diphenhydrAMINE  50 mg Oral See Admin Instructions Jeff Wright DO      flecainide  100 mg Oral Q12H [address] Nery Delgadogiancarlo Iglesias PA-C      fluticasone  1 spray Nasal BID Nery Iglesias PA-C      fluticasone  2 puff Inhalation BID Sherri Burkett PA-C      glimepiride  4 mg Oral Daily With [employer]      insulin glargine  10 Units Subcutaneous HS Nery Carpenterphstephan Iglesias PA-C      insulin lispro  1-6 Units Subcutaneous TID [facility] Nery Iglesias PA-C      insulin lispro  1-6 Units Subcutaneous HS Nery Delgadostephan Iglesias PA-C      insulin lispro  5 Units Subcutaneous TID With Meals Sherri Burkett PA-C      levalbuterol  1 25 mg Nebulization BID Jeff Wright DO      loratadine  10 mg Oral Daily [place], [place]      LORazepam  1 mg Oral HS Santa Childs PA-C      metFORMIN  500 mg Oral BID With Meals Jeff Wright DO      metoprolol  2 5 mg Intravenous Q6H PRN Jeff Wirght DO      metoprolol succinate  50 mg Oral Daily VICKY Garg      metroNIDAZOLE  500 mg Oral Q12H [address] [place], [place]      montelukast  10 mg Oral HS Sherri Burkett PA-C      nystatin   Topical BID Edwin Yang PA-C      pantoprazole  40 mg Oral Early Morning Amrit Evans MD      torsemide  20 mg Oral BID Amrit Evans MD      traZODone  150 mg Oral HS hSerri Burkett PA-C          Today, Patient Was Seen By: ROSALIO Motta    ** Please Note: Dictation voice to text software may have been used in the creation of this document   **

## 2021-06-06 NOTE — ASSESSMENT & PLAN NOTE
Lab Results   Component Value Date    HGBA1C 9 6 (H) 06/02/2021       Recent Labs     06/05/21  1100 06/05/21  1554 06/05/21  2110 06/06/21  0744   POCGLU 264* 166* 215* 197*       Blood Sugar Average: Last 72 hrs:  · Restared oral agents  Metformin and Amaryl -now is greater than 48 hours since admit-is on Lantus 10 units plus coverage  · SSI plus Accu-Chek   · Diabetic diet

## 2021-06-06 NOTE — ASSESSMENT & PLAN NOTE
· Patient is chronically on 4 L as an outpatient  · Continue O2 supplementation to maintain SpO2 > 88%  · Repeat chest x-ray obtained on 06/05-still pending final read

## 2021-06-06 NOTE — ASSESSMENT & PLAN NOTE
· Currently on metoprolol, flecainide- had recurrent episode of rapid rates on Estela 3, 2021-now improved  · Monitor shows patient to be in normal sinus rhythm this morning with rates in the  · Anticoagulated on Eliquis - hold in setting of lower gastrointestinal bleed  · Will hold Eliquis x2 weeks per GI recommendations and as the patient is still with active GI bleed

## 2021-06-06 NOTE — PROGRESS NOTES
Progress note - Gastroenterology   Abril Reusing 56 y o  female MRN: [id number]  Unit/Bed#: -Joe Encounter: [id number]    ASSESSMENT and PLAN    39-year-old female with asthma-COPD overlap syndrome, chronic respiratory failure on 4 L home O2, pulmonary HTN, type 2 DM, chronic diastolic CHF, AFib on Eliquis, and recent diverticulitis admitted with severe sepsis and acute blood loss anemia due to lower GI bleed     1) Lower GI bleeding with acute blood loss anemia: Differential diagnosis includes ischemic colitis vs segmental colitis associated with diverticular disease (SCAD)   Less likely diverticular bleeding  Her bleeding has stopped   Hemoglobin stable 8-9 range after transfusion   Discharge postponed secondary to increased white count     -Would recommend holding Eliquis for at least 2 weeks, as risks of anticoagulation outweigh the benefits in the setting of active GI bleeding  - continue regular diet  - iron supplementation  - will arrange outpatient office follow-up and 6-8 week colonoscopy     2) Diverticulitis: She was completing outpatient antibiotics prior to this admission  CT abdomen pelvis from 6/1 shows interval improvement in inflammatory changes of sigmoid colon with mild residual bowel thickening      -antibiotics changed in light the previous  - if has significant diarrhea then would check stool for C diff    Chief Complaint   Patient presents with    Rectal Bleeding     patient reports at least 8 episodes of bright red blood rectal bleeding since last night  reports low abdominal pain near suprapubic area  denies n/v admits to increased weakness       SUBJECTIVE/HPI   Denies any GI complaints  Main complaint is a yeast infection related to the she is on    No further rectal bleeding or diarrhea    /71   Pulse 67   Temp 97 7 °F (36 5 °C)   Resp (!) 23   Ht 5' 3" (1 6 m)   Wt 132 kg (291 lb)   SpO2 98%   BMI 51 55 kg/m²     PHYSICALEXAM  General appearance: alert, appears stated age and cooperative  Eyes: PERLLA, EOMI, no icterus   Head: Normocephalic, without obvious abnormality, atraumatic  Lungs: clear to auscultation bilaterally  Heart: regular rate and rhythm, S1, S2 normal, no murmur, click, rub or gallop  Abdomen: soft, non-tender; bowel sounds normal; no masses,  no organomegaly  Extremities: extremities normal, atraumatic, no cyanosis or edema  Neurologic: Grossly normal    Lab Results   Component Value Date    GLUCOSE 131 (H) 09/22/2017    CALCIUM 8 1 (L) 06/05/2021     09/22/2017    K 3 6 06/05/2021    CO2 37 (H) 06/05/2021     06/05/2021    BUN 6 06/05/2021    CREATININE 0 65 06/05/2021     Lab Results   Component Value Date    WBC 13 86 (H) 06/06/2021    HGB 8 7 (L) 06/06/2021    HCT 29 3 (L) 06/06/2021    MCV 81 (L) 06/06/2021     06/06/2021     Lab Results   Component Value Date    ALT 47 06/05/2021    AST 21 06/05/2021    ALKPHOS 147 (H) 06/05/2021    BILITOT 0 4 09/22/2017     Lab Results   Component Value Date    AMYLASE 31 03/09/2020    AMYLASE 12 (L) 02/14/2017     Lab Results   Component Value Date    LIPASE 72 (L) 06/01/2021     Lab Results   Component Value Date    IRON 17 (L) 06/02/2021    TIBC 347 06/02/2021    FERRITIN 16 06/02/2021     Lab Results   Component Value Date    INR 1 15 06/01/2021

## 2021-06-06 NOTE — ASSESSMENT & PLAN NOTE
· Recently treated for diverticulitis on oral Augmentin in the week prior to admission  Began with several episodes of bright red blood per rectum and associated abdominal pain, prompting her to come to the ER CT abdomen/pelvis noted with improving inflammation from diverticulitis - ? Given abdominal pain and elevated lactic acidosis consideration for ischemic colitis  · ED attending discussed with General surgery, no surgical interventions at this time  ED attending also discussed with gastroenterology  · Anticoagulated on Eliquis as an outpatient, continue to hold      · Last dose was 5/31, did not take any of her home medications day of admission 6/1  · Patient has required 3 units PRBCs total since admission- will continue to trend  · Venofer day 3 for iron deficiency-will discontinue the setting of potential infection, with leukocytosis

## 2021-06-07 ENCOUNTER — TRANSITIONAL CARE MANAGEMENT (OUTPATIENT)
Dept: FAMILY MEDICINE CLINIC | Facility: HOSPITAL | Age: 61
End: 2021-06-07

## 2021-06-07 VITALS
OXYGEN SATURATION: 96 % | BODY MASS INDEX: 51.91 KG/M2 | DIASTOLIC BLOOD PRESSURE: 60 MMHG | HEART RATE: 68 BPM | SYSTOLIC BLOOD PRESSURE: 117 MMHG | HEIGHT: 63 IN | RESPIRATION RATE: 18 BRPM | TEMPERATURE: 98.3 F | WEIGHT: 293 LBS

## 2021-06-07 LAB
ANION GAP SERPL CALCULATED.3IONS-SCNC: 8 MMOL/L (ref 4–13)
BUN SERPL-MCNC: 6 MG/DL (ref 5–25)
CALCIUM SERPL-MCNC: 8.1 MG/DL (ref 8.3–10.1)
CHLORIDE SERPL-SCNC: 104 MMOL/L (ref 100–108)
CO2 SERPL-SCNC: 36 MMOL/L (ref 21–32)
CREAT SERPL-MCNC: 0.65 MG/DL (ref 0.6–1.3)
ERYTHROCYTE [DISTWIDTH] IN BLOOD BY AUTOMATED COUNT: 23.2 % (ref 11.6–15.1)
GFR SERPL CREATININE-BSD FRML MDRD: 95 ML/MIN/1.73SQ M
GLUCOSE SERPL-MCNC: 173 MG/DL (ref 65–140)
GLUCOSE SERPL-MCNC: 206 MG/DL (ref 65–140)
GLUCOSE SERPL-MCNC: 317 MG/DL (ref 65–140)
HCT VFR BLD AUTO: 29.3 % (ref 34.8–46.1)
HGB BLD-MCNC: 8.7 G/DL (ref 11.5–15.4)
MCH RBC QN AUTO: 24.8 PG (ref 26.8–34.3)
MCHC RBC AUTO-ENTMCNC: 29.7 G/DL (ref 31.4–37.4)
MCV RBC AUTO: 84 FL (ref 82–98)
PLATELET # BLD AUTO: 371 THOUSANDS/UL (ref 149–390)
PMV BLD AUTO: 10.6 FL (ref 8.9–12.7)
POTASSIUM SERPL-SCNC: 3.4 MMOL/L (ref 3.5–5.3)
RBC # BLD AUTO: 3.51 MILLION/UL (ref 3.81–5.12)
SODIUM SERPL-SCNC: 148 MMOL/L (ref 136–145)
WBC # BLD AUTO: 12.42 THOUSAND/UL (ref 4.31–10.16)

## 2021-06-07 PROCEDURE — 99239 HOSP IP/OBS DSCHRG MGMT >30: CPT | Performed by: INTERNAL MEDICINE

## 2021-06-07 PROCEDURE — 82948 REAGENT STRIP/BLOOD GLUCOSE: CPT

## 2021-06-07 PROCEDURE — 99231 SBSQ HOSP IP/OBS SF/LOW 25: CPT | Performed by: INTERNAL MEDICINE

## 2021-06-07 PROCEDURE — 85027 COMPLETE CBC AUTOMATED: CPT | Performed by: INTERNAL MEDICINE

## 2021-06-07 PROCEDURE — 94760 N-INVAS EAR/PLS OXIMETRY 1: CPT

## 2021-06-07 PROCEDURE — 80048 BASIC METABOLIC PNL TOTAL CA: CPT | Performed by: INTERNAL MEDICINE

## 2021-06-07 PROCEDURE — 94640 AIRWAY INHALATION TREATMENT: CPT

## 2021-06-07 RX ORDER — METRONIDAZOLE 500 MG/1
500 TABLET ORAL EVERY 12 HOURS SCHEDULED
Qty: 10 TABLET | Refills: 0 | Status: SHIPPED | OUTPATIENT
Start: 2021-06-07 | End: 2021-06-12

## 2021-06-07 RX ORDER — CIPROFLOXACIN 500 MG/1
500 TABLET, FILM COATED ORAL EVERY 12 HOURS SCHEDULED
Qty: 10 TABLET | Refills: 0 | Status: SHIPPED | OUTPATIENT
Start: 2021-06-07 | End: 2021-06-12

## 2021-06-07 RX ORDER — INSULIN GLARGINE 100 [IU]/ML
10 INJECTION, SOLUTION SUBCUTANEOUS
Qty: 15 ML | Refills: 0 | Status: SHIPPED | OUTPATIENT
Start: 2021-06-07 | End: 2021-06-21 | Stop reason: SDUPTHER

## 2021-06-07 RX ORDER — SODIUM CHLORIDE FOR INHALATION 0.9 %
3 VIAL, NEBULIZER (ML) INHALATION
Status: DISCONTINUED | OUTPATIENT
Start: 2021-06-07 | End: 2021-06-07 | Stop reason: HOSPADM

## 2021-06-07 RX ORDER — POTASSIUM CHLORIDE 20 MEQ/1
40 TABLET, EXTENDED RELEASE ORAL ONCE
Status: COMPLETED | OUTPATIENT
Start: 2021-06-07 | End: 2021-06-07

## 2021-06-07 RX ADMIN — CIPROFLOXACIN HYDROCHLORIDE 500 MG: 500 TABLET, FILM COATED ORAL at 08:20

## 2021-06-07 RX ADMIN — METRONIDAZOLE 500 MG: 500 TABLET ORAL at 08:20

## 2021-06-07 RX ADMIN — LEVALBUTEROL HYDROCHLORIDE 1.25 MG: 1.25 SOLUTION, CONCENTRATE RESPIRATORY (INHALATION) at 07:41

## 2021-06-07 RX ADMIN — INSULIN LISPRO 5 UNITS: 100 INJECTION, SOLUTION INTRAVENOUS; SUBCUTANEOUS at 11:31

## 2021-06-07 RX ADMIN — FLUTICASONE PROPIONATE 1 SPRAY: 50 SPRAY, METERED NASAL at 08:21

## 2021-06-07 RX ADMIN — INSULIN LISPRO 2 UNITS: 100 INJECTION, SOLUTION INTRAVENOUS; SUBCUTANEOUS at 08:19

## 2021-06-07 RX ADMIN — FLECAINIDE ACETATE 100 MG: 100 TABLET ORAL at 08:20

## 2021-06-07 RX ADMIN — LORATADINE 10 MG: 10 TABLET ORAL at 08:20

## 2021-06-07 RX ADMIN — PANTOPRAZOLE SODIUM 40 MG: 40 TABLET, DELAYED RELEASE ORAL at 06:24

## 2021-06-07 RX ADMIN — INSULIN LISPRO 5 UNITS: 100 INJECTION, SOLUTION INTRAVENOUS; SUBCUTANEOUS at 11:30

## 2021-06-07 RX ADMIN — FLUTICASONE PROPIONATE 2 PUFF: 220 AEROSOL, METERED RESPIRATORY (INHALATION) at 08:21

## 2021-06-07 RX ADMIN — METFORMIN HYDROCHLORIDE 500 MG: 500 TABLET ORAL at 08:20

## 2021-06-07 RX ADMIN — ISODIUM CHLORIDE 3 ML: 0.03 SOLUTION RESPIRATORY (INHALATION) at 07:41

## 2021-06-07 RX ADMIN — GLIMEPIRIDE 4 MG: 2 TABLET ORAL at 08:20

## 2021-06-07 RX ADMIN — INSULIN LISPRO 5 UNITS: 100 INJECTION, SOLUTION INTRAVENOUS; SUBCUTANEOUS at 08:19

## 2021-06-07 RX ADMIN — TORSEMIDE 20 MG: 20 TABLET ORAL at 08:20

## 2021-06-07 RX ADMIN — NYSTATIN 1 APPLICATION: 100000 POWDER TOPICAL at 08:23

## 2021-06-07 RX ADMIN — METOPROLOL SUCCINATE 50 MG: 50 TABLET, EXTENDED RELEASE ORAL at 08:20

## 2021-06-07 RX ADMIN — CITALOPRAM HYDROBROMIDE 20 MG: 20 TABLET ORAL at 08:20

## 2021-06-07 RX ADMIN — POTASSIUM CHLORIDE 40 MEQ: 1500 TABLET, EXTENDED RELEASE ORAL at 09:30

## 2021-06-07 NOTE — NURSING NOTE
Reviewed d/c instructions, new rx scripts and f/u appts  With Pt  Pt  Was d/c'd to home and p/u'd by her brother  Pt  Taken to lobby via WC by PCA 
66

## 2021-06-07 NOTE — DISCHARGE INSTRUCTIONS
Insulin Glargine (By injection)   Insulin Glargine, Recombinant (INsophie AWILDA-manoloen, ree-KOM-genaro-chris)  Treats diabetes  Brand Name(s): [employer], Lantus, Lantus Novaplus, Lantus SoloStar, Lantus SoloStar Pen, Lantus SoloStar Pen Novaplus, Semglee, Semglee Pen, Toujeo, Toujeo Max   There may be other brand names for this medicine  When This Medicine Should Not Be Used: This medicine is not right for everyone  Do not use it if you had an allergic reaction to insulin glargine, or during episodes of hypoglycemia (low blood sugar)  How to Use This Medicine:   Injectable  · Your doctor will prescribe your exact dose and tell you how often it should be given  This medicine is given as a shot under your skin  It is usually given in the stomach, thigh, buttocks, or upper arm  · You may be taught how to give your medicine at home  Make sure you understand all instructions before giving yourself an injection  Do not use more medicine or use it more often than your doctor tells you to  · If you use insulin once a day, it is best to use it at about the same time every day  · Always double-check both the concentration (strength) of your insulin and your dose  Concentration and dose are not the same  The dose is how many units of insulin you will use  The concentration tells how many units of insulin are in each milliliter (mL), such as 100 units/mL (U-100), but this does not mean you will use 100 units at a time  · Read and follow the patient instructions that come with this medicine  Talk to your doctor or pharmacist if you have any questions  · This medicine should look clear and colorless before you use it  Do not shake the vial  Do not mix this medicine with any other insulin or with water  · You will be shown the body areas where this shot can be given  Use a different body area each time you give yourself a shot  Keep track of where you give each shot to make sure you rotate body areas   Do not use the exact same spot for each injection  · Use a new needle and syringe each time you inject your medicine  If you use a syringe, use only the kind that is made for insulin injections  Some insulins must be given with a specific type of syringe or needle  Ask your pharmacist if you are not sure which one to use  · Always check the label before use, to make sure you have the correct type of insulin  Do not change the brand, type, or concentration unless your doctor tells you to  If you use a pump or other device, make sure the insulin is made for that device  · Missed dose: Take a dose as soon as you remember  If it is almost time for your next dose, wait until then and take a regular dose  Do not take extra medicine to make up for a missed dose  · Unopened medicine: Store the vials, cartridges, Max [employer], and SoloStar® pen in the refrigerator  Protect from light  Do not freeze  · Opened medicine:   ? Vials: Store in the refrigerator or at room temperature in a cool place, away from sunlight and heat  Use within 28 days  ? Cartridge, Max [employer] or [employer]: Store at room temperature, away from direct heat and light  Do not refrigerate  Throw away any opened cartridge, Semglee pen, or Lantus® SoloStar® pen after 28 days  Throw away any opened Toujeo® Max SoloStar® or SoloStar® pen after 56 days  · Throw away used needles in a hard, closed container that the needles cannot poke through  Keep this container away from children and pets  Drugs and Foods to Avoid:   Ask your doctor or pharmacist before using any other medicine, including over-the-counter medicines, vitamins, and herbal products  · Some medicines can change the amount of insulin you need to use and make it harder for you to control your diabetes  Tell your doctor about all other medicines that you are using  · Do not drink alcohol while you are using this medicine    Warnings While Using This Medicine:   · Tell your doctor if you are pregnant or breastfeeding, or if you have kidney disease, liver disease, heart disease (including heart failure), or ketoacidosis (high ketones and acid in the blood)  · This medicine may cause the following problems:  ? Low blood sugar  ? Fluid retention or heart failure (when used with thiazolidinedione [TZD] medicine)  · Never share insulin pens, needles, or cartridges with anyone  Sharing these can pass hepatitis viruses, HIV, or other illnesses from one person to another  · This medicine may make you dizzy or drowsy  Do not drive or do anything else that could be dangerous until you know how this medicine affects you  · Your doctor will do lab tests at regular visits to check on the effects of this medicine  Keep all appointments  · Keep all medicine out of the reach of children  Never share your medicine with anyone  Possible Side Effects While Using This Medicine:   Call your doctor right away if you notice any of these side effects:  · Allergic reaction: Itching or hives, swelling in your face or hands, swelling or tingling in your mouth or throat, chest tightness, trouble breathing  · Dry mouth, increased thirst, muscle cramps, nausea, vomiting, uneven heartbeat  · Fever, chills, cough, stuffy or runny nose, sore throat, and body aches  · Rapid weight gain, swelling in your hands, ankles, or feet, trouble breathing, tiredness  · Shaking, trembling, sweating, fast or pounding heartbeat, lightheadedness, hunger, confusion  If you notice these less serious side effects, talk with your doctor:   · Redness, pain, itching, swelling, or any skin changes where the shot was given  · Skin rash or itching  If you notice other side effects that you think are caused by this medicine, tell your doctor  Call your doctor for medical advice about side effects   You may report side effects to FDA at 7-425-MSR-6177  © Copyright [employer] 2021 Information is for End User's use only and may not be sold, redistributed or otherwise used for commercial purposes  The above information is an  only  It is not intended as medical advice for individual conditions or treatments  Talk to your doctor, nurse or pharmacist before following any medical regimen to see if it is safe and effective for you  Insulin Pens   WHAT YOU NEED TO KNOW:   An insulin pen is a device used to inject insulin  There are many types of insulin pens available  Most are disposable  A disposable pen contains a prefilled amount of insulin  When this type of pen is empty, it is thrown away  A few types are reusable pens  A reusable pen contains an insulin cartridge that can be replaced  When the cartridge is empty, it is thrown away  Then a new, prefilled cartridge is put in  Always use a new needle every time you inject insulin  DISCHARGE INSTRUCTIONS:   Call your doctor if:   · You feel or see hard lumps in your skin where you inject your insulin  · You think you gave yourself too much or not enough insulin  · Your injections are very painful  · You see blood or clear fluid on your injection site more than once after you inject insulin  · You have questions about how to give the injection  · You cannot afford to buy your diabetes supplies  · You have questions or concerns about your condition or care  How to get the insulin ready to use:   · Check the label and color of the insulin  Check that you have the correct type and strength of insulin  Also check the expiration date on the label  Use a new cartridge or pen if the expiration date has passed  Use a new cartridge or pen if the insulin does not look right  Follow the pen 's instructions for inserting a new cartridge into a reusable pen  · Mix cloudy insulin  Sometimes cloudy insulin separates  The insulin will remain cloudy after mixing, but it will all be cloudy  Gently roll the pen back and forth between the palms of your hands  Repeat this 10 times  Do not shake the pen  This can make the insulin clump together  Next, gently tip the pen up and down 10 times  Do not use the insulin if there are clumps in it after you mix it  How to get the pen ready to use:  No matter what type of pen you use, the steps for using it are the same  · Remove a new pen from the refrigerator 30 minutes before you use it  Insulin should be injected at room temperature  · Wash your hands  Use soap and water or an alcohol-based hand rub  This will help decrease your risk for an infection  · Remove the cap from the pen  Wipe the needle attachment area with an alcohol swab  · Attach a new needle to the pen  Remove the tab from the needle  Do not remove the outer cap on the needle  Push the needle straight onto the pen  Turn the needle clockwise until you cannot turn it more  Make sure the needle is straight  · Remove the needle caps  Remove the outer cap and save it  Remove the inner cap and throw it away  · Remove air from the pen  Air may cause pain during injection  Turn the dial to 2 units  For most insulin pens, you will hear a click for each unit of insulin that you dial  Hold the pen and point the needle up  Gently tap the pen to move air bubbles to the top of the pen  Press the injection button  You should see a drop of insulin on the tip of the pen  If you do not see a drop, change the needle and repeat this step  If you do not see a drop after you repeat this step 3 times, use a new pen  · Select the correct dose on the pen  Turn the dial to the number of units you need to inject  The pointer on the side of your pen should line up with your dose  The dial can be turned in either direction to choose the correct dose  You cannot choose a dose larger than the number of units left in the pen  Use another pen if there is not enough insulin  Instead you can inject part of your dose with the insulin that is left   Next, you can use a new pen to inject the rest of your dose  Where to inject insulin:   · You can inject insulin into your abdomen, upper arm, buttocks, hip, and the front or side of the thigh  Insulin works fastest when it is injected into the abdomen  Do not inject insulin within 2 inches of your belly button or into any stretch marks  · Do not inject insulin into areas where you have a wound or bruising  Insulin injected into wounds or bruises may not get into your body correctly  Do not inject insulin through your clothes  Injecting through clothes can contaminate the needle and may cause an infection  · Use a different area within the site each time you inject insulin  For example, inject insulin into different areas in your abdomen  Insulin injected into the same area can cause lumps, swelling, or thickened skin  How to inject insulin with a pen:   · Clean the skin where you will inject the insulin  You can use an alcohol pad or a cotton swab dipped in alcohol  Let the area dry before you inject  This will decrease pain  · Grab a fold of your skin  Gently pinch the skin and fat between your thumb and first finger  · Insert the needle straight into your skin  Do not hold the syringe at an angle  Make sure the needle is all the way into the skin  Let go of the pinched tissue  · Push the injection button to inject the insulin  Continue to press on the injection button  Keep the needle in place for 10 seconds  · Pull the needle out  Replace the needle cap  Press on your injection site for 5 to 10 seconds  Do not rub  This will keep insulin from leaking out  · Remove the needle from the pen  Twist the capped needle counter clockwise  Place the needle in a heavy-duty laundry detergent bottle or a metal coffee can  The container should have a cap or lid that fits securely  · Replace the pen cap  Store the pen as directed      What to do with used needles:  Ask your local waste authority if you need to follow certain rules for getting rid of your needles  Bring your used needles home with you when you travel  Pack them in a plastic or metal container with a secure lid  How to store an insulin pen:  Do not store your pen with a needle attached  Follow the storage directions on the label or package insert that came with the insulin  Unopened pens can be stored in the refrigerator until you are ready to use them  Most insulin pens can be opened and kept at room temperature  Store your pen in a cool, dry place  Do not keep your pen in direct sunlight or in your car  Throw away pens that have been frozen or exposed to temperatures above 85°F (30°C)  If you travel, keep the pen in a cool pack  Follow up with your healthcare provider as directed:  Write down your questions so you remember to ask them during your visits  © Copyright [address] Information is for End User's use only and may not be sold, redistributed or otherwise used for commercial purposes  All illustrations and images included in CareNotes® are the copyrighted property of [employer]  or [facility] Phillip Sloan   The above information is an  only  It is not intended as medical advice for individual conditions or treatments  Talk to your doctor, nurse or pharmacist before following any medical regimen to see if it is safe and effective for you

## 2021-06-07 NOTE — PLAN OF CARE
Problem: Potential for Falls  Goal: Patient will remain free of falls  Description: INTERVENTIONS:  - Assess patient frequently for physical needs  -  Identify cognitive and physical deficits and behaviors that affect risk of falls    -  [place] fall precautions as indicated by assessment   - Educate patient/family on patient safety including physical limitations  - Instruct patient to call for assistance with activity based on assessment  - Modify environment to reduce risk of injury  - Consider OT/PT consult to assist with strengthening/mobility  Outcome: Progressing     Problem: PAIN - ADULT  Goal: Verbalizes/displays adequate comfort level or baseline comfort level  Description: Interventions:  - Encourage patient to monitor pain and request assistance  - Assess pain using appropriate pain scale  - Administer analgesics based on type and severity of pain and evaluate response  - Implement non-pharmacological measures as appropriate and evaluate response  - Consider cultural and social influences on pain and pain management  - Notify physician/advanced practitioner if interventions unsuccessful or patient reports new pain  Outcome: Progressing     Problem: INFECTION - ADULT  Goal: Absence or prevention of progression during hospitalization  Description: INTERVENTIONS:  - Assess and monitor for signs and symptoms of infection  - Monitor lab/diagnostic results  - Monitor all insertion sites, i e  indwelling lines, tubes, and drains  - Monitor endotracheal if appropriate and nasal secretions for changes in amount and color  - [place] appropriate cooling/warming therapies per order  - Administer medications as ordered  - Instruct and encourage patient and family to use good hand hygiene technique  - Identify and instruct in appropriate isolation precautions for identified infection/condition  Outcome: Progressing  Goal: Absence of fever/infection during neutropenic period  Description: INTERVENTIONS:  - Monitor WBC    Outcome: Progressing     Problem: SAFETY ADULT  Goal: Patient will remain free of falls  Description: INTERVENTIONS:  - Assess patient frequently for physical needs  -  Identify cognitive and physical deficits and behaviors that affect risk of falls    -  [place] fall precautions as indicated by assessment   - Educate patient/family on patient safety including physical limitations  - Instruct patient to call for assistance with activity based on assessment  - Modify environment to reduce risk of injury  - Consider OT/PT consult to assist with strengthening/mobility  Outcome: Progressing  Goal: Maintain or return to baseline ADL function  Description: INTERVENTIONS:  -  Assess patient's ability to carry out ADLs; assess patient's baseline for ADL function and identify physical deficits which impact ability to perform ADLs (bathing, care of mouth/teeth, toileting, grooming, dressing, etc )  - Assess/evaluate cause of self-care deficits   - Assess range of motion  - Assess patient's mobility; develop plan if impaired  - Assess patient's need for assistive devices and provide as appropriate  - Encourage maximum independence but intervene and supervise when necessary  - Involve family in performance of ADLs  - Assess for home care needs following discharge   - Consider OT consult to assist with ADL evaluation and planning for discharge  - Provide patient education as appropriate  Outcome: Progressing  Goal: Maintain or return mobility status to optimal level  Description: INTERVENTIONS:  - Assess patient's baseline mobility status (ambulation, transfers, stairs, etc )    - Identify cognitive and physical deficits and behaviors that affect mobility  - Identify mobility aids required to assist with transfers and/or ambulation (gait belt, sit-to-stand, lift, walker, cane, etc )  - [place] fall precautions as indicated by assessment  - Record patient progress and toleration of activity level on Mobility SBAR; progress patient to next Phase/Stage  - Instruct patient to call for assistance with activity based on assessment  - Consider rehabilitation consult to assist with strengthening/weightbearing, etc   Outcome: Progressing     Problem: DISCHARGE PLANNING  Goal: Discharge to home or other facility with appropriate resources  Description: INTERVENTIONS:  - Identify barriers to discharge w/patient and caregiver  - Arrange for needed discharge resources and transportation as appropriate  - Identify discharge learning needs (meds, wound care, etc )  - Arrange for interpretive services to assist at discharge as needed  - Refer to Case Management Department for coordinating discharge planning if the patient needs post-hospital services based on physician/advanced practitioner order or complex needs related to functional status, cognitive ability, or social support system  Outcome: Progressing     Problem: Knowledge Deficit  Goal: Patient/family/caregiver demonstrates understanding of disease process, treatment plan, medications, and discharge instructions  Description: Complete learning assessment and assess knowledge base  Interventions:  - Provide teaching at level of understanding  - Provide teaching via preferred learning methods  Outcome: Progressing     Problem: Nutrition/Hydration-ADULT  Goal: Nutrient/Hydration intake appropriate for improving, restoring or maintaining nutritional needs  Description: Monitor and assess patient's nutrition/hydration status for malnutrition  Collaborate with interdisciplinary team and initiate plan and interventions as ordered  Monitor patient's weight and dietary intake as ordered or per policy  Utilize nutrition screening tool and intervene as necessary  Determine patient's food preferences and provide high-protein, high-caloric foods as appropriate       INTERVENTIONS:  - Monitor oral intake, urinary output, labs, and treatment plans  - Assess nutrition and hydration status and recommend course of action  - Evaluate amount of meals eaten  - Assist patient with eating if necessary   - Allow adequate time for meals  - Recommend/ encourage appropriate diets, oral nutritional supplements, and vitamin/mineral supplements  - Order, calculate, and assess calorie counts as needed  - Recommend, monitor, and adjust tube feedings and TPN/PPN based on assessed needs  - Assess need for intravenous fluids  - Provide specific nutrition/hydration education as appropriate  - Include patient/family/caregiver in decisions related to nutrition  Outcome: Progressing     Problem: Prexisting or High Potential for Compromised Skin Integrity  Goal: Skin integrity is maintained or improved  Description: INTERVENTIONS:  - Identify patients at risk for skin breakdown  - Assess and monitor skin integrity  - Assess and monitor nutrition and hydration status  - Monitor labs   - Assess for incontinence   - Turn and reposition patient  - Assist with mobility/ambulation  - Relieve pressure over bony prominences  - Avoid friction and shearing  - Provide appropriate hygiene as needed including keeping skin clean and dry  - Evaluate need for skin moisturizer/barrier cream  - Collaborate with interdisciplinary team   - Patient/family teaching  - Consider wound care consult   Outcome: Progressing     Problem: RESPIRATORY - ADULT  Goal: Achieves optimal ventilation and oxygenation  Description: INTERVENTIONS:  - Assess for changes in respiratory status  - Assess for changes in mentation and behavior  - Position to facilitate oxygenation and minimize respiratory effort  - Oxygen administered by appropriate delivery if ordered  - Initiate smoking cessation education as indicated  - Encourage broncho-pulmonary hygiene including cough, deep breathe, Incentive Spirometry  - Assess the need for suctioning and aspirate as needed  - Assess and instruct to report SOB or any respiratory difficulty  - Respiratory Therapy support as indicated  Outcome: Progressing     Problem: METABOLIC, FLUID AND ELECTROLYTES - ADULT  Goal: Electrolytes maintained within normal limits  Description: INTERVENTIONS:  - Monitor labs and assess patient for signs and symptoms of electrolyte imbalances  - Administer electrolyte replacement as ordered  - Monitor response to electrolyte replacements, including repeat lab results as appropriate  - Instruct patient on fluid and nutrition as appropriate  Outcome: Progressing  Goal: Fluid balance maintained  Description: INTERVENTIONS:  - Monitor labs   - Monitor I/O and WT  - Instruct patient on fluid and nutrition as appropriate  - Assess for signs & symptoms of volume excess or deficit  Outcome: Progressing  Goal: Glucose maintained within target range  Description: INTERVENTIONS:  - Monitor Blood Glucose as ordered  - Assess for signs and symptoms of hyperglycemia and hypoglycemia  - Administer ordered medications to maintain glucose within target range  - Assess nutritional intake and initiate nutrition service referral as needed  Outcome: Progressing     Problem: SKIN/TISSUE INTEGRITY - ADULT  Goal: Skin integrity remains intact  Description: INTERVENTIONS  - Identify patients at risk for skin breakdown  - Assess and monitor skin integrity  - Assess and monitor nutrition and hydration status  - Monitor labs (i e  albumin)  - Assess for incontinence   - Turn and reposition patient  - Assist with mobility/ambulation  - Relieve pressure over bony prominences  - Avoid friction and shearing  - Provide appropriate hygiene as needed including keeping skin clean and dry  - Evaluate need for skin moisturizer/barrier cream  - Collaborate with interdisciplinary team (i e  Nutrition, Rehabilitation, etc )   - Patient/family teaching  Outcome: Progressing     Problem: HEMATOLOGIC - ADULT  Goal: Maintains hematologic stability  Description: INTERVENTIONS  - Assess for signs and symptoms of bleeding or hemorrhage  - Monitor labs  - Administer supportive blood products/factors as ordered and appropriate  Outcome: Progressing     Problem: MUSCULOSKELETAL - ADULT  Goal: Maintain or return mobility to safest level of function  Description: INTERVENTIONS:  - Assess patient's ability to carry out ADLs; assess patient's baseline for ADL function and identify physical deficits which impact ability to perform ADLs (bathing, care of mouth/teeth, toileting, grooming, dressing, etc )  - Assess/evaluate cause of self-care deficits   - Assess range of motion  - Assess patient's mobility  - Assess patient's need for assistive devices and provide as appropriate  - Encourage maximum independence but intervene and supervise when necessary  - Involve family in performance of ADLs  - Assess for home care needs following discharge   - Consider OT consult to assist with ADL evaluation and planning for discharge  - Provide patient education as appropriate  Outcome: Progressing

## 2021-06-07 NOTE — PROGRESS NOTES
Progress Note - Kofi Roche 56 y o  female MRN: [id number]    Unit/Bed#: -Joe Encounter: [id number]    Assessment and Plan:    58-year-old female with asthma-COPD overlap syndrome, chronic respiratory failure on 4 L home O2, pulmonary HTN, type 2 DM, chronic diastolic CHF, AFib on Eliquis, and recent diverticulitis admitted with severe sepsis and acute blood loss anemia due to lower GI bleed     1) Lower GI bleeding with acute blood loss anemia: Differential includes ischemic colitis vs segmental colitis associated with diverticular disease (SCAD)   Less likely diverticular bleeding  Her bleeding has resolved  Hemoglobin is stable 8 7  Vital signs stable      -Would recommend holding Eliquis for at least 2 weeks, as risks of anticoagulation outweigh the benefits in the setting of active GI bleeding  -Plan for elective colonoscopy in 6-8 weeks   -Monitor hemoglobin and stool color  -Continue low residue diet     2) Diverticulitis: She was completing outpatient antibiotics prior to this admission  CT abdomen pelvis from 6/1 shows interval improvement in inflammatory changes of sigmoid colon with mild residual bowel thickening      -Continue antibiotics     3) Afib: Continue to hold Eliquis            Subjective:     Patient seen and examined at bedside  She denies any further rectal bleeding  She complains of some mild nausea  Objective:     Vitals: Blood pressure 117/60, pulse 68, temperature 98 3 °F (36 8 °C), resp  rate 18, height 5' 3" (1 6 m), weight (!) 137 kg (301 lb 9 4 oz), SpO2 96 %, not currently breastfeeding  ,Body mass index is 53 42 kg/m²        Intake/Output Summary (Last 24 hours) at 6/7/2021 0938  Last data filed at 6/7/2021 0853  Gross per 24 hour   Intake 900 ml   Output 6875 ml   Net -5975 ml       Physical Exam:     General Appearance: Alert, pleasant, appears stated age and cooperative  Lungs: Clear to auscultation bilaterally  Heart: Regular rate and rhythm  Abdomen:  Obese, normal bowel sounds, soft, mild lower abdominal tenderness to palpation, no rebound or guarding  Extremities: No cyanosis, edema    Invasive Devices     Peripheral Intravenous Line            Peripheral IV 06/06/21 Right;Ventral (anterior) Forearm less than 1 day                Lab Results:  Results from last 7 days   Lab Units 06/07/21 0321 06/03/21  0501   WBC Thousand/uL 12 42*   < > 7 25   HEMOGLOBIN g/dL 8 7*   < > 7 2*   HEMATOCRIT % 29 3*   < > 23 5*   PLATELETS Thousands/uL 371   < > 229   NEUTROS PCT %  --   --  65   LYMPHS PCT %  --   --  20   MONOS PCT %  --   --  8   EOS PCT %  --   --  5    < > = values in this interval not displayed  Results from last 7 days   Lab Units 06/07/21  0321 06/05/21  0508   POTASSIUM mmol/L 3 4* 3 6   CHLORIDE mmol/L 104 102   CO2 mmol/L 36* 37*   BUN mg/dL 6 6   CREATININE mg/dL 0 65 0 65   CALCIUM mg/dL 8 1* 8 1*   ALK PHOS U/L  --  147*   ALT U/L  --  47   AST U/L  --  21     Results from last 7 days   Lab Units 06/01/21  1006   INR  1 15     Results from last 7 days   Lab Units 06/01/21  1006   LIPASE u/L 72*       Imaging Studies: I have personally reviewed pertinent imaging studies  Xr Chest Portable    Result Date: 6/2/2021  Impression: No acute cardiopulmonary disease  Workstation performed: [id number]     Ct Abdomen Pelvis With Contrast    Result Date: 6/1/2021  Impression: 1  Interval improvement in inflammatory changes surrounding the previously inflamed loop of sigmoid colon, with mild residual wall thickening  2   Linear soft tissue density connecting this loop of sigmoid colon with the left ovary, may represent sinus tract  Since patient is status post hysterectomy, if present, a sinus tract would present with abscess formation as opposed to vaginal discharge  Currently, no abscess present  If patient develops constitutional symptoms following completion of diverticulitis treatment, consider follow-up CT abdomen and pelvis to evaluate for abscess   3   Hepatic steatosis Workstation performed: [id number]

## 2021-06-07 NOTE — ASSESSMENT & PLAN NOTE
Lab Results   Component Value Date    HGBA1C 9 6 (H) 06/02/2021       Recent Labs     06/06/21  1103 06/06/21  1620 06/06/21 2059 06/07/21  0736   POCGLU 257* 197* 223* 206*       Blood Sugar Average: Last 72 hrs:  · Hold oral agents  Metformin and Amaryl   · Continue Lantus 10 units plus coverage  · SSI plus Accu-Chek   · Diabetic diet

## 2021-06-07 NOTE — DISCHARGE SUMMARY
[place]  Discharge- [address], 56 y o  female MRN: [id number]  Unit/Bed#: -Joe Encounter: [id number]  Primary Care Provider: Sylwia Peterson MD   Date and time admitted to hospital: 6/1/2021  9:40 AM    Diverticulitis  Assessment & Plan  · Patient recently treated for diverticulitis with p o  Augmentin  · Patient was receiving IV Zosyn while inpatient  · White blood cell count now trending down  · Discontinued Venofer  · Transitioned patient from IV Zosyn to p o  Flagyl and Cipro  · Continue to trend CBC daily  · Trend fever and white blood cell curve  · Outpatient Colonoscopy in 6-8 weeks    Acute blood loss anemia  Assessment & Plan  · Does have chronic anemia, hemoglobin baseline appears to be around 9-10  · Hemoglobin 8 5 on admission likely in setting of lower gastrointestinal bleed then dropped to 7 1 with triggering of AFib and chest pain  · Received 3 units PRBCs since admission  · Hemoglobin 8 7 this morning, remaining stable  · Received 4 days of Venofer    Transaminitis  Assessment & Plan  · Noted as of 05/24/2021, down trending  · CT abdomen/pelvis - s/p cholecystectomy, hepatic steatosis  · Avoid hepatotoxic agents  · Trend CMP  · Acute hepatitis panel negative    Lower gastrointestinal bleeding  Assessment & Plan  · Recently treated for diverticulitis on oral Augmentin in the week prior to admission  Began with several episodes of bright red blood per rectum and associated abdominal pain, prompting her to come to the ER CT abdomen/pelvis noted with improving inflammation from diverticulitis - ? Given abdominal pain and elevated lactic acidosis consideration for ischemic colitis  · ED attending discussed with General surgery, no surgical interventions at this time  ED attending also discussed with gastroenterology  · Anticoagulated on Eliquis as an outpatient, continue to hold      · Last dose was 5/31, did not take any of her home medications day of admission 6/1  · Patient has required 3 units PRBCs total since admission- will continue to trend  · Has received 4 doses of Venofer, discontinued 6/6 due to possible infection given elevated WBC    Chronic diastolic congestive heart failure ([facility] [address] )  Assessment & Plan  Wt Readings from Last 3 Encounters:   06/07/21 (!) 137 kg (301 lb 9 4 oz)   05/13/21 127 kg (281 lb)   05/02/21 129 kg (285 lb 6 4 oz)     · Appears euvolemic on exam  · Maintained on torsemide as an outpatient -originally held in setting of gastrointestinal bleed  · Patient has received 3+ L of IVF in addition to 3 units PRBCs  · CXR obtained on 6/5-still pending final read  · Resumed her usual torsemide dose on 06/04/21  · Daily weights, I/O, low-sodium diet  · Echocardiogram November 2020 EF 97% grade 2 diastolic dysfunction    Atrial fibrillation, rapid ([facility] [address] )  Assessment & Plan  · Currently on metoprolol, flecainide- had recurrent episode of rapid rates on Estela 3, 2021-now improved  · Monitor shows patient to be in normal sinus rhythm  · Anticoagulated on Eliquis - hold in setting of lower gastrointestinal bleed for 2 weeks, resume 6/15      Chronic respiratory failure with hypoxia ([facility] [address] )  Assessment & Plan  · Patient is chronically on 4 L as an outpatient  · Continue O2 supplementation to maintain SpO2 > 88%  · Repeat chest x-ray obtained on 06/05-still pending final read    Type 2 diabetes mellitus, without long-term current use of insulin [facility])  Assessment & Plan  Lab Results   Component Value Date    HGBA1C 9 6 (H) 06/02/2021       Recent Labs     06/06/21  1103 06/06/21  1620 06/06/21 2059 06/07/21  0736   POCGLU 257* 197* 223* 206*       Blood Sugar Average: Last 72 hrs:  · Hold oral agents  Metformin and Amaryl   · Continue Lantus 10 units plus coverage  · SSI plus Accu-Chek   · Diabetic diet    Esophageal reflux  Assessment & Plan  · Continue PPI    Benign essential hypertension  Assessment & Plan  · Blood pressure stable at time of admission, most recent 118/71  · Maintained on metoprolol 50 mg q d , torsemide 20 mg b i d  as an outpatient  · Metoprolol initially held secondary to relatively low BPs, restarted secondary to AFib    Resolved Problems  Date Reviewed: 6/7/2021          Resolved    Lactic acidosis 6/2/2021     Resolved by  Alisha Rutherford PA-C    * (Principal) Severe sepsis ([address] ) 6/3/2021     Resolved by  Sena Salazar DO        Discharging Physician / Practitioner: Ty Raya PA-C  PCP: Bill Rivera MD  Admission Date:   Admission Orders (From admission, onward)     Ordered        06/01/21 1338  Inpatient Admission  Once                   Discharge Date: 06/07/21    Consultations During Hospital Stay:  · Cardiology  · General surgery  · Gastroenterology    Procedures Performed:   · Blood transfusion x3    Significant Findings / Test Results:   · CT abdomen pelvis 6/1:  · Interval improvement in inflammatory changes surrounding the previously inflamed loop of sigmoid colon, with mild residual wall thickening  · Linear soft tissue density connecting this loop of sigmoid colon with the left ovary, may represent sinus tract  Since patient is status post hysterectomy, if present, a sinus tract would present with abscess formation as opposed to vaginal discharge  Currently, no abscess present  If patient develops constitutional symptoms following completion of diverticulitis treatment, consider follow-up CT abdomen and pelvis to evaluate for abscess    · Hepatic steatosis  ·  CXR 6/1:  No acute cardiopulmonary disease    Incidental Findings:   · See above    Test Results Pending at Discharge (will require follow up):   · CXR official read 6/5    Outpatient Tests Requested:  · Repeat colonoscopy 6 weeks    Complications:  Atrial fibrillation prompted by holding of metoprolol    Reason for Admission:  GI bleed, sepsis    Hospital Course:   Miguelangel Jaime is a 56 y o  female patient past medical history of atrial fibrillation, CHF, COPD, chronic respiratory failure, type 2 diabetes mellitus, morbid obesity, GERD who originally presented to the hospital on 6/1/2021 due to 8-9 bloody bowel movements noted at home  She has lower abdominal cramping and discomfort on presentation  She denied chest pain, palpitations, shortness of breath, nausea, vomiting  Patient was recently started on oral Augmentin outpatient for treatment of diverticulitis with plan to complete 10 day course  She did meet sepsis criteria on admission given tachycardia, leukocytosis and lactic acidosis  Eliquis was held given GI bleed  Blood consent was obtained and patient was transfused a total of 3 units throughout her hospitalization  She was started on IV Protonix b i d  GI was consulted  She was started on Zosyn while admitted  General surgery was consulted but given improvement on antibiotics, no surgical intervention was indicated  Patient did experience worsening shortness of breath on 06/02 in the setting of 3 L IVF and 2 unit PRBC with the holding of home diuretics  She was started on IV Lasix at that time  GI was considering ischemic colitis versus diverticular associated colitis  Bleeding did gradually improve  However, patient did go into AFib RVR 6/3 prompting cardiology consultation  Metoprolol had been held due to sepsis/concern for hypotension and converted back with the resuming of metoprolol IV  Given acute blood loss anemia, patient did receive 4 units of Venofer while admitted as well  Patient continued to improve and was able to be transitioned to oral antibiotics  She will require outpatient follow-up with GI and Cardiology  Continue to hold Eliquis for total of 2 weeks  She will need repeat colonoscopy in 6 weeks  Hemodynamically stable at time of discharge and appropriate for outpatient follow-up  Patient verbalized understanding for request for outpatient follow-up    Patient to complete 5 additional days of antibiotics, monitor for signs of C diff  Also started on Lantus this admission given elevated hemoglobin A1c and blood sugars  Please see above list of diagnoses and related plan for additional information  Condition at Discharge: stable    Discharge Day Visit / Exam:   Subjective:  Patient feels well, eager for discharge  Agreeable to outpatient follow-up at this time  Denies any further blood in stool, abdominal pain, fevers or chills  Vitals: Blood Pressure: 117/60 (06/07/21 0735)  Pulse: 68 (06/07/21 0735)  Temperature: 98 3 °F (36 8 °C) (06/07/21 0735)  Temp Source: Oral (06/04/21 2218)  Respirations: 18 (06/07/21 0735)  Height: 5' 3" (160 cm) (06/01/21 0943)  Weight - Scale: (!) 137 kg (301 lb 9 4 oz) (06/07/21 0600)  SpO2: 96 % (06/07/21 0735)  Exam:   Physical Exam  Vitals signs and nursing note reviewed  Constitutional:       General: She is not in acute distress  Appearance: Normal appearance  She is well-developed  HENT:      Head: Normocephalic and atraumatic  Eyes:      General: No scleral icterus  Conjunctiva/sclera: Conjunctivae normal    Cardiovascular:      Rate and Rhythm: Normal rate  Rhythm irregularly irregular  Heart sounds: No murmur  Pulmonary:      Effort: Pulmonary effort is normal       Breath sounds: No wheezing, rhonchi or rales  Abdominal:      General: There is no distension  Palpations: Abdomen is soft  Skin:     General: Skin is warm and dry  Neurological:      General: No focal deficit present  Mental Status: She is alert  Psychiatric:         Mood and Affect: Mood normal          Discussion with Family: Patient declined call to   Discharge instructions/Information to patient and family:   See after visit summary for information provided to patient and family  Provisions for Follow-Up Care:  See after visit summary for information related to follow-up care and any pertinent home health orders  Disposition:   Home    Planned Readmission: none     Discharge Statement:  I spent 65 minutes discharging the patient  This time was spent on the day of discharge  I had direct contact with the patient on the day of discharge  Greater than 50% of the total time was spent examining patient, answering all patient questions, arranging and discussing plan of care with patient as well as directly providing post-discharge instructions  Additional time then spent on discharge activities  Discharge Medications:  See after visit summary for reconciled discharge medications provided to patient and/or family        **Please Note: This note may have been constructed using a voice recognition system**

## 2021-06-07 NOTE — UTILIZATION REVIEW
Continued Stay Review    Date: 6/6/2021                          Current Patient Class: inpatient   Current Level of Care: med surg    HPI:60 y o  female initially admitted on 6/1/2021 inpatient due to sepsis/lower GI bleed/acute blood loss anemia  On home oxygen 4 liters  On Eliquis for atrial fib  Diagnosed with diverticulitis 5/24/2021 on augmentin  Treated with IV Zosyn  =, received 3 units of PRBC this admission, 3 days of IV Venofer  Assessment/Plan: 6/6/2021:  Able to have BM without blood  On exam obese     Lungs mildly decreased lung sounds  Wbc increased to 13 86  H&H 8 7/29  3  Eliquis on hold  Continue to trend WBC and fever curve  IV Venofer dc   cipro started  Vital Signs:   06/07/21 07:35:45  98 3 °F (36 8 °C)  68  18  117/60  79  96 %  28  2 L/min  Nasal cannula  --   06/06/21 22:19:48  97 8 °F (36 6 °C)  86  --  108/53  71  99 %  --  --  --  --   06/06/21 2200  --  --  --  --  --  93 %  --  --  --  Face mask   06/06/21 21:17:30  --  75  18  143/72  96  94 %  --  --  --  --   06/06/21 2021  --  71  18  --  --  98 %  36  4 L/min  Nasal cannula  --   06/06/21 14:57:24  97 3 °F (36 3 °C)Abnormal   72  18  101/53  69  99 %  --  --  --  --   06/06/21 07:36:44  97 7 °F (36 5 °C)  67  23Abnormal   118/71  87  95 %  --  --  --  --   06/06/21 0728  --  --  --  --  --  --  36  4 L/min  Nasal cannula         Pertinent Labs/Diagnostic Results: no updated imaging     Results from last 7 days   Lab Units 06/07/21  0321 06/06/21  0455 06/05/21  0508 06/04/21  1754 06/04/21  0519  06/03/21  0501  06/01/21  1006   WBC Thousand/uL 12 42* 13 86* 13 46*  --  9 60  --  7 25  --  15 85*   HEMOGLOBIN g/dL 8 7* 8 7* 8 9*  8 7* 9 3* 8 2*   < > 7 2*   < > 8 5*   HEMATOCRIT % 29 3* 29 3* 28 8*  --  26 2*  --  23 5*   < > 28 7*   PLATELETS Thousands/uL 371 329 308  --  265  --  229  --  288   NEUTROS ABS Thousands/µL  --   --   --   --   --   --  4 78  --  11 42*    < > = values in this interval not displayed  Results from last 7 days   Lab Units 06/07/21  0321 06/05/21  0508 06/04/21  0519 06/03/21  0501 06/01/21  1006   SODIUM mmol/L 148* 142 142 140 136   POTASSIUM mmol/L 3 4* 3 6 3 9 3 7 4 2   CHLORIDE mmol/L 104 102 104 101 93*   CO2 mmol/L 36* 37* 35* 33* 33*   ANION GAP mmol/L 8 3* 3* 6 10   BUN mg/dL 6 6 4* 3* 10   CREATININE mg/dL 0 65 0 65 0 51* 0 53* 0 76   EGFR ml/min/1 73sq m 95 95 103 102 84   CALCIUM mg/dL 8 1* 8 1* 8 2* 7 8* 8 6     Results from last 7 days   Lab Units 06/05/21  0508 06/04/21  0519 06/03/21  0501 06/01/21  1006   AST U/L 21 25 17 51*   ALT U/L 47 47 45 91*   ALK PHOS U/L 147* 139* 137* 176*   TOTAL PROTEIN g/dL 6 1* 5 7* 5 4* 6 3*   ALBUMIN g/dL 3 1* 2 8* 2 6* 3 0*   TOTAL BILIRUBIN mg/dL 0 30 0 20 0 30 0 30   BILIRUBIN DIRECT mg/dL  --   --  0 19  --      Results from last 7 days   Lab Units 06/07/21  0736 06/06/21  2059 06/06/21  1620 06/06/21  1103 06/06/21  0744 06/05/21  2110 06/05/21  1554 06/05/21  1100 06/05/21  0752 06/04/21  2048 06/04/21  1609 06/04/21  1212   POC GLUCOSE mg/dl 206* 223* 197* 257* 197* 215* 166* 264* 197* 326* 254* 240*     Results from last 7 days   Lab Units 06/07/21  0321 06/05/21  0508 06/04/21  0519 06/03/21  0501 06/01/21  1006   GLUCOSE RANDOM mg/dL 173* 177* 189* 201* 316*         Results from last 7 days   Lab Units 06/02/21  0329   HEMOGLOBIN A1C % 9 6*   EAG mg/dl 229     Results from last 7 days   Lab Units 06/03/21  1947 06/03/21  1557 06/03/21  1036   TROPONIN I ng/mL <0 02 <0 02 <0 02     Results from last 7 days   Lab Units 06/01/21  1006   PROTIME seconds 14 7*   INR  1 15   PTT seconds 28     Results from last 7 days   Lab Units 06/02/21  0336 06/01/21  1103   PROCALCITONIN ng/ml <0 05 <0 05     Results from last 7 days   Lab Units 06/02/21  0329 06/02/21  0027 06/01/21  2153 06/01/21  1821 06/01/21  1432   LACTIC ACID mmol/L 1 7 2 5* 4 7* 6 3* 3 2*     Results from last 7 days   Lab Units 06/02/21  0329   FERRITIN ng/mL 16     Results from last 7 days   Lab Units 06/02/21  0329   HEP B S AG  Non-reactive   HEP C AB  Non-reactive   HEP B C IGM  Non-reactive     Results from last 7 days   Lab Units 06/01/21  1006   LIPASE u/L 72*     Results from last 7 days   Lab Units 06/01/21  1522   CLARITY UA  Clear   COLOR UA  Yellow   SPEC GRAV UA  <=1 005   PH UA  6 5   GLUCOSE UA mg/dl 250 (1/4%)*   KETONES UA mg/dl Negative   BLOOD UA  Negative   PROTEIN UA mg/dl Negative   NITRITE UA  Negative   BILIRUBIN UA  Negative   UROBILINOGEN UA E U /dl 0 2   LEUKOCYTES UA  Negative     Results from last 7 days   Lab Units 06/01/21  1103   BLOOD CULTURE  No Growth After 5 Days  No Growth After 5 Days  Medications:   Scheduled Medications:  atorvastatin, 40 mg, Oral, Daily With Dinner  ciprofloxacin, 500 mg, Oral, Q12H [address]  citalopram, 20 mg, Oral, Daily  flecainide, 100 mg, Oral, Q12H [place]  fluticasone, 1 spray, Nasal, BID  fluticasone, 2 puff, Inhalation, BID  insulin glargine, 10 Units, Subcutaneous, HS  insulin lispro, 1-6 Units, Subcutaneous, TID AC  insulin lispro, 1-6 Units, Subcutaneous, HS  insulin lispro, 5 Units, Subcutaneous, TID With Meals  levalbuterol, 1 25 mg, Nebulization, BID  loratadine, 10 mg, Oral, Daily  LORazepam, 1 mg, Oral, HS  metoprolol succinate, 50 mg, Oral, Daily  metroNIDAZOLE, 500 mg, Oral, Q12H [place]ETTE  miconazole, 200 mg, Vaginal, HS  montelukast, 10 mg, Oral, HS  nystatin, , Topical, BID  pantoprazole, 40 mg, Oral, Early Morning  sodium chloride, 3 mL, Nebulization, BID  torsemide, 20 mg, Oral, BID  traZODone, 150 mg, Oral, HS      Continuous IV Infusions: none      PRN Meds:  acetaminophen, 650 mg, Oral, Q6H PRN - used x 1   albuterol, 2 puff, Inhalation, Q4H PRN  metoprolol, 2 5 mg, Intravenous, Q6H PRN  miconazole, , Topical, BID PRN- used x 3         Discharge Plan: To be determined       Network Utilization Review Department  ATTENTION: Please call with any questions or concerns to [phone] and carefully listen to the prompts so that you are directed to the right person  All voicemails are confidential   Richard Castillo all requests for admission clinical reviews, approved or denied determinations and any other requests to dedicated fax number below belonging to the campus where the patient is receiving treatment   List of dedicated fax numbers for the Facilities:  [address] DENIALS (Administrative/Medical Necessity) [phone]   [address] (Maternity/NICU/Pediatrics) [phone]   [address] [place] [address] [address] [address] Shaun Kuo [address] [facility] [place] [address] [phone]

## 2021-06-07 NOTE — ASSESSMENT & PLAN NOTE
· Does have chronic anemia, hemoglobin baseline appears to be around 9-10  · Hemoglobin 8 5 on admission likely in setting of lower gastrointestinal bleed then dropped to 7 1 with triggering of AFib and chest pain  · Received 3 units PRBCs since admission  · Hemoglobin 8 7 this morning, remaining stable  · Received 4 days of Venofer

## 2021-06-07 NOTE — ASSESSMENT & PLAN NOTE
Wt Readings from Last 3 Encounters:   06/07/21 (!) 137 kg (301 lb 9 4 oz)   05/13/21 127 kg (281 lb)   05/02/21 129 kg (285 lb 6 4 oz)     · Appears euvolemic on exam  · Maintained on torsemide as an outpatient -originally held in setting of gastrointestinal bleed  · Patient has received 3+ L of IVF in addition to 3 units PRBCs  · CXR obtained on 6/5-still pending final read  · Resumed her usual torsemide dose on 06/04/21  · Daily weights, I/O, low-sodium diet  · Echocardiogram November 2020 EF 03% grade 2 diastolic dysfunction

## 2021-06-07 NOTE — CASE MANAGEMENT
LOS 6 DAYS  Pt is medically clear for DC home today  CM met with Pt; discussed [facility] will follow-up with her at home; Pt verbalized understanding  Pt with no questions, concerns  Daughter will transport Pt home  CM dept will follow Pt to DC

## 2021-06-07 NOTE — ASSESSMENT & PLAN NOTE
· Currently on metoprolol, flecainide- had recurrent episode of rapid rates on Estela 3, 2021-now improved  · Monitor shows patient to be in normal sinus rhythm  · Anticoagulated on Eliquis - hold in setting of lower gastrointestinal bleed for 2 weeks, resume 6/15

## 2021-06-07 NOTE — ASSESSMENT & PLAN NOTE
· Patient recently treated for diverticulitis with p o  Augmentin  · Patient was receiving IV Zosyn while inpatient  · White blood cell count now trending down  · Discontinued Venofer  · Transitioned patient from IV Zosyn to p o   Flagyl and Cipro  · Continue to trend CBC daily  · Trend fever and white blood cell curve  · Outpatient Colonoscopy in 6-8 weeks

## 2021-06-07 NOTE — DISCHARGE INSTR - AVS FIRST PAGE
Dear Miguelangel Jaime,     It was our pleasure to care for you here at [facility], [address] [place] [address]  It is our hope that we were always able to exceed the expected standards for your care during your stay  You were hospitalized due to GI bleed  You were cared for on the *** floor by Ty Raya PA-C under the service of Stacia Siu MD with the [facility]ist [facility] who covers for your primary care physician (PCP), Bill Rivera MD, while you were hospitalized  If you have any questions or concerns related to this hospitalization, you may contact us at [phone]  For follow up as well as any medication refills, we recommend that you follow up with your primary care physician  A registered nurse will reach out to you by phone within a few days after your discharge to answer any additional questions that you may have after going home  However, at this time we provide for you here, the most important instructions / recommendations at discharge:     · Notable Medication Adjustments -   · Hold Eliquis until 6/15  · Continue antibiotics for 5 additional days  · Testing Required after Discharge -   · Colonoscopy in 6-8 weeks  · Important follow up information -   · Follow-up outpatient with GI for repeat colonoscopy in 6-8 weeks  · Follow-up with Cardiology for routine monitor  · Follow-up with primary care provider within 1 week  · Other Instructions -   · If you experience worsening diarrhea, please come to emergency department for testing of possible C diff  · Return to hospital if you experience fatigue weakness associated with blood in stool  · Please review this entire after visit summary as additional general instructions including medication list, appointments, activity, diet, any pertinent wound care, and other additional recommendations from your care team that may be provided for you        Sincerely,     Ty Raya PA-C

## 2021-06-07 NOTE — ASSESSMENT & PLAN NOTE
· Recently treated for diverticulitis on oral Augmentin in the week prior to admission  Began with several episodes of bright red blood per rectum and associated abdominal pain, prompting her to come to the ER CT abdomen/pelvis noted with improving inflammation from diverticulitis - ? Given abdominal pain and elevated lactic acidosis consideration for ischemic colitis  · ED attending discussed with General surgery, no surgical interventions at this time  ED attending also discussed with gastroenterology  · Anticoagulated on Eliquis as an outpatient, continue to hold      · Last dose was 5/31, did not take any of her home medications day of admission 6/1  · Patient has required 3 units PRBCs total since admission- will continue to trend  · Has received 4 doses of Venofer, discontinued 6/6 due to possible infection given elevated WBC

## 2021-06-09 ENCOUNTER — TELEPHONE (OUTPATIENT)
Dept: FAMILY MEDICINE CLINIC | Facility: HOSPITAL | Age: 61
End: 2021-06-09

## 2021-06-09 RX ORDER — METRONIDAZOLE 500 MG/1
500 TABLET ORAL 2 TIMES DAILY
COMMUNITY
End: 2021-06-12

## 2021-06-09 RX ORDER — CIPROFLOXACIN 500 MG/1
500 TABLET, FILM COATED ORAL EVERY 12 HOURS SCHEDULED
COMMUNITY
End: 2021-06-12

## 2021-06-09 NOTE — TELEPHONE ENCOUNTER
This is mostly an FYI -     Updates to med list-  Cipro 500mg q 12 hours x 5 days - last dose will be 06/12  Flagyl 500mg q 12 hours x 5 days - completed 06/12  Lantus Solostar 100u/ML injecting 10 units daily at bedtime   Patient has not been checking sugars, nurse suggested tid as we have previously advised  Eliquis on hold until June 15th   Cipro does have an interaction w/ celexa, flecanide, trazadone - just wanted to make you aware  Any diarrhea patient was instructed to proceed to the ER

## 2021-06-13 DIAGNOSIS — E66.9 DIABETES MELLITUS TYPE 2 IN OBESE (HCC): ICD-10-CM

## 2021-06-13 DIAGNOSIS — E11.69 DIABETES MELLITUS TYPE 2 IN OBESE (HCC): ICD-10-CM

## 2021-06-13 RX ORDER — ATORVASTATIN CALCIUM 40 MG/1
TABLET, FILM COATED ORAL
Qty: 30 TABLET | Refills: 0 | Status: SHIPPED | OUTPATIENT
Start: 2021-06-13 | End: 2021-07-09

## 2021-06-14 DIAGNOSIS — I50.32 CHRONIC DIASTOLIC HEART FAILURE (HCC): ICD-10-CM

## 2021-06-14 DIAGNOSIS — Z79.899 ENCOUNTER FOR LONG-TERM (CURRENT) DRUG USE: ICD-10-CM

## 2021-06-15 RX ORDER — TORSEMIDE 20 MG/1
TABLET ORAL
Qty: 120 TABLET | Refills: 0 | Status: SHIPPED | OUTPATIENT
Start: 2021-06-15 | End: 2021-07-14 | Stop reason: SDUPTHER

## 2021-06-16 DIAGNOSIS — Z79.899 ENCOUNTER FOR LONG-TERM (CURRENT) DRUG USE: ICD-10-CM

## 2021-06-16 RX ORDER — TORSEMIDE 20 MG/1
TABLET ORAL
Qty: 120 TABLET | Refills: 0 | OUTPATIENT
Start: 2021-06-16

## 2021-06-16 RX ORDER — LORAZEPAM 0.5 MG/1
TABLET ORAL
Qty: 90 TABLET | Refills: 0 | OUTPATIENT
Start: 2021-06-16

## 2021-06-16 RX ORDER — LORAZEPAM 0.5 MG/1
TABLET ORAL
Qty: 90 TABLET | Refills: 0 | Status: SHIPPED | OUTPATIENT
Start: 2021-06-16 | End: 2021-07-14 | Stop reason: SDUPTHER

## 2021-06-17 ENCOUNTER — TELEPHONE (OUTPATIENT)
Dept: OTHER | Facility: OTHER | Age: 61
End: 2021-06-17

## 2021-06-17 NOTE — TELEPHONE ENCOUNTER
Patient cancelled her appointment for today  Her oxygen tank didn't charge, so she can't leave the house to come to her appointment  She declined rescheduling and will call back

## 2021-06-18 ENCOUNTER — OFFICE VISIT (OUTPATIENT)
Dept: FAMILY MEDICINE CLINIC | Facility: HOSPITAL | Age: 61
End: 2021-06-18
Payer: COMMERCIAL

## 2021-06-18 VITALS
TEMPERATURE: 97.5 F | OXYGEN SATURATION: 93 % | DIASTOLIC BLOOD PRESSURE: 78 MMHG | HEART RATE: 79 BPM | SYSTOLIC BLOOD PRESSURE: 118 MMHG | HEIGHT: 63 IN | WEIGHT: 283.4 LBS | BODY MASS INDEX: 50.21 KG/M2

## 2021-06-18 DIAGNOSIS — E66.9 DIABETES MELLITUS TYPE 2 IN OBESE (HCC): Primary | ICD-10-CM

## 2021-06-18 DIAGNOSIS — E11.69 DIABETES MELLITUS TYPE 2 IN OBESE (HCC): Primary | ICD-10-CM

## 2021-06-18 DIAGNOSIS — I48.91 ATRIAL FIBRILLATION, UNSPECIFIED TYPE (HCC): ICD-10-CM

## 2021-06-18 DIAGNOSIS — G47.33 OBSTRUCTIVE SLEEP APNEA: ICD-10-CM

## 2021-06-18 DIAGNOSIS — N18.2 TYPE 2 DIABETES MELLITUS WITH STAGE 2 CHRONIC KIDNEY DISEASE, WITHOUT LONG-TERM CURRENT USE OF INSULIN (HCC): ICD-10-CM

## 2021-06-18 DIAGNOSIS — K57.92 ACUTE DIVERTICULITIS: ICD-10-CM

## 2021-06-18 DIAGNOSIS — I50.32 CHRONIC DIASTOLIC CONGESTIVE HEART FAILURE (HCC): ICD-10-CM

## 2021-06-18 DIAGNOSIS — J44.9 CHRONIC OBSTRUCTIVE PULMONARY DISEASE, UNSPECIFIED COPD TYPE (HCC): ICD-10-CM

## 2021-06-18 DIAGNOSIS — E11.65 TYPE 2 DIABETES MELLITUS WITH HYPERGLYCEMIA, WITHOUT LONG-TERM CURRENT USE OF INSULIN (HCC): ICD-10-CM

## 2021-06-18 DIAGNOSIS — F32.1 CURRENT MODERATE EPISODE OF MAJOR DEPRESSIVE DISORDER WITHOUT PRIOR EPISODE (HCC): ICD-10-CM

## 2021-06-18 DIAGNOSIS — E11.22 TYPE 2 DIABETES MELLITUS WITH STAGE 2 CHRONIC KIDNEY DISEASE, WITHOUT LONG-TERM CURRENT USE OF INSULIN (HCC): ICD-10-CM

## 2021-06-18 PROCEDURE — 3008F BODY MASS INDEX DOCD: CPT | Performed by: INTERNAL MEDICINE

## 2021-06-18 PROCEDURE — 1111F DSCHRG MED/CURRENT MED MERGE: CPT | Performed by: FAMILY MEDICINE

## 2021-06-18 PROCEDURE — 99214 OFFICE O/P EST MOD 30 MIN: CPT | Performed by: FAMILY MEDICINE

## 2021-06-21 RX ORDER — INSULIN GLARGINE 100 [IU]/ML
15 INJECTION, SOLUTION SUBCUTANEOUS
Qty: 15 ML | Refills: 0
Start: 2021-06-21 | End: 2021-07-02 | Stop reason: SDUPTHER

## 2021-06-21 NOTE — PROGRESS NOTES
Patient is here for  TRANSITIONAL CARE MANAGEMENT      Problem List Items Addressed This Visit        Digestive    Acute diverticulitis       Endocrine    Type 2 diabetes mellitus, without long-term current use of insulin ([facility] )       Respiratory    Obstructive sleep apnea       Cardiovascular and Mediastinum    Chronic diastolic congestive heart failure (HCC)       Other    Current moderate episode of major depressive disorder without prior episode ([facility] )      Other Visit Diagnoses     Diabetes mellitus type 2 in obese ([facility] )    -  Primary    Chronic obstructive pulmonary disease, unspecified COPD type ([facility] )        Atrial fibrillation, unspecified type ([place] [address] )            Overall patient is feeling improved  Completing abx for acute diverticultis  Continues on low residue diet  Has GI fu scheduled  Will need fu cscope  Dm  Currently on insulin  Uncontrolled  Increase insulin to 15 units nightly  Will adjust as necessary  She will fu in 2 weeks  Copd  Stable  On o2  Followed by Pulmo  Chf  Euvolemic  No changes made  She is back on torsemide  Continue to monitor her weight  MDD  Stable  No chagnes made  No longer using ativan  Advised to continue off of this as much as possible  Return in about 2 weeks (around 7/2/2021)         To call our office if any concerns/questions at [phone]     -------------------------------------------------------------------------------------------------------------      TCM Call (since 5/21/2021)     Date and time call was made  6/8/2021  1:11 PM    Hospital care reviewed  Records reviewed    Patient was hospitialized at  [address] [place] [address]        Date of Admission  06/01/21    Date of discharge  06/07/21    Diagnosis  Sepsis    Disposition  Home; Home health services    Were the patients medications reviewed and updated  Yes    Current Symptoms  Shortness of breath    Shortness of breath severity  Mild      TCM Call (since 5/21/2021) Post hospital issues  None    Should patient be enrolled in anticoag monitoring? No    Scheduled for follow up? Yes    Did you obtain your prescribed medications  Yes    Do you need help managing your prescriptions or medications  No    Is transportation to your appointment needed  No    I have advised the patient to call PCP with any new or worsening symptoms  Isidra Kiser MA               Pt seen for TCM  Patient had GI bleeding found to be due to acute diverticultis  Was already being treated but appears to have recurrence  She ocntinues on a low resideu diet  Abdominal pain is improved  appetitte is good  No further bleeding  Copd  On oxygen  No worsening  Ongoing intermittent wheezing but has been stable  Chf  Was off of diuretics for a while  Had leg swelling  Back on the diuretics  No leg swelling  No signficant orthopnea  No chest pain  No palptations  Afib  On eliquis  Mdd  Feeling okay  No si/hi  Less anxiety  Has not been using ativan  Review of Systems   Constitutional: Negative  Negative for activity change, appetite change, chills, diaphoresis, fatigue and fever  HENT: Negative for congestion, facial swelling and sore throat  Respiratory: Negative  Negative for apnea, cough, chest tightness and shortness of breath  Cardiovascular: Negative  Negative for chest pain and palpitations  Gastrointestinal: Negative  Negative for abdominal distention, abdominal pain, blood in stool, constipation, diarrhea and nausea  Genitourinary: Negative  Negative for difficulty urinating, dysuria, flank pain and frequency         Social History     Socioeconomic History    Marital status: Significant Other     Spouse name: Not on file    Number of children: Not on file    Years of education: Not on file    Highest education level: Not on file   Occupational History    Not on file   Tobacco Use    Smoking status: Former Smoker Packs/day: 0 25     Years: 29 00     Pack years: 7 25     Types: Cigarettes     Start date: 200     Quit date: 12/10/2019     Years since quittin 5    Smokeless tobacco: Never Used   Vaping Use    Vaping Use: Never used   Substance and Sexual Activity    Alcohol use: Yes     Alcohol/week: 20 0 standard drinks     Types: 20 Cans of beer per week     Comment: about 6 coors light every night, stopped 3 weeks ago     Drug use: No    Sexual activity: Not Currently   Other Topics Concern    Not on file   Social History Narrative    Not on file     Social Determinants of Health     Financial Resource Strain:     Difficulty of Paying Living Expenses:    Food Insecurity:     Worried About Running Out of Food in the Last Year:     Ran Out of Food in the Last Year:    Transportation Needs:     Lack of Transportation (Medical):  Lack of Transportation (Non-Medical):    Physical Activity:     Days of Exercise per Week:     Minutes of Exercise per Session:    Stress:     Feeling of Stress :    Social Connections:     Frequency of Communication with Friends and Family:     Frequency of Social Gatherings with Friends and Family:     Attends Anglican Services:     Active Member of Clubs or Organizations:     Attends Club or Organization Meetings:     Marital Status:    Intimate Partner Violence:     Fear of Current or Ex-Partner:     Emotionally Abused:     Physically Abused:     Sexually Abused:                 Allergies   Allergen Reactions    Iron Dextran Swelling    Januvia [Sitagliptin] Shortness Of Breath    Jardiance [Empagliflozin] Shortness Of Breath    Clonazepam Other (See Comments)     Unknown reaction    Codeine Itching and Other (See Comments)     itch;mary kay morphine,takes ultram @home    Adhesive [Medical Tape] Itching     itching    Effexor [Venlafaxine] Itching    Lactose - Food Allergy GI Intolerance    Oxycodone-Acetaminophen Anxiety    Oxycodone-Acetaminophen Itching and Rash     Other reaction(s): Other (See Comments)  (PERCOCET) MILD RASH, not allergic to Acetaminophen            Vitals:    06/18/21 1331   BP: 118/78   Pulse: 79   Temp: 97 5 °F (36 4 °C)   SpO2: 93%     BP Readings from Last 3 Encounters:   06/18/21 118/78   06/07/21 117/60   05/24/21 140/65      Wt Readings from Last 3 Encounters:   06/18/21 129 kg (283 lb 6 4 oz)   06/07/21 (!) 137 kg (301 lb 9 4 oz)   05/13/21 127 kg (281 lb)        Physical Exam  Vitals and nursing note reviewed  Constitutional:       Appearance: Normal appearance  She is well-developed  She is obese  HENT:      Head: Normocephalic and atraumatic  Right Ear: Tympanic membrane, ear canal and external ear normal       Left Ear: Tympanic membrane, ear canal and external ear normal       Nose: Nose normal    Eyes:      Conjunctiva/sclera: Conjunctivae normal       Pupils: Pupils are equal, round, and reactive to light  Cardiovascular:      Rate and Rhythm: Normal rate and regular rhythm  Heart sounds: Normal heart sounds  Pulmonary:      Effort: Pulmonary effort is normal       Breath sounds: Normal breath sounds  Abdominal:      General: Bowel sounds are normal       Palpations: Abdomen is soft  Musculoskeletal:      Cervical back: Normal range of motion and neck supple  Skin:     General: Skin is warm and dry  Capillary Refill: Capillary refill takes less than 2 seconds  Neurological:      General: No focal deficit present  Mental Status: She is alert  Psychiatric:         Mood and Affect: Mood normal          Behavior: Behavior normal                      Return in about 2 weeks (around 7/2/2021)

## 2021-06-30 ENCOUNTER — TELEPHONE (OUTPATIENT)
Dept: FAMILY MEDICINE CLINIC | Facility: HOSPITAL | Age: 61
End: 2021-06-30

## 2021-06-30 NOTE — TELEPHONE ENCOUNTER
FYI
Finished with home care nurse visits    Couple more PT visits but home nursing component is finished
No pertinent past medical history    No pertinent past medical history

## 2021-07-02 ENCOUNTER — OFFICE VISIT (OUTPATIENT)
Dept: FAMILY MEDICINE CLINIC | Facility: HOSPITAL | Age: 61
End: 2021-07-02
Payer: COMMERCIAL

## 2021-07-02 VITALS
HEIGHT: 63 IN | OXYGEN SATURATION: 92 % | WEIGHT: 281 LBS | BODY MASS INDEX: 49.79 KG/M2 | DIASTOLIC BLOOD PRESSURE: 82 MMHG | HEART RATE: 87 BPM | TEMPERATURE: 97.4 F | SYSTOLIC BLOOD PRESSURE: 132 MMHG

## 2021-07-02 DIAGNOSIS — F32.1 CURRENT MODERATE EPISODE OF MAJOR DEPRESSIVE DISORDER WITHOUT PRIOR EPISODE (HCC): ICD-10-CM

## 2021-07-02 DIAGNOSIS — I50.32 CHRONIC DIASTOLIC CONGESTIVE HEART FAILURE (HCC): ICD-10-CM

## 2021-07-02 DIAGNOSIS — E66.01 CLASS 3 SEVERE OBESITY DUE TO EXCESS CALORIES WITH SERIOUS COMORBIDITY AND BODY MASS INDEX (BMI) OF 45.0 TO 49.9 IN ADULT (HCC): ICD-10-CM

## 2021-07-02 DIAGNOSIS — L30.9 DERMATITIS: ICD-10-CM

## 2021-07-02 DIAGNOSIS — E11.22 TYPE 2 DIABETES MELLITUS WITH STAGE 2 CHRONIC KIDNEY DISEASE, WITHOUT LONG-TERM CURRENT USE OF INSULIN (HCC): ICD-10-CM

## 2021-07-02 DIAGNOSIS — E11.65 TYPE 2 DIABETES MELLITUS WITH HYPERGLYCEMIA, UNSPECIFIED WHETHER LONG TERM INSULIN USE (HCC): ICD-10-CM

## 2021-07-02 DIAGNOSIS — J44.9 ASTHMA-COPD OVERLAP SYNDROME (HCC): ICD-10-CM

## 2021-07-02 DIAGNOSIS — J96.11 CHRONIC RESPIRATORY FAILURE WITH HYPOXIA (HCC): ICD-10-CM

## 2021-07-02 DIAGNOSIS — N18.2 TYPE 2 DIABETES MELLITUS WITH STAGE 2 CHRONIC KIDNEY DISEASE, WITHOUT LONG-TERM CURRENT USE OF INSULIN (HCC): ICD-10-CM

## 2021-07-02 DIAGNOSIS — B35.4 TINEA CORPORIS: Primary | ICD-10-CM

## 2021-07-02 PROCEDURE — 3079F DIAST BP 80-89 MM HG: CPT | Performed by: FAMILY MEDICINE

## 2021-07-02 PROCEDURE — 99214 OFFICE O/P EST MOD 30 MIN: CPT | Performed by: FAMILY MEDICINE

## 2021-07-02 PROCEDURE — 3075F SYST BP GE 130 - 139MM HG: CPT | Performed by: FAMILY MEDICINE

## 2021-07-02 PROCEDURE — 3066F NEPHROPATHY DOC TX: CPT | Performed by: INTERNAL MEDICINE

## 2021-07-02 PROCEDURE — 1036F TOBACCO NON-USER: CPT | Performed by: FAMILY MEDICINE

## 2021-07-02 RX ORDER — NYSTATIN 100000 U/G
CREAM TOPICAL 2 TIMES DAILY
Qty: 30 G | Refills: 0 | Status: SHIPPED | OUTPATIENT
Start: 2021-07-02 | End: 2022-06-17

## 2021-07-02 RX ORDER — BLOOD SUGAR DIAGNOSTIC
STRIP MISCELLANEOUS
Qty: 100 EACH | Refills: 3 | Status: SHIPPED | OUTPATIENT
Start: 2021-07-02 | End: 2021-11-08

## 2021-07-02 RX ORDER — PEN NEEDLE, DIABETIC 32GX 5/32"
NEEDLE, DISPOSABLE MISCELLANEOUS
Qty: 90 EACH | Refills: 1 | Status: SHIPPED | OUTPATIENT
Start: 2021-07-02 | End: 2021-11-15 | Stop reason: SDUPTHER

## 2021-07-02 RX ORDER — INSULIN GLARGINE 100 [IU]/ML
25 INJECTION, SOLUTION SUBCUTANEOUS
Qty: 15 ML | Refills: 0
Start: 2021-07-02 | End: 2021-08-02 | Stop reason: SDUPTHER

## 2021-07-02 RX ORDER — MOMETASONE FUROATE 1 MG/G
CREAM TOPICAL DAILY
Qty: 45 G | Refills: 0 | Status: SHIPPED | OUTPATIENT
Start: 2021-07-02 | End: 2021-07-09

## 2021-07-02 NOTE — TELEPHONE ENCOUNTER
Pt states pharmacy tells her that blood sugar test strips for [employer] are much cheaper through her ins that OTC  Can you please enter an order for these and send to [facility] on [address]    Pt can be reached at [phone]

## 2021-07-07 ENCOUNTER — TELEPHONE (OUTPATIENT)
Dept: GASTROENTEROLOGY | Facility: CLINIC | Age: 61
End: 2021-07-07

## 2021-07-07 ENCOUNTER — OFFICE VISIT (OUTPATIENT)
Dept: GASTROENTEROLOGY | Facility: CLINIC | Age: 61
End: 2021-07-07
Payer: COMMERCIAL

## 2021-07-07 VITALS
WEIGHT: 284 LBS | BODY MASS INDEX: 50.32 KG/M2 | SYSTOLIC BLOOD PRESSURE: 124 MMHG | HEIGHT: 63 IN | HEART RATE: 77 BPM | DIASTOLIC BLOOD PRESSURE: 68 MMHG

## 2021-07-07 DIAGNOSIS — D50.8 OTHER IRON DEFICIENCY ANEMIA: ICD-10-CM

## 2021-07-07 DIAGNOSIS — Z79.01 ENCOUNTER FOR CURRENT LONG-TERM USE OF ANTICOAGULANTS: ICD-10-CM

## 2021-07-07 DIAGNOSIS — J44.9 CHRONIC OBSTRUCTIVE PULMONARY DISEASE, UNSPECIFIED COPD TYPE (HCC): ICD-10-CM

## 2021-07-07 DIAGNOSIS — I27.20 PULMONARY HYPERTENSION (HCC): ICD-10-CM

## 2021-07-07 DIAGNOSIS — R10.13 EPIGASTRIC ABDOMINAL PAIN: ICD-10-CM

## 2021-07-07 DIAGNOSIS — Z86.010 PERSONAL HISTORY OF COLONIC POLYPS: ICD-10-CM

## 2021-07-07 DIAGNOSIS — K62.5 RECTAL BLEEDING: Primary | ICD-10-CM

## 2021-07-07 PROCEDURE — 99214 OFFICE O/P EST MOD 30 MIN: CPT | Performed by: INTERNAL MEDICINE

## 2021-07-07 PROCEDURE — 1036F TOBACCO NON-USER: CPT | Performed by: INTERNAL MEDICINE

## 2021-07-07 PROCEDURE — 3008F BODY MASS INDEX DOCD: CPT | Performed by: INTERNAL MEDICINE

## 2021-07-07 NOTE — H&P (VIEW-ONLY)
[facility] - Outpatient Follow-up Note  Isamar Pinedo 56 y o  female MRN: [id number]  Encounter: [id number]    ASSESSMENT AND PLAN:      1  Rectal bleeding  --With recent rectal bleeding  Patient had 2 hospitalizations within the last 2 months with significant bleeding  She required multiple units of packed red blood cells 3 on eac admissions and IV iron  Appear to have lower GI bleeding possibly from colitis  She also had diverticulitis at that time  - arrange colonoscopy at [address] [place] [address] Road  - risk reviewed with the patient    - differential diagnosis could have included a diverticular source although somewhat unusual in the face of diverticulitis, associated colitis, less likely colon neoplasm- patient does have a history of colon polyps and would be due for colonoscopy regardless    2  Other iron deficiency anemia  -- patient is taking oral iron  She did receive  Intravenous iron for 3 doses  Last 1 held secondary to vein injury  - patient is on oral liquid iron  Would like her to hold for about 5 days prior to procedure    - CBC and differential; Future  - Iron Panel (Includes Ferritin, Iron Sat%, Iron, and TIBC); Future  - Ferritin; Future      3  Encounter for current long-term use of anticoagulants    - patient on Eliquis for history of atrial fibrillation no history of CVA  Hold Eliquis for 2 days prior to procedures  Slight risk of embolic phenomenon or stroke while holding medications  Will clear with Cardiology  -Dr Leonor Holliday had followed in the past     4  Personal history of colonic polyps  -- see above  Patient has cecal polyp 2018    5  Pulmonary hypertension ([facility] )  -- patient with chronic lung disease increases risk to procedure    6  Chronic obstructive pulmonary disease, unspecified COPD type ([facility] )  -- patient does have chronic shortness of breath however reports that her situation is stable at this time    7   Epigastric abdominal pain  -- patient with ongoing epigastric abdominal pain  Much worse after eating patient feels full after eating  Gallbladder is out  No vomiting  - happens on a daily basis  - patient has diabetes  Two gastro paresis in the differential diagnosis    - EGD -SLUB -- do at same time as EGD because were holding patient's anticoagulation  - may need gastric emptying study  Patient reports  Blood sugars are not under optimal control at the      Followup Appointment: 3-4 mo   ______________________________________________________________________    Chief Complaint   Patient presents with   Irineo Farrow follow up     Colitis; diverticulitis     HPI:  Patient returns to the office for follow-up visit after recent hospitalization  Patient was admitted on 2 occasions in May and then again in early June with rectal bleeding  On the 2nd admission patient was treated for acute diverticulitis  Patient did require multiple transfusions of both admissions  She has not had any subsequent rectal bleeding but at the time of admission had a fair amount  Colonoscopy was postponed in the setting of acute diverticulitis  Patient is on chronic anticoagulation for history of CVA  She also has significant CHF and chronic respiratory failure with hypoxemia and requires chronic oxygen administration  Patient's last colonoscopy was in 2018 she had colorectal polyps at that time  The patient denies any major issues with lower abdominal pain at this time the port some epigastric abdominal distress  This occurs primarily after eating  She feels no dysphagia for feels somewhat poorly and full after eating  She has not had vomiting but does have some nausea  Patient does have longstanding diabetes states that the control is not optimized at this time  It should be noted the patient has been maintained on iron since her hospital discharge  She has not had recent laboratory studies  Most recent hemoglobin on record was from June 7th revealed a hemoglobin of 8 7  Patient also did review received intravenous iron when she was in the hospital     Historical Information   Past Medical History:   Diagnosis Date    Alcohol abuse 2020    Alcohol abuse 2020    Anemia     At[facility] fibrillation [facility]     Cervical radiculopathy     CHF (congestive heart failure) (HCC)     Chronic low back pain     Chronic obstructive asthma ([facility] ) 2018    Community acquired pneumonia     Community acquired pneumonia 2020    Depression with anxiety 3/9/2014    Diabetes mellitus ([facility] )     Diabetes mellitus with hyperglycemia (HCC)     Elevated liver enzymes     GERD (gastroesophageal reflux disease)     Hypersomnolence 10/28/2020    Hypokalemia 2018    Paresthesia of upper extremity     Plantar fasciitis     Restless leg     Restless legs syndrome 2014    Sexual dysfunction     Sleep apnea, obstructive     Tenosynovitis of finger     Tinea corporis     Tobacco use 2018    Currently smoking 3-4 cigarettes daily    Trigger middle finger of left hand 2017    Trigger ring finger of left hand 2017    Weakness of upper extremity      Past Surgical History:   Procedure Laterality Date    ABDOMINAL SURGERY      CARPAL TUNNEL RELEASE Bilateral      SECTION      CHOLECYSTECTOMY      laparoscopic    ESOPHAGOGASTRODUODENOSCOPY N/A 12/3/2018    Procedure: ESOPHAGOGASTRODUODENOSCOPY (EGD) AND COLONOSCOPY;  Surgeon: Alok Ritchie MD;  Location: [facility];  Service: Gastroenterology    GASTRIC BYPASS      for morbid obesity with gilda en y   [address] PRIMARY GANGLION WRIST Left 2017    Procedure: LONG FINGER GANGLION CYST EXCISION;  Surgeon: Dano Baxter MD;  [facility];  Service: Orthopedics    NH INCISE FINGER TENDON SHEATH Left 2017    Procedure: THUMB, LONG, RING, TRIGGER FINGER RELEASE;  Surgeon: Dano Baxter MD;  [facility];  Service: Orthopedics    SHOULDER SURGERY       Social History     Substance and Sexual Activity   Alcohol Use Yes    Alcohol/week: 20 0 standard drinks    Types: 20 Cans of beer per week    Comment: about 6 coors light every night, stopped 3 weeks ago      Social History     Substance and Sexual Activity   Drug Use No     Social History     Tobacco Use   Smoking Status Former Smoker    Packs/day: 0 25    Years: 29 00    Pack years: 7 25    Types: Cigarettes    Start date: 200    Quit date: 12/10/2019    Years since quittin 5   Smokeless Tobacco Never Used     Family History   Problem Relation Age of Onset    Alzheimer's disease Mother     Atrial fibrillation Mother     Dementia Mother     Heart disease Mother         heart problem    Seizures Mother     Parkinsonism Father     Arthritis Father     Atrial fibrillation Father     Colon cancer Maternal Grandmother [de-identified]    Substance Abuse Neg Hx         mother,father    Mental illness Neg Hx         mother,father    Colon polyps Neg Hx          Current Outpatient Medications:     albuterol (PROVENTIL HFA,VENTOLIN HFA) 90 mcg/act inhaler    apixaban (Eliquis) 5 mg    ascorbic acid (VITAMIN C) 1000 MG tablet    atorvastatin (LIPITOR) 40 mg tablet    Blood Glucose Monitoring Suppl ([employer] CONTOUR NEXT MONITOR) w/Device KIT    Cholecalciferol 1000 units tablet    citalopram (CeleXA) 20 mg tablet    cyanocobalamin (VITAMIN B-12) 100 mcg tablet    flecainide (TAMBOCOR) 100 mg tablet    fluticasone (FLONASE) 50 mcg/act nasal spray    fluticasone (FLOVENT HFA) 220 mcg/act inhaler    glimepiride (AMARYL) 4 mg tablet    glucose blood (Contour Next Test) test strip    glycopyrrolate-formoterol (BEVESPI AEROSPHERE) 9-4 8 MCG/ACT inhaler    insulin glargine (Lantus SoloStar) 100 units/mL injection pen    Insulin Pen Needle (BD Pen Needle Love 2nd Gen) 32G X 4 MM MISC    levalbuterol (XOPENEX) 1 25 mg/0 5 mL nebulizer solution    Good PhotoSTChickRx II LANCETS MISC    loratadine (Claritin) 10 mg tablet    LORazepam (ATIVAN) 0 5 mg tablet    Magnesium 400 MG CAPS    metFORMIN (GLUCOPHAGE-XR) 500 mg 24 hr tablet    metoprolol succinate (TOPROL-XL) 50 mg 24 hr tablet    mometasone (ELOCON) 0 1 % cream    montelukast (SINGULAIR) 10 mg tablet    naloxone (NARCAN) 4 mg/0 1 mL nasal spray    nystatin (MYCOSTATIN) cream    omeprazole (PriLOSEC) 40 MG capsule    potassium chloride (K-DUR,KLOR-CON) 20 mEq tablet    torsemide (DEMADEX) 20 mg tablet    traZODone (DESYREL) 150 mg tablet    ferrous sulfate (FeroSul) 220 (44 Fe) mg/5 mL solution  Allergies   Allergen Reactions    Iron Dextran Swelling    Januvia [Sitagliptin] Shortness Of Breath    Jardiance [Empagliflozin] Shortness Of Breath    Clonazepam Other (See Comments)     Unknown reaction    Codeine Itching and Other (See Comments)     itch;mary kay morphine,takes ultram @home    Adhesive [Medical Tape] Itching     itching    Effexor [Venlafaxine] Itching    Lactose - Food Allergy GI Intolerance    Oxycodone-Acetaminophen Anxiety    Oxycodone-Acetaminophen Itching and Rash     Other reaction(s): Other (See Comments)  (PERCOCET) MILD RASH, not allergic to Acetaminophen      Reviewed medications and allergies and updated as indicated  GI please see above   Respiratory shortness of breath with mild exertion   Extremities positive for edema  The rest of 10 point review of systems essentially negative    PHYSICAL EXAM:    Blood pressure 124/68, pulse 77, height 5' 3" (1 6 m), weight 129 kg (284 lb), not currently breastfeeding  Body mass index is 50 31 kg/m²  General Appearance: NAD, cooperative, alert conjunctiva pale-wearing nasal oxygen  Eyes: Anicteric-  ENT:  Normocephalic, atraumatic, normal mucosa  Neck:  Supple, symmetrical, trachea midline  Resp:  Decreased breath sound no rales, rhonchi or wheezing; respirations unlabored   CV:  S1 S2, Regular rate and rhythm; no murmur, rub, or gallop    GI: Soft, non-tender, non-distended, obese; normal bowel sounds; no masses, no organomegaly   Rectal: Deferred  Musculoskeletal: No cyanosis, clubbing--positive for peripheral edema 1+ to the knees    Skin:  No jaundice, rashes, or lesions   Heme/Lymph: No palpable cervical lymphadenopathy  Psych: Normal affect, good eye contact  Neuro: No gross deficits, AAOx3    Lab Results:   Lab Results   Component Value Date    WBC 12 42 (H) 06/07/2021    HGB 8 7 (L) 06/07/2021    HCT 29 3 (L) 06/07/2021    MCV 84 06/07/2021     06/07/2021     Lab Results   Component Value Date     09/22/2017    K 3 4 (L) 06/07/2021     06/07/2021    CO2 36 (H) 06/07/2021    BUN 6 06/07/2021    CREATININE 0 65 06/07/2021    GLUCOSE 131 (H) 09/22/2017    CALCIUM 8 1 (L) 06/07/2021    CORRECTEDCA 8 8 06/05/2021    AST 21 06/05/2021    ALT 47 06/05/2021    ALKPHOS 147 (H) 06/05/2021    PROT 6 6 09/22/2017    BILITOT 0 4 09/22/2017    EGFR 95 06/07/2021     Lab Results   Component Value Date    IRON 17 (L) 06/02/2021    TIBC 347 06/02/2021    FERRITIN 16 06/02/2021

## 2021-07-07 NOTE — PATIENT INSTRUCTIONS
[facility] - Outpatient Follow-up Note  Isamar Pinedo 56 y o  female MRN: [id number]  Encounter: [id number]    ASSESSMENT AND PLAN:      1  Rectal bleeding  --With recent rectal bleeding  Patient had 2 hospitalizations within the last 2 months with significant bleeding  She required multiple units of packed red blood cells 3 on at the EG admissions and IV iron  Appear to have lower GI bleeding possibly from colitis  She also had diverticulitis at that time  - arrange colonoscopy [address]  - risk reviewed with the patient    - differential diagnosis could have included a diverticular source although somewhat unusual in the face of diverticulitis, associated colitis, less likely colon neoplasm- patient does have a history of colon polyps and would be due for colonoscopy regardless    2  Other iron deficiency anemia  -- patient is taking oral iron  She did receive  Intravenous iron for 3 doses  Last 1 held secondary to vein injury  - patient is on oral liquid iron  Would like her to hold for about 5 days prior to procedure    - CBC and differential; Future  - Iron Panel (Includes Ferritin, Iron Sat%, Iron, and TIBC); Future  - Ferritin; Future      3  Encounter for current long-term use of anticoagulants    - patient on Eliquis for history of atrial fibrillation no history of CVA  Hold Eliquis for 2 days prior to procedures  Slight risk of embolic phenomenon or stroke while holding medications  Will clear with Cardiology    4  Personal history of colonic polyps  -- see above  Patient has cecal polyp 2018    5  Pulmonary hypertension ([facility] )  -- patient with chronic lung disease increases risk to procedure    6  Chronic obstructive pulmonary disease, unspecified COPD type ([facility] )  -- patient does have chronic shortness of breath however reports that her situation is stable at this time    7  Epigastric abdominal pain  -- patient with ongoing epigastric abdominal pain    Much worse after eating patient feels full after eating  Gallbladder is out  No vomiting  - happens on a daily basis  - patient has diabetes  Two gastro paresis in the differential diagnosis    - EGD -SLUB -- do at same time as EGD because were holding patient's anticoagulation  - may need gastric emptying study    Patient reports  Blood sugars are not under optimal control at the      Followup Appointment: 3-4 mo

## 2021-07-07 NOTE — TELEPHONE ENCOUNTER
Why does your doctor want you to have this procedure? Anemia; rectal bleeding    Do you have kidney disease?  no  If yes, are you on dialysis :     Have you had diverticulitis within the past 2 months? Yes, May/June 2021    Are you diabetic?  yes  If yes, insulin dependent: IDDM  If yes, provide diabetic instructions sheet     Do take iron supplements? yes  If yes, instruct patient to hold iron supplement for 7 days prior    Are you on a blood thinner? yes   Was the blood thinner sheet complete and faxed to cardiologist yes  Plavix (clopidogrel), Coumadin (warfarin), Lovenox (enoxaparin), Xarelto (rivaroxaban), Pradaxa(dabigatran), Eliquis(apixaban) Savaysa/Lixiana (edoxapan)    Do you have an automatic implantable cardiac defibrillator (AICD)/pacemaker (GVH)? no  Was AICD/pacemaker sheet completed and faxed to cardiologist? no    Are you on home oxygen? yes  If yes, continuous or nocturnal: continuous 4L    Have you been treated for MRSA, VRE or any communicable diseases? no    Heart attack, stroke, or stent within 3 months? no  Schedule at Hospital if within 3-6 months   Use nitroglycerin for chest pain in the last 6 months? no    History of organ  transplant?  no   If yes, notify Endo      History of neck/throat/tongue surgery or cancer? Yes, Tonsillectomy as a child  IF yes, notify Endo      Any problems with anesthesia in the past? no     Was stool C diff ordered?  no Stool specimen needs to be completed prior to procedure    Do have any facial or body piercings?no     Do you have a latex allergy? no     Do have an allergy to metals? (Bravo study only) no     If pediatric patient, was consent faxed to pediatrician no     Patient rights reviewed yes     Combo OV prep completed; colyte and diabetic instructions reviewed and provided to pt; rx forwarded to provider

## 2021-07-07 NOTE — LETTER
July 9, 2021     Sarah Munguia MD  [address]  [facility]  [address] [facility] [address]    Patient: Celia Lua   YOB: 1960   Date of Visit: 7/7/2021       Dear Dr Fang Benjamin: Thank you for referring Vero Martin to me for evaluation  Below are my notes for this consultation  If you have questions, please do not hesitate to call me  I look forward to following your patient along with you  Sincerely,        Raven Bowden MD        CC: DO Raven Montgomery MD  7/9/2021 12:39 PM  Incomplete  [facility] - Outpatient Follow-up Note  Celia Lua 56 y o  female MRN: [id number]  Encounter: [id number]    ASSESSMENT AND PLAN:      1  Rectal bleeding  --With recent rectal bleeding  Patient had 2 hospitalizations within the last 2 months with significant bleeding  She required multiple units of packed red blood cells 3 on eac admissions and IV iron  Appear to have lower GI bleeding possibly from colitis  She also had diverticulitis at that time  - arrange colonoscopy at [address] [place] [address]  - risk reviewed with the patient    - differential diagnosis could have included a diverticular source although somewhat unusual in the face of diverticulitis, associated colitis, less likely colon neoplasm- patient does have a history of colon polyps and would be due for colonoscopy regardless    2  Other iron deficiency anemia  -- patient is taking oral iron  She did receive  Intravenous iron for 3 doses  Last 1 held secondary to vein injury  - patient is on oral liquid iron  Would like her to hold for about 5 days prior to procedure    - CBC and differential; Future  - Iron Panel (Includes Ferritin, Iron Sat%, Iron, and TIBC); Future  - Ferritin; Future      3  Encounter for current long-term use of anticoagulants    - patient on Eliquis for history of atrial fibrillation no history of CVA  Hold Eliquis for 2 days prior to procedures    Slight risk of embolic phenomenon or stroke while holding medications  Will clear with Cardiology  -Dr Nicky Grimaldo had followed in the past     4  Personal history of colonic polyps  -- see above  Patient has cecal polyp 2018    5  Pulmonary hypertension ([facility] )  -- patient with chronic lung disease increases risk to procedure    6  Chronic obstructive pulmonary disease, unspecified COPD type ([facility] )  -- patient does have chronic shortness of breath however reports that her situation is stable at this time    7  Epigastric abdominal pain  -- patient with ongoing epigastric abdominal pain  Much worse after eating patient feels full after eating  Gallbladder is out  No vomiting  - happens on a daily basis  - patient has diabetes  Two gastro paresis in the differential diagnosis    - EGD -SLUB -- do at same time as EGD because were holding patient's anticoagulation  - may need gastric emptying study  Patient reports  Blood sugars are not under optimal control at the      Followup Appointment: 3-4 mo   ______________________________________________________________________    Chief Complaint   Patient presents with   Germaine Vásquez follow up     Colitis; diverticulitis     HPI:  Patient returns to the office for follow-up visit after recent hospitalization  Patient was admitted on 2 occasions in May and then again in early June with rectal bleeding  On the 2nd admission patient was treated for acute diverticulitis  Patient did require multiple transfusions of both admissions  She has not had any subsequent rectal bleeding but at the time of admission had a fair amount  Colonoscopy was postponed in the setting of acute diverticulitis  Patient is on chronic anticoagulation for history of CVA  She also has significant CHF and chronic respiratory failure with hypoxemia and requires chronic oxygen administration  Patient's last colonoscopy was in 2018 she had colorectal polyps at that time    The patient denies any major issues with lower abdominal pain at this time the port some epigastric abdominal distress  This occurs primarily after eating  She feels no dysphagia for feels somewhat poorly and full after eating  She has not had vomiting but does have some nausea  Patient does have longstanding diabetes states that the control is not optimized at this time  It should be noted the patient has been maintained on iron since her hospital discharge  She has not had recent laboratory studies  Most recent hemoglobin on record was from  revealed a hemoglobin of 8 7    Patient also did review received intravenous iron when she was in the hospital     Historical Information   Past Medical History:   Diagnosis Date    Alcohol abuse 2020    Alcohol abuse 2020    Anemia     [facility] fibrillation ([facility] )     Cervical radiculopathy     [facility]congest[facility] failure) [facility]     Chronic low back pain     Chronic obstructive asthma ([facility] ) 2018    Community acquired pneumonia     Community acquired pneumonia 2020    Depression with anxiety 3/9/2014    Diabetes mellitus ([facility] )     Diabetes mellitus with hyperglycemia (HCC)     Elevated liver enzymes     GERD (gastroesophageal reflux disease)     Hypersomnolence 10/28/2020    Hypokalemia 2018    Paresthesia of upper extremity     Plantar fasciitis     Restless leg     Restless legs syndrome 2014    Sexual dysfunction     Sleep apnea, obstructive     Tenosynovitis of finger     Tinea corporis     Tobacco use 2018    Currently smoking 3-4 cigarettes daily    Trigger middle finger of left hand 2017    Trigger ring finger of left hand 2017    Weakness of upper extremity      Past Surgical History:   Procedure Laterality Date    ABDOMINAL SURGERY      CARPAL TUNNEL RELEASE Bilateral      SECTION      CHOLECYSTECTOMY      laparoscopic    ESOPHAGOGASTRODUODENOSCOPY N/A 12/3/2018    Procedure: ESOPHAGOGASTRODUODENOSCOPY (EGD) AND COLONOSCOPY;  Surgeon: Larry Miles MD;  [facility];  Service: Gastroenterology    GASTRIC BYPASS      for morbid obesity with gilda en y    HERNIA REPAIR      HYSTERECTOMY      IL EXCIS PRIMARY GANGLION WRIST Left 2017    Procedure: LONG FINGER GANGLION CYST EXCISION;  Surgeon: Jeanine Dan MD;  [facility];  Service: Orthopedics    [place] INCISE FINGER TENDON SHEATH Left 2017    Procedure: Jamilah Mort, RING, TRIGGER FINGER RELEASE;  Surgeon: Jeanine Dan MD;  [facility];  Service: Orthopedics    SHOULDER SURGERY       Social History     Substance and Sexual Activity   Alcohol Use Yes    Alcohol/week: 20 0 standard drinks    Types: 20 Cans of beer per week    Comment: about 6 coors light every night, stopped 3 weeks ago      Social History     Substance and Sexual Activity   Drug Use No     Social History     Tobacco Use   Smoking Status Former Smoker    Packs/day: 0 25    Years: 29 00    Pack years: 7 25    Types: Cigarettes    Start date: 200    Quit date: 12/10/2019    Years since quittin 5   Smokeless Tobacco Never Used     Family History   Problem Relation Age of Onset    Alzheimer's disease Mother     Atrial fibrillation Mother     Dementia Mother     Heart disease Mother         heart problem    Seizures Mother     Parkinsonism Father     Arthritis Father     Atrial fibrillation Father     Colon cancer Maternal Grandmother [de-identified]    Substance Abuse Neg Hx         mother,father    Mental illness Neg Hx         mother,father    Colon polyps Neg Hx          Current Outpatient Medications:     albuterol (PROVENTIL HFA,VENTOLIN HFA) 90 mcg/act inhaler    apixaban (Eliquis) 5 mg    ascorbic acid (VITAMIN C) 1000 MG tablet    atorvastatin (LIPITOR) 40 mg tablet    Blood Glucose Monitoring Suppl (NABILA CONTOUR NEXT MONITOR) w/Device KIT    Cholecalciferol 1000 units tablet    citalopram (CeleXA) 20 mg tablet    cyanocobalamin (VITAMIN B-12) 100 mcg tablet    flecainide (TAMBOCOR) 100 mg tablet    fluticasone (FLONASE) 50 mcg/act nasal spray    fluticasone (FLOVENT HFA) 220 mcg/act inhaler    glimepiride (AMARYL) 4 mg tablet    glucose blood (Contour Next Test) test strip    glycopyrrolate-formoterol (BEVESPI AEROSPHERE) 9-4 8 MCG/ACT inhaler    insulin glargine (Lantus SoloStar) 100 units/mL injection pen    Insulin Pen Needle (BD Pen Needle Love 2nd Gen) 32G X 4 MM MISC    levalbuterol (XOPENEX) 1 25 mg/0 5 mL nebulizer solution    LIFESCAN UNISTIK II LANCETS MISC    loratadine (Claritin) 10 mg tablet    LORazepam (ATIVAN) 0 5 mg tablet    Magnesium 400 MG CAPS    metFORMIN (GLUCOPHAGE-XR) 500 mg 24 hr tablet    metoprolol succinate (TOPROL-XL) 50 mg 24 hr tablet    mometasone (ELOCON) 0 1 % cream    montelukast (SINGULAIR) 10 mg tablet    naloxone (NARCAN) 4 mg/0 1 mL nasal spray    nystatin (MYCOSTATIN) cream    omeprazole (PriLOSEC) 40 MG capsule    potassium chloride (K-DUR,KLOR-CON) 20 mEq tablet    torsemide (DEMADEX) 20 mg tablet    traZODone (DESYREL) 150 mg tablet    ferrous sulfate (FeroSul) 220 (44 Fe) mg/5 mL solution  Allergies   Allergen Reactions    Iron Dextran Swelling    Januvia [Sitagliptin] Shortness Of Breath    Jardiance [Empagliflozin] Shortness Of Breath    Clonazepam Other (See Comments)     Unknown reaction    Codeine Itching and Other (See Comments)     itch;mary kay morphine,takes ultram @home    Adhesive [Medical Tape] Itching     itching    Effexor [Venlafaxine] Itching    Lactose - Food Allergy GI Intolerance    Oxycodone-Acetaminophen Anxiety    Oxycodone-Acetaminophen Itching and Rash     Other reaction(s):  Other (See Comments)  (PERCOCET) MILD RASH, not allergic to Acetaminophen      Reviewed medications and allergies and updated as indicated  GI please see above   Respiratory shortness of breath with mild exertion   Extremities positive for edema  The rest of 10 point review of systems essentially negative    PHYSICAL EXAM:    Blood pressure 124/68, pulse 77, height 5' 3" (1 6 m), weight 129 kg (284 lb), not currently breastfeeding  Body mass index is 50 31 kg/m²  General Appearance: NAD, cooperative, alert conjunctiva pale-wearing nasal oxygen  Eyes: Anicteric-  ENT:  Normocephalic, atraumatic, normal mucosa  Neck:  Supple, symmetrical, trachea midline  Resp:  Decreased breath sound no rales, rhonchi or wheezing; respirations unlabored   CV:  S1 S2, Regular rate and rhythm; no murmur, rub, or gallop  GI:  Soft, non-tender, non-distended, obese; normal bowel sounds; no masses, no organomegaly   Rectal: Deferred  Musculoskeletal: No cyanosis, clubbing--positive for peripheral edema 1+ to the knees  Skin:  No jaundice, rashes, or lesions   Heme/Lymph: No palpable cervical lymphadenopathy  Psych: Normal affect, good eye contact  Neuro: No gross deficits, AAOx3    Lab Results:   Lab Results   Component Value Date    WBC 12 42 (H) 06/07/2021    HGB 8 7 (L) 06/07/2021    HCT 29 3 (L) 06/07/2021    MCV 84 06/07/2021     06/07/2021     Lab Results   Component Value Date     09/22/2017    K 3 4 (L) 06/07/2021     06/07/2021    CO2 36 (H) 06/07/2021    BUN 6 06/07/2021    CREATININE 0 65 06/07/2021    GLUCOSE 131 (H) 09/22/2017    CALCIUM 8 1 (L) 06/07/2021    CORRECTEDCA 8 8 06/05/2021    AST 21 06/05/2021    ALT 47 06/05/2021    ALKPHOS 147 (H) 06/05/2021    PROT 6 6 09/22/2017    BILITOT 0 4 09/22/2017    EGFR 95 06/07/2021     Lab Results   Component Value Date    IRON 17 (L) 06/02/2021    TIBC 347 06/02/2021    FERRITIN 16 06/02/2021         Maribeth Venegas MD  7/7/2021  2:39 PM  Sign when Signing Visit  [address] [facility] - Outpatient Follow-up Note  Isamar Pinedo 56 y o  female MRN: [id number]  Encounter: [id number]    ASSESSMENT AND PLAN:      1  Rectal bleeding  --With recent rectal bleeding    Patient had 2 hospitalizations within the last 2 months with significant bleeding  She required multiple units of packed red blood cells 3 on at the [facility] admissions and IV iron  Appear to have lower GI bleeding possibly from colitis  She also had diverticulitis at that time  - arrange colonoscopy at [address]  - risk reviewed with the patient    - differential diagnosis could have included a diverticular source although somewhat unusual in the face of diverticulitis, associated colitis, less likely colon neoplasm- patient does have a history of colon polyps and would be due for colonoscopy regardless    2  Other iron deficiency anemia  -- patient is taking oral iron  She did receive  Intravenous iron for 3 doses  Last 1 held secondary to vein injury  - patient is on oral liquid iron  Would like her to hold for about 5 days prior to procedure    - CBC and differential; Future  - Iron Panel (Includes Ferritin, Iron Sat%, Iron, and TIBC); Future  - Ferritin; Future      3  Encounter for current long-term use of anticoagulants    - patient on Eliquis for history of atrial fibrillation no history of CVA  Hold Eliquis for 2 days prior to procedures  Slight risk of embolic phenomenon or stroke while holding medications  Will clear with Cardiology    4  Personal history of colonic polyps  -- see above  Patient has cecal polyp 2018    5  Pulmonary hypertension ([facility] )  -- patient with chronic lung disease increases risk to procedure    6  Chronic obstructive pulmonary disease, unspecified COPD type ([facility] )  -- patient does have chronic shortness of breath however reports that her situation is stable at this time    7  Epigastric abdominal pain  -- patient with ongoing epigastric abdominal pain  Much worse after eating patient feels full after eating  Gallbladder is out  No vomiting  - happens on a daily basis  - patient has diabetes    Two gastro paresis in the differential diagnosis    - EGD -SLUB -- do at same time as EGD because were holding patient's anticoagulation  - may need gastric emptying study    Patient reports  Blood sugars are not under optimal control at the      Followup Appointment: 3-4 mo   ______________________________________________________________________    Chief Complaint   Patient presents with   Skeet  follow up     Colitis; diverticulitis     HPI:      Historical Information   Past Medical History:   Diagnosis Date    Alcohol abuse 2020    Alcohol abuse 2020    Anemia     Atrial fibrillation ([facility] )     Cervical radiculopathy     [facility]     Chronic low back pain     Chronic obstructive asthma ([facility] ) 2018    Community acquired pneumonia     Community acquired pneumonia 2020    Depression with anxiety 3/9/2014    Diabetes mellitus ([facility] )     Diabetes mellitus with hyperglycemia ([facility]     Elevated liver enzymes     GERD (gastroesophageal reflux disease)     Hypersomnolence 10/28/2020    Hypokalemia 2018    Paresthesia of upper extremity     Plantar fasciitis     Restless leg     Restless legs syndrome 2014    Sexual dysfunction     Sleep apnea, obstructive     Tenosynovitis of finger     Tinea corporis     Tobacco use 2018    Currently smoking 3-4 cigarettes daily    Trigger middle finger of left hand 2017    Trigger ring finger of left hand 2017    Weakness of upper extremity      Past Surgical History:   Procedure Laterality Date    ABDOMINAL SURGERY      CARPAL TUNNEL RELEASE Bilateral      SECTION      CHOLECYSTECTOMY      laparoscopic    ESOPHAGOGASTRODUODENOSCOPY N/A 12/3/2018    Procedure: ESOPHAGOGASTRODUODENOSCOPY (EGD) AND COLONOSCOPY;  Surgeon: Sarah Leon MD;  [facility];  Service: Gastroenterology    GASTRIC BYPASS      for morbid obesity with gilda en y   [address] WRIST Left 2017    Procedure: LONG FINGER GANGLION CYST EXCISION;  Surgeon: Dixie Mcrae MD;  [facility];  Service: Orthopedics    [place] INCISE FINGER TENDON SHEATH Left 2017    Procedure: THUMB, LONG, RING, TRIGGER FINGER RELEASE;  Surgeon: Dixie Mcrae MD;  [facility];  Service: Orthopedics    SHOULDER SURGERY       Social History     Substance and Sexual Activity   Alcohol Use Yes    Alcohol/week: 20 0 standard drinks    Types: 20 Cans of beer per week    Comment: about 6 coors light every night, stopped 3 weeks ago      Social History     Substance and Sexual Activity   Drug Use No     Social History     Tobacco Use   Smoking Status Former Smoker    Packs/day: 0 25    Years: 29 00    Pack years: 7 25    Types: Cigarettes    Start date: 200    Quit date: 12/10/2019    Years since quittin 5   Smokeless Tobacco Never Used     Family History   Problem Relation Age of Onset    Alzheimer's disease Mother     Atrial fibrillation Mother     Dementia Mother     Heart disease Mother         heart problem    Seizures Mother     Parkinsonism Father     Arthritis Father     Atrial fibrillation Father     Colon cancer Maternal Grandmother [de-identified]    Substance Abuse Neg Hx         mother,father    Mental illness Neg Hx         mother,father    Colon polyps Neg Hx          Current Outpatient Medications:     albuterol (PROVENTIL HFA,VENTOLIN HFA) 90 mcg/act inhaler    apixaban (Eliquis) 5 mg    ascorbic acid (VITAMIN C) 1000 MG tablet    atorvastatin (LIPITOR) 40 mg tablet    Blood Glucose Monitoring Suppl (NABILA CONTOUR NEXT MONITOR) w/Device KIT    Cholecalciferol 1000 units tablet    citalopram (CeleXA) 20 mg tablet    cyanocobalamin (VITAMIN B-12) 100 mcg tablet    flecainide (TAMBOCOR) 100 mg tablet    fluticasone (FLONASE) 50 mcg/act nasal spray    fluticasone (FLOVENT HFA) 220 mcg/act inhaler    glimepiride (AMARYL) 4 mg tablet    glucose blood (Contour Next Test) test strip    glycopyrrolate-formoterol (BEVESPI AEROSPHERE) 9-4 8 MCG/ACT inhaler    insulin glargine (Lantus SoloStar) 100 units/mL injection pen    Insulin Pen Needle (BD Pen Needle Love 2nd Gen) 32G X 4 MM MISC    levalbuterol (XOPENEX) 1 25 mg/0 5 mL nebulizer solution    LIFESCAN UNISTIK II LANCETS MISC    loratadine (Claritin) 10 mg tablet    LORazepam (ATIVAN) 0 5 mg tablet    Magnesium 400 MG CAPS    metFORMIN (GLUCOPHAGE-XR) 500 mg 24 hr tablet    metoprolol succinate (TOPROL-XL) 50 mg 24 hr tablet    mometasone (ELOCON) 0 1 % cream    montelukast (SINGULAIR) 10 mg tablet    naloxone (NARCAN) 4 mg/0 1 mL nasal spray    nystatin (MYCOSTATIN) cream    omeprazole (PriLOSEC) 40 MG capsule    potassium chloride (K-DUR,KLOR-CON) 20 mEq tablet    torsemide (DEMADEX) 20 mg tablet    traZODone (DESYREL) 150 mg tablet    ferrous sulfate (FeroSul) 220 (44 Fe) mg/5 mL solution  Allergies   Allergen Reactions    Iron Dextran Swelling    Januvia [Sitagliptin] Shortness Of Breath    Jardiance [Empagliflozin] Shortness Of Breath    Clonazepam Other (See Comments)     Unknown reaction    Codeine Itching and Other (See Comments)     itch;mary kay morphine,takes ultram @home    Adhesive [Medical Tape] Itching     itching    Effexor [Venlafaxine] Itching    Lactose - Food Allergy GI Intolerance    Oxycodone-Acetaminophen Anxiety    Oxycodone-Acetaminophen Itching and Rash     Other reaction(s): Other (See Comments)  (PERCOCET) MILD RASH, not allergic to Acetaminophen      Reviewed medications and allergies and updated as indicated    PHYSICAL EXAM:    Blood pressure 124/68, pulse 77, height 5' 3" (1 6 m), weight 129 kg (284 lb), not currently breastfeeding  Body mass index is 50 31 kg/m²  General Appearance: NAD, cooperative, alert  Eyes: Anicteric, PERRLA, EOMI  ENT:  Normocephalic, atraumatic, normal mucosa      Neck:  Supple, symmetrical, trachea midline  Resp:  Clear to auscultation bilaterally; no rales, rhonchi or wheezing; respirations unlabored   CV:  S1 S2, Regular rate and rhythm; no murmur, rub, or gallop  GI:  Soft, non-tender, non-distended; normal bowel sounds; no masses, no organomegaly   Rectal: Deferred  Musculoskeletal: No cyanosis, clubbing or edema  Normal ROM  Skin:  No jaundice, rashes, or lesions   Heme/Lymph: No palpable cervical lymphadenopathy  Psych: Normal affect, good eye contact  Neuro: No gross deficits, AAOx3    Lab Results:   Lab Results   Component Value Date    WBC 12 42 (H) 06/07/2021    HGB 8 7 (L) 06/07/2021    HCT 29 3 (L) 06/07/2021    MCV 84 06/07/2021     06/07/2021     Lab Results   Component Value Date     09/22/2017    K 3 4 (L) 06/07/2021     06/07/2021    CO2 36 (H) 06/07/2021    BUN 6 06/07/2021    CREATININE 0 65 06/07/2021    GLUCOSE 131 (H) 09/22/2017    CALCIUM 8 1 (L) 06/07/2021    CORRECTEDCA 8 8 06/05/2021    AST 21 06/05/2021    ALT 47 06/05/2021    ALKPHOS 147 (H) 06/05/2021    PROT 6 6 09/22/2017    BILITOT 0 4 09/22/2017    EGFR 95 06/07/2021     Lab Results   Component Value Date    IRON 17 (L) 06/02/2021    TIBC 347 06/02/2021    FERRITIN 16 06/02/2021     Lab Results   Component Value Date    LIPASE 72 (L) 06/01/2021       Radiology Results:   No results found

## 2021-07-07 NOTE — PROGRESS NOTES
[facility]-up [facility]  Celia Lua 56 y o  female MRN: [id number]  Encounter: [id number]    ASSESSMENT AND PLAN:      1  Rectal bleeding  --With recent rectal bleeding  Patient had 2 hospitalizations within the last 2 months with significant bleeding  She required multiple units of packed red blood cells 3 on eac admissions and IV iron  Appear to have lower GI bleeding possibly from colitis  She also had diverticulitis at that time  - arrange colonoscopy at [address] [place] [address] Road  - risk reviewed with the patient    - differential diagnosis could have included a diverticular source although somewhat unusual in the face of diverticulitis, associated colitis, less likely colon neoplasm- patient does have a history of colon polyps and would be due for colonoscopy regardless    2  Other iron deficiency anemia  -- patient is taking oral iron  She did receive  Intravenous iron for 3 doses  Last 1 held secondary to vein injury  - patient is on oral liquid iron  Would like her to hold for about 5 days prior to procedure    - CBC and differential; Future  - Iron Panel (Includes Ferritin, Iron Sat%, Iron, and TIBC); Future  - Ferritin; Future      3  Encounter for current long-term use of anticoagulants    - patient on Eliquis for history of atrial fibrillation no history of CVA  Hold Eliquis for 2 days prior to procedures  Slight risk of embolic phenomenon or stroke while holding medications  Will clear with Cardiology  -Dr Ragsdale had followed in the past     4  Personal history of colonic polyps  -- see above  Patient has cecal polyp 2018    5  Pulmonary hypertension ([facility] )  -- patient with chronic lung disease increases risk to procedure    6  Chronic obstructive pulmonary disease, unspecified [facility] ([facility] )  -- patient does have chronic shortness of breath however reports that her situation is stable at this time    7   Epigastric abdominal pain  -- patient with ongoing epigastric abdominal pain  Much worse after eating patient feels full after eating  Gallbladder is out  No vomiting  - happens on a daily basis  - patient has diabetes  Two gastro paresis in the differential diagnosis    - EGD -SLUB -- do at same time as EGD because were holding patient's anticoagulation  - may need gastric emptying study  Patient reports  Blood sugars are not under optimal control at the      Followup Appointment: 3-4 mo   ______________________________________________________________________    Chief Complaint   Patient presents with   Cornelious Rabia follow up     Colitis; diverticulitis     HPI:  Patient returns to the office for follow-up visit after recent hospitalization  Patient was admitted on 2 occasions in May and then again in early June with rectal bleeding  On the 2nd admission patient was treated for acute diverticulitis  Patient did require multiple transfusions of both admissions  She has not had any subsequent rectal bleeding but at the time of admission had a fair amount  Colonoscopy was postponed in the setting of acute diverticulitis  Patient is on chronic anticoagulation for history of CVA  She also has significant CHF and chronic respiratory failure with hypoxemia and requires chronic oxygen administration  Patient's last colonoscopy was in 2018 she had colorectal polyps at that time  The patient denies any major issues with lower abdominal pain at this time the port some epigastric abdominal distress  This occurs primarily after eating  She feels no dysphagia for feels somewhat poorly and full after eating  She has not had vomiting but does have some nausea  Patient does have longstanding diabetes states that the control is not optimized at this time  It should be noted the patient has been maintained on iron since her hospital discharge  She has not had recent laboratory studies  Most recent hemoglobin on record was from June 7th revealed a hemoglobin of 8 7  Patient also did review received intravenous iron when she was in the hospital     Historical Information   Past Medical History:   Diagnosis Date    Alcohol abuse 2020    Alcohol abuse 2020    Anemia     [facility]     Cervical radiculopathy     CHF (congestive heart failure) (HCC)     Chronic low back pain     Chronic obstructive [facility] 2018    Community acquired pneumonia     Community acquired pneumonia 2020    Depression with anxiety 3/9/2014    Diabetes mellitus ([facility] )     Diabetes mellitus with hyperglycemia (HCC)     Elevated liver enzymes     GERD (gastroesophageal reflux disease)     Hypersomnolence 10/28/2020    Hypokalemia 2018    Paresthesia of upper extremity     Plantar fasciitis     Restless leg     Restless legs syndrome 2014    Sexual dysfunction     Sleep apnea, obstructive     Tenosynovitis of finger     Tinea corporis     Tobacco use 2018    Currently smoking 3-4 cigarettes daily    Trigger middle finger of left hand 2017    Trigger ring finger of left hand 2017    Weakness of upper extremity      Past Surgical History:   Procedure Laterality Date    ABDOMINAL SURGERY      CARPAL TUNNEL RELEASE Bilateral      SECTION      CHOLECYSTECTOMY      laparoscopic    ESOPHAGOGASTRODUODENOSCOPY N/A 12/3/2018    Procedure: ESOPHAGOGASTRODUODENOSCOPY (EGD) AND COLONOSCOPY;  Surgeon: Foster Gonzalez MD;  [facility];  Service: Gastroenterology    GASTRIC BYPASS      for morbid obesity with gilda en y   [address] PRIMARY GANGLION WRIST Left 2017    Procedure: LONG FINGER GANGLION CYST EXCISION;  Surgeon: Herminio Mendoza MD;  [facility];  Service: Orthopedics    [place] INCISE FINGER TENDON SHEATH Left 2017    Procedure: THUMB, LONG, RING, TRIGGER FINGER RELEASE;  Surgeon: Herminio eMndoza MD;  [facility];  Service: Orthopedics    SHOULDER SURGERY       Social History     Substance and Sexual Activity   Alcohol Use Yes    Alcohol/week: 20 0 standard drinks    Types: 20 Cans of beer per week    Comment: about 6 coors light every night, stopped 3 weeks ago      Social History     Substance and Sexual Activity   Drug Use No     Social History     Tobacco Use   Smoking Status Former Smoker    Packs/day: 0 25    Years: 29 00    Pack years: 7 25    Types: Cigarettes    Start date: 200    Quit date: 12/10/2019    Years since quittin 5   Smokeless Tobacco Never Used     Family History   Problem Relation Age of Onset    Alzheimer's disease Mother     Atrial fibrillation Mother     Dementia Mother     Heart disease Mother         heart problem    Seizures Mother     Parkinsonism Father     Arthritis Father     Atrial fibrillation Father     Colon cancer Maternal Grandmother [de-identified]    Substance Abuse Neg Hx         mother,father    Mental illness Neg Hx         mother,father    Colon polyps Neg Hx          Current Outpatient Medications:     albuterol (PROVENTIL HFA,VENTOLIN HFA) 90 mcg/act inhaler    apixaban (Eliquis) 5 mg    ascorbic acid (VITAMIN C) 1000 MG tablet    atorvastatin (LIPITOR) 40 mg tablet    Blood Glucose Monitoring Suppl (Respectance CONTOUR NEXT MONITOR) w/Device KIT    Cholecalciferol 1000 units tablet    citalopram (CeleXA) 20 mg tablet    cyanocobalamin (VITAMIN B-12) 100 mcg tablet    flecainide (TAMBOCOR) 100 mg tablet    fluticasone (FLONASE) 50 mcg/act nasal spray    fluticasone (FLOVENT HFA) 220 mcg/act inhaler    glimepiride (AMARYL) 4 mg tablet    glucose blood (Contour Next Test) test strip    glycopyrrolate-formoterol (BEVESPI AEROSPHERE) 9-4 8 MCG/ACT inhaler    insulin glargine (Lantus SoloStar) 100 units/mL injection pen    Insulin Pen Needle (BD Pen Needle Love 2nd Gen) 32G X 4 MM MISC    levalbuterol (XOPENEX) 1 25 mg/0 5 mL nebulizer solution    PowerPlay MobileSTLIVELENZ II LANCETS MISC    loratadine (Claritin) 10 mg tablet    LORazepam (ATIVAN) 0 5 mg tablet    Magnesium 400 MG CAPS    metFORMIN (GLUCOPHAGE-XR) 500 mg 24 hr tablet    metoprolol succinate (TOPROL-XL) 50 mg 24 hr tablet    mometasone (ELOCON) 0 1 % cream    montelukast (SINGULAIR) 10 mg tablet    naloxone (NARCAN) 4 mg/0 1 mL nasal spray    nystatin (MYCOSTATIN) cream    omeprazole (PriLOSEC) 40 MG capsule    potassium chloride (K-DUR,KLOR-CON) 20 mEq tablet    torsemide (DEMADEX) 20 mg tablet    traZODone (DESYREL) 150 mg tablet    ferrous sulfate (FeroSul) 220 (44 Fe) mg/5 mL solution  Allergies   Allergen Reactions    Iron Dextran Swelling    Januvia [Sitagliptin] Shortness Of Breath    Jardiance [Empagliflozin] Shortness Of Breath    Clonazepam Other (See Comments)     Unknown reaction    Codeine Itching and Other (See Comments)     itch;mary kay morphine,takes ultram @home    Adhesive [Medical Tape] Itching     itching    Effexor [Venlafaxine] Itching    Lactose - Food Allergy GI Intolerance    Oxycodone-Acetaminophen Anxiety    Oxycodone-Acetaminophen Itching and Rash     Other reaction(s): Other (See Comments)  (PERCOCET) MILD RASH, not allergic to Acetaminophen      Reviewed medications and allergies and updated as indicated  GI please see above   Respiratory shortness of breath with mild exertion   Extremities positive for edema  The rest of 10 point review of systems essentially negative    PHYSICAL EXAM:    Blood pressure 124/68, pulse 77, height 5' 3" (1 6 m), weight 129 kg (284 lb), not currently breastfeeding  Body mass index is 50 31 kg/m²  General Appearance: NAD, cooperative, alert conjunctiva pale-wearing nasal oxygen  Eyes: Anicteric-  ENT:  Normocephalic, atraumatic, normal mucosa  Neck:  Supple, symmetrical, trachea midline  Resp:  Decreased breath sound no rales, rhonchi or wheezing; respirations unlabored   CV:  S1 S2, Regular rate and rhythm; no murmur, rub, or gallop    GI: Soft, non-tender, non-distended, obese; normal bowel sounds; no masses, no organomegaly   Rectal: Deferred  Musculoskeletal: No cyanosis, clubbing--positive for peripheral edema 1+ to the knees    Skin:  No jaundice, rashes, or lesions   Heme/Lymph: No palpable cervical lymphadenopathy  Psych: Normal affect, good eye contact  Neuro: No gross deficits, AAOx3    Lab Results:   Lab Results   Component Value Date    WBC 12 42 (H) 06/07/2021    HGB 8 7 (L) 06/07/2021    HCT 29 3 (L) 06/07/2021    MCV 84 06/07/2021     06/07/2021     Lab Results   Component Value Date     09/22/2017    K 3 4 (L) 06/07/2021     06/07/2021    CO2 36 (H) 06/07/2021    BUN 6 06/07/2021    CREATININE 0 65 06/07/2021    GLUCOSE 131 (H) 09/22/2017    CALCIUM 8 1 (L) 06/07/2021    CORRECTEDCA 8 8 06/05/2021    AST 21 06/05/2021    ALT 47 06/05/2021    ALKPHOS 147 (H) 06/05/2021    PROT 6 6 09/22/2017    BILITOT 0 4 09/22/2017    EGFR 95 06/07/2021     Lab Results   Component Value Date    IRON 17 (L) 06/02/2021    TIBC 347 06/02/2021    FERRITIN 16 06/02/2021

## 2021-07-07 NOTE — PROGRESS NOTES
[facility] - Outpatient Follow-up Note  Krystle Zaragoza 56 y o  female MRN: [id number]  Encounter: [id number]    ASSESSMENT AND PLAN:      There are no diagnoses linked to this encounter      Followup Appointment: ***  ______________________________________________________________________    Chief Complaint   Patient presents with   Heron Lamb follow up     Colitis; diverticulitis     HPI:  ***    Historical Information   Past Medical History:   Diagnosis Date    Alcohol abuse 2020    Alcohol abuse 2020    Anemia     Atrial fibrillation ([facility] )     Cervical radiculopathy     CH[facility]congest[facility] failure) ([facility])     Chronic low back pain     Chronic obstructive asthma ([facility] ) 2018    Community acquired pneumonia     Community acquired pneumonia 2020    Depression with anxiety 3/9/2014    Diabetes mellitus ([facility] )     Diabetes mellitus with hyperglycemia ([facility] )     Elevated liver enzymes     GERD (gastroesophageal reflux disease)     Hypersomnolence 10/28/2020    Hypokalemia 2018    Paresthesia of upper extremity     Plantar fasciitis     Restless leg     Restless legs syndrome 2014    Sexual dysfunction     Sleep apnea, obstructive     Tenosynovitis of finger     Tinea corporis     Tobacco use 2018    Currently smoking 3-4 cigarettes daily    Trigger middle finger of left hand 2017    Trigger ring finger of left hand 2017    Weakness of upper extremity      Past Surgical History:   Procedure Laterality Date    ABDOMINAL SURGERY      CARPAL TUNNEL RELEASE Bilateral      SECTION      CHOLECYSTECTOMY      laparoscopic    ESOPHAGOGASTRODUODENOSCOPY N/A 12/3/2018    Procedure: ESOPHAGOGASTRODUODENOSCOPY (EGD) AND COLONOSCOPY;  Surgeon: Shala Vazquez MD;  Location:  MAIN OR;  Service: Gastroenterology    GASTRIC BYPASS      for morbid obesity with gilda en y   [address] PRIMARY GANGLION WRIST Left 2017    Procedure: LONG FINGER GANGLION CYST EXCISION;  Surgeon: Christina Rutledge MD;  [facility];  Service: Orthopedics    [place] INCISE FINGER TENDON SHEATH Left 2017    Procedure: [place], RING, TRIGGER FINGER RELEASE;  Surgeon: Christina Rutledge MD;  [facility];  Service: Orthopedics    SHOULDER SURGERY       Social History     Substance and Sexual Activity   Alcohol Use Yes    Alcohol/week: 20 0 standard drinks    Types: 20 Cans of beer per week    Comment: about 6 coors light every night, stopped 3 weeks ago      Social History     Substance and Sexual Activity   Drug Use No     Social History     Tobacco Use   Smoking Status Former Smoker    Packs/day: 0 25    Years: 29 00    Pack years: 7 25    Types: Cigarettes    Start date: [address] [facility]    Quit date: 12/10/2019    Years since quittin 5   Smokeless Tobacco Never Used     Family History   Problem Relation Age of Onset    Alzheimer's disease Mother     Atrial fibrillation Mother     Dementia Mother     Heart disease Mother         heart problem    Seizures Mother     Parkinsonism Father     Arthritis Father     Atrial fibrillation Father     Colon cancer Maternal Grandmother [de-identified]    Substance Abuse Neg Hx         mother,father    Mental illness Neg Hx         mother,father    Colon polyps Neg Hx          Current Outpatient Medications:     albuterol (PROVENTIL HFA,VENTOLIN HFA) 90 mcg/act inhaler    apixaban (Eliquis) 5 mg    ascorbic acid (VITAMIN C) 1000 MG tablet    atorvastatin (LIPITOR) 40 mg tablet    Blood Glucose Monitoring Suppl (Search[employer] CONTOUR NEXT MONITOR) w/Device KIT    Cholecalciferol 1000 units tablet    citalopram (CeleXA) 20 mg tablet    cyanocobalamin (VITAMIN B-12) 100 mcg tablet    flecainide (TAMBOCOR) 100 mg tablet    fluticasone (FLONASE) 50 mcg/act nasal spray    fluticasone (FLOVENT HFA) 220 mcg/act inhaler    glimepiride (AMARYL) 4 mg tablet    glucose blood (Contour Next Test) test strip    glycopyrrolate-formoterol (BEVESPI AEROSPHERE) 9-4 8 MCG/ACT inhaler    insulin glargine (Lantus SoloStar) 100 units/mL injection pen    Insulin Pen Needle (BD Pen Needle Love 2nd Gen) 32G X 4 MM MISC    levalbuterol (XOPENEX) 1 25 mg/0 5 mL nebulizer solution    LIFESCAN UNISTIK II LANCETS MISC    loratadine (Claritin) 10 mg tablet    LORazepam (ATIVAN) 0 5 mg tablet    Magnesium 400 MG CAPS    metFORMIN (GLUCOPHAGE-XR) 500 mg 24 hr tablet    metoprolol succinate (TOPROL-XL) 50 mg 24 hr tablet    mometasone (ELOCON) 0 1 % cream    montelukast (SINGULAIR) 10 mg tablet    naloxone (NARCAN) 4 mg/0 1 mL nasal spray    nystatin (MYCOSTATIN) cream    omeprazole (PriLOSEC) 40 MG capsule    potassium chloride (K-DUR,KLOR-CON) 20 mEq tablet    torsemide (DEMADEX) 20 mg tablet    traZODone (DESYREL) 150 mg tablet    ferrous sulfate (FeroSul) 220 (44 Fe) mg/5 mL solution  Allergies   Allergen Reactions    Iron Dextran Swelling    Januvia [Sitagliptin] Shortness Of Breath    Jardiance [Empagliflozin] Shortness Of Breath    Clonazepam Other (See Comments)     Unknown reaction    Codeine Itching and Other (See Comments)     itch;mary kay morphine,takes ultram @home    Adhesive [Medical Tape] Itching     itching    Effexor [Venlafaxine] Itching    Lactose - Food Allergy GI Intolerance    Oxycodone-Acetaminophen Anxiety    Oxycodone-Acetaminophen Itching and Rash     Other reaction(s): Other (See Comments)  (PERCOCET) MILD RASH, not allergic to Acetaminophen      Reviewed medications and allergies and updated as indicated    PHYSICAL EXAM:    Blood pressure 124/68, pulse 77, height 5' 3" (1 6 m), weight 129 kg (284 lb), not currently breastfeeding  Body mass index is 50 31 kg/m²  General Appearance: NAD, cooperative, alert  Eyes: Anicteric, PERRLA, EOMI  ENT:  Normocephalic, atraumatic, normal mucosa      Neck:  Supple, symmetrical, trachea midline  Resp:  Clear to auscultation bilaterally; no rales, rhonchi or wheezing; respirations unlabored   CV:  S1 S2, Regular rate and rhythm; no murmur, rub, or gallop  GI:  Soft, non-tender, non-distended; normal bowel sounds; no masses, no organomegaly   Rectal: Deferred  Musculoskeletal: No cyanosis, clubbing or edema  Normal ROM  Skin:  No jaundice, rashes, or lesions   Heme/Lymph: No palpable cervical lymphadenopathy  Psych: Normal affect, good eye contact  Neuro: No gross deficits, AAOx3    Lab Results:   Lab Results   Component Value Date    WBC 12 42 (H) 06/07/2021    HGB 8 7 (L) 06/07/2021    HCT 29 3 (L) 06/07/2021    MCV 84 06/07/2021     06/07/2021     Lab Results   Component Value Date     09/22/2017    K 3 4 (L) 06/07/2021     06/07/2021    CO2 36 (H) 06/07/2021    BUN 6 06/07/2021    CREATININE 0 65 06/07/2021    GLUCOSE 131 (H) 09/22/2017    CALCIUM 8 1 (L) 06/07/2021    CORRECTEDCA 8 8 06/05/2021    AST 21 06/05/2021    ALT 47 06/05/2021    ALKPHOS 147 (H) 06/05/2021    PROT 6 6 09/22/2017    BILITOT 0 4 09/22/2017    EGFR 95 06/07/2021     Lab Results   Component Value Date    IRON 17 (L) 06/02/2021    TIBC 347 06/02/2021    FERRITIN 16 06/02/2021     Lab Results   Component Value Date    LIPASE 72 (L) 06/01/2021       Radiology Results:   No results found

## 2021-07-07 NOTE — PROGRESS NOTES
Assessment/Plan:      Problem List Items Addressed This Visit        Endocrine    Type 2 diabetes mellitus, without long-term current use of insulin (HCC)    Relevant Medications    insulin glargine (Lantus SoloStar) 100 units/mL injection pen       Respiratory    Asthma-COPD overlap syndrome (HCC)    Chronic respiratory failure with hypoxia (HCC)       Cardiovascular and Mediastinum    Chronic diastolic congestive heart failure (HCC)       Other    Class 3 severe obesity in adult [facility])    Current moderate episode of major depressive disorder without prior episode ([address] )      Other Visit Diagnoses     Tinea corporis    -  Primary    Relevant Medications    Insulin Pen Needle (BD Pen Needle Love 2nd Gen) 32G X 4 MM MISC    nystatin (MYCOSTATIN) cream    Dermatitis        Relevant Medications    mometasone (ELOCON) 0 1 % cream    nystatin (MYCOSTATIN) cream           Plan/Discussion:  Dermatitis over the face  Low potencey steroid to be used as a short course  Tinea  Nystatin cream      Copd/asthma  Stable  On oxygen  Fu with pulmo  Diabetes  Stable but uncontrolled  Increase Insulin by 5 units x 3 days then another 5 units for total of 25 units daily  Continue to monitor BS regularly  Continue efforts with dietary control  Fu in another 2 weeks for reevaluation  May need meal time insulin  BMI Counseling: Body mass index is 49 78 kg/m²  The BMI is above normal  Nutrition recommendations include decreasing portion sizes, encouraging healthy choices of fruits and vegetables and moderation in carbohydrate intake  Exercise recommendations include exercising 3-5 times per week  Subjective:   Chief Complaint   Patient presents with    Follow-up     2 week     Rash     Under armpits         Patient ID: Alexandra Leal is a 56 y o  female  Pt here for fu      overal she is doing okay  Reports no increase in sob, leg swelling  On oxygen  Breathing continues to improve     Will be following up with pulmo  Has a rash on her face  Unclear as to why  No new skin procuts or substance used on the face  No exposeure to enviornmental substances  No new food  Diabetes  Blood sugars logs are reviewed  Continues mostly in the 200s  No episodes of hypoglycemia  Taking her oral medication as well as basal insulin  The following portions of the patient's history were reviewed and updated as appropriate: allergies, current medications, past family history, past medical history, past social history, past surgical history and problem list     Review of Systems   Constitutional: Negative  Negative for activity change, appetite change, chills, diaphoresis, fatigue and fever  HENT: Negative for congestion, facial swelling and sore throat  Respiratory: Negative  Negative for apnea, cough, chest tightness and shortness of breath  Cardiovascular: Negative  Negative for chest pain and palpitations  Gastrointestinal: Negative  Negative for abdominal distention, abdominal pain, blood in stool, constipation, diarrhea and nausea  Genitourinary: Negative  Negative for difficulty urinating, dysuria, flank pain and frequency  Objective:  Vitals:    07/02/21 1224   BP: 132/82   Pulse: 87   Temp: (!) 97 4 °F (36 3 °C)   SpO2: 92%   Weight: 127 kg (281 lb)   Height: 5' 3" (1 6 m)     BP Readings from Last 6 Encounters:   07/02/21 132/82   06/18/21 118/78   06/07/21 117/60   05/24/21 140/65   05/13/21 130/60   05/05/21 155/69      Wt Readings from Last 6 Encounters:   07/02/21 127 kg (281 lb)   06/18/21 129 kg (283 lb 6 4 oz)   06/07/21 (!) 137 kg (301 lb 9 4 oz)   05/13/21 127 kg (281 lb)   05/02/21 129 kg (285 lb 6 4 oz)   04/26/21 131 kg (289 lb)             Physical Exam  Vitals and nursing note reviewed  Constitutional:       Appearance: Normal appearance  She is well-developed  She is obese  HENT:      Head: Normocephalic and atraumatic        Right Ear: External ear normal       Left Ear: External ear normal       Nose: Nose normal    Eyes:      Conjunctiva/sclera: Conjunctivae normal       Pupils: Pupils are equal, round, and reactive to light  Neck:      Thyroid: No thyromegaly  Trachea: No tracheal deviation  Cardiovascular:      Rate and Rhythm: Normal rate and regular rhythm  Heart sounds: Normal heart sounds  No murmur heard  Pulmonary:      Effort: Pulmonary effort is normal  No respiratory distress  Breath sounds: Normal breath sounds  No wheezing  Abdominal:      General: Bowel sounds are normal       Palpations: Abdomen is soft  Musculoskeletal:         General: Normal range of motion  Cervical back: Normal range of motion and neck supple  Skin:     General: Skin is warm and dry  Capillary Refill: Capillary refill takes less than 2 seconds  Neurological:      General: No focal deficit present  Mental Status: She is alert and oriented to person, place, and time     Psychiatric:         Mood and Affect: Mood normal          Behavior: Behavior normal

## 2021-07-09 DIAGNOSIS — E66.9 DIABETES MELLITUS TYPE 2 IN OBESE (HCC): ICD-10-CM

## 2021-07-09 DIAGNOSIS — E11.69 DIABETES MELLITUS TYPE 2 IN OBESE (HCC): ICD-10-CM

## 2021-07-09 RX ORDER — ATORVASTATIN CALCIUM 40 MG/1
TABLET, FILM COATED ORAL
Qty: 30 TABLET | Refills: 5 | Status: SHIPPED | OUTPATIENT
Start: 2021-07-09 | End: 2021-11-04 | Stop reason: SDUPTHER

## 2021-07-13 LAB
BASOPHILS # BLD AUTO: 0.1 X10E3/UL (ref 0–0.2)
BASOPHILS NFR BLD AUTO: 1 %
EOSINOPHIL # BLD AUTO: 0.3 X10E3/UL (ref 0–0.4)
EOSINOPHIL NFR BLD AUTO: 2 %
ERYTHROCYTE [DISTWIDTH] IN BLOOD BY AUTOMATED COUNT: 17 % (ref 11.7–15.4)
FERRITIN SERPL-MCNC: 44 NG/ML (ref 15–150)
FERRITIN SERPL-MCNC: 50 NG/ML (ref 15–150)
HCT VFR BLD AUTO: 38.3 % (ref 34–46.6)
HGB BLD-MCNC: 11.4 G/DL (ref 11.1–15.9)
IMM GRANULOCYTES # BLD: 0.1 X10E3/UL (ref 0–0.1)
IMM GRANULOCYTES NFR BLD: 1 %
IRON SATN MFR SERPL: 8 % (ref 15–55)
IRON SERPL-MCNC: 32 UG/DL (ref 27–139)
LYMPHOCYTES # BLD AUTO: 1.4 X10E3/UL (ref 0.7–3.1)
LYMPHOCYTES NFR BLD AUTO: 11 %
MCH RBC QN AUTO: 24.4 PG (ref 26.6–33)
MCHC RBC AUTO-ENTMCNC: 29.8 G/DL (ref 31.5–35.7)
MCV RBC AUTO: 82 FL (ref 79–97)
MONOCYTES # BLD AUTO: 0.8 X10E3/UL (ref 0.1–0.9)
MONOCYTES NFR BLD AUTO: 6 %
NEUTROPHILS # BLD AUTO: 9.8 X10E3/UL (ref 1.4–7)
NEUTROPHILS NFR BLD AUTO: 79 %
PLATELET # BLD AUTO: 362 X10E3/UL (ref 150–450)
RBC # BLD AUTO: 4.68 X10E6/UL (ref 3.77–5.28)
TIBC SERPL-MCNC: 414 UG/DL (ref 250–450)
UIBC SERPL-MCNC: 382 UG/DL (ref 118–369)
WBC # BLD AUTO: 12.4 X10E3/UL (ref 3.4–10.8)

## 2021-07-14 ENCOUNTER — TELEPHONE (OUTPATIENT)
Dept: FAMILY MEDICINE CLINIC | Facility: HOSPITAL | Age: 61
End: 2021-07-14

## 2021-07-14 DIAGNOSIS — Z79.899 ENCOUNTER FOR LONG-TERM (CURRENT) DRUG USE: ICD-10-CM

## 2021-07-14 DIAGNOSIS — I50.32 CHRONIC DIASTOLIC HEART FAILURE (HCC): ICD-10-CM

## 2021-07-14 DIAGNOSIS — F32.1 CURRENT MODERATE EPISODE OF MAJOR DEPRESSIVE DISORDER WITHOUT PRIOR EPISODE (HCC): ICD-10-CM

## 2021-07-14 RX ORDER — LORAZEPAM 0.5 MG/1
TABLET ORAL
Qty: 90 TABLET | Refills: 0 | Status: SHIPPED | OUTPATIENT
Start: 2021-07-14 | End: 2021-08-02 | Stop reason: SDUPTHER

## 2021-07-14 RX ORDER — TORSEMIDE 20 MG/1
TABLET ORAL
Qty: 120 TABLET | Refills: 0 | Status: SHIPPED | OUTPATIENT
Start: 2021-07-14 | End: 2021-08-09

## 2021-07-14 RX ORDER — CITALOPRAM 20 MG/1
20 TABLET ORAL DAILY
Qty: 30 TABLET | Refills: 1 | Status: SHIPPED | OUTPATIENT
Start: 2021-07-14 | End: 2021-08-20

## 2021-07-14 NOTE — TELEPHONE ENCOUNTER
Pt states she needs refills for LORazepam (ATIVAN) 0 5 mg tablet,  citalopram (CeleXA) 20 mg tablet and torsemide (DEMADEX) 20 mg tablet sent to [facility] on [address]

## 2021-07-16 NOTE — RESULT ENCOUNTER NOTE
I called the patient and  spoke with the patient - iron levels - saturation is low   Hb stable - repeat cbc and iron panel 3 mo - patient is on liquid iron - advised twice per day

## 2021-07-25 DIAGNOSIS — E11.65 TYPE 2 DIABETES MELLITUS WITH HYPERGLYCEMIA, WITHOUT LONG-TERM CURRENT USE OF INSULIN (HCC): ICD-10-CM

## 2021-07-25 RX ORDER — GLIMEPIRIDE 4 MG/1
TABLET ORAL
Qty: 30 TABLET | Refills: 0 | Status: SHIPPED | OUTPATIENT
Start: 2021-07-25 | End: 2021-08-19

## 2021-07-26 DIAGNOSIS — I48.91 ATRIAL FIBRILLATION, UNSPECIFIED TYPE (HCC): ICD-10-CM

## 2021-07-30 PROCEDURE — 3066F NEPHROPATHY DOC TX: CPT | Performed by: FAMILY MEDICINE

## 2021-08-02 DIAGNOSIS — E11.22 TYPE 2 DIABETES MELLITUS WITH STAGE 2 CHRONIC KIDNEY DISEASE, WITHOUT LONG-TERM CURRENT USE OF INSULIN (HCC): ICD-10-CM

## 2021-08-02 DIAGNOSIS — Z79.899 ENCOUNTER FOR LONG-TERM (CURRENT) DRUG USE: ICD-10-CM

## 2021-08-02 DIAGNOSIS — N18.2 TYPE 2 DIABETES MELLITUS WITH STAGE 2 CHRONIC KIDNEY DISEASE, WITHOUT LONG-TERM CURRENT USE OF INSULIN (HCC): ICD-10-CM

## 2021-08-02 RX ORDER — INSULIN GLARGINE 100 [IU]/ML
25 INJECTION, SOLUTION SUBCUTANEOUS
Qty: 15 ML | Refills: 0 | Status: CANCELLED
Start: 2021-08-02

## 2021-08-03 ENCOUNTER — TELEPHONE (OUTPATIENT)
Dept: SURGERY | Facility: HOSPITAL | Age: 61
End: 2021-08-03

## 2021-08-04 ENCOUNTER — ANESTHESIA (OUTPATIENT)
Dept: GASTROENTEROLOGY | Facility: HOSPITAL | Age: 61
End: 2021-08-04

## 2021-08-04 ENCOUNTER — HOSPITAL ENCOUNTER (OUTPATIENT)
Dept: GASTROENTEROLOGY | Facility: HOSPITAL | Age: 61
Setting detail: OUTPATIENT SURGERY
Discharge: HOME | End: 2021-08-04
Attending: INTERNAL MEDICINE
Payer: COMMERCIAL

## 2021-08-04 ENCOUNTER — ANESTHESIA EVENT (OUTPATIENT)
Dept: GASTROENTEROLOGY | Facility: HOSPITAL | Age: 61
End: 2021-08-04

## 2021-08-04 VITALS
HEART RATE: 85 BPM | DIASTOLIC BLOOD PRESSURE: 59 MMHG | RESPIRATION RATE: 18 BRPM | SYSTOLIC BLOOD PRESSURE: 131 MMHG | TEMPERATURE: 100.3 F | OXYGEN SATURATION: 94 %

## 2021-08-04 DIAGNOSIS — R10.13 EPIGASTRIC ABDOMINAL PAIN: ICD-10-CM

## 2021-08-04 DIAGNOSIS — K62.5 RECTAL BLEEDING: ICD-10-CM

## 2021-08-04 LAB — GLUCOSE SERPL-MCNC: 152 MG/DL (ref 65–140)

## 2021-08-04 PROCEDURE — 43239 EGD BIOPSY SINGLE/MULTIPLE: CPT | Performed by: INTERNAL MEDICINE

## 2021-08-04 PROCEDURE — 88305 TISSUE EXAM BY PATHOLOGIST: CPT | Performed by: PATHOLOGY

## 2021-08-04 PROCEDURE — 45385 COLONOSCOPY W/LESION REMOVAL: CPT | Performed by: INTERNAL MEDICINE

## 2021-08-04 PROCEDURE — 82948 REAGENT STRIP/BLOOD GLUCOSE: CPT

## 2021-08-04 RX ORDER — SODIUM CHLORIDE 9 MG/ML
INJECTION, SOLUTION INTRAVENOUS CONTINUOUS PRN
Status: DISCONTINUED | OUTPATIENT
Start: 2021-08-04 | End: 2021-08-04

## 2021-08-04 RX ORDER — PROPOFOL 10 MG/ML
INJECTION, EMULSION INTRAVENOUS CONTINUOUS PRN
Status: DISCONTINUED | OUTPATIENT
Start: 2021-08-04 | End: 2021-08-04

## 2021-08-04 RX ORDER — LORAZEPAM 0.5 MG/1
TABLET ORAL
Qty: 90 TABLET | Refills: 0 | Status: SHIPPED | OUTPATIENT
Start: 2021-08-04 | End: 2021-09-07 | Stop reason: SDUPTHER

## 2021-08-04 RX ORDER — LIDOCAINE HYDROCHLORIDE 10 MG/ML
INJECTION, SOLUTION EPIDURAL; INFILTRATION; INTRACAUDAL; PERINEURAL AS NEEDED
Status: DISCONTINUED | OUTPATIENT
Start: 2021-08-04 | End: 2021-08-04

## 2021-08-04 RX ORDER — PROPOFOL 10 MG/ML
INJECTION, EMULSION INTRAVENOUS AS NEEDED
Status: DISCONTINUED | OUTPATIENT
Start: 2021-08-04 | End: 2021-08-04

## 2021-08-04 RX ADMIN — SODIUM CHLORIDE: 9 INJECTION, SOLUTION INTRAVENOUS at 07:20

## 2021-08-04 RX ADMIN — PROPOFOL 100 MCG/KG/MIN: 10 INJECTION, EMULSION INTRAVENOUS at 08:48

## 2021-08-04 RX ADMIN — PROPOFOL 100 MG: 10 INJECTION, EMULSION INTRAVENOUS at 08:48

## 2021-08-04 RX ADMIN — LIDOCAINE HYDROCHLORIDE 100 MG: 10 INJECTION, SOLUTION EPIDURAL; INFILTRATION; INTRACAUDAL; PERINEURAL at 08:48

## 2021-08-04 NOTE — ANESTHESIA POSTPROCEDURE EVALUATION
Post-Op Assessment Note    CV Status:  Stable  Pain Score: 0    Pain management: adequate     Mental Status:  Alert and awake   Hydration Status:  Euvolemic   PONV Controlled:  Controlled   Airway Patency:  Patent      Post Op Vitals Reviewed: Yes      Staff: CRNA         No complications documented      BP   131/59   Temp      Pulse  86   Resp   17   SpO2   95%

## 2021-08-04 NOTE — ANESTHESIA PREPROCEDURE EVALUATION
Procedure:  COLONOSCOPY  EGD    Relevant Problems   CARDIO   (+) Atrial fibrillation, rapid (HCC)   (+) Benign essential hypertension   (+) Chronic diastolic congestive heart failure (HCC)   (+) Hypercholesterolemia   (+) Pulmonary hypertension (HCC)      ENDO   (+) Type 2 diabetes mellitus, without long-term current use of insulin (HCC)      GI/HEPATIC   (+) Esophageal reflux   (+) Lower gastrointestinal bleeding      HEMATOLOGY   (+) Acute blood loss anemia   (+) Iron deficiency anemia due to chronic blood loss   (+) Microcytic anemia      MUSCULOSKELETAL   (+) Chronic low back pain      NEURO/PSYCH   (+) Current moderate episode of major depressive disorder without prior episode (HCC)   (+) Neck pain, chronic   (+) Paresthesia of upper extremity      PULMONARY   (+) Asthma with COPD with exacerbation (HCC)   (+) Asthma-COPD overlap syndrome (HCC)   (+) Chronic respiratory failure with hypoxia (HCC)   (+) Chronic respiratory failure with hypoxia and hypercapnia (HCC)   (+) Obstructive sleep apnea   (+) Severe persistent asthma   (+) Severe persistent asthma with exacerbation      Other   (+) Class 3 severe obesity in adult [facility])        Physical Exam    Airway    Mallampati score: III  TM Distance: >3 FB  Neck ROM: full     Dental   No notable dental hx     Cardiovascular  Rhythm: irregular, Rate: normal,     Pulmonary  Pulmonary exam normal     Other Findings        Anesthesia Plan  ASA Score- 4     Anesthesia Type- IV sedation with anesthesia with ASA Monitors  Additional Monitors:   Airway Plan:     Comment: I discussed risks (reviewed with patient on the anesthesia consent form), benefits and alternatives of monitored sedation including the possibility under sedation to have recall or mild discomfort          Plan Factors-    Chart reviewed  Patient summary reviewed  Induction- intravenous  Postoperative Plan-     Informed Consent- Anesthetic plan and risks discussed with patient    I personally reviewed this patient with the CRNA  Discussed and agreed on the Anesthesia Plan with the CRNA  Jacoby Alva

## 2021-08-04 NOTE — INTERVAL H&P NOTE
H&P reviewed  After examining the patient I find no changes in the patients condition since the H&P had been written      Vitals:    08/04/21 0717   BP: 158/73   Pulse: 100   Resp: 22   Temp: 98 8 °F (37 1 °C)   SpO2: 93%

## 2021-08-09 DIAGNOSIS — I50.32 CHRONIC DIASTOLIC HEART FAILURE (HCC): ICD-10-CM

## 2021-08-09 RX ORDER — TORSEMIDE 20 MG/1
TABLET ORAL
Qty: 120 TABLET | Refills: 0 | Status: SHIPPED | OUTPATIENT
Start: 2021-08-09 | End: 2021-09-10 | Stop reason: SDUPTHER

## 2021-08-16 PROBLEM — D12.6 TUBULAR ADENOMA OF COLON: Status: ACTIVE | Noted: 2021-08-16

## 2021-08-19 DIAGNOSIS — E11.65 TYPE 2 DIABETES MELLITUS WITH HYPERGLYCEMIA, WITHOUT LONG-TERM CURRENT USE OF INSULIN (HCC): ICD-10-CM

## 2021-08-19 RX ORDER — GLIMEPIRIDE 4 MG/1
TABLET ORAL
Qty: 30 TABLET | Refills: 0 | Status: SHIPPED | OUTPATIENT
Start: 2021-08-19 | End: 2021-09-11

## 2021-08-20 ENCOUNTER — OFFICE VISIT (OUTPATIENT)
Dept: FAMILY MEDICINE CLINIC | Facility: HOSPITAL | Age: 61
End: 2021-08-20
Payer: COMMERCIAL

## 2021-08-20 VITALS
BODY MASS INDEX: 50.71 KG/M2 | OXYGEN SATURATION: 91 % | WEIGHT: 286.2 LBS | SYSTOLIC BLOOD PRESSURE: 128 MMHG | TEMPERATURE: 98.5 F | HEART RATE: 87 BPM | DIASTOLIC BLOOD PRESSURE: 78 MMHG | HEIGHT: 63 IN

## 2021-08-20 DIAGNOSIS — J96.11 CHRONIC RESPIRATORY FAILURE WITH HYPOXIA AND HYPERCAPNIA (HCC): ICD-10-CM

## 2021-08-20 DIAGNOSIS — J45.50 SEVERE PERSISTENT ASTHMA, UNSPECIFIED WHETHER COMPLICATED: ICD-10-CM

## 2021-08-20 DIAGNOSIS — I10 BENIGN ESSENTIAL HYPERTENSION: ICD-10-CM

## 2021-08-20 DIAGNOSIS — J96.12 CHRONIC RESPIRATORY FAILURE WITH HYPOXIA AND HYPERCAPNIA (HCC): ICD-10-CM

## 2021-08-20 DIAGNOSIS — E78.00 HYPERCHOLESTEROLEMIA: ICD-10-CM

## 2021-08-20 DIAGNOSIS — E11.22 TYPE 2 DIABETES MELLITUS WITH STAGE 2 CHRONIC KIDNEY DISEASE, WITHOUT LONG-TERM CURRENT USE OF INSULIN (HCC): ICD-10-CM

## 2021-08-20 DIAGNOSIS — J44.9 ASTHMA-COPD OVERLAP SYNDROME (HCC): ICD-10-CM

## 2021-08-20 DIAGNOSIS — F32.1 CURRENT MODERATE EPISODE OF MAJOR DEPRESSIVE DISORDER WITHOUT PRIOR EPISODE (HCC): Primary | ICD-10-CM

## 2021-08-20 DIAGNOSIS — N18.2 TYPE 2 DIABETES MELLITUS WITH STAGE 2 CHRONIC KIDNEY DISEASE, WITHOUT LONG-TERM CURRENT USE OF INSULIN (HCC): ICD-10-CM

## 2021-08-20 DIAGNOSIS — I48.20 CHRONIC ATRIAL FIBRILLATION (HCC): ICD-10-CM

## 2021-08-20 PROBLEM — F17.210 CIGARETTE NICOTINE DEPENDENCE WITHOUT COMPLICATION: Status: RESOLVED | Noted: 2019-11-20 | Resolved: 2021-08-20

## 2021-08-20 PROCEDURE — 99214 OFFICE O/P EST MOD 30 MIN: CPT | Performed by: FAMILY MEDICINE

## 2021-08-20 PROCEDURE — 1036F TOBACCO NON-USER: CPT | Performed by: FAMILY MEDICINE

## 2021-08-20 PROCEDURE — 3066F NEPHROPATHY DOC TX: CPT | Performed by: FAMILY MEDICINE

## 2021-08-20 RX ORDER — ESCITALOPRAM OXALATE 10 MG/1
10 TABLET ORAL DAILY
Qty: 30 TABLET | Refills: 1 | Status: SHIPPED | OUTPATIENT
Start: 2021-08-20 | End: 2021-09-24 | Stop reason: SDUPTHER

## 2021-08-20 RX ORDER — FLASH GLUCOSE SENSOR
KIT MISCELLANEOUS
Qty: 2 EACH | Refills: 0 | Status: SHIPPED | OUTPATIENT
Start: 2021-08-20 | End: 2021-09-03

## 2021-08-20 RX ORDER — FLASH GLUCOSE SCANNING READER
EACH MISCELLANEOUS
Qty: 1 EACH | Refills: 0 | Status: SHIPPED | OUTPATIENT
Start: 2021-08-20 | End: 2021-09-03

## 2021-08-20 NOTE — PROGRESS NOTES
Patient's shoes and socks removed  Right Foot/Ankle   Right Foot Inspection  Skin Exam: skin normal and skin intact no dry skin, no warmth, no callus, no erythema, no maceration, no abnormal color, no pre-ulcer, no ulcer and no callus                          Toe Exam: ROM and strength within normal limits  Sensory   Vibration: intact  Proprioception: intact   Monofilament testing: intact  Vascular  Capillary refills: < 3 seconds  The right DP pulse is 2+  The right PT pulse is 2+  Left Foot/Ankle  Left Foot Inspection  Skin Exam: skin normal and skin intactno dry skin, no warmth, no erythema, no maceration, normal color, no pre-ulcer, no ulcer and no callus                         Toe Exam: ROM and strength within normal limits                   Sensory   Vibration: intact  Proprioception: intact  Monofilament: intact  Vascular  Capillary refills: < 3 seconds  The left DP pulse is 2+  The left PT pulse is 2+  Assign Risk Category:  Deformity present; No loss of protective sensation; No weak pulses       Risk: 0       Assessment/Plan:      Problem List Items Addressed This Visit        Endocrine    Type 2 diabetes mellitus, without long-term current use of insulin ([facility])    Relevant Medications    Continuous Blood Gluc Sensor (FreeStyle Zion 14 Day Sensor) MISC    Continuous Blood Gluc  (FreeStyle Zion 14 Day [place]) AILYN    Other Relevant Orders    Ambulatory referral to Endocrinology       Respiratory    Asthma-COPD overlap syndrome (HCC)    Chronic respiratory failure with hypoxia and hypercapnia (HCC)    Severe persistent asthma       Cardiovascular and Mediastinum    Benign essential hypertension    Chronic atrial fibrillation (HCC)       Other    Current moderate episode of major depressive disorder without prior episode ([facility] [address] ) - Primary    Relevant Medications    escitalopram (LEXAPRO) 10 mg tablet    Hypercholesterolemia           Plan/Discussion:    1  Diabetes mellitus    Continues to be uncontrolled  Continue working on dietary control which has been an issue  Continue the same regimen for now  Advised to check blood sugars more regularly so that we can adjust her medications  At this time she is agreeable to seeing endocrinology  2  Major depression  Worsening symptoms  Has been on citalopram   Will discontinue this  Trial of Lexapro 10 mg daily  No suicidal ideation or homicidal ideation  3  Chronic respiratory failure secondary to asthma / COPD  Continue follow-up with pulmonology  Currently stable  No acute exacerbation  4  Atrial fibrillation  Currently stable  Continue with the same  Continue with anticoagulation  Continue with follow-up with Cardiology  Follow-up in 2 weeks  Subjective:   Chief Complaint   Patient presents with    Follow-up        Patient ID: Reilly Bell is a 56 y o  female  Patient is seen for follow-up of chronic conditions  Her main issue is feeling significantly more depressed than her usual   Asking about changing or increasing cytology from  She is currently on 20 mg daily  She has no suicidal ideation or homicidal ideation  Just feeling down and depressed more she has lack of energy  She is not wanting to do things  Chronic respiratory failure due to COPD/ asthma  Overall doing okay  Shortness of breath is at baseline  No significant exacerbation  GI  Continue follow-up with Gastroenterology regarding previous recent diagnosis of acute diverticulitis  Her abdomen is feeling okay right now  No abdominal pain  No nausea vomiting diarrhea  The following portions of the patient's history were reviewed and updated as appropriate: allergies, current medications, past family history, past medical history, past social history, past surgical history and problem list     Review of Systems   Constitutional: Negative  Negative for activity change, appetite change, chills, diaphoresis, fatigue and fever  HENT: Negative for congestion, facial swelling and sore throat  Respiratory: Negative  Negative for apnea, cough, chest tightness and shortness of breath  Cardiovascular: Negative  Negative for chest pain and palpitations  Gastrointestinal: Negative  Negative for abdominal distention, abdominal pain, blood in stool, constipation, diarrhea and nausea  Genitourinary: Negative  Negative for difficulty urinating, dysuria, flank pain and frequency  Psychiatric/Behavioral: Positive for behavioral problems  Objective:  Vitals:    08/20/21 1230   BP: 128/78   Pulse: 87   Temp: 98 5 °F (36 9 °C)   SpO2: 91%   Weight: 130 kg (286 lb 3 2 oz)   Height: 5' 3" (1 6 m)     BP Readings from Last 6 Encounters:   08/20/21 128/78   08/04/21 131/59   07/07/21 124/68   07/02/21 132/82   06/18/21 118/78   06/07/21 117/60      Wt Readings from Last 6 Encounters:   08/20/21 130 kg (286 lb 3 2 oz)   07/07/21 129 kg (284 lb)   07/02/21 127 kg (281 lb)   06/18/21 129 kg (283 lb 6 4 oz)   06/07/21 (!) 137 kg (301 lb 9 4 oz)   05/13/21 127 kg (281 lb)             Physical Exam  Vitals and nursing note reviewed  Constitutional:       Appearance: Normal appearance  She is well-developed  She is obese  She is ill-appearing  HENT:      Head: Normocephalic and atraumatic  Right Ear: External ear normal       Left Ear: External ear normal       Nose: Nose normal    Eyes:      Conjunctiva/sclera: Conjunctivae normal       Pupils: Pupils are equal, round, and reactive to light  Neck:      Thyroid: No thyromegaly  Trachea: No tracheal deviation  Cardiovascular:      Rate and Rhythm: Normal rate and regular rhythm  Pulses: no weak pulses          Dorsalis pedis pulses are 2+ on the right side and 2+ on the left side  Posterior tibial pulses are 2+ on the right side and 2+ on the left side  Heart sounds: Normal heart sounds  No murmur heard       Pulmonary:      Effort: Pulmonary effort is normal  No respiratory distress  Breath sounds: Normal breath sounds  No wheezing  Abdominal:      General: Bowel sounds are normal       Palpations: Abdomen is soft  Musculoskeletal:         General: Normal range of motion  Cervical back: Normal range of motion and neck supple  Feet:      Right foot:      Skin integrity: No ulcer, skin breakdown, erythema, warmth, callus or dry skin  Left foot:      Skin integrity: No ulcer, skin breakdown, erythema, warmth, callus or dry skin  Skin:     General: Skin is warm and dry  Capillary Refill: Capillary refill takes less than 2 seconds  Neurological:      General: No focal deficit present  Mental Status: She is alert and oriented to person, place, and time     Psychiatric:         Mood and Affect: Mood normal

## 2021-08-23 PROBLEM — I48.20 CHRONIC ATRIAL FIBRILLATION (HCC): Status: ACTIVE | Noted: 2018-12-12

## 2021-08-24 ENCOUNTER — TELEPHONE (OUTPATIENT)
Dept: FAMILY MEDICINE CLINIC | Facility: HOSPITAL | Age: 61
End: 2021-08-24

## 2021-08-27 ENCOUNTER — RA CDI HCC (OUTPATIENT)
Dept: OTHER | Facility: HOSPITAL | Age: 61
End: 2021-08-27

## 2021-08-27 NOTE — PROGRESS NOTES
[facility] [address]  coding opportunities             Chart Reviewed * (Number of) Inbasket suggestions sent to Provider: 4     Problem listed updated  Provider Accepted, (number of) suggestions accepted: 4         Number of suggestions used: 0      Number of suggestions NOT actually used: 4     Patients insurance company: [employer] (Medicare Advantage and [employer])     Visit status: Patient arrived for their scheduled appointment        [facility] [address]  coding opportunities             Chart Reviewed * (Number of) Inbasket suggestions sent to Provider: 4     Problem listed updated  Provider Accepted, (number of) suggestions accepted: 4               Patients insurance company: [employer] (Medicare Advantage and [employer])           [facility] [address]  coding opportunities             Chart Reviewed * (Number of) Inbasket suggestions sent to Provider: 4                  Patients insurance company: [employer] (Medicare Advantage and [employer] [place])           1) Refer to medication list -    Z79 4 Long term (current) use of insulin ([facility] [address] )    2) Not found on active problem list-   Refer to Office visit dated 8/20/21  Type 2 diabetes mellitus with stage 2 chronic kidney disease, without long-term current use of insulin ([facility] [address] )    3) I13 0: Hypertensive heart and chronic kidney disease with heart failure and stage 1 through stage 4            Chronic kidney disease, or unspecified chronic kidney disease ([facility] [address] )   ----Per ICD 10 CM coding guidelines 2020-21: The classification presumes a causal relationship         between hypertension and heart involvement and kidney involvement, as the two conditions are linked by the term "with" in the Alphabetic Index  These conditions should be coded as related even in the absence of provider documentation explicitly linking them, unless the documentation clearly states the conditions are unrelated      4) Not found on active problem list-   Refer to Office visit dated 6/18/21  E11 65 Type 2 diabetes mellitus with hyperglycemia, without long-term current use of insulin ([facility] [address] )

## 2021-09-02 PROBLEM — E11.65 TYPE 2 DIABETES MELLITUS WITH HYPERGLYCEMIA (HCC): Status: ACTIVE | Noted: 2021-09-02

## 2021-09-03 ENCOUNTER — OFFICE VISIT (OUTPATIENT)
Dept: FAMILY MEDICINE CLINIC | Facility: HOSPITAL | Age: 61
End: 2021-09-03
Payer: COMMERCIAL

## 2021-09-03 VITALS
HEART RATE: 97 BPM | BODY MASS INDEX: 50.89 KG/M2 | DIASTOLIC BLOOD PRESSURE: 78 MMHG | HEIGHT: 63 IN | WEIGHT: 287.2 LBS | SYSTOLIC BLOOD PRESSURE: 122 MMHG | TEMPERATURE: 97.7 F | OXYGEN SATURATION: 89 %

## 2021-09-03 DIAGNOSIS — F32.1 CURRENT MODERATE EPISODE OF MAJOR DEPRESSIVE DISORDER WITHOUT PRIOR EPISODE (HCC): ICD-10-CM

## 2021-09-03 DIAGNOSIS — J44.9 ASTHMA-COPD OVERLAP SYNDROME (HCC): ICD-10-CM

## 2021-09-03 DIAGNOSIS — E66.01 CLASS 3 SEVERE OBESITY WITHOUT SERIOUS COMORBIDITY WITH BODY MASS INDEX (BMI) OF 50.0 TO 59.9 IN ADULT, UNSPECIFIED OBESITY TYPE (HCC): ICD-10-CM

## 2021-09-03 DIAGNOSIS — J96.11 CHRONIC RESPIRATORY FAILURE WITH HYPOXIA AND HYPERCAPNIA (HCC): ICD-10-CM

## 2021-09-03 DIAGNOSIS — Z23 ENCOUNTER FOR IMMUNIZATION: Primary | ICD-10-CM

## 2021-09-03 DIAGNOSIS — I50.32 CHRONIC DIASTOLIC CONGESTIVE HEART FAILURE (HCC): ICD-10-CM

## 2021-09-03 DIAGNOSIS — I48.20 CHRONIC ATRIAL FIBRILLATION (HCC): ICD-10-CM

## 2021-09-03 DIAGNOSIS — J96.11 CHRONIC RESPIRATORY FAILURE WITH HYPOXIA (HCC): ICD-10-CM

## 2021-09-03 DIAGNOSIS — J96.12 CHRONIC RESPIRATORY FAILURE WITH HYPOXIA AND HYPERCAPNIA (HCC): ICD-10-CM

## 2021-09-03 PROCEDURE — 90471 IMMUNIZATION ADMIN: CPT

## 2021-09-03 PROCEDURE — 3008F BODY MASS INDEX DOCD: CPT | Performed by: FAMILY MEDICINE

## 2021-09-03 PROCEDURE — 99214 OFFICE O/P EST MOD 30 MIN: CPT | Performed by: FAMILY MEDICINE

## 2021-09-03 PROCEDURE — 90682 RIV4 VACC RECOMBINANT DNA IM: CPT

## 2021-09-03 NOTE — PROGRESS NOTES
Assessment/Plan:      Problem List Items Addressed This Visit        Respiratory    Asthma-COPD overlap syndrome (HCC)    Chronic respiratory failure with hypoxia (HCC)    Chronic respiratory failure with hypoxia and hypercapnia (HCC)       Cardiovascular and Mediastinum    Chronic atrial fibrillation (HCC)    Chronic diastolic congestive heart failure (HCC)       Other    Class 3 severe obesity in adult [facility])    Current moderate episode of major depressive disorder without prior episode ([facility] [address] )      Other Visit Diagnoses     Encounter for immunization    -  Primary    Relevant Orders    influenza vaccine, quadrivalent, recombinant, PF, 0 5 mL, for patients 18 yr+ (FLUBLOK) (Completed)           Plan/Discussion:    Diabetes mellitus  Uncontrolled  Was not able to get freestyle Zion due to insurance coverage  She will go back to the more traditional blood sugar checks  Once we have better sugar records will need to adjust her insulin regimen  At this time I do think she would benefit from seeing endocrinology  Congestive heart failure  No signs of fluid overload today  Continue with the same continue to monitor  Chronic atrial fibrillation  Currently stable  On anticoagulation  On rate control  Continues on rhythm control with Tambocor as well  Chronic hypoxia secondary to COPD  Continue follow-up with pulmonology  No changes made to the regimen  Major depression  Has not started Lexapro as this was not available due to prior authorizations that were required  She will be starting this  She will be stopping her Celexa  Will continue to monitor  There is no suicidal ideation or homicidal ideation  She will follow-up next  She will follow-up in 4 weeks  Subjective:   Chief Complaint   Patient presents with    Follow-up     2 week         Patient ID: Jenniffer Recinos is a 56 y o  female  Patient is here for 2 week follow-up of her chronic conditions  Chronic hypoxia secondary to COPD  No worsening  Things have been stable  No exacerbation of her COPD  She does have ongoing follow-up with pulmonology  She needs a follow-up soon  Congestive heart failure/atrial fibrillation  No chest pain no change in shortness of breath  Continuing the same  Has regular follow-up with Cardiology  Diabetes mellitus  Was not able to get alfredo Tompkins  She will be checking her sugars with her regular monitor  Has not been checking  Continues to work on her diet  Has been taking her medications regularly  Major depression since the last visit only was able to get the Lexapro few days ago  She will be starting this  No new complaints today  The following portions of the patient's history were reviewed and updated as appropriate: allergies, current medications, past family history, past medical history, past social history, past surgical history and problem list     Review of Systems   Constitutional: Negative  Negative for activity change, appetite change, chills, diaphoresis, fatigue and fever  HENT: Negative for congestion, facial swelling and sore throat  Respiratory: Positive for shortness of breath  Negative for apnea, cough and chest tightness  Cardiovascular: Negative  Negative for chest pain and palpitations  Gastrointestinal: Negative  Negative for abdominal distention, abdominal pain, blood in stool, constipation, diarrhea and nausea  Genitourinary: Negative  Negative for difficulty urinating, dysuria, flank pain and frequency  Psychiatric/Behavioral: Positive for sleep disturbance           Objective:  Vitals:    09/03/21 1243   BP: 122/78   Pulse: 97   Temp: 97 7 °F (36 5 °C)   SpO2: (!) 89%   Weight: 130 kg (287 lb 3 2 oz)   Height: 5' 3" (1 6 m)     BP Readings from Last 6 Encounters:   09/03/21 122/78   08/20/21 128/78   08/04/21 131/59   07/07/21 124/68   07/02/21 132/82   06/18/21 118/78      Wt Readings from Last 6 Encounters:   09/03/21 130 kg (287 lb 3 2 oz)   08/20/21 130 kg (286 lb 3 2 oz)   07/07/21 129 kg (284 lb)   07/02/21 127 kg (281 lb)   06/18/21 129 kg (283 lb 6 4 oz)   06/07/21 (!) 137 kg (301 lb 9 4 oz)             Physical Exam  Vitals and nursing note reviewed  Constitutional:       Appearance: She is well-developed  She is obese  She is not ill-appearing  HENT:      Head: Normocephalic and atraumatic  Right Ear: External ear normal       Left Ear: External ear normal       Nose: Nose normal    Eyes:      Conjunctiva/sclera: Conjunctivae normal       Pupils: Pupils are equal, round, and reactive to light  Neck:      Thyroid: No thyromegaly  Trachea: No tracheal deviation  Cardiovascular:      Rate and Rhythm: Normal rate and regular rhythm  Heart sounds: Normal heart sounds  No murmur heard  Pulmonary:      Effort: Pulmonary effort is normal  No respiratory distress  Breath sounds: Normal breath sounds  No wheezing  Abdominal:      General: Bowel sounds are normal       Palpations: Abdomen is soft  Musculoskeletal:         General: Normal range of motion  Cervical back: Normal range of motion and neck supple  Skin:     General: Skin is warm and dry  Capillary Refill: Capillary refill takes less than 2 seconds  Neurological:      General: No focal deficit present  Mental Status: She is alert and oriented to person, place, and time     Psychiatric:         Mood and Affect: Mood normal          Behavior: Behavior normal

## 2021-09-03 NOTE — LETTER
To whom it may concern:    Travis Zarco is under my professional care  At this point due to multiple medical conditions she is unable to continue working  She is medically disabled and is currently unemployable  She has multiple conditions to include but not limited to:    1  Chronic respiratory failure, Oxygen dependent  2  COPD-Asthma overalp syndrome  3  Obstructive sleep apnea  4  Diabetes Mellitus  5  Congestive heart failure  6  Atrial fibrillation  7  Poor endurance  8   Impaired mobility    Respectfully,         Lynda Hicks MD  Family Medicine

## 2021-09-07 DIAGNOSIS — E11.65 TYPE 2 DIABETES MELLITUS WITH HYPERGLYCEMIA, UNSPECIFIED WHETHER LONG TERM INSULIN USE (HCC): ICD-10-CM

## 2021-09-07 DIAGNOSIS — Z79.899 ENCOUNTER FOR LONG-TERM (CURRENT) DRUG USE: ICD-10-CM

## 2021-09-09 ENCOUNTER — CONSULT (OUTPATIENT)
Dept: CARDIOLOGY CLINIC | Facility: CLINIC | Age: 61
End: 2021-09-09
Payer: COMMERCIAL

## 2021-09-09 VITALS
HEIGHT: 63 IN | OXYGEN SATURATION: 95 % | DIASTOLIC BLOOD PRESSURE: 68 MMHG | HEART RATE: 92 BPM | SYSTOLIC BLOOD PRESSURE: 122 MMHG | WEIGHT: 284.4 LBS | BODY MASS INDEX: 50.39 KG/M2

## 2021-09-09 DIAGNOSIS — I27.20 PULMONARY HYPERTENSION (HCC): ICD-10-CM

## 2021-09-09 DIAGNOSIS — I48.0 PAROXYSMAL ATRIAL FIBRILLATION (HCC): Primary | ICD-10-CM

## 2021-09-09 DIAGNOSIS — I50.32 CHRONIC DIASTOLIC CONGESTIVE HEART FAILURE (HCC): ICD-10-CM

## 2021-09-09 DIAGNOSIS — J96.11 CHRONIC RESPIRATORY FAILURE WITH HYPOXIA (HCC): ICD-10-CM

## 2021-09-09 PROCEDURE — 93000 ELECTROCARDIOGRAM COMPLETE: CPT | Performed by: INTERNAL MEDICINE

## 2021-09-09 PROCEDURE — 99215 OFFICE O/P EST HI 40 MIN: CPT | Performed by: INTERNAL MEDICINE

## 2021-09-09 RX ORDER — LANCETS
EACH MISCELLANEOUS 3 TIMES DAILY
Qty: 100 EACH | Refills: 5 | Status: SHIPPED | OUTPATIENT
Start: 2021-09-09 | End: 2022-05-04

## 2021-09-09 RX ORDER — LORAZEPAM 0.5 MG/1
TABLET ORAL
Qty: 90 TABLET | Refills: 0 | Status: SHIPPED | OUTPATIENT
Start: 2021-09-09 | End: 2021-10-06 | Stop reason: SDUPTHER

## 2021-09-09 NOTE — PROGRESS NOTES
[address] Cardiology  Office Consultation  Shannon Ortiz 56 y o  female MRN: [id number]        Chief Complaint    Chief Complaint   Patient presents with    Follow-up       Referring Provider: Leeroy Nichols DO    Impression & Plan:    1  Chronic diastolic congestive heart failure ([facility] )   She appears euvolemic today  We will continue current medications  Continue torsemide as is with flex dosing   - Ambulatory referral to Cardiology  - POCT ECG    2  Paroxysmal atrial fibrillation (HCC)   She is in sinus rhythm today and does not have many symptomatic recurrences of atrial fibrillation  Continue flecainide and metoprolol  She has no bleeding complications since her June hospitalization for GI bleed and has a [id number] score of at least 3  Continue apixaban 5 mg b i d  3  Pulmonary hypertension ([facility] )   She likely has a mixed group 2 and group 3 pulmonary hypertension, due to left-sided heart disease (diastolic dysfunction) and chronic hypoxic lung disease, respectively  She appears euvolemic today  It has been suggested she begins to follow with the pulmonary hypertension specialist per her pulmonologist   I think first we should get an assessment of her pulmonary hypertension and pulmonary vascular resistance as well as her filling pressures  We will perform right heart catheterization  Vaso reactivity testing could be performed if appropriate (no evidence of increased left-sided filling pressures during procedure)  Based on the right heart catheterization, we can determine if she needs referral to advanced pulmonary hypertension specialist   - CARDIAC [address]; Future    4  Chronic respiratory failure with hypoxia (HCC)   Continue to follow with pulmonology  - CARDIAC [facility]; Future        We will see Mattie Joy back in 6 months for routine follow-up  HPI: Shannon Ortiz is a 56y o  year old female    She has HFpEF and is on torsemide 40mg BID   She has not been very good about checking her weights  For the bit that she does do it, she says she fluctuates a bit up and down but overall has trended even  She can tell when she is hypervolemic because her feet swell and it gets harder to breath  She takes an extra torsemide tablet somewhere in between her morning and evening doses to help reduce her swelling and symptoms  She currently feels that her breathing is at her baseline and she does not have excessive LE edema  She has been sleeping on 2 pillows  She has had atrial fibrillation for 6-8 years  She was last cardioverted in December 2018  She has been on flecainide 100mg BID for years which seems effective overall in maintaining sinus rhythm  She has in the past felt symptomatic when in atrial fibrillation  She has not recent felt any palpitations or irregularity heart in her heart beat, which she does sometimes feel with atrial fibrillation  She also takes metoprolol XL 50 mg daily and has no issue  For anticoagulation, she is on apixaban 5mg BID  In June 2021 she had GIB due to colitis and diverticulosis  She resumed it 5 days after bleeding stopped and has not since had bleeding or melena  She has chronic hypoxic respiratory failure and has been on a stable dose of home oxygen  She is being followed by pulmonology who has diagnosed her with COPD asthma overlap syndrome  She also has WILMA  She was last seen by Dr Riley Urias of pulmonology on 04/26/2021  She did mention to me today that her pulmonologist had recommended potentially following up with a pulmonary hypertension specialist   Her last echocardiogram on 11/13/2020 had an estimated peak RV systolic pressure of 55 millimeters mercury, however this was in the setting of evidence of increased left atrial filling pressure (grade 2 diastolic dysfunction with pseudo normal transmitral filling pattern  )  Of note, her RV size and systolic function were normal that time          Review of Systems      Past Medical History:   Diagnosis Date  Alcohol abuse 2020    Alcohol abuse 2020    Anemia     Atrial fibrillation (HCC)     Cervical radiculopathy     [facility]congest[facility]     Chronic low back pain     Chronic obstructive asthma ([facility] ) 2018    Cigarette nicotine dependence without complication     Quit 12/10/2019       Community acquired pneumonia     Community acquired pneumonia 2020    Depression with anxiety 3/9/2014    Diabetes mellitus ([facility] )     Diabetes mellitus with hyperglycemia (HCC)     Elevated liver enzymes     GERD (gastroesophageal reflux disease)     Hypersomnolence 10/28/2020    Hypokalemia 2018    Paresthesia of upper extremity     Plantar fasciitis     Restless leg     Restless legs syndrome 2014    Sexual dysfunction     Sleep apnea, obstructive     Tenosynovitis of finger     Tinea corporis     Tobacco use 2018    Currently smoking 3-4 cigarettes daily    Trigger middle finger of left hand 2017    Trigger ring finger of left hand 2017    Weakness of upper extremity      Past Surgical History:   Procedure Laterality Date    ABDOMINAL SURGERY      CARPAL TUNNEL RELEASE Bilateral      SECTION      CHOLECYSTECTOMY      laparoscopic    ESOPHAGOGASTRODUODENOSCOPY N/A 12/3/2018    Procedure: ESOPHAGOGASTRODUODENOSCOPY (EGD) AND COLONOSCOPY;  Surgeon: Royce Dunlap MD;  Location: [facility];  Service: Gastroenterology    GASTRIC BYPASS      for morbid obesity with gilda en y    HERNIA REPAIR      HYSTERECTOMY      MT EXCIS PRIMARY GANGLION WRIST Left 2017    Procedure: LONG FINGER GANGLION CYST EXCISION;  Surgeon: Myrna Farmer MD;  [facility];  Service: Orthopedics    MT INCISE FINGER TENDON SHEATH Left 2017    Procedure: THUMB, LONG, RING, TRIGGER FINGER RELEASE;  Surgeon: Myrna Farmer MD;  [facility];  Service: Orthopedics    SHOULDER SURGERY       Social History     Substance and Sexual Activity   Alcohol Use Yes    Alcohol/week: 20 0 standard drinks    Types: 20 Cans of beer per week    Comment: about 6 coors light every night, stopped 3 weeks ago      Social History     Substance and Sexual Activity   Drug Use No     Social History     Tobacco Use   Smoking Status Former Smoker    Packs/day: 0 25    Years: 29 00    Pack years: 7 25    Types: Cigarettes    Start date: 200    Quit date: 12/10/2019    Years since quittin 7   Smokeless Tobacco Never Used     Family History   Problem Relation Age of Onset    Alzheimer's disease Mother     Atrial fibrillation Mother     Dementia Mother     Heart disease Mother         heart problem    Seizures Mother    Macel Sriram Parkinsonism Father     Arthritis Father     Atrial fibrillation Father     Colon cancer Maternal Grandmother [de-identified]    Substance Abuse Neg Hx         mother,father    Mental illness Neg Hx         mother,father    Colon polyps Neg Hx        Allergies: Allergies   Allergen Reactions    Iron Dextran Swelling    Januvia [Sitagliptin] Shortness Of Breath    Jardiance [Empagliflozin] Shortness Of Breath    Clonazepam Other (See Comments)     Unknown reaction    Codeine Itching and Other (See Comments)     itch;mary kay morphine,takes ultram @home    Adhesive [Medical Tape] Itching     itching    Effexor [Venlafaxine] Itching    Lactose - Food Allergy GI Intolerance    Oxycodone-Acetaminophen Anxiety    Oxycodone-Acetaminophen Itching and Rash     Other reaction(s): Other (See Comments)  (PERCOCET) MILD RASH, not allergic to Acetaminophen        Medications (as of START of this encounter):    Outpatient Medications Prior to Visit   Medication Sig Dispense Refill    albuterol (PROVENTIL HFA,VENTOLIN HFA) 90 mcg/act inhaler Inhale 2 puffs every 4 (four) hours as needed for wheezing 1 Inhaler 4    apixaban (Eliquis) 5 mg Take 1 tablet (5 mg total) by mouth 2 (two) times a day 180 tablet 1    ascorbic acid (VITAMIN C) 1000 MG tablet Take 1,000 mg by mouth daily        atorvastatin (LIPITOR) 40 mg tablet TAKE 1 TABLET BY MOUTH EVERY DAY 30 tablet 5    Blood Glucose Monitoring Suppl (Wizdee CONTOUR NEXT MONITOR) w/Device KIT by Does not apply route daily      Cholecalciferol 1000 units tablet Take 1,000 Units by mouth daily        cyanocobalamin (VITAMIN B-12) 100 mcg tablet Take by mouth daily      escitalopram (LEXAPRO) 10 mg tablet Take 1 tablet (10 mg total) by mouth daily 30 tablet 1    flecainide (TAMBOCOR) 100 mg tablet Take 1 tablet (100 mg total) by mouth every 12 (twelve) hours 180 tablet 1    fluticasone (FLONASE) 50 mcg/act nasal spray 1 spray into each nostril 2 (two) times a day 1 Bottle 3    fluticasone (FLOVENT HFA) 220 mcg/act inhaler Inhale 2 puffs 2 (two) times a day Rinse mouth after use   1 Inhaler 6    glimepiride (AMARYL) 4 mg tablet TAKE 1 TABLET BY MOUTH DAILY WITH BREAKFAST 30 tablet 0    glucose blood (Contour Next Test) test strip Test blood sugar 3 times a day 100 each 3    glycopyrrolate-formoterol (BEVESPI AEROSPHERE) 9-4 8 MCG/ACT inhaler Inhale 2 puffs 2 (two) times a day 10 7 g 5    insulin glargine (Lantus SoloStar) 100 units/mL injection pen Inject 25 Units under the skin daily at bedtime 15 mL 0    Insulin Pen Needle (BD Pen Needle Love 2nd Gen) 32G X 4 MM MISC Use daily at bedtime 90 each 1    levalbuterol (XOPENEX) 1 25 mg/0 5 mL nebulizer solution Take 0 5 mL (1 25 mg total) by nebulization 2 (two) times a day 60 vial 0    LIFESCAN UNISTIK II LANCETS MISC by Does not apply route 3 (three) times a day 100 each 5    loratadine (Claritin) 10 mg tablet Take by mouth      LORazepam (ATIVAN) 0 5 mg tablet TAKE 2 TABLETS BY MOUTH AT BEDTIME AND 1 EVERY 6 HOURS AS NEEDED FOR ANXIETY 90 tablet 0    Magnesium 400 MG CAPS Take by mouth 1 BID      metFORMIN (GLUCOPHAGE-XR) 500 mg 24 hr tablet Take 2 tablets (1,000 mg total) by mouth 2 (two) times a day with meals 120 tablet 5    metoprolol succinate (TOPROL-XL) 50 mg 24 hr tablet Take 1 tablet (50 mg total) by mouth daily 90 tablet 1    montelukast (SINGULAIR) 10 mg tablet Take 1 tablet (10 mg total) by mouth daily at bedtime 90 tablet 3    naloxone (NARCAN) 4 mg/0 1 mL nasal spray Administer 1 spray into a nostril  If breathing does not return to normal or if breathing difficulty resumes after 2-3 minutes, give another dose in the other nostril using a new spray  1 each 1    omeprazole (PriLOSEC) 40 MG capsule Take 1 capsule (40 mg total) by mouth daily 90 capsule 3    polyethylene glycol (COLYTE) 4000 mL solution As directed 4000 mL 0    potassium chloride (K-DUR,KLOR-CON) 20 mEq tablet Take 1 tablet (20 mEq total) by mouth 2 (two) times a day 180 tablet 1    torsemide (DEMADEX) 20 mg tablet TAKE 2 TABLETS BY MOUTH TWICE A  tablet 0    traZODone (DESYREL) 150 mg tablet TAKE 1 TABLET BY MOUTH AT BEDTIME 90 tablet 0    ferrous sulfate (FeroSul) 220 (44 Fe) mg/5 mL solution Take 5 mL (220 mg total) by mouth 2 (two) times a day before meals 473 mL 2    mometasone (ELOCON) 0 1 % cream Apply topically daily for 7 days 45 g 0    nystatin (MYCOSTATIN) cream Apply topically 2 (two) times a day for 10 days 30 g 0     No facility-administered medications prior to visit  Vitals:    09/09/21 1107   BP: 122/68   Pulse: 92   SpO2: 95%     Weight (last 2 days)     Date/Time   Weight    09/09/21 1107   129 (284 4)              General: Alexandra Leal is an obese female female, on nasal cannula oxygen, in no distress, pursed-lip breathing   HEENT: moist mucous membranes, EOMI  Neck:  No JVD, supple, trachea midline   Cardiovascular: unremarkable S1/S2, regular rate and rhythm, no murmurs, rubs or gallops   Pulmonary: mild tachypnea, pursed-lip breathing, prolonged expiratory phase on auscultation, no wheezes, rhonchi or rales  Abdomen: soft and nondistended  Extremities: No lower extremity edema  Warm and well perfused extremities  Neuro: no focal motor deficits, AAOx3 (person, place, time)  Psych: Normal mood and affect, cooperative      Laboratory Studies:    Laboratory studies personally reviewed    Cardiac testing:     EKG reviewed personally:   ECG today shows normal sinus rhythm, normal axis, normal intervals  Normal study  Machine interpretation of cannot rule out anterior infarct is likely due to precordial lead reversal          Time Spent:  Total time (face-to-face and non-face-to-face) spent on today's visit was 50 minutes  This includes preparation for the visits (i e  reviewing test results), performance of a medically appropriate history and examination, and orders for medications, tests or other procedures  This time is exclusive of procedures performed and time spent teaching  This is my first encounter with this patient  She has a very complex medical history with serious comorbidities  This is the justification for the prolonged time of the encounter  Mariangel Pruett MD    This note was completed in part utilizing [employer] direct voice recognition software  Grammatical errors, random word insertion, spelling mistakes, occasional wrong word or "sound-alike" substitutions and incomplete sentences may be an occasional consequence of the system secondary to software limitations, ambient noise and hardware issues  At the time of dictation, efforts were made to edit, clarify and /or correct errors  Please read the chart carefully and recognize, using context, where substitutions have occurred  If you have any questions or concerns about the context, text or information contained within the body of this dictation, please contact myself, the provider, for further clarification

## 2021-09-10 DIAGNOSIS — I10 ESSENTIAL HYPERTENSION: ICD-10-CM

## 2021-09-10 DIAGNOSIS — I50.32 CHRONIC DIASTOLIC HEART FAILURE (HCC): ICD-10-CM

## 2021-09-10 DIAGNOSIS — I50.31 ACUTE DIASTOLIC CONGESTIVE HEART FAILURE (HCC): ICD-10-CM

## 2021-09-10 RX ORDER — TORSEMIDE 20 MG/1
TABLET ORAL
Qty: 120 TABLET | Refills: 0 | Status: ON HOLD | OUTPATIENT
Start: 2021-09-10 | End: 2021-09-30 | Stop reason: SDUPTHER

## 2021-09-10 RX ORDER — METOPROLOL SUCCINATE 50 MG/1
50 TABLET, EXTENDED RELEASE ORAL DAILY
Qty: 90 TABLET | Refills: 1 | Status: SHIPPED | OUTPATIENT
Start: 2021-09-10 | End: 2022-01-31

## 2021-09-10 RX ORDER — POTASSIUM CHLORIDE 20 MEQ/1
20 TABLET, EXTENDED RELEASE ORAL 2 TIMES DAILY
Qty: 180 TABLET | Refills: 1 | Status: SHIPPED | OUTPATIENT
Start: 2021-09-10 | End: 2022-02-05

## 2021-09-11 DIAGNOSIS — E11.65 TYPE 2 DIABETES MELLITUS WITH HYPERGLYCEMIA, WITHOUT LONG-TERM CURRENT USE OF INSULIN (HCC): ICD-10-CM

## 2021-09-11 RX ORDER — GLIMEPIRIDE 4 MG/1
TABLET ORAL
Qty: 30 TABLET | Refills: 0 | Status: SHIPPED | OUTPATIENT
Start: 2021-09-11 | End: 2021-10-07

## 2021-09-12 DIAGNOSIS — N18.2 TYPE 2 DIABETES MELLITUS WITH STAGE 2 CHRONIC KIDNEY DISEASE, WITHOUT LONG-TERM CURRENT USE OF INSULIN (HCC): ICD-10-CM

## 2021-09-12 DIAGNOSIS — E11.22 TYPE 2 DIABETES MELLITUS WITH STAGE 2 CHRONIC KIDNEY DISEASE, WITHOUT LONG-TERM CURRENT USE OF INSULIN (HCC): ICD-10-CM

## 2021-09-12 PROCEDURE — 3066F NEPHROPATHY DOC TX: CPT | Performed by: INTERNAL MEDICINE

## 2021-09-13 RX ORDER — INSULIN GLARGINE 100 [IU]/ML
25 INJECTION, SOLUTION SUBCUTANEOUS
Qty: 15 ML | Refills: 1 | Status: SHIPPED | OUTPATIENT
Start: 2021-09-13 | End: 2022-02-02

## 2021-09-14 DIAGNOSIS — D50.8 OTHER IRON DEFICIENCY ANEMIA: Primary | ICD-10-CM

## 2021-09-21 ENCOUNTER — TELEPHONE (OUTPATIENT)
Dept: CARDIOLOGY CLINIC | Facility: CLINIC | Age: 61
End: 2021-09-21

## 2021-09-21 DIAGNOSIS — I50.32 CHRONIC DIASTOLIC CONGESTIVE HEART FAILURE (HCC): Primary | ICD-10-CM

## 2021-09-21 NOTE — TELEPHONE ENCOUNTER
----- Message from Ju Lacey sent at 9/21/2021  3:32 PM EDT -----  Regarding: Cardiac RHC  Good Afternoon,     Please contact the patient to schedule Cardiac RHC  The request was not sent to you when she checked out on 9/9  She called today wondering why she was not called  I told her your Department is very prompt & will get to her asap to schedule       Thank you & Have a great day,  Tonja Kim

## 2021-09-21 NOTE — TELEPHONE ENCOUNTER
Patient schedule for RHC at [facility] on 9/30/21 with Dr Alivia Norton  Patient aware of general instructions, meds holds (Eliquis, glimepiride and torsemide-- hold Am of; metformin_ hold the Pm and Am prior) and labs required    Please ana can you check for insurance approval

## 2021-09-22 NOTE — TELEPHONE ENCOUNTER
Faxed Form and Clinicals to [facility] for review

## 2021-09-24 ENCOUNTER — OFFICE VISIT (OUTPATIENT)
Dept: PULMONOLOGY | Facility: HOSPITAL | Age: 61
End: 2021-09-24
Payer: COMMERCIAL

## 2021-09-24 ENCOUNTER — TELEPHONE (OUTPATIENT)
Dept: FAMILY MEDICINE CLINIC | Facility: HOSPITAL | Age: 61
End: 2021-09-24

## 2021-09-24 VITALS
SYSTOLIC BLOOD PRESSURE: 110 MMHG | HEIGHT: 63 IN | WEIGHT: 289.8 LBS | HEART RATE: 86 BPM | DIASTOLIC BLOOD PRESSURE: 60 MMHG | OXYGEN SATURATION: 92 % | TEMPERATURE: 98.1 F | BODY MASS INDEX: 51.35 KG/M2

## 2021-09-24 DIAGNOSIS — I48.0 PAROXYSMAL ATRIAL FIBRILLATION (HCC): ICD-10-CM

## 2021-09-24 DIAGNOSIS — J44.1 COPD WITH ACUTE EXACERBATION (HCC): ICD-10-CM

## 2021-09-24 DIAGNOSIS — I27.20 PULMONARY HYPERTENSION (HCC): ICD-10-CM

## 2021-09-24 DIAGNOSIS — J96.11 CHRONIC RESPIRATORY FAILURE WITH HYPOXIA (HCC): ICD-10-CM

## 2021-09-24 DIAGNOSIS — J45.51 SEVERE PERSISTENT ASTHMA WITH EXACERBATION: ICD-10-CM

## 2021-09-24 DIAGNOSIS — I48.91 ATRIAL FIBRILLATION, UNSPECIFIED TYPE (HCC): ICD-10-CM

## 2021-09-24 DIAGNOSIS — F32.1 CURRENT MODERATE EPISODE OF MAJOR DEPRESSIVE DISORDER WITHOUT PRIOR EPISODE (HCC): ICD-10-CM

## 2021-09-24 DIAGNOSIS — G47.33 OBSTRUCTIVE SLEEP APNEA: Primary | ICD-10-CM

## 2021-09-24 PROCEDURE — 99214 OFFICE O/P EST MOD 30 MIN: CPT | Performed by: INTERNAL MEDICINE

## 2021-09-24 PROCEDURE — 3074F SYST BP LT 130 MM HG: CPT | Performed by: INTERNAL MEDICINE

## 2021-09-24 PROCEDURE — 1036F TOBACCO NON-USER: CPT | Performed by: INTERNAL MEDICINE

## 2021-09-24 PROCEDURE — 3008F BODY MASS INDEX DOCD: CPT | Performed by: INTERNAL MEDICINE

## 2021-09-24 PROCEDURE — 3078F DIAST BP <80 MM HG: CPT | Performed by: INTERNAL MEDICINE

## 2021-09-24 RX ORDER — ESCITALOPRAM OXALATE 10 MG/1
10 TABLET ORAL DAILY
Qty: 30 TABLET | Refills: 1 | Status: SHIPPED | OUTPATIENT
Start: 2021-09-24 | End: 2021-12-20

## 2021-09-24 RX ORDER — FLECAINIDE ACETATE 100 MG/1
100 TABLET ORAL EVERY 12 HOURS SCHEDULED
Qty: 180 TABLET | Refills: 1 | Status: SHIPPED | OUTPATIENT
Start: 2021-09-24 | End: 2021-10-21 | Stop reason: SDUPTHER

## 2021-09-24 NOTE — PATIENT INSTRUCTIONS
Asthma   WHAT YOU NEED TO KNOW:   Asthma is a lung disease that makes breathing difficult  Chronic inflammation and reactions to triggers narrow the airways in the lungs  Asthma can become life-threatening if it is not managed  DISCHARGE INSTRUCTIONS:   Call your local emergency number (911 in the [address]) if:   · You have severe shortness of breath  · The skin around your neck and ribs pulls in with each breath  · Your peak flow numbers are in the red zone of your AAP  Return to the emergency department if:   · You have shortness of breath, even after you take your short-term medicine as directed  · Your lips or nails turn blue or gray  Call your doctor or asthma specialist if:   · You run out of medicine before your next refill is due  · Your symptoms get worse  · You need to take more medicine than usual to control your symptoms  · You have questions or concerns about your condition or care  Medicines:   · Medicines  may be used to decrease inflammation, open airways, and make it easier to breathe  Medicines may be inhaled, taken as a pill, or injected  Short-term medicines relieve your symptoms quickly  Long-term medicines are used to prevent future asthma attacks  Other medicines may be needed if your regular medicines are not able to prevent attacks  You may also need medicine to help control your allergies  Ask your healthcare provider for more information about any medicine you are given and how to take it safely  · Take your medicine as directed  Contact your healthcare provider if you think your medicine is not helping or if you have side effects  Tell him of her if you are allergic to any medicine  Keep a list of the medicines, vitamins, and herbs you take  Include the amounts, and when and why you take them  Bring the list or the pill bottles to follow-up visits  Carry your medicine list with you in case of an emergency      Manage your symptoms and prevent future attacks: · Follow your Asthma Action Plan (AAP)  This is a written plan that you and your healthcare provider create  It explains which medicine you need and when to change doses if necessary  It also explains how you can monitor symptoms and use a peak flow meter  The meter measures how well your lungs are working  · Manage other health conditions , such as allergies, acid reflux, and sleep apnea  · Identify and avoid triggers  These may include pets, dust mites, mold, and cockroaches  · Do not smoke or be around others who smoke  Nicotine and other chemicals in cigarettes and cigars can cause lung damage  Ask your healthcare provider for information if you currently smoke and need help to quit  E-cigarettes or smokeless tobacco still contain nicotine  Talk to your healthcare provider before you use these products  · Ask about the flu vaccine  The flu can make your asthma worse  You may need a yearly flu shot  Follow up with your healthcare provider as directed: You will need to return to make sure your medicine is working and your symptoms are controlled  You may be referred to an asthma or allergy specialist  Lenin Lindy may be asked to keep a record of your peak flow values and bring it with you to your appointments  Write down your questions so you remember to ask them during your visits  © Copyright [employer] 2021 Information is for End User's use only and may not be sold, redistributed or otherwise used for commercial purposes  All illustrations and images included in CareNotes® are the copyrighted property of A D A M , Inc  or Mayelin Sloan   The above information is an  only  It is not intended as medical advice for individual conditions or treatments  Talk to your doctor, nurse or pharmacist before following any medical regimen to see if it is safe and effective for you

## 2021-09-24 NOTE — TELEPHONE ENCOUNTER
She does not need auth for her Cath [id number] on 9/30/21 at [facility] at [place] [address] 9/24/21

## 2021-09-24 NOTE — TELEPHONE ENCOUNTER
Would advise pt to hold the Lexapro and to send to Dr Martha Pérez on Monday to see if he wishes to check with Cardio or to try an alternative agent    Con't the Flecainide as directed

## 2021-09-24 NOTE — TELEPHONE ENCOUNTER
Needs refilled today, out of pills today       She also needs citalopram but she has two weeks of pills left for those so its not a rush, just please rush the flecainide

## 2021-09-24 NOTE — ASSESSMENT & PLAN NOTE
Group 2 and 3, she is following up with Cardiology, adherent using torsemide 40 mg b i d , using CPAP every night, scheduled for right heart catheterization this month

## 2021-09-24 NOTE — PROGRESS NOTES
Pulmonary/Sleep Follow Up Note   Joselyn Busby 56 y o  female MRN: [id number]  9/24/2021      Assessment and Plan:    1  Obstructive sleep apnea  Assessment & Plan: Adherent using CPAP every night      2  COPD with acute exacerbation ([facility] [address] )  -     Ambulatory referral to Pulmonology    3  Chronic respiratory failure with hypoxia (HCC)  Assessment & Plan:   Continues to use oxygen 247    Orders:  -     Ambulatory referral to Pulmonology    4  Severe persistent asthma with exacerbation  Assessment & Plan:  ·  PFTs reviewed, her asthma has been on maximum therapy she is already on Bevespi, Flovent, Flonase, Singulair  ·  not in exacerbation currently      5  Paroxysmal atrial fibrillation (HCC)  Assessment & Plan:   Follows up with Cardiology, adherent to medications and using CPAP every night      6  Pulmonary hypertension ([facility] [address] )  Assessment & Plan:   Group 2 and 3, she is following up with Cardiology, adherent using torsemide 40 mg b i d , using CPAP every night, scheduled for right heart catheterization this month        No follow-ups on file  History of Present Illness   HPI:  Joselyn Busby is a 56 y o  female who  Has past medical history as detailed below here  For follow-up on her asthma and COPD unfortunately her symptoms has not been controlled, she is using Bevespi every day 2 puffs in the morning 2 puffs and Flovent inhaler 220, Flonase nasal spray in the evening and she is using albuterol inhaler as needed    she denies significant cough, however her dyspnea is with minimal exertion and she is adherent using her CPAP every night and wearing her oxygen 24/7   adherent using torsemide 40 mg b i d    has been seen by Cardiology and scheduled for right heart catheterization later this month    Review of Systems   All other systems reviewed and are negative        Historical Information   Past Medical History:   Diagnosis Date    Alcohol abuse 5/21/2020    Alcohol abuse 5/21/2020    Anemia     Atrial fibrillation ([facility] )     Cervical radiculopathy     CHF (congestive heart failure) (HCC)     Chronic low back pain     Chronic obstructive asthma ([facility] ) 2018    Cigarette nicotine dependence without complication     Quit 12/10/2019       Community acquired pneumonia     Community acquired pneumonia 2020    Depression with anxiety 3/9/2014    Diabetes mellitus ([facility] )     Diabetes mellitus with hyperglycemia (HCC)     Elevated liver enzymes     GERD (gastroesophageal reflux disease)     Hypersomnolence 10/28/2020    Hypokalemia 2018    Paresthesia of upper extremity     Plantar fasciitis     Restless leg     Restless legs syndrome 2014    Sexual dysfunction     Sleep apnea, obstructive     Tenosynovitis of finger     Tinea corporis     Tobacco use 2018    Currently smoking 3-4 cigarettes daily    Trigger middle finger of left hand 2017    Trigger ring finger of left hand 2017    Weakness of upper extremity      Past Surgical History:   Procedure Laterality Date    ABDOMINAL SURGERY      CARPAL TUNNEL RELEASE Bilateral      SECTION      CHOLECYSTECTOMY      laparoscopic    ESOPHAGOGASTRODUODENOSCOPY N/A 12/3/2018    Procedure: ESOPHAGOGASTRODUODENOSCOPY (EGD) AND COLONOSCOPY;  Surgeon: Brit Hernandez MD;  Location: [facility];  Service: Gastroenterology    GASTRIC BYPASS      for morbid obesity with gilda en y    HERNIA REPAIR      HYSTERECTOMY      AZ EXCIS PRIMARY GANGLION WRIST Left 2017    Procedure: LONG FINGER GANGLION CYST EXCISION;  Surgeon: Luiz Mcleod MD;  [facility];  Service: Orthopedics    AZ INCISE FINGER TENDON SHEATH Left 2017    Procedure: Norma Pock, RING, TRIGGER FINGER RELEASE;  Surgeon: Luiz Mcleod MD;  [facility]: [facility];  Service: Orthopedics    SHOULDER SURGERY       Family History   Problem Relation Age of Onset    Alzheimer's disease Mother     Atrial fibrillation Mother  Dementia Mother     Heart disease Mother         heart problem    Seizures Mother     Parkinsonism Father     Arthritis Father     Atrial fibrillation Father     Colon cancer Maternal Grandmother [de-identified]    Substance Abuse Neg Hx         mother,father    Mental illness Neg Hx         mother,father    Colon polyps Neg Hx          Meds/Allergies     Current Outpatient Medications:     albuterol (PROVENTIL HFA,VENTOLIN HFA) 90 mcg/act inhaler, Inhale 2 puffs every 4 (four) hours as needed for wheezing, Disp: 1 Inhaler, Rfl: 4    apixaban (Eliquis) 5 mg, Take 1 tablet (5 mg total) by mouth 2 (two) times a day, Disp: 180 tablet, Rfl: 1    ascorbic acid (VITAMIN C) 1000 MG tablet, Take 1,000 mg by mouth daily  , Disp: , Rfl:     atorvastatin (LIPITOR) 40 mg tablet, TAKE 1 TABLET BY MOUTH EVERY DAY, Disp: 30 tablet, Rfl: 5    Blood Glucose Monitoring Suppl ([employer] CONTOUR NEXT MONITOR) w/Device KIT, by Does not apply route daily, Disp: , Rfl:     Cholecalciferol 1000 units tablet, Take 1,000 Units by mouth daily  , Disp: , Rfl:     cyanocobalamin (VITAMIN B-12) 100 mcg tablet, Take by mouth daily, Disp: , Rfl:     escitalopram (LEXAPRO) 10 mg tablet, Take 1 tablet (10 mg total) by mouth daily, Disp: 30 tablet, Rfl: 1    flecainide (TAMBOCOR) 100 mg tablet, Take 1 tablet (100 mg total) by mouth every 12 (twelve) hours, Disp: 180 tablet, Rfl: 1    fluticasone (FLONASE) 50 mcg/act nasal spray, 1 spray into each nostril 2 (two) times a day, Disp: 1 Bottle, Rfl: 3    fluticasone (FLOVENT HFA) 220 mcg/act inhaler, Inhale 2 puffs 2 (two) times a day Rinse mouth after use , Disp: 1 Inhaler, Rfl: 6    glimepiride (AMARYL) 4 mg tablet, TAKE 1 TABLET BY MOUTH DAILY WITH BREAKFAST, Disp: 30 tablet, Rfl: 0    glucose blood (Contour Next Test) test strip, Test blood sugar 3 times a day, Disp: 100 each, Rfl: 3    glycopyrrolate-formoterol (BEVESPI AEROSPHERE) 9-4 8 MCG/ACT inhaler, Inhale 2 puffs 2 (two) times a day, Disp: 10 7 g, Rfl: 5    Insulin Pen Needle (BD Pen Needle Love 2nd Gen) 32G X 4 MM MISC, Use daily at bedtime, Disp: 90 each, Rfl: 1    Lantus SoloStar 100 units/mL injection pen, INJECT 25 UNITS UNDER THE SKIN DAILY AT BEDTIME, Disp: 15 mL, Rfl: 1    levalbuterol (XOPENEX) 1 25 mg/0 5 mL nebulizer solution, Take 0 5 mL (1 25 mg total) by nebulization 2 (two) times a day, Disp: 60 vial, Rfl: 0    LifeScan [place] II Lancets MISC, Use 3 (three) times a day, Disp: 100 each, Rfl: 5    loratadine (Claritin) 10 mg tablet, Take by mouth, Disp: , Rfl:     LORazepam (ATIVAN) 0 5 mg tablet, TAKE 2 TABLETS BY MOUTH AT BEDTIME AND 1 EVERY 6 HOURS AS NEEDED FOR ANXIETY, Disp: 90 tablet, Rfl: 0    Magnesium 400 MG CAPS, Take by mouth 1 BID, Disp: , Rfl:     metFORMIN (GLUCOPHAGE-XR) 500 mg 24 hr tablet, Take 2 tablets (1,000 mg total) by mouth 2 (two) times a day with meals, Disp: 120 tablet, Rfl: 5    metoprolol succinate (TOPROL-XL) 50 mg 24 hr tablet, Take 1 tablet (50 mg total) by mouth daily, Disp: 90 tablet, Rfl: 1    montelukast (SINGULAIR) 10 mg tablet, Take 1 tablet (10 mg total) by mouth daily at bedtime, Disp: 90 tablet, Rfl: 3    naloxone (NARCAN) 4 mg/0 1 mL nasal spray, Administer 1 spray into a nostril   If breathing does not return to normal or if breathing difficulty resumes after 2-3 minutes, give another dose in the other nostril using a new spray , Disp: 1 each, Rfl: 1    omeprazole (PriLOSEC) 40 MG capsule, Take 1 capsule (40 mg total) by mouth daily, Disp: 90 capsule, Rfl: 3    polyethylene glycol (COLYTE) 4000 mL solution, As directed, Disp: 4000 mL, Rfl: 0    potassium chloride (K-DUR,KLOR-CON) 20 mEq tablet, Take 1 tablet (20 mEq total) by mouth 2 (two) times a day, Disp: 180 tablet, Rfl: 1    torsemide (DEMADEX) 20 mg tablet, TAKE 2 TABLETS BY MOUTH TWICE A DAY, Disp: 120 tablet, Rfl: 0    traZODone (DESYREL) 150 mg tablet, TAKE 1 TABLET BY MOUTH AT BEDTIME, Disp: 90 tablet, Rfl: 0    ferrous sulfate (FeroSul) 220 (44 Fe) mg/5 mL solution, Take 5 mL (220 mg total) by mouth 2 (two) times a day before meals, Disp: 473 mL, Rfl: 2    mometasone (ELOCON) 0 1 % cream, Apply topically daily for 7 days, Disp: 45 g, Rfl: 0    nystatin (MYCOSTATIN) cream, Apply topically 2 (two) times a day for 10 days, Disp: 30 g, Rfl: 0  Allergies   Allergen Reactions    Iron Dextran Swelling    Januvia [Sitagliptin] Shortness Of Breath    Jardiance [Empagliflozin] Shortness Of Breath    Clonazepam Other (See Comments)     Unknown reaction    Codeine Itching and Other (See Comments)     itch;mary kay morphine,takes ultram @home    Adhesive [Medical Tape] Itching     itching    Effexor [Venlafaxine] Itching    Lactose - Food Allergy GI Intolerance    Oxycodone-Acetaminophen Anxiety    Oxycodone-Acetaminophen Itching and Rash     Other reaction(s): Other (See Comments)  (PERCOCET) MILD RASH, not allergic to Acetaminophen        Vitals: Blood pressure 110/60, pulse 86, temperature 98 1 °F (36 7 °C), height 5' 3" (1 6 m), weight 131 kg (289 lb 12 8 oz), SpO2 92 %, not currently breastfeeding  Body mass index is 51 34 kg/m²  Oxygen Therapy  SpO2: 92 %  Oxygen Therapy: Supplemental oxygen  O2 Delivery Method: Nasal cannula  O2 Flow Rate (L/min): 4 L/min (pulsating)      Physical Exam  Physical Exam  Constitutional:       General: She is not in acute distress  Appearance: Normal appearance  She is obese  She is not ill-appearing  HENT:      Head: Normocephalic and atraumatic  Nose: No congestion or rhinorrhea  Mouth/Throat:      Mouth: Mucous membranes are moist       Pharynx: Oropharynx is clear  No oropharyngeal exudate or posterior oropharyngeal erythema  Eyes:      General: No scleral icterus  Right eye: No discharge  Left eye: No discharge  Extraocular Movements: Extraocular movements intact  Cardiovascular:      Rate and Rhythm: Normal rate and regular rhythm  Pulses: Normal pulses  Heart sounds: Normal heart sounds  No murmur heard  No gallop  Pulmonary:      Effort: Pulmonary effort is normal  No respiratory distress  Breath sounds: Normal breath sounds  No wheezing, rhonchi or rales  Comments: Diminished air entry bilaterally  Abdominal:      General: Abdomen is flat  Bowel sounds are normal  There is no distension  Palpations: Abdomen is soft  Tenderness: There is no abdominal tenderness  Musculoskeletal:         General: No swelling, tenderness or deformity  Cervical back: Normal range of motion and neck supple  No rigidity  Right lower leg: No edema  Left lower leg: No edema  Skin:     General: Skin is warm and dry  Coloration: Skin is not pale  Findings: No erythema  Neurological:      General: No focal deficit present  Mental Status: She is alert and oriented to person, place, and time  Mental status is at baseline  Psychiatric:         Mood and Affect: Mood normal          Behavior: Behavior normal          Thought Content: Thought content normal          Judgment: Judgment normal          Labs: I have personally reviewed pertinent lab results  , ABG: No results found for: PHART, KCX2UXV, PO2ART, NZY2NCX, I5PPVPOI, BEART, SOURCE, BNP: No results found for: BNP, CBC: No results found for: WBC, HGB, HCT, MCV, PLT, ADJUSTEDWBC, MCH, MCHC, RDW, MPV, NRBC, CMP: No results found for: SODIUM, K, CL, CO2, ANIONGAP, BUN, CREATININE, GLUCOSE, CALCIUM, AST, ALT, ALKPHOS, PROT, BILITOT, EGFR, PT/INR: No results found for: PT, INR, Troponin: No results found for: TROPONINI  Lab Results   Component Value Date    WBC 12 4 (H) 07/12/2021    HGB 11 4 07/12/2021    HCT 38 3 07/12/2021    MCV 82 07/12/2021     07/12/2021     Lab Results   Component Value Date    GLUCOSE 131 (H) 09/22/2017    CALCIUM 8 1 (L) 06/07/2021     09/22/2017    K 3 4 (L) 06/07/2021    CO2 36 (H) 06/07/2021     06/07/2021 BUN 6 06/07/2021    CREATININE 0 65 06/07/2021     No results found for: IGE  Lab Results   Component Value Date    ALT 47 06/05/2021    AST 21 06/05/2021    ALKPHOS 147 (H) 06/05/2021    BILITOT 0 4 09/22/2017         Imaging and other studies: I have personally reviewed pertinent films in PACS  CXR 4/2021  Prominent pulmonary artery contour suspicious for pulmonary hypertension  No pneumothorax        Pulmonary function testing:   FEV1/FVC Ratio: 76 %  Forced Vital Capacity: 1 49 L    47 % predicted  FEV1: 0 89 L     36 % predicted  Post bronchodilator response: Bronchodilator was not administered     Lung volumes by body plethysmography:   Total Lung Capacity 107 % predicted   Residual volume 183 % predicted     DLCO corrected for patients hemoglobin level: 72 %       EKG, Pathology, and Other Studies: I have personally reviewed pertinent reports  TTE 11/2020  LEFT VENTRICLE: Size was normal  Systolic function was normal by visual assessment  Ejection fraction was estimated to be 55 %  There were no regional wall motion abnormalities  Wall thickness was normal  DOPPLER: The ratio of early  ventricular filling to atrial contraction velocities was within the normal range  Features were consistent with a pseudonormal left ventricular filling pattern, with concomitant abnormal relaxation and increased filling pressure (grade 2  diastolic dysfunction)      RIGHT VENTRICLE: The size was normal  Systolic function was normal  DOPPLER: Systolic pressure was moderately to markedly increased  Estimated peak pressure was 55 mmHg      LEFT ATRIUM: Size was normal      RIGHT ATRIUM: Size was normal      MITRAL VALVE: Valve structure was normal  There was mild calcification  There was normal leaflet separation  DOPPLER: The transmitral velocity was within the normal range  There was no evidence for stenosis  There was mild regurgitation      AORTIC VALVE: The valve was trileaflet   Leaflets exhibited normal thickness and

## 2021-09-24 NOTE — ASSESSMENT & PLAN NOTE
·  PFTs reviewed, her asthma has been on maximum therapy she is already on Bevespi, Flovent, Flonase, Singulair  ·  not in exacerbation currently

## 2021-09-24 NOTE — PROGRESS NOTES
Review of Systems      Genitourinary need to urinate more than twice a night   Cardiology ankle/leg swelling   Gastrointestinal none   Neurology awaken with headache, numbness/tingling of an extremity and difficulty with memory   Constitutional none   Integumentary none   Psychiatry anxiety and depression   Musculoskeletal muscle aches, back pain and sciatica   Pulmonary shortness of breath with activity   ENT throat clearing   Endocrine none   Hematological none

## 2021-09-24 NOTE — TELEPHONE ENCOUNTER
Spoke to pt, states she has been on both meds for a while now but is switching to a different pharm    Called pharm and they will fill both meds

## 2021-09-25 LAB
ALBUMIN SERPL-MCNC: 4.3 G/DL (ref 3.8–4.8)
ALBUMIN/GLOB SERPL: 2 {RATIO} (ref 1.2–2.2)
ALP SERPL-CCNC: 167 IU/L (ref 44–121)
ALT SERPL-CCNC: 57 IU/L (ref 0–32)
AST SERPL-CCNC: 30 IU/L (ref 0–40)
BASOPHILS # BLD AUTO: 0.1 X10E3/UL (ref 0–0.2)
BASOPHILS # BLD AUTO: 0.1 X10E3/UL (ref 0–0.2)
BASOPHILS NFR BLD AUTO: 0 %
BASOPHILS NFR BLD AUTO: 1 %
BILIRUB SERPL-MCNC: 0.3 MG/DL (ref 0–1.2)
BUN SERPL-MCNC: 12 MG/DL (ref 8–27)
BUN/CREAT SERPL: 20 (ref 12–28)
CALCIUM SERPL-MCNC: 9.8 MG/DL (ref 8.7–10.3)
CHLORIDE SERPL-SCNC: 92 MMOL/L (ref 96–106)
CO2 SERPL-SCNC: 28 MMOL/L (ref 20–29)
CREAT SERPL-MCNC: 0.59 MG/DL (ref 0.57–1)
EOSINOPHIL # BLD AUTO: 0.2 X10E3/UL (ref 0–0.4)
EOSINOPHIL # BLD AUTO: 0.2 X10E3/UL (ref 0–0.4)
EOSINOPHIL NFR BLD AUTO: 2 %
EOSINOPHIL NFR BLD AUTO: 2 %
ERYTHROCYTE [DISTWIDTH] IN BLOOD BY AUTOMATED COUNT: 15.3 % (ref 11.7–15.4)
ERYTHROCYTE [DISTWIDTH] IN BLOOD BY AUTOMATED COUNT: 15.4 % (ref 11.7–15.4)
FERRITIN SERPL-MCNC: 46 NG/ML (ref 15–150)
GLOBULIN SER-MCNC: 2.2 G/DL (ref 1.5–4.5)
GLUCOSE SERPL-MCNC: 362 MG/DL (ref 65–99)
HCT VFR BLD AUTO: 38.3 % (ref 34–46.6)
HCT VFR BLD AUTO: 39.3 % (ref 34–46.6)
HGB BLD-MCNC: 11.7 G/DL (ref 11.1–15.9)
HGB BLD-MCNC: 12.1 G/DL (ref 11.1–15.9)
IMM GRANULOCYTES # BLD: 0.1 X10E3/UL (ref 0–0.1)
IMM GRANULOCYTES # BLD: 0.1 X10E3/UL (ref 0–0.1)
IMM GRANULOCYTES NFR BLD: 1 %
IMM GRANULOCYTES NFR BLD: 1 %
IRON SATN MFR SERPL: 9 % (ref 15–55)
IRON SERPL-MCNC: 38 UG/DL (ref 27–139)
LYMPHOCYTES # BLD AUTO: 1.3 X10E3/UL (ref 0.7–3.1)
LYMPHOCYTES # BLD AUTO: 1.3 X10E3/UL (ref 0.7–3.1)
LYMPHOCYTES NFR BLD AUTO: 10 %
LYMPHOCYTES NFR BLD AUTO: 10 %
MCH RBC QN AUTO: 23.8 PG (ref 26.6–33)
MCH RBC QN AUTO: 24.2 PG (ref 26.6–33)
MCHC RBC AUTO-ENTMCNC: 30.5 G/DL (ref 31.5–35.7)
MCHC RBC AUTO-ENTMCNC: 30.8 G/DL (ref 31.5–35.7)
MCV RBC AUTO: 78 FL (ref 79–97)
MCV RBC AUTO: 79 FL (ref 79–97)
MONOCYTES # BLD AUTO: 0.6 X10E3/UL (ref 0.1–0.9)
MONOCYTES # BLD AUTO: 0.6 X10E3/UL (ref 0.1–0.9)
MONOCYTES NFR BLD AUTO: 5 %
MONOCYTES NFR BLD AUTO: 5 %
NEUTROPHILS # BLD AUTO: 10.6 X10E3/UL (ref 1.4–7)
NEUTROPHILS # BLD AUTO: 10.7 X10E3/UL (ref 1.4–7)
NEUTROPHILS NFR BLD AUTO: 81 %
NEUTROPHILS NFR BLD AUTO: 82 %
PLATELET # BLD AUTO: 277 X10E3/UL (ref 150–450)
PLATELET # BLD AUTO: 290 X10E3/UL (ref 150–450)
POTASSIUM SERPL-SCNC: 5.1 MMOL/L (ref 3.5–5.2)
PROT SERPL-MCNC: 6.5 G/DL (ref 6–8.5)
RBC # BLD AUTO: 4.91 X10E6/UL (ref 3.77–5.28)
RBC # BLD AUTO: 5 X10E6/UL (ref 3.77–5.28)
SL AMB EGFR AFRICAN AMERICAN: 114 ML/MIN/1.73
SL AMB EGFR NON AFRICAN AMERICAN: 99 ML/MIN/1.73
SODIUM SERPL-SCNC: 136 MMOL/L (ref 134–144)
TIBC SERPL-MCNC: 435 UG/DL (ref 250–450)
UIBC SERPL-MCNC: 397 UG/DL (ref 118–369)
WBC # BLD AUTO: 12.9 X10E3/UL (ref 3.4–10.8)
WBC # BLD AUTO: 13 X10E3/UL (ref 3.4–10.8)

## 2021-09-27 ENCOUNTER — TELEPHONE (OUTPATIENT)
Dept: GASTROENTEROLOGY | Facility: CLINIC | Age: 61
End: 2021-09-27

## 2021-09-27 DIAGNOSIS — D62 ACUTE BLOOD LOSS ANEMIA: Primary | ICD-10-CM

## 2021-09-27 NOTE — TELEPHONE ENCOUNTER
----- Message from Livia Singh MD sent at 9/27/2021  9:11 AM EDT -----  I called the patient and reviewed labs - hb -   stable over 12 --   ferritin in low   Advised to take iron daily - getting heart cath this week - call patient in 6 weeks or so to possibly schedule capsule - negative EGD and colonoscopy this summer   Repeat cbc /iron panel in 2 mo

## 2021-09-27 NOTE — RESULT ENCOUNTER NOTE
I called the patient and reviewed labs - hb -   stable over 12 --   ferritin in low   Advised to take iron daily - getting heart cath this week - call patient in 6 weeks or so to possibly schedule capsule - negative EGD and colonoscopy this summer   Repeat cbc /iron panel in 2 mo

## 2021-09-30 ENCOUNTER — HOSPITAL ENCOUNTER (OUTPATIENT)
Dept: NON INVASIVE DIAGNOSTICS | Facility: HOSPITAL | Age: 61
Discharge: HOME | End: 2021-09-30
Attending: INTERNAL MEDICINE | Admitting: INTERNAL MEDICINE
Payer: COMMERCIAL

## 2021-09-30 VITALS
TEMPERATURE: 99 F | RESPIRATION RATE: 18 BRPM | SYSTOLIC BLOOD PRESSURE: 169 MMHG | HEART RATE: 80 BPM | DIASTOLIC BLOOD PRESSURE: 70 MMHG | OXYGEN SATURATION: 100 %

## 2021-09-30 DIAGNOSIS — I27.20 PULMONARY HYPERTENSION (HCC): ICD-10-CM

## 2021-09-30 DIAGNOSIS — I50.32 CHRONIC DIASTOLIC HEART FAILURE (HCC): ICD-10-CM

## 2021-09-30 DIAGNOSIS — I50.32 CHRONIC DIASTOLIC CONGESTIVE HEART FAILURE (HCC): Primary | ICD-10-CM

## 2021-09-30 DIAGNOSIS — J96.11 CHRONIC RESPIRATORY FAILURE WITH HYPOXIA (HCC): ICD-10-CM

## 2021-09-30 DIAGNOSIS — I50.33 ACUTE ON CHRONIC DIASTOLIC HEART FAILURE (HCC): ICD-10-CM

## 2021-09-30 LAB
ATRIAL RATE: 80 BPM
P AXIS: 101 DEGREES
PR INTERVAL: 180 MS
QRS AXIS: 60 DEGREES
QRSD INTERVAL: 80 MS
QT INTERVAL: 384 MS
QTC INTERVAL: 442 MS
T WAVE AXIS: 75 DEGREES
VENTRICULAR RATE: 80 BPM

## 2021-09-30 PROCEDURE — C1894 INTRO/SHEATH, NON-LASER: HCPCS | Performed by: INTERNAL MEDICINE

## 2021-09-30 PROCEDURE — C1769 GUIDE WIRE: HCPCS | Performed by: INTERNAL MEDICINE

## 2021-09-30 PROCEDURE — 93005 ELECTROCARDIOGRAM TRACING: CPT

## 2021-09-30 PROCEDURE — 93451 RIGHT HEART CATH: CPT | Performed by: INTERNAL MEDICINE

## 2021-09-30 PROCEDURE — 93010 ELECTROCARDIOGRAM REPORT: CPT | Performed by: INTERNAL MEDICINE

## 2021-09-30 PROCEDURE — 82810 BLOOD GASES O2 SAT ONLY: CPT | Performed by: INTERNAL MEDICINE

## 2021-09-30 RX ORDER — TORSEMIDE 20 MG/1
60 TABLET ORAL 2 TIMES DAILY
Qty: 180 TABLET | Refills: 0 | Status: SHIPPED | OUTPATIENT
Start: 2021-09-30 | End: 2021-10-18 | Stop reason: SDUPTHER

## 2021-09-30 RX ORDER — LIDOCAINE HYDROCHLORIDE 10 MG/ML
INJECTION, SOLUTION EPIDURAL; INFILTRATION; INTRACAUDAL; PERINEURAL CODE/TRAUMA/SEDATION MEDICATION
Status: COMPLETED | OUTPATIENT
Start: 2021-09-30 | End: 2021-09-30

## 2021-09-30 RX ADMIN — LIDOCAINE HYDROCHLORIDE 7 ML: 10 INJECTION, SOLUTION EPIDURAL; INFILTRATION; INTRACAUDAL; PERINEURAL at 08:01

## 2021-10-01 ENCOUNTER — RA CDI HCC (OUTPATIENT)
Dept: OTHER | Facility: HOSPITAL | Age: 61
End: 2021-10-01

## 2021-10-04 ENCOUNTER — TELEPHONE (OUTPATIENT)
Dept: FAMILY MEDICINE CLINIC | Facility: HOSPITAL | Age: 61
End: 2021-10-04

## 2021-10-06 DIAGNOSIS — Z79.899 ENCOUNTER FOR LONG-TERM (CURRENT) DRUG USE: ICD-10-CM

## 2021-10-06 RX ORDER — LORAZEPAM 0.5 MG/1
TABLET ORAL
Qty: 90 TABLET | Refills: 0 | Status: SHIPPED | OUTPATIENT
Start: 2021-10-06 | End: 2021-11-03 | Stop reason: SDUPTHER

## 2021-10-07 DIAGNOSIS — E11.65 TYPE 2 DIABETES MELLITUS WITH HYPERGLYCEMIA, WITHOUT LONG-TERM CURRENT USE OF INSULIN (HCC): ICD-10-CM

## 2021-10-07 RX ORDER — GLIMEPIRIDE 4 MG/1
TABLET ORAL
Qty: 30 TABLET | Refills: 0 | Status: SHIPPED | OUTPATIENT
Start: 2021-10-07 | End: 2021-11-02

## 2021-10-08 ENCOUNTER — OFFICE VISIT (OUTPATIENT)
Dept: FAMILY MEDICINE CLINIC | Facility: HOSPITAL | Age: 61
End: 2021-10-08
Payer: COMMERCIAL

## 2021-10-08 VITALS
SYSTOLIC BLOOD PRESSURE: 122 MMHG | WEIGHT: 288.4 LBS | HEIGHT: 63 IN | HEART RATE: 88 BPM | OXYGEN SATURATION: 88 % | BODY MASS INDEX: 51.1 KG/M2 | DIASTOLIC BLOOD PRESSURE: 80 MMHG | TEMPERATURE: 97.4 F

## 2021-10-08 DIAGNOSIS — Z79.4 TYPE 2 DIABETES MELLITUS WITH HYPERGLYCEMIA, WITH LONG-TERM CURRENT USE OF INSULIN (HCC): ICD-10-CM

## 2021-10-08 DIAGNOSIS — E78.00 HYPERCHOLESTEROLEMIA: ICD-10-CM

## 2021-10-08 DIAGNOSIS — E11.65 TYPE 2 DIABETES MELLITUS WITH HYPERGLYCEMIA, WITH LONG-TERM CURRENT USE OF INSULIN (HCC): ICD-10-CM

## 2021-10-08 DIAGNOSIS — J96.11 CHRONIC RESPIRATORY FAILURE WITH HYPOXIA (HCC): ICD-10-CM

## 2021-10-08 DIAGNOSIS — J45.51 SEVERE PERSISTENT ASTHMA WITH EXACERBATION: ICD-10-CM

## 2021-10-08 DIAGNOSIS — F32.1 CURRENT MODERATE EPISODE OF MAJOR DEPRESSIVE DISORDER WITHOUT PRIOR EPISODE (HCC): Primary | ICD-10-CM

## 2021-10-08 DIAGNOSIS — I13.0 HYPERTENSIVE HEART AND CHRONIC KIDNEY DISEASE WITH HEART FAILURE AND STAGE 1 THROUGH STAGE 4 CHRONIC KIDNEY DISEASE, OR CHRONIC KIDNEY DISEASE (HCC): ICD-10-CM

## 2021-10-08 DIAGNOSIS — I50.32 CHRONIC DIASTOLIC CONGESTIVE HEART FAILURE (HCC): ICD-10-CM

## 2021-10-08 DIAGNOSIS — I48.0 PAROXYSMAL ATRIAL FIBRILLATION (HCC): ICD-10-CM

## 2021-10-08 PROCEDURE — 99214 OFFICE O/P EST MOD 30 MIN: CPT | Performed by: FAMILY MEDICINE

## 2021-10-08 PROCEDURE — 3725F SCREEN DEPRESSION PERFORMED: CPT | Performed by: FAMILY MEDICINE

## 2021-10-08 PROCEDURE — 3066F NEPHROPATHY DOC TX: CPT | Performed by: INTERNAL MEDICINE

## 2021-10-12 ENCOUNTER — TELEPHONE (OUTPATIENT)
Dept: NON INVASIVE DIAGNOSTICS | Facility: HOSPITAL | Age: 61
End: 2021-10-12

## 2021-10-12 DIAGNOSIS — I50.32 CHRONIC DIASTOLIC CONGESTIVE HEART FAILURE (HCC): Primary | ICD-10-CM

## 2021-10-16 LAB
BUN SERPL-MCNC: 13 MG/DL (ref 8–27)
BUN/CREAT SERPL: 21 (ref 12–28)
CALCIUM SERPL-MCNC: 9.4 MG/DL (ref 8.7–10.3)
CHLORIDE SERPL-SCNC: 88 MMOL/L (ref 96–106)
CO2 SERPL-SCNC: 34 MMOL/L (ref 20–29)
CREAT SERPL-MCNC: 0.62 MG/DL (ref 0.57–1)
GLUCOSE SERPL-MCNC: 270 MG/DL (ref 65–99)
POTASSIUM SERPL-SCNC: 4.1 MMOL/L (ref 3.5–5.2)
SL AMB EGFR AFRICAN AMERICAN: 112 ML/MIN/1.73
SL AMB EGFR NON AFRICAN AMERICAN: 97 ML/MIN/1.73
SODIUM SERPL-SCNC: 136 MMOL/L (ref 134–144)

## 2021-10-18 ENCOUNTER — OFFICE VISIT (OUTPATIENT)
Dept: CARDIOLOGY CLINIC | Facility: CLINIC | Age: 61
End: 2021-10-18
Payer: COMMERCIAL

## 2021-10-18 VITALS
BODY MASS INDEX: 51.17 KG/M2 | WEIGHT: 288.8 LBS | SYSTOLIC BLOOD PRESSURE: 148 MMHG | HEART RATE: 85 BPM | DIASTOLIC BLOOD PRESSURE: 58 MMHG | HEIGHT: 63 IN

## 2021-10-18 DIAGNOSIS — I48.0 PAROXYSMAL ATRIAL FIBRILLATION (HCC): ICD-10-CM

## 2021-10-18 DIAGNOSIS — J96.11 CHRONIC RESPIRATORY FAILURE WITH HYPOXIA AND HYPERCAPNIA (HCC): ICD-10-CM

## 2021-10-18 DIAGNOSIS — J96.12 CHRONIC RESPIRATORY FAILURE WITH HYPOXIA AND HYPERCAPNIA (HCC): ICD-10-CM

## 2021-10-18 DIAGNOSIS — I27.20 PULMONARY HYPERTENSION (HCC): ICD-10-CM

## 2021-10-18 DIAGNOSIS — I50.32 CHRONIC DIASTOLIC HEART FAILURE (HCC): Primary | ICD-10-CM

## 2021-10-18 PROCEDURE — 93000 ELECTROCARDIOGRAM COMPLETE: CPT | Performed by: INTERNAL MEDICINE

## 2021-10-18 PROCEDURE — 3077F SYST BP >= 140 MM HG: CPT | Performed by: INTERNAL MEDICINE

## 2021-10-18 PROCEDURE — 3008F BODY MASS INDEX DOCD: CPT | Performed by: INTERNAL MEDICINE

## 2021-10-18 PROCEDURE — 1036F TOBACCO NON-USER: CPT | Performed by: INTERNAL MEDICINE

## 2021-10-18 PROCEDURE — 3078F DIAST BP <80 MM HG: CPT | Performed by: INTERNAL MEDICINE

## 2021-10-18 PROCEDURE — 99214 OFFICE O/P EST MOD 30 MIN: CPT | Performed by: INTERNAL MEDICINE

## 2021-10-18 RX ORDER — POTASSIUM CHLORIDE 20 MEQ/1
20 TABLET, EXTENDED RELEASE ORAL DAILY
Qty: 5 TABLET | Refills: 0 | Status: SHIPPED | OUTPATIENT
Start: 2021-10-18 | End: 2021-10-26

## 2021-10-18 RX ORDER — TORSEMIDE 20 MG/1
60 TABLET ORAL 2 TIMES DAILY
Qty: 180 TABLET | Refills: 6 | Status: SHIPPED | OUTPATIENT
Start: 2021-10-18 | End: 2022-02-05

## 2021-10-18 RX ORDER — METOLAZONE 5 MG/1
5 TABLET ORAL DAILY
Qty: 5 TABLET | Refills: 0 | Status: SHIPPED | OUTPATIENT
Start: 2021-10-18

## 2021-10-21 ENCOUNTER — TELEPHONE (OUTPATIENT)
Dept: FAMILY MEDICINE CLINIC | Facility: HOSPITAL | Age: 61
End: 2021-10-21

## 2021-10-21 DIAGNOSIS — G47.00 INSOMNIA, UNSPECIFIED TYPE: ICD-10-CM

## 2021-10-21 DIAGNOSIS — I48.91 ATRIAL FIBRILLATION, UNSPECIFIED TYPE (HCC): ICD-10-CM

## 2021-10-21 RX ORDER — FLECAINIDE ACETATE 100 MG/1
100 TABLET ORAL EVERY 12 HOURS SCHEDULED
Qty: 180 TABLET | Refills: 1 | Status: SHIPPED | OUTPATIENT
Start: 2021-10-21

## 2021-10-21 RX ORDER — TRAZODONE HYDROCHLORIDE 150 MG/1
150 TABLET ORAL
Qty: 90 TABLET | Refills: 0 | Status: SHIPPED | OUTPATIENT
Start: 2021-10-21 | End: 2022-01-18

## 2021-10-26 ENCOUNTER — TELEPHONE (OUTPATIENT)
Dept: CARDIOLOGY CLINIC | Facility: CLINIC | Age: 61
End: 2021-10-26

## 2021-10-26 DIAGNOSIS — I50.32 CHRONIC DIASTOLIC CONGESTIVE HEART FAILURE (HCC): ICD-10-CM

## 2021-10-26 DIAGNOSIS — I27.20 PULMONARY HYPERTENSION (HCC): Primary | ICD-10-CM

## 2021-10-26 LAB
BUN SERPL-MCNC: 30 MG/DL (ref 8–27)
BUN/CREAT SERPL: 36 (ref 12–28)
CALCIUM SERPL-MCNC: 10 MG/DL (ref 8.7–10.3)
CHLORIDE SERPL-SCNC: 69 MMOL/L (ref 96–106)
CO2 SERPL-SCNC: 44 MMOL/L (ref 20–29)
CREAT SERPL-MCNC: 0.83 MG/DL (ref 0.57–1)
GLUCOSE SERPL-MCNC: 393 MG/DL (ref 65–99)
POTASSIUM SERPL-SCNC: 3.3 MMOL/L (ref 3.5–5.2)
SL AMB EGFR AFRICAN AMERICAN: 87 ML/MIN/1.73
SL AMB EGFR NON AFRICAN AMERICAN: 76 ML/MIN/1.73
SODIUM SERPL-SCNC: 129 MMOL/L (ref 134–144)

## 2021-10-26 RX ORDER — POTASSIUM CHLORIDE 20 MEQ/1
20 TABLET, EXTENDED RELEASE ORAL 2 TIMES DAILY
Qty: 6 TABLET | Refills: 0 | Status: SHIPPED | OUTPATIENT
Start: 2021-10-26 | End: 2021-10-29

## 2021-11-02 DIAGNOSIS — E11.65 TYPE 2 DIABETES MELLITUS WITH HYPERGLYCEMIA, WITHOUT LONG-TERM CURRENT USE OF INSULIN (HCC): ICD-10-CM

## 2021-11-02 DIAGNOSIS — Z79.899 ENCOUNTER FOR LONG-TERM (CURRENT) DRUG USE: ICD-10-CM

## 2021-11-02 LAB
ALBUMIN SERPL-MCNC: 4 G/DL (ref 3.8–4.8)
ALBUMIN/CREAT UR: <24 MG/G CREAT (ref 0–29)
ALBUMIN/GLOB SERPL: 2 {RATIO} (ref 1.2–2.2)
ALP SERPL-CCNC: 150 IU/L (ref 44–121)
ALT SERPL-CCNC: 139 IU/L (ref 0–32)
AST SERPL-CCNC: 69 IU/L (ref 0–40)
BASOPHILS # BLD AUTO: 0.1 X10E3/UL (ref 0–0.2)
BASOPHILS NFR BLD AUTO: 1 %
BILIRUB SERPL-MCNC: 0.5 MG/DL (ref 0–1.2)
BUN SERPL-MCNC: 10 MG/DL (ref 8–27)
BUN/CREAT SERPL: 17 (ref 12–28)
CALCIUM SERPL-MCNC: 9 MG/DL (ref 8.7–10.3)
CHLORIDE SERPL-SCNC: 89 MMOL/L (ref 96–106)
CHOLEST SERPL-MCNC: 106 MG/DL (ref 100–199)
CO2 SERPL-SCNC: 33 MMOL/L (ref 20–29)
CREAT SERPL-MCNC: 0.59 MG/DL (ref 0.57–1)
CREAT UR-MCNC: 12.6 MG/DL
EOSINOPHIL # BLD AUTO: 0.3 X10E3/UL (ref 0–0.4)
EOSINOPHIL NFR BLD AUTO: 2 %
ERYTHROCYTE [DISTWIDTH] IN BLOOD BY AUTOMATED COUNT: 15.4 % (ref 11.7–15.4)
ERYTHROCYTE [DISTWIDTH] IN BLOOD BY AUTOMATED COUNT: 15.5 % (ref 11.7–15.4)
EST. AVERAGE GLUCOSE BLD GHB EST-MCNC: 280 MG/DL
FERRITIN SERPL-MCNC: 84 NG/ML (ref 15–150)
GLOBULIN SER-MCNC: 2 G/DL (ref 1.5–4.5)
GLUCOSE SERPL-MCNC: 364 MG/DL (ref 65–99)
HBA1C MFR BLD: 11.4 % (ref 4.8–5.6)
HCT VFR BLD AUTO: 36.3 % (ref 34–46.6)
HCT VFR BLD AUTO: 37.2 % (ref 34–46.6)
HDLC SERPL-MCNC: 47 MG/DL
HGB BLD-MCNC: 11.4 G/DL (ref 11.1–15.9)
HGB BLD-MCNC: 11.4 G/DL (ref 11.1–15.9)
IMM GRANULOCYTES # BLD: 0.1 X10E3/UL (ref 0–0.1)
IMM GRANULOCYTES NFR BLD: 1 %
IRON SATN MFR SERPL: 7 % (ref 15–55)
IRON SERPL-MCNC: 31 UG/DL (ref 27–139)
LDLC SERPL CALC-MCNC: 38 MG/DL (ref 0–99)
LDLC/HDLC SERPL: 0.8 RATIO (ref 0–3.2)
LYMPHOCYTES # BLD AUTO: 1.3 X10E3/UL (ref 0.7–3.1)
LYMPHOCYTES NFR BLD AUTO: 11 %
MCH RBC QN AUTO: 24.7 PG (ref 26.6–33)
MCH RBC QN AUTO: 25 PG (ref 26.6–33)
MCHC RBC AUTO-ENTMCNC: 30.6 G/DL (ref 31.5–35.7)
MCHC RBC AUTO-ENTMCNC: 31.4 G/DL (ref 31.5–35.7)
MCV RBC AUTO: 80 FL (ref 79–97)
MCV RBC AUTO: 81 FL (ref 79–97)
MICROALBUMIN UR-MCNC: <3 UG/ML
MONOCYTES # BLD AUTO: 0.6 X10E3/UL (ref 0.1–0.9)
MONOCYTES NFR BLD AUTO: 5 %
NEUTROPHILS # BLD AUTO: 9.6 X10E3/UL (ref 1.4–7)
NEUTROPHILS NFR BLD AUTO: 80 %
PLATELET # BLD AUTO: 284 X10E3/UL (ref 150–450)
PLATELET # BLD AUTO: 298 X10E3/UL (ref 150–450)
POTASSIUM SERPL-SCNC: 4.7 MMOL/L (ref 3.5–5.2)
PROT SERPL-MCNC: 6 G/DL (ref 6–8.5)
RBC # BLD AUTO: 4.56 X10E6/UL (ref 3.77–5.28)
RBC # BLD AUTO: 4.62 X10E6/UL (ref 3.77–5.28)
SL AMB EGFR AFRICAN AMERICAN: 114 ML/MIN/1.73
SL AMB EGFR NON AFRICAN AMERICAN: 99 ML/MIN/1.73
SL AMB VLDL CHOLESTEROL CALC: 21 MG/DL (ref 5–40)
SODIUM SERPL-SCNC: 136 MMOL/L (ref 134–144)
TIBC SERPL-MCNC: 422 UG/DL (ref 250–450)
TRIGL SERPL-MCNC: 117 MG/DL (ref 0–149)
TSH SERPL DL<=0.005 MIU/L-ACNC: 2.64 UIU/ML (ref 0.45–4.5)
UIBC SERPL-MCNC: 391 UG/DL (ref 118–369)
WBC # BLD AUTO: 11.9 X10E3/UL (ref 3.4–10.8)
WBC # BLD AUTO: 12.3 X10E3/UL (ref 3.4–10.8)

## 2021-11-02 PROCEDURE — 3046F HEMOGLOBIN A1C LEVEL >9.0%: CPT | Performed by: INTERNAL MEDICINE

## 2021-11-02 RX ORDER — GLIMEPIRIDE 4 MG/1
TABLET ORAL
Qty: 30 TABLET | Refills: 0 | Status: SHIPPED | OUTPATIENT
Start: 2021-11-02 | End: 2021-11-15 | Stop reason: SDUPTHER

## 2021-11-03 RX ORDER — LORAZEPAM 0.5 MG/1
TABLET ORAL
Qty: 90 TABLET | Refills: 0 | Status: SHIPPED | OUTPATIENT
Start: 2021-11-03 | End: 2021-11-30 | Stop reason: SDUPTHER

## 2021-11-03 NOTE — RESULT ENCOUNTER NOTE
I called the patient and left voice message  Hemoglobin is stable at 11 4  The slightly lost than a month ago  Iron saturation is low consistent with iron deficiency  She should continue on iron  Would like her to get a capsule endoscopy but not quite sure of her schedule  Will call patient to see her status at this time  Pietro Mcfarland F )  arrange capsule endoscopy    Thanks

## 2021-11-04 DIAGNOSIS — E66.9 DIABETES MELLITUS TYPE 2 IN OBESE (HCC): ICD-10-CM

## 2021-11-04 DIAGNOSIS — E11.69 DIABETES MELLITUS TYPE 2 IN OBESE (HCC): ICD-10-CM

## 2021-11-04 RX ORDER — ATORVASTATIN CALCIUM 40 MG/1
TABLET, FILM COATED ORAL
Qty: 60 TABLET | Refills: 5 | Status: SHIPPED | OUTPATIENT
Start: 2021-11-04 | End: 2021-11-29

## 2021-11-04 RX ORDER — ATORVASTATIN CALCIUM 40 MG/1
TABLET, FILM COATED ORAL
Qty: 60 TABLET | Refills: 5 | Status: CANCELLED | OUTPATIENT
Start: 2021-11-04

## 2021-11-04 NOTE — TELEPHONE ENCOUNTER
Nicho Antonio reviewed recent labs and left message for pt he wanted to proceed with capsule  Info submitted to [facility] for approval   Message left for patient that after approval received I would contact her to schedule capsule

## 2021-11-05 ENCOUNTER — OFFICE VISIT (OUTPATIENT)
Dept: PULMONOLOGY | Facility: HOSPITAL | Age: 61
End: 2021-11-05
Payer: COMMERCIAL

## 2021-11-05 VITALS
OXYGEN SATURATION: 98 % | WEIGHT: 290 LBS | TEMPERATURE: 97.6 F | HEART RATE: 76 BPM | SYSTOLIC BLOOD PRESSURE: 140 MMHG | BODY MASS INDEX: 51.38 KG/M2 | DIASTOLIC BLOOD PRESSURE: 98 MMHG | HEIGHT: 63 IN

## 2021-11-05 DIAGNOSIS — J96.11 CHRONIC RESPIRATORY FAILURE WITH HYPOXIA (HCC): ICD-10-CM

## 2021-11-05 DIAGNOSIS — I27.20 PULMONARY HYPERTENSION (HCC): ICD-10-CM

## 2021-11-05 DIAGNOSIS — J44.9 ASTHMA-COPD OVERLAP SYNDROME (HCC): ICD-10-CM

## 2021-11-05 DIAGNOSIS — E66.01 MORBID OBESITY (HCC): ICD-10-CM

## 2021-11-05 DIAGNOSIS — J44.9 COPD, SEVERE (HCC): Primary | ICD-10-CM

## 2021-11-05 DIAGNOSIS — G47.33 OBSTRUCTIVE SLEEP APNEA: ICD-10-CM

## 2021-11-05 PROCEDURE — 99214 OFFICE O/P EST MOD 30 MIN: CPT | Performed by: PHYSICIAN ASSISTANT

## 2021-11-05 PROCEDURE — 3077F SYST BP >= 140 MM HG: CPT | Performed by: PHYSICIAN ASSISTANT

## 2021-11-05 PROCEDURE — 3080F DIAST BP >= 90 MM HG: CPT | Performed by: PHYSICIAN ASSISTANT

## 2021-11-05 RX ORDER — ALBUTEROL SULFATE 2.5 MG/3ML
2.5 SOLUTION RESPIRATORY (INHALATION) EVERY 6 HOURS PRN
Qty: 1080 ML | Refills: 3 | Status: SHIPPED | OUTPATIENT
Start: 2021-11-05

## 2021-11-06 DIAGNOSIS — E11.65 TYPE 2 DIABETES MELLITUS WITH HYPERGLYCEMIA, UNSPECIFIED WHETHER LONG TERM INSULIN USE (HCC): ICD-10-CM

## 2021-11-08 RX ORDER — BLOOD SUGAR DIAGNOSTIC
STRIP MISCELLANEOUS
Qty: 100 STRIP | Refills: 3 | Status: SHIPPED | OUTPATIENT
Start: 2021-11-08 | End: 2022-03-18

## 2021-11-11 NOTE — TELEPHONE ENCOUNTER
Received auth from [facility]  Pt contacted and scheduled capsule for 11/19/2021  Instructions mailed to patient

## 2021-11-15 ENCOUNTER — CONSULT (OUTPATIENT)
Dept: ENDOCRINOLOGY | Facility: HOSPITAL | Age: 61
End: 2021-11-15
Payer: COMMERCIAL

## 2021-11-15 VITALS
HEIGHT: 63 IN | BODY MASS INDEX: 51.49 KG/M2 | SYSTOLIC BLOOD PRESSURE: 124 MMHG | HEART RATE: 82 BPM | WEIGHT: 290.6 LBS | DIASTOLIC BLOOD PRESSURE: 62 MMHG

## 2021-11-15 DIAGNOSIS — E11.65 TYPE 2 DIABETES MELLITUS WITH HYPERGLYCEMIA, WITHOUT LONG-TERM CURRENT USE OF INSULIN (HCC): ICD-10-CM

## 2021-11-15 DIAGNOSIS — Z79.4 TYPE 2 DIABETES MELLITUS WITH STAGE 2 CHRONIC KIDNEY DISEASE, WITH LONG-TERM CURRENT USE OF INSULIN (HCC): ICD-10-CM

## 2021-11-15 DIAGNOSIS — E78.00 HYPERCHOLESTEROLEMIA: ICD-10-CM

## 2021-11-15 DIAGNOSIS — E11.42 DIABETIC POLYNEUROPATHY ASSOCIATED WITH TYPE 2 DIABETES MELLITUS (HCC): ICD-10-CM

## 2021-11-15 DIAGNOSIS — B35.4 TINEA CORPORIS: ICD-10-CM

## 2021-11-15 DIAGNOSIS — E11.22 TYPE 2 DIABETES MELLITUS WITH STAGE 2 CHRONIC KIDNEY DISEASE, WITHOUT LONG-TERM CURRENT USE OF INSULIN (HCC): ICD-10-CM

## 2021-11-15 DIAGNOSIS — N18.2 TYPE 2 DIABETES MELLITUS WITH STAGE 2 CHRONIC KIDNEY DISEASE, WITH LONG-TERM CURRENT USE OF INSULIN (HCC): ICD-10-CM

## 2021-11-15 DIAGNOSIS — E11.22 TYPE 2 DIABETES MELLITUS WITH STAGE 2 CHRONIC KIDNEY DISEASE, WITH LONG-TERM CURRENT USE OF INSULIN (HCC): ICD-10-CM

## 2021-11-15 DIAGNOSIS — N18.2 TYPE 2 DIABETES MELLITUS WITH STAGE 2 CHRONIC KIDNEY DISEASE, WITHOUT LONG-TERM CURRENT USE OF INSULIN (HCC): ICD-10-CM

## 2021-11-15 DIAGNOSIS — Z79.4 TYPE 2 DIABETES MELLITUS WITH HYPERGLYCEMIA, WITH LONG-TERM CURRENT USE OF INSULIN (HCC): Primary | ICD-10-CM

## 2021-11-15 DIAGNOSIS — E11.65 TYPE 2 DIABETES MELLITUS WITH HYPERGLYCEMIA, WITH LONG-TERM CURRENT USE OF INSULIN (HCC): Primary | ICD-10-CM

## 2021-11-15 PROCEDURE — 99245 OFF/OP CONSLTJ NEW/EST HI 55: CPT | Performed by: INTERNAL MEDICINE

## 2021-11-15 PROCEDURE — 1036F TOBACCO NON-USER: CPT | Performed by: INTERNAL MEDICINE

## 2021-11-15 PROCEDURE — 3066F NEPHROPATHY DOC TX: CPT | Performed by: INTERNAL MEDICINE

## 2021-11-15 RX ORDER — PEN NEEDLE, DIABETIC 32GX 5/32"
NEEDLE, DISPOSABLE MISCELLANEOUS
Qty: 90 EACH | Refills: 1 | Status: SHIPPED | OUTPATIENT
Start: 2021-11-15 | End: 2022-02-03 | Stop reason: SDUPTHER

## 2021-11-15 RX ORDER — GLIMEPIRIDE 4 MG/1
8 TABLET ORAL
Qty: 60 TABLET | Refills: 6 | Status: SHIPPED | OUTPATIENT
Start: 2021-11-15 | End: 2022-05-07

## 2021-11-19 ENCOUNTER — CLINICAL SUPPORT (OUTPATIENT)
Dept: GASTROENTEROLOGY | Facility: CLINIC | Age: 61
End: 2021-11-19
Payer: COMMERCIAL

## 2021-11-19 DIAGNOSIS — D50.9 IRON DEFICIENCY ANEMIA, UNSPECIFIED IRON DEFICIENCY ANEMIA TYPE: ICD-10-CM

## 2021-11-19 DIAGNOSIS — K62.5 RECTAL BLEEDING: ICD-10-CM

## 2021-11-19 PROCEDURE — 91110 GI TRC IMG INTRAL ESOPH-ILE: CPT | Performed by: INTERNAL MEDICINE

## 2021-11-23 ENCOUNTER — OFFICE VISIT (OUTPATIENT)
Dept: DIABETES SERVICES | Facility: HOSPITAL | Age: 61
End: 2021-11-23
Payer: COMMERCIAL

## 2021-11-23 VITALS — HEIGHT: 63 IN | BODY MASS INDEX: 51.38 KG/M2 | WEIGHT: 290 LBS

## 2021-11-23 DIAGNOSIS — E11.65 TYPE 2 DIABETES MELLITUS WITH HYPERGLYCEMIA, WITH LONG-TERM CURRENT USE OF INSULIN (HCC): Primary | ICD-10-CM

## 2021-11-23 DIAGNOSIS — Z79.4 TYPE 2 DIABETES MELLITUS WITH HYPERGLYCEMIA, WITH LONG-TERM CURRENT USE OF INSULIN (HCC): Primary | ICD-10-CM

## 2021-11-23 PROCEDURE — 3008F BODY MASS INDEX DOCD: CPT | Performed by: INTERNAL MEDICINE

## 2021-11-23 PROCEDURE — 97802 MEDICAL NUTRITION INDIV IN: CPT | Performed by: DIETITIAN, REGISTERED

## 2021-11-29 DIAGNOSIS — E66.9 DIABETES MELLITUS TYPE 2 IN OBESE (HCC): ICD-10-CM

## 2021-11-29 DIAGNOSIS — E11.69 DIABETES MELLITUS TYPE 2 IN OBESE (HCC): ICD-10-CM

## 2021-11-29 RX ORDER — ATORVASTATIN CALCIUM 80 MG/1
TABLET, FILM COATED ORAL
Qty: 90 TABLET | Refills: 1 | Status: SHIPPED | OUTPATIENT
Start: 2021-11-29 | End: 2021-12-16 | Stop reason: SDUPTHER

## 2021-11-30 ENCOUNTER — TELEPHONE (OUTPATIENT)
Dept: FAMILY MEDICINE CLINIC | Facility: HOSPITAL | Age: 61
End: 2021-11-30

## 2021-11-30 DIAGNOSIS — J45.51 SEVERE PERSISTENT ASTHMA WITH ACUTE EXACERBATION: ICD-10-CM

## 2021-11-30 DIAGNOSIS — Z79.899 ENCOUNTER FOR LONG-TERM (CURRENT) DRUG USE: ICD-10-CM

## 2021-12-01 RX ORDER — LORAZEPAM 0.5 MG/1
TABLET ORAL
Qty: 90 TABLET | Refills: 0 | Status: SHIPPED | OUTPATIENT
Start: 2021-12-01 | End: 2021-12-29 | Stop reason: SDUPTHER

## 2021-12-01 RX ORDER — MONTELUKAST SODIUM 10 MG/1
10 TABLET ORAL
Qty: 90 TABLET | Refills: 3 | Status: SHIPPED | OUTPATIENT
Start: 2021-12-01

## 2021-12-02 ENCOUNTER — TELEPHONE (OUTPATIENT)
Dept: GASTROENTEROLOGY | Facility: CLINIC | Age: 61
End: 2021-12-02

## 2021-12-14 ENCOUNTER — TELEPHONE (OUTPATIENT)
Dept: CARDIOLOGY CLINIC | Facility: CLINIC | Age: 61
End: 2021-12-14

## 2021-12-14 ENCOUNTER — TELEPHONE (OUTPATIENT)
Dept: FAMILY MEDICINE CLINIC | Facility: HOSPITAL | Age: 61
End: 2021-12-14

## 2021-12-14 DIAGNOSIS — E66.9 DIABETES MELLITUS TYPE 2 IN OBESE (HCC): ICD-10-CM

## 2021-12-14 DIAGNOSIS — E11.69 DIABETES MELLITUS TYPE 2 IN OBESE (HCC): ICD-10-CM

## 2021-12-16 ENCOUNTER — CLINICAL SUPPORT (OUTPATIENT)
Dept: PULMONOLOGY | Facility: HOSPITAL | Age: 61
End: 2021-12-16

## 2021-12-16 ENCOUNTER — TELEPHONE (OUTPATIENT)
Dept: FAMILY MEDICINE CLINIC | Facility: HOSPITAL | Age: 61
End: 2021-12-16

## 2021-12-16 ENCOUNTER — TELEPHONE (OUTPATIENT)
Dept: ENDOCRINOLOGY | Facility: HOSPITAL | Age: 61
End: 2021-12-16

## 2021-12-16 DIAGNOSIS — J44.9 COPD, SEVERE (HCC): Primary | ICD-10-CM

## 2021-12-16 RX ORDER — ATORVASTATIN CALCIUM 80 MG/1
TABLET, FILM COATED ORAL
Qty: 90 TABLET | Refills: 1
Start: 2021-12-16 | End: 2022-01-28 | Stop reason: SDUPTHER

## 2021-12-17 DIAGNOSIS — E11.22 TYPE 2 DIABETES MELLITUS WITH STAGE 2 CHRONIC KIDNEY DISEASE, WITH LONG-TERM CURRENT USE OF INSULIN (HCC): Primary | ICD-10-CM

## 2021-12-17 DIAGNOSIS — Z79.4 TYPE 2 DIABETES MELLITUS WITH STAGE 2 CHRONIC KIDNEY DISEASE, WITH LONG-TERM CURRENT USE OF INSULIN (HCC): Primary | ICD-10-CM

## 2021-12-17 DIAGNOSIS — N18.2 TYPE 2 DIABETES MELLITUS WITH STAGE 2 CHRONIC KIDNEY DISEASE, WITH LONG-TERM CURRENT USE OF INSULIN (HCC): Primary | ICD-10-CM

## 2021-12-17 PROCEDURE — 3066F NEPHROPATHY DOC TX: CPT | Performed by: PHYSICIAN ASSISTANT

## 2021-12-17 RX ORDER — INSULIN LISPRO 100 [IU]/ML
8 INJECTION, SOLUTION INTRAVENOUS; SUBCUTANEOUS
Qty: 15 ML | Refills: 3 | Status: SHIPPED | OUTPATIENT
Start: 2021-12-17 | End: 2022-02-03 | Stop reason: SDUPTHER

## 2021-12-20 ENCOUNTER — CLINICAL SUPPORT (OUTPATIENT)
Dept: PULMONOLOGY | Facility: HOSPITAL | Age: 61
End: 2021-12-20
Payer: COMMERCIAL

## 2021-12-20 DIAGNOSIS — F32.1 CURRENT MODERATE EPISODE OF MAJOR DEPRESSIVE DISORDER WITHOUT PRIOR EPISODE (HCC): ICD-10-CM

## 2021-12-20 DIAGNOSIS — J44.9 ASTHMA-COPD OVERLAP SYNDROME (HCC): ICD-10-CM

## 2021-12-20 PROCEDURE — G0424 PULMONARY REHAB W EXER: HCPCS

## 2021-12-20 RX ORDER — ESCITALOPRAM OXALATE 10 MG/1
TABLET ORAL
Qty: 30 TABLET | Refills: 1 | Status: SHIPPED | OUTPATIENT
Start: 2021-12-20 | End: 2022-01-31

## 2021-12-22 ENCOUNTER — CLINICAL SUPPORT (OUTPATIENT)
Dept: PULMONOLOGY | Facility: HOSPITAL | Age: 61
End: 2021-12-22
Payer: COMMERCIAL

## 2021-12-22 DIAGNOSIS — J44.9 CHRONIC OBSTRUCTIVE PULMONARY DISEASE, UNSPECIFIED COPD TYPE (HCC): ICD-10-CM

## 2021-12-22 PROCEDURE — G0424 PULMONARY REHAB W EXER: HCPCS

## 2021-12-27 ENCOUNTER — APPOINTMENT (OUTPATIENT)
Dept: PULMONOLOGY | Facility: HOSPITAL | Age: 61
End: 2021-12-27
Payer: COMMERCIAL

## 2021-12-29 ENCOUNTER — CLINICAL SUPPORT (OUTPATIENT)
Dept: PULMONOLOGY | Facility: HOSPITAL | Age: 61
End: 2021-12-29
Payer: COMMERCIAL

## 2021-12-29 DIAGNOSIS — Z79.899 ENCOUNTER FOR LONG-TERM (CURRENT) DRUG USE: ICD-10-CM

## 2021-12-29 DIAGNOSIS — J44.9 ASTHMA-COPD OVERLAP SYNDROME (HCC): ICD-10-CM

## 2021-12-29 PROCEDURE — G0424 PULMONARY REHAB W EXER: HCPCS

## 2021-12-30 ENCOUNTER — TELEPHONE (OUTPATIENT)
Dept: ENDOCRINOLOGY | Facility: HOSPITAL | Age: 61
End: 2021-12-30

## 2022-01-03 ENCOUNTER — CLINICAL SUPPORT (OUTPATIENT)
Dept: PULMONOLOGY | Facility: HOSPITAL | Age: 62
End: 2022-01-03
Payer: COMMERCIAL

## 2022-01-03 DIAGNOSIS — J44.9 CHRONIC OBSTRUCTIVE PULMONARY DISEASE, UNSPECIFIED COPD TYPE (HCC): ICD-10-CM

## 2022-01-03 PROCEDURE — 94625 PHY/QHP OP PULM RHB W/O MNTR: CPT

## 2022-01-03 RX ORDER — LORAZEPAM 0.5 MG/1
TABLET ORAL
Qty: 90 TABLET | Refills: 0 | Status: SHIPPED | OUTPATIENT
Start: 2022-01-03 | End: 2022-01-25 | Stop reason: SDUPTHER

## 2022-01-05 ENCOUNTER — CLINICAL SUPPORT (OUTPATIENT)
Dept: PULMONOLOGY | Facility: HOSPITAL | Age: 62
End: 2022-01-05
Payer: COMMERCIAL

## 2022-01-05 DIAGNOSIS — J44.9 CHRONIC OBSTRUCTIVE PULMONARY DISEASE, UNSPECIFIED COPD TYPE (HCC): ICD-10-CM

## 2022-01-05 PROCEDURE — 94625 PHY/QHP OP PULM RHB W/O MNTR: CPT

## 2022-01-06 NOTE — PROGRESS NOTES
Pulmonary Rehabilitation Plan of Care   30 Day Reassessment      Today's date: 2022   # of Exercise Sessions Completed: 5  Patient name: Heydi Acosta      : 1960  Age: 59 y o  MRN: [id number]  Referring Physician: Shannan Lerma PA-C  Pulmonologist: Tamra Pugh MD  Provider: Elis  Clinician: Christina Stack CRT    Dx:   Encounter Diagnosis   Name Primary?  Chronic obstructive pulmonary disease, unspecified COPD type ([facility] )      Date of onset: 2021      SUMMARY OF PROGRESS:  Ms Tigist Rosa is being evaluated  for 30 days progress in  pulmonary rehab  To date she has completed 5 exercise sessions  She is being referred with a  diagnosis of COPD/ Asthma overlap  Ms Tigist Rosa wear 4 lpm oxygen  and also CPAP HS for WILMA  She has a history of Type 2 Daibetes but does not take daily sugars  Ms Tigist Rosa also has a history of A fib, pulmonary hypertension and CHF  She reports some issues with her knees and back as well  Ms Joy's  goals for entering the program are to be able to walk to the mailbox and back which involves and incline as well as to go to play pool with friends  She also hopes to get into better shape and increase endurance  An ETT test was done today where the NuStep was used  Ms Tigist Rosa was not able to ambulate well  Her baseline Sa02 on 4lpm resting was 96% and HR 78 BPM with SOB 3/10  She did have to rest for about 30 seconds during minute 3   Ms ;s lowest Sao2 was 88% with peak HR 90 BPM and SOB 5/10 with REP 2/10  Ms Tigist Rosa has been able to exercise 30 per exercise session by working to increase her time  Her baseline Sa02 on 4 lpm is 91%, HR 87 bpm, /70,   Her HR peaks during exercise in the mid 90's with SOB 3/10 and RPE 4/10  Ms Tigist Rosa  is only able to do the Nustep at this point do to physical limitations and pain  We will work to increase her intensity and duration on the Nustep and try to  Work up to being able to use other pieces of equipment   Ms Tigist Rosa has attended a few formal education session during class and seems to be benefiting from them  Ms Amado Payan has agreed to continue pulmonary rehab and will attend 2x/ week  We will monitor per protocol and report progress accordingly  Medication compliance: Yes   Comments: Pt reports to be compliant with medications  Fall Risk: Moderate   Comments: Patient uses walking assist device (walker/cane/rollator)    Smoking: Former user    RPD at Rest: 3/10  RPD with Exercise:  5/10    Assessment of progression of lung disease and functional status:  CAT:   Shortness of breath questionnaire: 91/120      EXERCISE ASSESSMENT and PLAN    Current Exercise Program in Rehab:       Frequency: 2 days/week        Minutes: 30         METS: 2 0              SpO2: greater than 90%         RPD: 3-5                      HR: no more than 30 bpm greater than resting HR   RPE: 4-5         Modalities: NuStep      Exercise Progression 30 Day Goals :    Frequency: 2 days/week        Minutes: 35         METS: 2 0-2 4              SpO2: greater than 90%              RPD: 3-5                      HR: no more than 30 BPM greater than resting HR   RPE: 4-5        Modalities: UBE, NuStep and Room walking     Strength trainin-3 days / week  12-15 repetitions  1-2 sets per modality   will add in per patient tolerance    Currenlty she is unable to progress to arms and upper body due to reported pain    Modalities: Lateral Raise, Arm Curl, Front Raises and Shoulder Shrugs    Oxygen Needs: supplemental O2 via nasal cannula @ 4L/min at rest  Oxygen Goal: Maintain SpO2>90% during exercise    Home Exercise: none  Education: pursed lipped breathing, diaphragmatic breathing, fall risk using nasal canula tubing, relaxation breathing, home exercise, benefits of exercise for pulmonary disease and RPE scale    Goals: reduced score on  USCD Shortness of Breath Questionnaire, Improved 6MW distance by 10%, reduced dyspnea during exercise (0-3/10), improved exercise tolerance (max METs tolerated in pulmonary rehab), SpO2 >90% during exercise, improved DUKE activity score, reduced score on CAT, reduced number of COPD exacerbations, reduced RPD at rest, attend pulmonary rehab regularly and decrease sitting time at home  Progressing:  Reviewed Pt goals and determined plan of care    Plan: Titrate supplemental oxygen as needed to maintain SpO2>90% with exercise    Readiness to change: Pre-Contemplation:   (Not yet acknowledging that there is a problem behavior that needs to change)      NUTRITION ASSESSMENT AND PLAN    Weight control:    Starting weight: 290   Current weight:   290 (per patient )     Diabetes: type 2 daibetic- patient did not knw sugar number today    Goals:choose lean meat (93-95%), eliminate processed meats, reduce portion sizes of meat to 3oz or less, increase intake of fish, shellfish, cook without added fat or use vegetable oil/spray, increase intake of meatless meals, use low fat dairy, reduce cheese intake or use reduced-fat, eat 3 or more servings of whole grains a day, Eat 4-5 cups of fruits and vegetables daily, use olive or canola oil in baking, choose low sodium processed foods, eliminate butter, use fat-free dressings/degroot or seldom use, choose healthy snacks: light popcorn, plain pretzels, Increase intake of nuts and seeds and seldom eat or choose low fat ice-cream, fruit juice bars or frozen yogurt   Education: heart healthy eating  hydration  Progressing:Reviewed Pt goals and determined plan of care  Plan: switch to low fat cheeses, replace butter with soft spreads made with olive oil, canola or yogurt, replace refined grain bread with whole grain bread, replace unhealthy snacks with fruits & vegs, reduce portion sizes, reduce red meat 1x/wk, switch to skim or 1% milk, eat fewer desserts and sweets, avoid processed foods, remove salt shaker from table, use salt substitute like Mrs   Dash, increase utilization of fresh or dried herbs, eat more home cooked meals and eat out less often, will try new grains like brown rice, quinoa, farro, will replace sugar sweetened cereals with whole grain or oatmeal, monitor home blood glucose, reduce alcohol intake, drink more water and learn how to read food labels  Readiness to change: Pre-Contemplation:   (Not yet acknowledging that there is a problem behavior that needs to change)      PSYCHOSOCIAL ASSESSMENT AND PLAN    Emotional:  Depression assessment:  PHQ-9 = 15-19 = Moderately Severe Depression            Anxiety measure:  HALLE-7 = 0-4  = Not anxious  Self-reported stress level: 5   Social support: Very Good    Goals:  improved [facility] QOL < 27, PHQ-9 - reduced severity by one level, Physical Fitness in [facility] Score < 3, Daily Activity in [facility] Score < 3, Social Activities in [facility] Score < 3 and Overall Health in [facility] Score < 3  Education: signs/sxs of depression    Progressing:Reviewed Pt goals and determined plan of care  Plan: PHQ-9 >5 will refer to MD and Refer to [employer] to change: Preparation:  (Getting ready to change)       OTHER CORE COMPONENTS     Tobacco:   Social History     Tobacco Use   Smoking Status Former Smoker    Packs/day: 0 25    Years: 29 00    Pack years: 7 25    Types: Cigarettes    Start date: 200    Quit date: 12/10/2019    Years since quittin 0   Smokeless Tobacco Never Used       Tobacco Use Intervention: Referral to tobacco expert:   N/A: Pt has a remote history of smoking    Blood pressure:    Restin/70   Exercise: 132/82    Goals: consistent BP < 130/80, reduced dietary sodium <2300mg, moderate intensity exercise >150 mins/wk and medication compliance  Education:  pathophysiology of pulmonary disease, preventing infections, relapse education, control coughing, inspiratory muscle training, environmental triggers, nebulizer use, bronchodilators, bronchial hygiene and traveling with pulmonary disease  Progressing:Reviewed Pt goals and determined plan of care  Plan: avoid places with second hand smoke, Avoid Processed foods, engage in regular exercise, eliminate salt shaker at the table, use salt substitutes, check labels for sodium content and monitor home BP  Readiness to change: Preparation:  (Getting ready to change)

## 2022-01-07 ENCOUNTER — RA CDI HCC (OUTPATIENT)
Dept: OTHER | Facility: HOSPITAL | Age: 62
End: 2022-01-07

## 2022-01-07 NOTE — PROGRESS NOTES
[facility]  coding opportunities          Number of diagnosis code(s) already on the problem list added to FYI fla                     Patients insurance company: [employer] (Medicare Advantage and Commercial)     Visit status: Patient canceled the appointment        [facility]  coding opportunities          Number of diagnosis code(s) already on the problem list added to [employer] fla                     Patients insurance company: [employer] ([employer] and [employer])           With the beginning of the new year, this is a reminder to address ALL HCC (risk adjustment) codes for  as patient scores reset to zero SHADY   (14)

## 2022-01-10 ENCOUNTER — APPOINTMENT (OUTPATIENT)
Dept: PULMONOLOGY | Facility: HOSPITAL | Age: 62
End: 2022-01-10
Payer: COMMERCIAL

## 2022-01-12 ENCOUNTER — APPOINTMENT (OUTPATIENT)
Dept: PULMONOLOGY | Facility: HOSPITAL | Age: 62
End: 2022-01-12
Payer: COMMERCIAL

## 2022-01-13 ENCOUNTER — TELEPHONE (OUTPATIENT)
Dept: FAMILY MEDICINE CLINIC | Facility: HOSPITAL | Age: 62
End: 2022-01-13

## 2022-01-13 NOTE — TELEPHONE ENCOUNTER
Patient called  She is very depressed  Her adult disabled  daughter  at age 44 on Saturday night  She wanted Dr Malika Ndiaye to know she is taking some extra Lorazepam due to being so upset  It was not covid, presumed heart attack

## 2022-01-13 NOTE — TELEPHONE ENCOUNTER
Pt Just wanted to let you know that she actually did start taking another Lorazepam per day, equaling total of 2mg per day  I told her that is fine per your instructions until she gets over this hump, but not to exceed 2mg each day

## 2022-01-17 ENCOUNTER — APPOINTMENT (OUTPATIENT)
Dept: PULMONOLOGY | Facility: HOSPITAL | Age: 62
End: 2022-01-17
Payer: COMMERCIAL

## 2022-01-18 DIAGNOSIS — G47.00 INSOMNIA, UNSPECIFIED TYPE: ICD-10-CM

## 2022-01-18 DIAGNOSIS — I48.91 ATRIAL FIBRILLATION, UNSPECIFIED TYPE (HCC): ICD-10-CM

## 2022-01-18 RX ORDER — APIXABAN 5 MG/1
TABLET, FILM COATED ORAL
Qty: 180 TABLET | Refills: 1 | Status: SHIPPED | OUTPATIENT
Start: 2022-01-18 | End: 2022-06-21

## 2022-01-18 RX ORDER — TRAZODONE HYDROCHLORIDE 150 MG/1
TABLET ORAL
Qty: 90 TABLET | Refills: 0 | Status: SHIPPED | OUTPATIENT
Start: 2022-01-18 | End: 2022-01-31

## 2022-01-24 ENCOUNTER — APPOINTMENT (OUTPATIENT)
Dept: PULMONOLOGY | Facility: HOSPITAL | Age: 62
End: 2022-01-24
Payer: COMMERCIAL

## 2022-01-25 ENCOUNTER — TELEPHONE (OUTPATIENT)
Dept: FAMILY MEDICINE CLINIC | Facility: HOSPITAL | Age: 62
End: 2022-01-25

## 2022-01-25 DIAGNOSIS — Z79.899 ENCOUNTER FOR LONG-TERM (CURRENT) DRUG USE: ICD-10-CM

## 2022-01-25 NOTE — TELEPHONE ENCOUNTER
MAUREEN NEEDS A REFILL ON HER LORAZEPAM 0 5MG   [facility]

## 2022-01-26 ENCOUNTER — APPOINTMENT (OUTPATIENT)
Dept: PULMONOLOGY | Facility: HOSPITAL | Age: 62
End: 2022-01-26
Payer: COMMERCIAL

## 2022-01-26 ENCOUNTER — CLINICAL SUPPORT (OUTPATIENT)
Dept: PULMONOLOGY | Facility: HOSPITAL | Age: 62
End: 2022-01-26
Payer: COMMERCIAL

## 2022-01-26 DIAGNOSIS — J44.9 CHRONIC OBSTRUCTIVE PULMONARY DISEASE, UNSPECIFIED COPD TYPE (HCC): Primary | ICD-10-CM

## 2022-01-26 PROCEDURE — 94625 PHY/QHP OP PULM RHB W/O MNTR: CPT

## 2022-01-26 RX ORDER — LORAZEPAM 0.5 MG/1
TABLET ORAL
Qty: 90 TABLET | Refills: 0 | Status: SHIPPED | OUTPATIENT
Start: 2022-01-26 | End: 2022-02-28 | Stop reason: SDUPTHER

## 2022-01-28 DIAGNOSIS — E66.9 DIABETES MELLITUS TYPE 2 IN OBESE (HCC): ICD-10-CM

## 2022-01-28 DIAGNOSIS — E11.69 DIABETES MELLITUS TYPE 2 IN OBESE (HCC): ICD-10-CM

## 2022-01-28 RX ORDER — ATORVASTATIN CALCIUM 80 MG/1
TABLET, FILM COATED ORAL
Qty: 90 TABLET | Refills: 1 | Status: SHIPPED | OUTPATIENT
Start: 2022-01-28

## 2022-01-31 ENCOUNTER — APPOINTMENT (OUTPATIENT)
Dept: PULMONOLOGY | Facility: HOSPITAL | Age: 62
End: 2022-01-31
Payer: COMMERCIAL

## 2022-01-31 DIAGNOSIS — E11.22 TYPE 2 DIABETES MELLITUS WITH STAGE 2 CHRONIC KIDNEY DISEASE, WITHOUT LONG-TERM CURRENT USE OF INSULIN (HCC): ICD-10-CM

## 2022-01-31 DIAGNOSIS — I10 ESSENTIAL HYPERTENSION: ICD-10-CM

## 2022-01-31 DIAGNOSIS — E11.69 DIABETES MELLITUS TYPE 2 IN OBESE (HCC): ICD-10-CM

## 2022-01-31 DIAGNOSIS — J44.9 ASTHMA-COPD OVERLAP SYNDROME (HCC): ICD-10-CM

## 2022-01-31 DIAGNOSIS — F32.1 CURRENT MODERATE EPISODE OF MAJOR DEPRESSIVE DISORDER WITHOUT PRIOR EPISODE (HCC): ICD-10-CM

## 2022-01-31 DIAGNOSIS — D50.8 OTHER IRON DEFICIENCY ANEMIA: ICD-10-CM

## 2022-01-31 DIAGNOSIS — E66.9 DIABETES MELLITUS TYPE 2 IN OBESE (HCC): ICD-10-CM

## 2022-01-31 DIAGNOSIS — G47.00 INSOMNIA, UNSPECIFIED TYPE: ICD-10-CM

## 2022-01-31 DIAGNOSIS — N18.2 TYPE 2 DIABETES MELLITUS WITH STAGE 2 CHRONIC KIDNEY DISEASE, WITHOUT LONG-TERM CURRENT USE OF INSULIN (HCC): ICD-10-CM

## 2022-01-31 RX ORDER — ATORVASTATIN CALCIUM 40 MG/1
TABLET, FILM COATED ORAL
Qty: 30 TABLET | Refills: 5 | Status: SHIPPED | OUTPATIENT
Start: 2022-01-31 | End: 2022-06-21

## 2022-01-31 RX ORDER — TRAZODONE HYDROCHLORIDE 150 MG/1
TABLET ORAL
Qty: 90 TABLET | Refills: 0 | Status: SHIPPED | OUTPATIENT
Start: 2022-01-31 | End: 2022-05-06

## 2022-01-31 RX ORDER — FLUTICASONE PROPIONATE 220 UG/1
AEROSOL, METERED RESPIRATORY (INHALATION)
Qty: 12 G | Refills: 6 | Status: SHIPPED | OUTPATIENT
Start: 2022-01-31

## 2022-01-31 RX ORDER — ESCITALOPRAM OXALATE 10 MG/1
TABLET ORAL
Qty: 30 TABLET | Refills: 1 | Status: SHIPPED | OUTPATIENT
Start: 2022-01-31 | End: 2022-03-10 | Stop reason: SDUPTHER

## 2022-01-31 RX ORDER — METOPROLOL SUCCINATE 50 MG/1
TABLET, EXTENDED RELEASE ORAL
Qty: 90 TABLET | Refills: 1 | Status: SHIPPED | OUTPATIENT
Start: 2022-01-31 | End: 2022-05-23 | Stop reason: SDUPTHER

## 2022-02-01 RX ORDER — FERROUS SULFATE 220 (44)/5
220 ELIXIR ORAL
Qty: 473 ML | Refills: 2 | Status: SHIPPED | OUTPATIENT
Start: 2022-02-01 | End: 2022-03-03

## 2022-02-02 RX ORDER — INSULIN GLARGINE 100 [IU]/ML
25 INJECTION, SOLUTION SUBCUTANEOUS
Qty: 15 ML | Refills: 1 | Status: SHIPPED | OUTPATIENT
Start: 2022-02-02 | End: 2022-02-03 | Stop reason: SDUPTHER

## 2022-02-03 DIAGNOSIS — E11.22 TYPE 2 DIABETES MELLITUS WITH STAGE 2 CHRONIC KIDNEY DISEASE, WITH LONG-TERM CURRENT USE OF INSULIN (HCC): Primary | ICD-10-CM

## 2022-02-03 DIAGNOSIS — N18.2 TYPE 2 DIABETES MELLITUS WITH STAGE 2 CHRONIC KIDNEY DISEASE, WITH LONG-TERM CURRENT USE OF INSULIN (HCC): Primary | ICD-10-CM

## 2022-02-03 DIAGNOSIS — E11.22 TYPE 2 DIABETES MELLITUS WITH STAGE 2 CHRONIC KIDNEY DISEASE, WITHOUT LONG-TERM CURRENT USE OF INSULIN (HCC): ICD-10-CM

## 2022-02-03 DIAGNOSIS — Z79.4 TYPE 2 DIABETES MELLITUS WITH STAGE 2 CHRONIC KIDNEY DISEASE, WITH LONG-TERM CURRENT USE OF INSULIN (HCC): Primary | ICD-10-CM

## 2022-02-03 DIAGNOSIS — B35.4 TINEA CORPORIS: ICD-10-CM

## 2022-02-03 DIAGNOSIS — N18.2 TYPE 2 DIABETES MELLITUS WITH STAGE 2 CHRONIC KIDNEY DISEASE, WITHOUT LONG-TERM CURRENT USE OF INSULIN (HCC): ICD-10-CM

## 2022-02-03 PROCEDURE — 3066F NEPHROPATHY DOC TX: CPT | Performed by: FAMILY MEDICINE

## 2022-02-03 RX ORDER — INSULIN LISPRO 100 [IU]/ML
12 INJECTION, SOLUTION INTRAVENOUS; SUBCUTANEOUS
Qty: 15 ML | Refills: 1 | Status: SHIPPED | OUTPATIENT
Start: 2022-02-03 | End: 2022-05-07

## 2022-02-03 RX ORDER — PEN NEEDLE, DIABETIC 32GX 5/32"
NEEDLE, DISPOSABLE MISCELLANEOUS
Qty: 400 EACH | Refills: 3 | Status: SHIPPED | OUTPATIENT
Start: 2022-02-03

## 2022-02-03 RX ORDER — INSULIN GLARGINE 100 [IU]/ML
28 INJECTION, SOLUTION SUBCUTANEOUS
Qty: 15 ML | Refills: 1 | Status: SHIPPED | OUTPATIENT
Start: 2022-02-03 | End: 2022-05-07

## 2022-02-05 DIAGNOSIS — I27.20 PULMONARY HYPERTENSION (HCC): ICD-10-CM

## 2022-02-05 DIAGNOSIS — I50.32 CHRONIC DIASTOLIC HEART FAILURE (HCC): ICD-10-CM

## 2022-02-05 DIAGNOSIS — I50.31 ACUTE DIASTOLIC CONGESTIVE HEART FAILURE (HCC): ICD-10-CM

## 2022-02-05 RX ORDER — POTASSIUM CHLORIDE 1500 MG/1
TABLET, EXTENDED RELEASE ORAL
Qty: 60 TABLET | Refills: 5 | Status: SHIPPED | OUTPATIENT
Start: 2022-02-05 | End: 2022-08-07

## 2022-02-05 RX ORDER — TORSEMIDE 20 MG/1
TABLET ORAL
Qty: 120 TABLET | Refills: 0 | Status: SHIPPED | OUTPATIENT
Start: 2022-02-05 | End: 2022-03-26

## 2022-02-07 ENCOUNTER — TELEPHONE (OUTPATIENT)
Dept: FAMILY MEDICINE CLINIC | Facility: HOSPITAL | Age: 62
End: 2022-02-07

## 2022-02-07 PROCEDURE — U0003 INFECTIOUS AGENT DETECTION BY NUCLEIC ACID (DNA OR RNA); SEVERE ACUTE RESPIRATORY SYNDROME CORONAVIRUS 2 (SARS-COV-2) (CORONAVIRUS DISEASE [COVID-19]), AMPLIFIED PROBE TECHNIQUE, MAKING USE OF HIGH THROUGHPUT TECHNOLOGIES AS DESCRIBED BY CMS-2020-01-R: HCPCS | Performed by: FAMILY MEDICINE

## 2022-02-07 PROCEDURE — U0005 INFEC AGEN DETEC AMPLI PROBE: HCPCS | Performed by: FAMILY MEDICINE

## 2022-02-07 NOTE — PROGRESS NOTES
Pulmonary Rehabilitation Plan of Care   60 Day Reassessment      Today's date: 2022   # of Exercise Sessions Completed: 5  Patient name: John Beltre      : 1960  Age: 59 y o  MRN: [id number]  Referring Physician: Carolina Thompson*  Pulmonologist: Alisha Juarez MD  Provider: Elis  Clinician: Gayathri Kennedy, CRT    Dx:   Encounter Diagnosis   Name Primary?  Chronic obstructive pulmonary disease, unspecified COPD type ([facility] ) Yes     Date of onset: 2021      SUMMARY OF PROGRESS:  Ms Lesley Guy is being evaluated  for 60 days progress in  pulmonary rehab  To date she has completed 5 exercise sessions Mrs Lesley Guy  recently has suffered the death of her daughter and has been missing most exercise sessions as a result  There is no progress to report since the last note written  I did receive a call today from patient that she has been exposed to COVD 19 and will be tested as a result  She wikll not return to class without negative results  Mrs Lesley Guy was  referred with a  diagnosis of COPD/ Asthma overlap  Ms Lesley Guy wears 4 lpm oxygen  and also CPAP HS for WILMA  She has a history of Type 2 Daibetes but does not take daily sugars  Ms Lesley Guy also has a history of A fib, pulmonary hypertension and CHF  She reports some issues with her knees and back as well  Ms Joy's  goals for entering the program are to be able to walk to the mailbox and back which involves and incline as well as to go to play pool with friends  She also hopes to get into better shape and increase endurance  An ETT test was done today where the NuStep was used  Ms Lesley Guy was not able to ambulate well  Her baseline Sa02 on 4lpm resting was 96% and HR 78 BPM with SOB 3/10  She did have to rest for about 30 seconds during minute 3   Ms Joy;s lowest Sao2 was 88% with peak HR 90 BPM and SOB 5/10 with REP 2/10  Ms Lesley Guy has been able to exercise 30 per exercise session by working to increase her time   Her baseline Sa02 on 4 lpm is 91%, HR 87 bpm, /70,   Mrs Stuart Solorio peaks during exercise in the mid 90's with SOB 3/10 and RPE 4/10  Ms Jag Alicea  is only able to do the Nustep at this point do to physical limitations and pain  We will work to increase her intensity and duration on the Nustep and try to work up to being able to use other pieces of equipment  Ms Jag Alicea has attended a few formal education session during class and seems to be benefiting from them  It is my hope that Mrs Jag Alicea will begin to attend class regularly so we can see her progress and benefit from pulmonary rehab  Ms Jag Alicea has agreed to continue pulmonary rehab and will attend 2x/ week  We will monitor per protocol and report progress accordingly  Medication compliance: Yes   Comments: Pt reports to be compliant with medications  Fall Risk: Moderate   Comments: Patient uses walking assist device (walker/cane/rollator)    Smoking: Former user    RPD at Rest: 3/10  RPD with Exercise:  5/10    Assessment of progression of lung disease and functional status:  CAT: 24/40  Shortness of breath questionnaire: 91/120      EXERCISE ASSESSMENT and PLAN    Current Exercise Program in Rehab:       Frequency: 2 days/week        Minutes: 30         METS: 2 0              SpO2: greater than 90%         RPD: 3-5                      HR: no more than 30 bpm greater than resting HR   RPE: 4-5         Modalities: NuStep      Exercise Progression 30 Day Goals :    Frequency: 2 days/week        Minutes: 35         METS: 2 0-2 4              SpO2: greater than 90%              RPD: 3-5                      HR: no more than 30 BPM greater than resting HR   RPE: 4-5        Modalities: UBE and NuStep     Strength training:  will add in per patient tolerance    Currenlty she is unable to progress to arms and upper body due to reported pain    Modalities: Lateral Raise, Arm Curl, Front Raises and Shoulder Shrugs    Oxygen Needs: supplemental O2 via nasal cannula @ 4L/min at rest  Oxygen Goal: Maintain SpO2>90% during exercise    Home Exercise: none  Education: pursed lipped breathing, diaphragmatic breathing, fall risk using nasal canula tubing, relaxation breathing, home exercise, benefits of exercise for pulmonary disease and RPE scale    Goals: reduced score on  USCD Shortness of Breath Questionnaire, Improved 6MW distance by 10%, reduced dyspnea during exercise (0-3/10), improved exercise tolerance (max METs tolerated in pulmonary rehab), SpO2 >90% during exercise, improved DUKE activity score, reduced score on CAT, reduced number of COPD exacerbations, reduced RPD at rest, attend pulmonary rehab regularly and decrease sitting time at home  Progressing:  Reviewed Pt goals and determined plan of care    Plan: Titrate supplemental oxygen as needed to maintain SpO2>90% with exercise    Readiness to change: Pre-Contemplation:   (Not yet acknowledging that there is a problem behavior that needs to change)      NUTRITION ASSESSMENT AND PLAN    Weight control:    Starting weight: 290   Current weight:   290 (per patient )     Diabetes: type 2 daibetic- patient did not knw sugar number today    Goals:choose lean meat (93-95%), eliminate processed meats, reduce portion sizes of meat to 3oz or less, increase intake of fish, shellfish, cook without added fat or use vegetable oil/spray, increase intake of meatless meals, use low fat dairy, reduce cheese intake or use reduced-fat, eat 3 or more servings of whole grains a day, Eat 4-5 cups of fruits and vegetables daily, use olive or canola oil in baking, choose low sodium processed foods, eliminate butter, use fat-free dressings/degroot or seldom use, choose healthy snacks: light popcorn, plain pretzels, Increase intake of nuts and seeds and seldom eat or choose low fat ice-cream, fruit juice bars or frozen yogurt   Education: heart healthy eating  hydration  Progressing:Reviewed Pt goals and determined plan of care  Plan: switch to low fat cheeses, replace butter with soft spreads made with olive oil, canola or yogurt, replace refined grain bread with whole grain bread, replace unhealthy snacks with fruits & vegs, reduce portion sizes, reduce red meat 1x/wk, switch to skim or 1% milk, eat fewer desserts and sweets, avoid processed foods, remove salt shaker from table, use salt substitute like Mrs   Dash, increase utilization of fresh or dried herbs, eat more home cooked meals and eat out less often, will try new grains like brown rice, quinoa, farro, will replace sugar sweetened cereals with whole grain or oatmeal, monitor home blood glucose, reduce alcohol intake, drink more water and learn how to read food labels  Readiness to change: Pre-Contemplation:   (Not yet acknowledging that there is a problem behavior that needs to change)      PSYCHOSOCIAL ASSESSMENT AND PLAN    Emotional:  Depression assessment:  PHQ-9 = 15-19 = Moderately Severe Depression            Anxiety measure:  HALLE-7 = 0-4  = Not anxious  Self-reported stress level: 5   Social support: Very Good    Goals:  improved [facility] QOL < 27, PHQ-9 - reduced severity by one level, Physical Fitness in [facility] Score < 3, Daily Activity in [facility] Score < 3, Social Activities in [facility] Score < 3 and Overall Health in [facility] Score < 3  Education: signs/sxs of depression    Progressing:Reviewed Pt goals and determined plan of care  Plan: PHQ-9 >5 will refer to MD and Refer to [employer] to change: Preparation:  (Getting ready to change)       OTHER CORE COMPONENTS     Tobacco:   Social History     Tobacco Use   Smoking Status Former Smoker    Packs/day: 0 25    Years: 29 00    Pack years: 7 25    Types: Cigarettes    Start date: 200    Quit date: 12/10/2019    Years since quittin 1   Smokeless Tobacco Never Used       Tobacco Use Intervention: Referral to tobacco expert:   N/A: Pt has a remote history of smoking    Blood pressure:    Resting: 132/70   Exercise: 132/82    Goals: consistent BP < 130/80, reduced dietary sodium <2300mg, moderate intensity exercise >150 mins/wk and medication compliance  Education:  pathophysiology of pulmonary disease, preventing infections, relapse education, control coughing, inspiratory muscle training, environmental triggers, nebulizer use, bronchodilators, bronchial hygiene and traveling with pulmonary disease  Progressing:Reviewed Pt goals and determined plan of care  Plan: avoid places with second hand smoke, Avoid Processed foods, engage in regular exercise, eliminate salt shaker at the table, use salt substitutes, check labels for sodium content and monitor home BP  Readiness to change: Preparation:  (Getting ready to change)

## 2022-02-07 NOTE — TELEPHONE ENCOUNTER
Calling with symtom complaints of cough and sore throat, began Friday night 2/4  Exposed unknowingly to covid Thursday 2/3  Asking if she should be tested?   PCB

## 2022-02-10 ENCOUNTER — OFFICE VISIT (OUTPATIENT)
Dept: FAMILY MEDICINE CLINIC | Facility: HOSPITAL | Age: 62
End: 2022-02-10
Payer: COMMERCIAL

## 2022-02-10 ENCOUNTER — APPOINTMENT (OUTPATIENT)
Dept: LAB | Facility: HOSPITAL | Age: 62
End: 2022-02-10
Payer: COMMERCIAL

## 2022-02-10 VITALS
TEMPERATURE: 97.7 F | HEART RATE: 85 BPM | DIASTOLIC BLOOD PRESSURE: 78 MMHG | SYSTOLIC BLOOD PRESSURE: 128 MMHG | OXYGEN SATURATION: 90 % | WEIGHT: 288 LBS | BODY MASS INDEX: 51.03 KG/M2 | HEIGHT: 63 IN

## 2022-02-10 DIAGNOSIS — R53.83 FATIGUE, UNSPECIFIED TYPE: ICD-10-CM

## 2022-02-10 DIAGNOSIS — Z79.4 TYPE 2 DIABETES MELLITUS WITH HYPERGLYCEMIA, WITH LONG-TERM CURRENT USE OF INSULIN (HCC): ICD-10-CM

## 2022-02-10 DIAGNOSIS — E11.65 TYPE 2 DIABETES MELLITUS WITH HYPERGLYCEMIA, WITH LONG-TERM CURRENT USE OF INSULIN (HCC): ICD-10-CM

## 2022-02-10 DIAGNOSIS — I48.0 PAROXYSMAL ATRIAL FIBRILLATION (HCC): ICD-10-CM

## 2022-02-10 DIAGNOSIS — F32.1 CURRENT MODERATE EPISODE OF MAJOR DEPRESSIVE DISORDER WITHOUT PRIOR EPISODE (HCC): Primary | ICD-10-CM

## 2022-02-10 DIAGNOSIS — I50.32 CHRONIC DIASTOLIC CONGESTIVE HEART FAILURE (HCC): ICD-10-CM

## 2022-02-10 DIAGNOSIS — I13.0 HYPERTENSIVE HEART AND CHRONIC KIDNEY DISEASE WITH HEART FAILURE AND STAGE 1 THROUGH STAGE 4 CHRONIC KIDNEY DISEASE, OR CHRONIC KIDNEY DISEASE (HCC): ICD-10-CM

## 2022-02-10 DIAGNOSIS — R39.11 URINARY HESITANCY: ICD-10-CM

## 2022-02-10 PROBLEM — K57.92 ACUTE DIVERTICULITIS: Status: RESOLVED | Noted: 2021-05-02 | Resolved: 2022-02-10

## 2022-02-10 LAB
ALBUMIN SERPL BCP-MCNC: 3.6 G/DL (ref 3.5–5)
ALP SERPL-CCNC: 139 U/L (ref 46–116)
ALT SERPL W P-5'-P-CCNC: 32 U/L (ref 12–78)
ANION GAP SERPL CALCULATED.3IONS-SCNC: 2 MMOL/L (ref 4–13)
AST SERPL W P-5'-P-CCNC: 16 U/L (ref 5–45)
BILIRUB SERPL-MCNC: 0.5 MG/DL (ref 0.2–1)
BUN SERPL-MCNC: 10 MG/DL (ref 5–25)
CALCIUM SERPL-MCNC: 8.9 MG/DL (ref 8.3–10.1)
CHLORIDE SERPL-SCNC: 92 MMOL/L (ref 100–108)
CO2 SERPL-SCNC: 39 MMOL/L (ref 21–32)
CREAT SERPL-MCNC: 0.76 MG/DL (ref 0.6–1.3)
ERYTHROCYTE [DISTWIDTH] IN BLOOD BY AUTOMATED COUNT: 15.4 % (ref 11.6–15.1)
GFR SERPL CREATININE-BSD FRML MDRD: 83 ML/MIN/1.73SQ M
GLUCOSE P FAST SERPL-MCNC: 373 MG/DL (ref 65–99)
HCT VFR BLD AUTO: 41.6 % (ref 34.8–46.1)
HGB BLD-MCNC: 12 G/DL (ref 11.5–15.4)
MAGNESIUM SERPL-MCNC: 2 MG/DL (ref 1.6–2.6)
MCH RBC QN AUTO: 23 PG (ref 26.8–34.3)
MCHC RBC AUTO-ENTMCNC: 28.8 G/DL (ref 31.4–37.4)
MCV RBC AUTO: 80 FL (ref 82–98)
PLATELET # BLD AUTO: 319 THOUSANDS/UL (ref 149–390)
PMV BLD AUTO: 11.4 FL (ref 8.9–12.7)
POTASSIUM SERPL-SCNC: 3.9 MMOL/L (ref 3.5–5.3)
PROT SERPL-MCNC: 7.4 G/DL (ref 6.4–8.2)
RBC # BLD AUTO: 5.21 MILLION/UL (ref 3.81–5.12)
SL AMB  POCT GLUCOSE, UA: NORMAL
SL AMB LEUKOCYTE ESTERASE,UA: NEGATIVE
SL AMB POCT BILIRUBIN,UA: NEGATIVE
SL AMB POCT BLOOD,UA: NEGATIVE
SL AMB POCT CLARITY,UA: CLEAR
SL AMB POCT COLOR,UA: YELLOW
SL AMB POCT KETONES,UA: NEGATIVE
SL AMB POCT NITRITE,UA: NEGATIVE
SL AMB POCT PH,UA: 6
SL AMB POCT SPECIFIC GRAVITY,UA: 1.01
SL AMB POCT URINE PROTEIN: NEGATIVE
SL AMB POCT UROBILINOGEN: NEGATIVE
SODIUM SERPL-SCNC: 133 MMOL/L (ref 136–145)
WBC # BLD AUTO: 11 THOUSAND/UL (ref 4.31–10.16)

## 2022-02-10 PROCEDURE — 81002 URINALYSIS NONAUTO W/O SCOPE: CPT | Performed by: FAMILY MEDICINE

## 2022-02-10 PROCEDURE — 1036F TOBACCO NON-USER: CPT | Performed by: FAMILY MEDICINE

## 2022-02-10 PROCEDURE — 36415 COLL VENOUS BLD VENIPUNCTURE: CPT | Performed by: FAMILY MEDICINE

## 2022-02-10 PROCEDURE — 3008F BODY MASS INDEX DOCD: CPT | Performed by: FAMILY MEDICINE

## 2022-02-10 PROCEDURE — 85027 COMPLETE CBC AUTOMATED: CPT | Performed by: FAMILY MEDICINE

## 2022-02-10 PROCEDURE — 83735 ASSAY OF MAGNESIUM: CPT | Performed by: FAMILY MEDICINE

## 2022-02-10 PROCEDURE — 99214 OFFICE O/P EST MOD 30 MIN: CPT | Performed by: FAMILY MEDICINE

## 2022-02-10 PROCEDURE — 80053 COMPREHEN METABOLIC PANEL: CPT | Performed by: FAMILY MEDICINE

## 2022-02-10 PROCEDURE — 3061F NEG MICROALBUMINURIA REV: CPT | Performed by: FAMILY MEDICINE

## 2022-02-10 NOTE — PROGRESS NOTES
Assessment/Plan:      Problem List Items Addressed This Visit        Endocrine    Type 2 diabetes mellitus with hyperglycemia (HCC)       Cardiovascular and Mediastinum    Chronic diastolic congestive heart failure (HCC)    Relevant Orders    CBC    Comprehensive metabolic panel    Magnesium    Hypertensive heart and chronic kidney disease with heart failure and stage 1 through stage 4 chronic kidney disease, or chronic kidney disease (HCC)    Paroxysmal atrial fibrillation (HCC)       Other    Current moderate episode of major depressive disorder without prior episode ([facility] ) - Primary      Other Visit Diagnoses     Urinary hesitancy        Relevant Orders    POCT urine dip (Completed)    CBC    Comprehensive metabolic panel    Magnesium    Fatigue, unspecified type               Plan/Discussion:  Patient is not feeling well  No sign of infection to include recent covid -19 test that was negative  UA is unremarkable  Concern more of grief with underlying MDD  She is on lexapro  Daughter  3 weeks ago  No si/hi  Continue with prn ativan  Does have chf and on diuretics  She is currently euvolemic  Check labs to make sure no electrolyte abnormality  Labs stat ordered  Diabetes with hyperglycemia  Continues to have high blood sugar readings  Working with Endo  On multi dose insulin regimen  Subjective:   Chief Complaint   Patient presents with    Follow-up        Patient ID: Lyudmila Hicks is a 59 y o  female  Pt with multiplechronic conditions  Has not been feeling well over the past few days  Known o2 dependence due to COPD/CHF  But has No fever, no chills  No change in breathing, no increase coughing, no increase sob  Covid testing negative  No dysuria, no frequency, with hesitancy during the day  Good fluid intake  Able to eat  Her blood sugars are in the 200-300 range  On multi dose insulin regimen     Ongoing managmenet through Endocrinology  Has had increased depression  On lexapro  Has been taking ativan a little more  Her daughter  3 weeks ago in a group home  She was 44years old  The following portions of the patient's history were reviewed and updated as appropriate: allergies, current medications, past family history, past medical history, past social history, past surgical history and problem list     Review of Systems   Constitutional: Positive for activity change and fatigue  Negative for appetite change, chills, diaphoresis and fever  HENT: Negative for congestion, facial swelling and sore throat  Respiratory: Negative  Negative for apnea, cough, chest tightness and shortness of breath  Cardiovascular: Negative  Negative for chest pain and palpitations  Gastrointestinal: Negative  Negative for abdominal distention, abdominal pain, blood in stool, constipation, diarrhea and nausea  Genitourinary: Negative  Negative for difficulty urinating, dysuria, flank pain and frequency  Neurological: Positive for weakness  Psychiatric/Behavioral: Positive for sleep disturbance  Negative for agitation, behavioral problems, confusion, hallucinations and suicidal ideas  The patient is not hyperactive  Objective:  Vitals:    02/10/22 1237   BP: 128/78   Pulse: 85   Temp: 97 7 °F (36 5 °C)   SpO2: 90%   Weight: 131 kg (288 lb)   Height: 5' 3" (1 6 m)     BP Readings from Last 6 Encounters:   02/10/22 128/78   11/15/21 124/62   21 140/98   10/18/21 148/58   10/08/21 122/80   21 169/70      Wt Readings from Last 6 Encounters:   02/10/22 131 kg (288 lb)   21 132 kg (290 lb)   11/15/21 132 kg (290 lb 9 6 oz)   21 132 kg (290 lb)   10/18/21 131 kg (288 lb 12 8 oz)   10/08/21 131 kg (288 lb 6 4 oz)             Physical Exam  Vitals and nursing note reviewed  Constitutional:       Appearance: Normal appearance  She is obese  She is ill-appearing     HENT:      Head: Normocephalic  Nose: Nose normal       Mouth/Throat:      Mouth: Mucous membranes are moist    Eyes:      Extraocular Movements: Extraocular movements intact  Pupils: Pupils are equal, round, and reactive to light  Cardiovascular:      Rate and Rhythm: Normal rate and regular rhythm  Heart sounds: Normal heart sounds  Pulmonary:      Effort: Pulmonary effort is normal       Breath sounds: Normal breath sounds  Abdominal:      General: Bowel sounds are normal       Palpations: Abdomen is soft  Musculoskeletal:      Cervical back: Neck supple  Skin:     Capillary Refill: Capillary refill takes less than 2 seconds  Neurological:      General: No focal deficit present  Mental Status: She is alert and oriented to person, place, and time  Psychiatric:         Attention and Perception: Attention normal          Mood and Affect: Mood is depressed  Speech: Speech normal          Behavior: Behavior normal          Thought Content:  Thought content normal          Judgment: Judgment normal

## 2022-02-28 DIAGNOSIS — Z79.899 ENCOUNTER FOR LONG-TERM (CURRENT) DRUG USE: ICD-10-CM

## 2022-02-28 RX ORDER — LORAZEPAM 0.5 MG/1
TABLET ORAL
Qty: 90 TABLET | Refills: 0 | Status: SHIPPED | OUTPATIENT
Start: 2022-02-28 | End: 2022-03-25 | Stop reason: SDUPTHER

## 2022-02-28 NOTE — PROGRESS NOTES
Helen Albright      Dear Dr Mariela Manuel    Thank you for referring your patient to our pulmonary rehabilitation program  The patient has completed 5  visits  However, rehab is being discontinued at this time due to:    Noncompliance: The patient has not regularly attended his/her rehab sessions  Last session attended was on 1/26/2022  No reply received from patient with phone call inquiring of absence  Please contact us at [phone] if you have any questions about this patents case  Thank you for your continued support of cardiac rehabilitation         Sincerely,      Rafi Daugherty, CRT

## 2022-03-10 ENCOUNTER — OFFICE VISIT (OUTPATIENT)
Dept: FAMILY MEDICINE CLINIC | Facility: HOSPITAL | Age: 62
End: 2022-03-10
Payer: COMMERCIAL

## 2022-03-10 VITALS
HEIGHT: 63 IN | SYSTOLIC BLOOD PRESSURE: 136 MMHG | HEART RATE: 86 BPM | OXYGEN SATURATION: 88 % | TEMPERATURE: 97.8 F | BODY MASS INDEX: 50.71 KG/M2 | DIASTOLIC BLOOD PRESSURE: 80 MMHG | WEIGHT: 286.2 LBS

## 2022-03-10 DIAGNOSIS — F32.1 CURRENT MODERATE EPISODE OF MAJOR DEPRESSIVE DISORDER WITHOUT PRIOR EPISODE (HCC): ICD-10-CM

## 2022-03-10 DIAGNOSIS — N18.2 TYPE 2 DIABETES MELLITUS WITH STAGE 2 CHRONIC KIDNEY DISEASE, WITH LONG-TERM CURRENT USE OF INSULIN (HCC): ICD-10-CM

## 2022-03-10 DIAGNOSIS — Z79.4 TYPE 2 DIABETES MELLITUS WITH STAGE 2 CHRONIC KIDNEY DISEASE, WITH LONG-TERM CURRENT USE OF INSULIN (HCC): ICD-10-CM

## 2022-03-10 DIAGNOSIS — Z79.4 TYPE 2 DIABETES MELLITUS WITH HYPERGLYCEMIA, WITH LONG-TERM CURRENT USE OF INSULIN (HCC): Primary | ICD-10-CM

## 2022-03-10 DIAGNOSIS — L02.416 ABSCESS OF LEFT THIGH: ICD-10-CM

## 2022-03-10 DIAGNOSIS — E11.22 TYPE 2 DIABETES MELLITUS WITH STAGE 2 CHRONIC KIDNEY DISEASE, WITH LONG-TERM CURRENT USE OF INSULIN (HCC): ICD-10-CM

## 2022-03-10 DIAGNOSIS — E11.65 TYPE 2 DIABETES MELLITUS WITH HYPERGLYCEMIA, WITH LONG-TERM CURRENT USE OF INSULIN (HCC): Primary | ICD-10-CM

## 2022-03-10 PROCEDURE — 3066F NEPHROPATHY DOC TX: CPT | Performed by: PHYSICIAN ASSISTANT

## 2022-03-10 PROCEDURE — 99214 OFFICE O/P EST MOD 30 MIN: CPT | Performed by: FAMILY MEDICINE

## 2022-03-10 RX ORDER — ESCITALOPRAM OXALATE 20 MG/1
20 TABLET ORAL DAILY
Qty: 90 TABLET | Refills: 0 | Status: SHIPPED | OUTPATIENT
Start: 2022-03-10 | End: 2022-05-06

## 2022-03-10 NOTE — PROGRESS NOTES
Assessment/Plan:      Problem List Items Addressed This Visit        Endocrine    Type 2 diabetes mellitus with hyperglycemia ([facility] [address] ) - Primary    Type 2 diabetes mellitus with stage 2 chronic kidney disease, with long-term current use of insulin (HCC)       Other    Current moderate episode of major depressive disorder without prior episode (HCC)    Relevant Medications    escitalopram (LEXAPRO) 20 mg tablet      Other Visit Diagnoses     Abscess of left thigh               Plan/Discussion:  Abscess left thigh  This has drained and resolving  No abx needed  Daily soap and water  MDD  Will increase lexapro to 20 mg daily  Monitor  Diabetes, type 2  Uncontrolled  Will continue fu with Endocrinology  Asthma/COPD  Stable but severe  o2 dependent  Max therapy  Will cotninue fu with Pulmonology  ? Transplant candidate  Subjective:   Chief Complaint   Patient presents with    Cyst     Inner left thigh         Patient ID: Toya West is a 59 y o  female  Pt here due to red mass on the left inner thigh  However this "popped" the other day, drained  And the mass is smaller  No pain  No fever, no chills  Reports ongoing fu with Endo but does need an apt regarding her diabetes  Remains uncontrolled but better  Copd-asthma  No exacerbations  Needing fu apt  Taking medications as advised  Does report ongoing depression  Depressed mood, no energy, no interest in doing things  No si/hi  The following portions of the patient's history were reviewed and updated as appropriate: allergies, current medications, past family history, past medical history, past social history, past surgical history and problem list     Review of Systems   Constitutional: Negative  Negative for activity change, appetite change, chills, diaphoresis, fatigue and fever  HENT: Negative for congestion, facial swelling and sore throat  Respiratory: Negative    Negative for apnea, cough, chest tightness and shortness of breath  Cardiovascular: Negative  Negative for chest pain and palpitations  Gastrointestinal: Negative  Negative for abdominal distention, abdominal pain, blood in stool, constipation, diarrhea and nausea  Genitourinary: Negative  Negative for difficulty urinating, dysuria, flank pain and frequency  Objective:  Vitals:    03/10/22 1128   BP: 136/80   Pulse: 86   Temp: 97 8 °F (36 6 °C)   SpO2: (!) 88%   Weight: 130 kg (286 lb 3 2 oz)   Height: 5' 3" (1 6 m)     BP Readings from Last 6 Encounters:   03/10/22 136/80   02/10/22 128/78   11/15/21 124/62   11/05/21 140/98   10/18/21 148/58   10/08/21 122/80      Wt Readings from Last 6 Encounters:   03/10/22 130 kg (286 lb 3 2 oz)   02/10/22 131 kg (288 lb)   11/23/21 132 kg (290 lb)   11/15/21 132 kg (290 lb 9 6 oz)   11/05/21 132 kg (290 lb)   10/18/21 131 kg (288 lb 12 8 oz)             Physical Exam  Vitals and nursing note reviewed  Constitutional:       Appearance: Normal appearance  HENT:      Head: Normocephalic  Nose: Nose normal       Mouth/Throat:      Mouth: Mucous membranes are moist    Eyes:      Extraocular Movements: Extraocular movements intact  Pupils: Pupils are equal, round, and reactive to light  Cardiovascular:      Rate and Rhythm: Normal rate and regular rhythm  Heart sounds: Normal heart sounds  Pulmonary:      Breath sounds: Normal breath sounds  Decreased air movement present  Skin:     Comments: Left inner thigh: mild erythema  Skin opening seen  No drainage, no induration, no fluctuance  Neurological:      Mental Status: She is alert

## 2022-03-18 DIAGNOSIS — E11.65 TYPE 2 DIABETES MELLITUS WITH HYPERGLYCEMIA, UNSPECIFIED WHETHER LONG TERM INSULIN USE (HCC): ICD-10-CM

## 2022-03-18 RX ORDER — BLOOD SUGAR DIAGNOSTIC
STRIP MISCELLANEOUS
Qty: 100 STRIP | Refills: 3 | Status: SHIPPED | OUTPATIENT
Start: 2022-03-18 | End: 2022-06-21

## 2022-03-23 ENCOUNTER — OFFICE VISIT (OUTPATIENT)
Dept: ENDOCRINOLOGY | Facility: HOSPITAL | Age: 62
End: 2022-03-23
Payer: COMMERCIAL

## 2022-03-23 VITALS
DIASTOLIC BLOOD PRESSURE: 80 MMHG | BODY MASS INDEX: 51.45 KG/M2 | SYSTOLIC BLOOD PRESSURE: 124 MMHG | WEIGHT: 290.4 LBS | HEART RATE: 96 BPM | HEIGHT: 63 IN

## 2022-03-23 DIAGNOSIS — N18.2 TYPE 2 DIABETES MELLITUS WITH STAGE 2 CHRONIC KIDNEY DISEASE, WITH LONG-TERM CURRENT USE OF INSULIN (HCC): Primary | ICD-10-CM

## 2022-03-23 DIAGNOSIS — E11.22 TYPE 2 DIABETES MELLITUS WITH STAGE 2 CHRONIC KIDNEY DISEASE, WITH LONG-TERM CURRENT USE OF INSULIN (HCC): Primary | ICD-10-CM

## 2022-03-23 DIAGNOSIS — Z79.4 TYPE 2 DIABETES MELLITUS WITH STAGE 2 CHRONIC KIDNEY DISEASE, WITH LONG-TERM CURRENT USE OF INSULIN (HCC): Primary | ICD-10-CM

## 2022-03-23 LAB — SL AMB POCT HEMOGLOBIN AIC: 11.8 (ref ?–6.5)

## 2022-03-23 PROCEDURE — 3046F HEMOGLOBIN A1C LEVEL >9.0%: CPT | Performed by: PHYSICIAN ASSISTANT

## 2022-03-23 PROCEDURE — 83036 HEMOGLOBIN GLYCOSYLATED A1C: CPT | Performed by: PHYSICIAN ASSISTANT

## 2022-03-23 PROCEDURE — 1036F TOBACCO NON-USER: CPT | Performed by: PHYSICIAN ASSISTANT

## 2022-03-23 PROCEDURE — 3008F BODY MASS INDEX DOCD: CPT | Performed by: PHYSICIAN ASSISTANT

## 2022-03-23 PROCEDURE — 99214 OFFICE O/P EST MOD 30 MIN: CPT | Performed by: PHYSICIAN ASSISTANT

## 2022-03-23 RX ORDER — DULAGLUTIDE 0.75 MG/.5ML
0.75 INJECTION, SOLUTION SUBCUTANEOUS WEEKLY
Qty: 2 ML | Refills: 5 | Status: SHIPPED | OUTPATIENT
Start: 2022-03-23

## 2022-03-23 NOTE — PROGRESS NOTES
Myla Scott 59 y o  female MRN: [id number]    Encounter: [id number]      Assessment/Plan     Assessment: This is a 59 y o  female with type 2 diabetes with neuropathy, chronic kidney disease, and hyperlipidemia  Plan:  1  Type 2 diabetes, insulin requiring:  Most recent hemoglobin A1c was 11 8  Partially due to inactivity and poor dietary habits as she has had some severe depression recently  At this time I will have her start Trulicity 9 19 mg weekly to see if this will help with glucose levels  Discussed side effects medication  If there is any concerns, please contact the office  No adjustments will be made to her insulin  She will continue with Lantus 28 units at bedtime, Humalog 12 units with each meal plus the addition of a sliding scale  She also continue with metformin 500 mg 2 tablets twice a day  Continue checking blood sugar 4 times a day and send in blood sugar logs in 2 weeks  Follow-up in 3 months with lab work completed prior to visit  Patient is type 2 diabetic currently utilizing for more insulin injections a day  She is checking her blood sugar for more times a day, and adjusting insulin doses according to current glucose levels  Because of this, think could beneficial for her to utilize a Dexcom continuous glucose monitoring system for her glucose levels  2  Diabetic neuropathy:  Stable  Diabetic foot exam is up-to-date  3  History of stage 2 chronic kidney disease:  Kidney function is normal   Some electrolyte abnormalities likely due to hyperglycemia with lab work and also her COPD  Repeat CMP prior to next office visit  4  Hyperlipidemia:  Most recent lipid panel looked excellent  Continue with current medication  Will continue monitor over time  CC:  Type 2 diabetes follow-up    History of Present Illness     HPI:  Myla Scott is a 59year old female with type 2 diabetes, insulin requiring for 7 year, hyperlipidemia for follow-up     She was placed on insulin in June 2021 when her hemoglobin A1c went up markedly  She admits to a poor diet  She can do very well at times but has "no discipline"  She has not seen Diabetes Education  She reportedly did try Januvia and Jardiance in the past but had problems with side effects on these medications  She is on oral agents and insulin at home and takes glimepiride 8 mg with breakfast, metformin  mg 2 tablets twice a day, and Lantus insulin 28 units at bedtime, Humalog 12 units with each meal   Humalog was started in between office visits  She admits to polyuria, polydipsia, polyphagia, nocturia 3-4 times a night, and blurry vision  She has unusual sensations of her feet with painful sensations of her feet  She denies chest pain but has shortness of breath with her lung disease  She is on oxygen 24 hours a day  She denies retinopathy, heart attack, stroke and claudication but does admit to neuropathy and nephropathy  States that her daughter passed away in January, and since then has been having some severe depression  Can find it difficult to get out of bed or do anything  Also finds herself he eating more frequently      Hypoglycemic episodes: No never  H/o of hypoglycemia causing hospitalization or intervention such as glucagon injection  or ambulance call  No   Hypoglycemia symptoms: never had one  Treatment of hypoglycemia: would eat something sweet or sugar  Glucagon:No   Medic alert tag: recommended,Yes       The patient's last eye exam was at least 2 years ago  The patient's last foot exam was in with podiatry, Dr Anastasiya Perry  She last saw Podiatry several weeks ago and does every 3 months  Last diabetic foot exam completed in the office was November 2021      Blood Sugar/Glucometer/Pump/CGM review:  Currently checking blood sugar 4 times a day  Blood sugars are typically ranging in the high 200s and into the 300s     She has hyperlipidemia and takes atorvastatin 40 mg daily    She denies chest pain but has rare palpitations  Review of Systems   Constitutional: Positive for appetite change and fatigue  Negative for activity change and unexpected weight change  HENT: Negative for sore throat and trouble swallowing  Eyes: Negative for visual disturbance  Respiratory: Positive for shortness of breath (On portable oxygen)  Negative for chest tightness  Cardiovascular: Negative for chest pain, palpitations and leg swelling  Gastrointestinal: Negative for abdominal pain, constipation, diarrhea, nausea and vomiting  Endocrine: Positive for polydipsia, polyphagia and polyuria  Negative for cold intolerance and heat intolerance  Genitourinary: Negative for frequency  Nocturia   Skin: Negative for wound  Neurological: Positive for numbness ( bilateral feet)  Negative for dizziness, weakness and headaches  Psychiatric/Behavioral: Positive for dysphoric mood  Negative for sleep disturbance  The patient is not nervous/anxious  Historical Information   Past Medical History:   Diagnosis Date    Acute diverticulitis 5/2/2021    Alcohol abuse 5/21/2020    Anemia     Atrial fibrillation (HCC)     Cervical radiculopathy     CHF (congestive heart failure) (HCC)     Chronic low back pain     Chronic obstructive asthma ([facility] [address] ) 2/20/2018    Cigarette nicotine dependence without complication 43/34/2488    Quit 12/10/2019       Community acquired pneumonia 5/21/2020    Depression with anxiety 3/9/2014    Diabetes mellitus with hyperglycemia (HCC)     Elevated liver enzymes     GERD (gastroesophageal reflux disease)     Hypersomnolence 10/28/2020    Hypokalemia 12/4/2018    Paresthesia of upper extremity     Plantar fasciitis     Restless legs syndrome 4/8/2014    Sexual dysfunction     Sleep apnea, obstructive     Tenosynovitis of finger     Tinea corporis     Tobacco use 2/20/2018    Currently smoking 3-4 cigarettes daily    Trigger middle finger of left hand 2017    Trigger ring finger of left hand 2017    Weakness of upper extremity      Past Surgical History:   Procedure Laterality Date    ABDOMINAL SURGERY      CARPAL TUNNEL RELEASE Bilateral      SECTION      CHOLECYSTECTOMY      laparoscopic    ESOPHAGOGASTRODUODENOSCOPY N/A 12/3/2018    Procedure: ESOPHAGOGASTRODUODENOSCOPY (EGD) AND COLONOSCOPY;  Surgeon: Sommer Krishnan MD;  Location: [facility];  Service: Gastroenterology    GASTRIC BYPASS      for morbid obesity with gilda en y   [address] PRIMARY GANGLION WRIST Left 2017    Procedure: LONG FINGER GANGLION CYST EXCISION;  Surgeon: Elis Hinds MD;  Location: [facility];  Service: Orthopedics    NE INCISE FINGER TENDON SHEATH Left 2017    Procedure: Gavin Sheridan, RING, TRIGGER FINGER RELEASE;  Surgeon: Elis Hinds MD;  Location: [facility];  Service: Orthopedics    SHOULDER SURGERY Right      Social History   Social History     Substance and Sexual Activity   Alcohol Use Not Currently    Alcohol/week: 20 0 standard drinks    Types: 20 Cans of beer per week    Comment: about 6 [employer] light every night, stopped in      Social History     Substance and Sexual Activity   Drug Use No     Social History     Tobacco Use   Smoking Status Former Smoker    Packs/day: 0 25    Years: 29 00    Pack years: 7 25    Types: Cigarettes    Start date: 200    Quit date: 12/10/2019    Years since quittin 2   Smokeless Tobacco Never Used     Family History:   Family History   Problem Relation Age of Onset    Alzheimer's disease Mother     Atrial fibrillation Mother     Dementia Mother     Heart disease Mother         heart problem    Seizures Mother     Parkinsonism Father     Arthritis Father     Atrial fibrillation Father     Colon cancer Maternal Grandmother [de-identified]    Diabetes type II Maternal Grandmother     No Known Problems Brother     No Known Problems Daughter  No Known Problems Son     Cri-du-chat syndrome Daughter     Substance Abuse Neg Hx         mother,father    Mental illness Neg Hx         mother,father    Colon polyps Neg Hx        Meds/Allergies   Current Outpatient Medications   Medication Sig Dispense Refill    albuterol (2 5 mg/3 mL) 0 083 % nebulizer solution Take 3 mL (2 5 mg total) by nebulization every 6 (six) hours as needed for wheezing or shortness of breath 1080 mL 3    albuterol (PROVENTIL HFA,VENTOLIN HFA) 90 mcg/act inhaler Inhale 2 puffs every 4 (four) hours as needed for wheezing 1 Inhaler 4    ascorbic acid (VITAMIN C) 1000 MG tablet Take 1,000 mg by mouth daily        atorvastatin (LIPITOR) 40 mg tablet TAKE 1 TABLET BY MOUTH EVERY DAY 30 tablet 5    Blood Glucose Monitoring Suppl (NABILA CONTOUR NEXT MONITOR) w/Device KIT by Does not apply route daily      Cholecalciferol 1000 units tablet Take 1,000 Units by mouth daily        Contour Next Test test strip USE TO TEST BLOOD SUGAR 3 TIMES A  strip 3    cyanocobalamin (VITAMIN B-12) 100 mcg tablet Take by mouth daily      Eliquis 5 MG TAKE 1 TABLET BY MOUTH TWICE A  tablet 1    escitalopram (LEXAPRO) 20 mg tablet Take 1 tablet (20 mg total) by mouth daily 90 tablet 0    flecainide (TAMBOCOR) 100 mg tablet Take 1 tablet (100 mg total) by mouth every 12 (twelve) hours 180 tablet 1    Flovent  MCG/ACT inhaler INHALE 2 PUFFS 2 (TWO) TIMES A DAY RINSE MOUTH AFTER USE  12 g 6    fluticasone (FLONASE) 50 mcg/act nasal spray 1 spray into each nostril 2 (two) times a day 1 Bottle 3    glycopyrrolate-formoterol (BEVESPI AEROSPHERE) 9-4 8 MCG/ACT inhaler Inhale 2 puffs 2 (two) times a day 10 7 g 5    insulin glargine (Lantus SoloStar) 100 units/mL injection pen Inject 28 Units under the skin daily at bedtime 15 mL 1    insulin lispro (HumaLOG KwikPen) 100 units/mL injection pen Inject 12 Units under the skin 3 (three) times a day with meals 15 mL 1    Insulin Pen Needle (BD Pen Needle Love 2nd Gen) 32G X 4 MM MISC Use daily at bedtime Use 4 new needles daily   400 each 3    Klor-Con M20 20 MEQ tablet TAKE 1 TABLET (20 MEQ TOTAL) BY MOUTH 2 (TWO) TIMES A DAY 60 tablet 5    levalbuterol (XOPENEX) 1 25 mg/0 5 mL nebulizer solution Take 0 5 mL (1 25 mg total) by nebulization 2 (two) times a day 60 vial 0    LifeScan Unistik II Lancets MISC Use 3 (three) times a day 100 each 5    loratadine (Claritin) 10 mg tablet Take by mouth      LORazepam (ATIVAN) 0 5 mg tablet TAKE 2 TABLETS BY MOUTH AT BEDTIME AND 1 EVERY 6 HOURS AS NEEDED FOR ANXIETY 90 tablet 0    Magnesium 400 MG CAPS Take by mouth 1 BID      metFORMIN (GLUCOPHAGE-XR) 500 mg 24 hr tablet Take 2 tablets (1,000 mg total) by mouth 2 (two) times a day with meals 120 tablet 5    metolazone (ZAROXOLYN) 5 mg tablet Take 1 tablet (5 mg total) by mouth daily 5 tablet 0    metoprolol succinate (TOPROL-XL) 50 mg 24 hr tablet TAKE 1 TABLET BY MOUTH EVERY DAY 90 tablet 1    montelukast (SINGULAIR) 10 mg tablet Take 1 tablet (10 mg total) by mouth daily at bedtime 90 tablet 3    naloxone (NARCAN) 4 mg/0 1 mL nasal spray Administer 1 spray into a nostril  If breathing does not return to normal or if breathing difficulty resumes after 2-3 minutes, give another dose in the other nostril using a new spray   1 each 1    omeprazole (PriLOSEC) 40 MG capsule Take 1 capsule (40 mg total) by mouth daily 90 capsule 3    polyethylene glycol (COLYTE) 4000 mL solution As directed 4000 mL 0    torsemide (DEMADEX) 20 mg tablet TAKE 2 TABLETS BY MOUTH TWICE A DAY (Patient taking differently: Take 60 mg by mouth 2 (two) times a day  ) 120 tablet 0    traZODone (DESYREL) 150 mg tablet TAKE 1 TABLET BY MOUTH EVERYDAY AT BEDTIME 90 tablet 0    atorvastatin (LIPITOR) 80 mg tablet TAKE 1/2 TABLET DAILY (Patient not taking: Reported on 3/23/2022 ) 90 tablet 1    Dulaglutide (Trulicity) 2 55 DJ/0 5RX SOPN Inject 0 5 mL (0 75 mg total) under the skin once a week 2 mL 5    ferrous sulfate 220 (44 Fe) mg/5 mL solution TAKE 5 ML (220 MG TOTAL) BY MOUTH 2 (TWO) TIMES A DAY BEFORE MEALS (Patient not taking: Reported on 3/23/2022 ) 473 mL 2    glimepiride (AMARYL) 4 mg tablet Take 2 tablets (8 mg total) by mouth daily with breakfast (Patient not taking: Reported on 3/23/2022 ) 60 tablet 6    mometasone (ELOCON) 0 1 % cream Apply topically daily for 7 days (Patient not taking: Reported on 11/5/2021) 45 g 0    nystatin (MYCOSTATIN) cream Apply topically 2 (two) times a day for 10 days 30 g 0    potassium chloride (K-DUR,KLOR-CON) 20 mEq tablet Take 1 tablet (20 mEq total) by mouth 2 (two) times a day for 3 days In addition to standing dose  (Patient not taking: Reported on 11/5/2021) 6 tablet 0     No current facility-administered medications for this visit  Allergies   Allergen Reactions    Iron Dextran Swelling    Januvia [Sitagliptin] Shortness Of Breath    Jardiance [Empagliflozin] Shortness Of Breath    Clonazepam Other (See Comments)     Unknown reaction    Codeine Itching and Other (See Comments)     itch;mary kay morphine,takes ultram @home    Adhesive [Medical Tape] Itching     itching    Effexor [Venlafaxine] Itching    Lactose - Food Allergy GI Intolerance    Oxycodone-Acetaminophen Anxiety    Oxycodone-Acetaminophen Itching and Rash     Other reaction(s): Other (See Comments)  (PERCOCET) MILD RASH, not allergic to Acetaminophen        Objective   Vitals: Blood pressure 124/80, pulse 96, height 5' 3" (1 6 m), weight 132 kg (290 lb 6 4 oz), not currently breastfeeding  Physical Exam  Vitals and nursing note reviewed  Constitutional:       General: She is not in acute distress  Appearance: Normal appearance  She is not diaphoretic  HENT:      Head: Normocephalic and atraumatic  Eyes:      General: No scleral icterus  Extraocular Movements: Extraocular movements intact        Conjunctiva/sclera: Conjunctivae normal  Pupils: Pupils are equal, round, and reactive to light  Cardiovascular:      Rate and Rhythm: Normal rate and regular rhythm  Heart sounds: No murmur heard  Pulmonary:      Effort: Pulmonary effort is normal  No respiratory distress  Breath sounds: Decreased breath sounds present  No wheezing  Musculoskeletal:      Cervical back: Normal range of motion  Right lower leg: No edema  Left lower leg: No edema  Lymphadenopathy:      Cervical: No cervical adenopathy  Neurological:      Mental Status: She is alert and oriented to person, place, and time  Mental status is at baseline  Sensory: No sensory deficit  Gait: Gait normal    Psychiatric:         Mood and Affect: Mood normal          Behavior: Behavior normal          Thought Content: Thought content normal          The history was obtained from the review of the chart, patient      Lab Results:   Lab Results   Component Value Date/Time    Hemoglobin A1C 11 8 (A) 03/23/2022 01:18 PM    Hemoglobin A1C 11 4 (H) 11/01/2021 12:39 PM    Hemoglobin A1C 9 6 (H) 06/02/2021 03:29 AM    WBC 11 00 (H) 02/10/2022 01:42 PM    WBC 12 42 (H) 06/07/2021 03:21 AM    WBC 13 86 (H) 06/06/2021 04:55 AM    White Blood Cell Count 12 3 (H) 11/01/2021 12:39 PM    White Blood Cell Count 11 9 (H) 11/01/2021 12:39 PM    White Blood Cell Count 12 9 (H) 09/24/2021 01:08 PM    White Blood Cell Count 13 0 (H) 09/24/2021 01:08 PM    Hemoglobin 12 0 02/10/2022 01:42 PM    Hemoglobin 11 4 11/01/2021 12:39 PM    Hemoglobin 11 4 11/01/2021 12:39 PM    Hemoglobin 12 1 09/24/2021 01:08 PM    Hemoglobin 11 7 09/24/2021 01:08 PM    Hemoglobin 8 7 (L) 06/07/2021 03:21 AM    Hemoglobin 8 7 (L) 06/06/2021 04:55 AM    HCT 36 3 11/01/2021 12:39 PM    HCT 37 2 11/01/2021 12:39 PM    HCT 39 3 09/24/2021 01:08 PM    HCT 38 3 09/24/2021 01:08 PM    Hematocrit 41 6 02/10/2022 01:42 PM    Hematocrit 29 3 (L) 06/07/2021 03:21 AM    Hematocrit 29 3 (L) 06/06/2021 04:55 AM MCV 80 (L) 02/10/2022 01:42 PM    MCV 80 11/01/2021 12:39 PM    MCV 81 11/01/2021 12:39 PM    MCV 79 09/24/2021 01:08 PM    MCV 78 (L) 09/24/2021 01:08 PM    MCV 84 06/07/2021 03:21 AM    MCV 81 (L) 06/06/2021 04:55 AM    Platelet Count 357 63/12/2323 12:39 PM    Platelet Count 994 17/30/0893 12:39 PM    Platelet Count 954 97/97/8091 01:08 PM    Platelet Count 386 84/41/1349 01:08 PM    Platelets 431 06/86/7519 01:42 PM    Platelets 448 59/47/1406 03:21 AM    Platelets 915 19/17/5298 04:55 AM    BUN 10 02/10/2022 01:42 PM    BUN 10 11/01/2021 12:39 PM    BUN 30 (H) 10/25/2021 12:35 PM    BUN 13 10/15/2021 10:41 AM    Potassium 3 9 02/10/2022 01:42 PM    Potassium 4 7 11/01/2021 12:39 PM    Potassium 3 3 (L) 10/25/2021 12:35 PM    Potassium 4 1 10/15/2021 10:41 AM    Chloride 92 (L) 02/10/2022 01:42 PM    Chloride 89 (L) 11/01/2021 12:39 PM    Chloride 69 (LL) 10/25/2021 12:35 PM    Chloride 88 (L) 10/15/2021 10:41 AM    CO2 39 (H) 02/10/2022 01:42 PM    CO2 33 (H) 11/01/2021 12:39 PM    CO2 44 (HH) 10/25/2021 12:35 PM    CO2 34 (H) 10/15/2021 10:41 AM    Creatinine 0 76 02/10/2022 01:42 PM    Creatinine 0 59 11/01/2021 12:39 PM    Creatinine 0 83 10/25/2021 12:35 PM    Creatinine 0 62 10/15/2021 10:41 AM    Creatinine 0 65 06/07/2021 03:21 AM    Creatinine 0 65 06/05/2021 05:08 AM    AST 16 02/10/2022 01:42 PM    AST 69 (H) 11/01/2021 12:39 PM    AST 30 09/24/2021 01:08 PM    AST 21 06/05/2021 05:08 AM    AST 25 06/04/2021 05:19 AM    AST 33 04/13/2021 12:37 PM    ALT 32 02/10/2022 01:42 PM     (H) 11/01/2021 12:39 PM    ALT 57 (H) 09/24/2021 01:08 PM    ALT 47 06/05/2021 05:08 AM    ALT 47 06/04/2021 05:19 AM    ALT 41 (H) 04/13/2021 12:37 PM    Albumin 3 6 02/10/2022 01:42 PM    Albumin 4 0 11/01/2021 12:39 PM    Albumin 4 3 09/24/2021 01:08 PM    Albumin 3 1 (L) 06/05/2021 05:08 AM    Albumin 2 8 (L) 06/04/2021 05:19 AM    Globulin, Total 2 0 11/01/2021 12:39 PM    Globulin, Total 2 2 09/24/2021 01:08 PM Globulin, Total 2 1 04/13/2021 12:37 PM    HDL 47 11/01/2021 12:39 PM    Triglycerides 117 11/01/2021 12:39 PM       Portions of the record may have been created with voice recognition software  Occasional wrong word or "sound a like" substitutions may have occurred due to the inherent limitations of voice recognition software  Read the chart carefully and recognize, using context, where substitutions have occurred

## 2022-03-23 NOTE — PATIENT INSTRUCTIONS
Monitor diet  Make sure to drink plenty water throughout the day  Continue metformin 1000 mg twice a day and glimepiride 8 mg with breakfast     Continue Lantus 28 units daily, and NovoLog 12 units with each meal     Start Trulicity 1 28 mg weekly  We will start paperwork on Dexcom  Check blood sugar 4 times a day  Please send in blood sugar logs every 2 weeks  Contact the office with any concerns or questions  Follow-up in 3 months with lab work completed prior to visit

## 2022-03-25 DIAGNOSIS — K21.9 GASTROESOPHAGEAL REFLUX DISEASE WITHOUT ESOPHAGITIS: ICD-10-CM

## 2022-03-25 DIAGNOSIS — Z79.899 ENCOUNTER FOR LONG-TERM (CURRENT) DRUG USE: ICD-10-CM

## 2022-03-25 RX ORDER — LORAZEPAM 0.5 MG/1
TABLET ORAL
Qty: 90 TABLET | Refills: 0 | Status: SHIPPED | OUTPATIENT
Start: 2022-03-25 | End: 2022-04-26 | Stop reason: SDUPTHER

## 2022-03-25 RX ORDER — OMEPRAZOLE 40 MG/1
40 CAPSULE, DELAYED RELEASE ORAL DAILY
Qty: 90 CAPSULE | Refills: 3 | Status: SHIPPED | OUTPATIENT
Start: 2022-03-25 | End: 2022-06-21 | Stop reason: SDUPTHER

## 2022-03-26 DIAGNOSIS — I27.20 PULMONARY HYPERTENSION (HCC): ICD-10-CM

## 2022-03-26 DIAGNOSIS — I50.32 CHRONIC DIASTOLIC HEART FAILURE (HCC): ICD-10-CM

## 2022-03-26 RX ORDER — TORSEMIDE 20 MG/1
TABLET ORAL
Qty: 120 TABLET | Refills: 0 | Status: SHIPPED | OUTPATIENT
Start: 2022-03-26 | End: 2022-05-17

## 2022-03-30 DIAGNOSIS — J44.9 CHRONIC OBSTRUCTIVE PULMONARY DISEASE, UNSPECIFIED COPD TYPE (HCC): ICD-10-CM

## 2022-03-30 RX ORDER — METFORMIN HYDROCHLORIDE 500 MG/1
TABLET, EXTENDED RELEASE ORAL
Qty: 120 TABLET | Refills: 5 | Status: SHIPPED | OUTPATIENT
Start: 2022-03-30

## 2022-04-07 NOTE — PROGRESS NOTES
1/13/2022  Visit date not found Assessment  1  Asthma, moderate persistent (493 90) (J45 40)   2  Obstructive sleep apnea (327 23) (G47 33)    Plan  Asthma, moderate persistent    · Complete PFT with DLCO; Status:Active; Requested EOX:65HBU2336 10:30AM;    Perform:Formerly [facility]; Order Comments:[facility] jan 4th 1030am; XMK:[id number]; Last Updated By:Fei Guevara; 11/21/2017 8:45:21 AM;Ordered;moderate persistent; Ordered By:Vonda Antonio; Obstructive sleep apnea    · Follow-up visit in 3 months Evaluation and Treatment  Follow-up  Status: Hold For -Scheduling  Requested for: 25CVZ7165   Ordered; Obstructive sleep apnea; Ordered By: Saida Robledo Performed:  Due: 86RER1114   · *Polysomnography, Sleep Study, CPAP/BiPAP titration; Status:Need Information -Financial Authorization; Requested NVA:[id number];    Perform:Formerly [facility]; [phone]; Last Updated By:Fei Guevara; 11/21/2017 8:41:32 AM;Ordered;sleep apnea; Ordered By:Vonda Antonio;  condition(s) and/or medications: : Stress/situational depression  there any other medical conditions or medications that would explain these    symptoms? : Yes  is the sleep disturbance affecting the patient's ability to function? : Daytime fatigue  History/Symptoms: : Has WILMA - uses CPAP - thinks she needs more pressure  a face-to-face visit, with sleep documentation, performed prior to the order for sleep    study? : Yes  Study Only or Consult : Sleep Study Only Follow up with Referring Physician    Results/Data  (1) CBC/PLT/DIFF [id number] 09:02AM Laith Olivares     Test Name Result Flag Reference   WBC 8 9 x10E3/uL  3 4-10 8   RBC 5 27 x10E6/uL  3 77-5 28   Hemoglobin 13 0 g/dL  11 1-15 9   Hematocrit 39 7 %  34 0-46  6   MCV 75 fL L 79-97   MCH 24 7 pg L 26 6-33 0   MCHC 32 7 g/dL  31 5-35 7   RDW 16 6 % H 12 3-15 4   Platelets 535 Y59H7/KN  150-379   Neutrophils 60 %     Lymphs 26 %     Monocytes 7 %     Eos 6 %     Basos 1 %     Neutrophils (Absolute) 5 4 x10E3/uL  1 4-7 0 Lymphs (Absolute) 2 3 x10E3/uL  0 7-3 1   Monocytes(Absolute) 0 6 x10E3/uL  0 1-0 9   Eos (Absolute) 0 6 x10E3/uL H 0 0-0 4   Baso (Absolute) 0 1 x10E3/uL  0 0-0 2   Immature Granulocytes 0 %     Immature Grans (Abs) 0 0 x10E3/uL  0 0-0 1     * XR CHEST PA & LATERAL [id number] 03:21PM Mckenzie Suresh Order Number: [id number]     Test Name Result Flag Reference   XR CHEST PA & LATERAL (Report)       CHEST - DUAL ENERGY   INDICATION: Cough with shortness of breath and mid chest pain x2 months  COMPARISON: 11/10/2016, 11/18/2016  VIEWS: PA (including soft tissue/bone algorithms) and lateral projections; 4 images   FINDINGS:   The cardiomediastinal silhouette is unremarkable  No acute infiltrates  Persistent linear opacities in the lingula consistent with atelectasis or scarring  No pleural effusions  Visualized osseous structures appear within normal limits for patient's age  IMPRESSION:   No acute pulmonary disease  Workstation performed: [id number]   Signed by:  Karen Rosenberg MD  12/19/16       Discussion/Summary  Discussion Summary:   Asthma-I have asked her to continue Dulera twice daily and albuterol as needed  I have asked her to get pulmonary function tests after the holidays  We reviewed allergy testing which showed no significant allergies  I do not believe she would benefit from Singulair  apnea-she will continue on current auto Pap machine  Because she is having daytime fatigue and feels the pressure needs to be greater I have asked her to get a CPAP retitration study  She is compliant with the machine and should continue nightly use it till we are able to do the retitration  will see her back in 3 months after above testing or sooner if pulmonary complications arise  Goals and Barriers: The patient has the current Goals: Decreased stress, taking care of ill parents  The patent has the current Barriers: Her parents refuse to have others help take care of them     Patient's Capacity to Self-Care: Patient is able to Self-Care  Medication SE Review and Pt Understands Tx: Possible side effects of new medications were reviewed with the patient/guardian today  The treatment plan was reviewed with the patient/guardian  The patient/guardian understands and agrees with the treatment plan      Active Problems    1  Asthma, moderate persistent (493 90) (J45 40)   2  Benign essential hypertension (401 1) (I10)   3  Cervical radiculopathy (723 4) (M54 12)   4  Cervicalgia (723 1) (M54 2)   5  Chronic low back pain (724 2,338 29) (M54 5,G89 29)   6  Depression with anxiety (300 4) (F41 8)   7  Diabetes mellitus type 2, controlled (250 00) (E11 9)   8  Diabetes mellitus with hyperglycemia (250 00) (E11 65)   9  Elevated liver enzymes (790 5) (R74 8)   10  Esophageal reflux (530 81) (K21 9)   11  Hand pain (729 5) (M79 643)   12  Hypercholesterolemia (272 0) (E78 00)   13  Iron deficiency anemia (280 9) (D50 9)   14  Lumbar radiculopathy (724 4) (M54 16)   15  Obstructive sleep apnea (327 23) (G47 33)   16  Paresthesia of upper extremity (782 0) (R20 2)   17  Restless legs syndrome (333 94) (G25 81)   18  Right shoulder pain (719 41) (M25 511)   19  Sexual dysfunction (302 70) (R37)   20  Status post bariatric surgery (V45 86) (Z98 84)   21  Tenosynovitis of finger (727 05) (M65 9)   22  Tension type headache (339 10) (G44 209)   23  Upper back pain on left side (724 5) (M54 9)   24  Weakness of upper extremity (729 89) (R29 898)    Chief Complaint  Chief Complaint Chronic Condition [facility]: Patient is here today for follow up of chronic conditions described in HPI  History of Present Illness  HPI: After last visit a year ago, the patient states that she lost her insurance and was unable to do pulmonary function tests or a CPAP titration  Several months ago her primary care doctor ordered her a new CPAP machine however she did not have a retitration study  She thinks it may be an auto PAP machine   She wears it every night but states that time she feels she wishes the pressure was more  She initially felt better with less daytime fatigue after using the machine however recently she has been feeling worse  She is not sure if she is feeling worse because of increased stress in her life  asthma was not under good control this summer but states over the past couple days with the cold weather it is doing better  She has had some shortness of breath, wheezing and chest tightness that is relieved with albuterol nebulizer  She is using Dulera twice daily and was using albuterol 1-3 times daily  She states she has not required albuterol at all in the past 3 days  She has a chronic cough in the morning which she attributes to postnasal drip   she has stopped drinking alcohol since our last visit  It has been about 7 months  Unfortunately however she continues to still smoke  She is currently smoking 4 cigarettes daily  With the amount of stress of her taking care of her chronically 0 patient's it is unlikely she is going to stop smoking  did receive her flu shot this year  Review of Systems  Complete-Female - Pulm:  Constitutional: feeling tired-- and-- recent weight loss, but-- no fever  ENT: nasal discharge  Cardiovascular: no chest pain-- and-- no lower extremity edema  Respiratory: as noted in HPI  Psychiatric: depression  Past Medical History  1  History of Abdominal pain, periumbilical (591 79) (W67 02)   2  History of Acute gastroenteritis (558 9) (K52 9)   3  History of Acute pain of right shoulder (719 41) (M25 511)   4  History of Acute respiratory infection (519 8) (J22)   5  History of Acute upper respiratory infection (465 9) (J06 9)   6  Asthma, moderate persistent (493 90) (J45 40)   7  Cervical radiculopathy (723 4) (M54 12)   8  Chronic low back pain (724 2,338 29) (M54 5,G89 29)   9  History of Community acquired pneumonia (5) (J18 9)   10   Diabetes mellitus with hyperglycemia (250 00) (E11 65)   11  Elevated liver enzymes (790 5) (R74 8)   12  History of acute bronchitis (V12 69) (Z87 09)   13  History of acute bronchitis (V12 69) (Z87 09)   14  History of acute pharyngitis (V12 69) (Z87 09)   15  History of diarrhea (V12 79) (Z87 898)   16  History of fatigue (V13 89) (Z87 898)   17  History of hypokalemia (V12 29) (Z86 39)   18  History of nausea (V12 79) (Z87 898)   19  History of palpitations (V12 59) (Z87 898)   20  Denied: History of substance abuse   21  History of upper respiratory infection (V12 09) (Z87 09)   22  History of Palpitations (785 1) (R00 2)   23  Paresthesia of upper extremity (782 0) (R20 2)   24  History of Plantar fasciitis (728 71) (M72 2)   25  Right shoulder pain (719 41) (M25 511)   26  Sexual dysfunction (302 70) (R37)   27  Status post bariatric surgery (V45 86) (Z98 84)   28  Tenosynovitis of finger (727 05) (M65 9)   29  Upper back pain on left side (724 5) (M54 9)   30  Weakness of upper extremity (729 89) (R29 898)    Surgical History    1  History of Cholecystectomy Laparoscopic   2  History of Gastric Surgery For Morbid Obesity Bypass With Tima-en-Y   3  History of Neuroplasty Decompression Median Nerve At Carpal Tunnel  Surgical History Reviewed: The surgical history was reviewed and updated today  Family History  Mother    1  Family history of Atrial fibrillation   2  Family history of Dementia   3  Denied: Family history of substance abuse   4  Family history of Heart problem   5  Family history of Living and Healthy   6  Denied: Family history of Mental health problem   7  Family history of Seizures  Father    6  Family history of Atrial fibrillation   9  Denied: Family history of substance abuse   10  Family history of Living and Healthy   6  Denied: Family history of Mental health problem   12  Family history of Parkinsons disease  Family History Reviewed: The family history was reviewed and updated today         Social History     · Cigarette smoker (305 1) (F17 210)   · Light tobacco smoker (305 1) (F17 200)   · History of Never A Smoker   · No drug use   · Occasional alcohol use  Social History Reviewed: The social history was reviewed and updated today  The social history was reviewed and is unchanged  Current Meds   1  Leydi Contour Next Monitor w/Device Kit; TEST ONCE DAILY; Therapy: 51WGW0292 to (Last Rx:77Uau4525)  Requested for: 53JQF9752 Ordered   2  Leydi Contour Next Test In Vitro Strip; TEST BS DAILY; Therapy: 08JHS4271 to (Last Rx:09Rwp0603)  Requested for: 17QJM4832 Ordered   3  Dulera 200-5 MCG/ACT Inhalation Aerosol; INHALE 1 PUFFS Twice daily; Therapy: 59NDW4509 to (Evaluate:22Jun2018)  Requested for: 25Oct2017; Last OW:15IYF0693 Ordered   4  Furosemide 20 MG Oral Tablet; TAKE 1 TAB BY MOUTH DAILY; Therapy: 18MLU5256 to (Evaluate:71Nuo3433)  Requested for: 07Pyi1451 Recorded   5  LORazepam 0 5 MG Oral Tablet; TAKE TWO TABLETS BY MOUTH AT BEDTIME AND ONE TABLET BY MOUTH EVERY 6 HOURS AS NEEDED; Therapy: 09EAK6057 to (Jimena Mini)  Requested for: ; Last XN:44EGK7293 Ordered   6  Metoprolol Succinate ER 50 MG Oral Tablet Extended Release 24 Hour; TAKE 1 TABLET DAILY; Therapy: 07ILS0119 to (Evaluate:12Nov2017)  Requested for: 07Vwf3699; Last Rx:47Ssq8411 Ordered   7  Pantoprazole Sodium 40 MG Oral Tablet Delayed Release; TAKE 1 TABLET TWICE DAILY 30 MINUTES BEFORE BREAKFAST AND DINNER for 2 weeks then decrease to 1 tablet daily; Therapy: 62NBE6493 to (Cathy Freeman)  Requested for: 12OWU3236; Last Rx:45Qej6657 Ordered   8  Potassium Chloride Bonny ER 10 MEQ Oral Tablet Extended Release; TAKE 1 TABLET TWICE DAILY; Therapy: 23MHR1935 to (Ishaan Dickson)  Requested for: 12Apr2017; Last Rx:94Wji1661 Ordered   9  Potassium Chloride ER 10 MEQ Oral Tablet Extended Release; take one tablet by mouth twice daily; Therapy: 89DLS8852 to (Francisco Javier Akbar)  Requested for: 82LQS0871; Last Rx:03Nov2017 Ordered   10   ProAir  (90 Base) MCG/ACT Inhalation Aerosol Solution; INHALE 1 TO 2 PUFFS  EVERY 4 TO 6 HOURS AS NEEDED; Therapy: 97ROC6988 to (Last Rx:29Mar2017)  Requested for: 29Mar2017 Ordered   11  ROPINIRole HCl - 2 MG Oral Tablet; TAKE ONE TABLET BY MOUTH AT BEDTIME; Therapy: 07Dqf8285 to (Evaluate:98Emt9488)  Requested for: 04Oct2017; Last  Rx:04Oct2017 Ordered   12  TraMADol HCl - 50 MG Oral Tablet; take 2 tabs by mouth twice a day; Therapy: 96CAL4321 to (Last Rx:16Nov2017) Ordered   13  TraZODone HCl - 150 MG Oral Tablet; TAKE 1/3 TO 1 TABLET AT BEDTIME FOR SLEEP; Therapy: 67KIB4086 to [phone] 76 832)  Requested for: 39PVT0208; Last  Rx:04Oct2017 Ordered  Medication List Reviewed: The medication list was reviewed and updated today  Allergies  1  Iron Dextran SOLN   2  Codeine Derivatives   3  OxyCODONE HCl CAPS   4  ClonazePAM TABS   5  Januvia TABS   6  Jardiance TABS  7  Seasonal    Vitals  Vital Signs    Recorded: 21Nov2017 08:09AM   Temperature 98 3 F   Heart Rate 88   Respiration 14   Systolic 703   Diastolic 78   Height 5 ft 3 in   Weight 223 lb    BMI Calculated 39 5   BSA Calculated 2 03   O2 Saturation 97, RA       Physical Exam   Constitutional  General appearance: No acute distress, well appearing and well nourished  Ears, Nose, Mouth, and Throat  Nasal mucosa, septum, and turbinates: Normal without edema or erythema  Lips, teeth, and gums: Normal, good dentition  Oropharynx: Normal with no erythema, edema, exudate or lesions  Neck  Neck: Supple, symmetric, trachea midline, no masses  Jugular veins: Normal    Pulmonary  Chest: Normal    Respiratory effort: No increased work of breathing or signs of respiratory distress  Auscultation of lungs: Clear to auscultation, no rales, no crackles, no wheezing  Cardiovascular  Auscultation of heart: Normal rate and rhythm, normal S1 and S2, no murmurs     Examination of extremities for edema and/or varicosities: Normal    Abdomen  Abdomen: Soft, non-tender  Lymphatic  Palpation of lymph nodes in neck: No lymphadenopathy  Musculoskeletal  Gait and station: Normal    Digits and nails: Normal without clubbing or cyanosis  Neurologic  Mental Status: Normal  Not confused, no evidence of dementia, good comprehension, good concentration  Skin  Skin and subcutaneous tissue: Limited exam shows no rash  Psychiatric  Orientation to person, place and time: Normal    Mood and affect: Normal        Health Management  History of Encounter for screening colonoscopy   COLONOSCOPY; every 10 years; Last 10KTN3443; Next Due: 19PMT4282; Active    Future Appointments    Date/Time Provider Specialty Site   12/14/2017 03:30 PM CARA Monteiro  [address] [place] [address]   01/08/2018 10:45 AM CARA Monteiro   [facility]   01/25/2018 10:00 AM Jacy Olivares MD Family Medicine Mikie Rodriguez MD   02/20/2018 10:00 AM Suzy Gomez DO Pulmonary Medicine [facility] Kevin [place]wood   Electronically signed by : Russ Au DO; Nov 21 2017  9:06AM EST                       (Author)

## 2022-04-26 ENCOUNTER — TELEPHONE (OUTPATIENT)
Dept: FAMILY MEDICINE CLINIC | Facility: HOSPITAL | Age: 62
End: 2022-04-26

## 2022-04-26 DIAGNOSIS — Z79.899 ENCOUNTER FOR LONG-TERM (CURRENT) DRUG USE: ICD-10-CM

## 2022-04-27 RX ORDER — LORAZEPAM 0.5 MG/1
TABLET ORAL
Qty: 90 TABLET | Refills: 0 | Status: SHIPPED | OUTPATIENT
Start: 2022-04-27 | End: 2022-05-23 | Stop reason: SDUPTHER

## 2022-05-04 DIAGNOSIS — E11.65 TYPE 2 DIABETES MELLITUS WITH HYPERGLYCEMIA, UNSPECIFIED WHETHER LONG TERM INSULIN USE (HCC): ICD-10-CM

## 2022-05-04 RX ORDER — LANCETS
EACH MISCELLANEOUS
Qty: 100 EACH | Refills: 5 | Status: SHIPPED | OUTPATIENT
Start: 2022-05-04

## 2022-05-06 DIAGNOSIS — Z79.4 TYPE 2 DIABETES MELLITUS WITH STAGE 2 CHRONIC KIDNEY DISEASE, WITH LONG-TERM CURRENT USE OF INSULIN (HCC): ICD-10-CM

## 2022-05-06 DIAGNOSIS — N18.2 TYPE 2 DIABETES MELLITUS WITH STAGE 2 CHRONIC KIDNEY DISEASE, WITH LONG-TERM CURRENT USE OF INSULIN (HCC): ICD-10-CM

## 2022-05-06 DIAGNOSIS — E11.22 TYPE 2 DIABETES MELLITUS WITH STAGE 2 CHRONIC KIDNEY DISEASE, WITH LONG-TERM CURRENT USE OF INSULIN (HCC): ICD-10-CM

## 2022-05-06 DIAGNOSIS — E11.22 TYPE 2 DIABETES MELLITUS WITH STAGE 2 CHRONIC KIDNEY DISEASE, WITHOUT LONG-TERM CURRENT USE OF INSULIN (HCC): ICD-10-CM

## 2022-05-06 DIAGNOSIS — F32.1 CURRENT MODERATE EPISODE OF MAJOR DEPRESSIVE DISORDER WITHOUT PRIOR EPISODE (HCC): ICD-10-CM

## 2022-05-06 DIAGNOSIS — N18.2 TYPE 2 DIABETES MELLITUS WITH STAGE 2 CHRONIC KIDNEY DISEASE, WITHOUT LONG-TERM CURRENT USE OF INSULIN (HCC): ICD-10-CM

## 2022-05-06 DIAGNOSIS — G47.00 INSOMNIA, UNSPECIFIED TYPE: ICD-10-CM

## 2022-05-06 DIAGNOSIS — E11.65 TYPE 2 DIABETES MELLITUS WITH HYPERGLYCEMIA, WITHOUT LONG-TERM CURRENT USE OF INSULIN (HCC): ICD-10-CM

## 2022-05-06 RX ORDER — TRAZODONE HYDROCHLORIDE 150 MG/1
TABLET ORAL
Qty: 90 TABLET | Refills: 0 | Status: SHIPPED | OUTPATIENT
Start: 2022-05-06 | End: 2022-06-21

## 2022-05-06 RX ORDER — ESCITALOPRAM OXALATE 20 MG/1
TABLET ORAL
Qty: 90 TABLET | Refills: 0 | Status: SHIPPED | OUTPATIENT
Start: 2022-05-06 | End: 2022-06-21

## 2022-05-07 RX ORDER — INSULIN GLARGINE 100 [IU]/ML
28 INJECTION, SOLUTION SUBCUTANEOUS
Qty: 15 ML | Refills: 1 | Status: SHIPPED | OUTPATIENT
Start: 2022-05-07 | End: 2022-06-21

## 2022-05-07 RX ORDER — GLIMEPIRIDE 4 MG/1
TABLET ORAL
Qty: 60 TABLET | Refills: 6 | Status: SHIPPED | OUTPATIENT
Start: 2022-05-07 | End: 2022-06-17

## 2022-05-07 RX ORDER — INSULIN LISPRO 100 [IU]/ML
12 INJECTION, SOLUTION INTRAVENOUS; SUBCUTANEOUS
Qty: 15 ML | Refills: 1 | Status: SHIPPED | OUTPATIENT
Start: 2022-05-07 | End: 2022-06-21

## 2022-05-17 DIAGNOSIS — I50.32 CHRONIC DIASTOLIC HEART FAILURE (HCC): ICD-10-CM

## 2022-05-17 DIAGNOSIS — I27.20 PULMONARY HYPERTENSION (HCC): ICD-10-CM

## 2022-05-17 RX ORDER — TORSEMIDE 20 MG/1
TABLET ORAL
Qty: 120 TABLET | Refills: 0 | Status: SHIPPED | OUTPATIENT
Start: 2022-05-17 | End: 2022-05-18

## 2022-05-18 ENCOUNTER — TELEPHONE (OUTPATIENT)
Dept: OTHER | Facility: OTHER | Age: 62
End: 2022-05-18

## 2022-05-18 NOTE — TELEPHONE ENCOUNTER
Patient called in requesting assistance to set up her Dexcom glucose monitoring system  Please follow up with patient

## 2022-05-23 DIAGNOSIS — I27.20 PULMONARY HYPERTENSION (HCC): ICD-10-CM

## 2022-05-23 DIAGNOSIS — I10 ESSENTIAL HYPERTENSION: ICD-10-CM

## 2022-05-23 DIAGNOSIS — Z79.899 ENCOUNTER FOR LONG-TERM (CURRENT) DRUG USE: ICD-10-CM

## 2022-05-23 DIAGNOSIS — I50.32 CHRONIC DIASTOLIC HEART FAILURE (HCC): ICD-10-CM

## 2022-05-24 DIAGNOSIS — E11.22 TYPE 2 DIABETES MELLITUS WITH STAGE 2 CHRONIC KIDNEY DISEASE, WITH LONG-TERM CURRENT USE OF INSULIN (HCC): Primary | ICD-10-CM

## 2022-05-24 DIAGNOSIS — N18.2 TYPE 2 DIABETES MELLITUS WITH STAGE 2 CHRONIC KIDNEY DISEASE, WITH LONG-TERM CURRENT USE OF INSULIN (HCC): Primary | ICD-10-CM

## 2022-05-24 DIAGNOSIS — Z79.4 TYPE 2 DIABETES MELLITUS WITH STAGE 2 CHRONIC KIDNEY DISEASE, WITH LONG-TERM CURRENT USE OF INSULIN (HCC): Primary | ICD-10-CM

## 2022-05-26 RX ORDER — METOPROLOL SUCCINATE 50 MG/1
50 TABLET, EXTENDED RELEASE ORAL DAILY
Qty: 90 TABLET | Refills: 1 | Status: SHIPPED | OUTPATIENT
Start: 2022-05-26

## 2022-05-26 RX ORDER — TORSEMIDE 20 MG/1
40 TABLET ORAL 2 TIMES DAILY
Qty: 360 TABLET | Refills: 1 | Status: SHIPPED | OUTPATIENT
Start: 2022-05-26 | End: 2022-05-27

## 2022-05-26 RX ORDER — LORAZEPAM 0.5 MG/1
TABLET ORAL
Qty: 90 TABLET | Refills: 0 | Status: SHIPPED | OUTPATIENT
Start: 2022-05-26 | End: 2022-06-21 | Stop reason: SDUPTHER

## 2022-05-27 DIAGNOSIS — I13.0 HYPERTENSIVE HEART AND CHRONIC KIDNEY DISEASE WITH HEART FAILURE AND STAGE 1 THROUGH STAGE 4 CHRONIC KIDNEY DISEASE, OR CHRONIC KIDNEY DISEASE (HCC): Primary | ICD-10-CM

## 2022-05-27 RX ORDER — TORSEMIDE 20 MG/1
60 TABLET ORAL 2 TIMES DAILY
Qty: 180 TABLET | Refills: 5 | Status: SHIPPED | OUTPATIENT
Start: 2022-05-27

## 2022-05-27 NOTE — TELEPHONE ENCOUNTER
Med was increased by cardiology pt states that was done a while ago    Med list updated and new rx sent to pharm

## 2022-06-10 ENCOUNTER — TELEPHONE (OUTPATIENT)
Dept: FAMILY MEDICINE CLINIC | Facility: HOSPITAL | Age: 62
End: 2022-06-10

## 2022-06-16 NOTE — MALNUTRITION/BMI
This medical record reflects one or more clinical indicators suggestive of malnutrition and/or morbid obesity  BMI Findings:  Adult BMI Classifications: Morbid Obesity 50-59 9     Body mass index is 51 44 kg/m²  Goal diet: CCD2, Cardiac     See Nutrition note dated 06/03/21 for additional details  Completed nutrition assessment is viewable in the nutrition documentation 
with power tape/Aluminum Finger Splint

## 2022-06-17 ENCOUNTER — OFFICE VISIT (OUTPATIENT)
Dept: FAMILY MEDICINE CLINIC | Facility: HOSPITAL | Age: 62
End: 2022-06-17
Payer: COMMERCIAL

## 2022-06-17 VITALS
WEIGHT: 287.8 LBS | DIASTOLIC BLOOD PRESSURE: 82 MMHG | OXYGEN SATURATION: 94 % | HEART RATE: 112 BPM | TEMPERATURE: 97 F | HEIGHT: 63 IN | SYSTOLIC BLOOD PRESSURE: 120 MMHG | BODY MASS INDEX: 50.99 KG/M2

## 2022-06-17 DIAGNOSIS — I50.32 CHRONIC DIASTOLIC CONGESTIVE HEART FAILURE (HCC): ICD-10-CM

## 2022-06-17 DIAGNOSIS — J96.12 CHRONIC RESPIRATORY FAILURE WITH HYPOXIA AND HYPERCAPNIA (HCC): ICD-10-CM

## 2022-06-17 DIAGNOSIS — E11.65 TYPE 2 DIABETES MELLITUS WITH HYPERGLYCEMIA, UNSPECIFIED WHETHER LONG TERM INSULIN USE (HCC): ICD-10-CM

## 2022-06-17 DIAGNOSIS — Z12.31 ENCOUNTER FOR SCREENING MAMMOGRAM FOR MALIGNANT NEOPLASM OF BREAST: ICD-10-CM

## 2022-06-17 DIAGNOSIS — L72.9 BENIGN SKIN CYST: Primary | ICD-10-CM

## 2022-06-17 DIAGNOSIS — F32.1 CURRENT MODERATE EPISODE OF MAJOR DEPRESSIVE DISORDER WITHOUT PRIOR EPISODE (HCC): ICD-10-CM

## 2022-06-17 DIAGNOSIS — J96.11 CHRONIC RESPIRATORY FAILURE WITH HYPOXIA AND HYPERCAPNIA (HCC): ICD-10-CM

## 2022-06-17 LAB
LEFT EYE DIABETIC RETINOPATHY: NORMAL
LEFT EYE IMAGE QUALITY: NORMAL
LEFT EYE MACULAR EDEMA: NORMAL
LEFT EYE OTHER RETINOPATHY: NORMAL
RIGHT EYE DIABETIC RETINOPATHY: NORMAL
RIGHT EYE IMAGE QUALITY: NORMAL
RIGHT EYE MACULAR EDEMA: NORMAL
RIGHT EYE OTHER RETINOPATHY: NORMAL
SEVERITY (EYE EXAM): NORMAL

## 2022-06-17 PROCEDURE — 2025F 7 FLD RTA PHOTO W/O RTNOPTHY: CPT | Performed by: FAMILY MEDICINE

## 2022-06-17 PROCEDURE — 3008F BODY MASS INDEX DOCD: CPT | Performed by: FAMILY MEDICINE

## 2022-06-17 PROCEDURE — 99214 OFFICE O/P EST MOD 30 MIN: CPT | Performed by: FAMILY MEDICINE

## 2022-06-17 PROCEDURE — 1036F TOBACCO NON-USER: CPT | Performed by: FAMILY MEDICINE

## 2022-06-17 PROCEDURE — 92250 FUNDUS PHOTOGRAPHY W/I&R: CPT | Performed by: FAMILY MEDICINE

## 2022-06-20 NOTE — PROGRESS NOTES
Assessment/Plan:      Problem List Items Addressed This Visit        Endocrine    Type 2 diabetes mellitus with hyperglycemia ([facility] [address] )    Relevant Orders    IRIS Diabetic eye exam (Completed)       Respiratory    Chronic respiratory failure with hypoxia and hypercapnia (HCC)       Cardiovascular and Mediastinum    Chronic diastolic congestive heart failure (HCC)       Other    Current moderate episode of major depressive disorder without prior episode ([facility] [address] )      Other Visit Diagnoses     Benign skin cyst    -  Primary    Encounter for screening mammogram for malignant neoplasm of breast        Relevant Orders    Mammo screening bilateral w 3d & cad           Plan/Discussion:    Skin lesions appear to be benign  Monitor  Diabetes  Uncontrolled  Working with Endo  IRIS exam completed today  Mammogram reminder and order  COPD/ASthma  Currently stable  Working with [employer]  No exacerbation  Subjective:   Chief Complaint   Patient presents with    Mass     Under left arm        Patient ID: Talita Trevino is a 59 y o  female  Pt here for concerns of a skin lesion in the left axiallary area  Unclear as to how long it has been there  No tenderness  No increase in size  Is superficial on the skin  Otherwise reports things are okay  Working with [employer] regarding her diabetes control  Copd  No exacerbation  Has been stable  On oxygen  The following portions of the patient's history were reviewed and updated as appropriate: allergies, current medications, past family history, past medical history, past social history, past surgical history and problem list     Review of Systems   Constitutional: Negative  Negative for activity change, appetite change, chills, diaphoresis, fatigue and fever  HENT: Negative for congestion, facial swelling and sore throat  Respiratory: Negative  Negative for apnea, cough, chest tightness and shortness of breath      Cardiovascular: Negative  Negative for chest pain and palpitations  Gastrointestinal: Negative  Negative for abdominal distention, abdominal pain, blood in stool, constipation, diarrhea and nausea  Genitourinary: Negative  Negative for difficulty urinating, dysuria, flank pain and frequency  Objective:  Vitals:    06/17/22 1137   BP: 120/82   Pulse: (!) 112   Temp: (!) 97 °F (36 1 °C)   TempSrc: Tympanic   SpO2: 94%   Weight: 131 kg (287 lb 12 8 oz)   Height: 5' 3" (1 6 m)     BP Readings from Last 6 Encounters:   06/17/22 120/82   03/23/22 124/80   03/10/22 136/80   02/10/22 128/78   11/15/21 124/62   11/05/21 140/98      Wt Readings from Last 6 Encounters:   06/17/22 131 kg (287 lb 12 8 oz)   03/23/22 132 kg (290 lb 6 4 oz)   03/10/22 130 kg (286 lb 3 2 oz)   02/10/22 131 kg (288 lb)   11/23/21 132 kg (290 lb)   11/15/21 132 kg (290 lb 9 6 oz)             Physical Exam  Vitals and nursing note reviewed  Constitutional:       Appearance: Normal appearance  She is well-developed  She is obese  HENT:      Head: Normocephalic and atraumatic  Right Ear: External ear normal       Left Ear: External ear normal       Nose: Nose normal    Eyes:      Conjunctiva/sclera: Conjunctivae normal       Pupils: Pupils are equal, round, and reactive to light  Neck:      Thyroid: No thyromegaly  Trachea: No tracheal deviation  Cardiovascular:      Rate and Rhythm: Normal rate and regular rhythm  Heart sounds: Normal heart sounds  No murmur heard  Pulmonary:      Effort: Pulmonary effort is normal  No respiratory distress  Breath sounds: Normal breath sounds  No wheezing  Abdominal:      General: Bowel sounds are normal       Palpations: Abdomen is soft  Musculoskeletal:         General: Normal range of motion  Cervical back: Normal range of motion and neck supple  Skin:     General: Skin is warm and dry        Comments: Two circular flesh colored papules in the left axilla measuring about 3 mm in diameter  Neurological:      Mental Status: She is alert and oriented to person, place, and time     Psychiatric:         Mood and Affect: Mood normal          Behavior: Behavior normal

## 2022-06-21 DIAGNOSIS — E11.69 DIABETES MELLITUS TYPE 2 IN OBESE (HCC): ICD-10-CM

## 2022-06-21 DIAGNOSIS — N18.2 TYPE 2 DIABETES MELLITUS WITH STAGE 2 CHRONIC KIDNEY DISEASE, WITH LONG-TERM CURRENT USE OF INSULIN (HCC): ICD-10-CM

## 2022-06-21 DIAGNOSIS — Z79.899 ENCOUNTER FOR LONG-TERM (CURRENT) DRUG USE: ICD-10-CM

## 2022-06-21 DIAGNOSIS — E11.22 TYPE 2 DIABETES MELLITUS WITH STAGE 2 CHRONIC KIDNEY DISEASE, WITHOUT LONG-TERM CURRENT USE OF INSULIN (HCC): ICD-10-CM

## 2022-06-21 DIAGNOSIS — E11.22 TYPE 2 DIABETES MELLITUS WITH STAGE 2 CHRONIC KIDNEY DISEASE, WITH LONG-TERM CURRENT USE OF INSULIN (HCC): ICD-10-CM

## 2022-06-21 DIAGNOSIS — F32.1 CURRENT MODERATE EPISODE OF MAJOR DEPRESSIVE DISORDER WITHOUT PRIOR EPISODE (HCC): ICD-10-CM

## 2022-06-21 DIAGNOSIS — K21.9 GASTROESOPHAGEAL REFLUX DISEASE WITHOUT ESOPHAGITIS: ICD-10-CM

## 2022-06-21 DIAGNOSIS — E11.65 TYPE 2 DIABETES MELLITUS WITH HYPERGLYCEMIA, UNSPECIFIED WHETHER LONG TERM INSULIN USE (HCC): ICD-10-CM

## 2022-06-21 DIAGNOSIS — I48.91 ATRIAL FIBRILLATION, UNSPECIFIED TYPE (HCC): ICD-10-CM

## 2022-06-21 DIAGNOSIS — G47.00 INSOMNIA, UNSPECIFIED TYPE: ICD-10-CM

## 2022-06-21 DIAGNOSIS — E66.9 DIABETES MELLITUS TYPE 2 IN OBESE (HCC): ICD-10-CM

## 2022-06-21 DIAGNOSIS — Z79.4 TYPE 2 DIABETES MELLITUS WITH STAGE 2 CHRONIC KIDNEY DISEASE, WITH LONG-TERM CURRENT USE OF INSULIN (HCC): ICD-10-CM

## 2022-06-21 DIAGNOSIS — N18.2 TYPE 2 DIABETES MELLITUS WITH STAGE 2 CHRONIC KIDNEY DISEASE, WITHOUT LONG-TERM CURRENT USE OF INSULIN (HCC): ICD-10-CM

## 2022-06-21 PROCEDURE — 3066F NEPHROPATHY DOC TX: CPT | Performed by: FAMILY MEDICINE

## 2022-06-21 RX ORDER — ESCITALOPRAM OXALATE 20 MG/1
TABLET ORAL
Qty: 90 TABLET | Refills: 0 | Status: SHIPPED | OUTPATIENT
Start: 2022-06-21

## 2022-06-21 RX ORDER — APIXABAN 5 MG/1
TABLET, FILM COATED ORAL
Qty: 60 TABLET | Refills: 5 | Status: SHIPPED | OUTPATIENT
Start: 2022-06-21

## 2022-06-21 RX ORDER — ATORVASTATIN CALCIUM 40 MG/1
TABLET, FILM COATED ORAL
Qty: 30 TABLET | Refills: 5 | Status: SHIPPED | OUTPATIENT
Start: 2022-06-21

## 2022-06-21 RX ORDER — INSULIN GLARGINE 100 [IU]/ML
28 INJECTION, SOLUTION SUBCUTANEOUS
Qty: 15 ML | Refills: 1 | Status: SHIPPED | OUTPATIENT
Start: 2022-06-21

## 2022-06-21 RX ORDER — INSULIN LISPRO 100 [IU]/ML
12 INJECTION, SOLUTION INTRAVENOUS; SUBCUTANEOUS
Qty: 15 ML | Refills: 1 | Status: SHIPPED | OUTPATIENT
Start: 2022-06-21

## 2022-06-21 RX ORDER — TRAZODONE HYDROCHLORIDE 150 MG/1
TABLET ORAL
Qty: 90 TABLET | Refills: 0 | Status: SHIPPED | OUTPATIENT
Start: 2022-06-21

## 2022-06-21 RX ORDER — BLOOD SUGAR DIAGNOSTIC
STRIP MISCELLANEOUS
Qty: 100 STRIP | Refills: 3 | Status: SHIPPED | OUTPATIENT
Start: 2022-06-21

## 2022-06-22 RX ORDER — OMEPRAZOLE 40 MG/1
40 CAPSULE, DELAYED RELEASE ORAL DAILY
Qty: 90 CAPSULE | Refills: 3 | Status: SHIPPED | OUTPATIENT
Start: 2022-06-22

## 2022-06-22 RX ORDER — LORAZEPAM 0.5 MG/1
TABLET ORAL
Qty: 90 TABLET | Refills: 0 | Status: SHIPPED | OUTPATIENT
Start: 2022-06-22 | End: 2022-07-19 | Stop reason: SDUPTHER

## 2022-07-19 DIAGNOSIS — Z79.899 ENCOUNTER FOR LONG-TERM (CURRENT) DRUG USE: ICD-10-CM

## 2022-07-19 RX ORDER — LORAZEPAM 0.5 MG/1
TABLET ORAL
Qty: 90 TABLET | Refills: 0 | Status: SHIPPED | OUTPATIENT
Start: 2022-07-19 | End: 2022-08-16 | Stop reason: SDUPTHER

## 2022-07-26 DIAGNOSIS — J45.901 ASTHMA WITH COPD WITH EXACERBATION: ICD-10-CM

## 2022-07-26 DIAGNOSIS — J44.1 ASTHMA WITH COPD WITH EXACERBATION: ICD-10-CM

## 2022-07-26 RX ORDER — ALBUTEROL SULFATE 90 UG/1
2 AEROSOL, METERED RESPIRATORY (INHALATION) EVERY 6 HOURS PRN
Qty: 8.5 G | Refills: 3 | Status: SHIPPED | OUTPATIENT
Start: 2022-07-26 | End: 2023-06-06

## 2022-07-26 RX ORDER — PREDNISONE 20 MG/1
40 TABLET ORAL DAILY
Qty: 10 TABLET | Refills: 0 | Status: SHIPPED | OUTPATIENT
Start: 2022-07-26 | End: 2022-07-31

## 2022-07-27 ENCOUNTER — OFFICE VISIT (OUTPATIENT)
Dept: ENDOCRINOLOGY | Facility: HOSPITAL | Age: 62
End: 2022-07-27
Payer: COMMERCIAL

## 2022-07-27 VITALS
WEIGHT: 289.8 LBS | BODY MASS INDEX: 51.35 KG/M2 | SYSTOLIC BLOOD PRESSURE: 124 MMHG | HEIGHT: 63 IN | DIASTOLIC BLOOD PRESSURE: 80 MMHG | HEART RATE: 82 BPM

## 2022-07-27 DIAGNOSIS — F32.1 CURRENT MODERATE EPISODE OF MAJOR DEPRESSIVE DISORDER WITHOUT PRIOR EPISODE (HCC): ICD-10-CM

## 2022-07-27 DIAGNOSIS — Z79.4 TYPE 2 DIABETES MELLITUS WITH STAGE 2 CHRONIC KIDNEY DISEASE, WITH LONG-TERM CURRENT USE OF INSULIN (HCC): Primary | ICD-10-CM

## 2022-07-27 DIAGNOSIS — E11.22 TYPE 2 DIABETES MELLITUS WITH STAGE 2 CHRONIC KIDNEY DISEASE, WITH LONG-TERM CURRENT USE OF INSULIN (HCC): Primary | ICD-10-CM

## 2022-07-27 DIAGNOSIS — N18.2 TYPE 2 DIABETES MELLITUS WITH STAGE 2 CHRONIC KIDNEY DISEASE, WITH LONG-TERM CURRENT USE OF INSULIN (HCC): Primary | ICD-10-CM

## 2022-07-27 DIAGNOSIS — J44.9 ASTHMA-COPD OVERLAP SYNDROME (HCC): ICD-10-CM

## 2022-07-27 LAB — SL AMB POCT HEMOGLOBIN AIC: 10.1 (ref ?–6.5)

## 2022-07-27 PROCEDURE — 83036 HEMOGLOBIN GLYCOSYLATED A1C: CPT | Performed by: PHYSICIAN ASSISTANT

## 2022-07-27 PROCEDURE — 3066F NEPHROPATHY DOC TX: CPT | Performed by: PHYSICIAN ASSISTANT

## 2022-07-27 PROCEDURE — 3046F HEMOGLOBIN A1C LEVEL >9.0%: CPT | Performed by: PHYSICIAN ASSISTANT

## 2022-07-27 PROCEDURE — 99214 OFFICE O/P EST MOD 30 MIN: CPT | Performed by: PHYSICIAN ASSISTANT

## 2022-07-27 NOTE — PATIENT INSTRUCTIONS
Monitor diet  Make sure to drink plenty water throughout the day  Continue metformin 1000 mg twice a day and glimepiride 8 mg with breakfast      Continue Lantus 28 units daily, and NovoLog 12 units with each meal      Start Trulicity 3 36 mg weekly  Contact the office with any concerns or questions  Follow-up in 3 months with lab work completed prior to visit

## 2022-07-27 NOTE — PROGRESS NOTES
Clive Kim 59 y o  female MRN: [id number]    Encounter: [id number]      Assessment/Plan     Assessment: This is a 59y o -year-old female with type 2 diabetes with neuropathy, chronic kidney disease, and hyperlipidemia  Plan:  1  Type 2 diabetes, insulin requiring:  Most recent hemoglobin A1c completed in office today was 10 1  Since last office visit she has received her prescription for Trulicity, but does not think she is using it correctly  She has also received her Dexcom supplies  Since her A1c has gone down and is in the process of using CGM and starting new therapy, will not make any adjustments to her medication  She will continue with Trulicity 0 97 mg weekly, Lantus 28 units daily, Humalog 12 units with each meal, metformin 500 mg 2 tablets twice a day and glimepiride 8 mg with breakfast   Once Dexcom is set up, would like to see a download in about 2 weeks  Follow-up in 3 months with lab work completed prior to visit  With her chronic medical conditions, difficulty with transportation and getting to appointments, I did recommend visit from complex case management to assist with patient's management of her diabetes, make sure she is taking her insulin appropriately properly utilizing her Dexcom so we can better manage her diabetes  Would also like a home evaluation      Patient is type 2 diabetic currently utilizing for more insulin injections a day  She is checking her blood sugar for more times a day, and adjusting insulin doses according to current glucose levels  Because of this, think could beneficial for her to utilize a Dexcom continuous glucose monitoring system for her glucose levels      2  Diabetic neuropathy:  Stable  Diabetic foot exam is up-to-date      3  History of stage 2 chronic kidney disease:  No recent lab work completed  Repeat CMP prior to next office visit      4  Hyperlipidemia:  Most recent lipid panel looked excellent  Continue with current medication  Repeat prior to next office visit  CC:  Type 2 diabetes follow-up    History of Present Illness     HPI:  Mattie Joy is a 59year old female with type 2 diabetes, insulin requiring for 7 year, hyperlipidemia for follow-up    She was placed on insulin in June 2021 when her hemoglobin A1c went up markedly   She admits to a poor diet   She can do very well at times but has "no discipline"   She reportedly did try Januvia and Jardiance in the past but had problems with side effects on these medications  She is on oral agents and insulin at home and takes glimepiride 8 mg with breakfast, metformin  mg 2 tablets twice a day, and Lantus insulin 28 units at bedtime, Humalog 12 units with each meal   Was started on Trulicity after last office visit, but does not think she is utilizing the pen appropriately  She admits to polyuria, polydipsia, polyphagia, nocturia 3-4 times a night, and blurry vision   She has unusual sensations of her feet with painful sensations of her feet   She denies chest pain but has shortness of breath with her lung disease   She is on oxygen 24 hours a day, recently has been having shortness of breath and was prescribed prednisone from pulmonology yesterday  She denies retinopathy, heart attack, stroke and claudication but does admit to neuropathy and nephropathy  States that her daughter passed away in January, and since then has been having some severe depression  Recently has also been having some significant fatigue  Whether this is from her pulmonary conditions, or depression it is hard to tell  Has followed up with [facility] November 2021, and has an upcoming appointment tomorrow      Hypoglycemic episodes: No never  H/o of hypoglycemia causing hospitalization or intervention such as glucagon injection  or ambulance call  No   Hypoglycemia symptoms: never had one  Treatment of hypoglycemia: would eat something sweet or sugar    Glucagon:No   Medic alert tag: recommended,Yes       The patient's last eye exam was at June 2022   The patient's last foot exam was in with podiatry, Dr Yolette Pierre last saw Podiatry several weeks ago and does every 3 months  Last diabetic foot exam completed in the office was November 2021      Blood Sugar/Glucometer/Pump/CGM review:  No blood sugar logs present at today's office visit, but does have an appointment tomorrow to get set up with her Dexcom      She has hyperlipidemia and takes atorvastatin 40 mg daily   She denies chest pain but has rare palpitations  Review of Systems   Constitutional: Positive for appetite change and fatigue  Negative for activity change and unexpected weight change  HENT: Negative for sore throat and trouble swallowing  Eyes: Negative for visual disturbance  Respiratory: Positive for shortness of breath (On portable oxygen)  Negative for chest tightness  Cardiovascular: Negative for chest pain, palpitations and leg swelling  Gastrointestinal: Negative for abdominal pain, constipation, diarrhea, nausea and vomiting  Endocrine: Positive for polydipsia, polyphagia and polyuria  Negative for cold intolerance and heat intolerance  Genitourinary: Negative for frequency  Nocturia   Musculoskeletal: Positive for gait problem (Utilizes wheelchair)  Skin: Negative for wound  Neurological: Positive for numbness ( bilateral feet)  Negative for dizziness, weakness and headaches  Psychiatric/Behavioral: Positive for dysphoric mood and sleep disturbance  The patient is not nervous/anxious          Historical Information   Past Medical History:   Diagnosis Date    Acute diverticulitis 5/2/2021    Alcohol abuse 5/21/2020    Anemia     Atrial fibrillation (HCC)     Cervical radiculopathy     CHF (congestive heart failure) (HCC)     Chronic low back pain     Chronic obstructive asthma ([facility] [address] ) 2/20/2018    Cigarette nicotine dependence without complication 82/52/4115    Quit 12/10/2019       Community acquired pneumonia 2020    Depression with anxiety 3/9/2014    Diabetes mellitus with hyperglycemia (HCC)     Elevated liver enzymes     GERD (gastroesophageal reflux disease)     Hypersomnolence 10/28/2020    Hypokalemia 2018    Paresthesia of upper extremity     Plantar fasciitis     Restless legs syndrome 2014    Sexual dysfunction     Sleep apnea, obstructive     Tenosynovitis of finger     Tinea corporis     Tobacco use 2018    Currently smoking 3-4 cigarettes daily    Trigger middle finger of left hand 2017    Trigger ring finger of left hand 2017    Weakness of upper extremity      Past Surgical History:   Procedure Laterality Date    ABDOMINAL SURGERY      CARPAL TUNNEL RELEASE Bilateral      SECTION      CHOLECYSTECTOMY      laparoscopic    ESOPHAGOGASTRODUODENOSCOPY N/A 12/3/2018    Procedure: ESOPHAGOGASTRODUODENOSCOPY (EGD) AND COLONOSCOPY;  Surgeon: Andrew Partida MD;  [facility];  Service: Gastroenterology    GASTRIC BYPASS      for morbid obesity with gilda en y    HERNIA REPAIR      HYSTERECTOMY      NH EXCIS PRIMARY GANGLION WRIST Left 2017    Procedure: LONG FINGER GANGLION CYST EXCISION;  Surgeon: Gracia Savage MD;  [facility];  Service: Orthopedics    NH INCISE FINGER TENDON SHEATH Left 2017    Procedure: Rheta [place], RING, TRIGGER FINGER RELEASE;  Surgeon: Gracia Savage MD;  [facility];  Service: Orthopedics    SHOULDER SURGERY Right      Social History   Social History     Substance and Sexual Activity   Alcohol Use Not Currently    Alcohol/week: 20 0 standard drinks    Types: 20 Cans of beer per week    Comment: about 6 [employer] light every night, stopped in      Social History     Substance and Sexual Activity   Drug Use No     Social History     Tobacco Use   Smoking Status Former Smoker    Packs/day: 0 25    Years: 29 00    Pack years: 7 25  Types: Cigarettes    Start date: 200    Quit date: 12/10/2019    Years since quittin 6   Smokeless Tobacco Never Used     Family History:   Family History   Problem Relation Age of Onset    Alzheimer's disease Mother     Atrial fibrillation Mother     Dementia Mother     Heart disease Mother         heart problem    Seizures Mother     Parkinsonism Father     Arthritis Father     Atrial fibrillation Father     Colon cancer Maternal Grandmother [de-identified]    Diabetes type II Maternal Grandmother     No Known Problems Brother     No Known Problems Daughter     No Known Problems Son     Cri-du-chat syndrome Daughter     Substance Abuse Neg Hx         mother,father    Mental illness Neg Hx         mother,father    Colon polyps Neg Hx        Meds/Allergies   Current Outpatient Medications   Medication Sig Dispense Refill    albuterol (2 5 mg/3 mL) 0 083 % nebulizer solution Take 3 mL (2 5 mg total) by nebulization every 6 (six) hours as needed for wheezing or shortness of breath 1080 mL 3    albuterol (ProAir HFA) 90 mcg/act inhaler Inhale 2 puffs every 6 (six) hours as needed for wheezing or shortness of breath 8 5 g 3    albuterol (PROVENTIL HFA,VENTOLIN HFA) 90 mcg/act inhaler Inhale 2 puffs every 4 (four) hours as needed for wheezing 1 Inhaler 4    ascorbic acid (VITAMIN C) 1000 MG tablet Take 1,000 mg by mouth daily        atorvastatin (LIPITOR) 40 mg tablet TAKE 1 TABLET BY MOUTH EVERY DAY 30 tablet 5    Blood Glucose Monitoring Suppl (Verified Person CONTOUR NEXT MONITOR) w/Device KIT by Does not apply route daily      Cholecalciferol 1000 units tablet Take 1,000 Units by mouth daily        Contour Next Test test strip USE TO TEST BLOOD SUGAR 3 TIMES A  strip 3    [employer] Lancets Thin 26G MISC USE 3 (THREE) TIMES A  each 5    cyanocobalamin (VITAMIN B-12) 100 mcg tablet Take by mouth daily      Dulaglutide (Trulicity) 5 55 MG/5 1FQ SOPN Inject 0 5 mL (0 75 mg total) under the skin once a week 2 mL 5    Eliquis 5 MG TAKE 1 TABLET BY MOUTH TWICE A DAY 60 tablet 5    escitalopram (LEXAPRO) 20 mg tablet TAKE 1 TABLET BY MOUTH EVERY DAY 90 tablet 0    flecainide (TAMBOCOR) 100 mg tablet Take 1 tablet (100 mg total) by mouth every 12 (twelve) hours 180 tablet 1    Flovent  MCG/ACT inhaler INHALE 2 PUFFS 2 (TWO) TIMES A DAY RINSE MOUTH AFTER USE  12 g 6    fluticasone (FLONASE) 50 mcg/act nasal spray 1 spray into each nostril 2 (two) times a day 1 Bottle 3    glycopyrrolate-formoterol (BEVESPI AEROSPHERE) 9-4 8 MCG/ACT inhaler Inhale 2 puffs 2 (two) times a day 10 7 g 5    insulin lispro (HumaLOG) 100 units/mL injection pen INJECT 12 UNITS UNDER THE SKIN 3 (THREE) TIMES A DAY WITH MEALS 15 mL 1    Insulin Pen Needle (BD Pen Needle Love 2nd Gen) 32G X 4 MM MISC Use daily at bedtime Use 4 new needles daily   400 each 3    Klor-Con M20 20 MEQ tablet TAKE 1 TABLET (20 MEQ TOTAL) BY MOUTH 2 (TWO) TIMES A DAY 60 tablet 5    Lantus SoloStar 100 units/mL injection pen INJECT 28 UNITS UNDER THE SKIN DAILY AT BEDTIME 15 mL 1    levalbuterol (XOPENEX) 1 25 mg/0 5 mL nebulizer solution Take 0 5 mL (1 25 mg total) by nebulization 2 (two) times a day 60 vial 0    loratadine (CLARITIN) 10 mg tablet Take by mouth      LORazepam (ATIVAN) 0 5 mg tablet TAKE 2 TABLETS BY MOUTH AT BEDTIME AND 1 EVERY 6 HOURS AS NEEDED FOR ANXIETY 90 tablet 0    Magnesium 400 MG CAPS Take by mouth 1 BID      metFORMIN (GLUCOPHAGE-XR) 500 mg 24 hr tablet TAKE 2 TABLETS BY MOUTH TWICE A DAY WITH FOOD 120 tablet 5    metolazone (ZAROXOLYN) 5 mg tablet Take 1 tablet (5 mg total) by mouth daily 5 tablet 0    metoprolol succinate (TOPROL-XL) 50 mg 24 hr tablet Take 1 tablet (50 mg total) by mouth daily 90 tablet 1    montelukast (SINGULAIR) 10 mg tablet Take 1 tablet (10 mg total) by mouth daily at bedtime 90 tablet 3    naloxone (NARCAN) 4 mg/0 1 mL nasal spray Administer 1 spray into a nostril   If breathing does not return to normal or if breathing difficulty resumes after 2-3 minutes, give another dose in the other nostril using a new spray  1 each 1    omeprazole (PriLOSEC) 40 MG capsule Take 1 capsule (40 mg total) by mouth daily 90 capsule 3    predniSONE 20 mg tablet Take 2 tablets (40 mg total) by mouth daily for 5 days 10 tablet 0    torsemide (DEMADEX) 20 mg tablet Take 3 tablets (60 mg total) by mouth 2 (two) times a day 180 tablet 5    traZODone (DESYREL) 150 mg tablet TAKE 1 TABLET BY MOUTH EVERYDAY AT BEDTIME 90 tablet 0    atorvastatin (LIPITOR) 80 mg tablet TAKE 1/2 TABLET DAILY (Patient not taking: No sig reported) 90 tablet 1    ferrous sulfate 220 (44 Fe) mg/5 mL solution TAKE 5 ML (220 MG TOTAL) BY MOUTH 2 (TWO) TIMES A DAY BEFORE MEALS (Patient not taking: No sig reported) 473 mL 2    mometasone (ELOCON) 0 1 % cream Apply topically daily for 7 days (Patient not taking: No sig reported) 45 g 0    nystatin (MYCOSTATIN) cream Apply topically 2 (two) times a day for 10 days 30 g 0    potassium chloride (K-DUR,KLOR-CON) 20 mEq tablet Take 1 tablet (20 mEq total) by mouth 2 (two) times a day for 3 days In addition to standing dose  (Patient not taking: No sig reported) 6 tablet 0     No current facility-administered medications for this visit  Allergies   Allergen Reactions    Iron Dextran Swelling    Januvia [Sitagliptin] Shortness Of Breath    Jardiance [Empagliflozin] Shortness Of Breath    Clonazepam Other (See Comments)     Unknown reaction    Codeine Itching and Other (See Comments)     itch;mary kay morphine,takes ultram @home    Adhesive [Medical Tape] Itching     itching    Effexor [Venlafaxine] Itching    Lactose - Food Allergy GI Intolerance    Oxycodone-Acetaminophen Anxiety    Oxycodone-Acetaminophen Itching and Rash     Other reaction(s):  Other (See Comments)  (PERCOCET) MILD RASH, not allergic to Acetaminophen        Objective   Vitals: Blood pressure 124/80, pulse 82, height 5' 3" (1 6 m), weight 131 kg (289 lb 12 8 oz), not currently breastfeeding  Physical Exam  Vitals and nursing note reviewed  Constitutional:       General: She is not in acute distress  Appearance: Normal appearance  She is not diaphoretic  HENT:      Head: Normocephalic and atraumatic  Eyes:      General: No scleral icterus  Extraocular Movements: Extraocular movements intact  Conjunctiva/sclera: Conjunctivae normal       Pupils: Pupils are equal, round, and reactive to light  Cardiovascular:      Rate and Rhythm: Normal rate and regular rhythm  Heart sounds: No murmur heard  Pulmonary:      Effort: Pulmonary effort is normal  No respiratory distress  Breath sounds: Decreased breath sounds present  No wheezing  Comments: On portable oxygen  Musculoskeletal:      Cervical back: Normal range of motion  Right lower leg: No edema  Left lower leg: No edema  Lymphadenopathy:      Cervical: No cervical adenopathy  Neurological:      Mental Status: She is alert and oriented to person, place, and time  Mental status is at baseline  Sensory: No sensory deficit  Gait: Gait abnormal (Utilizing wheelchair)  Psychiatric:         Mood and Affect: Mood normal          Behavior: Behavior normal          Thought Content: Thought content normal          The history was obtained from the review of the chart, patient      Lab Results:   Lab Results   Component Value Date/Time    Hemoglobin A1C 11 8 (A) 03/23/2022 01:18 PM    Hemoglobin A1C 11 4 (H) 11/01/2021 12:39 PM    WBC 11 00 (H) 02/10/2022 01:42 PM    White Blood Cell Count 12 3 (H) 11/01/2021 12:39 PM    White Blood Cell Count 11 9 (H) 11/01/2021 12:39 PM    White Blood Cell Count 12 9 (H) 09/24/2021 01:08 PM    White Blood Cell Count 13 0 (H) 09/24/2021 01:08 PM    Hemoglobin 12 0 02/10/2022 01:42 PM    Hemoglobin 11 4 11/01/2021 12:39 PM    Hemoglobin 11 4 11/01/2021 12:39 PM    Hemoglobin 12 1 09/24/2021 01:08 PM Hemoglobin 11 7 09/24/2021 01:08 PM    HCT 36 3 11/01/2021 12:39 PM    HCT 37 2 11/01/2021 12:39 PM    HCT 39 3 09/24/2021 01:08 PM    HCT 38 3 09/24/2021 01:08 PM    Hematocrit 41 6 02/10/2022 01:42 PM    MCV 80 (L) 02/10/2022 01:42 PM    MCV 80 11/01/2021 12:39 PM    MCV 81 11/01/2021 12:39 PM    MCV 79 09/24/2021 01:08 PM    MCV 78 (L) 09/24/2021 01:08 PM    Platelet Count 218 53/71/3567 12:39 PM    Platelet Count 255 86/48/8952 12:39 PM    Platelet Count 311 06/69/7776 01:08 PM    Platelet Count 426 54/10/5353 01:08 PM    Platelets 494 31/21/0195 01:42 PM    BUN 10 02/10/2022 01:42 PM    BUN 10 11/01/2021 12:39 PM    BUN 30 (H) 10/25/2021 12:35 PM    BUN 13 10/15/2021 10:41 AM    Potassium 3 9 02/10/2022 01:42 PM    Potassium 4 7 11/01/2021 12:39 PM    Potassium 3 3 (L) 10/25/2021 12:35 PM    Potassium 4 1 10/15/2021 10:41 AM    Chloride 92 (L) 02/10/2022 01:42 PM    Chloride 89 (L) 11/01/2021 12:39 PM    Chloride 69 (LL) 10/25/2021 12:35 PM    Chloride 88 (L) 10/15/2021 10:41 AM    CO2 39 (H) 02/10/2022 01:42 PM    CO2 33 (H) 11/01/2021 12:39 PM    CO2 44 (HH) 10/25/2021 12:35 PM    CO2 34 (H) 10/15/2021 10:41 AM    Creatinine 0 76 02/10/2022 01:42 PM    Creatinine 0 59 11/01/2021 12:39 PM    Creatinine 0 83 10/25/2021 12:35 PM    Creatinine 0 62 10/15/2021 10:41 AM    AST 16 02/10/2022 01:42 PM    AST 69 (H) 11/01/2021 12:39 PM    AST 30 09/24/2021 01:08 PM    ALT 32 02/10/2022 01:42 PM     (H) 11/01/2021 12:39 PM    ALT 57 (H) 09/24/2021 01:08 PM    Albumin 3 6 02/10/2022 01:42 PM    Albumin 4 0 11/01/2021 12:39 PM    Albumin 4 3 09/24/2021 01:08 PM    Globulin, Total 2 0 11/01/2021 12:39 PM    Globulin, Total 2 2 09/24/2021 01:08 PM    HDL 47 11/01/2021 12:39 PM    Triglycerides 117 11/01/2021 12:39 PM       Portions of the record may have been created with voice recognition software   Occasional wrong word or "sound a like" substitutions may have occurred due to the inherent limitations of voice recognition software  Read the chart carefully and recognize, using context, where substitutions have occurred

## 2022-07-28 ENCOUNTER — OFFICE VISIT (OUTPATIENT)
Dept: DIABETES SERVICES | Facility: HOSPITAL | Age: 62
End: 2022-07-28
Payer: COMMERCIAL

## 2022-07-28 VITALS — WEIGHT: 289 LBS | HEIGHT: 63 IN | BODY MASS INDEX: 51.21 KG/M2

## 2022-07-28 DIAGNOSIS — E11.22 TYPE 2 DIABETES MELLITUS WITH STAGE 2 CHRONIC KIDNEY DISEASE, WITH LONG-TERM CURRENT USE OF INSULIN (HCC): Primary | ICD-10-CM

## 2022-07-28 DIAGNOSIS — N18.2 TYPE 2 DIABETES MELLITUS WITH STAGE 2 CHRONIC KIDNEY DISEASE, WITH LONG-TERM CURRENT USE OF INSULIN (HCC): Primary | ICD-10-CM

## 2022-07-28 DIAGNOSIS — Z79.4 TYPE 2 DIABETES MELLITUS WITH STAGE 2 CHRONIC KIDNEY DISEASE, WITH LONG-TERM CURRENT USE OF INSULIN (HCC): Primary | ICD-10-CM

## 2022-07-28 PROCEDURE — 95249 CONT GLUC MNTR PT PROV EQP: CPT | Performed by: DIETITIAN, REGISTERED

## 2022-07-28 NOTE — PROGRESS NOTES
Dexcom G6 Personal Training    Met with [facility] for [facility] G6 personal training  Patient comes in today with there own unit to be trained on  Completed all aspects of training, including site selection on rotation, not infusing insulin near the sensor site, proper insertion technique, inserting codes into the , charging, waterproof sensor, range of 20ft, setting high low alarms that can be adjusted based on their preferences  I demonstrated training on our practice device, they put on their first sensor by themselves with no issue  Left my office today with sensor on and in warm up mode  Discussed being able to link the dexcom through their cell phone, Gloria Messina will do that on their own if they wish to  Discussed creating a [employer] account to be able to link and upload from home  Dexcom's phone number is in their paperwork, encouraged Glroia Messina to reach out to [facility] if they have any issues after hours, 24/7  Training completed, will call with questions  Also trained on use of her trulicity pen  Lab Results   Component Value Date    HGBA1C 10 1 (A) 07/27/2022       Lab Results   Component Value Date     09/22/2017    SODIUM 133 (L) 02/10/2022    K 3 9 02/10/2022    CL 92 (L) 02/10/2022    CO2 39 (H) 02/10/2022    AGAP 2 (L) 02/10/2022    BUN 10 02/10/2022    CREATININE 0 76 02/10/2022    GLUC 364 (H) 11/01/2021    GLUF 373 (H) 02/10/2022    CALCIUM 8 9 02/10/2022    AST 16 02/10/2022    ALT 32 02/10/2022    ALKPHOS 139 (H) 02/10/2022    PROT 6 6 09/22/2017    TP 7 4 02/10/2022    BILITOT 0 4 09/22/2017    TBILI 0 50 02/10/2022    EGFR 83 02/10/2022         Patient response to instruction    Comprehension: good  Motivation: good  Expected Compliance: good  Response to Teachback: 100%, demonstrated understanding    Thank you for referring your patient to [facility], it was a pleasure working with them today   Please feel free to call with any questions or concerns  Shadia Hsieh, [address]  [facility] [id number]

## 2022-07-28 NOTE — Clinical Note
[facility]- for your records, no action needed   Thanks ! -Evangelista Chen

## 2022-07-29 ENCOUNTER — PATIENT OUTREACH (OUTPATIENT)
Dept: FAMILY MEDICINE CLINIC | Facility: HOSPITAL | Age: 62
End: 2022-07-29

## 2022-07-29 NOTE — PROGRESS NOTES
Outpatient Care Management Note:    New care management referral received from endocrine  Care manager called Brian Villavicencio, introduced myself and the care management program   Brian Villavicencio states that her blood sugars average over 400  She did start using the dexcom meter and we discussed the advantages of not having to stick herself  She will be seeing the diabetic educator again on 8/17 to download her dexcom  Med review completed  Brian Villavicencio has not been taking her vitamins  She states she will start  She does fill weekly med boxes  She states she is taking her insulin as ordered  We discussed her diet  She feels that her main issue is pretzels and ramen noodles  We talked about decreasing the amount of carbohydrates she eats  We also discussed checking her blood sugar before she eats and then 2hours later to see the effect of certain foods on her sugars  We reviewed the plate method of meal planning  Brian Villavicencio denies drinking sugary drinks  She states she drinks diet soda, diet iced tea and water  We did discuss symptoms of high blood sugar  Brian Villavicencio states she has frequent urination and thirst  We also discussed the long term effects of elevated blood sugar on every body organ  Ivy states that she feels her life has lead to poor diet  She lost her dad in 2018; her boyfriend in 2019, her mom in 2020 and her daughter in 2022  She admits to being depressed  I did discuss our  and that she can assist with finding a counselor if she is interested  Brian Isaacakshat will consider it  Brian Isaacakshat stated that she was getting tired  She requested CM call back next week  She will get my contact information from the caller ID

## 2022-08-03 ENCOUNTER — PATIENT OUTREACH (OUTPATIENT)
Dept: FAMILY MEDICINE CLINIC | Facility: HOSPITAL | Age: 62
End: 2022-08-03

## 2022-08-03 NOTE — PROGRESS NOTES
Outpatient Care Management Note:    Voice mail message left for Paulina Rosas, with my contact information, requesting a call back

## 2022-08-07 DIAGNOSIS — I50.31 ACUTE DIASTOLIC CONGESTIVE HEART FAILURE (HCC): ICD-10-CM

## 2022-08-07 RX ORDER — POTASSIUM CHLORIDE 1500 MG/1
TABLET, EXTENDED RELEASE ORAL
Qty: 60 TABLET | Refills: 5 | Status: SHIPPED | OUTPATIENT
Start: 2022-08-07

## 2022-08-10 ENCOUNTER — PATIENT OUTREACH (OUTPATIENT)
Dept: FAMILY MEDICINE CLINIC | Facility: HOSPITAL | Age: 62
End: 2022-08-10

## 2022-08-10 NOTE — PROGRESS NOTES
Outpatient Care Management Note:    Care manager called Ivy  She was in good spirits today  She states that her blood sugars are now averaging in the 300's  She had been in the 400s  I congratulated her on making improvements  She states that she is trying to watch her diet  I encouraged her to try and decrease her snacking ie) use a small bowl to dispense pretzels instead of eating out of the bag  Ivy is using the dexcom and states it is much easier  She states she is taking her insulin, trulicity, and metformin as ordered  Ivy reviewed that she needs to schedule a follow up appt with pulmonary  We reviewed all upcoming appts  POLO did discuss a referral to social work to address her depression and loss  She will seriously consider it, but is not ready at this time  CM agreed to follow up in 2 weeks  Ivy has my contact information and will call with any questions

## 2022-08-15 ENCOUNTER — DOCUMENTATION (OUTPATIENT)
Dept: ENDOCRINOLOGY | Facility: HOSPITAL | Age: 62
End: 2022-08-15

## 2022-08-16 DIAGNOSIS — Z79.899 ENCOUNTER FOR LONG-TERM (CURRENT) DRUG USE: ICD-10-CM

## 2022-08-17 ENCOUNTER — OFFICE VISIT (OUTPATIENT)
Dept: DIABETES SERVICES | Facility: HOSPITAL | Age: 62
End: 2022-08-17
Payer: COMMERCIAL

## 2022-08-17 VITALS — WEIGHT: 289 LBS | HEIGHT: 63 IN | BODY MASS INDEX: 51.21 KG/M2

## 2022-08-17 DIAGNOSIS — E11.22 TYPE 2 DIABETES MELLITUS WITH STAGE 2 CHRONIC KIDNEY DISEASE, WITH LONG-TERM CURRENT USE OF INSULIN (HCC): Primary | ICD-10-CM

## 2022-08-17 DIAGNOSIS — Z79.4 TYPE 2 DIABETES MELLITUS WITH STAGE 2 CHRONIC KIDNEY DISEASE, WITH LONG-TERM CURRENT USE OF INSULIN (HCC): Primary | ICD-10-CM

## 2022-08-17 DIAGNOSIS — N18.2 TYPE 2 DIABETES MELLITUS WITH STAGE 2 CHRONIC KIDNEY DISEASE, WITH LONG-TERM CURRENT USE OF INSULIN (HCC): Primary | ICD-10-CM

## 2022-08-17 PROCEDURE — G0108 DIAB MANAGE TRN  PER INDIV: HCPCS | Performed by: DIETITIAN, REGISTERED

## 2022-08-17 RX ORDER — LORAZEPAM 0.5 MG/1
TABLET ORAL
Qty: 90 TABLET | Refills: 0 | Status: SHIPPED | OUTPATIENT
Start: 2022-08-17 | End: 2022-09-12 | Stop reason: SDUPTHER

## 2022-08-17 NOTE — PROGRESS NOTES
Diabetes DSME    HPI: Met with Heydi Acosta for DSME Initial visit  Here today for follow-up from her Dexcom personal teaching  She was not sure how she would do with changing her sensor on her own  She did a great job and was able to change 1 on her own  Her sensor will  in a few hours, she requested that we change it together today, no problem  She did a great job with this  Download reviewed together while in office, in goal range 4%, high 27%, very high 69%, no low blood sugar  Average glucose 292  Download shows significant rise in blood sugar with breakfast, stay elevated but consistent throughout the day and then blood sugars improved overnight  Lantus 28 units at bedtime, NovoLog 12 units with each meal   I reviewed the download with Marin Gonzalez her endocrinologist while in office, she will increase breakfast NovoLog to 15 units, all other doses remain the same  Also discussed some dietary changes, goal of 45 g of carbohydrate per meal   She typically eats at least twice that, large portions of rice and noodles  She will work on making cut backs  No follow-up needed at this time  Ht Readings from Last 1 Encounters:   22 5' 3" (1 6 m)     Wt Readings from Last 3 Encounters:   22 131 kg (289 lb)   22 131 kg (289 lb)   22 131 kg (289 lb 12 8 oz)        Body mass index is 51 19 kg/m²       Lab Results   Component Value Date    HGBA1C 10 1 (A) 2022    HGBA1C 11 8 (A) 2022    HGBA1C 11 4 (H) 2021       Lab Results   Component Value Date    CHOL 150 2017    CHOL 134 2017    CHOL 168 2016     Lab Results   Component Value Date    HDL 47 2021    HDL 62 10/17/2019    HDL 65 (H) 2018     Lab Results   Component Value Date    LDLCALC 38 2021    LDLCALC 57 10/17/2019    LDLCALC 61 2018     Lab Results   Component Value Date    TRIG 117 2021    TRIG 100 10/17/2019    TRIG 54 2018     No results found for: Refill policies:    • Allow 2-3 business days for refills; controlled substances may take longer.   • Contact your pharmacy at least 5 days prior to running out of medication and have them send an electronic request or submit request through the “request re CHOLHDL  No results found for: Wan Sanchez    Diabetes Education Record  Paulina Rosas received the following handouts: dexcom download      Patient response to instruction    Comprehensiongood  Motivationgood  Expected Compliancegood    Thank you for referring your patient to [facility], it was a pleasure working with them today  Please feel free to call with any questions or concerns      Shivani Echeverria, [address] [place] [address] 20  29 [facility] [id number] Depending on your insurance carrier, approval may take 3-10 days. It is highly recommended patients contact their insurance carrier directly to determine coverage.   If test is done without insurance authorization, patient may be responsible for the entire

## 2022-08-19 ENCOUNTER — TELEPHONE (OUTPATIENT)
Dept: CARDIOLOGY CLINIC | Facility: CLINIC | Age: 62
End: 2022-08-19

## 2022-08-19 NOTE — TELEPHONE ENCOUNTER
Patient to have tooth extraction 8/31/22  How many days can she hold Eliquis for procedure? Please advise

## 2022-08-19 NOTE — TELEPHONE ENCOUNTER
Advised pt  Verbally understood  She said her dentist said to call us for how long  She is having 2 teeth pulled  Advised 2 days prior is fine

## 2022-08-23 ENCOUNTER — PATIENT OUTREACH (OUTPATIENT)
Dept: FAMILY MEDICINE CLINIC | Facility: HOSPITAL | Age: 62
End: 2022-08-23

## 2022-08-23 NOTE — PROGRESS NOTES
Outpatient Care Management Note:    Voice mail message left for Eden Dean, with my contact information, requesting a call back

## 2022-08-30 ENCOUNTER — PATIENT OUTREACH (OUTPATIENT)
Dept: FAMILY MEDICINE CLINIC | Facility: HOSPITAL | Age: 62
End: 2022-08-30

## 2022-08-30 NOTE — PROGRESS NOTES
Outpatient Care Management Note:    Voice mail message left for Adrianne Gayle, with my contact information, requesting a call back   (2nd attempt)    Unable to reach letter mailed to patient

## 2022-08-30 NOTE — LETTER
Date: 08/30/22    Dear Dolores Ayala,   My name is Deshaun Tyler  I am a registered nurse care manager working with   [address]   I have not been able to reach you and would like to set a time that I can talk with you over the phone  My work is to help patients that have complex medical conditions get the care they need  This includes patients who may have been in the hospital or emergency room  Please call me with any questions you may have  I look forward to speaking with you    Sincerely,  Deshaun Tyler  [phone]  Outpatient Care Manager  Copy:  (primary care physician name and address)

## 2022-09-05 DIAGNOSIS — I48.91 ATRIAL FIBRILLATION, UNSPECIFIED TYPE (HCC): ICD-10-CM

## 2022-09-05 PROCEDURE — 3066F NEPHROPATHY DOC TX: CPT | Performed by: NURSE PRACTITIONER

## 2022-09-07 RX ORDER — FLECAINIDE ACETATE 100 MG/1
TABLET ORAL
Qty: 60 TABLET | Refills: 5 | Status: SHIPPED | OUTPATIENT
Start: 2022-09-07 | End: 2022-09-20

## 2022-09-09 ENCOUNTER — PATIENT OUTREACH (OUTPATIENT)
Dept: FAMILY MEDICINE CLINIC | Facility: HOSPITAL | Age: 62
End: 2022-09-09

## 2022-09-09 NOTE — PROGRESS NOTES
Outpatient Care Management Note:    Voice mail message received from [facility]  Voice mail message left for [facility], with my contact information, requesting a call back  Mattie called back  When I asked how she was doing, she stated " I don't know"  She informed CM that her dexcom is not working  She has not checked her blood sugar in 3 weeks  She states that she is taking her insulin and all medications  She has a glucometer  She will find her glucometer and will check her sugar ,  If it is very high, she will call endocrine  CM instructed Mattie to also call Ivonne Vega, diabetic education, regarding the dexcom  [facility] agreed to this plan  CM will follow up next week

## 2022-09-12 ENCOUNTER — PATIENT OUTREACH (OUTPATIENT)
Dept: FAMILY MEDICINE CLINIC | Facility: HOSPITAL | Age: 62
End: 2022-09-12

## 2022-09-12 DIAGNOSIS — Z79.899 ENCOUNTER FOR LONG-TERM (CURRENT) DRUG USE: ICD-10-CM

## 2022-09-12 NOTE — PROGRESS NOTES
Outpatient Care Management Note:    Voice mail message left for Balamaria de jesus Mcpherson, with my contact information, requesting a call back to follow up on her blood sugars

## 2022-09-14 ENCOUNTER — TELEPHONE (OUTPATIENT)
Dept: ENDOCRINOLOGY | Facility: HOSPITAL | Age: 62
End: 2022-09-14

## 2022-09-14 ENCOUNTER — PATIENT OUTREACH (OUTPATIENT)
Dept: FAMILY MEDICINE CLINIC | Facility: HOSPITAL | Age: 62
End: 2022-09-14

## 2022-09-14 RX ORDER — LORAZEPAM 0.5 MG/1
TABLET ORAL
Qty: 90 TABLET | Refills: 0 | Status: SHIPPED | OUTPATIENT
Start: 2022-09-14 | End: 2022-10-10 | Stop reason: SDUPTHER

## 2022-09-14 NOTE — PROGRESS NOTES
Outpatient Care Management Note:    Care manager called Ivy to follow up on her blood sugars  She states "I have been bad"  She has not checked her blood sugars  She does have a glucometer  She also has the number for the diabetic educator to call and schedule about her dexcom  CM voiced my concern that Joyce Ames is taking her insulin but we have no idea how her sugars are running  She agreed to check her blood sugar now and will call me back with the reading  She has my contact information  Ivy called back  Her blood sugar was 258 which is an improvement  CM reinforced that she should be checking her blood sugar 3-4 times per day  She has the number for diabetic education and will call  She will also call pulmonary to schedule her follow up  Initial assessment started  Ivy mentioned that she has had issues with diarrhea on and off  She is on metformin  CM reviewed that she should be taking metformin with meals  She will start this and see if her symptoms improve  If not, she will check with endocrine  Ivy admits to ongoing issues with shortness of breath with any exertion  She is on oxygen and has an inogen  CM reinforced the importance of following up with pulmonary to address her symptoms  She will call  Ivy requested to finish initial assessment on next outreach  She has my contact information and will call with any questions

## 2022-09-14 NOTE — TELEPHONE ENCOUNTER
Returned call, left message for her to call me back  I will try to trouble shoot it over the phone, if not she can make an appt to come in for me to help with it

## 2022-09-14 NOTE — TELEPHONE ENCOUNTER
Received call from patient  She is having issues with her Dexcom, she reports the  is not reading  Please call her when you have time

## 2022-09-15 DIAGNOSIS — G47.00 INSOMNIA, UNSPECIFIED TYPE: ICD-10-CM

## 2022-09-15 DIAGNOSIS — J44.9 CHRONIC OBSTRUCTIVE PULMONARY DISEASE, UNSPECIFIED COPD TYPE (HCC): ICD-10-CM

## 2022-09-15 DIAGNOSIS — I10 ESSENTIAL HYPERTENSION: ICD-10-CM

## 2022-09-15 DIAGNOSIS — F32.1 CURRENT MODERATE EPISODE OF MAJOR DEPRESSIVE DISORDER WITHOUT PRIOR EPISODE (HCC): ICD-10-CM

## 2022-09-15 DIAGNOSIS — J45.51 SEVERE PERSISTENT ASTHMA WITH ACUTE EXACERBATION: ICD-10-CM

## 2022-09-16 RX ORDER — TRAZODONE HYDROCHLORIDE 150 MG/1
TABLET ORAL
Qty: 90 TABLET | Refills: 0 | Status: SHIPPED | OUTPATIENT
Start: 2022-09-16

## 2022-09-16 RX ORDER — MONTELUKAST SODIUM 10 MG/1
TABLET ORAL
Qty: 90 TABLET | Refills: 3 | Status: SHIPPED | OUTPATIENT
Start: 2022-09-16

## 2022-09-16 RX ORDER — METOPROLOL SUCCINATE 50 MG/1
TABLET, EXTENDED RELEASE ORAL
Qty: 90 TABLET | Refills: 1 | Status: SHIPPED | OUTPATIENT
Start: 2022-09-16 | End: 2022-09-20 | Stop reason: SDUPTHER

## 2022-09-16 RX ORDER — ESCITALOPRAM OXALATE 20 MG/1
TABLET ORAL
Qty: 90 TABLET | Refills: 0 | Status: SHIPPED | OUTPATIENT
Start: 2022-09-16

## 2022-09-19 RX ORDER — METFORMIN HYDROCHLORIDE 500 MG/1
TABLET, EXTENDED RELEASE ORAL
Qty: 360 TABLET | Refills: 1 | Status: SHIPPED | OUTPATIENT
Start: 2022-09-19

## 2022-09-20 ENCOUNTER — OFFICE VISIT (OUTPATIENT)
Dept: CARDIOLOGY CLINIC | Facility: CLINIC | Age: 62
End: 2022-09-20
Payer: COMMERCIAL

## 2022-09-20 VITALS
SYSTOLIC BLOOD PRESSURE: 122 MMHG | HEIGHT: 63 IN | DIASTOLIC BLOOD PRESSURE: 76 MMHG | BODY MASS INDEX: 50.53 KG/M2 | OXYGEN SATURATION: 90 % | WEIGHT: 285.2 LBS

## 2022-09-20 DIAGNOSIS — J96.11 CHRONIC RESPIRATORY FAILURE WITH HYPOXIA (HCC): ICD-10-CM

## 2022-09-20 DIAGNOSIS — I50.32 CHRONIC DIASTOLIC CONGESTIVE HEART FAILURE (HCC): ICD-10-CM

## 2022-09-20 DIAGNOSIS — I27.20 PULMONARY HYPERTENSION (HCC): ICD-10-CM

## 2022-09-20 DIAGNOSIS — I48.0 PAROXYSMAL ATRIAL FIBRILLATION (HCC): Primary | ICD-10-CM

## 2022-09-20 DIAGNOSIS — I10 ESSENTIAL HYPERTENSION: ICD-10-CM

## 2022-09-20 PROCEDURE — 99214 OFFICE O/P EST MOD 30 MIN: CPT | Performed by: INTERNAL MEDICINE

## 2022-09-20 PROCEDURE — 93000 ELECTROCARDIOGRAM COMPLETE: CPT | Performed by: INTERNAL MEDICINE

## 2022-09-20 RX ORDER — METOPROLOL SUCCINATE 100 MG/1
100 TABLET, EXTENDED RELEASE ORAL DAILY
Qty: 30 TABLET | Refills: 11 | Status: SHIPPED | OUTPATIENT
Start: 2022-09-20

## 2022-09-26 ENCOUNTER — OFFICE VISIT (OUTPATIENT)
Dept: PULMONOLOGY | Facility: HOSPITAL | Age: 62
End: 2022-09-26
Payer: COMMERCIAL

## 2022-09-26 VITALS
OXYGEN SATURATION: 93 % | HEIGHT: 63 IN | RESPIRATION RATE: 16 BRPM | WEIGHT: 285 LBS | SYSTOLIC BLOOD PRESSURE: 138 MMHG | BODY MASS INDEX: 50.5 KG/M2 | TEMPERATURE: 98.9 F | HEART RATE: 107 BPM | DIASTOLIC BLOOD PRESSURE: 76 MMHG

## 2022-09-26 DIAGNOSIS — J96.11 CHRONIC RESPIRATORY FAILURE WITH HYPOXIA (HCC): ICD-10-CM

## 2022-09-26 DIAGNOSIS — J44.9 ASTHMA-COPD OVERLAP SYNDROME (HCC): Primary | ICD-10-CM

## 2022-09-26 DIAGNOSIS — G47.33 OBSTRUCTIVE SLEEP APNEA: ICD-10-CM

## 2022-09-26 DIAGNOSIS — E66.01 MORBID OBESITY (HCC): ICD-10-CM

## 2022-09-26 DIAGNOSIS — R06.02 SHORTNESS OF BREATH: ICD-10-CM

## 2022-09-26 PROCEDURE — 99214 OFFICE O/P EST MOD 30 MIN: CPT | Performed by: PHYSICIAN ASSISTANT

## 2022-09-26 RX ORDER — BUDESONIDE 0.5 MG/2ML
0.5 INHALANT ORAL 2 TIMES DAILY
Qty: 60 ML | Refills: 3 | Status: SHIPPED | OUTPATIENT
Start: 2022-09-26

## 2022-09-26 RX ORDER — NEOMYCIN SULFATE, POLYMYXIN B SULFATE AND DEXAMETHASONE 3.5; 10000; 1 MG/ML; [USP'U]/ML; MG/ML
SUSPENSION/ DROPS OPHTHALMIC
COMMUNITY
Start: 2022-07-11

## 2022-09-26 NOTE — PROGRESS NOTES
Assessment & Plan:    1  Asthma-COPD overlap syndrome (HCC)  budesonide (Pulmicort) 0 5 mg/2 mL nebulizer solution    XR chest pa & lateral    Blood gas, arterial    CBC and differential    CBC and differential   2  Morbid obesity ([address] )  Ambulatory Referral to Weight Management   3  Shortness of breath  XR chest pa & lateral    Blood gas, arterial    Complete PFT with post Bronchodilator and Six Minute walk    CANCELED: Complete PFT with post Bronchodilator and Six Minute walk   4  Chronic respiratory failure with hypoxia (HCC)     5  Obstructive sleep apnea         Patient presenting for follow-up, notes gradual worsening of her breathing over time  She is potentially interested in endobronchial valve or lung transplant  We will obtain up-to-date complete PFTs with 6 minute walk and ABG  Advised patient for either option she would need to lose weight  Obesity also likely contributing to her symptoms  She is agreeable to a referral for weight management  She was unable to participate in pulmonary rehab the last time she was referred  If this continues to be the case, she would not likely be a good candidate for EBV or transplant  Regarding transplant, I am also unsure if she would have sufficient support system  She remains on 4 L of oxygen at all times  She is currently maintained on Flovent and Bevespi  We will replace Flovent with budesonide nebulizer treatments  Continue Singulair and albuterol inhaler or nebulizer treatments as needed  I will also add on CBC with diff for her blood work to see if she has any significant eosinophilia  She continues to uses CPAP and benefit from it  Previous compliance reports have demonstrated acceptable residual AHI less than 1  No imaging of the chest in over year, will have her go for CXR  She remains on diuretics per Cardiology    Subjective:     Patient ID: Pedro Luis Villeda is a 59 y o  female      Chief Complaint:  Viviane Joyner is a pleasant 69-year-old female with past medical history including asthma, COPD, chronic respiratory failure on 4 L of oxygen, WILMA, morbid obesity, CHF, pulmonary hypertension presenting for follow-up  She says that her breathing continues to get worse over time  She was on prednisone a few months ago and felt better while she was on it but notes her concerns about frequent treatment with steroids  She reports compliance with her diuretics  She does not use her rescue inhaler or nebulizer very often  The following portions of the patient's history were reviewed in this encounter and updated as appropriate: Past medical, social, surgical, family, allergies    Review of Systems   Constitutional: Positive for fatigue  Respiratory: Positive for cough, shortness of breath and wheezing  Cardiovascular: Positive for leg swelling  All other systems reviewed and are negative  Objective:  Vitals:    09/26/22 1415 09/26/22 1417   BP: 138/76    BP Location: Left arm    Patient Position: Sitting    Cuff Size: Adult    Pulse: (!) 107    Resp: 16    Temp: 98 9 °F (37 2 °C)    TempSrc: Tympanic    SpO2: 93% 93%   Weight: 129 kg (285 lb)    Height: 5' 3" (1 6 m)        Physical Exam  Vitals and nursing note reviewed  Constitutional:       General: She is not in acute distress  Appearance: She is well-developed  She is obese  She is not diaphoretic  HENT:      Head: Normocephalic and atraumatic  Right Ear: External ear normal       Left Ear: External ear normal       Nose: Nose normal    Eyes:      General: No scleral icterus  Neck:      Trachea: No tracheal deviation  Cardiovascular:      Rate and Rhythm: Tachycardia present  Pulmonary:      Effort: Pulmonary effort is normal  No respiratory distress  Breath sounds: No stridor  No wheezing or rales  Comments: Decreased breath sounds  Musculoskeletal:      Right lower leg: Edema present  Left lower leg: Edema present  Skin:     General: Skin is warm and dry     Neurological: Mental Status: She is alert and oriented to person, place, and time  Cranial Nerves: No cranial nerve deficit  Psychiatric:         Behavior: Behavior normal          Thought Content:  Thought content normal          Judgment: Judgment normal          Lab Review:   Office Visit on 07/27/2022   Component Date Value    Hemoglobin A1C 07/27/2022 10 1 (A)

## 2022-09-27 ENCOUNTER — HOSPITAL ENCOUNTER (OUTPATIENT)
Dept: PULMONOLOGY | Facility: HOSPITAL | Age: 62
Discharge: HOME | End: 2022-09-27
Payer: COMMERCIAL

## 2022-09-27 ENCOUNTER — TELEPHONE (OUTPATIENT)
Dept: PULMONOLOGY | Facility: HOSPITAL | Age: 62
End: 2022-09-27

## 2022-09-27 ENCOUNTER — HOSPITAL ENCOUNTER (OUTPATIENT)
Dept: RADIOLOGY | Facility: HOSPITAL | Age: 62
Discharge: HOME | End: 2022-09-27
Payer: COMMERCIAL

## 2022-09-27 DIAGNOSIS — J44.9 ASTHMA-COPD OVERLAP SYNDROME (HCC): ICD-10-CM

## 2022-09-27 DIAGNOSIS — R06.02 SHORTNESS OF BREATH: ICD-10-CM

## 2022-09-27 LAB
ARTERIAL PATENCY WRIST A: ABNORMAL
BASE EXCESS BLDA CALC-SCNC: 6 MMOL/L (ref -2–3)
CA-I BLD-SCNC: 1.14 MMOL/L (ref 1.12–1.32)
DS:DELIVERY SYSTEM: ABNORMAL
FIO2 GAS DIL.REBREATH: 36 L
GLUCOSE SERPL-MCNC: 343 MG/DL (ref 65–140)
HCO3 BLDA-SCNC: 31.6 MMOL/L (ref 22–28)
HCT VFR BLD CALC: 37 % (ref 34.8–46.1)
HGB BLDA-MCNC: 12.6 G/DL (ref 11.5–15.4)
PCO2 BLD: 33 MMOL/L (ref 21–32)
PCO2 BLD: 48.1 MM HG (ref 36–44)
PH BLD: 7.42 [PH] (ref 7.35–7.45)
PO2 BLD: 76 MM HG (ref 75–129)
POTASSIUM BLD-SCNC: 4.1 MMOL/L (ref 3.5–5.3)
SAMPLE SITE: ABNORMAL
SAO2 % BLD FROM PO2: 95 % (ref 60–85)
SODIUM BLD-SCNC: 134 MMOL/L (ref 136–145)
SPECIMEN SOURCE: ABNORMAL

## 2022-09-27 PROCEDURE — 85014 HEMATOCRIT: CPT

## 2022-09-27 PROCEDURE — 82803 BLOOD GASES ANY COMBINATION: CPT

## 2022-09-27 PROCEDURE — 82947 ASSAY GLUCOSE BLOOD QUANT: CPT

## 2022-09-27 PROCEDURE — 36600 WITHDRAWAL OF ARTERIAL BLOOD: CPT

## 2022-09-27 PROCEDURE — 71046 X-RAY EXAM CHEST 2 VIEWS: CPT

## 2022-09-27 PROCEDURE — 84295 ASSAY OF SERUM SODIUM: CPT

## 2022-09-27 PROCEDURE — 82330 ASSAY OF CALCIUM: CPT

## 2022-09-27 PROCEDURE — 84132 ASSAY OF SERUM POTASSIUM: CPT

## 2022-09-27 NOTE — TELEPHONE ENCOUNTER
Please call patient and let her know her blood sugar was 343, so she should call her endocrinologist

## 2022-09-28 ENCOUNTER — OFFICE VISIT (OUTPATIENT)
Dept: DIABETES SERVICES | Facility: HOSPITAL | Age: 62
End: 2022-09-28

## 2022-09-28 ENCOUNTER — PATIENT OUTREACH (OUTPATIENT)
Dept: FAMILY MEDICINE CLINIC | Facility: HOSPITAL | Age: 62
End: 2022-09-28

## 2022-09-28 DIAGNOSIS — N18.2 TYPE 2 DIABETES MELLITUS WITH STAGE 2 CHRONIC KIDNEY DISEASE, WITH LONG-TERM CURRENT USE OF INSULIN (HCC): Primary | ICD-10-CM

## 2022-09-28 DIAGNOSIS — E11.22 TYPE 2 DIABETES MELLITUS WITH STAGE 2 CHRONIC KIDNEY DISEASE, WITH LONG-TERM CURRENT USE OF INSULIN (HCC): Primary | ICD-10-CM

## 2022-09-28 DIAGNOSIS — Z79.4 TYPE 2 DIABETES MELLITUS WITH STAGE 2 CHRONIC KIDNEY DISEASE, WITH LONG-TERM CURRENT USE OF INSULIN (HCC): Primary | ICD-10-CM

## 2022-09-28 NOTE — PROGRESS NOTES
Outpatient Care Management Note:    Care manager called Ivy and congratulated her getting so many items completed  She saw cardiology and pulmonary  She is scheduled with diabetic education today to address dexcom meter  Ivy will bring all dexcom supplies with her to the appt  Ivy stopped her flecanide per cardiology and increased her metoprolol  She also stopped her flovent inhaler and started the budesonide nebulizer treatments per pulmonary  She is using her oxygen  She has not heard from weight management yet  POLO offered her the contact information  Ivy is currently out to lunch with relatives  CM will call back in a week  Ivy has my contact information and will call with any questions

## 2022-09-29 ENCOUNTER — OFFICE VISIT (OUTPATIENT)
Dept: FAMILY MEDICINE CLINIC | Facility: HOSPITAL | Age: 62
End: 2022-09-29
Payer: COMMERCIAL

## 2022-09-29 VITALS
TEMPERATURE: 98.5 F | OXYGEN SATURATION: 96 % | DIASTOLIC BLOOD PRESSURE: 64 MMHG | HEART RATE: 73 BPM | SYSTOLIC BLOOD PRESSURE: 104 MMHG

## 2022-09-29 DIAGNOSIS — K52.9 CHRONIC DIARRHEA: ICD-10-CM

## 2022-09-29 DIAGNOSIS — R22.32 AXILLARY LUMP, LEFT: Primary | ICD-10-CM

## 2022-09-29 PROCEDURE — 99214 OFFICE O/P EST MOD 30 MIN: CPT | Performed by: NURSE PRACTITIONER

## 2022-09-29 NOTE — PROGRESS NOTES
Name: Dean Tijerina      : 1960      MRN: [id number]  Encounter Provider: VICKY Brown  Encounter Date: 2022   [facility]      1  Axillary lump, left  Comments:  will evaluate further w/US as ordered & determine further plan pending result  Orders:  -     US extremity soft tissue; Future; Expected date: 2022    2  Chronic diarrhea  Comments:  evaluate further w/stool studies as ordered & consult GI for further eval  Orders:  -     Clostridium difficile toxin by PCR; Future  -     Ova and parasite examination; Future  -     Stool Enteric Bacterial Panel by PCR; Future  -     Calprotectin,Fecal; Future  -     Clostridium difficile toxin by PCR  -     Ova and parasite examination  -     Stool Enteric Bacterial Panel by PCR  -     Calprotectin,Fecal    update screening mammo in Nov as scheduled         Subjective      Sore lump under left arm  Started as a little stone and yesterday noticed it is larger and more sore  First noted stone a few weeks ago  No pain until yesterday  Denies fevers chills  Pain is localized to the area  Has not taken anything for the pain  Reports it only hurts when touched  Chest xray done yesterday  Also reports she is having severe diarrhea for several months  States she is not able to make it to the bathroom and has episodes of incontinence  Reports she must wear protection because of fecal incontinence  Reporting abdominal pain prior to diarrhea  Denies seeing blood in stool  Colonoscopy last year and due for repeat in 5 years  No f/u with GI since  No recent antibiotic use  Review of Systems   Constitutional: Negative for chills and fever  HENT: Negative for congestion, ear pain, hearing loss, postnasal drip and rhinorrhea  Eyes: Negative  Respiratory: Positive for shortness of breath (chronic sob  O2 dependent  )      Gastrointestinal: Positive for abdominal pain (prior to diarrhea) and diarrhea (for several months)  Negative for blood in stool, nausea and vomiting  Neurological: Negative for dizziness and facial asymmetry  Current Outpatient Medications on File Prior to Visit   Medication Sig    albuterol (2 5 mg/3 mL) 0 083 % nebulizer solution Take 3 mL (2 5 mg total) by nebulization every 6 (six) hours as needed for wheezing or shortness of breath    albuterol (ProAir HFA) 90 mcg/act inhaler Inhale 2 puffs every 6 (six) hours as needed for wheezing or shortness of breath    albuterol (PROVENTIL HFA,VENTOLIN HFA) 90 mcg/act inhaler Inhale 2 puffs every 4 (four) hours as needed for wheezing    atorvastatin (LIPITOR) 40 mg tablet TAKE 1 TABLET BY MOUTH EVERY DAY    Blood Glucose Monitoring Suppl ([employer] CONTOUR NEXT MONITOR) w/Device KIT by Does not apply route daily    budesonide (Pulmicort) 0 5 mg/2 mL nebulizer solution Take 2 mL (0 5 mg total) by nebulization 2 (two) times a day Rinse mouth after use   Contour Next Test test strip USE TO TEST BLOOD SUGAR 3 TIMES A DAY    [employer] Lancets Thin 26G MISC USE 3 (THREE) TIMES A DAY    Eliquis 5 MG TAKE 1 TABLET BY MOUTH TWICE A DAY    escitalopram (LEXAPRO) 20 mg tablet TAKE 1 TABLET BY MOUTH EVERY DAY    glycopyrrolate-formoterol (BEVESPI AEROSPHERE) 9-4 8 MCG/ACT inhaler Inhale 2 puffs 2 (two) times a day    insulin lispro (HumaLOG) 100 units/mL injection pen INJECT 12 UNITS UNDER THE SKIN 3 (THREE) TIMES A DAY WITH MEALS    Insulin Pen Needle (BD Pen Needle Love 2nd Gen) 32G X 4 MM MISC Use daily at bedtime Use 4 new needles daily    Klor-Con M20 20 MEQ tablet TAKE 1 TABLET BY MOUTH 2 TIMES A DAY      Lantus SoloStar 100 units/mL SOPN INJECT 28 UNITS UNDER THE SKIN DAILY AT BEDTIME    levalbuterol (XOPENEX) 1 25 mg/0 5 mL nebulizer solution Take 0 5 mL (1 25 mg total) by nebulization 2 (two) times a day    loratadine (CLARITIN) 10 mg tablet Take by mouth    LORazepam (ATIVAN) 0 5 mg tablet TAKE 2 TABLETS BY MOUTH AT BEDTIME AND 1 EVERY 6 HOURS AS NEEDED FOR ANXIETY    metFORMIN (GLUCOPHAGE-XR) 500 mg 24 hr tablet TAKE 2 TABLETS BY MOUTH TWICE A DAY WITH FOOD    metolazone (ZAROXOLYN) 5 mg tablet Take 1 tablet (5 mg total) by mouth daily    metoprolol succinate (TOPROL-XL) 100 mg 24 hr tablet Take 1 tablet (100 mg total) by mouth daily    montelukast (SINGULAIR) 10 mg tablet TAKE 1 TABLET BY MOUTH DAILY AT BEDTIME    naloxone (NARCAN) 4 mg/0 1 mL nasal spray Administer 1 spray into a nostril  If breathing does not return to normal or if breathing difficulty resumes after 2-3 minutes, give another dose in the other nostril using a new spray      omeprazole (PriLOSEC) 40 MG capsule Take 1 capsule (40 mg total) by mouth daily    torsemide (DEMADEX) 20 mg tablet Take 3 tablets (60 mg total) by mouth 2 (two) times a day    traZODone (DESYREL) 150 mg tablet TAKE 1 TABLET BY MOUTH EVERYDAY AT BEDTIME    Trulicity 7 98 NM/7 3VL SOPN INJECT 0 5 MILLILITERS UNDER THE SKIN ONE TIME PER WEEK    ascorbic acid (VITAMIN C) 1000 MG tablet Take 1,000 mg by mouth daily (Patient not taking: Reported on 9/29/2022)    Cholecalciferol 1000 units tablet Take 1,000 Units by mouth daily (Patient not taking: Reported on 9/29/2022)    cyanocobalamin (VITAMIN B-12) 100 mcg tablet Take by mouth daily (Patient not taking: Reported on 9/29/2022)    ferrous sulfate 220 (44 Fe) mg/5 mL solution TAKE 5 ML (220 MG TOTAL) BY MOUTH 2 (TWO) TIMES A DAY BEFORE MEALS (Patient not taking: Reported on 9/29/2022)    fluticasone (FLONASE) 50 mcg/act nasal spray 1 spray into each nostril 2 (two) times a day (Patient not taking: Reported on 9/29/2022)    Magnesium 400 MG CAPS Take by mouth 1 BID (Patient not taking: Reported on 9/29/2022)    mometasone (ELOCON) 0 1 % cream Apply topically daily for 7 days (Patient not taking: Reported on 9/29/2022)    neomycin-polymyxin-dexamethasone (MAXITROL) ophthalmic suspension INSTILL ONE DROP INTO THE LEFT EYE FOUR TIMES DAILY FOR 2 WEEKS (Patient not taking: Reported on 9/29/2022)    nystatin (MYCOSTATIN) cream Apply topically 2 (two) times a day for 10 days (Patient not taking: Reported on 9/29/2022)       Objective     /64   Pulse 73   Temp 98 5 °F (36 9 °C)   SpO2 96% Comment: 4Lo2    Physical Exam  Vitals reviewed  Constitutional:       General: She is not in acute distress  Appearance: Normal appearance  She is obese  HENT:      Head: Normocephalic  Mouth/Throat:      Mouth: Mucous membranes are moist       Pharynx: No posterior oropharyngeal erythema  Pulmonary:      Effort: Pulmonary effort is normal    Chest:   Breasts:      Left: Axillary adenopathy (firm palpable mass  no redness  reporting tenderness ) present  Musculoskeletal:      Cervical back: Normal range of motion and neck supple  Lymphadenopathy:      Cervical: No cervical adenopathy  Upper Body:      Left upper body: Axillary adenopathy (firm palpable mass  no redness  reporting tenderness ) present  Skin:     General: Skin is warm and dry  Neurological:      General: No focal deficit present  Mental Status: She is alert and oriented to person, place, and time     Psychiatric:         Mood and Affect: Mood normal          Behavior: Behavior normal          Lillian Livers, CRNP

## 2022-10-04 ENCOUNTER — HOSPITAL ENCOUNTER (OUTPATIENT)
Dept: NON INVASIVE DIAGNOSTICS | Age: 62
Discharge: HOME | End: 2022-10-04
Payer: COMMERCIAL

## 2022-10-04 VITALS
HEIGHT: 63 IN | SYSTOLIC BLOOD PRESSURE: 104 MMHG | WEIGHT: 285 LBS | DIASTOLIC BLOOD PRESSURE: 64 MMHG | BODY MASS INDEX: 50.5 KG/M2

## 2022-10-04 DIAGNOSIS — I50.32 CHRONIC DIASTOLIC CONGESTIVE HEART FAILURE (HCC): ICD-10-CM

## 2022-10-04 DIAGNOSIS — I27.20 PULMONARY HYPERTENSION (HCC): ICD-10-CM

## 2022-10-04 LAB
AORTIC ROOT: 3.1 CM
APICAL FOUR CHAMBER EJECTION FRACTION: 65 %
ASCENDING AORTA: 3.5 CM
E WAVE DECELERATION TIME: 150 MS
FRACTIONAL SHORTENING: 33 (ref 28–44)
INTERVENTRICULAR SEPTUM IN DIASTOLE (PARASTERNAL SHORT AXIS VIEW): 0.9 CM
INTERVENTRICULAR SEPTUM: 0.9 CM (ref 0.6–1.1)
LAAS-AP2: 25.4 CM2
LAAS-AP4: 20.6 CM2
LEFT ATRIUM AREA SYSTOLE SINGLE PLANE A4C: 20.8 CM2
LEFT ATRIUM SIZE: 4.3 CM
LEFT INTERNAL DIMENSION IN SYSTOLE: 3.3 CM (ref 2.1–4)
LEFT VENTRICLE DIASTOLIC VOLUME (MOD BIPLANE): 125 ML
LEFT VENTRICLE SYSTOLIC VOLUME (MOD BIPLANE): 41 ML
LEFT VENTRICULAR INTERNAL DIMENSION IN DIASTOLE: 4.9 CM (ref 3.5–6)
LEFT VENTRICULAR POSTERIOR WALL IN END DIASTOLE: 0.9 CM
LEFT VENTRICULAR STROKE VOLUME: 68 ML
LV EF: 67 %
LVSV (TEICH): 68 ML
MV E'TISSUE VEL-SEP: 10 CM/S
MV PEAK A VEL: 0.59 M/S
MV PEAK E VEL: 107 CM/S
MV STENOSIS PRESSURE HALF TIME: 44 MS
MV VALVE AREA P 1/2 METHOD: 5
RA PRESSURE ESTIMATED: 5 MMHG
RIGHT ATRIUM AREA SYSTOLE A4C: 14.7 CM2
RIGHT VENTRICLE ID DIMENSION: 3.6 CM
RV PSP: 48 MMHG
SL CV LEFT ATRIUM LENGTH A2C: 6.5 CM
SL CV LV EF: 60
SL CV PED ECHO LEFT VENTRICLE DIASTOLIC VOLUME (MOD BIPLANE) 2D: 112 ML
SL CV PED ECHO LEFT VENTRICLE SYSTOLIC VOLUME (MOD BIPLANE) 2D: 44 ML
TR MAX PG: 43 MMHG
TR PEAK VELOCITY: 3.3 M/S
TRICUSPID VALVE PEAK REGURGITATION VELOCITY: 3.26 M/S

## 2022-10-04 PROCEDURE — 93306 TTE W/DOPPLER COMPLETE: CPT

## 2022-10-04 PROCEDURE — 93306 TTE W/DOPPLER COMPLETE: CPT | Performed by: INTERNAL MEDICINE

## 2022-10-05 ENCOUNTER — PATIENT OUTREACH (OUTPATIENT)
Dept: FAMILY MEDICINE CLINIC | Facility: HOSPITAL | Age: 62
End: 2022-10-05

## 2022-10-05 NOTE — PROGRESS NOTES
Outpatient Care Management Note:    Voice mail message left for Mattie,with my contact information, requesting a call back

## 2022-10-10 ENCOUNTER — TELEPHONE (OUTPATIENT)
Dept: FAMILY MEDICINE CLINIC | Facility: HOSPITAL | Age: 62
End: 2022-10-10

## 2022-10-10 DIAGNOSIS — Z79.899 ENCOUNTER FOR LONG-TERM (CURRENT) DRUG USE: ICD-10-CM

## 2022-10-10 NOTE — TELEPHONE ENCOUNTER
Patient saw you last week for an axillary lump  States the lump as since gotten a bit bigger, and has "popped, and blood went everywhere"  Reports lump is still there, but has gotten much smaller since you examined her  Wants to know if she should have this looked at again, or proceed right away with US?

## 2022-10-12 RX ORDER — LORAZEPAM 0.5 MG/1
TABLET ORAL
Qty: 90 TABLET | Refills: 0 | Status: SHIPPED | OUTPATIENT
Start: 2022-10-12

## 2022-10-13 ENCOUNTER — PATIENT OUTREACH (OUTPATIENT)
Dept: FAMILY MEDICINE CLINIC | Facility: HOSPITAL | Age: 62
End: 2022-10-13

## 2022-10-13 ENCOUNTER — HOSPITAL ENCOUNTER (OUTPATIENT)
Dept: PULMONOLOGY | Facility: HOSPITAL | Age: 62
Discharge: HOME | End: 2022-10-13

## 2022-10-13 DIAGNOSIS — R06.02 SHORTNESS OF BREATH: ICD-10-CM

## 2022-10-13 NOTE — PROGRESS NOTES
Outpatient Care Management Note:    Care manager called Escobar Albarran  She states that her blood sugars are averaging between 200-400  CM instructed Mattie to have endocrine download her dexcom  Escobar Albarran states she may be able to do that today  She then said someone arrived at her house and she had to hang up

## 2022-10-13 NOTE — PROGRESS NOTES
Can you call patient to see if she can stop by to get her Dexcom download  Also can you please verify her medications as I would just might want to increase her Trulicity if she is taking that

## 2022-10-17 ENCOUNTER — PATIENT OUTREACH (OUTPATIENT)
Dept: FAMILY MEDICINE CLINIC | Facility: HOSPITAL | Age: 62
End: 2022-10-17

## 2022-10-17 DIAGNOSIS — E11.65 TYPE 2 DIABETES MELLITUS WITH HYPERGLYCEMIA, UNSPECIFIED WHETHER LONG TERM INSULIN USE (HCC): ICD-10-CM

## 2022-10-17 RX ORDER — PERPHENAZINE 16 MG/1
TABLET, FILM COATED ORAL
Qty: 100 STRIP | Refills: 3 | Status: SHIPPED | OUTPATIENT
Start: 2022-10-17

## 2022-10-17 NOTE — PROGRESS NOTES
Outpatient Care Management Note:    Voice mail message left for Gloria Messina, with my contact information, requesting a call back  Lake called back  She continues to have issues with daily diarrhea 1-2 times per day  CM strongly encouraged lake to get her stool specimens completed and schedule with GI  She will work to complete this within the week  Lake did not send her blood sugars to endocrine  I reviewed that endocrine was trying to contact her to review her sugars  She admits that she has been eating "stuff that she shouldn't eat"  Lake will work on her diet choices and will get her dexcom to endocrine to download  Once Lake completes these items, we will work to get her scheduled with weight management  Lake states that she does drive  She also has a friend that will drive her if needed  Lake has my contact information and will call with any questions  She requested CM follow up next week to see if completed above tasks

## 2022-10-24 ENCOUNTER — PATIENT OUTREACH (OUTPATIENT)
Dept: FAMILY MEDICINE CLINIC | Facility: HOSPITAL | Age: 62
End: 2022-10-24

## 2022-10-24 NOTE — PROGRESS NOTES
Yes I will have access to her Dexcom when she comes in the office  At that time I will get a better idea of what blood sugars are doing and make adjustments to her medications

## 2022-10-24 NOTE — PROGRESS NOTES
Outpatient Care Management Note:    Care manager called Ivy  She states that her diarrhea symptoms are much improved  She is going tomorrow for her blood work and will request stool specimen containers  She is aware that she still needs to schedule with GI  I gave her the contact information and she agreed to call  Ivy states her blood sugars are running in the 200s in the evenings but averaging between 300-400 during the day  She has not gotten to endocrine to have her dexcom downloaded  I strongly encouraged her to do this, so they can review her sugars and make adjustments  Ivy states that she is scheduled with endocrine on 11/2  CM reinforced that she needs to attend this appt  CM asked to completed initial assessment  Ivy states that she had to hang up  CM will attempt to complete it on next outreach  Ivy has my contact information and will call with any questions

## 2022-10-26 LAB
ALBUMIN SERPL-MCNC: 4.3 G/DL (ref 3.8–4.8)
ALBUMIN/CREAT UR: 18 MG/G CREAT (ref 0–29)
ALBUMIN/GLOB SERPL: 2 {RATIO} (ref 1.2–2.2)
ALP SERPL-CCNC: 166 IU/L (ref 44–121)
ALT SERPL-CCNC: 45 IU/L (ref 0–32)
AST SERPL-CCNC: 31 IU/L (ref 0–40)
BILIRUB SERPL-MCNC: 0.3 MG/DL (ref 0–1.2)
BUN SERPL-MCNC: 10 MG/DL (ref 8–27)
BUN SERPL-MCNC: 10 MG/DL (ref 8–27)
BUN/CREAT SERPL: 16 (ref 12–28)
BUN/CREAT SERPL: 16 (ref 12–28)
CALCIUM SERPL-MCNC: 9.2 MG/DL (ref 8.7–10.3)
CALCIUM SERPL-MCNC: 9.3 MG/DL (ref 8.7–10.3)
CHLORIDE SERPL-SCNC: 92 MMOL/L (ref 96–106)
CHLORIDE SERPL-SCNC: 93 MMOL/L (ref 96–106)
CHOLEST SERPL-MCNC: 112 MG/DL (ref 100–199)
CHOLEST/HDLC SERPL: 2.3 RATIO (ref 0–4.4)
CO2 SERPL-SCNC: 32 MMOL/L (ref 20–29)
CO2 SERPL-SCNC: 34 MMOL/L (ref 20–29)
CREAT SERPL-MCNC: 0.62 MG/DL (ref 0.57–1)
CREAT SERPL-MCNC: 0.63 MG/DL (ref 0.57–1)
CREAT UR-MCNC: 71 MG/DL
EGFR: 100 ML/MIN/1.73
EGFR: 100 ML/MIN/1.73
EST. AVERAGE GLUCOSE BLD GHB EST-MCNC: 226 MG/DL
GLOBULIN SER-MCNC: 2.1 G/DL (ref 1.5–4.5)
GLUCOSE SERPL-MCNC: 214 MG/DL (ref 70–99)
GLUCOSE SERPL-MCNC: 215 MG/DL (ref 70–99)
HBA1C MFR BLD: 9.5 % (ref 4.8–5.6)
HDLC SERPL-MCNC: 48 MG/DL
LDLC SERPL CALC-MCNC: 42 MG/DL (ref 0–99)
MICROALBUMIN UR-MCNC: 12.7 UG/ML
POTASSIUM SERPL-SCNC: 4.6 MMOL/L (ref 3.5–5.2)
POTASSIUM SERPL-SCNC: 4.6 MMOL/L (ref 3.5–5.2)
PROT SERPL-MCNC: 6.4 G/DL (ref 6–8.5)
SL AMB T4, FREE (DIRECT): 1.25 NG/DL (ref 0.82–1.77)
SL AMB VLDL CHOLESTEROL CALC: 22 MG/DL (ref 5–40)
SODIUM SERPL-SCNC: 139 MMOL/L (ref 134–144)
SODIUM SERPL-SCNC: 140 MMOL/L (ref 134–144)
TRIGL SERPL-MCNC: 126 MG/DL (ref 0–149)
TSH SERPL DL<=0.005 MIU/L-ACNC: 4.6 UIU/ML (ref 0.45–4.5)

## 2022-11-01 ENCOUNTER — HOSPITAL ENCOUNTER (OUTPATIENT)
Dept: MAMMOGRAPHY | Facility: CLINIC | Age: 62
Discharge: HOME | End: 2022-11-01

## 2022-11-01 ENCOUNTER — HOSPITAL ENCOUNTER (OUTPATIENT)
Dept: ULTRASOUND IMAGING | Facility: CLINIC | Age: 62
Discharge: HOME | End: 2022-11-01

## 2022-11-01 ENCOUNTER — PATIENT OUTREACH (OUTPATIENT)
Dept: FAMILY MEDICINE CLINIC | Facility: HOSPITAL | Age: 62
End: 2022-11-01

## 2022-11-01 DIAGNOSIS — R22.32 LOCALIZED SWELLING, MASS AND LUMP, LEFT UPPER LIMB: ICD-10-CM

## 2022-11-01 NOTE — PROGRESS NOTES
Outpatient Care Management Note:    Care manager called Yumi Massey  She was still in bed  I congratulated her on completing her endocrine blood work  We reviewed upcoming appts  Yumi Massey will bring her dexcom and reader to her endocrine appt, so they can download her sugars  She states that they are still ranging from 200-400  Yumi Massey has not completed her stool cultures  She states that she has containers but has "just been lazy"  She is still having issues with diarrhea  I strongly encouraged her to complete the cultures so we can be sure she does not have an infection  I reminded her that she needs to schedule with GI  We did not complete initial assessment, since I woke Mattie up  We will attempt to complete it on our next call  Yumi Massey has my contact information and will call with any questions

## 2022-11-02 ENCOUNTER — OFFICE VISIT (OUTPATIENT)
Dept: ENDOCRINOLOGY | Facility: HOSPITAL | Age: 62
End: 2022-11-02

## 2022-11-02 VITALS
SYSTOLIC BLOOD PRESSURE: 108 MMHG | DIASTOLIC BLOOD PRESSURE: 62 MMHG | BODY MASS INDEX: 50.85 KG/M2 | HEIGHT: 63 IN | HEART RATE: 83 BPM | WEIGHT: 287 LBS

## 2022-11-02 DIAGNOSIS — Z79.4 TYPE 2 DIABETES MELLITUS WITH STAGE 2 CHRONIC KIDNEY DISEASE, WITH LONG-TERM CURRENT USE OF INSULIN (HCC): Primary | ICD-10-CM

## 2022-11-02 DIAGNOSIS — E11.22 TYPE 2 DIABETES MELLITUS WITH STAGE 2 CHRONIC KIDNEY DISEASE, WITHOUT LONG-TERM CURRENT USE OF INSULIN (HCC): ICD-10-CM

## 2022-11-02 DIAGNOSIS — N18.2 TYPE 2 DIABETES MELLITUS WITH STAGE 2 CHRONIC KIDNEY DISEASE, WITH LONG-TERM CURRENT USE OF INSULIN (HCC): Primary | ICD-10-CM

## 2022-11-02 DIAGNOSIS — R79.89 ELEVATED TSH: ICD-10-CM

## 2022-11-02 DIAGNOSIS — N18.2 TYPE 2 DIABETES MELLITUS WITH STAGE 2 CHRONIC KIDNEY DISEASE, WITHOUT LONG-TERM CURRENT USE OF INSULIN (HCC): ICD-10-CM

## 2022-11-02 DIAGNOSIS — E11.22 TYPE 2 DIABETES MELLITUS WITH STAGE 2 CHRONIC KIDNEY DISEASE, WITH LONG-TERM CURRENT USE OF INSULIN (HCC): Primary | ICD-10-CM

## 2022-11-02 RX ORDER — SEMAGLUTIDE 1.34 MG/ML
0.5 INJECTION, SOLUTION SUBCUTANEOUS WEEKLY
Qty: 4.5 ML | Refills: 1 | Status: SHIPPED | OUTPATIENT
Start: 2022-11-02

## 2022-11-02 NOTE — PATIENT INSTRUCTIONS
Monitor diet  Make sure to drink plenty water throughout the day  Continue metformin 1000 mg twice a day and glimepiride 8 mg with breakfast      Continue Lantus 28 units daily  Change Novolog to 20 units with breakfast, and continue 12 units at lunch and dinner  Once Trulicity is finished, switch to Ozempic 0 5 mg weekly  Follow up with diabetes education  Contact the office with any concerns or questions  Follow-up in 3 months with lab work completed prior to visit

## 2022-11-02 NOTE — PROGRESS NOTES
Wes Parish 59 y o  female MRN: [id number]    Encounter: [id number]      Assessment/Plan     Assessment: This is a 59y o -year-old female with type 2 diabetes with neuropathy, chronic kidney disease, and hyperlipidemia  Plan:  1  Type 2 diabetes, insulin requiring:  Most recent hemoglobin A1c was 9 5  Review of her Dexcom the biggest discrepancy continues to be her breakfast   She needs to make dietary changes at this time  In the meantime will increase her Humalog to 20 units with breakfast   She will continue with 28 units of Lantus daily and Humalog 12 units with lunch and dinner  I also asked her to follow-up with [facility] to help with food discrepancies  She has been having difficulty with the Trulicity pens  Since she is using insulin pens, will switch to Ozempic 0 5 mg weekly  She can finish up her Trulicity in the meantime  Continue to utilize the Dexcom to monitor glucose levels  If with all the changes there is any concerns with hypoglycemia, please contact the office  Follow-up in 3 months with lab work completed prior to visit      Patient is type 2 diabetic currently utilizing 4 or more insulin injections a day   She is checking her blood sugar for more times a day, and adjusting insulin doses according to current glucose levels   Because of this, it would be beneficial for her to continue utilizing a Dexcom continuous glucose monitor      2  Diabetic neuropathy:  Stable  Diabetic foot exam completed today      3  History of stage 2 chronic kidney disease:  Kidney functions stable  Chlorides are low but stable      4  Hyperlipidemia:  Most recent lipid panel looked excellent   Continue with current medication  Repeat prior to next office visit      CC:  Type 2 diabetes follow-up    History of Present Illness     HPI:  Mattie ENRICO Joy is a 59year old female with type 2 diabetes, insulin requiring for 7 year, hyperlipidemia for follow-up    She was placed on insulin in June 2021 when her hemoglobin A1c went up markedly   She admits to a poor diet   She can do very well at times but has "no discipline"   She reportedly did try Januvia and Jardiance in the past but had problems with side effects on these medications  She is on oral agents and insulin at home and takes glimepiride 8 mg with breakfast, metformin  mg 2 tablets twice a day, and Lantus insulin 28 units at bedtime, Humalog 17 units with breakfast and 12 units with lunch and dinner plus sliding scale  Has been having difficulty utilizing her Trulicity injection pens  At times is not feel like she is getting the injection at all  She admits to polyuria, polydipsia, polyphagia, nocturia 3-4 times a night, and blurry vision   She has unusual sensations of her feet with painful sensations of her feet   She denies chest pain but has shortness of breath with her lung disease   She is on oxygen 24 hours a day  She denies retinopathy, heart attack, stroke and claudication but does admit to neuropathy and nephropathy  Continues to have some depression  No lifestyle changes since last office visit  Does admit to eating Jace caal for breakfast      Has followed up with [facility] November 2021      Hypoglycemic episodes: No never  H/o of hypoglycemia causing hospitalization or intervention such as glucagon injection  or ambulance call  No   Hypoglycemia symptoms: never had one  Treatment of hypoglycemia: would eat something sweet or sugar  Glucagon:No   Medic alert tag: recommended,Yes       The patient's last eye exam was at June 2022   The patient's last foot exam was in with podiatry, Dr Charlotte Simons last saw Podiatry several weeks ago and does every 3 months   Last diabetic foot exam completed in the office was November 2021      Blood Sugar/Glucometer/Pump/CGM review:  Currently utilizing a Dexcom    Download from October 20 through November 2, 2022 reveals an average glucose level of 313 with a standard deviation of 87  She is in target range 5% time and above target range 95% time  Overnight blood sugars trend down, and is typically doing well while she sleeps  Is sooner she eats breakfast there is a significant increasing glucose  Then glucose levels tend to just consistently run high        She has hyperlipidemia and takes atorvastatin 40 mg daily   She denies chest pain but has rare palpitations  Review of Systems   Constitutional: Positive for fatigue  Negative for activity change, appetite change and unexpected weight change  HENT: Negative for sore throat and trouble swallowing  Eyes: Negative for visual disturbance  Respiratory: Positive for shortness of breath (On portable oxygen)  Negative for chest tightness  Cardiovascular: Negative for chest pain, palpitations and leg swelling  Gastrointestinal: Negative for abdominal pain, constipation, diarrhea, nausea and vomiting  Endocrine: Positive for polydipsia, polyphagia and polyuria  Negative for cold intolerance and heat intolerance  Genitourinary: Negative for frequency  Nocturia   Musculoskeletal: Positive for gait problem (Utilizes wheelchair)  Skin: Negative for wound  Neurological: Positive for numbness ( bilateral feet)  Negative for dizziness, weakness and headaches  Psychiatric/Behavioral: Positive for dysphoric mood and sleep disturbance  The patient is not nervous/anxious  Historical Information   Past Medical History:   Diagnosis Date   • Acute diverticulitis 5/2/2021   • Alcohol abuse 5/21/2020   • Anemia    • Atrial fibrillation [facility])    • Cervical radiculopathy    • CHF (congestive heart failure) ([facility])    • Chronic low back pain    • Chronic obstructive asthma ([facility] ) 2/20/2018   • Cigarette nicotine dependence without complication 03/29/1493    Quit 12/10/2019      • Community acquired pneumonia 5/21/2020   • Depression with anxiety 3/9/2014   • Diabetes mellitus with hyperglycemia ([facility] )    • Elevated liver enzymes    • GERD (gastroesophageal reflux disease)    • Hypersomnolence 10/28/2020   • Hypokalemia 2018   • Paresthesia of upper extremity    • Plantar fasciitis    • Restless legs syndrome 2014   • Sexual dysfunction    • Sleep apnea, obstructive    • Tenosynovitis of finger    • Tinea corporis    • Tobacco use 2018    Currently smoking 3-4 cigarettes daily   • Trigger middle finger of left hand 2017   • Trigger ring finger of left hand 2017   • Weakness of upper extremity      Past Surgical History:   Procedure Laterality Date   • ABDOMINAL SURGERY     • CARPAL TUNNEL RELEASE Bilateral    •  SECTION     • CHOLECYSTECTOMY      laparoscopic   • ESOPHAGOGASTRODUODENOSCOPY N/A 12/3/2018    Procedure: ESOPHAGOGASTRODUODENOSCOPY (EGD) AND COLONOSCOPY;  Surgeon: Jackie Mccoy MD;  Location: [facility];  Service: Gastroenterology   • GASTRIC BYPASS      for morbid obesity with gilda en y   • HERNIA REPAIR     • HYSTERECTOMY     • OK EXCIS PRIMARY GANGLION WRIST Left 2017    Procedure: LONG FINGER GANGLION CYST EXCISION;  Surgeon: Krystal Salas MD;  [facility];  Service: Orthopedics   • OK INCISE FINGER TENDON SHEATH Left 2017    Procedure: Flavio Land, RING, TRIGGER FINGER RELEASE;  Surgeon: Krystal Salas MD;  Location: [facility];  Service: Orthopedics   • SHOULDER SURGERY Right      Social History   Social History     Substance and Sexual Activity   Alcohol Use Not Currently   • Alcohol/week: 20 0 standard drinks   • Types: 20 Cans of beer per week    Comment: about 6 coors light every night, stopped in      Social History     Substance and Sexual Activity   Drug Use No     Social History     Tobacco Use   Smoking Status Former Smoker   • Packs/day: 0 25   • Years: 29 00   • Pack years: 7 25   • Types: Cigarettes   • Start date: 200   • Quit date: 12/10/2019   • Years since quittin 8   Smokeless Tobacco Never Used     Family History: Family History   Problem Relation Age of Onset   • Alzheimer's disease Mother    • Atrial fibrillation Mother    • Dementia Mother    • Heart disease Mother         heart problem   • Seizures Mother    • Parkinsonism Father    • Arthritis Father    • Atrial fibrillation Father    • No Known Problems Daughter    • Cri-du-chat syndrome Daughter    • Colon cancer Maternal Grandmother [de-identified]   • Diabetes type II Maternal Grandmother    • No Known Problems Brother    • No Known Problems Son    • Substance Abuse Neg Hx         mother,father   • Mental illness Neg Hx         mother,father   • Colon polyps Neg Hx        Meds/Allergies   Current Outpatient Medications   Medication Sig Dispense Refill   • fluticasone (FLONASE) 50 mcg/act nasal spray 1 spray into each nostril 2 (two) times a day 1 Bottle 3   • insulin lispro (HumaLOG) 100 units/mL injection pen INJECT 12 UNITS UNDER THE SKIN 3 (THREE) TIMES A DAY WITH MEALS 30 mL 1   • loratadine (CLARITIN) 10 mg tablet Take by mouth     • metolazone (ZAROXOLYN) 5 mg tablet Take 1 tablet (5 mg total) by mouth daily 5 tablet 0   • montelukast (SINGULAIR) 10 mg tablet TAKE 1 TABLET BY MOUTH DAILY AT BEDTIME 90 tablet 3   • omeprazole (PriLOSEC) 40 MG capsule Take 1 capsule (40 mg total) by mouth daily 90 capsule 3   • Semaglutide,0 25 or 0 5MG/DOS, (Ozempic, 0 25 or 0 5 MG/DOSE,) 2 MG/1 5ML SOPN Inject 0 5 mg under the skin once a week 4 5 mL 1   • albuterol (2 5 mg/3 mL) 0 083 % nebulizer solution Take 3 mL (2 5 mg total) by nebulization every 6 (six) hours as needed for wheezing or shortness of breath 1080 mL 3   • albuterol (ProAir HFA) 90 mcg/act inhaler Inhale 2 puffs every 6 (six) hours as needed for wheezing or shortness of breath 8 5 g 3   • albuterol (PROVENTIL HFA,VENTOLIN HFA) 90 mcg/act inhaler Inhale 2 puffs every 4 (four) hours as needed for wheezing 1 Inhaler 4   • ascorbic acid (VITAMIN C) 1000 MG tablet Take 1,000 mg by mouth daily (Patient not taking: No sig reported)     • atorvastatin (LIPITOR) 40 mg tablet TAKE 1 TABLET BY MOUTH EVERY DAY 30 tablet 5   • Blood Glucose Monitoring Suppl ([employer] CONTOUR NEXT MONITOR) w/Device KIT by Does not apply route daily     • budesonide (Pulmicort) 0 5 mg/2 mL nebulizer solution Take 2 mL (0 5 mg total) by nebulization 2 (two) times a day Rinse mouth after use  60 mL 3   • Cholecalciferol 1000 units tablet Take 1,000 Units by mouth daily (Patient not taking: No sig reported)     • Contour Next Test test strip USE TO TEST BLOOD SUGAR 3 TIMES A  strip 3   • [employer] Lancets Thin 26G MISC USE 3 (THREE) TIMES A  each 5   • cyanocobalamin (VITAMIN B-12) 100 mcg tablet Take by mouth daily (Patient not taking: No sig reported)     • Eliquis 5 MG TAKE 1 TABLET BY MOUTH TWICE A DAY 60 tablet 5   • escitalopram (LEXAPRO) 20 mg tablet TAKE 1 TABLET BY MOUTH EVERY DAY 90 tablet 0   • ferrous sulfate 220 (44 Fe) mg/5 mL solution TAKE 5 ML (220 MG TOTAL) BY MOUTH 2 (TWO) TIMES A DAY BEFORE MEALS (Patient not taking: Reported on 9/29/2022) 473 mL 2   • glycopyrrolate-formoterol (BEVESPI AEROSPHERE) 9-4 8 MCG/ACT inhaler Inhale 2 puffs 2 (two) times a day 10 7 g 5   • Insulin Pen Needle (BD Pen Needle Love 2nd Gen) 32G X 4 MM MISC Use daily at bedtime Use 4 new needles daily   400 each 3   • Klor-Con M20 20 MEQ tablet TAKE 1 TABLET BY MOUTH 2 TIMES A DAY   60 tablet 5   • Lantus SoloStar 100 units/mL SOPN INJECT 28 UNITS UNDER THE SKIN DAILY AT BEDTIME 30 mL 1   • levalbuterol (XOPENEX) 1 25 mg/0 5 mL nebulizer solution Take 0 5 mL (1 25 mg total) by nebulization 2 (two) times a day 60 vial 0   • LORazepam (ATIVAN) 0 5 mg tablet TAKE 2 TABLETS BY MOUTH AT BEDTIME AND 1 EVERY 6 HOURS AS NEEDED FOR ANXIETY 90 tablet 0   • Magnesium 400 MG CAPS Take by mouth 1 BID (Patient not taking: No sig reported)     • metFORMIN (GLUCOPHAGE-XR) 500 mg 24 hr tablet TAKE 2 TABLETS BY MOUTH TWICE A DAY WITH FOOD 360 tablet 1   • metoprolol succinate (TOPROL-XL) 100 mg 24 hr tablet Take 1 tablet (100 mg total) by mouth daily 30 tablet 11   • mometasone (ELOCON) 0 1 % cream Apply topically daily for 7 days (Patient not taking: Reported on 9/29/2022) 45 g 0   • naloxone (NARCAN) 4 mg/0 1 mL nasal spray Administer 1 spray into a nostril  If breathing does not return to normal or if breathing difficulty resumes after 2-3 minutes, give another dose in the other nostril using a new spray  1 each 1   • neomycin-polymyxin-dexamethasone (MAXITROL) ophthalmic suspension INSTILL ONE DROP INTO THE LEFT EYE FOUR TIMES DAILY FOR 2 WEEKS (Patient not taking: No sig reported)     • nystatin (MYCOSTATIN) cream Apply topically 2 (two) times a day for 10 days (Patient not taking: Reported on 9/29/2022) 30 g 0   • torsemide (DEMADEX) 20 mg tablet Take 3 tablets (60 mg total) by mouth 2 (two) times a day 180 tablet 5   • traZODone (DESYREL) 150 mg tablet TAKE 1 TABLET BY MOUTH EVERYDAY AT BEDTIME 90 tablet 0   • Trulicity 5 02 PE/4 4IM SOPN INJECT 0 5 MILLILITERS UNDER THE SKIN ONE TIME PER WEEK 2 mL 5     No current facility-administered medications for this visit  Allergies   Allergen Reactions   • Iron Dextran Swelling   • Januvia [Sitagliptin] Shortness Of Breath   • Jardiance [Empagliflozin] Shortness Of Breath   • Clonazepam Other (See Comments)     Unknown reaction   • Codeine Itching and Other (See Comments)     itch;mary kay morphine,takes ultram @home   • Adhesive [Medical Tape] Itching     itching   • Effexor [Venlafaxine] Itching   • Lactose - Food Allergy GI Intolerance   • Oxycodone-Acetaminophen Anxiety   • Oxycodone-Acetaminophen Itching and Rash     Other reaction(s): Other (See Comments)  (PERCOCET) MILD RASH, not allergic to Acetaminophen        Objective   Vitals: Blood pressure 108/62, pulse 83, height 5' 3" (1 6 m), weight 130 kg (287 lb), not currently breastfeeding  Physical Exam  Vitals and nursing note reviewed     Constitutional:       General: She is not in acute distress  Appearance: Normal appearance  She is not diaphoretic  HENT:      Head: Normocephalic and atraumatic  Eyes:      General: No scleral icterus  Extraocular Movements: Extraocular movements intact  Conjunctiva/sclera: Conjunctivae normal       Pupils: Pupils are equal, round, and reactive to light  Cardiovascular:      Rate and Rhythm: Normal rate and regular rhythm  Pulses: Pulses are weak  Dorsalis pedis pulses are 1+ on the right side and 1+ on the left side  Posterior tibial pulses are 1+ on the right side and 1+ on the left side  Heart sounds: No murmur heard  Pulmonary:      Effort: Pulmonary effort is normal  No respiratory distress  Breath sounds: Normal breath sounds  No wheezing  Musculoskeletal:      Cervical back: Normal range of motion  Right lower leg: Edema present  Left lower leg: Edema present  Feet:      Right foot:      Skin integrity: Dry skin present  No ulcer, skin breakdown, erythema, warmth or callus  Left foot:      Skin integrity: Dry skin present  No ulcer, skin breakdown, erythema, warmth or callus  Lymphadenopathy:      Cervical: No cervical adenopathy  Neurological:      Mental Status: She is alert and oriented to person, place, and time  Mental status is at baseline  Sensory: No sensory deficit  Gait: Gait abnormal (Utilizing wheelchair)  Psychiatric:         Mood and Affect: Mood normal          Behavior: Behavior normal          Thought Content: Thought content normal      Patient's shoes and socks removed  Right Foot/Ankle   Right Foot Inspection  Skin Exam: skin normal, skin intact and dry skin  No warmth, no callus, no erythema, no maceration, no abnormal color, no pre-ulcer, no ulcer and no callus  Toe Exam: ROM and strength within normal limits   No tenderness, erythema and  no right toe deformity    Sensory   Vibration: diminished  Proprioception: intact  Monofilament testing: diminished    Vascular  Capillary refills: < 3 seconds  The right DP pulse is 1+  The right PT pulse is 1+  Left Foot/Ankle  Left Foot Inspection  Skin Exam: skin normal, skin intact and dry skin  No warmth, no erythema, no maceration, normal color, no pre-ulcer, no ulcer and no callus  Toe Exam: ROM and strength within normal limits  No tenderness, no erythema and no left toe deformity  Sensory   Vibration: diminished  Proprioception: intact  Monofilament testing: diminished    Vascular  Capillary refills: < 3 seconds  The left DP pulse is 1+  The left PT pulse is 1+  Assign Risk Category  Deformity present  Loss of protective sensation  Weak pulses  Risk: 2      The history was obtained from the review of the chart, patient      Lab Results:   Lab Results   Component Value Date/Time    Hemoglobin A1C 9 5 (H) 10/25/2022 10:25 AM    Hemoglobin A1C 10 1 (A) 07/27/2022 01:07 PM    Hemoglobin A1C 11 8 (A) 03/23/2022 01:18 PM    WBC 11 00 (H) 02/10/2022 01:42 PM    Hemoglobin 12 0 02/10/2022 01:42 PM    Hgb, i-STAT 12 6 09/27/2022 01:13 PM    Hematocrit 41 6 02/10/2022 01:42 PM    Hct, i-STAT 37 09/27/2022 01:13 PM    MCV 80 (L) 02/10/2022 01:42 PM    Platelets 161 73/72/9753 01:42 PM    BUN 10 10/25/2022 10:25 AM    BUN 10 10/25/2022 10:24 AM    BUN 10 02/10/2022 01:42 PM    Potassium 4 6 10/25/2022 10:25 AM    Potassium 4 6 10/25/2022 10:24 AM    Potassium 3 9 02/10/2022 01:42 PM    Chloride 93 (L) 10/25/2022 10:25 AM    Chloride 92 (L) 10/25/2022 10:24 AM    Chloride 92 (L) 02/10/2022 01:42 PM    CO2 32 (H) 10/25/2022 10:25 AM    CO2 34 (H) 10/25/2022 10:24 AM    CO2 39 (H) 02/10/2022 01:42 PM    CO2, i-STAT 33 (H) 09/27/2022 01:13 PM    Creatinine 0 63 10/25/2022 10:25 AM    Creatinine 0 62 10/25/2022 10:24 AM    Creatinine 0 76 02/10/2022 01:42 PM    AST 31 10/25/2022 10:25 AM    AST 16 02/10/2022 01:42 PM    ALT 45 (H) 10/25/2022 10:25 AM    ALT 32 02/10/2022 01:42 PM    Albumin 4 3 10/25/2022 10:25 AM    Albumin 3 6 02/10/2022 01:42 PM    Globulin, Total 2 1 10/25/2022 10:25 AM    HDL 48 10/25/2022 10:25 AM    Triglycerides 126 10/25/2022 10:25 AM       Portions of the record may have been created with voice recognition software  Occasional wrong word or "sound a like" substitutions may have occurred due to the inherent limitations of voice recognition software  Read the chart carefully and recognize, using context, where substitutions have occurred

## 2022-11-07 DIAGNOSIS — Z79.899 ENCOUNTER FOR LONG-TERM (CURRENT) DRUG USE: ICD-10-CM

## 2022-11-08 ENCOUNTER — DOCUMENTATION (OUTPATIENT)
Dept: ENDOCRINOLOGY | Facility: HOSPITAL | Age: 62
End: 2022-11-08

## 2022-11-09 ENCOUNTER — PATIENT OUTREACH (OUTPATIENT)
Dept: FAMILY MEDICINE CLINIC | Facility: HOSPITAL | Age: 62
End: 2022-11-09

## 2022-11-09 RX ORDER — LORAZEPAM 0.5 MG/1
TABLET ORAL
Qty: 90 TABLET | Refills: 0 | Status: SHIPPED | OUTPATIENT
Start: 2022-11-09

## 2022-11-09 NOTE — PROGRESS NOTES
Outpatient Care Management Note:    Care manager called Puneet Maria  She was in good spirits  She states that her blood sugars are slowly improving  They are averaging between 300-350 during the day and 150-200 at night  Puneet Maria denies any symptoms of low blood sugar  We reviewed warning signs of low blood sugar and how to treat a low  She knows to call endocrine if she experiences any low blood sugars  Puneet Maria will be starting ozempic once she uses up her trulicity  We reviewed that Puneet Maria should send her blood sugars to endocrine in 2 weeks! Puneet Mraia states that she starting a diet supplement called Go Lo  She has lost 10 lbs in 1 week  She was 288 lb and is now 278 lbs  We reviewed upcoming appts  Puneet Maria still has not completed stool cultures or scheduled with GI  CM continues to encourage Ivy to complete above  Ivy informed CM that she will be getting disabilty and is waiting on paperwork  Ivy has my contact information and will call with any questions

## 2022-11-10 ENCOUNTER — HOSPITAL ENCOUNTER (OUTPATIENT)
Dept: PULMONOLOGY | Facility: HOSPITAL | Age: 62
End: 2022-11-10

## 2022-11-10 DIAGNOSIS — R06.02 SHORTNESS OF BREATH: ICD-10-CM

## 2022-11-10 RX ORDER — ALBUTEROL SULFATE 2.5 MG/3ML
2.5 SOLUTION RESPIRATORY (INHALATION) ONCE
Status: COMPLETED | OUTPATIENT
Start: 2022-11-10 | End: 2022-11-10

## 2022-11-10 RX ADMIN — ALBUTEROL SULFATE 2.5 MG: 2.5 SOLUTION RESPIRATORY (INHALATION) at 17:42

## 2022-11-10 NOTE — RESPIRATORY THERAPY NOTE
Patient Completed Pre and Post Spirometry, Pleth, and DLCO  Patient could not maintain a pant to meet requirements for Pleth  Invalid frequency even after several attempts and the  Pt decline to keep trying  Good pt effort through out testing just difficult for patient to get through without frequent breaks to use her O2  DLCO adjusted fot HB  Pt given 2 5 mg Albuterol via nebulizer  The Test Results meet ATS standards for acceptability and repeatability  Resting Sat 96% on 4 LPM  HR 96 BPM  Patient attempted 6mwt but only able to achieve 2 minutes out of the 4   The other 2 were rest then patient declined to continue for the rest

## 2022-11-15 ENCOUNTER — OFFICE VISIT (OUTPATIENT)
Dept: PULMONOLOGY | Facility: HOSPITAL | Age: 62
End: 2022-11-15

## 2022-11-15 VITALS
WEIGHT: 285 LBS | SYSTOLIC BLOOD PRESSURE: 142 MMHG | HEIGHT: 63 IN | DIASTOLIC BLOOD PRESSURE: 80 MMHG | HEART RATE: 86 BPM | BODY MASS INDEX: 50.5 KG/M2 | OXYGEN SATURATION: 94 %

## 2022-11-15 DIAGNOSIS — G47.33 OSA (OBSTRUCTIVE SLEEP APNEA): ICD-10-CM

## 2022-11-15 DIAGNOSIS — E66.01 MORBID OBESITY (HCC): ICD-10-CM

## 2022-11-15 DIAGNOSIS — J44.9 ASTHMA-COPD OVERLAP SYNDROME (HCC): Primary | ICD-10-CM

## 2022-11-15 DIAGNOSIS — J96.11 CHRONIC RESPIRATORY FAILURE WITH HYPOXIA (HCC): ICD-10-CM

## 2022-11-15 NOTE — PROGRESS NOTES
Assessment & Plan:      1  Asthma-COPD overlap syndrome (HCC)  CBC and differential    CBC and differential    glycopyrrolate-formoterol (BEVESPI AEROSPHERE) 9-4 8 MCG/ACT inhaler      2  Morbid obesity ([address] )  Ambulatory Referral to Weight Management      3  Chronic respiratory failure with hypoxia (HCC)        4  WILMA (obstructive sleep apnea)            · Patient presenting for follow-up, respiratory symptoms have been stable since her last visit  There has been gradual worsening over time  · We reviewed her complete PFTs with 6 minute walk an ABG  This is consistent with very severe COPD  Based on these results, would potentially be a candidate for endobronchial valve placement however she does have elevated right ventricular pressure on recent echocardiogram   Her BMI would also preclude her from this procedure as it would need to be less than 35  Currently > 50  · She is also interested in lung transplant evaluation  Again, would need to lose weight for this as well  I discussed with the patient that we could refer her to our [facility] for initial visit to learn more however she has opted to see weight management first  · I also recommended pulmonary rehab however she does not feel up to that quite yet and would like to try losing weight first to help with her stamina/endurance  · Continue 4 L of oxygen at all times  · I have again asked her to go for CBC with differential to check absolute eosinophil count    If she has significant peripheral eosinophilia, in the meantime we will trial a biologic agent to see if this helps controlling her symptoms  · Continue Pulmicort via nebulizer twice daily, Bevespi, Singulair 10 mg, Claritin, PRN albuterol  · Given elevated RV pressure, I would like to review CPAP compliance report to ensure residual AHI is less than 5  · Continues to follow up with Cardiology and is maintained on diuretics    Patient is accompanied by her friend/support person    Subjective: Patient ID: Abril Rosenthal is a 59 y o  female  Chief Complaint:  Jared Mauro is a pleasant 22-year-old female with past medical history including asthma, COPD, chronic respiratory failure on 4 L of oxygen, WILMA, morbid obesity, CHF, pulmonary hypertension presenting for follow-up  She says that her breathing continues to get worse over time  She was on prednisone a few months ago and felt better while she was on it but notes her concerns about frequent treatment with steroids  She reports compliance with her diuretics  She does not use her rescue inhaler or nebulizer very often  The following portions of the patient's history were reviewed in this encounter and updated as appropriate: Past medical, social, surgical, family, allergies    Review of Systems   Constitutional: Positive for fatigue  Respiratory: Positive for shortness of breath  All other systems reviewed and are negative  Objective:  Vitals:    11/15/22 1436 11/15/22 1438   BP: 142/80    BP Location: Left arm    Patient Position: Sitting    Cuff Size: Standard    Pulse: 86    SpO2:  94%   Weight: 129 kg (285 lb)    Height: 5' 3" (1 6 m)        Physical Exam  Vitals reviewed  Constitutional:       General: She is not in acute distress  Appearance: She is obese  She is not ill-appearing or toxic-appearing  HENT:      Head: Normocephalic and atraumatic  Eyes:      General: No scleral icterus  Cardiovascular:      Rate and Rhythm: Normal rate and regular rhythm  Pulmonary:      Effort: Pulmonary effort is normal       Breath sounds: Normal breath sounds  Musculoskeletal:         General: No deformity  Skin:     General: Skin is warm and dry  Neurological:      General: No focal deficit present  Mental Status: She is alert  Mental status is at baseline     Psychiatric:         Mood and Affect: Mood normal          Behavior: Behavior normal          Lab Review:   not applicable

## 2022-11-20 DIAGNOSIS — N18.2 TYPE 2 DIABETES MELLITUS WITH STAGE 2 CHRONIC KIDNEY DISEASE, WITH LONG-TERM CURRENT USE OF INSULIN (HCC): ICD-10-CM

## 2022-11-20 DIAGNOSIS — E11.22 TYPE 2 DIABETES MELLITUS WITH STAGE 2 CHRONIC KIDNEY DISEASE, WITH LONG-TERM CURRENT USE OF INSULIN (HCC): ICD-10-CM

## 2022-11-20 DIAGNOSIS — Z79.4 TYPE 2 DIABETES MELLITUS WITH STAGE 2 CHRONIC KIDNEY DISEASE, WITH LONG-TERM CURRENT USE OF INSULIN (HCC): ICD-10-CM

## 2022-11-21 RX ORDER — INSULIN LISPRO 100 [IU]/ML
12 INJECTION, SOLUTION INTRAVENOUS; SUBCUTANEOUS
Qty: 15 ML | Refills: 3 | Status: SHIPPED | OUTPATIENT
Start: 2022-11-21

## 2022-11-29 ENCOUNTER — PATIENT OUTREACH (OUTPATIENT)
Dept: FAMILY MEDICINE CLINIC | Facility: HOSPITAL | Age: 62
End: 2022-11-29

## 2022-11-29 DIAGNOSIS — Z79.4 TYPE 2 DIABETES MELLITUS WITH STAGE 2 CHRONIC KIDNEY DISEASE, WITH LONG-TERM CURRENT USE OF INSULIN (HCC): Primary | ICD-10-CM

## 2022-11-29 DIAGNOSIS — N18.2 TYPE 2 DIABETES MELLITUS WITH STAGE 2 CHRONIC KIDNEY DISEASE, WITH LONG-TERM CURRENT USE OF INSULIN (HCC): Primary | ICD-10-CM

## 2022-11-29 DIAGNOSIS — E11.22 TYPE 2 DIABETES MELLITUS WITH STAGE 2 CHRONIC KIDNEY DISEASE, WITH LONG-TERM CURRENT USE OF INSULIN (HCC): Primary | ICD-10-CM

## 2022-11-29 NOTE — PROGRESS NOTES
Outpatient Care Management Note:    Voice mail message left for Robel Posada, with my contact information, requesting a call back

## 2022-12-02 LAB
BASOPHILS # BLD AUTO: 0.1 X10E3/UL (ref 0–0.2)
BASOPHILS NFR BLD AUTO: 0 %
EOSINOPHIL # BLD AUTO: 0.3 X10E3/UL (ref 0–0.4)
EOSINOPHIL NFR BLD AUTO: 2 %
ERYTHROCYTE [DISTWIDTH] IN BLOOD BY AUTOMATED COUNT: 17 % (ref 11.7–15.4)
HCT VFR BLD AUTO: 36.8 % (ref 34–46.6)
HGB BLD-MCNC: 11 G/DL (ref 11.1–15.9)
IMM GRANULOCYTES # BLD: 0.2 X10E3/UL (ref 0–0.1)
IMM GRANULOCYTES NFR BLD: 1 %
LYMPHOCYTES # BLD AUTO: 2.2 X10E3/UL (ref 0.7–3.1)
LYMPHOCYTES NFR BLD AUTO: 16 %
MCH RBC QN AUTO: 22.1 PG (ref 26.6–33)
MCHC RBC AUTO-ENTMCNC: 29.9 G/DL (ref 31.5–35.7)
MCV RBC AUTO: 74 FL (ref 79–97)
MONOCYTES # BLD AUTO: 0.9 X10E3/UL (ref 0.1–0.9)
MONOCYTES NFR BLD AUTO: 6 %
NEUTROPHILS # BLD AUTO: 10.3 X10E3/UL (ref 1.4–7)
NEUTROPHILS NFR BLD AUTO: 75 %
PLATELET # BLD AUTO: 298 X10E3/UL (ref 150–450)
RBC # BLD AUTO: 4.98 X10E6/UL (ref 3.77–5.28)
WBC # BLD AUTO: 13.9 X10E3/UL (ref 3.4–10.8)

## 2022-12-04 DIAGNOSIS — I13.0 HYPERTENSIVE HEART AND CHRONIC KIDNEY DISEASE WITH HEART FAILURE AND STAGE 1 THROUGH STAGE 4 CHRONIC KIDNEY DISEASE, OR CHRONIC KIDNEY DISEASE (HCC): ICD-10-CM

## 2022-12-04 RX ORDER — TORSEMIDE 20 MG/1
TABLET ORAL
Qty: 180 TABLET | Refills: 5 | Status: SHIPPED | OUTPATIENT
Start: 2022-12-04 | End: 2022-12-05 | Stop reason: SDUPTHER

## 2022-12-05 DIAGNOSIS — Z79.899 ENCOUNTER FOR LONG-TERM (CURRENT) DRUG USE: ICD-10-CM

## 2022-12-05 DIAGNOSIS — I13.0 HYPERTENSIVE HEART AND CHRONIC KIDNEY DISEASE WITH HEART FAILURE AND STAGE 1 THROUGH STAGE 4 CHRONIC KIDNEY DISEASE, OR CHRONIC KIDNEY DISEASE (HCC): ICD-10-CM

## 2022-12-05 RX ORDER — LORAZEPAM 0.5 MG/1
TABLET ORAL
Qty: 90 TABLET | Refills: 0 | Status: SHIPPED | OUTPATIENT
Start: 2022-12-05

## 2022-12-05 RX ORDER — TORSEMIDE 20 MG/1
60 TABLET ORAL 2 TIMES DAILY
Qty: 540 TABLET | Refills: 0 | Status: SHIPPED | OUTPATIENT
Start: 2022-12-05 | End: 2023-02-27 | Stop reason: SDUPTHER

## 2022-12-06 ENCOUNTER — PATIENT OUTREACH (OUTPATIENT)
Dept: FAMILY MEDICINE CLINIC | Facility: HOSPITAL | Age: 62
End: 2022-12-06

## 2022-12-06 ENCOUNTER — TELEPHONE (OUTPATIENT)
Dept: ENDOCRINOLOGY | Facility: HOSPITAL | Age: 62
End: 2022-12-06

## 2022-12-06 NOTE — PROGRESS NOTES
Outpatient Care Management Note:    Care manager called Ivy  She states that she canceled her appt for today, because she is having diarrhea  We reviewed that this has been an ongoing problem  I strongly encouraged Ivy to complete stool cultures and schedule with GI  She agreed  Ivy did see podiatry, Dr Jignesh Jain, last week  She also scheduled her PCP and cardiology follow up appts  I congratulated her on getting these tasks done  Ivy states her blood sugars are averaging between 200-300  She was planning on having her dexcom downloaded today when she met with the diabetic educator  She will reschedule the appt to next week  She is not on the ozempic  She did start the [address]  Ivy requested CM follow up in 2 weeks  She has my contact information and will call with any questions

## 2022-12-06 NOTE — TELEPHONE ENCOUNTER
Patients appt  Was for 2:30 today and left message on answering machine at 1:59  Didn't have a chance to check messages until now  She said she needed to cancel because she wasn't feeling well and that she would like you to call  Thanks

## 2022-12-08 ENCOUNTER — OFFICE VISIT (OUTPATIENT)
Dept: DIABETES SERVICES | Facility: HOSPITAL | Age: 62
End: 2022-12-08

## 2022-12-08 VITALS — WEIGHT: 285 LBS | HEIGHT: 63 IN | BODY MASS INDEX: 50.5 KG/M2

## 2022-12-08 DIAGNOSIS — N18.2 TYPE 2 DIABETES MELLITUS WITH STAGE 2 CHRONIC KIDNEY DISEASE, WITH LONG-TERM CURRENT USE OF INSULIN (HCC): Primary | ICD-10-CM

## 2022-12-08 DIAGNOSIS — Z79.4 TYPE 2 DIABETES MELLITUS WITH STAGE 2 CHRONIC KIDNEY DISEASE, WITH LONG-TERM CURRENT USE OF INSULIN (HCC): Primary | ICD-10-CM

## 2022-12-08 DIAGNOSIS — E11.22 TYPE 2 DIABETES MELLITUS WITH STAGE 2 CHRONIC KIDNEY DISEASE, WITH LONG-TERM CURRENT USE OF INSULIN (HCC): Primary | ICD-10-CM

## 2022-12-08 NOTE — PROGRESS NOTES
Medical Nutrition Therapy     Assessment    Visit Type: Initial visit  Chief complaint:  Type 2 diabetes     HPI:   Met with Marisa Aranda for medical nutrition therapy  We previously met in 2021 for education as well  Here today for a refresher  At her last office visit with endocrinology it was determined that blood sugars rise significantly after breakfast, needs to work on breakfast choices  Dexcom clearly shows that is the case  Significant rise in blood sugars after breakfast, this is due to her eating a bowl of Ramen noodles for breakfast every day blood sugars are above 250 50% of the time  Blood sugars have variability throughout the day, this is due to inconsistencies in her carbohydrate intake as well as inconsistency with taking her insulin  Discussed the need to take insulin before meals rather than after, discussed strategy to help her to do that  Food recall shows primary issues: She will work on making changes to her breakfast choices to decrease her carbohydrate intake there  Discussed the benefit of consistent carbohydrate intake throughout the day for steady blood sugars  She will see endocrinology again in February, and would like to make an appointment to see me again in the spring, every few months to stay on track  Together we discussed what foods contain CHO, reading a food label, serving sizes, and set a carb goal of 30-45g CHO/meal to promote weight loss with 15g snacks  Put together sample meals for Mattie's reference and evaluated Mattie's current eating plan  Good understanding, Paulina Rosas will call with questions or for more education  Follow up as needed if not improving  Ht Readings from Last 1 Encounters:   12/08/22 5' 3" (1 6 m)     Wt Readings from Last 3 Encounters:   12/08/22 129 kg (285 lb)   11/15/22 129 kg (285 lb)   11/02/22 130 kg (287 lb)        Body mass index is 50 49 kg/m²       Lab Results   Component Value Date    HGBA1C 9 5 (H) 10/25/2022    HGBA1C 10 1 (A) 07/27/2022    HGBA1C 11 8 (A) 03/23/2022       Lab Results   Component Value Date    CHOL 150 09/22/2017    CHOL 134 03/23/2017    CHOL 168 07/11/2016     Lab Results   Component Value Date    HDL 48 10/25/2022    HDL 47 11/01/2021    HDL 62 10/17/2019     Lab Results   Component Value Date    LDLCALC 42 10/25/2022    LDLCALC 38 11/01/2021    LDLCALC 57 10/17/2019     Lab Results   Component Value Date    TRIG 126 10/25/2022    TRIG 117 11/01/2021    TRIG 100 10/17/2019     Lab Results   Component Value Date    CHOLHDL 2 3 10/25/2022       Weight Change: No    Medical Diagnosis/ICD 10 Code:  E11 22    Barriers to Learning: no barriers    Do you follow any special diet presently?: No  Who shops: patient  Who cooks: patient    Food Log: Completed via the method of food recall    Breakfast:up around 9:30am, eats breakfast at 10: jarrod noodles 1 container; oatmeal instant packets 2 mostly flavored with banana  Eggs with cheese no toast; no cold cereal  Takes mealtime right before eating 20u  Morning Snack: pretzels sometimes, usually not  Lunch: 1-2pm: sandwich only;  or salad with chicken on it with a little dressing light  Once a week hoagie  Afternoon Snack: carrots, pretzels, loves caulifor and broccoli  Dinner: 6-7pm: Chicken rice and veggies  1c rice  Evening Snack:veggies, banana, pretzels   Bed 10pm  Up at 12pm has a granola bar  Beverages: water, coffee not much, iced tea diet, diet soda, no juice,   Eating out/Take out:once a week taco [employer] burrito 2  Exercise ADL,     Nutrition Diagnosis:  Food and nutrition related knowledge deficit  related to Lack of prior exposure to accurate nutrition related information as evidenced by Verbalizes inaccurate or incomplete information    Intervention: label reading, behavior modification strategies, meal planning, individualized meal plan and monitoring portion control     Treatment Goals: Patient understands education and recommendations    Education Material Given  Yaniv Kiser was provided the Portion Booklet and Planning Healthy Meals     Monitoring and evaluation:    Term code indicator  FH 1 6 3 Carbohydrate Intake Criteria: 30-45g CHO per meal, <15g CHO snacks    Patient’s Response to Instruction:  Kamran Jacobs  Expected Compliancefair    Thank you for coming to the [address]  for education today  Please feel free to call with any questions or concerns      Tommie Perry, [address]  [address] [facility] [address]  [address] [facility] [id number]

## 2022-12-11 DIAGNOSIS — I48.91 ATRIAL FIBRILLATION, UNSPECIFIED TYPE (HCC): ICD-10-CM

## 2022-12-12 RX ORDER — APIXABAN 5 MG/1
TABLET, FILM COATED ORAL
Qty: 60 TABLET | Refills: 5 | Status: SHIPPED | OUTPATIENT
Start: 2022-12-12

## 2022-12-21 ENCOUNTER — OFFICE VISIT (OUTPATIENT)
Dept: PULMONOLOGY | Facility: HOSPITAL | Age: 62
End: 2022-12-21

## 2022-12-21 ENCOUNTER — PATIENT OUTREACH (OUTPATIENT)
Dept: FAMILY MEDICINE CLINIC | Facility: HOSPITAL | Age: 62
End: 2022-12-21

## 2022-12-21 VITALS
BODY MASS INDEX: 50.32 KG/M2 | HEIGHT: 63 IN | SYSTOLIC BLOOD PRESSURE: 118 MMHG | HEART RATE: 83 BPM | OXYGEN SATURATION: 92 % | DIASTOLIC BLOOD PRESSURE: 74 MMHG | TEMPERATURE: 98.6 F | WEIGHT: 284 LBS

## 2022-12-21 DIAGNOSIS — G47.33 OBSTRUCTIVE SLEEP APNEA: ICD-10-CM

## 2022-12-21 DIAGNOSIS — J44.9 ASTHMA-COPD OVERLAP SYNDROME (HCC): Primary | ICD-10-CM

## 2022-12-21 DIAGNOSIS — E66.01 MORBID OBESITY (HCC): ICD-10-CM

## 2022-12-21 DIAGNOSIS — J82.83 EOSINOPHILIC ASTHMA: ICD-10-CM

## 2022-12-21 DIAGNOSIS — J96.11 CHRONIC RESPIRATORY FAILURE WITH HYPOXIA (HCC): ICD-10-CM

## 2022-12-21 RX ORDER — EPINEPHRINE 0.3 MG/.3ML
0.3 INJECTION SUBCUTANEOUS ONCE
Qty: 0.6 ML | Refills: 0 | Status: SHIPPED | OUTPATIENT
Start: 2022-12-21 | End: 2022-12-29

## 2022-12-21 NOTE — PROGRESS NOTES
Outpatient Care Management Note:    Care manager called Ivy  She states that I woke her up  She did see the diabetic educator  Ivy is working to try and decrease her carbohydrate intake  She states her blood sugars have improved and are now averaging around 200  She will continue to monitor her diet  She will notify endocrine if her sugars are trending higher  Ivy states that she still has diarrhea symptoms everyday  She still has not completed the stool specimens  CM asked her why? She states that she needs to bring the containers in the bathroom  CM encouraged her to call GI for an appt and completed the stool specimens  I offered to get her a GI appt, but she declined  Ivy has my contact information  She agreed to CM following up in 3 weeks

## 2022-12-21 NOTE — PROGRESS NOTES
Assessment & Plan:      1  Asthma-COPD overlap syndrome (HCC)  Benralizumab 30 MG/ML SOAJ    EPINEPHrine (EPIPEN) 0 3 mg/0 3 mL SOAJ      2  Morbid obesity ([address] )        3  Chronic respiratory failure with hypoxia (HCC)        4  Obstructive sleep apnea        5  Eosinophilic asthma  Benralizumab 30 MG/ML SOAJ    EPINEPHrine (EPIPEN) 0 3 mg/0 3 mL SOAJ          · Patient presenting for follow-up, respiratory symptoms have been stable  She has had gradual worsening over time of her shortness of breath with exertion  · Complete PFTs consistent with very severe COPD also with bronchodilator responsiveness which goes along with her asthmatic component  She has elevated eosinophil count and would benefit from biologic agent such as Fasenra  We discussed potential side effects, risk v benefit, need for EpiPen, etc  · We may also consider endobronchial valve placement in the future however she was previously noted to have elevated right ventricular pressure on echocardiogram   Her BMI would also need to be less than 35 and is currently over 50  She is following with weight management  Weight loss will also help with her breathing  · At her next visit we will revisit the discussion of pulmonary rehab  She has previously declined as she felt that she was not up to it  · Continue Pulmicort via nebulizer twice daily, Bevespi, Singulair, Claritin, p r n  Albuterol  · She is using her CPAP and benefitting from it  · She remains on 4 L of oxygen at all times  · Continues to follow-up with cardiology and maintained on diuretics  · Discussed with her primary pulmonologist, Dr Lamine London    Patient is accompanied by her friend/support person    Subjective:     Patient ID: Jose Foley is a 57 y o  female      Chief Complaint:  Halima Villeda is a very pleasant 26-year-old female with past medical history including asthma, COPD, chronic respiratory failure on 4 L of oxygen, WILMA, morbid obesity, CHF, pulmonary hypertension presenting for follow-up  She says that her breathing continues to get worse over time but no changes since her last appointment  She was on prednisone a few months ago and felt better while she was on it but notes her concerns about frequent treatment with steroids  She reports compliance with her diuretics  She does not use her rescue inhaler or nebulizer very often  The following portions of the patient's history were reviewed in this encounter and updated as appropriate: Past medical, social, surgical, family, allergies    Review of Systems   Constitutional: Positive for fatigue  Respiratory: Positive for shortness of breath  All other systems reviewed and are negative  Objective:  Vitals:    12/21/22 1321   BP: 118/74   BP Location: Left arm   Patient Position: Sitting   Cuff Size: Large   Pulse: 83   Temp: 98 6 °F (37 °C)   TempSrc: Tympanic   SpO2: 92%   Weight: 129 kg (284 lb)   Height: 5' 3" (1 6 m)       Physical Exam  Vitals reviewed  Constitutional:       General: She is not in acute distress  Appearance: She is obese  She is not toxic-appearing  HENT:      Head: Normocephalic and atraumatic  Eyes:      General: No scleral icterus  Cardiovascular:      Rate and Rhythm: Normal rate and regular rhythm  Pulmonary:      Effort: Pulmonary effort is normal       Comments: Decreased breath sounds  Musculoskeletal:         General: No deformity  Skin:     General: Skin is warm and dry  Neurological:      General: No focal deficit present  Mental Status: She is alert  Mental status is at baseline     Psychiatric:         Mood and Affect: Mood normal          Behavior: Behavior normal          Lab Review:   Office Visit on 11/15/2022   Component Date Value   • White Blood Cell Count 12/01/2022 13 9 (H)    • Red Blood Cell Count 12/01/2022 4 98    • Hemoglobin 12/01/2022 11 0 (L)    • HCT 12/01/2022 36 8    • MCV 12/01/2022 74 (L)    • MCH 12/01/2022 22 1 (L)    • MCHC 12/01/2022 29 9 (L) • RDW 12/01/2022 17 0 (H)    • Platelet Count 97/75/4855 298    • Neutrophils 12/01/2022 75    • Lymphocytes 12/01/2022 16    • Monocytes 12/01/2022 6    • Eosinophils 12/01/2022 2    • Basophils PCT 12/01/2022 0    • Neutrophils (Absolute) 12/01/2022 10 3 (H)    • Lymphocytes (Absolute) 12/01/2022 2 2    • Monocytes (Absolute) 12/01/2022 0 9    • Eosinophils (Absolute) 12/01/2022 0 3    • Basophils ABS 12/01/2022 0 1    • Immature Granulocytes 12/01/2022 1    • Immature Granulocytes (A* 12/01/2022 0 2 (H)

## 2022-12-27 DIAGNOSIS — E11.69 DIABETES MELLITUS TYPE 2 IN OBESE (HCC): ICD-10-CM

## 2022-12-27 DIAGNOSIS — N18.2 TYPE 2 DIABETES MELLITUS WITH STAGE 2 CHRONIC KIDNEY DISEASE, WITH LONG-TERM CURRENT USE OF INSULIN (HCC): ICD-10-CM

## 2022-12-27 DIAGNOSIS — Z79.4 TYPE 2 DIABETES MELLITUS WITH STAGE 2 CHRONIC KIDNEY DISEASE, WITH LONG-TERM CURRENT USE OF INSULIN (HCC): ICD-10-CM

## 2022-12-27 DIAGNOSIS — N18.2 TYPE 2 DIABETES MELLITUS WITH STAGE 2 CHRONIC KIDNEY DISEASE, WITHOUT LONG-TERM CURRENT USE OF INSULIN (HCC): ICD-10-CM

## 2022-12-27 DIAGNOSIS — E11.22 TYPE 2 DIABETES MELLITUS WITH STAGE 2 CHRONIC KIDNEY DISEASE, WITHOUT LONG-TERM CURRENT USE OF INSULIN (HCC): ICD-10-CM

## 2022-12-27 DIAGNOSIS — E11.22 TYPE 2 DIABETES MELLITUS WITH STAGE 2 CHRONIC KIDNEY DISEASE, WITH LONG-TERM CURRENT USE OF INSULIN (HCC): ICD-10-CM

## 2022-12-27 DIAGNOSIS — E66.9 DIABETES MELLITUS TYPE 2 IN OBESE (HCC): ICD-10-CM

## 2022-12-27 RX ORDER — INSULIN GLARGINE 100 [IU]/ML
28 INJECTION, SOLUTION SUBCUTANEOUS
Qty: 30 ML | Refills: 1 | Status: SHIPPED | OUTPATIENT
Start: 2022-12-27

## 2022-12-27 RX ORDER — ATORVASTATIN CALCIUM 40 MG/1
TABLET, FILM COATED ORAL
Qty: 30 TABLET | Refills: 5 | Status: SHIPPED | OUTPATIENT
Start: 2022-12-27

## 2022-12-29 ENCOUNTER — DOCUMENTATION (OUTPATIENT)
Dept: PULMONOLOGY | Facility: CLINIC | Age: 62
End: 2022-12-29

## 2022-12-29 ENCOUNTER — OFFICE VISIT (OUTPATIENT)
Dept: CARDIOLOGY CLINIC | Facility: CLINIC | Age: 62
End: 2022-12-29

## 2022-12-29 VITALS
SYSTOLIC BLOOD PRESSURE: 126 MMHG | BODY MASS INDEX: 50.5 KG/M2 | DIASTOLIC BLOOD PRESSURE: 68 MMHG | OXYGEN SATURATION: 92 % | HEIGHT: 63 IN | HEART RATE: 89 BPM | WEIGHT: 285 LBS

## 2022-12-29 DIAGNOSIS — I50.32 CHRONIC DIASTOLIC CONGESTIVE HEART FAILURE (HCC): ICD-10-CM

## 2022-12-29 DIAGNOSIS — I27.20 PULMONARY HYPERTENSION (HCC): ICD-10-CM

## 2022-12-29 DIAGNOSIS — E78.00 HYPERCHOLESTEROLEMIA: ICD-10-CM

## 2022-12-29 DIAGNOSIS — I50.31 ACUTE DIASTOLIC CONGESTIVE HEART FAILURE (HCC): ICD-10-CM

## 2022-12-29 DIAGNOSIS — J45.50 SEVERE PERSISTENT ASTHMA, UNSPECIFIED WHETHER COMPLICATED: Primary | ICD-10-CM

## 2022-12-29 DIAGNOSIS — I48.0 PAROXYSMAL ATRIAL FIBRILLATION (HCC): Primary | ICD-10-CM

## 2022-12-29 RX ORDER — POTASSIUM CHLORIDE 20 MEQ/1
20 TABLET, EXTENDED RELEASE ORAL 2 TIMES DAILY
Qty: 60 TABLET | Refills: 5 | Status: SHIPPED | OUTPATIENT
Start: 2022-12-29

## 2022-12-29 NOTE — PROGRESS NOTES
Cardiology Outpatient Follow-Up Note - Jessie Constantino 57 y o  female MRN: [id number]      Assessment/Plan:  1  Paroxysmal atrial fibrillation (HCC)  We have changed from a rhythm control strategy to rate control strategy due to failure of flecainide to control her rhythm  By exam she is likely in sinus rhythm today  Continue  Metoprolol  mg daily  Continue thromboembolic prophylaxis with apixaban 5 mg twice daily   - POCT ECG    2  Chronic diastolic congestive heart failure ([address] )  She is near euvolemic and well compensated  She has significant dyspnea likely due to her severe pulmonary issues  Continue torsemide 60 mg twice daily with potassium supplementation 20 mEq twice daily       3  Pulmonary hypertension ([address] )  Likely group 2 due to left-sided heart disease, diastolic CHF  Continue diuretics as above  Last echocardiogram on her new diuretic regimen showed improved RVSP of 48 mmHg, compared to prior right heart catheterization showing PASP of 80 mmHg  4  Hypercholesterolemia  Continue atorvastatin 40 mg daily  We will see Jessie Constantino back in 3 months for routine follow-up  Subjective:     HPI: Jessie Constantino is a 57y o  year old female who presented to me initially on 9/9/21 for further evaluation of CHF with preserved LVEF, pulmonary hypertension by echo estimation, and atrial fibrillation on flecainide therapy  She also has chronic hypoxic respiratory failure due to asthma COPD overlap syndrome and is followed by Pulmonology       I referred her for RHC which was performed on 9/30/21  The study showed severely elevated right (RAP 20mmHg) and left (PCWP 30mmHg) sided filling pressure  There was severe post-capillary pulmonary hypertension (PAP 80/38/52) with a PVR of 2 7WU   Cardiac output was normal  The study was suggestive of primarily group II pulmonary hypertension due to left sided heart disease and elevated filling pressure       After her RHC her torsemide was increased from 40mg BID to 60mg BID  She had not noticed much of a difference  She has significant baseline RICCI  She still rests frequently after minimal exertion due to dyspnea       She was last seen by me on 9/20/2020, at which time we maintained her torsemide therapy at 60 mg twice daily  She was in atrial fibrillation at that time, and we stopped flecainide therapy  Repeat echo was performed on 10/4/2022 which showed normal LV size and systolic function, LVEF 31%, grade 2 diastolic dysfunction, normal RV size and function, estimated RVSP 48 mmHg  She has been diagnosed with eosinophilic asthma and is due to start benralizumab therapy soon, pending approval   She reports baseline dyspnea  Minimal symptoms from AF, occasionally experiencing a fluttering sensation  ROS:  Review of Systems:  Review of Systems   Constitutional: Positive for fatigue  Negative for activity change  HENT: Negative for facial swelling  Eyes: Negative for visual disturbance  Respiratory: Positive for shortness of breath  Negative for chest tightness  Cardiovascular: Negative for chest pain, palpitations and leg swelling  Gastrointestinal: Negative for nausea and vomiting  Musculoskeletal: Negative for myalgias  Skin: Negative for pallor  Allergic/Immunologic: Negative for immunocompromised state  Neurological: Negative for dizziness, syncope and light-headedness  Psychiatric/Behavioral: Negative for agitation and confusion  The patient is not nervous/anxious  Objective:     Vitals: There were no vitals filed for this visit  There is no height or weight on file to calculate BSA    Wt Readings from Last 3 Encounters:   12/21/22 129 kg (284 lb)   12/08/22 129 kg (285 lb)   11/15/22 129 kg (285 lb)       Physical Exam:  General: Milad Ross is a chronically ill and morbidly obese female, on oxygen therapy, in a wheelchair  HEENT: moist mucous membranes, EOMI  Neck:  No JVD, supple, trachea midline  Cardiovascular: unremarkable S1/S2, regular rate and rhythm, no murmurs, rubs or gallops  Pulmonary: Increased respiratory effort, tachypnea, on oxygen therapy, diminished lung sounds  Abdomen: soft and nondistended  Extremities: Trace to +1 lower extremity edema  Warm and well perfused extremities  Neuro: no focal motor deficits, AAOx3 (person, place, time)  Psych: Normal mood and affect, cooperative      Medications (at the START of this encounter): Outpatient Medications Prior to Visit   Medication Sig Dispense Refill   • Insulin Glargine Solostar (Lantus SoloStar) 100 UNIT/ML SOPN Inject 0 28 mL (28 Units total) under the skin daily at bedtime 30 mL 1   • albuterol (2 5 mg/3 mL) 0 083 % nebulizer solution Take 3 mL (2 5 mg total) by nebulization every 6 (six) hours as needed for wheezing or shortness of breath 1080 mL 3   • albuterol (ProAir HFA) 90 mcg/act inhaler Inhale 2 puffs every 6 (six) hours as needed for wheezing or shortness of breath 8 5 g 3   • albuterol (PROVENTIL HFA,VENTOLIN HFA) 90 mcg/act inhaler Inhale 2 puffs every 4 (four) hours as needed for wheezing 1 Inhaler 4   • ascorbic acid (VITAMIN C) 1000 MG tablet Take 1,000 mg by mouth daily (Patient not taking: Reported on 9/29/2022)     • atorvastatin (LIPITOR) 40 mg tablet TAKE 1 TABLET BY MOUTH EVERY DAY 30 tablet 5   • Benralizumab 30 MG/ML SOAJ Inject 1 mL under the skin every 28 days 1 mL 2   • Blood Glucose Monitoring Suppl (NABILA CONTOUR NEXT MONITOR) w/Device KIT by Does not apply route daily (Patient not taking: Reported on 12/21/2022)     • budesonide (Pulmicort) 0 5 mg/2 mL nebulizer solution Take 2 mL (0 5 mg total) by nebulization 2 (two) times a day Rinse mouth after use   60 mL 3   • Cholecalciferol 1000 units tablet Take 1,000 Units by mouth daily (Patient not taking: Reported on 9/29/2022)     • Contour Next Test test strip USE TO TEST BLOOD SUGAR 3 TIMES A  strip 3   • [employer] Lancets Thin 26G MISC USE 3 (THREE) TIMES A  each 5   • cyanocobalamin (VITAMIN B-12) 100 mcg tablet Take by mouth daily (Patient not taking: Reported on 9/29/2022)     • Eliquis 5 MG TAKE 1 TABLET BY MOUTH TWICE A DAY 60 tablet 5   • EPINEPHrine (EPIPEN) 0 3 mg/0 3 mL SOAJ Inject 0 3 mL (0 3 mg total) into a muscle once for 1 dose 0 6 mL 0   • escitalopram (LEXAPRO) 20 mg tablet TAKE 1 TABLET BY MOUTH EVERY DAY 90 tablet 0   • ferrous sulfate 220 (44 Fe) mg/5 mL solution TAKE 5 ML (220 MG TOTAL) BY MOUTH 2 (TWO) TIMES A DAY BEFORE MEALS (Patient not taking: Reported on 9/29/2022) 473 mL 2   • fluticasone (FLONASE) 50 mcg/act nasal spray 1 spray into each nostril 2 (two) times a day (Patient not taking: Reported on 12/21/2022) 1 Bottle 3   • glycopyrrolate-formoterol (BEVESPI AEROSPHERE) 9-4 8 MCG/ACT inhaler Inhale 2 puffs 2 (two) times a day 10 7 g 5   • insulin lispro (HumaLOG) 100 units/mL injection pen INJECT 12 UNITS UNDER THE SKIN 3 (THREE) TIMES A DAY WITH MEALS 15 mL 3   • Insulin Pen Needle (BD Pen Needle Love 2nd Gen) 32G X 4 MM MISC Use daily at bedtime Use 4 new needles daily   400 each 3   • Klor-Con M20 20 MEQ tablet TAKE 1 TABLET BY MOUTH 2 TIMES A DAY   60 tablet 5   • levalbuterol (XOPENEX) 1 25 mg/0 5 mL nebulizer solution Take 0 5 mL (1 25 mg total) by nebulization 2 (two) times a day 60 vial 0   • liraglutide (VICTOZA) injection Inject 0 1 mL (0 6 mg total) under the skin daily 9 mL 1   • loratadine (CLARITIN) 10 mg tablet Take by mouth     • LORazepam (ATIVAN) 0 5 mg tablet TAKE 2 TABLETS BY MOUTH AT BEDTIME AND 1 EVERY 6 HOURS AS NEEDED FOR ANXIETY 90 tablet 0   • Magnesium 400 MG CAPS Take by mouth 1 BID     • metFORMIN (GLUCOPHAGE-XR) 500 mg 24 hr tablet TAKE 2 TABLETS BY MOUTH TWICE A DAY WITH FOOD 360 tablet 1   • metolazone (ZAROXOLYN) 5 mg tablet Take 1 tablet (5 mg total) by mouth daily 5 tablet 0   • metoprolol succinate (TOPROL-XL) 100 mg 24 hr tablet Take 1 tablet (100 mg total) by mouth daily 30 tablet 11   • mometasone (ELOCON) 0 1 % cream Apply topically daily for 7 days (Patient not taking: Reported on 9/29/2022) 45 g 0   • montelukast (SINGULAIR) 10 mg tablet TAKE 1 TABLET BY MOUTH DAILY AT BEDTIME 90 tablet 3   • naloxone (NARCAN) 4 mg/0 1 mL nasal spray Administer 1 spray into a nostril  If breathing does not return to normal or if breathing difficulty resumes after 2-3 minutes, give another dose in the other nostril using a new spray  1 each 1   • neomycin-polymyxin-dexamethasone (MAXITROL) ophthalmic suspension INSTILL ONE DROP INTO THE LEFT EYE FOUR TIMES DAILY FOR 2 WEEKS (Patient not taking: Reported on 9/29/2022)     • nystatin (MYCOSTATIN) cream Apply topically 2 (two) times a day for 10 days (Patient not taking: Reported on 12/21/2022) 30 g 0   • omeprazole (PriLOSEC) 40 MG capsule Take 1 capsule (40 mg total) by mouth daily 90 capsule 3   • Semaglutide,0 25 or 0 5MG/DOS, (Ozempic, 0 25 or 0 5 MG/DOSE,) 2 MG/1 5ML SOPN Inject 0 5 mg under the skin once a week (Patient not taking: Reported on 11/15/2022) 4 5 mL 1   • torsemide (DEMADEX) 20 mg tablet Take 3 tablets (60 mg total) by mouth 2 (two) times a day 540 tablet 0   • traZODone (DESYREL) 150 mg tablet TAKE 1 TABLET BY MOUTH EVERYDAY AT BEDTIME 90 tablet 0     No facility-administered medications prior to visit  Labs & Results:        EKG personally reviewed:  n/a        Time Spent:  Total time (face-to-face and non-face-to-face) spent on today's visit was 20 minutes  This includes preparation for the visits (i e  reviewing test results), performance of a medically appropriate history and examination, and orders for medications, tests or other procedures  This time is exclusive of procedures performed and time spent teaching  This note was completed in part utilizing [facility] [employer] direct voice recognition software   Grammatical errors, random word insertion, spelling mistakes, occasional wrong word or "sound-alike" substitutions and incomplete sentences may be an occasional consequence of the system secondary to software limitations, ambient noise and hardware issues  At the time of dictation, efforts were made to edit, clarify and /or correct errors  Please read the chart carefully and recognize, using context, where substitutions have occurred  If you have any questions or concerns about the context, text or information contained within the body of this dictation, please contact myself, the provider, for further clarification

## 2022-12-29 NOTE — PROGRESS NOTES
Receieved approval for [employer] injector from [employer] [facility]        Loading dose:     12/23/2022-04/28/2023    Maintenance dose:     04/29/2023-12/23/2023    Scripts have been e-scribed to perform specialty pharmacy,

## 2022-12-30 DIAGNOSIS — Z79.899 ENCOUNTER FOR LONG-TERM (CURRENT) DRUG USE: ICD-10-CM

## 2022-12-30 RX ORDER — LORAZEPAM 0.5 MG/1
TABLET ORAL
Qty: 90 TABLET | Refills: 0 | Status: SHIPPED | OUTPATIENT
Start: 2022-12-30

## 2023-01-09 ENCOUNTER — OFFICE VISIT (OUTPATIENT)
Dept: FAMILY MEDICINE CLINIC | Facility: HOSPITAL | Age: 63
End: 2023-01-09

## 2023-01-09 ENCOUNTER — TELEPHONE (OUTPATIENT)
Dept: PULMONOLOGY | Facility: HOSPITAL | Age: 63
End: 2023-01-09

## 2023-01-09 VITALS
SYSTOLIC BLOOD PRESSURE: 146 MMHG | HEART RATE: 95 BPM | HEIGHT: 63 IN | OXYGEN SATURATION: 88 % | DIASTOLIC BLOOD PRESSURE: 80 MMHG | WEIGHT: 286.6 LBS | BODY MASS INDEX: 50.78 KG/M2 | TEMPERATURE: 97 F

## 2023-01-09 DIAGNOSIS — J96.11 CHRONIC RESPIRATORY FAILURE WITH HYPOXIA (HCC): ICD-10-CM

## 2023-01-09 DIAGNOSIS — K21.9 GASTROESOPHAGEAL REFLUX DISEASE WITHOUT ESOPHAGITIS: ICD-10-CM

## 2023-01-09 DIAGNOSIS — I13.0 HYPERTENSIVE HEART AND CHRONIC KIDNEY DISEASE WITH HEART FAILURE AND STAGE 1 THROUGH STAGE 4 CHRONIC KIDNEY DISEASE, OR CHRONIC KIDNEY DISEASE (HCC): ICD-10-CM

## 2023-01-09 DIAGNOSIS — E11.42 DIABETIC POLYNEUROPATHY ASSOCIATED WITH TYPE 2 DIABETES MELLITUS (HCC): ICD-10-CM

## 2023-01-09 DIAGNOSIS — N18.2 TYPE 2 DIABETES MELLITUS WITH STAGE 2 CHRONIC KIDNEY DISEASE, WITH LONG-TERM CURRENT USE OF INSULIN (HCC): Primary | ICD-10-CM

## 2023-01-09 DIAGNOSIS — J45.50 SEVERE PERSISTENT ASTHMA, UNSPECIFIED WHETHER COMPLICATED: ICD-10-CM

## 2023-01-09 DIAGNOSIS — G25.81 RESTLESS LEGS SYNDROME (RLS): ICD-10-CM

## 2023-01-09 DIAGNOSIS — G47.33 OBSTRUCTIVE SLEEP APNEA: ICD-10-CM

## 2023-01-09 DIAGNOSIS — L98.9 SKIN LESION: ICD-10-CM

## 2023-01-09 DIAGNOSIS — J44.9 ASTHMA-COPD OVERLAP SYNDROME (HCC): ICD-10-CM

## 2023-01-09 DIAGNOSIS — F32.1 CURRENT MODERATE EPISODE OF MAJOR DEPRESSIVE DISORDER WITHOUT PRIOR EPISODE (HCC): ICD-10-CM

## 2023-01-09 DIAGNOSIS — E78.00 HYPERCHOLESTEROLEMIA: ICD-10-CM

## 2023-01-09 DIAGNOSIS — E11.22 TYPE 2 DIABETES MELLITUS WITH STAGE 2 CHRONIC KIDNEY DISEASE, WITH LONG-TERM CURRENT USE OF INSULIN (HCC): Primary | ICD-10-CM

## 2023-01-09 DIAGNOSIS — Z23 ENCOUNTER FOR IMMUNIZATION: ICD-10-CM

## 2023-01-09 DIAGNOSIS — Z79.4 TYPE 2 DIABETES MELLITUS WITH STAGE 2 CHRONIC KIDNEY DISEASE, WITH LONG-TERM CURRENT USE OF INSULIN (HCC): Primary | ICD-10-CM

## 2023-01-09 PROBLEM — K57.92 DIVERTICULITIS: Status: RESOLVED | Noted: 2021-06-06 | Resolved: 2023-01-09

## 2023-01-09 PROBLEM — D62 ACUTE BLOOD LOSS ANEMIA: Status: RESOLVED | Noted: 2021-06-01 | Resolved: 2023-01-09

## 2023-01-09 NOTE — PROGRESS NOTES
Name: Dolores Ayala      : 1960      MRN: [id number]  Encounter Provider: Myrna Munoz MD  Encounter Date: 2023   Encounter department: [facility] [place] [facility]      1  Type 2 diabetes mellitus with stage 2 chronic kidney disease, with long-term current use of insulin ([facility] )    2  Encounter for immunization  -     influenza vaccine, quadrivalent, recombinant, PF, 0 5 mL, for patients 18 yr+ (FLUBLOK)    3  Skin lesion    4  Severe persistent asthma, unspecified whether complicated    5  Chronic respiratory failure with hypoxia (HCC)    6  Asthma-COPD overlap syndrome (HCC)    7  Current moderate episode of major depressive disorder without prior episode ([facility] )    8  Diabetic polyneuropathy associated with type 2 diabetes mellitus ([place] [address] )    9  Gastroesophageal reflux disease without esophagitis    10  Hypercholesterolemia    11  Hypertensive heart and chronic kidney disease with heart failure and stage 1 through stage 4 chronic kidney disease, or chronic kidney disease ([place] [address] )  Comments:  stable  ongoing fu with cardiology  monitor  12  Obstructive sleep apnea    13  Restless legs syndrome (RLS)     diabetes mellitus  Uncontrolled  Working with Endo, nutrition, weigh tloss center  Cardio recommending Ozempic  This was ordered by Endo but not on formulary  She is currently on victoza  Blood sugars remain elevated  Would suggest maximizing dosing of victoza  She will review with Endo in her upcoming apt  RLS  Discussed adding more medications however she does have a low hemoglobin and low mcv  Discussed iron deficiency may aggravate RLS  To restart Iron supplementation  Asthma, severe  COPD overalp  Working with Xavi George  On chronic o2  Awaiting to get started on biologics  MDD  Maintains on lexapro  Adam  Stable  On cpap  Skin lesion on right gluteal area  Appears benign  Monitor       Subjective       Patient here for fu of chronic conditons  Concerned aobut a skin lesion on the right lgluteal area  Reports cardio rec ommending being on ozempic to help with lowering of cardiovascular risk  Has had an increase in RLS sytmpoms  Continues to fu with cardio,pulmo, weight loss center/nutritionist, and Endo  Review of Systems   Constitutional: Negative  Negative for activity change, appetite change, chills, diaphoresis, fatigue and fever  HENT: Negative for congestion, facial swelling and sore throat  Respiratory: Positive for shortness of breath  Negative for apnea, cough and chest tightness  Cardiovascular: Negative  Negative for chest pain and palpitations  Gastrointestinal: Negative  Negative for abdominal distention, abdominal pain, blood in stool, constipation, diarrhea and nausea  Genitourinary: Negative  Negative for difficulty urinating, dysuria, flank pain and frequency  Current Outpatient Medications on File Prior to Visit   Medication Sig   • albuterol (2 5 mg/3 mL) 0 083 % nebulizer solution Take 3 mL (2 5 mg total) by nebulization every 6 (six) hours as needed for wheezing or shortness of breath   • albuterol (ProAir HFA) 90 mcg/act inhaler Inhale 2 puffs every 6 (six) hours as needed for wheezing or shortness of breath   • albuterol (PROVENTIL HFA,VENTOLIN HFA) 90 mcg/act inhaler Inhale 2 puffs every 4 (four) hours as needed for wheezing   • atorvastatin (LIPITOR) 40 mg tablet TAKE 1 TABLET BY MOUTH EVERY DAY   • Benralizumab 30 MG/ML SOAJ Inject 1 mL under the skin every 28 days   • Benralizumab 30 MG/ML SOAJ Inject 30 mg under the skin every 28 days for 3 doses   • Benralizumab 30 MG/ML SOAJ Inject 30 mg under the skin every 56 days   • Blood Glucose Monitoring Suppl ([employer] CONTOUR NEXT MONITOR) w/Device KIT Use daily   • budesonide (Pulmicort) 0 5 mg/2 mL nebulizer solution Take 2 mL (0 5 mg total) by nebulization 2 (two) times a day Rinse mouth after use     • Cholecalciferol 1000 units tablet Take 1,000 Units by mouth daily   • Contour Next Test test strip USE TO TEST BLOOD SUGAR 3 TIMES A DAY   • [employer] Lancets Thin 26G MISC USE 3 (THREE) TIMES A DAY   • cyanocobalamin (VITAMIN B-12) 100 mcg tablet Take by mouth daily   • Eliquis 5 MG TAKE 1 TABLET BY MOUTH TWICE A DAY   • escitalopram (LEXAPRO) 20 mg tablet TAKE 1 TABLET BY MOUTH EVERY DAY   • fluticasone (FLONASE) 50 mcg/act nasal spray 1 spray into each nostril 2 (two) times a day   • glycopyrrolate-formoterol (BEVESPI AEROSPHERE) 9-4 8 MCG/ACT inhaler Inhale 2 puffs 2 (two) times a day   • Insulin Glargine Solostar (Lantus SoloStar) 100 UNIT/ML SOPN Inject 0 28 mL (28 Units total) under the skin daily at bedtime   • insulin lispro (HumaLOG) 100 units/mL injection pen INJECT 12 UNITS UNDER THE SKIN 3 (THREE) TIMES A DAY WITH MEALS   • Insulin Pen Needle (BD Pen Needle Love 2nd Gen) 32G X 4 MM MISC Use daily at bedtime Use 4 new needles daily   • levalbuterol (XOPENEX) 1 25 mg/0 5 mL nebulizer solution Take 0 5 mL (1 25 mg total) by nebulization 2 (two) times a day   • liraglutide (VICTOZA) injection Inject 0 1 mL (0 6 mg total) under the skin daily   • loratadine (CLARITIN) 10 mg tablet Take by mouth   • LORazepam (ATIVAN) 0 5 mg tablet TAKE 2 TABLETS BY MOUTH AT BEDTIME AND 1 EVERY 6 HOURS AS NEEDED FOR ANXIETY   • metFORMIN (GLUCOPHAGE-XR) 500 mg 24 hr tablet TAKE 2 TABLETS BY MOUTH TWICE A DAY WITH FOOD   • metolazone (ZAROXOLYN) 5 mg tablet Take 1 tablet (5 mg total) by mouth daily   • metoprolol succinate (TOPROL-XL) 100 mg 24 hr tablet Take 1 tablet (100 mg total) by mouth daily   • montelukast (SINGULAIR) 10 mg tablet TAKE 1 TABLET BY MOUTH DAILY AT BEDTIME   • naloxone (NARCAN) 4 mg/0 1 mL nasal spray Administer 1 spray into a nostril  If breathing does not return to normal or if breathing difficulty resumes after 2-3 minutes, give another dose in the other nostril using a new spray     • omeprazole (PriLOSEC) 40 MG capsule Take 1 capsule (40 mg total) by mouth daily   • potassium chloride (Klor-Con M20) 20 mEq tablet Take 1 tablet (20 mEq total) by mouth 2 (two) times a day   • torsemide (DEMADEX) 20 mg tablet Take 3 tablets (60 mg total) by mouth 2 (two) times a day   • traZODone (DESYREL) 150 mg tablet TAKE 1 TABLET BY MOUTH EVERYDAY AT BEDTIME   • [DISCONTINUED] Magnesium 400 MG CAPS Take by mouth 1 BID   • [DISCONTINUED] neomycin-polymyxin-dexamethasone (MAXITROL) ophthalmic suspension    • [DISCONTINUED] Semaglutide,0 25 or 0 5MG/DOS, (Ozempic, 0 25 or 0 5 MG/DOSE,) 2 MG/1 5ML SOPN Inject 0 5 mg under the skin once a week   • ascorbic acid (VITAMIN C) 1000 MG tablet Take 1,000 mg by mouth daily (Patient not taking: Reported on 9/29/2022)   • EPINEPHrine (EPIPEN) 0 3 mg/0 3 mL SOAJ Inject 0 3 mL (0 3 mg total) into a muscle once for 1 dose   • ferrous sulfate 220 (44 Fe) mg/5 mL solution TAKE 5 ML (220 MG TOTAL) BY MOUTH 2 (TWO) TIMES A DAY BEFORE MEALS (Patient not taking: Reported on 9/29/2022)   • [DISCONTINUED] mometasone (ELOCON) 0 1 % cream Apply topically daily for 7 days (Patient not taking: Reported on 9/29/2022)   • [DISCONTINUED] nystatin (MYCOSTATIN) cream Apply topically 2 (two) times a day for 10 days (Patient not taking: Reported on 12/21/2022)       Objective     /80   Pulse 95   Temp (!) 97 °F (36 1 °C)   Ht 5' 3" (1 6 m)   Wt 130 kg (286 lb 9 6 oz)   SpO2 (!) 88%   BMI 50 77 kg/m²     Physical Exam  Vitals and nursing note reviewed  Constitutional:       Appearance: Normal appearance  She is ill-appearing  HENT:      Head: Normocephalic  Nose: Nose normal       Mouth/Throat:      Mouth: Mucous membranes are moist    Eyes:      Extraocular Movements: Extraocular movements intact  Pupils: Pupils are equal, round, and reactive to light  Cardiovascular:      Rate and Rhythm: Normal rate and regular rhythm  Heart sounds: Normal heart sounds     Pulmonary:      Effort: Pulmonary effort is normal       Breath sounds: Normal breath sounds  Abdominal:      General: Bowel sounds are normal       Palpations: Abdomen is soft  Musculoskeletal:      Cervical back: Neck supple  Skin:     Capillary Refill: Capillary refill takes less than 2 seconds  Neurological:      Mental Status: She is alert and oriented to person, place, and time  Mental status is at baseline     Psychiatric:         Mood and Affect: Mood normal          Behavior: Behavior normal        Sade Ledezma MD

## 2023-01-12 ENCOUNTER — PATIENT OUTREACH (OUTPATIENT)
Dept: FAMILY MEDICINE CLINIC | Facility: HOSPITAL | Age: 63
End: 2023-01-12

## 2023-01-12 NOTE — PROGRESS NOTES
Outpatient Care Management Note:    Care manager called Ivy  She has seen cardiology, pulmonary, and her PCP in the past several weeks  She states that she is waiting to hear from pulmonary about a new injectable medication to improve her asthma symptoms and breathing  She states she will get the first dose in the infusion center  She continues on her oxygen at 4L  She continues to get short of breath with exertion  Ivy states her blood sugars have been running high  They have been up in the 400s  CM instructed Ivy to send her blood sugars to endocrine today  She can have them download her dexcom and make medication adjustments if needed  Ivy states that she has stopped eating noodles, pretzels and gold fish  She is trying to make adjustments  We reviewed that all of these foods are carbohydrates which turn to sugar  We discussed that it was noted that weight loss would help her sugars and her breathing  Ivy is interested in weight loss  She will talk with endocrine to see if a referral to weight management program would be beneficial      Ivy feels her diarrhea symptoms are improved  She admits to episodes once a day or once every other day  She states that she is to follow up with GI but has not  I encouraged her to call and schedule  Ivy lives alone  She does drive  We discussed a medical alert system  Ivy will call [facility] to determine if it is covered through her insurance  Ivy has my contact information and will call with any questions

## 2023-01-16 ENCOUNTER — PATIENT OUTREACH (OUTPATIENT)
Dept: FAMILY MEDICINE CLINIC | Facility: HOSPITAL | Age: 63
End: 2023-01-16

## 2023-01-16 DIAGNOSIS — E11.22 TYPE 2 DIABETES MELLITUS WITH STAGE 2 CHRONIC KIDNEY DISEASE, WITH LONG-TERM CURRENT USE OF INSULIN (HCC): ICD-10-CM

## 2023-01-16 DIAGNOSIS — J44.9 ASTHMA-COPD OVERLAP SYNDROME (HCC): ICD-10-CM

## 2023-01-16 DIAGNOSIS — Z79.4 TYPE 2 DIABETES MELLITUS WITH STAGE 2 CHRONIC KIDNEY DISEASE, WITH LONG-TERM CURRENT USE OF INSULIN (HCC): ICD-10-CM

## 2023-01-16 DIAGNOSIS — N18.2 TYPE 2 DIABETES MELLITUS WITH STAGE 2 CHRONIC KIDNEY DISEASE, WITH LONG-TERM CURRENT USE OF INSULIN (HCC): ICD-10-CM

## 2023-01-16 NOTE — PROGRESS NOTES
I was able to call the patient and she is currently on the Victoza and she is injecting 0 6 daily  I did inform her that she would need to increase to 1 2 mg    She stated that she would like you to call in a new prescription for her to pharmacy for the 1 2 mg

## 2023-01-16 NOTE — PROGRESS NOTES
Outpatient Care Management Note:    Care manager called Ivy to follow up on her blood sugars  She states that they are continuing to run between 300-400  Ivy did not have her dexcom downloaded as requested  Cm reviewed my concern related to continued elevated blood sugars  Ivy agreed to get her sugars to endocrine this week

## 2023-01-17 DIAGNOSIS — J45.50 SEVERE PERSISTENT ASTHMA, UNSPECIFIED WHETHER COMPLICATED: ICD-10-CM

## 2023-01-19 RX ORDER — BUDESONIDE 0.5 MG/2ML
0.5 INHALANT ORAL 2 TIMES DAILY
Qty: 60 ML | Refills: 3 | Status: SHIPPED | OUTPATIENT
Start: 2023-01-19

## 2023-01-20 DIAGNOSIS — F32.1 CURRENT MODERATE EPISODE OF MAJOR DEPRESSIVE DISORDER WITHOUT PRIOR EPISODE (HCC): ICD-10-CM

## 2023-01-20 RX ORDER — ESCITALOPRAM OXALATE 20 MG/1
TABLET ORAL
Qty: 90 TABLET | Refills: 0 | Status: SHIPPED | OUTPATIENT
Start: 2023-01-20

## 2023-01-23 ENCOUNTER — TELEPHONE (OUTPATIENT)
Dept: FAMILY MEDICINE CLINIC | Facility: HOSPITAL | Age: 63
End: 2023-01-23

## 2023-01-23 DIAGNOSIS — Z79.899 ENCOUNTER FOR LONG-TERM (CURRENT) DRUG USE: ICD-10-CM

## 2023-01-23 NOTE — TELEPHONE ENCOUNTER
MAUREEN NEEDS A REFILL ON HER LORAZEPAM - HAS ENOUGH UNTIL Thursday    [facility] MALATHI

## 2023-01-24 ENCOUNTER — PATIENT OUTREACH (OUTPATIENT)
Dept: FAMILY MEDICINE CLINIC | Facility: HOSPITAL | Age: 63
End: 2023-01-24

## 2023-01-24 NOTE — PROGRESS NOTES
Outpatient Care Management Note:    Care manager called Ivy  She states that she started the increased dose of victoza  Her sugars are in the 300s  She will continue to send her sugars to endocrine every 2 weeks  She is scheduled with endocrine on 2/8  Ivy is aware that she needs to complete her endocrine blood work    We discussed scheduling her eye exam  Ivy agreed to call  We also discussed monitoring her diet closely  Ivy states she is eating more protein like fish and chicken  We discussed eating 3 bigger meals and decreasing her snacking  She denies drinking any sugary drinks  CM encouraged her to start a food diary to record everything she eats  We discussed how recording her food intake may work to "hold her accountable" for her food choices and quantities  Ivy states her diarrhea symptoms have continued to improve  She is uncertain how often the diarrhea is occurring  She is aware that she should call GI to schedule a follow up  Ivy continues on her oxygen at 4 L  She denies any increased shortness of breath  Ivy agreed to follow up in 4 weeks  She will call sooner with any questions

## 2023-01-25 RX ORDER — LORAZEPAM 0.5 MG/1
TABLET ORAL
Qty: 90 TABLET | Refills: 0 | Status: SHIPPED | OUTPATIENT
Start: 2023-01-25

## 2023-02-07 LAB
ALBUMIN SERPL-MCNC: 4.2 G/DL (ref 3.8–4.8)
ALBUMIN/GLOB SERPL: 1.8 {RATIO} (ref 1.2–2.2)
ALP SERPL-CCNC: 166 IU/L (ref 44–121)
ALT SERPL-CCNC: 42 IU/L (ref 0–32)
AST SERPL-CCNC: 27 IU/L (ref 0–40)
BILIRUB SERPL-MCNC: 0.3 MG/DL (ref 0–1.2)
BUN SERPL-MCNC: 11 MG/DL (ref 8–27)
BUN/CREAT SERPL: 17 (ref 12–28)
CALCIUM SERPL-MCNC: 9.4 MG/DL (ref 8.7–10.3)
CHLORIDE SERPL-SCNC: 92 MMOL/L (ref 96–106)
CO2 SERPL-SCNC: 30 MMOL/L (ref 20–29)
CREAT SERPL-MCNC: 0.65 MG/DL (ref 0.57–1)
EGFR: 98 ML/MIN/1.73
EST. AVERAGE GLUCOSE BLD GHB EST-MCNC: 237 MG/DL
GLOBULIN SER-MCNC: 2.3 G/DL (ref 1.5–4.5)
GLUCOSE SERPL-MCNC: 193 MG/DL (ref 70–99)
HBA1C MFR BLD: 9.9 % (ref 4.8–5.6)
POTASSIUM SERPL-SCNC: 4.2 MMOL/L (ref 3.5–5.2)
PROT SERPL-MCNC: 6.5 G/DL (ref 6–8.5)
SODIUM SERPL-SCNC: 137 MMOL/L (ref 134–144)
T4 FREE SERPL-MCNC: 1.25 NG/DL (ref 0.82–1.77)
TSH SERPL DL<=0.005 MIU/L-ACNC: 3.36 UIU/ML (ref 0.45–4.5)

## 2023-02-08 ENCOUNTER — OFFICE VISIT (OUTPATIENT)
Dept: ENDOCRINOLOGY | Facility: HOSPITAL | Age: 63
End: 2023-02-08

## 2023-02-08 VITALS
DIASTOLIC BLOOD PRESSURE: 70 MMHG | WEIGHT: 280 LBS | HEIGHT: 63 IN | SYSTOLIC BLOOD PRESSURE: 112 MMHG | HEART RATE: 72 BPM | OXYGEN SATURATION: 91 % | BODY MASS INDEX: 49.61 KG/M2

## 2023-02-08 DIAGNOSIS — Z79.4 TYPE 2 DIABETES MELLITUS WITH STAGE 2 CHRONIC KIDNEY DISEASE, WITH LONG-TERM CURRENT USE OF INSULIN (HCC): Primary | ICD-10-CM

## 2023-02-08 DIAGNOSIS — N18.2 TYPE 2 DIABETES MELLITUS WITH STAGE 2 CHRONIC KIDNEY DISEASE, WITH LONG-TERM CURRENT USE OF INSULIN (HCC): Primary | ICD-10-CM

## 2023-02-08 DIAGNOSIS — E11.22 TYPE 2 DIABETES MELLITUS WITH STAGE 2 CHRONIC KIDNEY DISEASE, WITH LONG-TERM CURRENT USE OF INSULIN (HCC): Primary | ICD-10-CM

## 2023-02-08 NOTE — PROGRESS NOTES
Kayce Avila 57 y o  female MRN: [id number]    Encounter: [id number]      Assessment/Plan     Assessment: This is a 57y o -year-old female with 2 diabetes with neuropathy, chronic kidney disease, and hyperlipidemia  Plan:  1  Type 2 diabetes, insulin requiring: Recent hemoglobin A1c was 9 9  Review of her Dexcom she does have the continued this with increase blood sugars after breakfast   At this time were going to increase her Lantus to 30 units daily  Her Humalog will be 24 units with breakfast and continue with 12 units with lunch and dinner  Would like to see a download in about 2 weeks  We will continue with the same dose of Victoza and metformin  Contact the office with any concerns or questions  Follow-up in 3 months with lab work completed prior to visit      Patient is type 2 diabetic currently utilizing 4 or more insulin injections a day   She is checking her blood sugar for more times a day, and adjusting insulin doses according to current glucose levels   Because of this, it would be beneficial for her to continue utilizing a Dexcom continuous glucose monitor      2  Diabetic neuropathy:  Stable  Diabetic foot exam is up-to-date        3  History of stage 2 chronic kidney disease:  Kidney functions stable  Chlorides are low but stable  Possibly due to COPD      4  Hyperlipidemia:  Most recent lipid panel looked excellent   Continue with current medication  We will continue to monitor over time  CC: Type 2 diabetes follow-up    History of Present Illness     HPI:  Mattie ENRICO Rufino is a 59year old female with type 2 diabetes, insulin requiring for 7 year, hyperlipidemia for follow-up    She was placed on insulin in June 2021 when her hemoglobin A1c went up markedly   She admits to a poor diet   She can do very well at times but has "no discipline"   She reportedly did try Januvia and Jardiance in the past but had problems with side effects on these medications   She is on oral agents and insulin at home and takes glimepiride 8 mg with breakfast, metformin  mg 2 tablets twice a day, and Lantus insulin 28 units at bedtime, Humalog 20 units with breakfast and 12 units with lunch and dinner plus sliding scale, Victoza 1 2 mg daily  At times is not feel like she is getting the injection at all  She admits to polyuria, polydipsia, polyphagia, nocturia 3-4 times a night, and blurry vision  Rain Chester has unusual sensations of her feet with painful sensations of her feet   She denies chest pain but has shortness of breath with her lung disease   She is on oxygen 24 hours a day  She denies retinopathy, heart attack, stroke and claudication but does admit to neuropathy and nephropathy  Continues to have some depression  No lifestyle changes since last office visit  Continues with poor dietary choices, and then snacking throughout the day      Has followed up with Diabetes Education November 2021      Hypoglycemic episodes: No never  H/o of hypoglycemia causing hospitalization or intervention such as glucagon injection  or ambulance call  No   Hypoglycemia symptoms: never had one  Treatment of hypoglycemia: would eat something sweet or sugar  Glucagon:No   Medic alert tag: recommended,Yes       The patient's last eye exam was at June 2022  [place] patient's last foot exam was in with podiatry, Dr Tanya Camargo last saw Stewart Braeden several weeks ago and does every 3 months   Last diabetic foot exam completed in the office was November 2022      Blood Sugar/Glucometer/Pump/CGM review: Download of Dexcom from January 26 through February 8, 2023 reveals an average glucose level of 271 with a standard deviation of 72    Overnight blood sugar levels are doing well with little variability, but with breakfast glucose levels trend upward significantly, and remain high throughout the day with a lot of variability       She has hyperlipidemia and takes atorvastatin 40 mg daily   She denies chest pain but has rare palpitations  Review of Systems   Constitutional: Positive for fatigue  Negative for activity change, appetite change and unexpected weight change  HENT: Negative for sore throat and trouble swallowing  Eyes: Negative for visual disturbance  Respiratory: Positive for shortness of breath (On portable oxygen)  Negative for chest tightness  Cardiovascular: Negative for chest pain, palpitations and leg swelling  Gastrointestinal: Negative for abdominal pain, constipation, diarrhea, nausea and vomiting  Endocrine: Positive for polydipsia, polyphagia and polyuria  Negative for cold intolerance and heat intolerance  Genitourinary: Negative for frequency  Nocturia   Musculoskeletal: Positive for gait problem (Utilizes wheelchair)  Skin: Negative for wound  Neurological: Positive for numbness ( bilateral feet)  Negative for dizziness, weakness and headaches  Psychiatric/Behavioral: Positive for dysphoric mood and sleep disturbance  The patient is not nervous/anxious  Historical Information   Past Medical History:   Diagnosis Date   • Acute blood loss anemia 6/1/2021   • Acute diverticulitis 5/2/2021   • Alcohol abuse 5/21/2020   • Anemia    • Atrial fibrillation [facility])    • Cervical radiculopathy    • CHF (congestive heart failure) ([facility])    • Chronic low back pain    • Chronic obstructive asthma ([facility] [address] ) 2/20/2018   • Cigarette nicotine dependence without complication 62/58/9385    Quit 12/10/2019      • Community acquired pneumonia 5/21/2020   • Depression with anxiety 3/9/2014   • Diabetes mellitus with hyperglycemia ([facility] [address] )    • Diverticulitis 6/6/2021   • Elevated liver enzymes    • GERD (gastroesophageal reflux disease)    • Hypersomnolence 10/28/2020   • Hypokalemia 12/4/2018   • Paresthesia of upper extremity    • Plantar fasciitis    • Restless legs syndrome 4/8/2014   • Sexual dysfunction    • Sleep apnea, obstructive    • Tenosynovitis of finger    • Tinea corporis    • Tobacco use 2018    Currently smoking 3-4 cigarettes daily   • Trigger middle finger of left hand 2017   • Trigger ring finger of left hand 2017   • Weakness of upper extremity      Past Surgical History:   Procedure Laterality Date   • ABDOMINAL SURGERY     • CARPAL TUNNEL RELEASE Bilateral    •  SECTION     • CHOLECYSTECTOMY      laparoscopic   • ESOPHAGOGASTRODUODENOSCOPY N/A 12/3/2018    Procedure: ESOPHAGOGASTRODUODENOSCOPY (EGD) AND COLONOSCOPY;  Surgeon: Michael Blanton MD;  Location: [facility];  Service: Gastroenterology   • GASTRIC BYPASS      for morbid obesity with gilda en y   • HERNIA REPAIR     • HYSTERECTOMY     • MI EXCISION GANGLION WRIST DORSAL/VOLAR PRIMARY Left 2017    Procedure: LONG FINGER GANGLION CYST EXCISION;  Surgeon: Naty Villarreal MD;  [facility];  Service: Orthopedics   • MI TENDON SHEATH INCISION Left 2017    Procedure: Katie Srivastava, RING, TRIGGER FINGER RELEASE;  Surgeon: Naty Villarreal MD;  [facility]: [facility];  Service: Orthopedics   • SHOULDER SURGERY Right      Social History   Social History     Substance and Sexual Activity   Alcohol Use Not Currently   • Alcohol/week: 20 0 standard drinks   • Types: 20 Cans of beer per week    Comment: about 6 coors light every night, stopped in 2019     Social History     Substance and Sexual Activity   Drug Use No     Social History     Tobacco Use   Smoking Status Former   • Packs/day: 0 25   • Years: 29 00   • Pack years: 7 25   • Types: Cigarettes   • Start date: 200   • Quit date: 12/10/2019   • Years since quitting: 3 1   Smokeless Tobacco Never     Family History:   Family History   Problem Relation Age of Onset   • Alzheimer's disease Mother    • Atrial fibrillation Mother    • Dementia Mother    • Heart disease Mother         heart problem   • Seizures Mother    • Parkinsonism Father    • Arthritis Father    • Atrial fibrillation Father    • No Known Problems Daughter    • Cri-du-chat syndrome Daughter    • Colon cancer Maternal Grandmother [de-identified]   • Diabetes type II Maternal Grandmother    • No Known Problems Brother    • No Known Problems Son    • Substance Abuse Neg Hx         mother,father   • Mental illness Neg Hx         mother,father   • Colon polyps Neg Hx        Meds/Allergies   Current Outpatient Medications   Medication Sig Dispense Refill   • albuterol (2 5 mg/3 mL) 0 083 % nebulizer solution Take 3 mL (2 5 mg total) by nebulization every 6 (six) hours as needed for wheezing or shortness of breath 1080 mL 3   • albuterol (ProAir HFA) 90 mcg/act inhaler Inhale 2 puffs every 6 (six) hours as needed for wheezing or shortness of breath 8 5 g 3   • albuterol (PROVENTIL HFA,VENTOLIN HFA) 90 mcg/act inhaler Inhale 2 puffs every 4 (four) hours as needed for wheezing 1 Inhaler 4   • atorvastatin (LIPITOR) 40 mg tablet TAKE 1 TABLET BY MOUTH EVERY DAY 30 tablet 5   • Benralizumab 30 MG/ML SOAJ Inject 1 mL under the skin every 28 days 1 mL 2   • Benralizumab 30 MG/ML SOAJ Inject 30 mg under the skin every 28 days for 3 doses 1 mL 2   • Benralizumab 30 MG/ML SOAJ Inject 30 mg under the skin every 56 days 1 mL 6   • Blood Glucose Monitoring Suppl ([email or web address] CONTOUR NEXT MONITOR) w/Device KIT Use daily     • budesonide (PULMICORT) 0 5 mg/2 mL nebulizer solution TAKE 2 ML (0 5 MG TOTAL) BY NEBULIZATION 2 (TWO) TIMES A DAY RINSE MOUTH AFTER USE   60 mL 3   • Contour Next Test test strip USE TO TEST BLOOD SUGAR 3 TIMES A  strip 3   • [employer] Lancets Thin 26G MISC USE 3 (THREE) TIMES A  each 5   • Eliquis 5 MG TAKE 1 TABLET BY MOUTH TWICE A DAY 60 tablet 5   • escitalopram (LEXAPRO) 20 mg tablet TAKE 1 TABLET BY MOUTH EVERY DAY 90 tablet 0   • glycopyrrolate-formoterol (BEVESPI AEROSPHERE) 9-4 8 MCG/ACT inhaler Inhale 2 puffs 2 (two) times a day 10 7 g 5   • Insulin Glargine Solostar (Lantus SoloStar) 100 UNIT/ML SOPN Inject 0 28 mL (28 Units total) under the skin daily at bedtime 30 mL 1   • insulin lispro (HumaLOG) 100 units/mL injection pen INJECT 12 UNITS UNDER THE SKIN 3 (THREE) TIMES A DAY WITH MEALS (Patient taking differently: 20 UNITS BREAKFAST 12 UNITS AT LUNCH AND DINNER) 15 mL 3   • Insulin Pen Needle (BD Pen Needle Love 2nd Gen) 32G X 4 MM MISC Use daily at bedtime Use 4 new needles daily   400 each 3   • levalbuterol (XOPENEX) 1 25 mg/0 5 mL nebulizer solution Take 0 5 mL (1 25 mg total) by nebulization 2 (two) times a day 60 vial 0   • liraglutide (VICTOZA) injection Inject 0 2 mL (1 2 mg total) under the skin daily 9 mL 4   • loratadine (CLARITIN) 10 mg tablet Take by mouth     • LORazepam (ATIVAN) 0 5 mg tablet TAKE 2 TABLETS BY MOUTH AT BEDTIME AND 1 EVERY 6 HOURS AS NEEDED FOR ANXIETY 90 tablet 0   • metFORMIN (GLUCOPHAGE-XR) 500 mg 24 hr tablet TAKE 2 TABLETS BY MOUTH TWICE A DAY WITH FOOD 360 tablet 1   • metolazone (ZAROXOLYN) 5 mg tablet Take 1 tablet (5 mg total) by mouth daily 5 tablet 0   • metoprolol succinate (TOPROL-XL) 100 mg 24 hr tablet Take 1 tablet (100 mg total) by mouth daily 30 tablet 11   • montelukast (SINGULAIR) 10 mg tablet TAKE 1 TABLET BY MOUTH DAILY AT BEDTIME 90 tablet 3   • naloxone (NARCAN) 4 mg/0 1 mL nasal spray Administer 1 spray into a nostril  If breathing does not return to normal or if breathing difficulty resumes after 2-3 minutes, give another dose in the other nostril using a new spray   1 each 1   • omeprazole (PriLOSEC) 40 MG capsule Take 1 capsule (40 mg total) by mouth daily 90 capsule 3   • potassium chloride (Klor-Con M20) 20 mEq tablet Take 1 tablet (20 mEq total) by mouth 2 (two) times a day 60 tablet 5   • torsemide (DEMADEX) 20 mg tablet Take 3 tablets (60 mg total) by mouth 2 (two) times a day 540 tablet 0   • traZODone (DESYREL) 150 mg tablet TAKE 1 TABLET BY MOUTH EVERYDAY AT BEDTIME 90 tablet 0   • ascorbic acid (VITAMIN C) 1000 MG tablet Take 1,000 mg by mouth daily (Patient not taking: Reported on 9/29/2022)     • Cholecalciferol 1000 units tablet Take 1,000 Units by mouth daily (Patient not taking: Reported on 2/8/2023)     • cyanocobalamin (VITAMIN B-12) 100 mcg tablet Take by mouth daily (Patient not taking: Reported on 2/8/2023)     • EPINEPHrine (EPIPEN) 0 3 mg/0 3 mL SOAJ Inject 0 3 mL (0 3 mg total) into a muscle once for 1 dose 0 6 mL 0   • ferrous sulfate 220 (44 Fe) mg/5 mL solution TAKE 5 ML (220 MG TOTAL) BY MOUTH 2 (TWO) TIMES A DAY BEFORE MEALS (Patient not taking: Reported on 9/29/2022) 473 mL 2   • fluticasone (FLONASE) 50 mcg/act nasal spray 1 spray into each nostril 2 (two) times a day (Patient not taking: Reported on 2/8/2023) 1 Bottle 3     No current facility-administered medications for this visit  Allergies   Allergen Reactions   • Iron Dextran Swelling   • Januvia [Sitagliptin] Shortness Of Breath   • Jardiance [Empagliflozin] Shortness Of Breath   • Clonazepam Other (See Comments)     Unknown reaction   • Codeine Itching and Other (See Comments)     itch;mary kay morphine,takes ultram @home   • Adhesive [Medical Tape] Itching     itching   • Effexor [Venlafaxine] Itching   • Lactose - Food Allergy GI Intolerance   • Oxycodone-Acetaminophen Anxiety   • Oxycodone-Acetaminophen Itching and Rash     Other reaction(s): Other (See Comments)  (PERCOCET) MILD RASH, not allergic to Acetaminophen        Objective   Vitals: Blood pressure 112/70, pulse 72, height 5' 3" (1 6 m), weight 127 kg (280 lb), SpO2 91 %, not currently breastfeeding  Physical Exam  Vitals and nursing note reviewed  Constitutional:       General: She is not in acute distress  Appearance: Normal appearance  She is not diaphoretic  HENT:      Head: Normocephalic and atraumatic  Eyes:      General: No scleral icterus  Extraocular Movements: Extraocular movements intact  Conjunctiva/sclera: Conjunctivae normal       Pupils: Pupils are equal, round, and reactive to light  Cardiovascular:      Rate and Rhythm: Normal rate and regular rhythm        Heart sounds: No murmur heard  Pulmonary:      Effort: Pulmonary effort is normal  No respiratory distress  Breath sounds: Decreased breath sounds present  No wheezing  Comments: On portable oxygen  Musculoskeletal:      Cervical back: Normal range of motion  Right lower leg: No edema  Left lower leg: No edema  Lymphadenopathy:      Cervical: No cervical adenopathy  Neurological:      Mental Status: She is alert and oriented to person, place, and time  Mental status is at baseline  Sensory: No sensory deficit  Motor: Weakness: Utilizing wheelchair  Gait: Gait abnormal    Psychiatric:         Mood and Affect: Mood normal          Behavior: Behavior normal          Thought Content: Thought content normal          The history was obtained from the review of the chart, patient      Lab Results:   Lab Results   Component Value Date/Time    Hemoglobin A1C 9 9 (H) 02/06/2023 11:38 AM    Hemoglobin A1C 9 5 (H) 10/25/2022 10:25 AM    Hemoglobin A1C 10 1 (A) 07/27/2022 01:07 PM    Hemoglobin A1C 11 8 (A) 03/23/2022 01:18 PM    WBC 11 00 (H) 02/10/2022 01:42 PM    White Blood Cell Count 13 9 (H) 12/01/2022 01:40 PM    Hemoglobin 11 0 (L) 12/01/2022 01:40 PM    Hemoglobin 12 0 02/10/2022 01:42 PM    Hgb, i-STAT 12 6 09/27/2022 01:13 PM    HCT 36 8 12/01/2022 01:40 PM    Hematocrit 41 6 02/10/2022 01:42 PM    Hct, i-STAT 37 09/27/2022 01:13 PM    MCV 74 (L) 12/01/2022 01:40 PM    MCV 80 (L) 02/10/2022 01:42 PM    Platelet Count 202 12/15/4129 01:40 PM    Platelets 669 77/48/1053 01:42 PM    BUN 11 02/06/2023 11:38 AM    BUN 10 10/25/2022 10:25 AM    BUN 10 10/25/2022 10:24 AM    Potassium 4 2 02/06/2023 11:38 AM    Potassium 4 6 10/25/2022 10:25 AM    Potassium 4 6 10/25/2022 10:24 AM    Chloride 92 (L) 02/06/2023 11:38 AM    Chloride 93 (L) 10/25/2022 10:25 AM    Chloride 92 (L) 10/25/2022 10:24 AM    CO2 30 (H) 02/06/2023 11:38 AM    CO2 32 (H) 10/25/2022 10:25 AM    CO2 34 (H) 10/25/2022 10:24 AM    Creatinine 0 65 02/06/2023 11:38 AM    Creatinine 0 63 10/25/2022 10:25 AM    Creatinine 0 62 10/25/2022 10:24 AM    Creatinine 0 76 02/10/2022 01:42 PM    AST 27 02/06/2023 11:38 AM    AST 31 10/25/2022 10:25 AM    AST 16 02/10/2022 01:42 PM    ALT 42 (H) 02/06/2023 11:38 AM    ALT 45 (H) 10/25/2022 10:25 AM    ALT 32 02/10/2022 01:42 PM    Albumin 4 2 02/06/2023 11:38 AM    Albumin 4 3 10/25/2022 10:25 AM    Albumin 3 6 02/10/2022 01:42 PM    Globulin, Total 2 3 02/06/2023 11:38 AM    Globulin, Total 2 1 10/25/2022 10:25 AM    HDL 48 10/25/2022 10:25 AM    Triglycerides 126 10/25/2022 10:25 AM         Portions of the record may have been created with voice recognition software  Occasional wrong word or "sound a like" substitutions may have occurred due to the inherent limitations of voice recognition software  Read the chart carefully and recognize, using context, where substitutions have occurred

## 2023-02-08 NOTE — PATIENT INSTRUCTIONS
Monitor diet  Make sure to drink plenty water throughout the day  Continue metformin 1000 mg twice a day, Victoza and glimepiride 8 mg with breakfast      Change Lantus to 30 units daily  Change Novolog to 24 units with breakfast, and continue 12 units at lunch and dinner  Contact the office with any concerns or questions  Follow-up in 3 months with lab work completed prior to visit

## 2023-02-13 ENCOUNTER — TELEPHONE (OUTPATIENT)
Dept: PULMONOLOGY | Facility: CLINIC | Age: 63
End: 2023-02-13

## 2023-02-13 NOTE — TELEPHONE ENCOUNTER
Called pt back advised her that the medication is at the specialty pharmacy she cant not get it at the regular pharmacy  Patient is going tot call [facility] speciality and get medication delivered  Problem: Mobility Impaired (Adult and Pediatric)  Goal: *Acute Goals and Plan of Care (Insert Text)  Outcome: Resolved/Met Date Met:  08/10/17  PHYSICAL THERAPY EVALUATION AND DISCHARGE     Patient: Stephanie Espinoza (51 y.o. female)  Date: 8/10/2017  Primary Diagnosis: hnp m50.20  HNP (herniated nucleus pulposus), cervical  Procedure(s) (LRB):  REMOVE C-LARRY EXPLORE FUSION, ANTERIOR CERVICAL DISKECTOMY AND FUSION, BONE BANK BONE GRAFT PLATE W3-8 (N/A) 1 Day Post-Op   Precautions: Fall, Spinal      ASSESSMENT AND RECOMMENDATIONS:  Based on the objective data described below, the patient presents with mobility level appropriate for discharge to home set-up. Encountered patient in restroom; verbalized she should have called for help but did not. Cervical collar donned. Educated on safety with mobility in hospital; verbalized understanding. Washed hands at sink with good balance. Stand to sit to EOB to don socks; independently completed. Amb 340 independent without LOB; gait steady. Completed 10 stairs mod I.  Educated on cervical precautions; discussed compensations; verbalized understanding. Seated at EOB for breakfast; call cuellar in reach; GARO Jacob notified. Voiced no concerns about home mobility at this time. Skilled physical therapy is not indicated at this time.   Discharge Recommendations: None  Further Equipment Recommendations for Discharge: N/A      Recommendations for nursing: Up ad robbin; up for meals  Verbally communicated to: GARO Mills:   Agreeable to PT      OBJECTIVE DATA SUMMARY:       Past Medical History:   Diagnosis Date    Diabetes ([facility] [address]      Hypertension       Past Surgical History:   Procedure Laterality Date    HX CERVICAL FUSION   2008    HX CHOLECYSTECTOMY   1986    HX GASTRIC BYPASS   2007    HX HYSTERECTOMY   2001     Has left ovary     Barriers to Learning/Limitations: None  Compensate with: visual, verbal, tactile, kinesthetic cues/model     G CODE:  Mobility [id number] Current CJ= 20-39%  D/C  CJ= 20-39%. The severity rating is based on the Other [facility] Balance Scale 4/5     [employer] Standing Balance Scale 4/5  0: Pt performs 25% or less of standing activity (Max assist) CN, 100% impaired. 1: Pt supports self with upper extremities but requires therapist assistance. Pt performs 25-50% of effort (Mod assist) CM, 80% to <100% impaired. 1+: Pt supports self with upper extremities but requires therapist assistance. Pt performs >50% effort. (Min assist). CL, 60% to <80% impaired. 2: Pt supports self independently with both upper extremities (walker, crutches, parallel bars). CL, 60% to <80% impaired. 2+: Pt support self independently with 1 upper extremity (cane, crutch, 1 parallel bar). CK, 40% to <60% impaired. 3: Pt stands without upper extremity support for up to 30 seconds. CK, 40% to <60% impaired. 3+: Pt stands without upper extremity support for 30 seconds or greater. CJ, 20% to <40% impaired. 4: Pt independently moves and returns center of gravity 1-2 inches in one plane. CJ, 20% to <40% impaired. 4+: Pt independently moves and returns center of gravity 1-2 inches in multiple planes. CI, 1% to <20% impaired. 5: Pt independently moves and returns center of gravity in all planes greater than 2 inches. CH, 0% impaired.      Eval Complexity: History: MEDIUM  Complexity : 1-2 comorbidities / personal factors will impact the outcome/ POC Exam:MEDIUM Complexity : 3 Standardized tests and measures addressing body structure, function, activity limitation and / or participation in recreation  Presentation: MEDIUM Complexity : Evolving with changing characteristics  Clinical Decision Making:Medium Complexity [facility] Standing Balance Scale 4/5 Overall Complexity:MEDIUM     Prior Level of Function/Home Situation:   Home Situation  Home Environment: Private residence  # Steps to Enter: 2  One/Two Story Residence: Two story  Living Alone: No  Support Systems: Spouse/Significant Other/Partner, Friends \ neighbors  Patient Expects to be Discharged to[de-identified] Private residence  Current DME Used/Available at Home: None  Critical Behavior:  Neurologic State: Alert  Orientation Level: Oriented X4  Cognition: Appropriate decision making; Follows commands  Safety/Judgement: Awareness of environment; Fall prevention  Psychosocial  Patient Behaviors: Cooperative;Calm  Purposeful Interaction: Yes  Pt Identified Daily Priority: Clinical issues (comment)  Caritas Process: Attend basic human needs  Caring Interventions: Reassure  Reassure: Caring rounds  Strength:    Strength: Within functional limits (BLE)  Tone & Sensation:   Tone: Normal  Sensation: Intact (BLE)  Range Of Motion:  AROM: Within functional limits (BLE)  Functional Mobility:  Bed Mobility:  Supine to Sit:  (in bathroom)  Sit to Supine:  (seated at EOB)  Transfers:  Sit to Stand: Modified independent  Stand to Sit: Modified independent  Balance:   Sitting: Intact  Standing: Impaired  Standing - Static: Good  Standing - Dynamic : Good  Ambulation/Gait Training:  Distance (ft): 340 Feet (ft)  Assistive Device:  (none)  Ambulation - Level of Assistance: Independent  Gait Description (WDL): Exceptions to WDL  Therapeutic Exercises:   Amb  Stairs  Pain:  Pain Scale 1: Numeric (0 - 10)  Pain Intensity 1: 1  Pain Location 1: Neck  Pain Orientation 1: Left  Pain Description 1: Sore;Aching  Pain Intervention(s) 1: Medication (see MAR)  Activity Tolerance:   Good  Please refer to the flowsheet for vital signs taken during this treatment.   After treatment:   [ ]         Patient left in no apparent distress sitting up in chair  [X]         Patient left in no apparent distress in bed  [X]         Call bell left within reach  [X]         Nursing notified  [ ]         Caregiver present  [ ]         Bed alarm activated      COMMUNICATION/EDUCATION:   [X]         Fall prevention education was provided and the patient/caregiver indicated understanding. [X]         Patient/family have participated as able in goal setting and plan of care. [X]         Patient/family agree to work toward stated goals and plan of care. [ ]         Patient understands intent and goals of therapy, but is neutral about his/her participation. [ ]         Patient is unable to participate in goal setting and plan of care.      Thank you for this referral.  Christa Morales, PT   Time Calculation: 13 mins

## 2023-02-13 NOTE — TELEPHONE ENCOUNTER
Patient left a voicemail in regards to the Benralizumab that was ordered for her  She states initially the orders were placed incorrectly but her preferred pharmacy is [facility] on [address] [facility] & they are waiting for a call from us to send patient her medication  Please advise

## 2023-02-14 DIAGNOSIS — E11.65 TYPE 2 DIABETES MELLITUS WITH HYPERGLYCEMIA, UNSPECIFIED WHETHER LONG TERM INSULIN USE (HCC): ICD-10-CM

## 2023-02-14 RX ORDER — PERPHENAZINE 16 MG/1
TABLET, FILM COATED ORAL
Qty: 100 STRIP | Refills: 3 | Status: SHIPPED | OUTPATIENT
Start: 2023-02-14

## 2023-02-15 ENCOUNTER — OFFICE VISIT (OUTPATIENT)
Dept: PULMONOLOGY | Facility: HOSPITAL | Age: 63
End: 2023-02-15

## 2023-02-15 VITALS
RESPIRATION RATE: 18 BRPM | TEMPERATURE: 98.7 F | DIASTOLIC BLOOD PRESSURE: 58 MMHG | HEART RATE: 87 BPM | HEIGHT: 63 IN | BODY MASS INDEX: 50.5 KG/M2 | OXYGEN SATURATION: 90 % | SYSTOLIC BLOOD PRESSURE: 102 MMHG | WEIGHT: 285 LBS

## 2023-02-15 DIAGNOSIS — G47.33 OBSTRUCTIVE SLEEP APNEA: ICD-10-CM

## 2023-02-15 DIAGNOSIS — I50.32 CHRONIC DIASTOLIC CONGESTIVE HEART FAILURE (HCC): ICD-10-CM

## 2023-02-15 DIAGNOSIS — J44.9 ASTHMA-COPD OVERLAP SYNDROME (HCC): Primary | ICD-10-CM

## 2023-02-15 DIAGNOSIS — J96.11 CHRONIC RESPIRATORY FAILURE WITH HYPOXIA AND HYPERCAPNIA (HCC): ICD-10-CM

## 2023-02-15 DIAGNOSIS — I27.20 PULMONARY HYPERTENSION (HCC): ICD-10-CM

## 2023-02-15 DIAGNOSIS — J96.12 CHRONIC RESPIRATORY FAILURE WITH HYPOXIA AND HYPERCAPNIA (HCC): ICD-10-CM

## 2023-02-15 DIAGNOSIS — E66.01 MORBID OBESITY (HCC): ICD-10-CM

## 2023-02-15 DIAGNOSIS — J82.83 EOSINOPHILIC ASTHMA: ICD-10-CM

## 2023-02-15 RX ORDER — EPINEPHRINE 0.3 MG/.3ML
0.3 INJECTION SUBCUTANEOUS ONCE
Qty: 0.6 ML | Refills: 1 | Status: SHIPPED | OUTPATIENT
Start: 2023-02-15 | End: 2023-02-15

## 2023-02-15 NOTE — PROGRESS NOTES
Assessment & Plan:      1  Asthma-COPD overlap syndrome [facility])  Ambulatory Referral to Pulmonary Rehabilitation      2  Eosinophilic asthma  EPINEPHrine (EPIPEN) 0 3 mg/0 3 mL SOAJ    Ambulatory Referral to Pulmonary Rehabilitation      3  Morbid obesity ([facility] )        4  Chronic respiratory failure with hypoxia and hypercapnia ([facility] )  Ambulatory Referral to Pulmonary Rehabilitation      5  Obstructive sleep apnea        6  Pulmonary hypertension ([place] [address] )        7  Chronic diastolic congestive heart failure [facility])            • Patient presenting for follow-up, respiratory symptoms have been stable  She has had gradual worsening over time of her shortness of breath with exertion  • Complete PFTs consistent with very severe COPD also with bronchodilator responsiveness which goes along with her asthmatic component  She has elevated eosinophil count and would benefit from biologic agent such as Fasenra  She received her Sasha Hewitt yesterday and will start home injection therapy ASAP  She was provided education on proper use and technique and reminded to have her EpiPen on hand in case of emergency  • We may also consider endobronchial valve placement in the future however she was previously noted to have elevated right ventricular pressure on echocardiogram   Her BMI would also need to be less than 35 and is currently over 50  She is following with weight management  Weight loss will also help with her breathing  • I again advised her that I strongly recommend pulmonary rehab  She remains somewhat hesitant but was OK with me placing referral    • Continue Pulmicort via nebulizer twice daily, Bevespi, Singulair, Claritin, p r n   Albuterol  • She is using her CPAP and benefitting from it  • She remains on 4 L of oxygen at all times  • Continues to follow-up with cardiology and maintained on diuretics  • She says she may see a second opinion at [address] [facility] told her that is completely fine      Patient is accompanied by her friend/support person  All questions were answered  Subjective:     Patient ID: Norma Jorge is a 57 y o  female  Chief Complaint:  Hong Conrad is a 78-year-old female with past medical history including asthma, COPD, chronic respiratory failure on 4 L of oxygen, WILMA, morbid obesity, CHF, pulmonary hypertension presenting for follow-up  She says that her breathing continues to get worse over time but no changes since her last appointment  She has dyspnea with minimal exertion such as cooking her dinner or washing dishes  She was on prednisone a few months ago and felt better while she was on it but notes her concerns about frequent treatment with steroids  She reports compliance with her diuretics and urinating appropriately  She does not use her rescue inhaler or nebulizer very often  The following portions of the patient's history were reviewed in this encounter and updated as appropriate: Past medical, social, surgical, family, allergies    Review of Systems   Constitutional: Positive for fatigue  Respiratory: Positive for cough and shortness of breath  Cardiovascular: Positive for leg swelling  All other systems reviewed and are negative  Objective:    Physical Exam  Vitals reviewed  Constitutional:       General: She is not in acute distress  Appearance: She is not toxic-appearing  HENT:      Head: Normocephalic and atraumatic  Eyes:      General: No scleral icterus  Cardiovascular:      Rate and Rhythm: Normal rate and regular rhythm  Pulmonary:      Effort: Pulmonary effort is normal       Comments: Diminished breath sounds  Musculoskeletal:      Right lower leg: Edema present  Left lower leg: Edema present  Skin:     General: Skin is warm and dry  Neurological:      General: No focal deficit present  Mental Status: She is alert  Mental status is at baseline     Psychiatric:         Mood and Affect: Mood normal          Behavior: Behavior normal          Lab Review:   No visits with results within 2 Month(s) from this visit     Latest known visit with results is:   Office Visit on 11/15/2022   Component Date Value   • White Blood Cell Count 12/01/2022 13 9 (H)    • Red Blood Cell Count 12/01/2022 4 98    • Hemoglobin 12/01/2022 11 0 (L)    • HCT 12/01/2022 36 8    • MCV 12/01/2022 74 (L)    • MCH 12/01/2022 22 1 (L)    • MCHC 12/01/2022 29 9 (L)    • RDW 12/01/2022 17 0 (H)    • Platelet Count 11/51/5106 298    • Neutrophils 12/01/2022 75    • Lymphocytes 12/01/2022 16    • Monocytes 12/01/2022 6    • Eosinophils 12/01/2022 2    • Basophils PCT 12/01/2022 0    • Neutrophils (Absolute) 12/01/2022 10 3 (H)    • Lymphocytes (Absolute) 12/01/2022 2 2    • Monocytes (Absolute) 12/01/2022 0 9    • Eosinophils (Absolute) 12/01/2022 0 3    • Basophils ABS 12/01/2022 0 1    • Immature Granulocytes 12/01/2022 1    • Immature Granulocytes (A* 12/01/2022 0 2 (H)

## 2023-02-17 ENCOUNTER — DOCUMENTATION (OUTPATIENT)
Dept: ENDOCRINOLOGY | Facility: HOSPITAL | Age: 63
End: 2023-02-17

## 2023-02-17 DIAGNOSIS — G47.00 INSOMNIA, UNSPECIFIED TYPE: ICD-10-CM

## 2023-02-17 RX ORDER — TRAZODONE HYDROCHLORIDE 150 MG/1
TABLET ORAL
Qty: 90 TABLET | Refills: 0 | Status: SHIPPED | OUTPATIENT
Start: 2023-02-17

## 2023-02-17 NOTE — PROGRESS NOTES
Completed CGM physician order faxed to [phone] Responsibility to family and others/Identifies reasons for living/Future oriented

## 2023-02-20 ENCOUNTER — TELEPHONE (OUTPATIENT)
Dept: FAMILY MEDICINE CLINIC | Facility: HOSPITAL | Age: 63
End: 2023-02-20

## 2023-02-20 DIAGNOSIS — Z79.899 ENCOUNTER FOR LONG-TERM (CURRENT) DRUG USE: ICD-10-CM

## 2023-02-20 RX ORDER — LORAZEPAM 0.5 MG/1
TABLET ORAL
Qty: 90 TABLET | Refills: 0 | Status: SHIPPED | OUTPATIENT
Start: 2023-02-20

## 2023-02-20 NOTE — TELEPHONE ENCOUNTER
Pt states she needs a refill for LORazepam (ATIVAN) 0 5 mg tablet sent to [facility] on [address]

## 2023-02-21 ENCOUNTER — PATIENT OUTREACH (OUTPATIENT)
Dept: FAMILY MEDICINE CLINIC | Facility: HOSPITAL | Age: 63
End: 2023-02-21

## 2023-02-21 NOTE — PROGRESS NOTES
Outpatient Care Management Note:    Care manager called lake  She was a little more short of breath on the phone  Once she sat quietly, her breathing improved  Lake is on oxygen 4 L  She states that her breathing was the same when she recently saw pulmonary  Lake will call pulmonary if her breathing worsens  She knows to call 911 if she has significant breathing difficulties that do not improve with rest      Lake states that her blood sugars are averaging between 200-300  She increased her lantus to 30 units daily and is taking humalog 24 units with breakfast and 12 units with lunch and dinner  She will continue to send her blood sugars to endocrine every 2 weeks  Lake would like ongoing outreach  She has my contact information  Cm will follow up in 1 month

## 2023-02-27 DIAGNOSIS — I13.0 HYPERTENSIVE HEART AND CHRONIC KIDNEY DISEASE WITH HEART FAILURE AND STAGE 1 THROUGH STAGE 4 CHRONIC KIDNEY DISEASE, OR CHRONIC KIDNEY DISEASE (HCC): ICD-10-CM

## 2023-02-27 RX ORDER — TORSEMIDE 20 MG/1
60 TABLET ORAL 2 TIMES DAILY
Qty: 540 TABLET | Refills: 1 | Status: SHIPPED | OUTPATIENT
Start: 2023-02-27 | End: 2023-05-28

## 2023-03-01 DIAGNOSIS — N18.2 TYPE 2 DIABETES MELLITUS WITH STAGE 2 CHRONIC KIDNEY DISEASE, WITH LONG-TERM CURRENT USE OF INSULIN (HCC): ICD-10-CM

## 2023-03-01 DIAGNOSIS — N18.2 TYPE 2 DIABETES MELLITUS WITH STAGE 2 CHRONIC KIDNEY DISEASE, WITHOUT LONG-TERM CURRENT USE OF INSULIN (HCC): ICD-10-CM

## 2023-03-01 DIAGNOSIS — E11.22 TYPE 2 DIABETES MELLITUS WITH STAGE 2 CHRONIC KIDNEY DISEASE, WITHOUT LONG-TERM CURRENT USE OF INSULIN (HCC): ICD-10-CM

## 2023-03-01 DIAGNOSIS — Z79.4 TYPE 2 DIABETES MELLITUS WITH STAGE 2 CHRONIC KIDNEY DISEASE, WITH LONG-TERM CURRENT USE OF INSULIN (HCC): ICD-10-CM

## 2023-03-01 DIAGNOSIS — E11.22 TYPE 2 DIABETES MELLITUS WITH STAGE 2 CHRONIC KIDNEY DISEASE, WITH LONG-TERM CURRENT USE OF INSULIN (HCC): ICD-10-CM

## 2023-03-01 RX ORDER — INSULIN GLARGINE 100 [IU]/ML
30 INJECTION, SOLUTION SUBCUTANEOUS
Qty: 30 ML | Refills: 1 | Status: SHIPPED | OUTPATIENT
Start: 2023-03-01

## 2023-03-02 DIAGNOSIS — Z79.4 TYPE 2 DIABETES MELLITUS WITH STAGE 2 CHRONIC KIDNEY DISEASE, WITH LONG-TERM CURRENT USE OF INSULIN (HCC): ICD-10-CM

## 2023-03-02 DIAGNOSIS — E11.22 TYPE 2 DIABETES MELLITUS WITH STAGE 2 CHRONIC KIDNEY DISEASE, WITH LONG-TERM CURRENT USE OF INSULIN (HCC): ICD-10-CM

## 2023-03-02 DIAGNOSIS — N18.2 TYPE 2 DIABETES MELLITUS WITH STAGE 2 CHRONIC KIDNEY DISEASE, WITH LONG-TERM CURRENT USE OF INSULIN (HCC): ICD-10-CM

## 2023-03-02 RX ORDER — INSULIN LISPRO 100 [IU]/ML
INJECTION, SOLUTION INTRAVENOUS; SUBCUTANEOUS
Qty: 15 ML | Refills: 3 | Status: SHIPPED | OUTPATIENT
Start: 2023-03-02

## 2023-03-14 ENCOUNTER — PATIENT OUTREACH (OUTPATIENT)
Dept: FAMILY MEDICINE CLINIC | Facility: HOSPITAL | Age: 63
End: 2023-03-14

## 2023-03-14 NOTE — PROGRESS NOTES
Outpatient Care Management Note:    Care manager called Ivy  She states that her blood sugars have been above 300  Ivy has not been sending her sugars to endocrine  I instructed her to send them to endocrine as soon as possible  They will evaluate her dexcom readings and make adjustments if needed  Ivy is taking lantus 30 unit daily and humalog 24 units with breakfast and 12 units with lunch and dinner  She states that she knows her increased sugars are related to poor food choices  She states that she knows what she should be eating  We discussed that exercise can also help to improve her sugars  Ivy states she gets too short of breath with any exertion to ambulate outside  CM did remind her that she needs to schedule her pulmonary follow up  She is due around 4/15  Ivy continues on her oxygen at 4 liters  Ivy has my contact information and will call with any questions

## 2023-03-15 DIAGNOSIS — Z79.899 ENCOUNTER FOR LONG-TERM (CURRENT) DRUG USE: ICD-10-CM

## 2023-03-16 RX ORDER — LORAZEPAM 0.5 MG/1
TABLET ORAL
Qty: 90 TABLET | Refills: 0 | Status: SHIPPED | OUTPATIENT
Start: 2023-03-16

## 2023-03-29 ENCOUNTER — OFFICE VISIT (OUTPATIENT)
Dept: CARDIOLOGY CLINIC | Facility: CLINIC | Age: 63
End: 2023-03-29

## 2023-03-29 VITALS
SYSTOLIC BLOOD PRESSURE: 108 MMHG | HEIGHT: 63 IN | HEART RATE: 87 BPM | WEIGHT: 290 LBS | DIASTOLIC BLOOD PRESSURE: 58 MMHG | BODY MASS INDEX: 51.38 KG/M2

## 2023-03-29 DIAGNOSIS — E66.9 DIABETES MELLITUS TYPE 2 IN OBESE (HCC): ICD-10-CM

## 2023-03-29 DIAGNOSIS — I48.0 PAROXYSMAL ATRIAL FIBRILLATION (HCC): Primary | ICD-10-CM

## 2023-03-29 DIAGNOSIS — I50.32 CHRONIC DIASTOLIC CONGESTIVE HEART FAILURE (HCC): ICD-10-CM

## 2023-03-29 DIAGNOSIS — I10 ESSENTIAL HYPERTENSION: ICD-10-CM

## 2023-03-29 DIAGNOSIS — E11.69 DIABETES MELLITUS TYPE 2 IN OBESE (HCC): ICD-10-CM

## 2023-03-29 RX ORDER — METOPROLOL SUCCINATE 100 MG/1
100 TABLET, EXTENDED RELEASE ORAL DAILY
Qty: 90 TABLET | Refills: 3 | Status: SHIPPED | OUTPATIENT
Start: 2023-03-29

## 2023-03-29 RX ORDER — ATORVASTATIN CALCIUM 40 MG/1
40 TABLET, FILM COATED ORAL DAILY
Qty: 90 TABLET | Refills: 3 | Status: SHIPPED | OUTPATIENT
Start: 2023-03-29

## 2023-03-29 RX ORDER — POTASSIUM CHLORIDE 20 MEQ/1
20 TABLET, EXTENDED RELEASE ORAL 2 TIMES DAILY
Qty: 180 TABLET | Refills: 3 | Status: SHIPPED | OUTPATIENT
Start: 2023-03-29

## 2023-03-29 NOTE — PROGRESS NOTES
Cardiology Outpatient Follow-Up Note - Maritza Hamilton 57 y o  female MRN: [id number]      Assessment/Plan:  1  Paroxysmal atrial fibrillation (HCC)  She is in sinus rhythm today  Continue metoprolol  mg daily  Continue thromboembolic prophylaxis with Eliquis 5 mg twice daily   - POCT ECG    2  Essential hypertension  Blood pressure at goal   Goal under 130/80  Continue metoprolol  mg daily  - metoprolol succinate (TOPROL-XL) 100 mg 24 hr tablet; Take 1 tablet (100 mg total) by mouth daily  Dispense: 90 tablet; Refill: 3    3  Chronic diastolic congestive heart failure ([address] )  She appears euvolemic, although did slightly gain weight--5 pounds likely due to fluid and salt indiscretion  I advised 1500 cc fluid restriction for 1 to 2 weeks with continue torsemide 60 mg twice daily until weight trends back to mid 280s  Then, return to 2000 cc fluid restriction  Continue potassium 20 mEq twice daily  - potassium chloride (Klor-Con M20) 20 mEq tablet; Take 1 tablet (20 mEq total) by mouth 2 (two) times a day  Dispense: 180 tablet; Refill: 3    4  Diabetes mellitus type 2 in obese (HCC)  - atorvastatin (LIPITOR) 40 mg tablet; Take 1 tablet (40 mg total) by mouth daily  Dispense: 90 tablet; Refill: 3      We will see Mattie Joy back in 3 months for routine follow-up  Subjective:     HPI: Maritza Hamilton is a 57y o  year old female who presented to me initially on 9/9/21 for further evaluation of CHF with preserved LVEF, pulmonary hypertension by echo estimation, and atrial fibrillation on flecainide therapy  She also has chronic hypoxic respiratory failure due to asthma COPD overlap syndrome and is followed by Pulmonology       I referred her for RHC which was performed on 9/30/21  The study showed severely elevated right (RAP 20mmHg) and left (PCWP 30mmHg) sided filling pressure  There was severe post-capillary pulmonary hypertension (PAP 80/38/52) with a PVR of 2 7WU   Cardiac output was normal  The "study was suggestive of primarily group II pulmonary hypertension due to left sided heart disease and elevated filling pressure       After her RHC her torsemide was increased from 40mg BID to 60mg BID  She had not noticed much of a difference  She has significant baseline RICCI  She still rests frequently after minimal exertion due to dyspnea  She has, for some time, done well on torsemide 60 mg twice daily  At her last visit on 12/29/2022 she was about 285 pounds  Today she is 290 pounds and reports sodium and fluid indiscretion  She has started a special therapy for eosinophilic asthma, on injectable biologic, but reports minimal to no improvement  She is due to follow-up with her pulmonologist soon  Cardiac Testing:          EKGs, personally reviewed:  EKG in the office 3/29/2023 shows normal sinus rhythm, 87 bpm, normal axis, normal intervals  Normal study  Relevant Labs & Results:  BMP 2/6/2023 reviewed--K4 2, creatinine 0 65--on torsemide 60 mg twice daily and potassium 20 mEq twice daily      ROS:  Review of Systems:  Review of Systems    Objective:     Vitals:   Vitals:    03/29/23 1348   BP: 108/58   BP Location: Left arm   Patient Position: Sitting   Cuff Size: Large   Pulse: 87   Weight: 132 kg (290 lb)   Height: 5' 3\" (1 6 m)    Body surface area is 2 27 meters squared  Wt Readings from Last 3 Encounters:   03/29/23 132 kg (290 lb)   02/15/23 129 kg (285 lb)   02/08/23 127 kg (280 lb)       Physical Exam:  General: Carl Barrientos is a chronically ill-appearing and morbidly obese female, tachypneic, on nasal cannula oxygen, appears slightly dyspneic at rest  HEENT: moist mucous membranes, EOMI  Neck:  No JVD, supple, trachea midline  Cardiovascular: unremarkable S1/S2, regular rate and rhythm, no murmurs, rubs or gallops  Pulmonary: Increased respiratory effort, tachypnea, clear to auscultation  Abdomen: soft and nondistended  Extremities: No lower extremity edema   Warm and well " perfused extremities  Neuro: no focal motor deficits, AAOx3 (person, place, time)  Psych: Normal mood and affect, cooperative      Medications (at the START of this encounter): Outpatient Medications Prior to Visit   Medication Sig Dispense Refill   • albuterol (2 5 mg/3 mL) 0 083 % nebulizer solution Take 3 mL (2 5 mg total) by nebulization every 6 (six) hours as needed for wheezing or shortness of breath 1080 mL 3   • albuterol (ProAir HFA) 90 mcg/act inhaler Inhale 2 puffs every 6 (six) hours as needed for wheezing or shortness of breath 8 5 g 3   • Benralizumab 30 MG/ML SOAJ Inject 30 mg under the skin every 56 days 1 mL 6   • Blood Glucose Monitoring Suppl (Driblet CONTOUR NEXT MONITOR) w/Device KIT Use daily     • budesonide (PULMICORT) 0 5 mg/2 mL nebulizer solution TAKE 2 ML (0 5 MG TOTAL) BY NEBULIZATION 2 (TWO) TIMES A DAY RINSE MOUTH AFTER USE  60 mL 3   • Contour Next Test test strip USE TO TEST BLOOD SUGAR 3 TIMES A  strip 3   • Eliquis 5 MG TAKE 1 TABLET BY MOUTH TWICE A DAY 60 tablet 5   • EPINEPHrine (EPIPEN) 0 3 mg/0 3 mL SOAJ Inject 0 3 mL (0 3 mg total) into a muscle once for 1 dose 0 6 mL 1   • escitalopram (LEXAPRO) 20 mg tablet TAKE 1 TABLET BY MOUTH EVERY DAY 90 tablet 0   • glycopyrrolate-formoterol (BEVESPI AEROSPHERE) 9-4 8 MCG/ACT inhaler Inhale 2 puffs 2 (two) times a day 10 7 g 5   • Insulin Glargine Solostar (Lantus SoloStar) 100 UNIT/ML SOPN Inject 0 3 mL (30 Units total) under the skin daily at bedtime 30 mL 1   • insulin lispro (HumaLOG) 100 units/mL injection pen 20 UNITS BREAKFAST 12 UNITS AT LUNCH AND DINNER (Patient taking differently: 24 UNITS BREAKFAST 12 UNITS AT LUNCH AND DINNER) 15 mL 3   • Insulin Pen Needle (BD Pen Needle Love 2nd Gen) 32G X 4 MM MISC Use daily at bedtime Use 4 new needles daily    400 each 3   • levalbuterol (XOPENEX) 1 25 mg/0 5 mL nebulizer solution Take 0 5 mL (1 25 mg total) by nebulization 2 (two) times a day 60 vial 0   • liraglutide (VICTOZA) injection Inject 0 2 mL (1 2 mg total) under the skin daily 9 mL 4   • loratadine (CLARITIN) 10 mg tablet Take by mouth     • LORazepam (ATIVAN) 0 5 mg tablet TAKE 2 TABLETS BY MOUTH AT BEDTIME AND 1 EVERY 6 HOURS AS NEEDED FOR ANXIETY 90 tablet 0   • metFORMIN (GLUCOPHAGE-XR) 500 mg 24 hr tablet TAKE 2 TABLETS BY MOUTH TWICE A DAY WITH FOOD 360 tablet 1   • montelukast (SINGULAIR) 10 mg tablet TAKE 1 TABLET BY MOUTH DAILY AT BEDTIME 90 tablet 3   • naloxone (NARCAN) 4 mg/0 1 mL nasal spray Administer 1 spray into a nostril  If breathing does not return to normal or if breathing difficulty resumes after 2-3 minutes, give another dose in the other nostril using a new spray   1 each 1   • omeprazole (PriLOSEC) 40 MG capsule Take 1 capsule (40 mg total) by mouth daily 90 capsule 3   • torsemide (DEMADEX) 20 mg tablet Take 3 tablets (60 mg total) by mouth 2 (two) times a day 540 tablet 1   • traZODone (DESYREL) 150 mg tablet TAKE 1 TABLET BY MOUTH EVERYDAY AT BEDTIME 90 tablet 0   • atorvastatin (LIPITOR) 40 mg tablet TAKE 1 TABLET BY MOUTH EVERY DAY 30 tablet 5   • metoprolol succinate (TOPROL-XL) 100 mg 24 hr tablet Take 1 tablet (100 mg total) by mouth daily 30 tablet 11   • potassium chloride (Klor-Con M20) 20 mEq tablet Take 1 tablet (20 mEq total) by mouth 2 (two) times a day 60 tablet 5   • albuterol (PROVENTIL HFA,VENTOLIN HFA) 90 mcg/act inhaler Inhale 2 puffs every 4 (four) hours as needed for wheezing 1 Inhaler 4   • ascorbic acid (VITAMIN C) 1000 MG tablet Take 1,000 mg by mouth daily (Patient not taking: Reported on 3/29/2023)     • Benralizumab 30 MG/ML SOAJ Inject 1 mL under the skin every 28 days 1 mL 2   • Cholecalciferol 1000 units tablet Take 1,000 Units by mouth daily (Patient not taking: Reported on 2/8/2023)     • [employer] Lancets Thin 26G MISC USE 3 (THREE) TIMES A  each 5   • cyanocobalamin (VITAMIN B-12) 100 mcg tablet Take by mouth daily (Patient not taking: Reported on 2/8/2023)     • "EPINEPHrine (EPIPEN) 0 3 mg/0 3 mL SOAJ Inject 0 3 mL (0 3 mg total) into a muscle once for 1 dose 0 6 mL 0   • ferrous sulfate 220 (44 Fe) mg/5 mL solution TAKE 5 ML (220 MG TOTAL) BY MOUTH 2 (TWO) TIMES A DAY BEFORE MEALS (Patient not taking: Reported on 9/29/2022) 473 mL 2   • fluticasone (FLONASE) 50 mcg/act nasal spray 1 spray into each nostril 2 (two) times a day (Patient not taking: Reported on 2/8/2023) 1 Bottle 3   • metolazone (ZAROXOLYN) 5 mg tablet Take 1 tablet (5 mg total) by mouth daily 5 tablet 0     No facility-administered medications prior to visit  Time Spent:  Total time (face-to-face and non-face-to-face) spent on today's visit was 20 minutes  This includes preparation for the visits (i e  reviewing test results), performance of a medically appropriate history and examination, and orders for medications, tests or other procedures  This time is exclusive of procedures performed and time spent teaching  This note was completed in part utilizing [employer] voice recognition software  Grammatical errors, random word insertion, spelling mistakes, occasional wrong word or \"sound-alike\" substitutions and incomplete sentences may be an occasional consequence of the system secondary to software limitations, ambient noise and hardware issues  At the time of dictation, efforts were made to edit, clarify and /or correct errors  Please read the chart carefully and recognize, using context, where substitutions have occurred  If you have any questions or concerns about the context, text or information contained within the body of this dictation, please contact myself, the provider, for further clarification      "

## 2023-04-04 ENCOUNTER — PATIENT OUTREACH (OUTPATIENT)
Dept: FAMILY MEDICINE CLINIC | Facility: HOSPITAL | Age: 63
End: 2023-04-04

## 2023-04-04 NOTE — PROGRESS NOTES
Outpatient Care Management Note:    Care manager called Ivy  She has not sent her blood sugars to endocrine as requested  She again stated she would do it  She has an appt with Dr Milan Matthews on 4/12  Her friend agreed to transport her  The endocrinologist is in the same building, so she will have her dexcom downloaded  Ivy states that her sugars are mostly in the  range  If she eats poorly, it does rise higher  CM reinforced the importance of her actively managing her diabetes  She states that she knows what to do but does not always do it  Ivy is still on oxygen  She has not scheduled with pulmonary  She has the contact information and will call  CM reviewed that cardiology wanted her to decrease her fluid intake to 1500 cc per day or 50 ounces hoping her weight would decrease back to around 280 lbs  Once she meets that goal she can increase her fluids to 2000cc/day  CM reviewed how and why we check daily weights  Ivy has a scale and will start checking her weight each morning  She will call cardiology if her weight increases by 3 lb in 1 day or 5 lb in 1 week  We discussed transportation needs  Ivy is not 70years old yet, so she is not eligible for [employer] transport  She states that she drives but likes someone to push her in the wheelchair in to the office  CM suggested [employer]  Ivy did not think she could afford uber type rides and wants to rely on her friends  Ivy has my contact information and will call with any questions

## 2023-05-02 ENCOUNTER — PATIENT OUTREACH (OUTPATIENT)
Dept: FAMILY MEDICINE CLINIC | Facility: HOSPITAL | Age: 63
End: 2023-05-02

## 2023-05-02 NOTE — PROGRESS NOTES
Outpatient Care Management Note:    Voice mail message left for Ivy, with my contact information, requesting a call back

## 2023-05-09 DIAGNOSIS — Z79.899 ENCOUNTER FOR LONG-TERM (CURRENT) DRUG USE: ICD-10-CM

## 2023-05-10 RX ORDER — LORAZEPAM 0.5 MG/1
TABLET ORAL
Qty: 90 TABLET | Refills: 0 | Status: SHIPPED | OUTPATIENT
Start: 2023-05-10

## 2023-05-12 LAB
ALBUMIN SERPL-MCNC: 4.3 G/DL (ref 3.8–4.8)
ALBUMIN/GLOB SERPL: 2 {RATIO} (ref 1.2–2.2)
ALP SERPL-CCNC: 159 IU/L (ref 44–121)
ALT SERPL-CCNC: 21 IU/L (ref 0–32)
AST SERPL-CCNC: 20 IU/L (ref 0–40)
BILIRUB SERPL-MCNC: 0.3 MG/DL (ref 0–1.2)
BUN SERPL-MCNC: 15 MG/DL (ref 8–27)
BUN/CREAT SERPL: 22 (ref 12–28)
CALCIUM SERPL-MCNC: 9.3 MG/DL (ref 8.7–10.3)
CHLORIDE SERPL-SCNC: 91 MMOL/L (ref 96–106)
CO2 SERPL-SCNC: 31 MMOL/L (ref 20–29)
CREAT SERPL-MCNC: 0.67 MG/DL (ref 0.57–1)
EGFR: 98 ML/MIN/1.73
EST. AVERAGE GLUCOSE BLD GHB EST-MCNC: 237 MG/DL
GLOBULIN SER-MCNC: 2.1 G/DL (ref 1.5–4.5)
GLUCOSE SERPL-MCNC: 183 MG/DL (ref 70–99)
HBA1C MFR BLD: 9.9 % (ref 4.8–5.6)
POTASSIUM SERPL-SCNC: 4.5 MMOL/L (ref 3.5–5.2)
PROT SERPL-MCNC: 6.4 G/DL (ref 6–8.5)
SODIUM SERPL-SCNC: 140 MMOL/L (ref 134–144)

## 2023-05-16 ENCOUNTER — PATIENT OUTREACH (OUTPATIENT)
Dept: FAMILY MEDICINE CLINIC | Facility: HOSPITAL | Age: 63
End: 2023-05-16

## 2023-05-16 DIAGNOSIS — J44.9 ASTHMA-COPD OVERLAP SYNDROME (HCC): ICD-10-CM

## 2023-05-16 RX ORDER — BUDESONIDE 0.5 MG/2ML
INHALANT ORAL
Qty: 60 ML | Refills: 3 | Status: SHIPPED | OUTPATIENT
Start: 2023-05-16

## 2023-05-16 NOTE — PROGRESS NOTES
Outpatient Care Management Note:    Care manager called Ivy  She is scheduled with endocrine tomorrow and states she will be attending  She feels her blood sugars are unchanged  She did note that she has not received her new dexcom sensors, so she has not checked her sugar  She will call them now to follow up  CM encouraged her to use her glucometer when her dexcom is not working  Ivy did get her blood work completed  Ivy is also not checking daily weights  CM again reinforced the importance of checking weights, why we check weights and when to call cardiology  CM will follow up after her endocrine appt    If no changes are made, CM will close the referral

## 2023-05-17 ENCOUNTER — DOCUMENTATION (OUTPATIENT)
Dept: ENDOCRINOLOGY | Facility: HOSPITAL | Age: 63
End: 2023-05-17

## 2023-05-17 ENCOUNTER — OFFICE VISIT (OUTPATIENT)
Dept: ENDOCRINOLOGY | Facility: HOSPITAL | Age: 63
End: 2023-05-17

## 2023-05-17 VITALS
HEART RATE: 86 BPM | WEIGHT: 286 LBS | OXYGEN SATURATION: 89 % | SYSTOLIC BLOOD PRESSURE: 112 MMHG | BODY MASS INDEX: 50.68 KG/M2 | HEIGHT: 63 IN | DIASTOLIC BLOOD PRESSURE: 66 MMHG

## 2023-05-17 DIAGNOSIS — N18.2 TYPE 2 DIABETES MELLITUS WITH STAGE 2 CHRONIC KIDNEY DISEASE, WITH LONG-TERM CURRENT USE OF INSULIN (HCC): Primary | ICD-10-CM

## 2023-05-17 DIAGNOSIS — E11.22 TYPE 2 DIABETES MELLITUS WITH STAGE 2 CHRONIC KIDNEY DISEASE, WITH LONG-TERM CURRENT USE OF INSULIN (HCC): Primary | ICD-10-CM

## 2023-05-17 DIAGNOSIS — Z79.4 TYPE 2 DIABETES MELLITUS WITH STAGE 2 CHRONIC KIDNEY DISEASE, WITH LONG-TERM CURRENT USE OF INSULIN (HCC): Primary | ICD-10-CM

## 2023-05-17 NOTE — PATIENT INSTRUCTIONS
Monitor diet  Make sure to drink plenty water throughout the day  Continue metformin 1000 mg twice a day, Victoza and glimepiride 8 mg with breakfast      Continue Lantus to 30 units daily  Continue Apidra  24 units with breakfast, and 12 units at lunch and dinner  Stop Victoza and start Ozempic 0 5 mg weekly  Contact the office with any concerns or questions  Follow-up in 3 months with lab work completed prior to visit

## 2023-05-17 NOTE — PROGRESS NOTES
Sanjuana Stafford 57 y o  female MRN: [id number]    Encounter: [id number]      Assessment/Plan     Assessment: This is a 57y o -year-old female with type 2 diabetes with neuropathy, chronic kidney disease, and hyperlipidemia  Plan:  1  Type 2 diabetes, insulin requiring: Recent hemoglobin A1c was 9 9  Review of her Dexcom she does have the continued this with increase blood sugars after breakfast   Violette changes to her medications, and she is not on the max dose of Victoza  That being said we did discuss switching to Ozempic to see if this will provide better control  Stop Victoza and start Ozempic 0 5 mg weekly  Contact the office if there is any concerns  She will continue with Lantus to 30 units daily and Humalog 24 units with breakfast and 12 units with lunch and dinner  Would like to see a download in about 2 weeks  We will continue with the same dose metformin  Contact the office with any concerns or questions  Follow-up in 3 months with lab work completed prior to visit      Patient is type 2 diabetic currently utilizing 4 or more insulin injections a day  Breanna Sandhu is checking her blood sugar for more times a day, and adjusting insulin doses according to current glucose levels   Because of this, it would be beneficial for her to continue utilizing a Dexcom continuous glucose monitor      2  Diabetic neuropathy:  Stable  Diabetic foot exam is up-to-date        3  History of stage 2 chronic kidney disease:  Kidney functions stable   Chlorides are low but stable  Possibly due to COPD      4  Hyperlipidemia:  Most recent lipid panel looked excellent   Continue with current medication    Repeat lipid panel prior to next office visit    CC: Type 2 diabetes follow-up    History of Present Illness     HPI:  Mattie Joy is a 57year old female with type 2 diabetes, insulin requiring for 8 year, hyperlipidemia for follow-up    She was placed on insulin in June 2021 when her hemoglobin A1c went up markedly  Breanna Sandhu "admits to a poor diet   She can do very well at times but has \"no discipline\"   She reportedly did try Januvia and Jardiance in the past but had problems with side effects on these medications  She is on oral agents and insulin at home and takes metformin  mg 2 tablets twice a day, and Lantus insulin 28 units at bedtime, Humalog 20 units with breakfast and 12 units with lunch and dinner plus sliding scale, Victoza 1 2 mg daily  At times is not feel like she is getting the injection at all  She admits to polyuria, polydipsia, polyphagia, nocturia 3-4 times a night, and blurry vision   She has unusual sensations of her feet with painful sensations of her feet   She denies chest pain but has shortness of breath with her lung disease   She is on oxygen 24 hours a day  She denies retinopathy, heart attack, stroke and claudication but does admit to neuropathy and nephropathy   Continues to have some depression  No concerns or questions today      Has followed up with [facility] November 2021      Hypoglycemic episodes: No never  H/o of hypoglycemia causing hospitalization or intervention such as glucagon injection  or ambulance call  No   Hypoglycemia symptoms: never had one  Treatment of hypoglycemia: would eat something sweet or sugar  Glucagon:No   Medic alert tag: recommended,Yes       The patient's last eye exam was at June 2022  Geraldine Zelaya patient's last foot exam was in with podiatry, Dr Lobito Torres last saw Megan Radhika several weeks ago and does every 3 months   Last diabetic foot exam completed in the office was November 2022      Blood Sugar/Glucometer/Pump/CGM review: Download of Dexcom from April 11 through April 24, 2023 reveals an average glucose level of 291 with a standard deviation of 73  Unfortunately she is waiting for supplies of sensors, so does not have any more recent glucose logs  She is in target range 5% time, and above target range 95% time    Close levels trend down overnight, " and is typically high and in the morning  With breakfast glucose levels increase significantly, and then trends down throughout the day  She has hyperlipidemia and takes atorvastatin 40 mg daily   She denies chest pain but has rare palpitations  Review of Systems   Constitutional: Positive for fatigue  Negative for activity change, appetite change and unexpected weight change  HENT: Negative for sore throat and trouble swallowing  Eyes: Negative for visual disturbance  Respiratory: Positive for shortness of breath (On portable oxygen)  Negative for chest tightness  Cardiovascular: Negative for chest pain, palpitations and leg swelling  Gastrointestinal: Negative for abdominal pain, constipation, diarrhea, nausea and vomiting  Endocrine: Positive for polydipsia, polyphagia and polyuria  Negative for cold intolerance and heat intolerance  Genitourinary: Negative for frequency  Nocturia   Musculoskeletal: Positive for gait problem (Utilizes wheelchair)  Skin: Negative for wound  Neurological: Positive for numbness ( bilateral feet)  Negative for dizziness, weakness and headaches  Psychiatric/Behavioral: Positive for dysphoric mood and sleep disturbance  The patient is not nervous/anxious  Historical Information   Past Medical History:   Diagnosis Date   • Acute blood loss anemia 6/1/2021   • Acute diverticulitis 5/2/2021   • Alcohol abuse 5/21/2020   • Anemia    • Atrial fibrillation [facility])    • Cervical radiculopathy    • CHF (congestive heart failure) ([facility])    • Chronic low back pain    • Chronic obstructive asthma ([facility] [address] ) 2/20/2018   • Cigarette nicotine dependence without complication 70/89/4434    Quit 12/10/2019      • Community acquired pneumonia 5/21/2020   • Depression with anxiety 3/9/2014   • Diabetes mellitus with hyperglycemia ([facility] [address] )    • Diverticulitis 6/6/2021   • Elevated liver enzymes    • GERD (gastroesophageal reflux disease)    • Hypersomnolence 10/28/2020   • Hypokalemia 2018   • Paresthesia of upper extremity    • Plantar fasciitis    • Restless legs syndrome 2014   • Sexual dysfunction    • Sleep apnea, obstructive    • Tenosynovitis of finger    • Tinea corporis    • Tobacco use 2018    Currently smoking 3-4 cigarettes daily   • Trigger middle finger of left hand 2017   • Trigger ring finger of left hand 2017   • Weakness of upper extremity      Past Surgical History:   Procedure Laterality Date   • ABDOMINAL SURGERY     • CARPAL TUNNEL RELEASE Bilateral    •  SECTION     • CHOLECYSTECTOMY      laparoscopic   • ESOPHAGOGASTRODUODENOSCOPY N/A 12/3/2018    Procedure: ESOPHAGOGASTRODUODENOSCOPY (EGD) AND COLONOSCOPY;  Surgeon: Tyshawn Quevedo MD;  Location: [facility];  Service: Gastroenterology   • GASTRIC BYPASS      for morbid obesity with gilda en y   • HERNIA REPAIR     • HYSTERECTOMY     • DC EXCISION GANGLION WRIST DORSAL/VOLAR PRIMARY Left 2017    Procedure: LONG FINGER GANGLION CYST EXCISION;  Surgeon: Kermit Oates MD;  [facility];  Service: Orthopedics   • DC TENDON SHEATH INCISION Left 2017    Procedure: Romelia Navarrete, RING, TRIGGER FINGER RELEASE;  Surgeon: Kermit Oates MD;  Location: [facility];  Service: Orthopedics   • SHOULDER SURGERY Right      Social History   Social History     Substance and Sexual Activity   Alcohol Use Not Currently   • Alcohol/week: 20 0 standard drinks   • Types: 20 Cans of beer per week    Comment: about 6 coors light every night, stopped in      Social History     Substance and Sexual Activity   Drug Use No     Social History     Tobacco Use   Smoking Status Former   • Packs/day: 0 25   • Years: 29 00   • Pack years: 7 25   • Types: Cigarettes   • Start date: 200   • Quit date: 12/10/2019   • Years since quitting: 3 4   Smokeless Tobacco Never     Family History:   Family History   Problem Relation Age of Onset   • Alzheimer's disease Mother    • Atrial fibrillation Mother    • Dementia Mother    • Heart disease Mother         heart problem   • Seizures Mother    • Parkinsonism Father    • Arthritis Father    • Atrial fibrillation Father    • No Known Problems Daughter    • Cri-du-chat syndrome Daughter    • Colon cancer Maternal Grandmother [de-identified]   • Diabetes type II Maternal Grandmother    • No Known Problems Brother    • No Known Problems Son    • Substance Abuse Neg Hx         mother,father   • Mental illness Neg Hx         mother,father   • Colon polyps Neg Hx        Meds/Allergies   Current Outpatient Medications   Medication Sig Dispense Refill   • albuterol (2 5 mg/3 mL) 0 083 % nebulizer solution Take 3 mL (2 5 mg total) by nebulization every 6 (six) hours as needed for wheezing or shortness of breath 1080 mL 3   • albuterol (ProAir HFA) 90 mcg/act inhaler Inhale 2 puffs every 6 (six) hours as needed for wheezing or shortness of breath 8 5 g 3   • albuterol (PROVENTIL HFA,VENTOLIN HFA) 90 mcg/act inhaler Inhale 2 puffs every 4 (four) hours as needed for wheezing 1 Inhaler 4   • atorvastatin (LIPITOR) 40 mg tablet Take 1 tablet (40 mg total) by mouth daily 90 tablet 3   • Benralizumab 30 MG/ML SOAJ Inject 1 mL under the skin every 28 days 1 mL 2   • Benralizumab 30 MG/ML SOAJ Inject 30 mg under the skin every 56 days 1 mL 6   • Blood Glucose Monitoring Suppl (OSSIANIX CONTOUR NEXT MONITOR) w/Device KIT Use daily     • budesonide (PULMICORT) 0 5 mg/2 mL nebulizer solution TAKE 2 ML (0 5 MG TOTAL) BY NEBULIZATION TWICE A DAY RINSE MOUTH AFTER USE 60 mL 3   • Contour Next Test test strip USE TO TEST BLOOD SUGAR 3 TIMES A  strip 3   • [employer] Lancets Thin 26G MISC USE 3 (THREE) TIMES A  each 5   • Eliquis 5 MG TAKE 1 TABLET BY MOUTH TWICE A DAY 60 tablet 5   • escitalopram (LEXAPRO) 20 mg tablet TAKE 1 TABLET BY MOUTH EVERY DAY 90 tablet 0   • fluticasone (FLONASE) 50 mcg/act nasal spray 1 spray into each nostril 2 (two) times a day 1 Bottle 3   • glycopyrrolate-formoterol (BEVESPI AEROSPHERE) 9-4 8 MCG/ACT inhaler Inhale 2 puffs 2 (two) times a day 10 7 g 5   • Insulin Glargine Solostar (Lantus SoloStar) 100 UNIT/ML SOPN Inject 0 3 mL (30 Units total) under the skin daily at bedtime 30 mL 1   • insulin glulisine (Apidra SoloStar) 100 units/mL injection pen 24 UNITS BREAKFAST 12 UNITS AT LUNCH AND DINNER 15 mL 5   • Insulin Pen Needle (BD Pen Needle Love 2nd Gen) 32G X 4 MM MISC Use daily at bedtime Use 4 new needles daily   400 each 3   • levalbuterol (XOPENEX) 1 25 mg/0 5 mL nebulizer solution Take 0 5 mL (1 25 mg total) by nebulization 2 (two) times a day 60 vial 0   • loratadine (CLARITIN) 10 mg tablet Take by mouth     • LORazepam (ATIVAN) 0 5 mg tablet TAKE 2 TABLETS BY MOUTH AT BEDTIME AND 1 EVERY 6 HOURS AS NEEDED FOR ANXIETY 90 tablet 0   • metFORMIN (GLUCOPHAGE-XR) 500 mg 24 hr tablet TAKE 2 TABLETS BY MOUTH TWICE A DAY WITH FOOD 360 tablet 1   • metolazone (ZAROXOLYN) 5 mg tablet Take 1 tablet (5 mg total) by mouth daily 5 tablet 0   • metoprolol succinate (TOPROL-XL) 100 mg 24 hr tablet Take 1 tablet (100 mg total) by mouth daily 90 tablet 3   • montelukast (SINGULAIR) 10 mg tablet TAKE 1 TABLET BY MOUTH DAILY AT BEDTIME 90 tablet 3   • naloxone (NARCAN) 4 mg/0 1 mL nasal spray Administer 1 spray into a nostril  If breathing does not return to normal or if breathing difficulty resumes after 2-3 minutes, give another dose in the other nostril using a new spray   1 each 1   • omeprazole (PriLOSEC) 40 MG capsule Take 1 capsule (40 mg total) by mouth daily 90 capsule 3   • potassium chloride (Klor-Con M20) 20 mEq tablet Take 1 tablet (20 mEq total) by mouth 2 (two) times a day 180 tablet 3   • semaglutide, 0 25 or 0 5 mg/dose, (Ozempic, 0 25 or 0 5 MG/DOSE,) 2 mg/3 mL injection pen Inject 0 75 mL (0 5 mg total) under the skin every 7 days 3 mL 5   • torsemide (DEMADEX) 20 mg tablet Take 3 tablets (60 mg total) by mouth 2 (two) times a day 540 tablet 1 "  • traZODone (DESYREL) 150 mg tablet TAKE 1 TABLET BY MOUTH EVERYDAY AT BEDTIME 90 tablet 0   • ascorbic acid (VITAMIN C) 1000 MG tablet Take 1,000 mg by mouth daily (Patient not taking: Reported on 3/29/2023)     • Cholecalciferol 1000 units tablet Take 1,000 Units by mouth daily (Patient not taking: Reported on 2/8/2023)     • cyanocobalamin (VITAMIN B-12) 100 mcg tablet Take by mouth daily (Patient not taking: Reported on 2/8/2023)     • EPINEPHrine (EPIPEN) 0 3 mg/0 3 mL SOAJ Inject 0 3 mL (0 3 mg total) into a muscle once for 1 dose 0 6 mL 0   • EPINEPHrine (EPIPEN) 0 3 mg/0 3 mL SOAJ Inject 0 3 mL (0 3 mg total) into a muscle once for 1 dose 0 6 mL 1   • ferrous sulfate 220 (44 Fe) mg/5 mL solution TAKE 5 ML (220 MG TOTAL) BY MOUTH 2 (TWO) TIMES A DAY BEFORE MEALS (Patient not taking: Reported on 9/29/2022) 473 mL 2     No current facility-administered medications for this visit  Allergies   Allergen Reactions   • Iron Dextran Swelling   • Januvia [Sitagliptin] Shortness Of Breath   • Jardiance [Empagliflozin] Shortness Of Breath   • Clonazepam Other (See Comments)     Unknown reaction   • Codeine Itching and Other (See Comments)     itch;mary kay morphine,takes ultram @home   • Adhesive [Medical Tape] Itching     itching   • Effexor [Venlafaxine] Itching   • Lactose - Food Allergy GI Intolerance   • Oxycodone-Acetaminophen Anxiety   • Oxycodone-Acetaminophen Itching and Rash     Other reaction(s): Other (See Comments)  (PERCOCET) MILD RASH, not allergic to Acetaminophen        Objective   Vitals: Blood pressure 112/66, pulse 86, height 5' 3\" (1 6 m), weight 130 kg (286 lb), SpO2 (!) 89 %, not currently breastfeeding  Physical Exam  Vitals and nursing note reviewed  Constitutional:       General: She is not in acute distress  Appearance: Normal appearance  She is not diaphoretic  HENT:      Head: Normocephalic and atraumatic  Eyes:      General: No scleral icterus       Extraocular Movements: " Extraocular movements intact  Conjunctiva/sclera: Conjunctivae normal       Pupils: Pupils are equal, round, and reactive to light  Cardiovascular:      Rate and Rhythm: Normal rate and regular rhythm  Heart sounds: No murmur heard  Pulmonary:      Effort: Pulmonary effort is normal  No respiratory distress  Breath sounds: Decreased breath sounds present  No wheezing  Comments: On portable oxygen  Musculoskeletal:      Cervical back: Normal range of motion  Right lower leg: Edema present  Left lower leg: Edema present  Lymphadenopathy:      Cervical: No cervical adenopathy  Neurological:      Mental Status: She is alert and oriented to person, place, and time  Mental status is at baseline  Sensory: No sensory deficit  Gait: Gait abnormal (In wheelchair)  Psychiatric:         Mood and Affect: Mood normal          Behavior: Behavior normal          Thought Content: Thought content normal          The history was obtained from the review of the chart, patient      Lab Results:   Lab Results   Component Value Date/Time    Hemoglobin A1C 9 9 (H) 05/11/2023 10:05 AM    Hemoglobin A1C 9 9 (H) 02/06/2023 11:38 AM    Hemoglobin A1C 9 5 (H) 10/25/2022 10:25 AM    White Blood Cell Count 13 9 (H) 12/01/2022 01:40 PM    Hemoglobin 11 0 (L) 12/01/2022 01:40 PM    Hgb, i-STAT 12 6 09/27/2022 01:13 PM    HCT 36 8 12/01/2022 01:40 PM    Hct, i-STAT 37 09/27/2022 01:13 PM    MCV 74 (L) 12/01/2022 01:40 PM    Platelet Count 078 21/45/9714 01:40 PM    BUN 15 05/11/2023 10:05 AM    BUN 11 02/06/2023 11:38 AM    BUN 10 10/25/2022 10:25 AM    Potassium 4 5 05/11/2023 10:05 AM    Potassium 4 2 02/06/2023 11:38 AM    Potassium 4 6 10/25/2022 10:25 AM    Chloride 91 (L) 05/11/2023 10:05 AM    Chloride 92 (L) 02/06/2023 11:38 AM    Chloride 93 (L) 10/25/2022 10:25 AM    CO2 31 (H) 05/11/2023 10:05 AM    CO2 30 (H) 02/06/2023 11:38 AM    CO2 32 (H) 10/25/2022 10:25 AM    Creatinine 0 67 "05/11/2023 10:05 AM    Creatinine 0 65 02/06/2023 11:38 AM    Creatinine 0 63 10/25/2022 10:25 AM    AST 20 05/11/2023 10:05 AM    AST 27 02/06/2023 11:38 AM    AST 31 10/25/2022 10:25 AM    ALT 21 05/11/2023 10:05 AM    ALT 42 (H) 02/06/2023 11:38 AM    ALT 45 (H) 10/25/2022 10:25 AM    Albumin 4 3 05/11/2023 10:05 AM    Albumin 4 2 02/06/2023 11:38 AM    Albumin 4 3 10/25/2022 10:25 AM    Globulin, Total 2 1 05/11/2023 10:05 AM    Globulin, Total 2 3 02/06/2023 11:38 AM    Globulin, Total 2 1 10/25/2022 10:25 AM    HDL 48 10/25/2022 10:25 AM    Triglycerides 126 10/25/2022 10:25 AM       Portions of the record may have been created with voice recognition software  Occasional wrong word or \"sound a like\" substitutions may have occurred due to the inherent limitations of voice recognition software  Read the chart carefully and recognize, using context, where substitutions have occurred    "

## 2023-05-19 NOTE — RESULT NOTES
Verified Results  (1) URINE CULTURE [id number] 12:00AM Laith Olivares     Test Name Result Flag Reference   Result 1 Comment A    Escherichia coli, identified by an automated biochemical system  1,000 Colonies/mL   Urine Culture,Comprehensive Final report A    Antimicrobial Susceptibility Comment     ** S = Susceptible; I = Intermediate; R = Resistant **                     P = Positive; N = Negative              MICS are expressed in micrograms per mL     Antibiotic                 RSLT#1    RSLT#2    RSLT#3    RSLT#4  Amoxicillin/Clavulanic Acid    S  Ampicillin                     S  Cefepime                       S  Ceftriaxone                    S  Cefuroxime                     S  Cephalothin                    I  Ciprofloxacin                  S  Ertapenem                      S  Gentamicin                     S  Imipenem                       S  Levofloxacin                   S  Nitrofurantoin                 S  Piperacillin                   S  Tetracycline                   S  Tobramycin                     S  Trimethoprim/Sulfa             S       Discussion/Summary    please call urine does show infection  should respond to levaquin that was given     please ask how she is doing  Patient aware and doing alittle bit better1       1 Amended By: Darliss Fleischer; Jun 16 2016 7:15 AM EST Detail Level: Simple Render Risk Assessment In Note?: no Additional Notes: **Will monitor site for recurrence and then would rec. plastic surgeon for excision in this location. Additional Notes: **Can do kenalog injection when needed.

## 2023-05-30 ENCOUNTER — PATIENT OUTREACH (OUTPATIENT)
Dept: FAMILY MEDICINE CLINIC | Facility: HOSPITAL | Age: 63
End: 2023-05-30

## 2023-05-31 DIAGNOSIS — G47.00 INSOMNIA, UNSPECIFIED TYPE: ICD-10-CM

## 2023-05-31 DIAGNOSIS — Z79.899 ENCOUNTER FOR LONG-TERM (CURRENT) DRUG USE: ICD-10-CM

## 2023-05-31 RX ORDER — LORAZEPAM 0.5 MG/1
TABLET ORAL
Qty: 90 TABLET | Refills: 0 | Status: SHIPPED | OUTPATIENT
Start: 2023-05-31

## 2023-05-31 RX ORDER — TRAZODONE HYDROCHLORIDE 150 MG/1
150 TABLET ORAL
Qty: 90 TABLET | Refills: 0 | Status: SHIPPED | OUTPATIENT
Start: 2023-05-31

## 2023-06-02 ENCOUNTER — APPOINTMENT (EMERGENCY)
Dept: RADIOLOGY | Facility: HOSPITAL | Age: 63
DRG: 140 | End: 2023-06-02
Payer: COMMERCIAL

## 2023-06-02 ENCOUNTER — HOSPITAL ENCOUNTER (INPATIENT)
Facility: HOSPITAL | Age: 63
LOS: 3 days | Discharge: HOME | DRG: 140 | End: 2023-06-06
Attending: EMERGENCY MEDICINE | Admitting: STUDENT IN AN ORGANIZED HEALTH CARE EDUCATION/TRAINING PROGRAM
Payer: COMMERCIAL

## 2023-06-02 DIAGNOSIS — R06.03 RESPIRATORY DISTRESS: Primary | ICD-10-CM

## 2023-06-02 DIAGNOSIS — Z79.4 TYPE 2 DIABETES MELLITUS WITH HYPERGLYCEMIA, WITH LONG-TERM CURRENT USE OF INSULIN (HCC): ICD-10-CM

## 2023-06-02 DIAGNOSIS — J18.9 PNEUMONIA: ICD-10-CM

## 2023-06-02 DIAGNOSIS — I50.9 CHF (CONGESTIVE HEART FAILURE) (HCC): ICD-10-CM

## 2023-06-02 DIAGNOSIS — J45.901 ASTHMA WITH COPD WITH EXACERBATION (HCC): ICD-10-CM

## 2023-06-02 DIAGNOSIS — J44.1 ASTHMA WITH COPD WITH EXACERBATION (HCC): ICD-10-CM

## 2023-06-02 DIAGNOSIS — R10.9 ABDOMINAL PAIN: ICD-10-CM

## 2023-06-02 DIAGNOSIS — E11.65 TYPE 2 DIABETES MELLITUS WITH HYPERGLYCEMIA, WITH LONG-TERM CURRENT USE OF INSULIN (HCC): ICD-10-CM

## 2023-06-02 LAB
ALBUMIN SERPL BCP-MCNC: 4.3 G/DL (ref 3.5–5)
ALP SERPL-CCNC: 157 U/L (ref 34–104)
ALT SERPL W P-5'-P-CCNC: 37 U/L (ref 7–52)
ANION GAP SERPL CALCULATED.3IONS-SCNC: 10 MMOL/L (ref 4–13)
APTT PPP: 32 SECONDS (ref 23–37)
AST SERPL W P-5'-P-CCNC: 24 U/L (ref 13–39)
BASOPHILS # BLD AUTO: 0.05 THOUSANDS/ÂΜL (ref 0–0.1)
BASOPHILS NFR BLD AUTO: 0 % (ref 0–1)
BILIRUB SERPL-MCNC: 0.39 MG/DL (ref 0.2–1)
BNP SERPL-MCNC: 105 PG/ML (ref 0–100)
BUN SERPL-MCNC: 16 MG/DL (ref 5–25)
CALCIUM SERPL-MCNC: 9.3 MG/DL (ref 8.4–10.2)
CARDIAC TROPONIN I PNL SERPL HS: 5 NG/L
CHLORIDE SERPL-SCNC: 92 MMOL/L (ref 96–108)
CO2 SERPL-SCNC: 35 MMOL/L (ref 21–32)
CREAT SERPL-MCNC: 0.76 MG/DL (ref 0.6–1.3)
EOSINOPHIL # BLD AUTO: 0 THOUSAND/ÂΜL (ref 0–0.61)
EOSINOPHIL NFR BLD AUTO: 0 % (ref 0–6)
ERYTHROCYTE [DISTWIDTH] IN BLOOD BY AUTOMATED COUNT: 18.5 % (ref 11.6–15.1)
GFR SERPL CREATININE-BSD FRML MDRD: 83 ML/MIN/1.73SQ M
GLUCOSE SERPL-MCNC: 222 MG/DL (ref 65–140)
HCT VFR BLD AUTO: 35.3 % (ref 34.8–46.1)
HGB BLD-MCNC: 10.1 G/DL (ref 11.5–15.4)
IMM GRANULOCYTES # BLD AUTO: 0.09 THOUSAND/UL (ref 0–0.2)
IMM GRANULOCYTES NFR BLD AUTO: 1 % (ref 0–2)
INR PPP: 1.09 (ref 0.84–1.19)
LYMPHOCYTES # BLD AUTO: 2.17 THOUSANDS/ÂΜL (ref 0.6–4.47)
LYMPHOCYTES NFR BLD AUTO: 15 % (ref 14–44)
MCH RBC QN AUTO: 20 PG (ref 26.8–34.3)
MCHC RBC AUTO-ENTMCNC: 28.6 G/DL (ref 31.4–37.4)
MCV RBC AUTO: 70 FL (ref 82–98)
MONOCYTES # BLD AUTO: 0.79 THOUSAND/ÂΜL (ref 0.17–1.22)
MONOCYTES NFR BLD AUTO: 6 % (ref 4–12)
NEUTROPHILS # BLD AUTO: 11.28 THOUSANDS/ÂΜL (ref 1.85–7.62)
NEUTS SEG NFR BLD AUTO: 78 % (ref 43–75)
NRBC BLD AUTO-RTO: 0 /100 WBCS
PLATELET # BLD AUTO: 343 THOUSANDS/UL (ref 149–390)
PMV BLD AUTO: 11.1 FL (ref 8.9–12.7)
POTASSIUM SERPL-SCNC: 3.8 MMOL/L (ref 3.5–5.3)
PROT SERPL-MCNC: 7.6 G/DL (ref 6.4–8.4)
PROTHROMBIN TIME: 14.9 SECONDS (ref 11.6–14.5)
RBC # BLD AUTO: 5.06 MILLION/UL (ref 3.81–5.12)
SODIUM SERPL-SCNC: 137 MMOL/L (ref 135–147)
WBC # BLD AUTO: 14.38 THOUSAND/UL (ref 4.31–10.16)

## 2023-06-02 PROCEDURE — 0241U HB NFCT DS VIR RESP RNA 4 TRGT: CPT | Performed by: EMERGENCY MEDICINE

## 2023-06-02 PROCEDURE — 71045 X-RAY EXAM CHEST 1 VIEW: CPT

## 2023-06-02 PROCEDURE — 94664 DEMO&/EVAL PT USE INHALER: CPT

## 2023-06-02 PROCEDURE — 99285 EMERGENCY DEPT VISIT HI MDM: CPT

## 2023-06-02 PROCEDURE — 93005 ELECTROCARDIOGRAM TRACING: CPT

## 2023-06-02 PROCEDURE — 84145 PROCALCITONIN (PCT): CPT | Performed by: EMERGENCY MEDICINE

## 2023-06-02 PROCEDURE — 84484 ASSAY OF TROPONIN QUANT: CPT | Performed by: EMERGENCY MEDICINE

## 2023-06-02 PROCEDURE — 87040 BLOOD CULTURE FOR BACTERIA: CPT | Performed by: EMERGENCY MEDICINE

## 2023-06-02 PROCEDURE — 83605 ASSAY OF LACTIC ACID: CPT | Performed by: EMERGENCY MEDICINE

## 2023-06-02 PROCEDURE — 96375 TX/PRO/DX INJ NEW DRUG ADDON: CPT

## 2023-06-02 PROCEDURE — 85730 THROMBOPLASTIN TIME PARTIAL: CPT | Performed by: EMERGENCY MEDICINE

## 2023-06-02 PROCEDURE — 94760 N-INVAS EAR/PLS OXIMETRY 1: CPT

## 2023-06-02 PROCEDURE — 83880 ASSAY OF NATRIURETIC PEPTIDE: CPT | Performed by: EMERGENCY MEDICINE

## 2023-06-02 PROCEDURE — 80053 COMPREHEN METABOLIC PANEL: CPT | Performed by: EMERGENCY MEDICINE

## 2023-06-02 PROCEDURE — 85610 PROTHROMBIN TIME: CPT | Performed by: EMERGENCY MEDICINE

## 2023-06-02 PROCEDURE — 85025 COMPLETE CBC W/AUTO DIFF WBC: CPT | Performed by: EMERGENCY MEDICINE

## 2023-06-02 PROCEDURE — 85379 FIBRIN DEGRADATION QUANT: CPT | Performed by: EMERGENCY MEDICINE

## 2023-06-02 PROCEDURE — 94640 AIRWAY INHALATION TREATMENT: CPT

## 2023-06-02 PROCEDURE — 36415 COLL VENOUS BLD VENIPUNCTURE: CPT | Performed by: EMERGENCY MEDICINE

## 2023-06-02 RX ORDER — IPRATROPIUM BROMIDE AND ALBUTEROL SULFATE 2.5; .5 MG/3ML; MG/3ML
3 SOLUTION RESPIRATORY (INHALATION) ONCE
Status: DISCONTINUED | OUTPATIENT
Start: 2023-06-02 | End: 2023-06-02

## 2023-06-02 RX ORDER — METHYLPREDNISOLONE SODIUM SUCCINATE 125 MG/2ML
100 INJECTION, POWDER, LYOPHILIZED, FOR SOLUTION INTRAMUSCULAR; INTRAVENOUS ONCE
Status: COMPLETED | OUTPATIENT
Start: 2023-06-02 | End: 2023-06-02

## 2023-06-02 RX ORDER — SODIUM CHLORIDE 9 MG/ML
3 INJECTION INTRAVENOUS EVERY 8 HOURS SCHEDULED
Status: DISCONTINUED | OUTPATIENT
Start: 2023-06-02 | End: 2023-06-06 | Stop reason: HOSPADM

## 2023-06-02 RX ORDER — SODIUM CHLORIDE FOR INHALATION 0.9 %
3 VIAL, NEBULIZER (ML) INHALATION ONCE
Status: COMPLETED | OUTPATIENT
Start: 2023-06-02 | End: 2023-06-02

## 2023-06-02 RX ADMIN — METHYLPREDNISOLONE SODIUM SUCCINATE 100 MG: 125 INJECTION, POWDER, FOR SOLUTION INTRAMUSCULAR; INTRAVENOUS at 23:28

## 2023-06-02 RX ADMIN — ALBUTEROL SULFATE 10 MG: 2.5 SOLUTION RESPIRATORY (INHALATION) at 23:36

## 2023-06-02 RX ADMIN — ISODIUM CHLORIDE 3 ML: 0.03 SOLUTION RESPIRATORY (INHALATION) at 23:36

## 2023-06-02 RX ADMIN — IPRATROPIUM BROMIDE 0.5 MG: 0.5 SOLUTION RESPIRATORY (INHALATION) at 23:36

## 2023-06-03 ENCOUNTER — APPOINTMENT (EMERGENCY)
Dept: CT IMAGING | Facility: HOSPITAL | Age: 63
DRG: 140 | End: 2023-06-03
Payer: COMMERCIAL

## 2023-06-03 PROBLEM — K76.0 HEPATIC STEATOSIS: Status: ACTIVE | Noted: 2023-06-03

## 2023-06-03 LAB
2HR DELTA HS TROPONIN: 1 NG/L
4HR DELTA HS TROPONIN: -1 NG/L
ANION GAP SERPL CALCULATED.3IONS-SCNC: 12 MMOL/L (ref 4–13)
ANION GAP SERPL CALCULATED.3IONS-SCNC: 13 MMOL/L (ref 4–13)
ATRIAL RATE: 88 BPM
BASE EX.OXY STD BLDV CALC-SCNC: 82.5 % (ref 60–80)
BASE EXCESS BLDV CALC-SCNC: 5 MMOL/L
BASOPHILS # BLD AUTO: 0.06 THOUSANDS/ÂΜL (ref 0–0.1)
BASOPHILS NFR BLD AUTO: 0 % (ref 0–1)
BETA-HYDROXYBUTYRATE: 0.2 MMOL/L
BILIRUB UR QL STRIP: NEGATIVE
BUN SERPL-MCNC: 17 MG/DL (ref 5–25)
BUN SERPL-MCNC: 18 MG/DL (ref 5–25)
CALCIUM SERPL-MCNC: 8.7 MG/DL (ref 8.4–10.2)
CALCIUM SERPL-MCNC: 9.3 MG/DL (ref 8.4–10.2)
CARDIAC TROPONIN I PNL SERPL HS: 4 NG/L
CARDIAC TROPONIN I PNL SERPL HS: 6 NG/L
CHLORIDE SERPL-SCNC: 89 MMOL/L (ref 96–108)
CHLORIDE SERPL-SCNC: 92 MMOL/L (ref 96–108)
CLARITY UR: CLEAR
CO2 SERPL-SCNC: 31 MMOL/L (ref 21–32)
CO2 SERPL-SCNC: 32 MMOL/L (ref 21–32)
COLOR UR: NORMAL
CREAT SERPL-MCNC: 0.77 MG/DL (ref 0.6–1.3)
CREAT SERPL-MCNC: 0.85 MG/DL (ref 0.6–1.3)
D DIMER PPP FEU-MCNC: <0.27 UG/ML FEU
EOSINOPHIL # BLD AUTO: 0 THOUSAND/ÂΜL (ref 0–0.61)
EOSINOPHIL NFR BLD AUTO: 0 % (ref 0–6)
ERYTHROCYTE [DISTWIDTH] IN BLOOD BY AUTOMATED COUNT: 18.5 % (ref 11.6–15.1)
ERYTHROCYTE [DISTWIDTH] IN BLOOD BY AUTOMATED COUNT: 18.5 % (ref 11.6–15.1)
FLUAV RNA RESP QL NAA+PROBE: NEGATIVE
FLUBV RNA RESP QL NAA+PROBE: NEGATIVE
GFR SERPL CREATININE-BSD FRML MDRD: 72 ML/MIN/1.73SQ M
GFR SERPL CREATININE-BSD FRML MDRD: 81 ML/MIN/1.73SQ M
GLUCOSE SERPL-MCNC: 255 MG/DL (ref 65–140)
GLUCOSE SERPL-MCNC: 283 MG/DL (ref 65–140)
GLUCOSE SERPL-MCNC: 317 MG/DL (ref 65–140)
GLUCOSE SERPL-MCNC: 387 MG/DL (ref 65–140)
GLUCOSE SERPL-MCNC: 388 MG/DL (ref 65–140)
GLUCOSE SERPL-MCNC: 391 MG/DL (ref 65–140)
GLUCOSE SERPL-MCNC: 498 MG/DL (ref 65–140)
GLUCOSE SERPL-MCNC: 502 MG/DL (ref 65–140)
GLUCOSE SERPL-MCNC: 528 MG/DL (ref 65–140)
GLUCOSE SERPL-MCNC: >500 MG/DL (ref 65–140)
GLUCOSE SERPL-MCNC: >500 MG/DL (ref 65–140)
GLUCOSE UR STRIP-MCNC: NEGATIVE MG/DL
HCO3 BLDV-SCNC: 30.3 MMOL/L (ref 24–30)
HCT VFR BLD AUTO: 33 % (ref 34.8–46.1)
HCT VFR BLD AUTO: 33.7 % (ref 34.8–46.1)
HGB BLD-MCNC: 9.7 G/DL (ref 11.5–15.4)
HGB BLD-MCNC: 9.7 G/DL (ref 11.5–15.4)
HGB UR QL STRIP.AUTO: NEGATIVE
IMM GRANULOCYTES # BLD AUTO: 0.06 THOUSAND/UL (ref 0–0.2)
IMM GRANULOCYTES NFR BLD AUTO: 0 % (ref 0–2)
KETONES UR STRIP-MCNC: NEGATIVE MG/DL
LACTATE SERPL-SCNC: 2.7 MMOL/L (ref 0.5–2)
LACTATE SERPL-SCNC: 2.9 MMOL/L (ref 0.5–2)
LACTATE SERPL-SCNC: 3.7 MMOL/L (ref 0.5–2)
LACTATE SERPL-SCNC: 4 MMOL/L (ref 0.5–2)
LACTATE SERPL-SCNC: 4.3 MMOL/L (ref 0.5–2)
LACTATE SERPL-SCNC: 4.7 MMOL/L (ref 0.5–2)
LEUKOCYTE ESTERASE UR QL STRIP: NEGATIVE
LYMPHOCYTES # BLD AUTO: 1.03 THOUSANDS/ÂΜL (ref 0.6–4.47)
LYMPHOCYTES NFR BLD AUTO: 7 % (ref 14–44)
MCH RBC QN AUTO: 19.7 PG (ref 26.8–34.3)
MCH RBC QN AUTO: 20.3 PG (ref 26.8–34.3)
MCHC RBC AUTO-ENTMCNC: 28.8 G/DL (ref 31.4–37.4)
MCHC RBC AUTO-ENTMCNC: 29.4 G/DL (ref 31.4–37.4)
MCV RBC AUTO: 69 FL (ref 82–98)
MCV RBC AUTO: 69 FL (ref 82–98)
MONOCYTES # BLD AUTO: 0.94 THOUSAND/ÂΜL (ref 0.17–1.22)
MONOCYTES NFR BLD AUTO: 6 % (ref 4–12)
NEUTROPHILS # BLD AUTO: 13.64 THOUSANDS/ÂΜL (ref 1.85–7.62)
NEUTS SEG NFR BLD AUTO: 87 % (ref 43–75)
NITRITE UR QL STRIP: NEGATIVE
NRBC BLD AUTO-RTO: 0 /100 WBCS
O2 CT BLDV-SCNC: 12.2 ML/DL
P AXIS: 90 DEGREES
PCO2 BLDV: 48.2 MM HG (ref 42–50)
PH BLDV: 7.42 [PH] (ref 7.3–7.4)
PH UR STRIP.AUTO: 5.5 [PH]
PLATELET # BLD AUTO: 330 THOUSANDS/UL (ref 149–390)
PLATELET # BLD AUTO: 390 THOUSANDS/UL (ref 149–390)
PMV BLD AUTO: 11 FL (ref 8.9–12.7)
PMV BLD AUTO: 11.1 FL (ref 8.9–12.7)
PO2 BLDV: 51.3 MM HG (ref 35–45)
POTASSIUM SERPL-SCNC: 4.1 MMOL/L (ref 3.5–5.3)
POTASSIUM SERPL-SCNC: 4.5 MMOL/L (ref 3.5–5.3)
PR INTERVAL: 168 MS
PROCALCITONIN SERPL-MCNC: 0.05 NG/ML
PROCALCITONIN SERPL-MCNC: 0.06 NG/ML
PROT UR STRIP-MCNC: NEGATIVE MG/DL
QRS AXIS: 72 DEGREES
QRSD INTERVAL: 70 MS
QT INTERVAL: 364 MS
QTC INTERVAL: 440 MS
RBC # BLD AUTO: 4.79 MILLION/UL (ref 3.81–5.12)
RBC # BLD AUTO: 4.92 MILLION/UL (ref 3.81–5.12)
RSV RNA RESP QL NAA+PROBE: NEGATIVE
SARS-COV-2 RNA RESP QL NAA+PROBE: NEGATIVE
SODIUM SERPL-SCNC: 132 MMOL/L (ref 135–147)
SODIUM SERPL-SCNC: 137 MMOL/L (ref 135–147)
SP GR UR STRIP.AUTO: 1.01 (ref 1–1.03)
T WAVE AXIS: 79 DEGREES
UROBILINOGEN UR STRIP-ACNC: <2 MG/DL
VENTRICULAR RATE: 88 BPM
WBC # BLD AUTO: 15.73 THOUSAND/UL (ref 4.31–10.16)
WBC # BLD AUTO: 16.61 THOUSAND/UL (ref 4.31–10.16)

## 2023-06-03 PROCEDURE — 94762 N-INVAS EAR/PLS OXIMTRY CONT: CPT

## 2023-06-03 PROCEDURE — 82948 REAGENT STRIP/BLOOD GLUCOSE: CPT

## 2023-06-03 PROCEDURE — 94002 VENT MGMT INPAT INIT DAY: CPT

## 2023-06-03 PROCEDURE — 83605 ASSAY OF LACTIC ACID: CPT

## 2023-06-03 PROCEDURE — 99223 1ST HOSP IP/OBS HIGH 75: CPT | Performed by: INTERNAL MEDICINE

## 2023-06-03 PROCEDURE — 82947 ASSAY GLUCOSE BLOOD QUANT: CPT | Performed by: STUDENT IN AN ORGANIZED HEALTH CARE EDUCATION/TRAINING PROGRAM

## 2023-06-03 PROCEDURE — 84484 ASSAY OF TROPONIN QUANT: CPT | Performed by: EMERGENCY MEDICINE

## 2023-06-03 PROCEDURE — 82805 BLOOD GASES W/O2 SATURATION: CPT

## 2023-06-03 PROCEDURE — 36415 COLL VENOUS BLD VENIPUNCTURE: CPT | Performed by: EMERGENCY MEDICINE

## 2023-06-03 PROCEDURE — 94640 AIRWAY INHALATION TREATMENT: CPT

## 2023-06-03 PROCEDURE — 82010 KETONE BODYS QUAN: CPT

## 2023-06-03 PROCEDURE — 99255 IP/OBS CONSLTJ NEW/EST HI 80: CPT | Performed by: INTERNAL MEDICINE

## 2023-06-03 PROCEDURE — 81003 URINALYSIS AUTO W/O SCOPE: CPT | Performed by: EMERGENCY MEDICINE

## 2023-06-03 PROCEDURE — 83605 ASSAY OF LACTIC ACID: CPT | Performed by: PHYSICIAN ASSISTANT

## 2023-06-03 PROCEDURE — 94760 N-INVAS EAR/PLS OXIMETRY 1: CPT

## 2023-06-03 PROCEDURE — G1004 CDSM NDSC: HCPCS

## 2023-06-03 PROCEDURE — 85027 COMPLETE CBC AUTOMATED: CPT | Performed by: PHYSICIAN ASSISTANT

## 2023-06-03 PROCEDURE — 84145 PROCALCITONIN (PCT): CPT | Performed by: PHYSICIAN ASSISTANT

## 2023-06-03 PROCEDURE — 93010 ELECTROCARDIOGRAM REPORT: CPT | Performed by: INTERNAL MEDICINE

## 2023-06-03 PROCEDURE — 83605 ASSAY OF LACTIC ACID: CPT | Performed by: EMERGENCY MEDICINE

## 2023-06-03 PROCEDURE — 71260 CT THORAX DX C+: CPT

## 2023-06-03 PROCEDURE — 80048 BASIC METABOLIC PNL TOTAL CA: CPT | Performed by: PHYSICIAN ASSISTANT

## 2023-06-03 PROCEDURE — 80048 BASIC METABOLIC PNL TOTAL CA: CPT

## 2023-06-03 PROCEDURE — 87040 BLOOD CULTURE FOR BACTERIA: CPT | Performed by: EMERGENCY MEDICINE

## 2023-06-03 PROCEDURE — 74177 CT ABD & PELVIS W/CONTRAST: CPT

## 2023-06-03 PROCEDURE — 96365 THER/PROPH/DIAG IV INF INIT: CPT

## 2023-06-03 PROCEDURE — 85025 COMPLETE CBC W/AUTO DIFF WBC: CPT

## 2023-06-03 PROCEDURE — 96375 TX/PRO/DX INJ NEW DRUG ADDON: CPT

## 2023-06-03 RX ORDER — FLUTICASONE PROPIONATE 50 MCG
1 SPRAY, SUSPENSION (ML) NASAL 2 TIMES DAILY
Status: DISCONTINUED | OUTPATIENT
Start: 2023-06-03 | End: 2023-06-06 | Stop reason: HOSPADM

## 2023-06-03 RX ORDER — INSULIN GLARGINE 100 [IU]/ML
30 INJECTION, SOLUTION SUBCUTANEOUS
Status: DISCONTINUED | OUTPATIENT
Start: 2023-06-03 | End: 2023-06-03

## 2023-06-03 RX ORDER — LEVALBUTEROL INHALATION SOLUTION 1.25 MG/3ML
1.25 SOLUTION RESPIRATORY (INHALATION)
Status: DISCONTINUED | OUTPATIENT
Start: 2023-06-03 | End: 2023-06-06 | Stop reason: HOSPADM

## 2023-06-03 RX ORDER — ACETAMINOPHEN 325 MG/1
650 TABLET ORAL EVERY 6 HOURS PRN
Status: DISCONTINUED | OUTPATIENT
Start: 2023-06-03 | End: 2023-06-06 | Stop reason: HOSPADM

## 2023-06-03 RX ORDER — INSULIN GLARGINE 100 [IU]/ML
30 INJECTION, SOLUTION SUBCUTANEOUS EVERY 12 HOURS SCHEDULED
Status: DISCONTINUED | OUTPATIENT
Start: 2023-06-03 | End: 2023-06-03

## 2023-06-03 RX ORDER — METOPROLOL SUCCINATE 50 MG/1
100 TABLET, EXTENDED RELEASE ORAL DAILY
Status: DISCONTINUED | OUTPATIENT
Start: 2023-06-03 | End: 2023-06-06 | Stop reason: HOSPADM

## 2023-06-03 RX ORDER — METHYLPREDNISOLONE SODIUM SUCCINATE 40 MG/ML
40 INJECTION, POWDER, LYOPHILIZED, FOR SOLUTION INTRAMUSCULAR; INTRAVENOUS EVERY 8 HOURS
Status: DISCONTINUED | OUTPATIENT
Start: 2023-06-03 | End: 2023-06-05

## 2023-06-03 RX ORDER — POTASSIUM CHLORIDE 20 MEQ/1
20 TABLET, EXTENDED RELEASE ORAL 2 TIMES DAILY
Status: DISCONTINUED | OUTPATIENT
Start: 2023-06-03 | End: 2023-06-06 | Stop reason: HOSPADM

## 2023-06-03 RX ORDER — LORAZEPAM 1 MG/1
1 TABLET ORAL
Status: DISCONTINUED | OUTPATIENT
Start: 2023-06-03 | End: 2023-06-06 | Stop reason: HOSPADM

## 2023-06-03 RX ORDER — LORATADINE 10 MG/1
10 TABLET ORAL DAILY
Status: DISCONTINUED | OUTPATIENT
Start: 2023-06-03 | End: 2023-06-06 | Stop reason: HOSPADM

## 2023-06-03 RX ORDER — ATORVASTATIN CALCIUM 40 MG/1
40 TABLET, FILM COATED ORAL
Status: DISCONTINUED | OUTPATIENT
Start: 2023-06-03 | End: 2023-06-06 | Stop reason: HOSPADM

## 2023-06-03 RX ORDER — FORMOTEROL FUMARATE 20 UG/2ML
20 SOLUTION RESPIRATORY (INHALATION)
Status: DISCONTINUED | OUTPATIENT
Start: 2023-06-03 | End: 2023-06-06 | Stop reason: HOSPADM

## 2023-06-03 RX ORDER — SENNOSIDES 8.6 MG
1 TABLET ORAL
Status: DISCONTINUED | OUTPATIENT
Start: 2023-06-03 | End: 2023-06-06 | Stop reason: HOSPADM

## 2023-06-03 RX ORDER — CEFTRIAXONE 1 G/50ML
1000 INJECTION, SOLUTION INTRAVENOUS ONCE
Status: COMPLETED | OUTPATIENT
Start: 2023-06-03 | End: 2023-06-03

## 2023-06-03 RX ORDER — MAGNESIUM HYDROXIDE/ALUMINUM HYDROXICE/SIMETHICONE 120; 1200; 1200 MG/30ML; MG/30ML; MG/30ML
30 SUSPENSION ORAL EVERY 6 HOURS PRN
Status: DISCONTINUED | OUTPATIENT
Start: 2023-06-03 | End: 2023-06-06 | Stop reason: HOSPADM

## 2023-06-03 RX ORDER — BUDESONIDE 0.5 MG/2ML
0.5 INHALANT ORAL
Status: DISCONTINUED | OUTPATIENT
Start: 2023-06-03 | End: 2023-06-06 | Stop reason: HOSPADM

## 2023-06-03 RX ORDER — LORAZEPAM 0.5 MG/1
0.5 TABLET ORAL EVERY 6 HOURS PRN
Status: DISCONTINUED | OUTPATIENT
Start: 2023-06-03 | End: 2023-06-06 | Stop reason: HOSPADM

## 2023-06-03 RX ORDER — TRAZODONE HYDROCHLORIDE 150 MG/1
150 TABLET ORAL
Status: DISCONTINUED | OUTPATIENT
Start: 2023-06-03 | End: 2023-06-06 | Stop reason: HOSPADM

## 2023-06-03 RX ORDER — INSULIN LISPRO 100 [IU]/ML
20 INJECTION, SOLUTION INTRAVENOUS; SUBCUTANEOUS
Status: DISCONTINUED | OUTPATIENT
Start: 2023-06-03 | End: 2023-06-03

## 2023-06-03 RX ORDER — MONTELUKAST SODIUM 10 MG/1
10 TABLET ORAL
Status: DISCONTINUED | OUTPATIENT
Start: 2023-06-03 | End: 2023-06-06 | Stop reason: HOSPADM

## 2023-06-03 RX ORDER — FUROSEMIDE 10 MG/ML
40 INJECTION INTRAMUSCULAR; INTRAVENOUS ONCE
Status: COMPLETED | OUTPATIENT
Start: 2023-06-03 | End: 2023-06-03

## 2023-06-03 RX ORDER — SODIUM CHLORIDE 9 MG/ML
15 INJECTION, SOLUTION INTRAVENOUS CONTINUOUS
Status: DISCONTINUED | OUTPATIENT
Start: 2023-06-03 | End: 2023-06-04

## 2023-06-03 RX ORDER — INSULIN LISPRO 100 [IU]/ML
1-5 INJECTION, SOLUTION INTRAVENOUS; SUBCUTANEOUS
Status: DISCONTINUED | OUTPATIENT
Start: 2023-06-03 | End: 2023-06-03

## 2023-06-03 RX ORDER — INSULIN LISPRO 100 [IU]/ML
1-6 INJECTION, SOLUTION INTRAVENOUS; SUBCUTANEOUS
Status: DISCONTINUED | OUTPATIENT
Start: 2023-06-03 | End: 2023-06-03

## 2023-06-03 RX ORDER — INSULIN LISPRO 100 [IU]/ML
10 INJECTION, SOLUTION INTRAVENOUS; SUBCUTANEOUS
Status: DISCONTINUED | OUTPATIENT
Start: 2023-06-03 | End: 2023-06-03

## 2023-06-03 RX ORDER — TORSEMIDE 20 MG/1
60 TABLET ORAL
Status: DISCONTINUED | OUTPATIENT
Start: 2023-06-03 | End: 2023-06-06 | Stop reason: HOSPADM

## 2023-06-03 RX ORDER — ESCITALOPRAM OXALATE 20 MG/1
20 TABLET ORAL DAILY
Status: DISCONTINUED | OUTPATIENT
Start: 2023-06-03 | End: 2023-06-06 | Stop reason: HOSPADM

## 2023-06-03 RX ORDER — PANTOPRAZOLE SODIUM 40 MG/1
40 TABLET, DELAYED RELEASE ORAL
Status: DISCONTINUED | OUTPATIENT
Start: 2023-06-03 | End: 2023-06-06 | Stop reason: HOSPADM

## 2023-06-03 RX ADMIN — PANTOPRAZOLE SODIUM 40 MG: 40 TABLET, DELAYED RELEASE ORAL at 06:41

## 2023-06-03 RX ADMIN — TRAZODONE HYDROCHLORIDE 150 MG: 150 TABLET ORAL at 21:28

## 2023-06-03 RX ADMIN — LORATADINE 10 MG: 10 TABLET ORAL at 09:46

## 2023-06-03 RX ADMIN — TORSEMIDE 60 MG: 20 TABLET ORAL at 15:37

## 2023-06-03 RX ADMIN — TORSEMIDE 60 MG: 20 TABLET ORAL at 09:46

## 2023-06-03 RX ADMIN — POTASSIUM CHLORIDE 20 MEQ: 1500 TABLET, EXTENDED RELEASE ORAL at 09:46

## 2023-06-03 RX ADMIN — IOHEXOL 100 ML: 350 INJECTION, SOLUTION INTRAVENOUS at 01:18

## 2023-06-03 RX ADMIN — IPRATROPIUM BROMIDE 0.5 MG: 0.5 SOLUTION RESPIRATORY (INHALATION) at 20:18

## 2023-06-03 RX ADMIN — SODIUM CHLORIDE 6 UNITS/HR: 9 INJECTION, SOLUTION INTRAVENOUS at 20:03

## 2023-06-03 RX ADMIN — POTASSIUM CHLORIDE 20 MEQ: 1500 TABLET, EXTENDED RELEASE ORAL at 17:25

## 2023-06-03 RX ADMIN — IPRATROPIUM BROMIDE 0.5 MG: 0.5 SOLUTION RESPIRATORY (INHALATION) at 13:46

## 2023-06-03 RX ADMIN — METOPROLOL SUCCINATE 100 MG: 50 TABLET, EXTENDED RELEASE ORAL at 09:46

## 2023-06-03 RX ADMIN — SODIUM CHLORIDE 15 ML/HR: 0.9 INJECTION, SOLUTION INTRAVENOUS at 15:37

## 2023-06-03 RX ADMIN — SODIUM CHLORIDE, PRESERVATIVE FREE 3 ML: 5 INJECTION INTRAVENOUS at 15:25

## 2023-06-03 RX ADMIN — ATORVASTATIN CALCIUM 40 MG: 40 TABLET, FILM COATED ORAL at 17:25

## 2023-06-03 RX ADMIN — BUDESONIDE 0.5 MG: 0.5 INHALANT RESPIRATORY (INHALATION) at 20:19

## 2023-06-03 RX ADMIN — SODIUM CHLORIDE, PRESERVATIVE FREE 3 ML: 5 INJECTION INTRAVENOUS at 21:28

## 2023-06-03 RX ADMIN — FUROSEMIDE 40 MG: 10 INJECTION, SOLUTION INTRAVENOUS at 00:41

## 2023-06-03 RX ADMIN — LEVALBUTEROL HYDROCHLORIDE 1.25 MG: 1.25 SOLUTION RESPIRATORY (INHALATION) at 13:46

## 2023-06-03 RX ADMIN — SODIUM CHLORIDE 250 ML: 0.9 INJECTION, SOLUTION INTRAVENOUS at 02:40

## 2023-06-03 RX ADMIN — SODIUM CHLORIDE 10 UNITS/HR: 9 INJECTION, SOLUTION INTRAVENOUS at 15:20

## 2023-06-03 RX ADMIN — IPRATROPIUM BROMIDE 0.5 MG: 0.5 SOLUTION RESPIRATORY (INHALATION) at 09:33

## 2023-06-03 RX ADMIN — FLUTICASONE PROPIONATE 1 SPRAY: 50 SPRAY, METERED NASAL at 21:28

## 2023-06-03 RX ADMIN — INSULIN LISPRO 6 UNITS: 100 INJECTION, SOLUTION INTRAVENOUS; SUBCUTANEOUS at 09:39

## 2023-06-03 RX ADMIN — LEVALBUTEROL HYDROCHLORIDE 1.25 MG: 1.25 SOLUTION RESPIRATORY (INHALATION) at 09:33

## 2023-06-03 RX ADMIN — INSULIN LISPRO 6 UNITS: 100 INJECTION, SOLUTION INTRAVENOUS; SUBCUTANEOUS at 12:32

## 2023-06-03 RX ADMIN — LEVALBUTEROL HYDROCHLORIDE 1.25 MG: 1.25 SOLUTION RESPIRATORY (INHALATION) at 20:18

## 2023-06-03 RX ADMIN — INSULIN LISPRO 4 UNITS: 100 INJECTION, SOLUTION INTRAVENOUS; SUBCUTANEOUS at 04:22

## 2023-06-03 RX ADMIN — INSULIN HUMAN 30 UNITS: 100 INJECTION, SOLUTION PARENTERAL at 13:00

## 2023-06-03 RX ADMIN — INSULIN LISPRO 10 UNITS: 100 INJECTION, SOLUTION INTRAVENOUS; SUBCUTANEOUS at 12:31

## 2023-06-03 RX ADMIN — INSULIN LISPRO 20 UNITS: 100 INJECTION, SOLUTION INTRAVENOUS; SUBCUTANEOUS at 09:41

## 2023-06-03 RX ADMIN — FORMOTEROL FUMARATE DIHYDRATE 20 MCG: 20 SOLUTION RESPIRATORY (INHALATION) at 20:19

## 2023-06-03 RX ADMIN — APIXABAN 5 MG: 5 TABLET, FILM COATED ORAL at 09:46

## 2023-06-03 RX ADMIN — ESCITALOPRAM OXALATE 20 MG: 20 TABLET ORAL at 09:46

## 2023-06-03 RX ADMIN — METHYLPREDNISOLONE SODIUM SUCCINATE 40 MG: 40 INJECTION, POWDER, FOR SOLUTION INTRAMUSCULAR; INTRAVENOUS at 15:19

## 2023-06-03 RX ADMIN — LORAZEPAM 1 MG: 1 TABLET ORAL at 21:28

## 2023-06-03 RX ADMIN — METHYLPREDNISOLONE SODIUM SUCCINATE 40 MG: 40 INJECTION, POWDER, FOR SOLUTION INTRAMUSCULAR; INTRAVENOUS at 09:45

## 2023-06-03 RX ADMIN — BUDESONIDE 0.5 MG: 0.5 INHALANT RESPIRATORY (INHALATION) at 09:33

## 2023-06-03 RX ADMIN — MONTELUKAST 10 MG: 10 TABLET, FILM COATED ORAL at 21:28

## 2023-06-03 RX ADMIN — AZITHROMYCIN MONOHYDRATE 500 MG: 500 INJECTION, POWDER, LYOPHILIZED, FOR SOLUTION INTRAVENOUS at 02:34

## 2023-06-03 RX ADMIN — APIXABAN 5 MG: 5 TABLET, FILM COATED ORAL at 17:25

## 2023-06-03 RX ADMIN — CEFTRIAXONE 1000 MG: 1 INJECTION, SOLUTION INTRAVENOUS at 01:19

## 2023-06-03 NOTE — ASSESSMENT & PLAN NOTE
Lab Results   Component Value Date    HGBA1C 9 9 (H) 05/11/2023       Recent Labs     06/03/23  0412   POCGLU 388*       Blood Sugar Average: Last 72 hrs:  (P) 388   · Home regimen: Lantus 30 units at bedtime, Humalog 24 units with breakfast and 12 units with lunch and dinner  · Continue home regimen slight decrease in mealtime insulin to 20 with breakfast and 10 at lunch and dinner due to carb controlled diet  · Of note patient did not take evening Lantus prior to coming to the hospital

## 2023-06-03 NOTE — ASSESSMENT & PLAN NOTE
· Presents with acute worsening of dyspnea over the last 1 week, however states that her breathing has not been doing well over the last couple of months  · Home regimen: Bevespi twice daily -she is to use Pulmicort nebulizer twice daily, however her nebulizer machine broke so she has not done that for a while  · CT without focal consolidation, hold on further antibiotics for now  · Xopenex and Atrovent 3 times daily  · Pulmicort and Perforomist twice daily  · Solu-Medrol 40 Mg every 8 hours-> transition to bid tomorrow 6/5  · Pulmonology following

## 2023-06-03 NOTE — CONSULTS
Consultation - Pulmonary Medicine   Reji Collazo 57 y o  female MRN: [id number]  Unit/Bed#: -01 Encounter: [id number]      Assessment:  Acute on chronic hypoxic respiratory failure  Probable hypercapnic respiratory failure  Severe COPD with exacerbation  Restrictive lung disease due to morbid obesity and underlying WILMA  Lactic acidosis  Hyperglycemia in the setting of uncontrolled diabetes  Chronic diastolic heart failure with decompensation  Paroxysmal A-fib    Plan: On the inpatient side would recheck lactic acid as well is a VBG as a suspect she has some chronic hypercapnia  Patient is currently on CPAP nightly will transition to BiPAP if hypercapnic  On the inpatient standpoint she is suffering from a severe exacerbation of her COPD agree with IV Solu-Medrol and all nebulized solutions  At home she is on Breztri consider transitioning to all nebulized solutions but for now we will do Pulmicort Perforomist Xopenex and Atrovent  Patient weight is up and she has abdominal distention and fluid in her legs IV diuresis as tolerated per heart failure team  Hyperglycemia will need to be managed with IV insulin  On the outpatient standpoint patient would most certainly benefit from injectables  She is got a very modest smoking history and severe COPD likely related to uncontrolled asthma, eosinophilia in the past would likely respond very favorably to injectables  Unfortunately patient's not a candidate for endobronchial valves or lung transplantation given her morbid obesity    History of Present Illness   Physician Requesting Consult: Luzmaria Patel MD  Reason for Consult / Principal Problem: COPD    HPI: Reji Collazo is a 57y o  year old female who presents with increased shortness of breath over the several weeks  She has chronic severe COPD with chronic dyspnea on 4 L she is unable to walk around the house without shortness of breath    She certainly is unable to go out and walk stores without a scooter and has a hard time leaving her house even to go to the mailbox  She takes Breztri 2 puffs twice a day with no spacer and uses albuterol as needed  She reports abdominal swelling and increased leg swelling but denies any cough productive of mucus or sick contacts  She is very much upset about her quality of life due to worsening dyspnea, trialed pulmonary rehab but was unable to do it  Ironically she only smoked for 10 years a pack per week  Inpatient consult to Pulmonology  Consult performed by: Redd Bryant DO  Consult ordered by: Presley Cushing, PA-C          Review of Systems   Constitutional: Positive for fatigue  Negative for unexpected weight change  HENT: Negative  Negative for postnasal drip  Eyes: Negative  Respiratory: Positive for cough and shortness of breath  Negative for wheezing  Cardiovascular: Positive for leg swelling  Negative for chest pain  Gastrointestinal: Positive for abdominal distention  Endocrine: Negative  Genitourinary: Negative  Musculoskeletal: Negative  Allergic/Immunologic: Negative  Neurological: Negative  Hematological: Negative  Historical Information   Past Medical History:   Diagnosis Date   • Acute blood loss anemia 6/1/2021   • Acute diverticulitis 5/2/2021   • Alcohol abuse 5/21/2020   • Anemia    • Atrial fibrillation [facility])    • Cervical radiculopathy    • CHF (congestive heart failure) ([facility])    • Chronic low back pain    • Chronic obstructive asthma ([facility] [address] ) 2/20/2018   • Cigarette nicotine dependence without complication 33/64/0756    Quit 12/10/2019      • Community acquired pneumonia 5/21/2020   • Depression with anxiety 3/9/2014   • Diabetes mellitus with hyperglycemia ([facility] )    • Diverticulitis 6/6/2021   • Elevated liver enzymes    • GERD (gastroesophageal reflux disease)    • Hypersomnolence 10/28/2020   • Hypokalemia 12/4/2018   • Paresthesia of upper extremity    • Plantar fasciitis    • Restless legs syndrome 2014   • Sexual dysfunction    • Sleep apnea, obstructive    • Tenosynovitis of finger    • Tinea corporis    • Tobacco use 2018    Currently smoking 3-4 cigarettes daily   • Trigger middle finger of left hand 2017   • Trigger ring finger of left hand 2017   • Weakness of upper extremity      Past Surgical History:   Procedure Laterality Date   • ABDOMINAL SURGERY     • CARPAL TUNNEL RELEASE Bilateral    •  SECTION     • CHOLECYSTECTOMY      laparoscopic   • ESOPHAGOGASTRODUODENOSCOPY N/A 12/3/2018    Procedure: ESOPHAGOGASTRODUODENOSCOPY (EGD) AND COLONOSCOPY;  Surgeon: Talita Correa MD;  Location: [facility];  Service: Gastroenterology   • GASTRIC BYPASS      for morbid obesity with gilda en y   • HERNIA REPAIR     • HYSTERECTOMY     • CT EXCISION GANGLION WRIST DORSAL/VOLAR PRIMARY Left 2017    Procedure: LONG FINGER GANGLION CYST EXCISION;  Surgeon: Ifrah Villalobos MD;  Location: [facility];  Service: Orthopedics   • CT TENDON SHEATH INCISION Left 2017    Procedure: [place] Anon, RING, TRIGGER FINGER RELEASE;  Surgeon: Ifrah Villalobos MD;  Location: [facility];  Service: Orthopedics   • SHOULDER SURGERY Right      Social History   Social History     Substance and Sexual Activity   Alcohol Use Not Currently   • Alcohol/week: 20 0 standard drinks of alcohol   • Types: 20 Cans of beer per week    Comment: about 6 coors light every night, stopped in 2019     Social History     Substance and Sexual Activity   Drug Use No     E-Cigarette/Vaping   • E-Cigarette Use Never User      E-Cigarette/Vaping Substances   • Nicotine No    • THC No    • CBD No    • Flavoring No    • Other No    • Unknown No      Social History     Tobacco Use   Smoking Status Former   • Packs/day: 0 25   • Years: 29 00   • Total pack years: 7 25   • Types: Cigarettes   • Start date: 200   • Quit date: 12/10/2019   • Years since quitting: 3 4   Smokeless Tobacco Never     Occupational History: Not "applicable    Family History: non-contributory    Meds/Allergies   all current active meds have been reviewed    Allergies   Allergen Reactions   • Iron Dextran Swelling   • Januvia [Sitagliptin] Shortness Of Breath   • Jardiance [Empagliflozin] Shortness Of Breath   • Clonazepam Other (See Comments)     Unknown reaction   • Codeine Itching and Other (See Comments)     itch;mary kay morphine,takes ultram @home   • Adhesive [Medical Tape] Itching     itching   • Effexor [Venlafaxine] Itching   • Lactose - Food Allergy GI Intolerance   • Oxycodone-Acetaminophen Anxiety   • Oxycodone-Acetaminophen Itching and Rash     Other reaction(s): Other (See Comments)  (PERCOCET) MILD RASH, not allergic to Acetaminophen        Objective   Vitals: Blood pressure 132/68, pulse 89, temperature 100 3 °F (37 9 °C), resp  rate 18, height 5' 3\" (1 6 m), weight 130 kg (286 lb), SpO2 95 %, not currently breastfeeding  ,Body mass index is 50 66 kg/m²  Intake/Output Summary (Last 24 hours) at 6/3/2023 1510  Last data filed at 6/3/2023 0350  Gross per 24 hour   Intake 350 ml   Output 800 ml   Net -450 ml     Invasive Devices     Peripheral Intravenous Line  Duration           Peripheral IV 06/02/23 Left Antecubital <1 day                Physical Exam  Constitutional:       Appearance: She is well-developed  HENT:      Head: Normocephalic and atraumatic  Eyes:      Pupils: Pupils are equal, round, and reactive to light  Cardiovascular:      Rate and Rhythm: Normal rate and regular rhythm  Heart sounds: No murmur heard  Pulmonary:      Effort: Pulmonary effort is normal  No respiratory distress  Breath sounds: Decreased breath sounds present  No wheezing or rales  Abdominal:      Palpations: Abdomen is soft  Musculoskeletal:      Cervical back: Normal range of motion and neck supple  Right lower leg: Edema present  Left lower leg: Edema present  Skin:     General: Skin is warm and dry     Neurological:      Mental " Status: She is alert and oriented to person, place, and time           Lab Results:   CBC:   Lab Results   Component Value Date    HCT 33 0 (L) 06/03/2023    HGB 9 7 (L) 06/03/2023    MCH 20 3 (L) 06/03/2023    MCHC 29 4 (L) 06/03/2023    MCV 69 (L) 06/03/2023    MPV 11 0 06/03/2023    NRBC 0 06/02/2023     06/03/2023    RBC 4 79 06/03/2023    RDW 18 5 (H) 06/03/2023    WBC 16 61 (H) 06/03/2023   , CMP:   Lab Results   Component Value Date    ALKPHOS 157 (H) 06/02/2023    ALT 37 06/02/2023    AST 24 06/02/2023    BUN 17 06/03/2023    CALCIUM 8 7 06/03/2023    CL 92 (L) 06/03/2023    CO2 32 06/03/2023    CREATININE 0 77 06/03/2023    EGFR 81 06/03/2023    K 4 1 06/03/2023    SODIUM 137 06/03/2023     Imaging Studies: I have personally reviewed pertinent films in PACS  EKG, Pathology, and Other Studies: I have personally reviewed pertinent films in PACS  VTE Prophylaxis: Reason for no pharmacologic prophylaxis On Eliquis    Code Status: Level 1 - Full Code  Advance Directive and Living Will:      Power of :    POLST:      None

## 2023-06-03 NOTE — ASSESSMENT & PLAN NOTE
Wt Readings from Last 3 Encounters:   06/02/23 130 kg (286 lb)   05/17/23 130 kg (286 lb)   03/29/23 132 kg (290 lb)     · Last echo October 2022: LVEF 60%  G2 DD  No significant valvular disease  · Home regimen:  Torsemide 60 Mg twice daily  · Patient given Lasix 40 in the ED, however does not examine fluid overloaded and CT does not show pulmonary congestion  · Resume home torsemide  · I/os Daily weights  · Sodium and fluid restriction

## 2023-06-03 NOTE — ED PROVIDER NOTES
History  Chief Complaint   Patient presents with   • Shortness of Breath     Pt states that she wear 4L NC all the time  Pt states that she started on Wednesday with an increase of SOB  Pt states that she is more SOB with activity  Pt states that she has an increase in leg swelling  Pt denies cough      History from patient, her friend and medical records  She was brought in by her friend with concern for worsening shortness of breath over the last week with associated right lower quadrant abdominal pain distention and bilateral leg swelling mild clear mucus cough, and 1-2 episodes of diarrhea daily  She is on 4 L oxygen chronically  Past medical history includes CHF COPD diverticulitis IDDM, A-fib, and diabetes  Pieces of her nebulizer machine have been broken and she has run out of nasal cannula's  Usually she can walk 20 feet or so but now she is not able to do that  The diarrhea has been several months now she was supposed to get a C  difficile test but never did  Prior to Admission Medications   Prescriptions Last Dose Informant Patient Reported? Taking?    Benralizumab 30 MG/ML SOAJ  Self No No   Sig: Inject 1 mL under the skin every 28 days   Benralizumab 30 MG/ML SOAJ  Self No No   Sig: Inject 30 mg under the skin every 56 days   Blood Glucose Monitoring Suppl (Construct CONTOUR NEXT MONITOR) w/Device KIT  Self Yes Yes   Sig: Use daily   [employer] Lancets Thin 26G MISC  Self No No   Sig: USE 3 (THREE) TIMES A DAY   Cholecalciferol 1000 units tablet More than a month Self Yes No   Sig: Take 1,000 Units by mouth daily   Patient not taking: Reported on 2/8/2023   Contour Next Test test strip  Self No No   Sig: USE TO TEST BLOOD SUGAR 3 TIMES A DAY   EPINEPHrine (EPIPEN) 0 3 mg/0 3 mL SOAJ  Self No No   Sig: Inject 0 3 mL (0 3 mg total) into a muscle once for 1 dose   EPINEPHrine (EPIPEN) 0 3 mg/0 3 mL SOAJ  Self No No   Sig: Inject 0 3 mL (0 3 mg total) into a muscle once for 1 dose   Eliquis 5 MG 6/2/2023 Self No Yes   Sig: TAKE 1 TABLET BY MOUTH TWICE A DAY   Insulin Glargine Solostar (Lantus SoloStar) 100 UNIT/ML SOPN Past Week Self No Yes   Sig: Inject 0 3 mL (30 Units total) under the skin daily at bedtime   Insulin Pen Needle (BD Pen Needle Love 2nd Gen) 32G X 4 MM MISC  Self No No   Sig: Use daily at bedtime Use 4 new needles daily      LORazepam (ATIVAN) 0 5 mg tablet 2023  No Yes   Sig: TAKE 2 TABLETS BY MOUTH AT BEDTIME AND 1 EVERY 6 HOURS AS NEEDED FOR ANXIETY   albuterol (2 5 mg/3 mL) 0 083 % nebulizer solution More than a month Self No No   Sig: Take 3 mL (2 5 mg total) by nebulization every 6 (six) hours as needed for wheezing or shortness of breath   albuterol (PROVENTIL HFA,VENTOLIN HFA) 90 mcg/act inhaler  Self No Yes   Sig: Inhale 2 puffs every 4 (four) hours as needed for wheezing   albuterol (ProAir HFA) 90 mcg/act inhaler 2023 Self No Yes   Sig: Inhale 2 puffs every 6 (six) hours as needed for wheezing or shortness of breath   ascorbic acid (VITAMIN C) 1000 MG tablet More than a month Self Yes No   Sig: Take 1,000 mg by mouth daily   Patient not taking: Reported on 3/29/2023   atorvastatin (LIPITOR) 40 mg tablet 2023 Self No Yes   Sig: Take 1 tablet (40 mg total) by mouth daily   budesonide (PULMICORT) 0 5 mg/2 mL nebulizer solution More than a month Self No No   Sig: TAKE 2 ML (0 5 MG TOTAL) BY NEBULIZATION TWICE A DAY RINSE MOUTH AFTER USE   cyanocobalamin (VITAMIN B-12) 100 mcg tablet More than a month Self Yes No   Sig: Take by mouth daily   Patient not taking: Reported on 2023   escitalopram (LEXAPRO) 20 mg tablet 2023 Self No Yes   Sig: TAKE 1 TABLET BY MOUTH EVERY DAY   ferrous sulfate 220 (44 Fe) mg/5 mL solution More than a month Self No No   Sig: TAKE 5 ML (220 MG TOTAL) BY MOUTH 2 (TWO) TIMES A DAY BEFORE MEALS   fluticasone (FLONASE) 50 mcg/act nasal spray  Self No No   Si spray into each nostril 2 (two) times a day   glycopyrrolate-formoterol (BEVESPI AEROSPHERE) 9-4 8 MCG/ACT inhaler 2023 Self No Yes   Sig: Inhale 2 puffs 2 (two) times a day   insulin glulisine (Apidra SoloStar) 100 units/mL injection pen 2023 Self No Yes   Si UNITS BREAKFAST 12 UNITS AT LUNCH AND DINNER   levalbuterol (XOPENEX) 1 25 mg/0 5 mL nebulizer solution More than a month Self No No   Sig: Take 0 5 mL (1 25 mg total) by nebulization 2 (two) times a day   loratadine (CLARITIN) 10 mg tablet 2023 Self Yes Yes   Sig: Take by mouth   metFORMIN (GLUCOPHAGE-XR) 500 mg 24 hr tablet 2023 Self No Yes   Sig: TAKE 2 TABLETS BY MOUTH TWICE A DAY WITH FOOD   metolazone (ZAROXOLYN) 5 mg tablet Not Taking Self No No   Sig: Take 1 tablet (5 mg total) by mouth daily   Patient not taking: Reported on 6/3/2023   metoprolol succinate (TOPROL-XL) 100 mg 24 hr tablet 6/3/2023 Self No Yes   Sig: Take 1 tablet (100 mg total) by mouth daily   montelukast (SINGULAIR) 10 mg tablet 2023 Self No Yes   Sig: TAKE 1 TABLET BY MOUTH DAILY AT BEDTIME   naloxone (NARCAN) 4 mg/0 1 mL nasal spray  Self No No   Sig: Administer 1 spray into a nostril  If breathing does not return to normal or if breathing difficulty resumes after 2-3 minutes, give another dose in the other nostril using a new spray     omeprazole (PriLOSEC) 40 MG capsule 2023 Self No Yes   Sig: Take 1 capsule (40 mg total) by mouth daily   potassium chloride (Klor-Con M20) 20 mEq tablet 2023 Self No Yes   Sig: Take 1 tablet (20 mEq total) by mouth 2 (two) times a day   semaglutide, 0 25 or 0 5 mg/dose, (Ozempic, 0 25 or 0 5 MG/DOSE,) 2 mg/3 mL injection pen   No No   Sig: Inject 0 75 mL (0 5 mg total) under the skin every 7 days   torsemide (DEMADEX) 20 mg tablet 2023 Self No Yes   Sig: Take 3 tablets (60 mg total) by mouth 2 (two) times a day   traZODone (DESYREL) 150 mg tablet Past Week  No Yes   Sig: Take 1 tablet (150 mg total) by mouth daily at bedtime      Facility-Administered Medications: None       Past Medical History:   Diagnosis Date   • Acute blood loss anemia 2021   • Acute diverticulitis 2021   • Alcohol abuse 2020   • Anemia    • Atrial fibrillation [facility])    • Cervical radiculopathy    • CHF (congestive heart failure) ([facility])    • Chronic low back pain    • Chronic obstructive [facility] ) 2018   • Cigarette nicotine dependence without complication     Quit 12/10/2019      • Community acquired pneumonia 2020   • Depression with anxiety 3/9/2014   • Diabetes mellitus with hyperglycemia ([facility] )    • Diverticulitis 2021   • Elevated liver enzymes    • GERD (gastroesophageal reflux disease)    • Hypersomnolence 10/28/2020   • Hypokalemia 2018   • Paresthesia of upper extremity    • Plantar fasciitis    • Restless legs syndrome 2014   • Sexual dysfunction    • Sleep apnea, obstructive    • Tenosynovitis of finger    • Tinea corporis    • Tobacco use 2018    Currently smoking 3-4 cigarettes daily   • Trigger middle finger of left hand 2017   • Trigger ring finger of left hand 2017   • Weakness of upper extremity        Past Surgical History:   Procedure Laterality Date   • ABDOMINAL SURGERY     • CARPAL TUNNEL RELEASE Bilateral    •  SECTION     • CHOLECYSTECTOMY      laparoscopic   • ESOPHAGOGASTRODUODENOSCOPY N/A 12/3/2018    Procedure: ESOPHAGOGASTRODUODENOSCOPY (EGD) AND COLONOSCOPY;  Surgeon: Rock Anna MD;  Location: [facility];  Service: Gastroenterology   • GASTRIC BYPASS      for morbid obesity with gilda en y   • HERNIA REPAIR     • HYSTERECTOMY     • HI EXCISION GANGLION WRIST DORSAL/VOLAR PRIMARY Left 2017    Procedure: LONG FINGER GANGLION CYST EXCISION;  Surgeon: Tiara Lomas MD;  [facility];  Service: Orthopedics   • HI TENDON SHEATH INCISION Left 2017    Procedure: Madeline Lacey RING, TRIGGER FINGER RELEASE;  Surgeon: Tiara Lomas MD;  Location[facility];  Service: Orthopedics   • SHOULDER SURGERY Right Family History   Problem Relation Age of Onset   • Alzheimer's disease Mother    • Atrial fibrillation Mother    • Dementia Mother    • Heart disease Mother         heart problem   • Seizures Mother    • Parkinsonism Father    • Arthritis Father    • Atrial fibrillation Father    • No Known Problems Daughter    • Cri-du-chat syndrome Daughter    • Colon cancer Maternal Grandmother [de-identified]   • Diabetes type II Maternal Grandmother    • No Known Problems Brother    • No Known Problems Son    • Substance Abuse Neg Hx         mother,father   • Mental illness Neg Hx         mother,father   • Colon polyps Neg Hx      I have reviewed and agree with the history as documented  E-Cigarette/Vaping   • E-Cigarette Use Never User      E-Cigarette/Vaping Substances   • Nicotine No    • THC No    • CBD No    • Flavoring No    • Other No    • Unknown No      Social History     Tobacco Use   • Smoking status: Former     Packs/day: 0 25     Years: 29 00     Total pack years: 7 25     Types: Cigarettes     Start date: 200     Quit date: 12/10/2019     Years since quitting: 3 4   • Smokeless tobacco: Never   Vaping Use   • Vaping Use: Never used   Substance Use Topics   • Alcohol use: Not Currently     Alcohol/week: 20 0 standard drinks of alcohol     Types: 20 Cans of beer per week     Comment: about 6 coors light every night, stopped in 2019   • Drug use: No       Review of Systems   Constitutional: Negative for chills and fever  HENT: Negative for rhinorrhea and sore throat  Respiratory: Positive for cough and shortness of breath  Cardiovascular: Negative for chest pain  Gastrointestinal: Positive for abdominal pain and diarrhea  Negative for constipation, nausea and vomiting  Genitourinary: Negative for dysuria and frequency  Skin: Negative for rash  All other systems reviewed and are negative  Physical Exam  Physical Exam  Vitals and nursing note reviewed     Constitutional:       Appearance: She is well-developed  HENT:      Head: Normocephalic and atraumatic  Right Ear: External ear normal       Left Ear: External ear normal       Nose: Nose normal    Eyes:      Conjunctiva/sclera: Conjunctivae normal       Pupils: Pupils are equal, round, and reactive to light  Cardiovascular:      Rate and Rhythm: Normal rate and regular rhythm  Heart sounds: Normal heart sounds  Pulmonary:      Effort: Pulmonary effort is normal  Tachypnea present  No accessory muscle usage or respiratory distress  Breath sounds: Decreased breath sounds and wheezing present  Abdominal:      General: Bowel sounds are increased  There is distension  Palpations: Abdomen is soft  Tenderness: There is abdominal tenderness in the right lower quadrant  There is no guarding or rebound  Negative signs include psoas sign and obturator sign  Musculoskeletal:         General: No deformity  Normal range of motion  Cervical back: Normal range of motion and neck supple  No spinous process tenderness  Right lower leg: Edema present  Left lower leg: Edema present  Skin:     General: Skin is warm and dry  Findings: No rash  Neurological:      General: No focal deficit present  Mental Status: She is alert  GCS: GCS eye subscore is 4  GCS verbal subscore is 5  GCS motor subscore is 6  Sensory: No sensory deficit     Psychiatric:         Mood and Affect: Mood normal          Vital Signs  ED Triage Vitals   Temperature Pulse Respirations Blood Pressure SpO2   06/02/23 2236 06/02/23 2236 06/02/23 2236 06/02/23 2236 06/02/23 2236   97 8 °F (36 6 °C) 89 (!) 25 138/76 94 %      Temp Source Heart Rate Source Patient Position - Orthostatic VS BP Location FiO2 (%)   06/02/23 2236 06/02/23 2236 06/02/23 2336 -- --   Temporal Monitor Sitting        Pain Score       --                  Vitals:    06/02/23 2236 06/02/23 2336 06/03/23 0030   BP: 138/76 148/73 (!) 174/77   Pulse: 89 90 98   Patient Position - Orthostatic VS:  Sitting          Visual Acuity      ED Medications  Medications   sodium chloride (PF) 0 9 % injection 3 mL ( Intravenous Canceled Entry 6/3/23 0023)   azithromycin (ZITHROMAX) 500 mg in sodium chloride 0 9% 250mL IVPB 500 mg (has no administration in time range)   sodium chloride 0 9 % bolus 250 mL (has no administration in time range)   methylPREDNISolone sodium succinate (Solu-MEDROL) injection 100 mg (100 mg Intravenous Given 6/2/23 2328)   albuterol inhalation solution 10 mg (10 mg Nebulization Given 6/2/23 2336)     And   ipratropium (ATROVENT) 0 02 % inhalation solution 0 5 mg (0 5 mg Nebulization Given 6/2/23 2336)     And   sodium chloride 0 9 % inhalation solution 3 mL (3 mL Nebulization Given 6/2/23 2336)   furosemide (LASIX) injection 40 mg (40 mg Intravenous Given 6/3/23 0041)   cefTRIAXone (ROCEPHIN) IVPB (premix in dextrose) 1,000 mg 50 mL (0 mg Intravenous Stopped 6/3/23 0206)   iohexol (OMNIPAQUE) 350 MG/ML injection (SINGLE-DOSE) 100 mL (100 mL Intravenous Given 6/3/23 0118)       Diagnostic Studies  Results Reviewed     Procedure Component Value Units Date/Time    Lactic acid 2 Hours [[id number]]  (Abnormal) Collected: 06/03/23 0123    Lab Status: Final result Specimen: Blood from Arm, Left Updated: 06/03/23 0220     LACTIC ACID 3 7 mmol/L     Narrative:      Result may be elevated if tourniquet was used during collection      HS Troponin I 2hr [[id number]]  (Normal) Collected: 06/03/23 0031    Lab Status: Final result Specimen: Blood from Hand, Left Updated: 06/03/23 0117     hs TnI 2hr 6 ng/L      Delta 2hr hsTnI 1 ng/L     UA w Reflex to Microscopic w Reflex to Culture [[id number]] Collected: 06/03/23 0038    Lab Status: Final result Specimen: Urine, Other Updated: 06/03/23 0105     Color, UA Light Yellow     Clarity, UA Clear     Specific Gravity, UA 1 010     pH, UA 5 5     Leukocytes, UA Negative     Nitrite, UA Negative     Protein, UA Negative mg/dl      Glucose, UA Negative mg/dl      Ketones, UA Negative mg/dl      Urobilinogen, UA <2 0 mg/dl      Bilirubin, UA Negative     Occult Blood, UA Negative    Lactic acid [[id number]]  (Abnormal) Collected: 06/02/23 2318    Lab Status: Final result Specimen: Blood from Arm, Left Updated: 06/03/23 0052     LACTIC ACID 2 7 mmol/L     Narrative:      Result may be elevated if tourniquet was used during collection  Blood culture #1 [[id number]] Collected: 06/03/23 0031    Lab Status: In process Specimen: Blood from Hand, Left Updated: 06/03/23 0035    D-dimer, quantitative [[id number]]  (Normal) Collected: 06/02/23 2318    Lab Status: Final result Specimen: Blood from Arm, Left Updated: 06/03/23 0023     D-Dimer, Quant <0 27 ug/ml FEU     Narrative: In the evaluation for possible pulmonary embolism, in the appropriate (Well's Score of 4 or less) patient, the age adjusted d-dimer cutoff for this patient can be calculated as:    Age x 0 01 (in ug/mL) for Age-adjusted D-dimer exclusion threshold for a patient over 50 years  FLU/RSV/COVID - if FLU/RSV clinically relevant [[id number]]  (Normal) Collected: 06/02/23 2318    Lab Status: Final result Specimen: Nares from Nose Updated: 06/03/23 0009     SARS-CoV-2 Negative     INFLUENZA A PCR Negative     INFLUENZA B PCR Negative     RSV PCR Negative    Narrative:      FOR PEDIATRIC PATIENTS - copy/paste COVID Guidelines URL to browser: [email or web address]    SARS-CoV-2 assay is a Nucleic Acid Amplification assay intended for the  qualitative detection of nucleic acid from SARS-CoV-2 in nasopharyngeal  swabs  Results are for the presumptive identification of SARS-CoV-2 RNA  Positive results are indicative of infection with SARS-CoV-2, the virus  causing COVID-19, but do not rule out bacterial infection or co-infection  with other viruses   Laboratories within the [place] and its  territories are required to report all positive results to the appropriate  public health authorities  Negative results do not preclude SARS-CoV-2  infection and should not be used as the sole basis for treatment or other  patient management decisions  Negative results must be combined with  clinical observations, patient history, and epidemiological information  This test has not been FDA cleared or approved  This test has been authorized by FDA under an Emergency Use Authorization  (EUA)  This test is only authorized for the duration of time the  declaration that circumstances exist justifying the authorization of the  emergency use of an in vitro diagnostic tests for detection of SARS-CoV-2  virus and/or diagnosis of COVID-19 infection under section 564(b)(1) of  the Act, 21 U  S C  488NMD-5(A)(1), unless the authorization is terminated  or revoked sooner  The test has been validated but independent review by FDA  and CLIA is pending  Test performed using [employer] GeneXpert: This RT-PCR assay targets N2,  a region unique to SARS-CoV-2  A conserved region in the E-gene was chosen  for pan-Sarbecovirus detection which includes SARS-CoV-2  According to CMS-2020-01-R, this platform meets the definition of high-throughput technology      HS Troponin I 4hr [[id number]]     Lab Status: No result Specimen: Blood     Procalcitonin [[id number]]  (Normal) Collected: 06/02/23 2318    Lab Status: Final result Specimen: Blood from Arm, Left Updated: 06/03/23 0002     Procalcitonin 0 05 ng/ml     HS Troponin 0hr (reflex protocol) [[id number]]  (Normal) Collected: 06/02/23 2318    Lab Status: Final result Specimen: Blood from Arm, Left Updated: 06/02/23 2358     hs TnI 0hr 5 ng/L     B-Type Natriuretic Peptide(BNP) [[id number]]  (Abnormal) Collected: 06/02/23 2318    Lab Status: Final result Specimen: Blood from Arm, Left Updated: 06/02/23 2357      pg/mL     Comprehensive metabolic panel [[id number]]  (Abnormal) Collected: 06/02/23 2318    Lab Status: Final result Specimen: Blood from Arm, Left Updated: 06/02/23 2354     Sodium 137 mmol/L      Potassium 3 8 mmol/L      Chloride 92 mmol/L      CO2 35 mmol/L      ANION GAP 10 mmol/L      BUN 16 mg/dL      Creatinine 0 76 mg/dL      Glucose 222 mg/dL      Calcium 9 3 mg/dL      AST 24 U/L      ALT 37 U/L      Alkaline Phosphatase 157 U/L      Total Protein 7 6 g/dL      Albumin 4 3 g/dL      Total Bilirubin 0 39 mg/dL      eGFR 83 ml/min/1 73sq m     Narrative:      National Kidney Disease Foundation guidelines for Chronic Kidney Disease (CKD):   •  Stage 1 with normal or high GFR (GFR > 90 mL/min/1 73 square meters)  •  Stage 2 Mild CKD (GFR = 60-89 mL/min/1 73 square meters)  •  Stage 3A Moderate CKD (GFR = 45-59 mL/min/1 73 square meters)  •  Stage 3B Moderate CKD (GFR = 30-44 mL/min/1 73 square meters)  •  Stage 4 Severe CKD (GFR = 15-29 mL/min/1 73 square meters)  •  Stage 5 End Stage CKD (GFR <15 mL/min/1 73 square meters)  Note: GFR calculation is accurate only with a steady state creatinine    Protime-INR [[id number]]  (Abnormal) Collected: 06/02/23 2318    Lab Status: Final result Specimen: Blood from Arm, Left Updated: 06/02/23 2349     Protime 14 9 seconds      INR 1 09    APTT [[id number]]  (Normal) Collected: 06/02/23 2318    Lab Status: Final result Specimen: Blood from Arm, Left Updated: 06/02/23 2349     PTT 32 seconds     CBC and differential [[id number]]  (Abnormal) Collected: 06/02/23 2318    Lab Status: Final result Specimen: Blood from Arm, Left Updated: 06/02/23 2332     WBC 14 38 Thousand/uL      RBC 5 06 Million/uL      Hemoglobin 10 1 g/dL      Hematocrit 35 3 %      MCV 70 fL      MCH 20 0 pg      MCHC 28 6 g/dL      RDW 18 5 %      MPV 11 1 fL      Platelets 283 Thousands/uL      nRBC 0 /100 WBCs      Neutrophils Relative 78 %      Immat GRANS % 1 %      Lymphocytes Relative 15 %      Monocytes Relative 6 %      Eosinophils Relative 0 %      Basophils Relative 0 %      Neutrophils Absolute 11 28 Thousands/µL      Immature Grans Absolute 0 09 Thousand/uL      Lymphocytes Absolute 2 17 Thousands/µL      Monocytes Absolute 0 79 Thousand/µL      Eosinophils Absolute 0 00 Thousand/µL      Basophils Absolute 0 05 Thousands/µL     Blood culture #2 [[id number]] Collected: 06/02/23 2318    Lab Status: In process Specimen: Blood from Arm, Left Updated: 06/02/23 2328    Stool Enteric Bacterial Panel by PCR [[id number]]     Lab Status: No result Specimen: Stool     Clostridium difficile toxin by PCR with EIA [[id number]]     Lab Status: No result Specimen: Stool from Per Rectum                  CT chest abdomen pelvis w contrast   Final Result by Bhargav Parr DO (06/03 0147)      Heterogeneous hepatic attenuation, may be secondary to steatosis  If there is concern for hepatitis, correlate with liver function and hepatitis testing  Mosaic attenuation throughout the lungs suggesting air trapping/small airway disease  This is similar to multiple prior examinations  The study was marked in [facility] for immediate notification        Workstation performed: [id number]         XR chest portable   ED Interpretation by Henrine Cogan, DO (06/03 0022)   Bilateral hazy at bases possible pneumonia bilateral and/ or chf - similar to 2 previous xrays - interpreted by me           Bilateral hazy at bases interpreted by me Henrine Cogan, DO      Procedures  ECG 12 Lead Documentation Only    Date/Time: 6/2/2023 11:21 PM    Performed by: Henrine Cogan, DO  Authorized by: Henrine Cogan, DO    Indications / Diagnosis:  Sob  ECG reviewed by me, the ED Provider: yes    Patient location:  ED  Previous ECG:     Previous ECG:  Compared to current    Comparison ECG info:  2021    Similarity:  No change  Interpretation:     Interpretation: normal    Rate:     ECG rate:  88    ECG rate assessment: normal    Rhythm:     Rhythm: sinus rhythm    Ectopy:     Ectopy: none    QRS:     QRS axis:  Normal    QRS intervals:  Normal  Conduction: Conduction: normal    ST segments:     ST segments:  Normal  T waves:     T waves: normal    Comments: This EKG was interpreted by me  ED Course  ED Course as of 06/03/23 0232   Fri Jun 02, 2023   2347 Patient with purse-lipped breathing but can talk in full sentences                                             Medical Decision Making  Differential includes acute exacerbation of chronic lung disease, acute pneumonia, acute exacerbation of chf, less likely PE with nl d-dimer, less likely arrhthymias or acute coronary syndrome  Patient also with abd pain - probable gas pain - no sign of appendicitis or intestinal infection on imaging  Amount and/or Complexity of Data Reviewed  Labs: ordered  Radiology: ordered and independent interpretation performed  Risk  Prescription drug management  Decision regarding hospitalization  Disposition  Final diagnoses:   Respiratory distress   Pneumonia   CHF (congestive heart failure) ([facility] )   Abdominal pain     Time reflects when diagnosis was documented in both MDM as applicable and the Disposition within this note     Time User Action Codes Description Comment    6/3/2023  1:04 AM Jiemna Mealing Add [R06 03] Respiratory distress     6/3/2023  1:04 AM Jimena Mealing Add [J18 9] Pneumonia     6/3/2023  1:04 AM Jimena Mealing Add [I50 9] CHF (congestive heart failure) ([facility] )     6/3/2023  1:04 AM Jimena Mealing Add [R10 9] Abdominal pain       ED Disposition     ED Disposition   Admit    Condition   Stable    Date/Time   Sat David 3, 2023  2:11 AM    Comment   Case was discussed with Alfonso Felty* and the patient's admission status was agreed to be Admission Status: inpatient status to the service of Dr Danae Lomas*   Follow-up Information    None         Patient's Medications   Discharge Prescriptions    No medications on file       No discharge procedures on file      PDMP Review       Value Time User    PDMP Reviewed  Yes 5/31/2023 11:15 AM Laith MD [place]          ED Provider  Electronically Signed by           Chuy Carson DO  06/03/23 7423

## 2023-06-03 NOTE — H&P
[place]  H&P  Name: Marta Renee 57 y o  female I MRN: [id number]  Unit/Bed#: ED 04 I Date of Admission: 6/2/2023   Date of Service: 6/3/2023 I Hospital Day: 0      Assessment/Plan   * Asthma with COPD with exacerbation [facility])  Assessment & Plan  · Presents with acute worsening of dyspnea over the last 1 week, however states that her breathing has not been doing well over the last couple of months  · Home regimen: Bevespi twice daily -she is to use Pulmicort nebulizer twice daily, however her nebulizer machine broke so she has not done that for a while  · CT without focal consolidation, hold on further antibiotics for now  · Xopenex and Atrovent 3 times daily  · Pulmicort and Perforomist twice daily  · Solu-Medrol 40 Mg every 8 hours  · Pulmonology consult    Lactic acidosis  Assessment & Plan  · Lactic acidosis secondary to albuterol nebulizer  · Recheck lactic acid this morning    Hepatic steatosis  Assessment & Plan  · Noted on admission CT scan  · Patient without transaminitis and coags are normal, as well as total bilirubin, alk phos slightly elevated at 157  · Would benefit from GI referral on discharge    Type 2 diabetes mellitus with stage 2 chronic kidney disease, with long-term current use of insulin (HCC)  Assessment & Plan  Lab Results   Component Value Date    HGBA1C 9 9 (H) 05/11/2023       Recent Labs     06/03/23  0412   POCGLU 388*       Blood Sugar Average: Last 72 hrs:  (P) 388   · Home regimen: Lantus 30 units at bedtime, Humalog 24 units with breakfast and 12 units with lunch and dinner  · Continue home regimen slight decrease in mealtime insulin to 20 with breakfast and 10 at lunch and dinner due to carb controlled diet  · Of note patient did not take evening Lantus prior to coming to the hospital    Chronic respiratory failure with hypoxia and hypercapnia (HCC)  Assessment & Plan  · Wears 4 L supplemental oxygen via nasal cannula at baseline  · SPO2 stable on "home oxygen    Chronic diastolic congestive heart failure [facility])  Assessment & Plan  Wt Readings from Last 3 Encounters:   06/02/23 130 kg (286 lb)   05/17/23 130 kg (286 lb)   03/29/23 132 kg (290 lb)     · Last echo October 2022: LVEF 60%  G2 DD  No significant valvular disease  · Home regimen: Torsemide 60 Mg twice daily  · Patient given Lasix 40 in the ED, however does not examine fluid overloaded and CT does not show pulmonary congestion  · Resume home torsemide  · I/os Daily weights  · Sodium and fluid restriction    Paroxysmal atrial fibrillation (HCC)  Assessment & Plan  · Home regimen: Metoprolol 100 Mg daily and anticoagulation with Eliquis 5 mg twice daily  · Rate controlled and in sinus rhythm on admission  · Continue home regimen    Morbid obesity ([address] )  Assessment & Plan  · Affects all aspects of care, especially her dyspnea  · Dietary and lifestyle changes recommended    Obstructive sleep apnea  Assessment & Plan  · Utilizes CPAP at bedtime with AutoPap 12-20  · Continue       VTE Pharmacologic Prophylaxis: VTE Score: 6 High Risk (Score >/= 5) - Pharmacological DVT Prophylaxis Ordered: apixaban (Eliquis)  Sequential Compression Devices Ordered  Code Status: Level 1 - Full Code   Discussion with family: I offered to call patient's contact, however she declined  Anticipated Length of Stay: Patient will be admitted on an inpatient basis with an anticipated length of stay of greater than 2 midnights secondary to COPD exacerbation requiring IV steroids and scheduled nebulizers  Total Time Spent on Date of Encounter in care of patient: 75 minutes This time was spent on one or more of the following: performing physical exam; counseling and coordination of care; obtaining or reviewing history; documenting in the medical record; reviewing/ordering tests, medications or procedures; communicating with other healthcare professionals and discussing with patient's family/caregivers      Chief Complaint: \" " "I could not breathe\"    History of Present Illness:  Joselin Moreno is a 57 y o  female with a PMH of severe COPD with overlapping asthma, chronic respiratory failure on 4 L nasal cannula, paroxysmal A-fib, NIDDM 2 who presents with dyspnea that worsened in the last week  Patient does admit that her dyspnea has been worsening over the last few months but acutely worsened within the last week  She has had an occasional cough of white/clear phlegm that is unchanged from her baseline  No fevers or chills  She has noted some rhinorrhea  She reports ongoing diarrhea for multiple months  No focal abdominal pain, nausea, or vomiting  Does admit to occasional chest tightness with dyspnea  Reports that she is barely able to walk a few steps without feeling significantly dyspneic  She has not noticed any significant weight gain  On admission when asked about lower extremity swelling, she states that she noticed it upon asking  Review of Systems:  Review of Systems   Constitutional: Negative for chills and fever  HENT: Negative for congestion  Respiratory: Positive for cough, chest tightness and shortness of breath  Cardiovascular: Positive for leg swelling  Negative for chest pain  Gastrointestinal: Positive for diarrhea  Negative for abdominal pain, constipation, nausea and vomiting  Genitourinary: Negative for difficulty urinating, dysuria and hematuria  Musculoskeletal: Negative for gait problem  Neurological: Negative for weakness and numbness  All other systems reviewed and are negative        Past Medical and Surgical History:   Past Medical History:   Diagnosis Date   • Acute blood loss anemia 6/1/2021   • Acute diverticulitis 5/2/2021   • Alcohol abuse 5/21/2020   • Anemia    • Atrial fibrillation [facility])    • Cervical radiculopathy    • CHF (congestive heart failure) ([facility])    • Chronic low back pain    • Chronic obstructive asthma ([facility] [address] ) 2/20/2018   • Cigarette nicotine dependence without " complication     Quit 12/10/2019  • Community acquired pneumonia 2020   • Depression with anxiety 3/9/2014   • Diabetes mellitus with hyperglycemia ([facility] [address] )    • Diverticulitis 2021   • Elevated liver enzymes    • GERD (gastroesophageal reflux disease)    • Hypersomnolence 10/28/2020   • Hypokalemia 2018   • Paresthesia of upper extremity    • Plantar fasciitis    • Restless legs syndrome 2014   • Sexual dysfunction    • Sleep apnea, obstructive    • Tenosynovitis of finger    • Tinea corporis    • Tobacco use 2018    Currently smoking 3-4 cigarettes daily   • Trigger middle finger of left hand 2017   • Trigger ring finger of left hand 2017   • Weakness of upper extremity        Past Surgical History:   Procedure Laterality Date   • ABDOMINAL SURGERY     • CARPAL TUNNEL RELEASE Bilateral    •  SECTION     • CHOLECYSTECTOMY      laparoscopic   • ESOPHAGOGASTRODUODENOSCOPY N/A 12/3/2018    Procedure: ESOPHAGOGASTRODUODENOSCOPY (EGD) AND COLONOSCOPY;  Surgeon: Devyn Moreno MD;  Location: [facility];  Service: Gastroenterology   • GASTRIC BYPASS      for morbid obesity with gilda en y   • HERNIA REPAIR     • HYSTERECTOMY     • OH EXCISION GANGLION WRIST DORSAL/VOLAR PRIMARY Left 2017    Procedure: LONG FINGER GANGLION CYST EXCISION;  Surgeon: Roro Sotomayor MD;  [facility];  Service: Orthopedics   • OH TENDON SHEATH INCISION Left 2017    Procedure: Jose E Larve, RING, TRIGGER FINGER RELEASE;  Surgeon: Roro Sotomayor MD;  Location[facility];  Service: Orthopedics   • SHOULDER SURGERY Right        Meds/Allergies:  Prior to Admission medications    Medication Sig Start Date End Date Taking?  Authorizing Provider   albuterol (ProAir HFA) 90 mcg/act inhaler Inhale 2 puffs every 6 (six) hours as needed for wheezing or shortness of breath 22  Yes Gabriella España PA-C   albuterol (PROVENTIL HFA,VENTOLIN HFA) 90 mcg/act inhaler Inhale 2 puffs every 4 (four) hours as needed for wheezing 8/7/20  Yes Yomaira Conrad PA-C   atorvastatin (LIPITOR) 40 mg tablet Take 1 tablet (40 mg total) by mouth daily 3/29/23  Yes Janneth Perez MD   Blood Glucose Monitoring Suppl (Spokeable CONTOUR NEXT MONITOR) w/Device KIT Use daily 7/14/17  Yes Historical Provider, MD   Eliquis 5 MG TAKE 1 TABLET BY MOUTH TWICE A DAY 12/12/22  Yes Laith Olivares MD   escitalopram (LEXAPRO) 20 mg tablet TAKE 1 TABLET BY MOUTH EVERY DAY 3/2/23  Yes Joaquina Olivares MD   glycopyrrolate-formoterol (Zada Mis) 9-4 8 MCG/ACT inhaler Inhale 2 puffs 2 (two) times a day 11/15/22  Yes Milla Garsia PA-C   Insulin Glargine Solostar (Lantus SoloStar) 100 UNIT/ML SOPN Inject 0 3 mL (30 Units total) under the skin daily at bedtime 3/1/23  Yes Gretta Lincoln PA-C   insulin glulisine (Apidra SoloStar) 100 units/mL injection pen 24 UNITS BREAKFAST 12 UNITS AT LUNCH AND DINNER 4/12/23  Yes Gretta Lincoln PA-C   loratadine (CLARITIN) 10 mg tablet Take by mouth   Yes Historical Provider, MD   LORazepam (ATIVAN) 0 5 mg tablet TAKE 2 TABLETS BY MOUTH AT BEDTIME AND 1 EVERY 6 HOURS AS NEEDED FOR ANXIETY 5/31/23  Yes Laith Olivares MD   metFORMIN (GLUCOPHAGE-XR) 500 mg 24 hr tablet TAKE 2 TABLETS BY MOUTH TWICE A DAY WITH FOOD 3/2/23  Yes Laith Olivares MD   metoprolol succinate (TOPROL-XL) 100 mg 24 hr tablet Take 1 tablet (100 mg total) by mouth daily 3/29/23  Yes Janneth Perez MD   montelukast (SINGULAIR) 10 mg tablet TAKE 1 TABLET BY MOUTH DAILY AT BEDTIME 9/16/22  Yes Laith Olivares MD   omeprazole (PriLOSEC) 40 MG capsule Take 1 capsule (40 mg total) by mouth daily 6/22/22  Yes Laith Olivares MD   potassium chloride (Klor-Con M20) 20 mEq tablet Take 1 tablet (20 mEq total) by mouth 2 (two) times a day 3/29/23  Yes Janneth Perez MD   torsemide (DEMADEX) 20 mg tablet Take 3 tablets (60 mg total) by mouth 2 (two) times a day 2/27/23 6/3/23 Yes Laith Olivares MD   traZODone (DESYREL) 150 mg tablet Take 1 tablet (150 mg total) by mouth daily at bedtime 5/31/23  Yes Laith Olivares MD   albuterol (2 5 mg/3 mL) 0 083 % nebulizer solution Take 3 mL (2 5 mg total) by nebulization every 6 (six) hours as needed for wheezing or shortness of breath 11/5/21   Jorge Mayer PA-C   ascorbic acid (VITAMIN C) 1000 MG tablet Take 1,000 mg by mouth daily  Patient not taking: Reported on 3/29/2023    Historical Provider, MD   Benralizumab 30 MG/ML SOAJ Inject 1 mL under the skin every 28 days 12/21/22   Basia España PA-C   Benralizumab 30 MG/ML SOAJ Inject 30 mg under the skin every 56 days 1/18/23   VICKY Bernard   budesonide (PULMICORT) 0 5 mg/2 mL nebulizer solution TAKE 2 ML (0 5 MG TOTAL) BY NEBULIZATION TWICE A DAY RINSE MOUTH AFTER USE 5/16/23   Yovany Pastrana PA-C   Cholecalciferol 1000 units tablet Take 1,000 Units by mouth daily  Patient not taking: Reported on 2/8/2023    Historical Provider, MD   Contour Next Test test strip USE TO TEST BLOOD SUGAR 3 TIMES A DAY 2/14/23   Laith Olivares MD   [employer] Lancets Thin 26G MISC USE 3 (THREE) TIMES A DAY 5/4/22   Laith Olivares MD   cyanocobalamin (VITAMIN B-12) 100 mcg tablet Take by mouth daily  Patient not taking: Reported on 2/8/2023    Historical Provider, MD   EPINEPHrine (EPIPEN) 0 3 mg/0 3 mL SOAJ Inject 0 3 mL (0 3 mg total) into a muscle once for 1 dose 12/21/22 3/29/23  Erika Leopold Rhea, PA-C   EPINEPHrine (EPIPEN) 0 3 mg/0 3 mL SOAJ Inject 0 3 mL (0 3 mg total) into a muscle once for 1 dose 2/15/23 3/29/23  Jorge Mayer PA-C   ferrous sulfate 220 (44 Fe) mg/5 mL solution TAKE 5 ML (220 MG TOTAL) BY MOUTH 2 (TWO) TIMES A DAY BEFORE MEALS 2/1/22 6/3/23  Faustina Rodriguez MD   fluticasone [facility]) 50 mcg/act nasal spray 1 spray into each nostril 2 (two) times a day 9/23/20   Esther Hurtado MD   Insulin Pen Needle (BD Pen Needle Love 2nd Gen) 32G X 4 MM MISC Use daily at bedtime Use 4 new needles daily   2/3/22   Samy Champion MD   levalbuterol (XOPENEX) 1 25 mg/0 5 mL nebulizer solution Take 0 5 mL (1 25 mg total) by nebulization 2 (two) times a day 11/16/20   Rober Rutledge DO   metolazone (ZAROXOLYN) 5 mg tablet Take 1 tablet (5 mg total) by mouth daily  Patient not taking: Reported on 6/3/2023 10/18/21   Bhavesh Moya MD   naloxone [facility]) 4 mg/0 1 mL nasal spray Administer 1 spray into a nostril  If breathing does not return to normal or if breathing difficulty resumes after 2-3 minutes, give another dose in the other nostril using a new spray  7/29/20   Laith Olivares MD   semaglutide, 0 25 or 0 5 mg/dose, (Ozempic, 0 25 or 0 5 MG/DOSE,) 2 mg/3 mL injection pen Inject 0 75 mL (0 5 mg total) under the skin every 7 days 5/17/23   Shawn Bhakta PA-C     I have reviewed home medications with patient personally  Allergies: Allergies   Allergen Reactions   • Iron Dextran Swelling   • Januvia [Sitagliptin] Shortness Of Breath   • Jardiance [Empagliflozin] Shortness Of Breath   • Clonazepam Other (See Comments)     Unknown reaction   • Codeine Itching and Other (See Comments)     itch;mary kay morphine,takes ultram @home   • Adhesive [Medical Tape] Itching     itching   • Effexor [Venlafaxine] Itching   • Lactose - Food Allergy GI Intolerance   • Oxycodone-Acetaminophen Anxiety   • Oxycodone-Acetaminophen Itching and Rash     Other reaction(s):  Other (See Comments)  (PERCOCET) MILD RASH, not allergic to Acetaminophen        Social History:  Marital Status: Significant Other   Occupation: Not currently working  Patient Pre-hospital Living Situation: Home, Alone  Patient Pre-hospital Level of Mobility: walks  Patient Pre-hospital Diet Restrictions: Fluid and sodium restriction with carb conscious  Substance Use History:   Social History     Substance and Sexual Activity   Alcohol Use Not Currently   • Alcohol/week: 20 0 standard drinks of alcohol   • "Types: 20 Cans of beer per week    Comment: about 6 coors light every night, stopped in 2019     Social History     Tobacco Use   Smoking Status Former   • Packs/day: 0 25   • Years: 29 00   • Total pack years: 7 25   • Types: Cigarettes   • Start date: 200   • Quit date: 12/10/2019   • Years since quitting: 3 4   Smokeless Tobacco Never     Social History     Substance and Sexual Activity   Drug Use No       Family History:  Family History   Problem Relation Age of Onset   • Alzheimer's disease Mother    • Atrial fibrillation Mother    • Dementia Mother    • Heart disease Mother         heart problem   • Seizures Mother    • Parkinsonism Father    • Arthritis Father    • Atrial fibrillation Father    • No Known Problems Daughter    • Cri-du-chat syndrome Daughter    • Colon cancer Maternal Grandmother [de-identified]   • Diabetes type II Maternal Grandmother    • No Known Problems Brother    • No Known Problems Son    • Substance Abuse Neg Hx         mother,father   • Mental illness Neg Hx         mother,father   • Colon polyps Neg Hx        Physical Exam:     Vitals:   Blood Pressure: 128/63 (06/03/23 0520)  Pulse: 105 (06/03/23 0430)  Temperature: 97 8 °F (36 6 °C) (06/02/23 2236)  Temp Source: Temporal (06/02/23 2236)  Respirations: 13 (06/03/23 0430)  Height: 5' 3\" (160 cm) (06/02/23 2236)  Weight - Scale: 130 kg (286 lb) (06/02/23 2236)  SpO2: 92 % (06/03/23 0533)    Physical Exam  Vitals and nursing note reviewed  Constitutional:       General: She is not in acute distress  Appearance: Normal appearance  She is not ill-appearing  HENT:      Head: Normocephalic  Nose: Nose normal       Mouth/Throat:      Mouth: Mucous membranes are moist    Eyes:      Extraocular Movements: Extraocular movements intact  Conjunctiva/sclera: Conjunctivae normal    Cardiovascular:      Rate and Rhythm: Normal rate and regular rhythm  Pulses: Normal pulses  Heart sounds: No murmur heard    Pulmonary:      Effort: " Pulmonary effort is normal  No respiratory distress  Breath sounds: Normal breath sounds  No wheezing, rhonchi or rales  Comments: She occasionally has pursed lip breathing, however has no conversational dyspnea  Lungs are clear without wheezing  Abdominal:      Palpations: Abdomen is soft  Tenderness: There is no abdominal tenderness  Musculoskeletal:         General: Normal range of motion  Cervical back: Normal range of motion  Comments: Trace nonpitting edema to bilateral lower extremities   Skin:     General: Skin is warm and dry  Coloration: Skin is not pale  Neurological:      General: No focal deficit present  Mental Status: She is alert and oriented to person, place, and time  Psychiatric:         Mood and Affect: Mood normal          Thought Content:  Thought content normal           Additional Data:     Lab Results:  Results from last 7 days   Lab Units 06/03/23 0522 06/02/23  2318   EOS PCT %  --  0   HEMATOCRIT % 33 0* 35 3   HEMOGLOBIN g/dL 9 7* 10 1*   LYMPHS PCT %  --  15   MONOS PCT %  --  6   NEUTROS PCT %  --  78*   PLATELETS Thousands/uL 330 343   WBC Thousand/uL 16 61* 14 38*     Results from last 7 days   Lab Units 06/03/23 0522 06/02/23  2318   ANION GAP mmol/L 13 10   ALBUMIN g/dL  --  4 3   ALK PHOS U/L  --  157*   ALT U/L  --  37   AST U/L  --  24   BUN mg/dL 17 16   CALCIUM mg/dL 8 7 9 3   CHLORIDE mmol/L 92* 92*   CO2 mmol/L 32 35*   CREATININE mg/dL 0 77 0 76   GLUCOSE RANDOM mg/dL 391* 222*   POTASSIUM mmol/L 4 1 3 8   SODIUM mmol/L 137 137   TOTAL BILIRUBIN mg/dL  --  0 39     Results from last 7 days   Lab Units 06/02/23  2318   INR  1 09     Results from last 7 days   Lab Units 06/03/23  0412   POC GLUCOSE mg/dl 388*         Results from last 7 days   Lab Units 06/03/23  0522 06/03/23  0123 06/02/23  2318   LACTIC ACID mmol/L 4 0* 3 7* 2 7*   PROCALCITONIN ng/ml 0 06  --  0 05       Lines/Drains:  Invasive Devices     Peripheral Intravenous Line  Duration           Peripheral IV 06/02/23 Left Antecubital <1 day                    Imaging: Reviewed radiology reports from this admission including: chest CT scan and abdominal/pelvic CT  CT chest abdomen pelvis w contrast   Final Result by Santi Cheung DO (06/03 0147)      Heterogeneous hepatic attenuation, may be secondary to steatosis  If there is concern for hepatitis, correlate with liver function and hepatitis testing  Mosaic attenuation throughout the lungs suggesting air trapping/small airway disease  This is similar to multiple prior examinations  The study was marked in [facility] for immediate notification  Workstation performed: [id number]         XR chest portable   ED Interpretation by Zeyad Anthony DO (06/03 0022)   Bilateral hazy at bases possible pneumonia bilateral and/ or chf - similar to 2 previous xrays - interpreted by me          EKG and Other Studies Reviewed on Admission:   · EKG: NSR with normal QT     ** Please Note: This note has been constructed using a voice recognition system   **

## 2023-06-03 NOTE — ASSESSMENT & PLAN NOTE
· Home regimen: Metoprolol 100 Mg daily and anticoagulation with Eliquis 5 mg twice daily  · Rate controlled and in sinus rhythm on admission  · Continue home regimen

## 2023-06-03 NOTE — ASSESSMENT & PLAN NOTE
· Presents with acute worsening of dyspnea over the last 1 week, however states that her breathing has not been doing well over the last couple of months  · Home regimen: Bevespi twice daily -she is to use Pulmicort nebulizer twice daily, however her nebulizer machine broke so she has not done that for a while  · CT without focal consolidation, hold on further antibiotics for now  · Xopenex and Atrovent 3 times daily  · Pulmicort and Perforomist twice daily  · Solu-Medrol 40 Mg every 8 hours  · Pulmonology consult

## 2023-06-03 NOTE — ASSESSMENT & PLAN NOTE
Lab Results   Component Value Date    HGBA1C 9 9 (H) 05/11/2023       Recent Labs     06/04/23  0020 06/04/23  0157 06/04/23  0355 06/04/23  0612   POCGLU 218* 187* 157* 174*       Blood Sugar Average: Last 72 hrs:  (P) 286 4   · Home regimen: Lantus 30 units at bedtime, Humalog 24 units with breakfast and 12 units with lunch and dinner  · Transitioned to insulin gtt with 15mL/hr IVF do to hx of chf-> dc'd  · Initiated Lantus 50 U bid with humalog 15U tid ac algorithm 5 ISS  · ISS  · Hypoglycemic protocol

## 2023-06-03 NOTE — ASSESSMENT & PLAN NOTE
· Noted on admission CT scan  · Patient without transaminitis and coags are normal, as well as total bilirubin, alk phos slightly elevated at 157  · Would benefit from GI referral on discharge

## 2023-06-03 NOTE — ASSESSMENT & PLAN NOTE
· Lactic acidosis secondary to albuterol nebulizer  · lactic acid still elevated IVF not needed to clear

## 2023-06-03 NOTE — PLAN OF CARE
Problem: RESPIRATORY - ADULT  Goal: Achieves optimal ventilation and oxygenation  Description: INTERVENTIONS:  - Assess for changes in respiratory status  - Assess for changes in mentation and behavior  - Position to facilitate oxygenation and minimize respiratory effort  - Oxygen administered by appropriate delivery if ordered  - Initiate smoking cessation education as indicated  - Encourage broncho-pulmonary hygiene including cough, deep breathe, Incentive Spirometry  - Assess the need for suctioning and aspirate as needed  - Assess and instruct to report SOB or any respiratory difficulty  - Respiratory Therapy support as indicated  Outcome: Progressing     Problem: PAIN - ADULT  Goal: Verbalizes/displays adequate comfort level or baseline comfort level  Description: Interventions:  - Encourage patient to monitor pain and request assistance  - Assess pain using appropriate pain scale  - Administer analgesics based on type and severity of pain and evaluate response  - Implement non-pharmacological measures as appropriate and evaluate response  - Consider cultural and social influences on pain and pain management  - Notify physician/advanced practitioner if interventions unsuccessful or patient reports new pain  Outcome: Progressing     Problem: DISCHARGE PLANNING  Goal: Discharge to home or other facility with appropriate resources  Description: INTERVENTIONS:  - Identify barriers to discharge w/patient and caregiver  - Arrange for needed discharge resources and transportation as appropriate  - Identify discharge learning needs (meds, wound care, etc )  - Arrange for interpretive services to assist at discharge as needed  - Refer to Case Management Department for coordinating discharge planning if the patient needs post-hospital services based on physician/advanced practitioner order or complex needs related to functional status, cognitive ability, or social support system  Outcome: Progressing     Problem: Knowledge Deficit  Goal: Patient/family/caregiver demonstrates understanding of disease process, treatment plan, medications, and discharge instructions  Description: Complete learning assessment and assess knowledge base    Interventions:  - Provide teaching at level of understanding  - Provide teaching via preferred learning methods  Outcome: Progressing

## 2023-06-03 NOTE — ASSESSMENT & PLAN NOTE
Wt Readings from Last 3 Encounters:   06/02/23 130 kg (286 lb)   05/17/23 130 kg (286 lb)   03/29/23 132 kg (290 lb)     · Last echo October 2022: LVEF 60%  G2 DD  No significant valvular disease  · Home regimen:  Torsemide 60 Mg twice daily  · Patient given Lasix 40 in the ED, however does not examine fluid overloaded and CT does not show pulmonary congestion  · Continue home torsemide  · I/os Daily weights  · Sodium and fluid restriction

## 2023-06-03 NOTE — ED NOTES
Pt educated on importance of receiving insulin prior to eating   States understanding     Linnea Holliday, [id number] [facility]  06/03/23 0995

## 2023-06-04 LAB
ANION GAP SERPL CALCULATED.3IONS-SCNC: 8 MMOL/L (ref 4–13)
BUN SERPL-MCNC: 18 MG/DL (ref 5–25)
CALCIUM SERPL-MCNC: 8.8 MG/DL (ref 8.4–10.2)
CHLORIDE SERPL-SCNC: 95 MMOL/L (ref 96–108)
CO2 SERPL-SCNC: 36 MMOL/L (ref 21–32)
CREAT SERPL-MCNC: 0.63 MG/DL (ref 0.6–1.3)
ERYTHROCYTE [DISTWIDTH] IN BLOOD BY AUTOMATED COUNT: 18.2 % (ref 11.6–15.1)
GFR SERPL CREATININE-BSD FRML MDRD: 95 ML/MIN/1.73SQ M
GLUCOSE SERPL-MCNC: 157 MG/DL (ref 65–140)
GLUCOSE SERPL-MCNC: 159 MG/DL (ref 65–140)
GLUCOSE SERPL-MCNC: 174 MG/DL (ref 65–140)
GLUCOSE SERPL-MCNC: 187 MG/DL (ref 65–140)
GLUCOSE SERPL-MCNC: 214 MG/DL (ref 65–140)
GLUCOSE SERPL-MCNC: 218 MG/DL (ref 65–140)
GLUCOSE SERPL-MCNC: 257 MG/DL (ref 65–140)
GLUCOSE SERPL-MCNC: 269 MG/DL (ref 65–140)
GLUCOSE SERPL-MCNC: 310 MG/DL (ref 65–140)
GLUCOSE SERPL-MCNC: 318 MG/DL (ref 65–140)
GLUCOSE SERPL-MCNC: 332 MG/DL (ref 65–140)
GLUCOSE SERPL-MCNC: 337 MG/DL (ref 65–140)
GLUCOSE SERPL-MCNC: 346 MG/DL (ref 65–140)
GLUCOSE SERPL-MCNC: 366 MG/DL (ref 65–140)
GLUCOSE SERPL-MCNC: 421 MG/DL (ref 65–140)
GLUCOSE SERPL-MCNC: 449 MG/DL (ref 65–140)
HCT VFR BLD AUTO: 30.7 % (ref 34.8–46.1)
HGB BLD-MCNC: 9.2 G/DL (ref 11.5–15.4)
LACTATE SERPL-SCNC: 2.4 MMOL/L (ref 0.5–2)
MAGNESIUM SERPL-MCNC: 2.2 MG/DL (ref 1.9–2.7)
MCH RBC QN AUTO: 20.4 PG (ref 26.8–34.3)
MCHC RBC AUTO-ENTMCNC: 30 G/DL (ref 31.4–37.4)
MCV RBC AUTO: 68 FL (ref 82–98)
PLATELET # BLD AUTO: 351 THOUSANDS/UL (ref 149–390)
PMV BLD AUTO: 10.8 FL (ref 8.9–12.7)
POTASSIUM SERPL-SCNC: 3.7 MMOL/L (ref 3.5–5.3)
PROCALCITONIN SERPL-MCNC: 0.06 NG/ML
RBC # BLD AUTO: 4.51 MILLION/UL (ref 3.81–5.12)
SODIUM SERPL-SCNC: 139 MMOL/L (ref 135–147)
WBC # BLD AUTO: 17.27 THOUSAND/UL (ref 4.31–10.16)

## 2023-06-04 PROCEDURE — 80048 BASIC METABOLIC PNL TOTAL CA: CPT | Performed by: PHYSICIAN ASSISTANT

## 2023-06-04 PROCEDURE — 94660 CPAP INITIATION&MGMT: CPT

## 2023-06-04 PROCEDURE — 94640 AIRWAY INHALATION TREATMENT: CPT

## 2023-06-04 PROCEDURE — 82948 REAGENT STRIP/BLOOD GLUCOSE: CPT

## 2023-06-04 PROCEDURE — 99232 SBSQ HOSP IP/OBS MODERATE 35: CPT | Performed by: INTERNAL MEDICINE

## 2023-06-04 PROCEDURE — 84145 PROCALCITONIN (PCT): CPT | Performed by: PHYSICIAN ASSISTANT

## 2023-06-04 PROCEDURE — 94760 N-INVAS EAR/PLS OXIMETRY 1: CPT

## 2023-06-04 PROCEDURE — 83735 ASSAY OF MAGNESIUM: CPT | Performed by: PHYSICIAN ASSISTANT

## 2023-06-04 PROCEDURE — 99233 SBSQ HOSP IP/OBS HIGH 50: CPT | Performed by: STUDENT IN AN ORGANIZED HEALTH CARE EDUCATION/TRAINING PROGRAM

## 2023-06-04 PROCEDURE — 85027 COMPLETE CBC AUTOMATED: CPT | Performed by: PHYSICIAN ASSISTANT

## 2023-06-04 PROCEDURE — 83605 ASSAY OF LACTIC ACID: CPT | Performed by: PHYSICIAN ASSISTANT

## 2023-06-04 RX ORDER — INSULIN LISPRO 100 [IU]/ML
4-20 INJECTION, SOLUTION INTRAVENOUS; SUBCUTANEOUS
Status: DISCONTINUED | OUTPATIENT
Start: 2023-06-04 | End: 2023-06-04

## 2023-06-04 RX ORDER — SODIUM CHLORIDE 9 MG/ML
50 INJECTION, SOLUTION INTRAVENOUS CONTINUOUS
Status: DISCONTINUED | OUTPATIENT
Start: 2023-06-04 | End: 2023-06-06 | Stop reason: HOSPADM

## 2023-06-04 RX ORDER — INSULIN LISPRO 100 [IU]/ML
15 INJECTION, SOLUTION INTRAVENOUS; SUBCUTANEOUS
Status: DISCONTINUED | OUTPATIENT
Start: 2023-06-04 | End: 2023-06-04

## 2023-06-04 RX ORDER — INSULIN GLARGINE 100 [IU]/ML
50 INJECTION, SOLUTION SUBCUTANEOUS EVERY 12 HOURS SCHEDULED
Status: DISCONTINUED | OUTPATIENT
Start: 2023-06-04 | End: 2023-06-04

## 2023-06-04 RX ORDER — SACCHAROMYCES BOULARDII 250 MG
250 CAPSULE ORAL 2 TIMES DAILY
Status: DISCONTINUED | OUTPATIENT
Start: 2023-06-04 | End: 2023-06-06 | Stop reason: HOSPADM

## 2023-06-04 RX ADMIN — SODIUM CHLORIDE 16 UNITS/HR: 9 INJECTION, SOLUTION INTRAVENOUS at 20:41

## 2023-06-04 RX ADMIN — SODIUM CHLORIDE 15 UNITS/HR: 9 INJECTION, SOLUTION INTRAVENOUS at 14:04

## 2023-06-04 RX ADMIN — SODIUM CHLORIDE, PRESERVATIVE FREE 3 ML: 5 INJECTION INTRAVENOUS at 06:10

## 2023-06-04 RX ADMIN — APIXABAN 5 MG: 5 TABLET, FILM COATED ORAL at 09:13

## 2023-06-04 RX ADMIN — METHYLPREDNISOLONE SODIUM SUCCINATE 40 MG: 40 INJECTION, POWDER, FOR SOLUTION INTRAMUSCULAR; INTRAVENOUS at 09:14

## 2023-06-04 RX ADMIN — TRAZODONE HYDROCHLORIDE 150 MG: 150 TABLET ORAL at 21:41

## 2023-06-04 RX ADMIN — POTASSIUM CHLORIDE 20 MEQ: 1500 TABLET, EXTENDED RELEASE ORAL at 09:13

## 2023-06-04 RX ADMIN — FLUTICASONE PROPIONATE 1 SPRAY: 50 SPRAY, METERED NASAL at 21:40

## 2023-06-04 RX ADMIN — ESCITALOPRAM OXALATE 20 MG: 20 TABLET ORAL at 09:13

## 2023-06-04 RX ADMIN — LORATADINE 10 MG: 10 TABLET ORAL at 09:13

## 2023-06-04 RX ADMIN — FORMOTEROL FUMARATE DIHYDRATE 20 MCG: 20 SOLUTION RESPIRATORY (INHALATION) at 20:37

## 2023-06-04 RX ADMIN — METHYLPREDNISOLONE SODIUM SUCCINATE 40 MG: 40 INJECTION, POWDER, FOR SOLUTION INTRAMUSCULAR; INTRAVENOUS at 00:20

## 2023-06-04 RX ADMIN — LEVALBUTEROL HYDROCHLORIDE 1.25 MG: 1.25 SOLUTION RESPIRATORY (INHALATION) at 20:37

## 2023-06-04 RX ADMIN — Medication 250 MG: at 18:54

## 2023-06-04 RX ADMIN — PANTOPRAZOLE SODIUM 40 MG: 40 TABLET, DELAYED RELEASE ORAL at 06:10

## 2023-06-04 RX ADMIN — METHYLPREDNISOLONE SODIUM SUCCINATE 40 MG: 40 INJECTION, POWDER, FOR SOLUTION INTRAMUSCULAR; INTRAVENOUS at 18:00

## 2023-06-04 RX ADMIN — TORSEMIDE 60 MG: 20 TABLET ORAL at 17:21

## 2023-06-04 RX ADMIN — INSULIN LISPRO 16 UNITS: 100 INJECTION, SOLUTION INTRAVENOUS; SUBCUTANEOUS at 11:24

## 2023-06-04 RX ADMIN — FLUTICASONE PROPIONATE 1 SPRAY: 50 SPRAY, METERED NASAL at 09:16

## 2023-06-04 RX ADMIN — LEVALBUTEROL HYDROCHLORIDE 1.25 MG: 1.25 SOLUTION RESPIRATORY (INHALATION) at 13:43

## 2023-06-04 RX ADMIN — FORMOTEROL FUMARATE DIHYDRATE 20 MCG: 20 SOLUTION RESPIRATORY (INHALATION) at 07:35

## 2023-06-04 RX ADMIN — BUDESONIDE 0.5 MG: 0.5 INHALANT RESPIRATORY (INHALATION) at 07:35

## 2023-06-04 RX ADMIN — ATORVASTATIN CALCIUM 40 MG: 40 TABLET, FILM COATED ORAL at 17:21

## 2023-06-04 RX ADMIN — APIXABAN 5 MG: 5 TABLET, FILM COATED ORAL at 17:22

## 2023-06-04 RX ADMIN — METOPROLOL SUCCINATE 100 MG: 50 TABLET, EXTENDED RELEASE ORAL at 09:13

## 2023-06-04 RX ADMIN — SODIUM CHLORIDE 6 UNITS/HR: 9 INJECTION, SOLUTION INTRAVENOUS at 06:15

## 2023-06-04 RX ADMIN — LORAZEPAM 1 MG: 1 TABLET ORAL at 21:41

## 2023-06-04 RX ADMIN — SODIUM CHLORIDE, PRESERVATIVE FREE 3 ML: 5 INJECTION INTRAVENOUS at 14:05

## 2023-06-04 RX ADMIN — MONTELUKAST 10 MG: 10 TABLET, FILM COATED ORAL at 21:41

## 2023-06-04 RX ADMIN — TORSEMIDE 60 MG: 20 TABLET ORAL at 09:12

## 2023-06-04 RX ADMIN — IPRATROPIUM BROMIDE 0.5 MG: 0.5 SOLUTION RESPIRATORY (INHALATION) at 07:36

## 2023-06-04 RX ADMIN — IPRATROPIUM BROMIDE 0.5 MG: 0.5 SOLUTION RESPIRATORY (INHALATION) at 13:42

## 2023-06-04 RX ADMIN — SODIUM CHLORIDE 15 ML/HR: 0.9 INJECTION, SOLUTION INTRAVENOUS at 14:05

## 2023-06-04 RX ADMIN — INSULIN GLARGINE 50 UNITS: 100 INJECTION, SOLUTION SUBCUTANEOUS at 10:04

## 2023-06-04 RX ADMIN — AZITHROMYCIN MONOHYDRATE 500 MG: 500 INJECTION, POWDER, LYOPHILIZED, FOR SOLUTION INTRAVENOUS at 13:13

## 2023-06-04 RX ADMIN — Medication 250 MG: at 13:14

## 2023-06-04 RX ADMIN — POTASSIUM CHLORIDE 20 MEQ: 1500 TABLET, EXTENDED RELEASE ORAL at 17:21

## 2023-06-04 RX ADMIN — IPRATROPIUM BROMIDE 0.5 MG: 0.5 SOLUTION RESPIRATORY (INHALATION) at 20:37

## 2023-06-04 RX ADMIN — INSULIN LISPRO 15 UNITS: 100 INJECTION, SOLUTION INTRAVENOUS; SUBCUTANEOUS at 11:24

## 2023-06-04 RX ADMIN — BUDESONIDE 0.5 MG: 0.5 INHALANT RESPIRATORY (INHALATION) at 20:37

## 2023-06-04 RX ADMIN — LEVALBUTEROL HYDROCHLORIDE 1.25 MG: 1.25 SOLUTION RESPIRATORY (INHALATION) at 07:35

## 2023-06-04 NOTE — PLAN OF CARE
Problem: RESPIRATORY - ADULT  Goal: Achieves optimal ventilation and oxygenation  Description: INTERVENTIONS:  - Assess for changes in respiratory status  - Assess for changes in mentation and behavior  - Position to facilitate oxygenation and minimize respiratory effort  - Oxygen administered by appropriate delivery if ordered  - Initiate smoking cessation education as indicated  - Encourage broncho-pulmonary hygiene including cough, deep breathe, Incentive Spirometry  - Assess the need for suctioning and aspirate as needed  - Assess and instruct to report SOB or any respiratory difficulty  - Respiratory Therapy support as indicated  Outcome: Progressing     Problem: GASTROINTESTINAL - ADULT  Goal: Minimal or absence of nausea and/or vomiting  Description: INTERVENTIONS:  - Administer IV fluids if ordered to ensure adequate hydration  - Maintain NPO status until nausea and vomiting are resolved  - Nasogastric tube if ordered  - Administer ordered antiemetic medications as needed  - Provide nonpharmacologic comfort measures as appropriate  - Advance diet as tolerated, if ordered  - Consider nutrition services referral to assist patient with adequate nutrition and appropriate food choices  Outcome: Progressing  Goal: Maintains or returns to baseline bowel function  Description: INTERVENTIONS:  - Assess bowel function  - Encourage oral fluids to ensure adequate hydration  - Administer IV fluids if ordered to ensure adequate hydration  - Administer ordered medications as needed  - Encourage mobilization and activity  - Consider nutritional services referral to assist patient with adequate nutrition and appropriate food choices  Outcome: Progressing  Goal: Maintains adequate nutritional intake  Description: INTERVENTIONS:  - Monitor percentage of each meal consumed  - Identify factors contributing to decreased intake, treat as appropriate  - Assist with meals as needed  - Monitor I&O, weight, and lab values if indicated  - Obtain nutrition services referral as needed  Outcome: Progressing  Goal: Establish and maintain optimal ostomy function  Description: INTERVENTIONS:  - Assess bowel function  - Encourage oral fluids to ensure adequate hydration  - Administer IV fluids if ordered to ensure adequate hydration   - Administer ordered medications as needed  - Encourage mobilization and activity  - Nutrition services referral to assist patient with appropriate food choices  - Assess stoma site  - Consider wound care consult   Outcome: Progressing  Goal: Oral mucous membranes remain intact  Description: INTERVENTIONS  - Assess oral mucosa and hygiene practices  - Implement preventative oral hygiene regimen  - Implement oral medicated treatments as ordered  - Initiate Nutrition services referral as needed  Outcome: Progressing     Problem: PAIN - ADULT  Goal: Verbalizes/displays adequate comfort level or baseline comfort level  Description: Interventions:  - Encourage patient to monitor pain and request assistance  - Assess pain using appropriate pain scale  - Administer analgesics based on type and severity of pain and evaluate response  - Implement non-pharmacological measures as appropriate and evaluate response  - Consider cultural and social influences on pain and pain management  - Notify physician/advanced practitioner if interventions unsuccessful or patient reports new pain  Outcome: Progressing     Problem: INFECTION - ADULT  Goal: Absence or prevention of progression during hospitalization  Description: INTERVENTIONS:  - Assess and monitor for signs and symptoms of infection  - Monitor lab/diagnostic results  - Monitor all insertion sites, i e  indwelling lines, tubes, and drains  - Monitor endotracheal if appropriate and nasal secretions for changes in amount and color  - [place] appropriate cooling/warming therapies per order  - Administer medications as ordered  - Instruct and encourage patient and family to use good hand hygiene technique  - Identify and instruct in appropriate isolation precautions for identified infection/condition  Outcome: Progressing  Goal: Absence of fever/infection during neutropenic period  Description: INTERVENTIONS:  - Monitor WBC    Outcome: Progressing     Problem: SAFETY ADULT  Goal: Patient will remain free of falls  Description: INTERVENTIONS:  - Educate patient/family on patient safety including physical limitations  - Instruct patient to call for assistance with activity   - Consult OT/PT to assist with strengthening/mobility   - Keep Call bell within reach  - Keep bed low and locked with side rails adjusted as appropriate  - Keep care items and personal belongings within reach  - Initiate and maintain comfort rounds  - Make Fall Risk Sign visible to staff  - Apply yellow socks and bracelet for high fall risk patients  - Consider moving patient to room near nurses station  Outcome: Progressing  Goal: Maintain or return to baseline ADL function  Description: INTERVENTIONS:  -  Assess patient's ability to carry out ADLs; assess patient's baseline for ADL function and identify physical deficits which impact ability to perform ADLs (bathing, care of mouth/teeth, toileting, grooming, dressing, etc )  - Assess/evaluate cause of self-care deficits   - Assess range of motion  - Assess patient's mobility; develop plan if impaired  - Assess patient's need for assistive devices and provide as appropriate  - Encourage maximum independence but intervene and supervise when necessary  - Involve family in performance of ADLs  - Assess for home care needs following discharge   - Consider OT consult to assist with ADL evaluation and planning for discharge  - Provide patient education as appropriate  Outcome: Progressing  Goal: Maintains/Returns to pre admission functional level  Description: INTERVENTIONS:  - Perform BMAT or MOVE assessment daily    - Set and communicate daily mobility goal to care team and patient/family/caregiver  - Collaborate with rehabilitation services on mobility goals if consulted  - Out of bed for toileting  - Record patient progress and toleration of activity level   Outcome: Progressing     Problem: DISCHARGE PLANNING  Goal: Discharge to home or other facility with appropriate resources  Description: INTERVENTIONS:  - Identify barriers to discharge w/patient and caregiver  - Arrange for needed discharge resources and transportation as appropriate  - Identify discharge learning needs (meds, wound care, etc )  - Arrange for interpretive services to assist at discharge as needed  - Refer to Case Management Department for coordinating discharge planning if the patient needs post-hospital services based on physician/advanced practitioner order or complex needs related to functional status, cognitive ability, or social support system  Outcome: Progressing     Problem: Knowledge Deficit  Goal: Patient/family/caregiver demonstrates understanding of disease process, treatment plan, medications, and discharge instructions  Description: Complete learning assessment and assess knowledge base    Interventions:  - Provide teaching at level of understanding  - Provide teaching via preferred learning methods  Outcome: Progressing

## 2023-06-04 NOTE — PROGRESS NOTES
"Progress Note - Pulmonary   Mattie Villaltato Quant 57 y o  female MRN: [id number]  Unit/Bed#: -01 Encounter: [id number]    Assessment:  Acute on chronic hypoxic respiratory failure  Severe COPD with exacerbation  Restrictive lung disease due to morbid obesity and underlying WILMA  Lactic acidosis  Hyperglycemia with uncontrolled diabetes  Chronic diastolic heart failure with decompensation  Paroxysmal A-fib    Plan:  Inpatient continue all nebulized solutions with Perforomist Pulmicort Xopenex and Atrovent, continue IV steroids today 40 every 8 patient clinically improved from yesterday likely can decrease tomorrow  Patient responding favorably to diuresis continue to make patient negative while inpatient  Patient would benefit from a life 2000 portable ventilator for extra support with ambulation to help improve her quality of life we will attempt to get that for patient upon discharge coordinate with case management and respiratory    Chief Complaint:   I feel little better    Subjective:   Patient is visibly less dyspneic today and able to go to the bathroom without as much dyspnea no cough  Objective:     Vitals: Blood pressure 150/70, pulse 104, temperature 97 6 °F (36 4 °C), temperature source Temporal, resp  rate 18, height 5' 3\" (1 6 m), weight 130 kg (286 lb), SpO2 92 %, not currently breastfeeding  ,Body mass index is 50 66 kg/m²        Intake/Output Summary (Last 24 hours) at 6/4/2023 1208  Last data filed at 6/4/2023 1205  Gross per 24 hour   Intake 1182 04 ml   Output 3600 ml   Net -2417 96 ml       Invasive Devices     Peripheral Intravenous Line  Duration           Peripheral IV 06/02/23 Left Antecubital 1 day    Peripheral IV 06/03/23 Left;Ventral (anterior) Forearm <1 day                Physical Exam: BP 93/61   Pulse 90   Temp 97 9 °F (36 6 °C)   Resp 18   Ht 5' 3\" (1 6 m)   Wt 130 kg (286 lb)   SpO2 93%   BMI 50 66 kg/m²   General appearance: alert and oriented, in no acute distress  Neck: no " "adenopathy, no carotid bruit, no JVD, supple, symmetrical, trachea midline and thyroid not enlarged, symmetric, no tenderness/mass/nodules  Lungs: wheezes  Heart: regular rate and rhythm, S1, S2 normal, no murmur, click, rub or gallop  Abdomen: soft, non-tender; bowel sounds normal; no masses,  no organomegaly  Extremities: extremities normal, warm and well-perfused; no cyanosis, clubbing, or edema  Neurologic: Grossly normal     Labs:   BNP: No results found for: \"BNP\", CBC:   Lab Results   Component Value Date    HCT 30 7 (L) 06/04/2023    HGB 9 2 (L) 06/04/2023    MCH 20 4 (L) 06/04/2023    MCHC 30 0 (L) 06/04/2023    MCV 68 (L) 06/04/2023    MPV 10 8 06/04/2023    NRBC 0 06/03/2023     06/04/2023    RBC 4 51 06/04/2023    RDW 18 2 (H) 06/04/2023    WBC 17 27 (H) 06/04/2023   , CMP:   Lab Results   Component Value Date    BUN 18 06/04/2023    CALCIUM 8 8 06/04/2023    CL 95 (L) 06/04/2023    CO2 36 (H) 06/04/2023    CREATININE 0 63 06/04/2023    EGFR 95 06/04/2023    K 3 7 06/04/2023    SODIUM 139 06/04/2023     Imaging and other studies: I have personally reviewed pertinent films in PACS      "

## 2023-06-04 NOTE — MALNUTRITION/BMI
This medical record reflects one or more clinical indicators suggestive of malnutrition and/or morbid obesity  Malnutrition Findings:                                 BMI Findings:  Adult BMI Classifications: Morbid Obesity 50-59 9        Body mass index is 50 66 kg/m²  See Nutrition note dated 06/04/2023 for additional details  Completed nutrition assessment is viewable in the nutrition documentation

## 2023-06-04 NOTE — UTILIZATION REVIEW
Initial Clinical Review    Admission: Date/Time/Statement:   Admission Orders (From admission, onward)     Ordered        06/03/23 0211  INPATIENT ADMISSION  Once                      Orders Placed This Encounter   Procedures   • INPATIENT ADMISSION     Standing Status:   Standing     Number of Occurrences:   1     Order Specific Question:   Level of Care     Answer:   Med Surg [16]     Order Specific Question:   Estimated length of stay     Answer:   More than 2 Midnights     Order Specific Question:   Certification     Answer:   I certify that inpatient services are medically necessary for this patient for a duration of greater than two midnights  See H&P and MD Progress Notes for additional information about the patient's course of treatment  ED Arrival Information     Expected   -    Arrival   6/2/2023 22:05    Acuity   Emergent            Means of arrival   Walk-In    Escorted by   [facility]    Admission type   Emergency            Arrival complaint   SOB             Chief Complaint   Patient presents with   • Shortness of Breath     Pt states that she wear 4L NC all the time  Pt states that she started on Wednesday with an increase of SOB  Pt states that she is more SOB with activity  Pt states that she has an increase in leg swelling  Pt denies cough        Initial Presentation: 57 y o  female to ED via walk-in from home  Present to ED with dyspnea that worsened in the last week  Endorses occasional chest tightness with dyspnea  Reports that she is barely able to walk a few steps without feeling significantly dyspneic  PMHX severe COPD with overlapping asthma, chronic respiratory failure on 4 L nasal cannula, paroxysmal A-fib, NIDDM 2   Admitted to [facility] with DX: Asthma with COPD with exacerbation   on exam: tachy; tachpnea; hypertensive; pursed lip breathing; O2 @ 4L (baseline) sat 94% desated to 90 - inc  O2 to 5L via CPAP - sat 96%;  Lactic acidosis; Gluc 388  PLAN: cont DuoNebs; cont solumedrol iv; start insulin gtt; monitor labs; Accuchecks with ssic; continuous pulse ox; pulmonary consult; fluid restriction    PULMONARY CONSULT: Acute on chronic hypoxic respiratory failure / Probable hypercapnic respiratory failure / Severe COPD with exacerbation /   Restrictive lung disease due to morbid obesity and underlying WILMA / Lactic acidosis  Patient is currently on CPAP nightly will transition to BiPAP if hypercapnic  suffering from a severe exacerbation of her COPD   Plan: recheck lactic acid; check VBG's; cont solumedrol; cont duonebs    Date: 6/4/23   Day 2  still having conversational dyspnea  LA still elevated   Plan: cont DuoNebs; cont solumedrol iv; monitor labs;  Accuchecks with ssic; continuous pulse ox; pulmonary consult; fluid restriction      ED Triage Vitals   Temperature Pulse Respirations Blood Pressure SpO2   06/02/23 2236 06/02/23 2236 06/02/23 2236 06/02/23 2236 06/02/23 2236   97 8 °F (36 6 °C) 89 (!) 25 138/76 94 %      Temp Source Heart Rate Source Patient Position - Orthostatic VS BP Location FiO2 (%)   06/02/23 2236 06/02/23 2236 06/02/23 2336 06/03/23 0520 06/03/23 0533   Temporal Monitor Sitting Right arm 30      Pain Score       06/03/23 1700       No Pain          Wt Readings from Last 1 Encounters:   06/02/23 130 kg (286 lb)     Additional Vital Signs:   Date/Time Temp Pulse Resp BP MAP (mmHg) SpO2 FiO2 (%) Calculated FIO2 (%) - Nasal Cannula Nasal Cannula O2 Flow Rate (L/min) O2 Device O2 Interface Device Patient Position - Orthostatic VS   06/04/23 0755 -- -- -- -- -- 92 % -- -- -- -- -- --   06/04/23 0741 97 6 °F (36 4 °C) 104 18 150/70 101 90 % -- -- -- Nasal cannula -- Sitting   06/04/23 0736 -- -- -- -- -- 91 % -- 36 4 L/min Nasal cannula -- --   06/04/23 0419 -- -- -- -- -- 92 % -- -- -- -- Full face mask --   06/04/23 0246 98 6 °F (37 °C) 79 16 126/73 93 93 % -- 36 4 L/min CPAP -- Lying   06/04/23 0015 98 4 °F (36 9 °C) 86 16 118/56 79 93 % -- -- -- None (Room air) -- Lying   06/03/23 2141 -- -- -- -- -- 95 % -- -- -- -- Full face mask --   06/03/23 2100 -- 89 18 -- -- 88 % Abnormal  -- -- -- -- -- --   06/03/23 2059 -- -- -- -- -- 90 % -- -- -- -- -- --   06/03/23 2032 -- 86 18 -- -- 91 % -- 36 4 L/min None (Room air) -- --   06/03/23 1902 98 6 °F (37 °C) 89 18 126/60 87 93 % -- -- -- Nasal cannula -- Sitting   06/03/23 1710 98 2 °F (36 8 °C) -- -- -- -- -- -- -- -- -- -- --   06/03/23 15:36:28 98 6 °F (37 °C) 94 -- 133/68 90 91 % -- -- -- -- -- --   06/03/23 1350 -- -- -- -- -- 95 % 30 -- -- CPAP Full face mask --   06/03/23 12:38:21 100 3 °F (37 9 °C) 89 -- 132/68 89 90 % -- -- -- -- -- --   06/03/23 1045 -- 90 18 134/70 -- 95 % -- 36 4 L/min Nasal cannula -- --   06/03/23 0946 -- 104 -- 134/70 -- 95 % -- -- -- None (Room air) -- --   06/03/23 0915 -- 106 Abnormal  -- -- -- 96 % -- 40 5 L/min Nasal cannula -- --   06/03/23 0715 -- 99 -- -- -- 96 % -- -- -- -- -- --   06/03/23 0533 -- -- -- -- -- 92 % 30 -- -- CPAP Full face mask --   06/03/23 0520 -- -- -- 128/63 88 -- -- -- -- -- -- Sitting   06/03/23 0430 -- 105 13 120/58 83 90 % -- 36 4 L/min Nasal cannula -- --   06/03/23 0400 -- 104  -- 115/56  81  90 %  -- 36 4 L/min Nasal cannula -- --   Pulse: Simultaneous filing  User may not have seen previous data  at 06/03/23 0400   BP: Simultaneous filing  User may not have seen previous data  at 06/03/23 0400   MAP (mmHg): Simultaneous filing  User may not have seen previous data  at 06/03/23 0400   SpO2: Simultaneous filing  User may not have seen previous data   at 06/03/23 0400   06/03/23 0300 -- 101 22 153/71 102 92 % -- -- -- Nasal cannula -- --   06/03/23 0030 -- 98 22 174/77 Abnormal  110 95 % -- -- -- Nasal cannula -- --   06/02/23 2336 -- 90 20 148/73 103 94 % -- 36 4 L/min Nasal cannula -- Sitting   06/02/23 2304 -- -- -- -- -- -- -- -- -- None (Room air) -- --   06/02/23 2236 97 8 °F (36 6 °C) 89 25 Abnormal  138/76 -- 94 % -- 36 4 L/min Nasal cannula -- -- EKG: Normal sinus rhythm  Low voltage QRS  Borderline ECG  When compared with ECG of 30-SEP-2021 06:59,  No significant change was found    Pertinent Labs/Diagnostic Test Results:   CT chest abdomen pelvis w contrast   Final Result by Bela Roque DO (06/03 0147)      Heterogeneous hepatic attenuation, may be secondary to steatosis  If there is concern for hepatitis, correlate with liver function and hepatitis testing  Mosaic attenuation throughout the lungs suggesting air trapping/small airway disease  This is similar to multiple prior examinations  The study was marked in [facility] for immediate notification  Workstation performed: [id number]         XR chest portable   ED Interpretation by Lori Dhaliwal DO (06/03 0022)   Bilateral hazy at bases possible pneumonia bilateral and/ or chf - similar to 2 previous xrays - interpreted by me      Final Result by Martita Alves MD (06/03 1218)      No acute cardiopulmonary disease                 Workstation performed: [id number]           Results from last 7 days   Lab Units 06/02/23 2318   SARS-COV-2  Negative     Results from last 7 days   Lab Units 06/04/23  0358 06/03/23  1517 06/03/23  0522 06/02/23  2318   HEMATOCRIT % 30 7* 33 7* 33 0* 35 3   HEMOGLOBIN g/dL 9 2* 9 7* 9 7* 10 1*   NEUTROS ABS Thousands/µL  --  13 64*  --  11 28*   PLATELETS Thousands/uL 351 390 330 343   WBC Thousand/uL 17 27* 15 73* 16 61* 14 38*         Results from last 7 days   Lab Units 06/04/23  0358 06/03/23  1517 06/03/23  0522 06/02/23  2318   ANION GAP mmol/L 8 12 13 10   BUN mg/dL 18 18 17 16   CALCIUM mg/dL 8 8 9 3 8 7 9 3   CHLORIDE mmol/L 95* 89* 92* 92*   CO2 mmol/L 36* 31 32 35*   CREATININE mg/dL 0 63 0 85 0 77 0 76   EGFR ml/min/1 73sq m 95 72 81 83   POTASSIUM mmol/L 3 7 4 5 4 1 3 8   MAGNESIUM mg/dL 2 2  --   --   --    SODIUM mmol/L 139 132* 137 137     Results from last 7 days   Lab Units 06/02/23  2318   ALBUMIN g/dL 4 3   ALK PHOS U/L 157* ALT U/L 37   AST U/L 24   TOTAL BILIRUBIN mg/dL 0 39   TOTAL PROTEIN g/dL 7 6     Results from last 7 days   Lab Units 06/04/23  1110 06/04/23  0958 06/04/23  0818 06/04/23  0612 06/04/23  0355 06/04/23  0157 06/04/23  0020 06/03/23  2156 06/03/23  1950 06/03/23  1703 06/03/23  1615 06/03/23  1436   POC GLUCOSE mg/dl 346* 337* 214* 174* 157* 187* 218* 283* 255* 317* 387* >500*     Results from last 7 days   Lab Units 06/04/23  0358 06/03/23  1517 06/03/23  1449 06/03/23  0522 06/02/23  2318   GLUCOSE RANDOM mg/dL 159* 502* 528* 391* 222*       BETA-HYDROXYBUTYRATE   Date Value Ref Range Status   06/03/2023 0 2 <0 6 mmol/L Final          Results from last 7 days   Lab Units 06/03/23  1517   BASE EXC DARVIN mmol/L 5 0   HCO3 DARVIN mmol/L 30 3*   O2 CONTENT DARVIN ml/dL 12 2   O2 HGB, VENOUS % 82 5*   PCO2 DARVIN mm Hg 48 2   PH DARVIN  7 416*   PO2 DARVIN mm Hg 51 3*       Results from last 7 days   Lab Units 06/03/23  0346 06/03/23  0031 06/02/23  2318   HS TNI 0HR ng/L  --   --  5   HS TNI 2HR ng/L  --  6  --    HS TNI 4HR ng/L 4  --   --    HSTNI D2 ng/L  --  1  --    HSTNI D4 ng/L -1  --   --      Results from last 7 days   Lab Units 06/02/23  2318   D-DIMER QUANTITATIVE ug/ml FEU <0 27     Results from last 7 days   Lab Units 06/02/23  2318   INR  1 09   PROTIME seconds 14 9*   PTT seconds 32         Results from last 7 days   Lab Units 06/04/23  0358 06/03/23  0522 06/02/23  2318   PROCALCITONIN ng/ml 0 06 0 06 0 05     Results from last 7 days   Lab Units 06/04/23  0358 06/03/23  1716 06/03/23  1517 06/03/23  0643 06/03/23  0522 06/03/23  0123 06/02/23  2318   LACTIC ACID mmol/L 2 4* 4 3* 4 7* 2 9* 4 0* 3 7* 2 7*       Results from last 7 days   Lab Units 06/02/23  2318   BNP pg/mL 105*       Results from last 7 days   Lab Units 06/03/23  0038   BILIRUBIN UA  Negative   BLOOD UA  Negative   CLARITY UA  Clear   COLOR UA  Light Yellow   GLUCOSE UA mg/dl Negative   KETONES UA mg/dl Negative   LEUKOCYTES UA  Negative   NITRITE UA Negative   PH UA  5 5   PROTEIN UA mg/dl Negative   SPEC GRAV UA  1 010   UROBILINOGEN UA (BE) mg/dl <2 0     Results from last 7 days   Lab Units 06/02/23  2318   INFLUENZA A PCR  Negative   INFLUENZA B PCR  Negative   RSV PCR  Negative       Results from last 7 days   Lab Units 06/03/23  0031 06/02/23  2318   BLOOD CULTURE  No Growth at 24 hrs  No Growth at 24 hrs         ED Treatment:   Medication Administration from 06/02/2023 2205 to 06/03/2023 1203       Date/Time Order Dose Route Action     06/02/2023 2328 EDT methylPREDNISolone sodium succinate (Solu-MEDROL) injection 100 mg 100 mg Intravenous Given     06/02/2023 2336 EDT albuterol inhalation solution 10 mg 10 mg Nebulization Given     06/02/2023 2336 EDT ipratropium (ATROVENT) 0 02 % inhalation solution 0 5 mg 0 5 mg Nebulization Given     06/02/2023 2336 EDT sodium chloride 0 9 % inhalation solution 3 mL 3 mL Nebulization Given     06/03/2023 0041 EDT furosemide (LASIX) injection 40 mg 40 mg Intravenous Given     06/03/2023 0119 EDT cefTRIAXone (ROCEPHIN) IVPB (premix in dextrose) 1,000 mg 50 mL 1,000 mg Intravenous New Bag     06/03/2023 0234 EDT azithromycin (ZITHROMAX) 500 mg in sodium chloride 0 9% 250mL IVPB 500 mg 500 mg Intravenous New Bag     06/03/2023 0118 EDT iohexol (OMNIPAQUE) 350 MG/ML injection (SINGLE-DOSE) 100 mL 100 mL Intravenous Given     06/03/2023 0240 EDT sodium chloride 0 9 % bolus 250 mL 250 mL Intravenous New Bag     06/03/2023 0946 EDT apixaban (ELIQUIS) tablet 5 mg 5 mg Oral Given     06/03/2023 0946 EDT escitalopram (LEXAPRO) tablet 20 mg 20 mg Oral Given     06/03/2023 0946 EDT loratadine (CLARITIN) tablet 10 mg 10 mg Oral Given     06/03/2023 0946 EDT metoprolol succinate (TOPROL-XL) 24 hr tablet 100 mg 100 mg Oral Given     06/03/2023 0641 EDT pantoprazole (PROTONIX) EC tablet 40 mg 40 mg Oral Given     06/03/2023 0946 EDT potassium chloride (K-DUR,KLOR-CON) CR tablet 20 mEq 20 mEq Oral Given     06/03/2023 0946 EDT torsemide (DEMADEX) tablet 60 mg 60 mg Oral Given     06/03/2023 0933 EDT levalbuterol (XOPENEX) inhalation solution 1 25 mg 1 25 mg Nebulization Given     06/03/2023 0933 EDT ipratropium (ATROVENT) 0 02 % inhalation solution 0 5 mg 0 5 mg Nebulization Given     06/03/2023 0933 EDT budesonide (PULMICORT) inhalation solution 0 5 mg 0 5 mg Nebulization Given     06/03/2023 0945 EDT methylPREDNISolone sodium succinate (Solu-MEDROL) injection 40 mg 40 mg Intravenous Given     06/03/2023 0941 EDT insulin lispro (HumaLOG) 100 units/mL subcutaneous injection 20 Units 20 Units Subcutaneous Given     06/03/2023 0939 EDT insulin lispro (HumaLOG) 100 units/mL subcutaneous injection 1-6 Units 6 Units Subcutaneous Given     06/03/2023 0422 EDT insulin lispro (HumaLOG) 100 units/mL subcutaneous injection 1-5 Units 4 Units Subcutaneous Given          Present on Admission:  • Asthma with COPD with exacerbation ([facility] )  • Chronic diastolic congestive heart failure (HCC)  • Chronic respiratory failure with hypoxia and hypercapnia (HCC)  • Paroxysmal atrial fibrillation (HCC)  • Obstructive sleep apnea  • Morbid obesity ([facility] )      Admitting Diagnosis: CHF (congestive heart failure) (HCC) [I50 9]  Pneumonia [J18 9]  Respiratory distress [R06 03]  SOB (shortness of breath) [R06 02]  Abdominal pain [R10 9]  Asthma with COPD with exacerbation ([place] [address] ) [J44 1, O24 908]     Age/Sex: 57 y o  female     Admission Orders: SCDs; I/O; Consistent Carbohydrate Diet  Accu-checks ACHS   Fluid restriction 1800    Scheduled Medications:  apixaban, 5 mg, Oral, BID  atorvastatin, 40 mg, Oral, After Dinner  budesonide, 0 5 mg, Nebulization, Q12H  escitalopram, 20 mg, Oral, Daily  fluticasone, 1 spray, Nasal, BID  formoterol, 20 mcg, Nebulization, Q12H  insulin glargine, 50 Units, Subcutaneous, Q12H LISETTE  insulin lispro, 15 Units, Subcutaneous, TID With Meals  insulin lispro, 4-20 Units, Subcutaneous, TID AC  insulin lispro, 4-20 Units, Subcutaneous, HS  ipratropium, 0 5 mg, Nebulization, TID  levalbuterol, 1 25 mg, Nebulization, TID  loratadine, 10 mg, Oral, Daily  LORazepam, 1 mg, Oral, HS  methylPREDNISolone sodium succinate, 40 mg, Intravenous, Q8H  metoprolol succinate, 100 mg, Oral, Daily  montelukast, 10 mg, Oral, HS  pantoprazole, 40 mg, Oral, Daily Before Breakfast  potassium chloride, 20 mEq, Oral, BID  sodium chloride (PF), 3 mL, Intravenous, Q8H LISETTE  torsemide, 60 mg, Oral, BID (diuretic)  traZODone, 150 mg, Oral, HS      Continuous IV Infusions: insulin regular (HumuLIN R,NovoLIN R) 1 Units/mL in sodium chloride 0 9 % 100 mL infusion       PRN Meds:  acetaminophen, 650 mg, Oral, Q6H PRN  aluminum-magnesium hydroxide-simethicone, 30 mL, Oral, Q6H PRN  LORazepam, 0 5 mg, Oral, Q6H PRN  senna, 1 tablet, Oral, HS PRN        IP CONSULT TO PULMONOLOGY    Network Utilization Review Department  ATTENTION: Please call with any questions or concerns to [phone] and carefully listen to the prompts so that you are directed to the right person  All voicemails are confidential   [facility] all requests for admission clinical reviews, approved or denied determinations and any other requests to dedicated fax number below belonging to the campus where the patient is receiving treatment   List of dedicated fax numbers for the Facilities:  [address] DENIALS (Administrative/Medical Necessity) [phone]   [address] [place] [address] (Maternity/NICU/Pediatrics) [phone]   [address] [phone]   [place] [address] [phone]   [address] [place] [address] [facility] [place] [place] [id number] [place] [address] [address]   [address] [facility] [address] [place] [address] [facility] [phone]

## 2023-06-04 NOTE — NUTRITION
06/04/23 1123   PES Statement   Problem Clinical   Weight (3) Overweight/obesity NC-3 3   Overweight/ obesity specific to Obese, Class III (5)   Related to Energy intake>energy output over time   As evidenced by: BMI   Additional PES needed? Yes   PES Statement 2   Problem Clinical   As evidenced by Abnormal lab (Specify)  (A1C, BS, alk phos, ALT)   Related to Catabolic illness  (DM2, hepatic steatosis)   Biochemical (2) Altered nutrition-related laboratory values (specify) NC-2 2  (BS, A1C, alk phos, ALT)   Recommendations/Interventions   Malnutrition/BMI Present Yes   Provider already documenting morbid obesity Yes   Adult BMI Classifications Morbid Obesity 50-59 9   Summary Pt with excellent po intake/meal completion/appeitite  Discussed with pt the importance of a low carb diet, not only to begin to control BS as a person with DM2, but allay the progression of hepatic steatosis  Recommended to pt to focus on 30g carbohydrate at each meal and increase lean protein and increase polyunsaturate/monounsaturated fats ( especially in form of fish)  when returning to home   ( recommendations for fatty liver is 35%-45% fat of which > 50% is MUFA's, 35%-40% carbs and 15%-20% protein)  Provided AND handout regarding diet and CCD as refresher  Will continue to monitor pt for po intake/wts  Will adjust diet to CCD1 ( 60g carb)  Will add double protein to order if pt has c/o not enough food or finds herself hungry between meals     Interventions/Recommendations Adjust diet order   Recommendations to Provider Obtain liver enzymes lab work   Education Assessment   Education Education initiated/ completed   Education Notes CCD and diet for fatty liver   Patient Nutrition Goals   Goal Comprehend education;Meet PO needs   Goal Status Initiated   Timeframe to complete goal by next f/u   Nutrition Complexity Risk   Nutrition complexity level Moderate risk   Follow up date 06/09/23

## 2023-06-04 NOTE — PLAN OF CARE
Problem: RESPIRATORY - ADULT  Goal: Achieves optimal ventilation and oxygenation  Description: INTERVENTIONS:  - Assess for changes in respiratory status  - Assess for changes in mentation and behavior  - Position to facilitate oxygenation and minimize respiratory effort  - Oxygen administered by appropriate delivery if ordered  - Initiate smoking cessation education as indicated  - Encourage broncho-pulmonary hygiene including cough, deep breathe, Incentive Spirometry  - Assess the need for suctioning and aspirate as needed  - Assess and instruct to report SOB or any respiratory difficulty  - Respiratory Therapy support as indicated  6/3/2023 2042 by Franklyn Iniguez RN  Outcome: Progressing  6/3/2023 2041 by Franklyn Iniguez RN  Outcome: Progressing     Problem: GASTROINTESTINAL - ADULT  Goal: Minimal or absence of nausea and/or vomiting  Description: INTERVENTIONS:  - Administer IV fluids if ordered to ensure adequate hydration  - Maintain NPO status until nausea and vomiting are resolved  - Nasogastric tube if ordered  - Administer ordered antiemetic medications as needed  - Provide nonpharmacologic comfort measures as appropriate  - Advance diet as tolerated, if ordered  - Consider nutrition services referral to assist patient with adequate nutrition and appropriate food choices  6/3/2023 2042 by Franklyn Iniguez RN  Outcome: Progressing  6/3/2023 2041 by Franklyn Iniguez RN  Outcome: Progressing  Goal: Maintains or returns to baseline bowel function  Description: INTERVENTIONS:  - Assess bowel function  - Encourage oral fluids to ensure adequate hydration  - Administer IV fluids if ordered to ensure adequate hydration  - Administer ordered medications as needed  - Encourage mobilization and activity  - Consider nutritional services referral to assist patient with adequate nutrition and appropriate food choices  6/3/2023 2042 by Franklyn Iniguez RN  Outcome: Progressing  6/3/2023 2041 by Franklyn Iniguez RN  Outcome: Progressing  Goal: Maintains adequate nutritional intake  Description: INTERVENTIONS:  - Monitor percentage of each meal consumed  - Identify factors contributing to decreased intake, treat as appropriate  - Assist with meals as needed  - Monitor I&O, weight, and lab values if indicated  - Obtain nutrition services referral as needed  6/3/2023 2042 by Yulissa Yang RN  Outcome: Progressing  6/3/2023 2041 by Yulissa Yang RN  Outcome: Progressing  Goal: Establish and maintain optimal ostomy function  Description: INTERVENTIONS:  - Assess bowel function  - Encourage oral fluids to ensure adequate hydration  - Administer IV fluids if ordered to ensure adequate hydration   - Administer ordered medications as needed  - Encourage mobilization and activity  - Nutrition services referral to assist patient with appropriate food choices  - Assess stoma site  - Consider wound care consult   6/3/2023 2042 by Yulissa Yang RN  Outcome: Progressing  6/3/2023 2041 by Yulissa Yang RN  Outcome: Progressing  Goal: Oral mucous membranes remain intact  Description: INTERVENTIONS  - Assess oral mucosa and hygiene practices  - Implement preventative oral hygiene regimen  - Implement oral medicated treatments as ordered  - Initiate Nutrition services referral as needed  6/3/2023 2042 by Yulissa Yang RN  Outcome: Progressing  6/3/2023 2041 by Yulissa Yang RN  Outcome: Progressing     Problem: PAIN - ADULT  Goal: Verbalizes/displays adequate comfort level or baseline comfort level  Description: Interventions:  - Encourage patient to monitor pain and request assistance  - Assess pain using appropriate pain scale  - Administer analgesics based on type and severity of pain and evaluate response  - Implement non-pharmacological measures as appropriate and evaluate response  - Consider cultural and social influences on pain and pain management  - Notify physician/advanced practitioner if interventions unsuccessful or patient reports new pain  6/3/2023 2042 by Red Robbins RN  Outcome: Progressing  6/3/2023 2041 by Red Robbins RN  Outcome: Progressing     Problem: INFECTION - ADULT  Goal: Absence or prevention of progression during hospitalization  Description: INTERVENTIONS:  - Assess and monitor for signs and symptoms of infection  - Monitor lab/diagnostic results  - Monitor all insertion sites, i e  indwelling lines, tubes, and drains  - Monitor endotracheal if appropriate and nasal secretions for changes in amount and color  - [place] appropriate cooling/warming therapies per order  - Administer medications as ordered  - Instruct and encourage patient and family to use good hand hygiene technique  - Identify and instruct in appropriate isolation precautions for identified infection/condition  6/3/2023 2042 by Red Robbins RN  Outcome: Progressing  6/3/2023 2041 by Red Robbins RN  Outcome: Progressing  Goal: Absence of fever/infection during neutropenic period  Description: INTERVENTIONS:  - Monitor WBC    6/3/2023 2042 by Red Robbins RN  Outcome: Progressing  6/3/2023 2041 by Red Robbins RN  Outcome: Progressing     Problem: SAFETY ADULT  Goal: Patient will remain free of falls  Description: INTERVENTIONS:  - Educate patient/family on patient safety including physical limitations  - Instruct patient to call for assistance with activity   - Consult OT/PT to assist with strengthening/mobility   - Keep Call bell within reach  - Keep bed low and locked with side rails adjusted as appropriate  - Keep care items and personal belongings within reach  - Initiate and maintain comfort rounds  - Make Fall Risk Sign visible to staff  - Apply yellow socks and bracelet for high fall risk patients  - Consider moving patient to room near nurses station  6/3/2023 2042 by Red Robbins RN  Outcome: Progressing  6/3/2023 2041 by Red Robbins RN  Outcome: Progressing  Goal: Maintain or return to baseline ADL function  Description: INTERVENTIONS:  -  Assess patient's ability to carry out ADLs; assess patient's baseline for ADL function and identify physical deficits which impact ability to perform ADLs (bathing, care of mouth/teeth, toileting, grooming, dressing, etc )  - Assess/evaluate cause of self-care deficits   - Assess range of motion  - Assess patient's mobility; develop plan if impaired  - Assess patient's need for assistive devices and provide as appropriate  - Encourage maximum independence but intervene and supervise when necessary  - Involve family in performance of ADLs  - Assess for home care needs following discharge   - Consider OT consult to assist with ADL evaluation and planning for discharge  - Provide patient education as appropriate  6/3/2023 2042 by Rosemarie Lewis RN  Outcome: Progressing  6/3/2023 2041 by Rosemarie Lewis RN  Outcome: Progressing  Goal: Maintains/Returns to pre admission functional level  Description: INTERVENTIONS:  - Perform BMAT or MOVE assessment daily    - Set and communicate daily mobility goal to care team and patient/family/caregiver     - Collaborate with rehabilitation services on mobility goals if consulted  - Out of bed for toileting  - Record patient progress and toleration of activity level   6/3/2023 2042 by Rosemarie Lewis RN  Outcome: Progressing  6/3/2023 2041 by Rosemarie Lewis RN  Outcome: Progressing     Problem: DISCHARGE PLANNING  Goal: Discharge to home or other facility with appropriate resources  Description: INTERVENTIONS:  - Identify barriers to discharge w/patient and caregiver  - Arrange for needed discharge resources and transportation as appropriate  - Identify discharge learning needs (meds, wound care, etc )  - Arrange for interpretive services to assist at discharge as needed  - Refer to Case Management Department for coordinating discharge planning if the patient needs post-hospital services based on physician/advanced practitioner order or complex needs related to functional status, cognitive ability, or social support system  6/3/2023 2042 by Prabha No RN  Outcome: Progressing  6/3/2023 2041 by Prabha No RN  Outcome: Progressing     Problem: Knowledge Deficit  Goal: Patient/family/caregiver demonstrates understanding of disease process, treatment plan, medications, and discharge instructions  Description: Complete learning assessment and assess knowledge base    Interventions:  - Provide teaching at level of understanding  - Provide teaching via preferred learning methods  6/3/2023 2042 by Prabha No RN  Outcome: Progressing  6/3/2023 2041 by Prabha No RN  Outcome: Progressing

## 2023-06-04 NOTE — PLAN OF CARE
Problem: RESPIRATORY - ADULT  Goal: Achieves optimal ventilation and oxygenation  Description: INTERVENTIONS:  - Assess for changes in respiratory status  - Assess for changes in mentation and behavior  - Position to facilitate oxygenation and minimize respiratory effort  - Oxygen administered by appropriate delivery if ordered  - Initiate smoking cessation education as indicated  - Encourage broncho-pulmonary hygiene including cough, deep breathe, Incentive Spirometry  - Assess the need for suctioning and aspirate as needed  - Assess and instruct to report SOB or any respiratory difficulty  - Respiratory Therapy support as indicated  Outcome: Progressing     Problem: GASTROINTESTINAL - ADULT  Goal: Minimal or absence of nausea and/or vomiting  Description: INTERVENTIONS:  - Administer IV fluids if ordered to ensure adequate hydration  - Maintain NPO status until nausea and vomiting are resolved  - Nasogastric tube if ordered  - Administer ordered antiemetic medications as needed  - Provide nonpharmacologic comfort measures as appropriate  - Advance diet as tolerated, if ordered  - Consider nutrition services referral to assist patient with adequate nutrition and appropriate food choices  Outcome: Progressing  Goal: Maintains or returns to baseline bowel function  Description: INTERVENTIONS:  - Assess bowel function  - Encourage oral fluids to ensure adequate hydration  - Administer IV fluids if ordered to ensure adequate hydration  - Administer ordered medications as needed  - Encourage mobilization and activity  - Consider nutritional services referral to assist patient with adequate nutrition and appropriate food choices  Outcome: Progressing  Goal: Maintains adequate nutritional intake  Description: INTERVENTIONS:  - Monitor percentage of each meal consumed  - Identify factors contributing to decreased intake, treat as appropriate  - Assist with meals as needed  - Monitor I&O, weight, and lab values if indicated  - Obtain nutrition services referral as needed  Outcome: Progressing  Goal: Establish and maintain optimal ostomy function  Description: INTERVENTIONS:  - Assess bowel function  - Encourage oral fluids to ensure adequate hydration  - Administer IV fluids if ordered to ensure adequate hydration   - Administer ordered medications as needed  - Encourage mobilization and activity  - Nutrition services referral to assist patient with appropriate food choices  - Assess stoma site  - Consider wound care consult   Outcome: Progressing  Goal: Oral mucous membranes remain intact  Description: INTERVENTIONS  - Assess oral mucosa and hygiene practices  - Implement preventative oral hygiene regimen  - Implement oral medicated treatments as ordered  - Initiate Nutrition services referral as needed  Outcome: Progressing     Problem: PAIN - ADULT  Goal: Verbalizes/displays adequate comfort level or baseline comfort level  Description: Interventions:  - Encourage patient to monitor pain and request assistance  - Assess pain using appropriate pain scale  - Administer analgesics based on type and severity of pain and evaluate response  - Implement non-pharmacological measures as appropriate and evaluate response  - Consider cultural and social influences on pain and pain management  - Notify physician/advanced practitioner if interventions unsuccessful or patient reports new pain  Outcome: Progressing     Problem: INFECTION - ADULT  Goal: Absence or prevention of progression during hospitalization  Description: INTERVENTIONS:  - Assess and monitor for signs and symptoms of infection  - Monitor lab/diagnostic results  - Monitor all insertion sites, i e  indwelling lines, tubes, and drains  - Monitor endotracheal if appropriate and nasal secretions for changes in amount and color  - [place] appropriate cooling/warming therapies per order  - Administer medications as ordered  - Instruct and encourage patient and family to use good hand hygiene technique  - Identify and instruct in appropriate isolation precautions for identified infection/condition  Outcome: Progressing  Goal: Absence of fever/infection during neutropenic period  Description: INTERVENTIONS:  - Monitor WBC    Outcome: Progressing     Problem: SAFETY ADULT  Goal: Patient will remain free of falls  Description: INTERVENTIONS:  - Educate patient/family on patient safety including physical limitations  - Instruct patient to call for assistance with activity   - Consult OT/PT to assist with strengthening/mobility   - Keep Call bell within reach  - Keep bed low and locked with side rails adjusted as appropriate  - Keep care items and personal belongings within reach  - Initiate and maintain comfort rounds  - Make Fall Risk Sign visible to staff  - Offer Toileting every 2 Hours, in advance of need  - Initiate/Maintain bed/chair alarm  - Obtain necessary fall risk management equipment: yellow socks/bracelet  - Apply yellow socks and bracelet for high fall risk patients  - Consider moving patient to room near nurses station  Outcome: Progressing  Goal: Maintain or return to baseline ADL function  Description: INTERVENTIONS:  -  Assess patient's ability to carry out ADLs; assess patient's baseline for ADL function and identify physical deficits which impact ability to perform ADLs (bathing, care of mouth/teeth, toileting, grooming, dressing, etc )  - Assess/evaluate cause of self-care deficits   - Assess range of motion  - Assess patient's mobility; develop plan if impaired  - Assess patient's need for assistive devices and provide as appropriate  - Encourage maximum independence but intervene and supervise when necessary  - Involve family in performance of ADLs  - Assess for home care needs following discharge   - Consider OT consult to assist with ADL evaluation and planning for discharge  - Provide patient education as appropriate  Outcome: Progressing  Goal: Maintains/Returns to pre admission functional level  Description: INTERVENTIONS:  - Perform BMAT or MOVE assessment daily    - Set and communicate daily mobility goal to care team and patient/family/caregiver  - Collaborate with rehabilitation services on mobility goals if consulted  - Perform Range of Motion 3 times a day  - Reposition patient every 2 hours  - Dangle patient 3 times a day  - Stand patient 3 times a day  - Ambulate patient 3 times a day  - Out of bed to chair 3 times a day   - Out of bed for meals 3 times a day  - Out of bed for toileting  - Record patient progress and toleration of activity level   Outcome: Progressing     Problem: DISCHARGE PLANNING  Goal: Discharge to home or other facility with appropriate resources  Description: INTERVENTIONS:  - Identify barriers to discharge w/patient and caregiver  - Arrange for needed discharge resources and transportation as appropriate  - Identify discharge learning needs (meds, wound care, etc )  - Arrange for interpretive services to assist at discharge as needed  - Refer to Case Management Department for coordinating discharge planning if the patient needs post-hospital services based on physician/advanced practitioner order or complex needs related to functional status, cognitive ability, or social support system  Outcome: Progressing     Problem: Knowledge Deficit  Goal: Patient/family/caregiver demonstrates understanding of disease process, treatment plan, medications, and discharge instructions  Description: Complete learning assessment and assess knowledge base  Interventions:  - Provide teaching at level of understanding  - Provide teaching via preferred learning methods  Outcome: Progressing     Problem: Nutrition/Hydration-ADULT  Goal: Nutrient/Hydration intake appropriate for improving, restoring or maintaining nutritional needs  Description: Monitor and assess patient's nutrition/hydration status for malnutrition   Collaborate with interdisciplinary team and initiate plan and interventions as ordered  Monitor patient's weight and dietary intake as ordered or per policy  Utilize nutrition screening tool and intervene as necessary  Determine patient's food preferences and provide high-protein, high-caloric foods as appropriate       INTERVENTIONS:  - Monitor oral intake, urinary output, labs, and treatment plans  - Assess nutrition and hydration status and recommend course of action  - Evaluate amount of meals eaten  - Assist patient with eating if necessary   - Allow adequate time for meals  - Recommend/ encourage appropriate diets, oral nutritional supplements, and vitamin/mineral supplements  - Order, calculate, and assess calorie counts as needed  - Recommend, monitor, and adjust tube feedings and TPN/PPN based on assessed needs  - Assess need for intravenous fluids  - Provide specific nutrition/hydration education as appropriate  - Include patient/family/caregiver in decisions related to nutrition  Outcome: Progressing

## 2023-06-04 NOTE — PROGRESS NOTES
[place]  Progress Note  Name: Thiago Fowler  MRN: [id number]  Unit/Bed#: -01 I Date of Admission: 6/2/2023   Date of Service: 6/4/2023  Hospital Day: 1    Assessment/Plan   * Asthma with COPD with exacerbation [facility])  Assessment & Plan  · Presents with acute worsening of dyspnea over the last 1 week, however states that her breathing has not been doing well over the last couple of months  · Home regimen: Bevespi twice daily -she is to use Pulmicort nebulizer twice daily, however her nebulizer machine broke so she has not done that for a while  · CT without focal consolidation, hold on further antibiotics for now  · Xopenex and Atrovent 3 times daily  · Pulmicort and Perforomist twice daily  · Solu-Medrol 40 Mg every 8 hours-> transition to bid tomorrow 6/5  · Pulmonology following    Type 2 diabetes mellitus with stage 2 chronic kidney disease, with long-term current use of insulin [facility])  Assessment & Plan  Lab Results   Component Value Date    HGBA1C 9 9 (H) 05/11/2023       Recent Labs     06/04/23  0020 06/04/23  0157 06/04/23  0355 06/04/23  0612   POCGLU 218* 187* 157* 174*       Blood Sugar Average: Last 72 hrs:  (P) 286 4   · Home regimen: Lantus 30 units at bedtime, Humalog 24 units with breakfast and 12 units with lunch and dinner  · Transitioned to insulin gtt with 15mL/hr IVF do to hx of chf-> dc'd  · Initiated Lantus 50 U bid with humalog 15U tid ac algorithm 5 ISS  · ISS  · Hypoglycemic protocol    Hepatic steatosis  Assessment & Plan  · Noted on admission CT scan  · Patient without transaminitis and coags are normal, as well as total bilirubin, alk phos slightly elevated at 157  · Would benefit from GI referral on discharge    Morbid obesity ([address] )  Assessment & Plan  · Affects all aspects of care, especially her dyspnea  · Dietary and lifestyle changes recommended    Chronic respiratory failure with hypoxia and hypercapnia (HCC)  Assessment & Plan  · Wears 4 L supplemental oxygen via nasal cannula at baseline  · SPO2 stable on home oxygen    Lactic acidosis  Assessment & Plan  · Lactic acidosis secondary to albuterol nebulizer  · lactic acid still elevated IVF not needed to clear    Chronic diastolic congestive heart failure (HCC)  Assessment & Plan  Wt Readings from Last 3 Encounters:   06/02/23 130 kg (286 lb)   05/17/23 130 kg (286 lb)   03/29/23 132 kg (290 lb)     · Last echo October 2022: LVEF 60%  G2 DD  No significant valvular disease  · Home regimen: Torsemide 60 Mg twice daily  · Patient given Lasix 40 in the ED, however does not examine fluid overloaded and CT does not show pulmonary congestion  · Continue home torsemide  · I/os Daily weights  · Sodium and fluid restriction    Paroxysmal atrial fibrillation (HCC)  Assessment & Plan  · Home regimen: Metoprolol 100 Mg daily and anticoagulation with Eliquis 5 mg twice daily  · Rate controlled and in sinus rhythm on admission  · Continue home regimen    Obstructive sleep apnea  Assessment & Plan  · Utilizes CPAP at bedtime with AutoPap 12-20  · Continue         VTE Pharmacologic Prophylaxis: VTE Score: 6 High Risk (Score >/= 5) - Pharmacological DVT Prophylaxis Ordered: apixaban (Eliquis)  Sequential Compression Devices Ordered  Patient Centered Rounds: I performed bedside rounds with nursing staff today  Discussions with Specialists or Other Care Team Provider: Pulmonology, case management    Education and Discussions with Family / Patient: Patient declined call to   Total Time Spent on Date of Encounter in care of patient: 45 minutes This time was spent on one or more of the following: performing physical exam; counseling and coordination of care; obtaining or reviewing history; documenting in the medical record; reviewing/ordering tests, medications or procedures; communicating with other healthcare professionals and discussing with patient's family/caregivers      Current Length of Stay: 1 day(s)  Current Patient Status: Inpatient   Certification Statement: The patient will continue to require additional inpatient hospital stay due to Acute COPD exacerbation  Discharge Plan: Anticipate discharge in 48-72 hrs to home  Code Status: Level 1 - Full Code    Subjective:   Surekha Reynolds was seen and examined at bedside  No acute events over night  Discussed plan of care  All questions and concerns were answered and addressed  Is still having conversational dyspnea  Has dyspnea with slight movement  States that she feels better  Objective:     Vitals:   Temp (24hrs), Av 6 °F (37 °C), Min:97 6 °F (36 4 °C), Max:100 3 °F (37 9 °C)    Temp:  [97 6 °F (36 4 °C)-100 3 °F (37 9 °C)] 97 6 °F (36 4 °C)  HR:  [] 104  Resp:  [16-18] 18  BP: (118-150)/(56-73) 150/70  SpO2:  [88 %-95 %] 92 %  Body mass index is 50 66 kg/m²  Input and Output Summary (last 24 hours): Intake/Output Summary (Last 24 hours) at 2023 0925  Last data filed at 2023 0910  Gross per 24 hour   Intake 921 71 ml   Output 2350 ml   Net -1428 29 ml       Physical Exam:   Physical Exam  Vitals and nursing note reviewed  Constitutional:       General: She is in acute distress  Appearance: She is obese  She is not ill-appearing  HENT:      Head: Normocephalic and atraumatic  Cardiovascular:      Rate and Rhythm: Normal rate  Pulses: Normal pulses  Heart sounds: Normal heart sounds  Pulmonary:      Effort: Respiratory distress present  Breath sounds: No wheezing  Comments: Conversational dyspnea, diminished breath sounds bilateral basilar area  Upper lobes good airway entry no wheezing  Abdominal:      General: Abdomen is flat  Bowel sounds are normal       Palpations: Abdomen is soft  Musculoskeletal:      Right lower leg: Edema present  Left lower leg: Edema present        Comments: Tremulous most likely secondary to recent completion of nebulizer treatment, mild bilateral lower extremity pitting edema   Skin:     General: Skin is warm  Neurological:      General: No focal deficit present  Mental Status: She is alert and oriented to person, place, and time  Additional Data:     Labs:  Results from last 7 days   Lab Units 06/04/23  0358 06/03/23  1517   EOS PCT %  --  0   HEMATOCRIT % 30 7* 33 7*   HEMOGLOBIN g/dL 9 2* 9 7*   LYMPHS PCT %  --  7*   MONOS PCT %  --  6   NEUTROS PCT %  --  87*   PLATELETS Thousands/uL 351 390   WBC Thousand/uL 17 27* 15 73*     Results from last 7 days   Lab Units 06/04/23  0358 06/03/23  0522 06/02/23  2318   ANION GAP mmol/L 8   < > 10   ALBUMIN g/dL  --   --  4 3   ALK PHOS U/L  --   --  157*   ALT U/L  --   --  37   AST U/L  --   --  24   BUN mg/dL 18   < > 16   CALCIUM mg/dL 8 8   < > 9 3   CHLORIDE mmol/L 95*   < > 92*   CO2 mmol/L 36*   < > 35*   CREATININE mg/dL 0 63   < > 0 76   GLUCOSE RANDOM mg/dL 159*   < > 222*   POTASSIUM mmol/L 3 7   < > 3 8   SODIUM mmol/L 139   < > 137   TOTAL BILIRUBIN mg/dL  --   --  0 39    < > = values in this interval not displayed  Results from last 7 days   Lab Units 06/02/23  2318   INR  1 09     Results from last 7 days   Lab Units 06/04/23  0818 06/04/23  0612 06/04/23  0355 06/04/23  0157 06/04/23  0020 06/03/23  2156 06/03/23  1950 06/03/23  1703 06/03/23  1615 06/03/23  1436 06/03/23  1125 06/03/23  0937   POC GLUCOSE mg/dl 214* 174* 157* 187* 218* 283* 255* 317* 387* >500* >500* 498*         Results from last 7 days   Lab Units 06/04/23  0358 06/03/23  1716 06/03/23  1517 06/03/23  0643 06/03/23  0522 06/03/23  0123 06/02/23  2318   LACTIC ACID mmol/L 2 4* 4 3* 4 7* 2 9* 4 0*   < > 2 7*   PROCALCITONIN ng/ml 0 06  --   --   --  0 06  --  0 05    < > = values in this interval not displayed         Lines/Drains:  Invasive Devices     Peripheral Intravenous Line  Duration           Peripheral IV 06/02/23 Left Antecubital 1 day    Peripheral IV 06/03/23 Left;Ventral (anterior) Forearm <1 day Imaging: No pertinent imaging reviewed  Recent Cultures (last 7 days):   Results from last 7 days   Lab Units 06/03/23  0031 06/02/23  2318   BLOOD CULTURE  No Growth at 24 hrs  No Growth at 24 hrs         Last 24 Hours Medication List:   Current Facility-Administered Medications   Medication Dose Route Frequency Provider Last Rate   • acetaminophen  650 mg Oral Q6H PRN Perlita Kovacs PA-C     • aluminum-magnesium hydroxide-simethicone  30 mL Oral Q6H PRN Perlita Kovacs PA-C     • apixaban  5 mg Oral BID Perlita Kovacs PA-C     • atorvastatin  40 mg Oral After Katherine Johnson PA-C     • budesonide  0 5 mg Nebulization Q12H Perlita Kovacs PA-C     • escitalopram  20 mg Oral Daily Perlita Kovacs PA-C     • fluticasone  1 spray Nasal BID Perlita Kovacs PA-C     • formoterol  20 mcg Nebulization Q12H Perlita Kovacs PA-C     • insulin glargine  50 Units Subcutaneous Q12H [facility] Joane Lennox, MD     • insulin lispro  15 Units Subcutaneous TID With Meals Joane Lennox, MD     • insulin lispro  4-20 Units Subcutaneous TID AC Joane Lennox, MD     • insulin lispro  4-20 Units Subcutaneous HS Joane Lennox, MD     • ipratropium  0 5 mg Nebulization TID Perlita Kovacs PA-C     • levalbuterol  1 25 mg Nebulization TID Perlita Kovacs PA-C     • loratadine  10 mg Oral Daily Perlita Kovacs PA-C     • LORazepam  0 5 mg Oral Q6H PRN Perlita Kovacs PA-C     • LORazepam  1 mg Oral HS Perlita Kovacs PA-C     • methylPREDNISolone sodium succinate  40 mg Intravenous Q8H Perlita Kovacs PA-C     • metoprolol succinate  100 mg Oral Daily Perlita Kovacs PA-C     • montelukast  10 mg Oral HS Perlita Kovacs PA-C     • pantoprazole  40 mg Oral Daily Before Breakfast Perlita Kovacs PA-C     • potassium chloride  20 mEq Oral BID Perlita Kovacs PA-C     • senna  1 tablet Oral HS PRN Perlita Kovacs PA-C     • sodium chloride (PF)  3 mL Intravenous Q8H 87 Alyssa Edwards PA-C     • torsemide  60 mg Oral BID (diuretic) Eugenio Hernandez PA-C     • traZODone  150 mg Oral HS Eugenio Hernandez PA-C          Today, Patient Was Seen By: Remberto Joyce MD    **Please Note: This note may have been constructed using a voice recognition system  **

## 2023-06-05 ENCOUNTER — PATIENT OUTREACH (OUTPATIENT)
Dept: FAMILY MEDICINE CLINIC | Facility: HOSPITAL | Age: 63
End: 2023-06-05

## 2023-06-05 LAB
ANION GAP SERPL CALCULATED.3IONS-SCNC: 11 MMOL/L (ref 4–13)
BUN SERPL-MCNC: 26 MG/DL (ref 5–25)
CALCIUM SERPL-MCNC: 8.2 MG/DL (ref 8.4–10.2)
CHLORIDE SERPL-SCNC: 89 MMOL/L (ref 96–108)
CO2 SERPL-SCNC: 31 MMOL/L (ref 21–32)
CREAT SERPL-MCNC: 0.94 MG/DL (ref 0.6–1.3)
GFR SERPL CREATININE-BSD FRML MDRD: 64 ML/MIN/1.73SQ M
GLUCOSE SERPL-MCNC: 101 MG/DL (ref 65–140)
GLUCOSE SERPL-MCNC: 104 MG/DL (ref 65–140)
GLUCOSE SERPL-MCNC: 166 MG/DL (ref 65–140)
GLUCOSE SERPL-MCNC: 187 MG/DL (ref 65–140)
GLUCOSE SERPL-MCNC: 198 MG/DL (ref 65–140)
GLUCOSE SERPL-MCNC: 232 MG/DL (ref 65–140)
GLUCOSE SERPL-MCNC: 271 MG/DL (ref 65–140)
GLUCOSE SERPL-MCNC: 337 MG/DL (ref 65–140)
GLUCOSE SERPL-MCNC: 379 MG/DL (ref 65–140)
GLUCOSE SERPL-MCNC: 471 MG/DL (ref 65–140)
GLUCOSE SERPL-MCNC: 79 MG/DL (ref 65–140)
GLUCOSE SERPL-MCNC: 95 MG/DL (ref 65–140)
GLUCOSE SERPL-MCNC: >500 MG/DL (ref 65–140)
POTASSIUM SERPL-SCNC: 4.3 MMOL/L (ref 3.5–5.3)
SODIUM SERPL-SCNC: 131 MMOL/L (ref 135–147)

## 2023-06-05 PROCEDURE — 82948 REAGENT STRIP/BLOOD GLUCOSE: CPT

## 2023-06-05 PROCEDURE — 99232 SBSQ HOSP IP/OBS MODERATE 35: CPT | Performed by: INTERNAL MEDICINE

## 2023-06-05 PROCEDURE — 94640 AIRWAY INHALATION TREATMENT: CPT

## 2023-06-05 PROCEDURE — 94660 CPAP INITIATION&MGMT: CPT

## 2023-06-05 PROCEDURE — 87505 NFCT AGENT DETECTION GI: CPT | Performed by: STUDENT IN AN ORGANIZED HEALTH CARE EDUCATION/TRAINING PROGRAM

## 2023-06-05 PROCEDURE — 94760 N-INVAS EAR/PLS OXIMETRY 1: CPT

## 2023-06-05 PROCEDURE — 99232 SBSQ HOSP IP/OBS MODERATE 35: CPT | Performed by: HOSPITALIST

## 2023-06-05 PROCEDURE — 80048 BASIC METABOLIC PNL TOTAL CA: CPT | Performed by: INTERNAL MEDICINE

## 2023-06-05 PROCEDURE — 99255 IP/OBS CONSLTJ NEW/EST HI 80: CPT | Performed by: STUDENT IN AN ORGANIZED HEALTH CARE EDUCATION/TRAINING PROGRAM

## 2023-06-05 PROCEDURE — 87493 C DIFF AMPLIFIED PROBE: CPT | Performed by: STUDENT IN AN ORGANIZED HEALTH CARE EDUCATION/TRAINING PROGRAM

## 2023-06-05 RX ORDER — INSULIN GLARGINE 100 [IU]/ML
50 INJECTION, SOLUTION SUBCUTANEOUS
Status: DISCONTINUED | OUTPATIENT
Start: 2023-06-05 | End: 2023-06-06 | Stop reason: HOSPADM

## 2023-06-05 RX ORDER — INSULIN LISPRO 100 [IU]/ML
1-6 INJECTION, SOLUTION INTRAVENOUS; SUBCUTANEOUS
Status: DISCONTINUED | OUTPATIENT
Start: 2023-06-06 | End: 2023-06-06 | Stop reason: HOSPADM

## 2023-06-05 RX ORDER — INSULIN LISPRO 100 [IU]/ML
30 INJECTION, SOLUTION INTRAVENOUS; SUBCUTANEOUS
Status: DISCONTINUED | OUTPATIENT
Start: 2023-06-06 | End: 2023-06-06 | Stop reason: HOSPADM

## 2023-06-05 RX ORDER — INSULIN LISPRO 100 [IU]/ML
15 INJECTION, SOLUTION INTRAVENOUS; SUBCUTANEOUS
Status: DISCONTINUED | OUTPATIENT
Start: 2023-06-06 | End: 2023-06-06 | Stop reason: HOSPADM

## 2023-06-05 RX ORDER — INSULIN LISPRO 100 [IU]/ML
1-6 INJECTION, SOLUTION INTRAVENOUS; SUBCUTANEOUS
Status: DISCONTINUED | OUTPATIENT
Start: 2023-06-05 | End: 2023-06-06 | Stop reason: HOSPADM

## 2023-06-05 RX ORDER — INSULIN GLARGINE 100 [IU]/ML
30 INJECTION, SOLUTION SUBCUTANEOUS
Status: DISCONTINUED | OUTPATIENT
Start: 2023-06-05 | End: 2023-06-05

## 2023-06-05 RX ORDER — INSULIN LISPRO 100 [IU]/ML
29 INJECTION, SOLUTION INTRAVENOUS; SUBCUTANEOUS
Status: DISCONTINUED | OUTPATIENT
Start: 2023-06-06 | End: 2023-06-05

## 2023-06-05 RX ORDER — INSULIN LISPRO 100 [IU]/ML
12 INJECTION, SOLUTION INTRAVENOUS; SUBCUTANEOUS
Status: DISCONTINUED | OUTPATIENT
Start: 2023-06-06 | End: 2023-06-05

## 2023-06-05 RX ORDER — INSULIN LISPRO 100 [IU]/ML
24 INJECTION, SOLUTION INTRAVENOUS; SUBCUTANEOUS
Status: DISCONTINUED | OUTPATIENT
Start: 2023-06-06 | End: 2023-06-05

## 2023-06-05 RX ORDER — INSULIN GLARGINE 100 [IU]/ML
40 INJECTION, SOLUTION SUBCUTANEOUS
Status: DISCONTINUED | OUTPATIENT
Start: 2023-06-05 | End: 2023-06-05

## 2023-06-05 RX ORDER — METHYLPREDNISOLONE SODIUM SUCCINATE 40 MG/ML
40 INJECTION, POWDER, LYOPHILIZED, FOR SOLUTION INTRAMUSCULAR; INTRAVENOUS DAILY
Status: DISCONTINUED | OUTPATIENT
Start: 2023-06-06 | End: 2023-06-06 | Stop reason: HOSPADM

## 2023-06-05 RX ADMIN — TORSEMIDE 60 MG: 20 TABLET ORAL at 09:37

## 2023-06-05 RX ADMIN — IPRATROPIUM BROMIDE 0.5 MG: 0.5 SOLUTION RESPIRATORY (INHALATION) at 19:50

## 2023-06-05 RX ADMIN — FORMOTEROL FUMARATE DIHYDRATE 20 MCG: 20 SOLUTION RESPIRATORY (INHALATION) at 19:50

## 2023-06-05 RX ADMIN — FLUTICASONE PROPIONATE 1 SPRAY: 50 SPRAY, METERED NASAL at 09:47

## 2023-06-05 RX ADMIN — POTASSIUM CHLORIDE 20 MEQ: 1500 TABLET, EXTENDED RELEASE ORAL at 09:37

## 2023-06-05 RX ADMIN — SODIUM CHLORIDE, PRESERVATIVE FREE 3 ML: 5 INJECTION INTRAVENOUS at 14:37

## 2023-06-05 RX ADMIN — SODIUM CHLORIDE, PRESERVATIVE FREE 3 ML: 5 INJECTION INTRAVENOUS at 06:22

## 2023-06-05 RX ADMIN — SODIUM CHLORIDE 12 UNITS/HR: 9 INJECTION, SOLUTION INTRAVENOUS at 03:26

## 2023-06-05 RX ADMIN — METHYLPREDNISOLONE SODIUM SUCCINATE 40 MG: 40 INJECTION, POWDER, FOR SOLUTION INTRAMUSCULAR; INTRAVENOUS at 00:13

## 2023-06-05 RX ADMIN — ACETAMINOPHEN 650 MG: 325 TABLET ORAL at 10:47

## 2023-06-05 RX ADMIN — INSULIN LISPRO 6 UNITS: 100 INJECTION, SOLUTION INTRAVENOUS; SUBCUTANEOUS at 22:42

## 2023-06-05 RX ADMIN — MONTELUKAST 10 MG: 10 TABLET, FILM COATED ORAL at 22:01

## 2023-06-05 RX ADMIN — TORSEMIDE 60 MG: 20 TABLET ORAL at 17:02

## 2023-06-05 RX ADMIN — Medication 250 MG: at 09:37

## 2023-06-05 RX ADMIN — TRAZODONE HYDROCHLORIDE 150 MG: 150 TABLET ORAL at 22:01

## 2023-06-05 RX ADMIN — FORMOTEROL FUMARATE DIHYDRATE 20 MCG: 20 SOLUTION RESPIRATORY (INHALATION) at 07:47

## 2023-06-05 RX ADMIN — APIXABAN 5 MG: 5 TABLET, FILM COATED ORAL at 09:36

## 2023-06-05 RX ADMIN — INSULIN GLARGINE 50 UNITS: 100 INJECTION, SOLUTION SUBCUTANEOUS at 22:01

## 2023-06-05 RX ADMIN — LORATADINE 10 MG: 10 TABLET ORAL at 09:37

## 2023-06-05 RX ADMIN — Medication 250 MG: at 17:02

## 2023-06-05 RX ADMIN — AZITHROMYCIN MONOHYDRATE 500 MG: 500 INJECTION, POWDER, LYOPHILIZED, FOR SOLUTION INTRAVENOUS at 10:49

## 2023-06-05 RX ADMIN — PANTOPRAZOLE SODIUM 40 MG: 40 TABLET, DELAYED RELEASE ORAL at 09:37

## 2023-06-05 RX ADMIN — BUDESONIDE 0.5 MG: 0.5 INHALANT RESPIRATORY (INHALATION) at 19:50

## 2023-06-05 RX ADMIN — ESCITALOPRAM OXALATE 20 MG: 20 TABLET ORAL at 09:37

## 2023-06-05 RX ADMIN — BUDESONIDE 0.5 MG: 0.5 INHALANT RESPIRATORY (INHALATION) at 07:47

## 2023-06-05 RX ADMIN — ATORVASTATIN CALCIUM 40 MG: 40 TABLET, FILM COATED ORAL at 17:02

## 2023-06-05 RX ADMIN — LORAZEPAM 1 MG: 1 TABLET ORAL at 22:01

## 2023-06-05 RX ADMIN — LEVALBUTEROL HYDROCHLORIDE 1.25 MG: 1.25 SOLUTION RESPIRATORY (INHALATION) at 19:50

## 2023-06-05 RX ADMIN — IPRATROPIUM BROMIDE 0.5 MG: 0.5 SOLUTION RESPIRATORY (INHALATION) at 07:47

## 2023-06-05 RX ADMIN — POTASSIUM CHLORIDE 20 MEQ: 1500 TABLET, EXTENDED RELEASE ORAL at 17:02

## 2023-06-05 RX ADMIN — FLUTICASONE PROPIONATE 1 SPRAY: 50 SPRAY, METERED NASAL at 22:04

## 2023-06-05 RX ADMIN — METHYLPREDNISOLONE SODIUM SUCCINATE 40 MG: 40 INJECTION, POWDER, FOR SOLUTION INTRAMUSCULAR; INTRAVENOUS at 09:37

## 2023-06-05 RX ADMIN — APIXABAN 5 MG: 5 TABLET, FILM COATED ORAL at 17:02

## 2023-06-05 RX ADMIN — SODIUM CHLORIDE, PRESERVATIVE FREE 3 ML: 5 INJECTION INTRAVENOUS at 22:45

## 2023-06-05 RX ADMIN — METOPROLOL SUCCINATE 100 MG: 50 TABLET, EXTENDED RELEASE ORAL at 09:36

## 2023-06-05 RX ADMIN — LEVALBUTEROL HYDROCHLORIDE 1.25 MG: 1.25 SOLUTION RESPIRATORY (INHALATION) at 07:47

## 2023-06-05 NOTE — PROGRESS NOTES
[place]  Progress Note  Name: Mery Gomez  MRN: [id number]  Unit/Bed#: -01 I Date of Admission: 6/2/2023   Date of Service: 6/5/2023 I Hospital Day: 2    Assessment/Plan   Hepatic steatosis  Assessment & Plan  · Noted on admission CT scan  · Patient without transaminitis and coags are normal, as well as total bilirubin, alk phos slightly elevated at 157  · Would benefit from GI referral on discharge    Morbid obesity ([address] )  Assessment & Plan  · Affects all aspects of care, especially her dyspnea  · Dietary and lifestyle changes recommended    Chronic respiratory failure with hypoxia and hypercapnia (HCC)  Assessment & Plan  · Wears 4 L supplemental oxygen via nasal cannula at baseline  · SPO2 stable on home oxygen    Lactic acidosis  Assessment & Plan  · Lactic acidosis secondary to albuterol nebulizer  · lactic acid still elevated IVF not needed to clear    Chronic diastolic congestive heart failure (HCC)  Assessment & Plan  Wt Readings from Last 3 Encounters:   06/05/23 129 kg (284 lb)   05/17/23 130 kg (286 lb)   03/29/23 132 kg (290 lb)     · Last echo October 2022: LVEF 60%  G2 DD  No significant valvular disease  · Home regimen:  Torsemide 60 Mg twice daily  · Patient given Lasix 40 in the ED, however does not examine fluid overloaded and CT does not show pulmonary congestion  · Continue home torsemide  · I/os Daily weights  · Sodium and fluid restriction    Paroxysmal atrial fibrillation (HCC)  Assessment & Plan  · Home regimen: Metoprolol 100 Mg daily and anticoagulation with Eliquis 5 mg twice daily  · Rate controlled and in sinus rhythm on admission  · Continue home regimen    Type 2 diabetes mellitus with stage 2 chronic kidney disease, with long-term current use of insulin [facility])  Assessment & Plan  Lab Results   Component Value Date    HGBA1C 9 9 (H) 05/11/2023       Recent Labs     06/05/23  0909 06/05/23  1054 06/05/23  1227 06/05/23  1415   POCGLU 232* 187* 101 379*       Blood Sugar Average: Last 72 hrs:  (P) 321 [id number]   · Home regimen: Lantus 30 units at bedtime, Humalog 24 units with breakfast and 12 units with lunch and dinner  · Transitioned to insulin gtt with 15mL/hr -> transition off insulin gtt tonight as blood sugars improved  · ISS  · Hypoglycemic protocol    Obstructive sleep apnea  Assessment & Plan  · Utilizes CPAP at bedtime with AutoPap 12-20  · Continue    * Asthma with COPD with exacerbation (HCC)  Assessment & Plan  · Presents with acute worsening of dyspnea over the last 1 week, however states that her breathing has not been doing well over the last couple of months  · Home regimen: Bevespi twice daily -she is to use Pulmicort nebulizer twice daily, however her nebulizer machine broke so she has not done that for a while  · CT without focal consolidation, hold on further antibiotics for now  · Xopenex and Atrovent 3 times daily  · Pulmicort and Perforomist twice daily  · Solu-Medrol 40 Mg every 8 hours-> weaned by pulm  · Pulmonology following, apprec recs  VTE  Prophylaxis:   Pharmacologic: in place  Mechanical VTE Prophylaxis in Place: Yes    Patient Centered Rounds: I have performed bedside rounds with nursing staff today  Discussions with Specialists or Other Care Team Provider: case management    Education and Discussions with Family / Patient: pt      Current Length of Stay: 2 day(s)    Current Patient Status: Inpatient        Code Status: Level 1 - Full Code    Discharge Plan: Pt will require continued inpatient hospitalization  Subjective:     Pt feels breathing is improving  stable on 4L  Patient is seen and examined at bedside  All other ROS are negative      Objective:     Vitals:   Temp (24hrs), Av 7 °F (36 5 °C), Min:97 6 °F (36 4 °C), Max:97 8 °F (36 6 °C)    Temp:  [97 6 °F (36 4 °C)-97 8 °F (36 6 °C)] 97 8 °F (36 6 °C)  HR:  [81-89] 89  Resp:  [16-20] 20  BP: (101-121)/(59-70) 101/59  SpO2:  [86 %-96 %] 86 %  Body mass index is 50 31 kg/m²  Input and Output Summary (last 24 hours): Intake/Output Summary (Last 24 hours) at 6/5/2023 1446  Last data filed at 6/5/2023 0906  Gross per 24 hour   Intake 1000 ml   Output --   Net 1000 ml       Physical Exam:       GEN: No acute distress, comfortable, on o2  HEEENT: No JVD, PERRLA, no scleral icterus  RESP: Lungs clear to auscultation bilaterally  CV: RRR, +s1/s2   ABD: SOFT NON TENDER, POSITIVE BOWEL SOUNDS, NO DISTENTION  PSYCH: CALM  NEURO: Mentation baseline, NO FOCAL DEFICITS  SKIN: NO RASH  EXTREM: NO EDEMA    Additional Data:     Labs:    Results from last 7 days   Lab Units 06/04/23  0358 06/03/23  1517   EOS PCT %  --  0   HEMATOCRIT % 30 7* 33 7*   HEMOGLOBIN g/dL 9 2* 9 7*   LYMPHS PCT %  --  7*   MONOS PCT %  --  6   NEUTROS PCT %  --  87*   PLATELETS Thousands/uL 351 390   WBC Thousand/uL 17 27* 15 73*     Results from last 7 days   Lab Units 06/04/23  0358 06/03/23  0522 06/02/23  2318   ANION GAP mmol/L 8   < > 10   ALBUMIN g/dL  --   --  4 3   ALK PHOS U/L  --   --  157*   ALT U/L  --   --  37   AST U/L  --   --  24   BUN mg/dL 18   < > 16   CALCIUM mg/dL 8 8   < > 9 3   CHLORIDE mmol/L 95*   < > 92*   CO2 mmol/L 36*   < > 35*   CREATININE mg/dL 0 63   < > 0 76   GLUCOSE RANDOM mg/dL 159*   < > 222*   POTASSIUM mmol/L 3 7   < > 3 8   SODIUM mmol/L 139   < > 137   TOTAL BILIRUBIN mg/dL  --   --  0 39    < > = values in this interval not displayed       Results from last 7 days   Lab Units 06/02/23  2318   INR  1 09     Results from last 7 days   Lab Units 06/05/23  1415 06/05/23  1227 06/05/23  1054 06/05/23  0909 06/05/23  4739 06/05/23  0717 06/05/23  9685 06/05/23  0415 06/05/23  0219 06/04/23  2351 06/04/23  2213 06/04/23  2058   POC GLUCOSE mg/dl 379* 101 187* 232* 79 95 104 166* 198* 257* 318* 332*         Results from last 7 days   Lab Units 06/04/23  0358 06/03/23  1716 06/03/23  1517 06/03/23  6391 06/03/23  0522 06/03/23  0123 06/02/23  2318   LACTIC ACID mmol/L 2 4* 4 3* 4 7* 2 9* 4 0*   < > 2 7*   PROCALCITONIN ng/ml 0 06  --   --   --  0 06  --  0 05    < > = values in this interval not displayed  Lines/Drains:  Invasive Devices     Peripheral Intravenous Line  Duration           Peripheral IV 06/03/23 Left;Ventral (anterior) Forearm 1 day                Telemetry:        * I Have Reviewed All Lab Data Listed Above  Imaging:     Results for orders placed during the hospital encounter of 06/02/23    XR chest portable    Narrative  CHEST    INDICATION:   sob  COMPARISON: CXR 9/27/2022 and chest CT 6/3/2023  EXAM PERFORMED/VIEWS:  XR CHEST PORTABLE  FINDINGS:    Mild cardiomegaly  Lungs clear  No effusion or pneumothorax  Upper abdomen normal  Bones normal for age  Impression  No acute cardiopulmonary disease  Workstation performed: [id number]    Results for orders placed during the hospital encounter of 09/27/22    XR chest pa & lateral    Narrative  CHEST    INDICATION:   J44 9: Chronic obstructive pulmonary disease, unspecified  R06 02: Shortness of breath  COMPARISON:  6/5/2021    EXAM PERFORMED/VIEWS:  XR CHEST PA & LATERAL      FINDINGS:    Cardiomediastinal silhouette appears unremarkable  Mild arch calcifications again noted  Mild emphysematous changes are seen  Bilateral lower lobe hazy pulmonary infiltrates and/or subsegmental atelectasis noted  Upper to midlung fields appear adequately aerated and clear  No pneumothorax or pleural effusion  Osseous structures appear within normal limits for patient age  No free air in the upper abdomen  Impression  Mild emphysematous changes are seen  Bilateral lower lobe hazy pulmonary infiltrates and/or subsegmental atelectasis noted  Upper to midlung fields appear adequately aerated and clear            Workstation performed: [id number]      *I have reviewed all imaging reports listed above      Recent Cultures (last 7 days): Results from last 7 days   Lab Units 06/03/23  0031 06/02/23  2318   BLOOD CULTURE  No Growth at 48 hrs  No Growth at 48 hrs         Last 24 Hours Medication List:   Current Facility-Administered Medications   Medication Dose Route Frequency Provider Last Rate   • acetaminophen  650 mg Oral Q6H PRN Luz Moreno MD     • aluminum-magnesium hydroxide-simethicone  30 mL Oral Q6H PRN Luz Moreno MD     • apixaban  5 mg Oral BID Luz Moreno MD     • atorvastatin  40 mg Oral After Darius López MD     • budesonide  0 5 mg Nebulization Q12H Luz Moreno MD     • escitalopram  20 mg Oral Daily Luz Moreno MD     • fluticasone  1 spray Nasal BID Luz Moreno MD     • formoterol  20 mcg Nebulization Q12H Luz Moreno MD     • insulin glargine  30 Units Subcutaneous HS Anahi Cuevas MD     • [START ON 6/6/2023] insulin lispro  12 Units Subcutaneous Daily With Lunch Anahi Cuevas MD     • [START ON 6/6/2023] insulin lispro  12 Units Subcutaneous Daily With Barron Estrada MD     • [START ON 6/6/2023] insulin lispro  24 Units Subcutaneous Daily With Breakfast Anahi Cuevas MD     • insulin regular (HumuLIN R,NovoLIN R) 1 Units/mL in sodium chloride 0 9 % 100 mL infusion  0 3-21 Units/hr Intravenous Titrated Anahi Cuevas MD 16 Units/hr (06/05/23 1433)   • ipratropium  0 5 mg Nebulization TID Luz Moreno MD     • levalbuterol  1 25 mg Nebulization TID Luz Moreno MD     • loratadine  10 mg Oral Daily Luz Moreno MD     • LORazepam  0 5 mg Oral Q6H PRN Luz Moreno MD     • LORazepam  1 mg Oral HS Luz Moreno MD     • [START ON 6/6/2023] methylPREDNISolone sodium succinate  40 mg Intravenous Daily Alton Perkins MD     • metoprolol succinate  100 mg Oral Daily Luz Moreno MD     • montelukast  10 mg Oral HS Luz Moreno MD     • pantoprazole  40 mg Oral Daily Before Breakfast Luz Moreno MD     • potassium chloride  20 mEq Oral BID Armen Herron MD     • saccharomyces boulardii  250 mg Oral BID Armen Herron MD     • senna  1 tablet Oral HS PRN Armen Herron MD     • sodium chloride (PF)  3 mL Intravenous Q8H Marlou Pallas, MD     • sodium chloride  15 mL/hr Intravenous Continuous Armen Herron MD 15 mL/hr (06/04/23 1405)   • torsemide  60 mg Oral BID (diuretic) Armen Herron MD     • traZODone  150 mg Oral HS Armen Herron MD          Today, Patient Was Seen By: Nury Jones MD    ** Please Note: Dictation voice to text software may have been used in the creation of this document   **

## 2023-06-05 NOTE — ASSESSMENT & PLAN NOTE
· Presents with acute worsening of dyspnea over the last 1 week, however states that her breathing has not been doing well over the last couple of months  · Home regimen: Bevespi twice daily -she is to use Pulmicort nebulizer twice daily, however her nebulizer machine broke so she has not done that for a while  · CT without focal consolidation, hold on further antibiotics for now  · Xopenex and Atrovent 3 times daily  · Pulmicort and Perforomist twice daily  · Solu-Medrol 40 Mg every 8 hours-> weaned by pulm  · Pulmonology following, apprec recs

## 2023-06-05 NOTE — ASSESSMENT & PLAN NOTE
Lab Results   Component Value Date    HGBA1C 9 9 (H) 05/11/2023       Recent Labs     06/05/23  0909 06/05/23  1054 06/05/23  1227 06/05/23  1415   POCGLU 232* 187* 101 379*       Blood Sugar Average: Last 72 hrs:  (P) 043 [id number]   · Home regimen: Lantus 30 units at bedtime, Humalog 24 units with breakfast and 12 units with lunch and dinner  · Transitioned to insulin gtt with 15mL/hr -> transition off insulin gtt tonight as blood sugars improved  · ISS  · Hypoglycemic protocol

## 2023-06-05 NOTE — PROGRESS NOTES
Outpatient Care Management Note:    ADT alert received that patient was admitted on 6/2 with asthma and COPD  CM will attempt to outreach on discharge

## 2023-06-05 NOTE — ASSESSMENT & PLAN NOTE
Wt Readings from Last 3 Encounters:   06/05/23 129 kg (284 lb)   05/17/23 130 kg (286 lb)   03/29/23 132 kg (290 lb)     · Last echo October 2022: LVEF 60%  G2 DD  No significant valvular disease  · Home regimen:  Torsemide 60 Mg twice daily  · Patient given Lasix 40 in the ED, however does not examine fluid overloaded and CT does not show pulmonary congestion  · Continue home torsemide  · I/os Daily weights  · Sodium and fluid restriction

## 2023-06-05 NOTE — PROGRESS NOTES
"Progress Note - Pulmonary   Mattie Joy 57 y o  female MRN: [id number]  Unit/Bed#: -01 Encounter: [id number]    Assessment:    1  Acute on chronic hypoxic respiratory failure-back to baseline 4 L nasal cannula  2  Asthma COPD overlap with acute exacerbation   3  Reactive lung disease due to morbid obesity  4  Paroxysmal A-fib  5  Pulmonary hypertension    Plan:    · Continue Perforomist, Pulmicort, Atrovent Xopenex while inpatient  · Change steroids to 40 daily and can taper by 10 mg every 3 days  · Back to baseline home O2 at 4 L nasal cannula  · Follow-up as outpatient pulmonary    Chief Complaint:   \"I feel ok\"    Subjective:   Patient seen and examined sitting in a chair  Reports feeling okay  Mild nonproductive cough    Objective:     Vitals: Blood pressure 118/70, pulse 81, temperature 97 7 °F (36 5 °C), resp  rate 16, height 5' 3\" (1 6 m), weight 129 kg (284 lb), SpO2 95 %, not currently breastfeeding  ,Body mass index is 50 31 kg/m²  Intake/Output Summary (Last 24 hours) at 6/5/2023 1207  Last data filed at 6/5/2023 9858  Gross per 24 hour   Intake 1000 ml   Output --   Net 1000 ml       Invasive Devices     Peripheral Intravenous Line  Duration           Peripheral IV 06/03/23 Left;Ventral (anterior) Forearm 1 day                Physical Exam:   General: Awake alert and oriented, no acute distress  Pulmonary: No wheezing, no rhonchi  GI: Abdomen is soft, nontender  Cardiac: Regular rate and rhythm, no murmurs  Skin: No rashes, no jaundice  Neuro: Cranial nerves grossly intact, no focal deficits    Labs: I have personally reviewed pertinent lab results  Imaging and other studies: I have personally reviewed pertinent reports     and I have personally reviewed pertinent films in PACS      "

## 2023-06-05 NOTE — CONSULTS
Consultation - Víctor Valles 57 y o  female MRN: [id number]    Unit/Bed#: -01 Encounter: [id number]      Assessment/Plan     Assessment: This is a 57y o -year-old female with poorly controlled L3GT with complication of steroid induced hyperglycemia for COPD exacerbation  Endocrine consulted for diabetes management  Dose of steroid reduced from solumedrol 40mg every 6hrs to once a day now  Patient currently on insulin drip d/t hyperglycemia which most likely was d/t high dose steroids, patient not in DKA or HHNS and hence insulin drip not needed  D/c insulin drip  Start on lantus 50u daily and increase lispro to 30u with bk, 15u with lunch and dinner for now  Once dose of steroid adjustment, may need to reduce insulin doses then  Goal -180  Plan:  - d/c insulin drip   - c/w lantus 50u daily   - increase lispro 30u with bk, 15u with lunch/dinner  - c/w ISS  - goal -180   - Patient will resume dexcom once home, send BG logs in 1 week to office for review and see if further adjustment to insulin dose needed or not  - c/w metformin 1000mg BID and Ozempic on d/c per home regime    Inpatient consult to Endocrinology  Consult performed by: Trisha Ambrocio MD  Consult ordered by: Analias Colunga MD          CC: T2DM management    History of Present Illness     HPI: Víctor Valles is a 57y o  year old female with type 2DM with poor diabetes control on MDI with COPD who was admitted for COPD exacerbation  Endocrine consulted for diabetes management d/t poor baseline BG control and also steroid induced hyperglycemia needing insulin drip d/t BG >500 while on high dose steroids  Patient initially started on Solumedtrol 100mg daily, switched to 40mg every 6hrs until last 2 days causing hyperglycemia in 500's needing insulin drip ~12-15u/hr  Dose of solumedtrol reduced to 40mg daily from tomorrow with taper down 10mg every 3 days       Home regime- Lantus 30u, Humalog 24 w Bk, 12u with ln/Dn, metformin 1000mg BID,  victoza (WAS supposed to start Ozempic 0 5mg weekly this week)  Current regime- Lantus 30u, Humalog 24/12/12 with insulin drip at 12-15u/hr  HbA1C:- 05/23 9 9%  BG since last 24hrs:- ranging from  with highly labile insulin drip rates  Received lantus 50u yesterday AM and lispro 31u in AM d/t   Hence started on insulin drip  CMP AG 8, CO3- 36  Patient on solumedrol 40mg every 6hrs then  Patient currently reports feeling well, indicates BG had been high in 400 range even at home as patient was stress eating as she was depressed recently d/t decline in her respiratory status  Feels slightly better now and reports will do better with following low carb diet once goes home  Review of Systems   Constitutional: Negative for diaphoresis, fatigue and unexpected weight change  Eyes: Negative for photophobia and visual disturbance  Gastrointestinal: Negative for constipation, diarrhea and vomiting  Endocrine: Negative for polydipsia and polyuria  Skin: Negative  Historical Information   Past Medical History:   Diagnosis Date   • Acute blood loss anemia 6/1/2021   • Acute diverticulitis 5/2/2021   • Alcohol abuse 5/21/2020   • Anemia    • Atrial fibrillation [facility])    • Cervical radiculopathy    • CHF (congestive heart failure) (HCC)    • Chronic low back pain    • Chronic obstructive asthma ([facility] [address] ) 2/20/2018   • Cigarette nicotine dependence without complication 38/85/2892    Quit 12/10/2019      • Community acquired pneumonia 5/21/2020   • Depression with anxiety 3/9/2014   • Diabetes mellitus with hyperglycemia ([facility] [address] )    • Diverticulitis 6/6/2021   • Elevated liver enzymes    • GERD (gastroesophageal reflux disease)    • Hypersomnolence 10/28/2020   • Hypokalemia 12/4/2018   • Paresthesia of upper extremity    • Plantar fasciitis    • Restless legs syndrome 4/8/2014   • Sexual dysfunction    • Sleep apnea, obstructive    • Tenosynovitis of finger    • Tinea corporis    • Tobacco use 2018    Currently smoking 3-4 cigarettes daily   • Trigger middle finger of left hand 2017   • Trigger ring finger of left hand 2017   • Weakness of upper extremity      Past Surgical History:   Procedure Laterality Date   • ABDOMINAL SURGERY     • CARPAL TUNNEL RELEASE Bilateral    •  SECTION     • CHOLECYSTECTOMY      laparoscopic   • ESOPHAGOGASTRODUODENOSCOPY N/A 12/3/2018    Procedure: ESOPHAGOGASTRODUODENOSCOPY (EGD) AND COLONOSCOPY;  Surgeon: Sriram Robledo MD;  Location: [facility];  Service: Gastroenterology   • GASTRIC BYPASS      for morbid obesity with gilda en y   • HERNIA REPAIR     • HYSTERECTOMY     • TX EXCISION GANGLION WRIST DORSAL/VOLAR PRIMARY Left 2017    Procedure: LONG FINGER GANGLION CYST EXCISION;  Surgeon: Mj Hwang MD;  Location: [facility];  Service: Orthopedics   • TX TENDON SHEATH INCISION Left 2017    Procedure: Jennifer Patrick, RING, TRIGGER FINGER RELEASE;  Surgeon: Mj Hwang MD;  Location: [facility];  Service: Orthopedics   • SHOULDER SURGERY Right      Social History   Social History     Substance and Sexual Activity   Alcohol Use Not Currently   • Alcohol/week: 20 0 standard drinks of alcohol   • Types: 20 Cans of beer per week    Comment: about 6 coors light every night, stopped in      Social History     Substance and Sexual Activity   Drug Use No     Social History     Tobacco Use   Smoking Status Former   • Packs/day: 0 25   • Years: 29 00   • Total pack years: 7 25   • Types: Cigarettes   • Start date: 200   • Quit date: 12/10/2019   • Years since quitting: 3 4   Smokeless Tobacco Never     Family History:   Family History   Problem Relation Age of Onset   • Alzheimer's disease Mother    • Atrial fibrillation Mother    • Dementia Mother    • Heart disease Mother         heart problem   • Seizures Mother    • Parkinsonism Father    • Arthritis Father    • Atrial fibrillation Father    • No Known Problems Daughter    • Cri-du-chat syndrome Daughter    • Colon cancer Maternal Grandmother [de-identified]   • Diabetes type II Maternal Grandmother    • No Known Problems Brother    • No Known Problems Son    • Substance Abuse Neg Hx         mother,father   • Mental illness Neg Hx         mother,father   • Colon polyps Neg Hx        Meds/Allergies   Current Facility-Administered Medications   Medication Dose Route Frequency Provider Last Rate Last Admin   • acetaminophen (TYLENOL) tablet 650 mg  650 mg Oral Q6H PRN Laura Huggins MD   650 mg at 06/05/23 1047   • aluminum-magnesium hydroxide-simethicone (MYLANTA) oral suspension 30 mL  30 mL Oral Q6H PRN Laura Huggins MD       • apixaban (ELIQUIS) tablet 5 mg  5 mg Oral BID Laura Huggins MD   5 mg at 06/05/23 0936   • atorvastatin (LIPITOR) tablet 40 mg  40 mg Oral After Radha Garcia MD   40 mg at 06/04/23 1721   • budesonide (PULMICORT) inhalation solution 0 5 mg  0 5 mg Nebulization Q12H Laura Huggins MD   0 5 mg at 06/05/23 0747   • escitalopram (LEXAPRO) tablet 20 mg  20 mg Oral Daily Laura Huggins MD   20 mg at 06/05/23 0937   • fluticasone (FLONASE) 50 mcg/act nasal spray 1 spray  1 spray Nasal BID Laura Huggins MD   1 spray at 06/05/23 0947   • formoterol (PERFOROMIST) nebulizer solution 20 mcg  20 mcg Nebulization Q12H Laura Huggins MD   20 mcg at 06/05/23 0747   • insulin glargine (LANTUS) subcutaneous injection 40 Units 0 4 mL  40 Units Subcutaneous HS Angel Colin MD       • [START ON 6/6/2023] insulin lispro (HumaLOG) 100 units/mL subcutaneous injection 12 Units  12 Units Subcutaneous Daily With Paul Bland MD       • [START ON 6/6/2023] insulin lispro (HumaLOG) 100 units/mL subcutaneous injection 15 Units  15 Units Subcutaneous Daily With Aileen Leventhal, MD       • [START ON 6/6/2023] insulin lispro (HumaLOG) 100 units/mL subcutaneous injection 29 Units  29 Units Subcutaneous Daily With Breakfast Angel Colin MD       • ipratropium (ATROVENT) 0 02 % inhalation solution 0 5 mg  0 5 mg Nebulization TID Analisa Colunga MD   0 5 mg at 06/05/23 0747   • levalbuterol (Charlotte Blizzard) inhalation solution 1 25 mg  1 25 mg Nebulization TID Analisa Colunga MD   1 25 mg at 06/05/23 0747   • loratadine (CLARITIN) tablet 10 mg  10 mg Oral Daily Analisa Colunga MD   10 mg at 06/05/23 2395   • LORazepam (ATIVAN) tablet 0 5 mg  0 5 mg Oral Q6H PRN Analisa Colunga MD       • LORazepam (ATIVAN) tablet 1 mg  1 mg Oral HS Analisa Colunga MD   1 mg at 06/04/23 2141   • [START ON 6/6/2023] methylPREDNISolone sodium succinate (Solu-MEDROL) injection 40 mg  40 mg Intravenous Daily Janine Magallanes MD       • metoprolol succinate (TOPROL-XL) 24 hr tablet 100 mg  100 mg Oral Daily Analisa Colunga MD   100 mg at 06/05/23 0936   • montelukast (SINGULAIR) tablet 10 mg  10 mg Oral HS Analisa Colunga MD   10 mg at 06/04/23 2141   • pantoprazole (PROTONIX) EC tablet 40 mg  40 mg Oral Daily Before Breakfast Analisa Colunga MD   40 mg at 06/05/23 6031   • potassium chloride (K-DUR,KLOR-CON) CR tablet 20 mEq  20 mEq Oral BID Analisa Colunga MD   20 mEq at 06/05/23 2842   • saccharomyces boulardii (FLORASTOR) capsule 250 mg  250 mg Oral BID Analisa Colunga MD   250 mg at 06/05/23 9145   • senna (SENOKOT) tablet 8 6 mg  1 tablet Oral HS PRN Analisa Colunga MD       • sodium chloride (PF) 0 9 % injection 3 mL  3 mL Intravenous Q8H Josh Mena MD   3 mL at 06/05/23 1437   • sodium chloride 0 9 % infusion  15 mL/hr Intravenous Continuous Analisa Colunga MD 15 mL/hr at 06/04/23 1405 15 mL/hr at 06/04/23 1405   • torsemide (DEMADEX) tablet 60 mg  60 mg Oral BID (diuretic) Analisa Colunga MD   60 mg at 06/05/23 6512   • traZODone (DESYREL) tablet 150 mg  150 mg Oral HS Analisa Colunga MD   150 mg at 06/04/23 2142     Allergies   Allergen Reactions   • Iron Dextran Swelling   • Januvia [Sitagliptin] Shortness Of Breath   • Jardiance [Empagliflozin] Shortness Of Breath   • Clonazepam Other "(See Comments)     Unknown reaction   • Codeine Itching and Other (See Comments)     itch;mary kay morphine,takes ultram @home   • Adhesive [Medical Tape] Itching     itching   • Effexor [Venlafaxine] Itching   • Lactose - Food Allergy GI Intolerance   • Oxycodone-Acetaminophen Anxiety   • Oxycodone-Acetaminophen Itching and Rash     Other reaction(s): Other (See Comments)  (PERCOCET) MILD RASH, not allergic to Acetaminophen        Objective   Vitals: Blood pressure 101/59, pulse 89, temperature 97 8 °F (36 6 °C), resp  rate 20, height 5' 3\" (1 6 m), weight 129 kg (284 lb), SpO2 (!) 86 %, not currently breastfeeding  Intake/Output Summary (Last 24 hours) at 6/5/2023 1452  Last data filed at 6/5/2023 1578  Gross per 24 hour   Intake 1000 ml   Output --   Net 1000 ml     Invasive Devices     Peripheral Intravenous Line  Duration           Peripheral IV 06/03/23 Left;Ventral (anterior) Forearm 1 day                Physical Exam  Constitutional:       Appearance: Normal appearance  She is obese  Cardiovascular:      Rate and Rhythm: Normal rate and regular rhythm  Pulses: Normal pulses  Pulmonary:      Effort: Pulmonary effort is normal    Abdominal:      General: Abdomen is flat  Palpations: Abdomen is soft  Musculoskeletal:      Cervical back: Normal range of motion and neck supple  Skin:     Capillary Refill: Capillary refill takes less than 2 seconds  Neurological:      General: No focal deficit present  Mental Status: She is alert and oriented to person, place, and time  Psychiatric:         Mood and Affect: Mood normal          The history was obtained from the review of the chart, patient      Lab Results:       Lab Results   Component Value Date    HCT 30 7 (L) 06/04/2023    HGB 9 2 (L) 06/04/2023    MCV 68 (L) 06/04/2023     06/04/2023    WBC 17 27 (H) 06/04/2023     Lab Results   Component Value Date/Time    ALB 4 3 06/02/2023 11:18 PM    ALB 4 2 09/22/2017 09:02 AM    ALT 37 " "06/02/2023 11:18 PM    ALT 21 05/11/2023 10:05 AM    AST 24 06/02/2023 11:18 PM    AST 20 05/11/2023 10:05 AM    BUN 18 06/04/2023 03:58 AM    BUN 15 05/11/2023 10:05 AM    CL 95 (L) 06/04/2023 03:58 AM    CL 91 (L) 05/11/2023 10:05 AM    CO2 36 (H) 06/04/2023 03:58 AM    CO2 31 (H) 05/11/2023 10:05 AM    CREATININE 0 63 06/04/2023 03:58 AM    CREATININE 0 54 (L) 09/22/2017 09:02 AM    GLOB 2 1 05/11/2023 10:05 AM    K 3 7 06/04/2023 03:58 AM    K 4 5 05/11/2023 10:05 AM     09/22/2017 09:02 AM    TP 7 6 06/02/2023 11:18 PM    TP 6 4 05/11/2023 10:05 AM     No results for input(s): \"CHOL\", \"HDL\", \"LDL\", \"TRIG\", \"VLDL\" in the last 72 hours  No results found for: \"ZVPT27YSR\", \"MICROALBUR\"  POC Glucose (mg/dl)   Date Value   06/05/2023 379 (H)   06/05/2023 101       Imaging Studies: I have personally reviewed pertinent reports  Portions of the record may have been created with voice recognition software     "

## 2023-06-05 NOTE — PLAN OF CARE
Problem: RESPIRATORY - ADULT  Goal: Achieves optimal ventilation and oxygenation  Description: INTERVENTIONS:  - Assess for changes in respiratory status  - Assess for changes in mentation and behavior  - Position to facilitate oxygenation and minimize respiratory effort  - Oxygen administered by appropriate delivery if ordered  - Initiate smoking cessation education as indicated  - Encourage broncho-pulmonary hygiene including cough, deep breathe, Incentive Spirometry  - Assess the need for suctioning and aspirate as needed  - Assess and instruct to report SOB or any respiratory difficulty  - Respiratory Therapy support as indicated  Outcome: Progressing     Problem: GASTROINTESTINAL - ADULT  Goal: Minimal or absence of nausea and/or vomiting  Description: INTERVENTIONS:  - Administer IV fluids if ordered to ensure adequate hydration  - Maintain NPO status until nausea and vomiting are resolved  - Nasogastric tube if ordered  - Administer ordered antiemetic medications as needed  - Provide nonpharmacologic comfort measures as appropriate  - Advance diet as tolerated, if ordered  - Consider nutrition services referral to assist patient with adequate nutrition and appropriate food choices  Outcome: Progressing  Goal: Maintains or returns to baseline bowel function  Description: INTERVENTIONS:  - Assess bowel function  - Encourage oral fluids to ensure adequate hydration  - Administer IV fluids if ordered to ensure adequate hydration  - Administer ordered medications as needed  - Encourage mobilization and activity  - Consider nutritional services referral to assist patient with adequate nutrition and appropriate food choices  Outcome: Progressing  Goal: Maintains adequate nutritional intake  Description: INTERVENTIONS:  - Monitor percentage of each meal consumed  - Identify factors contributing to decreased intake, treat as appropriate  - Assist with meals as needed  - Monitor I&O, weight, and lab values if indicated  - Obtain nutrition services referral as needed  Outcome: Progressing  Goal: Establish and maintain optimal ostomy function  Description: INTERVENTIONS:  - Assess bowel function  - Encourage oral fluids to ensure adequate hydration  - Administer IV fluids if ordered to ensure adequate hydration   - Administer ordered medications as needed  - Encourage mobilization and activity  - Nutrition services referral to assist patient with appropriate food choices  - Assess stoma site  - Consider wound care consult   Outcome: Progressing  Goal: Oral mucous membranes remain intact  Description: INTERVENTIONS  - Assess oral mucosa and hygiene practices  - Implement preventative oral hygiene regimen  - Implement oral medicated treatments as ordered  - Initiate Nutrition services referral as needed  Outcome: Progressing     Problem: PAIN - ADULT  Goal: Verbalizes/displays adequate comfort level or baseline comfort level  Description: Interventions:  - Encourage patient to monitor pain and request assistance  - Assess pain using appropriate pain scale  - Administer analgesics based on type and severity of pain and evaluate response  - Implement non-pharmacological measures as appropriate and evaluate response  - Consider cultural and social influences on pain and pain management  - Notify physician/advanced practitioner if interventions unsuccessful or patient reports new pain  Outcome: Progressing     Problem: INFECTION - ADULT  Goal: Absence or prevention of progression during hospitalization  Description: INTERVENTIONS:  - Assess and monitor for signs and symptoms of infection  - Monitor lab/diagnostic results  - Monitor all insertion sites, i e  indwelling lines, tubes, and drains  - Monitor endotracheal if appropriate and nasal secretions for changes in amount and color  - Neche appropriate cooling/warming therapies per order  - Administer medications as ordered  - Instruct and encourage patient and family to use good hand hygiene technique  - Identify and instruct in appropriate isolation precautions for identified infection/condition  Outcome: Progressing  Goal: Absence of fever/infection during neutropenic period  Description: INTERVENTIONS:  - Monitor WBC    Outcome: Progressing     Problem: SAFETY ADULT  Goal: Patient will remain free of falls  Description: INTERVENTIONS:  - Educate patient/family on patient safety including physical limitations  - Instruct patient to call for assistance with activity   - Consult OT/PT to assist with strengthening/mobility   - Keep Call bell within reach  - Keep bed low and locked with side rails adjusted as appropriate  - Keep care items and personal belongings within reach  - Initiate and maintain comfort rounds  - Make Fall Risk Sign visible to staff  - Offer Toileting every  Hours, in advance of need  - Initiate/Maintain alarm  - Obtain necessary fall risk management equipment:   - Apply yellow socks and bracelet for high fall risk patients  - Consider moving patient to room near nurses station  Outcome: Progressing  Goal: Maintain or return to baseline ADL function  Description: INTERVENTIONS:  -  Assess patient's ability to carry out ADLs; assess patient's baseline for ADL function and identify physical deficits which impact ability to perform ADLs (bathing, care of mouth/teeth, toileting, grooming, dressing, etc )  - Assess/evaluate cause of self-care deficits   - Assess range of motion  - Assess patient's mobility; develop plan if impaired  - Assess patient's need for assistive devices and provide as appropriate  - Encourage maximum independence but intervene and supervise when necessary  - Involve family in performance of ADLs  - Assess for home care needs following discharge   - Consider OT consult to assist with ADL evaluation and planning for discharge  - Provide patient education as appropriate  Outcome: Progressing  Goal: Maintains/Returns to pre admission functional level  Description: INTERVENTIONS:  - Perform BMAT or MOVE assessment daily    - Set and communicate daily mobility goal to care team and patient/family/caregiver  - Collaborate with rehabilitation services on mobility goals if consulted  - Perform Range of Motion  times a day  - Reposition patient every  hours  - Dangle patient  times a day  - Stand patient  times a day  - Ambulate patient  times a day  - Out of bed to chair  times a day   - Out of bed for meals  times a day  - Out of bed for toileting  - Record patient progress and toleration of activity level   Outcome: Progressing     Problem: DISCHARGE PLANNING  Goal: Discharge to home or other facility with appropriate resources  Description: INTERVENTIONS:  - Identify barriers to discharge w/patient and caregiver  - Arrange for needed discharge resources and transportation as appropriate  - Identify discharge learning needs (meds, wound care, etc )  - Arrange for interpretive services to assist at discharge as needed  - Refer to Case Management Department for coordinating discharge planning if the patient needs post-hospital services based on physician/advanced practitioner order or complex needs related to functional status, cognitive ability, or social support system  Outcome: Progressing     Problem: Knowledge Deficit  Goal: Patient/family/caregiver demonstrates understanding of disease process, treatment plan, medications, and discharge instructions  Description: Complete learning assessment and assess knowledge base  Interventions:  - Provide teaching at level of understanding  - Provide teaching via preferred learning methods  Outcome: Progressing     Problem: Nutrition/Hydration-ADULT  Goal: Nutrient/Hydration intake appropriate for improving, restoring or maintaining nutritional needs  Description: Monitor and assess patient's nutrition/hydration status for malnutrition   Collaborate with interdisciplinary team and initiate plan and interventions as ordered  Monitor patient's weight and dietary intake as ordered or per policy  Utilize nutrition screening tool and intervene as necessary  Determine patient's food preferences and provide high-protein, high-caloric foods as appropriate       INTERVENTIONS:  - Monitor oral intake, urinary output, labs, and treatment plans  - Assess nutrition and hydration status and recommend course of action  - Evaluate amount of meals eaten  - Assist patient with eating if necessary   - Allow adequate time for meals  - Recommend/ encourage appropriate diets, oral nutritional supplements, and vitamin/mineral supplements  - Order, calculate, and assess calorie counts as needed  - Recommend, monitor, and adjust tube feedings and TPN/PPN based on assessed needs  - Assess need for intravenous fluids  - Provide specific nutrition/hydration education as appropriate  - Include patient/family/caregiver in decisions related to nutrition  Outcome: Progressing

## 2023-06-06 ENCOUNTER — TRANSITIONAL CARE MANAGEMENT (OUTPATIENT)
Dept: FAMILY MEDICINE CLINIC | Facility: HOSPITAL | Age: 63
End: 2023-06-06

## 2023-06-06 VITALS
BODY MASS INDEX: 50.43 KG/M2 | TEMPERATURE: 97.8 F | DIASTOLIC BLOOD PRESSURE: 64 MMHG | SYSTOLIC BLOOD PRESSURE: 123 MMHG | RESPIRATION RATE: 15 BRPM | HEIGHT: 63 IN | WEIGHT: 284.6 LBS | OXYGEN SATURATION: 96 % | HEART RATE: 75 BPM

## 2023-06-06 LAB
ALBUMIN SERPL BCP-MCNC: 3.9 G/DL (ref 3.5–5)
ALP SERPL-CCNC: 112 U/L (ref 34–104)
ALT SERPL W P-5'-P-CCNC: 46 U/L (ref 7–52)
ANION GAP SERPL CALCULATED.3IONS-SCNC: 11 MMOL/L (ref 4–13)
AST SERPL W P-5'-P-CCNC: 40 U/L (ref 13–39)
BASOPHILS # BLD AUTO: 0.06 THOUSANDS/ÂΜL (ref 0–0.1)
BASOPHILS NFR BLD AUTO: 0 % (ref 0–1)
BILIRUB SERPL-MCNC: 0.42 MG/DL (ref 0.2–1)
BUN SERPL-MCNC: 22 MG/DL (ref 5–25)
C DIFF TOX GENS STL QL NAA+PROBE: NEGATIVE
CALCIUM SERPL-MCNC: 8.2 MG/DL (ref 8.4–10.2)
CAMPYLOBACTER DNA SPEC NAA+PROBE: NORMAL
CHLORIDE SERPL-SCNC: 93 MMOL/L (ref 96–108)
CO2 SERPL-SCNC: 32 MMOL/L (ref 21–32)
CREAT SERPL-MCNC: 0.72 MG/DL (ref 0.6–1.3)
EOSINOPHIL # BLD AUTO: 0 THOUSAND/ÂΜL (ref 0–0.61)
EOSINOPHIL NFR BLD AUTO: 0 % (ref 0–6)
ERYTHROCYTE [DISTWIDTH] IN BLOOD BY AUTOMATED COUNT: 18.4 % (ref 11.6–15.1)
GFR SERPL CREATININE-BSD FRML MDRD: 88 ML/MIN/1.73SQ M
GLUCOSE SERPL-MCNC: 223 MG/DL (ref 65–140)
GLUCOSE SERPL-MCNC: 243 MG/DL (ref 65–140)
GLUCOSE SERPL-MCNC: 309 MG/DL (ref 65–140)
HCT VFR BLD AUTO: 33.7 % (ref 34.8–46.1)
HGB BLD-MCNC: 9.9 G/DL (ref 11.5–15.4)
IMM GRANULOCYTES # BLD AUTO: 0.12 THOUSAND/UL (ref 0–0.2)
IMM GRANULOCYTES NFR BLD AUTO: 1 % (ref 0–2)
LYMPHOCYTES # BLD AUTO: 2.52 THOUSANDS/ÂΜL (ref 0.6–4.47)
LYMPHOCYTES NFR BLD AUTO: 12 % (ref 14–44)
MCH RBC QN AUTO: 20 PG (ref 26.8–34.3)
MCHC RBC AUTO-ENTMCNC: 29.4 G/DL (ref 31.4–37.4)
MCV RBC AUTO: 68 FL (ref 82–98)
MONOCYTES # BLD AUTO: 1.67 THOUSAND/ÂΜL (ref 0.17–1.22)
MONOCYTES NFR BLD AUTO: 8 % (ref 4–12)
NEUTROPHILS # BLD AUTO: 16.58 THOUSANDS/ÂΜL (ref 1.85–7.62)
NEUTS SEG NFR BLD AUTO: 79 % (ref 43–75)
NRBC BLD AUTO-RTO: 0 /100 WBCS
PLATELET # BLD AUTO: 391 THOUSANDS/UL (ref 149–390)
PMV BLD AUTO: 10.7 FL (ref 8.9–12.7)
POTASSIUM SERPL-SCNC: 3.6 MMOL/L (ref 3.5–5.3)
PROT SERPL-MCNC: 6.6 G/DL (ref 6.4–8.4)
RBC # BLD AUTO: 4.96 MILLION/UL (ref 3.81–5.12)
SALMONELLA DNA SPEC QL NAA+PROBE: NORMAL
SHIGA TOXIN STX GENE SPEC NAA+PROBE: NORMAL
SHIGELLA DNA SPEC QL NAA+PROBE: NORMAL
SODIUM SERPL-SCNC: 136 MMOL/L (ref 135–147)
WBC # BLD AUTO: 20.95 THOUSAND/UL (ref 4.31–10.16)

## 2023-06-06 PROCEDURE — 94660 CPAP INITIATION&MGMT: CPT

## 2023-06-06 PROCEDURE — 94640 AIRWAY INHALATION TREATMENT: CPT

## 2023-06-06 PROCEDURE — 85025 COMPLETE CBC W/AUTO DIFF WBC: CPT | Performed by: HOSPITALIST

## 2023-06-06 PROCEDURE — 99239 HOSP IP/OBS DSCHRG MGMT >30: CPT | Performed by: HOSPITALIST

## 2023-06-06 PROCEDURE — 94760 N-INVAS EAR/PLS OXIMETRY 1: CPT

## 2023-06-06 PROCEDURE — 82948 REAGENT STRIP/BLOOD GLUCOSE: CPT

## 2023-06-06 PROCEDURE — 80053 COMPREHEN METABOLIC PANEL: CPT | Performed by: HOSPITALIST

## 2023-06-06 RX ORDER — PREDNISONE 10 MG/1
TABLET ORAL
Qty: 30 TABLET | Refills: 0 | Status: SHIPPED | OUTPATIENT
Start: 2023-06-06 | End: 2023-06-18

## 2023-06-06 RX ADMIN — FLUTICASONE PROPIONATE 1 SPRAY: 50 SPRAY, METERED NASAL at 08:35

## 2023-06-06 RX ADMIN — ESCITALOPRAM OXALATE 20 MG: 20 TABLET ORAL at 08:24

## 2023-06-06 RX ADMIN — IPRATROPIUM BROMIDE 0.5 MG: 0.5 SOLUTION RESPIRATORY (INHALATION) at 07:36

## 2023-06-06 RX ADMIN — INSULIN LISPRO 30 UNITS: 100 INJECTION, SOLUTION INTRAVENOUS; SUBCUTANEOUS at 08:34

## 2023-06-06 RX ADMIN — LEVALBUTEROL HYDROCHLORIDE 1.25 MG: 1.25 SOLUTION RESPIRATORY (INHALATION) at 07:36

## 2023-06-06 RX ADMIN — FORMOTEROL FUMARATE DIHYDRATE 20 MCG: 20 SOLUTION RESPIRATORY (INHALATION) at 07:36

## 2023-06-06 RX ADMIN — APIXABAN 5 MG: 5 TABLET, FILM COATED ORAL at 08:26

## 2023-06-06 RX ADMIN — POTASSIUM CHLORIDE 20 MEQ: 1500 TABLET, EXTENDED RELEASE ORAL at 08:23

## 2023-06-06 RX ADMIN — METOPROLOL SUCCINATE 100 MG: 50 TABLET, EXTENDED RELEASE ORAL at 08:25

## 2023-06-06 RX ADMIN — SODIUM CHLORIDE, PRESERVATIVE FREE 3 ML: 5 INJECTION INTRAVENOUS at 06:19

## 2023-06-06 RX ADMIN — INSULIN LISPRO 15 UNITS: 100 INJECTION, SOLUTION INTRAVENOUS; SUBCUTANEOUS at 12:17

## 2023-06-06 RX ADMIN — Medication 250 MG: at 08:30

## 2023-06-06 RX ADMIN — PANTOPRAZOLE SODIUM 40 MG: 40 TABLET, DELAYED RELEASE ORAL at 06:19

## 2023-06-06 RX ADMIN — LORATADINE 10 MG: 10 TABLET ORAL at 08:23

## 2023-06-06 RX ADMIN — INSULIN LISPRO 2 UNITS: 100 INJECTION, SOLUTION INTRAVENOUS; SUBCUTANEOUS at 08:33

## 2023-06-06 RX ADMIN — METHYLPREDNISOLONE SODIUM SUCCINATE 40 MG: 40 INJECTION, POWDER, FOR SOLUTION INTRAMUSCULAR; INTRAVENOUS at 08:27

## 2023-06-06 RX ADMIN — INSULIN LISPRO 4 UNITS: 100 INJECTION, SOLUTION INTRAVENOUS; SUBCUTANEOUS at 12:16

## 2023-06-06 RX ADMIN — TORSEMIDE 60 MG: 20 TABLET ORAL at 08:23

## 2023-06-06 RX ADMIN — BUDESONIDE 0.5 MG: 0.5 INHALANT RESPIRATORY (INHALATION) at 07:36

## 2023-06-06 NOTE — DISCHARGE SUMMARY
[place]  Discharge- Karenann Body 1960, 57 y o  female MRN: [id number]  Unit/Bed#: -Joe Encounter: [id number]  Primary Care Provider: Lee Baca MD   Date and time admitted to hospital: 6/2/2023 10:40 PM    Hepatic steatosis  Assessment & Plan  · Noted on admission CT scan  · Patient without transaminitis and coags are normal, as well as total bilirubin, alk phos slightly elevated at 157  · Would benefit from GI referral on discharge    Morbid obesity ([address] )  Assessment & Plan  · Affects all aspects of care, especially her dyspnea  · Dietary and lifestyle changes recommended    Chronic respiratory failure with hypoxia and hypercapnia (HCC)  Assessment & Plan  · Wears 4 L supplemental oxygen via nasal cannula at baseline  · SPO2 stable on home oxygen    Lactic acidosis  Assessment & Plan  · Lactic acidosis secondary to albuterol nebulizer  · lactic acid still elevated IVF not needed to clear    Chronic diastolic congestive heart failure (HCC)  Assessment & Plan  Wt Readings from Last 3 Encounters:   06/06/23 129 kg (284 lb 9 6 oz)   05/17/23 130 kg (286 lb)   03/29/23 132 kg (290 lb)     · Last echo October 2022: LVEF 60%  G2 DD  No significant valvular disease  · Home regimen:  Torsemide 60 Mg twice daily  · Patient given Lasix 40 in the ED, however does not examine fluid overloaded and CT does not show pulmonary congestion  · Continue home torsemide  · I/os Daily weights  · Sodium and fluid restriction    Paroxysmal atrial fibrillation (HCC)  Assessment & Plan  · Home regimen: Metoprolol 100 Mg daily and anticoagulation with Eliquis 5 mg twice daily  · Rate controlled and in sinus rhythm on admission  · Continue home regimen    Type 2 diabetes mellitus with stage 2 chronic kidney disease, with long-term current use of insulin [facility])  Assessment & Plan  Lab Results   Component Value Date    HGBA1C 9 9 (H) 05/11/2023       Recent Labs     06/05/23  1548 06/05/23  1833 06/05/23  2110 06/06/23  0722   POCGLU 271* 337* >500* 223*       Blood Sugar Average: Last 72 hrs:  (P) 790 [id number]   · Home regimen: Lantus 30 units at bedtime, Humalog 24 units with breakfast and 12 units with lunch and dinner  · Transitioned oFF insulin gtt to home regimen  · ISS  · Hypoglycemic protocol    Obstructive sleep apnea  Assessment & Plan  · Utilizes CPAP at bedtime with AutoPap 12-20  · Continue    * Asthma with COPD with exacerbation ([facility] [address] )  Assessment & Plan  · Presents with acute worsening of dyspnea over the last 1 week, however states that her breathing has not been doing well over the last couple of months  · Home regimen: Bevespi twice daily -she is to use Pulmicort nebulizer twice daily, however her nebulizer machine broke so she has not done that for a while  · CT without focal consolidation, hold on further antibiotics for now  · Xopenex and Atrovent 3 times daily  · Pulmicort and Perforomist twice daily  · Solu-Medrol 40 Mg - > weaned to daily  Pred taper at MD  · Pulmonology following, apprec [facility]  Hospital Course:     Belen Fiore is a 57 y o  female patient who originally presented to the hospital on   Admission Orders (From admission, onward)     Ordered        06/03/23 0211  INPATIENT ADMISSION  Once                     due to combined asthma/COPD exacerbation  Patient was seen by pulmonary and had slow and steady improvement  She will complete a steroid taper at discharge  She has at her home oxygen requirements at the time of discharge without complaints  She briefly required insulin drip due to steroid-induced hyperglycemia  Please see above list of diagnoses and related plan for additional information       Physical Exam:    GEN: No acute distress, comfortable on o2  HEEENT: No JVD, PERRLA, no scleral icterus  RESP: Lungs clear to auscultation bilaterally  CV: RRR, +s1/s2   ABD: SOFT NON TENDER, POSITIVE BOWEL SOUNDS, NO DISTENTION  PSYCH: CALM  NEURO: [place] baseline, NO FOCAL DEFICITS  SKIN: NO RASH  EXTREM: NO EDEMA    CONSULTING PROVIDERS   IP CONSULT TO PULMONOLOGY  IP CONSULT TO ENDOCRINOLOGY    PROCEDURES PERFORMED  * No surgery found *    RADIOLOGY RESULTS  XR chest portable    Result Date: 6/3/2023  Narrative: CHEST INDICATION:   sob  COMPARISON: CXR 9/27/2022 and chest CT 6/3/2023  EXAM PERFORMED/VIEWS:  XR CHEST PORTABLE  FINDINGS: Mild cardiomegaly  Lungs clear  No effusion or pneumothorax  Upper abdomen normal  Bones normal for age  Impression: No acute cardiopulmonary disease  Workstation performed: [id number]     CT chest abdomen pelvis w contrast    Result Date: 6/3/2023  Narrative: CT CHEST, ABDOMEN AND PELVIS WITH IV CONTRAST INDICATION:   abdominal pain  COMPARISON: Multiple priors most recently chest radiograph 6/2/2023 TECHNIQUE: CT examination of the chest, abdomen and pelvis was performed  Multiplanar 2D reformatted images were created from the source data  This examination, like all CT scans performed in the [facility], was performed utilizing techniques to minimize radiation dose exposure, including the use of iterative reconstruction and automated exposure control  Radiation dose length product (DLP) for this visit:  3402 39 mGy-cm IV Contrast:  100 mL of iohexol (OMNIPAQUE) Enteric Contrast: Enteric contrast was not administered  FINDINGS: CHEST LUNGS: Scattered air trapping throughout the lungs, similar in appearance to multiple prior examinations  No focal consolidation or suspicious mass  Innumerable small pulmonary nodules measuring approximately 0 3 to 0 4 cm appear unchanged from at least 2020, benign  PLEURA:  Unremarkable  HEART/GREAT VESSELS: Heart is unremarkable for patient's age  No thoracic aortic aneurysm  There is enlargement of the central pulmonary arterial tree suggesting some element of pulmonary artery hypertension  MEDIASTINUM AND SHANTA:  Unremarkable   CHEST WALL AND LOWER NECK: Unremarkable  ABDOMEN LIVER/BILIARY TREE: Hepatomegaly  No suspicious hepatic lesion  There is heterogeneity of the hepatic parenchyma which is nonspecific, and may be related to underlying steatosis  GALLBLADDER:  No calcified gallstones  No pericholecystic inflammatory change  SPLEEN:  Unremarkable  PANCREAS: Atrophic  ADRENAL GLANDS:  Unremarkable  KIDNEYS/URETERS:  Unremarkable  No hydronephrosis  STOMACH AND BOWEL:  There is colonic diverticulosis without evidence of acute diverticulitis  Status post gastric bypass  APPENDIX:  A normal appendix was visualized  ABDOMINOPELVIC CAVITY:  No ascites  No pneumoperitoneum  No lymphadenopathy  VESSELS:  Unremarkable for patient's age  PELVIS REPRODUCTIVE ORGANS:  Surgical changes of prior hysterectomy  URINARY BLADDER:  Unremarkable  ABDOMINAL WALL/INGUINAL REGIONS:  Unremarkable  OSSEOUS STRUCTURES:  No acute fracture or destructive osseous lesion  Spinal degenerative changes are noted  Impression: Heterogeneous hepatic attenuation, may be secondary to steatosis  If there is concern for hepatitis, correlate with liver function and hepatitis testing  Mosaic attenuation throughout the lungs suggesting air trapping/small airway disease  This is similar to multiple prior examinations  The study was marked in [facility] for immediate notification   Workstation performed: [id number]       LABS  Results from last 7 days   Lab Units 06/06/23  0504 06/04/23  0358 06/03/23  1517 06/03/23  0522 06/02/23  2318   HEMATOCRIT % 33 7* 30 7* 33 7* 33 0* 35 3   HEMOGLOBIN g/dL 9 9* 9 2* 9 7* 9 7* 10 1*   INR   --   --   --   --  1 09   MCV fL 68* 68* 69* 69* 70*   PLATELETS Thousands/uL 391* 351 390 330 343   WBC Thousand/uL 20 95* 17 27* 15 73* 16 61* 14 38*     Results from last 7 days   Lab Units 06/06/23  0504 06/05/23  2143 06/04/23  0358 06/03/23  1517 06/03/23  1449 06/03/23  0522 06/02/23  2318   ALBUMIN g/dL 3 9  --   --   --   --   --  4 3   ALK PHOS U/L 112*  --   --   -- --   --  157*   ALT U/L 46  --   --   --   --   --  37   AST U/L 40*  --   --   --   --   --  24   BUN mg/dL 22 26* 18 18  --  17 16   CALCIUM mg/dL 8 2* 8 2* 8 8 9 3  --  8 7 9 3   CHLORIDE mmol/L 93* 89* 95* 89*  --  92* 92*   CO2 mmol/L 32 31 36* 31  --  32 35*   CREATININE mg/dL 0 72 0 94 0 63 0 85  --  0 77 0 76   EGFR ml/min/1 73sq m 88 64 95 72  --  81 83   GLUCOSE RANDOM mg/dL 243* 471* 159* 502* 528* 391* 222*   POTASSIUM mmol/L 3 6 4 3 3 7 4 5  --  4 1 3 8   SODIUM mmol/L 136 131* 139 132*  --  137 137   TOTAL BILIRUBIN mg/dL 0 42  --   --   --   --   --  0 39     Results from last 7 days   Lab Units 06/03/23  0031 06/02/23  2318   HS TNI 0HR ng/L  --  5   HS TNI 2HR ng/L 6  --           Results from last 7 days   Lab Units 06/02/23  2318   D-DIMER QUANTITATIVE ug/ml FEU <0 27     Results from last 7 days   Lab Units 06/06/23  0722 06/05/23  2110 06/05/23  1833 06/05/23  1548 06/05/23  1415 06/05/23  1227 06/05/23  1054 06/05/23  0909 06/05/23  0811 06/05/23  0717   POC GLUCOSE mg/dl 223* >500* 337* 271* 379* 101 187* 232* 79 95             Results from last 7 days   Lab Units 06/04/23  0358 06/03/23  1716 06/03/23  1517 06/03/23  0643 06/03/23  0522   LACTIC ACID mmol/L 2 4* 4 3* 4 7* 2 9* 4 0*   PROCALCITONIN ng/ml 0 06  --   --   --  0 06           Cultures:   Results from last 7 days   Lab Units 06/03/23  0038   BILIRUBIN UA  Negative   BLOOD UA  Negative   CLARITY UA  Clear   COLOR UA  Light Yellow   GLUCOSE UA mg/dl Negative   KETONES UA mg/dl Negative   LEUKOCYTES UA  Negative   NITRITE UA  Negative   PH UA  5 5   SPEC GRAV UA  1 010           Results from last 7 days   Lab Units 06/05/23  1006 06/03/23  0031 06/02/23  2318   BLOOD CULTURE   --  No Growth at 72 hrs  No Growth at 72 hrs     C DIFF TOXIN B BY PCR  Negative  --   --    INFLUENZA A PCR   --   --  Negative         Condition at Discharge:  good      Discharge instructions/Information to patient and family:   See after visit summary for information provided to patient and family  Provisions for Follow-Up Care:  See after visit summary for information related to follow-up care and any pertinent home health orders  Disposition:     Home       Discharge Statement:  I spent 33 minutes discharging the patient  This time was spent on the day of discharge  I had direct contact with the patient on the day of discharge  Greater than 50% of the total time was spent examining patient, answering all patient questions, arranging and discussing plan of care with patient as well as directly providing post-discharge instructions  Additional time then spent on discharge activities  Discharge Medications:  See after visit summary for reconciled discharge medications provided to patient and family        ** Please Note: This note has been constructed using a voice recognition system **

## 2023-06-06 NOTE — ASSESSMENT & PLAN NOTE
Lab Results   Component Value Date    HGBA1C 9 9 (H) 05/11/2023       Recent Labs     06/05/23  1548 06/05/23  1833 06/05/23  2110 06/06/23  0722   POCGLU 271* 337* >500* 223*       Blood Sugar Average: Last 72 hrs:  (P) 799 [id number]   · Home regimen: Lantus 30 units at bedtime, Humalog 24 units with breakfast and 12 units with lunch and dinner  · Transitioned oFF insulin gtt to home regimen    · ISS  · Hypoglycemic protocol

## 2023-06-06 NOTE — PLAN OF CARE
Problem: RESPIRATORY - ADULT  Goal: Achieves optimal ventilation and oxygenation  Description: INTERVENTIONS:  - Assess for changes in respiratory status  - Assess for changes in mentation and behavior  - Position to facilitate oxygenation and minimize respiratory effort  - Oxygen administered by appropriate delivery if ordered  - Initiate smoking cessation education as indicated  - Encourage broncho-pulmonary hygiene including cough, deep breathe, Incentive Spirometry  - Assess the need for suctioning and aspirate as needed  - Assess and instruct to report SOB or any respiratory difficulty  - Respiratory Therapy support as indicated  Outcome: Progressing     Problem: GASTROINTESTINAL - ADULT  Goal: Minimal or absence of nausea and/or vomiting  Description: INTERVENTIONS:  - Administer IV fluids if ordered to ensure adequate hydration  - Maintain NPO status until nausea and vomiting are resolved  - Nasogastric tube if ordered  - Administer ordered antiemetic medications as needed  - Provide nonpharmacologic comfort measures as appropriate  - Advance diet as tolerated, if ordered  - Consider nutrition services referral to assist patient with adequate nutrition and appropriate food choices  Outcome: Progressing  Goal: Maintains or returns to baseline bowel function  Description: INTERVENTIONS:  - Assess bowel function  - Encourage oral fluids to ensure adequate hydration  - Administer IV fluids if ordered to ensure adequate hydration  - Administer ordered medications as needed  - Encourage mobilization and activity  - Consider nutritional services referral to assist patient with adequate nutrition and appropriate food choices  Outcome: Progressing  Goal: Maintains adequate nutritional intake  Description: INTERVENTIONS:  - Monitor percentage of each meal consumed  - Identify factors contributing to decreased intake, treat as appropriate  - Assist with meals as needed  - Monitor I&O, weight, and lab values if indicated  - Obtain nutrition services referral as needed  Outcome: Progressing  Goal: Establish and maintain optimal ostomy function  Description: INTERVENTIONS:  - Assess bowel function  - Encourage oral fluids to ensure adequate hydration  - Administer IV fluids if ordered to ensure adequate hydration   - Administer ordered medications as needed  - Encourage mobilization and activity  - Nutrition services referral to assist patient with appropriate food choices  - Assess stoma site  - Consider wound care consult   Outcome: Progressing  Goal: Oral mucous membranes remain intact  Description: INTERVENTIONS  - Assess oral mucosa and hygiene practices  - Implement preventative oral hygiene regimen  - Implement oral medicated treatments as ordered  - Initiate Nutrition services referral as needed  Outcome: Progressing     Problem: PAIN - ADULT  Goal: Verbalizes/displays adequate comfort level or baseline comfort level  Description: Interventions:  - Encourage patient to monitor pain and request assistance  - Assess pain using appropriate pain scale  - Administer analgesics based on type and severity of pain and evaluate response  - Implement non-pharmacological measures as appropriate and evaluate response  - Consider cultural and social influences on pain and pain management  - Notify physician/advanced practitioner if interventions unsuccessful or patient reports new pain  Outcome: Progressing     Problem: INFECTION - ADULT  Goal: Absence or prevention of progression during hospitalization  Description: INTERVENTIONS:  - Assess and monitor for signs and symptoms of infection  - Monitor lab/diagnostic results  - Monitor all insertion sites, i e  indwelling lines, tubes, and drains  - Monitor endotracheal if appropriate and nasal secretions for changes in amount and color  - [place] appropriate cooling/warming therapies per order  - Administer medications as ordered  - Instruct and encourage patient and family to use good hand hygiene technique  - Identify and instruct in appropriate isolation precautions for identified infection/condition  Outcome: Progressing  Goal: Absence of fever/infection during neutropenic period  Description: INTERVENTIONS:  - Monitor WBC    Outcome: Progressing     Problem: SAFETY ADULT  Goal: Patient will remain free of falls  Description: INTERVENTIONS:  - Educate patient/family on patient safety including physical limitations  - Instruct patient to call for assistance with activity   - Consult OT/PT to assist with strengthening/mobility   - Keep Call bell within reach  - Keep bed low and locked with side rails adjusted as appropriate  - Keep care items and personal belongings within reach  - Initiate and maintain comfort rounds  - Make Fall Risk Sign visible to staff  - Offer Toileting every  Hours, in advance of need  - Initiate/Maintain alarm  - Obtain necessary fall risk management equipment:   - Apply yellow socks and bracelet for high fall risk patients  - Consider moving patient to room near nurses station  Outcome: Progressing  Goal: Maintain or return to baseline ADL function  Description: INTERVENTIONS:  -  Assess patient's ability to carry out ADLs; assess patient's baseline for ADL function and identify physical deficits which impact ability to perform ADLs (bathing, care of mouth/teeth, toileting, grooming, dressing, etc )  - Assess/evaluate cause of self-care deficits   - Assess range of motion  - Assess patient's mobility; develop plan if impaired  - Assess patient's need for assistive devices and provide as appropriate  - Encourage maximum independence but intervene and supervise when necessary  - Involve family in performance of ADLs  - Assess for home care needs following discharge   - Consider OT consult to assist with ADL evaluation and planning for discharge  - Provide patient education as appropriate  Outcome: Progressing  Goal: Maintains/Returns to pre admission functional level  Description: INTERVENTIONS:  - Perform BMAT or MOVE assessment daily    - Set and communicate daily mobility goal to care team and patient/family/caregiver  - Collaborate with rehabilitation services on mobility goals if consulted  - Perform Range of Motion  times a day  - Reposition patient every  hours  - Dangle patient  times a day  - Stand patient  times a day  - Ambulate patient  times a day  - Out of bed to chair  times a day   - Out of bed for meals  times a day  - Out of bed for toileting  - Record patient progress and toleration of activity level   Outcome: Progressing     Problem: DISCHARGE PLANNING  Goal: Discharge to home or other facility with appropriate resources  Description: INTERVENTIONS:  - Identify barriers to discharge w/patient and caregiver  - Arrange for needed discharge resources and transportation as appropriate  - Identify discharge learning needs (meds, wound care, etc )  - Arrange for interpretive services to assist at discharge as needed  - Refer to Case Management Department for coordinating discharge planning if the patient needs post-hospital services based on physician/advanced practitioner order or complex needs related to functional status, cognitive ability, or social support system  Outcome: Progressing     Problem: Knowledge Deficit  Goal: Patient/family/caregiver demonstrates understanding of disease process, treatment plan, medications, and discharge instructions  Description: Complete learning assessment and assess knowledge base  Interventions:  - Provide teaching at level of understanding  - Provide teaching via preferred learning methods  Outcome: Progressing     Problem: Nutrition/Hydration-ADULT  Goal: Nutrient/Hydration intake appropriate for improving, restoring or maintaining nutritional needs  Description: Monitor and assess patient's nutrition/hydration status for malnutrition   Collaborate with interdisciplinary team and initiate plan and interventions as ordered  Monitor patient's weight and dietary intake as ordered or per policy  Utilize nutrition screening tool and intervene as necessary  Determine patient's food preferences and provide high-protein, high-caloric foods as appropriate       INTERVENTIONS:  - Monitor oral intake, urinary output, labs, and treatment plans  - Assess nutrition and hydration status and recommend course of action  - Evaluate amount of meals eaten  - Assist patient with eating if necessary   - Allow adequate time for meals  - Recommend/ encourage appropriate diets, oral nutritional supplements, and vitamin/mineral supplements  - Order, calculate, and assess calorie counts as needed  - Recommend, monitor, and adjust tube feedings and TPN/PPN based on assessed needs  - Assess need for intravenous fluids  - Provide specific nutrition/hydration education as appropriate  - Include patient/family/caregiver in decisions related to nutrition  Outcome: Progressing     Problem: Potential for Falls  Goal: Patient will remain free of falls  Description: INTERVENTIONS:  - Educate patient/family on patient safety including physical limitations  - Instruct patient to call for assistance with activity   - Consult OT/PT to assist with strengthening/mobility   - Keep Call bell within reach  - Keep bed low and locked with side rails adjusted as appropriate  - Keep care items and personal belongings within reach  - Initiate and maintain comfort rounds  - Make Fall Risk Sign visible to staff  - Offer Toileting every  Hours, in advance of need  - Initiate/Maintain alarm  - Obtain necessary fall risk management equipment:   - Apply yellow socks and bracelet for high fall risk patients  - Consider moving patient to room near nurses station  Outcome: Progressing

## 2023-06-06 NOTE — CASE MANAGEMENT
Case Management Assessment & Discharge Planning Note    Patient name Natalia Rea  Location /-01 MRN [id number]  : 1960 Date 2023       Current Admission Date: 2023  Current Admission Diagnosis:Asthma with COPD with exacerbation [facility])   Patient Active Problem List    Diagnosis Date Noted   • Hepatic steatosis 2023   • Morbid obesity ([facility] [address] ) 02/15/2023   • Diabetic polyneuropathy associated with type 2 diabetes mellitus ([address] ) 11/15/2021   • Type 2 diabetes mellitus with hyperglycemia ([facility] ) 2021   • Tubular adenoma of colon 2021   • Lower gastrointestinal bleeding 2021   • Transaminitis 2021   • Gastric polyp 2021   • Class 3 severe obesity in adult [facility]) 2021   • Pulmonary hypertension ([facility] [address] ) 2020   • Asthma with COPD with exacerbation ([facility] [address] ) 2020   • Chronic respiratory failure with hypoxia and hypercapnia ([facility] [address] ) 2020   • Insomnia 10/28/2020   • Abnormal CT of the chest 2020   • Asthma-COPD overlap syndrome ([facility] [address] ) 2020   • Lactic acidosis 2020   • Chronic diastolic congestive heart failure ([facility] [address] ) 2020   • Microcytic anemia 2020   • Neck pain, chronic 2019   • Current moderate episode of major depressive disorder without prior episode ([facility] [address] ) 2019   • Paroxysmal atrial fibrillation ([facility] [address] ) 2018   • Severe persistent asthma 2018   • Chronic respiratory failure with hypoxia ([facility] [address] [address] ) 2018   • Abnormal computed tomography angiography (CTA) of abdomen 2018   • Abnormal CT of the abdomen 2018   • Iron deficiency anemia due to chronic blood loss 2018   • Type 2 diabetes mellitus with stage 2 chronic kidney disease, with long-term current use of insulin ([facility] [address] )    • Severe persistent asthma with exacerbation 2018   • Ganglion cyst of flexor tendon sheath 2017   • Paresthesia of upper extremity 2017   • Cervical radiculopathy 2017   • Elevated liver enzymes 12/30/2015   • Sexual dysfunction 11/11/2014   • Chronic low back pain 10/15/2014   • Hypercholesterolemia 09/09/2014   • Lumbar radiculopathy 09/09/2014   • Esophageal reflux 06/23/2014   • Hypertensive heart and chronic kidney disease with heart failure and stage 1 through stage 4 chronic kidney disease, or chronic kidney disease ([facility] [address] [facility] ) 03/24/2014   • Obstructive sleep apnea 03/09/2014      LOS (days): 3  Geometric Mean LOS (GMLOS) (days):   Days to GMLOS:     OBJECTIVE:    Risk of Unplanned Readmission Score: 21 62         Current admission status: Inpatient       Preferred Pharmacy:   [address] [place], [place] [address] [address]  Phone: [phone] Fax: [phone]    Primary Care Provider: Latoya Park MD    Primary Insurance: [employer]  Secondary Insurance:     ASSESSMENT:  [facility], [address] Representative - Daughter   Primary Phone: [phone] (Mobile)               Readmission Root Cause  30 Day Readmission: No    Patient Information  Admitted from[de-identified] Home  Mental Status: Alert  During Assessment patient was accompanied by: Not accompanied during assessment  Assessment information provided by[de-identified] Patient  Primary Caregiver: Self  Support Systems: Family members  Home entry access options   Select all that apply : No steps to enter home  Type of Current Residence: [place]  In the last 12 months, was there a time when you were not able to pay the mortgage or rent on time?: No  In the last 12 months, how many places have you lived?: 1  In the last 12 months, was there a time when you did not have a steady place to sleep or slept in a shelter (including now)?: No  Homeless/housing insecurity resource given?: N/A  Living Arrangements: Lives Alone    Activities of Daily Living Prior to Admission  Functional Status: Independent  Completes ADLs independently?: Yes  Ambulates independently?: Yes  Does patient use assisted devices?: Yes  Assisted Devices (DME) used: Home Oxygen concentrator, CPAP  DME Company Name (respiratory supplies):  [employer]  O2 Rate(s): 4L 02  Does patient currently own DME?: Yes  What DME does the patient currently own?: Home Oxygen concentrator, CPAP  Does patient have a history of Outpatient Therapy (PT/OT)?: No  Does the patient have a history of Short-Term Rehab?: No  Does patient have a history of HHC?: No  Does patient currently have [facility]?: No    Patient Information Continued  Does patient have prescription coverage?: Yes  Within the past 12 months, you worried that your food would run out before you got the money to buy more : Never true  Within the past 12 months, the food you bought just didn't last and you didn't have money to get more : Never true  Food insecurity resource given?: N/A  Does patient receive dialysis treatments?: No  Does patient have a history of substance abuse?: No  Does patient have a history of Mental Health Diagnosis?: No    Means of Transportation  Means of Transport to Appts[de-identified] Drives Self  In the past 12 months, has lack of transportation kept you from medical appointments or from getting medications?: No  In the past 12 months, has lack of transportation kept you from meetings, work, or from getting things needed for daily living?: No  Was application for public transport provided?: N/A    DISCHARGE DETAILS:    Discharge planning discussed with[de-identified] patient  Freedom of Choice: Yes  Comments - Freedom of Choice: no anticipated dc needs  CM contacted family/caregiver?: No- see comments (declined; reports being in contact with family)  Were Treatment Team discharge recommendations reviewed with patient/caregiver?: Yes  Did patient/caregiver verbalize understanding of patient care needs?: Yes  Were patient/caregiver advised of the risks associated with not following Treatment Team discharge recommendations?: Yes    [address] [facility] Is the patient interested in [facility] at discharge?: No    DME Referral Provided  Referral made for DME?: No    Treatment Team Recommendation: Home  Discharge Destination Plan[de-identified] Home  Transport at Discharge : Family     Additional Comments: Met with pt to discuss the role of CM and to discuss any help pt may need prior to dc  Pt lives alone in a ranch home with no NICOLA  P tperformed ADL's indptly pta, pt has a CPAP and home 02 through [employer]  Pt is on 4L 04 at baseline  No hx of HHC or rehab  No hx od D&A treatment  Pt reports hx of depression and is treated through her PCP; no IP treatment  Pt's preferred [facility] is [facility] [address]  Pt drives  Pt's friend Gregory Louise will transport home at [facility]  Pt reports having difficult with [employer] and receiving supplies for her 02  CM made [address] aware of same   Outpt CM message sent to Juliano Park

## 2023-06-06 NOTE — CASE MANAGEMENT
Case Management Progress Note    Patient name Natalia Rea  Location /-01 MRN [id number]  : 1960 Date 2023       LOS (days): 3  Geometric Mean LOS (GMLOS) (days):   Days to GMLOS:        OBJECTIVE:     Current admission status: Inpatient  Preferred Pharmacy:   [facility]/pharmacy #[id number] Briones - [address] [place] [address]  Phone: [phone] Fax: [phone]    Primary Care Provider: Lala Ferreira MD    Primary Insurance: [employer]  Secondary Insurance:     PROGRESS NOTE:    Notification made to OP CM Handoff: [facility] OP CM regarding discharge planning and disposition

## 2023-06-06 NOTE — PLAN OF CARE
Problem: RESPIRATORY - ADULT  Goal: Achieves optimal ventilation and oxygenation  Description: INTERVENTIONS:  - Assess for changes in respiratory status  - Assess for changes in mentation and behavior  - Position to facilitate oxygenation and minimize respiratory effort  - Oxygen administered by appropriate delivery if ordered  - Initiate smoking cessation education as indicated  - Encourage broncho-pulmonary hygiene including cough, deep breathe, Incentive Spirometry  - Assess the need for suctioning and aspirate as needed  - Assess and instruct to report SOB or any respiratory difficulty  - Respiratory Therapy support as indicated  Outcome: Progressing     Problem: GASTROINTESTINAL - ADULT  Goal: Minimal or absence of nausea and/or vomiting  Description: INTERVENTIONS:  - Administer IV fluids if ordered to ensure adequate hydration  - Maintain NPO status until nausea and vomiting are resolved  - Nasogastric tube if ordered  - Administer ordered antiemetic medications as needed  - Provide nonpharmacologic comfort measures as appropriate  - Advance diet as tolerated, if ordered  - Consider nutrition services referral to assist patient with adequate nutrition and appropriate food choices  Outcome: Progressing  Goal: Maintains or returns to baseline bowel function  Description: INTERVENTIONS:  - Assess bowel function  - Encourage oral fluids to ensure adequate hydration  - Administer IV fluids if ordered to ensure adequate hydration  - Administer ordered medications as needed  - Encourage mobilization and activity  - Consider nutritional services referral to assist patient with adequate nutrition and appropriate food choices  Outcome: Progressing  Goal: Maintains adequate nutritional intake  Description: INTERVENTIONS:  - Monitor percentage of each meal consumed  - Identify factors contributing to decreased intake, treat as appropriate  - Assist with meals as needed  - Monitor I&O, weight, and lab values if indicated  - Obtain nutrition services referral as needed  Outcome: Progressing  Goal: Establish and maintain optimal ostomy function  Description: INTERVENTIONS:  - Assess bowel function  - Encourage oral fluids to ensure adequate hydration  - Administer IV fluids if ordered to ensure adequate hydration   - Administer ordered medications as needed  - Encourage mobilization and activity  - Nutrition services referral to assist patient with appropriate food choices  - Assess stoma site  - Consider wound care consult   Outcome: Progressing  Goal: Oral mucous membranes remain intact  Description: INTERVENTIONS  - Assess oral mucosa and hygiene practices  - Implement preventative oral hygiene regimen  - Implement oral medicated treatments as ordered  - Initiate Nutrition services referral as needed  Outcome: Progressing     Problem: PAIN - ADULT  Goal: Verbalizes/displays adequate comfort level or baseline comfort level  Description: Interventions:  - Encourage patient to monitor pain and request assistance  - Assess pain using appropriate pain scale  - Administer analgesics based on type and severity of pain and evaluate response  - Implement non-pharmacological measures as appropriate and evaluate response  - Consider cultural and social influences on pain and pain management  - Notify physician/advanced practitioner if interventions unsuccessful or patient reports new pain  Outcome: Progressing     Problem: INFECTION - ADULT  Goal: Absence or prevention of progression during hospitalization  Description: INTERVENTIONS:  - Assess and monitor for signs and symptoms of infection  - Monitor lab/diagnostic results  - Monitor all insertion sites, i e  indwelling lines, tubes, and drains  - Monitor endotracheal if appropriate and nasal secretions for changes in amount and color  - [place] appropriate cooling/warming therapies per order  - Administer medications as ordered  - Instruct and encourage patient and family to use good hand hygiene technique  - Identify and instruct in appropriate isolation precautions for identified infection/condition  Outcome: Progressing  Goal: Absence of fever/infection during neutropenic period  Description: INTERVENTIONS:  - Monitor WBC    Outcome: Progressing     Problem: SAFETY ADULT  Goal: Patient will remain free of falls  Description: INTERVENTIONS:  - Educate patient/family on patient safety including physical limitations  - Instruct patient to call for assistance with activity   - Consult OT/PT to assist with strengthening/mobility   - Keep Call bell within reach  - Keep bed low and locked with side rails adjusted as appropriate  - Keep care items and personal belongings within reach  - Initiate and maintain comfort rounds  - Make Fall Risk Sign visible to staff  - Offer Toileting every  Hours, in advance of need  - Initiate/Maintain alarm  - Obtain necessary fall risk management equipment:   - Apply yellow socks and bracelet for high fall risk patients  - Consider moving patient to room near nurses station  Outcome: Progressing  Goal: Maintain or return to baseline ADL function  Description: INTERVENTIONS:  -  Assess patient's ability to carry out ADLs; assess patient's baseline for ADL function and identify physical deficits which impact ability to perform ADLs (bathing, care of mouth/teeth, toileting, grooming, dressing, etc )  - Assess/evaluate cause of self-care deficits   - Assess range of motion  - Assess patient's mobility; develop plan if impaired  - Assess patient's need for assistive devices and provide as appropriate  - Encourage maximum independence but intervene and supervise when necessary  - Involve family in performance of ADLs  - Assess for home care needs following discharge   - Consider OT consult to assist with ADL evaluation and planning for discharge  - Provide patient education as appropriate  Outcome: Progressing  Goal: Maintains/Returns to pre admission functional level  Description: INTERVENTIONS:  - Perform BMAT or MOVE assessment daily    - Set and communicate daily mobility goal to care team and patient/family/caregiver  - Collaborate with rehabilitation services on mobility goals if consulted  - Perform Range of Motion  times a day  - Reposition patient every  hours  - Dangle patient  times a day  - Stand patient  times a day  - Ambulate patient  times a day  - Out of bed to chair  times a day   - Out of bed for meals times a day  - Out of bed for toileting  - Record patient progress and toleration of activity level   Outcome: Progressing     Problem: DISCHARGE PLANNING  Goal: Discharge to home or other facility with appropriate resources  Description: INTERVENTIONS:  - Identify barriers to discharge w/patient and caregiver  - Arrange for needed discharge resources and transportation as appropriate  - Identify discharge learning needs (meds, wound care, etc )  - Arrange for interpretive services to assist at discharge as needed  - Refer to Case Management Department for coordinating discharge planning if the patient needs post-hospital services based on physician/advanced practitioner order or complex needs related to functional status, cognitive ability, or social support system  Outcome: Progressing     Problem: Knowledge Deficit  Goal: Patient/family/caregiver demonstrates understanding of disease process, treatment plan, medications, and discharge instructions  Description: Complete learning assessment and assess knowledge base  Interventions:  - Provide teaching at level of understanding  - Provide teaching via preferred learning methods  Outcome: Progressing     Problem: Nutrition/Hydration-ADULT  Goal: Nutrient/Hydration intake appropriate for improving, restoring or maintaining nutritional needs  Description: Monitor and assess patient's nutrition/hydration status for malnutrition   Collaborate with interdisciplinary team and initiate plan and interventions as ordered  Monitor patient's weight and dietary intake as ordered or per policy  Utilize nutrition screening tool and intervene as necessary  Determine patient's food preferences and provide high-protein, high-caloric foods as appropriate       INTERVENTIONS:  - Monitor oral intake, urinary output, labs, and treatment plans  - Assess nutrition and hydration status and recommend course of action  - Evaluate amount of meals eaten  - Assist patient with eating if necessary   - Allow adequate time for meals  - Recommend/ encourage appropriate diets, oral nutritional supplements, and vitamin/mineral supplements  - Order, calculate, and assess calorie counts as needed  - Recommend, monitor, and adjust tube feedings and TPN/PPN based on assessed needs  - Assess need for intravenous fluids  - Provide specific nutrition/hydration education as appropriate  - Include patient/family/caregiver in decisions related to nutrition  Outcome: Progressing

## 2023-06-06 NOTE — NURSING NOTE
All discharge paperwork, medications and follow-up appointments reviewed with patient  Patient verbalized understanding  Her friend will pick her up after lunch  Patient resting in chair  No distress noted

## 2023-06-06 NOTE — ASSESSMENT & PLAN NOTE
Wt Readings from Last 3 Encounters:   06/06/23 129 kg (284 lb 9 6 oz)   05/17/23 130 kg (286 lb)   03/29/23 132 kg (290 lb)     · Last echo October 2022: LVEF 60%  G2 DD  No significant valvular disease  · Home regimen:  Torsemide 60 Mg twice daily  · Patient given Lasix 40 in the ED, however does not examine fluid overloaded and CT does not show pulmonary congestion  · Continue home torsemide  · I/os Daily weights  · Sodium and fluid restriction

## 2023-06-06 NOTE — ASSESSMENT & PLAN NOTE
· Presents with acute worsening of dyspnea over the last 1 week, however states that her breathing has not been doing well over the last couple of months  · Home regimen: Bevespi twice daily -she is to use Pulmicort nebulizer twice daily, however her nebulizer machine broke so she has not done that for a while  · CT without focal consolidation, hold on further antibiotics for now  · Xopenex and Atrovent 3 times daily  · Pulmicort and Perforomist twice daily  · Solu-Medrol 40 Mg - > weaned to daily  Pred taper at dc  · Pulmonology following, apprec recs

## 2023-06-07 ENCOUNTER — PATIENT OUTREACH (OUTPATIENT)
Dept: FAMILY MEDICINE CLINIC | Facility: HOSPITAL | Age: 63
End: 2023-06-07

## 2023-06-07 DIAGNOSIS — F32.1 CURRENT MODERATE EPISODE OF MAJOR DEPRESSIVE DISORDER WITHOUT PRIOR EPISODE (HCC): ICD-10-CM

## 2023-06-07 NOTE — UTILIZATION REVIEW
NOTIFICATION OF ADMISSION DISCHARGE   This is a Notification of Discharge from [address]  Please be advised that this patient has been discharge from our facility  Below you will find the admission and discharge date and time including the patient’s disposition  UTILIZATION REVIEW CONTACT:  Chelly Ordoñez  Utilization   Network Utilization Review Department  Phone: [phone] carefully listen to the prompts  All voicemails are confidential   Email: [email or web address]     ADMISSION INFORMATION  PRESENTATION DATE: 6/2/2023 10:40 PM  OBERVATION ADMISSION DATE:   INPATIENT ADMISSION DATE: 6/3/23  2:11 AM   DISCHARGE DATE: 6/6/2023  1:22 PM   DISPOSITION:Home/Self Care    IMPORTANT INFORMATION:  Send all requests for admission clinical reviews, approved or denied determinations and any other requests to dedicated fax number below belonging to the campus where the patient is receiving treatment   List of dedicated fax numbers:  [address] DENIALS (Administrative/Medical Necessity) [phone]   [address] (Maternity/NICU/Pediatrics) [phone]   [facility] [phone]   [place] [address] [phone]   [facility] [phone]   [facility] [phone]   [address] [place] [address] [phone]   [address] [place] [address] 119 [phone]   [facility]  [phone]   [facility] [phone]   [facility] [address] [facility] [phone]

## 2023-06-07 NOTE — PROGRESS NOTES
Outpatient Care Management Note:    ADT alert received that patient was discharged from the hospital      Care manager called Ivy  She was using her nebulizer and requested I call back  She did note that her breathing is improved  Care manager called Ivy back  She is using her nebulizer as ordered, and feels that her breathing is improved  When I calked, she was resting and had her CPAP on  She is also using her oxygen at 4 L     Ivy declined completing a med review  CM did review the med changes per hospital AVS      Ivy states that her blood sugars are ranging from 230-400 due to her prednisone  We reviewed her post hospital discharge insulin instructions  (Lantus 30 units at bedtime: Apidra 24 units with breakfast and 12 units with lunch and dinner  CM instructed Ivy to call endocrine if she continues to have elevated blood sugars as she may need her insulin dose adjusted while on steroids  She agreed to this plan  Ivy did note that she has her ozempic but did not start it  She will start the weekly dosing  Ivy states she did not check her weight  Cm reviewed why we check daily weights  I strongly encouraged her to check her weights if she feels safe to get on a scale  She knows to call cardiology if her weight increases by 3 lb in 1 day or 5 lb in 1 week  CM reviewed that Ivy still needs to schedule a follow up with GI  Ivy has my contact information and will call with any questions

## 2023-06-08 ENCOUNTER — OFFICE VISIT (OUTPATIENT)
Dept: PULMONOLOGY | Facility: HOSPITAL | Age: 63
End: 2023-06-08
Payer: COMMERCIAL

## 2023-06-08 ENCOUNTER — TELEPHONE (OUTPATIENT)
Dept: PULMONOLOGY | Facility: HOSPITAL | Age: 63
End: 2023-06-08

## 2023-06-08 VITALS
HEART RATE: 68 BPM | HEIGHT: 63 IN | OXYGEN SATURATION: 94 % | WEIGHT: 284 LBS | TEMPERATURE: 97.6 F | BODY MASS INDEX: 50.32 KG/M2 | DIASTOLIC BLOOD PRESSURE: 64 MMHG | SYSTOLIC BLOOD PRESSURE: 112 MMHG | RESPIRATION RATE: 15 BRPM

## 2023-06-08 DIAGNOSIS — J96.11 CHRONIC RESPIRATORY FAILURE WITH HYPOXIA AND HYPERCAPNIA (HCC): ICD-10-CM

## 2023-06-08 DIAGNOSIS — I27.20 PULMONARY HYPERTENSION (HCC): ICD-10-CM

## 2023-06-08 DIAGNOSIS — J96.12 CHRONIC HYPERCAPNIC RESPIRATORY FAILURE (HCC): ICD-10-CM

## 2023-06-08 DIAGNOSIS — J96.12 CHRONIC RESPIRATORY FAILURE WITH HYPOXIA AND HYPERCAPNIA (HCC): ICD-10-CM

## 2023-06-08 DIAGNOSIS — J96.11 CHRONIC RESPIRATORY FAILURE WITH HYPOXIA (HCC): Primary | ICD-10-CM

## 2023-06-08 DIAGNOSIS — J45.51 SEVERE PERSISTENT ASTHMA WITH EXACERBATION: ICD-10-CM

## 2023-06-08 DIAGNOSIS — G47.33 OBSTRUCTIVE SLEEP APNEA: ICD-10-CM

## 2023-06-08 DIAGNOSIS — J44.9 COPD, SEVERE (HCC): ICD-10-CM

## 2023-06-08 DIAGNOSIS — J82.83 EOSINOPHILIC ASTHMA: ICD-10-CM

## 2023-06-08 DIAGNOSIS — J44.9 ASTHMA-COPD OVERLAP SYNDROME (HCC): ICD-10-CM

## 2023-06-08 DIAGNOSIS — E66.01 CLASS 3 SEVERE OBESITY WITHOUT SERIOUS COMORBIDITY WITH BODY MASS INDEX (BMI) OF 50.0 TO 59.9 IN ADULT, UNSPECIFIED OBESITY TYPE (HCC): ICD-10-CM

## 2023-06-08 LAB
BACTERIA BLD CULT: NORMAL
BACTERIA BLD CULT: NORMAL

## 2023-06-08 PROCEDURE — 99215 OFFICE O/P EST HI 40 MIN: CPT | Performed by: INTERNAL MEDICINE

## 2023-06-08 RX ORDER — ALBUTEROL SULFATE 90 UG/1
2 AEROSOL, METERED RESPIRATORY (INHALATION) EVERY 4 HOURS PRN
Qty: 8.5 G | Refills: 5 | Status: SHIPPED | OUTPATIENT
Start: 2023-06-08

## 2023-06-08 RX ORDER — ALBUTEROL SULFATE 2.5 MG/3ML
2.5 SOLUTION RESPIRATORY (INHALATION) EVERY 6 HOURS PRN
Qty: 1080 ML | Refills: 3 | Status: SHIPPED | OUTPATIENT
Start: 2023-06-08

## 2023-06-08 RX ORDER — BUDESONIDE 0.5 MG/2ML
0.5 INHALANT ORAL 2 TIMES DAILY
Qty: 60 ML | Refills: 3 | Status: SHIPPED | OUTPATIENT
Start: 2023-06-08

## 2023-06-08 RX ORDER — ESCITALOPRAM OXALATE 20 MG/1
TABLET ORAL
Qty: 90 TABLET | Refills: 0 | Status: SHIPPED | OUTPATIENT
Start: 2023-06-08

## 2023-06-08 NOTE — ASSESSMENT & PLAN NOTE
She is still on steroid taper due to recent exacerbation  Continue inhalers/nebulizer therapy as detailed above  [employer]

## 2023-06-08 NOTE — ASSESSMENT & PLAN NOTE
Group 2 and 3, she is adherent to using torsemide 60 mg twice daily and follows up with cardiology and

## 2023-06-08 NOTE — TELEPHONE ENCOUNTER
Scheduled patient sleep study on July 25th at [address] in [place]  Left message for patient that if this appointment date and time is not convenient for her to please call Sleep Medicine at [phone] to reschedule

## 2023-06-08 NOTE — ASSESSMENT & PLAN NOTE
Severe persistent asthma has been on Fasenra, 2 weeks prior to her latest hospital admission she ran out of the injections and she stopped using it  I reviewed her prescription today sent to her pharmacy  Overall she continues to be on the same inhalation regimens with albuterol nebulizer and inhaler, Pulmicort, Bevespi

## 2023-06-08 NOTE — PROGRESS NOTES
Pulmonary/Sleep Follow Up Note   Daryl May 57 y o  female MRN: [id number]  6/8/2023      Assessment and Plan:    1  Chronic respiratory failure with hypoxia ([facility])  Assessment & Plan:  She is continues oxygen 24/7    Orders:  -     Sleep BIPAP W/ transcutaneous; Future    2  Chronic hypercapnic respiratory failure ([facility] )  Assessment & Plan: With recent exacerbation she is currently on 4 L nasal cannula at baseline 24/7    Orders:  -     Sleep BIPAP W/ transcutaneous; Future    3  Obstructive sleep apnea  Assessment & Plan:  Has been on APAP 12 to 20 cm H2O with excellent compliance she is very sleepy still with evidence of recurrent hypercapnic respiratory failure I would think she would benefit more from BiPAP with transcutaneous capnography monitoring to make sure and confirmed that her hypoventilation has been treated well    Orders:  -     Sleep BIPAP W/ transcutaneous; Future    4  Asthma-COPD overlap syndrome ([place] [address] )  Assessment & Plan:  She is still on steroid taper due to recent exacerbation  Continue inhalers/nebulizer therapy as detailed above  Restart Fasenra    Orders:  -     Benralizumab 30 MG/ML SOAJ; Inject 1 mL under the skin every 28 days  -     glycopyrrolate-formoterol (BEVESPI AEROSPHERE) 9-4 8 MCG/ACT inhaler; Inhale 2 puffs 2 (two) times a day  -     budesonide (PULMICORT) 0 5 mg/2 mL nebulizer solution; Take 2 mL (0 5 mg total) by nebulization 2 (two) times a day Rinse mouth after use  -     albuterol (PROVENTIL HFA,VENTOLIN HFA) 90 mcg/act inhaler; Inhale 2 puffs every 4 (four) hours as needed for wheezing    5  Eosinophilic asthma  -     Benralizumab 30 MG/ML SOAJ; Inject 1 mL under the skin every 28 days    6  COPD, severe ([facility] )  -     albuterol (2 5 mg/3 mL) 0 083 % nebulizer solution; Take 3 mL (2 5 mg total) by nebulization every 6 (six) hours as needed for wheezing or shortness of breath    7   Severe persistent asthma with exacerbation  Assessment & Plan:  Severe persistent asthma has been on Fasenra, 2 weeks prior to her latest hospital admission she ran out of the injections and she stopped using it  I reviewed her prescription today sent to her pharmacy  Overall she continues to be on the same inhalation regimens with albuterol nebulizer and inhaler, Pulmicort, Bevespi        8  Chronic respiratory failure with hypoxia and hypercapnia (HCC)  Assessment & Plan: With recent exacerbation she is currently on 4 L nasal cannula at baseline 24/7      9  Pulmonary hypertension ([address] )  Assessment & Plan:  Group 2 and 3, she is adherent to using torsemide 60 mg twice daily and follows up with cardiology and      10  Class 3 severe obesity without serious comorbidity with body mass index (BMI) of 50 0 to 59 9 in adult, unspecified obesity type [facility])  Assessment & Plan: With hypoventilation, BiPAP with transcutaneous capnography start          Return in about 3 months (around 9/8/2023)  History of Present Illness   HPI:  Gian Diehl is a 57 y o  female who is here for a follow-up status posthospitalization from June 2 to 6 due to acute on chronic hypoxic and hypercapnic respiratory failure with worsening shortness of breath secondary to COPD exacerbation and heart failure exacerbation  The patient was last seen prior to that in February of this year proven to have very severe COPD on PFTs she has been on [place] for asthma/COPD overlap syndrome with relative improvement however unfortunately she ran out of medication 2 weeks prior to the admission  She was started on steroids, completed a course of antibiotic in the hospitalization she still on steroid tapers with improvement of her symptoms  She does have maximum inhalation nebulizer therapy at home and she has been adherent to using them other than that her nebulizer kit has been broken  She also is on oxygen 4 L 24/7  Unfortunately, she is not a candidate for pulmonary valves or lung transplant due to morbid obesity      Review of Systems All other systems reviewed and are negative  Historical Information   Past Medical History:   Diagnosis Date   • Acute blood loss anemia 2021   • Acute diverticulitis 2021   • Alcohol abuse 2020   • Anemia    • Atrial fibrillation [facility])    • Cervical radiculopathy    • CHF (congestive heart failure) ([facility])    • Chronic low back pain    • Chronic obstructive asthma ([facility] ) 2018   • Cigarette nicotine dependence without complication     Quit 12/10/2019      • Community acquired pneumonia 2020   • Depression with anxiety 3/9/2014   • Diabetes mellitus with hyperglycemia ([facility] )    • Diverticulitis 2021   • Elevated liver enzymes    • GERD (gastroesophageal reflux disease)    • Hypersomnolence 10/28/2020   • Hypokalemia 2018   • Paresthesia of upper extremity    • Plantar fasciitis    • Restless legs syndrome 2014   • Sexual dysfunction    • Sleep apnea, obstructive    • Tenosynovitis of finger    • Tinea corporis    • Tobacco use 2018    Currently smoking 3-4 cigarettes daily   • Trigger middle finger of left hand 2017   • Trigger ring finger of left hand 2017   • Weakness of upper extremity      Past Surgical History:   Procedure Laterality Date   • ABDOMINAL SURGERY     • CARPAL TUNNEL RELEASE Bilateral    •  SECTION     • CHOLECYSTECTOMY      laparoscopic   • ESOPHAGOGASTRODUODENOSCOPY N/A 12/3/2018    Procedure: ESOPHAGOGASTRODUODENOSCOPY (EGD) AND COLONOSCOPY;  Surgeon: Joseph Yoder MD;  Location:  MAIN OR;  Service: Gastroenterology   • GASTRIC BYPASS      for morbid obesity with gilda en y   • HERNIA REPAIR     • HYSTERECTOMY     • NH EXCISION GANGLION WRIST DORSAL/VOLAR PRIMARY Left 2017    Procedure: LONG FINGER GANGLION CYST EXCISION;  Surgeon: Eliud Guerra MD;  Location:  MAIN OR;  Service: Orthopedics   • NH TENDON SHEATH INCISION Left 2017    Procedure: Dave Ar, RING, TRIGGER FINGER RELEASE;  Surgeon: Jayla Penn MD;  Location:  MAIN OR;  Service: Orthopedics   • SHOULDER SURGERY Right      Family History   Problem Relation Age of Onset   • Alzheimer's disease Mother    • Atrial fibrillation Mother    • Dementia Mother    • Heart disease Mother         heart problem   • Seizures Mother    • Parkinsonism Father    • Arthritis Father    • Atrial fibrillation Father    • No Known Problems Daughter    • Cri-du-chat syndrome Daughter    • Colon cancer Maternal Grandmother [de-identified]   • Diabetes type II Maternal Grandmother    • No Known Problems Brother    • No Known Problems Son    • Substance Abuse Neg Hx         mother,father   • Mental illness Neg Hx         mother,father   • Colon polyps Neg Hx          Meds/Allergies     Current Outpatient Medications:   •  albuterol (2 5 mg/3 mL) 0 083 % nebulizer solution, Take 3 mL (2 5 mg total) by nebulization every 6 (six) hours as needed for wheezing or shortness of breath, Disp: 1080 mL, Rfl: 3  •  albuterol (PROVENTIL HFA,VENTOLIN HFA) 90 mcg/act inhaler, Inhale 2 puffs every 4 (four) hours as needed for wheezing, Disp: 8 5 g, Rfl: 5  •  atorvastatin (LIPITOR) 40 mg tablet, Take 1 tablet (40 mg total) by mouth daily, Disp: 90 tablet, Rfl: 3  •  Benralizumab 30 MG/ML SOAJ, Inject 1 mL under the skin every 28 days, Disp: 1 mL, Rfl: 11  •  Blood Glucose Monitoring Suppl ([employer] CONTOUR NEXT MONITOR) w/Device KIT, Use daily, Disp: , Rfl:   •  budesonide (PULMICORT) 0 5 mg/2 mL nebulizer solution, Take 2 mL (0 5 mg total) by nebulization 2 (two) times a day Rinse mouth after use , Disp: 60 mL, Rfl: 3  •  Contour Next Test test strip, USE TO TEST BLOOD SUGAR 3 TIMES A DAY, Disp: 100 strip, Rfl: 3  •  [employer] Lancets Thin 26G MISC, USE 3 (THREE) TIMES A DAY, Disp: 100 each, Rfl: 5  •  Eliquis 5 MG, TAKE 1 TABLET BY MOUTH TWICE A DAY, Disp: 60 tablet, Rfl: 5  •  escitalopram (LEXAPRO) 20 mg tablet, TAKE 1 TABLET BY MOUTH EVERY DAY, Disp: 90 tablet, Rfl: 0  •  fluticasone (FLONASE) 50 mcg/act nasal spray, 1 spray into each nostril 2 (two) times a day, Disp: 1 Bottle, Rfl: 3  •  glycopyrrolate-formoterol (BEVESPI AEROSPHERE) 9-4 8 MCG/ACT inhaler, Inhale 2 puffs 2 (two) times a day, Disp: 10 7 g, Rfl: 5  •  Insulin Glargine Solostar (Lantus SoloStar) 100 UNIT/ML SOPN, Inject 0 3 mL (30 Units total) under the skin daily at bedtime, Disp: 30 mL, Rfl: 1  •  insulin glulisine (Apidra SoloStar) 100 units/mL injection pen, 24 UNITS BREAKFAST 12 UNITS AT LUNCH AND DINNER, Disp: 15 mL, Rfl: 5  •  Insulin Pen Needle (BD Pen Needle Love 2nd Gen) 32G X 4 MM MISC, Use daily at bedtime Use 4 new needles daily   , Disp: 400 each, Rfl: 3  •  levalbuterol (XOPENEX) 1 25 mg/0 5 mL nebulizer solution, Take 0 5 mL (1 25 mg total) by nebulization 2 (two) times a day, Disp: 60 vial, Rfl: 0  •  loratadine (CLARITIN) 10 mg tablet, Take by mouth, Disp: , Rfl:   •  LORazepam (ATIVAN) 0 5 mg tablet, TAKE 2 TABLETS BY MOUTH AT BEDTIME AND 1 EVERY 6 HOURS AS NEEDED FOR ANXIETY, Disp: 90 tablet, Rfl: 0  •  metFORMIN (GLUCOPHAGE-XR) 500 mg 24 hr tablet, TAKE 2 TABLETS BY MOUTH TWICE A DAY WITH FOOD, Disp: 360 tablet, Rfl: 1  •  metoprolol succinate (TOPROL-XL) 100 mg 24 hr tablet, Take 1 tablet (100 mg total) by mouth daily, Disp: 90 tablet, Rfl: 3  •  montelukast (SINGULAIR) 10 mg tablet, TAKE 1 TABLET BY MOUTH DAILY AT BEDTIME, Disp: 90 tablet, Rfl: 3  •  naloxone (NARCAN) 4 mg/0 1 mL nasal spray, Administer 1 spray into a nostril   If breathing does not return to normal or if breathing difficulty resumes after 2-3 minutes, give another dose in the other nostril using a new spray , Disp: 1 each, Rfl: 1  •  omeprazole (PriLOSEC) 40 MG capsule, Take 1 capsule (40 mg total) by mouth daily, Disp: 90 capsule, Rfl: 3  •  potassium chloride (Klor-Con M20) 20 mEq tablet, Take 1 tablet (20 mEq total) by mouth 2 (two) times a day, Disp: 180 tablet, Rfl: 3  •  predniSONE 10 mg tablet, Take 4 tablets (40 mg total) by mouth daily for 3 "days, THEN 3 tablets (30 mg total) daily for 3 days, THEN 2 tablets (20 mg total) daily for 3 days, THEN 1 tablet (10 mg total) daily for 3 days  , Disp: 30 tablet, Rfl: 0  •  semaglutide, 0 25 or 0 5 mg/dose, (Ozempic, 0 25 or 0 5 MG/DOSE,) 2 mg/3 mL injection pen, Inject 0 75 mL (0 5 mg total) under the skin every 7 days, Disp: 3 mL, Rfl: 5  •  traZODone (DESYREL) 150 mg tablet, Take 1 tablet (150 mg total) by mouth daily at bedtime, Disp: 90 tablet, Rfl: 0  •  ascorbic acid (VITAMIN C) 1000 MG tablet, Take 1,000 mg by mouth daily (Patient not taking: Reported on 3/29/2023), Disp: , Rfl:   •  EPINEPHrine (EPIPEN) 0 3 mg/0 3 mL SOAJ, Inject 0 3 mL (0 3 mg total) into a muscle once for 1 dose, Disp: 0 6 mL, Rfl: 0  •  ferrous sulfate 220 (44 Fe) mg/5 mL solution, TAKE 5 ML (220 MG TOTAL) BY MOUTH 2 (TWO) TIMES A DAY BEFORE MEALS, Disp: 473 mL, Rfl: 2  •  torsemide (DEMADEX) 20 mg tablet, Take 3 tablets (60 mg total) by mouth 2 (two) times a day, Disp: 540 tablet, Rfl: 1  Allergies   Allergen Reactions   • Iron Dextran Swelling   • Januvia [Sitagliptin] Shortness Of Breath   • Jardiance [Empagliflozin] Shortness Of Breath   • Clonazepam Other (See Comments)     Unknown reaction   • Codeine Itching and Other (See Comments)     itch;mary kay morphine,takes ultram @home   • Adhesive [Medical Tape] Itching     itching   • Effexor [Venlafaxine] Itching   • Lactose - Food Allergy GI Intolerance   • Oxycodone-Acetaminophen Anxiety   • Oxycodone-Acetaminophen Itching and Rash     Other reaction(s): Other (See Comments)  (PERCOCET) MILD RASH, not allergic to Acetaminophen        Vitals: Blood pressure 112/64, pulse 68, temperature 97 6 °F (36 4 °C), temperature source Tympanic, resp  rate 15, height 5' 3\" (1 6 m), weight 129 kg (284 lb), SpO2 94 %, not currently breastfeeding  Body mass index is 50 31 kg/m²   Oxygen Therapy  SpO2: 94 %  Oxygen Therapy: Supplemental oxygen  O2 Delivery Method: Nasal cannula  O2 Flow Rate (L/min): 4 " "L/min      Physical Exam  General:  Awake alert and oriented x 3, conversant without conversational dyspnea, NAD, normal affect  HEENT:   Sclera noninjected, nonicteric OU, Nares patent,  no craniofacial abnormalities, Mucous membranes, moist, no oral lesions, normal dentition  NECK:  Trachea midline, no accessory muscle use, no stridor,  JVP not elevated  CARDIAC: Reg, single s1/S2, no m/r/g  PULM: CTA bilaterally no wheezing, rhonchi or rales, bilateral diminished equal air entry  ABD: Soft nontender, nondistended, no rebound, no rigidity, no guarding  EXT: No cyanosis, no clubbing, no edema, normal capillary refill  NEURO: no focal neurologic deficits, AAOx3, moving all extremities appropriately    Labs: I have personally reviewed pertinent lab results  , ABG: No results found for: \"BEART\", \"GRC4KJM\", \"J6TRNVNF\", \"RPP7WVP\", \"PHART\", \"PO2ART\", \"SOURCE\", BNP: No results found for: \"BNP\", CBC: No results found for: \"ADJUSTEDWBC\", \"HCT\", \"HGB\", \"MCH\", \"MCHC\", \"MCV\", \"MPV\", \"NRBC\", \"PLT\", \"RBC\", \"RDW\", \"WBC\", CMP: No results found for: \"ALKPHOS\", \"ALT\", \"ANIONGAP\", \"AST\", \"BILITOT\", \"BUN\", \"CALCIUM\", \"CL\", \"CO2\", \"CREATININE\", \"EGFR\", \"GLUCOSE\", \"K\", \"PROT\", \"SODIUM\", PT/INR: No results found for: \"INR\", \"PT\", Troponin: No results found for: \"TROPONINI\"  Lab Results   Component Value Date    HCT 33 7 (L) 06/06/2023    HGB 9 9 (L) 06/06/2023    MCV 68 (L) 06/06/2023     (H) 06/06/2023    WBC 20 95 (H) 06/06/2023     Lab Results   Component Value Date    BUN 22 06/06/2023    CALCIUM 8 2 (L) 06/06/2023    CL 93 (L) 06/06/2023    CO2 32 06/06/2023    CREATININE 0 72 06/06/2023    GLUCOSE 343 (H) 09/27/2022    K 3 6 06/06/2023     09/22/2017     No results found for: \"IGE\"  Lab Results   Component Value Date    ALKPHOS 112 (H) 06/06/2023    ALT 46 06/06/2023    AST 40 (H) 06/06/2023    BILITOT 0 4 09/22/2017         Imaging and other studies: I have personally reviewed pertinent reports      No orders to display " "  CT:  IMPRESSION:     Heterogeneous hepatic attenuation, may be secondary to steatosis  If there is concern for hepatitis, correlate with liver function and hepatitis testing      Mosaic attenuation throughout the lungs suggesting air trapping/small airway disease  This is similar to multiple prior examinations           Pulmonary function testing:   FEV1/FVC Ratio: 57 % (72% predicted)  Forced Vital Capacity:  1 08L    36 % predicted  FEV1: 0 62 L     26 % predicted  After administration of bronchodilator FEV1: 0 84 (+36%)     Lung volumes by body plethysmography: Total Lung Capacity 102 % predicted Residual volume 175 % predicted  RV/TLC ratio 171%     DLCO corrected for patients hemoglobin level: 54 %     6MWT:  Walked total of 2 mins; distance 109 6m             Marixa Kramer MD  [facility]       Portions of the record may have been created with voice recognition software  Occasional wrong word or \"sound a like\" substitutions may have occurred due to the inherent limitations of voice recognition software  Read the chart carefully and recognize, using context, where substitutions have occurred    "

## 2023-06-08 NOTE — PATIENT INSTRUCTIONS
BiPAP   AMBULATORY CARE:   Bilevel positive airway pressure (BiPAP)  is a treatment that uses mild air pressure to keep your airways open while you sleep  BiPAP is used to treat obstructive sleep apnea (WILMA) in people who cannot tolerate CPAP treatment  It is also used in people with obesity hypoventilation syndrome, central apnea, or restrictive or obstructive lung problems  Difference between BiPAP and CPAP:  The BiPAP machine delivers a higher amount of air pressure when you breathe in than when you breathe out  CPAP delivers a constant level of air pressure during treatment  In both, the mask connects to the machine with a hose  Air pressure is delivered to the mask through the hose  Benefits of BiPAP:   Improves quality of sleep     Relieves daytime sleepiness     Improves memory    Reduces the risk of heart disease    Improves your mood and quality of life    Make BiPAP easier to use: At first, try to use your BiPAP for a few hours every night  Then slowly increase the length of time you use your machine  It takes time to adjust to BiPAP treatment  You may need a mask that is a different size, shape, or material   Talk to your healthcare provider if your mask feels uncomfortable or irritates your skin  You may need to use a special moisturizer made for users  Use a saline nasal spray at bedtime to help relieve nasal irritation  A chin strap to help keep your mouth closed or a different type of mask can help dry mouth  Some machines come with a heated humidifier to help relieve these symptoms  Talk to your healthcare provider if you are having problems adjusting to the air pressure  He or she can tell you how to adjust the air pressure on your BiPAP  You may need to start at a lower pressure and slowly increase it over time      Call your healthcare provider for any of the following:   Continued sleepiness during the day, even after wearing your BiPAP device as directed    Continued problems caused by BiPAP that do not improve    Questions or concerns about your condition, care, or equipment  Follow up with your healthcare provider as directed:  Tell your healthcare provider if your mask no longer fits properly  Write down your questions so you remember to ask them during your visits  © Dawn[employer] 2022 Information is for End User's use only and may not be sold, redistributed or otherwise used for commercial purposes  The above information is an  only  It is not intended as medical advice for individual conditions or treatments  Talk to your doctor, nurse or pharmacist before following any medical regimen to see if it is safe and effective for you

## 2023-06-08 NOTE — ASSESSMENT & PLAN NOTE
Has been on APAP 12 to 20 cm H2O with excellent compliance she is very sleepy still with evidence of recurrent hypercapnic respiratory failure I would think she would benefit more from BiPAP with transcutaneous capnography monitoring to make sure and confirmed that her hypoventilation has been treated well

## 2023-06-12 ENCOUNTER — TELEPHONE (OUTPATIENT)
Dept: PULMONOLOGY | Facility: CLINIC | Age: 63
End: 2023-06-12

## 2023-06-12 NOTE — TELEPHONE ENCOUNTER
Sally Maria from [facility] has called in requesting clarification of the prescription for Benralizumab 30 MG/ML. She states the rx is usually written for injection every 8 weeks but was sent in for every 28 days. She would like to know if this is correct.  Please advise     [phone]  Fax # [phone]

## 2023-06-13 ENCOUNTER — PATIENT OUTREACH (OUTPATIENT)
Dept: FAMILY MEDICINE CLINIC | Facility: HOSPITAL | Age: 63
End: 2023-06-13

## 2023-06-13 NOTE — TELEPHONE ENCOUNTER
Called [phone] [facility] pharmacy and gave them the correct instructions for the [facility].

## 2023-06-13 NOTE — PROGRESS NOTES
Outpatient Care Management Note:    Care manager called Ivy  She was resting with her CPAP on  She states her breathing has been good  She denies any shortness of breath symptoms  She is using her oxygen at 4L  Ivy states that she is still on her steroid taper  Her blood sugars are still running high  This morning her sugar was 228 and after breakfast it was 381  She continues on her lantus 30 units at night and short acting insulin 24 units with breakfast and 12 units with lunch and dinner  CM voiced my concern about her continued elevated blood sugars  I instructed her to have her dexcom down loaded by endocrine, so they can evaluate sugars and adjust medications  Ivy states she did start the ozempic weekly  Ivy is not checking daily weights despite CM encouraging her to do it  CM reviewed that she needs to schedule with GI  Ivy states she has the contact information and will call  Ivy is scheduled with podiatry this week  CM reviewed that I think she is also due for her yearly eye exam      Sonalikale Magan states that she is planning on going out today to the food store and [facility]  She states that she is able to manage independently and is going by herself  Ivy has my contact information and will call with any questions

## 2023-06-25 DIAGNOSIS — K21.9 GASTROESOPHAGEAL REFLUX DISEASE WITHOUT ESOPHAGITIS: ICD-10-CM

## 2023-06-25 RX ORDER — OMEPRAZOLE 40 MG/1
40 CAPSULE, DELAYED RELEASE ORAL DAILY
Qty: 90 CAPSULE | Refills: 3 | Status: SHIPPED | OUTPATIENT
Start: 2023-06-25

## 2023-06-27 ENCOUNTER — PATIENT OUTREACH (OUTPATIENT)
Dept: FAMILY MEDICINE CLINIC | Facility: HOSPITAL | Age: 63
End: 2023-06-27

## 2023-06-27 NOTE — PROGRESS NOTES
Outpatient Care Management Note:    Care manager called Ivy  She states that her breathing is at her baseline  She is using her oxygen at 4L  She is scheduled to see cardiology tomorrow and is planning on attending  Ivy does not check daily weights despite CM encouraging her to do it  Ivy has not checked her blood sugar in about a week  She states that she has not received her dexcom supplies  She does have a glucometer  CM asked her why she is not using it, but she did not have an answer  She agreed to check her sugar this afternoon and will begin tracking her readings  She knows to call endocrine with any high or low readings  CM reinforced the importance of checking blood sugars when on insulin and reviewed risks of taking insulin without knowing what your sugar is at that time  Ivy agreed to call dexcom and follow up on her supplies  Cm again reviewed that she still needs to schedule with GI and eye doctor  She was to see podiatry last week but canceled the appt  CM asked Ivy if she feels she needs more help at home  She states that she is able to manage her ADLs independently  She is just slow  CM reviewed my concerns that she is not getting things done to be proactive in her care  Ivy has my contact information and will call with any questions

## 2023-06-28 DIAGNOSIS — E11.65 TYPE 2 DIABETES MELLITUS WITH HYPERGLYCEMIA, UNSPECIFIED WHETHER LONG TERM INSULIN USE (HCC): ICD-10-CM

## 2023-06-28 RX ORDER — PERPHENAZINE 16 MG/1
TABLET, FILM COATED ORAL
Qty: 100 STRIP | Refills: 3 | Status: SHIPPED | OUTPATIENT
Start: 2023-06-28

## 2023-06-30 DIAGNOSIS — Z79.899 ENCOUNTER FOR LONG-TERM (CURRENT) DRUG USE: ICD-10-CM

## 2023-06-30 RX ORDER — LORAZEPAM 0.5 MG/1
TABLET ORAL
Qty: 90 TABLET | Refills: 0 | Status: SHIPPED | OUTPATIENT
Start: 2023-06-30

## 2023-07-14 ENCOUNTER — TELEPHONE (OUTPATIENT)
Dept: PULMONOLOGY | Facility: CLINIC | Age: 63
End: 2023-07-14

## 2023-07-14 DIAGNOSIS — J44.1 COPD EXACERBATION (HCC): Primary | ICD-10-CM

## 2023-07-14 RX ORDER — AZITHROMYCIN 250 MG/1
TABLET, FILM COATED ORAL
Qty: 6 TABLET | Refills: 0 | Status: SHIPPED | OUTPATIENT
Start: 2023-07-14 | End: 2023-07-18

## 2023-07-14 RX ORDER — PREDNISONE 20 MG/1
40 TABLET ORAL DAILY
Qty: 10 TABLET | Refills: 0 | Status: SHIPPED | OUTPATIENT
Start: 2023-07-14 | End: 2023-07-19

## 2023-07-14 NOTE — TELEPHONE ENCOUNTER
Patient called in stating that she is having a hard time breathing I did offer her to come in and be seen. She said she is to weak. She wanted to see if a antibiotic  and prednisone can be called in.

## 2023-07-14 NOTE — TELEPHONE ENCOUNTER
Bindu Gaspar would like a return call regarding     What is the reason for the call/chief complaint? I called and spoke with Karan Burk, she reports for the past 2 weeks her breathing has been really bad. She thinks it is due to the heat, she wears 4L of oxygen at all times. I asked her to check her pulse ox but she says she doesn't know where her oximeter is. She denies wheezing or chest pain. She says she is coughing a little, no mucous. She reports she was last on prednisone and antibiotics when she was in the hospital in the beginning of June. What additional symptoms are present or absent? SOB, yes   Are they on O2? Yes, describe: 4L at all times  (She said she can't check her pulse ox)  chest pain/tightness, no  wheezing, no  Cough, yes  mucous production,no. fevers/chills, no  weight gain, no  Swelling no  Pain no,     When did they start/how long have they been going on? She says her breathing has been acting up for the past 2 weeks    Have you been exposed to anyone that is sick? No    Have you been tested for COVID recently? no    Check current pulmonary medications Using all nebs and inhalers as prescribed. Have they had any other treatment or testing for this problem elsewhere? no    Recent steroids? Yes, describe: prednisone beginning of June    Recent Antibiotics? Yes, describe: Janna Nj beginning of June     Last office visit? 6/8/23    Patient pharmacy?  [facility]

## 2023-07-14 NOTE — TELEPHONE ENCOUNTER
I sent prednisone and zpack to the pharmacy. If she worsens over the weekend she should go to the ER. She should make a follow up appointment given frequent exacerbations.

## 2023-07-18 ENCOUNTER — PATIENT OUTREACH (OUTPATIENT)
Dept: FAMILY MEDICINE CLINIC | Facility: HOSPITAL | Age: 63
End: 2023-07-18

## 2023-07-18 NOTE — PROGRESS NOTES
Outpatient Care Management Note:    Care manager called Ivy. She states that pulmonary called in prednisone and a zpack, because she was having increased shortness of breath. She feels that the medications are helping but she still is not at her baseline. We reviewed her nebulizer medications. Ivy is only using her levalbuterol and budesonide once a day. CM reinforced that they are ordered twice a day. She needs to take all medications as ordered if she wants her symptoms to improve. Ivy verbalized understanding. Ivy is using her oxygen at 4L. She has not been able to locate her pulse ox, but agreed to look for it. CM reinforced the importance of monitoring her oxygen levels. Ivy states that her blood sugars are elevated in the 400s due to the prednisone. I instructed her to call endocrine now to review. She agreed to this plan. Ivy did get her dexcom supplies. Ivy canceled her cardiology appt. She states she was feeling short of breath. CM reinforced that when she is not doing well, it is important to keep her appts, so she can be evaluated. She will call to reschedule. Ivy did see podiatry around 7/7. She still needs to schedule GI and eye exam.    Viktor Chua knows to call 911 for any increased shortness of breath or worsening breathing.

## 2023-07-22 ENCOUNTER — APPOINTMENT (EMERGENCY)
Dept: RADIOLOGY | Facility: HOSPITAL | Age: 63
DRG: 133 | End: 2023-07-22
Payer: COMMERCIAL

## 2023-07-22 ENCOUNTER — HOSPITAL ENCOUNTER (INPATIENT)
Facility: HOSPITAL | Age: 63
LOS: 4 days | Discharge: HOME | DRG: 133 | End: 2023-07-26
Attending: EMERGENCY MEDICINE | Admitting: HOSPITALIST
Payer: COMMERCIAL

## 2023-07-22 DIAGNOSIS — I50.9 CHF (CONGESTIVE HEART FAILURE) (HCC): ICD-10-CM

## 2023-07-22 DIAGNOSIS — J44.1 ACUTE EXACERBATION OF CHRONIC OBSTRUCTIVE PULMONARY DISEASE (COPD) (HCC): Primary | ICD-10-CM

## 2023-07-22 DIAGNOSIS — E11.22 TYPE 2 DIABETES MELLITUS WITH STAGE 2 CHRONIC KIDNEY DISEASE, WITHOUT LONG-TERM CURRENT USE OF INSULIN (HCC): ICD-10-CM

## 2023-07-22 DIAGNOSIS — E11.65 TYPE 2 DIABETES MELLITUS WITH HYPERGLYCEMIA, WITH LONG-TERM CURRENT USE OF INSULIN (HCC): ICD-10-CM

## 2023-07-22 DIAGNOSIS — G47.33 OBSTRUCTIVE SLEEP APNEA: ICD-10-CM

## 2023-07-22 DIAGNOSIS — Z79.4 TYPE 2 DIABETES MELLITUS WITH STAGE 2 CHRONIC KIDNEY DISEASE, WITH LONG-TERM CURRENT USE OF INSULIN (HCC): ICD-10-CM

## 2023-07-22 DIAGNOSIS — N18.2 TYPE 2 DIABETES MELLITUS WITH STAGE 2 CHRONIC KIDNEY DISEASE, WITHOUT LONG-TERM CURRENT USE OF INSULIN (HCC): ICD-10-CM

## 2023-07-22 DIAGNOSIS — N18.2 TYPE 2 DIABETES MELLITUS WITH STAGE 2 CHRONIC KIDNEY DISEASE, WITH LONG-TERM CURRENT USE OF INSULIN (HCC): ICD-10-CM

## 2023-07-22 DIAGNOSIS — E11.22 TYPE 2 DIABETES MELLITUS WITH STAGE 2 CHRONIC KIDNEY DISEASE, WITH LONG-TERM CURRENT USE OF INSULIN (HCC): ICD-10-CM

## 2023-07-22 DIAGNOSIS — Z79.4 TYPE 2 DIABETES MELLITUS WITH HYPERGLYCEMIA, WITH LONG-TERM CURRENT USE OF INSULIN (HCC): ICD-10-CM

## 2023-07-22 DIAGNOSIS — R09.02 HYPOXIA: ICD-10-CM

## 2023-07-22 PROBLEM — J96.21 ACUTE ON CHRONIC RESPIRATORY FAILURE WITH HYPOXIA (HCC): Status: ACTIVE | Noted: 2018-11-30

## 2023-07-22 LAB
ALBUMIN SERPL BCP-MCNC: 4.2 G/DL (ref 3.5–5)
ALP SERPL-CCNC: 148 U/L (ref 34–104)
ALT SERPL W P-5'-P-CCNC: 54 U/L (ref 7–52)
ANION GAP SERPL CALCULATED.3IONS-SCNC: 7 MMOL/L
AST SERPL W P-5'-P-CCNC: 21 U/L (ref 13–39)
ATRIAL RATE: 83 BPM
BASE EXCESS BLDA CALC-SCNC: 10 MMOL/L (ref -2–3)
BASOPHILS # BLD AUTO: 0.06 THOUSANDS/ÂΜL (ref 0–0.1)
BASOPHILS NFR BLD AUTO: 0 % (ref 0–1)
BILIRUB SERPL-MCNC: 0.48 MG/DL (ref 0.2–1)
BNP SERPL-MCNC: 112 PG/ML (ref 0–100)
BUN SERPL-MCNC: 10 MG/DL (ref 5–25)
CA-I BLD-SCNC: 1.05 MMOL/L (ref 1.12–1.32)
CALCIUM SERPL-MCNC: 8.3 MG/DL (ref 8.4–10.2)
CARDIAC TROPONIN I PNL SERPL HS: 6 NG/L
CHLORIDE SERPL-SCNC: 92 MMOL/L (ref 96–108)
CO2 SERPL-SCNC: 37 MMOL/L (ref 21–32)
CREAT SERPL-MCNC: 0.67 MG/DL (ref 0.6–1.3)
EOSINOPHIL # BLD AUTO: 0.28 THOUSAND/ÂΜL (ref 0–0.61)
EOSINOPHIL NFR BLD AUTO: 2 % (ref 0–6)
ERYTHROCYTE [DISTWIDTH] IN BLOOD BY AUTOMATED COUNT: 19.1 % (ref 11.6–15.1)
GFR SERPL CREATININE-BSD FRML MDRD: 93 ML/MIN/1.73SQ M
GLUCOSE SERPL-MCNC: 293 MG/DL (ref 65–140)
GLUCOSE SERPL-MCNC: 303 MG/DL (ref 65–140)
HCO3 BLDA-SCNC: 36.4 MMOL/L (ref 24–30)
HCT VFR BLD AUTO: 32.6 % (ref 34.8–46.1)
HCT VFR BLD CALC: 33 % (ref 34.8–46.1)
HGB BLD-MCNC: 9.3 G/DL (ref 11.5–15.4)
HGB BLDA-MCNC: 11.2 G/DL (ref 11.5–15.4)
IMM GRANULOCYTES # BLD AUTO: 0.08 THOUSAND/UL (ref 0–0.2)
IMM GRANULOCYTES NFR BLD AUTO: 1 % (ref 0–2)
LYMPHOCYTES # BLD AUTO: 1.81 THOUSANDS/ÂΜL (ref 0.6–4.47)
LYMPHOCYTES NFR BLD AUTO: 13 % (ref 14–44)
MAGNESIUM SERPL-MCNC: 2.1 MG/DL (ref 1.9–2.7)
MCH RBC QN AUTO: 19.5 PG (ref 26.8–34.3)
MCHC RBC AUTO-ENTMCNC: 28.5 G/DL (ref 31.4–37.4)
MCV RBC AUTO: 69 FL (ref 82–98)
MONOCYTES # BLD AUTO: 0.76 THOUSAND/ÂΜL (ref 0.17–1.22)
MONOCYTES NFR BLD AUTO: 5 % (ref 4–12)
NEUTROPHILS # BLD AUTO: 11.52 THOUSANDS/ÂΜL (ref 1.85–7.62)
NEUTS SEG NFR BLD AUTO: 79 % (ref 43–75)
NRBC BLD AUTO-RTO: 0 /100 WBCS
P AXIS: 90 DEGREES
PCO2 BLD: 38 MMOL/L (ref 21–32)
PCO2 BLD: 59.1 MM HG (ref 42–50)
PH BLD: 7.4 [PH] (ref 7.3–7.4)
PLATELET # BLD AUTO: 297 THOUSANDS/UL (ref 149–390)
PMV BLD AUTO: 10.9 FL (ref 8.9–12.7)
PO2 BLD: 29 MM HG (ref 35–45)
POTASSIUM BLD-SCNC: 3.6 MMOL/L (ref 3.5–5.3)
POTASSIUM SERPL-SCNC: 3.5 MMOL/L (ref 3.5–5.3)
PR INTERVAL: 174 MS
PROT SERPL-MCNC: 7 G/DL (ref 6.4–8.4)
QRS AXIS: 94 DEGREES
QRSD INTERVAL: 68 MS
QT INTERVAL: 376 MS
QTC INTERVAL: 441 MS
RBC # BLD AUTO: 4.76 MILLION/UL (ref 3.81–5.12)
SAO2 % BLD FROM PO2: 54 % (ref 60–85)
SODIUM BLD-SCNC: 135 MMOL/L (ref 136–145)
SODIUM SERPL-SCNC: 136 MMOL/L (ref 135–147)
SPECIMEN SOURCE: ABNORMAL
T WAVE AXIS: 85 DEGREES
VENTRICULAR RATE: 83 BPM
WBC # BLD AUTO: 14.51 THOUSAND/UL (ref 4.31–10.16)

## 2023-07-22 PROCEDURE — 82803 BLOOD GASES ANY COMBINATION: CPT

## 2023-07-22 PROCEDURE — 85025 COMPLETE CBC W/AUTO DIFF WBC: CPT

## 2023-07-22 PROCEDURE — 84484 ASSAY OF TROPONIN QUANT: CPT

## 2023-07-22 PROCEDURE — 36415 COLL VENOUS BLD VENIPUNCTURE: CPT

## 2023-07-22 PROCEDURE — 99285 EMERGENCY DEPT VISIT HI MDM: CPT

## 2023-07-22 PROCEDURE — 83880 ASSAY OF NATRIURETIC PEPTIDE: CPT

## 2023-07-22 PROCEDURE — 94644 CONT INHLJ TX 1ST HOUR: CPT

## 2023-07-22 PROCEDURE — 94760 N-INVAS EAR/PLS OXIMETRY 1: CPT

## 2023-07-22 PROCEDURE — 84132 ASSAY OF SERUM POTASSIUM: CPT

## 2023-07-22 PROCEDURE — 84295 ASSAY OF SERUM SODIUM: CPT

## 2023-07-22 PROCEDURE — 83735 ASSAY OF MAGNESIUM: CPT

## 2023-07-22 PROCEDURE — 82330 ASSAY OF CALCIUM: CPT

## 2023-07-22 PROCEDURE — 80053 COMPREHEN METABOLIC PANEL: CPT

## 2023-07-22 PROCEDURE — 71045 X-RAY EXAM CHEST 1 VIEW: CPT

## 2023-07-22 PROCEDURE — 85014 HEMATOCRIT: CPT

## 2023-07-22 PROCEDURE — 94002 VENT MGMT INPAT INIT DAY: CPT

## 2023-07-22 PROCEDURE — 93005 ELECTROCARDIOGRAM TRACING: CPT

## 2023-07-22 PROCEDURE — 82947 ASSAY GLUCOSE BLOOD QUANT: CPT

## 2023-07-22 RX ORDER — SODIUM CHLORIDE FOR INHALATION 0.9 %
3 VIAL, NEBULIZER (ML) INHALATION ONCE
Status: COMPLETED | OUTPATIENT
Start: 2023-07-22 | End: 2023-07-22

## 2023-07-22 RX ORDER — METHYLPREDNISOLONE SODIUM SUCCINATE 125 MG/2ML
125 INJECTION, POWDER, LYOPHILIZED, FOR SOLUTION INTRAMUSCULAR; INTRAVENOUS ONCE
Status: COMPLETED | OUTPATIENT
Start: 2023-07-22 | End: 2023-07-22

## 2023-07-22 RX ORDER — MAGNESIUM SULFATE HEPTAHYDRATE 40 MG/ML
2 INJECTION, SOLUTION INTRAVENOUS ONCE
Status: COMPLETED | OUTPATIENT
Start: 2023-07-22 | End: 2023-07-23

## 2023-07-22 RX ORDER — CEFTRIAXONE 1 G/50ML
1000 INJECTION, SOLUTION INTRAVENOUS ONCE
Status: DISCONTINUED | OUTPATIENT
Start: 2023-07-22 | End: 2023-07-22

## 2023-07-22 RX ORDER — FUROSEMIDE 10 MG/ML
40 INJECTION INTRAMUSCULAR; INTRAVENOUS ONCE
Status: COMPLETED | OUTPATIENT
Start: 2023-07-22 | End: 2023-07-22

## 2023-07-22 RX ADMIN — FUROSEMIDE 40 MG: 10 INJECTION, SOLUTION INTRAVENOUS at 23:55

## 2023-07-22 RX ADMIN — METHYLPREDNISOLONE SODIUM SUCCINATE 125 MG: 125 INJECTION, POWDER, FOR SOLUTION INTRAMUSCULAR; INTRAVENOUS at 23:55

## 2023-07-22 RX ADMIN — Medication 3 ML: at 21:29

## 2023-07-22 RX ADMIN — IPRATROPIUM BROMIDE 1 MG: 0.5 SOLUTION RESPIRATORY (INHALATION) at 21:29

## 2023-07-22 RX ADMIN — ALBUTEROL SULFATE 10 MG: 2.5 SOLUTION RESPIRATORY (INHALATION) at 21:29

## 2023-07-23 LAB
ANION GAP SERPL CALCULATED.3IONS-SCNC: 10 MMOL/L
ANION GAP SERPL CALCULATED.3IONS-SCNC: 12 MMOL/L
ANISOCYTOSIS BLD QL SMEAR: PRESENT
BASOPHILS # BLD AUTO: 0.04 THOUSANDS/ÂΜL (ref 0–0.1)
BASOPHILS NFR BLD AUTO: 0 % (ref 0–1)
BUN SERPL-MCNC: 12 MG/DL (ref 5–25)
BUN SERPL-MCNC: 17 MG/DL (ref 5–25)
CALCIUM SERPL-MCNC: 8.2 MG/DL (ref 8.4–10.2)
CALCIUM SERPL-MCNC: 8.2 MG/DL (ref 8.4–10.2)
CHLORIDE SERPL-SCNC: 89 MMOL/L (ref 96–108)
CHLORIDE SERPL-SCNC: 94 MMOL/L (ref 96–108)
CO2 SERPL-SCNC: 32 MMOL/L (ref 21–32)
CO2 SERPL-SCNC: 35 MMOL/L (ref 21–32)
CREAT SERPL-MCNC: 0.71 MG/DL (ref 0.6–1.3)
CREAT SERPL-MCNC: 0.87 MG/DL (ref 0.6–1.3)
EOSINOPHIL # BLD AUTO: 0.02 THOUSAND/ÂΜL (ref 0–0.61)
EOSINOPHIL NFR BLD AUTO: 0 % (ref 0–6)
ERYTHROCYTE [DISTWIDTH] IN BLOOD BY AUTOMATED COUNT: 18.9 % (ref 11.6–15.1)
FLUAV RNA RESP QL NAA+PROBE: NEGATIVE
FLUBV RNA RESP QL NAA+PROBE: NEGATIVE
GFR SERPL CREATININE-BSD FRML MDRD: 70 ML/MIN/1.73SQ M
GFR SERPL CREATININE-BSD FRML MDRD: 90 ML/MIN/1.73SQ M
GLUCOSE SERPL-MCNC: 300 MG/DL (ref 65–140)
GLUCOSE SERPL-MCNC: 366 MG/DL (ref 65–140)
GLUCOSE SERPL-MCNC: 378 MG/DL (ref 65–140)
GLUCOSE SERPL-MCNC: 410 MG/DL (ref 65–140)
GLUCOSE SERPL-MCNC: 437 MG/DL (ref 65–140)
GLUCOSE SERPL-MCNC: 468 MG/DL (ref 65–140)
GLUCOSE SERPL-MCNC: 507 MG/DL (ref 65–140)
GLUCOSE SERPL-MCNC: >500 MG/DL (ref 65–140)
GLUCOSE SERPL-MCNC: >500 MG/DL (ref 65–140)
HCT VFR BLD AUTO: 33.2 % (ref 34.8–46.1)
HGB BLD-MCNC: 9.2 G/DL (ref 11.5–15.4)
HYPERCHROMIA BLD QL SMEAR: PRESENT
IMM GRANULOCYTES # BLD AUTO: 0.14 THOUSAND/UL (ref 0–0.2)
IMM GRANULOCYTES NFR BLD AUTO: 1 % (ref 0–2)
LYMPHOCYTES # BLD AUTO: 0.84 THOUSANDS/ÂΜL (ref 0.6–4.47)
LYMPHOCYTES NFR BLD AUTO: 4 % (ref 14–44)
MCH RBC QN AUTO: 18.9 PG (ref 26.8–34.3)
MCHC RBC AUTO-ENTMCNC: 27.7 G/DL (ref 31.4–37.4)
MCV RBC AUTO: 68 FL (ref 82–98)
MONOCYTES # BLD AUTO: 0.08 THOUSAND/ÂΜL (ref 0.17–1.22)
MONOCYTES NFR BLD AUTO: 0 % (ref 4–12)
NEUTROPHILS # BLD AUTO: 18.28 THOUSANDS/ÂΜL (ref 1.85–7.62)
NEUTS SEG NFR BLD AUTO: 95 % (ref 43–75)
NRBC BLD AUTO-RTO: 0 /100 WBCS
PLATELET # BLD AUTO: 308 THOUSANDS/UL (ref 149–390)
PLATELET BLD QL SMEAR: ADEQUATE
PMV BLD AUTO: 10.9 FL (ref 8.9–12.7)
POLYCHROMASIA BLD QL SMEAR: PRESENT
POTASSIUM SERPL-SCNC: 3.7 MMOL/L (ref 3.5–5.3)
POTASSIUM SERPL-SCNC: 3.9 MMOL/L (ref 3.5–5.3)
PROCALCITONIN SERPL-MCNC: 0.06 NG/ML
RBC # BLD AUTO: 4.86 MILLION/UL (ref 3.81–5.12)
RSV RNA RESP QL NAA+PROBE: NEGATIVE
SARS-COV-2 RNA RESP QL NAA+PROBE: NEGATIVE
SODIUM SERPL-SCNC: 133 MMOL/L (ref 135–147)
SODIUM SERPL-SCNC: 139 MMOL/L (ref 135–147)
WBC # BLD AUTO: 19.4 THOUSAND/UL (ref 4.31–10.16)

## 2023-07-23 PROCEDURE — 84145 PROCALCITONIN (PCT): CPT | Performed by: INTERNAL MEDICINE

## 2023-07-23 PROCEDURE — 82948 REAGENT STRIP/BLOOD GLUCOSE: CPT

## 2023-07-23 PROCEDURE — 94003 VENT MGMT INPAT SUBQ DAY: CPT

## 2023-07-23 PROCEDURE — 85025 COMPLETE CBC W/AUTO DIFF WBC: CPT | Performed by: INTERNAL MEDICINE

## 2023-07-23 PROCEDURE — 36415 COLL VENOUS BLD VENIPUNCTURE: CPT | Performed by: INTERNAL MEDICINE

## 2023-07-23 PROCEDURE — 80048 BASIC METABOLIC PNL TOTAL CA: CPT | Performed by: PHYSICIAN ASSISTANT

## 2023-07-23 PROCEDURE — 80048 BASIC METABOLIC PNL TOTAL CA: CPT | Performed by: INTERNAL MEDICINE

## 2023-07-23 PROCEDURE — 0241U HB NFCT DS VIR RESP RNA 4 TRGT: CPT | Performed by: INTERNAL MEDICINE

## 2023-07-23 PROCEDURE — 94760 N-INVAS EAR/PLS OXIMETRY 1: CPT

## 2023-07-23 PROCEDURE — 94640 AIRWAY INHALATION TREATMENT: CPT

## 2023-07-23 PROCEDURE — 99245 OFF/OP CONSLTJ NEW/EST HI 55: CPT | Performed by: INTERNAL MEDICINE

## 2023-07-23 PROCEDURE — 94660 CPAP INITIATION&MGMT: CPT

## 2023-07-23 PROCEDURE — 99223 1ST HOSP IP/OBS HIGH 75: CPT | Performed by: HOSPITALIST

## 2023-07-23 RX ORDER — POTASSIUM CHLORIDE 20 MEQ/1
20 TABLET, EXTENDED RELEASE ORAL 2 TIMES DAILY
Status: DISCONTINUED | OUTPATIENT
Start: 2023-07-23 | End: 2023-07-26

## 2023-07-23 RX ORDER — BUDESONIDE 0.5 MG/2ML
0.5 INHALANT ORAL 2 TIMES DAILY
Status: DISCONTINUED | OUTPATIENT
Start: 2023-07-23 | End: 2023-07-24

## 2023-07-23 RX ORDER — SODIUM CHLORIDE FOR INHALATION 0.9 %
3 VIAL, NEBULIZER (ML) INHALATION
Status: DISCONTINUED | OUTPATIENT
Start: 2023-07-23 | End: 2023-07-23

## 2023-07-23 RX ORDER — METOPROLOL SUCCINATE 50 MG/1
100 TABLET, EXTENDED RELEASE ORAL DAILY
Status: DISCONTINUED | OUTPATIENT
Start: 2023-07-23 | End: 2023-07-26 | Stop reason: HOSPADM

## 2023-07-23 RX ORDER — FORMOTEROL FUMARATE 20 UG/2ML
20 SOLUTION RESPIRATORY (INHALATION)
Status: DISCONTINUED | OUTPATIENT
Start: 2023-07-23 | End: 2023-07-26 | Stop reason: HOSPADM

## 2023-07-23 RX ORDER — LEVALBUTEROL INHALATION SOLUTION 1.25 MG/3ML
1.25 SOLUTION RESPIRATORY (INHALATION)
Status: DISCONTINUED | OUTPATIENT
Start: 2023-07-23 | End: 2023-07-26 | Stop reason: HOSPADM

## 2023-07-23 RX ORDER — ESCITALOPRAM OXALATE 20 MG/1
20 TABLET ORAL DAILY
Status: DISCONTINUED | OUTPATIENT
Start: 2023-07-23 | End: 2023-07-26 | Stop reason: HOSPADM

## 2023-07-23 RX ORDER — ALBUTEROL SULFATE 90 UG/1
2 AEROSOL, METERED RESPIRATORY (INHALATION) EVERY 4 HOURS PRN
Status: DISCONTINUED | OUTPATIENT
Start: 2023-07-23 | End: 2023-07-26 | Stop reason: HOSPADM

## 2023-07-23 RX ORDER — ATORVASTATIN CALCIUM 40 MG/1
40 TABLET, FILM COATED ORAL DAILY
Status: DISCONTINUED | OUTPATIENT
Start: 2023-07-23 | End: 2023-07-26 | Stop reason: HOSPADM

## 2023-07-23 RX ORDER — INSULIN LISPRO 100 [IU]/ML
1-6 INJECTION, SOLUTION INTRAVENOUS; SUBCUTANEOUS
Status: DISCONTINUED | OUTPATIENT
Start: 2023-07-23 | End: 2023-07-23

## 2023-07-23 RX ORDER — PANTOPRAZOLE SODIUM 40 MG/1
40 TABLET, DELAYED RELEASE ORAL
Status: DISCONTINUED | OUTPATIENT
Start: 2023-07-23 | End: 2023-07-26 | Stop reason: HOSPADM

## 2023-07-23 RX ORDER — LORATADINE 10 MG/1
10 TABLET ORAL DAILY
Status: DISCONTINUED | OUTPATIENT
Start: 2023-07-23 | End: 2023-07-26 | Stop reason: HOSPADM

## 2023-07-23 RX ORDER — INSULIN LISPRO 100 [IU]/ML
5-25 INJECTION, SOLUTION INTRAVENOUS; SUBCUTANEOUS
Status: DISCONTINUED | OUTPATIENT
Start: 2023-07-23 | End: 2023-07-24

## 2023-07-23 RX ORDER — ACETAMINOPHEN 325 MG/1
650 TABLET ORAL EVERY 6 HOURS PRN
Status: DISCONTINUED | OUTPATIENT
Start: 2023-07-23 | End: 2023-07-26 | Stop reason: HOSPADM

## 2023-07-23 RX ORDER — MONTELUKAST SODIUM 10 MG/1
10 TABLET ORAL
Status: DISCONTINUED | OUTPATIENT
Start: 2023-07-23 | End: 2023-07-26 | Stop reason: HOSPADM

## 2023-07-23 RX ORDER — INSULIN LISPRO 100 [IU]/ML
24 INJECTION, SOLUTION INTRAVENOUS; SUBCUTANEOUS
Status: DISCONTINUED | OUTPATIENT
Start: 2023-07-23 | End: 2023-07-24

## 2023-07-23 RX ORDER — METFORMIN HYDROCHLORIDE 500 MG/1
1000 TABLET, EXTENDED RELEASE ORAL 2 TIMES DAILY WITH MEALS
Status: DISCONTINUED | OUTPATIENT
Start: 2023-07-23 | End: 2023-07-23

## 2023-07-23 RX ORDER — INSULIN LISPRO 100 [IU]/ML
4-20 INJECTION, SOLUTION INTRAVENOUS; SUBCUTANEOUS
Status: DISCONTINUED | OUTPATIENT
Start: 2023-07-23 | End: 2023-07-24

## 2023-07-23 RX ORDER — FLUTICASONE PROPIONATE 50 MCG
1 SPRAY, SUSPENSION (ML) NASAL 2 TIMES DAILY
Status: DISCONTINUED | OUTPATIENT
Start: 2023-07-23 | End: 2023-07-26 | Stop reason: HOSPADM

## 2023-07-23 RX ORDER — INSULIN LISPRO 100 [IU]/ML
12 INJECTION, SOLUTION INTRAVENOUS; SUBCUTANEOUS 2 TIMES DAILY WITH MEALS
Status: DISCONTINUED | OUTPATIENT
Start: 2023-07-23 | End: 2023-07-23

## 2023-07-23 RX ORDER — FUROSEMIDE 10 MG/ML
60 INJECTION INTRAMUSCULAR; INTRAVENOUS ONCE
Status: COMPLETED | OUTPATIENT
Start: 2023-07-23 | End: 2023-07-23

## 2023-07-23 RX ORDER — INSULIN GLARGINE 100 [IU]/ML
33 INJECTION, SOLUTION SUBCUTANEOUS
Status: DISCONTINUED | OUTPATIENT
Start: 2023-07-23 | End: 2023-07-24

## 2023-07-23 RX ORDER — TORSEMIDE 20 MG/1
60 TABLET ORAL 2 TIMES DAILY
Status: DISCONTINUED | OUTPATIENT
Start: 2023-07-23 | End: 2023-07-26 | Stop reason: HOSPADM

## 2023-07-23 RX ORDER — INSULIN LISPRO 100 [IU]/ML
15 INJECTION, SOLUTION INTRAVENOUS; SUBCUTANEOUS ONCE
Status: COMPLETED | OUTPATIENT
Start: 2023-07-23 | End: 2023-07-23

## 2023-07-23 RX ORDER — TRAZODONE HYDROCHLORIDE 150 MG/1
150 TABLET ORAL
Status: DISCONTINUED | OUTPATIENT
Start: 2023-07-23 | End: 2023-07-26 | Stop reason: HOSPADM

## 2023-07-23 RX ORDER — INSULIN LISPRO 100 [IU]/ML
15 INJECTION, SOLUTION INTRAVENOUS; SUBCUTANEOUS 2 TIMES DAILY WITH MEALS
Status: DISCONTINUED | OUTPATIENT
Start: 2023-07-23 | End: 2023-07-24

## 2023-07-23 RX ORDER — METHYLPREDNISOLONE SODIUM SUCCINATE 40 MG/ML
40 INJECTION, POWDER, LYOPHILIZED, FOR SOLUTION INTRAMUSCULAR; INTRAVENOUS EVERY 8 HOURS
Status: DISCONTINUED | OUTPATIENT
Start: 2023-07-23 | End: 2023-07-24

## 2023-07-23 RX ORDER — INSULIN GLARGINE 100 [IU]/ML
30 INJECTION, SOLUTION SUBCUTANEOUS
Status: DISCONTINUED | OUTPATIENT
Start: 2023-07-23 | End: 2023-07-23

## 2023-07-23 RX ADMIN — TRAZODONE HYDROCHLORIDE 150 MG: 150 TABLET ORAL at 21:31

## 2023-07-23 RX ADMIN — TORSEMIDE 60 MG: 20 TABLET ORAL at 17:47

## 2023-07-23 RX ADMIN — METHYLPREDNISOLONE SODIUM SUCCINATE 40 MG: 40 INJECTION, POWDER, FOR SOLUTION INTRAMUSCULAR; INTRAVENOUS at 09:10

## 2023-07-23 RX ADMIN — IPRATROPIUM BROMIDE 0.5 MG: 0.5 SOLUTION RESPIRATORY (INHALATION) at 14:35

## 2023-07-23 RX ADMIN — APIXABAN 5 MG: 5 TABLET, FILM COATED ORAL at 09:08

## 2023-07-23 RX ADMIN — BUDESONIDE 0.5 MG: 0.5 INHALANT RESPIRATORY (INHALATION) at 19:13

## 2023-07-23 RX ADMIN — INSULIN LISPRO 24 UNITS: 100 INJECTION, SOLUTION INTRAVENOUS; SUBCUTANEOUS at 09:04

## 2023-07-23 RX ADMIN — INSULIN LISPRO 6 UNITS: 100 INJECTION, SOLUTION INTRAVENOUS; SUBCUTANEOUS at 01:38

## 2023-07-23 RX ADMIN — INSULIN LISPRO 20 UNITS: 100 INJECTION, SOLUTION INTRAVENOUS; SUBCUTANEOUS at 18:22

## 2023-07-23 RX ADMIN — ESCITALOPRAM OXALATE 20 MG: 20 TABLET ORAL at 09:07

## 2023-07-23 RX ADMIN — INSULIN LISPRO 15 UNITS: 100 INJECTION, SOLUTION INTRAVENOUS; SUBCUTANEOUS at 19:46

## 2023-07-23 RX ADMIN — AZITHROMYCIN MONOHYDRATE 500 MG: 500 INJECTION, POWDER, LYOPHILIZED, FOR SOLUTION INTRAVENOUS at 01:21

## 2023-07-23 RX ADMIN — POTASSIUM CHLORIDE 20 MEQ: 1500 TABLET, EXTENDED RELEASE ORAL at 09:06

## 2023-07-23 RX ADMIN — INSULIN LISPRO 25 UNITS: 100 INJECTION, SOLUTION INTRAVENOUS; SUBCUTANEOUS at 21:31

## 2023-07-23 RX ADMIN — LEVALBUTEROL HYDROCHLORIDE 1.25 MG: 1.25 SOLUTION RESPIRATORY (INHALATION) at 19:31

## 2023-07-23 RX ADMIN — POTASSIUM CHLORIDE 20 MEQ: 1500 TABLET, EXTENDED RELEASE ORAL at 17:47

## 2023-07-23 RX ADMIN — MONTELUKAST 10 MG: 10 TABLET, FILM COATED ORAL at 21:31

## 2023-07-23 RX ADMIN — MONTELUKAST 10 MG: 10 TABLET, FILM COATED ORAL at 01:27

## 2023-07-23 RX ADMIN — FUROSEMIDE 60 MG: 10 INJECTION, SOLUTION INTRAMUSCULAR; INTRAVENOUS at 15:07

## 2023-07-23 RX ADMIN — TORSEMIDE 60 MG: 20 TABLET ORAL at 09:08

## 2023-07-23 RX ADMIN — INSULIN LISPRO 6 UNITS: 100 INJECTION, SOLUTION INTRAVENOUS; SUBCUTANEOUS at 12:37

## 2023-07-23 RX ADMIN — TRAZODONE HYDROCHLORIDE 150 MG: 150 TABLET ORAL at 01:27

## 2023-07-23 RX ADMIN — PANTOPRAZOLE SODIUM 40 MG: 40 TABLET, DELAYED RELEASE ORAL at 05:30

## 2023-07-23 RX ADMIN — FLUTICASONE PROPIONATE 1 SPRAY: 50 SPRAY, METERED NASAL at 11:16

## 2023-07-23 RX ADMIN — INSULIN LISPRO 15 UNITS: 100 INJECTION, SOLUTION INTRAVENOUS; SUBCUTANEOUS at 18:23

## 2023-07-23 RX ADMIN — FORMOTEROL FUMARATE DIHYDRATE 20 MCG: 20 SOLUTION RESPIRATORY (INHALATION) at 07:54

## 2023-07-23 RX ADMIN — MAGNESIUM SULFATE HEPTAHYDRATE 2 G: 2 INJECTION, SOLUTION INTRAVENOUS at 00:01

## 2023-07-23 RX ADMIN — INSULIN GLARGINE 33 UNITS: 100 INJECTION, SOLUTION SUBCUTANEOUS at 21:30

## 2023-07-23 RX ADMIN — METHYLPREDNISOLONE SODIUM SUCCINATE 40 MG: 40 INJECTION, POWDER, FOR SOLUTION INTRAMUSCULAR; INTRAVENOUS at 21:31

## 2023-07-23 RX ADMIN — IPRATROPIUM BROMIDE 0.5 MG: 0.5 SOLUTION RESPIRATORY (INHALATION) at 07:58

## 2023-07-23 RX ADMIN — APIXABAN 5 MG: 5 TABLET, FILM COATED ORAL at 17:47

## 2023-07-23 RX ADMIN — INSULIN LISPRO 6 UNITS: 100 INJECTION, SOLUTION INTRAVENOUS; SUBCUTANEOUS at 09:05

## 2023-07-23 RX ADMIN — INSULIN LISPRO 12 UNITS: 100 INJECTION, SOLUTION INTRAVENOUS; SUBCUTANEOUS at 12:38

## 2023-07-23 RX ADMIN — ATORVASTATIN CALCIUM 40 MG: 40 TABLET, FILM COATED ORAL at 09:07

## 2023-07-23 RX ADMIN — FORMOTEROL FUMARATE DIHYDRATE 20 MCG: 20 SOLUTION RESPIRATORY (INHALATION) at 19:49

## 2023-07-23 RX ADMIN — METHYLPREDNISOLONE SODIUM SUCCINATE 40 MG: 40 INJECTION, POWDER, FOR SOLUTION INTRAMUSCULAR; INTRAVENOUS at 14:36

## 2023-07-23 RX ADMIN — LORATADINE 10 MG: 10 TABLET ORAL at 09:09

## 2023-07-23 RX ADMIN — LEVALBUTEROL HYDROCHLORIDE 1.25 MG: 1.25 SOLUTION RESPIRATORY (INHALATION) at 07:54

## 2023-07-23 RX ADMIN — INSULIN GLARGINE 30 UNITS: 100 INJECTION, SOLUTION SUBCUTANEOUS at 01:23

## 2023-07-23 RX ADMIN — IPRATROPIUM BROMIDE 0.5 MG: 0.5 SOLUTION RESPIRATORY (INHALATION) at 19:31

## 2023-07-23 RX ADMIN — METOPROLOL SUCCINATE 100 MG: 50 TABLET, EXTENDED RELEASE ORAL at 09:09

## 2023-07-23 RX ADMIN — LEVALBUTEROL HYDROCHLORIDE 1.25 MG: 1.25 SOLUTION RESPIRATORY (INHALATION) at 14:35

## 2023-07-23 RX ADMIN — FLUTICASONE PROPIONATE 1 SPRAY: 50 SPRAY, METERED NASAL at 21:30

## 2023-07-23 NOTE — CONSULTS
Consult Note - Pulmonary   Mattie Kiser 57 y.o. female MRN: [id number]  Unit/Bed#: ED 04 Encounter: [id number]      Reason for consultation: Asthma/COPD exacerbation    Requesting provider: ROSALIO Miller    Assessment & Recommendations:   1. Acute/chronic hypoxic and chronic hypercarbic respiratory failure  · Titrate O2 to keep SpO2 >88%, IS, OOB-chair  · Will give trial of BiPAP while admitted - 16/8cmH2O, may consider BiPAP approval prior to discharge, is to get BiPAP titration as outpatient    2. Asthma/COPD overlap with suspected fixed obstructive asthma with acute exacerbation  · Continue solumedrol 40mg q8hrs for now  · Continue xopenex/atrovent TID and pulmicort/perforomist BID  · Monitor off abx  · Needs to return to [facility] therapy  · Would ensure appropriate nebulizer regimen at discharge    3. Chronic diastolic CHF with mild exacerbation  · Suspect some degree of volume overload with LE edema and increased abdominal girth  · Continue torsemide now but will also give single dose lasix 60mg IV x 1 now and monitor response    4. DM2 with steroid induced hyperglycemia - per primary service    5. WILMA possible [facility] - trial of BiPAP tonight      History of Present Illness   HPI:  Urszula Moon is a 57 y.o. female who has Asthma/COPD overlap syndrome (on Fasenra/Bevespi/Pulmicort nebs) with chronic hypoxic/hypercarbic respiratory failure (4L/min NC). She follows with Dr. Aundra Klinefelter as an outpatient. She also has afib on eliquis, chronic diastolic CHF, DM2, and WILMA on AutoCPAP. She reports never returning to baseline from prior admission in June. She noted over the past 2 weeks increased NP cough, dyspnea and wheeze. Also endorsed malaise and significant dyspnea with minimal ambulation. She was given Zpack and prednisone last week with only transient improvement. Was using CPAP during day for respiratory support. Reported intermittently using nebulizer (albuterol) but less frequently than normal due to fatigue.  She denied fevers or chills, no sick contacts, no changes to home environment. She has not been on Fasenra for many months due to prescription running out - was renewed at last visit but patient has not yet obtained. She also reports increasing abdominal girth and some increased LE edema. She reports some improvements since admission but not near baseline. Review of systems:  12 point review of systems was completed and was otherwise negative except as listed in HPI. Historical Information   Past Medical History:   Diagnosis Date   • Acute blood loss anemia 2021   • Acute diverticulitis 2021   • Alcohol abuse 2020   • Anemia    • Atrial fibrillation [facility])    • Cervical radiculopathy    • CHF (congestive heart failure) (HCC)    • Chronic low back pain    • Chronic obstructive asthma ([address]) 2018   • Cigarette nicotine dependence without complication     Quit 12/10/2019.     • Community acquired pneumonia 2020   • Depression with anxiety 3/9/2014   • Diabetes mellitus with hyperglycemia ([address])    • Diverticulitis 2021   • Elevated liver enzymes    • GERD (gastroesophageal reflux disease)    • Hypersomnolence 10/28/2020   • Hypokalemia 2018   • Paresthesia of upper extremity    • Plantar fasciitis    • Restless legs syndrome 2014   • Sexual dysfunction    • Sleep apnea, obstructive    • Tenosynovitis of finger    • Tinea corporis    • Tobacco use 2018    Currently smoking 3-4 cigarettes daily   • Trigger middle finger of left hand 2017   • Trigger ring finger of left hand 2017   • Weakness of upper extremity      Past Surgical History:   Procedure Laterality Date   • ABDOMINAL SURGERY     • CARPAL TUNNEL RELEASE Bilateral    •  SECTION     • CHOLECYSTECTOMY      laparoscopic   • ESOPHAGOGASTRODUODENOSCOPY N/A 12/3/2018    Procedure: ESOPHAGOGASTRODUODENOSCOPY (EGD) AND COLONOSCOPY;  Surgeon: Danis Kinney MD;  Location:  MAIN OR; Service: Gastroenterology   • GASTRIC BYPASS      for morbid obesity with gilda en y   • HERNIA REPAIR     • HYSTERECTOMY     • HI EXCISION GANGLION WRIST DORSAL/VOLAR PRIMARY Left 12/14/2017    Procedure: LONG FINGER GANGLION CYST EXCISION;  Surgeon: James García MD;  Location: [facility];  Service: Orthopedics   • HI TENDON SHEATH INCISION Left 12/14/2017    Procedure: Vishal Ortizverly, RING, TRIGGER FINGER RELEASE;  Surgeon: James García MD;  Location: [facility];  Service: Orthopedics   • SHOULDER SURGERY Right      Family History   Problem Relation Age of Onset   • Alzheimer's disease Mother    • Atrial fibrillation Mother    • Dementia Mother    • Heart disease Mother         heart problem   • Seizures Mother    • Parkinsonism Father    • Arthritis Father    • Atrial fibrillation Father    • No Known Problems Daughter    • Cri-du-chat syndrome Daughter    • Colon cancer Maternal Grandmother 80   • Diabetes type II Maternal Grandmother    • No Known Problems Brother    • No Known Problems Son    • Substance Abuse Neg Hx         mother,father   • Mental illness Neg Hx         mother,father   • Colon polyps Neg Hx        Occupational History: previous [employer] work    Social History: former smoker quit 2019 but reported 1 pack per week on average, denied any exotic animal exposures    Meds/Allergies   Current Facility-Administered Medications   Medication Dose Route Frequency   • acetaminophen (TYLENOL) tablet 650 mg  650 mg Oral Q6H PRN   • albuterol (PROVENTIL HFA,VENTOLIN HFA) inhaler 2 puff  2 puff Inhalation Q4H PRN   • apixaban (ELIQUIS) tablet 5 mg  5 mg Oral BID   • atorvastatin (LIPITOR) tablet 40 mg  40 mg Oral Daily   • budesonide (PULMICORT) inhalation solution 0.5 mg  0.5 mg Nebulization BID   • escitalopram (LEXAPRO) tablet 20 mg  20 mg Oral Daily   • fluticasone (FLONASE) 50 mcg/act nasal spray 1 spray  1 spray Nasal BID   • formoterol (PERFOROMIST) nebulizer solution 20 mcg  20 mcg Nebulization Q12H   • insulin glargine (LANTUS) subcutaneous injection 30 Units 0.3 mL  30 Units Subcutaneous HS   • insulin lispro (HumaLOG) 100 units/mL subcutaneous injection 1-6 Units  1-6 Units Subcutaneous TID AC   • insulin lispro (HumaLOG) 100 units/mL subcutaneous injection 1-6 Units  1-6 Units Subcutaneous HS   • insulin lispro (HumaLOG) 100 units/mL subcutaneous injection 12 Units  12 Units Subcutaneous BID With Meals   • insulin lispro (HumaLOG) 100 units/mL subcutaneous injection 24 Units  24 Units Subcutaneous Daily With Breakfast   • ipratropium (ATROVENT) 0.02 % inhalation solution 0.5 mg  0.5 mg Nebulization TID   • levalbuterol (XOPENEX) inhalation solution 1.25 mg  1.25 mg Nebulization TID   • loratadine (CLARITIN) tablet 10 mg  10 mg Oral Daily   • methylPREDNISolone sodium succinate (Solu-MEDROL) injection 40 mg  40 mg Intravenous Q8H   • metoprolol succinate (TOPROL-XL) 24 hr tablet 100 mg  100 mg Oral Daily   • montelukast (SINGULAIR) tablet 10 mg  10 mg Oral HS   • pantoprazole (PROTONIX) EC tablet 40 mg  40 mg Oral Early Morning   • potassium chloride (K-DUR,KLOR-CON) CR tablet 20 mEq  20 mEq Oral BID   • torsemide (DEMADEX) tablet 60 mg  60 mg Oral BID   • traZODone (DESYREL) tablet 150 mg  150 mg Oral HS     (Not in a hospital admission)    Allergies   Allergen Reactions   • Iron Dextran Swelling   • Januvia [Sitagliptin] Shortness Of Breath   • Jardiance [Empagliflozin] Shortness Of Breath   • Clonazepam Other (See Comments)     Unknown reaction   • Codeine Itching and Other (See Comments)     itch;mary kay morphine,takes ultram @home   • Adhesive [Medical Tape] Itching     itching   • Effexor [Venlafaxine] Itching   • Lactose - Food Allergy GI Intolerance   • Oxycodone-Acetaminophen Anxiety   • Oxycodone-Acetaminophen Itching and Rash     Other reaction(s):  Other (See Comments)  (PERCOCET) MILD RASH, not allergic to Acetaminophen        Vitals: Blood pressure 129/58, pulse 96, temperature 98.5 °F (36.9 °C), temperature source Oral, resp. rate 22, height 5' 3" (1.6 m), weight 129 kg (284 lb), SpO2 95 %, not currently breastfeeding. , 4LNC, Body mass index is 50.31 kg/m². Intake/Output Summary (Last 24 hours) at 7/23/2023 1232  Last data filed at 7/23/2023 1229  Gross per 24 hour   Intake 300 ml   Output 1900 ml   Net -1600 ml       Physical Exam  General: Obese older woman, in chair, eating lunch awake alert and oriented x 3, conversant but mild intermittent conversational dyspnea, NAD, normal affect  HEENT:  PERRL, Sclera noninjected, nonicteric OU, Nares patent, no nasal flaring, no nasal drainage, Mucous membranes moist, no oral lesions  NECK: Trachea midline, no accessory muscle use, no stridor, no cervical or supraclavicular adenopathy, JVP not elevated  CARDIAC: Reg, single s1/S2, no m/r/g  PULM: diminished BS diffusely, prolonged expiratory phase, no wheeze or rales appreciated  CHEST: No gross deformities, equal chest expansion on inspiration bilaterally  ABD: obese, normoactive bowel sounds, soft nontender, mildly distended, no rebound, no rigidity, no guarding  EXT: No cyanosis, no clubbing, 1+ pretibial edema, normal capillary refill  SKIN:  No rashes, no lesions  NEURO: no focal neurologic deficits, AAOx3, moving all extremities appropriately    Labs: I have personally reviewed pertinent lab results.   Laboratory and Diagnostics  Results from last 7 days   Lab Units 07/23/23 0532 07/22/23 2223 07/22/23  2108   WBC Thousand/uL 19.40* 14.51*  --    HEMOGLOBIN g/dL 9.2* 9.3*  --    I STAT HEMOGLOBIN g/dl  --   --  11.2*   HEMATOCRIT % 33.2* 32.6*  --    HEMATOCRIT, ISTAT %  --   --  33*   PLATELETS Thousands/uL 308 297  --    NEUTROS PCT % 95* 79*  --    MONOS PCT % 0* 5  --    EOS PCT % 0 2  --      Results from last 7 days   Lab Units 07/23/23  0532 07/22/23 2223 07/22/23  2108   SODIUM mmol/L 139 136  --    POTASSIUM mmol/L 3.9 3.5  --    CHLORIDE mmol/L 94* 92*  --    CO2 mmol/L 35* 37*  --    CO2, I-STAT mmol/L  --   --  38*   ANION GAP mmol/L 10 7  --    BUN mg/dL 12 10  --    CREATININE mg/dL 0.71 0.67  --    CALCIUM mg/dL 8.2* 8.3*  --    GLUCOSE RANDOM mg/dL 300* 293*  --    ALT U/L  --  54*  --    AST U/L  --  21  --    ALK PHOS U/L  --  148*  --    ALBUMIN g/dL  --  4.2  --    TOTAL BILIRUBIN mg/dL  --  0.48  --      Results from last 7 days   Lab Units 23  2223   MAGNESIUM mg/dL 2.1                                   Results from last 7 days   Lab Units 23  0532   PROCALCITONIN ng/ml 0.06       MICRO  FLU/RSV/COVID neg    Imaging and other studies: I have personally reviewed pertinent reports. and I have personally reviewed pertinent films in PACS  CXR 2023 - under penetrated film with suggestion of vascular congestion and hilar fullness, no dense consolidation or PTX    Pulmonary function testin/10/2022 - Ratio 57%, FVC 36%, FEV1 26%, post BD inc FEV1 36%, %, %, DLCO 54% - severe obstructive airflow defect with reduced VC, significant BD response, normal TLC with inc RV c/w air trapping, moderately reduced diffusion    6MWT 2022 - stopped after 4 min, total walk distance 110 meters and required 4L/min NC for SpO2 90%    EKG, Pathology, and Other Studies: I have personally reviewed pertinent reports. TTE 10/2022 - EF 60%, grade II diastolic dysfunction, normal RV size/function, RVSP 48mmHG    Code Status: Level 1 - Full Code      Andres Oropeza DO, [facility]

## 2023-07-23 NOTE — ASSESSMENT & PLAN NOTE
Wt Readings from Last 3 Encounters:   07/22/23 129 kg (284 lb)   06/08/23 129 kg (284 lb)   06/06/23 129 kg (284 lb 9.6 oz)     · Does not appear fluid overloaded on exam. No lung crackles noted. · BNP slightly elevated a 112  · Received IV Lasix 40 mg in the ER.   Hold further IV doses at this time  · Continue home torsemide  · Monitor I/O and daily weights

## 2023-07-23 NOTE — ASSESSMENT & PLAN NOTE
· Chronically on 4 L nasal cannula  · Initially required BiPAP in the ER.   Able to be weaned down to 6 L nasal cannula  · COPD exacerbation being treated with IV steroids and nebulizer treatments  · Respiratory protocol  · Wean oxygen to baseline when able

## 2023-07-23 NOTE — H&P
[facility]  H&P  Name: Dana Kiser 57 y.o. female I MRN: [id number]  Unit/Bed#: ED 04 I Date of Admission: 7/22/2023   Date of Service: 7/23/2023 I Hospital Day: 1      Assessment/Plan   * Asthma with COPD with exacerbation [facility])  Assessment & Plan  · Presents from home due to worsening SOB, initially placed on BiPAP. Able to be weaned down to 6 L nasal cannula after breathing treatment, steroids and IV Lasix. · Chronically on 4 L nasal cannula  · Recent admission from 6/2 through 6/6 with COPD exacerbation. DC on prednisone taper. Patient reports compliance and had felt better up until about 2 weeks ago. · Called pulmonology on 7/14 due to SOB. Started on Z-Carlos and prednisone taper  · Finished both of these. Reports improvement initially but sob worsened after completion of steroids  · Home regimen: (Noncompliant with all nebulizers)  · Bevespi twice daily  · Budesonide twice daily  · Benralizumab monthly injection  · Montelukast  · Xopenex twice daily  · In the ER received IV Solu-Medrol, IV azithromycin and breathing treatments  · Plan:  · Respiratory protocol  · IV Solu-Medrol 40 mg every 8 hours  · Xopenex/Atrovent 3 times daily  · Pulmicort twice daily  · Performist twice daily  · Hold further doses of IV azithromycin. Patient recently on antibiotic. Afebrile. Does not appear bacterial at this time  · Consult pulmonology    Acute on chronic respiratory failure with hypoxia ([address])  Assessment & Plan  · Chronically on 4 L nasal cannula  · Initially required BiPAP in the ER.   Able to be weaned down to 6 L nasal cannula  · COPD exacerbation being treated with IV steroids and nebulizer treatments  · Respiratory protocol  · Wean oxygen to baseline when able    Chronic diastolic congestive heart failure [facility])  Assessment & Plan  Wt Readings from Last 3 Encounters:   07/22/23 129 kg (284 lb)   06/08/23 129 kg (284 lb)   06/06/23 129 kg (284 lb 9.6 oz)     · Does not appear fluid overloaded on exam. No lung crackles noted. · BNP slightly elevated a 112  · Received IV Lasix 40 mg in the ER. Hold further IV doses at this time  · Continue home torsemide  · Monitor I/O and daily weights      Class 3 severe obesity in adult [facility])  Assessment & Plan  · Body mass index is 50.31 kg/m². · Encourage lifestyle changes   · Consult nutrition     Paroxysmal atrial fibrillation (HCC)  Assessment & Plan  · Home regimen: Eliquis 5 mg twice daily, metoprolol succinate 100 mg daily    Type 2 diabetes mellitus with stage 2 chronic kidney disease, with long-term current use of insulin ([facility])  Assessment & Plan  Lab Results   Component Value Date    HGBA1C 9.9 (H) 05/11/2023       No results for input(s): "POCGLU" in the last 72 hours. Blood Sugar Average: Last 72 hrs:  · Home regimen: Glargine 30 units daily at bedtime, glulisine 24 units with breakfast, 12 units with lunch and dinner  · SSI    Obstructive sleep apnea  Assessment & Plan  · CPAP with AutoPap 12-20 at bedtime       VTE Pharmacologic Prophylaxis: VTE Score: 5 High Risk (Score >/= 5) - Pharmacological DVT Prophylaxis Ordered: apixaban (Eliquis). Sequential Compression Devices Ordered. Code Status: Level 1 - Full Code   Discussion with family: Patient declined call to . Anticipated Length of Stay: Patient will be admitted on an inpatient basis with an anticipated length of stay of greater than 2 midnights secondary to COPD exacerbation. Total Time Spent on Date of Encounter in care of patient: 65 minutes This time was spent on one or more of the following: performing physical exam; counseling and coordination of care; obtaining or reviewing history; documenting in the medical record; reviewing/ordering tests, medications or procedures; communicating with other healthcare professionals and discussing with patient's family/caregivers.     Chief Complaint: Shortness of breath     History of Present Illness:  Risa Abrams is a 57 y.o. female with a PMH of COPD, asthma, chronic hypoxia on 4 L, CHF, obesity, WILMA on CPAP, PAF, DM2 who presents from home due to worsening shortness of breath that has been ongoing for about 2 weeks. Called her pulmonologist on 7/14. Prescription written for Z-matty and prednisone taper. Patient reports compliance and had improvement in symptoms but after steroid was complete symptoms returned. Does not have a pulse ox at home. Compliant with home inhalers and oral medications. Not compliant with nebulizer treatments for about 2 weeks due to feeling too tired and weak. In the ER placed on BIPAP initially. Able to be weaned down to 6 L NC after breathing treatments and lasix. Denies fevers, chills, congestion or cough. Review of Systems:  Review of Systems   Constitutional: Negative for fatigue and fever. HENT: Negative for sore throat. Respiratory: Positive for shortness of breath. Negative for cough and chest tightness. Cardiovascular: Negative for chest pain. Gastrointestinal: Negative for abdominal distention, abdominal pain, diarrhea, nausea and vomiting. Genitourinary: Negative for difficulty urinating. Musculoskeletal: Negative for arthralgias. Neurological: Positive for weakness. Negative for headaches. Psychiatric/Behavioral: Negative for agitation and behavioral problems. All other systems reviewed and are negative. Past Medical and Surgical History:   Past Medical History:   Diagnosis Date   • Acute blood loss anemia 6/1/2021   • Acute diverticulitis 5/2/2021   • Alcohol abuse 5/21/2020   • Anemia    • Atrial fibrillation [facility])    • Cervical radiculopathy    • CHF (congestive heart failure) (HCC)    • Chronic low back pain    • Chronic obstructive asthma ([address] [facility]) 2/20/2018   • Cigarette nicotine dependence without complication 46/60/8375    Quit 12/10/2019.     • Community acquired pneumonia 5/21/2020   • Depression with anxiety 3/9/2014   • Diabetes mellitus with hyperglycemia ([address])    • Diverticulitis 2021   • Elevated liver enzymes    • GERD (gastroesophageal reflux disease)    • Hypersomnolence 10/28/2020   • Hypokalemia 2018   • Paresthesia of upper extremity    • Plantar fasciitis    • Restless legs syndrome 2014   • Sexual dysfunction    • Sleep apnea, obstructive    • Tenosynovitis of finger    • Tinea corporis    • Tobacco use 2018    Currently smoking 3-4 cigarettes daily   • Trigger middle finger of left hand 2017   • Trigger ring finger of left hand 2017   • Weakness of upper extremity        Past Surgical History:   Procedure Laterality Date   • ABDOMINAL SURGERY     • CARPAL TUNNEL RELEASE Bilateral    •  SECTION     • CHOLECYSTECTOMY      laparoscopic   • ESOPHAGOGASTRODUODENOSCOPY N/A 12/3/2018    Procedure: ESOPHAGOGASTRODUODENOSCOPY (EGD) AND COLONOSCOPY;  Surgeon: Danis Kinney MD;  Location: [facility];  Service: Gastroenterology   • GASTRIC BYPASS      for morbid obesity with gilda en y   • HERNIA REPAIR     • HYSTERECTOMY     • CT EXCISION GANGLION WRIST DORSAL/VOLAR PRIMARY Left 2017    Procedure: LONG FINGER GANGLION CYST EXCISION;  Surgeon: Zara Astudillo MD;  Location: [facility];  Service: Orthopedics   • CT TENDON SHEATH INCISION Left 2017    Procedure: Maris Romberg, RING, TRIGGER FINGER RELEASE;  Surgeon: Zara Astudillo MD;  Location: [facility];  Service: Orthopedics   • SHOULDER SURGERY Right        Meds/Allergies:  Prior to Admission medications    Medication Sig Start Date End Date Taking?  Authorizing Provider   albuterol (2.5 mg/3 mL) 0.083 % nebulizer solution Take 3 mL (2.5 mg total) by nebulization every 6 (six) hours as needed for wheezing or shortness of breath 23   Anton Mcneil MD   albuterol (PROVENTIL HFA,VENTOLIN HFA) 90 mcg/act inhaler Inhale 2 puffs every 4 (four) hours as needed for wheezing 23   Anton Mcneil MD   ascorbic acid (VITAMIN C) 1000 MG tablet Take 1,000 mg by mouth daily  Patient not taking: Reported on 3/29/2023    Historical Provider, MD   atorvastatin (LIPITOR) 40 mg tablet Take 1 tablet (40 mg total) by mouth daily 3/29/23   Ellyn Wooten MD   Benralizumab 30 MG/ML SOAJ Inject 1 mL under the skin every 28 days 6/8/23   Anushka Hammonds MD   Blood Glucose Monitoring Suppl (NABILA CONTOUR NEXT MONITOR) w/Device KIT Use daily 7/14/17   Historical Provider, MD   budesonide (PULMICORT) 0.5 mg/2 mL nebulizer solution Take 2 mL (0.5 mg total) by nebulization 2 (two) times a day Rinse mouth after use. 6/8/23   Anushka Hammonds MD   Contour Next Test test strip USE TO TEST BLOOD SUGAR 3 TIMES A DAY 6/28/23   Laith Olivares MD   [employer] Lancets Thin 26G MISC USE 3 (THREE) TIMES A DAY 5/4/22   Laith Olivares MD   Eliquis 5 MG TAKE 1 TABLET BY MOUTH TWICE A DAY 12/12/22   Laith Olivares MD   EPINEPHrine (EPIPEN) 0.3 mg/0.3 mL SOAJ Inject 0.3 mL (0.3 mg total) into a muscle once for 1 dose 12/21/22 3/29/23  Suyapa Arredondo PA-C   escitalopram (LEXAPRO) 20 mg tablet TAKE 1 TABLET BY MOUTH EVERY DAY 6/8/23   Laith Olivares MD   ferrous sulfate 220 (44 Fe) mg/5 mL solution TAKE 5 ML (220 MG TOTAL) BY MOUTH 2 (TWO) TIMES A DAY BEFORE MEALS 2/1/22 6/3/23  Abby Aguirre MD   fluticasone [facility]) 50 mcg/act nasal spray 1 spray into each nostril 2 (two) times a day 9/23/20   Anushka Hammonds MD   glycopyrrolate-formoterol (BEVESPI AEROSPHERE) 9-4.8 MCG/ACT inhaler Inhale 2 puffs 2 (two) times a day 6/8/23   Anushka Hammonds MD   Insulin Glargine Solostar (Lantus SoloStar) 100 UNIT/ML SOPN Inject 0.3 mL (30 Units total) under the skin daily at bedtime 3/1/23   Jacey Moscoso PA-C   insulin glulisine (Apidra SoloStar) 100 units/mL injection pen 24 UNITS BREAKFAST 12 UNITS AT LUNCH AND DINNER 4/12/23   Jacey Moscoso PA-C   Insulin Pen Needle (BD Pen Needle Love 2nd Gen) 32G X 4 MM MISC Use daily at bedtime Use 4 new needles daily .  2/3/22   Petrona Gregorio Graff MD   levalbuterol (XOPENEX) 1.25 mg/0.5 mL nebulizer solution Take 0.5 mL (1.25 mg total) by nebulization 2 (two) times a day 11/16/20   Patric Charles DO   loratadine (CLARITIN) 10 mg tablet Take by mouth    Historical MD Yen   LORazepam (ATIVAN) 0.5 mg tablet TAKE 2 TABLETS BY MOUTH AT BEDTIME AND 1 EVERY 6 HOURS AS NEEDED FOR ANXIETY 6/30/23   Laith Olivares MD   metFORMIN (GLUCOPHAGE-XR) 500 mg 24 hr tablet TAKE 2 TABLETS BY MOUTH TWICE A DAY WITH FOOD 3/2/23   Laith Olivares MD   metoprolol succinate (TOPROL-XL) 100 mg 24 hr tablet Take 1 tablet (100 mg total) by mouth daily 3/29/23   Carlos Byrne MD   montelukast (SINGULAIR) 10 mg tablet TAKE 1 TABLET BY MOUTH DAILY AT BEDTIME 9/16/22   Laith Olivares MD   naloxone (NARCAN) 4 mg/0.1 mL nasal spray Administer 1 spray into a nostril. If breathing does not return to normal or if breathing difficulty resumes after 2-3 minutes, give another dose in the other nostril using a new spray. 7/29/20   Laith Olivares MD   omeprazole (PriLOSEC) 40 MG capsule TAKE 1 CAPSULE (40 MG TOTAL) BY MOUTH DAILY. 6/25/23   Laith Olivares MD   potassium chloride (Klor-Con M20) 20 mEq tablet Take 1 tablet (20 mEq total) by mouth 2 (two) times a day 3/29/23   Carlos Byrne MD   semaglutide, 0.25 or 0.5 mg/dose, (Ozempic, 0.25 or 0.5 MG/DOSE,) 2 mg/3 mL injection pen Inject 0.75 mL (0.5 mg total) under the skin every 7 days 5/17/23   Marisa Borges PA-C   torsemide [facility]) 20 mg tablet Take 3 tablets (60 mg total) by mouth 2 (two) times a day 2/27/23 6/3/23  Sebastien Keller MD   traZODone (DESYREL) 150 mg tablet Take 1 tablet (150 mg total) by mouth daily at bedtime 5/31/23   Sebastien Keller MD     I have reviewed home medications with patient personally. Allergies:    Allergies   Allergen Reactions   • Iron Dextran Swelling   • Januvia [Sitagliptin] Shortness Of Breath   • Jardiance [Empagliflozin] Shortness Of Breath   • Clonazepam Other (See Comments)     Unknown reaction   • Codeine Itching and Other (See Comments)     itch;mary kay morphine,takes ultram @home   • Adhesive [Medical Tape] Itching     itching   • Effexor [Venlafaxine] Itching   • Lactose - Food Allergy GI Intolerance   • Oxycodone-Acetaminophen Anxiety   • Oxycodone-Acetaminophen Itching and Rash     Other reaction(s):  Other (See Comments)  (PERCOCET) MILD RASH, not allergic to Acetaminophen        Social History:  Marital Status: Significant Other   Occupation: unknown  Patient Pre-hospital Living Situation: Home  Patient Pre-hospital Level of Mobility: walks  Patient Pre-hospital Diet Restrictions: diabetic/chf  Substance Use History:   Social History     Substance and Sexual Activity   Alcohol Use Not Currently   • Alcohol/week: 20.0 standard drinks of alcohol   • Types: 20 Cans of beer per week    Comment: about 6 coors light every night, stopped in 2019     Social History     Tobacco Use   Smoking Status Former   • Packs/day: 0.25   • Years: 29.00   • Total pack years: 7.25   • Types: Cigarettes   • Start date: 200   • Quit date: 12/10/2019   • Years since quitting: 3.6   Smokeless Tobacco Never     Social History     Substance and Sexual Activity   Drug Use No       Family History:  Family History   Problem Relation Age of Onset   • Alzheimer's disease Mother    • Atrial fibrillation Mother    • Dementia Mother    • Heart disease Mother         heart problem   • Seizures Mother    • Parkinsonism Father    • Arthritis Father    • Atrial fibrillation Father    • No Known Problems Daughter    • Cri-du-chat syndrome Daughter    • Colon cancer Maternal Grandmother 80   • Diabetes type II Maternal Grandmother    • No Known Problems Brother    • No Known Problems Son    • Substance Abuse Neg Hx         mother,father   • Mental illness Neg Hx         mother,father   • Colon polyps Neg Hx        Physical Exam:     Vitals:   Blood Pressure: 142/61 (07/22/23 2040)  Pulse: 81 (07/22/23 2040)  Temperature: 98.5 °F (36.9 °C) (07/22/23 2040)  Temp Source: Oral (07/22/23 2040)  Respirations: 20 (07/22/23 2040)  Height: 5' 3" (160 cm) (07/22/23 2040)  Weight - Scale: 129 kg (284 lb) (07/22/23 2040)  SpO2: 98 % (07/22/23 2158)    Physical Exam  Vitals and nursing note reviewed. Constitutional:       Appearance: Normal appearance. She is morbidly obese. HENT:      Head: Normocephalic. Eyes:      Extraocular Movements: Extraocular movements intact. Pupils: Pupils are equal, round, and reactive to light. Cardiovascular:      Rate and Rhythm: Normal rate and regular rhythm. Heart sounds: No murmur heard. Pulmonary:      Effort: Pulmonary effort is normal. No respiratory distress. Breath sounds: No wheezing. Comments: Decreased breath sounds  Abdominal:      General: Bowel sounds are normal. There is no distension. Tenderness: There is no abdominal tenderness. There is no guarding. Musculoskeletal:         General: Normal range of motion. Cervical back: Normal range of motion. Right lower leg: No edema. Left lower leg: No edema. Skin:     General: Skin is warm. Neurological:      General: No focal deficit present. Mental Status: She is alert and oriented to person, place, and time. Psychiatric:         Mood and Affect: Mood normal.         Behavior: Behavior normal.         Thought Content:  Thought content normal.          Additional Data:     Lab Results:  Results from last 7 days   Lab Units 07/22/23  2223   WBC Thousand/uL 14.51*   HEMOGLOBIN g/dL 9.3*   HEMATOCRIT % 32.6*   PLATELETS Thousands/uL 297   NEUTROS PCT % 79*   LYMPHS PCT % 13*   MONOS PCT % 5   EOS PCT % 2     Results from last 7 days   Lab Units 07/22/23  2223   SODIUM mmol/L 136   POTASSIUM mmol/L 3.5   CHLORIDE mmol/L 92*   CO2 mmol/L 37*   BUN mg/dL 10   CREATININE mg/dL 0.67   ANION GAP mmol/L 7   CALCIUM mg/dL 8.3*   ALBUMIN g/dL 4.2   TOTAL BILIRUBIN mg/dL 0.48 ALK PHOS U/L 148*   ALT U/L 54*   AST U/L 21   GLUCOSE RANDOM mg/dL 293*                       Lines/Drains:  Invasive Devices     Peripheral Intravenous Line  Duration           Peripheral IV 07/22/23 Right Antecubital <1 day                    Imaging: Reviewed radiology reports from this admission including: chest xray  XR chest 1 view portable   ED Interpretation by Pola Corcoran PA-C (07/22 0946)   ED Interpretation: Cardiomegaly, mild pulmonary venous congestion. EKG and Other Studies Reviewed on Admission:   · EKG: NSR. HR 83.    ** Please Note: This note has been constructed using a voice recognition system.  **

## 2023-07-23 NOTE — ASSESSMENT & PLAN NOTE
Lab Results   Component Value Date    HGBA1C 9.9 (H) 05/11/2023       No results for input(s): "POCGLU" in the last 72 hours.     Blood Sugar Average: Last 72 hrs:  · Home regimen: Glargine 30 units daily at bedtime, glulisine 24 units with breakfast, 12 units with lunch and dinner  · SSI

## 2023-07-23 NOTE — ED PROVIDER NOTES
History  Chief Complaint   Patient presents with   • Shortness of Breath     Pt states that she has been feeling sob for 2 weeks. Pt states that she has been getting worse everyday. Pt states that today she couldn't even get up and move around. Pt is on 4L NC all the time      Patient is a 49-year-old female with medical history significant for DM, A-fib, CHF, COPD on 4 L/min continuously, GERD who presents to the ED for evaluation of worsening SOB. Patient reports approximately 2 weeks of worsening shortness of breath, she states she does currently follow with pulm was placed on a 5-day course of azithromycin and steroids. She states that the steroids did initially help but shortly after finishing the steroid she states she began worsening. Patient states she typically is SOB on exertion room, states lately she has been experiencing SOB even at rest.  Her friend is present on initial evaluation states she has been able to came complete sentences without taking breaths in between words. Patient does report increased fatigue, denies any recent fevers, headaches, vision changes, chest pain, abdominal pain, NVD, dysuria. Prior to Admission Medications   Prescriptions Last Dose Informant Patient Reported? Taking?    Benralizumab 30 MG/ML SOAJ   No No   Sig: Inject 1 mL under the skin every 28 days   Blood Glucose Monitoring Suppl (buuteeq CONTOUR NEXT MONITOR) w/Device KIT  Self Yes No   Sig: Use daily   [facility]  Self No No   Sig: USE 3 (THREE) TIMES A DAY   Contour Next Test test strip   No No   Sig: USE TO TEST BLOOD SUGAR 3 TIMES A DAY   EPINEPHrine (EPIPEN) 0.3 mg/0.3 mL SOAJ  Self No No   Sig: Inject 0.3 mL (0.3 mg total) into a muscle once for 1 dose   Eliquis 5 MG  Self No No   Sig: TAKE 1 TABLET BY MOUTH TWICE A DAY   Insulin Glargine Solostar (Lantus SoloStar) 100 UNIT/ML SOPN  Self No No   Sig: Inject 0.3 mL (30 Units total) under the skin daily at bedtime   Insulin Pen Needle (BD Pen Needle Love 2nd Gen) 32G X 4 MM MISC  Self No No   Sig: Use daily at bedtime Use 4 new needles daily .    LORazepam (ATIVAN) 0.5 mg tablet   No No   Sig: TAKE 2 TABLETS BY MOUTH AT BEDTIME AND 1 EVERY 6 HOURS AS NEEDED FOR ANXIETY   albuterol (2.5 mg/3 mL) 0.083 % nebulizer solution   No No   Sig: Take 3 mL (2.5 mg total) by nebulization every 6 (six) hours as needed for wheezing or shortness of breath   albuterol (PROVENTIL HFA,VENTOLIN HFA) 90 mcg/act inhaler   No No   Sig: Inhale 2 puffs every 4 (four) hours as needed for wheezing   ascorbic acid (VITAMIN C) 1000 MG tablet  Self Yes No   Sig: Take 1,000 mg by mouth daily   Patient not taking: Reported on 3/29/2023   atorvastatin (LIPITOR) 40 mg tablet  Self No No   Sig: Take 1 tablet (40 mg total) by mouth daily   budesonide (PULMICORT) 0.5 mg/2 mL nebulizer solution   No No   Sig: Take 2 mL (0.5 mg total) by nebulization 2 (two) times a day Rinse mouth after use.   escitalopram (LEXAPRO) 20 mg tablet  Self No No   Sig: TAKE 1 TABLET BY MOUTH EVERY DAY   ferrous sulfate 220 (44 Fe) mg/5 mL solution  Self No No   Sig: TAKE 5 ML (220 MG TOTAL) BY MOUTH 2 (TWO) TIMES A DAY BEFORE MEALS   fluticasone (FLONASE) 50 mcg/act nasal spray  Self No No   Si spray into each nostril 2 (two) times a day   glycopyrrolate-formoterol (BEVESPI AEROSPHERE) 9-4.8 MCG/ACT inhaler   No No   Sig: Inhale 2 puffs 2 (two) times a day   insulin glulisine (Apidra SoloStar) 100 units/mL injection pen  Self No No   Si UNITS BREAKFAST 12 UNITS AT LUNCH AND DINNER   levalbuterol (XOPENEX) 1.25 mg/0.5 mL nebulizer solution  Self No No   Sig: Take 0.5 mL (1.25 mg total) by nebulization 2 (two) times a day   loratadine (CLARITIN) 10 mg tablet  Self Yes No   Sig: Take by mouth   metFORMIN (GLUCOPHAGE-XR) 500 mg 24 hr tablet  Self No No   Sig: TAKE 2 TABLETS BY MOUTH TWICE A DAY WITH FOOD   metoprolol succinate (TOPROL-XL) 100 mg 24 hr tablet  Self No No   Sig: Take 1 tablet (100 mg total) by mouth daily   montelukast (SINGULAIR) 10 mg tablet  Self No No   Sig: TAKE 1 TABLET BY MOUTH DAILY AT BEDTIME   naloxone (NARCAN) 4 mg/0.1 mL nasal spray  Self No No   Sig: Administer 1 spray into a nostril. If breathing does not return to normal or if breathing difficulty resumes after 2-3 minutes, give another dose in the other nostril using a new spray. omeprazole (PriLOSEC) 40 MG capsule   No No   Sig: TAKE 1 CAPSULE (40 MG TOTAL) BY MOUTH DAILY. potassium chloride (Klor-Con M20) 20 mEq tablet  Self No No   Sig: Take 1 tablet (20 mEq total) by mouth 2 (two) times a day   semaglutide, 0.25 or 0.5 mg/dose, (Ozempic, 0.25 or 0.5 MG/DOSE,) 2 mg/3 mL injection pen  Self No No   Sig: Inject 0.75 mL (0.5 mg total) under the skin every 7 days   torsemide (DEMADEX) 20 mg tablet  Self No No   Sig: Take 3 tablets (60 mg total) by mouth 2 (two) times a day   traZODone (DESYREL) 150 mg tablet  Self No No   Sig: Take 1 tablet (150 mg total) by mouth daily at bedtime      Facility-Administered Medications: None       Past Medical History:   Diagnosis Date   • Acute blood loss anemia 6/1/2021   • Acute diverticulitis 5/2/2021   • Alcohol abuse 5/21/2020   • Anemia    • Atrial fibrillation ([address])    • Cervical radiculopathy    • CHF (congestive heart failure) ([address])    • Chronic low back pain    • Chronic obstructive asthma ([address]) 2/20/2018   • Cigarette nicotine dependence without complication 83/05/5347    Quit 12/10/2019.     • Community acquired pneumonia 5/21/2020   • Depression with anxiety 3/9/2014   • Diabetes mellitus with hyperglycemia ([address])    • Diverticulitis 6/6/2021   • Elevated liver enzymes    • GERD (gastroesophageal reflux disease)    • Hypersomnolence 10/28/2020   • Hypokalemia 12/4/2018   • Paresthesia of upper extremity    • Plantar fasciitis    • Restless legs syndrome 4/8/2014   • Sexual dysfunction    • Sleep apnea, obstructive    • Tenosynovitis of finger    • Tinea corporis    • Tobacco use 2018    Currently smoking 3-4 cigarettes daily   • Trigger middle finger of left hand 2017   • Trigger ring finger of left hand 2017   • Weakness of upper extremity        Past Surgical History:   Procedure Laterality Date   • ABDOMINAL SURGERY     • CARPAL TUNNEL RELEASE Bilateral    •  SECTION     • CHOLECYSTECTOMY      laparoscopic   • ESOPHAGOGASTRODUODENOSCOPY N/A 12/3/2018    Procedure: ESOPHAGOGASTRODUODENOSCOPY (EGD) AND COLONOSCOPY;  Surgeon: Charo Osman MD;  Location: [facility];  Service: Gastroenterology   • GASTRIC BYPASS      for morbid obesity with gilda en y   • HERNIA REPAIR     • HYSTERECTOMY     • NY EXCISION GANGLION WRIST DORSAL/VOLAR PRIMARY Left 2017    Procedure: LONG FINGER GANGLION CYST EXCISION;  Surgeon: Radha Fregoso MD;  Location: [facility];  Service: Orthopedics   • NY TENDON SHEATH INCISION Left 2017    Procedure: Shital Daugherty, RING, TRIGGER FINGER RELEASE;  Surgeon: Radha Fregoso MD;  Location: [facility];  Service: Orthopedics   • SHOULDER SURGERY Right        Family History   Problem Relation Age of Onset   • Alzheimer's disease Mother    • Atrial fibrillation Mother    • Dementia Mother    • Heart disease Mother         heart problem   • Seizures Mother    • Parkinsonism Father    • Arthritis Father    • Atrial fibrillation Father    • No Known Problems Daughter    • Cri-du-chat syndrome Daughter    • Colon cancer Maternal Grandmother 80   • Diabetes type II Maternal Grandmother    • No Known Problems Brother    • No Known Problems Son    • Substance Abuse Neg Hx         mother,father   • Mental illness Neg Hx         mother,father   • Colon polyps Neg Hx      I have reviewed and agree with the history as documented.     E-Cigarette/Vaping   • E-Cigarette Use Never User      E-Cigarette/Vaping Substances   • Nicotine No    • THC No    • CBD No    • Flavoring No    • Other No    • Unknown No      Social History     Tobacco Use   • Smoking status: Former     Packs/day: 0.25     Years: 29.00     Total pack years: 7.25     Types: Cigarettes     Start date: 200     Quit date: 12/10/2019     Years since quitting: 3.6   • Smokeless tobacco: Never   Vaping Use   • Vaping Use: Never used   Substance Use Topics   • Alcohol use: Not Currently     Alcohol/week: 20.0 standard drinks of alcohol     Types: 20 Cans of beer per week     Comment: about 6 coors light every night, stopped in 2019   • Drug use: No       Review of Systems   Constitutional: Positive for fatigue. Negative for fever. HENT: Negative for rhinorrhea and sore throat. Eyes: Negative for photophobia and visual disturbance. Respiratory: Positive for shortness of breath. Cardiovascular: Negative for chest pain. Gastrointestinal: Negative for abdominal pain, diarrhea, nausea and vomiting. Genitourinary: Negative for difficulty urinating and dysuria. Musculoskeletal: Negative for neck pain and neck stiffness. Neurological: Positive for light-headedness. Negative for dizziness, syncope and headaches. Physical Exam  Physical Exam  Vitals and nursing note reviewed. Constitutional:       General: She is in acute distress. Appearance: She is well-developed. Comments: Significant increased work of breathing on initial examination. Significant effort to sit up. Patient unable to speak more than 2-3 words without taking breaths. HENT:      Head: Normocephalic and atraumatic. Eyes:      Conjunctiva/sclera: Conjunctivae normal.   Cardiovascular:      Rate and Rhythm: Normal rate and regular rhythm. Heart sounds: No murmur heard. Pulmonary:      Effort: Pulmonary effort is normal. No respiratory distress. Breath sounds: Decreased breath sounds (decreased breath sounds throughoout all lung fields) present. Abdominal:      Palpations: Abdomen is soft. Tenderness: There is no abdominal tenderness. Musculoskeletal:         General: No swelling. Cervical back: Neck supple. Right lower leg: No tenderness. Edema (2+) present. Left lower leg: No tenderness. Edema (2+) present. Skin:     General: Skin is warm and dry. Capillary Refill: Capillary refill takes less than 2 seconds. Neurological:      General: No focal deficit present. Mental Status: She is alert and oriented to person, place, and time.    Psychiatric:         Mood and Affect: Mood normal.         Vital Signs  ED Triage Vitals [07/22/23 2040]   Temperature Pulse Respirations Blood Pressure SpO2   98.5 °F (36.9 °C) 81 20 142/61 93 %      Temp Source Heart Rate Source Patient Position - Orthostatic VS BP Location FiO2 (%)   Oral Monitor -- -- --      Pain Score       --           Vitals:    07/22/23 2040   BP: 142/61   Pulse: 81         Visual Acuity      ED Medications  Medications   magnesium sulfate 2 g/50 mL IVPB (premix) 2 g (2 g Intravenous New Bag 7/23/23 0001)   azithromycin (ZITHROMAX) 500 mg in sodium chloride 0.9% 250mL IVPB 500 mg (has no administration in time range)   albuterol inhalation solution 10 mg (10 mg Nebulization Given 7/22/23 2129)     And   ipratropium (ATROVENT) 0.02 % inhalation solution 1 mg (1 mg Nebulization Given 7/22/23 2129)     And   sodium chloride 0.9 % inhalation solution 3 mL (3 mL Nebulization Given 7/22/23 2129)   methylPREDNISolone sodium succinate (Solu-MEDROL) injection 125 mg (125 mg Intravenous Given 7/22/23 2355)   furosemide (LASIX) injection 40 mg (40 mg Intravenous Given 7/22/23 2355)       Diagnostic Studies  Results Reviewed     Procedure Component Value Units Date/Time    HS Troponin I 4hr [[id number]]     Lab Status: No result Specimen: Blood     B-Type Natriuretic Peptide(BNP) [[id number]]  (Abnormal) Collected: 07/22/23 2223    Lab Status: Final result Specimen: Blood from Arm, Right Updated: 07/22/23 2258      pg/mL     HS Troponin I 2hr [[id number]]     Lab Status: No result Specimen: Blood     HS Troponin 0hr (reflex protocol) [[id number]]  (Normal) Collected: 07/22/23 2223    Lab Status: Final result Specimen: Blood from Arm, Right Updated: 07/22/23 2251     hs TnI 0hr 6 ng/L     Comprehensive metabolic panel [[id number]]  (Abnormal) Collected: 07/22/23 2223    Lab Status: Final result Specimen: Blood from Arm, Right Updated: 07/22/23 2246     Sodium 136 mmol/L      Potassium 3.5 mmol/L      Chloride 92 mmol/L      CO2 37 mmol/L      ANION GAP 7 mmol/L      BUN 10 mg/dL      Creatinine 0.67 mg/dL      Glucose 293 mg/dL      Calcium 8.3 mg/dL      AST 21 U/L      ALT 54 U/L      Alkaline Phosphatase 148 U/L      Total Protein 7.0 g/dL      Albumin 4.2 g/dL      Total Bilirubin 0.48 mg/dL      eGFR 93 ml/min/1.73sq m     Narrative:      National Kidney Disease Foundation guidelines for Chronic Kidney Disease (CKD):   •  Stage 1 with normal or high GFR (GFR > 90 mL/min/1.73 square meters)  •  Stage 2 Mild CKD (GFR = 60-89 mL/min/1.73 square meters)  •  Stage 3A Moderate CKD (GFR = 45-59 mL/min/1.73 square meters)  •  Stage 3B Moderate CKD (GFR = 30-44 mL/min/1.73 square meters)  •  Stage 4 Severe CKD (GFR = 15-29 mL/min/1.73 square meters)  •  Stage 5 End Stage CKD (GFR <15 mL/min/1.73 square meters)  Note: GFR calculation is accurate only with a steady state creatinine    Magnesium [[id number]]  (Normal) Collected: 07/22/23 2223    Lab Status: Final result Specimen: Blood from Arm, Right Updated: 07/22/23 2246     Magnesium 2.1 mg/dL     CBC and differential [[id number]]  (Abnormal) Collected: 07/22/23 2223    Lab Status: Final result Specimen: Blood from Arm, Right Updated: 07/22/23 2230     WBC 14.51 Thousand/uL      RBC 4.76 Million/uL      Hemoglobin 9.3 g/dL      Hematocrit 32.6 %      MCV 69 fL      MCH 19.5 pg      MCHC 28.5 g/dL      RDW 19.1 %      MPV 10.9 fL      Platelets 519 Thousands/uL      nRBC 0 /100 WBCs      Neutrophils Relative 79 %      Immat GRANS % 1 %      Lymphocytes Relative 13 %      Monocytes Relative 5 %      Eosinophils Relative 2 %      Basophils Relative 0 %      Neutrophils Absolute 11.52 Thousands/µL      Immature Grans Absolute 0.08 Thousand/uL      Lymphocytes Absolute 1.81 Thousands/µL      Monocytes Absolute 0.76 Thousand/µL      Eosinophils Absolute 0.28 Thousand/µL      Basophils Absolute 0.06 Thousands/µL     POCT Blood Gas (CG8+) [[id number]]  (Abnormal) Collected: 07/22/23 2108    Lab Status: Final result Specimen: Venous Updated: 07/22/23 2111     ph, Brian ISTAT 7.398     pCO2, Brian i-STAT 59.1 mm HG      pO2, Brian i-STAT 29.0 mm HG      BE, i-STAT 10 mmol/L      HCO3, Brian i-STAT 36.4 mmol/L      CO2, i-STAT 38 mmol/L      O2 Sat, i-STAT 54 %      SODIUM, I-STAT 135 mmol/l      Potassium, i-STAT 3.6 mmol/L      Calcium, Ionized i-STAT 1.05 mmol/L      Hct, i-STAT 33 %      Hgb, i-STAT 11.2 g/dl      Glucose, i-STAT 303 mg/dl      Specimen Type VENOUS    FLU/RSV/COVID - if FLU/RSV clinically relevant [[id number]]     Lab Status: No result Specimen: Nares from Nose                  XR chest 1 view portable   ED Interpretation by Narayan West PA-C (07/22 2331)   ED Interpretation: Cardiomegaly, mild pulmonary venous congestion. Procedures  ECG 12 Lead Documentation Only    Date/Time: 7/23/2023 12:31 AM    Performed by: Narayan West PA-C  Authorized by: Narayan West PA-C    Rate:     ECG rate:  83    ECG rate assessment: normal    Rhythm:     Rhythm: sinus rhythm    Ectopy:     Ectopy: none    QRS:     QRS axis:  Normal  Conduction:     Conduction: normal    ST segments:     ST segments:  Normal  T waves:     T waves: normal               ED Course  ED Course as of 07/23/23 0037   Sat Jul 22, 2023 2324 Patient reports significant improvement from initial ED evaluation. Critical care AP evaluated patient, patient weaned off BiPAP. Patient did desat when walking back from commode, currently on 6 L/min, comfortable and conversational at this time. Medical Decision Making  70-year-old male presents the ED for evaluation of worsening SOB x2 weeks. Patient with increased work of breathing on initial exam, pursed with breathing and unable to speak in complete sentences. Patient given heart neb in ED, started on BiPAP. Labs that show leukocytosis of 15, mildly elevated BNP, labs otherwise acutely unremarkable. Patient proved status post heart neb, weaned off BiPAP to 6 L/min nasal cannula. Discussed case with SLIM who was agreeable to admission. Amount and/or Complexity of Data Reviewed  Labs: ordered. Radiology: ordered and independent interpretation performed. Risk  Prescription drug management. Decision regarding hospitalization. Disposition  Final diagnoses:   Acute exacerbation of chronic obstructive pulmonary disease (COPD) ([address])   Hypoxia   CHF (congestive heart failure) ([address])     Time reflects when diagnosis was documented in both MDM as applicable and the Disposition within this note     Time User Action Codes Description Comment    7/22/2023 11:37 PM Angelica Martines Add [J44.1] Acute exacerbation of chronic obstructive pulmonary disease (COPD) ([address])     7/22/2023 11:39 PM Angelica Martines Add [R09.02] Hypoxia     7/22/2023 11:39 PM Angelica Martines Add [I50.9] CHF (congestive heart failure) [facility])       ED Disposition     ED Disposition   Admit    Condition   Stable    Date/Time   Sat Jul 22, 2023 11:40 PM    Comment   Case was discussed with Chapman Osler and the patient's admission status was agreed to be Admission Status: inpatient status to the service of Dr. Jackie Dakins. Follow-up Information    None         Patient's Medications   Discharge Prescriptions    No medications on file       No discharge procedures on file.     PDMP Review       Value Time User    PDMP Reviewed  Yes 6/30/2023  5:15 PM Pao De Jesus MD          ED Provider  Electronically Signed by           Angelica Martines ROSALIO  07/23/23 0037

## 2023-07-24 ENCOUNTER — PATIENT OUTREACH (OUTPATIENT)
Dept: FAMILY MEDICINE CLINIC | Facility: HOSPITAL | Age: 63
End: 2023-07-24

## 2023-07-24 LAB
ALBUMIN SERPL BCP-MCNC: 3.8 G/DL (ref 3.5–5)
ALP SERPL-CCNC: 119 U/L (ref 34–104)
ALT SERPL W P-5'-P-CCNC: 34 U/L (ref 7–52)
ANION GAP SERPL CALCULATED.3IONS-SCNC: 6 MMOL/L
AST SERPL W P-5'-P-CCNC: 10 U/L (ref 13–39)
ATRIAL RATE: 83 BPM
BASOPHILS # BLD AUTO: 0.01 THOUSANDS/ÂΜL (ref 0–0.1)
BASOPHILS NFR BLD AUTO: 0 % (ref 0–1)
BILIRUB SERPL-MCNC: 0.48 MG/DL (ref 0.2–1)
BUN SERPL-MCNC: 17 MG/DL (ref 5–25)
CALCIUM SERPL-MCNC: 8.6 MG/DL (ref 8.4–10.2)
CHLORIDE SERPL-SCNC: 91 MMOL/L (ref 96–108)
CO2 SERPL-SCNC: 40 MMOL/L (ref 21–32)
CREAT SERPL-MCNC: 0.61 MG/DL (ref 0.6–1.3)
EOSINOPHIL # BLD AUTO: 0 THOUSAND/ÂΜL (ref 0–0.61)
EOSINOPHIL NFR BLD AUTO: 0 % (ref 0–6)
ERYTHROCYTE [DISTWIDTH] IN BLOOD BY AUTOMATED COUNT: 19.1 % (ref 11.6–15.1)
GFR SERPL CREATININE-BSD FRML MDRD: 96 ML/MIN/1.73SQ M
GLUCOSE SERPL-MCNC: 256 MG/DL (ref 65–140)
GLUCOSE SERPL-MCNC: 287 MG/DL (ref 65–140)
GLUCOSE SERPL-MCNC: 335 MG/DL (ref 65–140)
GLUCOSE SERPL-MCNC: 359 MG/DL (ref 65–140)
GLUCOSE SERPL-MCNC: 373 MG/DL (ref 65–140)
HCT VFR BLD AUTO: 29.9 % (ref 34.8–46.1)
HGB BLD-MCNC: 8.5 G/DL (ref 11.5–15.4)
IMM GRANULOCYTES # BLD AUTO: 0.12 THOUSAND/UL (ref 0–0.2)
IMM GRANULOCYTES NFR BLD AUTO: 1 % (ref 0–2)
LYMPHOCYTES # BLD AUTO: 0.67 THOUSANDS/ÂΜL (ref 0.6–4.47)
LYMPHOCYTES NFR BLD AUTO: 5 % (ref 14–44)
MCH RBC QN AUTO: 19.1 PG (ref 26.8–34.3)
MCHC RBC AUTO-ENTMCNC: 28.4 G/DL (ref 31.4–37.4)
MCV RBC AUTO: 67 FL (ref 82–98)
MONOCYTES # BLD AUTO: 0.46 THOUSAND/ÂΜL (ref 0.17–1.22)
MONOCYTES NFR BLD AUTO: 3 % (ref 4–12)
NEUTROPHILS # BLD AUTO: 13.06 THOUSANDS/ÂΜL (ref 1.85–7.62)
NEUTS SEG NFR BLD AUTO: 91 % (ref 43–75)
NRBC BLD AUTO-RTO: 0 /100 WBCS
P AXIS: 90 DEGREES
PLATELET # BLD AUTO: 300 THOUSANDS/UL (ref 149–390)
PMV BLD AUTO: 11 FL (ref 8.9–12.7)
POTASSIUM SERPL-SCNC: 3.8 MMOL/L (ref 3.5–5.3)
PR INTERVAL: 174 MS
PROT SERPL-MCNC: 6.3 G/DL (ref 6.4–8.4)
QRS AXIS: 94 DEGREES
QRSD INTERVAL: 68 MS
QT INTERVAL: 376 MS
QTC INTERVAL: 441 MS
RBC # BLD AUTO: 4.44 MILLION/UL (ref 3.81–5.12)
SODIUM SERPL-SCNC: 137 MMOL/L (ref 135–147)
T WAVE AXIS: 85 DEGREES
VENTRICULAR RATE: 83 BPM
WBC # BLD AUTO: 14.32 THOUSAND/UL (ref 4.31–10.16)

## 2023-07-24 PROCEDURE — 93010 ELECTROCARDIOGRAM REPORT: CPT | Performed by: INTERNAL MEDICINE

## 2023-07-24 PROCEDURE — 80053 COMPREHEN METABOLIC PANEL: CPT | Performed by: HOSPITALIST

## 2023-07-24 PROCEDURE — 36415 COLL VENOUS BLD VENIPUNCTURE: CPT | Performed by: HOSPITALIST

## 2023-07-24 PROCEDURE — 94760 N-INVAS EAR/PLS OXIMETRY 1: CPT

## 2023-07-24 PROCEDURE — 94640 AIRWAY INHALATION TREATMENT: CPT

## 2023-07-24 PROCEDURE — 99232 SBSQ HOSP IP/OBS MODERATE 35: CPT | Performed by: PHYSICIAN ASSISTANT

## 2023-07-24 PROCEDURE — 85025 COMPLETE CBC W/AUTO DIFF WBC: CPT | Performed by: HOSPITALIST

## 2023-07-24 PROCEDURE — 99222 1ST HOSP IP/OBS MODERATE 55: CPT | Performed by: INTERNAL MEDICINE

## 2023-07-24 PROCEDURE — 82948 REAGENT STRIP/BLOOD GLUCOSE: CPT

## 2023-07-24 PROCEDURE — 99232 SBSQ HOSP IP/OBS MODERATE 35: CPT | Performed by: INTERNAL MEDICINE

## 2023-07-24 PROCEDURE — 94660 CPAP INITIATION&MGMT: CPT

## 2023-07-24 RX ORDER — INSULIN GLARGINE 100 [IU]/ML
50 INJECTION, SOLUTION SUBCUTANEOUS
Status: DISCONTINUED | OUTPATIENT
Start: 2023-07-24 | End: 2023-07-26 | Stop reason: HOSPADM

## 2023-07-24 RX ORDER — INSULIN LISPRO 100 [IU]/ML
2-12 INJECTION, SOLUTION INTRAVENOUS; SUBCUTANEOUS
Status: DISCONTINUED | OUTPATIENT
Start: 2023-07-24 | End: 2023-07-26 | Stop reason: HOSPADM

## 2023-07-24 RX ORDER — BUDESONIDE 0.5 MG/2ML
0.5 INHALANT ORAL
Status: DISCONTINUED | OUTPATIENT
Start: 2023-07-24 | End: 2023-07-26 | Stop reason: HOSPADM

## 2023-07-24 RX ORDER — INSULIN GLARGINE 100 [IU]/ML
40 INJECTION, SOLUTION SUBCUTANEOUS
Status: DISCONTINUED | OUTPATIENT
Start: 2023-07-24 | End: 2023-07-24

## 2023-07-24 RX ORDER — INSULIN LISPRO 100 [IU]/ML
20 INJECTION, SOLUTION INTRAVENOUS; SUBCUTANEOUS
Status: DISCONTINUED | OUTPATIENT
Start: 2023-07-24 | End: 2023-07-26 | Stop reason: HOSPADM

## 2023-07-24 RX ORDER — METHYLPREDNISOLONE SODIUM SUCCINATE 40 MG/ML
40 INJECTION, POWDER, LYOPHILIZED, FOR SOLUTION INTRAMUSCULAR; INTRAVENOUS EVERY 12 HOURS SCHEDULED
Status: DISCONTINUED | OUTPATIENT
Start: 2023-07-24 | End: 2023-07-25

## 2023-07-24 RX ADMIN — INSULIN LISPRO 20 UNITS: 100 INJECTION, SOLUTION INTRAVENOUS; SUBCUTANEOUS at 11:31

## 2023-07-24 RX ADMIN — PANTOPRAZOLE SODIUM 40 MG: 40 TABLET, DELAYED RELEASE ORAL at 05:45

## 2023-07-24 RX ADMIN — IPRATROPIUM BROMIDE 0.5 MG: 0.5 SOLUTION RESPIRATORY (INHALATION) at 13:26

## 2023-07-24 RX ADMIN — POTASSIUM CHLORIDE 20 MEQ: 1500 TABLET, EXTENDED RELEASE ORAL at 09:12

## 2023-07-24 RX ADMIN — FLUTICASONE PROPIONATE 1 SPRAY: 50 SPRAY, METERED NASAL at 09:13

## 2023-07-24 RX ADMIN — INSULIN LISPRO 24 UNITS: 100 INJECTION, SOLUTION INTRAVENOUS; SUBCUTANEOUS at 07:46

## 2023-07-24 RX ADMIN — ACETAMINOPHEN 650 MG: 325 TABLET, FILM COATED ORAL at 00:24

## 2023-07-24 RX ADMIN — BUDESONIDE 0.5 MG: 0.5 INHALANT ORAL at 20:04

## 2023-07-24 RX ADMIN — INSULIN LISPRO 16 UNITS: 100 INJECTION, SOLUTION INTRAVENOUS; SUBCUTANEOUS at 07:46

## 2023-07-24 RX ADMIN — BUDESONIDE 0.5 MG: 0.5 INHALANT RESPIRATORY (INHALATION) at 08:05

## 2023-07-24 RX ADMIN — FORMOTEROL FUMARATE DIHYDRATE 20 MCG: 20 SOLUTION RESPIRATORY (INHALATION) at 20:04

## 2023-07-24 RX ADMIN — ESCITALOPRAM OXALATE 20 MG: 20 TABLET ORAL at 09:11

## 2023-07-24 RX ADMIN — METOPROLOL SUCCINATE 100 MG: 50 TABLET, EXTENDED RELEASE ORAL at 09:10

## 2023-07-24 RX ADMIN — IPRATROPIUM BROMIDE 0.5 MG: 0.5 SOLUTION RESPIRATORY (INHALATION) at 20:04

## 2023-07-24 RX ADMIN — LORATADINE 20 MG: 10 TABLET ORAL at 09:11

## 2023-07-24 RX ADMIN — METHYLPREDNISOLONE SODIUM SUCCINATE 40 MG: 40 INJECTION, POWDER, FOR SOLUTION INTRAMUSCULAR; INTRAVENOUS at 05:45

## 2023-07-24 RX ADMIN — LEVALBUTEROL HYDROCHLORIDE 1.25 MG: 1.25 SOLUTION RESPIRATORY (INHALATION) at 13:26

## 2023-07-24 RX ADMIN — LEVALBUTEROL HYDROCHLORIDE 1.25 MG: 1.25 SOLUTION RESPIRATORY (INHALATION) at 20:04

## 2023-07-24 RX ADMIN — FLUTICASONE PROPIONATE 1 SPRAY: 50 SPRAY, METERED NASAL at 21:15

## 2023-07-24 RX ADMIN — TORSEMIDE 60 MG: 20 TABLET ORAL at 17:14

## 2023-07-24 RX ADMIN — INSULIN LISPRO 6 UNITS: 100 INJECTION, SOLUTION INTRAVENOUS; SUBCUTANEOUS at 21:15

## 2023-07-24 RX ADMIN — IPRATROPIUM BROMIDE 0.5 MG: 0.5 SOLUTION RESPIRATORY (INHALATION) at 08:06

## 2023-07-24 RX ADMIN — TORSEMIDE 60 MG: 20 TABLET ORAL at 09:11

## 2023-07-24 RX ADMIN — INSULIN GLARGINE 50 UNITS: 100 INJECTION, SOLUTION SUBCUTANEOUS at 21:14

## 2023-07-24 RX ADMIN — POTASSIUM CHLORIDE 20 MEQ: 1500 TABLET, EXTENDED RELEASE ORAL at 17:14

## 2023-07-24 RX ADMIN — INSULIN LISPRO 6 UNITS: 100 INJECTION, SOLUTION INTRAVENOUS; SUBCUTANEOUS at 17:14

## 2023-07-24 RX ADMIN — ATORVASTATIN CALCIUM 40 MG: 40 TABLET, FILM COATED ORAL at 09:11

## 2023-07-24 RX ADMIN — APIXABAN 5 MG: 5 TABLET, FILM COATED ORAL at 09:11

## 2023-07-24 RX ADMIN — MONTELUKAST 10 MG: 10 TABLET, FILM COATED ORAL at 21:14

## 2023-07-24 RX ADMIN — APIXABAN 5 MG: 5 TABLET, FILM COATED ORAL at 17:14

## 2023-07-24 RX ADMIN — TRAZODONE HYDROCHLORIDE 150 MG: 150 TABLET ORAL at 21:14

## 2023-07-24 RX ADMIN — LEVALBUTEROL HYDROCHLORIDE 1.25 MG: 1.25 SOLUTION RESPIRATORY (INHALATION) at 08:05

## 2023-07-24 RX ADMIN — METHYLPREDNISOLONE SODIUM SUCCINATE 40 MG: 40 INJECTION, POWDER, FOR SOLUTION INTRAMUSCULAR; INTRAVENOUS at 21:14

## 2023-07-24 RX ADMIN — INSULIN LISPRO 6 UNITS: 100 INJECTION, SOLUTION INTRAVENOUS; SUBCUTANEOUS at 11:31

## 2023-07-24 RX ADMIN — INSULIN LISPRO 20 UNITS: 100 INJECTION, SOLUTION INTRAVENOUS; SUBCUTANEOUS at 17:14

## 2023-07-24 NOTE — ASSESSMENT & PLAN NOTE
Lab Results   Component Value Date    HGBA1C 9.9 (H) 05/11/2023       Recent Labs     07/23/23 2025 07/23/23  2101 07/24/23  0744 07/24/23  1124   POCGLU 468* 437* 373* 359*       Blood Sugar Average: Last 72 hrs:  · (P) [id number]   · Home regimen: Glargine 30 units daily at bedtime, glulisine 24 units with breakfast, 12 units with lunch and dinner  · Significant steroid-induced hyperglycemia.   Increase Lantus to 50 units daily at bedtime, humalog 20 units TID with meals, SSI + accuchek  · Diabetic diet  · Consult endocrinology

## 2023-07-24 NOTE — UTILIZATION REVIEW
Initial Clinical Review    Admission: Date/Time/Statement:   Admission Orders (From admission, onward)     Ordered        07/22/23 2340  INPATIENT ADMISSION  Once                      Orders Placed This Encounter   Procedures   • INPATIENT ADMISSION     Standing Status:   Standing     Number of Occurrences:   1     Order Specific Question:   Level of Care     Answer:   Med Surg [16]     Order Specific Question:   Estimated length of stay     Answer:   More than 2 Midnights     Order Specific Question:   Certification     Answer:   I certify that inpatient services are medically necessary for this patient for a duration of greater than two midnights. See H&P and MD Progress Notes for additional information about the patient's course of treatment. ED Arrival Information     Expected   -    Arrival   7/22/2023 20:32    Acuity   Urgent            Means of arrival   Wheelchair    Escorted by   [place]    Service   Hospitalist    Admission type   Emergency            Arrival complaint   sob           Chief Complaint   Patient presents with   • Shortness of Breath     Pt states that she has been feeling sob for 2 weeks. Pt states that she has been getting worse everyday. Pt states that today she couldn't even get up and move around. Pt is on 4L NC all the time        Initial Presentation: 57 y.o. female PMH of COPD, asthma, chronic hypoxia on 4 L, CHF, obesity, WILMA on CPAP, PAF, DM2 who presents from home due to worsening shortness of breath that has been ongoing for about 2 weeks. Called her pulmonologist on 7/14. Prescription written for Z-matty and prednisone taper. Patient reports compliance and had improvement in symptoms but after steroid was complete symptoms returned. In ED place don bipap , able to wean down to 6l after nebs and lasix in ED . Pt is chronically on 4l . Admitted IP status w/ asthma w/ COPD exacerbation plan for nebs , solumedrol , inhalers , consult pulm , wean O2 to baseline .  CHF  given IV lasix in ED , cont home torsemide and monitor I&O and weights . afib cont eliquis , lopressor . DM SSI and monitor . PE: dec BS      7/23 Pulm Consult   Acute/chronic hypoxic and chronic hypercarbic respiratory failure  Titrate O2 to keep SpO2 >88%, IS, OOB-chair. Will give trial of BiPAP while admitted - 16/8cmH2O, may consider BiPAP approval prior to discharge, is to get BiPAP titration as outpatient. Cont solumedrol , nebs , monitor off abx . Date: 7/24   Day 2: improved since admission . Still w/ some SOB and prod cough . LE swelling has improved . On 4l NC O2 sat 94 % . Dec breath sounds . Trace pitting LE edema cont IV solumedrol . 7/24 Pulm Note   On 4l , maintain >88 % . Cont nebs , IV solumedrol dec to q12hr . bipap trial last evening . Coughing up some clear sputum .    ED Triage Vitals   Temperature Pulse Respirations Blood Pressure SpO2   07/22/23 2040 07/22/23 2040 07/22/23 2040 07/22/23 2040 07/22/23 2040   98.5 °F (36.9 °C) 81 20 142/61 93 %      Temp Source Heart Rate Source Patient Position - Orthostatic VS BP Location FiO2 (%)   07/22/23 2040 07/22/23 2040 07/23/23 0915 07/23/23 0915 --   Oral Monitor Lying Right arm       Pain Score       07/24/23 0024       4          Wt Readings from Last 1 Encounters:   07/22/23 129 kg (284 lb)     Additional Vital Signs:   07/24/23 1126 -- 83 18 120/63 85 94 % -- -- None (Room air) -- Sitting   07/24/23 0811 -- -- -- -- -- 94 % 36 4 L/min Nasal cannula -- --   07/24/23 0700 -- 70 -- 139/63 -- 97 % -- -- -- -- --   07/24/23 0230 -- -- -- -- -- 97 % -- -- -- Face mask --   07/23/23 2206 -- -- -- -- -- 94 % -- -- -- Face mask --   07/23/23 1949 -- -- -- -- -- 98 % -- -- -- -- --   07/23/23 1932 -- -- -- -- -- 97 % 36 4 L/min Nasal cannula -- --   07/23/23 1745 -- 89 -- 134/63 90 92 % -- -- -- -- --   07/23/23 1445 -- 87 -- 127/61 88 97 % -- -- -- -- --   07/23/23 0915 -- 96 22 129/58 84 95 % -- -- Nasal cannula -- Lying   07/23/23 0909 -- 96 -- 129/58 -- -- -- -- -- -- --   07/23/23 0756 -- -- -- -- -- -- 36 4 L/min Nasal cannula -- --   07/23/23 0400 -- 86 25 Abnormal  130/60 86 95 % -- -- CPAP -- --   07/23/23 0154 -- -- -- -- -- 96 % -- -- -- Face mask --   07/23/23 0130 -- 97 27 Abnormal  -- -- 91 % 44 6 L/min Nasal cannula -- --   07/22/23 2158 -- -- -- -- -- 98 % -- -- -- Face mask --   07/22/23 2129 -- -- -- -- -- 92 % --           Pertinent Labs/Diagnostic Test Results:   XR chest 1 view portable   ED Interpretation by Nilesh Solis PA-C (07/22 2331)   ED Interpretation: Cardiomegaly, mild pulmonary venous congestion. Final Result by Sammi Simms MD (07/23 1108)      No acute cardiopulmonary disease.                   Workstation performed: [id number]           Results from last 7 days   Lab Units 07/23/23  0130   SARS-COV-2  Negative     Results from last 7 days   Lab Units 07/24/23  0545 07/23/23 0532 07/22/23 2223 07/22/23 2108   WBC Thousand/uL 14.32* 19.40* 14.51*  --    HEMOGLOBIN g/dL 8.5* 9.2* 9.3*  --    I STAT HEMOGLOBIN g/dl  --   --   --  11.2*   HEMATOCRIT % 29.9* 33.2* 32.6*  --    HEMATOCRIT, ISTAT %  --   --   --  33*   PLATELETS Thousands/uL 300 308 297  --    NEUTROS ABS Thousands/µL 13.06* 18.28* 11.52*  --          Results from last 7 days   Lab Units 07/24/23  0545 07/23/23  1940 07/23/23 0532 07/22/23 2223 07/22/23 2108   SODIUM mmol/L 137 133* 139 136  --    POTASSIUM mmol/L 3.8 3.7 3.9 3.5  --    CHLORIDE mmol/L 91* 89* 94* 92*  --    CO2 mmol/L 40* 32 35* 37*  --    CO2, I-STAT mmol/L  --   --   --   --  38*   ANION GAP mmol/L 6 12 10 7  --    BUN mg/dL 17 17 12 10  --    CREATININE mg/dL 0.61 0.87 0.71 0.67  --    EGFR ml/min/1.73sq m 96 70 90 93  --    CALCIUM mg/dL 8.6 8.2* 8.2* 8.3*  --    CALCIUM, IONIZED, ISTAT mmol/L  --   --   --   --  1.05*   MAGNESIUM mg/dL  --   --   --  2.1  --      Results from last 7 days   Lab Units 07/24/23  0545 07/22/23  2223   AST U/L 10* 21   ALT U/L 34 54*   ALK PHOS U/L 119* 148*   TOTAL PROTEIN g/dL 6.3* 7.0   ALBUMIN g/dL 3.8 4.2   TOTAL BILIRUBIN mg/dL 0.48 0.48     Results from last 7 days   Lab Units 07/24/23  1124 07/24/23  0744 07/23/23  2101 07/23/23 2025 07/23/23  1919 07/23/23  1735 07/23/23  1231 07/23/23  0858 07/23/23  0108   POC GLUCOSE mg/dl 359* 373* 437* 468* >500* >500* 410* 366* 378*     Results from last 7 days   Lab Units 07/24/23  0545 07/23/23  1940 07/23/23  0532 07/22/23  2223   GLUCOSE RANDOM mg/dL 335* 507* 300* 293*     BETA-HYDROXYBUTYRATE   Date Value Ref Range Status   06/03/2023 0.2 <0.6 mmol/L Final      Results from last 7 days   Lab Units 07/22/23 2108   PH, DARVIN I-STAT  7.398   PCO2, DARVIN ISTAT mm HG 59.1*   PO2, DARVIN ISTAT mm HG 29.0*   HCO3, DARVIN ISTAT mmol/L 36.4*   I STAT BASE EXC mmol/L 10*   I STAT O2 SAT % 54*     Results from last 7 days   Lab Units 07/22/23 2223   HS TNI 0HR ng/L 6     Results from last 7 days   Lab Units 07/23/23  0532   PROCALCITONIN ng/ml 0.06     Results from last 7 days   Lab Units 07/22/23  2223   BNP pg/mL 112*         Results from last 7 days   Lab Units 07/23/23  0130   INFLUENZA A PCR  Negative   INFLUENZA B PCR  Negative   RSV PCR  Negative   ED Treatment:   Medication Administration from 07/22/2023 2032 to 07/24/2023 1259       Date/Time Order Dose Route Action     07/22/2023 2129 EDT albuterol inhalation solution 10 mg 10 mg Nebulization Given     07/22/2023 2129 EDT ipratropium (ATROVENT) 0.02 % inhalation solution 1 mg 1 mg Nebulization Given     07/22/2023 2129 EDT sodium chloride 0.9 % inhalation solution 3 mL 3 mL Nebulization Given     07/22/2023 2355 EDT methylPREDNISolone sodium succinate (Solu-MEDROL) injection 125 mg 125 mg Intravenous Given     07/23/2023 0001 EDT magnesium sulfate 2 g/50 mL IVPB (premix) 2 g 2 g Intravenous New Bag     07/22/2023 2355 EDT furosemide (LASIX) injection 40 mg 40 mg Intravenous Given     07/23/2023 0121 EDT azithromycin (ZITHROMAX) 500 mg in sodium chloride 0.9% 250mL IVPB 500 mg 500 mg Intravenous New Bag     07/24/2023 0911 EDT atorvastatin (LIPITOR) tablet 40 mg 40 mg Oral Given     07/23/2023 0907 EDT atorvastatin (LIPITOR) tablet 40 mg 40 mg Oral Given     07/24/2023 0805 EDT budesonide (PULMICORT) inhalation solution 0.5 mg 0.5 mg Nebulization Given     07/23/2023 1913 EDT budesonide (PULMICORT) inhalation solution 0.5 mg 0.5 mg Nebulization Given     07/23/2023 0900 EDT budesonide (PULMICORT) inhalation solution 0.5 mg 0 mg Nebulization Override Pull     07/24/2023 0911 EDT apixaban (ELIQUIS) tablet 5 mg 5 mg Oral Given     07/23/2023 1747 EDT apixaban (ELIQUIS) tablet 5 mg 5 mg Oral Given     07/23/2023 0908 EDT apixaban (ELIQUIS) tablet 5 mg 5 mg Oral Given     07/24/2023 0911 EDT escitalopram (LEXAPRO) tablet 20 mg 20 mg Oral Given     07/23/2023 0907 EDT escitalopram (LEXAPRO) tablet 20 mg 20 mg Oral Given     07/24/2023 0913 EDT fluticasone (FLONASE) 50 mcg/act nasal spray 1 spray 1 spray Nasal Given     07/23/2023 2130 EDT fluticasone (FLONASE) 50 mcg/act nasal spray 1 spray 1 spray Nasal Given     07/23/2023 1116 EDT fluticasone (FLONASE) 50 mcg/act nasal spray 1 spray 1 spray Nasal Given     07/23/2023 0123 EDT insulin glargine (LANTUS) subcutaneous injection 30 Units 0.3 mL 30 Units Subcutaneous Given     07/23/2023 1238 EDT insulin lispro (HumaLOG) 100 units/mL subcutaneous injection 12 Units 12 Units Subcutaneous Given     07/24/2023 0746 EDT insulin lispro (HumaLOG) 100 units/mL subcutaneous injection 24 Units 24 Units Subcutaneous Given     07/23/2023 0904 EDT insulin lispro (HumaLOG) 100 units/mL subcutaneous injection 24 Units 24 Units Subcutaneous Given     07/24/2023 0911 EDT loratadine (CLARITIN) tablet 10 mg 20 mg Oral Given     07/23/2023 0909 EDT loratadine (CLARITIN) tablet 10 mg 10 mg Oral Given     07/24/2023 0910 EDT metoprolol succinate (TOPROL-XL) 24 hr tablet 100 mg 100 mg Oral Given     07/23/2023 0909 EDT metoprolol succinate (TOPROL-XL) 24 hr tablet 100 mg 100 mg Oral Given     07/23/2023 2131 EDT montelukast (SINGULAIR) tablet 10 mg 10 mg Oral Given     07/23/2023 0127 EDT montelukast (SINGULAIR) tablet 10 mg 10 mg Oral Given     07/24/2023 0545 EDT pantoprazole (PROTONIX) EC tablet 40 mg 40 mg Oral Given     07/23/2023 0530 EDT pantoprazole (PROTONIX) EC tablet 40 mg 40 mg Oral Given     07/24/2023 0912 EDT potassium chloride (K-DUR,KLOR-CON) CR tablet 20 mEq 20 mEq Oral Given     07/23/2023 1747 EDT potassium chloride (K-DUR,KLOR-CON) CR tablet 20 mEq 20 mEq Oral Given     07/23/2023 0906 EDT potassium chloride (K-DUR,KLOR-CON) CR tablet 20 mEq 20 mEq Oral Given     07/24/2023 0911 EDT torsemide (DEMADEX) tablet 60 mg 60 mg Oral Given     07/23/2023 1747 EDT torsemide (DEMADEX) tablet 60 mg 60 mg Oral Given     07/23/2023 0908 EDT torsemide (DEMADEX) tablet 60 mg 60 mg Oral Given     07/23/2023 2131 EDT traZODone (DESYREL) tablet 150 mg 150 mg Oral Given     07/23/2023 0127 EDT traZODone (DESYREL) tablet 150 mg 150 mg Oral Given     07/24/2023 0024 EDT acetaminophen (TYLENOL) tablet 650 mg 650 mg Oral Given     07/24/2023 0806 EDT ipratropium (ATROVENT) 0.02 % inhalation solution 0.5 mg 0.5 mg Nebulization Given     07/23/2023 1931 EDT ipratropium (ATROVENT) 0.02 % inhalation solution 0.5 mg 0.5 mg Nebulization Given     07/23/2023 1435 EDT ipratropium (ATROVENT) 0.02 % inhalation solution 0.5 mg 0.5 mg Nebulization Given     07/23/2023 0758 EDT ipratropium (ATROVENT) 0.02 % inhalation solution 0.5 mg 0.5 mg Nebulization Given     07/24/2023 0805 EDT levalbuterol (XOPENEX) inhalation solution 1.25 mg 1.25 mg Nebulization Given     07/23/2023 1931 EDT levalbuterol (XOPENEX) inhalation solution 1.25 mg 1.25 mg Nebulization Given     07/23/2023 1435 EDT levalbuterol (XOPENEX) inhalation solution 1.25 mg 1.25 mg Nebulization Given     07/23/2023 0754 EDT levalbuterol (XOPENEX) inhalation solution 1.25 mg 1.25 mg Nebulization Given     07/23/2023 1949 EDT formoterol (PERFOROMIST) nebulizer solution 20 mcg 20 mcg Nebulization Given     07/23/2023 0754 EDT formoterol (PERFOROMIST) nebulizer solution 20 mcg 20 mcg Nebulization Given     07/24/2023 0545 EDT methylPREDNISolone sodium succinate (Solu-MEDROL) injection 40 mg 40 mg Intravenous Given     07/23/2023 2131 EDT methylPREDNISolone sodium succinate (Solu-MEDROL) injection 40 mg 40 mg Intravenous Given     07/23/2023 1436 EDT methylPREDNISolone sodium succinate (Solu-MEDROL) injection 40 mg 40 mg Intravenous Given     07/23/2023 0910 EDT methylPREDNISolone sodium succinate (Solu-MEDROL) injection 40 mg 40 mg Intravenous Given     07/23/2023 1237 EDT insulin lispro (HumaLOG) 100 units/mL subcutaneous injection 1-6 Units 6 Units Subcutaneous Given     07/23/2023 0905 EDT insulin lispro (HumaLOG) 100 units/mL subcutaneous injection 1-6 Units 6 Units Subcutaneous Given     07/23/2023 0138 EDT insulin lispro (HumaLOG) 100 units/mL subcutaneous injection 1-6 Units 6 Units Subcutaneous Given     07/23/2023 1823 EDT insulin lispro (HumaLOG) 100 units/mL subcutaneous injection 15 Units 15 Units Subcutaneous Given     07/23/2023 2130 EDT insulin glargine (LANTUS) subcutaneous injection 33 Units 0.33 mL 33 Units Subcutaneous Given     07/23/2023 1507 EDT furosemide (LASIX) injection 60 mg 60 mg Intravenous Given     07/24/2023 0746 EDT insulin lispro (HumaLOG) 100 units/mL subcutaneous injection 4-20 Units 16 Units Subcutaneous Given     07/23/2023 1822 EDT insulin lispro (HumaLOG) 100 units/mL subcutaneous injection 4-20 Units 20 Units Subcutaneous Given     07/23/2023 2131 EDT insulin lispro (HumaLOG) 100 units/mL subcutaneous injection 5-25 Units 25 Units Subcutaneous Given     07/23/2023 1946 EDT insulin lispro (HumaLOG) 100 units/mL subcutaneous injection 15 Units 15 Units Subcutaneous Given     07/24/2023 1131 EDT insulin lispro (HumaLOG) 100 units/mL subcutaneous injection 2-12 Units 6 Units Subcutaneous Given 07/24/2023 1131 EDT insulin lispro (HumaLOG) 100 units/mL subcutaneous injection 20 Units 20 Units Subcutaneous Given        Past Medical History:   Diagnosis Date   • Acute blood loss anemia 6/1/2021   • Acute diverticulitis 5/2/2021   • Alcohol abuse 5/21/2020   • Anemia    • Atrial fibrillation [facility])    • Cervical radiculopathy    • CHF (congestive heart failure) (HCC)    • Chronic low back pain    • Chronic obstructive asthma ([address]) 2/20/2018   • Cigarette nicotine dependence without complication 12/77/6268    Quit 12/10/2019.     • Community acquired pneumonia 5/21/2020   • Depression with anxiety 3/9/2014   • Diabetes mellitus with hyperglycemia ([address])    • Diverticulitis 6/6/2021   • Elevated liver enzymes    • GERD (gastroesophageal reflux disease)    • Hypersomnolence 10/28/2020   • Hypokalemia 12/4/2018   • Paresthesia of upper extremity    • Plantar fasciitis    • Restless legs syndrome 4/8/2014   • Sexual dysfunction    • Sleep apnea, obstructive    • Tenosynovitis of finger    • Tinea corporis    • Tobacco use 2/20/2018    Currently smoking 3-4 cigarettes daily   • Trigger middle finger of left hand 12/14/2017   • Trigger ring finger of left hand 12/14/2017   • Weakness of upper extremity      Present on Admission:  • Asthma with COPD with exacerbation ([address])  • Chronic diastolic congestive heart failure (HCC)  • Class 3 severe obesity in adult [facility])  • Acute on chronic respiratory failure with hypoxia ([facility])  • Paroxysmal atrial fibrillation (HCC)  • Obstructive sleep apnea      Admitting Diagnosis: SOB (shortness of breath) [R06.02]  Age/Sex: 57 y.o. female  Admission Orders:  Scheduled Medications:  apixaban, 5 mg, Oral, BID  atorvastatin, 40 mg, Oral, Daily  budesonide, 0.5 mg, Nebulization, BID  escitalopram, 20 mg, Oral, Daily  fluticasone, 1 spray, Nasal, BID  formoterol, 20 mcg, Nebulization, Q12H  insulin glargine, 50 Units, Subcutaneous, HS  insulin lispro, 2-12 Units, Subcutaneous, TID AC  insulin lispro, 2-12 Units, Subcutaneous, HS  insulin lispro, 20 Units, Subcutaneous, TID With Meals  ipratropium, 0.5 mg, Nebulization, TID  levalbuterol, 1.25 mg, Nebulization, TID  loratadine, 10 mg, Oral, Daily  methylPREDNISolone sodium succinate, 40 mg, Intravenous, Q12H LISETTE  metoprolol succinate, 100 mg, Oral, Daily  montelukast, 10 mg, Oral, HS  pantoprazole, 40 mg, Oral, Early Morning  potassium chloride, 20 mEq, Oral, BID  torsemide, 60 mg, Oral, BID  traZODone, 150 mg, Oral, HS      Continuous IV Infusions:     PRN Meds:  acetaminophen, 650 mg, Oral, Q6H PRN  albuterol, 2 puff, Inhalation, Q4H PRN    fingerstick ac and hs   OT PT eval   Weights   I&O   Up and OOB     IP CONSULT TO PULMONOLOGY  IP CONSULT TO CASE MANAGEMENT  IP CONSULT TO ENDOCRINOLOGY    Network Utilization Review Department  ATTENTION: Please call with any questions or concerns to [phone] and carefully listen to the prompts so that you are directed to the right person. All voicemails are confidential.  Dante Amen all requests for admission clinical reviews, approved or denied determinations and any other requests to dedicated fax number below belonging to the campus where the patient is receiving treatment.  List of dedicated fax numbers for the Facilities:  [place] DENIALS (Administrative/Medical Necessity) [phone] [address] [facility] (Maternity/NICU/Pediatrics) [phone]   [address] [place] [address] Drive [phone]   [facility] [address] [facility] [phone]   [facility] [id number] [place] [address] [phone]   [address] [place] [address] [phone] [address] [facility] [phone]

## 2023-07-24 NOTE — CONSULTS
Consultation - Tonya Mcconnell 57 y.o. female MRN: [id number]    Unit/Bed#: -01 Encounter: [id number]      Assessment/Plan     Assessment: This is a 57y.o.-year-old female with diabetes with hyperglycemia. 1.  Type 2 diabetes, insulin requiring. Outpatient hemoglobin A1c in May of 9.9% does demonstrate uncontrolled diabetes. Insulin adjustments are being made but she is repeatedly on steroids for her lung issues which does make it difficult to control her diabetes. She is currently on IV Solu-Medrol 40 mg every 12 hours. Blood sugars have been 200-400 in hospital over the last 12 hours or so. Insulin needs to be increased to account for the steroid dosage. 2.  Exacerbation of COPD. Treatment per primary team.    3.  Diabetic neuropathy. Plan:  1. Agree with increasing Lantus insulin to 50 units daily while on IV Solu-Medrol. 2.  Continue Humalog insulin 20 units with each meal with sliding scale. 3.  While in hospital, hold Ozempic and metformin. 4.  Continue to check blood sugars at least 4 times daily. CC: Diabetes Consult    History of Present Illness     HPI: Tonya Mcconnell is a 57y.o. year old female with type 2 diabetes for 9 years admitted with COPD exacerbation. She is on IV steroids and has high sugars and endocrine is asked to consult for diabetes care. She is on oral agents and insulin at home. She was taking Lantus insulin 30 units daily, Apidra insulin 24 units of breakfast, 12 units at lunch and supper, metformin  mg 2 tablets twice a day, and Ozempic 0.5 mg once a week She admits to polyuria, polydipsia, nocturia 3 times a night, polyphagia, and blurry vision. She has chronic numbness and tingling of the feet unchanged. She denies retinopathy, heart attack, stroke and claudication but does admit to neuropathy and nephropathy. She denies any hypoglycemia.   She has a Dexcom CGMS system and reports that even at home blood sugars were 250s in the morning and 400 later in the day as she was not eating well and eating a lot more carbohydrates throughout the day as she was hungry but was also on prednisone which does tend to raise blood sugars by the late afternoon and into the evening. Blood sugars still running in the 300-400s, Lantus insulin being increased from 33 units to 50 units tonight and Humalog insulin still at 20 units with each meal with sliding scale. Inpatient consult to Endocrinology  Consult performed by: Monalisa Cummins MD  Consult ordered by: Brandon Duran PA-C          Review of Systems   Constitutional: Positive for fatigue. Negative for unexpected weight change. HENT: Negative for trouble swallowing. Eyes: Positive for visual disturbance. Has blurry vision. Respiratory: Negative for chest tightness and shortness of breath. Cardiovascular: Negative for chest pain. Gastrointestinal: Negative for abdominal pain, constipation, diarrhea and nausea. Endocrine: Positive for polydipsia, polyphagia and polyuria. Nocturia 3 times a night. Skin: Negative for wound. Neurological: Positive for numbness. Negative for weakness and headaches. Chronic numbness and tingling of the feet. Psychiatric/Behavioral: Negative for sleep disturbance. Historical Information   Past Medical History:   Diagnosis Date   • Acute blood loss anemia 6/1/2021   • Acute diverticulitis 5/2/2021   • Alcohol abuse 5/21/2020   • Anemia    • Atrial fibrillation [facility])    • Cervical radiculopathy    • CHF (congestive heart failure) (HCC)    • Chronic low back pain    • Chronic obstructive asthma ([address] [facility]) 2/20/2018   • Cigarette nicotine dependence without complication 87/50/5206    Quit 12/10/2019.     • Community acquired pneumonia 5/21/2020   • Depression with anxiety 3/9/2014   • Diabetes mellitus with hyperglycemia ([address])    • Diverticulitis 6/6/2021   • Elevated liver enzymes    • GERD (gastroesophageal reflux disease)    • Hypersomnolence 10/28/2020   • Hypokalemia 2018   • Paresthesia of upper extremity    • Plantar fasciitis    • Restless legs syndrome 2014   • Sexual dysfunction    • Sleep apnea, obstructive    • Tenosynovitis of finger    • Tinea corporis    • Tobacco use 2018    Currently smoking 3-4 cigarettes daily   • Trigger middle finger of left hand 2017   • Trigger ring finger of left hand 2017   • Weakness of upper extremity      Past Surgical History:   Procedure Laterality Date   • ABDOMINAL SURGERY     • CARPAL TUNNEL RELEASE Bilateral    •  SECTION     • CHOLECYSTECTOMY      laparoscopic   • ESOPHAGOGASTRODUODENOSCOPY N/A 12/3/2018    Procedure: ESOPHAGOGASTRODUODENOSCOPY (EGD) AND COLONOSCOPY;  Surgeon: Jalil Meyers MD;  Location: [facility];  Service: Gastroenterology   • GASTRIC BYPASS      for morbid obesity with gilda en y   • HERNIA REPAIR     • HYSTERECTOMY     • CT EXCISION GANGLION WRIST DORSAL/VOLAR PRIMARY Left 2017    Procedure: LONG FINGER GANGLION CYST EXCISION;  Surgeon: Harry Leal MD;  [facility];  Service: Orthopedics   • CT TENDON SHEATH INCISION Left 2017    Procedure: Kate Pugh RING, TRIGGER FINGER RELEASE;  Surgeon: Harry Leal MD;  Location[facility];  Service: Orthopedics   • SHOULDER SURGERY Right      Social History   Social History     Substance and Sexual Activity   Alcohol Use Not Currently   • Alcohol/week: 20.0 standard drinks of alcohol   • Types: 20 Cans of beer per week    Comment: about 6 coors light every night, stopped in      Social History     Substance and Sexual Activity   Drug Use No     Social History     Tobacco Use   Smoking Status Former   • Packs/day: 0.25   • Years: 29.00   • Total pack years: 7.25   • Types: Cigarettes   • Start date: 200   • Quit date: 12/10/2019   • Years since quitting: 3.6   Smokeless Tobacco Never     Family History:   Family History   Problem Relation Age of Onset   • Alzheimer's disease Mother    • Atrial fibrillation Mother    • Dementia Mother    • Heart disease Mother         heart problem   • Seizures Mother    • Parkinsonism Father    • Arthritis Father    • Atrial fibrillation Father    • No Known Problems Daughter    • Cri-du-chat syndrome Daughter    • Colon cancer Maternal Grandmother 80   • Diabetes type II Maternal Grandmother    • No Known Problems Brother    • No Known Problems Son    • Substance Abuse Neg Hx         mother,father   • Mental illness Neg Hx         mother,father   • Colon polyps Neg Hx        Meds/Allergies   Current Facility-Administered Medications   Medication Dose Route Frequency Provider Last Rate Last Admin   • acetaminophen (TYLENOL) tablet 650 mg  650 mg Oral Q6H PRN [place], PA-C   650 mg at 07/24/23 0024   • albuterol (PROVENTIL HFA,VENTOLIN HFA) inhaler 2 puff  2 puff Inhalation Q4H PRN [place] Pastruth PA-C       • apixaban (ELIQUIS) tablet 5 mg  5 mg Oral BID Kaycee PastMARIELA miranda-C   5 mg at 07/24/23 1714   • atorvastatin (LIPITOR) tablet 40 mg  40 mg Oral Daily Rosamaria Miller PA-C   40 mg at 07/24/23 0911   • budesonide (PULMICORT) inhalation solution 0.5 mg  0.5 mg Nebulization BID Shahida Lockhart MD       • escitalopram (LEXAPRO) tablet 20 mg  20 mg Oral Daily Rosamaria Miller PA-C   20 mg at 07/24/23 0911   • fluticasone (FLONASE) 50 mcg/act nasal spray 1 spray  1 spray Nasal BID Kaycee Burroughs PA-C   1 spray at 07/24/23 0913   • formoterol (PERFOROMIST) nebulizer solution 20 mcg  20 mcg Nebulization Q12H Rosamaria Miller PA-C   20 mcg at 07/23/23 1949   • insulin glargine (LANTUS) subcutaneous injection 50 Units 0.5 mL  50 Units Subcutaneous HS Linda Mercedes PA-C       • insulin lispro (HumaLOG) 100 units/mL subcutaneous injection 2-12 Units  2-12 Units Subcutaneous TID AC Aliyn Myers PA-C   6 Units at 07/24/23 1714   • insulin lispro (HumaLOG) 100 units/mL subcutaneous injection 2-12 Units  2-12 Units Subcutaneous HS Ailyn Myers PA-C       • insulin lispro (HumaLOG) 100 units/mL subcutaneous injection 20 Units  20 Units Subcutaneous TID With Meals Brandon Duran PA-C   20 Units at 07/24/23 1714   • ipratropium (ATROVENT) 0.02 % inhalation solution 0.5 mg  0.5 mg Nebulization TID Leah Kanner, PA-C   0.5 mg at 07/24/23 1326   • levalbuterol ([place]) inhalation solution 1.25 mg  1.25 mg Nebulization TID Leah Kanner, PA-C   1.25 mg at 07/24/23 1326   • loratadine (CLARITIN) tablet 10 mg  10 mg Oral Daily Rosamaria Miller PA-C   20 mg at 07/24/23 0632   • methylPREDNISolone sodium succinate (Solu-MEDROL) injection 40 mg  40 mg Intravenous Q12H [facility] Suyapa Iglesias PA-C       • metoprolol succinate (TOPROL-XL) 24 hr tablet 100 mg  100 mg Oral Daily Rosamaria Miller PA-C   100 mg at 07/24/23 4953   • montelukast (SINGULAIR) tablet 10 mg  10 mg Oral HS Rosamaria Miller PA-C   10 mg at 07/23/23 2131   • pantoprazole (PROTONIX) EC tablet 40 mg  40 mg Oral Early Morning Leah Kanner, PA-C   40 mg at 07/24/23 0545   • potassium chloride (K-DUR,KLOR-CON) CR tablet 20 mEq  20 mEq Oral BID Leah Kanner, PA-C   20 mEq at 07/24/23 1714   • torsemide (DEMADEX) tablet 60 mg  60 mg Oral BID Leah Kanner, PA-C   60 mg at 07/24/23 1714   • traZODone (DESYREL) tablet 150 mg  150 mg Oral HS Rosamaria Miller PA-C   150 mg at 07/23/23 2131     Allergies   Allergen Reactions   • Iron Dextran Swelling   • Januvia [Sitagliptin] Shortness Of Breath   • Jardiance [Empagliflozin] Shortness Of Breath   • Clonazepam Other (See Comments)     Unknown reaction   • Codeine Itching and Other (See Comments)     itch;mary kay morphine,takes ultram @home   • Adhesive [Medical Tape] Itching     itching   • Effexor [Venlafaxine] Itching   • Lactose - Food Allergy GI Intolerance   • Oxycodone-Acetaminophen Anxiety   • Oxycodone-Acetaminophen Itching and Rash     Other reaction(s):  Other (See Comments)  (PERCOCET) MILD RASH, not allergic to Acetaminophen        Objective   Vitals: Blood pressure 125/74, pulse 88, temperature (!) 97.2 °F (36.2 °C), resp. rate 16, height 5' 3" (1.6 m), weight 129 kg (284 lb), SpO2 94 %, not currently breastfeeding. Intake/Output Summary (Last 24 hours) at 7/24/2023 1808  Last data filed at 7/24/2023 1701  Gross per 24 hour   Intake 720 ml   Output 1800 ml   Net -1080 ml     Invasive Devices     Peripheral Intravenous Line  Duration           Peripheral IV 07/22/23 Right Antecubital 1 day                Physical Exam  Vitals reviewed. Constitutional:       Appearance: Normal appearance. She is well-developed. She is obese. HENT:      Head: Normocephalic and atraumatic. Eyes:      Conjunctiva/sclera: Conjunctivae normal.   Neck:      Thyroid: No thyromegaly. Vascular: No carotid bruit. Comments: Thyroid normal in size. Cardiovascular:      Rate and Rhythm: Normal rate and regular rhythm. Heart sounds: Normal heart sounds. No murmur heard. Comments: 1+ dorsalis pedis pulses bilaterally. Pulmonary:      Effort: Pulmonary effort is normal.      Breath sounds: Normal breath sounds. No wheezing. Abdominal:      General: Bowel sounds are normal.      Palpations: Abdomen is soft. Tenderness: There is no abdominal tenderness. Musculoskeletal:         General: No deformity. Cervical back: Normal range of motion and neck supple. Right lower leg: Edema present. Left lower leg: Edema present. Comments: Trace to 1+ bilateral lower extremity edema. Lymphadenopathy:      Cervical: No cervical adenopathy. Skin:     General: Skin is warm and dry. Findings: No rash. Neurological:      Mental Status: She is alert and oriented to person, place, and time. Deep Tendon Reflexes: Reflexes are normal and symmetric. Comments: Light touch sensation grossly intact to the plantar surface of the feet. The history was obtained from the review of the chart, patient.     Lab Results:       Lab Results   Component Value Date    WBC 14.32 (H) 07/24/2023    HGB 8.5 (L) 07/24/2023    HCT 29.9 (L) 07/24/2023    MCV 67 (L) 07/24/2023     07/24/2023     Lab Results   Component Value Date/Time    BUN 17 07/24/2023 05:45 AM    BUN 15 05/11/2023 10:05 AM     09/22/2017 09:02 AM    K 3.8 07/24/2023 05:45 AM    K 4.5 05/11/2023 10:05 AM    CL 91 (L) 07/24/2023 05:45 AM    CL 91 (L) 05/11/2023 10:05 AM    CO2 40 (H) 07/24/2023 05:45 AM    CO2 38 (H) 07/22/2023 09:08 PM    CO2 31 (H) 05/11/2023 10:05 AM    CREATININE 0.61 07/24/2023 05:45 AM    CREATININE 0.54 (L) 09/22/2017 09:02 AM    AST 10 (L) 07/24/2023 05:45 AM    AST 20 05/11/2023 10:05 AM    ALT 34 07/24/2023 05:45 AM    ALT 21 05/11/2023 10:05 AM    TP 6.3 (L) 07/24/2023 05:45 AM    TP 6.4 05/11/2023 10:05 AM    ALB 3.8 07/24/2023 05:45 AM    ALB 4.2 09/22/2017 09:02 AM    GLOB 2.1 05/11/2023 10:05 AM     No results for input(s): "CHOL", "HDL", "LDL", "TRIG", "VLDL" in the last 72 hours. No results found for: "Almer Vassar", "[id number]"  POC Glucose (mg/dl)   Date Value   07/24/2023 256 (H)   07/24/2023 359 (H)   07/24/2023 373 (H)   07/23/2023 437 (H)   07/23/2023 468 (H)   07/23/2023 >500 (HH)   07/23/2023 >500 (HH)   07/23/2023 410 (H)   07/23/2023 366 (H)   07/23/2023 378 (H)       Imaging Studies: I have personally reviewed pertinent reports. Portions of the record may have been created with voice recognition software.

## 2023-07-24 NOTE — CASE MANAGEMENT
Case Management Assessment & Discharge Planning Note    Patient name Mounika Reese  Location [address] [facility] 222/-04 MRN [id number]  : 1960 Date 2023       Current Admission Date: 2023  Current Admission Diagnosis:Asthma with COPD with exacerbation [facility])   Patient Active Problem List    Diagnosis Date Noted   • Eosinophilic asthma    • COPD, severe ([address]) 2023   • Hepatic steatosis 2023   • Morbid obesity ([address]) 02/15/2023   • Diabetic polyneuropathy associated with type 2 diabetes mellitus ([address]) 11/15/2021   • Type 2 diabetes mellitus with hyperglycemia ([address]) 2021   • Tubular adenoma of colon 2021   • Lower gastrointestinal bleeding 2021   • Transaminitis 2021   • Gastric polyp 2021   • Class 3 severe obesity in adult [facility]) 2021   • Pulmonary hypertension ([address]) 2020   • Asthma with COPD with exacerbation ([address]) 2020   • Chronic hypercapnic respiratory failure ([address]) 2020   • Insomnia 10/28/2020   • Abnormal CT of the chest 2020   • Asthma-COPD overlap syndrome ([address]) 2020   • Lactic acidosis 2020   • Chronic diastolic congestive heart failure ([address]) 2020   • Microcytic anemia 2020   • Neck pain, chronic 2019   • Current moderate episode of major depressive disorder without prior episode ([address]) 2019   • Paroxysmal atrial fibrillation ([address]) 2018   • Severe persistent asthma 2018   • Acute on chronic respiratory failure with hypoxia ([address]) 2018   • Abnormal computed tomography angiography (CTA) of abdomen 2018   • Abnormal CT of the abdomen 2018   • Iron deficiency anemia due to chronic blood loss 2018   • Type 2 diabetes mellitus with stage 2 chronic kidney disease, with long-term current use of insulin ([address])    • Severe persistent asthma with exacerbation 2018   • Ganglion cyst of flexor tendon sheath 2017   • Paresthesia of upper extremity 09/20/2017   • Cervical radiculopathy 09/13/2017   • Elevated liver enzymes 12/30/2015   • Sexual dysfunction 11/11/2014   • Chronic low back pain 10/15/2014   • Hypercholesterolemia 09/09/2014   • Lumbar radiculopathy 09/09/2014   • Esophageal reflux 06/23/2014   • Hypertensive heart and chronic kidney disease with heart failure and stage 1 through stage 4 chronic kidney disease, or chronic kidney disease ([address] [facility]) 03/24/2014   • Obstructive sleep apnea 03/09/2014      LOS (days): 2  Geometric Mean LOS (GMLOS) (days):   Days to GMLOS:     OBJECTIVE:    Risk of Unplanned Readmission Score: 19.06         Current admission status: Inpatient  Referral Reason: Other (routine)    Preferred Pharmacy:   [address] [place] [address]  Phone: [phone] Fax: [phone]    Primary Care Provider: Estuardo Christopher MD    Primary Insurance: [facility]  Secondary Insurance:     ASSESSMENT:  [address], [address] Landon Santos Representative - Daughter   Primary Phone: [phone] (Mobile)               Advance Directives  Does patient have a [address]?: No  Was patient offered paperwork?: Yes (declined)  Does patient currently have a Health Care decision maker?: Yes, please see Health Care Proxy section  Does patient have Advance Directives?: No  Was patient offered paperwork?: Yes (declined)  Primary Contact: Cierra Artis         Readmission Root Cause  30 Day Readmission: No    Patient Information  Admitted from[de-identified] Home  Mental Status: Alert  During Assessment patient was accompanied by: Not accompanied during assessment  Assessment information provided by[de-identified] Patient  Primary Caregiver: Self  Support Systems: Family members  [place] of Residence: [address] do you live in?: [address] entry access options.  Select all that apply.: No steps to enter home  Type of Current Residence: [facility]  In the last 12 months, was there a time when you were not able to pay the mortgage or rent on time?: No  In the last 12 months, how many places have you lived?: 1  In the last 12 months, was there a time when you did not have a steady place to sleep or slept in a shelter (including now)?: No  Homeless/housing insecurity resource given?: N/A  Living Arrangements: Lives Alone  Is patient a ?: No    Activities of Daily Living Prior to Admission  Functional Status: Independent  Completes ADLs independently?: Yes  Ambulates independently?: Yes  Does patient use assisted devices?: Yes  Assisted Devices (DME) used: Home Oxygen concentrator, CPAP, [address] Name (respiratory supplies): [employer]  O2 Rate(s): 4L  Does patient currently own DME?: Yes  What DME does the patient currently own?: CPAP, Home Oxygen concentrator, Nebulizer  Does patient have a history of Outpatient Therapy (PT/OT)?: Yes  Does the patient have a history of Short-Term Rehab?: No  Does patient have a history of [facility]?: Yes  Does patient currently have [facility]?: No         Patient Information Continued  Income Source: Employed  Does patient have prescription coverage?: Yes  Within the past 12 months, you worried that your food would run out before you got the money to buy more.: Never true  Within the past 12 months, the food you bought just didn't last and you didn't have money to get more.: Never true  Food insecurity resource given?: N/A  Does patient receive dialysis treatments?: No  Does patient have a history of substance abuse?: No  Does patient have a history of Mental Health Diagnosis?: No         Means of Transportation  Means of Transport to Appts[de-identified] Drives Self  In the past 12 months, has lack of transportation kept you from medical appointments or from getting medications?: No  In the past 12 months, has lack of transportation kept you from meetings, work, or from getting things needed for daily living?: No  Was application for public transport provided?: N/A        DISCHARGE DETAILS:    Discharge planning discussed with[de-identified] patient in ED  [place] of Choice: Yes  Comments - Freedom of Choice: disucssed dc planning and role of CM  CM contacted family/caregiver?: No- see comments (pt alert and indpt in room)  Were Treatment Team discharge recommendations reviewed with patient/caregiver?: Yes  Did patient/caregiver verbalize understanding of patient care needs?: Yes       Contacts  Reason/Outcome: Discharge [address]         Is the patient interested in [facility] at discharge?: No (pending needs)    DME Referral Provided  Referral made for DME?: No    Other Referral/Resources/Interventions Provided:  Interventions: None Indicated  Referral Comments: Pt needs TBD . Indpt at baseline. Pending endo consult            Discharge Destination Plan[de-identified] Home  Transport at Discharge : Family                                      Additional Comments: Cm met with pt in the ED. Pt reports that she lives alone in a [place] style home. Pt has Home O2 set up form [employer] 4L oz at baseline. Pt also has Cpap and nebulizer from [employer] as well. Pt is on insulin regimine at home as well. Endocrine consult pending for changes. Pt has a hx of [facility] with [place] from many years ago. Pt has no STR hx. She has partciapted in Op Pt before. Pt has no MH/DA/ hx. Pt reports that she drives and is indpt at baseline. Pt sees  PCP and uses [facility] pharmacy. Nutrition is also consulted and she is on Eliquis PTA.

## 2023-07-24 NOTE — ASSESSMENT & PLAN NOTE
· Presents from home due to worsening SOB, initially placed on BiPAP. Able to be weaned down to 6 L nasal cannula after breathing treatment, steroids and IV Lasix. · Chronically on 4 L nasal cannula  · Recent admission from 6/2 through 6/6 with COPD exacerbation. DC on prednisone taper. Patient reports compliance and had felt better up until about 2 weeks ago. · Called pulmonology on 7/14 due to SOB. Started on Z-Carlos and prednisone taper  · Finished both of these.  Reports improvement initially but sob worsened after completion of steroids  · Home regimen: (Reportedly noncompliant with nebulizer per admission report)  · Bevespi twice daily  · Budesonide twice daily  · Benralizumab monthly injection  · Montelukast  · Xopenex twice daily  · In the ER received IV Solu-Medrol, IV azithromycin and breathing treatments  · Plan:  · Respiratory protocol  · IV Solu-Medrol decreased to 40 mg every 12 hours  · Xopenex/Atrovent 3 times daily  · Pulmicort twice daily  · Performist twice daily  · Consult pulmonology -appreciate recommendations

## 2023-07-24 NOTE — OCCUPATIONAL THERAPY NOTE
Occupational Therapy Screen    Patient Name: Shell Lindsay  BVVEW'C Date: 7/24/2023 07/24/23 1030   OT Last Visit   OT Visit Date 07/24/23   Note Type   Note type Screen   Additional Comments OT orders received and chart reviewed. Spoke to Shweta who states pt currently has no needs. Georges Dakin states she will be following [facility] and will reconsult if a need arises. Recommend pt continue to be OOB for meals, ambulation to/from BR, perform self care tasks, and mobility in hallway with nursing. At this time, OT recommendations at time of discharge are return home at [facility]. No acute OT needs identified at this time; please re-consult if OT needs arise during remainder of hospital stay.      [place] Stamp, MS, OTR/L

## 2023-07-24 NOTE — UTILIZATION REVIEW
NOTIFICATION OF INPATIENT ADMISSION   AUTHORIZATION REQUEST   SERVICING FACILITY:   [address] [place] [address]  [address] [facility] [address]  Tax ID: [id number]  NPI: [id number] ATTENDING PROVIDER:  Attending Name and NPI#: Christian Fishman Md [[id number]]  Address: [address] [facility] [address]  Phone: [phone]   ADMISSION INFORMATION:  [facility]patient [address] [facility]   Place of Service Code: 21  Inpatient Admission Date/Time: 7/22/23 11:40 PM  Discharge Date/Time: No discharge date for patient encounter. Admitting Diagnosis Code/Description:  CHF (congestive heart failure) ([facility]) [I50.9]  SOB (shortness of breath) [R06.02]  Acute exacerbation of chronic obstructive pulmonary disease (COPD) ([address]) [J44.1]  Hypoxia [R09.02]  Type 2 diabetes mellitus with hyperglycemia, with long-term current use of insulin ([address]) [E11.65, Z79.4]     UTILIZATION REVIEW CONTACT:  Trudy Mistry Utilization   Network Utilization Review Department  Phone: [phone]  Fax [phone]  Email: [email or web address]  Contact for approvals/pending authorizations, clinical reviews, and discharge. PHYSICIAN ADVISORY SERVICES:  Medical Necessity Denial & Udfz-lm-Ibkr Review  Phone: [phone]  Fax: [phone]  Email: [email or web address]

## 2023-07-24 NOTE — PLAN OF CARE
Problem: MOBILITY - ADULT  Goal: Maintain or return to baseline ADL function  Description: INTERVENTIONS:  -  Assess patient's ability to carry out ADLs; assess patient's baseline for ADL function and identify physical deficits which impact ability to perform ADLs (bathing, care of mouth/teeth, toileting, grooming, dressing, etc.)  - Assess/evaluate cause of self-care deficits   - Assess range of motion  - Assess patient's mobility; develop plan if impaired  - Assess patient's need for assistive devices and provide as appropriate  - Encourage maximum independence but intervene and supervise when necessary  - Involve family in performance of ADLs  - Assess for home care needs following discharge   - Consider OT consult to assist with ADL evaluation and planning for discharge  - Provide patient education as appropriate  Outcome: Progressing  Goal: Maintains/Returns to pre admission functional level  Description: INTERVENTIONS:  - Perform BMAT or MOVE assessment daily.   - Set and communicate daily mobility goal to care team and patient/family/caregiver. - Collaborate with rehabilitation services on mobility goals if consulted  - Perform Range of Motion 3 times a day. - Reposition patient every 2 hours.   - Dangle patient 3 times a day  - Stand patient 3 times a day  - Ambulate patient 3 times a day  - Out of bed to chair 3 times a day   - Out of bed for meals 3 times a day  - Out of bed for toileting  - Record patient progress and toleration of activity level   Outcome: Progressing     Problem: PAIN - ADULT  Goal: Verbalizes/displays adequate comfort level or baseline comfort level  Description: Interventions:  - Encourage patient to monitor pain and request assistance  - Assess pain using appropriate pain scale  - Administer analgesics based on type and severity of pain and evaluate response  - Implement non-pharmacological measures as appropriate and evaluate response  - Consider cultural and social influences on pain and pain management  - Notify physician/advanced practitioner if interventions unsuccessful or patient reports new pain  Outcome: Progressing     Problem: INFECTION - ADULT  Goal: Absence or prevention of progression during hospitalization  Description: INTERVENTIONS:  - Assess and monitor for signs and symptoms of infection  - Monitor lab/diagnostic results  - Monitor all insertion sites, i.e. indwelling lines, tubes, and drains  - Monitor endotracheal if appropriate and nasal secretions for changes in amount and color  - [place] appropriate cooling/warming therapies per order  - Administer medications as ordered  - Instruct and encourage patient and family to use good hand hygiene technique  - Identify and instruct in appropriate isolation precautions for identified infection/condition  Outcome: Progressing  Goal: Absence of fever/infection during neutropenic period  Description: INTERVENTIONS:  - Monitor WBC    Outcome: Progressing     Problem: SAFETY ADULT  Goal: Maintain or return to baseline ADL function  Description: INTERVENTIONS:  -  Assess patient's ability to carry out ADLs; assess patient's baseline for ADL function and identify physical deficits which impact ability to perform ADLs (bathing, care of mouth/teeth, toileting, grooming, dressing, etc.)  - Assess/evaluate cause of self-care deficits   - Assess range of motion  - Assess patient's mobility; develop plan if impaired  - Assess patient's need for assistive devices and provide as appropriate  - Encourage maximum independence but intervene and supervise when necessary  - Involve family in performance of ADLs  - Assess for home care needs following discharge   - Consider OT consult to assist with ADL evaluation and planning for discharge  - Provide patient education as appropriate  Outcome: Progressing  Goal: Maintains/Returns to pre admission functional level  Description: INTERVENTIONS:  - Perform BMAT or MOVE assessment daily.   - Set and communicate daily mobility goal to care team and patient/family/caregiver. - Collaborate with rehabilitation services on mobility goals if consulted  - Perform Range of Motion 3 times a day. - Reposition patient every 2 hours.   - Dangle patient 3 times a day  - Stand patient 3 times a day  - Ambulate patient 3 times a day  - Out of bed to chair 3 times a day   - Out of bed for meals 3 times a day  - Out of bed for toileting  - Record patient progress and toleration of activity level   Outcome: Progressing  Goal: Patient will remain free of falls  Description: INTERVENTIONS:  - Educate patient/family on patient safety including physical limitations  - Instruct patient to call for assistance with activity   - Consult OT/PT to assist with strengthening/mobility   - Keep Call bell within reach  - Keep bed low and locked with side rails adjusted as appropriate  - Keep care items and personal belongings within reach  - Initiate and maintain comfort rounds  - Make Fall Risk Sign visible to staff  - Offer Toileting every 2 Hours, in advance of need  - Initiate/Maintain bed/ chair alarm  - Obtain necessary fall risk management equipment: n/a  - Apply yellow socks and bracelet for high fall risk patients  - Consider moving patient to room near nurses station  Outcome: Progressing     Problem: DISCHARGE PLANNING  Goal: Discharge to home or other facility with appropriate resources  Description: INTERVENTIONS:  - Identify barriers to discharge w/patient and caregiver  - Arrange for needed discharge resources and transportation as appropriate  - Identify discharge learning needs (meds, wound care, etc.)  - Arrange for interpretive services to assist at discharge as needed  - Refer to Case Management Department for coordinating discharge planning if the patient needs post-hospital services based on physician/advanced practitioner order or complex needs related to functional status, cognitive ability, or social support system  Outcome: Progressing     Problem: Knowledge Deficit  Goal: Patient/family/caregiver demonstrates understanding of disease process, treatment plan, medications, and discharge instructions  Description: Complete learning assessment and assess knowledge base.   Interventions:  - Provide teaching at level of understanding  - Provide teaching via preferred learning methods  Outcome: Progressing     Problem: RESPIRATORY - ADULT  Goal: Achieves optimal ventilation and oxygenation  Description: INTERVENTIONS:  - Assess for changes in respiratory status  - Assess for changes in mentation and behavior  - Position to facilitate oxygenation and minimize respiratory effort  - Oxygen administered by appropriate delivery if ordered  - Initiate smoking cessation education as indicated  - Encourage broncho-pulmonary hygiene including cough, deep breathe, Incentive Spirometry  - Assess the need for suctioning and aspirate as needed  - Assess and instruct to report SOB or any respiratory difficulty  - Respiratory Therapy support as indicated  Outcome: Progressing

## 2023-07-24 NOTE — ASSESSMENT & PLAN NOTE
· Chronically on 4 L nasal cannula  · Initially required BiPAP in the ER  · COPD exacerbation being treated with IV steroids and nebulizer treatments  · Respiratory protocol  · Wean oxygen to baseline when able -patient now back to baseline requirement of 4 L

## 2023-07-24 NOTE — PROGRESS NOTES
Outpatient Care Management Note:    ADT alert received that patient is hospitalized for acute exacerbation of COPD. CM will attempt to outreach on discharge.

## 2023-07-24 NOTE — PROGRESS NOTES
[facility]  Progress Note  Name: Rivera Gardiner  MRN: [id number]  Unit/Bed#: ED 11 I Date of Admission: 7/22/2023   Date of Service: 7/24/2023 I Hospital Day: 2    Assessment/Plan   * Asthma with COPD with exacerbation [facility])  Assessment & Plan  · Presents from home due to worsening SOB, initially placed on BiPAP. Able to be weaned down to 6 L nasal cannula after breathing treatment, steroids and IV Lasix. · Chronically on 4 L nasal cannula  · Recent admission from 6/2 through 6/6 with COPD exacerbation. DC on prednisone taper. Patient reports compliance and had felt better up until about 2 weeks ago. · Called pulmonology on 7/14 due to SOB. Started on Z-Carlos and prednisone taper  · Finished both of these. Reports improvement initially but sob worsened after completion of steroids  · Home regimen: (Reportedly noncompliant with nebulizer per admission report)  · Bevespi twice daily  · Budesonide twice daily  · Benralizumab monthly injection  · Montelukast  · Xopenex twice daily  · In the ER received IV Solu-Medrol, IV azithromycin and breathing treatments  · Plan:  · Respiratory protocol  · IV Solu-Medrol decreased to 40 mg every 12 hours  · Xopenex/Atrovent 3 times daily  · Pulmicort twice daily  · Performist twice daily  · Consult pulmonology -appreciate recommendations    Acute on chronic respiratory failure with hypoxia (HCC)  Assessment & Plan  · Chronically on 4 L nasal cannula  · Initially required BiPAP in the ER  · COPD exacerbation being treated with IV steroids and nebulizer treatments  · Respiratory protocol  · Wean oxygen to baseline when able -patient now back to baseline requirement of 4 L    Class 3 severe obesity in adult [facility])  Assessment & Plan  Body mass index is 50.31 kg/m².   · Encourage lifestyle changes   · Consult nutrition     Chronic diastolic congestive heart failure (HCC)  Assessment & Plan  Wt Readings from Last 3 Encounters:   07/22/23 129 kg (284 lb) 06/08/23 129 kg (284 lb)   06/06/23 129 kg (284 lb 9.6 oz)     · Does not appear fluid overloaded on exam. No lung crackles noted. · BNP slightly elevated a 112  · Received IV Lasix 40 mg in the ER. Hold further IV doses at this time  · Continue home torsemide  · Monitor I/O and daily weights    Paroxysmal atrial fibrillation (HCC)  Assessment & Plan  · Home regimen: Eliquis 5 mg twice daily, metoprolol succinate 100 mg daily    Type 2 diabetes mellitus with stage 2 chronic kidney disease, with long-term current use of insulin [facility])  Assessment & Plan  Lab Results   Component Value Date    HGBA1C 9.9 (H) 05/11/2023       Recent Labs     07/23/23 2025 07/23/23  2101 07/24/23  0744 07/24/23  1124   POCGLU 468* 437* 373* 359*       Blood Sugar Average: Last 72 hrs:  · (P) [id number]   · Home regimen: Glargine 30 units daily at bedtime, glulisine 24 units with breakfast, 12 units with lunch and dinner  · Significant steroid-induced hyperglycemia. Increase Lantus to 50 units daily at bedtime, humalog 20 units TID with meals, SSI + accuchek  · Diabetic diet  · Consult endocrinology    Obstructive sleep apnea  Assessment & Plan  · CPAP with AutoPap 12-20 at bedtime        VTE Pharmacologic Prophylaxis: VTE Score: 5 High Risk (Score >/= 5) - Pharmacological DVT Prophylaxis Ordered: apixaban (Eliquis). Sequential Compression Devices Ordered. Patient Centered Rounds: I performed bedside rounds with nursing staff today. Discussions with Specialists or Other Care Team Provider: POLO, appreciate pulm input    Education and Discussions with Family / Patient: Patient declined call to . Offered to call.     Total Time Spent on Date of Encounter in care of patient: 45 minutes This time was spent on one or more of the following: performing physical exam; counseling and coordination of care; obtaining or reviewing history; documenting in the medical record; reviewing/ordering tests, medications or procedures; communicating with other healthcare professionals and discussing with patient's family/caregivers. Current Length of Stay: 2 day(s)  Current Patient Status: Inpatient   Certification Statement: The patient will continue to require additional inpatient hospital stay due to COPD exacerbation, hyperglycemia  Discharge Plan: Anticipate discharge in 48 hrs to home. Code Status: Level 1 - Full Code    Subjective:   Patient feels improved since admission. Denies chest pain/palpitations. Still with some shortness of breath and productive cough. Denies nausea/vomiting, abdominal pain. Feels her lower extremity swelling has improved. Objective:     Vitals:   No data recorded. HR:  [70-89] 83  Resp:  [18] 18  BP: (120-139)/(61-63) 120/63  SpO2:  [92 %-98 %] 94 %  Body mass index is 50.31 kg/m². Input and Output Summary (last 24 hours): Intake/Output Summary (Last 24 hours) at 7/24/2023 1144  Last data filed at 7/24/2023 0024  Gross per 24 hour   Intake 480 ml   Output 3850 ml   Net -3370 ml       Physical Exam:   Physical Exam  Vitals and nursing note reviewed. Constitutional:       Appearance: Normal appearance. Interventions: Nasal cannula in place. Comments: No acute distress   HENT:      Head: Normocephalic. Eyes:      General: No scleral icterus. Extraocular Movements: Extraocular movements intact. Conjunctiva/sclera: Conjunctivae normal.   Cardiovascular:      Rate and Rhythm: Normal rate and regular rhythm. Heart sounds: S1 normal and S2 normal.   Pulmonary:      Effort: Pulmonary effort is normal.      Breath sounds: Decreased breath sounds present. No wheezing, rhonchi or rales. Abdominal:      General: Bowel sounds are normal.      Palpations: Abdomen is soft. Tenderness: There is no abdominal tenderness. There is no guarding or rebound. Musculoskeletal:         General: No swelling, tenderness or deformity.       Cervical back: Normal range of motion. Comments: Able to move upper/lower extremities bilaterally, trace pitting lower extremity edema   Skin:     General: Skin is warm and dry. Neurological:      Mental Status: She is alert and oriented to person, place, and time.    Psychiatric:         Mood and Affect: Mood normal.         Speech: Speech normal.         Behavior: Behavior normal.          Additional Data:     Labs:  Results from last 7 days   Lab Units 07/24/23  0545   WBC Thousand/uL 14.32*   HEMOGLOBIN g/dL 8.5*   HEMATOCRIT % 29.9*   PLATELETS Thousands/uL 300   NEUTROS PCT % 91*   LYMPHS PCT % 5*   MONOS PCT % 3*   EOS PCT % 0     Results from last 7 days   Lab Units 07/24/23  0545   SODIUM mmol/L 137   POTASSIUM mmol/L 3.8   CHLORIDE mmol/L 91*   CO2 mmol/L 40*   BUN mg/dL 17   CREATININE mg/dL 0.61   ANION GAP mmol/L 6   CALCIUM mg/dL 8.6   ALBUMIN g/dL 3.8   TOTAL BILIRUBIN mg/dL 0.48   ALK PHOS U/L 119*   ALT U/L 34   AST U/L 10*   GLUCOSE RANDOM mg/dL 335*         Results from last 7 days   Lab Units 07/24/23  1124 07/24/23  0744 07/23/23  2101 07/23/23  2025 07/23/23  1919 07/23/23  1735 07/23/23  1231 07/23/23  0858 07/23/23  0108   POC GLUCOSE mg/dl 359* 373* 437* 468* >500* >500* 410* 366* 378*         Results from last 7 days   Lab Units 07/23/23  0532   PROCALCITONIN ng/ml 0.06       Lines/Drains:  Invasive Devices     Peripheral Intravenous Line  Duration           Peripheral IV 07/22/23 Right Antecubital 1 day                      Imaging: Reviewed radiology reports from this admission including: chest xray    Recent Cultures (last 7 days):         Last 24 Hours Medication List:   Current Facility-Administered Medications   Medication Dose Route Frequency Provider Last Rate   • acetaminophen  650 mg Oral Q6H PRN Isabel Ruano PA-C     • albuterol  2 puff Inhalation Q4H PRN Isabel Rauno PA-C     • apixaban  5 mg Oral BID Isabel Ruano PA-C     • atorvastatin  40 mg Oral Daily Rosamaria Miller PA-C     • budesonide  0.5 mg Nebulization BID Silvana Morrow PA-C     • escitalopram  20 mg Oral Daily Silvana Morrow PA-C     • fluticasone  1 spray Nasal BID Silvana Morrow PA-C     • formoterol  20 mcg Nebulization Q12H Silvana Morrow PA-C     • insulin glargine  50 Units Subcutaneous HS Azucena Schilder, PA-C     • insulin lispro  2-12 Units Subcutaneous TID AC Azucena Schilder, PA-C     • insulin lispro  2-12 Units Subcutaneous HS Azucena Schilder, PA-C     • insulin lispro  20 Units Subcutaneous TID With Meals Azucena Schilder, PA-C     • ipratropium  0.5 mg Nebulization TID Silvana Morrow PA-C     • levalbuterol  1.25 mg Nebulization TID Silvana Morrow PA-C     • loratadine  10 mg Oral Daily Silvana Morrow PA-C     • methylPREDNISolone sodium succinate  40 mg Intravenous Q12H [address] [facility] Ashanti Iglesias PA-C     • metoprolol succinate  100 mg Oral Daily Silvana Morrow PA-C     • montelukast  10 mg Oral HS Rosamaria Miller PA-C     • pantoprazole  40 mg Oral Early Morning Silvana Morrow PA-C     • potassium chloride  20 mEq Oral BID Silvana Morrow PA-C     • torsemide  60 mg Oral BID Silvana Morrow PA-C     • traZODone  150 mg Oral HS Silvana Morrow PA-C          Today, Patient Was Seen By: Azucena Schilder, PA-C    **Please Note: This note may have been constructed using a voice recognition system. **

## 2023-07-24 NOTE — PROGRESS NOTES
Progress Note - Pulmonary   Mattie Cheng Dy 57 y.o. female MRN: [id number]  Unit/Bed#: ED 11 Encounter: [id number]    Assessment & Plan:  Acute on chronic hypoxic and hypercapnic respiratory failure  Asthma/COPD overlap with AE  CHF  WILMA  Morbid obesity    · Maintain pulse ox > 88%. Currently saturating well on baseline 4 L   · BiPAP trial last evening. Patient compliant with home CPAP. Would f/u with outpatient BiPAP study with transcutaneous capnography monitoring as planned by Dr. Elzbieta Shah  · Continue nebulizer treatments  · Zhang Leak needs to be resumed as outpatient - will check with the office. Message sent to coordinator  · IV Solu-Medrol 40 mg - will decrease to Q 12 H today  · Received 1 dose of IV lasix 60 mg yesterday in addition to torsemide    Subjective:   Patient says her breathing is a little better today. Had good urine output after receiving Lasix. Reports she was coughing up some clear sputum this AM. No new complaints. Objective:     Vitals: Blood pressure 139/63, pulse 70, temperature 98.5 °F (36.9 °C), temperature source Oral, resp. rate 22, height 5' 3" (1.6 m), weight 129 kg (284 lb), SpO2 94 %, not currently breastfeeding. ,Body mass index is 50.31 kg/m². Intake/Output Summary (Last 24 hours) at 7/24/2023 1112  Last data filed at 7/24/2023 0024  Gross per 24 hour   Intake 480 ml   Output 3850 ml   Net -3370 ml       Invasive Devices     Peripheral Intravenous Line  Duration           Peripheral IV 07/22/23 Right Antecubital 1 day                Physical Exam:   General appearance: Alert and oriented, in no acute distress  Head: Normocephalic, without obvious abnormality, atraumatic  Eyes: EOMI. No discharge bilaterally. No scleral icterus. Neck: Supple, symmetrical, trachea midline  Lungs: Decreased breath sounds.  +Cough w deep inspiration  Heart: Regular rate and rhythm, S1, S2 normal, no murmur  Abdomen:  No appreciable distension or tenderness  Extremities: Nontender  Skin: Warm and dry  Neurologic: No acute focal deficits are noted      Labs: I have personally reviewed pertinent lab results. Imaging and other studies: I have personally reviewed pertinent reports.

## 2023-07-25 LAB
ANION GAP SERPL CALCULATED.3IONS-SCNC: 8 MMOL/L
ANISOCYTOSIS BLD QL SMEAR: PRESENT
BASE EX.OXY STD BLDV CALC-SCNC: 80 % (ref 60–80)
BASE EXCESS BLDV CALC-SCNC: 5.9 MMOL/L
BASOPHILS # BLD AUTO: 0.03 THOUSANDS/ÂΜL (ref 0–0.1)
BASOPHILS NFR BLD AUTO: 0 % (ref 0–1)
BUN SERPL-MCNC: 18 MG/DL (ref 5–25)
CALCIUM SERPL-MCNC: 8.5 MG/DL (ref 8.4–10.2)
CHLORIDE SERPL-SCNC: 91 MMOL/L (ref 96–108)
CO2 SERPL-SCNC: 39 MMOL/L (ref 21–32)
CREAT SERPL-MCNC: 0.67 MG/DL (ref 0.6–1.3)
EOSINOPHIL # BLD AUTO: 0.01 THOUSAND/ÂΜL (ref 0–0.61)
EOSINOPHIL NFR BLD AUTO: 0 % (ref 0–6)
ERYTHROCYTE [DISTWIDTH] IN BLOOD BY AUTOMATED COUNT: 19.6 % (ref 11.6–15.1)
GFR SERPL CREATININE-BSD FRML MDRD: 93 ML/MIN/1.73SQ M
GLUCOSE SERPL-MCNC: 252 MG/DL (ref 65–140)
GLUCOSE SERPL-MCNC: 268 MG/DL (ref 65–140)
GLUCOSE SERPL-MCNC: 276 MG/DL (ref 65–140)
GLUCOSE SERPL-MCNC: 287 MG/DL (ref 65–140)
GLUCOSE SERPL-MCNC: 319 MG/DL (ref 65–140)
HCO3 BLDV-SCNC: 33.8 MMOL/L (ref 24–30)
HCT VFR BLD AUTO: 34.2 % (ref 34.8–46.1)
HGB BLD-MCNC: 9.6 G/DL (ref 11.5–15.4)
IMM GRANULOCYTES # BLD AUTO: 0.1 THOUSAND/UL (ref 0–0.2)
IMM GRANULOCYTES NFR BLD AUTO: 1 % (ref 0–2)
LYMPHOCYTES # BLD AUTO: 0.91 THOUSANDS/ÂΜL (ref 0.6–4.47)
LYMPHOCYTES NFR BLD AUTO: 5 % (ref 14–44)
MCH RBC QN AUTO: 19.3 PG (ref 26.8–34.3)
MCHC RBC AUTO-ENTMCNC: 28.1 G/DL (ref 31.4–37.4)
MCV RBC AUTO: 69 FL (ref 82–98)
MONOCYTES # BLD AUTO: 0.43 THOUSAND/ÂΜL (ref 0.17–1.22)
MONOCYTES NFR BLD AUTO: 2 % (ref 4–12)
NEUTROPHILS # BLD AUTO: 17.16 THOUSANDS/ÂΜL (ref 1.85–7.62)
NEUTS SEG NFR BLD AUTO: 92 % (ref 43–75)
NRBC BLD AUTO-RTO: 0 /100 WBCS
O2 CT BLDV-SCNC: 12 ML/DL
PCO2 BLDV: 69.4 MM HG (ref 42–50)
PH BLDV: 7.31 [PH] (ref 7.3–7.4)
PLATELET # BLD AUTO: 307 THOUSANDS/UL (ref 149–390)
PLATELET BLD QL SMEAR: ADEQUATE
PMV BLD AUTO: 10.5 FL (ref 8.9–12.7)
PO2 BLDV: 54.8 MM HG (ref 35–45)
POLYCHROMASIA BLD QL SMEAR: PRESENT
POTASSIUM SERPL-SCNC: 3.9 MMOL/L (ref 3.5–5.3)
RBC # BLD AUTO: 4.98 MILLION/UL (ref 3.81–5.12)
SODIUM SERPL-SCNC: 138 MMOL/L (ref 135–147)
WBC # BLD AUTO: 18.64 THOUSAND/UL (ref 4.31–10.16)

## 2023-07-25 PROCEDURE — 80048 BASIC METABOLIC PNL TOTAL CA: CPT | Performed by: FAMILY MEDICINE

## 2023-07-25 PROCEDURE — 82948 REAGENT STRIP/BLOOD GLUCOSE: CPT

## 2023-07-25 PROCEDURE — 82805 BLOOD GASES W/O2 SATURATION: CPT | Performed by: FAMILY MEDICINE

## 2023-07-25 PROCEDURE — 94660 CPAP INITIATION&MGMT: CPT

## 2023-07-25 PROCEDURE — 94640 AIRWAY INHALATION TREATMENT: CPT

## 2023-07-25 PROCEDURE — 85025 COMPLETE CBC W/AUTO DIFF WBC: CPT | Performed by: FAMILY MEDICINE

## 2023-07-25 PROCEDURE — 94760 N-INVAS EAR/PLS OXIMETRY 1: CPT

## 2023-07-25 PROCEDURE — 99232 SBSQ HOSP IP/OBS MODERATE 35: CPT | Performed by: INTERNAL MEDICINE

## 2023-07-25 PROCEDURE — 99232 SBSQ HOSP IP/OBS MODERATE 35: CPT | Performed by: PHYSICIAN ASSISTANT

## 2023-07-25 RX ORDER — LANOLIN ALCOHOL/MO/W.PET/CERES
3 CREAM (GRAM) TOPICAL
Status: DISCONTINUED | OUTPATIENT
Start: 2023-07-25 | End: 2023-07-26 | Stop reason: HOSPADM

## 2023-07-25 RX ORDER — PREDNISONE 20 MG/1
20 TABLET ORAL DAILY
Status: DISCONTINUED | OUTPATIENT
Start: 2023-08-01 | End: 2023-07-26 | Stop reason: HOSPADM

## 2023-07-25 RX ORDER — PREDNISONE 20 MG/1
40 TABLET ORAL DAILY
Status: DISCONTINUED | OUTPATIENT
Start: 2023-07-26 | End: 2023-07-26 | Stop reason: HOSPADM

## 2023-07-25 RX ORDER — PREDNISONE 10 MG/1
10 TABLET ORAL DAILY
Status: DISCONTINUED | OUTPATIENT
Start: 2023-08-04 | End: 2023-07-26 | Stop reason: HOSPADM

## 2023-07-25 RX ADMIN — PANTOPRAZOLE SODIUM 40 MG: 40 TABLET, DELAYED RELEASE ORAL at 05:47

## 2023-07-25 RX ADMIN — LEVALBUTEROL HYDROCHLORIDE 1.25 MG: 1.25 SOLUTION RESPIRATORY (INHALATION) at 14:22

## 2023-07-25 RX ADMIN — MONTELUKAST 10 MG: 10 TABLET, FILM COATED ORAL at 21:24

## 2023-07-25 RX ADMIN — TRAZODONE HYDROCHLORIDE 150 MG: 150 TABLET ORAL at 21:24

## 2023-07-25 RX ADMIN — IPRATROPIUM BROMIDE 0.5 MG: 0.5 SOLUTION RESPIRATORY (INHALATION) at 07:53

## 2023-07-25 RX ADMIN — METOPROLOL SUCCINATE 100 MG: 50 TABLET, EXTENDED RELEASE ORAL at 08:58

## 2023-07-25 RX ADMIN — POTASSIUM CHLORIDE 20 MEQ: 1500 TABLET, EXTENDED RELEASE ORAL at 08:55

## 2023-07-25 RX ADMIN — IPRATROPIUM BROMIDE 0.5 MG: 0.5 SOLUTION RESPIRATORY (INHALATION) at 20:27

## 2023-07-25 RX ADMIN — FLUTICASONE PROPIONATE 1 SPRAY: 50 SPRAY, METERED NASAL at 08:57

## 2023-07-25 RX ADMIN — INSULIN LISPRO 8 UNITS: 100 INJECTION, SOLUTION INTRAVENOUS; SUBCUTANEOUS at 07:30

## 2023-07-25 RX ADMIN — ATORVASTATIN CALCIUM 40 MG: 40 TABLET, FILM COATED ORAL at 08:55

## 2023-07-25 RX ADMIN — BUDESONIDE 0.5 MG: 0.5 INHALANT ORAL at 20:27

## 2023-07-25 RX ADMIN — BUDESONIDE 0.5 MG: 0.5 INHALANT ORAL at 07:53

## 2023-07-25 RX ADMIN — FORMOTEROL FUMARATE DIHYDRATE 20 MCG: 20 SOLUTION RESPIRATORY (INHALATION) at 20:27

## 2023-07-25 RX ADMIN — LEVALBUTEROL HYDROCHLORIDE 1.25 MG: 1.25 SOLUTION RESPIRATORY (INHALATION) at 07:53

## 2023-07-25 RX ADMIN — TORSEMIDE 60 MG: 20 TABLET ORAL at 08:58

## 2023-07-25 RX ADMIN — INSULIN LISPRO 6 UNITS: 100 INJECTION, SOLUTION INTRAVENOUS; SUBCUTANEOUS at 21:24

## 2023-07-25 RX ADMIN — INSULIN LISPRO 20 UNITS: 100 INJECTION, SOLUTION INTRAVENOUS; SUBCUTANEOUS at 17:12

## 2023-07-25 RX ADMIN — IPRATROPIUM BROMIDE 0.5 MG: 0.5 SOLUTION RESPIRATORY (INHALATION) at 14:22

## 2023-07-25 RX ADMIN — LEVALBUTEROL HYDROCHLORIDE 1.25 MG: 1.25 SOLUTION RESPIRATORY (INHALATION) at 20:27

## 2023-07-25 RX ADMIN — INSULIN LISPRO 20 UNITS: 100 INJECTION, SOLUTION INTRAVENOUS; SUBCUTANEOUS at 12:05

## 2023-07-25 RX ADMIN — INSULIN LISPRO 20 UNITS: 100 INJECTION, SOLUTION INTRAVENOUS; SUBCUTANEOUS at 07:30

## 2023-07-25 RX ADMIN — LORATADINE 10 MG: 10 TABLET ORAL at 08:55

## 2023-07-25 RX ADMIN — ESCITALOPRAM OXALATE 20 MG: 20 TABLET ORAL at 08:55

## 2023-07-25 RX ADMIN — POTASSIUM CHLORIDE 20 MEQ: 1500 TABLET, EXTENDED RELEASE ORAL at 17:11

## 2023-07-25 RX ADMIN — APIXABAN 5 MG: 5 TABLET, FILM COATED ORAL at 17:10

## 2023-07-25 RX ADMIN — MELATONIN 3 MG: at 23:39

## 2023-07-25 RX ADMIN — TORSEMIDE 60 MG: 20 TABLET ORAL at 17:10

## 2023-07-25 RX ADMIN — FLUTICASONE PROPIONATE 1 SPRAY: 50 SPRAY, METERED NASAL at 21:25

## 2023-07-25 RX ADMIN — ACETAMINOPHEN 650 MG: 325 TABLET, FILM COATED ORAL at 23:31

## 2023-07-25 RX ADMIN — APIXABAN 5 MG: 5 TABLET, FILM COATED ORAL at 08:55

## 2023-07-25 RX ADMIN — METHYLPREDNISOLONE SODIUM SUCCINATE 40 MG: 40 INJECTION, POWDER, FOR SOLUTION INTRAMUSCULAR; INTRAVENOUS at 08:56

## 2023-07-25 RX ADMIN — INSULIN LISPRO 6 UNITS: 100 INJECTION, SOLUTION INTRAVENOUS; SUBCUTANEOUS at 17:12

## 2023-07-25 RX ADMIN — INSULIN LISPRO 6 UNITS: 100 INJECTION, SOLUTION INTRAVENOUS; SUBCUTANEOUS at 12:04

## 2023-07-25 RX ADMIN — INSULIN GLARGINE 50 UNITS: 100 INJECTION, SOLUTION SUBCUTANEOUS at 21:23

## 2023-07-25 NOTE — PROGRESS NOTES
[address] [facility]  Progress Note  Name: Levi Murphy  MRN: [id number]  Unit/Bed#: -01 I Date of Admission: 7/22/2023   Date of Service: 7/25/2023 I Hospital Day: 3    Assessment/Plan   * Asthma with COPD with exacerbation [facility])  Assessment & Plan  · Presents from home due to worsening SOB, initially placed on BiPAP. Able to be weaned down to 6 L nasal cannula after breathing treatment, steroids and IV Lasix. · Chronically on 4 L nasal cannula  · Recent admission from 6/2 through 6/6 with COPD exacerbation. DC on prednisone taper. Patient reports compliance and had felt better up until about 2 weeks ago. · Called pulmonology on 7/14 due to SOB. Started on Z-Carlos and prednisone taper  · Finished both of these. Reports improvement initially but sob worsened after completion of steroids  · Home regimen: (Reportedly noncompliant with nebulizer per admission report)  · Bevespi twice daily  · Budesonide twice daily  · Benralizumab monthly injection  · Montelukast  · Xopenex twice daily  · In the ER received IV Solu-Medrol, IV azithromycin and breathing treatments  · Plan:  · Respiratory protocol  · IV Solu-Medrol decreased to 40 mg every 12 hours 7/24  · Xopenex/Atrovent 3 times daily  · Pulmicort twice daily  · Performist twice daily  · Consult pulmonology -appreciate recommendations    Acute on chronic respiratory failure with hypoxia (HCC)  Assessment & Plan  · Chronically on 4 L nasal cannula  · Initially required BiPAP in the ER  · COPD exacerbation being treated with IV steroids and nebulizer treatments  · Respiratory protocol  · Wean oxygen to baseline when able -patient now back to baseline requirement of 4 L    Class 3 severe obesity in adult [facility])  Assessment & Plan  Body mass index is 52.01 kg/m².   · Encourage lifestyle changes   · Consult nutrition     Chronic diastolic congestive heart failure [facility])  Assessment & Plan  Wt Readings from Last 3 Encounters:   07/25/23 133 kg (293 lb 9.6 oz)   06/08/23 129 kg (284 lb)   06/06/23 129 kg (284 lb 9.6 oz)     · Does not appear fluid overloaded on exam. No lung crackles noted. · BNP slightly elevated at 112  · Received IV Lasix 40 mg in the ER. Hold further IV doses at this time  · Continue home torsemide  · Monitor I/O and daily weights    Paroxysmal atrial fibrillation (HCC)  Assessment & Plan  · Home regimen: Eliquis 5 mg twice daily, metoprolol succinate 100 mg daily    Type 2 diabetes mellitus with stage 2 chronic kidney disease, with long-term current use of insulin [facility])  Assessment & Plan  Lab Results   Component Value Date    HGBA1C 9.9 (H) 05/11/2023       Recent Labs     07/24/23  1619 07/24/23  2045 07/25/23  0712 07/25/23  1114   POCGLU 256* 287* 319* 287*       Blood Sugar Average: Last 72 hrs:  · (P) [id number]   · Home regimen: Glargine 30 units daily at bedtime, glulisine 24 units with breakfast, 12 units with lunch and dinner  · Significant steroid-induced hyperglycemia. Increase Lantus to 50 units daily at bedtime, humalog 20 units TID with meals, SSI + accuchek on 7/24  · Diabetic diet  · Consult endocrinology    Obstructive sleep apnea  Assessment & Plan  · CPAP with AutoPap 12-20 at bedtime        VTE Pharmacologic Prophylaxis: VTE Score: 5 High Risk (Score >/= 5) - Pharmacological DVT Prophylaxis Ordered: apixaban (Eliquis). Sequential Compression Devices Ordered. Patient Centered Rounds: I performed bedside rounds with nursing staff today. Discussions with Specialists or Other Care Team Provider: CM    Education and Discussions with Family / Patient: Patient declined call to .      Total Time Spent on Date of Encounter in care of patient: 45 minutes This time was spent on one or more of the following: performing physical exam; counseling and coordination of care; obtaining or reviewing history; documenting in the medical record; reviewing/ordering tests, medications or procedures; communicating with other healthcare professionals and discussing with patient's family/caregivers. Current Length of Stay: 3 day(s)  Current Patient Status: Inpatient   Certification Statement: The patient will continue to require additional inpatient hospital stay due to COPD exacerbation  Discharge Plan: Anticipate discharge in 24-48 hrs to home. Code Status: Level 1 - Full Code    Subjective:   Patient continues to feel improved. Occasional cough but better than yesterday. Denies chest pain/palpitations, nausea/vomiting, abdominal pain. Shortness of breath improving. Objective:     Vitals:   Temp (24hrs), Av.3 °F (36.3 °C), Min:97 °F (36.1 °C), Max:98.1 °F (36.7 °C)    Temp:  [97 °F (36.1 °C)-98.1 °F (36.7 °C)] 97 °F (36.1 °C)  HR:  [69-88] 87  Resp:  [12-18] 12  BP: (105-126)/(53-74) 126/62  SpO2:  [94 %-100 %] 95 %  Body mass index is 52.01 kg/m². Input and Output Summary (last 24 hours): Intake/Output Summary (Last 24 hours) at 2023 1135  Last data filed at 2023 1001  Gross per 24 hour   Intake  ml   Output 3850 ml   Net -1840 ml       Physical Exam:   Physical Exam  Vitals and nursing note reviewed. Constitutional:       Appearance: Normal appearance. Interventions: Nasal cannula in place. Comments: No acute distress   HENT:      Head: Normocephalic. Eyes:      General: No scleral icterus. Extraocular Movements: Extraocular movements intact. Conjunctiva/sclera: Conjunctivae normal.   Cardiovascular:      Rate and Rhythm: Normal rate and regular rhythm. Heart sounds: S1 normal and S2 normal.   Pulmonary:      Effort: Pulmonary effort is normal.      Breath sounds: Decreased breath sounds present. No wheezing, rhonchi or rales. Comments: Overall diminished breath sounds bilaterally  Abdominal:      General: Bowel sounds are normal.      Palpations: Abdomen is soft. Tenderness: There is no abdominal tenderness.  There is no guarding or rebound. Musculoskeletal:         General: No swelling, tenderness or deformity. Cervical back: Normal range of motion. Comments: Able to move upper/lower extremities bilaterally. Trace lower extremity edema   Skin:     General: Skin is warm and dry. Neurological:      Mental Status: She is alert and oriented to person, place, and time. Psychiatric:         Mood and Affect: Mood normal.         Speech: Speech normal.         Behavior: Behavior normal.          Additional Data:     Labs:  Results from last 7 days   Lab Units 07/25/23  0404   WBC Thousand/uL 18.64*   HEMOGLOBIN g/dL 9.6*   HEMATOCRIT % 34.2*   PLATELETS Thousands/uL 307   NEUTROS PCT % 92*   LYMPHS PCT % 5*   MONOS PCT % 2*   EOS PCT % 0     Results from last 7 days   Lab Units 07/25/23  0404 07/24/23  0545   SODIUM mmol/L 138 137   POTASSIUM mmol/L 3.9 3.8   CHLORIDE mmol/L 91* 91*   CO2 mmol/L 39* 40*   BUN mg/dL 18 17   CREATININE mg/dL 0.67 0.61   ANION GAP mmol/L 8 6   CALCIUM mg/dL 8.5 8.6   ALBUMIN g/dL  --  3.8   TOTAL BILIRUBIN mg/dL  --  0.48   ALK PHOS U/L  --  119*   ALT U/L  --  34   AST U/L  --  10*   GLUCOSE RANDOM mg/dL 268* 335*         Results from last 7 days   Lab Units 07/25/23  1114 07/25/23  0712 07/24/23  2045 07/24/23  1619 07/24/23  1124 07/24/23  0744 07/23/23  2101 07/23/23  2025 07/23/23  1919 07/23/23  1735 07/23/23  1231 07/23/23  0858   POC GLUCOSE mg/dl 287* 319* 287* 256* 359* 373* 437* 468* >500* >500* 410* 366*         Results from last 7 days   Lab Units 07/23/23  0532   PROCALCITONIN ng/ml 0.06       Lines/Drains:  Invasive Devices     Peripheral Intravenous Line  Duration           Peripheral IV 07/22/23 Right Antecubital 2 days                      Imaging: No pertinent imaging reviewed.     Recent Cultures (last 7 days):         Last 24 Hours Medication List:   Current Facility-Administered Medications   Medication Dose Route Frequency Provider Last Rate   • acetaminophen  650 mg Oral Q6H PRN Tosin Crocker PA-C     • albuterol  2 puff Inhalation Q4H PRN Tosin Crocker PA-C     • apixaban  5 mg Oral BID Tosin Crocker PA-C     • atorvastatin  40 mg Oral Daily Tosin Crocker PA-C     • budesonide  0.5 mg Nebulization BID Erik Franks MD     • escitalopram  20 mg Oral Daily Tosin Crocker PA-C     • fluticasone  1 spray Nasal BID Tosin Crocker PA-C     • formoterol  20 mcg Nebulization Q12H Tosin Crocker PA-C     • insulin glargine  50 Units Subcutaneous HS Daniela Johnson PA-C     • insulin lispro  2-12 Units Subcutaneous TID [facility] Daniela Johnson PA-C     • insulin lispro  2-12 Units Subcutaneous HS Daniela Johnson PA-C     • insulin lispro  20 Units Subcutaneous TID With Meals Daniela Johnson PA-C     • ipratropium  0.5 mg Nebulization TID Tosin Crocker PA-C     • levalbuterol  1.25 mg Nebulization TID Tosin Crocker PA-C     • loratadine  10 mg Oral Daily Tosin Crocker PA-C     • metoprolol succinate  100 mg Oral Daily Rosamaria Miller PA-C     • montelukast  10 mg Oral HS Rosamaria Miller PA-C     • pantoprazole  40 mg Oral Early Morning Rosamaria Miller PA-C     • potassium chloride  20 mEq Oral BID Tosin Crocker PA-C     • [START ON 8/4/2023] predniSONE  10 mg Oral Daily Walker Be MD     • [START ON 8/1/2023] predniSONE  20 mg Oral Daily Walker Be MD     • [START ON 7/29/2023] predniSONE  30 mg Oral Daily Walker Be MD     • [START ON 7/26/2023] predniSONE  40 mg Oral Daily Walker Be MD     • torsemide  60 mg Oral BID Tosin Crocker PA-C     • traZODone  150 mg Oral HS Tosin Crocker PA-C          Today, Patient Was Seen By: Daniela Johnson PA-C    **Please Note: This note may have been constructed using a voice recognition system. **

## 2023-07-25 NOTE — PROGRESS NOTES
Pulmonary Progress Note  Didi Liriano 57 y.o. female MRN: [id number]  Unit/Bed#: -01 Encounter: [id number]      Assessment  1. Acute on chronic hypoxic and hypercapnic respiratory failure  2. Asthma/COPD overlap syndrome with acute exacerbation  3. Heart failure with preserved ejection fraction  4. Obstructive sleep apnea  5. Morbid obesity      Recommendations:   -wean to 4L, which is her baseline  -She had a BiPAP titration scheduled for tonight, and I have messaged the sleep lab to reschedule this  -Monitor off antibiotics for now  -Continues nebulized therapy as an inpatient  -c/w montelukast  -diuresis per primary team  -Decreasing steroids to prednisone 40 mg tomorrow, taper by 10 mg every 3 days. Monitor blood sugars while on steroids  -On Bevespi as an outpatient with as needed albuterol nebulized and Pulmicort  -Also was started on Marlise Leavens, but prescription had ran out, reordered in June. I will follow-up with our coordinator to see whether the injections will be sent soon. I have also asked the patient to speak to her pharmacy -[facility]  -Repeat VBG shows increased PCO2 to 69 despite wearing CPAP of 12 cm H2O with supplemental oxygen overnight. I have ordered overnight pulse ox on her CPAP settings and ABG for tomorrow to see if she will qualify for bilevel PAP. Chief Complaint: Feeling a little better    Subjective:  Examined on baseline oxygen, SPO2 95%. Shortness of breath is improving a little bit. Leg swelling still present. Objective:  Vitals: Vitals personally reviewed  Vitals:    07/25/23 0357 07/25/23 0718 07/25/23 0820 07/25/23 0857   BP:  108/53  126/62   BP Location:       Pulse:  70  87   Resp:  12     Temp:  (!) 97 °F (36.1 °C)     TempSrc:  Temporal     SpO2:  95% 99% 95%   Weight: 133 kg (293 lb 9.6 oz)      Height:          Body mass index is 52.01 kg/m².     Intake/Output Summary (Last 24 hours) at 7/25/2023 1403  Last data filed at 7/25/2023 1201  Gross per 24 hour Intake 2370 ml   Output 3850 ml   Net -1480 ml     Invasive Devices     Peripheral Intravenous Line  Duration           Peripheral IV 07/22/23 Right Antecubital 2 days                Physical Exam  General:  Patient is sitting in chair, awake, alert, non-toxic and in no acute respiratory distress  HEET: head is normocephalic, atraumatic. EOMI, no scleral icterus  Neck: Supple, with full range of motion, no appreciable JVD  CV:  Regular rhythm, +S1 S2  Lungs: Clear to station bilaterally with no wheezing  Abdomen: Soft, +BS, non-tender, non-distended  Extremities: No clubbing, cyanosis. Trace pitting edema in the lower extremities  Neuro: No focal motor/sensory deficits.   Alert and oriented  Skin: Warm, No rashes or ulcerations appreciable      Labs:   ABG: No results found for: "PHART", "PYL9YNC", "PO2ART", "UQN1FKB", "U0TYFSWY", "BEART", "SOURCE", BNP: No results found for: "BNP", CBC:   Lab Results   Component Value Date    WBC 18.64 (H) 07/25/2023    HGB 9.6 (L) 07/25/2023    HCT 34.2 (L) 07/25/2023    MCV 69 (L) 07/25/2023     07/25/2023    RBC 4.98 07/25/2023    MCH 19.3 (L) 07/25/2023    MCHC 28.1 (L) 07/25/2023    RDW 19.6 (H) 07/25/2023    MPV 10.5 07/25/2023    NRBC 0 07/25/2023   , CMP:   Lab Results   Component Value Date    SODIUM 138 07/25/2023    K 3.9 07/25/2023    CL 91 (L) 07/25/2023    CO2 39 (H) 07/25/2023    BUN 18 07/25/2023    CREATININE 0.67 07/25/2023    CALCIUM 8.5 07/25/2023    EGFR 93 07/25/2023     Laboratory and Diagnostics  Results from last 7 days   Lab Units 07/25/23  0404 07/24/23  0545 07/23/23  0532 07/22/23  2223 07/22/23  2108   WBC Thousand/uL 18.64* 14.32* 19.40* 14.51*  --    HEMOGLOBIN g/dL 9.6* 8.5* 9.2* 9.3*  --    I STAT HEMOGLOBIN g/dl  --   --   --   --  11.2*   HEMATOCRIT % 34.2* 29.9* 33.2* 32.6*  --    HEMATOCRIT, ISTAT %  --   --   --   --  33*   PLATELETS Thousands/uL 307 300 308 297  --    NEUTROS PCT % 92* 91* 95* 79*  --    MONOS PCT % 2* 3* 0* 5  -- EOS PCT % 0 0 0 2  --      Results from last 7 days   Lab Units 23  0404 23  0545 23  1940 23  0532 23  2223 23  2108   SODIUM mmol/L 138 137 133* 139 136  --    POTASSIUM mmol/L 3.9 3.8 3.7 3.9 3.5  --    CHLORIDE mmol/L 91* 91* 89* 94* 92*  --    CO2 mmol/L 39* 40* 32 35* 37*  --    CO2, I-STAT mmol/L  --   --   --   --   --  38*   ANION GAP mmol/L 8 6 12 10 7  --    BUN mg/dL 18 17 17 12 10  --    CREATININE mg/dL 0.67 0.61 0.87 0.71 0.67  --    CALCIUM mg/dL 8.5 8.6 8.2* 8.2* 8.3*  --    GLUCOSE RANDOM mg/dL 268* 335* 507* 300* 293*  --    ALT U/L  --  34  --   --  54*  --    AST U/L  --  10*  --   --  21  --    ALK PHOS U/L  --  119*  --   --  148*  --    ALBUMIN g/dL  --  3.8  --   --  4.2  --    TOTAL BILIRUBIN mg/dL  --  0.48  --   --  0.48  --      Results from last 7 days   Lab Units 23  2223   MAGNESIUM mg/dL 2.1                                   Results from last 7 days   Lab Units 23  0532   PROCALCITONIN ng/ml 0.06           ABG:       Imaging and other studies: I have personally reviewed pertinent films in PACS  CXR 23 -no consolidation, effusions, pneumothorax  CT Chest 6/3/23 -scattered areas of hyperlucency likely representing air trapping. Innumerable small pulmonary nodules appear unchanged from at least       Pulmonary function testin/2022Results:  FEV1/FVC Ratio: 57 %  Forced Vital Capacity:  1.08L    36 % predicted  FEV1: 0.62 L     26 % predicted  After administration of bronchodilator FEV1: 0.84 (+36%)     Lung volumes by body plethysmography: Total Lung Capacity 102 % predicted Residual volume 175 % predicted  RV/TLC ratio 171%     DLCO corrected for patients hemoglobin level: 54 %     6MWT:  Walked total of 2 mins; distance 109.6m      Echocardiogram:   10/2020•  Left Ventricle: Left ventricular cavity size is normal. Wall thickness is normal. The left ventricular ejection fraction is 60%.  Systolic function is normal. Wall motion is normal. Diastolic function is moderately abnormal, consistent with grade II (pseudonormal) relaxation. •  Left Atrium: The atrium is mildly dilated. •  Tricuspid Valve: There is mild regurgitation. The right ventricular systolic pressure is mildly to moderately elevated. The estimated right ventricular systolic pressure is 49.75 mmHg. Code Status: Level 1 - Full Code      Kelechi Lauren MD  Pulmonary, Critical Care and Sleep Medicine  [facility]     Portions of the record may have been created with voice recognition software. Occasional wrong word or "sound a like" substitutions may have occurred due to the inherent limitations of voice recognition software. Please read the chart carefully and recognize, using context, where substitutions have occurred.

## 2023-07-25 NOTE — ASSESSMENT & PLAN NOTE
Lab Results   Component Value Date    HGBA1C 9.9 (H) 05/11/2023       Recent Labs     07/24/23  1619 07/24/23  2045 07/25/23  0712 07/25/23  1114   POCGLU 256* 287* 319* 287*       Blood Sugar Average: Last 72 hrs:  · (P) 358.0880253291981082   · Home regimen: Glargine 30 units daily at bedtime, glulisine 24 units with breakfast, 12 units with lunch and dinner  · Significant steroid-induced hyperglycemia.   Increase Lantus to 50 units daily at bedtime, humalog 20 units TID with meals, SSI + accuchek on 7/24  · Diabetic diet  · Consult endocrinology

## 2023-07-25 NOTE — PLAN OF CARE
Problem: MOBILITY - ADULT  Goal: Maintain or return to baseline ADL function  Description: INTERVENTIONS:  -  Assess patient's ability to carry out ADLs; assess patient's baseline for ADL function and identify physical deficits which impact ability to perform ADLs (bathing, care of mouth/teeth, toileting, grooming, dressing, etc.)  - Assess/evaluate cause of self-care deficits   - Assess range of motion  - Assess patient's mobility; develop plan if impaired  - Assess patient's need for assistive devices and provide as appropriate  - Encourage maximum independence but intervene and supervise when necessary  - Involve family in performance of ADLs  - Assess for home care needs following discharge   - Consider OT consult to assist with ADL evaluation and planning for discharge  - Provide patient education as appropriate  Outcome: Progressing  Goal: Maintains/Returns to pre admission functional level  Description: INTERVENTIONS:  - Perform BMAT or MOVE assessment daily.   - Set and communicate daily mobility goal to care team and patient/family/caregiver. - Collaborate with rehabilitation services on mobility goals if consulted  - Perform Range of Motion 3 times a day. - Reposition patient every 2 hours.   - Dangle patient 3 times a day  - Stand patient 3 times a day  - Ambulate patient 3 times a day  - Out of bed to chair 3 times a day   - Out of bed for meals 3 times a day  - Out of bed for toileting  - Record patient progress and toleration of activity level   Outcome: Progressing     Problem: PAIN - ADULT  Goal: Verbalizes/displays adequate comfort level or baseline comfort level  Description: Interventions:  - Encourage patient to monitor pain and request assistance  - Assess pain using appropriate pain scale  - Administer analgesics based on type and severity of pain and evaluate response  - Implement non-pharmacological measures as appropriate and evaluate response  - Consider cultural and social influences on pain and pain management  - Notify physician/advanced practitioner if interventions unsuccessful or patient reports new pain  Outcome: Progressing     Problem: INFECTION - ADULT  Goal: Absence or prevention of progression during hospitalization  Description: INTERVENTIONS:  - Assess and monitor for signs and symptoms of infection  - Monitor lab/diagnostic results  - Monitor all insertion sites, i.e. indwelling lines, tubes, and drains  - Monitor endotracheal if appropriate and nasal secretions for changes in amount and color  - [place] appropriate cooling/warming therapies per order  - Administer medications as ordered  - Instruct and encourage patient and family to use good hand hygiene technique  - Identify and instruct in appropriate isolation precautions for identified infection/condition  Outcome: Progressing  Goal: Absence of fever/infection during neutropenic period  Description: INTERVENTIONS:  - Monitor WBC    Outcome: Progressing     Problem: SAFETY ADULT  Goal: Maintain or return to baseline ADL function  Description: INTERVENTIONS:  -  Assess patient's ability to carry out ADLs; assess patient's baseline for ADL function and identify physical deficits which impact ability to perform ADLs (bathing, care of mouth/teeth, toileting, grooming, dressing, etc.)  - Assess/evaluate cause of self-care deficits   - Assess range of motion  - Assess patient's mobility; develop plan if impaired  - Assess patient's need for assistive devices and provide as appropriate  - Encourage maximum independence but intervene and supervise when necessary  - Involve family in performance of ADLs  - Assess for home care needs following discharge   - Consider OT consult to assist with ADL evaluation and planning for discharge  - Provide patient education as appropriate  Outcome: Progressing  Goal: Maintains/Returns to pre admission functional level  Description: INTERVENTIONS:  - Perform BMAT or MOVE assessment daily.   - Set and communicate daily mobility goal to care team and patient/family/caregiver. - Collaborate with rehabilitation services on mobility goals if consulted  - Perform Range of Motion 3 times a day. - Reposition patient every 2 hours.   - Dangle patient 3 times a day  - Stand patient 3 times a day  - Ambulate patient 3 times a day  - Out of bed to chair 3 times a day   - Out of bed for meals 3 times a day  - Out of bed for toileting  - Record patient progress and toleration of activity level   Outcome: Progressing  Goal: Patient will remain free of falls  Description: INTERVENTIONS:  - Educate patient/family on patient safety including physical limitations  - Instruct patient to call for assistance with activity   - Consult OT/PT to assist with strengthening/mobility   - Keep Call bell within reach  - Keep bed low and locked with side rails adjusted as appropriate  - Keep care items and personal belongings within reach  - Initiate and maintain comfort rounds  - Make Fall Risk Sign visible to staff  - Offer Toileting every 2 Hours, in advance of need  - Initiate/Maintain bed/ chair alarm  - Obtain necessary fall risk management equipment: n/a  - Apply yellow socks and bracelet for high fall risk patients  - Consider moving patient to room near nurses station  Outcome: Progressing     Problem: DISCHARGE PLANNING  Goal: Discharge to home or other facility with appropriate resources  Description: INTERVENTIONS:  - Identify barriers to discharge w/patient and caregiver  - Arrange for needed discharge resources and transportation as appropriate  - Identify discharge learning needs (meds, wound care, etc.)  - Arrange for interpretive services to assist at discharge as needed  - Refer to Case Management Department for coordinating discharge planning if the patient needs post-hospital services based on physician/advanced practitioner order or complex needs related to functional status, cognitive ability, or social support system  Outcome: Progressing     Problem: Knowledge Deficit  Goal: Patient/family/caregiver demonstrates understanding of disease process, treatment plan, medications, and discharge instructions  Description: Complete learning assessment and assess knowledge base.   Interventions:  - Provide teaching at level of understanding  - Provide teaching via preferred learning methods  Outcome: Progressing     Problem: RESPIRATORY - ADULT  Goal: Achieves optimal ventilation and oxygenation  Description: INTERVENTIONS:  - Assess for changes in respiratory status  - Assess for changes in mentation and behavior  - Position to facilitate oxygenation and minimize respiratory effort  - Oxygen administered by appropriate delivery if ordered  - Initiate smoking cessation education as indicated  - Encourage broncho-pulmonary hygiene including cough, deep breathe, Incentive Spirometry  - Assess the need for suctioning and aspirate as needed  - Assess and instruct to report SOB or any respiratory difficulty  - Respiratory Therapy support as indicated  Outcome: Progressing

## 2023-07-25 NOTE — ASSESSMENT & PLAN NOTE
· Presents from home due to worsening SOB, initially placed on BiPAP. Able to be weaned down to 6 L nasal cannula after breathing treatment, steroids and IV Lasix. · Chronically on 4 L nasal cannula  · Recent admission from 6/2 through 6/6 with COPD exacerbation. DC on prednisone taper. Patient reports compliance and had felt better up until about 2 weeks ago. · Called pulmonology on 7/14 due to SOB. Started on Z-Carlos and prednisone taper  · Finished both of these.  Reports improvement initially but sob worsened after completion of steroids  · Home regimen: (Reportedly noncompliant with nebulizer per admission report)  · Bevespi twice daily  · Budesonide twice daily  · Benralizumab monthly injection  · Montelukast  · Xopenex twice daily  · In the ER received IV Solu-Medrol, IV azithromycin and breathing treatments  · Plan:  · Respiratory protocol  · IV Solu-Medrol decreased to 40 mg every 12 hours 7/24  · Xopenex/Atrovent 3 times daily  · Pulmicort twice daily  · Performist twice daily  · Consult pulmonology -appreciate recommendations

## 2023-07-25 NOTE — ASSESSMENT & PLAN NOTE
Wt Readings from Last 3 Encounters:   07/25/23 133 kg (293 lb 9.6 oz)   06/08/23 129 kg (284 lb)   06/06/23 129 kg (284 lb 9.6 oz)     · Does not appear fluid overloaded on exam. No lung crackles noted. · BNP slightly elevated at 112  · Received IV Lasix 40 mg in the ER.   Hold further IV doses at this time  · Continue home torsemide  · Monitor I/O and daily weights

## 2023-07-26 ENCOUNTER — TELEPHONE (OUTPATIENT)
Dept: FAMILY MEDICINE CLINIC | Facility: HOSPITAL | Age: 63
End: 2023-07-26

## 2023-07-26 VITALS
HEART RATE: 63 BPM | RESPIRATION RATE: 12 BRPM | WEIGHT: 291.6 LBS | DIASTOLIC BLOOD PRESSURE: 51 MMHG | HEIGHT: 63 IN | TEMPERATURE: 96.8 F | SYSTOLIC BLOOD PRESSURE: 117 MMHG | BODY MASS INDEX: 51.67 KG/M2 | OXYGEN SATURATION: 93 %

## 2023-07-26 DIAGNOSIS — J44.9 CHRONIC OBSTRUCTIVE PULMONARY DISEASE, UNSPECIFIED COPD TYPE (HCC): ICD-10-CM

## 2023-07-26 DIAGNOSIS — G47.00 INSOMNIA, UNSPECIFIED TYPE: ICD-10-CM

## 2023-07-26 DIAGNOSIS — I13.0 HYPERTENSIVE HEART AND CHRONIC KIDNEY DISEASE WITH HEART FAILURE AND STAGE 1 THROUGH STAGE 4 CHRONIC KIDNEY DISEASE, OR CHRONIC KIDNEY DISEASE (HCC): ICD-10-CM

## 2023-07-26 DIAGNOSIS — F32.1 CURRENT MODERATE EPISODE OF MAJOR DEPRESSIVE DISORDER WITHOUT PRIOR EPISODE (HCC): ICD-10-CM

## 2023-07-26 LAB
ANION GAP SERPL CALCULATED.3IONS-SCNC: 6 MMOL/L
ARTERIAL PATENCY WRIST A: YES
BASE EXCESS BLDA CALC-SCNC: 12.2 MMOL/L
BASOPHILS # BLD AUTO: 0.02 THOUSANDS/ÂΜL (ref 0–0.1)
BASOPHILS NFR BLD AUTO: 0 % (ref 0–1)
BUN SERPL-MCNC: 17 MG/DL (ref 5–25)
CALCIUM SERPL-MCNC: 7.9 MG/DL (ref 8.4–10.2)
CHLORIDE SERPL-SCNC: 90 MMOL/L (ref 96–108)
CO2 SERPL-SCNC: 43 MMOL/L (ref 21–32)
CREAT SERPL-MCNC: 0.65 MG/DL (ref 0.6–1.3)
EOSINOPHIL # BLD AUTO: 0.11 THOUSAND/ÂΜL (ref 0–0.61)
EOSINOPHIL NFR BLD AUTO: 1 % (ref 0–6)
ERYTHROCYTE [DISTWIDTH] IN BLOOD BY AUTOMATED COUNT: 19 % (ref 11.6–15.1)
GFR SERPL CREATININE-BSD FRML MDRD: 94 ML/MIN/1.73SQ M
GLUCOSE SERPL-MCNC: 130 MG/DL (ref 65–140)
GLUCOSE SERPL-MCNC: 157 MG/DL (ref 65–140)
GLUCOSE SERPL-MCNC: 205 MG/DL (ref 65–140)
GLUCOSE SERPL-MCNC: 267 MG/DL (ref 65–140)
HCO3 BLDA-SCNC: 37.8 MMOL/L (ref 22–28)
HCT VFR BLD AUTO: 32.1 % (ref 34.8–46.1)
HGB BLD-MCNC: 9 G/DL (ref 11.5–15.4)
IMM GRANULOCYTES # BLD AUTO: 0.07 THOUSAND/UL (ref 0–0.2)
IMM GRANULOCYTES NFR BLD AUTO: 0 % (ref 0–2)
LYMPHOCYTES # BLD AUTO: 2.81 THOUSANDS/ÂΜL (ref 0.6–4.47)
LYMPHOCYTES NFR BLD AUTO: 16 % (ref 14–44)
MCH RBC QN AUTO: 19.3 PG (ref 26.8–34.3)
MCHC RBC AUTO-ENTMCNC: 28 G/DL (ref 31.4–37.4)
MCV RBC AUTO: 69 FL (ref 82–98)
MONOCYTES # BLD AUTO: 1.38 THOUSAND/ÂΜL (ref 0.17–1.22)
MONOCYTES NFR BLD AUTO: 8 % (ref 4–12)
NEUTROPHILS # BLD AUTO: 12.98 THOUSANDS/ÂΜL (ref 1.85–7.62)
NEUTS SEG NFR BLD AUTO: 75 % (ref 43–75)
NRBC BLD AUTO-RTO: 0 /100 WBCS
O2 CT BLDA-SCNC: 12 ML/DL (ref 16–23)
OXYHGB MFR BLDA: 88.4 % (ref 94–97)
PCO2 BLDA: 55.1 MM HG (ref 36–44)
PH BLDA: 7.45 [PH] (ref 7.35–7.45)
PLATELET # BLD AUTO: 302 THOUSANDS/UL (ref 149–390)
PMV BLD AUTO: 10.3 FL (ref 8.9–12.7)
PO2 BLDA: 60.6 MM HG (ref 75–129)
POTASSIUM SERPL-SCNC: 3.2 MMOL/L (ref 3.5–5.3)
RBC # BLD AUTO: 4.67 MILLION/UL (ref 3.81–5.12)
SODIUM SERPL-SCNC: 139 MMOL/L (ref 135–147)
SPECIMEN SOURCE: ABNORMAL
WBC # BLD AUTO: 17.37 THOUSAND/UL (ref 4.31–10.16)

## 2023-07-26 PROCEDURE — 82948 REAGENT STRIP/BLOOD GLUCOSE: CPT

## 2023-07-26 PROCEDURE — 99239 HOSP IP/OBS DSCHRG MGMT >30: CPT | Performed by: PHYSICIAN ASSISTANT

## 2023-07-26 PROCEDURE — NC001 PR NO CHARGE: Performed by: INTERNAL MEDICINE

## 2023-07-26 PROCEDURE — 82805 BLOOD GASES W/O2 SATURATION: CPT | Performed by: FAMILY MEDICINE

## 2023-07-26 PROCEDURE — 94003 VENT MGMT INPAT SUBQ DAY: CPT

## 2023-07-26 PROCEDURE — 94760 N-INVAS EAR/PLS OXIMETRY 1: CPT

## 2023-07-26 PROCEDURE — 85025 COMPLETE CBC W/AUTO DIFF WBC: CPT | Performed by: FAMILY MEDICINE

## 2023-07-26 PROCEDURE — 99232 SBSQ HOSP IP/OBS MODERATE 35: CPT | Performed by: INTERNAL MEDICINE

## 2023-07-26 PROCEDURE — 94660 CPAP INITIATION&MGMT: CPT

## 2023-07-26 PROCEDURE — 80048 BASIC METABOLIC PNL TOTAL CA: CPT | Performed by: FAMILY MEDICINE

## 2023-07-26 PROCEDURE — 94640 AIRWAY INHALATION TREATMENT: CPT

## 2023-07-26 RX ORDER — INSULIN GLULISINE 100 [IU]/ML
INJECTION, SOLUTION SUBCUTANEOUS
Qty: 15 ML | Refills: 0
Start: 2023-07-26

## 2023-07-26 RX ORDER — INSULIN GLARGINE 100 [IU]/ML
40 INJECTION, SOLUTION SUBCUTANEOUS
Refills: 0
Start: 2023-07-26

## 2023-07-26 RX ORDER — LORAZEPAM 0.5 MG/1
0.5 TABLET ORAL
Status: DISCONTINUED | OUTPATIENT
Start: 2023-07-26 | End: 2023-07-26 | Stop reason: HOSPADM

## 2023-07-26 RX ORDER — PREDNISONE 20 MG/1
TABLET ORAL
Qty: 13 TABLET | Refills: 0 | Status: SHIPPED | OUTPATIENT
Start: 2023-07-27 | End: 2023-08-07

## 2023-07-26 RX ORDER — POTASSIUM CHLORIDE 20 MEQ/1
40 TABLET, EXTENDED RELEASE ORAL ONCE
Status: COMPLETED | OUTPATIENT
Start: 2023-07-26 | End: 2023-07-26

## 2023-07-26 RX ORDER — POTASSIUM CHLORIDE 14.9 MG/ML
20 INJECTION INTRAVENOUS ONCE
Status: COMPLETED | OUTPATIENT
Start: 2023-07-26 | End: 2023-07-26

## 2023-07-26 RX ADMIN — INSULIN LISPRO 20 UNITS: 100 INJECTION, SOLUTION INTRAVENOUS; SUBCUTANEOUS at 12:23

## 2023-07-26 RX ADMIN — INSULIN LISPRO 4 UNITS: 100 INJECTION, SOLUTION INTRAVENOUS; SUBCUTANEOUS at 10:48

## 2023-07-26 RX ADMIN — ESCITALOPRAM OXALATE 20 MG: 20 TABLET ORAL at 08:15

## 2023-07-26 RX ADMIN — IPRATROPIUM BROMIDE 0.5 MG: 0.5 SOLUTION RESPIRATORY (INHALATION) at 08:34

## 2023-07-26 RX ADMIN — INSULIN LISPRO 2 UNITS: 100 INJECTION, SOLUTION INTRAVENOUS; SUBCUTANEOUS at 07:44

## 2023-07-26 RX ADMIN — ACETAMINOPHEN 650 MG: 325 TABLET, FILM COATED ORAL at 10:46

## 2023-07-26 RX ADMIN — POTASSIUM CHLORIDE 40 MEQ: 1500 TABLET, EXTENDED RELEASE ORAL at 07:44

## 2023-07-26 RX ADMIN — LEVALBUTEROL HYDROCHLORIDE 1.25 MG: 1.25 SOLUTION RESPIRATORY (INHALATION) at 08:34

## 2023-07-26 RX ADMIN — INSULIN LISPRO 20 UNITS: 100 INJECTION, SOLUTION INTRAVENOUS; SUBCUTANEOUS at 16:48

## 2023-07-26 RX ADMIN — PANTOPRAZOLE SODIUM 40 MG: 40 TABLET, DELAYED RELEASE ORAL at 04:34

## 2023-07-26 RX ADMIN — BUDESONIDE 0.5 MG: 0.5 INHALANT ORAL at 08:34

## 2023-07-26 RX ADMIN — LORAZEPAM 0.5 MG: 0.5 TABLET ORAL at 04:33

## 2023-07-26 RX ADMIN — APIXABAN 5 MG: 5 TABLET, FILM COATED ORAL at 17:59

## 2023-07-26 RX ADMIN — INSULIN LISPRO 20 UNITS: 100 INJECTION, SOLUTION INTRAVENOUS; SUBCUTANEOUS at 07:44

## 2023-07-26 RX ADMIN — FORMOTEROL FUMARATE DIHYDRATE 20 MCG: 20 SOLUTION RESPIRATORY (INHALATION) at 08:37

## 2023-07-26 RX ADMIN — FLUTICASONE PROPIONATE 1 SPRAY: 50 SPRAY, METERED NASAL at 08:16

## 2023-07-26 RX ADMIN — POTASSIUM CHLORIDE 20 MEQ: 14.9 INJECTION, SOLUTION INTRAVENOUS at 07:54

## 2023-07-26 RX ADMIN — METOPROLOL SUCCINATE 100 MG: 50 TABLET, EXTENDED RELEASE ORAL at 08:16

## 2023-07-26 RX ADMIN — ATORVASTATIN CALCIUM 40 MG: 40 TABLET, FILM COATED ORAL at 08:15

## 2023-07-26 RX ADMIN — APIXABAN 5 MG: 5 TABLET, FILM COATED ORAL at 08:15

## 2023-07-26 RX ADMIN — LORATADINE 10 MG: 10 TABLET ORAL at 08:15

## 2023-07-26 RX ADMIN — IPRATROPIUM BROMIDE 0.5 MG: 0.5 SOLUTION RESPIRATORY (INHALATION) at 14:30

## 2023-07-26 RX ADMIN — INSULIN LISPRO 6 UNITS: 100 INJECTION, SOLUTION INTRAVENOUS; SUBCUTANEOUS at 16:48

## 2023-07-26 RX ADMIN — TORSEMIDE 60 MG: 20 TABLET ORAL at 17:59

## 2023-07-26 RX ADMIN — PREDNISONE 40 MG: 20 TABLET ORAL at 08:15

## 2023-07-26 RX ADMIN — TORSEMIDE 60 MG: 20 TABLET ORAL at 08:16

## 2023-07-26 RX ADMIN — LEVALBUTEROL HYDROCHLORIDE 1.25 MG: 1.25 SOLUTION RESPIRATORY (INHALATION) at 14:30

## 2023-07-26 NOTE — CASE MANAGEMENT
Case Management Progress Note    Patient name Lupe Sarabia  Location /-04 MRN [id number]  : 1960 Date 2023       LOS (days): 4  Geometric Mean LOS (GMLOS) (days):   Days to GMLOS:        OBJECTIVE:        Current admission status: Inpatient  Preferred Pharmacy:   [facility]/pharmacy #[id number] MARIELA Christine - [address] [facility]  [address] [place] [address]  Phone: [phone] Fax: [phone]    [facility] [address] [place] [address] [address]  Phone: [phone] Fax: [phone]    Primary Care Provider: Cesilia Jensen MD    Primary Insurance: [employer]  Secondary Insurance:     PROGRESS NOTE:    CM spoke to Ngoc Pérez clinical specialist with [facility] who states after review of Bipap order, pt would benefit from Astral non-invasive ventilator. CM discussed with Dr. Luis Felipe Beyer who is agreeable. Ngoc Pérez states pt has coverage for Astral non-invasive ventilator--no copay. Ngoc Pérez states Jacqueline with [facility] will meet with pt in the hospital around 3-3:30pm to fit her for Astral non-invasive ventilator. Dr. Luis Felipe Beyer completed script for Astral non-invasive ventilator. Script and requested clinical sent to Ngoc Pérez with [facility].

## 2023-07-26 NOTE — ASSESSMENT & PLAN NOTE
· Patient underwent overnight pulse ox study and morning ABG  · Now qualified for BiPAP qhs which has been arranged by case management

## 2023-07-26 NOTE — ASSESSMENT & PLAN NOTE
Lab Results   Component Value Date    HGBA1C 9.9 (H) 05/11/2023       Recent Labs     07/25/23  1621 07/25/23  2109 07/26/23  0736 07/26/23  1047   POCGLU 252* 276* 157* 205*       Blood Sugar Average: Last 72 hrs:  · (P) 322   · Home regimen: Glargine 30 units daily at bedtime, glulisine 24 units with breakfast, 12 units with lunch and dinner  · Significant steroid-induced hyperglycemia on admit  · Increase home regimen to glargine 40 units at bedtime, glusiline 24 units with breakfast and 15 units lunch/dinner.   Discussed close monitoring of sugars for any hyper/hypoglycemia

## 2023-07-26 NOTE — PROGRESS NOTES
Patient has chronic hypercapnic respiratory failure due to COPD. She has evidence for CO2 retention and requires noninvasive ventilation at home for optimal management. BiPAP was considered and ruled out to the inability to provide noninvasive ventilation of adequate degree. Continuous alarm systems and backup are required for optimal management due to power outage. This patient is at high risk for respiratory failure without noninvasive ventilation at home. This could prevent repeated hospital admissions and improve the quality of life for the patient.

## 2023-07-26 NOTE — CASE MANAGEMENT
Case Management Discharge Planning Note    Patient name Makayla Wade  Location [address] [facility] 222/-19 MRN [id number]  : 1960 Date 2023       Current Admission Date: 2023  Current Admission Diagnosis:Asthma with COPD with exacerbation [facility])   Patient Active Problem List    Diagnosis Date Noted   • Eosinophilic asthma    • COPD, severe ([address]) 2023   • Hepatic steatosis 2023   • Morbid obesity ([address]) 02/15/2023   • Diabetic polyneuropathy associated with type 2 diabetes mellitus ([address]) 11/15/2021   • Type 2 diabetes mellitus with hyperglycemia ([address]) 2021   • Tubular adenoma of colon 2021   • Lower gastrointestinal bleeding 2021   • Transaminitis 2021   • Gastric polyp 2021   • Class 3 severe obesity in adult [facility]) 2021   • Pulmonary hypertension ([address]) 2020   • Asthma with COPD with exacerbation ([address]) 2020   • Chronic hypercapnic respiratory failure ([address]) 2020   • Insomnia 10/28/2020   • Abnormal CT of the chest 2020   • Asthma-COPD overlap syndrome ([address]) 2020   • Lactic acidosis 2020   • Chronic diastolic congestive heart failure ([address]) 2020   • Microcytic anemia 2020   • Neck pain, chronic 2019   • Current moderate episode of major depressive disorder without prior episode ([address]) 2019   • Paroxysmal atrial fibrillation ([address]) 2018   • Severe persistent asthma 2018   • Acute on chronic respiratory failure with hypoxia ([address]) 2018   • Abnormal computed tomography angiography (CTA) of abdomen 2018   • Abnormal CT of the abdomen 2018   • Iron deficiency anemia due to chronic blood loss 2018   • Type 2 diabetes mellitus with stage 2 chronic kidney disease, with long-term current use of insulin ([address])    • Severe persistent asthma with exacerbation 2018   • Ganglion cyst of flexor tendon sheath 2017   • Paresthesia of upper extremity 2017   • Cervical radiculopathy 09/13/2017   • Elevated liver enzymes 12/30/2015   • Sexual dysfunction 11/11/2014   • Chronic low back pain 10/15/2014   • Hypercholesterolemia 09/09/2014   • Lumbar radiculopathy 09/09/2014   • Esophageal reflux 06/23/2014   • Hypertensive heart and chronic kidney disease with heart failure and stage 1 through stage 4 chronic kidney disease, or chronic kidney disease ([address] [facility]) 03/24/2014   • Obstructive sleep apnea 03/09/2014      LOS (days): 4  Geometric Mean LOS (GMLOS) (days):   Days to GMLOS:     OBJECTIVE:  Risk of Unplanned Readmission Score: 23.24         Current admission status: Inpatient   Preferred Pharmacy:   [id number] [id number]  Phone: [phone] Fax: [phone]    [address] [place] [address] [address] [place] [address] [address]  Phone: [phone] Fax: [phone]    Primary Care Provider: Pao De Jesus MD    Primary Insurance: [facility]  Secondary Insurance:     DISCHARGE DETAILS:                                     DME Referral Provided  Referral made for DME?: Yes  DME referral completed for the following items[de-identified] BiPAP  DME Supplier Name[de-identified] [employer]       Per Pulmonology, pt would benefit from [address]. Bipap ordered via [facility]. Awaiting confirmation. [employer] will be in to fit pt for Bipap between 3-330p today.  Provider and RN made aware via TT.

## 2023-07-26 NOTE — ASSESSMENT & PLAN NOTE
· Chronically on 4 L nasal cannula  · Initially required BiPAP in the ER  · COPD exacerbation being treated with IV steroids and nebulizer treatments  · Respiratory protocol  · Patient back on baseline oxygen requirement of 4 L

## 2023-07-26 NOTE — TELEPHONE ENCOUNTER
She would like her lorazepam called in to Pharmacy so it's ready to be picked up tomorrow when she gets released from the Hospital.

## 2023-07-26 NOTE — DISCHARGE INSTR - AVS FIRST PAGE
Follow-up with PCP within 1 week for posthospitalization follow-up  Continue close outpatient follow-up with pulmonology  Continue prednisone taper as outlined  Continue to monitor your blood sugars closely especially while on steroid. Should your sugars persistently be > 200 please contact your endocrinologist.      Return to the emergency department for further evaluation with any chest pain/palpitations, worsening shortness of breath, nausea/vomiting, abdominal pain, fever/chills.

## 2023-07-26 NOTE — DISCHARGE SUMMARY
[address] [facility]  Discharge- Tobi Chacko 1960, 57 y.o. female MRN: [id number]  Unit/Bed#: -01 Encounter: [id number]  Primary Care Provider: Fernando Carvalho MD   Date and time admitted to hospital: 7/22/2023  8:48 PM    * Asthma with COPD with exacerbation [facility])  Assessment & Plan  · Presents from home due to worsening SOB, initially placed on BiPAP. Able to be weaned down to 6 L nasal cannula after breathing treatment, steroids and IV Lasix. · Chronically on 4 L nasal cannula  · Recent admission from 6/2 through 6/6 with COPD exacerbation. DC on prednisone taper. Patient reports compliance and had felt better up until about 2 weeks ago. · Called pulmonology on 7/14 due to SOB. Started on Z-Carlos and prednisone taper  · Finished both of these. Reports improvement initially but sob worsened after completion of steroids  · In the ER received IV Solu-Medrol, IV azithromycin and breathing treatments  · Patient transitioned to prednisone taper  · Pulmonology following for further arrangement of [facility]  · Close outpatient follow-up with pulmonology  · Stable for discharge home    Acute on chronic respiratory failure with hypoxia ([address])  Assessment & Plan  · Chronically on 4 L nasal cannula  · Initially required BiPAP in the ER  · COPD exacerbation being treated with IV steroids and nebulizer treatments  · Respiratory protocol  · Patient back on baseline oxygen requirement of 4 L    Class 3 severe obesity in adult [facility])  Assessment & Plan  Body mass index is 51.65 kg/m². · Encourage lifestyle changes   · Consult nutrition     Chronic diastolic congestive heart failure (HCC)  Assessment & Plan  Wt Readings from Last 3 Encounters:   07/26/23 132 kg (291 lb 9.6 oz)   06/08/23 129 kg (284 lb)   06/06/23 129 kg (284 lb 9.6 oz)     · Does not appear fluid overloaded on exam. No lung crackles noted. · BNP slightly elevated at 112  · Received IV Lasix 40 mg in the ER.   Hold further IV doses at this time  · Continue home torsemide  · Monitor I/O and daily weights    Paroxysmal atrial fibrillation (HCC)  Assessment & Plan  · Home regimen: Eliquis 5 mg twice daily, metoprolol succinate 100 mg daily    Type 2 diabetes mellitus with stage 2 chronic kidney disease, with long-term current use of insulin [facility])  Assessment & Plan  Lab Results   Component Value Date    HGBA1C 9.9 (H) 05/11/2023       Recent Labs     07/25/23  1621 07/25/23  2109 07/26/23  0736 07/26/23  1047   POCGLU 252* 276* 157* 205*       Blood Sugar Average: Last 72 hrs:  · (P) 322   · Home regimen: Glargine 30 units daily at bedtime, glulisine 24 units with breakfast, 12 units with lunch and dinner  · Significant steroid-induced hyperglycemia on admit  · Increase home regimen to glargine 40 units at bedtime, glusiline 24 units with breakfast and 15 units lunch/dinner. Discussed close monitoring of sugars for any hyper/hypoglycemia    Obstructive sleep apnea  Assessment & Plan  · Patient underwent overnight pulse ox study and morning ABG  · Now qualified for BiPAP qhs which has been arranged by case management    Medical Problems     Resolved Problems  Date Reviewed: 7/26/2023   None       Discharging Physician / Practitioner: Ramses Ruiz PA-C  PCP: Gisela Chapman MD  Admission Date:   Admission Orders (From admission, onward)     Ordered        07/22/23 2340  INPATIENT ADMISSION  Once                      Discharge Date: 07/26/23    Consultations During Hospital Stay:  · Pulmonology  · PT/OT    Procedures Performed:   · None    Significant Findings / Test Results:   · CXR: No acute cardiopulmonary disease  · COVID/influenza/RSV negative  ·     Incidental Findings:   · None    Test Results Pending at Discharge (will require follow up):    · None     Outpatient Tests Requested:  · None    Complications: None    Reason for Admission: COPD exacerbation    Hospital Course:   Randall Vela is a 57 y.o. female patient who originally presented to the hospital on 7/22/2023 due to worsening shortness of breath. Past medical history significant for chronic respiratory failure on 4 L nasal cannula, COPD, obesity, type 2 diabetes, WILMA, CHF. Patient presented to the emergency department with worsening shortness of breath initially requiring BiPAP. It was transitioned to 6 L nasal cannula and was able to be weaned back down to her baseline of 4 L. Patient was started on nebulizer treatments, IV Solu-Medrol and did receive additional dose of IV diuretic initially on presentation. Patient continued to clinically improve and IV steroids were eventually transitioned to a prednisone taper. Endocrinology had been consulted due to significant hyperglycemia related to steroids. Patient underwent overnight pulse ox study and ABG in the morning and qualified for BiPAP at bedtime. This was arranged by case management on discharge. PT/OT evaluated patient and patient does not require any needs on discharge. On day of discharge patient was hemodynamically stable, on baseline oxygen requirement and verbalized understanding for requested outpatient follow-up. Please see above list of diagnoses and related plan for additional information. Condition at Discharge: stable    Discharge Day Visit / Exam:   Subjective: Feeling much better. No acute complaints. Vitals: Blood Pressure: 109/58 (07/26/23 0816)  Pulse: 81 (07/26/23 0816)  Temperature: (!) 97 °F (36.1 °C) (07/25/23 0718)  Temp Source: Temporal (07/25/23 0718)  Respirations: 12 (07/25/23 0718)  Height: 5' 3" (160 cm) (07/22/23 2040)  Weight - Scale: 132 kg (291 lb 9.6 oz) (07/26/23 0337)  SpO2: 95 % (07/26/23 1431)  Exam:   Physical Exam  Vitals and nursing note reviewed. Constitutional:       Appearance: Normal appearance. Interventions: Nasal cannula in place. Comments: No acute distress   HENT:      Head: Normocephalic. Eyes:      General: No scleral icterus. Extraocular Movements: Extraocular movements intact. Conjunctiva/sclera: Conjunctivae normal.   Cardiovascular:      Rate and Rhythm: Normal rate and regular rhythm. Heart sounds: S1 normal and S2 normal.   Pulmonary:      Effort: Pulmonary effort is normal.      Breath sounds: No wheezing, rhonchi or rales. Comments: Diminished breath sounds bilaterally  Abdominal:      General: Bowel sounds are normal.      Palpations: Abdomen is soft. Tenderness: There is no abdominal tenderness. There is no guarding or rebound. Musculoskeletal:         General: No swelling, tenderness or deformity. Cervical back: Normal range of motion. Comments: Able to move upper/lower extremities bilaterally, trace pedal edema   Skin:     General: Skin is warm and dry. Neurological:      Mental Status: She is alert and oriented to person, place, and time. Psychiatric:         Mood and Affect: Mood normal.         Speech: Speech normal.         Behavior: Behavior normal.          Discussion with Family: Patient declined call to . Discharge instructions/Information to patient and family:   See after visit summary for information provided to patient and family. Provisions for Follow-Up Care:  See after visit summary for information related to follow-up care and any pertinent home health orders. Disposition:   Home    Planned Readmission: None     Discharge Statement:  I spent 65 minutes discharging the patient. This time was spent on the day of discharge. I had direct contact with the patient on the day of discharge. Greater than 50% of the total time was spent examining patient, answering all patient questions, arranging and discussing plan of care with patient as well as directly providing post-discharge instructions. Additional time then spent on discharge activities.     Discharge Medications:  See after visit summary for reconciled discharge medications provided to patient and/or family.       **Please Note: This note may have been constructed using a voice recognition system**

## 2023-07-26 NOTE — ASSESSMENT & PLAN NOTE
· Presents from home due to worsening SOB, initially placed on BiPAP. Able to be weaned down to 6 L nasal cannula after breathing treatment, steroids and IV Lasix. · Chronically on 4 L nasal cannula  · Recent admission from 6/2 through 6/6 with COPD exacerbation. DC on prednisone taper. Patient reports compliance and had felt better up until about 2 weeks ago. · Called pulmonology on 7/14 due to SOB. Started on Z-Cralos and prednisone taper  · Finished both of these.   Reports improvement initially but sob worsened after completion of steroids  · In the ER received IV Solu-Medrol, IV azithromycin and breathing treatments  · Patient transitioned to prednisone taper  · Pulmonology following for further arrangement of [facility]  · Close outpatient follow-up with pulmonology  · Stable for discharge home

## 2023-07-26 NOTE — PROGRESS NOTES
Progress Note - Pulmonary   Mattie Harvey 57 y.o. female MRN: [id number]  Unit/Bed#: -01 Encounter: [id number]    Assessment & Plan:  Acute on chronic hypoxic and hypercapnic respiratory failure  Asthma/COPD overlap with AE  CHF  WILMA  Morbid obesity    · Maintain pulse ox > 88%. Currently saturating well on baseline 4 L   · Patient compliant with home CPAP however given her overnight oxygen desaturations and persistent hypercapnia on a.m. ABG would greatly benefit from BiPAP. Recommend auto BiPAP with max IPAP of 25 EPAP 10 and pressure support of 6.  · Continue nebulizer treatments  · Steve Brian needs to be resumed as outpatient - will check with the office. Message sent to coordinator  · Continue prednisone taper as outlined  · Diuretics per primary  · Discussed with primary team    Subjective:   Patient says that her breathing seems to be better. She needs to work on getting more mobile. She does not have any new complaints. She is not really coughing anymore. Objective:     Vitals: Blood pressure 109/58, pulse 81, temperature (!) 97 °F (36.1 °C), temperature source Temporal, resp. rate 12, height 5' 3" (1.6 m), weight 132 kg (291 lb 9.6 oz), SpO2 94 %, not currently breastfeeding. ,Body mass index is 51.65 kg/m². Intake/Output Summary (Last 24 hours) at 7/26/2023 2696  Last data filed at 7/26/2023 4503  Gross per 24 hour   Intake 1320 ml   Output 3900 ml   Net -2580 ml       Invasive Devices     Peripheral Intravenous Line  Duration           Peripheral IV 07/22/23 Right Antecubital 3 days                Physical Exam:   General appearance: Alert and oriented, in no acute distress  Head: Normocephalic, without obvious abnormality, atraumatic  Eyes: EOMI. No discharge bilaterally. No scleral icterus.    Neck: Supple, symmetrical, trachea midline  Lungs: Decreased breath sounds  Heart: Regular rate and rhythm, S1, S2 normal, no murmur  Abdomen:  No appreciable distension or tenderness  Extremities: no edema or tenderness  Skin: Warm and dry  Neurologic: No acute focal deficits are noted      Labs: I have personally reviewed pertinent lab results. Imaging and other studies: I have personally reviewed pertinent reports.

## 2023-07-26 NOTE — PLAN OF CARE
Problem: PAIN - ADULT  Goal: Verbalizes/displays adequate comfort level or baseline comfort level  Description: Interventions:  - Encourage patient to monitor pain and request assistance  - Assess pain using appropriate pain scale  - Administer analgesics based on type and severity of pain and evaluate response  - Implement non-pharmacological measures as appropriate and evaluate response  - Consider cultural and social influences on pain and pain management  - Notify physician/advanced practitioner if interventions unsuccessful or patient reports new pain  Outcome: Progressing     Problem: INFECTION - ADULT  Goal: Absence or prevention of progression during hospitalization  Description: INTERVENTIONS:  - Assess and monitor for signs and symptoms of infection  - Monitor lab/diagnostic results  - Monitor all insertion sites, i.e. indwelling lines, tubes, and drains  - Monitor endotracheal if appropriate and nasal secretions for changes in amount and color  - [place] appropriate cooling/warming therapies per order  - Administer medications as ordered  - Instruct and encourage patient and family to use good hand hygiene technique  - Identify and instruct in appropriate isolation precautions for identified infection/condition  Outcome: Progressing     Problem: SAFETY ADULT  Goal: Maintain or return to baseline ADL function  Description: INTERVENTIONS:  -  Assess patient's ability to carry out ADLs; assess patient's baseline for ADL function and identify physical deficits which impact ability to perform ADLs (bathing, care of mouth/teeth, toileting, grooming, dressing, etc.)  - Assess/evaluate cause of self-care deficits   - Assess range of motion  - Assess patient's mobility; develop plan if impaired  - Assess patient's need for assistive devices and provide as appropriate  - Encourage maximum independence but intervene and supervise when necessary  - Involve family in performance of ADLs  - Assess for home care needs following discharge   - Consider OT consult to assist with ADL evaluation and planning for discharge  - Provide patient education as appropriate  Outcome: Progressing  Goal: Maintains/Returns to pre admission functional level  Description: INTERVENTIONS:  - Perform BMAT or MOVE assessment daily.   - Set and communicate daily mobility goal to care team and patient/family/caregiver. - Collaborate with rehabilitation services on mobility goals if consulted  - Perform Range of Motion 3 times a day. - Reposition patient every 2 hours.   - Dangle patient 3 times a day  - Stand patient 3 times a day  - Ambulate patient 3 times a day  - Out of bed to chair 3 times a day for meals  - Out of bed for toileting  - Record patient progress and toleration of activity level   Outcome: Progressing  Goal: Patient will remain free of falls  Description: INTERVENTIONS:  - Educate patient/family on patient safety including physical limitations  - Instruct patient to call for assistance with activity   - Consult OT/PT to assist with strengthening/mobility   - Keep Call bell within reach  - Keep bed low and locked with side rails adjusted as appropriate  - Keep care items and personal belongings within reach  - Initiate and maintain comfort rounds  - Make Fall Risk Sign visible to staff  - Offer Toileting every 2 Hours, in advance of need  - Initiate/Maintain bed/chair alarm  - Obtain necessary fall risk management equipment: non-slip socks  - Apply yellow socks and bracelet for high fall risk patients  - Consider moving patient to room near nurses station  Outcome: Progressing     Problem: DISCHARGE PLANNING  Goal: Discharge to home or other facility with appropriate resources  Description: INTERVENTIONS:  - Identify barriers to discharge w/patient and caregiver  - Arrange for needed discharge resources and transportation as appropriate  - Identify discharge learning needs (meds, wound care, etc.)  - Arrange for interpretive services to assist at discharge as needed  - Refer to Case Management Department for coordinating discharge planning if the patient needs post-hospital services based on physician/advanced practitioner order or complex needs related to functional status, cognitive ability, or social support system  Outcome: Progressing     Problem: Knowledge Deficit  Goal: Patient/family/caregiver demonstrates understanding of disease process, treatment plan, medications, and discharge instructions  Description: Complete learning assessment and assess knowledge base.   Interventions:  - Provide teaching at level of understanding  - Provide teaching via preferred learning methods  Outcome: Progressing     Problem: RESPIRATORY - ADULT  Goal: Achieves optimal ventilation and oxygenation  Description: INTERVENTIONS:  - Assess for changes in respiratory status  - Assess for changes in mentation and behavior  - Position to facilitate oxygenation and minimize respiratory effort  - Oxygen administered by appropriate delivery if ordered  - Initiate smoking cessation education as indicated  - Encourage broncho-pulmonary hygiene including cough, deep breathe, Incentive Spirometry  - Assess the need for suctioning and aspirate as needed  - Assess and instruct to report SOB or any respiratory difficulty  - Respiratory Therapy support as indicated  Outcome: Progressing

## 2023-07-26 NOTE — ASSESSMENT & PLAN NOTE
Wt Readings from Last 3 Encounters:   07/26/23 132 kg (291 lb 9.6 oz)   06/08/23 129 kg (284 lb)   06/06/23 129 kg (284 lb 9.6 oz)     · Does not appear fluid overloaded on exam. No lung crackles noted. · BNP slightly elevated at 112  · Received IV Lasix 40 mg in the ER.   Hold further IV doses at this time  · Continue home torsemide  · Monitor I/O and daily weights

## 2023-07-26 NOTE — PLAN OF CARE
Problem: MOBILITY - ADULT  Goal: Maintain or return to baseline ADL function  Description: INTERVENTIONS:  -  Assess patient's ability to carry out ADLs; assess patient's baseline for ADL function and identify physical deficits which impact ability to perform ADLs (bathing, care of mouth/teeth, toileting, grooming, dressing, etc.)  - Assess/evaluate cause of self-care deficits   - Assess range of motion  - Assess patient's mobility; develop plan if impaired  - Assess patient's need for assistive devices and provide as appropriate  - Encourage maximum independence but intervene and supervise when necessary  - Involve family in performance of ADLs  - Assess for home care needs following discharge   - Consider OT consult to assist with ADL evaluation and planning for discharge  - Provide patient education as appropriate  Outcome: Progressing  Goal: Maintains/Returns to pre admission functional level  Description: INTERVENTIONS:  - Perform BMAT or MOVE assessment daily.   - Set and communicate daily mobility goal to care team and patient/family/caregiver. - Collaborate with rehabilitation services on mobility goals if consulted  - Perform Range of Motion 3 times a day. - Reposition patient every 2 hours.   - Dangle patient 3 times a day  - Stand patient 3 times a day  - Ambulate patient 3 times a day  - Out of bed to chair 3 times a day   - Out of bed for meals 3 times a day  - Out of bed for toileting  - Record patient progress and toleration of activity level   Outcome: Progressing     Problem: PAIN - ADULT  Goal: Verbalizes/displays adequate comfort level or baseline comfort level  Description: Interventions:  - Encourage patient to monitor pain and request assistance  - Assess pain using appropriate pain scale  - Administer analgesics based on type and severity of pain and evaluate response  - Implement non-pharmacological measures as appropriate and evaluate response  - Consider cultural and social influences on pain and pain management  - Notify physician/advanced practitioner if interventions unsuccessful or patient reports new pain  Outcome: Progressing     Problem: INFECTION - ADULT  Goal: Absence or prevention of progression during hospitalization  Description: INTERVENTIONS:  - Assess and monitor for signs and symptoms of infection  - Monitor lab/diagnostic results  - Monitor all insertion sites, i.e. indwelling lines, tubes, and drains  - Monitor endotracheal if appropriate and nasal secretions for changes in amount and color  - [place] appropriate cooling/warming therapies per order  - Administer medications as ordered  - Instruct and encourage patient and family to use good hand hygiene technique  - Identify and instruct in appropriate isolation precautions for identified infection/condition  Outcome: Progressing  Goal: Absence of fever/infection during neutropenic period  Description: INTERVENTIONS:  - Monitor WBC    Outcome: Progressing     Problem: SAFETY ADULT  Goal: Maintain or return to baseline ADL function  Description: INTERVENTIONS:  -  Assess patient's ability to carry out ADLs; assess patient's baseline for ADL function and identify physical deficits which impact ability to perform ADLs (bathing, care of mouth/teeth, toileting, grooming, dressing, etc.)  - Assess/evaluate cause of self-care deficits   - Assess range of motion  - Assess patient's mobility; develop plan if impaired  - Assess patient's need for assistive devices and provide as appropriate  - Encourage maximum independence but intervene and supervise when necessary  - Involve family in performance of ADLs  - Assess for home care needs following discharge   - Consider OT consult to assist with ADL evaluation and planning for discharge  - Provide patient education as appropriate  Outcome: Progressing  Goal: Maintains/Returns to pre admission functional level  Description: INTERVENTIONS:  - Perform BMAT or MOVE assessment daily.   - Set and communicate daily mobility goal to care team and patient/family/caregiver. - Collaborate with rehabilitation services on mobility goals if consulted  - Perform Range of Motion 3 times a day. - Reposition patient every 2 hours.   - Dangle patient 3 times a day  - Stand patient 3 times a day  - Ambulate patient 3 times a day  - Out of bed to chair 3 times a day   - Out of bed for meals 3 times a day  - Out of bed for toileting  - Record patient progress and toleration of activity level   Outcome: Progressing  Goal: Patient will remain free of falls  Description: INTERVENTIONS:  - Educate patient/family on patient safety including physical limitations  - Instruct patient to call for assistance with activity   - Consult OT/PT to assist with strengthening/mobility   - Keep Call bell within reach  - Keep bed low and locked with side rails adjusted as appropriate  - Keep care items and personal belongings within reach  - Initiate and maintain comfort rounds  - Make Fall Risk Sign visible to staff  - Offer Toileting every 2 Hours, in advance of need  - Initiate/Maintain bed/ chair alarm  - Obtain necessary fall risk management equipment: n/a  - Apply yellow socks and bracelet for high fall risk patients  - Consider moving patient to room near nurses station  Outcome: Progressing     Problem: DISCHARGE PLANNING  Goal: Discharge to home or other facility with appropriate resources  Description: INTERVENTIONS:  - Identify barriers to discharge w/patient and caregiver  - Arrange for needed discharge resources and transportation as appropriate  - Identify discharge learning needs (meds, wound care, etc.)  - Arrange for interpretive services to assist at discharge as needed  - Refer to Case Management Department for coordinating discharge planning if the patient needs post-hospital services based on physician/advanced practitioner order or complex needs related to functional status, cognitive ability, or social support system  Outcome: Progressing     Problem: Knowledge Deficit  Goal: Patient/family/caregiver demonstrates understanding of disease process, treatment plan, medications, and discharge instructions  Description: Complete learning assessment and assess knowledge base.   Interventions:  - Provide teaching at level of understanding  - Provide teaching via preferred learning methods  Outcome: Progressing     Problem: RESPIRATORY - ADULT  Goal: Achieves optimal ventilation and oxygenation  Description: INTERVENTIONS:  - Assess for changes in respiratory status  - Assess for changes in mentation and behavior  - Position to facilitate oxygenation and minimize respiratory effort  - Oxygen administered by appropriate delivery if ordered  - Initiate smoking cessation education as indicated  - Encourage broncho-pulmonary hygiene including cough, deep breathe, Incentive Spirometry  - Assess the need for suctioning and aspirate as needed  - Assess and instruct to report SOB or any respiratory difficulty  - Respiratory Therapy support as indicated  Outcome: Progressing     Problem: Potential for Falls  Goal: Patient will remain free of falls  Description: INTERVENTIONS:  - Educate patient/family on patient safety including physical limitations  - Instruct patient to call for assistance with activity   - Consult OT/PT to assist with strengthening/mobility   - Keep Call bell within reach  - Keep bed low and locked with side rails adjusted as appropriate  - Keep care items and personal belongings within reach  - Initiate and maintain comfort rounds  - Make Fall Risk Sign visible to staff  - Offer Toileting every 2 Hours, in advance of need  - Initiate/Maintain bed/ chair alarm  - Obtain necessary fall risk management equipment: n/a  - Apply yellow socks and bracelet for high fall risk patients  - Consider moving patient to room near nurses station  Outcome: Progressing

## 2023-07-27 ENCOUNTER — PATIENT OUTREACH (OUTPATIENT)
Dept: FAMILY MEDICINE CLINIC | Facility: HOSPITAL | Age: 63
End: 2023-07-27

## 2023-07-27 ENCOUNTER — TELEPHONE (OUTPATIENT)
Dept: FAMILY MEDICINE CLINIC | Facility: HOSPITAL | Age: 63
End: 2023-07-27

## 2023-07-27 ENCOUNTER — TRANSITIONAL CARE MANAGEMENT (OUTPATIENT)
Dept: FAMILY MEDICINE CLINIC | Facility: HOSPITAL | Age: 63
End: 2023-07-27

## 2023-07-27 RX ORDER — TRAZODONE HYDROCHLORIDE 150 MG/1
150 TABLET ORAL
Qty: 90 TABLET | Refills: 0 | Status: SHIPPED | OUTPATIENT
Start: 2023-07-27

## 2023-07-27 RX ORDER — TORSEMIDE 20 MG/1
60 TABLET ORAL 2 TIMES DAILY
Qty: 540 TABLET | Refills: 1 | Status: SHIPPED | OUTPATIENT
Start: 2023-07-27

## 2023-07-27 RX ORDER — ESCITALOPRAM OXALATE 20 MG/1
TABLET ORAL
Qty: 90 TABLET | Refills: 0 | Status: SHIPPED | OUTPATIENT
Start: 2023-07-27

## 2023-07-27 RX ORDER — METFORMIN HYDROCHLORIDE 500 MG/1
TABLET, EXTENDED RELEASE ORAL
Qty: 360 TABLET | Refills: 1 | Status: SHIPPED | OUTPATIENT
Start: 2023-07-27

## 2023-07-27 NOTE — TELEPHONE ENCOUNTER
Please call. Rx refilled but is due for apt. May be due for tcm. But due for apt either way. 40 minute apt please.

## 2023-07-27 NOTE — UTILIZATION REVIEW
NOTIFICATION OF ADMISSION DISCHARGE   This is a Notification of Discharge from [facility]. Please be advised that this patient has been discharge from our facility. Below you will find the admission and discharge date and time including the patient’s disposition. UTILIZATION REVIEW CONTACT:  Lenard Serna  Utilization   Network Utilization Review Department  Phone: [phone] carefully listen to the prompts. All voicemails are confidential.  Email: [email or web address]     ADMISSION INFORMATION  PRESENTATION DATE: 7/22/2023  8:48 PM  OBERVATION ADMISSION DATE:   INPATIENT ADMISSION DATE: 7/22/23 11:40 PM   DISCHARGE DATE: 7/26/2023  8:50 PM   DISPOSITION:Home/Self Care    IMPORTANT INFORMATION:  Send all requests for admission clinical reviews, approved or denied determinations and any other requests to dedicated fax number below belonging to the campus where the patient is receiving treatment.  List of dedicated fax numbers:  [place] DENIALS (Administrative/Medical Necessity) [phone] [address] [facility] (Maternity/NICU/Pediatrics) [phone]   [facility] [phone]   [facility] [phone] [address] [facility] [phone]   [address] [place] [address] [phone]   [facility] [phone]   [address] [place] [address] [address] [facility] [phone]   [address] [place] [address] [phone] [address] [facility] [phone]   [address] [facility] [address] [facility] [phone]

## 2023-07-27 NOTE — PROGRESS NOTES
Outpatient Care Management Note:    ADT alert received that patient was discharged from the hospital. She was ordered a bipap machine and prednisone taper. Care manager called Ivy. She states that her breathing is better. She got her bipap machine. She will be setting it up today. She was too tired to try and figure it out last night. Ivy states that she has the prednisone taper medication. She denies any questions about it. We reviewed that she needs to use her nebulizer medications 2 times per day. Ivy verbalized understanding that this will help to prevent re exacerbations. Ivy does not know where her pulse ox is. I strongly encouraged her to look for it as it is helpful to know where her oxygen levels are running. Ivy is using her oxygen at 4 L. Ivy states that her blood sugar this morning was 244. She increased her apidra insulin to 24 units with breakfast and 15 units with lunch and dinner. She is taking 40 units of glargine at bedtime. She will monitor her sugars closely and will call endocrine if her sugars are consistently above 200. Ivy has a dexcom. CM reminded Ivy that she needs to reschedule her cardiology appt. She did not check her weight this morning, but states she can start. Per hospital discharge summary, they did not think she was fluid overloaded. Ivy has my contact information and will call with any questions.

## 2023-08-03 ENCOUNTER — PATIENT OUTREACH (OUTPATIENT)
Dept: FAMILY MEDICINE CLINIC | Facility: HOSPITAL | Age: 63
End: 2023-08-03

## 2023-08-03 DIAGNOSIS — Z71.89 COMPLEX CARE COORDINATION: Primary | ICD-10-CM

## 2023-08-03 NOTE — PROGRESS NOTES
Outpatient Care Management Note:    Care manager called Ivy. She states that her breathing is a little better. She still gets short of breath with exertion. She is using her oxygen at 4l and her nebulizer treatments. She checked her pulse ox while on the phone and it was 95%. Ivy is using her bipap and states that she is working to get used to it. Ivy mentioned that she is depressed. The last hospitalization and her breathing have been challenging. She stated that her aunt is worried about her. Ivy denied any thoughts of harming herself. She feels that she would benefit from a counselor. CM will refer her to our  for assistance. Pauline Posey feels she would like virtual counseling. CM reminded Ivy that she still needs to schedule with cardiology. She agreed to call. Ivy is not checking daily weights despite CM explaining how and why we check weights. Ivy is scheduled to see endocrine on 8/25. I reviewed that she needs to complete her diabetic lab work. Ivy noted that her sugars have been high, because she eats pretzels. She states that they have been around 400. CM offered to review sugar readings and send them to endocrine to evaluate, but Ivy declined. She states that she does not have any recorded. CM asked her to keep a log. We discussed limiting her pretzel intake, taking a small portion out of the bag, and putting the bag away. CM gave Ivy my new contact information. She requested CM call her back next week to follow up. [employer] message received from Dr Colt Maddox:   Hi dear, if patient is using a reader for her dexcom we cannot upload the data without the reader in the office. If she uses her phone, we can try with the cloud. I will ask my staff to check her up on our system if we have any latest dexcom upload. However patient really needs to make her choices better and eat better if she would like to improve her BG.  We will certainly work on more education on our part during the follow up visit.

## 2023-08-07 DIAGNOSIS — I48.91 ATRIAL FIBRILLATION, UNSPECIFIED TYPE (HCC): ICD-10-CM

## 2023-08-07 NOTE — TELEPHONE ENCOUNTER
Hi, my name is Elisabet Dodson. [birth year]. I need Eliquis. I only have enough for a week less than a week. My phone number is [phone]. Thank you. You received a voice mail from [phone].

## 2023-08-10 ENCOUNTER — PATIENT OUTREACH (OUTPATIENT)
Dept: FAMILY MEDICINE CLINIC | Facility: HOSPITAL | Age: 63
End: 2023-08-10
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
## 2023-08-11 ENCOUNTER — PATIENT OUTREACH (OUTPATIENT)
Dept: FAMILY MEDICINE CLINIC | Facility: HOSPITAL | Age: 63
End: 2023-08-11

## 2023-08-11 ENCOUNTER — HOSPITAL ENCOUNTER (INPATIENT)
Facility: HOSPITAL | Age: 63
LOS: 11 days | Discharge: HOME HEALTH | DRG: 133 | End: 2023-08-22
Attending: EMERGENCY MEDICINE | Admitting: INTERNAL MEDICINE
Payer: COMMERCIAL

## 2023-08-11 ENCOUNTER — APPOINTMENT (EMERGENCY)
Dept: RADIOLOGY | Facility: HOSPITAL | Age: 63
DRG: 133 | End: 2023-08-11
Payer: COMMERCIAL

## 2023-08-11 DIAGNOSIS — J44.1 COPD EXACERBATION (HCC): ICD-10-CM

## 2023-08-11 DIAGNOSIS — J96.21 ACUTE ON CHRONIC RESPIRATORY FAILURE WITH HYPOXIA (HCC): Primary | ICD-10-CM

## 2023-08-11 DIAGNOSIS — I50.32 CHRONIC DIASTOLIC CONGESTIVE HEART FAILURE (HCC): ICD-10-CM

## 2023-08-11 LAB
2HR DELTA HS TROPONIN: -1 NG/L
4HR DELTA HS TROPONIN: -3 NG/L
ALBUMIN SERPL BCP-MCNC: 3.8 G/DL (ref 3.5–5)
ALP SERPL-CCNC: 119 U/L (ref 34–104)
ALT SERPL W P-5'-P-CCNC: 18 U/L (ref 7–52)
ANION GAP SERPL CALCULATED.3IONS-SCNC: 4 MMOL/L
APTT PPP: 31 SECONDS (ref 23–37)
ARTERIAL PATENCY WRIST A: YES
AST SERPL W P-5'-P-CCNC: 28 U/L (ref 13–39)
ATRIAL RATE: 79 BPM
BASE EXCESS BLDA CALC-SCNC: 13 MMOL/L (ref -2–3)
BASE EXCESS BLDA CALC-SCNC: 6.4 MMOL/L
BASOPHILS # BLD AUTO: 0.09 THOUSANDS/ÂΜL (ref 0–0.1)
BASOPHILS NFR BLD AUTO: 1 % (ref 0–1)
BILIRUB SERPL-MCNC: 0.34 MG/DL (ref 0.2–1)
BNP SERPL-MCNC: 73 PG/ML (ref 0–100)
BUN SERPL-MCNC: 11 MG/DL (ref 5–25)
CA-I BLD-SCNC: 1.11 MMOL/L (ref 1.12–1.32)
CALCIUM SERPL-MCNC: 8 MG/DL (ref 8.4–10.2)
CARDIAC TROPONIN I PNL SERPL HS: 4 NG/L
CARDIAC TROPONIN I PNL SERPL HS: 6 NG/L
CARDIAC TROPONIN I PNL SERPL HS: 7 NG/L
CHLORIDE SERPL-SCNC: 91 MMOL/L (ref 96–108)
CO2 SERPL-SCNC: 39 MMOL/L (ref 21–32)
CREAT SERPL-MCNC: 0.59 MG/DL (ref 0.6–1.3)
EOSINOPHIL # BLD AUTO: 0.28 THOUSAND/ÂΜL (ref 0–0.61)
EOSINOPHIL NFR BLD AUTO: 2 % (ref 0–6)
ERYTHROCYTE [DISTWIDTH] IN BLOOD BY AUTOMATED COUNT: 19.3 % (ref 11.6–15.1)
FLUAV RNA RESP QL NAA+PROBE: NEGATIVE
FLUBV RNA RESP QL NAA+PROBE: NEGATIVE
GFR SERPL CREATININE-BSD FRML MDRD: 97 ML/MIN/1.73SQ M
GLUCOSE SERPL-MCNC: 252 MG/DL (ref 65–140)
GLUCOSE SERPL-MCNC: 269 MG/DL (ref 65–140)
GLUCOSE SERPL-MCNC: 445 MG/DL (ref 65–140)
HCO3 BLDA-SCNC: 33.7 MMOL/L (ref 22–28)
HCO3 BLDA-SCNC: 42 MMOL/L (ref 24–30)
HCT VFR BLD AUTO: 33.6 % (ref 34.8–46.1)
HCT VFR BLD CALC: 32 % (ref 34.8–46.1)
HGB BLD-MCNC: 9.2 G/DL (ref 11.5–15.4)
HGB BLDA-MCNC: 10.9 G/DL (ref 11.5–15.4)
IMM GRANULOCYTES # BLD AUTO: 0.09 THOUSAND/UL (ref 0–0.2)
IMM GRANULOCYTES NFR BLD AUTO: 1 % (ref 0–2)
INR PPP: 1.14 (ref 0.84–1.19)
LACTATE SERPL-SCNC: 2.4 MMOL/L (ref 0.5–2)
LACTATE SERPL-SCNC: 2.6 MMOL/L (ref 0.5–2)
LACTATE SERPL-SCNC: 3 MMOL/L (ref 0.5–2)
LYMPHOCYTES # BLD AUTO: 1.74 THOUSANDS/ÂΜL (ref 0.6–4.47)
LYMPHOCYTES NFR BLD AUTO: 13 % (ref 14–44)
MCH RBC QN AUTO: 18.8 PG (ref 26.8–34.3)
MCHC RBC AUTO-ENTMCNC: 27.4 G/DL (ref 31.4–37.4)
MCV RBC AUTO: 69 FL (ref 82–98)
MONOCYTES # BLD AUTO: 0.8 THOUSAND/ÂΜL (ref 0.17–1.22)
MONOCYTES NFR BLD AUTO: 6 % (ref 4–12)
NASAL CANNULA: 6
NEUTROPHILS # BLD AUTO: 10.95 THOUSANDS/ÂΜL (ref 1.85–7.62)
NEUTS SEG NFR BLD AUTO: 77 % (ref 43–75)
NRBC BLD AUTO-RTO: 0 /100 WBCS
O2 CT BLDA-SCNC: 12.4 ML/DL (ref 16–23)
OXYHGB MFR BLDA: 88.5 % (ref 94–97)
P AXIS: 77 DEGREES
PCO2 BLD: 44 MMOL/L (ref 21–32)
PCO2 BLD: 78.4 MM HG (ref 42–50)
PCO2 BLDA: 64.8 MM HG (ref 36–44)
PH BLD: 7.34 [PH] (ref 7.3–7.4)
PH BLDA: 7.33 [PH] (ref 7.35–7.45)
PLATELET # BLD AUTO: 350 THOUSANDS/UL (ref 149–390)
PMV BLD AUTO: 11 FL (ref 8.9–12.7)
PO2 BLD: 53 MM HG (ref 35–45)
PO2 BLDA: 65.3 MM HG (ref 75–129)
POTASSIUM BLD-SCNC: 4.3 MMOL/L (ref 3.5–5.3)
POTASSIUM SERPL-SCNC: 4.9 MMOL/L (ref 3.5–5.3)
PR INTERVAL: 182 MS
PROCALCITONIN SERPL-MCNC: 0.05 NG/ML
PROT SERPL-MCNC: 6.5 G/DL (ref 6.4–8.4)
PROTHROMBIN TIME: 15.4 SECONDS (ref 11.6–14.5)
QRS AXIS: 66 DEGREES
QRSD INTERVAL: 78 MS
QT INTERVAL: 374 MS
QTC INTERVAL: 428 MS
RBC # BLD AUTO: 4.89 MILLION/UL (ref 3.81–5.12)
RSV RNA RESP QL NAA+PROBE: NEGATIVE
SAO2 % BLD FROM PO2: 83 % (ref 60–85)
SARS-COV-2 RNA RESP QL NAA+PROBE: NEGATIVE
SODIUM BLD-SCNC: 134 MMOL/L (ref 136–145)
SODIUM SERPL-SCNC: 134 MMOL/L (ref 135–147)
SPECIMEN SOURCE: ABNORMAL
SPECIMEN SOURCE: ABNORMAL
T WAVE AXIS: 79 DEGREES
VENTRICULAR RATE: 79 BPM
WBC # BLD AUTO: 13.95 THOUSAND/UL (ref 4.31–10.16)

## 2023-08-11 PROCEDURE — 94150 VITAL CAPACITY TEST: CPT

## 2023-08-11 PROCEDURE — 96367 TX/PROPH/DG ADDL SEQ IV INF: CPT

## 2023-08-11 PROCEDURE — 83605 ASSAY OF LACTIC ACID: CPT | Performed by: EMERGENCY MEDICINE

## 2023-08-11 PROCEDURE — 84145 PROCALCITONIN (PCT): CPT | Performed by: EMERGENCY MEDICINE

## 2023-08-11 PROCEDURE — 82330 ASSAY OF CALCIUM: CPT

## 2023-08-11 PROCEDURE — 96375 TX/PRO/DX INJ NEW DRUG ADDON: CPT

## 2023-08-11 PROCEDURE — 0241U HB NFCT DS VIR RESP RNA 4 TRGT: CPT | Performed by: EMERGENCY MEDICINE

## 2023-08-11 PROCEDURE — 94640 AIRWAY INHALATION TREATMENT: CPT

## 2023-08-11 PROCEDURE — 85025 COMPLETE CBC W/AUTO DIFF WBC: CPT | Performed by: EMERGENCY MEDICINE

## 2023-08-11 PROCEDURE — 94760 N-INVAS EAR/PLS OXIMETRY 1: CPT

## 2023-08-11 PROCEDURE — 84132 ASSAY OF SERUM POTASSIUM: CPT

## 2023-08-11 PROCEDURE — 85610 PROTHROMBIN TIME: CPT | Performed by: EMERGENCY MEDICINE

## 2023-08-11 PROCEDURE — 82947 ASSAY GLUCOSE BLOOD QUANT: CPT

## 2023-08-11 PROCEDURE — 82948 REAGENT STRIP/BLOOD GLUCOSE: CPT

## 2023-08-11 PROCEDURE — 80053 COMPREHEN METABOLIC PANEL: CPT | Performed by: EMERGENCY MEDICINE

## 2023-08-11 PROCEDURE — 94002 VENT MGMT INPAT INIT DAY: CPT

## 2023-08-11 PROCEDURE — 83605 ASSAY OF LACTIC ACID: CPT

## 2023-08-11 PROCEDURE — 82803 BLOOD GASES ANY COMBINATION: CPT

## 2023-08-11 PROCEDURE — 94644 CONT INHLJ TX 1ST HOUR: CPT

## 2023-08-11 PROCEDURE — 71045 X-RAY EXAM CHEST 1 VIEW: CPT

## 2023-08-11 PROCEDURE — 84484 ASSAY OF TROPONIN QUANT: CPT

## 2023-08-11 PROCEDURE — 87040 BLOOD CULTURE FOR BACTERIA: CPT | Performed by: EMERGENCY MEDICINE

## 2023-08-11 PROCEDURE — 82805 BLOOD GASES W/O2 SATURATION: CPT | Performed by: EMERGENCY MEDICINE

## 2023-08-11 PROCEDURE — 83880 ASSAY OF NATRIURETIC PEPTIDE: CPT | Performed by: EMERGENCY MEDICINE

## 2023-08-11 PROCEDURE — 36600 WITHDRAWAL OF ARTERIAL BLOOD: CPT

## 2023-08-11 PROCEDURE — 85730 THROMBOPLASTIN TIME PARTIAL: CPT | Performed by: EMERGENCY MEDICINE

## 2023-08-11 PROCEDURE — 84295 ASSAY OF SERUM SODIUM: CPT

## 2023-08-11 PROCEDURE — 36415 COLL VENOUS BLD VENIPUNCTURE: CPT | Performed by: EMERGENCY MEDICINE

## 2023-08-11 PROCEDURE — 93010 ELECTROCARDIOGRAM REPORT: CPT | Performed by: INTERNAL MEDICINE

## 2023-08-11 PROCEDURE — 99285 EMERGENCY DEPT VISIT HI MDM: CPT

## 2023-08-11 PROCEDURE — 84484 ASSAY OF TROPONIN QUANT: CPT | Performed by: EMERGENCY MEDICINE

## 2023-08-11 PROCEDURE — 96365 THER/PROPH/DIAG IV INF INIT: CPT

## 2023-08-11 PROCEDURE — 93005 ELECTROCARDIOGRAM TRACING: CPT

## 2023-08-11 PROCEDURE — 85014 HEMATOCRIT: CPT

## 2023-08-11 RX ORDER — FERROUS SULFATE 325(65) MG
325 TABLET ORAL
Status: DISCONTINUED | OUTPATIENT
Start: 2023-08-12 | End: 2023-08-11

## 2023-08-11 RX ORDER — BUDESONIDE 0.5 MG/2ML
0.5 INHALANT ORAL
Status: DISCONTINUED | OUTPATIENT
Start: 2023-08-11 | End: 2023-08-22 | Stop reason: HOSPADM

## 2023-08-11 RX ORDER — SODIUM CHLORIDE FOR INHALATION 0.9 %
12 VIAL, NEBULIZER (ML) INHALATION ONCE
Status: COMPLETED | OUTPATIENT
Start: 2023-08-11 | End: 2023-08-11

## 2023-08-11 RX ORDER — METOPROLOL SUCCINATE 50 MG/1
100 TABLET, EXTENDED RELEASE ORAL DAILY
Status: DISCONTINUED | OUTPATIENT
Start: 2023-08-12 | End: 2023-08-22 | Stop reason: HOSPADM

## 2023-08-11 RX ORDER — INSULIN GLARGINE 100 [IU]/ML
40 INJECTION, SOLUTION SUBCUTANEOUS
Status: DISCONTINUED | OUTPATIENT
Start: 2023-08-11 | End: 2023-08-12

## 2023-08-11 RX ORDER — INSULIN LISPRO 100 [IU]/ML
4-20 INJECTION, SOLUTION INTRAVENOUS; SUBCUTANEOUS
Status: DISCONTINUED | OUTPATIENT
Start: 2023-08-12 | End: 2023-08-12

## 2023-08-11 RX ORDER — MONTELUKAST SODIUM 10 MG/1
10 TABLET ORAL
Status: DISCONTINUED | OUTPATIENT
Start: 2023-08-11 | End: 2023-08-22 | Stop reason: HOSPADM

## 2023-08-11 RX ORDER — TRAZODONE HYDROCHLORIDE 150 MG/1
150 TABLET ORAL
Status: DISCONTINUED | OUTPATIENT
Start: 2023-08-11 | End: 2023-08-22 | Stop reason: HOSPADM

## 2023-08-11 RX ORDER — INSULIN LISPRO 100 [IU]/ML
4-20 INJECTION, SOLUTION INTRAVENOUS; SUBCUTANEOUS
Status: DISCONTINUED | OUTPATIENT
Start: 2023-08-11 | End: 2023-08-12

## 2023-08-11 RX ORDER — FORMOTEROL FUMARATE 20 UG/2ML
20 SOLUTION RESPIRATORY (INHALATION)
Status: DISCONTINUED | OUTPATIENT
Start: 2023-08-11 | End: 2023-08-22 | Stop reason: HOSPADM

## 2023-08-11 RX ORDER — METHYLPREDNISOLONE SODIUM SUCCINATE 125 MG/2ML
125 INJECTION, POWDER, LYOPHILIZED, FOR SOLUTION INTRAMUSCULAR; INTRAVENOUS ONCE
Status: COMPLETED | OUTPATIENT
Start: 2023-08-11 | End: 2023-08-11

## 2023-08-11 RX ORDER — LORAZEPAM 1 MG/1
1 TABLET ORAL
Status: DISCONTINUED | OUTPATIENT
Start: 2023-08-11 | End: 2023-08-22 | Stop reason: HOSPADM

## 2023-08-11 RX ORDER — CEFTRIAXONE 1 G/50ML
1000 INJECTION, SOLUTION INTRAVENOUS ONCE
Status: COMPLETED | OUTPATIENT
Start: 2023-08-11 | End: 2023-08-11

## 2023-08-11 RX ORDER — ATORVASTATIN CALCIUM 40 MG/1
40 TABLET, FILM COATED ORAL DAILY
Status: DISCONTINUED | OUTPATIENT
Start: 2023-08-12 | End: 2023-08-22 | Stop reason: HOSPADM

## 2023-08-11 RX ORDER — LORATADINE 10 MG/1
10 TABLET ORAL DAILY
Status: DISCONTINUED | OUTPATIENT
Start: 2023-08-12 | End: 2023-08-22 | Stop reason: HOSPADM

## 2023-08-11 RX ORDER — IPRATROPIUM BROMIDE AND ALBUTEROL SULFATE 2.5; .5 MG/3ML; MG/3ML
3 SOLUTION RESPIRATORY (INHALATION) ONCE
Status: DISCONTINUED | OUTPATIENT
Start: 2023-08-11 | End: 2023-08-11

## 2023-08-11 RX ORDER — MAGNESIUM SULFATE HEPTAHYDRATE 40 MG/ML
2 INJECTION, SOLUTION INTRAVENOUS ONCE
Status: COMPLETED | OUTPATIENT
Start: 2023-08-11 | End: 2023-08-11

## 2023-08-11 RX ORDER — PANTOPRAZOLE SODIUM 40 MG/1
40 TABLET, DELAYED RELEASE ORAL
Status: DISCONTINUED | OUTPATIENT
Start: 2023-08-12 | End: 2023-08-22 | Stop reason: HOSPADM

## 2023-08-11 RX ORDER — LEVALBUTEROL INHALATION SOLUTION 1.25 MG/3ML
1.25 SOLUTION RESPIRATORY (INHALATION)
Status: DISCONTINUED | OUTPATIENT
Start: 2023-08-11 | End: 2023-08-22 | Stop reason: HOSPADM

## 2023-08-11 RX ORDER — ESCITALOPRAM OXALATE 20 MG/1
20 TABLET ORAL DAILY
Status: DISCONTINUED | OUTPATIENT
Start: 2023-08-12 | End: 2023-08-22 | Stop reason: HOSPADM

## 2023-08-11 RX ORDER — METHYLPREDNISOLONE SODIUM SUCCINATE 40 MG/ML
40 INJECTION, POWDER, LYOPHILIZED, FOR SOLUTION INTRAMUSCULAR; INTRAVENOUS EVERY 8 HOURS SCHEDULED
Status: DISCONTINUED | OUTPATIENT
Start: 2023-08-11 | End: 2023-08-13

## 2023-08-11 RX ORDER — FUROSEMIDE 10 MG/ML
60 INJECTION INTRAMUSCULAR; INTRAVENOUS ONCE
Status: COMPLETED | OUTPATIENT
Start: 2023-08-11 | End: 2023-08-11

## 2023-08-11 RX ORDER — TORSEMIDE 20 MG/1
60 TABLET ORAL 2 TIMES DAILY
Status: DISCONTINUED | OUTPATIENT
Start: 2023-08-11 | End: 2023-08-13

## 2023-08-11 RX ORDER — FLUTICASONE PROPIONATE 50 MCG
1 SPRAY, SUSPENSION (ML) NASAL 2 TIMES DAILY
Status: DISCONTINUED | OUTPATIENT
Start: 2023-08-11 | End: 2023-08-22 | Stop reason: HOSPADM

## 2023-08-11 RX ADMIN — LORAZEPAM 1 MG: 1 TABLET ORAL at 21:56

## 2023-08-11 RX ADMIN — FUROSEMIDE 60 MG: 10 INJECTION, SOLUTION INTRAMUSCULAR; INTRAVENOUS at 19:54

## 2023-08-11 RX ADMIN — APIXABAN 5 MG: 5 TABLET, FILM COATED ORAL at 21:57

## 2023-08-11 RX ADMIN — LEVALBUTEROL HYDROCHLORIDE 1.25 MG: 1.25 SOLUTION RESPIRATORY (INHALATION) at 20:47

## 2023-08-11 RX ADMIN — FORMOTEROL FUMARATE DIHYDRATE 20 MCG: 20 SOLUTION RESPIRATORY (INHALATION) at 20:47

## 2023-08-11 RX ADMIN — AZITHROMYCIN MONOHYDRATE 500 MG: 500 INJECTION, POWDER, LYOPHILIZED, FOR SOLUTION INTRAVENOUS at 18:35

## 2023-08-11 RX ADMIN — SODIUM CHLORIDE 250 ML: 0.9 INJECTION, SOLUTION INTRAVENOUS at 19:00

## 2023-08-11 RX ADMIN — METHYLPREDNISOLONE SODIUM SUCCINATE 125 MG: 125 INJECTION, POWDER, FOR SOLUTION INTRAMUSCULAR; INTRAVENOUS at 17:31

## 2023-08-11 RX ADMIN — MONTELUKAST 10 MG: 10 TABLET, FILM COATED ORAL at 21:57

## 2023-08-11 RX ADMIN — INSULIN GLARGINE 40 UNITS: 100 INJECTION, SOLUTION SUBCUTANEOUS at 22:20

## 2023-08-11 RX ADMIN — MAGNESIUM SULFATE HEPTAHYDRATE 2 G: 2 INJECTION, SOLUTION INTRAVENOUS at 17:31

## 2023-08-11 RX ADMIN — INSULIN LISPRO 20 UNITS: 100 INJECTION, SOLUTION INTRAVENOUS; SUBCUTANEOUS at 22:20

## 2023-08-11 RX ADMIN — BUDESONIDE 0.5 MG: 0.5 INHALANT ORAL at 20:47

## 2023-08-11 RX ADMIN — Medication 12 ML: at 17:06

## 2023-08-11 RX ADMIN — CEFTRIAXONE 1000 MG: 1 INJECTION, SOLUTION INTRAVENOUS at 17:45

## 2023-08-11 RX ADMIN — TRAZODONE HYDROCHLORIDE 150 MG: 150 TABLET ORAL at 21:56

## 2023-08-11 RX ADMIN — IPRATROPIUM BROMIDE 0.5 MG: 0.5 SOLUTION RESPIRATORY (INHALATION) at 20:47

## 2023-08-11 RX ADMIN — IPRATROPIUM BROMIDE 1 MG: 0.5 SOLUTION RESPIRATORY (INHALATION) at 17:06

## 2023-08-11 RX ADMIN — FLUTICASONE PROPIONATE 1 SPRAY: 50 SPRAY, METERED NASAL at 22:20

## 2023-08-11 RX ADMIN — ALBUTEROL SULFATE 10 MG: 2.5 SOLUTION RESPIRATORY (INHALATION) at 17:06
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
## 2023-08-11 NOTE — ED PROVIDER NOTES
History  Chief Complaint   Patient presents with   • Shortness of Breath     Pt to er via ems from home with complaints of shortness of breath for the past few days. Had contact with family member who has covid. Associated with nausea, denies chest pain. Chronically wear 4L NC for COPD     Patient is a 75-year-old female who is brought in by ambulance for shortness of breath. Patient states that over the last few days she has been feeling progressively more short of breath associated with cough without sputum production. She states that her power went out and her son had to bring over a generator and he had to do it while he was COVID positive. Prior to Admission Medications   Prescriptions Last Dose Informant Patient Reported? Taking? Benralizumab 30 MG/ML SOAJ   No No   Sig: Inject 1 mL under the skin every 28 days   Blood Glucose Monitoring Suppl (NABILA CONTOUR NEXT MONITOR) w/Device KIT  Self Yes No   Sig: Use daily   [employer] Lancets Thin 26G MISC  Self No No   Sig: USE 3 (THREE) TIMES A DAY   Contour Next Test test strip   No No   Sig: USE TO TEST BLOOD SUGAR 3 TIMES A DAY   EPINEPHrine (EPIPEN) 0.3 mg/0.3 mL SOAJ  Self No No   Sig: Inject 0.3 mL (0.3 mg total) into a muscle once for 1 dose   Insulin Glargine Solostar (Lantus SoloStar) 100 UNIT/ML SOPN   No No   Sig: Inject 0.4 mL (40 Units total) under the skin daily at bedtime   Insulin Pen Needle (BD Pen Needle Love 2nd Gen) 32G X 4 MM MISC  Self No No   Sig: Use daily at bedtime Use 4 new needles daily .    LORazepam (ATIVAN) 0.5 mg tablet   No No   Sig: TAKE 2 TABLETS BY MOUTH AT BEDTIME AND 1 EVERY 6 HOURS AS NEEDED FOR ANXIETY   albuterol (2.5 mg/3 mL) 0.083 % nebulizer solution   No No   Sig: Take 3 mL (2.5 mg total) by nebulization every 6 (six) hours as needed for wheezing or shortness of breath   albuterol (PROVENTIL HFA,VENTOLIN HFA) 90 mcg/act inhaler   No No   Sig: Inhale 2 puffs every 4 (four) hours as needed for wheezing   apixaban (Eliquis) 5 mg   No No   Sig: Take 1 tablet (5 mg total) by mouth 2 (two) times a day   atorvastatin (LIPITOR) 40 mg tablet  Self No No   Sig: Take 1 tablet (40 mg total) by mouth daily   budesonide (PULMICORT) 0.5 mg/2 mL nebulizer solution   No No   Sig: Take 2 mL (0.5 mg total) by nebulization 2 (two) times a day Rinse mouth after use.   escitalopram (LEXAPRO) 20 mg tablet   No No   Sig: TAKE 1 TABLET BY MOUTH EVERY DAY   ferrous sulfate 220 (44 Fe) mg/5 mL solution  Self No No   Sig: TAKE 5 ML (220 MG TOTAL) BY MOUTH 2 (TWO) TIMES A DAY BEFORE MEALS   fluticasone (FLONASE) 50 mcg/act nasal spray  Self No No   Si spray into each nostril 2 (two) times a day   glycopyrrolate-formoterol (BEVESPI AEROSPHERE) 9-4.8 MCG/ACT inhaler   No No   Sig: Inhale 2 puffs 2 (two) times a day   insulin glulisine (Apidra SoloStar) 100 units/mL injection pen   No No   Si UNITS BREAKFAST 15 UNITS AT LUNCH AND DINNER   levalbuterol (XOPENEX) 1.25 mg/0.5 mL nebulizer solution  Self No No   Sig: Take 0.5 mL (1.25 mg total) by nebulization 2 (two) times a day   loratadine (CLARITIN) 10 mg tablet  Self Yes No   Sig: Take by mouth   metFORMIN (GLUCOPHAGE-XR) 500 mg 24 hr tablet   No No   Sig: TAKE 2 TABLETS BY MOUTH TWICE A DAY WITH FOOD   metoprolol succinate (TOPROL-XL) 100 mg 24 hr tablet  Self No No   Sig: Take 1 tablet (100 mg total) by mouth daily   montelukast (SINGULAIR) 10 mg tablet  Self No No   Sig: TAKE 1 TABLET BY MOUTH DAILY AT BEDTIME   naloxone (NARCAN) 4 mg/0.1 mL nasal spray  Self No No   Sig: Administer 1 spray into a nostril. If breathing does not return to normal or if breathing difficulty resumes after 2-3 minutes, give another dose in the other nostril using a new spray. omeprazole (PriLOSEC) 40 MG capsule   No No   Sig: TAKE 1 CAPSULE (40 MG TOTAL) BY MOUTH DAILY.    potassium chloride (Klor-Con M20) 20 mEq tablet  Self No No   Sig: Take 1 tablet (20 mEq total) by mouth 2 (two) times a day   semaglutide, 0.25 or 0.5 mg/dose, (Ozempic, 0.25 or 0.5 MG/DOSE,) 2 mg/3 mL injection pen  Self No No   Sig: Inject 0.75 mL (0.5 mg total) under the skin every 7 days   torsemide (DEMADEX) 20 mg tablet   No No   Sig: TAKE 3 TABLETS BY MOUTH 2 TIMES A DAY. traZODone (DESYREL) 150 mg tablet   No No   Sig: TAKE 1 TABLET BY MOUTH AT BEDTIME      Facility-Administered Medications: None       Past Medical History:   Diagnosis Date   • Acute blood loss anemia 2021   • Acute diverticulitis 2021   • Alcohol abuse 2020   • Anemia    • Atrial fibrillation [facility])    • Cervical radiculopathy    • CHF (congestive heart failure) ([facility])    • Chronic low back pain    • Chronic obstructive asthma ([address]) 2018   • Cigarette nicotine dependence without complication     Quit 12/10/2019.     • Community acquired pneumonia 2020   • Depression with anxiety 3/9/2014   • Diabetes mellitus with hyperglycemia ([address])    • Diverticulitis 2021   • Elevated liver enzymes    • GERD (gastroesophageal reflux disease)    • Hypersomnolence 10/28/2020   • Hypokalemia 2018   • Paresthesia of upper extremity    • Plantar fasciitis    • Restless legs syndrome 2014   • Sexual dysfunction    • Sleep apnea, obstructive    • Tenosynovitis of finger    • Tinea corporis    • Tobacco use 2018    Currently smoking 3-4 cigarettes daily   • Trigger middle finger of left hand 2017   • Trigger ring finger of left hand 2017   • Weakness of upper extremity        Past Surgical History:   Procedure Laterality Date   • ABDOMINAL SURGERY     • CARPAL TUNNEL RELEASE Bilateral    •  SECTION     • CHOLECYSTECTOMY      laparoscopic   • ESOPHAGOGASTRODUODENOSCOPY N/A 12/3/2018    Procedure: ESOPHAGOGASTRODUODENOSCOPY (EGD) AND COLONOSCOPY;  Surgeon: Timo Perez MD;  Location:  MAIN OR;  Service: Gastroenterology   • GASTRIC BYPASS      for morbid obesity with gilda en y   • HERNIA REPAIR     • HYSTERECTOMY     • FL EXCISION GANGLION WRIST DORSAL/VOLAR PRIMARY Left 12/14/2017    Procedure: LONG FINGER GANGLION CYST EXCISION;  Surgeon: Leo Vazquez MD;  Location: [facility] MAIN OR;  Service: Orthopedics   • NJ TENDON SHEATH INCISION Left 12/14/2017    Procedure: Jennifer Amador, RING, TRIGGER FINGER RELEASE;  Surgeon: Leo Vazquez MD;  Location: [facility];  Service: Orthopedics   • SHOULDER SURGERY Right        Family History   Problem Relation Age of Onset   • Alzheimer's disease Mother    • Atrial fibrillation Mother    • Dementia Mother    • Heart disease Mother         heart problem   • Seizures Mother    • Parkinsonism Father    • Arthritis Father    • Atrial fibrillation Father    • No Known Problems Daughter    • Cri-du-chat syndrome Daughter    • Colon cancer Maternal Grandmother 80   • Diabetes type II Maternal Grandmother    • No Known Problems Brother    • No Known Problems Son    • Substance Abuse Neg Hx         mother,father   • Mental illness Neg Hx         mother,father   • Colon polyps Neg Hx      I have reviewed and agree with the history as documented. E-Cigarette/Vaping   • E-Cigarette Use Never User      E-Cigarette/Vaping Substances   • Nicotine No    • THC No    • CBD No    • Flavoring No    • Other No    • Unknown No      Social History     Tobacco Use   • Smoking status: Former     Packs/day: 0.25     Years: 29.00     Total pack years: 7.25     Types: Cigarettes     Start date: 200     Quit date: 12/10/2019     Years since quitting: 3.6   • Smokeless tobacco: Never   Vaping Use   • Vaping Use: Never used   Substance Use Topics   • Alcohol use: Not Currently     Alcohol/week: 20.0 standard drinks of alcohol     Types: 20 Cans of beer per week     Comment: about 6 coors light every night, stopped in 2019   • Drug use: No       Review of Systems   Constitutional: Negative for chills and fever. HENT: Negative for congestion and rhinorrhea. Respiratory: Positive for cough and shortness of breath. Negative for chest tightness and wheezing. Cardiovascular: Negative for chest pain, palpitations and leg swelling. Gastrointestinal: Negative for abdominal pain, constipation, diarrhea, nausea and vomiting. Musculoskeletal: Negative for back pain. Neurological: Negative for dizziness, weakness and light-headedness. Physical Exam  Physical Exam  Vitals and nursing note reviewed. Constitutional:       General: She is in acute distress. Appearance: Normal appearance. She is well-developed. She is obese. She is not ill-appearing, toxic-appearing or diaphoretic. HENT:      Head: Normocephalic and atraumatic. Mouth/Throat:      Mouth: Mucous membranes are moist.   Eyes:      Conjunctiva/sclera: Conjunctivae normal.      Pupils: Pupils are equal, round, and reactive to light. Cardiovascular:      Rate and Rhythm: Normal rate and regular rhythm. Pulses: Normal pulses. Heart sounds: Normal heart sounds. No murmur heard. Pulmonary:      Effort: Tachypnea and accessory muscle usage present. No respiratory distress. Breath sounds: No stridor. Decreased breath sounds and wheezing present. No rhonchi or rales. Chest:      Chest wall: No tenderness. Abdominal:      General: Bowel sounds are normal. There is no distension. Palpations: Abdomen is soft. Tenderness: There is no abdominal tenderness. There is no guarding or rebound. Musculoskeletal:      Cervical back: Neck supple. Right lower leg: No edema. Left lower leg: No edema. Skin:     General: Skin is warm and dry. Neurological:      General: No focal deficit present. Mental Status: She is alert and oriented to person, place, and time. Mental status is at baseline.    Psychiatric:         Mood and Affect: Mood normal.         Behavior: Behavior normal.         Vital Signs  ED Triage Vitals   Temperature Pulse Respirations Blood Pressure SpO2   08/11/23 1652 08/11/23 1651 08/11/23 1652 08/11/23 1651 08/11/23 1651   98 °F (36.7 °C) 80 22 134/74 96 %      Temp Source Heart Rate Source Patient Position - Orthostatic VS BP Location FiO2 (%)   08/11/23 1652 08/11/23 1651 08/11/23 1651 08/11/23 1651 08/11/23 1813   Temporal Monitor Sitting Left arm 40      Pain Score       --                  Vitals:    08/11/23 1651   BP: 134/74   Pulse: 80   Patient Position - Orthostatic VS: Sitting         Visual Acuity      ED Medications  Medications   azithromycin (ZITHROMAX) 500 mg in sodium chloride 0.9% 250mL IVPB 500 mg (500 mg Intravenous New Bag 8/11/23 1835)   sodium chloride 0.9 % bolus 250 mL (has no administration in time range)   furosemide (LASIX) injection 60 mg (has no administration in time range)   albuterol inhalation solution 10 mg (10 mg Nebulization Given 8/11/23 1706)   ipratropium (ATROVENT) 0.02 % inhalation solution 1 mg (1 mg Nebulization Given 8/11/23 1706)   sodium chloride 0.9 % inhalation solution 12 mL (12 mL Nebulization Given 8/11/23 1706)   magnesium sulfate 2 g/50 mL IVPB (premix) 2 g (0 g Intravenous Stopped 8/11/23 1757)   methylPREDNISolone sodium succinate (Solu-MEDROL) injection 125 mg (125 mg Intravenous Given 8/11/23 1731)   cefTRIAXone (ROCEPHIN) IVPB (premix in dextrose) 1,000 mg 50 mL (0 mg Intravenous Stopped 8/11/23 1856)       Diagnostic Studies  Results Reviewed     Procedure Component Value Units Date/Time    Blood gas, arterial [[id number]] Collected: 08/11/23 1920    Lab Status: No result Specimen: Blood, Arterial from Radial, Left     B-Type Natriuretic Peptide(BNP) [[id number]] Collected: 08/11/23 1704    Lab Status: In process Specimen: Blood from Arm, Left Updated: 08/11/23 1910    HS Troponin I 4hr [[id number]]     Lab Status: No result Specimen: Blood     Blood culture #1 [[id number]] Collected: 08/11/23 1741    Lab Status:  In process Specimen: Blood from Hand, Left Updated: 08/11/23 1759    FLU/RSV/COVID - if FLU/RSV clinically relevant [[id number]]  (Normal) Collected: 08/11/23 1704    Lab Status: Final result Specimen: Nares from Nose Updated: 08/11/23 1759     SARS-CoV-2 Negative     INFLUENZA A PCR Negative     INFLUENZA B PCR Negative     RSV PCR Negative    Narrative:      FOR PEDIATRIC PATIENTS - copy/paste COVID Guidelines URL to browser: [email or web address]    SARS-CoV-2 assay is a Nucleic Acid Amplification assay intended for the  qualitative detection of nucleic acid from SARS-CoV-2 in nasopharyngeal  swabs. Results are for the presumptive identification of SARS-CoV-2 RNA. Positive results are indicative of infection with SARS-CoV-2, the virus  causing COVID-19, but do not rule out bacterial infection or co-infection  with other viruses. Laboratories within the [facility] and its  territories are required to report all positive results to the appropriate  public health authorities. Negative results do not preclude SARS-CoV-2  infection and should not be used as the sole basis for treatment or other  patient management decisions. Negative results must be combined with  clinical observations, patient history, and epidemiological information. This test has not been FDA cleared or approved. This test has been authorized by FDA under an Emergency Use Authorization  (EUA). This test is only authorized for the duration of time the  declaration that circumstances exist justifying the authorization of the  emergency use of an in vitro diagnostic tests for detection of SARS-CoV-2  virus and/or diagnosis of COVID-19 infection under section 564(b)(1) of  the Act, 21 U. S.C. 805KME-8(B)(3), unless the authorization is terminated  or revoked sooner. The test has been validated but independent review by FDA  and CLIA is pending. Test performed using [employer]: This RT-PCR assay targets N2,  a region unique to SARS-CoV-2.  A conserved region in the E-gene was chosen  for pan-Sarbecovirus detection which includes SARS-CoV-2. According to CMS-2020-01-R, this platform meets the definition of high-throughput technology. POCT Blood Gas (CG8+) [[id number]]  (Abnormal) Collected: 08/11/23 1746    Lab Status: Final result Specimen: Venous Updated: 08/11/23 1749     ph, Brian ISTAT 7.337     pCO2, Brian i-STAT 78.4 mm HG      pO2, Brian i-STAT 53.0 mm HG      BE, i-STAT 13 mmol/L      HCO3, Brian i-STAT 42.0 mmol/L      CO2, i-STAT 44 mmol/L      O2 Sat, i-STAT 83 %      SODIUM, I-STAT 134 mmol/l      Potassium, i-STAT 4.3 mmol/L      Calcium, Ionized i-STAT 1.11 mmol/L      Hct, i-STAT 32 %      Hgb, i-STAT 10.9 g/dl      Glucose, i-STAT 252 mg/dl      Specimen Type VENOUS    APTT [[id number]]  (Normal) Collected: 08/11/23 1704    Lab Status: Final result Specimen: Blood from Arm, Left Updated: 08/11/23 1748     PTT 31 seconds     Protime-INR [[id number]]  (Abnormal) Collected: 08/11/23 1704    Lab Status: Final result Specimen: Blood from Arm, Left Updated: 08/11/23 1748     Protime 15.4 seconds      INR 1.14    Procalcitonin [[id number]]  (Normal) Collected: 08/11/23 1704    Lab Status: Final result Specimen: Blood from Arm, Left Updated: 08/11/23 1742     Procalcitonin 0.05 ng/ml     HS Troponin 0hr (reflex protocol) [[id number]]  (Normal) Collected: 08/11/23 1704    Lab Status: Final result Specimen: Blood from Arm, Left Updated: 08/11/23 1741     hs TnI 0hr 7 ng/L     HS Troponin I 2hr [[id number]]     Lab Status: No result Specimen: Blood     Lactic acid [[id number]]  (Abnormal) Collected: 08/11/23 1704    Lab Status: Final result Specimen: Blood from Arm, Left Updated: 08/11/23 1737     LACTIC ACID 2.4 mmol/L     Narrative:      Result may be elevated if tourniquet was used during collection.     Lactic acid 2 Hours [[id number]]     Lab Status: No result Specimen: Blood     Comprehensive metabolic panel [[id number]]  (Abnormal) Collected: 08/11/23 1704    Lab Status: Final result Specimen: Blood from Arm, Left Updated: 08/11/23 8891 Sodium 134 mmol/L      Potassium 4.9 mmol/L      Chloride 91 mmol/L      CO2 39 mmol/L      ANION GAP 4 mmol/L      BUN 11 mg/dL      Creatinine 0.59 mg/dL      Glucose 269 mg/dL      Calcium 8.0 mg/dL      AST 28 U/L      ALT 18 U/L      Alkaline Phosphatase 119 U/L      Total Protein 6.5 g/dL      Albumin 3.8 g/dL      Total Bilirubin 0.34 mg/dL      eGFR 97 ml/min/1.73sq m     Narrative:      National Kidney Disease Foundation guidelines for Chronic Kidney Disease (CKD):   •  Stage 1 with normal or high GFR (GFR > 90 mL/min/1.73 square meters)  •  Stage 2 Mild CKD (GFR = 60-89 mL/min/1.73 square meters)  •  Stage 3A Moderate CKD (GFR = 45-59 mL/min/1.73 square meters)  •  Stage 3B Moderate CKD (GFR = 30-44 mL/min/1.73 square meters)  •  Stage 4 Severe CKD (GFR = 15-29 mL/min/1.73 square meters)  •  Stage 5 End Stage CKD (GFR <15 mL/min/1.73 square meters)  Note: GFR calculation is accurate only with a steady state creatinine    CBC and differential [[id number]]  (Abnormal) Collected: 08/11/23 1704    Lab Status: Final result Specimen: Blood from Arm, Left Updated: 08/11/23 1715     WBC 13.95 Thousand/uL      RBC 4.89 Million/uL      Hemoglobin 9.2 g/dL      Hematocrit 33.6 %      MCV 69 fL      MCH 18.8 pg      MCHC 27.4 g/dL      RDW 19.3 %      MPV 11.0 fL      Platelets 319 Thousands/uL      nRBC 0 /100 WBCs      Neutrophils Relative 77 %      Immat GRANS % 1 %      Lymphocytes Relative 13 %      Monocytes Relative 6 %      Eosinophils Relative 2 %      Basophils Relative 1 %      Neutrophils Absolute 10.95 Thousands/µL      Immature Grans Absolute 0.09 Thousand/uL      Lymphocytes Absolute 1.74 Thousands/µL      Monocytes Absolute 0.80 Thousand/µL      Eosinophils Absolute 0.28 Thousand/µL      Basophils Absolute 0.09 Thousands/µL     Blood culture #2 [[id number]] Collected: 08/11/23 1704    Lab Status:  In process Specimen: Blood from Arm, Left Updated: 08/11/23 1710    UA w Reflex to Microscopic w Reflex to Culture [[id number]]     Lab Status: No result Specimen: Urine                  X-ray chest 1 view portable   Final Result by Lakshmi George MD (08/11 1702)      Mild pulmonary vascular congestion is similar to July 22, 2023.                   Workstation performed: [id number]                    Procedures  ECG 12 Lead Documentation Only    Date/Time: 8/11/2023 5:15 PM    Performed by: Day Christina DO  Authorized by: Day Christina DO    Indications / Diagnosis:  Sob  ECG reviewed by me, the ED Provider: yes    Patient location:  ED  Previous ECG:     Previous ECG:  Compared to current    Comparison ECG info:  7/22/2023    Similarity:  No change  Interpretation:     Interpretation: non-specific    Rate:     ECG rate:  79    ECG rate assessment: normal    Rhythm:     Rhythm: sinus rhythm    Ectopy:     Ectopy: none    QRS:     QRS axis:  Normal    QRS intervals:  Normal  Conduction:     Conduction: normal    ST segments:     ST segments:  Normal  T waves:     T waves: normal    Comments:      qtc [id number]    CriticalCare Time    Date/Time: 8/11/2023 7:23 PM    Performed by: Day Christina DO  Authorized by: Day Christina DO    Critical care provider statement:     Critical care time (minutes):  45    Critical care start time:  8/11/2023 5:15 PM    Critical care end time:  8/11/2023 7:23 PM    Critical care time was exclusive of:  Separately billable procedures and treating other patients and teaching time    Critical care was necessary to treat or prevent imminent or life-threatening deterioration of the following conditions:  Respiratory failure    Critical care was time spent personally by me on the following activities:  Blood draw for specimens, obtaining history from patient or surrogate, development of treatment plan with patient or surrogate, discussions with consultants, discussions with primary provider, evaluation of patient's response to treatment, examination of patient, review of old charts, re-evaluation of patient's condition, ordering and review of radiographic studies, ordering and review of laboratory studies and ordering and performing treatments and interventions    I assumed direction of critical care for this patient from another provider in my specialty: no               ED Course  ED Course as of 08/11/23 1924   Fri Aug 11, 2023   1743 LACTIC ACID(!!): 2.4   1743 WBC(!): 13.95   1746 Will give a dose of rocephin to cover for potential superimposed PNA, though more likely COPD exacerbation as patient has chronic leukocytosis and procal is negative. 1757 pCO2, Brian i-STAT(!!): 78.4  Will currently placed on BiPAP as despite hour-long breathing treatment, steroids and magnesium, the patient's work of breathing is not significantly improved and she does have a CO2 of 78.   1814 SARS-COV-2: Negative   1824 bipap- 16/8, 40%   1832 TT critical care team for evaluation. Requesting an ABG be completed. 1848 Critical care at bedside for evaluation. 1857 Critical care evaluated patient. Patient taken off bipap, now on 6L, but will place on mid-cathy with bipap qhs. Recommending admission to SD2 level of care. Will discuss with hospitalist.   1918 Patient on 8L mid-cathy presently and appears comfortable. May need to go to 10L as would like patient SpO2 to be 92% and presently she is 90-91%. WOB is improved. SBIRT 20yo+    Flowsheet Row Most Recent Value   Initial Alcohol Screen: US AUDIT-C     1. How often do you have a drink containing alcohol? 0 Filed at: 08/11/2023 1644   2. How many drinks containing alcohol do you have on a typical day you are drinking? 0 Filed at: 08/11/2023 1644   3a. Male UNDER 65: How often do you have five or more drinks on one occasion? 0 Filed at: 08/11/2023 1644   3b. FEMALE Any Age, or MALE 65+: How often do you have 4 or more drinks on one occassion?  0 Filed at: 08/11/2023 1644   Audit-C Score 0 Filed at: 08/11/2023 1644   JEIMY: How many times in the past year have you. .. Used an illegal drug or used a prescription medication for non-medical reasons? Never Filed at: 08/11/2023 1644                    Medical Decision Making  Patient is a 53-year-old female who is brought in by ambulance for shortness of breath. Patient states that over the last few days she has been feeling progressively more short of breath associated with cough without sputum production. She states that her power went out and her son had to bring over a generator and he had to do it while he was COVID positive. Assessment and plan:  Differential includes COPD exacerbation versus CHF exacerbation versus viral upper respiratory infection versus pneumonia. Check labs to evaluate for electrolyte abnormalities, leukocytosis, anemia, kidney and liver function; chest x-ray to evaluate for pneumonia; VBG to evaluate for hypercarbia. On exam, patient has increased work of breathing, using accessory muscles and oxygen saturation 82% on 4 L    Review of medical records specifically from discharge summary from 7/26/2023 shows that the patient has a past medical history significant for COPD with chronic respiratory failure on 4 L oxygen via nasal cannula in the outpatient setting; class III severe obesity; chronic diastolic congestive heart failure; excisional atrial fibrillation on chronic anticoagulation with Eliquis; diabetes; chronic kidney disease stage II. Amount and/or Complexity of Data Reviewed  Labs: ordered. Decision-making details documented in ED Course. Radiology: ordered and independent interpretation performed. Risk  Prescription drug management. Decision regarding hospitalization.           Disposition  Final diagnoses:   Acute on chronic respiratory failure with hypoxia (HCC)   COPD exacerbation ([address])     Time reflects when diagnosis was documented in both MDM as applicable and the Disposition within this note     Time User Action Codes Description Comment 8/11/2023  4:51 PM Dipika Francois Add [J96.21] Acute on chronic respiratory failure with hypoxia ([address])     8/11/2023  5:44 PM Dipika Francois Add [J44.1] COPD exacerbation [facility])       ED Disposition     ED Disposition   Admit    Condition   Stable    Date/Time   Fri Aug 11, 2023  6:45 PM    Comment   Case was discussed with hospitalist and the patient's admission status was agreed to be Admission Status: inpatient status to the service of Dr. Fior Medina. Follow-up Information    None         Patient's Medications   Discharge Prescriptions    No medications on file       No discharge procedures on file.     PDMP Review       Value Time User    PDMP Reviewed  Yes 7/26/2023  2:32 PM Giovanny Bojorquez PA-C          ED Provider  Electronically Signed by           Dipika Francois DO  08/11/23 1924

## 2023-08-11 NOTE — PROGRESS NOTES
TRES Cuellar received a referral from RN CM as patient is admitting to depression and is interested in establishing care with a virtual [address] provider. TRES CUELLAR reviewed patient's chart and called patient ([phone]). Patient answered and stated she would be interested in OP [address] [place] [address] services. However, patient stated she may need to go to the hospital tomorrow due to having trouble breathing. TRES CUELLAR discussed with patient that she should call her PCP ASAP or 911. Patient stated she will. TRES CUELLAR sent inUnique [employer] message regarding to RN CM regarding. TRES CUELLAR and patient agreed that TRES CUELLAR will follow up with patient at a later date.

## 2023-08-11 NOTE — PROGRESS NOTES
Outpatient Care Management Note:    Linda Nikki message received from social work: I just called her. She actually said she is having trouble breathing I told her to call the PCP to discuss if they'd like her to go to the ER, She is going to call now. CM attempted to call Ivy and teams message states she is on another call and to call back. CM called Ivy back. She states that she called an ambulance and is waiting for them to arrive. Her door is open so they can get in. Ivy declined CM offering to call any family members. She declined CM staying on the phone until the ambulance arrives, stating that she has to finish getting dressed. CM agreed to follow up next week.

## 2023-08-11 NOTE — Clinical Note
Case was discussed with hospitalist and the patient's admission status was agreed to be Admission Status: inpatient status to the service of Dr. Lalo Cloud .

## 2023-08-12 LAB
ANION GAP SERPL CALCULATED.3IONS-SCNC: 12 MMOL/L
ANION GAP SERPL CALCULATED.3IONS-SCNC: 8 MMOL/L
ANISOCYTOSIS BLD QL SMEAR: PRESENT
BASE EXCESS BLDA CALC-SCNC: 8.3 MMOL/L
BASOPHILS # BLD MANUAL: 0 THOUSAND/UL (ref 0–0.1)
BASOPHILS NFR MAR MANUAL: 0 % (ref 0–1)
BILIRUB UR QL STRIP: NEGATIVE
BUN SERPL-MCNC: 13 MG/DL (ref 5–25)
BUN SERPL-MCNC: 20 MG/DL (ref 5–25)
CALCIUM SERPL-MCNC: 8.2 MG/DL (ref 8.4–10.2)
CALCIUM SERPL-MCNC: 8.4 MG/DL (ref 8.4–10.2)
CHLORIDE SERPL-SCNC: 86 MMOL/L (ref 96–108)
CHLORIDE SERPL-SCNC: 91 MMOL/L (ref 96–108)
CLARITY UR: CLEAR
CO2 SERPL-SCNC: 30 MMOL/L (ref 21–32)
CO2 SERPL-SCNC: 35 MMOL/L (ref 21–32)
COLOR UR: ABNORMAL
CREAT SERPL-MCNC: 0.58 MG/DL (ref 0.6–1.3)
CREAT SERPL-MCNC: 0.91 MG/DL (ref 0.6–1.3)
EOSINOPHIL # BLD MANUAL: 0.13 THOUSAND/UL (ref 0–0.4)
EOSINOPHIL NFR BLD MANUAL: 1 % (ref 0–6)
ERYTHROCYTE [DISTWIDTH] IN BLOOD BY AUTOMATED COUNT: 18.9 % (ref 11.6–15.1)
FERRITIN SERPL-MCNC: 7 NG/ML (ref 11–307)
GFR SERPL CREATININE-BSD FRML MDRD: 66 ML/MIN/1.73SQ M
GFR SERPL CREATININE-BSD FRML MDRD: 97 ML/MIN/1.73SQ M
GLUCOSE SERPL-MCNC: 363 MG/DL (ref 65–140)
GLUCOSE SERPL-MCNC: 373 MG/DL (ref 65–140)
GLUCOSE SERPL-MCNC: 396 MG/DL (ref 65–140)
GLUCOSE SERPL-MCNC: 403 MG/DL (ref 65–140)
GLUCOSE SERPL-MCNC: 457 MG/DL (ref 65–140)
GLUCOSE SERPL-MCNC: 478 MG/DL (ref 65–140)
GLUCOSE SERPL-MCNC: 519 MG/DL (ref 65–140)
GLUCOSE SERPL-MCNC: 574 MG/DL (ref 65–140)
GLUCOSE SERPL-MCNC: >500 MG/DL (ref 65–140)
GLUCOSE SERPL-MCNC: >500 MG/DL (ref 65–140)
GLUCOSE UR STRIP-MCNC: ABNORMAL MG/DL
HCO3 BLDA-SCNC: 35 MMOL/L (ref 22–28)
HCT VFR BLD AUTO: 31.1 % (ref 34.8–46.1)
HGB BLD-MCNC: 8.7 G/DL (ref 11.5–15.4)
HGB UR QL STRIP.AUTO: NEGATIVE
HYPERCHROMIA BLD QL SMEAR: PRESENT
IPAP: 16
IRON SATN MFR SERPL: 3 % (ref 15–50)
IRON SERPL-MCNC: 19 UG/DL (ref 50–170)
KETONES UR STRIP-MCNC: NEGATIVE MG/DL
LACTATE SERPL-SCNC: 2.6 MMOL/L (ref 0.5–2)
LEUKOCYTE ESTERASE UR QL STRIP: NEGATIVE
LYMPHOCYTES # BLD AUTO: 0.51 THOUSAND/UL (ref 0.6–4.47)
LYMPHOCYTES # BLD AUTO: 4 % (ref 14–44)
MAGNESIUM SERPL-MCNC: 2.4 MG/DL (ref 1.9–2.7)
MCH RBC QN AUTO: 19.1 PG (ref 26.8–34.3)
MCHC RBC AUTO-ENTMCNC: 28 G/DL (ref 31.4–37.4)
MCV RBC AUTO: 68 FL (ref 82–98)
MICROCYTES BLD QL AUTO: PRESENT
MONOCYTES # BLD AUTO: 0.13 THOUSAND/UL (ref 0–1.22)
MONOCYTES NFR BLD: 1 % (ref 4–12)
NEUTROPHILS # BLD MANUAL: 11.92 THOUSAND/UL (ref 1.85–7.62)
NEUTS SEG NFR BLD AUTO: 94 % (ref 43–75)
NITRITE UR QL STRIP: NEGATIVE
NON VENT- BIPAP: ABNORMAL
O2 CT BLDA-SCNC: 12.3 ML/DL (ref 16–23)
OXYHGB MFR BLDA: 93.4 % (ref 94–97)
PCO2 BLDA: 62.1 MM HG (ref 36–44)
PEEP MAX SETTING VENT: 8 CM[H2O]
PH BLDA: 7.37 [PH] (ref 7.35–7.45)
PH UR STRIP.AUTO: 5 [PH]
PHOSPHATE SERPL-MCNC: 4 MG/DL (ref 2.3–4.1)
PLATELET # BLD AUTO: 300 THOUSANDS/UL (ref 149–390)
PLATELET BLD QL SMEAR: ADEQUATE
PMV BLD AUTO: 11.3 FL (ref 8.9–12.7)
PO2 BLDA: 86.1 MM HG (ref 75–129)
POLYCHROMASIA BLD QL SMEAR: PRESENT
POTASSIUM SERPL-SCNC: 4.2 MMOL/L (ref 3.5–5.3)
POTASSIUM SERPL-SCNC: 4.4 MMOL/L (ref 3.5–5.3)
PROCALCITONIN SERPL-MCNC: 0.05 NG/ML
PROT UR STRIP-MCNC: NEGATIVE MG/DL
RBC # BLD AUTO: 4.55 MILLION/UL (ref 3.81–5.12)
RBC MORPH BLD: PRESENT
SODIUM SERPL-SCNC: 128 MMOL/L (ref 135–147)
SODIUM SERPL-SCNC: 134 MMOL/L (ref 135–147)
SP GR UR STRIP.AUTO: <1.005 (ref 1–1.03)
SPECIMEN SOURCE: ABNORMAL
TARGETS BLD QL SMEAR: PRESENT
TIBC SERPL-MCNC: 558 UG/DL (ref 250–450)
UROBILINOGEN UR STRIP-ACNC: <2 MG/DL
VENT BIPAP FIO2: 40 %
WBC # BLD AUTO: 12.68 THOUSAND/UL (ref 4.31–10.16)

## 2023-08-12 PROCEDURE — 94640 AIRWAY INHALATION TREATMENT: CPT

## 2023-08-12 PROCEDURE — 80048 BASIC METABOLIC PNL TOTAL CA: CPT

## 2023-08-12 PROCEDURE — 94760 N-INVAS EAR/PLS OXIMETRY 1: CPT

## 2023-08-12 PROCEDURE — 83550 IRON BINDING TEST: CPT | Performed by: INTERNAL MEDICINE

## 2023-08-12 PROCEDURE — 99232 SBSQ HOSP IP/OBS MODERATE 35: CPT | Performed by: INTERNAL MEDICINE

## 2023-08-12 PROCEDURE — 83540 ASSAY OF IRON: CPT | Performed by: INTERNAL MEDICINE

## 2023-08-12 PROCEDURE — 82948 REAGENT STRIP/BLOOD GLUCOSE: CPT

## 2023-08-12 PROCEDURE — 85007 BL SMEAR W/DIFF WBC COUNT: CPT

## 2023-08-12 PROCEDURE — 85027 COMPLETE CBC AUTOMATED: CPT

## 2023-08-12 PROCEDURE — 84100 ASSAY OF PHOSPHORUS: CPT

## 2023-08-12 PROCEDURE — 82728 ASSAY OF FERRITIN: CPT | Performed by: INTERNAL MEDICINE

## 2023-08-12 PROCEDURE — 84145 PROCALCITONIN (PCT): CPT

## 2023-08-12 PROCEDURE — 99255 IP/OBS CONSLTJ NEW/EST HI 80: CPT | Performed by: INTERNAL MEDICINE

## 2023-08-12 PROCEDURE — 83605 ASSAY OF LACTIC ACID: CPT

## 2023-08-12 PROCEDURE — 83735 ASSAY OF MAGNESIUM: CPT

## 2023-08-12 PROCEDURE — 82805 BLOOD GASES W/O2 SATURATION: CPT

## 2023-08-12 PROCEDURE — 82947 ASSAY GLUCOSE BLOOD QUANT: CPT | Performed by: INTERNAL MEDICINE

## 2023-08-12 RX ORDER — ACETAMINOPHEN 325 MG/1
650 TABLET ORAL EVERY 6 HOURS PRN
Status: DISCONTINUED | OUTPATIENT
Start: 2023-08-12 | End: 2023-08-22 | Stop reason: HOSPADM

## 2023-08-12 RX ORDER — INSULIN LISPRO 100 [IU]/ML
10 INJECTION, SOLUTION INTRAVENOUS; SUBCUTANEOUS ONCE
Status: COMPLETED | OUTPATIENT
Start: 2023-08-12 | End: 2023-08-12

## 2023-08-12 RX ORDER — INSULIN GLARGINE 100 [IU]/ML
10 INJECTION, SOLUTION SUBCUTANEOUS ONCE
Status: COMPLETED | OUTPATIENT
Start: 2023-08-12 | End: 2023-08-12

## 2023-08-12 RX ORDER — SODIUM CHLORIDE, SODIUM GLUCONATE, SODIUM ACETATE, POTASSIUM CHLORIDE, MAGNESIUM CHLORIDE, SODIUM PHOSPHATE, DIBASIC, AND POTASSIUM PHOSPHATE .53; .5; .37; .037; .03; .012; .00082 G/100ML; G/100ML; G/100ML; G/100ML; G/100ML; G/100ML; G/100ML
50 INJECTION, SOLUTION INTRAVENOUS CONTINUOUS
Status: DISCONTINUED | OUTPATIENT
Start: 2023-08-12 | End: 2023-08-14

## 2023-08-12 RX ADMIN — SODIUM CHLORIDE 10 UNITS/HR: 9 INJECTION, SOLUTION INTRAVENOUS at 19:12

## 2023-08-12 RX ADMIN — METHYLPREDNISOLONE SODIUM SUCCINATE 40 MG: 40 INJECTION, POWDER, FOR SOLUTION INTRAMUSCULAR; INTRAVENOUS at 06:29

## 2023-08-12 RX ADMIN — METHYLPREDNISOLONE SODIUM SUCCINATE 40 MG: 40 INJECTION, POWDER, FOR SOLUTION INTRAMUSCULAR; INTRAVENOUS at 14:22

## 2023-08-12 RX ADMIN — LEVALBUTEROL HYDROCHLORIDE 1.25 MG: 1.25 SOLUTION RESPIRATORY (INHALATION) at 20:00

## 2023-08-12 RX ADMIN — INSULIN LISPRO 16 UNITS: 100 INJECTION, SOLUTION INTRAVENOUS; SUBCUTANEOUS at 07:26

## 2023-08-12 RX ADMIN — FLUTICASONE PROPIONATE 1 SPRAY: 50 SPRAY, METERED NASAL at 08:50

## 2023-08-12 RX ADMIN — ESCITALOPRAM OXALATE 20 MG: 20 TABLET ORAL at 08:48

## 2023-08-12 RX ADMIN — METHYLPREDNISOLONE SODIUM SUCCINATE 40 MG: 40 INJECTION, POWDER, FOR SOLUTION INTRAMUSCULAR; INTRAVENOUS at 21:49

## 2023-08-12 RX ADMIN — IPRATROPIUM BROMIDE 0.5 MG: 0.5 SOLUTION RESPIRATORY (INHALATION) at 08:45

## 2023-08-12 RX ADMIN — INSULIN GLARGINE 10 UNITS: 100 INJECTION, SOLUTION SUBCUTANEOUS at 08:48

## 2023-08-12 RX ADMIN — BUDESONIDE 0.5 MG: 0.5 INHALANT ORAL at 08:45

## 2023-08-12 RX ADMIN — APIXABAN 5 MG: 5 TABLET, FILM COATED ORAL at 17:16

## 2023-08-12 RX ADMIN — SODIUM CHLORIDE, SODIUM GLUCONATE, SODIUM ACETATE, POTASSIUM CHLORIDE, MAGNESIUM CHLORIDE, SODIUM PHOSPHATE, DIBASIC, AND POTASSIUM PHOSPHATE 50 ML/HR: .53; .5; .37; .037; .03; .012; .00082 INJECTION, SOLUTION INTRAVENOUS at 18:21

## 2023-08-12 RX ADMIN — FLUTICASONE PROPIONATE 1 SPRAY: 50 SPRAY, METERED NASAL at 23:05

## 2023-08-12 RX ADMIN — ATORVASTATIN CALCIUM 40 MG: 40 TABLET, FILM COATED ORAL at 08:48

## 2023-08-12 RX ADMIN — METOPROLOL SUCCINATE 100 MG: 50 TABLET, EXTENDED RELEASE ORAL at 08:48

## 2023-08-12 RX ADMIN — LORAZEPAM 1 MG: 1 TABLET ORAL at 21:48

## 2023-08-12 RX ADMIN — FORMOTEROL FUMARATE DIHYDRATE 20 MCG: 20 SOLUTION RESPIRATORY (INHALATION) at 08:45

## 2023-08-12 RX ADMIN — APIXABAN 5 MG: 5 TABLET, FILM COATED ORAL at 08:48

## 2023-08-12 RX ADMIN — IPRATROPIUM BROMIDE 0.5 MG: 0.5 SOLUTION RESPIRATORY (INHALATION) at 14:17

## 2023-08-12 RX ADMIN — TRAZODONE HYDROCHLORIDE 150 MG: 150 TABLET ORAL at 21:48

## 2023-08-12 RX ADMIN — INSULIN LISPRO 20 UNITS: 100 INJECTION, SOLUTION INTRAVENOUS; SUBCUTANEOUS at 11:41

## 2023-08-12 RX ADMIN — IPRATROPIUM BROMIDE 0.5 MG: 0.5 SOLUTION RESPIRATORY (INHALATION) at 20:00

## 2023-08-12 RX ADMIN — FORMOTEROL FUMARATE DIHYDRATE 20 MCG: 20 SOLUTION RESPIRATORY (INHALATION) at 19:59

## 2023-08-12 RX ADMIN — LEVALBUTEROL HYDROCHLORIDE 1.25 MG: 1.25 SOLUTION RESPIRATORY (INHALATION) at 08:45

## 2023-08-12 RX ADMIN — MONTELUKAST 10 MG: 10 TABLET, FILM COATED ORAL at 21:49

## 2023-08-12 RX ADMIN — ACETAMINOPHEN 325MG 650 MG: 325 TABLET ORAL at 22:00

## 2023-08-12 RX ADMIN — INSULIN LISPRO 10 UNITS: 100 INJECTION, SOLUTION INTRAVENOUS; SUBCUTANEOUS at 16:16

## 2023-08-12 RX ADMIN — TORSEMIDE 60 MG: 20 TABLET ORAL at 17:16

## 2023-08-12 RX ADMIN — LEVALBUTEROL HYDROCHLORIDE 1.25 MG: 1.25 SOLUTION RESPIRATORY (INHALATION) at 14:17

## 2023-08-12 RX ADMIN — LORATADINE 10 MG: 10 TABLET ORAL at 08:48

## 2023-08-12 RX ADMIN — TORSEMIDE 60 MG: 20 TABLET ORAL at 08:48

## 2023-08-12 RX ADMIN — BUDESONIDE 0.5 MG: 0.5 INHALANT ORAL at 19:59

## 2023-08-12 RX ADMIN — INSULIN LISPRO 20 UNITS: 100 INJECTION, SOLUTION INTRAVENOUS; SUBCUTANEOUS at 16:31

## 2023-08-12 RX ADMIN — PANTOPRAZOLE SODIUM 40 MG: 40 TABLET, DELAYED RELEASE ORAL at 06:29

## 2023-08-12 NOTE — ASSESSMENT & PLAN NOTE
· Chronically 4L oxygen NC  · In ED placed on BIPAP then weaned to 12L [address] after receiving MERCER neb, 125mg IV solumedrol and 2g IV magnesium  · ABG on 6L oxygnen: pH 7.3, pCO2 64.8, pO2 65.3, HCO3 33.7  · Received IV azithromycin and ceftriaxone in ED    Plan  · Consult Pulmonology  · Respiratory protocol  · Wean oxygen to baseline as able

## 2023-08-12 NOTE — H&P
[facility]  H&P  Name: Kg Casanova 57 y.o. female I MRN: [id number]  Unit/Bed#: -01 SDU I Date of Admission: 8/11/2023   Date of Service: 8/11/2023 I Hospital Day: 0      Assessment/Plan   Class 3 severe obesity in adult [facility])  Assessment & Plan  • Encourage lifestyle changes    Asthma with COPD with exacerbation ([address] )  Assessment & Plan  • Home regimen: Albuterol, Bevespi, Budesonide, Benralalizumab, Montelukast, Xopenex  • Presented to ED from home via EMS for SOB since Tuesday. Patient states she has been noncompliant with nebulizer treatments and BIPAP HS at home since being discharged on 7/26/23. She says she has been compliant with inhalers, oral medications and CPAP HS. • In ED placed on BIPAP then weaned to 12L [address] after receiving MERCER neb, 125mg IV solumedrol and 2g IV magnesium  • ABG on 6L oxygen: pH 7.3, pCO2 64.8, pO2 65.3, HCO3 33.7  • Received IV azithromycin and ceftriaxone in ED  • Afebrile, procal 0.05     Plan  • IV Solumedrol 40mg Q8H   • Xopenex/Atrovent TID  • Pulmicort BID  • Performist BID   • Will monitor off futher ABX. • Respiratory protocol  • Pulmonology consult    Lactic acidosis  Assessment & Plan  • LA 2.4->2. 6     Plan  • Continue to trend    Chronic diastolic congestive heart failure (HCC)  Assessment & Plan  Wt Readings from Last 3 Encounters:   08/11/23 132 kg (292 lb 1.8 oz)   07/26/23 132 kg (291 lb 9.6 oz)   06/08/23 129 kg (284 lb)     • Does not appear fluid overload on physical exam. No crackles.  No lower extremity edema  • BNP 73  • Received 60mg IV lasix in ED     Plan  • Hold further IV diuretics  • Continue home torsemide  • Strict I/O  • Daily weights        Paroxysmal atrial fibrillation (HCC)  Assessment & Plan  • Home regimen: Eliquis, metoprolol  • Currently SR in hospital     Plan  • Continue Eliquis and metoprol    Acute on chronic respiratory failure with hypoxia (HCC)  Assessment & Plan  • Chronically 4L oxygen NC  • In ED placed on BIPAP then weaned to 12L [address] after receiving [place] neb, 125mg IV solumedrol and 2g IV magnesium  • ABG on 6L oxygnen: pH 7.3, pCO2 64.8, pO2 65.3, HCO3 33.7  • Received IV azithromycin and ceftriaxone in ED     Plan  • Consult Pulmonology  • Respiratory protocol  • Wean oxygen to baseline as able    Type 2 diabetes mellitus with stage 2 chronic kidney disease, with long-term current use of insulin (HCC)  Assessment & Plan  Lab Results   Component Value Date    HGBA1C 9.9 (H) 05/11/2023       Recent Labs     08/11/23 2122   POCGLU 445*       Blood Sugar Average: Last 72 hrs:  (P) 445   • Home regimen: Metformin, Glargine 30 units daily at bedtime, glulisine 24 units with breakfast, 12 units with lunch and dinner     Plan  • SSI   • Hypoglycemia protocol  • Goal -180    Obstructive sleep apnea  Assessment & Plan  • Discharged 7/26 with Rx for nocturnal BIPAP  • Patient states she is noncompliant with BIPAP due to personal preference at home and continues to wear CPAP      Plan  • Tolerated BIPAP in hospital -->continue BIPAP HS         History of Present Illness     HPI: Jing Forrester is a 57 y.o. who presents to ED from home via EMS for SOB since Tuesday. Patient states she has been noncompliant with nebulizer treatments and BIPAP HS at home since being discharged on 7/26/23. She says she has been compliant with inhalers, oral medications and CPAP HS. In ED placed on BIPAP then weaned to 12L [address] after receiving [place] neb, 125mg IV solumedrol and 2g IV magnesium. ABG on 6L oxygen: pH 7.3, pCO2 64.8, pO2 65.3, HCO3 33.7. Received IV azithromycin and ceftriaxone in ED. PMH: COPD, asthma, PAF, diastolic HF, obesity, DM2,    History obtained from chart review and the patient. Review of Systems   Constitutional: Negative. HENT: Negative. Eyes: Negative. Respiratory: Negative. Cardiovascular: Negative. Gastrointestinal: Negative. Endocrine: Negative.     Musculoskeletal: Negative. Skin: Negative. Allergic/Immunologic: Negative. Neurological: Negative. Hematological: Negative. Psychiatric/Behavioral: Negative. Historical Information   Past Medical History:  2021: Acute blood loss anemia  2021: Acute diverticulitis  2020: Alcohol abuse  No date: Anemia  No date: Atrial fibrillation (HCC)  No date: Cervical radiculopathy  No date: CHF (congestive heart failure) (HCC)  No date: Chronic low back pain  2018: Chronic obstructive asthma ([address])  2019: Cigarette nicotine dependence without complication      Comment:  Quit 12/10/2019.   2020: Community acquired pneumonia  3/9/2014: Depression with anxiety  No date: Diabetes mellitus with hyperglycemia ([address])  2021: Diverticulitis  No date: Elevated liver enzymes  No date: GERD (gastroesophageal reflux disease)  10/28/2020: Hypersomnolence  2018: Hypokalemia  No date: Paresthesia of upper extremity  No date: Plantar fasciitis  2014: Restless legs syndrome  No date: Sexual dysfunction  No date: Sleep apnea, obstructive  No date: Tenosynovitis of finger  No date: Tinea corporis  2018: Tobacco use      Comment:  Currently smoking 3-4 cigarettes daily  2017: Trigger middle finger of left hand  2017: Trigger ring finger of left hand  No date: Weakness of upper extremity Past Surgical History:  No date: ABDOMINAL SURGERY  No date: CARPAL TUNNEL RELEASE; Bilateral  No date:  SECTION  No date: CHOLECYSTECTOMY      Comment:  laparoscopic  12/3/2018: ESOPHAGOGASTRODUODENOSCOPY; N/A      Comment:  Procedure: ESOPHAGOGASTRODUODENOSCOPY (EGD) AND                COLONOSCOPY;  Surgeon: Eddie Vidal MD;  Location:                 MAIN OR;  Service: Gastroenterology  No date: GASTRIC BYPASS      Comment:  for morbid obesity with gilda en y  No date: HERNIA REPAIR  No date: HYSTERECTOMY  2017: CT EXCISION GANGLION WRIST DORSAL/VOLAR PRIMARY;  Left      Comment: Procedure: LONG FINGER GANGLION CYST EXCISION;  Surgeon:               Shannon Chapman MD;  Location: [facility];  Service:                Orthopedics  12/14/2017: SD TENDON SHEATH INCISION; Left      Comment:  Procedure: THUMB, LONG, RING, TRIGGER FINGER RELEASE;                 Surgeon: Shannon Chapman MD;  Location: [facility];                 Service: Orthopedics  No date: SHOULDER SURGERY; Right   Current Outpatient Medications   Medication Instructions   • albuterol (PROVENTIL HFA,VENTOLIN HFA) 90 mcg/act inhaler 2 puffs, Inhalation, Every 4 hours PRN   • albuterol 2.5 mg, Nebulization, Every 6 hours PRN   • apixaban (ELIQUIS) 5 mg, Oral, 2 times daily   • atorvastatin (LIPITOR) 40 mg, Oral, Daily   • Benralizumab 30 MG/ML SOAJ 1 mL, Subcutaneous, Every 28 days   • Blood Glucose Monitoring Suppl ([employer] CONTOUR NEXT MONITOR) w/Device KIT Does not apply, Daily   • budesonide (PULMICORT) 0.5 mg, Nebulization, 2 times daily, Rinse mouth after use. • Contour Next Test test strip USE TO TEST BLOOD SUGAR 3 TIMES A DAY   • [employer] Lancets Thin 26G MISC USE 3 (THREE) TIMES A DAY   • EPINEPHrine (EPIPEN) 0.3 mg, Intramuscular, Once   • escitalopram (LEXAPRO) 20 mg tablet TAKE 1 TABLET BY MOUTH EVERY DAY   • ferrous sulfate 220 mg, Oral, 2 times daily before meals   • fluticasone (FLONASE) 50 mcg/act nasal spray 1 spray, Nasal, 2 times daily   • glycopyrrolate-formoterol (BEVESPI AEROSPHERE) 9-4.8 MCG/ACT inhaler 2 puffs, Inhalation, 2 times daily   • Insulin Glargine Solostar (LANTUS SOLOSTAR) 40 Units, Subcutaneous, Daily at bedtime   • insulin glulisine (Apidra SoloStar) 100 units/mL injection pen 24 UNITS BREAKFAST 15 UNITS AT LUNCH AND DINNER   • Insulin Pen Needle (BD Pen Needle Love 2nd Gen) 32G X 4 MM MISC Does not apply, Daily at bedtime, Use 4 new needles daily .    • levalbuterol (XOPENEX) 1.25 mg, Nebulization, 2 times daily (RESP)   • loratadine (CLARITIN) 10 mg tablet Oral   • LORazepam (ATIVAN) 0.5 mg tablet TAKE 2 TABLETS BY MOUTH AT BEDTIME AND 1 EVERY 6 HOURS AS NEEDED FOR ANXIETY   • metFORMIN (GLUCOPHAGE-XR) 500 mg 24 hr tablet TAKE 2 TABLETS BY MOUTH TWICE A DAY WITH FOOD   • metoprolol succinate (TOPROL-XL) 100 mg, Oral, Daily   • montelukast (SINGULAIR) 10 mg tablet TAKE 1 TABLET BY MOUTH DAILY AT BEDTIME   • naloxone (NARCAN) 4 mg/0.1 mL nasal spray Administer 1 spray into a nostril. If breathing does not return to normal or if breathing difficulty resumes after 2-3 minutes, give another dose in the other nostril using a new spray. • omeprazole (PRILOSEC) 40 mg, Oral, Daily   • potassium chloride (Klor-Con M20) 20 mEq tablet 20 mEq, Oral, 2 times daily   • semaglutide (0.25 or 0.5 mg/dose) (OZEMPIC (0.25 OR 0.5 MG/DOSE)) 0.5 mg, Subcutaneous, Every 7 days   • torsemide (DEMADEX) 60 mg, Oral, 2 times daily   • traZODone (DESYREL) 150 mg, Oral, Daily at bedtime    Allergies   Allergen Reactions   • Iron Dextran Swelling   • Januvia [Sitagliptin] Shortness Of Breath   • Jardiance [Empagliflozin] Shortness Of Breath   • Clonazepam Other (See Comments)     Unknown reaction   • Codeine Itching and Other (See Comments)     itch;mary kay morphine,takes ultram @home   • Adhesive [Medical Tape] Itching     itching   • Effexor [Venlafaxine] Itching   • Lactose - Food Allergy GI Intolerance   • Oxycodone-Acetaminophen Anxiety   • Oxycodone-Acetaminophen Itching and Rash     Other reaction(s):  Other (See Comments)  (PERCOCET) MILD RASH, not allergic to Acetaminophen       Social History     Tobacco Use   • Smoking status: Former     Packs/day: 0.25     Years: 29.00     Total pack years: 7.25     Types: Cigarettes     Start date: 200     Quit date: 12/10/2019     Years since quitting: 3.6   • Smokeless tobacco: Never   Vaping Use   • Vaping Use: Never used   Substance Use Topics   • Alcohol use: Not Currently     Alcohol/week: 20.0 standard drinks of alcohol     Types: 20 Cans of beer per week     Comment: about 6 coors light every night, stopped in 2019   • Drug use: No    Family History   Problem Relation Age of Onset   • Alzheimer's disease Mother    • Atrial fibrillation Mother    • Dementia Mother    • Heart disease Mother         heart problem   • Seizures Mother    • Parkinsonism Father    • Arthritis Father    • Atrial fibrillation Father    • No Known Problems Daughter    • Cri-du-chat syndrome Daughter    • Colon cancer Maternal Grandmother 80   • Diabetes type II Maternal Grandmother    • No Known Problems Brother    • No Known Problems Son    • Substance Abuse Neg Hx         mother,father   • Mental illness Neg Hx         mother,father   • Colon polyps Neg Hx           Objective                            Vitals I/O      Most Recent Min/Max in 24hrs   Temp 98 °F (36.7 °C) Temp  Min: 98 °F (36.7 °C)  Max: 98 °F (36.7 °C)   Pulse 93 Pulse  Min: 80  Max: 93   Resp (!) 32 Resp  Min: 22  Max: 32   /74 BP  Min: 134/74  Max: 134/74   O2 Sat 98 % SpO2  Min: 92 %  Max: 98 %      Intake/Output Summary (Last 24 hours) at 8/11/2023 2158  Last data filed at 8/11/2023 2149  Gross per 24 hour   Intake 650 ml   Output 550 ml   Net 100 ml         Diet Antwan/CHO Controlled; Consistent Carbohydrate Diet Level 2 (5 carb servings/75 grams CHO/meal)     Invasive Monitoring Physical exam   N/A Physical Exam  Eyes:      Extraocular Movements: Extraocular movements intact. Pupils: Pupils are equal, round, and reactive to light. Skin:     General: Skin is warm. Capillary Refill: Capillary refill takes less than 2 seconds. Coloration: Skin is not pale. HENT:      Head: Normocephalic and atraumatic. Mouth/Throat:      Mouth: Mucous membranes are dry. Cardiovascular:      Rate and Rhythm: Normal rate and regular rhythm. Pulses: Normal pulses. Musculoskeletal:         General: No swelling. Abdominal:      General: Bowel sounds are normal. There is no distension. Palpations: Abdomen is soft. Tenderness: There is no abdominal tenderness. Constitutional:       General: She is not in acute distress. Appearance: She is not ill-appearing. Pulmonary:      Effort: Tachypnea present. Breath sounds: Decreased breath sounds (in bilateral lower lobes) present. No wheezing. Neurological:      General: No focal deficit present. Mental Status: She is alert and oriented to person, place and time. Vitals and nursing note reviewed. Diagnostic Studies      EKG: Sinus rhythm  Imaging: I have personally reviewed pertinent reports.    and I have personally reviewed pertinent films in PACS     Medications:  Scheduled PRN   apixaban, 5 mg, BID  [START ON 8/12/2023] atorvastatin, 40 mg, Daily  budesonide, 0.5 mg, Q12H  [START ON 8/12/2023] escitalopram, 20 mg, Daily  fluticasone, 1 spray, BID  formoterol, 20 mcg, Q12H  insulin glargine, 40 Units, HS  [START ON 8/12/2023] insulin lispro, 4-20 Units, TID AC  insulin lispro, 4-20 Units, HS  ipratropium, 0.5 mg, TID  levalbuterol, 1.25 mg, TID  [START ON 8/12/2023] loratadine, 10 mg, Daily  LORazepam, 1 mg, HS  methylPREDNISolone sodium succinate, 40 mg, Q8H [address]  [START ON 8/12/2023] metoprolol succinate, 100 mg, Daily  montelukast, 10 mg, HS  [START ON 8/12/2023] pantoprazole, 40 mg, Early Morning  torsemide, 60 mg, BID  traZODone, 150 mg, HS          Continuous          Labs:    CBC    Recent Labs     08/11/23 1704 08/11/23 1746   WBC 13.95*  --    HGB 9.2* 10.9*   HCT 33.6* 32*     --      BMP    Recent Labs     08/11/23 1704 08/11/23  1746   SODIUM 134*  --    K 4.9  --    CL 91*  --    CO2 39* 44*   AGAP 4  --    BUN 11  --    CREATININE 0.59*  --    CALCIUM 8.0*  --        Coags    Recent Labs     08/11/23 1704   INR 1.14   PTT 31        Additional Electrolytes  Recent Labs     08/11/23 1746   CAIONIZED 1.11*          Blood Gas    Recent Labs     08/11/23 1920   PHART 7.334*   CWP1IML 64.8*   PO2ART 65.3*   JDS5WOV 33.7*   BEART 6.4 SOURCE Radial, Left     Recent Labs     08/11/23  1920   SOURCE Radial, Left    LFTs  Recent Labs     08/11/23  1704   ALT 18   AST 28   ALKPHOS 119*   ALB 3.8   TBILI 0.34       Infectious  Recent Labs     08/11/23  1704   PROCALCITONI 0.05     Glucose  Recent Labs     08/11/23  1704   GLUC 269*             Anticipated Length of Stay is > 2 [address]

## 2023-08-12 NOTE — ASSESSMENT & PLAN NOTE
• Chronically 4L oxygen NC  • In ED placed on BIPAP then weaned to 12L [address] after receiving [place] neb, 125mg IV solumedrol and 2g IV magnesium  • ABG on 6L oxygnen: pH 7.3, pCO2 64.8, pO2 65.3, HCO3 33.7  • Received IV azithromycin and ceftriaxone in ED     Plan  • Consult Pulmonology  • Respiratory protocol  • Wean oxygen to baseline as able

## 2023-08-12 NOTE — ASSESSMENT & PLAN NOTE
Lab Results   Component Value Date    HGBA1C 9.9 (H) 05/11/2023       Recent Labs     08/11/23 2122 08/12/23  0620   POCGLU 445* 373*       Blood Sugar Average: Last 72 hrs:  (P) 409   • Home regimen: Metformin, Glargine 30 units daily at bedtime, glulisine 24 units with breakfast, 12 units with lunch and dinner     Plan  • SSI   • Hypoglycemia protocol  • Goal -180

## 2023-08-12 NOTE — PLAN OF CARE
Problem: INFECTION - ADULT  Goal: Absence or prevention of progression during hospitalization  Description: INTERVENTIONS:  - Assess and monitor for signs and symptoms of infection  - Monitor lab/diagnostic results  - Monitor all insertion sites, i.e. indwelling lines, tubes, and drains  - Monitor endotracheal if appropriate and nasal secretions for changes in amount and color  - [place] appropriate cooling/warming therapies per order  - Administer medications as ordered  - Instruct and encourage patient and family to use good hand hygiene technique  - Identify and instruct in appropriate isolation precautions for identified infection/condition  Outcome: Progressing     Problem: DISCHARGE PLANNING  Goal: Discharge to home or other facility with appropriate resources  Description: INTERVENTIONS:  - Identify barriers to discharge w/patient and caregiver  - Arrange for needed discharge resources and transportation as appropriate  - Identify discharge learning needs (meds, wound care, etc.)  - Arrange for interpretive services to assist at discharge as needed  - Refer to Case Management Department for coordinating discharge planning if the patient needs post-hospital services based on physician/advanced practitioner order or complex needs related to functional status, cognitive ability, or social support system  Outcome: Progressing     Problem: SAFETY ADULT  Goal: Patient will remain free of falls  Description: INTERVENTIONS:  - Educate patient/family on patient safety including physical limitations  - Instruct patient to call for assistance with activity   - Consult OT/PT to assist with strengthening/mobility   - Keep Call bell within reach  - Keep bed low and locked with side rails adjusted as appropriate  - Keep care items and personal belongings within reach  - Initiate and maintain comfort rounds  - Make Fall Risk Sign visible to staff  - Offer Toileting every  Hours, in advance of need  - Initiate/Maintain alarm  - Obtain necessary fall risk management equipment:   - Apply yellow socks and bracelet for high fall risk patients  - Consider moving patient to room near nurses station  Outcome: Progressing  Goal: Maintain or return to baseline ADL function  Description: INTERVENTIONS:  -  Assess patient's ability to carry out ADLs; assess patient's baseline for ADL function and identify physical deficits which impact ability to perform ADLs (bathing, care of mouth/teeth, toileting, grooming, dressing, etc.)  - Assess/evaluate cause of self-care deficits   - Assess range of motion  - Assess patient's mobility; develop plan if impaired  - Assess patient's need for assistive devices and provide as appropriate  - Encourage maximum independence but intervene and supervise when necessary  - Involve family in performance of ADLs  - Assess for home care needs following discharge   - Consider OT consult to assist with ADL evaluation and planning for discharge  - Provide patient education as appropriate  Outcome: Progressing

## 2023-08-12 NOTE — ASSESSMENT & PLAN NOTE
• Chronically 4L oxygen NC  • In ED placed on BIPAP then weaned to 12L [address] after receiving [place] neb, 125mg IV solumedrol and 2g IV magnesium  • ABG on 6L oxygnen: pH 7.3, pCO2 64.8, pO2 65.3, HCO3 33.7  • Received IV azithromycin and ceftriaxone in ED     Plan  • Consult Pulmonology  • Respiratory protocol  • Wean oxygen to baseline as able

## 2023-08-12 NOTE — ASSESSMENT & PLAN NOTE
· Discharged 7/26 with Rx for nocturnal BIPAP  · Patient states she is noncompliant with BIPAP due to personal preference at home and continues to wear CPAP     Plan  · Tolerated BIPAP in hospital -->continue BIPAP HS

## 2023-08-12 NOTE — ASSESSMENT & PLAN NOTE
Lab Results   Component Value Date    HGBA1C 9.9 (H) 05/11/2023       No results for input(s): "POCGLU" in the last 72 hours.     Blood Sugar Average: Last 72 hrs:  · Home regimen: Metformin, Glargine 30 units daily at bedtime, glulisine 24 units with breakfast, 12 units with lunch and dinner    Plan  · SSI   · Hypoglycemia protocol  · Goal -180

## 2023-08-12 NOTE — ASSESSMENT & PLAN NOTE
Lab Results   Component Value Date    HGBA1C 9.9 (H) 05/11/2023       Recent Labs     08/11/23 2122   POCGLU 445*       Blood Sugar Average: Last 72 hrs:  (P) 445   • Home regimen: Metformin, Glargine 30 units daily at bedtime, glulisine 24 units with breakfast, 12 units with lunch and dinner     Plan  • SSI   • Hypoglycemia protocol  • Goal -180

## 2023-08-12 NOTE — ASSESSMENT & PLAN NOTE
• Discharged 7/26 with Rx for nocturnal BIPAP  • Patient states she is noncompliant with BIPAP due to personal preference at home and continues to wear CPAP      Plan  • Tolerated BIPAP in hospital -->continue BIPAP HS

## 2023-08-12 NOTE — ASSESSMENT & PLAN NOTE
· Home regimen: Albuterol, Bevespi, Budesonide, Benralalizumab, Montelukast, Xopenex  · Presented to ED from home via EMS for SOB since Tuesday. Patient states she has been noncompliant with nebulizer treatments and BIPAP HS at home since being discharged on 7/26/23. She says she has been compliant with inhalers, oral medications and CPAP HS. · In ED placed on BIPAP then weaned to 12L [address] after receiving [place] neb, 125mg IV solumedrol and 2g IV magnesium  · ABG on 6L oxygen: pH 7.3, pCO2 64.8, pO2 65.3, HCO3 33.7  · Received IV azithromycin and ceftriaxone in ED  · Afebrile, procal 0.05    Plan  · IV Solumedrol 40mg Q8H   · Xopenex/Atrovent TID  · Pulmicort BID  · Performist BID   · Will monitor off futher ABX.    · Respiratory protocol  · Pulmonology consult

## 2023-08-12 NOTE — ASSESSMENT & PLAN NOTE
Wt Readings from Last 3 Encounters:   08/11/23 132 kg (292 lb 1.8 oz)   07/26/23 132 kg (291 lb 9.6 oz)   06/08/23 129 kg (284 lb)     • Does not appear fluid overload on physical exam. No crackles.  No lower extremity edema  • BNP 73  • Received 60mg IV lasix in ED     Plan  • Hold further IV diuretics  • Continue home torsemide  • Strict I/O  • Daily weights

## 2023-08-12 NOTE — PROGRESS NOTES
[facility]  Progress Note  Name: Jonathan Herman  MRN: [id number]  Unit/Bed#: -01 SDU I Date of Admission: 8/11/2023   Date of Service: 8/12/2023 I Hospital Day: 1    Assessment/Plan   Class 3 severe obesity in adult [facility])  Assessment & Plan  • Encourage weight loss and lifestyle changes    Asthma with COPD with exacerbation ([address])  Assessment & Plan  • Home regimen: Albuterol, Bevespi, Budesonide, Benralalizumab, Montelukast, Xopenex  • Presented to ED from home via EMS for SOB since Tuesday. Patient states she has been noncompliant with nebulizer treatments and BIPAP HS at home since being discharged on 7/26/23. She says she has been compliant with inhalers, oral medications and CPAP HS. • In ED placed on BIPAP then weaned to 12L [address] after receiving MERCER neb, 125mg IV solumedrol and 2g IV magnesium  • ABG on 6L oxygen: pH 7.3, pCO2 64.8, pO2 65.3, HCO3 33.7  • Received IV azithromycin and ceftriaxone in ED  • Afebrile, procal 0.05     Plan  • IV Solumedrol 40mg Q8H   • Xopenex/Atrovent TID  • Pulmicort BID  • Performist BID   • Procalcitonin x2 is negative  • Will monitor off futher ABX. • Respiratory protocol  • Pulmonology consult    Lactic acidosis  Assessment & Plan  • LA 2.4->2. 6     Plan  • Continue to trend    Chronic diastolic congestive heart failure (HCC)  Assessment & Plan  Wt Readings from Last 3 Encounters:   08/12/23 132 kg (292 lb 1.8 oz)   07/26/23 132 kg (291 lb 9.6 oz)   06/08/23 129 kg (284 lb)     • Does not appear fluid overload on physical exam. No crackles.  No lower extremity edema  • BNP 73  • Received 60mg IV lasix in ED     Plan  • Hold further IV diuretics  • Continue home torsemide  • Strict I/O  • Daily weights  · Trend BMP    Paroxysmal atrial fibrillation (HCC)  Assessment & Plan  • Home regimen: Eliquis, metoprolol  • Currently SR in hospital     Plan  • Continue Eliquis and metoprol    Acute on chronic respiratory failure with hypoxia [facility])  Assessment & Plan  • Chronically 4L oxygen NC  • In ED placed on BIPAP then weaned to 12L [address] after receiving MERCER neb, 125mg IV solumedrol and 2g IV magnesium  • ABG on 6L oxygnen: pH 7.3, pCO2 64.8, pO2 65.3, HCO3 33.7  • Received IV azithromycin and ceftriaxone in ED     Plan  • Consult Pulmonology  • Respiratory protocol  • Wean oxygen to baseline as able    Type 2 diabetes mellitus with stage 2 chronic kidney disease, with long-term current use of insulin [facility])  Assessment & Plan  Lab Results   Component Value Date    HGBA1C 9.9 (H) 05/11/2023       Recent Labs     08/11/23 2122 08/12/23  0620   POCGLU 445* 373*       Blood Sugar Average: Last 72 hrs:  (P) 409   • Home regimen: Metformin, Glargine 30 units daily at bedtime, glulisine 24 units with breakfast, 12 units with lunch and dinner     Plan  • SSI   • Hypoglycemia protocol  • Goal -180    Obstructive sleep apnea  Assessment & Plan  • Discharged 7/26 with Rx for nocturnal BIPAP  • Patient states she is noncompliant with BIPAP due to personal preference at home and continues to wear CPAP      Plan  • Tolerated BIPAP in hospital -->continue BIPAP HS         Labs & Imaging: I have personally reviewed pertinent reports. VTE Prophylaxis: in place. Code Status:   Level 1 - Full Code    Patient Centered Rounds: I have performed bedside rounds with nursing staff today. Discussions with Specialists or Other Care Team Provider: Pulmonary    Current Length of Stay: 1 day(s)    Current Patient Status: Inpatient   Certification Statement: The patient will continue to require additional inpatient hospital stay due to see my assessment and plan. Subjective:   Patient is seen and examined at bedside. Denies any new complaints. Has shortness of breath. Afebrile. Currently on BiPAP  All other ROS are negative. Objective:    Vitals: Blood pressure 112/56, pulse 79, temperature 98.3 °F (36.8 °C), temperature source Oral, resp.  rate 17, height 5' 3" (1.6 m), weight 132 kg (292 lb 1.8 oz), SpO2 90 %, not currently breastfeeding. ,Body mass index is 51.74 kg/m². SPO2 RA Rest    Flowsheet Row ED to Hosp-Admission (Current) from 8/11/2023 in [facility] Intensive [facility] Unit   SpO2 90 %   SpO2 Activity At Rest   O2 Device Nasal cannula   O2 Flow Rate --        I&O:     Intake/Output Summary (Last 24 hours) at 8/12/2023 0810  Last data filed at 8/12/2023 0114  Gross per 24 hour   Intake 1130 ml   Output 950 ml   Net 180 ml       Physical Exam:    General- Alert, lying comfortably in bed. Not in any acute distress. Neck- Supple, No JVD  CVS- regular, S1 and S2 normal  Chest- Bilateral Air entry, decreased air entry bilaterally  Abdomen- soft, nontender, not distended, no guarding or rigidity, BS+  Extremities-  No pedal edema, No calf tenderness                         Normal ROM in all extremities. CNS-   Alert, awake and orientedx3. No focal deficits present.     Invasive Devices     Peripheral Intravenous Line  Duration           Peripheral IV 08/11/23 Left Forearm 1 day    Peripheral IV 08/11/23 Proximal;Right;Ventral (anterior) Forearm <1 day                      Social History  reviewed  Family History   Problem Relation Age of Onset   • Alzheimer's disease Mother    • Atrial fibrillation Mother    • Dementia Mother    • Heart disease Mother         heart problem   • Seizures Mother    • Parkinsonism Father    • Arthritis Father    • Atrial fibrillation Father    • No Known Problems Daughter    • Cri-du-chat syndrome Daughter    • Colon cancer Maternal Grandmother 80   • Diabetes type II Maternal Grandmother    • No Known Problems Brother    • No Known Problems Son    • Substance Abuse Neg Hx         mother,father   • Mental illness Neg Hx         mother,father   • Colon polyps Neg Hx     reviewed    Meds:  Current Facility-Administered Medications   Medication Dose Route Frequency Provider Last Rate Last Admin   • apixaban (ELIQUIS) tablet 5 mg  5 mg Oral BID VICKY Rajput   5 mg at 08/11/23 2157   • atorvastatin (LIPITOR) tablet 40 mg  40 mg Oral Daily VICKY Rajput       • budesonide (PULMICORT) inhalation solution 0.5 mg  0.5 mg Nebulization Q12H VICKY Rajput   0.5 mg at 08/11/23 2047   • escitalopram (LEXAPRO) tablet 20 mg  20 mg Oral Daily VICKY Rajput       • fluticasone [facility]) 50 mcg/act nasal spray 1 spray  1 spray Nasal BID VICKY Rajput   1 spray at 08/11/23 2220   • formoterol (PERFOROMIST) nebulizer solution 20 mcg  20 mcg Nebulization Q12H VICKY Rajput   20 mcg at 08/11/23 2047   • insulin glargine (LANTUS) subcutaneous injection 10 Units 0.1 mL  10 Units Subcutaneous Once Lesly Nicole MD       • insulin glargine (LANTUS) subcutaneous injection 40 Units 0.4 mL  40 Units Subcutaneous HS VICKY Rajput   40 Units at 08/11/23 2220   • insulin lispro (HumaLOG) 100 units/mL subcutaneous injection 4-20 Units  4-20 Units Subcutaneous TID AC VICKY Rajput   16 Units at 08/12/23 3193   • insulin lispro (HumaLOG) 100 units/mL subcutaneous injection 4-20 Units  4-20 Units Subcutaneous HS VICKY Rajput   20 Units at 08/11/23 2220   • ipratropium (ATROVENT) 0.02 % inhalation solution 0.5 mg  0.5 mg Nebulization TID VICKY Rajput   0.5 mg at 08/11/23 2047   • levalbuterol (Hardy Frohlich) inhalation solution 1.25 mg  1.25 mg Nebulization TID VICKY Rajput   1.25 mg at 08/11/23 2047   • loratadine (CLARITIN) tablet 10 mg  10 mg Oral Daily VICKY Rajput       • LORazepam (ATIVAN) tablet 1 mg  1 mg Oral HS VICKY Rajput   1 mg at 08/11/23 2156   • methylPREDNISolone sodium succinate (Solu-MEDROL) injection 40 mg  40 mg Intravenous [facility] VICKY Rajput   40 mg at 08/12/23 7399   • metoprolol succinate (TOPROL-XL) 24 hr tablet 100 mg  100 mg Oral Daily [place] Cristhianr Cheyanne John       • montelukast (SINGULAIR) tablet 10 mg  10 mg Oral HS VICKY Bingham   10 mg at 08/11/23 2157   • pantoprazole (PROTONIX) EC tablet 40 mg  40 mg Oral Early Morning VICKY Bingham   40 mg at 08/12/23 0077   • torsemide (DEMADEX) tablet 60 mg  60 mg Oral BID VICKY Bingham       • traZODone (DESYREL) tablet 150 mg  150 mg Oral HS VICKY Bingham   150 mg at 08/11/23 2156      Medications Prior to Admission   Medication   • apixaban (Eliquis) 5 mg   • atorvastatin (LIPITOR) 40 mg tablet   • escitalopram (LEXAPRO) 20 mg tablet   • fluticasone (FLONASE) 50 mcg/act nasal spray   • Insulin Glargine Solostar (Lantus SoloStar) 100 UNIT/ML SOPN   • insulin glulisine (Apidra SoloStar) 100 units/mL injection pen   • loratadine (CLARITIN) 10 mg tablet   • LORazepam (ATIVAN) 0.5 mg tablet   • metFORMIN (GLUCOPHAGE-XR) 500 mg 24 hr tablet   • metoprolol succinate (TOPROL-XL) 100 mg 24 hr tablet   • montelukast (SINGULAIR) 10 mg tablet   • omeprazole (PriLOSEC) 40 MG capsule   • potassium chloride (Klor-Con M20) 20 mEq tablet   • torsemide (DEMADEX) 20 mg tablet   • traZODone (DESYREL) 150 mg tablet   • albuterol (2.5 mg/3 mL) 0.083 % nebulizer solution   • albuterol (PROVENTIL HFA,VENTOLIN HFA) 90 mcg/act inhaler   • Benralizumab 30 MG/ML SOAJ   • Blood Glucose Monitoring Suppl (NABILA CONTOUR NEXT MONITOR) w/Device KIT   • budesonide (PULMICORT) 0.5 mg/2 mL nebulizer solution   • Contour Next Test test strip   • CVS Lancets Thin 26G MISC   • EPINEPHrine (EPIPEN) 0.3 mg/0.3 mL SOAJ   • ferrous sulfate 220 (44 Fe) mg/5 mL solution   • glycopyrrolate-formoterol (BEVESPI AEROSPHERE) 9-4.8 MCG/ACT inhaler   • Insulin Pen Needle (BD Pen Needle Love 2nd Gen) 32G X 4 MM MISC   • levalbuterol (XOPENEX) 1.25 mg/0.5 mL nebulizer solution   • naloxone (NARCAN) 4 mg/0.1 mL nasal spray   • semaglutide, 0.25 or 0.5 mg/dose, (Ozempic, 0.25 or 0.5 MG/DOSE,) 2 mg/3 mL injection pen Labs:  Results from last 7 days   Lab Units 08/12/23  0545 08/11/23  1746 08/11/23  1704   WBC Thousand/uL 12.68*  --  13.95*   HEMOGLOBIN g/dL 8.7*  --  9.2*   I STAT HEMOGLOBIN g/dl  --  10.9*  --    HEMATOCRIT % 31.1*  --  33.6*   HEMATOCRIT, ISTAT %  --  32*  --    PLATELETS Thousands/uL 300  --  350   NEUTROS PCT %  --   --  77*   LYMPHS PCT %  --   --  13*   MONOS PCT %  --   --  6   EOS PCT %  --   --  2     Results from last 7 days   Lab Units 08/12/23  0545 08/11/23  1746 08/11/23  1704   POTASSIUM mmol/L 4.2  --  4.9   CHLORIDE mmol/L 91*  --  91*   CO2 mmol/L 35*  --  39*   CO2, I-STAT mmol/L  --  44*  --    BUN mg/dL 13  --  11   CREATININE mg/dL 0.58*  --  0.59*   CALCIUM mg/dL 8.2*  --  8.0*   ALK PHOS U/L  --   --  119*   ALT U/L  --   --  18   AST U/L  --   --  28   GLUCOSE, ISTAT mg/dl  --  252*  --      Lab Results   Component Value Date    TROPONINI <0.02 06/03/2021    TROPONINI <0.02 06/03/2021    TROPONINI <0.02 06/03/2021    CKTOTAL 39 09/07/2017     Results from last 7 days   Lab Units 08/11/23  1704   INR  1.14     Lab Results   Component Value Date    BLOODCX Received in Microbiology Lab. Culture in Progress. 08/11/2023    BLOODCX Received in Microbiology Lab. Culture in Progress. 08/11/2023    BLOODCX No Growth After 5 Days. 06/03/2023    SPUTUMCULTUR 2+ Growth of 05/21/2020    SPUTUMCULTUR  05/21/2020     Commensal respiratory nicholas only; No significant growth of Staph aureus/MRSA or Pseudomonas aeruginosa. Imaging:  Results for orders placed during the hospital encounter of 08/11/23    X-ray chest 1 view portable    Narrative  CHEST    INDICATION:   sob. COMPARISON: Chest radiograph July 22, 2023    EXAM PERFORMED/VIEWS:  XR CHEST PORTABLE      FINDINGS:    Cardiomediastinal silhouette appears unremarkable. Mild prominence of the interstitial markings is similar to prior study. No pleural effusion. No pneumothorax.     Osseous structures appear within normal limits for patient age. Impression  Mild pulmonary vascular congestion is similar to July 22, 2023. Workstation performed: [id number]    Results for orders placed during the hospital encounter of 09/27/22    XR chest pa & lateral    Narrative  CHEST    INDICATION:   J44.9: Chronic obstructive pulmonary disease, unspecified  R06.02: Shortness of breath. COMPARISON:  6/5/2021    EXAM PERFORMED/VIEWS:  XR CHEST PA & LATERAL      FINDINGS:    Cardiomediastinal silhouette appears unremarkable. Mild arch calcifications again noted. Mild emphysematous changes are seen. Bilateral lower lobe hazy pulmonary infiltrates and/or subsegmental atelectasis noted. Upper to midlung fields appear adequately aerated and clear. No pneumothorax or pleural effusion. Osseous structures appear within normal limits for patient age. No free air in the upper abdomen. Impression  Mild emphysematous changes are seen. Bilateral lower lobe hazy pulmonary infiltrates and/or subsegmental atelectasis noted. Upper to midlung fields appear adequately aerated and clear.           Workstation performed: [id number]      Last 24 Hours Medication List:   Current Facility-Administered Medications   Medication Dose Route Frequency Provider Last Rate   • apixaban  5 mg Oral BID VICKY Benites     • atorvastatin  40 mg Oral Daily VICKY Benites     • budesonide  0.5 mg Nebulization Q12H VICKY Benites     • escitalopram  20 mg Oral Daily VICKY Benites     • fluticasone  1 spray Nasal BID VICKY Benites     • formoterol  20 mcg Nebulization Q12H VICKY Benites     • insulin glargine  10 Units Subcutaneous Once Theodore Daniels MD     • insulin glargine  40 Units Subcutaneous HS VICKY Benites     • insulin lispro  4-20 Units Subcutaneous TID AC VICKY Benites     • insulin lispro  4-20 Units Subcutaneous HS [place], CRNP     • ipratropium 0.5 mg Nebulization TID Jettie Satchel, CRNP     • levalbuterol  1.25 mg Nebulization TID Jettie Satchel, CRNP     • loratadine  10 mg Oral Daily Jettie Satchel, CRNP     • LORazepam  1 mg Oral HS Jettie Satchel, CRNP     • methylPREDNISolone sodium succinate  40 mg Intravenous [facility] Jettie Satchel, CRNP     • metoprolol succinate  100 mg Oral Daily Jettie Satchel, CRNP     • montelukast  10 mg Oral HS Jettie Satchel, CRNP     • pantoprazole  40 mg Oral Early Morning Jettie Satchel, CRNP     • torsemide  60 mg Oral BID Jettie Satchel, CRNP     • traZODone  150 mg Oral HS Jettie Satchel, CRNP          Today, Patient Was Seen By: Cristin Garcia MD    ** Please Note: Dictation voice to text software may have been used in the creation of this document.  **

## 2023-08-12 NOTE — PLAN OF CARE
Problem: PAIN - ADULT  Goal: Verbalizes/displays adequate comfort level or baseline comfort level  Description: Interventions:  - Encourage patient to monitor pain and request assistance  - Assess pain using appropriate pain scale  - Administer analgesics based on type and severity of pain and evaluate response  - Implement non-pharmacological measures as appropriate and evaluate response  - Consider cultural and social influences on pain and pain management  - Notify physician/advanced practitioner if interventions unsuccessful or patient reports new pain  Outcome: Progressing     Problem: INFECTION - ADULT  Goal: Absence or prevention of progression during hospitalization  Description: INTERVENTIONS:  - Assess and monitor for signs and symptoms of infection  - Monitor lab/diagnostic results  - Monitor all insertion sites, i.e. indwelling lines, tubes, and drains  - Monitor endotracheal if appropriate and nasal secretions for changes in amount and color  - [place] appropriate cooling/warming therapies per order  - Administer medications as ordered  - Instruct and encourage patient and family to use good hand hygiene technique  - Identify and instruct in appropriate isolation precautions for identified infection/condition  Outcome: Progressing     Problem: SAFETY ADULT  Goal: Patient will remain free of falls  Description: INTERVENTIONS:  - Educate patient/family on patient safety including physical limitations  - Instruct patient to call for assistance with activity   - Consult OT/PT to assist with strengthening/mobility   - Keep Call bell within reach  - Keep bed low and locked with side rails adjusted as appropriate  - Keep care items and personal belongings within reach  - Initiate and maintain comfort rounds  - Make Fall Risk Sign visible to staff  - Offer Toileting every 2 Hours, in advance of need  - Initiate/Maintain bed alarm  - Obtain necessary fall risk management equipment: yellow socks  - Apply yellow socks and bracelet for high fall risk patients  - Consider moving patient to room near nurses station  Outcome: Progressing  Goal: Maintain or return to baseline ADL function  Description: INTERVENTIONS:  -  Assess patient's ability to carry out ADLs; assess patient's baseline for ADL function and identify physical deficits which impact ability to perform ADLs (bathing, care of mouth/teeth, toileting, grooming, dressing, etc.)  - Assess/evaluate cause of self-care deficits   - Assess range of motion  - Assess patient's mobility; develop plan if impaired  - Assess patient's need for assistive devices and provide as appropriate  - Encourage maximum independence but intervene and supervise when necessary  - Involve family in performance of ADLs  - Assess for home care needs following discharge   - Consider OT consult to assist with ADL evaluation and planning for discharge  - Provide patient education as appropriate  Outcome: Progressing  Goal: Maintains/Returns to pre admission functional level  Description: INTERVENTIONS:  - Perform BMAT or MOVE assessment daily.   - Set and communicate daily mobility goal to care team and patient/family/caregiver. - Collaborate with rehabilitation services on mobility goals if consulted  - Perform Range of Motion 3 times a day. - Reposition patient every 2 hours.   - Dangle patient 3 times a day  - Stand patient 3 times a day  - Ambulate patient 3 times a day  - Out of bed to chair 2 times a day   - Out of bed for meals 2  Problem: DISCHARGE PLANNING  Goal: Discharge to home or other facility with appropriate resources  Description: INTERVENTIONS:  - Identify barriers to discharge w/patient and caregiver  - Arrange for needed discharge resources and transportation as appropriate  - Identify discharge learning needs (meds, wound care, etc.)  - Arrange for interpretive services to assist at discharge as needed  - Refer to Case Management Department for coordinating discharge planning if the patient needs post-hospital services based on physician/advanced practitioner order or complex needs related to functional status, cognitive ability, or social support system  Outcome: Progressing     Problem: Knowledge Deficit  Goal: Patient/family/caregiver demonstrates understanding of disease process, treatment plan, medications, and discharge instructions  Description: Complete learning assessment and assess knowledge base.   Interventions:  - Provide teaching at level of understanding  - Provide teaching via preferred learning methods  Outcome: Progressing     Problem: RESPIRATORY - ADULT  Goal: Achieves optimal ventilation and oxygenation  Description: INTERVENTIONS:  - Assess for changes in respiratory status  - Assess for changes in mentation and behavior  - Position to facilitate oxygenation and minimize respiratory effort  - Oxygen administered by appropriate delivery if ordered  - Initiate smoking cessation education as indicated  - Encourage broncho-pulmonary hygiene including cough, deep breathe, Incentive Spirometry  - Assess the need for suctioning and aspirate as needed  - Assess and instruct to report SOB or any respiratory difficulty  - Respiratory Therapy support as indicated  Outcome: Progressing     Problem: METABOLIC, FLUID AND ELECTROLYTES - ADULT  Goal: Electrolytes maintained within normal limits  Description: INTERVENTIONS:  - Monitor labs and assess patient for signs and symptoms of electrolyte imbalances  - Administer electrolyte replacement as ordered  - Monitor response to electrolyte replacements, including repeat lab results as appropriate  - Instruct patient on fluid and nutrition as appropriate  Outcome: Progressing  Goal: Fluid balance maintained  Description: INTERVENTIONS:  - Monitor labs   - Monitor I/O and WT  - Instruct patient on fluid and nutrition as appropriate  - Assess for signs & symptoms of volume excess or deficit  Outcome: Progressing  Goal: Glucose maintained within target range  Description: INTERVENTIONS:  - Monitor Blood Glucose as ordered  - Assess for signs and symptoms of hyperglycemia and hypoglycemia  - Administer ordered medications to maintain glucose within target range  - Assess nutritional intake and initiate nutrition service referral as needed  Outcome: Progressing    times a day  - Out of bed for toileting  - Record patient progress and toleration of activity level   Outcome: Progressing

## 2023-08-12 NOTE — ASSESSMENT & PLAN NOTE
• Home regimen: Eliquis, metoprolol  • Currently SR in hospital     Plan  • Continue Eliquis and metoprol

## 2023-08-12 NOTE — ASSESSMENT & PLAN NOTE
· Home regimen: Eliquis, metoprolol  · Currently SR in hospital    Plan  · Continue Eliquis and metoprol

## 2023-08-12 NOTE — ASSESSMENT & PLAN NOTE
• Home regimen: Albuterol, Bevespi, Budesonide, Benralalizumab, Montelukast, Xopenex  • Presented to ED from home via EMS for SOB since Tuesday. Patient states she has been noncompliant with nebulizer treatments and BIPAP HS at home since being discharged on 7/26/23. She says she has been compliant with inhalers, oral medications and CPAP HS. • In ED placed on BIPAP then weaned to 12L [address] after receiving MERCER neb, 125mg IV solumedrol and 2g IV magnesium  • ABG on 6L oxygen: pH 7.3, pCO2 64.8, pO2 65.3, HCO3 33.7  • Received IV azithromycin and ceftriaxone in ED  • Afebrile, procal 0.05     Plan  • IV Solumedrol 40mg Q8H   • Xopenex/Atrovent TID  • Pulmicort BID  • Performist BID   • Procalcitonin x2 is negative  • Will monitor off futher ABX.    • Respiratory protocol  • Pulmonology consult

## 2023-08-12 NOTE — ASSESSMENT & PLAN NOTE
Wt Readings from Last 3 Encounters:   08/11/23 132 kg (292 lb 1.8 oz)   07/26/23 132 kg (291 lb 9.6 oz)   06/08/23 129 kg (284 lb)     · Does not appear fluid overload on physical exam. No crackles.  No lower extremity edema  · BNP 73  · Received 60mg IV lasix in ED    Plan  · Hold further IV diuretics  · Continue home torsemide  · Strict I/O  · Daily weights
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
## 2023-08-12 NOTE — ASSESSMENT & PLAN NOTE
Wt Readings from Last 3 Encounters:   08/12/23 132 kg (292 lb 1.8 oz)   07/26/23 132 kg (291 lb 9.6 oz)   06/08/23 129 kg (284 lb)     • Does not appear fluid overload on physical exam. No crackles.  No lower extremity edema  • BNP 73  • Received 60mg IV lasix in ED     Plan  • Hold further IV diuretics  • Continue home torsemide  • Strict I/O  • Daily weights  · Trend BMP

## 2023-08-12 NOTE — ASSESSMENT & PLAN NOTE
• Home regimen: Albuterol, Bevespi, Budesonide, Benralalizumab, Montelukast, Xopenex  • Presented to ED from home via EMS for SOB since Tuesday. Patient states she has been noncompliant with nebulizer treatments and BIPAP HS at home since being discharged on 7/26/23. She says she has been compliant with inhalers, oral medications and CPAP HS. • In ED placed on BIPAP then weaned to 12L [address] after receiving MERCER neb, 125mg IV solumedrol and 2g IV magnesium  • ABG on 6L oxygen: pH 7.3, pCO2 64.8, pO2 65.3, HCO3 33.7  • Received IV azithromycin and ceftriaxone in ED  • Afebrile, procal 0.05     Plan  • IV Solumedrol 40mg Q8H   • Xopenex/Atrovent TID  • Pulmicort BID  • Performist BID   • Will monitor off futher ABX.    • Respiratory protocol  • Pulmonology consult

## 2023-08-13 DIAGNOSIS — N18.2 TYPE 2 DIABETES MELLITUS WITH STAGE 2 CHRONIC KIDNEY DISEASE, WITHOUT LONG-TERM CURRENT USE OF INSULIN (HCC): ICD-10-CM

## 2023-08-13 DIAGNOSIS — Z79.4 TYPE 2 DIABETES MELLITUS WITH STAGE 2 CHRONIC KIDNEY DISEASE, WITH LONG-TERM CURRENT USE OF INSULIN (HCC): ICD-10-CM

## 2023-08-13 DIAGNOSIS — E11.22 TYPE 2 DIABETES MELLITUS WITH STAGE 2 CHRONIC KIDNEY DISEASE, WITHOUT LONG-TERM CURRENT USE OF INSULIN (HCC): ICD-10-CM

## 2023-08-13 DIAGNOSIS — J44.9 ASTHMA-COPD OVERLAP SYNDROME (HCC): ICD-10-CM

## 2023-08-13 DIAGNOSIS — N18.2 TYPE 2 DIABETES MELLITUS WITH STAGE 2 CHRONIC KIDNEY DISEASE, WITH LONG-TERM CURRENT USE OF INSULIN (HCC): ICD-10-CM

## 2023-08-13 DIAGNOSIS — E11.22 TYPE 2 DIABETES MELLITUS WITH STAGE 2 CHRONIC KIDNEY DISEASE, WITH LONG-TERM CURRENT USE OF INSULIN (HCC): ICD-10-CM

## 2023-08-13 LAB
ALBUMIN SERPL BCP-MCNC: 3.9 G/DL (ref 3.5–5)
ALP SERPL-CCNC: 99 U/L (ref 34–104)
ALT SERPL W P-5'-P-CCNC: 14 U/L (ref 7–52)
ANION GAP SERPL CALCULATED.3IONS-SCNC: 9 MMOL/L
AST SERPL W P-5'-P-CCNC: 8 U/L (ref 13–39)
BILIRUB SERPL-MCNC: 0.32 MG/DL (ref 0.2–1)
BUN SERPL-MCNC: 20 MG/DL (ref 5–25)
CALCIUM SERPL-MCNC: 8.6 MG/DL (ref 8.4–10.2)
CHLORIDE SERPL-SCNC: 91 MMOL/L (ref 96–108)
CO2 SERPL-SCNC: 35 MMOL/L (ref 21–32)
CREAT SERPL-MCNC: 0.62 MG/DL (ref 0.6–1.3)
ERYTHROCYTE [DISTWIDTH] IN BLOOD BY AUTOMATED COUNT: 18.8 % (ref 11.6–15.1)
GFR SERPL CREATININE-BSD FRML MDRD: 95 ML/MIN/1.73SQ M
GLUCOSE SERPL-MCNC: 164 MG/DL (ref 65–140)
GLUCOSE SERPL-MCNC: 196 MG/DL (ref 65–140)
GLUCOSE SERPL-MCNC: 199 MG/DL (ref 65–140)
GLUCOSE SERPL-MCNC: 204 MG/DL (ref 65–140)
GLUCOSE SERPL-MCNC: 228 MG/DL (ref 65–140)
GLUCOSE SERPL-MCNC: 230 MG/DL (ref 65–140)
GLUCOSE SERPL-MCNC: 234 MG/DL (ref 65–140)
GLUCOSE SERPL-MCNC: 235 MG/DL (ref 65–140)
GLUCOSE SERPL-MCNC: 245 MG/DL (ref 65–140)
GLUCOSE SERPL-MCNC: 252 MG/DL (ref 65–140)
GLUCOSE SERPL-MCNC: 265 MG/DL (ref 65–140)
GLUCOSE SERPL-MCNC: 286 MG/DL (ref 65–140)
GLUCOSE SERPL-MCNC: 290 MG/DL (ref 65–140)
GLUCOSE SERPL-MCNC: 312 MG/DL (ref 65–140)
GLUCOSE SERPL-MCNC: 392 MG/DL (ref 65–140)
HCT VFR BLD AUTO: 29.1 % (ref 34.8–46.1)
HGB BLD-MCNC: 8.4 G/DL (ref 11.5–15.4)
MAGNESIUM SERPL-MCNC: 2.4 MG/DL (ref 1.9–2.7)
MCH RBC QN AUTO: 19.6 PG (ref 26.8–34.3)
MCHC RBC AUTO-ENTMCNC: 28.9 G/DL (ref 31.4–37.4)
MCV RBC AUTO: 68 FL (ref 82–98)
NRBC BLD AUTO-RTO: 0 /100 WBCS
PLATELET # BLD AUTO: 273 THOUSANDS/UL (ref 149–390)
PMV BLD AUTO: 11.1 FL (ref 8.9–12.7)
POTASSIUM SERPL-SCNC: 3.6 MMOL/L (ref 3.5–5.3)
PROCALCITONIN SERPL-MCNC: 0.06 NG/ML
PROT SERPL-MCNC: 6.2 G/DL (ref 6.4–8.4)
RBC # BLD AUTO: 4.28 MILLION/UL (ref 3.81–5.12)
SODIUM SERPL-SCNC: 135 MMOL/L (ref 135–147)
WBC # BLD AUTO: 12.62 THOUSAND/UL (ref 4.31–10.16)

## 2023-08-13 PROCEDURE — 94640 AIRWAY INHALATION TREATMENT: CPT

## 2023-08-13 PROCEDURE — 99232 SBSQ HOSP IP/OBS MODERATE 35: CPT | Performed by: INTERNAL MEDICINE

## 2023-08-13 PROCEDURE — 84145 PROCALCITONIN (PCT): CPT | Performed by: PHYSICIAN ASSISTANT

## 2023-08-13 PROCEDURE — 94760 N-INVAS EAR/PLS OXIMETRY 1: CPT

## 2023-08-13 PROCEDURE — 94660 CPAP INITIATION&MGMT: CPT

## 2023-08-13 PROCEDURE — 80053 COMPREHEN METABOLIC PANEL: CPT | Performed by: PHYSICIAN ASSISTANT

## 2023-08-13 PROCEDURE — 82948 REAGENT STRIP/BLOOD GLUCOSE: CPT

## 2023-08-13 PROCEDURE — 83735 ASSAY OF MAGNESIUM: CPT | Performed by: PHYSICIAN ASSISTANT

## 2023-08-13 PROCEDURE — 85027 COMPLETE CBC AUTOMATED: CPT | Performed by: PHYSICIAN ASSISTANT

## 2023-08-13 RX ORDER — FUROSEMIDE 10 MG/ML
40 INJECTION INTRAMUSCULAR; INTRAVENOUS
Status: DISCONTINUED | OUTPATIENT
Start: 2023-08-13 | End: 2023-08-14

## 2023-08-13 RX ORDER — FUROSEMIDE 10 MG/ML
40 INJECTION INTRAMUSCULAR; INTRAVENOUS
Status: DISCONTINUED | OUTPATIENT
Start: 2023-08-13 | End: 2023-08-13

## 2023-08-13 RX ORDER — INSULIN LISPRO 100 [IU]/ML
4 INJECTION, SOLUTION INTRAVENOUS; SUBCUTANEOUS ONCE
Status: COMPLETED | OUTPATIENT
Start: 2023-08-13 | End: 2023-08-13

## 2023-08-13 RX ORDER — DIPHENHYDRAMINE HYDROCHLORIDE 50 MG/ML
25 INJECTION INTRAMUSCULAR; INTRAVENOUS EVERY 6 HOURS PRN
Status: DISCONTINUED | OUTPATIENT
Start: 2023-08-13 | End: 2023-08-22 | Stop reason: HOSPADM

## 2023-08-13 RX ORDER — INSULIN LISPRO 100 [IU]/ML
2-12 INJECTION, SOLUTION INTRAVENOUS; SUBCUTANEOUS
Status: DISCONTINUED | OUTPATIENT
Start: 2023-08-14 | End: 2023-08-14

## 2023-08-13 RX ORDER — INSULIN LISPRO 100 [IU]/ML
2-12 INJECTION, SOLUTION INTRAVENOUS; SUBCUTANEOUS
Status: DISCONTINUED | OUTPATIENT
Start: 2023-08-14 | End: 2023-08-13

## 2023-08-13 RX ORDER — INSULIN LISPRO 100 [IU]/ML
2-12 INJECTION, SOLUTION INTRAVENOUS; SUBCUTANEOUS
Status: DISCONTINUED | OUTPATIENT
Start: 2023-08-13 | End: 2023-08-14

## 2023-08-13 RX ORDER — NYSTATIN 100000 [USP'U]/G
POWDER TOPICAL 2 TIMES DAILY
Status: DISCONTINUED | OUTPATIENT
Start: 2023-08-13 | End: 2023-08-22 | Stop reason: HOSPADM

## 2023-08-13 RX ORDER — INSULIN GLARGINE 100 [IU]/ML
35 INJECTION, SOLUTION SUBCUTANEOUS
Status: DISCONTINUED | OUTPATIENT
Start: 2023-08-13 | End: 2023-08-14

## 2023-08-13 RX ORDER — INSULIN LISPRO 100 [IU]/ML
10 INJECTION, SOLUTION INTRAVENOUS; SUBCUTANEOUS
Status: DISCONTINUED | OUTPATIENT
Start: 2023-08-14 | End: 2023-08-14

## 2023-08-13 RX ORDER — INSULIN LISPRO 100 [IU]/ML
1-5 INJECTION, SOLUTION INTRAVENOUS; SUBCUTANEOUS
Status: DISCONTINUED | OUTPATIENT
Start: 2023-08-13 | End: 2023-08-13

## 2023-08-13 RX ORDER — PREDNISONE 20 MG/1
40 TABLET ORAL DAILY
Status: COMPLETED | OUTPATIENT
Start: 2023-08-14 | End: 2023-08-16

## 2023-08-13 RX ORDER — METHYLPREDNISOLONE SODIUM SUCCINATE 40 MG/ML
40 INJECTION, POWDER, LYOPHILIZED, FOR SOLUTION INTRAMUSCULAR; INTRAVENOUS ONCE
Status: COMPLETED | OUTPATIENT
Start: 2023-08-13 | End: 2023-08-13

## 2023-08-13 RX ADMIN — IPRATROPIUM BROMIDE 0.5 MG: 0.5 SOLUTION RESPIRATORY (INHALATION) at 13:35

## 2023-08-13 RX ADMIN — IPRATROPIUM BROMIDE 0.5 MG: 0.5 SOLUTION RESPIRATORY (INHALATION) at 19:05

## 2023-08-13 RX ADMIN — SODIUM CHLORIDE 12 UNITS/HR: 9 INJECTION, SOLUTION INTRAVENOUS at 15:08

## 2023-08-13 RX ADMIN — IPRATROPIUM BROMIDE 0.5 MG: 0.5 SOLUTION RESPIRATORY (INHALATION) at 07:26

## 2023-08-13 RX ADMIN — BUDESONIDE 0.5 MG: 0.5 INHALANT ORAL at 07:26

## 2023-08-13 RX ADMIN — INSULIN GLARGINE 35 UNITS: 100 INJECTION, SOLUTION SUBCUTANEOUS at 19:19

## 2023-08-13 RX ADMIN — APIXABAN 5 MG: 5 TABLET, FILM COATED ORAL at 17:41

## 2023-08-13 RX ADMIN — BUDESONIDE 0.5 MG: 0.5 INHALANT ORAL at 19:05

## 2023-08-13 RX ADMIN — PANTOPRAZOLE SODIUM 40 MG: 40 TABLET, DELAYED RELEASE ORAL at 06:18

## 2023-08-13 RX ADMIN — APIXABAN 5 MG: 5 TABLET, FILM COATED ORAL at 09:33

## 2023-08-13 RX ADMIN — SODIUM CHLORIDE, SODIUM GLUCONATE, SODIUM ACETATE, POTASSIUM CHLORIDE, MAGNESIUM CHLORIDE, SODIUM PHOSPHATE, DIBASIC, AND POTASSIUM PHOSPHATE 50 ML/HR: .53; .5; .37; .037; .03; .012; .00082 INJECTION, SOLUTION INTRAVENOUS at 12:32

## 2023-08-13 RX ADMIN — NYSTATIN: 100000 POWDER TOPICAL at 12:25

## 2023-08-13 RX ADMIN — METHYLPREDNISOLONE SODIUM SUCCINATE 40 MG: 40 INJECTION, POWDER, FOR SOLUTION INTRAMUSCULAR; INTRAVENOUS at 17:41

## 2023-08-13 RX ADMIN — INSULIN LISPRO 4 UNITS: 100 INJECTION, SOLUTION INTRAVENOUS; SUBCUTANEOUS at 22:55

## 2023-08-13 RX ADMIN — TORSEMIDE 60 MG: 20 TABLET ORAL at 09:34

## 2023-08-13 RX ADMIN — INSULIN LISPRO 4 UNITS: 100 INJECTION, SOLUTION INTRAVENOUS; SUBCUTANEOUS at 21:38

## 2023-08-13 RX ADMIN — SODIUM CHLORIDE 8 UNITS/HR: 9 INJECTION, SOLUTION INTRAVENOUS at 04:46

## 2023-08-13 RX ADMIN — METHYLPREDNISOLONE SODIUM SUCCINATE 40 MG: 40 INJECTION, POWDER, FOR SOLUTION INTRAMUSCULAR; INTRAVENOUS at 06:18

## 2023-08-13 RX ADMIN — LEVALBUTEROL HYDROCHLORIDE 1.25 MG: 1.25 SOLUTION RESPIRATORY (INHALATION) at 19:05

## 2023-08-13 RX ADMIN — ACETAMINOPHEN 325MG 650 MG: 325 TABLET ORAL at 09:30

## 2023-08-13 RX ADMIN — NYSTATIN: 100000 POWDER TOPICAL at 17:42

## 2023-08-13 RX ADMIN — LEVALBUTEROL HYDROCHLORIDE 1.25 MG: 1.25 SOLUTION RESPIRATORY (INHALATION) at 13:35

## 2023-08-13 RX ADMIN — FUROSEMIDE 40 MG: 10 INJECTION, SOLUTION INTRAMUSCULAR; INTRAVENOUS at 17:40

## 2023-08-13 RX ADMIN — FORMOTEROL FUMARATE DIHYDRATE 20 MCG: 20 SOLUTION RESPIRATORY (INHALATION) at 19:05

## 2023-08-13 RX ADMIN — MONTELUKAST 10 MG: 10 TABLET, FILM COATED ORAL at 21:38

## 2023-08-13 RX ADMIN — LEVALBUTEROL HYDROCHLORIDE 1.25 MG: 1.25 SOLUTION RESPIRATORY (INHALATION) at 07:26

## 2023-08-13 RX ADMIN — FLUTICASONE PROPIONATE 1 SPRAY: 50 SPRAY, METERED NASAL at 09:36

## 2023-08-13 RX ADMIN — FORMOTEROL FUMARATE DIHYDRATE 20 MCG: 20 SOLUTION RESPIRATORY (INHALATION) at 07:26

## 2023-08-13 RX ADMIN — LORATADINE 10 MG: 10 TABLET ORAL at 09:33

## 2023-08-13 RX ADMIN — FUROSEMIDE 40 MG: 10 INJECTION, SOLUTION INTRAMUSCULAR; INTRAVENOUS at 12:25

## 2023-08-13 RX ADMIN — LORAZEPAM 1 MG: 1 TABLET ORAL at 21:38

## 2023-08-13 RX ADMIN — METOPROLOL SUCCINATE 100 MG: 50 TABLET, EXTENDED RELEASE ORAL at 09:32

## 2023-08-13 RX ADMIN — TRAZODONE HYDROCHLORIDE 150 MG: 150 TABLET ORAL at 21:38

## 2023-08-13 RX ADMIN — ATORVASTATIN CALCIUM 40 MG: 40 TABLET, FILM COATED ORAL at 09:32

## 2023-08-13 RX ADMIN — ESCITALOPRAM OXALATE 20 MG: 20 TABLET ORAL at 09:33

## 2023-08-13 RX ADMIN — FLUTICASONE PROPIONATE 1 SPRAY: 50 SPRAY, METERED NASAL at 21:39

## 2023-08-13 NOTE — PROGRESS NOTES
Progress Note - Pulmonary   Mattieaugie Alejandro 57 y.o. female MRN: [id number]  Unit/Bed#: -01 Encounter: [id number]    Assessment/Plan:  Patient is a 39-year-old female with past medical history significant for pulmonary hypertension, severe COPD, iron deficiency anemia who presented with shortness of breath in setting of acute exacerbation of COPD. The patient has had multiple admissions. We will attempt to correct anemia as this may be an inciting factor. Otherwise, patient encouraged to take her inhalers/nebulizers as directed which she notes she did not do prior to this admission. Acute on chronic hypoxemic respiratory failure   -  On 4L NC at home  -  Currently on 4-5L NC  -  Check ambulatory pulse ox prior to discharge              -  May actually need more oxygen     Severe pulmonary hypertension  -  Reports compliance but does not weigh herself  -  RHC in 9/30/2021 consistent with group 2     Severe COPD with acute exacerbation  - weaned of bipap; may use qhs 16/8 40%  -  Continue pulmicort/perforomist; atrovent/xopenex  -  solumedrol BID   -  Transitioned to prednisone tomorrow   -  Blood glucose elevated     Chronic iron deficiency anemia  -  Severe iron deficiency  -  Was previously on home iron  -  Consider IV iron x 3 days   -  Would consider adding tylenol/benadryl given reported previous reaction     Afib  -  On metoprolol and eliquis  -  Rate controlled     Diastolic heart failure  -  On demedex  -  Check daily standing weights    Plan of care discussed with CODI. Chief Complaint:   Rough night    Subjective:   No acute events. Patient reports that despite a rough night her breathing has improved. She denies fevers, chills, nausea or vomiting. Her primary plaint is related to cough. Objective:     Vitals: Blood pressure 110/57, pulse 90, temperature (!) 97.2 °F (36.2 °C), resp. rate 15, height 5' 3" (1.6 m), weight 135 kg (298 lb 3.2 oz), SpO2 92 %, not currently breastfeeding. ,Body mass index is 52.82 kg/m². Intake/Output Summary (Last 24 hours) at 8/13/2023 1141  Last data filed at 8/13/2023 0815  Gross per 24 hour   Intake 3253.66 ml   Output 3600 ml   Net -346.34 ml       Invasive Devices     Peripheral Intravenous Line  Duration           Peripheral IV 08/12/23 Left Forearm <1 day                Physical Exam  Vitals and nursing note reviewed. Constitutional:       General: She is not in acute distress. Appearance: She is well-developed. She is obese. HENT:      Head: Normocephalic and atraumatic. Eyes:      Conjunctiva/sclera: Conjunctivae normal.   Cardiovascular:      Rate and Rhythm: Normal rate and regular rhythm. Heart sounds: No murmur heard. Pulmonary:      Effort: Pulmonary effort is normal. No respiratory distress. Breath sounds: Decreased breath sounds present. No wheezing, rhonchi or rales. Chest:      Chest wall: No tenderness. Abdominal:      Palpations: Abdomen is soft. Tenderness: There is no abdominal tenderness. Musculoskeletal:         General: No swelling. Cervical back: Neck supple. Skin:     General: Skin is warm and dry. Capillary Refill: Capillary refill takes less than 2 seconds. Neurological:      General: No focal deficit present. Mental Status: She is alert and oriented to person, place, and time. Psychiatric:         Mood and Affect: Mood normal. Mood is not anxious. Behavior: Behavior normal. Behavior is not agitated. Labs: I have personally reviewed pertinent lab results.    Lab Results   Component Value Date    SODIUM 135 08/13/2023    K 3.6 08/13/2023    CL 91 (L) 08/13/2023    CO2 35 (H) 08/13/2023    BUN 20 08/13/2023    CREATININE 0.62 08/13/2023    GLUC 196 (H) 08/13/2023    CALCIUM 8.6 08/13/2023     Lab Results   Component Value Date    WBC 12.62 (H) 08/13/2023    HGB 8.4 (L) 08/13/2023    HCT 29.1 (L) 08/13/2023    MCV 68 (L) 08/13/2023     08/13/2023       Imaging and other studies: I have personally reviewed pertinent reports. and I have personally reviewed pertinent films in PACS  CXR 8/11/2023:  Cardiomediastinal silhouette appears unremarkable. Mild prominence of the interstitial markings is similar to prior study. No pleural effusion. No pneumothorax. Osseous structures appear within normal limits for patient age.

## 2023-08-13 NOTE — ASSESSMENT & PLAN NOTE
Lab Results   Component Value Date    HGBA1C 9.9 (H) 05/11/2023       Recent Labs     08/13/23  0301 08/13/23  0518 08/13/23  0725 08/13/23  0917   POCGLU 228* 199* 164* 392*       Blood Sugar Average: Last 72 hrs:  (P) [id number]   • Home regimen: Metformin, Glargine 30 units daily at bedtime, glulisine 24 units with breakfast, 12 units with lunch and dinner     SSI; Subcutaneous Insulin Order Set  Blood Glucose checks TIDWM and QHS (Q6H for NPO patients)  Hold oral medications  Blood Glucose goal while inpatient is 140-180  Reduce basal insulin by 25-50% while inpatient  Consistent Carbohydrate Diet  -- Blood glucose levels highly elevated; likely secondary to high-dose steroids.   Currently on insulin drip  -- We will give patient's home dose of Lantus tonight, attempt to wean off insulin drip prior to bedtime

## 2023-08-13 NOTE — ASSESSMENT & PLAN NOTE
• Home regimen: Albuterol, Bevespi, Budesonide, Benralalizumab, Montelukast, Xopenex  • Presented to ED from home via EMS for SOB since Tuesday. Patient states she has been noncompliant with nebulizer treatments and BIPAP HS at home since being discharged on 7/26/23. She says she has been compliant with inhalers, oral medications and CPAP HS. • In ED placed on BIPAP then weaned to 12L [address] after receiving MERCER neb, 125mg IV solumedrol and 2g IV magnesium  • ABG on 6L oxygen: pH 7.3, pCO2 64.8, pO2 65.3, HCO3 33.7  • Received IV azithromycin and ceftriaxone in ED  • Afebrile, procal 0.05     Plan  • IV Solumedrol 40mg BID --transition to oral prednisone daily on 8/14 if continues to improve  • Xopenex/Atrovent TID  • Pulmicort BID  • Performist BID   • Procalcitonin x2 is negative  • Will monitor off futher ABX.    • Respiratory protocol  • Pulmonology consult

## 2023-08-13 NOTE — ASSESSMENT & PLAN NOTE
Wt Readings from Last 3 Encounters:   08/13/23 135 kg (298 lb 3.2 oz)   07/26/23 132 kg (291 lb 9.6 oz)   06/08/23 129 kg (284 lb)     • Patient is currently 14 pounds over her most recent outpatient weight  • Pitting edema present; lung field auscultation difficult given body habitus  • BNP 73  • Received 60mg IV lasix in ED  • On admission, was placed back on torsemide 60 mg twice daily p.o.   • While patient does not look tremendously fluid overloaded, we will trial 2 days of IV Lasix and reevaluate  • ins and outs, daily weights     Plan  • Low-sodium diabetic diet  • Trial of IV Lasix  • Strict I/O  • Daily weights  · Trend BMP

## 2023-08-13 NOTE — PLAN OF CARE
Problem: PAIN - ADULT  Goal: Verbalizes/displays adequate comfort level or baseline comfort level  Description: Interventions:  - Encourage patient to monitor pain and request assistance  - Assess pain using appropriate pain scale  - Administer analgesics based on type and severity of pain and evaluate response  - Implement non-pharmacological measures as appropriate and evaluate response  - Consider cultural and social influences on pain and pain management  - Notify physician/advanced practitioner if interventions unsuccessful or patient reports new pain  Outcome: Progressing     Problem: INFECTION - ADULT  Goal: Absence or prevention of progression during hospitalization  Description: INTERVENTIONS:  - Assess and monitor for signs and symptoms of infection  - Monitor lab/diagnostic results  - Monitor all insertion sites, i.e. indwelling lines, tubes, and drains  - Monitor endotracheal if appropriate and nasal secretions for changes in amount and color  - [place] appropriate cooling/warming therapies per order  - Administer medications as ordered  - Instruct and encourage patient and family to use good hand hygiene technique  - Identify and instruct in appropriate isolation precautions for identified infection/condition  Outcome: Progressing     Problem: SAFETY ADULT  Goal: Patient will remain free of falls  Description: INTERVENTIONS:  - Educate patient/family on patient safety including physical limitations  - Instruct patient to call for assistance with activity   - Consult OT/PT to assist with strengthening/mobility   - Keep Call bell within reach  - Keep bed low and locked with side rails adjusted as appropriate  - Keep care items and personal belongings within reach  - Initiate and maintain comfort rounds  - Make Fall Risk Sign visible to staff  - Offer Toileting every 2 Hours, in advance of need  - Initiate/Maintain alarm  - Obtain necessary fall risk management equipment  - Apply yellow socks and bracelet for high fall risk patients  - Consider moving patient to room near nurses station  Outcome: Progressing  Goal: Maintain or return to baseline ADL function  Description: INTERVENTIONS:  -  Assess patient's ability to carry out ADLs; assess patient's baseline for ADL function and identify physical deficits which impact ability to perform ADLs (bathing, care of mouth/teeth, toileting, grooming, dressing, etc.)  - Assess/evaluate cause of self-care deficits   - Assess range of motion  - Assess patient's mobility; develop plan if impaired  - Assess patient's need for assistive devices and provide as appropriate  - Encourage maximum independence but intervene and supervise when necessary  - Involve family in performance of ADLs  - Assess for home care needs following discharge   - Consider OT consult to assist with ADL evaluation and planning for discharge  - Provide patient education as appropriate  Outcome: Progressing  Goal: Maintains/Returns to pre admission functional level  Description: INTERVENTIONS:  - Perform BMAT or MOVE assessment daily.   - Set and communicate daily mobility goal to care team and patient/family/caregiver. - Collaborate with rehabilitation services on mobility goals if consulted  - Perform Range of Motion 3 times a day. - Reposition patient every 3 hours.   - Dangle patient 3 times a day  - Stand patient 3 times a day  - Ambulate patient 3 times a day  - Out of bed to chair 3 times a day   - Out of bed for meals 3 times a day  - Out of bed for toileting  - Record patient progress and toleration of activity level   Outcome: Progressing     Problem: DISCHARGE PLANNING  Goal: Discharge to home or other facility with appropriate resources  Description: INTERVENTIONS:  - Identify barriers to discharge w/patient and caregiver  - Arrange for needed discharge resources and transportation as appropriate  - Identify discharge learning needs (meds, wound care, etc.)  - Arrange for interpretive services to assist at discharge as needed  - Refer to Case Management Department for coordinating discharge planning if the patient needs post-hospital services based on physician/advanced practitioner order or complex needs related to functional status, cognitive ability, or social support system  Outcome: Progressing     Problem: Knowledge Deficit  Goal: Patient/family/caregiver demonstrates understanding of disease process, treatment plan, medications, and discharge instructions  Description: Complete learning assessment and assess knowledge base.   Interventions:  - Provide teaching at level of understanding  - Provide teaching via preferred learning methods  Outcome: Progressing     Problem: RESPIRATORY - ADULT  Goal: Achieves optimal ventilation and oxygenation  Description: INTERVENTIONS:  - Assess for changes in respiratory status  - Assess for changes in mentation and behavior  - Position to facilitate oxygenation and minimize respiratory effort  - Oxygen administered by appropriate delivery if ordered  - Initiate smoking cessation education as indicated  - Encourage broncho-pulmonary hygiene including cough, deep breathe, Incentive Spirometry  - Assess the need for suctioning and aspirate as needed  - Assess and instruct to report SOB or any respiratory difficulty  - Respiratory Therapy support as indicated  Outcome: Progressing     Problem: METABOLIC, FLUID AND ELECTROLYTES - ADULT  Goal: Electrolytes maintained within normal limits  Description: INTERVENTIONS:  - Monitor labs and assess patient for signs and symptoms of electrolyte imbalances  - Administer electrolyte replacement as ordered  - Monitor response to electrolyte replacements, including repeat lab results as appropriate  - Instruct patient on fluid and nutrition as appropriate  Outcome: Progressing  Goal: Fluid balance maintained  Description: INTERVENTIONS:  - Monitor labs   - Monitor I/O and WT  - Instruct patient on fluid and nutrition as appropriate  - Assess for signs & symptoms of volume excess or deficit  Outcome: Progressing  Goal: Glucose maintained within target range  Description: INTERVENTIONS:  - Monitor Blood Glucose as ordered  - Assess for signs and symptoms of hyperglycemia and hypoglycemia  - Administer ordered medications to maintain glucose within target range  - Assess nutritional intake and initiate nutrition service referral as needed  Outcome: Progressing

## 2023-08-13 NOTE — MALNUTRITION/BMI
This medical record reflects one or more clinical indicators suggestive of malnutrition and/or morbid obesity. Malnutrition Findings:                                 BMI Findings:  Adult BMI Classifications: Morbid Obesity 50-59.9        Body mass index is 52.82 kg/m². See Nutrition note dated 08/13/2023 for additional details. Completed nutrition assessment is viewable in the nutrition documentation.

## 2023-08-13 NOTE — ASSESSMENT & PLAN NOTE
• Chronically 4L oxygen NC  • In ED placed on BIPAP then weaned to 12L [address] after receiving MERCER neb, 125mg IV solumedrol and 2g IV magnesium  • ABG on 6L oxygen: pH 7.3, pCO2 64.8, pO2 65.3, HCO3 33.7  • Received IV azithromycin and ceftriaxone in ED     Plan  • Consult Pulmonology-appreciate recommendations, will add IV iron x 3 days  • Respiratory protocol  • Wean oxygen to baseline as able  • Patient is currently 14 pounds over recent outpatient weight, 1+ pitting edema bilateral lower extremities. Chest auscultation difficult given body habitus, we will trial 2 days of gentle IV diuresis to improve fluid status.

## 2023-08-13 NOTE — PROGRESS NOTES
[facility]  Progress Note  Name: Vadim De Leon  MRN: [id number]  Unit/Bed#: -01 I Date of Admission: 8/11/2023   Date of Service: 8/13/2023 I Hospital Day: 2    Assessment/Plan   Class 3 severe obesity in adult [facility])  Assessment & Plan  • Encourage weight loss and lifestyle changes    Lactic acidosis  Assessment & Plan  • LA 2.4->2. 6     Plan  • Continue to trend    Chronic diastolic congestive heart failure (HCC)  Assessment & Plan  Wt Readings from Last 3 Encounters:   08/13/23 135 kg (298 lb 3.2 oz)   07/26/23 132 kg (291 lb 9.6 oz)   06/08/23 129 kg (284 lb)     • Patient is currently 14 pounds over her most recent outpatient weight  • Pitting edema present; lung field auscultation difficult given body habitus  • BNP 73  • Received 60mg IV lasix in ED  • On admission, was placed back on torsemide 60 mg twice daily p.o. • While patient does not look tremendously fluid overloaded, we will trial 2 days of IV Lasix and reevaluate  • ins and outs, daily weights     Plan  • Low-sodium diabetic diet  • Trial of IV Lasix  • Strict I/O  • Daily weights  · Trend BMP    Paroxysmal atrial fibrillation (HCC)  Assessment & Plan  • Home regimen: Eliquis, metoprolol  • Currently SR in hospital     Plan  • Continue Eliquis and metoprol    Acute on chronic respiratory failure with hypoxia (HCC)  Assessment & Plan  • Chronically 4L oxygen NC  • In ED placed on BIPAP then weaned to 12L [address] [address] after receiving MERCER neb, 125mg IV solumedrol and 2g IV magnesium  • ABG on 6L oxygen: pH 7.3, pCO2 64.8, pO2 65.3, HCO3 33.7  • Received IV azithromycin and ceftriaxone in ED     Plan  • Consult Pulmonology-appreciate recommendations, will add IV iron x 3 days  • Respiratory protocol  • Wean oxygen to baseline as able  • Patient is currently 14 pounds over recent outpatient weight, 1+ pitting edema bilateral lower extremities.   Chest auscultation difficult given body habitus, we will trial 2 days of gentle IV diuresis to improve fluid status. Type 2 diabetes mellitus with stage 2 chronic kidney disease, with long-term current use of insulin [facility])  Assessment & Plan  Lab Results   Component Value Date    HGBA1C 9.9 (H) 05/11/2023       Recent Labs     08/13/23  0301 08/13/23  0518 08/13/23  0725 08/13/23  0917   POCGLU 228* 199* 164* 392*       Blood Sugar Average: Last 72 hrs:  (P) [id number]   • Home regimen: Metformin, Glargine 30 units daily at bedtime, glulisine 24 units with breakfast, 12 units with lunch and dinner     SSI; Subcutaneous Insulin Order Set  Blood Glucose checks TIDWM and QHS (Q6H for NPO patients)  Hold oral medications  Blood Glucose goal while inpatient is 140-180  Reduce basal insulin by 25-50% while inpatient  Consistent Carbohydrate Diet  -- Blood glucose levels highly elevated; likely secondary to high-dose steroids. Currently on insulin drip  -- We will give patient's home dose of Lantus tonight, attempt to wean off insulin drip prior to bedtime    Obstructive sleep apnea  Assessment & Plan  • Discharged 7/26 with Rx for nocturnal BIPAP  • Patient states she is noncompliant with BIPAP due to personal preference at home and continues to wear CPAP      Plan  • Tolerated BIPAP in hospital -->continue BIPAP HS    * Asthma with COPD with exacerbation ([address])  Assessment & Plan  • Home regimen: Albuterol, Bevespi, Budesonide, Benralalizumab, Montelukast, Xopenex  • Presented to ED from home via EMS for SOB since Tuesday. Patient states she has been noncompliant with nebulizer treatments and BIPAP HS at home since being discharged on 7/26/23. She says she has been compliant with inhalers, oral medications and CPAP HS.    • In ED placed on BIPAP then weaned to 12L [address] [address] after receiving MERCER neb, 125mg IV solumedrol and 2g IV magnesium  • ABG on 6L oxygen: pH 7.3, pCO2 64.8, pO2 65.3, HCO3 33.7  • Received IV azithromycin and ceftriaxone in ED  • Afebrile, procal 0.05     Plan  • IV Solumedrol 40mg BID --transition to oral prednisone daily on 8/14 if continues to improve  • Xopenex/Atrovent TID  • Pulmicort BID  • Performist BID   • Procalcitonin x2 is negative  • Will monitor off futher ABX. • Respiratory protocol  • Pulmonology consult           VTE Pharmacologic Prophylaxis:   Pharmacologic: Apixaban (Eliquis)  Mechanical VTE Prophylaxis in Place: Yes    Patient Centered Rounds: I have performed bedside rounds with nursing staff today. Discussions with Specialists or Other Care Team Provider: Pulmonology    Education and Discussions with Family / Patient: Care plan discussed with patient who voiced understanding and agrees with recommendations. Time Spent for Care: 45 minutes. More than 50% of total time spent on counseling and coordination of care as described above. Current Length of Stay: 2 day(s)    Current Patient Status: Inpatient   Certification Statement: The patient will continue to require additional inpatient hospital stay due to Acute respiratory failure    Discharge Plan: To be determined    Code Status: Level 1 - Full Code      Subjective:   Patient seen and examined bedside, no acute distress but does report some shortness of breath. Normally on 4 L nasal cannula at home, on exam was initially on 4 L, but pursing her lips and breathing heavy. Put her back on 5 L and she appeared to do better. Transition to prednisone 40 mg twice daily today and likely oral prednisone tomorrow. Continue current breathing treatments and appreciate pulmonology recommendations. Unfortunately patient is difficult to control diabetes and with IV prednisone, this makes it even more difficult. On insulin drip throughout the day, will give 35 units Lantus tonight in hopes that pump can be discontinued and patient started back on home dose insulin and sliding scale tomorrow.   Overall showing improvement, but believes that heart failure may be component as patient is currently 15 pounds over her most recent outpatient weight and has pitting edema bilateral lower extremities. Furthermore, chest x-ray shows mild pulmonary vascular congestion. We will trial IV Lasix today and tomorrow; can transition to outpatient torsemide dosages once trial complete. Pulmonology also recommending IV iron and will initiate tomorrow morning with appropriate allergy medications. Objective:     Vitals:   Temp (24hrs), Av °F (36.7 °C), Min:97.2 °F (36.2 °C), Max:98.6 °F (37 °C)    Temp:  [97.2 °F (36.2 °C)-98.6 °F (37 °C)] 98.2 °F (36.8 °C)  HR:  [75-90] 84  Resp:  [15-22] 15  BP: (106-134)/(55-70) 134/70  SpO2:  [87 %-99 %] 94 %  Body mass index is 52.82 kg/m². Input and Output Summary (last 24 hours): Intake/Output Summary (Last 24 hours) at 2023 1853  Last data filed at 2023 1801  Gross per 24 hour   Intake 2513.66 ml   Output 2450 ml   Net 63.66 ml       Physical Exam:     Physical Exam  Vitals and nursing note reviewed. Constitutional:       General: She is not in acute distress. Appearance: She is well-developed. She is obese. HENT:      Head: Normocephalic and atraumatic. Eyes:      Conjunctiva/sclera: Conjunctivae normal.   Cardiovascular:      Rate and Rhythm: Normal rate and regular rhythm. Heart sounds: No murmur heard. Pulmonary:      Effort: No respiratory distress. Comments: Breath sounds difficult to assess given patient habitus  Abdominal:      Palpations: Abdomen is soft. Tenderness: There is no abdominal tenderness. Musculoskeletal:         General: No swelling. Cervical back: Neck supple. Right lower leg: Edema present. Left lower leg: Edema present. Skin:     General: Skin is warm and dry. Neurological:      Mental Status: She is alert and oriented to person, place, and time.    Psychiatric:         Mood and Affect: Mood normal.           Additional Data:     Labs:    Results from last 7 days   Lab Units 23  0590 08/12/23  0545 08/11/23  1746 08/11/23  1704   WBC Thousand/uL 12.62* 12.68*  --  13.95*   HEMOGLOBIN g/dL 8.4* 8.7*  --  9.2*   I STAT HEMOGLOBIN   --   --    < >  --    HEMATOCRIT % 29.1* 31.1*  --  33.6*   HEMATOCRIT, ISTAT   --   --    < >  --    PLATELETS Thousands/uL 273 300  --  350   NEUTROS PCT %  --   --   --  77*   LYMPHS PCT %  --   --   --  13*   LYMPHO PCT %  --  4*  --   --    MONOS PCT %  --   --   --  6   MONO PCT %  --  1*  --   --    EOS PCT %  --  1  --  2    < > = values in this interval not displayed. Results from last 7 days   Lab Units 08/13/23  0513   SODIUM mmol/L 135   POTASSIUM mmol/L 3.6   CHLORIDE mmol/L 91*   CO2 mmol/L 35*   BUN mg/dL 20   CREATININE mg/dL 0.62   ANION GAP mmol/L 9   CALCIUM mg/dL 8.6   ALBUMIN g/dL 3.9   TOTAL BILIRUBIN mg/dL 0.32   ALK PHOS U/L 99   ALT U/L 14   AST U/L 8*   GLUCOSE RANDOM mg/dL 196*     Results from last 7 days   Lab Units 08/11/23  1704   INR  1.14     Results from last 7 days   Lab Units 08/13/23  1718 08/13/23  1554 08/13/23  1430 08/13/23  1124 08/13/23  0917 08/13/23  0725 08/13/23  0518 08/13/23  0301 08/13/23  0219 08/13/23  0113 08/13/23  0003 08/12/23  2301   POC GLUCOSE mg/dl 230* 245* 265* 312* 392* 164* 199* 228* 252* 234* 286* 363*         Results from last 7 days   Lab Units 08/13/23  0513 08/12/23  0545 08/12/23  0111 08/11/23  2217 08/11/23  1950 08/11/23  1704   LACTIC ACID mmol/L  --   --  2.6* 3.0* 2.6* 2.4*   PROCALCITONIN ng/ml 0.06 0.05  --   --   --  0.05           * I Have Reviewed All Lab Data Listed Above. * Additional Pertinent Lab Tests Reviewed: All Labs Within Last 24 Hours Reviewed    Imaging:    Imaging Reports Reviewed Today Include: Chest x-ray  Imaging Personally Reviewed by Myself Includes:      Recent Cultures (last 7 days):     Results from last 7 days   Lab Units 08/11/23  1741 08/11/23  1704   BLOOD CULTURE  No Growth at 24 hrs. No Growth at 24 hrs.        Last 24 Hours Medication List:   Current Facility-Administered Medications   Medication Dose Route Frequency Provider Last Rate   • acetaminophen  650 mg Oral Q6H PRN Chance Wen PA-C     • apixaban  5 mg Oral BID Chance Wen PA-C     • atorvastatin  40 mg Oral Daily Chance Wen PA-C     • budesonide  0.5 mg Nebulization Q12H Chance Wen PA-C     • escitalopram  20 mg Oral Daily Chance Wen PA-C     • fluticasone  1 spray Nasal BID Chance Wen PA-C     • formoterol  20 mcg Nebulization Q12H Chance Wen PA-C     • furosemide  40 mg Intravenous BID (diuretic) Genesis Singer MD     • insulin glargine  35 Units Subcutaneous HS Genesis Singer MD     • insulin regular (HumuLIN R,NovoLIN R) 1 Units/mL in sodium chloride 0.9 % 100 mL infusion  0.3-21 Units/hr Intravenous Titrated Chance Wen PA-C 14 Units/hr (08/13/23 1746)   • ipratropium  0.5 mg Nebulization TID Chance Wen PA-C     • levalbuterol  1.25 mg Nebulization TID Chance Wen PA-C     • loratadine  10 mg Oral Daily Chance Wen PA-C     • LORazepam  1 mg Oral HS Chance Wen PA-C     • metoprolol succinate  100 mg Oral Daily Chance Wen PA-C     • montelukast  10 mg Oral HS Chance Wen PA-C     • multi-electrolyte  50 mL/hr Intravenous Continuous Chance Wen PA-C 50 mL/hr (08/13/23 1232)   • nystatin   Topical BID Genesis Singer MD     • pantoprazole  40 mg Oral Early Morning Chance Wen PA-C     • [START ON 8/14/2023] predniSONE  40 mg Oral Daily Genesis Singer MD     • traZODone  150 mg Oral HS Chance Wen PA-C          Today, Patient Was Seen By: Sarah Kothari MD    ** Please Note: Dictation voice to text software may have been used in the creation of this document.  **

## 2023-08-13 NOTE — UTILIZATION REVIEW
Initial Clinical Review    Admission: Date/Time/Statement:   Admission Orders (From admission, onward)     Ordered        08/11/23 1923  INPATIENT ADMISSION  Once                      Orders Placed This Encounter   Procedures   • INPATIENT ADMISSION     Standing Status:   Standing     Number of Occurrences:   1     Order Specific Question:   Level of Care     Answer:   Level 2 Stepdown / HOT [14]     Order Specific Question:   Estimated length of stay     Answer:   More than 2 Midnights     Order Specific Question:   Certification     Answer:   I certify that inpatient services are medically necessary for this patient for a duration of greater than two midnights. See H&P and MD Progress Notes for additional information about the patient's course of treatment. ED Arrival Information     Expected   -    Arrival   8/11/2023 16:38    Acuity   Emergent            Means of arrival   Ambulance    Escorted by   [facility]    Service   Hospitalist    Admission type   Emergency            Arrival complaint   SOB           Chief Complaint   Patient presents with   • Shortness of Breath     Pt to er via ems from home with complaints of shortness of breath for the past few days. Had contact with family member who has covid. Associated with nausea, denies chest pain. Chronically wear 4L NC for COPD       Initial Presentation: 57 y.o. female presents to the ED via EMS from home with c/o SOB. Has not had power since 8/15 and has not had nebs and BIPAP. PMH: COPD, asthma, PAF, diastolic HF, obesity, DM2, on home oxygen at 4L NC. In the ED she was on Oxygen w/ [address] and then BIPAP. Labs - leukocytosis, initial glucose 269 then increased to max 574, lactic acidosis. Imaging - mild pulm vascular congestion. Treated with nebs, IV Mag, SoluMedrol, antibiotics, Lasix, IV fluids. On exam tachypnea, decreased breath sounds BLL.   She is admitted to INPATIENT status with to Level 2 SD with Acute on chronic respiratory failure with hypoxia - Pulmonary consult, wean oxygen as able. Asthma w/ COPD exacerbation - IV SoluMedrol 40 mg q 8 hr, nebs, inhalers, monitor off antibiotics. Lactic acidosis - trend. Chronic dHF - holding further IV diuretics, continue home Torsemide, daily wt. WILMA on BIPAP - HS BIPAP. Date: 8/12   Day 2:   Continues on IV steroids, now 40 mg q 8 hr, off antibiotics. Started on IV Insulin drip and IV fluids. Is on oxygen BIPAP at [place], [address] and then 5 L NC today down from 10L [address]. She does have SOB and decreased breath sounds on exam.  Continue oxygen wean. Will start IV Venofer x 3 days. 8/12 Pulmonary Consult - recent multiple admits for COPD [place], now presents after power outage in [place]. Acute on chronic hypoxic resp failure - 6L NC oxygen. Educated on need to continue home meds. Has increased oxygen need currently, needs ambulatory pulse ox prior to d/c. Severe pulm HTN - does not weight at home. Severe COPD w/ acute [place] - IV Steroids w/ wean to BID on 8/13. Continue nebs, inhalers. Chronic iron def anemia - not compliant with home iron. Will get iron panel and consider IV Venofer. A fib - rate controlled. dHF - on Demedex, daily wt.      ED Triage Vitals   Temperature Pulse Respirations Blood Pressure SpO2   08/11/23 1652 08/11/23 1651 08/11/23 1652 08/11/23 1651 08/11/23 1651   98 °F (36.7 °C) 80 22 134/74 96 %      Temp Source Heart Rate Source Patient Position - Orthostatic VS BP Location FiO2 (%)   08/11/23 1652 08/11/23 1651 08/11/23 1651 08/11/23 1651 08/11/23 1813   Temporal Monitor Sitting Left arm 40      Pain Score       08/11/23 2023       No Pain          Wt Readings from Last 1 Encounters:   08/13/23 135 kg (298 lb 3.2 oz)     Additional Vital Signs:   08/13/23 1335 -- -- -- -- -- 94 % -- 40 5 L/min Nasal cannula -- --   08/13/23 1124 -- 90 -- -- -- 92 % -- 40 5 L/min Nasal cannula -- --   08/13/23 1110 -- 75 -- -- -- 94 % -- 36 4 L/min Nasal cannula -- --   08/13/23 0477 -- 88 -- -- -- 93 % -- 40 5 L/min Nasal cannula -- --   08/13/23 0726 -- -- -- -- -- 97 % -- 40 5 L/min Nasal cannula -- --   08/13/23 07:24:04 97.2 °F (36.2 °C) Abnormal  78 15 110/57 75 97 % -- -- -- -- -- --   08/13/23 0417 -- -- -- -- -- 90 % -- -- -- -- Full face mask --   08/13/23 03:06:41 97.9 °F (36.6 °C) 82 20 111/58 76 87 % Abnormal  -- -- -- -- -- Lying   08/12/23 2131 -- -- -- -- -- 96 % -- -- -- -- Full face mask --   08/12/23 2029 -- -- -- -- -- 99 % -- -- -- -- -- --   08/12/23 2002 -- 83 21 -- -- 92 % -- 40 5 L/min Nasal cannula -- --   08/12/23 1926 98.6 °F (37 °C) 85 22 106/55 77 -- -- -- -- Nasal cannula -- Sitting   08/12/23 1538 97.8 °F (36.6 °C) -- -- 113/68 81 92 % -- -- -- Nasal cannula -- Sitting   08/12/23 1417 -- -- -- -- -- 90 % -- 40 5 L/min Nasal cannula -- --   08/12/23 0940 -- -- -- -- -- 97 % -- -- -- -- Full face mask --   08/12/23 0845 -- -- -- -- -- 90 % -- -- -- Mid flow nasal cannula MFNC prongs --   08/12/23 0745 98.3 °F (36.8 °C) -- -- 112/56 77 90 % -- -- -- Nasal cannula -- Sitting   08/12/23 0615 98.6 °F (37 °C) 79 17 105/58 78 96 % -- -- -- BiPAP -- --   08/12/23 0431 -- -- -- -- -- 91 % -- -- -- -- Face mask --   08/11/23 2224 -- -- -- -- -- -- -- -- -- -- Face mask --   08/11/23 2108 -- -- -- -- -- 98 % -- -- -- -- -- --   08/11/23 2050 -- 93 32 Abnormal  -- -- 92 % -- 60 10 L/min Mid flow nasal cannula -- --   08/11/23 2000 98.1 °F (36.7 °C) 89 26 Abnormal  129/64 89 -- -- -- -- -- -- Lying   08/11/23 1913 -- -- -- -- -- 92 % -- 60 10 L/min Mid flow nasal cannula -- --   08/11/23 1813 -- -- -- -- -- 96 % 40 -- -- BiPAP Face mask --   08/11/23 1707 -- -- -- -- -- 94 % -- 36 4 L/min Nasal cannula -- --     Pertinent Labs/Diagnostic Test Results:     8/11 ECG - Normal sinus rhythm  Normal ECG    X-ray chest 1 view portable   Final Result by Marquis Napier MD (08/11 1702)      Mild pulmonary vascular congestion is similar to July 22, 2023.      Results from last 7 days   Lab Units 08/11/23  1704   SARS-COV-2  Negative     Results from last 7 days   Lab Units 08/13/23  0513 08/12/23  0545 08/11/23  1746 08/11/23  1704   WBC Thousand/uL 12.62* 12.68*  --  13.95*   HEMOGLOBIN g/dL 8.4* 8.7*  --  9.2*   I STAT HEMOGLOBIN g/dl  --   --  10.9*  --    HEMATOCRIT % 29.1* 31.1*  --  33.6*   HEMATOCRIT, ISTAT %  --   --  32*  --    PLATELETS Thousands/uL 273 300  --  350   NEUTROS ABS Thousands/µL  --   --   --  10.95*         Results from last 7 days   Lab Units 08/13/23  0513 08/12/23  1806 08/12/23  0545 08/11/23  1746 08/11/23  1704   SODIUM mmol/L 135 128* 134*  --  134*   POTASSIUM mmol/L 3.6 4.4 4.2  --  4.9   CHLORIDE mmol/L 91* 86* 91*  --  91*   CO2 mmol/L 35* 30 35*  --  39*   CO2, I-STAT mmol/L  --   --   --  44*  --    ANION GAP mmol/L 9 12 8  --  4   BUN mg/dL 20 20 13  --  11   CREATININE mg/dL 0.62 0.91 0.58*  --  0.59*   EGFR ml/min/1.73sq m 95 66 97  --  97   CALCIUM mg/dL 8.6 8.4 8.2*  --  8.0*   CALCIUM, IONIZED, ISTAT mmol/L  --   --   --  1.11*  --    MAGNESIUM mg/dL 2.4  --  2.4  --   --    PHOSPHORUS mg/dL  --   --  4.0  --   --      Results from last 7 days   Lab Units 08/13/23  0513 08/11/23  1704   AST U/L 8* 28   ALT U/L 14 18   ALK PHOS U/L 99 119*   TOTAL PROTEIN g/dL 6.2* 6.5   ALBUMIN g/dL 3.9 3.8   TOTAL BILIRUBIN mg/dL 0.32 0.34     Results from last 7 days   Lab Units 08/13/23  1430 08/13/23  1124 08/13/23  0917 08/13/23  0725 08/13/23  0518 08/13/23  0301 08/13/23  0219 08/13/23  0113 08/13/23  0003 08/12/23  2301 08/12/23  2212 08/12/23  2039   POC GLUCOSE mg/dl 265* 312* 392* 164* 199* 228* 252* 234* 286* 363* 396* 478*     Results from last 7 days   Lab Units 08/13/23  0513 08/12/23  1806 08/12/23  1617 08/12/23  0545 08/11/23  1704   GLUCOSE RANDOM mg/dL 196* 574* 519* 403* 269*             BETA-HYDROXYBUTYRATE   Date Value Ref Range Status   06/03/2023 0.2 <0.6 mmol/L Final      Results from last 7 days   Lab Units 08/12/23  0545 08/11/23  1920 PH ART  7.369 7.334*   PCO2 ART mm Hg 62.1* 64.8*   PO2 ART mm Hg 86.1 65.3*   HCO3 ART mmol/L 35.0* 33.7*   BASE EXC ART mmol/L 8.3 6.4   O2 CONTENT ART mL/dL 12.3* 12.4*   O2 HGB, ARTERIAL % 93.4* 88.5*   ABG SOURCE  Radial, Left Radial, Left         Results from last 7 days   Lab Units 08/11/23  1746   PH, DARVIN I-STAT  7.337   PCO2, DARVIN ISTAT mm HG 78.4*   PO2, DARVIN ISTAT mm HG 53.0*   HCO3, DARVIN ISTAT mmol/L 42.0*   I STAT BASE EXC mmol/L 13*   I STAT O2 SAT % 83         Results from last 7 days   Lab Units 08/11/23  2220 08/11/23  1950 08/11/23  1704   HS TNI 0HR ng/L  --   --  7   HS TNI 2HR ng/L  --  6  --    HSTNI D2 ng/L  --  -1  --    HS TNI 4HR ng/L 4  --   --    HSTNI D4 ng/L -3  --   --          Results from last 7 days   Lab Units 08/11/23  1704   PROTIME seconds 15.4*   INR  1.14   PTT seconds 31         Results from last 7 days   Lab Units 08/13/23  0513 08/12/23  0545 08/11/23  1704   PROCALCITONIN ng/ml 0.06 0.05 0.05     Results from last 7 days   Lab Units 08/12/23  0111 08/11/23  2217 08/11/23  1950 08/11/23  1704   LACTIC ACID mmol/L 2.6* 3.0* 2.6* 2.4*             Results from last 7 days   Lab Units 08/11/23  1704   BNP pg/mL 73     Results from last 7 days   Lab Units 08/12/23  0545   FERRITIN ng/mL 7*   IRON SATURATION % 3*   IRON ug/dL 19*   TIBC ug/dL 558*     Results from last 7 days   Lab Units 08/12/23  0113   CLARITY UA  Clear   COLOR UA  Light Yellow   SPEC GRAV UA  <1.005*   PH UA  5.0   GLUCOSE UA mg/dl 1000 (1%)*   KETONES UA mg/dl Negative   BLOOD UA  Negative   PROTEIN UA mg/dl Negative   NITRITE UA  Negative   BILIRUBIN UA  Negative   UROBILINOGEN UA (BE) mg/dl <2.0   LEUKOCYTES UA  Negative     Results from last 7 days   Lab Units 08/11/23  1704   INFLUENZA A PCR  Negative   INFLUENZA B PCR  Negative   RSV PCR  Negative     Results from last 7 days   Lab Units 08/11/23  1741 08/11/23  1704   BLOOD CULTURE  No Growth at 24 hrs. No Growth at 24 hrs.      ED Treatment:   Medication Administration from 08/11/2023 1638 to 08/11/2023 2019       Date/Time Order Dose Route Action     08/11/2023 1706 EDT albuterol inhalation solution 10 mg 10 mg Nebulization Given     08/11/2023 1706 EDT ipratropium (ATROVENT) 0.02 % inhalation solution 1 mg 1 mg Nebulization Given     08/11/2023 1706 EDT sodium chloride 0.9 % inhalation solution 12 mL 12 mL Nebulization Given     08/11/2023 1731 EDT magnesium sulfate 2 g/50 mL IVPB (premix) 2 g 2 g Intravenous New Bag     08/11/2023 1731 EDT methylPREDNISolone sodium succinate (Solu-MEDROL) injection 125 mg 125 mg Intravenous Given     08/11/2023 1745 EDT cefTRIAXone (ROCEPHIN) IVPB (premix in dextrose) 1,000 mg 50 mL 1,000 mg Intravenous New Bag     08/11/2023 1835 EDT azithromycin (ZITHROMAX) 500 mg in sodium chloride 0.9% 250mL IVPB 500 mg 500 mg Intravenous New Bag     08/11/2023 1900 EDT sodium chloride 0.9 % bolus 250 mL 250 mL Intravenous New Bag     08/11/2023 1954 EDT furosemide (LASIX) injection 60 mg 60 mg Intravenous Given        Past Medical History:   Diagnosis Date   • Acute blood loss anemia 6/1/2021   • Acute diverticulitis 5/2/2021   • Alcohol abuse 5/21/2020   • Anemia    • Atrial fibrillation (HCC)    • Cervical radiculopathy    • CHF (congestive heart failure) ([address])    • Chronic low back pain    • Chronic obstructive asthma ([address]) 2/20/2018   • Cigarette nicotine dependence without complication 83/28/4448    Quit 12/10/2019.     • Community acquired pneumonia 5/21/2020   • Depression with anxiety 3/9/2014   • Diabetes mellitus with hyperglycemia ([address])    • Diverticulitis 6/6/2021   • Elevated liver enzymes    • GERD (gastroesophageal reflux disease)    • Hypersomnolence 10/28/2020   • Hypokalemia 12/4/2018   • Paresthesia of upper extremity    • Plantar fasciitis    • Restless legs syndrome 4/8/2014   • Sexual dysfunction    • Sleep apnea, obstructive    • Tenosynovitis of finger    • Tinea corporis    • Tobacco use 2/20/2018    Currently smoking 3-4 cigarettes daily   • Trigger middle finger of left hand 12/14/2017   • Trigger ring finger of left hand 12/14/2017   • Weakness of upper extremity      Present on Admission:  • Asthma with COPD with exacerbation ([address])  • Acute on chronic respiratory failure with hypoxia (HCC)  • Chronic diastolic congestive heart failure (HCC)  • Class 3 severe obesity in adult [facility])  • Paroxysmal atrial fibrillation (HCC)  • Obstructive sleep apnea  • Lactic acidosis      Admitting Diagnosis: SOB (shortness of breath) [R06.02]  COPD exacerbation (HCC) [J44.1]  Acute on chronic respiratory failure with hypoxia (HCC) [J96.21]  Age/Sex: 57 y.o. female  Admission Orders:  Scheduled Medications:  apixaban, 5 mg, Oral, BID  atorvastatin, 40 mg, Oral, Daily  budesonide, 0.5 mg, Nebulization, Q12H  escitalopram, 20 mg, Oral, Daily  fluticasone, 1 spray, Nasal, BID  formoterol, 20 mcg, Nebulization, Q12H  furosemide, 40 mg, Intravenous, BID (diuretic)  ipratropium, 0.5 mg, Nebulization, TID  levalbuterol, 1.25 mg, Nebulization, TID  loratadine, 10 mg, Oral, Daily  LORazepam, 1 mg, Oral, HS  methylPREDNISolone sodium succinate, 40 mg, Intravenous, Once  metoprolol succinate, 100 mg, Oral, Daily  montelukast, 10 mg, Oral, HS  nystatin, , Topical, BID  pantoprazole, 40 mg, Oral, Early Morning  [START ON 8/14/2023] predniSONE, 40 mg, Oral, Daily  traZODone, 150 mg, Oral, HS      Continuous IV Infusions:  insulin regular (HumuLIN R,NovoLIN R) 1 Units/mL in sodium chloride 0.9 % 100 mL infusion, 0.3-21 Units/hr, Intravenous, Titrated  multi-electrolyte, 50 mL/hr, Intravenous, Continuous      PRN Meds:  acetaminophen, 650 mg, Oral, Q6H PRN - X 1 8/12, 8/13    Level 2 SD  IV insulin drip  IV fluids, daily wt  POC GLUCOSE q 2 hr while on insulin drip WITH [employer] COVERAGE   ADA diet   BIPAP, [address] oxygen  IP CONSULT TO PULMONOLOGY    Network Utilization Review Department  ATTENTION: Please call with any questions or concerns to [phone] and carefully listen to the prompts so that you are directed to the right person. All voicemails are confidential.  [place] all requests for admission clinical reviews, approved or denied determinations and any other requests to dedicated fax number below belonging to the campus where the patient is receiving treatment.  List of dedicated fax numbers for the Facilities:  [place] [facility] (Administrative/Medical Necessity) [phone] [address] [facility] (Maternity/NICU/Pediatrics) [phone]   [address] [place] [address] [phone]   [facility] [address] [facility] [phone]   [facility] [address] [phone]   [address] [address] [phone]

## 2023-08-14 ENCOUNTER — APPOINTMENT (INPATIENT)
Dept: NON INVASIVE DIAGNOSTICS | Facility: HOSPITAL | Age: 63
DRG: 133 | End: 2023-08-14
Payer: COMMERCIAL

## 2023-08-14 ENCOUNTER — APPOINTMENT (INPATIENT)
Dept: RADIOLOGY | Facility: HOSPITAL | Age: 63
DRG: 133 | End: 2023-08-14
Payer: COMMERCIAL

## 2023-08-14 ENCOUNTER — PATIENT OUTREACH (OUTPATIENT)
Dept: FAMILY MEDICINE CLINIC | Facility: HOSPITAL | Age: 63
End: 2023-08-14

## 2023-08-14 LAB
ANION GAP SERPL CALCULATED.3IONS-SCNC: 6 MMOL/L
BASOPHILS # BLD AUTO: 0.01 THOUSANDS/ÂΜL (ref 0–0.1)
BASOPHILS NFR BLD AUTO: 0 % (ref 0–1)
BUN SERPL-MCNC: 22 MG/DL (ref 5–25)
CALCIUM SERPL-MCNC: 7.8 MG/DL (ref 8.4–10.2)
CHLORIDE SERPL-SCNC: 92 MMOL/L (ref 96–108)
CO2 SERPL-SCNC: 38 MMOL/L (ref 21–32)
CREAT SERPL-MCNC: 0.57 MG/DL (ref 0.6–1.3)
EOSINOPHIL # BLD AUTO: 0 THOUSAND/ÂΜL (ref 0–0.61)
EOSINOPHIL NFR BLD AUTO: 0 % (ref 0–6)
ERYTHROCYTE [DISTWIDTH] IN BLOOD BY AUTOMATED COUNT: 18.9 % (ref 11.6–15.1)
GFR SERPL CREATININE-BSD FRML MDRD: 98 ML/MIN/1.73SQ M
GLUCOSE SERPL-MCNC: 171 MG/DL (ref 65–140)
GLUCOSE SERPL-MCNC: 183 MG/DL (ref 65–140)
GLUCOSE SERPL-MCNC: 196 MG/DL (ref 65–140)
GLUCOSE SERPL-MCNC: 210 MG/DL (ref 65–140)
GLUCOSE SERPL-MCNC: 217 MG/DL (ref 65–140)
GLUCOSE SERPL-MCNC: 219 MG/DL (ref 65–140)
GLUCOSE SERPL-MCNC: 226 MG/DL (ref 65–140)
GLUCOSE SERPL-MCNC: 258 MG/DL (ref 65–140)
GLUCOSE SERPL-MCNC: 259 MG/DL (ref 65–140)
GLUCOSE SERPL-MCNC: 269 MG/DL (ref 65–140)
GLUCOSE SERPL-MCNC: 287 MG/DL (ref 65–140)
GLUCOSE SERPL-MCNC: 288 MG/DL (ref 65–140)
GLUCOSE SERPL-MCNC: 295 MG/DL (ref 65–140)
GLUCOSE SERPL-MCNC: 344 MG/DL (ref 65–140)
HCT VFR BLD AUTO: 29.4 % (ref 34.8–46.1)
HGB BLD-MCNC: 8.4 G/DL (ref 11.5–15.4)
IMM GRANULOCYTES # BLD AUTO: 0.11 THOUSAND/UL (ref 0–0.2)
IMM GRANULOCYTES NFR BLD AUTO: 1 % (ref 0–2)
LYMPHOCYTES # BLD AUTO: 0.89 THOUSANDS/ÂΜL (ref 0.6–4.47)
LYMPHOCYTES NFR BLD AUTO: 6 % (ref 14–44)
MCH RBC QN AUTO: 19.3 PG (ref 26.8–34.3)
MCHC RBC AUTO-ENTMCNC: 28.6 G/DL (ref 31.4–37.4)
MCV RBC AUTO: 67 FL (ref 82–98)
MONOCYTES # BLD AUTO: 0.78 THOUSAND/ÂΜL (ref 0.17–1.22)
MONOCYTES NFR BLD AUTO: 6 % (ref 4–12)
NEUTROPHILS # BLD AUTO: 12.09 THOUSANDS/ÂΜL (ref 1.85–7.62)
NEUTS SEG NFR BLD AUTO: 87 % (ref 43–75)
NRBC BLD AUTO-RTO: 0 /100 WBCS
PLATELET # BLD AUTO: 304 THOUSANDS/UL (ref 149–390)
PMV BLD AUTO: 11.2 FL (ref 8.9–12.7)
POTASSIUM SERPL-SCNC: 3.7 MMOL/L (ref 3.5–5.3)
RBC # BLD AUTO: 4.36 MILLION/UL (ref 3.81–5.12)
SODIUM SERPL-SCNC: 136 MMOL/L (ref 135–147)
WBC # BLD AUTO: 13.88 THOUSAND/UL (ref 4.31–10.16)

## 2023-08-14 PROCEDURE — 82948 REAGENT STRIP/BLOOD GLUCOSE: CPT

## 2023-08-14 PROCEDURE — 71045 X-RAY EXAM CHEST 1 VIEW: CPT

## 2023-08-14 PROCEDURE — 99232 SBSQ HOSP IP/OBS MODERATE 35: CPT | Performed by: INTERNAL MEDICINE

## 2023-08-14 PROCEDURE — 94760 N-INVAS EAR/PLS OXIMETRY 1: CPT

## 2023-08-14 PROCEDURE — 94660 CPAP INITIATION&MGMT: CPT

## 2023-08-14 PROCEDURE — 80048 BASIC METABOLIC PNL TOTAL CA: CPT | Performed by: INTERNAL MEDICINE

## 2023-08-14 PROCEDURE — 85025 COMPLETE CBC W/AUTO DIFF WBC: CPT | Performed by: INTERNAL MEDICINE

## 2023-08-14 PROCEDURE — 94640 AIRWAY INHALATION TREATMENT: CPT

## 2023-08-14 RX ORDER — INSULIN GLARGINE 100 [IU]/ML
0.3 INJECTION, SOLUTION SUBCUTANEOUS
Qty: 15 ML | Refills: 3 | Status: SHIPPED | OUTPATIENT
Start: 2023-08-14

## 2023-08-14 RX ORDER — TORSEMIDE 20 MG/1
20 TABLET ORAL ONCE
Status: COMPLETED | OUTPATIENT
Start: 2023-08-14 | End: 2023-08-14

## 2023-08-14 RX ORDER — INSULIN LISPRO 100 [IU]/ML
18 INJECTION, SOLUTION INTRAVENOUS; SUBCUTANEOUS
Status: DISCONTINUED | OUTPATIENT
Start: 2023-08-15 | End: 2023-08-15

## 2023-08-14 RX ORDER — FUROSEMIDE 10 MG/ML
40 INJECTION INTRAMUSCULAR; INTRAVENOUS ONCE
Status: DISCONTINUED | OUTPATIENT
Start: 2023-08-14 | End: 2023-08-14

## 2023-08-14 RX ORDER — BUDESONIDE 0.5 MG/2ML
INHALANT ORAL
Qty: 60 ML | Refills: 3 | Status: SHIPPED | OUTPATIENT
Start: 2023-08-14

## 2023-08-14 RX ORDER — INSULIN GLARGINE 100 [IU]/ML
45 INJECTION, SOLUTION SUBCUTANEOUS
Status: DISCONTINUED | OUTPATIENT
Start: 2023-08-14 | End: 2023-08-15

## 2023-08-14 RX ORDER — INSULIN LISPRO 100 [IU]/ML
4-20 INJECTION, SOLUTION INTRAVENOUS; SUBCUTANEOUS
Status: DISCONTINUED | OUTPATIENT
Start: 2023-08-14 | End: 2023-08-22 | Stop reason: HOSPADM

## 2023-08-14 RX ORDER — TORSEMIDE 20 MG/1
60 TABLET ORAL 2 TIMES DAILY
Status: DISCONTINUED | OUTPATIENT
Start: 2023-08-14 | End: 2023-08-15

## 2023-08-14 RX ADMIN — ESCITALOPRAM OXALATE 20 MG: 20 TABLET ORAL at 08:42

## 2023-08-14 RX ADMIN — LORAZEPAM 1 MG: 1 TABLET ORAL at 21:29

## 2023-08-14 RX ADMIN — LEVALBUTEROL HYDROCHLORIDE 1.25 MG: 1.25 SOLUTION RESPIRATORY (INHALATION) at 13:27

## 2023-08-14 RX ADMIN — IPRATROPIUM BROMIDE 0.5 MG: 0.5 SOLUTION RESPIRATORY (INHALATION) at 19:11

## 2023-08-14 RX ADMIN — BUDESONIDE 0.5 MG: 0.5 INHALANT ORAL at 07:02

## 2023-08-14 RX ADMIN — SODIUM CHLORIDE 12 UNITS/HR: 9 INJECTION, SOLUTION INTRAVENOUS at 16:20

## 2023-08-14 RX ADMIN — SODIUM CHLORIDE 7 UNITS/HR: 9 INJECTION, SOLUTION INTRAVENOUS at 00:18

## 2023-08-14 RX ADMIN — INSULIN GLARGINE 45 UNITS: 100 INJECTION, SOLUTION SUBCUTANEOUS at 22:26

## 2023-08-14 RX ADMIN — MONTELUKAST 10 MG: 10 TABLET, FILM COATED ORAL at 21:29

## 2023-08-14 RX ADMIN — TORSEMIDE 20 MG: 20 TABLET ORAL at 09:49

## 2023-08-14 RX ADMIN — FORMOTEROL FUMARATE DIHYDRATE 20 MCG: 20 SOLUTION RESPIRATORY (INHALATION) at 07:02

## 2023-08-14 RX ADMIN — BUDESONIDE 0.5 MG: 0.5 INHALANT ORAL at 19:11

## 2023-08-14 RX ADMIN — SODIUM CHLORIDE 6 UNITS/HR: 9 INJECTION, SOLUTION INTRAVENOUS at 12:02

## 2023-08-14 RX ADMIN — FLUTICASONE PROPIONATE 1 SPRAY: 50 SPRAY, METERED NASAL at 20:18

## 2023-08-14 RX ADMIN — LEVALBUTEROL HYDROCHLORIDE 1.25 MG: 1.25 SOLUTION RESPIRATORY (INHALATION) at 07:02

## 2023-08-14 RX ADMIN — NYSTATIN: 100000 POWDER TOPICAL at 17:19

## 2023-08-14 RX ADMIN — APIXABAN 5 MG: 5 TABLET, FILM COATED ORAL at 08:41

## 2023-08-14 RX ADMIN — METOPROLOL SUCCINATE 100 MG: 50 TABLET, EXTENDED RELEASE ORAL at 08:41

## 2023-08-14 RX ADMIN — IPRATROPIUM BROMIDE 0.5 MG: 0.5 SOLUTION RESPIRATORY (INHALATION) at 13:27

## 2023-08-14 RX ADMIN — TRAZODONE HYDROCHLORIDE 150 MG: 150 TABLET ORAL at 21:29

## 2023-08-14 RX ADMIN — FORMOTEROL FUMARATE DIHYDRATE 20 MCG: 20 SOLUTION RESPIRATORY (INHALATION) at 19:11

## 2023-08-14 RX ADMIN — SODIUM CHLORIDE, SODIUM GLUCONATE, SODIUM ACETATE, POTASSIUM CHLORIDE, MAGNESIUM CHLORIDE, SODIUM PHOSPHATE, DIBASIC, AND POTASSIUM PHOSPHATE 50 ML/HR: .53; .5; .37; .037; .03; .012; .00082 INJECTION, SOLUTION INTRAVENOUS at 08:47

## 2023-08-14 RX ADMIN — APIXABAN 5 MG: 5 TABLET, FILM COATED ORAL at 17:18

## 2023-08-14 RX ADMIN — NYSTATIN: 100000 POWDER TOPICAL at 08:44

## 2023-08-14 RX ADMIN — SODIUM CHLORIDE 7 UNITS/HR: 9 INJECTION, SOLUTION INTRAVENOUS at 04:17

## 2023-08-14 RX ADMIN — PANTOPRAZOLE SODIUM 40 MG: 40 TABLET, DELAYED RELEASE ORAL at 06:13

## 2023-08-14 RX ADMIN — IRON SUCROSE 200 MG: 20 INJECTION, SOLUTION INTRAVENOUS at 09:46

## 2023-08-14 RX ADMIN — ATORVASTATIN CALCIUM 40 MG: 40 TABLET, FILM COATED ORAL at 08:42

## 2023-08-14 RX ADMIN — PREDNISONE 40 MG: 20 TABLET ORAL at 08:41

## 2023-08-14 RX ADMIN — TORSEMIDE 60 MG: 20 TABLET ORAL at 17:18

## 2023-08-14 RX ADMIN — DIPHENHYDRAMINE HYDROCHLORIDE 25 MG: 50 INJECTION, SOLUTION INTRAMUSCULAR; INTRAVENOUS at 09:37

## 2023-08-14 RX ADMIN — IPRATROPIUM BROMIDE 0.5 MG: 0.5 SOLUTION RESPIRATORY (INHALATION) at 07:02

## 2023-08-14 RX ADMIN — ACETAMINOPHEN 325MG 650 MG: 325 TABLET ORAL at 09:37

## 2023-08-14 RX ADMIN — LORATADINE 10 MG: 10 TABLET ORAL at 08:42

## 2023-08-14 RX ADMIN — FUROSEMIDE 40 MG: 10 INJECTION, SOLUTION INTRAMUSCULAR; INTRAVENOUS at 08:43

## 2023-08-14 RX ADMIN — LEVALBUTEROL HYDROCHLORIDE 1.25 MG: 1.25 SOLUTION RESPIRATORY (INHALATION) at 19:11

## 2023-08-14 NOTE — UTILIZATION REVIEW
NOTIFICATION OF INPATIENT ADMISSION   AUTHORIZATION REQUEST   SERVICING FACILITY:   [address] [place] [address]  [address] [facility],Third Floor [address]  Tax ID: [id number]  NPI: [id number] ATTENDING PROVIDER:  Attending Name and NPI#: Cheyenne Franz Md [[id number]]  Address: [facility] [address] [facility],[address] [place] [address]  Phone: [phone]   ADMISSION INFORMATION:  Place of Service: [address] [place] [address]  Place of Service Code: 21  Inpatient Admission Date/Time: 8/11/23  7:23 PM  Discharge Date/Time: No discharge date for patient encounter. Admitting Diagnosis Code/Description:  SOB (shortness of breath) [R06.02]  COPD exacerbation ([facility]) [J44.1]  Acute on chronic respiratory failure with hypoxia ([address]) [J96.21]     UTILIZATION REVIEW CONTACT:  Jaelyn Mcadams Utilization   Network Utilization Review Department  Phone: [phone]  Fax [phone]  Email: [email or web address]  Contact for approvals/pending authorizations, clinical reviews, and discharge. PHYSICIAN ADVISORY SERVICES:  Medical Necessity Denial & [employer] Review  Phone: [phone]  Fax: [phone]  Email: [email or web address]

## 2023-08-14 NOTE — PLAN OF CARE
Problem: PAIN - ADULT  Goal: Verbalizes/displays adequate comfort level or baseline comfort level  Description: Interventions:  - Encourage patient to monitor pain and request assistance  - Assess pain using appropriate pain scale  - Administer analgesics based on type and severity of pain and evaluate response  - Implement non-pharmacological measures as appropriate and evaluate response  - Consider cultural and social influences on pain and pain management  - Notify physician/advanced practitioner if interventions unsuccessful or patient reports new pain  Outcome: Progressing     Problem: INFECTION - ADULT  Goal: Absence or prevention of progression during hospitalization  Description: INTERVENTIONS:  - Assess and monitor for signs and symptoms of infection  - Monitor lab/diagnostic results  - Monitor all insertion sites, i.e. indwelling lines, tubes, and drains  - Monitor endotracheal if appropriate and nasal secretions for changes in amount and color  - Rewey appropriate cooling/warming therapies per order  - Administer medications as ordered  - Instruct and encourage patient and family to use good hand hygiene technique  - Identify and instruct in appropriate isolation precautions for identified infection/condition  Outcome: Progressing     Problem: SAFETY ADULT  Goal: Patient will remain free of falls  Description: INTERVENTIONS:  - Educate patient/family on patient safety including physical limitations  - Instruct patient to call for assistance with activity   - Consult OT/PT to assist with strengthening/mobility   - Keep Call bell within reach  - Keep bed low and locked with side rails adjusted as appropriate  - Keep care items and personal belongings within reach  - Initiate and maintain comfort rounds  - Make Fall Risk Sign visible to staff  - Offer Toileting every x Hours, in advance of need  - Initiate/Maintain xalarm  - Obtain necessary fall risk management equipment: x  - Apply yellow socks and bracelet for high fall risk patients  - Consider moving patient to room near nurses station  Outcome: Progressing  Goal: Maintain or return to baseline ADL function  Description: INTERVENTIONS:  -  Assess patient's ability to carry out ADLs; assess patient's baseline for ADL function and identify physical deficits which impact ability to perform ADLs (bathing, care of mouth/teeth, toileting, grooming, dressing, etc.)  - Assess/evaluate cause of self-care deficits   - Assess range of motion  - Assess patient's mobility; develop plan if impaired  - Assess patient's need for assistive devices and provide as appropriate  - Encourage maximum independence but intervene and supervise when necessary  - Involve family in performance of ADLs  - Assess for home care needs following discharge   - Consider OT consult to assist with ADL evaluation and planning for discharge  - Provide patient education as appropriate  Outcome: Progressing  Goal: Maintains/Returns to pre admission functional level  Description: INTERVENTIONS:  - Perform BMAT or MOVE assessment daily.   - Set and communicate daily mobility goal to care team and patient/family/caregiver. - Collaborate with rehabilitation services on mobility goals if consulted  - Perform Range of Motion x times a day. - Reposition patient every x hours.   - Dangle patient x times a day  - Stand patient x times a day  - Ambulate patient x times a day  - Out of bed to chair x times a day   - Out of bed for meals x times a day  - Out of bed for toileting  - Record patient progress and toleration of activity level   Outcome: Progressing     Problem: DISCHARGE PLANNING  Goal: Discharge to home or other facility with appropriate resources  Description: INTERVENTIONS:  - Identify barriers to discharge w/patient and caregiver  - Arrange for needed discharge resources and transportation as appropriate  - Identify discharge learning needs (meds, wound care, etc.)  - Arrange for interpretive services to assist at discharge as needed  - Refer to Case Management Department for coordinating discharge planning if the patient needs post-hospital services based on physician/advanced practitioner order or complex needs related to functional status, cognitive ability, or social support system  Outcome: Progressing     Problem: Knowledge Deficit  Goal: Patient/family/caregiver demonstrates understanding of disease process, treatment plan, medications, and discharge instructions  Description: Complete learning assessment and assess knowledge base.   Interventions:  - Provide teaching at level of understanding  - Provide teaching via preferred learning methods  Outcome: Progressing     Problem: RESPIRATORY - ADULT  Goal: Achieves optimal ventilation and oxygenation  Description: INTERVENTIONS:  - Assess for changes in respiratory status  - Assess for changes in mentation and behavior  - Position to facilitate oxygenation and minimize respiratory effort  - Oxygen administered by appropriate delivery if ordered  - Initiate smoking cessation education as indicated  - Encourage broncho-pulmonary hygiene including cough, deep breathe, Incentive Spirometry  - Assess the need for suctioning and aspirate as needed  - Assess and instruct to report SOB or any respiratory difficulty  - Respiratory Therapy support as indicated  Outcome: Progressing     Problem: METABOLIC, FLUID AND ELECTROLYTES - ADULT  Goal: Electrolytes maintained within normal limits  Description: INTERVENTIONS:  - Monitor labs and assess patient for signs and symptoms of electrolyte imbalances  - Administer electrolyte replacement as ordered  - Monitor response to electrolyte replacements, including repeat lab results as appropriate  - Instruct patient on fluid and nutrition as appropriate  Outcome: Progressing  Goal: Fluid balance maintained  Description: INTERVENTIONS:  - Monitor labs   - Monitor I/O and WT  - Instruct patient on fluid and nutrition as appropriate  - Assess for signs & symptoms of volume excess or deficit  Outcome: Progressing  Goal: Glucose maintained within target range  Description: INTERVENTIONS:  - Monitor Blood Glucose as ordered  - Assess for signs and symptoms of hyperglycemia and hypoglycemia  - Administer ordered medications to maintain glucose within target range  - Assess nutritional intake and initiate nutrition service referral as needed  Outcome: Progressing     Problem: Nutrition/Hydration-ADULT  Goal: Nutrient/Hydration intake appropriate for improving, restoring or maintaining nutritional needs  Description: Monitor and assess patient's nutrition/hydration status for malnutrition. Collaborate with interdisciplinary team and initiate plan and interventions as ordered. Monitor patient's weight and dietary intake as ordered or per policy. Utilize nutrition screening tool and intervene as necessary. Determine patient's food preferences and provide high-protein, high-caloric foods as appropriate.      INTERVENTIONS:  - Monitor oral intake, urinary output, labs, and treatment plans  - Assess nutrition and hydration status and recommend course of action  - Evaluate amount of meals eaten  - Assist patient with eating if necessary   - Allow adequate time for meals  - Recommend/ encourage appropriate diets, oral nutritional supplements, and vitamin/mineral supplements  - Order, calculate, and assess calorie counts as needed  - Recommend, monitor, and adjust tube feedings and TPN/PPN based on assessed needs  - Assess need for intravenous fluids  - Provide specific nutrition/hydration education as appropriate  - Include patient/family/caregiver in decisions related to nutrition  Outcome: Progressing     Problem: CARDIOVASCULAR - ADULT  Goal: Maintains optimal cardiac output and hemodynamic stability  Description: INTERVENTIONS:  - Monitor I/O, vital signs and rhythm  - Monitor for S/S and trends of decreased cardiac output  - Administer and titrate ordered vasoactive medications to optimize hemodynamic stability  - Assess quality of pulses, skin color and temperature  - Assess for signs of decreased coronary artery perfusion  - Instruct patient to report change in severity of symptoms  Outcome: Progressing     Problem: GENITOURINARY - ADULT  Goal: Maintains or returns to baseline urinary function  Description: INTERVENTIONS:  - Assess urinary function  - Encourage oral fluids to ensure adequate hydration if ordered  - Administer IV fluids as ordered to ensure adequate hydration  - Administer ordered medications as needed  - Offer frequent toileting  - Follow urinary retention protocol if ordered  Outcome: Progressing     Problem: SKIN/TISSUE INTEGRITY - ADULT  Goal: Skin Integrity remains intact(Skin Breakdown Prevention)  Description: Assess:  -Perform Jt assessment every x  -Clean and moisturize skin every x  -Inspect skin when repositioning, toileting, and assisting with ADLS  -Assess under medical devices such as x every x  -Assess extremities for adequate circulation and sensation     Bed Management:  -Have minimal linens on bed & keep smooth, unwrinkled  -Change linens as needed when moist or perspiring  -Avoid sitting or lying in one position for more than x hours while in bed  -Keep HOB at [facility]     Toileting:  -Offer bedside commode  -Assess for incontinence every x  -Use incontinent care products after each incontinent episode such as x    Activity:  -Mobilize patient x times a day  -Encourage activity and walks on unit  -Encourage or provide ROM exercises   -Turn and reposition patient every x Hours  -Use appropriate equipment to lift or move patient in bed  -Instruct/ Assist with weight shifting every x when out of bed in chair  -Consider limitation of chair time x hour intervals    Skin Care:  -Avoid use of baby powder, tape, friction and shearing, hot water or constrictive clothing  -Relieve pressure over bony prominences using x  -Do not massage red bony areas    Next Steps:  -Teach patient strategies to minimize risks such as x   -Consider consults to  interdisciplinary teams such as x  Outcome: Progressing     Problem: HEMATOLOGIC - ADULT  Goal: Maintains hematologic stability  Description: INTERVENTIONS  - Assess for signs and symptoms of bleeding or hemorrhage  - Monitor labs  - Administer supportive blood products/factors as ordered and appropriate  Outcome: Progressing     Problem: MUSCULOSKELETAL - ADULT  Goal: Maintain or return mobility to safest level of function  Description: INTERVENTIONS:  - Assess patient's ability to carry out ADLs; assess patient's baseline for ADL function and identify physical deficits which impact ability to perform ADLs (bathing, care of mouth/teeth, toileting, grooming, dressing, etc.)  - Assess/evaluate cause of self-care deficits   - Assess range of motion  - Assess patient's mobility  - Assess patient's need for assistive devices and provide as appropriate  - Encourage maximum independence but intervene and supervise when necessary  - Involve family in performance of ADLs  - Assess for home care needs following discharge   - Consider OT consult to assist with ADL evaluation and planning for discharge  - Provide patient education as appropriate  Outcome: Progressing     Problem: Prexisting or High Potential for Compromised Skin Integrity  Goal: Skin integrity is maintained or improved  Description: INTERVENTIONS:  - Identify patients at risk for skin breakdown  - Assess and monitor skin integrity  - Assess and monitor nutrition and hydration status  - Monitor labs   - Assess for incontinence   - Turn and reposition patient  - Assist with mobility/ambulation  - Relieve pressure over bony prominences  - Avoid friction and shearing  - Provide appropriate hygiene as needed including keeping skin clean and dry  - Evaluate need for skin moisturizer/barrier cream  - Collaborate with interdisciplinary team - Patient/family teaching  - Consider wound care consult   Outcome: Progressing     Problem: Potential for Falls  Goal: Patient will remain free of falls  Description: INTERVENTIONS:  - Educate patient/family on patient safety including physical limitations  - Instruct patient to call for assistance with activity   - Consult OT/PT to assist with strengthening/mobility   - Keep Call bell within reach  - Keep bed low and locked with side rails adjusted as appropriate  - Keep care items and personal belongings within reach  - Initiate and maintain comfort rounds  - Make Fall Risk Sign visible to staff  - Offer Toileting every x Hours, in advance of need  - Initiate/Maintain xalarm  - Obtain necessary fall risk management equipment: x  - Apply yellow socks and bracelet for high fall risk patients  - Consider moving patient to room near nurses station  Outcome: Progressing     Problem: MOBILITY - ADULT  Goal: Maintain or return to baseline ADL function  Description: INTERVENTIONS:  -  Assess patient's ability to carry out ADLs; assess patient's baseline for ADL function and identify physical deficits which impact ability to perform ADLs (bathing, care of mouth/teeth, toileting, grooming, dressing, etc.)  - Assess/evaluate cause of self-care deficits   - Assess range of motion  - Assess patient's mobility; develop plan if impaired  - Assess patient's need for assistive devices and provide as appropriate  - Encourage maximum independence but intervene and supervise when necessary  - Involve family in performance of ADLs  - Assess for home care needs following discharge   - Consider OT consult to assist with ADL evaluation and planning for discharge  - Provide patient education as appropriate  Outcome: Progressing  Goal: Maintains/Returns to pre admission functional level  Description: INTERVENTIONS:  - Perform BMAT or MOVE assessment daily.   - Set and communicate daily mobility goal to care team and patient/family/caregiver. - Collaborate with rehabilitation services on mobility goals if consulted  - Perform Range of Motion x times a day. - Reposition patient every x hours.   - Dangle patient x times a day  - Stand patient x times a day  - Ambulate patient x times a day  - Out of bed to chair x times a day   - Out of bed for meals x times a day  - Out of bed for toileting  - Record patient progress and toleration of activity level   Outcome: Progressing

## 2023-08-14 NOTE — ASSESSMENT & PLAN NOTE
Wt Readings from Last 3 Encounters:   08/14/23 134 kg (296 lb 8 oz)   07/26/23 132 kg (291 lb 9.6 oz)   06/08/23 129 kg (284 lb)     • Patient is currently 14 pounds over her most recent outpatient weight  • Pitting edema present; lung field auscultation difficult given body habitus  • BNP 73  • Received 60mg IV lasix in ED  • On admission, was placed back on torsemide 60 mg twice daily p.o.  • ins and outs, daily weights     Plan  • Low-sodium diabetic diet  • DC IV Lasix.   Will restart on torsemide 60 mg twice daily  • Strict I/O  • Daily weights  · Trend BMP

## 2023-08-14 NOTE — PROGRESS NOTES
Outpatient Care Management Note:    ADT alert received. Patient was admitted to [address] [place] [address] with COPD exacerbation.

## 2023-08-14 NOTE — CASE MANAGEMENT
Case Management Assessment & Discharge Planning Note    Patient name Virginia Clinton  Location /-72 MRN [id number]  : 1960 Date 2023       Current Admission Date: 2023  Current Admission Diagnosis:Asthma with COPD with exacerbation [facility])   Patient Active Problem List    Diagnosis Date Noted   • Eosinophilic asthma    • COPD, severe ([address]) 2023   • Hepatic steatosis 2023   • Morbid obesity ([address]) 02/15/2023   • Diabetic polyneuropathy associated with type 2 diabetes mellitus ([address]) 11/15/2021   • Type 2 diabetes mellitus with hyperglycemia ([address]) 2021   • Tubular adenoma of colon 2021   • Lower gastrointestinal bleeding 2021   • Transaminitis 2021   • Gastric polyp 2021   • Class 3 severe obesity in adult [facility]) 2021   • Pulmonary hypertension ([address]) 2020   • Asthma with COPD with exacerbation ([address]) 2020   • Chronic hypercapnic respiratory failure ([address]) 2020   • Insomnia 10/28/2020   • Abnormal CT of the chest 2020   • Asthma-COPD overlap syndrome ([address]) 2020   • Lactic acidosis 2020   • Chronic diastolic congestive heart failure ([address]) 2020   • Microcytic anemia 2020   • Neck pain, chronic 2019   • Current moderate episode of major depressive disorder without prior episode ([address]) 2019   • Paroxysmal atrial fibrillation ([address]) 2018   • Severe persistent asthma 2018   • Acute on chronic respiratory failure with hypoxia ([address]) 2018   • Abnormal computed tomography angiography (CTA) of abdomen 2018   • Abnormal CT of the abdomen 2018   • Iron deficiency anemia due to chronic blood loss 2018   • Type 2 diabetes mellitus with stage 2 chronic kidney disease, with long-term current use of insulin ([address])    • Severe persistent asthma with exacerbation 2018   • Ganglion cyst of flexor tendon sheath 2017   • Paresthesia of upper extremity 09/20/2017   • Cervical radiculopathy 09/13/2017   • Elevated liver enzymes 12/30/2015   • Sexual dysfunction 11/11/2014   • Chronic low back pain 10/15/2014   • Hypercholesterolemia 09/09/2014   • Lumbar radiculopathy 09/09/2014   • Esophageal reflux 06/23/2014   • Hypertensive heart and chronic kidney disease with heart failure and stage 1 through stage 4 chronic kidney disease, or chronic kidney disease ([facility]) 03/24/2014   • Obstructive sleep apnea 03/09/2014      LOS (days): 3  Geometric Mean LOS (GMLOS) (days):   Days to GMLOS:     OBJECTIVE:  PATIENT READMITTED [address] of Unplanned Readmission Score: 27.9         Current admission status: Inpatient       Preferred Pharmacy:   [address] [place] [address]  [address] [place] [address]  Phone: [phone] Fax: [phone]    [address] [facility]  [address]  Phone: [phone] Fax: [phone]    Primary Care Provider: Lynne Vila MD    Primary Insurance: [facility]  Secondary Insurance:     ASSESSMENT:  [facility], [address] Landon Santos Representative - Daughter   Primary Phone: [phone] (Mobile)               Advance Directives  Does patient have a [address]?: No  Was patient offered paperwork?: Yes (declined)  Does patient have Advance Directives?: No  Was patient offered paperwork?: Yes (declined)         Readmission [facility]  30 Day Readmission: Yes  Who directed you to return to the hospital?: Family  Did you understand whom to contact if you had questions or problems?: Yes  Did you get your prescriptions before you left the hospital?: No  Reason[de-identified] Delivery service not offered  Were you able to get your prescriptions filled when you left the hospital?: Yes  Did you take your medications as prescribed?: Yes  Were you able to get to your follow-up appointments?: No  Reason[de-identified] Readmitted prior to appointment  During previous admission, was a post-acute recommendation made?: No  Patient was readmitted due to: COPD  Action Plan: Pul consulted,    Patient Information  Admitted from[de-identified] Home  Mental Status: Alert  During Assessment patient was accompanied by: Not accompanied during assessment  Assessment information provided by[de-identified] Patient  Primary Caregiver: Self  Support Systems: Self, Son, [place] of Residence: [address] do you live in?: [address] [employer] entry access options.  Select all that apply.: No steps to enter home  Type of Current Residence: [facility]  In the last 12 months, was there a time when you were not able to pay the mortgage or rent on time?: No  In the last 12 months, how many places have you lived?: 1  In the last 12 months, was there a time when you did not have a steady place to sleep or slept in a shelter (including now)?: No  Living Arrangements: Lives Alone  Is patient a ?: No    Activities of Daily Living Prior to Admission  Functional Status: Independent  Completes ADLs independently?: Yes  Does patient use assisted devices?: No  Does patient currently own DME?: Yes  What DME does the patient currently own?: Home Oxygen concentrator  Does patient have a history of Outpatient Therapy (PT/OT)?: No  Does the patient have a history of Short-Term Rehab?: No  Does patient have a history of HHC?: No  Does patient currently have [address]?: No         Patient Information Continued  Income Source: Pension/[employer]  Does patient have prescription coverage?: No  Within the past 12 months, you worried that your food would run out before you got the money to buy more.: Never true  Within the past 12 months, the food you bought just didn't last and you didn't have money to get more.: Never true  Does patient receive dialysis treatments?: No  Does patient have a history of substance abuse?: No  Does patient have a history of Mental Health Diagnosis?: Yes  Is patient receiving treatment for mental health?: Yes  Has patient received inpatient treatment related to mental health in the last 2 years?: No         Means of Transportation  Means of Transport to Appts[de-identified] Drives Self  In the past 12 months, has lack of transportation kept you from medical appointments or from getting medications?: No  In the past 12 months, has lack of transportation kept you from meetings, work, or from getting things needed for daily living?: No        DISCHARGE DETAILS:    Discharge planning discussed with[de-identified] patient  Freedom of Choice: Yes     CM contacted family/caregiver?: No- see comments (patient alert and in contact with son)                  [address]         Is the patient interested in [facility] at discharge?: Yes  [facility] requested[de-identified] Nursing, Physical [facility] Provider[de-identified] PCP  Home Health Services Needed[de-identified] COPD Management, Gait/ADL Training, Evaluate Functional Status and Safety  Oxygen LPM Ordered (if applicable based on home health services needed):: 4 LPM  Homebound Criteria Met[de-identified] Requires the Assistance of Another Person for Safe Ambulation or to Leave the Home  Supporting Clincal Findings[de-identified] Dyspnea with Exertion, Limited Endurance      Met with patient to review the role of CM and discuss any needs she may have prior to discharge. Patient is a readmission for COPD, she reports being admitted for the same. Patient resides alone in a ranch home with 0 NICOLA. She reports being independent of ADL's and has Home O2 at 4 liters. She reports no SNF./MH/D&A admission,  She is working with  [employer] [address] [place] [address]. She wants [facility], referrals to [facility] made via [employer], wait availability and patient preference. Her family will transport her home.

## 2023-08-14 NOTE — PLAN OF CARE
Problem: PAIN - ADULT  Goal: Verbalizes/displays adequate comfort level or baseline comfort level  Description: Interventions:  - Encourage patient to monitor pain and request assistance  - Assess pain using appropriate pain scale  - Administer analgesics based on type and severity of pain and evaluate response  - Implement non-pharmacological measures as appropriate and evaluate response  - Consider cultural and social influences on pain and pain management  - Notify physician/advanced practitioner if interventions unsuccessful or patient reports new pain  Outcome: Progressing     Problem: INFECTION - ADULT  Goal: Absence or prevention of progression during hospitalization  Description: INTERVENTIONS:  - Assess and monitor for signs and symptoms of infection  - Monitor lab/diagnostic results  - Monitor all insertion sites, i.e. indwelling lines, tubes, and drains  - Monitor endotracheal if appropriate and nasal secretions for changes in amount and color  - Kinsey appropriate cooling/warming therapies per order  - Administer medications as ordered  - Instruct and encourage patient and family to use good hand hygiene technique  - Identify and instruct in appropriate isolation precautions for identified infection/condition  Outcome: Progressing     Problem: SAFETY ADULT  Goal: Patient will remain free of falls  Description: INTERVENTIONS:  - Educate patient/family on patient safety including physical limitations  - Instruct patient to call for assistance with activity   - Consult OT/PT to assist with strengthening/mobility   - Keep Call bell within reach  - Keep bed low and locked with side rails adjusted as appropriate  - Keep care items and personal belongings within reach  - Initiate and maintain comfort rounds  - Make Fall Risk Sign visible to staff  - Offer Toileting every 2 Hours, in advance of need  - Obtain necessary fall risk management equipment: nonskid footwear  - Apply yellow socks and bracelet for high fall risk patients  - Consider moving patient to room near nurses station  Outcome: Progressing  Goal: Maintain or return to baseline ADL function  Description: INTERVENTIONS:  -  Assess patient's ability to carry out ADLs; assess patient's baseline for ADL function and identify physical deficits which impact ability to perform ADLs (bathing, care of mouth/teeth, toileting, grooming, dressing, etc.)  - Assess/evaluate cause of self-care deficits   - Assess range of motion  - Assess patient's mobility; develop plan if impaired  - Assess patient's need for assistive devices and provide as appropriate  - Encourage maximum independence but intervene and supervise when necessary  - Involve family in performance of ADLs  - Assess for home care needs following discharge   - Consider OT consult to assist with ADL evaluation and planning for discharge  - Provide patient education as appropriate  Outcome: Progressing     Problem: DISCHARGE PLANNING  Goal: Discharge to home or other facility with appropriate resources  Description: INTERVENTIONS:  - Identify barriers to discharge w/patient and caregiver  - Arrange for needed discharge resources and transportation as appropriate  - Identify discharge learning needs (meds, wound care, etc.)  - Arrange for interpretive services to assist at discharge as needed  - Refer to Case Management Department for coordinating discharge planning if the patient needs post-hospital services based on physician/advanced practitioner order or complex needs related to functional status, cognitive ability, or social support system  Outcome: Progressing     Problem: Knowledge Deficit  Goal: Patient/family/caregiver demonstrates understanding of disease process, treatment plan, medications, and discharge instructions  Description: Complete learning assessment and assess knowledge base.   Interventions:  - Provide teaching at level of understanding  - Provide teaching via preferred learning methods  Outcome: Progressing     Problem: RESPIRATORY - ADULT  Goal: Achieves optimal ventilation and oxygenation  Description: INTERVENTIONS:  - Assess for changes in respiratory status  - Assess for changes in mentation and behavior  - Position to facilitate oxygenation and minimize respiratory effort  - Oxygen administered by appropriate delivery if ordered  - Initiate smoking cessation education as indicated  - Encourage broncho-pulmonary hygiene including cough, deep breathe, Incentive Spirometry  - Assess the need for suctioning and aspirate as needed  - Assess and instruct to report SOB or any respiratory difficulty  - Respiratory Therapy support as indicated  Outcome: Progressing     Problem: METABOLIC, FLUID AND ELECTROLYTES - ADULT  Goal: Electrolytes maintained within normal limits  Description: INTERVENTIONS:  - Monitor labs and assess patient for signs and symptoms of electrolyte imbalances  - Administer electrolyte replacement as ordered  - Monitor response to electrolyte replacements, including repeat lab results as appropriate  - Instruct patient on fluid and nutrition as appropriate  Outcome: Progressing  Goal: Fluid balance maintained  Description: INTERVENTIONS:  - Monitor labs   - Monitor I/O and WT  - Instruct patient on fluid and nutrition as appropriate  - Assess for signs & symptoms of volume excess or deficit  Outcome: Progressing  Goal: Glucose maintained within target range  Description: INTERVENTIONS:  - Monitor Blood Glucose as ordered  - Assess for signs and symptoms of hyperglycemia and hypoglycemia  - Administer ordered medications to maintain glucose within target range  - Assess nutritional intake and initiate nutrition service referral as needed  Outcome: Progressing

## 2023-08-14 NOTE — ASSESSMENT & PLAN NOTE
Lab Results   Component Value Date    HGBA1C 9.9 (H) 05/11/2023       Recent Labs     08/14/23  0220 08/14/23  0415 08/14/23  0611 08/14/23  0802   POCGLU 295* 288* 219* 210*       Blood Sugar Average: Last 72 hrs:  (P) 296.16   • Home regimen: Metformin, Glargine 30 units daily at bedtime, glulisine 24 units with breakfast, 12 units with lunch and dinner     SSI; Subcutaneous Insulin Order Set  Blood Glucose checks TIDWM and QHS (Q6H for NPO patients)  Hold oral medications  Blood Glucose goal while inpatient is 140-180  Reduce basal insulin by 25-50% while inpatient  Consistent Carbohydrate Diet  -- Blood glucose levels highly elevated; likely secondary to high-dose steroids.   Currently on insulin drip  -- Will give patient's home dose of Lantus tonight, attempt to wean off insulin drip prior to bedtime

## 2023-08-14 NOTE — PROGRESS NOTES
Progress Note - Pulmonary   Mattie Parrish High 57 y.o. female MRN: [id number]  Unit/Bed#: -01 Encounter: [id number]    Assessment & Plan:  Acute on chronic hypoxic respiratory failure  Severe COPD with acute exacerbation  Diastolic heart failure with vascular congestion  WILMA on BiPAP  Severe pulmonary hypertension  Iron deficiency anemia  A-fib on Eliquis  Morbid obesity    · Patient currently on 5 L. Typically uses 4 L at baseline. Maintain pulse ox greater than 88%. Recommend ambulatory pulse ox prior to discharge to determine how much O2 she needs with exertion  · Transitioned to oral prednisone  · Continue Pulmicort and Perforomist nebs twice daily, Xopenex and Atrovent scheduled  · Diuretics per primary  · IV iron x3 days  · Outpatient pulmonary follow-up including [facility]  · Repeat chest x-ray today    Subjective:   Patient says her breathing is a little better. She does not have any new complaints. She has a bit of a dry cough. Objective:     Vitals: Blood pressure 131/72, pulse 71, temperature 98 °F (36.7 °C), resp. rate 16, height 5' 3" (1.6 m), weight 134 kg (296 lb 8 oz), SpO2 96 %, not currently breastfeeding. ,Body mass index is 52.52 kg/m². Intake/Output Summary (Last 24 hours) at 8/14/2023 1018  Last data filed at 8/14/2023 0940  Gross per 24 hour   Intake 1360 ml   Output 3750 ml   Net -2390 ml       Invasive Devices     Peripheral Intravenous Line  Duration           Peripheral IV 08/14/23 Dorsal (posterior); Right Hand <1 day    Peripheral IV 08/14/23 Left;Ventral (anterior) Forearm <1 day                Physical Exam:  General appearance: Alert and oriented, in no acute distress  Head: Normocephalic, without obvious abnormality, atraumatic  Eyes: EOMI. No discharge bilaterally. No scleral icterus.    Neck: Supple, symmetrical, trachea midline  Lungs: Decreased breath sounds  Heart: Regular rate and rhythm, S1, S2 normal, no murmur  Abdomen:  No appreciable distension or tenderness  Extremities: Chronic lower extremity edema  Skin: Warm and dry  Neurologic: No acute focal deficits are noted     Labs: I have personally reviewed pertinent lab results. Imaging and other studies: I have personally reviewed pertinent reports.

## 2023-08-14 NOTE — PHYSICAL THERAPY NOTE
PHYSICAL THERAPY CANCELLATION NOTE      Patient Name: Cora Skiff  RSBKH'Y Date: 8/14/2023 08/14/23 6715   Note Type   Note type Cancelled Session   Additional Comments PT orders received, chart review performed. Attempted to make contact with pt/see for either screen or eval, but pt currently on bipap while taking a nap. Did observe pt performing STS and sit to supine w/ mod I, will f/u to ask re: ambulation for potential screen.        Remigio Weir, PT, DPT

## 2023-08-14 NOTE — ASSESSMENT & PLAN NOTE
• Home regimen: Albuterol, Bevespi, Budesonide, Benralalizumab, Montelukast, Xopenex  • Presented to ED from home via EMS for SOB since Tuesday. Patient states she has been noncompliant with nebulizer treatments and BIPAP HS at home since being discharged on 7/26/23. She says she has been compliant with inhalers, oral medications and CPAP HS. • In ED placed on BIPAP then weaned to 12L [address] after receiving MERCER neb, 125mg IV solumedrol and 2g IV magnesium  • ABG on 6L oxygen: pH 7.3, pCO2 64.8, pO2 65.3, HCO3 33.7  • Received IV azithromycin and ceftriaxone in ED  • Afebrile, procal 0.05     Plan  • IV Solumedrol 40mg BID --transition to oral prednisone daily on 8/14 if continues to improve  • Xopenex/Atrovent TID  • Pulmicort BID  • Performist BID   • Procalcitonin x2 is negative  • Will monitor off futher ABX. • Respiratory protocol  • Pulmonology consult appreciated  • Patient is switched to oral prednisone by pulmonary.

## 2023-08-14 NOTE — ASSESSMENT & PLAN NOTE
• Chronically 4L oxygen NC  • In ED placed on BIPAP then weaned to 12L [address] after receiving MERCER neb, 125mg IV solumedrol and 2g IV magnesium  • ABG on 6L oxygen: pH 7.3, pCO2 64.8, pO2 65.3, HCO3 33.7  • Received IV azithromycin and ceftriaxone in ED     Plan  • Consult Pulmonology-appreciate recommendations, will add IV iron x 3 days  • Respiratory protocol  • Wean oxygen to baseline as able  • Patient is currently 14 pounds over recent outpatient weight, 1+ pitting edema bilateral lower extremities. Chest auscultation difficult given body habitus, we will trial 2 days of gentle IV diuresis to improve fluid status.   • Repeat chest x-ray today  • Wean oxygen as tolerated

## 2023-08-14 NOTE — PROGRESS NOTES
[address]  Progress Note  Name: Cheryl Lopez  MRN: [id number]  Unit/Bed#: -01 I Date of Admission: 8/11/2023   Date of Service: 8/14/2023 I Hospital Day: 3    Assessment/Plan   Class 3 severe obesity in adult [facility])  Assessment & Plan  • Encourage weight loss and lifestyle changes    Lactic acidosis  Assessment & Plan  • LA 2.4->2. 6     Plan  • Continue to trend    Chronic diastolic congestive heart failure (HCC)  Assessment & Plan  Wt Readings from Last 3 Encounters:   08/14/23 134 kg (296 lb 8 oz)   07/26/23 132 kg (291 lb 9.6 oz)   06/08/23 129 kg (284 lb)     • Patient is currently 14 pounds over her most recent outpatient weight  • Pitting edema present; lung field auscultation difficult given body habitus  • BNP 73  • Received 60mg IV lasix in ED  • On admission, was placed back on torsemide 60 mg twice daily p.o.  • ins and outs, daily weights     Plan  • Low-sodium diabetic diet  • DC IV Lasix. Will restart on torsemide 60 mg twice daily  • Strict I/O  • Daily weights  · Trend BMP    Paroxysmal atrial fibrillation (HCC)  Assessment & Plan  • Home regimen: Eliquis, metoprolol  • Currently SR in hospital     Plan  • Continue Eliquis and metoprol    Acute on chronic respiratory failure with hypoxia (HCC)  Assessment & Plan  • Chronically 4L oxygen NC  • In ED placed on BIPAP then weaned to 12L [address] after receiving MERCER neb, 125mg IV solumedrol and 2g IV magnesium  • ABG on 6L oxygen: pH 7.3, pCO2 64.8, pO2 65.3, HCO3 33.7  • Received IV azithromycin and ceftriaxone in ED     Plan  • Consult Pulmonology-appreciate recommendations, will add IV iron x 3 days  • Respiratory protocol  • Wean oxygen to baseline as able  • Patient is currently 14 pounds over recent outpatient weight, 1+ pitting edema bilateral lower extremities. Chest auscultation difficult given body habitus, we will trial 2 days of gentle IV diuresis to improve fluid status.   • Repeat chest x-ray today  • Wean oxygen as tolerated    Type 2 diabetes mellitus with stage 2 chronic kidney disease, with long-term current use of insulin [facility])  Assessment & Plan  Lab Results   Component Value Date    HGBA1C 9.9 (H) 05/11/2023       Recent Labs     08/14/23  0220 08/14/23  0415 08/14/23  0611 08/14/23  0802   POCGLU 295* 288* 219* 210*       Blood Sugar Average: Last 72 hrs:  (P) 296.16   • Home regimen: Metformin, Glargine 30 units daily at bedtime, glulisine 24 units with breakfast, 12 units with lunch and dinner     SSI; Subcutaneous Insulin Order Set  Blood Glucose checks TIDWM and QHS (Q6H for NPO patients)  Hold oral medications  Blood Glucose goal while inpatient is 140-180  Reduce basal insulin by 25-50% while inpatient  Consistent Carbohydrate Diet  -- Blood glucose levels highly elevated; likely secondary to high-dose steroids. Currently on insulin drip  -- Will give patient's home dose of Lantus tonight, attempt to wean off insulin drip prior to bedtime    Obstructive sleep apnea  Assessment & Plan  • Discharged 7/26 with Rx for nocturnal BIPAP  • Patient states she is noncompliant with BIPAP due to personal preference at home and continues to wear CPAP      Plan  • Tolerated BIPAP in hospital -->continue BIPAP HS    * Asthma with COPD with exacerbation ([address])  Assessment & Plan  • Home regimen: Albuterol, Bevespi, Budesonide, Benralalizumab, Montelukast, Xopenex  • Presented to ED from home via EMS for SOB since Tuesday. Patient states she has been noncompliant with nebulizer treatments and BIPAP HS at home since being discharged on 7/26/23. She says she has been compliant with inhalers, oral medications and CPAP HS.    • In ED placed on BIPAP then weaned to 12L [address] after receiving MERCER neb, 125mg IV solumedrol and 2g IV magnesium  • ABG on 6L oxygen: pH 7.3, pCO2 64.8, pO2 65.3, HCO3 33.7  • Received IV azithromycin and ceftriaxone in ED  • Afebrile, procal 0.05     Plan  • IV Solumedrol 40mg BID --transition to oral prednisone daily on 8/14 if continues to improve  • Xopenex/Atrovent TID  • Pulmicort BID  • Performist BID   • Procalcitonin x2 is negative  • Will monitor off futher ABX. • Respiratory protocol  • Pulmonology consult appreciated  • Patient is switched to oral prednisone by pulmonary. Labs & Imaging: I have personally reviewed pertinent reports. VTE Prophylaxis: in place. Code Status:   Level 1 - Full Code    Patient Centered Rounds: I have performed bedside rounds with nursing staff today. Discussions with Specialists or Other Care Team Provider: Pulmonary    Education and Discussions with Family / Patient: Patient declined family update    Current Length of Stay: 3 day(s)    Current Patient Status: Inpatient   Certification Statement: The patient will continue to require additional inpatient hospital stay due to see my assessment and plan. Subjective:   Patient is seen and examined at bedside. Has occasional cough. Denies any new complaints. Afebrile  All other ROS are negative. Objective:    Vitals: Blood pressure 131/72, pulse 71, temperature 98 °F (36.7 °C), resp. rate 16, height 5' 3" (1.6 m), weight 134 kg (296 lb 8 oz), SpO2 96 %, not currently breastfeeding. ,Body mass index is 52.52 kg/m². SPO2 RA Rest    Flowsheet Row ED to Hosp-Admission (Current) from 8/11/2023 in [address] [facility]   SpO2 96 %   SpO2 Activity At Rest   O2 Device Nasal cannula   O2 Flow Rate --        I&O:     Intake/Output Summary (Last 24 hours) at 8/14/2023 0939  Last data filed at 8/14/2023 0809  Gross per 24 hour   Intake 1360 ml   Output 3050 ml   Net -1690 ml       Physical Exam:    General- Alert, sitting comfortably in chair. Not in any acute distress.   Neck- Supple, No JVD  CVS- regular, S1 and S2 normal  Chest- Bilateral Air entry, decreased at bases with no wheezing  Abdomen- soft, nontender, not distended, no guarding or rigidity, BS+  Extremities-  No pedal edema, No calf tenderness  CNS-   Alert, awake and orientedx3. No focal deficits present. Invasive Devices     Peripheral Intravenous Line  Duration           Peripheral IV 08/14/23 Dorsal (posterior); Right Hand <1 day                      Social History  reviewed  Family History   Problem Relation Age of Onset   • Alzheimer's disease Mother    • Atrial fibrillation Mother    • Dementia Mother    • Heart disease Mother         heart problem   • Seizures Mother    • Parkinsonism Father    • Arthritis Father    • Atrial fibrillation Father    • No Known Problems Daughter    • Cri-du-chat syndrome Daughter    • Colon cancer Maternal Grandmother 80   • Diabetes type II Maternal Grandmother    • No Known Problems Brother    • No Known Problems Son    • Substance Abuse Neg Hx         mother,father   • Mental illness Neg Hx         mother,father   • Colon polyps Neg Hx     reviewed    Meds:  Current Facility-Administered Medications   Medication Dose Route Frequency Provider Last Rate Last Admin   • acetaminophen (TYLENOL) tablet 650 mg  650 mg Oral Q6H PRN Dahlia Simons PA-C   650 mg at 08/14/23 8540   • apixaban (ELIQUIS) tablet 5 mg  5 mg Oral BID Dahlia Simons PA-C   5 mg at 08/14/23 3745   • atorvastatin (LIPITOR) tablet 40 mg  40 mg Oral Daily Dahlia Simons PA-C   40 mg at 08/14/23 6051   • budesonide (PULMICORT) inhalation solution 0.5 mg  0.5 mg Nebulization Q12H Dahlia Simons PA-C   0.5 mg at 08/14/23 2531   • diphenhydrAMINE (BENADRYL) injection 25 mg  25 mg Intravenous Q6H PRN Samy Thayer MD   25 mg at 08/14/23 3849   • escitalopram (LEXAPRO) tablet 20 mg  20 mg Oral Daily Dahlia Simons PA-C   20 mg at 08/14/23 3530   • fluticasone (FLONASE) 50 mcg/act nasal spray 1 spray  1 spray Nasal BID Dahlia Simons PA-C   1 spray at 08/13/23 2139   • formoterol (PERFOROMIST) nebulizer solution 20 mcg  20 mcg Nebulization Q12H Dahlia Simons PA-C   20 mcg at 08/14/23 5544   • furosemide (LASIX) injection 40 mg  40 mg Intravenous BID (diuretic) Eliseo Gilbert MD   40 mg at 08/14/23 0843   • insulin glargine (LANTUS) subcutaneous injection 35 Units 0.35 mL  35 Units Subcutaneous HS Eliseo Gilbert MD   35 Units at 08/13/23 1919   • insulin regular (HumuLIN R,NovoLIN R) 1 Units/mL in sodium chloride 0.9 % 100 mL infusion  0.3-21 Units/hr Intravenous Titrated Eliot Mahan PA-C 5 mL/hr at 08/14/23 0818 5 Units/hr at 08/14/23 0818   • ipratropium (ATROVENT) 0.02 % inhalation solution 0.5 mg  0.5 mg Nebulization TID Eliot Mahan PA-C   0.5 mg at 08/14/23 8402   • iron sucrose (VENOFER) 200 mg in sodium chloride 0.9% 100 ml IVPB 200 mg  200 mg Intravenous Daily Eliseo Gilbert MD       • levalbuterol Stewart Kasandra) inhalation solution 1.25 mg  1.25 mg Nebulization TID COURTNEY DemarcoC   1.25 mg at 08/14/23 9097   • loratadine (CLARITIN) tablet 10 mg  10 mg Oral Daily Eliot Mahan PA-C   10 mg at 08/14/23 5419   • LORazepam (ATIVAN) tablet 1 mg  1 mg Oral HS COURTNEY DemarcoC   1 mg at 08/13/23 2138   • metoprolol succinate (TOPROL-XL) 24 hr tablet 100 mg  100 mg Oral Daily COURTNEY DemarcoC   100 mg at 08/14/23 0841   • montelukast (SINGULAIR) tablet 10 mg  10 mg Oral HS COURTNEY DemarcoC   10 mg at 08/13/23 2138   • nystatin (MYCOSTATIN) powder   Topical BID Eliseo Gilbert MD   Given at 08/14/23 0844   • pantoprazole (PROTONIX) EC tablet 40 mg  40 mg Oral Early Morning COURTNEY DemarcoC   40 mg at 08/14/23 6889   • predniSONE tablet 40 mg  40 mg Oral Daily Eliseo Gilbert MD   40 mg at 08/14/23 0841   • traZODone (DESYREL) tablet 150 mg  150 mg Oral HS Eliot Mahan PA-C   150 mg at 08/13/23 7867      Medications Prior to Admission   Medication   • apixaban (Eliquis) 5 mg   • atorvastatin (LIPITOR) 40 mg tablet   • escitalopram (LEXAPRO) 20 mg tablet   • fluticasone (FLONASE) 50 mcg/act nasal spray   • insulin glulisine (Apidra SoloStar) 100 units/mL injection pen   • loratadine (CLARITIN) 10 mg tablet   • LORazepam (ATIVAN) 0.5 mg tablet   • metFORMIN (GLUCOPHAGE-XR) 500 mg 24 hr tablet   • metoprolol succinate (TOPROL-XL) 100 mg 24 hr tablet   • montelukast (SINGULAIR) 10 mg tablet   • omeprazole (PriLOSEC) 40 MG capsule   • potassium chloride (Klor-Con M20) 20 mEq tablet   • torsemide (DEMADEX) 20 mg tablet   • traZODone (DESYREL) 150 mg tablet   • albuterol (2.5 mg/3 mL) 0.083 % nebulizer solution   • albuterol (PROVENTIL HFA,VENTOLIN HFA) 90 mcg/act inhaler   • Benralizumab 30 MG/ML SOAJ   • Blood Glucose Monitoring Suppl ([employer] CONTOUR NEXT MONITOR) w/Device KIT   • budesonide (PULMICORT) 0.5 mg/2 mL nebulizer solution   • Contour Next Test test strip   • CVS Lancets Thin 26G MISC   • EPINEPHrine (EPIPEN) 0.3 mg/0.3 mL SOAJ   • ferrous sulfate 220 (44 Fe) mg/5 mL solution   • glycopyrrolate-formoterol (BEVESPI AEROSPHERE) 9-4.8 MCG/ACT inhaler   • Insulin Pen Needle (BD Pen Needle Love 2nd Gen) 32G X 4 MM MISC   • levalbuterol (XOPENEX) 1.25 mg/0.5 mL nebulizer solution   • naloxone (NARCAN) 4 mg/0.1 mL nasal spray   • semaglutide, 0.25 or 0.5 mg/dose, (Ozempic, 0.25 or 0.5 MG/DOSE,) 2 mg/3 mL injection pen       Labs:  Results from last 7 days   Lab Units 08/14/23  0443 08/13/23  0513 08/12/23  0545 08/11/23  1746 08/11/23  1704   WBC Thousand/uL 13.88* 12.62* 12.68*  --  13.95*   HEMOGLOBIN g/dL 8.4* 8.4* 8.7*  --  9.2*   I STAT HEMOGLOBIN   --   --   --    < >  --    HEMATOCRIT % 29.4* 29.1* 31.1*  --  33.6*   HEMATOCRIT, ISTAT   --   --   --    < >  --    PLATELETS Thousands/uL 304 273 300  --  350   NEUTROS PCT % 87*  --   --   --  77*   LYMPHS PCT % 6*  --   --   --  13*   LYMPHO PCT %  --   --  4*  --   --    MONOS PCT % 6  --   --   --  6   MONO PCT %  --   --  1*  --   --    EOS PCT % 0  --  1  --  2    < > = values in this interval not displayed.      Results from last 7 days   Lab Units 08/14/23  0443 08/13/23  0513 08/12/23  1806 08/12/23  0545 08/11/23  1746 08/11/23  1704   POTASSIUM mmol/L 3.7 3.6 4.4   < >  --  4.9   CHLORIDE mmol/L 92* 91* 86*   < >  --  91*   CO2 mmol/L 38* 35* 30   < >  --  39*   CO2, I-STAT mmol/L  --   --   --   --  44*  --    BUN mg/dL 22 20 20   < >  --  11   CREATININE mg/dL 0.57* 0.62 0.91   < >  --  0.59*   CALCIUM mg/dL 7.8* 8.6 8.4   < >  --  8.0*   ALK PHOS U/L  --  99  --   --   --  119*   ALT U/L  --  14  --   --   --  18   AST U/L  --  8*  --   --   --  28   GLUCOSE, ISTAT mg/dl  --   --   --   --  252*  --     < > = values in this interval not displayed. Lab Results   Component Value Date    TROPONINI <0.02 06/03/2021    TROPONINI <0.02 06/03/2021    TROPONINI <0.02 06/03/2021    CKTOTAL 39 09/07/2017     Results from last 7 days   Lab Units 08/11/23  1704   INR  1.14     Lab Results   Component Value Date    BLOODCX No Growth at 48 hrs. 08/11/2023    BLOODCX No Growth at 48 hrs. 08/11/2023    BLOODCX No Growth After 5 Days. 06/03/2023    SPUTUMCULTUR 2+ Growth of 05/21/2020    SPUTUMCULTUR  05/21/2020     Commensal respiratory nicholas only; No significant growth of Staph aureus/MRSA or Pseudomonas aeruginosa. Imaging:  Results for orders placed during the hospital encounter of 08/11/23    X-ray chest 1 view portable    Narrative  CHEST    INDICATION:   sob. COMPARISON: Chest radiograph July 22, 2023    EXAM PERFORMED/VIEWS:  XR CHEST PORTABLE      FINDINGS:    Cardiomediastinal silhouette appears unremarkable. Mild prominence of the interstitial markings is similar to prior study. No pleural effusion. No pneumothorax. Osseous structures appear within normal limits for patient age. Impression  Mild pulmonary vascular congestion is similar to July 22, 2023.             Workstation performed: [id number]    Results for orders placed during the hospital encounter of 09/27/22    XR chest pa & lateral    Narrative  CHEST    INDICATION:   J44.9: Chronic obstructive pulmonary disease, unspecified  R06.02: Shortness of breath. COMPARISON:  6/5/2021    EXAM PERFORMED/VIEWS:  XR CHEST PA & LATERAL      FINDINGS:    Cardiomediastinal silhouette appears unremarkable. Mild arch calcifications again noted. Mild emphysematous changes are seen. Bilateral lower lobe hazy pulmonary infiltrates and/or subsegmental atelectasis noted. Upper to midlung fields appear adequately aerated and clear. No pneumothorax or pleural effusion. Osseous structures appear within normal limits for patient age. No free air in the upper abdomen. Impression  Mild emphysematous changes are seen. Bilateral lower lobe hazy pulmonary infiltrates and/or subsegmental atelectasis noted. Upper to midlung fields appear adequately aerated and clear.           Workstation performed: [id number]      Last 24 Hours Medication List:   Current Facility-Administered Medications   Medication Dose Route Frequency Provider Last Rate   • acetaminophen  650 mg Oral Q6H PRN Margie Mathews PA-C     • apixaban  5 mg Oral BID Margie Mathews PA-C     • atorvastatin  40 mg Oral Daily Margie Mathews PA-C     • budesonide  0.5 mg Nebulization Q12H Margie Mathews PA-C     • diphenhydrAMINE  25 mg Intravenous Q6H PRN Lan Mitchell MD     • escitalopram  20 mg Oral Daily Margie Mathews PA-C     • fluticasone  1 spray Nasal BID Margie Mathews PA-C     • formoterol  20 mcg Nebulization Q12H Margie Mathews PA-C     • furosemide  40 mg Intravenous BID (diuretic) Lan Mitchell MD     • insulin glargine  35 Units Subcutaneous HS Lan Mitchell MD     • insulin regular (HumuLIN R,NovoLIN R) 1 Units/mL in sodium chloride 0.9 % 100 mL infusion  0.3-21 Units/hr Intravenous Titrated Margie Mathews PA-C 5 Units/hr (08/14/23 0818)   • ipratropium  0.5 mg Nebulization TID Margie Mathews PA-C     • iron sucrose  200 mg Intravenous Daily Lan Mitchell MD     • levalbuterol  1.25 mg Nebulization TID Margie Mathews PA-C     • loratadine  10 mg Oral Daily Ross Nelson PA-C     • LORazepam  1 mg Oral HS Ross Nelson PA-C     • metoprolol succinate  100 mg Oral Daily Ross Nelson PA-C     • montelukast  10 mg Oral HS Ross Nelson PA-C     • nystatin   Topical BID Antolin Slaughter MD     • pantoprazole  40 mg Oral Early Morning Ross Nelson PA-C     • predniSONE  40 mg Oral Daily Antolin Slaughter MD     • traZODone  150 mg Oral HS Ross Nelson PA-C          Today, Patient Was Seen By: Marien Hodgkins, MD    ** Please Note: Dictation voice to text software may have been used in the creation of this document.  **

## 2023-08-14 NOTE — QUICK NOTE
Attempted to wean insulin gtt this evening s/p evening lantus, however glucose continued to rise despite humalog 6U. Will resume insulin gtt.

## 2023-08-15 ENCOUNTER — APPOINTMENT (INPATIENT)
Dept: NON INVASIVE DIAGNOSTICS | Facility: HOSPITAL | Age: 63
DRG: 133 | End: 2023-08-15
Payer: COMMERCIAL

## 2023-08-15 LAB
ANION GAP SERPL CALCULATED.3IONS-SCNC: 4 MMOL/L
AORTIC ROOT: 3.2 CM
APICAL FOUR CHAMBER EJECTION FRACTION: 70 %
ASCENDING AORTA: 3.4 CM
BASE EX.OXY STD BLDV CALC-SCNC: 92.7 % (ref 60–80)
BASE EXCESS BLDV CALC-SCNC: 12.3 MMOL/L
BASOPHILS # BLD AUTO: 0.02 THOUSANDS/ÂΜL (ref 0–0.1)
BASOPHILS NFR BLD AUTO: 0 % (ref 0–1)
BUN SERPL-MCNC: 16 MG/DL (ref 5–25)
CALCIUM SERPL-MCNC: 7.4 MG/DL (ref 8.4–10.2)
CHLORIDE SERPL-SCNC: 93 MMOL/L (ref 96–108)
CO2 SERPL-SCNC: 41 MMOL/L (ref 21–32)
CREAT SERPL-MCNC: 0.54 MG/DL (ref 0.6–1.3)
E WAVE DECELERATION TIME: 141 MS
EOSINOPHIL # BLD AUTO: 0.1 THOUSAND/ÂΜL (ref 0–0.61)
EOSINOPHIL NFR BLD AUTO: 1 % (ref 0–6)
ERYTHROCYTE [DISTWIDTH] IN BLOOD BY AUTOMATED COUNT: 19.4 % (ref 11.6–15.1)
FRACTIONAL SHORTENING: 36 % (ref 28–44)
GFR SERPL CREATININE-BSD FRML MDRD: 100 ML/MIN/1.73SQ M
GLUCOSE SERPL-MCNC: 123 MG/DL (ref 65–140)
GLUCOSE SERPL-MCNC: 171 MG/DL (ref 65–140)
GLUCOSE SERPL-MCNC: 230 MG/DL (ref 65–140)
GLUCOSE SERPL-MCNC: 237 MG/DL (ref 65–140)
GLUCOSE SERPL-MCNC: 243 MG/DL (ref 65–140)
GLUCOSE SERPL-MCNC: 314 MG/DL (ref 65–140)
GLUCOSE SERPL-MCNC: 315 MG/DL (ref 65–140)
HCO3 BLDV-SCNC: 36.3 MMOL/L (ref 24–30)
HCT VFR BLD AUTO: 31.3 % (ref 34.8–46.1)
HGB BLD-MCNC: 8.7 G/DL (ref 11.5–15.4)
IMM GRANULOCYTES # BLD AUTO: 0.12 THOUSAND/UL (ref 0–0.2)
IMM GRANULOCYTES NFR BLD AUTO: 1 % (ref 0–2)
INTERVENTRICULAR SEPTUM IN DIASTOLE (PARASTERNAL SHORT AXIS VIEW): 1.1 CM
INTERVENTRICULAR SEPTUM: 1.1 CM (ref 0.6–1.1)
LAAS-AP2: 20.4 CM2
LAAS-AP4: 26.3 CM2
LEFT ATRIUM SIZE: 4.3 CM
LEFT ATRIUM VOLUME (MOD BIPLANE): 75 ML
LEFT INTERNAL DIMENSION IN SYSTOLE: 3.2 CM (ref 2.1–4)
LEFT VENTRICLE DIASTOLIC VOLUME (MOD BIPLANE): 112 ML
LEFT VENTRICLE SYSTOLIC VOLUME (MOD BIPLANE): 40 ML
LEFT VENTRICULAR INTERNAL DIMENSION IN DIASTOLE: 5 CM (ref 3.5–6)
LEFT VENTRICULAR POSTERIOR WALL IN END DIASTOLE: 1.3 CM
LEFT VENTRICULAR STROKE VOLUME: 80 ML
LV EF: 64 %
LVSV (TEICH): 80 ML
LYMPHOCYTES # BLD AUTO: 2.75 THOUSANDS/ÂΜL (ref 0.6–4.47)
LYMPHOCYTES NFR BLD AUTO: 20 % (ref 14–44)
MAGNESIUM SERPL-MCNC: 2.4 MG/DL (ref 1.9–2.7)
MCH RBC QN AUTO: 19 PG (ref 26.8–34.3)
MCHC RBC AUTO-ENTMCNC: 27.8 G/DL (ref 31.4–37.4)
MCV RBC AUTO: 68 FL (ref 82–98)
MONOCYTES # BLD AUTO: 1.23 THOUSAND/ÂΜL (ref 0.17–1.22)
MONOCYTES NFR BLD AUTO: 9 % (ref 4–12)
MV E'TISSUE VEL-SEP: 11 CM/S
MV PEAK A VEL: 0.6 M/S
MV PEAK E VEL: 97 CM/S
MV STENOSIS PRESSURE HALF TIME: 41 MS
MV VALVE AREA P 1/2 METHOD: 5.37 CM2
NEUTROPHILS # BLD AUTO: 9.65 THOUSANDS/ÂΜL (ref 1.85–7.62)
NEUTS SEG NFR BLD AUTO: 69 % (ref 43–75)
NRBC BLD AUTO-RTO: 0 /100 WBCS
O2 CT BLDV-SCNC: 13.1 ML/DL
PCO2 BLDV: 44.8 MM HG (ref 42–50)
PH BLDV: 7.53 [PH] (ref 7.3–7.4)
PLATELET # BLD AUTO: 322 THOUSANDS/UL (ref 149–390)
PMV BLD AUTO: 11.1 FL (ref 8.9–12.7)
PO2 BLDV: 101.6 MM HG (ref 35–45)
POTASSIUM SERPL-SCNC: 3.3 MMOL/L (ref 3.5–5.3)
RA PRESSURE ESTIMATED: 15 MMHG
RBC # BLD AUTO: 4.58 MILLION/UL (ref 3.81–5.12)
RIGHT ATRIUM AREA SYSTOLE A4C: 20.5 CM2
RIGHT VENTRICLE ID DIMENSION: 4.1 CM
RV PSP: 61 MMHG
SL CV LEFT ATRIUM LENGTH A2C: 5.8 CM
SL CV LV EF: 65
SL CV PED ECHO LEFT VENTRICLE DIASTOLIC VOLUME (MOD BIPLANE) 2D: 121 ML
SL CV PED ECHO LEFT VENTRICLE SYSTOLIC VOLUME (MOD BIPLANE) 2D: 41 ML
SODIUM SERPL-SCNC: 138 MMOL/L (ref 135–147)
TR MAX PG: 46 MMHG
TR PEAK VELOCITY: 3.4 M/S
TRICUSPID ANNULAR PLANE SYSTOLIC EXCURSION: 2.6 CM
TRICUSPID VALVE PEAK REGURGITATION VELOCITY: 3.37 M/S
WBC # BLD AUTO: 13.87 THOUSAND/UL (ref 4.31–10.16)

## 2023-08-15 PROCEDURE — 80048 BASIC METABOLIC PNL TOTAL CA: CPT | Performed by: INTERNAL MEDICINE

## 2023-08-15 PROCEDURE — 94640 AIRWAY INHALATION TREATMENT: CPT

## 2023-08-15 PROCEDURE — 93306 TTE W/DOPPLER COMPLETE: CPT

## 2023-08-15 PROCEDURE — 99232 SBSQ HOSP IP/OBS MODERATE 35: CPT | Performed by: INTERNAL MEDICINE

## 2023-08-15 PROCEDURE — 82805 BLOOD GASES W/O2 SATURATION: CPT | Performed by: PHYSICIAN ASSISTANT

## 2023-08-15 PROCEDURE — 85025 COMPLETE CBC W/AUTO DIFF WBC: CPT | Performed by: INTERNAL MEDICINE

## 2023-08-15 PROCEDURE — 93306 TTE W/DOPPLER COMPLETE: CPT | Performed by: INTERNAL MEDICINE

## 2023-08-15 PROCEDURE — 83735 ASSAY OF MAGNESIUM: CPT | Performed by: INTERNAL MEDICINE

## 2023-08-15 PROCEDURE — 82948 REAGENT STRIP/BLOOD GLUCOSE: CPT

## 2023-08-15 PROCEDURE — 94660 CPAP INITIATION&MGMT: CPT

## 2023-08-15 PROCEDURE — 94760 N-INVAS EAR/PLS OXIMETRY 1: CPT

## 2023-08-15 RX ORDER — INSULIN GLARGINE 100 [IU]/ML
50 INJECTION, SOLUTION SUBCUTANEOUS
Status: DISCONTINUED | OUTPATIENT
Start: 2023-08-15 | End: 2023-08-21

## 2023-08-15 RX ORDER — INSULIN LISPRO 100 [IU]/ML
20 INJECTION, SOLUTION INTRAVENOUS; SUBCUTANEOUS
Status: DISCONTINUED | OUTPATIENT
Start: 2023-08-15 | End: 2023-08-21

## 2023-08-15 RX ORDER — POTASSIUM CHLORIDE 20 MEQ/1
40 TABLET, EXTENDED RELEASE ORAL ONCE
Status: COMPLETED | OUTPATIENT
Start: 2023-08-15 | End: 2023-08-15

## 2023-08-15 RX ORDER — FUROSEMIDE 10 MG/ML
80 INJECTION INTRAMUSCULAR; INTRAVENOUS
Status: DISCONTINUED | OUTPATIENT
Start: 2023-08-15 | End: 2023-08-16

## 2023-08-15 RX ADMIN — METOPROLOL SUCCINATE 100 MG: 50 TABLET, EXTENDED RELEASE ORAL at 07:41

## 2023-08-15 RX ADMIN — IPRATROPIUM BROMIDE 0.5 MG: 0.5 SOLUTION RESPIRATORY (INHALATION) at 06:04

## 2023-08-15 RX ADMIN — LEVALBUTEROL HYDROCHLORIDE 1.25 MG: 1.25 SOLUTION RESPIRATORY (INHALATION) at 06:04

## 2023-08-15 RX ADMIN — INSULIN LISPRO 12 UNITS: 100 INJECTION, SOLUTION INTRAVENOUS; SUBCUTANEOUS at 16:39

## 2023-08-15 RX ADMIN — FUROSEMIDE 80 MG: 10 INJECTION, SOLUTION INTRAMUSCULAR; INTRAVENOUS at 17:42

## 2023-08-15 RX ADMIN — INSULIN LISPRO 20 UNITS: 100 INJECTION, SOLUTION INTRAVENOUS; SUBCUTANEOUS at 16:38

## 2023-08-15 RX ADMIN — TORSEMIDE 60 MG: 20 TABLET ORAL at 07:41

## 2023-08-15 RX ADMIN — IRON SUCROSE 200 MG: 20 INJECTION, SOLUTION INTRAVENOUS at 08:04

## 2023-08-15 RX ADMIN — APIXABAN 5 MG: 5 TABLET, FILM COATED ORAL at 07:41

## 2023-08-15 RX ADMIN — LEVALBUTEROL HYDROCHLORIDE 1.25 MG: 1.25 SOLUTION RESPIRATORY (INHALATION) at 19:31

## 2023-08-15 RX ADMIN — PANTOPRAZOLE SODIUM 40 MG: 40 TABLET, DELAYED RELEASE ORAL at 05:22

## 2023-08-15 RX ADMIN — PREDNISONE 40 MG: 20 TABLET ORAL at 07:41

## 2023-08-15 RX ADMIN — IPRATROPIUM BROMIDE 0.5 MG: 0.5 SOLUTION RESPIRATORY (INHALATION) at 13:38

## 2023-08-15 RX ADMIN — INSULIN LISPRO 8 UNITS: 100 INJECTION, SOLUTION INTRAVENOUS; SUBCUTANEOUS at 12:24

## 2023-08-15 RX ADMIN — INSULIN LISPRO 8 UNITS: 100 INJECTION, SOLUTION INTRAVENOUS; SUBCUTANEOUS at 07:37

## 2023-08-15 RX ADMIN — BUDESONIDE 0.5 MG: 0.5 INHALANT ORAL at 06:04

## 2023-08-15 RX ADMIN — FLUTICASONE PROPIONATE 1 SPRAY: 50 SPRAY, METERED NASAL at 21:51

## 2023-08-15 RX ADMIN — INSULIN GLARGINE 50 UNITS: 100 INJECTION, SOLUTION SUBCUTANEOUS at 21:51

## 2023-08-15 RX ADMIN — NYSTATIN: 100000 POWDER TOPICAL at 17:53

## 2023-08-15 RX ADMIN — FLUTICASONE PROPIONATE 1 SPRAY: 50 SPRAY, METERED NASAL at 07:41

## 2023-08-15 RX ADMIN — DIPHENHYDRAMINE HYDROCHLORIDE 25 MG: 50 INJECTION, SOLUTION INTRAMUSCULAR; INTRAVENOUS at 07:47

## 2023-08-15 RX ADMIN — LORATADINE 10 MG: 10 TABLET ORAL at 07:41

## 2023-08-15 RX ADMIN — ACETAMINOPHEN 325MG 650 MG: 325 TABLET ORAL at 21:58

## 2023-08-15 RX ADMIN — APIXABAN 5 MG: 5 TABLET, FILM COATED ORAL at 17:42

## 2023-08-15 RX ADMIN — MONTELUKAST 10 MG: 10 TABLET, FILM COATED ORAL at 21:51

## 2023-08-15 RX ADMIN — IPRATROPIUM BROMIDE 0.5 MG: 0.5 SOLUTION RESPIRATORY (INHALATION) at 19:31

## 2023-08-15 RX ADMIN — TRAZODONE HYDROCHLORIDE 150 MG: 150 TABLET ORAL at 21:51

## 2023-08-15 RX ADMIN — POTASSIUM CHLORIDE 40 MEQ: 1500 TABLET, EXTENDED RELEASE ORAL at 12:26

## 2023-08-15 RX ADMIN — LEVALBUTEROL HYDROCHLORIDE 1.25 MG: 1.25 SOLUTION RESPIRATORY (INHALATION) at 13:38

## 2023-08-15 RX ADMIN — LORAZEPAM 1 MG: 1 TABLET ORAL at 21:51

## 2023-08-15 RX ADMIN — INSULIN LISPRO 18 UNITS: 100 INJECTION, SOLUTION INTRAVENOUS; SUBCUTANEOUS at 12:24

## 2023-08-15 RX ADMIN — FORMOTEROL FUMARATE DIHYDRATE 20 MCG: 20 SOLUTION RESPIRATORY (INHALATION) at 19:31

## 2023-08-15 RX ADMIN — ESCITALOPRAM OXALATE 20 MG: 20 TABLET ORAL at 07:41

## 2023-08-15 RX ADMIN — ATORVASTATIN CALCIUM 40 MG: 40 TABLET, FILM COATED ORAL at 07:41

## 2023-08-15 RX ADMIN — INSULIN LISPRO 18 UNITS: 100 INJECTION, SOLUTION INTRAVENOUS; SUBCUTANEOUS at 07:37

## 2023-08-15 RX ADMIN — NYSTATIN: 100000 POWDER TOPICAL at 07:50

## 2023-08-15 RX ADMIN — BUDESONIDE 0.5 MG: 0.5 INHALANT ORAL at 19:31

## 2023-08-15 RX ADMIN — FORMOTEROL FUMARATE DIHYDRATE 20 MCG: 20 SOLUTION RESPIRATORY (INHALATION) at 06:04

## 2023-08-15 RX ADMIN — ACETAMINOPHEN 325MG 650 MG: 325 TABLET ORAL at 07:47

## 2023-08-15 NOTE — PROGRESS NOTES
Pulmonary Progress Note  Ashley Symsonia 57 y.o. female MRN: [id number]  Unit/Bed#: -01 Encounter: [id number]      Assessment  Acute on chronic hypoxic and hypercapnic respiratory failure   Severe COPD with acute exacerbation  Diastolic heart failure, pulmonary hypertension with exacerbation  WILMA on AVAPS  Iron deficiency anemia  Atrial fibrillation on Eliquis  Morbid obesity     Recommendations:   · Follow-up echocardiogram  · Patient wore BiPAP 18/6 overnight. Venous blood gas reviewed, mixed metabolic and respiratory alkalosis  · wean O2 as able. Baseline O2 requirement 4LPM at baseline  · transitioned to PO prednisone -no significant airway wheezing  · c/w Xopenex/Atrovent TID  · diuretics per primary team -with ongoing oxygen requirements of 5 L, pitting edema, may need IV diuresis  · receiving IV iron  · I am discussing with her specialty medication coordinator on her Annabella Loft status -we may have her approved to get injections at the infusion center  · Outpatient asthma/COPD regimen - on Bevespi, nebulized albuterol, MDI albuterol at home. Chief Complaint: I feel the same    Subjective:  Patient weaned to 4 to-5 L.  SPO2 91-95% on 5L. Patient at rest has no dyspnea. She has no fever/cough/sputum. She does have shortness of breath when she is walking to the bathroom and back still. Objective:  Vitals: Vitals personally reviewed  Vitals:    08/15/23 0545 08/15/23 0604 08/15/23 0716 08/15/23 1005   BP:   132/66 131/72   Pulse:   89 70   Resp:   19    Temp:   98.1 °F (36.7 °C)    TempSrc:       SpO2:  99% 95%    Weight: 134 kg (295 lb 11.2 oz)   134 kg (296 lb)   Height:    5' 3" (1.6 m)      Body mass index is 52.43 kg/m².     Intake/Output Summary (Last 24 hours) at 8/15/2023 1341  Last data filed at 8/15/2023 1314  Gross per 24 hour   Intake 3180 ml   Output 5275 ml   Net -2095 ml     Invasive Devices       Peripheral Intravenous Line  Duration             Peripheral IV 08/14/23 Dorsal (posterior); Right Hand 1 day                    Physical Exam  General: Obese, elderly female sitting in chair, in no acute distress  HEET: head is normocephalic, atraumatic. Eyes EOMI, no scleral icterus  Neck: Supple, with full range of motion, no appreciable JVD, not using accessory muscles  CV:  Regular rhythm, +S1 S2  Lungs: Diminished breath sounds bilaterally  Abdomen: Soft, non-tender, non-distended  Extremities: No clubbing, cyanosis. 2+ pitting edema in lower extremities up to upper shins  Neuro: No focal motor/sensory deficits. Patient is oriented to self, place, month/year, and situation  Skin: Warm, No rashes or ulcerations appreciable      Labs: I have personally reviewed pertinent lab results.   Laboratory and Diagnostics  Results from last 7 days   Lab Units 08/15/23  0446 08/14/23  0443 08/13/23  0513 08/12/23  0545 08/11/23  1746 08/11/23  1704   WBC Thousand/uL 13.87* 13.88* 12.62* 12.68*  --  13.95*   HEMOGLOBIN g/dL 8.7* 8.4* 8.4* 8.7*  --  9.2*   I STAT HEMOGLOBIN g/dl  --   --   --   --  10.9*  --    HEMATOCRIT % 31.3* 29.4* 29.1* 31.1*  --  33.6*   HEMATOCRIT, ISTAT %  --   --   --   --  32*  --    PLATELETS Thousands/uL 322 304 273 300  --  350   NEUTROS PCT % 69 87*  --   --   --  77*   MONOS PCT % 9 6  --   --   --  6   MONO PCT %  --   --   --  1*  --   --    EOS PCT % 1 0  --  1  --  2     Results from last 7 days   Lab Units 08/15/23  0447 08/14/23  0443 08/13/23  0513 08/12/23  1806 08/12/23  1617 08/12/23  0545 08/11/23  1746 08/11/23  1704   SODIUM mmol/L 138 136 135 128*  --  134*  --  134*   POTASSIUM mmol/L 3.3* 3.7 3.6 4.4  --  4.2  --  4.9   CHLORIDE mmol/L 93* 92* 91* 86*  --  91*  --  91*   CO2 mmol/L 41* 38* 35* 30  --  35*  --  39*   CO2, I-STAT mmol/L  --   --   --   --   --   --  44*  --    ANION GAP mmol/L 4 6 9 12  --  8  --  4   BUN mg/dL 16 22 20 20  --  13  --  11   CREATININE mg/dL 0.54* 0.57* 0.62 0.91  --  0.58*  --  0.59*   CALCIUM mg/dL 7.4* 7.8* 8.6 8.4  -- 8.2*  --  8.0*   GLUCOSE RANDOM mg/dL 171* 269* 196* 574* 519* 403*  --  269*   ALT U/L  --   --  14  --   --   --   --  18   AST U/L  --   --  8*  --   --   --   --  28   ALK PHOS U/L  --   --  99  --   --   --   --  119*   ALBUMIN g/dL  --   --  3.9  --   --   --   --  3.8   TOTAL BILIRUBIN mg/dL  --   --  0.32  --   --   --   --  0.34     Results from last 7 days   Lab Units 08/15/23  0447 23  0513 23  0545   MAGNESIUM mg/dL 2.4 2.4 2.4   PHOSPHORUS mg/dL  --   --  4.0      Results from last 7 days   Lab Units 23  1704   INR  1.14   PTT seconds 31          Results from last 7 days   Lab Units 23  0111 23  2217 23  1950 23  1704   LACTIC ACID mmol/L 2.6* 3.0* 2.6* 2.4*     Results from last 7 days   Lab Units 23  0545   FERRITIN ng/mL 7*                 Results from last 7 days   Lab Units 23  0513 23  0545 23  1704   PROCALCITONIN ng/ml 0.06 0.05 0.05           ABG:   Results from last 7 days   Lab Units 23  0545   PH ART  7.369   PCO2 ART mm Hg 62.1*   PO2 ART mm Hg 86.1   HCO3 ART mmol/L 35.0*   BASE EXC ART mmol/L 8.3   ABG SOURCE  Radial, Left     Venous blood gas 7.5 . Mixed metabolic alkalosis and respiratory alkalosis    Imaging and other studies: I have personally reviewed pertinent films in PACS   CXR - pulmonary vascular congestion, worse, +b/l pleural effusions    CXR 23 -no consolidation, effusions, pneumothorax  CT Chest 6/3/23 -scattered areas of hyperlucency likely representing air trapping.  Innumerable small pulmonary nodules appear unchanged from at least     Pulmonary function testin/2022  Results:  FEV1/FVC Ratio: 57 %  Forced Vital Capacity:  1.08L    36 % predicted  FEV1: 0.62 L     26 % predicted  After administration of bronchodilator FEV1: 0.84 (+36%)     Lung volumes by body plethysmography: Total Lung Capacity 102 % predicted Residual volume 175 % predicted  RV/TLC ratio 171%     DLCO corrected for patients hemoglobin level: 54 %     6MWT:  Walked total of 2 mins; distance 109.6m      Echocardiogram:   10/2020•  Left Ventricle: Left ventricular cavity size is normal. Wall thickness is normal. The left ventricular ejection fraction is 60%. Systolic function is normal. Wall motion is normal. Diastolic function is moderately abnormal, consistent with grade II (pseudonormal) relaxation. •  Left Atrium: The atrium is mildly dilated. •  Tricuspid Valve: There is mild regurgitation. The right ventricular systolic pressure is mildly to moderately elevated. The estimated right ventricular systolic pressure is 43.93 mmHg. Code Status: Level 1 - Full Code      Celina Luna MD  Pulmonary, Critical Care and Sleep Medicine  [facility]     Portions of the record may have been created with voice recognition software. Occasional wrong word or "sound a like" substitutions may have occurred due to the inherent limitations of voice recognition software. Please read the chart carefully and recognize, using context, where substitutions have occurred.

## 2023-08-15 NOTE — ASSESSMENT & PLAN NOTE
• Home regimen: Albuterol, Bevespi, Budesonide, Benralalizumab, Montelukast, Xopenex  • Presented to ED from home via EMS for SOB since Tuesday. Patient states she has been noncompliant with nebulizer treatments and BIPAP HS at home since being discharged on 7/26/23. She says she has been compliant with inhalers, oral medications and CPAP HS. • In ED placed on BIPAP then weaned to 12L [address] after receiving MERCER neb, 125mg IV solumedrol and 2g IV magnesium  • ABG on 6L oxygen: pH 7.3, pCO2 64.8, pO2 65.3, HCO3 33.7  • Received IV azithromycin and ceftriaxone in ED  • Afebrile, procal 0.05     Plan  • IV Solumedrol 40mg BID --transition to oral prednisone daily on 8/14 if continues to improve  • Xopenex/Atrovent TID  • Pulmicort BID  • Performist BID   • Procalcitonin x2 is negative  • Will monitor off futher ABX.    • Respiratory protocol  • Pulmonology consult appreciated  • Patient is switched to oral prednisone by pulmonary to complete the course

## 2023-08-15 NOTE — PLAN OF CARE
Problem: PAIN - ADULT  Goal: Verbalizes/displays adequate comfort level or baseline comfort level  Description: Interventions:  - Encourage patient to monitor pain and request assistance  - Assess pain using appropriate pain scale  - Administer analgesics based on type and severity of pain and evaluate response  - Implement non-pharmacological measures as appropriate and evaluate response  - Consider cultural and social influences on pain and pain management  - Notify physician/advanced practitioner if interventions unsuccessful or patient reports new pain  Outcome: Progressing     Problem: INFECTION - ADULT  Goal: Absence or prevention of progression during hospitalization  Description: INTERVENTIONS:  - Assess and monitor for signs and symptoms of infection  - Monitor lab/diagnostic results  - Monitor all insertion sites, i.e. indwelling lines, tubes, and drains  - Monitor endotracheal if appropriate and nasal secretions for changes in amount and color  - [place] appropriate cooling/warming therapies per order  - Administer medications as ordered  - Instruct and encourage patient and family to use good hand hygiene technique  - Identify and instruct in appropriate isolation precautions for identified infection/condition  Outcome: Progressing     Problem: SAFETY ADULT  Goal: Patient will remain free of falls  Description: INTERVENTIONS:  - Educate patient/family on patient safety including physical limitations  - Instruct patient to call for assistance with activity   - Consult OT/PT to assist with strengthening/mobility   - Keep Call bell within reach  - Keep bed low and locked with side rails adjusted as appropriate  - Keep care items and personal belongings within reach  - Initiate and maintain comfort rounds  - Make Fall Risk Sign visible to staff  - Offer Toileting every x Hours, in advance of need  - Initiate/Maintain xalarm  - Obtain necessary fall risk management equipment: x  - Apply yellow socks and bracelet for high fall risk patients  - Consider moving patient to room near nurses station  Outcome: Progressing  Goal: Maintain or return to baseline ADL function  Description: INTERVENTIONS:  -  Assess patient's ability to carry out ADLs; assess patient's baseline for ADL function and identify physical deficits which impact ability to perform ADLs (bathing, care of mouth/teeth, toileting, grooming, dressing, etc.)  - Assess/evaluate cause of self-care deficits   - Assess range of motion  - Assess patient's mobility; develop plan if impaired  - Assess patient's need for assistive devices and provide as appropriate  - Encourage maximum independence but intervene and supervise when necessary  - Involve family in performance of ADLs  - Assess for home care needs following discharge   - Consider OT consult to assist with ADL evaluation and planning for discharge  - Provide patient education as appropriate  Outcome: Progressing  Goal: Maintains/Returns to pre admission functional level  Description: INTERVENTIONS:  - Perform BMAT or MOVE assessment daily.   - Set and communicate daily mobility goal to care team and patient/family/caregiver. - Collaborate with rehabilitation services on mobility goals if consulted  - Perform Range of Motion x times a day. - Reposition patient every x hours.   - Dangle patient x times a day  - Stand patient x times a day  - Ambulate patient x times a day  - Out of bed to chair x times a day   - Out of bed for meals x times a day  - Out of bed for toileting  - Record patient progress and toleration of activity level   Outcome: Progressing     Problem: DISCHARGE PLANNING  Goal: Discharge to home or other facility with appropriate resources  Description: INTERVENTIONS:  - Identify barriers to discharge w/patient and caregiver  - Arrange for needed discharge resources and transportation as appropriate  - Identify discharge learning needs (meds, wound care, etc.)  - Arrange for interpretive services to assist at discharge as needed  - Refer to Case Management Department for coordinating discharge planning if the patient needs post-hospital services based on physician/advanced practitioner order or complex needs related to functional status, cognitive ability, or social support system  Outcome: Progressing     Problem: Knowledge Deficit  Goal: Patient/family/caregiver demonstrates understanding of disease process, treatment plan, medications, and discharge instructions  Description: Complete learning assessment and assess knowledge base.   Interventions:  - Provide teaching at level of understanding  - Provide teaching via preferred learning methods  Outcome: Progressing     Problem: RESPIRATORY - ADULT  Goal: Achieves optimal ventilation and oxygenation  Description: INTERVENTIONS:  - Assess for changes in respiratory status  - Assess for changes in mentation and behavior  - Position to facilitate oxygenation and minimize respiratory effort  - Oxygen administered by appropriate delivery if ordered  - Initiate smoking cessation education as indicated  - Encourage broncho-pulmonary hygiene including cough, deep breathe, Incentive Spirometry  - Assess the need for suctioning and aspirate as needed  - Assess and instruct to report SOB or any respiratory difficulty  - Respiratory Therapy support as indicated  Outcome: Progressing     Problem: METABOLIC, FLUID AND ELECTROLYTES - ADULT  Goal: Electrolytes maintained within normal limits  Description: INTERVENTIONS:  - Monitor labs and assess patient for signs and symptoms of electrolyte imbalances  - Administer electrolyte replacement as ordered  - Monitor response to electrolyte replacements, including repeat lab results as appropriate  - Instruct patient on fluid and nutrition as appropriate  Outcome: Progressing  Goal: Fluid balance maintained  Description: INTERVENTIONS:  - Monitor labs   - Monitor I/O and WT  - Instruct patient on fluid and nutrition as appropriate  - Assess for signs & symptoms of volume excess or deficit  Outcome: Progressing  Goal: Glucose maintained within target range  Description: INTERVENTIONS:  - Monitor Blood Glucose as ordered  - Assess for signs and symptoms of hyperglycemia and hypoglycemia  - Administer ordered medications to maintain glucose within target range  - Assess nutritional intake and initiate nutrition service referral as needed  Outcome: Progressing     Problem: Nutrition/Hydration-ADULT  Goal: Nutrient/Hydration intake appropriate for improving, restoring or maintaining nutritional needs  Description: Monitor and assess patient's nutrition/hydration status for malnutrition. Collaborate with interdisciplinary team and initiate plan and interventions as ordered. Monitor patient's weight and dietary intake as ordered or per policy. Utilize nutrition screening tool and intervene as necessary. Determine patient's food preferences and provide high-protein, high-caloric foods as appropriate.      INTERVENTIONS:  - Monitor oral intake, urinary output, labs, and treatment plans  - Assess nutrition and hydration status and recommend course of action  - Evaluate amount of meals eaten  - Assist patient with eating if necessary   - Allow adequate time for meals  - Recommend/ encourage appropriate diets, oral nutritional supplements, and vitamin/mineral supplements  - Order, calculate, and assess calorie counts as needed  - Recommend, monitor, and adjust tube feedings and TPN/PPN based on assessed needs  - Assess need for intravenous fluids  - Provide specific nutrition/hydration education as appropriate  - Include patient/family/caregiver in decisions related to nutrition  Outcome: Progressing     Problem: CARDIOVASCULAR - ADULT  Goal: Maintains optimal cardiac output and hemodynamic stability  Description: INTERVENTIONS:  - Monitor I/O, vital signs and rhythm  - Monitor for S/S and trends of decreased cardiac output  - Administer and titrate ordered vasoactive medications to optimize hemodynamic stability  - Assess quality of pulses, skin color and temperature  - Assess for signs of decreased coronary artery perfusion  - Instruct patient to report change in severity of symptoms  Outcome: Progressing     Problem: GENITOURINARY - ADULT  Goal: Maintains or returns to baseline urinary function  Description: INTERVENTIONS:  - Assess urinary function  - Encourage oral fluids to ensure adequate hydration if ordered  - Administer IV fluids as ordered to ensure adequate hydration  - Administer ordered medications as needed  - Offer frequent toileting  - Follow urinary retention protocol if ordered  Outcome: Progressing     Problem: SKIN/TISSUE INTEGRITY - ADULT  Goal: Skin Integrity remains intact(Skin Breakdown Prevention)  Description: Assess:  -Perform Jt assessment every x  -Clean and moisturize skin every x  -Inspect skin when repositioning, toileting, and assisting with ADLS  -Assess under medical devices such as x every x  -Assess extremities for adequate circulation and sensation     Bed Management:  -Have minimal linens on bed & keep smooth, unwrinkled  -Change linens as needed when moist or perspiring  -Avoid sitting or lying in one position for more than x hours while in bed  -Keep HOB at [facility]     Toileting:  -Offer bedside commode  -Assess for incontinence every x  -Use incontinent care products after each incontinent episode such as x    Activity:  -Mobilize patient x times a day  -Encourage activity and walks on unit  -Encourage or provide ROM exercises   -Turn and reposition patient every x Hours  -Use appropriate equipment to lift or move patient in bed  -Instruct/ Assist with weight shifting every x when out of bed in chair  -Consider limitation of chair time x hour intervals    Skin Care:  -Avoid use of baby powder, tape, friction and shearing, hot water or constrictive clothing  -Relieve pressure over bony prominences using x  -Do not massage red bony areas    Next Steps:  -Teach patient strategies to minimize risks such as x   -Consider consults to  interdisciplinary teams such as x  Outcome: Progressing     Problem: HEMATOLOGIC - ADULT  Goal: Maintains hematologic stability  Description: INTERVENTIONS  - Assess for signs and symptoms of bleeding or hemorrhage  - Monitor labs  - Administer supportive blood products/factors as ordered and appropriate  Outcome: Progressing     Problem: MUSCULOSKELETAL - ADULT  Goal: Maintain or return mobility to safest level of function  Description: INTERVENTIONS:  - Assess patient's ability to carry out ADLs; assess patient's baseline for ADL function and identify physical deficits which impact ability to perform ADLs (bathing, care of mouth/teeth, toileting, grooming, dressing, etc.)  - Assess/evaluate cause of self-care deficits   - Assess range of motion  - Assess patient's mobility  - Assess patient's need for assistive devices and provide as appropriate  - Encourage maximum independence but intervene and supervise when necessary  - Involve family in performance of ADLs  - Assess for home care needs following discharge   - Consider OT consult to assist with ADL evaluation and planning for discharge  - Provide patient education as appropriate  Outcome: Progressing     Problem: Prexisting or High Potential for Compromised Skin Integrity  Goal: Skin integrity is maintained or improved  Description: INTERVENTIONS:  - Identify patients at risk for skin breakdown  - Assess and monitor skin integrity  - Assess and monitor nutrition and hydration status  - Monitor labs   - Assess for incontinence   - Turn and reposition patient  - Assist with mobility/ambulation  - Relieve pressure over bony prominences  - Avoid friction and shearing  - Provide appropriate hygiene as needed including keeping skin clean and dry  - Evaluate need for skin moisturizer/barrier cream  - Collaborate with interdisciplinary team - Patient/family teaching  - Consider wound care consult   Outcome: Progressing     Problem: Potential for Falls  Goal: Patient will remain free of falls  Description: INTERVENTIONS:  - Educate patient/family on patient safety including physical limitations  - Instruct patient to call for assistance with activity   - Consult OT/PT to assist with strengthening/mobility   - Keep Call bell within reach  - Keep bed low and locked with side rails adjusted as appropriate  - Keep care items and personal belongings within reach  - Initiate and maintain comfort rounds  - Make Fall Risk Sign visible to staff  - Offer Toileting every x Hours, in advance of need  - Initiate/Maintain xalarm  - Obtain necessary fall risk management equipment: x  - Apply yellow socks and bracelet for high fall risk patients  - Consider moving patient to room near nurses station  Outcome: Progressing     Problem: MOBILITY - ADULT  Goal: Maintain or return to baseline ADL function  Description: INTERVENTIONS:  -  Assess patient's ability to carry out ADLs; assess patient's baseline for ADL function and identify physical deficits which impact ability to perform ADLs (bathing, care of mouth/teeth, toileting, grooming, dressing, etc.)  - Assess/evaluate cause of self-care deficits   - Assess range of motion  - Assess patient's mobility; develop plan if impaired  - Assess patient's need for assistive devices and provide as appropriate  - Encourage maximum independence but intervene and supervise when necessary  - Involve family in performance of ADLs  - Assess for home care needs following discharge   - Consider OT consult to assist with ADL evaluation and planning for discharge  - Provide patient education as appropriate  Outcome: Progressing  Goal: Maintains/Returns to pre admission functional level  Description: INTERVENTIONS:  - Perform BMAT or MOVE assessment daily.   - Set and communicate daily mobility goal to care team and patient/family/caregiver. - Collaborate with rehabilitation services on mobility goals if consulted  - Perform Range of Motion x times a day. - Reposition patient every x hours.   - Dangle patient x times a day  - Stand patient x times a day  - Ambulate patient x times a day  - Out of bed to chair x times a day   - Out of bed for meals xxxx times a day  - Out of bed for toileting  - Record patient progress and toleration of activity level   Outcome: Progressing

## 2023-08-15 NOTE — PLAN OF CARE
Problem: PAIN - ADULT  Goal: Verbalizes/displays adequate comfort level or baseline comfort level  Description: Interventions:  - Encourage patient to monitor pain and request assistance  - Assess pain using appropriate pain scale  - Administer analgesics based on type and severity of pain and evaluate response  - Implement non-pharmacological measures as appropriate and evaluate response  - Consider cultural and social influences on pain and pain management  - Notify physician/advanced practitioner if interventions unsuccessful or patient reports new pain  Outcome: Progressing     Problem: INFECTION - ADULT  Goal: Absence or prevention of progression during hospitalization  Description: INTERVENTIONS:  - Assess and monitor for signs and symptoms of infection  - Monitor lab/diagnostic results  - Monitor all insertion sites, i.e. indwelling lines, tubes, and drains  - Monitor endotracheal if appropriate and nasal secretions for changes in amount and color  - [place] appropriate cooling/warming therapies per order  - Administer medications as ordered  - Instruct and encourage patient and family to use good hand hygiene technique  - Identify and instruct in appropriate isolation precautions for identified infection/condition  Outcome: Progressing     Problem: SAFETY ADULT  Goal: Patient will remain free of falls  Description: INTERVENTIONS:  - Educate patient/family on patient safety including physical limitations  - Instruct patient to call for assistance with activity   - Consult OT/PT to assist with strengthening/mobility   - Keep Call bell within reach  - Keep bed low and locked with side rails adjusted as appropriate  - Keep care items and personal belongings within reach  - Initiate and maintain comfort rounds  - Make Fall Risk Sign visible to staff  - Offer Toileting every x Hours, in advance of need  - Initiate/Maintain xalarm  - Obtain necessary fall risk management equipment: x  - Apply yellow socks and bracelet for high fall risk patients  - Consider moving patient to room near nurses station  Outcome: Progressing  Goal: Maintain or return to baseline ADL function  Description: INTERVENTIONS:  -  Assess patient's ability to carry out ADLs; assess patient's baseline for ADL function and identify physical deficits which impact ability to perform ADLs (bathing, care of mouth/teeth, toileting, grooming, dressing, etc.)  - Assess/evaluate cause of self-care deficits   - Assess range of motion  - Assess patient's mobility; develop plan if impaired  - Assess patient's need for assistive devices and provide as appropriate  - Encourage maximum independence but intervene and supervise when necessary  - Involve family in performance of ADLs  - Assess for home care needs following discharge   - Consider OT consult to assist with ADL evaluation and planning for discharge  - Provide patient education as appropriate  Outcome: Progressing  Goal: Maintains/Returns to pre admission functional level  Description: INTERVENTIONS:  - Perform BMAT or MOVE assessment daily.   - Set and communicate daily mobility goal to care team and patient/family/caregiver. - Collaborate with rehabilitation services on mobility goals if consulted  - Perform Range of Motion x times a day. - Reposition patient every x hours.   - Dangle patient x times a day  - Stand patient x times a day  - Ambulate patient x times a day  - Out of bed to chair x times a day   - Out of bed for meals x times a day  - Out of bed for toileting  - Record patient progress and toleration of activity level   Outcome: Progressing     Problem: DISCHARGE PLANNING  Goal: Discharge to home or other facility with appropriate resources  Description: INTERVENTIONS:  - Identify barriers to discharge w/patient and caregiver  - Arrange for needed discharge resources and transportation as appropriate  - Identify discharge learning needs (meds, wound care, etc.)  - Arrange for interpretive services to assist at discharge as needed  - Refer to Case Management Department for coordinating discharge planning if the patient needs post-hospital services based on physician/advanced practitioner order or complex needs related to functional status, cognitive ability, or social support system  Outcome: Progressing     Problem: Knowledge Deficit  Goal: Patient/family/caregiver demonstrates understanding of disease process, treatment plan, medications, and discharge instructions  Description: Complete learning assessment and assess knowledge base.   Interventions:  - Provide teaching at level of understanding  - Provide teaching via preferred learning methods  Outcome: Progressing     Problem: RESPIRATORY - ADULT  Goal: Achieves optimal ventilation and oxygenation  Description: INTERVENTIONS:  - Assess for changes in respiratory status  - Assess for changes in mentation and behavior  - Position to facilitate oxygenation and minimize respiratory effort  - Oxygen administered by appropriate delivery if ordered  - Initiate smoking cessation education as indicated  - Encourage broncho-pulmonary hygiene including cough, deep breathe, Incentive Spirometry  - Assess the need for suctioning and aspirate as needed  - Assess and instruct to report SOB or any respiratory difficulty  - Respiratory Therapy support as indicated  Outcome: Progressing     Problem: METABOLIC, FLUID AND ELECTROLYTES - ADULT  Goal: Electrolytes maintained within normal limits  Description: INTERVENTIONS:  - Monitor labs and assess patient for signs and symptoms of electrolyte imbalances  - Administer electrolyte replacement as ordered  - Monitor response to electrolyte replacements, including repeat lab results as appropriate  - Instruct patient on fluid and nutrition as appropriate  Outcome: Progressing  Goal: Fluid balance maintained  Description: INTERVENTIONS:  - Monitor labs   - Monitor I/O and WT  - Instruct patient on fluid and nutrition as appropriate  - Assess for signs & symptoms of volume excess or deficit  Outcome: Progressing  Goal: Glucose maintained within target range  Description: INTERVENTIONS:  - Monitor Blood Glucose as ordered  - Assess for signs and symptoms of hyperglycemia and hypoglycemia  - Administer ordered medications to maintain glucose within target range  - Assess nutritional intake and initiate nutrition service referral as needed  Outcome: Progressing     Problem: Nutrition/Hydration-ADULT  Goal: Nutrient/Hydration intake appropriate for improving, restoring or maintaining nutritional needs  Description: Monitor and assess patient's nutrition/hydration status for malnutrition. Collaborate with interdisciplinary team and initiate plan and interventions as ordered. Monitor patient's weight and dietary intake as ordered or per policy. Utilize nutrition screening tool and intervene as necessary. Determine patient's food preferences and provide high-protein, high-caloric foods as appropriate.      INTERVENTIONS:  - Monitor oral intake, urinary output, labs, and treatment plans  - Assess nutrition and hydration status and recommend course of action  - Evaluate amount of meals eaten  - Assist patient with eating if necessary   - Allow adequate time for meals  - Recommend/ encourage appropriate diets, oral nutritional supplements, and vitamin/mineral supplements  - Order, calculate, and assess calorie counts as needed  - Recommend, monitor, and adjust tube feedings and TPN/PPN based on assessed needs  - Assess need for intravenous fluids  - Provide specific nutrition/hydration education as appropriate  - Include patient/family/caregiver in decisions related to nutrition  Outcome: Progressing     Problem: CARDIOVASCULAR - ADULT  Goal: Maintains optimal cardiac output and hemodynamic stability  Description: INTERVENTIONS:  - Monitor I/O, vital signs and rhythm  - Monitor for S/S and trends of decreased cardiac output  - Administer and titrate ordered vasoactive medications to optimize hemodynamic stability  - Assess quality of pulses, skin color and temperature  - Assess for signs of decreased coronary artery perfusion  - Instruct patient to report change in severity of symptoms  Outcome: Progressing     Problem: GENITOURINARY - ADULT  Goal: Maintains or returns to baseline urinary function  Description: INTERVENTIONS:  - Assess urinary function  - Encourage oral fluids to ensure adequate hydration if ordered  - Administer IV fluids as ordered to ensure adequate hydration  - Administer ordered medications as needed  - Offer frequent toileting  - Follow urinary retention protocol if ordered  Outcome: Progressing     Problem: SKIN/TISSUE INTEGRITY - ADULT  Goal: Skin Integrity remains intact(Skin Breakdown Prevention)  Description: Assess:  -Perform Jt assessment every x  -Clean and moisturize skin every x  -Inspect skin when repositioning, toileting, and assisting with ADLS  -Assess under medical devices such as x every x  -Assess extremities for adequate circulation and sensation     Bed Management:  -Have minimal linens on bed & keep smooth, unwrinkled  -Change linens as needed when moist or perspiring  -Avoid sitting or lying in one position for more than x hours while in bed  -Keep HOB at [facility]     Toileting:  -Offer bedside commode  -Assess for incontinence every x  -Use incontinent care products after each incontinent episode such as x    Activity:  -Mobilize patient x times a day  -Encourage activity and walks on unit  -Encourage or provide ROM exercises   -Turn and reposition patient every x Hours  -Use appropriate equipment to lift or move patient in bed  -Instruct/ Assist with weight shifting every x when out of bed in chair  -Consider limitation of chair time x hour intervals    Skin Care:  -Avoid use of baby powder, tape, friction and shearing, hot water or constrictive clothing  -Relieve pressure over bony prominences using x  -Do not massage red bony areas    Next Steps:  -Teach patient strategies to minimize risks such as x   -Consider consults to  interdisciplinary teams such as x  Outcome: Progressing     Problem: HEMATOLOGIC - ADULT  Goal: Maintains hematologic stability  Description: INTERVENTIONS  - Assess for signs and symptoms of bleeding or hemorrhage  - Monitor labs  - Administer supportive blood products/factors as ordered and appropriate  Outcome: Progressing     Problem: MUSCULOSKELETAL - ADULT  Goal: Maintain or return mobility to safest level of function  Description: INTERVENTIONS:  - Assess patient's ability to carry out ADLs; assess patient's baseline for ADL function and identify physical deficits which impact ability to perform ADLs (bathing, care of mouth/teeth, toileting, grooming, dressing, etc.)  - Assess/evaluate cause of self-care deficits   - Assess range of motion  - Assess patient's mobility  - Assess patient's need for assistive devices and provide as appropriate  - Encourage maximum independence but intervene and supervise when necessary  - Involve family in performance of ADLs  - Assess for home care needs following discharge   - Consider OT consult to assist with ADL evaluation and planning for discharge  - Provide patient education as appropriate  Outcome: Progressing     Problem: Prexisting or High Potential for Compromised Skin Integrity  Goal: Skin integrity is maintained or improved  Description: INTERVENTIONS:  - Identify patients at risk for skin breakdown  - Assess and monitor skin integrity  - Assess and monitor nutrition and hydration status  - Monitor labs   - Assess for incontinence   - Turn and reposition patient  - Assist with mobility/ambulation  - Relieve pressure over bony prominences  - Avoid friction and shearing  - Provide appropriate hygiene as needed including keeping skin clean and dry  - Evaluate need for skin moisturizer/barrier cream  - Collaborate with interdisciplinary team - Patient/family teaching  - Consider wound care consult   Outcome: Progressing     Problem: Potential for Falls  Goal: Patient will remain free of falls  Description: INTERVENTIONS:  - Educate patient/family on patient safety including physical limitations  - Instruct patient to call for assistance with activity   - Consult OT/PT to assist with strengthening/mobility   - Keep Call bell within reach  - Keep bed low and locked with side rails adjusted as appropriate  - Keep care items and personal belongings within reach  - Initiate and maintain comfort rounds  - Make Fall Risk Sign visible to staff  - Offer Toileting every x Hours, in advance of need  - Initiate/Maintain xalarm  - Obtain necessary fall risk management equipment: x  - Apply yellow socks and bracelet for high fall risk patients  - Consider moving patient to room near nurses station  Outcome: Progressing     Problem: MOBILITY - ADULT  Goal: Maintain or return to baseline ADL function  Description: INTERVENTIONS:  -  Assess patient's ability to carry out ADLs; assess patient's baseline for ADL function and identify physical deficits which impact ability to perform ADLs (bathing, care of mouth/teeth, toileting, grooming, dressing, etc.)  - Assess/evaluate cause of self-care deficits   - Assess range of motion  - Assess patient's mobility; develop plan if impaired  - Assess patient's need for assistive devices and provide as appropriate  - Encourage maximum independence but intervene and supervise when necessary  - Involve family in performance of ADLs  - Assess for home care needs following discharge   - Consider OT consult to assist with ADL evaluation and planning for discharge  - Provide patient education as appropriate  Outcome: Progressing  Goal: Maintains/Returns to pre admission functional level  Description: INTERVENTIONS:  - Perform BMAT or MOVE assessment daily.   - Set and communicate daily mobility goal to care team and patient/family/caregiver. - Collaborate with rehabilitation services on mobility goals if consulted  - Perform Range of Motion x times a day. - Reposition patient every x hours.   - Dangle patient x times a day  - Stand patient x times a day  - Ambulate patient x times a day  - Out of bed to chair x times a day   - Out of bed for meals x times a day  - Out of bed for toileting  - Record patient progress and toleration of activity level   Outcome: Progressing

## 2023-08-15 NOTE — ASSESSMENT & PLAN NOTE
Wt Readings from Last 3 Encounters:   08/15/23 134 kg (296 lb)   07/26/23 132 kg (291 lb 9.6 oz)   06/08/23 129 kg (284 lb)     • Patient is currently 14 pounds over her most recent outpatient weight  • Pitting edema present; lung field auscultation difficult given body habitus  • BNP 73  • Received 60mg IV lasix in ED  • On admission, was placed back on torsemide 60 mg twice daily p.o.  • ins and outs, daily weights     Plan  • Low-sodium diabetic diet  • DC IV Lasix.   Continue on torsemide 60 mg twice daily  • Strict I/O  • Daily weights  · Trend BMP

## 2023-08-15 NOTE — PROGRESS NOTES
[address] [facility]  Progress Note  Name: Young Garcia  MRN: [id number]  Unit/Bed#: -01 I Date of Admission: 8/11/2023   Date of Service: 8/15/2023 I Hospital Day: 4    Assessment/Plan   Class 3 severe obesity in adult [facility])  Assessment & Plan  • Encourage weight loss and lifestyle changes    Lactic acidosis  Assessment & Plan  • LA 2.4->2. 6     Plan  • Continue to trend    Chronic diastolic congestive heart failure (HCC)  Assessment & Plan  Wt Readings from Last 3 Encounters:   08/15/23 134 kg (296 lb)   07/26/23 132 kg (291 lb 9.6 oz)   06/08/23 129 kg (284 lb)     • Patient is currently 14 pounds over her most recent outpatient weight  • Pitting edema present; lung field auscultation difficult given body habitus  • BNP 73  • Received 60mg IV lasix in ED  • On admission, was placed back on torsemide 60 mg twice daily p.o.  • ins and outs, daily weights     Plan  • Low-sodium diabetic diet  • DC IV Lasix. Continue on torsemide 60 mg twice daily  • Strict I/O  • Daily weights  · Trend BMP    Paroxysmal atrial fibrillation (HCC)  Assessment & Plan  • Home regimen: Eliquis, metoprolol  • Currently SR in hospital     Plan  • Continue Eliquis and metoprol    Acute on chronic respiratory failure with hypoxia (HCC)  Assessment & Plan  • Chronically 4L oxygen NC  • In ED placed on BIPAP then weaned to 12L [address] after receiving MERCER neb, 125mg IV solumedrol and 2g IV magnesium  • ABG on 6L oxygen: pH 7.3, pCO2 64.8, pO2 65.3, HCO3 33.7  • Received IV azithromycin and ceftriaxone in ED     Plan  • Consult Pulmonology-appreciate recommendations, will add IV iron x 3 days  • Respiratory protocol  • Wean oxygen to baseline as able  • Patient is currently 14 pounds over recent outpatient weight, 1+ pitting edema bilateral lower extremities. Chest auscultation difficult given body habitus, we will trial 2 days of gentle IV diuresis to improve fluid status.   • Wean oxygen as tolerated    Type 2 diabetes mellitus with stage 2 chronic kidney disease, with long-term current use of insulin [facility])  Assessment & Plan  Lab Results   Component Value Date    HGBA1C 9.9 (H) 05/11/2023       Recent Labs     08/14/23  2218 08/15/23  0014 08/15/23  0713 08/15/23  1117   POCGLU 171* 123 237* 230*       Blood Sugar Average: Last 72 hrs:  (P) [id number]   • Home regimen: Metformin, Glargine 30 units daily at bedtime, glulisine 24 units with breakfast, 12 units with lunch and dinner     SSI; Subcutaneous Insulin Order Set  Blood Glucose checks TIDWM and QHS (Q6H for NPO patients)  Hold oral medications  Blood Glucose goal while inpatient is 140-180  Reduce basal insulin by 25-50% while inpatient  Consistent Carbohydrate Diet  -- Blood glucose levels highly elevated; likely secondary to high-dose steroids. Currently on insulin drip  -- Was tapered off insulin drip. Continue on Lantus insulin and Humalog with meals and sliding scale    Obstructive sleep apnea  Assessment & Plan  • Discharged 7/26 with Rx for nocturnal BIPAP  • Patient states she is noncompliant with BIPAP due to personal preference at home and continues to wear CPAP      Plan  • Tolerated BIPAP in hospital -->continue BIPAP HS    * Asthma with COPD with exacerbation ([address])  Assessment & Plan  • Home regimen: Albuterol, Bevespi, Budesonide, Benralalizumab, Montelukast, Xopenex  • Presented to ED from home via EMS for SOB since Tuesday. Patient states she has been noncompliant with nebulizer treatments and BIPAP HS at home since being discharged on 7/26/23. She says she has been compliant with inhalers, oral medications and CPAP HS.    • In ED placed on BIPAP then weaned to 12L [address] after receiving MERCER neb, 125mg IV solumedrol and 2g IV magnesium  • ABG on 6L oxygen: pH 7.3, pCO2 64.8, pO2 65.3, HCO3 33.7  • Received IV azithromycin and ceftriaxone in ED  • Afebrile, procal 0.05     Plan  • IV Solumedrol 40mg BID --transition to oral prednisone daily on 8/14 if continues to improve  • Xopenex/Atrovent TID  • Pulmicort BID  • Performist BID   • Procalcitonin x2 is negative  • Will monitor off futher ABX. • Respiratory protocol  • Pulmonology consult appreciated  • Patient is switched to oral prednisone by pulmonary to complete the course         Labs & Imaging: I have personally reviewed pertinent reports. VTE Prophylaxis: in place. Code Status:   Level 1 - Full Code    Patient Centered Rounds: I have performed bedside rounds with nursing staff today. Discussions with Specialists or Other Care Team Provider: CM    Education and Discussions with Family / Patient: Patient declined family update    Current Length of Stay: 4 day(s)    Current Patient Status: Inpatient   Certification Statement: The patient will continue to require additional inpatient hospital stay due to see my assessment and plan. Subjective:   Patient is seen and examined at bedside. Denies any new complaints. Afebrile  All other ROS are negative. Objective:    Vitals: Blood pressure 131/72, pulse 70, temperature 98.1 °F (36.7 °C), resp. rate 19, height 5' 3" (1.6 m), weight 134 kg (296 lb), SpO2 95 %, not currently breastfeeding. ,Body mass index is 52.43 kg/m². SPO2 RA Rest    Flowsheet Row ED to Hosp-Admission (Current) from 8/11/2023 in [facility]   SpO2 95 %   SpO2 Activity At Rest   O2 Device Nasal cannula   O2 Flow Rate --        I&O:     Intake/Output Summary (Last 24 hours) at 8/15/2023 1209  Last data filed at 8/15/2023 1100  Gross per 24 hour   Intake 2820 ml   Output 2875 ml   Net -55 ml       Physical Exam:    General- Alert, lying comfortably in bed. Not in any acute distress. Neck- Supple, No JVD  CVS- regular, S1 and S2 normal  Chest- Bilateral Air entry, No wheezing present.   Abdomen- soft, nontender, not distended, no guarding or rigidity, BS+  Extremities-  No pedal edema, No calf tenderness  CNS-   Alert, awake and orientedx3. No focal deficits present. Invasive Devices     Peripheral Intravenous Line  Duration           Peripheral IV 08/14/23 Dorsal (posterior); Right Hand 1 day                      Social History  reviewed  Family History   Problem Relation Age of Onset   • Alzheimer's disease Mother    • Atrial fibrillation Mother    • Dementia Mother    • Heart disease Mother         heart problem   • Seizures Mother    • Parkinsonism Father    • Arthritis Father    • Atrial fibrillation Father    • No Known Problems Daughter    • Cri-du-chat syndrome Daughter    • Colon cancer Maternal Grandmother 80   • Diabetes type II Maternal Grandmother    • No Known Problems Brother    • No Known Problems Son    • Substance Abuse Neg Hx         mother,father   • Mental illness Neg Hx         mother,father   • Colon polyps Neg Hx     reviewed    Meds:  Current Facility-Administered Medications   Medication Dose Route Frequency Provider Last Rate Last Admin   • acetaminophen (TYLENOL) tablet 650 mg  650 mg Oral Q6H PRN Cristobal Pancake, PA-C   650 mg at 08/15/23 0747   • apixaban (ELIQUIS) tablet 5 mg  5 mg Oral BID Cristobal Pancake, PA-C   5 mg at 08/15/23 0741   • atorvastatin (LIPITOR) tablet 40 mg  40 mg Oral Daily Cristobal Pancake, PA-C   40 mg at 08/15/23 0741   • budesonide (PULMICORT) inhalation solution 0.5 mg  0.5 mg Nebulization Q12H Cristobal Pancake, PA-C   0.5 mg at 08/15/23 0604   • diphenhydrAMINE (BENADRYL) injection 25 mg  25 mg Intravenous Q6H PRN Laurie Goncalves MD   25 mg at 08/15/23 0747   • escitalopram (LEXAPRO) tablet 20 mg  20 mg Oral Daily Cristobal Pancake, PA-C   20 mg at 08/15/23 0741   • fluticasone (FLONASE) 50 mcg/act nasal spray 1 spray  1 spray Nasal BID Cristobal Pancake, PA-C   1 spray at 08/15/23 0741   • formoterol (PERFOROMIST) nebulizer solution 20 mcg  20 mcg Nebulization Q12H Cristobal Pancake, PA-C   20 mcg at 08/15/23 0604   • insulin glargine (LANTUS) subcutaneous injection 45 Units 0.45 mL  45 Units Subcutaneous HS Mert Whitaker MD   45 Units at 08/14/23 2226   • insulin lispro (HumaLOG) 100 units/mL subcutaneous injection 18 Units  18 Units Subcutaneous TID With Meals Mert Whitaker MD   18 Units at 08/15/23 0737   • insulin lispro (HumaLOG) 100 units/mL subcutaneous injection 4-20 Units  4-20 Units Subcutaneous TID [facility] [place] Mert Whitaker MD   8 Units at 08/15/23 0737   • ipratropium (ATROVENT) 0.02 % inhalation solution 0.5 mg  0.5 mg Nebulization TID Halima Bardales PA-C   0.5 mg at 08/15/23 3321   • iron sucrose (VENOFER) 200 mg in sodium chloride 0.9% 100 ml IVPB 200 mg  200 mg Intravenous Daily Brian Núñez MD   200 mg at 08/15/23 0804   • levalbuterol (XOPENEX) inhalation solution 1.25 mg  1.25 mg Nebulization TID Halima Bardales PA-C   1.25 mg at 08/15/23 0604   • loratadine (CLARITIN) tablet 10 mg  10 mg Oral Daily Halima Bardales PA-C   10 mg at 08/15/23 0741   • LORazepam (ATIVAN) tablet 1 mg  1 mg Oral HS Halima Bardales PA-C   1 mg at 08/14/23 2129   • metoprolol succinate (TOPROL-XL) 24 hr tablet 100 mg  100 mg Oral Daily Halima Bardales PA-C   100 mg at 08/15/23 0741   • montelukast (SINGULAIR) tablet 10 mg  10 mg Oral HS Halima Bardales PA-C   10 mg at 08/14/23 2129   • nystatin (MYCOSTATIN) powder   Topical BID Brian Núñez MD   Given at 08/15/23 0750   • pantoprazole (PROTONIX) EC tablet 40 mg  40 mg Oral Early Morning Halima Bardales PA-C   40 mg at 08/15/23 0522   • potassium chloride (K-DUR,KLOR-CON) CR tablet 40 mEq  40 mEq Oral Once Mert Whitaker MD       • predniSONE tablet 40 mg  40 mg Oral Daily Brian Núñez MD   40 mg at 08/15/23 0741   • torsemide (DEMADEX) tablet 60 mg  60 mg Oral BID Mert Whitaker MD   60 mg at 08/15/23 0741   • traZODone (DESYREL) tablet 150 mg  150 mg Oral HS Halima Bardales PA-C   150 mg at 08/14/23 5914      Medications Prior to Admission   Medication   • apixaban (Eliquis) 5 mg   • atorvastatin (LIPITOR) 40 mg tablet   • escitalopram (LEXAPRO) 20 mg tablet   • fluticasone (FLONASE) 50 mcg/act nasal spray   • insulin glulisine (Apidra SoloStar) 100 units/mL injection pen   • loratadine (CLARITIN) 10 mg tablet   • LORazepam (ATIVAN) 0.5 mg tablet   • metFORMIN (GLUCOPHAGE-XR) 500 mg 24 hr tablet   • metoprolol succinate (TOPROL-XL) 100 mg 24 hr tablet   • montelukast (SINGULAIR) 10 mg tablet   • omeprazole (PriLOSEC) 40 MG capsule   • potassium chloride (Klor-Con M20) 20 mEq tablet   • torsemide (DEMADEX) 20 mg tablet   • traZODone (DESYREL) 150 mg tablet   • albuterol (2.5 mg/3 mL) 0.083 % nebulizer solution   • albuterol (PROVENTIL HFA,VENTOLIN HFA) 90 mcg/act inhaler   • Benralizumab 30 MG/ML SOAJ   • Blood Glucose Monitoring Suppl ([employer] CONTOUR NEXT MONITOR) w/Device KIT   • Contour Next Test test strip   • CVS Lancets Thin 26G MISC   • EPINEPHrine (EPIPEN) 0.3 mg/0.3 mL SOAJ   • ferrous sulfate 220 (44 Fe) mg/5 mL solution   • glycopyrrolate-formoterol (BEVESPI AEROSPHERE) 9-4.8 MCG/ACT inhaler   • Insulin Pen Needle (BD Pen Needle Love 2nd Gen) 32G X 4 MM MISC   • levalbuterol (XOPENEX) 1.25 mg/0.5 mL nebulizer solution   • naloxone (NARCAN) 4 mg/0.1 mL nasal spray   • semaglutide, 0.25 or 0.5 mg/dose, (Ozempic, 0.25 or 0.5 MG/DOSE,) 2 mg/3 mL injection pen       Labs:  Results from last 7 days   Lab Units 08/15/23  0446 08/14/23  0443 08/13/23  0513 08/12/23  0545 08/11/23  1746 08/11/23  1704   WBC Thousand/uL 13.87* 13.88* 12.62* 12.68*  --  13.95*   HEMOGLOBIN g/dL 8.7* 8.4* 8.4* 8.7*  --  9.2*   I STAT HEMOGLOBIN   --   --   --   --    < >  --    HEMATOCRIT % 31.3* 29.4* 29.1* 31.1*  --  33.6*   HEMATOCRIT, ISTAT   --   --   --   --    < >  --    PLATELETS Thousands/uL 322 304 273 300  --  350   NEUTROS PCT % 69 87*  --   --   --  77*   LYMPHS PCT % 20 6*  --   --   --  13*   LYMPHO PCT %  --   --   --  4*  --   --    MONOS PCT % 9 6  --   --   --  6   MONO PCT %  --   --   --  1*  --   --    EOS PCT % 1 0  --  1  --  2    < > = values in this interval not displayed. Results from last 7 days   Lab Units 08/15/23  0447 08/14/23  0443 08/13/23  0513 08/12/23  0545 08/11/23  1746 08/11/23  1704   POTASSIUM mmol/L 3.3* 3.7 3.6   < >  --  4.9   CHLORIDE mmol/L 93* 92* 91*   < >  --  91*   CO2 mmol/L 41* 38* 35*   < >  --  39*   CO2, I-STAT mmol/L  --   --   --   --  44*  --    BUN mg/dL 16 22 20   < >  --  11   CREATININE mg/dL 0.54* 0.57* 0.62   < >  --  0.59*   CALCIUM mg/dL 7.4* 7.8* 8.6   < >  --  8.0*   ALK PHOS U/L  --   --  99  --   --  119*   ALT U/L  --   --  14  --   --  18   AST U/L  --   --  8*  --   --  28   GLUCOSE, ISTAT mg/dl  --   --   --   --  252*  --     < > = values in this interval not displayed. Lab Results   Component Value Date    TROPONINI <0.02 06/03/2021    TROPONINI <0.02 06/03/2021    TROPONINI <0.02 06/03/2021    CKTOTAL 39 09/07/2017     Results from last 7 days   Lab Units 08/11/23  1704   INR  1.14     Lab Results   Component Value Date    BLOODCX No Growth at 72 hrs. 08/11/2023    BLOODCX No Growth at 72 hrs. 08/11/2023    BLOODCX No Growth After 5 Days. 06/03/2023    SPUTUMCULTUR 2+ Growth of 05/21/2020    SPUTUMCULTUR  05/21/2020     Commensal respiratory nicholas only; No significant growth of Staph aureus/MRSA or Pseudomonas aeruginosa. Imaging:  Results for orders placed during the hospital encounter of 08/11/23    XR chest portable    Narrative  CHEST    INDICATION:   Shortness of breath and cough. .    COMPARISON: Chest radiograph 8/11/2023.; 9/27/2022    EXAM PERFORMED/VIEWS:  XR CHEST PORTABLE  Images:  1    FINDINGS:    Cardiomediastinal silhouette appears unremarkable. Bilateral prominent interstitial markings are slightly worsened compared to prior study. Slightly worsening pulmonary edema. There is new blunting of the left costophrenic angle, concerning for trace effusion.     Osseous structures appear within normal limits for patient age.    Impression  Pulmonary vascular congestion is worsened since prior exam on 8/11/2023 with new blunting of the costophrenic angles concerning for bilateral effusions. The study was marked in [facility] for immediate notification. I have personally reviewed this study including all images.  / MONICA Resident: Cathryn Mccollum, the attending radiologist, have reviewed the images and agree with the final report above. Workstation performed: [id number]    Results for orders placed during the hospital encounter of 09/27/22    XR chest pa & lateral    Narrative  CHEST    INDICATION:   J44.9: Chronic obstructive pulmonary disease, unspecified  R06.02: Shortness of breath. COMPARISON:  6/5/2021    EXAM PERFORMED/VIEWS:  XR CHEST PA & LATERAL      FINDINGS:    Cardiomediastinal silhouette appears unremarkable. Mild arch calcifications again noted. Mild emphysematous changes are seen. Bilateral lower lobe hazy pulmonary infiltrates and/or subsegmental atelectasis noted. Upper to midlung fields appear adequately aerated and clear. No pneumothorax or pleural effusion. Osseous structures appear within normal limits for patient age. No free air in the upper abdomen. Impression  Mild emphysematous changes are seen. Bilateral lower lobe hazy pulmonary infiltrates and/or subsegmental atelectasis noted. Upper to midlung fields appear adequately aerated and clear.           Workstation performed: [id number]      Last 24 Hours Medication List:   Current Facility-Administered Medications   Medication Dose Route Frequency Provider Last Rate   • acetaminophen  650 mg Oral Q6H PRN Napolean ROSALIO Lopes     • apixaban  5 mg Oral BID Jesikaan ROSALIO Lopes     • atorvastatin  40 mg Oral Daily Napolean ROSALIO Lopes     • budesonide  0.5 mg Nebulization Q12H Napolean ROSALIO Lopes     • diphenhydrAMINE  25 mg Intravenous Q6H PRN Jorge Baer MD     • escitalopram  20 mg Oral Daily Itzel MARTIN Az Koehler PA-C     • fluticasone  1 spray Nasal BID Romelia Rosas PA-C     • formoterol  20 mcg Nebulization Q12H Romelia Rosas PA-C     • insulin glargine  45 Units Subcutaneous HS Iza Bone MD     • insulin lispro  18 Units Subcutaneous TID With Meals Iza Bone MD     • insulin lispro  4-20 Units Subcutaneous TID AC Iza Bone MD     • ipratropium  0.5 mg Nebulization TID Romelia Rosas PA-C     • iron sucrose  200 mg Intravenous Daily Mayco Valiente MD     • levalbuterol  1.25 mg Nebulization TID Romelia Rosas PA-C     • loratadine  10 mg Oral Daily Romelia Rosas PA-C     • LORazepam  1 mg Oral HS Romelia Rosas PA-C     • metoprolol succinate  100 mg Oral Daily Romelia Rosas PA-C     • montelukast  10 mg Oral HS Romelia Rosas PA-C     • nystatin   Topical BID Mayco Valiente MD     • pantoprazole  40 mg Oral Early Morning Romelia Rosas PA-C     • potassium chloride  40 mEq Oral Once Iza Bone MD     • predniSONE  40 mg Oral Daily Mayco Valiente MD     • torsemide  60 mg Oral BID Iza Bone MD     • traZODone  150 mg Oral HS Romelia Rosas PA-C          Today, Patient Was Seen By: Iza Bone MD    ** Please Note: Dictation voice to text software may have been used in the creation of this document.  **

## 2023-08-15 NOTE — CASE MANAGEMENT
Case Management Discharge Planning Note    Patient name Lorena Thurston  Location /-69 MRN [id number]  : 1960 Date 8/15/2023       Current Admission Date: 2023  Current Admission Diagnosis:Asthma with COPD with exacerbation [facility])   Patient Active Problem List    Diagnosis Date Noted   • Eosinophilic asthma    • COPD, severe ([address]) 2023   • Hepatic steatosis 2023   • Morbid obesity ([address]) 02/15/2023   • Diabetic polyneuropathy associated with type 2 diabetes mellitus ([address]) 11/15/2021   • Type 2 diabetes mellitus with hyperglycemia ([address]) 2021   • Tubular adenoma of colon 2021   • Lower gastrointestinal bleeding 2021   • Transaminitis 2021   • Gastric polyp 2021   • Class 3 severe obesity in adult [facility]) 2021   • Pulmonary hypertension ([address]) 2020   • Asthma with COPD with exacerbation ([address]) 2020   • Chronic hypercapnic respiratory failure ([address]) 2020   • Insomnia 10/28/2020   • Abnormal CT of the chest 2020   • Asthma-COPD overlap syndrome ([address]) 2020   • Lactic acidosis 2020   • Chronic diastolic congestive heart failure ([address]) 2020   • Microcytic anemia 2020   • Neck pain, chronic 2019   • Current moderate episode of major depressive disorder without prior episode ([address]) 2019   • Paroxysmal atrial fibrillation ([address]) 2018   • Severe persistent asthma 2018   • Acute on chronic respiratory failure with hypoxia ([address]) 2018   • Abnormal computed tomography angiography (CTA) of abdomen 2018   • Abnormal CT of the abdomen 2018   • Iron deficiency anemia due to chronic blood loss 2018   • Type 2 diabetes mellitus with stage 2 chronic kidney disease, with long-term current use of insulin ([address])    • Severe persistent asthma with exacerbation 2018   • Ganglion cyst of flexor tendon sheath 2017   • Paresthesia of upper extremity 2017   • Cervical radiculopathy 09/13/2017   • Elevated liver enzymes 12/30/2015   • Sexual dysfunction 11/11/2014   • Chronic low back pain 10/15/2014   • Hypercholesterolemia 09/09/2014   • Lumbar radiculopathy 09/09/2014   • Esophageal reflux 06/23/2014   • Hypertensive heart and chronic kidney disease with heart failure and stage 1 through stage 4 chronic kidney disease, or chronic kidney disease ([address] [facility]) 03/24/2014   • Obstructive sleep apnea 03/09/2014      LOS (days): 4  Geometric Mean LOS (GMLOS) (days):   Days to GMLOS:     OBJECTIVE:  Risk of Unplanned Readmission Score: 25.63         Current admission status: Inpatient   Preferred Pharmacy:   [id number] [address]  [address] [address]  Phone: [phone] Fax: [phone]    [address] [place] [address] [address] [place] [address] Drive  Phone: [phone] Fax: [phone]    Primary Care Provider: Myesha Maria MD    Primary Insurance: [employer]  Secondary Insurance:     DISCHARGE DETAILS:     List of accepting [facility] agencies given to patient. Tonja Bonilla [facility] was only accepting [facility]. Patient is agreeable to [facility] [place] [facility]. Reserved on Aidin and their contact information added to patient's AVS. .                    [facility] Agency Name[de-identified] [facility] External Referral Reason (only applicable if external HHA name selected): [facility] does not accept patient's insurance

## 2023-08-15 NOTE — PHYSICAL THERAPY NOTE
PHYSICAL THERAPY SCREEN NOTE      Patient Name: Nicholas Edouard  HQWPT'E Date: 8/15/2023       08/15/23 3697   Note Type   Note type Screen   Additional Comments PT orders received, chart review performed. Witnessed pt ambulating out of bathroom earlier independently. contact made w/ pt who confirms no difficulty w/ mobility and no anticipated needs upon DC.  Pt w/ no acute PT needs, will DC PT ordres       Daniel Cruz, PT, DPT

## 2023-08-15 NOTE — PROGRESS NOTES
-- Patient: Yazmin Dean  -- MRN: [id number]  -- Aidin Request ID: [id number]  -- Level of care reserved: [address]  -- Partner Reserved: [address] Donna Cutler, [address] [facility] [phone]  -- Clinical needs requested:  -- Geography searched: [id number]  -- Start of Service:  -- Request sent: 2:46pm EDT on 8/14/2023 by Miky Yee  -- Partner reserved: 12:48pm EDT on 8/15/2023 by Miky Yee  -- Choice list shared: 7:40am EDT on 8/15/2023 by Miky Yee

## 2023-08-15 NOTE — ASSESSMENT & PLAN NOTE
• Chronically 4L oxygen NC  • In ED placed on BIPAP then weaned to 12L [address] after receiving MERCER neb, 125mg IV solumedrol and 2g IV magnesium  • ABG on 6L oxygen: pH 7.3, pCO2 64.8, pO2 65.3, HCO3 33.7  • Received IV azithromycin and ceftriaxone in ED     Plan  • Consult Pulmonology-appreciate recommendations, will add IV iron x 3 days  • Respiratory protocol  • Wean oxygen to baseline as able  • Patient is currently 14 pounds over recent outpatient weight, 1+ pitting edema bilateral lower extremities. Chest auscultation difficult given body habitus, we will trial 2 days of gentle IV diuresis to improve fluid status.   • Wean oxygen as tolerated

## 2023-08-15 NOTE — OCCUPATIONAL THERAPY NOTE
Occupational Therapy Screen Note     Patient Name: Elle Phillips  Today's Date: 8/15/2023  Problem List  Principal Problem:    Asthma with COPD with exacerbation ([address])  Active Problems:    Obstructive sleep apnea    Type 2 diabetes mellitus with stage 2 chronic kidney disease, with long-term current use of insulin (HCC)    Acute on chronic respiratory failure with hypoxia (HCC)    Paroxysmal atrial fibrillation (HCC)    Chronic diastolic congestive heart failure (HCC)    Lactic acidosis    Class 3 severe obesity in adult [facility])            08/15/23 1347   OT Last Visit   OT Visit Date 08/15/23   Note Type   Note type Screen   Additional Comments OT orders received, chart review performed. Witnessed pt ambulating out of bathroom earlier independently. Pt reports no therapy needs at this time as she is independent.  Pt w/ no acute OT needs, will DC OT ordariadna Yi, OTR/L

## 2023-08-15 NOTE — ASSESSMENT & PLAN NOTE
Lab Results   Component Value Date    HGBA1C 9.9 (H) 05/11/2023       Recent Labs     08/14/23  2218 08/15/23  0014 08/15/23  0713 08/15/23  1117   POCGLU 171* 123 237* 230*       Blood Sugar Average: Last 72 hrs:  (P) [id number]   • Home regimen: Metformin, Glargine 30 units daily at bedtime, glulisine 24 units with breakfast, 12 units with lunch and dinner     SSI; Subcutaneous Insulin Order Set  Blood Glucose checks TIDWM and QHS (Q6H for NPO patients)  Hold oral medications  Blood Glucose goal while inpatient is 140-180  Reduce basal insulin by 25-50% while inpatient  Consistent Carbohydrate Diet  -- Blood glucose levels highly elevated; likely secondary to high-dose steroids. Currently on insulin drip  -- Was tapered off insulin drip.   Continue on Lantus insulin and Humalog with meals and sliding scale

## 2023-08-16 ENCOUNTER — APPOINTMENT (INPATIENT)
Dept: RADIOLOGY | Facility: HOSPITAL | Age: 63
DRG: 133 | End: 2023-08-16
Payer: COMMERCIAL

## 2023-08-16 PROBLEM — I50.33 ACUTE ON CHRONIC DIASTOLIC CONGESTIVE HEART FAILURE (HCC): Status: ACTIVE | Noted: 2020-05-21

## 2023-08-16 LAB
ANION GAP SERPL CALCULATED.3IONS-SCNC: 6 MMOL/L
BASOPHILS # BLD AUTO: 0.03 THOUSANDS/ÂΜL (ref 0–0.1)
BASOPHILS NFR BLD AUTO: 0 % (ref 0–1)
BUN SERPL-MCNC: 12 MG/DL (ref 5–25)
CALCIUM SERPL-MCNC: 7.4 MG/DL (ref 8.4–10.2)
CHLORIDE SERPL-SCNC: 93 MMOL/L (ref 96–108)
CO2 SERPL-SCNC: 40 MMOL/L (ref 21–32)
CREAT SERPL-MCNC: 0.52 MG/DL (ref 0.6–1.3)
EOSINOPHIL # BLD AUTO: 0.3 THOUSAND/ÂΜL (ref 0–0.61)
EOSINOPHIL NFR BLD AUTO: 2 % (ref 0–6)
ERYTHROCYTE [DISTWIDTH] IN BLOOD BY AUTOMATED COUNT: 19.1 % (ref 11.6–15.1)
GFR SERPL CREATININE-BSD FRML MDRD: 101 ML/MIN/1.73SQ M
GLUCOSE SERPL-MCNC: 181 MG/DL (ref 65–140)
GLUCOSE SERPL-MCNC: 202 MG/DL (ref 65–140)
GLUCOSE SERPL-MCNC: 225 MG/DL (ref 65–140)
GLUCOSE SERPL-MCNC: 269 MG/DL (ref 65–140)
GLUCOSE SERPL-MCNC: 341 MG/DL (ref 65–140)
HCT VFR BLD AUTO: 29.5 % (ref 34.8–46.1)
HGB BLD-MCNC: 8.4 G/DL (ref 11.5–15.4)
IMM GRANULOCYTES # BLD AUTO: 0.19 THOUSAND/UL (ref 0–0.2)
IMM GRANULOCYTES NFR BLD AUTO: 1 % (ref 0–2)
LYMPHOCYTES # BLD AUTO: 2.96 THOUSANDS/ÂΜL (ref 0.6–4.47)
LYMPHOCYTES NFR BLD AUTO: 21 % (ref 14–44)
MCH RBC QN AUTO: 19 PG (ref 26.8–34.3)
MCHC RBC AUTO-ENTMCNC: 28.5 G/DL (ref 31.4–37.4)
MCV RBC AUTO: 67 FL (ref 82–98)
MONOCYTES # BLD AUTO: 1.07 THOUSAND/ÂΜL (ref 0.17–1.22)
MONOCYTES NFR BLD AUTO: 8 % (ref 4–12)
NEUTROPHILS # BLD AUTO: 9.61 THOUSANDS/ÂΜL (ref 1.85–7.62)
NEUTS SEG NFR BLD AUTO: 68 % (ref 43–75)
NRBC BLD AUTO-RTO: 0 /100 WBCS
PLATELET # BLD AUTO: 311 THOUSANDS/UL (ref 149–390)
PMV BLD AUTO: 11.1 FL (ref 8.9–12.7)
POTASSIUM SERPL-SCNC: 3.4 MMOL/L (ref 3.5–5.3)
RBC # BLD AUTO: 4.41 MILLION/UL (ref 3.81–5.12)
SODIUM SERPL-SCNC: 139 MMOL/L (ref 135–147)
WBC # BLD AUTO: 14.16 THOUSAND/UL (ref 4.31–10.16)

## 2023-08-16 PROCEDURE — 94640 AIRWAY INHALATION TREATMENT: CPT

## 2023-08-16 PROCEDURE — 80048 BASIC METABOLIC PNL TOTAL CA: CPT | Performed by: INTERNAL MEDICINE

## 2023-08-16 PROCEDURE — 71045 X-RAY EXAM CHEST 1 VIEW: CPT

## 2023-08-16 PROCEDURE — 94762 N-INVAS EAR/PLS OXIMTRY CONT: CPT

## 2023-08-16 PROCEDURE — 99223 1ST HOSP IP/OBS HIGH 75: CPT | Performed by: INTERNAL MEDICINE

## 2023-08-16 PROCEDURE — 99232 SBSQ HOSP IP/OBS MODERATE 35: CPT | Performed by: INTERNAL MEDICINE

## 2023-08-16 PROCEDURE — 94760 N-INVAS EAR/PLS OXIMETRY 1: CPT

## 2023-08-16 PROCEDURE — 82948 REAGENT STRIP/BLOOD GLUCOSE: CPT

## 2023-08-16 PROCEDURE — 94660 CPAP INITIATION&MGMT: CPT

## 2023-08-16 PROCEDURE — 85025 COMPLETE CBC W/AUTO DIFF WBC: CPT | Performed by: INTERNAL MEDICINE

## 2023-08-16 PROCEDURE — 94664 DEMO&/EVAL PT USE INHALER: CPT

## 2023-08-16 RX ORDER — POTASSIUM CHLORIDE 20 MEQ/1
40 TABLET, EXTENDED RELEASE ORAL DAILY
Status: DISCONTINUED | OUTPATIENT
Start: 2023-08-17 | End: 2023-08-17

## 2023-08-16 RX ORDER — POTASSIUM CHLORIDE 20 MEQ/1
20 TABLET, EXTENDED RELEASE ORAL ONCE
Status: COMPLETED | OUTPATIENT
Start: 2023-08-16 | End: 2023-08-16

## 2023-08-16 RX ORDER — FUROSEMIDE 10 MG/ML
80 INJECTION INTRAMUSCULAR; INTRAVENOUS
Status: DISCONTINUED | OUTPATIENT
Start: 2023-08-16 | End: 2023-08-21

## 2023-08-16 RX ORDER — POTASSIUM CHLORIDE 20 MEQ/1
40 TABLET, EXTENDED RELEASE ORAL ONCE
Status: COMPLETED | OUTPATIENT
Start: 2023-08-16 | End: 2023-08-16

## 2023-08-16 RX ADMIN — FORMOTEROL FUMARATE DIHYDRATE 20 MCG: 20 SOLUTION RESPIRATORY (INHALATION) at 19:56

## 2023-08-16 RX ADMIN — FUROSEMIDE 80 MG: 10 INJECTION, SOLUTION INTRAMUSCULAR; INTRAVENOUS at 12:44

## 2023-08-16 RX ADMIN — APIXABAN 5 MG: 5 TABLET, FILM COATED ORAL at 17:22

## 2023-08-16 RX ADMIN — ESCITALOPRAM OXALATE 20 MG: 20 TABLET ORAL at 08:34

## 2023-08-16 RX ADMIN — APIXABAN 5 MG: 5 TABLET, FILM COATED ORAL at 08:34

## 2023-08-16 RX ADMIN — INSULIN LISPRO 8 UNITS: 100 INJECTION, SOLUTION INTRAVENOUS; SUBCUTANEOUS at 12:38

## 2023-08-16 RX ADMIN — TRAZODONE HYDROCHLORIDE 150 MG: 150 TABLET ORAL at 20:50

## 2023-08-16 RX ADMIN — INSULIN LISPRO 4 UNITS: 100 INJECTION, SOLUTION INTRAVENOUS; SUBCUTANEOUS at 08:36

## 2023-08-16 RX ADMIN — LORAZEPAM 1 MG: 1 TABLET ORAL at 20:50

## 2023-08-16 RX ADMIN — INSULIN LISPRO 16 UNITS: 100 INJECTION, SOLUTION INTRAVENOUS; SUBCUTANEOUS at 17:22

## 2023-08-16 RX ADMIN — FUROSEMIDE 80 MG: 10 INJECTION, SOLUTION INTRAMUSCULAR; INTRAVENOUS at 06:19

## 2023-08-16 RX ADMIN — FLUTICASONE PROPIONATE 1 SPRAY: 50 SPRAY, METERED NASAL at 20:50

## 2023-08-16 RX ADMIN — BUDESONIDE 0.5 MG: 0.5 INHALANT ORAL at 19:56

## 2023-08-16 RX ADMIN — INSULIN LISPRO 20 UNITS: 100 INJECTION, SOLUTION INTRAVENOUS; SUBCUTANEOUS at 17:22

## 2023-08-16 RX ADMIN — LORATADINE 10 MG: 10 TABLET ORAL at 08:34

## 2023-08-16 RX ADMIN — BUDESONIDE 0.5 MG: 0.5 INHALANT ORAL at 07:20

## 2023-08-16 RX ADMIN — IRON SUCROSE 200 MG: 20 INJECTION, SOLUTION INTRAVENOUS at 08:46

## 2023-08-16 RX ADMIN — POTASSIUM CHLORIDE 20 MEQ: 1500 TABLET, EXTENDED RELEASE ORAL at 12:36

## 2023-08-16 RX ADMIN — ACETAMINOPHEN 325MG 650 MG: 325 TABLET ORAL at 20:55

## 2023-08-16 RX ADMIN — ACETAMINOPHEN 325MG 650 MG: 325 TABLET ORAL at 08:33

## 2023-08-16 RX ADMIN — IPRATROPIUM BROMIDE 0.5 MG: 0.5 SOLUTION RESPIRATORY (INHALATION) at 13:36

## 2023-08-16 RX ADMIN — IPRATROPIUM BROMIDE 0.5 MG: 0.5 SOLUTION RESPIRATORY (INHALATION) at 07:20

## 2023-08-16 RX ADMIN — FUROSEMIDE 80 MG: 10 INJECTION, SOLUTION INTRAMUSCULAR; INTRAVENOUS at 17:23

## 2023-08-16 RX ADMIN — DIPHENHYDRAMINE HYDROCHLORIDE 25 MG: 50 INJECTION, SOLUTION INTRAMUSCULAR; INTRAVENOUS at 08:32

## 2023-08-16 RX ADMIN — INSULIN LISPRO 20 UNITS: 100 INJECTION, SOLUTION INTRAVENOUS; SUBCUTANEOUS at 12:38

## 2023-08-16 RX ADMIN — LEVALBUTEROL HYDROCHLORIDE 1.25 MG: 1.25 SOLUTION RESPIRATORY (INHALATION) at 19:56

## 2023-08-16 RX ADMIN — METOPROLOL SUCCINATE 100 MG: 50 TABLET, EXTENDED RELEASE ORAL at 08:34

## 2023-08-16 RX ADMIN — POTASSIUM CHLORIDE 40 MEQ: 1500 TABLET, EXTENDED RELEASE ORAL at 08:34

## 2023-08-16 RX ADMIN — MONTELUKAST 10 MG: 10 TABLET, FILM COATED ORAL at 20:50

## 2023-08-16 RX ADMIN — PANTOPRAZOLE SODIUM 40 MG: 40 TABLET, DELAYED RELEASE ORAL at 06:19

## 2023-08-16 RX ADMIN — PREDNISONE 40 MG: 20 TABLET ORAL at 08:34

## 2023-08-16 RX ADMIN — FORMOTEROL FUMARATE DIHYDRATE 20 MCG: 20 SOLUTION RESPIRATORY (INHALATION) at 08:02

## 2023-08-16 RX ADMIN — NYSTATIN 1 APPLICATION: 100000 POWDER TOPICAL at 17:24

## 2023-08-16 RX ADMIN — NYSTATIN: 100000 POWDER TOPICAL at 08:36

## 2023-08-16 RX ADMIN — LEVALBUTEROL HYDROCHLORIDE 1.25 MG: 1.25 SOLUTION RESPIRATORY (INHALATION) at 13:36

## 2023-08-16 RX ADMIN — INSULIN LISPRO 20 UNITS: 100 INJECTION, SOLUTION INTRAVENOUS; SUBCUTANEOUS at 08:37

## 2023-08-16 RX ADMIN — LEVALBUTEROL HYDROCHLORIDE 1.25 MG: 1.25 SOLUTION RESPIRATORY (INHALATION) at 07:20

## 2023-08-16 RX ADMIN — FLUTICASONE PROPIONATE 1 SPRAY: 50 SPRAY, METERED NASAL at 08:36

## 2023-08-16 RX ADMIN — IPRATROPIUM BROMIDE 0.5 MG: 0.5 SOLUTION RESPIRATORY (INHALATION) at 19:56

## 2023-08-16 RX ADMIN — INSULIN GLARGINE 50 UNITS: 100 INJECTION, SOLUTION SUBCUTANEOUS at 20:50

## 2023-08-16 RX ADMIN — ATORVASTATIN CALCIUM 40 MG: 40 TABLET, FILM COATED ORAL at 08:34

## 2023-08-16 NOTE — ASSESSMENT & PLAN NOTE
• Chronically 4L oxygen NC  • In ED placed on BIPAP then weaned to 12L [address] after receiving MERCER neb, 125mg IV solumedrol and 2g IV magnesium  • ABG on 6L oxygen: pH 7.3, pCO2 64.8, pO2 65.3, HCO3 33.7  • Received IV azithromycin and ceftriaxone in ED     Plan  • Consult Pulmonology-appreciate recommendations, will add IV iron x 3 days  • Respiratory protocol  • Wean oxygen to baseline as able  • Patient is currently 14 pounds over recent outpatient weight, 1+ pitting edema bilateral lower extremities.     • Wean oxygen as tolerated

## 2023-08-16 NOTE — PROGRESS NOTES
Progress Note - Pulmonary   Mattie Riley Comp 57 y.o. female MRN: [id number]  Unit/Bed#: -01 Encounter: [id number]    Assessment & Plan:  Acute on chronic hypoxic respiratory failure  Severe COPD with acute exacerbation  Diastolic heart failure with vascular congestion  WILMA on BiPAP  Severe pulmonary hypertension  Iron deficiency anemia  A-fib on Eliquis  Morbid obesity    · Patient currently on 5 L. Typically uses 4 L at baseline. Maintain pulse ox greater than 88%. Recommend ambulatory pulse ox prior to discharge to determine how much O2 she needs with exertion  · Transitioned to oral prednisone  · Continue Pulmicort and Perforomist nebs twice daily, Xopenex and Atrovent scheduled  · Acute issue at this point mainly seems to be CHF. Continue IV Lasix per Cardiology  · IV iron x3 days  · Outpatient pulmonary follow-up including [facility]    Subjective:   Raya Rothman says her breathing continues to improve. No new complaints    Objective:     Vitals: Blood pressure 113/76, pulse 85, temperature 97.8 °F (36.6 °C), resp. rate 20, height 5' 3" (1.6 m), weight 134 kg (294 lb 6.4 oz), SpO2 100 %, not currently breastfeeding. ,Body mass index is 52.15 kg/m². Intake/Output Summary (Last 24 hours) at 8/16/2023 0931  Last data filed at 8/16/2023 0849  Gross per 24 hour   Intake 1200 ml   Output 8700 ml   Net -7500 ml       Invasive Devices     Peripheral Intravenous Line  Duration           Peripheral IV 08/14/23 Dorsal (posterior); Right Hand 2 days                Physical Exam:  General appearance: Alert and oriented, in no acute distress  Head: Normocephalic, without obvious abnormality, atraumatic  Eyes: EOMI. No discharge bilaterally. No scleral icterus. Neck: Supple, symmetrical, trachea midline  Lungs: Decreased breath sounds  Heart: Regular rate  Abdomen:  No appreciable distension or tenderness  Extremities: No tenderness  Skin: Warm and dry  Neurologic: No acute focal deficits are noted    Labs:  I have personally reviewed pertinent lab results. Imaging and other studies: I have personally reviewed pertinent reports.

## 2023-08-16 NOTE — ASSESSMENT & PLAN NOTE
Wt Readings from Last 3 Encounters:   08/16/23 134 kg (294 lb 6.4 oz)   07/26/23 132 kg (291 lb 9.6 oz)   06/08/23 129 kg (284 lb)     • Patient is currently 14 pounds over her most recent outpatient weight  • Chest x-ray shows pulmonary vascular congestion  • Pitting edema present; lung field auscultation difficult given body habitus  • BNP 73  • Received 60mg IV lasix in ED  • On admission, was placed back on torsemide 60 mg twice daily p.o.  • ins and outs, daily weights     Plan  • Low-sodium diabetic diet  • Switch to Lasix 80 mg IV every 8 hours on 8/15  • Strict I/O  • Daily weights  · Trend BMP

## 2023-08-16 NOTE — ASSESSMENT & PLAN NOTE
• Home regimen: Albuterol, Bevespi, Budesonide, Benralalizumab, Montelukast, Xopenex  • Presented to ED from home via EMS for SOB since Tuesday. Patient states she has been noncompliant with nebulizer treatments and BIPAP HS at home since being discharged on 7/26/23. She says she has been compliant with inhalers, oral medications and CPAP HS. • In ED placed on BIPAP then weaned to 12L [address] after receiving MERCER neb, 125mg IV solumedrol and 2g IV magnesium  • ABG on 6L oxygen: pH 7.3, pCO2 64.8, pO2 65.3, HCO3 33.7  • Received IV azithromycin and ceftriaxone in ED  • Afebrile, procal 0.05     Plan  • IV Solumedrol 40mg BID --transition to oral prednisone daily on 8/14 if continues to improve  • Xopenex/Atrovent TID  • Pulmicort BID  • Performist BID   • Procalcitonin x2 is negative  • Will monitor off futher ABX.    • Respiratory protocol  • Pulmonology consult appreciated  • Patient is switched to oral prednisone by pulmonary to complete the course

## 2023-08-16 NOTE — PLAN OF CARE
Problem: PAIN - ADULT  Goal: Verbalizes/displays adequate comfort level or baseline comfort level  Description: Interventions:  - Encourage patient to monitor pain and request assistance  - Assess pain using appropriate pain scale  - Administer analgesics based on type and severity of pain and evaluate response  - Implement non-pharmacological measures as appropriate and evaluate response  - Consider cultural and social influences on pain and pain management  - Notify physician/advanced practitioner if interventions unsuccessful or patient reports new pain  Outcome: Progressing     Problem: INFECTION - ADULT  Goal: Absence or prevention of progression during hospitalization  Description: INTERVENTIONS:  - Assess and monitor for signs and symptoms of infection  - Monitor lab/diagnostic results  - Monitor all insertion sites, i.e. indwelling lines, tubes, and drains  - Monitor endotracheal if appropriate and nasal secretions for changes in amount and color  - Dickson appropriate cooling/warming therapies per order  - Administer medications as ordered  - Instruct and encourage patient and family to use good hand hygiene technique  - Identify and instruct in appropriate isolation precautions for identified infection/condition  Outcome: Progressing     Problem: SAFETY ADULT  Goal: Patient will remain free of falls  Description: INTERVENTIONS:  - Educate patient/family on patient safety including physical limitations  - Instruct patient to call for assistance with activity   - Consult OT/PT to assist with strengthening/mobility   - Keep Call bell within reach  - Keep bed low and locked with side rails adjusted as appropriate  - Keep care items and personal belongings within reach  - Initiate and maintain comfort rounds  - Make Fall Risk Sign visible to staff  - Offer Toileting every x Hours, in advance of need  - Initiate/Maintain xalarm  - Obtain necessary fall risk management equipment: x  - Apply yellow socks and bracelet for high fall risk patients  - Consider moving patient to room near nurses station  Outcome: Progressing  Goal: Maintain or return to baseline ADL function  Description: INTERVENTIONS:  -  Assess patient's ability to carry out ADLs; assess patient's baseline for ADL function and identify physical deficits which impact ability to perform ADLs (bathing, care of mouth/teeth, toileting, grooming, dressing, etc.)  - Assess/evaluate cause of self-care deficits   - Assess range of motion  - Assess patient's mobility; develop plan if impaired  - Assess patient's need for assistive devices and provide as appropriate  - Encourage maximum independence but intervene and supervise when necessary  - Involve family in performance of ADLs  - Assess for home care needs following discharge   - Consider OT consult to assist with ADL evaluation and planning for discharge  - Provide patient education as appropriate  Outcome: Progressing  Goal: Maintains/Returns to pre admission functional level  Description: INTERVENTIONS:  - Perform BMAT or MOVE assessment daily.   - Set and communicate daily mobility goal to care team and patient/family/caregiver. - Collaborate with rehabilitation services on mobility goals if consulted  - Perform Range of Motion x times a day. - Reposition patient every x hours.   - Dangle patient x times a day  - Stand patient x times a day  - Ambulate patient x times a day  - Out of bed to chair x times a day   - Out of bed for meals x times a day  - Out of bed for toileting  - Record patient progress and toleration of activity level   Outcome: Progressing     Problem: DISCHARGE PLANNING  Goal: Discharge to home or other facility with appropriate resources  Description: INTERVENTIONS:  - Identify barriers to discharge w/patient and caregiver  - Arrange for needed discharge resources and transportation as appropriate  - Identify discharge learning needs (meds, wound care, etc.)  - Arrange for interpretive services to assist at discharge as needed  - Refer to Case Management Department for coordinating discharge planning if the patient needs post-hospital services based on physician/advanced practitioner order or complex needs related to functional status, cognitive ability, or social support system  Outcome: Progressing     Problem: Knowledge Deficit  Goal: Patient/family/caregiver demonstrates understanding of disease process, treatment plan, medications, and discharge instructions  Description: Complete learning assessment and assess knowledge base.   Interventions:  - Provide teaching at level of understanding  - Provide teaching via preferred learning methods  Outcome: Progressing     Problem: RESPIRATORY - ADULT  Goal: Achieves optimal ventilation and oxygenation  Description: INTERVENTIONS:  - Assess for changes in respiratory status  - Assess for changes in mentation and behavior  - Position to facilitate oxygenation and minimize respiratory effort  - Oxygen administered by appropriate delivery if ordered  - Initiate smoking cessation education as indicated  - Encourage broncho-pulmonary hygiene including cough, deep breathe, Incentive Spirometry  - Assess the need for suctioning and aspirate as needed  - Assess and instruct to report SOB or any respiratory difficulty  - Respiratory Therapy support as indicated  Outcome: Progressing     Problem: METABOLIC, FLUID AND ELECTROLYTES - ADULT  Goal: Electrolytes maintained within normal limits  Description: INTERVENTIONS:  - Monitor labs and assess patient for signs and symptoms of electrolyte imbalances  - Administer electrolyte replacement as ordered  - Monitor response to electrolyte replacements, including repeat lab results as appropriate  - Instruct patient on fluid and nutrition as appropriate  Outcome: Progressing  Goal: Fluid balance maintained  Description: INTERVENTIONS:  - Monitor labs   - Monitor I/O and WT  - Instruct patient on fluid and nutrition as appropriate  - Assess for signs & symptoms of volume excess or deficit  Outcome: Progressing  Goal: Glucose maintained within target range  Description: INTERVENTIONS:  - Monitor Blood Glucose as ordered  - Assess for signs and symptoms of hyperglycemia and hypoglycemia  - Administer ordered medications to maintain glucose within target range  - Assess nutritional intake and initiate nutrition service referral as needed  Outcome: Progressing     Problem: Nutrition/Hydration-ADULT  Goal: Nutrient/Hydration intake appropriate for improving, restoring or maintaining nutritional needs  Description: Monitor and assess patient's nutrition/hydration status for malnutrition. Collaborate with interdisciplinary team and initiate plan and interventions as ordered. Monitor patient's weight and dietary intake as ordered or per policy. Utilize nutrition screening tool and intervene as necessary. Determine patient's food preferences and provide high-protein, high-caloric foods as appropriate.      INTERVENTIONS:  - Monitor oral intake, urinary output, labs, and treatment plans  - Assess nutrition and hydration status and recommend course of action  - Evaluate amount of meals eaten  - Assist patient with eating if necessary   - Allow adequate time for meals  - Recommend/ encourage appropriate diets, oral nutritional supplements, and vitamin/mineral supplements  - Order, calculate, and assess calorie counts as needed  - Recommend, monitor, and adjust tube feedings and TPN/PPN based on assessed needs  - Assess need for intravenous fluids  - Provide specific nutrition/hydration education as appropriate  - Include patient/family/caregiver in decisions related to nutrition  Outcome: Progressing     Problem: CARDIOVASCULAR - ADULT  Goal: Maintains optimal cardiac output and hemodynamic stability  Description: INTERVENTIONS:  - Monitor I/O, vital signs and rhythm  - Monitor for S/S and trends of decreased cardiac output  - Administer and titrate ordered vasoactive medications to optimize hemodynamic stability  - Assess quality of pulses, skin color and temperature  - Assess for signs of decreased coronary artery perfusion  - Instruct patient to report change in severity of symptoms  Outcome: Progressing     Problem: GENITOURINARY - ADULT  Goal: Maintains or returns to baseline urinary function  Description: INTERVENTIONS:  - Assess urinary function  - Encourage oral fluids to ensure adequate hydration if ordered  - Administer IV fluids as ordered to ensure adequate hydration  - Administer ordered medications as needed  - Offer frequent toileting  - Follow urinary retention protocol if ordered  Outcome: Progressing     Problem: SKIN/TISSUE INTEGRITY - ADULT  Goal: Skin Integrity remains intact(Skin Breakdown Prevention)  Description: Assess:  -Perform Jt assessment every x  -Clean and moisturize skin every x  -Inspect skin when repositioning, toileting, and assisting with ADLS  -Assess under medical devices such as x every x  -Assess extremities for adequate circulation and sensation     Bed Management:  -Have minimal linens on bed & keep smooth, unwrinkled  -Change linens as needed when moist or perspiring  -Avoid sitting or lying in one position for more than x hours while in bed  -[facility] at [facility]     Toileting:  -Offer bedside commode  -Assess for incontinence every x  -Use incontinent care products after each incontinent episode such as x    Activity:  -Mobilize patient x times a day  -Encourage activity and walks on unit  -Encourage or provide ROM exercises   -Turn and reposition patient every x Hours  -Use appropriate equipment to lift or move patient in bed  -Instruct/ Assist with weight shifting every x when out of bed in chair  -Consider limitation of chair time x hour intervals    Skin Care:  -Avoid use of baby powder, tape, friction and shearing, hot water or constrictive clothing  -Relieve pressure over bony prominences using x  -Do not massage red bony areas    Next Steps:  -Teach patient strategies to minimize risks such as x   -Consider consults to  interdisciplinary teams such as x  Outcome: Progressing     Problem: HEMATOLOGIC - ADULT  Goal: Maintains hematologic stability  Description: INTERVENTIONS  - Assess for signs and symptoms of bleeding or hemorrhage  - Monitor labs  - Administer supportive blood products/factors as ordered and appropriate  Outcome: Progressing     Problem: MUSCULOSKELETAL - ADULT  Goal: Maintain or return mobility to safest level of function  Description: INTERVENTIONS:  - Assess patient's ability to carry out ADLs; assess patient's baseline for ADL function and identify physical deficits which impact ability to perform ADLs (bathing, care of mouth/teeth, toileting, grooming, dressing, etc.)  - Assess/evaluate cause of self-care deficits   - Assess range of motion  - Assess patient's mobility  - Assess patient's need for assistive devices and provide as appropriate  - Encourage maximum independence but intervene and supervise when necessary  - Involve family in performance of ADLs  - Assess for home care needs following discharge   - Consider OT consult to assist with ADL evaluation and planning for discharge  - Provide patient education as appropriate  Outcome: Progressing     Problem: Prexisting or High Potential for Compromised Skin Integrity  Goal: Skin integrity is maintained or improved  Description: INTERVENTIONS:  - Identify patients at risk for skin breakdown  - Assess and monitor skin integrity  - Assess and monitor nutrition and hydration status  - Monitor labs   - Assess for incontinence   - Turn and reposition patient  - Assist with mobility/ambulation  - Relieve pressure over bony prominences  - Avoid friction and shearing  - Provide appropriate hygiene as needed including keeping skin clean and dry  - Evaluate need for skin moisturizer/barrier cream  - Collaborate with interdisciplinary team - Patient/family teaching  - Consider wound care consult   Outcome: Progressing     Problem: Potential for Falls  Goal: Patient will remain free of falls  Description: INTERVENTIONS:  - Educate patient/family on patient safety including physical limitations  - Instruct patient to call for assistance with activity   - Consult OT/PT to assist with strengthening/mobility   - Keep Call bell within reach  - Keep bed low and locked with side rails adjusted as appropriate  - Keep care items and personal belongings within reach  - Initiate and maintain comfort rounds  - Make Fall Risk Sign visible to staff  - Offer Toileting every x Hours, in advance of need  - Initiate/Maintain xalarm  - Obtain necessary fall risk management equipment: x  - Apply yellow socks and bracelet for high fall risk patients  - Consider moving patient to room near nurses station  Outcome: Progressing     Problem: MOBILITY - ADULT  Goal: Maintain or return to baseline ADL function  Description: INTERVENTIONS:  -  Assess patient's ability to carry out ADLs; assess patient's baseline for ADL function and identify physical deficits which impact ability to perform ADLs (bathing, care of mouth/teeth, toileting, grooming, dressing, etc.)  - Assess/evaluate cause of self-care deficits   - Assess range of motion  - Assess patient's mobility; develop plan if impaired  - Assess patient's need for assistive devices and provide as appropriate  - Encourage maximum independence but intervene and supervise when necessary  - Involve family in performance of ADLs  - Assess for home care needs following discharge   - Consider OT consult to assist with ADL evaluation and planning for discharge  - Provide patient education as appropriate  Outcome: Progressing  Goal: Maintains/Returns to pre admission functional level  Description: INTERVENTIONS:  - Perform BMAT or MOVE assessment daily.   - Set and communicate daily mobility goal to care team and patient/family/caregiver. - Collaborate with rehabilitation services on mobility goals if consulted  - Perform Range of Motion x times a day. - Reposition patient every x hours.   - Dangle patient x times a day  - Stand patient x times a day  - Ambulate patient x times a day  - Out of bed to chair x times a day   - Out of bed for meals xxx times a day  - Out of bed for toileting  - Record patient progress and toleration of activity level   Outcome: Progressing

## 2023-08-16 NOTE — PLAN OF CARE
Problem: PAIN - ADULT  Goal: Verbalizes/displays adequate comfort level or baseline comfort level  Description: Interventions:  - Encourage patient to monitor pain and request assistance  - Assess pain using appropriate pain scale  - Administer analgesics based on type and severity of pain and evaluate response  - Implement non-pharmacological measures as appropriate and evaluate response  - Consider cultural and social influences on pain and pain management  - Notify physician/advanced practitioner if interventions unsuccessful or patient reports new pain  Outcome: Progressing     Problem: INFECTION - ADULT  Goal: Absence or prevention of progression during hospitalization  Description: INTERVENTIONS:  - Assess and monitor for signs and symptoms of infection  - Monitor lab/diagnostic results  - Monitor all insertion sites, i.e. indwelling lines, tubes, and drains  - Monitor endotracheal if appropriate and nasal secretions for changes in amount and color  - [place] appropriate cooling/warming therapies per order  - Administer medications as ordered  - Instruct and encourage patient and family to use good hand hygiene technique  - Identify and instruct in appropriate isolation precautions for identified infection/condition  Outcome: Progressing     Problem: SAFETY ADULT  Goal: Patient will remain free of falls  Description: INTERVENTIONS:  - Educate patient/family on patient safety including physical limitations  - Instruct patient to call for assistance with activity   - Consult OT/PT to assist with strengthening/mobility   - Keep Call bell within reach  - Keep bed low and locked with side rails adjusted as appropriate  - Keep care items and personal belongings within reach  - Initiate and maintain comfort rounds  - Make Fall Risk Sign visible to staff  - Offer Toileting every x Hours, in advance of need  - Initiate/Maintain xalarm  - Obtain necessary fall risk management equipment: x  - Apply yellow socks and bracelet for high fall risk patients  - Consider moving patient to room near nurses station  Outcome: Progressing  Goal: Maintain or return to baseline ADL function  Description: INTERVENTIONS:  -  Assess patient's ability to carry out ADLs; assess patient's baseline for ADL function and identify physical deficits which impact ability to perform ADLs (bathing, care of mouth/teeth, toileting, grooming, dressing, etc.)  - Assess/evaluate cause of self-care deficits   - Assess range of motion  - Assess patient's mobility; develop plan if impaired  - Assess patient's need for assistive devices and provide as appropriate  - Encourage maximum independence but intervene and supervise when necessary  - Involve family in performance of ADLs  - Assess for home care needs following discharge   - Consider OT consult to assist with ADL evaluation and planning for discharge  - Provide patient education as appropriate  Outcome: Progressing  Goal: Maintains/Returns to pre admission functional level  Description: INTERVENTIONS:  - Perform BMAT or MOVE assessment daily.   - Set and communicate daily mobility goal to care team and patient/family/caregiver. - Collaborate with rehabilitation services on mobility goals if consulted  - Perform Range of Motion x times a day. - Reposition patient every x hours.   - Dangle patient x times a day  - Stand patient x times a day  - Ambulate patient x times a day  - Out of bed to chair x times a day   - Out of bed for meals x times a day  - Out of bed for toileting  - Record patient progress and toleration of activity level   Outcome: Progressing     Problem: DISCHARGE PLANNING  Goal: Discharge to home or other facility with appropriate resources  Description: INTERVENTIONS:  - Identify barriers to discharge w/patient and caregiver  - Arrange for needed discharge resources and transportation as appropriate  - Identify discharge learning needs (meds, wound care, etc.)  - Arrange for interpretive services to assist at discharge as needed  - Refer to Case Management Department for coordinating discharge planning if the patient needs post-hospital services based on physician/advanced practitioner order or complex needs related to functional status, cognitive ability, or social support system  Outcome: Progressing     Problem: Knowledge Deficit  Goal: Patient/family/caregiver demonstrates understanding of disease process, treatment plan, medications, and discharge instructions  Description: Complete learning assessment and assess knowledge base.   Interventions:  - Provide teaching at level of understanding  - Provide teaching via preferred learning methods  Outcome: Progressing     Problem: RESPIRATORY - ADULT  Goal: Achieves optimal ventilation and oxygenation  Description: INTERVENTIONS:  - Assess for changes in respiratory status  - Assess for changes in mentation and behavior  - Position to facilitate oxygenation and minimize respiratory effort  - Oxygen administered by appropriate delivery if ordered  - Initiate smoking cessation education as indicated  - Encourage broncho-pulmonary hygiene including cough, deep breathe, Incentive Spirometry  - Assess the need for suctioning and aspirate as needed  - Assess and instruct to report SOB or any respiratory difficulty  - Respiratory Therapy support as indicated  Outcome: Progressing     Problem: METABOLIC, FLUID AND ELECTROLYTES - ADULT  Goal: Electrolytes maintained within normal limits  Description: INTERVENTIONS:  - Monitor labs and assess patient for signs and symptoms of electrolyte imbalances  - Administer electrolyte replacement as ordered  - Monitor response to electrolyte replacements, including repeat lab results as appropriate  - Instruct patient on fluid and nutrition as appropriate  Outcome: Progressing  Goal: Fluid balance maintained  Description: INTERVENTIONS:  - Monitor labs   - Monitor I/O and WT  - Instruct patient on fluid and nutrition as appropriate  - Assess for signs & symptoms of volume excess or deficit  Outcome: Progressing  Goal: Glucose maintained within target range  Description: INTERVENTIONS:  - Monitor Blood Glucose as ordered  - Assess for signs and symptoms of hyperglycemia and hypoglycemia  - Administer ordered medications to maintain glucose within target range  - Assess nutritional intake and initiate nutrition service referral as needed  Outcome: Progressing     Problem: Nutrition/Hydration-ADULT  Goal: Nutrient/Hydration intake appropriate for improving, restoring or maintaining nutritional needs  Description: Monitor and assess patient's nutrition/hydration status for malnutrition. Collaborate with interdisciplinary team and initiate plan and interventions as ordered. Monitor patient's weight and dietary intake as ordered or per policy. Utilize nutrition screening tool and intervene as necessary. Determine patient's food preferences and provide high-protein, high-caloric foods as appropriate.      INTERVENTIONS:  - Monitor oral intake, urinary output, labs, and treatment plans  - Assess nutrition and hydration status and recommend course of action  - Evaluate amount of meals eaten  - Assist patient with eating if necessary   - Allow adequate time for meals  - Recommend/ encourage appropriate diets, oral nutritional supplements, and vitamin/mineral supplements  - Order, calculate, and assess calorie counts as needed  - Recommend, monitor, and adjust tube feedings and TPN/PPN based on assessed needs  - Assess need for intravenous fluids  - Provide specific nutrition/hydration education as appropriate  - Include patient/family/caregiver in decisions related to nutrition  Outcome: Progressing     Problem: CARDIOVASCULAR - ADULT  Goal: Maintains optimal cardiac output and hemodynamic stability  Description: INTERVENTIONS:  - Monitor I/O, vital signs and rhythm  - Monitor for S/S and trends of decreased cardiac output  - Administer and titrate ordered vasoactive medications to optimize hemodynamic stability  - Assess quality of pulses, skin color and temperature  - Assess for signs of decreased coronary artery perfusion  - Instruct patient to report change in severity of symptoms  Outcome: Progressing     Problem: GENITOURINARY - ADULT  Goal: Maintains or returns to baseline urinary function  Description: INTERVENTIONS:  - Assess urinary function  - Encourage oral fluids to ensure adequate hydration if ordered  - Administer IV fluids as ordered to ensure adequate hydration  - Administer ordered medications as needed  - Offer frequent toileting  - Follow urinary retention protocol if ordered  Outcome: Progressing     Problem: SKIN/TISSUE INTEGRITY - ADULT  Goal: Skin Integrity remains intact(Skin Breakdown Prevention)  Description: Assess:  -Perform Jt assessment every x  -Clean and moisturize skin every x  -Inspect skin when repositioning, toileting, and assisting with ADLS  -Assess under medical devices such as x every x  -Assess extremities for adequate circulation and sensation     Bed Management:  -Have minimal linens on bed & keep smooth, unwrinkled  -Change linens as needed when moist or perspiring  -Avoid sitting or lying in one position for more than x hours while in bed  -Keep HOB at [facility]     Toileting:  -Offer bedside commode  -Assess for incontinence every x  -Use incontinent care products after each incontinent episode such as x    Activity:  -Mobilize patient x times a day  -Encourage activity and walks on unit  -Encourage or provide ROM exercises   -Turn and reposition patient every x Hours  -Use appropriate equipment to lift or move patient in bed  -Instruct/ Assist with weight shifting every x when out of bed in chair  -Consider limitation of chair time x hour intervals    Skin Care:  -Avoid use of baby powder, tape, friction and shearing, hot water or constrictive clothing  -Relieve pressure over bony prominences using x  -Do not massage red bony areas    Next Steps:  -Teach patient strategies to minimize risks such as x   -Consider consults to  interdisciplinary teams such as x  Outcome: Progressing     Problem: HEMATOLOGIC - ADULT  Goal: Maintains hematologic stability  Description: INTERVENTIONS  - Assess for signs and symptoms of bleeding or hemorrhage  - Monitor labs  - Administer supportive blood products/factors as ordered and appropriate  Outcome: Progressing     Problem: MUSCULOSKELETAL - ADULT  Goal: Maintain or return mobility to safest level of function  Description: INTERVENTIONS:  - Assess patient's ability to carry out ADLs; assess patient's baseline for ADL function and identify physical deficits which impact ability to perform ADLs (bathing, care of mouth/teeth, toileting, grooming, dressing, etc.)  - Assess/evaluate cause of self-care deficits   - Assess range of motion  - Assess patient's mobility  - Assess patient's need for assistive devices and provide as appropriate  - Encourage maximum independence but intervene and supervise when necessary  - Involve family in performance of ADLs  - Assess for home care needs following discharge   - Consider OT consult to assist with ADL evaluation and planning for discharge  - Provide patient education as appropriate  Outcome: Progressing     Problem: Prexisting or High Potential for Compromised Skin Integrity  Goal: Skin integrity is maintained or improved  Description: INTERVENTIONS:  - Identify patients at risk for skin breakdown  - Assess and monitor skin integrity  - Assess and monitor nutrition and hydration status  - Monitor labs   - Assess for incontinence   - Turn and reposition patient  - Assist with mobility/ambulation  - Relieve pressure over bony prominences  - Avoid friction and shearing  - Provide appropriate hygiene as needed including keeping skin clean and dry  - Evaluate need for skin moisturizer/barrier cream  - Collaborate with interdisciplinary team - Patient/family teaching  - Consider wound care consult   Outcome: Progressing     Problem: Potential for Falls  Goal: Patient will remain free of falls  Description: INTERVENTIONS:  - Educate patient/family on patient safety including physical limitations  - Instruct patient to call for assistance with activity   - Consult OT/PT to assist with strengthening/mobility   - Keep Call bell within reach  - Keep bed low and locked with side rails adjusted as appropriate  - Keep care items and personal belongings within reach  - Initiate and maintain comfort rounds  - Make Fall Risk Sign visible to staff  - Offer Toileting every x Hours, in advance of need  - Initiate/Maintain xalarm  - Obtain necessary fall risk management equipment: x  - Apply yellow socks and bracelet for high fall risk patients  - Consider moving patient to room near nurses station  Outcome: Progressing     Problem: MOBILITY - ADULT  Goal: Maintain or return to baseline ADL function  Description: INTERVENTIONS:  -  Assess patient's ability to carry out ADLs; assess patient's baseline for ADL function and identify physical deficits which impact ability to perform ADLs (bathing, care of mouth/teeth, toileting, grooming, dressing, etc.)  - Assess/evaluate cause of self-care deficits   - Assess range of motion  - Assess patient's mobility; develop plan if impaired  - Assess patient's need for assistive devices and provide as appropriate  - Encourage maximum independence but intervene and supervise when necessary  - Involve family in performance of ADLs  - Assess for home care needs following discharge   - Consider OT consult to assist with ADL evaluation and planning for discharge  - Provide patient education as appropriate  Outcome: Progressing  Goal: Maintains/Returns to pre admission functional level  Description: INTERVENTIONS:  - Perform BMAT or MOVE assessment daily.   - Set and communicate daily mobility goal to care team and patient/family/caregiver. - Collaborate with rehabilitation services on mobility goals if consulted  - Perform Range of Motion x times a day. - Reposition patient every x hours.   - Dangle patient x times a day  - Stand patient x times a day  - Ambulate patient x times a day  - Out of bed to chair x times a day   - Out of bed for meals xxxxx times a day  - Out of bed for toileting  - Record patient progress and toleration of activity level   Outcome: Progressing

## 2023-08-16 NOTE — PROGRESS NOTES
[address] [facility]  Progress Note  Name: Cat Bustamante  MRN: [id number]  Unit/Bed#: -01 I Date of Admission: 8/11/2023   Date of Service: 8/16/2023 I Hospital Day: 5    Assessment/Plan   Class 3 severe obesity in adult [facility])  Assessment & Plan  • Encourage weight loss and lifestyle changes    Lactic acidosis  Assessment & Plan  • LA 2.4->2.6  • Trend       Acute on chronic diastolic congestive heart failure (HCC)  Assessment & Plan  Wt Readings from Last 3 Encounters:   08/16/23 134 kg (294 lb 6.4 oz)   07/26/23 132 kg (291 lb 9.6 oz)   06/08/23 129 kg (284 lb)     • Patient is currently 14 pounds over her most recent outpatient weight  • Chest x-ray shows pulmonary vascular congestion  • Pitting edema present; lung field auscultation difficult given body habitus  • BNP 73  • Received 60mg IV lasix in ED  • On admission, was placed back on torsemide 60 mg twice daily p.o.  • ins and outs, daily weights     Plan  • Low-sodium diabetic diet  • Switch to Lasix 80 mg IV every 8 hours on 8/15  • Strict I/O  • Daily weights  · Trend BMP    Paroxysmal atrial fibrillation (HCC)  Assessment & Plan  • Home regimen: Eliquis, metoprolol  • Currently SR in hospital     Plan  • Continue Eliquis and metoprol    Acute on chronic respiratory failure with hypoxia (HCC)  Assessment & Plan  • Chronically 4L oxygen NC  • In ED placed on BIPAP then weaned to 12L [address] after receiving MERCER neb, 125mg IV solumedrol and 2g IV magnesium  • ABG on 6L oxygen: pH 7.3, pCO2 64.8, pO2 65.3, HCO3 33.7  • Received IV azithromycin and ceftriaxone in ED     Plan  • Consult Pulmonology-appreciate recommendations, will add IV iron x 3 days  • Respiratory protocol  • Wean oxygen to baseline as able  • Patient is currently 14 pounds over recent outpatient weight, 1+ pitting edema bilateral lower extremities.     • Wean oxygen as tolerated    Type 2 diabetes mellitus with stage 2 chronic kidney disease, with long-term current use of insulin [facility])  Assessment & Plan  Lab Results   Component Value Date    HGBA1C 9.9 (H) 05/11/2023       Recent Labs     08/15/23  1319 08/15/23  1606 08/15/23  2039 08/16/23  0719   POCGLU 314* 315* 243* 202*       Blood Sugar Average: Last 72 hrs:  (P) [id number]   • Home regimen: Metformin, Glargine 30 units daily at bedtime, glulisine 24 units with breakfast, 12 units with lunch and dinner     SSI; Subcutaneous Insulin Order Set  Blood Glucose checks TIDWM and QHS (Q6H for NPO patients)  Hold oral medications  Blood Glucose goal while inpatient is 140-180  Reduce basal insulin by 25-50% while inpatient  Consistent Carbohydrate Diet  -- Blood glucose levels highly elevated; likely secondary to high-dose steroids. Currently on insulin drip  -- Was tapered off insulin drip. Continue on Lantus insulin and Humalog with meals and sliding scale    Obstructive sleep apnea  Assessment & Plan  • Discharged 7/26 with Rx for nocturnal BIPAP  • Patient states she is noncompliant with BIPAP due to personal preference at home and continues to wear CPAP      Plan  • Tolerated BIPAP in hospital -->continue BIPAP HS    * Asthma with COPD with exacerbation ([address])  Assessment & Plan  • Home regimen: Albuterol, Bevespi, Budesonide, Benralalizumab, Montelukast, Xopenex  • Presented to ED from home via EMS for SOB since Tuesday. Patient states she has been noncompliant with nebulizer treatments and BIPAP HS at home since being discharged on 7/26/23. She says she has been compliant with inhalers, oral medications and CPAP HS.    • In ED placed on BIPAP then weaned to 12L [address] after receiving MERCER neb, 125mg IV solumedrol and 2g IV magnesium  • ABG on 6L oxygen: pH 7.3, pCO2 64.8, pO2 65.3, HCO3 33.7  • Received IV azithromycin and ceftriaxone in ED  • Afebrile, procal 0.05     Plan  • IV Solumedrol 40mg BID --transition to oral prednisone daily on 8/14 if continues to improve  • Xopenex/Atrovent TID  • Pulmicort BID  • Performist BID   • Procalcitonin x2 is negative  • Will monitor off futher ABX. • Respiratory protocol  • Pulmonology consult appreciated  • Patient is switched to oral prednisone by pulmonary to complete the course           Labs & Imaging: I have personally reviewed pertinent reports. VTE Prophylaxis: in place. Code Status:   Level 1 - Full Code    Patient Centered Rounds: I have performed bedside rounds with nursing staff today. Discussions with Specialists or Other Care Team Provider: Pulmonary    Education and Discussions with Family / Patient: Patient declined family update    Current Length of Stay: 5 day(s)    Current Patient Status: Inpatient   Certification Statement: The patient will continue to require additional inpatient hospital stay due to see my assessment and plan. Subjective:   Patient is seen and examined at bedside. Denies any new complaints. Afebrile. On 5L of supplemental oxygen  All other ROS are negative. Objective:    Vitals: Blood pressure 113/76, pulse 85, temperature 97.8 °F (36.6 °C), resp. rate 20, height 5' 3" (1.6 m), weight 134 kg (294 lb 6.4 oz), SpO2 100 %, not currently breastfeeding. ,Body mass index is 52.15 kg/m². SPO2 RA Rest    Flowsheet Row ED to Hosp-Admission (Current) from 8/11/2023 in [address] [facility]   SpO2 100 %   SpO2 Activity At Rest   O2 Device Nasal cannula   O2 Flow Rate --        I&O:     Intake/Output Summary (Last 24 hours) at 8/16/2023 0842  Last data filed at 8/16/2023 0723  Gross per 24 hour   Intake 900 ml   Output 8400 ml   Net -7500 ml       Physical Exam:    General- Alert, lying comfortably in bed. Not in any acute distress.   Neck- Supple, No JVD  CVS- regular, S1 and S2 normal  Chest- Bilateral Air entry, has basilar Rales  Abdomen- soft, nontender, not distended, no guarding or rigidity, BS+  Extremities-  has pedal edema, No calf tenderness                         Normal ROM in all extremities. CNS-   Alert, awake and orientedx3. No focal deficits present. Invasive Devices     Peripheral Intravenous Line  Duration           Peripheral IV 08/14/23 Dorsal (posterior); Right Hand 2 days                      Social History  reviewed  Family History   Problem Relation Age of Onset   • Alzheimer's disease Mother    • Atrial fibrillation Mother    • Dementia Mother    • Heart disease Mother         heart problem   • Seizures Mother    • Parkinsonism Father    • Arthritis Father    • Atrial fibrillation Father    • No Known Problems Daughter    • Cri-du-chat syndrome Daughter    • Colon cancer Maternal Grandmother 80   • Diabetes type II Maternal Grandmother    • No Known Problems Brother    • No Known Problems Son    • Substance Abuse Neg Hx         mother,father   • Mental illness Neg Hx         mother,father   • Colon polyps Neg Hx     reviewed    Meds:  Current Facility-Administered Medications   Medication Dose Route Frequency Provider Last Rate Last Admin   • acetaminophen (TYLENOL) tablet 650 mg  650 mg Oral Q6H PRN Misty Zuniga PA-C   650 mg at 08/16/23 1203   • apixaban (ELIQUIS) tablet 5 mg  5 mg Oral BID Misty Zuniga PA-C   5 mg at 08/16/23 7864   • atorvastatin (LIPITOR) tablet 40 mg  40 mg Oral Daily Misty Zuniga PA-C   40 mg at 08/16/23 0834   • budesonide (PULMICORT) inhalation solution 0.5 mg  0.5 mg Nebulization Q12H Misty Zuniga PA-C   0.5 mg at 08/16/23 0720   • diphenhydrAMINE (BENADRYL) injection 25 mg  25 mg Intravenous Q6H PRN Rome Moss MD   25 mg at 08/16/23 2040   • escitalopram (LEXAPRO) tablet 20 mg  20 mg Oral Daily Misty Zuniga PA-C   20 mg at 08/16/23 0834   • fluticasone (FLONASE) 50 mcg/act nasal spray 1 spray  1 spray Nasal BID Misty Zuniga PA-C   1 spray at 08/16/23 2184   • formoterol (PERFOROMIST) nebulizer solution 20 mcg  20 mcg Nebulization Q12H Misty Zuniga PA-C   20 mcg at 08/16/23 0802   • furosemide (LASIX) injection 80 mg  80 mg Intravenous TID (diuretic) Justyna Tovar MD   80 mg at 08/16/23 6865   • insulin glargine (LANTUS) subcutaneous injection 50 Units 0.5 mL  50 Units Subcutaneous HS Justyna Tovar MD   50 Units at 08/15/23 2151   • insulin lispro (HumaLOG) 100 units/mL subcutaneous injection 20 Units  20 Units Subcutaneous TID With Meals Justyna Tovar MD   20 Units at 08/16/23 0837   • insulin lispro (HumaLOG) 100 units/mL subcutaneous injection 4-20 Units  4-20 Units Subcutaneous TID [facility] Justyna Tovar MD   4 Units at 08/16/23 0796   • ipratropium (ATROVENT) 0.02 % inhalation solution 0.5 mg  0.5 mg Nebulization TID Yamileth Barakat PA-C   0.5 mg at 08/16/23 0720   • iron sucrose (VENOFER) 200 mg in sodium chloride 0.9% 100 ml IVPB 200 mg  200 mg Intravenous Daily Moni Spears MD   200 mg at 08/15/23 0804   • levalbuterol (XOPENEX) inhalation solution 1.25 mg  1.25 mg Nebulization TID Yamileth Barakat PA-C   1.25 mg at 08/16/23 0720   • loratadine (CLARITIN) tablet 10 mg  10 mg Oral Daily Yamileth Barakat PA-C   10 mg at 08/16/23 1269   • LORazepam (ATIVAN) tablet 1 mg  1 mg Oral HS Yamileth Barakat PA-C   1 mg at 08/15/23 2151   • metoprolol succinate (TOPROL-XL) 24 hr tablet 100 mg  100 mg Oral Daily Yamileth Barakat PA-C   100 mg at 08/16/23 2146   • montelukast (SINGULAIR) tablet 10 mg  10 mg Oral HS Yamileth Barakat PA-C   10 mg at 08/15/23 2151   • nystatin (MYCOSTATIN) powder   Topical BID Moni Spears MD   Given at 08/16/23 0836   • pantoprazole (PROTONIX) EC tablet 40 mg  40 mg Oral Early Morning Yamileth Barakat PA-C   40 mg at 08/16/23 2722   • potassium chloride (K-DUR,KLOR-CON) CR tablet 20 mEq  20 mEq Oral Once ROSALIO Hyatt       • [START ON 8/17/2023] potassium chloride (K-DUR,KLOR-CON) CR tablet 40 mEq  40 mEq Oral Daily Avis Harrell PA-C       • traZODone (DESYREL) tablet 150 mg  150 mg Oral HS Yamileth Barakat PA-C   150 mg at 08/15/23 9938 Medications Prior to Admission   Medication   • apixaban (Eliquis) 5 mg   • atorvastatin (LIPITOR) 40 mg tablet   • escitalopram (LEXAPRO) 20 mg tablet   • fluticasone (FLONASE) 50 mcg/act nasal spray   • insulin glulisine (Apidra SoloStar) 100 units/mL injection pen   • loratadine (CLARITIN) 10 mg tablet   • LORazepam (ATIVAN) 0.5 mg tablet   • metFORMIN (GLUCOPHAGE-XR) 500 mg 24 hr tablet   • metoprolol succinate (TOPROL-XL) 100 mg 24 hr tablet   • montelukast (SINGULAIR) 10 mg tablet   • omeprazole (PriLOSEC) 40 MG capsule   • potassium chloride (Klor-Con M20) 20 mEq tablet   • torsemide (DEMADEX) 20 mg tablet   • traZODone (DESYREL) 150 mg tablet   • albuterol (2.5 mg/3 mL) 0.083 % nebulizer solution   • albuterol (PROVENTIL HFA,VENTOLIN HFA) 90 mcg/act inhaler   • Benralizumab 30 MG/ML SOAJ   • Blood Glucose Monitoring Suppl ([employer] CONTOUR NEXT MONITOR) w/Device KIT   • Contour Next Test test strip   • CVS Lancets Thin 26G MISC   • EPINEPHrine (EPIPEN) 0.3 mg/0.3 mL SOAJ   • ferrous sulfate 220 (44 Fe) mg/5 mL solution   • glycopyrrolate-formoterol (BEVESPI AEROSPHERE) 9-4.8 MCG/ACT inhaler   • Insulin Pen Needle (BD Pen Needle Love 2nd Gen) 32G X 4 MM MISC   • levalbuterol (XOPENEX) 1.25 mg/0.5 mL nebulizer solution   • naloxone (NARCAN) 4 mg/0.1 mL nasal spray   • semaglutide, 0.25 or 0.5 mg/dose, (Ozempic, 0.25 or 0.5 MG/DOSE,) 2 mg/3 mL injection pen       Labs:  Results from last 7 days   Lab Units 08/16/23  0508 08/15/23  0446 08/14/23  0443   WBC Thousand/uL 14.16* 13.87* 13.88*   HEMOGLOBIN g/dL 8.4* 8.7* 8.4*   HEMATOCRIT % 29.5* 31.3* 29.4*   PLATELETS Thousands/uL 311 322 304   NEUTROS PCT % 68 69 87*   LYMPHS PCT % 21 20 6*   MONOS PCT % 8 9 6   EOS PCT % 2 1 0     Results from last 7 days   Lab Units 08/16/23  0508 08/15/23  0447 08/14/23  0443 08/13/23  0513 08/12/23  0545 08/11/23  1746 08/11/23  1704   POTASSIUM mmol/L 3.4* 3.3* 3.7 3.6   < >  --  4.9   CHLORIDE mmol/L 93* 93* 92* 91*   < > --  91*   CO2 mmol/L 40* 41* 38* 35*   < >  --  39*   CO2, I-STAT mmol/L  --   --   --   --   --  44*  --    BUN mg/dL 12 16 22 20   < >  --  11   CREATININE mg/dL 0.52* 0.54* 0.57* 0.62   < >  --  0.59*   CALCIUM mg/dL 7.4* 7.4* 7.8* 8.6   < >  --  8.0*   ALK PHOS U/L  --   --   --  99  --   --  119*   ALT U/L  --   --   --  14  --   --  18   AST U/L  --   --   --  8*  --   --  28   GLUCOSE, ISTAT mg/dl  --   --   --   --   --  252*  --     < > = values in this interval not displayed. Lab Results   Component Value Date    TROPONINI <0.02 06/03/2021    TROPONINI <0.02 06/03/2021    TROPONINI <0.02 06/03/2021    CKTOTAL 39 09/07/2017     Results from last 7 days   Lab Units 08/11/23  1704   INR  1.14     Lab Results   Component Value Date    BLOODCX No Growth After 4 Days. 08/11/2023    BLOODCX No Growth After 4 Days. 08/11/2023    BLOODCX No Growth After 5 Days. 06/03/2023    SPUTUMCULTUR 2+ Growth of 05/21/2020    SPUTUMCULTUR  05/21/2020     Commensal respiratory nicholas only; No significant growth of Staph aureus/MRSA or Pseudomonas aeruginosa. Imaging:  Results for orders placed during the hospital encounter of 08/11/23    XR chest portable    Narrative  CHEST    INDICATION:   Shortness of breath and cough. .    COMPARISON: Chest radiograph 8/11/2023.; 9/27/2022    EXAM PERFORMED/VIEWS:  XR CHEST PORTABLE  Images:  1    FINDINGS:    Cardiomediastinal silhouette appears unremarkable. Bilateral prominent interstitial markings are slightly worsened compared to prior study. Slightly worsening pulmonary edema. There is new blunting of the left costophrenic angle, concerning for trace effusion. Osseous structures appear within normal limits for patient age. Impression  Pulmonary vascular congestion is worsened since prior exam on 8/11/2023 with new blunting of the costophrenic angles concerning for bilateral effusions. The study was marked in [facility] for immediate notification.     I have personally reviewed this study including all images.  / MONICA Resident: Park Coon, the attending radiologist, have reviewed the images and agree with the final report above. Workstation performed: [id number]    Results for orders placed during the hospital encounter of 09/27/22    XR chest pa & lateral    Narrative  CHEST    INDICATION:   J44.9: Chronic obstructive pulmonary disease, unspecified  R06.02: Shortness of breath. COMPARISON:  6/5/2021    EXAM PERFORMED/VIEWS:  XR CHEST PA & LATERAL      FINDINGS:    Cardiomediastinal silhouette appears unremarkable. Mild arch calcifications again noted. Mild emphysematous changes are seen. Bilateral lower lobe hazy pulmonary infiltrates and/or subsegmental atelectasis noted. Upper to midlung fields appear adequately aerated and clear. No pneumothorax or pleural effusion. Osseous structures appear within normal limits for patient age. No free air in the upper abdomen. Impression  Mild emphysematous changes are seen. Bilateral lower lobe hazy pulmonary infiltrates and/or subsegmental atelectasis noted. Upper to midlung fields appear adequately aerated and clear.           Workstation performed: [id number]      Last 24 Hours Medication List:   Current Facility-Administered Medications   Medication Dose Route Frequency Provider Last Rate   • acetaminophen  650 mg Oral Q6H PRN Margaret Bend, PA-C     • apixaban  5 mg Oral BID Dyann Bend, PA-C     • atorvastatin  40 mg Oral Daily Dyann Bend, PA-C     • budesonide  0.5 mg Nebulization Q12H Dyann Bend, PA-C     • diphenhydrAMINE  25 mg Intravenous Q6H PRN Laura Valencia MD     • escitalopram  20 mg Oral Daily Dyann Bend, PA-C     • fluticasone  1 spray Nasal BID Dyann Bend, PA-C     • formoterol  20 mcg Nebulization Q12H Dyann Bend, PA-C     • furosemide  80 mg Intravenous TID (diuretic) Dinesh Beaver MD     • insulin glargine  50 Units Subcutaneous HS Dinesh Beaver MD     • insulin lispro  20 Units Subcutaneous TID With Meals Dinesh Beaver MD     • insulin lispro  4-20 Units Subcutaneous TID AC Dinesh Beaver MD     • ipratropium  0.5 mg Nebulization TID Margaret Carolina PA-C     • iron sucrose  200 mg Intravenous Daily Laura Valencia MD     • levalbuterol  1.25 mg Nebulization TID Margaret Carolina PA-C     • loratadine  10 mg Oral Daily Margaret Caorlina PA-C     • LORazepam  1 mg Oral HS Margaret Carolina PA-C     • metoprolol succinate  100 mg Oral Daily Margaret Carolina PA-C     • montelukast  10 mg Oral HS Margaret Carolina PA-C     • nystatin   Topical BID Laura Valencia MD     • pantoprazole  40 mg Oral Early Morning Margaret Carolina PA-C     • potassium chloride  20 mEq Oral Once ROSALIO Hyatt     • [START ON 8/17/2023] potassium chloride  40 mEq Oral Daily Kateryna Javy Harrell PA-C     • traZODone  150 mg Oral HS Margaret Carolina PA-C          Today, Patient Was Seen By: Dinesh Beaver MD    ** Please Note: Dictation voice to text software may have been used in the creation of this document.  **

## 2023-08-16 NOTE — CONSULTS
Consult - Cardiology   Isidra Thurman 57 y.o. female MRN: [id number]  Unit/Bed#: -01 Encounter: [id number]        Reason For Consult: Heart failure  Outpatient Cardiologist: Dr Dino Castro:  1. Acute on chronic respiratory failure  a. Baseline oxygen requirement 4 L  b. Etiology of decompensation is congestive heart failure and COPD exacerbation  c. CXR 8/14/2023: Pulmonary vascular congestion worsened since prior exam with new blunting of costophrenic angles concerning for bilateral pleural effusions  d. Pulmonology following and patient has received both IV and oral steroids as well as receiving IV iron supplements for anemia  2. Acute on chronic diastolic heart failure  a. 8/2023 Echo: LVEF 25%, grade 2 diastolic dysfunction, dilated RV with normal RV systolic function, dilated RA, severely elevated RV systolic pressure 61 mmHg  b. 9/2021 RHC: Severely elevated left and right-sided filling pressures, severe postcapillary pulmonary hypertension group 2, normal cardiac output  c. Baseline weight reportedly in the mid 280s  d. OP diuretic torsemide 60 twice daily with 20 twice daily potassium  3. Paroxysmal atrial fibrillation  a. Eliquis 5 twice daily for CVA risk reduction  b. AV blocking Rx: Toprol- daily  4. Type 2 diabetes  5. COPD  6. Obstructive sleep apnea    PLAN/ DISCUSSION:     • Current standing weight 294 with prior baseline reportedly in the mid 280s  • Total this admission, net -12 L with stable renal function and electrolytes  • CXR today with persistent pulmonary edema  • Agree with IV Lasix  • Give potassium 60 telemetry today and start 40 daily tomorrow  • Trend renal function, electrolytes, I/O  • Continue Eliquis and Toprol as ordered    History Of Present Illness:  Luke Ivy is a pleasant 77-year-old female who follows with our group for diastolic congestive heart failure as well as paroxysmal atrial fibrillation.   She has chronic respiratory failure and is maintained on 4 L of oxygen nasal cannula at home. Presently she has been hospitalized for several days having presented originally for shortness of breath. She was found to be in acute on chronic respiratory failure with asthma and COPD exacerbation overlap. She was treated with inhalers and IV steroids as well as given some intermittent IV diuretics for volume overload. After several days she continues to have ongoing increased oxygen requirements of about 5 L which is above her baseline. She is noted to have pitting edema and has been suggested for ongoing IV diuresis for which reason we have been asked see her in consultation. Past Medical History:        Past Medical History:   Diagnosis Date   • Acute blood loss anemia 2021   • Acute diverticulitis 2021   • Alcohol abuse 2020   • Anemia    • Atrial fibrillation [facility])    • Cervical radiculopathy    • CHF (congestive heart failure) ([facility])    • Chronic low back pain    • Chronic obstructive asthma ([address]) 2018   • Cigarette nicotine dependence without complication     Quit 12/10/2019.     • Community acquired pneumonia 2020   • Depression with anxiety 3/9/2014   • Diabetes mellitus with hyperglycemia ([address])    • Diverticulitis 2021   • Elevated liver enzymes    • GERD (gastroesophageal reflux disease)    • Hypersomnolence 10/28/2020   • Hypokalemia 2018   • Paresthesia of upper extremity    • Plantar fasciitis    • Restless legs syndrome 2014   • Sexual dysfunction    • Sleep apnea, obstructive    • Tenosynovitis of finger    • Tinea corporis    • Tobacco use 2018    Currently smoking 3-4 cigarettes daily   • Trigger middle finger of left hand 2017   • Trigger ring finger of left hand 2017   • Weakness of upper extremity       Past Surgical History:   Procedure Laterality Date   • ABDOMINAL SURGERY     • CARPAL TUNNEL RELEASE Bilateral    •  SECTION     • CHOLECYSTECTOMY      laparoscopic • ESOPHAGOGASTRODUODENOSCOPY N/A 12/3/2018    Procedure: ESOPHAGOGASTRODUODENOSCOPY (EGD) AND COLONOSCOPY;  Surgeon: Marilu Meyers MD;  Location: [facility];  Service: Gastroenterology   • GASTRIC BYPASS      for morbid obesity with gilda en y   • HERNIA REPAIR     • HYSTERECTOMY     • NY EXCISION GANGLION WRIST DORSAL/VOLAR PRIMARY Left 12/14/2017    Procedure: LONG FINGER GANGLION CYST EXCISION;  Surgeon: Isabel Garcia MD;  Location[facility];  Service: Orthopedics   • NY TENDON SHEATH INCISION Left 12/14/2017    Procedure: Rylan Olson, RING, TRIGGER FINGER RELEASE;  Surgeon: Isabel Garcia MD;  Location: [facility];  Service: Orthopedics   • SHOULDER SURGERY Right         Allergy:        Allergies   Allergen Reactions   • Iron Dextran Swelling   • Januvia [Sitagliptin] Shortness Of Breath   • Jardiance [Empagliflozin] Shortness Of Breath   • Clonazepam Other (See Comments)     Unknown reaction   • Codeine Itching and Other (See Comments)     itch;mary kay morphine,takes ultram @home   • Adhesive [Medical Tape] Itching     itching   • Effexor [Venlafaxine] Itching   • Lactose - Food Allergy GI Intolerance   • Oxycodone-Acetaminophen Anxiety   • Oxycodone-Acetaminophen Itching and Rash     Other reaction(s): Other (See Comments)  (PERCOCET) MILD RASH, not allergic to Acetaminophen        Medications:       Prior to Admission medications    Medication Sig Start Date End Date Taking?  Authorizing Provider   apixaban (Eliquis) 5 mg Take 1 tablet (5 mg total) by mouth 2 (two) times a day 8/7/23  Yes Tiff Patiño MD   atorvastatin (LIPITOR) 40 mg tablet Take 1 tablet (40 mg total) by mouth daily 3/29/23  Yes Tiff Patiño MD   escitalopram (LEXAPRO) 20 mg tablet TAKE 1 TABLET BY MOUTH EVERY DAY 7/27/23  Yes Laith Olivares MD   fluticasone (FLONASE) 50 mcg/act nasal spray 1 spray into each nostril 2 (two) times a day 9/23/20  Yes Vida Briscoe MD   insulin glulisine (Apidra SoloStar) 100 units/mL injection pen 24 UNITS BREAKFAST 15 UNITS AT LUNCH AND DINNER 7/26/23  Yes Ghada Lewis PA-C   loratadine (CLARITIN) 10 mg tablet Take by mouth   Yes Historical Provider, MD   LORazepam (ATIVAN) 0.5 mg tablet TAKE 2 TABLETS BY MOUTH AT BEDTIME AND 1 EVERY 6 HOURS AS NEEDED FOR ANXIETY 6/30/23  Yes Laith Olivares MD   metFORMIN (GLUCOPHAGE-XR) 500 mg 24 hr tablet TAKE 2 TABLETS BY MOUTH TWICE A DAY WITH FOOD 7/27/23  Yes Laith Olivares MD   metoprolol succinate (TOPROL-XL) 100 mg 24 hr tablet Take 1 tablet (100 mg total) by mouth daily 3/29/23  Yes Kain Cao MD   montelukast (SINGULAIR) 10 mg tablet TAKE 1 TABLET BY MOUTH DAILY AT BEDTIME 9/16/22  Yes Laith Olivares MD   omeprazole (PriLOSEC) 40 MG capsule TAKE 1 CAPSULE (40 MG TOTAL) BY MOUTH DAILY.  6/25/23  Yes Laith Olivares MD   potassium chloride (Klor-Con M20) 20 mEq tablet Take 1 tablet (20 mEq total) by mouth 2 (two) times a day 3/29/23  Yes Kain Cao MD   torsemide (DEMADEX) 20 mg tablet TAKE 3 TABLETS BY MOUTH 2 TIMES A DAY. 7/27/23  Yes Laith Olivares MD   traZODone (DESYREL) 150 mg tablet TAKE 1 TABLET BY MOUTH AT BEDTIME 7/27/23  Yes Laith Olivares MD   albuterol (2.5 mg/3 mL) 0.083 % nebulizer solution Take 3 mL (2.5 mg total) by nebulization every 6 (six) hours as needed for wheezing or shortness of breath 6/8/23   Keri Meyers MD   albuterol (PROVENTIL HFA,VENTOLIN HFA) 90 mcg/act inhaler Inhale 2 puffs every 4 (four) hours as needed for wheezing 6/8/23   Keri Meyers MD   Benralizumab 30 MG/ML SOAJ Inject 1 mL under the skin every 28 days 6/8/23   Keri Meyers MD   Blood Glucose Monitoring Suppl (Credible CONTOUR NEXT MONITOR) w/Device KIT Use daily 7/14/17   Historical Provider, MD   budesonide (PULMICORT) 0.5 mg/2 mL nebulizer solution TAKE 2 ML (0.5 MG TOTAL) BY NEBULIZATION TWICE A DAY RINSE MOUTH AFTER USE 8/14/23   Keri Meyers MD   Contour Next Test test strip USE TO TEST BLOOD SUGAR 3 TIMES A DAY 6/28/23   Laith Olivares MD   [employer] Lancets Thin 26G MISC USE 3 (THREE) TIMES A DAY 5/4/22   Laith Olivares MD   EPINEPHrine (EPIPEN) 0.3 mg/0.3 mL SOAJ Inject 0.3 mL (0.3 mg total) into a muscle once for 1 dose 12/21/22 3/29/23  Sundeep Chavis PA-C   ferrous sulfate 220 (44 Fe) mg/5 mL solution TAKE 5 ML (220 MG TOTAL) BY MOUTH 2 (TWO) TIMES A DAY BEFORE MEALS 2/1/22 7/24/23  Manny Shone, MD   glycopyrrolate-formoterol (BEVESPI AEROSPHERE) 9-4.8 MCG/ACT inhaler Inhale 2 puffs 2 (two) times a day 6/8/23   Graham Joel MD   Insulin Pen Needle (BD Pen Needle Love 2nd Gen) 32G X 4 MM MISC Use daily at bedtime Use 4 new needles daily . 2/3/22   Js Kaur MD   Lantus SoloStar 100 units/mL SOPN INJECT 0.3 ML (30 UNITS TOTAL) UNDER THE SKIN DAILY AT BEDTIME 8/14/23   Roula Garcia PA-C   levalbuterol (XOPENEX) 1.25 mg/0.5 mL nebulizer solution Take 0.5 mL (1.25 mg total) by nebulization 2 (two) times a day 11/16/20   Julieth Matthews DO   naloxone (NARCAN) 4 mg/0.1 mL nasal spray Administer 1 spray into a nostril. If breathing does not return to normal or if breathing difficulty resumes after 2-3 minutes, give another dose in the other nostril using a new spray.  7/29/20   Laith Olivares MD   semaglutide, 0.25 or 0.5 mg/dose, (Ozempic, 0.25 or 0.5 MG/DOSE,) 2 mg/3 mL injection pen Inject 0.75 mL (0.5 mg total) under the skin every 7 days 5/17/23   Roula Garcia PA-C       Family History:     Family History   Problem Relation Age of Onset   • Alzheimer's disease Mother    • Atrial fibrillation Mother    • Dementia Mother    • Heart disease Mother         heart problem   • Seizures Mother    • Parkinsonism Father    • Arthritis Father    • Atrial fibrillation Father    • No Known Problems Daughter    • Cri-du-chat syndrome Daughter    • Colon cancer Maternal Grandmother 80   • Diabetes type II Maternal Grandmother    • No Known Problems Brother    • No Known Problems Son • Substance Abuse Neg Hx         mother,father   • Mental illness Neg Hx         mother,father   • Colon polyps Neg Hx         Social History:       Social History     Socioeconomic History   • Marital status: Significant Other     Spouse name: None   • Number of children: None   • Years of education: None   • Highest education level: None   Occupational History   • None   Tobacco Use   • Smoking status: Former     Packs/day: 0.25     Years: 29.00     Total pack years: 7.25     Types: Cigarettes     Start date: 200     Quit date: 12/10/2019     Years since quitting: 3.6   • Smokeless tobacco: Never   Vaping Use   • Vaping Use: Never used   Substance and Sexual Activity   • Alcohol use: Not Currently     Alcohol/week: 20.0 standard drinks of alcohol     Types: 20 Cans of beer per week     Comment: about 6 coors light every night, stopped in 2019   • Drug use: No   • Sexual activity: Not Currently   Other Topics Concern   • None   Social History Narrative   • None     Social Determinants of Health     Financial Resource Strain: Not on file   Food Insecurity: No Food Insecurity (8/14/2023)    Hunger Vital Sign    • Worried About Running Out of Food in the Last Year: Never true    • Ran Out of Food in the Last Year: Never true   Transportation Needs: No Transportation Needs (8/14/2023)    PRAPARE - Transportation    • Lack of Transportation (Medical): No    • Lack of Transportation (Non-Medical):  No   Physical Activity: Not on file   Stress: Not on file   Social Connections: Not on file   Intimate Partner Violence: Not on file   Housing Stability: Low Risk  (8/14/2023)    Housing Stability Vital Sign    • Unable to Pay for Housing in the Last Year: No    • Number of Places Lived in the Last Year: 1    • Unstable Housing in the Last Year: No       ROS:  14 point ROS negative except as outlined above  Remainder review of systems is negative    Exam:  General:  alert, oriented and in no distress, cooperative  Head: Normocephalic, atraumatic. Eyes:  EOMI. Pupils - equal, round, reactive to accomodation. No icterus. Normal Conjunctiva. Oropharynx: moist and normal-appearing mucosa  Neck: supple, symmetrical, trachea midline and no JVD  Heart:  RRR, No: murmer, rub or gallop, S1 & S2 normal   Respiratory effort / Chest Inspection: unlabored  Lungs:  normal air entry, lungs clear to auscultation and no rales, rhonchi or wheezing   Abdomen: flat, normal findings: bowel sounds normal and soft, non-tender  Lower Limbs:  no pitting edema  Pulses[de-identified]  RLE - DP: present 2+                 LLE - DP: present 2+  Musculoskeletal: ROM grossly normal        DATA:      ECG:                           Echocardiogram:           Ischemic Testing:         Weights: Wt Readings from Last 3 Encounters:   08/16/23 134 kg (294 lb 6.4 oz)   07/26/23 132 kg (291 lb 9.6 oz)   06/08/23 129 kg (284 lb)   , Body mass index is 52.15 kg/m². Lab Studies:             Results from last 7 days   Lab Units 08/16/23  0508 08/15/23  0446 08/14/23  0443   WBC Thousand/uL 14.16* 13.87* 13.88*   HEMOGLOBIN g/dL 8.4* 8.7* 8.4*   HEMATOCRIT % 29.5* 31.3* 29.4*   PLATELETS Thousands/uL 311 322 304   ,   Results from last 7 days   Lab Units 08/16/23  0508 08/15/23  0447 08/14/23  0443 08/13/23  0513 08/12/23  0545 08/11/23  1746 08/11/23  1704   POTASSIUM mmol/L 3.4* 3.3* 3.7 3.6   < >  --  4.9   CHLORIDE mmol/L 93* 93* 92* 91*   < >  --  91*   CO2 mmol/L 40* 41* 38* 35*   < >  --  39*   CO2, I-STAT mmol/L  --   --   --   --   --  44*  --    BUN mg/dL 12 16 22 20   < >  --  11   CREATININE mg/dL 0.52* 0.54* 0.57* 0.62   < >  --  0.59*   CALCIUM mg/dL 7.4* 7.4* 7.8* 8.6   < >  --  8.0*   ALK PHOS U/L  --   --   --  99  --   --  119*   ALT U/L  --   --   --  14  --   --  18   AST U/L  --   --   --  8*  --   --  28   GLUCOSE, ISTAT mg/dl  --   --   --   --   --  252*  --     < > = values in this interval not displayed.

## 2023-08-16 NOTE — ASSESSMENT & PLAN NOTE
Lab Results   Component Value Date    HGBA1C 9.9 (H) 05/11/2023       Recent Labs     08/15/23  1319 08/15/23  1606 08/15/23  2039 08/16/23  0719   POCGLU 314* 315* 243* 202*       Blood Sugar Average: Last 72 hrs:  (P) [id number]   • Home regimen: Metformin, Glargine 30 units daily at bedtime, glulisine 24 units with breakfast, 12 units with lunch and dinner     SSI; Subcutaneous Insulin Order Set  Blood Glucose checks TIDWM and QHS (Q6H for NPO patients)  Hold oral medications  Blood Glucose goal while inpatient is 140-180  Reduce basal insulin by 25-50% while inpatient  Consistent Carbohydrate Diet  -- Blood glucose levels highly elevated; likely secondary to high-dose steroids. Currently on insulin drip  -- Was tapered off insulin drip.   Continue on Lantus insulin and Humalog with meals and sliding scale

## 2023-08-17 ENCOUNTER — TELEPHONE (OUTPATIENT)
Dept: PULMONOLOGY | Facility: CLINIC | Age: 63
End: 2023-08-17

## 2023-08-17 LAB
ANION GAP SERPL CALCULATED.3IONS-SCNC: 6 MMOL/L
BACTERIA BLD CULT: NORMAL
BACTERIA BLD CULT: NORMAL
BASE EX.OXY STD BLDV CALC-SCNC: 94.5 % (ref 60–80)
BASE EXCESS BLDV CALC-SCNC: 10.2 MMOL/L
BASOPHILS # BLD AUTO: 0.05 THOUSANDS/ÂΜL (ref 0–0.1)
BASOPHILS NFR BLD AUTO: 0 % (ref 0–1)
BUN SERPL-MCNC: 13 MG/DL (ref 5–25)
CALCIUM SERPL-MCNC: 7.8 MG/DL (ref 8.4–10.2)
CHLORIDE SERPL-SCNC: 91 MMOL/L (ref 96–108)
CO2 SERPL-SCNC: 40 MMOL/L (ref 21–32)
CREAT SERPL-MCNC: 0.53 MG/DL (ref 0.6–1.3)
EOSINOPHIL # BLD AUTO: 0.4 THOUSAND/ÂΜL (ref 0–0.61)
EOSINOPHIL NFR BLD AUTO: 2 % (ref 0–6)
ERYTHROCYTE [DISTWIDTH] IN BLOOD BY AUTOMATED COUNT: 19.8 % (ref 11.6–15.1)
GFR SERPL CREATININE-BSD FRML MDRD: 100 ML/MIN/1.73SQ M
GLUCOSE SERPL-MCNC: 139 MG/DL (ref 65–140)
GLUCOSE SERPL-MCNC: 165 MG/DL (ref 65–140)
GLUCOSE SERPL-MCNC: 181 MG/DL (ref 65–140)
GLUCOSE SERPL-MCNC: 204 MG/DL (ref 65–140)
GLUCOSE SERPL-MCNC: 214 MG/DL (ref 65–140)
HCO3 BLDV-SCNC: 34.6 MMOL/L (ref 24–30)
HCT VFR BLD AUTO: 31.9 % (ref 34.8–46.1)
HGB BLD-MCNC: 9.1 G/DL (ref 11.5–15.4)
IMM GRANULOCYTES # BLD AUTO: 0.37 THOUSAND/UL (ref 0–0.2)
IMM GRANULOCYTES NFR BLD AUTO: 2 % (ref 0–2)
LYMPHOCYTES # BLD AUTO: 3.13 THOUSANDS/ÂΜL (ref 0.6–4.47)
LYMPHOCYTES NFR BLD AUTO: 17 % (ref 14–44)
MCH RBC QN AUTO: 19.5 PG (ref 26.8–34.3)
MCHC RBC AUTO-ENTMCNC: 28.5 G/DL (ref 31.4–37.4)
MCV RBC AUTO: 69 FL (ref 82–98)
MONOCYTES # BLD AUTO: 1.11 THOUSAND/ÂΜL (ref 0.17–1.22)
MONOCYTES NFR BLD AUTO: 6 % (ref 4–12)
NEUTROPHILS # BLD AUTO: 13.91 THOUSANDS/ÂΜL (ref 1.85–7.62)
NEUTS SEG NFR BLD AUTO: 73 % (ref 43–75)
NRBC BLD AUTO-RTO: 0 /100 WBCS
O2 CT BLDV-SCNC: 13.8 ML/DL
PCO2 BLDV: 46.5 MM HG (ref 42–50)
PH BLDV: 7.49 [PH] (ref 7.3–7.4)
PLATELET # BLD AUTO: 361 THOUSANDS/UL (ref 149–390)
PMV BLD AUTO: 10.9 FL (ref 8.9–12.7)
PO2 BLDV: 117.2 MM HG (ref 35–45)
POTASSIUM SERPL-SCNC: 3.4 MMOL/L (ref 3.5–5.3)
RBC # BLD AUTO: 4.66 MILLION/UL (ref 3.81–5.12)
SODIUM SERPL-SCNC: 137 MMOL/L (ref 135–147)
WBC # BLD AUTO: 18.97 THOUSAND/UL (ref 4.31–10.16)

## 2023-08-17 PROCEDURE — 82948 REAGENT STRIP/BLOOD GLUCOSE: CPT

## 2023-08-17 PROCEDURE — 94640 AIRWAY INHALATION TREATMENT: CPT

## 2023-08-17 PROCEDURE — 94760 N-INVAS EAR/PLS OXIMETRY 1: CPT

## 2023-08-17 PROCEDURE — 94660 CPAP INITIATION&MGMT: CPT

## 2023-08-17 PROCEDURE — 99232 SBSQ HOSP IP/OBS MODERATE 35: CPT | Performed by: INTERNAL MEDICINE

## 2023-08-17 PROCEDURE — 82805 BLOOD GASES W/O2 SATURATION: CPT | Performed by: INTERNAL MEDICINE

## 2023-08-17 PROCEDURE — 80048 BASIC METABOLIC PNL TOTAL CA: CPT | Performed by: INTERNAL MEDICINE

## 2023-08-17 PROCEDURE — 85025 COMPLETE CBC W/AUTO DIFF WBC: CPT | Performed by: INTERNAL MEDICINE

## 2023-08-17 RX ORDER — POTASSIUM CHLORIDE 20 MEQ/1
40 TABLET, EXTENDED RELEASE ORAL 2 TIMES DAILY
Status: DISCONTINUED | OUTPATIENT
Start: 2023-08-17 | End: 2023-08-22

## 2023-08-17 RX ADMIN — LEVALBUTEROL HYDROCHLORIDE 1.25 MG: 1.25 SOLUTION RESPIRATORY (INHALATION) at 13:21

## 2023-08-17 RX ADMIN — BUDESONIDE 0.5 MG: 0.5 INHALANT ORAL at 07:10

## 2023-08-17 RX ADMIN — ACETAMINOPHEN 325MG 650 MG: 325 TABLET ORAL at 21:55

## 2023-08-17 RX ADMIN — FUROSEMIDE 80 MG: 10 INJECTION, SOLUTION INTRAMUSCULAR; INTRAVENOUS at 12:26

## 2023-08-17 RX ADMIN — NYSTATIN 1 APPLICATION: 100000 POWDER TOPICAL at 08:05

## 2023-08-17 RX ADMIN — POTASSIUM CHLORIDE 40 MEQ: 1500 TABLET, EXTENDED RELEASE ORAL at 08:02

## 2023-08-17 RX ADMIN — BUDESONIDE 0.5 MG: 0.5 INHALANT ORAL at 19:44

## 2023-08-17 RX ADMIN — INSULIN GLARGINE 50 UNITS: 100 INJECTION, SOLUTION SUBCUTANEOUS at 21:51

## 2023-08-17 RX ADMIN — FORMOTEROL FUMARATE DIHYDRATE 20 MCG: 20 SOLUTION RESPIRATORY (INHALATION) at 19:44

## 2023-08-17 RX ADMIN — INSULIN LISPRO 20 UNITS: 100 INJECTION, SOLUTION INTRAVENOUS; SUBCUTANEOUS at 12:41

## 2023-08-17 RX ADMIN — LEVALBUTEROL HYDROCHLORIDE 1.25 MG: 1.25 SOLUTION RESPIRATORY (INHALATION) at 19:44

## 2023-08-17 RX ADMIN — FORMOTEROL FUMARATE DIHYDRATE 20 MCG: 20 SOLUTION RESPIRATORY (INHALATION) at 07:10

## 2023-08-17 RX ADMIN — IPRATROPIUM BROMIDE 0.5 MG: 0.5 SOLUTION RESPIRATORY (INHALATION) at 13:21

## 2023-08-17 RX ADMIN — APIXABAN 5 MG: 5 TABLET, FILM COATED ORAL at 08:03

## 2023-08-17 RX ADMIN — FLUTICASONE PROPIONATE 1 SPRAY: 50 SPRAY, METERED NASAL at 08:05

## 2023-08-17 RX ADMIN — ATORVASTATIN CALCIUM 40 MG: 40 TABLET, FILM COATED ORAL at 08:02

## 2023-08-17 RX ADMIN — FUROSEMIDE 80 MG: 10 INJECTION, SOLUTION INTRAMUSCULAR; INTRAVENOUS at 17:40

## 2023-08-17 RX ADMIN — LORATADINE 10 MG: 10 TABLET ORAL at 08:02

## 2023-08-17 RX ADMIN — IPRATROPIUM BROMIDE 0.5 MG: 0.5 SOLUTION RESPIRATORY (INHALATION) at 19:44

## 2023-08-17 RX ADMIN — FLUTICASONE PROPIONATE 1 SPRAY: 50 SPRAY, METERED NASAL at 21:50

## 2023-08-17 RX ADMIN — TRAZODONE HYDROCHLORIDE 150 MG: 150 TABLET ORAL at 21:51

## 2023-08-17 RX ADMIN — APIXABAN 5 MG: 5 TABLET, FILM COATED ORAL at 17:39

## 2023-08-17 RX ADMIN — ESCITALOPRAM OXALATE 20 MG: 20 TABLET ORAL at 08:02

## 2023-08-17 RX ADMIN — INSULIN LISPRO 20 UNITS: 100 INJECTION, SOLUTION INTRAVENOUS; SUBCUTANEOUS at 08:02

## 2023-08-17 RX ADMIN — NYSTATIN 1 APPLICATION: 100000 POWDER TOPICAL at 17:41

## 2023-08-17 RX ADMIN — FUROSEMIDE 80 MG: 10 INJECTION, SOLUTION INTRAMUSCULAR; INTRAVENOUS at 05:59

## 2023-08-17 RX ADMIN — LEVALBUTEROL HYDROCHLORIDE 1.25 MG: 1.25 SOLUTION RESPIRATORY (INHALATION) at 07:10

## 2023-08-17 RX ADMIN — LORAZEPAM 1 MG: 1 TABLET ORAL at 21:51

## 2023-08-17 RX ADMIN — MONTELUKAST 10 MG: 10 TABLET, FILM COATED ORAL at 21:51

## 2023-08-17 RX ADMIN — INSULIN LISPRO 20 UNITS: 100 INJECTION, SOLUTION INTRAVENOUS; SUBCUTANEOUS at 17:40

## 2023-08-17 RX ADMIN — INSULIN LISPRO 8 UNITS: 100 INJECTION, SOLUTION INTRAVENOUS; SUBCUTANEOUS at 17:40

## 2023-08-17 RX ADMIN — PANTOPRAZOLE SODIUM 40 MG: 40 TABLET, DELAYED RELEASE ORAL at 05:59

## 2023-08-17 RX ADMIN — METOPROLOL SUCCINATE 100 MG: 50 TABLET, EXTENDED RELEASE ORAL at 08:04

## 2023-08-17 RX ADMIN — POTASSIUM CHLORIDE 40 MEQ: 1500 TABLET, EXTENDED RELEASE ORAL at 17:39

## 2023-08-17 RX ADMIN — INSULIN LISPRO 4 UNITS: 100 INJECTION, SOLUTION INTRAVENOUS; SUBCUTANEOUS at 08:02

## 2023-08-17 RX ADMIN — INSULIN LISPRO 4 UNITS: 100 INJECTION, SOLUTION INTRAVENOUS; SUBCUTANEOUS at 12:25

## 2023-08-17 RX ADMIN — IPRATROPIUM BROMIDE 0.5 MG: 0.5 SOLUTION RESPIRATORY (INHALATION) at 07:10

## 2023-08-17 NOTE — TELEPHONE ENCOUNTER
Received approval from insurance for the Fasenra injections for the [employer] [facility].      Buy and bill [id number]    Auth number: [id number]  Loading dose    08/28/2023-12/18/2023    maintained dose:    12/19/2023-08/28/2024    If you can please place the therapy plan for the [facility] [address]

## 2023-08-17 NOTE — ASSESSMENT & PLAN NOTE
Wt Readings from Last 3 Encounters:   08/17/23 132 kg (290 lb 3.2 oz)   07/26/23 132 kg (291 lb 9.6 oz)   06/08/23 129 kg (284 lb)     • Patient is currently 14 pounds over her most recent outpatient weight  • Chest x-ray shows pulmonary vascular congestion  • Pitting edema present; lung field auscultation difficult given body habitus  • BNP 73  • Received 60mg IV lasix in ED  • On admission, was placed back on torsemide 60 mg twice daily p.o.  • ins and outs, daily weights     Plan  • Low-sodium diabetic diet  • Switch to Lasix 80 mg IV every 8 hours on 8/15  • Strict I/O  • Daily weights  · Trend BMP

## 2023-08-17 NOTE — PROGRESS NOTES
Progress Note - Cardiology   Samella Pain 57 y.o. female MRN: [id number]  Unit/Bed#: -01 Encounter: [id number]        Problem List:  Principal Problem:    Asthma with COPD with exacerbation ([address])  Active Problems:    Obstructive sleep apnea    Type 2 diabetes mellitus with stage 2 chronic kidney disease, with long-term current use of insulin (HCC)    Acute on chronic respiratory failure with hypoxia (HCC)    Paroxysmal atrial fibrillation (HCC)    Acute on chronic diastolic congestive heart failure (HCC)    Lactic acidosis    Class 3 severe obesity in adult [facility])      Assessment:  1. Acute on chronic respiratory failure  a. Baseline oxygen requirement 4 L  b. Etiology of decompensation is congestive heart failure and COPD exacerbation  c. CXR 8/14/2023: Pulmonary vascular congestion worsened since prior exam with new blunting of costophrenic angles concerning for bilateral pleural effusions  d. Pulmonology following and patient has received both IV and oral steroids as well as receiving IV iron supplements for anemia  2. Acute on chronic diastolic heart failure  a. 8/2023 Echo: LVEF 11%, grade 2 diastolic dysfunction, dilated RV with normal RV systolic function, dilated RA, severely elevated RV systolic pressure 61 mmHg  b. 9/2021 RHC: Severely elevated left and right-sided filling pressures, severe postcapillary pulmonary hypertension group 2, normal cardiac output  c. Baseline weight reportedly in the mid 280s  d. OP diuretic torsemide 60 twice daily with 20 twice daily potassium  3. Paroxysmal atrial fibrillation  a. Eliquis 5 twice daily for CVA risk reduction  b. AV blocking Rx: Toprol- daily  4. Type 2 diabetes  5. COPD  6.  Obstructive sleep apnea    Plan/ Discussion:  • Standing weight down 4 pounds overnight  • If accurate, reportedly -5.8 L yesterday, 16.5 total admission  • Creatinine stable at 0.5, potassium low at 3.4 and sodium 137  • Increase potassium to 40 twice daily  • Continue IV Lasix • Continue remainder of cardiac medications  • Continue to trend BMP, lytes, I/O    Subjective:  Feeling perhaps slightly better today compared to yesterday  On BiPAP, breathing is comfortable    Vitals:  Vitals:    08/16/23 0514 08/17/23 0558   Weight: 134 kg (294 lb 6.4 oz) 132 kg (290 lb 3.2 oz)   ,  Vitals:    08/17/23 0710 08/17/23 0804 08/17/23 0804 08/17/23 0900   BP:  114/71 114/71    BP Location:       Pulse:  103 102    Resp:       Temp:       TempSrc:       SpO2: 97%  (!) 89% 99%   Weight:       Height:           Exam:  General: Alert awake and oriented, no acute distress  Heart:  Regular rate and rhythm, no murmurs, Normal S1, no edema    Respiratory effort/ Lungs:  Breathing comfortably on BiPAP, clear bilaterally without wheezing, rales, crackles   Abdominal: Non-tender to palpation, + bowel sounds, soft, no masses or distension  Lower Limbs:  No edema            Telemetry:         Medications:    Current Facility-Administered Medications:   •  acetaminophen (TYLENOL) tablet 650 mg, 650 mg, Oral, Q6H PRN, Rosemarie Mccullough PA-C, 650 mg at 08/16/23 2055  •  apixaban (ELIQUIS) tablet 5 mg, 5 mg, Oral, BID, Rosemarie Mccullough PA-C, 5 mg at 08/17/23 7123  •  atorvastatin (LIPITOR) tablet 40 mg, 40 mg, Oral, Daily, Rosemarie Mccullough PA-C, 40 mg at 08/17/23 0802  •  budesonide (PULMICORT) inhalation solution 0.5 mg, 0.5 mg, Nebulization, Q12H, Rosemarie Mccullough PA-C, 0.5 mg at 08/17/23 0710  •  diphenhydrAMINE (BENADRYL) injection 25 mg, 25 mg, Intravenous, Q6H PRN, Governor Kristine MD, 25 mg at 08/16/23 6166  •  escitalopram (LEXAPRO) tablet 20 mg, 20 mg, Oral, Daily, Rosemarie Mccullough, PA-C, 20 mg at 08/17/23 0802  •  fluticasone (FLONASE) 50 mcg/act nasal spray 1 spray, 1 spray, Nasal, BID, Rosemarie Mccullough PA-C, 1 spray at 08/17/23 0805  •  formoterol (PERFOROMIST) nebulizer solution 20 mcg, 20 mcg, Nebulization, Q12H, Rosemarie Mccullough PA-C, 20 mcg at 08/17/23 0710  •  furosemide (LASIX) injection 80 mg, 80 mg, Intravenous, TID (diuretic), Mariella Harrell, ROSALIO, 80 mg at 08/17/23 0559  •  insulin glargine (LANTUS) subcutaneous injection 50 Units 0.5 mL, 50 Units, Subcutaneous, HS, Miguel Garsia MD, 50 Units at 08/16/23 2050  •  insulin lispro (HumaLOG) 100 units/mL subcutaneous injection 20 Units, 20 Units, Subcutaneous, TID With Meals, Miguel Garsia MD, 20 Units at 08/17/23 0802  •  insulin lispro (HumaLOG) 100 units/mL subcutaneous injection 4-20 Units, 4-20 Units, Subcutaneous, TID AC, 4 Units at 08/17/23 0802 **AND** [CANCELED] Fingerstick Glucose (POCT), , , TID AC, Miguel Garsia MD  •  ipratropium (ATROVENT) 0.02 % inhalation solution 0.5 mg, 0.5 mg, Nebulization, TID, Francine Garg PA-C, 0.5 mg at 08/17/23 0710  •  levalbuterol (Corean Puffer) inhalation solution 1.25 mg, 1.25 mg, Nebulization, TID, Francine Garg PA-C, 1.25 mg at 08/17/23 0710  •  loratadine (CLARITIN) tablet 10 mg, 10 mg, Oral, Daily, Francine Garg PA-C, 10 mg at 08/17/23 0351  •  LORazepam (ATIVAN) tablet 1 mg, 1 mg, Oral, HS, Francine Garg PA-C, 1 mg at 08/16/23 2050  •  metoprolol succinate (TOPROL-XL) 24 hr tablet 100 mg, 100 mg, Oral, Daily, Francine Garg PA-C, 100 mg at 08/17/23 3091  •  montelukast (SINGULAIR) tablet 10 mg, 10 mg, Oral, HS, Francine Garg PA-C, 10 mg at 08/16/23 2050  •  nystatin (MYCOSTATIN) powder, , Topical, BID, Mason Palencia MD, 1 Application at 53/30/01 0805  •  pantoprazole (PROTONIX) EC tablet 40 mg, 40 mg, Oral, Early Morning, Francine Garg PA-C, 40 mg at 08/17/23 0559  •  potassium chloride (K-DUR,KLOR-CON) CR tablet 40 mEq, 40 mEq, Oral, Daily, Mariella Harrell PA-C, 40 mEq at 08/17/23 0802  •  traZODone (DESYREL) tablet 150 mg, 150 mg, Oral, HS, Francine Garg PA-C, 150 mg at 08/16/23 2050      Labs/Data:        Results from last 7 days   Lab Units 08/17/23  0549 08/16/23  0508 08/15/23  0446   WBC Thousand/uL 18.97* 14.16* 13.87*   HEMOGLOBIN g/dL 9.1* 8.4* 8.7*   HEMATOCRIT % 31.9* 29.5* 31.3*   PLATELETS Thousands/uL 361 311 322     Results from last 7 days   Lab Units 08/17/23  0549 08/16/23  0508 08/15/23  0447   POTASSIUM mmol/L 3.4* 3.4* 3.3*   CHLORIDE mmol/L 91* 93* 93*   CO2 mmol/L 40* 40* 41*   BUN mg/dL 13 12 16   CREATININE mg/dL 0.53* 0.52* 0.54*

## 2023-08-17 NOTE — PLAN OF CARE
Problem: PAIN - ADULT  Goal: Verbalizes/displays adequate comfort level or baseline comfort level  Description: Interventions:  - Encourage patient to monitor pain and request assistance  - Assess pain using appropriate pain scale  - Administer analgesics based on type and severity of pain and evaluate response  - Implement non-pharmacological measures as appropriate and evaluate response  - Consider cultural and social influences on pain and pain management  - Notify physician/advanced practitioner if interventions unsuccessful or patient reports new pain  Outcome: Progressing     Problem: INFECTION - ADULT  Goal: Absence or prevention of progression during hospitalization  Description: INTERVENTIONS:  - Assess and monitor for signs and symptoms of infection  - Monitor lab/diagnostic results  - Monitor all insertion sites, i.e. indwelling lines, tubes, and drains  - Monitor endotracheal if appropriate and nasal secretions for changes in amount and color  - [place] appropriate cooling/warming therapies per order  - Administer medications as ordered  - Instruct and encourage patient and family to use good hand hygiene technique  - Identify and instruct in appropriate isolation precautions for identified infection/condition  Outcome: Progressing     Problem: SAFETY ADULT  Goal: Patient will remain free of falls  Description: INTERVENTIONS:  - Educate patient/family on patient safety including physical limitations  - Instruct patient to call for assistance with activity   - Consult OT/PT to assist with strengthening/mobility   - Keep Call bell within reach  - Keep bed low and locked with side rails adjusted as appropriate  - Keep care items and personal belongings within reach  - Initiate and maintain comfort rounds  - Make Fall Risk Sign visible to staff  - Offer Toileting every 2 Hours, in advance of need  - Initiate/Maintain bed/ chair alarm  - Obtain necessary fall risk management equipment: n/a  - Apply yellow socks and bracelet for high fall risk patients  - Consider moving patient to room near nurses station  Outcome: Progressing  Goal: Maintain or return to baseline ADL function  Description: INTERVENTIONS:  -  Assess patient's ability to carry out ADLs; assess patient's baseline for ADL function and identify physical deficits which impact ability to perform ADLs (bathing, care of mouth/teeth, toileting, grooming, dressing, etc.)  - Assess/evaluate cause of self-care deficits   - Assess range of motion  - Assess patient's mobility; develop plan if impaired  - Assess patient's need for assistive devices and provide as appropriate  - Encourage maximum independence but intervene and supervise when necessary  - Involve family in performance of ADLs  - Assess for home care needs following discharge   - Consider OT consult to assist with ADL evaluation and planning for discharge  - Provide patient education as appropriate  Outcome: Progressing  Goal: Maintains/Returns to pre admission functional level  Description: INTERVENTIONS:  - Perform BMAT or MOVE assessment daily.   - Set and communicate daily mobility goal to care team and patient/family/caregiver. - Collaborate with rehabilitation services on mobility goals if consulted  - Perform Range of Motion 3 times a day. - Reposition patient every 2 hours.   - Dangle patient 3 times a day  - Stand patient 3 times a day  - Ambulate patient 3 times a day  - Out of bed to chair 3 times a day   - Out of bed for meals 3 times a day  - Out of bed for toileting  - Record patient progress and toleration of activity level   Outcome: Progressing     Problem: DISCHARGE PLANNING  Goal: Discharge to home or other facility with appropriate resources  Description: INTERVENTIONS:  - Identify barriers to discharge w/patient and caregiver  - Arrange for needed discharge resources and transportation as appropriate  - Identify discharge learning needs (meds, wound care, etc.)  - Arrange for interpretive services to assist at discharge as needed  - Refer to Case Management Department for coordinating discharge planning if the patient needs post-hospital services based on physician/advanced practitioner order or complex needs related to functional status, cognitive ability, or social support system  Outcome: Progressing     Problem: Knowledge Deficit  Goal: Patient/family/caregiver demonstrates understanding of disease process, treatment plan, medications, and discharge instructions  Description: Complete learning assessment and assess knowledge base.   Interventions:  - Provide teaching at level of understanding  - Provide teaching via preferred learning methods  Outcome: Progressing     Problem: RESPIRATORY - ADULT  Goal: Achieves optimal ventilation and oxygenation  Description: INTERVENTIONS:  - Assess for changes in respiratory status  - Assess for changes in mentation and behavior  - Position to facilitate oxygenation and minimize respiratory effort  - Oxygen administered by appropriate delivery if ordered  - Initiate smoking cessation education as indicated  - Encourage broncho-pulmonary hygiene including cough, deep breathe, Incentive Spirometry  - Assess the need for suctioning and aspirate as needed  - Assess and instruct to report SOB or any respiratory difficulty  - Respiratory Therapy support as indicated  Outcome: Progressing     Problem: METABOLIC, FLUID AND ELECTROLYTES - ADULT  Goal: Electrolytes maintained within normal limits  Description: INTERVENTIONS:  - Monitor labs and assess patient for signs and symptoms of electrolyte imbalances  - Administer electrolyte replacement as ordered  - Monitor response to electrolyte replacements, including repeat lab results as appropriate  - Instruct patient on fluid and nutrition as appropriate  Outcome: Progressing  Goal: Fluid balance maintained  Description: INTERVENTIONS:  - Monitor labs   - Monitor I/O and WT  - Instruct patient on fluid and nutrition as appropriate  - Assess for signs & symptoms of volume excess or deficit  Outcome: Progressing  Goal: Glucose maintained within target range  Description: INTERVENTIONS:  - Monitor Blood Glucose as ordered  - Assess for signs and symptoms of hyperglycemia and hypoglycemia  - Administer ordered medications to maintain glucose within target range  - Assess nutritional intake and initiate nutrition service referral as needed  Outcome: Progressing     Problem: CARDIOVASCULAR - ADULT  Goal: Maintains optimal cardiac output and hemodynamic stability  Description: INTERVENTIONS:  - Monitor I/O, vital signs and rhythm  - Monitor for S/S and trends of decreased cardiac output  - Administer and titrate ordered vasoactive medications to optimize hemodynamic stability  - Assess quality of pulses, skin color and temperature  - Assess for signs of decreased coronary artery perfusion  - Instruct patient to report change in severity of symptoms  Outcome: Progressing     Problem: GENITOURINARY - ADULT  Goal: Maintains or returns to baseline urinary function  Description: INTERVENTIONS:  - Assess urinary function  - Encourage oral fluids to ensure adequate hydration if ordered  - Administer IV fluids as ordered to ensure adequate hydration  - Administer ordered medications as needed  - Offer frequent toileting  - Follow urinary retention protocol if ordered  Outcome: Progressing     Problem: SKIN/TISSUE INTEGRITY - ADULT  Goal: Skin Integrity remains intact(Skin Breakdown Prevention)  Description: Assess:  -Perform Jt assessment every shift  -Clean and moisturize skin every shift  -Inspect skin when repositioning, toileting, and assisting with ADLS  -Assess under medical devices such as nasal Cannula every shift  -Assess extremities for adequate circulation and sensation     Bed Management:  -Have minimal linens on bed & keep smooth, unwrinkled  -Change linens as needed when moist or perspiring  -Avoid sitting or lying in one position for more than 2 hours while in bed  -Keep HOB at 30 degrees     Toileting:  -Offer bedside commode  -Assess for incontinence every shift  -Use incontinent care products after each incontinent episode such as moisture barrier    Activity:  -Mobilize patient 3 times a day  -Encourage activity and walks on unit  -Encourage or provide ROM exercises   -Turn and reposition patient every 2 Hours  -Use appropriate equipment to lift or move patient in bed  -Instruct/ Assist with weight shifting every 15 minutes when out of bed in chair  -Consider limitation of chair time 4 hour intervals    Skin Care:  -Avoid use of baby powder, tape, friction and shearing, hot water or constrictive clothing  -Relieve pressure over bony prominences using wedge  -Do not massage red bony areas    Next Steps:  -Teach patient strategies to minimize risks such as pneumonia  -Consider consults to  interdisciplinary teams such as wound care  Outcome: Progressing     Problem: HEMATOLOGIC - ADULT  Goal: Maintains hematologic stability  Description: INTERVENTIONS  - Assess for signs and symptoms of bleeding or hemorrhage  - Monitor labs  - Administer supportive blood products/factors as ordered and appropriate  Outcome: Progressing     Problem: MUSCULOSKELETAL - ADULT  Goal: Maintain or return mobility to safest level of function  Description: INTERVENTIONS:  - Assess patient's ability to carry out ADLs; assess patient's baseline for ADL function and identify physical deficits which impact ability to perform ADLs (bathing, care of mouth/teeth, toileting, grooming, dressing, etc.)  - Assess/evaluate cause of self-care deficits   - Assess range of motion  - Assess patient's mobility  - Assess patient's need for assistive devices and provide as appropriate  - Encourage maximum independence but intervene and supervise when necessary  - Involve family in performance of ADLs  - Assess for home care needs following discharge   - Consider OT consult to assist with ADL evaluation and planning for discharge  - Provide patient education as appropriate  Outcome: Progressing     Problem: Nutrition/Hydration-ADULT  Goal: Nutrient/Hydration intake appropriate for improving, restoring or maintaining nutritional needs  Description: Monitor and assess patient's nutrition/hydration status for malnutrition. Collaborate with interdisciplinary team and initiate plan and interventions as ordered. Monitor patient's weight and dietary intake as ordered or per policy. Utilize nutrition screening tool and intervene as necessary. Determine patient's food preferences and provide high-protein, high-caloric foods as appropriate.      INTERVENTIONS:  - Monitor oral intake, urinary output, labs, and treatment plans  - Assess nutrition and hydration status and recommend course of action  - Evaluate amount of meals eaten  - Assist patient with eating if necessary   - Allow adequate time for meals  - Recommend/ encourage appropriate diets, oral nutritional supplements, and vitamin/mineral supplements  - Order, calculate, and assess calorie counts as needed  - Recommend, monitor, and adjust tube feedings and TPN/PPN based on assessed needs  - Assess need for intravenous fluids  - Provide specific nutrition/hydration education as appropriate  - Include patient/family/caregiver in decisions related to nutrition  Outcome: Progressing     Problem: Potential for Falls  Goal: Patient will remain free of falls  Description: INTERVENTIONS:  - Educate patient/family on patient safety including physical limitations  - Instruct patient to call for assistance with activity   - Consult OT/PT to assist with strengthening/mobility   - Keep Call bell within reach  - Keep bed low and locked with side rails adjusted as appropriate  - Keep care items and personal belongings within reach  - Initiate and maintain comfort rounds  - Make Fall Risk Sign visible to staff  - Offer Toileting every 2 Hours, in advance of need  - Initiate/Maintain bed/ chair alarm  - Obtain necessary fall risk management equipment: n/a  - Apply yellow socks and bracelet for high fall risk patients  - Consider moving patient to room near nurses station  Outcome: Progressing     Problem: MOBILITY - ADULT  Goal: Maintain or return to baseline ADL function  Description: INTERVENTIONS:  -  Assess patient's ability to carry out ADLs; assess patient's baseline for ADL function and identify physical deficits which impact ability to perform ADLs (bathing, care of mouth/teeth, toileting, grooming, dressing, etc.)  - Assess/evaluate cause of self-care deficits   - Assess range of motion  - Assess patient's mobility; develop plan if impaired  - Assess patient's need for assistive devices and provide as appropriate  - Encourage maximum independence but intervene and supervise when necessary  - Involve family in performance of ADLs  - Assess for home care needs following discharge   - Consider OT consult to assist with ADL evaluation and planning for discharge  - Provide patient education as appropriate  Outcome: Progressing  Goal: Maintains/Returns to pre admission functional level  Description: INTERVENTIONS:  - Perform BMAT or MOVE assessment daily.   - Set and communicate daily mobility goal to care team and patient/family/caregiver. - Collaborate with rehabilitation services on mobility goals if consulted  - Perform Range of Motion 3 times a day. - Reposition patient every 2 hours.   - Dangle patient 3 times a day  - Stand patient 3 times a day  - Ambulate patient 3 times a day  - Out of bed to chair 3 times a day   - Out of bed for meals 3 times a day  - Out of bed for toileting  - Record patient progress and toleration of activity level   Outcome: Progressing

## 2023-08-17 NOTE — PROGRESS NOTES
Pulmonary Progress Note  Venessa Shannon 57 y.o. female MRN: [id number]  Unit/Bed#: -01 Encounter: [id number]      Assessment/Plan:  Acute on chronic hypoxic and hypercapnic respiratory failure   Severe COPD with acute exacerbation  Diastolic heart failure, pulmonary hypertension with exacerbation  WILMA on AVAPS  Iron deficiency anemia  Atrial fibrillation on Eliquis  Morbid obesity     • Repeat echo shows increased in PASP to 61mmHg from 48mHg prior  • C/w BiPAP qhs - patient uses AVAPS at home  • wean O2 as able.  Baseline O2 requirement 4LPM at baseline  • transitioned to PO prednisone - no significant airway wheezing  • c/w Xopenex/Atrovent TID  • Diuresis regimen per cardiology/primary team  • S/p IV iron  • I discussed with our specialty medication coordinator - patient will be set up to receive Paulo Plascencia as an outpatient  • Outpatient asthma/COPD regimen - on Bevespi, nebulized albuterol, MDI albuterol at home.      Subjective:  Patient notes some improvement in breathing; weaned to 4 to-5 L. Patient at rest has no dyspnea. She has no fever/cough/sputum. She does have shortness of breath when she is walking to the bathroom    Objective:  Vitals: Vitals personally reviewed  Vitals:    08/17/23 0804 08/17/23 0804 08/17/23 0900 08/17/23 1226   BP: 114/71 114/71  (!) 102/47   Pulse: 103 102  71   Resp:       Temp:       TempSrc:       SpO2:  (!) 89% 99% 99%   Weight:       Height:          Body mass index is 51.41 kg/m². Intake/Output Summary (Last 24 hours) at 8/17/2023 1259  Last data filed at 8/17/2023 1201  Gross per 24 hour   Intake 2160 ml   Output 5175 ml   Net -3015 ml     Invasive Devices     Peripheral Intravenous Line  Duration           Peripheral IV 08/14/23 Dorsal (posterior); Right Hand 3 days                Physical Exam  General: Obese, elderly female sitting in chair, in no acute distress  HEET: head is normocephalic, atraumatic.  Eyes EOMI, no scleral icterus  Neck: Supple, with full range of motion, no appreciable JVD, not using accessory muscles  CV:  Regular rhythm, +S1 S2  Lungs: Diminished breath sounds bilaterally  Abdomen: Soft, non-tender, non-distended  Extremities: No clubbing, cyanosis.  2+ pitting edema in lower extremities up to upper shins  Neuro: No focal motor/sensory deficits.  Patient is oriented to self, place, month/year, and situation  Skin: Warm, No rashes or ulcerations appreciable    Labs: I have personally reviewed pertinent lab results.   Laboratory and Diagnostics  Results from last 7 days   Lab Units 08/17/23  0549 08/16/23  0508 08/15/23  0446 08/14/23  0443 08/13/23  0513 08/12/23  0545 08/11/23  1746 08/11/23  1704   WBC Thousand/uL 18.97* 14.16* 13.87* 13.88* 12.62* 12.68*  --  13.95*   HEMOGLOBIN g/dL 9.1* 8.4* 8.7* 8.4* 8.4* 8.7*  --  9.2*   I STAT HEMOGLOBIN g/dl  --   --   --   --   --   --  10.9*  --    HEMATOCRIT % 31.9* 29.5* 31.3* 29.4* 29.1* 31.1*  --  33.6*   HEMATOCRIT, ISTAT %  --   --   --   --   --   --  32*  --    PLATELETS Thousands/uL 361 311 322 304 273 300  --  350   NEUTROS PCT % 73 68 69 87*  --   --   --  77*   MONOS PCT % 6 8 9 6  --   --   --  6   MONO PCT %  --   --   --   --   --  1*  --   --    EOS PCT % 2 2 1 0  --  1  --  2     Results from last 7 days   Lab Units 08/17/23  0549 08/16/23  0508 08/15/23  0447 08/14/23  0443 08/13/23  0513 08/12/23  1806 08/12/23  1617 08/12/23  0545 08/11/23  1746 08/11/23  1704   SODIUM mmol/L 137 139 138 136 135 128*  --  134*  --  134*   POTASSIUM mmol/L 3.4* 3.4* 3.3* 3.7 3.6 4.4  --  4.2  --  4.9   CHLORIDE mmol/L 91* 93* 93* 92* 91* 86*  --  91*  --  91*   CO2 mmol/L 40* 40* 41* 38* 35* 30  --  35*  --  39*   CO2, I-STAT   --   --   --   --   --   --   --   --    < >  --    ANION GAP mmol/L 6 6 4 6 9 12  --  8  --  4   BUN mg/dL 13 12 16 22 20 20  --  13  --  11   CREATININE mg/dL 0.53* 0.52* 0.54* 0.57* 0.62 0.91  --  0.58*  --  0.59*   CALCIUM mg/dL 7.8* 7.4* 7.4* 7.8* 8.6 8.4  --  8.2*  --  8.0* GLUCOSE RANDOM mg/dL 165* 181* 171* 269* 196* 574* 519* 403*  --  269*   ALT U/L  --   --   --   --  14  --   --   --   --  18   AST U/L  --   --   --   --  8*  --   --   --   --  28   ALK PHOS U/L  --   --   --   --  99  --   --   --   --  119*   ALBUMIN g/dL  --   --   --   --  3.9  --   --   --   --  3.8   TOTAL BILIRUBIN mg/dL  --   --   --   --  0.32  --   --   --   --  0.34    < > = values in this interval not displayed. Results from last 7 days   Lab Units 08/15/23  0447 23  0513 23  0545   MAGNESIUM mg/dL 2.4 2.4 2.4   PHOSPHORUS mg/dL  --   --  4.0      Results from last 7 days   Lab Units 23  1704   INR  1.14   PTT seconds 31          Results from last 7 days   Lab Units 23  0111 23  2217 23  1950 23  1704   LACTIC ACID mmol/L 2.6* 3.0* 2.6* 2.4*     Results from last 7 days   Lab Units 23  0545   FERRITIN ng/mL 7*                 Results from last 7 days   Lab Units 23  0513 23  0545 23  1704   PROCALCITONIN ng/ml 0.06 0.05 0.05           ABG:   Results from last 7 days   Lab Units 23  0545   PH ART  7.369   PCO2 ART mm Hg 62.1*   PO2 ART mm Hg 86.1   HCO3 ART mmol/L 35.0*   BASE EXC ART mmol/L 8.3   ABG SOURCE  Radial, Left     Venous blood gas 7.5 . Mixed metabolic alkalosis and respiratory alkalosis    Imaging and other studies: I have personally reviewed pertinent films in PACS   CXR - pulmonary vascular congestion, worse, +b/l pleural effusions    CXR 23 -no consolidation, effusions, pneumothorax  CT Chest 6/3/23 -scattered areas of hyperlucency likely representing air trapping.  Innumerable small pulmonary nodules appear unchanged from at least     Pulmonary function testin/2022  Results:  FEV1/FVC Ratio: 57 %  Forced Vital Capacity:  1.08L    36 % predicted  FEV1: 0.62 L     26 % predicted  After administration of bronchodilator FEV1: 0.84 (+36%)     Lung volumes by body plethysmography: Total Lung Capacity 102 % predicted Residual volume 175 % predicted  RV/TLC ratio 171%     DLCO corrected for patients hemoglobin level: 54 %     6MWT:  Walked total of 2 mins; distance 109.6m      Echocardiogram:   10/2020•  Left Ventricle: Left ventricular cavity size is normal. Wall thickness is normal. The left ventricular ejection fraction is 60%. Systolic function is normal. Wall motion is normal. Diastolic function is moderately abnormal, consistent with grade II (pseudonormal) relaxation. •  Left Atrium: The atrium is mildly dilated. •  Tricuspid Valve: There is mild regurgitation. The right ventricular systolic pressure is mildly to moderately elevated. The estimated right ventricular systolic pressure is 78.65 mmHg. Code Status: Level 1 - Full Code      Martin Pope MD  Pulmonary, Critical Care and Sleep Medicine  [facility]     Portions of the record may have been created with voice recognition software. Occasional wrong word or "sound a like" substitutions may have occurred due to the inherent limitations of voice recognition software. Please read the chart carefully and recognize, using context, where substitutions have occurred.

## 2023-08-17 NOTE — ASSESSMENT & PLAN NOTE
• Chronically 4L oxygen NC  • In ED placed on BIPAP then weaned to 12L [address] after receiving MERCER neb, 125mg IV solumedrol and 2g IV magnesium  • ABG on 6L oxygen: pH 7.3, pCO2 64.8, pO2 65.3, HCO3 33.7  • Received IV azithromycin and ceftriaxone in ED     Plan  • Consult Pulmonology-appreciate recommendations, will add IV iron x 3 days  • Respiratory protocol  • Wean oxygen to baseline as able  • Patient is currently 14 pounds over recent outpatient weight, 1+ pitting edema bilateral lower extremities.     • Wean oxygen as tolerated

## 2023-08-17 NOTE — PLAN OF CARE
Problem: PAIN - ADULT  Goal: Verbalizes/displays adequate comfort level or baseline comfort level  Description: Interventions:  - Encourage patient to monitor pain and request assistance  - Assess pain using appropriate pain scale  - Administer analgesics based on type and severity of pain and evaluate response  - Implement non-pharmacological measures as appropriate and evaluate response  - Consider cultural and social influences on pain and pain management  - Notify physician/advanced practitioner if interventions unsuccessful or patient reports new pain  Outcome: Progressing     Problem: INFECTION - ADULT  Goal: Absence or prevention of progression during hospitalization  Description: INTERVENTIONS:  - Assess and monitor for signs and symptoms of infection  - Monitor lab/diagnostic results  - Monitor all insertion sites, i.e. indwelling lines, tubes, and drains  - Monitor endotracheal if appropriate and nasal secretions for changes in amount and color  - [place] appropriate cooling/warming therapies per order  - Administer medications as ordered  - Instruct and encourage patient and family to use good hand hygiene technique  - Identify and instruct in appropriate isolation precautions for identified infection/condition  Outcome: Progressing     Problem: SAFETY ADULT  Goal: Patient will remain free of falls  Description: INTERVENTIONS:  - Educate patient/family on patient safety including physical limitations  - Instruct patient to call for assistance with activity   - Consult OT/PT to assist with strengthening/mobility   - Keep Call bell within reach  - Keep bed low and locked with side rails adjusted as appropriate  - Keep care items and personal belongings within reach  - Initiate and maintain comfort rounds  - Make Fall Risk Sign visible to staff  - Offer Toileting every x Hours, in advance of need  - Initiate/Maintain xalarm  - Obtain necessary fall risk management equipment: x  - Apply yellow socks and bracelet for high fall risk patients  - Consider moving patient to room near nurses station  Outcome: Progressing  Goal: Maintain or return to baseline ADL function  Description: INTERVENTIONS:  -  Assess patient's ability to carry out ADLs; assess patient's baseline for ADL function and identify physical deficits which impact ability to perform ADLs (bathing, care of mouth/teeth, toileting, grooming, dressing, etc.)  - Assess/evaluate cause of self-care deficits   - Assess range of motion  - Assess patient's mobility; develop plan if impaired  - Assess patient's need for assistive devices and provide as appropriate  - Encourage maximum independence but intervene and supervise when necessary  - Involve family in performance of ADLs  - Assess for home care needs following discharge   - Consider OT consult to assist with ADL evaluation and planning for discharge  - Provide patient education as appropriate  Outcome: Progressing  Goal: Maintains/Returns to pre admission functional level  Description: INTERVENTIONS:  - Perform BMAT or MOVE assessment daily.   - Set and communicate daily mobility goal to care team and patient/family/caregiver. - Collaborate with rehabilitation services on mobility goals if consulted  - Perform Range of Motion x times a day. - Reposition patient every x hours.   - Dangle patient x times a day  - Stand patient x times a day  - Ambulate patient x times a day  - Out of bed to chair x times a day   - Out of bed for meals x times a day  - Out of bed for toileting  - Record patient progress and toleration of activity level   Outcome: Progressing     Problem: DISCHARGE PLANNING  Goal: Discharge to home or other facility with appropriate resources  Description: INTERVENTIONS:  - Identify barriers to discharge w/patient and caregiver  - Arrange for needed discharge resources and transportation as appropriate  - Identify discharge learning needs (meds, wound care, etc.)  - Arrange for interpretive services to assist at discharge as needed  - Refer to Case Management Department for coordinating discharge planning if the patient needs post-hospital services based on physician/advanced practitioner order or complex needs related to functional status, cognitive ability, or social support system  Outcome: Progressing     Problem: Knowledge Deficit  Goal: Patient/family/caregiver demonstrates understanding of disease process, treatment plan, medications, and discharge instructions  Description: Complete learning assessment and assess knowledge base.   Interventions:  - Provide teaching at level of understanding  - Provide teaching via preferred learning methods  Outcome: Progressing     Problem: RESPIRATORY - ADULT  Goal: Achieves optimal ventilation and oxygenation  Description: INTERVENTIONS:  - Assess for changes in respiratory status  - Assess for changes in mentation and behavior  - Position to facilitate oxygenation and minimize respiratory effort  - Oxygen administered by appropriate delivery if ordered  - Initiate smoking cessation education as indicated  - Encourage broncho-pulmonary hygiene including cough, deep breathe, Incentive Spirometry  - Assess the need for suctioning and aspirate as needed  - Assess and instruct to report SOB or any respiratory difficulty  - Respiratory Therapy support as indicated  Outcome: Progressing     Problem: METABOLIC, FLUID AND ELECTROLYTES - ADULT  Goal: Electrolytes maintained within normal limits  Description: INTERVENTIONS:  - Monitor labs and assess patient for signs and symptoms of electrolyte imbalances  - Administer electrolyte replacement as ordered  - Monitor response to electrolyte replacements, including repeat lab results as appropriate  - Instruct patient on fluid and nutrition as appropriate  Outcome: Progressing  Goal: Fluid balance maintained  Description: INTERVENTIONS:  - Monitor labs   - Monitor I/O and WT  - Instruct patient on fluid and nutrition as appropriate  - Assess for signs & symptoms of volume excess or deficit  Outcome: Progressing  Goal: Glucose maintained within target range  Description: INTERVENTIONS:  - Monitor Blood Glucose as ordered  - Assess for signs and symptoms of hyperglycemia and hypoglycemia  - Administer ordered medications to maintain glucose within target range  - Assess nutritional intake and initiate nutrition service referral as needed  Outcome: Progressing     Problem: Nutrition/Hydration-ADULT  Goal: Nutrient/Hydration intake appropriate for improving, restoring or maintaining nutritional needs  Description: Monitor and assess patient's nutrition/hydration status for malnutrition. Collaborate with interdisciplinary team and initiate plan and interventions as ordered. Monitor patient's weight and dietary intake as ordered or per policy. Utilize nutrition screening tool and intervene as necessary. Determine patient's food preferences and provide high-protein, high-caloric foods as appropriate.      INTERVENTIONS:  - Monitor oral intake, urinary output, labs, and treatment plans  - Assess nutrition and hydration status and recommend course of action  - Evaluate amount of meals eaten  - Assist patient with eating if necessary   - Allow adequate time for meals  - Recommend/ encourage appropriate diets, oral nutritional supplements, and vitamin/mineral supplements  - Order, calculate, and assess calorie counts as needed  - Recommend, monitor, and adjust tube feedings and TPN/PPN based on assessed needs  - Assess need for intravenous fluids  - Provide specific nutrition/hydration education as appropriate  - Include patient/family/caregiver in decisions related to nutrition  Outcome: Progressing     Problem: CARDIOVASCULAR - ADULT  Goal: Maintains optimal cardiac output and hemodynamic stability  Description: INTERVENTIONS:  - Monitor I/O, vital signs and rhythm  - Monitor for S/S and trends of decreased cardiac output  - Administer and titrate ordered vasoactive medications to optimize hemodynamic stability  - Assess quality of pulses, skin color and temperature  - Assess for signs of decreased coronary artery perfusion  - Instruct patient to report change in severity of symptoms  Outcome: Progressing     Problem: GENITOURINARY - ADULT  Goal: Maintains or returns to baseline urinary function  Description: INTERVENTIONS:  - Assess urinary function  - Encourage oral fluids to ensure adequate hydration if ordered  - Administer IV fluids as ordered to ensure adequate hydration  - Administer ordered medications as needed  - Offer frequent toileting  - Follow urinary retention protocol if ordered  Outcome: Progressing     Problem: SKIN/TISSUE INTEGRITY - ADULT  Goal: Skin Integrity remains intact(Skin Breakdown Prevention)  Description: Assess:  -Perform Jt assessment every x  -Clean and moisturize skin every x  -Inspect skin when repositioning, toileting, and assisting with ADLS  -Assess under medical devices such as x every x  -Assess extremities for adequate circulation and sensation     Bed Management:  -Have minimal linens on bed & keep smooth, unwrinkled  -Change linens as needed when moist or perspiring  -Avoid sitting or lying in one position for more than x hours while in bed  -Keep HOB at [facility]     Toileting:  -Offer bedside commode  -Assess for incontinence every x  -Use incontinent care products after each incontinent episode such as x    Activity:  -Mobilize patient x times a day  -Encourage activity and walks on unit  -Encourage or provide ROM exercises   -Turn and reposition patient every x Hours  -Use appropriate equipment to lift or move patient in bed  -Instruct/ Assist with weight shifting every x when out of bed in chair  -Consider limitation of chair time x hour intervals    Skin Care:  -Avoid use of baby powder, tape, friction and shearing, hot water or constrictive clothing  -Relieve pressure over bony prominences using x  -Do not massage red bony areas    Next Steps:  -Teach patient strategies to minimize risks such as x   -Consider consults to  interdisciplinary teams such as x  Outcome: Progressing     Problem: HEMATOLOGIC - ADULT  Goal: Maintains hematologic stability  Description: INTERVENTIONS  - Assess for signs and symptoms of bleeding or hemorrhage  - Monitor labs  - Administer supportive blood products/factors as ordered and appropriate  Outcome: Progressing     Problem: MUSCULOSKELETAL - ADULT  Goal: Maintain or return mobility to safest level of function  Description: INTERVENTIONS:  - Assess patient's ability to carry out ADLs; assess patient's baseline for ADL function and identify physical deficits which impact ability to perform ADLs (bathing, care of mouth/teeth, toileting, grooming, dressing, etc.)  - Assess/evaluate cause of self-care deficits   - Assess range of motion  - Assess patient's mobility  - Assess patient's need for assistive devices and provide as appropriate  - Encourage maximum independence but intervene and supervise when necessary  - Involve family in performance of ADLs  - Assess for home care needs following discharge   - Consider OT consult to assist with ADL evaluation and planning for discharge  - Provide patient education as appropriate  Outcome: Progressing     Problem: Prexisting or High Potential for Compromised Skin Integrity  Goal: Skin integrity is maintained or improved  Description: INTERVENTIONS:  - Identify patients at risk for skin breakdown  - Assess and monitor skin integrity  - Assess and monitor nutrition and hydration status  - Monitor labs   - Assess for incontinence   - Turn and reposition patient  - Assist with mobility/ambulation  - Relieve pressure over bony prominences  - Avoid friction and shearing  - Provide appropriate hygiene as needed including keeping skin clean and dry  - Evaluate need for skin moisturizer/barrier cream  - Collaborate with interdisciplinary team - Patient/family teaching  - Consider wound care consult   Outcome: Progressing     Problem: Potential for Falls  Goal: Patient will remain free of falls  Description: INTERVENTIONS:  - Educate patient/family on patient safety including physical limitations  - Instruct patient to call for assistance with activity   - Consult OT/PT to assist with strengthening/mobility   - Keep Call bell within reach  - Keep bed low and locked with side rails adjusted as appropriate  - Keep care items and personal belongings within reach  - Initiate and maintain comfort rounds  - Make Fall Risk Sign visible to staff  - Offer Toileting every x Hours, in advance of need  - Initiate/Maintain xalarm  - Obtain necessary fall risk management equipment: x  - Apply yellow socks and bracelet for high fall risk patients  - Consider moving patient to room near nurses station  Outcome: Progressing     Problem: MOBILITY - ADULT  Goal: Maintain or return to baseline ADL function  Description: INTERVENTIONS:  -  Assess patient's ability to carry out ADLs; assess patient's baseline for ADL function and identify physical deficits which impact ability to perform ADLs (bathing, care of mouth/teeth, toileting, grooming, dressing, etc.)  - Assess/evaluate cause of self-care deficits   - Assess range of motion  - Assess patient's mobility; develop plan if impaired  - Assess patient's need for assistive devices and provide as appropriate  - Encourage maximum independence but intervene and supervise when necessary  - Involve family in performance of ADLs  - Assess for home care needs following discharge   - Consider OT consult to assist with ADL evaluation and planning for discharge  - Provide patient education as appropriate  Outcome: Progressing  Goal: Maintains/Returns to pre admission functional level  Description: INTERVENTIONS:  - Perform BMAT or MOVE assessment daily.   - Set and communicate daily mobility goal to care team and patient/family/caregiver. - Collaborate with rehabilitation services on mobility goals if consulted  - Perform Range of Motion x times a day. - Reposition patient every x hours.   - Dangle patient x times a day  - Stand patient x times a day  - Ambulate patient x times a day  - Out of bed to chair x times a day   - Out of bed for meals xxx times a day  - Out of bed for toileting  - Record patient progress and toleration of activity level   Outcome: Progressing   x

## 2023-08-17 NOTE — ASSESSMENT & PLAN NOTE
• Home regimen: Albuterol, Bevespi, Budesonide, Benralalizumab, Montelukast, Xopenex  • Presented to ED from home via EMS for SOB since Tuesday. Patient states she has been noncompliant with nebulizer treatments and BIPAP HS at home since being discharged on 7/26/23. She says she has been compliant with inhalers, oral medications and CPAP HS. • In ED placed on BIPAP then weaned to 12L [address] after receiving MERCER neb, 125mg IV solumedrol and 2g IV magnesium  • ABG on 6L oxygen: pH 7.3, pCO2 64.8, pO2 65.3, HCO3 33.7  • Received IV azithromycin and ceftriaxone in ED  • Afebrile, procal 0.05     Plan  • IV Solumedrol 40mg BID --transition to oral prednisone daily on 8/14 if continues to improve  • Xopenex/Atrovent TID  • Pulmicort BID  • Performist BID   • Procalcitonin x2 is negative  • Will monitor off futher ABX. • Respiratory protocol  • Pulmonology consult appreciated  • Patient completed the course of prednisone.

## 2023-08-17 NOTE — ASSESSMENT & PLAN NOTE
Lab Results   Component Value Date    HGBA1C 9.9 (H) 05/11/2023       Recent Labs     08/16/23  1202 08/16/23  1606 08/16/23  2049 08/17/23  0733   POCGLU 269* 341* 225* 181*       Blood Sugar Average: Last 72 hrs:  (P) [id number]   • Home regimen: Metformin, Glargine 30 units daily at bedtime, glulisine 24 units with breakfast, 12 units with lunch and dinner     SSI; Subcutaneous Insulin Order Set  Blood Glucose checks TIDWM and QHS (Q6H for NPO patients)  Hold oral medications  Blood Glucose goal while inpatient is 140-180  Reduce basal insulin by 25-50% while inpatient  Consistent Carbohydrate Diet  -- Blood glucose levels highly elevated; likely secondary to high-dose steroids. Was on insulin drip  -- Was tapered off insulin drip. Continue on Lantus insulin and Humalog with meals and sliding scale  -Patient completed the course of prednisone.   Will continue to monitor blood sugars closely to adjust Lantus insulin

## 2023-08-17 NOTE — PROGRESS NOTES
[address] [facility]  Progress Note  Name: Nicolasa Monzon  MRN: [id number]  Unit/Bed#: -01 I Date of Admission: 8/11/2023   Date of Service: 8/17/2023 I Hospital Day: 6    Assessment/Plan   Class 3 severe obesity in adult [facility])  Assessment & Plan  • Encourage weight loss and lifestyle changes    Lactic acidosis  Assessment & Plan  • LA 2.4->2.6  • Trend       Acute on chronic diastolic congestive heart failure (HCC)  Assessment & Plan  Wt Readings from Last 3 Encounters:   08/17/23 132 kg (290 lb 3.2 oz)   07/26/23 132 kg (291 lb 9.6 oz)   06/08/23 129 kg (284 lb)     • Patient is currently 14 pounds over her most recent outpatient weight  • Chest x-ray shows pulmonary vascular congestion  • Pitting edema present; lung field auscultation difficult given body habitus  • BNP 73  • Received 60mg IV lasix in ED  • On admission, was placed back on torsemide 60 mg twice daily p.o.  • ins and outs, daily weights     Plan  • Low-sodium diabetic diet  • Switch to Lasix 80 mg IV every 8 hours on 8/15  • Strict I/O  • Daily weights  · Trend BMP    Paroxysmal atrial fibrillation (HCC)  Assessment & Plan  • Home regimen: Eliquis, metoprolol  • Currently SR in hospital     Plan  • Continue Eliquis and metoprol    Acute on chronic respiratory failure with hypoxia (HCC)  Assessment & Plan  • Chronically 4L oxygen NC  • In ED placed on BIPAP then weaned to 12L [address] [address] after receiving MERCER neb, 125mg IV solumedrol and 2g IV magnesium  • ABG on 6L oxygen: pH 7.3, pCO2 64.8, pO2 65.3, HCO3 33.7  • Received IV azithromycin and ceftriaxone in ED     Plan  • Consult Pulmonology-appreciate recommendations, will add IV iron x 3 days  • Respiratory protocol  • Wean oxygen to baseline as able  • Patient is currently 14 pounds over recent outpatient weight, 1+ pitting edema bilateral lower extremities.     • Wean oxygen as tolerated    Type 2 diabetes mellitus with stage 2 chronic kidney disease, with long-term current use of insulin [facility])  Assessment & Plan  Lab Results   Component Value Date    HGBA1C 9.9 (H) 05/11/2023       Recent Labs     08/16/23  1202 08/16/23  1606 08/16/23  2049 08/17/23  0733   POCGLU 269* 341* 225* 181*       Blood Sugar Average: Last 72 hrs:  (P) [id number]   • Home regimen: Metformin, Glargine 30 units daily at bedtime, glulisine 24 units with breakfast, 12 units with lunch and dinner     SSI; Subcutaneous Insulin Order Set  Blood Glucose checks TIDWM and QHS (Q6H for NPO patients)  Hold oral medications  Blood Glucose goal while inpatient is 140-180  Reduce basal insulin by 25-50% while inpatient  Consistent Carbohydrate Diet  -- Blood glucose levels highly elevated; likely secondary to high-dose steroids. Was on insulin drip  -- Was tapered off insulin drip. Continue on Lantus insulin and Humalog with meals and sliding scale  -Patient completed the course of prednisone. Will continue to monitor blood sugars closely to adjust Lantus insulin    Obstructive sleep apnea  Assessment & Plan  • Discharged 7/26 with Rx for nocturnal BIPAP  • Patient states she is noncompliant with BIPAP due to personal preference at home and continues to wear CPAP      Plan  • Tolerated BIPAP in hospital -->continue BIPAP HS    * Asthma with COPD with exacerbation ([address])  Assessment & Plan  • Home regimen: Albuterol, Bevespi, Budesonide, Benralalizumab, Montelukast, Xopenex  • Presented to ED from home via EMS for SOB since Tuesday. Patient states she has been noncompliant with nebulizer treatments and BIPAP HS at home since being discharged on 7/26/23. She says she has been compliant with inhalers, oral medications and CPAP HS.    • In ED placed on BIPAP then weaned to 12L [address] after receiving MERCER neb, 125mg IV solumedrol and 2g IV magnesium  • ABG on 6L oxygen: pH 7.3, pCO2 64.8, pO2 65.3, HCO3 33.7  • Received IV azithromycin and ceftriaxone in ED  • Afebrile, procal 0.05     Plan  • IV Solumedrol 40mg BID --transition to oral prednisone daily on 8/14 if continues to improve  • Xopenex/Atrovent TID  • Pulmicort BID  • Performist BID   • Procalcitonin x2 is negative  • Will monitor off futher ABX. • Respiratory protocol  • Pulmonology consult appreciated  • Patient completed the course of prednisone. Labs & Imaging: I have personally reviewed pertinent reports. VTE Prophylaxis: in place. Code Status:   Level 1 - Full Code    Patient Centered Rounds: I have performed bedside rounds with nursing staff today. Discussions with Specialists or Other Care Team Provider: Cards    Education and Discussions with Family / Patient: Patient declined family update    Current Length of Stay: 6 day(s)    Current Patient Status: Inpatient   Certification Statement: The patient will continue to require additional inpatient hospital stay due to see my assessment and plan. Subjective:   Patient is seen and examined at bedside. States She Feels Better. Denies Any New Complaints. Afebrile. On BiPAP  All other ROS are negative. Objective:    Vitals: Blood pressure 102/51, pulse 66, temperature 98.4 °F (36.9 °C), resp. rate 19, height 5' 3" (1.6 m), weight 132 kg (290 lb 3.2 oz), SpO2 97 %, not currently breastfeeding. ,Body mass index is 51.41 kg/m². SPO2 RA Rest    Flowsheet Row ED to Hosp-Admission (Current) from 8/11/2023 in [facility]   SpO2 97 %   SpO2 Activity At Rest   O2 Device BiPAP   O2 Flow Rate --        I&O:     Intake/Output Summary (Last 24 hours) at 8/17/2023 0800  Last data filed at 8/17/2023 0501  Gross per 24 hour   Intake 1720 ml   Output 4825 ml   Net -3105 ml       Physical Exam:    General- Alert, lying comfortably in bed. Not in any acute distress.   Neck- Supple, No JVD  CVS- regular, S1 and S2 normal  Chest- Bilateral Air entry, decreased at bases  Abdomen- soft, nontender, not distended, no guarding or rigidity, BS+  Extremities-has pedal edema, No calf tenderness                         Normal ROM in all extremities. CNS-   Alert, awake and orientedx3. No focal deficits present. Invasive Devices     Peripheral Intravenous Line  Duration           Peripheral IV 08/14/23 Dorsal (posterior); Right Hand 3 days                      Social History  reviewed  Family History   Problem Relation Age of Onset   • Alzheimer's disease Mother    • Atrial fibrillation Mother    • Dementia Mother    • Heart disease Mother         heart problem   • Seizures Mother    • Parkinsonism Father    • Arthritis Father    • Atrial fibrillation Father    • No Known Problems Daughter    • Cri-du-chat syndrome Daughter    • Colon cancer Maternal Grandmother 80   • Diabetes type II Maternal Grandmother    • No Known Problems Brother    • No Known Problems Son    • Substance Abuse Neg Hx         mother,father   • Mental illness Neg Hx         mother,father   • Colon polyps Neg Hx     reviewed    Meds:  Current Facility-Administered Medications   Medication Dose Route Frequency Provider Last Rate Last Admin   • acetaminophen (TYLENOL) tablet 650 mg  650 mg Oral Q6H PRN Dahlia Simons PA-C   650 mg at 08/16/23 2055   • apixaban (ELIQUIS) tablet 5 mg  5 mg Oral BID Dahlia Simons PA-C   5 mg at 08/16/23 1722   • atorvastatin (LIPITOR) tablet 40 mg  40 mg Oral Daily Dahlia Simons PA-C   40 mg at 08/16/23 0834   • budesonide (PULMICORT) inhalation solution 0.5 mg  0.5 mg Nebulization Q12H Dahlia Simons PA-C   0.5 mg at 08/17/23 0710   • diphenhydrAMINE (BENADRYL) injection 25 mg  25 mg Intravenous Q6H PRN Samy Thayer MD   25 mg at 08/16/23 6911   • escitalopram (LEXAPRO) tablet 20 mg  20 mg Oral Daily Dahlia Simons PA-C   20 mg at 08/16/23 0834   • fluticasone (FLONASE) 50 mcg/act nasal spray 1 spray  1 spray Nasal BID Dahlia Simons PA-C   1 spray at 08/16/23 2050   • formoterol (PERFOROMIST) nebulizer solution 20 mcg  20 mcg Nebulization Q12H Dahlia Simons PA-C   20 mcg at 08/17/23 0710   • furosemide (LASIX) injection 80 mg  80 mg Intravenous TID (diuretic) Marino Harrell PA-C   80 mg at 08/17/23 0559   • insulin glargine (LANTUS) subcutaneous injection 50 Units 0.5 mL  50 Units Subcutaneous HS Dionicio Short MD   50 Units at 08/16/23 2050   • insulin lispro (HumaLOG) 100 units/mL subcutaneous injection 20 Units  20 Units Subcutaneous TID With Meals Dionicio Short MD   20 Units at 08/16/23 1722   • insulin lispro (HumaLOG) 100 units/mL subcutaneous injection 4-20 Units  4-20 Units Subcutaneous TID AC Dionicio Short MD   16 Units at 08/16/23 1722   • ipratropium (ATROVENT) 0.02 % inhalation solution 0.5 mg  0.5 mg Nebulization TID Watt MARIELA Balbuena-C   0.5 mg at 08/17/23 0710   • levalbuterol (Gabriella Matthew) inhalation solution 1.25 mg  1.25 mg Nebulization TID Watt MARIELA Balbuena-C   1.25 mg at 08/17/23 0710   • loratadine (CLARITIN) tablet 10 mg  10 mg Oral Daily Watt Sree PA-C   10 mg at 08/16/23 4996   • LORazepam (ATIVAN) tablet 1 mg  1 mg Oral HS Watt Shawfer PA-C   1 mg at 08/16/23 2050   • metoprolol succinate (TOPROL-XL) 24 hr tablet 100 mg  100 mg Oral Daily Watt Sree PA-C   100 mg at 08/16/23 3896   • montelukast (SINGULAIR) tablet 10 mg  10 mg Oral HS Watt Shawl, PA-C   10 mg at 08/16/23 2050   • nystatin (MYCOSTATIN) powder   Topical BID Sameer Rahman MD   1 Application at 10/66/31 1724   • pantoprazole (PROTONIX) EC tablet 40 mg  40 mg Oral Early Morning Watt Sree PA-C   40 mg at 08/17/23 0559   • potassium chloride (K-DUR,KLOR-CON) CR tablet 40 mEq  40 mEq Oral Daily Marino Harrell PA-C       • traZODone (DESYREL) tablet 150 mg  150 mg Oral HS Sachi Balbuena PA-C   150 mg at 08/16/23 2050      Medications Prior to Admission   Medication   • apixaban (Eliquis) 5 mg   • atorvastatin (LIPITOR) 40 mg tablet   • escitalopram (LEXAPRO) 20 mg tablet   • fluticasone (FLONASE) 50 mcg/act nasal spray   • insulin glulisine (Apidra SoloStar) 100 units/mL injection pen   • loratadine (CLARITIN) 10 mg tablet   • LORazepam (ATIVAN) 0.5 mg tablet   • metFORMIN (GLUCOPHAGE-XR) 500 mg 24 hr tablet   • metoprolol succinate (TOPROL-XL) 100 mg 24 hr tablet   • montelukast (SINGULAIR) 10 mg tablet   • omeprazole (PriLOSEC) 40 MG capsule   • potassium chloride (Klor-Con M20) 20 mEq tablet   • torsemide (DEMADEX) 20 mg tablet   • traZODone (DESYREL) 150 mg tablet   • albuterol (2.5 mg/3 mL) 0.083 % nebulizer solution   • albuterol (PROVENTIL HFA,VENTOLIN HFA) 90 mcg/act inhaler   • Benralizumab 30 MG/ML SOAJ   • Blood Glucose Monitoring Suppl (I AM AT CONTOUR NEXT MONITOR) w/Device KIT   • Contour Next Test test strip   • CVS Lancets Thin 26G MISC   • EPINEPHrine (EPIPEN) 0.3 mg/0.3 mL SOAJ   • ferrous sulfate 220 (44 Fe) mg/5 mL solution   • glycopyrrolate-formoterol (BEVESPI AEROSPHERE) 9-4.8 MCG/ACT inhaler   • Insulin Pen Needle (BD Pen Needle Love 2nd Gen) 32G X 4 MM MISC   • levalbuterol (XOPENEX) 1.25 mg/0.5 mL nebulizer solution   • naloxone (NARCAN) 4 mg/0.1 mL nasal spray   • semaglutide, 0.25 or 0.5 mg/dose, (Ozempic, 0.25 or 0.5 MG/DOSE,) 2 mg/3 mL injection pen       Labs:  Results from last 7 days   Lab Units 08/17/23  0549 08/16/23  0508 08/15/23  0446   WBC Thousand/uL 18.97* 14.16* 13.87*   HEMOGLOBIN g/dL 9.1* 8.4* 8.7*   HEMATOCRIT % 31.9* 29.5* 31.3*   PLATELETS Thousands/uL 361 311 322   NEUTROS PCT % 73 68 69   LYMPHS PCT % 17 21 20   MONOS PCT % 6 8 9   EOS PCT % 2 2 1     Results from last 7 days   Lab Units 08/17/23  0549 08/16/23  0508 08/15/23  0447 08/14/23  0443 08/13/23  0513 08/12/23  0545 08/11/23  1746 08/11/23  1704   POTASSIUM mmol/L 3.4* 3.4* 3.3*   < > 3.6   < >  --  4.9   CHLORIDE mmol/L 91* 93* 93*   < > 91*   < >  --  91*   CO2 mmol/L 40* 40* 41*   < > 35*   < >  --  39*   CO2, I-STAT mmol/L  --   --   --   --   --   --  44*  --    BUN mg/dL 13 12 16   < > 20   < >  --  11   CREATININE mg/dL 0.53* 0.52* 0.54*   < > 0.62   < >  --  0.59*   CALCIUM mg/dL 7.8* 7.4* 7.4*   < > 8.6   < >  --  8.0*   ALK PHOS U/L  --   --   --   --  99  --   --  119*   ALT U/L  --   --   --   --  14  --   --  18   AST U/L  --   --   --   --  8*  --   --  28   GLUCOSE, ISTAT mg/dl  --   --   --   --   --   --  252*  --     < > = values in this interval not displayed. Lab Results   Component Value Date    TROPONINI <0.02 06/03/2021    TROPONINI <0.02 06/03/2021    TROPONINI <0.02 06/03/2021    CKTOTAL 39 09/07/2017     Results from last 7 days   Lab Units 08/11/23  1704   INR  1.14     Lab Results   Component Value Date    BLOODCX No Growth After 5 Days. 08/11/2023    BLOODCX No Growth After 5 Days. 08/11/2023    BLOODCX No Growth After 5 Days. 06/03/2023    SPUTUMCULTUR 2+ Growth of 05/21/2020    SPUTUMCULTUR  05/21/2020     Commensal respiratory nicholas only; No significant growth of Staph aureus/MRSA or Pseudomonas aeruginosa. Imaging:  Results for orders placed during the hospital encounter of 08/11/23    XR chest portable    Narrative  CHEST    INDICATION:   sob. COMPARISON: 8/14/2023    EXAM PERFORMED/VIEWS:  XR CHEST PORTABLE      FINDINGS:    Heart is top normal in size. Pulmonary vascular congestion and interstitial prominence is improved. No pneumothorax or pleural effusion. Osseous structures appear within normal limits for patient age. Impression  Slight improvement of pulmonary vascular congestion. Workstation performed: [id number]    Results for orders placed during the hospital encounter of 09/27/22    XR chest pa & lateral    Narrative  CHEST    INDICATION:   J44.9: Chronic obstructive pulmonary disease, unspecified  R06.02: Shortness of breath. COMPARISON:  6/5/2021    EXAM PERFORMED/VIEWS:  XR CHEST PA & LATERAL      FINDINGS:    Cardiomediastinal silhouette appears unremarkable. Mild arch calcifications again noted. Mild emphysematous changes are seen.   Bilateral lower lobe hazy pulmonary infiltrates and/or subsegmental atelectasis noted. Upper to midlung fields appear adequately aerated and clear. No pneumothorax or pleural effusion. Osseous structures appear within normal limits for patient age. No free air in the upper abdomen. Impression  Mild emphysematous changes are seen. Bilateral lower lobe hazy pulmonary infiltrates and/or subsegmental atelectasis noted. Upper to midlung fields appear adequately aerated and clear.           Workstation performed: [id number]      Last 24 Hours Medication List:   Current Facility-Administered Medications   Medication Dose Route Frequency Provider Last Rate   • acetaminophen  650 mg Oral Q6H PRN Talisha Arnett PA-C     • apixaban  5 mg Oral BID Talisha Arnett PA-C     • atorvastatin  40 mg Oral Daily Talisha Arnett PA-C     • budesonide  0.5 mg Nebulization Q12H Talisha Arnett PA-C     • diphenhydrAMINE  25 mg Intravenous Q6H PRN Precious Griffin MD     • escitalopram  20 mg Oral Daily Talisha Arnett PA-C     • fluticasone  1 spray Nasal BID Talisha Arnett PA-C     • formoterol  20 mcg Nebulization Q12H Talisha Arnett PA-C     • furosemide  80 mg Intravenous TID (diuretic) Tracy Trever Harrell PA-C     • insulin glargine  50 Units Subcutaneous HS Lorenzo Turner MD     • insulin lispro  20 Units Subcutaneous TID With Meals Lorenzo Turner MD     • insulin lispro  4-20 Units Subcutaneous TID AC Lorenzo Turner MD     • ipratropium  0.5 mg Nebulization TID Talisha Arnett PA-C     • levalbuterol  1.25 mg Nebulization TID Talisha Arnett PA-C     • loratadine  10 mg Oral Daily Talisha Arnett PA-C     • LORazepam  1 mg Oral HS Talisha Arnett PA-C     • metoprolol succinate  100 mg Oral Daily Talisha Arnett PA-C     • montelukast  10 mg Oral HS Talisha Arnett PA-C     • nystatin   Topical BID Precious Griffin MD     • pantoprazole  40 mg Oral Early Morning Talisha Arnett PA-C     • potassium chloride  40 mEq Oral Daily Bryce Harrell PA-C     • traZODone  150 mg Oral HS Cristobal Leon PA-C          Today, Patient Was Seen By: Te Mahmood MD    ** Please Note: Dictation voice to text software may have been used in the creation of this document.  **

## 2023-08-18 DIAGNOSIS — J82.83 EOSINOPHILIC ASTHMA: Primary | ICD-10-CM

## 2023-08-18 LAB
ANION GAP SERPL CALCULATED.3IONS-SCNC: 7 MMOL/L
BASOPHILS # BLD AUTO: 0.05 THOUSANDS/ÂΜL (ref 0–0.1)
BASOPHILS NFR BLD AUTO: 0 % (ref 0–1)
BUN SERPL-MCNC: 12 MG/DL (ref 5–25)
CALCIUM SERPL-MCNC: 8.1 MG/DL (ref 8.4–10.2)
CHLORIDE SERPL-SCNC: 94 MMOL/L (ref 96–108)
CO2 SERPL-SCNC: 38 MMOL/L (ref 21–32)
CREAT SERPL-MCNC: 0.54 MG/DL (ref 0.6–1.3)
EOSINOPHIL # BLD AUTO: 0.48 THOUSAND/ÂΜL (ref 0–0.61)
EOSINOPHIL NFR BLD AUTO: 3 % (ref 0–6)
ERYTHROCYTE [DISTWIDTH] IN BLOOD BY AUTOMATED COUNT: 21.1 % (ref 11.6–15.1)
GFR SERPL CREATININE-BSD FRML MDRD: 100 ML/MIN/1.73SQ M
GLUCOSE SERPL-MCNC: 109 MG/DL (ref 65–140)
GLUCOSE SERPL-MCNC: 184 MG/DL (ref 65–140)
GLUCOSE SERPL-MCNC: 186 MG/DL (ref 65–140)
GLUCOSE SERPL-MCNC: 224 MG/DL (ref 65–140)
GLUCOSE SERPL-MCNC: 231 MG/DL (ref 65–140)
HCT VFR BLD AUTO: 33.2 % (ref 34.8–46.1)
HGB BLD-MCNC: 9.4 G/DL (ref 11.5–15.4)
IMM GRANULOCYTES # BLD AUTO: 0.27 THOUSAND/UL (ref 0–0.2)
IMM GRANULOCYTES NFR BLD AUTO: 2 % (ref 0–2)
LYMPHOCYTES # BLD AUTO: 3.08 THOUSANDS/ÂΜL (ref 0.6–4.47)
LYMPHOCYTES NFR BLD AUTO: 18 % (ref 14–44)
MAGNESIUM SERPL-MCNC: 2.4 MG/DL (ref 1.9–2.7)
MCH RBC QN AUTO: 19.4 PG (ref 26.8–34.3)
MCHC RBC AUTO-ENTMCNC: 28.3 G/DL (ref 31.4–37.4)
MCV RBC AUTO: 69 FL (ref 82–98)
MONOCYTES # BLD AUTO: 1.09 THOUSAND/ÂΜL (ref 0.17–1.22)
MONOCYTES NFR BLD AUTO: 7 % (ref 4–12)
NEUTROPHILS # BLD AUTO: 11.87 THOUSANDS/ÂΜL (ref 1.85–7.62)
NEUTS SEG NFR BLD AUTO: 70 % (ref 43–75)
NRBC BLD AUTO-RTO: 0 /100 WBCS
PLATELET # BLD AUTO: 353 THOUSANDS/UL (ref 149–390)
PMV BLD AUTO: 10.6 FL (ref 8.9–12.7)
POTASSIUM SERPL-SCNC: 3.7 MMOL/L (ref 3.5–5.3)
RBC # BLD AUTO: 4.84 MILLION/UL (ref 3.81–5.12)
SODIUM SERPL-SCNC: 139 MMOL/L (ref 135–147)
WBC # BLD AUTO: 16.84 THOUSAND/UL (ref 4.31–10.16)

## 2023-08-18 PROCEDURE — 83735 ASSAY OF MAGNESIUM: CPT | Performed by: INTERNAL MEDICINE

## 2023-08-18 PROCEDURE — 99232 SBSQ HOSP IP/OBS MODERATE 35: CPT | Performed by: INTERNAL MEDICINE

## 2023-08-18 PROCEDURE — 94660 CPAP INITIATION&MGMT: CPT

## 2023-08-18 PROCEDURE — 94760 N-INVAS EAR/PLS OXIMETRY 1: CPT

## 2023-08-18 PROCEDURE — 82948 REAGENT STRIP/BLOOD GLUCOSE: CPT

## 2023-08-18 PROCEDURE — 80048 BASIC METABOLIC PNL TOTAL CA: CPT | Performed by: INTERNAL MEDICINE

## 2023-08-18 PROCEDURE — 85025 COMPLETE CBC W/AUTO DIFF WBC: CPT | Performed by: INTERNAL MEDICINE

## 2023-08-18 PROCEDURE — 94640 AIRWAY INHALATION TREATMENT: CPT

## 2023-08-18 RX ORDER — EPINEPHRINE 1 MG/ML
0.3 INJECTION, SOLUTION, CONCENTRATE INTRAVENOUS
OUTPATIENT
Start: 2023-08-28

## 2023-08-18 RX ADMIN — LEVALBUTEROL HYDROCHLORIDE 1.25 MG: 1.25 SOLUTION RESPIRATORY (INHALATION) at 19:50

## 2023-08-18 RX ADMIN — POTASSIUM CHLORIDE 40 MEQ: 1500 TABLET, EXTENDED RELEASE ORAL at 17:17

## 2023-08-18 RX ADMIN — FUROSEMIDE 80 MG: 10 INJECTION, SOLUTION INTRAMUSCULAR; INTRAVENOUS at 12:51

## 2023-08-18 RX ADMIN — INSULIN LISPRO 20 UNITS: 100 INJECTION, SOLUTION INTRAVENOUS; SUBCUTANEOUS at 17:17

## 2023-08-18 RX ADMIN — INSULIN LISPRO 4 UNITS: 100 INJECTION, SOLUTION INTRAVENOUS; SUBCUTANEOUS at 08:22

## 2023-08-18 RX ADMIN — BUDESONIDE 0.5 MG: 0.5 INHALANT ORAL at 07:15

## 2023-08-18 RX ADMIN — IPRATROPIUM BROMIDE 0.5 MG: 0.5 SOLUTION RESPIRATORY (INHALATION) at 13:03

## 2023-08-18 RX ADMIN — FUROSEMIDE 80 MG: 10 INJECTION, SOLUTION INTRAMUSCULAR; INTRAVENOUS at 17:18

## 2023-08-18 RX ADMIN — ESCITALOPRAM OXALATE 20 MG: 20 TABLET ORAL at 08:22

## 2023-08-18 RX ADMIN — BUDESONIDE 0.5 MG: 0.5 INHALANT ORAL at 19:50

## 2023-08-18 RX ADMIN — INSULIN GLARGINE 50 UNITS: 100 INJECTION, SOLUTION SUBCUTANEOUS at 22:10

## 2023-08-18 RX ADMIN — LEVALBUTEROL HYDROCHLORIDE 1.25 MG: 1.25 SOLUTION RESPIRATORY (INHALATION) at 07:15

## 2023-08-18 RX ADMIN — LORATADINE 10 MG: 10 TABLET ORAL at 08:22

## 2023-08-18 RX ADMIN — INSULIN LISPRO 20 UNITS: 100 INJECTION, SOLUTION INTRAVENOUS; SUBCUTANEOUS at 08:22

## 2023-08-18 RX ADMIN — IPRATROPIUM BROMIDE 0.5 MG: 0.5 SOLUTION RESPIRATORY (INHALATION) at 19:50

## 2023-08-18 RX ADMIN — APIXABAN 5 MG: 5 TABLET, FILM COATED ORAL at 17:17

## 2023-08-18 RX ADMIN — INSULIN LISPRO 20 UNITS: 100 INJECTION, SOLUTION INTRAVENOUS; SUBCUTANEOUS at 12:54

## 2023-08-18 RX ADMIN — IPRATROPIUM BROMIDE 0.5 MG: 0.5 SOLUTION RESPIRATORY (INHALATION) at 07:15

## 2023-08-18 RX ADMIN — APIXABAN 5 MG: 5 TABLET, FILM COATED ORAL at 08:22

## 2023-08-18 RX ADMIN — LEVALBUTEROL HYDROCHLORIDE 1.25 MG: 1.25 SOLUTION RESPIRATORY (INHALATION) at 13:03

## 2023-08-18 RX ADMIN — MONTELUKAST 10 MG: 10 TABLET, FILM COATED ORAL at 22:11

## 2023-08-18 RX ADMIN — INSULIN LISPRO 4 UNITS: 100 INJECTION, SOLUTION INTRAVENOUS; SUBCUTANEOUS at 17:17

## 2023-08-18 RX ADMIN — FUROSEMIDE 80 MG: 10 INJECTION, SOLUTION INTRAMUSCULAR; INTRAVENOUS at 05:01

## 2023-08-18 RX ADMIN — INSULIN LISPRO 8 UNITS: 100 INJECTION, SOLUTION INTRAVENOUS; SUBCUTANEOUS at 12:52

## 2023-08-18 RX ADMIN — LORAZEPAM 1 MG: 1 TABLET ORAL at 22:11

## 2023-08-18 RX ADMIN — FLUTICASONE PROPIONATE 1 SPRAY: 50 SPRAY, METERED NASAL at 22:10

## 2023-08-18 RX ADMIN — POTASSIUM CHLORIDE 40 MEQ: 1500 TABLET, EXTENDED RELEASE ORAL at 08:21

## 2023-08-18 RX ADMIN — TRAZODONE HYDROCHLORIDE 150 MG: 150 TABLET ORAL at 22:11

## 2023-08-18 RX ADMIN — FORMOTEROL FUMARATE DIHYDRATE 20 MCG: 20 SOLUTION RESPIRATORY (INHALATION) at 19:50

## 2023-08-18 RX ADMIN — ACETAMINOPHEN 325MG 650 MG: 325 TABLET ORAL at 22:12

## 2023-08-18 RX ADMIN — NYSTATIN: 100000 POWDER TOPICAL at 08:21

## 2023-08-18 RX ADMIN — ATORVASTATIN CALCIUM 40 MG: 40 TABLET, FILM COATED ORAL at 08:22

## 2023-08-18 RX ADMIN — PANTOPRAZOLE SODIUM 40 MG: 40 TABLET, DELAYED RELEASE ORAL at 05:01

## 2023-08-18 RX ADMIN — FLUTICASONE PROPIONATE 1 SPRAY: 50 SPRAY, METERED NASAL at 08:21

## 2023-08-18 RX ADMIN — FORMOTEROL FUMARATE DIHYDRATE 20 MCG: 20 SOLUTION RESPIRATORY (INHALATION) at 07:15

## 2023-08-18 NOTE — ASSESSMENT & PLAN NOTE
• Home regimen: Albuterol, Bevespi, Budesonide, Benralalizumab, Montelukast, Xopenex  • Presented to ED from home via EMS for SOB since Tuesday. Patient states she has been noncompliant with nebulizer treatments and BIPAP HS at home since being discharged on 7/26/23. She says she has been compliant with inhalers, oral medications and CPAP HS. • In ED placed on BIPAP then weaned to 12L [address] after receiving MERCER neb, 125mg IV solumedrol and 2g IV magnesium  • ABG on 6L oxygen: pH 7.3, pCO2 64.8, pO2 65.3, HCO3 33.7  • Received IV azithromycin and ceftriaxone in ED  • Afebrile, procal 0.05     Plan  • IV Solumedrol 40mg BID on admission   • Xopenex/Atrovent TID  • Pulmicort BID  • Performist BID   • Procalcitonin x2 is negative  • Will monitor off futher ABX. • Respiratory protocol  • Pulmonology consult appreciated  • Patient completed the course of prednisone.

## 2023-08-18 NOTE — PLAN OF CARE
Problem: PAIN - ADULT  Goal: Verbalizes/displays adequate comfort level or baseline comfort level  Description: Interventions:  - Encourage patient to monitor pain and request assistance  - Assess pain using appropriate pain scale  - Administer analgesics based on type and severity of pain and evaluate response  - Implement non-pharmacological measures as appropriate and evaluate response  - Consider cultural and social influences on pain and pain management  - Notify physician/advanced practitioner if interventions unsuccessful or patient reports new pain  Outcome: Progressing     Problem: INFECTION - ADULT  Goal: Absence or prevention of progression during hospitalization  Description: INTERVENTIONS:  - Assess and monitor for signs and symptoms of infection  - Monitor lab/diagnostic results  - Monitor all insertion sites, i.e. indwelling lines, tubes, and drains  - Monitor endotracheal if appropriate and nasal secretions for changes in amount and color  - [place] appropriate cooling/warming therapies per order  - Administer medications as ordered  - Instruct and encourage patient and family to use good hand hygiene technique  - Identify and instruct in appropriate isolation precautions for identified infection/condition  Outcome: Progressing     Problem: SAFETY ADULT  Goal: Patient will remain free of falls  Description: INTERVENTIONS:  - Educate patient/family on patient safety including physical limitations  - Instruct patient to call for assistance with activity   - Consult OT/PT to assist with strengthening/mobility   - Keep Call bell within reach  - Keep bed low and locked with side rails adjusted as appropriate  - Keep care items and personal belongings within reach  - Initiate and maintain comfort rounds  - Make Fall Risk Sign visible to staff  - Offer Toileting every 2 Hours, in advance of need  - Initiate/Maintain bed/ chair alarm  - Obtain necessary fall risk management equipment: n/a  - Apply yellow socks and bracelet for high fall risk patients  - Consider moving patient to room near nurses station  Outcome: Progressing  Goal: Maintain or return to baseline ADL function  Description: INTERVENTIONS:  -  Assess patient's ability to carry out ADLs; assess patient's baseline for ADL function and identify physical deficits which impact ability to perform ADLs (bathing, care of mouth/teeth, toileting, grooming, dressing, etc.)  - Assess/evaluate cause of self-care deficits   - Assess range of motion  - Assess patient's mobility; develop plan if impaired  - Assess patient's need for assistive devices and provide as appropriate  - Encourage maximum independence but intervene and supervise when necessary  - Involve family in performance of ADLs  - Assess for home care needs following discharge   - Consider OT consult to assist with ADL evaluation and planning for discharge  - Provide patient education as appropriate  Outcome: Progressing  Goal: Maintains/Returns to pre admission functional level  Description: INTERVENTIONS:  - Perform BMAT or MOVE assessment daily.   - Set and communicate daily mobility goal to care team and patient/family/caregiver. - Collaborate with rehabilitation services on mobility goals if consulted  - Perform Range of Motion 3 times a day. - Reposition patient every 2 hours.   - Dangle patient 3 times a day  - Stand patient 3 times a day  - Ambulate patient 3 times a day  - Out of bed to chair 3 times a day   - Out of bed for meals 3 times a day  - Out of bed for toileting  - Record patient progress and toleration of activity level   Outcome: Progressing     Problem: DISCHARGE PLANNING  Goal: Discharge to home or other facility with appropriate resources  Description: INTERVENTIONS:  - Identify barriers to discharge w/patient and caregiver  - Arrange for needed discharge resources and transportation as appropriate  - Identify discharge learning needs (meds, wound care, etc.)  - Arrange for interpretive services to assist at discharge as needed  - Refer to Case Management Department for coordinating discharge planning if the patient needs post-hospital services based on physician/advanced practitioner order or complex needs related to functional status, cognitive ability, or social support system  Outcome: Progressing     Problem: Knowledge Deficit  Goal: Patient/family/caregiver demonstrates understanding of disease process, treatment plan, medications, and discharge instructions  Description: Complete learning assessment and assess knowledge base.   Interventions:  - Provide teaching at level of understanding  - Provide teaching via preferred learning methods  Outcome: Progressing     Problem: RESPIRATORY - ADULT  Goal: Achieves optimal ventilation and oxygenation  Description: INTERVENTIONS:  - Assess for changes in respiratory status  - Assess for changes in mentation and behavior  - Position to facilitate oxygenation and minimize respiratory effort  - Oxygen administered by appropriate delivery if ordered  - Initiate smoking cessation education as indicated  - Encourage broncho-pulmonary hygiene including cough, deep breathe, Incentive Spirometry  - Assess the need for suctioning and aspirate as needed  - Assess and instruct to report SOB or any respiratory difficulty  - Respiratory Therapy support as indicated  Outcome: Progressing     Problem: METABOLIC, FLUID AND ELECTROLYTES - ADULT  Goal: Electrolytes maintained within normal limits  Description: INTERVENTIONS:  - Monitor labs and assess patient for signs and symptoms of electrolyte imbalances  - Administer electrolyte replacement as ordered  - Monitor response to electrolyte replacements, including repeat lab results as appropriate  - Instruct patient on fluid and nutrition as appropriate  Outcome: Progressing  Goal: Fluid balance maintained  Description: INTERVENTIONS:  - Monitor labs   - Monitor I/O and WT  - Instruct patient on fluid and nutrition as appropriate  - Assess for signs & symptoms of volume excess or deficit  Outcome: Progressing  Goal: Glucose maintained within target range  Description: INTERVENTIONS:  - Monitor Blood Glucose as ordered  - Assess for signs and symptoms of hyperglycemia and hypoglycemia  - Administer ordered medications to maintain glucose within target range  - Assess nutritional intake and initiate nutrition service referral as needed  Outcome: Progressing     Problem: Nutrition/Hydration-ADULT  Goal: Nutrient/Hydration intake appropriate for improving, restoring or maintaining nutritional needs  Description: Monitor and assess patient's nutrition/hydration status for malnutrition. Collaborate with interdisciplinary team and initiate plan and interventions as ordered. Monitor patient's weight and dietary intake as ordered or per policy. Utilize nutrition screening tool and intervene as necessary. Determine patient's food preferences and provide high-protein, high-caloric foods as appropriate.      INTERVENTIONS:  - Monitor oral intake, urinary output, labs, and treatment plans  - Assess nutrition and hydration status and recommend course of action  - Evaluate amount of meals eaten  - Assist patient with eating if necessary   - Allow adequate time for meals  - Recommend/ encourage appropriate diets, oral nutritional supplements, and vitamin/mineral supplements  - Order, calculate, and assess calorie counts as needed  - Recommend, monitor, and adjust tube feedings and TPN/PPN based on assessed needs  - Assess need for intravenous fluids  - Provide specific nutrition/hydration education as appropriate  - Include patient/family/caregiver in decisions related to nutrition  Outcome: Progressing     Problem: CARDIOVASCULAR - ADULT  Goal: Maintains optimal cardiac output and hemodynamic stability  Description: INTERVENTIONS:  - Monitor I/O, vital signs and rhythm  - Monitor for S/S and trends of decreased cardiac output  - Administer and titrate ordered vasoactive medications to optimize hemodynamic stability  - Assess quality of pulses, skin color and temperature  - Assess for signs of decreased coronary artery perfusion  - Instruct patient to report change in severity of symptoms  Outcome: Progressing     Problem: GENITOURINARY - ADULT  Goal: Maintains or returns to baseline urinary function  Description: INTERVENTIONS:  - Assess urinary function  - Encourage oral fluids to ensure adequate hydration if ordered  - Administer IV fluids as ordered to ensure adequate hydration  - Administer ordered medications as needed  - Offer frequent toileting  - Follow urinary retention protocol if ordered  Outcome: Progressing     Problem: SKIN/TISSUE INTEGRITY - ADULT  Goal: Skin Integrity remains intact(Skin Breakdown Prevention)  Description: Assess:  -Perform Jt assessment every shift  -Clean and moisturize skin every shift  -Inspect skin when repositioning, toileting, and assisting with ADLS  -Assess under medical devices such as nasal Cannula every shift  -Assess extremities for adequate circulation and sensation     Bed Management:  -Have minimal linens on bed & keep smooth, unwrinkled  -Change linens as needed when moist or perspiring  -Avoid sitting or lying in one position for more than 2 hours while in bed  -Keep HOB at 30 degrees     Toileting:  -Offer bedside commode  -Assess for incontinence every shift  -Use incontinent care products after each incontinent episode such as moisture barrier    Activity:  -Mobilize patient 3 times a day  -Encourage activity and walks on unit  -Encourage or provide ROM exercises   -Turn and reposition patient every 2 Hours  -Use appropriate equipment to lift or move patient in bed  -Instruct/ Assist with weight shifting every 15 minutes when out of bed in chair  -Consider limitation of chair time 4 hour intervals    Skin Care:  -Avoid use of baby powder, tape, friction and shearing, hot water or constrictive clothing  -Relieve pressure over bony prominences using wedge  -Do not massage red bony areas    Next Steps:  -Teach patient strategies to minimize risks such as pneumonia  -Consider consults to  interdisciplinary teams such as wound care  Outcome: Progressing     Problem: HEMATOLOGIC - ADULT  Goal: Maintains hematologic stability  Description: INTERVENTIONS  - Assess for signs and symptoms of bleeding or hemorrhage  - Monitor labs  - Administer supportive blood products/factors as ordered and appropriate  Outcome: Progressing     Problem: MUSCULOSKELETAL - ADULT  Goal: Maintain or return mobility to safest level of function  Description: INTERVENTIONS:  - Assess patient's ability to carry out ADLs; assess patient's baseline for ADL function and identify physical deficits which impact ability to perform ADLs (bathing, care of mouth/teeth, toileting, grooming, dressing, etc.)  - Assess/evaluate cause of self-care deficits   - Assess range of motion  - Assess patient's mobility  - Assess patient's need for assistive devices and provide as appropriate  - Encourage maximum independence but intervene and supervise when necessary  - Involve family in performance of ADLs  - Assess for home care needs following discharge   - Consider OT consult to assist with ADL evaluation and planning for discharge  - Provide patient education as appropriate  Outcome: Progressing     Problem: Prexisting or High Potential for Compromised Skin Integrity  Goal: Skin integrity is maintained or improved  Description: INTERVENTIONS:  - Identify patients at risk for skin breakdown  - Assess and monitor skin integrity  - Assess and monitor nutrition and hydration status  - Monitor labs   - Assess for incontinence   - Turn and reposition patient  - Assist with mobility/ambulation  - Relieve pressure over bony prominences  - Avoid friction and shearing  - Provide appropriate hygiene as needed including keeping skin clean and dry  - Evaluate need for skin moisturizer/barrier cream  - Collaborate with interdisciplinary team   - Patient/family teaching  - Consider wound care consult   Outcome: Progressing     Problem: Potential for Falls  Goal: Patient will remain free of falls  Description: INTERVENTIONS:  - Educate patient/family on patient safety including physical limitations  - Instruct patient to call for assistance with activity   - Consult OT/PT to assist with strengthening/mobility   - Keep Call bell within reach  - Keep bed low and locked with side rails adjusted as appropriate  - Keep care items and personal belongings within reach  - Initiate and maintain comfort rounds  - Make Fall Risk Sign visible to staff  - Offer Toileting every 2 Hours, in advance of need  - Initiate/Maintain bed/ chair alarm  - Obtain necessary fall risk management equipment: n/a  - Apply yellow socks and bracelet for high fall risk patients  - Consider moving patient to room near nurses station  Outcome: Progressing     Problem: MOBILITY - ADULT  Goal: Maintain or return to baseline ADL function  Description: INTERVENTIONS:  -  Assess patient's ability to carry out ADLs; assess patient's baseline for ADL function and identify physical deficits which impact ability to perform ADLs (bathing, care of mouth/teeth, toileting, grooming, dressing, etc.)  - Assess/evaluate cause of self-care deficits   - Assess range of motion  - Assess patient's mobility; develop plan if impaired  - Assess patient's need for assistive devices and provide as appropriate  - Encourage maximum independence but intervene and supervise when necessary  - Involve family in performance of ADLs  - Assess for home care needs following discharge   - Consider OT consult to assist with ADL evaluation and planning for discharge  - Provide patient education as appropriate  Outcome: Progressing  Goal: Maintains/Returns to pre admission functional level  Description: INTERVENTIONS:  - Perform BMAT or MOVE assessment daily.   - Set and communicate daily mobility goal to care team and patient/family/caregiver. - Collaborate with rehabilitation services on mobility goals if consulted  - Perform Range of Motion 3 times a day. - Reposition patient every 2 hours.   - Dangle patient 3 times a day  - Stand patient 3 times a day  - Ambulate patient 3 times a day  - Out of bed to chair 3 times a day   - Out of bed for meals 3 times a day  - Out of bed for toileting  - Record patient progress and toleration of activity level   Outcome: Progressing

## 2023-08-18 NOTE — UTILIZATION REVIEW
Continued Stay Review    Date: 8/18/23                          Current Patient Class: inpatient  Current Level of Care: med surg    HPI:62 y.o. female initially admitted on 8/11/23 as IP due to acute on chronic resp failure w/hypoxia    Assessment/Plan: cardio consulted on 8/16 due to increased high o2 requirements and pitting edema. Determined to be in acute on chronic CHF exacerbation requiring  Ongoing IV diuresis. Cardio and pulm following. As of today, weight down 6 lbs from yesterday, diuresing well, net-18. 9liters, 5 liters out yesterday. Will continue k supplments and IV diuresis unless creat starts to rise. anticipate IV diuretics through the next several days. O2 weaned to 4li this am, completed steroids. Pursed lip breathing evident, uses bipap HS. Lungs w/o rales/rhonchi, abd soft nontender, tr pedal edema.      Intake/Output Summary (Last 24 hours) at 8/18/2023 1120  Last data filed at 8/18/2023 0830      Gross per 24 hour   Intake 1140 ml   Output 3800 ml   Net -2660 ml     Weight (last 2 days)     Date/Time Weight    08/18/23 0600 129 (284.83)    08/18/23 0300 129 (284.83)    08/17/23 0558 132 (290.2)    08/16/23 0514 134 (294.4)            Vital Signs:   Date/Time Temp Pulse Resp BP MAP (mmHg) SpO2 FiO2 (%) Calculated FIO2 (%) - Nasal Cannula Nasal Cannula O2 Flow Rate (L/min) O2 Device O2 Interface Device Patient Position - Orthostatic VS   08/18/23 08:23:19 98.2 °F (36.8 °C) 100 -- 104/55 71 90 % -- 36 4 L/min Nasal cannula -- --   08/18/23 0717 -- -- -- -- -- 94 % -- 40 5 L/min Nasal cannula -- --   08/18/23 05:06:18 -- 74 -- 116/72 87 95 % -- -- -- -- -- --   08/18/23 04:55:52 -- 76 -- 116/72 87 90 % -- -- -- -- -- --   08/18/23 0420 -- -- -- -- -- 92 % -- 40 5 L/min BiPAP Face mask --   08/17/23 2328 -- -- -- -- -- -- -- 40 5 L/min BiPAP Face mask --   08/17/23 2041 -- -- -- -- -- 92 % -- 40 5 L/min BiPAP Face mask --   08/17/23 20:05:19 98.3 °F (36.8 °C) 69 -- 109/77 88 99 % -- -- -- -- -- -- 08/17/23 1944 -- -- -- -- -- -- -- 40 5 L/min Nasal cannula -- --   08/17/23 14:48:59 98.7 °F (37.1 °C) 70 18 106/78 87 94 % -- -- -- -- -- --   08/17/23 12:26:31 -- 71 -- 102/47 Abnormal  65 99 % -- -- -- -- -- --   08/17/23 0900 -- -- -- -- -- 99 % -- -- -- BiPAP -- --   08/17/23 08:04:39 -- 102 -- 114/71 85 89 % Abnormal  -- 40 5 L/min Nasal cannula -- --   08/17/23 0804 -- 103 -- 114/71 -- -- -- -- -- -- -- --   08/17/23 0710 -- -- -- -- -- 97 % -- -- -- -- -- --   08/17/23 05:53:37 -- 66 -- 102/51 68 99 % -- -- -- -- -- --   08/17/23 0447 -- -- -- -- -- -- 40 -- -- BiPAP Face mask --   08/17/23 0022 -- -- -- -- -- -- 40 -- -- BiPAP Face mask --   08/16/23 2150 -- -- -- -- -- -- 40 -- -- BiPAP Face mask --   08/16/23 20:52:44 98.4 °F (36.9 °C) 75 -- 101/48 Abnormal  66 97 % -- -- -- -- -- --         Pertinent Labs/Diagnostic Results:   Results from last 7 days   Lab Units 08/11/23  1704   SARS-COV-2  Negative     Results from last 7 days   Lab Units 08/18/23  0451 08/17/23  0549 08/16/23  0508 08/15/23  0446 08/14/23  0443   WBC Thousand/uL 16.84* 18.97* 14.16* 13.87* 13.88*   HEMOGLOBIN g/dL 9.4* 9.1* 8.4* 8.7* 8.4*   HEMATOCRIT % 33.2* 31.9* 29.5* 31.3* 29.4*   PLATELETS Thousands/uL 353 361 311 322 304   NEUTROS ABS Thousands/µL 11.87* 13.91* 9.61* 9.65* 12.09*         Results from last 7 days   Lab Units 08/18/23  0451 08/17/23  0549 08/16/23  0508 08/15/23  0447 08/14/23  0443 08/13/23  0513 08/12/23  1806 08/12/23  0545 08/11/23  1746   SODIUM mmol/L 139 137 139 138 136 135   < > 134*  --    POTASSIUM mmol/L 3.7 3.4* 3.4* 3.3* 3.7 3.6   < > 4.2  --    CHLORIDE mmol/L 94* 91* 93* 93* 92* 91*   < > 91*  --    CO2 mmol/L 38* 40* 40* 41* 38* 35*   < > 35*  --    CO2, I-STAT mmol/L  --   --   --   --   --   --   --   --  44*   ANION GAP mmol/L 7 6 6 4 6 9   < > 8  --    BUN mg/dL 12 13 12 16 22 20   < > 13  --    CREATININE mg/dL 0.54* 0.53* 0.52* 0.54* 0.57* 0.62   < > 0.58*  --    EGFR ml/min/1.73sq m 100 100 101 100 98 95   < > 97  --    CALCIUM mg/dL 8.1* 7.8* 7.4* 7.4* 7.8* 8.6   < > 8.2*  --    CALCIUM, IONIZED, ISTAT mmol/L  --   --   --   --   --   --   --   --  1.11*   MAGNESIUM mg/dL 2.4  --   --  2.4  --  2.4  --  2.4  --    PHOSPHORUS mg/dL  --   --   --   --   --   --   --  4.0  --     < > = values in this interval not displayed.      Results from last 7 days   Lab Units 08/13/23  0513 08/11/23  1704   AST U/L 8* 28   ALT U/L 14 18   ALK PHOS U/L 99 119*   TOTAL PROTEIN g/dL 6.2* 6.5   ALBUMIN g/dL 3.9 3.8   TOTAL BILIRUBIN mg/dL 0.32 0.34     Results from last 7 days   Lab Units 08/18/23  1122 08/18/23  0727 08/17/23 2003 08/17/23  1629 08/17/23  1108 08/17/23  0733 08/16/23  2049 08/16/23  1606 08/16/23  1202 08/16/23  0719 08/15/23  2039 08/15/23  1606   POC GLUCOSE mg/dl 224* 186* 139 214* 204* 181* 225* 341* 269* 202* 243* 315*     Results from last 7 days   Lab Units 08/18/23  0451 08/17/23  0549 08/16/23  0508 08/15/23  0447 08/14/23  0443 08/13/23  0513 08/12/23  1806 08/12/23  1617 08/12/23  0545 08/11/23  1704   GLUCOSE RANDOM mg/dL 109 165* 181* 171* 269* 196* 574* 519* 403* 269*             BETA-HYDROXYBUTYRATE   Date Value Ref Range Status   06/03/2023 0.2 <0.6 mmol/L Final      Results from last 7 days   Lab Units 08/12/23  0545 08/11/23  1920   PH ART  7.369 7.334*   PCO2 ART mm Hg 62.1* 64.8*   PO2 ART mm Hg 86.1 65.3*   HCO3 ART mmol/L 35.0* 33.7*   BASE EXC ART mmol/L 8.3 6.4   O2 CONTENT ART mL/dL 12.3* 12.4*   O2 HGB, ARTERIAL % 93.4* 88.5*   ABG SOURCE  Radial, Left Radial, Left     Results from last 7 days   Lab Units 08/17/23  0549 08/15/23  0446   PH DARVIN  7.490* 7.527*   PCO2 DARVIN mm Hg 46.5 44.8   PO2 DARVIN mm Hg 117.2* 101.6*   HCO3 DARVIN mmol/L 34.6* 36.3*   BASE EXC DARVIN mmol/L 10.2 12.3   O2 CONTENT DARVIN ml/dL 13.8 13.1   O2 HGB, VENOUS % 94.5* 92.7*     Results from last 7 days   Lab Units 08/11/23  1746   PH, DARVIN I-STAT  7.337   PCO2, DARVIN ISTAT mm HG 78.4*   PO2, DARVIN ISTAT mm HG 53.0*   HCO3, DARVIN ISTAT mmol/L 42.0*   I STAT BASE EXC mmol/L 13*   I STAT O2 SAT % 83         Results from last 7 days   Lab Units 08/11/23  2220 08/11/23  1950 08/11/23  1704   HS TNI 0HR ng/L  --   --  7   HS TNI 2HR ng/L  --  6  --    HSTNI D2 ng/L  --  -1  --    HS TNI 4HR ng/L 4  --   --    HSTNI D4 ng/L -3  --   --          Results from last 7 days   Lab Units 08/11/23  1704   PROTIME seconds 15.4*   INR  1.14   PTT seconds 31         Results from last 7 days   Lab Units 08/13/23  0513 08/12/23  0545 08/11/23  1704   PROCALCITONIN ng/ml 0.06 0.05 0.05     Results from last 7 days   Lab Units 08/12/23  0111 08/11/23  2217 08/11/23  1950 08/11/23  1704   LACTIC ACID mmol/L 2.6* 3.0* 2.6* 2.4*             Results from last 7 days   Lab Units 08/11/23  1704   BNP pg/mL 73     Results from last 7 days   Lab Units 08/12/23  0545   FERRITIN ng/mL 7*   IRON SATURATION % 3*   IRON ug/dL 19*   TIBC ug/dL 558*       Results from last 7 days   Lab Units 08/12/23  0113   CLARITY UA  Clear   COLOR UA  Light Yellow   SPEC GRAV UA  <1.005*   PH UA  5.0   GLUCOSE UA mg/dl 1000 (1%)*   KETONES UA mg/dl Negative   BLOOD UA  Negative   PROTEIN UA mg/dl Negative   NITRITE UA  Negative   BILIRUBIN UA  Negative   UROBILINOGEN UA (BE) mg/dl <2.0   LEUKOCYTES UA  Negative     Results from last 7 days   Lab Units 08/11/23  1704   INFLUENZA A PCR  Negative   INFLUENZA B PCR  Negative   RSV PCR  Negative       Results from last 7 days   Lab Units 08/11/23  1741 08/11/23  1704   BLOOD CULTURE  No Growth After 5 Days. No Growth After 5 Days.    Medications:   Scheduled Medications:  apixaban, 5 mg, Oral, BID  atorvastatin, 40 mg, Oral, Daily  budesonide, 0.5 mg, Nebulization, Q12H  escitalopram, 20 mg, Oral, Daily  fluticasone, 1 spray, Nasal, BID  formoterol, 20 mcg, Nebulization, Q12H  furosemide, 80 mg, Intravenous, TID (diuretic)  insulin glargine, 50 Units, Subcutaneous, HS  insulin lispro, 20 Units, Subcutaneous, TID With Meals  insulin lispro, 4-20 Units, Subcutaneous, TID AC  ipratropium, 0.5 mg, Nebulization, TID  levalbuterol, 1.25 mg, Nebulization, TID  loratadine, 10 mg, Oral, Daily  LORazepam, 1 mg, Oral, HS  metoprolol succinate, 100 mg, Oral, Daily  montelukast, 10 mg, Oral, HS  nystatin, , Topical, BID  pantoprazole, 40 mg, Oral, Early Morning  potassium chloride, 40 mEq, Oral, BID  traZODone, 150 mg, Oral, HS      Continuous IV Infusions:none     PRN Meds:   acetaminophen, 650 mg, Oral, Q6H PRN using daily doses  diphenhydrAMINE, 25 mg, Intravenous, Q6H PRN last dose on 8/16      Discharge Plan: [facility]    Network Utilization Review Department  ATTENTION: Please call with any questions or concerns to [phone] and carefully listen to the prompts so that you are directed to the right person. All voicemails are confidential.  Vero Arceo all requests for admission clinical reviews, approved or denied determinations and any other requests to dedicated fax number below belonging to the campus where the patient is receiving treatment.  List of dedicated fax numbers for the Facilities:  [place] DENIALS (Administrative/Medical Necessity) [phone] [address] (Maternity/NICU/Pediatrics) [phone]   [address] [place] [address] Drive [phone]   [facility] [address] [facility] [phone] [address] [phone]   [id number] [facility] [address] [phone]

## 2023-08-18 NOTE — PROGRESS NOTES
[facility]  Progress Note  Name: Siobhan Fischer  MRN: [id number]  Unit/Bed#: -01 I Date of Admission: 8/11/2023   Date of Service: 8/18/2023 I Hospital Day: 7    Assessment/Plan   Class 3 severe obesity in adult [facility])  Assessment & Plan  • Encourage weight loss and lifestyle changes    Lactic acidosis  Assessment & Plan  • LA 2.4->2.6  • Trend       Acute on chronic diastolic congestive heart failure (HCC)  Assessment & Plan  Wt Readings from Last 3 Encounters:   08/18/23 129 kg (284 lb 13.4 oz)   07/26/23 132 kg (291 lb 9.6 oz)   06/08/23 129 kg (284 lb)     • Patient is currently 14 pounds over her most recent outpatient weight  • Chest x-ray shows pulmonary vascular congestion  • Pitting edema present; lung field auscultation difficult given body habitus  • BNP 73  • Received 60mg IV lasix in ED  • On admission, was placed back on torsemide 60 mg twice daily p.o.  • ins and outs, daily weights     Plan  • Low-sodium diabetic diet  • Switch to Lasix 80 mg IV every 8 hours on 8/15  • Strict I/O  • Daily weights  • Continue diuresis with Lasix as per cardiology  · Trend BMP    Paroxysmal atrial fibrillation ([address])  Assessment & Plan  • Home regimen: Eliquis, metoprolol  • Currently SR in hospital     Plan  • Continue Eliquis and metoprol    Acute on chronic respiratory failure with hypoxia (HCC)  Assessment & Plan  • Chronically 4L oxygen NC  • In ED placed on BIPAP then weaned to 12L [address] after receiving MERCER neb, 125mg IV solumedrol and 2g IV magnesium  • ABG on 6L oxygen: pH 7.3, pCO2 64.8, pO2 65.3, HCO3 33.7  • Received IV azithromycin and ceftriaxone in ED     Plan  • Consult Pulmonology-appreciate recommendations, will add IV iron x 3 days  • Respiratory protocol  • Wean oxygen to baseline as able  • Patient is currently 14 pounds over recent outpatient weight, 1+ pitting edema bilateral lower extremities.     • Wean oxygen as tolerated    Type 2 diabetes mellitus with stage 2 chronic kidney disease, with long-term current use of insulin [facility])  Assessment & Plan  Lab Results   Component Value Date    HGBA1C 9.9 (H) 05/11/2023       Recent Labs     08/17/23  1108 08/17/23  1629 08/17/23 2003 08/18/23  0727   POCGLU 204* 214* 139 186*       Blood Sugar Average: Last 72 hrs:  (P) 228.2   • Home regimen: Metformin, Glargine 30 units daily at bedtime, glulisine 24 units with breakfast, 12 units with lunch and dinner     SSI; Subcutaneous Insulin Order Set  Blood Glucose checks TIDWM and QHS (Q6H for NPO patients)  Hold oral medications  Blood Glucose goal while inpatient is 140-180  Reduce basal insulin by 25-50% while inpatient  Consistent Carbohydrate Diet  -- Blood glucose levels highly elevated; likely secondary to high-dose steroids. Was on insulin drip  -- Was tapered off insulin drip. Continue on Lantus insulin and Humalog with meals and sliding scale  -Patient completed the course of prednisone. Will continue to monitor blood sugars closely to adjust Lantus insulin    Obstructive sleep apnea  Assessment & Plan  • Discharged 7/26 with Rx for nocturnal BIPAP  • Patient states she is noncompliant with BIPAP due to personal preference at home and continues to wear CPAP      Plan  • Tolerated BIPAP in hospital -->continue BIPAP HS    * Asthma with COPD with exacerbation ([address])  Assessment & Plan  • Home regimen: Albuterol, Bevespi, Budesonide, Benralalizumab, Montelukast, Xopenex  • Presented to ED from home via EMS for SOB since Tuesday. Patient states she has been noncompliant with nebulizer treatments and BIPAP HS at home since being discharged on 7/26/23. She says she has been compliant with inhalers, oral medications and CPAP HS.    • In ED placed on BIPAP then weaned to 12L [address] [address] after receiving MERCER neb, 125mg IV solumedrol and 2g IV magnesium  • ABG on 6L oxygen: pH 7.3, pCO2 64.8, pO2 65.3, HCO3 33.7  • Received IV azithromycin and ceftriaxone in ED  • Afebrile, procal 0.05     Plan  • IV Solumedrol 40mg BID on admission   • Xopenex/Atrovent TID  • Pulmicort BID  • Performist BID   • Procalcitonin x2 is negative  • Will monitor off futher ABX. • Respiratory protocol  • Pulmonology consult appreciated  • Patient completed the course of prednisone. Labs & Imaging: I have personally reviewed pertinent reports. VTE Prophylaxis: in place. Code Status:   Level 1 - Full Code    Patient Centered Rounds: I have performed bedside rounds with nursing staff today. Discussions with Specialists or Other Care Team Provider: Cardiology    Education and Discussions with Family / Patient: Patient declined family update    Current Length of Stay: 7 day(s)    Current Patient Status: Inpatient   Certification Statement: The patient will continue to require additional inpatient hospital stay due to see my assessment and plan. Subjective:   Patient is seen and examined at bedside. Denies any new complaints. States she feels better. Afebrile  All other ROS are negative. Objective:    Vitals: Blood pressure 104/55, pulse 100, temperature 98.2 °F (36.8 °C), resp. rate 18, height 5' 3" (1.6 m), weight 129 kg (284 lb 13.4 oz), SpO2 90 %, not currently breastfeeding. ,Body mass index is 50.46 kg/m². SPO2 RA Rest    Flowsheet Row ED to Hosp-Admission (Current) from 8/11/2023 in [facility] Med [facility]   SpO2 90 %   SpO2 Activity At Rest   O2 Device Nasal cannula   O2 Flow Rate --        I&O:     Intake/Output Summary (Last 24 hours) at 8/18/2023 1120  Last data filed at 8/18/2023 0830  Gross per 24 hour   Intake 1140 ml   Output 3800 ml   Net -2660 ml       Physical Exam:    General- Alert, sitting comfortably in chair. Not in any acute distress. Neck- Supple, No JVD  CVS- regular, S1 and S2 normal  Chest- Bilateral Air entry, No rhochi, crackles or wheezing present.   Abdomen- soft, nontender, not distended, no guarding or rigidity, BS+  Extremities-  trace pedal edema, No calf tenderness                         Normal ROM in all extremities. CNS-   Alert, awake and orientedx3. No focal deficits present.     Invasive Devices     Peripheral Intravenous Line  Duration           Peripheral IV 08/18/23 Left;Proximal;Ventral (anterior) Forearm <1 day                      Social History  reviewed  Family History   Problem Relation Age of Onset   • Alzheimer's disease Mother    • Atrial fibrillation Mother    • Dementia Mother    • Heart disease Mother         heart problem   • Seizures Mother    • Parkinsonism Father    • Arthritis Father    • Atrial fibrillation Father    • No Known Problems Daughter    • Cri-du-chat syndrome Daughter    • Colon cancer Maternal Grandmother 80   • Diabetes type II Maternal Grandmother    • No Known Problems Brother    • No Known Problems Son    • Substance Abuse Neg Hx         mother,father   • Mental illness Neg Hx         mother,father   • Colon polyps Neg Hx     reviewed    Meds:  Current Facility-Administered Medications   Medication Dose Route Frequency Provider Last Rate Last Admin   • acetaminophen (TYLENOL) tablet 650 mg  650 mg Oral Q6H PRN Chance Wen PA-C   650 mg at 08/17/23 2155   • apixaban (ELIQUIS) tablet 5 mg  5 mg Oral BID Chance Wen, PA-C   5 mg at 08/18/23 7705   • atorvastatin (LIPITOR) tablet 40 mg  40 mg Oral Daily Chance Wen PA-C   40 mg at 08/18/23 7777   • budesonide (PULMICORT) inhalation solution 0.5 mg  0.5 mg Nebulization Q12H MARIELA Perkins-C   0.5 mg at 08/18/23 0715   • diphenhydrAMINE (BENADRYL) injection 25 mg  25 mg Intravenous Q6H PRN Genesis Singer MD   25 mg at 08/16/23 1420   • escitalopram (LEXAPRO) tablet 20 mg  20 mg Oral Daily Chance Wen PA-C   20 mg at 08/18/23 7955   • fluticasone (FLONASE) 50 mcg/act nasal spray 1 spray  1 spray Nasal BID Chance Wen PA-C   1 spray at 08/18/23 9922   • formoterol (PERFOROMIST) nebulizer solution 20 mcg  20 mcg Nebulization Q12H COURTNEY LugoC   20 mcg at 08/18/23 0715   • furosemide (LASIX) injection 80 mg  80 mg Intravenous TID (diuretic) Sabi Harrell PA-C   80 mg at 08/18/23 0501   • insulin glargine (LANTUS) subcutaneous injection 50 Units 0.5 mL  50 Units Subcutaneous HS Gloria Ayon MD   50 Units at 08/17/23 2151   • insulin lispro (HumaLOG) 100 units/mL subcutaneous injection 20 Units  20 Units Subcutaneous TID With Meals Gloria Ayon MD   20 Units at 08/18/23 9160   • insulin lispro (HumaLOG) 100 units/mL subcutaneous injection 4-20 Units  4-20 Units Subcutaneous TID [facility] Gloria Ayon MD   4 Units at 08/18/23 2085   • ipratropium (ATROVENT) 0.02 % inhalation solution 0.5 mg  0.5 mg Nebulization TID LotusCOURTNEY ThakkarC   0.5 mg at 08/18/23 0715   • levalbuterol (Gloria ) inhalation solution 1.25 mg  1.25 mg Nebulization TID Lotus Steiner, PA-C   1.25 mg at 08/18/23 0715   • loratadine (CLARITIN) tablet 10 mg  10 mg Oral Daily Lotus Jatin, PA-C   10 mg at 08/18/23 7490   • LORazepam (ATIVAN) tablet 1 mg  1 mg Oral HS Lotus Steiner, PA-C   1 mg at 08/17/23 2151   • metoprolol succinate (TOPROL-XL) 24 hr tablet 100 mg  100 mg Oral Daily Lotus Steiner, PA-C   100 mg at 08/17/23 0804   • montelukast (SINGULAIR) tablet 10 mg  10 mg Oral HS Lotus Steiner, PA-C   10 mg at 08/17/23 2151   • nystatin (MYCOSTATIN) powder   Topical BID Deedee Venegas MD   Given at 08/18/23 1565   • pantoprazole (PROTONIX) EC tablet 40 mg  40 mg Oral Early Morning COURTNEY LugoC   40 mg at 08/18/23 0501   • potassium chloride (K-DUR,KLOR-CON) CR tablet 40 mEq  40 mEq Oral BID Sabi Harrell PA-C   40 mEq at 08/18/23 4213   • traZODone (DESYREL) tablet 150 mg  150 mg Oral HS Lotus Steiner PA-C   150 mg at 08/17/23 2151      Medications Prior to Admission   Medication   • apixaban (Eliquis) 5 mg   • atorvastatin (LIPITOR) 40 mg tablet   • escitalopram (LEXAPRO) 20 mg tablet   • fluticasone (FLONASE) 50 mcg/act nasal spray   • insulin glulisine (Apidra SoloStar) 100 units/mL injection pen   • loratadine (CLARITIN) 10 mg tablet   • LORazepam (ATIVAN) 0.5 mg tablet   • metFORMIN (GLUCOPHAGE-XR) 500 mg 24 hr tablet   • metoprolol succinate (TOPROL-XL) 100 mg 24 hr tablet   • montelukast (SINGULAIR) 10 mg tablet   • omeprazole (PriLOSEC) 40 MG capsule   • potassium chloride (Klor-Con M20) 20 mEq tablet   • torsemide (DEMADEX) 20 mg tablet   • traZODone (DESYREL) 150 mg tablet   • albuterol (2.5 mg/3 mL) 0.083 % nebulizer solution   • albuterol (PROVENTIL HFA,VENTOLIN HFA) 90 mcg/act inhaler   • Benralizumab 30 MG/ML SOAJ   • Blood Glucose Monitoring Suppl (Ablynx CONTOUR NEXT MONITOR) w/Device KIT   • Contour Next Test test strip   • CVS Lancets Thin 26G MISC   • EPINEPHrine (EPIPEN) 0.3 mg/0.3 mL SOAJ   • ferrous sulfate 220 (44 Fe) mg/5 mL solution   • glycopyrrolate-formoterol (BEVESPI AEROSPHERE) 9-4.8 MCG/ACT inhaler   • Insulin Pen Needle (BD Pen Needle Love 2nd Gen) 32G X 4 MM MISC   • levalbuterol (XOPENEX) 1.25 mg/0.5 mL nebulizer solution   • naloxone (NARCAN) 4 mg/0.1 mL nasal spray   • semaglutide, 0.25 or 0.5 mg/dose, (Ozempic, 0.25 or 0.5 MG/DOSE,) 2 mg/3 mL injection pen       Labs:  Results from last 7 days   Lab Units 08/18/23  0451 08/17/23  0549 08/16/23  0508   WBC Thousand/uL 16.84* 18.97* 14.16*   HEMOGLOBIN g/dL 9.4* 9.1* 8.4*   HEMATOCRIT % 33.2* 31.9* 29.5*   PLATELETS Thousands/uL 353 361 311   NEUTROS PCT % 70 73 68   LYMPHS PCT % 18 17 21   MONOS PCT % 7 6 8   EOS PCT % 3 2 2     Results from last 7 days   Lab Units 08/18/23  0451 08/17/23  0549 08/16/23  0508 08/14/23  0443 08/13/23  0513 08/12/23  0545 08/11/23  1746 08/11/23  1704   POTASSIUM mmol/L 3.7 3.4* 3.4*   < > 3.6   < >  --  4.9   CHLORIDE mmol/L 94* 91* 93*   < > 91*   < >  --  91*   CO2 mmol/L 38* 40* 40*   < > 35*   < >  --  39*   CO2, I-STAT mmol/L  --   --   --   --   --   --  44*  --    BUN mg/dL 12 13 12   < > 20   < >  --  11   CREATININE mg/dL 0.54* 0.53* 0.52*   < > 0.62   < >  --  0.59*   CALCIUM mg/dL 8.1* 7.8* 7.4*   < > 8.6   < >  --  8.0*   ALK PHOS U/L  --   --   --   --  99  --   --  119*   ALT U/L  --   --   --   --  14  --   --  18   AST U/L  --   --   --   --  8*  --   --  28   GLUCOSE, ISTAT mg/dl  --   --   --   --   --   --  252*  --     < > = values in this interval not displayed. Lab Results   Component Value Date    TROPONINI <0.02 06/03/2021    TROPONINI <0.02 06/03/2021    TROPONINI <0.02 06/03/2021    CKTOTAL 39 09/07/2017     Results from last 7 days   Lab Units 08/11/23  1704   INR  1.14     Lab Results   Component Value Date    BLOODCX No Growth After 5 Days. 08/11/2023    BLOODCX No Growth After 5 Days. 08/11/2023    BLOODCX No Growth After 5 Days. 06/03/2023    SPUTUMCULTUR 2+ Growth of 05/21/2020    SPUTUMCULTUR  05/21/2020     Commensal respiratory nicholas only; No significant growth of Staph aureus/MRSA or Pseudomonas aeruginosa. Imaging:  Results for orders placed during the hospital encounter of 08/11/23    XR chest portable    Narrative  CHEST    INDICATION:   sob. COMPARISON: 8/14/2023    EXAM PERFORMED/VIEWS:  XR CHEST PORTABLE      FINDINGS:    Heart is top normal in size. Pulmonary vascular congestion and interstitial prominence is improved. No pneumothorax or pleural effusion. Osseous structures appear within normal limits for patient age. Impression  Slight improvement of pulmonary vascular congestion. Workstation performed: [id number]    Results for orders placed during the hospital encounter of 09/27/22    XR chest pa & lateral    Narrative  CHEST    INDICATION:   J44.9: Chronic obstructive pulmonary disease, unspecified  R06.02: Shortness of breath. COMPARISON:  6/5/2021    EXAM PERFORMED/VIEWS:  XR CHEST PA & LATERAL      FINDINGS:    Cardiomediastinal silhouette appears unremarkable. Mild arch calcifications again noted.     Mild emphysematous changes are seen. Bilateral lower lobe hazy pulmonary infiltrates and/or subsegmental atelectasis noted. Upper to midlung fields appear adequately aerated and clear. No pneumothorax or pleural effusion. Osseous structures appear within normal limits for patient age. No free air in the upper abdomen. Impression  Mild emphysematous changes are seen. Bilateral lower lobe hazy pulmonary infiltrates and/or subsegmental atelectasis noted. Upper to midlung fields appear adequately aerated and clear.           Workstation performed: [id number]      Last 24 Hours Medication List:   Current Facility-Administered Medications   Medication Dose Route Frequency Provider Last Rate   • acetaminophen  650 mg Oral Q6H PRN [employer], ROSALIO     • apixaban  5 mg Oral BID [employer], ROSALIO     • atorvastatin  40 mg Oral Daily [employer], ROSALIO     • budesonide  0.5 mg Nebulization Q12H [employer]andsROSALIO     • diphenhydrAMINE  25 mg Intravenous Q6H PRN Kristen Koehler MD     • escitalopram  20 mg Oral Daily [employer], ROSALIO     • fluticasone  1 spray Nasal BID [employer], ROSALIO     • formoterol  20 mcg Nebulization Q12H [employer], ROSALIO     • furosemide  80 mg Intravenous TID (diuretic) Greig Baumgarten [place], PA-C     • insulin glargine  50 Units Subcutaneous HS Aarti Dillard MD     • insulin lispro  20 Units Subcutaneous TID With Meals Aarti Dillard MD     • insulin lispro  4-20 Units Subcutaneous TID AC Aarti Dillard MD     • ipratropium  0.5 mg Nebulization TID [employer], ROSALIO     • levalbuterol  1.25 mg Nebulization TID [employer], ROSALIO     • loratadine  10 mg Oral Daily [employer], ROSALIO     • LORazepam  1 mg Oral HS [employer] BrandsROSALIO     • metoprolol succinate  100 mg Oral Daily [employer], ROSALIO     • montelukast  10 mg Oral HS [employer], ROSALIO     • nystatin   Topical BID Kristen Koehler MD     • pantoprazole  40 mg Oral Early Morning Rodríguez Spears PA-C     • potassium chloride  40 mEq Oral BID Lonnie Harrell PA-C     • traZODone  150 mg Oral HS Rodríguez Spears PA-C          Today, Patient Was Seen By: Skip Shabazz MD    ** Please Note: Dictation voice to text software may have been used in the creation of this document.  **

## 2023-08-18 NOTE — PROGRESS NOTES
Pulmonary Progress Note  Heavenly Kaur 57 y.o. female MRN: [id number]  Unit/Bed#: -01 Encounter: [id number]      Assessment/Plan:  Acute on chronic hypoxic and hypercapnic respiratory failure   Severe COPD with acute exacerbation  Diastolic heart failure, pulmonary hypertension with exacerbation  WILMA on AVAPS  Iron deficiency anemia  Atrial fibrillation on Eliquis  Morbid obesity     • Multifactorial etiology of hypoxia - COPD, hypoventilation, cardiogenic pulmonary edema - I think closer to her baseline  • I weaned her O2 to 4L in the room this morning  • Repeat echo shows increased in PASP to 61mmHg from 48mHg prior  • C/w BiPAP qhs - patient uses AVAPS at home  • wean O2 as able.  Baseline O2 requirement 4LPM at baseline  • Completed steroid course  • c/w Xopenex/Atrovent TID  • Diuresis regimen per cardiology/primary team  • I discussed with our specialty medication coordinator - patient will be set up to receive Richmond Moreau as an outpatient in our infusion centers  • Outpatient asthma/COPD regimen - on Bevespi, nebulized budesonide, nebulized albuterol, MDI albuterol at home.      Subjective:  She feels the same. No dyspnea at rest.  Pursed lip breathing  On 5L. She has no fever/cough/sputum. Compliant with BiPAP when sleeping    Objective:  Vitals: Vitals personally reviewed  Vitals:    08/18/23 0455 08/18/23 0506 08/18/23 0600 08/18/23 0717   BP: 116/72 116/72     Pulse: 76 74     Resp:       Temp:       TempSrc:       SpO2: 90% 95%  94%   Weight:   129 kg (284 lb 13.4 oz)    Height:          Body mass index is 50.46 kg/m². Intake/Output Summary (Last 24 hours) at 8/18/2023 0806  Last data filed at 8/18/2023 0659  Gross per 24 hour   Intake 960 ml   Output 3800 ml   Net -2840 ml     Invasive Devices     Peripheral Intravenous Line  Duration           Peripheral IV 08/14/23 Dorsal (posterior); Right Hand 4 days                Physical Exam  General: Obese, elderly female sitting in chair, in no acute distress  HEET: head is normocephalic, atraumatic.  Eyes EOMI, no scleral icterus  Neck: Supple, with full range of motion, no appreciable JVD, not using accessory muscles  CV:  Regular rhythm, +S1 S2  Lungs: Diminished breath sounds bilaterally. No wheezing  Abdomen: Soft, non-tender, non-distended  Extremities: No clubbing, cyanosis.  2+ pitting edema in lower extremities up to upper shins  Neuro: No focal motor/sensory deficits.  Patient is oriented to self, place, month/year, and situation  Skin: Warm, No rashes or ulcerations appreciable    Labs: I have personally reviewed pertinent lab results. Laboratory and Diagnostics  Results from last 7 days   Lab Units 08/18/23  0451 08/17/23  0549 08/16/23  0508 08/15/23  0446 08/14/23  0443 08/13/23  0513 08/12/23  0545 08/11/23  1746 08/11/23  1704   WBC Thousand/uL 16.84* 18.97* 14.16* 13.87* 13.88* 12.62* 12.68*  --  13.95*   HEMOGLOBIN g/dL 9.4* 9.1* 8.4* 8.7* 8.4* 8.4* 8.7*  --  9.2*   I STAT HEMOGLOBIN   --   --   --   --   --   --   --    < >  --    HEMATOCRIT % 33.2* 31.9* 29.5* 31.3* 29.4* 29.1* 31.1*  --  33.6*   HEMATOCRIT, ISTAT   --   --   --   --   --   --   --    < >  --    PLATELETS Thousands/uL 353 361 311 322 304 273 300  --  350   NEUTROS PCT % 70 73 68 69 87*  --   --   --  77*   MONOS PCT % 7 6 8 9 6  --   --   --  6   MONO PCT %  --   --   --   --   --   --  1*  --   --    EOS PCT % 3 2 2 1 0  --  1  --  2    < > = values in this interval not displayed.      Results from last 7 days   Lab Units 08/18/23  0451 08/17/23  0549 08/16/23  0508 08/15/23  0447 08/14/23  0443 08/13/23  0513 08/12/23  1806 08/11/23  1746 08/11/23  1704   SODIUM mmol/L 139 137 139 138 136 135 128*   < > 134*   POTASSIUM mmol/L 3.7 3.4* 3.4* 3.3* 3.7 3.6 4.4   < > 4.9   CHLORIDE mmol/L 94* 91* 93* 93* 92* 91* 86*   < > 91*   CO2 mmol/L 38* 40* 40* 41* 38* 35* 30   < > 39*   CO2, I-STAT   --   --   --   --   --   --   --    < >  --    ANION GAP mmol/L 7 6 6 4 6 9 12   < > 4 BUN mg/dL 12 13 12 16 22 20 20   < > 11   CREATININE mg/dL 0.54* 0.53* 0.52* 0.54* 0.57* 0.62 0.91   < > 0.59*   CALCIUM mg/dL 8.1* 7.8* 7.4* 7.4* 7.8* 8.6 8.4   < > 8.0*   GLUCOSE RANDOM mg/dL 109 165* 181* 171* 269* 196* 574*   < > 269*   ALT U/L  --   --   --   --   --  14  --   --  18   AST U/L  --   --   --   --   --  8*  --   --  28   ALK PHOS U/L  --   --   --   --   --  99  --   --  119*   ALBUMIN g/dL  --   --   --   --   --  3.9  --   --  3.8   TOTAL BILIRUBIN mg/dL  --   --   --   --   --  0.32  --   --  0.34    < > = values in this interval not displayed. Results from last 7 days   Lab Units 23  0451 08/15/23  0447 23  0513 23  0545   MAGNESIUM mg/dL 2.4 2.4 2.4 2.4   PHOSPHORUS mg/dL  --   --   --  4.0      Results from last 7 days   Lab Units 23  1704   INR  1.14   PTT seconds 31          Results from last 7 days   Lab Units 23  0111 23  2217 23  1950 23  1704   LACTIC ACID mmol/L 2.6* 3.0* 2.6* 2.4*     Results from last 7 days   Lab Units 23  0545   FERRITIN ng/mL 7*                 Results from last 7 days   Lab Units 23  0513 23  0545 23  1704   PROCALCITONIN ng/ml 0.06 0.05 0.05           ABG:   Results from last 7 days   Lab Units 23  0545   PH ART  7.369   PCO2 ART mm Hg 62.1*   PO2 ART mm Hg 86.1   HCO3 ART mmol/L 35.0*   BASE EXC ART mmol/L 8.3   ABG SOURCE  Radial, Left     Venous blood gas 7.5 . Mixed metabolic alkalosis and respiratory alkalosis  7.49/46.5    Imaging and other studies: I have personally reviewed pertinent films in PACS   CXR - pulmonary vascular congestion, worse, +b/l pleural effusions    CXR 23 -no consolidation, effusions, pneumothorax  CT Chest 6/3/23 -scattered areas of hyperlucency likely representing air trapping.  Innumerable small pulmonary nodules appear unchanged from at least     Pulmonary function testin/2022  Results:  FEV1/FVC Ratio: 57 %  Forced Vital Capacity:  1.08L    36 % predicted  FEV1: 0.62 L     26 % predicted  After administration of bronchodilator FEV1: 0.84 (+36%)     Lung volumes by body plethysmography: Total Lung Capacity 102 % predicted Residual volume 175 % predicted  RV/TLC ratio 171%     DLCO corrected for patients hemoglobin level: 54 %     6MWT:  Walked total of 2 mins; distance 109.6m      Echocardiogram:   10/2020•  Left Ventricle: Left ventricular cavity size is normal. Wall thickness is normal. The left ventricular ejection fraction is 60%. Systolic function is normal. Wall motion is normal. Diastolic function is moderately abnormal, consistent with grade II (pseudonormal) relaxation. •  Left Atrium: The atrium is mildly dilated. •  Tricuspid Valve: There is mild regurgitation. The right ventricular systolic pressure is mildly to moderately elevated. The estimated right ventricular systolic pressure is 47.21 mmHg. Code Status: Level 1 - Full Code      [place] MD [place]  Pulmonary, Critical Care and Sleep Medicine  [facility]     Portions of the record may have been created with voice recognition software. Occasional wrong word or "sound a like" substitutions may have occurred due to the inherent limitations of voice recognition software. Please read the chart carefully and recognize, using context, where substitutions have occurred.

## 2023-08-18 NOTE — ASSESSMENT & PLAN NOTE
Lab Results   Component Value Date    HGBA1C 9.9 (H) 05/11/2023       Recent Labs     08/17/23  1108 08/17/23  1629 08/17/23 2003 08/18/23  0727   POCGLU 204* 214* 139 186*       Blood Sugar Average: Last 72 hrs:  (P) 228.2   • Home regimen: Metformin, Glargine 30 units daily at bedtime, glulisine 24 units with breakfast, 12 units with lunch and dinner     SSI; Subcutaneous Insulin Order Set  Blood Glucose checks TIDWM and QHS (Q6H for NPO patients)  Hold oral medications  Blood Glucose goal while inpatient is 140-180  Reduce basal insulin by 25-50% while inpatient  Consistent Carbohydrate Diet  -- Blood glucose levels highly elevated; likely secondary to high-dose steroids. Was on insulin drip  -- Was tapered off insulin drip. Continue on Lantus insulin and Humalog with meals and sliding scale  -Patient completed the course of prednisone.   Will continue to monitor blood sugars closely to adjust Lantus insulin

## 2023-08-18 NOTE — PLAN OF CARE
Problem: PAIN - ADULT  Goal: Verbalizes/displays adequate comfort level or baseline comfort level  Description: Interventions:  - Encourage patient to monitor pain and request assistance  - Assess pain using appropriate pain scale  - Administer analgesics based on type and severity of pain and evaluate response  - Implement non-pharmacological measures as appropriate and evaluate response  - Consider cultural and social influences on pain and pain management  - Notify physician/advanced practitioner if interventions unsuccessful or patient reports new pain  Outcome: Progressing     Problem: INFECTION - ADULT  Goal: Absence or prevention of progression during hospitalization  Description: INTERVENTIONS:  - Assess and monitor for signs and symptoms of infection  - Monitor lab/diagnostic results  - Monitor all insertion sites, i.e. indwelling lines, tubes, and drains  - Monitor endotracheal if appropriate and nasal secretions for changes in amount and color  - [place] appropriate cooling/warming therapies per order  - Administer medications as ordered  - Instruct and encourage patient and family to use good hand hygiene technique  - Identify and instruct in appropriate isolation precautions for identified infection/condition  Outcome: Progressing     Problem: SAFETY ADULT  Goal: Patient will remain free of falls  Description: INTERVENTIONS:  - Educate patient/family on patient safety including physical limitations  - Instruct patient to call for assistance with activity   - Consult OT/PT to assist with strengthening/mobility   - Keep Call bell within reach  - Keep bed low and locked with side rails adjusted as appropriate  - Keep care items and personal belongings within reach  - Initiate and maintain comfort rounds  - Make Fall Risk Sign visible to staff  - Offer Toileting every 2 Hours, in advance of need  - Initiate/Maintain bed/ chair alarm  - Obtain necessary fall risk management equipment: n/a  - Apply yellow socks and bracelet for high fall risk patients  - Consider moving patient to room near nurses station  Outcome: Progressing  Goal: Maintain or return to baseline ADL function  Description: INTERVENTIONS:  -  Assess patient's ability to carry out ADLs; assess patient's baseline for ADL function and identify physical deficits which impact ability to perform ADLs (bathing, care of mouth/teeth, toileting, grooming, dressing, etc.)  - Assess/evaluate cause of self-care deficits   - Assess range of motion  - Assess patient's mobility; develop plan if impaired  - Assess patient's need for assistive devices and provide as appropriate  - Encourage maximum independence but intervene and supervise when necessary  - Involve family in performance of ADLs  - Assess for home care needs following discharge   - Consider OT consult to assist with ADL evaluation and planning for discharge  - Provide patient education as appropriate  Outcome: Progressing  Goal: Maintains/Returns to pre admission functional level  Description: INTERVENTIONS:  - Perform BMAT or MOVE assessment daily.   - Set and communicate daily mobility goal to care team and patient/family/caregiver. - Collaborate with rehabilitation services on mobility goals if consulted  - Perform Range of Motion 3 times a day. - Reposition patient every 2 hours.   - Dangle patient 3 times a day  - Stand patient 3 times a day  - Ambulate patient 3 times a day  - Out of bed to chair 3 times a day   - Out of bed for meals 3 times a day  - Out of bed for toileting  - Record patient progress and toleration of activity level   Outcome: Progressing     Problem: DISCHARGE PLANNING  Goal: Discharge to home or other facility with appropriate resources  Description: INTERVENTIONS:  - Identify barriers to discharge w/patient and caregiver  - Arrange for needed discharge resources and transportation as appropriate  - Identify discharge learning needs (meds, wound care, etc.)  - Arrange for interpretive services to assist at discharge as needed  - Refer to Case Management Department for coordinating discharge planning if the patient needs post-hospital services based on physician/advanced practitioner order or complex needs related to functional status, cognitive ability, or social support system  Outcome: Progressing     Problem: Knowledge Deficit  Goal: Patient/family/caregiver demonstrates understanding of disease process, treatment plan, medications, and discharge instructions  Description: Complete learning assessment and assess knowledge base.   Interventions:  - Provide teaching at level of understanding  - Provide teaching via preferred learning methods  Outcome: Progressing     Problem: RESPIRATORY - ADULT  Goal: Achieves optimal ventilation and oxygenation  Description: INTERVENTIONS:  - Assess for changes in respiratory status  - Assess for changes in mentation and behavior  - Position to facilitate oxygenation and minimize respiratory effort  - Oxygen administered by appropriate delivery if ordered  - Initiate smoking cessation education as indicated  - Encourage broncho-pulmonary hygiene including cough, deep breathe, Incentive Spirometry  - Assess the need for suctioning and aspirate as needed  - Assess and instruct to report SOB or any respiratory difficulty  - Respiratory Therapy support as indicated  Outcome: Progressing     Problem: METABOLIC, FLUID AND ELECTROLYTES - ADULT  Goal: Electrolytes maintained within normal limits  Description: INTERVENTIONS:  - Monitor labs and assess patient for signs and symptoms of electrolyte imbalances  - Administer electrolyte replacement as ordered  - Monitor response to electrolyte replacements, including repeat lab results as appropriate  - Instruct patient on fluid and nutrition as appropriate  Outcome: Progressing  Goal: Fluid balance maintained  Description: INTERVENTIONS:  - Monitor labs   - Monitor I/O and WT  - Instruct patient on fluid and nutrition as appropriate  - Assess for signs & symptoms of volume excess or deficit  Outcome: Progressing  Goal: Glucose maintained within target range  Description: INTERVENTIONS:  - Monitor Blood Glucose as ordered  - Assess for signs and symptoms of hyperglycemia and hypoglycemia  - Administer ordered medications to maintain glucose within target range  - Assess nutritional intake and initiate nutrition service referral as needed  Outcome: Progressing     Problem: CARDIOVASCULAR - ADULT  Goal: Maintains optimal cardiac output and hemodynamic stability  Description: INTERVENTIONS:  - Monitor I/O, vital signs and rhythm  - Monitor for S/S and trends of decreased cardiac output  - Administer and titrate ordered vasoactive medications to optimize hemodynamic stability  - Assess quality of pulses, skin color and temperature  - Assess for signs of decreased coronary artery perfusion  - Instruct patient to report change in severity of symptoms  Outcome: Progressing     Problem: GENITOURINARY - ADULT  Goal: Maintains or returns to baseline urinary function  Description: INTERVENTIONS:  - Assess urinary function  - Encourage oral fluids to ensure adequate hydration if ordered  - Administer IV fluids as ordered to ensure adequate hydration  - Administer ordered medications as needed  - Offer frequent toileting  - Follow urinary retention protocol if ordered  Outcome: Progressing     Problem: SKIN/TISSUE INTEGRITY - ADULT  Goal: Skin Integrity remains intact(Skin Breakdown Prevention)  Description: Assess:  -Perform Jt assessment every shift  -Clean and moisturize skin every shift  -Inspect skin when repositioning, toileting, and assisting with ADLS  -Assess under medical devices such as nasal Cannula every shift  -Assess extremities for adequate circulation and sensation     Bed Management:  -Have minimal linens on bed & keep smooth, unwrinkled  -Change linens as needed when moist or perspiring  -Avoid sitting or lying in one position for more than 2 hours while in bed  -Keep HOB at 30 degrees     Toileting:  -Offer bedside commode  -Assess for incontinence every shift  -Use incontinent care products after each incontinent episode such as moisture barrier    Activity:  -Mobilize patient 3 times a day  -Encourage activity and walks on unit  -Encourage or provide ROM exercises   -Turn and reposition patient every 2 Hours  -Use appropriate equipment to lift or move patient in bed  -Instruct/ Assist with weight shifting every 15 minutes when out of bed in chair  -Consider limitation of chair time 4 hour intervals    Skin Care:  -Avoid use of baby powder, tape, friction and shearing, hot water or constrictive clothing  -Relieve pressure over bony prominences using wedge  -Do not massage red bony areas    Next Steps:  -Teach patient strategies to minimize risks such as pneumonia  -Consider consults to  interdisciplinary teams such as wound care  Outcome: Progressing     Problem: HEMATOLOGIC - ADULT  Goal: Maintains hematologic stability  Description: INTERVENTIONS  - Assess for signs and symptoms of bleeding or hemorrhage  - Monitor labs  - Administer supportive blood products/factors as ordered and appropriate  Outcome: Progressing     Problem: MUSCULOSKELETAL - ADULT  Goal: Maintain or return mobility to safest level of function  Description: INTERVENTIONS:  - Assess patient's ability to carry out ADLs; assess patient's baseline for ADL function and identify physical deficits which impact ability to perform ADLs (bathing, care of mouth/teeth, toileting, grooming, dressing, etc.)  - Assess/evaluate cause of self-care deficits   - Assess range of motion  - Assess patient's mobility  - Assess patient's need for assistive devices and provide as appropriate  - Encourage maximum independence but intervene and supervise when necessary  - Involve family in performance of ADLs  - Assess for home care needs following discharge   - Consider OT consult to assist with ADL evaluation and planning for discharge  - Provide patient education as appropriate  Outcome: Progressing     Problem: Nutrition/Hydration-ADULT  Goal: Nutrient/Hydration intake appropriate for improving, restoring or maintaining nutritional needs  Description: Monitor and assess patient's nutrition/hydration status for malnutrition. Collaborate with interdisciplinary team and initiate plan and interventions as ordered. Monitor patient's weight and dietary intake as ordered or per policy. Utilize nutrition screening tool and intervene as necessary. Determine patient's food preferences and provide high-protein, high-caloric foods as appropriate.      INTERVENTIONS:  - Monitor oral intake, urinary output, labs, and treatment plans  - Assess nutrition and hydration status and recommend course of action  - Evaluate amount of meals eaten  - Assist patient with eating if necessary   - Allow adequate time for meals  - Recommend/ encourage appropriate diets, oral nutritional supplements, and vitamin/mineral supplements  - Order, calculate, and assess calorie counts as needed  - Recommend, monitor, and adjust tube feedings and TPN/PPN based on assessed needs  - Assess need for intravenous fluids  - Provide specific nutrition/hydration education as appropriate  - Include patient/family/caregiver in decisions related to nutrition  Outcome: Progressing     Problem: Prexisting or High Potential for Compromised Skin Integrity  Goal: Skin integrity is maintained or improved  Description: INTERVENTIONS:  - Identify patients at risk for skin breakdown  - Assess and monitor skin integrity  - Assess and monitor nutrition and hydration status  - Monitor labs   - Assess for incontinence   - Turn and reposition patient  - Assist with mobility/ambulation  - Relieve pressure over bony prominences  - Avoid friction and shearing  - Provide appropriate hygiene as needed including keeping skin clean and dry  - Evaluate need for skin moisturizer/barrier cream  - Collaborate with interdisciplinary team   - Patient/family teaching  - Consider wound care consult   Outcome: Progressing     Problem: Potential for Falls  Goal: Patient will remain free of falls  Description: INTERVENTIONS:  - Educate patient/family on patient safety including physical limitations  - Instruct patient to call for assistance with activity   - Consult OT/PT to assist with strengthening/mobility   - Keep Call bell within reach  - Keep bed low and locked with side rails adjusted as appropriate  - Keep care items and personal belongings within reach  - Initiate and maintain comfort rounds  - Make Fall Risk Sign visible to staff  - Offer Toileting every 2 Hours, in advance of need  - Initiate/Maintain bed/ chair alarm  - Obtain necessary fall risk management equipment: n/a  - Apply yellow socks and bracelet for high fall risk patients  - Consider moving patient to room near nurses station  Outcome: Progressing     Problem: MOBILITY - ADULT  Goal: Maintain or return to baseline ADL function  Description: INTERVENTIONS:  -  Assess patient's ability to carry out ADLs; assess patient's baseline for ADL function and identify physical deficits which impact ability to perform ADLs (bathing, care of mouth/teeth, toileting, grooming, dressing, etc.)  - Assess/evaluate cause of self-care deficits   - Assess range of motion  - Assess patient's mobility; develop plan if impaired  - Assess patient's need for assistive devices and provide as appropriate  - Encourage maximum independence but intervene and supervise when necessary  - Involve family in performance of ADLs  - Assess for home care needs following discharge   - Consider OT consult to assist with ADL evaluation and planning for discharge  - Provide patient education as appropriate  Outcome: Progressing  Goal: Maintains/Returns to pre admission functional level  Description: INTERVENTIONS:  - Perform BMAT or MOVE assessment daily.   - Set and communicate daily mobility goal to care team and patient/family/caregiver. - Collaborate with rehabilitation services on mobility goals if consulted  - Perform Range of Motion 3 times a day. - Reposition patient every 2 hours.   - Dangle patient 3 times a day  - Stand patient 3 times a day  - Ambulate patient 3 times a day  - Out of bed to chair 3 times a day   - Out of bed for meals 3 times a day  - Out of bed for toileting  - Record patient progress and toleration of activity level   Outcome: Progressing

## 2023-08-18 NOTE — PROGRESS NOTES
Progress Note - Cardiology   Annika Puri 57 y.o. female MRN: [id number]  Unit/Bed#: -01 Encounter: [id number]        Problem List:  Principal Problem:    Asthma with COPD with exacerbation ([address])  Active Problems:    Obstructive sleep apnea    Type 2 diabetes mellitus with stage 2 chronic kidney disease, with long-term current use of insulin (HCC)    Acute on chronic respiratory failure with hypoxia (HCC)    Paroxysmal atrial fibrillation (HCC)    Acute on chronic diastolic congestive heart failure (HCC)    Lactic acidosis    Class 3 severe obesity in adult [facility])      Assessment:  1. Acute on chronic respiratory failure  a. Baseline oxygen requirement 4 L  b. Etiology of decompensation is congestive heart failure and COPD exacerbation  c. CXR 8/14/2023: Pulmonary vascular congestion worsened since prior exam with new blunting of costophrenic angles concerning for bilateral pleural effusions  d. Pulmonology following and patient has received both IV and oral steroids as well as receiving IV iron supplements for anemia  2. Acute on chronic diastolic heart failure  a. 8/2023 Echo: LVEF 33%, grade 2 diastolic dysfunction, dilated RV with normal RV systolic function, dilated RA, severely elevated RV systolic pressure 61 mmHg  b. 9/2021 RHC: Severely elevated left and right-sided filling pressures, severe postcapillary pulmonary hypertension group 2, normal cardiac output  c. Baseline weight reportedly in the mid 280s  d. OP diuretic torsemide 60 twice daily with 20 twice daily potassium  3. Paroxysmal atrial fibrillation  a. Eliquis 5 twice daily for CVA risk reduction  b. AV blocking Rx: Toprol- daily  4. Type 2 diabetes  5. COPD  6.  Obstructive sleep apnea    Plan/ Discussion:  • Down 6 pounds from yesterday and still with excellent diuresis 5 L yesterday and total net -18.9   • Creatinine is stable at 0.5, potassium is 3.7 sodium is 139  • Continue IV diuretics likely until creatinine starts to increase  • Once euvolemic would restart torsemide can use 80 twice daily  • For now continue potassium 40 twice daily  • Otherwise no changes to medications today  • Anticipate diuresis through the weekend, will see back formally 8/21/23    Subjective:  Feeling ok. Breathing is fine. On Bipap.  Down to baseline oxygen requirements    Vitals:  Vitals:    08/18/23 0300 08/18/23 0600   Weight: 129 kg (284 lb 13.4 oz) 129 kg (284 lb 13.4 oz)   ,  Vitals:    08/18/23 0506 08/18/23 0600 08/18/23 0717 08/18/23 0823   BP: 116/72   104/55   Pulse: 74   100   Resp:       Temp:    98.2 °F (36.8 °C)   TempSrc:       SpO2: 95%  94% 90%   Weight:  129 kg (284 lb 13.4 oz)     Height:           Exam:  General: Alert awake and oriented, no acute distress  Heart:  Regular rate and rhythm, no murmurs, Normal S1, no edema    Respiratory effort/ Lungs:  Breathing comfortably on BiPAP, clear bilaterally without wheezing, rales, crackles   Abdominal: Non-tender to palpation, + bowel sounds, soft, no masses or distension  Lower Limbs:  No edema            Telemetry:           Medications:    Current Facility-Administered Medications:   •  acetaminophen (TYLENOL) tablet 650 mg, 650 mg, Oral, Q6H PRN, Khurram Syed PA-C, 650 mg at 08/17/23 2155  •  apixaban (ELIQUIS) tablet 5 mg, 5 mg, Oral, BID, Khurram Syed PA-C, 5 mg at 08/18/23 8623  •  atorvastatin (LIPITOR) tablet 40 mg, 40 mg, Oral, Daily, MARIELA Wall-C, 40 mg at 08/18/23 0856  •  budesonide (PULMICORT) inhalation solution 0.5 mg, 0.5 mg, Nebulization, Q12H, Khurram Syed PA-C, 0.5 mg at 08/18/23 0715  •  diphenhydrAMINE (BENADRYL) injection 25 mg, 25 mg, Intravenous, Q6H PRN, Gem Stern MD, 25 mg at 08/16/23 3924  •  escitalopram (LEXAPRO) tablet 20 mg, 20 mg, Oral, Daily, Khurram Syed PA-C, 20 mg at 08/18/23 4371  •  fluticasone (FLONASE) 50 mcg/act nasal spray 1 spray, 1 spray, Nasal, BID, Khurram Syed PA-C, 1 spray at 08/18/23 1640  • formoterol (PERFOROMIST) nebulizer solution 20 mcg, 20 mcg, Nebulization, Q12H, Cristobal Leon PA-C, 20 mcg at 08/18/23 0715  •  furosemide (LASIX) injection 80 mg, 80 mg, Intravenous, TID (diuretic), Bryce Harrell PA-C, 80 mg at 08/18/23 0501  •  insulin glargine (LANTUS) subcutaneous injection 50 Units 0.5 mL, 50 Units, Subcutaneous, HS, Te Mahmood MD, 50 Units at 08/17/23 2151  •  insulin lispro (HumaLOG) 100 units/mL subcutaneous injection 20 Units, 20 Units, Subcutaneous, TID With Meals, Te Mahmood MD, 20 Units at 08/18/23 4375  •  insulin lispro (HumaLOG) 100 units/mL subcutaneous injection 4-20 Units, 4-20 Units, Subcutaneous, TID AC, 4 Units at 08/18/23 0099 **AND** [CANCELED] Fingerstick Glucose (POCT), , , TID ACTe MD  •  ipratropium (ATROVENT) 0.02 % inhalation solution 0.5 mg, 0.5 mg, Nebulization, TID, Cristobal Leon PA-C, 0.5 mg at 08/18/23 0715  •  levalbuterol (Marlyne Feller) inhalation solution 1.25 mg, 1.25 mg, Nebulization, TID, Cristobal Leon PA-C, 1.25 mg at 08/18/23 0715  •  loratadine (CLARITIN) tablet 10 mg, 10 mg, Oral, Daily, Cristobal Leon PA-C, 10 mg at 08/18/23 5318  •  LORazepam (ATIVAN) tablet 1 mg, 1 mg, Oral, HS, Cristobal Leon PA-C, 1 mg at 08/17/23 2151  •  metoprolol succinate (TOPROL-XL) 24 hr tablet 100 mg, 100 mg, Oral, Daily, Cristobal Leon PA-C, 100 mg at 08/17/23 3565  •  montelukast (SINGULAIR) tablet 10 mg, 10 mg, Oral, HS, Cristobal Leon PA-C, 10 mg at 08/17/23 2151  •  nystatin (MYCOSTATIN) powder, , Topical, BID, Sherrie Hardin MD, Given at 08/18/23 1938  •  pantoprazole (PROTONIX) EC tablet 40 mg, 40 mg, Oral, Early Morning, Cristobal Leon PA-C, 40 mg at 08/18/23 0507  •  potassium chloride (K-DUR,KLOR-CON) CR tablet 40 mEq, 40 mEq, Oral, BID, Bryce Harrell PA-C, 40 mEq at 08/18/23 8271  •  traZODone (DESYREL) tablet 150 mg, 150 mg, Oral, HS, Cristobal Leon PA-C, 150 mg at 08/17/23 2151      Labs/Data: Results from last 7 days   Lab Units 08/18/23  0451 08/17/23  0549 08/16/23  0508   WBC Thousand/uL 16.84* 18.97* 14.16*   HEMOGLOBIN g/dL 9.4* 9.1* 8.4*   HEMATOCRIT % 33.2* 31.9* 29.5*   PLATELETS Thousands/uL 353 361 311     Results from last 7 days   Lab Units 08/18/23  0451 08/17/23  0549 08/16/23  0508   POTASSIUM mmol/L 3.7 3.4* 3.4*   CHLORIDE mmol/L 94* 91* 93*   CO2 mmol/L 38* 40* 40*   BUN mg/dL 12 13 12   CREATININE mg/dL 0.54* 0.53* 0.52*

## 2023-08-18 NOTE — ASSESSMENT & PLAN NOTE
• Chronically 4L oxygen NC  • In ED placed on BIPAP then weaned to 12L [address] after receiving MERCER neb, 125mg IV solumedrol and 2g IV magnesium  • ABG on 6L oxygen: pH 7.3, pCO2 64.8, pO2 65.3, HCO3 33.7  • Received IV azithromycin and ceftriaxone in ED     Plan  • Consult Pulmonology-appreciate recommendations, will add IV iron x 3 days  • Respiratory protocol  • Wean oxygen to baseline as able  • Patient is currently 14 pounds over recent outpatient weight, 1+ pitting edema bilateral lower extremities.     • Wean oxygen as tolerated

## 2023-08-18 NOTE — ASSESSMENT & PLAN NOTE
Wt Readings from Last 3 Encounters:   08/18/23 129 kg (284 lb 13.4 oz)   07/26/23 132 kg (291 lb 9.6 oz)   06/08/23 129 kg (284 lb)     • Patient is currently 14 pounds over her most recent outpatient weight  • Chest x-ray shows pulmonary vascular congestion  • Pitting edema present; lung field auscultation difficult given body habitus  • BNP 73  • Received 60mg IV lasix in ED  • On admission, was placed back on torsemide 60 mg twice daily p.o.  • ins and outs, daily weights     Plan  • Low-sodium diabetic diet  • Switch to Lasix 80 mg IV every 8 hours on 8/15  • Strict I/O  • Daily weights  • Continue diuresis with Lasix as per cardiology  · Trend BMP

## 2023-08-19 LAB
ANION GAP SERPL CALCULATED.3IONS-SCNC: 7 MMOL/L
BASOPHILS # BLD AUTO: 0.04 THOUSANDS/ÂΜL (ref 0–0.1)
BASOPHILS NFR BLD AUTO: 0 % (ref 0–1)
BUN SERPL-MCNC: 12 MG/DL (ref 5–25)
CALCIUM SERPL-MCNC: 8 MG/DL (ref 8.4–10.2)
CHLORIDE SERPL-SCNC: 95 MMOL/L (ref 96–108)
CO2 SERPL-SCNC: 35 MMOL/L (ref 21–32)
CREAT SERPL-MCNC: 0.46 MG/DL (ref 0.6–1.3)
EOSINOPHIL # BLD AUTO: 0.4 THOUSAND/ÂΜL (ref 0–0.61)
EOSINOPHIL NFR BLD AUTO: 3 % (ref 0–6)
ERYTHROCYTE [DISTWIDTH] IN BLOOD BY AUTOMATED COUNT: 22 % (ref 11.6–15.1)
GFR SERPL CREATININE-BSD FRML MDRD: 105 ML/MIN/1.73SQ M
GLUCOSE SERPL-MCNC: 173 MG/DL (ref 65–140)
GLUCOSE SERPL-MCNC: 186 MG/DL (ref 65–140)
GLUCOSE SERPL-MCNC: 231 MG/DL (ref 65–140)
GLUCOSE SERPL-MCNC: 239 MG/DL (ref 65–140)
GLUCOSE SERPL-MCNC: 304 MG/DL (ref 65–140)
HCT VFR BLD AUTO: 32.5 % (ref 34.8–46.1)
HGB BLD-MCNC: 9.3 G/DL (ref 11.5–15.4)
IMM GRANULOCYTES # BLD AUTO: 0.19 THOUSAND/UL (ref 0–0.2)
IMM GRANULOCYTES NFR BLD AUTO: 2 % (ref 0–2)
LYMPHOCYTES # BLD AUTO: 2.15 THOUSANDS/ÂΜL (ref 0.6–4.47)
LYMPHOCYTES NFR BLD AUTO: 18 % (ref 14–44)
MCH RBC QN AUTO: 19.8 PG (ref 26.8–34.3)
MCHC RBC AUTO-ENTMCNC: 28.6 G/DL (ref 31.4–37.4)
MCV RBC AUTO: 69 FL (ref 82–98)
MONOCYTES # BLD AUTO: 0.77 THOUSAND/ÂΜL (ref 0.17–1.22)
MONOCYTES NFR BLD AUTO: 7 % (ref 4–12)
NEUTROPHILS # BLD AUTO: 8.19 THOUSANDS/ÂΜL (ref 1.85–7.62)
NEUTS SEG NFR BLD AUTO: 70 % (ref 43–75)
NRBC BLD AUTO-RTO: 0 /100 WBCS
PLATELET # BLD AUTO: 315 THOUSANDS/UL (ref 149–390)
PMV BLD AUTO: 11 FL (ref 8.9–12.7)
POTASSIUM SERPL-SCNC: 3.3 MMOL/L (ref 3.5–5.3)
RBC # BLD AUTO: 4.69 MILLION/UL (ref 3.81–5.12)
SODIUM SERPL-SCNC: 137 MMOL/L (ref 135–147)
WBC # BLD AUTO: 11.74 THOUSAND/UL (ref 4.31–10.16)

## 2023-08-19 PROCEDURE — 80048 BASIC METABOLIC PNL TOTAL CA: CPT | Performed by: INTERNAL MEDICINE

## 2023-08-19 PROCEDURE — 94760 N-INVAS EAR/PLS OXIMETRY 1: CPT

## 2023-08-19 PROCEDURE — 99232 SBSQ HOSP IP/OBS MODERATE 35: CPT | Performed by: INTERNAL MEDICINE

## 2023-08-19 PROCEDURE — 85025 COMPLETE CBC W/AUTO DIFF WBC: CPT | Performed by: INTERNAL MEDICINE

## 2023-08-19 PROCEDURE — 94660 CPAP INITIATION&MGMT: CPT

## 2023-08-19 PROCEDURE — 94640 AIRWAY INHALATION TREATMENT: CPT

## 2023-08-19 PROCEDURE — 82948 REAGENT STRIP/BLOOD GLUCOSE: CPT

## 2023-08-19 RX ADMIN — INSULIN LISPRO 20 UNITS: 100 INJECTION, SOLUTION INTRAVENOUS; SUBCUTANEOUS at 12:32

## 2023-08-19 RX ADMIN — INSULIN LISPRO 20 UNITS: 100 INJECTION, SOLUTION INTRAVENOUS; SUBCUTANEOUS at 08:17

## 2023-08-19 RX ADMIN — PANTOPRAZOLE SODIUM 40 MG: 40 TABLET, DELAYED RELEASE ORAL at 05:36

## 2023-08-19 RX ADMIN — METOPROLOL SUCCINATE 100 MG: 50 TABLET, EXTENDED RELEASE ORAL at 08:13

## 2023-08-19 RX ADMIN — IPRATROPIUM BROMIDE 0.5 MG: 0.5 SOLUTION RESPIRATORY (INHALATION) at 14:15

## 2023-08-19 RX ADMIN — FUROSEMIDE 80 MG: 10 INJECTION, SOLUTION INTRAMUSCULAR; INTRAVENOUS at 05:37

## 2023-08-19 RX ADMIN — BUDESONIDE 0.5 MG: 0.5 INHALANT ORAL at 19:21

## 2023-08-19 RX ADMIN — INSULIN LISPRO 20 UNITS: 100 INJECTION, SOLUTION INTRAVENOUS; SUBCUTANEOUS at 16:33

## 2023-08-19 RX ADMIN — APIXABAN 5 MG: 5 TABLET, FILM COATED ORAL at 18:43

## 2023-08-19 RX ADMIN — INSULIN LISPRO 4 UNITS: 100 INJECTION, SOLUTION INTRAVENOUS; SUBCUTANEOUS at 08:18

## 2023-08-19 RX ADMIN — INSULIN LISPRO 8 UNITS: 100 INJECTION, SOLUTION INTRAVENOUS; SUBCUTANEOUS at 16:34

## 2023-08-19 RX ADMIN — FLUTICASONE PROPIONATE 1 SPRAY: 50 SPRAY, METERED NASAL at 20:20

## 2023-08-19 RX ADMIN — APIXABAN 5 MG: 5 TABLET, FILM COATED ORAL at 08:16

## 2023-08-19 RX ADMIN — ACETAMINOPHEN 325MG 650 MG: 325 TABLET ORAL at 22:08

## 2023-08-19 RX ADMIN — FUROSEMIDE 80 MG: 10 INJECTION, SOLUTION INTRAMUSCULAR; INTRAVENOUS at 18:42

## 2023-08-19 RX ADMIN — BUDESONIDE 0.5 MG: 0.5 INHALANT ORAL at 07:16

## 2023-08-19 RX ADMIN — TRAZODONE HYDROCHLORIDE 150 MG: 150 TABLET ORAL at 22:09

## 2023-08-19 RX ADMIN — ESCITALOPRAM OXALATE 20 MG: 20 TABLET ORAL at 08:16

## 2023-08-19 RX ADMIN — ATORVASTATIN CALCIUM 40 MG: 40 TABLET, FILM COATED ORAL at 08:16

## 2023-08-19 RX ADMIN — FUROSEMIDE 80 MG: 10 INJECTION, SOLUTION INTRAMUSCULAR; INTRAVENOUS at 12:38

## 2023-08-19 RX ADMIN — POTASSIUM CHLORIDE 40 MEQ: 1500 TABLET, EXTENDED RELEASE ORAL at 18:43

## 2023-08-19 RX ADMIN — IPRATROPIUM BROMIDE 0.5 MG: 0.5 SOLUTION RESPIRATORY (INHALATION) at 07:16

## 2023-08-19 RX ADMIN — NYSTATIN: 100000 POWDER TOPICAL at 08:25

## 2023-08-19 RX ADMIN — MONTELUKAST 10 MG: 10 TABLET, FILM COATED ORAL at 22:09

## 2023-08-19 RX ADMIN — INSULIN LISPRO 8 UNITS: 100 INJECTION, SOLUTION INTRAVENOUS; SUBCUTANEOUS at 12:32

## 2023-08-19 RX ADMIN — LEVALBUTEROL HYDROCHLORIDE 1.25 MG: 1.25 SOLUTION RESPIRATORY (INHALATION) at 07:16

## 2023-08-19 RX ADMIN — INSULIN GLARGINE 50 UNITS: 100 INJECTION, SOLUTION SUBCUTANEOUS at 22:07

## 2023-08-19 RX ADMIN — LEVALBUTEROL HYDROCHLORIDE 1.25 MG: 1.25 SOLUTION RESPIRATORY (INHALATION) at 19:21

## 2023-08-19 RX ADMIN — LORATADINE 10 MG: 10 TABLET ORAL at 08:16

## 2023-08-19 RX ADMIN — LEVALBUTEROL HYDROCHLORIDE 1.25 MG: 1.25 SOLUTION RESPIRATORY (INHALATION) at 14:15

## 2023-08-19 RX ADMIN — LORAZEPAM 1 MG: 1 TABLET ORAL at 22:09

## 2023-08-19 RX ADMIN — IPRATROPIUM BROMIDE 0.5 MG: 0.5 SOLUTION RESPIRATORY (INHALATION) at 19:21

## 2023-08-19 RX ADMIN — POTASSIUM CHLORIDE 40 MEQ: 1500 TABLET, EXTENDED RELEASE ORAL at 08:16

## 2023-08-19 RX ADMIN — NYSTATIN: 100000 POWDER TOPICAL at 19:12

## 2023-08-19 RX ADMIN — FLUTICASONE PROPIONATE 1 SPRAY: 50 SPRAY, METERED NASAL at 08:24

## 2023-08-19 RX ADMIN — FORMOTEROL FUMARATE DIHYDRATE 20 MCG: 20 SOLUTION RESPIRATORY (INHALATION) at 07:16

## 2023-08-19 RX ADMIN — FORMOTEROL FUMARATE DIHYDRATE 20 MCG: 20 SOLUTION RESPIRATORY (INHALATION) at 19:21

## 2023-08-19 NOTE — ASSESSMENT & PLAN NOTE
Wt Readings from Last 3 Encounters:   08/19/23 128 kg (282 lb 10.1 oz)   07/26/23 132 kg (291 lb 9.6 oz)   06/08/23 129 kg (284 lb)     • Patient is currently 14 pounds over her most recent outpatient weight  • Chest x-ray shows pulmonary vascular congestion  • Pitting edema present; lung field auscultation difficult given body habitus  • BNP 73  • Received 60mg IV lasix in ED  • On admission, was placed back on torsemide 60 mg twice daily p.o.  • ins and outs, daily weights     Plan  • Low-sodium diabetic diet  • Switch to Lasix 80 mg IV every 8 hours on 8/15  • Strict I/O  • Daily weights  • Continue diuresis with Lasix as per cardiology  · Trend BMP

## 2023-08-19 NOTE — ASSESSMENT & PLAN NOTE
Lab Results   Component Value Date    HGBA1C 9.9 (H) 05/11/2023       Recent Labs     08/18/23  0727 08/18/23  1122 08/18/23  1619 08/18/23 2034   POCGLU 186* 224* 184* 231*       Blood Sugar Average: Last 72 hrs:  (P) [id number]   • Home regimen: Metformin, Glargine 30 units daily at bedtime, glulisine 24 units with breakfast, 12 units with lunch and dinner     SSI; Subcutaneous Insulin Order Set  Blood Glucose checks TIDWM and QHS (Q6H for NPO patients)  Hold oral medications  Blood Glucose goal while inpatient is 140-180  Reduce basal insulin by 25-50% while inpatient  Consistent Carbohydrate Diet  -- Blood glucose levels highly elevated; likely secondary to high-dose steroids. Was on insulin drip  -- Was tapered off insulin drip. Continue on Lantus insulin and Humalog with meals and sliding scale  -Patient completed the course of prednisone.   Will continue to monitor blood sugars closely to adjust Lantus insulin

## 2023-08-19 NOTE — PLAN OF CARE
Problem: PAIN - ADULT  Goal: Verbalizes/displays adequate comfort level or baseline comfort level  Description: Interventions:  - Encourage patient to monitor pain and request assistance  - Assess pain using appropriate pain scale  - Administer analgesics based on type and severity of pain and evaluate response  - Implement non-pharmacological measures as appropriate and evaluate response  - Consider cultural and social influences on pain and pain management  - Notify physician/advanced practitioner if interventions unsuccessful or patient reports new pain  Outcome: Progressing     Problem: INFECTION - ADULT  Goal: Absence or prevention of progression during hospitalization  Description: INTERVENTIONS:  - Assess and monitor for signs and symptoms of infection  - Monitor lab/diagnostic results  - Monitor all insertion sites, i.e. indwelling lines, tubes, and drains  - Monitor endotracheal if appropriate and nasal secretions for changes in amount and color  - [place] appropriate cooling/warming therapies per order  - Administer medications as ordered  - Instruct and encourage patient and family to use good hand hygiene technique  - Identify and instruct in appropriate isolation precautions for identified infection/condition  Outcome: Progressing     Problem: SAFETY ADULT  Goal: Patient will remain free of falls  Description: INTERVENTIONS:  - Educate patient/family on patient safety including physical limitations  - Instruct patient to call for assistance with activity   - Consult OT/PT to assist with strengthening/mobility   - Keep Call bell within reach  - Keep bed low and locked with side rails adjusted as appropriate  - Keep care items and personal belongings within reach  - Initiate and maintain comfort rounds  - Make Fall Risk Sign visible to staff  - Offer Toileting every 2 Hours, in advance of need  - Initiate/Maintain bed/ chair alarm  - Obtain necessary fall risk management equipment: n/a  - Apply yellow socks and bracelet for high fall risk patients  - Consider moving patient to room near nurses station  Outcome: Progressing  Goal: Maintain or return to baseline ADL function  Description: INTERVENTIONS:  -  Assess patient's ability to carry out ADLs; assess patient's baseline for ADL function and identify physical deficits which impact ability to perform ADLs (bathing, care of mouth/teeth, toileting, grooming, dressing, etc.)  - Assess/evaluate cause of self-care deficits   - Assess range of motion  - Assess patient's mobility; develop plan if impaired  - Assess patient's need for assistive devices and provide as appropriate  - Encourage maximum independence but intervene and supervise when necessary  - Involve family in performance of ADLs  - Assess for home care needs following discharge   - Consider OT consult to assist with ADL evaluation and planning for discharge  - Provide patient education as appropriate  Outcome: Progressing  Goal: Maintains/Returns to pre admission functional level  Description: INTERVENTIONS:  - Perform BMAT or MOVE assessment daily.   - Set and communicate daily mobility goal to care team and patient/family/caregiver. - Collaborate with rehabilitation services on mobility goals if consulted  - Perform Range of Motion 3 times a day. - Reposition patient every 2 hours.   - Dangle patient 3 times a day  - Stand patient 3 times a day  - Ambulate patient 3 times a day  - Out of bed to chair 3 times a day   - Out of bed for meals 3 times a day  - Out of bed for toileting  - Record patient progress and toleration of activity level   Outcome: Progressing     Problem: DISCHARGE PLANNING  Goal: Discharge to home or other facility with appropriate resources  Description: INTERVENTIONS:  - Identify barriers to discharge w/patient and caregiver  - Arrange for needed discharge resources and transportation as appropriate  - Identify discharge learning needs (meds, wound care, etc.)  - Arrange for interpretive services to assist at discharge as needed  - Refer to Case Management Department for coordinating discharge planning if the patient needs post-hospital services based on physician/advanced practitioner order or complex needs related to functional status, cognitive ability, or social support system  Outcome: Progressing     Problem: Knowledge Deficit  Goal: Patient/family/caregiver demonstrates understanding of disease process, treatment plan, medications, and discharge instructions  Description: Complete learning assessment and assess knowledge base.   Interventions:  - Provide teaching at level of understanding  - Provide teaching via preferred learning methods  Outcome: Progressing     Problem: RESPIRATORY - ADULT  Goal: Achieves optimal ventilation and oxygenation  Description: INTERVENTIONS:  - Assess for changes in respiratory status  - Assess for changes in mentation and behavior  - Position to facilitate oxygenation and minimize respiratory effort  - Oxygen administered by appropriate delivery if ordered  - Initiate smoking cessation education as indicated  - Encourage broncho-pulmonary hygiene including cough, deep breathe, Incentive Spirometry  - Assess the need for suctioning and aspirate as needed  - Assess and instruct to report SOB or any respiratory difficulty  - Respiratory Therapy support as indicated  Outcome: Progressing     Problem: METABOLIC, FLUID AND ELECTROLYTES - ADULT  Goal: Electrolytes maintained within normal limits  Description: INTERVENTIONS:  - Monitor labs and assess patient for signs and symptoms of electrolyte imbalances  - Administer electrolyte replacement as ordered  - Monitor response to electrolyte replacements, including repeat lab results as appropriate  - Instruct patient on fluid and nutrition as appropriate  Outcome: Progressing  Goal: Fluid balance maintained  Description: INTERVENTIONS:  - Monitor labs   - Monitor I/O and WT  - Instruct patient on fluid and nutrition as appropriate  - Assess for signs & symptoms of volume excess or deficit  Outcome: Progressing  Goal: Glucose maintained within target range  Description: INTERVENTIONS:  - Monitor Blood Glucose as ordered  - Assess for signs and symptoms of hyperglycemia and hypoglycemia  - Administer ordered medications to maintain glucose within target range  - Assess nutritional intake and initiate nutrition service referral as needed  Outcome: Progressing     Problem: Nutrition/Hydration-ADULT  Goal: Nutrient/Hydration intake appropriate for improving, restoring or maintaining nutritional needs  Description: Monitor and assess patient's nutrition/hydration status for malnutrition. Collaborate with interdisciplinary team and initiate plan and interventions as ordered. Monitor patient's weight and dietary intake as ordered or per policy. Utilize nutrition screening tool and intervene as necessary. Determine patient's food preferences and provide high-protein, high-caloric foods as appropriate.      INTERVENTIONS:  - Monitor oral intake, urinary output, labs, and treatment plans  - Assess nutrition and hydration status and recommend course of action  - Evaluate amount of meals eaten  - Assist patient with eating if necessary   - Allow adequate time for meals  - Recommend/ encourage appropriate diets, oral nutritional supplements, and vitamin/mineral supplements  - Order, calculate, and assess calorie counts as needed  - Recommend, monitor, and adjust tube feedings and TPN/PPN based on assessed needs  - Assess need for intravenous fluids  - Provide specific nutrition/hydration education as appropriate  - Include patient/family/caregiver in decisions related to nutrition  Outcome: Progressing     Problem: CARDIOVASCULAR - ADULT  Goal: Maintains optimal cardiac output and hemodynamic stability  Description: INTERVENTIONS:  - Monitor I/O, vital signs and rhythm  - Monitor for S/S and trends of decreased cardiac output  - Administer and titrate ordered vasoactive medications to optimize hemodynamic stability  - Assess quality of pulses, skin color and temperature  - Assess for signs of decreased coronary artery perfusion  - Instruct patient to report change in severity of symptoms  Outcome: Progressing     Problem: GENITOURINARY - ADULT  Goal: Maintains or returns to baseline urinary function  Description: INTERVENTIONS:  - Assess urinary function  - Encourage oral fluids to ensure adequate hydration if ordered  - Administer IV fluids as ordered to ensure adequate hydration  - Administer ordered medications as needed  - Offer frequent toileting  - Follow urinary retention protocol if ordered  Outcome: Progressing     Problem: SKIN/TISSUE INTEGRITY - ADULT  Goal: Skin Integrity remains intact(Skin Breakdown Prevention)  Description: Assess:  -Perform Jt assessment every shift  -Clean and moisturize skin every shift  -Inspect skin when repositioning, toileting, and assisting with ADLS  -Assess under medical devices such as nasal Cannula every shift  -Assess extremities for adequate circulation and sensation     Bed Management:  -Have minimal linens on bed & keep smooth, unwrinkled  -Change linens as needed when moist or perspiring  -Avoid sitting or lying in one position for more than 2 hours while in bed  -Keep HOB at 30 degrees     Toileting:  -Offer bedside commode  -Assess for incontinence every shift  -Use incontinent care products after each incontinent episode such as moisture barrier    Activity:  -Mobilize patient 3 times a day  -Encourage activity and walks on unit  -Encourage or provide ROM exercises   -Turn and reposition patient every 2 Hours  -Use appropriate equipment to lift or move patient in bed  -Instruct/ Assist with weight shifting every 15 minutes when out of bed in chair  -Consider limitation of chair time 4 hour intervals    Skin Care:  -Avoid use of baby powder, tape, friction and shearing, hot water or constrictive clothing  -Relieve pressure over bony prominences using wedge  -Do not massage red bony areas    Next Steps:  -Teach patient strategies to minimize risks such as pneumonia  -Consider consults to  interdisciplinary teams such as wound care  Outcome: Progressing     Problem: HEMATOLOGIC - ADULT  Goal: Maintains hematologic stability  Description: INTERVENTIONS  - Assess for signs and symptoms of bleeding or hemorrhage  - Monitor labs  - Administer supportive blood products/factors as ordered and appropriate  Outcome: Progressing     Problem: MUSCULOSKELETAL - ADULT  Goal: Maintain or return mobility to safest level of function  Description: INTERVENTIONS:  - Assess patient's ability to carry out ADLs; assess patient's baseline for ADL function and identify physical deficits which impact ability to perform ADLs (bathing, care of mouth/teeth, toileting, grooming, dressing, etc.)  - Assess/evaluate cause of self-care deficits   - Assess range of motion  - Assess patient's mobility  - Assess patient's need for assistive devices and provide as appropriate  - Encourage maximum independence but intervene and supervise when necessary  - Involve family in performance of ADLs  - Assess for home care needs following discharge   - Consider OT consult to assist with ADL evaluation and planning for discharge  - Provide patient education as appropriate  Outcome: Progressing     Problem: Prexisting or High Potential for Compromised Skin Integrity  Goal: Skin integrity is maintained or improved  Description: INTERVENTIONS:  - Identify patients at risk for skin breakdown  - Assess and monitor skin integrity  - Assess and monitor nutrition and hydration status  - Monitor labs   - Assess for incontinence   - Turn and reposition patient  - Assist with mobility/ambulation  - Relieve pressure over bony prominences  - Avoid friction and shearing  - Provide appropriate hygiene as needed including keeping skin clean and dry  - Evaluate need for skin moisturizer/barrier cream  - Collaborate with interdisciplinary team   - Patient/family teaching  - Consider wound care consult   Outcome: Progressing     Problem: Potential for Falls  Goal: Patient will remain free of falls  Description: INTERVENTIONS:  - Educate patient/family on patient safety including physical limitations  - Instruct patient to call for assistance with activity   - Consult OT/PT to assist with strengthening/mobility   - Keep Call bell within reach  - Keep bed low and locked with side rails adjusted as appropriate  - Keep care items and personal belongings within reach  - Initiate and maintain comfort rounds  - Make Fall Risk Sign visible to staff  - Offer Toileting every 2 Hours, in advance of need  - Initiate/Maintain bed/ chair alarm  - Obtain necessary fall risk management equipment: n/a  - Apply yellow socks and bracelet for high fall risk patients  - Consider moving patient to room near nurses station  Outcome: Progressing     Problem: MOBILITY - ADULT  Goal: Maintain or return to baseline ADL function  Description: INTERVENTIONS:  -  Assess patient's ability to carry out ADLs; assess patient's baseline for ADL function and identify physical deficits which impact ability to perform ADLs (bathing, care of mouth/teeth, toileting, grooming, dressing, etc.)  - Assess/evaluate cause of self-care deficits   - Assess range of motion  - Assess patient's mobility; develop plan if impaired  - Assess patient's need for assistive devices and provide as appropriate  - Encourage maximum independence but intervene and supervise when necessary  - Involve family in performance of ADLs  - Assess for home care needs following discharge   - Consider OT consult to assist with ADL evaluation and planning for discharge  - Provide patient education as appropriate  Outcome: Progressing  Goal: Maintains/Returns to pre admission functional level  Description: INTERVENTIONS:  - Perform BMAT or MOVE assessment daily.   - Set and communicate daily mobility goal to care team and patient/family/caregiver. - Collaborate with rehabilitation services on mobility goals if consulted  - Perform Range of Motion 3 times a day. - Reposition patient every 2 hours.   - Dangle patient 3 times a day  - Stand patient 3 times a day  - Ambulate patient 3 times a day  - Out of bed to chair 3 times a day   - Out of bed for meals 3 times a day  - Out of bed for toileting  - Record patient progress and toleration of activity level   Outcome: Progressing

## 2023-08-19 NOTE — PROGRESS NOTES
Pulmonary Progress Note  Andrews Rodríguez 57 y.o. female MRN: [id number]  Unit/Bed#: -01 Encounter: [id number]      Assessment/Plan:  Acute on chronic hypoxic and hypercapnic respiratory failure   Severe COPD with acute exacerbation  Diastolic heart failure, pulmonary hypertension with exacerbation  WILMA on AVAPS  Iron deficiency anemia  Atrial fibrillation on Eliquis  Morbid obesity     • Multifactorial etiology of hypoxia - COPD, hypoventilation, cardiogenic pulmonary edema - I think closer to her baseline  • At her baseline of 4LPM O2  • Repeat echo shows increased in PASP to 61mmHg from 48mHg prior  • C/w BiPAP qhs - patient uses AVAPS at home  • wean O2 as able.  Baseline O2 requirement 4LPM at baseline  • Completed steroid course  • c/w Xopenex/Atrovent TID  • Diuresis regimen per cardiology/primary team  • I discussed with our specialty medication coordinator - patient will be set up to receive Thierry Singh as an outpatient in our infusion centers - patient can call Pulmonary office at discharge and Vanessa Rendon can help her schedule the injection appointments  • Outpatient asthma/COPD regimen - on Bevespi, nebulized budesonide, nebulized albuterol, MDI albuterol at home.    • Follow up with Dr. Karrie Delgadillo as an outpatient  • Pulmonary will next see the patient on 8/21 Monday if she is not discharged by then    Subjective:  Sleeping wearing BiPAP this morning. On 4LPM now  Diuresing well with net negative 1950mL yesterday    Objective:  Vitals: Vitals personally reviewed  Vitals:    08/19/23 0038 08/19/23 0412 08/19/23 0442 08/19/23 0815   BP:  131/62  120/68   BP Location:  Right arm     Pulse:  68  95   Resp:  18     Temp:  (!) 97.4 °F (36.3 °C)     TempSrc:  Axillary     SpO2: 95% 98%  96%   Weight:   128 kg (282 lb 10.1 oz)    Height:          Body mass index is 50.07 kg/m².     Intake/Output Summary (Last 24 hours) at 8/19/2023 1134  Last data filed at 8/19/2023 1131  Gross per 24 hour   Intake 1680 ml Output 3550 ml   Net -1870 ml     Invasive Devices     Peripheral Intravenous Line  Duration           Peripheral IV 08/18/23 Left;Proximal;Ventral (anterior) Forearm 1 day                Physical Exam  General: Obese, elderly female sleeping in the bed, tolerating BIPAP  HEET: head is normocephalic, atraumatic.  Eyes EOMI, no scleral icterus  Neck: Supple, with full range of motion, no appreciable JVD, not using accessory muscles  CV:  Regular rhythm, +S1 S2  Lungs: Diminished breath sounds bilaterally. No wheezing  Abdomen: Soft, non-tender, non-distended  Extremities: No clubbing, cyanosis.   1+ pitting edema in lower extremities  Neuro: No focal motor/sensory deficits.  Patient is oriented to self, place, month/year, and situation  Skin: Warm, No rashes or ulcerations appreciable    Labs: I have personally reviewed pertinent lab results.   Laboratory and Diagnostics  Results from last 7 days   Lab Units 08/19/23  0442 08/18/23  0451 08/17/23  0549 08/16/23  0508 08/15/23  0446 08/14/23  0443 08/13/23  0513   WBC Thousand/uL 11.74* 16.84* 18.97* 14.16* 13.87* 13.88* 12.62*   HEMOGLOBIN g/dL 9.3* 9.4* 9.1* 8.4* 8.7* 8.4* 8.4*   HEMATOCRIT % 32.5* 33.2* 31.9* 29.5* 31.3* 29.4* 29.1*   PLATELETS Thousands/uL 315 353 361 311 322 304 273   NEUTROS PCT % 70 70 73 68 69 87*  --    MONOS PCT % 7 7 6 8 9 6  --    EOS PCT % 3 3 2 2 1 0  --      Results from last 7 days   Lab Units 08/19/23  0442 08/18/23  0451 08/17/23  0549 08/16/23  0508 08/15/23  0447 08/14/23  0443 08/13/23  0513   SODIUM mmol/L 137 139 137 139 138 136 135   POTASSIUM mmol/L 3.3* 3.7 3.4* 3.4* 3.3* 3.7 3.6   CHLORIDE mmol/L 95* 94* 91* 93* 93* 92* 91*   CO2 mmol/L 35* 38* 40* 40* 41* 38* 35*   ANION GAP mmol/L 7 7 6 6 4 6 9   BUN mg/dL 12 12 13 12 16 22 20   CREATININE mg/dL 0.46* 0.54* 0.53* 0.52* 0.54* 0.57* 0.62   CALCIUM mg/dL 8.0* 8.1* 7.8* 7.4* 7.4* 7.8* 8.6   GLUCOSE RANDOM mg/dL 173* 109 165* 181* 171* 269* 196*   ALT U/L  --   --   --   -- --   --  14   AST U/L  --   --   --   --   --   --  8*   ALK PHOS U/L  --   --   --   --   --   --  99   ALBUMIN g/dL  --   --   --   --   --   --  3.9   TOTAL BILIRUBIN mg/dL  --   --   --   --   --   --  0.32     Results from last 7 days   Lab Units 23  0451 08/15/23  0447 23  0513   MAGNESIUM mg/dL 2.4 2.4 2.4                                   Results from last 7 days   Lab Units 23  0513   PROCALCITONIN ng/ml 0.06           ABG:       Venous blood gas 7.5 44/11. Mixed metabolic alkalosis and respiratory alkalosis  7.49/46.5    Imaging and other studies: I have personally reviewed pertinent films in PACS   CXR - pulmonary vascular congestion, worse, +b/l pleural effusions    CXR 23 -no consolidation, effusions, pneumothorax  CT Chest 6/3/23 -scattered areas of hyperlucency likely representing air trapping. Innumerable small pulmonary nodules appear unchanged from at least     Pulmonary function testin/2022  Results:  FEV1/FVC Ratio: 57 %  Forced Vital Capacity:  1.08L    36 % predicted  FEV1: 0.62 L     26 % predicted  After administration of bronchodilator FEV1: 0.84 (+36%)     Lung volumes by body plethysmography: Total Lung Capacity 102 % predicted Residual volume 175 % predicted  RV/TLC ratio 171%     DLCO corrected for patients hemoglobin level: 54 %     6MWT:  Walked total of 2 mins; distance 109.6m      Echocardiogram:   10/2020•  Left Ventricle: Left ventricular cavity size is normal. Wall thickness is normal. The left ventricular ejection fraction is 60%. Systolic function is normal. Wall motion is normal. Diastolic function is moderately abnormal, consistent with grade II (pseudonormal) relaxation. •  Left Atrium: The atrium is mildly dilated. •  Tricuspid Valve: There is mild regurgitation. The right ventricular systolic pressure is mildly to moderately elevated. The estimated right ventricular systolic pressure is 83.64 mmHg.       Code Status: Level 1 - Full Pietro Covarrubias MD  Pulmonary, Critical Care and Sleep Medicine  [facility]     Portions of the record may have been created with voice recognition software. Occasional wrong word or "sound a like" substitutions may have occurred due to the inherent limitations of voice recognition software. Please read the chart carefully and recognize, using context, where substitutions have occurred.

## 2023-08-19 NOTE — PROGRESS NOTES
[facility]  Progress Note  Name: Lawson Hoff  MRN: [id number]  Unit/Bed#: -01 I Date of Admission: 8/11/2023   Date of Service: 8/19/2023 I Hospital Day: 8    Assessment/Plan   Class 3 severe obesity in adult [facility])  Assessment & Plan  • Encourage weight loss and lifestyle changes    Lactic acidosis  Assessment & Plan  • LA 2.4->2.6  • Trend       Acute on chronic diastolic congestive heart failure (HCC)  Assessment & Plan  Wt Readings from Last 3 Encounters:   08/19/23 128 kg (282 lb 10.1 oz)   07/26/23 132 kg (291 lb 9.6 oz)   06/08/23 129 kg (284 lb)     • Patient is currently 14 pounds over her most recent outpatient weight  • Chest x-ray shows pulmonary vascular congestion  • Pitting edema present; lung field auscultation difficult given body habitus  • BNP 73  • Received 60mg IV lasix in ED  • On admission, was placed back on torsemide 60 mg twice daily p.o.  • ins and outs, daily weights     Plan  • Low-sodium diabetic diet  • Switch to Lasix 80 mg IV every 8 hours on 8/15  • Strict I/O  • Daily weights  • Continue diuresis with Lasix as per cardiology  · Trend BMP    Paroxysmal atrial fibrillation ([address])  Assessment & Plan  • Home regimen: Eliquis, metoprolol  • Currently SR in hospital     Plan  • Continue Eliquis and metoprol    Acute on chronic respiratory failure with hypoxia (HCC)  Assessment & Plan  • Chronically 4L oxygen NC  • In ED placed on BIPAP then weaned to 12L [address] after receiving MERCER neb, 125mg IV solumedrol and 2g IV magnesium  • ABG on 6L oxygen: pH 7.3, pCO2 64.8, pO2 65.3, HCO3 33.7  • Received IV azithromycin and ceftriaxone in ED     Plan  • Consult Pulmonology-appreciate recommendations, will add IV iron x 3 days  • Respiratory protocol  • Wean oxygen to baseline as able  • Patient is currently 14 pounds over recent outpatient weight, 1+ pitting edema bilateral lower extremities.     • Wean oxygen as tolerated    Type 2 diabetes mellitus with stage 2 chronic kidney disease, with long-term current use of insulin [facility])  Assessment & Plan  Lab Results   Component Value Date    HGBA1C 9.9 (H) 05/11/2023       Recent Labs     08/18/23  0727 08/18/23  1122 08/18/23  1619 08/18/23 2034   POCGLU 186* 224* 184* 231*       Blood Sugar Average: Last 72 hrs:  (P) [id number]   • Home regimen: Metformin, Glargine 30 units daily at bedtime, glulisine 24 units with breakfast, 12 units with lunch and dinner     SSI; Subcutaneous Insulin Order Set  Blood Glucose checks TIDWM and QHS (Q6H for NPO patients)  Hold oral medications  Blood Glucose goal while inpatient is 140-180  Reduce basal insulin by 25-50% while inpatient  Consistent Carbohydrate Diet  -- Blood glucose levels highly elevated; likely secondary to high-dose steroids. Was on insulin drip  -- Was tapered off insulin drip. Continue on Lantus insulin and Humalog with meals and sliding scale  -Patient completed the course of prednisone. Will continue to monitor blood sugars closely to adjust Lantus insulin    Obstructive sleep apnea  Assessment & Plan  • Discharged 7/26 with Rx for nocturnal BIPAP  • Patient states she is noncompliant with BIPAP due to personal preference at home and continues to wear CPAP      Plan  • Tolerated BIPAP in hospital -->continue BIPAP HS    * Asthma with COPD with exacerbation ([address])  Assessment & Plan  • Home regimen: Albuterol, Bevespi, Budesonide, Benralalizumab, Montelukast, Xopenex  • Presented to ED from home via EMS for SOB since Tuesday. Patient states she has been noncompliant with nebulizer treatments and BIPAP HS at home since being discharged on 7/26/23. She says she has been compliant with inhalers, oral medications and CPAP HS.    • In ED placed on BIPAP then weaned to 12L [address] after receiving MERCER neb, 125mg IV solumedrol and 2g IV magnesium  • ABG on 6L oxygen: pH 7.3, pCO2 64.8, pO2 65.3, HCO3 33.7  • Received IV azithromycin and ceftriaxone in ED  • Afebrile, procal 0.05     Plan  • IV Solumedrol 40mg BID on admission   • Xopenex/Atrovent TID  • Pulmicort BID  • Performist BID   • Procalcitonin x2 is negative  • Will monitor off futher ABX. • Respiratory protocol  • Pulmonology consult appreciated  • Patient completed the course of prednisone. Labs & Imaging: I have personally reviewed pertinent reports. VTE Prophylaxis: in place. Code Status:   Level 1 - Full Code    Patient Centered Rounds: I have performed bedside rounds with nursing staff today. Discussions with Specialists or Other Care Team Provider: Cardiology    Education and Discussions with Family / Patient: Harlo Leyden family update    Current Length of Stay: 8 day(s)    Current Patient Status: Inpatient   Certification Statement: The patient will continue to require additional inpatient hospital stay due to see my assessment and plan. Subjective:   Patient is seen and examined at bedside. All other ROS are negative. Objective:    Vitals: Blood pressure 131/62, pulse 68, temperature (!) 97.4 °F (36.3 °C), temperature source Axillary, resp. rate 18, height 5' 3" (1.6 m), weight 128 kg (282 lb 10.1 oz), SpO2 98 %, not currently breastfeeding. ,Body mass index is 50.07 kg/m². SPO2 RA Rest    Flowsheet Row ED to Hosp-Admission (Current) from 8/11/2023 in [facility]   SpO2 98 %   SpO2 Activity At Rest   O2 Device Nasal cannula   O2 Flow Rate --        I&O:     Intake/Output Summary (Last 24 hours) at 8/19/2023 0800  Last data filed at 8/19/2023 0501  Gross per 24 hour   Intake 2100 ml   Output 4050 ml   Net -1950 ml       Physical Exam:    General- Alert, sitting comfortably in chair. Not in any acute distress. Neck- Supple, No JVD  CVS- regular, S1 and S2 normal  Chest- Bilateral Air entry, No rhochi, crackles or wheezing present.   Abdomen- soft, nontender, not distended, no guarding or rigidity, BS+  Extremities-  trace pedal edema, No calf tenderness                         Normal ROM in all extremities. CNS-   Alert, awake and orientedx3. No focal deficits present.     Invasive Devices     Peripheral Intravenous Line  Duration           Peripheral IV 08/18/23 Left;Proximal;Ventral (anterior) Forearm <1 day                      Social History  reviewed  Family History   Problem Relation Age of Onset   • Alzheimer's disease Mother    • Atrial fibrillation Mother    • Dementia Mother    • Heart disease Mother         heart problem   • Seizures Mother    • Parkinsonism Father    • Arthritis Father    • Atrial fibrillation Father    • No Known Problems Daughter    • Cri-du-chat syndrome Daughter    • Colon cancer Maternal Grandmother 80   • Diabetes type II Maternal Grandmother    • No Known Problems Brother    • No Known Problems Son    • Substance Abuse Neg Hx         mother,father   • Mental illness Neg Hx         mother,father   • Colon polyps Neg Hx     reviewed    Meds:  Current Facility-Administered Medications   Medication Dose Route Frequency Provider Last Rate Last Admin   • acetaminophen (TYLENOL) tablet 650 mg  650 mg Oral Q6H PRN Earna Jessi, PA-C   650 mg at 08/18/23 2212   • apixaban (ELIQUIS) tablet 5 mg  5 mg Oral BID Earna Jessi, PA-C   5 mg at 08/18/23 1717   • atorvastatin (LIPITOR) tablet 40 mg  40 mg Oral Daily Earna Jessi, PA-C   40 mg at 08/18/23 2022   • budesonide (PULMICORT) inhalation solution 0.5 mg  0.5 mg Nebulization Q12H Earna Jessi, PA-C   0.5 mg at 08/19/23 0716   • diphenhydrAMINE (BENADRYL) injection 25 mg  25 mg Intravenous Q6H PRN Vicente Mc MD   25 mg at 08/16/23 4291   • escitalopram (LEXAPRO) tablet 20 mg  20 mg Oral Daily Earna Jessi, PA-C   20 mg at 08/18/23 2263   • fluticasone (FLONASE) 50 mcg/act nasal spray 1 spray  1 spray Nasal BID Earna Jessi, PA-C   1 spray at 08/18/23 2210   • formoterol (PERFOROMIST) nebulizer solution 20 mcg  20 mcg Nebulization Q12H Itzel MARTIN Yoko Mcadams PA-C   20 mcg at 08/19/23 6295   • furosemide (LASIX) injection 80 mg  80 mg Intravenous TID (diuretic) Clemens Kehr Hosford, PA-C   80 mg at 08/19/23 0537   • insulin glargine (LANTUS) subcutaneous injection 50 Units 0.5 mL  50 Units Subcutaneous HS Daymon Skiff, MD   50 Units at 08/18/23 2210   • insulin lispro (HumaLOG) 100 units/mL subcutaneous injection 20 Units  20 Units Subcutaneous TID With Meals Daymon Skiff, MD   20 Units at 08/18/23 1717   • insulin lispro (HumaLOG) 100 units/mL subcutaneous injection 4-20 Units  4-20 Units Subcutaneous TID AC Daymon Skiff, MD   4 Units at 08/18/23 1717   • ipratropium (ATROVENT) 0.02 % inhalation solution 0.5 mg  0.5 mg Nebulization TID Evelyne Smith PA-C   0.5 mg at 08/19/23 2087   • levalbuterol (XOPENEX) inhalation solution 1.25 mg  1.25 mg Nebulization TID Evelyne Smith PA-C   1.25 mg at 08/19/23 0716   • loratadine (CLARITIN) tablet 10 mg  10 mg Oral Daily Evelyne Smith PA-C   10 mg at 08/18/23 4448   • LORazepam (ATIVAN) tablet 1 mg  1 mg Oral HS Evelyne Smith PA-C   1 mg at 08/18/23 2211   • metoprolol succinate (TOPROL-XL) 24 hr tablet 100 mg  100 mg Oral Daily Evelyne Smith PA-C   100 mg at 08/17/23 0804   • montelukast (SINGULAIR) tablet 10 mg  10 mg Oral HS Evelyne Smith PA-C   10 mg at 08/18/23 2211   • nystatin (MYCOSTATIN) powder   Topical BID Giovanny Monge MD   Given at 08/18/23 8503   • pantoprazole (PROTONIX) EC tablet 40 mg  40 mg Oral Early Morning Evelyne Smith PA-C   40 mg at 08/19/23 0536   • potassium chloride (K-DUR,KLOR-CON) CR tablet 40 mEq  40 mEq Oral BID Clemens Kehr Hosford, PA-C   40 mEq at 08/18/23 1717   • traZODone (DESYREL) tablet 150 mg  150 mg Oral HS Evelyne Smith PA-C   150 mg at 08/18/23 2211      Medications Prior to Admission   Medication   • apixaban (Eliquis) 5 mg   • atorvastatin (LIPITOR) 40 mg tablet   • escitalopram (LEXAPRO) 20 mg tablet   • fluticasone (FLONASE) 50 mcg/act nasal spray   • insulin glulisine (Apidra SoloStar) 100 units/mL injection pen   • loratadine (CLARITIN) 10 mg tablet   • LORazepam (ATIVAN) 0.5 mg tablet   • metFORMIN (GLUCOPHAGE-XR) 500 mg 24 hr tablet   • metoprolol succinate (TOPROL-XL) 100 mg 24 hr tablet   • montelukast (SINGULAIR) 10 mg tablet   • omeprazole (PriLOSEC) 40 MG capsule   • potassium chloride (Klor-Con M20) 20 mEq tablet   • torsemide (DEMADEX) 20 mg tablet   • traZODone (DESYREL) 150 mg tablet   • albuterol (2.5 mg/3 mL) 0.083 % nebulizer solution   • albuterol (PROVENTIL HFA,VENTOLIN HFA) 90 mcg/act inhaler   • Benralizumab 30 MG/ML SOAJ   • Blood Glucose Monitoring Suppl (BellaDati CONTOUR NEXT MONITOR) w/Device KIT   • Contour Next Test test strip   • CVS Lancets Thin 26G MISC   • EPINEPHrine (EPIPEN) 0.3 mg/0.3 mL SOAJ   • ferrous sulfate 220 (44 Fe) mg/5 mL solution   • glycopyrrolate-formoterol (BEVESPI AEROSPHERE) 9-4.8 MCG/ACT inhaler   • Insulin Pen Needle (BD Pen Needle Love 2nd Gen) 32G X 4 MM MISC   • levalbuterol (XOPENEX) 1.25 mg/0.5 mL nebulizer solution   • naloxone (NARCAN) 4 mg/0.1 mL nasal spray   • semaglutide, 0.25 or 0.5 mg/dose, (Ozempic, 0.25 or 0.5 MG/DOSE,) 2 mg/3 mL injection pen       Labs:  Results from last 7 days   Lab Units 08/19/23  0442 08/18/23  0451 08/17/23  0549   WBC Thousand/uL 11.74* 16.84* 18.97*   HEMOGLOBIN g/dL 9.3* 9.4* 9.1*   HEMATOCRIT % 32.5* 33.2* 31.9*   PLATELETS Thousands/uL 315 353 361   NEUTROS PCT % 70 70 73   LYMPHS PCT % 18 18 17   MONOS PCT % 7 7 6   EOS PCT % 3 3 2     Results from last 7 days   Lab Units 08/19/23  0442 08/18/23  0451 08/17/23  0549 08/14/23  0443 08/13/23  0513   POTASSIUM mmol/L 3.3* 3.7 3.4*   < > 3.6   CHLORIDE mmol/L 95* 94* 91*   < > 91*   CO2 mmol/L 35* 38* 40*   < > 35*   BUN mg/dL 12 12 13   < > 20   CREATININE mg/dL 0.46* 0.54* 0.53*   < > 0.62   CALCIUM mg/dL 8.0* 8.1* 7.8*   < > 8.6   ALK PHOS U/L  --   --   --   --  99   ALT U/L  --   --   --   --  14 AST U/L  --   --   --   --  8*    < > = values in this interval not displayed. Lab Results   Component Value Date    TROPONINI <0.02 06/03/2021    TROPONINI <0.02 06/03/2021    TROPONINI <0.02 06/03/2021    CKTOTAL 39 09/07/2017         Lab Results   Component Value Date    BLOODCX No Growth After 5 Days. 08/11/2023    BLOODCX No Growth After 5 Days. 08/11/2023    BLOODCX No Growth After 5 Days. 06/03/2023    SPUTUMCULTUR 2+ Growth of 05/21/2020    SPUTUMCULTUR  05/21/2020     Commensal respiratory nicholas only; No significant growth of Staph aureus/MRSA or Pseudomonas aeruginosa. Imaging:  Results for orders placed during the hospital encounter of 08/11/23    XR chest portable    Narrative  CHEST    INDICATION:   sob. COMPARISON: 8/14/2023    EXAM PERFORMED/VIEWS:  XR CHEST PORTABLE      FINDINGS:    Heart is top normal in size. Pulmonary vascular congestion and interstitial prominence is improved. No pneumothorax or pleural effusion. Osseous structures appear within normal limits for patient age. Impression  Slight improvement of pulmonary vascular congestion. Workstation performed: [id number]    Results for orders placed during the hospital encounter of 09/27/22    XR chest pa & lateral    Narrative  CHEST    INDICATION:   J44.9: Chronic obstructive pulmonary disease, unspecified  R06.02: Shortness of breath. COMPARISON:  6/5/2021    EXAM PERFORMED/VIEWS:  XR CHEST PA & LATERAL      FINDINGS:    Cardiomediastinal silhouette appears unremarkable. Mild arch calcifications again noted. Mild emphysematous changes are seen. Bilateral lower lobe hazy pulmonary infiltrates and/or subsegmental atelectasis noted. Upper to midlung fields appear adequately aerated and clear. No pneumothorax or pleural effusion. Osseous structures appear within normal limits for patient age. No free air in the upper abdomen. Impression  Mild emphysematous changes are seen.   Bilateral lower lobe hazy pulmonary infiltrates and/or subsegmental atelectasis noted. Upper to midlung fields appear adequately aerated and clear. Workstation performed: [id number]      Last 24 Hours Medication List:   Current Facility-Administered Medications   Medication Dose Route Frequency Provider Last Rate   • acetaminophen  650 mg Oral Q6H PRN Sharon Litparish, PA-C     • apixaban  5 mg Oral BID Sharon Litten, PA-C     • atorvastatin  40 mg Oral Daily Sharon Litten, PA-C     • budesonide  0.5 mg Nebulization Q12H Sharon Litten, PA-C     • diphenhydrAMINE  25 mg Intravenous Q6H PRN Bob Yancey MD     • escitalopram  20 mg Oral Daily Sharon Litten, PA-C     • fluticasone  1 spray Nasal BID Sharon Litten, PA-C     • formoterol  20 mcg Nebulization Q12H Sharon Litten, PA-C     • furosemide  80 mg Intravenous TID (diuretic) Sharon Harrell PA-C     • insulin glargine  50 Units Subcutaneous HS Dwaine Starr MD     • insulin lispro  20 Units Subcutaneous TID With Meals Dwaine Starr MD     • insulin lispro  4-20 Units Subcutaneous TID AC Dwaine Starr MD     • ipratropium  0.5 mg Nebulization TID Sharon Litten, PA-C     • levalbuterol  1.25 mg Nebulization TID Sharon Litten, PA-C     • loratadine  10 mg Oral Daily Sharon Litparish, PA-C     • LORazepam  1 mg Oral HS Sharon Litparish, PA-C     • metoprolol succinate  100 mg Oral Daily Sharon Litten, PA-C     • montelukast  10 mg Oral HS Sharon Litparish, PA-C     • nystatin   Topical BID Bob Yancey MD     • pantoprazole  40 mg Oral Early Morning Sharon Litparish, PA-C     • potassium chloride  40 mEq Oral BID Sharon Harrell PA-C     • traZODone  150 mg Oral HS Sharon Litparish, PADonnaC          Today, Patient Was Seen By: Dwaine Starr MD    ** Please Note: Dictation voice to text software may have been used in the creation of this document.  **

## 2023-08-19 NOTE — ASSESSMENT & PLAN NOTE
• Home regimen: Albuterol, Bevespi, Budesonide, Benralalizumab, Montelukast, Xopenex  • Presented to ED from home via EMS for SOB since Tuesday. Patient states she has been noncompliant with nebulizer treatments and BIPAP HS at home since being discharged on 7/26/23. She says she has been compliant with inhalers, oral medications and CPAP HS. • In ED placed on BIPAP then weaned to 12L [address] after receiving MERCER neb, 125mg IV solumedrol and 2g IV magnesium  • ABG on 6L oxygen: pH 7.3, pCO2 64.8, pO2 65.3, HCO3 33.7  • Received IV azithromycin and ceftriaxone in ED  • Afebrile, procal 0.05     Plan  • IV Solumedrol 40mg BID on admission   • Xopenex/Atrovent TID  • Pulmicort BID  • Performist BID   • Procalcitonin x2 is negative  • Will monitor off futher ABX. • Respiratory protocol  • Pulmonology consult appreciated  • Patient completed the course of prednisone.

## 2023-08-19 NOTE — PLAN OF CARE
Problem: PAIN - ADULT  Goal: Verbalizes/displays adequate comfort level or baseline comfort level  Description: Interventions:  - Encourage patient to monitor pain and request assistance  - Assess pain using appropriate pain scale  - Administer analgesics based on type and severity of pain and evaluate response  - Implement non-pharmacological measures as appropriate and evaluate response  - Consider cultural and social influences on pain and pain management  - Notify physician/advanced practitioner if interventions unsuccessful or patient reports new pain  Outcome: Progressing     Problem: INFECTION - ADULT  Goal: Absence or prevention of progression during hospitalization  Description: INTERVENTIONS:  - Assess and monitor for signs and symptoms of infection  - Monitor lab/diagnostic results  - Monitor all insertion sites, i.e. indwelling lines, tubes, and drains  - Monitor endotracheal if appropriate and nasal secretions for changes in amount and color  - [place] appropriate cooling/warming therapies per order  - Administer medications as ordered  - Instruct and encourage patient and family to use good hand hygiene technique  - Identify and instruct in appropriate isolation precautions for identified infection/condition  Outcome: Progressing     Problem: SAFETY ADULT  Goal: Patient will remain free of falls  Description: INTERVENTIONS:  - Educate patient/family on patient safety including physical limitations  - Instruct patient to call for assistance with activity   - Consult OT/PT to assist with strengthening/mobility   - Keep Call bell within reach  - Keep bed low and locked with side rails adjusted as appropriate  - Keep care items and personal belongings within reach  - Initiate and maintain comfort rounds  - Make Fall Risk Sign visible to staff  - Offer Toileting every 2 Hours, in advance of need  - Initiate/Maintain bed/ chair alarm  - Obtain necessary fall risk management equipment: n/a  - Apply yellow socks and bracelet for high fall risk patients  - Consider moving patient to room near nurses station  Outcome: Progressing  Goal: Maintain or return to baseline ADL function  Description: INTERVENTIONS:  -  Assess patient's ability to carry out ADLs; assess patient's baseline for ADL function and identify physical deficits which impact ability to perform ADLs (bathing, care of mouth/teeth, toileting, grooming, dressing, etc.)  - Assess/evaluate cause of self-care deficits   - Assess range of motion  - Assess patient's mobility; develop plan if impaired  - Assess patient's need for assistive devices and provide as appropriate  - Encourage maximum independence but intervene and supervise when necessary  - Involve family in performance of ADLs  - Assess for home care needs following discharge   - Consider OT consult to assist with ADL evaluation and planning for discharge  - Provide patient education as appropriate  Outcome: Progressing  Goal: Maintains/Returns to pre admission functional level  Description: INTERVENTIONS:  - Perform BMAT or MOVE assessment daily.   - Set and communicate daily mobility goal to care team and patient/family/caregiver. - Collaborate with rehabilitation services on mobility goals if consulted  - Perform Range of Motion 3 times a day. - Reposition patient every 2 hours.   - Dangle patient 3 times a day  - Stand patient 3 times a day  - Ambulate patient 3 times a day  - Out of bed to chair 3 times a day   - Out of bed for meals 3 times a day  - Out of bed for toileting  - Record patient progress and toleration of activity level   Outcome: Progressing     Problem: DISCHARGE PLANNING  Goal: Discharge to home or other facility with appropriate resources  Description: INTERVENTIONS:  - Identify barriers to discharge w/patient and caregiver  - Arrange for needed discharge resources and transportation as appropriate  - Identify discharge learning needs (meds, wound care, etc.)  - Arrange for interpretive services to assist at discharge as needed  - Refer to Case Management Department for coordinating discharge planning if the patient needs post-hospital services based on physician/advanced practitioner order or complex needs related to functional status, cognitive ability, or social support system  Outcome: Progressing     Problem: Knowledge Deficit  Goal: Patient/family/caregiver demonstrates understanding of disease process, treatment plan, medications, and discharge instructions  Description: Complete learning assessment and assess knowledge base.   Interventions:  - Provide teaching at level of understanding  - Provide teaching via preferred learning methods  Outcome: Progressing     Problem: RESPIRATORY - ADULT  Goal: Achieves optimal ventilation and oxygenation  Description: INTERVENTIONS:  - Assess for changes in respiratory status  - Assess for changes in mentation and behavior  - Position to facilitate oxygenation and minimize respiratory effort  - Oxygen administered by appropriate delivery if ordered  - Initiate smoking cessation education as indicated  - Encourage broncho-pulmonary hygiene including cough, deep breathe, Incentive Spirometry  - Assess the need for suctioning and aspirate as needed  - Assess and instruct to report SOB or any respiratory difficulty  - Respiratory Therapy support as indicated  Outcome: Progressing     Problem: METABOLIC, FLUID AND ELECTROLYTES - ADULT  Goal: Electrolytes maintained within normal limits  Description: INTERVENTIONS:  - Monitor labs and assess patient for signs and symptoms of electrolyte imbalances  - Administer electrolyte replacement as ordered  - Monitor response to electrolyte replacements, including repeat lab results as appropriate  - Instruct patient on fluid and nutrition as appropriate  Outcome: Progressing  Goal: Fluid balance maintained  Description: INTERVENTIONS:  - Monitor labs   - Monitor I/O and WT  - Instruct patient on fluid and nutrition as appropriate  - Assess for signs & symptoms of volume excess or deficit  Outcome: Progressing  Goal: Glucose maintained within target range  Description: INTERVENTIONS:  - Monitor Blood Glucose as ordered  - Assess for signs and symptoms of hyperglycemia and hypoglycemia  - Administer ordered medications to maintain glucose within target range  - Assess nutritional intake and initiate nutrition service referral as needed  Outcome: Progressing     Problem: Nutrition/Hydration-ADULT  Goal: Nutrient/Hydration intake appropriate for improving, restoring or maintaining nutritional needs  Description: Monitor and assess patient's nutrition/hydration status for malnutrition. Collaborate with interdisciplinary team and initiate plan and interventions as ordered. Monitor patient's weight and dietary intake as ordered or per policy. Utilize nutrition screening tool and intervene as necessary. Determine patient's food preferences and provide high-protein, high-caloric foods as appropriate.      INTERVENTIONS:  - Monitor oral intake, urinary output, labs, and treatment plans  - Assess nutrition and hydration status and recommend course of action  - Evaluate amount of meals eaten  - Assist patient with eating if necessary   - Allow adequate time for meals  - Recommend/ encourage appropriate diets, oral nutritional supplements, and vitamin/mineral supplements  - Order, calculate, and assess calorie counts as needed  - Recommend, monitor, and adjust tube feedings and TPN/PPN based on assessed needs  - Assess need for intravenous fluids  - Provide specific nutrition/hydration education as appropriate  - Include patient/family/caregiver in decisions related to nutrition  Outcome: Progressing     Problem: CARDIOVASCULAR - ADULT  Goal: Maintains optimal cardiac output and hemodynamic stability  Description: INTERVENTIONS:  - Monitor I/O, vital signs and rhythm  - Monitor for S/S and trends of decreased cardiac output  - Administer and titrate ordered vasoactive medications to optimize hemodynamic stability  - Assess quality of pulses, skin color and temperature  - Assess for signs of decreased coronary artery perfusion  - Instruct patient to report change in severity of symptoms  Outcome: Progressing     Problem: GENITOURINARY - ADULT  Goal: Maintains or returns to baseline urinary function  Description: INTERVENTIONS:  - Assess urinary function  - Encourage oral fluids to ensure adequate hydration if ordered  - Administer IV fluids as ordered to ensure adequate hydration  - Administer ordered medications as needed  - Offer frequent toileting  - Follow urinary retention protocol if ordered  Outcome: Progressing     Problem: SKIN/TISSUE INTEGRITY - ADULT  Goal: Skin Integrity remains intact(Skin Breakdown Prevention)  Description: Assess:  -Perform Jt assessment every shift  -Clean and moisturize skin every shift  -Inspect skin when repositioning, toileting, and assisting with ADLS  -Assess under medical devices such as nasal Cannula every shift  -Assess extremities for adequate circulation and sensation     Bed Management:  -Have minimal linens on bed & keep smooth, unwrinkled  -Change linens as needed when moist or perspiring  -Avoid sitting or lying in one position for more than 2 hours while in bed  -Keep HOB at 30 degrees     Toileting:  -Offer bedside commode  -Assess for incontinence every shift  -Use incontinent care products after each incontinent episode such as moisture barrier    Activity:  -Mobilize patient 3 times a day  -Encourage activity and walks on unit  -Encourage or provide ROM exercises   -Turn and reposition patient every 2 Hours  -Use appropriate equipment to lift or move patient in bed  -Instruct/ Assist with weight shifting every 15 minutes when out of bed in chair  -Consider limitation of chair time 4 hour intervals    Skin Care:  -Avoid use of baby powder, tape, friction and shearing, hot water or constrictive clothing  -Relieve pressure over bony prominences using wedge  -Do not massage red bony areas    Next Steps:  -Teach patient strategies to minimize risks such as pneumonia  -Consider consults to  interdisciplinary teams such as wound care  Outcome: Progressing     Problem: HEMATOLOGIC - ADULT  Goal: Maintains hematologic stability  Description: INTERVENTIONS  - Assess for signs and symptoms of bleeding or hemorrhage  - Monitor labs  - Administer supportive blood products/factors as ordered and appropriate  Outcome: Progressing     Problem: MUSCULOSKELETAL - ADULT  Goal: Maintain or return mobility to safest level of function  Description: INTERVENTIONS:  - Assess patient's ability to carry out ADLs; assess patient's baseline for ADL function and identify physical deficits which impact ability to perform ADLs (bathing, care of mouth/teeth, toileting, grooming, dressing, etc.)  - Assess/evaluate cause of self-care deficits   - Assess range of motion  - Assess patient's mobility  - Assess patient's need for assistive devices and provide as appropriate  - Encourage maximum independence but intervene and supervise when necessary  - Involve family in performance of ADLs  - Assess for home care needs following discharge   - Consider OT consult to assist with ADL evaluation and planning for discharge  - Provide patient education as appropriate  Outcome: Progressing     Problem: Prexisting or High Potential for Compromised Skin Integrity  Goal: Skin integrity is maintained or improved  Description: INTERVENTIONS:  - Identify patients at risk for skin breakdown  - Assess and monitor skin integrity  - Assess and monitor nutrition and hydration status  - Monitor labs   - Assess for incontinence   - Turn and reposition patient  - Assist with mobility/ambulation  - Relieve pressure over bony prominences  - Avoid friction and shearing  - Provide appropriate hygiene as needed including keeping skin clean and dry  - Evaluate need for skin moisturizer/barrier cream  - Collaborate with interdisciplinary team   - Patient/family teaching  - Consider wound care consult   Outcome: Progressing     Problem: Potential for Falls  Goal: Patient will remain free of falls  Description: INTERVENTIONS:  - Educate patient/family on patient safety including physical limitations  - Instruct patient to call for assistance with activity   - Consult OT/PT to assist with strengthening/mobility   - Keep Call bell within reach  - Keep bed low and locked with side rails adjusted as appropriate  - Keep care items and personal belongings within reach  - Initiate and maintain comfort rounds  - Make Fall Risk Sign visible to staff  - Offer Toileting every 2 Hours, in advance of need  - Initiate/Maintain bed/ chair alarm  - Obtain necessary fall risk management equipment: n/a  - Apply yellow socks and bracelet for high fall risk patients  - Consider moving patient to room near nurses station  Outcome: Progressing     Problem: MOBILITY - ADULT  Goal: Maintain or return to baseline ADL function  Description: INTERVENTIONS:  -  Assess patient's ability to carry out ADLs; assess patient's baseline for ADL function and identify physical deficits which impact ability to perform ADLs (bathing, care of mouth/teeth, toileting, grooming, dressing, etc.)  - Assess/evaluate cause of self-care deficits   - Assess range of motion  - Assess patient's mobility; develop plan if impaired  - Assess patient's need for assistive devices and provide as appropriate  - Encourage maximum independence but intervene and supervise when necessary  - Involve family in performance of ADLs  - Assess for home care needs following discharge   - Consider OT consult to assist with ADL evaluation and planning for discharge  - Provide patient education as appropriate  Outcome: Progressing  Goal: Maintains/Returns to pre admission functional level  Description: INTERVENTIONS:  - Perform BMAT or MOVE assessment daily.   - Set and communicate daily mobility goal to care team and patient/family/caregiver. - Collaborate with rehabilitation services on mobility goals if consulted  - Perform Range of Motion 3 times a day. - Reposition patient every 2 hours.   - Dangle patient 3 times a day  - Stand patient 3 times a day  - Ambulate patient 3 times a day  - Out of bed to chair 3 times a day   - Out of bed for meals 3 times a day  - Out of bed for toileting  - Record patient progress and toleration of activity level   Outcome: Progressing

## 2023-08-19 NOTE — ASSESSMENT & PLAN NOTE
• Chronically 4L oxygen NC  • In ED placed on BIPAP then weaned to 12L [address] after receiving MERCER neb, 125mg IV solumedrol and 2g IV magnesium  • ABG on 6L oxygen: pH 7.3, pCO2 64.8, pO2 65.3, HCO3 33.7  • Received IV azithromycin and ceftriaxone in ED     Plan  • Consult Pulmonology-appreciate recommendations, will add IV iron x 3 days  • Respiratory protocol  • Wean oxygen to baseline as able  • Patient is currently 14 pounds over recent outpatient weight, 1+ pitting edema bilateral lower extremities.     • Wean oxygen as tolerated

## 2023-08-20 LAB
ANION GAP SERPL CALCULATED.3IONS-SCNC: 6 MMOL/L
BASOPHILS # BLD AUTO: 0.04 THOUSANDS/ÂΜL (ref 0–0.1)
BASOPHILS NFR BLD AUTO: 0 % (ref 0–1)
BUN SERPL-MCNC: 10 MG/DL (ref 5–25)
CALCIUM SERPL-MCNC: 7.9 MG/DL (ref 8.4–10.2)
CHLORIDE SERPL-SCNC: 95 MMOL/L (ref 96–108)
CO2 SERPL-SCNC: 33 MMOL/L (ref 21–32)
CREAT SERPL-MCNC: 0.5 MG/DL (ref 0.6–1.3)
EOSINOPHIL # BLD AUTO: 0.48 THOUSAND/ÂΜL (ref 0–0.61)
EOSINOPHIL NFR BLD AUTO: 4 % (ref 0–6)
ERYTHROCYTE [DISTWIDTH] IN BLOOD BY AUTOMATED COUNT: 22.6 % (ref 11.6–15.1)
GFR SERPL CREATININE-BSD FRML MDRD: 102 ML/MIN/1.73SQ M
GLUCOSE SERPL-MCNC: 214 MG/DL (ref 65–140)
GLUCOSE SERPL-MCNC: 228 MG/DL (ref 65–140)
GLUCOSE SERPL-MCNC: 265 MG/DL (ref 65–140)
GLUCOSE SERPL-MCNC: 278 MG/DL (ref 65–140)
GLUCOSE SERPL-MCNC: 387 MG/DL (ref 65–140)
HCT VFR BLD AUTO: 33.7 % (ref 34.8–46.1)
HGB BLD-MCNC: 9.6 G/DL (ref 11.5–15.4)
IMM GRANULOCYTES # BLD AUTO: 0.15 THOUSAND/UL (ref 0–0.2)
IMM GRANULOCYTES NFR BLD AUTO: 1 % (ref 0–2)
LYMPHOCYTES # BLD AUTO: 2.12 THOUSANDS/ÂΜL (ref 0.6–4.47)
LYMPHOCYTES NFR BLD AUTO: 16 % (ref 14–44)
MCH RBC QN AUTO: 19.8 PG (ref 26.8–34.3)
MCHC RBC AUTO-ENTMCNC: 28.5 G/DL (ref 31.4–37.4)
MCV RBC AUTO: 69 FL (ref 82–98)
MONOCYTES # BLD AUTO: 0.83 THOUSAND/ÂΜL (ref 0.17–1.22)
MONOCYTES NFR BLD AUTO: 6 % (ref 4–12)
NEUTROPHILS # BLD AUTO: 9.63 THOUSANDS/ÂΜL (ref 1.85–7.62)
NEUTS SEG NFR BLD AUTO: 73 % (ref 43–75)
NRBC BLD AUTO-RTO: 0 /100 WBCS
PLATELET # BLD AUTO: 291 THOUSANDS/UL (ref 149–390)
PMV BLD AUTO: 10.3 FL (ref 8.9–12.7)
POTASSIUM SERPL-SCNC: 3.6 MMOL/L (ref 3.5–5.3)
RBC # BLD AUTO: 4.86 MILLION/UL (ref 3.81–5.12)
SODIUM SERPL-SCNC: 134 MMOL/L (ref 135–147)
WBC # BLD AUTO: 13.25 THOUSAND/UL (ref 4.31–10.16)

## 2023-08-20 PROCEDURE — 99232 SBSQ HOSP IP/OBS MODERATE 35: CPT | Performed by: INTERNAL MEDICINE

## 2023-08-20 PROCEDURE — 82948 REAGENT STRIP/BLOOD GLUCOSE: CPT

## 2023-08-20 PROCEDURE — 85025 COMPLETE CBC W/AUTO DIFF WBC: CPT | Performed by: INTERNAL MEDICINE

## 2023-08-20 PROCEDURE — 94002 VENT MGMT INPAT INIT DAY: CPT

## 2023-08-20 PROCEDURE — 94760 N-INVAS EAR/PLS OXIMETRY 1: CPT

## 2023-08-20 PROCEDURE — 94660 CPAP INITIATION&MGMT: CPT

## 2023-08-20 PROCEDURE — 94640 AIRWAY INHALATION TREATMENT: CPT

## 2023-08-20 PROCEDURE — 80048 BASIC METABOLIC PNL TOTAL CA: CPT | Performed by: INTERNAL MEDICINE

## 2023-08-20 RX ADMIN — IPRATROPIUM BROMIDE 0.5 MG: 0.5 SOLUTION RESPIRATORY (INHALATION) at 13:22

## 2023-08-20 RX ADMIN — FLUTICASONE PROPIONATE 1 SPRAY: 50 SPRAY, METERED NASAL at 08:22

## 2023-08-20 RX ADMIN — TRAZODONE HYDROCHLORIDE 150 MG: 150 TABLET ORAL at 21:34

## 2023-08-20 RX ADMIN — METOPROLOL SUCCINATE 100 MG: 50 TABLET, EXTENDED RELEASE ORAL at 08:21

## 2023-08-20 RX ADMIN — ATORVASTATIN CALCIUM 40 MG: 40 TABLET, FILM COATED ORAL at 08:21

## 2023-08-20 RX ADMIN — INSULIN LISPRO 8 UNITS: 100 INJECTION, SOLUTION INTRAVENOUS; SUBCUTANEOUS at 17:08

## 2023-08-20 RX ADMIN — IPRATROPIUM BROMIDE 0.5 MG: 0.5 SOLUTION RESPIRATORY (INHALATION) at 18:43

## 2023-08-20 RX ADMIN — PANTOPRAZOLE SODIUM 40 MG: 40 TABLET, DELAYED RELEASE ORAL at 05:02

## 2023-08-20 RX ADMIN — BUDESONIDE 0.5 MG: 0.5 INHALANT ORAL at 07:15

## 2023-08-20 RX ADMIN — FORMOTEROL FUMARATE DIHYDRATE 20 MCG: 20 SOLUTION RESPIRATORY (INHALATION) at 18:43

## 2023-08-20 RX ADMIN — INSULIN LISPRO 20 UNITS: 100 INJECTION, SOLUTION INTRAVENOUS; SUBCUTANEOUS at 08:22

## 2023-08-20 RX ADMIN — IPRATROPIUM BROMIDE 0.5 MG: 0.5 SOLUTION RESPIRATORY (INHALATION) at 07:15

## 2023-08-20 RX ADMIN — LORAZEPAM 1 MG: 1 TABLET ORAL at 21:34

## 2023-08-20 RX ADMIN — ESCITALOPRAM OXALATE 20 MG: 20 TABLET ORAL at 08:21

## 2023-08-20 RX ADMIN — FUROSEMIDE 80 MG: 10 INJECTION, SOLUTION INTRAMUSCULAR; INTRAVENOUS at 17:04

## 2023-08-20 RX ADMIN — INSULIN LISPRO 8 UNITS: 100 INJECTION, SOLUTION INTRAVENOUS; SUBCUTANEOUS at 12:12

## 2023-08-20 RX ADMIN — INSULIN LISPRO 20 UNITS: 100 INJECTION, SOLUTION INTRAVENOUS; SUBCUTANEOUS at 12:13

## 2023-08-20 RX ADMIN — INSULIN LISPRO 20 UNITS: 100 INJECTION, SOLUTION INTRAVENOUS; SUBCUTANEOUS at 17:04

## 2023-08-20 RX ADMIN — INSULIN LISPRO 12 UNITS: 100 INJECTION, SOLUTION INTRAVENOUS; SUBCUTANEOUS at 08:22

## 2023-08-20 RX ADMIN — LEVALBUTEROL HYDROCHLORIDE 1.25 MG: 1.25 SOLUTION RESPIRATORY (INHALATION) at 18:43

## 2023-08-20 RX ADMIN — LORATADINE 10 MG: 10 TABLET ORAL at 08:21

## 2023-08-20 RX ADMIN — APIXABAN 5 MG: 5 TABLET, FILM COATED ORAL at 17:04

## 2023-08-20 RX ADMIN — NYSTATIN: 100000 POWDER TOPICAL at 17:04

## 2023-08-20 RX ADMIN — NYSTATIN: 100000 POWDER TOPICAL at 08:21

## 2023-08-20 RX ADMIN — FUROSEMIDE 80 MG: 10 INJECTION, SOLUTION INTRAMUSCULAR; INTRAVENOUS at 05:03

## 2023-08-20 RX ADMIN — FORMOTEROL FUMARATE DIHYDRATE 20 MCG: 20 SOLUTION RESPIRATORY (INHALATION) at 07:15

## 2023-08-20 RX ADMIN — MONTELUKAST 10 MG: 10 TABLET, FILM COATED ORAL at 21:34

## 2023-08-20 RX ADMIN — LEVALBUTEROL HYDROCHLORIDE 1.25 MG: 1.25 SOLUTION RESPIRATORY (INHALATION) at 13:22

## 2023-08-20 RX ADMIN — BUDESONIDE 0.5 MG: 0.5 INHALANT ORAL at 18:44

## 2023-08-20 RX ADMIN — LEVALBUTEROL HYDROCHLORIDE 1.25 MG: 1.25 SOLUTION RESPIRATORY (INHALATION) at 07:15

## 2023-08-20 RX ADMIN — APIXABAN 5 MG: 5 TABLET, FILM COATED ORAL at 08:21

## 2023-08-20 RX ADMIN — FLUTICASONE PROPIONATE 1 SPRAY: 50 SPRAY, METERED NASAL at 21:35

## 2023-08-20 RX ADMIN — FUROSEMIDE 80 MG: 10 INJECTION, SOLUTION INTRAMUSCULAR; INTRAVENOUS at 12:21

## 2023-08-20 RX ADMIN — ACETAMINOPHEN 325MG 650 MG: 325 TABLET ORAL at 21:34

## 2023-08-20 RX ADMIN — POTASSIUM CHLORIDE 40 MEQ: 1500 TABLET, EXTENDED RELEASE ORAL at 08:21

## 2023-08-20 RX ADMIN — INSULIN GLARGINE 50 UNITS: 100 INJECTION, SOLUTION SUBCUTANEOUS at 21:34

## 2023-08-20 RX ADMIN — POTASSIUM CHLORIDE 40 MEQ: 1500 TABLET, EXTENDED RELEASE ORAL at 17:04

## 2023-08-20 NOTE — PLAN OF CARE
Problem: PAIN - ADULT  Goal: Verbalizes/displays adequate comfort level or baseline comfort level  Description: Interventions:  - Encourage patient to monitor pain and request assistance  - Assess pain using appropriate pain scale  - Administer analgesics based on type and severity of pain and evaluate response  - Implement non-pharmacological measures as appropriate and evaluate response  - Consider cultural and social influences on pain and pain management  - Notify physician/advanced practitioner if interventions unsuccessful or patient reports new pain  8/20/2023 0336 by Yumiko Tejada RN  Outcome: Progressing  8/20/2023 0336 by Yumiko Tejada RN  Outcome: Progressing

## 2023-08-20 NOTE — ASSESSMENT & PLAN NOTE
Wt Readings from Last 3 Encounters:   08/20/23 126 kg (278 lb 14.1 oz)   07/26/23 132 kg (291 lb 9.6 oz)   06/08/23 129 kg (284 lb)     • Patient is currently 14 pounds over her most recent outpatient weight  • Chest x-ray shows pulmonary vascular congestion  • Pitting edema present; lung field auscultation difficult given body habitus  • BNP 73  • Received 60mg IV lasix in ED  • On admission, was placed back on torsemide 60 mg twice daily p.o.  • ins and outs, daily weights     Plan  • Low-sodium diabetic diet  • Switched to Lasix 80 mg IV every 8 hours on 8/15  • Strict I/O  • Daily weights  • Continue diuresis with Lasix as per cardiology  • Cardiology follow-up noted today.   Continue diuresis  · Trend BMP

## 2023-08-20 NOTE — PROGRESS NOTES
[facility]  Progress Note  Name: Maryfrances Klinefelter  MRN: [id number]  Unit/Bed#: -01 I Date of Admission: 8/11/2023   Date of Service: 8/20/2023 I Hospital Day: 9    Assessment/Plan   Class 3 severe obesity in adult [facility])  Assessment & Plan  • Encourage weight loss and lifestyle changes    Lactic acidosis  Assessment & Plan  • LA 2.4->2.6  • Trend       Acute on chronic diastolic congestive heart failure (HCC)  Assessment & Plan  Wt Readings from Last 3 Encounters:   08/20/23 126 kg (278 lb 14.1 oz)   07/26/23 132 kg (291 lb 9.6 oz)   06/08/23 129 kg (284 lb)     • Patient is currently 14 pounds over her most recent outpatient weight  • Chest x-ray shows pulmonary vascular congestion  • Pitting edema present; lung field auscultation difficult given body habitus  • BNP 73  • Received 60mg IV lasix in ED  • On admission, was placed back on torsemide 60 mg twice daily p.o.  • ins and outs, daily weights     Plan  • Low-sodium diabetic diet  • Switched to Lasix 80 mg IV every 8 hours on 8/15  • Strict I/O  • Daily weights  • Continue diuresis with Lasix as per cardiology  • Cardiology follow-up noted today. Continue diuresis  · Trend BMP    Paroxysmal atrial fibrillation (HCC)  Assessment & Plan  • Home regimen: Eliquis, metoprolol  • Currently SR in hospital     Plan  • Continue Eliquis and metoprol    Acute on chronic respiratory failure with hypoxia (HCC)  Assessment & Plan  • Chronically 4L oxygen NC  • In ED placed on BIPAP then weaned to 12L [address] [address] after receiving MERCER neb, 125mg IV solumedrol and 2g IV magnesium  • ABG on 6L oxygen: pH 7.3, pCO2 64.8, pO2 65.3, HCO3 33.7  • Received IV azithromycin and ceftriaxone in ED     Plan  • Consult Pulmonology-appreciate recommendations, will add IV iron x 3 days  • Respiratory protocol  • Wean oxygen to baseline as able  • Patient is currently 14 pounds over recent outpatient weight, 1+ pitting edema bilateral lower extremities. • Continue diuresis  • Patient oxygen requirements are back to baseline  • Wean oxygen as tolerated    Type 2 diabetes mellitus with stage 2 chronic kidney disease, with long-term current use of insulin [facility])  Assessment & Plan  Lab Results   Component Value Date    HGBA1C 9.9 (H) 05/11/2023       Recent Labs     08/19/23  1130 08/19/23  1609 08/19/23  2044 08/20/23  0728   POCGLU 239* 231* 304* 278*       Blood Sugar Average: Last 72 hrs:  (P) [id number]   • Home regimen: Metformin, Glargine 30 units daily at bedtime, glulisine 24 units with breakfast, 12 units with lunch and dinner     SSI; Subcutaneous Insulin Order Set  Blood Glucose checks TIDWM and QHS (Q6H for NPO patients)  Hold oral medications  Blood Glucose goal while inpatient is 140-180  Reduce basal insulin by 25-50% while inpatient  Consistent Carbohydrate Diet  -- Blood glucose levels highly elevated; likely secondary to high-dose steroids. Was on insulin drip  -- Was tapered off insulin drip. Continue on Lantus insulin and Humalog with meals and sliding scale  -Patient completed the course of prednisone. Will continue to monitor blood sugars closely to adjust Lantus insulin    Obstructive sleep apnea  Assessment & Plan  • Discharged 7/26 with Rx for nocturnal BIPAP  • Patient states she is noncompliant with BIPAP due to personal preference at home and continues to wear CPAP      Plan  • Tolerated BIPAP in hospital -->continue BIPAP HS    * Asthma with COPD with exacerbation ([address])  Assessment & Plan  • Home regimen: Albuterol, Bevespi, Budesonide, Benralalizumab, Montelukast, Xopenex  • Presented to ED from home via EMS for SOB since Tuesday. Patient states she has been noncompliant with nebulizer treatments and BIPAP HS at home since being discharged on 7/26/23. She says she has been compliant with inhalers, oral medications and CPAP HS.    • In ED placed on BIPAP then weaned to 12L [address] after receiving MERCER neb, 125mg IV solumedrol and 2g IV magnesium  • ABG on 6L oxygen: pH 7.3, pCO2 64.8, pO2 65.3, HCO3 33.7  • Received IV azithromycin and ceftriaxone in ED  • Afebrile, procal 0.05     Plan  • IV Solumedrol 40mg BID on admission   • Xopenex/Atrovent TID  • Pulmicort BID  • Performist BID   • Procalcitonin x2 is negative  • Will monitor off futher ABX. • Respiratory protocol  • Pulmonology consult appreciated  • Patient completed the course of prednisone. Labs & Imaging: I have personally reviewed pertinent reports. VTE Prophylaxis: in place. Code Status:   Level 1 - Full Code    Patient Centered Rounds: I have performed bedside rounds with nursing staff today. Discussions with Specialists or Other Care Team Provider: Cardiology    Education and Discussions with Family / Patient: Patient declined family update    Current Length of Stay: 9 day(s)    Current Patient Status: Inpatient   Certification Statement: The patient will continue to require additional inpatient hospital stay due to see my assessment and plan. Subjective:   Patient is seen and examined at bedside. Denies any new complaints. Afebrile  All other ROS are negative. Objective:    Vitals: Blood pressure (!) 115/48, pulse 70, temperature 97.8 °F (36.6 °C), resp. rate 12, height 5' 3" (1.6 m), weight 126 kg (278 lb 14.1 oz), SpO2 98 %, not currently breastfeeding. ,Body mass index is 49.4 kg/m². SPO2 RA Rest    Flowsheet Row ED to Hosp-Admission (Current) from 8/11/2023 in [facility]   SpO2 98 %   SpO2 Activity At Rest   O2 Device BiPAP   O2 Flow Rate --        I&O:     Intake/Output Summary (Last 24 hours) at 8/20/2023 0958  Last data filed at 8/20/2023 0942  Gross per 24 hour   Intake 1164 ml   Output 3800 ml   Net -2636 ml       Physical Exam:    General- Alert, lying comfortably in bed. Not in any acute distress.   Neck- Supple, No JVD  CVS- regular, S1 and S2 normal  Chest- Bilateral Air entry, No rhochi, crackles or wheezing present. Abdomen- soft, nontender, not distended, no guarding or rigidity, BS+  Extremities-  trace pedal edema, No calf tenderness                         Normal ROM in all extremities. CNS-   Alert, awake and orientedx3. No focal deficits present. Invasive Devices     Peripheral Intravenous Line  Duration           Peripheral IV 08/19/23 Dorsal (posterior); Left Forearm <1 day                      Social History  reviewed  Family History   Problem Relation Age of Onset   • Alzheimer's disease Mother    • Atrial fibrillation Mother    • Dementia Mother    • Heart disease Mother         heart problem   • Seizures Mother    • Parkinsonism Father    • Arthritis Father    • Atrial fibrillation Father    • No Known Problems Daughter    • Cri-du-chat syndrome Daughter    • Colon cancer Maternal Grandmother 80   • Diabetes type II Maternal Grandmother    • No Known Problems Brother    • No Known Problems Son    • Substance Abuse Neg Hx         mother,father   • Mental illness Neg Hx         mother,father   • Colon polyps Neg Hx     reviewed    Meds:  Current Facility-Administered Medications   Medication Dose Route Frequency Provider Last Rate Last Admin   • acetaminophen (TYLENOL) tablet 650 mg  650 mg Oral Q6H PRN Regine Fagan, PA-C   650 mg at 08/19/23 2208   • apixaban (ELIQUIS) tablet 5 mg  5 mg Oral BID Pheobe Hostterri, PA-C   5 mg at 08/20/23 2109   • atorvastatin (LIPITOR) tablet 40 mg  40 mg Oral Daily Pheobe Gracia, PA-C   40 mg at 08/20/23 3991   • budesonide (PULMICORT) inhalation solution 0.5 mg  0.5 mg Nebulization Q12H Pheobe Gracia, PA-C   0.5 mg at 08/20/23 0715   • diphenhydrAMINE (BENADRYL) injection 25 mg  25 mg Intravenous Q6H PRN Teresa Whalen MD   25 mg at 08/16/23 3733   • escitalopram (LEXAPRO) tablet 20 mg  20 mg Oral Daily Pheobe Hostterri, PA-C   20 mg at 08/20/23 2460   • fluticasone (FLONASE) 50 mcg/act nasal spray 1 spray  1 spray Nasal BID Temitope Younger Eufemia Lawler PA-C   1 spray at 08/20/23 1951   • formoterol (PERFOROMIST) nebulizer solution 20 mcg  20 mcg Nebulization Q12H Verona Foster PA-C   20 mcg at 08/20/23 0715   • furosemide (LASIX) injection 80 mg  80 mg Intravenous TID (diuretic) Esther Harrell PA-C   80 mg at 08/20/23 0503   • insulin glargine (LANTUS) subcutaneous injection 50 Units 0.5 mL  50 Units Subcutaneous HS Ted Brown MD   50 Units at 08/19/23 2207   • insulin lispro (HumaLOG) 100 units/mL subcutaneous injection 20 Units  20 Units Subcutaneous TID With Meals Ted Brown MD   20 Units at 08/20/23 6005   • insulin lispro (HumaLOG) 100 units/mL subcutaneous injection 4-20 Units  4-20 Units Subcutaneous TID [facility] Ted Brown MD   12 Units at 08/20/23 2564   • ipratropium (ATROVENT) 0.02 % inhalation solution 0.5 mg  0.5 mg Nebulization TID Verona Foster PA-C   0.5 mg at 08/20/23 0715   • levalbuterol (Sivakumar Pilling) inhalation solution 1.25 mg  1.25 mg Nebulization TID Verona Foster PA-C   1.25 mg at 08/20/23 0715   • loratadine (CLARITIN) tablet 10 mg  10 mg Oral Daily Verona Foster PA-C   10 mg at 08/20/23 8809   • LORazepam (ATIVAN) tablet 1 mg  1 mg Oral HS Verona Foster PA-C   1 mg at 08/19/23 2209   • metoprolol succinate (TOPROL-XL) 24 hr tablet 100 mg  100 mg Oral Daily Verona Foster PA-C   100 mg at 08/20/23 9069   • montelukast (SINGULAIR) tablet 10 mg  10 mg Oral HS Verona Foster PA-C   10 mg at 08/19/23 2209   • nystatin (MYCOSTATIN) powder   Topical BID Susie Arcos MD   Given at 08/20/23 3321   • pantoprazole (PROTONIX) EC tablet 40 mg  40 mg Oral Early Morning Verona Foster PA-C   40 mg at 08/20/23 0502   • potassium chloride (K-DUR,KLOR-CON) CR tablet 40 mEq  40 mEq Oral BID Esther Harrell PA-C   40 mEq at 08/20/23 6841   • traZODone (DESYREL) tablet 150 mg  150 mg Oral HS Verona Foster PA-C   150 mg at 08/19/23 2209      Medications Prior to Admission   Medication   • apixaban (Eliquis) 5 mg   • atorvastatin (LIPITOR) 40 mg tablet   • escitalopram (LEXAPRO) 20 mg tablet   • fluticasone (FLONASE) 50 mcg/act nasal spray   • insulin glulisine (Apidra SoloStar) 100 units/mL injection pen   • loratadine (CLARITIN) 10 mg tablet   • LORazepam (ATIVAN) 0.5 mg tablet   • metFORMIN (GLUCOPHAGE-XR) 500 mg 24 hr tablet   • metoprolol succinate (TOPROL-XL) 100 mg 24 hr tablet   • montelukast (SINGULAIR) 10 mg tablet   • omeprazole (PriLOSEC) 40 MG capsule   • potassium chloride (Klor-Con M20) 20 mEq tablet   • torsemide (DEMADEX) 20 mg tablet   • traZODone (DESYREL) 150 mg tablet   • albuterol (2.5 mg/3 mL) 0.083 % nebulizer solution   • albuterol (PROVENTIL HFA,VENTOLIN HFA) 90 mcg/act inhaler   • Benralizumab 30 MG/ML SOAJ   • Blood Glucose Monitoring Suppl ([employer] CONTOUR NEXT MONITOR) w/Device KIT   • Contour Next Test test strip   • CVS Lancets Thin 26G MISC   • EPINEPHrine (EPIPEN) 0.3 mg/0.3 mL SOAJ   • ferrous sulfate 220 (44 Fe) mg/5 mL solution   • glycopyrrolate-formoterol (BEVESPI AEROSPHERE) 9-4.8 MCG/ACT inhaler   • Insulin Pen Needle (BD Pen Needle Love 2nd Gen) 32G X 4 MM MISC   • levalbuterol (XOPENEX) 1.25 mg/0.5 mL nebulizer solution   • naloxone (NARCAN) 4 mg/0.1 mL nasal spray   • semaglutide, 0.25 or 0.5 mg/dose, (Ozempic, 0.25 or 0.5 MG/DOSE,) 2 mg/3 mL injection pen       Labs:  Results from last 7 days   Lab Units 08/20/23  0508 08/19/23  0442 08/18/23  0451   WBC Thousand/uL 13.25* 11.74* 16.84*   HEMOGLOBIN g/dL 9.6* 9.3* 9.4*   HEMATOCRIT % 33.7* 32.5* 33.2*   PLATELETS Thousands/uL 291 315 353   NEUTROS PCT % 73 70 70   LYMPHS PCT % 16 18 18   MONOS PCT % 6 7 7   EOS PCT % 4 3 3     Results from last 7 days   Lab Units 08/20/23  0508 08/19/23  0442 08/18/23  0451   POTASSIUM mmol/L 3.6 3.3* 3.7   CHLORIDE mmol/L 95* 95* 94*   CO2 mmol/L 33* 35* 38*   BUN mg/dL 10 12 12   CREATININE mg/dL 0.50* 0.46* 0.54*   CALCIUM mg/dL 7.9* 8.0* 8.1*     Lab Results   Component Value Date    TROPONINI <0.02 06/03/2021    TROPONINI <0.02 06/03/2021    TROPONINI <0.02 06/03/2021    CKTOTAL 39 09/07/2017         Lab Results   Component Value Date    BLOODCX No Growth After 5 Days. 08/11/2023    BLOODCX No Growth After 5 Days. 08/11/2023    BLOODCX No Growth After 5 Days. 06/03/2023    SPUTUMCULTUR 2+ Growth of 05/21/2020    SPUTUMCULTUR  05/21/2020     Commensal respiratory nicholas only; No significant growth of Staph aureus/MRSA or Pseudomonas aeruginosa. Imaging:  Results for orders placed during the hospital encounter of 08/11/23    XR chest portable    Narrative  CHEST    INDICATION:   sob. COMPARISON: 8/14/2023    EXAM PERFORMED/VIEWS:  XR CHEST PORTABLE      FINDINGS:    Heart is top normal in size. Pulmonary vascular congestion and interstitial prominence is improved. No pneumothorax or pleural effusion. Osseous structures appear within normal limits for patient age. Impression  Slight improvement of pulmonary vascular congestion. Workstation performed: [id number]    Results for orders placed during the hospital encounter of 09/27/22    XR chest pa & lateral    Narrative  CHEST    INDICATION:   J44.9: Chronic obstructive pulmonary disease, unspecified  R06.02: Shortness of breath. COMPARISON:  6/5/2021    EXAM PERFORMED/VIEWS:  XR CHEST PA & LATERAL      FINDINGS:    Cardiomediastinal silhouette appears unremarkable. Mild arch calcifications again noted. Mild emphysematous changes are seen. Bilateral lower lobe hazy pulmonary infiltrates and/or subsegmental atelectasis noted. Upper to midlung fields appear adequately aerated and clear. No pneumothorax or pleural effusion. Osseous structures appear within normal limits for patient age. No free air in the upper abdomen. Impression  Mild emphysematous changes are seen. Bilateral lower lobe hazy pulmonary infiltrates and/or subsegmental atelectasis noted.   Upper to midlung fields appear adequately aerated and clear. Workstation performed: [id number]      Last 24 Hours Medication List:   Current Facility-Administered Medications   Medication Dose Route Frequency Provider Last Rate   • acetaminophen  650 mg Oral Q6H PRN Cristobal Leon PA-C     • apixaban  5 mg Oral BID Cristobal Leon PA-C     • atorvastatin  40 mg Oral Daily Cristobal Leon PA-C     • budesonide  0.5 mg Nebulization Q12H Cristobal Leon PA-C     • diphenhydrAMINE  25 mg Intravenous Q6H PRN Sherrie Hardin MD     • escitalopram  20 mg Oral Daily Cristobal Leon PA-C     • fluticasone  1 spray Nasal BID Cristobal Leon PA-C     • formoterol  20 mcg Nebulization Q12H Cristobal Leon PA-C     • furosemide  80 mg Intravenous TID (diuretic) Bryce Harrell PA-C     • insulin glargine  50 Units Subcutaneous HS Te Mahmood MD     • insulin lispro  20 Units Subcutaneous TID With Meals Te Mahmood MD     • insulin lispro  4-20 Units Subcutaneous TID AC Te Mahmood MD     • ipratropium  0.5 mg Nebulization TID Cristobal Leon PA-C     • levalbuterol  1.25 mg Nebulization TID Cristobal Leon PA-C     • loratadine  10 mg Oral Daily Cristobal Leon PA-C     • LORazepam  1 mg Oral HS Cristobal Leon PA-C     • metoprolol succinate  100 mg Oral Daily Cristobal Leon PA-C     • montelukast  10 mg Oral HS Cristobal Leon PA-C     • nystatin   Topical BID Sherrie Hardin MD     • pantoprazole  40 mg Oral Early Morning Cristobal Leon PA-C     • potassium chloride  40 mEq Oral BID Bryce Harrell PA-C     • traZODone  150 mg Oral HS Cristobal Leon PA-C          Today, Patient Was Seen By: Te Mahmood MD    ** Please Note: Dictation voice to text software may have been used in the creation of this document.  **

## 2023-08-20 NOTE — ASSESSMENT & PLAN NOTE
• Home regimen: Albuterol, Bevespi, Budesonide, Benralalizumab, Montelukast, Xopenex  • Presented to ED from home via EMS for SOB since Tuesday. Patient states she has been noncompliant with nebulizer treatments and BIPAP HS at home since being discharged on 7/26/23. She says she has been compliant with inhalers, oral medications and CPAP HS. • In ED placed on BIPAP then weaned to 12L [address] after receiving MERCER neb, 125mg IV solumedrol and 2g IV magnesium  • ABG on 6L oxygen: pH 7.3, pCO2 64.8, pO2 65.3, HCO3 33.7  • Received IV azithromycin and ceftriaxone in ED  • Afebrile, procal 0.05     Plan  • IV Solumedrol 40mg BID on admission   • Xopenex/Atrovent TID  • Pulmicort BID  • Performist BID   • Procalcitonin x2 is negative  • Will monitor off futher ABX. • Respiratory protocol  • Pulmonology consult appreciated  • Patient completed the course of prednisone.

## 2023-08-20 NOTE — ASSESSMENT & PLAN NOTE
• Chronically 4L oxygen NC  • In ED placed on BIPAP then weaned to 12L [address] after receiving MERCER neb, 125mg IV solumedrol and 2g IV magnesium  • ABG on 6L oxygen: pH 7.3, pCO2 64.8, pO2 65.3, HCO3 33.7  • Received IV azithromycin and ceftriaxone in ED     Plan  • Consult Pulmonology-appreciate recommendations, will add IV iron x 3 days  • Respiratory protocol  • Wean oxygen to baseline as able  • Patient is currently 14 pounds over recent outpatient weight, 1+ pitting edema bilateral lower extremities.     • Continue diuresis  • Patient oxygen requirements are back to baseline  • Wean oxygen as tolerated

## 2023-08-20 NOTE — ASSESSMENT & PLAN NOTE
Lab Results   Component Value Date    HGBA1C 9.9 (H) 05/11/2023       Recent Labs     08/19/23  1130 08/19/23  1609 08/19/23  2044 08/20/23  0728   POCGLU 239* 231* 304* 278*       Blood Sugar Average: Last 72 hrs:  (P) [id number]   • Home regimen: Metformin, Glargine 30 units daily at bedtime, glulisine 24 units with breakfast, 12 units with lunch and dinner     SSI; Subcutaneous Insulin Order Set  Blood Glucose checks TIDWM and QHS (Q6H for NPO patients)  Hold oral medications  Blood Glucose goal while inpatient is 140-180  Reduce basal insulin by 25-50% while inpatient  Consistent Carbohydrate Diet  -- Blood glucose levels highly elevated; likely secondary to high-dose steroids. Was on insulin drip  -- Was tapered off insulin drip. Continue on Lantus insulin and Humalog with meals and sliding scale  -Patient completed the course of prednisone.   Will continue to monitor blood sugars closely to adjust Lantus insulin

## 2023-08-20 NOTE — PROGRESS NOTES
Cardiology Progress Note - Mariano Diaz 57 y.o. female MRN: [id number]    Unit/Bed#: MS 36-1 Encounter: [id number]      Assessment:  Principal Problem:    Asthma with COPD with exacerbation ([address])  Active Problems:    Obstructive sleep apnea    Type 2 diabetes mellitus with stage 2 chronic kidney disease, with long-term current use of insulin (HCC)    Acute on chronic respiratory failure with hypoxia (HCC)    Paroxysmal atrial fibrillation (HCC)    Acute on chronic diastolic congestive heart failure (HCC)    Lactic acidosis    Class 3 severe obesity in [facility])      Plan:    1. Acute on chronic hypoxic respiratory failure - She has known COPD and is on home oxygen. She also has obstructive sleep apnea. Being treated for both a COPD exacerbation and CHF. Pulmonary following. Currently on BiPAP. 2.  Acute on chronic heart failure with a preserved ejection fraction - She also has group 2 pulmonary hypertension, with likely contributions of group 3 as well. Responding well to IV diuretics and we will continue the same dosing regimen today. Follow urine output and labs closely. 3.  Paroxysmal atrial fibrillation - In sinus rhythm on admission. Not on telemetry. Continue Toprol-XL and is on Eliquis for stroke prevention. Subjective:   Patient seen and examined. No significant events overnight. Currently resting comfortably on BiPAP. Remains volume overloaded but is responding to diuretics. Objective:     Vitals: Blood pressure (!) 115/48, pulse 70, temperature 97.8 °F (36.6 °C), resp.  rate 12, height 5' 3" (1.6 m), weight 126 kg (278 lb 14.1 oz), SpO2 98 %, not currently breastfeeding., Body mass index is 49.4 kg/m².,   Orthostatic Blood Pressures    Flowsheet Row Most Recent Value   Blood Pressure 115/48 filed at 08/20/2023 3767   Patient Position - Orthostatic VS Lying filed at 08/19/2023 6755            Intake/Output Summary (Last 24 hours) at 8/20/2023 0803  Last data filed at 8/20/2023 6009  Gross per 24 hour   Intake 1164 ml   Output 2800 ml   Net -1636 ml         Physical Exam:    GEN: Donovan Bangura appears well, alert and oriented currently on BiPAP. Obese  HEENT: pupils equal, round, and reactive to light; extraocular muscles intact  NECK: supple, no carotid bruits   HEART: regular rhythm, normal S1 and S2, no murmurs, clicks, gallops or rubs   LUNGS: Diminished bilaterally; no wheezes, rales, or rhonchi   ABDOMEN: normal bowel sounds, soft, no tenderness, no distention  EXTREMITIES: peripheral pulses normal; no clubbing, cyanosis. ++  Edema bilaterally with pitting and nonpitting components.   NEURO: no focal findings   SKIN: normal without suspicious lesions on exposed skin    Medications:      Current Facility-Administered Medications:   •  acetaminophen (TYLENOL) tablet 650 mg, 650 mg, Oral, Q6H PRN, Francine Garg PA-C, 650 mg at 08/19/23 2208  •  apixaban (ELIQUIS) tablet 5 mg, 5 mg, Oral, BID, Francine Garg PA-C, 5 mg at 08/19/23 1843  •  atorvastatin (LIPITOR) tablet 40 mg, 40 mg, Oral, Daily, Francine Garg PA-C, 40 mg at 08/19/23 0816  •  budesonide (PULMICORT) inhalation solution 0.5 mg, 0.5 mg, Nebulization, Q12H, Francine Garg PA-C, 0.5 mg at 08/20/23 0715  •  diphenhydrAMINE (BENADRYL) injection 25 mg, 25 mg, Intravenous, Q6H PRN, Mason Palencia MD, 25 mg at 08/16/23 0802  •  escitalopram (LEXAPRO) tablet 20 mg, 20 mg, Oral, Daily, Francine Garg PA-C, 20 mg at 08/19/23 0816  •  fluticasone (FLONASE) 50 mcg/act nasal spray 1 spray, 1 spray, Nasal, BID, Francine Garg PA-C, 1 spray at 08/19/23 2020  •  formoterol (PERFOROMIST) nebulizer solution 20 mcg, 20 mcg, Nebulization, Q12H, Francine Garg PA-C, 20 mcg at 08/20/23 0715  •  furosemide (LASIX) injection 80 mg, 80 mg, Intravenous, TID (diuretic), Mariella Harrell PA-C, 80 mg at 08/20/23 4244  •  insulin glargine (LANTUS) subcutaneous injection 50 Units 0.5 mL, 50 Units, Subcutaneous, , Luis Alberto S Larissa Wiggins MD, 50 Units at 08/19/23 2207  •  insulin lispro (HumaLOG) 100 units/mL subcutaneous injection 20 Units, 20 Units, Subcutaneous, TID With Meals, Shira Garza MD, 20 Units at 08/19/23 1633  •  insulin lispro (HumaLOG) 100 units/mL subcutaneous injection 4-20 Units, 4-20 Units, Subcutaneous, TID AC, 8 Units at 08/19/23 1634 **AND** [CANCELED] Fingerstick Glucose (POCT), , , TID AC, Shira Garza MD  •  ipratropium (ATROVENT) 0.02 % inhalation solution 0.5 mg, 0.5 mg, Nebulization, TID, Delvin Santiago PA-C, 0.5 mg at 08/20/23 0715  •  levalbuterol (Tasha Phy) inhalation solution 1.25 mg, 1.25 mg, Nebulization, TID, Delvin Santiago PA-C, 1.25 mg at 08/20/23 0715  •  loratadine (CLARITIN) tablet 10 mg, 10 mg, Oral, Daily, Delvin Santiago PA-C, 10 mg at 08/19/23 0558  •  LORazepam (ATIVAN) tablet 1 mg, 1 mg, Oral, HS, Delvin Santiago PA-C, 1 mg at 08/19/23 2209  •  metoprolol succinate (TOPROL-XL) 24 hr tablet 100 mg, 100 mg, Oral, Daily, Delvin Santiago PA-C, 100 mg at 08/19/23 0813  •  montelukast (SINGULAIR) tablet 10 mg, 10 mg, Oral, HS, MARIELA Craven-C, 10 mg at 08/19/23 2209  •  nystatin (MYCOSTATIN) powder, , Topical, BID, Ray Etienne MD, Given at 08/19/23 1912  •  pantoprazole (PROTONIX) EC tablet 40 mg, 40 mg, Oral, Early Morning, Delvin Santiago PA-C, 40 mg at 08/20/23 0502  •  potassium chloride (K-DUR,KLOR-CON) CR tablet 40 mEq, 40 mEq, Oral, BID, Miesha Harrell PA-C, 40 mEq at 08/19/23 1843  •  traZODone (DESYREL) tablet 150 mg, 150 mg, Oral, HS, Delvin Snatiago PA-C, 150 mg at 08/19/23 2209     Labs & Results:        Results from last 7 days   Lab Units 08/20/23  0508 08/19/23  0442 08/18/23  0451   WBC Thousand/uL 13.25* 11.74* 16.84*   HEMOGLOBIN g/dL 9.6* 9.3* 9.4*   HEMATOCRIT % 33.7* 32.5* 33.2*   PLATELETS Thousands/uL 291 315 353         Results from last 7 days   Lab Units 08/20/23  0508 08/19/23  0442 08/18/23  0451   POTASSIUM mmol/L 3.6 3.3* 3.7 CHLORIDE mmol/L 95* 95* 94*   CO2 mmol/L 33* 35* 38*   BUN mg/dL 10 12 12   CREATININE mg/dL 0.50* 0.46* 0.54*   CALCIUM mg/dL 7.9* 8.0* 8.1*         Results from last 7 days   Lab Units 08/18/23  0451 08/15/23  0447   MAGNESIUM mg/dL 2.4 2.4         EKG personally reviewed by Jackelyn James MD.  Sinus rhythm and is within normal limits. ECHO:  •  Left Ventricle: Left ventricular cavity size is normal. Wall thickness is mildly increased. The left ventricular ejection fraction is 65% by visual estimation. . Systolic function is normal. Wall motion cannot be accurately assessed. Diastolic function is moderately abnormal, consistent with grade II (pseudonormal) relaxation. •  Right Ventricle: Right ventricle is not well visualized. Right ventricular cavity size is dilated. Systolic function is normal.  •  Right Atrium: The atrium is dilated. •  Tricuspid Valve: The right ventricular systolic pressure is severely elevated. The estimated right ventricular systolic pressure is 59.15 mmHg.     PASP increased compared to prior study dated 10/04/2022 (61 mmHg vs 48 mmHg prior). Otherwise, no significant changes. Counseling / Coordination of Care  Total floor / unit time spent today 25 minutes. Greater than 50% of total time was spent with the patient and / or family counseling and / or coordination of care.

## 2023-08-20 NOTE — QUICK NOTE
Patient reports that she attempted to get out of bed to move over to her chair. Upon getting out of bed she fell forward onto her knees. Denies hitting her head or having loss of consciousness. Patient got herself up off the floor and got into her chair and then alerted nursing that she had fallen. Upon my inspection patient is resting comfortably in recliner chair. Able to bend both of her knees without difficulty or pain. No bruising noted at this time. Patient also able to make full fist with bilateral hands. No bruising or pain noted.

## 2023-08-21 ENCOUNTER — PATIENT OUTREACH (OUTPATIENT)
Dept: FAMILY MEDICINE CLINIC | Facility: HOSPITAL | Age: 63
End: 2023-08-21

## 2023-08-21 LAB
ANION GAP SERPL CALCULATED.3IONS-SCNC: 4 MMOL/L
BASOPHILS # BLD AUTO: 0.05 THOUSANDS/ÂΜL (ref 0–0.1)
BASOPHILS NFR BLD AUTO: 0 % (ref 0–1)
BUN SERPL-MCNC: 9 MG/DL (ref 5–25)
CALCIUM SERPL-MCNC: 8.1 MG/DL (ref 8.4–10.2)
CHLORIDE SERPL-SCNC: 97 MMOL/L (ref 96–108)
CO2 SERPL-SCNC: 34 MMOL/L (ref 21–32)
CREAT SERPL-MCNC: 0.49 MG/DL (ref 0.6–1.3)
EOSINOPHIL # BLD AUTO: 0.42 THOUSAND/ÂΜL (ref 0–0.61)
EOSINOPHIL NFR BLD AUTO: 4 % (ref 0–6)
ERYTHROCYTE [DISTWIDTH] IN BLOOD BY AUTOMATED COUNT: 22.9 % (ref 11.6–15.1)
GFR SERPL CREATININE-BSD FRML MDRD: 103 ML/MIN/1.73SQ M
GLUCOSE SERPL-MCNC: 146 MG/DL (ref 65–140)
GLUCOSE SERPL-MCNC: 149 MG/DL (ref 65–140)
GLUCOSE SERPL-MCNC: 210 MG/DL (ref 65–140)
GLUCOSE SERPL-MCNC: 256 MG/DL (ref 65–140)
GLUCOSE SERPL-MCNC: 294 MG/DL (ref 65–140)
GLUCOSE SERPL-MCNC: 309 MG/DL (ref 65–140)
GLUCOSE SERPL-MCNC: 323 MG/DL (ref 65–140)
HCT VFR BLD AUTO: 34.6 % (ref 34.8–46.1)
HGB BLD-MCNC: 9.8 G/DL (ref 11.5–15.4)
IMM GRANULOCYTES # BLD AUTO: 0.16 THOUSAND/UL (ref 0–0.2)
IMM GRANULOCYTES NFR BLD AUTO: 1 % (ref 0–2)
LYMPHOCYTES # BLD AUTO: 2.25 THOUSANDS/ÂΜL (ref 0.6–4.47)
LYMPHOCYTES NFR BLD AUTO: 19 % (ref 14–44)
MAGNESIUM SERPL-MCNC: 2.2 MG/DL (ref 1.9–2.7)
MCH RBC QN AUTO: 19.7 PG (ref 26.8–34.3)
MCHC RBC AUTO-ENTMCNC: 28.3 G/DL (ref 31.4–37.4)
MCV RBC AUTO: 70 FL (ref 82–98)
MONOCYTES # BLD AUTO: 0.78 THOUSAND/ÂΜL (ref 0.17–1.22)
MONOCYTES NFR BLD AUTO: 7 % (ref 4–12)
NEUTROPHILS # BLD AUTO: 8.01 THOUSANDS/ÂΜL (ref 1.85–7.62)
NEUTS SEG NFR BLD AUTO: 69 % (ref 43–75)
NRBC BLD AUTO-RTO: 0 /100 WBCS
PLATELET # BLD AUTO: 284 THOUSANDS/UL (ref 149–390)
PMV BLD AUTO: 10.4 FL (ref 8.9–12.7)
POTASSIUM SERPL-SCNC: 3.5 MMOL/L (ref 3.5–5.3)
RBC # BLD AUTO: 4.97 MILLION/UL (ref 3.81–5.12)
SODIUM SERPL-SCNC: 135 MMOL/L (ref 135–147)
WBC # BLD AUTO: 11.67 THOUSAND/UL (ref 4.31–10.16)

## 2023-08-21 PROCEDURE — 99232 SBSQ HOSP IP/OBS MODERATE 35: CPT | Performed by: INTERNAL MEDICINE

## 2023-08-21 PROCEDURE — 82948 REAGENT STRIP/BLOOD GLUCOSE: CPT

## 2023-08-21 PROCEDURE — 94760 N-INVAS EAR/PLS OXIMETRY 1: CPT

## 2023-08-21 PROCEDURE — 80048 BASIC METABOLIC PNL TOTAL CA: CPT | Performed by: INTERNAL MEDICINE

## 2023-08-21 PROCEDURE — 94660 CPAP INITIATION&MGMT: CPT

## 2023-08-21 PROCEDURE — 94640 AIRWAY INHALATION TREATMENT: CPT

## 2023-08-21 PROCEDURE — 85025 COMPLETE CBC W/AUTO DIFF WBC: CPT | Performed by: INTERNAL MEDICINE

## 2023-08-21 PROCEDURE — 83735 ASSAY OF MAGNESIUM: CPT | Performed by: INTERNAL MEDICINE

## 2023-08-21 RX ORDER — INSULIN LISPRO 100 [IU]/ML
23 INJECTION, SOLUTION INTRAVENOUS; SUBCUTANEOUS
Status: DISCONTINUED | OUTPATIENT
Start: 2023-08-21 | End: 2023-08-21

## 2023-08-21 RX ORDER — INSULIN GLARGINE 100 [IU]/ML
65 INJECTION, SOLUTION SUBCUTANEOUS
Status: DISCONTINUED | OUTPATIENT
Start: 2023-08-21 | End: 2023-08-22 | Stop reason: HOSPADM

## 2023-08-21 RX ORDER — INSULIN LISPRO 100 [IU]/ML
25 INJECTION, SOLUTION INTRAVENOUS; SUBCUTANEOUS
Status: DISCONTINUED | OUTPATIENT
Start: 2023-08-21 | End: 2023-08-22 | Stop reason: HOSPADM

## 2023-08-21 RX ORDER — FUROSEMIDE 10 MG/ML
80 INJECTION INTRAMUSCULAR; INTRAVENOUS
Status: DISCONTINUED | OUTPATIENT
Start: 2023-08-22 | End: 2023-08-22

## 2023-08-21 RX ORDER — POTASSIUM CHLORIDE 20 MEQ/1
40 TABLET, EXTENDED RELEASE ORAL ONCE
Status: COMPLETED | OUTPATIENT
Start: 2023-08-21 | End: 2023-08-21

## 2023-08-21 RX ADMIN — ATORVASTATIN CALCIUM 40 MG: 40 TABLET, FILM COATED ORAL at 08:25

## 2023-08-21 RX ADMIN — INSULIN LISPRO 20 UNITS: 100 INJECTION, SOLUTION INTRAVENOUS; SUBCUTANEOUS at 08:23

## 2023-08-21 RX ADMIN — BUDESONIDE 0.5 MG: 0.5 INHALANT ORAL at 07:08

## 2023-08-21 RX ADMIN — FUROSEMIDE 80 MG: 10 INJECTION, SOLUTION INTRAMUSCULAR; INTRAVENOUS at 12:13

## 2023-08-21 RX ADMIN — POTASSIUM CHLORIDE 40 MEQ: 1500 TABLET, EXTENDED RELEASE ORAL at 17:04

## 2023-08-21 RX ADMIN — APIXABAN 5 MG: 5 TABLET, FILM COATED ORAL at 17:04

## 2023-08-21 RX ADMIN — BUDESONIDE 0.5 MG: 0.5 INHALANT ORAL at 19:20

## 2023-08-21 RX ADMIN — FUROSEMIDE 80 MG: 10 INJECTION, SOLUTION INTRAMUSCULAR; INTRAVENOUS at 05:02

## 2023-08-21 RX ADMIN — LEVALBUTEROL HYDROCHLORIDE 1.25 MG: 1.25 SOLUTION RESPIRATORY (INHALATION) at 07:07

## 2023-08-21 RX ADMIN — INSULIN LISPRO 12 UNITS: 100 INJECTION, SOLUTION INTRAVENOUS; SUBCUTANEOUS at 12:07

## 2023-08-21 RX ADMIN — INSULIN LISPRO 25 UNITS: 100 INJECTION, SOLUTION INTRAVENOUS; SUBCUTANEOUS at 12:07

## 2023-08-21 RX ADMIN — NYSTATIN: 100000 POWDER TOPICAL at 17:04

## 2023-08-21 RX ADMIN — ESCITALOPRAM OXALATE 20 MG: 20 TABLET ORAL at 08:25

## 2023-08-21 RX ADMIN — IPRATROPIUM BROMIDE 0.5 MG: 0.5 SOLUTION RESPIRATORY (INHALATION) at 13:02

## 2023-08-21 RX ADMIN — IPRATROPIUM BROMIDE 0.5 MG: 0.5 SOLUTION RESPIRATORY (INHALATION) at 07:07

## 2023-08-21 RX ADMIN — LEVALBUTEROL HYDROCHLORIDE 1.25 MG: 1.25 SOLUTION RESPIRATORY (INHALATION) at 19:20

## 2023-08-21 RX ADMIN — MONTELUKAST 10 MG: 10 TABLET, FILM COATED ORAL at 21:50

## 2023-08-21 RX ADMIN — FORMOTEROL FUMARATE DIHYDRATE 20 MCG: 20 SOLUTION RESPIRATORY (INHALATION) at 10:16

## 2023-08-21 RX ADMIN — INSULIN LISPRO 25 UNITS: 100 INJECTION, SOLUTION INTRAVENOUS; SUBCUTANEOUS at 16:59

## 2023-08-21 RX ADMIN — NYSTATIN: 100000 POWDER TOPICAL at 08:23

## 2023-08-21 RX ADMIN — FLUTICASONE PROPIONATE 1 SPRAY: 50 SPRAY, METERED NASAL at 08:23

## 2023-08-21 RX ADMIN — INSULIN LISPRO 12 UNITS: 100 INJECTION, SOLUTION INTRAVENOUS; SUBCUTANEOUS at 08:23

## 2023-08-21 RX ADMIN — FLUTICASONE PROPIONATE 1 SPRAY: 50 SPRAY, METERED NASAL at 21:50

## 2023-08-21 RX ADMIN — METOPROLOL SUCCINATE 100 MG: 50 TABLET, EXTENDED RELEASE ORAL at 08:25

## 2023-08-21 RX ADMIN — PANTOPRAZOLE SODIUM 40 MG: 40 TABLET, DELAYED RELEASE ORAL at 05:02

## 2023-08-21 RX ADMIN — FORMOTEROL FUMARATE DIHYDRATE 20 MCG: 20 SOLUTION RESPIRATORY (INHALATION) at 19:20

## 2023-08-21 RX ADMIN — POTASSIUM CHLORIDE 40 MEQ: 1500 TABLET, EXTENDED RELEASE ORAL at 12:12

## 2023-08-21 RX ADMIN — APIXABAN 5 MG: 5 TABLET, FILM COATED ORAL at 08:25

## 2023-08-21 RX ADMIN — IPRATROPIUM BROMIDE 0.5 MG: 0.5 SOLUTION RESPIRATORY (INHALATION) at 19:20

## 2023-08-21 RX ADMIN — POTASSIUM CHLORIDE 40 MEQ: 1500 TABLET, EXTENDED RELEASE ORAL at 08:25

## 2023-08-21 RX ADMIN — ACETAMINOPHEN 325MG 650 MG: 325 TABLET ORAL at 21:52

## 2023-08-21 RX ADMIN — TRAZODONE HYDROCHLORIDE 150 MG: 150 TABLET ORAL at 21:50

## 2023-08-21 RX ADMIN — LEVALBUTEROL HYDROCHLORIDE 1.25 MG: 1.25 SOLUTION RESPIRATORY (INHALATION) at 13:02

## 2023-08-21 RX ADMIN — LORAZEPAM 1 MG: 1 TABLET ORAL at 21:50

## 2023-08-21 RX ADMIN — LORATADINE 10 MG: 10 TABLET ORAL at 08:25

## 2023-08-21 RX ADMIN — INSULIN GLARGINE 65 UNITS: 100 INJECTION, SOLUTION SUBCUTANEOUS at 21:50

## 2023-08-21 NOTE — ASSESSMENT & PLAN NOTE
Lab Results   Component Value Date    HGBA1C 9.9 (H) 05/11/2023       Recent Labs     08/20/23  2118 08/21/23  0747 08/21/23  1114 08/21/23  1402   POCGLU 387* 323* 309* 294*       Blood Sugar Average: Last 72 hrs:  (P) 257   • Home regimen: Metformin, Glargine 30 units daily at bedtime, glulisine 24 units with breakfast, 12 units with lunch and dinner     SSI; Subcutaneous Insulin Order Set  Blood Glucose checks TIDWM and QHS (Q6H for NPO patients)  Hold oral medications  Blood Glucose goal while inpatient is 140-180  Reduce basal insulin by 25-50% while inpatient  Consistent Carbohydrate Diet  -- Blood glucose levels highly elevated; likely secondary to high-dose steroids. Was on insulin drip  -- Was tapered off insulin drip. Continue on Lantus insulin and Humalog with meals and sliding scale  -Patient completed the course of prednisone.   Will continue to monitor blood sugars closely to adjust Lantus insulin

## 2023-08-21 NOTE — ASSESSMENT & PLAN NOTE
• Home regimen: Albuterol, Bevespi, Budesonide, Benralalizumab, Montelukast, Xopenex  • Presented to ED from home via EMS for SOB since Tuesday. Patient states she has been noncompliant with nebulizer treatments and BIPAP HS at home since being discharged on 7/26/23. She says she has been compliant with inhalers, oral medications and CPAP HS. • In ED placed on BIPAP then weaned to 12L [address] after receiving MERCER neb, 125mg IV solumedrol and 2g IV magnesium  • ABG on 6L oxygen: pH 7.3, pCO2 64.8, pO2 65.3, HCO3 33.7  • Received IV azithromycin and ceftriaxone in ED  • Afebrile, procal 0.05     Plan  • IV Solumedrol 40mg BID on admission   • Xopenex/Atrovent TID  • Pulmicort BID  • Performist BID   • Procalcitonin x2 is negative  • Will monitor off futher ABX. • Respiratory protocol  • Pulmonology consult appreciated  • Patient completed the course of prednisone.

## 2023-08-21 NOTE — NURSING NOTE
Provided heart failure and diabetes education to pt. Reminded pt. Importance on fluid restriction and snacks that are constantly asked for throughout nursing shifts. Pt. Acknowledged.

## 2023-08-21 NOTE — ASSESSMENT & PLAN NOTE
Wt Readings from Last 3 Encounters:   08/21/23 128 kg (282 lb 6.4 oz)   07/26/23 132 kg (291 lb 9.6 oz)   06/08/23 129 kg (284 lb)     • Patient is currently 14 pounds over her most recent outpatient weight  • Chest x-ray shows pulmonary vascular congestion  • Pitting edema present; lung field auscultation difficult given body habitus  • BNP 73  • Received 60mg IV lasix in ED  • On admission, was placed back on torsemide 60 mg twice daily p.o.  • ins and outs, daily weights     Plan  • Low-sodium diabetic diet  • Switched to Lasix 80 mg IV every 8 hours on 8/15-suspect patient may be transition to oral medications or switch to Bumex as Lasix is now being dosed at 2040 mg daily risks of side effects may outweigh benefits  • Strict I/O  • Daily weights  • Continue diuresis with Lasix as per cardiology  • Cardiology follow-up noted today.   Continue diuresis  · Trend BMP

## 2023-08-21 NOTE — PLAN OF CARE
Problem: PAIN - ADULT  Goal: Verbalizes/displays adequate comfort level or baseline comfort level  Description: Interventions:  - Encourage patient to monitor pain and request assistance  - Assess pain using appropriate pain scale  - Administer analgesics based on type and severity of pain and evaluate response  - Implement non-pharmacological measures as appropriate and evaluate response  - Consider cultural and social influences on pain and pain management  - Notify physician/advanced practitioner if interventions unsuccessful or patient reports new pain  Outcome: Progressing     Problem: INFECTION - ADULT  Goal: Absence or prevention of progression during hospitalization  Description: INTERVENTIONS:  - Assess and monitor for signs and symptoms of infection  - Monitor lab/diagnostic results  - Monitor all insertion sites, i.e. indwelling lines, tubes, and drains  - Monitor endotracheal if appropriate and nasal secretions for changes in amount and color  - [place] appropriate cooling/warming therapies per order  - Administer medications as ordered  - Instruct and encourage patient and family to use good hand hygiene technique  - Identify and instruct in appropriate isolation precautions for identified infection/condition  Outcome: Progressing     Problem: SAFETY ADULT  Goal: Patient will remain free of falls  Description: INTERVENTIONS:  - Educate patient/family on patient safety including physical limitations  - Instruct patient to call for assistance with activity   - Consult OT/PT to assist with strengthening/mobility   - Keep Call bell within reach  - Keep bed low and locked with side rails adjusted as appropriate  - Keep care items and personal belongings within reach  - Initiate and maintain comfort rounds  - Make Fall Risk Sign visible to staff  - Offer Toileting every 2 Hours, in advance of need  - Initiate/Maintain bed/ chair alarm  - Obtain necessary fall risk management equipment: n/a  - Apply yellow socks and bracelet for high fall risk patients  - Consider moving patient to room near nurses station  Outcome: Progressing  Goal: Maintain or return to baseline ADL function  Description: INTERVENTIONS:  -  Assess patient's ability to carry out ADLs; assess patient's baseline for ADL function and identify physical deficits which impact ability to perform ADLs (bathing, care of mouth/teeth, toileting, grooming, dressing, etc.)  - Assess/evaluate cause of self-care deficits   - Assess range of motion  - Assess patient's mobility; develop plan if impaired  - Assess patient's need for assistive devices and provide as appropriate  - Encourage maximum independence but intervene and supervise when necessary  - Involve family in performance of ADLs  - Assess for home care needs following discharge   - Consider OT consult to assist with ADL evaluation and planning for discharge  - Provide patient education as appropriate  Outcome: Progressing  Goal: Maintains/Returns to pre admission functional level  Description: INTERVENTIONS:  - Perform BMAT or MOVE assessment daily.   - Set and communicate daily mobility goal to care team and patient/family/caregiver. - Collaborate with rehabilitation services on mobility goals if consulted  - Perform Range of Motion 3 times a day. - Reposition patient every 2 hours.   - Dangle patient 3 times a day  - Stand patient 3 times a day  - Ambulate patient 3 times a day  - Out of bed to chair 3 times a day   - Out of bed for meals 3 times a day  - Out of bed for toileting  - Record patient progress and toleration of activity level   Outcome: Progressing     Problem: DISCHARGE PLANNING  Goal: Discharge to home or other facility with appropriate resources  Description: INTERVENTIONS:  - Identify barriers to discharge w/patient and caregiver  - Arrange for needed discharge resources and transportation as appropriate  - Identify discharge learning needs (meds, wound care, etc.)  - Arrange for interpretive services to assist at discharge as needed  - Refer to Case Management Department for coordinating discharge planning if the patient needs post-hospital services based on physician/advanced practitioner order or complex needs related to functional status, cognitive ability, or social support system  Outcome: Progressing     Problem: Knowledge Deficit  Goal: Patient/family/caregiver demonstrates understanding of disease process, treatment plan, medications, and discharge instructions  Description: Complete learning assessment and assess knowledge base.   Interventions:  - Provide teaching at level of understanding  - Provide teaching via preferred learning methods  Outcome: Progressing     Problem: RESPIRATORY - ADULT  Goal: Achieves optimal ventilation and oxygenation  Description: INTERVENTIONS:  - Assess for changes in respiratory status  - Assess for changes in mentation and behavior  - Position to facilitate oxygenation and minimize respiratory effort  - Oxygen administered by appropriate delivery if ordered  - Initiate smoking cessation education as indicated  - Encourage broncho-pulmonary hygiene including cough, deep breathe, Incentive Spirometry  - Assess the need for suctioning and aspirate as needed  - Assess and instruct to report SOB or any respiratory difficulty  - Respiratory Therapy support as indicated  Outcome: Progressing     Problem: METABOLIC, FLUID AND ELECTROLYTES - ADULT  Goal: Electrolytes maintained within normal limits  Description: INTERVENTIONS:  - Monitor labs and assess patient for signs and symptoms of electrolyte imbalances  - Administer electrolyte replacement as ordered  - Monitor response to electrolyte replacements, including repeat lab results as appropriate  - Instruct patient on fluid and nutrition as appropriate  Outcome: Progressing  Goal: Fluid balance maintained  Description: INTERVENTIONS:  - Monitor labs   - Monitor I/O and WT  - Instruct patient on fluid and nutrition as appropriate  - Assess for signs & symptoms of volume excess or deficit  Outcome: Progressing  Goal: Glucose maintained within target range  Description: INTERVENTIONS:  - Monitor Blood Glucose as ordered  - Assess for signs and symptoms of hyperglycemia and hypoglycemia  - Administer ordered medications to maintain glucose within target range  - Assess nutritional intake and initiate nutrition service referral as needed  Outcome: Progressing     Problem: Nutrition/Hydration-ADULT  Goal: Nutrient/Hydration intake appropriate for improving, restoring or maintaining nutritional needs  Description: Monitor and assess patient's nutrition/hydration status for malnutrition. Collaborate with interdisciplinary team and initiate plan and interventions as ordered. Monitor patient's weight and dietary intake as ordered or per policy. Utilize nutrition screening tool and intervene as necessary. Determine patient's food preferences and provide high-protein, high-caloric foods as appropriate.      INTERVENTIONS:  - Monitor oral intake, urinary output, labs, and treatment plans  - Assess nutrition and hydration status and recommend course of action  - Evaluate amount of meals eaten  - Assist patient with eating if necessary   - Allow adequate time for meals  - Recommend/ encourage appropriate diets, oral nutritional supplements, and vitamin/mineral supplements  - Order, calculate, and assess calorie counts as needed  - Recommend, monitor, and adjust tube feedings and TPN/PPN based on assessed needs  - Assess need for intravenous fluids  - Provide specific nutrition/hydration education as appropriate  - Include patient/family/caregiver in decisions related to nutrition  Outcome: Progressing     Problem: CARDIOVASCULAR - ADULT  Goal: Maintains optimal cardiac output and hemodynamic stability  Description: INTERVENTIONS:  - Monitor I/O, vital signs and rhythm  - Monitor for S/S and trends of decreased cardiac output  - Administer and titrate ordered vasoactive medications to optimize hemodynamic stability  - Assess quality of pulses, skin color and temperature  - Assess for signs of decreased coronary artery perfusion  - Instruct patient to report change in severity of symptoms  Outcome: Progressing     Problem: GENITOURINARY - ADULT  Goal: Maintains or returns to baseline urinary function  Description: INTERVENTIONS:  - Assess urinary function  - Encourage oral fluids to ensure adequate hydration if ordered  - Administer IV fluids as ordered to ensure adequate hydration  - Administer ordered medications as needed  - Offer frequent toileting  - Follow urinary retention protocol if ordered  Outcome: Progressing     Problem: SKIN/TISSUE INTEGRITY - ADULT  Goal: Skin Integrity remains intact(Skin Breakdown Prevention)  Description: Assess:  -Perform Jt assessment every shift  -Clean and moisturize skin every shift  -Inspect skin when repositioning, toileting, and assisting with ADLS  -Assess under medical devices such as nasal Cannula every shift  -Assess extremities for adequate circulation and sensation     Bed Management:  -Have minimal linens on bed & keep smooth, unwrinkled  -Change linens as needed when moist or perspiring  -Avoid sitting or lying in one position for more than 2 hours while in bed  -Keep HOB at 30 degrees     Toileting:  -Offer bedside commode  -Assess for incontinence every shift  -Use incontinent care products after each incontinent episode such as moisture barrier    Activity:  -Mobilize patient 3 times a day  -Encourage activity and walks on unit  -Encourage or provide ROM exercises   -Turn and reposition patient every 2 Hours  -Use appropriate equipment to lift or move patient in bed  -Instruct/ Assist with weight shifting every 15 minutes when out of bed in chair  -Consider limitation of chair time 4 hour intervals    Skin Care:  -Avoid use of baby powder, tape, friction and shearing, hot water or constrictive clothing  -Relieve pressure over bony prominences using wedge  -Do not massage red bony areas    Next Steps:  -Teach patient strategies to minimize risks such as pneumonia  -Consider consults to  interdisciplinary teams such as wound care  Outcome: Progressing     Problem: HEMATOLOGIC - ADULT  Goal: Maintains hematologic stability  Description: INTERVENTIONS  - Assess for signs and symptoms of bleeding or hemorrhage  - Monitor labs  - Administer supportive blood products/factors as ordered and appropriate  Outcome: Progressing     Problem: MUSCULOSKELETAL - ADULT  Goal: Maintain or return mobility to safest level of function  Description: INTERVENTIONS:  - Assess patient's ability to carry out ADLs; assess patient's baseline for ADL function and identify physical deficits which impact ability to perform ADLs (bathing, care of mouth/teeth, toileting, grooming, dressing, etc.)  - Assess/evaluate cause of self-care deficits   - Assess range of motion  - Assess patient's mobility  - Assess patient's need for assistive devices and provide as appropriate  - Encourage maximum independence but intervene and supervise when necessary  - Involve family in performance of ADLs  - Assess for home care needs following discharge   - Consider OT consult to assist with ADL evaluation and planning for discharge  - Provide patient education as appropriate  Outcome: Progressing     Problem: Prexisting or High Potential for Compromised Skin Integrity  Goal: Skin integrity is maintained or improved  Description: INTERVENTIONS:  - Identify patients at risk for skin breakdown  - Assess and monitor skin integrity  - Assess and monitor nutrition and hydration status  - Monitor labs   - Assess for incontinence   - Turn and reposition patient  - Assist with mobility/ambulation  - Relieve pressure over bony prominences  - Avoid friction and shearing  - Provide appropriate hygiene as needed including keeping skin clean and dry  - Evaluate need for skin moisturizer/barrier cream  - Collaborate with interdisciplinary team   - Patient/family teaching  - Consider wound care consult   Outcome: Progressing     Problem: Potential for Falls  Goal: Patient will remain free of falls  Description: INTERVENTIONS:  - Educate patient/family on patient safety including physical limitations  - Instruct patient to call for assistance with activity   - Consult OT/PT to assist with strengthening/mobility   - Keep Call bell within reach  - Keep bed low and locked with side rails adjusted as appropriate  - Keep care items and personal belongings within reach  - Initiate and maintain comfort rounds  - Make Fall Risk Sign visible to staff  - Offer Toileting every 2 Hours, in advance of need  - Initiate/Maintain bed/ chair alarm  - Obtain necessary fall risk management equipment: n/a  - Apply yellow socks and bracelet for high fall risk patients  - Consider moving patient to room near nurses station  Outcome: Progressing     Problem: MOBILITY - ADULT  Goal: Maintain or return to baseline ADL function  Description: INTERVENTIONS:  -  Assess patient's ability to carry out ADLs; assess patient's baseline for ADL function and identify physical deficits which impact ability to perform ADLs (bathing, care of mouth/teeth, toileting, grooming, dressing, etc.)  - Assess/evaluate cause of self-care deficits   - Assess range of motion  - Assess patient's mobility; develop plan if impaired  - Assess patient's need for assistive devices and provide as appropriate  - Encourage maximum independence but intervene and supervise when necessary  - Involve family in performance of ADLs  - Assess for home care needs following discharge   - Consider OT consult to assist with ADL evaluation and planning for discharge  - Provide patient education as appropriate  Outcome: Progressing  Goal: Maintains/Returns to pre admission functional level  Description: INTERVENTIONS:  - Perform BMAT or MOVE assessment daily.   - Set and communicate daily mobility goal to care team and patient/family/caregiver. - Collaborate with rehabilitation services on mobility goals if consulted  - Perform Range of Motion 3 times a day. - Reposition patient every 2 hours.   - Dangle patient 3 times a day  - Stand patient 3 times a day  - Ambulate patient 3 times a day  - Out of bed to chair 3 times a day   - Out of bed for meals 3 times a day  - Out of bed for toileting  - Record patient progress and toleration of activity level   Outcome: Progressing

## 2023-08-21 NOTE — PROGRESS NOTES
TRES CUELLAR attempted to call patient ([phone]) a second time. However, patient is currently in the hospital. TRES CUELLAR will place patient on SW surveillance and follow up with patient when she is discharged. TRES CUELLAR discussed above with RN CM on care team. TRES CUELLAR will continue to follow up regarding.

## 2023-08-21 NOTE — PROGRESS NOTES
Progress Note - Pulmonary   Mattie Fitzgerald 57 y.o. female MRN: [id number]  Unit/Bed#: -01 Encounter: [id number]    Assessment & Plan:  Acute on chronic hypoxic respiratory failure  Severe COPD with acute exacerbation  Diastolic heart failure with vascular congestion  WILMA on BiPAP  Severe pulmonary hypertension  Iron deficiency anemia  A-fib on Eliquis  Morbid obesity    · Patient now on her baseline 4 L. Maintain pulse ox greater than 88%. Recommend ambulatory pulse ox prior to discharge to determine how much O2 she needs with exertion  · Completed course of steroids  · Continue Pulmicort and Perforomist nebs twice daily, Xopenex and Atrovent scheduled  · Outpatient pulmonary follow-up including [facility]  · Resume her home regimen - Bevespi, Pulmicort nebs, albuterol MDI and nebs PRN at d/c  · Continue diuretics per Cardiology/primary. Please call Pulmonary if any questions prior to d/c    Subjective:   Esmer Ayon says her breathing continues to improve. No new complaints    Objective:     Vitals: Blood pressure 116/57, pulse 72, temperature 98.6 °F (37 °C), resp. rate 18, height 5' 3" (1.6 m), weight 128 kg (282 lb 6.4 oz), SpO2 97 %, not currently breastfeeding. ,Body mass index is 50.02 kg/m². Intake/Output Summary (Last 24 hours) at 8/21/2023 1130  Last data filed at 8/21/2023 0836  Gross per 24 hour   Intake 1602 ml   Output 1200 ml   Net 402 ml       Invasive Devices     Peripheral Intravenous Line  Duration           Peripheral IV 08/19/23 Dorsal (posterior); Left Forearm 1 day                Physical Exam:  General appearance: Alert and oriented, in no acute distress  Head: Normocephalic, without obvious abnormality, atraumatic  Eyes: EOMI. No discharge bilaterally. No scleral icterus.    Neck: Supple, symmetrical, trachea midline  Lungs: Decreased breath sounds  Heart: Regular rate  Abdomen:  No appreciable distension or tenderness  Extremities: No tenderness  Skin: Warm and dry  Neurologic: No acute focal deficits are noted    Labs: I have personally reviewed pertinent lab results. Imaging and other studies: I have personally reviewed pertinent reports.

## 2023-08-21 NOTE — PLAN OF CARE
Problem: PAIN - ADULT  Goal: Verbalizes/displays adequate comfort level or baseline comfort level  Description: Interventions:  - Encourage patient to monitor pain and request assistance  - Assess pain using appropriate pain scale  - Administer analgesics based on type and severity of pain and evaluate response  - Implement non-pharmacological measures as appropriate and evaluate response  - Consider cultural and social influences on pain and pain management  - Notify physician/advanced practitioner if interventions unsuccessful or patient reports new pain  Outcome: Progressing     Problem: INFECTION - ADULT  Goal: Absence or prevention of progression during hospitalization  Description: INTERVENTIONS:  - Assess and monitor for signs and symptoms of infection  - Monitor lab/diagnostic results  - Monitor all insertion sites, i.e. indwelling lines, tubes, and drains  - Monitor endotracheal if appropriate and nasal secretions for changes in amount and color  - [place] appropriate cooling/warming therapies per order  - Administer medications as ordered  - Instruct and encourage patient and family to use good hand hygiene technique  - Identify and instruct in appropriate isolation precautions for identified infection/condition  Outcome: Progressing     Problem: SAFETY ADULT  Goal: Patient will remain free of falls  Description: INTERVENTIONS:  - Educate patient/family on patient safety including physical limitations  - Instruct patient to call for assistance with activity   - Consult OT/PT to assist with strengthening/mobility   - Keep Call bell within reach  - Keep bed low and locked with side rails adjusted as appropriate  - Keep care items and personal belongings within reach  - Initiate and maintain comfort rounds  - Make Fall Risk Sign visible to staff  - Offer Toileting every 2 Hours, in advance of need  - Initiate/Maintain bed/ chair alarm  - Obtain necessary fall risk management equipment: n/a  - Apply yellow socks and bracelet for high fall risk patients  - Consider moving patient to room near nurses station  Outcome: Progressing  Goal: Maintain or return to baseline ADL function  Description: INTERVENTIONS:  -  Assess patient's ability to carry out ADLs; assess patient's baseline for ADL function and identify physical deficits which impact ability to perform ADLs (bathing, care of mouth/teeth, toileting, grooming, dressing, etc.)  - Assess/evaluate cause of self-care deficits   - Assess range of motion  - Assess patient's mobility; develop plan if impaired  - Assess patient's need for assistive devices and provide as appropriate  - Encourage maximum independence but intervene and supervise when necessary  - Involve family in performance of ADLs  - Assess for home care needs following discharge   - Consider OT consult to assist with ADL evaluation and planning for discharge  - Provide patient education as appropriate  Outcome: Progressing  Goal: Maintains/Returns to pre admission functional level  Description: INTERVENTIONS:  - Perform BMAT or MOVE assessment daily.   - Set and communicate daily mobility goal to care team and patient/family/caregiver. - Collaborate with rehabilitation services on mobility goals if consulted  - Perform Range of Motion 3 times a day. - Reposition patient every 2 hours.   - Dangle patient 3 times a day  - Stand patient 3 times a day  - Ambulate patient 3 times a day  - Out of bed to chair 3 times a day   - Out of bed for meals 3 times a day  - Out of bed for toileting  - Record patient progress and toleration of activity level   Outcome: Progressing     Problem: DISCHARGE PLANNING  Goal: Discharge to home or other facility with appropriate resources  Description: INTERVENTIONS:  - Identify barriers to discharge w/patient and caregiver  - Arrange for needed discharge resources and transportation as appropriate  - Identify discharge learning needs (meds, wound care, etc.)  - Arrange for interpretive services to assist at discharge as needed  - Refer to Case Management Department for coordinating discharge planning if the patient needs post-hospital services based on physician/advanced practitioner order or complex needs related to functional status, cognitive ability, or social support system  Outcome: Progressing     Problem: Knowledge Deficit  Goal: Patient/family/caregiver demonstrates understanding of disease process, treatment plan, medications, and discharge instructions  Description: Complete learning assessment and assess knowledge base.   Interventions:  - Provide teaching at level of understanding  - Provide teaching via preferred learning methods  Outcome: Progressing     Problem: RESPIRATORY - ADULT  Goal: Achieves optimal ventilation and oxygenation  Description: INTERVENTIONS:  - Assess for changes in respiratory status  - Assess for changes in mentation and behavior  - Position to facilitate oxygenation and minimize respiratory effort  - Oxygen administered by appropriate delivery if ordered  - Initiate smoking cessation education as indicated  - Encourage broncho-pulmonary hygiene including cough, deep breathe, Incentive Spirometry  - Assess the need for suctioning and aspirate as needed  - Assess and instruct to report SOB or any respiratory difficulty  - Respiratory Therapy support as indicated  Outcome: Progressing     Problem: METABOLIC, FLUID AND ELECTROLYTES - ADULT  Goal: Electrolytes maintained within normal limits  Description: INTERVENTIONS:  - Monitor labs and assess patient for signs and symptoms of electrolyte imbalances  - Administer electrolyte replacement as ordered  - Monitor response to electrolyte replacements, including repeat lab results as appropriate  - Instruct patient on fluid and nutrition as appropriate  Outcome: Progressing  Goal: Fluid balance maintained  Description: INTERVENTIONS:  - Monitor labs   - Monitor I/O and WT  - Instruct patient on fluid and nutrition as appropriate  - Assess for signs & symptoms of volume excess or deficit  Outcome: Progressing  Goal: Glucose maintained within target range  Description: INTERVENTIONS:  - Monitor Blood Glucose as ordered  - Assess for signs and symptoms of hyperglycemia and hypoglycemia  - Administer ordered medications to maintain glucose within target range  - Assess nutritional intake and initiate nutrition service referral as needed  Outcome: Progressing     Problem: Nutrition/Hydration-ADULT  Goal: Nutrient/Hydration intake appropriate for improving, restoring or maintaining nutritional needs  Description: Monitor and assess patient's nutrition/hydration status for malnutrition. Collaborate with interdisciplinary team and initiate plan and interventions as ordered. Monitor patient's weight and dietary intake as ordered or per policy. Utilize nutrition screening tool and intervene as necessary. Determine patient's food preferences and provide high-protein, high-caloric foods as appropriate.      INTERVENTIONS:  - Monitor oral intake, urinary output, labs, and treatment plans  - Assess nutrition and hydration status and recommend course of action  - Evaluate amount of meals eaten  - Assist patient with eating if necessary   - Allow adequate time for meals  - Recommend/ encourage appropriate diets, oral nutritional supplements, and vitamin/mineral supplements  - Order, calculate, and assess calorie counts as needed  - Recommend, monitor, and adjust tube feedings and TPN/PPN based on assessed needs  - Assess need for intravenous fluids  - Provide specific nutrition/hydration education as appropriate  - Include patient/family/caregiver in decisions related to nutrition  Outcome: Progressing     Problem: CARDIOVASCULAR - ADULT  Goal: Maintains optimal cardiac output and hemodynamic stability  Description: INTERVENTIONS:  - Monitor I/O, vital signs and rhythm  - Monitor for S/S and trends of decreased cardiac output  - Administer and titrate ordered vasoactive medications to optimize hemodynamic stability  - Assess quality of pulses, skin color and temperature  - Assess for signs of decreased coronary artery perfusion  - Instruct patient to report change in severity of symptoms  Outcome: Progressing     Problem: GENITOURINARY - ADULT  Goal: Maintains or returns to baseline urinary function  Description: INTERVENTIONS:  - Assess urinary function  - Encourage oral fluids to ensure adequate hydration if ordered  - Administer IV fluids as ordered to ensure adequate hydration  - Administer ordered medications as needed  - Offer frequent toileting  - Follow urinary retention protocol if ordered  Outcome: Progressing     Problem: SKIN/TISSUE INTEGRITY - ADULT  Goal: Skin Integrity remains intact(Skin Breakdown Prevention)  Description: Assess:  -Perform Jt assessment every shift  -Clean and moisturize skin every shift  -Inspect skin when repositioning, toileting, and assisting with ADLS  -Assess under medical devices such as nasal Cannula every shift  -Assess extremities for adequate circulation and sensation     Bed Management:  -Have minimal linens on bed & keep smooth, unwrinkled  -Change linens as needed when moist or perspiring  -Avoid sitting or lying in one position for more than 2 hours while in bed  -Keep HOB at 30 degrees     Toileting:  -Offer bedside commode  -Assess for incontinence every shift  -Use incontinent care products after each incontinent episode such as moisture barrier    Activity:  -Mobilize patient 3 times a day  -Encourage activity and walks on unit  -Encourage or provide ROM exercises   -Turn and reposition patient every 2 Hours  -Use appropriate equipment to lift or move patient in bed  -Instruct/ Assist with weight shifting every 15 minutes when out of bed in chair  -Consider limitation of chair time 4 hour intervals    Skin Care:  -Avoid use of baby powder, tape, friction and shearing, hot water or constrictive clothing  -Relieve pressure over bony prominences using wedge  -Do not massage red bony areas    Next Steps:  -Teach patient strategies to minimize risks such as pneumonia  -Consider consults to  interdisciplinary teams such as wound care  Outcome: Progressing     Problem: HEMATOLOGIC - ADULT  Goal: Maintains hematologic stability  Description: INTERVENTIONS  - Assess for signs and symptoms of bleeding or hemorrhage  - Monitor labs  - Administer supportive blood products/factors as ordered and appropriate  Outcome: Progressing     Problem: MUSCULOSKELETAL - ADULT  Goal: Maintain or return mobility to safest level of function  Description: INTERVENTIONS:  - Assess patient's ability to carry out ADLs; assess patient's baseline for ADL function and identify physical deficits which impact ability to perform ADLs (bathing, care of mouth/teeth, toileting, grooming, dressing, etc.)  - Assess/evaluate cause of self-care deficits   - Assess range of motion  - Assess patient's mobility  - Assess patient's need for assistive devices and provide as appropriate  - Encourage maximum independence but intervene and supervise when necessary  - Involve family in performance of ADLs  - Assess for home care needs following discharge   - Consider OT consult to assist with ADL evaluation and planning for discharge  - Provide patient education as appropriate  Outcome: Progressing     Problem: Prexisting or High Potential for Compromised Skin Integrity  Goal: Skin integrity is maintained or improved  Description: INTERVENTIONS:  - Identify patients at risk for skin breakdown  - Assess and monitor skin integrity  - Assess and monitor nutrition and hydration status  - Monitor labs   - Assess for incontinence   - Turn and reposition patient  - Assist with mobility/ambulation  - Relieve pressure over bony prominences  - Avoid friction and shearing  - Provide appropriate hygiene as needed including keeping skin clean and dry  - Evaluate need for skin moisturizer/barrier cream  - Collaborate with interdisciplinary team   - Patient/family teaching  - Consider wound care consult   Outcome: Progressing     Problem: Potential for Falls  Goal: Patient will remain free of falls  Description: INTERVENTIONS:  - Educate patient/family on patient safety including physical limitations  - Instruct patient to call for assistance with activity   - Consult OT/PT to assist with strengthening/mobility   - Keep Call bell within reach  - Keep bed low and locked with side rails adjusted as appropriate  - Keep care items and personal belongings within reach  - Initiate and maintain comfort rounds  - Make Fall Risk Sign visible to staff  - Offer Toileting every 2 Hours, in advance of need  - Initiate/Maintain bed/ chair alarm  - Obtain necessary fall risk management equipment: n/a  - Apply yellow socks and bracelet for high fall risk patients  - Consider moving patient to room near nurses station  Outcome: Progressing     Problem: MOBILITY - ADULT  Goal: Maintain or return to baseline ADL function  Description: INTERVENTIONS:  -  Assess patient's ability to carry out ADLs; assess patient's baseline for ADL function and identify physical deficits which impact ability to perform ADLs (bathing, care of mouth/teeth, toileting, grooming, dressing, etc.)  - Assess/evaluate cause of self-care deficits   - Assess range of motion  - Assess patient's mobility; develop plan if impaired  - Assess patient's need for assistive devices and provide as appropriate  - Encourage maximum independence but intervene and supervise when necessary  - Involve family in performance of ADLs  - Assess for home care needs following discharge   - Consider OT consult to assist with ADL evaluation and planning for discharge  - Provide patient education as appropriate  Outcome: Progressing  Goal: Maintains/Returns to pre admission functional level  Description: INTERVENTIONS:  - Perform BMAT or MOVE assessment daily.   - Set and communicate daily mobility goal to care team and patient/family/caregiver. - Collaborate with rehabilitation services on mobility goals if consulted  - Perform Range of Motion 3 times a day. - Reposition patient every 2 hours.   - Dangle patient 3 times a day  - Stand patient 3 times a day  - Ambulate patient 3 times a day  - Out of bed to chair 3 times a day   - Out of bed for meals 3 times a day  - Out of bed for toileting  - Record patient progress and toleration of activity level   Outcome: Progressing

## 2023-08-21 NOTE — ASSESSMENT & PLAN NOTE
• Chronically 4L oxygen NC  • In ED placed on BIPAP then weaned to 12L [address] after receiving MERCER neb, 125mg IV solumedrol and 2g IV magnesium  • ABG on 6L oxygen: pH 7.3, pCO2 64.8, pO2 65.3, HCO3 33.7  • Received IV azithromycin and ceftriaxone in ED     Plan  • Consult Pulmonology-appreciate recommendations, will add IV iron x 3 days  • Respiratory protocol  • Wean oxygen to baseline as able  • Patient is currently 14 pounds over recent outpatient weight, 1+ pitting edema bilateral lower extremities.     • Continue diuresis  • Patient oxygen requirements are back to baseline  • Wean oxygen as tolerated

## 2023-08-21 NOTE — PROGRESS NOTES
[address] [facility]  Progress Note  Name: Tena Francisco  MRN: [id number]  Unit/Bed#: -01 I Date of Admission: 8/11/2023   Date of Service: 8/21/2023 I Hospital Day: 10    Assessment/Plan   Class 3 severe obesity in adult [facility])  Assessment & Plan  • Encourage weight loss and lifestyle changes    Lactic acidosis  Assessment & Plan  • LA 2.4->2.6  • Trend       Acute on chronic diastolic congestive heart failure (HCC)  Assessment & Plan  Wt Readings from Last 3 Encounters:   08/21/23 128 kg (282 lb 6.4 oz)   07/26/23 132 kg (291 lb 9.6 oz)   06/08/23 129 kg (284 lb)     • Patient is currently 14 pounds over her most recent outpatient weight  • Chest x-ray shows pulmonary vascular congestion  • Pitting edema present; lung field auscultation difficult given body habitus  • BNP 73  • Received 60mg IV lasix in ED  • On admission, was placed back on torsemide 60 mg twice daily p.o.  • ins and outs, daily weights     Plan  • Low-sodium diabetic diet  • Switched to Lasix 80 mg IV every 8 hours on 8/15-suspect patient may be transition to oral medications or switch to Bumex as Lasix is now being dosed at 2040 mg daily risks of side effects may outweigh benefits  • Strict I/O  • Daily weights  • Continue diuresis with Lasix as per cardiology  • Cardiology follow-up noted today.   Continue diuresis  · Trend BMP    Paroxysmal atrial fibrillation (HCC)  Assessment & Plan  • Home regimen: Eliquis, metoprolol  • Currently SR in hospital     Plan  • Continue Eliquis and metoprol    Acute on chronic respiratory failure with hypoxia (HCC)  Assessment & Plan  • Chronically 4L oxygen NC  • In ED placed on BIPAP then weaned to 12L [address] after receiving MERCER neb, 125mg IV solumedrol and 2g IV magnesium  • ABG on 6L oxygen: pH 7.3, pCO2 64.8, pO2 65.3, HCO3 33.7  • Received IV azithromycin and ceftriaxone in ED     Plan  • Consult Pulmonology-appreciate recommendations, will add IV iron x 3 days  • Respiratory protocol  • Wean oxygen to baseline as able  • Patient is currently 14 pounds over recent outpatient weight, 1+ pitting edema bilateral lower extremities. • Continue diuresis  • Patient oxygen requirements are back to baseline  • Wean oxygen as tolerated    Type 2 diabetes mellitus with stage 2 chronic kidney disease, with long-term current use of insulin [facility])  Assessment & Plan  Lab Results   Component Value Date    HGBA1C 9.9 (H) 05/11/2023       Recent Labs     08/20/23  2118 08/21/23  0747 08/21/23  1114 08/21/23  1402   POCGLU 387* 323* 309* 294*       Blood Sugar Average: Last 72 hrs:  (P) 257   • Home regimen: Metformin, Glargine 30 units daily at bedtime, glulisine 24 units with breakfast, 12 units with lunch and dinner     SSI; Subcutaneous Insulin Order Set  Blood Glucose checks TIDWM and QHS (Q6H for NPO patients)  Hold oral medications  Blood Glucose goal while inpatient is 140-180  Reduce basal insulin by 25-50% while inpatient  Consistent Carbohydrate Diet  -- Blood glucose levels highly elevated; likely secondary to high-dose steroids. Was on insulin drip  -- Was tapered off insulin drip. Continue on Lantus insulin and Humalog with meals and sliding scale  -Patient completed the course of prednisone. Will continue to monitor blood sugars closely to adjust Lantus insulin    Obstructive sleep apnea  Assessment & Plan  • Discharged 7/26 with Rx for nocturnal BIPAP  • Patient states she is noncompliant with BIPAP due to personal preference at home and continues to wear CPAP      Plan  • Tolerated BIPAP in hospital -->continue BIPAP HS    * Asthma with COPD with exacerbation ([address])  Assessment & Plan  • Home regimen: Albuterol, Bevespi, Budesonide, Benralalizumab, Montelukast, Xopenex  • Presented to ED from home via EMS for SOB since Tuesday. Patient states she has been noncompliant with nebulizer treatments and BIPAP HS at home since being discharged on 7/26/23.   She says she has been compliant with inhalers, oral medications and CPAP HS. • In ED placed on BIPAP then weaned to 12L [address] after receiving MERCER neb, 125mg IV solumedrol and 2g IV magnesium  • ABG on 6L oxygen: pH 7.3, pCO2 64.8, pO2 65.3, HCO3 33.7  • Received IV azithromycin and ceftriaxone in ED  • Afebrile, procal 0.05     Plan  • IV Solumedrol 40mg BID on admission   • Xopenex/Atrovent TID  • Pulmicort BID  • Performist BID   • Procalcitonin x2 is negative  • Will monitor off futher ABX. • Respiratory protocol  • Pulmonology consult appreciated  • Patient completed the course of prednisone. VTE Pharmacologic Prophylaxis: VTE Score: 5 High Risk (Score >/= 5) - Pharmacological DVT Prophylaxis Ordered: apixaban (Eliquis). Sequential Compression Devices Ordered. Patient Centered Rounds: I performed bedside rounds with nursing staff today. Discussions with Specialists or Other Care Team Provider: pulm    Education and Discussions with Family / Patient: Patient declined call to . Total Time Spent on Date of Encounter in care of patient: 35 minutes This time was spent on one or more of the following: performing physical exam; counseling and coordination of care; obtaining or reviewing history; documenting in the medical record; reviewing/ordering tests, medications or procedures; communicating with other healthcare professionals and discussing with patient's family/caregivers. Current Length of Stay: 10 day(s)  Current Patient Status: Inpatient   Certification Statement: The patient will continue to require additional inpatient hospital stay due to IV diuretics  Discharge Plan: Anticipate discharge in 48 hrs to home.     Code Status: Level 1 - Full Code    Subjective:   Patient denies any acute complaints shortness of breath    Objective:     Vitals:   Temp (24hrs), Av.3 °F (36.8 °C), Min:97.9 °F (36.6 °C), Max:98.6 °F (37 °C)    Temp:  [97.9 °F (36.6 °C)-98.6 °F (37 °C)] 98.6 °F (37 °C)  HR:  [72-79] 72  Resp:  [16-18] 18  BP: ()/(48-69) 116/57  SpO2:  [94 %-98 %] 97 %  Body mass index is 50.02 kg/m². Input and Output Summary (last 24 hours): Intake/Output Summary (Last 24 hours) at 8/21/2023 1453  Last data filed at 8/21/2023 1254  Gross per 24 hour   Intake 882 ml   Output 800 ml   Net 82 ml       Physical Exam:   Physical Exam  Vitals and nursing note reviewed. Constitutional:       General: She is not in acute distress. Appearance: She is well-developed. She is not toxic-appearing or diaphoretic. HENT:      Head: Normocephalic and atraumatic. Eyes:      General: No scleral icterus. Conjunctiva/sclera: Conjunctivae normal.   Cardiovascular:      Rate and Rhythm: Normal rate and regular rhythm. Heart sounds: No murmur heard. No friction rub. No gallop. Pulmonary:      Effort: Pulmonary effort is normal. No respiratory distress. Breath sounds: Normal breath sounds. No stridor. No wheezing, rhonchi or rales. Chest:      Chest wall: No tenderness. Abdominal:      General: There is no distension. Palpations: Abdomen is soft. There is no mass. Tenderness: There is no abdominal tenderness. There is no guarding or rebound. Hernia: No hernia is present. Musculoskeletal:         General: No swelling or tenderness. Cervical back: Neck supple. Right lower leg: Edema present. Left lower leg: Edema present. Skin:     General: Skin is warm and dry. Capillary Refill: Capillary refill takes less than 2 seconds. Neurological:      Mental Status: She is alert and oriented to person, place, and time.    Psychiatric:         Mood and Affect: Mood normal.          Additional Data:     Labs:  Results from last 7 days   Lab Units 08/21/23  0457   WBC Thousand/uL 11.67*   HEMOGLOBIN g/dL 9.8*   HEMATOCRIT % 34.6*   PLATELETS Thousands/uL 284   NEUTROS PCT % 69   LYMPHS PCT % 19   MONOS PCT % 7   EOS PCT % 4     Results from last 7 days   Lab Units 08/21/23  0457   SODIUM mmol/L 135   POTASSIUM mmol/L 3.5   CHLORIDE mmol/L 97   CO2 mmol/L 34*   BUN mg/dL 9   CREATININE mg/dL 0.49*   ANION GAP mmol/L 4   CALCIUM mg/dL 8.1*   GLUCOSE RANDOM mg/dL 210*         Results from last 7 days   Lab Units 08/21/23  1402 08/21/23  1114 08/21/23  0747 08/20/23  2118 08/20/23  1641 08/20/23  1127 08/20/23  0728 08/19/23  2044 08/19/23  1609 08/19/23  1130 08/19/23  0748 08/18/23  2034   POC GLUCOSE mg/dl 294* 309* 323* 387* 265* 214* 278* 304* 231* 239* 186* 231*               Lines/Drains:  Invasive Devices     Peripheral Intravenous Line  Duration           Peripheral IV 08/19/23 Dorsal (posterior); Left Forearm 1 day                      Imaging: No pertinent imaging reviewed.     Recent Cultures (last 7 days):         Last 24 Hours Medication List:   Current Facility-Administered Medications   Medication Dose Route Frequency Provider Last Rate   • acetaminophen  650 mg Oral Q6H PRN Chelly Wong PA-C     • apixaban  5 mg Oral BID Chelly Wong PA-C     • atorvastatin  40 mg Oral Daily Chelly Wong PA-C     • budesonide  0.5 mg Nebulization Q12H Chelly Wong PA-C     • diphenhydrAMINE  25 mg Intravenous Q6H PRN Scarlet Brito MD     • escitalopram  20 mg Oral Daily Chelly Wong PA-C     • fluticasone  1 spray Nasal BID Chelly Wong PA-C     • formoterol  20 mcg Nebulization Q12H Chelly Wong PA-C     • furosemide  80 mg Intravenous TID (diuretic) [facility] ROSALIO Harrell     • insulin glargine  65 Units Subcutaneous HS Johnny Birmingham DO     • insulin lispro  25 Units Subcutaneous TID With Meals Johnny Birmingham DO     • insulin lispro  4-20 Units Subcutaneous TID ORIN Rogers MD     • ipratropium  0.5 mg Nebulization TID Chelly Wong PA-C     • levalbuterol  1.25 mg Nebulization TID Chelly Wong PA-C     • loratadine  10 mg Oral Daily Chelly Wong PA-C     • LORazepam  1 mg Oral HS Chelly Wong PA-C     • metoprolol succinate  100 mg Oral Daily Sachi Balbuena PA-C     • montelukast  10 mg Oral HS Sachi Balbuena PA-C     • nystatin   Topical BID Sameer Rahman MD     • pantoprazole  40 mg Oral Early Morning Sachi Balbuena PA-C     • potassium chloride  40 mEq Oral BID Marino Harrell PA-C     • traZODone  150 mg Oral HS Sachi Balbuena PA-C          Today, Patient Was Seen By: Jose Short DO    **Please Note: This note may have been constructed using a voice recognition system. **

## 2023-08-22 ENCOUNTER — TRANSITIONAL CARE MANAGEMENT (OUTPATIENT)
Dept: FAMILY MEDICINE CLINIC | Facility: HOSPITAL | Age: 63
End: 2023-08-22

## 2023-08-22 VITALS
TEMPERATURE: 97.9 F | OXYGEN SATURATION: 97 % | SYSTOLIC BLOOD PRESSURE: 112 MMHG | HEIGHT: 63 IN | RESPIRATION RATE: 18 BRPM | WEIGHT: 278.44 LBS | DIASTOLIC BLOOD PRESSURE: 51 MMHG | HEART RATE: 74 BPM | BODY MASS INDEX: 49.34 KG/M2

## 2023-08-22 PROBLEM — J44.1 COPD EXACERBATION (HCC): Status: ACTIVE | Noted: 2020-09-16

## 2023-08-22 LAB
ANION GAP SERPL CALCULATED.3IONS-SCNC: 7 MMOL/L
BASOPHILS # BLD AUTO: 0.07 THOUSANDS/ÂΜL (ref 0–0.1)
BASOPHILS NFR BLD AUTO: 1 % (ref 0–1)
BUN SERPL-MCNC: 11 MG/DL (ref 5–25)
CALCIUM SERPL-MCNC: 8.5 MG/DL (ref 8.4–10.2)
CHLORIDE SERPL-SCNC: 98 MMOL/L (ref 96–108)
CO2 SERPL-SCNC: 29 MMOL/L (ref 21–32)
CREAT SERPL-MCNC: 0.52 MG/DL (ref 0.6–1.3)
EOSINOPHIL # BLD AUTO: 0.38 THOUSAND/ÂΜL (ref 0–0.61)
EOSINOPHIL NFR BLD AUTO: 3 % (ref 0–6)
ERYTHROCYTE [DISTWIDTH] IN BLOOD BY AUTOMATED COUNT: 23.5 % (ref 11.6–15.1)
GFR SERPL CREATININE-BSD FRML MDRD: 101 ML/MIN/1.73SQ M
GLUCOSE SERPL-MCNC: 249 MG/DL (ref 65–140)
GLUCOSE SERPL-MCNC: 267 MG/DL (ref 65–140)
GLUCOSE SERPL-MCNC: 329 MG/DL (ref 65–140)
HCT VFR BLD AUTO: 35 % (ref 34.8–46.1)
HGB BLD-MCNC: 9.9 G/DL (ref 11.5–15.4)
IMM GRANULOCYTES # BLD AUTO: 0.15 THOUSAND/UL (ref 0–0.2)
IMM GRANULOCYTES NFR BLD AUTO: 1 % (ref 0–2)
LYMPHOCYTES # BLD AUTO: 2.55 THOUSANDS/ÂΜL (ref 0.6–4.47)
LYMPHOCYTES NFR BLD AUTO: 20 % (ref 14–44)
MCH RBC QN AUTO: 19.9 PG (ref 26.8–34.3)
MCHC RBC AUTO-ENTMCNC: 28.3 G/DL (ref 31.4–37.4)
MCV RBC AUTO: 70 FL (ref 82–98)
MONOCYTES # BLD AUTO: 0.93 THOUSAND/ÂΜL (ref 0.17–1.22)
MONOCYTES NFR BLD AUTO: 7 % (ref 4–12)
NEUTROPHILS # BLD AUTO: 8.92 THOUSANDS/ÂΜL (ref 1.85–7.62)
NEUTS SEG NFR BLD AUTO: 68 % (ref 43–75)
NRBC BLD AUTO-RTO: 0 /100 WBCS
PLATELET # BLD AUTO: 297 THOUSANDS/UL (ref 149–390)
PMV BLD AUTO: 10.9 FL (ref 8.9–12.7)
POTASSIUM SERPL-SCNC: 4.2 MMOL/L (ref 3.5–5.3)
RBC # BLD AUTO: 4.97 MILLION/UL (ref 3.81–5.12)
SODIUM SERPL-SCNC: 134 MMOL/L (ref 135–147)
WBC # BLD AUTO: 13 THOUSAND/UL (ref 4.31–10.16)

## 2023-08-22 PROCEDURE — 82948 REAGENT STRIP/BLOOD GLUCOSE: CPT

## 2023-08-22 PROCEDURE — 99232 SBSQ HOSP IP/OBS MODERATE 35: CPT | Performed by: INTERNAL MEDICINE

## 2023-08-22 PROCEDURE — 94660 CPAP INITIATION&MGMT: CPT

## 2023-08-22 PROCEDURE — 99239 HOSP IP/OBS DSCHRG MGMT >30: CPT | Performed by: HOSPITALIST

## 2023-08-22 PROCEDURE — 94760 N-INVAS EAR/PLS OXIMETRY 1: CPT

## 2023-08-22 PROCEDURE — 85025 COMPLETE CBC W/AUTO DIFF WBC: CPT | Performed by: INTERNAL MEDICINE

## 2023-08-22 PROCEDURE — 94640 AIRWAY INHALATION TREATMENT: CPT

## 2023-08-22 PROCEDURE — 80048 BASIC METABOLIC PNL TOTAL CA: CPT | Performed by: INTERNAL MEDICINE

## 2023-08-22 RX ORDER — POTASSIUM CHLORIDE 20 MEQ/1
40 TABLET, EXTENDED RELEASE ORAL DAILY
Qty: 60 TABLET | Refills: 0 | Status: SHIPPED | OUTPATIENT
Start: 2023-08-23

## 2023-08-22 RX ORDER — POTASSIUM CHLORIDE 20 MEQ/1
20 TABLET, EXTENDED RELEASE ORAL EVERY EVENING
Status: DISCONTINUED | OUTPATIENT
Start: 2023-08-22 | End: 2023-08-22 | Stop reason: HOSPADM

## 2023-08-22 RX ORDER — TORSEMIDE 20 MG/1
80 TABLET ORAL
Status: DISCONTINUED | OUTPATIENT
Start: 2023-08-22 | End: 2023-08-22 | Stop reason: HOSPADM

## 2023-08-22 RX ORDER — POTASSIUM CHLORIDE 20 MEQ/1
40 TABLET, EXTENDED RELEASE ORAL DAILY
Status: DISCONTINUED | OUTPATIENT
Start: 2023-08-23 | End: 2023-08-22 | Stop reason: HOSPADM

## 2023-08-22 RX ORDER — POTASSIUM CHLORIDE 20 MEQ/1
20 TABLET, EXTENDED RELEASE ORAL EVERY EVENING
Qty: 30 TABLET | Refills: 0 | Status: SHIPPED | OUTPATIENT
Start: 2023-08-22

## 2023-08-22 RX ADMIN — INSULIN LISPRO 25 UNITS: 100 INJECTION, SOLUTION INTRAVENOUS; SUBCUTANEOUS at 08:16

## 2023-08-22 RX ADMIN — LEVALBUTEROL HYDROCHLORIDE 1.25 MG: 1.25 SOLUTION RESPIRATORY (INHALATION) at 07:15

## 2023-08-22 RX ADMIN — APIXABAN 5 MG: 5 TABLET, FILM COATED ORAL at 08:15

## 2023-08-22 RX ADMIN — IPRATROPIUM BROMIDE 0.5 MG: 0.5 SOLUTION RESPIRATORY (INHALATION) at 07:15

## 2023-08-22 RX ADMIN — ESCITALOPRAM OXALATE 20 MG: 20 TABLET ORAL at 08:15

## 2023-08-22 RX ADMIN — INSULIN LISPRO 25 UNITS: 100 INJECTION, SOLUTION INTRAVENOUS; SUBCUTANEOUS at 12:02

## 2023-08-22 RX ADMIN — BUDESONIDE 0.5 MG: 0.5 INHALANT ORAL at 07:15

## 2023-08-22 RX ADMIN — FORMOTEROL FUMARATE DIHYDRATE 20 MCG: 20 SOLUTION RESPIRATORY (INHALATION) at 07:15

## 2023-08-22 RX ADMIN — POTASSIUM CHLORIDE 40 MEQ: 1500 TABLET, EXTENDED RELEASE ORAL at 08:15

## 2023-08-22 RX ADMIN — NYSTATIN: 100000 POWDER TOPICAL at 09:57

## 2023-08-22 RX ADMIN — TORSEMIDE 80 MG: 20 TABLET ORAL at 09:57

## 2023-08-22 RX ADMIN — INSULIN LISPRO 8 UNITS: 100 INJECTION, SOLUTION INTRAVENOUS; SUBCUTANEOUS at 08:14

## 2023-08-22 RX ADMIN — INSULIN LISPRO 12 UNITS: 100 INJECTION, SOLUTION INTRAVENOUS; SUBCUTANEOUS at 12:02

## 2023-08-22 RX ADMIN — PANTOPRAZOLE SODIUM 40 MG: 40 TABLET, DELAYED RELEASE ORAL at 05:41

## 2023-08-22 RX ADMIN — METOPROLOL SUCCINATE 100 MG: 50 TABLET, EXTENDED RELEASE ORAL at 08:15

## 2023-08-22 RX ADMIN — ATORVASTATIN CALCIUM 40 MG: 40 TABLET, FILM COATED ORAL at 08:15

## 2023-08-22 RX ADMIN — LORATADINE 10 MG: 10 TABLET ORAL at 08:14

## 2023-08-22 RX ADMIN — FLUTICASONE PROPIONATE 1 SPRAY: 50 SPRAY, METERED NASAL at 08:14

## 2023-08-22 NOTE — PLAN OF CARE
Problem: PAIN - ADULT  Goal: Verbalizes/displays adequate comfort level or baseline comfort level  Description: Interventions:  - Encourage patient to monitor pain and request assistance  - Assess pain using appropriate pain scale  - Administer analgesics based on type and severity of pain and evaluate response  - Implement non-pharmacological measures as appropriate and evaluate response  - Consider cultural and social influences on pain and pain management  - Notify physician/advanced practitioner if interventions unsuccessful or patient reports new pain  Outcome: Progressing     Problem: INFECTION - ADULT  Goal: Absence or prevention of progression during hospitalization  Description: INTERVENTIONS:  - Assess and monitor for signs and symptoms of infection  - Monitor lab/diagnostic results  - Monitor all insertion sites, i.e. indwelling lines, tubes, and drains  - Monitor endotracheal if appropriate and nasal secretions for changes in amount and color  - [place] appropriate cooling/warming therapies per order  - Administer medications as ordered  - Instruct and encourage patient and family to use good hand hygiene technique  - Identify and instruct in appropriate isolation precautions for identified infection/condition  Outcome: Progressing     Problem: RESPIRATORY - ADULT  Goal: Achieves optimal ventilation and oxygenation  Description: INTERVENTIONS:  - Assess for changes in respiratory status  - Assess for changes in mentation and behavior  - Position to facilitate oxygenation and minimize respiratory effort  - Oxygen administered by appropriate delivery if ordered  - Initiate smoking cessation education as indicated  - Encourage broncho-pulmonary hygiene including cough, deep breathe, Incentive Spirometry  - Assess the need for suctioning and aspirate as needed  - Assess and instruct to report SOB or any respiratory difficulty  - Respiratory Therapy support as indicated  Outcome: Progressing     Problem: Knowledge Deficit  Goal: Patient/family/caregiver demonstrates understanding of disease process, treatment plan, medications, and discharge instructions  Description: Complete learning assessment and assess knowledge base.   Interventions:  - Provide teaching at level of understanding  - Provide teaching via preferred learning methods  Outcome: Progressing

## 2023-08-22 NOTE — DISCHARGE SUMMARY
[address] [facility]  Discharge- Dejon Delhi 1960, 57 y.o. female MRN: [id number]  Unit/Bed#: -01 Encounter: [id number]  Primary Care Provider: Sybil Fiore MD   Date and time admitted to hospital: 8/11/2023  4:41 PM   Assessment/Plan   Class 3 severe obesity in adult [facility])  Assessment & Plan  • Encourage weight loss and lifestyle changes     Lactic acidosis  Assessment & Plan  • LA 2.4->2.6  • Trend        Acute on chronic diastolic congestive heart failure (HCC)  Assessment & Plan      Wt Readings from Last 3 Encounters:   08/21/23 128 kg (282 lb 6.4 oz)   07/26/23 132 kg (291 lb 9.6 oz)   06/08/23 129 kg (284 lb)      • Patient is currently 14 pounds over her most recent outpatient weight  • Chest x-ray shows pulmonary vascular congestion  • Pitting edema present; lung field auscultation difficult given body habitus  • BNP 73  • Received 60mg IV lasix in ED  • On admission, was placed back on torsemide 60 mg twice daily p.o.  • ins and outs, daily weights     Plan  • Low-sodium diabetic diet  • Switched to Lasix 80 mg IV every 8 hours on 8/15-suspect patient may be transition to oral medications or switch to Bumex as Lasix is now being dosed at 2040 mg daily risks of side effects may outweigh benefits  • Strict I/O  • Daily weights  • Cardiology follow-up noted today. Transition to oral TORSEMIDE PER CARDS.   • Trend BMP     Paroxysmal atrial fibrillation (HCC)  Assessment & Plan  • Home regimen: Eliquis, metoprolol  • Currently SR in hospital     Plan  • Continue Eliquis and metoprol     Acute on chronic respiratory failure with hypoxia (HCC)  Assessment & Plan  • Chronically 4L oxygen NC  • In ED placed on BIPAP then weaned to 12L [address] after receiving MERCER neb, 125mg IV solumedrol and 2g IV magnesium  • ABG on 6L oxygen: pH 7.3, pCO2 64.8, pO2 65.3, HCO3 33.7  • Received IV azithromycin and ceftriaxone in ED     Plan  • Consult Pulmonology-appreciate recommendations, will add IV iron x 3 days  • Respiratory protocol  • Wean oxygen to baseline as able  • Patient is currently 14 pounds over recent outpatient weight, 1+ pitting edema bilateral lower extremities. • Continue diuresis  • Patient oxygen requirements are back to baseline  • Wean oxygen as tolerated     Type 2 diabetes mellitus with stage 2 chronic kidney disease, with long-term current use of insulin [facility])  Assessment & Plan        Lab Results   Component Value Date     HGBA1C 9.9 (H) 05/11/2023                Recent Labs     08/20/23  2118 08/21/23  0747 08/21/23  1114 08/21/23  1402   POCGLU 387* 323* 309* 294*         Blood Sugar Average: Last 72 hrs:  (P) 257   • Home regimen: Metformin, Glargine 30 units daily at bedtime, glulisine 24 units with breakfast, 12 units with lunch and dinner     SSI; Subcutaneous Insulin Order Set  Blood Glucose checks TIDWM and QHS (Q6H for NPO patients)  Hold oral medications  Blood Glucose goal while inpatient is 140-180  Reduce basal insulin by 25-50% while inpatient  Consistent Carbohydrate Diet  -- Blood glucose levels highly elevated; likely secondary to high-dose steroids. Was on insulin drip  -- Was tapered off insulin drip. Continue on Lantus insulin and Humalog with meals and sliding scale  -Patient completed the course of prednisone. Will continue to monitor blood sugars closely to adjust Lantus insulin     Obstructive sleep apnea  Assessment & Plan  • Discharged 7/26 with Rx for nocturnal BIPAP  • Patient states she is noncompliant with BIPAP due to personal preference at home and continues to wear CPAP      Plan  • Tolerated BIPAP in hospital -->continue BIPAP HS     * Asthma with COPD with exacerbation ([address])  Assessment & Plan  • Home regimen: Albuterol, Bevespi, Budesonide, Benralalizumab, Montelukast, Xopenex  • Presented to ED from home via EMS for SOB since Tuesday.  Patient states she has been noncompliant with nebulizer treatments and BIPAP HS at home since being discharged on 7/26/23. Angeline Kwongnik says she has been compliant with inhalers, oral medications and CPAP HS. • In ED placed on BIPAP then weaned to 12L [address] after receiving MERCER neb, 125mg IV solumedrol and 2g IV magnesium  • ABG on 6L oxygen: pH 7.3, pCO2 64.8, pO2 65.3, HCO3 33.7  • Received IV azithromycin and ceftriaxone in ED  • Afebrile, procal 0.05     Plan  • Completed steroids per PULM. • Xopenex/Atrovent TID  • Pulmicort BID  • Performist BID   • Procalcitonin x2 is negative  • Will monitor off futher ABX.   • Respiratory protocol  • Pulmonology consult appreciated  • Patient completed the course of prednisone.        Hospital Course:     Joe Crook is a 57 y.o. female patient who originally presented to the hospital on   Admission Orders (From admission, onward)     Ordered        08/11/23 3330 Nisreen More  Once                     due to acute on chronic respiratory failure. Patient had multifactorial etiology of her dyspnea. She was diuresed under cardiology guidance. She is euvolemic at the time of discharge. She will take oral torsemide at the time of release from the hospital.  She also was treated for asthma with COPD exacerbation. She has completed steroids at this time. She can follow-up with pulmonary as an outpatient. Please see above list of diagnoses and related plan for additional information. Physical Exam:    GEN: No acute distress, comfortable, on o2  HEEENT: No JVD, PERRLA, no scleral icterus  RESP: Lungs clear to auscultation bilaterally  CV: RRR, +s1/s2   ABD: SOFT NON TENDER, POSITIVE BOWEL SOUNDS, NO DISTENTION  PSYCH: CALM  NEURO: [place] baseline, NO FOCAL DEFICITS  SKIN: NO RASH  EXTREM: NO EDEMA    CONSULTING PROVIDERS   IP CONSULT TO PULMONOLOGY  IP CONSULT TO CARDIOLOGY    PROCEDURES PERFORMED  * No surgery found *    RADIOLOGY RESULTS  XR chest portable    Result Date: 8/16/2023  Narrative: CHEST INDICATION:   sob.  COMPARISON: 8/14/2023 EXAM PERFORMED/VIEWS:  XR CHEST PORTABLE FINDINGS: Heart is top normal in size. Pulmonary vascular congestion and interstitial prominence is improved. No pneumothorax or pleural effusion. Osseous structures appear within normal limits for patient age. Impression: Slight improvement of pulmonary vascular congestion. Workstation performed: [id number]     Echo complete w/ contrast if indicated    Result Date: 8/15/2023  Narrative: •  Left Ventricle: Left ventricular cavity size is normal. Wall thickness is mildly increased. The left ventricular ejection fraction is 65% by visual estimation. . Systolic function is normal. Wall motion cannot be accurately assessed. Diastolic function is moderately abnormal, consistent with grade II (pseudonormal) relaxation. •  Right Ventricle: Right ventricle is not well visualized. Right ventricular cavity size is dilated. Systolic function is normal. •  Right Atrium: The atrium is dilated. •  Tricuspid Valve: The right ventricular systolic pressure is severely elevated. The estimated right ventricular systolic pressure is 42.07 mmHg. PASP increased compared to prior study dated 10/04/2022 (61 mmHg vs 48 mmHg prior). Otherwise, no significant changes. XR chest portable    Result Date: 8/14/2023  Narrative: CHEST INDICATION:   Shortness of breath and cough. . COMPARISON: Chest radiograph 8/11/2023.; 9/27/2022 EXAM PERFORMED/VIEWS:  XR CHEST PORTABLE Images:  1 FINDINGS: Cardiomediastinal silhouette appears unremarkable. Bilateral prominent interstitial markings are slightly worsened compared to prior study. Slightly worsening pulmonary edema. There is new blunting of the left costophrenic angle, concerning for trace effusion. Osseous structures appear within normal limits for patient age. Impression: Pulmonary vascular congestion is worsened since prior exam on 8/11/2023 with new blunting of the costophrenic angles concerning for bilateral effusions.  The study was marked in [facility] for immediate notification. I have personally reviewed this study including all images.  / MONICA Resident: Rosalina Barrientos, the attending radiologist, have reviewed the images and agree with the final report above. Workstation performed: [id number]     X-ray chest 1 view portable    Result Date: 8/11/2023  Narrative: CHEST INDICATION:   sob. COMPARISON: Chest radiograph July 22, 2023 EXAM PERFORMED/VIEWS:  XR CHEST PORTABLE FINDINGS: Cardiomediastinal silhouette appears unremarkable. Mild prominence of the interstitial markings is similar to prior study. No pleural effusion. No pneumothorax. Osseous structures appear within normal limits for patient age. Impression: Mild pulmonary vascular congestion is similar to July 22, 2023.  Workstation performed: [id number]       LABS  Results from last 7 days   Lab Units 08/22/23 0223 08/21/23 0457 08/20/23  0508 08/19/23  0442 08/18/23  0451 08/17/23  0549 08/16/23  0508   WBC Thousand/uL 13.00* 11.67* 13.25* 11.74* 16.84* 18.97* 14.16*   HEMOGLOBIN g/dL 9.9* 9.8* 9.6* 9.3* 9.4* 9.1* 8.4*   HEMATOCRIT % 35.0 34.6* 33.7* 32.5* 33.2* 31.9* 29.5*   MCV fL 70* 70* 69* 69* 69* 69* 67*   PLATELETS Thousands/uL 297 284 291 315 353 361 311     Results from last 7 days   Lab Units 08/22/23 0223 08/21/23 0457 08/20/23  0508 08/19/23  0442 08/18/23  0451 08/17/23  0549 08/16/23  0508   SODIUM mmol/L 134* 135 134* 137 139 137 139   POTASSIUM mmol/L 4.2 3.5 3.6 3.3* 3.7 3.4* 3.4*   CHLORIDE mmol/L 98 97 95* 95* 94* 91* 93*   CO2 mmol/L 29 34* 33* 35* 38* 40* 40*   BUN mg/dL 11 9 10 12 12 13 12   CREATININE mg/dL 0.52* 0.49* 0.50* 0.46* 0.54* 0.53* 0.52*   CALCIUM mg/dL 8.5 8.1* 7.9* 8.0* 8.1* 7.8* 7.4*   EGFR ml/min/1.73sq m 101 103 102 105 100 100 101   GLUCOSE RANDOM mg/dL 267* 210* 228* 173* 109 165* 181*                  Results from last 7 days   Lab Units 08/22/23  0724 08/21/23  1953 08/21/23  1654 08/21/23  1619 08/21/23  1402 08/21/23  1114 08/21/23  0747 08/20/23  2118 08/20/23  1641 08/20/23  1127   POC GLUCOSE mg/dl 249* 256* 146* 149* 294* 309* 323* 387* 265* 214*                       Cultures:         Invalid input(s): "URIBILINOGEN"                Condition at Discharge:  good      Discharge instructions/Information to patient and family:   See after visit summary for information provided to patient and family. Provisions for Follow-Up Care:  See after visit summary for information related to follow-up care and any pertinent home health orders. Disposition:     Home       Discharge Statement:  I spent 45 minutes discharging the patient. This time was spent on the day of discharge. I had direct contact with the patient on the day of discharge. Greater than 50% of the total time was spent examining patient, answering all patient questions, arranging and discussing plan of care with patient as well as directly providing post-discharge instructions. Additional time then spent on discharge activities. Discharge Medications:  See after visit summary for reconciled discharge medications provided to patient and family.       ** Please Note: This note has been constructed using a voice recognition system **

## 2023-08-22 NOTE — CASE MANAGEMENT
Case Management Progress Note    Patient name Dejon Mountainair  Location /-16 MRN [id number]  : 1960 Date 2023       LOS (days): 11  Geometric Mean LOS (GMLOS) (days):   Days to GMLOS:        OBJECTIVE:        Current admission status: Inpatient  Preferred Pharmacy:   [id number] - [address] [address]  Phone: [phone] Fax: [phone]    [address] [place] [address]  Phone: [phone] Fax: [phone]    Primary Care Provider: Sybil Fiore MD    Primary Insurance: [facility]  Secondary Insurance:     PROGRESS NOTE:  Faxed discharge instructions to [facility] at [phone].

## 2023-08-22 NOTE — PLAN OF CARE
Problem: PAIN - ADULT  Goal: Verbalizes/displays adequate comfort level or baseline comfort level  Description: Interventions:  - Encourage patient to monitor pain and request assistance  - Assess pain using appropriate pain scale  - Administer analgesics based on type and severity of pain and evaluate response  - Implement non-pharmacological measures as appropriate and evaluate response  - Consider cultural and social influences on pain and pain management  - Notify physician/advanced practitioner if interventions unsuccessful or patient reports new pain  Outcome: Progressing     Problem: INFECTION - ADULT  Goal: Absence or prevention of progression during hospitalization  Description: INTERVENTIONS:  - Assess and monitor for signs and symptoms of infection  - Monitor lab/diagnostic results  - Monitor all insertion sites, i.e. indwelling lines, tubes, and drains  - Monitor endotracheal if appropriate and nasal secretions for changes in amount and color  - [place] appropriate cooling/warming therapies per order  - Administer medications as ordered  - Instruct and encourage patient and family to use good hand hygiene technique  - Identify and instruct in appropriate isolation precautions for identified infection/condition  Outcome: Progressing

## 2023-08-22 NOTE — PROGRESS NOTES
Progress Note - Cardiology   Andrews Rodríguez 57 y.o. female MRN: [id number]  Unit/Bed#: -01 Encounter: [id number]        Problem List:  Principal Problem:    Asthma with COPD with exacerbation ([address])  Active Problems:    Obstructive sleep apnea    Type 2 diabetes mellitus with stage 2 chronic kidney disease, with long-term current use of insulin (HCC)    Acute on chronic respiratory failure with hypoxia (HCC)    Paroxysmal atrial fibrillation (HCC)    Acute on chronic diastolic congestive heart failure (HCC)    Lactic acidosis    Class 3 severe obesity in adult [facility])      Assessment:  1. Acute on chronic respiratory failure, resolved with diuresis  a. Baseline oxygen requirement 4 L  2. Acute on chronic diastolic heart failure  a. 8/2023 Echo: LVEF 75%, grade 2 diastolic dysfunction, dilated RV with normal RV systolic function, dilated RA, severely elevated RV systolic pressure 61 mmHg  b. 9/2021 RHC: Severely elevated left and right-sided filling pressures, severe postcapillary pulmonary hypertension group 2, normal cardiac output  c. Baseline weight reportedly in the mid 280s  d. OP diuretic torsemide 60 twice daily with 20 twice daily potassium  3. Paroxysmal atrial fibrillation  a. Eliquis 5 twice daily for CVA risk reduction  b. AV blocking Rx: Toprol- daily  4. Type 2 diabetes  5. COPD  6. Obstructive sleep apnea    Plan/ Discussion:  • Down to 278 pounds standing weight  • Diuresis total of -24.4 L  • Creatinine stable at 0.5, potassium 4.2  • IV access has unfortunately been lost  • Change to oral torsemide 80 twice daily (previously on 60 BID)  • Change potassium to 40 AM, 20 PM  • Continue Eliquis, Toprol XL  • Needs BMP in 5 days after DC, order placed  • Follow up request 8/31/23 with myself      Subjective:  Feeling fine. Back to baseline. Hoping to get discharged soon.     Vitals:  Vitals:    08/21/23 0458 08/22/23 0522   Weight: 128 kg (282 lb 6.4 oz) 126 kg (278 lb 7.1 oz)   ,  Vitals: 08/22/23 0400 08/22/23 0522 08/22/23 0715 08/22/23 0723   BP:    132/61   BP Location:       Pulse:    77   Resp:    18   Temp:    97.9 °F (36.6 °C)   TempSrc:       SpO2: 97%  94% 95%   Weight:  126 kg (278 lb 7.1 oz)     Height:           Exam:  General: Alert awake and oriented, no acute distress  Heart:  Regular rate and rhythm, no murmurs, Normal S1, no edema    Respiratory effort/ Lungs:  Breathing comfortably on BiPAP, clear bilaterally without wheezing, rales, crackles   Abdominal: Non-tender to palpation, + bowel sounds, soft, no masses or distension  Lower Limbs:  No edema            Telemetry:           Medications:    Current Facility-Administered Medications:   •  acetaminophen (TYLENOL) tablet 650 mg, 650 mg, Oral, Q6H PRN, Fercho Aguirre PA-C, 650 mg at 08/21/23 2152  •  apixaban (ELIQUIS) tablet 5 mg, 5 mg, Oral, BID, Fercho Carla PA-C, 5 mg at 08/22/23 9641  •  atorvastatin (LIPITOR) tablet 40 mg, 40 mg, Oral, Daily, Fercho Carla PA-C, 40 mg at 08/22/23 0815  •  budesonide (PULMICORT) inhalation solution 0.5 mg, 0.5 mg, Nebulization, Q12H, Ferchojavier Aguirre PA-C, 0.5 mg at 08/22/23 0715  •  diphenhydrAMINE (BENADRYL) injection 25 mg, 25 mg, Intravenous, Q6H PRN, Riki Elias MD, 25 mg at 08/16/23 8001  •  escitalopram (LEXAPRO) tablet 20 mg, 20 mg, Oral, Daily, Fercho Carla PA-C, 20 mg at 08/22/23 0815  •  fluticasone (FLONASE) 50 mcg/act nasal spray 1 spray, 1 spray, Nasal, BID, Ferchojavier Aguirre PA-C, 1 spray at 08/22/23 8972  •  formoterol (PERFOROMIST) nebulizer solution 20 mcg, 20 mcg, Nebulization, Q12H, MARIELA Ely-C, 20 mcg at 08/22/23 0715  •  furosemide (LASIX) injection 80 mg, 80 mg, Intravenous, BID (diuretic), Charity Harrell PA-C, 80 mg at 08/22/23 9612  •  insulin glargine (LANTUS) subcutaneous injection 65 Units 0.65 mL, 65 Units, Subcutaneous, HS, Johnny Birmingham DO, 65 Units at 08/21/23 2150  •  insulin lispro (HumaLOG) 100 units/mL subcutaneous injection 25 Units, 25 Units, Subcutaneous, TID With Meals, Johnny Birmingham, DO, 25 Units at 08/22/23 0816  •  insulin lispro (HumaLOG) 100 units/mL subcutaneous injection 4-20 Units, 4-20 Units, Subcutaneous, TID AC, 8 Units at 08/22/23 0814 **AND** [CANCELED] Fingerstick Glucose (POCT), , , TID AC, Marien Hodgkins, MD  •  ipratropium (ATROVENT) 0.02 % inhalation solution 0.5 mg, 0.5 mg, Nebulization, TID, MARIELA Gar-C, 0.5 mg at 08/22/23 0715  •  levalbuterol (Mika Graver) inhalation solution 1.25 mg, 1.25 mg, Nebulization, TID, MARIELA Gar-C, 1.25 mg at 08/22/23 0715  •  loratadine (CLARITIN) tablet 10 mg, 10 mg, Oral, Daily, MARIELA Gar-C, 10 mg at 08/22/23 3447  •  LORazepam (ATIVAN) tablet 1 mg, 1 mg, Oral, HS, MARIELA Gar-C, 1 mg at 08/21/23 2150  •  metoprolol succinate (TOPROL-XL) 24 hr tablet 100 mg, 100 mg, Oral, Daily, MARIELA Gar-C, 100 mg at 08/22/23 0815  •  montelukast (SINGULAIR) tablet 10 mg, 10 mg, Oral, HS, MARIELA Gar-C, 10 mg at 08/21/23 2150  •  nystatin (MYCOSTATIN) powder, , Topical, BID, Antolin Slaughter MD, Given at 08/21/23 1704  •  pantoprazole (PROTONIX) EC tablet 40 mg, 40 mg, Oral, Early Morning, MARIELA Gar-C, 40 mg at 08/22/23 0541  •  potassium chloride (K-DUR,KLOR-CON) CR tablet 40 mEq, 40 mEq, Oral, BID, MARIELA Alamo-C, 40 mEq at 08/22/23 0916  •  traZODone (DESYREL) tablet 150 mg, 150 mg, Oral, HS, Ross Nelson PA-C, 150 mg at 08/21/23 2150      Labs/Data:        Results from last 7 days   Lab Units 08/22/23 0223 08/21/23 0457 08/20/23  0508   WBC Thousand/uL 13.00* 11.67* 13.25*   HEMOGLOBIN g/dL 9.9* 9.8* 9.6*   HEMATOCRIT % 35.0 34.6* 33.7*   PLATELETS Thousands/uL 297 284 291     Results from last 7 days   Lab Units 08/22/23 0223 08/21/23 0457 08/20/23  0508   POTASSIUM mmol/L 4.2 3.5 3.6   CHLORIDE mmol/L 98 97 95*   CO2 mmol/L 29 34* 33*   BUN mg/dL 11 9 10   CREATININE mg/dL 0.52* 0.49* 0.50*

## 2023-08-23 ENCOUNTER — PATIENT OUTREACH (OUTPATIENT)
Dept: FAMILY MEDICINE CLINIC | Facility: HOSPITAL | Age: 63
End: 2023-08-23

## 2023-08-23 DIAGNOSIS — Z79.899 ENCOUNTER FOR LONG-TERM (CURRENT) DRUG USE: ICD-10-CM

## 2023-08-23 RX ORDER — LORAZEPAM 0.5 MG/1
TABLET ORAL
Qty: 90 TABLET | Refills: 0 | Status: SHIPPED | OUTPATIENT
Start: 2023-08-23

## 2023-08-23 NOTE — PROGRESS NOTES
Outpatient Care Management Note:    ADT alert received that patient was discharged to home with [facility] after being hospitalized for COPD exacerbation. Voice mail message left for Ivy, with my contact information, requesting a call back to follow up post hospitalization. Ivy called back. She states that her breathing is improved. She noted that she does not have her discharge instructions. They called her from the hospital to inform her that she left them. They were supposed to mail them to her. CM verbally reviewed all discharge instructions. I encouraged her to write them down, but she said she could not. I did mail her a copy of her AVS.     We reviewed all medication changes twice and Ivy verbalized understanding. Ivy did receive a voice mail message from [facility] this morning. She will call them back as soon as we hang up. CM reinforced the importance of having VNA as they will be able to review all discharge and medication instructions. We discussed that she needs to complete a BMP by 8/28. We reviewed all upcoming appts and CM stressed the importance of attending! We discussed daily weights: how and why we check daily weights. CM reviewed that her weight was up 14 pounds on admission. We discussed the impact of weight gain on her heart and breathing. We discussed when to call cardiology related to weight gain. Ivy is using her oxygen at 4 liters. She had her CPAP on during our conversation. She states she is using her BIPAP at night. Ivy has not checked her blood sugar yet this morning. She states that she needs to reapply a new dexcom sensor. She states that she has the sensors. CM strongly encouraged her to do this as soon as possible. Ivy did take her lantus last night. She took 40 units. It is ordered at 30 units. She states she is eating and has no trouble getting meals. Ivy has my contact information and will call with any questions.

## 2023-08-23 NOTE — UTILIZATION REVIEW
NOTIFICATION OF ADMISSION DISCHARGE   This is a Notification of Discharge from [facility]. Please be advised that this patient has been discharge from our facility. Below you will find the admission and discharge date and time including the patient’s disposition. UTILIZATION REVIEW CONTACT:  Evelina Hernández  Utilization   Network Utilization Review Department  Phone: [phone] carefully listen to the prompts. All voicemails are confidential.  Email: [email or web address]     ADMISSION INFORMATION  PRESENTATION DATE: 8/11/2023  4:41 PM  OBERVATION ADMISSION DATE:   INPATIENT ADMISSION DATE: 8/11/23  7:23 PM   DISCHARGE DATE: 8/22/2023  1:07 PM   DISPOSITION:Home with [address] INFORMATION:  Send all requests for admission clinical reviews, approved or denied determinations and any other requests to dedicated fax number below belonging to the campus where the patient is receiving treatment.  List of dedicated fax numbers:  [place] DENIALS (Administrative/Medical Necessity) [phone] [address] [facility] (Maternity/NICU/Pediatrics) [phone]   [facility] [phone]   [facility] [phone] [address] [facility] [phone]   [address] [place] [address] [phone]   [facility] [phone]   [address] [place] [address] [address] [facility] [phone]   [address] [place] [address] [phone] [address] [facility] [phone] [address] [facility] [address] [facility] [phone]

## 2023-08-23 NOTE — TELEPHONE ENCOUNTER
Patient left message on voicemail. States that she requested her Lorazepam refill and it has not been sent in yet. Patient requesting that this be refilled.

## 2023-08-29 ENCOUNTER — PATIENT OUTREACH (OUTPATIENT)
Dept: FAMILY MEDICINE CLINIC | Facility: HOSPITAL | Age: 63
End: 2023-08-29

## 2023-08-29 NOTE — PROGRESS NOTES
Outpatient Care Management Note:    Voice mail message left for Ivy, with my contact information, requesting a call back. Call received from [facility]. She states that she gets short of breath with exertion. She is able to get her meals. She is not getting dressed daily and spends a lot of time resting. She did start with [facility]-nursing, physical and occupational therapy. Ivy is using her oxygen at 4 L. She states that her pulse ox has been averaging around 92%. She was using her cpap machine when I called. I did remind her that she only needs the cpap at night when sleeping. Ivy has not been checking her blood sugars since coming home from the hospital .  CM reinforced the risks of taking insulin without knowing what your blood sugar is. She states that she never received her freestyle sensors, nor did she call for follow up on them. CM instructed her to call now. Ivy states that she needs a battery for her glucometer. CM strongly encouraged her to have someone get it for her! Ivy did not complete her BMP yesterday. CM will call [facility] and see if they will draw it tomorrow. We reviewed all upcoming appts. Ivy will have a friend, Gilbertocristi Gallegos,  take her to cardiology on Thursday. CM called [facility] and spoke with Alok Baker. They will draw the BMP. Fax number is [phone]. BMP faxed to [facility].

## 2023-08-30 DIAGNOSIS — E11.65 TYPE 2 DIABETES MELLITUS WITH HYPERGLYCEMIA, UNSPECIFIED WHETHER LONG TERM INSULIN USE (HCC): Primary | ICD-10-CM

## 2023-08-30 NOTE — TELEPHONE ENCOUNTER
Hans Yanez from [facility] wanted to let us know Karen Cool is very noncompliant - not following diabetic diet. Lots of junk food in the house, cookies, chips, etc. Will reiterate needs follow-up appointment.

## 2023-08-31 ENCOUNTER — OFFICE VISIT (OUTPATIENT)
Dept: CARDIOLOGY CLINIC | Facility: CLINIC | Age: 63
End: 2023-08-31
Payer: COMMERCIAL

## 2023-08-31 VITALS
SYSTOLIC BLOOD PRESSURE: 116 MMHG | HEART RATE: 84 BPM | BODY MASS INDEX: 50.85 KG/M2 | DIASTOLIC BLOOD PRESSURE: 64 MMHG | WEIGHT: 287 LBS | HEIGHT: 63 IN | OXYGEN SATURATION: 92 %

## 2023-08-31 DIAGNOSIS — J96.21 ACUTE ON CHRONIC RESPIRATORY FAILURE WITH HYPOXIA (HCC): ICD-10-CM

## 2023-08-31 DIAGNOSIS — I48.0 PAROXYSMAL ATRIAL FIBRILLATION (HCC): ICD-10-CM

## 2023-08-31 DIAGNOSIS — I50.32 CHRONIC DIASTOLIC CONGESTIVE HEART FAILURE (HCC): Primary | ICD-10-CM

## 2023-08-31 PROCEDURE — 99214 OFFICE O/P EST MOD 30 MIN: CPT | Performed by: PHYSICIAN ASSISTANT

## 2023-08-31 NOTE — PATIENT INSTRUCTIONS
Please check lab work as soon as possible. This was ordered today. I requested that the home nurses draw this when they come to check on you. In the mean time continue remainder of medications the same. Please avoid foods high in sodium as much as possible. This will make your swelling and fluid retention worse. We will see you back in about 2-3 weeks for a close follow up appointment.

## 2023-08-31 NOTE — PROGRESS NOTES
Cardiology Office Follow Up  Milton Starkey  1960  [id number]      ASSESSMENT:  1. Chronic respiratory failure  a. Baseline oxygen requirement 4 L  2. Acute on chronic diastolic heart failure  a. 8/2023 Echo: LVEF 80%, grade 2 diastolic dysfunction, dilated RV with normal RV systolic function, dilated RA, severely elevated RV systolic pressure 61 mmHg  b. 9/2021 RHC: Severely elevated left and right-sided filling pressures, severe postcapillary pulmonary hypertension group 2, normal cardiac output  c. Wt previously down to 278 on admission August 2023  d. Current diuretic Torsemide 80 BID with potassium 40 AM, 20 PM  3. Paroxysmal atrial fibrillation  a. Eliquis 5 twice daily for CVA risk reduction  b. AV blocking Rx: Toprol- daily  4. Type 2 diabetes  5. COPD  6. Obstructive sleep apnea    PLAN/ DISCUSSION:  • Mattie seems to have gained probably 10 pounds since her hospital discharge despite high dose of diuretic torsemide 80 mg twice daily. Speaking with her today she has significant sodium intake having eaten today both a hogie and a can of soup. We discussed extensively that with his level of sodium intake it will be nearly impossible to keep volume off. • I reordered a BMP and a BNP which can hopefully be drawn this weekend by her home nursing. Based on the results of this I suspect we will have to either increase her diuretic dosing or add some metolazone. • She reports drinking a lot of fluid at home and frequent thirst. She is not checking her blood sugars. We discussed the importance that she needs to keep a close eye on her glucose levels as this could be contributing to her increased thirst    • She is high risk for readmission. I am going to follow-up with her over the phone regarding her blood work and make any changes necessary. She has a follow-up appoint with her primary cardiologist in 1 month. We discussed in detail today that a lot of changes may need to be made over the phone. Unfortunately I have no openings to see her back again in the next 2-3 weeks. • Otherwise I have made no changes to her medications. Wt 287 lbs, was 278 on discharge    Interval History/ HPI:   Since her discharge she denies shortness of breath. She is always short of breath but perhaps this is worse in the last few days. She continues to wear 4 L of oxygen nasal cannula around-the-clock. She frequently wears her CPAP during the day. She says she only wears this when she lays down but she is laying down for several hours a day. She is unsure if her legs feel more swollen or not. She reports frequent thirst and drinking a lot of fluids. She is not checking her blood sugars as she did not have the equipment to do so. She is accompanied today by a friend who is transported her to the appointment. She tries to avoid salt but notes that she does at times even preprepared and salty foods. This is made difficult by the fact that minimal exertion makes her short of breath so she feels as if she cannot cook. We did discuss some healthy options today including steamed vegetables and less commercially prepared foods. She also notes severe fatigue. This is not necessarily a new symptom. Vitals:  /64 (BP Location: Right arm, Patient Position: Sitting, Cuff Size: Large)   Pulse 84   Ht 5' 3" (1.6 m)   Wt 130 kg (287 lb)   SpO2 92%   BMI 50.84 kg/m²      Past Medical History:   Diagnosis Date   • Acute blood loss anemia 6/1/2021   • Acute diverticulitis 5/2/2021   • Alcohol abuse 5/21/2020   • Anemia    • Atrial fibrillation [facility])    • Cervical radiculopathy    • CHF (congestive heart failure) (HCC)    • Chronic low back pain    • Chronic obstructive asthma ([address]) 2/20/2018   • Cigarette nicotine dependence without complication 34/14/2171    Quit 12/10/2019.     • Community acquired pneumonia 5/21/2020   • Depression with anxiety 3/9/2014   • Diabetes mellitus with hyperglycemia ([address])    • Diverticulitis 6/6/2021   • Elevated liver enzymes    • GERD (gastroesophageal reflux disease)    • Hypersomnolence 10/28/2020   • Hypokalemia 12/4/2018   • Paresthesia of upper extremity    • Plantar fasciitis    • Restless legs syndrome 4/8/2014   • Sexual dysfunction    • Sleep apnea, obstructive    • Tenosynovitis of finger    • Tinea corporis    • Tobacco use 2/20/2018    Currently smoking 3-4 cigarettes daily   • Trigger middle finger of left hand 12/14/2017   • Trigger ring finger of left hand 12/14/2017   • Weakness of upper extremity      Social History     Socioeconomic History   • Marital status: Significant Other     Spouse name: Not on file   • Number of children: Not on file   • Years of education: Not on file   • Highest education level: Not on file   Occupational History   • Not on file   Tobacco Use   • Smoking status: Former     Packs/day: 0.25     Years: 29.00     Total pack years: 7.25     Types: Cigarettes     Start date: 200     Quit date: 12/10/2019     Years since quitting: 3.7   • Smokeless tobacco: Never   Vaping Use   • Vaping Use: Never used   Substance and Sexual Activity   • Alcohol use: Not Currently     Alcohol/week: 20.0 standard drinks of alcohol     Types: 20 Cans of beer per week     Comment: about 6 coors light every night, stopped in 2019   • Drug use: No   • Sexual activity: Not Currently   Other Topics Concern   • Not on file   Social History Narrative   • Not on file     Social Determinants of Health     Financial Resource Strain: Not on file   Food Insecurity: No Food Insecurity (8/14/2023)    Hunger Vital Sign    • Worried About Running Out of Food in the Last Year: Never true    • Ran Out of Food in the Last Year: Never true   Transportation Needs: No Transportation Needs (8/14/2023)    PRAPARE - Transportation    • Lack of Transportation (Medical): No    • Lack of Transportation (Non-Medical):  No   Physical Activity: Not on file   Stress: Not on file   Social Connections: Not on file   Intimate Partner Violence: Not on file   Housing Stability: Low Risk  (2023)    Housing Stability Vital Sign    • Unable to Pay for Housing in the Last Year: No    • Number of Places Lived in the Last Year: 1    • Unstable Housing in the Last Year: No      Family History   Problem Relation Age of Onset   • Alzheimer's disease Mother    • Atrial fibrillation Mother    • Dementia Mother    • Heart disease Mother         heart problem   • Seizures Mother    • Parkinsonism Father    • Arthritis Father    • Atrial fibrillation Father    • No Known Problems Daughter    • Cri-du-chat syndrome Daughter    • Colon cancer Maternal Grandmother 80   • Diabetes type II Maternal Grandmother    • No Known Problems Brother    • No Known Problems Son    • Substance Abuse Neg Hx         mother,father   • Mental illness Neg Hx         mother,father   • Colon polyps Neg Hx      Past Surgical History:   Procedure Laterality Date   • ABDOMINAL SURGERY     • CARPAL TUNNEL RELEASE Bilateral    •  SECTION     • CHOLECYSTECTOMY      laparoscopic   • ESOPHAGOGASTRODUODENOSCOPY N/A 12/3/2018    Procedure: ESOPHAGOGASTRODUODENOSCOPY (EGD) AND COLONOSCOPY;  Surgeon: Sd King MD;  Location: [facility];  Service: Gastroenterology   • GASTRIC BYPASS      for morbid obesity with gilda en y   • HERNIA REPAIR     • HYSTERECTOMY     • VA EXCISION GANGLION WRIST DORSAL/VOLAR PRIMARY Left 2017    Procedure: LONG FINGER GANGLION CYST EXCISION;  Surgeon: Valorie Litten, MD;  Location: [facility];  Service: Orthopedics   • VA TENDON SHEATH INCISION Left 2017    Procedure: Lupe [place], RING, TRIGGER FINGER RELEASE;  Surgeon: Valorie Litten, MD;  Location: [facility];  Service: Orthopedics   • SHOULDER SURGERY Right        Current Outpatient Medications:   •  albuterol (2.5 mg/3 mL) 0.083 % nebulizer solution, Take 3 mL (2.5 mg total) by nebulization every 6 (six) hours as needed for wheezing or shortness of breath, Disp: 1080 mL, Rfl: 3  •  albuterol (PROVENTIL HFA,VENTOLIN HFA) 90 mcg/act inhaler, Inhale 2 puffs every 4 (four) hours as needed for wheezing, Disp: 8.5 g, Rfl: 5  •  apixaban (Eliquis) 5 mg, Take 1 tablet (5 mg total) by mouth 2 (two) times a day, Disp: 60 tablet, Rfl: 2  •  atorvastatin (LIPITOR) 40 mg tablet, Take 1 tablet (40 mg total) by mouth daily, Disp: 90 tablet, Rfl: 3  •  Benralizumab 30 MG/ML SOAJ, Inject 1 mL under the skin every 28 days, Disp: 1 mL, Rfl: 11  •  Blood Glucose Monitoring Suppl ("[employer]" Contour Link 2. 4) w/Device KIT, Test 3 times daily, Disp: 1 kit, Rfl: 0  •  budesonide (PULMICORT) 0.5 mg/2 mL nebulizer solution, TAKE 2 ML (0.5 MG TOTAL) BY NEBULIZATION TWICE A DAY RINSE MOUTH AFTER USE, Disp: 60 mL, Rfl: 3  •  Contour Next Test test strip, USE TO TEST BLOOD SUGAR 3 TIMES A DAY, Disp: 100 strip, Rfl: 3  •  [employer] Lancets Thin 26G MISC, USE 3 (THREE) TIMES A DAY, Disp: 100 each, Rfl: 5  •  EPINEPHrine (EPIPEN) 0.3 mg/0.3 mL SOAJ, Inject 0.3 mL (0.3 mg total) into a muscle once for 1 dose, Disp: 0.6 mL, Rfl: 0  •  escitalopram (LEXAPRO) 20 mg tablet, TAKE 1 TABLET BY MOUTH EVERY DAY, Disp: 90 tablet, Rfl: 0  •  ferrous sulfate 220 (44 Fe) mg/5 mL solution, TAKE 5 ML (220 MG TOTAL) BY MOUTH 2 (TWO) TIMES A DAY BEFORE MEALS, Disp: 473 mL, Rfl: 2  •  fluticasone (FLONASE) 50 mcg/act nasal spray, 1 spray into each nostril 2 (two) times a day, Disp: 1 Bottle, Rfl: 3  •  glycopyrrolate-formoterol (BEVESPI AEROSPHERE) 9-4.8 MCG/ACT inhaler, Inhale 2 puffs 2 (two) times a day, Disp: 10.7 g, Rfl: 5  •  insulin glulisine (Apidra SoloStar) 100 units/mL injection pen, 24 UNITS BREAKFAST 15 UNITS AT LUNCH AND DINNER, Disp: 15 mL, Rfl: 0  •  Insulin Pen Needle (BD Pen Needle Love 2nd Gen) 32G X 4 MM MISC, Use daily at bedtime Use 4 new needles daily . , Disp: 400 each, Rfl: 3  •  Lantus SoloStar 100 units/mL SOPN, INJECT 0.3 ML (30 UNITS TOTAL) UNDER THE SKIN DAILY AT BEDTIME, Disp: 15 mL, Rfl: 3  •  levalbuterol (XOPENEX) 1.25 mg/0.5 mL nebulizer solution, Take 0.5 mL (1.25 mg total) by nebulization 2 (two) times a day, Disp: 60 vial, Rfl: 0  •  loratadine (CLARITIN) 10 mg tablet, Take by mouth, Disp: , Rfl:   •  LORazepam (ATIVAN) 0.5 mg tablet, TAKE 2 TABLETS BY MOUTH AT BEDTIME AND 1 EVERY 6 HOURS AS NEEDED FOR ANXIETY, Disp: 90 tablet, Rfl: 0  •  metFORMIN (GLUCOPHAGE-XR) 500 mg 24 hr tablet, TAKE 2 TABLETS BY MOUTH TWICE A DAY WITH FOOD, Disp: 360 tablet, Rfl: 1  •  metoprolol succinate (TOPROL-XL) 100 mg 24 hr tablet, Take 1 tablet (100 mg total) by mouth daily, Disp: 90 tablet, Rfl: 3  •  montelukast (SINGULAIR) 10 mg tablet, TAKE 1 TABLET BY MOUTH DAILY AT BEDTIME, Disp: 90 tablet, Rfl: 3  •  naloxone (NARCAN) 4 mg/0.1 mL nasal spray, Administer 1 spray into a nostril. If breathing does not return to normal or if breathing difficulty resumes after 2-3 minutes, give another dose in the other nostril using a new spray., Disp: 1 each, Rfl: 1  •  omeprazole (PriLOSEC) 40 MG capsule, TAKE 1 CAPSULE (40 MG TOTAL) BY MOUTH DAILY. , Disp: 90 capsule, Rfl: 3  •  potassium chloride (K-DUR,KLOR-CON) 20 mEq tablet, Take 2 tablets (40 mEq total) by mouth daily Do not start before August 23, 2023., Disp: 60 tablet, Rfl: 0  •  potassium chloride (K-DUR,KLOR-CON) 20 mEq tablet, Take 1 tablet (20 mEq total) by mouth every evening, Disp: 30 tablet, Rfl: 0  •  semaglutide, 0.25 or 0.5 mg/dose, (Ozempic, 0.25 or 0.5 MG/DOSE,) 2 mg/3 mL injection pen, Inject 0.75 mL (0.5 mg total) under the skin every 7 days, Disp: 3 mL, Rfl: 5  •  torsemide 40 MG TABS, Take 80 mg by mouth 2 (two) times a day, Disp: 120 tablet, Rfl: 0  •  traZODone (DESYREL) 150 mg tablet, TAKE 1 TABLET BY MOUTH AT BEDTIME, Disp: 90 tablet, Rfl: 0      Review of Systems:  Review of Systems   Constitutional: Positive for fatigue. Negative for chills and fever. Frequent thirst   HENT: Negative for ear pain and sore throat.     Eyes: Negative for pain and visual disturbance. Respiratory: Positive for shortness of breath. Negative for cough. Cardiovascular: Negative for chest pain and palpitations. Gastrointestinal: Negative for abdominal pain and vomiting. Genitourinary: Negative for dysuria and hematuria. Musculoskeletal: Negative for arthralgias and back pain. Skin: Negative for color change and rash. Neurological: Negative for seizures and syncope. All other systems reviewed and are negative. Physical Exam:  Physical Exam  Constitutional:       General: She is not in acute distress. Appearance: Normal appearance. She is obese. She is ill-appearing. HENT:      Head: Normocephalic and atraumatic. Right Ear: External ear normal.      Left Ear: External ear normal.      Mouth/Throat:      Pharynx: No oropharyngeal exudate or posterior oropharyngeal erythema. Eyes:      General:         Right eye: No discharge. Left eye: No discharge. Conjunctiva/sclera: Conjunctivae normal.      Pupils: Pupils are equal, round, and reactive to light. Cardiovascular:      Rate and Rhythm: Normal rate and regular rhythm. Pulses: Normal pulses. Heart sounds: Normal heart sounds. No murmur heard. No friction rub. No gallop. Pulmonary:      Effort: Pulmonary effort is normal.      Breath sounds: Normal breath sounds. No wheezing, rhonchi or rales. Comments: Breathing comfortably on 4 L nasal cannula oxygen  Abdominal:      General: Abdomen is flat. There is no distension. Palpations: Abdomen is soft. Tenderness: There is no abdominal tenderness. Musculoskeletal:         General: No swelling, tenderness or deformity. Normal range of motion. Cervical back: Normal range of motion. Comments: At least 1+ pitting edema but very difficult to assess given her body habitus BMI 50   Skin:     General: Skin is warm and dry. Neurological:      General: No focal deficit present. Mental Status: She is alert and oriented to person, place, and time. This note was completed in part utilizing [employer] Direct Software. Grammatical errors, random word insertions, spelling mistakes, and incomplete sentences can be an occasional consequence of this system secondary to software limitations, ambient noise, and hardware issues. If you have any questions or concerns about the content, text, or information contained within the body of this dictation, please contact the provider for clarification.

## 2023-09-05 ENCOUNTER — OFFICE VISIT (OUTPATIENT)
Dept: FAMILY MEDICINE CLINIC | Facility: HOSPITAL | Age: 63
End: 2023-09-05
Payer: COMMERCIAL

## 2023-09-05 ENCOUNTER — HOSPITAL ENCOUNTER (INPATIENT)
Facility: HOSPITAL | Age: 63
LOS: 3 days | Discharge: HOME HEALTH | DRG: 194 | End: 2023-09-08
Attending: EMERGENCY MEDICINE | Admitting: STUDENT IN AN ORGANIZED HEALTH CARE EDUCATION/TRAINING PROGRAM
Payer: COMMERCIAL

## 2023-09-05 ENCOUNTER — PATIENT OUTREACH (OUTPATIENT)
Dept: FAMILY MEDICINE CLINIC | Facility: HOSPITAL | Age: 63
End: 2023-09-05

## 2023-09-05 ENCOUNTER — OFFICE VISIT (OUTPATIENT)
Age: 63
End: 2023-09-05
Payer: COMMERCIAL

## 2023-09-05 ENCOUNTER — APPOINTMENT (EMERGENCY)
Dept: RADIOLOGY | Facility: HOSPITAL | Age: 63
DRG: 194 | End: 2023-09-05
Payer: COMMERCIAL

## 2023-09-05 VITALS
OXYGEN SATURATION: 92 % | HEIGHT: 63 IN | WEIGHT: 287.2 LBS | TEMPERATURE: 98.2 F | HEART RATE: 74 BPM | BODY MASS INDEX: 50.89 KG/M2 | SYSTOLIC BLOOD PRESSURE: 118 MMHG | DIASTOLIC BLOOD PRESSURE: 72 MMHG

## 2023-09-05 VITALS
RESPIRATION RATE: 20 BRPM | SYSTOLIC BLOOD PRESSURE: 136 MMHG | TEMPERATURE: 96.9 F | HEIGHT: 63 IN | DIASTOLIC BLOOD PRESSURE: 70 MMHG | BODY MASS INDEX: 50.88 KG/M2 | HEART RATE: 79 BPM | OXYGEN SATURATION: 92 %

## 2023-09-05 DIAGNOSIS — J44.1 ASTHMA WITH COPD WITH EXACERBATION (HCC): ICD-10-CM

## 2023-09-05 DIAGNOSIS — G47.00 INSOMNIA, UNSPECIFIED TYPE: ICD-10-CM

## 2023-09-05 DIAGNOSIS — J45.901 ASTHMA WITH COPD WITH EXACERBATION (HCC): ICD-10-CM

## 2023-09-05 DIAGNOSIS — G47.33 OBSTRUCTIVE SLEEP APNEA: ICD-10-CM

## 2023-09-05 DIAGNOSIS — J96.12 CHRONIC HYPERCAPNIC RESPIRATORY FAILURE (HCC): Primary | ICD-10-CM

## 2023-09-05 DIAGNOSIS — I50.32 CHRONIC DIASTOLIC CONGESTIVE HEART FAILURE (HCC): ICD-10-CM

## 2023-09-05 DIAGNOSIS — J44.1 COPD EXACERBATION (HCC): ICD-10-CM

## 2023-09-05 DIAGNOSIS — D50.0 IRON DEFICIENCY ANEMIA DUE TO CHRONIC BLOOD LOSS: ICD-10-CM

## 2023-09-05 DIAGNOSIS — J96.21 ACUTE ON CHRONIC RESPIRATORY FAILURE WITH HYPOXIA (HCC): ICD-10-CM

## 2023-09-05 DIAGNOSIS — I27.20 PULMONARY HYPERTENSION (HCC): ICD-10-CM

## 2023-09-05 DIAGNOSIS — J44.1 COPD WITH ACUTE EXACERBATION (HCC): ICD-10-CM

## 2023-09-05 DIAGNOSIS — I48.0 PAROXYSMAL ATRIAL FIBRILLATION (HCC): ICD-10-CM

## 2023-09-05 DIAGNOSIS — I50.9 ACUTE EXACERBATION OF CHF (CONGESTIVE HEART FAILURE) (HCC): ICD-10-CM

## 2023-09-05 DIAGNOSIS — Z76.89 ENCOUNTER FOR SUPPORT AND COORDINATION OF TRANSITION OF CARE: Primary | ICD-10-CM

## 2023-09-05 DIAGNOSIS — J96.20: Primary | ICD-10-CM

## 2023-09-05 DIAGNOSIS — E66.01 MORBID OBESITY (HCC): ICD-10-CM

## 2023-09-05 PROBLEM — K92.2 LOWER GASTROINTESTINAL BLEEDING: Status: RESOLVED | Noted: 2021-06-01 | Resolved: 2023-09-05

## 2023-09-05 LAB
2HR DELTA HS TROPONIN: -1 NG/L
4HR DELTA HS TROPONIN: -1 NG/L
ALBUMIN SERPL BCP-MCNC: 4.1 G/DL (ref 3.5–5)
ALP SERPL-CCNC: 147 U/L (ref 34–104)
ALT SERPL W P-5'-P-CCNC: 21 U/L (ref 7–52)
ANION GAP SERPL CALCULATED.3IONS-SCNC: 7 MMOL/L
APTT PPP: 27 SECONDS (ref 23–37)
AST SERPL W P-5'-P-CCNC: 19 U/L (ref 13–39)
BACTERIA UR QL AUTO: ABNORMAL /HPF
BASE EXCESS BLDA CALC-SCNC: 9 MMOL/L (ref -2–3)
BASOPHILS # BLD AUTO: 0.05 THOUSANDS/ÂΜL (ref 0–0.1)
BASOPHILS NFR BLD AUTO: 1 % (ref 0–1)
BILIRUB SERPL-MCNC: 0.36 MG/DL (ref 0.2–1)
BILIRUB UR QL STRIP: NEGATIVE
BNP SERPL-MCNC: 99 PG/ML (ref 0–100)
BUN SERPL-MCNC: 11 MG/DL (ref 5–25)
CA-I BLD-SCNC: 1.14 MMOL/L (ref 1.12–1.32)
CALCIUM SERPL-MCNC: 9 MG/DL (ref 8.4–10.2)
CARDIAC TROPONIN I PNL SERPL HS: 4 NG/L
CARDIAC TROPONIN I PNL SERPL HS: 4 NG/L
CARDIAC TROPONIN I PNL SERPL HS: 5 NG/L
CHLORIDE SERPL-SCNC: 94 MMOL/L (ref 96–108)
CLARITY UR: CLEAR
CO2 SERPL-SCNC: 36 MMOL/L (ref 21–32)
COLOR UR: YELLOW
CREAT SERPL-MCNC: 0.57 MG/DL (ref 0.6–1.3)
EOSINOPHIL # BLD AUTO: 0.26 THOUSAND/ÂΜL (ref 0–0.61)
EOSINOPHIL NFR BLD AUTO: 2 % (ref 0–6)
ERYTHROCYTE [DISTWIDTH] IN BLOOD BY AUTOMATED COUNT: 24.3 % (ref 11.6–15.1)
FLUAV RNA RESP QL NAA+PROBE: NEGATIVE
FLUBV RNA RESP QL NAA+PROBE: NEGATIVE
GFR SERPL CREATININE-BSD FRML MDRD: 98 ML/MIN/1.73SQ M
GLUCOSE SERPL-MCNC: 273 MG/DL (ref 65–140)
GLUCOSE SERPL-MCNC: 277 MG/DL (ref 65–140)
GLUCOSE SERPL-MCNC: 288 MG/DL (ref 65–140)
GLUCOSE SERPL-MCNC: 433 MG/DL (ref 65–140)
GLUCOSE SERPL-MCNC: 443 MG/DL (ref 65–140)
GLUCOSE UR STRIP-MCNC: ABNORMAL MG/DL
HCO3 BLDA-SCNC: 35.9 MMOL/L (ref 24–30)
HCT VFR BLD AUTO: 38.3 % (ref 34.8–46.1)
HCT VFR BLD CALC: 37 % (ref 34.8–46.1)
HGB BLD-MCNC: 10.8 G/DL (ref 11.5–15.4)
HGB BLDA-MCNC: 12.6 G/DL (ref 11.5–15.4)
HGB UR QL STRIP.AUTO: NEGATIVE
HYALINE CASTS #/AREA URNS LPF: ABNORMAL /LPF
IMM GRANULOCYTES # BLD AUTO: 0.05 THOUSAND/UL (ref 0–0.2)
IMM GRANULOCYTES NFR BLD AUTO: 1 % (ref 0–2)
INR PPP: 1.07 (ref 0.84–1.19)
KETONES UR STRIP-MCNC: NEGATIVE MG/DL
LACTATE SERPL-SCNC: 2.5 MMOL/L (ref 0.5–2)
LACTATE SERPL-SCNC: 4.6 MMOL/L (ref 0.5–2)
LEUKOCYTE ESTERASE UR QL STRIP: ABNORMAL
LYMPHOCYTES # BLD AUTO: 1.39 THOUSANDS/ÂΜL (ref 0.6–4.47)
LYMPHOCYTES NFR BLD AUTO: 13 % (ref 14–44)
MAGNESIUM SERPL-MCNC: 2 MG/DL (ref 1.9–2.7)
MCH RBC QN AUTO: 20.8 PG (ref 26.8–34.3)
MCHC RBC AUTO-ENTMCNC: 28.2 G/DL (ref 31.4–37.4)
MCV RBC AUTO: 74 FL (ref 82–98)
MONOCYTES # BLD AUTO: 0.57 THOUSAND/ÂΜL (ref 0.17–1.22)
MONOCYTES NFR BLD AUTO: 5 % (ref 4–12)
NEUTROPHILS # BLD AUTO: 8.46 THOUSANDS/ÂΜL (ref 1.85–7.62)
NEUTS SEG NFR BLD AUTO: 78 % (ref 43–75)
NITRITE UR QL STRIP: NEGATIVE
NON-SQ EPI CELLS URNS QL MICRO: ABNORMAL /HPF
NRBC BLD AUTO-RTO: 0 /100 WBCS
PCO2 BLD: 38 MMOL/L (ref 21–32)
PCO2 BLD: 59 MM HG (ref 42–50)
PH BLD: 7.39 [PH] (ref 7.3–7.4)
PH UR STRIP.AUTO: 6 [PH]
PLATELET # BLD AUTO: 321 THOUSANDS/UL (ref 149–390)
PMV BLD AUTO: 11.3 FL (ref 8.9–12.7)
PO2 BLD: 45 MM HG (ref 35–45)
POTASSIUM BLD-SCNC: 3.9 MMOL/L (ref 3.5–5.3)
POTASSIUM SERPL-SCNC: 3.9 MMOL/L (ref 3.5–5.3)
PROCALCITONIN SERPL-MCNC: <0.05 NG/ML
PROT SERPL-MCNC: 6.9 G/DL (ref 6.4–8.4)
PROT UR STRIP-MCNC: NEGATIVE MG/DL
PROTHROMBIN TIME: 14.7 SECONDS (ref 11.6–14.5)
RBC # BLD AUTO: 5.19 MILLION/UL (ref 3.81–5.12)
RBC #/AREA URNS AUTO: ABNORMAL /HPF
RSV RNA RESP QL NAA+PROBE: NEGATIVE
SAO2 % BLD FROM PO2: 79 % (ref 60–85)
SARS-COV-2 RNA RESP QL NAA+PROBE: NEGATIVE
SODIUM BLD-SCNC: 137 MMOL/L (ref 136–145)
SODIUM SERPL-SCNC: 137 MMOL/L (ref 135–147)
SP GR UR STRIP.AUTO: 1.01 (ref 1–1.03)
SPECIMEN SOURCE: ABNORMAL
UROBILINOGEN UR STRIP-ACNC: <2 MG/DL
WBC # BLD AUTO: 10.78 THOUSAND/UL (ref 4.31–10.16)
WBC #/AREA URNS AUTO: ABNORMAL /HPF

## 2023-09-05 PROCEDURE — 71045 X-RAY EXAM CHEST 1 VIEW: CPT

## 2023-09-05 PROCEDURE — 85730 THROMBOPLASTIN TIME PARTIAL: CPT | Performed by: EMERGENCY MEDICINE

## 2023-09-05 PROCEDURE — 99291 CRITICAL CARE FIRST HOUR: CPT | Performed by: EMERGENCY MEDICINE

## 2023-09-05 PROCEDURE — 83735 ASSAY OF MAGNESIUM: CPT | Performed by: EMERGENCY MEDICINE

## 2023-09-05 PROCEDURE — 82803 BLOOD GASES ANY COMBINATION: CPT

## 2023-09-05 PROCEDURE — 85025 COMPLETE CBC W/AUTO DIFF WBC: CPT | Performed by: EMERGENCY MEDICINE

## 2023-09-05 PROCEDURE — 36415 COLL VENOUS BLD VENIPUNCTURE: CPT | Performed by: EMERGENCY MEDICINE

## 2023-09-05 PROCEDURE — 80053 COMPREHEN METABOLIC PANEL: CPT | Performed by: EMERGENCY MEDICINE

## 2023-09-05 PROCEDURE — 99495 TRANSJ CARE MGMT MOD F2F 14D: CPT | Performed by: NURSE PRACTITIONER

## 2023-09-05 PROCEDURE — 83605 ASSAY OF LACTIC ACID: CPT | Performed by: EMERGENCY MEDICINE

## 2023-09-05 PROCEDURE — 85014 HEMATOCRIT: CPT

## 2023-09-05 PROCEDURE — 84145 PROCALCITONIN (PCT): CPT | Performed by: EMERGENCY MEDICINE

## 2023-09-05 PROCEDURE — 94660 CPAP INITIATION&MGMT: CPT

## 2023-09-05 PROCEDURE — 84295 ASSAY OF SERUM SODIUM: CPT

## 2023-09-05 PROCEDURE — 94760 N-INVAS EAR/PLS OXIMETRY 1: CPT

## 2023-09-05 PROCEDURE — 99285 EMERGENCY DEPT VISIT HI MDM: CPT

## 2023-09-05 PROCEDURE — 99214 OFFICE O/P EST MOD 30 MIN: CPT | Performed by: PHYSICIAN ASSISTANT

## 2023-09-05 PROCEDURE — 94644 CONT INHLJ TX 1ST HOUR: CPT

## 2023-09-05 PROCEDURE — 82947 ASSAY GLUCOSE BLOOD QUANT: CPT

## 2023-09-05 PROCEDURE — 99223 1ST HOSP IP/OBS HIGH 75: CPT | Performed by: STUDENT IN AN ORGANIZED HEALTH CARE EDUCATION/TRAINING PROGRAM

## 2023-09-05 PROCEDURE — 84132 ASSAY OF SERUM POTASSIUM: CPT

## 2023-09-05 PROCEDURE — 93005 ELECTROCARDIOGRAM TRACING: CPT

## 2023-09-05 PROCEDURE — 94002 VENT MGMT INPAT INIT DAY: CPT

## 2023-09-05 PROCEDURE — 96375 TX/PRO/DX INJ NEW DRUG ADDON: CPT

## 2023-09-05 PROCEDURE — 84484 ASSAY OF TROPONIN QUANT: CPT | Performed by: EMERGENCY MEDICINE

## 2023-09-05 PROCEDURE — 82948 REAGENT STRIP/BLOOD GLUCOSE: CPT

## 2023-09-05 PROCEDURE — 0241U HB NFCT DS VIR RESP RNA 4 TRGT: CPT | Performed by: EMERGENCY MEDICINE

## 2023-09-05 PROCEDURE — 81001 URINALYSIS AUTO W/SCOPE: CPT | Performed by: EMERGENCY MEDICINE

## 2023-09-05 PROCEDURE — 96365 THER/PROPH/DIAG IV INF INIT: CPT

## 2023-09-05 PROCEDURE — 82330 ASSAY OF CALCIUM: CPT

## 2023-09-05 PROCEDURE — 85610 PROTHROMBIN TIME: CPT | Performed by: EMERGENCY MEDICINE

## 2023-09-05 PROCEDURE — 83880 ASSAY OF NATRIURETIC PEPTIDE: CPT | Performed by: EMERGENCY MEDICINE

## 2023-09-05 PROCEDURE — 94640 AIRWAY INHALATION TREATMENT: CPT

## 2023-09-05 PROCEDURE — 87040 BLOOD CULTURE FOR BACTERIA: CPT | Performed by: EMERGENCY MEDICINE

## 2023-09-05 RX ORDER — PANTOPRAZOLE SODIUM 40 MG/1
40 TABLET, DELAYED RELEASE ORAL
Status: DISCONTINUED | OUTPATIENT
Start: 2023-09-06 | End: 2023-09-08 | Stop reason: HOSPADM

## 2023-09-05 RX ORDER — INSULIN LISPRO 100 [IU]/ML
2-12 INJECTION, SOLUTION INTRAVENOUS; SUBCUTANEOUS
Status: DISCONTINUED | OUTPATIENT
Start: 2023-09-05 | End: 2023-09-08 | Stop reason: HOSPADM

## 2023-09-05 RX ORDER — MONTELUKAST SODIUM 10 MG/1
10 TABLET ORAL
Status: DISCONTINUED | OUTPATIENT
Start: 2023-09-05 | End: 2023-09-08 | Stop reason: HOSPADM

## 2023-09-05 RX ORDER — SODIUM CHLORIDE FOR INHALATION 0.9 %
12 VIAL, NEBULIZER (ML) INHALATION ONCE
Status: COMPLETED | OUTPATIENT
Start: 2023-09-05 | End: 2023-09-05

## 2023-09-05 RX ORDER — TRAZODONE HYDROCHLORIDE 150 MG/1
150 TABLET ORAL
Status: DISCONTINUED | OUTPATIENT
Start: 2023-09-05 | End: 2023-09-08 | Stop reason: HOSPADM

## 2023-09-05 RX ORDER — ATORVASTATIN CALCIUM 40 MG/1
40 TABLET, FILM COATED ORAL DAILY
Status: DISCONTINUED | OUTPATIENT
Start: 2023-09-06 | End: 2023-09-08 | Stop reason: HOSPADM

## 2023-09-05 RX ORDER — SENNOSIDES 8.6 MG
1 TABLET ORAL
Status: DISCONTINUED | OUTPATIENT
Start: 2023-09-05 | End: 2023-09-08 | Stop reason: HOSPADM

## 2023-09-05 RX ORDER — BUMETANIDE 0.25 MG/ML
2 INJECTION INTRAMUSCULAR; INTRAVENOUS DAILY
Status: DISCONTINUED | OUTPATIENT
Start: 2023-09-06 | End: 2023-09-06

## 2023-09-05 RX ORDER — ALBUTEROL SULFATE 2.5 MG/3ML
2.5 SOLUTION RESPIRATORY (INHALATION) EVERY 6 HOURS PRN
Status: DISCONTINUED | OUTPATIENT
Start: 2023-09-05 | End: 2023-09-06

## 2023-09-05 RX ORDER — INSULIN GLARGINE 100 [IU]/ML
35 INJECTION, SOLUTION SUBCUTANEOUS
Status: DISCONTINUED | OUTPATIENT
Start: 2023-09-06 | End: 2023-09-06

## 2023-09-05 RX ORDER — FLUTICASONE PROPIONATE 50 MCG
1 SPRAY, SUSPENSION (ML) NASAL 2 TIMES DAILY
Status: DISCONTINUED | OUTPATIENT
Start: 2023-09-05 | End: 2023-09-08 | Stop reason: HOSPADM

## 2023-09-05 RX ORDER — INSULIN GLARGINE 100 [IU]/ML
30 INJECTION, SOLUTION SUBCUTANEOUS
Status: DISCONTINUED | OUTPATIENT
Start: 2023-09-05 | End: 2023-09-05

## 2023-09-05 RX ORDER — METHYLPREDNISOLONE SODIUM SUCCINATE 40 MG/ML
40 INJECTION, POWDER, LYOPHILIZED, FOR SOLUTION INTRAMUSCULAR; INTRAVENOUS EVERY 12 HOURS SCHEDULED
Status: DISCONTINUED | OUTPATIENT
Start: 2023-09-06 | End: 2023-09-06

## 2023-09-05 RX ORDER — LEVALBUTEROL INHALATION SOLUTION 1.25 MG/3ML
1.25 SOLUTION RESPIRATORY (INHALATION)
Status: DISCONTINUED | OUTPATIENT
Start: 2023-09-05 | End: 2023-09-08 | Stop reason: HOSPADM

## 2023-09-05 RX ORDER — INSULIN LISPRO 100 [IU]/ML
15 INJECTION, SOLUTION INTRAVENOUS; SUBCUTANEOUS
Status: DISCONTINUED | OUTPATIENT
Start: 2023-09-05 | End: 2023-09-06

## 2023-09-05 RX ORDER — MAGNESIUM HYDROXIDE/ALUMINUM HYDROXICE/SIMETHICONE 120; 1200; 1200 MG/30ML; MG/30ML; MG/30ML
30 SUSPENSION ORAL EVERY 6 HOURS PRN
Status: DISCONTINUED | OUTPATIENT
Start: 2023-09-05 | End: 2023-09-08 | Stop reason: HOSPADM

## 2023-09-05 RX ORDER — POTASSIUM CHLORIDE 20 MEQ/1
40 TABLET, EXTENDED RELEASE ORAL DAILY
Status: DISCONTINUED | OUTPATIENT
Start: 2023-09-06 | End: 2023-09-08 | Stop reason: HOSPADM

## 2023-09-05 RX ORDER — ESCITALOPRAM OXALATE 20 MG/1
20 TABLET ORAL DAILY
Status: DISCONTINUED | OUTPATIENT
Start: 2023-09-06 | End: 2023-09-08 | Stop reason: HOSPADM

## 2023-09-05 RX ORDER — BUMETANIDE 0.25 MG/ML
1 INJECTION INTRAMUSCULAR; INTRAVENOUS ONCE
Status: COMPLETED | OUTPATIENT
Start: 2023-09-05 | End: 2023-09-05

## 2023-09-05 RX ORDER — MAGNESIUM SULFATE HEPTAHYDRATE 40 MG/ML
2 INJECTION, SOLUTION INTRAVENOUS ONCE
Status: COMPLETED | OUTPATIENT
Start: 2023-09-05 | End: 2023-09-05

## 2023-09-05 RX ORDER — METOPROLOL SUCCINATE 50 MG/1
100 TABLET, EXTENDED RELEASE ORAL DAILY
Status: DISCONTINUED | OUTPATIENT
Start: 2023-09-06 | End: 2023-09-08 | Stop reason: HOSPADM

## 2023-09-05 RX ORDER — ONDANSETRON 2 MG/ML
4 INJECTION INTRAMUSCULAR; INTRAVENOUS EVERY 6 HOURS PRN
Status: DISCONTINUED | OUTPATIENT
Start: 2023-09-05 | End: 2023-09-08 | Stop reason: HOSPADM

## 2023-09-05 RX ORDER — INSULIN LISPRO 100 [IU]/ML
1-6 INJECTION, SOLUTION INTRAVENOUS; SUBCUTANEOUS
Status: DISCONTINUED | OUTPATIENT
Start: 2023-09-05 | End: 2023-09-05

## 2023-09-05 RX ORDER — LORAZEPAM 0.5 MG/1
0.5 TABLET ORAL
Status: DISCONTINUED | OUTPATIENT
Start: 2023-09-05 | End: 2023-09-08 | Stop reason: HOSPADM

## 2023-09-05 RX ORDER — ACETAMINOPHEN 325 MG/1
650 TABLET ORAL EVERY 6 HOURS PRN
Status: DISCONTINUED | OUTPATIENT
Start: 2023-09-05 | End: 2023-09-08 | Stop reason: HOSPADM

## 2023-09-05 RX ORDER — INSULIN LISPRO 100 [IU]/ML
10 INJECTION, SOLUTION INTRAVENOUS; SUBCUTANEOUS ONCE
Status: COMPLETED | OUTPATIENT
Start: 2023-09-06 | End: 2023-09-06

## 2023-09-05 RX ORDER — FERROUS SULFATE 7.5 MG/0.5
150 SYRINGE (EA) ORAL DAILY
Status: DISCONTINUED | OUTPATIENT
Start: 2023-09-06 | End: 2023-09-08 | Stop reason: HOSPADM

## 2023-09-05 RX ORDER — METHYLPREDNISOLONE SODIUM SUCCINATE 125 MG/2ML
125 INJECTION, POWDER, LYOPHILIZED, FOR SOLUTION INTRAMUSCULAR; INTRAVENOUS ONCE
Status: COMPLETED | OUTPATIENT
Start: 2023-09-05 | End: 2023-09-05

## 2023-09-05 RX ORDER — BUMETANIDE 0.25 MG/ML
2 INJECTION INTRAMUSCULAR; INTRAVENOUS ONCE
Status: COMPLETED | OUTPATIENT
Start: 2023-09-05 | End: 2023-09-05

## 2023-09-05 RX ADMIN — ACETAMINOPHEN 650 MG: 325 TABLET, FILM COATED ORAL at 21:09

## 2023-09-05 RX ADMIN — METHYLPREDNISOLONE SODIUM SUCCINATE 125 MG: 125 INJECTION, POWDER, FOR SOLUTION INTRAMUSCULAR; INTRAVENOUS at 15:15

## 2023-09-05 RX ADMIN — TRAZODONE HYDROCHLORIDE 150 MG: 150 TABLET ORAL at 21:10

## 2023-09-05 RX ADMIN — LORAZEPAM 0.5 MG: 0.5 TABLET ORAL at 21:09

## 2023-09-05 RX ADMIN — IPRATROPIUM BROMIDE 0.5 MG: 0.5 SOLUTION RESPIRATORY (INHALATION) at 19:29

## 2023-09-05 RX ADMIN — LEVALBUTEROL HYDROCHLORIDE 1.25 MG: 1.25 SOLUTION RESPIRATORY (INHALATION) at 19:29

## 2023-09-05 RX ADMIN — BUMETANIDE 2 MG: 0.25 INJECTION INTRAMUSCULAR; INTRAVENOUS at 16:19

## 2023-09-05 RX ADMIN — Medication 12 ML: at 15:12

## 2023-09-05 RX ADMIN — INSULIN LISPRO 15 UNITS: 100 INJECTION, SOLUTION INTRAVENOUS; SUBCUTANEOUS at 19:08

## 2023-09-05 RX ADMIN — IPRATROPIUM BROMIDE 1 MG: 0.5 SOLUTION RESPIRATORY (INHALATION) at 15:12

## 2023-09-05 RX ADMIN — INSULIN LISPRO 6 UNITS: 100 INJECTION, SOLUTION INTRAVENOUS; SUBCUTANEOUS at 19:08

## 2023-09-05 RX ADMIN — BUMETANIDE 1 MG: 0.25 INJECTION INTRAMUSCULAR; INTRAVENOUS at 20:14

## 2023-09-05 RX ADMIN — MONTELUKAST 10 MG: 10 TABLET, FILM COATED ORAL at 21:10

## 2023-09-05 RX ADMIN — MAGNESIUM SULFATE HEPTAHYDRATE 2 G: 2 INJECTION, SOLUTION INTRAVENOUS at 15:16

## 2023-09-05 RX ADMIN — FLUTICASONE PROPIONATE 1 SPRAY: 50 SPRAY, METERED NASAL at 20:51

## 2023-09-05 RX ADMIN — APIXABAN 5 MG: 5 TABLET, FILM COATED ORAL at 18:23

## 2023-09-05 RX ADMIN — AZITHROMYCIN MONOHYDRATE 500 MG: 500 INJECTION, POWDER, LYOPHILIZED, FOR SOLUTION INTRAVENOUS at 16:20

## 2023-09-05 RX ADMIN — ALBUTEROL SULFATE 10 MG: 2.5 SOLUTION RESPIRATORY (INHALATION) at 15:12

## 2023-09-05 RX ADMIN — INSULIN LISPRO 12 UNITS: 100 INJECTION, SOLUTION INTRAVENOUS; SUBCUTANEOUS at 21:10

## 2023-09-05 RX ADMIN — INSULIN GLARGINE 30 UNITS: 100 INJECTION, SOLUTION SUBCUTANEOUS at 21:10

## 2023-09-05 NOTE — ASSESSMENT & PLAN NOTE
Lab Results   Component Value Date    HGBA1C 9.9 (H) 05/11/2023       No results for input(s): "POCGLU" in the last 72 hours. Blood Sugar Average: Last 72 hrs:     Outpatient regimen: Patient is on Lantus 30 units, Humalog 24 units with breakfast and 15 units with lunch and dinner, metformin and Ozempic. Inpatient regimen: Continue Lantus 30 units, will start on Premeal 15 units with sliding scale insulin. Started on hypoglycemia protocol. Carb counting diet.

## 2023-09-05 NOTE — H&P
[id number]  H&P  Name: Virginia Clinton 57 y.o. female I MRN: [id number]  Unit/Bed#: MS 318Iza I Date of Admission: 9/5/2023   Date of Service: 9/5/2023 I Hospital Day: 0      Assessment/Plan   * Acute on chronic diastolic congestive heart failure [facility])  Assessment & Plan  Wt Readings from Last 3 Encounters:   09/05/23 131 kg (289 lb 1.6 oz)   09/05/23 130 kg (287 lb 3.2 oz)   08/31/23 130 kg (287 lb)     · Patient was recently discharged from hospital on 8/22, since discharge noted to have 10 pound weight gain. · Chest x-ray concerning for vascular congestion. · She received IV Bumex in ED, will start on IV Bumex. · Monitor daily weights, ins and outs. COPD, severe ([address])  Assessment & Plan  · History noted, patient is on pembrolizumab, montelukast, Xopenex, Pulmicort and albuterol as needed at home. · Was evaluated by pulmonary in outpatient setting today, doubt COPD exacerbation. · Patient received Solu-Medrol in ED, started on IV steroids. · Low threshold to DC steroids. Morbid obesity ([address])  Assessment & Plan  Counseled on lifestyle modification including regular exercise and diet changes. Chronic hypercapnic respiratory failure [facility])  Assessment & Plan  Patient is on 4 L nasal cannula at home, on presentation remains on home O2 requirements. Continue O2 supplements to maintain oxygen saturation above 88%. Lactic acidosis  Assessment & Plan  Lactic acid 2.5, per chart review on her last admission she also noted to have lactic acidosis. Continue to trend. Paroxysmal atrial fibrillation (HCC)  Assessment & Plan  Rate controlled with metoprolol on Eliquis for anticoagulation. Continue same. Type 2 diabetes mellitus with stage 2 chronic kidney disease, with long-term current use of insulin (HCC)  Assessment & Plan  Lab Results   Component Value Date    HGBA1C 9.9 (H) 05/11/2023       No results for input(s): "POCGLU" in the last 72 hours.     Blood Sugar Average: Last 72 hrs:     Outpatient regimen: Patient is on Lantus 30 units, Humalog 24 units with breakfast and 15 units with lunch and dinner, metformin and Ozempic. Inpatient regimen: Continue Lantus 30 units, will start on Premeal 15 units with sliding scale insulin. Started on hypoglycemia protocol. Carb counting diet. Hypercholesterolemia  Assessment & Plan  Patient is on atorvastatin, continue same. Obstructive sleep apnea  Assessment & Plan  Uses CPAP at nighttime, continue same. Esophageal reflux  Assessment & Plan  Continue pantoprazole at home dosage. VTE Pharmacologic Prophylaxis: VTE Score: 7 Moderate Risk (Score 3-4) - Pharmacological DVT Prophylaxis Ordered: apixaban (Eliquis). Code Status: Level 1 - Full Code per pt  Discussion with family: Updated patient at bedside. .     Anticipated Length of Stay: Patient will be admitted on an inpatient basis with an anticipated length of stay of greater than 2 midnights secondary to Concerns for acute on chronic CHF exacerbation, on IV diuretics. .    Total Time Spent on Date of Encounter in care of patient: 85 minutes This time was spent on one or more of the following: performing physical exam; counseling and coordination of care; obtaining or reviewing history; documenting in the medical record; reviewing/ordering tests, medications or procedures; communicating with other healthcare professionals and discussing with patient's family/caregivers. Chief Complaint: Worsening shortness of breath. History of Present Illness:  Nicholas Edouard is a 57 y.o. female with a PMH of chronic respiratory failure on 4 L nasal cannula, severe COPD, CHF, diabetes mellitus and esophageal reflux who presents with worsening shortness of breath. Patient was recently admitted to hospital from 8/11 to 8/22 for COPD and CHF exacerbation.   She was discharged on oral torsemide per cardiology recommendation, per patient she has been compliant with all her medications since discharge. According to patient she has noticed worsening shortness of breath since last few days, that has gradually worsened from feeling on and off to persistently have feeling of difficulty breathing and few episodes of wheezing. Due to same she was seen by pulmonary in office today, and found to have 10 pounds weight gain since her discharge. Patient was referred to ED for further evaluation. On presentation to ED she remained hemodynamically stable, her oxygen requirements have been at baseline that is 4 L nasal cannula. Troponins and BNP unremarkable. Evidence of lactic acidosis and mild leukocytosis, procalcitonin within normal limit. Chest x-ray wet reading concerning for vascular congestion. Patient received 1 dose of Solu-Medrol, nebulizers and IV Bumex in ED with improvement in symptoms. ED physician had discussed with cardiology who recommended patient to be admitted in hospital for IV diuresis. Review of Systems:  Review of Systems   Constitutional: Positive for fatigue. Negative for activity change and appetite change. Respiratory: Positive for cough and shortness of breath. Negative for chest tightness. Cardiovascular: Negative for chest pain and palpitations. All other systems reviewed and are negative. Past Medical and Surgical History:   Past Medical History:   Diagnosis Date   • Acute blood loss anemia 6/1/2021   • Acute diverticulitis 5/2/2021   • Alcohol abuse 5/21/2020   • Anemia    • Atrial fibrillation [facility])    • Cervical radiculopathy    • CHF (congestive heart failure) (HCC)    • Chronic low back pain    • Chronic obstructive asthma ([address]) 2/20/2018   • Cigarette nicotine dependence without complication 64/25/0059    Quit 12/10/2019.     • Community acquired pneumonia 5/21/2020   • Depression with anxiety 3/9/2014   • Diabetes mellitus with hyperglycemia ([address])    • Diverticulitis 6/6/2021   • Elevated liver enzymes    • GERD (gastroesophageal reflux disease)    • Hypersomnolence 10/28/2020   • Hypokalemia 2018   • Lower gastrointestinal bleeding 2021   • Paresthesia of upper extremity    • Plantar fasciitis    • Restless legs syndrome 2014   • Sexual dysfunction    • Sleep apnea, obstructive    • Tenosynovitis of finger    • Tinea corporis    • Tobacco use 2018    Currently smoking 3-4 cigarettes daily   • Trigger middle finger of left hand 2017   • Trigger ring finger of left hand 2017   • Weakness of upper extremity        Past Surgical History:   Procedure Laterality Date   • ABDOMINAL SURGERY     • CARPAL TUNNEL RELEASE Bilateral    •  SECTION     • CHOLECYSTECTOMY      laparoscopic   • ESOPHAGOGASTRODUODENOSCOPY N/A 12/3/2018    Procedure: ESOPHAGOGASTRODUODENOSCOPY (EGD) AND COLONOSCOPY;  Surgeon: Maribell Metz MD;  Location: [facility];  Service: Gastroenterology   • GASTRIC BYPASS      for morbid obesity with gilda en y   • HERNIA REPAIR     • HYSTERECTOMY     • WY EXCISION GANGLION WRIST DORSAL/VOLAR PRIMARY Left 2017    Procedure: LONG FINGER GANGLION CYST EXCISION;  Surgeon: Jp Randall MD;  Location: [facility];  Service: Orthopedics   • WY TENDON SHEATH INCISION Left 2017    Procedure: Tucker Caras, RING, TRIGGER FINGER RELEASE;  Surgeon: Jp Randall MD;  Location: [facility];  Service: Orthopedics   • SHOULDER SURGERY Right        Meds/Allergies:  Prior to Admission medications    Medication Sig Start Date End Date Taking?  Authorizing Provider   atorvastatin (LIPITOR) 40 mg tablet Take 1 tablet (40 mg total) by mouth daily 3/29/23  Yes Fernando Connor MD   albuterol (2.5 mg/3 mL) 0.083 % nebulizer solution Take 3 mL (2.5 mg total) by nebulization every 6 (six) hours as needed for wheezing or shortness of breath 23   Jarrett Lazaro MD   albuterol (PROVENTIL HFA,VENTOLIN HFA) 90 mcg/act inhaler Inhale 2 puffs every 4 (four) hours as needed for wheezing 23   Jarrett Lazaro MD   apixaban (Eliquis) 5 mg Take 1 tablet (5 mg total) by mouth 2 (two) times a day 8/7/23   Timothy Stoddard MD   Benralizumab 30 MG/ML SOAJ Inject 1 mL under the skin every 28 days 6/8/23   Maia Duane, MD   Blood Glucose Monitoring Suppl (Leydi Contour Link 2. 4) w/Device KIT Test 3 times daily 8/31/23   Laith Olivares MD   budesonide (PULMICORT) 0.5 mg/2 mL nebulizer solution TAKE 2 ML (0.5 MG TOTAL) BY NEBULIZATION TWICE A DAY RINSE MOUTH AFTER USE 8/14/23   Maia Duane, MD   Contour Next Test test strip USE TO TEST BLOOD SUGAR 3 TIMES A DAY 6/28/23   Laith Olivares MD   [employer] Lancets Thin 26G MISC USE 3 (THREE) TIMES A DAY 5/4/22   Laith Olivares MD   EPINEPHrine (EPIPEN) 0.3 mg/0.3 mL SOAJ Inject 0.3 mL (0.3 mg total) into a muscle once for 1 dose 12/21/22 8/31/23  Suyapa Warren PA-C   escitalopram (LEXAPRO) 20 mg tablet TAKE 1 TABLET BY MOUTH EVERY DAY 7/27/23   Laith Olivares MD   ferrous sulfate 220 (44 Fe) mg/5 mL solution TAKE 5 ML (220 MG TOTAL) BY MOUTH 2 (TWO) TIMES A DAY BEFORE MEALS 2/1/22 9/5/23  Dimas Loza MD   fluticasone [facility]) 50 mcg/act nasal spray 1 spray into each nostril 2 (two) times a day 9/23/20   Maia Duane, MD   glycopyrrolate-formoterol (BEVESPI AEROSPHERE) 9-4.8 MCG/ACT inhaler Inhale 2 puffs 2 (two) times a day 6/8/23   Maia Duane, MD   insulin glulisine (Apidra SoloStar) 100 units/mL injection pen 24 UNITS BREAKFAST 15 UNITS AT LUNCH AND DINNER  Patient taking differently: 30 UNITS BREAKFAST 15 UNITS AT LUNCH AND DINNER 7/26/23   Sanjana Schmidt PA-C   Insulin Pen Needle (BD Pen Needle Love 2nd Gen) 32G X 4 MM MISC Use daily at bedtime Use 4 new needles daily .  2/3/22   Denisa Martinez MD   Lantus SoloStar 100 units/mL SOPN INJECT 0.3 ML (30 UNITS TOTAL) UNDER THE SKIN DAILY AT BEDTIME 8/14/23   Lianne Bertrand PA-C   levalbuterol (XOPENEX) 1.25 mg/0.5 mL nebulizer solution Take 0.5 mL (1.25 mg total) by nebulization 2 (two) times a day 11/16/20   Chata Gonzalez DO   loratadine (CLARITIN) 10 mg tablet Take by mouth    Historical Provider, MD   LORazepam (ATIVAN) 0.5 mg tablet TAKE 2 TABLETS BY MOUTH AT BEDTIME AND 1 EVERY 6 HOURS AS NEEDED FOR ANXIETY 8/23/23   Laith Olivares MD   metFORMIN (GLUCOPHAGE-XR) 500 mg 24 hr tablet TAKE 2 TABLETS BY MOUTH TWICE A DAY WITH FOOD 7/27/23   Laith Olivares MD   metoprolol succinate (TOPROL-XL) 100 mg 24 hr tablet Take 1 tablet (100 mg total) by mouth daily 3/29/23   Dominick Pacheco MD   montelukast (SINGULAIR) 10 mg tablet TAKE 1 TABLET BY MOUTH DAILY AT BEDTIME 9/16/22   Laith Olivares MD   naloxone (NARCAN) 4 mg/0.1 mL nasal spray Administer 1 spray into a nostril. If breathing does not return to normal or if breathing difficulty resumes after 2-3 minutes, give another dose in the other nostril using a new spray. 7/29/20   Laith Olivares MD   omeprazole (PriLOSEC) 40 MG capsule TAKE 1 CAPSULE (40 MG TOTAL) BY MOUTH DAILY. 6/25/23   Laith Olivares MD   potassium chloride (K-DUR,KLOR-CON) 20 mEq tablet Take 2 tablets (40 mEq total) by mouth daily Do not start before August 23, 2023. 8/23/23   Swathi Graff MD   potassium chloride (K-DUR,KLOR-CON) 20 mEq tablet Take 1 tablet (20 mEq total) by mouth every evening 8/22/23   Swathi Graff MD   semaglutide, 0.25 or 0.5 mg/dose, (Ozempic, 0.25 or 0.5 MG/DOSE,) 2 mg/3 mL injection pen Inject 0.75 mL (0.5 mg total) under the skin every 7 days 5/17/23   Sandhya Santos PA-C   Torsemide 40 MG TABS Take 80 mg by mouth 2 (two) times a day 9/1/23   Jing Bañuelos MD   traZODone (DESYREL) 150 mg tablet TAKE 1 TABLET BY MOUTH AT BEDTIME 7/27/23   Ventura Nelson MD     I have reviewed home medications with patient personally. Allergies:    Allergies   Allergen Reactions   • Iron Dextran Swelling   • Januvia [Sitagliptin] Shortness Of Breath   • Jardiance [Empagliflozin] Shortness Of Breath   • Clonazepam Other (See Comments)     Unknown reaction   • Codeine Itching and Other (See Comments)     itch;mary kay morphine,takes ultram @home   • Adhesive [Medical Tape] Itching     itching   • Effexor [Venlafaxine] Itching   • Lactose - Food Allergy GI Intolerance   • Oxycodone-Acetaminophen Anxiety   • Oxycodone-Acetaminophen Itching and Rash     Other reaction(s):  Other (See Comments)  (PERCOCET) MILD RASH, not allergic to Acetaminophen        Social History:  Marital Status: Significant Other   Occupation: n/a  Patient Pre-hospital Living Situation: Home  Patient Pre-hospital Level of Mobility: walks  Patient Pre-hospital Diet Restrictions: none  Substance Use History:   Social History     Substance and Sexual Activity   Alcohol Use Not Currently   • Alcohol/week: 20.0 standard drinks of alcohol   • Types: 20 Cans of beer per week    Comment: about 6 coors light every night, stopped in 2019     Social History     Tobacco Use   Smoking Status Former   • Packs/day: 0.25   • Years: 29.00   • Total pack years: 7.25   • Types: Cigarettes   • Start date: 200   • Quit date: 12/10/2019   • Years since quitting: 3.7   Smokeless Tobacco Never     Social History     Substance and Sexual Activity   Drug Use No       Family History:  Family History   Problem Relation Age of Onset   • Alzheimer's disease Mother    • Atrial fibrillation Mother    • Dementia Mother    • Heart disease Mother         heart problem   • Seizures Mother    • Parkinsonism Father    • Arthritis Father    • Atrial fibrillation Father    • No Known Problems Daughter    • Cri-du-chat syndrome Daughter    • Colon cancer Maternal Grandmother 80   • Diabetes type II Maternal Grandmother    • No Known Problems Brother    • No Known Problems Son    • Substance Abuse Neg Hx         mother,father   • Mental illness Neg Hx         mother,father   • Colon polyps Neg Hx        Physical Exam:     Vitals:   Blood Pressure: 123/57 (09/05/23 1734)  Pulse: 82 (09/05/23 1734)  Temperature: 97.9 °F (36.6 °C) (09/05/23 1734)  Temp Source: Oral (09/05/23 1734)  Respirations: 22 (09/05/23 1734)  Height: 5' 3" (160 cm) (09/05/23 1734)  Weight - Scale: 131 kg (289 lb 1.6 oz) (09/05/23 1734)  SpO2: 96 % (09/05/23 1734)    Physical Exam  Constitutional:       Appearance: Normal appearance. HENT:      Head: Normocephalic and atraumatic. Mouth/Throat:      Mouth: Mucous membranes are dry. Pharynx: Oropharynx is clear. Eyes:      Conjunctiva/sclera: Conjunctivae normal.      Pupils: Pupils are equal, round, and reactive to light. Cardiovascular:      Rate and Rhythm: Normal rate and regular rhythm. Pulses: Normal pulses. Heart sounds: Normal heart sounds. Pulmonary:      Comments: Mildly increased respiratory effort, decreased breath sounds bilaterally, no wheezing on exam.  Abdominal:      General: Abdomen is flat. Bowel sounds are normal.   Musculoskeletal:      Cervical back: Normal range of motion and neck supple. Right lower leg: Edema present. Left lower leg: Edema present. Neurological:      General: No focal deficit present. Mental Status: She is alert and oriented to person, place, and time.         Additional Data:     Lab Results:  Results from last 7 days   Lab Units 09/05/23  1514   WBC Thousand/uL 10.78*   HEMOGLOBIN g/dL 10.8*   HEMATOCRIT % 38.3   PLATELETS Thousands/uL 321   NEUTROS PCT % 78*   LYMPHS PCT % 13*   MONOS PCT % 5   EOS PCT % 2     Results from last 7 days   Lab Units 09/05/23  1514   SODIUM mmol/L 137   POTASSIUM mmol/L 3.9   CHLORIDE mmol/L 94*   CO2 mmol/L 36*   BUN mg/dL 11   CREATININE mg/dL 0.57*   ANION GAP mmol/L 7   CALCIUM mg/dL 9.0   ALBUMIN g/dL 4.1   TOTAL BILIRUBIN mg/dL 0.36   ALK PHOS U/L 147*   ALT U/L 21   AST U/L 19   GLUCOSE RANDOM mg/dL 277*     Results from last 7 days   Lab Units 09/05/23  1514   INR  1.07     Results from last 7 days   Lab Units 09/05/23  1818   POC GLUCOSE mg/dl 288*         Results from last 7 days   Lab Units 09/05/23  1514   LACTIC ACID mmol/L 2.5*   PROCALCITONIN ng/ml <0.05       Lines/Drains:  Invasive Devices     Peripheral Intravenous Line  Duration           Peripheral IV 09/05/23 Left;Proximal;Ventral (anterior) Forearm <1 day                    Imaging: Reviewed radiology reports from this admission including: chest xray  XR chest portable   ED Interpretation by Bri Darnell DO (09/05 2674)   Abnormal   Pulmonary vascular congestion unchanged from 8/16/2023 as interpreted by me independently           EKG and Other Studies Reviewed on Admission:   · EKG: NSR. HR 73.    ** Please Note: This note has been constructed using a voice recognition system.  **

## 2023-09-05 NOTE — ASSESSMENT & PLAN NOTE
Wt Readings from Last 3 Encounters:   09/05/23 130 kg (287 lb 3.2 oz)   08/31/23 130 kg (287 lb)   08/22/23 126 kg (278 lb 7.1 oz)     S/P hospital admission - cardiology f/u UTD

## 2023-09-05 NOTE — ED PROVIDER NOTES
History  Chief Complaint   Patient presents with   • Shortness of Breath     Pt c/o SOB x1 week, sent by pulmonology for admission. Pt states "this is my 4th admission this year". Patient is a 75-year-old female who presents with aggressively worsening shortness of breath. Patient states that sometimes she has wheezing episodes, but overall she just feels persistently more short of breath with each day. She states that she has gained approximately 10 pounds since her discharge. She reports being compliant with her diuretics. Denies cough, congestion, fevers, chills, chest pain, headedness, dizziness. Prior to Admission Medications   Prescriptions Last Dose Informant Patient Reported? Taking? Benralizumab 30 MG/ML SOAJ  Self No No   Sig: Inject 1 mL under the skin every 28 days   Blood Glucose Monitoring Suppl (120 [employer] Contour Link 2. 4) w/Device KIT  Self No No   Sig: Test 3 times daily   [employer] Lancets Thin 26G MISC  Self No No   Sig: USE 3 (THREE) TIMES A DAY   Contour Next Test test strip  Self No No   Sig: USE TO TEST BLOOD SUGAR 3 TIMES A DAY   EPINEPHrine (EPIPEN) 0.3 mg/0.3 mL SOAJ  Self No No   Sig: Inject 0.3 mL (0.3 mg total) into a muscle once for 1 dose   Insulin Pen Needle (BD Pen Needle Love 2nd Gen) 32G X 4 MM MISC  Self No No   Sig: Use daily at bedtime Use 4 new needles daily .    LORazepam (ATIVAN) 0.5 mg tablet  Self No No   Sig: TAKE 2 TABLETS BY MOUTH AT BEDTIME AND 1 EVERY 6 HOURS AS NEEDED FOR ANXIETY   Lantus SoloStar 100 units/mL SOPN  Self No No   Sig: INJECT 0.3 ML (30 UNITS TOTAL) UNDER THE SKIN DAILY AT BEDTIME   Torsemide 40 MG TABS  Self No No   Sig: Take 80 mg by mouth 2 (two) times a day   albuterol (2.5 mg/3 mL) 0.083 % nebulizer solution  Self No No   Sig: Take 3 mL (2.5 mg total) by nebulization every 6 (six) hours as needed for wheezing or shortness of breath   albuterol (PROVENTIL HFA,VENTOLIN HFA) 90 mcg/act inhaler  Self No No   Sig: Inhale 2 puffs every 4 (four) hours as needed for wheezing   apixaban (Eliquis) 5 mg  Self No No   Sig: Take 1 tablet (5 mg total) by mouth 2 (two) times a day   atorvastatin (LIPITOR) 40 mg tablet  Self No No   Sig: Take 1 tablet (40 mg total) by mouth daily   budesonide (PULMICORT) 0.5 mg/2 mL nebulizer solution  Self No No   Sig: TAKE 2 ML (0.5 MG TOTAL) BY NEBULIZATION TWICE A DAY RINSE MOUTH AFTER USE   escitalopram (LEXAPRO) 20 mg tablet  Self No No   Sig: TAKE 1 TABLET BY MOUTH EVERY DAY   ferrous sulfate 220 (44 Fe) mg/5 mL solution  Self No No   Sig: TAKE 5 ML (220 MG TOTAL) BY MOUTH 2 (TWO) TIMES A DAY BEFORE MEALS   fluticasone (FLONASE) 50 mcg/act nasal spray  Self No No   Si spray into each nostril 2 (two) times a day   glycopyrrolate-formoterol (BEVESPI AEROSPHERE) 9-4.8 MCG/ACT inhaler  Self No No   Sig: Inhale 2 puffs 2 (two) times a day   insulin glulisine (Apidra SoloStar) 100 units/mL injection pen  Self No No   Si UNITS BREAKFAST 15 UNITS AT LUNCH AND DINNER   levalbuterol (XOPENEX) 1.25 mg/0.5 mL nebulizer solution  Self No No   Sig: Take 0.5 mL (1.25 mg total) by nebulization 2 (two) times a day   loratadine (CLARITIN) 10 mg tablet  Self Yes No   Sig: Take by mouth   metFORMIN (GLUCOPHAGE-XR) 500 mg 24 hr tablet  Self No No   Sig: TAKE 2 TABLETS BY MOUTH TWICE A DAY WITH FOOD   metoprolol succinate (TOPROL-XL) 100 mg 24 hr tablet  Self No No   Sig: Take 1 tablet (100 mg total) by mouth daily   montelukast (SINGULAIR) 10 mg tablet  Self No No   Sig: TAKE 1 TABLET BY MOUTH DAILY AT BEDTIME   naloxone (NARCAN) 4 mg/0.1 mL nasal spray  Self No No   Sig: Administer 1 spray into a nostril. If breathing does not return to normal or if breathing difficulty resumes after 2-3 minutes, give another dose in the other nostril using a new spray. omeprazole (PriLOSEC) 40 MG capsule  Self No No   Sig: TAKE 1 CAPSULE (40 MG TOTAL) BY MOUTH DAILY.    potassium chloride (K-DUR,KLOR-CON) 20 mEq tablet  Self No No   Sig: Take 2 tablets (40 mEq total) by mouth daily Do not start before 2023. potassium chloride (K-DUR,KLOR-CON) 20 mEq tablet  Self No No   Sig: Take 1 tablet (20 mEq total) by mouth every evening   semaglutide, 0.25 or 0.5 mg/dose, (Ozempic, 0.25 or 0.5 MG/DOSE,) 2 mg/3 mL injection pen  Self No No   Sig: Inject 0.75 mL (0.5 mg total) under the skin every 7 days   traZODone (DESYREL) 150 mg tablet  Self No No   Sig: TAKE 1 TABLET BY MOUTH AT BEDTIME      Facility-Administered Medications: None       Past Medical History:   Diagnosis Date   • Acute blood loss anemia 2021   • Acute diverticulitis 2021   • Alcohol abuse 2020   • Anemia    • Atrial fibrillation (HCC)    • Cervical radiculopathy    • CHF (congestive heart failure) ([address])    • Chronic low back pain    • Chronic obstructive asthma ([address]) 2018   • Cigarette nicotine dependence without complication     Quit 12/10/2019.     • Community acquired pneumonia 2020   • Depression with anxiety 3/9/2014   • Diabetes mellitus with hyperglycemia ([address])    • Diverticulitis 2021   • Elevated liver enzymes    • GERD (gastroesophageal reflux disease)    • Hypersomnolence 10/28/2020   • Hypokalemia 2018   • Lower gastrointestinal bleeding 2021   • Paresthesia of upper extremity    • Plantar fasciitis    • Restless legs syndrome 2014   • Sexual dysfunction    • Sleep apnea, obstructive    • Tenosynovitis of finger    • Tinea corporis    • Tobacco use 2018    Currently smoking 3-4 cigarettes daily   • Trigger middle finger of left hand 2017   • Trigger ring finger of left hand 2017   • Weakness of upper extremity        Past Surgical History:   Procedure Laterality Date   • ABDOMINAL SURGERY     • CARPAL TUNNEL RELEASE Bilateral    •  SECTION     • CHOLECYSTECTOMY      laparoscopic   • ESOPHAGOGASTRODUODENOSCOPY N/A 12/3/2018    Procedure: ESOPHAGOGASTRODUODENOSCOPY (EGD) AND COLONOSCOPY;  Surgeon: Gabbie Mendez Tejal Prieto MD;  Location: [facility];  Service: Gastroenterology   • GASTRIC BYPASS      for morbid obesity with gilda en y   • HERNIA REPAIR     • HYSTERECTOMY     • MD EXCISION GANGLION WRIST DORSAL/VOLAR PRIMARY Left 12/14/2017    Procedure: LONG FINGER GANGLION CYST EXCISION;  Surgeon: Yadira Monge MD;  Location: [facility];  Service: Orthopedics   • MD TENDON SHEATH INCISION Left 12/14/2017    Procedure: Espinoza Bennett, RING, TRIGGER FINGER RELEASE;  Surgeon: Yadira Monge MD;  Location: [facility];  Service: Orthopedics   • SHOULDER SURGERY Right        Family History   Problem Relation Age of Onset   • Alzheimer's disease Mother    • Atrial fibrillation Mother    • Dementia Mother    • Heart disease Mother         heart problem   • Seizures Mother    • Parkinsonism Father    • Arthritis Father    • Atrial fibrillation Father    • No Known Problems Daughter    • Cri-du-chat syndrome Daughter    • Colon cancer Maternal Grandmother 80   • Diabetes type II Maternal Grandmother    • No Known Problems Brother    • No Known Problems Son    • Substance Abuse Neg Hx         mother,father   • Mental illness Neg Hx         mother,father   • Colon polyps Neg Hx      I have reviewed and agree with the history as documented.     E-Cigarette/Vaping   • E-Cigarette Use Never User      E-Cigarette/Vaping Substances   • Nicotine No    • THC No    • CBD No    • Flavoring No    • Other No    • Unknown No      Social History     Tobacco Use   • Smoking status: Former     Packs/day: 0.25     Years: 29.00     Total pack years: 7.25     Types: Cigarettes     Start date: 200     Quit date: 12/10/2019     Years since quitting: 3.7   • Smokeless tobacco: Never   Vaping Use   • Vaping Use: Never used   Substance Use Topics   • Alcohol use: Not Currently     Alcohol/week: 20.0 standard drinks of alcohol     Types: 20 Cans of beer per week     Comment: about 6 coors light every night, stopped in 2019   • Drug use: No       Review of Systems   Constitutional: Negative for chills and fever. HENT: Negative for congestion. Respiratory: Positive for shortness of breath and wheezing. Negative for cough and chest tightness. Cardiovascular: Positive for leg swelling. Negative for chest pain and palpitations. Gastrointestinal: Negative for abdominal pain, nausea and vomiting. Musculoskeletal: Negative for back pain, neck pain and neck stiffness. Neurological: Negative for dizziness, light-headedness and headaches. Physical Exam  Physical Exam  Vitals and nursing note reviewed. Constitutional:       General: She is not in acute distress. Appearance: Normal appearance. She is well-developed. She is obese. She is ill-appearing. She is not toxic-appearing or diaphoretic. Comments: Chronically ill-appearing   HENT:      Head: Normocephalic and atraumatic. Mouth/Throat:      Mouth: Mucous membranes are moist.   Eyes:      Conjunctiva/sclera: Conjunctivae normal.      Pupils: Pupils are equal, round, and reactive to light. Cardiovascular:      Rate and Rhythm: Normal rate and regular rhythm. Pulses: Normal pulses. Heart sounds: Normal heart sounds. No murmur heard. Pulmonary:      Effort: Tachypnea and respiratory distress present. No bradypnea or accessory muscle usage. Breath sounds: No stridor. Examination of the right-middle field reveals decreased breath sounds. Examination of the left-middle field reveals decreased breath sounds. Examination of the right-lower field reveals decreased breath sounds. Examination of the left-lower field reveals decreased breath sounds. Decreased breath sounds present. No wheezing, rhonchi or rales. Comments: On 4L O2 via NC  Chest:      Chest wall: No tenderness. Abdominal:      General: Bowel sounds are normal. There is no distension. Palpations: Abdomen is soft. Tenderness: There is no abdominal tenderness. There is no guarding or rebound. Musculoskeletal:      Right lower leg: Edema present. Left lower leg: Edema present. Skin:     General: Skin is warm and dry. Neurological:      General: No focal deficit present. Mental Status: She is alert and oriented to person, place, and time. Mental status is at baseline.    Psychiatric:         Mood and Affect: Mood normal.         Behavior: Behavior normal.         Vital Signs  ED Triage Vitals [09/05/23 1438]   Temperature Pulse Respirations Blood Pressure SpO2   98.5 °F (36.9 °C) 86 20 115/70 92 %      Temp Source Heart Rate Source Patient Position - Orthostatic VS BP Location FiO2 (%)   Temporal Monitor Sitting Right arm --      Pain Score       --           Vitals:    09/05/23 1438 09/05/23 1530 09/05/23 1600   BP: 115/70 120/58 121/58   Pulse: 86 74 75   Patient Position - Orthostatic VS: Sitting Sitting Sitting         Visual Acuity      ED Medications  Medications   azithromycin (ZITHROMAX) 500 mg in sodium chloride 0.9% 250mL IVPB 500 mg (500 mg Intravenous New Bag 9/5/23 1620)   albuterol inhalation solution 10 mg (10 mg Nebulization Given 9/5/23 1512)   ipratropium (ATROVENT) 0.02 % inhalation solution 1 mg (1 mg Nebulization Given 9/5/23 1512)   sodium chloride 0.9 % inhalation solution 12 mL (12 mL Nebulization Given 9/5/23 1512)   methylPREDNISolone sodium succinate (Solu-MEDROL) injection 125 mg (125 mg Intravenous Given 9/5/23 1515)   magnesium sulfate 2 g/50 mL IVPB (premix) 2 g (0 g Intravenous Stopped 9/5/23 1556)   bumetanide (BUMEX) injection 2 mg (2 mg Intravenous Given 9/5/23 1619)       Diagnostic Studies  Results Reviewed     Procedure Component Value Units Date/Time    HS Troponin I 4hr [[id number]]     Lab Status: No result Specimen: Blood     FLU/RSV/COVID - if FLU/RSV clinically relevant [[id number]]  (Normal) Collected: 09/05/23 1514    Lab Status: Final result Specimen: Nares from Nose Updated: 09/05/23 1602     SARS-CoV-2 Negative     INFLUENZA A PCR Negative INFLUENZA B PCR Negative     RSV PCR Negative    Narrative:      FOR PEDIATRIC PATIENTS - copy/paste COVID Guidelines URL to browser: [email or web address]    SARS-CoV-2 assay is a Nucleic Acid Amplification assay intended for the  qualitative detection of nucleic acid from SARS-CoV-2 in nasopharyngeal  swabs. Results are for the presumptive identification of SARS-CoV-2 RNA. Positive results are indicative of infection with SARS-CoV-2, the virus  causing COVID-19, but do not rule out bacterial infection or co-infection  with other viruses. Laboratories within the [facility] and its  territories are required to report all positive results to the appropriate  public health authorities. Negative results do not preclude SARS-CoV-2  infection and should not be used as the sole basis for treatment or other  patient management decisions. Negative results must be combined with  clinical observations, patient history, and epidemiological information. This test has not been FDA cleared or approved. This test has been authorized by FDA under an Emergency Use Authorization  (EUA). This test is only authorized for the duration of time the  declaration that circumstances exist justifying the authorization of the  emergency use of an in vitro diagnostic tests for detection of SARS-CoV-2  virus and/or diagnosis of COVID-19 infection under section 564(b)(1) of  the Act, 21 U. S.C. 936NPA-1(I)(5), unless the authorization is terminated  or revoked sooner. The test has been validated but independent review by FDA  and CLIA is pending. Test performed using [employer] GeneXpert: This RT-PCR assay targets N2,  a region unique to SARS-CoV-2. A conserved region in the E-gene was chosen  for pan-Sarbecovirus detection which includes SARS-CoV-2. According to CMS-2020-01-R, this platform meets the definition of high-throughput technology.     Procalcitonin [[id number]]  (Normal) Collected: 09/05/23 1514    Lab Status: Final result Specimen: Blood from Arm, Left Updated: 09/05/23 1555     Procalcitonin <0.05 ng/ml     B-Type Natriuretic Peptide(BNP) [[id number]]  (Normal) Collected: 09/05/23 1514    Lab Status: Final result Specimen: Blood from Arm, Left Updated: 09/05/23 1553     BNP 99 pg/mL     HS Troponin 0hr (reflex protocol) [[id number]]  (Normal) Collected: 09/05/23 1514    Lab Status: Final result Specimen: Blood from Arm, Left Updated: 09/05/23 1552     hs TnI 0hr 5 ng/L     HS Troponin I 2hr [[id number]]     Lab Status: No result Specimen: Blood     Lactic acid [[id number]]  (Abnormal) Collected: 09/05/23 1514    Lab Status: Final result Specimen: Blood from Arm, Left Updated: 09/05/23 1547     LACTIC ACID 2.5 mmol/L     Narrative:      Result may be elevated if tourniquet was used during collection.     Lactic acid 2 Hours [[id number]]     Lab Status: No result Specimen: Blood     Comprehensive metabolic panel [[id number]]  (Abnormal) Collected: 09/05/23 1514    Lab Status: Final result Specimen: Blood from Arm, Left Updated: 09/05/23 1545     Sodium 137 mmol/L      Potassium 3.9 mmol/L      Chloride 94 mmol/L      CO2 36 mmol/L      ANION GAP 7 mmol/L      BUN 11 mg/dL      Creatinine 0.57 mg/dL      Glucose 277 mg/dL      Calcium 9.0 mg/dL      AST 19 U/L      ALT 21 U/L      Alkaline Phosphatase 147 U/L      Total Protein 6.9 g/dL      Albumin 4.1 g/dL      Total Bilirubin 0.36 mg/dL      eGFR 98 ml/min/1.73sq m     Narrative:      [place] guidelines for Chronic Kidney Disease (CKD):   •  Stage 1 with normal or high GFR (GFR > 90 mL/min/1.73 square meters)  •  Stage 2 Mild CKD (GFR = 60-89 mL/min/1.73 square meters)  •  Stage 3A Moderate CKD (GFR = 45-59 mL/min/1.73 square meters)  •  Stage 3B Moderate CKD (GFR = 30-44 mL/min/1.73 square meters)  •  Stage 4 Severe CKD (GFR = 15-29 mL/min/1.73 square meters)  •  Stage 5 End Stage CKD (GFR <15 mL/min/1.73 square meters)  Note: GFR calculation is accurate only with a steady state creatinine    Magnesium [[id number]]  (Normal) Collected: 09/05/23 1514    Lab Status: Final result Specimen: Blood from Arm, Left Updated: 09/05/23 1545     Magnesium 2.0 mg/dL     Protime-INR [[id number]]  (Abnormal) Collected: 09/05/23 1514    Lab Status: Final result Specimen: Blood from Arm, Left Updated: 09/05/23 1541     Protime 14.7 seconds      INR 1.07    APTT [[id number]]  (Normal) Collected: 09/05/23 1514    Lab Status: Final result Specimen: Blood from Arm, Left Updated: 09/05/23 1541     PTT 27 seconds     CBC and differential [[id number]]  (Abnormal) Collected: 09/05/23 1514    Lab Status: Final result Specimen: Blood from Arm, Left Updated: 09/05/23 1527     WBC 10.78 Thousand/uL      RBC 5.19 Million/uL      Hemoglobin 10.8 g/dL      Hematocrit 38.3 %      MCV 74 fL      MCH 20.8 pg      MCHC 28.2 g/dL      RDW 24.3 %      MPV 11.3 fL      Platelets 161 Thousands/uL      nRBC 0 /100 WBCs      Neutrophils Relative 78 %      Immat GRANS % 1 %      Lymphocytes Relative 13 %      Monocytes Relative 5 %      Eosinophils Relative 2 %      Basophils Relative 1 %      Neutrophils Absolute 8.46 Thousands/µL      Immature Grans Absolute 0.05 Thousand/uL      Lymphocytes Absolute 1.39 Thousands/µL      Monocytes Absolute 0.57 Thousand/µL      Eosinophils Absolute 0.26 Thousand/µL      Basophils Absolute 0.05 Thousands/µL     Blood culture #2 [[id number]] Collected: 09/05/23 1514    Lab Status: In process Specimen: Blood from Arm, Left Updated: 09/05/23 1523    Blood culture #1 [[id number]] Collected: 09/05/23 1514    Lab Status:  In process Specimen: Blood from Arm, Left Updated: 09/05/23 1523    POCT Blood Gas (CG8+) [[id number]]  (Abnormal) Collected: 09/05/23 1511    Lab Status: Final result Specimen: Venous Updated: 09/05/23 1514     ph, Brian ISTAT 7.392     pCO2, Brian i-STAT 59.0 mm HG      pO2, Brian i-STAT 45.0 mm HG      BE, i-STAT 9 mmol/L HCO3, Brian i-STAT 35.9 mmol/L      CO2, i-STAT 38 mmol/L      O2 Sat, i-STAT 79 %      SODIUM, I-STAT 137 mmol/l      Potassium, i-STAT 3.9 mmol/L      Calcium, Ionized i-STAT 1.14 mmol/L      Hct, i-STAT 37 %      Hgb, i-STAT 12.6 g/dl      Glucose, i-STAT 273 mg/dl      Specimen Type VENOUS    UA w Reflex to Microscopic w Reflex to Culture [[id number]]     Lab Status: No result Specimen: Urine                  XR chest portable   ED Interpretation by Patience Bishop DO (09/05 6298)   Abnormal   Pulmonary vascular congestion unchanged from 8/16/2023 as interpreted by me independently                  Procedures  ECG 12 Lead Documentation Only    Date/Time: 9/5/2023 2:53 PM    Performed by: Patience Bishop DO  Authorized by: Patience Bishop DO    Indications / Diagnosis:  SOB  ECG reviewed by me, the ED Provider: yes    Patient location:  ED  Previous ECG:     Previous ECG:  Compared to current    Comparison ECG info:  8/11/2023    Similarity:  No change  Interpretation:     Interpretation: normal    Rate:     ECG rate:  73    ECG rate assessment: normal    Rhythm:     Rhythm: sinus rhythm    Ectopy:     Ectopy: none    QRS:     QRS axis:  Normal    QRS intervals:  Normal  Conduction:     Conduction: normal    ST segments:     ST segments:  Normal  T waves:     T waves: normal    Comments:      qtc [id number]  CriticalCare Time    Date/Time: 9/5/2023 4:10 PM    Performed by: Patience Bishop DO  Authorized by: Patience Bishop DO    Critical care provider statement:     Critical care time (minutes):  45    Critical care start time:  9/5/2023 2:41 PM    Critical care end time:  9/5/2023 4:10 PM    Critical care time was exclusive of:  Separately billable procedures and treating other patients and teaching time    Critical care was necessary to treat or prevent imminent or life-threatening deterioration of the following conditions:  Respiratory failure    Critical care was time spent personally by me on the following activities:  Blood draw for specimens, obtaining history from patient or surrogate, development of treatment plan with patient or surrogate, discussions with consultants, discussions with primary provider, evaluation of patient's response to treatment, examination of patient, review of old charts, re-evaluation of patient's condition, ordering and review of radiographic studies, ordering and review of laboratory studies and ordering and performing treatments and interventions    I assumed direction of critical care for this patient from another provider in my specialty: no               ED Course  ED Course as of 09/05/23 1706   Tue Sep 05, 2023   1543 Patient reassessed. She is 75% done with the hour-long breathing treatment. She is moving more air and looks less tachypneic. 1558 LACTIC ACID(!!): 2.5  Infectious etiology less likely as compared to combination of CHF and COPD exacerbation. 1606 After discussion with Dr. Raymond Mercedes, cardiology, he recommends 2 mg of bumex rather than 120mg lasix, so will change at this time. Medical Decision Making  Assessment and plan:  Differential includes COPD exacerbation versus CHF exacerbation versus multifactorial due to both versus pneumonia less likely versus. Check labs to evaluate for leukocytosis, anemia, electrolyte abnormalities, kidney and liver function; chest x-ray to evaluate for pulmonary vascular congestion as well as pneumonia; EKG/troponin to evaluate for arrhythmia/ischemia. On exam, patient has diminished air movement. Will start with hour-long nebulizer treatment plus steroids plus magnesium. Reassess. We will also need to treat with diuretics for volume overload which I will discuss with cardiology as patient is on 80 mg of torsemide twice daily and will need a significant dose of diuretic to make meaningful impact.     Review of medical records, specifically from discharge summary from 8/22/2023 shows that the patient has a past medical history significant for chronic diastolic congestive heart failure, severe obesity, paroxysmal atrial fibrillation on chronic anticoagulation with Eliquis, chronic respiratory failure with hypoxia due to chronic obstructive pulmonary disease; diabetes; chronic kidney disease stage II; struct of sleep apnea. Amount and/or Complexity of Data Reviewed  Labs: ordered. Decision-making details documented in ED Course. Radiology: ordered and independent interpretation performed. Risk  Prescription drug management. Decision regarding hospitalization. Disposition  Final diagnoses:   Acute-on-chronic respiratory failure ([address])   COPD with acute exacerbation ([address])   Acute exacerbation of CHF (congestive heart failure) ([address])     Time reflects when diagnosis was documented in both MDM as applicable and the Disposition within this note     Time User Action Codes Description Comment    9/5/2023  2:52 PM Elizabet Tang [J96.20] Acute-on-chronic respiratory failure ([address])     9/5/2023  3:59 PM Elizabet Tang [J44.1] COPD with acute exacerbation ([address])     9/5/2023  3:59 PM Kathi Arrieta, [address] [I50.9] Acute exacerbation of CHF (congestive heart failure) [facility])       ED Disposition     ED Disposition   Admit    Condition   Stable    Date/Time   Tue Sep 5, 2023  2:52 PM    Comment   Case was discussed with hospitalist and the patient's admission status was agreed to be Admission Status: inpatient status to the service of Dr. Alka Burt . Follow-up Information    None         Patient's Medications   Discharge Prescriptions    No medications on file       No discharge procedures on file.     PDMP Review       Value Time User    PDMP Reviewed  Yes 8/23/2023  2:59 PM Bernadine Grijalva MD          ED Provider  Electronically Signed by           Elizabet King DO  09/05/23 0277

## 2023-09-05 NOTE — ASSESSMENT & PLAN NOTE
She remains clinically symptomatic s/p hospital admission  F/U with pulmonary as scheduled this afternoon  Continue 4L O2 via nasal cannula

## 2023-09-05 NOTE — PROGRESS NOTES
Lew GrassCora Skiff      :       MRN: [id number]  Encounter Provider: VICKY Delgado  Encounter Date: 2023   Encounter department: [facility] [id number]     Assessment & Plan     1. Encounter for support and coordination of transition of care  Comments:  Hospital records reviewed/discussed and TCM updated    2. Acute on chronic respiratory failure with hypoxia (HCC)  Assessment & Plan:  She remains SOB w/RICCI s/p hospital admission  She is compliant w/using 4L O2 via nasal cannula  Pulmonary appt scheduled this afternoon - f/u as planned      3. COPD exacerbation ([address] )  Assessment & Plan:  She remains clinically symptomatic s/p hospital admission  F/U with pulmonary as scheduled this afternoon  Continue 4L O2 via nasal cannula      4. Chronic diastolic congestive heart failure (HCC)  Assessment & Plan:  Wt Readings from Last 3 Encounters:   23 130 kg (287 lb 3.2 oz)   23 130 kg (287 lb)   23 126 kg (278 lb 7.1 oz)     S/P hospital admission - cardiology f/u UTD      5. Insomnia, unspecified type  Assessment & Plan:  Continue lorazepam as needed for sleep - rx'd by PCP           Subjective      Here for TCM/hospital follow up. Discharged to home on . States she is not better since being home. Continues w/RICCI and SOB. Using O2 at 4L continuously. Can't tolerate walking short distances w/worse SOB (ie, answering phone, cooking meals). Monitors weight and states she is not gaining. States she is limiting salt in diet. TCM Call     Date and time call was made  2023  9:29 AM    Hospital care reviewed  Records reviewed    Patient was hospitialized at  [address] [place] [address]    Date of Admission  23    Date of discharge  23    Diagnosis  COPD exacerbation    Disposition  Home    Were the patients medications reviewed and updated  No    Current Symptoms  Weakness;  Fatigue    Cough Severity  Mild    Loose stool severity  Severe Shortness of breath severity  Moderate  Advised to follow-up with pulmonary    Weakness severity  Moderate    Neausea severity  Moderate    Fatigue severity  Moderate      TCM Call     Post hospital issues  None    Should patient be enrolled in anticoag monitoring? No    Scheduled for follow up? Yes    Patients specialists  Pulmonlolgist    Referrals needed  GI   6 weeks    Did you obtain your prescribed medications  Yes    Do you need help managing your prescriptions or medications  No    Is transportation to your appointment needed  No    I have advised the patient to call PCP with any new or worsening symptoms  [address] you have social support  Yes, as much as I need    Are you recieving any outpatient services  No    Are you recieving home care services  No    Types of home care services  Nurse visit    Are you using any community resources  No    Current waiver services  No    Have you fallen in the last 12 months  No    Interperter language line needed  No    Counseling  Patient          Review of Systems   Respiratory: Positive for shortness of breath (RICCI). Cardiovascular: Negative. Psychiatric/Behavioral: The patient is nervous/anxious (takes lorazepam in the evening to help her calm down, needs refill today).         Current Outpatient Medications on File Prior to Visit   Medication Sig   • albuterol (2.5 mg/3 mL) 0.083 % nebulizer solution Take 3 mL (2.5 mg total) by nebulization every 6 (six) hours as needed for wheezing or shortness of breath   • albuterol (PROVENTIL HFA,VENTOLIN HFA) 90 mcg/act inhaler Inhale 2 puffs every 4 (four) hours as needed for wheezing   • apixaban (Eliquis) 5 mg Take 1 tablet (5 mg total) by mouth 2 (two) times a day   • atorvastatin (LIPITOR) 40 mg tablet Take 1 tablet (40 mg total) by mouth daily   • Benralizumab 30 MG/ML SOAJ Inject 1 mL under the skin every 28 days   • Blood Glucose Monitoring Suppl (Leydi Contour Link 2. 4) w/Device KIT Test 3 times daily   • budesonide (PULMICORT) 0.5 mg/2 mL nebulizer solution TAKE 2 ML (0.5 MG TOTAL) BY NEBULIZATION TWICE A DAY RINSE MOUTH AFTER USE   • Contour Next Test test strip USE TO TEST BLOOD SUGAR 3 TIMES A DAY   • [employer] Lancets Thin 26G MISC USE 3 (THREE) TIMES A DAY   • escitalopram (LEXAPRO) 20 mg tablet TAKE 1 TABLET BY MOUTH EVERY DAY   • ferrous sulfate 220 (44 Fe) mg/5 mL solution TAKE 5 ML (220 MG TOTAL) BY MOUTH 2 (TWO) TIMES A DAY BEFORE MEALS   • fluticasone (FLONASE) 50 mcg/act nasal spray 1 spray into each nostril 2 (two) times a day   • glycopyrrolate-formoterol (BEVESPI AEROSPHERE) 9-4.8 MCG/ACT inhaler Inhale 2 puffs 2 (two) times a day   • insulin glulisine (Apidra SoloStar) 100 units/mL injection pen 24 UNITS BREAKFAST 15 UNITS AT LUNCH AND DINNER   • Insulin Pen Needle (BD Pen Needle Love 2nd Gen) 32G X 4 MM MISC Use daily at bedtime Use 4 new needles daily . • Lantus SoloStar 100 units/mL SOPN INJECT 0.3 ML (30 UNITS TOTAL) UNDER THE SKIN DAILY AT BEDTIME   • levalbuterol (XOPENEX) 1.25 mg/0.5 mL nebulizer solution Take 0.5 mL (1.25 mg total) by nebulization 2 (two) times a day   • loratadine (CLARITIN) 10 mg tablet Take by mouth   • LORazepam (ATIVAN) 0.5 mg tablet TAKE 2 TABLETS BY MOUTH AT BEDTIME AND 1 EVERY 6 HOURS AS NEEDED FOR ANXIETY   • metFORMIN (GLUCOPHAGE-XR) 500 mg 24 hr tablet TAKE 2 TABLETS BY MOUTH TWICE A DAY WITH FOOD   • metoprolol succinate (TOPROL-XL) 100 mg 24 hr tablet Take 1 tablet (100 mg total) by mouth daily   • montelukast (SINGULAIR) 10 mg tablet TAKE 1 TABLET BY MOUTH DAILY AT BEDTIME   • naloxone (NARCAN) 4 mg/0.1 mL nasal spray Administer 1 spray into a nostril. If breathing does not return to normal or if breathing difficulty resumes after 2-3 minutes, give another dose in the other nostril using a new spray. • omeprazole (PriLOSEC) 40 MG capsule TAKE 1 CAPSULE (40 MG TOTAL) BY MOUTH DAILY.    • potassium chloride (K-DUR,KLOR-CON) 20 mEq tablet Take 2 tablets (40 mEq total) by mouth daily Do not start before August 23, 2023. • potassium chloride (K-DUR,KLOR-CON) 20 mEq tablet Take 1 tablet (20 mEq total) by mouth every evening   • semaglutide, 0.25 or 0.5 mg/dose, (Ozempic, 0.25 or 0.5 MG/DOSE,) 2 mg/3 mL injection pen Inject 0.75 mL (0.5 mg total) under the skin every 7 days   • Torsemide 40 MG TABS Take 80 mg by mouth 2 (two) times a day   • traZODone (DESYREL) 150 mg tablet TAKE 1 TABLET BY MOUTH AT BEDTIME   • EPINEPHrine (EPIPEN) 0.3 mg/0.3 mL SOAJ Inject 0.3 mL (0.3 mg total) into a muscle once for 1 dose       Objective     /72   Pulse 74   Temp 98.2 °F (36.8 °C)   Ht 5' 3" (1.6 m)   Wt 130 kg (287 lb 3.2 oz)   SpO2 92%   BMI 50.88 kg/m²       Physical Exam  Vitals reviewed. Constitutional:       Appearance: She is obese. She is ill-appearing. HENT:      Head: Normocephalic. Eyes:      General: No scleral icterus. Cardiovascular:      Rate and Rhythm: Normal rate and regular rhythm. Heart sounds: No murmur heard. Pulmonary:      Effort: Respiratory distress present. Breath sounds: Decreased breath sounds present. No wheezing, rhonchi or rales. Comments: No cough  Wearing O2 @4L via nasal cannula - sat 92%  Musculoskeletal:      Right lower leg: No edema. Left lower leg: No edema. Skin:     General: Skin is warm and dry. Neurological:      General: No focal deficit present. Mental Status: She is alert and oriented to person, place, and time.    Psychiatric:         Mood and Affect: Mood normal.         Behavior: Behavior normal.          VICKY Delgado

## 2023-09-05 NOTE — PROGRESS NOTES
Assessment:    1. Chronic hypercapnic respiratory failure ([address])        2. Asthma with COPD with exacerbation ([address])        3. Obstructive sleep apnea        4. Pulmonary hypertension (HCC)        5. Paroxysmal atrial fibrillation ([address])        6. Iron deficiency anemia due to chronic blood loss        7. Morbid obesity ([address])              Plan:   · Patient presenting for hospital follow-up, unfortunately her respiratory status has continued to worsen  · All appears likely secondary to uncontrolled CHF. Noncompliant with low-sodium diet  · Patient sent to ED, I discussed with Cardiology and charge RN  · Does not appear to be in acute COPD exacerbation   · Continue Pulmicort via nebulizer twice daily, Bevespi, Singulair, Claritin, p.r.n. Albuterol  · She remains on 4 L of oxygen at all times  · Compliant with CPAP   · Patient previously noted interested in endobronchial valve placement. Would need to lose significant amount of weight and demonstrate more reliability with medical compliance and follow-up    Subjective:     Patient ID: Ruth Bond is a 57 y.o. female. Chief Complaint:  Benny Teran is a 78-year-old female with past medical history including asthma, COPD, chronic respiratory failure on 4 L of oxygen, WILMA, morbid obesity, CHF, pulmonary hypertension presenting for hospital follow-up. Recently had prolonged hospitalization treated for COPD and CHF exacerbations. Prolonged stay was more so due to the need for IV diuretics. Unfortunately, since patient was discharged she has gained almost 10 pounds. She reports compliance with her diuretic but admits to not really monitoring her sodium intake. She feels that her breathing is getting progressively worse and is at the point where she needs to go to the emergency room for further evaluation.       The following portions of the patient's history were reviewed in this encounter and updated as appropriate:   Review of Systems   Constitutional: Positive for fatigue. Respiratory: Positive for shortness of breath. All other systems reviewed and are negative. Objective:    Physical Exam  Vitals reviewed. Constitutional:       General: She is not in acute distress. Appearance: She is obese. She is not toxic-appearing. HENT:      Head: Normocephalic and atraumatic. Eyes:      General: No scleral icterus. Cardiovascular:      Rate and Rhythm: Normal rate and regular rhythm. Pulmonary:      Comments: Diminished air entry  Pursed lip breathing  Musculoskeletal:         General: No signs of injury. Skin:     General: Skin is warm and dry. Neurological:      General: No focal deficit present. Mental Status: She is alert. Mental status is at baseline.    Psychiatric:         Mood and Affect: Mood normal.         Behavior: Behavior normal.         Lab Review:   not applicable

## 2023-09-05 NOTE — PROGRESS NOTES
Outpatient Care Management Note:    ADT alert received. Patient was sent to ER from her pulmonary appt due to worsening respiratory status.

## 2023-09-05 NOTE — ASSESSMENT & PLAN NOTE
Lactic acid 2.5, per chart review on her last admission she also noted to have lactic acidosis. Continue to trend.

## 2023-09-05 NOTE — ASSESSMENT & PLAN NOTE
Patient is on 4 L nasal cannula at home, on presentation remains on home O2 requirements. Continue O2 supplements to maintain oxygen saturation above 88%.

## 2023-09-05 NOTE — PLAN OF CARE
Problem: Potential for Falls  Goal: Patient will remain free of falls  Description: INTERVENTIONS:  - Educate patient/family on patient safety including physical limitations  - Instruct patient to call for assistance with activity   - Consult OT/PT to assist with strengthening/mobility   - Keep Call bell within reach  - Keep bed low and locked with side rails adjusted as appropriate  - Keep care items and personal belongings within reach  - Initiate and maintain comfort rounds  - Make Fall Risk Sign visible to staff  - Offer Toileting every  Hours, in advance of need  - Initiate/Maintain alarm  - Obtain necessary fall risk management equipment:   - Apply yellow socks and bracelet for high fall risk patients  - Consider moving patient to room near nurses station  Outcome: Progressing     Problem: INFECTION - ADULT  Goal: Absence or prevention of progression during hospitalization  Description: INTERVENTIONS:  - Assess and monitor for signs and symptoms of infection  - Monitor lab/diagnostic results  - Monitor all insertion sites, i.e. indwelling lines, tubes, and drains  - Monitor endotracheal if appropriate and nasal secretions for changes in amount and color  - [place] appropriate cooling/warming therapies per order  - Administer medications as ordered  - Instruct and encourage patient and family to use good hand hygiene technique  - Identify and instruct in appropriate isolation precautions for identified infection/condition  Outcome: Progressing     Problem: SAFETY ADULT  Goal: Patient will remain free of falls  Description: INTERVENTIONS:  - Educate patient/family on patient safety including physical limitations  - Instruct patient to call for assistance with activity   - Consult OT/PT to assist with strengthening/mobility   - Keep Call bell within reach  - Keep bed low and locked with side rails adjusted as appropriate  - Keep care items and personal belongings within reach  - Initiate and maintain comfort rounds  - Make Fall Risk Sign visible to staff  - Offer Toileting every  Hours, in advance of need  - Initiate/Maintain alarm  - Obtain necessary fall risk management equipmen  - Apply yellow socks and bracelet for high fall risk patients  - Consider moving patient to room near nurses station  Outcome: Progressing     Problem: RESPIRATORY - ADULT  Goal: Achieves optimal ventilation and oxygenation  Description: INTERVENTIONS:  - Assess for changes in respiratory status  - Assess for changes in mentation and behavior  - Position to facilitate oxygenation and minimize respiratory effort  - Oxygen administered by appropriate delivery if ordered  - Initiate smoking cessation education as indicated  - Encourage broncho-pulmonary hygiene including cough, deep breathe, Incentive Spirometry  - Assess the need for suctioning and aspirate as needed  - Assess and instruct to report SOB or any respiratory difficulty  - Respiratory Therapy support as indicated  Outcome: Progressing

## 2023-09-05 NOTE — ASSESSMENT & PLAN NOTE
Wt Readings from Last 3 Encounters:   09/05/23 131 kg (289 lb 1.6 oz)   09/05/23 130 kg (287 lb 3.2 oz)   08/31/23 130 kg (287 lb)     · Patient was recently discharged from hospital on 8/22, since discharge noted to have 10 pound weight gain. · Chest x-ray concerning for vascular congestion. · She received IV Bumex in ED, will start on IV Bumex. · Monitor daily weights, ins and outs.

## 2023-09-05 NOTE — ASSESSMENT & PLAN NOTE
She remains SOB w/RICCI s/p hospital admission  She is compliant w/using 4L O2 via nasal cannula  Pulmonary appt scheduled this afternoon - f/u as planned

## 2023-09-05 NOTE — ASSESSMENT & PLAN NOTE
· History noted, patient is on pembrolizumab, montelukast, Xopenex, Pulmicort and albuterol as needed at home. · Was evaluated by pulmonary in outpatient setting today, doubt COPD exacerbation. · Patient received Solu-Medrol in ED, started on IV steroids. · Low threshold to DC steroids.

## 2023-09-06 LAB
ALBUMIN SERPL BCP-MCNC: 4.1 G/DL (ref 3.5–5)
ALP SERPL-CCNC: 140 U/L (ref 34–104)
ALT SERPL W P-5'-P-CCNC: 26 U/L (ref 7–52)
ANION GAP SERPL CALCULATED.3IONS-SCNC: 6 MMOL/L
ANISOCYTOSIS BLD QL SMEAR: PRESENT
AST SERPL W P-5'-P-CCNC: 18 U/L (ref 13–39)
BASOPHILS # BLD AUTO: 0.02 THOUSANDS/ÂΜL (ref 0–0.1)
BASOPHILS NFR BLD AUTO: 0 % (ref 0–1)
BILIRUB SERPL-MCNC: 0.39 MG/DL (ref 0.2–1)
BUN SERPL-MCNC: 15 MG/DL (ref 5–25)
CALCIUM SERPL-MCNC: 8.9 MG/DL (ref 8.4–10.2)
CHLORIDE SERPL-SCNC: 93 MMOL/L (ref 96–108)
CO2 SERPL-SCNC: 35 MMOL/L (ref 21–32)
CREAT SERPL-MCNC: 0.53 MG/DL (ref 0.6–1.3)
EOSINOPHIL # BLD AUTO: 0 THOUSAND/ÂΜL (ref 0–0.61)
EOSINOPHIL NFR BLD AUTO: 0 % (ref 0–6)
ERYTHROCYTE [DISTWIDTH] IN BLOOD BY AUTOMATED COUNT: 23.9 % (ref 11.6–15.1)
GFR SERPL CREATININE-BSD FRML MDRD: 100 ML/MIN/1.73SQ M
GLUCOSE SERPL-MCNC: 324 MG/DL (ref 65–140)
GLUCOSE SERPL-MCNC: 340 MG/DL (ref 65–140)
GLUCOSE SERPL-MCNC: 355 MG/DL (ref 65–140)
GLUCOSE SERPL-MCNC: 389 MG/DL (ref 65–140)
GLUCOSE SERPL-MCNC: 390 MG/DL (ref 65–140)
GLUCOSE SERPL-MCNC: 439 MG/DL (ref 65–140)
HCT VFR BLD AUTO: 34.8 % (ref 34.8–46.1)
HGB BLD-MCNC: 10 G/DL (ref 11.5–15.4)
IMM GRANULOCYTES # BLD AUTO: 0.1 THOUSAND/UL (ref 0–0.2)
IMM GRANULOCYTES NFR BLD AUTO: 1 % (ref 0–2)
LACTATE SERPL-SCNC: 2.3 MMOL/L (ref 0.5–2)
LYMPHOCYTES # BLD AUTO: 0.74 THOUSANDS/ÂΜL (ref 0.6–4.47)
LYMPHOCYTES NFR BLD AUTO: 7 % (ref 14–44)
MAGNESIUM SERPL-MCNC: 2.3 MG/DL (ref 1.9–2.7)
MCH RBC QN AUTO: 20.8 PG (ref 26.8–34.3)
MCHC RBC AUTO-ENTMCNC: 28.7 G/DL (ref 31.4–37.4)
MCV RBC AUTO: 73 FL (ref 82–98)
MONOCYTES # BLD AUTO: 0.2 THOUSAND/ÂΜL (ref 0.17–1.22)
MONOCYTES NFR BLD AUTO: 2 % (ref 4–12)
NEUTROPHILS # BLD AUTO: 10.38 THOUSANDS/ÂΜL (ref 1.85–7.62)
NEUTS SEG NFR BLD AUTO: 90 % (ref 43–75)
NRBC BLD AUTO-RTO: 0 /100 WBCS
OVALOCYTES BLD QL SMEAR: PRESENT
PLATELET # BLD AUTO: 290 THOUSANDS/UL (ref 149–390)
PLATELET BLD QL SMEAR: ADEQUATE
PMV BLD AUTO: 11.6 FL (ref 8.9–12.7)
POTASSIUM SERPL-SCNC: 4.2 MMOL/L (ref 3.5–5.3)
PROT SERPL-MCNC: 6.6 G/DL (ref 6.4–8.4)
RBC # BLD AUTO: 4.8 MILLION/UL (ref 3.81–5.12)
RBC MORPH BLD: PRESENT
SODIUM SERPL-SCNC: 134 MMOL/L (ref 135–147)
WBC # BLD AUTO: 11.44 THOUSAND/UL (ref 4.31–10.16)

## 2023-09-06 PROCEDURE — 83735 ASSAY OF MAGNESIUM: CPT | Performed by: STUDENT IN AN ORGANIZED HEALTH CARE EDUCATION/TRAINING PROGRAM

## 2023-09-06 PROCEDURE — 85025 COMPLETE CBC W/AUTO DIFF WBC: CPT | Performed by: STUDENT IN AN ORGANIZED HEALTH CARE EDUCATION/TRAINING PROGRAM

## 2023-09-06 PROCEDURE — 94640 AIRWAY INHALATION TREATMENT: CPT

## 2023-09-06 PROCEDURE — 82948 REAGENT STRIP/BLOOD GLUCOSE: CPT

## 2023-09-06 PROCEDURE — 83605 ASSAY OF LACTIC ACID: CPT | Performed by: STUDENT IN AN ORGANIZED HEALTH CARE EDUCATION/TRAINING PROGRAM

## 2023-09-06 PROCEDURE — 94760 N-INVAS EAR/PLS OXIMETRY 1: CPT

## 2023-09-06 PROCEDURE — 80053 COMPREHEN METABOLIC PANEL: CPT | Performed by: STUDENT IN AN ORGANIZED HEALTH CARE EDUCATION/TRAINING PROGRAM

## 2023-09-06 PROCEDURE — 99232 SBSQ HOSP IP/OBS MODERATE 35: CPT | Performed by: STUDENT IN AN ORGANIZED HEALTH CARE EDUCATION/TRAINING PROGRAM

## 2023-09-06 PROCEDURE — 99223 1ST HOSP IP/OBS HIGH 75: CPT | Performed by: INTERNAL MEDICINE

## 2023-09-06 PROCEDURE — 94660 CPAP INITIATION&MGMT: CPT

## 2023-09-06 RX ORDER — INSULIN LISPRO 100 [IU]/ML
20 INJECTION, SOLUTION INTRAVENOUS; SUBCUTANEOUS
Status: DISCONTINUED | OUTPATIENT
Start: 2023-09-06 | End: 2023-09-07

## 2023-09-06 RX ORDER — BUMETANIDE 0.25 MG/ML
2 INJECTION INTRAMUSCULAR; INTRAVENOUS ONCE
Status: COMPLETED | OUTPATIENT
Start: 2023-09-06 | End: 2023-09-06

## 2023-09-06 RX ORDER — METHYLPREDNISOLONE SODIUM SUCCINATE 40 MG/ML
40 INJECTION, POWDER, LYOPHILIZED, FOR SOLUTION INTRAMUSCULAR; INTRAVENOUS DAILY
Status: COMPLETED | OUTPATIENT
Start: 2023-09-07 | End: 2023-09-07

## 2023-09-06 RX ORDER — BUMETANIDE 0.25 MG/ML
4 INJECTION INTRAMUSCULAR; INTRAVENOUS 2 TIMES DAILY
Status: DISCONTINUED | OUTPATIENT
Start: 2023-09-06 | End: 2023-09-08

## 2023-09-06 RX ORDER — INSULIN GLARGINE 100 [IU]/ML
40 INJECTION, SOLUTION SUBCUTANEOUS
Status: DISCONTINUED | OUTPATIENT
Start: 2023-09-06 | End: 2023-09-08 | Stop reason: HOSPADM

## 2023-09-06 RX ORDER — BUMETANIDE 0.25 MG/ML
4 INJECTION INTRAMUSCULAR; INTRAVENOUS 2 TIMES DAILY
Status: DISCONTINUED | OUTPATIENT
Start: 2023-09-06 | End: 2023-09-06

## 2023-09-06 RX ADMIN — INSULIN LISPRO 10 UNITS: 100 INJECTION, SOLUTION INTRAVENOUS; SUBCUTANEOUS at 11:52

## 2023-09-06 RX ADMIN — BUMETANIDE 2 MG: 0.25 INJECTION, SOLUTION INTRAMUSCULAR; INTRAVENOUS at 10:37

## 2023-09-06 RX ADMIN — BUMETANIDE 4 MG: 0.25 INJECTION, SOLUTION INTRAMUSCULAR; INTRAVENOUS at 17:13

## 2023-09-06 RX ADMIN — LEVALBUTEROL HYDROCHLORIDE 1.25 MG: 1.25 SOLUTION RESPIRATORY (INHALATION) at 19:37

## 2023-09-06 RX ADMIN — INSULIN LISPRO 12 UNITS: 100 INJECTION, SOLUTION INTRAVENOUS; SUBCUTANEOUS at 21:13

## 2023-09-06 RX ADMIN — INSULIN LISPRO 20 UNITS: 100 INJECTION, SOLUTION INTRAVENOUS; SUBCUTANEOUS at 17:17

## 2023-09-06 RX ADMIN — Medication 150 MG: at 08:02

## 2023-09-06 RX ADMIN — INSULIN LISPRO 10 UNITS: 100 INJECTION, SOLUTION INTRAVENOUS; SUBCUTANEOUS at 07:52

## 2023-09-06 RX ADMIN — BUMETANIDE 2 MG: 0.25 INJECTION, SOLUTION INTRAMUSCULAR; INTRAVENOUS at 08:07

## 2023-09-06 RX ADMIN — PANTOPRAZOLE SODIUM 40 MG: 40 TABLET, DELAYED RELEASE ORAL at 05:00

## 2023-09-06 RX ADMIN — MONTELUKAST 10 MG: 10 TABLET, FILM COATED ORAL at 21:13

## 2023-09-06 RX ADMIN — FLUTICASONE PROPIONATE 1 SPRAY: 50 SPRAY, METERED NASAL at 08:01

## 2023-09-06 RX ADMIN — POTASSIUM CHLORIDE 40 MEQ: 1500 TABLET, EXTENDED RELEASE ORAL at 08:02

## 2023-09-06 RX ADMIN — INSULIN GLARGINE 40 UNITS: 100 INJECTION, SOLUTION SUBCUTANEOUS at 21:13

## 2023-09-06 RX ADMIN — INSULIN LISPRO 20 UNITS: 100 INJECTION, SOLUTION INTRAVENOUS; SUBCUTANEOUS at 11:52

## 2023-09-06 RX ADMIN — ATORVASTATIN CALCIUM 40 MG: 40 TABLET, FILM COATED ORAL at 08:01

## 2023-09-06 RX ADMIN — INSULIN LISPRO 15 UNITS: 100 INJECTION, SOLUTION INTRAVENOUS; SUBCUTANEOUS at 07:55

## 2023-09-06 RX ADMIN — IPRATROPIUM BROMIDE 0.5 MG: 0.5 SOLUTION RESPIRATORY (INHALATION) at 19:37

## 2023-09-06 RX ADMIN — METOPROLOL SUCCINATE 100 MG: 50 TABLET, EXTENDED RELEASE ORAL at 08:01

## 2023-09-06 RX ADMIN — APIXABAN 5 MG: 5 TABLET, FILM COATED ORAL at 08:01

## 2023-09-06 RX ADMIN — LORAZEPAM 0.5 MG: 0.5 TABLET ORAL at 21:13

## 2023-09-06 RX ADMIN — INSULIN LISPRO 10 UNITS: 100 INJECTION, SOLUTION INTRAVENOUS; SUBCUTANEOUS at 00:00

## 2023-09-06 RX ADMIN — METHYLPREDNISOLONE SODIUM SUCCINATE 40 MG: 40 INJECTION, POWDER, FOR SOLUTION INTRAMUSCULAR; INTRAVENOUS at 08:16

## 2023-09-06 RX ADMIN — IPRATROPIUM BROMIDE 0.5 MG: 0.5 SOLUTION RESPIRATORY (INHALATION) at 07:21

## 2023-09-06 RX ADMIN — INSULIN LISPRO 10 UNITS: 100 INJECTION, SOLUTION INTRAVENOUS; SUBCUTANEOUS at 17:16

## 2023-09-06 RX ADMIN — FLUTICASONE PROPIONATE 1 SPRAY: 50 SPRAY, METERED NASAL at 21:13

## 2023-09-06 RX ADMIN — APIXABAN 5 MG: 5 TABLET, FILM COATED ORAL at 17:10

## 2023-09-06 RX ADMIN — LEVALBUTEROL HYDROCHLORIDE 1.25 MG: 1.25 SOLUTION RESPIRATORY (INHALATION) at 07:21

## 2023-09-06 RX ADMIN — ACETAMINOPHEN 650 MG: 325 TABLET, FILM COATED ORAL at 21:13

## 2023-09-06 RX ADMIN — ESCITALOPRAM OXALATE 20 MG: 20 TABLET ORAL at 08:02

## 2023-09-06 RX ADMIN — TRAZODONE HYDROCHLORIDE 150 MG: 150 TABLET ORAL at 21:13

## 2023-09-06 NOTE — PROGRESS NOTES
[facility]  Progress Note  Name: Manish Franks  MRN: [id number]  Unit/Bed#: -49 I Date of Admission: 9/5/2023   Date of Service: 9/6/2023 I Hospital Day: 1    Assessment/Plan   * Acute on chronic diastolic congestive heart failure (HCC)  Assessment & Plan  Wt Readings from Last 3 Encounters:   09/06/23 130 kg (287 lb 9.6 oz)   09/05/23 130 kg (287 lb 3.2 oz)   08/31/23 130 kg (287 lb)     · Patient was recently discharged from hospital on 8/22, since discharge noted to have 10 pound weight gain. · Chest x-ray concerning for vascular congestion. · She received IV Bumex in ED, will start on IV Bumex. · Monitor daily weights, ins and outs. · Despite patient being compliant on oral torsemide per cardiology recommendation since his last discharge patient presented with acute on chronic CHF, will consult cardiology for discharge regimen. COPD, severe ([address])  Assessment & Plan  · History noted, patient is on pembrolizumab, montelukast, Xopenex, Pulmicort and albuterol as needed at home. · Was evaluated by pulmonary in outpatient setting today, doubt COPD exacerbation. · Patient received Solu-Medrol in ED,  · Given no wheezing on exam, will titrate down Solu-Medrol to daily today and discontinue tomorrow. · Low threshold to DC steroids. · Continue Xopenex and Atrovent. Morbid obesity ([address])  Assessment & Plan  Counseled on lifestyle modification including regular exercise and diet changes. Chronic hypercapnic respiratory failure [facility])  Assessment & Plan  Patient is on 4 L nasal cannula at home, on presentation remains on home O2 requirements. Continue O2 supplements to maintain oxygen saturation above 88%. Lactic acidosis  Assessment & Plan  · Lactic acid 2.5, per chart review on her last admission she also noted to have lactic acidosis.   · Repeat LA trended up to 4.6, doubt infection versus D lactic acidosis  · Given patient is volume overloaded cannot give any IV fluids. Paroxysmal atrial fibrillation (HCC)  Assessment & Plan  · Rate controlled with metoprolol on Eliquis for anticoagulation. · Continue same. Type 2 diabetes mellitus with stage 2 chronic kidney disease, with long-term current use of insulin [facility])  Assessment & Plan  Lab Results   Component Value Date    HGBA1C 9.9 (H) 05/11/2023       Recent Labs     09/05/23  1818 09/05/23  2051 09/05/23  2351 09/06/23  0718   POCGLU 288* 433* 443* 355*       Blood Sugar Average: Last 72 hrs:  (P) 379.75   Outpatient regimen: Patient is on Lantus 30 units, Humalog 24 units with breakfast and 15 units with lunch and dinner, metformin and Ozempic. Inpatient regimen: Will increase Lantus to 40 units, increase Premeal to 20 units, continue with sliding scale insulin. Started on hypoglycemia protocol. Continue Carb counting diet. Hypercholesterolemia  Assessment & Plan  Patient is on atorvastatin, continue same. Obstructive sleep apnea  Assessment & Plan  Uses CPAP at nighttime, continue same. Esophageal reflux  Assessment & Plan  Continue pantoprazole at home dosage. VTE Pharmacologic Prophylaxis: VTE Score: 7 Moderate Risk (Score 3-4) - Pharmacological DVT Prophylaxis Ordered: apixaban (Eliquis). Patient Centered Rounds: I performed bedside rounds with nursing staff today. Discussions with Specialists or Other Care Team Provider: POLO    Education and Discussions with Family / Patient: Patient declined call to . Total Time Spent on Date of Encounter in care of patient: 35 minutes This time was spent on one or more of the following: performing physical exam; counseling and coordination of care; obtaining or reviewing history; documenting in the medical record; reviewing/ordering tests, medications or procedures; communicating with other healthcare professionals and discussing with patient's family/caregivers.     Current Length of Stay: 1 day(s)  Current Patient Status: Inpatient   Certification Statement: The patient will continue to require additional inpatient hospital stay due to Continues to be on IV Bumex. Discharge Plan: Anticipate discharge in 24-48 hrs to home. Code Status: Level 1 - Full Code    Subjective:   Patient examined at bedside, reports feeling slightly better since presentation. She has been noted to be noncompliant with her diet, again discussed and emphasized importance of compliance not only with medication but lifestyle modification as well. Strictly encouraged for fluid restriction. Objective:     Vitals:   Temp (24hrs), Av.7 °F (36.5 °C), Min:96.9 °F (36.1 °C), Max:98.5 °F (36.9 °C)    Temp:  [96.9 °F (36.1 °C)-98.5 °F (36.9 °C)] 97.4 °F (36.3 °C)  HR:  [74-88] 78  Resp:  [18-22] 18  BP: (115-136)/(57-72) 122/60  SpO2:  [87 %-100 %] 90 %  Body mass index is 50.95 kg/m². Input and Output Summary (last 24 hours): Intake/Output Summary (Last 24 hours) at 2023 0847  Last data filed at 2023 0843  Gross per 24 hour   Intake 2254 ml   Output 2200 ml   Net 54 ml       Physical Exam:   Physical Exam  Constitutional:       Appearance: She is obese. HENT:      Head: Normocephalic and atraumatic. Eyes:      Extraocular Movements: Extraocular movements intact. Conjunctiva/sclera: Conjunctivae normal.   Cardiovascular:      Rate and Rhythm: Normal rate and regular rhythm. Pulses: Normal pulses. Heart sounds: Normal heart sounds. Pulmonary:      Effort: Pulmonary effort is normal.      Comments: Decreased breath sounds bilaterally, no wheezes or rhonchi. Abdominal:      General: Abdomen is flat. Bowel sounds are normal.   Musculoskeletal:      Cervical back: Normal range of motion. Right lower leg: Edema present. Left lower leg: Edema present. Skin:     General: Skin is warm and dry. Neurological:      General: No focal deficit present.       Mental Status: She is alert and oriented to person, place, and time.          Additional Data:     Labs:  Results from last 7 days   Lab Units 09/06/23  0504   WBC Thousand/uL 11.44*   HEMOGLOBIN g/dL 10.0*   HEMATOCRIT % 34.8   PLATELETS Thousands/uL 290   NEUTROS PCT % 90*   LYMPHS PCT % 7*   MONOS PCT % 2*   EOS PCT % 0     Results from last 7 days   Lab Units 09/06/23  0504   SODIUM mmol/L 134*   POTASSIUM mmol/L 4.2   CHLORIDE mmol/L 93*   CO2 mmol/L 35*   BUN mg/dL 15   CREATININE mg/dL 0.53*   ANION GAP mmol/L 6   CALCIUM mg/dL 8.9   ALBUMIN g/dL 4.1   TOTAL BILIRUBIN mg/dL 0.39   ALK PHOS U/L 140*   ALT U/L 26   AST U/L 18   GLUCOSE RANDOM mg/dL 340*     Results from last 7 days   Lab Units 09/05/23  1514   INR  1.07     Results from last 7 days   Lab Units 09/06/23  0718 09/05/23  2351 09/05/23  2051 09/05/23  1818   POC GLUCOSE mg/dl 355* 443* 433* 288*         Results from last 7 days   Lab Units 09/05/23  1853 09/05/23  1514   LACTIC ACID mmol/L 4.6* 2.5*   PROCALCITONIN ng/ml  --  <0.05       Lines/Drains:  Invasive Devices     Peripheral Intravenous Line  Duration           Peripheral IV 09/05/23 Left;Proximal;Ventral (anterior) Forearm <1 day                  Telemetry:  Telemetry Orders (From admission, onward)             24 Hour Telemetry Monitoring  (ED Bridging Orders Panel)  Continuous x 24 Hours ([place])        Question:  Reason for 24 Hour Telemetry  Answer:  Decompensated CHF- and any one of the following: continuous diuretic infusion or total diuretic dose >200 mg daily, associated electrolyte derangement (I.e. K < 3.0), ionotropic drip (continuous infusion), hx of ventricular arrhythmia, or new EF < 35%                 Telemetry Reviewed: Normal Sinus Rhythm  Indication for Continued Telemetry Use: No indication for continued use. Will discontinue. Imaging: No pertinent imaging reviewed. Recent Cultures (last 7 days):   Results from last 7 days   Lab Units 09/05/23  1514   BLOOD CULTURE  Received in Microbiology Lab. Culture in Progress. Received in Microbiology Lab. Culture in Progress. Last 24 Hours Medication List:   Current Facility-Administered Medications   Medication Dose Route Frequency Provider Last Rate   • acetaminophen  650 mg Oral Q6H PRN Melissa Abdullahi PA-C     • aluminum-magnesium hydroxide-simethicone  30 mL Oral Q6H PRN Quinn Hernández MD     • apixaban  5 mg Oral BID Quinn Hernández MD     • atorvastatin  40 mg Oral Daily Quinn Hernández MD     • bumetanide  2 mg Intravenous Daily Quinn Hernández MD     • escitalopram  20 mg Oral Daily Quinn Hernández MD     • ferrous sulfate  150 mg Oral Daily Quinn Hernández MD     • fluticasone  1 spray Nasal BID Quinn Hernández MD     • insulin glargine  40 Units Subcutaneous HS Quinn Hernández MD     • insulin lispro  2-12 Units Subcutaneous TID AC Quinn Hernández MD     • insulin lispro  2-12 Units Subcutaneous HS Rosamaria Miller PA-C     • insulin lispro  20 Units Subcutaneous TID With Meals Quinn Hernández MD     • ipratropium  0.5 mg Nebulization TID Quinn Hernández MD     • levalbuterol  1.25 mg Nebulization BID Quinn Hernández MD     • LORazepam  0.5 mg Oral HS PRN Melissa Abdullahi PA-C     • [START ON 9/7/2023] methylPREDNISolone sodium succinate  40 mg Intravenous Daily Quinn Hernández MD     • metoprolol succinate  100 mg Oral Daily Quinn Hernández MD     • montelukast  10 mg Oral HS Quinn Hernández MD     • ondansetron  4 mg Intravenous Q6H PRN Quinn Hernández MD     • pantoprazole  40 mg Oral Early Morning Quinn Hernández MD     • potassium chloride  40 mEq Oral Daily Quinn Hernández MD     • senna  1 tablet Oral HS PRN Quinn Hernández MD     • traZODone  150 mg Oral HS Quinn Hernández MD          Today, Patient Was Seen By: Quinn Hernández MD    **Please Note: This note may have been constructed using a voice recognition system. **

## 2023-09-06 NOTE — PLAN OF CARE
Problem: Potential for Falls  Goal: Patient will remain free of falls  Description: INTERVENTIONS:  - Educate patient/family on patient safety including physical limitations  - Instruct patient to call for assistance with activity   - Consult OT/PT to assist with strengthening/mobility   - Keep Call bell within reach  - Keep bed low and locked with side rails adjusted as appropriate  - Keep care items and personal belongings within reach  - Initiate and maintain comfort rounds  - Make Fall Risk Sign visible to staff  - Offer Toileting every 2 Hours, in advance of need  - Initiate/Maintain bed alarm  - Obtain necessary fall risk management equipment: x  - Apply yellow socks and bracelet for high fall risk patients  - Consider moving patient to room near nurses station  Outcome: Progressing     Problem: INFECTION - ADULT  Goal: Absence or prevention of progression during hospitalization  Description: INTERVENTIONS:  - Assess and monitor for signs and symptoms of infection  - Monitor lab/diagnostic results  - Monitor all insertion sites, i.e. indwelling lines, tubes, and drains  - Monitor endotracheal if appropriate and nasal secretions for changes in amount and color  - [place] appropriate cooling/warming therapies per order  - Administer medications as ordered  - Instruct and encourage patient and family to use good hand hygiene technique  - Identify and instruct in appropriate isolation precautions for identified infection/condition  Outcome: Progressing     Problem: SAFETY ADULT  Goal: Patient will remain free of falls  Description: INTERVENTIONS:  - Educate patient/family on patient safety including physical limitations  - Instruct patient to call for assistance with activity   - Consult OT/PT to assist with strengthening/mobility   - Keep Call bell within reach  - Keep bed low and locked with side rails adjusted as appropriate  - Keep care items and personal belongings within reach  - Initiate and maintain comfort rounds  - Make Fall Risk Sign visible to staff  - Offer Toileting every 2 Hours, in advance of need  - Initiate/Maintain bed alarm  - Obtain necessary fall risk management equipment: x  - Apply yellow socks and bracelet for high fall risk patients  - Consider moving patient to room near nurses station  Outcome: Progressing     Problem: RESPIRATORY - ADULT  Goal: Achieves optimal ventilation and oxygenation  Description: INTERVENTIONS:  - Assess for changes in respiratory status  - Assess for changes in mentation and behavior  - Position to facilitate oxygenation and minimize respiratory effort  - Oxygen administered by appropriate delivery if ordered  - Initiate smoking cessation education as indicated  - Encourage broncho-pulmonary hygiene including cough, deep breathe, Incentive Spirometry  - Assess the need for suctioning and aspirate as needed  - Assess and instruct to report SOB or any respiratory difficulty  - Respiratory Therapy support as indicated  Outcome: Progressing     Problem: MOBILITY - ADULT  Goal: Maintain or return to baseline ADL function  Description: INTERVENTIONS:  -  Assess patient's ability to carry out ADLs; assess patient's baseline for ADL function and identify physical deficits which impact ability to perform ADLs (bathing, care of mouth/teeth, toileting, grooming, dressing, etc.)  - Assess/evaluate cause of self-care deficits   - Assess range of motion  - Assess patient's mobility; develop plan if impaired  - Assess patient's need for assistive devices and provide as appropriate  - Encourage maximum independence but intervene and supervise when necessary  - Involve family in performance of ADLs  - Assess for home care needs following discharge   - Consider OT consult to assist with ADL evaluation and planning for discharge  - Provide patient education as appropriate  Outcome: Progressing  Goal: Maintains/Returns to pre admission functional level  Description: INTERVENTIONS:  - Perform BMAT or MOVE assessment daily.   - Set and communicate daily mobility goal to care team and patient/family/caregiver. - Collaborate with rehabilitation services on mobility goals if consulted  - Perform Range of Motion 2 times a day. - Reposition patient every 2 hours.   - Dangle patient 2 times a day  - Stand patient 3 times a day  - Ambulate patient 3 times a day  - Out of bed to chair 3 times a day   - Out of bed for meals 3 times a day  - Out of bed for toileting  - Record patient progress and toleration of activity level   Outcome: Progressing     Problem: Prexisting or High Potential for Compromised Skin Integrity  Goal: Skin integrity is maintained or improved  Description: INTERVENTIONS:  - Identify patients at risk for skin breakdown  - Assess and monitor skin integrity  - Assess and monitor nutrition and hydration status  - Monitor labs   - Assess for incontinence   - Turn and reposition patient  - Assist with mobility/ambulation  - Relieve pressure over bony prominences  - Avoid friction and shearing  - Provide appropriate hygiene as needed including keeping skin clean and dry  - Evaluate need for skin moisturizer/barrier cream  - Collaborate with interdisciplinary team   - Patient/family teaching  - Consider wound care consult   Outcome: Progressing     Problem: PAIN - ADULT  Goal: Verbalizes/displays adequate comfort level or baseline comfort level  Description: Interventions:  - Encourage patient to monitor pain and request assistance  - Assess pain using appropriate pain scale  - Administer analgesics based on type and severity of pain and evaluate response  - Implement non-pharmacological measures as appropriate and evaluate response  - Consider cultural and social influences on pain and pain management  - Notify physician/advanced practitioner if interventions unsuccessful or patient reports new pain  Outcome: Progressing     Problem: DISCHARGE PLANNING  Goal: Discharge to home or other facility with appropriate resources  Description: INTERVENTIONS:  - Identify barriers to discharge w/patient and caregiver  - Arrange for needed discharge resources and transportation as appropriate  - Identify discharge learning needs (meds, wound care, etc.)  - Arrange for interpretive services to assist at discharge as needed  - Refer to Case Management Department for coordinating discharge planning if the patient needs post-hospital services based on physician/advanced practitioner order or complex needs related to functional status, cognitive ability, or social support system  Outcome: Progressing     Problem: Knowledge Deficit  Goal: Patient/family/caregiver demonstrates understanding of disease process, treatment plan, medications, and discharge instructions  Description: Complete learning assessment and assess knowledge base. Interventions:  - Provide teaching at level of understanding  - Provide teaching via preferred learning methods  Outcome: Progressing     Problem: Nutrition/Hydration-ADULT  Goal: Nutrient/Hydration intake appropriate for improving, restoring or maintaining nutritional needs  Description: Monitor and assess patient's nutrition/hydration status for malnutrition. Collaborate with interdisciplinary team and initiate plan and interventions as ordered. Monitor patient's weight and dietary intake as ordered or per policy. Utilize nutrition screening tool and intervene as necessary. Determine patient's food preferences and provide high-protein, high-caloric foods as appropriate.      INTERVENTIONS:  - Monitor oral intake, urinary output, labs, and treatment plans  - Assess nutrition and hydration status and recommend course of action  - Evaluate amount of meals eaten  - Assist patient with eating if necessary   - Allow adequate time for meals  - Recommend/ encourage appropriate diets, oral nutritional supplements, and vitamin/mineral supplements  - Order, calculate, and assess calorie counts as needed  - Recommend, monitor, and adjust tube feedings and TPN/PPN based on assessed needs  - Assess need for intravenous fluids  - Provide specific nutrition/hydration education as appropriate  - Include patient/family/caregiver in decisions related to nutrition  Outcome: Progressing     Problem: CARDIOVASCULAR - ADULT  Goal: Maintains optimal cardiac output and hemodynamic stability  Description: INTERVENTIONS:  - Monitor I/O, vital signs and rhythm  - Monitor for S/S and trends of decreased cardiac output  - Administer and titrate ordered vasoactive medications to optimize hemodynamic stability  - Assess quality of pulses, skin color and temperature  - Assess for signs of decreased coronary artery perfusion  - Instruct patient to report change in severity of symptoms  Outcome: Progressing  Goal: Absence of cardiac dysrhythmias or at baseline rhythm  Description: INTERVENTIONS:  - Continuous cardiac monitoring, vital signs, obtain 12 lead EKG if ordered  - Administer antiarrhythmic and heart rate control medications as ordered  - Monitor electrolytes and administer replacement therapy as ordered  Outcome: Progressing     Problem: METABOLIC, FLUID AND ELECTROLYTES - ADULT  Goal: Electrolytes maintained within normal limits  Description: INTERVENTIONS:  - Monitor labs and assess patient for signs and symptoms of electrolyte imbalances  - Administer electrolyte replacement as ordered  - Monitor response to electrolyte replacements, including repeat lab results as appropriate  - Instruct patient on fluid and nutrition as appropriate  Outcome: Progressing  Goal: Fluid balance maintained  Description: INTERVENTIONS:  - Monitor labs   - Monitor I/O and WT  - Instruct patient on fluid and nutrition as appropriate  - Assess for signs & symptoms of volume excess or deficit  Outcome: Progressing  Goal: Glucose maintained within target range  Description: INTERVENTIONS:  - Monitor Blood Glucose as ordered  - Assess for signs and symptoms of hyperglycemia and hypoglycemia  - Administer ordered medications to maintain glucose within target range  - Assess nutritional intake and initiate nutrition service referral as needed  Outcome: Progressing     Problem: SKIN/TISSUE INTEGRITY - ADULT  Goal: Skin Integrity remains intact(Skin Breakdown Prevention)  Description: Assess:  -Perform Jt assessment every shift  -Clean and moisturize skin every as needed  -Inspect skin when repositioning, toileting, and assisting with ADLS  -Assess under medical devices such as x every x  -Assess extremities for adequate circulation and sensation     Bed Management:  -Have minimal linens on bed & keep smooth, unwrinkled  -Change linens as needed when moist or perspiring  -Avoid sitting or lying in one position for more than 4 hours while in bed  -Keep HOB at 30degrees     Toileting:  -Offer bedside commode  -Assess for incontinence every x  -Use incontinent care products after each incontinent episode such as x    Activity:  -Mobilize patient 4 times a day  -Encourage activity and walks on unit  -Encourage or provide ROM exercises   -Turn and reposition patient every 2 Hours  -Use appropriate equipment to lift or move patient in bed  -Instruct/ Assist with weight shifting every 2 when out of bed in chair  -Consider limitation of chair time 4 hour intervals    Skin Care:  -Avoid use of baby powder, tape, friction and shearing, hot water or constrictive clothing  -Relieve pressure over bony prominences using x  -Do not massage red bony areas    Next Steps:  -Teach patient strategies to minimize risks such as x   -Consider consults to  interdisciplinary teams such as x  Outcome: Progressing     Problem: HEMATOLOGIC - ADULT  Goal: Maintains hematologic stability  Description: INTERVENTIONS  - Assess for signs and symptoms of bleeding or hemorrhage  - Monitor labs  - Administer supportive blood products/factors as ordered and appropriate  Outcome: Progressing     Problem: MUSCULOSKELETAL - ADULT  Goal: Maintain or return mobility to safest level of function  Description: INTERVENTIONS:  - Assess patient's ability to carry out ADLs; assess patient's baseline for ADL function and identify physical deficits which impact ability to perform ADLs (bathing, care of mouth/teeth, toileting, grooming, dressing, etc.)  - Assess/evaluate cause of self-care deficits   - Assess range of motion  - Assess patient's mobility  - Assess patient's need for assistive devices and provide as appropriate  - Encourage maximum independence but intervene and supervise when necessary  - Involve family in performance of ADLs  - Assess for home care needs following discharge   - Consider OT consult to assist with ADL evaluation and planning for discharge  - Provide patient education as appropriate  Outcome: Progressing

## 2023-09-06 NOTE — ASSESSMENT & PLAN NOTE
· Lactic acid 2.5, per chart review on her last admission she also noted to have lactic acidosis. · Repeat LA trended up to 4.6, doubt infection versus D lactic acidosis  · Given patient is volume overloaded cannot give any IV fluids.

## 2023-09-06 NOTE — ASSESSMENT & PLAN NOTE
· History noted, patient is on pembrolizumab, montelukast, Xopenex, Pulmicort and albuterol as needed at home. · Was evaluated by pulmonary in outpatient setting today, doubt COPD exacerbation. · Patient received Solu-Medrol in ED,  · Given no wheezing on exam, will titrate down Solu-Medrol to daily today and discontinue tomorrow. · Low threshold to DC steroids. · Continue Xopenex and Atrovent.

## 2023-09-06 NOTE — CONSULTS
Consult - Cardiology   Daivd Ángel 57 y.o. female MRN: [id number]  Unit/Bed#: -01 Encounter: [id number]    Reason For Consult: Acute on chronic CHF  Outpatient Cardiologist: Dr Calista Tyler (Last OV 8/31/2023 by Kathi Chaves PA-C). ASSESSMENT:  1. Chronic respiratory failure  a. Multifactorial  b. Baseline oxygen requirement 4L --stable at baseline currently   c. Follows with pulmonology as outpatient  2. Acute on chronic HFpEF, EF 65%  a. +11lb weight gain since prior admission, c/o increased shortness of breath beyond her baseline  b. 8/2023 Echo: LVEF 42%, grade 2 diastolic dysfunction, dilated RV with normal RV systolic function, dilated RA, severely elevated RV systolic pressure 61 mmHg  c. 9/2021 RHC: Severely elevated left and right-sided filling pressures, severe postcapillary pulmonary hypertension group 2, normal cardiac output  d. Wt previously down to 278 on admission August 2023  e. Current diuretic Torsemide 80 BID with potassium 40 AM/20 PM  3. Paroxysmal atrial fibrillation  a. Eliquis 5 twice daily for CVA risk reduction  b. AV blocking Rx: Toprol- daily  4. Type 2 diabetes  5. COPD  6. Obstructive sleep apnea    PLAN/ DISCUSSION:     • Strongly suspect dietary non-compliance as contributing factor for +11lb wt gain as outpatient. She was taking torsemide 80mg BID prior to her hospitalization which she reports complaince with. Will increase to equivocal dose of IV Bumex (4mg BID) and reassess for 24 hours. Consider the addition of metolazone 5mg QD tomorrow based on tomorrows labwork. Volume status is very difficult to determine given her habitus. • Strict adherence to salt/fluid restrictions were discussed in detail. Cardiac diet ordered with 1500mL fluid restriction and 2GM sodium allowance daily. She may benefit from nutritional counseling. Follow BMP replace for goal K > 4 and Mg > 2. Follow UOP overnight. Daily standing weights. • Additional cardiac meds as above. History of Present Illness:  Alyssa Nunez is a 57y.o. year old female with PMH as above who presents to [address] [place] [address] with worsening shortness of breath at rest for "months", increased LE edema and 11 pounds of weight gain over the past several weeks. Known to Dr. Pérez Suggs with our office. She was recently admitted from 8/16 to 8/22/2023 for acute CHF exacerbation due to dietary indiscretion, she has had multiple admissions of the same. Her home torsemide was increased from 60 mg -> 80 mg twice daily which she has been compliant with. She followed in the office with my colleague Viviana Pacheco PA-C. She admitted to continued dietary indiscretion and was counseled on salt and fluid restrictions, despite this she admits to drinking copious amount of fluids as she is very thirsty throughout the day and indulges in salty/premade foods frequently such as pretzels as well as canned chicken and soups prior to arrival.  She was at an outpatient pulmonology appointment where she was discovered to be particularly short of breath and was advised to present to the ED for evaluation. She was treated with IV Bumex 2 mg x 1 in the ED with <500 mL urine output per patient. CBC, BMP within normal limits. CXR with minimal pulmonary vascular congestion. Past Medical History:        Past Medical History:   Diagnosis Date   • Acute blood loss anemia 6/1/2021   • Acute diverticulitis 5/2/2021   • Alcohol abuse 5/21/2020   • Anemia    • Atrial fibrillation [facility])    • Cervical radiculopathy    • CHF (congestive heart failure) (HCC)    • Chronic low back pain    • Chronic obstructive asthma ([address]) 2/20/2018   • Cigarette nicotine dependence without complication 23/78/9460    Quit 12/10/2019.     • Community acquired pneumonia 5/21/2020   • Depression with anxiety 3/9/2014   • Diabetes mellitus with hyperglycemia ([address])    • Diverticulitis 6/6/2021   • Elevated liver enzymes    • GERD (gastroesophageal reflux disease)    • Hypersomnolence 10/28/2020   • Hypokalemia 2018   • Lower gastrointestinal bleeding 2021   • Paresthesia of upper extremity    • Plantar fasciitis    • Restless legs syndrome 2014   • Sexual dysfunction    • Sleep apnea, obstructive    • Tenosynovitis of finger    • Tinea corporis    • Tobacco use 2018    Currently smoking 3-4 cigarettes daily   • Trigger middle finger of left hand 2017   • Trigger ring finger of left hand 2017   • Weakness of upper extremity       Past Surgical History:   Procedure Laterality Date   • ABDOMINAL SURGERY     • CARPAL TUNNEL RELEASE Bilateral    •  SECTION     • CHOLECYSTECTOMY      laparoscopic   • ESOPHAGOGASTRODUODENOSCOPY N/A 12/3/2018    Procedure: ESOPHAGOGASTRODUODENOSCOPY (EGD) AND COLONOSCOPY;  Surgeon: Mally Klein MD;  Location: [facility];  Service: Gastroenterology   • GASTRIC BYPASS      for morbid obesity with gilda en y   • HERNIA REPAIR     • HYSTERECTOMY     • SC EXCISION GANGLION WRIST DORSAL/VOLAR PRIMARY Left 2017    Procedure: LONG FINGER GANGLION CYST EXCISION;  Surgeon: Stacey Garcia MD;  Location: [facility];  Service: Orthopedics   • SC TENDON SHEATH INCISION Left 2017    Procedure: THUMB, LONG, RING, TRIGGER FINGER RELEASE;  Surgeon: Stacey Garcia MD;  Location: [facility];  Service: Orthopedics   • SHOULDER SURGERY Right         Allergy:        Allergies   Allergen Reactions   • Iron Dextran Swelling   • Januvia [Sitagliptin] Shortness Of Breath   • Jardiance [Empagliflozin] Shortness Of Breath   • Clonazepam Other (See Comments)     Unknown reaction   • Codeine Itching and Other (See Comments)     itch;mary kay morphine,takes ultram @home   • Adhesive [Medical Tape] Itching     itching   • Effexor [Venlafaxine] Itching   • Lactose - Food Allergy GI Intolerance   • Oxycodone-Acetaminophen Anxiety   • Oxycodone-Acetaminophen Itching and Rash     Other reaction(s):  Other (See Comments)  (PERCOCET) MILD RASH, not allergic to Acetaminophen        Medications:       Prior to Admission medications    Medication Sig Start Date End Date Taking? Authorizing Provider   atorvastatin (LIPITOR) 40 mg tablet Take 1 tablet (40 mg total) by mouth daily 3/29/23  Yes Mima Villagran MD   albuterol (2.5 mg/3 mL) 0.083 % nebulizer solution Take 3 mL (2.5 mg total) by nebulization every 6 (six) hours as needed for wheezing or shortness of breath 6/8/23   Gillian Osman MD   albuterol (PROVENTIL HFA,VENTOLIN HFA) 90 mcg/act inhaler Inhale 2 puffs every 4 (four) hours as needed for wheezing 6/8/23   Gillian Osman MD   apixaban (Eliquis) 5 mg Take 1 tablet (5 mg total) by mouth 2 (two) times a day 8/7/23   Mima Villagran MD   Benralizumab 30 MG/ML SOAJ Inject 1 mL under the skin every 28 days 6/8/23   Gillian Osman MD   Blood Glucose Monitoring Suppl (Peer5 Contour Link 2. 4) w/Device KIT Test 3 times daily 8/31/23   Laith Olivares MD   budesonide (PULMICORT) 0.5 mg/2 mL nebulizer solution TAKE 2 ML (0.5 MG TOTAL) BY NEBULIZATION TWICE A DAY RINSE MOUTH AFTER USE 8/14/23   Gillian Osman MD   Contour Next Test test strip USE TO TEST BLOOD SUGAR 3 TIMES A DAY 6/28/23   Laith Olivares MD   [employer] Lancets Thin 26G MISC USE 3 (THREE) TIMES A DAY 5/4/22   Laith Olivares MD   EPINEPHrine (EPIPEN) 0.3 mg/0.3 mL SOAJ Inject 0.3 mL (0.3 mg total) into a muscle once for 1 dose 12/21/22 8/31/23  Suyapa España PA-C   escitalopram (LEXAPRO) 20 mg tablet TAKE 1 TABLET BY MOUTH EVERY DAY 7/27/23   Laith Olivares MD   ferrous sulfate 220 (44 Fe) mg/5 mL solution TAKE 5 ML (220 MG TOTAL) BY MOUTH 2 (TWO) TIMES A DAY BEFORE MEALS 2/1/22 9/5/23  Cedrick Munoz MD   fluticasone [facility]) 50 mcg/act nasal spray 1 spray into each nostril 2 (two) times a day 9/23/20   Gillian Osman MD   glycopyrrolate-formoterol (BEVESPI AEROSPHERE) 9-4.8 MCG/ACT inhaler Inhale 2 puffs 2 (two) times a day 6/8/23   Gillian Osman MD insulin glulisine (Apidra SoloStar) 100 units/mL injection pen 24 UNITS BREAKFAST 15 UNITS AT LUNCH AND DINNER  Patient taking differently: 30 UNITS BREAKFAST 15 UNITS AT LUNCH AND DINNER 7/26/23   Yevgeniy Seay PA-C   Insulin Pen Needle (BD Pen Needle Love 2nd Gen) 32G X 4 MM MISC Use daily at bedtime Use 4 new needles daily . 2/3/22   MD Karyn Wall SoloStar 100 units/mL SOPN INJECT 0.3 ML (30 UNITS TOTAL) UNDER THE SKIN DAILY AT BEDTIME 8/14/23   Pramod Carrington PA-C   levalbuterol (XOPENEX) 1.25 mg/0.5 mL nebulizer solution Take 0.5 mL (1.25 mg total) by nebulization 2 (two) times a day 11/16/20   Cristhian Oseguera DO   loratadine (CLARITIN) 10 mg tablet Take by mouth    Topher Barlow MD   LORazepam (ATIVAN) 0.5 mg tablet TAKE 2 TABLETS BY MOUTH AT BEDTIME AND 1 EVERY 6 HOURS AS NEEDED FOR ANXIETY 8/23/23   Laith Olivares MD   metFORMIN (GLUCOPHAGE-XR) 500 mg 24 hr tablet TAKE 2 TABLETS BY MOUTH TWICE A DAY WITH FOOD 7/27/23   Laith Olivares MD   metoprolol succinate (TOPROL-XL) 100 mg 24 hr tablet Take 1 tablet (100 mg total) by mouth daily 3/29/23   Tonie Iverson MD   montelukast (SINGULAIR) 10 mg tablet TAKE 1 TABLET BY MOUTH DAILY AT BEDTIME 9/16/22   Laith Olivares MD   naloxone (NARCAN) 4 mg/0.1 mL nasal spray Administer 1 spray into a nostril. If breathing does not return to normal or if breathing difficulty resumes after 2-3 minutes, give another dose in the other nostril using a new spray. 7/29/20   Laith Olivares MD   omeprazole (PriLOSEC) 40 MG capsule TAKE 1 CAPSULE (40 MG TOTAL) BY MOUTH DAILY.  6/25/23   Laith Olivares MD   potassium chloride (K-DUR,KLOR-CON) 20 mEq tablet Take 2 tablets (40 mEq total) by mouth daily Do not start before August 23, 2023. 8/23/23   Verna Nelson MD   potassium chloride (K-DUR,KLOR-CON) 20 mEq tablet Take 1 tablet (20 mEq total) by mouth every evening 8/22/23   Verna Nelson MD   semaglutide, 0.25 or 0.5 mg/dose, (Ozempic, 0.25 or 0.5 MG/DOSE,) 2 mg/3 mL injection pen Inject 0.75 mL (0.5 mg total) under the skin every 7 days 5/17/23   Allie Martínez PA-C   Torsemide 40 MG TABS Take 80 mg by mouth 2 (two) times a day 9/1/23   Linnea Sanchez MD   traZODone (DESYREL) 150 mg tablet TAKE 1 TABLET BY MOUTH AT BEDTIME 7/27/23   Laith Olivares MD       Family History:     Family History   Problem Relation Age of Onset   • Alzheimer's disease Mother    • Atrial fibrillation Mother    • Dementia Mother    • Heart disease Mother         heart problem   • Seizures Mother    • Parkinsonism Father    • Arthritis Father    • Atrial fibrillation Father    • No Known Problems Daughter    • Cri-du-chat syndrome Daughter    • Colon cancer Maternal Grandmother 80   • Diabetes type II Maternal Grandmother    • No Known Problems Brother    • No Known Problems Son    • Substance Abuse Neg Hx         mother,father   • Mental illness Neg Hx         mother,father   • Colon polyps Neg Hx         Social History:       Social History     Socioeconomic History   • Marital status: Significant Other     Spouse name: None   • Number of children: None   • Years of education: None   • Highest education level: None   Occupational History   • None   Tobacco Use   • Smoking status: Former     Packs/day: 0.25     Years: 29.00     Total pack years: 7.25     Types: Cigarettes     Start date: 200     Quit date: 12/10/2019     Years since quitting: 3.7   • Smokeless tobacco: Never   Vaping Use   • Vaping Use: Never used   Substance and Sexual Activity   • Alcohol use: Not Currently     Alcohol/week: 20.0 standard drinks of alcohol     Types: 20 Cans of beer per week     Comment: about 6 coors light every night, stopped in 2019   • Drug use: No   • Sexual activity: Not Currently   Other Topics Concern   • None   Social History Narrative   • None     Social Determinants of Health     Financial Resource Strain: Not on file   Food Insecurity: No Food Insecurity (8/14/2023)    Hunger Vital Sign    • Worried About Running Out of Food in the Last Year: Never true    • Ran Out of Food in the Last Year: Never true   Transportation Needs: No Transportation Needs (8/14/2023)    PRAPARE - Transportation    • Lack of Transportation (Medical): No    • Lack of Transportation (Non-Medical): No   Physical Activity: Not on file   Stress: Not on file   Social Connections: Not on file   Intimate Partner Violence: Not on file   Housing Stability: Low Risk  (8/14/2023)    Housing Stability Vital Sign    • Unable to Pay for Housing in the Last Year: No    • Number of Places Lived in the Last Year: 1    • Unstable Housing in the Last Year: No       Weights/BMI:    Wt Readings from Last 3 Encounters:   09/06/23 130 kg (287 lb 9.6 oz)   09/05/23 130 kg (287 lb 3.2 oz)   08/31/23 130 kg (287 lb)   , Body mass index is 50.95 kg/m². ROS:  14 point ROS negative except as outlined above  Remainder review of systems is negative    Exam:  General: Alert, oriented and in no acute distress, cooperative  Head: Normocephalic, atraumatic. Eyes: PERRLA. No icterus. Normal Conjunctiva. Oropharynx: Moist and normal-appearing mucosa  Neck: Supple, symmetrical, trachea midline, JVD not appreciated.    Heart: RRR, no murmur, rub or gallop, S1 & S2 normal   Lungs: Decreased breath sounds bilaterally  Abdomen: Flat, normal findings: bowel sounds normal and soft, non-tender  Lower Limbs: 1-2+ pitting edema bilateral lower extremities, 2+ peripheral pulses, capillary refill within normal limits  Musculoskeletal: ROM grossly normal

## 2023-09-06 NOTE — ASSESSMENT & PLAN NOTE
Wt Readings from Last 3 Encounters:   09/06/23 130 kg (287 lb 9.6 oz)   09/05/23 130 kg (287 lb 3.2 oz)   08/31/23 130 kg (287 lb)     · Patient was recently discharged from hospital on 8/22, since discharge noted to have 10 pound weight gain. · Chest x-ray concerning for vascular congestion. · She received IV Bumex in ED, will start on IV Bumex. · Monitor daily weights, ins and outs. · Despite patient being compliant on oral torsemide per cardiology recommendation since his last discharge patient presented with acute on chronic CHF, will consult cardiology for discharge regimen.

## 2023-09-06 NOTE — UTILIZATION REVIEW
Initial Clinical Review    Admission: Date/Time/Statement:   Admission Orders (From admission, onward)     Ordered        09/05/23 1615  INPATIENT ADMISSION  Once                      Orders Placed This Encounter   Procedures   • INPATIENT ADMISSION     Standing Status:   Standing     Number of Occurrences:   1     Order Specific Question:   Level of Care     Answer:   Med Surg [16]     Order Specific Question:   Estimated length of stay     Answer:   More than 2 Midnights     Order Specific Question:   Certification     Answer:   I certify that inpatient services are medically necessary for this patient for a duration of greater than two midnights. See H&P and MD Progress Notes for additional information about the patient's course of treatment. ED Arrival Information     Expected   -    Arrival   9/5/2023 14:27    Acuity   Emergent            Means of arrival   Wheelchair    Escorted by   Friend    Service   Hospitalist    Admission type   Emergency            Arrival complaint   Sob           Chief Complaint   Patient presents with   • Shortness of Breath     Pt c/o SOB x1 week, sent by pulmonology for admission. Pt states "this is my 4th admission this year". Initial Presentation: 57 y.o. female from home to ED, per Pulmonary, admitted inpatient due to acute on chronic diastolic CHF/Severe COPD/chronic hypercapnic respiratory failure. Uses home oxygen 4 liters. Presented due to worsening shortness of breath  And weight gain of 10 lbs since 8/22/23. On exam: obese. Appears ill. Tachypnea and respiratory distress. Lungs decreased breath sounds. On oxygen 4 liters. Bilateral LE edema. Lactic acid 2.5. CO2-36. Glucose 277. Wbc 10.78. H&H 10.8/38. 8. PCO2 59. CxR showed pulmonary vascular congestion. In the ED given Rodríguez nicolas, Solu medrol, Mg, Bumex and started on azithromycin. Plan is another dose of Bumex. Fluid and sodium restriction. Trend BMP.   Continue IV Solumedrol, home oxygen, Continue home Lantus,  Adjust home Humalog,  SSI with accuchecks. Date: 9/6/23    Day 2:  Shortness of breath slightly better. Admits to dietary non compliance. Remains on oxygen 4 liters,  Episode of hypoxia. On exam: obese. Lungs decreased breath sounds. Bilateral LE edema. Telemetry sinus. Wbc 11.44. CO2 35. Glucose 340. Plan is daily IV Bumex, consult Cardiology. Decrease IV Solumedrol, continue nebs. Home Lantus increased and Premeal insulin adjusted. Continue SSI with accuchecks     9/6/23 per Cardiology - patient with acute on chronic heart failure/chronic respiratory failure/PAF on Eliquis and Toprol XL/T2DM/COPD/WILMA. Suspect dietary non compliance is etiology of CHF. Plan is increase IV Bumex. Strict adherence to fluid and salt restrictions.      ED Triage Vitals   Temperature Pulse Respirations Blood Pressure SpO2   09/05/23 1438 09/05/23 1438 09/05/23 1438 09/05/23 1438 09/05/23 1438   98.5 °F (36.9 °C) 86 20 115/70 92 %      Temp Source Heart Rate Source Patient Position - Orthostatic VS BP Location FiO2 (%)   09/05/23 1438 09/05/23 1438 09/05/23 1438 09/05/23 1438 --   Temporal Monitor Sitting Right arm       Pain Score       09/05/23 1734       No Pain          Wt Readings from Last 1 Encounters:   09/06/23 130 kg (287 lb 9.6 oz)     Additional Vital Signs:   /06/23 0721 97.4 °F (36.3 °C) Abnormal  78 -- 122/60 81 90 % 36 -- 4 L/min Nasal cannula -- --   09/06/23 07:20:54 97.4 °F (36.3 °C) Abnormal  78 18 122/60 81 87 % Abnormal  -- -- -- -- -- --   09/06/23 0528 -- -- -- -- -- 97 % -- -- -- -- -- --   09/06/23 0435 -- -- -- -- -- 97 % -- -- -- -- Face mask --   09/05/23 21:10:43 97.9 °F (36.6 °C) 88 20 117/58 78 99 % -- -- -- -- -- --   09/05/23 1900 -- -- -- -- -- 96 % -- -- -- -- Face mask --   09/05/23 17:34:41 97.9 °F (36.6 °C) 82 22 123/57 79 96 % 36 -- 4 L/min Nasal cannula -- Sitting   09/05/23 1600 -- 75 22 121/58 84 99 % -- -- -- Nasal cannula -- Sitting 09/05/23 1530 -- 74 20 120/58 -- 100 % -- -- -- -- -- Sitting   09/05/23 1519 -- -- -- -- -- 94 % 36 -- 4 L/min Nasal cannula --      Pertinent Labs/Diagnostic Test Results:   XR chest portable   ED Interpretation by Adonis Cr DO (09/05 1508)   Abnormal   Pulmonary vascular congestion unchanged from 8/16/2023 as interpreted by me independently       Final Result by Boogie Mendoza MD (09/06 0945)      No acute cardiopulmonary disease. Resident: Stephanie Escalera, the attending radiologist, have reviewed the images and agree with the final report above.       Workstation performed: [id number]           9/5/23 ecg Interpretation: normal     Rate:     ECG rate:  73     ECG rate assessment: normal     Rhythm:     Rhythm: sinus rhythm     Ectopy:     Ectopy: none     QRS:     QRS axis:  Normal     QRS intervals:  Normal   Conduction:     Conduction: normal     ST segments:     ST segments:  Normal   T waves:     T waves: normal     Comments:      qtc 431    Results from last 7 days   Lab Units 09/05/23  1514   SARS-COV-2  Negative     Results from last 7 days   Lab Units 09/06/23  0504 09/05/23  1514 09/05/23  1511   WBC Thousand/uL 11.44* 10.78*  --    HEMOGLOBIN g/dL 10.0* 10.8*  --    I STAT HEMOGLOBIN g/dl  --   --  12.6   HEMATOCRIT % 34.8 38.3  --    HEMATOCRIT, ISTAT %  --   --  37   PLATELETS Thousands/uL 290 321  --    NEUTROS ABS Thousands/µL 10.38* 8.46*  --      Results from last 7 days   Lab Units 09/06/23  0504 09/05/23  1514 09/05/23  1511   SODIUM mmol/L 134* 137  --    POTASSIUM mmol/L 4.2 3.9  --    CHLORIDE mmol/L 93* 94*  --    CO2 mmol/L 35* 36*  --    CO2, I-STAT mmol/L  --   --  38*   ANION GAP mmol/L 6 7  --    BUN mg/dL 15 11  --    CREATININE mg/dL 0.53* 0.57*  --    EGFR ml/min/1.73sq m 100 98  --    CALCIUM mg/dL 8.9 9.0  --    CALCIUM, IONIZED, ISTAT mmol/L  --   --  1.14   MAGNESIUM mg/dL 2.3 2.0  --      Results from last 7 days   Lab Units 09/06/23  5032 09/05/23  1514   AST U/L 18 19   ALT U/L 26 21   ALK PHOS U/L 140* 147*   TOTAL PROTEIN g/dL 6.6 6.9   ALBUMIN g/dL 4.1 4.1   TOTAL BILIRUBIN mg/dL 0.39 0.36     Results from last 7 days   Lab Units 09/06/23  0718 09/05/23  2351 09/05/23 2051 09/05/23  1818   POC GLUCOSE mg/dl 355* 443* 433* 288*     Results from last 7 days   Lab Units 09/06/23  0504 09/05/23  1514   GLUCOSE RANDOM mg/dL 340* 277*     BETA-HYDROXYBUTYRATE   Date Value Ref Range Status   06/03/2023 0.2 <0.6 mmol/L Final      Results from last 7 days   Lab Units 09/05/23  1511   PH, DARVIN I-STAT  7.392   PCO2, DARVIN ISTAT mm HG 59.0*   PO2, DARVIN ISTAT mm HG 45.0   HCO3, DARVIN ISTAT mmol/L 35.9*   I STAT BASE EXC mmol/L 9*   I STAT O2 SAT % 79     Results from last 7 days   Lab Units 09/05/23 2055 09/05/23  1853 09/05/23  1514   HS TNI 0HR ng/L  --   --  5   HS TNI 2HR ng/L  --  4  --    HSTNI D2 ng/L  --  -1  --    HS TNI 4HR ng/L 4  --   --    HSTNI D4 ng/L -1  --   --      Results from last 7 days   Lab Units 09/05/23  1514   PROTIME seconds 14.7*   INR  1.07   PTT seconds 27     Results from last 7 days   Lab Units 09/05/23  1514   PROCALCITONIN ng/ml <0.05     Results from last 7 days   Lab Units 09/05/23 1853 09/05/23  1514   LACTIC ACID mmol/L 4.6* 2.5*     Results from last 7 days   Lab Units 09/05/23  1514   BNP pg/mL 99     Results from last 7 days   Lab Units 09/05/23  1815   CLARITY UA  Clear   COLOR UA  Yellow   SPEC GRAV UA  1.015   PH UA  6.0   GLUCOSE UA mg/dl 70 (7/100%)*   KETONES UA mg/dl Negative   BLOOD UA  Negative   PROTEIN UA mg/dl Negative   NITRITE UA  Negative   BILIRUBIN UA  Negative   UROBILINOGEN UA (BE) mg/dl <2.0   LEUKOCYTES UA  Trace*   WBC UA /hpf 2-4   RBC UA /hpf 0-1   BACTERIA UA /hpf Occasional   EPITHELIAL CELLS WET PREP /hpf Occasional     Results from last 7 days   Lab Units 09/05/23  1514   INFLUENZA A PCR  Negative   INFLUENZA B PCR  Negative   RSV PCR  Negative     Results from last 7 days   Lab Units 09/05/23  1514   BLOOD CULTURE  Received in Microbiology Lab. Culture in Progress. Received in Microbiology Lab. Culture in Progress. ED Treatment:   Medication Administration from 09/05/2023 1426 to 09/05/2023 1714       Date/Time Order Dose Route Action Comments     09/05/2023 1512 EDT albuterol inhalation solution 10 mg 10 mg Nebulization Given --     09/05/2023 1512 EDT ipratropium (ATROVENT) 0.02 % inhalation solution 1 mg 1 mg Nebulization Given --     09/05/2023 1512 EDT sodium chloride 0.9 % inhalation solution 12 mL 12 mL Nebulization Given --     09/05/2023 1515 EDT methylPREDNISolone sodium succinate (Solu-MEDROL) injection 125 mg 125 mg Intravenous Given --     09/05/2023 1516 EDT magnesium sulfate 2 g/50 mL IVPB (premix) 2 g 2 g Intravenous New Bag --     09/05/2023 1619 EDT bumetanide (BUMEX) injection 2 mg 2 mg Intravenous Given --     09/05/2023 1620 EDT azithromycin (ZITHROMAX) 500 mg in sodium chloride 0.9% 250mL IVPB 500 mg 500 mg Intravenous New Bag --        Past Medical History:   Diagnosis Date   • Acute blood loss anemia 6/1/2021   • Acute diverticulitis 5/2/2021   • Alcohol abuse 5/21/2020   • Anemia    • Atrial fibrillation ([address])    • Cervical radiculopathy    • CHF (congestive heart failure) ([address])    • Chronic low back pain    • Chronic obstructive asthma ([address]) 2/20/2018   • Cigarette nicotine dependence without complication 99/33/0197    Quit 12/10/2019.     • Community acquired pneumonia 5/21/2020   • Depression with anxiety 3/9/2014   • Diabetes mellitus with hyperglycemia ([address])    • Diverticulitis 6/6/2021   • Elevated liver enzymes    • GERD (gastroesophageal reflux disease)    • Hypersomnolence 10/28/2020   • Hypokalemia 12/4/2018   • Lower gastrointestinal bleeding 6/1/2021   • Paresthesia of upper extremity    • Plantar fasciitis    • Restless legs syndrome 4/8/2014   • Sexual dysfunction    • Sleep apnea, obstructive    • Tenosynovitis of finger    • Tinea corporis    • Tobacco use 2/20/2018    Currently smoking 3-4 cigarettes daily   • Trigger middle finger of left hand 12/14/2017   • Trigger ring finger of left hand 12/14/2017   • Weakness of upper extremity      Present on Admission:  • Paroxysmal atrial fibrillation ([facility])  • Morbid obesity ([facility])  • Lactic acidosis  • Hypercholesterolemia  • Acute on chronic diastolic congestive heart failure ([facility])  • Chronic hypercapnic respiratory failure ([facility])  • Esophageal reflux  • Obstructive sleep apnea  • COPD, severe ([facility])      Admitting Diagnosis: Acute-on-chronic respiratory failure ([facility]) [J96.20]  SOB (shortness of breath) [R06.02]  Acute exacerbation of CHF (congestive heart failure) ([facility]) [I50.9]  COPD with acute exacerbation ([address] ) [J44.1]  Age/Sex: 57 y.o. female  Admission Orders:  09/05/23 1615 inpatient   Scheduled Medications:  apixaban, 5 mg, Oral, BID  atorvastatin, 40 mg, Oral, Daily  bumetanide, 2 mg, Intravenous, Daily increased 9/6/23 to 4 mg IV two times daily  escitalopram, 20 mg, Oral, Daily  ferrous sulfate, 150 mg, Oral, Daily  fluticasone, 1 spray, Nasal, BID  insulin glargine, 40 Units, Subcutaneous, HS  insulin lispro, 2-12 Units, Subcutaneous, TID AC  insulin lispro, 2-12 Units, Subcutaneous, HS  insulin lispro, 20 Units, Subcutaneous, TID With Meals  ipratropium, 0.5 mg, Nebulization, TID  levalbuterol, 1.25 mg, Nebulization, BID  [START ON 9/7/2023] methylPREDNISolone sodium succinate, 40 mg, Intravenous, Daily  metoprolol succinate, 100 mg, Oral, Daily  montelukast, 10 mg, Oral, HS  pantoprazole, 40 mg, Oral, Early Morning  potassium chloride, 40 mEq, Oral, Daily  traZODone, 150 mg, Oral, HS    bumetanide (BUMEX) injection 1 mg  Dose: 1 mg  Freq:  Once Route: IV  Start: 09/05/23 1945 End: 09/05/23 2014  insulin glargine (LANTUS) subcutaneous injection 30 Units 0.3 mL  Dose: 30 Units  Freq: Daily at bedtime Route: SC  Start: 09/05/23 2200 End: 09/05/23 2355  insulin lispro (HumaLOG) 100 units/mL subcutaneous injection 10 Units  Dose: 10 Units  Freq: Once Route: SC  Start: 09/06/23 0000 End: 09/06/23 0000  methylPREDNISolone sodium succinate (Solu-MEDROL) injection 40 mg  Dose: 40 mg  Freq: Every 12 hours scheduled Route: IV  Start: 09/06/23 0900 End: 09/06/23 0846    bumetanide (BUMEX) injection 2 mg  Dose: 2 mg  Freq: Once Route: IV  Start: 09/06/23 1030 End: 09/06/23 1037    Continuous IV Infusions:     PRN Meds:  acetaminophen, 650 mg, Oral, Q6H PRN x 1 9/5  aluminum-magnesium hydroxide-simethicone, 30 mL, Oral, Q6H PRN  LORazepam, 0.5 mg, Oral, HS PRN x 1 9/5  ondansetron, 4 mg, Intravenous, Q6H PRN  senna, 1 tablet, Oral, HS PRN    Telemetry   Fluid restriction   Bipap at bedtime. IP CONSULT TO CARDIOLOGY    Network Utilization Review Department  ATTENTION: Please call with any questions or concerns to [phone] and carefully listen to the prompts so that you are directed to the right person. All voicemails are confidential.  Nirmal Hurley all requests for admission clinical reviews, approved or denied determinations and any other requests to dedicated fax number below belonging to the campus where the patient is receiving treatment.  List of dedicated fax numbers for the Facilities:  [place] DENIALS (Administrative/Medical Necessity) [phone] [address] [facility] (Maternity/NICU/Pediatrics) [phone]   [address] [place] [address] Drive [phone]   [facility] [address] [facility] [phone] [address] [place] [address] [facility] [phone]   [address] [facility] [address] [phone]   [address] [place] [address] [address] [address] [phone]

## 2023-09-06 NOTE — ASSESSMENT & PLAN NOTE
Lab Results   Component Value Date    HGBA1C 9.9 (H) 05/11/2023       Recent Labs     09/05/23  1818 09/05/23  2051 09/05/23  2351 09/06/23  0718   POCGLU 288* 433* 443* 355*       Blood Sugar Average: Last 72 hrs:  (P) 379.75   Outpatient regimen: Patient is on Lantus 30 units, Humalog 24 units with breakfast and 15 units with lunch and dinner, metformin and Ozempic. Inpatient regimen: Will increase Lantus to 40 units, increase Premeal to 20 units, continue with sliding scale insulin. Started on hypoglycemia protocol. Continue Carb counting diet.

## 2023-09-06 NOTE — UTILIZATION REVIEW
NOTIFICATION OF INPATIENT ADMISSION   AUTHORIZATION REQUEST   SERVICING FACILITY:   [address] [place] [address]  [address] [facility]Third [address] [address]  Tax ID: [id number]  NPI: [id number] ATTENDING PROVIDER:  Attending Name and NPI#: Bismark Elam Md [[id number]]  Address: [facility] [address] [facility]Third [address] [address]  Phone: [phone]   ADMISSION INFORMATION:  Place of Service: [address] [place] [address]  Place of Service Code: 21  Inpatient Admission Date/Time: 9/5/23  4:16 PM  Discharge Date/Time: No discharge date for patient encounter. Admitting Diagnosis Code/Description:  Acute-on-chronic respiratory failure (HCC) [J96.20]  SOB (shortness of breath) [R06.02]  Acute exacerbation of CHF (congestive heart failure) ([address]) [I50.9]  COPD with acute exacerbation ([address]) [J44.1]     UTILIZATION REVIEW CONTACT:  Guillermo Limon Utilization   Network Utilization Review Department  Phone: [phone]  Fax [phone]  Email: [email or web address]  Contact for approvals/pending authorizations, clinical reviews, and discharge. PHYSICIAN ADVISORY SERVICES:  Medical Necessity Denial & Gjoi-xr-Jbld Review  Phone: [phone]  Fax: [phone]  Email: [email or web address]

## 2023-09-06 NOTE — CASE MANAGEMENT
Case Management Assessment & Discharge Planning Note    Patient name Ayla Neal  Location /-60 MRN [id number]  : 1960 Date 2023       Current Admission Date: 2023  Current Admission Diagnosis:Acute on chronic diastolic congestive heart failure [facility])   Patient Active Problem List    Diagnosis Date Noted   • Eosinophilic asthma    • COPD, severe ([address]) 2023   • Hepatic steatosis 2023   • Morbid obesity ([address]) 02/15/2023   • Diabetic polyneuropathy associated with type 2 diabetes mellitus ([address]) 11/15/2021   • Type 2 diabetes mellitus with hyperglycemia ([address]) 2021   • Tubular adenoma of colon 2021   • Transaminitis 2021   • Gastric polyp 2021   • Class 3 severe obesity in adult [facility]) 2021   • Pulmonary hypertension ([address]) 2020   • Asthma with COPD with exacerbation ([address]) 2020   • Chronic hypercapnic respiratory failure ([address]) 2020   • Insomnia 10/28/2020   • Abnormal CT of the chest 2020   • COPD exacerbation ([address]) 2020   • Lactic acidosis 2020   • Acute on chronic diastolic congestive heart failure ([address]) 2020   • Microcytic anemia 2020   • Neck pain, chronic 2019   • Current moderate episode of major depressive disorder without prior episode ([address]) 2019   • Paroxysmal atrial fibrillation ([address]) 2018   • Severe persistent asthma 2018   • Acute on chronic respiratory failure with hypoxia ([address]) 2018   • Abnormal computed tomography angiography (CTA) of abdomen 2018   • Abnormal CT of the abdomen 2018   • Iron deficiency anemia due to chronic blood loss 2018   • Type 2 diabetes mellitus with stage 2 chronic kidney disease, with long-term current use of insulin ([address])    • Severe persistent asthma with exacerbation 2018   • Ganglion cyst of flexor tendon sheath 2017   • Paresthesia of upper extremity 2017   • Cervical radiculopathy 09/13/2017   • Elevated liver enzymes 12/30/2015   • Sexual dysfunction 11/11/2014   • Chronic low back pain 10/15/2014   • Hypercholesterolemia 09/09/2014   • Lumbar radiculopathy 09/09/2014   • Esophageal reflux 06/23/2014   • Hypertensive heart and chronic kidney disease with heart failure and stage 1 through stage 4 chronic kidney disease, or chronic kidney disease ([facility]) 03/24/2014   • Obstructive sleep apnea 03/09/2014      LOS (days): 1  Geometric Mean LOS (GMLOS) (days):   Days to GMLOS:     OBJECTIVE:  PATIENT READMITTED TO HOSPITAL  Risk of Unplanned Readmission Score: 30.93      Current admission status: Inpatient    Preferred Pharmacy:   [facility]/pharmacy #[id number] [id number] [place] [address]  Phone: [phone] Fax: [phone]    [address] [place] [address] [address] [place] [address]  [address] [place] [address] [address]  Phone: [phone] Fax: [phone]    Primary Care Provider: Alexus Ramirez MD    Primary Insurance: [facility]  Secondary Insurance:     ASSESSMENT:  [address], [address] Landon Santos Representative - Daughter   Primary Phone: [phone] (Mobile)               Advance Directives  Does patient have a [address]?: No  Was patient offered paperwork?: Yes  Does patient have Advance Directives?: No  Was patient offered paperwork?: Yes  Primary Contact: Deidre Lang: dtr: [phone]    Readmission Root Cause  30 Day Readmission: No, Yes  Who directed you to return to the hospital?: Self, Specialist  Did you understand whom to contact if you had questions or problems?: Yes  Did you get your prescriptions before you left the hospital?: No  Reason[de-identified] Delivery service not offered, Preference for own pharmacy  Were you able to get your prescriptions filled when you left the hospital?: Yes  Did you take your medications as prescribed?: Yes  Were you able to get to your follow-up appointments?: Yes  During previous admission, was a post-acute recommendation made?: Yes  What post-acute resources were offered?: [address] [facility]  Patient was readmitted due to: worsening shortness of breath  Action Plan: IV diuretics. Cardiology    Patient Information  Admitted from[de-identified] Home  Mental Status: Alert  During Assessment patient was accompanied by: Not accompanied during assessment  Assessment information provided by[de-identified] Patient  Primary Caregiver: Self  Support Systems: Self, Friend, Family members  [place] of Residence: [address] [place] [address] do you live in?: [address] entry access options.  Select all that apply.: Stairs  Number of steps to enter home.: 1  Type of Current Residence: [place]  In the last 12 months, was there a time when you were not able to pay the mortgage or rent on time?: No  In the last 12 months, how many places have you lived?: 1  In the last 12 months, was there a time when you did not have a steady place to sleep or slept in a shelter (including now)?: No  Homeless/housing insecurity resource given?: N/A  Living Arrangements: Lives Alone  Is patient a ?: No    Activities of Daily Living Prior to Admission  Functional Status: Independent  Completes ADLs independently?: Yes  Ambulates independently?: Yes (uses walker)  Level of ambulatory dependence: Assistance  Does patient use assisted devices?: Yes  Assisted Devices (DME) used: Gautam Gulling  Does patient currently own DME?: Yes  What DME does the patient currently own?: BiPAP, CPAP, Portable Oxygen concentrator, Portable Oxygen tanks, Home Oxygen concentrator, Nebulizer, Walker  Does patient have a history of Outpatient Therapy (PT/OT)?: No  Does the patient have a history of Short-Term Rehab?: No  Does patient have a history of HHC?: Yes ([facility])  Does patient currently have [address] [facility]?: Yes    Current Home Health Care  Type of Current Home Care Services: Home PT, Nurse visit  [facility][de-identified] [facility] Health Follow-Up Provider[de-identified] PCP    Patient Information Continued  Income Source: Government aid  Does patient have prescription coverage?: Yes  Within the past 12 months, you worried that your food would run out before you got the money to buy more.: Never true  Within the past 12 months, the food you bought just didn't last and you didn't have money to get more.: Never true  Food insecurity resource given?: N/A  Does patient receive dialysis treatments?: No  Does patient have a history of substance abuse?: No  Does patient have a history of Mental Health Diagnosis?: No    Means of Transportation  Means of Transport to Appts[de-identified] Drives Self  In the past 12 months, has lack of transportation kept you from medical appointments or from getting medications?: No  In the past 12 months, has lack of transportation kept you from meetings, work, or from getting things needed for daily living?: No  Was application for public transport provided?: N/A    DISCHARGE DETAILS:                                [address]         Is the patient interested in [facility] [address] at discharge?: Yes  [facility] requested[de-identified] Nursing, Physical [address] [facility] Name[de-identified] [facility] External Referral Reason (only applicable if external HHA name selected): Patient has established relationship with provider  [facility] Provider[de-identified] PCP  Home Health Services Needed[de-identified] Evaluate Functional Status and Safety, Strengthening/Theraputic Exercises to Improve Function, Gait/ADL Training  Homebound Criteria Met[de-identified] Uses an Assist Device (i.e. cane, walker, etc), Requires the Assistance of Another Person for Safe Ambulation or to Leave the Home  Supporting Clincal Findings[de-identified] Limited Endurance, Requires Oxygen    Additional Comments: Met with Pt. Pt presents AA&Ox3. Discussed role of case management. Pt lives alone in 1sh, 1 paxton. Uses walker. Has oxygen through [employer], [employer], CPAP, Bipap, Nebulizer.  Pt reports she drives and her friends also assist with transportation and grocery shopping. Pt reports she does not have POA or living will and agreeable for information. Pt reports she is current with [facility] and denies hx of SNF/MH/D&A. Pt is requesting wheelchair to be ordered upon discharge. Pt choosing [employer] for DME. Pt reports she is agreeable to resume [facility] upon discharge. Wheelchair referral sent to [address] [facility] via [facility]. Referral sent to [facility] via [address] CM to follow.

## 2023-09-07 LAB
ANION GAP SERPL CALCULATED.3IONS-SCNC: 4 MMOL/L
BUN SERPL-MCNC: 19 MG/DL (ref 5–25)
CALCIUM SERPL-MCNC: 8.6 MG/DL (ref 8.4–10.2)
CHLORIDE SERPL-SCNC: 93 MMOL/L (ref 96–108)
CO2 SERPL-SCNC: 39 MMOL/L (ref 21–32)
CREAT SERPL-MCNC: 0.49 MG/DL (ref 0.6–1.3)
ERYTHROCYTE [DISTWIDTH] IN BLOOD BY AUTOMATED COUNT: 23.9 % (ref 11.6–15.1)
GFR SERPL CREATININE-BSD FRML MDRD: 103 ML/MIN/1.73SQ M
GLUCOSE SERPL-MCNC: 229 MG/DL (ref 65–140)
GLUCOSE SERPL-MCNC: 234 MG/DL (ref 65–140)
GLUCOSE SERPL-MCNC: 303 MG/DL (ref 65–140)
GLUCOSE SERPL-MCNC: 362 MG/DL (ref 65–140)
GLUCOSE SERPL-MCNC: 386 MG/DL (ref 65–140)
HCT VFR BLD AUTO: 33.9 % (ref 34.8–46.1)
HGB BLD-MCNC: 9.5 G/DL (ref 11.5–15.4)
MCH RBC QN AUTO: 20.9 PG (ref 26.8–34.3)
MCHC RBC AUTO-ENTMCNC: 28 G/DL (ref 31.4–37.4)
MCV RBC AUTO: 75 FL (ref 82–98)
PLATELET # BLD AUTO: 281 THOUSANDS/UL (ref 149–390)
PMV BLD AUTO: 11.2 FL (ref 8.9–12.7)
POTASSIUM SERPL-SCNC: 3.8 MMOL/L (ref 3.5–5.3)
RBC # BLD AUTO: 4.55 MILLION/UL (ref 3.81–5.12)
SODIUM SERPL-SCNC: 136 MMOL/L (ref 135–147)
WBC # BLD AUTO: 12.81 THOUSAND/UL (ref 4.31–10.16)

## 2023-09-07 PROCEDURE — 80048 BASIC METABOLIC PNL TOTAL CA: CPT | Performed by: STUDENT IN AN ORGANIZED HEALTH CARE EDUCATION/TRAINING PROGRAM

## 2023-09-07 PROCEDURE — 99232 SBSQ HOSP IP/OBS MODERATE 35: CPT | Performed by: INTERNAL MEDICINE

## 2023-09-07 PROCEDURE — 94660 CPAP INITIATION&MGMT: CPT

## 2023-09-07 PROCEDURE — 94640 AIRWAY INHALATION TREATMENT: CPT

## 2023-09-07 PROCEDURE — 94760 N-INVAS EAR/PLS OXIMETRY 1: CPT

## 2023-09-07 PROCEDURE — 85027 COMPLETE CBC AUTOMATED: CPT | Performed by: STUDENT IN AN ORGANIZED HEALTH CARE EDUCATION/TRAINING PROGRAM

## 2023-09-07 PROCEDURE — 99232 SBSQ HOSP IP/OBS MODERATE 35: CPT | Performed by: STUDENT IN AN ORGANIZED HEALTH CARE EDUCATION/TRAINING PROGRAM

## 2023-09-07 PROCEDURE — 82948 REAGENT STRIP/BLOOD GLUCOSE: CPT

## 2023-09-07 PROCEDURE — 94003 VENT MGMT INPAT SUBQ DAY: CPT

## 2023-09-07 RX ORDER — INSULIN LISPRO 100 [IU]/ML
24 INJECTION, SOLUTION INTRAVENOUS; SUBCUTANEOUS
Status: DISCONTINUED | OUTPATIENT
Start: 2023-09-07 | End: 2023-09-08 | Stop reason: HOSPADM

## 2023-09-07 RX ADMIN — ESCITALOPRAM OXALATE 20 MG: 20 TABLET ORAL at 08:53

## 2023-09-07 RX ADMIN — APIXABAN 5 MG: 5 TABLET, FILM COATED ORAL at 08:53

## 2023-09-07 RX ADMIN — BUMETANIDE 4 MG: 0.25 INJECTION, SOLUTION INTRAMUSCULAR; INTRAVENOUS at 08:53

## 2023-09-07 RX ADMIN — INSULIN LISPRO 8 UNITS: 100 INJECTION, SOLUTION INTRAVENOUS; SUBCUTANEOUS at 12:08

## 2023-09-07 RX ADMIN — INSULIN LISPRO 10 UNITS: 100 INJECTION, SOLUTION INTRAVENOUS; SUBCUTANEOUS at 16:26

## 2023-09-07 RX ADMIN — FLUTICASONE PROPIONATE 1 SPRAY: 50 SPRAY, METERED NASAL at 08:52

## 2023-09-07 RX ADMIN — MONTELUKAST 10 MG: 10 TABLET, FILM COATED ORAL at 21:34

## 2023-09-07 RX ADMIN — INSULIN LISPRO 10 UNITS: 100 INJECTION, SOLUTION INTRAVENOUS; SUBCUTANEOUS at 21:34

## 2023-09-07 RX ADMIN — IPRATROPIUM BROMIDE 0.5 MG: 0.5 SOLUTION RESPIRATORY (INHALATION) at 19:36

## 2023-09-07 RX ADMIN — IPRATROPIUM BROMIDE 0.5 MG: 0.5 SOLUTION RESPIRATORY (INHALATION) at 07:51

## 2023-09-07 RX ADMIN — PANTOPRAZOLE SODIUM 40 MG: 40 TABLET, DELAYED RELEASE ORAL at 05:31

## 2023-09-07 RX ADMIN — INSULIN LISPRO 20 UNITS: 100 INJECTION, SOLUTION INTRAVENOUS; SUBCUTANEOUS at 12:08

## 2023-09-07 RX ADMIN — INSULIN LISPRO 24 UNITS: 100 INJECTION, SOLUTION INTRAVENOUS; SUBCUTANEOUS at 16:25

## 2023-09-07 RX ADMIN — METOPROLOL SUCCINATE 100 MG: 50 TABLET, EXTENDED RELEASE ORAL at 08:53

## 2023-09-07 RX ADMIN — INSULIN GLARGINE 40 UNITS: 100 INJECTION, SOLUTION SUBCUTANEOUS at 21:34

## 2023-09-07 RX ADMIN — APIXABAN 5 MG: 5 TABLET, FILM COATED ORAL at 18:17

## 2023-09-07 RX ADMIN — ATORVASTATIN CALCIUM 40 MG: 40 TABLET, FILM COATED ORAL at 08:53

## 2023-09-07 RX ADMIN — Medication 150 MG: at 09:12

## 2023-09-07 RX ADMIN — BUMETANIDE 4 MG: 0.25 INJECTION, SOLUTION INTRAMUSCULAR; INTRAVENOUS at 18:17

## 2023-09-07 RX ADMIN — METHYLPREDNISOLONE SODIUM SUCCINATE 40 MG: 40 INJECTION, POWDER, FOR SOLUTION INTRAMUSCULAR; INTRAVENOUS at 08:52

## 2023-09-07 RX ADMIN — LEVALBUTEROL HYDROCHLORIDE 1.25 MG: 1.25 SOLUTION RESPIRATORY (INHALATION) at 19:36

## 2023-09-07 RX ADMIN — INSULIN LISPRO 20 UNITS: 100 INJECTION, SOLUTION INTRAVENOUS; SUBCUTANEOUS at 08:54

## 2023-09-07 RX ADMIN — POTASSIUM CHLORIDE 40 MEQ: 1500 TABLET, EXTENDED RELEASE ORAL at 08:53

## 2023-09-07 RX ADMIN — ACETAMINOPHEN 650 MG: 325 TABLET, FILM COATED ORAL at 21:34

## 2023-09-07 RX ADMIN — ACETAMINOPHEN 650 MG: 325 TABLET, FILM COATED ORAL at 09:11

## 2023-09-07 RX ADMIN — INSULIN LISPRO 4 UNITS: 100 INJECTION, SOLUTION INTRAVENOUS; SUBCUTANEOUS at 08:57

## 2023-09-07 RX ADMIN — TRAZODONE HYDROCHLORIDE 150 MG: 150 TABLET ORAL at 21:35

## 2023-09-07 RX ADMIN — LEVALBUTEROL HYDROCHLORIDE 1.25 MG: 1.25 SOLUTION RESPIRATORY (INHALATION) at 07:51

## 2023-09-07 NOTE — PROGRESS NOTES
[facility]  Progress Note  Name: Christ Linda  MRN: [id number]  Unit/Bed#: -93 I Date of Admission: 9/5/2023   Date of Service: 9/7/2023 I Hospital Day: 2    Assessment/Plan   * Acute on chronic diastolic congestive heart failure (HCC)  Assessment & Plan  Wt Readings from Last 3 Encounters:   09/07/23 130 kg (286 lb 9.6 oz)   09/05/23 130 kg (287 lb 3.2 oz)   08/31/23 130 kg (287 lb)     · Patient was recently discharged from hospital on 8/22, since discharge noted to have 10 pound weight gain. · Chest x-ray concerning for vascular congestion. · She received IV Bumex in ED, continue same. · Monitor daily weights, ins and outs. · Despite patient being compliant on oral torsemide per cardiology recommendation since his last discharge patient presented with acute on chronic CHF, cardiology consulted. · Bumex increased to 4 mg twice daily, will consider adding metolazone pending 24-hour urine output. · Patient was strongly encouraged on lifestyle/diet compliance. COPD, severe ([address])  Assessment & Plan  · History noted, patient is on pembrolizumab, montelukast, Xopenex, Pulmicort and albuterol as needed at home. · Was evaluated by pulmonary in outpatient setting today, doubt COPD exacerbation. · Patient received Solu-Medrol in ED,  · Given no wheezing on exam, will titrate down Solu-Medrol to daily today and discontinue tomorrow. · Low threshold to DC steroids. · Continue Xopenex and Atrovent. Morbid obesity ([address])  Assessment & Plan  Counseled on lifestyle modification including regular exercise and diet changes. Chronic hypercapnic respiratory failure [facility])  Assessment & Plan  Patient is on 4 L nasal cannula at home, on presentation remains on home O2 requirements. Continue O2 supplements to maintain oxygen saturation above 88%.     Lactic acidosis  Assessment & Plan  · Lactic acid 2.5, per chart review on her last admission she also noted to have lactic acidosis. · Repeat LA trended up to 4.6, doubt infection versus D lactic acidosis  · Given patient is volume overloaded cannot give any IV fluids. · Lactic acid down to 2.6. Paroxysmal atrial fibrillation (HCC)  Assessment & Plan  · Rate controlled with metoprolol on Eliquis for anticoagulation. · Continue same. Type 2 diabetes mellitus with stage 2 chronic kidney disease, with long-term current use of insulin [facility])  Assessment & Plan  Lab Results   Component Value Date    HGBA1C 9.9 (H) 05/11/2023       Recent Labs     09/06/23 2024 09/06/23  2328 09/07/23  0754 09/07/23  1101   POCGLU 439* 324* 229* 303*       Blood Sugar Average: Last 72 hrs:  (P) 359.3   Outpatient regimen: Patient is on Lantus 30 units, Humalog 24 units with breakfast and 15 units with lunch and dinner, metformin and Ozempic. Inpatient regimen: Continue Lantus to 40 units, increase Premeal to 24 units, continue with sliding scale insulin. Started on hypoglycemia protocol. Continue Carb counting diet. Hypercholesterolemia  Assessment & Plan  Patient is on atorvastatin, continue same. Obstructive sleep apnea  Assessment & Plan  Uses CPAP at nighttime, continue same. Esophageal reflux  Assessment & Plan  Continue pantoprazole at home dosage. VTE Pharmacologic Prophylaxis: VTE Score: 7 Moderate Risk (Score 3-4) - Pharmacological DVT Prophylaxis Ordered: apixaban (Eliquis). Patient Centered Rounds: I performed bedside rounds with nursing staff today. Discussions with Specialists or Other Care Team Provider: CM    Education and Discussions with Family / Patient: Patient declined call to .      Total Time Spent on Date of Encounter in care of patient: 35 minutes This time was spent on one or more of the following: performing physical exam; counseling and coordination of care; obtaining or reviewing history; documenting in the medical record; reviewing/ordering tests, medications or procedures; communicating with other healthcare professionals and discussing with patient's family/caregivers. Current Length of Stay: 2 day(s)  Current Patient Status: Inpatient   Certification Statement: The patient will continue to require additional inpatient hospital stay due to Continues to be on IV Bumex. Discharge Plan: Anticipate discharge in 24-48 hrs to home with home services. Code Status: Level 1 - Full Code    Subjective:   Patient reports feeling better but still not back to baseline yet. I again had a long discussion regarding being compliant on diet changes. Objective:     Vitals:   Temp (24hrs), Av °F (36.7 °C), Min:97.5 °F (36.4 °C), Max:98.8 °F (37.1 °C)    Temp:  [97.5 °F (36.4 °C)-98.8 °F (37.1 °C)] 98.8 °F (37.1 °C)  HR:  [68-86] 85  Resp:  [16-20] 16  BP: ()/(50-73) 108/50  SpO2:  [91 %-100 %] 96 %  Body mass index is 50.77 kg/m². Input and Output Summary (last 24 hours): Intake/Output Summary (Last 24 hours) at 2023 1433  Last data filed at 2023 1330  Gross per 24 hour   Intake 100 ml   Output 4450 ml   Net -4350 ml       Physical Exam:   Physical Exam  Constitutional:       Appearance: Normal appearance. HENT:      Head: Normocephalic and atraumatic. Mouth/Throat:      Mouth: Mucous membranes are moist.      Pharynx: Oropharynx is clear. Eyes:      Conjunctiva/sclera: Conjunctivae normal.      Pupils: Pupils are equal, round, and reactive to light. Cardiovascular:      Rate and Rhythm: Normal rate and regular rhythm. Pulmonary:      Effort: Pulmonary effort is normal.      Breath sounds: No wheezing or rhonchi. Comments: Decreased air movement, slightly improved compared to yesterday, no wheezing. Abdominal:      General: Bowel sounds are normal.      Palpations: Abdomen is soft. Musculoskeletal:      Cervical back: Normal range of motion and neck supple. Right lower leg: Edema present. Left lower leg: Edema present.       Comments: Bilateral pedal edema improving. Skin:     General: Skin is warm and dry. Neurological:      General: No focal deficit present. Mental Status: She is alert and oriented to person, place, and time. Psychiatric:         Mood and Affect: Mood normal.         Behavior: Behavior normal.          Additional Data:     Labs:  Results from last 7 days   Lab Units 09/07/23  0525 09/06/23  0504   WBC Thousand/uL 12.81* 11.44*   HEMOGLOBIN g/dL 9.5* 10.0*   HEMATOCRIT % 33.9* 34.8   PLATELETS Thousands/uL 281 290   NEUTROS PCT %  --  90*   LYMPHS PCT %  --  7*   MONOS PCT %  --  2*   EOS PCT %  --  0     Results from last 7 days   Lab Units 09/07/23  0525 09/06/23  0504   SODIUM mmol/L 136 134*   POTASSIUM mmol/L 3.8 4.2   CHLORIDE mmol/L 93* 93*   CO2 mmol/L 39* 35*   BUN mg/dL 19 15   CREATININE mg/dL 0.49* 0.53*   ANION GAP mmol/L 4 6   CALCIUM mg/dL 8.6 8.9   ALBUMIN g/dL  --  4.1   TOTAL BILIRUBIN mg/dL  --  0.39   ALK PHOS U/L  --  140*   ALT U/L  --  26   AST U/L  --  18   GLUCOSE RANDOM mg/dL 234* 340*     Results from last 7 days   Lab Units 09/05/23  1514   INR  1.07     Results from last 7 days   Lab Units 09/07/23  1101 09/07/23  0754 09/06/23  2328 09/06/23  2024 09/06/23  1716 09/06/23  1120 09/06/23  0718 09/05/23  2351 09/05/23  2051 09/05/23  1818   POC GLUCOSE mg/dl 303* 229* 324* 439* 390* 389* 355* 443* 433* 288*         Results from last 7 days   Lab Units 09/06/23  0947 09/05/23  1853 09/05/23  1514   LACTIC ACID mmol/L 2.3* 4.6* 2.5*   PROCALCITONIN ng/ml  --   --  <0.05       Lines/Drains:  Invasive Devices     Peripheral Intravenous Line  Duration           Peripheral IV 09/05/23 Left;Proximal;Ventral (anterior) Forearm 1 day                      Imaging: No pertinent imaging reviewed. Recent Cultures (last 7 days):   Results from last 7 days   Lab Units 09/05/23  1514   BLOOD CULTURE  No Growth at 24 hrs. No Growth at 24 hrs.        Last 24 Hours Medication List:   Current Facility-Administered Medications   Medication Dose Route Frequency Provider Last Rate   • acetaminophen  650 mg Oral Q6H PRN Shirley Luis PA-C     • aluminum-magnesium hydroxide-simethicone  30 mL Oral Q6H PRN Vashti Kerns MD     • apixaban  5 mg Oral BID Vashti Kerns MD     • atorvastatin  40 mg Oral Daily Vashti Kerns MD     • bumetanide  4 mg Intravenous BID Fran John PA-C     • escitalopram  20 mg Oral Daily Vashti Kerns MD     • ferrous sulfate  150 mg Oral Daily Vashti Kerns MD     • fluticasone  1 spray Nasal BID Vashti Kerns MD     • insulin glargine  40 Units Subcutaneous HS Vashti Kerns MD     • insulin lispro  2-12 Units Subcutaneous TID AC Vashti Kerns MD     • insulin lispro  2-12 Units Subcutaneous HS Rosamaria Miller PA-C     • insulin lispro  24 Units Subcutaneous TID With Meals Vashti Kerns MD     • ipratropium  0.5 mg Nebulization BID Vashti Kerns MD     • levalbuterol  1.25 mg Nebulization BID Vashti Kerns MD     • LORazepam  0.5 mg Oral HS PRN Shirley Luis PA-C     • metoprolol succinate  100 mg Oral Daily Vashti Kerns MD     • montelukast  10 mg Oral HS Vashti Kerns MD     • ondansetron  4 mg Intravenous Q6H PRN Vashti Kerns MD     • pantoprazole  40 mg Oral Early Morning Vashti Kerns MD     • potassium chloride  40 mEq Oral Daily Vashti Kerns MD     • senna  1 tablet Oral HS PRN Vashti Kerns MD     • traZODone  150 mg Oral HS Vashti Kerns MD          Today, Patient Was Seen By: Vashti Kerns MD    **Please Note: This note may have been constructed using a voice recognition system. **

## 2023-09-07 NOTE — PLAN OF CARE
Problem: Potential for Falls  Goal: Patient will remain free of falls  Description: INTERVENTIONS:  - Educate patient/family on patient safety including physical limitations  - Instruct patient to call for assistance with activity   - Consult OT/PT to assist with strengthening/mobility   - Keep Call bell within reach  - Keep bed low and locked with side rails adjusted as appropriate  - Keep care items and personal belongings within reach  - Initiate and maintain comfort rounds  - Make Fall Risk Sign visible to staff  - Offer Toileting every 2 Hours, in advance of need  - Initiate/Maintain bed/chair alarm  - Obtain necessary fall risk management equipment: yellow bracelet/socks  - Apply yellow socks and bracelet for high fall risk patients  - Consider moving patient to room near nurses station  Outcome: Progressing     Problem: INFECTION - ADULT  Goal: Absence or prevention of progression during hospitalization  Description: INTERVENTIONS:  - Assess and monitor for signs and symptoms of infection  - Monitor lab/diagnostic results  - Monitor all insertion sites, i.e. indwelling lines, tubes, and drains  - Monitor endotracheal if appropriate and nasal secretions for changes in amount and color  - Grahn appropriate cooling/warming therapies per order  - Administer medications as ordered  - Instruct and encourage patient and family to use good hand hygiene technique  - Identify and instruct in appropriate isolation precautions for identified infection/condition  Outcome: Progressing     Problem: SAFETY ADULT  Goal: Patient will remain free of falls  Description: INTERVENTIONS:  - Educate patient/family on patient safety including physical limitations  - Instruct patient to call for assistance with activity   - Consult OT/PT to assist with strengthening/mobility   - Keep Call bell within reach  - Keep bed low and locked with side rails adjusted as appropriate  - Keep care items and personal belongings within reach  - Initiate and maintain comfort rounds  - Make Fall Risk Sign visible to staff  - Offer Toileting every 2 Hours, in advance of need  - Initiate/Maintain bed/chair alarm  - Obtain necessary fall risk management equipment: yellow bracelet/socks  - Apply yellow socks and bracelet for high fall risk patients  - Consider moving patient to room near nurses station  Outcome: Progressing     Problem: PAIN - ADULT  Goal: Verbalizes/displays adequate comfort level or baseline comfort level  Description: Interventions:  - Encourage patient to monitor pain and request assistance  - Assess pain using appropriate pain scale  - Administer analgesics based on type and severity of pain and evaluate response  - Implement non-pharmacological measures as appropriate and evaluate response  - Consider cultural and social influences on pain and pain management  - Notify physician/advanced practitioner if interventions unsuccessful or patient reports new pain  Outcome: Progressing     Problem: DISCHARGE PLANNING  Goal: Discharge to home or other facility with appropriate resources  Description: INTERVENTIONS:  - Identify barriers to discharge w/patient and caregiver  - Arrange for needed discharge resources and transportation as appropriate  - Identify discharge learning needs (meds, wound care, etc.)  - Arrange for interpretive services to assist at discharge as needed  - Refer to Case Management Department for coordinating discharge planning if the patient needs post-hospital services based on physician/advanced practitioner order or complex needs related to functional status, cognitive ability, or social support system  Outcome: Progressing     Problem: Knowledge Deficit  Goal: Patient/family/caregiver demonstrates understanding of disease process, treatment plan, medications, and discharge instructions  Description: Complete learning assessment and assess knowledge base.   Interventions:  - Provide teaching at level of understanding  - Provide teaching via preferred learning methods  Outcome: Progressing     Problem: RESPIRATORY - ADULT  Goal: Achieves optimal ventilation and oxygenation  Description: INTERVENTIONS:  - Assess for changes in respiratory status  - Assess for changes in mentation and behavior  - Position to facilitate oxygenation and minimize respiratory effort  - Oxygen administered by appropriate delivery if ordered  - Initiate smoking cessation education as indicated  - Encourage broncho-pulmonary hygiene including cough, deep breathe, Incentive Spirometry  - Assess the need for suctioning and aspirate as needed  - Assess and instruct to report SOB or any respiratory difficulty  - Respiratory Therapy support as indicated  Outcome: Progressing     Problem: CARDIOVASCULAR - ADULT  Goal: Maintains optimal cardiac output and hemodynamic stability  Description: INTERVENTIONS:  - Monitor I/O, vital signs and rhythm  - Monitor for S/S and trends of decreased cardiac output  - Administer and titrate ordered vasoactive medications to optimize hemodynamic stability  - Assess quality of pulses, skin color and temperature  - Assess for signs of decreased coronary artery perfusion  - Instruct patient to report change in severity of symptoms  Outcome: Progressing  Goal: Absence of cardiac dysrhythmias or at baseline rhythm  Description: INTERVENTIONS:  - Continuous cardiac monitoring, vital signs, obtain 12 lead EKG if ordered  - Administer antiarrhythmic and heart rate control medications as ordered  - Monitor electrolytes and administer replacement therapy as ordered  Outcome: Progressing     Problem: METABOLIC, FLUID AND ELECTROLYTES - ADULT  Goal: Electrolytes maintained within normal limits  Description: INTERVENTIONS:  - Monitor labs and assess patient for signs and symptoms of electrolyte imbalances  - Administer electrolyte replacement as ordered  - Monitor response to electrolyte replacements, including repeat lab results as appropriate  - Instruct patient on fluid and nutrition as appropriate  Outcome: Progressing  Goal: Fluid balance maintained  Description: INTERVENTIONS:  - Monitor labs   - Monitor I/O and WT  - Instruct patient on fluid and nutrition as appropriate  - Assess for signs & symptoms of volume excess or deficit  Outcome: Progressing  Goal: Glucose maintained within target range  Description: INTERVENTIONS:  - Monitor Blood Glucose as ordered  - Assess for signs and symptoms of hyperglycemia and hypoglycemia  - Administer ordered medications to maintain glucose within target range  - Assess nutritional intake and initiate nutrition service referral as needed  Outcome: Progressing     Problem: SKIN/TISSUE INTEGRITY - ADULT  Goal: Skin Integrity remains intact(Skin Breakdown Prevention)  Description: Assess:  -Perform Jt assessment every shift  -Clean and moisturize skin every shift, PRN  -Inspect skin when repositioning, toileting, and assisting with ADLS  -Assess extremities for adequate circulation and sensation     Bed Management:  -Have minimal linens on bed & keep smooth, unwrinkled  -Change linens as needed when moist or perspiring  -Avoid sitting or lying in one position for more than 2 hours while in bed  -Keep HOB at 45 degrees     Toileting:  -Offer bedside commode  -Assess for incontinence every 2  -Use incontinent care products after each incontinent episode such as emily pads, wipes, cream    Activity:  -Mobilize patient 3 times a day  -Encourage activity and walks on unit  -Encourage or provide ROM exercises   -Turn and reposition patient every 2 Hours  -Use appropriate equipment to lift or move patient in bed  -Instruct/ Assist with weight shifting every 2 when out of bed in chair  -Consider limitation of chair time 3 hour intervals    Skin Care:  -Avoid use of baby powder, tape, friction and shearing, hot water or constrictive clothing  -Relieve pressure over bony prominences using allevyn, pillows, wedges  -Do not massage red bony areas  Outcome: Progressing     Problem: HEMATOLOGIC - ADULT  Goal: Maintains hematologic stability  Description: INTERVENTIONS  - Assess for signs and symptoms of bleeding or hemorrhage  - Monitor labs  - Administer supportive blood products/factors as ordered and appropriate  Outcome: Progressing     Problem: MUSCULOSKELETAL - ADULT  Goal: Maintain or return mobility to safest level of function  Description: INTERVENTIONS:  - Assess patient's ability to carry out ADLs; assess patient's baseline for ADL function and identify physical deficits which impact ability to perform ADLs (bathing, care of mouth/teeth, toileting, grooming, dressing, etc.)  - Assess/evaluate cause of self-care deficits   - Assess range of motion  - Assess patient's mobility  - Assess patient's need for assistive devices and provide as appropriate  - Encourage maximum independence but intervene and supervise when necessary  - Involve family in performance of ADLs  - Assess for home care needs following discharge   - Consider OT consult to assist with ADL evaluation and planning for discharge  - Provide patient education as appropriate  Outcome: Progressing     Problem: MOBILITY - ADULT  Goal: Maintain or return to baseline ADL function  Description: INTERVENTIONS:  -  Assess patient's ability to carry out ADLs; assess patient's baseline for ADL function and identify physical deficits which impact ability to perform ADLs (bathing, care of mouth/teeth, toileting, grooming, dressing, etc.)  - Assess/evaluate cause of self-care deficits   - Assess range of motion  - Assess patient's mobility; develop plan if impaired  - Assess patient's need for assistive devices and provide as appropriate  - Encourage maximum independence but intervene and supervise when necessary  - Involve family in performance of ADLs  - Assess for home care needs following discharge   - Consider OT consult to assist with ADL evaluation and planning for discharge  - Provide patient education as appropriate  Outcome: Progressing  Goal: Maintains/Returns to pre admission functional level  Description: INTERVENTIONS:  - Perform BMAT or MOVE assessment daily.   - Set and communicate daily mobility goal to care team and patient/family/caregiver. - Collaborate with rehabilitation services on mobility goals if consulted  - Perform Range of Motion 3 times a day. - Reposition patient every 2 hours. - Dangle patient 3 times a day  - Stand patient 3 times a day  - Ambulate patient 3 times a day  - Out of bed to chair 3 times a day   - Out of bed for meals 3 times a day  - Out of bed for toileting  - Record patient progress and toleration of activity level   Outcome: Progressing     Problem: Prexisting or High Potential for Compromised Skin Integrity  Goal: Skin integrity is maintained or improved  Description: INTERVENTIONS:  - Identify patients at risk for skin breakdown  - Assess and monitor skin integrity  - Assess and monitor nutrition and hydration status  - Monitor labs   - Assess for incontinence   - Turn and reposition patient  - Assist with mobility/ambulation  - Relieve pressure over bony prominences  - Avoid friction and shearing  - Provide appropriate hygiene as needed including keeping skin clean and dry  - Evaluate need for skin moisturizer/barrier cream  - Collaborate with interdisciplinary team   - Patient/family teaching  - Consider wound care consult   Outcome: Progressing     Problem: Nutrition/Hydration-ADULT  Goal: Nutrient/Hydration intake appropriate for improving, restoring or maintaining nutritional needs  Description: Monitor and assess patient's nutrition/hydration status for malnutrition. Collaborate with interdisciplinary team and initiate plan and interventions as ordered. Monitor patient's weight and dietary intake as ordered or per policy. Utilize nutrition screening tool and intervene as necessary.  Determine patient's food preferences and provide high-protein, high-caloric foods as appropriate.      INTERVENTIONS:  - Monitor oral intake, urinary output, labs, and treatment plans  - Assess nutrition and hydration status and recommend course of action  - Evaluate amount of meals eaten  - Assist patient with eating if necessary   - Allow adequate time for meals  - Recommend/ encourage appropriate diets, oral nutritional supplements, and vitamin/mineral supplements  - Order, calculate, and assess calorie counts as needed  - Recommend, monitor, and adjust tube feedings and TPN/PPN based on assessed needs  - Assess need for intravenous fluids  - Provide specific nutrition/hydration education as appropriate  - Include patient/family/caregiver in decisions related to nutrition  Outcome: Progressing

## 2023-09-07 NOTE — ASSESSMENT & PLAN NOTE
Wt Readings from Last 3 Encounters:   09/07/23 130 kg (286 lb 9.6 oz)   09/05/23 130 kg (287 lb 3.2 oz)   08/31/23 130 kg (287 lb)     · Patient was recently discharged from hospital on 8/22, since discharge noted to have 10 pound weight gain. · Chest x-ray concerning for vascular congestion. · She received IV Bumex in ED, continue same. · Monitor daily weights, ins and outs. · Despite patient being compliant on oral torsemide per cardiology recommendation since his last discharge patient presented with acute on chronic CHF, cardiology consulted. · Bumex increased to 4 mg twice daily, will consider adding metolazone pending 24-hour urine output. · Patient was strongly encouraged on lifestyle/diet compliance.

## 2023-09-07 NOTE — ASSESSMENT & PLAN NOTE
· Lactic acid 2.5, per chart review on her last admission she also noted to have lactic acidosis. · Repeat LA trended up to 4.6, doubt infection versus D lactic acidosis  · Given patient is volume overloaded cannot give any IV fluids. · Lactic acid down to 2.6.

## 2023-09-07 NOTE — PROGRESS NOTES
Cardiology Progress Note   [place] 57 y.o. female MRN: [id number]    Unit/Bed#: -01 Encounter: [id number]      Assessment:  1. Chronic respiratory failure  a. Multifactorial  b. Baseline oxygen requirement 4L --stable at baseline currently   c. Follows with pulmonology as outpatient  2. Acute on chronic HFpEF, EF 65%  a. Secondary to dietary noncompliance  b. +11lb weight gain since prior admission, c/o increased shortness of breath beyond her baseline  c. 8/2023 Echo: LVEF 17%, grade 2 diastolic dysfunction, dilated RV with normal RV systolic function, dilated RA, severely elevated RV systolic pressure 61 mmHg  d. 9/2021 RHC: Severely elevated left and right-sided filling pressures, severe postcapillary pulmonary hypertension group 2, normal cardiac output  e. Wt previously down to 278 on admission August 2023  f. Current diuretic Torsemide 80 BID with potassium 40 AM/20 PM  3. Paroxysmal atrial fibrillation  a. Eliquis 5 twice daily for CVA risk reduction  b. AV blocking Rx: Toprol- daily  4. Type 2 diabetes  5. COPD  6. Obstructive sleep apnea     PLAN/ DISCUSSION:     • Patient diuresing very well on Bumex 4 mg twice daily (-3 LBS/-5.1L overnight). Volume status difficult to determine given her habitus however she is +8lbs above her baseline weight of 278lbs. Continue current dose today. Follow BMP trend, daily standing weights and UOP. Strict adherence to cardiac diet to include 1500 mL fluid restriction and 2g sodium allowance daily strongly recommended and advised to patient today. • Nutritional counseling placed to reinforce sodium/fluid restriction at home  • Cardiac meds as above. Subjective:   Patient seen and examined. Patients breathing better today. Denies chest pain or discomfort. Sitting in recliner eating Goldfish and has home brought salty snacks at bedside.      Objective:     Vitals: Blood pressure 128/53, pulse 68, temperature 98 °F (36.7 °C), temperature source Axillary, resp. rate 16, height 5' 3" (1.6 m), weight 130 kg (286 lb 9.6 oz), SpO2 100 %, not currently breastfeeding., Body mass index is 50.77 kg/m².,   Orthostatic Blood Pressures    Flowsheet Row Most Recent Value   Blood Pressure 128/53 filed at 09/07/2023 0800   Patient Position - Orthostatic VS Sitting filed at 09/07/2023 0800            Intake/Output Summary (Last 24 hours) at 9/7/2023 0926  Last data filed at 9/7/2023 0301  Gross per 24 hour   Intake --   Output 5275 ml   Net -5275 ml         Physical Exam:    GEN: Cal Sole appears well, alert and oriented x 3, pleasant and cooperative   HEENT: Sclera anicteric, conjunctivae pink, mucous membranes moist. Oropharynx clear. NECK: supple, no significant JVD, Trachea midline, no thyromegaly. HEART: regular rhythm, normal S1 and S2, no murmurs, clicks, gallops or rubs   LUNGS: clear to auscultation bilaterally; no wheezes, rales, or rhonchi. No increased work of breathing or signs of respiratory distress. ABDOMEN: Soft, nontender, nondistended  EXTREMITIES: Skin warm and well perfused, no clubbing, cyanosis  NEURO: No focal findings. Normal speech. Mood and affect normal.   SKIN: Normal without suspicious lesions on exposed skin.       Medications:      Current Facility-Administered Medications:   •  acetaminophen (TYLENOL) tablet 650 mg, 650 mg, Oral, Q6H PRN, Rosamaria Miller PA-C, 650 mg at 09/07/23 0911  •  aluminum-magnesium hydroxide-simethicone (MAALOX) oral suspension 30 mL, 30 mL, Oral, Q6H PRN, Myrna Wu MD  •  apixaban (ELIQUIS) tablet 5 mg, 5 mg, Oral, BID, Myrna Wu MD, 5 mg at 09/07/23 0853  •  atorvastatin (LIPITOR) tablet 40 mg, 40 mg, Oral, Daily, Myrna Wu MD, 40 mg at 09/07/23 0853  •  bumetanide (BUMEX) injection 4 mg, 4 mg, Intravenous, BID, Fran John PA-C, 4 mg at 09/07/23 0853  •  escitalopram (LEXAPRO) tablet 20 mg, 20 mg, Oral, Daily, Myrna MD Bryce, 20 mg at 09/07/23 0853  •  ferrous sulfate (MATILDA-IN-SOL) oral solution 150 mg, 150 mg, Oral, Daily, Oral MD Mayte, 150 mg at 09/07/23 0912  •  fluticasone (FLONASE) 50 mcg/act nasal spray 1 spray, 1 spray, Nasal, BID, Filemon Hu MD, 1 spray at 09/07/23 0852  •  insulin glargine (LANTUS) subcutaneous injection 40 Units 0.4 mL, 40 Units, Subcutaneous, HS, Oral MD Mayte, 40 Units at 09/06/23 2113  •  insulin lispro (HumaLOG) 100 units/mL subcutaneous injection 2-12 Units, 2-12 Units, Subcutaneous, TID AC, 4 Units at 09/07/23 0857 **AND** Fingerstick Glucose (POCT), , , TID AC, Filemon Hu MD  •  insulin lispro (HumaLOG) 100 units/mL subcutaneous injection 2-12 Units, 2-12 Units, Subcutaneous, HS, Rosamaria Miller PA-C, 12 Units at 09/06/23 2113  •  insulin lispro (HumaLOG) 100 units/mL subcutaneous injection 20 Units, 20 Units, Subcutaneous, TID With Meals, Filemon Hu MD, 20 Units at 09/07/23 0854  •  ipratropium (ATROVENT) 0.02 % inhalation solution 0.5 mg, 0.5 mg, Nebulization, BID, Filemon Hu MD, 0.5 mg at 09/07/23 0751  •  levalbuterol (XOPENEX) inhalation solution 1.25 mg, 1.25 mg, Nebulization, BID, Filemon Hu MD, 1.25 mg at 09/07/23 0751  •  LORazepam (ATIVAN) tablet 0.5 mg, 0.5 mg, Oral, HS PRN, Lorie Jacome PA-C, 0.5 mg at 09/06/23 2113  •  metoprolol succinate (TOPROL-XL) 24 hr tablet 100 mg, 100 mg, Oral, Daily, Filemon Hu MD, 100 mg at 09/07/23 0853  •  montelukast (SINGULAIR) tablet 10 mg, 10 mg, Oral, HS, Oral MD Mayte, 10 mg at 09/06/23 2113  •  ondansetron (ZOFRAN) injection 4 mg, 4 mg, Intravenous, Q6H PRN, Oral MD Mayte  •  pantoprazole (PROTONIX) EC tablet 40 mg, 40 mg, Oral, Early Morning, Oral MD Mayte, 40 mg at 09/07/23 0531  •  potassium chloride (K-DUR,KLOR-CON) CR tablet 40 mEq, 40 mEq, Oral, Daily, Oral MD Mayte, 40 mEq at 09/07/23 0853  •  senna (SENOKOT) tablet 8.6 mg, 1 tablet, Oral, HS PRN, Oral MD Mayte  •  traZODone (DESYREL) tablet 150 mg, 150 mg, Oral, HS, Oral MD Mayte, 150 mg at 09/06/23 3821     Labs & Results:        Results from last 7 days   Lab Units 09/07/23  0525 09/06/23  0504 09/05/23  1514   WBC Thousand/uL 12.81* 11.44* 10.78*   HEMOGLOBIN g/dL 9.5* 10.0* 10.8*   HEMATOCRIT % 33.9* 34.8 38.3   PLATELETS Thousands/uL 281 290 321         Results from last 7 days   Lab Units 09/07/23 0525 09/06/23  0504 09/05/23  1514 09/05/23  1511   POTASSIUM mmol/L 3.8 4.2 3.9  --    CHLORIDE mmol/L 93* 93* 94*  --    CO2 mmol/L 39* 35* 36*  --    CO2, I-STAT mmol/L  --   --   --  38*   BUN mg/dL 19 15 11  --    CREATININE mg/dL 0.49* 0.53* 0.57*  --    CALCIUM mg/dL 8.6 8.9 9.0  --    ALK PHOS U/L  --  140* 147*  --    ALT U/L  --  26 21  --    AST U/L  --  18 19  --    GLUCOSE, ISTAT mg/dl  --   --   --  273*     Results from last 7 days   Lab Units 09/05/23  1514   INR  1.07   PTT seconds 27     Results from last 7 days   Lab Units 09/06/23  0504 09/05/23  1514   MAGNESIUM mg/dL 2.3 2.0

## 2023-09-07 NOTE — ASSESSMENT & PLAN NOTE
Lab Results   Component Value Date    HGBA1C 9.9 (H) 05/11/2023       Recent Labs     09/06/23 2024 09/06/23  2328 09/07/23  0754 09/07/23  1101   POCGLU 439* 324* 229* 303*       Blood Sugar Average: Last 72 hrs:  (P) 359.3   Outpatient regimen: Patient is on Lantus 30 units, Humalog 24 units with breakfast and 15 units with lunch and dinner, metformin and Ozempic. Inpatient regimen: Continue Lantus to 40 units, increase Premeal to 24 units, continue with sliding scale insulin. Started on hypoglycemia protocol. Continue Carb counting diet.

## 2023-09-07 NOTE — CASE MANAGEMENT
Case Management Progress Note    Patient name Donovan Bangura  Location /-32 MRN [id number]  : 1960 Date 2023       LOS (days): 2  Geometric Mean LOS (GMLOS) (days):   Days to GMLOS:        OBJECTIVE:        Current admission status: Inpatient  Preferred Pharmacy:   [id number] [address] [address]  Phone: [phone] Fax: [phone]    [address] [place] [address]  Phone: [phone] Fax: [phone]    Primary Care Provider: Coy Goyal MD    Primary Insurance: [employer] [place] [employer]  Secondary Insurance:     PROGRESS NOTE:  Per Beau Trevino at [address] [place] [address], no copays for wheelchair. CM confirmed with [employer] that Pt is requesting wheelchair to be delivered to her home.

## 2023-09-07 NOTE — PLAN OF CARE
Problem: INFECTION - ADULT  Goal: Absence or prevention of progression during hospitalization  Description: INTERVENTIONS:  - Assess and monitor for signs and symptoms of infection  - Monitor lab/diagnostic results  - Monitor all insertion sites, i.e. indwelling lines, tubes, and drains  - Monitor endotracheal if appropriate and nasal secretions for changes in amount and color  - [place] appropriate cooling/warming therapies per order  - Administer medications as ordered  - Instruct and encourage patient and family to use good hand hygiene technique  - Identify and instruct in appropriate isolation precautions for identified infection/condition  Outcome: Progressing     Problem: PAIN - ADULT  Goal: Verbalizes/displays adequate comfort level or baseline comfort level  Description: Interventions:  - Encourage patient to monitor pain and request assistance  - Assess pain using appropriate pain scale  - Administer analgesics based on type and severity of pain and evaluate response  - Implement non-pharmacological measures as appropriate and evaluate response  - Consider cultural and social influences on pain and pain management  - Notify physician/advanced practitioner if interventions unsuccessful or patient reports new pain  Outcome: Progressing     Problem: DISCHARGE PLANNING  Goal: Discharge to home or other facility with appropriate resources  Description: INTERVENTIONS:  - Identify barriers to discharge w/patient and caregiver  - Arrange for needed discharge resources and transportation as appropriate  - Identify discharge learning needs (meds, wound care, etc.)  - Arrange for interpretive services to assist at discharge as needed  - Refer to Case Management Department for coordinating discharge planning if the patient needs post-hospital services based on physician/advanced practitioner order or complex needs related to functional status, cognitive ability, or social support system  Outcome: Progressing Problem: Knowledge Deficit  Goal: Patient/family/caregiver demonstrates understanding of disease process, treatment plan, medications, and discharge instructions  Description: Complete learning assessment and assess knowledge base.   Interventions:  - Provide teaching at level of understanding  - Provide teaching via preferred learning methods  Outcome: Progressing     Problem: RESPIRATORY - ADULT  Goal: Achieves optimal ventilation and oxygenation  Description: INTERVENTIONS:  - Assess for changes in respiratory status  - Assess for changes in mentation and behavior  - Position to facilitate oxygenation and minimize respiratory effort  - Oxygen administered by appropriate delivery if ordered  - Initiate smoking cessation education as indicated  - Encourage broncho-pulmonary hygiene including cough, deep breathe, Incentive Spirometry  - Assess the need for suctioning and aspirate as needed  - Assess and instruct to report SOB or any respiratory difficulty  - Respiratory Therapy support as indicated  Outcome: Progressing     Problem: CARDIOVASCULAR - ADULT  Goal: Maintains optimal cardiac output and hemodynamic stability  Description: INTERVENTIONS:  - Monitor I/O, vital signs and rhythm  - Monitor for S/S and trends of decreased cardiac output  - Administer and titrate ordered vasoactive medications to optimize hemodynamic stability  - Assess quality of pulses, skin color and temperature  - Assess for signs of decreased coronary artery perfusion  - Instruct patient to report change in severity of symptoms  Outcome: Progressing  Goal: Absence of cardiac dysrhythmias or at baseline rhythm  Description: INTERVENTIONS:  - Continuous cardiac monitoring, vital signs, obtain 12 lead EKG if ordered  - Administer antiarrhythmic and heart rate control medications as ordered  - Monitor electrolytes and administer replacement therapy as ordered  Outcome: Progressing     Problem: METABOLIC, FLUID AND ELECTROLYTES - ADULT  Goal: Electrolytes maintained within normal limits  Description: INTERVENTIONS:  - Monitor labs and assess patient for signs and symptoms of electrolyte imbalances  - Administer electrolyte replacement as ordered  - Monitor response to electrolyte replacements, including repeat lab results as appropriate  - Instruct patient on fluid and nutrition as appropriate  Outcome: Progressing  Goal: Fluid balance maintained  Description: INTERVENTIONS:  - Monitor labs   - Monitor I/O and WT  - Instruct patient on fluid and nutrition as appropriate  - Assess for signs & symptoms of volume excess or deficit  Outcome: Progressing  Goal: Glucose maintained within target range  Description: INTERVENTIONS:  - Monitor Blood Glucose as ordered  - Assess for signs and symptoms of hyperglycemia and hypoglycemia  - Administer ordered medications to maintain glucose within target range  - Assess nutritional intake and initiate nutrition service referral as needed  Outcome: Progressing     Problem: HEMATOLOGIC - ADULT  Goal: Maintains hematologic stability  Description: INTERVENTIONS  - Assess for signs and symptoms of bleeding or hemorrhage  - Monitor labs  - Administer supportive blood products/factors as ordered and appropriate  Outcome: Progressing     Problem: MUSCULOSKELETAL - ADULT  Goal: Maintain or return mobility to safest level of function  Description: INTERVENTIONS:  - Assess patient's ability to carry out ADLs; assess patient's baseline for ADL function and identify physical deficits which impact ability to perform ADLs (bathing, care of mouth/teeth, toileting, grooming, dressing, etc.)  - Assess/evaluate cause of self-care deficits   - Assess range of motion  - Assess patient's mobility  - Assess patient's need for assistive devices and provide as appropriate  - Encourage maximum independence but intervene and supervise when necessary  - Involve family in performance of ADLs  - Assess for home care needs following discharge   - Consider OT consult to assist with ADL evaluation and planning for discharge  - Provide patient education as appropriate  Outcome: Progressing     Problem: MOBILITY - ADULT  Goal: Maintain or return to baseline ADL function  Description: INTERVENTIONS:  -  Assess patient's ability to carry out ADLs; assess patient's baseline for ADL function and identify physical deficits which impact ability to perform ADLs (bathing, care of mouth/teeth, toileting, grooming, dressing, etc.)  - Assess/evaluate cause of self-care deficits   - Assess range of motion  - Assess patient's mobility; develop plan if impaired  - Assess patient's need for assistive devices and provide as appropriate  - Encourage maximum independence but intervene and supervise when necessary  - Involve family in performance of ADLs  - Assess for home care needs following discharge   - Consider OT consult to assist with ADL evaluation and planning for discharge  - Provide patient education as appropriate  Outcome: Progressing     Problem: Prexisting or High Potential for Compromised Skin Integrity  Goal: Skin integrity is maintained or improved  Description: INTERVENTIONS:  - Identify patients at risk for skin breakdown  - Assess and monitor skin integrity  - Assess and monitor nutrition and hydration status  - Monitor labs   - Assess for incontinence   - Turn and reposition patient  - Assist with mobility/ambulation  - Relieve pressure over bony prominences  - Avoid friction and shearing  - Provide appropriate hygiene as needed including keeping skin clean and dry  - Evaluate need for skin moisturizer/barrier cream  - Collaborate with interdisciplinary team   - Patient/family teaching  - Consider wound care consult   Outcome: Progressing     Problem: Nutrition/Hydration-ADULT  Goal: Nutrient/Hydration intake appropriate for improving, restoring or maintaining nutritional needs  Description: Monitor and assess patient's nutrition/hydration status for malnutrition. Collaborate with interdisciplinary team and initiate plan and interventions as ordered. Monitor patient's weight and dietary intake as ordered or per policy. Utilize nutrition screening tool and intervene as necessary. Determine patient's food preferences and provide high-protein, high-caloric foods as appropriate.      INTERVENTIONS:  - Monitor oral intake, urinary output, labs, and treatment plans  - Assess nutrition and hydration status and recommend course of action  - Evaluate amount of meals eaten  - Assist patient with eating if necessary   - Allow adequate time for meals  - Recommend/ encourage appropriate diets, oral nutritional supplements, and vitamin/mineral supplements  - Order, calculate, and assess calorie counts as needed  - Recommend, monitor, and adjust tube feedings and TPN/PPN based on assessed needs  - Assess need for intravenous fluids  - Provide specific nutrition/hydration education as appropriate  - Include patient/family/caregiver in decisions related to nutrition  Outcome: Progressing

## 2023-09-08 VITALS
OXYGEN SATURATION: 89 % | TEMPERATURE: 97.7 F | SYSTOLIC BLOOD PRESSURE: 118 MMHG | BODY MASS INDEX: 51.17 KG/M2 | DIASTOLIC BLOOD PRESSURE: 61 MMHG | WEIGHT: 288.8 LBS | RESPIRATION RATE: 16 BRPM | HEIGHT: 63 IN | HEART RATE: 75 BPM

## 2023-09-08 LAB
ANION GAP SERPL CALCULATED.3IONS-SCNC: 5 MMOL/L
ATRIAL RATE: 73 BPM
BUN SERPL-MCNC: 17 MG/DL (ref 5–25)
CALCIUM SERPL-MCNC: 8.1 MG/DL (ref 8.4–10.2)
CHLORIDE SERPL-SCNC: 93 MMOL/L (ref 96–108)
CO2 SERPL-SCNC: 40 MMOL/L (ref 21–32)
CREAT SERPL-MCNC: 0.47 MG/DL (ref 0.6–1.3)
DME PARACHUTE DELIVERY DATE EXPECTED: NORMAL
DME PARACHUTE DELIVERY DATE REQUESTED: NORMAL
DME PARACHUTE ITEM DESCRIPTION: NORMAL
DME PARACHUTE ORDER STATUS: NORMAL
DME PARACHUTE SUPPLIER NAME: NORMAL
DME PARACHUTE SUPPLIER PHONE: NORMAL
ERYTHROCYTE [DISTWIDTH] IN BLOOD BY AUTOMATED COUNT: 23.9 % (ref 11.6–15.1)
GFR SERPL CREATININE-BSD FRML MDRD: 104 ML/MIN/1.73SQ M
GLUCOSE SERPL-MCNC: 168 MG/DL (ref 65–140)
GLUCOSE SERPL-MCNC: 179 MG/DL (ref 65–140)
GLUCOSE SERPL-MCNC: 264 MG/DL (ref 65–140)
HCT VFR BLD AUTO: 33.7 % (ref 34.8–46.1)
HGB BLD-MCNC: 9.6 G/DL (ref 11.5–15.4)
MCH RBC QN AUTO: 21.2 PG (ref 26.8–34.3)
MCHC RBC AUTO-ENTMCNC: 28.5 G/DL (ref 31.4–37.4)
MCV RBC AUTO: 74 FL (ref 82–98)
P AXIS: 82 DEGREES
PLATELET # BLD AUTO: 295 THOUSANDS/UL (ref 149–390)
PMV BLD AUTO: 11.4 FL (ref 8.9–12.7)
POTASSIUM SERPL-SCNC: 3.6 MMOL/L (ref 3.5–5.3)
PR INTERVAL: 174 MS
QRS AXIS: 60 DEGREES
QRSD INTERVAL: 70 MS
QT INTERVAL: 392 MS
QTC INTERVAL: 431 MS
RBC # BLD AUTO: 4.53 MILLION/UL (ref 3.81–5.12)
SODIUM SERPL-SCNC: 138 MMOL/L (ref 135–147)
T WAVE AXIS: 79 DEGREES
VENTRICULAR RATE: 73 BPM
WBC # BLD AUTO: 12.22 THOUSAND/UL (ref 4.31–10.16)

## 2023-09-08 PROCEDURE — 94760 N-INVAS EAR/PLS OXIMETRY 1: CPT

## 2023-09-08 PROCEDURE — 94660 CPAP INITIATION&MGMT: CPT

## 2023-09-08 PROCEDURE — 94003 VENT MGMT INPAT SUBQ DAY: CPT

## 2023-09-08 PROCEDURE — 93010 ELECTROCARDIOGRAM REPORT: CPT | Performed by: INTERNAL MEDICINE

## 2023-09-08 PROCEDURE — 99232 SBSQ HOSP IP/OBS MODERATE 35: CPT | Performed by: INTERNAL MEDICINE

## 2023-09-08 PROCEDURE — 85027 COMPLETE CBC AUTOMATED: CPT | Performed by: STUDENT IN AN ORGANIZED HEALTH CARE EDUCATION/TRAINING PROGRAM

## 2023-09-08 PROCEDURE — 94640 AIRWAY INHALATION TREATMENT: CPT

## 2023-09-08 PROCEDURE — 82948 REAGENT STRIP/BLOOD GLUCOSE: CPT

## 2023-09-08 PROCEDURE — 80048 BASIC METABOLIC PNL TOTAL CA: CPT | Performed by: STUDENT IN AN ORGANIZED HEALTH CARE EDUCATION/TRAINING PROGRAM

## 2023-09-08 PROCEDURE — 99239 HOSP IP/OBS DSCHRG MGMT >30: CPT | Performed by: STUDENT IN AN ORGANIZED HEALTH CARE EDUCATION/TRAINING PROGRAM

## 2023-09-08 RX ORDER — BUMETANIDE 1 MG/1
4 TABLET ORAL 2 TIMES DAILY
Status: DISCONTINUED | OUTPATIENT
Start: 2023-09-08 | End: 2023-09-08 | Stop reason: HOSPADM

## 2023-09-08 RX ORDER — METOLAZONE 2.5 MG/1
5 TABLET ORAL DAILY
Status: CANCELLED | OUTPATIENT
Start: 2023-09-08

## 2023-09-08 RX ORDER — BUMETANIDE 2 MG/1
4 TABLET ORAL 2 TIMES DAILY
Qty: 120 TABLET | Refills: 0 | Status: SHIPPED | OUTPATIENT
Start: 2023-09-08 | End: 2023-10-08

## 2023-09-08 RX ADMIN — LEVALBUTEROL HYDROCHLORIDE 1.25 MG: 1.25 SOLUTION RESPIRATORY (INHALATION) at 07:15

## 2023-09-08 RX ADMIN — INSULIN LISPRO 2 UNITS: 100 INJECTION, SOLUTION INTRAVENOUS; SUBCUTANEOUS at 08:51

## 2023-09-08 RX ADMIN — PANTOPRAZOLE SODIUM 40 MG: 40 TABLET, DELAYED RELEASE ORAL at 05:57

## 2023-09-08 RX ADMIN — ESCITALOPRAM OXALATE 20 MG: 20 TABLET ORAL at 08:48

## 2023-09-08 RX ADMIN — IPRATROPIUM BROMIDE 0.5 MG: 0.5 SOLUTION RESPIRATORY (INHALATION) at 07:15

## 2023-09-08 RX ADMIN — INSULIN LISPRO 6 UNITS: 100 INJECTION, SOLUTION INTRAVENOUS; SUBCUTANEOUS at 12:22

## 2023-09-08 RX ADMIN — ATORVASTATIN CALCIUM 40 MG: 40 TABLET, FILM COATED ORAL at 08:48

## 2023-09-08 RX ADMIN — APIXABAN 5 MG: 5 TABLET, FILM COATED ORAL at 08:48

## 2023-09-08 RX ADMIN — POTASSIUM CHLORIDE 40 MEQ: 1500 TABLET, EXTENDED RELEASE ORAL at 08:48

## 2023-09-08 RX ADMIN — FLUTICASONE PROPIONATE 1 SPRAY: 50 SPRAY, METERED NASAL at 08:47

## 2023-09-08 RX ADMIN — Medication 150 MG: at 08:54

## 2023-09-08 RX ADMIN — BUMETANIDE 4 MG: 0.25 INJECTION, SOLUTION INTRAMUSCULAR; INTRAVENOUS at 08:52

## 2023-09-08 RX ADMIN — METOPROLOL SUCCINATE 100 MG: 50 TABLET, EXTENDED RELEASE ORAL at 08:48

## 2023-09-08 RX ADMIN — INSULIN LISPRO 24 UNITS: 100 INJECTION, SOLUTION INTRAVENOUS; SUBCUTANEOUS at 08:47

## 2023-09-08 RX ADMIN — INSULIN LISPRO 24 UNITS: 100 INJECTION, SOLUTION INTRAVENOUS; SUBCUTANEOUS at 12:22

## 2023-09-08 NOTE — ASSESSMENT & PLAN NOTE
Wt Readings from Last 3 Encounters:   09/08/23 131 kg (288 lb 12.8 oz)   09/05/23 130 kg (287 lb 3.2 oz)   08/31/23 130 kg (287 lb)     · Patient was recently discharged from hospital on 8/22, since discharge noted to have 10 pound weight gain. · Chest x-ray concerning for vascular congestion. · She received IV Bumex in ED, continue same. · Monitor daily weights, ins and outs. · Despite patient being compliant on oral torsemide per cardiology recommendation since his last discharge patient presented with acute on chronic CHF, cardiology consulted. · Was started on IV Bumex 4 mg twice daily that will be switched to oral on discharge. · Patient was strongly encouraged on lifestyle/diet compliance.

## 2023-09-08 NOTE — ASSESSMENT & PLAN NOTE
Lab Results   Component Value Date    HGBA1C 9.9 (H) 05/11/2023       Recent Labs     09/07/23  1101 09/07/23  1607 09/07/23 2013 09/08/23  0720   POCGLU 303* 362* 386* 168*       Blood Sugar Average: Last 72 hrs:  (P) [id number]   Outpatient regimen: Patient is on Lantus 30 units, Humalog 24 units with breakfast and 15 units with lunch and dinner, metformin and Ozempic. Inpatient regimen: Continue Lantus 40 units,  Premeal 24 units, continue with sliding scale insulin. Started on hypoglycemia protocol. Continue Carb counting diet. Resume home regimen on discharge as she has been off steroids and fingersticks have been improving.

## 2023-09-08 NOTE — PROGRESS NOTES
Cardiology Progress Note   Kirti Alcala 57 y.o. female MRN: [id number]    Unit/Bed#: -01 Encounter: [id number]      1. Chronic respiratory failure  a. Multifactorial  b. Baseline oxygen requirement 4L --stable at baseline currently   c. Follows with pulmonology as outpatient  2. Acute on chronic HFpEF, EF 65%  a. Secondary to dietary noncompliance  b. +11lb weight gain since prior admission, c/o increased shortness of breath beyond her baseline  c. 8/2023 Echo: LVEF 89%, grade 2 diastolic dysfunction, dilated RV with normal RV systolic function, dilated RA, severely elevated RV systolic pressure 61 mmHg  d. 9/2021 RHC: Severely elevated left and right-sided filling pressures, severe postcapillary pulmonary hypertension group 2, normal cardiac output  e. Wt previously down to 278 on admission August 2023  f. Current diuretic Torsemide 80 BID with potassium 40 AM/20 PM  3. Paroxysmal atrial fibrillation  a. Eliquis 5 twice daily for CVA risk reduction  b. AV blocking Rx: Toprol- daily  4. Type 2 diabetes  5. COPD  6. Obstructive sleep apnea compliant with CPAP use    OUP: -3.6 L overnight, -8.8 L since admission. PLAN/ DISCUSSION:     • Patient responded well to equivocal dose IV Bumex; she is feeling symptomatically improved and back to her baseline. Likely stable cardiac wise for discharge later today. • Switch IV to PO Bumex 4mg BID today (previously on torsemide 80 mg twice daily)  • Advised on maintaining daily standing weights, monitor salt/fluids throughout the day  • Spoke to nutritionist yesterday regarding healthier food choices   • We will arrange for close outpatient follow-up in 1 to 2 weeks post-discharge. • Continue home cardiac meds to include Toprol- mg, Eliquis 5 mg, atorvastatin 40 mg    Subjective:   Patient seen and examined. Overnight events reviewed.  Breathing feels back to baseline    Objective:     Vitals: Blood pressure 110/79, pulse 63, temperature 97.8 °F (36.6 °C), resp. rate 17, height 5' 3" (1.6 m), weight 131 kg (288 lb 12.8 oz), SpO2 100 %, not currently breastfeeding., Body mass index is 51.16 kg/m².,   Orthostatic Blood Pressures    Flowsheet Row Most Recent Value   Blood Pressure 110/79 filed at 09/08/2023 0721   Patient Position - Orthostatic VS Lying filed at 09/07/2023 1357          Intake/Output Summary (Last 24 hours) at 9/8/2023 0843  Last data filed at 9/8/2023 0558  Gross per 24 hour   Intake 580 ml   Output 4200 ml   Net -3620 ml     Physical Exam:  GEN: [place] Manual appears well, alert and oriented x 3, pleasant and cooperative   HEENT: Sclera anicteric, conjunctivae pink, mucous membranes moist. Oropharynx clear. NECK: supple, no significant JVD appreciated, Trachea midline, no thyromegaly. HEART: regular rhythm, no murmurs, clicks, gallops or rubs   LUNGS: Decreased breath sounds bilaterally bilaterally; no wheezes, rales, or rhonchi. No increased work of breathing or signs of respiratory distress. ABDOMEN: Soft, nontender, nondistended  EXTREMITIES: Skin warm and well perfused, no clubbing, cyanosis, or edema. NEURO: No focal findings. Normal speech. Mood and affect normal.   SKIN: Normal without suspicious lesions on exposed skin.       Medications:      Current Facility-Administered Medications:   •  acetaminophen (TYLENOL) tablet 650 mg, 650 mg, Oral, Q6H PRN, Ethan Haor PA-C, 650 mg at 09/07/23 2134  •  aluminum-magnesium hydroxide-simethicone (MAALOX) oral suspension 30 mL, 30 mL, Oral, Q6H PRN, Mery Francois MD  •  apixaban (ELIQUIS) tablet 5 mg, 5 mg, Oral, BID, Mery Francois MD, 5 mg at 09/07/23 1817  •  atorvastatin (LIPITOR) tablet 40 mg, 40 mg, Oral, Daily, Mery Francois MD, 40 mg at 09/07/23 0853  •  bumetanide (BUMEX) injection 4 mg, 4 mg, Intravenous, BID, Fran John PA-C, 4 mg at 09/07/23 1817  •  escitalopram (LEXAPRO) tablet 20 mg, 20 mg, Oral, Daily, Mery Francois MD, 20 mg at 09/07/23 0886  •  ferrous sulfate (MATILDA-IN-SOL) oral solution 150 mg, 150 mg, Oral, Daily, Osmin Salinas MD, 150 mg at 09/07/23 0912  •  fluticasone (FLONASE) 50 mcg/act nasal spray 1 spray, 1 spray, Nasal, BID, Osmin Salinas MD, 1 spray at 09/07/23 0852  •  insulin glargine (LANTUS) subcutaneous injection 40 Units 0.4 mL, 40 Units, Subcutaneous, HS, Osmin Salinas MD, 40 Units at 09/07/23 2134  •  insulin lispro (HumaLOG) 100 units/mL subcutaneous injection 2-12 Units, 2-12 Units, Subcutaneous, TID AC, 10 Units at 09/07/23 1626 **AND** Fingerstick Glucose (POCT), , , TID AC, Osmin Salinas MD  •  insulin lispro (HumaLOG) 100 units/mL subcutaneous injection 2-12 Units, 2-12 Units, Subcutaneous, HS, Rosamaria Miller PA-C, 10 Units at 09/07/23 2134  •  insulin lispro (HumaLOG) 100 units/mL subcutaneous injection 24 Units, 24 Units, Subcutaneous, TID With Meals, Osmin Salinas MD, 24 Units at 09/07/23 1625  •  ipratropium (ATROVENT) 0.02 % inhalation solution 0.5 mg, 0.5 mg, Nebulization, BID, Osmin Salinas MD, 0.5 mg at 09/08/23 0715  •  levalbuterol (XOPENEX) inhalation solution 1.25 mg, 1.25 mg, Nebulization, BID, Osmin Salinas MD, 1.25 mg at 09/08/23 0715  •  LORazepam (ATIVAN) tablet 0.5 mg, 0.5 mg, Oral, HS PRN, Clayton Cowart PA-C, 0.5 mg at 09/06/23 2113  •  metoprolol succinate (TOPROL-XL) 24 hr tablet 100 mg, 100 mg, Oral, Daily, Osmin Salinas MD, 100 mg at 09/07/23 0853  •  montelukast (SINGULAIR) tablet 10 mg, 10 mg, Oral, HS, Osmin Salinas MD, 10 mg at 09/07/23 2134  •  ondansetron (ZOFRAN) injection 4 mg, 4 mg, Intravenous, Q6H PRN, Osmin Salinas MD  •  pantoprazole (PROTONIX) EC tablet 40 mg, 40 mg, Oral, Early Morning, Osmin Salinas MD, 40 mg at 09/08/23 0557  •  potassium chloride (K-DUR,KLOR-CON) CR tablet 40 mEq, 40 mEq, Oral, Daily, Osmin Salinas MD, 40 mEq at 09/07/23 0853  •  senna (SENOKOT) tablet 8.6 mg, 1 tablet, Oral, HS PRN, Osmin Salinas MD  •  traZODone (DESYREL) tablet 150 mg, 150 mg, Oral, HS, Osmin Salinas MD, 150 mg at 09/07/23 2135     Labs & Results:        Results from last 7 days   Lab Units 09/08/23  0537 09/07/23  0525 09/06/23  0504   WBC Thousand/uL 12.22* 12.81* 11.44*   HEMOGLOBIN g/dL 9.6* 9.5* 10.0*   HEMATOCRIT % 33.7* 33.9* 34.8   PLATELETS Thousands/uL 295 281 290         Results from last 7 days   Lab Units 09/08/23  0537 09/07/23  0525 09/06/23  0504 09/05/23  1514 09/05/23  1514 09/05/23  1511   POTASSIUM mmol/L 3.6 3.8 4.2   < > 3.9  --    CHLORIDE mmol/L 93* 93* 93*   < > 94*  --    CO2 mmol/L 40* 39* 35*   < > 36*  --    CO2, I-STAT mmol/L  --   --   --   --   --  38*   BUN mg/dL 17 19 15   < > 11  --    CREATININE mg/dL 0.47* 0.49* 0.53*   < > 0.57*  --    CALCIUM mg/dL 8.1* 8.6 8.9   < > 9.0  --    ALK PHOS U/L  --   --  140*  --  147*  --    ALT U/L  --   --  26  --  21  --    AST U/L  --   --  18  --  19  --    GLUCOSE, ISTAT mg/dl  --   --   --   --   --  273*    < > = values in this interval not displayed.      Results from last 7 days   Lab Units 09/05/23  1514   INR  1.07   PTT seconds 27     Results from last 7 days   Lab Units 09/06/23  0504 09/05/23  1514   MAGNESIUM mg/dL 2.3 2.0

## 2023-09-08 NOTE — DISCHARGE SUMMARY
[facility]  Discharge- Heavenly Kaur 1960, 57 y.o. female MRN: [id number]  Unit/Bed#: -Joe Encounter: [id number]  Primary Care Provider: Ventura Nelson MD   Date and time admitted to hospital: 9/5/2023  2:40 PM    * Acute on chronic diastolic congestive heart failure ([address])  Assessment & Plan  Wt Readings from Last 3 Encounters:   09/08/23 131 kg (288 lb 12.8 oz)   09/05/23 130 kg (287 lb 3.2 oz)   08/31/23 130 kg (287 lb)     · Patient was recently discharged from hospital on 8/22, since discharge noted to have 10 pound weight gain. · Chest x-ray concerning for vascular congestion. · She received IV Bumex in ED, continue same. · Monitor daily weights, ins and outs. · Despite patient being compliant on oral torsemide per cardiology recommendation since his last discharge patient presented with acute on chronic CHF, cardiology consulted. · Was started on IV Bumex 4 mg twice daily that will be switched to oral on discharge. · Patient was strongly encouraged on lifestyle/diet compliance. COPD, severe ([address])  Assessment & Plan  · History noted, patient is on pembrolizumab, montelukast, Xopenex, Pulmicort and albuterol as needed at home. · Was evaluated by pulmonary in outpatient setting today, doubt COPD exacerbation. · Patient received Solu-Medrol in ED,  · Patient received Solu-Medrol for 3 days, DC'd on 9/8. · Continue Xopenex and Atrovent. Morbid obesity ([address])  Assessment & Plan  Counseled on lifestyle modification including regular exercise and diet changes. Chronic hypercapnic respiratory failure [facility])  Assessment & Plan  Patient is on 4 L nasal cannula at home, on presentation remains on home O2 requirements. Continue O2 supplements to maintain oxygen saturation above 88%. Lactic acidosis  Assessment & Plan  · Lactic acid 2.5, per chart review on her last admission she also noted to have lactic acidosis.   · Repeat LA trended up to 4.6, doubt infection versus D lactic acidosis  · Given patient is volume overloaded cannot give any IV fluids. · Lactic acid down to 2.6. Paroxysmal atrial fibrillation (HCC)  Assessment & Plan  · Rate controlled with metoprolol on Eliquis for anticoagulation. · Continue same. Type 2 diabetes mellitus with stage 2 chronic kidney disease, with long-term current use of insulin [facility])  Assessment & Plan  Lab Results   Component Value Date    HGBA1C 9.9 (H) 05/11/2023       Recent Labs     09/07/23  1101 09/07/23  1607 09/07/23 2013 09/08/23  0720   POCGLU 303* 362* 386* 168*       Blood Sugar Average: Last 72 hrs:  (P) [id number]   Outpatient regimen: Patient is on Lantus 30 units, Humalog 24 units with breakfast and 15 units with lunch and dinner, metformin and Ozempic. Inpatient regimen: Continue Lantus 40 units,  Premeal 24 units, continue with sliding scale insulin. Started on hypoglycemia protocol. Continue Carb counting diet. Resume home regimen on discharge as she has been off steroids and fingersticks have been improving. Hypercholesterolemia  Assessment & Plan  Patient is on atorvastatin, continue same. Obstructive sleep apnea  Assessment & Plan  Uses CPAP at nighttime, continue same. Esophageal reflux  Assessment & Plan  Continue pantoprazole at home dosage. Medical Problems     Resolved Problems  Date Reviewed: 9/8/2023   None       Discharging Physician / Practitioner: Mary Navarrete MD  PCP: Roro Alvarado MD  Admission Date:   Admission Orders (From admission, onward)     Ordered        09/05/23 0455  [address]  Once                      Discharge Date: 09/08/23    Consultations During Hospital Stay:  · Cardiology. Procedures Performed:     Significant Findings / Test Results:   CXR:   IMPRESSION:     No acute cardiopulmonary disease.       Incidental Findings:   ·     Test Results Pending at Discharge (will require follow up):   ·      Outpatient Tests Requested:  ·     Complications:  none    Reason for Admission: CHF exacerbation. Hospital Course:   Esther Gallagher is a 57 y.o. female patient who originally presented to the hospital on 9/5/2023 due to CHF exacerbation. Patient was recently discharged with increased dose of torsemide but presented with shortness of breath. Chest x-ray concerning for vascular congestion, she was started on IV Bumex responded to treatment very well, evaluated by cardiology, Bumex was switched to oral today. Patient had been compliant with medication but noncompliant with her diet changes, I have emphasized during my each encounter in morning rounds with patient to be compliant on diet, she is agreeable for same. During her stay her Lantus and premeals were increased as she was started on steroids however being off steroids her sugars have been improving hence she will be discharged on her home dosage. She needs to follow-up with PCP, cardiology and endocrinology. Please see above list of diagnoses and related plan for additional information. Condition at Discharge: stable    Discharge Day Visit / Exam:   Subjective: Patient reports feeling back to her baseline today. Denies any shortness of breath. Vitals: Blood Pressure: 118/61 (09/08/23 1537)  Pulse: 75 (09/08/23 1537)  Temperature: 97.7 °F (36.5 °C) (09/08/23 1537)  Temp Source: Oral (09/08/23 0848)  Respirations: 16 (09/08/23 1537)  Height: 5' 3" (160 cm) (09/05/23 1734)  Weight - Scale: 131 kg (288 lb 12.8 oz) (09/08/23 0537)  SpO2: (!) 89 % (09/08/23 1537)  Exam:   Physical Exam  Constitutional:       Appearance: Normal appearance. HENT:      Head: Normocephalic and atraumatic. Mouth/Throat:      Mouth: Mucous membranes are moist.      Pharynx: Oropharynx is clear. Eyes:      Conjunctiva/sclera: Conjunctivae normal.      Pupils: Pupils are equal, round, and reactive to light. Cardiovascular:      Rate and Rhythm: Normal rate and regular rhythm. Pulses: Normal pulses. Heart sounds: Normal heart sounds. Pulmonary:      Effort: Pulmonary effort is normal.      Comments: Decreased breath sounds bilaterally due to body habitus, no wheezing or rhonchi. Abdominal:      General: Bowel sounds are normal.      Palpations: Abdomen is soft. Musculoskeletal:         General: Normal range of motion. Skin:     General: Skin is warm and dry. Neurological:      General: No focal deficit present. Mental Status: She is alert and oriented to person, place, and time. Discussion with Family: Patient declined call to . Discharge instructions/Information to patient and family:   See after visit summary for information provided to patient and family. Provisions for Follow-Up Care:  See after visit summary for information related to follow-up care and any pertinent home health orders. Disposition:   Home with [facility] Services (Reminder: Complete face to face encounter)    Planned Readmission: no     Discharge Statement:  I spent 38 minutes discharging the patient. This time was spent on the day of discharge. I had direct contact with the patient on the day of discharge. Greater than 50% of the total time was spent examining patient, answering all patient questions, arranging and discussing plan of care with patient as well as directly providing post-discharge instructions. Additional time then spent on discharge activities. Discharge Medications:  See after visit summary for reconciled discharge medications provided to patient and/or family.       **Please Note: This note may have been constructed using a voice recognition system**

## 2023-09-08 NOTE — ASSESSMENT & PLAN NOTE
· History noted, patient is on pembrolizumab, montelukast, Xopenex, Pulmicort and albuterol as needed at home. · Was evaluated by pulmonary in outpatient setting today, doubt COPD exacerbation. · Patient received Solu-Medrol in ED,  · Patient received Solu-Medrol for 3 days, DC'd on 9/8. · Continue Xopenex and Atrovent.

## 2023-09-08 NOTE — PLAN OF CARE
Problem: Potential for Falls  Goal: Patient will remain free of falls  Description: INTERVENTIONS:  - Educate patient/family on patient safety including physical limitations  - Instruct patient to call for assistance with activity   - Consult OT/PT to assist with strengthening/mobility   - Keep Call bell within reach  - Keep bed low and locked with side rails adjusted as appropriate  - Keep care items and personal belongings within reach  - Initiate and maintain comfort rounds  - Make Fall Risk Sign visible to staff  - Offer Toileting every 2 Hours, in advance of need  - Initiate/Maintain bed alarm  - Obtain necessary fall risk management equipment:   - Apply yellow socks and bracelet for high fall risk patients  - Consider moving patient to room near nurses station  Outcome: Progressing     Problem: INFECTION - ADULT  Goal: Absence or prevention of progression during hospitalization  Description: INTERVENTIONS:  - Assess and monitor for signs and symptoms of infection  - Monitor lab/diagnostic results  - Monitor all insertion sites, i.e. indwelling lines, tubes, and drains  - Monitor endotracheal if appropriate and nasal secretions for changes in amount and color  - [place] appropriate cooling/warming therapies per order  - Administer medications as ordered  - Instruct and encourage patient and family to use good hand hygiene technique  - Identify and instruct in appropriate isolation precautions for identified infection/condition  Outcome: Progressing     Problem: SAFETY ADULT  Goal: Patient will remain free of falls  Description: INTERVENTIONS:  - Educate patient/family on patient safety including physical limitations  - Instruct patient to call for assistance with activity   - Consult OT/PT to assist with strengthening/mobility   - Keep Call bell within reach  - Keep bed low and locked with side rails adjusted as appropriate  - Keep care items and personal belongings within reach  - Initiate and maintain comfort rounds  - Make Fall Risk Sign visible to staff  - Offer Toileting every 2 Hours, in advance of need  - Initiate/Maintain bed alarm  - Obtain necessary fall risk management equipment:   - Apply yellow socks and bracelet for high fall risk patients  - Consider moving patient to room near nurses station  Outcome: Progressing     Problem: PAIN - ADULT  Goal: Verbalizes/displays adequate comfort level or baseline comfort level  Description: Interventions:  - Encourage patient to monitor pain and request assistance  - Assess pain using appropriate pain scale  - Administer analgesics based on type and severity of pain and evaluate response  - Implement non-pharmacological measures as appropriate and evaluate response  - Consider cultural and social influences on pain and pain management  - Notify physician/advanced practitioner if interventions unsuccessful or patient reports new pain  Outcome: Progressing     Problem: DISCHARGE PLANNING  Goal: Discharge to home or other facility with appropriate resources  Description: INTERVENTIONS:  - Identify barriers to discharge w/patient and caregiver  - Arrange for needed discharge resources and transportation as appropriate  - Identify discharge learning needs (meds, wound care, etc.)  - Arrange for interpretive services to assist at discharge as needed  - Refer to Case Management Department for coordinating discharge planning if the patient needs post-hospital services based on physician/advanced practitioner order or complex needs related to functional status, cognitive ability, or social support system  Outcome: Progressing     Problem: Knowledge Deficit  Goal: Patient/family/caregiver demonstrates understanding of disease process, treatment plan, medications, and discharge instructions  Description: Complete learning assessment and assess knowledge base.   Interventions:  - Provide teaching at level of understanding  - Provide teaching via preferred learning methods  Outcome: Progressing     Problem: RESPIRATORY - ADULT  Goal: Achieves optimal ventilation and oxygenation  Description: INTERVENTIONS:  - Assess for changes in respiratory status  - Assess for changes in mentation and behavior  - Position to facilitate oxygenation and minimize respiratory effort  - Oxygen administered by appropriate delivery if ordered  - Initiate smoking cessation education as indicated  - Encourage broncho-pulmonary hygiene including cough, deep breathe, Incentive Spirometry  - Assess the need for suctioning and aspirate as needed  - Assess and instruct to report SOB or any respiratory difficulty  - Respiratory Therapy support as indicated  Outcome: Progressing     Problem: CARDIOVASCULAR - ADULT  Goal: Maintains optimal cardiac output and hemodynamic stability  Description: INTERVENTIONS:  - Monitor I/O, vital signs and rhythm  - Monitor for S/S and trends of decreased cardiac output  - Administer and titrate ordered vasoactive medications to optimize hemodynamic stability  - Assess quality of pulses, skin color and temperature  - Assess for signs of decreased coronary artery perfusion  - Instruct patient to report change in severity of symptoms  Outcome: Progressing  Goal: Absence of cardiac dysrhythmias or at baseline rhythm  Description: INTERVENTIONS:  - Continuous cardiac monitoring, vital signs, obtain 12 lead EKG if ordered  - Administer antiarrhythmic and heart rate control medications as ordered  - Monitor electrolytes and administer replacement therapy as ordered  Outcome: Progressing     Problem: METABOLIC, FLUID AND ELECTROLYTES - ADULT  Goal: Electrolytes maintained within normal limits  Description: INTERVENTIONS:  - Monitor labs and assess patient for signs and symptoms of electrolyte imbalances  - Administer electrolyte replacement as ordered  - Monitor response to electrolyte replacements, including repeat lab results as appropriate  - Instruct patient on fluid and nutrition as appropriate  Outcome: Progressing  Goal: Fluid balance maintained  Description: INTERVENTIONS:  - Monitor labs   - Monitor I/O and WT  - Instruct patient on fluid and nutrition as appropriate  - Assess for signs & symptoms of volume excess or deficit  Outcome: Progressing  Goal: Glucose maintained within target range  Description: INTERVENTIONS:  - Monitor Blood Glucose as ordered  - Assess for signs and symptoms of hyperglycemia and hypoglycemia  - Administer ordered medications to maintain glucose within target range  - Assess nutritional intake and initiate nutrition service referral as needed  Outcome: Progressing     Problem: SKIN/TISSUE INTEGRITY - ADULT  Goal: Skin Integrity remains intact(Skin Breakdown Prevention)  Description: Assess:  -Perform Jt assessment every shift  -Clean and moisturize skin every shift  -Inspect skin when repositioning, toileting, and assisting with ADLS  -Assess extremities for adequate circulation and sensation     Bed Management:  -Have minimal linens on bed & keep smooth, unwrinkled  -Change linens as needed when moist or perspiring  -Avoid sitting or lying in one position for more than 2 hours while in bed  -Keep HOB at 30 degrees     Toileting:  -Offer bedside commode  -Assess for incontinence every hr  -Use incontinent care products after each incontinent episode such as powders/ creams    Activity:  -Mobilize patient 3 times a day  -Encourage activity and walks on unit  -Encourage or provide ROM exercises   -Turn and reposition patient every 2 Hours  -Use appropriate equipment to lift or move patient in bed  -Instruct/ Assist with weight shifting every hr when out of bed in chair  -Consider limitation of chair time 2 hour intervals    Skin Care:  -Avoid use of baby powder, tape, friction and shearing, hot water or constrictive clothing  -Relieve pressure over bony prominences using weight shift  -Do not massage red bony areas    Outcome: Progressing Problem: HEMATOLOGIC - ADULT  Goal: Maintains hematologic stability  Description: INTERVENTIONS  - Assess for signs and symptoms of bleeding or hemorrhage  - Monitor labs  - Administer supportive blood products/factors as ordered and appropriate  Outcome: Progressing     Problem: MUSCULOSKELETAL - ADULT  Goal: Maintain or return mobility to safest level of function  Description: INTERVENTIONS:  - Assess patient's ability to carry out ADLs; assess patient's baseline for ADL function and identify physical deficits which impact ability to perform ADLs (bathing, care of mouth/teeth, toileting, grooming, dressing, etc.)  - Assess/evaluate cause of self-care deficits   - Assess range of motion  - Assess patient's mobility  - Assess patient's need for assistive devices and provide as appropriate  - Encourage maximum independence but intervene and supervise when necessary  - Involve family in performance of ADLs  - Assess for home care needs following discharge   - Consider OT consult to assist with ADL evaluation and planning for discharge  - Provide patient education as appropriate  Outcome: Progressing     Problem: MOBILITY - ADULT  Goal: Maintain or return to baseline ADL function  Description: INTERVENTIONS:  -  Assess patient's ability to carry out ADLs; assess patient's baseline for ADL function and identify physical deficits which impact ability to perform ADLs (bathing, care of mouth/teeth, toileting, grooming, dressing, etc.)  - Assess/evaluate cause of self-care deficits   - Assess range of motion  - Assess patient's mobility; develop plan if impaired  - Assess patient's need for assistive devices and provide as appropriate  - Encourage maximum independence but intervene and supervise when necessary  - Involve family in performance of ADLs  - Assess for home care needs following discharge   - Consider OT consult to assist with ADL evaluation and planning for discharge  - Provide patient education as appropriate  Outcome: Progressing  Goal: Maintains/Returns to pre admission functional level  Description: INTERVENTIONS:  - Perform BMAT or MOVE assessment daily.   - Set and communicate daily mobility goal to care team and patient/family/caregiver. - Collaborate with rehabilitation services on mobility goals if consulted  - Perform Range of Motion 3 times a day. - Reposition patient every 2 hours. - Dangle patient 3 times a day  - Stand patient 3 times a day  - Ambulate patient 3 times a day  - Out of bed to chair 3 times a day   - Out of bed for meals 3 times a day  - Out of bed for toileting  - Record patient progress and toleration of activity level   Outcome: Progressing     Problem: Prexisting or High Potential for Compromised Skin Integrity  Goal: Skin integrity is maintained or improved  Description: INTERVENTIONS:  - Identify patients at risk for skin breakdown  - Assess and monitor skin integrity  - Assess and monitor nutrition and hydration status  - Monitor labs   - Assess for incontinence   - Turn and reposition patient  - Assist with mobility/ambulation  - Relieve pressure over bony prominences  - Avoid friction and shearing  - Provide appropriate hygiene as needed including keeping skin clean and dry  - Evaluate need for skin moisturizer/barrier cream  - Collaborate with interdisciplinary team   - Patient/family teaching  - Consider wound care consult   Outcome: Progressing     Problem: Nutrition/Hydration-ADULT  Goal: Nutrient/Hydration intake appropriate for improving, restoring or maintaining nutritional needs  Description: Monitor and assess patient's nutrition/hydration status for malnutrition. Collaborate with interdisciplinary team and initiate plan and interventions as ordered. Monitor patient's weight and dietary intake as ordered or per policy. Utilize nutrition screening tool and intervene as necessary.  Determine patient's food preferences and provide high-protein, high-caloric foods as appropriate.      INTERVENTIONS:  - Monitor oral intake, urinary output, labs, and treatment plans  - Assess nutrition and hydration status and recommend course of action  - Evaluate amount of meals eaten  - Assist patient with eating if necessary   - Allow adequate time for meals  - Recommend/ encourage appropriate diets, oral nutritional supplements, and vitamin/mineral supplements  - Order, calculate, and assess calorie counts as needed  - Recommend, monitor, and adjust tube feedings and TPN/PPN based on assessed needs  - Assess need for intravenous fluids  - Provide specific nutrition/hydration education as appropriate  - Include patient/family/caregiver in decisions related to nutrition  Outcome: Progressing

## 2023-09-08 NOTE — CASE MANAGEMENT
Case Management Progress Note    Patient name Heavenly Kaur  Location /-12 MRN [id number]  : 1960 Date 2023       LOS (days): 3  Geometric Mean LOS (GMLOS) (days):   Days to GMLOS:        OBJECTIVE:     Current admission status: Inpatient  Preferred Pharmacy:   [id number] Navarrete - [address] [address]  Phone: [phone] Fax: [phone]    [facility] [place] [id number]  Phone: [phone] Fax: [phone]    Primary Care Provider: Ventura Nelson MD    Primary Insurance: [place] [employer]  Secondary Insurance:     PROGRESS NOTE:    Pt made aware that Magali Keyur [place] approved her wc and it will be delivered on .

## 2023-09-08 NOTE — NURSING NOTE
Pt cleared for discharge home with [facility] at this time. D/C instructions reviewed with and given to pt. Script sent to preferred pharmacy. All questions and concerns addressed at this time. IV site and Masimo removed. Belongings gathered by pt. Pt escorted to car in wheelchair by PCA for transport home by family.

## 2023-09-08 NOTE — PLAN OF CARE
Problem: Potential for Falls  Goal: Patient will remain free of falls  Description: INTERVENTIONS:  - Educate patient/family on patient safety including physical limitations  - Instruct patient to call for assistance with activity   - Consult OT/PT to assist with strengthening/mobility   - Keep Call bell within reach  - Keep bed low and locked with side rails adjusted as appropriate  - Keep care items and personal belongings within reach  - Initiate and maintain comfort rounds  - Make Fall Risk Sign visible to staff  - Offer Toileting every 2 Hours, in advance of need  - Initiate/Maintain alarm  - Obtain necessary fall risk management equipment:   - Apply yellow socks and bracelet for high fall risk patients  - Consider moving patient to room near nurses station  Outcome: Progressing     Problem: INFECTION - ADULT  Goal: Absence or prevention of progression during hospitalization  Description: INTERVENTIONS:  - Assess and monitor for signs and symptoms of infection  - Monitor lab/diagnostic results  - Monitor all insertion sites, i.e. indwelling lines, tubes, and drains  - Monitor endotracheal if appropriate and nasal secretions for changes in amount and color  - [place] appropriate cooling/warming therapies per order  - Administer medications as ordered  - Instruct and encourage patient and family to use good hand hygiene technique  - Identify and instruct in appropriate isolation precautions for identified infection/condition  Outcome: Progressing     Problem: SAFETY ADULT  Goal: Patient will remain free of falls  Description: INTERVENTIONS:  - Educate patient/family on patient safety including physical limitations  - Instruct patient to call for assistance with activity   - Consult OT/PT to assist with strengthening/mobility   - Keep Call bell within reach  - Keep bed low and locked with side rails adjusted as appropriate  - Keep care items and personal belongings within reach  - Initiate and maintain comfort rounds  - Make Fall Risk Sign visible to staff  - Offer Toileting every 2 Hours, in advance of need  - Initiate/Maintain alarm  - Obtain necessary fall risk management equipment:   - Apply yellow socks and bracelet for high fall risk patients  - Consider moving patient to room near nurses station  Outcome: Progressing     Problem: PAIN - ADULT  Goal: Verbalizes/displays adequate comfort level or baseline comfort level  Description: Interventions:  - Encourage patient to monitor pain and request assistance  - Assess pain using appropriate pain scale  - Administer analgesics based on type and severity of pain and evaluate response  - Implement non-pharmacological measures as appropriate and evaluate response  - Consider cultural and social influences on pain and pain management  - Notify physician/advanced practitioner if interventions unsuccessful or patient reports new pain  Outcome: Progressing     Problem: DISCHARGE PLANNING  Goal: Discharge to home or other facility with appropriate resources  Description: INTERVENTIONS:  - Identify barriers to discharge w/patient and caregiver  - Arrange for needed discharge resources and transportation as appropriate  - Identify discharge learning needs (meds, wound care, etc.)  - Arrange for interpretive services to assist at discharge as needed  - Refer to Case Management Department for coordinating discharge planning if the patient needs post-hospital services based on physician/advanced practitioner order or complex needs related to functional status, cognitive ability, or social support system  Outcome: Progressing     Problem: Knowledge Deficit  Goal: Patient/family/caregiver demonstrates understanding of disease process, treatment plan, medications, and discharge instructions  Description: Complete learning assessment and assess knowledge base.   Interventions:  - Provide teaching at level of understanding  - Provide teaching via preferred learning methods  Outcome: Progressing     Problem: RESPIRATORY - ADULT  Goal: Achieves optimal ventilation and oxygenation  Description: INTERVENTIONS:  - Assess for changes in respiratory status  - Assess for changes in mentation and behavior  - Position to facilitate oxygenation and minimize respiratory effort  - Oxygen administered by appropriate delivery if ordered  - Initiate smoking cessation education as indicated  - Encourage broncho-pulmonary hygiene including cough, deep breathe, Incentive Spirometry  - Assess the need for suctioning and aspirate as needed  - Assess and instruct to report SOB or any respiratory difficulty  - Respiratory Therapy support as indicated  Outcome: Progressing     Problem: CARDIOVASCULAR - ADULT  Goal: Maintains optimal cardiac output and hemodynamic stability  Description: INTERVENTIONS:  - Monitor I/O, vital signs and rhythm  - Monitor for S/S and trends of decreased cardiac output  - Administer and titrate ordered vasoactive medications to optimize hemodynamic stability  - Assess quality of pulses, skin color and temperature  - Assess for signs of decreased coronary artery perfusion  - Instruct patient to report change in severity of symptoms  Outcome: Progressing  Goal: Absence of cardiac dysrhythmias or at baseline rhythm  Description: INTERVENTIONS:  - Continuous cardiac monitoring, vital signs, obtain 12 lead EKG if ordered  - Administer antiarrhythmic and heart rate control medications as ordered  - Monitor electrolytes and administer replacement therapy as ordered  Outcome: Progressing     Problem: METABOLIC, FLUID AND ELECTROLYTES - ADULT  Goal: Electrolytes maintained within normal limits  Description: INTERVENTIONS:  - Monitor labs and assess patient for signs and symptoms of electrolyte imbalances  - Administer electrolyte replacement as ordered  - Monitor response to electrolyte replacements, including repeat lab results as appropriate  - Instruct patient on fluid and nutrition as appropriate  Outcome: Progressing  Goal: Fluid balance maintained  Description: INTERVENTIONS:  - Monitor labs   - Monitor I/O and WT  - Instruct patient on fluid and nutrition as appropriate  - Assess for signs & symptoms of volume excess or deficit  Outcome: Progressing  Goal: Glucose maintained within target range  Description: INTERVENTIONS:  - Monitor Blood Glucose as ordered  - Assess for signs and symptoms of hyperglycemia and hypoglycemia  - Administer ordered medications to maintain glucose within target range  - Assess nutritional intake and initiate nutrition service referral as needed  Outcome: Progressing        Problem: HEMATOLOGIC - ADULT  Goal: Maintains hematologic stability  Description: INTERVENTIONS  - Assess for signs and symptoms of bleeding or hemorrhage  - Monitor labs  - Administer supportive blood products/factors as ordered and appropriate  Outcome: Progressing     Problem: MUSCULOSKELETAL - ADULT  Goal: Maintain or return mobility to safest level of function  Description: INTERVENTIONS:  - Assess patient's ability to carry out ADLs; assess patient's baseline for ADL function and identify physical deficits which impact ability to perform ADLs (bathing, care of mouth/teeth, toileting, grooming, dressing, etc.)  - Assess/evaluate cause of self-care deficits   - Assess range of motion  - Assess patient's mobility  - Assess patient's need for assistive devices and provide as appropriate  - Encourage maximum independence but intervene and supervise when necessary  - Involve family in performance of ADLs  - Assess for home care needs following discharge   - Consider OT consult to assist with ADL evaluation and planning for discharge  - Provide patient education as appropriate  Outcome: Progressing     Problem: MOBILITY - ADULT  Goal: Maintain or return to baseline ADL function  Description: INTERVENTIONS:  -  Assess patient's ability to carry out ADLs; assess patient's baseline for ADL function and identify physical deficits which impact ability to perform ADLs (bathing, care of mouth/teeth, toileting, grooming, dressing, etc.)  - Assess/evaluate cause of self-care deficits   - Assess range of motion  - Assess patient's mobility; develop plan if impaired  - Assess patient's need for assistive devices and provide as appropriate  - Encourage maximum independence but intervene and supervise when necessary  - Involve family in performance of ADLs  - Assess for home care needs following discharge   - Consider OT consult to assist with ADL evaluation and planning for discharge  - Provide patient education as appropriate  Outcome: Progressing  Goal: Maintains/Returns to pre admission functional level  Description: INTERVENTIONS:  - Perform BMAT or MOVE assessment daily.   - Set and communicate daily mobility goal to care team and patient/family/caregiver. - Collaborate with rehabilitation services on mobility goals if consulted  - Perform Range of Motion 3 times a day. - Reposition patient every 2 hours.   - Dangle patient 3 times a day  - Stand patient 3 times a day  - Ambulate patient 3 times a day  - Out of bed to chair 3 times a day   - Out of bed for meals 3 times a day  - Out of bed for toileting  - Record patient progress and toleration of activity level   Outcome: Progressing     Problem: Prexisting or High Potential for Compromised Skin Integrity  Goal: Skin integrity is maintained or improved  Description: INTERVENTIONS:  - Identify patients at risk for skin breakdown  - Assess and monitor skin integrity  - Assess and monitor nutrition and hydration status  - Monitor labs   - Assess for incontinence   - Turn and reposition patient  - Assist with mobility/ambulation  - Relieve pressure over bony prominences  - Avoid friction and shearing  - Provide appropriate hygiene as needed including keeping skin clean and dry  - Evaluate need for skin moisturizer/barrier cream  - Collaborate with interdisciplinary team   - Patient/family teaching  - Consider wound care consult   Outcome: Progressing     Problem: Nutrition/Hydration-ADULT  Goal: Nutrient/Hydration intake appropriate for improving, restoring or maintaining nutritional needs  Description: Monitor and assess patient's nutrition/hydration status for malnutrition. Collaborate with interdisciplinary team and initiate plan and interventions as ordered. Monitor patient's weight and dietary intake as ordered or per policy. Utilize nutrition screening tool and intervene as necessary. Determine patient's food preferences and provide high-protein, high-caloric foods as appropriate.      INTERVENTIONS:  - Monitor oral intake, urinary output, labs, and treatment plans  - Assess nutrition and hydration status and recommend course of action  - Evaluate amount of meals eaten  - Assist patient with eating if necessary   - Allow adequate time for meals  - Recommend/ encourage appropriate diets, oral nutritional supplements, and vitamin/mineral supplements  - Order, calculate, and assess calorie counts as needed  - Recommend, monitor, and adjust tube feedings and TPN/PPN based on assessed needs  - Assess need for intravenous fluids  - Provide specific nutrition/hydration education as appropriate  - Include patient/family/caregiver in decisions related to nutrition  Outcome: Progressing

## 2023-09-11 ENCOUNTER — TRANSITIONAL CARE MANAGEMENT (OUTPATIENT)
Dept: FAMILY MEDICINE CLINIC | Facility: HOSPITAL | Age: 63
End: 2023-09-11

## 2023-09-11 ENCOUNTER — PATIENT OUTREACH (OUTPATIENT)
Dept: FAMILY MEDICINE CLINIC | Facility: HOSPITAL | Age: 63
End: 2023-09-11

## 2023-09-11 ENCOUNTER — TELEPHONE (OUTPATIENT)
Dept: CARDIOLOGY CLINIC | Facility: CLINIC | Age: 63
End: 2023-09-11

## 2023-09-11 LAB
BACTERIA BLD CULT: NORMAL
BACTERIA BLD CULT: NORMAL
DME PARACHUTE DELIVERY DATE EXPECTED: NORMAL
DME PARACHUTE DELIVERY DATE REQUESTED: NORMAL
DME PARACHUTE ITEM DESCRIPTION: NORMAL
DME PARACHUTE ORDER STATUS: NORMAL
DME PARACHUTE SUPPLIER NAME: NORMAL
DME PARACHUTE SUPPLIER PHONE: NORMAL

## 2023-09-11 NOTE — TELEPHONE ENCOUNTER
Patient is scheduled 9/26/2023 with Dr Jeanna Noyola already.   There are no sooner appointments available with Dr Jeanna Noyola and/or with Physician Assistant's  ([email or web address])

## 2023-09-11 NOTE — PROGRESS NOTES
Outpatient Care Management Note:    ADT alert received that patient was discharged to home. Patient was admitted with CHF. Her torsemide was discontinued, and she was started on bumex. She was encouraged to be more compliant with her diet and lifestyle modification. Voice mail message left for Ivy, with my contact information, requesting a ramy back.

## 2023-09-11 NOTE — UTILIZATION REVIEW
NOTIFICATION OF ADMISSION DISCHARGE   This is a Notification of Discharge from [facility]. Please be advised that this patient has been discharge from our facility. Below you will find the admission and discharge date and time including the patient’s disposition. UTILIZATION REVIEW CONTACT:  Reyna Hay  Utilization   Network Utilization Review Department  Phone: [phone] carefully listen to the prompts. All voicemails are confidential.  Email: [email or web address]     ADMISSION INFORMATION  PRESENTATION DATE: 9/5/2023  2:40 PM  OBERVATION ADMISSION DATE:  INPATIENT ADMISSION DATE: 9/5/23  4:16 PM   DISCHARGE DATE: 9/8/2023  4:43 PM   DISPOSITION:Home with Home Health Care    IMPORTANT INFORMATION:  Send all requests for admission clinical reviews, approved or denied determinations and any other requests to dedicated fax number below belonging to the campus where the patient is receiving treatment.  List of dedicated fax numbers:  [place] DENIALS (Administrative/Medical Necessity) [phone] [address] [facility] (Maternity/NICU/Pediatrics) [phone]   [facility] [phone]   [facility] [phone] [address] [facility] [phone]   [address] [place] [address] [phone]   [facility] [phone]   [address] [place] [address] [address] [facility] [phone]   [address] [place] [address] [phone]   [address] [facility] [phone] [address] [facility] [address] [facility] [phone]

## 2023-09-11 NOTE — TELEPHONE ENCOUNTER
Good morning,    Patient was seen in the hospital with heart failure. Patient has a scheduled appointment in the office on 9/26. Can you please see if she can be seen sooner. If she there is no availability to do so we can stick with the 9/26 appointment.      Thank you,    Neris Collins

## 2023-09-12 ENCOUNTER — PATIENT OUTREACH (OUTPATIENT)
Dept: FAMILY MEDICINE CLINIC | Facility: HOSPITAL | Age: 63
End: 2023-09-12

## 2023-09-12 DIAGNOSIS — N18.2 TYPE 2 DIABETES MELLITUS WITH STAGE 2 CHRONIC KIDNEY DISEASE, WITH LONG-TERM CURRENT USE OF INSULIN (HCC): ICD-10-CM

## 2023-09-12 DIAGNOSIS — E11.22 TYPE 2 DIABETES MELLITUS WITH STAGE 2 CHRONIC KIDNEY DISEASE, WITH LONG-TERM CURRENT USE OF INSULIN (HCC): ICD-10-CM

## 2023-09-12 DIAGNOSIS — B35.4 TINEA CORPORIS: ICD-10-CM

## 2023-09-12 DIAGNOSIS — Z79.4 TYPE 2 DIABETES MELLITUS WITH STAGE 2 CHRONIC KIDNEY DISEASE, WITH LONG-TERM CURRENT USE OF INSULIN (HCC): ICD-10-CM

## 2023-09-12 NOTE — PROGRESS NOTES
Outpatient Care Management Note:    Care manager called Ivy. She states that her breathing is a little better. She still gets short of breath with activity. She is using her oxygen at 4L and states that her pulse ox has been averaging around 94%. She is aware that she does not have a pulmonary follow up. Ivy is checking daily weights. Today and yesterday, she weighed 285 lbs. She knows to call cardiology if her weight increases by 3 lb in 1 day or 5 lb in 1 week. We reviewed her 1500cc fluid restriction. We discussed how to measure fluid intake. We also discussed how to read a food label and foods high in salt. CM reviewed upcoming appts. She was unaware of tomorrow's PCP appt. She will ask her son to take her. CM stressed the importance of close follow up to prevent rehospitalization. Ivy has been in the hospital every month since June. Ivy is aware that she needs fasting blood work completed prior to her upcoming endocrine appt. Ivy was ordered a wheelchair. The company came to deliver it, but she missed them. She is waiting on a call back. Ivy has my contact information and will call with any questions.

## 2023-09-13 ENCOUNTER — APPOINTMENT (EMERGENCY)
Dept: RADIOLOGY | Facility: HOSPITAL | Age: 63
End: 2023-09-13
Payer: COMMERCIAL

## 2023-09-13 ENCOUNTER — OFFICE VISIT (OUTPATIENT)
Dept: FAMILY MEDICINE CLINIC | Facility: HOSPITAL | Age: 63
End: 2023-09-13
Payer: COMMERCIAL

## 2023-09-13 ENCOUNTER — HOSPITAL ENCOUNTER (EMERGENCY)
Facility: HOSPITAL | Age: 63
Discharge: HOME | End: 2023-09-13
Attending: EMERGENCY MEDICINE
Payer: COMMERCIAL

## 2023-09-13 VITALS
BODY MASS INDEX: 50.36 KG/M2 | DIASTOLIC BLOOD PRESSURE: 62 MMHG | WEIGHT: 284.2 LBS | OXYGEN SATURATION: 90 % | HEIGHT: 63 IN | SYSTOLIC BLOOD PRESSURE: 118 MMHG | HEART RATE: 74 BPM | TEMPERATURE: 97.6 F

## 2023-09-13 VITALS
HEIGHT: 63 IN | OXYGEN SATURATION: 92 % | RESPIRATION RATE: 30 BRPM | TEMPERATURE: 98.1 F | SYSTOLIC BLOOD PRESSURE: 130 MMHG | BODY MASS INDEX: 50.32 KG/M2 | HEART RATE: 97 BPM | WEIGHT: 284 LBS | DIASTOLIC BLOOD PRESSURE: 70 MMHG

## 2023-09-13 DIAGNOSIS — R06.02 SOB (SHORTNESS OF BREATH): Primary | ICD-10-CM

## 2023-09-13 DIAGNOSIS — Z23 ENCOUNTER FOR IMMUNIZATION: ICD-10-CM

## 2023-09-13 DIAGNOSIS — I50.32 CHRONIC DIASTOLIC CONGESTIVE HEART FAILURE (HCC): ICD-10-CM

## 2023-09-13 DIAGNOSIS — Z12.31 ENCOUNTER FOR SCREENING MAMMOGRAM FOR MALIGNANT NEOPLASM OF BREAST: ICD-10-CM

## 2023-09-13 DIAGNOSIS — J44.9 COPD, SEVERE (HCC): ICD-10-CM

## 2023-09-13 DIAGNOSIS — J96.11 CHRONIC RESPIRATORY FAILURE WITH HYPOXIA (HCC): ICD-10-CM

## 2023-09-13 DIAGNOSIS — R06.02 SHORTNESS OF BREATH: Primary | ICD-10-CM

## 2023-09-13 DIAGNOSIS — J96.12 CHRONIC HYPERCAPNIC RESPIRATORY FAILURE (HCC): ICD-10-CM

## 2023-09-13 DIAGNOSIS — J44.1 COPD WITH ACUTE EXACERBATION (HCC): ICD-10-CM

## 2023-09-13 LAB
2HR DELTA HS TROPONIN: -2 NG/L
ANION GAP SERPL CALCULATED.3IONS-SCNC: 6 MMOL/L
BASOPHILS # BLD AUTO: 0.05 THOUSANDS/ÂΜL (ref 0–0.1)
BASOPHILS NFR BLD AUTO: 0 % (ref 0–1)
BNP SERPL-MCNC: 98 PG/ML (ref 0–100)
BUN SERPL-MCNC: 12 MG/DL (ref 5–25)
CALCIUM SERPL-MCNC: 8.8 MG/DL (ref 8.4–10.2)
CARDIAC TROPONIN I PNL SERPL HS: 4 NG/L
CARDIAC TROPONIN I PNL SERPL HS: 6 NG/L
CHLORIDE SERPL-SCNC: 95 MMOL/L (ref 96–108)
CO2 SERPL-SCNC: 36 MMOL/L (ref 21–32)
CREAT SERPL-MCNC: 0.76 MG/DL (ref 0.6–1.3)
D DIMER PPP FEU-MCNC: <0.27 UG/ML FEU
EOSINOPHIL # BLD AUTO: 0.32 THOUSAND/ÂΜL (ref 0–0.61)
EOSINOPHIL NFR BLD AUTO: 2 % (ref 0–6)
ERYTHROCYTE [DISTWIDTH] IN BLOOD BY AUTOMATED COUNT: 23.9 % (ref 11.6–15.1)
FLUAV RNA RESP QL NAA+PROBE: NEGATIVE
FLUBV RNA RESP QL NAA+PROBE: NEGATIVE
GFR SERPL CREATININE-BSD FRML MDRD: 83 ML/MIN/1.73SQ M
GLUCOSE SERPL-MCNC: 226 MG/DL (ref 65–140)
HCT VFR BLD AUTO: 38.9 % (ref 34.8–46.1)
HGB BLD-MCNC: 11.1 G/DL (ref 11.5–15.4)
IMM GRANULOCYTES # BLD AUTO: 0.11 THOUSAND/UL (ref 0–0.2)
IMM GRANULOCYTES NFR BLD AUTO: 1 % (ref 0–2)
LYMPHOCYTES # BLD AUTO: 2.19 THOUSANDS/ÂΜL (ref 0.6–4.47)
LYMPHOCYTES NFR BLD AUTO: 15 % (ref 14–44)
MCH RBC QN AUTO: 21 PG (ref 26.8–34.3)
MCHC RBC AUTO-ENTMCNC: 28.5 G/DL (ref 31.4–37.4)
MCV RBC AUTO: 74 FL (ref 82–98)
MONOCYTES # BLD AUTO: 0.87 THOUSAND/ÂΜL (ref 0.17–1.22)
MONOCYTES NFR BLD AUTO: 6 % (ref 4–12)
NEUTROPHILS # BLD AUTO: 11.15 THOUSANDS/ÂΜL (ref 1.85–7.62)
NEUTS SEG NFR BLD AUTO: 76 % (ref 43–75)
NRBC BLD AUTO-RTO: 0 /100 WBCS
PLATELET # BLD AUTO: 394 THOUSANDS/UL (ref 149–390)
PMV BLD AUTO: 11.3 FL (ref 8.9–12.7)
POTASSIUM SERPL-SCNC: 4 MMOL/L (ref 3.5–5.3)
RBC # BLD AUTO: 5.29 MILLION/UL (ref 3.81–5.12)
RSV RNA RESP QL NAA+PROBE: NEGATIVE
SARS-COV-2 RNA RESP QL NAA+PROBE: NEGATIVE
SODIUM SERPL-SCNC: 137 MMOL/L (ref 135–147)
WBC # BLD AUTO: 14.69 THOUSAND/UL (ref 4.31–10.16)

## 2023-09-13 PROCEDURE — 71045 X-RAY EXAM CHEST 1 VIEW: CPT

## 2023-09-13 PROCEDURE — 83880 ASSAY OF NATRIURETIC PEPTIDE: CPT | Performed by: PHYSICIAN ASSISTANT

## 2023-09-13 PROCEDURE — 90471 IMMUNIZATION ADMIN: CPT

## 2023-09-13 PROCEDURE — 90682 RIV4 VACC RECOMBINANT DNA IM: CPT

## 2023-09-13 PROCEDURE — 85379 FIBRIN DEGRADATION QUANT: CPT | Performed by: PHYSICIAN ASSISTANT

## 2023-09-13 PROCEDURE — 85025 COMPLETE CBC W/AUTO DIFF WBC: CPT | Performed by: PHYSICIAN ASSISTANT

## 2023-09-13 PROCEDURE — 90472 IMMUNIZATION ADMIN EACH ADD: CPT

## 2023-09-13 PROCEDURE — 99215 OFFICE O/P EST HI 40 MIN: CPT | Performed by: FAMILY MEDICINE

## 2023-09-13 PROCEDURE — 0241U HB NFCT DS VIR RESP RNA 4 TRGT: CPT | Performed by: PHYSICIAN ASSISTANT

## 2023-09-13 PROCEDURE — 94640 AIRWAY INHALATION TREATMENT: CPT

## 2023-09-13 PROCEDURE — 99285 EMERGENCY DEPT VISIT HI MDM: CPT | Performed by: PHYSICIAN ASSISTANT

## 2023-09-13 PROCEDURE — 90715 TDAP VACCINE 7 YRS/> IM: CPT

## 2023-09-13 PROCEDURE — 36415 COLL VENOUS BLD VENIPUNCTURE: CPT | Performed by: PHYSICIAN ASSISTANT

## 2023-09-13 PROCEDURE — 80048 BASIC METABOLIC PNL TOTAL CA: CPT | Performed by: PHYSICIAN ASSISTANT

## 2023-09-13 PROCEDURE — 99285 EMERGENCY DEPT VISIT HI MDM: CPT

## 2023-09-13 PROCEDURE — 93005 ELECTROCARDIOGRAM TRACING: CPT

## 2023-09-13 PROCEDURE — 84484 ASSAY OF TROPONIN QUANT: CPT | Performed by: PHYSICIAN ASSISTANT

## 2023-09-13 RX ORDER — IPRATROPIUM BROMIDE AND ALBUTEROL SULFATE 2.5; .5 MG/3ML; MG/3ML
3 SOLUTION RESPIRATORY (INHALATION)
Status: DISCONTINUED | OUTPATIENT
Start: 2023-09-13 | End: 2023-09-13

## 2023-09-13 RX ORDER — AZITHROMYCIN 250 MG/1
TABLET, FILM COATED ORAL
Qty: 6 TABLET | Refills: 0 | Status: SHIPPED | OUTPATIENT
Start: 2023-09-13 | End: 2023-09-17

## 2023-09-13 RX ORDER — PEN NEEDLE, DIABETIC 32GX 5/32"
NEEDLE, DISPOSABLE MISCELLANEOUS
Qty: 400 EACH | Refills: 3 | Status: SHIPPED | OUTPATIENT
Start: 2023-09-13

## 2023-09-13 RX ORDER — PREDNISONE 20 MG/1
20 TABLET ORAL DAILY
Qty: 5 TABLET | Refills: 0 | Status: SHIPPED | OUTPATIENT
Start: 2023-09-13 | End: 2023-09-18

## 2023-09-13 RX ORDER — IPRATROPIUM BROMIDE AND ALBUTEROL SULFATE 2.5; .5 MG/3ML; MG/3ML
3 SOLUTION RESPIRATORY (INHALATION) ONCE
Status: COMPLETED | OUTPATIENT
Start: 2023-09-13 | End: 2023-09-13

## 2023-09-13 RX ADMIN — IPRATROPIUM BROMIDE AND ALBUTEROL SULFATE 3 ML: 2.5; .5 SOLUTION RESPIRATORY (INHALATION) at 16:40

## 2023-09-13 RX ADMIN — IPRATROPIUM BROMIDE AND ALBUTEROL SULFATE 3 ML: .5; 3 SOLUTION RESPIRATORY (INHALATION) at 19:36

## 2023-09-13 NOTE — ED PROVIDER NOTES
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 Hx WILMA / DM2 / CKD and atrial fibrillation on Lopresor and Eliquis. She was adm per records reviewed on 9/5-9/8 for AEDCHF. She states she has been c/w with her diuretics and 2 L fluid restriction. She presents today for worsening SOB. No increases in 02 therapy. No cough or mucus production. No notable LE edema and no hemoptysis. Denies CP / neck or jaw claudication, diaphoresis, or syncopal events. Prior to Admission Medications   Prescriptions Last Dose Informant Patient Reported? Taking? BD Pen Needle Love 2nd Gen 32G X 4 MM MISC   No No   Sig: USE DAILY AT BEDTIME USE 4 NEW NEEDLES DAILY . Benralizumab 30 MG/ML SOAJ  Self No No   Sig: Inject 1 mL under the skin every 28 days   Patient not taking: Reported on 9/13/2023   Blood Glucose Monitoring Suppl ([employer] Contour Link 2. 4) w/Device KIT  Self No No   Sig: Test 3 times daily   [employer] Lancets Thin 26G MISC  Self No No   Sig: USE 3 (THREE) TIMES A DAY   Contour Next Test test strip  Self No No   Sig: USE TO TEST BLOOD SUGAR 3 TIMES A DAY   EPINEPHrine (EPIPEN) 0.3 mg/0.3 mL SOAJ  Self No No   Sig: Inject 0.3 mL (0.3 mg total) into a muscle once for 1 dose   LORazepam (ATIVAN) 0.5 mg tablet  Self No No   Sig: TAKE 2 TABLETS BY MOUTH AT BEDTIME AND 1 EVERY 6 HOURS AS NEEDED FOR ANXIETY   Lantus SoloStar 100 units/mL SOPN  Self No No   Sig: INJECT 0.3 ML (30 UNITS TOTAL) UNDER THE SKIN DAILY AT BEDTIME   albuterol (2.5 mg/3 mL) 0.083 % nebulizer solution  Self No No   Sig: Take 3 mL (2.5 mg total) by nebulization every 6 (six) hours as needed for wheezing or shortness of breath   albuterol (PROVENTIL HFA,VENTOLIN HFA) 90 mcg/act inhaler  Self No No   Sig: Inhale 2 puffs every 4 (four) hours as needed for wheezing   apixaban (Eliquis) 5 mg  Self No No   Sig: Take 1 tablet (5 mg total) by mouth 2 (two) times a day   atorvastatin (LIPITOR) 40 mg tablet  Self No No   Sig: Take 1 tablet (40 mg total) by mouth daily   budesonide (PULMICORT) 0.5 mg/2 mL nebulizer solution  Self No No   Sig: TAKE 2 ML (0.5 MG TOTAL) BY NEBULIZATION TWICE A DAY RINSE MOUTH AFTER USE   bumetanide (BUMEX) 2 mg tablet   No No   Sig: Take 2 tablets (4 mg total) by mouth 2 (two) times a day   escitalopram (LEXAPRO) 20 mg tablet  Self No No   Sig: TAKE 1 TABLET BY MOUTH EVERY DAY   ferrous sulfate 220 (44 Fe) mg/5 mL solution  Self No No   Sig: TAKE 5 ML (220 MG TOTAL) BY MOUTH 2 (TWO) TIMES A DAY BEFORE MEALS   fluticasone (FLONASE) 50 mcg/act nasal spray  Self No No   Si spray into each nostril 2 (two) times a day   glycopyrrolate-formoterol (BEVESPI AEROSPHERE) 9-4.8 MCG/ACT inhaler  Self No No   Sig: Inhale 2 puffs 2 (two) times a day   insulin glulisine (Apidra SoloStar) 100 units/mL injection pen  Self No No   Si UNITS BREAKFAST 15 UNITS AT LUNCH AND DINNER   Patient taking differently: 30 UNITS BREAKFAST 15 UNITS AT LUNCH AND DINNER   levalbuterol (XOPENEX) 1.25 mg/0.5 mL nebulizer solution  Self No No   Sig: Take 0.5 mL (1.25 mg total) by nebulization 2 (two) times a day   loratadine (CLARITIN) 10 mg tablet  Self Yes No   Sig: Take by mouth   metFORMIN (GLUCOPHAGE-XR) 500 mg 24 hr tablet  Self No No   Sig: TAKE 2 TABLETS BY MOUTH TWICE A DAY WITH FOOD   metoprolol succinate (TOPROL-XL) 100 mg 24 hr tablet  Self No No   Sig: Take 1 tablet (100 mg total) by mouth daily   montelukast (SINGULAIR) 10 mg tablet  Self No No   Sig: TAKE 1 TABLET BY MOUTH DAILY AT BEDTIME   naloxone (NARCAN) 4 mg/0.1 mL nasal spray  Self No No   Sig: Administer 1 spray into a nostril.  If breathing does not return to normal or if breathing difficulty resumes after 2-3 minutes, give another dose in the other nostril using a new spray. omeprazole (PriLOSEC) 40 MG capsule  Self No No   Sig: TAKE 1 CAPSULE (40 MG TOTAL) BY MOUTH DAILY. potassium chloride (K-DUR,KLOR-CON) 20 mEq tablet  Self No No   Sig: Take 2 tablets (40 mEq total) by mouth daily Do not start before August 23, 2023. semaglutide, 0.25 or 0.5 mg/dose, (Ozempic, 0.25 or 0.5 MG/DOSE,) 2 mg/3 mL injection pen  Self No No   Sig: Inject 0.75 mL (0.5 mg total) under the skin every 7 days   Patient not taking: Reported on 9/13/2023   traZODone (DESYREL) 150 mg tablet  Self No No   Sig: TAKE 1 TABLET BY MOUTH AT BEDTIME      Facility-Administered Medications: None       Past Medical History:   Diagnosis Date   • Acute blood loss anemia 6/1/2021   • Acute diverticulitis 5/2/2021   • Alcohol abuse 5/21/2020   • Anemia    • Atrial fibrillation (HCC)    • Cervical radiculopathy    • CHF (congestive heart failure) (HCC)    • Chronic low back pain    • Chronic obstructive asthma ([address]) 2/20/2018   • Cigarette nicotine dependence without complication 90/98/1088    Quit 12/10/2019.     • Community acquired pneumonia 5/21/2020   • Depression with anxiety 3/9/2014   • Diabetes mellitus with hyperglycemia ([address])    • Diverticulitis 6/6/2021   • Elevated liver enzymes    • GERD (gastroesophageal reflux disease)    • Hypersomnolence 10/28/2020   • Hypokalemia 12/4/2018   • Lower gastrointestinal bleeding 6/1/2021   • Paresthesia of upper extremity    • Plantar fasciitis    • Restless legs syndrome 4/8/2014   • Sexual dysfunction    • Sleep apnea, obstructive    • Tenosynovitis of finger    • Tinea corporis    • Tobacco use 2/20/2018    Currently smoking 3-4 cigarettes daily   • Trigger middle finger of left hand 12/14/2017   • Trigger ring finger of left hand 12/14/2017   • Weakness of upper extremity        Past Surgical History:   Procedure Laterality Date   • ABDOMINAL SURGERY     • CARPAL TUNNEL RELEASE Bilateral    •  SECTION     • CHOLECYSTECTOMY      laparoscopic   • ESOPHAGOGASTRODUODENOSCOPY N/A 12/3/2018    Procedure: ESOPHAGOGASTRODUODENOSCOPY (EGD) AND COLONOSCOPY;  Surgeon: Dennys Christensen MD;  Location: [facility];  Service: Gastroenterology   • GASTRIC BYPASS      for morbid obesity with gilda en y   • HERNIA REPAIR     • HYSTERECTOMY     • HI EXCISION GANGLION WRIST DORSAL/VOLAR PRIMARY Left 2017    Procedure: LONG FINGER GANGLION CYST EXCISION;  Surgeon: Ruslan Mendosa MD;  Location: [facility];  Service: Orthopedics   • HI TENDON SHEATH INCISION Left 2017    Procedure: Ankur De Jesus, RING, TRIGGER FINGER RELEASE;  Surgeon: Ruslan Mendosa MD;  Location: [facility];  Service: Orthopedics   • SHOULDER SURGERY Right        Family History   Problem Relation Age of Onset   • Alzheimer's disease Mother    • Atrial fibrillation Mother    • Dementia Mother    • Heart disease Mother         heart problem   • Seizures Mother    • Parkinsonism Father    • Arthritis Father    • Atrial fibrillation Father    • No Known Problems Daughter    • Cri-du-chat syndrome Daughter    • Colon cancer Maternal Grandmother 80   • Diabetes type II Maternal Grandmother    • No Known Problems Brother    • No Known Problems Son    • Substance Abuse Neg Hx         mother,father   • Mental illness Neg Hx         mother,father   • Colon polyps Neg Hx      I have reviewed and agree with the history as documented.     E-Cigarette/Vaping   • E-Cigarette Use Never User      E-Cigarette/Vaping Substances   • Nicotine No    • THC No    • CBD No    • Flavoring No    • Other No    • Unknown No      Social History     Tobacco Use   • Smoking status: Former     Packs/day: 0.25     Years: 29.00     Total pack years: 7.25     Types: Cigarettes     Start date: 200     Quit date: 12/10/2019     Years since quitting: 3.7   • Smokeless tobacco: Never   Vaping Use   • Vaping Use: Never used   Substance Use Topics   • Alcohol use: Not Currently     Alcohol/week: 20.0 standard drinks of alcohol     Types: 20 Cans of beer per week     Comment: about 6 coors light every night, stopped in 2019   • Drug use: No       Review of Systems   Constitutional: Negative for activity change, appetite change, chills, fatigue and fever. HENT: Negative for postnasal drip, rhinorrhea, sinus pressure and sinus pain. Eyes: Negative for visual disturbance. Respiratory: Positive for shortness of breath. Negative for apnea, cough, chest tightness, wheezing and stridor. Cardiovascular: Negative for chest pain and leg swelling. Gastrointestinal: Negative for diarrhea, nausea and vomiting. Endocrine: Negative for cold intolerance and heat intolerance. Musculoskeletal: Negative for arthralgias, back pain, joint swelling and myalgias. Skin: Negative for color change. Neurological: Negative for syncope and light-headedness. Hematological: Negative for adenopathy. Psychiatric/Behavioral: Negative for confusion and sleep disturbance. Physical Exam  Physical Exam  Vitals and nursing note reviewed. Constitutional:       General: She is not in acute distress. Appearance: She is well-developed. She is morbidly obese. HENT:      Head: Normocephalic and atraumatic. Eyes:      Conjunctiva/sclera: Conjunctivae normal.   Cardiovascular:      Rate and Rhythm: Normal rate and regular rhythm. Heart sounds: No murmur heard. Pulmonary:      Effort: Pulmonary effort is normal. No accessory muscle usage or respiratory distress. Breath sounds: Decreased air movement present. Examination of the right-upper field reveals decreased breath sounds. Examination of the left-upper field reveals decreased breath sounds. Examination of the right-middle field reveals decreased breath sounds. Examination of the left-middle field reveals decreased breath sounds.  Examination of the right-lower field reveals decreased breath sounds. Examination of the left-lower field reveals decreased breath sounds. Decreased breath sounds present. No wheezing, rhonchi or rales. Comments: 95% on 4 L NC   +pursed lip breathing  Abdominal:      Palpations: Abdomen is soft. Tenderness: There is no abdominal tenderness. Musculoskeletal:         General: No swelling. Cervical back: Neck supple. Skin:     General: Skin is warm and dry. Capillary Refill: Capillary refill takes less than 2 seconds. Neurological:      Mental Status: She is alert.    Psychiatric:         Mood and Affect: Mood normal.         Vital Signs  ED Triage Vitals   Temperature Pulse Respirations Blood Pressure SpO2   09/13/23 1537 09/13/23 1537 09/13/23 1537 09/13/23 1538 09/13/23 1538   98.1 °F (36.7 °C) 72 20 124/62 96 %      Temp Source Heart Rate Source Patient Position - Orthostatic VS BP Location FiO2 (%)   09/13/23 1537 09/13/23 1537 09/13/23 1537 09/13/23 1537 --   Temporal Monitor Sitting Left arm       Pain Score       09/13/23 1537       No Pain           Vitals:    09/13/23 1538 09/13/23 1700 09/13/23 1715 09/13/23 1830   BP: 124/62 130/70     Pulse:  69 72 97   Patient Position - Orthostatic VS:             Visual Acuity      ED Medications  Medications   ipratropium-albuterol (DUO-NEB) 0.5-2.5 mg/3 mL inhalation solution 3 mL (3 mL Nebulization Given 9/13/23 1640)   ipratropium-albuterol (DUO-NEB) 0.5-2.5 mg/3 mL inhalation solution 3 mL (3 mL Nebulization Given 9/13/23 1936)       Diagnostic Studies  Results Reviewed     Procedure Component Value Units Date/Time    HS Troponin I 2hr [[id number]]  (Normal) Collected: 09/13/23 1938    Lab Status: Final result Specimen: Blood from Arm, Right Updated: 09/13/23 2004     hs TnI 2hr 4 ng/L      Delta 2hr hsTnI -2 ng/L     FLU/RSV/COVID - if FLU/RSV clinically relevant [[id number]]  (Normal) Collected: 09/13/23 1838    Lab Status: Final result Specimen: Nares from Nose Updated: 09/13/23 1923     SARS-CoV-2 Negative     INFLUENZA A PCR Negative     INFLUENZA B PCR Negative     RSV PCR Negative    Narrative:      FOR PEDIATRIC PATIENTS - copy/paste COVID Guidelines URL to browser: [email or web address]    SARS-CoV-2 assay is a Nucleic Acid Amplification assay intended for the  qualitative detection of nucleic acid from SARS-CoV-2 in nasopharyngeal  swabs. Results are for the presumptive identification of SARS-CoV-2 RNA. Positive results are indicative of infection with SARS-CoV-2, the virus  causing COVID-19, but do not rule out bacterial infection or co-infection  with other viruses. Laboratories within the [facility] and its  territories are required to report all positive results to the appropriate  public health authorities. Negative results do not preclude SARS-CoV-2  infection and should not be used as the sole basis for treatment or other  patient management decisions. Negative results must be combined with  clinical observations, patient history, and epidemiological information. This test has not been FDA cleared or approved. This test has been authorized by FDA under an Emergency Use Authorization  (EUA). This test is only authorized for the duration of time the  declaration that circumstances exist justifying the authorization of the  emergency use of an in vitro diagnostic tests for detection of SARS-CoV-2  virus and/or diagnosis of COVID-19 infection under section 564(b)(1) of  the Act, 21 U. S.C. 195ETF-0(T)(2), unless the authorization is terminated  or revoked sooner. The test has been validated but independent review by FDA  and CLIA is pending. Test performed using [employer] GeneXpert: This RT-PCR assay targets N2,  a region unique to SARS-CoV-2. A conserved region in the E-gene was chosen  for pan-Sarbecovirus detection which includes SARS-CoV-2.     According to CMS-2020-01-R, this platform meets the definition of high-[employer] technology. D-dimer, quantitative [[id number]]  (Normal) Collected: 09/13/23 1657    Lab Status: Final result Specimen: Blood from Arm, Right Updated: 09/13/23 1824     D-Dimer, Quant <0.27 ug/ml FEU     Narrative: In the evaluation for possible pulmonary embolism, in the appropriate (Well's Score of 4 or less) patient, the age adjusted d-dimer cutoff for this patient can be calculated as:    Age x 0.01 (in ug/mL) for Age-adjusted D-dimer exclusion threshold for a patient over 50 years.     HS Troponin 0hr (reflex protocol) [[id number]]  (Normal) Collected: 09/13/23 1657    Lab Status: Final result Specimen: Blood from Arm, Right Updated: 09/13/23 1731     hs TnI 0hr 6 ng/L     B-Type Natriuretic Peptide(BNP) [[id number]]  (Normal) Collected: 09/13/23 1657    Lab Status: Final result Specimen: Blood from Arm, Right Updated: 09/13/23 1731     BNP 98 pg/mL     Basic metabolic panel [[id number]]  (Abnormal) Collected: 09/13/23 1657    Lab Status: Final result Specimen: Blood from Arm, Right Updated: 09/13/23 1724     Sodium 137 mmol/L      Potassium 4.0 mmol/L      Chloride 95 mmol/L      CO2 36 mmol/L      ANION GAP 6 mmol/L      BUN 12 mg/dL      Creatinine 0.76 mg/dL      Glucose 226 mg/dL      Calcium 8.8 mg/dL      eGFR 83 ml/min/1.73sq m     Narrative:      [place] guidelines for Chronic Kidney Disease (CKD):   •  Stage 1 with normal or high GFR (GFR > 90 mL/min/1.73 square meters)  •  Stage 2 Mild CKD (GFR = 60-89 mL/min/1.73 square meters)  •  Stage 3A Moderate CKD (GFR = 45-59 mL/min/1.73 square meters)  •  Stage 3B Moderate CKD (GFR = 30-44 mL/min/1.73 square meters)  •  Stage 4 Severe CKD (GFR = 15-29 mL/min/1.73 square meters)  •  Stage 5 End Stage CKD (GFR <15 mL/min/1.73 square meters)  Note: GFR calculation is accurate only with a steady state creatinine    CBC and differential [[id number]]  (Abnormal) Collected: 09/13/23 5918    Lab Status: Final result Specimen: Blood from Arm, Right Updated: 09/13/23 1708     WBC 14.69 Thousand/uL      RBC 5.29 Million/uL      Hemoglobin 11.1 g/dL      Hematocrit 38.9 %      MCV 74 fL      MCH 21.0 pg      MCHC 28.5 g/dL      RDW 23.9 %      MPV 11.3 fL      Platelets 614 Thousands/uL      nRBC 0 /100 WBCs      Neutrophils Relative 76 %      Immat GRANS % 1 %      Lymphocytes Relative 15 %      Monocytes Relative 6 %      Eosinophils Relative 2 %      Basophils Relative 0 %      Neutrophils Absolute 11.15 Thousands/µL      Immature Grans Absolute 0.11 Thousand/uL      Lymphocytes Absolute 2.19 Thousands/µL      Monocytes Absolute 0.87 Thousand/µL      Eosinophils Absolute 0.32 Thousand/µL      Basophils Absolute 0.05 Thousands/µL                  X-ray chest 1 view portable   Final Result by Fuentes Hatfield MD (09/13 1654)      No acute cardiopulmonary disease. Workstation performed: [id number]                    Procedures  ECG 12 Lead Documentation Only    Date/Time: 9/13/2023 4:44 PM    Performed by: Bindu Yee PA-C  Authorized by: Bindu Yee PA-C    Indications / Diagnosis:  Shortness of breath  ECG reviewed by me, the ED Provider: yes    Patient location:  ED  Previous ECG:     Previous ECG:  Compared to current    Comparison ECG info:  Compared to 5 September 2023 73 bpm with nonspecific interventricular conduction delay    Comparison to cardiac monitor: Yes    Interpretation:     Interpretation: normal    Rate:     ECG rate:  66    ECG rate assessment: normal    Rhythm:     Rhythm: sinus rhythm    Ectopy:     Ectopy: none    QRS:     QRS axis:  Normal  Conduction:     Conduction: abnormal      Abnormal conduction: non-specific intraventricular conduction delay    ST segments:     ST segments:  Normal  T waves:     T waves: normal    Comments:      Normal EKG with no acute changes. Self interpreted by me.    ECG 12 Lead Documentation Only    Date/Time: 9/13/2023 6:39 PM    Performed by: Edna Sarabia PA-C  Authorized by: Edna Sarabia PA-C    Indications / Diagnosis:  Shortness of breath  ECG reviewed by me, the ED Provider: yes    Patient location:  ED  Previous ECG:     Previous ECG:  Compared to current    Similarity:  No change    Comparison to cardiac monitor: Yes    Interpretation:     Interpretation: normal    Rate:     ECG rate:  71    ECG rate assessment: normal    Rhythm:     Rhythm: sinus rhythm    Ectopy:     Ectopy: none    QRS:     QRS axis:  Normal  Conduction:     Conduction: abnormal      Abnormal conduction: non-specific intraventricular conduction delay    ST segments:     ST segments:  Normal  T waves:     T waves: normal    Comments:      Self interpreted by me. Normal EKG. No changes             ED Course  ED Course as of 09/16/23 0645   Wed Sep 13, 2023   1710 Pending lab work / trialing nebs for consideration for AECOPD. HEART Risk Score    Flowsheet Row Most Recent Value   Heart Score Risk Calculator    History 0 Filed at: 09/13/2023 1700   ECG 1 Filed at: 09/13/2023 1700   Age 1 Filed at: 09/13/2023 1700   Risk Factors 2 Filed at: 09/13/2023 1700   Troponin 0 Filed at: 09/13/2023 1700   HEART Score 4 Filed at: 09/13/2023 1700                        SBIRT 22yo+    Flowsheet Row Most Recent Value   Initial Alcohol Screen: US AUDIT-C     1. How often do you have a drink containing alcohol? 0 Filed at: 09/13/2023 1540   2. How many drinks containing alcohol do you have on a typical day you are drinking? 0 Filed at: 09/13/2023 1540   3a. Male UNDER 65: How often do you have five or more drinks on one occasion? 0 Filed at: 09/13/2023 1540   3b. FEMALE Any Age, or MALE 65+: How often do you have 4 or more drinks on one occassion? 0 Filed at: 09/13/2023 1540   Audit-C Score 0 Filed at: 09/13/2023 1540   JEIMY: How many times in the past year have you. ..     Used an illegal drug or used a prescription medication for non-medical reasons? Never Filed at: 09/13/2023 8210                    Medical Decision Making  Patient felt significantly improved s/p duoneb x 2 and states she has nebs at home. She states she did not take her Bevespi or duonebs at home today d/t being so short of breath. Pt was brought in by neighbor. Non hypoxemic w/ NL cxr. BNP w/in NL range. Improvement in B/L breath sounds w/ wheezes s/p 2 duoneb indicative of AECOPD. Used lower dose steroids d/t DM2 and cautioned patient ton be mindful of glucose increases and likelihood of need for increased insulin coverage. Pt agreeable to plan for steroids / nebs at home and resuming bevespi and addition of Zmax for AECOPD. Discussed return emergency department for any newly developing or worsening signs or symptoms. Patient understood all instructions prior to discharge and plan agreed upon by patient and myself. Chronic respiratory failure with hypoxia [facility]): chronic illness or injury  COPD with acute exacerbation [facility]): acute illness or injury  SOB (shortness of breath): acute illness or injury  Amount and/or Complexity of Data Reviewed  Labs: ordered. Radiology: ordered. Risk  Prescription drug management. Disposition  Final diagnoses:   SOB (shortness of breath)   COPD with acute exacerbation (HCC)   Chronic respiratory failure with hypoxia ([address])     Time reflects when diagnosis was documented in both MDM as applicable and the Disposition within this note     Time User Action Codes Description Comment    9/13/2023  7:48 PM Elfida Dia Add [R06.02] SOB (shortness of breath)     9/13/2023  7:48 PM Elfida Dia Add [J44.1] COPD with acute exacerbation ([address])     9/13/2023  7:49 PM Elfida Dia Add [O01.46] Chronic respiratory failure with hypoxia [facility])       ED Disposition     ED Disposition   Discharge    Condition   Stable    Date/Time   Wed Sep 13, 2023  7:48 PM    Comment   Mattie Joy discharge to home/self care. Follow-up Information     Follow up With Specialties Details Why [address] Char[address] Holliday MD Family Medicine Call today Call today for follow up appointment. [address]            Discharge Medication List as of 9/13/2023  8:05 PM      START taking these medications    Details   azithromycin (ZITHROMAX) 250 mg tablet Take 2 tablets today then 1 tablet daily x 4 days, Normal      predniSONE 20 mg tablet Take 1 tablet (20 mg total) by mouth daily for 5 days, Starting Wed 9/13/2023, Until Mon 9/18/2023, Normal         CONTINUE these medications which have NOT CHANGED    Details   albuterol (2.5 mg/3 mL) 0.083 % nebulizer solution Take 3 mL (2.5 mg total) by nebulization every 6 (six) hours as needed for wheezing or shortness of breath, Starting Thu 6/8/2023, Normal      albuterol (PROVENTIL HFA,VENTOLIN HFA) 90 mcg/act inhaler Inhale 2 puffs every 4 (four) hours as needed for wheezing, Starting Thu 6/8/2023, Normal      apixaban (Eliquis) 5 mg Take 1 tablet (5 mg total) by mouth 2 (two) times a day, Starting Mon 8/7/2023, Normal      atorvastatin (LIPITOR) 40 mg tablet Take 1 tablet (40 mg total) by mouth daily, Starting Wed 3/29/2023, Normal      BD Pen Needle Love 2nd Gen 32G X 4 MM MISC USE DAILY AT BEDTIME USE 4 NEW NEEDLES DAILY . , Starting Wed 9/13/2023, Normal      Benralizumab 30 MG/ML SOAJ Inject 1 mL under the skin every 28 days, Starting Thu 6/8/2023, Normal      Blood Glucose Monitoring Suppl ([employer] Contour Link 2. 4) w/Device KIT Test 3 times daily, Normal      budesonide (PULMICORT) 0.5 mg/2 mL nebulizer solution TAKE 2 ML (0.5 MG TOTAL) BY NEBULIZATION TWICE A DAY RINSE MOUTH AFTER USE, Normal      bumetanide (BUMEX) 2 mg tablet Take 2 tablets (4 mg total) by mouth 2 (two) times a day, Starting Fri 9/8/2023, Until Sun 10/8/2023, Normal      Contour Next Test test strip USE TO TEST BLOOD SUGAR 3 TIMES A DAY, Normal      [employer] Lancets Thin 26G MISC USE 3 (THREE) TIMES A DAY, Normal      EPINEPHrine (EPIPEN) 0.3 mg/0.3 mL SOAJ Inject 0.3 mL (0.3 mg total) into a muscle once for 1 dose, Starting Wed 12/21/2022, Normal      escitalopram (LEXAPRO) 20 mg tablet TAKE 1 TABLET BY MOUTH EVERY DAY, Normal      ferrous sulfate 220 (44 Fe) mg/5 mL solution TAKE 5 ML (220 MG TOTAL) BY MOUTH 2 (TWO) TIMES A DAY BEFORE MEALS, Starting Tue 2/1/2022, Until Wed 9/13/2023, Normal      fluticasone (FLONASE) 50 mcg/act nasal spray 1 spray into each nostril 2 (two) times a day, Starting Wed 9/23/2020, Normal      glycopyrrolate-formoterol (BEVESPI AEROSPHERE) 9-4.8 MCG/ACT inhaler Inhale 2 puffs 2 (two) times a day, Starting Thu 6/8/2023, Normal      insulin glulisine (Apidra SoloStar) 100 units/mL injection pen 24 UNITS BREAKFAST 15 UNITS AT LUNCH AND DINNER, No Print      Lantus SoloStar 100 units/mL SOPN INJECT 0.3 ML (30 UNITS TOTAL) UNDER THE SKIN DAILY AT BEDTIME, Starting Mon 8/14/2023, Normal      levalbuterol (XOPENEX) 1.25 mg/0.5 mL nebulizer solution Take 0.5 mL (1.25 mg total) by nebulization 2 (two) times a day, Starting Mon 11/16/2020, Normal      loratadine (CLARITIN) 10 mg tablet Take by mouth, Historical Med      LORazepam (ATIVAN) 0.5 mg tablet TAKE 2 TABLETS BY MOUTH AT BEDTIME AND 1 EVERY 6 HOURS AS NEEDED FOR ANXIETY, Normal      metFORMIN (GLUCOPHAGE-XR) 500 mg 24 hr tablet TAKE 2 TABLETS BY MOUTH TWICE A DAY WITH FOOD, Normal      metoprolol succinate (TOPROL-XL) 100 mg 24 hr tablet Take 1 tablet (100 mg total) by mouth daily, Starting Wed 3/29/2023, Normal      montelukast (SINGULAIR) 10 mg tablet TAKE 1 TABLET BY MOUTH DAILY AT BEDTIME, Normal      naloxone (NARCAN) 4 mg/0.1 mL nasal spray Administer 1 spray into a nostril.  If breathing does not return to normal or if breathing difficulty resumes after 2-3 minutes, give another dose in the other nostril using a new spray., Normal      omeprazole (PriLOSEC) 40 MG capsule TAKE 1 CAPSULE (40 MG TOTAL) BY MOUTH DAILY. , Starting Sun 6/25/2023, Normal      potassium chloride (K-DUR,KLOR-CON) 20 mEq tablet Take 2 tablets (40 mEq total) by mouth daily Do not start before August 23, 2023., Starting Wed 8/23/2023, Normal      semaglutide, 0.25 or 0.5 mg/dose, (Ozempic, 0.25 or 0.5 MG/DOSE,) 2 mg/3 mL injection pen Inject 0.75 mL (0.5 mg total) under the skin every 7 days, Starting Wed 5/17/2023, Normal      traZODone (DESYREL) 150 mg tablet TAKE 1 TABLET BY MOUTH AT BEDTIME, Starting Thu 7/27/2023, Normal             No discharge procedures on file.     PDMP Review       Value Time User    PDMP Reviewed  Yes 8/23/2023  2:59 PM Thea Li MD          ED Provider  Electronically Signed by           Edna Sarabia PA-C  09/16/23 4527

## 2023-09-13 NOTE — ADDENDUM NOTE
-- Message is from the [facility]--    Provider paged via [employer] Documentation - The below message was copied and pasted from a [employer] page:    7/11/2019 5:31:39 PM   [facility] Center[facility] Stacie Rice   Secure Text   [phone] PATIENT NUMBER -------------------------------- ACC NURSE LINE (IF QUESTIONS ONLY - [phone]) URGENT FROM: TRACIE HOANG 1954. [phone] PLEASE CALL AND FOLLOW UP WITH PATIENT WHO HAS BEEN HAVING ISSUES WITH SWALLOWING FOR A MONTH. SHE STATES THAT SHE COUGHS AND BRINGS UP BLOODY PHLEGM. THANK YOU ACC VANDANA     Addended by: Ad Renee on: 9/13/2023 04:13 PM     Modules accepted: Orders

## 2023-09-13 NOTE — PROGRESS NOTES
Name: Gabi Liu      : 1960      MRN: [id number]  Encounter Provider: Darrin Schlatter, MD  Encounter Date: 2023   Encounter department: [address]     1. Shortness of breath    2. Encounter for screening mammogram for malignant neoplasm of breast  -     Mammo screening bilateral w 3d & cad; Future; Expected date: 2023    3. COPD, severe ([address])    4. Chronic hypercapnic respiratory failure ([address])    5. Chronic diastolic congestive heart failure ([address])       Mattie has worsening shortness of breath, PO2 90% at rest while at 4 L, poor air entry. I am concerned she will continue to decompensate. Sytmpoms have worsened since she left her house for the appointment. Declines 911/ambulance. She will be driven by her friend that brought her to the office to the ER for further evlauation , possible admission. I did briefly discussed care planning. She is a full code and does want aggressive treatment. Her power of  would be Aurora Mohan her daughter. Subjective        TCM Call     Date and time call was made  2023  8:51 AM    Hospital care reviewed  Records reviewed    Patient was hospitialized at  [address] [place] [address]    Date of Admission  23    Date of discharge  23    Diagnosis  Acute on chronic diastolic congestive heart failure    Disposition  Home; Home health services    Were the patients medications reviewed and updated  Yes    Current Symptoms  None    Cough Severity  Mild    Loose stool severity  Severe    Shortness of breath severity  Moderate  Advised to follow-up with   pulmonary    Weakness severity  Moderate    Neausea severity  Moderate    Fatigue severity  Moderate      TCM Call     Post hospital issues  None    Should patient be enrolled in anticoag monitoring? Yes  Eliqis    Scheduled for follow up?   Yes    Patients specialists  Pulmonlolgist    Referrals needed  GI   6 weeks Did you obtain your prescribed medications  Yes    Do you need help managing your prescriptions or medications  No    Is transportation to your appointment needed  No    I have advised the patient to call PCP with any new or worsening symptoms    Cait Chamorro, [address] you have social support  Yes, as much as I need    Are you recieving any outpatient services  No    Are you recieving home care services  No    Types of home care services  Nurse visit    Are you using any community resources  No    Current waiver services  No    Have you fallen in the last 12 months  No    Interperter language line needed  No    Counseling  Patient      Shashi Wray is here for tcm. She was admitted for chf exacerbation . Known severe copd, dm, htn. Has been taking her medications. Her weight at home is 185 pounds which is about where she was on discharge from the hospital.   Her friend Dieudonne Mendoza is with her today. Assisting with transportation. Noted she was confortable when she left her home but has had increase breathing and difficulty breathing since then. No leg swelling. No chest pain. Review of Systems   Constitutional: Positive for activity change. Negative for appetite change, chills and fatigue. Respiratory: Positive for shortness of breath. Cardiovascular: Negative for chest pain, palpitations and leg swelling.        Current Outpatient Medications on File Prior to Visit   Medication Sig   • albuterol (2.5 mg/3 mL) 0.083 % nebulizer solution Take 3 mL (2.5 mg total) by nebulization every 6 (six) hours as needed for wheezing or shortness of breath   • albuterol (PROVENTIL HFA,VENTOLIN HFA) 90 mcg/act inhaler Inhale 2 puffs every 4 (four) hours as needed for wheezing   • apixaban (Eliquis) 5 mg Take 1 tablet (5 mg total) by mouth 2 (two) times a day   • atorvastatin (LIPITOR) 40 mg tablet Take 1 tablet (40 mg total) by mouth daily   • BD Pen Needle Love 2nd Gen 32G X 4 MM MISC USE DAILY AT BEDTIME USE 4 NEW NEEDLES DAILY . • Blood Glucose Monitoring Suppl (Leydi Contour Link 2. 4) w/Device KIT Test 3 times daily   • budesonide (PULMICORT) 0.5 mg/2 mL nebulizer solution TAKE 2 ML (0.5 MG TOTAL) BY NEBULIZATION TWICE A DAY RINSE MOUTH AFTER USE   • bumetanide (BUMEX) 2 mg tablet Take 2 tablets (4 mg total) by mouth 2 (two) times a day   • Contour Next Test test strip USE TO TEST BLOOD SUGAR 3 TIMES A DAY   • [employer] Lancets Thin 26G MISC USE 3 (THREE) TIMES A DAY   • escitalopram (LEXAPRO) 20 mg tablet TAKE 1 TABLET BY MOUTH EVERY DAY   • ferrous sulfate 220 (44 Fe) mg/5 mL solution TAKE 5 ML (220 MG TOTAL) BY MOUTH 2 (TWO) TIMES A DAY BEFORE MEALS   • fluticasone (FLONASE) 50 mcg/act nasal spray 1 spray into each nostril 2 (two) times a day   • glycopyrrolate-formoterol (BEVESPI AEROSPHERE) 9-4.8 MCG/ACT inhaler Inhale 2 puffs 2 (two) times a day   • insulin glulisine (Apidra SoloStar) 100 units/mL injection pen 24 UNITS BREAKFAST 15 UNITS AT LUNCH AND DINNER (Patient taking differently: 30 UNITS BREAKFAST 15 UNITS AT LUNCH AND DINNER)   • Lantus SoloStar 100 units/mL SOPN INJECT 0.3 ML (30 UNITS TOTAL) UNDER THE SKIN DAILY AT BEDTIME   • levalbuterol (XOPENEX) 1.25 mg/0.5 mL nebulizer solution Take 0.5 mL (1.25 mg total) by nebulization 2 (two) times a day   • loratadine (CLARITIN) 10 mg tablet Take by mouth   • LORazepam (ATIVAN) 0.5 mg tablet TAKE 2 TABLETS BY MOUTH AT BEDTIME AND 1 EVERY 6 HOURS AS NEEDED FOR ANXIETY   • metFORMIN (GLUCOPHAGE-XR) 500 mg 24 hr tablet TAKE 2 TABLETS BY MOUTH TWICE A DAY WITH FOOD   • metoprolol succinate (TOPROL-XL) 100 mg 24 hr tablet Take 1 tablet (100 mg total) by mouth daily   • montelukast (SINGULAIR) 10 mg tablet TAKE 1 TABLET BY MOUTH DAILY AT BEDTIME   • naloxone (NARCAN) 4 mg/0.1 mL nasal spray Administer 1 spray into a nostril.  If breathing does not return to normal or if breathing difficulty resumes after 2-3 minutes, give another dose in the other nostril using a new spray. • omeprazole (PriLOSEC) 40 MG capsule TAKE 1 CAPSULE (40 MG TOTAL) BY MOUTH DAILY. • potassium chloride (K-DUR,KLOR-CON) 20 mEq tablet Take 2 tablets (40 mEq total) by mouth daily Do not start before August 23, 2023. • traZODone (DESYREL) 150 mg tablet TAKE 1 TABLET BY MOUTH AT BEDTIME   • Benralizumab 30 MG/ML SOAJ Inject 1 mL under the skin every 28 days (Patient not taking: Reported on 9/13/2023)   • EPINEPHrine (EPIPEN) 0.3 mg/0.3 mL SOAJ Inject 0.3 mL (0.3 mg total) into a muscle once for 1 dose   • semaglutide, 0.25 or 0.5 mg/dose, (Ozempic, 0.25 or 0.5 MG/DOSE,) 2 mg/3 mL injection pen Inject 0.75 mL (0.5 mg total) under the skin every 7 days (Patient not taking: Reported on 9/13/2023)   • [DISCONTINUED] Insulin Pen Needle (BD Pen Needle Love 2nd Gen) 32G X 4 MM MISC Use daily at bedtime Use 4 new needles daily . Objective     /62   Pulse 74   Temp 97.6 °F (36.4 °C)   Ht 5' 3" (1.6 m)   Wt 129 kg (284 lb 3.2 oz)   SpO2 90%   BMI 50.34 kg/m²     Physical Exam  Vitals and nursing note reviewed. Constitutional:       General: She is in acute distress. Appearance: She is well-developed. She is ill-appearing. HENT:      Head: Normocephalic and atraumatic. Right Ear: External ear normal.      Left Ear: External ear normal.      Nose: Nose normal.      Mouth/Throat:      Mouth: Mucous membranes are moist.   Eyes:      Conjunctiva/sclera: Conjunctivae normal.      Pupils: Pupils are equal, round, and reactive to light. Neck:      Thyroid: No thyromegaly. Trachea: No tracheal deviation. Cardiovascular:      Rate and Rhythm: Normal rate and regular rhythm. Pulses: no weak pulses          Dorsalis pedis pulses are 2+ on the right side and 2+ on the left side. Posterior tibial pulses are 2+ on the right side and 2+ on the left side. Heart sounds: Normal heart sounds. No murmur heard.   Pulmonary:      Effort: Pulmonary effort is normal. No respiratory distress. Breath sounds: Normal breath sounds. No wheezing. Abdominal:      General: Bowel sounds are normal.      Palpations: Abdomen is soft. Musculoskeletal:         General: Normal range of motion. Cervical back: Normal range of motion and neck supple. Feet:      Right foot:      Skin integrity: No ulcer, skin breakdown, erythema, warmth, callus or dry skin. Left foot:      Skin integrity: No ulcer, skin breakdown, erythema, warmth, callus or dry skin. Skin:     General: Skin is warm and dry. Capillary Refill: Capillary refill takes less than 2 seconds. Neurological:      General: No focal deficit present. Mental Status: She is alert and oriented to person, place, and time. Diabetic Foot Exam    Patient's shoes and socks removed. Right Foot/Ankle   Right Foot Inspection  Skin Exam: skin normal and skin intact. No dry skin, no warmth, no callus, no erythema, no maceration, no abnormal color, no pre-ulcer, no ulcer and no callus. Toe Exam: ROM and strength within normal limits. Sensory   Vibration: intact  Proprioception: intact  Monofilament testing: intact    Vascular  Capillary refills: < 3 seconds  The right DP pulse is 2+. The right PT pulse is 2+. Left Foot/Ankle  Left Foot Inspection  Skin Exam: skin normal and skin intact. No dry skin, no warmth, no erythema, no maceration, normal color, no pre-ulcer, no ulcer and no callus. Toe Exam: ROM and strength within normal limits. Sensory   Vibration: intact  Proprioception: intact  Monofilament testing: intact    Vascular  Capillary refills: < 3 seconds  The left DP pulse is 2+. The left PT pulse is 2+.      Assign Risk Category  No deformity present  No loss of protective sensation  No weak pulses  Risk: 0    Denver Pou, MD

## 2023-09-14 ENCOUNTER — PATIENT OUTREACH (OUTPATIENT)
Dept: FAMILY MEDICINE CLINIC | Facility: HOSPITAL | Age: 63
End: 2023-09-14

## 2023-09-14 LAB
ATRIAL RATE: 68 BPM
ATRIAL RATE: 71 BPM
P AXIS: 71 DEGREES
P AXIS: 77 DEGREES
PR INTERVAL: 172 MS
PR INTERVAL: 188 MS
QRS AXIS: 70 DEGREES
QRS AXIS: 80 DEGREES
QRSD INTERVAL: 72 MS
QRSD INTERVAL: 74 MS
QT INTERVAL: 400 MS
QT INTERVAL: 410 MS
QTC INTERVAL: 434 MS
QTC INTERVAL: 435 MS
T WAVE AXIS: 70 DEGREES
T WAVE AXIS: 73 DEGREES
VENTRICULAR RATE: 68 BPM
VENTRICULAR RATE: 71 BPM

## 2023-09-14 PROCEDURE — 93010 ELECTROCARDIOGRAM REPORT: CPT | Performed by: INTERNAL MEDICINE

## 2023-09-14 NOTE — PROGRESS NOTES
Outpatient Care Management Note:    ADT alert received that patient was in ER yesterday following her PCP appt. She was experiencing increased shortness of breath on her oxygen. She was started on zithromycin and prednisone. She is to follow up with her PCP. POLO called [facility]. She was just waking up. She states that she is feeling better. The ER gave her a breathing treatment which improved her symptoms. [facility] will have someone  her zithromycin and prednisone prescriptions today. She will continue to monitor her weights and pulse ox. She will call cardiology if her weights are trending upwards per parameters or call pulmonary if her pulse ox is trending below 90%. She knows to seek emergency care for worsening shortness of breath or breathing difficulty. She will call for a PCP follow up appt. Ivy has my contact information and will call with any questions.

## 2023-09-15 ENCOUNTER — TELEPHONE (OUTPATIENT)
Dept: FAMILY MEDICINE CLINIC | Facility: HOSPITAL | Age: 63
End: 2023-09-15

## 2023-09-19 LAB
DME PARACHUTE DELIVERY DATE ACTUAL: NORMAL
DME PARACHUTE DELIVERY DATE EXPECTED: NORMAL
DME PARACHUTE DELIVERY DATE REQUESTED: NORMAL
DME PARACHUTE ITEM DESCRIPTION: NORMAL
DME PARACHUTE ORDER STATUS: NORMAL
DME PARACHUTE SUPPLIER NAME: NORMAL
DME PARACHUTE SUPPLIER PHONE: NORMAL

## 2023-09-20 ENCOUNTER — OFFICE VISIT (OUTPATIENT)
Dept: ENDOCRINOLOGY | Facility: HOSPITAL | Age: 63
End: 2023-09-20
Payer: COMMERCIAL

## 2023-09-20 VITALS
BODY MASS INDEX: 50.14 KG/M2 | WEIGHT: 283 LBS | HEIGHT: 63 IN | DIASTOLIC BLOOD PRESSURE: 76 MMHG | HEART RATE: 83 BPM | SYSTOLIC BLOOD PRESSURE: 116 MMHG | OXYGEN SATURATION: 91 %

## 2023-09-20 DIAGNOSIS — E11.22 TYPE 2 DIABETES MELLITUS WITH STAGE 2 CHRONIC KIDNEY DISEASE, WITH LONG-TERM CURRENT USE OF INSULIN (HCC): Primary | ICD-10-CM

## 2023-09-20 DIAGNOSIS — N18.2 TYPE 2 DIABETES MELLITUS WITH STAGE 2 CHRONIC KIDNEY DISEASE, WITH LONG-TERM CURRENT USE OF INSULIN (HCC): Primary | ICD-10-CM

## 2023-09-20 DIAGNOSIS — Z79.4 TYPE 2 DIABETES MELLITUS WITH STAGE 2 CHRONIC KIDNEY DISEASE, WITH LONG-TERM CURRENT USE OF INSULIN (HCC): Primary | ICD-10-CM

## 2023-09-20 LAB — SL AMB POCT HEMOGLOBIN AIC: 10.7 (ref ?–6.5)

## 2023-09-20 PROCEDURE — 99214 OFFICE O/P EST MOD 30 MIN: CPT | Performed by: PHYSICIAN ASSISTANT

## 2023-09-20 PROCEDURE — 83036 HEMOGLOBIN GLYCOSYLATED A1C: CPT | Performed by: PHYSICIAN ASSISTANT

## 2023-09-20 PROCEDURE — 95251 CONT GLUC MNTR ANALYSIS I&R: CPT | Performed by: PHYSICIAN ASSISTANT

## 2023-09-20 NOTE — PROGRESS NOTES
Wale Fernandes 57 y.o. female MRN: [id number]    Encounter: [id number]      Assessment/Plan     Assessment: This is a 57y.o.-year-old female with type 2 diabetes with neuropathy and hyperlipidemia. Plan:  1. Type 2 diabetes, insulin requiring: Recent hemoglobin A1c was 10.7. He is in for the increase may be steroid use over the past month, and also there was some confusion with picking up her Ozempic after stopping her Victoza. At this time I am not can make any adjustments to her insulin and she will continue with Lantus 30 units daily, Humalog 24 units with breakfast and 12 units with lunch and dinner, and metformin  mg 2 tablets twice a day. She will start Ozempic 0.25 mg weekly for 4 weeks, then increase to 0.5 mg weekly. I would like her to call the office in 2 weeks with glucose. Ultimately would like to see a download but she does have transportation issues. We can make further adjustments to insulin and I do have a feeling we will need to increase her Humalog with breakfast.  Follow-up in 3 months with lab work completed prior to visit.     Patient is type 2 diabetic currently utilizing 4 or more insulin injections a day. Jeremy Wang is checking her blood sugar for more times a day, and adjusting insulin doses according to current glucose levels.  Because of this, it would be beneficial for her to continue utilizing a Dexcom continuous glucose monitor.     2. Diabetic neuropathy:  Stable.  Diabetic foot exam is up-to-date. .     3. History of stage 2 chronic kidney disease:  Kidney functions stable.  Chlorides are low but stable.  Possibly due to COPD.     4. Hyperlipidemia:  Most recent lipid panel looked excellent.  Continue with current medication. Repeat lipid panel prior to next office visit.     CC: Type 2 diabetes follow-up    History of Present Illness     HPI:  Mattie Joy is a 57year old female with type 2 diabetes, insulin requiring for 8 year, hyperlipidemia for follow-up.   She was placed on insulin in June 2021 when her hemoglobin A1c went up markedly. She reportedly did try Januvia and Jardiance in the past but had problems with side effects on these medications. She is on oral agents and insulin at home and takes metformin  mg 2 tablets twice a day, and Lantus insulin 28 units at bedtime, Humalog 20 units with breakfast and 12 units with lunch and dinner plus sliding scale. She was supposed to start Ozempic, but there was issues with getting the medication.  She admits to polyuria, polydipsia, polyphagia, nocturia 3-4 times a night, and blurry vision.  She has unusual sensations of her feet with painful sensations of her feet.  She denies chest pain but has shortness of breath with her lung disease.  She is on oxygen 24 hours a day. She denies retinopathy, heart attack, stroke and claudication but does admit to neuropathy and nephropathy.  Continues to have some depression. Was in the hospital for acute on chronic CHF, and also a COPD exacerbation. Steroids were utilized in the hospital.  She was also seen in the emergency room last week for continued COPD exacerbation and was put on a another steroid taper. She has admitted she is getting snack foods out of the house to minimize her carb intake.     Has followed up with [facility] November 2021.     Hypoglycemic episodes: No never. H/o of hypoglycemia causing hospitalization or intervention such as glucagon injection  or ambulance call  No.  Hypoglycemia symptoms: never had one. Treatment of hypoglycemia: would eat something sweet or sugar.   Glucagon:No.  Medic alert tag: recommended,Yes.      The patient's last eye exam was at June 2022. Nataliya Norman patient's last foot exam was in with podiatry, Dr. Rayne Patel last saw Angelica Selflpine several weeks ago and does every 3 months.  Last diabetic foot exam completed in the office was September 2023.     Blood Sugar/Glucometer/Pump/CGM review: Download of Dexcom from September 7 through September 20, 2023 reveals an average glucose level of 343. She is consistently above target range. Glucose levels are typically lowest overnight, but increase with breakfast and remain high throughout the day until start trending down in late evening. Again she did have frequent steroid use during this timeframe.     She has hyperlipidemia and takes atorvastatin 40 mg daily.  She denies chest pain but has rare palpitations. Review of Systems   Constitutional: Positive for fatigue. Negative for activity change, appetite change and unexpected weight change. HENT: Negative for sore throat and trouble swallowing. Eyes: Negative for visual disturbance. Respiratory: Positive for shortness of breath (On portable oxygen). Negative for chest tightness. Cardiovascular: Negative for chest pain, palpitations and leg swelling. Gastrointestinal: Negative for abdominal pain, constipation, diarrhea, nausea and vomiting. Endocrine: Positive for polydipsia, polyphagia and polyuria. Negative for cold intolerance and heat intolerance. Genitourinary: Negative for frequency. Nocturia   Musculoskeletal: Positive for gait problem (Utilizes wheelchair). Skin: Negative for wound. Neurological: Positive for numbness ( bilateral feet). Negative for dizziness, weakness and headaches. Psychiatric/Behavioral: Positive for dysphoric mood and sleep disturbance. The patient is not nervous/anxious. Historical Information   Past Medical History:   Diagnosis Date   • Acute blood loss anemia 6/1/2021   • Acute diverticulitis 5/2/2021   • Alcohol abuse 5/21/2020   • Anemia    • Atrial fibrillation [facility])    • Cervical radiculopathy    • CHF (congestive heart failure) (HCC)    • Chronic low back pain    • Chronic obstructive asthma ([address]) 2/20/2018   • Cigarette nicotine dependence without complication 07/28/0998    Quit 12/10/2019.     • Community acquired pneumonia 5/21/2020   • Depression with anxiety 3/9/2014   • Diabetes mellitus with hyperglycemia ([address])    • Diverticulitis 2021   • Elevated liver enzymes    • GERD (gastroesophageal reflux disease)    • Hypersomnolence 10/28/2020   • Hypokalemia 2018   • Lower gastrointestinal bleeding 2021   • Paresthesia of upper extremity    • Plantar fasciitis    • Restless legs syndrome 2014   • Sexual dysfunction    • Sleep apnea, obstructive    • Tenosynovitis of finger    • Tinea corporis    • Tobacco use 2018    Currently smoking 3-4 cigarettes daily   • Trigger middle finger of left hand 2017   • Trigger ring finger of left hand 2017   • Weakness of upper extremity      Past Surgical History:   Procedure Laterality Date   • ABDOMINAL SURGERY     • CARPAL TUNNEL RELEASE Bilateral    •  SECTION     • CHOLECYSTECTOMY      laparoscopic   • ESOPHAGOGASTRODUODENOSCOPY N/A 12/3/2018    Procedure: ESOPHAGOGASTRODUODENOSCOPY (EGD) AND COLONOSCOPY;  Surgeon: Marcy Capps MD;  Location: [facility];  Service: Gastroenterology   • GASTRIC BYPASS      for morbid obesity with gilda en y   • HERNIA REPAIR     • HYSTERECTOMY     • OR EXCISION GANGLION WRIST DORSAL/VOLAR PRIMARY Left 2017    Procedure: LONG FINGER GANGLION CYST EXCISION;  Surgeon: Kendall Vanegas MD;  Location: [facility];  Service: Orthopedics   • OR TENDON SHEATH INCISION Left 2017    Procedure: Kathy Mouse, RING, TRIGGER FINGER RELEASE;  Surgeon: Kendall Vanegas MD;  Location: [facility];  Service: Orthopedics   • SHOULDER SURGERY Right      Social History   Social History     Substance and Sexual Activity   Alcohol Use Not Currently   • Alcohol/week: 20.0 standard drinks of alcohol   • Types: 20 Cans of beer per week    Comment: about 6 coors light every night, stopped in 2019     Social History     Substance and Sexual Activity   Drug Use No     Social History     Tobacco Use   Smoking Status Former   • Packs/day: 0.25   • Years: 29.00   • Total pack years: 7.25   • Types: Cigarettes   • Start date: 200   • Quit date: 12/10/2019   • Years since quitting: 3.7   Smokeless Tobacco Never     Family History:   Family History   Problem Relation Age of Onset   • Alzheimer's disease Mother    • Atrial fibrillation Mother    • Dementia Mother    • Heart disease Mother         heart problem   • Seizures Mother    • Parkinsonism Father    • Arthritis Father    • Atrial fibrillation Father    • No Known Problems Daughter    • Cri-du-chat syndrome Daughter    • Colon cancer Maternal Grandmother 80   • Diabetes type II Maternal Grandmother    • No Known Problems Brother    • No Known Problems Son    • Substance Abuse Neg Hx         mother,father   • Mental illness Neg Hx         mother,father   • Colon polyps Neg Hx        Meds/Allergies   Current Outpatient Medications   Medication Sig Dispense Refill   • albuterol (2.5 mg/3 mL) 0.083 % nebulizer solution Take 3 mL (2.5 mg total) by nebulization every 6 (six) hours as needed for wheezing or shortness of breath 1080 mL 3   • albuterol (PROVENTIL HFA,VENTOLIN HFA) 90 mcg/act inhaler Inhale 2 puffs every 4 (four) hours as needed for wheezing 8.5 g 5   • apixaban (Eliquis) 5 mg Take 1 tablet (5 mg total) by mouth 2 (two) times a day 60 tablet 2   • atorvastatin (LIPITOR) 40 mg tablet Take 1 tablet (40 mg total) by mouth daily 90 tablet 3   • BD Pen Needle Love 2nd Gen 32G X 4 MM MISC USE DAILY AT BEDTIME USE 4 NEW NEEDLES DAILY . 400 each 3   • Blood Glucose Monitoring Suppl (Leydi Contour Link 2. 4) w/Device KIT Test 3 times daily 1 kit 0   • budesonide (PULMICORT) 0.5 mg/2 mL nebulizer solution TAKE 2 ML (0.5 MG TOTAL) BY NEBULIZATION TWICE A DAY RINSE MOUTH AFTER USE 60 mL 3   • bumetanide (BUMEX) 2 mg tablet Take 2 tablets (4 mg total) by mouth 2 (two) times a day 120 tablet 0   • Contour Next Test test strip USE TO TEST BLOOD SUGAR 3 TIMES A  strip 3   • [employer] Lancets Thin 26G MISC USE 3 (THREE) TIMES A  each 5 • EPINEPHrine (EPIPEN) 0.3 mg/0.3 mL SOAJ Inject 0.3 mL (0.3 mg total) into a muscle once for 1 dose 0.6 mL 0   • escitalopram (LEXAPRO) 20 mg tablet TAKE 1 TABLET BY MOUTH EVERY DAY 90 tablet 0   • ferrous sulfate 220 (44 Fe) mg/5 mL solution TAKE 5 ML (220 MG TOTAL) BY MOUTH 2 (TWO) TIMES A DAY BEFORE MEALS 473 mL 2   • fluticasone (FLONASE) 50 mcg/act nasal spray 1 spray into each nostril 2 (two) times a day 1 Bottle 3   • glycopyrrolate-formoterol (BEVESPI AEROSPHERE) 9-4.8 MCG/ACT inhaler Inhale 2 puffs 2 (two) times a day 10.7 g 5   • insulin glulisine (Apidra SoloStar) 100 units/mL injection pen 24 UNITS BREAKFAST 15 UNITS AT LUNCH AND DINNER (Patient taking differently: 30 UNITS BREAKFAST 15 UNITS AT LUNCH AND DINNER) 15 mL 0   • Lantus SoloStar 100 units/mL SOPN INJECT 0.3 ML (30 UNITS TOTAL) UNDER THE SKIN DAILY AT BEDTIME 15 mL 3   • levalbuterol (XOPENEX) 1.25 mg/0.5 mL nebulizer solution Take 0.5 mL (1.25 mg total) by nebulization 2 (two) times a day 60 vial 0   • loratadine (CLARITIN) 10 mg tablet Take by mouth     • LORazepam (ATIVAN) 0.5 mg tablet TAKE 2 TABLETS BY MOUTH AT BEDTIME AND 1 EVERY 6 HOURS AS NEEDED FOR ANXIETY 90 tablet 0   • metFORMIN (GLUCOPHAGE-XR) 500 mg 24 hr tablet TAKE 2 TABLETS BY MOUTH TWICE A DAY WITH FOOD 360 tablet 1   • metoprolol succinate (TOPROL-XL) 100 mg 24 hr tablet Take 1 tablet (100 mg total) by mouth daily 90 tablet 3   • montelukast (SINGULAIR) 10 mg tablet TAKE 1 TABLET BY MOUTH DAILY AT BEDTIME 90 tablet 3   • naloxone (NARCAN) 4 mg/0.1 mL nasal spray Administer 1 spray into a nostril. If breathing does not return to normal or if breathing difficulty resumes after 2-3 minutes, give another dose in the other nostril using a new spray. 1 each 1   • omeprazole (PriLOSEC) 40 MG capsule TAKE 1 CAPSULE (40 MG TOTAL) BY MOUTH DAILY.  90 capsule 3   • potassium chloride (K-DUR,KLOR-CON) 20 mEq tablet Take 2 tablets (40 mEq total) by mouth daily Do not start before August 23, 2023. 60 tablet 0   • semaglutide, 0.25 or 0.5 mg/dose, (Ozempic, 0.25 or 0.5 MG/DOSE,) 2 mg/3 mL injection pen Inject 0.375 mL (0.25 mg total) under the skin every 7 days Increase to 0.5 mg after 4 weeks. 3 mL 5   • traZODone (DESYREL) 150 mg tablet TAKE 1 TABLET BY MOUTH AT BEDTIME 90 tablet 0   • Benralizumab 30 MG/ML SOAJ Inject 1 mL under the skin every 28 days (Patient not taking: Reported on 9/13/2023) 1 mL 11     No current facility-administered medications for this visit. Allergies   Allergen Reactions   • Iron Dextran Swelling   • Januvia [Sitagliptin] Shortness Of Breath   • Jardiance [Empagliflozin] Shortness Of Breath   • Clonazepam Other (See Comments)     Unknown reaction   • Codeine Itching and Other (See Comments)     itch;mary kay morphine,takes ultram @home   • Adhesive [Medical Tape] Itching     itching   • Effexor [Venlafaxine] Itching   • Lactose - Food Allergy GI Intolerance   • Oxycodone-Acetaminophen Anxiety   • Oxycodone-Acetaminophen Itching and Rash     Other reaction(s): Other (See Comments)  (PERCOCET) MILD RASH, not allergic to Acetaminophen        Objective   Vitals: Blood pressure 116/76, pulse 83, height 5' 3" (1.6 m), weight 128 kg (283 lb), SpO2 91 %, not currently breastfeeding. Physical Exam  Vitals and nursing note reviewed. Constitutional:       General: She is not in acute distress. Appearance: Normal appearance. She is not diaphoretic. HENT:      Head: Normocephalic and atraumatic. Eyes:      General: No scleral icterus. Extraocular Movements: Extraocular movements intact. Conjunctiva/sclera: Conjunctivae normal.      Pupils: Pupils are equal, round, and reactive to light. Cardiovascular:      Rate and Rhythm: Normal rate and regular rhythm. Heart sounds: No murmur heard. Pulmonary:      Effort: Pulmonary effort is normal. No respiratory distress. Breath sounds: Normal breath sounds. No wheezing.    Musculoskeletal:      Cervical back: Normal range of motion. Right lower leg: No edema. Left lower leg: No edema. Lymphadenopathy:      Cervical: No cervical adenopathy. Neurological:      Mental Status: She is alert and oriented to person, place, and time. Mental status is at baseline. Sensory: No sensory deficit. Gait: Gait normal.   Psychiatric:         Mood and Affect: Mood normal.         Behavior: Behavior normal.         Thought Content: Thought content normal.         The history was obtained from the review of the chart, patient.     Lab Results:   Lab Results   Component Value Date/Time    Hemoglobin A1C 10.7 (A) 09/20/2023 02:46 PM    Hemoglobin A1C 9.9 (H) 05/11/2023 10:05 AM    Hemoglobin A1C 9.9 (H) 02/06/2023 11:38 AM    Hemoglobin A1C 9.5 (H) 10/25/2022 10:25 AM    WBC 14.69 (H) 09/13/2023 04:57 PM    WBC 12.22 (H) 09/08/2023 05:37 AM    WBC 12.81 (H) 09/07/2023 05:25 AM    Hemoglobin 11.1 (L) 09/13/2023 04:57 PM    Hemoglobin 9.6 (L) 09/08/2023 05:37 AM    Hemoglobin 9.5 (L) 09/07/2023 05:25 AM    Hematocrit 38.9 09/13/2023 04:57 PM    Hematocrit 33.7 (L) 09/08/2023 05:37 AM    Hematocrit 33.9 (L) 09/07/2023 05:25 AM    MCV 74 (L) 09/13/2023 04:57 PM    MCV 74 (L) 09/08/2023 05:37 AM    MCV 75 (L) 09/07/2023 05:25 AM    Platelets 833 (H) 17/01/4981 04:57 PM    Platelets 774 40/50/1647 05:37 AM    Platelets 112 68/63/0399 05:25 AM    BUN 12 09/13/2023 04:57 PM    BUN 17 09/08/2023 05:37 AM    BUN 19 09/07/2023 05:25 AM    BUN 15 05/11/2023 10:05 AM    BUN 11 02/06/2023 11:38 AM    BUN 10 10/25/2022 10:25 AM    Potassium 4.0 09/13/2023 04:57 PM    Potassium 3.6 09/08/2023 05:37 AM    Potassium 3.8 09/07/2023 05:25 AM    Potassium 4.5 05/11/2023 10:05 AM    Potassium 4.2 02/06/2023 11:38 AM    Potassium 4.6 10/25/2022 10:25 AM    Chloride 95 (L) 09/13/2023 04:57 PM    Chloride 93 (L) 09/08/2023 05:37 AM    Chloride 93 (L) 09/07/2023 05:25 AM    Chloride 91 (L) 05/11/2023 10:05 AM    Chloride 92 (L) 02/06/2023 11:38 AM    Chloride 93 (L) 10/25/2022 10:25 AM    CO2 36 (H) 09/13/2023 04:57 PM    CO2 40 (H) 09/08/2023 05:37 AM    CO2 39 (H) 09/07/2023 05:25 AM    CO2 31 (H) 05/11/2023 10:05 AM    CO2 30 (H) 02/06/2023 11:38 AM    CO2 32 (H) 10/25/2022 10:25 AM    CO2, i-STAT 38 (H) 09/05/2023 03:11 PM    CO2, i-STAT 44 (H) 08/11/2023 05:46 PM    CO2, i-STAT 38 (H) 07/22/2023 09:08 PM    Creatinine 0.76 09/13/2023 04:57 PM    Creatinine 0.47 (L) 09/08/2023 05:37 AM    Creatinine 0.49 (L) 09/07/2023 05:25 AM    AST 18 09/06/2023 05:04 AM    AST 19 09/05/2023 03:14 PM    AST 8 (L) 08/13/2023 05:13 AM    AST 20 05/11/2023 10:05 AM    AST 27 02/06/2023 11:38 AM    AST 31 10/25/2022 10:25 AM    ALT 26 09/06/2023 05:04 AM    ALT 21 09/05/2023 03:14 PM    ALT 14 08/13/2023 05:13 AM    ALT 21 05/11/2023 10:05 AM    ALT 42 (H) 02/06/2023 11:38 AM    ALT 45 (H) 10/25/2022 10:25 AM    Total Protein 6.6 09/06/2023 05:04 AM    Total Protein 6.9 09/05/2023 03:14 PM    Total Protein 6.2 (L) 08/13/2023 05:13 AM    Protein, Total 6.4 05/11/2023 10:05 AM    Protein, Total 6.5 02/06/2023 11:38 AM    Protein, Total 6.4 10/25/2022 10:25 AM    Albumin 4.1 09/06/2023 05:04 AM    Albumin 4.1 09/05/2023 03:14 PM    Albumin 3.9 08/13/2023 05:13 AM    Globulin, Total 2.1 05/11/2023 10:05 AM    Globulin, Total 2.3 02/06/2023 11:38 AM    Globulin, Total 2.1 10/25/2022 10:25 AM    HDL 48 10/25/2022 10:25 AM    Triglycerides 126 10/25/2022 10:25 AM       Portions of the record may have been created with voice recognition software. Occasional wrong word or "sound a like" substitutions may have occurred due to the inherent limitations of voice recognition software. Read the chart carefully and recognize, using context, where substitutions have occurred.

## 2023-09-20 NOTE — PATIENT INSTRUCTIONS
Monitor diet. Make sure to drink plenty water throughout the day. Continue metformin 1000 mg twice a day, and glimepiride 8 mg with breakfast.     Continue Lantus to 30 units daily. Continue Apidra  24 units with breakfast, and 12 units at lunch and dinner. Start Ozempic. Call in blood sugars in about 2 weeks so we can make adjustments. Contact the office with any concerns or questions. Follow-up in 3 months with lab work completed prior to visit.

## 2023-09-21 ENCOUNTER — PATIENT OUTREACH (OUTPATIENT)
Dept: FAMILY MEDICINE CLINIC | Facility: HOSPITAL | Age: 63
End: 2023-09-21

## 2023-09-21 NOTE — PROGRESS NOTES
Outpatient Care Management Note:    Care manager called Ivy. She states that she is doing a little better. She finished her antibiotics and steroid taper. She is using her oxygen and her pulse ox has been averaging around 92%. She is aware that she needs to schedule her follow up appt with pulmonary. Ivy saw endocrine yesterday. She will have someone  her ozempic from the pharmacy. She knows that she is to send her blood sugars to endocrine in 2 weeks. Ivy is scheduled with cardiology on 9/26. She needs blood work completed prior. She will get it completed. We also discussed that she needs to schedule a podiatry appt.

## 2023-09-25 ENCOUNTER — DOCUMENTATION (OUTPATIENT)
Dept: ENDOCRINOLOGY | Facility: HOSPITAL | Age: 63
End: 2023-09-25

## 2023-10-04 ENCOUNTER — TELEPHONE (OUTPATIENT)
Dept: FAMILY MEDICINE CLINIC | Facility: HOSPITAL | Age: 63
End: 2023-10-04

## 2023-10-04 NOTE — TELEPHONE ENCOUNTER
Per voicemail @ [address] [place] [address], my name is Anjel Grijalva, I'm a  with [employer]. First, we're just required to inform PCP offices when one of their patients has a collaboration completed. We, the medical insurance company, did a collaboration with one of your patients behavioral health managed care companies. The patient is named "[employer]. Her YOB: 1960. She told me she sees a doctor, Kaci Chacko, at your office. We did this collaboration today and it can be viewed in our [employer] system. If the doctor wants to check it out. We are also going to mail you a copy of the collaboration for your records. Again, this is just an informative voicemail I'm leaving you. If you have any other questions about this, feel free to call me back. I'm at [phone] in the letter that's being mailed to you about this collaboration. It'll have my phone number on here and a behavioral health , our  from [facility], on there also in case the doctor has any questions. Thank you very much.

## 2023-10-05 ENCOUNTER — DOCUMENTATION (OUTPATIENT)
Dept: ENDOCRINOLOGY | Facility: HOSPITAL | Age: 63
End: 2023-10-05

## 2023-10-06 ENCOUNTER — PATIENT OUTREACH (OUTPATIENT)
Dept: CASE MANAGEMENT | Facility: HOSPITAL | Age: 63
End: 2023-10-06

## 2023-10-06 NOTE — PROGRESS NOTES
Pt called for follow up care management. Fei Smith states that she is doing ok. She feels like she always has some SOB but not more than usual. Reports that she has been checking her SpO2 and readings have been good. This CM asked Mattie about injections for Benralizumab from pulmonologist. She states that she knows pulmonology has been trying to call and she will call them back today. We spoke about Ozempic. She states that she has not received yet however she will be picking it up soon. We also spoke about making a podiatry appointment. She states she will also make this appointment. Confirmed with Fei Smith she has contact information for these offices to schedule. Plan for follow up call in couple weeks time to ensure she has appointments and breathing continues at baseline, she is agreeable. Sarah Aiken RN CM on care team and note routed to same for follow up.

## 2023-10-10 ENCOUNTER — TELEPHONE (OUTPATIENT)
Dept: CARDIOLOGY CLINIC | Facility: CLINIC | Age: 63
End: 2023-10-10

## 2023-10-11 DIAGNOSIS — I50.9 ACUTE EXACERBATION OF CHF (CONGESTIVE HEART FAILURE) (HCC): ICD-10-CM

## 2023-10-11 RX ORDER — BUMETANIDE 2 MG/1
4 TABLET ORAL 2 TIMES DAILY
Qty: 120 TABLET | Refills: 0 | Status: SHIPPED | OUTPATIENT
Start: 2023-10-11 | End: 2023-11-10

## 2023-10-19 ENCOUNTER — HOSPITAL ENCOUNTER (OUTPATIENT)
Dept: INFUSION CENTER | Facility: HOSPITAL | Age: 63
Discharge: HOME | End: 2023-10-19
Attending: INTERNAL MEDICINE

## 2023-10-19 ENCOUNTER — TELEPHONE (OUTPATIENT)
Dept: PULMONOLOGY | Facility: CLINIC | Age: 63
End: 2023-10-19

## 2023-10-19 VITALS
RESPIRATION RATE: 18 BRPM | TEMPERATURE: 97.6 F | DIASTOLIC BLOOD PRESSURE: 76 MMHG | SYSTOLIC BLOOD PRESSURE: 130 MMHG | HEART RATE: 106 BPM | OXYGEN SATURATION: 91 %

## 2023-10-19 DIAGNOSIS — J44.89 ASTHMA-COPD OVERLAP SYNDROME: ICD-10-CM

## 2023-10-19 DIAGNOSIS — J82.83 EOSINOPHILIC ASTHMA: ICD-10-CM

## 2023-10-19 DIAGNOSIS — J82.83 EOSINOPHILIC ASTHMA: Primary | ICD-10-CM

## 2023-10-19 RX ORDER — EPINEPHRINE 1 MG/ML
0.3 INJECTION, SOLUTION, CONCENTRATE INTRAVENOUS
Status: CANCELLED | OUTPATIENT
Start: 2023-11-16

## 2023-10-19 RX ORDER — EPINEPHRINE 1 MG/ML
0.3 INJECTION, SOLUTION, CONCENTRATE INTRAVENOUS
Status: DISCONTINUED | OUTPATIENT
Start: 2023-10-19 | End: 2023-10-22 | Stop reason: HOSPADM

## 2023-10-19 RX ORDER — EPINEPHRINE 1 MG/ML
0.3 INJECTION, SOLUTION, CONCENTRATE INTRAVENOUS
OUTPATIENT
Start: 2023-11-16

## 2023-10-19 RX ORDER — EPINEPHRINE 0.3 MG/.3ML
0.3 INJECTION SUBCUTANEOUS ONCE
Qty: 0.6 ML | Refills: 0 | Status: SHIPPED | OUTPATIENT
Start: 2023-10-19 | End: 2023-10-19

## 2023-10-19 NOTE — TELEPHONE ENCOUNTER
Pt calling from infusion center stating she does not have epi pen and needs prescription sent to [facility].

## 2023-10-19 NOTE — PROGRESS NOTES
Pt arrived for [facility] with no epi pen. Rescheduled for Tuesday at 3:00. Left unit via WC accompanied by friend.

## 2023-10-20 ENCOUNTER — PATIENT OUTREACH (OUTPATIENT)
Dept: FAMILY MEDICINE CLINIC | Facility: HOSPITAL | Age: 63
End: 2023-10-20

## 2023-10-20 NOTE — PROGRESS NOTES
Outpatient Care Management Note:    Care manager called Ivy. She states that her breathing has been at her baseline. She is using her oxygen at 4 L. Her pulse ox has been averaging around 92%. Ivy does not check daily weights every day. She states that it has been around 283 lbs, but admits she did not check it today. POLO reinforced the importance of her checking weights as she was hospitalized in September with CHF. We reveiwed that she needs to schedule appts with pulmonary, cardiology, and podiatry. Ivy states her blood sugar this morning was 200. After eating breakfast, it ladi to 348. She has not sent her blood sugars to endocrine as instructed. They were due around 10/4. Ivy states that she does not have many sugars readings, because her dexcom was not working for 3 weeks. It is back functioning now. She also has not received the ozempic. She states that they were waiting on insurance authorization. POLO called [facility]. I was on hold for 11 minutes. POLO will attempt to call another day.

## 2023-10-23 ENCOUNTER — PATIENT OUTREACH (OUTPATIENT)
Dept: FAMILY MEDICINE CLINIC | Facility: HOSPITAL | Age: 63
End: 2023-10-23

## 2023-10-23 DIAGNOSIS — J82.83 EOSINOPHILIC ASTHMA: Primary | ICD-10-CM

## 2023-10-23 RX ORDER — EPINEPHRINE 1 MG/ML
0.3 INJECTION, SOLUTION, CONCENTRATE INTRAVENOUS
Status: CANCELLED | OUTPATIENT
Start: 2023-10-24

## 2023-10-23 NOTE — PROGRESS NOTES
Outpatient Care Management Note:    RN care manager called [facility]. The pharmacist re ran her ozempic prescription and it was covered for 28 days. They have it in stock and patient can pick it up in 1-2 hours. Care manager called Ivy and updated her on above. She will pick it up.   She is aware to call endocrine with any questions related to the ozempic

## 2023-10-24 ENCOUNTER — HOSPITAL ENCOUNTER (OUTPATIENT)
Dept: INFUSION CENTER | Facility: HOSPITAL | Age: 63
Discharge: HOME | End: 2023-10-24
Attending: INTERNAL MEDICINE

## 2023-10-24 DIAGNOSIS — G47.00 INSOMNIA, UNSPECIFIED TYPE: ICD-10-CM

## 2023-10-24 RX ORDER — TRAZODONE HYDROCHLORIDE 150 MG/1
150 TABLET ORAL
Qty: 90 TABLET | Refills: 0 | Status: SHIPPED | OUTPATIENT
Start: 2023-10-24

## 2023-10-24 RX ORDER — EPINEPHRINE 1 MG/ML
0.3 INJECTION, SOLUTION, CONCENTRATE INTRAVENOUS
Status: CANCELLED | OUTPATIENT
Start: 2023-10-31

## 2023-10-29 DIAGNOSIS — E11.65 TYPE 2 DIABETES MELLITUS WITH HYPERGLYCEMIA, UNSPECIFIED WHETHER LONG TERM INSULIN USE (HCC): ICD-10-CM

## 2023-10-29 RX ORDER — PERPHENAZINE 16 MG/1
TABLET, FILM COATED ORAL
Qty: 100 STRIP | Refills: 3 | Status: SHIPPED | OUTPATIENT
Start: 2023-10-29

## 2023-10-30 ENCOUNTER — HOSPITAL ENCOUNTER (EMERGENCY)
Facility: HOSPITAL | Age: 63
Discharge: HOME | End: 2023-10-30
Attending: EMERGENCY MEDICINE
Payer: COMMERCIAL

## 2023-10-30 ENCOUNTER — APPOINTMENT (EMERGENCY)
Dept: RADIOLOGY | Facility: HOSPITAL | Age: 63
End: 2023-10-30
Payer: COMMERCIAL

## 2023-10-30 VITALS
TEMPERATURE: 97.8 F | DIASTOLIC BLOOD PRESSURE: 64 MMHG | OXYGEN SATURATION: 91 % | RESPIRATION RATE: 19 BRPM | SYSTOLIC BLOOD PRESSURE: 129 MMHG | HEART RATE: 78 BPM

## 2023-10-30 DIAGNOSIS — J44.1 COPD EXACERBATION (HCC): Primary | ICD-10-CM

## 2023-10-30 DIAGNOSIS — E11.65 TYPE 2 DIABETES MELLITUS WITH HYPERGLYCEMIA, UNSPECIFIED WHETHER LONG TERM INSULIN USE (HCC): ICD-10-CM

## 2023-10-30 DIAGNOSIS — J44.89 ASTHMA-COPD OVERLAP SYNDROME: ICD-10-CM

## 2023-10-30 DIAGNOSIS — I50.9 CHF EXACERBATION (HCC): ICD-10-CM

## 2023-10-30 LAB
2HR DELTA HS TROPONIN: 0 NG/L
ALBUMIN SERPL BCP-MCNC: 4.2 G/DL (ref 3.5–5)
ALP SERPL-CCNC: 123 U/L (ref 34–104)
ALT SERPL W P-5'-P-CCNC: 20 U/L (ref 7–52)
ANION GAP SERPL CALCULATED.3IONS-SCNC: 8 MMOL/L
AST SERPL W P-5'-P-CCNC: 39 U/L (ref 13–39)
ATRIAL RATE: 84 BPM
BASOPHILS # BLD AUTO: 0.07 THOUSANDS/ÂΜL (ref 0–0.1)
BASOPHILS NFR BLD AUTO: 1 % (ref 0–1)
BILIRUB SERPL-MCNC: 0.45 MG/DL (ref 0.2–1)
BNP SERPL-MCNC: 282 PG/ML (ref 0–100)
BUN SERPL-MCNC: 10 MG/DL (ref 5–25)
CALCIUM SERPL-MCNC: 9 MG/DL (ref 8.4–10.2)
CARDIAC TROPONIN I PNL SERPL HS: 4 NG/L
CARDIAC TROPONIN I PNL SERPL HS: 4 NG/L
CHLORIDE SERPL-SCNC: 93 MMOL/L (ref 96–108)
CO2 SERPL-SCNC: 34 MMOL/L (ref 21–32)
CREAT SERPL-MCNC: 0.62 MG/DL (ref 0.6–1.3)
EOSINOPHIL # BLD AUTO: 0.21 THOUSAND/ÂΜL (ref 0–0.61)
EOSINOPHIL NFR BLD AUTO: 2 % (ref 0–6)
ERYTHROCYTE [DISTWIDTH] IN BLOOD BY AUTOMATED COUNT: 21 % (ref 11.6–15.1)
GFR SERPL CREATININE-BSD FRML MDRD: 95 ML/MIN/1.73SQ M
GLUCOSE SERPL-MCNC: 250 MG/DL (ref 65–140)
HCT VFR BLD AUTO: 36.7 % (ref 34.8–46.1)
HGB BLD-MCNC: 10.6 G/DL (ref 11.5–15.4)
IMM GRANULOCYTES # BLD AUTO: 0.05 THOUSAND/UL (ref 0–0.2)
IMM GRANULOCYTES NFR BLD AUTO: 0 % (ref 0–2)
LYMPHOCYTES # BLD AUTO: 1.34 THOUSANDS/ÂΜL (ref 0.6–4.47)
LYMPHOCYTES NFR BLD AUTO: 10 % (ref 14–44)
MCH RBC QN AUTO: 21.6 PG (ref 26.8–34.3)
MCHC RBC AUTO-ENTMCNC: 28.9 G/DL (ref 31.4–37.4)
MCV RBC AUTO: 75 FL (ref 82–98)
MONOCYTES # BLD AUTO: 0.63 THOUSAND/ÂΜL (ref 0.17–1.22)
MONOCYTES NFR BLD AUTO: 5 % (ref 4–12)
NEUTROPHILS # BLD AUTO: 10.53 THOUSANDS/ÂΜL (ref 1.85–7.62)
NEUTS SEG NFR BLD AUTO: 82 % (ref 43–75)
NRBC BLD AUTO-RTO: 0 /100 WBCS
P AXIS: 80 DEGREES
PLATELET # BLD AUTO: 201 THOUSANDS/UL (ref 149–390)
POTASSIUM SERPL-SCNC: 4.3 MMOL/L (ref 3.5–5.3)
PR INTERVAL: 188 MS
PROT SERPL-MCNC: 7 G/DL (ref 6.4–8.4)
QRS AXIS: 68 DEGREES
QRSD INTERVAL: 76 MS
QT INTERVAL: 390 MS
QTC INTERVAL: 460 MS
RBC # BLD AUTO: 4.9 MILLION/UL (ref 3.81–5.12)
SODIUM SERPL-SCNC: 135 MMOL/L (ref 135–147)
T WAVE AXIS: 76 DEGREES
VENTRICULAR RATE: 84 BPM
WBC # BLD AUTO: 12.83 THOUSAND/UL (ref 4.31–10.16)

## 2023-10-30 PROCEDURE — 93010 ELECTROCARDIOGRAM REPORT: CPT | Performed by: INTERNAL MEDICINE

## 2023-10-30 PROCEDURE — 99285 EMERGENCY DEPT VISIT HI MDM: CPT | Performed by: EMERGENCY MEDICINE

## 2023-10-30 PROCEDURE — 96374 THER/PROPH/DIAG INJ IV PUSH: CPT

## 2023-10-30 PROCEDURE — 36415 COLL VENOUS BLD VENIPUNCTURE: CPT | Performed by: EMERGENCY MEDICINE

## 2023-10-30 PROCEDURE — 99285 EMERGENCY DEPT VISIT HI MDM: CPT

## 2023-10-30 PROCEDURE — 93005 ELECTROCARDIOGRAM TRACING: CPT

## 2023-10-30 PROCEDURE — 84484 ASSAY OF TROPONIN QUANT: CPT | Performed by: EMERGENCY MEDICINE

## 2023-10-30 PROCEDURE — 80053 COMPREHEN METABOLIC PANEL: CPT | Performed by: EMERGENCY MEDICINE

## 2023-10-30 PROCEDURE — 71045 X-RAY EXAM CHEST 1 VIEW: CPT

## 2023-10-30 PROCEDURE — 85025 COMPLETE CBC W/AUTO DIFF WBC: CPT | Performed by: EMERGENCY MEDICINE

## 2023-10-30 PROCEDURE — 83880 ASSAY OF NATRIURETIC PEPTIDE: CPT | Performed by: EMERGENCY MEDICINE

## 2023-10-30 RX ORDER — IPRATROPIUM BROMIDE AND ALBUTEROL SULFATE 2.5; .5 MG/3ML; MG/3ML
3 SOLUTION RESPIRATORY (INHALATION) ONCE
Status: COMPLETED | OUTPATIENT
Start: 2023-10-30 | End: 2023-10-30

## 2023-10-30 RX ORDER — PREDNISONE 20 MG/1
40 TABLET ORAL DAILY
Qty: 8 TABLET | Refills: 0 | Status: SHIPPED | OUTPATIENT
Start: 2023-10-31 | End: 2023-11-04

## 2023-10-30 RX ORDER — BUMETANIDE 0.25 MG/ML
1 INJECTION INTRAMUSCULAR; INTRAVENOUS ONCE
Status: COMPLETED | OUTPATIENT
Start: 2023-10-30 | End: 2023-10-30

## 2023-10-30 RX ORDER — BLOOD-GLUCOSE METER
EACH MISCELLANEOUS
Qty: 1 EACH | Refills: 0 | Status: SHIPPED | OUTPATIENT
Start: 2023-10-30

## 2023-10-30 RX ORDER — PREDNISONE 20 MG/1
40 TABLET ORAL ONCE
Status: COMPLETED | OUTPATIENT
Start: 2023-10-30 | End: 2023-10-30

## 2023-10-30 RX ORDER — BUDESONIDE 0.5 MG/2ML
INHALANT ORAL
Qty: 60 ML | Refills: 3 | Status: SHIPPED | OUTPATIENT
Start: 2023-10-30

## 2023-10-30 RX ADMIN — BUMETANIDE 1 MG: 0.25 INJECTION INTRAMUSCULAR; INTRAVENOUS at 16:22

## 2023-10-30 RX ADMIN — IPRATROPIUM BROMIDE AND ALBUTEROL SULFATE 3 ML: 2.5; .5 SOLUTION RESPIRATORY (INHALATION) at 15:14

## 2023-10-30 RX ADMIN — PREDNISONE 40 MG: 20 TABLET ORAL at 18:10

## 2023-10-30 NOTE — ED CARE HANDOFF
Emergency Department Sign Out Note        Sign out and transfer of care from Dr. Ely Jones. See Separate Emergency Department note. The patient, Donovan Bangura, was evaluated by the previous provider for palpitations. Workup Completed:  Labs unremarkable with the exception of elevated BNP. CXR with pulmonary vascular congestion similar to prior. Received neb treatment for possible element of COPD. Repeat troponin pending. No increase in oxygen demand. ED Course / Workup Pending (followup):  IV bumex ordered for possible element of CHF exacerbation while awaiting repeat trop. Repeat trop unchanged. Symptoms improved. Prednisone burst with first dose given in ED for component of COPD exacerbation. ED Course as of 10/30/23 1804   Mon Oct 30, 2023   1800 SpO2: 91 %  Symptoms improved on re-evaluation. Patient noted to be on 3.5 L NC. Patient on 4 L NC at baseline. Increased to 4 L. Will discharge on prednisone burst.     Procedures  Medical Decision Making  Amount and/or Complexity of Data Reviewed  Labs: ordered. Radiology: ordered and independent interpretation performed. Risk  Prescription drug management. Disposition  Final diagnoses:   COPD exacerbation ([address])   CHF exacerbation ([address])     Time reflects when diagnosis was documented in both MDM as applicable and the Disposition within this note       Time User Action Codes Description Comment    10/30/2023  6:01 PM Betina Wiggins Add [J44.1] COPD exacerbation ([address])     10/30/2023  6:02 PM Jacob MARTINEZ Add [I50.9] CHF exacerbation [facility])           ED Disposition       ED Disposition   Discharge    Condition   Stable    Date/Time   Mon Oct 30, 2023  6:02 PM    Comment   Mattie Joy discharge to home/self care.                    Follow-up Information       Follow up With Specialties Details Why Contact Info Additional Information    Coy Goyal MD Family Medicine Schedule an appointment as soon as possible for a visit in 3 days for re-evaluation [facility]  [address] [facility] [address] Emergency Department Emergency Medicine Go to  If symptoms worsen [address] [facility] [id number]  [address] [facility] Emergency Department, [address] [facility], [address] [facility], [address] [facility]          Patient's Medications   Discharge Prescriptions    PREDNISONE 20 MG TABLET    Take 2 tablets (40 mg total) by mouth daily for 4 days Do not start before October 31, 2023. Start Date: 10/31/2023End Date: 11/4/2023       Order Dose: 40 mg       Quantity: 8 tablet    Refills: 0     No discharge procedures on file.        ED Provider  Electronically Signed by     Young Vazquez MD  10/30/23 7717

## 2023-10-30 NOTE — ED PROVIDER NOTES
History  Chief Complaint   Patient presents with    Palpitations     Pt to er via ems from home with reports of feeling palpitations for the past couple days. Also reports that the past couple days when she talks on the phone, she get sob      Patient is a 57year old female who presents with palpitations and shortness of breath. Patient reports that she is always short of breath, but over the last few days, she thinks it is slightly worse and occasionally she will get a chest tightness and palpitations that go away on its own. Patient states that she has been taking her medications as prescribed, but she is not measuring her oxygen levels. She did not feel like her shortness of breath was bad enough to require an increase in her oxygen. She states that she is unsure if her legs are becoming more swollen or if she is gaining weight as she has not been weighing herself. Prior to Admission Medications   Prescriptions Last Dose Informant Patient Reported? Taking? BD Pen Needle Love 2nd Gen 32G X 4 MM MISC  Self No No   Sig: USE DAILY AT BEDTIME USE 4 NEW NEEDLES DAILY .    Benralizumab 30 MG/ML SOAJ  Self No No   Sig: Inject 1 mL under the skin every 28 days   Patient not taking: Reported on 9/13/2023   Blood Glucose Monitoring Suppl (Contour Next One) AILYN   No No   Sig: USE AS DIRECTED 3 TIMES A DAY   [employer] Lancets Thin 26G MISC  Self No No   Sig: USE 3 (THREE) TIMES A DAY   Contour Next Test test strip   No No   Sig: USE TO TEST BLOOD SUGAR 3 TIMES A DAY   EPINEPHrine (EPIPEN) 0.3 mg/0.3 mL SOAJ   No No   Sig: Inject 0.3 mL (0.3 mg total) into a muscle once for 1 dose   LORazepam (ATIVAN) 0.5 mg tablet  Self No No   Sig: TAKE 2 TABLETS BY MOUTH AT BEDTIME AND 1 EVERY 6 HOURS AS NEEDED FOR ANXIETY   Lantus SoloStar 100 units/mL SOPN  Self No No   Sig: INJECT 0.3 ML (30 UNITS TOTAL) UNDER THE SKIN DAILY AT BEDTIME   albuterol (2.5 mg/3 mL) 0.083 % nebulizer solution  Self No No   Sig: Take 3 mL (2.5 mg total) by nebulization every 6 (six) hours as needed for wheezing or shortness of breath   albuterol (PROVENTIL HFA,VENTOLIN HFA) 90 mcg/act inhaler  Self No No   Sig: Inhale 2 puffs every 4 (four) hours as needed for wheezing   apixaban (Eliquis) 5 mg  Self No No   Sig: Take 1 tablet (5 mg total) by mouth 2 (two) times a day   atorvastatin (LIPITOR) 40 mg tablet  Self No No   Sig: Take 1 tablet (40 mg total) by mouth daily   budesonide (PULMICORT) 0.5 mg/2 mL nebulizer solution   No No   Sig: TAKE 2 ML (0.5 MG TOTAL) BY NEBULIZATION TWICE A DAY RINSE MOUTH AFTER USE   bumetanide (BUMEX) 2 mg tablet   No No   Sig: Take 2 tablets (4 mg total) by mouth 2 (two) times a day   escitalopram (LEXAPRO) 20 mg tablet  Self No No   Sig: TAKE 1 TABLET BY MOUTH EVERY DAY   ferrous sulfate 220 (44 Fe) mg/5 mL solution  Self No No   Sig: TAKE 5 ML (220 MG TOTAL) BY MOUTH 2 (TWO) TIMES A DAY BEFORE MEALS   fluticasone (FLONASE) 50 mcg/act nasal spray  Self No No   Si spray into each nostril 2 (two) times a day   glycopyrrolate-formoterol (BEVESPI AEROSPHERE) 9-4.8 MCG/ACT inhaler  Self No No   Sig: Inhale 2 puffs 2 (two) times a day   insulin glulisine (Apidra SoloStar) 100 units/mL injection pen  Self No No   Si UNITS BREAKFAST 15 UNITS AT LUNCH AND DINNER   Patient taking differently: 30 UNITS BREAKFAST 15 UNITS AT LUNCH AND DINNER   levalbuterol (XOPENEX) 1.25 mg/0.5 mL nebulizer solution  Self No No   Sig: Take 0.5 mL (1.25 mg total) by nebulization 2 (two) times a day   loratadine (CLARITIN) 10 mg tablet  Self Yes No   Sig: Take by mouth   metFORMIN (GLUCOPHAGE-XR) 500 mg 24 hr tablet  Self No No   Sig: TAKE 2 TABLETS BY MOUTH TWICE A DAY WITH FOOD   metoprolol succinate (TOPROL-XL) 100 mg 24 hr tablet  Self No No   Sig: Take 1 tablet (100 mg total) by mouth daily   montelukast (SINGULAIR) 10 mg tablet  Self No No   Sig: TAKE 1 TABLET BY MOUTH DAILY AT BEDTIME   naloxone (NARCAN) 4 mg/0.1 mL nasal spray  Self No No Sig: Administer 1 spray into a nostril. If breathing does not return to normal or if breathing difficulty resumes after 2-3 minutes, give another dose in the other nostril using a new spray. omeprazole (PriLOSEC) 40 MG capsule  Self No No   Sig: TAKE 1 CAPSULE (40 MG TOTAL) BY MOUTH DAILY. potassium chloride (K-DUR,KLOR-CON) 20 mEq tablet  Self No No   Sig: Take 2 tablets (40 mEq total) by mouth daily Do not start before August 23, 2023. semaglutide, 0.25 or 0.5 mg/dose, (Ozempic, 0.25 or 0.5 MG/DOSE,) 2 mg/3 mL injection pen   No No   Sig: Inject 0.375 mL (0.25 mg total) under the skin every 7 days Increase to 0.5 mg after 4 weeks. traZODone (DESYREL) 150 mg tablet   No No   Sig: TAKE 1 TABLET BY MOUTH EVERYDAY AT BEDTIME      Facility-Administered Medications: None       Past Medical History:   Diagnosis Date    Acute blood loss anemia 6/1/2021    Acute diverticulitis 5/2/2021    Alcohol abuse 5/21/2020    Anemia     Atrial fibrillation (HCC)     Cervical radiculopathy     CHF (congestive heart failure) (HCC)     Chronic low back pain     Chronic obstructive asthma 2/20/2018    Cigarette nicotine dependence without complication 00/98/6140    Quit 12/10/2019.      Community acquired pneumonia 5/21/2020    Depression with anxiety 3/9/2014    Diabetes mellitus with hyperglycemia ([address])     Diverticulitis 6/6/2021    Elevated liver enzymes     GERD (gastroesophageal reflux disease)     Hypersomnolence 10/28/2020    Hypokalemia 12/4/2018    Lower gastrointestinal bleeding 6/1/2021    Paresthesia of upper extremity     Plantar fasciitis     Restless legs syndrome 4/8/2014    Sexual dysfunction     Sleep apnea, obstructive     Tenosynovitis of finger     Tinea corporis     Tobacco use 2/20/2018    Currently smoking 3-4 cigarettes daily    Trigger middle finger of left hand 12/14/2017    Trigger ring finger of left hand 12/14/2017    Weakness of upper extremity        Past Surgical History:   Procedure Laterality Date    ABDOMINAL SURGERY      CARPAL TUNNEL RELEASE Bilateral      SECTION      CHOLECYSTECTOMY      laparoscopic    ESOPHAGOGASTRODUODENOSCOPY N/A 12/3/2018    Procedure: ESOPHAGOGASTRODUODENOSCOPY (EGD) AND COLONOSCOPY;  Surgeon: Jose Alfred MD;  Location: [facility];  Service: Gastroenterology    GASTRIC BYPASS      for morbid obesity with gilda en y    [place] WRIST DORSAL/VOLAR PRIMARY Left 2017    Procedure: LONG FINGER GANGLION CYST EXCISION;  Surgeon: Yadira Monge MD;  Location: [facility];  Service: Orthopedics    [place] TENDON SHEATH INCISION Left 2017    Procedure: Espinoza Bennett, RING, TRIGGER FINGER RELEASE;  Surgeon: Yadira Monge MD;  Location: [facility];  Service: Orthopedics    SHOULDER SURGERY Right        Family History   Problem Relation Age of Onset    Alzheimer's disease Mother     Atrial fibrillation Mother     Dementia Mother     Heart disease Mother         heart problem    Seizures Mother     Parkinsonism Father     Arthritis Father     Atrial fibrillation Father     No Known Problems Daughter     Cri-du-chat syndrome Daughter     Colon cancer Maternal Grandmother 80    Diabetes type II Maternal Grandmother     No Known Problems Brother     No Known Problems Son     Substance Abuse Neg Hx         mother,father    Mental illness Neg Hx         mother,father    Colon polyps Neg Hx      I have reviewed and agree with the history as documented.     E-Cigarette/Vaping    E-Cigarette Use Never User      E-Cigarette/Vaping Substances    Nicotine No     THC No     CBD No     Flavoring No     Other No     Unknown No      Social History     Tobacco Use    Smoking status: Former     Packs/day: 0.25     Years: 29.00     Total pack years: 7.25     Types: Cigarettes     Start date: 200     Quit date: 12/10/2019     Years since quitting: 3.8    Smokeless tobacco: Never   Vaping Use    Vaping Use: Never used   Substance Use Topics Alcohol use: Not Currently     Alcohol/week: 20.0 standard drinks of alcohol     Types: 20 Cans of beer per week     Comment: about 6 coors light every night, stopped in 2019    Drug use: No       Review of Systems   Constitutional:  Negative for chills and fever. Respiratory:  Positive for chest tightness and shortness of breath. Negative for cough and wheezing. Cardiovascular:  Positive for palpitations. Negative for chest pain. Gastrointestinal:  Negative for abdominal pain, nausea and vomiting. Neurological:  Negative for dizziness and light-headedness. Physical Exam  Physical Exam  Vitals and nursing note reviewed. Constitutional:       General: She is not in acute distress. Appearance: Normal appearance. She is obese. She is not ill-appearing, toxic-appearing or diaphoretic. HENT:      Head: Normocephalic and atraumatic. Mouth/Throat:      Mouth: Mucous membranes are moist.   Eyes:      Conjunctiva/sclera: Conjunctivae normal.      Pupils: Pupils are equal, round, and reactive to light. Cardiovascular:      Rate and Rhythm: Normal rate and regular rhythm. Pulses: Normal pulses. Heart sounds: Normal heart sounds. No murmur heard. Pulmonary:      Effort: Pulmonary effort is normal. No respiratory distress. Breath sounds: No stridor. Wheezing present. No rhonchi or rales. Chest:      Chest wall: No tenderness. Abdominal:      General: Bowel sounds are normal. There is no distension. Palpations: Abdomen is soft. Tenderness: There is no abdominal tenderness. There is no guarding or rebound. Musculoskeletal:      Right lower leg: Edema present. Left lower leg: Edema present. Skin:     General: Skin is warm and dry. Neurological:      General: No focal deficit present. Mental Status: She is alert and oriented to person, place, and time. Mental status is at baseline.    Psychiatric:         Mood and Affect: Mood normal.         Behavior: Behavior normal.         Vital Signs  ED Triage Vitals [10/30/23 1445]   Temperature Pulse Respirations Blood Pressure SpO2   97.8 °F (36.6 °C) 85 20 163/93 94 %      Temp Source Heart Rate Source Patient Position - Orthostatic VS BP Location FiO2 (%)   Temporal Monitor Lying Left arm --      Pain Score       --           Vitals:    10/30/23 1445 10/30/23 1530 10/30/23 1600 10/30/23 1708   BP: 163/93 111/63 134/74 129/64   Pulse: 85 78 76 78   Patient Position - Orthostatic VS: Lying            Visual Acuity      ED Medications  Medications   ipratropium-albuterol (DUO-NEB) 0.5-2.5 mg/3 mL inhalation solution 3 mL (3 mL Nebulization Given 10/30/23 1514)   bumetanide (BUMEX) injection 1 mg (1 mg Intravenous Given 10/30/23 1622)   predniSONE tablet 40 mg (40 mg Oral Given 10/30/23 1810)       Diagnostic Studies  Results Reviewed       Procedure Component Value Units Date/Time    HS Troponin I 2hr [[id number]]  (Normal) Collected: 10/30/23 1716    Lab Status: Final result Specimen: Blood from Arm, Right Updated: 10/30/23 1745     hs TnI 2hr 4 ng/L      Delta 2hr hsTnI 0 ng/L     B-Type Natriuretic Peptide(BNP) [[id number]]  (Abnormal) Collected: 10/30/23 1510    Lab Status: Final result Specimen: Blood from Arm, Right Updated: 10/30/23 1550      pg/mL     HS Troponin 0hr (reflex protocol) [[id number]]  (Normal) Collected: 10/30/23 1510    Lab Status: Final result Specimen: Blood from Arm, Right Updated: 10/30/23 1539     hs TnI 0hr 4 ng/L     Comprehensive metabolic panel [[id number]]  (Abnormal) Collected: 10/30/23 1510    Lab Status: Final result Specimen: Blood from Arm, Right Updated: 10/30/23 1535     Sodium 135 mmol/L      Potassium 4.3 mmol/L      Chloride 93 mmol/L      CO2 34 mmol/L      ANION GAP 8 mmol/L      BUN 10 mg/dL      Creatinine 0.62 mg/dL      Glucose 250 mg/dL      Calcium 9.0 mg/dL      AST 39 U/L      ALT 20 U/L      Alkaline Phosphatase 123 U/L      Total Protein 7.0 g/dL      Albumin 4.2 g/dL      Total Bilirubin 0.45 mg/dL      eGFR 95 ml/min/1.73sq m     Narrative:      National Kidney Disease Foundation guidelines for Chronic Kidney Disease (CKD):     Stage 1 with normal or high GFR (GFR > 90 mL/min/1.73 square meters)    Stage 2 Mild CKD (GFR = 60-89 mL/min/1.73 square meters)    Stage 3A Moderate CKD (GFR = 45-59 mL/min/1.73 square meters)    Stage 3B Moderate CKD (GFR = 30-44 mL/min/1.73 square meters)    Stage 4 Severe CKD (GFR = 15-29 mL/min/1.73 square meters)    Stage 5 End Stage CKD (GFR <15 mL/min/1.73 square meters)  Note: GFR calculation is accurate only with a steady state creatinine    CBC and differential [[id number]]  (Abnormal) Collected: 10/30/23 1510    Lab Status: Final result Specimen: Blood from Arm, Right Updated: 10/30/23 1517     WBC 12.83 Thousand/uL      RBC 4.90 Million/uL      Hemoglobin 10.6 g/dL      Hematocrit 36.7 %      MCV 75 fL      MCH 21.6 pg      MCHC 28.9 g/dL      RDW 21.0 %      Platelets 351 Thousands/uL      nRBC 0 /100 WBCs      Neutrophils Relative 82 %      Immat GRANS % 0 %      Lymphocytes Relative 10 %      Monocytes Relative 5 %      Eosinophils Relative 2 %      Basophils Relative 1 %      Neutrophils Absolute 10.53 Thousands/µL      Immature Grans Absolute 0.05 Thousand/uL      Lymphocytes Absolute 1.34 Thousands/µL      Monocytes Absolute 0.63 Thousand/µL      Eosinophils Absolute 0.21 Thousand/µL      Basophils Absolute 0.07 Thousands/µL                    XR chest 1 view portable   ED Interpretation by Lawyer Sony DO (10/30 4410)   No acute abnormalities as interpreted by me independently       Final Result by Cass Cranker, MD (10/31 8870)      Cardiomegaly. Mild vascular congestion.                Workstation performed: [id number]                    Procedures  ECG 12 Lead Documentation Only    Date/Time: 10/30/2023 3:19 PM    Performed by: Lawyer Sony DO  Authorized by: Lawyer Sony DO    Indications / Diagnosis: Palpitations  ECG reviewed by me, the ED Provider: yes    Patient location:  ED  Previous ECG:     Previous ECG:  Compared to current    Comparison ECG info:  9/13/2023    Similarity:  No change  Interpretation:     Interpretation: non-specific    Rate:     ECG rate:  84    ECG rate assessment: normal    Rhythm:     Rhythm: sinus rhythm    Ectopy:     Ectopy: none    QRS:     QRS axis:  Normal    QRS intervals:  Normal  Conduction:     Conduction: normal    ST segments:     ST segments:  Normal  T waves:     T waves: normal    Comments:      Qtc 460             ED Course  ED Course as of 11/01/23 0652   Mon Oct 30, 2023   1550 Hemoglobin(!): 10.6  Within baseline range     1550 Patient care signed out to Dr. Darci Barakat. Patient presented with palpitations and some shortness of breath/ chest tightness over the last few days. EKG unchanged, troponin 4- pending delta. BNP also pending, but CXR appears unchanged from prior. SBIRT 20yo+      Flowsheet Row Most Recent Value   Initial Alcohol Screen: US AUDIT-C     1. How often do you have a drink containing alcohol? 0 Filed at: 10/30/2023 1452   2. How many drinks containing alcohol do you have on a typical day you are drinking? 0 Filed at: 10/30/2023 1452   3a. Male UNDER 65: How often do you have five or more drinks on one occasion? 0 Filed at: 10/30/2023 1452   3b. FEMALE Any Age, or MALE 65+: How often do you have 4 or more drinks on one occassion? 0 Filed at: 10/30/2023 1452   Audit-C Score 0 Filed at: 10/30/2023 1452   EJIMY: How many times in the past year have you. .. Used an illegal drug or used a prescription medication for non-medical reasons? Never Filed at: 10/30/2023 1452                      Medical Decision Making  Assessment and Plan:   Differential includes COPD with exacerbation v. CHF with exacerbation v. Less likely ACS or PNA.  Will check labs to assess for leukocytosis, anemia, electrolyte abnormalities, kidney and liver function; CXR to assess for PNA and pulm vasc congestion; EKG/ trop to assess for arrhythmia/ ischemia. Amount and/or Complexity of Data Reviewed  Labs: ordered. Decision-making details documented in ED Course. Radiology: ordered and independent interpretation performed. Risk  Prescription drug management. Disposition  Final diagnoses:   COPD exacerbation ([address])   CHF exacerbation ([address])     Time reflects when diagnosis was documented in both MDM as applicable and the Disposition within this note       Time User Action Codes Description Comment    10/30/2023  6:01 PM Jody Roosevelt Add [J44.1] COPD exacerbation ([address])     10/30/2023  6:02 PM Tierra Garvin Add [I50.9] CHF exacerbation [facility])           ED Disposition       ED Disposition   Discharge    Condition   Stable    Date/Time   Mon Oct 30, 2023 1802    [facility] discharge to home/self care.                    Follow-up Information       Follow up With Specialties Details Why Contact Info Additional Information    Ventura Nelson MD Family Medicine Schedule an appointment as soon as possible for a visit in 3 days for re-evaluation [address] [phone]        [address] [facility] Emergency Medicine Go to  If symptoms worsen [address] [facility] [id number]  [address] [facility], [address] [facility] [place], [address] [facility]            Discharge Medication List as of 10/30/2023  6:03 PM        START taking these medications    Details   predniSONE 20 mg tablet Take 2 tablets (40 mg total) by mouth daily for 4 days Do not start before October 31, 2023., Starting Tue 10/31/2023, Until Sat 11/4/2023, Normal      Blood Glucose Monitoring Suppl (Contour Next One) AILYN USE AS DIRECTED 3 TIMES A DAY, Normal           CONTINUE these medications which have NOT CHANGED Details   albuterol (2.5 mg/3 mL) 0.083 % nebulizer solution Take 3 mL (2.5 mg total) by nebulization every 6 (six) hours as needed for wheezing or shortness of breath, Starting Thu 6/8/2023, Normal      albuterol (PROVENTIL HFA,VENTOLIN HFA) 90 mcg/act inhaler Inhale 2 puffs every 4 (four) hours as needed for wheezing, Starting Thu 6/8/2023, Normal      apixaban (Eliquis) 5 mg Take 1 tablet (5 mg total) by mouth 2 (two) times a day, Starting Mon 8/7/2023, Normal      atorvastatin (LIPITOR) 40 mg tablet Take 1 tablet (40 mg total) by mouth daily, Starting Wed 3/29/2023, Normal      BD Pen Needle Love 2nd Gen 32G X 4 MM MISC USE DAILY AT BEDTIME USE 4 NEW NEEDLES DAILY . , Starting Wed 9/13/2023, Normal      Benralizumab 30 MG/ML SOAJ Inject 1 mL under the skin every 28 days, Starting Thu 6/8/2023, Normal      budesonide (PULMICORT) 0.5 mg/2 mL nebulizer solution TAKE 2 ML (0.5 MG TOTAL) BY NEBULIZATION TWICE A DAY RINSE MOUTH AFTER USE, Normal      bumetanide (BUMEX) 2 mg tablet Take 2 tablets (4 mg total) by mouth 2 (two) times a day, Starting Wed 10/11/2023, Until Fri 11/10/2023, Normal      Contour Next Test test strip USE TO TEST BLOOD SUGAR 3 TIMES A DAY, Normal      [employer] Lancets Thin 26G MISC USE 3 (THREE) TIMES A DAY, Normal      EPINEPHrine (EPIPEN) 0.3 mg/0.3 mL SOAJ Inject 0.3 mL (0.3 mg total) into a muscle once for 1 dose, Starting Thu 10/19/2023, Normal      escitalopram (LEXAPRO) 20 mg tablet TAKE 1 TABLET BY MOUTH EVERY DAY, Normal      ferrous sulfate 220 (44 Fe) mg/5 mL solution TAKE 5 ML (220 MG TOTAL) BY MOUTH 2 (TWO) TIMES A DAY BEFORE MEALS, Starting Tue 2/1/2022, Until Wed 9/20/2023, Normal      fluticasone (FLONASE) 50 mcg/act nasal spray 1 spray into each nostril 2 (two) times a day, Starting Wed 9/23/2020, Normal      glycopyrrolate-formoterol (BEVESPI AEROSPHERE) 9-4.8 MCG/ACT inhaler Inhale 2 puffs 2 (two) times a day, Starting Thu 6/8/2023, Normal      insulin glulisine (Apidra SoloStar) 100 units/mL injection pen 24 UNITS BREAKFAST 15 UNITS AT LUNCH AND DINNER, No Print      Lantus SoloStar 100 units/mL SOPN INJECT 0.3 ML (30 UNITS TOTAL) UNDER THE SKIN DAILY AT BEDTIME, Starting Mon 8/14/2023, Normal      levalbuterol (XOPENEX) 1.25 mg/0.5 mL nebulizer solution Take 0.5 mL (1.25 mg total) by nebulization 2 (two) times a day, Starting Mon 11/16/2020, Normal      loratadine (CLARITIN) 10 mg tablet Take by mouth, Historical Med      LORazepam (ATIVAN) 0.5 mg tablet TAKE 2 TABLETS BY MOUTH AT BEDTIME AND 1 EVERY 6 HOURS AS NEEDED FOR ANXIETY, Normal      metFORMIN (GLUCOPHAGE-XR) 500 mg 24 hr tablet TAKE 2 TABLETS BY MOUTH TWICE A DAY WITH FOOD, Normal      metoprolol succinate (TOPROL-XL) 100 mg 24 hr tablet Take 1 tablet (100 mg total) by mouth daily, Starting Wed 3/29/2023, Normal      montelukast (SINGULAIR) 10 mg tablet TAKE 1 TABLET BY MOUTH DAILY AT BEDTIME, Normal      naloxone (NARCAN) 4 mg/0.1 mL nasal spray Administer 1 spray into a nostril. If breathing does not return to normal or if breathing difficulty resumes after 2-3 minutes, give another dose in the other nostril using a new spray., Normal      omeprazole (PriLOSEC) 40 MG capsule TAKE 1 CAPSULE (40 MG TOTAL) BY MOUTH DAILY. , Starting Sun 6/25/2023, Normal      potassium chloride (K-DUR,KLOR-CON) 20 mEq tablet Take 2 tablets (40 mEq total) by mouth daily Do not start before August 23, 2023., Starting Wed 8/23/2023, Normal      semaglutide, 0.25 or 0.5 mg/dose, (Ozempic, 0.25 or 0.5 MG/DOSE,) 2 mg/3 mL injection pen Inject 0.375 mL (0.25 mg total) under the skin every 7 days Increase to 0.5 mg after 4 weeks. , Starting Wed 9/20/2023, Normal      traZODone (DESYREL) 150 mg tablet TAKE 1 TABLET BY MOUTH EVERYDAY AT BEDTIME, Starting Tue 10/24/2023, Normal             No discharge procedures on file.     PDMP Review         Value Time User    PDMP Reviewed  Yes 8/23/2023  2:59 Ova MD Cici            ED Provider  Electronically Signed by             Jose Glass DO  11/01/23 0761

## 2023-10-31 ENCOUNTER — PATIENT OUTREACH (OUTPATIENT)
Dept: FAMILY MEDICINE CLINIC | Facility: HOSPITAL | Age: 63
End: 2023-10-31

## 2023-10-31 ENCOUNTER — HOSPITAL ENCOUNTER (OUTPATIENT)
Dept: INFUSION CENTER | Facility: HOSPITAL | Age: 63
Discharge: HOME | End: 2023-10-31
Attending: INTERNAL MEDICINE

## 2023-10-31 DIAGNOSIS — J82.83 EOSINOPHILIC ASTHMA: Primary | ICD-10-CM

## 2023-10-31 RX ORDER — EPINEPHRINE 1 MG/ML
0.3 INJECTION, SOLUTION, CONCENTRATE INTRAVENOUS
OUTPATIENT
Start: 2023-11-16

## 2023-10-31 NOTE — PROGRESS NOTES
Outpatient Care Management Note:    ADT alert received. Patient was in ER on 10/30 with COPD exacerbation. She was given a duoneb treatment, IV bumex,and discharged on prednisone burst. (Prednisone 40 mg daily X 4 days). POLO called Ivy. She states that her breathing is better today, although she has not checked her pulse ox since coming home. POLO requested she check it now, but she stated she was in the bathroom. I instructed  her to check it when able. We reviewed that the goal would be to keep it above 90%. Ivy states that she started the 4 day increase of prednisone and is using he oxygen at 4 L. Ivy states that she has all medications and is taking everything. We again discussed checking daily weights. Ivy admits that she has not been doing it. Polo re explained why we do it and how it can help to prevent re hospitalization. Ivy states that she has a scale and feels safe to get on the scale. Ivy did note that her blood sugars are improved on the ozempic. She states they are averaging in the 100s during the day and 200-300 in the afternoon. Ivy has not sent her sugars to endocrine as instructed. She has a dexcom. I strongly encouraged her to send them, so they can determine if any medication changes are needed. POLO had a heart to heart discussion with Ivy about actively managing her medical needs. She can help prevent some of her hospitalizations by checking weights, sending blood sugars to endocrine, attending and scheduling follow up appts. (POLO reminded Ivy that she needs to schedule with  cardiology, pulmonary and podiatry). POLO reminded Ivy that I can not do this part for her! Ivy will consider our discussion. Ivy has my contact information and will call with any questions.

## 2023-11-08 DIAGNOSIS — N18.2 TYPE 2 DIABETES MELLITUS WITH STAGE 2 CHRONIC KIDNEY DISEASE, WITH LONG-TERM CURRENT USE OF INSULIN (HCC): ICD-10-CM

## 2023-11-08 DIAGNOSIS — E11.22 TYPE 2 DIABETES MELLITUS WITH STAGE 2 CHRONIC KIDNEY DISEASE, WITH LONG-TERM CURRENT USE OF INSULIN (HCC): ICD-10-CM

## 2023-11-08 DIAGNOSIS — Z79.4 TYPE 2 DIABETES MELLITUS WITH STAGE 2 CHRONIC KIDNEY DISEASE, WITH LONG-TERM CURRENT USE OF INSULIN (HCC): ICD-10-CM

## 2023-11-08 DIAGNOSIS — Z79.899 ENCOUNTER FOR LONG-TERM (CURRENT) DRUG USE: ICD-10-CM

## 2023-11-08 RX ORDER — INSULIN GLULISINE 100 [IU]/ML
INJECTION, SOLUTION SUBCUTANEOUS
Qty: 15 ML | Refills: 5 | Status: SHIPPED | OUTPATIENT
Start: 2023-11-08

## 2023-11-09 ENCOUNTER — OFFICE VISIT (OUTPATIENT)
Dept: FAMILY MEDICINE CLINIC | Facility: HOSPITAL | Age: 63
End: 2023-11-09
Payer: COMMERCIAL

## 2023-11-09 VITALS
DIASTOLIC BLOOD PRESSURE: 74 MMHG | TEMPERATURE: 97.8 F | WEIGHT: 284.8 LBS | SYSTOLIC BLOOD PRESSURE: 134 MMHG | BODY MASS INDEX: 50.46 KG/M2 | HEIGHT: 63 IN | HEART RATE: 80 BPM

## 2023-11-09 DIAGNOSIS — J44.9 COPD, SEVERE (HCC): ICD-10-CM

## 2023-11-09 DIAGNOSIS — E11.65 TYPE 2 DIABETES MELLITUS WITH HYPERGLYCEMIA, WITH LONG-TERM CURRENT USE OF INSULIN (HCC): Primary | ICD-10-CM

## 2023-11-09 DIAGNOSIS — I48.0 PAROXYSMAL ATRIAL FIBRILLATION (HCC): ICD-10-CM

## 2023-11-09 DIAGNOSIS — Z23 ENCOUNTER FOR IMMUNIZATION: ICD-10-CM

## 2023-11-09 DIAGNOSIS — I50.9 ACUTE EXACERBATION OF CHF (CONGESTIVE HEART FAILURE) (HCC): ICD-10-CM

## 2023-11-09 DIAGNOSIS — Z79.4 TYPE 2 DIABETES MELLITUS WITH HYPERGLYCEMIA, WITH LONG-TERM CURRENT USE OF INSULIN (HCC): Primary | ICD-10-CM

## 2023-11-09 DIAGNOSIS — J45.50 SEVERE PERSISTENT ASTHMA, UNSPECIFIED WHETHER COMPLICATED: ICD-10-CM

## 2023-11-09 PROBLEM — J44.1 ASTHMA WITH COPD WITH EXACERBATION: Status: RESOLVED | Noted: 2020-11-12 | Resolved: 2023-11-09

## 2023-11-09 PROBLEM — I50.33 ACUTE ON CHRONIC DIASTOLIC CONGESTIVE HEART FAILURE (HCC): Status: RESOLVED | Noted: 2020-05-21 | Resolved: 2023-11-09

## 2023-11-09 PROBLEM — J96.21 ACUTE ON CHRONIC RESPIRATORY FAILURE WITH HYPOXIA (HCC): Status: RESOLVED | Noted: 2018-11-30 | Resolved: 2023-11-09

## 2023-11-09 PROBLEM — J44.1 COPD EXACERBATION (HCC): Status: RESOLVED | Noted: 2020-09-16 | Resolved: 2023-11-09

## 2023-11-09 PROBLEM — J45.51 SEVERE PERSISTENT ASTHMA WITH EXACERBATION: Status: RESOLVED | Noted: 2018-02-20 | Resolved: 2023-11-09

## 2023-11-09 PROBLEM — J45.901 ASTHMA WITH COPD WITH EXACERBATION: Status: RESOLVED | Noted: 2020-11-12 | Resolved: 2023-11-09

## 2023-11-09 PROCEDURE — 99214 OFFICE O/P EST MOD 30 MIN: CPT | Performed by: NURSE PRACTITIONER

## 2023-11-09 PROCEDURE — 90471 IMMUNIZATION ADMIN: CPT

## 2023-11-09 PROCEDURE — 90677 PCV20 VACCINE IM: CPT

## 2023-11-09 RX ORDER — BUMETANIDE 2 MG/1
4 TABLET ORAL 2 TIMES DAILY
Qty: 120 TABLET | Refills: 0 | Status: SHIPPED | OUTPATIENT
Start: 2023-11-09

## 2023-11-09 RX ORDER — LORAZEPAM 0.5 MG/1
TABLET ORAL
Qty: 90 TABLET | Refills: 0 | Status: SHIPPED | OUTPATIENT
Start: 2023-11-09

## 2023-11-09 NOTE — ASSESSMENT & PLAN NOTE
Continue inhalers and nebulizers as rx'd by pulmonary  Schedule f/u she is due for  Continue O2 via nasal cannula

## 2023-11-09 NOTE — ASSESSMENT & PLAN NOTE
Continue inhalers/nebulizer as rx'd by pulmonary  Maintain continuous O2   She is due to schedule f/u with pulmonary

## 2023-11-09 NOTE — ASSESSMENT & PLAN NOTE
Lab Results   Component Value Date    HGBA1C 10.7 (A) 09/20/2023     Poorly controlled as managed by endocrine  Update labs and maintain follow up as scheduled in January

## 2023-11-09 NOTE — ASSESSMENT & PLAN NOTE
Clinically stable on meds as rx'd by cardiology  She is due to schedule follow up - advise she do so ASAP

## 2023-11-09 NOTE — PROGRESS NOTES
Name: Pili Bush      : 1960      MRN: [id number]  Encounter Provider: VICKY Bello  Encounter Date: 2023   Encounter department: [address]     1. Type 2 diabetes mellitus with hyperglycemia, with long-term current use of insulin [facility])  Assessment & Plan:    Lab Results   Component Value Date    HGBA1C 10.7 (A) 2023     Poorly controlled as managed by endocrine  Update labs and maintain follow up as scheduled in January    Orders:  -     Ambulatory Referral to Palliative Care; Future    2. Severe persistent asthma, unspecified whether complicated  Assessment & Plan:  Continue inhalers/nebulizer as rx'd by pulmonary  Maintain continuous O2   She is due to schedule f/u with pulmonary    Orders:  -     Ambulatory Referral to Palliative Care; Future    3. COPD, severe ([address])  Assessment & Plan:  Continue inhalers and nebulizers as rx'd by pulmonary  Schedule f/u she is due for  Continue O2 via nasal cannula    Orders:  -     Ambulatory Referral to Palliative Care; Future    4. Paroxysmal atrial fibrillation (HCC)  Assessment & Plan:  Clinically stable on meds as rx'd by cardiology  She is due to schedule follow up - advise she do so ASAP    Orders:  -     Ambulatory Referral to Palliative Care; Future    5. Encounter for immunization  -     Pneumococcal Conjugate Vaccine 20-valent (Pcv20)      Palliative care consult entered as requested - would benefit w/assistance in managing multiple chronic co-morbidities  PCV updated. Advise she obtain RSV and covid at pharmacy (wait 2 weeks)  Return to update gyn exam/pap smear in January  Update mammo as previously ordered       Subjective        Here with girlfriend. States she is here for a check up. She follows w/endocrine for diabetes management and her brother thinks she should consider mounjaro instead of ozempic. Due for labs and appt in January.    Friend believes palliative care would be helpful. She lives alone and has home care aide come once/week to help with cleaning and hygiene needs. Pt requesting pap smear. Review of Systems   Constitutional:  Positive for fatigue. Negative for appetite change. Respiratory:  Positive for shortness of breath. Negative for cough. Cardiovascular: Negative. Current Outpatient Medications on File Prior to Visit   Medication Sig    albuterol (2.5 mg/3 mL) 0.083 % nebulizer solution Take 3 mL (2.5 mg total) by nebulization every 6 (six) hours as needed for wheezing or shortness of breath    albuterol (PROVENTIL HFA,VENTOLIN HFA) 90 mcg/act inhaler Inhale 2 puffs every 4 (four) hours as needed for wheezing    apixaban (Eliquis) 5 mg Take 1 tablet (5 mg total) by mouth 2 (two) times a day    atorvastatin (LIPITOR) 40 mg tablet Take 1 tablet (40 mg total) by mouth daily    BD Pen Needle Love 2nd Gen 32G X 4 MM MISC USE DAILY AT BEDTIME USE 4 NEW NEEDLES DAILY .     Benralizumab 30 MG/ML SOAJ Inject 1 mL under the skin every 28 days    Blood Glucose Monitoring Suppl (Contour Next One) AILYN USE AS DIRECTED 3 TIMES A DAY    budesonide (PULMICORT) 0.5 mg/2 mL nebulizer solution TAKE 2 ML (0.5 MG TOTAL) BY NEBULIZATION TWICE A DAY RINSE MOUTH AFTER USE    Contour Next Test test strip USE TO TEST BLOOD SUGAR 3 TIMES A DAY    [employer] Lancets Thin 26G MISC USE 3 (THREE) TIMES A DAY    EPINEPHrine (EPIPEN) 0.3 mg/0.3 mL SOAJ Inject 0.3 mL (0.3 mg total) into a muscle once for 1 dose    escitalopram (LEXAPRO) 20 mg tablet TAKE 1 TABLET BY MOUTH EVERY DAY    ferrous sulfate 220 (44 Fe) mg/5 mL solution TAKE 5 ML (220 MG TOTAL) BY MOUTH 2 (TWO) TIMES A DAY BEFORE MEALS    fluticasone (FLONASE) 50 mcg/act nasal spray 1 spray into each nostril 2 (two) times a day    glycopyrrolate-formoterol (BEVESPI AEROSPHERE) 9-4.8 MCG/ACT inhaler Inhale 2 puffs 2 (two) times a day    insulin glulisine (Apidra SoloStar) 100 units/mL injection pen 30 UNITS BREAKFAST 15 UNITS AT LUNCH AND DINNER    Lantus SoloStar 100 units/mL SOPN INJECT 0.3 ML (30 UNITS TOTAL) UNDER THE SKIN DAILY AT BEDTIME    levalbuterol (XOPENEX) 1.25 mg/0.5 mL nebulizer solution Take 0.5 mL (1.25 mg total) by nebulization 2 (two) times a day    loratadine (CLARITIN) 10 mg tablet Take by mouth    metFORMIN (GLUCOPHAGE-XR) 500 mg 24 hr tablet TAKE 2 TABLETS BY MOUTH TWICE A DAY WITH FOOD    metoprolol succinate (TOPROL-XL) 100 mg 24 hr tablet Take 1 tablet (100 mg total) by mouth daily    montelukast (SINGULAIR) 10 mg tablet TAKE 1 TABLET BY MOUTH DAILY AT BEDTIME    naloxone (NARCAN) 4 mg/0.1 mL nasal spray Administer 1 spray into a nostril. If breathing does not return to normal or if breathing difficulty resumes after 2-3 minutes, give another dose in the other nostril using a new spray. omeprazole (PriLOSEC) 40 MG capsule TAKE 1 CAPSULE (40 MG TOTAL) BY MOUTH DAILY. potassium chloride (K-DUR,KLOR-CON) 20 mEq tablet Take 2 tablets (40 mEq total) by mouth daily Do not start before August 23, 2023. semaglutide, 0.25 or 0.5 mg/dose, (Ozempic, 0.25 or 0.5 MG/DOSE,) 2 mg/3 mL injection pen Inject 0.375 mL (0.25 mg total) under the skin every 7 days Increase to 0.5 mg after 4 weeks. traZODone (DESYREL) 150 mg tablet TAKE 1 TABLET BY MOUTH EVERYDAY AT BEDTIME    LORazepam (ATIVAN) 0.5 mg tablet TAKE 2 TABLETS BY MOUTH AT BEDTIME AND 1 EVERY 6 HOURS AS NEEDED FOR ANXIETY    [DISCONTINUED] bumetanide (BUMEX) 2 mg tablet Take 2 tablets (4 mg total) by mouth 2 (two) times a day       Objective     /74   Pulse 80   Temp 97.8 °F (36.6 °C)   Ht 5' 3" (1.6 m)   Wt 129 kg (284 lb 12.8 oz)   BMI 50.45 kg/m²       Physical Exam  Vitals reviewed. Constitutional:       Appearance: She is obese. She is ill-appearing. HENT:      Head: Normocephalic. Eyes:      General: No scleral icterus. Cardiovascular:      Rate and Rhythm: Normal rate and regular rhythm. Heart sounds: No murmur heard.   Pulmonary: Effort: Respiratory distress (mild at rest) present. Breath sounds: Decreased breath sounds present. Comments: Continuous O2 via nasal cannula  Musculoskeletal:      Right lower leg: No edema. Left lower leg: No edema. Skin:     General: Skin is warm and dry. Neurological:      General: No focal deficit present. Mental Status: She is alert and oriented to person, place, and time. Gait: Gait abnormal (NWB in wheelchair).    Psychiatric:         Mood and Affect: Mood normal.         Behavior: Behavior normal.         VICKY Mann

## 2023-11-11 DIAGNOSIS — J45.51 SEVERE PERSISTENT ASTHMA WITH ACUTE EXACERBATION: ICD-10-CM

## 2023-11-11 RX ORDER — MONTELUKAST SODIUM 10 MG/1
10 TABLET ORAL
Qty: 90 TABLET | Refills: 3 | Status: SHIPPED | OUTPATIENT
Start: 2023-11-11

## 2023-11-14 ENCOUNTER — PATIENT OUTREACH (OUTPATIENT)
Dept: FAMILY MEDICINE CLINIC | Facility: HOSPITAL | Age: 63
End: 2023-11-14

## 2023-11-14 NOTE — PROGRESS NOTES
Outpatient Care Management Note:    Care manager called Ivy. She states that she is doing ok. She feels her breathing is stable. She has not been checking her pulse ox. She states that she does have a pulse ox. I encouraged her to monitor and check this at least once a day. She is on oxygen at 4 L. Ivy is not checking weights despite CM reviewing how and why we check weights. We again discussed being proactive in her care. Ivy has a dexcom. She states that her sugars are still averaging around 100s in the day and increase in the afternoon. Right now it was 282. Ivy has not sent her sugars to endocrine to review. CM strongly encouraged her to do this regularly. She has an infusion on 11/16 and will consider doing it that day. She has a friend taking her for the infusion. We again reveiwed that Ivy needs to schedule follow up appts with cardiology and pulmonary. She denied any reason for why she has not scheduled. She did see her podiatris in November. Ivy has my contact information and will call with any questions.

## 2023-11-16 ENCOUNTER — HOSPITAL ENCOUNTER (OUTPATIENT)
Dept: INFUSION CENTER | Facility: HOSPITAL | Age: 63
Discharge: HOME | End: 2023-11-16
Attending: INTERNAL MEDICINE

## 2023-11-16 ENCOUNTER — TELEPHONE (OUTPATIENT)
Dept: INFUSION CENTER | Facility: HOSPITAL | Age: 63
End: 2023-11-16

## 2023-11-16 NOTE — TELEPHONE ENCOUNTER
We canceled her appt for today due to her Keturaha Otter being delivered to her house. Can you please contact pt with what is happening with the delivery of her meds and when can we reschedule her. Thank you.

## 2023-11-16 NOTE — TELEPHONE ENCOUNTER
Called patient I advised her that the medication should of went to the infusion center. She stated that she will inject her self this month but the next one she will call to have it delivered to the infusion center.

## 2023-11-21 DIAGNOSIS — I48.91 ATRIAL FIBRILLATION, UNSPECIFIED TYPE (HCC): ICD-10-CM

## 2023-11-21 RX ORDER — APIXABAN 5 MG/1
5 TABLET, FILM COATED ORAL 2 TIMES DAILY
Qty: 60 TABLET | Refills: 2 | Status: SHIPPED | OUTPATIENT
Start: 2023-11-21

## 2023-12-04 DIAGNOSIS — F32.1 CURRENT MODERATE EPISODE OF MAJOR DEPRESSIVE DISORDER WITHOUT PRIOR EPISODE (HCC): ICD-10-CM

## 2023-12-04 RX ORDER — ESCITALOPRAM OXALATE 20 MG/1
TABLET ORAL
Qty: 90 TABLET | Refills: 0 | Status: SHIPPED | OUTPATIENT
Start: 2023-12-04

## 2023-12-06 ENCOUNTER — PATIENT OUTREACH (OUTPATIENT)
Dept: FAMILY MEDICINE CLINIC | Facility: HOSPITAL | Age: 63
End: 2023-12-06

## 2023-12-06 DIAGNOSIS — N18.2 TYPE 2 DIABETES MELLITUS WITH STAGE 2 CHRONIC KIDNEY DISEASE, WITH LONG-TERM CURRENT USE OF INSULIN (HCC): ICD-10-CM

## 2023-12-06 DIAGNOSIS — E66.9 DIABETES MELLITUS TYPE 2 IN OBESE: ICD-10-CM

## 2023-12-06 DIAGNOSIS — E11.69 DIABETES MELLITUS TYPE 2 IN OBESE: ICD-10-CM

## 2023-12-06 DIAGNOSIS — B35.4 TINEA CORPORIS: ICD-10-CM

## 2023-12-06 DIAGNOSIS — J96.21 ACUTE ON CHRONIC RESPIRATORY FAILURE WITH HYPOXIA (HCC): ICD-10-CM

## 2023-12-06 DIAGNOSIS — E11.22 TYPE 2 DIABETES MELLITUS WITH STAGE 2 CHRONIC KIDNEY DISEASE, WITH LONG-TERM CURRENT USE OF INSULIN (HCC): ICD-10-CM

## 2023-12-06 DIAGNOSIS — E11.22 TYPE 2 DIABETES MELLITUS WITH STAGE 2 CHRONIC KIDNEY DISEASE, WITHOUT LONG-TERM CURRENT USE OF INSULIN: ICD-10-CM

## 2023-12-06 DIAGNOSIS — N18.2 TYPE 2 DIABETES MELLITUS WITH STAGE 2 CHRONIC KIDNEY DISEASE, WITHOUT LONG-TERM CURRENT USE OF INSULIN: ICD-10-CM

## 2023-12-06 DIAGNOSIS — I48.91 ATRIAL FIBRILLATION, UNSPECIFIED TYPE (HCC): ICD-10-CM

## 2023-12-06 DIAGNOSIS — Z79.4 TYPE 2 DIABETES MELLITUS WITH STAGE 2 CHRONIC KIDNEY DISEASE, WITH LONG-TERM CURRENT USE OF INSULIN (HCC): ICD-10-CM

## 2023-12-06 DIAGNOSIS — J44.9 CHRONIC OBSTRUCTIVE PULMONARY DISEASE, UNSPECIFIED COPD TYPE (HCC): ICD-10-CM

## 2023-12-06 RX ORDER — INSULIN GLULISINE 100 [IU]/ML
INJECTION, SOLUTION SUBCUTANEOUS
Qty: 15 ML | Refills: 5 | Status: SHIPPED | OUTPATIENT
Start: 2023-12-06

## 2023-12-06 RX ORDER — POTASSIUM CHLORIDE 20 MEQ/1
40 TABLET, EXTENDED RELEASE ORAL DAILY
Qty: 60 TABLET | Refills: 0 | Status: SHIPPED | OUTPATIENT
Start: 2023-12-06

## 2023-12-06 RX ORDER — PEN NEEDLE, DIABETIC 32GX 5/32"
NEEDLE, DISPOSABLE MISCELLANEOUS
Qty: 400 EACH | Refills: 3 | Status: SHIPPED | OUTPATIENT
Start: 2023-12-06

## 2023-12-06 RX ORDER — ATORVASTATIN CALCIUM 40 MG/1
40 TABLET, FILM COATED ORAL DAILY
Qty: 90 TABLET | Refills: 3 | Status: SHIPPED | OUTPATIENT
Start: 2023-12-06

## 2023-12-06 RX ORDER — INSULIN GLARGINE 100 [IU]/ML
0.3 INJECTION, SOLUTION SUBCUTANEOUS
Qty: 15 ML | Refills: 3 | Status: SHIPPED | OUTPATIENT
Start: 2023-12-06

## 2023-12-06 NOTE — PROGRESS NOTES
Outpatient Care Management Note:    Voice mail message left for Ivy, with my contact information, requesting a call back.

## 2023-12-08 NOTE — ASSESSMENT & PLAN NOTE
• Encourage weight loss and lifestyle changes [NS_DeliveryAttending1_OBGYN_ALL_OB_FT:[id number]==],[NS_DeliveryAssist1_OBGYN_ALL_OB_FT:[id number]=] [NS_DeliveryAttending1_OBGYN_ALL_OB_FT:[id number]==],[NS_DeliveryAssist1_OBGYN_ALL_OB_FT:[id number]=]

## 2023-12-11 ENCOUNTER — DOCUMENTATION (OUTPATIENT)
Dept: ENDOCRINOLOGY | Facility: HOSPITAL | Age: 63
End: 2023-12-11

## 2023-12-12 DIAGNOSIS — J82.83 EOSINOPHILIC ASTHMA: Primary | ICD-10-CM

## 2023-12-12 RX ORDER — EPINEPHRINE 1 MG/ML
0.3 INJECTION, SOLUTION, CONCENTRATE INTRAVENOUS
Status: CANCELLED | OUTPATIENT
Start: 2023-12-14

## 2023-12-14 ENCOUNTER — HOSPITAL ENCOUNTER (OUTPATIENT)
Dept: INFUSION CENTER | Facility: HOSPITAL | Age: 63
Discharge: HOME | End: 2023-12-14
Attending: INTERNAL MEDICINE
Payer: MEDICARE

## 2023-12-14 VITALS
OXYGEN SATURATION: 96 % | DIASTOLIC BLOOD PRESSURE: 78 MMHG | TEMPERATURE: 98.6 F | SYSTOLIC BLOOD PRESSURE: 138 MMHG | HEART RATE: 107 BPM

## 2023-12-14 DIAGNOSIS — J82.83 EOSINOPHILIC ASTHMA: Primary | ICD-10-CM

## 2023-12-14 PROCEDURE — 96372 THER/PROPH/DIAG INJ SC/IM: CPT

## 2023-12-14 RX ORDER — EPINEPHRINE 1 MG/ML
0.3 INJECTION, SOLUTION, CONCENTRATE INTRAVENOUS
OUTPATIENT
Start: 2024-01-11

## 2023-12-14 RX ORDER — EPINEPHRINE 1 MG/ML
0.3 INJECTION, SOLUTION, CONCENTRATE INTRAVENOUS
Status: DISCONTINUED | OUTPATIENT
Start: 2023-12-14 | End: 2023-12-17 | Stop reason: HOSPADM

## 2023-12-14 RX ADMIN — BENRALIZUMAB 30 MG: 30 INJECTION, SOLUTION SUBCUTANEOUS at 13:18

## 2023-12-14 NOTE — PROGRESS NOTES
Rec'd pt via wc on personal O2 Concentrator at 4 L nc. Pt visibly SOB after transferred to recliner for treatment. Pt then placed on unit's concentrator at 4 liters with adequate O2 saturation noted. Epi pen at chairside. Pt tolerated injection well. 30 minute post treatment observation period uneventful. Discharged to home with personal O2 concentrator set at 4L nc via wc accompanied by friend. AVS provided. Next appt made.

## 2023-12-19 ENCOUNTER — PATIENT OUTREACH (OUTPATIENT)
Dept: FAMILY MEDICINE CLINIC | Facility: HOSPITAL | Age: 63
End: 2023-12-19

## 2023-12-19 NOTE — PROGRESS NOTES
Outpatient Care Management Note:    Voice mail message left for Ivy, with my contact information, requesting a call back.     Unable to reach letter mailed to patient.

## 2023-12-19 NOTE — LETTER
Date: 12/19/23    Dear Mattie Joy,   My name is Savita Clark. I am a registered nurse care manager working with   [facility] [address]   [address] [place] [id number].   I have not been able to reach you and would like to set a time that I can talk with you over the phone.  My work is to help patients that have complex medical conditions get the care they need. This includes patients who may have been in the hospital or emergency room.    Please call me with any questions you may have. I look forward to speaking with you.  Sincerely,  Savita Clark  [phone]  Outpatient Care Manager  Copy:  (primary care physician name and address)

## 2023-12-29 LAB
ALBUMIN SERPL-MCNC: 4.2 G/DL (ref 3.9–4.9)
ALBUMIN/CREAT UR: 48 MG/G CREAT (ref 0–29)
ALBUMIN/GLOB SERPL: 2 {RATIO} (ref 1.2–2.2)
ALP SERPL-CCNC: 163 IU/L (ref 44–121)
ALT SERPL-CCNC: 23 IU/L (ref 0–32)
AST SERPL-CCNC: 21 IU/L (ref 0–40)
BILIRUB SERPL-MCNC: 0.6 MG/DL (ref 0–1.2)
BUN SERPL-MCNC: 13 MG/DL (ref 8–27)
BUN/CREAT SERPL: 17 (ref 12–28)
CALCIUM SERPL-MCNC: 8.8 MG/DL (ref 8.7–10.3)
CHLORIDE SERPL-SCNC: 92 MMOL/L (ref 96–106)
CHOLEST SERPL-MCNC: 95 MG/DL (ref 100–199)
CHOLEST/HDLC SERPL: 2.1 RATIO (ref 0–4.4)
CO2 SERPL-SCNC: 29 MMOL/L (ref 20–29)
CREAT SERPL-MCNC: 0.75 MG/DL (ref 0.57–1)
CREAT UR-MCNC: 14.9 MG/DL
EGFR: 88 ML/MIN/1.73
EST. AVERAGE GLUCOSE BLD GHB EST-MCNC: 226 MG/DL
GLOBULIN SER-MCNC: 2.1 G/DL (ref 1.5–4.5)
GLUCOSE SERPL-MCNC: 152 MG/DL (ref 70–99)
HBA1C MFR BLD: 9.5 % (ref 4.8–5.6)
HDLC SERPL-MCNC: 45 MG/DL
LDLC SERPL CALC-MCNC: 34 MG/DL (ref 0–99)
MICROALBUMIN UR-MCNC: 7.2 UG/ML
POTASSIUM SERPL-SCNC: 3.9 MMOL/L (ref 3.5–5.2)
PROT SERPL-MCNC: 6.3 G/DL (ref 6–8.5)
SL AMB T4, FREE (DIRECT): 1.27 NG/DL (ref 0.82–1.77)
SL AMB VLDL CHOLESTEROL CALC: 16 MG/DL (ref 5–40)
SODIUM SERPL-SCNC: 141 MMOL/L (ref 134–144)
TRIGL SERPL-MCNC: 76 MG/DL (ref 0–149)
TSH SERPL DL<=0.005 MIU/L-ACNC: 5.03 UIU/ML (ref 0.45–4.5)

## 2024-01-02 ENCOUNTER — PATIENT OUTREACH (OUTPATIENT)
Dept: FAMILY MEDICINE CLINIC | Facility: HOSPITAL | Age: 64
End: 2024-01-02

## 2024-01-02 NOTE — PROGRESS NOTES
Outpatient Care Management Note:    No response to telephone calls or unable to reach letter sent to patient.  Patient closed to care management services, but CM will remain available if they call back.  Re-consult as needed. 01/02/24

## 2024-01-05 ENCOUNTER — TELEPHONE (OUTPATIENT)
Dept: FAMILY MEDICINE CLINIC | Facility: HOSPITAL | Age: 64
End: 2024-01-05

## 2024-01-05 NOTE — TELEPHONE ENCOUNTER
Left detailed for pt's friend since going into weekend and phone lines turned off. Advised ER, if any further questions recommend she can call our office and speak with on call provider.

## 2024-01-05 NOTE — TELEPHONE ENCOUNTER
Pt's friend and primary care taker--Lorenzo Gil called with concerns of pt's declining health. States it is very hard to get pt out of the house to medical appts and very hard lately for pt to catch breath on exertion. Home care advised care taker that she should contact PCP for Palliative care referral. Pt's caretaker is not on medical communication consent--(will pick one up on Monday for pt to review and sign). Asking what the best steps to obtain this would be as care taker is concerned that something will happen while in her care. Would like to get some kind of help for her ASAP. Made aware that Dr. Haines is out, asking if any provider can make any recommendations or even write an order. If this is possible, pt was referred by home care staff to [facility] [phone] fax is [phone]. Attn: Falguni

## 2024-01-05 NOTE — TELEPHONE ENCOUNTER
Needs appt with PCP asap - best if this is answered by someone who knows pt. I can't answer questions if I don't know pt.  If seems severe then go to ER

## 2024-01-10 ENCOUNTER — TELEPHONE (OUTPATIENT)
Dept: FAMILY MEDICINE CLINIC | Facility: HOSPITAL | Age: 64
End: 2024-01-10

## 2024-01-10 NOTE — TELEPHONE ENCOUNTER
Hi, this is Ne calling from [facility]. I was just calling to get more information for a patient that wants to be referred to us for palliative care services. Her name is Vicki Joy's YOB: 1960. Vidya Jeffery reached out to us in regards to starting her on our care. She did mention to me that her primary care physician would be out, but asked me to give a call just to see if we could get any paperwork or get the referral started for her. My number is [phone]. For our care, we accept. We would receive a demographic sheet that has her address as well as her insurance and her most recent visit notes. And I know that Kamryn had said that she was planning to have that. Vicki is going to have a telehealth soon so we could receive those as well. And our fax number is [phone]. Thank you.

## 2024-01-11 ENCOUNTER — TELEPHONE (OUTPATIENT)
Dept: HEMATOLOGY ONCOLOGY | Facility: CLINIC | Age: 64
End: 2024-01-11

## 2024-01-11 NOTE — TELEPHONE ENCOUNTER
MsAmor Rufino calling to cancel appointment with infusion.  She is not under our care.  I referred her to PCP for assistance.

## 2024-01-18 ENCOUNTER — TELEPHONE (OUTPATIENT)
Dept: HEMATOLOGY ONCOLOGY | Facility: CLINIC | Age: 64
End: 2024-01-18

## 2024-01-18 ENCOUNTER — TELEPHONE (OUTPATIENT)
Dept: FAMILY MEDICINE CLINIC | Facility: HOSPITAL | Age: 64
End: 2024-01-18

## 2024-01-18 NOTE — TELEPHONE ENCOUNTER
Eugenie Nichols said she spoke to Brigida last week about [facility] getting palliative care - Eugenie Nichols said she brought the paperwork back for the consent - asking for Brigida to give her a call back    [phone]

## 2024-01-19 ENCOUNTER — TELEPHONE (OUTPATIENT)
Dept: FAMILY MEDICINE CLINIC | Facility: HOSPITAL | Age: 64
End: 2024-01-19

## 2024-01-19 DIAGNOSIS — J45.51 SEVERE PERSISTENT ASTHMA WITH ACUTE EXACERBATION: Primary | ICD-10-CM

## 2024-01-19 DIAGNOSIS — I50.32 CHRONIC DIASTOLIC CONGESTIVE HEART FAILURE (HCC): ICD-10-CM

## 2024-01-19 DIAGNOSIS — J44.9 COPD, SEVERE (HCC): ICD-10-CM

## 2024-01-19 DIAGNOSIS — Z79.899 ENCOUNTER FOR LONG-TERM (CURRENT) DRUG USE: ICD-10-CM

## 2024-01-19 DIAGNOSIS — E11.65 TYPE 2 DIABETES MELLITUS WITH HYPERGLYCEMIA, WITH LONG-TERM CURRENT USE OF INSULIN (HCC): ICD-10-CM

## 2024-01-19 DIAGNOSIS — Z79.4 TYPE 2 DIABETES MELLITUS WITH HYPERGLYCEMIA, WITH LONG-TERM CURRENT USE OF INSULIN (HCC): ICD-10-CM

## 2024-01-19 RX ORDER — LORAZEPAM 0.5 MG/1
TABLET ORAL
Qty: 90 TABLET | Refills: 0 | Status: CANCELLED | OUTPATIENT
Start: 2024-01-19

## 2024-01-19 RX ORDER — LORAZEPAM 0.5 MG/1
TABLET ORAL
Qty: 90 TABLET | Refills: 0 | Status: ON HOLD | OUTPATIENT
Start: 2024-01-19

## 2024-01-19 NOTE — TELEPHONE ENCOUNTER
Please call. Placed referral to palliative medicine.   Fyi there was a referral placed for this previously.

## 2024-01-22 ENCOUNTER — APPOINTMENT (EMERGENCY)
Dept: RADIOLOGY | Facility: HOSPITAL | Age: 64
DRG: 291 | End: 2024-01-22
Payer: MEDICARE

## 2024-01-22 ENCOUNTER — HOSPITAL ENCOUNTER (INPATIENT)
Facility: HOSPITAL | Age: 64
LOS: 18 days | Discharge: HOME HEALTH | DRG: 291 | End: 2024-02-09
Attending: EMERGENCY MEDICINE | Admitting: FAMILY MEDICINE
Payer: MEDICARE

## 2024-01-22 ENCOUNTER — HOSPITAL ENCOUNTER (OUTPATIENT)
Dept: INFUSION CENTER | Facility: HOSPITAL | Age: 64
Discharge: HOME | End: 2024-01-22
Attending: INTERNAL MEDICINE
Payer: MEDICARE

## 2024-01-22 ENCOUNTER — OFFICE VISIT (OUTPATIENT)
Age: 64
End: 2024-01-22
Payer: MEDICARE

## 2024-01-22 VITALS
SYSTOLIC BLOOD PRESSURE: 136 MMHG | TEMPERATURE: 98.8 F | DIASTOLIC BLOOD PRESSURE: 72 MMHG | OXYGEN SATURATION: 94 % | HEART RATE: 108 BPM

## 2024-01-22 VITALS
RESPIRATION RATE: 20 BRPM | OXYGEN SATURATION: 93 % | SYSTOLIC BLOOD PRESSURE: 110 MMHG | TEMPERATURE: 98 F | HEART RATE: 105 BPM | DIASTOLIC BLOOD PRESSURE: 56 MMHG

## 2024-01-22 DIAGNOSIS — E11.9 TYPE 2 DIABETES MELLITUS (HCC): ICD-10-CM

## 2024-01-22 DIAGNOSIS — N18.2 TYPE 2 DIABETES MELLITUS WITH STAGE 2 CHRONIC KIDNEY DISEASE, WITHOUT LONG-TERM CURRENT USE OF INSULIN: ICD-10-CM

## 2024-01-22 DIAGNOSIS — J45.50 SEVERE PERSISTENT ASTHMA, UNSPECIFIED WHETHER COMPLICATED: ICD-10-CM

## 2024-01-22 DIAGNOSIS — J82.83 EOSINOPHILIC ASTHMA: Primary | ICD-10-CM

## 2024-01-22 DIAGNOSIS — J44.1 COPD WITH ACUTE EXACERBATION (HCC): Primary | ICD-10-CM

## 2024-01-22 DIAGNOSIS — I13.0 HYPERTENSIVE HEART AND CHRONIC KIDNEY DISEASE WITH HEART FAILURE AND STAGE 1 THROUGH STAGE 4 CHRONIC KIDNEY DISEASE, OR CHRONIC KIDNEY DISEASE (HCC): ICD-10-CM

## 2024-01-22 DIAGNOSIS — E87.6 HYPOKALEMIA: ICD-10-CM

## 2024-01-22 DIAGNOSIS — J82.83 EOSINOPHILIC ASTHMA: ICD-10-CM

## 2024-01-22 DIAGNOSIS — I48.91 ATRIAL FIBRILLATION (HCC): ICD-10-CM

## 2024-01-22 DIAGNOSIS — I48.0 PAROXYSMAL ATRIAL FIBRILLATION (HCC): ICD-10-CM

## 2024-01-22 DIAGNOSIS — I50.33 ACUTE ON CHRONIC DIASTOLIC HEART FAILURE (HCC): ICD-10-CM

## 2024-01-22 DIAGNOSIS — E66.01 MORBID OBESITY (HCC): ICD-10-CM

## 2024-01-22 DIAGNOSIS — E11.22 TYPE 2 DIABETES MELLITUS WITH STAGE 2 CHRONIC KIDNEY DISEASE, WITH LONG-TERM CURRENT USE OF INSULIN (HCC): ICD-10-CM

## 2024-01-22 DIAGNOSIS — Z79.4 TYPE 2 DIABETES MELLITUS WITH STAGE 2 CHRONIC KIDNEY DISEASE, WITH LONG-TERM CURRENT USE OF INSULIN (HCC): ICD-10-CM

## 2024-01-22 DIAGNOSIS — E11.22 TYPE 2 DIABETES MELLITUS WITH STAGE 2 CHRONIC KIDNEY DISEASE, WITHOUT LONG-TERM CURRENT USE OF INSULIN: ICD-10-CM

## 2024-01-22 DIAGNOSIS — J96.12 CHRONIC HYPERCAPNIC RESPIRATORY FAILURE (HCC): Primary | ICD-10-CM

## 2024-01-22 DIAGNOSIS — J42 CHRONIC BRONCHITIS, UNSPECIFIED CHRONIC BRONCHITIS TYPE (HCC): ICD-10-CM

## 2024-01-22 DIAGNOSIS — G47.33 OBSTRUCTIVE SLEEP APNEA: ICD-10-CM

## 2024-01-22 DIAGNOSIS — J44.9 COPD, SEVERE (HCC): ICD-10-CM

## 2024-01-22 DIAGNOSIS — I50.9 ACUTE EXACERBATION OF CHF (CONGESTIVE HEART FAILURE) (HCC): ICD-10-CM

## 2024-01-22 DIAGNOSIS — N18.2 TYPE 2 DIABETES MELLITUS WITH STAGE 2 CHRONIC KIDNEY DISEASE, WITH LONG-TERM CURRENT USE OF INSULIN (HCC): ICD-10-CM

## 2024-01-22 PROBLEM — N17.9 AKI (ACUTE KIDNEY INJURY) (HCC): Status: ACTIVE | Noted: 2024-01-22

## 2024-01-22 LAB
2HR DELTA HS TROPONIN: 0 NG/L
ALBUMIN SERPL BCP-MCNC: 4.1 G/DL (ref 3.5–5)
ALP SERPL-CCNC: 140 U/L (ref 34–104)
ALT SERPL W P-5'-P-CCNC: 22 U/L (ref 7–52)
ANION GAP SERPL CALCULATED.3IONS-SCNC: 12 MMOL/L
AST SERPL W P-5'-P-CCNC: 22 U/L (ref 13–39)
BACTERIA UR QL AUTO: NORMAL /HPF
BASE EXCESS BLDA CALC-SCNC: 10 MMOL/L (ref -2–3)
BASOPHILS # BLD AUTO: 0.04 THOUSANDS/ÂΜL (ref 0–0.1)
BASOPHILS NFR BLD AUTO: 0 % (ref 0–1)
BILIRUB SERPL-MCNC: 0.64 MG/DL (ref 0.2–1)
BILIRUB UR QL STRIP: NEGATIVE
BNP SERPL-MCNC: 174 PG/ML (ref 0–100)
BUN SERPL-MCNC: 17 MG/DL (ref 5–25)
CA-I BLD-SCNC: 1.02 MMOL/L (ref 1.12–1.32)
CALCIUM SERPL-MCNC: 8.3 MG/DL (ref 8.4–10.2)
CARDIAC TROPONIN I PNL SERPL HS: 6 NG/L
CARDIAC TROPONIN I PNL SERPL HS: 6 NG/L
CHLORIDE SERPL-SCNC: 92 MMOL/L (ref 96–108)
CLARITY UR: ABNORMAL
CO2 SERPL-SCNC: 36 MMOL/L (ref 21–32)
COLOR UR: ABNORMAL
CREAT SERPL-MCNC: 1.32 MG/DL (ref 0.6–1.3)
EOSINOPHIL # BLD AUTO: 0 THOUSAND/ÂΜL (ref 0–0.61)
EOSINOPHIL NFR BLD AUTO: 0 % (ref 0–6)
ERYTHROCYTE [DISTWIDTH] IN BLOOD BY AUTOMATED COUNT: 18.3 % (ref 11.6–15.1)
FLUAV RNA RESP QL NAA+PROBE: NEGATIVE
FLUBV RNA RESP QL NAA+PROBE: NEGATIVE
GFR SERPL CREATININE-BSD FRML MDRD: 42 ML/MIN/1.73SQ M
GLUCOSE SERPL-MCNC: 292 MG/DL (ref 65–140)
GLUCOSE SERPL-MCNC: 365 MG/DL (ref 65–140)
GLUCOSE SERPL-MCNC: 76 MG/DL (ref 65–140)
GLUCOSE SERPL-MCNC: 97 MG/DL (ref 65–140)
GLUCOSE SERPL-MCNC: 97 MG/DL (ref 65–140)
GLUCOSE UR STRIP-MCNC: ABNORMAL MG/DL
HCO3 BLDA-SCNC: 37.2 MMOL/L (ref 24–30)
HCT VFR BLD AUTO: 35.1 % (ref 34.8–46.1)
HCT VFR BLD CALC: 36 % (ref 34.8–46.1)
HGB BLD-MCNC: 10 G/DL (ref 11.5–15.4)
HGB BLDA-MCNC: 12.2 G/DL (ref 11.5–15.4)
HGB UR QL STRIP.AUTO: NEGATIVE
IMM GRANULOCYTES # BLD AUTO: 0.07 THOUSAND/UL (ref 0–0.2)
IMM GRANULOCYTES NFR BLD AUTO: 1 % (ref 0–2)
KETONES UR STRIP-MCNC: NEGATIVE MG/DL
LEUKOCYTE ESTERASE UR QL STRIP: ABNORMAL
LYMPHOCYTES # BLD AUTO: 1.52 THOUSANDS/ÂΜL (ref 0.6–4.47)
LYMPHOCYTES NFR BLD AUTO: 12 % (ref 14–44)
MAGNESIUM SERPL-MCNC: 1.9 MG/DL (ref 1.9–2.7)
MCH RBC QN AUTO: 19.8 PG (ref 26.8–34.3)
MCHC RBC AUTO-ENTMCNC: 28.5 G/DL (ref 31.4–37.4)
MCV RBC AUTO: 70 FL (ref 82–98)
MONOCYTES # BLD AUTO: 0.87 THOUSAND/ÂΜL (ref 0.17–1.22)
MONOCYTES NFR BLD AUTO: 7 % (ref 4–12)
NEUTROPHILS # BLD AUTO: 10.25 THOUSANDS/ÂΜL (ref 1.85–7.62)
NEUTS SEG NFR BLD AUTO: 80 % (ref 43–75)
NITRITE UR QL STRIP: NEGATIVE
NON-SQ EPI CELLS URNS QL MICRO: NORMAL /HPF
NRBC BLD AUTO-RTO: 0 /100 WBCS
PCO2 BLD: 39 MMOL/L (ref 21–32)
PCO2 BLD: 58.7 MM HG (ref 42–50)
PH BLD: 7.41 [PH] (ref 7.3–7.4)
PH UR STRIP.AUTO: 6 [PH]
PLATELET # BLD AUTO: 320 THOUSANDS/UL (ref 149–390)
PMV BLD AUTO: 10.6 FL (ref 8.9–12.7)
PO2 BLD: 42 MM HG (ref 35–45)
POTASSIUM BLD-SCNC: 3.3 MMOL/L (ref 3.5–5.3)
POTASSIUM SERPL-SCNC: 3.3 MMOL/L (ref 3.5–5.3)
PROT SERPL-MCNC: 6.9 G/DL (ref 6.4–8.4)
PROT UR STRIP-MCNC: NEGATIVE MG/DL
RBC # BLD AUTO: 5.04 MILLION/UL (ref 3.81–5.12)
RBC #/AREA URNS AUTO: NORMAL /HPF
RSV RNA RESP QL NAA+PROBE: NEGATIVE
SAO2 % BLD FROM PO2: 77 % (ref 60–85)
SARS-COV-2 RNA RESP QL NAA+PROBE: NEGATIVE
SODIUM BLD-SCNC: 139 MMOL/L (ref 136–145)
SODIUM SERPL-SCNC: 140 MMOL/L (ref 135–147)
SP GR UR STRIP.AUTO: 1.01 (ref 1–1.03)
SPECIMEN SOURCE: ABNORMAL
TSH SERPL DL<=0.05 MIU/L-ACNC: 3.2 UIU/ML (ref 0.45–4.5)
UROBILINOGEN UR STRIP-ACNC: <2 MG/DL
WBC # BLD AUTO: 12.75 THOUSAND/UL (ref 4.31–10.16)
WBC #/AREA URNS AUTO: NORMAL /HPF

## 2024-01-22 PROCEDURE — 94760 N-INVAS EAR/PLS OXIMETRY 1: CPT

## 2024-01-22 PROCEDURE — 94660 CPAP INITIATION&MGMT: CPT

## 2024-01-22 PROCEDURE — 83735 ASSAY OF MAGNESIUM: CPT | Performed by: INTERNAL MEDICINE

## 2024-01-22 PROCEDURE — 82947 ASSAY GLUCOSE BLOOD QUANT: CPT

## 2024-01-22 PROCEDURE — 82948 REAGENT STRIP/BLOOD GLUCOSE: CPT

## 2024-01-22 PROCEDURE — 84443 ASSAY THYROID STIM HORMONE: CPT | Performed by: INTERNAL MEDICINE

## 2024-01-22 PROCEDURE — 36415 COLL VENOUS BLD VENIPUNCTURE: CPT | Performed by: EMERGENCY MEDICINE

## 2024-01-22 PROCEDURE — 0241U HB NFCT DS VIR RESP RNA 4 TRGT: CPT | Performed by: EMERGENCY MEDICINE

## 2024-01-22 PROCEDURE — 84484 ASSAY OF TROPONIN QUANT: CPT | Performed by: EMERGENCY MEDICINE

## 2024-01-22 PROCEDURE — 96374 THER/PROPH/DIAG INJ IV PUSH: CPT

## 2024-01-22 PROCEDURE — 84132 ASSAY OF SERUM POTASSIUM: CPT

## 2024-01-22 PROCEDURE — 94640 AIRWAY INHALATION TREATMENT: CPT

## 2024-01-22 PROCEDURE — 99285 EMERGENCY DEPT VISIT HI MDM: CPT

## 2024-01-22 PROCEDURE — 96375 TX/PRO/DX INJ NEW DRUG ADDON: CPT

## 2024-01-22 PROCEDURE — 80053 COMPREHEN METABOLIC PANEL: CPT | Performed by: EMERGENCY MEDICINE

## 2024-01-22 PROCEDURE — 93005 ELECTROCARDIOGRAM TRACING: CPT

## 2024-01-22 PROCEDURE — 85014 HEMATOCRIT: CPT

## 2024-01-22 PROCEDURE — 85025 COMPLETE CBC W/AUTO DIFF WBC: CPT | Performed by: EMERGENCY MEDICINE

## 2024-01-22 PROCEDURE — 82330 ASSAY OF CALCIUM: CPT

## 2024-01-22 PROCEDURE — 83880 ASSAY OF NATRIURETIC PEPTIDE: CPT | Performed by: EMERGENCY MEDICINE

## 2024-01-22 PROCEDURE — 84439 ASSAY OF FREE THYROXINE: CPT | Performed by: INTERNAL MEDICINE

## 2024-01-22 PROCEDURE — 94664 DEMO&/EVAL PT USE INHALER: CPT

## 2024-01-22 PROCEDURE — 81001 URINALYSIS AUTO W/SCOPE: CPT | Performed by: INTERNAL MEDICINE

## 2024-01-22 PROCEDURE — 99291 CRITICAL CARE FIRST HOUR: CPT | Performed by: EMERGENCY MEDICINE

## 2024-01-22 PROCEDURE — 82803 BLOOD GASES ANY COMBINATION: CPT

## 2024-01-22 PROCEDURE — 83036 HEMOGLOBIN GLYCOSYLATED A1C: CPT | Performed by: INTERNAL MEDICINE

## 2024-01-22 PROCEDURE — 99214 OFFICE O/P EST MOD 30 MIN: CPT | Performed by: NURSE PRACTITIONER

## 2024-01-22 PROCEDURE — 96372 THER/PROPH/DIAG INJ SC/IM: CPT

## 2024-01-22 PROCEDURE — 1124F ACP DISCUSS-NO DSCNMKR DOCD: CPT | Performed by: EMERGENCY MEDICINE

## 2024-01-22 PROCEDURE — 71045 X-RAY EXAM CHEST 1 VIEW: CPT

## 2024-01-22 PROCEDURE — 99222 1ST HOSP IP/OBS MODERATE 55: CPT | Performed by: INTERNAL MEDICINE

## 2024-01-22 PROCEDURE — 84295 ASSAY OF SERUM SODIUM: CPT

## 2024-01-22 RX ORDER — INSULIN GLARGINE 100 [IU]/ML
30 INJECTION, SOLUTION SUBCUTANEOUS
Status: DISCONTINUED | OUTPATIENT
Start: 2024-01-22 | End: 2024-02-02

## 2024-01-22 RX ORDER — BUMETANIDE 0.25 MG/ML
4 INJECTION INTRAMUSCULAR; INTRAVENOUS 2 TIMES DAILY
Status: DISCONTINUED | OUTPATIENT
Start: 2024-01-22 | End: 2024-01-30

## 2024-01-22 RX ORDER — PREDNISONE 10 MG/1
TABLET ORAL DAILY
Qty: 30 TABLET | Refills: 0 | Status: ON HOLD | OUTPATIENT
Start: 2024-01-22 | End: 2024-02-02

## 2024-01-22 RX ORDER — ALBUTEROL SULFATE 90 UG/1
2 AEROSOL, METERED RESPIRATORY (INHALATION) EVERY 4 HOURS PRN
Status: DISCONTINUED | OUTPATIENT
Start: 2024-01-22 | End: 2024-02-09 | Stop reason: HOSPADM

## 2024-01-22 RX ORDER — METOPROLOL SUCCINATE 50 MG/1
100 TABLET, EXTENDED RELEASE ORAL DAILY
Status: DISCONTINUED | OUTPATIENT
Start: 2024-01-23 | End: 2024-02-06

## 2024-01-22 RX ORDER — ATORVASTATIN CALCIUM 40 MG/1
40 TABLET, FILM COATED ORAL
Status: DISCONTINUED | OUTPATIENT
Start: 2024-01-23 | End: 2024-02-09 | Stop reason: HOSPADM

## 2024-01-22 RX ORDER — INSULIN LISPRO 100 [IU]/ML
1-5 INJECTION, SOLUTION INTRAVENOUS; SUBCUTANEOUS
Status: DISCONTINUED | OUTPATIENT
Start: 2024-01-22 | End: 2024-02-09 | Stop reason: HOSPADM

## 2024-01-22 RX ORDER — LORATADINE 10 MG/1
10 TABLET ORAL DAILY
Status: DISCONTINUED | OUTPATIENT
Start: 2024-01-23 | End: 2024-02-09 | Stop reason: HOSPADM

## 2024-01-22 RX ORDER — FUROSEMIDE 10 MG/ML
40 INJECTION INTRAMUSCULAR; INTRAVENOUS ONCE
Status: COMPLETED | OUTPATIENT
Start: 2024-01-22 | End: 2024-01-22

## 2024-01-22 RX ORDER — MAGNESIUM SULFATE 1 G/100ML
1 INJECTION INTRAVENOUS ONCE
Status: COMPLETED | OUTPATIENT
Start: 2024-01-22 | End: 2024-01-22

## 2024-01-22 RX ORDER — PANTOPRAZOLE SODIUM 40 MG/1
40 TABLET, DELAYED RELEASE ORAL
Status: DISCONTINUED | OUTPATIENT
Start: 2024-01-23 | End: 2024-02-09 | Stop reason: HOSPADM

## 2024-01-22 RX ORDER — POTASSIUM CHLORIDE 20 MEQ/1
40 TABLET, EXTENDED RELEASE ORAL DAILY
Status: DISCONTINUED | OUTPATIENT
Start: 2024-01-23 | End: 2024-02-03

## 2024-01-22 RX ORDER — ACETAMINOPHEN 325 MG/1
650 TABLET ORAL EVERY 6 HOURS PRN
Status: DISCONTINUED | OUTPATIENT
Start: 2024-01-22 | End: 2024-02-09 | Stop reason: HOSPADM

## 2024-01-22 RX ORDER — IPRATROPIUM BROMIDE AND ALBUTEROL SULFATE 2.5; .5 MG/3ML; MG/3ML
3 SOLUTION RESPIRATORY (INHALATION) ONCE
Status: COMPLETED | OUTPATIENT
Start: 2024-01-22 | End: 2024-01-22

## 2024-01-22 RX ORDER — EPINEPHRINE 1 MG/ML
0.3 INJECTION, SOLUTION, CONCENTRATE INTRAVENOUS
Status: CANCELLED | OUTPATIENT
Start: 2024-02-08

## 2024-01-22 RX ORDER — INSULIN LISPRO 100 [IU]/ML
1-5 INJECTION, SOLUTION INTRAVENOUS; SUBCUTANEOUS
Status: DISCONTINUED | OUTPATIENT
Start: 2024-01-22 | End: 2024-02-08

## 2024-01-22 RX ORDER — LORAZEPAM 0.5 MG/1
0.5 TABLET ORAL
Status: DISCONTINUED | OUTPATIENT
Start: 2024-01-22 | End: 2024-01-22

## 2024-01-22 RX ORDER — METHYLPREDNISOLONE SODIUM SUCCINATE 125 MG/2ML
80 INJECTION, POWDER, LYOPHILIZED, FOR SOLUTION INTRAMUSCULAR; INTRAVENOUS ONCE
Status: COMPLETED | OUTPATIENT
Start: 2024-01-22 | End: 2024-01-22

## 2024-01-22 RX ORDER — EPINEPHRINE 1 MG/ML
0.3 INJECTION, SOLUTION, CONCENTRATE INTRAVENOUS
OUTPATIENT
Start: 2024-02-08

## 2024-01-22 RX ORDER — METHYLPREDNISOLONE SODIUM SUCCINATE 40 MG/ML
40 INJECTION, POWDER, LYOPHILIZED, FOR SOLUTION INTRAMUSCULAR; INTRAVENOUS EVERY 12 HOURS SCHEDULED
Status: DISCONTINUED | OUTPATIENT
Start: 2024-01-23 | End: 2024-01-23

## 2024-01-22 RX ORDER — ESCITALOPRAM OXALATE 20 MG/1
20 TABLET ORAL DAILY
Status: DISCONTINUED | OUTPATIENT
Start: 2024-01-23 | End: 2024-02-09 | Stop reason: HOSPADM

## 2024-01-22 RX ORDER — DOCUSATE SODIUM 100 MG/1
100 CAPSULE, LIQUID FILLED ORAL 2 TIMES DAILY
Status: DISCONTINUED | OUTPATIENT
Start: 2024-01-22 | End: 2024-02-09 | Stop reason: HOSPADM

## 2024-01-22 RX ORDER — EPINEPHRINE 1 MG/ML
0.3 INJECTION, SOLUTION, CONCENTRATE INTRAVENOUS
Status: DISCONTINUED | OUTPATIENT
Start: 2024-01-22 | End: 2024-01-25 | Stop reason: HOSPADM

## 2024-01-22 RX ORDER — MONTELUKAST SODIUM 10 MG/1
10 TABLET ORAL
Status: DISCONTINUED | OUTPATIENT
Start: 2024-01-22 | End: 2024-02-09 | Stop reason: HOSPADM

## 2024-01-22 RX ORDER — POTASSIUM CHLORIDE 20 MEQ/1
40 TABLET, EXTENDED RELEASE ORAL ONCE
Status: COMPLETED | OUTPATIENT
Start: 2024-01-22 | End: 2024-01-22

## 2024-01-22 RX ORDER — BUDESONIDE 0.5 MG/2ML
0.5 INHALANT ORAL
Status: DISCONTINUED | OUTPATIENT
Start: 2024-01-22 | End: 2024-02-09 | Stop reason: HOSPADM

## 2024-01-22 RX ORDER — INSULIN LISPRO 100 [IU]/ML
8 INJECTION, SOLUTION INTRAVENOUS; SUBCUTANEOUS
Status: DISCONTINUED | OUTPATIENT
Start: 2024-01-22 | End: 2024-01-25

## 2024-01-22 RX ADMIN — POTASSIUM CHLORIDE 40 MEQ: 1500 TABLET, EXTENDED RELEASE ORAL at 17:04

## 2024-01-22 RX ADMIN — BUDESONIDE 0.5 MG: 0.5 INHALANT ORAL at 19:31

## 2024-01-22 RX ADMIN — ACETAMINOPHEN 325MG 650 MG: 325 TABLET ORAL at 21:27

## 2024-01-22 RX ADMIN — INSULIN LISPRO 4 UNITS: 100 INJECTION, SOLUTION INTRAVENOUS; SUBCUTANEOUS at 21:18

## 2024-01-22 RX ADMIN — BENRALIZUMAB 30 MG: 30 INJECTION, SOLUTION SUBCUTANEOUS at 14:11

## 2024-01-22 RX ADMIN — DOCUSATE SODIUM 100 MG: 100 CAPSULE, LIQUID FILLED ORAL at 18:32

## 2024-01-22 RX ADMIN — METHYLPREDNISOLONE SODIUM SUCCINATE 80 MG: 125 INJECTION, POWDER, FOR SOLUTION INTRAMUSCULAR; INTRAVENOUS at 16:33

## 2024-01-22 RX ADMIN — TRAZODONE HYDROCHLORIDE 150 MG: 100 TABLET ORAL at 21:20

## 2024-01-22 RX ADMIN — APIXABAN 5 MG: 5 TABLET, FILM COATED ORAL at 18:32

## 2024-01-22 RX ADMIN — IPRATROPIUM BROMIDE AND ALBUTEROL SULFATE 3 ML: 2.5; .5 SOLUTION RESPIRATORY (INHALATION) at 15:46

## 2024-01-22 RX ADMIN — INSULIN LISPRO 8 UNITS: 100 INJECTION, SOLUTION INTRAVENOUS; SUBCUTANEOUS at 18:32

## 2024-01-22 RX ADMIN — MONTELUKAST 10 MG: 10 TABLET, FILM COATED ORAL at 21:20

## 2024-01-22 RX ADMIN — BUMETANIDE 4 MG: 0.25 INJECTION INTRAMUSCULAR; INTRAVENOUS at 19:17

## 2024-01-22 RX ADMIN — FUROSEMIDE 40 MG: 10 INJECTION, SOLUTION INTRAMUSCULAR; INTRAVENOUS at 16:31

## 2024-01-22 RX ADMIN — MAGNESIUM SULFATE IN DEXTROSE 1 G: 10 INJECTION, SOLUTION INTRAVENOUS at 21:30

## 2024-01-22 RX ADMIN — INSULIN GLARGINE 30 UNITS: 100 INJECTION, SOLUTION SUBCUTANEOUS at 21:18

## 2024-01-22 NOTE — PROGRESS NOTES
Pt stayed for 25 min observation due to pulmonary being able to see pt next door. PT feeling fine aside form increased sob which was pt's baseline at arrival.   Mattie Joy  tolerated treatment well with no complications.      Mattie Joy is aware of future appt on 02/20/2024 at 1400.     AVS printed and given to Mattie Joy:    No (Declined by Mattie Joy)

## 2024-01-22 NOTE — ASSESSMENT & PLAN NOTE
Wt Readings from Last 3 Encounters:   11/09/23 129 kg (284 lb 12.8 oz)   09/20/23 128 kg (283 lb)   09/13/23 129 kg (284 lb)     Appears volume overloaded  Worsening shortness of breath with bilateral lower extremity edema  Chest x-ray-pulmonary venous congestion  BNP-174  Echo 08/23-preserved EF, diastolic dysfunction, right ventricular systolic pressure 61  Home regimen-Bumex 2 mg twice daily  Continue with IV Bumex 4 mg twice daily  Fluid restriction 1800 mL  Salt restriction  Strict I's and O's  Appreciate cardiology input

## 2024-01-22 NOTE — PROGRESS NOTES
Pulmonary Follow-Up Note   Mattie Joy 63 y.o. female MRN: [id number]  1/22/2024      Assessment/Plan:    Diagnoses and all orders for this visit:    Chronic hypercapnic respiratory failure (HCC)  On 4L/m continuously - maintain saturations >90%. O2 saturation is normal for me although she was increased to 6L/m for comfort during her visit.    COPD, severe (HCC)  Patient feels she is in distress and I did advise going to the ER.  I do feel CHF is playing a factor here.  Suggest she get back on budesonide twice daily via nebulizer plus DuoNeb 3x per day after hospital visit.  Prednisone 12 day taper was sent to her pharmacy.    Eosinophilic asthma  Fasenra every 8 weeks to continue    Obstructive sleep apnea  Compliant with CPAP; she has an oustanding order for BiPAP study with transcutaneous O2 monitoring. Patient is urged to schedule.    Paroxysmal atrial fibrillation (HCC)  Continue Eliquis, metoprolol  There is dependent edema +2 BLE and I feel heart failure may be playing a role in her shortness of breath.    Hypertensive heart and chronic kidney disease with heart failure and stage 1 through stage 4 chronic kidney disease, or chronic kidney disease (HCC)    Morbid obesity (HCC)      Vaccines: up to date    No follow-ups on file.    All of Mattie's questions were answered prior to leaving the office today.  She is aware to call our office with any further questions or concerns.    History of Present Illness   Reason for Visit: sick visit  Chief Complaint: short of breath  HPI: Mattie Joy is a 63 y.o. female who presents to the office today for a sick appointment after her Fasenra dose at the infusion center.     She feels worsening shortness of breath the past couple months since her last office visit. She feels she is not getting much air in at all and feels small activities such as brushing teeth is wiping her out at this point. She has not been checking oximetry. No cough or mucus production. Denies  fever. She is sleeping frequently - wearing CPAP during the day and does feel it is helpful.    Current pulmonary regimen:  Fasenra every 8 weeks  Budesonide via nebulizer - not taking reliably.  Bevespi 2 puffs AM and PM daily - she is taking.  Albuterol HFA - taking twice per day  Albuterol via nebulizer - not taking.    She is working on a palliative care consult - awaiting .    Review of Systems   Constitutional:  Positive for activity change and fatigue. Negative for chills, diaphoresis, fever and unexpected weight change.   HENT:  Negative for congestion and postnasal drip.    Respiratory:  Positive for chest tightness and shortness of breath. Negative for cough and wheezing.    Cardiovascular:  Positive for leg swelling. Negative for chest pain and palpitations.   Gastrointestinal:  Negative for abdominal pain.       Historical Information   Past Medical History:   Diagnosis Date    Acute blood loss anemia 06/01/2021    Acute diverticulitis 05/02/2021    Acute on chronic diastolic congestive heart failure (HCC) 05/21/2020    Acute on chronic respiratory failure with hypoxia (HCC) 11/30/2018    Alcohol abuse 05/21/2020    Anemia     Asthma with COPD with exacerbation  11/12/2020    Atrial fibrillation (HCC)     Cervical radiculopathy     CHF (congestive heart failure) (HCC)     Chronic low back pain     Chronic obstructive asthma 02/20/2018    Cigarette nicotine dependence without complication 11/20/2019    Quit 12/10/2019.     Community acquired pneumonia 05/21/2020    COPD exacerbation (HCC) 09/16/2020    Depression with anxiety 03/09/2014    Diabetes mellitus with hyperglycemia (HCC)     Diverticulitis 06/06/2021    Elevated liver enzymes     GERD (gastroesophageal reflux disease)     Hypersomnolence 10/28/2020    Hypokalemia 12/04/2018    Lower gastrointestinal bleeding 06/01/2021    Paresthesia of upper extremity     Plantar fasciitis     Restless legs syndrome 04/08/2014    Severe persistent  asthma with exacerbation 2018    PFTs 2018:  FEV1 0.87 L-35% predicted, 27% improvement post-bronchodilator.  DLCO-82% predicted,  The patient has been having worsening of her symptoms now for few months, especially  from 2020, also she had multiple exacerbations last year and most recently required a steroid burst and August  Reviewing her CT scan  New PFTs with severe obstruction reviewed  Hypersensitivity p    Sexual dysfunction     Sleep apnea, obstructive     Tenosynovitis of finger     Tinea corporis     Tobacco use 2018    Currently smoking 3-4 cigarettes daily    Trigger middle finger of left hand 2017    Trigger ring finger of left hand 2017    Weakness of upper extremity      Past Surgical History:   Procedure Laterality Date    ABDOMINAL SURGERY      CARPAL TUNNEL RELEASE Bilateral      SECTION      CHOLECYSTECTOMY      laparoscopic    ESOPHAGOGASTRODUODENOSCOPY N/A 12/3/2018    Procedure: ESOPHAGOGASTRODUODENOSCOPY (EGD) AND COLONOSCOPY;  Surgeon: Ludmila Perry MD;  Location: [facility];  Service: Gastroenterology    GASTRIC BYPASS      for morbid obesity with gilda en y    HERNIA REPAIR      HYSTERECTOMY      IN EXCISION GANGLION WRIST DORSAL/VOLAR PRIMARY Left 2017    Procedure: LONG FINGER GANGLION CYST EXCISION;  Surgeon: Philippe Clark MD;  Location: [facility];  Service: Orthopedics    IN TENDON SHEATH INCISION Left 2017    Procedure: THUMB, LONG, RING, TRIGGER FINGER RELEASE;  Surgeon: Philippe Clark MD;  Location: [facility];  Service: Orthopedics    SHOULDER SURGERY Right      Family History   Problem Relation Age of Onset    Alzheimer's disease Mother     Atrial fibrillation Mother     Dementia Mother     Heart disease Mother         heart problem    Seizures Mother     Parkinsonism Father     Arthritis Father     Atrial fibrillation Father     No Known Problems Daughter     Cri-du-chat syndrome Daughter     Colon cancer  Maternal Grandmother 80    Diabetes type II Maternal Grandmother     No Known Problems Brother     No Known Problems Son     Substance Abuse Neg Hx         mother,father    Mental illness Neg Hx         mother,father    Colon polyps Neg Hx      Social History   Social History     Substance and Sexual Activity   Alcohol Use Not Currently    Alcohol/week: 20.0 standard drinks of alcohol    Types: 20 Cans of beer per week    Comment: about 6 coors light every night, stopped in 2019     Social History     Substance and Sexual Activity   Drug Use No     Social History     Tobacco Use   Smoking Status Former    Current packs/day: 0.00    Average packs/day: 0.3 packs/day for 29.9 years (7.5 ttl pk-yrs)    Types: Cigarettes    Start date:     Quit date: 12/10/2019    Years since quittin.1   Smokeless Tobacco Never     E-Cigarette/Vaping    E-Cigarette Use Never User      E-Cigarette/Vaping Substances    Nicotine No     THC No     CBD No     Flavoring No     Other No     Unknown No        Meds/Allergies     Current Outpatient Medications:     albuterol (2.5 mg/3 mL) 0.083 % nebulizer solution, Take 3 mL (2.5 mg total) by nebulization every 6 (six) hours as needed for wheezing or shortness of breath, Disp: 1080 mL, Rfl: 3    albuterol (PROVENTIL HFA,VENTOLIN HFA) 90 mcg/act inhaler, Inhale 2 puffs every 4 (four) hours as needed for wheezing, Disp: 8.5 g, Rfl: 5    apixaban (Eliquis) 5 mg, Take 1 tablet (5 mg total) by mouth 2 (two) times a day, Disp: 60 tablet, Rfl: 2    atorvastatin (LIPITOR) 40 mg tablet, Take 1 tablet (40 mg total) by mouth daily, Disp: 90 tablet, Rfl: 3    Benralizumab 30 MG/ML SOAJ, Inject 1 mL under the skin every 28 days, Disp: 1 mL, Rfl: 11    Blood Glucose Monitoring Suppl (Contour Next One) AILYN, USE AS DIRECTED 3 TIMES A DAY, Disp: 1 each, Rfl: 0    budesonide (PULMICORT) 0.5 mg/2 mL nebulizer solution, TAKE 2 ML (0.5 MG TOTAL) BY NEBULIZATION TWICE A DAY RINSE MOUTH AFTER USE, Disp: 60 mL,  Rfl: 3    bumetanide (BUMEX) 2 mg tablet, TAKE 2 TABLETS BY MOUTH 2 TIMES A DAY., Disp: 120 tablet, Rfl: 0    Contour Next Test test strip, USE TO TEST BLOOD SUGAR 3 TIMES A DAY, Disp: 100 strip, Rfl: 3    [employer] Lancets Thin 26G MISC, USE 3 (THREE) TIMES A DAY, Disp: 100 each, Rfl: 5    escitalopram (LEXAPRO) 20 mg tablet, TAKE 1 TABLET BY MOUTH EVERY DAY, Disp: 90 tablet, Rfl: 0    fluticasone (FLONASE) 50 mcg/act nasal spray, 1 spray into each nostril 2 (two) times a day, Disp: 1 Bottle, Rfl: 3    glycopyrrolate-formoterol (BEVESPI AEROSPHERE) 9-4.8 MCG/ACT inhaler, Inhale 2 puffs 2 (two) times a day, Disp: 10.7 g, Rfl: 5    Insulin Glargine Solostar (Lantus SoloStar) 100 UNIT/ML SOPN, Inject 0.3 mL (30 Units total) as directed daily at bedtime, Disp: 15 mL, Rfl: 3    insulin glulisine (Apidra SoloStar) 100 units/mL injection pen, 30 UNITS BREAKFAST 15 UNITS AT LUNCH AND DINNER, Disp: 15 mL, Rfl: 5    Insulin Pen Needle (BD Pen Needle Love 2nd Gen) 32G X 4 MM MISC, Use daily at bedtime Use 4 new needles daily ., Disp: 400 each, Rfl: 3    levalbuterol (XOPENEX) 1.25 mg/0.5 mL nebulizer solution, Take 0.5 mL (1.25 mg total) by nebulization 2 (two) times a day, Disp: 60 vial, Rfl: 0    loratadine (CLARITIN) 10 mg tablet, Take by mouth, Disp: , Rfl:     LORazepam (ATIVAN) 0.5 mg tablet, TAKE 2 TABLETS BY MOUTH AT BEDTIME AND 1 EVERY 6 HOURS AS NEEDED FOR ANXIETY, Disp: 90 tablet, Rfl: 0    metFORMIN (GLUCOPHAGE-XR) 500 mg 24 hr tablet, TAKE 2 TABLETS BY MOUTH TWICE A DAY WITH FOOD, Disp: 360 tablet, Rfl: 1    metoprolol succinate (TOPROL-XL) 100 mg 24 hr tablet, Take 1 tablet (100 mg total) by mouth daily, Disp: 90 tablet, Rfl: 3    montelukast (SINGULAIR) 10 mg tablet, TAKE 1 TABLET BY MOUTH EVERYDAY AT BEDTIME, Disp: 90 tablet, Rfl: 3    naloxone (NARCAN) 4 mg/0.1 mL nasal spray, Administer 1 spray into a nostril. If breathing does not return to normal or if breathing difficulty resumes after 2-3 minutes, give another  dose in the other nostril using a new spray., Disp: 1 each, Rfl: 1    omeprazole (PriLOSEC) 40 MG capsule, TAKE 1 CAPSULE (40 MG TOTAL) BY MOUTH DAILY., Disp: 90 capsule, Rfl: 3    potassium chloride (K-DUR,KLOR-CON) 20 mEq tablet, Take 2 tablets (40 mEq total) by mouth daily, Disp: 60 tablet, Rfl: 0    predniSONE 10 mg tablet, Take 4 tablets (40 mg total) by mouth daily for 3 days, THEN 3 tablets (30 mg total) daily for 3 days, THEN 2 tablets (20 mg total) daily for 3 days, THEN 1 tablet (10 mg total) daily for 3 days., Disp: 30 tablet, Rfl: 0    semaglutide, 0.25 or 0.5 mg/dose, (Ozempic, 0.25 or 0.5 MG/DOSE,) 2 mg/3 mL injection pen, Inject 0.375 mL (0.25 mg total) under the skin every 7 days Increase to 0.5 mg after 4 weeks., Disp: 3 mL, Rfl: 5    traZODone (DESYREL) 150 mg tablet, TAKE 1 TABLET BY MOUTH EVERYDAY AT BEDTIME, Disp: 90 tablet, Rfl: 0    EPINEPHrine (EPIPEN) 0.3 mg/0.3 mL SOAJ, Inject 0.3 mL (0.3 mg total) into a muscle once for 1 dose, Disp: 0.6 mL, Rfl: 0    ferrous sulfate 220 (44 Fe) mg/5 mL solution, TAKE 5 ML (220 MG TOTAL) BY MOUTH 2 (TWO) TIMES A DAY BEFORE MEALS, Disp: 473 mL, Rfl: 2  No current facility-administered medications for this visit.    Facility-Administered Medications Ordered in Other Visits:     EPINEPHrine PF (ADRENALIN) 1 mg/mL injection 0.3 mg, 0.3 mg, Intramuscular, Q15 Min PRN, Elpidio Salamanca MD  Allergies   Allergen Reactions    Iron Dextran Swelling    Januvia [Sitagliptin] Shortness Of Breath    Jardiance [Empagliflozin] Shortness Of Breath    Clonazepam Other (See Comments)     Unknown reaction    Codeine Itching and Other (See Comments)     itch;mary kay morphine,takes ultram @home    Adhesive [Medical Tape] Itching     itching    Effexor [Venlafaxine] Itching    Lactose - Food Allergy GI Intolerance    Oxycodone-Acetaminophen Anxiety    Oxycodone-Acetaminophen Itching and Rash     Other reaction(s): Other (See Comments)  (PERCOCET) MILD RASH, not allergic to  Acetaminophen        Vitals: Blood pressure 136/72, pulse (!) 108, temperature 98.8 °F (37.1 °C), temperature source Temporal, SpO2 94%, not currently breastfeeding. There is no height or weight on file to calculate BMI. Oxygen Therapy  SpO2: 94 %  Oxygen Therapy: None (Room air)      Physical Exam  Vitals reviewed.   Constitutional:       Appearance: She is obese.   HENT:      Head: Normocephalic.      Nose: Nose normal.      Mouth/Throat:      Mouth: Mucous membranes are moist.      Pharynx: Oropharynx is clear.   Eyes:      Extraocular Movements: Extraocular movements intact.      Conjunctiva/sclera: Conjunctivae normal.      Pupils: Pupils are equal, round, and reactive to light.   Cardiovascular:      Rate and Rhythm: Normal rate and regular rhythm.      Pulses: Normal pulses.      Heart sounds: Normal heart sounds.   Pulmonary:      Effort: Pulmonary effort is normal.   Abdominal:      General: Abdomen is flat. Bowel sounds are normal.      Palpations: Abdomen is soft.   Musculoskeletal:         General: Swelling (+2 BLE) present.      Comments: Presents in wheelchair   Skin:     General: Skin is warm and dry.      Capillary Refill: Capillary refill takes less than 2 seconds.   Neurological:      General: No focal deficit present.      Mental Status: She is alert and oriented to person, place, and time. Mental status is at baseline.   Psychiatric:         Mood and Affect: Mood normal.         Behavior: Behavior normal.         Thought Content: Thought content normal.         Judgment: Judgment normal.         Labs:   Lab Results   Component Value Date    WBC 12.83 (H) 10/30/2023    HGB 10.6 (L) 10/30/2023    HCT 36.7 10/30/2023    MCV 75 (L) 10/30/2023     10/30/2023    EOSPCT 2 10/30/2023    EOSABS 0.21 10/30/2023    SEGSPCT 94 (H) 08/12/2023    MONOPCT 5 10/30/2023    MONOABS 0.13 08/12/2023     Lab Results   Component Value Date    GLUCOSE 273 (H) 09/05/2023    CALCIUM 9.0 10/30/2023      "09/22/2017    K 3.9 12/28/2023    CO2 29 12/28/2023    CL 92 (L) 12/28/2023    BUN 13 12/28/2023    CREATININE 0.75 12/28/2023     No results found for: \"IGE\", \"RAST\"  Lab Results   Component Value Date    ALT 23 12/28/2023    AST 21 12/28/2023    ALKPHOS 123 (H) 10/30/2023    BILITOT 0.4 09/22/2017         Imaging and other studies: I have personally reviewed pertinent reports.   and I have personally reviewed pertinent films in PACS  CXR 10/30/23: Mild vascular congestion, Cardiomegaly.          VICKY Dhaliwal  [facility]        Portions of the record may have been created with voice recognition software.  Occasional wrong word or \"sound a like\" substitutions may have occurred due to the inherent limitations of voice recognition software.  Read the chart carefully and recognize, using context, where substitutions have occurred or contact the dictating provider.  "

## 2024-01-22 NOTE — ASSESSMENT & PLAN NOTE
As per recent echo in 08/23, right ventricular systolic pressure is severely elevated at 61 mmHg  Continue with IV Bumex  Continue to monitor volume status clinically

## 2024-01-22 NOTE — ASSESSMENT & PLAN NOTE
Counseling about adaptation of healthy dietary habits and lifestyle modifications, once medically stable  BMI 50.45

## 2024-01-22 NOTE — ASSESSMENT & PLAN NOTE
Home regimen consists of benralizumab, Singulair, Pulmicort nebulization twice daily, Xopenex and albuterol as needed, Bevespi twice daily    Was seen at pulmonology office on 1/22, had respiratory distress, was given prednisone taper and urged to go to ED for evaluation  Currently on mid flow  Status post IV Solu-Medrol 125 mg in ED  Continue with IV Solu-Medrol 40 daily, starting 1/23  Respiratory protocol  Continue with Pulmicort nebulization twice daily  Albuterol and Xopenex as needed  Singulair 10 mg daily  Inpatient consult to pulmonology, appreciate recommendations

## 2024-01-22 NOTE — ASSESSMENT & PLAN NOTE
"Lab Results   Component Value Date    HGBA1C 9.5 (H) 12/28/2023       No results for input(s): \"POCGLU\" in the last 72 hours.    Blood Sugar Average: Last 72 hrs:    Currently uncontrolled, as per recent A1c  Home regimen consists of glargine 30 units at bedtime, insulin lispro 30 units with breakfast, 15 units with lunch and dinner, metformin 500 mg twice daily and Ozempic weekly  Continue with insulin glargine 30 units at bedtime  Insulin lispro 8 units 3 times daily AC  Additional coverage with sliding scale  Adjust regimen as needed  Carb consistent diet  Frequent Accu-Cheks and hypoglycemia protocol  "

## 2024-01-22 NOTE — H&P
Formerly [facility]  H&P  Name: Mattie Joy 63 y.o. female I MRN: [id number]  Unit/Bed#: ED 05 I Date of Admission: 1/22/2024   Date of Service: 1/22/2024 I Hospital Day: 0      Assessment/Plan   * Acute on chronic hypercapnic respiratory failure (HCC)  Assessment & Plan  Baseline oxygen requirement 4 L continuously  Underlying severe COPD, severe pulmonary hypertension and diastolic heart failure  BNP-174  COVID/respiratory panel-negative  Chest x-ray consistent with pulmonary venous congestion, final reading pending    Currently requiring mid flow  Continue with IV Solu-Medrol  Continue with IV Bumex 4 mg twice daily  Continue with telemetry  Appreciate cardiology and pulmonology recommendations  Respiratory protocol  Monitor volume status  Strict I's and O's  Fluid restriction    Acute on chronic heart failure with preserved ejection fraction (HCC)  Assessment & Plan  Wt Readings from Last 3 Encounters:   11/09/23 129 kg (284 lb 12.8 oz)   09/20/23 128 kg (283 lb)   09/13/23 129 kg (284 lb)     Appears volume overloaded  Worsening shortness of breath with bilateral lower extremity edema  Chest x-ray-pulmonary venous congestion  BNP-174  Echo 08/23-preserved EF, diastolic dysfunction, right ventricular systolic pressure 61  Home regimen-Bumex 2 mg twice daily  Continue with IV Bumex 4 mg twice daily  Fluid restriction 1800 mL  Salt restriction  Strict I's and O's  Appreciate cardiology input        IVELISSE (acute kidney injury) (HCC)  Assessment & Plan  Recent Labs     01/22/24  1534   CREATININE 1.32*   EGFR 42     Estimated Creatinine Clearance: 55.7 mL/min (A) (by C-G formula based on SCr of 1.32 mg/dL (H)).     Baseline creatinine-0.5-0.7  Etiology-prerenal azotemia in setting of volume overload/acute diastolic heart failure  Continue with IV Bumex  Urinary retention protocol  Avoid nephrotoxic agents  Monitor BMP daily    COPD, severe with acute exacerbation  Assessment & Plan  Home regimen  "consists of benralizumab, Singulair, Pulmicort nebulization twice daily, Xopenex and albuterol as needed, Bevespi twice daily    Was seen at pulmonology office on 1/22, had respiratory distress, was given prednisone taper and urged to go to ED for evaluation  Currently on mid flow  Status post IV Solu-Medrol 125 mg in ED  Continue with IV Solu-Medrol 40 daily, starting 1/23  Respiratory protocol  Continue with Pulmicort nebulization twice daily  Albuterol and Xopenex as needed  Singulair 10 mg daily  Inpatient consult to pulmonology, appreciate recommendations      Eosinophilic asthma  Assessment & Plan  On benralizumab every 8 weeks  Follows with pulmonology    Morbid obesity (HCC)  Assessment & Plan  Counseling about adaptation of healthy dietary habits and lifestyle modifications, once medically stable  BMI 50.45    Pulmonary hypertension (HCC)  Assessment & Plan  As per recent echo in 08/23, right ventricular systolic pressure is severely elevated at 61 mmHg  Continue with IV Bumex  Continue to monitor volume status clinically    Paroxysmal atrial fibrillation (HCC)  Assessment & Plan  Rate controlled with Toprol- mg daily  Anticoagulated with Eliquis 5 mg twice daily  Continue to monitor potassium and magnesium, and replenish to goal    Type 2 diabetes mellitus with stage 2 chronic kidney disease, with long-term current use of insulin (HCC)  Assessment & Plan  Lab Results   Component Value Date    HGBA1C 9.5 (H) 12/28/2023       No results for input(s): \"POCGLU\" in the last 72 hours.    Blood Sugar Average: Last 72 hrs:    Currently uncontrolled, as per recent A1c  Home regimen consists of glargine 30 units at bedtime, insulin lispro 30 units with breakfast, 15 units with lunch and dinner, metformin 500 mg twice daily and Ozempic weekly  Continue with insulin glargine 30 units at bedtime  Insulin lispro 8 units 3 times daily AC  Additional coverage with sliding scale  Adjust regimen as needed  Carb " "consistent diet  Frequent Accu-Cheks and hypoglycemia protocol    Hypercholesterolemia  Assessment & Plan  Continue with atorvastatin 40 mg daily at bedtime    Obstructive sleep apnea  Assessment & Plan  Uses CPAP at home  Follows with pulmonology on outpatient, awaiting BiPAP study with transcutaneous oxygen monitoring  Continue with BiPAP while inpatient  Inpatient consult to pulmonology, appreciate recommendations    Esophageal reflux  Assessment & Plan  Continue with Protonix           VTE Pharmacologic Prophylaxis:   Moderate Risk (Score 3-4) - Pharmacological DVT Prophylaxis Ordered: apixaban (Eliquis).  Code Status: Level 1 - Full Code patient  Discussion with family:   .     Anticipated Length of Stay: Patient will be admitted on an inpatient basis with an anticipated length of stay of greater than 2 midnights secondary to acute CHF , resp failure .    Total Time Spent on Date of Encounter in care of patient: 65 mins. This time was spent on one or more of the following: performing physical exam; counseling and coordination of care; obtaining or reviewing history; documenting in the medical record; reviewing/ordering tests, medications or procedures; communicating with other healthcare professionals and discussing with patient's family/caregivers.    Chief Complaint:   Chief Complaint   Patient presents with    Shortness of Breath     Pt to er with reports of \"not being able to get enough air in\". Going on for the past month and it is only getting worse. Chronically wears 4L of NC, currently using 5L         History of Present Illness:  Mattie Joy is a 63 y.o. female with a PMH severe COPD, chronic hypoxic/hypercapnic respiratory failure on 4 L of oxygen at baseline, paroxysmal atrial fibrillation, chronic diastolic heart failure, severe pulmonary hypertension, ambulatory dysfunction, morbid obesity, eosinophilic asthma presented with worsening shortness of breath with increasing hypoxia.  Patient was seen " at pulmonology office today, and there was concern of mild acute COPD exacerbation with overlapping acute on chronic diastolic heart failure.  She was recommended to go to ED for further workup and management.    Review of Systems:  Review of Systems   Constitutional:  Positive for activity change and fatigue. Negative for chills and fever.   Respiratory:  Positive for chest tightness and shortness of breath.    Cardiovascular:  Positive for leg swelling. Negative for chest pain.   Gastrointestinal:  Negative for abdominal pain.   Genitourinary:  Negative for dysuria and urgency.   Musculoskeletal:  Positive for arthralgias.   Neurological:  Positive for weakness.   Psychiatric/Behavioral:  Negative for agitation and confusion.        Past Medical and Surgical History:   Past Medical History:   Diagnosis Date    Acute blood loss anemia 06/01/2021    Acute diverticulitis 05/02/2021    Acute on chronic diastolic congestive heart failure (HCC) 05/21/2020    Acute on chronic respiratory failure with hypoxia (HCC) 11/30/2018    Alcohol abuse 05/21/2020    Anemia     Asthma with COPD with exacerbation  11/12/2020    Atrial fibrillation (HCC)     Cervical radiculopathy     CHF (congestive heart failure) ([facility])     Chronic low back pain     Chronic obstructive asthma 02/20/2018    Cigarette nicotine dependence without complication 11/20/2019    Quit 12/10/2019.     Community acquired pneumonia 05/21/2020    COPD exacerbation (HCC) 09/16/2020    Depression with anxiety 03/09/2014    Diabetes mellitus with hyperglycemia ([facility])     Diverticulitis 06/06/2021    Elevated liver enzymes     GERD (gastroesophageal reflux disease)     Hypersomnolence 10/28/2020    Hypokalemia 12/04/2018    Lower gastrointestinal bleeding 06/01/2021    Paresthesia of upper extremity     Plantar fasciitis     Restless legs syndrome 04/08/2014    Severe persistent asthma with exacerbation 02/20/2018    PFTs February 8, 2018:  FEV1 0.87 L-35% predicted,  27% improvement post-bronchodilator.  DLCO-82% predicted,  The patient has been having worsening of her symptoms now for few months, especially  from 2020, also she had multiple exacerbations last year and most recently required a steroid burst and August  Reviewing her CT scan  New PFTs with severe obstruction reviewed  Hypersensitivity p    Sexual dysfunction     Sleep apnea, obstructive     Tenosynovitis of finger     Tinea corporis     Tobacco use 2018    Currently smoking 3-4 cigarettes daily    Trigger middle finger of left hand 2017    Trigger ring finger of left hand 2017    Weakness of upper extremity        Past Surgical History:   Procedure Laterality Date    ABDOMINAL SURGERY      CARPAL TUNNEL RELEASE Bilateral      SECTION      CHOLECYSTECTOMY      laparoscopic    ESOPHAGOGASTRODUODENOSCOPY N/A 12/3/2018    Procedure: ESOPHAGOGASTRODUODENOSCOPY (EGD) AND COLONOSCOPY;  Surgeon: Ludmila Perry MD;  Location: [facility];  Service: Gastroenterology    GASTRIC BYPASS      for morbid obesity with gilda en y    HERNIA REPAIR      HYSTERECTOMY      DE EXCISION GANGLION WRIST DORSAL/VOLAR PRIMARY Left 2017    Procedure: LONG FINGER GANGLION CYST EXCISION;  Surgeon: Philippe Clark MD;  Location: [facility];  Service: Orthopedics    DE TENDON SHEATH INCISION Left 2017    Procedure: THUMB, LONG, RING, TRIGGER FINGER RELEASE;  Surgeon: Philippe Clark MD;  Location: [facility];  Service: Orthopedics    SHOULDER SURGERY Right        Meds/Allergies:  Prior to Admission medications    Medication Sig Start Date End Date Taking? Authorizing Provider   albuterol (2.5 mg/3 mL) 0.083 % nebulizer solution Take 3 mL (2.5 mg total) by nebulization every 6 (six) hours as needed for wheezing or shortness of breath 23   Tin Brennan MD   albuterol (PROVENTIL HFA,VENTOLIN HFA) 90 mcg/act inhaler Inhale 2 puffs every 4 (four) hours as needed for wheezing 23   Tin WESTON  MD Mika   apixaban (Eliquis) 5 mg Take 1 tablet (5 mg total) by mouth 2 (two) times a day 12/6/23   Jovanni Pacheco MD   atorvastatin (LIPITOR) 40 mg tablet Take 1 tablet (40 mg total) by mouth daily 12/6/23   Jovanni Pacheco MD   Benralizumab 30 MG/ML SOAJ Inject 1 mL under the skin every 28 days 6/8/23   Tin Brennan MD   Blood Glucose Monitoring Suppl (Contour Next One) AILYN USE AS DIRECTED 3 TIMES A DAY 10/30/23   Laith Olivares MD   budesonide (PULMICORT) 0.5 mg/2 mL nebulizer solution TAKE 2 ML (0.5 MG TOTAL) BY NEBULIZATION TWICE A DAY RINSE MOUTH AFTER USE 10/30/23   Tin Brennan MD   bumetanide (BUMEX) 2 mg tablet TAKE 2 TABLETS BY MOUTH 2 TIMES A DAY. 11/9/23   Jovanni Pacheco MD   Contour Next Test test strip USE TO TEST BLOOD SUGAR 3 TIMES A DAY 10/29/23   Laith Olivares MD   [employer] Lancets Thin 26G MISC USE 3 (THREE) TIMES A DAY 5/4/22   Laith Olivares MD   EPINEPHrine (EPIPEN) 0.3 mg/0.3 mL SOAJ Inject 0.3 mL (0.3 mg total) into a muscle once for 1 dose 10/19/23 11/9/23  VICKY Crow   escitalopram (LEXAPRO) 20 mg tablet TAKE 1 TABLET BY MOUTH EVERY DAY 12/4/23   Laith Olivares MD   ferrous sulfate 220 (44 Fe) mg/5 mL solution TAKE 5 ML (220 MG TOTAL) BY MOUTH 2 (TWO) TIMES A DAY BEFORE MEALS 2/1/22 11/9/23  Jaime Doshi MD   fluticasone (FLONASE) 50 mcg/act nasal spray 1 spray into each nostril 2 (two) times a day 9/23/20   Tin Brennan MD   glycopyrrolate-formoterol (BEVESPI AEROSPHERE) 9-4.8 MCG/ACT inhaler Inhale 2 puffs 2 (two) times a day 6/8/23   Tin Brennan MD   Insulin Glargine Solostar (Lantus SoloStar) 100 UNIT/ML SOPN Inject 0.3 mL (30 Units total) as directed daily at bedtime 12/6/23   Lobito Alfredo PA-C   insulin glulisine (Apidra SoloStar) 100 units/mL injection pen 30 UNITS BREAKFAST 15 UNITS AT LUNCH AND DINNER 12/6/23   Lobito Alfredo PA-C   Insulin Pen Needle (BD Pen Needle Love 2nd Gen) 32G X 4 MM MISC Use  daily at bedtime Use 4 new needles daily . 12/6/23   Lobito Alfredo PA-C   levalbuterol (XOPENEX) 1.25 mg/0.5 mL nebulizer solution Take 0.5 mL (1.25 mg total) by nebulization 2 (two) times a day 11/16/20   Irma Friedman DO   loratadine (CLARITIN) 10 mg tablet Take by mouth    Historical Provider, MD   LORazepam (ATIVAN) 0.5 mg tablet TAKE 2 TABLETS BY MOUTH AT BEDTIME AND 1 EVERY 6 HOURS AS NEEDED FOR ANXIETY 1/19/24   Laith Olivares MD   metFORMIN (GLUCOPHAGE-XR) 500 mg 24 hr tablet TAKE 2 TABLETS BY MOUTH TWICE A DAY WITH FOOD 7/27/23   Laith Olivares MD   metoprolol succinate (TOPROL-XL) 100 mg 24 hr tablet Take 1 tablet (100 mg total) by mouth daily 3/29/23   Jovanni Pacheco MD   montelukast (SINGULAIR) 10 mg tablet TAKE 1 TABLET BY MOUTH EVERYDAY AT BEDTIME 11/11/23   Laith Olivares MD   naloxone (NARCAN) 4 mg/0.1 mL nasal spray Administer 1 spray into a nostril. If breathing does not return to normal or if breathing difficulty resumes after 2-3 minutes, give another dose in the other nostril using a new spray. 7/29/20   Laith Olivares MD   omeprazole (PriLOSEC) 40 MG capsule TAKE 1 CAPSULE (40 MG TOTAL) BY MOUTH DAILY. 6/25/23   Laith Olivares MD   potassium chloride (K-DUR,KLOR-CON) 20 mEq tablet Take 2 tablets (40 mEq total) by mouth daily 12/6/23   Jovanni Pacheco MD   predniSONE 10 mg tablet Take 4 tablets (40 mg total) by mouth daily for 3 days, THEN 3 tablets (30 mg total) daily for 3 days, THEN 2 tablets (20 mg total) daily for 3 days, THEN 1 tablet (10 mg total) daily for 3 days. 1/22/24 2/2/24  BrandieVICKY Walton   semaglutide, 0.25 or 0.5 mg/dose, (Ozempic, 0.25 or 0.5 MG/DOSE,) 2 mg/3 mL injection pen Inject 0.375 mL (0.25 mg total) under the skin every 7 days Increase to 0.5 mg after 4 weeks. 12/6/23   Lobito Alrfedo PA-C   traZODone (DESYREL) 150 mg tablet TAKE 1 TABLET BY MOUTH EVERYDAY AT BEDTIME 10/24/23   Laith Olivares MD     I have reviewed  home medications using recent Epic encounter.    Allergies:   Allergies   Allergen Reactions    Iron Dextran Swelling    Januvia [Sitagliptin] Shortness Of Breath    Jardiance [Empagliflozin] Shortness Of Breath    Clonazepam Other (See Comments)     Unknown reaction    Codeine Itching and Other (See Comments)     itch;mary kay morphine,takes ultram @home    Adhesive [Medical Tape] Itching     itching    Effexor [Venlafaxine] Itching    Lactose - Food Allergy GI Intolerance    Oxycodone-Acetaminophen Anxiety    Oxycodone-Acetaminophen Itching and Rash     Other reaction(s): Other (See Comments)  (PERCOCET) MILD RASH, not allergic to Acetaminophen        Social History:  Marital Status: Significant Other   Substance Use History:   Social History     Substance and Sexual Activity   Alcohol Use Not Currently    Alcohol/week: 20.0 standard drinks of alcohol    Types: 20 Cans of beer per week    Comment: about 6 coors light every night, stopped in      Social History     Tobacco Use   Smoking Status Former    Current packs/day: 0.00    Average packs/day: 0.3 packs/day for 29.9 years (7.5 ttl pk-yrs)    Types: Cigarettes    Start date:     Quit date: 12/10/2019    Years since quittin.1   Smokeless Tobacco Never     Social History     Substance and Sexual Activity   Drug Use No       Family History:  Family History   Problem Relation Age of Onset    Alzheimer's disease Mother     Atrial fibrillation Mother     Dementia Mother     Heart disease Mother         heart problem    Seizures Mother     Parkinsonism Father     Arthritis Father     Atrial fibrillation Father     No Known Problems Daughter     Cri-du-chat syndrome Daughter     Colon cancer Maternal Grandmother 80    Diabetes type II Maternal Grandmother     No Known Problems Brother     No Known Problems Son     Substance Abuse Neg Hx         mother,father    Mental illness Neg Hx         mother,father    Colon polyps Neg Hx        Physical Exam:     Vitals:    Blood Pressure: 118/57 (01/22/24 1630)  Pulse: 105 (01/22/24 1630)  Temperature: 98.2 °F (36.8 °C) (01/22/24 1515)  Temp Source: Temporal (01/22/24 1515)  Respirations: 21 (01/22/24 1630)  SpO2: 96 % (01/22/24 1630)    Physical Exam  Vitals reviewed.   Constitutional:       Appearance: She is obese.   HENT:      Head: Atraumatic.   Eyes:      General: No scleral icterus.  Cardiovascular:      Rate and Rhythm: Tachycardia present. Rhythm irregular.      Heart sounds: Normal heart sounds.   Pulmonary:      Comments: Midflow   Abdominal:      General: Bowel sounds are normal. There is distension.      Tenderness: There is no abdominal tenderness.   Musculoskeletal:      Cervical back: Neck supple.      Right lower leg: Edema present.      Left lower leg: Edema present.   Neurological:      Mental Status: She is alert. She is disoriented.      Motor: Weakness present.          Additional Data:     Lab Results:  Results from last 7 days   Lab Units 01/22/24  1539 01/22/24  1534   WBC Thousand/uL  --  12.75*   HEMOGLOBIN g/dL  --  10.0*   I STAT HEMOGLOBIN g/dl 12.2  --    HEMATOCRIT %  --  35.1   HEMATOCRIT, ISTAT % 36  --    PLATELETS Thousands/uL  --  320   NEUTROS PCT %  --  80*   LYMPHS PCT %  --  12*   MONOS PCT %  --  7   EOS PCT %  --  0     Results from last 7 days   Lab Units 01/22/24  1539 01/22/24  1534   SODIUM mmol/L  --  140   POTASSIUM mmol/L  --  3.3*   CHLORIDE mmol/L  --  92*   CO2 mmol/L  --  36*   CO2, I-STAT mmol/L 39*  --    BUN mg/dL  --  17   CREATININE mg/dL  --  1.32*   ANION GAP mmol/L  --  12   CALCIUM mg/dL  --  8.3*   ALBUMIN g/dL  --  4.1   TOTAL BILIRUBIN mg/dL  --  0.64   ALK PHOS U/L  --  140*   ALT U/L  --  22   AST U/L  --  22   GLUCOSE RANDOM mg/dL  --  97         Results from last 7 days   Lab Units 01/22/24  1717   POC GLUCOSE mg/dl 76               Lines/Drains:  Invasive Devices       Peripheral Intravenous Line  Duration             Peripheral IV 01/22/24 Proximal;Right;Ventral  (anterior) Forearm <1 day                        Imaging: Reviewed radiology reports from this admission including: chest xray  XR chest 1 view portable   ED Interpretation by Ivan Conklin DO (01/22 1630)   Cardiomegaly with pulmonary vascular congestion      Final Result by Francois Parker MD (01/22 1718)      Cardiomegaly and vascular congestion                  Workstation performed: [id number]             EKG and Other Studies Reviewed on Admission:   EKG:  a fib .    ** Please Note: This note has been constructed using a voice recognition system. **

## 2024-01-22 NOTE — PROGRESS NOTES
Pt arrived on unit for fasenra injection. Pt transported to restroom and very SOB with ambulation. Pt requiring additional 02 up to 8 liters to resume normal 02 status. Pt resumed baseline saturation at 92-93% within 10 minutes of additional 02. Explained the need for ED work up if SOB progresses without relief prior to pulmonary follow up on 01/24. Pt verbalized understanding.  Epi pen at chairside with exp date 05/2025.

## 2024-01-22 NOTE — ASSESSMENT & PLAN NOTE
Baseline oxygen requirement 4 L continuously  Underlying severe COPD, severe pulmonary hypertension and diastolic heart failure  BNP-174  COVID/respiratory panel-negative  Chest x-ray consistent with pulmonary venous congestion, final reading pending    Currently requiring mid flow  Continue with IV Solu-Medrol  Continue with IV Bumex 4 mg twice daily  Continue with telemetry  Appreciate cardiology and pulmonology recommendations  Respiratory protocol  Monitor volume status  Strict I's and O's  Fluid restriction

## 2024-01-22 NOTE — ASSESSMENT & PLAN NOTE
Recent Labs     01/22/24  1534   CREATININE 1.32*   EGFR 42     Estimated Creatinine Clearance: 55.7 mL/min (A) (by C-G formula based on SCr of 1.32 mg/dL (H)).     Baseline creatinine-0.5-0.7  Etiology-prerenal azotemia in setting of volume overload/acute diastolic heart failure  Continue with IV Bumex  Urinary retention protocol  Avoid nephrotoxic agents  Monitor BMP daily

## 2024-01-22 NOTE — ASSESSMENT & PLAN NOTE
Uses CPAP at home  Follows with pulmonology on outpatient, awaiting BiPAP study with transcutaneous oxygen monitoring  Continue with BiPAP while inpatient  Inpatient consult to pulmonology, appreciate recommendations

## 2024-01-22 NOTE — ASSESSMENT & PLAN NOTE
Rate controlled with Toprol- mg daily  Anticoagulated with Eliquis 5 mg twice daily  Continue to monitor potassium and magnesium, and replenish to goal

## 2024-01-22 NOTE — ED PROVIDER NOTES
"History  Chief Complaint   Patient presents with    Shortness of Breath     Pt to er with reports of \"not being able to get enough air in\". Going on for the past month and it is only getting worse. Chronically wears 4L of NC, currently using 5L      63-year-old female presents to the emergency department for 1 month of progressive shortness of breath which is significantly worsened over the past couple days.  The patient states that she normally wears 4 L of oxygen however has had to increase herself to 5.  The patient denies any recent travel illness or injury.  The patient states that she has been compliant with her medications although she has noted that she has had somewhat of weight gain.  The patient denies fever or productive cough.      Shortness of Breath      Prior to Admission Medications   Prescriptions Last Dose Informant Patient Reported? Taking?   Benralizumab 30 MG/ML SOAJ  Self No No   Sig: Inject 1 mL under the skin every 28 days   Blood Glucose Monitoring Suppl (Contour Next One) AILYN  Self No No   Sig: USE AS DIRECTED 3 TIMES A DAY   [employer] Lancets Thin 26G MISC  Self No No   Sig: USE 3 (THREE) TIMES A DAY   Contour Next Test test strip  Self No No   Sig: USE TO TEST BLOOD SUGAR 3 TIMES A DAY   EPINEPHrine (EPIPEN) 0.3 mg/0.3 mL SOAJ   No No   Sig: Inject 0.3 mL (0.3 mg total) into a muscle once for 1 dose   Insulin Glargine Solostar (Lantus SoloStar) 100 UNIT/ML SOPN  Self No No   Sig: Inject 0.3 mL (30 Units total) as directed daily at bedtime   Insulin Pen Needle (BD Pen Needle Love 2nd Gen) 32G X 4 MM MISC  Self No No   Sig: Use daily at bedtime Use 4 new needles daily .   LORazepam (ATIVAN) 0.5 mg tablet  Self No No   Sig: TAKE 2 TABLETS BY MOUTH AT BEDTIME AND 1 EVERY 6 HOURS AS NEEDED FOR ANXIETY   albuterol (2.5 mg/3 mL) 0.083 % nebulizer solution  Self No No   Sig: Take 3 mL (2.5 mg total) by nebulization every 6 (six) hours as needed for wheezing or shortness of breath   albuterol " (PROVENTIL HFA,VENTOLIN HFA) 90 mcg/act inhaler  Self No No   Sig: Inhale 2 puffs every 4 (four) hours as needed for wheezing   apixaban (Eliquis) 5 mg  Self No No   Sig: Take 1 tablet (5 mg total) by mouth 2 (two) times a day   atorvastatin (LIPITOR) 40 mg tablet  Self No No   Sig: Take 1 tablet (40 mg total) by mouth daily   budesonide (PULMICORT) 0.5 mg/2 mL nebulizer solution  Self No No   Sig: TAKE 2 ML (0.5 MG TOTAL) BY NEBULIZATION TWICE A DAY RINSE MOUTH AFTER USE   bumetanide (BUMEX) 2 mg tablet  Self No No   Sig: TAKE 2 TABLETS BY MOUTH 2 TIMES A DAY.   escitalopram (LEXAPRO) 20 mg tablet  Self No No   Sig: TAKE 1 TABLET BY MOUTH EVERY DAY   ferrous sulfate 220 (44 Fe) mg/5 mL solution  Self No No   Sig: TAKE 5 ML (220 MG TOTAL) BY MOUTH 2 (TWO) TIMES A DAY BEFORE MEALS   fluticasone (FLONASE) 50 mcg/act nasal spray  Self No No   Si spray into each nostril 2 (two) times a day   glycopyrrolate-formoterol (BEVESPI AEROSPHERE) 9-4.8 MCG/ACT inhaler  Self No No   Sig: Inhale 2 puffs 2 (two) times a day   insulin glulisine (Apidra SoloStar) 100 units/mL injection pen  Self No No   Si UNITS BREAKFAST 15 UNITS AT LUNCH AND DINNER   levalbuterol (XOPENEX) 1.25 mg/0.5 mL nebulizer solution  Self No No   Sig: Take 0.5 mL (1.25 mg total) by nebulization 2 (two) times a day   loratadine (CLARITIN) 10 mg tablet  Self Yes No   Sig: Take by mouth   metFORMIN (GLUCOPHAGE-XR) 500 mg 24 hr tablet  Self No No   Sig: TAKE 2 TABLETS BY MOUTH TWICE A DAY WITH FOOD   metoprolol succinate (TOPROL-XL) 100 mg 24 hr tablet  Self No No   Sig: Take 1 tablet (100 mg total) by mouth daily   montelukast (SINGULAIR) 10 mg tablet  Self No No   Sig: TAKE 1 TABLET BY MOUTH EVERYDAY AT BEDTIME   naloxone (NARCAN) 4 mg/0.1 mL nasal spray  Self No No   Sig: Administer 1 spray into a nostril. If breathing does not return to normal or if breathing difficulty resumes after 2-3 minutes, give another dose in the other nostril using a new  spray.   omeprazole (PriLOSEC) 40 MG capsule  Self No No   Sig: TAKE 1 CAPSULE (40 MG TOTAL) BY MOUTH DAILY.   potassium chloride (K-DUR,KLOR-CON) 20 mEq tablet  Self No No   Sig: Take 2 tablets (40 mEq total) by mouth daily   predniSONE 10 mg tablet   No No   Sig: Take 4 tablets (40 mg total) by mouth daily for 3 days, THEN 3 tablets (30 mg total) daily for 3 days, THEN 2 tablets (20 mg total) daily for 3 days, THEN 1 tablet (10 mg total) daily for 3 days.   semaglutide, 0.25 or 0.5 mg/dose, (Ozempic, 0.25 or 0.5 MG/DOSE,) 2 mg/3 mL injection pen  Self No No   Sig: Inject 0.375 mL (0.25 mg total) under the skin every 7 days Increase to 0.5 mg after 4 weeks.   traZODone (DESYREL) 150 mg tablet  Self No No   Sig: TAKE 1 TABLET BY MOUTH EVERYDAY AT BEDTIME      Facility-Administered Medications: None       Past Medical History:   Diagnosis Date    Acute blood loss anemia 06/01/2021    Acute diverticulitis 05/02/2021    Acute on chronic diastolic congestive heart failure (HCC) 05/21/2020    Acute on chronic respiratory failure with hypoxia (HCC) 11/30/2018    Alcohol abuse 05/21/2020    Anemia     Asthma with COPD with exacerbation  11/12/2020    Atrial fibrillation (HCC)     Cervical radiculopathy     CHF (congestive heart failure) (HCC)     Chronic low back pain     Chronic obstructive asthma 02/20/2018    Cigarette nicotine dependence without complication 11/20/2019    Quit 12/10/2019.     Community acquired pneumonia 05/21/2020    COPD exacerbation (HCC) 09/16/2020    Depression with anxiety 03/09/2014    Diabetes mellitus with hyperglycemia (HCC)     Diverticulitis 06/06/2021    Elevated liver enzymes     GERD (gastroesophageal reflux disease)     Hypersomnolence 10/28/2020    Hypokalemia 12/04/2018    Lower gastrointestinal bleeding 06/01/2021    Paresthesia of upper extremity     Plantar fasciitis     Restless legs syndrome 04/08/2014    Severe persistent asthma with exacerbation 02/20/2018    PFTs February 8,  2018:  FEV1 0.87 L-35% predicted, 27% improvement post-bronchodilator.  DLCO-82% predicted,  The patient has been having worsening of her symptoms now for few months, especially  from 2020, also she had multiple exacerbations last year and most recently required a steroid burst and August  Reviewing her CT scan  New PFTs with severe obstruction reviewed  Hypersensitivity p    Sexual dysfunction     Sleep apnea, obstructive     Tenosynovitis of finger     Tinea corporis     Tobacco use 2018    Currently smoking 3-4 cigarettes daily    Trigger middle finger of left hand 2017    Trigger ring finger of left hand 2017    Weakness of upper extremity        Past Surgical History:   Procedure Laterality Date    ABDOMINAL SURGERY      CARPAL TUNNEL RELEASE Bilateral      SECTION      CHOLECYSTECTOMY      laparoscopic    ESOPHAGOGASTRODUODENOSCOPY N/A 12/3/2018    Procedure: ESOPHAGOGASTRODUODENOSCOPY (EGD) AND COLONOSCOPY;  Surgeon: Ludmila Perry MD;  Location: [facility];  Service: Gastroenterology    GASTRIC BYPASS      for morbid obesity with gilda en y    HERNIA REPAIR      HYSTERECTOMY      SD EXCISION GANGLION WRIST DORSAL/VOLAR PRIMARY Left 2017    Procedure: LONG FINGER GANGLION CYST EXCISION;  Surgeon: Philippe Clark MD;  Location: [facility];  Service: Orthopedics    SD TENDON SHEATH INCISION Left 2017    Procedure: THUMB, LONG, RING, TRIGGER FINGER RELEASE;  Surgeon: Philippe Clark MD;  Location: [facility];  Service: Orthopedics    SHOULDER SURGERY Right        Family History   Problem Relation Age of Onset    Alzheimer's disease Mother     Atrial fibrillation Mother     Dementia Mother     Heart disease Mother         heart problem    Seizures Mother     Parkinsonism Father     Arthritis Father     Atrial fibrillation Father     No Known Problems Daughter     Cri-du-chat syndrome Daughter     Colon cancer Maternal Grandmother 80    Diabetes type II Maternal  Grandmother     No Known Problems Brother     No Known Problems Son     Substance Abuse Neg Hx         mother,father    Mental illness Neg Hx         mother,father    Colon polyps Neg Hx      I have reviewed and agree with the history as documented.    E-Cigarette/Vaping    E-Cigarette Use Never User      E-Cigarette/Vaping Substances    Nicotine No     THC No     CBD No     Flavoring No     Other No     Unknown No      Social History     Tobacco Use    Smoking status: Former     Current packs/day: 0.00     Average packs/day: 0.3 packs/day for 29.9 years (7.5 ttl pk-yrs)     Types: Cigarettes     Start date:      Quit date: 12/10/2019     Years since quittin.1    Smokeless tobacco: Never   Vaping Use    Vaping status: Never Used   Substance Use Topics    Alcohol use: Not Currently     Alcohol/week: 20.0 standard drinks of alcohol     Types: 20 Cans of beer per week     Comment: about 6 coors light every night, stopped in     Drug use: No       Review of Systems   Respiratory:  Positive for shortness of breath.        Physical Exam  Physical Exam  Constitutional:       General: She is in acute distress.      Appearance: She is obese.   Cardiovascular:      Rate and Rhythm: Regular rhythm. Tachycardia present.   Pulmonary:      Effort: Tachypnea, accessory muscle usage and respiratory distress present.      Breath sounds: Decreased breath sounds present.      Comments: Pursed lip breathing  Musculoskeletal:      Right lower leg: Edema present.      Left lower leg: Edema present.   Skin:     General: Skin is warm.   Neurological:      Mental Status: She is oriented to person, place, and time.         Vital Signs  ED Triage Vitals [24 1515]   Temperature Pulse Respirations Blood Pressure SpO2   98.2 °F (36.8 °C) 102 20 122/76 (!) 88 %      Temp Source Heart Rate Source Patient Position - Orthostatic VS BP Location FiO2 (%)   Temporal Monitor Sitting Left arm --      Pain Score       --            Vitals:    01/22/24 1515 01/22/24 1630   BP: 122/76 118/57   Pulse: 102 105   Patient Position - Orthostatic VS: Sitting          Visual Acuity      ED Medications  Medications   bumetanide (BUMEX) injection 4 mg (has no administration in time range)   docusate sodium (COLACE) capsule 100 mg (has no administration in time range)   methylPREDNISolone sodium succinate (Solu-MEDROL) injection 40 mg (has no administration in time range)   albuterol (PROVENTIL HFA,VENTOLIN HFA) inhaler 2 puff (has no administration in time range)   apixaban (ELIQUIS) tablet 5 mg (has no administration in time range)   atorvastatin (LIPITOR) tablet 40 mg (has no administration in time range)   budesonide (PULMICORT) inhalation solution 0.5 mg (has no administration in time range)   escitalopram (LEXAPRO) tablet 20 mg (has no administration in time range)   loratadine (CLARITIN) tablet 10 mg (has no administration in time range)   metoprolol succinate (TOPROL-XL) 24 hr tablet 100 mg (has no administration in time range)   montelukast (SINGULAIR) tablet 10 mg (has no administration in time range)   pantoprazole (PROTONIX) EC tablet 40 mg (has no administration in time range)   potassium chloride (K-DUR,KLOR-CON) CR tablet 40 mEq (has no administration in time range)   traZODone (DESYREL) tablet 150 mg (has no administration in time range)   insulin glargine (LANTUS) subcutaneous injection 30 Units 0.3 mL (has no administration in time range)   insulin lispro (HumaLOG) 100 units/mL subcutaneous injection 8 Units (has no administration in time range)   insulin lispro (HumaLOG) 100 units/mL subcutaneous injection 1-5 Units (has no administration in time range)   insulin lispro (HumaLOG) 100 units/mL subcutaneous injection 1-5 Units (has no administration in time range)   ipratropium-albuterol (DUO-NEB) 0.5-2.5 mg/3 mL inhalation solution 3 mL (3 mL Nebulization Given 1/22/24 1546)   furosemide (LASIX) injection 40 mg (40 mg Intravenous  Given 1/22/24 1631)   methylPREDNISolone sodium succinate (Solu-MEDROL) injection 80 mg (80 mg Intravenous Given 1/22/24 1633)   potassium chloride (K-DUR,KLOR-CON) CR tablet 40 mEq (40 mEq Oral Given 1/22/24 1704)       Diagnostic Studies  Results Reviewed       Procedure Component Value Units Date/Time    Magnesium [[id number]] Collected: 01/22/24 1534    Lab Status: In process Specimen: Blood from Arm, Right Updated: 01/22/24 1751    TSH, 3rd generation with Free T4 reflex [[id number]] Collected: 01/22/24 1534    Lab Status: In process Specimen: Blood from Arm, Right Updated: 01/22/24 1751    Fingerstick Glucose (POCT) [[id number]]  (Normal) Collected: 01/22/24 1717    Lab Status: Final result Updated: 01/22/24 1717     POC Glucose 76 mg/dl     Hemoglobin A1C [[id number]]     Lab Status: No result Specimen: Blood     T4, free [[id number]]     Lab Status: No result Specimen: Blood     UA (URINE) with reflex to Scope [[id number]]     Lab Status: No result Specimen: Urine     FLU/RSV/COVID - if FLU/RSV clinically relevant [[id number]]  (Normal) Collected: 01/22/24 1531    Lab Status: Final result Specimen: Nares from Nose Updated: 01/22/24 1620     SARS-CoV-2 Negative     INFLUENZA A PCR Negative     INFLUENZA B PCR Negative     RSV PCR Negative    Narrative:      FOR PEDIATRIC PATIENTS - copy/paste COVID Guidelines URL to browser: [email or web address]    SARS-CoV-2 assay is a Nucleic Acid Amplification assay intended for the  qualitative detection of nucleic acid from SARS-CoV-2 in nasopharyngeal  swabs. Results are for the presumptive identification of SARS-CoV-2 RNA.    Positive results are indicative of infection with SARS-CoV-2, the virus  causing COVID-19, but do not rule out bacterial infection or co-infection  with other viruses. Laboratories within the [place] and its  territories are required to report all positive results to the appropriate  public  health authorities. Negative results do not preclude SARS-CoV-2  infection and should not be used as the sole basis for treatment or other  patient management decisions. Negative results must be combined with  clinical observations, patient history, and epidemiological information.  This test has not been FDA cleared or approved.    This test has been authorized by FDA under an Emergency Use Authorization  (EUA). This test is only authorized for the duration of time the  declaration that circumstances exist justifying the authorization of the  emergency use of an in vitro diagnostic tests for detection of SARS-CoV-2  virus and/or diagnosis of COVID-19 infection under section 564(b)(1) of  the Act, 21 U.S.C. 360bbb-3(b)(1), unless the authorization is terminated  or revoked sooner. The test has been validated but independent review by FDA  and CLIA is pending.    Test performed using [employer] Gene[employer]pert: This RT-PCR assay targets N2,  a region unique to SARS-CoV-2. A conserved region in the E-gene was chosen  for pan-Sarbecovirus detection which includes SARS-CoV-2.    According to CMS-2020-01-R, this platform meets the definition of high-throughput technology.    HS Troponin I 2hr [[id number]]     Lab Status: No result Specimen: Blood     HS Troponin I 4hr [[id number]]     Lab Status: No result Specimen: Blood     HS Troponin 0hr (reflex protocol) [[id number]]  (Normal) Collected: 01/22/24 1534    Lab Status: Final result Specimen: Blood from Arm, Right Updated: 01/22/24 1607     hs TnI 0hr 6 ng/L     B-Type Natriuretic Peptide(BNP) [[id number]]  (Abnormal) Collected: 01/22/24 1534    Lab Status: Final result Specimen: Blood from Arm, Right Updated: 01/22/24 1607      pg/mL     Comprehensive metabolic panel [[id number]]  (Abnormal) Collected: 01/22/24 1534    Lab Status: Final result Specimen: Blood from Arm, Right Updated: 01/22/24 1603     Sodium 140 mmol/L      Potassium 3.3 mmol/L      Chloride 92 mmol/L       CO2 36 mmol/L      ANION GAP 12 mmol/L      BUN 17 mg/dL      Creatinine 1.32 mg/dL      Glucose 97 mg/dL      Calcium 8.3 mg/dL      AST 22 U/L      ALT 22 U/L      Alkaline Phosphatase 140 U/L      Total Protein 6.9 g/dL      Albumin 4.1 g/dL      Total Bilirubin 0.64 mg/dL      eGFR 42 ml/min/1.73sq m     Narrative:      National Kidney Disease Foundation guidelines for Chronic Kidney Disease (CKD):     Stage 1 with normal or high GFR (GFR > 90 mL/min/1.73 square meters)    Stage 2 Mild CKD (GFR = 60-89 mL/min/1.73 square meters)    Stage 3A Moderate CKD (GFR = 45-59 mL/min/1.73 square meters)    Stage 3B Moderate CKD (GFR = 30-44 mL/min/1.73 square meters)    Stage 4 Severe CKD (GFR = 15-29 mL/min/1.73 square meters)    Stage 5 End Stage CKD (GFR <15 mL/min/1.73 square meters)  Note: GFR calculation is accurate only with a steady state creatinine    CBC and differential [[id number]]  (Abnormal) Collected: 01/22/24 1534    Lab Status: Final result Specimen: Blood from Arm, Right Updated: 01/22/24 1545     WBC 12.75 Thousand/uL      RBC 5.04 Million/uL      Hemoglobin 10.0 g/dL      Hematocrit 35.1 %      MCV 70 fL      MCH 19.8 pg      MCHC 28.5 g/dL      RDW 18.3 %      MPV 10.6 fL      Platelets 320 Thousands/uL      nRBC 0 /100 WBCs      Neutrophils Relative 80 %      Immat GRANS % 1 %      Lymphocytes Relative 12 %      Monocytes Relative 7 %      Eosinophils Relative 0 %      Basophils Relative 0 %      Neutrophils Absolute 10.25 Thousands/µL      Immature Grans Absolute 0.07 Thousand/uL      Lymphocytes Absolute 1.52 Thousands/µL      Monocytes Absolute 0.87 Thousand/µL      Eosinophils Absolute 0.00 Thousand/µL      Basophils Absolute 0.04 Thousands/µL     POCT Blood Gas (CG8+) [[id number]]  (Abnormal) Collected: 01/22/24 1539    Lab Status: Final result Specimen: Venous Updated: 01/22/24 1543     ph, Brian ISTAT 7.410     pCO2, Brian i-STAT 58.7 mm HG      pO2, Brian i-STAT 42.0 mm HG      BE, i-STAT 10 mmol/L       HCO3, Brian i-STAT 37.2 mmol/L      CO2, i-STAT 39 mmol/L      O2 Sat, i-STAT 77 %      SODIUM, I-STAT 139 mmol/l      Potassium, i-STAT 3.3 mmol/L      Calcium, Ionized i-STAT 1.02 mmol/L      Hct, i-STAT 36 %      Hgb, i-STAT 12.2 g/dl      Glucose, i-STAT 97 mg/dl      Specimen Type VENOUS                   XR chest 1 view portable   ED Interpretation by Ivan Conklin DO (01/22 1630)   Cardiomegaly with pulmonary vascular congestion      Final Result by Francois Parker MD (01/22 1718)      Cardiomegaly and vascular congestion                  Workstation performed: [id number]                    Procedures  ECG 12 Lead Documentation Only    Date/Time: 1/22/2024 3:42 PM    Performed by: Ivan Conklin DO  Authorized by: Ivan Conklin DO    Indications / Diagnosis:  SOB  ECG reviewed by me, the ED Provider: yes    Patient location:  ED  Previous ECG:     Previous ECG:  Compared to current    Comparison ECG info:  A-fib has replaced normal sinus rhythm from October 30, 2023.  There are no new acute ischemic changes  Interpretation:     Interpretation: abnormal    Rate:     ECG rate:  103    ECG rate assessment: tachycardic    Rhythm:     Rhythm: atrial fibrillation    Ectopy:     Ectopy: none    ST segments:     ST segments:  Normal  T waves:     T waves: normal    CriticalCare Time    Date/Time: 1/22/2024 5:54 PM    Performed by: Ivan Conklin DO  Authorized by: Ivan Conklin DO    Critical care provider statement:     Critical care time (minutes):  36    Critical care time was exclusive of:  Separately billable procedures and treating other patients and teaching time    Critical care was necessary to treat or prevent imminent or life-threatening deterioration of the following conditions:  Respiratory failure and cardiac failure    Critical care was time spent personally by me on the following activities:  Blood draw for specimens, obtaining history from patient or surrogate,  development of treatment plan with patient or surrogate, discussions with consultants, evaluation of patient's response to treatment, examination of patient, ordering and performing treatments and interventions, ordering and review of laboratory studies, ordering and review of radiographic studies, re-evaluation of patient's condition, review of old charts and interpretation of cardiac output measurements    I assumed direction of critical care for this patient from another provider in my specialty: no             ED Course  ED Course as of 01/22/24 1755   Mon Jan 22, 2024   1623 Clinically improving upon reevaluation.  Patient still with increased work of breathing.   1629 SLIM messaged for admit   1630 I discussed the case with the hospitalist. We reviewed the HPI, pertinent PMH, ED course and workup. Hospitalist agreed with plan and will admit the patient to the hospital.                                 SBIRT 22yo+      Flowsheet Row Most Recent Value   Initial Alcohol Screen: US AUDIT-C     1. How often do you have a drink containing alcohol? 0 Filed at: 01/22/2024 1516   2. How many drinks containing alcohol do you have on a typical day you are drinking?  0 Filed at: 01/22/2024 1516   3a. Male UNDER 65: How often do you have five or more drinks on one occasion? 0 Filed at: 01/22/2024 1516   3b. FEMALE Any Age, or MALE 65+: How often do you have 4 or more drinks on one occassion? 0 Filed at: 01/22/2024 1516   Audit-C Score 0 Filed at: 01/22/2024 1516   JEIMY: How many times in the past year have you...    Used an illegal drug or used a prescription medication for non-medical reasons? Never Filed at: 01/22/2024 1516                      Medical Decision Making  Differential diagnosis: Acute on chronic COPD exacerbation, acute on chronic ingestive heart failure, anemia, electrolyte disturbance, pneumonia, COVID, flu, RSV, arrhythmia    Patient with significant work of breathing necessitating supplemental  respiratory support using mid flow and DuoNeb.  Patient with mixed picture of COPD and CHF.  Will treat with steroids as well as diuretics with plan for admission.    Amount and/or Complexity of Data Reviewed  External Data Reviewed: notes.     Details: Pulmonary note from earlier today reviewed regarding concern for mixed COPD/CHF exacerbation  Labs: ordered.  Radiology: ordered and independent interpretation performed.    Risk  Prescription drug management.  Decision regarding hospitalization.             Disposition  Final diagnoses:   COPD with acute exacerbation (HCC)   Acute on chronic diastolic heart failure (HCC)   Type 2 diabetes mellitus (HCC)   Atrial fibrillation (HCC)   Hypokalemia     Time reflects when diagnosis was documented in both MDM as applicable and the Disposition within this note       Time User Action Codes Description Comment    1/22/2024  4:26 PM Ivan Conklin [J44.1] COPD with acute exacerbation (HCC)     1/22/2024  4:26 PM Ivan Conklin Add [I50.33] Acute on chronic diastolic heart failure (HCC)     1/22/2024  4:27 PM Ivan Conklin Add [E11.9] Type 2 diabetes mellitus (HCC)     1/22/2024  4:28 PM Ivan Conklin Add [I48.91] Atrial fibrillation (HCC)     1/22/2024  4:35 PM Ivan Conklin Add [E87.6] Hypokalemia           ED Disposition       ED Disposition   Admit    Condition   Stable    Date/Time   Mon Jan 22, 2024 1632    Comment   Case was discussed with Dr. Cowan and the patient's admission status was agreed to be Admission Status: inpatient status to the service of Dr. Cowan .               Follow-up Information    None         Patient's Medications   Discharge Prescriptions    No medications on file       No discharge procedures on file.    PDMP Review         Value Time User    PDMP Reviewed  Yes 1/19/2024 10:27 AM Laith Olivares MD            ED Provider  Electronically Signed by             Ivan Conklin DO  01/22/24 [id number]

## 2024-01-22 NOTE — ED NOTES
Pt brought back to a room. Pt was asked to transfer from the wheelchair to the bed, pt stated she needed to use the bathroom and could not wait. Pt wheeled to restroom.     Jaleesa Catalan RN  01/22/24 4541

## 2024-01-23 ENCOUNTER — TELEPHONE (OUTPATIENT)
Age: 64
End: 2024-01-23

## 2024-01-23 LAB
ANION GAP SERPL CALCULATED.3IONS-SCNC: 9 MMOL/L
ATRIAL RATE: 326 BPM
ATRIAL RATE: 394 BPM
ATRIAL RATE: 96 BPM
BUN SERPL-MCNC: 17 MG/DL (ref 5–25)
CALCIUM SERPL-MCNC: 7.9 MG/DL (ref 8.4–10.2)
CHLORIDE SERPL-SCNC: 93 MMOL/L (ref 96–108)
CO2 SERPL-SCNC: 35 MMOL/L (ref 21–32)
CREAT SERPL-MCNC: 0.75 MG/DL (ref 0.6–1.3)
ERYTHROCYTE [DISTWIDTH] IN BLOOD BY AUTOMATED COUNT: 17.9 % (ref 11.6–15.1)
EST. AVERAGE GLUCOSE BLD GHB EST-MCNC: 197 MG/DL
GFR SERPL CREATININE-BSD FRML MDRD: 83 ML/MIN/1.73SQ M
GLUCOSE SERPL-MCNC: 297 MG/DL (ref 65–140)
GLUCOSE SERPL-MCNC: 303 MG/DL (ref 65–140)
GLUCOSE SERPL-MCNC: 308 MG/DL (ref 65–140)
GLUCOSE SERPL-MCNC: 328 MG/DL (ref 65–140)
GLUCOSE SERPL-MCNC: 398 MG/DL (ref 65–140)
HBA1C MFR BLD: 8.5 %
HCT VFR BLD AUTO: 31.9 % (ref 34.8–46.1)
HGB BLD-MCNC: 9.2 G/DL (ref 11.5–15.4)
MAGNESIUM SERPL-MCNC: 2.3 MG/DL (ref 1.9–2.7)
MCH RBC QN AUTO: 19.8 PG (ref 26.8–34.3)
MCHC RBC AUTO-ENTMCNC: 28.8 G/DL (ref 31.4–37.4)
MCV RBC AUTO: 69 FL (ref 82–98)
PLATELET # BLD AUTO: 270 THOUSANDS/UL (ref 149–390)
PMV BLD AUTO: 11.2 FL (ref 8.9–12.7)
POTASSIUM SERPL-SCNC: 3.8 MMOL/L (ref 3.5–5.3)
QRS AXIS: 71 DEGREES
QRS AXIS: 72 DEGREES
QRS AXIS: 72 DEGREES
QRSD INTERVAL: 66 MS
QRSD INTERVAL: 70 MS
QRSD INTERVAL: 70 MS
QT INTERVAL: 304 MS
QT INTERVAL: 338 MS
QT INTERVAL: 340 MS
QTC INTERVAL: 405 MS
QTC INTERVAL: 442 MS
QTC INTERVAL: 462 MS
RBC # BLD AUTO: 4.65 MILLION/UL (ref 3.81–5.12)
SODIUM SERPL-SCNC: 137 MMOL/L (ref 135–147)
T WAVE AXIS: 74 DEGREES
T WAVE AXIS: 77 DEGREES
T WAVE AXIS: 80 DEGREES
T4 FREE SERPL-MCNC: 1.12 NG/DL (ref 0.61–1.12)
VENTRICULAR RATE: 103 BPM
VENTRICULAR RATE: 107 BPM
VENTRICULAR RATE: 111 BPM
WBC # BLD AUTO: 8.28 THOUSAND/UL (ref 4.31–10.16)

## 2024-01-23 PROCEDURE — 93010 ELECTROCARDIOGRAM REPORT: CPT | Performed by: INTERNAL MEDICINE

## 2024-01-23 PROCEDURE — 99232 SBSQ HOSP IP/OBS MODERATE 35: CPT | Performed by: PHYSICIAN ASSISTANT

## 2024-01-23 PROCEDURE — 94760 N-INVAS EAR/PLS OXIMETRY 1: CPT

## 2024-01-23 PROCEDURE — 99223 1ST HOSP IP/OBS HIGH 75: CPT | Performed by: INTERNAL MEDICINE

## 2024-01-23 PROCEDURE — 94660 CPAP INITIATION&MGMT: CPT

## 2024-01-23 PROCEDURE — 82948 REAGENT STRIP/BLOOD GLUCOSE: CPT

## 2024-01-23 PROCEDURE — 85027 COMPLETE CBC AUTOMATED: CPT | Performed by: INTERNAL MEDICINE

## 2024-01-23 PROCEDURE — 83735 ASSAY OF MAGNESIUM: CPT | Performed by: INTERNAL MEDICINE

## 2024-01-23 PROCEDURE — 94640 AIRWAY INHALATION TREATMENT: CPT

## 2024-01-23 PROCEDURE — 80048 BASIC METABOLIC PNL TOTAL CA: CPT | Performed by: INTERNAL MEDICINE

## 2024-01-23 RX ORDER — METHYLPREDNISOLONE SODIUM SUCCINATE 40 MG/ML
40 INJECTION, POWDER, LYOPHILIZED, FOR SOLUTION INTRAMUSCULAR; INTRAVENOUS DAILY
Status: DISCONTINUED | OUTPATIENT
Start: 2024-01-24 | End: 2024-01-23

## 2024-01-23 RX ADMIN — BUMETANIDE 4 MG: 0.25 INJECTION INTRAMUSCULAR; INTRAVENOUS at 08:03

## 2024-01-23 RX ADMIN — INSULIN LISPRO 8 UNITS: 100 INJECTION, SOLUTION INTRAVENOUS; SUBCUTANEOUS at 17:13

## 2024-01-23 RX ADMIN — DOCUSATE SODIUM 100 MG: 100 CAPSULE, LIQUID FILLED ORAL at 08:06

## 2024-01-23 RX ADMIN — MONTELUKAST 10 MG: 10 TABLET, FILM COATED ORAL at 20:32

## 2024-01-23 RX ADMIN — INSULIN LISPRO 5 UNITS: 100 INJECTION, SOLUTION INTRAVENOUS; SUBCUTANEOUS at 17:18

## 2024-01-23 RX ADMIN — LORATADINE 10 MG: 10 TABLET ORAL at 08:07

## 2024-01-23 RX ADMIN — BUMETANIDE 4 MG: 0.25 INJECTION INTRAMUSCULAR; INTRAVENOUS at 17:11

## 2024-01-23 RX ADMIN — ALBUTEROL SULFATE 2 PUFF: 90 AEROSOL, METERED RESPIRATORY (INHALATION) at 15:20

## 2024-01-23 RX ADMIN — ATORVASTATIN CALCIUM 40 MG: 40 TABLET, FILM COATED ORAL at 17:11

## 2024-01-23 RX ADMIN — INSULIN GLARGINE 30 UNITS: 100 INJECTION, SOLUTION SUBCUTANEOUS at 20:32

## 2024-01-23 RX ADMIN — INSULIN LISPRO 8 UNITS: 100 INJECTION, SOLUTION INTRAVENOUS; SUBCUTANEOUS at 08:09

## 2024-01-23 RX ADMIN — APIXABAN 5 MG: 5 TABLET, FILM COATED ORAL at 08:06

## 2024-01-23 RX ADMIN — BUDESONIDE 0.5 MG: 0.5 INHALANT ORAL at 07:46

## 2024-01-23 RX ADMIN — DOCUSATE SODIUM 100 MG: 100 CAPSULE, LIQUID FILLED ORAL at 17:11

## 2024-01-23 RX ADMIN — BUDESONIDE 0.5 MG: 0.5 INHALANT ORAL at 20:47

## 2024-01-23 RX ADMIN — INSULIN LISPRO 3 UNITS: 100 INJECTION, SOLUTION INTRAVENOUS; SUBCUTANEOUS at 20:31

## 2024-01-23 RX ADMIN — METHYLPREDNISOLONE SODIUM SUCCINATE 40 MG: 40 INJECTION, POWDER, FOR SOLUTION INTRAMUSCULAR; INTRAVENOUS at 08:04

## 2024-01-23 RX ADMIN — ESCITALOPRAM OXALATE 20 MG: 20 TABLET ORAL at 08:07

## 2024-01-23 RX ADMIN — INSULIN LISPRO 3 UNITS: 100 INJECTION, SOLUTION INTRAVENOUS; SUBCUTANEOUS at 08:07

## 2024-01-23 RX ADMIN — INSULIN LISPRO 8 UNITS: 100 INJECTION, SOLUTION INTRAVENOUS; SUBCUTANEOUS at 12:02

## 2024-01-23 RX ADMIN — METOPROLOL SUCCINATE 100 MG: 50 TABLET, EXTENDED RELEASE ORAL at 08:05

## 2024-01-23 RX ADMIN — PANTOPRAZOLE SODIUM 40 MG: 40 TABLET, DELAYED RELEASE ORAL at 05:49

## 2024-01-23 RX ADMIN — APIXABAN 5 MG: 5 TABLET, FILM COATED ORAL at 17:11

## 2024-01-23 RX ADMIN — TRAZODONE HYDROCHLORIDE 150 MG: 100 TABLET ORAL at 20:32

## 2024-01-23 RX ADMIN — POTASSIUM CHLORIDE 40 MEQ: 1500 TABLET, EXTENDED RELEASE ORAL at 08:06

## 2024-01-23 RX ADMIN — INSULIN LISPRO 3 UNITS: 100 INJECTION, SOLUTION INTRAVENOUS; SUBCUTANEOUS at 12:08

## 2024-01-23 NOTE — PLAN OF CARE
Problem: DISCHARGE PLANNING  Goal: Discharge to home or other facility with appropriate resources  Description: INTERVENTIONS:  - Identify barriers to discharge w/patient and caregiver  - Arrange for needed discharge resources and transportation as appropriate  - Identify discharge learning needs (meds, wound care, etc.)  - Arrange for interpretive services to assist at discharge as needed  - Refer to Case Management Department for coordinating discharge planning if the patient needs post-hospital services based on physician/advanced practitioner order or complex needs related to functional status, cognitive ability, or social support system  Outcome: Progressing     Problem: Knowledge Deficit  Goal: Patient/family/caregiver demonstrates understanding of disease process, treatment plan, medications, and discharge instructions  Description: Complete learning assessment and assess knowledge base.  Interventions:  - Provide teaching at level of understanding  - Provide teaching via preferred learning methods  Outcome: Progressing     Problem: CARDIOVASCULAR - ADULT  Goal: Maintains optimal cardiac output and hemodynamic stability  Description: INTERVENTIONS:  - Monitor I/O, vital signs and rhythm  - Monitor for S/S and trends of decreased cardiac output  - Administer and titrate ordered vasoactive medications to optimize hemodynamic stability  - Assess quality of pulses, skin color and temperature  - Assess for signs of decreased coronary artery perfusion  - Instruct patient to report change in severity of symptoms  Outcome: Progressing  Goal: Absence of cardiac dysrhythmias or at baseline rhythm  Description: INTERVENTIONS:  - Continuous cardiac monitoring, vital signs, obtain 12 lead EKG if ordered  - Administer antiarrhythmic and heart rate control medications as ordered  - Monitor electrolytes and administer replacement therapy as ordered  Outcome: Progressing     Problem: RESPIRATORY - ADULT  Goal: Achieves  optimal ventilation and oxygenation  Description: INTERVENTIONS:  - Assess for changes in respiratory status  - Assess for changes in mentation and behavior  - Position to facilitate oxygenation and minimize respiratory effort  - Oxygen administered by appropriate delivery if ordered  - Initiate smoking cessation education as indicated  - Encourage broncho-pulmonary hygiene including cough, deep breathe, Incentive Spirometry  - Assess the need for suctioning and aspirate as needed  - Assess and instruct to report SOB or any respiratory difficulty  - Respiratory Therapy support as indicated  Outcome: Progressing     Problem: METABOLIC, FLUID AND ELECTROLYTES - ADULT  Goal: Electrolytes maintained within normal limits  Description: INTERVENTIONS:  - Monitor labs and assess patient for signs and symptoms of electrolyte imbalances  - Administer electrolyte replacement as ordered  - Monitor response to electrolyte replacements, including repeat lab results as appropriate  - Instruct patient on fluid and nutrition as appropriate  Outcome: Progressing  Goal: Fluid balance maintained  Description: INTERVENTIONS:  - Monitor labs   - Monitor I/O and WT  - Instruct patient on fluid and nutrition as appropriate  - Assess for signs & symptoms of volume excess or deficit  Outcome: Progressing  Goal: Glucose maintained within target range  Description: INTERVENTIONS:  - Monitor Blood Glucose as ordered  - Assess for signs and symptoms of hyperglycemia and hypoglycemia  - Administer ordered medications to maintain glucose within target range  - Assess nutritional intake and initiate nutrition service referral as needed  Outcome: Progressing

## 2024-01-23 NOTE — ASSESSMENT & PLAN NOTE
Recent Labs     01/22/24  1534 01/23/24  0504   CREATININE 1.32* 0.75   EGFR 42 83       Estimated Creatinine Clearance: 99.4 mL/min (by C-G formula based on SCr of 0.75 mg/dL).     Baseline creatinine-0.5-0.7  Etiology-prerenal azotemia in setting of volume overload/acute diastolic heart failure  Continue with IV Bumex  Urinary retention protocol  Avoid nephrotoxic agents  Monitor BMP daily

## 2024-01-23 NOTE — CASE MANAGEMENT
Case Management Assessment & Discharge Planning Note    Patient name Mattie Joy  Location /-01 MRN [id number]  : 1960 Date 2024       Current Admission Date: 2024  Current Admission Diagnosis:Acute on chronic hypercapnic respiratory failure (HCC)   Patient Active Problem List    Diagnosis Date Noted    IVELISSE (acute kidney injury) (HCC) 2024    Eosinophilic asthma 2023    COPD, severe with acute exacerbation 2023    Hepatic steatosis 2023    Morbid obesity (HCC) 02/15/2023    Diabetic polyneuropathy associated with type 2 diabetes mellitus (HCC) 11/15/2021    Type 2 diabetes mellitus with hyperglycemia (HCC) 2021    Tubular adenoma of colon 2021    Transaminitis 2021    Gastric polyp 2021    Class 3 severe obesity in adult (HCC) 2021    Pulmonary hypertension (HCC) 2020    Acute on chronic hypercapnic respiratory failure (HCC) 2020    Insomnia 10/28/2020    Abnormal CT of the chest 2020    Lactic acidosis 2020    Acute on chronic heart failure with preserved ejection fraction (HCC) 2020    Microcytic anemia 2020    Neck pain, chronic 2019    Current moderate episode of major depressive disorder without prior episode (HCC) 2019    Paroxysmal atrial fibrillation (HCC) 2018    Severe persistent asthma 2018    Abnormal computed tomography angiography (CTA) of abdomen 2018    Abnormal CT of the abdomen 2018    Iron deficiency anemia due to chronic blood loss 2018    Type 2 diabetes mellitus with stage 2 chronic kidney disease, with long-term current use of insulin (HCC)     Ganglion cyst of flexor tendon sheath 2017    Paresthesia of upper extremity 2017    Cervical radiculopathy 2017    Elevated liver enzymes 2015    Sexual dysfunction 2014    Chronic low back pain 10/15/2014    Hypercholesterolemia 2014    Lumbar  radiculopathy 09/09/2014    Esophageal reflux 06/23/2014    Hypertensive heart and chronic kidney disease with heart failure and stage 1 through stage 4 chronic kidney disease, or chronic kidney disease (HCC) 03/24/2014    Obstructive sleep apnea 03/09/2014      LOS (days): 1  Geometric Mean LOS (GMLOS) (days): 3.9  Days to GMLOS:2.9     OBJECTIVE:    Risk of Unplanned Readmission Score: 41.47         Current admission status: Inpatient       Preferred Pharmacy[place]   [facility] [place] [address]  [address] [address]  Phone: [phone] Fax: [phone]    [facility] [place] - [facility]  [address]  [address]  [facility] [address]  Phone: [phone] Fax: [phone]    Primary Care Provider: Laith Olivares MD    Primary Insurance: MEDICARE  Secondary Insurance: AETNA    ASSESSMENT:  Active Health Care Proxies       PADMA SAUCEDO [facility] Care Representative - Daughter   Primary Phone: [phone] (Mobile)                 Readmission Root Cause  30 Day Readmission: No    Patient Information  Admitted from:: Home  Mental Status: Alert  During Assessment patient was accompanied by: Not accompanied during assessment  Assessment information provided by:: Patient  Primary Caregiver: Self  Support Systems: Friend, Self, Private Caregivers  County of Residence: [place]  What city do you live in?: [place]  Home entry access options. Select all that apply.: Stairs  Number of steps to enter home.: 2  Do the steps have railings?: No  Type of Current Residence: [facility]  Living Arrangements: Lives Alone  Is patient a ?: No    Activities of Daily Living Prior to Admission  Functional Status: Independent  Completes ADLs independently?: Yes  Ambulates independently?: Yes  Does patient use assisted devices?: Yes  Assisted Devices (DME) used: Walker, BiPAP, CPAP, Home Oxygen concentrator, Portable Oxygen  tanks, Nebulizer  Does patient currently own DME?: Yes  What DME does the patient currently own?: BiPAP, CPAP, Walker, Home Oxygen concentrator, Nebulizer, Portable Oxygen tanks  Does patient have a history of Outpatient Therapy (PT/OT)?: No  Does the patient have a history of Short-Term Rehab?: No  Does patient have a history of HHC?: Yes ([facility])  Does patient currently have HHC?: No         Patient Information Continued  Income Source: Pension/custodial  Does patient have prescription coverage?: Yes  Does patient receive dialysis treatments?: No  Does patient have a history of substance abuse?: No  Does patient have a history of Mental Health Diagnosis?: No    Means of Transportation  Means of Transport to Appts:: Friends      Housing Stability: Low Risk  (1/23/2024)    Housing Stability Vital Sign     Unable to Pay for Housing in the Last Year: No     Number of Places Lived in the Last Year: 1     Unstable Housing in the Last Year: No   Food Insecurity: No Food Insecurity (1/23/2024)    Hunger Vital Sign     Worried About Running Out of Food in the Last Year: Never true     Ran Out of Food in the Last Year: Never true   Transportation Needs: No Transportation Needs (1/23/2024)    PRAPARE - Transportation     Lack of Transportation (Medical): No     Lack of Transportation (Non-Medical): No   Utilities: Not At Risk (1/23/2024)    [facility] Utilities     Threatened with loss of utilities: No       DISCHARGE DETAILS:    Additional Comments: Met with Pt. Pt presents AA&Ox3. Discussed role of case management. Pt lives alone in 1sh, 2 paxtno, no railing. Owns walker, uses bipap, cpap, nebulizer, oxygen, wheelchair. Has private HHA that comes in once a week. Hx of [facility]. Friend assists with transportation(Melinda). Pt's friend(Viktor) does grocery shopping. Pt reports she does not have POA or living will and agreeable for information.Denies hx of SNF/MH/D&A.  Pt requesting [facility] SN upon discharge. Pt requesting  information for meals at home. Information for [employer] given to Pt. Referral sent [facility] via [employer]. Pt's friend(Viktor) to transport home. CM to follow.

## 2024-01-23 NOTE — ASSESSMENT & PLAN NOTE
Counseling about adaptation of healthy dietary habits and lifestyle modifications, once medically stable  BMI >50  Affects all aspects of care

## 2024-01-23 NOTE — OCCUPATIONAL THERAPY NOTE
Occupational Therapy Screen        Patient Name: Mattie Joy  Today's Date: 1/23/2024 01/23/24 4794   Note Type   Note type Screen   Additional Comments Spoke w RN Cristin, pt is independent in the room and has been completing ADLs in the room with no assistance. At this time, pt does not required skilled IP OT services. Will D/C pt from caseload, if new needs arise, please consult.     Becca Fan, OTR/L

## 2024-01-23 NOTE — ASSESSMENT & PLAN NOTE
Home regimen consists of benralizumab, Singulair, Pulmicort nebulization twice daily, Xopenex and albuterol as needed, Bevespi twice daily  Was seen at pulmonology office on 1/22, had respiratory distress, was given prednisone taper and urged to go to ED for evaluation  Currently on 5L NC  Baseline 4L NC  Status post IV Solu-Medrol 125 mg in ED  IV Solu-Medrol 40mg discontinued  Respiratory protocol  Continue with Pulmicort nebulization twice daily  Albuterol and Xopenex as needed  Singulair 10 mg daily  Inpatient consult to Pulmonology

## 2024-01-23 NOTE — CONSULTS
Consultation - Pulmonary Medicine   Mattie Joy 63 y.o. female MRN: [id number]  Unit/Bed#: -01 Encounter: [id number]      Assessment & Plan:   Acute on chronic respiratory failure  Acute CHF  Asthma/COPD overlap  WILMA on CPAP at home  Pulmonary hypertension  Morbid obesity    Patient required min follow-up on admission.  At baseline uses 4 L.  Maintain pulse ox greater than 88%.  Acute hypoxia secondary to CHF.  Vascular congestion seen on chest x-ray.  No wheezing appreciated on my exam today.  Will discontinue IV steroids.  Her home regimen is Pulmicort via nebulizer twice daily, Bevespi, Singulair, Claritin, albuterol inhaler/albuterol nebulizer treatments as needed  Needs outpatient BiPAP study with transcutaneous monitoring  Please call pulmonary if any additional questions prior to discharge      History of Present Illness   Physician Requesting Consult: Hawa Marsh MD  Reason for Consult / Principal Problem: COPD  Hx and PE limited by: none  HPI: Mattie Joy is a 63 y.o. year old female who presents with shortness of breath.  Patient went to our pulmonary office yesterday to be evaluated for respiratory distress.  Patient went straight to the ER afterwards.  She has received some IV diuretics and is already feeling little bit better.  She still has some conversational dyspnea.  She is well-known to our pulmonary group.    Inpatient consult to Pulmonology  Consult performed by: Suyapa España PA-C  Consult ordered by: Hawa Marsh MD          Review of Systems   Constitutional:  Negative for chills and fever.   Respiratory:  Positive for shortness of breath.    Cardiovascular:  Positive for leg swelling.   All other systems reviewed and are negative.      Historical Information   Past Medical History:   Diagnosis Date    Acute blood loss anemia 06/01/2021    Acute diverticulitis 05/02/2021    Acute on chronic diastolic congestive heart failure (HCC) 05/21/2020    Acute on chronic respiratory  failure with hypoxia (HCC) 2018    Alcohol abuse 2020    Anemia     Asthma with COPD with exacerbation  2020    Atrial fibrillation (HCC)     Cervical radiculopathy     CHF (congestive heart failure) ([facility])     Chronic low back pain     Chronic obstructive asthma 2018    Cigarette nicotine dependence without complication 2019    Quit 12/10/2019.     Community acquired pneumonia 2020    COPD exacerbation ([facility]) 2020    Depression with anxiety 2014    Diabetes mellitus with hyperglycemia ([facility])     Diverticulitis 2021    Elevated liver enzymes     GERD (gastroesophageal reflux disease)     Hypersomnolence 10/28/2020    Hypokalemia 2018    Lower gastrointestinal bleeding 2021    Paresthesia of upper extremity     Plantar fasciitis     Restless legs syndrome 2014    Severe persistent asthma with exacerbation 2018    PFTs 2018:  FEV1 0.87 L-35% predicted, 27% improvement post-bronchodilator.  DLCO-82% predicted,  The patient has been having worsening of her symptoms now for few months, especially  from 2020, also she had multiple exacerbations last year and most recently required a steroid burst and August  Reviewing her CT scan  New PFTs with severe obstruction reviewed  Hypersensitivity p    Sexual dysfunction     Sleep apnea, obstructive     Tenosynovitis of finger     Tinea corporis     Tobacco use 2018    Currently smoking 3-4 cigarettes daily    Trigger middle finger of left hand 2017    Trigger ring finger of left hand 2017    Weakness of upper extremity      Past Surgical History:   Procedure Laterality Date    ABDOMINAL SURGERY      CARPAL TUNNEL RELEASE Bilateral      SECTION      CHOLECYSTECTOMY      laparoscopic    ESOPHAGOGASTRODUODENOSCOPY N/A 12/3/2018    Procedure: ESOPHAGOGASTRODUODENOSCOPY (EGD) AND COLONOSCOPY;  Surgeon: Ludmila Perry MD;  Location:  MAIN OR;  Service:  Gastroenterology    GASTRIC BYPASS      for morbid obesity with gilda en y    HERNIA REPAIR      HYSTERECTOMY      HI EXCISION GANGLION WRIST DORSAL/VOLAR PRIMARY Left 2017    Procedure: LONG FINGER GANGLION CYST EXCISION;  Surgeon: Philippe Clark MD;  Location: [facility];  Service: Orthopedics    HI TENDON SHEATH INCISION Left 2017    Procedure: THUMB, LONG, RING, TRIGGER FINGER RELEASE;  Surgeon: Philippe Clark MD;  Location: [facility];  Service: Orthopedics    SHOULDER SURGERY Right      Social History   Social History     Substance and Sexual Activity   Alcohol Use Not Currently    Alcohol/week: 20.0 standard drinks of alcohol    Types: 20 Cans of beer per week    Comment: about 6 coors light every night, stopped in 2019     Social History     Substance and Sexual Activity   Drug Use No     E-Cigarette/Vaping    E-Cigarette Use Never User      E-Cigarette/Vaping Substances    Nicotine No     THC No     CBD No     Flavoring No     Other No     Unknown No      Social History     Tobacco Use   Smoking Status Former    Current packs/day: 0.00    Average packs/day: 0.3 packs/day for 29.9 years (7.5 ttl pk-yrs)    Types: Cigarettes    Start date:     Quit date: 12/10/2019    Years since quittin.1   Smokeless Tobacco Never       Family History:   Family History   Problem Relation Age of Onset    Alzheimer's disease Mother     Atrial fibrillation Mother     Dementia Mother     Heart disease Mother         heart problem    Seizures Mother     Parkinsonism Father     Arthritis Father     Atrial fibrillation Father     No Known Problems Daughter     Cri-du-chat syndrome Daughter     Colon cancer Maternal Grandmother 80    Diabetes type II Maternal Grandmother     No Known Problems Brother     No Known Problems Son     Substance Abuse Neg Hx         mother,father    Mental illness Neg Hx         mother,father    Colon polyps Neg Hx        Meds/Allergies   all current active meds have been  "reviewed    Allergies   Allergen Reactions    Iron Dextran Swelling    Januvia [Sitagliptin] Shortness Of Breath    Jardiance [Empagliflozin] Shortness Of Breath    Clonazepam Other (See Comments)     Unknown reaction    Codeine Itching and Other (See Comments)     itch;mary kay morphine,takes ultram @home    Adhesive [Medical Tape] Itching     itching    Effexor [Venlafaxine] Itching    Lactose - Food Allergy GI Intolerance    Oxycodone-Acetaminophen Anxiety    Oxycodone-Acetaminophen Itching and Rash     Other reaction(s): Other (See Comments)  (PERCOCET) MILD RASH, not allergic to Acetaminophen        Objective   Vitals: Blood pressure 122/79, pulse 103, temperature 98.5 °F (36.9 °C), resp. rate 18, height 5' 3\" (1.6 m), weight 132 kg (291 lb 14.2 oz), SpO2 92%, not currently breastfeeding.,Body mass index is 51.71 kg/m².    Intake/Output Summary (Last 24 hours) at 1/23/2024 1038  Last data filed at 1/23/2024 0854  Gross per 24 hour   Intake 876 ml   Output 2175 ml   Net -1299 ml     Invasive Devices       Peripheral Intravenous Line  Duration             Peripheral IV 01/22/24 Proximal;Right;Ventral (anterior) Forearm <1 day                    Physical Exam  Vitals reviewed.   Constitutional:       General: She is not in acute distress.     Appearance: She is obese. She is not toxic-appearing.   HENT:      Head: Normocephalic and atraumatic.   Eyes:      General: No scleral icterus.  Cardiovascular:      Rate and Rhythm: Normal rate and regular rhythm.   Pulmonary:      Effort: Pulmonary effort is normal.      Comments: Decreased breath sounds  Musculoskeletal:         General: No signs of injury.      Right lower leg: Edema present.      Left lower leg: Edema present.   Skin:     General: Skin is warm and dry.   Neurological:      General: No focal deficit present.      Mental Status: She is alert. Mental status is at baseline.   Psychiatric:         Mood and Affect: Mood normal.         Behavior: Behavior normal. "         Lab Results: I have personally reviewed pertinent lab results.  Imaging Studies: I have personally reviewed pertinent reports.    EKG, Pathology, and Other Studies: I have personally reviewed pertinent reports.    VTE Prophylaxis: Eliquis    Code Status: Level 1 - Full Code  Advance Directive and Living Will:      Power of :    POLST:

## 2024-01-23 NOTE — ASSESSMENT & PLAN NOTE
Wt Readings from Last 3 Encounters:   01/23/24 132 kg (291 lb 14.2 oz)   11/09/23 129 kg (284 lb 12.8 oz)   09/20/23 128 kg (283 lb)     Appears volume overloaded  Worsening shortness of breath with bilateral lower extremity edema  Chest x-ray-pulmonary venous congestion  BNP-174, in obese patient  Echo 08/23-preserved EF, diastolic dysfunction, right ventricular systolic pressure 61  Home regimen-Bumex 2 mg twice daily  Continue with IV Bumex 4 mg twice daily  Fluid restriction 1500 mL  Salt restriction  Strict I's and O's  Appreciate Cardiology input

## 2024-01-23 NOTE — CONSULTS
Consult - Cardiology   Mattie Joy 63 y.o. female MRN: [id number]  Unit/Bed#: -01 Encounter: [id number]        Reason For Consult: Heart failure  Outpatient Cardiologist: Dr. Pacheco               ASSESSMENT:  Acute on chronic respiratory failure  Baseline oxygen requirements 4 L nasal cannula  Initially on admission patient noted to be hypoxic with saturations in the upper 80s while on 5 L nasal cannula, she has at maximum this admission thus far required 9-10 L but was able to wean down as of 1/23/2024  Etiology multifactorial in light of congestive heart failure as well as COPD exacerbation  Acute on chronic diastolic heart failure  8/15/2023 echo: LVEF 65%, grade 2 diastolic dysfunction, severely elevated RV systolic pressure 61 mmHg  Prehospital diuretic: Bumex 4 twice daily with 40 potassium daily  Prior weight as low as 278 in August 2023 at which time she was reportedly euvolemic  Paroxysmal atrial fibrillation  Patient in atrial fibrillation on arrival with rates in the low 100s  Thromboembolic PPx: Eliquis 5 twice daily  Rate control: Toprol- daily  COPD with acute exacerbation  Type 2 diabetes  Obesity, BMI 51  Pulmonary hypertension likely group 2/3 from left heart disease, underlying lung disease, OHV  Acute kidney injury  Suspect cardiorenal  Creatinine 1.32 on arrival which has improved with IV diuretics    -Patient with multifactorial respiratory failure, likely has an element of volume overload and an acute exacerbation of CHF  -Suspect there could be an element of sodium indiscretion at home leading to volume overload. Patient is essentially unable to leave her house and unable to cook for herself due to severe shortness of breath so does need to resort to some processed foods  -We will plan for aggressive diuresis following renal function, electrolytes, I/O  -Patient in atrial fibrillation but rates fairly well-controlled, for now we will continue rate control and anticoagulation  strategy while her respiratory status is optimized and a rhythm control strategy can be considered in the future  -Need to try and obtain standing weight if possible  -Initial creatinine noted to be elevated at 1.32 which has improved with diuretics    PLAN/ DISCUSSION:     Continue IV Bumex 4 twice daily  Continue 40 daily potassium  Continue Eliquis 5 twice daily  Continue Toprol- daily   Try to obtain standing weight  Trend daily BMP, I/O  Follow rates of atrial fibrillation and if she remains tachycardic despite medical optimization we can adjust her doses of beta-blocker    History Of Present Illness:  This is a 63-year-old female with known history of diastolic congestive heart failure.  We have seen her for prior admissions last year for the same.  She has not followed up in our office since August 2023 but she has been following with her primary care doctor, endocrinologist, and pulmonology.  She follows with the pulmonology group for underlying COPD and asthma.  In the past she has had issues with high sodium in her diet which have led to exacerbations of congestive heart failure.  It seems she has been fairly stable recently without any hospital admission since September 2023.    Currently she is admitted to the hospital for shortness of breath.  This is a chronic issue.  She is short of breath at baseline to the point where she cannot leave her house and cannot do much inside of the house.  She has a friend who grocery shops for her.  She tells me that she is unable to cook very much as any level of exertion leaves her winded.  Over the last month her shortness of breath has been worse than baseline.  Is getting progressively worse.  Specifically over the last week it seems to have acutely worsened.  She went to see her pulmonologist yesterday who sent her to the emergency department for evaluation.    She reports that her prior baseline weight may have been in the mid 280s and currently she is in  the low to 190s.  She is not weighing herself on a regular basis at home.    Past Medical History:        Past Medical History:   Diagnosis Date    Acute blood loss anemia 06/01/2021    Acute diverticulitis 05/02/2021    Acute on chronic diastolic congestive heart failure (HCC) 05/21/2020    Acute on chronic respiratory failure with hypoxia (HCC) 11/30/2018    Alcohol abuse 05/21/2020    Anemia     Asthma with COPD with exacerbation  11/12/2020    Atrial fibrillation ([facility])     Cervical radiculopathy     CHF (congestive heart failure) ([facility])     Chronic low back pain     Chronic obstructive asthma 02/20/2018    Cigarette nicotine dependence without complication 11/20/2019    Quit 12/10/2019.     Community acquired pneumonia 05/21/2020    COPD exacerbation ([facility]) 09/16/2020    Depression with anxiety 03/09/2014    Diabetes mellitus with hyperglycemia ([facility])     Diverticulitis 06/06/2021    Elevated liver enzymes     GERD (gastroesophageal reflux disease)     Hypersomnolence 10/28/2020    Hypokalemia 12/04/2018    Lower gastrointestinal bleeding 06/01/2021    Paresthesia of upper extremity     Plantar fasciitis     Restless legs syndrome 04/08/2014    Severe persistent asthma with exacerbation 02/20/2018    PFTs February 8, 2018:  FEV1 0.87 L-35% predicted, 27% improvement post-bronchodilator.  DLCO-82% predicted,  The patient has been having worsening of her symptoms now for few months, especially  from January 2020, also she had multiple exacerbations last year and most recently required a steroid burst and August  Reviewing her CT scan  New PFTs with severe obstruction reviewed  Hypersensitivity p    Sexual dysfunction     Sleep apnea, obstructive     Tenosynovitis of finger     Tinea corporis     Tobacco use 02/20/2018    Currently smoking 3-4 cigarettes daily    Trigger middle finger of left hand 12/14/2017    Trigger ring finger of left hand 12/14/2017    Weakness of upper extremity       Past Surgical History:    Procedure Laterality Date    ABDOMINAL SURGERY      CARPAL TUNNEL RELEASE Bilateral      SECTION      CHOLECYSTECTOMY      laparoscopic    ESOPHAGOGASTRODUODENOSCOPY N/A 12/3/2018    Procedure: ESOPHAGOGASTRODUODENOSCOPY (EGD) AND COLONOSCOPY;  Surgeon: Ludmila Perry MD;  Location: [facility];  Service: Gastroenterology    GASTRIC BYPASS      for morbid obesity with gilda en y    HERNIA REPAIR      HYSTERECTOMY      NH EXCISION GANGLION WRIST DORSAL/VOLAR PRIMARY Left 2017    Procedure: LONG FINGER GANGLION CYST EXCISION;  Surgeon: Philippe Clark MD;  Location: [facility];  Service: Orthopedics    NH TENDON SHEATH INCISION Left 2017    Procedure: THUMB, LONG, RING, TRIGGER FINGER RELEASE;  Surgeon: Philippe Clark MD;  Location: [facility];  Service: Orthopedics    SHOULDER SURGERY Right         Allergy:        Allergies   Allergen Reactions    Iron Dextran Swelling    Januvia [Sitagliptin] Shortness Of Breath    Jardiance [Empagliflozin] Shortness Of Breath    Clonazepam Other (See Comments)     Unknown reaction    Codeine Itching and Other (See Comments)     itch;mary kay morphine,takes ultram @home    Adhesive [Medical Tape] Itching     itching    Effexor [Venlafaxine] Itching    Lactose - Food Allergy GI Intolerance    Oxycodone-Acetaminophen Anxiety    Oxycodone-Acetaminophen Itching and Rash     Other reaction(s): Other (See Comments)  (PERCOCET) MILD RASH, not allergic to Acetaminophen        Medications:       Prior to Admission medications    Medication Sig Start Date End Date Taking? Authorizing Provider   apixaban (Eliquis) 5 mg Take 1 tablet (5 mg total) by mouth 2 (two) times a day 23  Yes Jovanni Pacheco MD   atorvastatin (LIPITOR) 40 mg tablet Take 1 tablet (40 mg total) by mouth daily 23  Yes Jovanni Pacheco MD   Blood Glucose Monitoring Suppl (Contour Next One) AILYN USE AS DIRECTED 3 TIMES A DAY 10/30/23  Yes Laith Olivares MD   bumetanide (BUMEX) 2  mg tablet TAKE 2 TABLETS BY MOUTH 2 TIMES A DAY. 11/9/23  Yes Jovanni Pacheco MD   Contour Next Test test strip USE TO TEST BLOOD SUGAR 3 TIMES A DAY 10/29/23  Yes Laith Olivares MD   [employer] Lancets Thin 26G MISC USE 3 (THREE) TIMES A DAY 5/4/22  Yes Laith Olivares MD   escitalopram (LEXAPRO) 20 mg tablet TAKE 1 TABLET BY MOUTH EVERY DAY 12/4/23  Yes Laith Olivares MD   Insulin Glargine Solostar (Lantus SoloStar) 100 UNIT/ML SOPN Inject 0.3 mL (30 Units total) as directed daily at bedtime 12/6/23  Yes Lobito Alfredo PA-C   insulin glulisine (Apidra SoloStar) 100 units/mL injection pen 30 UNITS BREAKFAST 15 UNITS AT LUNCH AND DINNER 12/6/23  Yes Lobito Alfredo PA-C   Insulin Pen Needle (BD Pen Needle Love 2nd Gen) 32G X 4 MM MISC Use daily at bedtime Use 4 new needles daily . 12/6/23  Yes Lobito Aflredo PA-C   metFORMIN (GLUCOPHAGE-XR) 500 mg 24 hr tablet TAKE 2 TABLETS BY MOUTH TWICE A DAY WITH FOOD 7/27/23  Yes Laith Olivares MD   metoprolol succinate (TOPROL-XL) 100 mg 24 hr tablet Take 1 tablet (100 mg total) by mouth daily 3/29/23  Yes Jovanni Pacheco MD   montelukast (SINGULAIR) 10 mg tablet TAKE 1 TABLET BY MOUTH EVERYDAY AT BEDTIME 11/11/23  Yes Laith Olivares MD   omeprazole (PriLOSEC) 40 MG capsule TAKE 1 CAPSULE (40 MG TOTAL) BY MOUTH DAILY. 6/25/23  Yes Laith Olivares MD   potassium chloride (K-DUR,KLOR-CON) 20 mEq tablet Take 2 tablets (40 mEq total) by mouth daily 12/6/23  Yes Jovanni Pacheco MD   traZODone (DESYREL) 150 mg tablet TAKE 1 TABLET BY MOUTH EVERYDAY AT BEDTIME 10/24/23  Yes Laith Olivares MD   albuterol (2.5 mg/3 mL) 0.083 % nebulizer solution Take 3 mL (2.5 mg total) by nebulization every 6 (six) hours as needed for wheezing or shortness of breath 6/8/23   Tin Brennan MD   albuterol (PROVENTIL HFA,VENTOLIN HFA) 90 mcg/act inhaler Inhale 2 puffs every 4 (four) hours as needed for wheezing 6/8/23   Tin Brennan MD    Benralizumab 30 MG/ML SOAJ Inject 1 mL under the skin every 28 days 6/8/23   Tin Brennan MD   budesonide (PULMICORT) 0.5 mg/2 mL nebulizer solution TAKE 2 ML (0.5 MG TOTAL) BY NEBULIZATION TWICE A DAY RINSE MOUTH AFTER USE 10/30/23   Tin Brennan MD   EPINEPHrine (EPIPEN) 0.3 mg/0.3 mL SOAJ Inject 0.3 mL (0.3 mg total) into a muscle once for 1 dose 10/19/23 11/9/23  VICKY Crow   ferrous sulfate 220 (44 Fe) mg/5 mL solution TAKE 5 ML (220 MG TOTAL) BY MOUTH 2 (TWO) TIMES A DAY BEFORE MEALS 2/1/22 11/9/23  Jaime Doshi MD   fluticasone (FLONASE) 50 mcg/act nasal spray 1 spray into each nostril 2 (two) times a day 9/23/20   Tin Brennan MD   glycopyrrolate-formoterol (BEVESPI AEROSPHERE) 9-4.8 MCG/ACT inhaler Inhale 2 puffs 2 (two) times a day 6/8/23   Tni Brennan MD   levalbuterol (XOPENEX) 1.25 mg/0.5 mL nebulizer solution Take 0.5 mL (1.25 mg total) by nebulization 2 (two) times a day 11/16/20   Irma Friedman DO   loratadine (CLARITIN) 10 mg tablet Take by mouth    Topher Barlow MD   LORazepam (ATIVAN) 0.5 mg tablet TAKE 2 TABLETS BY MOUTH AT BEDTIME AND 1 EVERY 6 HOURS AS NEEDED FOR ANXIETY 1/19/24   Laith Olivares MD   naloxone (NARCAN) 4 mg/0.1 mL nasal spray Administer 1 spray into a nostril. If breathing does not return to normal or if breathing difficulty resumes after 2-3 minutes, give another dose in the other nostril using a new spray. 7/29/20   Laith Olivares MD   predniSONE 10 mg tablet Take 4 tablets (40 mg total) by mouth daily for 3 days, THEN 3 tablets (30 mg total) daily for 3 days, THEN 2 tablets (20 mg total) daily for 3 days, THEN 1 tablet (10 mg total) daily for 3 days. 1/22/24 2/2/24  VICKY Dhaliwal   semaglutide, 0.25 or 0.5 mg/dose, (Ozempic, 0.25 or 0.5 MG/DOSE,) 2 mg/3 mL injection pen Inject 0.375 mL (0.25 mg total) under the skin every 7 days Increase to 0.5 mg after 4 weeks. 12/6/23   Lobito Alfredo PA-C       Family History:     Family  History   Problem Relation Age of Onset    Alzheimer's disease Mother     Atrial fibrillation Mother     Dementia Mother     Heart disease Mother         heart problem    Seizures Mother     Parkinsonism Father     Arthritis Father     Atrial fibrillation Father     No Known Problems Daughter     Cri-du-chat syndrome Daughter     Colon cancer Maternal Grandmother 80    Diabetes type II Maternal Grandmother     No Known Problems Brother     No Known Problems Son     Substance Abuse Neg Hx         mother,father    Mental illness Neg Hx         mother,father    Colon polyps Neg Hx         Social History:       Social History     Socioeconomic History    Marital status: Significant Other     Spouse name: None    Number of children: None    Years of education: None    Highest education level: None   Occupational History    None   Tobacco Use    Smoking status: Former     Current packs/day: 0.00     Average packs/day: 0.3 packs/day for 29.9 years (7.5 ttl pk-yrs)     Types: Cigarettes     Start date:      Quit date: 12/10/2019     Years since quittin.1    Smokeless tobacco: Never   Vaping Use    Vaping status: Never Used   Substance and Sexual Activity    Alcohol use: Not Currently     Alcohol/week: 20.0 standard drinks of alcohol     Types: 20 Cans of beer per week     Comment: about 6 coors light every night, stopped in     Drug use: No    Sexual activity: Not Currently   Other Topics Concern    None   Social History Narrative    None     Social Determinants of Health     Financial Resource Strain: Not on file   Food Insecurity: No Food Insecurity (2023)    Hunger Vital Sign     Worried About Running Out of Food in the Last Year: Never true     Ran Out of Food in the Last Year: Never true   Transportation Needs: No Transportation Needs (2023)    PRAPARE - Transportation     Lack of Transportation (Medical): No     Lack of Transportation (Non-Medical): No   Physical Activity: Not on file   Stress: Not  on file   Social Connections: Not on file   Intimate Partner Violence: Not on file   Housing Stability: Low Risk  (9/6/2023)    Housing Stability Vital Sign     Unable to Pay for Housing in the Last Year: No     Number of Places Lived in the Last Year: 1     Unstable Housing in the Last Year: No       ROS:  14 point ROS negative except as outlined above  Remainder review of systems is negative    Exam:  General:  alert, oriented and in no distress, cooperative  Head: Normocephalic, atraumatic.  Eyes:  EOMI. Pupils - equal, round, reactive to accomodation.  No icterus.  Normal Conjunctiva.   Oropharynx: moist and normal-appearing mucosa  Neck: supple, symmetrical, trachea midline and no JVD  Heart: Irregular, tachycardic, No: murmer, rub or gallop, S1 & S2 normal   Respiratory effort / Chest Inspection: unlabored  Lungs: Good air exchange, no wheezing, bilateral crackles at lung bases  Abdomen: flat, normal findings: bowel sounds normal and soft, non-tender  Lower Limbs:  no pitting edema  Pulses::  RLE - DP: present 2+                 LLE - DP: present 2+  Musculoskeletal: ROM grossly normal        DATA:      ECG:                     Telemetry: Atrial fibrillation. HR low 100s          Echocardiogram:           Ischemic Testing:         Weights:    Wt Readings from Last 3 Encounters:   01/23/24 132 kg (291 lb 14.2 oz)   11/09/23 129 kg (284 lb 12.8 oz)   09/20/23 128 kg (283 lb)   , Body mass index is 51.71 kg/m².         Lab Studies:             Results from last 7 days   Lab Units 01/23/24  0504 01/22/24  1539 01/22/24  1534   WBC Thousand/uL 8.28  --  12.75*   HEMOGLOBIN g/dL 9.2*  --  10.0*   I STAT HEMOGLOBIN g/dl  --  12.2  --    HEMATOCRIT % 31.9*  --  35.1   HEMATOCRIT, ISTAT %  --  36  --    PLATELETS Thousands/uL 270  --  320   ,   Results from last 7 days   Lab Units 01/23/24  0504 01/22/24  1539 01/22/24  1534   POTASSIUM mmol/L 3.8  --  3.3*   CHLORIDE mmol/L 93*  --  92*   CO2 mmol/L 35*  --  36*    CO2, I-STAT mmol/L  --  39*  --    BUN mg/dL 17  --  17   CREATININE mg/dL 0.75  --  1.32*   CALCIUM mg/dL 7.9*  --  8.3*   ALK PHOS U/L  --   --  140*   ALT U/L  --   --  22   AST U/L  --   --  22   GLUCOSE, ISTAT mg/dl  --  97  --

## 2024-01-23 NOTE — PROGRESS NOTES
[facility]  Progress Note  Name: Mattie Joy I  MRN: [id number]  Unit/Bed#: -01 I Date of Admission: 1/22/2024   Date of Service: 1/23/2024 I Hospital Day: 1    Assessment/Plan   * Acute on chronic hypercapnic respiratory failure (HCC)  Assessment & Plan  Baseline oxygen requirement 4 L continuously  Underlying severe COPD, severe pulmonary hypertension and diastolic heart failure  BNP-174  COVID/respiratory panel-negative  Chest x-ray consistent with pulmonary venous congestion, final reading pending  Currently requiring mid flow  IV Solu-Medrol discontinued  Continue with IV Bumex 4 mg BID  Continue with telemetry  Appreciate Cardiology and Pulmonology recommendations  Respiratory protocol  Monitor volume status  Strict I's and O's  Fluid restriction    Acute on chronic heart failure with preserved ejection fraction (HCC)  Assessment & Plan  Wt Readings from Last 3 Encounters:   01/23/24 132 kg (291 lb 14.2 oz)   11/09/23 129 kg (284 lb 12.8 oz)   09/20/23 128 kg (283 lb)     Appears volume overloaded  Worsening shortness of breath with bilateral lower extremity edema  Chest x-ray-pulmonary venous congestion  BNP-174, in obese patient  Echo 08/23-preserved EF, diastolic dysfunction, right ventricular systolic pressure 61  Home regimen-Bumex 2 mg twice daily  Continue with IV Bumex 4 mg twice daily  Fluid restriction 1500 mL  Salt restriction  Strict I's and O's  Appreciate Cardiology input        IVELISSE (acute kidney injury) (Formerly [facility])  Assessment & Plan  Recent Labs     01/22/24  1534 01/23/24  0504   CREATININE 1.32* 0.75   EGFR 42 83       Estimated Creatinine Clearance: 99.4 mL/min (by C-G formula based on SCr of 0.75 mg/dL).     Baseline creatinine-0.5-0.7  Etiology-prerenal azotemia in setting of volume overload/acute diastolic heart failure  Continue with IV Bumex  Urinary retention protocol  Avoid nephrotoxic agents  Monitor BMP daily    COPD, severe with acute  exacerbation  Assessment & Plan  Home regimen consists of benralizumab, Singulair, Pulmicort nebulization twice daily, Xopenex and albuterol as needed, Bevespi twice daily  Was seen at pulmonology office on 1/22, had respiratory distress, was given prednisone taper and urged to go to ED for evaluation  Currently on 5L NC  Baseline 4L NC  Status post IV Solu-Medrol 125 mg in ED  IV Solu-Medrol 40mg discontinued  Respiratory protocol  Continue with Pulmicort nebulization twice daily  Albuterol and Xopenex as needed  Singulair 10 mg daily  Inpatient consult to Pulmonology      Eosinophilic asthma  Assessment & Plan  On benralizumab every 8 weeks  Follows with Pulmonology    Morbid obesity (HCC)  Assessment & Plan  Counseling about adaptation of healthy dietary habits and lifestyle modifications, once medically stable  BMI >50  Affects all aspects of care    Pulmonary hypertension (HCC)  Assessment & Plan  As per recent echo in 08/23, right ventricular systolic pressure is severely elevated at 61 mmHg  Continue with IV Bumex  Continue to monitor volume status clinically    Paroxysmal atrial fibrillation (HCC)  Assessment & Plan  Rate controlled with Toprol- mg daily  Anticoagulated with Eliquis 5 mg twice daily  Continue to monitor potassium and magnesium, and replenish to goal    Type 2 diabetes mellitus with stage 2 chronic kidney disease, with long-term current use of insulin (HCC)  Assessment & Plan  Lab Results   Component Value Date    HGBA1C 8.5 (H) 01/22/2024       Recent Labs     01/22/24  1717 01/22/24  1838 01/22/24  2109 01/23/24  0722   POCGLU 76 292* 365* 297*       Blood Sugar Average: Last 72 hrs:  (P) 257.5  Currently uncontrolled, as per recent A1c  Home regimen consists of glargine 30 units at bedtime, insulin lispro 30 units with breakfast, 15 units with lunch and dinner, metformin 500 mg twice daily and Ozempic weekly  Continue with insulin glargine 30 units at bedtime  Insulin lispro 8 units  3 times daily AC  Additional coverage with sliding scale  Adjust regimen as needed  Carb consistent diet  Frequent Accu-Cheks and hypoglycemia protocol    Hypercholesterolemia  Assessment & Plan  Continue with atorvastatin 40 mg daily at bedtime    Obstructive sleep apnea  Assessment & Plan  Uses CPAP at home  Follows with pulmonology on outpatient, awaiting BiPAP study with transcutaneous oxygen monitoring  Continue with BiPAP while inpatient  Inpatient consult to Pulmonology pending    Esophageal reflux  Assessment & Plan  Continue with Protonix         VTE Pharmacologic Prophylaxis: VTE Score: 8 High Risk (Score >/= 5) - Pharmacological DVT Prophylaxis Ordered: apixaban (Eliquis). Sequential Compression Devices Ordered.    Mobility:   Basic Mobility Inpatient Raw Score: 23  JH-HLM Goal: 7: Walk 25 feet or more  JH-HLM Achieved: 7: Walk 25 feet or more  HLM Goal achieved. Continue to encourage appropriate mobility.    Patient Centered Rounds: I performed bedside rounds with nursing staff today.   Discussions with Specialists or Other Care Team Provider: Appreciate most recent pulmonology and cardiology notes.    Education and Discussions with Family / Patient: Patient declined call to .     Total Time Spent on Date of Encounter in care of patient: 35 mins. This time was spent on one or more of the following: performing physical exam; counseling and coordination of care; obtaining or reviewing history; documenting in the medical record; reviewing/ordering tests, medications or procedures; communicating with other healthcare professionals and discussing with patient's family/caregivers.    Current Length of Stay: 1 day(s)  Current Patient Status: Inpatient   Certification Statement: The patient will continue to require additional inpatient hospital stay due to IV Bumex  Discharge Plan: Anticipate discharge in 48-72 hrs to home.    Code Status: Level 1 - Full Code    Subjective:   Patient awake and  comfortably with BiPAP in place.  She offers no complaints, feels her breathing is improving a little bit.    Objective:     Vitals:   Temp (24hrs), Av.4 °F (36.9 °C), Min:98 °F (36.7 °C), Max:98.8 °F (37.1 °C)    Temp:  [98 °F (36.7 °C)-98.8 °F (37.1 °C)] 98.5 °F (36.9 °C)  HR:  [102-111] 103  Resp:  [18-21] 18  BP: (110-136)/(56-86) 122/79  SpO2:  [88 %-96 %] 92 %  Body mass index is 51.71 kg/m².     Input and Output Summary (last 24 hours):     Intake/Output Summary (Last 24 hours) at 2024 1051  Last data filed at 2024 1001  Gross per 24 hour   Intake 1860 ml   Output 2175 ml   Net -315 ml       Physical Exam:   Physical Exam  Vitals and nursing note reviewed.   Constitutional:       General: She is not in acute distress.     Appearance: Normal appearance. She is well-developed. She is morbidly obese.   HENT:      Head: Normocephalic and atraumatic.   Eyes:      General: No scleral icterus.     Conjunctiva/sclera: Conjunctivae normal.   Cardiovascular:      Rate and Rhythm: Normal rate and regular rhythm.   Pulmonary:      Effort: Pulmonary effort is normal.      Breath sounds: Rales present. No wheezing or rhonchi.   Abdominal:      General: There is no distension.      Palpations: Abdomen is soft.   Musculoskeletal:      Right lower leg: Edema present.      Left lower leg: Edema present.   Skin:     General: Skin is warm and dry.   Neurological:      General: No focal deficit present.      Mental Status: She is alert.   Psychiatric:         Mood and Affect: Mood normal.        Additional Data:     Labs:  Results from last 7 days   Lab Units 24  0504 24  1539 24  1534   WBC Thousand/uL 8.28  --  12.75*   HEMOGLOBIN g/dL 9.2*  --  10.0*   I STAT HEMOGLOBIN   --    < >  --    HEMATOCRIT % 31.9*  --  35.1   HEMATOCRIT, ISTAT   --    < >  --    PLATELETS Thousands/uL 270  --  320   NEUTROS PCT %  --   --  80*   LYMPHS PCT %  --   --  12*   MONOS PCT %  --   --  7   EOS PCT %  --   --   0    < > = values in this interval not displayed.     Results from last 7 days   Lab Units 01/23/24  0504 01/22/24  1539 01/22/24  1534   SODIUM mmol/L 137  --  140   POTASSIUM mmol/L 3.8  --  3.3*   CHLORIDE mmol/L 93*  --  92*   CO2 mmol/L 35*  --  36*   CO2, I-STAT   --    < >  --    BUN mg/dL 17  --  17   CREATININE mg/dL 0.75  --  1.32*   ANION GAP mmol/L 9  --  12   CALCIUM mg/dL 7.9*  --  8.3*   ALBUMIN g/dL  --   --  4.1   TOTAL BILIRUBIN mg/dL  --   --  0.64   ALK PHOS U/L  --   --  140*   ALT U/L  --   --  22   AST U/L  --   --  22   GLUCOSE RANDOM mg/dL 308*  --  97    < > = values in this interval not displayed.         Results from last 7 days   Lab Units 01/23/24  0722 01/22/24  2109 01/22/24  1838 01/22/24  1717   POC GLUCOSE mg/dl 297* 365* 292* 76     Results from last 7 days   Lab Units 01/22/24  1534   HEMOGLOBIN A1C % 8.5*           Lines/Drains:  Invasive Devices       Peripheral Intravenous Line  Duration             Peripheral IV 01/22/24 Proximal;Right;Ventral (anterior) Forearm <1 day                      Telemetry:  Telemetry Orders (From admission, onward)               24 Hour Telemetry Monitoring  Continuous x 24 Hours (Telem)        Question:  Reason for 24 Hour Telemetry  Answer:  Decompensated CHF- and any one of the following: continuous diuretic infusion or total diuretic dose >200 mg daily, associated electrolyte derangement (I.e. K < 3.0), ionotropic drip (continuous infusion), hx of ventricular arrhythmia, or new EF < 35%                     Telemetry Reviewed: Atrial fibrillation. HR averaging 100s  Indication for Continued Telemetry Use: Arrthymias requiring medical therapy             Imaging: Reviewed radiology reports from this admission including: chest xray    Recent Cultures (last 7 days):         Last 24 Hours Medication List:   Current Facility-Administered Medications   Medication Dose Route Frequency Provider Last Rate    acetaminophen  650 mg Oral Q6H PRN Rosamaria Miller  ROSALIO      albuterol  2 puff Inhalation Q4H PRN Hawa Marsh MD      apixaban  5 mg Oral BID Hawa Marsh MD      atorvastatin  40 mg Oral Daily With Dinner Hawa Marsh MD      budesonide  0.5 mg Nebulization Q12H Hawa Marsh MD      bumetanide  4 mg Intravenous BID Hawa Marsh MD      docusate sodium  100 mg Oral BID Hawa Marsh MD      escitalopram  20 mg Oral Daily Hawa Marsh MD      insulin glargine  30 Units Subcutaneous HS Hawa Marsh MD      insulin lispro  1-5 Units Subcutaneous TID AC Hawa Marsh MD      insulin lispro  1-5 Units Subcutaneous HS Hawa Marsh MD      insulin lispro  8 Units Subcutaneous TID With Meals Hawa Marsh MD      loratadine  10 mg Oral Daily Hawa Marsh MD      metoprolol succinate  100 mg Oral Daily Hawa Marsh MD      montelukast  10 mg Oral HS Hawa Marsh MD      pantoprazole  40 mg Oral Daily Before Breakfast Hawa Marsh MD      potassium chloride  40 mEq Oral Daily Hawa Marsh MD      traZODone  150 mg Oral HS Hawa Marsh MD       Facility-Administered Medications Ordered in Other Encounters   Medication Dose Route Frequency Provider Last Rate    [MAR Hold] EPINEPHrine PF  0.3 mg Intramuscular Q15 Min PRN Elpidio Salamanca MD          Today, Patient Was Seen By: Kasey Richardson PA-C    **Please Note: This note may have been constructed using a voice recognition system.**

## 2024-01-23 NOTE — PHYSICAL THERAPY NOTE
Physical Therapy Screen    Patient Name: Mattie Joy    Today's Date: 1/23/2024     Problem List  Principal Problem:    Acute on chronic hypercapnic respiratory failure (HCC)  Active Problems:    Esophageal reflux    Obstructive sleep apnea    Hypercholesterolemia    Type 2 diabetes mellitus with stage 2 chronic kidney disease, with long-term current use of insulin (HCC)    Paroxysmal atrial fibrillation (HCC)    Acute on chronic heart failure with preserved ejection fraction (HCC)    Pulmonary hypertension (HCC)    Morbid obesity ([facility])    Eosinophilic asthma    COPD, severe with acute exacerbation    IVELISSE (acute kidney injury) ([facility])       Past Medical History  Past Medical History:   Diagnosis Date    Acute blood loss anemia 06/01/2021    Acute diverticulitis 05/02/2021    Acute on chronic diastolic congestive heart failure (HCC) 05/21/2020    Acute on chronic respiratory failure with hypoxia ([facility]) 11/30/2018    Alcohol abuse 05/21/2020    Anemia     Asthma with COPD with exacerbation  11/12/2020    Atrial fibrillation ([facility])     Cervical radiculopathy     CHF (congestive heart failure) ([facility])     Chronic low back pain     Chronic obstructive asthma 02/20/2018    Cigarette nicotine dependence without complication 11/20/2019    Quit 12/10/2019.     Community acquired pneumonia 05/21/2020    COPD exacerbation ([facility]) 09/16/2020    Depression with anxiety 03/09/2014    Diabetes mellitus with hyperglycemia ([facility])     Diverticulitis 06/06/2021    Elevated liver enzymes     GERD (gastroesophageal reflux disease)     Hypersomnolence 10/28/2020    Hypokalemia 12/04/2018    Lower gastrointestinal bleeding 06/01/2021    Paresthesia of upper extremity     Plantar fasciitis     Restless legs syndrome 04/08/2014    Severe persistent asthma with exacerbation 02/20/2018    PFTs February 8, 2018:  FEV1 0.87 L-35% predicted, 27% improvement post-bronchodilator.  DLCO-82% predicted,   The patient has been having worsening of her symptoms now for few months, especially  from 2020, also she had multiple exacerbations last year and most recently required a steroid burst and August  Reviewing her CT scan  New PFTs with severe obstruction reviewed  Hypersensitivity p    Sexual dysfunction     Sleep apnea, obstructive     Tenosynovitis of finger     Tinea corporis     Tobacco use 2018    Currently smoking 3-4 cigarettes daily    Trigger middle finger of left hand 2017    Trigger ring finger of left hand 2017    Weakness of upper extremity         Past Surgical History  Past Surgical History:   Procedure Laterality Date    ABDOMINAL SURGERY      CARPAL TUNNEL RELEASE Bilateral      SECTION      CHOLECYSTECTOMY      laparoscopic    ESOPHAGOGASTRODUODENOSCOPY N/A 12/3/2018    Procedure: ESOPHAGOGASTRODUODENOSCOPY (EGD) AND COLONOSCOPY;  Surgeon: Ludmila Perry MD;  Location: [facility];  Service: Gastroenterology    GASTRIC BYPASS      for morbid obesity with gilda en y    HERNIA REPAIR      HYSTERECTOMY      LA EXCISION GANGLION WRIST DORSAL/VOLAR PRIMARY Left 2017    Procedure: LONG FINGER GANGLION CYST EXCISION;  Surgeon: Philippe Clark MD;  Location: [facility];  Service: Orthopedics    LA TENDON SHEATH INCISION Left 2017    Procedure: THUMB, LONG, RING, TRIGGER FINGER RELEASE;  Surgeon: Philippe Clark MD;  Location: [facility];  Service: Orthopedics    SHOULDER SURGERY Right         Per nursing and OTR, pt ambulating (I)ly at this time. No skilled inpt PT services needed at this time, DC pt from skilled inpt PT. Please reconsult inpt PT services as needed.

## 2024-01-23 NOTE — ASSESSMENT & PLAN NOTE
Lab Results   Component Value Date    HGBA1C 8.5 (H) 01/22/2024       Recent Labs     01/22/24  1717 01/22/24  1838 01/22/24  2109 01/23/24  0722   POCGLU 76 292* 365* 297*       Blood Sugar Average: Last 72 hrs:  (P) 257.5  Currently uncontrolled, as per recent A1c  Home regimen consists of glargine 30 units at bedtime, insulin lispro 30 units with breakfast, 15 units with lunch and dinner, metformin 500 mg twice daily and Ozempic weekly  Continue with insulin glargine 30 units at bedtime  Insulin lispro 8 units 3 times daily AC  Additional coverage with sliding scale  Adjust regimen as needed  Carb consistent diet  Frequent Accu-Cheks and hypoglycemia protocol

## 2024-01-23 NOTE — ASSESSMENT & PLAN NOTE
Baseline oxygen requirement 4 L continuously  Underlying severe COPD, severe pulmonary hypertension and diastolic heart failure  BNP-174  COVID/respiratory panel-negative  Chest x-ray consistent with pulmonary venous congestion, final reading pending  Currently requiring mid flow  IV Solu-Medrol discontinued  Continue with IV Bumex 4 mg BID  Continue with telemetry  Appreciate Cardiology and Pulmonology recommendations  Respiratory protocol  Monitor volume status  Strict I's and O's  Fluid restriction

## 2024-01-23 NOTE — TELEPHONE ENCOUNTER
Patients family called to advise Vicki is currently admitted in the hospital, she is being triaged by Pulmonary. They wanted to advise that they will be getting Vicki into Palliative Care. Thank you

## 2024-01-23 NOTE — ASSESSMENT & PLAN NOTE
Uses CPAP at home  Follows with pulmonology on outpatient, awaiting BiPAP study with transcutaneous oxygen monitoring  Continue with BiPAP while inpatient  Inpatient consult to Pulmonology pending

## 2024-01-24 LAB
ANION GAP SERPL CALCULATED.3IONS-SCNC: 6 MMOL/L
BASOPHILS # BLD AUTO: 0.07 THOUSANDS/ÂΜL (ref 0–0.1)
BASOPHILS NFR BLD AUTO: 1 % (ref 0–1)
BUN SERPL-MCNC: 19 MG/DL (ref 5–25)
CALCIUM SERPL-MCNC: 8 MG/DL (ref 8.4–10.2)
CHLORIDE SERPL-SCNC: 91 MMOL/L (ref 96–108)
CO2 SERPL-SCNC: 39 MMOL/L (ref 21–32)
CREAT SERPL-MCNC: 0.72 MG/DL (ref 0.6–1.3)
EOSINOPHIL # BLD AUTO: 0 THOUSAND/ÂΜL (ref 0–0.61)
EOSINOPHIL NFR BLD AUTO: 0 % (ref 0–6)
ERYTHROCYTE [DISTWIDTH] IN BLOOD BY AUTOMATED COUNT: 18 % (ref 11.6–15.1)
GFR SERPL CREATININE-BSD FRML MDRD: 88 ML/MIN/1.73SQ M
GLUCOSE SERPL-MCNC: 161 MG/DL (ref 65–140)
GLUCOSE SERPL-MCNC: 209 MG/DL (ref 65–140)
GLUCOSE SERPL-MCNC: 222 MG/DL (ref 65–140)
GLUCOSE SERPL-MCNC: 258 MG/DL (ref 65–140)
HCT VFR BLD AUTO: 33.9 % (ref 34.8–46.1)
HGB BLD-MCNC: 9.4 G/DL (ref 11.5–15.4)
IMM GRANULOCYTES # BLD AUTO: 0.07 THOUSAND/UL (ref 0–0.2)
IMM GRANULOCYTES NFR BLD AUTO: 1 % (ref 0–2)
LYMPHOCYTES # BLD AUTO: 1.88 THOUSANDS/ÂΜL (ref 0.6–4.47)
LYMPHOCYTES NFR BLD AUTO: 14 % (ref 14–44)
MCH RBC QN AUTO: 19.5 PG (ref 26.8–34.3)
MCHC RBC AUTO-ENTMCNC: 27.7 G/DL (ref 31.4–37.4)
MCV RBC AUTO: 70 FL (ref 82–98)
MONOCYTES # BLD AUTO: 1.02 THOUSAND/ÂΜL (ref 0.17–1.22)
MONOCYTES NFR BLD AUTO: 8 % (ref 4–12)
NEUTROPHILS # BLD AUTO: 10.28 THOUSANDS/ÂΜL (ref 1.85–7.62)
NEUTS SEG NFR BLD AUTO: 76 % (ref 43–75)
NRBC BLD AUTO-RTO: 0 /100 WBCS
PLATELET # BLD AUTO: 315 THOUSANDS/UL (ref 149–390)
PMV BLD AUTO: 11.4 FL (ref 8.9–12.7)
POTASSIUM SERPL-SCNC: 3.6 MMOL/L (ref 3.5–5.3)
RBC # BLD AUTO: 4.82 MILLION/UL (ref 3.81–5.12)
SODIUM SERPL-SCNC: 136 MMOL/L (ref 135–147)
WBC # BLD AUTO: 13.32 THOUSAND/UL (ref 4.31–10.16)

## 2024-01-24 PROCEDURE — 94660 CPAP INITIATION&MGMT: CPT

## 2024-01-24 PROCEDURE — 99232 SBSQ HOSP IP/OBS MODERATE 35: CPT | Performed by: PHYSICIAN ASSISTANT

## 2024-01-24 PROCEDURE — 80048 BASIC METABOLIC PNL TOTAL CA: CPT | Performed by: PHYSICIAN ASSISTANT

## 2024-01-24 PROCEDURE — 85025 COMPLETE CBC W/AUTO DIFF WBC: CPT | Performed by: PHYSICIAN ASSISTANT

## 2024-01-24 PROCEDURE — 99232 SBSQ HOSP IP/OBS MODERATE 35: CPT | Performed by: INTERNAL MEDICINE

## 2024-01-24 PROCEDURE — 82948 REAGENT STRIP/BLOOD GLUCOSE: CPT

## 2024-01-24 PROCEDURE — 94640 AIRWAY INHALATION TREATMENT: CPT

## 2024-01-24 PROCEDURE — 94760 N-INVAS EAR/PLS OXIMETRY 1: CPT

## 2024-01-24 RX ADMIN — INSULIN LISPRO 8 UNITS: 100 INJECTION, SOLUTION INTRAVENOUS; SUBCUTANEOUS at 08:06

## 2024-01-24 RX ADMIN — INSULIN LISPRO 8 UNITS: 100 INJECTION, SOLUTION INTRAVENOUS; SUBCUTANEOUS at 11:56

## 2024-01-24 RX ADMIN — APIXABAN 5 MG: 5 TABLET, FILM COATED ORAL at 17:23

## 2024-01-24 RX ADMIN — INSULIN LISPRO 8 UNITS: 100 INJECTION, SOLUTION INTRAVENOUS; SUBCUTANEOUS at 17:29

## 2024-01-24 RX ADMIN — BUDESONIDE 0.5 MG: 0.5 INHALANT ORAL at 07:11

## 2024-01-24 RX ADMIN — BUMETANIDE 4 MG: 0.25 INJECTION INTRAMUSCULAR; INTRAVENOUS at 08:06

## 2024-01-24 RX ADMIN — INSULIN LISPRO 1 UNITS: 100 INJECTION, SOLUTION INTRAVENOUS; SUBCUTANEOUS at 21:40

## 2024-01-24 RX ADMIN — APIXABAN 5 MG: 5 TABLET, FILM COATED ORAL at 08:07

## 2024-01-24 RX ADMIN — BUMETANIDE 4 MG: 0.25 INJECTION INTRAMUSCULAR; INTRAVENOUS at 17:22

## 2024-01-24 RX ADMIN — INSULIN LISPRO 1 UNITS: 100 INJECTION, SOLUTION INTRAVENOUS; SUBCUTANEOUS at 08:06

## 2024-01-24 RX ADMIN — DOCUSATE SODIUM 100 MG: 100 CAPSULE, LIQUID FILLED ORAL at 17:23

## 2024-01-24 RX ADMIN — DOCUSATE SODIUM 100 MG: 100 CAPSULE, LIQUID FILLED ORAL at 08:07

## 2024-01-24 RX ADMIN — TRAZODONE HYDROCHLORIDE 150 MG: 100 TABLET ORAL at 21:41

## 2024-01-24 RX ADMIN — LORATADINE 10 MG: 10 TABLET ORAL at 08:07

## 2024-01-24 RX ADMIN — PANTOPRAZOLE SODIUM 40 MG: 40 TABLET, DELAYED RELEASE ORAL at 06:25

## 2024-01-24 RX ADMIN — POTASSIUM CHLORIDE 40 MEQ: 1500 TABLET, EXTENDED RELEASE ORAL at 08:07

## 2024-01-24 RX ADMIN — EMPAGLIFLOZIN 10 MG: 10 TABLET, FILM COATED ORAL at 11:53

## 2024-01-24 RX ADMIN — METOPROLOL SUCCINATE 100 MG: 50 TABLET, EXTENDED RELEASE ORAL at 08:07

## 2024-01-24 RX ADMIN — BUDESONIDE 0.5 MG: 0.5 INHALANT ORAL at 19:25

## 2024-01-24 RX ADMIN — ATORVASTATIN CALCIUM 40 MG: 40 TABLET, FILM COATED ORAL at 17:23

## 2024-01-24 RX ADMIN — INSULIN LISPRO 1 UNITS: 100 INJECTION, SOLUTION INTRAVENOUS; SUBCUTANEOUS at 17:30

## 2024-01-24 RX ADMIN — MONTELUKAST 10 MG: 10 TABLET, FILM COATED ORAL at 21:41

## 2024-01-24 RX ADMIN — ESCITALOPRAM OXALATE 20 MG: 20 TABLET ORAL at 08:07

## 2024-01-24 RX ADMIN — INSULIN GLARGINE 30 UNITS: 100 INJECTION, SOLUTION SUBCUTANEOUS at 21:41

## 2024-01-24 RX ADMIN — INSULIN LISPRO 2 UNITS: 100 INJECTION, SOLUTION INTRAVENOUS; SUBCUTANEOUS at 11:57

## 2024-01-24 NOTE — PROGRESS NOTES
[facility]  Progress Note  Name: Mattie Joy I  MRN: [id number]  Unit/Bed#: -01 I Date of Admission: 1/22/2024   Date of Service: 1/24/2024 I Hospital Day: 2    Assessment/Plan   * Acute on chronic hypercapnic respiratory failure (HCC)  Assessment & Plan  Baseline oxygen requirement 4 L continuously  Underlying severe COPD, severe pulmonary hypertension and diastolic heart failure  BNP-174  COVID/respiratory panel-negative  Chest x-ray consistent with pulmonary venous congestion, final reading pending  Currently requiring mid flow  IV Solu-Medrol discontinued  Continue with IV Bumex 4 mg BID  Continue with telemetry  Appreciate Cardiology and Pulmonology recommendations  Respiratory protocol  Monitor volume status  Strict I's and O's  Fluid restriction    Acute on chronic heart failure with preserved ejection fraction (HCC)  Assessment & Plan  Wt Readings from Last 3 Encounters:   01/24/24 131 kg (289 lb 1.6 oz)   11/09/23 129 kg (284 lb 12.8 oz)   09/20/23 128 kg (283 lb)     Appears volume overloaded  Worsening shortness of breath with bilateral lower extremity edema  Chest x-ray-pulmonary venous congestion  BNP-174, in obese patient  Echo 08/23-preserved EF, diastolic dysfunction, right ventricular systolic pressure 61  Home regimen-Bumex 2 mg twice daily  Continue with IV Bumex 4 mg twice daily  Fluid restriction 1500 mL  Salt restriction  Strict I's and O's  Appreciate Cardiology input        IVELISSE (acute kidney injury) ([facility])  Assessment & Plan  Recent Labs     01/22/24  1534 01/23/24  0504 01/24/24  0458   CREATININE 1.32* 0.75 0.72   EGFR 42 83 88       Estimated Creatinine Clearance: 103.1 mL/min (by C-G formula based on SCr of 0.72 mg/dL).     Baseline creatinine-0.5-0.7  Etiology-prerenal azotemia in setting of volume overload/acute diastolic heart failure  Continue with IV Bumex  Urinary retention protocol  Avoid nephrotoxic agents  Monitor BMP daily    COPD, severe with  acute exacerbation  Assessment & Plan  Home regimen consists of benralizumab, Singulair, Pulmicort nebulization twice daily, Xopenex and albuterol as needed, Bevespi twice daily  Was seen at pulmonology office on 1/22, had respiratory distress, was given prednisone taper and urged to go to ED for evaluation  Currently on 5L NC  Baseline 4L NC  Status post IV Solu-Medrol 125 mg in ED  IV Solu-Medrol 40mg discontinued  Respiratory protocol  Continue with Pulmicort nebulization twice daily  Albuterol and Xopenex as needed  Singulair 10 mg daily  Inpatient consult to Pulmonology      Eosinophilic asthma  Assessment & Plan  On benralizumab every 8 weeks  Follows with Pulmonology    Morbid obesity (HCC)  Assessment & Plan  Counseling about adaptation of healthy dietary habits and lifestyle modifications, once medically stable  BMI >50  Affects all aspects of care    Pulmonary hypertension (HCC)  Assessment & Plan  As per recent echo in 08/23, right ventricular systolic pressure is severely elevated at 61 mmHg  Continue with IV Bumex  Continue to monitor volume status clinically    Paroxysmal atrial fibrillation (HCC)  Assessment & Plan  Rate controlled with Toprol- mg daily  Anticoagulated with Eliquis 5 mg twice daily  Continue to monitor potassium and magnesium, and replenish to goal    Type 2 diabetes mellitus with stage 2 chronic kidney disease, with long-term current use of insulin (HCC)  Assessment & Plan  Lab Results   Component Value Date    HGBA1C 8.5 (H) 01/22/2024       Recent Labs     01/23/24  1110 01/23/24  1621 01/23/24 2025 01/24/24  0721   POCGLU 303* 398* 328* 209*         Blood Sugar Average: Last 72 hrs:  (P) 283.5  Currently uncontrolled, as per recent A1c  Home regimen consists of glargine 30 units at bedtime, insulin lispro 30 units with breakfast, 15 units with lunch and dinner, metformin 500 mg twice daily and Ozempic weekly  Continue with insulin glargine 30 units at bedtime  Insulin  lispro 8 units 3 times daily AC  Additional coverage with sliding scale  Adjust regimen as needed  Carb consistent diet  Frequent Accu-Cheks and hypoglycemia protocol    Hypercholesterolemia  Assessment & Plan  Continue with atorvastatin 40 mg daily at bedtime    Obstructive sleep apnea  Assessment & Plan  Uses CPAP at home  Follows with pulmonology on outpatient, awaiting BiPAP study with transcutaneous oxygen monitoring  Continue with BiPAP while inpatient  Inpatient consult to Pulmonology pending    Esophageal reflux  Assessment & Plan  Continue with Protonix         VTE Pharmacologic Prophylaxis: VTE Score: 8 High Risk (Score >/= 5) - Pharmacological DVT Prophylaxis Ordered: apixaban (Eliquis). Sequential Compression Devices Ordered.    Mobility:   Basic Mobility Inpatient Raw Score: 23  JH-HLM Goal: 7: Walk 25 feet or more  JH-HLM Achieved: 4: Move to chair/commode  HLM Goal NOT achieved. Continue with multidisciplinary rounding and encourage appropriate mobility to improve upon HLM goals.    Patient Centered Rounds: I performed bedside rounds with nursing staff today.   Discussions with Specialists or Other Care Team Provider: Appreciate most recent Pulmonology and Cardiology notes.    Education and Discussions with Family / Patient: Patient declined call to .     Total Time Spent on Date of Encounter in care of patient: 35 mins. This time was spent on one or more of the following: performing physical exam; counseling and coordination of care; obtaining or reviewing history; documenting in the medical record; reviewing/ordering tests, medications or procedures; communicating with other healthcare professionals and discussing with patient's family/caregivers.    Current Length of Stay: 2 day(s)  Current Patient Status: Inpatient   Certification Statement: The patient will continue to require additional inpatient hospital stay due to IV Bumex  Discharge Plan: Anticipate discharge in 48-72 hrs to  home.    Code Status: Level 1 - Full Code    Subjective:   Patient comfortably relaxing with BiPAP. Reports some improvement in SOB and edema. Offers no additional complaints or questions.    Objective:     Vitals:   Temp (24hrs), Av.2 °F (36.8 °C), Min:98.2 °F (36.8 °C), Max:98.2 °F (36.8 °C)    Temp:  [98.2 °F (36.8 °C)] 98.2 °F (36.8 °C)  HR:  [] 91  Resp:  [18-19] 19  BP: (119-125)/(72-78) 119/72  SpO2:  [91 %-100 %] 100 %  Body mass index is 51.21 kg/m².     Input and Output Summary (last 24 hours):     Intake/Output Summary (Last 24 hours) at 2024 1058  Last data filed at 2024 0801  Gross per 24 hour   Intake 1264 ml   Output 3200 ml   Net -1936 ml       Physical Exam:   Physical Exam  Vitals and nursing note reviewed.   Constitutional:       General: She is not in acute distress.     Appearance: Normal appearance. She is well-developed.   HENT:      Head: Normocephalic and atraumatic.   Eyes:      General: No scleral icterus.     Conjunctiva/sclera: Conjunctivae normal.   Cardiovascular:      Rate and Rhythm: Normal rate and regular rhythm.      Heart sounds: No murmur heard.  Pulmonary:      Effort: Pulmonary effort is normal.      Breath sounds: Decreased air movement present. Decreased breath sounds present. No wheezing, rhonchi or rales.   Abdominal:      General: There is no distension.      Palpations: Abdomen is soft.   Musculoskeletal:      Right lower leg: Edema present.      Left lower leg: Edema present.   Skin:     General: Skin is warm and dry.   Neurological:      General: No focal deficit present.      Mental Status: She is alert.   Psychiatric:         Mood and Affect: Mood normal.        Additional Data:     Labs:  Results from last 7 days   Lab Units 24  0458   WBC Thousand/uL 13.32*   HEMOGLOBIN g/dL 9.4*   HEMATOCRIT % 33.9*   PLATELETS Thousands/uL 315   NEUTROS PCT % 76*   LYMPHS PCT % 14   MONOS PCT % 8   EOS PCT % 0     Results from last 7 days   Lab Units  01/24/24  0458 01/22/24  1539 01/22/24  1534   SODIUM mmol/L 136   < > 140   POTASSIUM mmol/L 3.6   < > 3.3*   CHLORIDE mmol/L 91*   < > 92*   CO2 mmol/L 39*   < > 36*   CO2, I-STAT   --    < >  --    BUN mg/dL 19   < > 17   CREATININE mg/dL 0.72   < > 1.32*   ANION GAP mmol/L 6   < > 12   CALCIUM mg/dL 8.0*   < > 8.3*   ALBUMIN g/dL  --   --  4.1   TOTAL BILIRUBIN mg/dL  --   --  0.64   ALK PHOS U/L  --   --  140*   ALT U/L  --   --  22   AST U/L  --   --  22   GLUCOSE RANDOM mg/dL 222*   < > 97    < > = values in this interval not displayed.         Results from last 7 days   Lab Units 01/24/24  0721 01/23/24  2025 01/23/24  1621 01/23/24  1110 01/23/24  0722 01/22/24  2109 01/22/24  1838 01/22/24  1717   POC GLUCOSE mg/dl 209* 328* 398* 303* 297* 365* 292* 76     Results from last 7 days   Lab Units 01/22/24  1534   HEMOGLOBIN A1C % 8.5*           Lines/Drains:  Invasive Devices       Peripheral Intravenous Line  Duration             Peripheral IV 01/22/24 Proximal;Right;Ventral (anterior) Forearm 1 day                      Telemetry:  Telemetry Orders (From admission, onward)               24 Hour Telemetry Monitoring  Continuous x 24 Hours (Telem)        Question:  Reason for 24 Hour Telemetry  Answer:  Decompensated CHF- and any one of the following: continuous diuretic infusion or total diuretic dose >200 mg daily, associated electrolyte derangement (I.e. K < 3.0), ionotropic drip (continuous infusion), hx of ventricular arrhythmia, or new EF < 35%                     Telemetry Reviewed: Atrial fibrillation. HR averaging 100s  Indication for Continued Telemetry Use: Arrthymias requiring medical therapy             Imaging: Reviewed radiology reports from this admission including: chest xray    Recent Cultures (last 7 days):         Last 24 Hours Medication List:   Current Facility-Administered Medications   Medication Dose Route Frequency Provider Last Rate    acetaminophen  650 mg Oral Q6H PRN Rosamaria Miller  ROSALIO      albuterol  2 puff Inhalation Q4H PRN Hawa Marsh MD      apixaban  5 mg Oral BID Hawa Marsh MD      atorvastatin  40 mg Oral Daily With Dinner Hawa Marsh MD      budesonide  0.5 mg Nebulization Q12H Hawa Marsh MD      bumetanide  4 mg Intravenous BID Hawa Marsh MD      docusate sodium  100 mg Oral BID Hawa Marsh MD      Empagliflozin  10 mg Oral Daily Chuck Harrell PA-C      escitalopram  20 mg Oral Daily Hawa Marsh MD      insulin glargine  30 Units Subcutaneous HS Hawa Marsh MD      insulin lispro  1-5 Units Subcutaneous TID AC Hawa Marsh MD      insulin lispro  1-5 Units Subcutaneous HS Hawa Marsh MD      insulin lispro  8 Units Subcutaneous TID With Meals Hawa Marsh MD      loratadine  10 mg Oral Daily Hawa Marsh MD      metoprolol succinate  100 mg Oral Daily Hawa Marsh MD      montelukast  10 mg Oral HS Hawa Marsh MD      pantoprazole  40 mg Oral Daily Before Breakfast Hawa Marsh MD      potassium chloride  40 mEq Oral Daily Hawa Marsh MD      traZODone  150 mg Oral HS Hawa Marsh MD       Facility-Administered Medications Ordered in Other Encounters   Medication Dose Route Frequency Provider Last Rate    [MAR Hold] EPINEPHrine PF  0.3 mg Intramuscular Q15 Min PRN Elpidio Salamanca MD          Today, Patient Was Seen By: Kasey Richardson PA-C    **Please Note: This note may have been constructed using a voice recognition system.**

## 2024-01-24 NOTE — ASSESSMENT & PLAN NOTE
Lab Results   Component Value Date    HGBA1C 8.5 (H) 01/22/2024       Recent Labs     01/23/24  1110 01/23/24  1621 01/23/24 2025 01/24/24  0721   POCGLU 303* 398* 328* 209*         Blood Sugar Average: Last 72 hrs:  (P) 283.5  Currently uncontrolled, as per recent A1c  Home regimen consists of glargine 30 units at bedtime, insulin lispro 30 units with breakfast, 15 units with lunch and dinner, metformin 500 mg twice daily and Ozempic weekly  Continue with insulin glargine 30 units at bedtime  Insulin lispro 8 units 3 times daily AC  Additional coverage with sliding scale  Adjust regimen as needed  Carb consistent diet  Frequent Accu-Cheks and hypoglycemia protocol

## 2024-01-24 NOTE — PROGRESS NOTES
Progress Note - Cardiology   Mattie Joy 63 y.o. female MRN: [id number]  Unit/Bed#: -01 Encounter: [id number]        Problem List:  Principal Problem:    Acute on chronic hypercapnic respiratory failure (HCC)  Active Problems:    Esophageal reflux    Obstructive sleep apnea    Hypercholesterolemia    Type 2 diabetes mellitus with stage 2 chronic kidney disease, with long-term current use of insulin (HCC)    Paroxysmal atrial fibrillation (HCC)    Pulmonary hypertension (HCC)    Morbid obesity (HCC)    Eosinophilic asthma    COPD, severe with acute exacerbation    IVELISSE (acute kidney injury) (HCC)    Acute on chronic heart failure with preserved ejection fraction (HCC)      Assessment:  Acute on chronic respiratory failure  Baseline oxygen requirements 4 L nasal cannula  Initially on admission patient noted to be hypoxic with saturations in the upper 80s while on 5 L nasal cannula, she has at maximum this admission thus far required 9-10 L but was able to wean down as of 1/23/2024  Etiology multifactorial in light of congestive heart failure as well as COPD exacerbation  Acute on chronic diastolic heart failure  8/15/2023 echo: LVEF 65%, grade 2 diastolic dysfunction, severely elevated RV systolic pressure 61 mmHg  Prehospital diuretic: Bumex 4 twice daily with 40 potassium daily  Prior weight as low as 278 in August 2023 at which time she was reportedly euvolemic  Paroxysmal atrial fibrillation  Patient in atrial fibrillation on arrival with rates in the low 100s  Thromboembolic PPx: Eliquis 5 twice daily  Rate control: Toprol- daily  COPD with acute exacerbation  Type 2 diabetes  Obesity, BMI 51  Pulmonary hypertension likely group 2/3 from left heart disease, underlying lung disease, OHV  Acute kidney injury  Suspect cardiorenal  Creatinine 1.32 on arrival which has improved with IV diuretics    -Standing weight 289 pounds today  -Good urine output yesterday -3.5 L, total net -2.2 this admission  -Renal  function and electrolytes are stable today  -Remains in A-fib with some intermittent tachycardia    Plan/ Discussion:  Continue IV Bumex 4 twice daily  Begin Jardiance 10 daily  Continue metoprolol 100 daily  Continue potassium 40 daily  Continue Eliquis 5 twice daily  Check daily BMP and standing weights and follow I/O's as possible    Subjective:  Breathing somewhat better today compared to yesterday. Still urinating frequently    Vitals:  Vitals:    01/23/24 0533 01/24/24 0502   Weight: 132 kg (291 lb 14.2 oz) 131 kg (289 lb 1.6 oz)   ,  Vitals:    01/23/24 1717 01/23/24 2030 01/24/24 0502 01/24/24 0723   BP: 125/78 124/78  119/72   BP Location:  Right arm  Right arm   Pulse: (!) 112 (!) 108  91   Resp:  18  19   Temp:  98.2 °F (36.8 °C)     TempSrc:  Oral     SpO2: 91% 94%  100%   Weight:   131 kg (289 lb 1.6 oz)    Height:           Exam:  General: Alert awake and oriented, no acute distress  Heart:  Regular rate and rhythm, no murmurs, Normal S1, no edema    Respiratory effort/ Lungs:  Breathing comfortably on bipap, clear bilaterally without wheezing, rales, crackles   Abdominal: Non-tender to palpation, + bowel sounds, soft, no masses or distension  Lower Limbs:  No edema            Telemetry:       Atrial fibrillation, Heart Rate 90's    Medications:    Current Facility-Administered Medications:     acetaminophen (TYLENOL) tablet 650 mg, 650 mg, Oral, Q6H PRN, Rosamaria Miller PA-C, 650 mg at 01/22/24 2127    albuterol (PROVENTIL HFA,VENTOLIN HFA) inhaler 2 puff, 2 puff, Inhalation, Q4H PRN, Hawa Marsh MD, 2 puff at 01/23/24 1520    apixaban (ELIQUIS) tablet 5 mg, 5 mg, Oral, BID, Hawa Marsh MD, 5 mg at 01/24/24 0807    atorvastatin (LIPITOR) tablet 40 mg, 40 mg, Oral, Daily With Dinner, Hawa Marsh MD, 40 mg at 01/23/24 1711    budesonide (PULMICORT) inhalation solution 0.5 mg, 0.5 mg, Nebulization, Q12H, Hawa Marsh MD, 0.5 mg at 01/24/24 0711    bumetanide (BUMEX) injection 4 mg, 4 mg, Intravenous, BID,  Hawa Marsh MD, 4 mg at 01/24/24 0806    docusate sodium (COLACE) capsule 100 mg, 100 mg, Oral, BID, Hawa Marsh MD, 100 mg at 01/24/24 0807    escitalopram (LEXAPRO) tablet 20 mg, 20 mg, Oral, Daily, Hawa Marsh MD, 20 mg at 01/24/24 0807    insulin glargine (LANTUS) subcutaneous injection 30 Units 0.3 mL, 30 Units, Subcutaneous, HS, Hawa Marsh MD, 30 Units at 01/23/24 2032    insulin lispro (HumaLOG) 100 units/mL subcutaneous injection 1-5 Units, 1-5 Units, Subcutaneous, TID AC, 1 Units at 01/24/24 0806 **AND** Fingerstick Glucose (POCT), , , TID AC, Hawa Marsh MD    insulin lispro (HumaLOG) 100 units/mL subcutaneous injection 1-5 Units, 1-5 Units, Subcutaneous, HS, Hawa Marsh MD, 3 Units at 01/23/24 2031    insulin lispro (HumaLOG) 100 units/mL subcutaneous injection 8 Units, 8 Units, Subcutaneous, TID With Meals, Hawa Marsh MD, 8 Units at 01/24/24 0806    loratadine (CLARITIN) tablet 10 mg, 10 mg, Oral, Daily, Hawa Marsh MD, 10 mg at 01/24/24 0807    metoprolol succinate (TOPROL-XL) 24 hr tablet 100 mg, 100 mg, Oral, Daily, Hawa Marsh MD, 100 mg at 01/24/24 0807    montelukast (SINGULAIR) tablet 10 mg, 10 mg, Oral, HS, Hawa Marsh MD, 10 mg at 01/23/24 2032    pantoprazole (PROTONIX) EC tablet 40 mg, 40 mg, Oral, Daily Before Breakfast, Hawa Marsh MD, 40 mg at 01/24/24 0625    potassium chloride (K-DUR,KLOR-CON) CR tablet 40 mEq, 40 mEq, Oral, Daily, Hawa Marsh MD, 40 mEq at 01/24/24 0807    traZODone (DESYREL) tablet 150 mg, 150 mg, Oral, HS, Hawa Marsh MD, 150 mg at 01/23/24 2032    Facility-Administered Medications Ordered in Other Encounters:     [MAR Hold] EPINEPHrine PF (ADRENALIN) 1 mg/mL injection 0.3 mg, 0.3 mg, Intramuscular, Q15 Min PRN, Elpidio Salamanca MD      Labs/Data:        Results from last 7 days   Lab Units 01/24/24  0458 01/23/24  0504 01/22/24  1539 01/22/24  1534   WBC Thousand/uL 13.32* 8.28  --  12.75*   HEMOGLOBIN g/dL 9.4* 9.2*  --  10.0*   I STAT HEMOGLOBIN g/dl  --   --  12.2   --    HEMATOCRIT % 33.9* 31.9*  --  35.1   HEMATOCRIT, ISTAT %  --   --  36  --    PLATELETS Thousands/uL 315 270  --  320     Results from last 7 days   Lab Units 01/24/24  0458 01/23/24  0504 01/22/24  1539 01/22/24  1534   POTASSIUM mmol/L 3.6 3.8  --  3.3*   CHLORIDE mmol/L 91* 93*  --  92*   CO2 mmol/L 39* 35*  --  36*   CO2, I-STAT mmol/L  --   --  39*  --    BUN mg/dL 19 17  --  17   CREATININE mg/dL 0.72 0.75  --  1.32*

## 2024-01-24 NOTE — ASSESSMENT & PLAN NOTE
Recent Labs     01/22/24  1534 01/23/24  0504 01/24/24  0458   CREATININE 1.32* 0.75 0.72   EGFR 42 83 88       Estimated Creatinine Clearance: 103.1 mL/min (by C-G formula based on SCr of 0.72 mg/dL).     Baseline creatinine-0.5-0.7  Etiology-prerenal azotemia in setting of volume overload/acute diastolic heart failure  Continue with IV Bumex  Urinary retention protocol  Avoid nephrotoxic agents  Monitor BMP daily

## 2024-01-24 NOTE — ASSESSMENT & PLAN NOTE
Recent Labs     01/23/24  0504 01/24/24  0458 01/25/24  0222   CREATININE 0.75 0.72 0.76   EGFR 83 88 82     Estimated Creatinine Clearance: 97.6 mL/min (by C-G formula based on SCr of 0.76 mg/dL).     Baseline creatinine-0.5-0.7  Etiology-prerenal azotemia in setting of volume overload/acute diastolic heart failure  Continue with IV Bumex  Creatinine stable  Urinary retention protocol  Avoid nephrotoxic agents  Monitor BMP daily

## 2024-01-24 NOTE — ASSESSMENT & PLAN NOTE
Lab Results   Component Value Date    HGBA1C 8.5 (H) 01/22/2024       Recent Labs     01/23/24  1621 01/23/24 2025 01/24/24  0721 01/24/24  1114   POCGLU 398* 328* 209* 258*         Blood Sugar Average: Last 72 hrs:  (P) 280.9895868562171965  Currently uncontrolled, as per recent A1c  Home regimen consists of glargine 30 units at bedtime, insulin lispro 30 units with breakfast, 15 units with lunch and dinner, metformin 500 mg twice daily and Ozempic weekly  Continue with insulin glargine 30 units at bedtime  Increase humalog to 10 units TID on 1/25  Additional coverage with sliding scale  Adjust regimen as needed  Carb consistent diet  Frequent Accu-Cheks and hypoglycemia protocol

## 2024-01-24 NOTE — ASSESSMENT & PLAN NOTE
Uses CPAP at home  Follows with pulmonology on outpatient, awaiting BiPAP study with transcutaneous oxygen monitoring  Continue with BiPAP while inpatient

## 2024-01-24 NOTE — ASSESSMENT & PLAN NOTE
Wt Readings from Last 3 Encounters:   01/24/24 131 kg (289 lb 1.6 oz)   11/09/23 129 kg (284 lb 12.8 oz)   09/20/23 128 kg (283 lb)     Appears volume overloaded  Worsening shortness of breath with bilateral lower extremity edema  Chest x-ray-pulmonary venous congestion  BNP-174, in obese patient  Echo 08/23-preserved EF, diastolic dysfunction, right ventricular systolic pressure 61  Home regimen-Bumex 2 mg twice daily  Continue with IV Bumex 4 mg twice daily  Fluid restriction 1500 mL  Salt restriction  Strict I's and O's  Appreciate Cardiology input

## 2024-01-24 NOTE — ASSESSMENT & PLAN NOTE
Home regimen consists of benralizumab, Singulair, Pulmicort nebulization twice daily, Xopenex and albuterol as needed, Bevespi twice daily  Was seen at pulmonology office on 1/22, had respiratory distress, was given prednisone taper and urged to go to ED for evaluation  Status post IV Solu-Medrol 125 mg in ED  IV Solu-Medrol 40mg discontinued  Respiratory protocol  Continue with Pulmicort nebulization twice daily  Albuterol and Xopenex as needed  Singulair 10 mg daily  Inpatient consult to Pulmonology -no further inpatient recommendations

## 2024-01-24 NOTE — CASE MANAGEMENT
Case Management Discharge Planning Note    Patient name Mattie Joy  Location /-01 MRN [id number]  : 1960 Date 2024       Current Admission Date: 2024  Current Admission Diagnosis:Acute on chronic hypercapnic respiratory failure (HCC)   Patient Active Problem List    Diagnosis Date Noted    IVELISSE (acute kidney injury) (HCC) 2024    Eosinophilic asthma 2023    COPD, severe with acute exacerbation 2023    Hepatic steatosis 2023    Morbid obesity (HCC) 02/15/2023    Diabetic polyneuropathy associated with type 2 diabetes mellitus (HCC) 11/15/2021    Type 2 diabetes mellitus with hyperglycemia (HCC) 2021    Tubular adenoma of colon 2021    Transaminitis 2021    Gastric polyp 2021    Class 3 severe obesity in adult (HCC) 2021    Pulmonary hypertension (HCC) 2020    Acute on chronic hypercapnic respiratory failure (HCC) 2020    Insomnia 10/28/2020    Abnormal CT of the chest 2020    Lactic acidosis 2020    Acute on chronic heart failure with preserved ejection fraction (HCC) 2020    Microcytic anemia 2020    Neck pain, chronic 2019    Current moderate episode of major depressive disorder without prior episode (HCC) 2019    Paroxysmal atrial fibrillation (HCC) 2018    Severe persistent asthma 2018    Abnormal computed tomography angiography (CTA) of abdomen 2018    Abnormal CT of the abdomen 2018    Iron deficiency anemia due to chronic blood loss 2018    Type 2 diabetes mellitus with stage 2 chronic kidney disease, with long-term current use of insulin (HCC)     Ganglion cyst of flexor tendon sheath 2017    Paresthesia of upper extremity 2017    Cervical radiculopathy 2017    Elevated liver enzymes 2015    Sexual dysfunction 2014    Chronic low back pain 10/15/2014    Hypercholesterolemia 2014    Lumbar radiculopathy  09/09/2014    Esophageal reflux 06/23/2014    Hypertensive heart and chronic kidney disease with heart failure and stage 1 through stage 4 chronic kidney disease, or chronic kidney disease (HCC) 03/24/2014    Obstructive sleep apnea 03/09/2014      LOS (days): 2  Geometric Mean LOS (GMLOS) (days): 3.9  Days to GMLOS:2.2     OBJECTIVE:  Risk of Unplanned Readmission Score: 38.65         Current admission status: Inpatient   Preferred Pharmacy:   [facility] [address]  [address] [address]  Phone: [phone] Fax: [phone]    [facility] - [place] - [address]  [address]  [address]  [place] [address]  Phone: [phone] Fax: [phone]    Primary Care Provider: Laith Olivares MD    Primary Insurance: MEDICARE  Secondary Insurance: AETNA    DISCHARGE DETAILS:    Requested Home Health Care         Is the patient interested in HHC at discharge?: Yes  Home Health Discipline requested:: Nursing  Home Health Agency Name:: [facility]  HHA External Referral Reason (only applicable if external HHA name selected): Patient has established relationship with provider  Home Health Follow-Up Provider:: PCP  Home Health Services Needed:: Evaluate Functional Status and Safety, Heart Failure Management, Oxygen Via Nasal Cannula  Homebound Criteria Met:: Requires the Assistance of Another Person for Safe Ambulation or to Leave the Home  Supporting Clincal Findings:: Limited Endurance

## 2024-01-25 LAB
ANION GAP SERPL CALCULATED.3IONS-SCNC: 6 MMOL/L
BASOPHILS # BLD AUTO: 0.05 THOUSANDS/ÂΜL (ref 0–0.1)
BASOPHILS NFR BLD AUTO: 0 % (ref 0–1)
BUN SERPL-MCNC: 18 MG/DL (ref 5–25)
CALCIUM SERPL-MCNC: 8.2 MG/DL (ref 8.4–10.2)
CHLORIDE SERPL-SCNC: 92 MMOL/L (ref 96–108)
CO2 SERPL-SCNC: 42 MMOL/L (ref 21–32)
CREAT SERPL-MCNC: 0.76 MG/DL (ref 0.6–1.3)
EOSINOPHIL # BLD AUTO: 0 THOUSAND/ÂΜL (ref 0–0.61)
EOSINOPHIL NFR BLD AUTO: 0 % (ref 0–6)
ERYTHROCYTE [DISTWIDTH] IN BLOOD BY AUTOMATED COUNT: 18.2 % (ref 11.6–15.1)
GFR SERPL CREATININE-BSD FRML MDRD: 82 ML/MIN/1.73SQ M
GLUCOSE SERPL-MCNC: 110 MG/DL (ref 65–140)
GLUCOSE SERPL-MCNC: 130 MG/DL (ref 65–140)
GLUCOSE SERPL-MCNC: 140 MG/DL (ref 65–140)
GLUCOSE SERPL-MCNC: 176 MG/DL (ref 65–140)
GLUCOSE SERPL-MCNC: 189 MG/DL (ref 65–140)
GLUCOSE SERPL-MCNC: 196 MG/DL (ref 65–140)
HCT VFR BLD AUTO: 34.7 % (ref 34.8–46.1)
HGB BLD-MCNC: 9.6 G/DL (ref 11.5–15.4)
IMM GRANULOCYTES # BLD AUTO: 0.04 THOUSAND/UL (ref 0–0.2)
IMM GRANULOCYTES NFR BLD AUTO: 0 % (ref 0–2)
LYMPHOCYTES # BLD AUTO: 1.97 THOUSANDS/ÂΜL (ref 0.6–4.47)
LYMPHOCYTES NFR BLD AUTO: 18 % (ref 14–44)
MCH RBC QN AUTO: 19.6 PG (ref 26.8–34.3)
MCHC RBC AUTO-ENTMCNC: 27.7 G/DL (ref 31.4–37.4)
MCV RBC AUTO: 71 FL (ref 82–98)
MONOCYTES # BLD AUTO: 0.88 THOUSAND/ÂΜL (ref 0.17–1.22)
MONOCYTES NFR BLD AUTO: 8 % (ref 4–12)
NEUTROPHILS # BLD AUTO: 8.22 THOUSANDS/ÂΜL (ref 1.85–7.62)
NEUTS SEG NFR BLD AUTO: 74 % (ref 43–75)
NRBC BLD AUTO-RTO: 0 /100 WBCS
PLATELET # BLD AUTO: 320 THOUSANDS/UL (ref 149–390)
PMV BLD AUTO: 10.8 FL (ref 8.9–12.7)
POTASSIUM SERPL-SCNC: 3.6 MMOL/L (ref 3.5–5.3)
RBC # BLD AUTO: 4.91 MILLION/UL (ref 3.81–5.12)
SODIUM SERPL-SCNC: 140 MMOL/L (ref 135–147)
WBC # BLD AUTO: 11.16 THOUSAND/UL (ref 4.31–10.16)

## 2024-01-25 PROCEDURE — 80048 BASIC METABOLIC PNL TOTAL CA: CPT | Performed by: INTERNAL MEDICINE

## 2024-01-25 PROCEDURE — 99232 SBSQ HOSP IP/OBS MODERATE 35: CPT | Performed by: INTERNAL MEDICINE

## 2024-01-25 PROCEDURE — 99232 SBSQ HOSP IP/OBS MODERATE 35: CPT | Performed by: PHYSICIAN ASSISTANT

## 2024-01-25 PROCEDURE — 82948 REAGENT STRIP/BLOOD GLUCOSE: CPT

## 2024-01-25 PROCEDURE — 94640 AIRWAY INHALATION TREATMENT: CPT

## 2024-01-25 PROCEDURE — 94760 N-INVAS EAR/PLS OXIMETRY 1: CPT

## 2024-01-25 PROCEDURE — 94660 CPAP INITIATION&MGMT: CPT

## 2024-01-25 PROCEDURE — 85025 COMPLETE CBC W/AUTO DIFF WBC: CPT | Performed by: INTERNAL MEDICINE

## 2024-01-25 RX ORDER — INSULIN LISPRO 100 [IU]/ML
10 INJECTION, SOLUTION INTRAVENOUS; SUBCUTANEOUS
Status: DISCONTINUED | OUTPATIENT
Start: 2024-01-25 | End: 2024-02-03

## 2024-01-25 RX ADMIN — PANTOPRAZOLE SODIUM 40 MG: 40 TABLET, DELAYED RELEASE ORAL at 02:28

## 2024-01-25 RX ADMIN — INSULIN LISPRO 10 UNITS: 100 INJECTION, SOLUTION INTRAVENOUS; SUBCUTANEOUS at 17:41

## 2024-01-25 RX ADMIN — DOCUSATE SODIUM 100 MG: 100 CAPSULE, LIQUID FILLED ORAL at 17:43

## 2024-01-25 RX ADMIN — BUDESONIDE 0.5 MG: 0.5 INHALANT ORAL at 07:45

## 2024-01-25 RX ADMIN — APIXABAN 5 MG: 5 TABLET, FILM COATED ORAL at 08:39

## 2024-01-25 RX ADMIN — BUMETANIDE 4 MG: 0.25 INJECTION INTRAMUSCULAR; INTRAVENOUS at 08:43

## 2024-01-25 RX ADMIN — DOCUSATE SODIUM 100 MG: 100 CAPSULE, LIQUID FILLED ORAL at 08:39

## 2024-01-25 RX ADMIN — MONTELUKAST 10 MG: 10 TABLET, FILM COATED ORAL at 20:10

## 2024-01-25 RX ADMIN — APIXABAN 5 MG: 5 TABLET, FILM COATED ORAL at 17:43

## 2024-01-25 RX ADMIN — INSULIN LISPRO 1 UNITS: 100 INJECTION, SOLUTION INTRAVENOUS; SUBCUTANEOUS at 20:10

## 2024-01-25 RX ADMIN — ESCITALOPRAM OXALATE 20 MG: 20 TABLET ORAL at 08:39

## 2024-01-25 RX ADMIN — INSULIN LISPRO 1 UNITS: 100 INJECTION, SOLUTION INTRAVENOUS; SUBCUTANEOUS at 17:42

## 2024-01-25 RX ADMIN — METOPROLOL SUCCINATE 100 MG: 50 TABLET, EXTENDED RELEASE ORAL at 08:39

## 2024-01-25 RX ADMIN — BUMETANIDE 4 MG: 0.25 INJECTION INTRAMUSCULAR; INTRAVENOUS at 17:44

## 2024-01-25 RX ADMIN — POTASSIUM CHLORIDE 40 MEQ: 1500 TABLET, EXTENDED RELEASE ORAL at 08:39

## 2024-01-25 RX ADMIN — ATORVASTATIN CALCIUM 40 MG: 40 TABLET, FILM COATED ORAL at 17:43

## 2024-01-25 RX ADMIN — BUDESONIDE 0.5 MG: 0.5 INHALANT ORAL at 20:08

## 2024-01-25 RX ADMIN — INSULIN LISPRO 8 UNITS: 100 INJECTION, SOLUTION INTRAVENOUS; SUBCUTANEOUS at 12:39

## 2024-01-25 RX ADMIN — TRAZODONE HYDROCHLORIDE 150 MG: 100 TABLET ORAL at 20:10

## 2024-01-25 RX ADMIN — INSULIN LISPRO 8 UNITS: 100 INJECTION, SOLUTION INTRAVENOUS; SUBCUTANEOUS at 08:36

## 2024-01-25 RX ADMIN — INSULIN GLARGINE 30 UNITS: 100 INJECTION, SOLUTION SUBCUTANEOUS at 20:11

## 2024-01-25 RX ADMIN — LORATADINE 10 MG: 10 TABLET ORAL at 08:40

## 2024-01-25 RX ADMIN — EMPAGLIFLOZIN 10 MG: 10 TABLET, FILM COATED ORAL at 08:39

## 2024-01-25 NOTE — PLAN OF CARE
Problem: DISCHARGE PLANNING  Goal: Discharge to home or other facility with appropriate resources  Description: INTERVENTIONS:  - Identify barriers to discharge w/patient and caregiver  - Arrange for needed discharge resources and transportation as appropriate  - Identify discharge learning needs (meds, wound care, etc.)  - Arrange for interpretive services to assist at discharge as needed  - Refer to Case Management Department for coordinating discharge planning if the patient needs post-hospital services based on physician/advanced practitioner order or complex needs related to functional status, cognitive ability, or social support system  Outcome: Progressing     Problem: Knowledge Deficit  Goal: Patient/family/caregiver demonstrates understanding of disease process, treatment plan, medications, and discharge instructions  Description: Complete learning assessment and assess knowledge base.  Interventions:  - Provide teaching at level of understanding  - Provide teaching via preferred learning methods  Outcome: Progressing     Problem: RESPIRATORY - ADULT  Goal: Achieves optimal ventilation and oxygenation  Description: INTERVENTIONS:  - Assess for changes in respiratory status  - Assess for changes in mentation and behavior  - Position to facilitate oxygenation and minimize respiratory effort  - Oxygen administered by appropriate delivery if ordered  - Initiate smoking cessation education as indicated  - Encourage broncho-pulmonary hygiene including cough, deep breathe, Incentive Spirometry  - Assess the need for suctioning and aspirate as needed  - Assess and instruct to report SOB or any respiratory difficulty  - Respiratory Therapy support as indicated  Outcome: Progressing

## 2024-01-25 NOTE — PROGRESS NOTES
Former[facility], [facility]  Progress Note  Name: Mattie Joy I  MRN: [id number]  Unit/Bed#: -01 I Date of Admission: 1/22/2024   Date of Service: 1/25/2024 I Hospital Day: 3    Assessment/Plan   * Acute on chronic hypercapnic respiratory failure (HCC)  Assessment & Plan  Baseline oxygen requirement 4 L continuously  Underlying severe COPD, severe pulmonary hypertension and diastolic heart failure  BNP-174  COVID/respiratory panel-negative  Chest x-ray consistent with pulmonary venous congestion, final reading pending  IV Solu-Medrol discontinued  Continue with IV Bumex 4 mg BID  Continue with telemetry  Appreciate Cardiology and Pulmonology recommendations  Respiratory protocol  Monitor volume status  Strict I's and O's  Fluid restriction  Now weaned to baseline of 4 L nasal cannula    IVELISSE (acute kidney injury) (HCC)  Assessment & Plan  Recent Labs     01/23/24  0504 01/24/24  0458 01/25/24  0222   CREATININE 0.75 0.72 0.76   EGFR 83 88 82     Estimated Creatinine Clearance: 97.6 mL/min (by C-G formula based on SCr of 0.76 mg/dL).     Baseline creatinine-0.5-0.7  Etiology-prerenal azotemia in setting of volume overload/acute diastolic heart failure  Continue with IV Bumex  Creatinine stable  Urinary retention protocol  Avoid nephrotoxic agents  Monitor BMP daily    COPD, severe with acute exacerbation  Assessment & Plan  Home regimen consists of benralizumab, Singulair, Pulmicort nebulization twice daily, Xopenex and albuterol as needed, Bevespi twice daily  Was seen at pulmonology office on 1/22, had respiratory distress, was given prednisone taper and urged to go to ED for evaluation  Status post IV Solu-Medrol 125 mg in ED  IV Solu-Medrol 40mg discontinued  Respiratory protocol  Continue with Pulmicort nebulization twice daily  Albuterol and Xopenex as needed  Singulair 10 mg daily  Inpatient consult to Pulmonology -no further inpatient recommendations      Eosinophilic asthma  Assessment &  Plan  On benralizumab every 8 weeks  Follows with Pulmonology    Morbid obesity (HCC)  Assessment & Plan  Counseling about adaptation of healthy dietary habits and lifestyle modifications, once medically stable  BMI >50  Affects all aspects of care    Pulmonary hypertension (HCC)  Assessment & Plan  As per recent echo in 08/23, right ventricular systolic pressure is severely elevated at 61 mmHg  Continue with IV Bumex  Continue to monitor volume status clinically    Acute on chronic heart failure with preserved ejection fraction (HCC)  Assessment & Plan  Wt Readings from Last 3 Encounters:   01/25/24 131 kg (288 lb 3.2 oz)   11/09/23 129 kg (284 lb 12.8 oz)   09/20/23 128 kg (283 lb)     Appears volume overloaded  Worsening shortness of breath with bilateral lower extremity edema  Chest x-ray-pulmonary venous congestion  BNP-174, in obese patient  Echo 08/23-preserved EF, diastolic dysfunction, right ventricular systolic pressure 61  Home regimen-Bumex 2 mg twice daily  Continue with IV Bumex 4 mg twice daily  Fluid restriction 1500 mL  Salt restriction  Strict I's and O's  Appreciate Cardiology input    Paroxysmal atrial fibrillation (HCC)  Assessment & Plan  Rate controlled with Toprol- mg daily  Anticoagulated with Eliquis 5 mg twice daily  Continue to monitor potassium and magnesium, and replenish to goal    Type 2 diabetes mellitus with stage 2 chronic kidney disease, with long-term current use of insulin ([facility])  Assessment & Plan  Lab Results   Component Value Date    HGBA1C 8.5 (H) 01/22/2024       Recent Labs     01/23/24  1621 01/23/24  2025 01/24/24  0721 01/24/24  1114   POCGLU 398* 328* 209* 258*         Blood Sugar Average: Last 72 hrs:  (P) [id number]  Currently uncontrolled, as per recent A1c  Home regimen consists of glargine 30 units at bedtime, insulin lispro 30 units with breakfast, 15 units with lunch and dinner, metformin 500 mg twice daily and Ozempic weekly  Continue with  insulin glargine 30 units at bedtime  Increase humalog to 10 units TID on 1/25  Additional coverage with sliding scale  Adjust regimen as needed  Carb consistent diet  Frequent Accu-Cheks and hypoglycemia protocol    Hypercholesterolemia  Assessment & Plan  Continue with atorvastatin 40 mg daily at bedtime    Obstructive sleep apnea  Assessment & Plan  Uses CPAP at home  Follows with pulmonology on outpatient, awaiting BiPAP study with transcutaneous oxygen monitoring  Continue with BiPAP while inpatient    Esophageal reflux  Assessment & Plan  Continue with Protonix         VTE Pharmacologic Prophylaxis: VTE Score: 8 High Risk (Score >/= 5) - Pharmacological DVT Prophylaxis Ordered: apixaban (Eliquis). Sequential Compression Devices Ordered.    Mobility:   Basic Mobility Inpatient Raw Score: 23  JH-HLM Goal: 7: Walk 25 feet or more  JH-HLM Achieved: 7: Walk 25 feet or more  HLM Goal achieved. Continue to encourage appropriate mobility.    Patient Centered Rounds: I performed bedside rounds with nursing staff today.   Discussions with Specialists or Other Care Team Provider: POLO appreciate cards input    Education and Discussions with Family / Patient: Patient declined call to .     Total Time Spent on Date of Encounter in care of patient: 40 mins. This time was spent on one or more of the following: performing physical exam; counseling and coordination of care; obtaining or reviewing history; documenting in the medical record; reviewing/ordering tests, medications or procedures; communicating with other healthcare professionals and discussing with patient's family/caregivers.    Current Length of Stay: 3 day(s)  Current Patient Status: Inpatient   Certification Statement: The patient will continue to require additional inpatient hospital stay due to IV diuresis  Discharge Plan: Anticipate discharge in 48 hrs to home.    Code Status: Level 1 - Full Code    Subjective:   Patient reports feeling much  improved since admission.  Denies chest pain/palpitations, shortness of breath improving.  Denies nausea/vomiting or abdominal pain.  Believes her lower extremity swelling is also improving.    Objective:     Vitals:   Temp (24hrs), Av.3 °F (36.8 °C), Min:98.1 °F (36.7 °C), Max:98.4 °F (36.9 °C)    Temp:  [98.1 °F (36.7 °C)-98.4 °F (36.9 °C)] 98.3 °F (36.8 °C)  HR:  [] 114  Resp:  [16-18] 18  BP: (128-136)/(77-84) 128/77  SpO2:  [89 %-95 %] 89 %  Body mass index is 51.05 kg/m².     Input and Output Summary (last 24 hours):     Intake/Output Summary (Last 24 hours) at 2024 1523  Last data filed at 2024 1446  Gross per 24 hour   Intake 1220 ml   Output 6800 ml   Net -5580 ml       Physical Exam:   Physical Exam  Vitals and nursing note reviewed.   Constitutional:       Appearance: Normal appearance.      Interventions: Nasal cannula in place.      Comments: No acute distress   HENT:      Head: Normocephalic.   Eyes:      General: No scleral icterus.     Extraocular Movements: Extraocular movements intact.      Conjunctiva/sclera: Conjunctivae normal.   Cardiovascular:      Rate and Rhythm: Normal rate. Rhythm irregularly irregular.      Heart sounds: S1 normal and S2 normal.   Pulmonary:      Breath sounds: Decreased breath sounds present. No wheezing or rhonchi.   Abdominal:      General: Bowel sounds are normal.      Palpations: Abdomen is soft.      Tenderness: There is no abdominal tenderness. There is no guarding or rebound.   Musculoskeletal:         General: No swelling, tenderness or deformity.      Cervical back: Normal range of motion.      Comments: Trace pedal edema.  Able to move upper/lower extremities bilaterally without difficulty   Skin:     General: Skin is warm and dry.   Neurological:      Mental Status: She is alert and oriented to person, place, and time.   Psychiatric:         Mood and Affect: Mood normal.         Speech: Speech normal.         Behavior: Behavior normal.           Additional Data:     Labs:  Results from last 7 days   Lab Units 01/25/24 0222   WBC Thousand/uL 11.16*   HEMOGLOBIN g/dL 9.6*   HEMATOCRIT % 34.7*   PLATELETS Thousands/uL 320   NEUTROS PCT % 74   LYMPHS PCT % 18   MONOS PCT % 8   EOS PCT % 0     Results from last 7 days   Lab Units 01/25/24  0222 01/22/24  1539 01/22/24  1534   SODIUM mmol/L 140   < > 140   POTASSIUM mmol/L 3.6   < > 3.3*   CHLORIDE mmol/L 92*   < > 92*   CO2 mmol/L 42*   < > 36*   CO2, I-STAT   --    < >  --    BUN mg/dL 18   < > 17   CREATININE mg/dL 0.76   < > 1.32*   ANION GAP mmol/L 6   < > 12   CALCIUM mg/dL 8.2*   < > 8.3*   ALBUMIN g/dL  --   --  4.1   TOTAL BILIRUBIN mg/dL  --   --  0.64   ALK PHOS U/L  --   --  140*   ALT U/L  --   --  22   AST U/L  --   --  22   GLUCOSE RANDOM mg/dL 110   < > 97    < > = values in this interval not displayed.         Results from last 7 days   Lab Units 01/25/24  1119 01/25/24  0715 01/24/24  2133 01/24/24  1618 01/24/24  1114 01/24/24  0721 01/23/24  2025 01/23/24  1621 01/23/24  1110 01/23/24  0722 01/22/24  2109 01/22/24  1838   POC GLUCOSE mg/dl 130 140 196* 161* 258* 209* 328* 398* 303* 297* 365* 292*     Results from last 7 days   Lab Units 01/22/24  1534   HEMOGLOBIN A1C % 8.5*           Lines/Drains:  Invasive Devices       Peripheral Intravenous Line  Duration             Peripheral IV 01/22/24 Proximal;Right;Ventral (anterior) Forearm 2 days                      Telemetry:  Telemetry Orders (From admission, onward)               24 Hour Telemetry Monitoring  Continuous x 24 Hours (Telem)        Question:  Reason for 24 Hour Telemetry  Answer:  Decompensated CHF- and any one of the following: continuous diuretic infusion or total diuretic dose >200 mg daily, associated electrolyte derangement (I.e. K < 3.0), ionotropic drip (continuous infusion), hx of ventricular arrhythmia, or new EF < 35%                     Telemetry Reviewed: Atrial fibrillation. HR averaging 100  Indication for  Continued Telemetry Use: Acute CHF on >200 mg lasix/day or equivalent dose or with new reduced EF.              Imaging: Reviewed radiology reports from this admission including: chest xray    Recent Cultures (last 7 days):         Last 24 Hours Medication List:   Current Facility-Administered Medications   Medication Dose Route Frequency Provider Last Rate    acetaminophen  650 mg Oral Q6H PRN Rosamaria Miller PA-C      albuterol  2 puff Inhalation Q4H PRN Hawa Marsh MD      apixaban  5 mg Oral BID Hawa Marsh MD      atorvastatin  40 mg Oral Daily With Dinner Hawa Marsh MD      budesonide  0.5 mg Nebulization Q12H Hawa Marsh MD      bumetanide  4 mg Intravenous BID Hawa Marsh MD      docusate sodium  100 mg Oral BID Hawa Marsh MD      Empagliflozin  10 mg Oral Daily Chuck Harrell PA-C      escitalopram  20 mg Oral Daily Hawa Marsh MD      insulin glargine  30 Units Subcutaneous HS Hawa Marsh MD      insulin lispro  1-5 Units Subcutaneous TID AC Hawa Marsh MD      insulin lispro  1-5 Units Subcutaneous HS Hawa Marsh MD      insulin lispro  10 Units Subcutaneous TID With Meals Rupal Mercedes PA-C      loratadine  10 mg Oral Daily Hawa Marsh MD      metoprolol succinate  100 mg Oral Daily Hawa Marsh MD      montelukast  10 mg Oral HS Hawa Marsh MD      pantoprazole  40 mg Oral Daily Before Breakfast Hawa Marsh MD      potassium chloride  40 mEq Oral Daily Hawa Marsh MD      traZODone  150 mg Oral HS Hawa Marsh MD          Today, Patient Was Seen By: Rupal Mercedes PA-C    **Please Note: This note may have been constructed using a voice recognition system.**

## 2024-01-25 NOTE — PROGRESS NOTES
Progress Note - Cardiology   Mattie Joy 63 y.o. female MRN: [id number]  Unit/Bed#: -01 Encounter: [id number]        Problem List:  Principal Problem:    Acute on chronic hypercapnic respiratory failure (HCC)  Active Problems:    Esophageal reflux    Obstructive sleep apnea    Hypercholesterolemia    Type 2 diabetes mellitus with stage 2 chronic kidney disease, with long-term current use of insulin (HCC)    Paroxysmal atrial fibrillation (HCC)    Pulmonary hypertension (HCC)    Morbid obesity (HCC)    Eosinophilic asthma    COPD, severe with acute exacerbation    IVELISSE (acute kidney injury) (HCC)    Acute on chronic heart failure with preserved ejection fraction (HCC)      Assessment:  Acute on chronic respiratory failure  Baseline oxygen requirements 4 L nasal cannula  Initially on admission patient noted to be hypoxic with saturations in the upper 80s while on 5 L nasal cannula, she has at maximum this admission thus far required 9-10 L but was able to wean down as of 1/23/2024  Etiology multifactorial in light of congestive heart failure as well as COPD exacerbation  Acute on chronic diastolic heart failure  8/15/2023 echo: LVEF 65%, grade 2 diastolic dysfunction, severely elevated RV systolic pressure 61 mmHg  Prehospital diuretic: Bumex 4 twice daily with 40 potassium daily  Prior weight as low as 278 in August 2023 at which time she was reportedly euvolemic  Paroxysmal atrial fibrillation  Patient in atrial fibrillation on arrival with rates in the low 100s  Thromboembolic PPx: Eliquis 5 twice daily  Rate control: Toprol- daily  COPD with acute exacerbation  Type 2 diabetes  Obesity, BMI 51  Pulmonary hypertension likely group 2/3 from left heart disease, underlying lung disease, OHV  Acute kidney injury, resolved     -Standing weight 288 pounds today  -I/O indicate net -6.6 L  -Renal function is stable but CO2 is gradually increasing  -Hopeful to transition to oral diuretic in the next 24-48  hours    Plan/ Discussion:  Continue IV Bumex  Hopeful transition to oral diuretic in 24-48 hours  Continue remainder of cardiac medications as ordered    Subjective:  Breathing feels overall better but still getting short of breath with exertion    Vitals:  Vitals:    01/24/24 0502 01/25/24 0226   Weight: 131 kg (289 lb 1.6 oz) 131 kg (288 lb 3.2 oz)   ,  Vitals:    01/24/24 2132 01/24/24 2240 01/25/24 0226 01/25/24 0716   BP:    129/78   BP Location:       Pulse:  91  102   Resp:    18   Temp:    98.4 °F (36.9 °C)   TempSrc:       SpO2: 95%   93%   Weight:   131 kg (288 lb 3.2 oz)    Height:           Exam:  General: Alert awake and oriented, no acute distress  Heart:  Irreg, tachycardic, no murmurs, Normal S1, no edema    Respiratory effort/ Lungs:  Breathing comfortably on 4L NC, clear bilaterally without wheezing, rales, crackles   Abdominal: Non-tender to palpation, + bowel sounds, soft, no masses or distension  Lower Limbs:  No edema            Telemetry:       Atrial fibrillation, Heart Rate 100    Medications:    Current Facility-Administered Medications:     acetaminophen (TYLENOL) tablet 650 mg, 650 mg, Oral, Q6H PRN, Rosamaria Miller PA-C, 650 mg at 01/22/24 2127    albuterol (PROVENTIL HFA,VENTOLIN HFA) inhaler 2 puff, 2 puff, Inhalation, Q4H PRN, Hawa Marsh MD, 2 puff at 01/23/24 1520    apixaban (ELIQUIS) tablet 5 mg, 5 mg, Oral, BID, Hawa Marsh MD, 5 mg at 01/25/24 0839    atorvastatin (LIPITOR) tablet 40 mg, 40 mg, Oral, Daily With Dinner, Hawa Marsh MD, 40 mg at 01/24/24 1723    budesonide (PULMICORT) inhalation solution 0.5 mg, 0.5 mg, Nebulization, Q12H, Hawa Marsh MD, 0.5 mg at 01/25/24 0745    bumetanide (BUMEX) injection 4 mg, 4 mg, Intravenous, BID, Hawa Marsh MD, 4 mg at 01/25/24 0843    docusate sodium (COLACE) capsule 100 mg, 100 mg, Oral, BID, Hawa Marsh MD, 100 mg at 01/25/24 0839    Empagliflozin (JARDIANCE) tablet 10 mg, 10 mg, Oral, Daily, Chuck Harrell PA-C, 10 mg at 01/25/24  0839    escitalopram (LEXAPRO) tablet 20 mg, 20 mg, Oral, Daily, Hawa Marsh MD, 20 mg at 01/25/24 0839    insulin glargine (LANTUS) subcutaneous injection 30 Units 0.3 mL, 30 Units, Subcutaneous, HS, Hawa Marsh MD, 30 Units at 01/24/24 2141    insulin lispro (HumaLOG) 100 units/mL subcutaneous injection 1-5 Units, 1-5 Units, Subcutaneous, TID AC, 1 Units at 01/24/24 1730 **AND** Fingerstick Glucose (POCT), , , TID AC, Hawa Marsh MD    insulin lispro (HumaLOG) 100 units/mL subcutaneous injection 1-5 Units, 1-5 Units, Subcutaneous, HS, Hawa Marsh MD, 1 Units at 01/24/24 2140    insulin lispro (HumaLOG) 100 units/mL subcutaneous injection 8 Units, 8 Units, Subcutaneous, TID With Meals, Hawa Marsh MD, 8 Units at 01/25/24 0836    loratadine (CLARITIN) tablet 10 mg, 10 mg, Oral, Daily, Hawa Marsh MD, 10 mg at 01/25/24 0840    metoprolol succinate (TOPROL-XL) 24 hr tablet 100 mg, 100 mg, Oral, Daily, Hawa Marsh MD, 100 mg at 01/25/24 0839    montelukast (SINGULAIR) tablet 10 mg, 10 mg, Oral, HS, Hawa Marsh MD, 10 mg at 01/24/24 2141    pantoprazole (PROTONIX) EC tablet 40 mg, 40 mg, Oral, Daily Before Breakfast, Hawa Marsh MD, 40 mg at 01/25/24 0228    potassium chloride (K-DUR,KLOR-CON) CR tablet 40 mEq, 40 mEq, Oral, Daily, Hawa Marsh MD, 40 mEq at 01/25/24 0839    traZODone (DESYREL) tablet 150 mg, 150 mg, Oral, HS, Hawa Marsh MD, 150 mg at 01/24/24 2141      Labs/Data:        Results from last 7 days   Lab Units 01/25/24  0222 01/24/24  0458 01/23/24  0504   WBC Thousand/uL 11.16* 13.32* 8.28   HEMOGLOBIN g/dL 9.6* 9.4* 9.2*   HEMATOCRIT % 34.7* 33.9* 31.9*   PLATELETS Thousands/uL 320 315 270     Results from last 7 days   Lab Units 01/25/24  0222 01/24/24  0458 01/23/24  0504   POTASSIUM mmol/L 3.6 3.6 3.8   CHLORIDE mmol/L 92* 91* 93*   CO2 mmol/L 42* 39* 35*   BUN mg/dL 18 19 17   CREATININE mg/dL 0.76 0.72 0.75

## 2024-01-25 NOTE — ASSESSMENT & PLAN NOTE
Wt Readings from Last 3 Encounters:   01/25/24 131 kg (288 lb 3.2 oz)   11/09/23 129 kg (284 lb 12.8 oz)   09/20/23 128 kg (283 lb)     Appears volume overloaded  Worsening shortness of breath with bilateral lower extremity edema  Chest x-ray-pulmonary venous congestion  BNP-174, in obese patient  Echo 08/23-preserved EF, diastolic dysfunction, right ventricular systolic pressure 61  Home regimen-Bumex 2 mg twice daily  Continue with IV Bumex 4 mg twice daily  Fluid restriction 1500 mL  Salt restriction  Strict I's and O's  Appreciate Cardiology input

## 2024-01-25 NOTE — ASSESSMENT & PLAN NOTE
Baseline oxygen requirement 4 L continuously  Underlying severe COPD, severe pulmonary hypertension and diastolic heart failure  BNP-174  COVID/respiratory panel-negative  Chest x-ray consistent with pulmonary venous congestion, final reading pending  IV Solu-Medrol discontinued  Continue with IV Bumex 4 mg BID  Continue with telemetry  Appreciate Cardiology and Pulmonology recommendations  Respiratory protocol  Monitor volume status  Strict I's and O's  Fluid restriction  Now weaned to baseline of 4 L nasal cannula

## 2024-01-26 LAB
ANION GAP SERPL CALCULATED.3IONS-SCNC: 6 MMOL/L
BASOPHILS # BLD AUTO: 0.03 THOUSANDS/ÂΜL (ref 0–0.1)
BASOPHILS NFR BLD AUTO: 0 % (ref 0–1)
BUN SERPL-MCNC: 18 MG/DL (ref 5–25)
CALCIUM SERPL-MCNC: 8.2 MG/DL (ref 8.4–10.2)
CHLORIDE SERPL-SCNC: 92 MMOL/L (ref 96–108)
CO2 SERPL-SCNC: 39 MMOL/L (ref 21–32)
CREAT SERPL-MCNC: 0.63 MG/DL (ref 0.6–1.3)
EOSINOPHIL # BLD AUTO: 0 THOUSAND/ÂΜL (ref 0–0.61)
EOSINOPHIL NFR BLD AUTO: 0 % (ref 0–6)
ERYTHROCYTE [DISTWIDTH] IN BLOOD BY AUTOMATED COUNT: 17.9 % (ref 11.6–15.1)
GFR SERPL CREATININE-BSD FRML MDRD: 94 ML/MIN/1.73SQ M
GLUCOSE SERPL-MCNC: 123 MG/DL (ref 65–140)
GLUCOSE SERPL-MCNC: 134 MG/DL (ref 65–140)
GLUCOSE SERPL-MCNC: 180 MG/DL (ref 65–140)
GLUCOSE SERPL-MCNC: 208 MG/DL (ref 65–140)
GLUCOSE SERPL-MCNC: 209 MG/DL (ref 65–140)
HCT VFR BLD AUTO: 32.4 % (ref 34.8–46.1)
HGB BLD-MCNC: 9.1 G/DL (ref 11.5–15.4)
IMM GRANULOCYTES # BLD AUTO: 0.07 THOUSAND/UL (ref 0–0.2)
IMM GRANULOCYTES NFR BLD AUTO: 1 % (ref 0–2)
LYMPHOCYTES # BLD AUTO: 1.75 THOUSANDS/ÂΜL (ref 0.6–4.47)
LYMPHOCYTES NFR BLD AUTO: 17 % (ref 14–44)
MAGNESIUM SERPL-MCNC: 2.7 MG/DL (ref 1.9–2.7)
MCH RBC QN AUTO: 19.8 PG (ref 26.8–34.3)
MCHC RBC AUTO-ENTMCNC: 28.1 G/DL (ref 31.4–37.4)
MCV RBC AUTO: 70 FL (ref 82–98)
MONOCYTES # BLD AUTO: 0.97 THOUSAND/ÂΜL (ref 0.17–1.22)
MONOCYTES NFR BLD AUTO: 10 % (ref 4–12)
NEUTROPHILS # BLD AUTO: 7.37 THOUSANDS/ÂΜL (ref 1.85–7.62)
NEUTS SEG NFR BLD AUTO: 72 % (ref 43–75)
NRBC BLD AUTO-RTO: 0 /100 WBCS
PLATELET # BLD AUTO: 298 THOUSANDS/UL (ref 149–390)
PMV BLD AUTO: 11.2 FL (ref 8.9–12.7)
POTASSIUM SERPL-SCNC: 3.9 MMOL/L (ref 3.5–5.3)
RBC # BLD AUTO: 4.6 MILLION/UL (ref 3.81–5.12)
SODIUM SERPL-SCNC: 137 MMOL/L (ref 135–147)
WBC # BLD AUTO: 10.19 THOUSAND/UL (ref 4.31–10.16)

## 2024-01-26 PROCEDURE — 80048 BASIC METABOLIC PNL TOTAL CA: CPT | Performed by: INTERNAL MEDICINE

## 2024-01-26 PROCEDURE — 99232 SBSQ HOSP IP/OBS MODERATE 35: CPT | Performed by: PHYSICIAN ASSISTANT

## 2024-01-26 PROCEDURE — 83735 ASSAY OF MAGNESIUM: CPT | Performed by: INTERNAL MEDICINE

## 2024-01-26 PROCEDURE — 94660 CPAP INITIATION&MGMT: CPT

## 2024-01-26 PROCEDURE — 94760 N-INVAS EAR/PLS OXIMETRY 1: CPT

## 2024-01-26 PROCEDURE — 99232 SBSQ HOSP IP/OBS MODERATE 35: CPT | Performed by: INTERNAL MEDICINE

## 2024-01-26 PROCEDURE — 94640 AIRWAY INHALATION TREATMENT: CPT

## 2024-01-26 PROCEDURE — 94003 VENT MGMT INPAT SUBQ DAY: CPT

## 2024-01-26 PROCEDURE — 85025 COMPLETE CBC W/AUTO DIFF WBC: CPT | Performed by: INTERNAL MEDICINE

## 2024-01-26 PROCEDURE — 82948 REAGENT STRIP/BLOOD GLUCOSE: CPT

## 2024-01-26 PROCEDURE — 5A09557 ASSISTANCE WITH RESPIRATORY VENTILATION, GREATER THAN 96 CONSECUTIVE HOURS, CONTINUOUS POSITIVE AIRWAY PRESSURE: ICD-10-PCS | Performed by: INTERNAL MEDICINE

## 2024-01-26 RX ADMIN — APIXABAN 5 MG: 5 TABLET, FILM COATED ORAL at 17:55

## 2024-01-26 RX ADMIN — INSULIN LISPRO 10 UNITS: 100 INJECTION, SOLUTION INTRAVENOUS; SUBCUTANEOUS at 11:56

## 2024-01-26 RX ADMIN — INSULIN GLARGINE 30 UNITS: 100 INJECTION, SOLUTION SUBCUTANEOUS at 21:13

## 2024-01-26 RX ADMIN — ACETAMINOPHEN 325MG 650 MG: 325 TABLET ORAL at 07:19

## 2024-01-26 RX ADMIN — BUMETANIDE 4 MG: 0.25 INJECTION INTRAMUSCULAR; INTRAVENOUS at 17:55

## 2024-01-26 RX ADMIN — BUMETANIDE 4 MG: 0.25 INJECTION INTRAMUSCULAR; INTRAVENOUS at 08:32

## 2024-01-26 RX ADMIN — LORATADINE 10 MG: 10 TABLET ORAL at 08:36

## 2024-01-26 RX ADMIN — ATORVASTATIN CALCIUM 40 MG: 40 TABLET, FILM COATED ORAL at 17:55

## 2024-01-26 RX ADMIN — INSULIN LISPRO 1 UNITS: 100 INJECTION, SOLUTION INTRAVENOUS; SUBCUTANEOUS at 17:54

## 2024-01-26 RX ADMIN — TRAZODONE HYDROCHLORIDE 150 MG: 100 TABLET ORAL at 21:13

## 2024-01-26 RX ADMIN — METOPROLOL SUCCINATE 100 MG: 50 TABLET, EXTENDED RELEASE ORAL at 08:35

## 2024-01-26 RX ADMIN — MONTELUKAST 10 MG: 10 TABLET, FILM COATED ORAL at 21:13

## 2024-01-26 RX ADMIN — INSULIN LISPRO 1 UNITS: 100 INJECTION, SOLUTION INTRAVENOUS; SUBCUTANEOUS at 11:56

## 2024-01-26 RX ADMIN — BUDESONIDE 0.5 MG: 0.5 INHALANT ORAL at 07:34

## 2024-01-26 RX ADMIN — ESCITALOPRAM OXALATE 20 MG: 20 TABLET ORAL at 08:36

## 2024-01-26 RX ADMIN — INSULIN LISPRO 1 UNITS: 100 INJECTION, SOLUTION INTRAVENOUS; SUBCUTANEOUS at 21:20

## 2024-01-26 RX ADMIN — INSULIN LISPRO 10 UNITS: 100 INJECTION, SOLUTION INTRAVENOUS; SUBCUTANEOUS at 08:32

## 2024-01-26 RX ADMIN — DOCUSATE SODIUM 100 MG: 100 CAPSULE, LIQUID FILLED ORAL at 08:35

## 2024-01-26 RX ADMIN — EMPAGLIFLOZIN 10 MG: 10 TABLET, FILM COATED ORAL at 08:36

## 2024-01-26 RX ADMIN — PANTOPRAZOLE SODIUM 40 MG: 40 TABLET, DELAYED RELEASE ORAL at 03:45

## 2024-01-26 RX ADMIN — POTASSIUM CHLORIDE 40 MEQ: 1500 TABLET, EXTENDED RELEASE ORAL at 08:35

## 2024-01-26 RX ADMIN — BUDESONIDE 0.5 MG: 0.5 INHALANT ORAL at 20:59

## 2024-01-26 RX ADMIN — DOCUSATE SODIUM 100 MG: 100 CAPSULE, LIQUID FILLED ORAL at 17:55

## 2024-01-26 RX ADMIN — INSULIN LISPRO 10 UNITS: 100 INJECTION, SOLUTION INTRAVENOUS; SUBCUTANEOUS at 17:53

## 2024-01-26 RX ADMIN — APIXABAN 5 MG: 5 TABLET, FILM COATED ORAL at 08:36

## 2024-01-26 NOTE — PROGRESS NOTES
Progress Note - Cardiology   Mattie Joy 63 y.o. female MRN: [id number]  Unit/Bed#: -01 Encounter: [id number]        Problem List:  Principal Problem:    Acute on chronic hypercapnic respiratory failure (HCC)  Active Problems:    Esophageal reflux    Obstructive sleep apnea    Hypercholesterolemia    Type 2 diabetes mellitus with stage 2 chronic kidney disease, with long-term current use of insulin (HCC)    Paroxysmal atrial fibrillation (HCC)    Pulmonary hypertension (HCC)    Morbid obesity (HCC)    Eosinophilic asthma    COPD, severe with acute exacerbation    IVELISSE (acute kidney injury) (HCC)    Acute on chronic heart failure with preserved ejection fraction (HCC)      Assessment:  Chronic respiratory failure on 4 L nasal cannula at baseline  Acute on chronic diastolic heart failure  8/15/2023 echo: LVEF 65%, grade 2 diastolic dysfunction, severely elevated RV systolic pressure 61 mmHg  Prehospital diuretic: Bumex 4 twice daily with 40 potassium daily  Prior weight as low as 278 in August 2023 at which time she was reportedly euvolemic  Paroxysmal atrial fibrillation  Patient in atrial fibrillation on arrival with rates in the low 100s  Thromboembolic PPx: Eliquis 5 twice daily  Rate control: Toprol- daily  COPD with acute exacerbation  Type 2 diabetes  Obesity, BMI 51  Pulmonary hypertension likely group 2/3 from left heart disease, underlying lung disease, OHV  Acute kidney injury, resolved     -She is diuresing well and weight is down to 284 pounds today  -She is net -9.2 L  -Renal function and electrolytes are stable, creatinine actually is improving  -Physical exam very difficult to  body habitus  -We will continue aggressive diuresis to remove as much fluid as possible this admission    Plan/ Discussion:  Continue IV Bumex 4 twice daily  Continue Eliquis and metoprolol for atrial fibrillation  Continue Jardiance  Continue potassium supplements  Continue aggressive diuresis to remove as much  fluid as possible this admission. Her volume status is very difficult to  by physical exam. Lab work indicates improving renal function and stable electrolytes. She is down 9 L. I do anticipate that she could diurese through the weekend. As long as her creatinine and electrolytes are stable would continue IV Bumex at present dose. Unless any acute issues over the weekend we will see back formally on Monday, 1/29/2024. Call if any issues in the mean time.     Subjective:  Feeling okay today but still getting short of breath walking to and from the bathroom    Vitals:  Vitals:    01/25/24 0226 01/26/24 0344   Weight: 131 kg (288 lb 3.2 oz) 129 kg (284 lb 6.4 oz)   ,  Vitals:    01/26/24 0729 01/26/24 0834 01/26/24 0918 01/26/24 1020   BP: 108/66 98/72 98/69 116/83   Pulse: 85 (!) 108 (!) 106 (!) 112   Resp: 21  21    Temp: (!) 97.2 °F (36.2 °C)      TempSrc:       SpO2: 100% 90% 98% 92%   Weight:       Height:           Exam:  General: Alert awake and oriented, no acute distress  Heart:  Regular rate and rhythm, no murmurs, Normal S1, no edema    Respiratory effort/ Lungs:  Breathing comfortably on 4 L nasal cannula, clear bilaterally without wheezing, rales, crackles   Abdominal: Non-tender to palpation, + bowel sounds, soft, no masses or distension  Lower Limbs:  No edema            Telemetry:           Medications:    Current Facility-Administered Medications:     acetaminophen (TYLENOL) tablet 650 mg, 650 mg, Oral, Q6H PRN, Rosamaria Miller PA-C, 650 mg at 01/26/24 0719    albuterol (PROVENTIL HFA,VENTOLIN HFA) inhaler 2 puff, 2 puff, Inhalation, Q4H PRN, Hawa Marsh MD, 2 puff at 01/23/24 1520    apixaban (ELIQUIS) tablet 5 mg, 5 mg, Oral, BID, Hawa Marsh MD, 5 mg at 01/26/24 0836    atorvastatin (LIPITOR) tablet 40 mg, 40 mg, Oral, Daily With Dinner, Hawa Marsh MD, 40 mg at 01/25/24 1743    budesonide (PULMICORT) inhalation solution 0.5 mg, 0.5 mg, Nebulization, Q12H, Hawa Marsh MD, 0.5 mg at 01/26/24 0756     bumetanide (BUMEX) injection 4 mg, 4 mg, Intravenous, BID, Hawa Marsh MD, 4 mg at 01/26/24 0832    docusate sodium (COLACE) capsule 100 mg, 100 mg, Oral, BID, Hawa Marsh MD, 100 mg at 01/26/24 0835    Empagliflozin (JARDIANCE) tablet 10 mg, 10 mg, Oral, Daily, Chuck Harrell PA-C, 10 mg at 01/26/24 0836    escitalopram (LEXAPRO) tablet 20 mg, 20 mg, Oral, Daily, Hawa Marsh MD, 20 mg at 01/26/24 0836    insulin glargine (LANTUS) subcutaneous injection 30 Units 0.3 mL, 30 Units, Subcutaneous, HS, Hawa Marsh MD, 30 Units at 01/25/24 2011    insulin lispro (HumaLOG) 100 units/mL subcutaneous injection 1-5 Units, 1-5 Units, Subcutaneous, TID AC, 1 Units at 01/26/24 1156 **AND** Fingerstick Glucose (POCT), , , TID AC, Hawa Marsh MD    insulin lispro (HumaLOG) 100 units/mL subcutaneous injection 1-5 Units, 1-5 Units, Subcutaneous, HS, Hawa Marsh MD, 1 Units at 01/25/24 2010    insulin lispro (HumaLOG) 100 units/mL subcutaneous injection 10 Units, 10 Units, Subcutaneous, TID With Meals, Rupal Mercedes PA-C, 10 Units at 01/26/24 1156    loratadine (CLARITIN) tablet 10 mg, 10 mg, Oral, Daily, Hawa Marsh MD, 10 mg at 01/26/24 0836    metoprolol succinate (TOPROL-XL) 24 hr tablet 100 mg, 100 mg, Oral, Daily, Hawa Marsh MD, 100 mg at 01/26/24 0835    montelukast (SINGULAIR) tablet 10 mg, 10 mg, Oral, HS, Hawa Marsh MD, 10 mg at 01/25/24 2010    pantoprazole (PROTONIX) EC tablet 40 mg, 40 mg, Oral, Daily Before Breakfast, Hawa Marsh MD, 40 mg at 01/26/24 0345    potassium chloride (K-DUR,KLOR-CON) CR tablet 40 mEq, 40 mEq, Oral, Daily, Hawa Marsh MD, 40 mEq at 01/26/24 0835    traZODone (DESYREL) tablet 150 mg, 150 mg, Oral, HS, Hawa Marsh MD, 150 mg at 01/25/24 2010      Labs/Data:        Results from last 7 days   Lab Units 01/26/24  0410 01/25/24  0222 01/24/24  0458   WBC Thousand/uL 10.19* 11.16* 13.32*   HEMOGLOBIN g/dL 9.1* 9.6* 9.4*   HEMATOCRIT % 32.4* 34.7* 33.9*   PLATELETS Thousands/uL 298 320 315      Results from last 7 days   Lab Units 01/26/24  0410 01/25/24  0222 01/24/24  0458   POTASSIUM mmol/L 3.9 3.6 3.6   CHLORIDE mmol/L 92* 92* 91*   CO2 mmol/L 39* 42* 39*   BUN mg/dL 18 18 19   CREATININE mg/dL 0.63 0.76 0.72

## 2024-01-26 NOTE — PROGRESS NOTES
[facility]  Progress Note  Name: Mattie Joy I  MRN: [id number]  Unit/Bed#: -01 I Date of Admission: 1/22/2024   Date of Service: 1/26/2024 I Hospital Day: 4    Assessment/Plan   * Acute on chronic hypercapnic respiratory failure (HCC)  Assessment & Plan  Baseline oxygen requirement 4 L continuously  Underlying severe COPD, severe pulmonary hypertension and diastolic heart failure  BNP-174  COVID/respiratory panel-negative  Chest x-ray consistent with pulmonary venous congestion, final reading pending  IV Solu-Medrol discontinued  Continue with IV Bumex 4 mg BID  Continue with telemetry  Appreciate Cardiology and Pulmonology recommendations  Respiratory protocol  Monitor volume status  Strict I's and O's  Fluid restriction  Now weaned to baseline of 4 L nasal cannula    IVELISSE (acute kidney injury) (HCC)  Assessment & Plan  Recent Labs     01/24/24  0458 01/25/24  0222 01/26/24  0410   CREATININE 0.72 0.76 0.63   EGFR 88 82 94       Estimated Creatinine Clearance: 116.7 mL/min (by C-G formula based on SCr of 0.63 mg/dL).     Baseline creatinine-0.5-0.7  Etiology-prerenal azotemia in setting of volume overload/acute diastolic heart failure  Continue with IV Bumex  Creatinine stable  Urinary retention protocol  Avoid nephrotoxic agents  Monitor BMP daily    COPD, severe with acute exacerbation  Assessment & Plan  Home regimen consists of benralizumab, Singulair, Pulmicort nebulization twice daily, Xopenex and albuterol as needed, Bevespi twice daily  Was seen at pulmonology office on 1/22, had respiratory distress, was given prednisone taper and urged to go to ED for evaluation  Status post IV Solu-Medrol 125 mg in ED  IV Solu-Medrol 40mg discontinued  Respiratory protocol  Continue with Pulmicort nebulization twice daily  Albuterol and Xopenex as needed  Singulair 10 mg daily  Inpatient consult to Pulmonology -no further inpatient recommendations      Eosinophilic asthma  Assessment  & Plan  On benralizumab every 8 weeks  Follows with Pulmonology    Morbid obesity (HCC)  Assessment & Plan  Counseling about adaptation of healthy dietary habits and lifestyle modifications, once medically stable  BMI >50  Affects all aspects of care    Pulmonary hypertension (HCC)  Assessment & Plan  As per recent echo in 08/23, right ventricular systolic pressure is severely elevated at 61 mmHg  Continue with IV Bumex  Continue to monitor volume status clinically    Acute on chronic heart failure with preserved ejection fraction (HCC)  Assessment & Plan  Wt Readings from Last 3 Encounters:   01/26/24 129 kg (284 lb 6.4 oz)   11/09/23 129 kg (284 lb 12.8 oz)   09/20/23 128 kg (283 lb)     Appears volume overloaded  Worsening shortness of breath with bilateral lower extremity edema  Chest x-ray-pulmonary venous congestion  BNP-174, in obese patient  Echo 08/23-preserved EF, diastolic dysfunction, right ventricular systolic pressure 61  Home regimen-Bumex 2 mg twice daily  Continue with IV Bumex 4 mg twice daily  Fluid restriction 1500 mL  Salt restriction  Strict I's and O's  Appreciate Cardiology input    Paroxysmal atrial fibrillation (HCC)  Assessment & Plan  Rate controlled with Toprol- mg daily  Anticoagulated with Eliquis 5 mg twice daily  Continue to monitor potassium and magnesium, and replenish to goal    Type 2 diabetes mellitus with stage 2 chronic kidney disease, with long-term current use of insulin ([facility])  Assessment & Plan  Lab Results   Component Value Date    HGBA1C 8.5 (H) 01/22/2024       Recent Labs     01/25/24  1621 01/25/24 2010 01/26/24  0740 01/26/24  1131   POCGLU 176* 189* 134 208*         Blood Sugar Average: Last 72 hrs:  (P) 223.3015614829556733  Currently uncontrolled, as per recent A1c  Home regimen consists of glargine 30 units at bedtime, insulin lispro 30 units with breakfast, 15 units with lunch and dinner, metformin 500 mg twice daily and Ozempic weekly  Continue with  insulin glargine 30 units at bedtime  Increase humalog to 10 units TID on 1/25  Additional coverage with sliding scale  Adjust regimen as needed  Carb consistent diet  Frequent Accu-Cheks and hypoglycemia protocol    Hypercholesterolemia  Assessment & Plan  Continue with atorvastatin 40 mg daily at bedtime    Obstructive sleep apnea  Assessment & Plan  Uses CPAP at home  Follows with pulmonology on outpatient, awaiting BiPAP study with transcutaneous oxygen monitoring  Continue with BiPAP while inpatient    Esophageal reflux  Assessment & Plan  Continue with Protonix         VTE Pharmacologic Prophylaxis: VTE Score: 8 High Risk (Score >/= 5) - Pharmacological DVT Prophylaxis Ordered: apixaban (Eliquis). Sequential Compression Devices Ordered.    Mobility:   Basic Mobility Inpatient Raw Score: 23  JH-HLM Goal: 7: Walk 25 feet or more  JH-HLM Achieved: 3: Sit at edge of bed  HLM Goal NOT achieved. Continue with multidisciplinary rounding and encourage appropriate mobility to improve upon HLM goals.    Patient Centered Rounds: I performed bedside rounds with nursing staff today.   Discussions with Specialists or Other Care Team Provider: POLO, appreciate cardiology input    Education and Discussions with Family / Patient: Patient declined call to .     Total Time Spent on Date of Encounter in care of patient: 40 mins. This time was spent on one or more of the following: performing physical exam; counseling and coordination of care; obtaining or reviewing history; documenting in the medical record; reviewing/ordering tests, medications or procedures; communicating with other healthcare professionals and discussing with patient's family/caregivers.    Current Length of Stay: 4 day(s)  Current Patient Status: Inpatient   Certification Statement: The patient will continue to require additional inpatient hospital stay due to IV diuresis  Discharge Plan: Anticipate discharge in 48 hrs to home.    Code Status:  Level 1 - Full Code    Subjective:   Patient continues to feel better each day, shortness of breath gradually improving.  Denies chest pain/palpitations, nausea/vomiting, abdominal pain.    Objective:     Vitals:   Temp (24hrs), Av.7 °F (36.5 °C), Min:97.2 °F (36.2 °C), Max:98.3 °F (36.8 °C)    Temp:  [97.2 °F (36.2 °C)-98.3 °F (36.8 °C)] 97.2 °F (36.2 °C)  HR:  [] 112  Resp:  [18-21] 21  BP: ()/(66-83) 116/83  SpO2:  [89 %-100 %] 92 %  Body mass index is 50.38 kg/m².     Input and Output Summary (last 24 hours):     Intake/Output Summary (Last 24 hours) at 2024 1417  Last data filed at 2024 1330  Gross per 24 hour   Intake 1002 ml   Output 5250 ml   Net -4248 ml       Physical Exam:   Physical Exam  Vitals and nursing note reviewed.   Constitutional:       Appearance: Normal appearance.      Interventions: Nasal cannula in place.      Comments: No acute distress   HENT:      Head: Normocephalic.   Eyes:      General: No scleral icterus.     Extraocular Movements: Extraocular movements intact.      Conjunctiva/sclera: Conjunctivae normal.   Cardiovascular:      Rate and Rhythm: Normal rate. Rhythm irregularly irregular.   Pulmonary:      Breath sounds: Rales present. No wheezing or rhonchi.   Abdominal:      General: Bowel sounds are normal.      Palpations: Abdomen is soft.      Tenderness: There is no abdominal tenderness. There is no guarding or rebound.   Musculoskeletal:         General: No swelling, tenderness or deformity.      Cervical back: Normal range of motion.      Comments: Able to move upper/lower ext bilaterally, 1+ pitting LE edema   Skin:     General: Skin is warm and dry.   Neurological:      Mental Status: She is alert and oriented to person, place, and time.   Psychiatric:         Mood and Affect: Mood normal.         Speech: Speech normal.         Behavior: Behavior normal.          Additional Data:     Labs:  Results from last 7 days   Lab Units 24  0410   WBC  Thousand/uL 10.19*   HEMOGLOBIN g/dL 9.1*   HEMATOCRIT % 32.4*   PLATELETS Thousands/uL 298   NEUTROS PCT % 72   LYMPHS PCT % 17   MONOS PCT % 10   EOS PCT % 0     Results from last 7 days   Lab Units 01/26/24  0410 01/22/24  1539 01/22/24  1534   SODIUM mmol/L 137   < > 140   POTASSIUM mmol/L 3.9   < > 3.3*   CHLORIDE mmol/L 92*   < > 92*   CO2 mmol/L 39*   < > 36*   CO2, I-STAT   --    < >  --    BUN mg/dL 18   < > 17   CREATININE mg/dL 0.63   < > 1.32*   ANION GAP mmol/L 6   < > 12   CALCIUM mg/dL 8.2*   < > 8.3*   ALBUMIN g/dL  --   --  4.1   TOTAL BILIRUBIN mg/dL  --   --  0.64   ALK PHOS U/L  --   --  140*   ALT U/L  --   --  22   AST U/L  --   --  22   GLUCOSE RANDOM mg/dL 123   < > 97    < > = values in this interval not displayed.         Results from last 7 days   Lab Units 01/26/24  1131 01/26/24  0740 01/25/24 2010 01/25/24  1621 01/25/24  1119 01/25/24  0715 01/24/24  2133 01/24/24  1618 01/24/24  1114 01/24/24  0721 01/23/24  2025 01/23/24  1621   POC GLUCOSE mg/dl 208* 134 189* 176* 130 140 196* 161* 258* 209* 328* 398*     Results from last 7 days   Lab Units 01/22/24  1534   HEMOGLOBIN A1C % 8.5*           Lines/Drains:  Invasive Devices       Peripheral Intravenous Line  Duration             Peripheral IV 01/22/24 Proximal;Right;Ventral (anterior) Forearm 3 days    Peripheral IV 01/26/24 Dorsal (posterior);Left Hand <1 day                      Telemetry:  Telemetry Orders (From admission, onward)               24 Hour Telemetry Monitoring  Continuous x 24 Hours (Telem)        Question:  Reason for 24 Hour Telemetry  Answer:  Decompensated CHF- and any one of the following: continuous diuretic infusion or total diuretic dose >200 mg daily, associated electrolyte derangement (I.e. K < 3.0), ionotropic drip (continuous infusion), hx of ventricular arrhythmia, or new EF < 35%                     Telemetry Reviewed: Atrial fibrillation. HR averaging 100s  Indication for Continued Telemetry Use:  Arrthymias requiring medical therapy             Imaging: No pertinent imaging reviewed.    Recent Cultures (last 7 days):         Last 24 Hours Medication List:   Current Facility-Administered Medications   Medication Dose Route Frequency Provider Last Rate    acetaminophen  650 mg Oral Q6H PRN Rosamaria Miller PA-C      albuterol  2 puff Inhalation Q4H PRN Hawa Marsh MD      apixaban  5 mg Oral BID Hawa Marsh MD      atorvastatin  40 mg Oral Daily With Dinner Hawa Marsh MD      budesonide  0.5 mg Nebulization Q12H Hawa Marsh MD      bumetanide  4 mg Intravenous BID Hawa Marsh MD      docusate sodium  100 mg Oral BID Hawa Marsh MD      Empagliflozin  10 mg Oral Daily Chuck Harrell PA-C      escitalopram  20 mg Oral Daily Hawa Marsh MD      insulin glargine  30 Units Subcutaneous HS Hawa Marsh MD      insulin lispro  1-5 Units Subcutaneous TID AC Hawa Marsh MD      insulin lispro  1-5 Units Subcutaneous HS Hawa Marsh MD      insulin lispro  10 Units Subcutaneous TID With Meals Rupal Mercedes PA-C      loratadine  10 mg Oral Daily Hawa Marsh MD      metoprolol succinate  100 mg Oral Daily Hawa Marsh MD      montelukast  10 mg Oral HS Hawa Marsh MD      pantoprazole  40 mg Oral Daily Before Breakfast Hawa Marsh MD      potassium chloride  40 mEq Oral Daily Hawa Marsh MD      traZODone  150 mg Oral HS Hawa Marsh MD          Today, Patient Was Seen By: Rupal Mercedes PA-C    **Please Note: This note may have been constructed using a voice recognition system.**

## 2024-01-26 NOTE — ASSESSMENT & PLAN NOTE
Wt Readings from Last 3 Encounters:   01/26/24 129 kg (284 lb 6.4 oz)   11/09/23 129 kg (284 lb 12.8 oz)   09/20/23 128 kg (283 lb)     Appears volume overloaded  Worsening shortness of breath with bilateral lower extremity edema  Chest x-ray-pulmonary venous congestion  BNP-174, in obese patient  Echo 08/23-preserved EF, diastolic dysfunction, right ventricular systolic pressure 61  Home regimen-Bumex 2 mg twice daily  Continue with IV Bumex 4 mg twice daily  Fluid restriction 1500 mL  Salt restriction  Strict I's and O's  Appreciate Cardiology input

## 2024-01-26 NOTE — ASSESSMENT & PLAN NOTE
Lab Results   Component Value Date    HGBA1C 8.5 (H) 01/22/2024       Recent Labs     01/25/24  1621 01/25/24 2010 01/26/24  0740 01/26/24  1131   POCGLU 176* 189* 134 208*         Blood Sugar Average: Last 72 hrs:  (P) [id number]  Currently uncontrolled, as per recent A1c  Home regimen consists of glargine 30 units at bedtime, insulin lispro 30 units with breakfast, 15 units with lunch and dinner, metformin 500 mg twice daily and Ozempic weekly  Continue with insulin glargine 30 units at bedtime  Increase humalog to 10 units TID on 1/25  Additional coverage with sliding scale  Adjust regimen as needed  Carb consistent diet  Frequent Accu-Cheks and hypoglycemia protocol

## 2024-01-26 NOTE — ASSESSMENT & PLAN NOTE
Recent Labs     01/24/24  0458 01/25/24  0222 01/26/24  0410   CREATININE 0.72 0.76 0.63   EGFR 88 82 94       Estimated Creatinine Clearance: 116.7 mL/min (by C-G formula based on SCr of 0.63 mg/dL).     Baseline creatinine-0.5-0.7  Etiology-prerenal azotemia in setting of volume overload/acute diastolic heart failure  Continue with IV Bumex  Creatinine stable  Urinary retention protocol  Avoid nephrotoxic agents  Monitor BMP daily

## 2024-01-26 NOTE — CASE MANAGEMENT
Case Management Discharge Planning Note    Patient name Mattie Joy  Location /-01 MRN [id number]  : 1960 Date 2024       Current Admission Date: 2024  Current Admission Diagnosis:Acute on chronic hypercapnic respiratory failure (HCC)   Patient Active Problem List    Diagnosis Date Noted    IVELISSE (acute kidney injury) (HCC) 2024    Eosinophilic asthma 2023    COPD, severe with acute exacerbation 2023    Hepatic steatosis 2023    Morbid obesity (HCC) 02/15/2023    Diabetic polyneuropathy associated with type 2 diabetes mellitus (HCC) 11/15/2021    Type 2 diabetes mellitus with hyperglycemia (HCC) 2021    Tubular adenoma of colon 2021    Transaminitis 2021    Gastric polyp 2021    Class 3 severe obesity in adult (HCC) 2021    Pulmonary hypertension (HCC) 2020    Acute on chronic hypercapnic respiratory failure (HCC) 2020    Insomnia 10/28/2020    Abnormal CT of the chest 2020    Lactic acidosis 2020    Acute on chronic heart failure with preserved ejection fraction (HCC) 2020    Microcytic anemia 2020    Neck pain, chronic 2019    Current moderate episode of major depressive disorder without prior episode (HCC) 2019    Paroxysmal atrial fibrillation (HCC) 2018    Severe persistent asthma 2018    Abnormal computed tomography angiography (CTA) of abdomen 2018    Abnormal CT of the abdomen 2018    Iron deficiency anemia due to chronic blood loss 2018    Type 2 diabetes mellitus with stage 2 chronic kidney disease, with long-term current use of insulin (HCC)     Ganglion cyst of flexor tendon sheath 2017    Paresthesia of upper extremity 2017    Cervical radiculopathy 2017    Elevated liver enzymes 2015    Sexual dysfunction 2014    Chronic low back pain 10/15/2014    Hypercholesterolemia 2014    Lumbar radiculopathy  09/09/2014    Esophageal reflux 06/23/2014    Hypertensive heart and chronic kidney disease with heart failure and stage 1 through stage 4 chronic kidney disease, or chronic kidney disease (HCC) 03/24/2014    Obstructive sleep apnea 03/09/2014      LOS (days): 4  Geometric Mean LOS (GMLOS) (days): 3.9  Days to GMLOS:0     OBJECTIVE:  Risk of Unplanned Readmission Score: 37.17         Current admission status: Inpatient   Preferred Pharmacy:   [facility]- [place], [place] - [facility] [address]  [address] [address]  Phone: [phone] Fax: [phone]    [facility] [place] [place] [facility] [place] [address]  Phone: [phone] Fax: [phone]    Primary Care Provider: Laith Olivares MD    Primary Insurance: MEDICARE  Secondary Insurance: [employer]    DISCHARGE DETAILS:       IMM Given (Date):: 01/26/24  IMM Given to:: Patient  IMM reviewed with patient, patient agrees with discharge determination.

## 2024-01-27 LAB
ANION GAP SERPL CALCULATED.3IONS-SCNC: 5 MMOL/L
BASOPHILS # BLD AUTO: 0.02 THOUSANDS/ÂΜL (ref 0–0.1)
BASOPHILS NFR BLD AUTO: 0 % (ref 0–1)
BUN SERPL-MCNC: 17 MG/DL (ref 5–25)
CALCIUM SERPL-MCNC: 7.8 MG/DL (ref 8.4–10.2)
CHLORIDE SERPL-SCNC: 91 MMOL/L (ref 96–108)
CO2 SERPL-SCNC: 42 MMOL/L (ref 21–32)
CREAT SERPL-MCNC: 0.58 MG/DL (ref 0.6–1.3)
EOSINOPHIL # BLD AUTO: 0 THOUSAND/ÂΜL (ref 0–0.61)
EOSINOPHIL NFR BLD AUTO: 0 % (ref 0–6)
ERYTHROCYTE [DISTWIDTH] IN BLOOD BY AUTOMATED COUNT: 17.7 % (ref 11.6–15.1)
GFR SERPL CREATININE-BSD FRML MDRD: 97 ML/MIN/1.73SQ M
GLUCOSE SERPL-MCNC: 139 MG/DL (ref 65–140)
GLUCOSE SERPL-MCNC: 152 MG/DL (ref 65–140)
GLUCOSE SERPL-MCNC: 161 MG/DL (ref 65–140)
GLUCOSE SERPL-MCNC: 216 MG/DL (ref 65–140)
GLUCOSE SERPL-MCNC: 245 MG/DL (ref 65–140)
HCT VFR BLD AUTO: 31.9 % (ref 34.8–46.1)
HGB BLD-MCNC: 9 G/DL (ref 11.5–15.4)
IMM GRANULOCYTES # BLD AUTO: 0.05 THOUSAND/UL (ref 0–0.2)
IMM GRANULOCYTES NFR BLD AUTO: 1 % (ref 0–2)
LYMPHOCYTES # BLD AUTO: 1.73 THOUSANDS/ÂΜL (ref 0.6–4.47)
LYMPHOCYTES NFR BLD AUTO: 16 % (ref 14–44)
MAGNESIUM SERPL-MCNC: 2.5 MG/DL (ref 1.9–2.7)
MCH RBC QN AUTO: 19.6 PG (ref 26.8–34.3)
MCHC RBC AUTO-ENTMCNC: 28.2 G/DL (ref 31.4–37.4)
MCV RBC AUTO: 70 FL (ref 82–98)
MONOCYTES # BLD AUTO: 0.82 THOUSAND/ÂΜL (ref 0.17–1.22)
MONOCYTES NFR BLD AUTO: 8 % (ref 4–12)
NEUTROPHILS # BLD AUTO: 7.99 THOUSANDS/ÂΜL (ref 1.85–7.62)
NEUTS SEG NFR BLD AUTO: 75 % (ref 43–75)
NRBC BLD AUTO-RTO: 0 /100 WBCS
PLATELET # BLD AUTO: 281 THOUSANDS/UL (ref 149–390)
PMV BLD AUTO: 11.2 FL (ref 8.9–12.7)
POTASSIUM SERPL-SCNC: 3.6 MMOL/L (ref 3.5–5.3)
RBC # BLD AUTO: 4.59 MILLION/UL (ref 3.81–5.12)
SODIUM SERPL-SCNC: 138 MMOL/L (ref 135–147)
WBC # BLD AUTO: 10.61 THOUSAND/UL (ref 4.31–10.16)

## 2024-01-27 PROCEDURE — 80048 BASIC METABOLIC PNL TOTAL CA: CPT | Performed by: INTERNAL MEDICINE

## 2024-01-27 PROCEDURE — 99232 SBSQ HOSP IP/OBS MODERATE 35: CPT | Performed by: INTERNAL MEDICINE

## 2024-01-27 PROCEDURE — 82948 REAGENT STRIP/BLOOD GLUCOSE: CPT

## 2024-01-27 PROCEDURE — 94760 N-INVAS EAR/PLS OXIMETRY 1: CPT

## 2024-01-27 PROCEDURE — 94660 CPAP INITIATION&MGMT: CPT

## 2024-01-27 PROCEDURE — 94640 AIRWAY INHALATION TREATMENT: CPT

## 2024-01-27 PROCEDURE — 94003 VENT MGMT INPAT SUBQ DAY: CPT

## 2024-01-27 PROCEDURE — 99232 SBSQ HOSP IP/OBS MODERATE 35: CPT | Performed by: PHYSICIAN ASSISTANT

## 2024-01-27 PROCEDURE — 83735 ASSAY OF MAGNESIUM: CPT | Performed by: INTERNAL MEDICINE

## 2024-01-27 PROCEDURE — 85025 COMPLETE CBC W/AUTO DIFF WBC: CPT | Performed by: INTERNAL MEDICINE

## 2024-01-27 RX ADMIN — LORATADINE 10 MG: 10 TABLET ORAL at 08:55

## 2024-01-27 RX ADMIN — BUMETANIDE 4 MG: 0.25 INJECTION INTRAMUSCULAR; INTRAVENOUS at 08:55

## 2024-01-27 RX ADMIN — INSULIN LISPRO 2 UNITS: 100 INJECTION, SOLUTION INTRAVENOUS; SUBCUTANEOUS at 17:59

## 2024-01-27 RX ADMIN — INSULIN LISPRO 10 UNITS: 100 INJECTION, SOLUTION INTRAVENOUS; SUBCUTANEOUS at 08:53

## 2024-01-27 RX ADMIN — ATORVASTATIN CALCIUM 40 MG: 40 TABLET, FILM COATED ORAL at 17:59

## 2024-01-27 RX ADMIN — APIXABAN 5 MG: 5 TABLET, FILM COATED ORAL at 17:59

## 2024-01-27 RX ADMIN — DOCUSATE SODIUM 100 MG: 100 CAPSULE, LIQUID FILLED ORAL at 17:59

## 2024-01-27 RX ADMIN — BUDESONIDE 0.5 MG: 0.5 INHALANT ORAL at 20:31

## 2024-01-27 RX ADMIN — METOPROLOL SUCCINATE 100 MG: 50 TABLET, EXTENDED RELEASE ORAL at 08:55

## 2024-01-27 RX ADMIN — EMPAGLIFLOZIN 10 MG: 10 TABLET, FILM COATED ORAL at 08:55

## 2024-01-27 RX ADMIN — TRAZODONE HYDROCHLORIDE 150 MG: 100 TABLET ORAL at 21:38

## 2024-01-27 RX ADMIN — INSULIN LISPRO 1 UNITS: 100 INJECTION, SOLUTION INTRAVENOUS; SUBCUTANEOUS at 21:44

## 2024-01-27 RX ADMIN — PANTOPRAZOLE SODIUM 40 MG: 40 TABLET, DELAYED RELEASE ORAL at 05:30

## 2024-01-27 RX ADMIN — INSULIN LISPRO 2 UNITS: 100 INJECTION, SOLUTION INTRAVENOUS; SUBCUTANEOUS at 12:13

## 2024-01-27 RX ADMIN — MONTELUKAST 10 MG: 10 TABLET, FILM COATED ORAL at 21:38

## 2024-01-27 RX ADMIN — POTASSIUM CHLORIDE 40 MEQ: 1500 TABLET, EXTENDED RELEASE ORAL at 08:55

## 2024-01-27 RX ADMIN — DOCUSATE SODIUM 100 MG: 100 CAPSULE, LIQUID FILLED ORAL at 08:55

## 2024-01-27 RX ADMIN — BUDESONIDE 0.5 MG: 0.5 INHALANT ORAL at 08:26

## 2024-01-27 RX ADMIN — BUMETANIDE 4 MG: 0.25 INJECTION INTRAMUSCULAR; INTRAVENOUS at 17:59

## 2024-01-27 RX ADMIN — APIXABAN 5 MG: 5 TABLET, FILM COATED ORAL at 08:55

## 2024-01-27 RX ADMIN — INSULIN GLARGINE 30 UNITS: 100 INJECTION, SOLUTION SUBCUTANEOUS at 21:38

## 2024-01-27 RX ADMIN — INSULIN LISPRO 10 UNITS: 100 INJECTION, SOLUTION INTRAVENOUS; SUBCUTANEOUS at 17:59

## 2024-01-27 RX ADMIN — ESCITALOPRAM OXALATE 20 MG: 20 TABLET ORAL at 08:55

## 2024-01-27 RX ADMIN — INSULIN LISPRO 10 UNITS: 100 INJECTION, SOLUTION INTRAVENOUS; SUBCUTANEOUS at 12:13

## 2024-01-27 NOTE — PLAN OF CARE
Problem: CARDIOVASCULAR - ADULT  Goal: Maintains optimal cardiac output and hemodynamic stability  Description: INTERVENTIONS:  - Monitor I/O, vital signs and rhythm  - Monitor for S/S and trends of decreased cardiac output  - Administer and titrate ordered vasoactive medications to optimize hemodynamic stability  - Assess quality of pulses, skin color and temperature  - Assess for signs of decreased coronary artery perfusion  - Instruct patient to report change in severity of symptoms  Outcome: Progressing     Problem: CARDIOVASCULAR - ADULT  Goal: Maintains optimal cardiac output and hemodynamic stability  Description: INTERVENTIONS:  - Monitor I/O, vital signs and rhythm  - Monitor for S/S and trends of decreased cardiac output  - Administer and titrate ordered vasoactive medications to optimize hemodynamic stability  - Assess quality of pulses, skin color and temperature  - Assess for signs of decreased coronary artery perfusion  - Instruct patient to report change in severity of symptoms  Outcome: Progressing     Problem: RESPIRATORY - ADULT  Goal: Achieves optimal ventilation and oxygenation  Description: INTERVENTIONS:  - Assess for changes in respiratory status  - Assess for changes in mentation and behavior  - Position to facilitate oxygenation and minimize respiratory effort  - Oxygen administered by appropriate delivery if ordered  - Initiate smoking cessation education as indicated  - Encourage broncho-pulmonary hygiene including cough, deep breathe, Incentive Spirometry  - Assess the need for suctioning and aspirate as needed  - Assess and instruct to report SOB or any respiratory difficulty  - Respiratory Therapy support as indicated  Outcome: Progressing     Problem: METABOLIC, FLUID AND ELECTROLYTES - ADULT  Goal: Electrolytes maintained within normal limits  Description: INTERVENTIONS:  - Monitor labs and assess patient for signs and symptoms of electrolyte imbalances  - Administer electrolyte  replacement as ordered  - Monitor response to electrolyte replacements, including repeat lab results as appropriate  - Instruct patient on fluid and nutrition as appropriate  Outcome: Progressing     Problem: METABOLIC, FLUID AND ELECTROLYTES - ADULT  Goal: Glucose maintained within target range  Description: INTERVENTIONS:  - Monitor Blood Glucose as ordered  - Assess for signs and symptoms of hyperglycemia and hypoglycemia  - Administer ordered medications to maintain glucose within target range  - Assess nutritional intake and initiate nutrition service referral as needed  Outcome: Progressing

## 2024-01-27 NOTE — PROGRESS NOTES
[facility]  Progress Note  Name: Mattie Joy I  MRN: [id number]  Unit/Bed#: -01 I Date of Admission: 1/22/2024   Date of Service: 1/27/2024 I Hospital Day: 5    Assessment/Plan   * Acute on chronic hypercapnic respiratory failure (HCC)  Assessment & Plan  Baseline oxygen requirement 4 L continuously  Underlying severe COPD, severe pulmonary hypertension and diastolic heart failure  BNP-174  COVID/respiratory panel-negative  Chest x-ray consistent with pulmonary venous congestion, final reading pending  IV Solu-Medrol discontinued  Continue with IV Bumex 4 mg BID  Continue with telemetry  Appreciate Cardiology and Pulmonology recommendations  Respiratory protocol  Monitor volume status  Strict I's and O's  Fluid restriction  Weaned to baseline of 4 L nasal cannula    IVELISSE (acute kidney injury) (HCC)  Assessment & Plan  Recent Labs     01/25/24  0222 01/26/24  0410 01/27/24  0506   CREATININE 0.76 0.63 0.58*   EGFR 82 94 97     Estimated Creatinine Clearance: 126.7 mL/min (A) (by C-G formula based on SCr of 0.58 mg/dL (L)).     Baseline creatinine-0.5-0.7  Etiology-prerenal azotemia in setting of volume overload/acute diastolic heart failure  Continue with IV Bumex  Creatinine stable  Urinary retention protocol  Avoid nephrotoxic agents  Monitor BMP daily    COPD, severe (HCC)  Assessment & Plan  Home regimen consists of benralizumab, Singulair, Pulmicort nebulization twice daily, Xopenex and albuterol as needed, Bevespi twice daily  Was seen at pulmonology office on 1/22, had respiratory distress, was given prednisone taper and urged to go to ED for evaluation  Status post IV Solu-Medrol 125 mg in ED  IV Solu-Medrol 40mg discontinued  Respiratory protocol  Continue with Pulmicort nebulization twice daily  Albuterol and Xopenex as needed  Singulair 10 mg daily  Inpatient consult to Pulmonology -no further inpatient recommendations      Morbid obesity (HCC)  Assessment &  Plan  Counseling about adaptation of healthy dietary habits and lifestyle modifications, once medically stable  BMI >50  Affects all aspects of care    Pulmonary hypertension (HCC)  Assessment & Plan  As per recent echo in 08/23, right ventricular systolic pressure is severely elevated at 61 mmHg  Continue with IV Bumex  Continue to monitor volume status clinically    Acute on chronic heart failure with preserved ejection fraction (HCC)  Assessment & Plan  Wt Readings from Last 3 Encounters:   01/27/24 129 kg (284 lb 6.3 oz)   11/09/23 129 kg (284 lb 12.8 oz)   09/20/23 128 kg (283 lb)     Appears volume overloaded  Worsening shortness of breath with bilateral lower extremity edema  Chest x-ray-pulmonary venous congestion  BNP-174, in obese patient  Echo 08/23-preserved EF, diastolic dysfunction, right ventricular systolic pressure 61  Home regimen-Bumex 2 mg twice daily  Continue with IV Bumex 4 mg twice daily.  Cr stable  Fluid restriction 1500 mL  Salt restriction  Strict I's and O's  Appreciate Cardiology input    Paroxysmal atrial fibrillation (HCC)  Assessment & Plan  Rate controlled with Toprol- mg daily  Anticoagulated with Eliquis 5 mg twice daily    Type 2 diabetes mellitus with stage 2 chronic kidney disease, with long-term current use of insulin ([facility])  Assessment & Plan  Lab Results   Component Value Date    HGBA1C 8.5 (H) 01/22/2024       Recent Labs     01/26/24  1131 01/26/24  1709 01/26/24  2120 01/27/24  0727   POCGLU 208* 180* 209* 139         Blood Sugar Average: Last 72 hrs:  (P) 179.0523480363484367  Currently uncontrolled, as per recent A1c  Home regimen consists of glargine 30 units at bedtime, insulin lispro 30 units with breakfast, 15 units with lunch and dinner, metformin 500 mg twice daily and Ozempic weekly  Continue with insulin glargine 30 units at bedtime  Increased humalog to 10 units TID on 1/25  Additional coverage with sliding scale  Adjust regimen as needed  Carb consistent  diet - unfortunately largely noncompliant.  Per nursing staff frequently asking for various snacks throughout the day  Frequent Accu-Cheks and hypoglycemia protocol    Hypercholesterolemia  Assessment & Plan  Continue with atorvastatin 40 mg daily at bedtime    Obstructive sleep apnea  Assessment & Plan  Uses CPAP at home  Follows with pulmonology on outpatient, awaiting BiPAP study with transcutaneous oxygen monitoring  Continue with BiPAP while inpatient    Esophageal reflux  Assessment & Plan  Continue with Protonix         VTE Pharmacologic Prophylaxis: VTE Score: 8 High Risk (Score >/= 5) - Pharmacological DVT Prophylaxis Ordered: apixaban (Eliquis). Sequential Compression Devices Ordered.    Mobility:   Basic Mobility Inpatient Raw Score: 23  JH-HLM Goal: 7: Walk 25 feet or more  JH-HLM Achieved: 3: Sit at edge of bed  HLM Goal NOT achieved. Continue with multidisciplinary rounding and encourage appropriate mobility to improve upon HLM goals.    Patient Centered Rounds: I performed bedside rounds with nursing staff today.   Discussions with Specialists or Other Care Team Provider:     Education and Discussions with Family / Patient: Patient declined call to .     Total Time Spent on Date of Encounter in care of patient: 40 mins. This time was spent on one or more of the following: performing physical exam; counseling and coordination of care; obtaining or reviewing history; documenting in the medical record; reviewing/ordering tests, medications or procedures; communicating with other healthcare professionals and discussing with patient's family/caregivers.    Current Length of Stay: 5 day(s)  Current Patient Status: Inpatient   Certification Statement: The patient will continue to require additional inpatient hospital stay due to IV diuresis  Discharge Plan: Anticipate discharge in 48 hrs to home.    Code Status: Level 1 - Full Code    Subjective:   Patient feels somewhat better each day.   Admits to poor sleep last night.  Still reports significant shortness of breath on exertion.  Denies chest pain/palpitations, nauseous vomiting, abdominal pain.    Objective:     Vitals:   Temp (24hrs), Av °F (36.7 °C), Min:97.6 °F (36.4 °C), Max:98.4 °F (36.9 °C)    Temp:  [97.6 °F (36.4 °C)-98.4 °F (36.9 °C)] 98.4 °F (36.9 °C)  HR:  [] 113  BP: (107-132)/(59-95) 127/87  SpO2:  [92 %-98 %] 98 %  Body mass index is 50.38 kg/m².     Input and Output Summary (last 24 hours):     Intake/Output Summary (Last 24 hours) at 2024 1011  Last data filed at 2024 0904  Gross per 24 hour   Intake 2106 ml   Output 3850 ml   Net -1744 ml       Physical Exam:   Physical Exam  Vitals and nursing note reviewed.   Constitutional:       Appearance: Normal appearance.      Interventions: Nasal cannula in place.      Comments: No acute distress   HENT:      Head: Normocephalic.   Eyes:      General: No scleral icterus.     Extraocular Movements: Extraocular movements intact.      Conjunctiva/sclera: Conjunctivae normal.   Cardiovascular:      Rate and Rhythm: Tachycardia present. Rhythm irregularly irregular.   Pulmonary:      Breath sounds: Decreased breath sounds present. No wheezing, rhonchi or rales.   Abdominal:      General: Bowel sounds are normal.      Palpations: Abdomen is soft.      Tenderness: There is no abdominal tenderness. There is no guarding or rebound.   Musculoskeletal:         General: No swelling, tenderness or deformity.      Cervical back: Normal range of motion.      Comments: Able to move upper/lower ext bilaterally, 1+ pitting LE edema   Skin:     General: Skin is warm and dry.   Neurological:      Mental Status: She is alert and oriented to person, place, and time.   Psychiatric:         Mood and Affect: Mood normal.         Speech: Speech normal.         Behavior: Behavior normal.          Additional Data:     Labs:  Results from last 7 days   Lab Units 24  0506   WBC Thousand/uL  10.61*   HEMOGLOBIN g/dL 9.0*   HEMATOCRIT % 31.9*   PLATELETS Thousands/uL 281   NEUTROS PCT % 75   LYMPHS PCT % 16   MONOS PCT % 8   EOS PCT % 0     Results from last 7 days   Lab Units 24  0506 24  1539 24  1534   SODIUM mmol/L 138   < > 140   POTASSIUM mmol/L 3.6   < > 3.3*   CHLORIDE mmol/L 91*   < > 92*   CO2 mmol/L 42*   < > 36*   CO2, I-STAT   --    < >  --    BUN mg/dL 17   < > 17   CREATININE mg/dL 0.58*   < > 1.32*   ANION GAP mmol/L 5   < > 12   CALCIUM mg/dL 7.8*   < > 8.3*   ALBUMIN g/dL  --   --  4.1   TOTAL BILIRUBIN mg/dL  --   --  0.64   ALK PHOS U/L  --   --  140*   ALT U/L  --   --  22   AST U/L  --   --  22   GLUCOSE RANDOM mg/dL 152*   < > 97    < > = values in this interval not displayed.         Results from last 7 days   Lab Units 24  0727 24  2120 24  1709 24  1131 24  0740 24  2010 24  1621 24  1119 24  0715 24  2133 24  1618 24  1114   POC GLUCOSE mg/dl 139 209* 180* 208* 134 189* 176* 130 140 196* 161* 258*     Results from last 7 days   Lab Units 24  1534   HEMOGLOBIN A1C % 8.5*           Lines/Drains:  Invasive Devices       Peripheral Intravenous Line  Duration             Peripheral IV 24 Dorsal (posterior);Left Hand 1 day                      Telemetry:  Telemetry Orders (From admission, onward)               24 Hour Telemetry Monitoring  Continuous x 24 Hours (Telem)           Question:  Reason for 24 Hour Telemetry  Answer:  Decompensated CHF- and any one of the following: continuous diuretic infusion or total diuretic dose >200 mg daily, associated electrolyte derangement (I.e. K < 3.0), ionotropic drip (continuous infusion), hx of ventricular arrhythmia, or new EF < 35%                     Telemetry Reviewed: Atrial fibrillation. HR averaging 100  Indication for Continued Telemetry Use: Acute CHF on >200 mg lasix/day or equivalent dose or with new reduced EF.               Imaging: No pertinent imaging reviewed.    Recent Cultures (last 7 days):         Last 24 Hours Medication List:   Current Facility-Administered Medications   Medication Dose Route Frequency Provider Last Rate    acetaminophen  650 mg Oral Q6H PRN Rosamaria Miller PA-C      albuterol  2 puff Inhalation Q4H PRN Hawa Marsh MD      apixaban  5 mg Oral BID Hawa Marsh MD      atorvastatin  40 mg Oral Daily With Dinner Hawa Marsh MD      budesonide  0.5 mg Nebulization Q12H Hawa Marsh MD      bumetanide  4 mg Intravenous BID Hawa Marsh MD      docusate sodium  100 mg Oral BID Hawa Marsh MD      Empagliflozin  10 mg Oral Daily Chuck Harrell PA-C      escitalopram  20 mg Oral Daily Hawa Marsh MD      insulin glargine  30 Units Subcutaneous HS Hawa Marsh MD      insulin lispro  1-5 Units Subcutaneous TID AC Hawa Marhs MD      insulin lispro  1-5 Units Subcutaneous HS Hawa Marsh MD      insulin lispro  10 Units Subcutaneous TID With Meals Rupal Mercedes PA-C      loratadine  10 mg Oral Daily Hawa Marsh MD      metoprolol succinate  100 mg Oral Daily Hawa Marsh MD      montelukast  10 mg Oral HS Hawa Marsh MD      pantoprazole  40 mg Oral Daily Before Breakfast Hawa Marsh MD      potassium chloride  40 mEq Oral Daily Hawa Marsh MD      traZODone  150 mg Oral HS Hawa Marsh MD          Today, Patient Was Seen By: Rupal Mercedes PA-C    **Please Note: This note may have been constructed using a voice recognition system.**

## 2024-01-27 NOTE — ASSESSMENT & PLAN NOTE
Wt Readings from Last 3 Encounters:   01/27/24 129 kg (284 lb 6.3 oz)   11/09/23 129 kg (284 lb 12.8 oz)   09/20/23 128 kg (283 lb)     Appears volume overloaded  Worsening shortness of breath with bilateral lower extremity edema  Chest x-ray-pulmonary venous congestion  BNP-174, in obese patient  Echo 08/23-preserved EF, diastolic dysfunction, right ventricular systolic pressure 61  Home regimen-Bumex 2 mg twice daily  Continue with IV Bumex 4 mg twice daily.  Cr stable  Fluid restriction 1500 mL  Salt restriction  Strict I's and O's  Appreciate Cardiology input

## 2024-01-27 NOTE — ASSESSMENT & PLAN NOTE
Recent Labs     01/25/24  0222 01/26/24  0410 01/27/24  0506   CREATININE 0.76 0.63 0.58*   EGFR 82 94 97     Estimated Creatinine Clearance: 126.7 mL/min (A) (by C-G formula based on SCr of 0.58 mg/dL (L)).     Baseline creatinine-0.5-0.7  Etiology-prerenal azotemia in setting of volume overload/acute diastolic heart failure  Continue with IV Bumex  Creatinine stable  Urinary retention protocol  Avoid nephrotoxic agents  Monitor BMP daily

## 2024-01-27 NOTE — ASSESSMENT & PLAN NOTE
Baseline oxygen requirement 4 L continuously  Underlying severe COPD, severe pulmonary hypertension and diastolic heart failure  BNP-174  COVID/respiratory panel-negative  Chest x-ray consistent with pulmonary venous congestion, final reading pending  IV Solu-Medrol discontinued  Continue with IV Bumex 4 mg BID  Continue with telemetry  Appreciate Cardiology and Pulmonology recommendations  Respiratory protocol  Monitor volume status  Strict I's and O's  Fluid restriction  Weaned to baseline of 4 L nasal cannula

## 2024-01-27 NOTE — ASSESSMENT & PLAN NOTE
Lab Results   Component Value Date    HGBA1C 8.5 (H) 01/22/2024       Recent Labs     01/26/24  1131 01/26/24  1709 01/26/24  2120 01/27/24  0727   POCGLU 208* 180* 209* 139         Blood Sugar Average: Last 72 hrs:  (P) 179.3965277481347067  Currently uncontrolled, as per recent A1c  Home regimen consists of glargine 30 units at bedtime, insulin lispro 30 units with breakfast, 15 units with lunch and dinner, metformin 500 mg twice daily and Ozempic weekly  Continue with insulin glargine 30 units at bedtime  Increased humalog to 10 units TID on 1/25  Additional coverage with sliding scale  Adjust regimen as needed  Carb consistent diet - unfortunately largely noncompliant.  Per nursing staff frequently asking for various snacks throughout the day  Frequent Accu-Cheks and hypoglycemia protocol

## 2024-01-27 NOTE — PROGRESS NOTES
"Cardiology Progress Note - Mattie Joy 63 y.o. female MRN: [id number]    Unit/Bed#: -01 Encounter: [id number]      Assessment:  Principal Problem:    Acute on chronic hypercapnic respiratory failure (HCC)  Active Problems:    Esophageal reflux    Obstructive sleep apnea    Hypercholesterolemia    Type 2 diabetes mellitus with stage 2 chronic kidney disease, with long-term current use of insulin (HCC)    Paroxysmal atrial fibrillation (HCC)    Acute on chronic heart failure with preserved ejection fraction (HCC)    Pulmonary hypertension (HCC)    Morbid obesity (HCC)    Eosinophilic asthma    COPD, severe (HCC)    IVELISSE (acute kidney injury) ([facility])      Plan:  Patient with no issues overnight.  She has no chest pain or significant dyspnea.  She continues on oxygen.  She is in atrial fibrillation on telemetry.  She continues on intravenous Bumex.  BMP today with potassium of 3.6 and creatinine 0.58.  Hemoglobin 9.0.  Patient on potassium supplement.  Will continue present medical regimen.    Subjective:   Patient seen and examined.  No significant events overnight.  negative.    Objective:     Vitals: Blood pressure 127/87, pulse (!) 113, temperature 98.4 °F (36.9 °C), resp. rate 21, height 5' 3\" (1.6 m), weight 129 kg (284 lb 6.3 oz), SpO2 98%, not currently breastfeeding., Body mass index is 50.38 kg/m².,   Orthostatic Blood Pressures      Flowsheet Row Most Recent Value   Blood Pressure 127/87 filed at 01/27/2024 0856   Patient Position - Orthostatic VS Lying filed at 01/24/2024 2023        ,      Intake/Output Summary (Last 24 hours) at 1/27/2024 1116  Last data filed at 1/27/2024 0904  Gross per 24 hour   Intake 1806 ml   Output 3850 ml   Net -2044 ml           Physical Exam:    GEN: Mattie Joy appears well, alert and oriented x 3, pleasant and cooperative   NECK: supple, no carotid bruits, no JVD or HJR  HEART: normal rate, irregular rhythm, normal S1 and S2, no murmurs, clicks, gallops or rubs   LUNGS: " clear to auscultation bilaterally; no wheezes, rales, or rhonchi   ABDOMEN: normal bowel sounds, soft, no tenderness, no distention  EXTREMITIES: edema  SKIN: warm and well perfused, no suspicious lesions on exposed skin    Labs & Results:    No results displayed because visit has over 200 results.          XR chest 1 view portable    Result Date: 1/22/2024  Narrative: CHEST INDICATION:   SOB. COMPARISON: 10/30/2023 EXAM PERFORMED/VIEWS:  XR CHEST PORTABLE FINDINGS: Cardiomegaly is stable. Pulmonary vascular congestion present. No focal consolidation or pleural fluid or pneumothorax. Mild degenerative change of the shoulders and acromioclavicular joints     Impression: Cardiomegaly and vascular congestion Workstation performed: [id number]       EKG personally reviewed by Chuck Herrera MD.     Counseling / Coordination of Care  Total floor / unit time spent today 30 minutes.  Greater than 50% of total time was spent with the patient and / or family counseling and / or coordination of care.

## 2024-01-28 LAB
ANION GAP SERPL CALCULATED.3IONS-SCNC: 6 MMOL/L
BUN SERPL-MCNC: 17 MG/DL (ref 5–25)
CALCIUM SERPL-MCNC: 8.3 MG/DL (ref 8.4–10.2)
CHLORIDE SERPL-SCNC: 91 MMOL/L (ref 96–108)
CO2 SERPL-SCNC: 41 MMOL/L (ref 21–32)
CREAT SERPL-MCNC: 0.64 MG/DL (ref 0.6–1.3)
GFR SERPL CREATININE-BSD FRML MDRD: 93 ML/MIN/1.73SQ M
GLUCOSE SERPL-MCNC: 158 MG/DL (ref 65–140)
GLUCOSE SERPL-MCNC: 166 MG/DL (ref 65–140)
GLUCOSE SERPL-MCNC: 175 MG/DL (ref 65–140)
GLUCOSE SERPL-MCNC: 220 MG/DL (ref 65–140)
GLUCOSE SERPL-MCNC: 220 MG/DL (ref 65–140)
POTASSIUM SERPL-SCNC: 3.7 MMOL/L (ref 3.5–5.3)
SODIUM SERPL-SCNC: 138 MMOL/L (ref 135–147)

## 2024-01-28 PROCEDURE — 80048 BASIC METABOLIC PNL TOTAL CA: CPT | Performed by: INTERNAL MEDICINE

## 2024-01-28 PROCEDURE — 82948 REAGENT STRIP/BLOOD GLUCOSE: CPT

## 2024-01-28 PROCEDURE — 94003 VENT MGMT INPAT SUBQ DAY: CPT

## 2024-01-28 PROCEDURE — 94760 N-INVAS EAR/PLS OXIMETRY 1: CPT

## 2024-01-28 PROCEDURE — 99232 SBSQ HOSP IP/OBS MODERATE 35: CPT | Performed by: PHYSICIAN ASSISTANT

## 2024-01-28 PROCEDURE — 94660 CPAP INITIATION&MGMT: CPT

## 2024-01-28 PROCEDURE — 94640 AIRWAY INHALATION TREATMENT: CPT

## 2024-01-28 RX ADMIN — MONTELUKAST 10 MG: 10 TABLET, FILM COATED ORAL at 22:10

## 2024-01-28 RX ADMIN — PANTOPRAZOLE SODIUM 40 MG: 40 TABLET, DELAYED RELEASE ORAL at 05:43

## 2024-01-28 RX ADMIN — EMPAGLIFLOZIN 10 MG: 10 TABLET, FILM COATED ORAL at 08:40

## 2024-01-28 RX ADMIN — POTASSIUM CHLORIDE 40 MEQ: 1500 TABLET, EXTENDED RELEASE ORAL at 08:41

## 2024-01-28 RX ADMIN — INSULIN GLARGINE 30 UNITS: 100 INJECTION, SOLUTION SUBCUTANEOUS at 22:11

## 2024-01-28 RX ADMIN — TRAZODONE HYDROCHLORIDE 150 MG: 100 TABLET ORAL at 22:10

## 2024-01-28 RX ADMIN — INSULIN LISPRO 10 UNITS: 100 INJECTION, SOLUTION INTRAVENOUS; SUBCUTANEOUS at 08:33

## 2024-01-28 RX ADMIN — BUDESONIDE 0.5 MG: 0.5 INHALANT ORAL at 08:25

## 2024-01-28 RX ADMIN — BUMETANIDE 4 MG: 0.25 INJECTION INTRAMUSCULAR; INTRAVENOUS at 08:35

## 2024-01-28 RX ADMIN — BUDESONIDE 0.5 MG: 0.5 INHALANT ORAL at 20:57

## 2024-01-28 RX ADMIN — BUMETANIDE 4 MG: 0.25 INJECTION INTRAMUSCULAR; INTRAVENOUS at 17:24

## 2024-01-28 RX ADMIN — INSULIN LISPRO 1 UNITS: 100 INJECTION, SOLUTION INTRAVENOUS; SUBCUTANEOUS at 12:34

## 2024-01-28 RX ADMIN — INSULIN LISPRO 10 UNITS: 100 INJECTION, SOLUTION INTRAVENOUS; SUBCUTANEOUS at 17:24

## 2024-01-28 RX ADMIN — INSULIN LISPRO 1 UNITS: 100 INJECTION, SOLUTION INTRAVENOUS; SUBCUTANEOUS at 22:19

## 2024-01-28 RX ADMIN — ESCITALOPRAM OXALATE 20 MG: 20 TABLET ORAL at 08:40

## 2024-01-28 RX ADMIN — DOCUSATE SODIUM 100 MG: 100 CAPSULE, LIQUID FILLED ORAL at 08:41

## 2024-01-28 RX ADMIN — ATORVASTATIN CALCIUM 40 MG: 40 TABLET, FILM COATED ORAL at 17:24

## 2024-01-28 RX ADMIN — LORATADINE 10 MG: 10 TABLET ORAL at 08:40

## 2024-01-28 RX ADMIN — APIXABAN 5 MG: 5 TABLET, FILM COATED ORAL at 08:40

## 2024-01-28 RX ADMIN — INSULIN LISPRO 2 UNITS: 100 INJECTION, SOLUTION INTRAVENOUS; SUBCUTANEOUS at 17:25

## 2024-01-28 RX ADMIN — METOPROLOL SUCCINATE 100 MG: 50 TABLET, EXTENDED RELEASE ORAL at 08:40

## 2024-01-28 RX ADMIN — INSULIN LISPRO 2 UNITS: 100 INJECTION, SOLUTION INTRAVENOUS; SUBCUTANEOUS at 08:33

## 2024-01-28 RX ADMIN — INSULIN LISPRO 10 UNITS: 100 INJECTION, SOLUTION INTRAVENOUS; SUBCUTANEOUS at 12:34

## 2024-01-28 RX ADMIN — DOCUSATE SODIUM 100 MG: 100 CAPSULE, LIQUID FILLED ORAL at 17:25

## 2024-01-28 RX ADMIN — APIXABAN 5 MG: 5 TABLET, FILM COATED ORAL at 17:24

## 2024-01-28 NOTE — PROGRESS NOTES
Formerly [facility]  Progress Note  Name: Mattie Joy I  MRN: [id number]  Unit/Bed#: -01 I Date of Admission: 1/22/2024   Date of Service: 1/28/2024 I Hospital Day: 6    Assessment/Plan   * Acute on chronic hypercapnic respiratory failure (HCC)  Assessment & Plan  Baseline oxygen requirement 4 L continuously  Underlying severe COPD, severe pulmonary hypertension and diastolic heart failure  BNP-174  COVID/respiratory panel-negative  Chest x-ray consistent with pulmonary venous congestion, final reading pending  IV Solu-Medrol discontinued  Continue with IV Bumex 4 mg BID  Continue with telemetry  Appreciate Cardiology and Pulmonology recommendations  Respiratory protocol  Monitor volume status  Strict I's and O's  Fluid restriction  Weaned to baseline of 4 L nasal cannula    IVELISSE (acute kidney injury) (HCC)  Assessment & Plan  Recent Labs     01/26/24  0410 01/27/24  0506 01/28/24  0252   CREATININE 0.63 0.58* 0.64   EGFR 94 97 93     Estimated Creatinine Clearance: 114.3 mL/min (by C-G formula based on SCr of 0.64 mg/dL).     Baseline creatinine-0.5-0.7  Etiology-prerenal azotemia in setting of volume overload/acute diastolic heart failure  Continue with IV Bumex  Creatinine stable  Urinary retention protocol  Avoid nephrotoxic agents  Monitor BMP daily    COPD, severe (HCC)  Assessment & Plan  Home regimen consists of benralizumab, Singulair, Pulmicort nebulization twice daily, Xopenex and albuterol as needed, Bevespi twice daily  Was seen at pulmonology office on 1/22, had respiratory distress, was given prednisone taper and urged to go to ED for evaluation  Status post IV Solu-Medrol 125 mg in ED  IV Solu-Medrol 40mg discontinued  Respiratory protocol  Continue with Pulmicort nebulization twice daily  Albuterol and Xopenex as needed  Singulair 10 mg daily  Inpatient consult to Pulmonology -no further inpatient recommendations      Morbid obesity (HCC)  Assessment & Plan  Counseling  about adaptation of healthy dietary habits and lifestyle modifications, once medically stable  BMI >50  Affects all aspects of care    Pulmonary hypertension (HCC)  Assessment & Plan  As per recent echo in 08/23, right ventricular systolic pressure is severely elevated at 61 mmHg  Continue with IV Bumex  Continue to monitor volume status clinically    Acute on chronic heart failure with preserved ejection fraction (HCC)  Assessment & Plan  Wt Readings from Last 3 Encounters:   01/28/24 128 kg (282 lb 3.2 oz)   11/09/23 129 kg (284 lb 12.8 oz)   09/20/23 128 kg (283 lb)     Appears volume overloaded  Worsening shortness of breath with bilateral lower extremity edema  Chest x-ray-pulmonary venous congestion  BNP-174, in obese patient  Echo 08/23-preserved EF, diastolic dysfunction, right ventricular systolic pressure 61  Home regimen-Bumex 2 mg twice daily  Continue with IV Bumex 4 mg twice daily.  Cr stable, good urine output and wt decreased to 282 this AM (was 284)  Fluid restriction 1500 mL  Salt restriction  Strict I's and O's  Appreciate Cardiology input    Paroxysmal atrial fibrillation (HCC)  Assessment & Plan  Rate controlled with Toprol- mg daily  Does appear to have more elevated rates with ambulation/activity  Anticoagulated with Eliquis 5 mg twice daily    Hypercholesterolemia  Assessment & Plan  Continue with atorvastatin 40 mg daily at bedtime    Obstructive sleep apnea  Assessment & Plan  Uses CPAP at home  Follows with pulmonology on outpatient, awaiting BiPAP study with transcutaneous oxygen monitoring  Continue with BiPAP while inpatient    Esophageal reflux  Assessment & Plan  Continue with Protonix         VTE Pharmacologic Prophylaxis: VTE Score: 8 High Risk (Score >/= 5) - Pharmacological DVT Prophylaxis Ordered: apixaban (Eliquis). Sequential Compression Devices Ordered.    Mobility:   Basic Mobility Inpatient Raw Score: 23  JH-HLM Goal: 7: Walk 25 feet or more  JH-HLM Achieved: 7: Walk  25 feet or more  HLM Goal achieved. Continue to encourage appropriate mobility.    Patient Centered Rounds: I performed bedside rounds with nursing staff today.   Discussions with Specialists or Other Care Team Provider:     Education and Discussions with Family / Patient: Patient declined call to .     Total Time Spent on Date of Encounter in care of patient: 40 mins. This time was spent on one or more of the following: performing physical exam; counseling and coordination of care; obtaining or reviewing history; documenting in the medical record; reviewing/ordering tests, medications or procedures; communicating with other healthcare professionals and discussing with patient's family/caregivers.    Current Length of Stay: 6 day(s)  Current Patient Status: Inpatient   Certification Statement: The patient will continue to require additional inpatient hospital stay due to IV diuresis, monitoring creatinine  Discharge Plan: Anticipate discharge in 48 hrs to home.    Code Status: Level 1 - Full Code    Subjective:   Patient still notes shortness of breath particularly with exertion, improved from initial presentation.  Denies chest pain/palpitations, nausea/vomiting, abdominal pain, constipation or diarrhea.    Objective:     Vitals:   Temp (24hrs), Av.4 °F (36.9 °C), Min:98.4 °F (36.9 °C), Max:98.5 °F (36.9 °C)    Temp:  [98.4 °F (36.9 °C)-98.5 °F (36.9 °C)] 98.4 °F (36.9 °C)  HR:  [] 96  Resp:  [16-20] 16  BP: (112-120)/(75-76) 120/76  SpO2:  [90 %-99 %] 92 %  Body mass index is 49.99 kg/m².     Input and Output Summary (last 24 hours):     Intake/Output Summary (Last 24 hours) at 2024 0939  Last data filed at 2024 0937  Gross per 24 hour   Intake 1362 ml   Output 5950 ml   Net -4588 ml       Physical Exam:   Physical Exam  Vitals and nursing note reviewed.   Constitutional:       Appearance: Normal appearance.      Interventions: Nasal cannula in place.      Comments: No acute  distress   HENT:      Head: Normocephalic.   Eyes:      General: No scleral icterus.     Extraocular Movements: Extraocular movements intact.      Conjunctiva/sclera: Conjunctivae normal.   Cardiovascular:      Rate and Rhythm: Tachycardia present. Rhythm irregularly irregular.   Pulmonary:      Breath sounds: Decreased breath sounds present. No wheezing, rhonchi or rales.   Abdominal:      General: Bowel sounds are normal.      Palpations: Abdomen is soft.      Tenderness: There is no abdominal tenderness. There is no guarding or rebound.   Musculoskeletal:         General: No swelling, tenderness or deformity.      Cervical back: Normal range of motion.      Comments: Able to move upper/lower ext bilaterally.  1+ pitting edema LE bilaterally   Skin:     General: Skin is warm and dry.   Neurological:      Mental Status: She is alert and oriented to person, place, and time.   Psychiatric:         Mood and Affect: Mood normal.         Speech: Speech normal.         Behavior: Behavior normal.          Additional Data:     Labs:  Results from last 7 days   Lab Units 01/27/24  0506   WBC Thousand/uL 10.61*   HEMOGLOBIN g/dL 9.0*   HEMATOCRIT % 31.9*   PLATELETS Thousands/uL 281   NEUTROS PCT % 75   LYMPHS PCT % 16   MONOS PCT % 8   EOS PCT % 0     Results from last 7 days   Lab Units 01/28/24  0252 01/22/24  1539 01/22/24  1534   SODIUM mmol/L 138   < > 140   POTASSIUM mmol/L 3.7   < > 3.3*   CHLORIDE mmol/L 91*   < > 92*   CO2 mmol/L 41*   < > 36*   CO2, I-STAT   --    < >  --    BUN mg/dL 17   < > 17   CREATININE mg/dL 0.64   < > 1.32*   ANION GAP mmol/L 6   < > 12   CALCIUM mg/dL 8.3*   < > 8.3*   ALBUMIN g/dL  --   --  4.1   TOTAL BILIRUBIN mg/dL  --   --  0.64   ALK PHOS U/L  --   --  140*   ALT U/L  --   --  22   AST U/L  --   --  22   GLUCOSE RANDOM mg/dL 175*   < > 97    < > = values in this interval not displayed.         Results from last 7 days   Lab Units 01/28/24  0813 01/27/24  2143 01/27/24  1614  01/27/24  1136 01/27/24  0727 01/26/24  2120 01/26/24  1709 01/26/24  1131 01/26/24  0740 01/25/24 2010 01/25/24  1621 01/25/24  1119   POC GLUCOSE mg/dl 220* 161* 216* 245* 139 209* 180* 208* 134 189* 176* 130     Results from last 7 days   Lab Units 01/22/24  1534   HEMOGLOBIN A1C % 8.5*           Lines/Drains:  Invasive Devices       Peripheral Intravenous Line  Duration             Peripheral IV 01/26/24 Dorsal (posterior);Left Hand 2 days                      Telemetry:  Telemetry Orders (From admission, onward)               24 Hour Telemetry Monitoring  Continuous x 24 Hours (Telem)        Expiring   Question:  Reason for 24 Hour Telemetry  Answer:  Decompensated CHF- and any one of the following: continuous diuretic infusion or total diuretic dose >200 mg daily, associated electrolyte derangement (I.e. K < 3.0), ionotropic drip (continuous infusion), hx of ventricular arrhythmia, or new EF < 35%                     Telemetry Reviewed: Atrial fibrillation. HR averaging 100s  Indication for Continued Telemetry Use: Arrthymias requiring medical therapy             Imaging: No pertinent imaging reviewed.    Recent Cultures (last 7 days):         Last 24 Hours Medication List:   Current Facility-Administered Medications   Medication Dose Route Frequency Provider Last Rate    acetaminophen  650 mg Oral Q6H PRN Rosamaria Miller PA-C      albuterol  2 puff Inhalation Q4H PRN Hawa Marsh MD      apixaban  5 mg Oral BID Hawa Marsh MD      atorvastatin  40 mg Oral Daily With Dinner Hawa Marsh MD      budesonide  0.5 mg Nebulization Q12H Hawa Marsh MD      bumetanide  4 mg Intravenous BID Hawa Marsh MD      docusate sodium  100 mg Oral BID Hawa Marsh MD      Empagliflozin  10 mg Oral Daily Chuck Harrell PA-C      escitalopram  20 mg Oral Daily Hawa Marsh MD      insulin glargine  30 Units Subcutaneous HS Hawa Marsh MD      insulin lispro  1-5 Units Subcutaneous TID AC Hawa Marsh MD      insulin lispro  1-5 Units  Subcutaneous HS Hawa Marsh MD      insulin lispro  10 Units Subcutaneous TID With Meals Rupal Mercedes PA-C      loratadine  10 mg Oral Daily Hawa Marsh MD      metoprolol succinate  100 mg Oral Daily Hawa Marsh MD      montelukast  10 mg Oral HS Hawa Marsh MD      pantoprazole  40 mg Oral Daily Before Breakfast Hawa Marsh MD      potassium chloride  40 mEq Oral Daily Hawa Marsh MD      traZODone  150 mg Oral HS Hawa Marsh MD          Today, Patient Was Seen By: Rupal Mercedes PA-C    **Please Note: This note may have been constructed using a voice recognition system.**

## 2024-01-28 NOTE — ASSESSMENT & PLAN NOTE
Recent Labs     01/26/24  0410 01/27/24  0506 01/28/24  0252   CREATININE 0.63 0.58* 0.64   EGFR 94 97 93     Estimated Creatinine Clearance: 114.3 mL/min (by C-G formula based on SCr of 0.64 mg/dL).     Baseline creatinine-0.5-0.7  Etiology-prerenal azotemia in setting of volume overload/acute diastolic heart failure  Continue with IV Bumex  Creatinine stable  Urinary retention protocol  Avoid nephrotoxic agents  Monitor BMP daily

## 2024-01-28 NOTE — ASSESSMENT & PLAN NOTE
Rate controlled with Toprol- mg daily  Does appear to have more elevated rates with ambulation/activity  Anticoagulated with Eliquis 5 mg twice daily

## 2024-01-28 NOTE — ASSESSMENT & PLAN NOTE
Wt Readings from Last 3 Encounters:   01/28/24 128 kg (282 lb 3.2 oz)   11/09/23 129 kg (284 lb 12.8 oz)   09/20/23 128 kg (283 lb)     Appears volume overloaded  Worsening shortness of breath with bilateral lower extremity edema  Chest x-ray-pulmonary venous congestion  BNP-174, in obese patient  Echo 08/23-preserved EF, diastolic dysfunction, right ventricular systolic pressure 61  Home regimen-Bumex 2 mg twice daily  Continue with IV Bumex 4 mg twice daily.  Cr stable, good urine output and wt decreased to 282 this AM (was 284)  Fluid restriction 1500 mL  Salt restriction  Strict I's and O's  Appreciate Cardiology input

## 2024-01-28 NOTE — PLAN OF CARE
Problem: CARDIOVASCULAR - ADULT  Goal: Maintains optimal cardiac output and hemodynamic stability  Description: INTERVENTIONS:  - Monitor I/O, vital signs and rhythm  - Monitor for S/S and trends of decreased cardiac output  - Administer and titrate ordered vasoactive medications to optimize hemodynamic stability  - Assess quality of pulses, skin color and temperature  - Assess for signs of decreased coronary artery perfusion  - Instruct patient to report change in severity of symptoms  Outcome: Progressing     Problem: RESPIRATORY - ADULT  Goal: Achieves optimal ventilation and oxygenation  Description: INTERVENTIONS:  - Assess for changes in respiratory status  - Assess for changes in mentation and behavior  - Position to facilitate oxygenation and minimize respiratory effort  - Oxygen administered by appropriate delivery if ordered  - Initiate smoking cessation education as indicated  - Encourage broncho-pulmonary hygiene including cough, deep breathe, Incentive Spirometry  - Assess the need for suctioning and aspirate as needed  - Assess and instruct to report SOB or any respiratory difficulty  - Respiratory Therapy support as indicated  Outcome: Progressing     Problem: METABOLIC, FLUID AND ELECTROLYTES - ADULT  Goal: Electrolytes maintained within normal limits  Description: INTERVENTIONS:  - Monitor labs and assess patient for signs and symptoms of electrolyte imbalances  - Administer electrolyte replacement as ordered  - Monitor response to electrolyte replacements, including repeat lab results as appropriate  - Instruct patient on fluid and nutrition as appropriate  Outcome: Progressing     Problem: METABOLIC, FLUID AND ELECTROLYTES - ADULT  Goal: Glucose maintained within target range  Description: INTERVENTIONS:  - Monitor Blood Glucose as ordered  - Assess for signs and symptoms of hyperglycemia and hypoglycemia  - Administer ordered medications to maintain glucose within target range  - Assess  nutritional intake and initiate nutrition service referral as needed  Outcome: Progressing

## 2024-01-29 LAB
ANION GAP SERPL CALCULATED.3IONS-SCNC: 6 MMOL/L
BUN SERPL-MCNC: 18 MG/DL (ref 5–25)
CALCIUM SERPL-MCNC: 8.4 MG/DL (ref 8.4–10.2)
CHLORIDE SERPL-SCNC: 92 MMOL/L (ref 96–108)
CO2 SERPL-SCNC: 41 MMOL/L (ref 21–32)
CREAT SERPL-MCNC: 0.68 MG/DL (ref 0.6–1.3)
GFR SERPL CREATININE-BSD FRML MDRD: 92 ML/MIN/1.73SQ M
GLUCOSE SERPL-MCNC: 143 MG/DL (ref 65–140)
GLUCOSE SERPL-MCNC: 171 MG/DL (ref 65–140)
GLUCOSE SERPL-MCNC: 179 MG/DL (ref 65–140)
GLUCOSE SERPL-MCNC: 197 MG/DL (ref 65–140)
POTASSIUM SERPL-SCNC: 3.6 MMOL/L (ref 3.5–5.3)
SODIUM SERPL-SCNC: 139 MMOL/L (ref 135–147)

## 2024-01-29 PROCEDURE — 94640 AIRWAY INHALATION TREATMENT: CPT

## 2024-01-29 PROCEDURE — 99232 SBSQ HOSP IP/OBS MODERATE 35: CPT | Performed by: PHYSICIAN ASSISTANT

## 2024-01-29 PROCEDURE — 94760 N-INVAS EAR/PLS OXIMETRY 1: CPT

## 2024-01-29 PROCEDURE — 82948 REAGENT STRIP/BLOOD GLUCOSE: CPT

## 2024-01-29 PROCEDURE — 99232 SBSQ HOSP IP/OBS MODERATE 35: CPT | Performed by: INTERNAL MEDICINE

## 2024-01-29 PROCEDURE — 94660 CPAP INITIATION&MGMT: CPT

## 2024-01-29 PROCEDURE — 94003 VENT MGMT INPAT SUBQ DAY: CPT

## 2024-01-29 PROCEDURE — 80048 BASIC METABOLIC PNL TOTAL CA: CPT | Performed by: INTERNAL MEDICINE

## 2024-01-29 RX ADMIN — BUDESONIDE 0.5 MG: 0.5 INHALANT ORAL at 20:25

## 2024-01-29 RX ADMIN — INSULIN LISPRO 1 UNITS: 100 INJECTION, SOLUTION INTRAVENOUS; SUBCUTANEOUS at 11:29

## 2024-01-29 RX ADMIN — INSULIN LISPRO 1 UNITS: 100 INJECTION, SOLUTION INTRAVENOUS; SUBCUTANEOUS at 23:02

## 2024-01-29 RX ADMIN — MONTELUKAST 10 MG: 10 TABLET, FILM COATED ORAL at 23:00

## 2024-01-29 RX ADMIN — EMPAGLIFLOZIN 10 MG: 10 TABLET, FILM COATED ORAL at 08:21

## 2024-01-29 RX ADMIN — LORATADINE 10 MG: 10 TABLET ORAL at 08:21

## 2024-01-29 RX ADMIN — DOCUSATE SODIUM 100 MG: 100 CAPSULE, LIQUID FILLED ORAL at 08:21

## 2024-01-29 RX ADMIN — APIXABAN 5 MG: 5 TABLET, FILM COATED ORAL at 08:21

## 2024-01-29 RX ADMIN — ACETAMINOPHEN 325MG 650 MG: 325 TABLET ORAL at 10:38

## 2024-01-29 RX ADMIN — POTASSIUM CHLORIDE 40 MEQ: 1500 TABLET, EXTENDED RELEASE ORAL at 08:21

## 2024-01-29 RX ADMIN — ACETAMINOPHEN 325MG 650 MG: 325 TABLET ORAL at 00:39

## 2024-01-29 RX ADMIN — TRAZODONE HYDROCHLORIDE 150 MG: 100 TABLET ORAL at 23:00

## 2024-01-29 RX ADMIN — BUMETANIDE 4 MG: 0.25 INJECTION INTRAMUSCULAR; INTRAVENOUS at 17:05

## 2024-01-29 RX ADMIN — APIXABAN 5 MG: 5 TABLET, FILM COATED ORAL at 17:05

## 2024-01-29 RX ADMIN — ACETAMINOPHEN 325MG 650 MG: 325 TABLET ORAL at 18:11

## 2024-01-29 RX ADMIN — ATORVASTATIN CALCIUM 40 MG: 40 TABLET, FILM COATED ORAL at 17:05

## 2024-01-29 RX ADMIN — PANTOPRAZOLE SODIUM 40 MG: 40 TABLET, DELAYED RELEASE ORAL at 07:09

## 2024-01-29 RX ADMIN — INSULIN LISPRO 10 UNITS: 100 INJECTION, SOLUTION INTRAVENOUS; SUBCUTANEOUS at 08:20

## 2024-01-29 RX ADMIN — METOPROLOL SUCCINATE 100 MG: 50 TABLET, EXTENDED RELEASE ORAL at 08:21

## 2024-01-29 RX ADMIN — INSULIN LISPRO 10 UNITS: 100 INJECTION, SOLUTION INTRAVENOUS; SUBCUTANEOUS at 17:05

## 2024-01-29 RX ADMIN — INSULIN GLARGINE 30 UNITS: 100 INJECTION, SOLUTION SUBCUTANEOUS at 23:07

## 2024-01-29 RX ADMIN — INSULIN LISPRO 1 UNITS: 100 INJECTION, SOLUTION INTRAVENOUS; SUBCUTANEOUS at 17:12

## 2024-01-29 RX ADMIN — ESCITALOPRAM OXALATE 20 MG: 20 TABLET ORAL at 08:21

## 2024-01-29 RX ADMIN — BUDESONIDE 0.5 MG: 0.5 INHALANT ORAL at 07:42

## 2024-01-29 RX ADMIN — DOCUSATE SODIUM 100 MG: 100 CAPSULE, LIQUID FILLED ORAL at 17:05

## 2024-01-29 RX ADMIN — INSULIN LISPRO 10 UNITS: 100 INJECTION, SOLUTION INTRAVENOUS; SUBCUTANEOUS at 11:29

## 2024-01-29 RX ADMIN — BUMETANIDE 4 MG: 0.25 INJECTION INTRAMUSCULAR; INTRAVENOUS at 08:21

## 2024-01-29 NOTE — PLAN OF CARE
Problem: DISCHARGE PLANNING  Goal: Discharge to home or other facility with appropriate resources  Description: INTERVENTIONS:  - Identify barriers to discharge w/patient and caregiver  - Arrange for needed discharge resources and transportation as appropriate  - Identify discharge learning needs (meds, wound care, etc.)  - Arrange for interpretive services to assist at discharge as needed  - Refer to Case Management Department for coordinating discharge planning if the patient needs post-hospital services based on physician/advanced practitioner order or complex needs related to functional status, cognitive ability, or social support system  Outcome: Progressing     Problem: Knowledge Deficit  Goal: Patient/family/caregiver demonstrates understanding of disease process, treatment plan, medications, and discharge instructions  Description: Complete learning assessment and assess knowledge base.  Interventions:  - Provide teaching at level of understanding  - Provide teaching via preferred learning methods  Outcome: Progressing     Problem: CARDIOVASCULAR - ADULT  Goal: Maintains optimal cardiac output and hemodynamic stability  Description: INTERVENTIONS:  - Monitor I/O, vital signs and rhythm  - Monitor for S/S and trends of decreased cardiac output  - Administer and titrate ordered vasoactive medications to optimize hemodynamic stability  - Assess quality of pulses, skin color and temperature  - Assess for signs of decreased coronary artery perfusion  - Instruct patient to report change in severity of symptoms  Outcome: Progressing  Goal: Absence of cardiac dysrhythmias or at baseline rhythm  Description: INTERVENTIONS:  - Continuous cardiac monitoring, vital signs, obtain 12 lead EKG if ordered  - Administer antiarrhythmic and heart rate control medications as ordered  - Monitor electrolytes and administer replacement therapy as ordered  Outcome: Progressing     Problem: RESPIRATORY - ADULT  Goal: Achieves  optimal ventilation and oxygenation  Description: INTERVENTIONS:  - Assess for changes in respiratory status  - Assess for changes in mentation and behavior  - Position to facilitate oxygenation and minimize respiratory effort  - Oxygen administered by appropriate delivery if ordered  - Initiate smoking cessation education as indicated  - Encourage broncho-pulmonary hygiene including cough, deep breathe, Incentive Spirometry  - Assess the need for suctioning and aspirate as needed  - Assess and instruct to report SOB or any respiratory difficulty  - Respiratory Therapy support as indicated  Outcome: Progressing

## 2024-01-29 NOTE — ASSESSMENT & PLAN NOTE
Recent Labs     01/27/24  0506 01/28/24  0252 01/29/24  0504   CREATININE 0.58* 0.64 0.68   EGFR 97 93 92     Estimated Creatinine Clearance: 106.5 mL/min (by C-G formula based on SCr of 0.68 mg/dL).     Baseline creatinine-0.5-0.7  Etiology-prerenal azotemia in setting of volume overload/acute diastolic heart failure  Continue with IV Bumex  Creatinine stable  Urinary retention protocol  Avoid nephrotoxic agents  Monitor BMP daily

## 2024-01-29 NOTE — ASSESSMENT & PLAN NOTE
Rate controlled with Toprol- mg daily  HR persistently elevated this AM  TT to cardiology re: possible adjustment of toprol  Anticoagulated with Eliquis 5 mg twice daily

## 2024-01-29 NOTE — PROGRESS NOTES
Progress Note - Cardiology   Mattie Joy 63 y.o. female MRN: [id number]  Unit/Bed#: -01 Encounter: [id number]        Problem List:  Principal Problem:    Acute on chronic hypercapnic respiratory failure (HCC)  Active Problems:    Esophageal reflux    Obstructive sleep apnea    Hypercholesterolemia    Type 2 diabetes mellitus with stage 2 chronic kidney disease, with long-term current use of insulin (HCC)    Paroxysmal atrial fibrillation (HCC)    Pulmonary hypertension (HCC)    Morbid obesity (HCC)    Eosinophilic asthma    COPD, severe (HCC)    IVELISSE (acute kidney injury) (HCC)    Acute on chronic heart failure with preserved ejection fraction (HCC)      Assessment:  Acute on chronic respiratory failure  Baseline oxygen requirements 4 L nasal cannula  Initially on admission patient noted to be hypoxic with saturations in the upper 80s while on 5 L nasal cannula, she has at maximum this admission thus far required 9-10 L but was able to wean down as of 1/23/2024  Etiology multifactorial in light of congestive heart failure as well as COPD exacerbation  Acute on chronic diastolic heart failure  8/15/2023 echo: LVEF 65%, grade 2 diastolic dysfunction, severely elevated RV systolic pressure 61 mmHg  Prehospital diuretic: Bumex 4 twice daily with 40 potassium daily  Prior weight as low as 278 in August 2023 at which time she was reportedly euvolemic  Paroxysmal atrial fibrillation  Patient in atrial fibrillation on arrival with rates in the low 100s  Thromboembolic PPx: Eliquis 5 twice daily  Rate control: Toprol- daily  COPD with acute exacerbation  Type 2 diabetes  Obesity, BMI 51  Pulmonary hypertension likely group 2/3 from left heart disease, underlying lung disease, OHV  Acute kidney injury, resolved    Plan/ Discussion:  Down 19L with stable renal fx and lytes  Continue IV diuresis for now   Continue Eliquis and metoprolol    Subjective:  Sleeping comfortably but awoke easily  Breathing is okay  On  CPAP    Vitals:  Vitals:    01/28/24 0934 01/29/24 0600   Weight: 128 kg (282 lb 3.2 oz) 126 kg (277 lb)   ,  Vitals:    01/28/24 2100 01/29/24 0600 01/29/24 0700 01/29/24 0742   BP:   127/73    Pulse:   95    Resp:       Temp:   98.3 °F (36.8 °C)    TempSrc:       SpO2: 98%  98% 98%   Weight:  126 kg (277 lb)     Height:           Exam:  General: Alert awake and oriented, no acute distress  Heart: Irregular, rate controlled, no murmurs, Normal S1, no edema    Respiratory effort/ Lungs:  Breathing comfortably on CPAP, clear bilaterally without wheezing, rales, crackles   Abdominal: Non-tender to palpation, + bowel sounds, soft, no masses or distension  Lower Limbs:  No edema            Telemetry:       Atrial fibrillation, Heart Rate 90's    Medications:    Current Facility-Administered Medications:     acetaminophen (TYLENOL) tablet 650 mg, 650 mg, Oral, Q6H PRN, Rosamaria Miller PA-C, 650 mg at 01/29/24 1038    albuterol (PROVENTIL HFA,VENTOLIN HFA) inhaler 2 puff, 2 puff, Inhalation, Q4H PRN, Hawa Marsh MD, 2 puff at 01/23/24 1520    apixaban (ELIQUIS) tablet 5 mg, 5 mg, Oral, BID, Hawa Marsh MD, 5 mg at 01/29/24 0821    atorvastatin (LIPITOR) tablet 40 mg, 40 mg, Oral, Daily With Dinner, Hawa Marsh MD, 40 mg at 01/28/24 1724    budesonide (PULMICORT) inhalation solution 0.5 mg, 0.5 mg, Nebulization, Q12H, Hawa Marsh MD, 0.5 mg at 01/29/24 0742    bumetanide (BUMEX) injection 4 mg, 4 mg, Intravenous, BID, Hawa Marsh MD, 4 mg at 01/29/24 0821    docusate sodium (COLACE) capsule 100 mg, 100 mg, Oral, BID, Hawa Marsh MD, 100 mg at 01/29/24 0821    Empagliflozin (JARDIANCE) tablet 10 mg, 10 mg, Oral, Daily, Chuck Harrell PA-C, 10 mg at 01/29/24 0821    escitalopram (LEXAPRO) tablet 20 mg, 20 mg, Oral, Daily, Hawa Marsh MD, 20 mg at 01/29/24 0821    insulin glargine (LANTUS) subcutaneous injection 30 Units 0.3 mL, 30 Units, Subcutaneous, HS, Hawa Marsh MD, 30 Units at 01/28/24 2211    insulin lispro (HumaLOG)  100 units/mL subcutaneous injection 1-5 Units, 1-5 Units, Subcutaneous, TID AC, 1 Units at 01/29/24 1129 **AND** Fingerstick Glucose (POCT), , , TID AC, Hawa Marsh MD    insulin lispro (HumaLOG) 100 units/mL subcutaneous injection 1-5 Units, 1-5 Units, Subcutaneous, HS, Hawa Marsh MD, 1 Units at 01/28/24 2219    insulin lispro (HumaLOG) 100 units/mL subcutaneous injection 10 Units, 10 Units, Subcutaneous, TID With Meals, Rupal Mercedes PA-C, 10 Units at 01/29/24 1129    loratadine (CLARITIN) tablet 10 mg, 10 mg, Oral, Daily, Hawa Marsh MD, 10 mg at 01/29/24 0821    metoprolol succinate (TOPROL-XL) 24 hr tablet 100 mg, 100 mg, Oral, Daily, Hawa Marsh MD, 100 mg at 01/29/24 0821    montelukast (SINGULAIR) tablet 10 mg, 10 mg, Oral, HS, Hawa Marsh MD, 10 mg at 01/28/24 2210    pantoprazole (PROTONIX) EC tablet 40 mg, 40 mg, Oral, Daily Before Breakfast, Hawa Marsh MD, 40 mg at 01/29/24 0709    potassium chloride (K-DUR,KLOR-CON) CR tablet 40 mEq, 40 mEq, Oral, Daily, Hawa Marsh MD, 40 mEq at 01/29/24 0821    traZODone (DESYREL) tablet 150 mg, 150 mg, Oral, HS, Hawa Marsh MD, 150 mg at 01/28/24 2210      Labs/Data:        Results from last 7 days   Lab Units 01/27/24  0506 01/26/24  0410 01/25/24  0222   WBC Thousand/uL 10.61* 10.19* 11.16*   HEMOGLOBIN g/dL 9.0* 9.1* 9.6*   HEMATOCRIT % 31.9* 32.4* 34.7*   PLATELETS Thousands/uL 281 298 320     Results from last 7 days   Lab Units 01/29/24  0504 01/28/24  0252 01/27/24  0506   POTASSIUM mmol/L 3.6 3.7 3.6   CHLORIDE mmol/L 92* 91* 91*   CO2 mmol/L 41* 41* 42*   BUN mg/dL 18 17 17   CREATININE mg/dL 0.68 0.64 0.58*

## 2024-01-29 NOTE — ASSESSMENT & PLAN NOTE
Wt Readings from Last 3 Encounters:   01/29/24 126 kg (277 lb)   11/09/23 129 kg (284 lb 12.8 oz)   09/20/23 128 kg (283 lb)     Appears volume overloaded  Worsening shortness of breath with bilateral lower extremity edema  Chest x-ray-pulmonary venous congestion  BNP-174, in obese patient  Echo 08/23-preserved EF, diastolic dysfunction, right ventricular systolic pressure 61  Home regimen-Bumex 2 mg twice daily  Continue with IV Bumex 4 mg twice daily.  Cr stable, good urine output and wt decreased to 277  Fluid restriction 1500 mL  Unfortunately patient has been noncompliant with both fluid and salt restriction  Salt restriction  Strict I's and O's  Appreciate Cardiology input

## 2024-01-29 NOTE — APP STUDENT NOTE
"MAR[facility] STUDENT  Inpatient Progress Note for TRAINING ONLY  Not Part of Legal Medical Record       Progress Note - Mattie Joy 63 y.o. female MRN: [id number]    Unit/Bed#: -Joe Encounter: [id number]      Assessment:  Acute on Chronic hypercapnic respiratory failure - improving  CHF with preserved ejection fraction - improving  COPD, severe - stable  Paroxysmal Atrial Fibrillation - stable  Type 2 Diabetes Mellitus - stable  Hypercholesterolemia - stable  Obstructive Sleep Apnea - stable  Eosinophilic asthma - stable  GERD - Stable    Plan:  Continue diuresis with IV Bumex 4 mg BID. Continue Toprol- mg, Jardiance 10 mg, Eliquis 5 mg, Lipitor 40 mg, Lexipro 20 mg, Claritin 10 mg, Singulair 10 mg, Protonix 40 mg, trazodone 150 mg daily. Continue to monitor CMP, CBC daily weights and strict I/O. Continue 1500 ml fluid restriction with 2 gram sodium restriction.     Subjective:   PK is a 64 yo Female with PMH of CHF, COPD, DM, and Afib on hospital day 7 admitted for acute on chronic hypercapnic respiratory failure. Her BMP on arrival was 174 and CXR was consistent with pulmonary venous congestion. She was started on IV Bumex 4mg BID. She was placed on a 2 gram sodium and 1500 ml fluid restriction that she notably has not adhered to. She required 5L nasal cannula on admission, but has weaned to 4L which is her baseline.  Objective:     Vitals: Blood pressure 127/73, pulse 95, temperature 98.3 °F (36.8 °C), resp. rate 15, height 5' 3\" (1.6 m), weight 126 kg (277 lb), SpO2 98%, not currently breastfeeding.,Body mass index is 49.07 kg/m².      Intake/Output Summary (Last 24 hours) at 1/29/2024 1242  Last data filed at 1/29/2024 1101  Gross per 24 hour   Intake 2442 ml   Output 4100 ml   Net -1658 ml       Physical Exam: /73   Pulse 95   Temp 98.3 °F (36.8 °C)   Resp 15   Ht 5' 3\" (1.6 m)   Wt 126 kg (277 lb)   SpO2 98%   BMI 49.07 kg/m²   General appearance: alert and oriented, in no acute " distress  Lungs: diminished breath sounds  Heart: irregularly irregular rhythm  Abdomen: soft, non-tender; bowel sounds normal; no masses,  no organomegaly  Extremities: edema 1+ bilateral lower extremities      Invasive Devices       Peripheral Intravenous Line  Duration             Peripheral IV 01/26/24 Dorsal (posterior);Left Hand 3 days                    Lab, Imaging and other studies: {Results Review Statement:33456}  VTE Pharmacologic Prophylaxis: {Pharmacologic VTE Prophylaxis:278271964}  VTE Mechanical Prophylaxis: {Mechanical VTE Prophylaxis:00912}

## 2024-01-29 NOTE — ASSESSMENT & PLAN NOTE
Lab Results   Component Value Date    HGBA1C 8.5 (H) 01/22/2024       Recent Labs     01/28/24  1632 01/28/24  2210 01/29/24  0748 01/29/24  1121   POCGLU 220* 166* 143* 197*       Blood Sugar Average: Last 72 hrs:  (P) 185.9982450411240648  Currently uncontrolled, as per recent A1c  Home regimen consists of glargine 30 units at bedtime, insulin lispro 30 units with breakfast, 15 units with lunch and dinner, metformin 500 mg twice daily and Ozempic weekly  Continue with insulin glargine 30 units at bedtime  Increased humalog to 10 units TID on 1/25  Additional coverage with sliding scale  Adjust regimen as needed  Carb consistent diet - unfortunately largely noncompliant.  Per nursing staff frequently asking for various snacks throughout the day  Frequent Accu-Cheks and hypoglycemia protocol

## 2024-01-29 NOTE — NURSING NOTE
Pt is non-compliant with diet and fluid restriction. She is constantly asking for high sodium snacks such as pretzels and gold fish. She also admitted that she fills her water cup from the sink. This RN had multiple conversations about the importance of being compliant with diet and fluids restrictions. She verbalized of understanding.

## 2024-01-29 NOTE — PROGRESS NOTES
[facility]  Progress Note  Name: Mattie Joy I  MRN: [id number]  Unit/Bed#: -01 I Date of Admission: 1/22/2024   Date of Service: 1/29/2024 I Hospital Day: 7    Assessment/Plan   * Acute on chronic hypercapnic respiratory failure (HCC)  Assessment & Plan  Baseline oxygen requirement 4 L continuously  Underlying severe COPD, severe pulmonary hypertension and diastolic heart failure  BNP-174  COVID/respiratory panel-negative  Chest x-ray consistent with pulmonary venous congestion, final reading pending  IV Solu-Medrol discontinued  Continue with IV Bumex 4 mg BID  Continue with telemetry  Appreciate Cardiology and Pulmonology recommendations  Respiratory protocol  Monitor volume status  Strict I's and O's  Fluid restriction  Weaned to baseline of 4 L nasal cannula    IVELISSE (acute kidney injury) (HCC)  Assessment & Plan  Recent Labs     01/27/24  0506 01/28/24  0252 01/29/24  0504   CREATININE 0.58* 0.64 0.68   EGFR 97 93 92     Estimated Creatinine Clearance: 106.5 mL/min (by C-G formula based on SCr of 0.68 mg/dL).     Baseline creatinine-0.5-0.7  Etiology-prerenal azotemia in setting of volume overload/acute diastolic heart failure  Continue with IV Bumex  Creatinine stable  Urinary retention protocol  Avoid nephrotoxic agents  Monitor BMP daily    COPD, severe (HCC)  Assessment & Plan  Home regimen consists of benralizumab, Singulair, Pulmicort nebulization twice daily, Xopenex and albuterol as needed, Bevespi twice daily  Was seen at pulmonology office on 1/22, had respiratory distress, was given prednisone taper and urged to go to ED for evaluation  Status post IV Solu-Medrol 125 mg in ED  IV Solu-Medrol 40mg discontinued  Respiratory protocol  Continue with Pulmicort nebulization twice daily  Albuterol and Xopenex as needed  Singulair 10 mg daily  Inpatient consult to Pulmonology -no further inpatient recommendations      Eosinophilic asthma  Assessment & Plan  On benralizumab  every 8 weeks  Follows with Pulmonology    Morbid obesity (HCC)  Assessment & Plan  Counseling about adaptation of healthy dietary habits and lifestyle modifications, once medically stable  BMI >50  Affects all aspects of care    Pulmonary hypertension (HCC)  Assessment & Plan  As per recent echo in 08/23, right ventricular systolic pressure is severely elevated at 61 mmHg  Continue with IV Bumex  Continue to monitor volume status clinically    Acute on chronic heart failure with preserved ejection fraction (HCC)  Assessment & Plan  Wt Readings from Last 3 Encounters:   01/29/24 126 kg (277 lb)   11/09/23 129 kg (284 lb 12.8 oz)   09/20/23 128 kg (283 lb)     Appears volume overloaded  Worsening shortness of breath with bilateral lower extremity edema  Chest x-ray-pulmonary venous congestion  BNP-174, in obese patient  Echo 08/23-preserved EF, diastolic dysfunction, right ventricular systolic pressure 61  Home regimen-Bumex 2 mg twice daily  Continue with IV Bumex 4 mg twice daily.  Cr stable, good urine output and wt decreased to 277  Fluid restriction 1500 mL  Unfortunately patient has been noncompliant with both fluid and salt restriction  Salt restriction  Strict I's and O's  Appreciate Cardiology input    Paroxysmal atrial fibrillation (HCC)  Assessment & Plan  Rate controlled with Toprol- mg daily  HR persistently elevated this AM  TT to cardiology re: possible adjustment of toprol  Anticoagulated with Eliquis 5 mg twice daily    Type 2 diabetes mellitus with stage 2 chronic kidney disease, with long-term current use of insulin ([facility])  Assessment & Plan  Lab Results   Component Value Date    HGBA1C 8.5 (H) 01/22/2024       Recent Labs     01/28/24  1632 01/28/24  2210 01/29/24  0748 01/29/24  1121   POCGLU 220* 166* 143* 197*       Blood Sugar Average: Last 72 hrs:  (P) [id number]  Currently uncontrolled, as per recent A1c  Home regimen consists of glargine 30 units at bedtime, insulin lispro 30  units with breakfast, 15 units with lunch and dinner, metformin 500 mg twice daily and Ozempic weekly  Continue with insulin glargine 30 units at bedtime  Increased humalog to 10 units TID on 1/25  Additional coverage with sliding scale  Adjust regimen as needed  Carb consistent diet - unfortunately largely noncompliant.  Per nursing staff frequently asking for various snacks throughout the day  Frequent Accu-Cheks and hypoglycemia protocol    Hypercholesterolemia  Assessment & Plan  Continue with atorvastatin 40 mg daily at bedtime    Obstructive sleep apnea  Assessment & Plan  Uses CPAP at home  Follows with pulmonology on outpatient, awaiting BiPAP study with transcutaneous oxygen monitoring  Continue with BiPAP while inpatient    Esophageal reflux  Assessment & Plan  Continue with Protonix         VTE Pharmacologic Prophylaxis: VTE Score: 8 High Risk (Score >/= 5) - Pharmacological DVT Prophylaxis Ordered: apixaban (Eliquis). Sequential Compression Devices Ordered.    Mobility:   Basic Mobility Inpatient Raw Score: 23  JH-HLM Goal: 7: Walk 25 feet or more  JH-HLM Achieved: 7: Walk 25 feet or more  HLM Goal achieved. Continue to encourage appropriate mobility.    Patient Centered Rounds: I performed bedside rounds with nursing staff today.   Discussions with Specialists or Other Care Team Provider: POLO TT to cardiology AP    Education and Discussions with Family / Patient: Patient declined call to .     Total Time Spent on Date of Encounter in care of patient: 40 mins. This time was spent on one or more of the following: performing physical exam; counseling and coordination of care; obtaining or reviewing history; documenting in the medical record; reviewing/ordering tests, medications or procedures; communicating with other healthcare professionals and discussing with patient's family/caregivers.    Current Length of Stay: 7 day(s)  Current Patient Status: Inpatient   Certification Statement: The  patient will continue to require additional inpatient hospital stay due to IV diuresis  Discharge Plan: Anticipate discharge in 48 hrs to home with home services.    Code Status: Level 1 - Full Code    Subjective:   Patient states she still feels very short of breath on exertion.  Denies chest pain/palpitations, nausea/vomiting, abdominal pain.  Admits to frontal headache this morning.  Requesting Tylenol.  Denies visual changes, numbness/tingling, dizziness or lightheadedness.  Discussed at length re: fluid and salt restriction.  Patient admits she had been filling up her cup from the sink, possibly at least twice daily.    Objective:     Vitals:   Temp (24hrs), Av.3 °F (36.8 °C), Min:98.2 °F (36.8 °C), Max:98.4 °F (36.9 °C)    Temp:  [98.2 °F (36.8 °C)-98.4 °F (36.9 °C)] 98.3 °F (36.8 °C)  HR:  [] 95  Resp:  [15] 15  BP: (101-127)/(68-77) 127/73  SpO2:  [89 %-98 %] 98 %  Body mass index is 49.07 kg/m².     Input and Output Summary (last 24 hours):     Intake/Output Summary (Last 24 hours) at 2024 1210  Last data filed at 2024 1101  Gross per 24 hour   Intake 2442 ml   Output 4100 ml   Net -1658 ml       Physical Exam:   Physical Exam  Vitals and nursing note reviewed.   Constitutional:       Appearance: Normal appearance.      Interventions: Nasal cannula in place.      Comments: No acute distress   HENT:      Head: Normocephalic.   Eyes:      General: No scleral icterus.     Extraocular Movements: Extraocular movements intact.      Conjunctiva/sclera: Conjunctivae normal.   Cardiovascular:      Rate and Rhythm: Tachycardia present. Rhythm irregularly irregular.   Pulmonary:      Breath sounds: Decreased breath sounds present. No wheezing, rhonchi or rales.   Abdominal:      General: Bowel sounds are normal.      Palpations: Abdomen is soft.      Tenderness: There is no abdominal tenderness. There is no guarding or rebound.   Musculoskeletal:         General: No swelling, tenderness or  deformity.      Cervical back: Normal range of motion.      Comments: Able to move upper/lower ext bilaterally, 1+ pitting edema LE bilaterally   Skin:     General: Skin is warm and dry.   Neurological:      General: No focal deficit present.      Mental Status: She is alert and oriented to person, place, and time.   Psychiatric:         Mood and Affect: Mood normal.         Speech: Speech normal.         Behavior: Behavior normal.          Additional Data:     Labs:  Results from last 7 days   Lab Units 01/27/24  0506   WBC Thousand/uL 10.61*   HEMOGLOBIN g/dL 9.0*   HEMATOCRIT % 31.9*   PLATELETS Thousands/uL 281   NEUTROS PCT % 75   LYMPHS PCT % 16   MONOS PCT % 8   EOS PCT % 0     Results from last 7 days   Lab Units 01/29/24  0504 01/22/24  1539 01/22/24  1534   SODIUM mmol/L 139   < > 140   POTASSIUM mmol/L 3.6   < > 3.3*   CHLORIDE mmol/L 92*   < > 92*   CO2 mmol/L 41*   < > 36*   CO2, I-STAT   --    < >  --    BUN mg/dL 18   < > 17   CREATININE mg/dL 0.68   < > 1.32*   ANION GAP mmol/L 6   < > 12   CALCIUM mg/dL 8.4   < > 8.3*   ALBUMIN g/dL  --   --  4.1   TOTAL BILIRUBIN mg/dL  --   --  0.64   ALK PHOS U/L  --   --  140*   ALT U/L  --   --  22   AST U/L  --   --  22   GLUCOSE RANDOM mg/dL 179*   < > 97    < > = values in this interval not displayed.         Results from last 7 days   Lab Units 01/29/24  1121 01/29/24  0748 01/28/24  2210 01/28/24  1632 01/28/24  1140 01/28/24  0813 01/27/24  2143 01/27/24  1614 01/27/24  1136 01/27/24  0727 01/26/24  2120 01/26/24  1709   POC GLUCOSE mg/dl 197* 143* 166* 220* 158* 220* 161* 216* 245* 139 209* 180*     Results from last 7 days   Lab Units 01/22/24  1534   HEMOGLOBIN A1C % 8.5*           Lines/Drains:  Invasive Devices       Peripheral Intravenous Line  Duration             Peripheral IV 01/26/24 Dorsal (posterior);Left Hand 3 days                      Telemetry:  Telemetry Orders (From admission, onward)               24 Hour Telemetry Monitoring   Continuous x 24 Hours (Telem)        Question:  Reason for 24 Hour Telemetry  Answer:  Decompensated CHF- and any one of the following: continuous diuretic infusion or total diuretic dose >200 mg daily, associated electrolyte derangement (I.e. K < 3.0), ionotropic drip (continuous infusion), hx of ventricular arrhythmia, or new EF < 35%                     Telemetry Reviewed: Atrial fibrillation. HR averaging 110  Indication for Continued Telemetry Use: Acute CHF on >200 mg lasix/day or equivalent dose or with new reduced EF.              Imaging: No pertinent imaging reviewed.    Recent Cultures (last 7 days):         Last 24 Hours Medication List:   Current Facility-Administered Medications   Medication Dose Route Frequency Provider Last Rate    acetaminophen  650 mg Oral Q6H PRN Rosamaria Miller PA-C      albuterol  2 puff Inhalation Q4H PRN Hawa Marsh MD      apixaban  5 mg Oral BID Hawa Marsh MD      atorvastatin  40 mg Oral Daily With Dinner Hawa Marsh MD      budesonide  0.5 mg Nebulization Q12H Hawa Marsh MD      bumetanide  4 mg Intravenous BID Hawa Marsh MD      docusate sodium  100 mg Oral BID Hawa Marsh MD      Empagliflozin  10 mg Oral Daily Chuck Harrell PA-C      escitalopram  20 mg Oral Daily Hawa Marsh MD      insulin glargine  30 Units Subcutaneous HS Hawa Marsh MD      insulin lispro  1-5 Units Subcutaneous TID AC Hawa Marsh MD      insulin lispro  1-5 Units Subcutaneous HS Hawa Marsh MD      insulin lispro  10 Units Subcutaneous TID With Meals Rupal Mercedes PA-C      loratadine  10 mg Oral Daily Hawa Marsh MD      metoprolol succinate  100 mg Oral Daily Hawa Marsh MD      montelukast  10 mg Oral HS Hawa Marsh MD      pantoprazole  40 mg Oral Daily Before Breakfast Hawa Marsh MD      potassium chloride  40 mEq Oral Daily Hawa Marsh MD      traZODone  150 mg Oral HS Hawa Marsh MD          Today, Patient Was Seen By: Rupal Mercedes PA-C    **Please Note: This note may have  been constructed using a voice recognition system.**

## 2024-01-30 LAB
ANION GAP SERPL CALCULATED.3IONS-SCNC: 7 MMOL/L
BUN SERPL-MCNC: 18 MG/DL (ref 5–25)
CALCIUM SERPL-MCNC: 8.5 MG/DL (ref 8.4–10.2)
CHLORIDE SERPL-SCNC: 93 MMOL/L (ref 96–108)
CO2 SERPL-SCNC: 38 MMOL/L (ref 21–32)
CREAT SERPL-MCNC: 0.64 MG/DL (ref 0.6–1.3)
GFR SERPL CREATININE-BSD FRML MDRD: 93 ML/MIN/1.73SQ M
GLUCOSE SERPL-MCNC: 124 MG/DL (ref 65–140)
GLUCOSE SERPL-MCNC: 132 MG/DL (ref 65–140)
GLUCOSE SERPL-MCNC: 133 MG/DL (ref 65–140)
GLUCOSE SERPL-MCNC: 150 MG/DL (ref 65–140)
GLUCOSE SERPL-MCNC: 159 MG/DL (ref 65–140)
GLUCOSE SERPL-MCNC: 192 MG/DL (ref 65–140)
POTASSIUM SERPL-SCNC: 4.7 MMOL/L (ref 3.5–5.3)
SODIUM SERPL-SCNC: 138 MMOL/L (ref 135–147)

## 2024-01-30 PROCEDURE — 99232 SBSQ HOSP IP/OBS MODERATE 35: CPT | Performed by: PHYSICIAN ASSISTANT

## 2024-01-30 PROCEDURE — 80048 BASIC METABOLIC PNL TOTAL CA: CPT | Performed by: INTERNAL MEDICINE

## 2024-01-30 PROCEDURE — 94760 N-INVAS EAR/PLS OXIMETRY 1: CPT

## 2024-01-30 PROCEDURE — 82948 REAGENT STRIP/BLOOD GLUCOSE: CPT

## 2024-01-30 PROCEDURE — 94003 VENT MGMT INPAT SUBQ DAY: CPT

## 2024-01-30 PROCEDURE — 94640 AIRWAY INHALATION TREATMENT: CPT

## 2024-01-30 PROCEDURE — 99233 SBSQ HOSP IP/OBS HIGH 50: CPT | Performed by: INTERNAL MEDICINE

## 2024-01-30 RX ORDER — BUMETANIDE 0.25 MG/ML
0.5 INJECTION INTRAMUSCULAR; INTRAVENOUS CONTINUOUS
Status: DISCONTINUED | OUTPATIENT
Start: 2024-01-30 | End: 2024-02-04

## 2024-01-30 RX ORDER — BUMETANIDE 0.25 MG/ML
0.5 INJECTION INTRAMUSCULAR; INTRAVENOUS CONTINUOUS
Status: DISCONTINUED | OUTPATIENT
Start: 2024-01-30 | End: 2024-01-30

## 2024-01-30 RX ADMIN — INSULIN GLARGINE 30 UNITS: 100 INJECTION, SOLUTION SUBCUTANEOUS at 20:33

## 2024-01-30 RX ADMIN — MONTELUKAST 10 MG: 10 TABLET, FILM COATED ORAL at 20:33

## 2024-01-30 RX ADMIN — Medication 0.5 MG/HR: at 13:27

## 2024-01-30 RX ADMIN — DOCUSATE SODIUM 100 MG: 100 CAPSULE, LIQUID FILLED ORAL at 09:06

## 2024-01-30 RX ADMIN — Medication 0.5 MG/HR: at 20:36

## 2024-01-30 RX ADMIN — INSULIN LISPRO 1 UNITS: 100 INJECTION, SOLUTION INTRAVENOUS; SUBCUTANEOUS at 09:19

## 2024-01-30 RX ADMIN — ESCITALOPRAM OXALATE 20 MG: 20 TABLET ORAL at 09:06

## 2024-01-30 RX ADMIN — PANTOPRAZOLE SODIUM 40 MG: 40 TABLET, DELAYED RELEASE ORAL at 05:26

## 2024-01-30 RX ADMIN — INSULIN LISPRO 10 UNITS: 100 INJECTION, SOLUTION INTRAVENOUS; SUBCUTANEOUS at 13:15

## 2024-01-30 RX ADMIN — EMPAGLIFLOZIN 10 MG: 10 TABLET, FILM COATED ORAL at 09:07

## 2024-01-30 RX ADMIN — METOPROLOL SUCCINATE 100 MG: 50 TABLET, EXTENDED RELEASE ORAL at 09:07

## 2024-01-30 RX ADMIN — APIXABAN 5 MG: 5 TABLET, FILM COATED ORAL at 09:06

## 2024-01-30 RX ADMIN — INSULIN LISPRO 10 UNITS: 100 INJECTION, SOLUTION INTRAVENOUS; SUBCUTANEOUS at 17:16

## 2024-01-30 RX ADMIN — INSULIN LISPRO 10 UNITS: 100 INJECTION, SOLUTION INTRAVENOUS; SUBCUTANEOUS at 09:19

## 2024-01-30 RX ADMIN — TRAZODONE HYDROCHLORIDE 150 MG: 100 TABLET ORAL at 20:33

## 2024-01-30 RX ADMIN — INSULIN LISPRO 1 UNITS: 100 INJECTION, SOLUTION INTRAVENOUS; SUBCUTANEOUS at 20:35

## 2024-01-30 RX ADMIN — APIXABAN 5 MG: 5 TABLET, FILM COATED ORAL at 17:16

## 2024-01-30 RX ADMIN — BUMETANIDE 4 MG: 0.25 INJECTION INTRAMUSCULAR; INTRAVENOUS at 09:05

## 2024-01-30 RX ADMIN — DOCUSATE SODIUM 100 MG: 100 CAPSULE, LIQUID FILLED ORAL at 17:16

## 2024-01-30 RX ADMIN — BUDESONIDE 0.5 MG: 0.5 INHALANT ORAL at 09:11

## 2024-01-30 RX ADMIN — BUDESONIDE 0.5 MG: 0.5 INHALANT ORAL at 20:52

## 2024-01-30 RX ADMIN — LORATADINE 10 MG: 10 TABLET ORAL at 09:07

## 2024-01-30 RX ADMIN — ATORVASTATIN CALCIUM 40 MG: 40 TABLET, FILM COATED ORAL at 17:16

## 2024-01-30 RX ADMIN — POTASSIUM CHLORIDE 40 MEQ: 1500 TABLET, EXTENDED RELEASE ORAL at 09:07

## 2024-01-30 NOTE — ASSESSMENT & PLAN NOTE
Wt Readings from Last 3 Encounters:   01/30/24 126 kg (277 lb 12.8 oz)   11/09/23 129 kg (284 lb 12.8 oz)   09/20/23 128 kg (283 lb)     Appears volume overloaded  Worsening shortness of breath with bilateral lower extremity edema  Chest x-ray-pulmonary venous congestion  BNP-174, in obese patient  Echo 08/23-preserved EF, diastolic dysfunction, right ventricular systolic pressure 61  Home regimen-Bumex 2 mg twice daily  Continue with IV Bumex 4 mg twice daily.  Cr stable, good urine output and wt decreased to 277  Consider increase pending discussion with Cardiology  Fluid restriction 1500 mL  Unfortunately patient has been noncompliant with both fluid and salt restriction  Salt restriction  Strict I's and O's  Appreciate Cardiology input

## 2024-01-30 NOTE — ASSESSMENT & PLAN NOTE
Baseline oxygen requirement 4 L continuously  Underlying severe COPD, severe pulmonary hypertension and diastolic heart failure  BNP-174  COVID/respiratory panel-negative  Chest x-ray consistent with pulmonary venous congestion, final reading pending  IV Solu-Medrol discontinued  Continue with IV Bumex 4 mg BID - adjust pending further Cardiology recommendations  Continue with telemetry  Appreciate Cardiology and Pulmonology recommendations  Respiratory protocol  Monitor volume status  Strict I's and O's  Fluid restriction  Weaned to baseline of 4 L nasal cannula

## 2024-01-30 NOTE — ASSESSMENT & PLAN NOTE
Recent Labs     01/28/24  0252 01/29/24  0504 01/30/24  0527   CREATININE 0.64 0.68 0.64   EGFR 93 92 93       Estimated Creatinine Clearance: 113.2 mL/min (by C-G formula based on SCr of 0.64 mg/dL).     Baseline creatinine-0.5-0.7  Etiology-prerenal azotemia in setting of volume overload/acute diastolic heart failure  Continue with IV Bumex  Creatinine stable  Urinary retention protocol  Avoid nephrotoxic agents  Monitor BMP daily

## 2024-01-30 NOTE — ASSESSMENT & PLAN NOTE
As per recent echo in 08/23, right ventricular systolic pressure is severely elevated at 61 mmHg  Continue with IV Bumex 4mg BID - consider initiating drip vs increasing dose  Continue to monitor volume status clinically

## 2024-01-30 NOTE — PROGRESS NOTES
[facility]  Progress Note  Name: Mattie Joy I  MRN: [id number]  Unit/Bed#: -01 I Date of Admission: 1/22/2024   Date of Service: 1/30/2024 I Hospital Day: 8    Assessment/Plan   * Acute on chronic hypercapnic respiratory failure (HCC)  Assessment & Plan  Baseline oxygen requirement 4 L continuously  Underlying severe COPD, severe pulmonary hypertension and diastolic heart failure  BNP-174  COVID/respiratory panel-negative  Chest x-ray consistent with pulmonary venous congestion, final reading pending  IV Solu-Medrol discontinued  Continue with IV Bumex 4 mg BID - adjust pending further Cardiology recommendations  Continue with telemetry  Appreciate Cardiology and Pulmonology recommendations  Respiratory protocol  Monitor volume status  Strict I's and O's  Fluid restriction  Weaned to baseline of 4 L nasal cannula    Acute on chronic heart failure with preserved ejection fraction (HCC)  Assessment & Plan  Wt Readings from Last 3 Encounters:   01/30/24 126 kg (277 lb 12.8 oz)   11/09/23 129 kg (284 lb 12.8 oz)   09/20/23 128 kg (283 lb)     Appears volume overloaded  Worsening shortness of breath with bilateral lower extremity edema  Chest x-ray-pulmonary venous congestion  BNP-174, in obese patient  Echo 08/23-preserved EF, diastolic dysfunction, right ventricular systolic pressure 61  Home regimen-Bumex 2 mg twice daily  Continue with IV Bumex 4 mg twice daily.  Cr stable, good urine output and wt decreased to 277  Consider increase pending discussion with Cardiology  Fluid restriction 1500 mL  Unfortunately patient has been noncompliant with both fluid and salt restriction  Salt restriction  Strict I's and O's  Appreciate Cardiology input    IVELISSE (acute kidney injury) ([facility])  Assessment & Plan  Recent Labs     01/28/24  0252 01/29/24  0504 01/30/24  0527   CREATININE 0.64 0.68 0.64   EGFR 93 92 93       Estimated Creatinine Clearance: 113.2 mL/min (by C-G formula based on SCr of  0.64 mg/dL).     Baseline creatinine-0.5-0.7  Etiology-prerenal azotemia in setting of volume overload/acute diastolic heart failure  Continue with IV Bumex  Creatinine stable  Urinary retention protocol  Avoid nephrotoxic agents  Monitor BMP daily    COPD, severe (HCC)  Assessment & Plan  Home regimen consists of benralizumab, Singulair, Pulmicort nebulization twice daily, Xopenex and albuterol as needed, Bevespi twice daily  Was seen at pulmonology office on 1/22, had respiratory distress, was given prednisone taper and urged to go to ED for evaluation  Status post IV Solu-Medrol 125 mg in ED  IV Solu-Medrol 40mg discontinued  Respiratory protocol  Continue with Pulmicort nebulization twice daily  Albuterol and Xopenex as needed  Singulair 10 mg daily  Inpatient consult to Pulmonology -no further inpatient recommendations      Eosinophilic asthma  Assessment & Plan  On benralizumab every 8 weeks  Follows with Pulmonology    Morbid obesity (HCC)  Assessment & Plan  Counseling about adaptation of healthy dietary habits and lifestyle modifications, once medically stable  BMI >50  Affects all aspects of care    Pulmonary hypertension (HCC)  Assessment & Plan  As per recent echo in 08/23, right ventricular systolic pressure is severely elevated at 61 mmHg  Continue with IV Bumex 4mg BID - consider initiating drip vs increasing dose  Continue to monitor volume status clinically    Paroxysmal atrial fibrillation (HCC)  Assessment & Plan  Rate controlled with Toprol- mg daily  Anticoagulated with Eliquis 5 mg twice daily    Type 2 diabetes mellitus with stage 2 chronic kidney disease, with long-term current use of insulin (HCC)  Assessment & Plan  Lab Results   Component Value Date    HGBA1C 8.5 (H) 01/22/2024       Recent Labs     01/29/24  1121 01/29/24  1710 01/29/24  2300 01/30/24  0723   POCGLU 197* 171* 192* 150*         Blood Sugar Average: Last 72 hrs:  (P) 182.5262761005722494  Currently uncontrolled, as  per recent A1c  Home regimen consists of glargine 30 units at bedtime, insulin lispro 30 units with breakfast, 15 units with lunch and dinner, metformin 500 mg twice daily and Ozempic weekly  Continue with insulin glargine 30 units at bedtime  Increased humalog to 10 units TID on 1/25  Additional coverage with sliding scale  Adjust regimen as needed  Carb consistent diet - unfortunately largely noncompliant.  Per nursing staff frequently asking for various snacks throughout the day  Frequent Accu-Cheks and hypoglycemia protocol    Hypercholesterolemia  Assessment & Plan  Continue with atorvastatin 40 mg daily at bedtime    Obstructive sleep apnea  Assessment & Plan  Uses CPAP at home  Follows with pulmonology on outpatient, awaiting BiPAP study with transcutaneous oxygen monitoring  Continue with BiPAP while inpatient    Esophageal reflux  Assessment & Plan  Continue with Protonix         VTE Pharmacologic Prophylaxis: VTE Score: 8 High Risk (Score >/= 5) - Pharmacological DVT Prophylaxis Ordered: apixaban (Eliquis). Sequential Compression Devices Ordered.    Mobility:   Basic Mobility Inpatient Raw Score: 24  JH-HLM Goal: 8: Walk 250 feet or more  JH-HLM Achieved: 7: Walk 25 feet or more  HLM Goal NOT achieved. Continue with multidisciplinary rounding and encourage appropriate mobility to improve upon HLM goals.    Patient Centered Rounds: I performed bedside rounds with nursing staff today.   Discussions with Specialists or Other Care Team Provider: Discussed with Cardiology AP, will discuss Bumex dose with attending to consider increase    Education and Discussions with Family / Patient: Patient declined call to .     Total Time Spent on Date of Encounter in care of patient: 35 mins. This time was spent on one or more of the following: performing physical exam; counseling and coordination of care; obtaining or reviewing history; documenting in the medical record; reviewing/ordering tests,  medications or procedures; communicating with other healthcare professionals and discussing with patient's family/caregivers.    Current Length of Stay: 8 day(s)  Current Patient Status: Inpatient   Certification Statement: The patient will continue to require additional inpatient hospital stay due to IV diuretics  Discharge Plan: Anticipate discharge in >72 hrs to home.    Code Status: Level 1 - Full Code    Subjective:   Patient feels well, is waking up, utilizing BiPAP this morning.  She offers no complaints.  She feels her edema and breathing has improved.    Objective:     Vitals:   Temp (24hrs), Av.1 °F (36.7 °C), Min:97.3 °F (36.3 °C), Max:99 °F (37.2 °C)    Temp:  [97.3 °F (36.3 °C)-99 °F (37.2 °C)] 97.3 °F (36.3 °C)  HR:  [] 82  Resp:  [16-18] 16  BP: (116-120)/(69-73) 118/73  SpO2:  [91 %-100 %] 100 %  Body mass index is 49.21 kg/m².     Input and Output Summary (last 24 hours):     Intake/Output Summary (Last 24 hours) at 2024 1027  Last data filed at 2024 0827  Gross per 24 hour   Intake 1380 ml   Output 5694 ml   Net -4314 ml       Physical Exam:   Physical Exam  Vitals and nursing note reviewed.   Constitutional:       General: She is not in acute distress.     Appearance: Normal appearance. She is well-developed. She is morbidly obese.   HENT:      Head: Normocephalic and atraumatic.   Eyes:      General: No scleral icterus.     Conjunctiva/sclera: Conjunctivae normal.   Cardiovascular:      Rate and Rhythm: Normal rate. Rhythm irregularly irregular.      Heart sounds: No murmur heard.  Pulmonary:      Effort: Pulmonary effort is normal.      Breath sounds: Rales present. No wheezing or rhonchi.   Abdominal:      General: There is no distension.      Palpations: Abdomen is soft.   Musculoskeletal:      Right lower leg: Edema present.      Left lower leg: Edema present.   Skin:     General: Skin is warm and dry.   Neurological:      General: No focal deficit present.      Mental  Status: She is alert.   Psychiatric:         Mood and Affect: Mood normal.        Additional Data:     Labs:  Results from last 7 days   Lab Units 01/27/24  0506   WBC Thousand/uL 10.61*   HEMOGLOBIN g/dL 9.0*   HEMATOCRIT % 31.9*   PLATELETS Thousands/uL 281   NEUTROS PCT % 75   LYMPHS PCT % 16   MONOS PCT % 8   EOS PCT % 0     Results from last 7 days   Lab Units 01/30/24  0527   SODIUM mmol/L 138   POTASSIUM mmol/L 4.7   CHLORIDE mmol/L 93*   CO2 mmol/L 38*   BUN mg/dL 18   CREATININE mg/dL 0.64   ANION GAP mmol/L 7   CALCIUM mg/dL 8.5   GLUCOSE RANDOM mg/dL 132         Results from last 7 days   Lab Units 01/30/24  0723 01/29/24  2300 01/29/24  1710 01/29/24  1121 01/29/24  0748 01/28/24  2210 01/28/24  1632 01/28/24  1140 01/28/24  0813 01/27/24  2143 01/27/24  1614 01/27/24  1136   POC GLUCOSE mg/dl 150* 192* 171* 197* 143* 166* 220* 158* 220* 161* 216* 245*               Lines/Drains:  Invasive Devices       Peripheral Intravenous Line  Duration             Peripheral IV 01/30/24 Dorsal (posterior);Left Forearm <1 day                      Telemetry:  Telemetry Orders (From admission, onward)               24 Hour Telemetry Monitoring  Continuous x 24 Hours (Telem)        Question:  Reason for 24 Hour Telemetry  Answer:  Decompensated CHF- and any one of the following: continuous diuretic infusion or total diuretic dose >200 mg daily, associated electrolyte derangement (I.e. K < 3.0), ionotropic drip (continuous infusion), hx of ventricular arrhythmia, or new EF < 35%                     Telemetry Reviewed: Atrial fibrillation. HR averaging 90s - 100s  Indication for Continued Telemetry Use: Arrthymias requiring medical therapy             Imaging: Reviewed radiology reports from this admission including: chest xray    Recent Cultures (last 7 days):         Last 24 Hours Medication List:   Current Facility-Administered Medications   Medication Dose Route Frequency Provider Last Rate    acetaminophen  650 mg  Oral Q6H PRN Rosamaria Miller PA-C      albuterol  2 puff Inhalation Q4H PRN Hawa Marsh MD      apixaban  5 mg Oral BID Hawa Marsh MD      atorvastatin  40 mg Oral Daily With Dinner Hawa Marsh MD      budesonide  0.5 mg Nebulization Q12H Hawa Marsh MD      bumetanide  4 mg Intravenous BID Hawa Marsh MD      docusate sodium  100 mg Oral BID Hawa Marsh MD      Empagliflozin  10 mg Oral Daily Chuck Harrell PA-C      escitalopram  20 mg Oral Daily Hawa Marsh MD      insulin glargine  30 Units Subcutaneous HS Hawa Marsh MD      insulin lispro  1-5 Units Subcutaneous TID AC Hawa Marsh MD      insulin lispro  1-5 Units Subcutaneous HS Hawa Marsh MD      insulin lispro  10 Units Subcutaneous TID With Meals Rupal Mercedes PA-C      loratadine  10 mg Oral Daily Hawa Marsh MD      metoprolol succinate  100 mg Oral Daily Hawa Marsh MD      montelukast  10 mg Oral HS Hawa Marsh MD      pantoprazole  40 mg Oral Daily Before Breakfast Hawa Marsh MD      potassium chloride  40 mEq Oral Daily Hawa Marsh MD      traZODone  150 mg Oral HS Hawa Marsh MD          Today, Patient Was Seen By: Kasey Richardson PA-C    **Please Note: This note may have been constructed using a voice recognition system.**

## 2024-01-30 NOTE — ASSESSMENT & PLAN NOTE
Lab Results   Component Value Date    HGBA1C 8.5 (H) 01/22/2024       Recent Labs     01/29/24  1121 01/29/24  1710 01/29/24  2300 01/30/24  0723   POCGLU 197* 171* 192* 150*         Blood Sugar Average: Last 72 hrs:  (P) 182.3443145945222945  Currently uncontrolled, as per recent A1c  Home regimen consists of glargine 30 units at bedtime, insulin lispro 30 units with breakfast, 15 units with lunch and dinner, metformin 500 mg twice daily and Ozempic weekly  Continue with insulin glargine 30 units at bedtime  Increased humalog to 10 units TID on 1/25  Additional coverage with sliding scale  Adjust regimen as needed  Carb consistent diet - unfortunately largely noncompliant.  Per nursing staff frequently asking for various snacks throughout the day  Frequent Accu-Cheks and hypoglycemia protocol

## 2024-01-30 NOTE — PROGRESS NOTES
Progress Note - Cardiology   Mattie Joy 63 y.o. female MRN: [id number]  Unit/Bed#: -01 Encounter: [id number]        Problem List:  Principal Problem:    Acute on chronic hypercapnic respiratory failure (HCC)  Active Problems:    Esophageal reflux    Obstructive sleep apnea    Hypercholesterolemia    Type 2 diabetes mellitus with stage 2 chronic kidney disease, with long-term current use of insulin (HCC)    Paroxysmal atrial fibrillation (HCC)    Pulmonary hypertension (HCC)    Morbid obesity (HCC)    Eosinophilic asthma    COPD, severe (HCC)    IVELISSE (acute kidney injury) (HCC)    Acute on chronic heart failure with preserved ejection fraction (HCC)      Assessment:  Acute on chronic respiratory failure  Baseline oxygen requirements 4 L nasal cannula  Initially on admission patient noted to be hypoxic with saturations in the upper 80s while on 5 L nasal cannula, she has at maximum this admission thus far required 9-10 L but was able to wean down as of 1/23/2024  Etiology multifactorial in light of congestive heart failure as well as COPD exacerbation  Acute on chronic diastolic heart failure  8/15/2023 echo: LVEF 65%, grade 2 diastolic dysfunction, severely elevated RV systolic pressure 61 mmHg  Prehospital diuretic: Bumex 4 twice daily with 40 potassium daily  Prior weight as low as 278 in August 2023 at which time she was reportedly euvolemic  Paroxysmal atrial fibrillation  Patient in atrial fibrillation on arrival with rates in the low 100s  Thromboembolic PPx: Eliquis 5 twice daily  Rate control: Toprol- daily  COPD with acute exacerbation  Type 2 diabetes  Obesity, BMI 51  Pulmonary hypertension likely group 2/3 from left heart disease, underlying lung disease, OHV  Acute kidney injury, resolved    Plan/ Discussion:  Down 24 L with stable renal fx and electrolytes. Wt today is the same as yesterday so either inaccurate or I/O unable to be taken reliably. Pt drinking water from the faucet so this is  understandably difficult data to report  Continue IV bumex but consider either adding metolazone or starting a bumex gtt  Continue remainder of medications     Subjective:  Breathing about the same today compared to yesterday    Vitals:  Vitals:    01/29/24 0600 01/30/24 0521   Weight: 126 kg (277 lb) 126 kg (277 lb 12.8 oz)   ,  Vitals:    01/29/24 2100 01/29/24 2136 01/30/24 0521 01/30/24 0719   BP:  118/69  118/73   Pulse:  103  82   Resp:  16     Temp:  98.1 °F (36.7 °C)  (!) 97.3 °F (36.3 °C)   TempSrc:       SpO2: 98% 92%  100%   Weight:   126 kg (277 lb 12.8 oz)    Height:           Exam:  General: Alert awake and oriented, no acute distress  Heart:  Regular rate and rhythm, no murmurs, Normal S1, no edema    Respiratory effort/ Lungs:  Breathing comfortably on NC oxygen, clear bilaterally without wheezing, rales, crackles   Abdominal: Non-tender to palpation, + bowel sounds, soft, no masses or distension  Lower Limbs:  No edema            Telemetry:       Atrial fibrillation, Heart Rate low 100's    Medications:    Current Facility-Administered Medications:     acetaminophen (TYLENOL) tablet 650 mg, 650 mg, Oral, Q6H PRN, Rosamaria Miller PA-C, 650 mg at 01/29/24 1811    albuterol (PROVENTIL HFA,VENTOLIN HFA) inhaler 2 puff, 2 puff, Inhalation, Q4H PRN, Hawa Marsh MD, 2 puff at 01/23/24 1520    apixaban (ELIQUIS) tablet 5 mg, 5 mg, Oral, BID, Hawa Marsh MD, 5 mg at 01/30/24 0906    atorvastatin (LIPITOR) tablet 40 mg, 40 mg, Oral, Daily With Dinner, Hawa Marsh MD, 40 mg at 01/29/24 1705    budesonide (PULMICORT) inhalation solution 0.5 mg, 0.5 mg, Nebulization, Q12H, Hawa Marsh MD, 0.5 mg at 01/30/24 0911    bumetanide (BUMEX) injection 4 mg, 4 mg, Intravenous, BID, Hawa Marsh MD, 4 mg at 01/30/24 0905    docusate sodium (COLACE) capsule 100 mg, 100 mg, Oral, BID, Hawa Marsh MD, 100 mg at 01/30/24 0906    Empagliflozin (JARDIANCE) tablet 10 mg, 10 mg, Oral, Daily, Chuck Harrell PA-C, 10 mg at 01/30/24  0907    escitalopram (LEXAPRO) tablet 20 mg, 20 mg, Oral, Daily, Hawa Marsh MD, 20 mg at 01/30/24 0906    insulin glargine (LANTUS) subcutaneous injection 30 Units 0.3 mL, 30 Units, Subcutaneous, HS, Hawa Marsh MD, 30 Units at 01/29/24 2307    insulin lispro (HumaLOG) 100 units/mL subcutaneous injection 1-5 Units, 1-5 Units, Subcutaneous, TID AC, 1 Units at 01/30/24 0919 **AND** Fingerstick Glucose (POCT), , , TID AC, Hawa Marsh MD    insulin lispro (HumaLOG) 100 units/mL subcutaneous injection 1-5 Units, 1-5 Units, Subcutaneous, HS, Hawa Marsh MD, 1 Units at 01/29/24 2302    insulin lispro (HumaLOG) 100 units/mL subcutaneous injection 10 Units, 10 Units, Subcutaneous, TID With Meals, Rupal Mercedes PA-C, 10 Units at 01/30/24 0919    loratadine (CLARITIN) tablet 10 mg, 10 mg, Oral, Daily, Hawa Marsh MD, 10 mg at 01/30/24 0907    metoprolol succinate (TOPROL-XL) 24 hr tablet 100 mg, 100 mg, Oral, Daily, Hawa Marsh MD, 100 mg at 01/30/24 0907    montelukast (SINGULAIR) tablet 10 mg, 10 mg, Oral, HS, Hawa Marsh MD, 10 mg at 01/29/24 2300    pantoprazole (PROTONIX) EC tablet 40 mg, 40 mg, Oral, Daily Before Breakfast, Hawa Marsh MD, 40 mg at 01/30/24 0526    potassium chloride (K-DUR,KLOR-CON) CR tablet 40 mEq, 40 mEq, Oral, Daily, Hawa Marsh MD, 40 mEq at 01/30/24 0907    traZODone (DESYREL) tablet 150 mg, 150 mg, Oral, HS, Hawa Marsh MD, 150 mg at 01/29/24 2300      Labs/Data:        Results from last 7 days   Lab Units 01/27/24  0506 01/26/24  0410 01/25/24  0222   WBC Thousand/uL 10.61* 10.19* 11.16*   HEMOGLOBIN g/dL 9.0* 9.1* 9.6*   HEMATOCRIT % 31.9* 32.4* 34.7*   PLATELETS Thousands/uL 281 298 320     Results from last 7 days   Lab Units 01/30/24  0527 01/29/24  0504 01/28/24  0252   POTASSIUM mmol/L 4.7 3.6 3.7   CHLORIDE mmol/L 93* 92* 91*   CO2 mmol/L 38* 41* 41*   BUN mg/dL 18 18 17   CREATININE mg/dL 0.64 0.68 0.64

## 2024-01-31 LAB
ANION GAP SERPL CALCULATED.3IONS-SCNC: 9 MMOL/L
BASOPHILS # BLD AUTO: 0.02 THOUSANDS/ÂΜL (ref 0–0.1)
BASOPHILS NFR BLD AUTO: 0 % (ref 0–1)
BUN SERPL-MCNC: 18 MG/DL (ref 5–25)
CALCIUM SERPL-MCNC: 8.1 MG/DL (ref 8.4–10.2)
CHLORIDE SERPL-SCNC: 93 MMOL/L (ref 96–108)
CO2 SERPL-SCNC: 37 MMOL/L (ref 21–32)
CREAT SERPL-MCNC: 0.67 MG/DL (ref 0.6–1.3)
EOSINOPHIL # BLD AUTO: 0 THOUSAND/ÂΜL (ref 0–0.61)
EOSINOPHIL NFR BLD AUTO: 0 % (ref 0–6)
ERYTHROCYTE [DISTWIDTH] IN BLOOD BY AUTOMATED COUNT: 18.4 % (ref 11.6–15.1)
GFR SERPL CREATININE-BSD FRML MDRD: 92 ML/MIN/1.73SQ M
GLUCOSE SERPL-MCNC: 150 MG/DL (ref 65–140)
GLUCOSE SERPL-MCNC: 155 MG/DL (ref 65–140)
GLUCOSE SERPL-MCNC: 171 MG/DL (ref 65–140)
GLUCOSE SERPL-MCNC: 194 MG/DL (ref 65–140)
GLUCOSE SERPL-MCNC: 222 MG/DL (ref 65–140)
HCT VFR BLD AUTO: 36.6 % (ref 34.8–46.1)
HGB BLD-MCNC: 10.3 G/DL (ref 11.5–15.4)
IMM GRANULOCYTES # BLD AUTO: 0.06 THOUSAND/UL (ref 0–0.2)
IMM GRANULOCYTES NFR BLD AUTO: 1 % (ref 0–2)
LYMPHOCYTES # BLD AUTO: 1.98 THOUSANDS/ÂΜL (ref 0.6–4.47)
LYMPHOCYTES NFR BLD AUTO: 17 % (ref 14–44)
MCH RBC QN AUTO: 19.5 PG (ref 26.8–34.3)
MCHC RBC AUTO-ENTMCNC: 28.1 G/DL (ref 31.4–37.4)
MCV RBC AUTO: 69 FL (ref 82–98)
MONOCYTES # BLD AUTO: 0.97 THOUSAND/ÂΜL (ref 0.17–1.22)
MONOCYTES NFR BLD AUTO: 8 % (ref 4–12)
NEUTROPHILS # BLD AUTO: 8.75 THOUSANDS/ÂΜL (ref 1.85–7.62)
NEUTS SEG NFR BLD AUTO: 74 % (ref 43–75)
NRBC BLD AUTO-RTO: 0 /100 WBCS
PLATELET # BLD AUTO: 318 THOUSANDS/UL (ref 149–390)
PMV BLD AUTO: 11.2 FL (ref 8.9–12.7)
POTASSIUM SERPL-SCNC: 3.4 MMOL/L (ref 3.5–5.3)
RBC # BLD AUTO: 5.29 MILLION/UL (ref 3.81–5.12)
SODIUM SERPL-SCNC: 139 MMOL/L (ref 135–147)
WBC # BLD AUTO: 11.78 THOUSAND/UL (ref 4.31–10.16)

## 2024-01-31 PROCEDURE — 80048 BASIC METABOLIC PNL TOTAL CA: CPT | Performed by: INTERNAL MEDICINE

## 2024-01-31 PROCEDURE — 94640 AIRWAY INHALATION TREATMENT: CPT

## 2024-01-31 PROCEDURE — 99232 SBSQ HOSP IP/OBS MODERATE 35: CPT | Performed by: PHYSICIAN ASSISTANT

## 2024-01-31 PROCEDURE — 94660 CPAP INITIATION&MGMT: CPT

## 2024-01-31 PROCEDURE — 85025 COMPLETE CBC W/AUTO DIFF WBC: CPT | Performed by: INTERNAL MEDICINE

## 2024-01-31 PROCEDURE — 82948 REAGENT STRIP/BLOOD GLUCOSE: CPT

## 2024-01-31 PROCEDURE — 94760 N-INVAS EAR/PLS OXIMETRY 1: CPT

## 2024-01-31 PROCEDURE — 99233 SBSQ HOSP IP/OBS HIGH 50: CPT | Performed by: INTERNAL MEDICINE

## 2024-01-31 RX ORDER — POTASSIUM CHLORIDE 20 MEQ/1
40 TABLET, EXTENDED RELEASE ORAL ONCE
Status: COMPLETED | OUTPATIENT
Start: 2024-01-31 | End: 2024-01-31

## 2024-01-31 RX ADMIN — INSULIN LISPRO 2 UNITS: 100 INJECTION, SOLUTION INTRAVENOUS; SUBCUTANEOUS at 21:09

## 2024-01-31 RX ADMIN — INSULIN LISPRO 10 UNITS: 100 INJECTION, SOLUTION INTRAVENOUS; SUBCUTANEOUS at 08:14

## 2024-01-31 RX ADMIN — Medication 0.5 MG/HR: at 07:35

## 2024-01-31 RX ADMIN — INSULIN LISPRO 1 UNITS: 100 INJECTION, SOLUTION INTRAVENOUS; SUBCUTANEOUS at 08:13

## 2024-01-31 RX ADMIN — MONTELUKAST 10 MG: 10 TABLET, FILM COATED ORAL at 21:00

## 2024-01-31 RX ADMIN — INSULIN LISPRO 1 UNITS: 100 INJECTION, SOLUTION INTRAVENOUS; SUBCUTANEOUS at 16:06

## 2024-01-31 RX ADMIN — ATORVASTATIN CALCIUM 40 MG: 40 TABLET, FILM COATED ORAL at 16:05

## 2024-01-31 RX ADMIN — BUDESONIDE 0.5 MG: 0.5 INHALANT ORAL at 07:37

## 2024-01-31 RX ADMIN — DOCUSATE SODIUM 100 MG: 100 CAPSULE, LIQUID FILLED ORAL at 08:18

## 2024-01-31 RX ADMIN — POTASSIUM CHLORIDE 40 MEQ: 1500 TABLET, EXTENDED RELEASE ORAL at 08:27

## 2024-01-31 RX ADMIN — ESCITALOPRAM OXALATE 20 MG: 20 TABLET ORAL at 08:18

## 2024-01-31 RX ADMIN — APIXABAN 5 MG: 5 TABLET, FILM COATED ORAL at 17:50

## 2024-01-31 RX ADMIN — DOCUSATE SODIUM 100 MG: 100 CAPSULE, LIQUID FILLED ORAL at 17:50

## 2024-01-31 RX ADMIN — INSULIN LISPRO 1 UNITS: 100 INJECTION, SOLUTION INTRAVENOUS; SUBCUTANEOUS at 12:50

## 2024-01-31 RX ADMIN — ACETAMINOPHEN 325MG 650 MG: 325 TABLET ORAL at 20:59

## 2024-01-31 RX ADMIN — PANTOPRAZOLE SODIUM 40 MG: 40 TABLET, DELAYED RELEASE ORAL at 06:16

## 2024-01-31 RX ADMIN — LORATADINE 10 MG: 10 TABLET ORAL at 08:18

## 2024-01-31 RX ADMIN — INSULIN LISPRO 10 UNITS: 100 INJECTION, SOLUTION INTRAVENOUS; SUBCUTANEOUS at 12:56

## 2024-01-31 RX ADMIN — INSULIN LISPRO 10 UNITS: 100 INJECTION, SOLUTION INTRAVENOUS; SUBCUTANEOUS at 16:06

## 2024-01-31 RX ADMIN — METOPROLOL SUCCINATE 100 MG: 50 TABLET, EXTENDED RELEASE ORAL at 08:18

## 2024-01-31 RX ADMIN — INSULIN GLARGINE 30 UNITS: 100 INJECTION, SOLUTION SUBCUTANEOUS at 21:03

## 2024-01-31 RX ADMIN — EMPAGLIFLOZIN 10 MG: 10 TABLET, FILM COATED ORAL at 08:28

## 2024-01-31 RX ADMIN — TRAZODONE HYDROCHLORIDE 150 MG: 100 TABLET ORAL at 21:00

## 2024-01-31 RX ADMIN — APIXABAN 5 MG: 5 TABLET, FILM COATED ORAL at 08:18

## 2024-01-31 RX ADMIN — Medication 0.5 MG/HR: at 21:06

## 2024-01-31 RX ADMIN — BUDESONIDE 0.5 MG: 0.5 INHALANT ORAL at 20:49

## 2024-01-31 NOTE — PROGRESS NOTES
"Cardiology Progress Note   Mattie Joy 63 y.o. female MRN: [id number]    Unit/Bed#: -01 Encounter: [id number]      Assessment:  Acute on chronic respiratory failure  Baseline oxygen requirements 4 L nasal cannula  Initially on admission patient noted to be hypoxic with saturations in the upper 80s while on 5 L nasal cannula, she has at maximum this admission thus far required 9-10 L but was able to wean down as of 1/23/2024  Etiology multifactorial in light of congestive heart failure as well as COPD exacerbation  Acute on chronic diastolic heart failure  8/15/2023 echo: LVEF 65%, grade 2 diastolic dysfunction, severely elevated RV systolic pressure 61 mmHg  Prehospital diuretic: Bumex 4 twice daily with 40 potassium daily  Prior weight as low as 278 in August 2023 at which time she was reportedly euvolemic  Paroxysmal atrial fibrillation  Patient in atrial fibrillation on arrival with rates in the low 100s  Thromboembolic PPx: Eliquis 5 twice daily  Rate control: Toprol- daily  COPD with acute exacerbation  Type 2 diabetes  Obesity, BMI 51  Pulmonary hypertension likely group 2/3 from left heart disease, underlying lung disease, OHV  Acute kidney injury, resolved    Plan:  Diuresing well on Bumex gtt; if her output tapers (<2L in 24 hours) we can consider adding metolazone 5mg PRN  Continue daily BMPs to assess kidney function and serum lytes  Discussed strict compliance with salt/fluid restrictions  Remainder of cardiac meds as above    Subjective:   Patient seen and examined. Overnight events reviewed. Sleeping wearing CPAP. No acute complaints or concerns at this time.    Objective:     Vitals: Blood pressure 116/64, pulse (!) 112, temperature 97.8 °F (36.6 °C), resp. rate 18, height 5' 3\" (1.6 m), weight 124 kg (273 lb 9.6 oz), SpO2 92%, not currently breastfeeding., Body mass index is 48.47 kg/m².,   Orthostatic Blood Pressures      Flowsheet Row Most Recent Value   Blood Pressure 116/64 filed at " 01/31/2024 0810   Patient Position - Orthostatic VS Sitting filed at 01/31/2024 0353              Intake/Output Summary (Last 24 hours) at 1/31/2024 1132  Last data filed at 1/31/2024 1101  Gross per 24 hour   Intake 1705 ml   Output 9350 ml   Net -7645 ml         Physical Exam:    GEN: Mattie Joy appears well, alert and oriented x 3, pleasant and cooperative   HEENT: Sclera anicteric, conjunctivae pink, mucous membranes moist. Oropharynx clear.   NECK: supple, no significant JVD, Trachea midline, no thyromegaly.   HEART: regular rhythm, normal S1 and S2, no murmurs, clicks, gallops or rubs   LUNGS: decreased breath sounds; no wheezes, rales, or rhonchi. No increased work of breathing or signs of respiratory distress.   ABDOMEN: Soft, nontender, nondistended  EXTREMITIES: Skin warm and well perfused, no clubbing, cyanosis +2 LE edema.   NEURO: No focal findings. Normal speech. Mood and affect normal.   SKIN: Normal without suspicious lesions on exposed skin.      Medications:      Current Facility-Administered Medications:     acetaminophen (TYLENOL) tablet 650 mg, 650 mg, Oral, Q6H PRN, Rosamaria Miller PA-C, 650 mg at 01/29/24 1811    albuterol (PROVENTIL HFA,VENTOLIN HFA) inhaler 2 puff, 2 puff, Inhalation, Q4H PRN, Hawa Marsh MD, 2 puff at 01/23/24 1520    apixaban (ELIQUIS) tablet 5 mg, 5 mg, Oral, BID, Hawa Marsh MD, 5 mg at 01/31/24 0818    atorvastatin (LIPITOR) tablet 40 mg, 40 mg, Oral, Daily With Dinner, Hawa Marsh MD, 40 mg at 01/30/24 1716    budesonide (PULMICORT) inhalation solution 0.5 mg, 0.5 mg, Nebulization, Q12H, Hawa Marsh MD, 0.5 mg at 01/31/24 0737    bumetanide (BUMEX) 12.5 mg infusion 50 mL, 0.5 mg/hr, Intravenous, Continuous, Rosamaria Miller PA-C, Last Rate: 2 mL/hr at 01/31/24 0735, 0.5 mg/hr at 01/31/24 0735    docusate sodium (COLACE) capsule 100 mg, 100 mg, Oral, BID, Hawa Marsh MD, 100 mg at 01/31/24 0818    Empagliflozin (JARDIANCE) tablet 10 mg, 10 mg, Oral, Daily, Chuck Harrell,  ROSALIO, 10 mg at 01/31/24 0828    escitalopram (LEXAPRO) tablet 20 mg, 20 mg, Oral, Daily, Hawa Marsh MD, 20 mg at 01/31/24 0818    insulin glargine (LANTUS) subcutaneous injection 30 Units 0.3 mL, 30 Units, Subcutaneous, HS, Hawa Marsh MD, 30 Units at 01/30/24 2033    insulin lispro (HumaLOG) 100 units/mL subcutaneous injection 1-5 Units, 1-5 Units, Subcutaneous, TID AC, 1 Units at 01/31/24 0813 **AND** Fingerstick Glucose (POCT), , , TID AC, Hawa Marsh MD    insulin lispro (HumaLOG) 100 units/mL subcutaneous injection 1-5 Units, 1-5 Units, Subcutaneous, HS, Hawa Marsh MD, 1 Units at 01/30/24 2035    insulin lispro (HumaLOG) 100 units/mL subcutaneous injection 10 Units, 10 Units, Subcutaneous, TID With Meals, Rupal Mercedes PA-C, 10 Units at 01/31/24 0814    loratadine (CLARITIN) tablet 10 mg, 10 mg, Oral, Daily, Hawa Marsh MD, 10 mg at 01/31/24 0818    metoprolol succinate (TOPROL-XL) 24 hr tablet 100 mg, 100 mg, Oral, Daily, Hawa Marsh MD, 100 mg at 01/31/24 0818    montelukast (SINGULAIR) tablet 10 mg, 10 mg, Oral, HS, Hawa Marsh MD, 10 mg at 01/30/24 2033    pantoprazole (PROTONIX) EC tablet 40 mg, 40 mg, Oral, Daily Before Breakfast, Hawa Marsh MD, 40 mg at 01/31/24 0616    potassium chloride (K-DUR,KLOR-CON) CR tablet 40 mEq, 40 mEq, Oral, Daily, Hawa Marsh MD, 40 mEq at 01/31/24 0827    traZODone (DESYREL) tablet 150 mg, 150 mg, Oral, HS, Hawa Marsh MD, 150 mg at 01/30/24 2033     Labs & Results:        Results from last 7 days   Lab Units 01/31/24  0358 01/27/24  0506 01/26/24  0410   WBC Thousand/uL 11.78* 10.61* 10.19*   HEMOGLOBIN g/dL 10.3* 9.0* 9.1*   HEMATOCRIT % 36.6 31.9* 32.4*   PLATELETS Thousands/uL 318 281 298         Results from last 7 days   Lab Units 01/31/24  0358 01/30/24  0527 01/29/24  0504   POTASSIUM mmol/L 3.4* 4.7 3.6   CHLORIDE mmol/L 93* 93* 92*   CO2 mmol/L 37* 38* 41*   BUN mg/dL 18 18 18   CREATININE mg/dL 0.67 0.64 0.68   CALCIUM mg/dL 8.1* 8.5 8.4         Results  from last 7 days   Lab Units 01/27/24  0506 01/26/24  0410   MAGNESIUM mg/dL 2.5 2.7

## 2024-01-31 NOTE — ASSESSMENT & PLAN NOTE
Lab Results   Component Value Date    HGBA1C 8.5 (H) 01/22/2024       Recent Labs     01/30/24  1202 01/30/24  1625 01/30/24  1944 01/31/24  0635   POCGLU 133 124 159* 150*         Blood Sugar Average: Last 72 hrs:  (P) 167.0982694183135154  Currently uncontrolled, as per recent A1c  Home regimen consists of glargine 30 units at bedtime, insulin lispro 30 units with breakfast, 15 units with lunch and dinner, metformin 500 mg twice daily and Ozempic weekly  Continue with insulin glargine 30 units at bedtime  Increased humalog to 10 units TID on 1/25  Additional coverage with sliding scale  Adjust regimen as needed  Carb consistent diet - unfortunately largely noncompliant.  Per nursing staff frequently asking for various snacks throughout the day  Frequent Accu-Cheks and hypoglycemia protocol

## 2024-01-31 NOTE — PROGRESS NOTES
[facility]  Progress Note  Name: Mattie Joy I  MRN: [id number]  Unit/Bed#: -01 I Date of Admission: 1/22/2024   Date of Service: 1/31/2024 I Hospital Day: 9    Assessment/Plan   * Acute on chronic hypercapnic respiratory failure (HCC)  Assessment & Plan  Baseline oxygen requirement 4 L continuously  Underlying severe COPD, severe pulmonary hypertension and diastolic heart failure  BNP-174  COVID/respiratory panel-negative  Chest x-ray consistent with pulmonary venous congestion, final reading pending  IV Solu-Medrol discontinued  Continue with IV Bumex drip  Continue with telemetry  Appreciate Cardiology and Pulmonology recommendations  Respiratory protocol  Monitor volume status  Strict I's and O's  Fluid restriction  Weaned to baseline of 4 L nasal cannula    Acute on chronic heart failure with preserved ejection fraction (HCC)  Assessment & Plan  Wt Readings from Last 3 Encounters:   01/31/24 124 kg (273 lb 9.6 oz)   11/09/23 129 kg (284 lb 12.8 oz)   09/20/23 128 kg (283 lb)     Appears volume overloaded  Worsening shortness of breath with bilateral lower extremity edema  Chest x-ray-pulmonary venous congestion  BNP-174, in obese patient  Echo 08/23-preserved EF, diastolic dysfunction, right ventricular systolic pressure 61  Home regimen-Bumex 2 mg twice daily  Continue with IV Bumex drip  Cr stable, good urine output and weight decreased  Fluid restriction 1500 mL  Unfortunately patient has been noncompliant with both fluid and salt restriction  Salt restriction  Strict I's and O's  Appreciate Cardiology input    IVELISSE (acute kidney injury) (Formerly [facility])  Assessment & Plan  Recent Labs     01/29/24  0504 01/30/24  0527 01/31/24  0358   CREATININE 0.68 0.64 0.67   EGFR 92 93 92       Estimated Creatinine Clearance: 107 mL/min (by C-G formula based on SCr of 0.67 mg/dL).     Baseline creatinine-0.5-0.7  Etiology-prerenal azotemia in setting of volume overload/acute diastolic heart  failure  Continue with IV Bumex drip  Creatinine stable  Urinary retention protocol  Avoid nephrotoxic agents  Monitor BMP daily    COPD, severe (HCC)  Assessment & Plan  Home regimen consists of benralizumab, Singulair, Pulmicort nebulization twice daily, Xopenex and albuterol as needed, Bevespi twice daily  Was seen at pulmonology office on 1/22, had respiratory distress, was given prednisone taper and urged to go to ED for evaluation  Status post IV Solu-Medrol 125 mg in ED  IV Solu-Medrol 40mg discontinued  Respiratory protocol  Continue with Pulmicort nebulization twice daily  Albuterol and Xopenex as needed  Singulair 10 mg daily  Inpatient consult to Pulmonology -no further inpatient recommendations      Eosinophilic asthma  Assessment & Plan  On benralizumab every 8 weeks  Follows with Pulmonology    Morbid obesity (HCC)  Assessment & Plan  Counseling about adaptation of healthy dietary habits and lifestyle modifications, once medically stable  BMI >50  Affects all aspects of care    Pulmonary hypertension (HCC)  Assessment & Plan  As per recent echo in 08/23, right ventricular systolic pressure is severely elevated at 61 mmHg  Continue with IV Bumex drip  Continue to monitor volume status clinically    Paroxysmal atrial fibrillation (HCC)  Assessment & Plan  Rate controlled with Toprol- mg daily  Anticoagulated with Eliquis 5 mg twice daily    Type 2 diabetes mellitus with stage 2 chronic kidney disease, with long-term current use of insulin (HCC)  Assessment & Plan  Lab Results   Component Value Date    HGBA1C 8.5 (H) 01/22/2024       Recent Labs     01/30/24  1202 01/30/24  1625 01/30/24  1944 01/31/24  0635   POCGLU 133 124 159* 150*         Blood Sugar Average: Last 72 hrs:  (P) 167.1131334685272094  Currently uncontrolled, as per recent A1c  Home regimen consists of glargine 30 units at bedtime, insulin lispro 30 units with breakfast, 15 units with lunch and dinner, metformin 500 mg twice daily  and Ozempic weekly  Continue with insulin glargine 30 units at bedtime  Increased humalog to 10 units TID on 1/25  Additional coverage with sliding scale  Adjust regimen as needed  Carb consistent diet - unfortunately largely noncompliant.  Per nursing staff frequently asking for various snacks throughout the day  Frequent Accu-Cheks and hypoglycemia protocol    Hypercholesterolemia  Assessment & Plan  Continue with atorvastatin 40 mg daily at bedtime    Obstructive sleep apnea  Assessment & Plan  Uses CPAP at home  Follows with Pulmonology on outpatient, awaiting BiPAP study with transcutaneous oxygen monitoring  Continue with BiPAP while inpatient    Esophageal reflux  Assessment & Plan  Continue with Protonix         VTE Pharmacologic Prophylaxis: VTE Score: 8 High Risk (Score >/= 5) - Pharmacological DVT Prophylaxis Ordered: apixaban (Eliquis). Sequential Compression Devices Ordered.    Mobility:   Basic Mobility Inpatient Raw Score: 22  JH-HLM Goal: 7: Walk 25 feet or more  JH-HLM Achieved: 7: Walk 25 feet or more  HLM Goal achieved. Continue to encourage appropriate mobility.    Patient Centered Rounds: I performed bedside rounds with nursing staff today.   Discussions with Specialists or Other Care Team Provider: Discussed with Cardiology, continue on Bumex drip    Education and Discussions with Family / Patient: Patient declined call to .     Total Time Spent on Date of Encounter in care of patient: 35 mins. This time was spent on one or more of the following: performing physical exam; counseling and coordination of care; obtaining or reviewing history; documenting in the medical record; reviewing/ordering tests, medications or procedures; communicating with other healthcare professionals and discussing with patient's family/caregivers.    Current Length of Stay: 9 day(s)  Current Patient Status: Inpatient   Certification Statement: The patient will continue to require additional inpatient  hospital stay due to Bumex drip  Discharge Plan: Anticipate discharge in >72 hrs to home with home services.    Code Status: Level 1 - Full Code    Subjective:   Patient comfortably resting on BiPAP this AM. Offers no additional complaints    Objective:     Vitals:   Temp (24hrs), Av.4 °F (36.9 °C), Min:97 °F (36.1 °C), Max:100.4 °F (38 °C)    Temp:  [97 °F (36.1 °C)-100.4 °F (38 °C)] 97.8 °F (36.6 °C)  HR:  [] 112  Resp:  [18] 18  BP: (101-144)/(56-96) 116/64  SpO2:  [87 %-99 %] 92 %  Body mass index is 48.47 kg/m².     Input and Output Summary (last 24 hours):     Intake/Output Summary (Last 24 hours) at 2024 1050  Last data filed at 2024 1001  Gross per 24 hour   Intake 1705 ml   Output 9050 ml   Net -7345 ml       Physical Exam:   Physical Exam  Vitals and nursing note reviewed.   Constitutional:       General: She is not in acute distress.     Appearance: Normal appearance. She is well-developed. She is morbidly obese.      Interventions: Nasal cannula in place.      Comments: BiPAP   HENT:      Head: Normocephalic and atraumatic.   Eyes:      General: No scleral icterus.     Conjunctiva/sclera: Conjunctivae normal.   Cardiovascular:      Rate and Rhythm: Normal rate and regular rhythm.      Heart sounds: No murmur heard.  Pulmonary:      Effort: Pulmonary effort is normal.      Breath sounds: Decreased breath sounds present. No wheezing, rhonchi or rales.   Abdominal:      General: There is no distension.      Palpations: Abdomen is soft.   Musculoskeletal:      Right lower leg: Edema present.      Left lower leg: Edema present.   Skin:     General: Skin is warm and dry.   Neurological:      General: No focal deficit present.      Mental Status: She is alert.   Psychiatric:         Mood and Affect: Mood normal.        Additional Data:     Labs:  Results from last 7 days   Lab Units 24  0358   WBC Thousand/uL 11.78*   HEMOGLOBIN g/dL 10.3*   HEMATOCRIT % 36.6   PLATELETS Thousands/uL  318   NEUTROS PCT % 74   LYMPHS PCT % 17   MONOS PCT % 8   EOS PCT % 0     Results from last 7 days   Lab Units 01/31/24  0358   SODIUM mmol/L 139   POTASSIUM mmol/L 3.4*   CHLORIDE mmol/L 93*   CO2 mmol/L 37*   BUN mg/dL 18   CREATININE mg/dL 0.67   ANION GAP mmol/L 9   CALCIUM mg/dL 8.1*   GLUCOSE RANDOM mg/dL 155*         Results from last 7 days   Lab Units 01/31/24  0635 01/30/24  1944 01/30/24  1625 01/30/24  1202 01/30/24  0723 01/29/24  2300 01/29/24  1710 01/29/24  1121 01/29/24  0748 01/28/24  2210 01/28/24  1632 01/28/24  1140   POC GLUCOSE mg/dl 150* 159* 124 133 150* 192* 171* 197* 143* 166* 220* 158*               Lines/Drains:  Invasive Devices       Peripheral Intravenous Line  Duration             Peripheral IV 01/30/24 Left;Ventral (anterior) Forearm <1 day                      Telemetry:  Telemetry Orders (From admission, onward)               24 Hour Telemetry Monitoring  Continuous x 24 Hours (Telem)        Question:  Reason for 24 Hour Telemetry  Answer:  Decompensated CHF- and any one of the following: continuous diuretic infusion or total diuretic dose >200 mg daily, associated electrolyte derangement (I.e. K < 3.0), ionotropic drip (continuous infusion), hx of ventricular arrhythmia, or new EF < 35%                     Telemetry Reviewed: Atrial fibrillation. HR averaging 90s - 100s  Indication for Continued Telemetry Use: Acute CHF on >200 mg lasix/day or equivalent dose or with new reduced EF.              Imaging: Reviewed radiology reports from this admission including: chest xray    Recent Cultures (last 7 days):         Last 24 Hours Medication List:   Current Facility-Administered Medications   Medication Dose Route Frequency Provider Last Rate    acetaminophen  650 mg Oral Q6H PRN Rosamaria Miller PA-C      albuterol  2 puff Inhalation Q4H PRN Hawa Marsh MD      apixaban  5 mg Oral BID Hawa Marsh MD      atorvastatin  40 mg Oral Daily With Dinner Hawa Marsh MD      budesonide  0.5 mg  Nebulization Q12H Hawa Marsh MD      bumetanide (BUMEX) 12.5 mg infusion 50 mL  0.5 mg/hr Intravenous Continuous Rosamaria Miller PA-C 0.5 mg/hr (01/31/24 0735)    docusate sodium  100 mg Oral BID Hawa Marsh MD      Empagliflozin  10 mg Oral Daily Chuck Harrell PA-C      escitalopram  20 mg Oral Daily Hawa Marsh MD      insulin glargine  30 Units Subcutaneous HS Hawa Marsh MD      insulin lispro  1-5 Units Subcutaneous TID AC Hawa Marsh MD      insulin lispro  1-5 Units Subcutaneous HS Hawa Marsh MD      insulin lispro  10 Units Subcutaneous TID With Meals Rupal Mercedes PA-C      loratadine  10 mg Oral Daily Hawa Marsh MD      metoprolol succinate  100 mg Oral Daily Hawa Marsh MD      montelukast  10 mg Oral HS Hawa Marsh MD      pantoprazole  40 mg Oral Daily Before Breakfast Hawa Marsh MD      potassium chloride  40 mEq Oral Daily Hawa Marsh MD      traZODone  150 mg Oral HS Hawa Marsh MD          Today, Patient Was Seen By: Kasey Richardson PA-C    **Please Note: This note may have been constructed using a voice recognition system.**

## 2024-01-31 NOTE — CASE MANAGEMENT
Case Management Discharge Planning Note    Patient name Mattie Joy  Location /-01 MRN [id number]  : 1960 Date 2024       Current Admission Date: 2024  Current Admission Diagnosis:Acute on chronic hypercapnic respiratory failure (HCC)   Patient Active Problem List    Diagnosis Date Noted    IVELISSE (acute kidney injury) (HCC) 2024    Eosinophilic asthma 2023    COPD, severe (HCC) 2023    Hepatic steatosis 2023    Morbid obesity (HCC) 02/15/2023    Diabetic polyneuropathy associated with type 2 diabetes mellitus (HCC) 11/15/2021    Type 2 diabetes mellitus with hyperglycemia (HCC) 2021    Tubular adenoma of colon 2021    Transaminitis 2021    Gastric polyp 2021    Class 3 severe obesity in adult (HCC) 2021    Pulmonary hypertension (HCC) 2020    Acute on chronic hypercapnic respiratory failure (HCC) 2020    Insomnia 10/28/2020    Abnormal CT of the chest 2020    Lactic acidosis 2020    Acute on chronic heart failure with preserved ejection fraction (HCC) 2020    Microcytic anemia 2020    Neck pain, chronic 2019    Current moderate episode of major depressive disorder without prior episode (HCC) 2019    Paroxysmal atrial fibrillation (HCC) 2018    Severe persistent asthma 2018    Abnormal computed tomography angiography (CTA) of abdomen 2018    Abnormal CT of the abdomen 2018    Iron deficiency anemia due to chronic blood loss 2018    Type 2 diabetes mellitus with stage 2 chronic kidney disease, with long-term current use of insulin (HCC)     Ganglion cyst of flexor tendon sheath 2017    Paresthesia of upper extremity 2017    Cervical radiculopathy 2017    Elevated liver enzymes 2015    Sexual dysfunction 2014    Chronic low back pain 10/15/2014    Hypercholesterolemia 2014    Lumbar radiculopathy 2014    Esophageal  reflux 06/23/2014    Hypertensive heart and chronic kidney disease with heart failure and stage 1 through stage 4 chronic kidney disease, or chronic kidney disease (HCC) 03/24/2014    Obstructive sleep apnea 03/09/2014      LOS (days): 9  Geometric Mean LOS (GMLOS) (days): 3.9  Days to GMLOS:-4.9     OBJECTIVE:  Risk of Unplanned Readmission Score: 39.54         Current admission status: Inpatient   Preferred Pharmacy:   [facility]- [place], [place] - [facility] [address]  [address] [address]  Phone: [phone] Fax: [phone]    [facility] [place] [place] - [facility]  [address]  [place] [address]  Phone: [phone] Fax: [phone]    Primary Care Provider: Laith Olivares MD    Primary Insurance: MEDICARE  Secondary Insurance: AETNA    DISCHARGE DETAILS:         IMM Given (Date):: 01/31/24  IMM Given to:: Patient  IMM reviewed with patient, patient agrees with discharge determination.

## 2024-01-31 NOTE — ASSESSMENT & PLAN NOTE
Recent Labs     01/29/24  0504 01/30/24  0527 01/31/24  0358   CREATININE 0.68 0.64 0.67   EGFR 92 93 92       Estimated Creatinine Clearance: 107 mL/min (by C-G formula based on SCr of 0.67 mg/dL).     Baseline creatinine-0.5-0.7  Etiology-prerenal azotemia in setting of volume overload/acute diastolic heart failure  Continue with IV Bumex drip  Creatinine stable  Urinary retention protocol  Avoid nephrotoxic agents  Monitor BMP daily

## 2024-01-31 NOTE — PLAN OF CARE
Problem: DISCHARGE PLANNING  Goal: Discharge to home or other facility with appropriate resources  Description: INTERVENTIONS:  - Identify barriers to discharge w/patient and caregiver  - Arrange for needed discharge resources and transportation as appropriate  - Identify discharge learning needs (meds, wound care, etc.)  - Arrange for interpretive services to assist at discharge as needed  - Refer to Case Management Department for coordinating discharge planning if the patient needs post-hospital services based on physician/advanced practitioner order or complex needs related to functional status, cognitive ability, or social support system  Outcome: Progressing     Problem: Knowledge Deficit  Goal: Patient/family/caregiver demonstrates understanding of disease process, treatment plan, medications, and discharge instructions  Description: Complete learning assessment and assess knowledge base.  Interventions:  - Provide teaching at level of understanding  - Provide teaching via preferred learning methods  Outcome: Progressing     Problem: RESPIRATORY - ADULT  Goal: Achieves optimal ventilation and oxygenation  Description: INTERVENTIONS:  - Assess for changes in respiratory status  - Assess for changes in mentation and behavior  - Position to facilitate oxygenation and minimize respiratory effort  - Oxygen administered by appropriate delivery if ordered  - Initiate smoking cessation education as indicated  - Encourage broncho-pulmonary hygiene including cough, deep breathe, Incentive Spirometry  - Assess the need for suctioning and aspirate as needed  - Assess and instruct to report SOB or any respiratory difficulty  - Respiratory Therapy support as indicated  Outcome: Progressing     Problem: METABOLIC, FLUID AND ELECTROLYTES - ADULT  Goal: Electrolytes maintained within normal limits  Description: INTERVENTIONS:  - Monitor labs and assess patient for signs and symptoms of electrolyte imbalances  - Administer  electrolyte replacement as ordered  - Monitor response to electrolyte replacements, including repeat lab results as appropriate  - Instruct patient on fluid and nutrition as appropriate  Outcome: Progressing

## 2024-01-31 NOTE — ASSESSMENT & PLAN NOTE
As per recent echo in 08/23, right ventricular systolic pressure is severely elevated at 61 mmHg  Continue with IV Bumex drip  Continue to monitor volume status clinically

## 2024-01-31 NOTE — CASE MANAGEMENT
Case Management Discharge Planning Note    Patient name Mattie Joy  Location /-01 MRN [id number]  : 1960 Date 2024       Current Admission Date: 2024  Current Admission Diagnosis:Acute on chronic hypercapnic respiratory failure (HCC)   Patient Active Problem List    Diagnosis Date Noted    IVELISSE (acute kidney injury) (HCC) 2024    Eosinophilic asthma 2023    COPD, severe (HCC) 2023    Hepatic steatosis 2023    Morbid obesity (HCC) 02/15/2023    Diabetic polyneuropathy associated with type 2 diabetes mellitus (HCC) 11/15/2021    Type 2 diabetes mellitus with hyperglycemia (HCC) 2021    Tubular adenoma of colon 2021    Transaminitis 2021    Gastric polyp 2021    Class 3 severe obesity in adult (HCC) 2021    Pulmonary hypertension (HCC) 2020    Acute on chronic hypercapnic respiratory failure (HCC) 2020    Insomnia 10/28/2020    Abnormal CT of the chest 2020    Lactic acidosis 2020    Acute on chronic heart failure with preserved ejection fraction (HCC) 2020    Microcytic anemia 2020    Neck pain, chronic 2019    Current moderate episode of major depressive disorder without prior episode (HCC) 2019    Paroxysmal atrial fibrillation (HCC) 2018    Severe persistent asthma 2018    Abnormal computed tomography angiography (CTA) of abdomen 2018    Abnormal CT of the abdomen 2018    Iron deficiency anemia due to chronic blood loss 2018    Type 2 diabetes mellitus with stage 2 chronic kidney disease, with long-term current use of insulin (HCC)     Ganglion cyst of flexor tendon sheath 2017    Paresthesia of upper extremity 2017    Cervical radiculopathy 2017    Elevated liver enzymes 2015    Sexual dysfunction 2014    Chronic low back pain 10/15/2014    Hypercholesterolemia 2014    Lumbar radiculopathy 2014    Esophageal  reflux 06/23/2014    Hypertensive heart and chronic kidney disease with heart failure and stage 1 through stage 4 chronic kidney disease, or chronic kidney disease (HCC) 03/24/2014    Obstructive sleep apnea 03/09/2014      LOS (days): 9  Geometric Mean LOS (GMLOS) (days): 3.9  Days to GMLOS:-5.1     OBJECTIVE:  Risk of Unplanned Readmission Score: 39.61         Current admission status: Inpatient   Preferred Pharmacy:   [facility]- [place], [place] - [facility] [address]  [address] [address]  Phone: [phone] Fax: [phone]    [facility] - [place], [place] - [address]  [address]  [place] [address]  Phone: [phone] Fax: [phone]    Primary Care Provider: Laith Olivares MD    Primary Insurance: MEDICARE  Secondary Insurance: AETNA    DISCHARGE DETAILS:    Additional Comments: Plan is for Pt to return home with [facility] upon discharge.

## 2024-01-31 NOTE — PROGRESS NOTES
24 1100   Clinical Encounter Type   Visited With Patient   Routine Visit Introduction   Referral From Nurse   Presybeterian Encounters   Presybeterian Needs Prayer      Pastoral Care Progress Note    2024  Patient: Mattie Joy : 1960  Admission Date & Time: 2024 1515  MRN: [id number] CSN: [id number]           introductory visit. Offered prayers and support. Offered to contact home Mattie gratefully accepted all. Left a message with Pastor Newton to be in touch with patient. Chaplains remain available.

## 2024-01-31 NOTE — ASSESSMENT & PLAN NOTE
Baseline oxygen requirement 4 L continuously  Underlying severe COPD, severe pulmonary hypertension and diastolic heart failure  BNP-174  COVID/respiratory panel-negative  Chest x-ray consistent with pulmonary venous congestion, final reading pending  IV Solu-Medrol discontinued  Continue with IV Bumex drip  Continue with telemetry  Appreciate Cardiology and Pulmonology recommendations  Respiratory protocol  Monitor volume status  Strict I's and O's  Fluid restriction  Weaned to baseline of 4 L nasal cannula

## 2024-01-31 NOTE — ASSESSMENT & PLAN NOTE
Wt Readings from Last 3 Encounters:   01/31/24 124 kg (273 lb 9.6 oz)   11/09/23 129 kg (284 lb 12.8 oz)   09/20/23 128 kg (283 lb)     Appears volume overloaded  Worsening shortness of breath with bilateral lower extremity edema  Chest x-ray-pulmonary venous congestion  BNP-174, in obese patient  Echo 08/23-preserved EF, diastolic dysfunction, right ventricular systolic pressure 61  Home regimen-Bumex 2 mg twice daily  Continue with IV Bumex drip  Cr stable, good urine output and weight decreased  Fluid restriction 1500 mL  Unfortunately patient has been noncompliant with both fluid and salt restriction  Salt restriction  Strict I's and O's  Appreciate Cardiology input

## 2024-02-01 LAB
ANION GAP SERPL CALCULATED.3IONS-SCNC: 9 MMOL/L
BUN SERPL-MCNC: 19 MG/DL (ref 5–25)
CALCIUM SERPL-MCNC: 8.3 MG/DL (ref 8.4–10.2)
CHLORIDE SERPL-SCNC: 92 MMOL/L (ref 96–108)
CO2 SERPL-SCNC: 41 MMOL/L (ref 21–32)
CREAT SERPL-MCNC: 0.66 MG/DL (ref 0.6–1.3)
GFR SERPL CREATININE-BSD FRML MDRD: 92 ML/MIN/1.73SQ M
GLUCOSE SERPL-MCNC: 162 MG/DL (ref 65–140)
GLUCOSE SERPL-MCNC: 173 MG/DL (ref 65–140)
GLUCOSE SERPL-MCNC: 211 MG/DL (ref 65–140)
GLUCOSE SERPL-MCNC: 242 MG/DL (ref 65–140)
GLUCOSE SERPL-MCNC: 258 MG/DL (ref 65–140)
POTASSIUM SERPL-SCNC: 3.3 MMOL/L (ref 3.5–5.3)
SODIUM SERPL-SCNC: 142 MMOL/L (ref 135–147)

## 2024-02-01 PROCEDURE — 99497 ADVNCD CARE PLAN 30 MIN: CPT | Performed by: PHYSICIAN ASSISTANT

## 2024-02-01 PROCEDURE — 80048 BASIC METABOLIC PNL TOTAL CA: CPT | Performed by: INTERNAL MEDICINE

## 2024-02-01 PROCEDURE — 94640 AIRWAY INHALATION TREATMENT: CPT

## 2024-02-01 PROCEDURE — 94660 CPAP INITIATION&MGMT: CPT

## 2024-02-01 PROCEDURE — 99232 SBSQ HOSP IP/OBS MODERATE 35: CPT | Performed by: PHYSICIAN ASSISTANT

## 2024-02-01 PROCEDURE — 94760 N-INVAS EAR/PLS OXIMETRY 1: CPT

## 2024-02-01 PROCEDURE — 82948 REAGENT STRIP/BLOOD GLUCOSE: CPT

## 2024-02-01 PROCEDURE — 99233 SBSQ HOSP IP/OBS HIGH 50: CPT | Performed by: INTERNAL MEDICINE

## 2024-02-01 RX ORDER — POTASSIUM CHLORIDE 20 MEQ/1
40 TABLET, EXTENDED RELEASE ORAL ONCE
Status: COMPLETED | OUTPATIENT
Start: 2024-02-01 | End: 2024-02-01

## 2024-02-01 RX ORDER — METOLAZONE 2.5 MG/1
2.5 TABLET ORAL ONCE
Status: COMPLETED | OUTPATIENT
Start: 2024-02-01 | End: 2024-02-01

## 2024-02-01 RX ORDER — POTASSIUM CHLORIDE 14.9 MG/ML
20 INJECTION INTRAVENOUS ONCE
Status: COMPLETED | OUTPATIENT
Start: 2024-02-01 | End: 2024-02-01

## 2024-02-01 RX ADMIN — APIXABAN 5 MG: 5 TABLET, FILM COATED ORAL at 17:09

## 2024-02-01 RX ADMIN — BUDESONIDE 0.5 MG: 0.5 INHALANT ORAL at 19:26

## 2024-02-01 RX ADMIN — INSULIN LISPRO 10 UNITS: 100 INJECTION, SOLUTION INTRAVENOUS; SUBCUTANEOUS at 11:46

## 2024-02-01 RX ADMIN — POTASSIUM CHLORIDE 40 MEQ: 1500 TABLET, EXTENDED RELEASE ORAL at 08:01

## 2024-02-01 RX ADMIN — INSULIN LISPRO 2 UNITS: 100 INJECTION, SOLUTION INTRAVENOUS; SUBCUTANEOUS at 21:32

## 2024-02-01 RX ADMIN — APIXABAN 5 MG: 5 TABLET, FILM COATED ORAL at 08:03

## 2024-02-01 RX ADMIN — ATORVASTATIN CALCIUM 40 MG: 40 TABLET, FILM COATED ORAL at 17:09

## 2024-02-01 RX ADMIN — DOCUSATE SODIUM 100 MG: 100 CAPSULE, LIQUID FILLED ORAL at 08:03

## 2024-02-01 RX ADMIN — METOPROLOL SUCCINATE 100 MG: 50 TABLET, EXTENDED RELEASE ORAL at 08:02

## 2024-02-01 RX ADMIN — ESCITALOPRAM OXALATE 20 MG: 20 TABLET ORAL at 08:01

## 2024-02-01 RX ADMIN — INSULIN LISPRO 10 UNITS: 100 INJECTION, SOLUTION INTRAVENOUS; SUBCUTANEOUS at 08:01

## 2024-02-01 RX ADMIN — Medication 0.5 MG/HR: at 19:40

## 2024-02-01 RX ADMIN — ACETAMINOPHEN 325MG 650 MG: 325 TABLET ORAL at 16:25

## 2024-02-01 RX ADMIN — LORATADINE 10 MG: 10 TABLET ORAL at 08:01

## 2024-02-01 RX ADMIN — INSULIN GLARGINE 30 UNITS: 100 INJECTION, SOLUTION SUBCUTANEOUS at 21:30

## 2024-02-01 RX ADMIN — DOCUSATE SODIUM 100 MG: 100 CAPSULE, LIQUID FILLED ORAL at 17:09

## 2024-02-01 RX ADMIN — INSULIN LISPRO 2 UNITS: 100 INJECTION, SOLUTION INTRAVENOUS; SUBCUTANEOUS at 11:46

## 2024-02-01 RX ADMIN — INSULIN LISPRO 2 UNITS: 100 INJECTION, SOLUTION INTRAVENOUS; SUBCUTANEOUS at 17:10

## 2024-02-01 RX ADMIN — BUDESONIDE 0.5 MG: 0.5 INHALANT ORAL at 07:52

## 2024-02-01 RX ADMIN — METOLAZONE 2.5 MG: 2.5 TABLET ORAL at 10:35

## 2024-02-01 RX ADMIN — POTASSIUM CHLORIDE 40 MEQ: 1500 TABLET, EXTENDED RELEASE ORAL at 10:15

## 2024-02-01 RX ADMIN — ACETAMINOPHEN 325MG 650 MG: 325 TABLET ORAL at 10:49

## 2024-02-01 RX ADMIN — TRAZODONE HYDROCHLORIDE 150 MG: 100 TABLET ORAL at 21:29

## 2024-02-01 RX ADMIN — PANTOPRAZOLE SODIUM 40 MG: 40 TABLET, DELAYED RELEASE ORAL at 05:33

## 2024-02-01 RX ADMIN — INSULIN LISPRO 10 UNITS: 100 INJECTION, SOLUTION INTRAVENOUS; SUBCUTANEOUS at 17:09

## 2024-02-01 RX ADMIN — INSULIN LISPRO 1 UNITS: 100 INJECTION, SOLUTION INTRAVENOUS; SUBCUTANEOUS at 08:01

## 2024-02-01 RX ADMIN — POTASSIUM CHLORIDE 20 MEQ: 14.9 INJECTION, SOLUTION INTRAVENOUS at 10:26

## 2024-02-01 RX ADMIN — MONTELUKAST 10 MG: 10 TABLET, FILM COATED ORAL at 21:29

## 2024-02-01 RX ADMIN — EMPAGLIFLOZIN 10 MG: 10 TABLET, FILM COATED ORAL at 08:03

## 2024-02-01 NOTE — ASSESSMENT & PLAN NOTE
Counseling about adaptation of healthy dietary habits and lifestyle modifications, once medically stable  BMI >50 on admission - decreasing with diuresis   Affects all aspects of care

## 2024-02-01 NOTE — PROGRESS NOTES
"Cardiology Progress Note   Mattie Joy 63 y.o. female MRN: [id number]    Unit/Bed#: -01 Encounter: [id number]      Assessment:  Acute on chronic respiratory failure  Baseline oxygen requirements 4 L nasal cannula  Initially on admission patient noted to be hypoxic with saturations in the upper 80s while on 5 L nasal cannula, she has at maximum this admission thus far required 9-10 L but was able to wean down as of 1/23/2024  Etiology multifactorial in light of congestive heart failure as well as COPD exacerbation  Acute on chronic diastolic heart failure  8/15/2023 echo: LVEF 65%, grade 2 diastolic dysfunction, severely elevated RV systolic pressure 61 mmHg  Prehospital diuretic: Bumex 4 twice daily with 40 potassium daily  Prior weight as low as 278 in August 2023 at which time she was reportedly euvolemic  Paroxysmal atrial fibrillation  Patient in atrial fibrillation on arrival with rates in the low 100s  Thromboembolic PPx: Eliquis 5 twice daily  Rate control: Toprol- daily  COPD with acute exacerbation  Type 2 diabetes  Obesity, BMI 51  Pulmonary hypertension likely group 2/3 from left heart disease, underlying lung disease, OHV  Acute kidney injury, resolved     Plan:  Continues on IV bumex gtt; -4.4L over the last 24 hours although intake may be grossly understated (drinking water frequently from her room faucet). She also has been snacking on salty foods during her admission (2 bags of goldfish as of this AM)  Add metolazone 2.5mg x1 to her regimen today to boost diuresis  Continue daily BMPs to assess kidney function and serum lytes  Discussed strict compliance with salt/fluid restrictions  Remainder of cardiac meds as above    Subjective:   Patient seen and examined. Overnight events reviewed. Patient denies any acute complaints at this time.     Objective:     Vitals: Blood pressure 116/63, pulse 98, temperature 97.9 °F (36.6 °C), resp. rate 16, height 5' 3\" (1.6 m), weight 123 kg (271 lb 9.6 " oz), SpO2 94%, not currently breastfeeding., Body mass index is 48.11 kg/m².,   Orthostatic Blood Pressures      Flowsheet Row Most Recent Value   Blood Pressure 116/63 filed at 02/01/2024 0741   Patient Position - Orthostatic VS Sitting filed at 01/31/2024 1600              Intake/Output Summary (Last 24 hours) at 2/1/2024 1201  Last data filed at 2/1/2024 1145  Gross per 24 hour   Intake 3315 ml   Output 6650 ml   Net -3335 ml         Physical Exam:    GEN: Mattie Joy appears well, alert and oriented x 3, pleasant and cooperative   HEENT: Sclera anicteric, conjunctivae pink, mucous membranes moist. Oropharynx clear.   NECK: supple, no significant JVD, Trachea midline, no thyromegaly.   HEART: regular rhythm, normal S1 and S2, no murmurs, clicks, gallops or rubs   LUNGS: clear to auscultation bilaterally; no wheezes, rales, or rhonchi. No increased work of breathing or signs of respiratory distress.   ABDOMEN: Soft, nontender, nondistended  EXTREMITIES: Skin warm and well perfused, no clubbing, cyanosis, or edema.  NEURO: No focal findings. Normal speech. Mood and affect normal.   SKIN: Normal without suspicious lesions on exposed skin.      Medications:      Current Facility-Administered Medications:     acetaminophen (TYLENOL) tablet 650 mg, 650 mg, Oral, Q6H PRN, Rosamaria Miller PA-C, 650 mg at 02/01/24 1049    albuterol (PROVENTIL HFA,VENTOLIN HFA) inhaler 2 puff, 2 puff, Inhalation, Q4H PRN, Hawa Marhs MD, 2 puff at 01/23/24 1520    apixaban (ELIQUIS) tablet 5 mg, 5 mg, Oral, BID, Hawa Marsh MD, 5 mg at 02/01/24 0803    atorvastatin (LIPITOR) tablet 40 mg, 40 mg, Oral, Daily With Dinner, Hawa Marsh MD, 40 mg at 01/31/24 1605    budesonide (PULMICORT) inhalation solution 0.5 mg, 0.5 mg, Nebulization, Q12H, Hawa Marsh MD, 0.5 mg at 02/01/24 0752    bumetanide (BUMEX) 12.5 mg infusion 50 mL, 0.5 mg/hr, Intravenous, Continuous, Rosamaria Miller PA-C, Last Rate: 2 mL/hr at 01/31/24 2106, 0.5 mg/hr at 01/31/24 2106     docusate sodium (COLACE) capsule 100 mg, 100 mg, Oral, BID, Hawa Marsh MD, 100 mg at 02/01/24 0803    Empagliflozin (JARDIANCE) tablet 10 mg, 10 mg, Oral, Daily, Chuck Harrell PA-C, 10 mg at 02/01/24 0803    escitalopram (LEXAPRO) tablet 20 mg, 20 mg, Oral, Daily, Hawa Marsh MD, 20 mg at 02/01/24 0801    insulin glargine (LANTUS) subcutaneous injection 30 Units 0.3 mL, 30 Units, Subcutaneous, HS, Hawa Marsh MD, 30 Units at 01/31/24 2103    insulin lispro (HumaLOG) 100 units/mL subcutaneous injection 1-5 Units, 1-5 Units, Subcutaneous, TID AC, 2 Units at 02/01/24 1146 **AND** Fingerstick Glucose (POCT), , , TID AC, Hawa Marsh MD    insulin lispro (HumaLOG) 100 units/mL subcutaneous injection 1-5 Units, 1-5 Units, Subcutaneous, HS, Hawa Marsh MD, 2 Units at 01/31/24 2109    insulin lispro (HumaLOG) 100 units/mL subcutaneous injection 10 Units, 10 Units, Subcutaneous, TID With Meals, Rupal Mercedes PA-C, 10 Units at 02/01/24 1146    loratadine (CLARITIN) tablet 10 mg, 10 mg, Oral, Daily, Hawa Marsh MD, 10 mg at 02/01/24 0801    metoprolol succinate (TOPROL-XL) 24 hr tablet 100 mg, 100 mg, Oral, Daily, Hawa Marsh MD, 100 mg at 02/01/24 0802    montelukast (SINGULAIR) tablet 10 mg, 10 mg, Oral, HS, Hawa Marsh MD, 10 mg at 01/31/24 2100    pantoprazole (PROTONIX) EC tablet 40 mg, 40 mg, Oral, Daily Before Breakfast, Hawa Marsh MD, 40 mg at 02/01/24 0533    potassium chloride (K-DUR,KLOR-CON) CR tablet 40 mEq, 40 mEq, Oral, Daily, Hawa Marsh MD, 40 mEq at 02/01/24 0801    potassium chloride 20 mEq IVPB (premix), 20 mEq, Intravenous, Once, Kasey Richardson PA-C, Last Rate: 50 mL/hr at 02/01/24 1026, 20 mEq at 02/01/24 1026    traZODone (DESYREL) tablet 150 mg, 150 mg, Oral, HS, Hawa Marsh MD, 150 mg at 01/31/24 2100     Labs & Results:        Results from last 7 days   Lab Units 01/31/24  0358 01/27/24  0506 01/26/24  0410   WBC Thousand/uL 11.78* 10.61* 10.19*   HEMOGLOBIN g/dL 10.3* 9.0* 9.1*   HEMATOCRIT %  36.6 31.9* 32.4*   PLATELETS Thousands/uL 318 281 298         Results from last 7 days   Lab Units 02/01/24  0534 01/31/24  0358 01/30/24  0527   POTASSIUM mmol/L 3.3* 3.4* 4.7   CHLORIDE mmol/L 92* 93* 93*   CO2 mmol/L 41* 37* 38*   BUN mg/dL 19 18 18   CREATININE mg/dL 0.66 0.67 0.64   CALCIUM mg/dL 8.3* 8.1* 8.5         Results from last 7 days   Lab Units 01/27/24  0506 01/26/24  0410   MAGNESIUM mg/dL 2.5 2.7

## 2024-02-01 NOTE — ACP (ADVANCE CARE PLANNING)
Advanced Care Planning Progress Note    Serious Illness Conversation    1. What is your understanding now of where you are with your illness?  Prognostic Understanding: appropriate understanding of prognosis  Acknowledges that her high salt, high fluid diet is likely contributing to her current condition however she is not really willing to compromise on this at this time, though she knows she needs to.      2. How much information about what is likely to be ahead with your illness would you like to have?  Information: patient wants to be fully informed     3. What did you (clinician) communicate to the patient?  Prognostic Communication: Uncertain - It can be difficult to predict what will happen with your illness. I hope you will continue to live well for a long time but I’m worried that you could get sick quickly, and I think it is important to prepare for that possibility.     4. If your health situation worsens, what are your most important goals?  Goals: be at home, live as long as possible, no matter what  Willing to return to the hospital as often as necessary for treatment.     5. What are the biggest fears and worries about the future and your health?  Not being able to breathe     6. What abilities are so critical to your life that you cannot imagine living without them?  Unacceptable Function: not being myself     7. What gives you strength as you think about the future with your illness?  Family support     8. If you become sicker, how much are you willing to go through for the possibility of gaining more time?  Be in the hospital: Yes    Be in the ICU: Yes     Be uncomfortable: Yes    Undergo aggressive test and/or procedures: Yes   9. How much does your proxy and family know about your priorities and wishes?  Wants to speak to family herself. Declines family update by provider     I’ve heard you say that getting better and being treatment focused is really important to you. Keeping that in mind, and  what we know about your illness, I recommend that we try to reduce sodium and fluid intake. This will help us make sure that your treatment plans reflect what’s important to you.     How does this plan sound to you? I will do everything I can to help you through this.  Patient verbalized understanding of the plan     I have spent 25 minutes speaking with my patient on advanced care planning today or during this visit     Advanced directives         Kasey Richardson PA-C

## 2024-02-01 NOTE — PROGRESS NOTES
Pastoral Care Progress Note    2024  Patient: Mattie Joy : 1960  Admission Date & Time: 2024 1515  MRN: [id number] [facility]: [id number]     24 1300   Clinical Encounter Type   Visited With Patient   Routine Visit Follow-up      Intern visited with pt while rounding.  Intern introduced themself to the pt and asked if there was anything she wanted to talk about at this time. Pt stated that her  would be coming to visit today and that she did not need anything at this time.  Intern informed the pt on how to contact chaplains if needed. Spiritual care remains available.

## 2024-02-01 NOTE — ASSESSMENT & PLAN NOTE
Baseline oxygen requirement 4 L continuously  Underlying severe COPD, severe pulmonary hypertension and diastolic heart failure  BNP-174  COVID/respiratory panel-negative  Chest x-ray consistent with pulmonary venous congestion, final reading pending  IV Solu-Medrol discontinued  Continue with IV Bumex drip - Metolazone added 2/1  Continue with telemetry  Appreciate Cardiology and Pulmonology recommendations  Respiratory protocol  Monitor volume status  Strict I's and O's  Fluid restriction  Weaned to baseline of 4 L nasal cannula

## 2024-02-01 NOTE — ASSESSMENT & PLAN NOTE
Lab Results   Component Value Date    HGBA1C 8.5 (H) 01/22/2024       Recent Labs     01/31/24  1557 01/31/24  2106 02/01/24  0738 02/01/24  1106   POCGLU 194* 222* 173* 211*         Blood Sugar Average: Last 72 hrs:  (P) 170.4525865402435787  Currently uncontrolled, as per recent A1c  Home regimen consists of glargine 30 units at bedtime, insulin lispro 30 units with breakfast, 15 units with lunch and dinner, metformin 500 mg twice daily and Ozempic weekly  Continue with insulin glargine 30 units at bedtime  Increased humalog to 10 units TID on 1/25  Additional coverage with sliding scale  Adjust regimen as needed  Carb consistent diet - unfortunately largely noncompliant.  Per nursing staff frequently asking for various snacks throughout the day  Frequent Accu-Cheks and hypoglycemia protocol

## 2024-02-01 NOTE — ASSESSMENT & PLAN NOTE
Wt Readings from Last 3 Encounters:   02/01/24 123 kg (271 lb 9.6 oz)   11/09/23 129 kg (284 lb 12.8 oz)   09/20/23 128 kg (283 lb)     Appears volume overloaded  Worsening shortness of breath with bilateral lower extremity edema  Chest x-ray-pulmonary venous congestion  BNP-174, in obese patient  Echo 08/23-preserved EF, diastolic dysfunction, right ventricular systolic pressure 61  Home regimen-Bumex 2 mg twice daily  Continue with IV Bumex drip  Cr stable, good urine output and weight decreased  Metolazone added   Fluid restriction 1500 mL  Unfortunately patient has been noncompliant with both fluid and salt restriction  Salt restriction  Strict I's and O's  Appreciate Cardiology input

## 2024-02-01 NOTE — PROGRESS NOTES
[facility]  Progress Note  Name: Mattie Joy I  MRN: [id number]  Unit/Bed#: -01 I Date of Admission: 1/22/2024   Date of Service: 2/1/2024 I Hospital Day: 10    Assessment/Plan   * Acute on chronic hypercapnic respiratory failure (HCC)  Assessment & Plan  Baseline oxygen requirement 4 L continuously  Underlying severe COPD, severe pulmonary hypertension and diastolic heart failure  BNP-174  COVID/respiratory panel-negative  Chest x-ray consistent with pulmonary venous congestion, final reading pending  IV Solu-Medrol discontinued  Continue with IV Bumex drip - Metolazone added 2/1  Continue with telemetry  Appreciate Cardiology and Pulmonology recommendations  Respiratory protocol  Monitor volume status  Strict I's and O's  Fluid restriction  Weaned to baseline of 4 L nasal cannula    Acute on chronic heart failure with preserved ejection fraction (HCC)  Assessment & Plan  Wt Readings from Last 3 Encounters:   02/01/24 123 kg (271 lb 9.6 oz)   11/09/23 129 kg (284 lb 12.8 oz)   09/20/23 128 kg (283 lb)     Appears volume overloaded  Worsening shortness of breath with bilateral lower extremity edema  Chest x-ray-pulmonary venous congestion  BNP-174, in obese patient  Echo 08/23-preserved EF, diastolic dysfunction, right ventricular systolic pressure 61  Home regimen-Bumex 2 mg twice daily  Continue with IV Bumex drip  Cr stable, good urine output and weight decreased  Metolazone added   Fluid restriction 1500 mL  Unfortunately patient has been noncompliant with both fluid and salt restriction  Salt restriction  Strict I's and O's  Appreciate Cardiology input    IVELISSE (acute kidney injury) (HCC)  Assessment & Plan  Recent Labs     01/30/24  0527 01/31/24  0358 02/01/24  0534   CREATININE 0.64 0.67 0.66   EGFR 93 92 92       Estimated Creatinine Clearance: 108.1 mL/min (by C-G formula based on SCr of 0.66 mg/dL).     Baseline creatinine-0.5-0.7  Etiology-prerenal azotemia in setting  of volume overload/acute diastolic heart failure  Continue with IV Bumex drip  Creatinine stable  Urinary retention protocol  Avoid nephrotoxic agents  Monitor BMP daily    COPD, severe (HCC)  Assessment & Plan  Home regimen consists of benralizumab, Singulair, Pulmicort nebulization twice daily, Xopenex and albuterol as needed, Bevespi twice daily  Was seen at pulmonology office on 1/22, had respiratory distress, was given prednisone taper and urged to go to ED for evaluation  Status post IV Solu-Medrol 125 mg in ED  IV Solu-Medrol 40mg discontinued  Respiratory protocol  Continue with Pulmicort nebulization twice daily  Albuterol and Xopenex as needed  Singulair 10 mg daily  Inpatient consult to Pulmonology -no further inpatient recommendations      Eosinophilic asthma  Assessment & Plan  On benralizumab every 8 weeks  Follows with Pulmonology    Morbid obesity (HCC)  Assessment & Plan  Counseling about adaptation of healthy dietary habits and lifestyle modifications, once medically stable  BMI >50 on admission - decreasing with diuresis   Affects all aspects of care    Pulmonary hypertension (HCC)  Assessment & Plan  As per recent echo in 08/23, right ventricular systolic pressure is severely elevated at 61 mmHg  Continue with IV Bumex drip  Metolazone added 2/1  Continue to monitor volume status clinically    Paroxysmal atrial fibrillation (HCC)  Assessment & Plan  Rate controlled with Toprol- mg daily  Anticoagulated with Eliquis 5 mg twice daily    Type 2 diabetes mellitus with stage 2 chronic kidney disease, with long-term current use of insulin (HCC)  Assessment & Plan  Lab Results   Component Value Date    HGBA1C 8.5 (H) 01/22/2024       Recent Labs     01/31/24  1557 01/31/24  2106 02/01/24  0738 02/01/24  1106   POCGLU 194* 222* 173* 211*         Blood Sugar Average: Last 72 hrs:  (P) 170.9595515034310397  Currently uncontrolled, as per recent A1c  Home regimen consists of glargine 30 units at  bedtime, insulin lispro 30 units with breakfast, 15 units with lunch and dinner, metformin 500 mg twice daily and Ozempic weekly  Continue with insulin glargine 30 units at bedtime  Increased humalog to 10 units TID on 1/25  Additional coverage with sliding scale  Adjust regimen as needed  Carb consistent diet - unfortunately largely noncompliant.  Per nursing staff frequently asking for various snacks throughout the day  Frequent Accu-Cheks and hypoglycemia protocol    Hypercholesterolemia  Assessment & Plan  Continue with atorvastatin 40 mg daily at bedtime    Obstructive sleep apnea  Assessment & Plan  Uses CPAP at home  Follows with Pulmonology on outpatient, awaiting BiPAP study with transcutaneous oxygen monitoring  Continue with BiPAP while inpatient    Esophageal reflux  Assessment & Plan  Continue with Protonix           VTE Pharmacologic Prophylaxis: VTE Score: 8 High Risk (Score >/= 5) - Pharmacological DVT Prophylaxis Ordered: apixaban (Eliquis). Sequential Compression Devices Ordered.    Mobility:   Basic Mobility Inpatient Raw Score: 20  JH-HLM Goal: 6: Walk 10 steps or more  JH-HLM Achieved: 7: Walk 25 feet or more  HLM Goal achieved. Continue to encourage appropriate mobility.    Patient Centered Rounds: I performed bedside rounds with nursing staff today.   Discussions with Specialists or Other Care Team Provider: Discussed with cardiology, metolazone added today    Education and Discussions with Family / Patient: Patient declined call to .     Total Time Spent on Date of Encounter in care of patient: 35 mins. This time was spent on one or more of the following: performing physical exam; counseling and coordination of care; obtaining or reviewing history; documenting in the medical record; reviewing/ordering tests, medications or procedures; communicating with other healthcare professionals and discussing with patient's family/caregivers.    Current Length of Stay: 10 day(s)  Current  Patient Status: Inpatient   Certification Statement: The patient will continue to require additional inpatient hospital stay due to Bumex drip, CHF exacerbation  Discharge Plan: Anticipate discharge in >72 hrs to home.    Code Status: Level 1 - Full Code    Subjective:   Patient admits to enjoying a high sodium diet with high     Objective:     Vitals:   Temp (24hrs), Av.1 °F (36.7 °C), Min:97.9 °F (36.6 °C), Max:98.2 °F (36.8 °C)    Temp:  [97.9 °F (36.6 °C)-98.2 °F (36.8 °C)] 98.2 °F (36.8 °C)  HR:  [] 117  Resp:  [16-22] 16  BP: (114-116)/(62-80) 115/80  SpO2:  [90 %-98 %] 92 %  Body mass index is 48.11 kg/m².     Input and Output Summary (last 24 hours):     Intake/Output Summary (Last 24 hours) at 2024 1715  Last data filed at 2024 1547  Gross per 24 hour   Intake 3285 ml   Output 8750 ml   Net -5465 ml       Physical Exam:   Physical Exam  Vitals and nursing note reviewed.   Constitutional:       General: She is not in acute distress.     Appearance: Normal appearance. She is well-developed.   HENT:      Head: Normocephalic and atraumatic.   Eyes:      General: No scleral icterus.     Conjunctiva/sclera: Conjunctivae normal.   Cardiovascular:      Rate and Rhythm: Normal rate and regular rhythm.      Heart sounds: No murmur heard.  Pulmonary:      Effort: Pulmonary effort is normal.      Breath sounds: Decreased air movement present. Decreased breath sounds and rales present. No wheezing or rhonchi.   Abdominal:      General: There is no distension.      Palpations: Abdomen is soft.   Musculoskeletal:      Right lower leg: Edema present.      Left lower leg: Edema present.   Skin:     General: Skin is warm and dry.   Neurological:      General: No focal deficit present.      Mental Status: She is alert.   Psychiatric:         Mood and Affect: Mood normal.        Additional Data:     Labs:  Results from last 7 days   Lab Units 24  0358   WBC Thousand/uL 11.78*   HEMOGLOBIN g/dL 10.3*    HEMATOCRIT % 36.6   PLATELETS Thousands/uL 318   NEUTROS PCT % 74   LYMPHS PCT % 17   MONOS PCT % 8   EOS PCT % 0     Results from last 7 days   Lab Units 02/01/24  0534   SODIUM mmol/L 142   POTASSIUM mmol/L 3.3*   CHLORIDE mmol/L 92*   CO2 mmol/L 41*   BUN mg/dL 19   CREATININE mg/dL 0.66   ANION GAP mmol/L 9   CALCIUM mg/dL 8.3*   GLUCOSE RANDOM mg/dL 162*         Results from last 7 days   Lab Units 02/01/24  1604 02/01/24  1106 02/01/24  0738 01/31/24  2106 01/31/24  1557 01/31/24  1115 01/31/24  0635 01/30/24  1944 01/30/24  1625 01/30/24  1202 01/30/24  0723 01/29/24  2300   POC GLUCOSE mg/dl 258* 211* 173* 222* 194* 171* 150* 159* 124 133 150* 192*               Lines/Drains:  Invasive Devices       Peripheral Intravenous Line  Duration             Peripheral IV 01/30/24 Left;Ventral (anterior) Forearm 1 day                      Telemetry:  Telemetry Orders (From admission, onward)               24 Hour Telemetry Monitoring  Continuous x 24 Hours (Telem)        Question:  Reason for 24 Hour Telemetry  Answer:  Decompensated CHF- and any one of the following: continuous diuretic infusion or total diuretic dose >200 mg daily, associated electrolyte derangement (I.e. K < 3.0), ionotropic drip (continuous infusion), hx of ventricular arrhythmia, or new EF < 35%                     Telemetry Reviewed: Normal Sinus Rhythm  Indication for Continued Telemetry Use: Acute CHF on >200 mg lasix/day or equivalent dose or with new reduced EF.              Imaging: No pertinent imaging reviewed.    Recent Cultures (last 7 days):         Last 24 Hours Medication List:   Current Facility-Administered Medications   Medication Dose Route Frequency Provider Last Rate    acetaminophen  650 mg Oral Q6H PRN Rosamaria Miller PA-C      albuterol  2 puff Inhalation Q4H PRN Hawa Marsh MD      apixaban  5 mg Oral BID Hawa Marsh MD      atorvastatin  40 mg Oral Daily With Dinner Hawa Marsh MD      budesonide  0.5 mg Nebulization Q12H  Hawa Marsh MD      bumetanide (BUMEX) 12.5 mg infusion 50 mL  0.5 mg/hr Intravenous Continuous Rosamaria Miller PA-C 0.5 mg/hr (01/31/24 2106)    docusate sodium  100 mg Oral BID Hawa Marsh MD      Empagliflozin  10 mg Oral Daily Chuck Harrell PA-C      escitalopram  20 mg Oral Daily Hawa Marsh MD      insulin glargine  30 Units Subcutaneous HS Hawa Marsh MD      insulin lispro  1-5 Units Subcutaneous TID AC Hawa Marsh MD      insulin lispro  1-5 Units Subcutaneous HS Hawa Marsh MD      insulin lispro  10 Units Subcutaneous TID With Meals Rupal Mercedes PA-C      loratadine  10 mg Oral Daily Hawa Marsh MD      metoprolol succinate  100 mg Oral Daily Hawa Marsh MD      montelukast  10 mg Oral HS Hawa Marsh MD      pantoprazole  40 mg Oral Daily Before Breakfast Hawa Marsh MD      potassium chloride  40 mEq Oral Daily Hawa Marsh MD      traZODone  150 mg Oral HS Hawa Marsh MD          Today, Patient Was Seen By: Kasey Richardson PA-C    **Please Note: This note may have been constructed using a voice recognition system.**

## 2024-02-01 NOTE — NURSING NOTE
Informed slim 1/31 patient drinking water from bathroom.  Made commitment I would give water so we can properly track I/O's.  Patient agreed as over but properly charted on day shift

## 2024-02-01 NOTE — ASSESSMENT & PLAN NOTE
As per recent echo in 08/23, right ventricular systolic pressure is severely elevated at 61 mmHg  Continue with IV Bumex drip  Metolazone added 2/1  Continue to monitor volume status clinically

## 2024-02-01 NOTE — PLAN OF CARE
Problem: DISCHARGE PLANNING  Goal: Discharge to home or other facility with appropriate resources  Description: INTERVENTIONS:  - Identify barriers to discharge w/patient and caregiver  - Arrange for needed discharge resources and transportation as appropriate  - Identify discharge learning needs (meds, wound care, etc.)  - Arrange for interpretive services to assist at discharge as needed  - Refer to Case Management Department for coordinating discharge planning if the patient needs post-hospital services based on physician/advanced practitioner order or complex needs related to functional status, cognitive ability, or social support system  Outcome: Progressing     Problem: Knowledge Deficit  Goal: Patient/family/caregiver demonstrates understanding of disease process, treatment plan, medications, and discharge instructions  Description: Complete learning assessment and assess knowledge base.  Interventions:  - Provide teaching at level of understanding  - Provide teaching via preferred learning methods  Outcome: Progressing     Problem: CARDIOVASCULAR - ADULT  Goal: Maintains optimal cardiac output and hemodynamic stability  Description: INTERVENTIONS:  - Monitor I/O, vital signs and rhythm  - Monitor for S/S and trends of decreased cardiac output  - Administer and titrate ordered vasoactive medications to optimize hemodynamic stability  - Assess quality of pulses, skin color and temperature  - Assess for signs of decreased coronary artery perfusion  - Instruct patient to report change in severity of symptoms  Outcome: Progressing  Goal: Absence of cardiac dysrhythmias or at baseline rhythm  Description: INTERVENTIONS:  - Continuous cardiac monitoring, vital signs, obtain 12 lead EKG if ordered  - Administer antiarrhythmic and heart rate control medications as ordered  - Monitor electrolytes and administer replacement therapy as ordered  Outcome: Progressing     Problem: RESPIRATORY - ADULT  Goal: Achieves  optimal ventilation and oxygenation  Description: INTERVENTIONS:  - Assess for changes in respiratory status  - Assess for changes in mentation and behavior  - Position to facilitate oxygenation and minimize respiratory effort  - Oxygen administered by appropriate delivery if ordered  - Initiate smoking cessation education as indicated  - Encourage broncho-pulmonary hygiene including cough, deep breathe, Incentive Spirometry  - Assess the need for suctioning and aspirate as needed  - Assess and instruct to report SOB or any respiratory difficulty  - Respiratory Therapy support as indicated  Outcome: Progressing     Problem: METABOLIC, FLUID AND ELECTROLYTES - ADULT  Goal: Electrolytes maintained within normal limits  Description: INTERVENTIONS:  - Monitor labs and assess patient for signs and symptoms of electrolyte imbalances  - Administer electrolyte replacement as ordered  - Monitor response to electrolyte replacements, including repeat lab results as appropriate  - Instruct patient on fluid and nutrition as appropriate  Outcome: Progressing  Goal: Fluid balance maintained  Description: INTERVENTIONS:  - Monitor labs   - Monitor I/O and WT  - Instruct patient on fluid and nutrition as appropriate  - Assess for signs & symptoms of volume excess or deficit  Outcome: Progressing  Goal: Glucose maintained within target range  Description: INTERVENTIONS:  - Monitor Blood Glucose as ordered  - Assess for signs and symptoms of hyperglycemia and hypoglycemia  - Administer ordered medications to maintain glucose within target range  - Assess nutritional intake and initiate nutrition service referral as needed  Outcome: Progressing     Problem: Prexisting or High Potential for Compromised Skin Integrity  Goal: Skin integrity is maintained or improved  Description: INTERVENTIONS:  - Identify patients at risk for skin breakdown  - Assess and monitor skin integrity  - Assess and monitor nutrition and hydration status  -  Monitor labs   - Assess for incontinence   - Turn and reposition patient  - Assist with mobility/ambulation  - Relieve pressure over bony prominences  - Avoid friction and shearing  - Provide appropriate hygiene as needed including keeping skin clean and dry  - Evaluate need for skin moisturizer/barrier cream  - Collaborate with interdisciplinary team   - Patient/family teaching  - Consider wound care consult   Outcome: Progressing

## 2024-02-01 NOTE — ASSESSMENT & PLAN NOTE
Recent Labs     01/30/24  0527 01/31/24  0358 02/01/24  0534   CREATININE 0.64 0.67 0.66   EGFR 93 92 92       Estimated Creatinine Clearance: 108.1 mL/min (by C-G formula based on SCr of 0.66 mg/dL).     Baseline creatinine-0.5-0.7  Etiology-prerenal azotemia in setting of volume overload/acute diastolic heart failure  Continue with IV Bumex drip  Creatinine stable  Urinary retention protocol  Avoid nephrotoxic agents  Monitor BMP daily

## 2024-02-02 LAB
ANION GAP SERPL CALCULATED.3IONS-SCNC: 21 MMOL/L
BUN SERPL-MCNC: 28 MG/DL (ref 5–25)
CALCIUM SERPL-MCNC: 9 MG/DL (ref 8.4–10.2)
CHLORIDE SERPL-SCNC: 80 MMOL/L (ref 96–108)
CO2 SERPL-SCNC: 39 MMOL/L (ref 21–32)
CREAT SERPL-MCNC: 0.85 MG/DL (ref 0.6–1.3)
GFR SERPL CREATININE-BSD FRML MDRD: 72 ML/MIN/1.73SQ M
GLUCOSE SERPL-MCNC: 197 MG/DL (ref 65–140)
GLUCOSE SERPL-MCNC: 213 MG/DL (ref 65–140)
GLUCOSE SERPL-MCNC: 262 MG/DL (ref 65–140)
GLUCOSE SERPL-MCNC: 267 MG/DL (ref 65–140)
GLUCOSE SERPL-MCNC: 312 MG/DL (ref 65–140)
POTASSIUM SERPL-SCNC: 2.9 MMOL/L (ref 3.5–5.3)
SODIUM SERPL-SCNC: 140 MMOL/L (ref 135–147)

## 2024-02-02 PROCEDURE — 99232 SBSQ HOSP IP/OBS MODERATE 35: CPT | Performed by: PHYSICIAN ASSISTANT

## 2024-02-02 PROCEDURE — 99232 SBSQ HOSP IP/OBS MODERATE 35: CPT | Performed by: INTERNAL MEDICINE

## 2024-02-02 PROCEDURE — 80048 BASIC METABOLIC PNL TOTAL CA: CPT | Performed by: INTERNAL MEDICINE

## 2024-02-02 PROCEDURE — 94760 N-INVAS EAR/PLS OXIMETRY 1: CPT

## 2024-02-02 PROCEDURE — 94660 CPAP INITIATION&MGMT: CPT

## 2024-02-02 PROCEDURE — 94003 VENT MGMT INPAT SUBQ DAY: CPT

## 2024-02-02 PROCEDURE — 94640 AIRWAY INHALATION TREATMENT: CPT

## 2024-02-02 PROCEDURE — 82948 REAGENT STRIP/BLOOD GLUCOSE: CPT

## 2024-02-02 RX ORDER — METOLAZONE 2.5 MG/1
2.5 TABLET ORAL ONCE
Status: COMPLETED | OUTPATIENT
Start: 2024-02-02 | End: 2024-02-02

## 2024-02-02 RX ORDER — POTASSIUM CHLORIDE 20 MEQ/1
40 TABLET, EXTENDED RELEASE ORAL ONCE
Status: COMPLETED | OUTPATIENT
Start: 2024-02-02 | End: 2024-02-02

## 2024-02-02 RX ORDER — INSULIN GLARGINE 100 [IU]/ML
35 INJECTION, SOLUTION SUBCUTANEOUS
Status: DISCONTINUED | OUTPATIENT
Start: 2024-02-02 | End: 2024-02-03

## 2024-02-02 RX ADMIN — PANTOPRAZOLE SODIUM 40 MG: 40 TABLET, DELAYED RELEASE ORAL at 06:09

## 2024-02-02 RX ADMIN — ATORVASTATIN CALCIUM 40 MG: 40 TABLET, FILM COATED ORAL at 17:17

## 2024-02-02 RX ADMIN — INSULIN LISPRO 10 UNITS: 100 INJECTION, SOLUTION INTRAVENOUS; SUBCUTANEOUS at 12:44

## 2024-02-02 RX ADMIN — POTASSIUM CHLORIDE 40 MEQ: 1500 TABLET, EXTENDED RELEASE ORAL at 12:39

## 2024-02-02 RX ADMIN — DOCUSATE SODIUM 100 MG: 100 CAPSULE, LIQUID FILLED ORAL at 08:04

## 2024-02-02 RX ADMIN — BUDESONIDE 0.5 MG: 0.5 INHALANT ORAL at 19:32

## 2024-02-02 RX ADMIN — INSULIN LISPRO 10 UNITS: 100 INJECTION, SOLUTION INTRAVENOUS; SUBCUTANEOUS at 17:24

## 2024-02-02 RX ADMIN — POTASSIUM CHLORIDE 40 MEQ: 1500 TABLET, EXTENDED RELEASE ORAL at 08:04

## 2024-02-02 RX ADMIN — BUDESONIDE 0.5 MG: 0.5 INHALANT ORAL at 08:24

## 2024-02-02 RX ADMIN — INSULIN LISPRO 10 UNITS: 100 INJECTION, SOLUTION INTRAVENOUS; SUBCUTANEOUS at 08:09

## 2024-02-02 RX ADMIN — INSULIN LISPRO 2 UNITS: 100 INJECTION, SOLUTION INTRAVENOUS; SUBCUTANEOUS at 17:24

## 2024-02-02 RX ADMIN — INSULIN LISPRO 1 UNITS: 100 INJECTION, SOLUTION INTRAVENOUS; SUBCUTANEOUS at 08:09

## 2024-02-02 RX ADMIN — ESCITALOPRAM OXALATE 20 MG: 20 TABLET ORAL at 08:03

## 2024-02-02 RX ADMIN — DOCUSATE SODIUM 100 MG: 100 CAPSULE, LIQUID FILLED ORAL at 17:17

## 2024-02-02 RX ADMIN — TRAZODONE HYDROCHLORIDE 150 MG: 100 TABLET ORAL at 21:07

## 2024-02-02 RX ADMIN — INSULIN LISPRO 3 UNITS: 100 INJECTION, SOLUTION INTRAVENOUS; SUBCUTANEOUS at 12:43

## 2024-02-02 RX ADMIN — METOPROLOL SUCCINATE 100 MG: 50 TABLET, EXTENDED RELEASE ORAL at 08:03

## 2024-02-02 RX ADMIN — LORATADINE 10 MG: 10 TABLET ORAL at 08:03

## 2024-02-02 RX ADMIN — EMPAGLIFLOZIN 10 MG: 10 TABLET, FILM COATED ORAL at 08:07

## 2024-02-02 RX ADMIN — APIXABAN 5 MG: 5 TABLET, FILM COATED ORAL at 08:04

## 2024-02-02 RX ADMIN — INSULIN LISPRO 2 UNITS: 100 INJECTION, SOLUTION INTRAVENOUS; SUBCUTANEOUS at 21:09

## 2024-02-02 RX ADMIN — INSULIN GLARGINE 35 UNITS: 100 INJECTION, SOLUTION SUBCUTANEOUS at 21:07

## 2024-02-02 RX ADMIN — MONTELUKAST 10 MG: 10 TABLET, FILM COATED ORAL at 21:07

## 2024-02-02 RX ADMIN — Medication 0.5 MG/HR: at 17:30

## 2024-02-02 RX ADMIN — APIXABAN 5 MG: 5 TABLET, FILM COATED ORAL at 17:18

## 2024-02-02 RX ADMIN — METOLAZONE 2.5 MG: 2.5 TABLET ORAL at 12:39

## 2024-02-02 NOTE — PLAN OF CARE
Problem: DISCHARGE PLANNING  Goal: Discharge to home or other facility with appropriate resources  Description: INTERVENTIONS:  - Identify barriers to discharge w/patient and caregiver  - Arrange for needed discharge resources and transportation as appropriate  - Identify discharge learning needs (meds, wound care, etc.)  - Arrange for interpretive services to assist at discharge as needed  - Refer to Case Management Department for coordinating discharge planning if the patient needs post-hospital services based on physician/advanced practitioner order or complex needs related to functional status, cognitive ability, or social support system  2/2/2024 0315 by Gemma Mixon RN  Outcome: Progressing  2/2/2024 0231 by Gemma Mixon RN  Outcome: Progressing     Problem: Knowledge Deficit  Goal: Patient/family/caregiver demonstrates understanding of disease process, treatment plan, medications, and discharge instructions  Description: Complete learning assessment and assess knowledge base.  Interventions:  - Provide teaching at level of understanding  - Provide teaching via preferred learning methods  2/2/2024 0315 by Gemma Mixon RN  Outcome: Progressing  2/2/2024 0231 by Gemma Mixon RN  Outcome: Progressing     Problem: CARDIOVASCULAR - ADULT  Goal: Maintains optimal cardiac output and hemodynamic stability  Description: INTERVENTIONS:  - Monitor I/O, vital signs and rhythm  - Monitor for S/S and trends of decreased cardiac output  - Administer and titrate ordered vasoactive medications to optimize hemodynamic stability  - Assess quality of pulses, skin color and temperature  - Assess for signs of decreased coronary artery perfusion  - Instruct patient to report change in severity of symptoms  2/2/2024 0315 by Gemma Mixon RN  Outcome: Progressing  2/2/2024 0231 by Gemma Mixon RN  Outcome: Progressing  Goal: Absence of cardiac dysrhythmias or at baseline rhythm  Description: INTERVENTIONS:  - Continuous cardiac  monitoring, vital signs, obtain 12 lead EKG if ordered  - Administer antiarrhythmic and heart rate control medications as ordered  - Monitor electrolytes and administer replacement therapy as ordered  2/2/2024 0315 by Gemma Mixon RN  Outcome: Progressing  2/2/2024 0231 by Gemma Mixon RN  Outcome: Progressing     Problem: RESPIRATORY - ADULT  Goal: Achieves optimal ventilation and oxygenation  Description: INTERVENTIONS:  - Assess for changes in respiratory status  - Assess for changes in mentation and behavior  - Position to facilitate oxygenation and minimize respiratory effort  - Oxygen administered by appropriate delivery if ordered  - Initiate smoking cessation education as indicated  - Encourage broncho-pulmonary hygiene including cough, deep breathe, Incentive Spirometry  - Assess the need for suctioning and aspirate as needed  - Assess and instruct to report SOB or any respiratory difficulty  - Respiratory Therapy support as indicated  2/2/2024 0315 by Gmema Mixon RN  Outcome: Progressing  2/2/2024 0231 by Gemma Mixon RN  Outcome: Progressing

## 2024-02-02 NOTE — ASSESSMENT & PLAN NOTE
Lab Results   Component Value Date    HGBA1C 8.5 (H) 01/22/2024       Recent Labs     02/01/24  1604 02/01/24  1938 02/02/24  0714 02/02/24  1043   POCGLU 258* 242* 197* 312*         Blood Sugar Average: Last 72 hrs:  (P) [id number]  Currently uncontrolled, as per recent A1c  Home regimen consists of glargine 30 units at bedtime, insulin lispro 30 units with breakfast, 15 units with lunch and dinner, metformin 500 mg twice daily and Ozempic weekly  Continue with insulin glargine 30 units at bedtime  Increase to 35 units qhs on 2/2  Increased humalog to 10 units TID on 1/25  Additional coverage with sliding scale  Adjust regimen as needed  Carb consistent diet - unfortunately largely noncompliant.  Per nursing staff frequently asking for various snacks throughout the day  Frequent Accu-Cheks and hypoglycemia protocol

## 2024-02-02 NOTE — ASSESSMENT & PLAN NOTE
Recent Labs     01/31/24  0358 02/01/24  0534 02/02/24  0442   CREATININE 0.67 0.66 0.85   EGFR 92 92 72       Estimated Creatinine Clearance: 82.3 mL/min (by C-G formula based on SCr of 0.85 mg/dL).     Baseline creatinine-0.5-0.7  Etiology-prerenal azotemia in setting of volume overload/acute diastolic heart failure  Continue with IV Bumex drip  Slight bump in Cr today  Urinary retention protocol  Avoid nephrotoxic agents  Monitor BMP daily

## 2024-02-02 NOTE — ASSESSMENT & PLAN NOTE
Wt Readings from Last 3 Encounters:   02/02/24 119 kg (262 lb 14.4 oz)   11/09/23 129 kg (284 lb 12.8 oz)   09/20/23 128 kg (283 lb)     Appears volume overloaded  Worsening shortness of breath with bilateral lower extremity edema  Chest x-ray-pulmonary venous congestion  BNP-174, in obese patient  Echo 08/23-preserved EF, diastolic dysfunction, right ventricular systolic pressure 61  Home regimen-Bumex 2 mg twice daily  Continue with IV Bumex drip  Cr stable, good urine output and weight decreased  Metolazone added 2/1  Fluid restriction 1500 mL  Unfortunately patient has been noncompliant with both fluid and salt restriction despite extensive education  Salt restriction  Strict I's and O's  Appreciate Cardiology input

## 2024-02-02 NOTE — PROGRESS NOTES
Cardiology Progress Note   Mattie Joy 63 y.o. female MRN: [id number]    Unit/Bed#: -01 Encounter: [id number]    Assessment:  Acute on chronic respiratory failure  Baseline oxygen requirements 4 L nasal cannula  Initially on admission patient noted to be hypoxic with saturations in the upper 80s while on 5 L nasal cannula, she has at maximum this admission thus far required 9-10 L but was able to wean down as of 1/23/2024  Etiology multifactorial in light of congestive heart failure as well as COPD exacerbation  Acute on chronic diastolic heart failure  8/15/2023 echo: LVEF 65%, grade 2 diastolic dysfunction, severely elevated RV systolic pressure 61 mmHg  Prehospital diuretic: Bumex 4 twice daily with 40 potassium daily  Prior weight as low as 278 in August 2023 at which time she was reportedly euvolemic  Paroxysmal atrial fibrillation  Patient in atrial fibrillation on arrival with rates in the low 100s  Thromboembolic PPx: Eliquis 5 twice daily  Rate control: Toprol- daily  COPD with acute exacerbation  Type 2 diabetes  Obesity, BMI 51  Pulmonary hypertension likely group 2/3 from left heart disease, underlying lung disease, OHV  Acute kidney injury, resolved    Plan:  Patient's main issue continues to be noncompliance with salt and fluid restriction leading to multiple hospitalizations in the past. She was found during this admission (and prior hospitalizations) to be drinking from her room faucet and eating salty snacks brought in from outside the hospital despite extensive counseling.  She continues on IV Bumex drip with 2.5mg metolazone x1 yesterday. She is -7.7L over the past 24 hours and -44 L since admission per I&O charting. Repeat BMP this AM shows K+ of 2.9. Kidney function overall stable. On IV/PO replenishment today. Recheck tomorrow. Plan to give additional 2.5 mg of metolazone today and follow-up on BMP tomorrow.    Plan to continue IV Bumex drip with PRN metolazone until her creatinine  "starts to uptrend. She will need increased home dose diuretics as she is likely to be noncompliant with her dietary restrictions at home. Recommend Bumex 4 mg twice daily with metolazone 2.5 mg Monday, Wednesday and Friday. K-Dur 40 meq daily with extra 40 meq (80meq total) on metolazone days. She will need close outpatient follow up and BMP (within 1 week) post-dc.   Continue remainder of cardiac Rx as above.   Cardiology will see again 2/5/2024 unless notified. Please call with any questions or concerns in the meantime.     Subjective:   Patient seen and examined. Feels fine. Good urine output overnight.     Objective:     Vitals: Blood pressure 107/70, pulse (!) 106, temperature 97.7 °F (36.5 °C), resp. rate 18, height 5' 3\" (1.6 m), weight 119 kg (262 lb 14.4 oz), SpO2 93%, not currently breastfeeding., Body mass index is 46.57 kg/m².,   Orthostatic Blood Pressures      Flowsheet Row Most Recent Value   Blood Pressure 107/70 filed at 02/02/2024 1127   Patient Position - Orthostatic VS Sitting filed at 02/02/2024 0733              Intake/Output Summary (Last 24 hours) at 2/2/2024 1202  Last data filed at 2/2/2024 0900  Gross per 24 hour   Intake 2340 ml   Output 9800 ml   Net -7460 ml         Physical Exam:    GEN: Mattie Joy appears well, alert and oriented x 3, pleasant and cooperative   HEENT: Sclera anicteric, conjunctivae pink, mucous membranes moist. Oropharynx clear.   NECK: supple, no significant JVD, Trachea midline, no thyromegaly.   HEART: regular rhythm, normal S1 and S2, no murmurs, clicks, gallops or rubs   LUNGS: clear to auscultation bilaterally; no wheezes, rales, or rhonchi. No increased work of breathing or signs of respiratory distress.   ABDOMEN: Soft, nontender, nondistended  EXTREMITIES: Skin warm and well perfused, no clubbing, cyanosis, or edema.  NEURO: No focal findings. Normal speech. Mood and affect normal.   SKIN: Normal without suspicious lesions on exposed " skin.      Medications:      Current Facility-Administered Medications:     acetaminophen (TYLENOL) tablet 650 mg, 650 mg, Oral, Q6H PRN, Rosamaria Miller PA-C, 650 mg at 02/01/24 1625    albuterol (PROVENTIL HFA,VENTOLIN HFA) inhaler 2 puff, 2 puff, Inhalation, Q4H PRN, Hawa Marsh MD, 2 puff at 01/23/24 1520    apixaban (ELIQUIS) tablet 5 mg, 5 mg, Oral, BID, Hawa Marsh MD, 5 mg at 02/02/24 0804    atorvastatin (LIPITOR) tablet 40 mg, 40 mg, Oral, Daily With Dinner, Hawa Marsh MD, 40 mg at 02/01/24 1709    budesonide (PULMICORT) inhalation solution 0.5 mg, 0.5 mg, Nebulization, Q12H, Hawa Marsh MD, 0.5 mg at 02/02/24 0824    bumetanide (BUMEX) 12.5 mg infusion 50 mL, 0.5 mg/hr, Intravenous, Continuous, Rosamaria Miller PA-C, Last Rate: 2 mL/hr at 02/01/24 1940, 0.5 mg/hr at 02/01/24 1940    docusate sodium (COLACE) capsule 100 mg, 100 mg, Oral, BID, Hawa Marsh MD, 100 mg at 02/02/24 0804    Empagliflozin (JARDIANCE) tablet 10 mg, 10 mg, Oral, Daily, Chuck Harrell PA-C, 10 mg at 02/02/24 0807    escitalopram (LEXAPRO) tablet 20 mg, 20 mg, Oral, Daily, Hawa Marsh MD, 20 mg at 02/02/24 0803    insulin glargine (LANTUS) subcutaneous injection 35 Units 0.35 mL, 35 Units, Subcutaneous, HS, Rupal Mercedes PA-C    insulin lispro (HumaLOG) 100 units/mL subcutaneous injection 1-5 Units, 1-5 Units, Subcutaneous, TID AC, 1 Units at 02/02/24 0809 **AND** Fingerstick Glucose (POCT), , , TID AC, Hawa Marsh MD    insulin lispro (HumaLOG) 100 units/mL subcutaneous injection 1-5 Units, 1-5 Units, Subcutaneous, HS, Hawa Marsh MD, 2 Units at 02/01/24 2132    insulin lispro (HumaLOG) 100 units/mL subcutaneous injection 10 Units, 10 Units, Subcutaneous, TID With Meals, Rupal Mercedes PA-C, 10 Units at 02/02/24 0809    loratadine (CLARITIN) tablet 10 mg, 10 mg, Oral, Daily, Hawa Marsh MD, 10 mg at 02/02/24 0803    metolazone (ZAROXOLYN) tablet 2.5 mg, 2.5 mg, Oral, Once **AND** potassium chloride (Klor-Con M20) CR tablet 40  mEq, 40 mEq, Oral, Once, Fran John PA-C    metoprolol succinate (TOPROL-XL) 24 hr tablet 100 mg, 100 mg, Oral, Daily, Hawa Marsh MD, 100 mg at 02/02/24 0803    montelukast (SINGULAIR) tablet 10 mg, 10 mg, Oral, HS, Hawa Marsh MD, 10 mg at 02/01/24 2129    pantoprazole (PROTONIX) EC tablet 40 mg, 40 mg, Oral, Daily Before Breakfast, Hawa Marsh MD, 40 mg at 02/02/24 0609    potassium chloride (K-DUR,KLOR-CON) CR tablet 40 mEq, 40 mEq, Oral, Daily, Hawa Marsh MD, 40 mEq at 02/02/24 0804    potassium chloride (Klor-Con M20) CR tablet 40 mEq, 40 mEq, Oral, Once, Rupal Mercedes PA-C    traZODone (DESYREL) tablet 150 mg, 150 mg, Oral, HS, Hawa Marsh MD, 150 mg at 02/01/24 2129     Labs & Results:        Results from last 7 days   Lab Units 01/31/24  0358 01/27/24  0506   WBC Thousand/uL 11.78* 10.61*   HEMOGLOBIN g/dL 10.3* 9.0*   HEMATOCRIT % 36.6 31.9*   PLATELETS Thousands/uL 318 281         Results from last 7 days   Lab Units 02/02/24  0442 02/01/24  0534 01/31/24  0358   POTASSIUM mmol/L 2.9* 3.3* 3.4*   CHLORIDE mmol/L 80* 92* 93*   CO2 mmol/L 39* 41* 37*   BUN mg/dL 28* 19 18   CREATININE mg/dL 0.85 0.66 0.67   CALCIUM mg/dL 9.0 8.3* 8.1*         Results from last 7 days   Lab Units 01/27/24  0506   MAGNESIUM mg/dL 2.5

## 2024-02-02 NOTE — PROGRESS NOTES
[facility]  Progress Note  Name: Mattie Joy I  MRN: [id number]  Unit/Bed#: -01 I Date of Admission: 1/22/2024   Date of Service: 2/2/2024 I Hospital Day: 11    Assessment/Plan   * Acute on chronic hypercapnic respiratory failure (HCC)  Assessment & Plan  Baseline oxygen requirement 4 L continuously  Underlying severe COPD, severe pulmonary hypertension and diastolic heart failure  BNP-174  COVID/respiratory panel-negative  Chest x-ray consistent with pulmonary venous congestion, final reading pending  IV Solu-Medrol discontinued  Continue with IV Bumex drip - Metolazone added 2/1  Continue with telemetry  Appreciate Cardiology and Pulmonology recommendations  Respiratory protocol  Monitor volume status  Strict I's and O's  Fluid restriction  Weaned to baseline of 4 L nasal cannula    IVELISSE (acute kidney injury) (HCC)  Assessment & Plan  Recent Labs     01/31/24  0358 02/01/24  0534 02/02/24  0442   CREATININE 0.67 0.66 0.85   EGFR 92 92 72       Estimated Creatinine Clearance: 82.3 mL/min (by C-G formula based on SCr of 0.85 mg/dL).     Baseline creatinine-0.5-0.7  Etiology-prerenal azotemia in setting of volume overload/acute diastolic heart failure  Continue with IV Bumex drip  Slight bump in Cr today  Urinary retention protocol  Avoid nephrotoxic agents  Monitor BMP daily    COPD, severe (HCC)  Assessment & Plan  Home regimen consists of benralizumab, Singulair, Pulmicort nebulization twice daily, Xopenex and albuterol as needed, Bevespi twice daily  Was seen at pulmonology office on 1/22, had respiratory distress, was given prednisone taper and urged to go to ED for evaluation  Status post IV Solu-Medrol 125 mg in ED  IV Solu-Medrol 40mg discontinued  Respiratory protocol  Continue with Pulmicort nebulization twice daily  Albuterol and Xopenex as needed  Singulair 10 mg daily  Inpatient consult to Pulmonology -no further inpatient recommendations      Eosinophilic  asthma  Assessment & Plan  On benralizumab every 8 weeks  Follows with Pulmonology    Morbid obesity ([facility])  Assessment & Plan  Counseling about adaptation of healthy dietary habits and lifestyle modifications, once medically stable  BMI >50 on admission - decreasing with diuresis   Affects all aspects of care    Pulmonary hypertension ([facility])  Assessment & Plan  As per recent echo in 08/23, right ventricular systolic pressure is severely elevated at 61 mmHg  Continue with IV Bumex drip  Metolazone added 2/1  Continue to monitor volume status clinically    Acute on chronic heart failure with preserved ejection fraction (HCC)  Assessment & Plan  Wt Readings from Last 3 Encounters:   02/02/24 119 kg (262 lb 14.4 oz)   11/09/23 129 kg (284 lb 12.8 oz)   09/20/23 128 kg (283 lb)     Appears volume overloaded  Worsening shortness of breath with bilateral lower extremity edema  Chest x-ray-pulmonary venous congestion  BNP-174, in obese patient  Echo 08/23-preserved EF, diastolic dysfunction, right ventricular systolic pressure 61  Home regimen-Bumex 2 mg twice daily  Continue with IV Bumex drip  Cr stable, good urine output and weight decreased  Metolazone added 2/1  Fluid restriction 1500 mL  Unfortunately patient has been noncompliant with both fluid and salt restriction despite extensive education  Salt restriction  Strict I's and O's  Appreciate Cardiology input    Paroxysmal atrial fibrillation ([facility])  Assessment & Plan  Rate control with Toprol- mg daily  Tachycardic in to the 130s this AM prior to morning dose metoprolol  Anticoagulated with Eliquis 5 mg twice daily    Type 2 diabetes mellitus with stage 2 chronic kidney disease, with long-term current use of insulin ([facility])  Assessment & Plan  Lab Results   Component Value Date    HGBA1C 8.5 (H) 01/22/2024       Recent Labs     02/01/24  1604 02/01/24  1938 02/02/24  0714 02/02/24  1043   POCGLU 258* 242* 197* 312*         Blood Sugar Average: Last 72 hrs:  (P)  [id number]  Currently uncontrolled, as per recent A1c  Home regimen consists of glargine 30 units at bedtime, insulin lispro 30 units with breakfast, 15 units with lunch and dinner, metformin 500 mg twice daily and Ozempic weekly  Continue with insulin glargine 30 units at bedtime  Increase to 35 units qhs on 2/2  Increased humalog to 10 units TID on 1/25  Additional coverage with sliding scale  Adjust regimen as needed  Carb consistent diet - unfortunately largely noncompliant.  Per nursing staff frequently asking for various snacks throughout the day  Frequent Accu-Cheks and hypoglycemia protocol    Hypercholesterolemia  Assessment & Plan  Continue with atorvastatin 40 mg daily at bedtime    Obstructive sleep apnea  Assessment & Plan  Uses CPAP at home  Follows with Pulmonology on outpatient, awaiting BiPAP study with transcutaneous oxygen monitoring  Continue with BiPAP while inpatient    Esophageal reflux  Assessment & Plan  Continue with Protonix         VTE Pharmacologic Prophylaxis: VTE Score: 8 High Risk (Score >/= 5) - Pharmacological DVT Prophylaxis Ordered: apixaban (Eliquis). Sequential Compression Devices Ordered.    Mobility:   Basic Mobility Inpatient Raw Score: 17  JH-HLM Goal: 5: Stand one or more mins  JH-HLM Achieved: 6: Walk 10 steps or more  HLM Goal achieved. Continue to encourage appropriate mobility.    Patient Centered Rounds: I performed bedside rounds with nursing staff today.   Discussions with Specialists or Other Care Team Provider: POLO    Education and Discussions with Family / Patient: Patient declined call to .     Total Time Spent on Date of Encounter in care of patient: 40 mins. This time was spent on one or more of the following: performing physical exam; counseling and coordination of care; obtaining or reviewing history; documenting in the medical record; reviewing/ordering tests, medications or procedures; communicating with other healthcare professionals  and discussing with patient's family/caregivers.    Current Length of Stay: 11 day(s)  Current Patient Status: Inpatient   Certification Statement: The patient will continue to require additional inpatient hospital stay due to IV diuresis  Discharge Plan: Anticipate discharge in >72 hrs to home with home services.    Code Status: Level 1 - Full Code    Subjective:   Patient feels well, believes her shortness of breath continues to improve.  Denies chest pain/palpitations, nausea/vomiting, abdominal pain.    Objective:     Vitals:   Temp (24hrs), Av.9 °F (36.6 °C), Min:97.7 °F (36.5 °C), Max:98.2 °F (36.8 °C)    Temp:  [97.7 °F (36.5 °C)-98.2 °F (36.8 °C)] 97.7 °F (36.5 °C)  HR:  [101-134] 106  Resp:  [18-21] 18  BP: (107-116)/(70-80) 107/70  SpO2:  [91 %-99 %] 93 %  Body mass index is 46.57 kg/m².     Input and Output Summary (last 24 hours):     Intake/Output Summary (Last 24 hours) at 2024 1158  Last data filed at 2024 0900  Gross per 24 hour   Intake 2340 ml   Output 9800 ml   Net -7460 ml       Physical Exam:   Physical Exam  Vitals and nursing note reviewed.   Constitutional:       Appearance: Normal appearance. She is obese.      Interventions: Nasal cannula in place.      Comments: No acute distress   HENT:      Head: Normocephalic.   Eyes:      General: No scleral icterus.     Extraocular Movements: Extraocular movements intact.      Conjunctiva/sclera: Conjunctivae normal.   Cardiovascular:      Rate and Rhythm: Tachycardia present. Rhythm irregularly irregular.   Pulmonary:      Breath sounds: Decreased breath sounds present. No wheezing, rhonchi or rales.   Abdominal:      General: Bowel sounds are normal.      Palpations: Abdomen is soft.      Tenderness: There is no abdominal tenderness. There is no guarding or rebound.   Musculoskeletal:         General: No swelling, tenderness or deformity.      Cervical back: Normal range of motion.      Comments: Trace LE edema bilaterally   Skin:      General: Skin is warm and dry.   Neurological:      General: No focal deficit present.      Mental Status: She is alert and oriented to person, place, and time.   Psychiatric:         Mood and Affect: Mood normal.         Speech: Speech normal.         Behavior: Behavior normal.          Additional Data:     Labs:  Results from last 7 days   Lab Units 01/31/24  0358   WBC Thousand/uL 11.78*   HEMOGLOBIN g/dL 10.3*   HEMATOCRIT % 36.6   PLATELETS Thousands/uL 318   NEUTROS PCT % 74   LYMPHS PCT % 17   MONOS PCT % 8   EOS PCT % 0     Results from last 7 days   Lab Units 02/02/24  0442   SODIUM mmol/L 140   POTASSIUM mmol/L 2.9*   CHLORIDE mmol/L 80*   CO2 mmol/L 39*   BUN mg/dL 28*   CREATININE mg/dL 0.85   ANION GAP mmol/L 21   CALCIUM mg/dL 9.0   GLUCOSE RANDOM mg/dL 213*         Results from last 7 days   Lab Units 02/02/24  1043 02/02/24  0714 02/01/24  1938 02/01/24  1604 02/01/24  1106 02/01/24  0738 01/31/24  2106 01/31/24  1557 01/31/24  1115 01/31/24  0635 01/30/24  1944 01/30/24  1625   POC GLUCOSE mg/dl 312* 197* 242* 258* 211* 173* 222* 194* 171* 150* 159* 124               Lines/Drains:  Invasive Devices       Peripheral Intravenous Line  Duration             Peripheral IV 01/30/24 Left;Ventral (anterior) Forearm 2 days                      Telemetry:  Telemetry Orders (From admission, onward)               24 Hour Telemetry Monitoring  Continuous x 24 Hours (Telem)        Question:  Reason for 24 Hour Telemetry  Answer:  Decompensated CHF- and any one of the following: continuous diuretic infusion or total diuretic dose >200 mg daily, associated electrolyte derangement (I.e. K < 3.0), ionotropic drip (continuous infusion), hx of ventricular arrhythmia, or new EF < 35%                     Telemetry Reviewed: Atrial fibrillation. HR averaging 110  Indication for Continued Telemetry Use: Acute CHF on >200 mg lasix/day or equivalent dose or with new reduced EF.              Imaging: No pertinent imaging  reviewed.    Recent Cultures (last 7 days):         Last 24 Hours Medication List:   Current Facility-Administered Medications   Medication Dose Route Frequency Provider Last Rate    acetaminophen  650 mg Oral Q6H PRN Rosamaria Miller PA-C      albuterol  2 puff Inhalation Q4H PRN Hawa Marsh MD      apixaban  5 mg Oral BID Hawa Marsh MD      atorvastatin  40 mg Oral Daily With Dinner Hawa Marsh MD      budesonide  0.5 mg Nebulization Q12H Hawa Marsh MD      bumetanide (BUMEX) 12.5 mg infusion 50 mL  0.5 mg/hr Intravenous Continuous Rosamaria Miller PA-C 0.5 mg/hr (02/01/24 1940)    docusate sodium  100 mg Oral BID Hawa Marsh MD      Empagliflozin  10 mg Oral Daily Chuck Harrell PA-C      escitalopram  20 mg Oral Daily Hawa Marsh MD      insulin glargine  35 Units Subcutaneous HS Rupal Mercedes PA-C      insulin lispro  1-5 Units Subcutaneous TID AC Hawa Marsh MD      insulin lispro  1-5 Units Subcutaneous HS Hawa Marsh MD      insulin lispro  10 Units Subcutaneous TID With Meals Rupal Mercedes PA-C      loratadine  10 mg Oral Daily Hawa Marsh MD      metolazone  2.5 mg Oral Once Fran John PA-C      And    potassium chloride  40 mEq Oral Once Fran John PA-C      metoprolol succinate  100 mg Oral Daily Hawa Marsh MD      montelukast  10 mg Oral HS Hawa Marsh MD      pantoprazole  40 mg Oral Daily Before Breakfast Hawa Marsh MD      potassium chloride  40 mEq Oral Daily Hawa Marsh MD      potassium chloride  40 mEq Oral Once Rupal Mercedes PA-C      traZODone  150 mg Oral HS Hawa Marsh MD          Today, Patient Was Seen By: Rupal Mercedes PA-C    **Please Note: This note may have been constructed using a voice recognition system.**

## 2024-02-02 NOTE — PROGRESS NOTES
24 1200   Clinical Encounter Type   Visited With Patient   Routine Visit Follow-up      Pastoral Care Progress Note    2024  Patient: Mattie Joy : 1960  Admission Date & Time: 2024 1515  MRN: [id number] CSN: [id number]           follow-up. Confirmed that home  did visit and will visit when she returns home. Encouraged Mattie to follow medical instructions and care for herself so that she can return home. Chaplains remain available.

## 2024-02-02 NOTE — ASSESSMENT & PLAN NOTE
Rate control with Toprol- mg daily  Tachycardic in to the 130s this AM prior to morning dose metoprolol  Anticoagulated with Eliquis 5 mg twice daily

## 2024-02-03 LAB
ANION GAP SERPL CALCULATED.3IONS-SCNC: 14 MMOL/L
BUN SERPL-MCNC: 39 MG/DL (ref 5–25)
CALCIUM SERPL-MCNC: 7.8 MG/DL (ref 8.4–10.2)
CHLORIDE SERPL-SCNC: 80 MMOL/L (ref 96–108)
CO2 SERPL-SCNC: 40 MMOL/L (ref 21–32)
CREAT SERPL-MCNC: 0.83 MG/DL (ref 0.6–1.3)
GFR SERPL CREATININE-BSD FRML MDRD: 74 ML/MIN/1.73SQ M
GLUCOSE SERPL-MCNC: 208 MG/DL (ref 65–140)
GLUCOSE SERPL-MCNC: 208 MG/DL (ref 65–140)
GLUCOSE SERPL-MCNC: 239 MG/DL (ref 65–140)
GLUCOSE SERPL-MCNC: 323 MG/DL (ref 65–140)
GLUCOSE SERPL-MCNC: 323 MG/DL (ref 65–140)
GLUCOSE SERPL-MCNC: 492 MG/DL (ref 65–140)
MAGNESIUM SERPL-MCNC: 2.6 MG/DL (ref 1.9–2.7)
POTASSIUM SERPL-SCNC: 2.7 MMOL/L (ref 3.5–5.3)
SODIUM SERPL-SCNC: 134 MMOL/L (ref 135–147)

## 2024-02-03 PROCEDURE — 82948 REAGENT STRIP/BLOOD GLUCOSE: CPT

## 2024-02-03 PROCEDURE — 94660 CPAP INITIATION&MGMT: CPT

## 2024-02-03 PROCEDURE — 83735 ASSAY OF MAGNESIUM: CPT | Performed by: PHYSICIAN ASSISTANT

## 2024-02-03 PROCEDURE — 99232 SBSQ HOSP IP/OBS MODERATE 35: CPT | Performed by: PHYSICIAN ASSISTANT

## 2024-02-03 PROCEDURE — 80048 BASIC METABOLIC PNL TOTAL CA: CPT | Performed by: INTERNAL MEDICINE

## 2024-02-03 PROCEDURE — 94640 AIRWAY INHALATION TREATMENT: CPT

## 2024-02-03 PROCEDURE — 94760 N-INVAS EAR/PLS OXIMETRY 1: CPT

## 2024-02-03 RX ORDER — ONDANSETRON 2 MG/ML
4 INJECTION INTRAMUSCULAR; INTRAVENOUS EVERY 4 HOURS PRN
Status: DISCONTINUED | OUTPATIENT
Start: 2024-02-03 | End: 2024-02-09 | Stop reason: HOSPADM

## 2024-02-03 RX ORDER — INSULIN LISPRO 100 [IU]/ML
12 INJECTION, SOLUTION INTRAVENOUS; SUBCUTANEOUS
Status: DISCONTINUED | OUTPATIENT
Start: 2024-02-03 | End: 2024-02-05

## 2024-02-03 RX ORDER — POTASSIUM CHLORIDE 14.9 MG/ML
20 INJECTION INTRAVENOUS
Qty: 200 ML | Refills: 0 | Status: COMPLETED | OUTPATIENT
Start: 2024-02-03 | End: 2024-02-03

## 2024-02-03 RX ORDER — POTASSIUM CHLORIDE 20 MEQ/1
40 TABLET, EXTENDED RELEASE ORAL 2 TIMES DAILY WITH MEALS
Status: DISCONTINUED | OUTPATIENT
Start: 2024-02-03 | End: 2024-02-03

## 2024-02-03 RX ORDER — INSULIN GLARGINE 100 [IU]/ML
40 INJECTION, SOLUTION SUBCUTANEOUS
Status: DISCONTINUED | OUTPATIENT
Start: 2024-02-03 | End: 2024-02-05

## 2024-02-03 RX ORDER — POTASSIUM CHLORIDE 20 MEQ/1
40 TABLET, EXTENDED RELEASE ORAL 2 TIMES DAILY WITH MEALS
Status: DISCONTINUED | OUTPATIENT
Start: 2024-02-03 | End: 2024-02-05

## 2024-02-03 RX ORDER — INSULIN LISPRO 100 [IU]/ML
15 INJECTION, SOLUTION INTRAVENOUS; SUBCUTANEOUS ONCE
Status: COMPLETED | OUTPATIENT
Start: 2024-02-03 | End: 2024-02-03

## 2024-02-03 RX ADMIN — INSULIN LISPRO 3 UNITS: 100 INJECTION, SOLUTION INTRAVENOUS; SUBCUTANEOUS at 17:11

## 2024-02-03 RX ADMIN — BUDESONIDE 0.5 MG: 0.5 INHALANT ORAL at 21:09

## 2024-02-03 RX ADMIN — INSULIN LISPRO 10 UNITS: 100 INJECTION, SOLUTION INTRAVENOUS; SUBCUTANEOUS at 12:11

## 2024-02-03 RX ADMIN — INSULIN LISPRO 12 UNITS: 100 INJECTION, SOLUTION INTRAVENOUS; SUBCUTANEOUS at 17:11

## 2024-02-03 RX ADMIN — APIXABAN 5 MG: 5 TABLET, FILM COATED ORAL at 07:49

## 2024-02-03 RX ADMIN — TRAZODONE HYDROCHLORIDE 150 MG: 100 TABLET ORAL at 22:46

## 2024-02-03 RX ADMIN — INSULIN GLARGINE 40 UNITS: 100 INJECTION, SOLUTION SUBCUTANEOUS at 22:52

## 2024-02-03 RX ADMIN — POTASSIUM CHLORIDE 20 MEQ: 14.9 INJECTION, SOLUTION INTRAVENOUS at 09:00

## 2024-02-03 RX ADMIN — DOCUSATE SODIUM 100 MG: 100 CAPSULE, LIQUID FILLED ORAL at 07:49

## 2024-02-03 RX ADMIN — ACETAMINOPHEN 325MG 650 MG: 325 TABLET ORAL at 07:48

## 2024-02-03 RX ADMIN — DOCUSATE SODIUM 100 MG: 100 CAPSULE, LIQUID FILLED ORAL at 17:13

## 2024-02-03 RX ADMIN — POTASSIUM CHLORIDE 40 MEQ: 1500 TABLET, EXTENDED RELEASE ORAL at 17:13

## 2024-02-03 RX ADMIN — INSULIN LISPRO 2 UNITS: 100 INJECTION, SOLUTION INTRAVENOUS; SUBCUTANEOUS at 07:54

## 2024-02-03 RX ADMIN — EMPAGLIFLOZIN 10 MG: 10 TABLET, FILM COATED ORAL at 07:49

## 2024-02-03 RX ADMIN — APIXABAN 5 MG: 5 TABLET, FILM COATED ORAL at 17:13

## 2024-02-03 RX ADMIN — MONTELUKAST 10 MG: 10 TABLET, FILM COATED ORAL at 22:46

## 2024-02-03 RX ADMIN — INSULIN LISPRO 10 UNITS: 100 INJECTION, SOLUTION INTRAVENOUS; SUBCUTANEOUS at 07:55

## 2024-02-03 RX ADMIN — POTASSIUM CHLORIDE 40 MEQ: 1500 TABLET, EXTENDED RELEASE ORAL at 07:47

## 2024-02-03 RX ADMIN — ESCITALOPRAM OXALATE 20 MG: 20 TABLET ORAL at 07:48

## 2024-02-03 RX ADMIN — ACETAMINOPHEN 325MG 650 MG: 325 TABLET ORAL at 00:16

## 2024-02-03 RX ADMIN — ONDANSETRON 4 MG: 2 INJECTION INTRAMUSCULAR; INTRAVENOUS at 19:00

## 2024-02-03 RX ADMIN — LORATADINE 10 MG: 10 TABLET ORAL at 07:48

## 2024-02-03 RX ADMIN — BUDESONIDE 0.5 MG: 0.5 INHALANT ORAL at 07:41

## 2024-02-03 RX ADMIN — POTASSIUM CHLORIDE 20 MEQ: 14.9 INJECTION, SOLUTION INTRAVENOUS at 07:26

## 2024-02-03 RX ADMIN — INSULIN LISPRO 1 UNITS: 100 INJECTION, SOLUTION INTRAVENOUS; SUBCUTANEOUS at 22:52

## 2024-02-03 RX ADMIN — INSULIN LISPRO 3 UNITS: 100 INJECTION, SOLUTION INTRAVENOUS; SUBCUTANEOUS at 12:10

## 2024-02-03 RX ADMIN — INSULIN LISPRO 15 UNITS: 100 INJECTION, SOLUTION INTRAVENOUS; SUBCUTANEOUS at 18:38

## 2024-02-03 RX ADMIN — PANTOPRAZOLE SODIUM 40 MG: 40 TABLET, DELAYED RELEASE ORAL at 05:03

## 2024-02-03 RX ADMIN — ATORVASTATIN CALCIUM 40 MG: 40 TABLET, FILM COATED ORAL at 17:13

## 2024-02-03 RX ADMIN — METOPROLOL SUCCINATE 100 MG: 50 TABLET, EXTENDED RELEASE ORAL at 07:48

## 2024-02-03 RX ADMIN — Medication 0.5 MG/HR: at 15:58

## 2024-02-03 NOTE — ASSESSMENT & PLAN NOTE
As per recent echo in 08/23, right ventricular systolic pressure is severely elevated at 61 mmHg  Continue with IV Bumex drip  Metolazone added 2/1 x1  Continue to monitor volume status clinically

## 2024-02-03 NOTE — PROGRESS NOTES
[facility]  Progress Note  Name: Mattie Joy I  MRN: [id number]  Unit/Bed#: -01 I Date of Admission: 1/22/2024   Date of Service: 2/3/2024 I Hospital Day: 12    Assessment/Plan   * Acute on chronic hypercapnic respiratory failure (HCC)  Assessment & Plan  Baseline oxygen requirement 4 L continuously  Underlying severe COPD, severe pulmonary hypertension and diastolic heart failure  BNP-174  COVID/respiratory panel-negative  Chest x-ray consistent with pulmonary venous congestion, final reading pending  IV Solu-Medrol discontinued  Continue with IV Bumex drip - Metolazone added 2/1  Continue with telemetry  Appreciate Cardiology and Pulmonology recommendations  Respiratory protocol  Monitor volume status  Strict I's and O's  Fluid restriction  Resolved - weaned to baseline of 4 L nasal cannula    COPD, severe (HCC)  Assessment & Plan  Home regimen consists of benralizumab, Singulair, Pulmicort nebulization twice daily, Xopenex and albuterol as needed, Bevespi twice daily  Was seen at pulmonology office on 1/22, had respiratory distress, was given prednisone taper and urged to go to ED for evaluation  Status post IV Solu-Medrol 125 mg in ED  IV Solu-Medrol 40mg discontinued  Respiratory protocol  Continue with Pulmicort nebulization twice daily  Albuterol and Xopenex as needed  Singulair 10 mg daily  Inpatient consult to Pulmonology -no further inpatient recommendations      Morbid obesity (HCC)  Assessment & Plan  Counseling about adaptation of healthy dietary habits and lifestyle modifications, once medically stable  BMI >50 on admission - decreasing with diuresis   Affects all aspects of care    Pulmonary hypertension (HCC)  Assessment & Plan  As per recent echo in 08/23, right ventricular systolic pressure is severely elevated at 61 mmHg  Continue with IV Bumex drip  Metolazone added 2/1 x1  Continue to monitor volume status clinically    Acute on chronic heart failure with  preserved ejection fraction (HCC)  Assessment & Plan  Wt Readings from Last 3 Encounters:   02/03/24 118 kg (261 lb 1.6 oz)   11/09/23 129 kg (284 lb 12.8 oz)   09/20/23 128 kg (283 lb)     Appears volume overloaded  Worsening shortness of breath with bilateral lower extremity edema  Chest x-ray-pulmonary venous congestion  BNP-174, in obese patient  Echo 08/23-preserved EF, diastolic dysfunction, right ventricular systolic pressure 61  Home regimen-Bumex 2 mg twice daily  Continue with IV Bumex drip  Cr stable, good urine output and weight decreased  Metolazone added 2/1 x1 - per cardiology note 2/2 plan to add MWF on discharge with increased dose of oral bumex   Fluid restriction 1500 mL  Unfortunately patient has been noncompliant with both fluid and salt restriction despite extensive education  Salt restriction  Strict I's and O's  Appreciate Cardiology input    Paroxysmal atrial fibrillation (HCC)  Assessment & Plan  Rate control with Toprol- mg daily  Anticoagulated with Eliquis 5 mg twice daily    Hypokalemia  Assessment & Plan  Potassium 2.7 in setting of IV diuresis  S/p oral potassium 40 mEq x1 this morning in addition to 20 mEq IV x2 doses  Will give additional dose 40 mEq this evening  Trend BMP  Monitor on telemetry  Check magnesium    Type 2 diabetes mellitus with stage 2 chronic kidney disease, with long-term current use of insulin (HCC)  Assessment & Plan  Lab Results   Component Value Date    HGBA1C 8.5 (H) 01/22/2024       Recent Labs     02/02/24  1600 02/02/24  2106 02/03/24  0743 02/03/24  1120   POCGLU 267* 262* 239* 323*         Blood Sugar Average: Last 72 hrs:  (P) 230.9052317761462318  Currently uncontrolled, as per recent A1c  Home regimen consists of glargine 30 units at bedtime, insulin lispro 30 units with breakfast, 15 units with lunch and dinner, metformin 500 mg twice daily and Ozempic weekly  Continue with insulin glargine 30 units at bedtime  Increase to 40 units qhs on  2/3  Increased humalog to 12 units TID on 2/3  Additional coverage with sliding scale  Adjust regimen as needed  Carb consistent diet - unfortunately extremely noncompliant.  Per nursing staff frequently asking for various snacks throughout the day  Frequent Accu-Cheks and hypoglycemia protocol    Hypercholesterolemia  Assessment & Plan  Continue with atorvastatin 40 mg daily at bedtime    Obstructive sleep apnea  Assessment & Plan  Uses CPAP at home  Follows with Pulmonology on outpatient, awaiting BiPAP study with transcutaneous oxygen monitoring  Continue with BiPAP while inpatient    Esophageal reflux  Assessment & Plan  Continue with Protonix         VTE Pharmacologic Prophylaxis: VTE Score: 8 High Risk (Score >/= 5) - Pharmacological DVT Prophylaxis Ordered: apixaban (Eliquis). Sequential Compression Devices Ordered.    Mobility:   Basic Mobility Inpatient Raw Score: 20  JH-HLM Goal: 6: Walk 10 steps or more  JH-HLM Achieved: 7: Walk 25 feet or more  HLM Goal achieved. Continue to encourage appropriate mobility.    Patient Centered Rounds: I performed bedside rounds with nursing staff today.   Discussions with Specialists or Other Care Team Provider:     Education and Discussions with Family / Patient: Patient declined call to .     Total Time Spent on Date of Encounter in care of patient: 40 mins. This time was spent on one or more of the following: performing physical exam; counseling and coordination of care; obtaining or reviewing history; documenting in the medical record; reviewing/ordering tests, medications or procedures; communicating with other healthcare professionals and discussing with patient's family/caregivers.    Current Length of Stay: 12 day(s)  Current Patient Status: Inpatient   Certification Statement: The patient will continue to require additional inpatient hospital stay due to IV diuresis  Discharge Plan: Anticipate discharge in 48-72 hrs to home with home  services.    Code Status: Level 1 - Full Code    Subjective:   Patient feels somewhat better each day, shortness of breath improving on exertion.  Denies chest pain/palpitations, nausea/vomiting or abdominal pain.  Lower extremity swelling improving.    Objective:     Vitals:   Temp (24hrs), Av.6 °F (36.4 °C), Min:97.3 °F (36.3 °C), Max:98.1 °F (36.7 °C)    Temp:  [97.3 °F (36.3 °C)-98.1 °F (36.7 °C)] 97.4 °F (36.3 °C)  HR:  [105-119] 105  Resp:  [14-20] 18  BP: ()/(57-83) 123/83  SpO2:  [92 %-99 %] 99 %  Body mass index is 46.25 kg/m².     Input and Output Summary (last 24 hours):     Intake/Output Summary (Last 24 hours) at 2/3/2024 1216  Last data filed at 2/3/2024 1201  Gross per 24 hour   Intake 3810 ml   Output 6350 ml   Net -2540 ml       Physical Exam:   Physical Exam  Vitals and nursing note reviewed.   Constitutional:       Appearance: Normal appearance.      Interventions: Nasal cannula in place.      Comments: No acute distress   HENT:      Head: Normocephalic.   Eyes:      General: No scleral icterus.     Extraocular Movements: Extraocular movements intact.      Conjunctiva/sclera: Conjunctivae normal.   Cardiovascular:      Rate and Rhythm: Normal rate. Rhythm irregularly irregular.   Pulmonary:      Breath sounds: Decreased breath sounds present. No wheezing, rhonchi or rales.   Abdominal:      General: Bowel sounds are normal.      Palpations: Abdomen is soft.      Tenderness: There is no abdominal tenderness. There is no guarding or rebound.   Musculoskeletal:         General: No swelling, tenderness or deformity.      Cervical back: Normal range of motion.      Comments: Trace lower extremity edema bilaterally.  Able to move upper/lower extremities without difficulty   Skin:     General: Skin is warm and dry.   Neurological:      Mental Status: She is alert and oriented to person, place, and time.   Psychiatric:         Mood and Affect: Mood normal.         Speech: Speech normal.          Behavior: Behavior normal.          Additional Data:     Labs:  Results from last 7 days   Lab Units 01/31/24  0358   WBC Thousand/uL 11.78*   HEMOGLOBIN g/dL 10.3*   HEMATOCRIT % 36.6   PLATELETS Thousands/uL 318   NEUTROS PCT % 74   LYMPHS PCT % 17   MONOS PCT % 8   EOS PCT % 0     Results from last 7 days   Lab Units 02/03/24  0519   SODIUM mmol/L 134*   POTASSIUM mmol/L 2.7*   CHLORIDE mmol/L 80*   CO2 mmol/L 40*   BUN mg/dL 39*   CREATININE mg/dL 0.83   ANION GAP mmol/L 14   CALCIUM mg/dL 7.8*   GLUCOSE RANDOM mg/dL 208*         Results from last 7 days   Lab Units 02/03/24  1120 02/03/24  0743 02/02/24  2106 02/02/24  1600 02/02/24  1043 02/02/24  0714 02/01/24  1938 02/01/24  1604 02/01/24  1106 02/01/24  0738 01/31/24  2106 01/31/24  1557   POC GLUCOSE mg/dl 323* 239* 262* 267* 312* 197* 242* 258* 211* 173* 222* 194*               Lines/Drains:  Invasive Devices       Peripheral Intravenous Line  Duration             Peripheral IV 02/03/24 Left;Ventral (anterior) Forearm <1 day                      Telemetry:  Telemetry Orders (From admission, onward)               24 Hour Telemetry Monitoring  Continuous x 24 Hours (Telem)        Question:  Reason for 24 Hour Telemetry  Answer:  Decompensated CHF- and any one of the following: continuous diuretic infusion or total diuretic dose >200 mg daily, associated electrolyte derangement (I.e. K < 3.0), ionotropic drip (continuous infusion), hx of ventricular arrhythmia, or new EF < 35%                     Telemetry Reviewed: Atrial fibrillation. HR averaging 100  Indication for Continued Telemetry Use: Metabolic/electrolyte disturbance with high probability of dysrhythmia             Imaging: No pertinent imaging reviewed.    Recent Cultures (last 7 days):         Last 24 Hours Medication List:   Current Facility-Administered Medications   Medication Dose Route Frequency Provider Last Rate    acetaminophen  650 mg Oral Q6H PRN Rosamaria Miller PA-C      albuterol  2 puff  Inhalation Q4H PRN Hawa Marsh MD      apixaban  5 mg Oral BID Hawa Marsh MD      atorvastatin  40 mg Oral Daily With Dinner Hawa Marsh MD      budesonide  0.5 mg Nebulization Q12H Hawa Marsh MD      bumetanide (BUMEX) 12.5 mg infusion 50 mL  0.5 mg/hr Intravenous Continuous Rosamaria Miller PA-C 0.5 mg/hr (02/02/24 1730)    docusate sodium  100 mg Oral BID Hawa Marsh MD      Empagliflozin  10 mg Oral Daily Chuck Harrell PA-C      escitalopram  20 mg Oral Daily Hawa Marsh MD      insulin glargine  40 Units Subcutaneous HS Rupal Mercedes PA-C      insulin lispro  1-5 Units Subcutaneous TID AC Hawa Marsh MD      insulin lispro  1-5 Units Subcutaneous HS Hawa Marsh MD      insulin lispro  12 Units Subcutaneous TID With Meals Rupal Mercedes PA-C      loratadine  10 mg Oral Daily Hawa Marsh MD      metoprolol succinate  100 mg Oral Daily Hawa Marsh MD      montelukast  10 mg Oral HS Hawa Marsh MD      pantoprazole  40 mg Oral Daily Before Breakfast aHwa Marsh MD      potassium chloride  40 mEq Oral BID With Meals Rupal Mercedes PA-C      traZODone  150 mg Oral HS Hawa Marsh MD          Today, Patient Was Seen By: Rupal Mercedes PA-C    **Please Note: This note may have been constructed using a voice recognition system.**

## 2024-02-03 NOTE — ASSESSMENT & PLAN NOTE
Wt Readings from Last 3 Encounters:   02/03/24 118 kg (261 lb 1.6 oz)   11/09/23 129 kg (284 lb 12.8 oz)   09/20/23 128 kg (283 lb)     Appears volume overloaded  Worsening shortness of breath with bilateral lower extremity edema  Chest x-ray-pulmonary venous congestion  BNP-174, in obese patient  Echo 08/23-preserved EF, diastolic dysfunction, right ventricular systolic pressure 61  Home regimen-Bumex 2 mg twice daily  Continue with IV Bumex drip  Cr stable, good urine output and weight decreased  Metolazone added 2/1 x1 - per cardiology note 2/2 plan to add MWF on discharge with increased dose of oral bumex   Fluid restriction 1500 mL  Unfortunately patient has been noncompliant with both fluid and salt restriction despite extensive education  Salt restriction  Strict I's and O's  Appreciate Cardiology input

## 2024-02-03 NOTE — ASSESSMENT & PLAN NOTE
Potassium 2.7 in setting of IV diuresis  S/p oral potassium 40 mEq x1 this morning in addition to 20 mEq IV x2 doses  Will give additional dose 40 mEq this evening  Trend BMP  Monitor on telemetry  Check magnesium

## 2024-02-03 NOTE — ASSESSMENT & PLAN NOTE
Lab Results   Component Value Date    HGBA1C 8.5 (H) 01/22/2024       Recent Labs     02/02/24  1600 02/02/24  2106 02/03/24  0743 02/03/24  1120   POCGLU 267* 262* 239* 323*         Blood Sugar Average: Last 72 hrs:  (P) 230.1527290389518038  Currently uncontrolled, as per recent A1c  Home regimen consists of glargine 30 units at bedtime, insulin lispro 30 units with breakfast, 15 units with lunch and dinner, metformin 500 mg twice daily and Ozempic weekly  Continue with insulin glargine 30 units at bedtime  Increase to 40 units qhs on 2/3  Increased humalog to 12 units TID on 2/3  Additional coverage with sliding scale  Adjust regimen as needed  Carb consistent diet - unfortunately extremely noncompliant.  Per nursing staff frequently asking for various snacks throughout the day  Frequent Accu-Cheks and hypoglycemia protocol

## 2024-02-03 NOTE — ASSESSMENT & PLAN NOTE
Baseline oxygen requirement 4 L continuously  Underlying severe COPD, severe pulmonary hypertension and diastolic heart failure  BNP-174  COVID/respiratory panel-negative  Chest x-ray consistent with pulmonary venous congestion, final reading pending  IV Solu-Medrol discontinued  Continue with IV Bumex drip - Metolazone added 2/1  Continue with telemetry  Appreciate Cardiology and Pulmonology recommendations  Respiratory protocol  Monitor volume status  Strict I's and O's  Fluid restriction  Resolved - weaned to baseline of 4 L nasal cannula

## 2024-02-04 LAB
ANION GAP SERPL CALCULATED.3IONS-SCNC: 15 MMOL/L
BUN SERPL-MCNC: 42 MG/DL (ref 5–25)
CALCIUM SERPL-MCNC: 8.5 MG/DL (ref 8.4–10.2)
CHLORIDE SERPL-SCNC: 75 MMOL/L (ref 96–108)
CO2 SERPL-SCNC: 42 MMOL/L (ref 21–32)
CREAT SERPL-MCNC: 0.98 MG/DL (ref 0.6–1.3)
GFR SERPL CREATININE-BSD FRML MDRD: 60 ML/MIN/1.73SQ M
GLUCOSE SERPL-MCNC: 177 MG/DL (ref 65–140)
GLUCOSE SERPL-MCNC: 202 MG/DL (ref 65–140)
GLUCOSE SERPL-MCNC: 207 MG/DL (ref 65–140)
GLUCOSE SERPL-MCNC: 294 MG/DL (ref 65–140)
GLUCOSE SERPL-MCNC: 378 MG/DL (ref 65–140)
MAGNESIUM SERPL-MCNC: 3 MG/DL (ref 1.9–2.7)
POTASSIUM SERPL-SCNC: 3 MMOL/L (ref 3.5–5.3)
SODIUM SERPL-SCNC: 132 MMOL/L (ref 135–147)

## 2024-02-04 PROCEDURE — 94003 VENT MGMT INPAT SUBQ DAY: CPT

## 2024-02-04 PROCEDURE — 94760 N-INVAS EAR/PLS OXIMETRY 1: CPT

## 2024-02-04 PROCEDURE — 99232 SBSQ HOSP IP/OBS MODERATE 35: CPT | Performed by: PHYSICIAN ASSISTANT

## 2024-02-04 PROCEDURE — 83735 ASSAY OF MAGNESIUM: CPT | Performed by: INTERNAL MEDICINE

## 2024-02-04 PROCEDURE — 94660 CPAP INITIATION&MGMT: CPT

## 2024-02-04 PROCEDURE — 94640 AIRWAY INHALATION TREATMENT: CPT

## 2024-02-04 PROCEDURE — 80048 BASIC METABOLIC PNL TOTAL CA: CPT | Performed by: INTERNAL MEDICINE

## 2024-02-04 PROCEDURE — 82948 REAGENT STRIP/BLOOD GLUCOSE: CPT

## 2024-02-04 RX ORDER — POTASSIUM CHLORIDE 14.9 MG/ML
20 INJECTION INTRAVENOUS ONCE
Status: COMPLETED | OUTPATIENT
Start: 2024-02-04 | End: 2024-02-05

## 2024-02-04 RX ORDER — ACETAZOLAMIDE 250 MG/1
250 TABLET ORAL EVERY 12 HOURS SCHEDULED
Status: COMPLETED | OUTPATIENT
Start: 2024-02-04 | End: 2024-02-04

## 2024-02-04 RX ORDER — BUMETANIDE 1 MG/1
4 TABLET ORAL 2 TIMES DAILY
Status: DISCONTINUED | OUTPATIENT
Start: 2024-02-04 | End: 2024-02-05

## 2024-02-04 RX ADMIN — POTASSIUM CHLORIDE 20 MEQ: 14.9 INJECTION, SOLUTION INTRAVENOUS at 09:58

## 2024-02-04 RX ADMIN — INSULIN LISPRO 12 UNITS: 100 INJECTION, SOLUTION INTRAVENOUS; SUBCUTANEOUS at 12:08

## 2024-02-04 RX ADMIN — BUDESONIDE 0.5 MG: 0.5 INHALANT ORAL at 08:25

## 2024-02-04 RX ADMIN — INSULIN LISPRO 1 UNITS: 100 INJECTION, SOLUTION INTRAVENOUS; SUBCUTANEOUS at 08:41

## 2024-02-04 RX ADMIN — DOCUSATE SODIUM 100 MG: 100 CAPSULE, LIQUID FILLED ORAL at 09:09

## 2024-02-04 RX ADMIN — INSULIN LISPRO 12 UNITS: 100 INJECTION, SOLUTION INTRAVENOUS; SUBCUTANEOUS at 17:15

## 2024-02-04 RX ADMIN — INSULIN LISPRO 4 UNITS: 100 INJECTION, SOLUTION INTRAVENOUS; SUBCUTANEOUS at 17:15

## 2024-02-04 RX ADMIN — POTASSIUM CHLORIDE 40 MEQ: 1500 TABLET, EXTENDED RELEASE ORAL at 17:17

## 2024-02-04 RX ADMIN — ATORVASTATIN CALCIUM 40 MG: 40 TABLET, FILM COATED ORAL at 17:17

## 2024-02-04 RX ADMIN — ESCITALOPRAM OXALATE 20 MG: 20 TABLET ORAL at 09:09

## 2024-02-04 RX ADMIN — INSULIN LISPRO 2 UNITS: 100 INJECTION, SOLUTION INTRAVENOUS; SUBCUTANEOUS at 21:05

## 2024-02-04 RX ADMIN — APIXABAN 5 MG: 5 TABLET, FILM COATED ORAL at 17:52

## 2024-02-04 RX ADMIN — APIXABAN 5 MG: 5 TABLET, FILM COATED ORAL at 09:09

## 2024-02-04 RX ADMIN — INSULIN GLARGINE 40 UNITS: 100 INJECTION, SOLUTION SUBCUTANEOUS at 21:03

## 2024-02-04 RX ADMIN — LORATADINE 10 MG: 10 TABLET ORAL at 09:09

## 2024-02-04 RX ADMIN — TRAZODONE HYDROCHLORIDE 150 MG: 100 TABLET ORAL at 21:03

## 2024-02-04 RX ADMIN — EMPAGLIFLOZIN 10 MG: 10 TABLET, FILM COATED ORAL at 09:09

## 2024-02-04 RX ADMIN — ACETAZOLAMIDE 250 MG: 250 TABLET ORAL at 21:03

## 2024-02-04 RX ADMIN — BUMETANIDE 4 MG: 1 TABLET ORAL at 11:32

## 2024-02-04 RX ADMIN — POTASSIUM CHLORIDE 40 MEQ: 1500 TABLET, EXTENDED RELEASE ORAL at 09:09

## 2024-02-04 RX ADMIN — METOPROLOL SUCCINATE 100 MG: 50 TABLET, EXTENDED RELEASE ORAL at 09:09

## 2024-02-04 RX ADMIN — BUMETANIDE 4 MG: 1 TABLET ORAL at 17:58

## 2024-02-04 RX ADMIN — DOCUSATE SODIUM 100 MG: 100 CAPSULE, LIQUID FILLED ORAL at 17:52

## 2024-02-04 RX ADMIN — PANTOPRAZOLE SODIUM 40 MG: 40 TABLET, DELAYED RELEASE ORAL at 06:16

## 2024-02-04 RX ADMIN — ACETAMINOPHEN 325MG 650 MG: 325 TABLET ORAL at 10:58

## 2024-02-04 RX ADMIN — INSULIN LISPRO 12 UNITS: 100 INJECTION, SOLUTION INTRAVENOUS; SUBCUTANEOUS at 08:41

## 2024-02-04 RX ADMIN — INSULIN LISPRO 1 UNITS: 100 INJECTION, SOLUTION INTRAVENOUS; SUBCUTANEOUS at 12:07

## 2024-02-04 RX ADMIN — ACETAZOLAMIDE 250 MG: 250 TABLET ORAL at 11:32

## 2024-02-04 RX ADMIN — BUDESONIDE 0.5 MG: 0.5 INHALANT ORAL at 21:50

## 2024-02-04 RX ADMIN — MONTELUKAST 10 MG: 10 TABLET, FILM COATED ORAL at 21:03

## 2024-02-04 NOTE — ASSESSMENT & PLAN NOTE
Baseline oxygen requirement 4 L continuously  Underlying severe COPD, severe pulmonary hypertension and diastolic heart failure  BNP-174  COVID/respiratory panel-negative  Chest x-ray consistent with pulmonary venous congestion, final reading pending  IV Solu-Medrol discontinued  Transition to oral bumex 2/4 - Metolazone added 2/1 x1  Continue with telemetry  Appreciate Cardiology and Pulmonology recommendations  Respiratory protocol  Monitor volume status  Strict I's and O's  Fluid restriction  Resolved - weaned to baseline of 4 L nasal cannula

## 2024-02-04 NOTE — PLAN OF CARE
Problem: DISCHARGE PLANNING  Goal: Discharge to home or other facility with appropriate resources  Description: INTERVENTIONS:  - Identify barriers to discharge w/patient and caregiver  - Arrange for needed discharge resources and transportation as appropriate  - Identify discharge learning needs (meds, wound care, etc.)  - Arrange for interpretive services to assist at discharge as needed  - Refer to Case Management Department for coordinating discharge planning if the patient needs post-hospital services based on physician/advanced practitioner order or complex needs related to functional status, cognitive ability, or social support system  Outcome: Not Progressing

## 2024-02-04 NOTE — ASSESSMENT & PLAN NOTE
Potassium 3.0 in setting of aggressive IV diuresis  Currently ordered 40 mEq BID - will give additional 20 mEq IV x1 this AM.  Will be transitioned to oral bumex today  Trend BMP  Monitor on telemetry

## 2024-02-04 NOTE — ASSESSMENT & PLAN NOTE
Lab Results   Component Value Date    HGBA1C 8.5 (H) 01/22/2024       Recent Labs     02/03/24  1607 02/03/24  1825 02/03/24  2251 02/04/24  0714   POCGLU 323* 492* 208* 202*       Blood Sugar Average: Last 72 hrs:  (P) 264.4199099985183342  Currently uncontrolled, as per recent A1c  Home regimen consists of glargine 30 units at bedtime, insulin lispro 30 units with breakfast, 15 units with lunch and dinner, metformin 500 mg twice daily and Ozempic weekly  Continue with insulin glargine 30 units at bedtime  Increase to 40 units qhs on 2/3  Increased humalog to 12 units TID on 2/3  Additional coverage with sliding scale  Adjust regimen as needed  Carb consistent diet - unfortunately extremely noncompliant.  Per nursing staff frequently asking for various snacks throughout the day  Frequent Accu-Cheks and hypoglycemia protocol

## 2024-02-04 NOTE — ASSESSMENT & PLAN NOTE
Wt Readings from Last 3 Encounters:   02/04/24 119 kg (262 lb 12.8 oz)   11/09/23 129 kg (284 lb 12.8 oz)   09/20/23 128 kg (283 lb)     Appears volume overloaded  Worsening shortness of breath with bilateral lower extremity edema  Chest x-ray-pulmonary venous congestion  BNP-174, in obese patient  Echo 08/23-preserved EF, diastolic dysfunction, right ventricular systolic pressure 61  Home regimen-Bumex 2 mg twice daily  Has been on IV Bumex drip  Metolazone added 2/1 x1 - per cardiology note 2/2 plan to add MWF on discharge with increased dose of oral bumex   Fluid restriction 1500 mL  Unfortunately patient has been noncompliant with both fluid and salt restriction despite extensive education  Salt restriction  Strict I's and O's  Appreciate Cardiology input  Weight appears to have plateaued.  Na decreasing, BUN/Cr increased.  Discussed with cardiology 2/4, will stop bumex gtt and transition to oral bumex 4 mg BID in addition to diamox 250 mg BID x2 doses given rising CO2

## 2024-02-04 NOTE — PROGRESS NOTES
[facility]  Progress Note  Name: Mattie Joy I  MRN: [id number]  Unit/Bed#: -01 I Date of Admission: 1/22/2024   Date of Service: 2/4/2024 I Hospital Day: 13    Assessment/Plan   * Acute on chronic hypercapnic respiratory failure (HCC)  Assessment & Plan  Baseline oxygen requirement 4 L continuously  Underlying severe COPD, severe pulmonary hypertension and diastolic heart failure  BNP-174  COVID/respiratory panel-negative  Chest x-ray consistent with pulmonary venous congestion, final reading pending  IV Solu-Medrol discontinued  Transition to oral bumex 2/4 - Metolazone added 2/1 x1  Continue with telemetry  Appreciate Cardiology and Pulmonology recommendations  Respiratory protocol  Monitor volume status  Strict I's and O's  Fluid restriction  Resolved - weaned to baseline of 4 L nasal cannula    Acute on chronic heart failure with preserved ejection fraction (HCC)  Assessment & Plan  Wt Readings from Last 3 Encounters:   02/04/24 119 kg (262 lb 12.8 oz)   11/09/23 129 kg (284 lb 12.8 oz)   09/20/23 128 kg (283 lb)     Appears volume overloaded  Worsening shortness of breath with bilateral lower extremity edema  Chest x-ray-pulmonary venous congestion  BNP-174, in obese patient  Echo 08/23-preserved EF, diastolic dysfunction, right ventricular systolic pressure 61  Home regimen-Bumex 2 mg twice daily  Has been on IV Bumex drip  Metolazone added 2/1 x1 - per cardiology note 2/2 plan to add MWF on discharge with increased dose of oral bumex   Fluid restriction 1500 mL  Unfortunately patient has been noncompliant with both fluid and salt restriction despite extensive education  Salt restriction  Strict I's and O's  Appreciate Cardiology input  Weight appears to have plateaued.  Na decreasing, BUN/Cr increased.  Discussed with cardiology 2/4, will stop bumex gtt and transition to oral bumex 4 mg BID in addition to diamox 250 mg BID x2 doses given rising CO2    COPD, severe  ([facility])  Assessment & Plan  Home regimen consists of benralizumab, Singulair, Pulmicort nebulization twice daily, Xopenex and albuterol as needed, Bevespi twice daily  Was seen at pulmonology office on 1/22, had respiratory distress, was given prednisone taper and urged to go to ED for evaluation  Status post IV Solu-Medrol 125 mg in ED  IV Solu-Medrol 40mg discontinued  Respiratory protocol  Continue with Pulmicort nebulization twice daily  Albuterol and Xopenex as needed  Singulair 10 mg daily  Inpatient consult to Pulmonology -no further inpatient recommendations      Eosinophilic asthma  Assessment & Plan  On benralizumab every 8 weeks  Follows with Pulmonology    Morbid obesity ([facility])  Assessment & Plan  Counseling about adaptation of healthy dietary habits and lifestyle modifications, once medically stable  BMI >50 on admission - decreasing with diuresis   Affects all aspects of care    Paroxysmal atrial fibrillation ([facility])  Assessment & Plan  Rate control with Toprol- mg daily  Anticoagulated with Eliquis 5 mg twice daily    Hypokalemia  Assessment & Plan  Potassium 3.0 in setting of aggressive IV diuresis  Currently ordered 40 mEq BID - will give additional 20 mEq IV x1 this AM.  Will be transitioned to oral bumex today  Trend BMP  Monitor on telemetry    Type 2 diabetes mellitus with stage 2 chronic kidney disease, with long-term current use of insulin ([facility])  Assessment & Plan  Lab Results   Component Value Date    HGBA1C 8.5 (H) 01/22/2024       Recent Labs     02/03/24  1607 02/03/24  1825 02/03/24  2251 02/04/24  0714   POCGLU 323* 492* 208* 202*       Blood Sugar Average: Last 72 hrs:  (P) 264.4091373675117306  Currently uncontrolled, as per recent A1c  Home regimen consists of glargine 30 units at bedtime, insulin lispro 30 units with breakfast, 15 units with lunch and dinner, metformin 500 mg twice daily and Ozempic weekly  Continue with insulin glargine 30 units at bedtime  Increase to 40 units qhs  on 2/3  Increased humalog to 12 units TID on 2/3  Additional coverage with sliding scale  Adjust regimen as needed  Carb consistent diet - unfortunately extremely noncompliant.  Per nursing staff frequently asking for various snacks throughout the day  Frequent Accu-Cheks and hypoglycemia protocol    Hypercholesterolemia  Assessment & Plan  Continue with atorvastatin 40 mg daily at bedtime    Obstructive sleep apnea  Assessment & Plan  Uses CPAP at home  Follows with Pulmonology on outpatient, awaiting BiPAP study with transcutaneous oxygen monitoring  Continue with BiPAP while inpatient    Esophageal reflux  Assessment & Plan  Continue with Protonix           VTE Pharmacologic Prophylaxis: VTE Score: 8 High Risk (Score >/= 5) - Pharmacological DVT Prophylaxis Ordered: apixaban (Eliquis). Sequential Compression Devices Ordered.    Mobility:   Basic Mobility Inpatient Raw Score: 20  JH-HLM Goal: 6: Walk 10 steps or more  JH-HLM Achieved: 6: Walk 10 steps or more  HLM Goal achieved. Continue to encourage appropriate mobility.    Patient Centered Rounds: I performed bedside rounds with nursing staff today.   Discussions with Specialists or Other Care Team Provider: Cardiology    Education and Discussions with Family / Patient: Patient declined call to .     Total Time Spent on Date of Encounter in care of patient: 45 mins. This time was spent on one or more of the following: performing physical exam; counseling and coordination of care; obtaining or reviewing history; documenting in the medical record; reviewing/ordering tests, medications or procedures; communicating with other healthcare professionals and discussing with patient's family/caregivers.    Current Length of Stay: 13 day(s)  Current Patient Status: Inpatient   Certification Statement: The patient will continue to require additional inpatient hospital stay due to transition to oral diuretic  Discharge Plan: Anticipate discharge in 24-48 hrs  to home with home services.    Code Status: Level 1 - Full Code    Subjective:   Patient overall feels well.  Breathing feels close to baseline.  Denies chest pain/palpitations, shortness of breath, nausea/vomiting, abdominal pain.    Objective:     Vitals:   Temp (24hrs), Av.6 °F (36.4 °C), Min:97.2 °F (36.2 °C), Max:98.7 °F (37.1 °C)    Temp:  [97.2 °F (36.2 °C)-98.7 °F (37.1 °C)] 97.2 °F (36.2 °C)  HR:  [] 98  Resp:  [15-20] 18  BP: ()/(49-74) 108/57  SpO2:  [94 %-99 %] 99 %  Body mass index is 46.55 kg/m².     Input and Output Summary (last 24 hours):     Intake/Output Summary (Last 24 hours) at 2024 1122  Last data filed at 2024 1058  Gross per 24 hour   Intake 5593 ml   Output 8000 ml   Net -2407 ml       Physical Exam:   Physical Exam  Vitals and nursing note reviewed.   Constitutional:       Appearance: Normal appearance.      Interventions: Nasal cannula in place.      Comments: No acute distress   HENT:      Head: Normocephalic.   Eyes:      General: No scleral icterus.     Extraocular Movements: Extraocular movements intact.      Conjunctiva/sclera: Conjunctivae normal.   Cardiovascular:      Rate and Rhythm: Normal rate. Rhythm irregularly irregular.   Pulmonary:      Breath sounds: Decreased breath sounds present. No wheezing, rhonchi or rales.   Abdominal:      General: Bowel sounds are normal.      Palpations: Abdomen is soft.      Tenderness: There is no abdominal tenderness. There is no guarding or rebound.   Musculoskeletal:         General: No swelling, tenderness or deformity.      Cervical back: Normal range of motion.      Comments: Able to move upper/lower extremities bilaterally, no edema   Skin:     General: Skin is warm and dry.   Neurological:      Mental Status: She is alert and oriented to person, place, and time.   Psychiatric:         Mood and Affect: Mood normal.         Speech: Speech normal.         Behavior: Behavior normal.          Additional Data:      Labs:  Results from last 7 days   Lab Units 01/31/24  0358   WBC Thousand/uL 11.78*   HEMOGLOBIN g/dL 10.3*   HEMATOCRIT % 36.6   PLATELETS Thousands/uL 318   NEUTROS PCT % 74   LYMPHS PCT % 17   MONOS PCT % 8   EOS PCT % 0     Results from last 7 days   Lab Units 02/04/24  0409   SODIUM mmol/L 132*   POTASSIUM mmol/L 3.0*   CHLORIDE mmol/L 75*   CO2 mmol/L 42*   BUN mg/dL 42*   CREATININE mg/dL 0.98   ANION GAP mmol/L 15   CALCIUM mg/dL 8.5   GLUCOSE RANDOM mg/dL 177*         Results from last 7 days   Lab Units 02/04/24  0714 02/03/24  2251 02/03/24  1825 02/03/24  1607 02/03/24  1120 02/03/24  0743 02/02/24  2106 02/02/24  1600 02/02/24  1043 02/02/24  0714 02/01/24  1938 02/01/24  1604   POC GLUCOSE mg/dl 202* 208* 492* 323* 323* 239* 262* 267* 312* 197* 242* 258*               Lines/Drains:  Invasive Devices       Peripheral Intravenous Line  Duration             Peripheral IV 02/03/24 Left;Ventral (anterior) Forearm 1 day                      Telemetry:  Telemetry Orders (From admission, onward)               24 Hour Telemetry Monitoring  Continuous x 24 Hours (Telem)        Question:  Reason for 24 Hour Telemetry  Answer:  Decompensated CHF- and any one of the following: continuous diuretic infusion or total diuretic dose >200 mg daily, associated electrolyte derangement (I.e. K < 3.0), ionotropic drip (continuous infusion), hx of ventricular arrhythmia, or new EF < 35%                     Telemetry Reviewed: Atrial fibrillation. HR averaging 90  Indication for Continued Telemetry Use: Metabolic/electrolyte disturbance with high probability of dysrhythmia             Imaging: No pertinent imaging reviewed.    Recent Cultures (last 7 days):         Last 24 Hours Medication List:   Current Facility-Administered Medications   Medication Dose Route Frequency Provider Last Rate    acetaminophen  650 mg Oral Q6H PRN Roasmaria Miller PA-C      acetaZOLAMIDE  250 mg Oral Q12H LISETTE Mercedes PA-C      albuterol   2 puff Inhalation Q4H PRN Hawa Marsh MD      apixaban  5 mg Oral BID Hawa Marsh MD      atorvastatin  40 mg Oral Daily With Dinner Hawa Marsh MD      budesonide  0.5 mg Nebulization Q12H Hawa Marsh MD      bumetanide  4 mg Oral BID Rupal Mercedes PA-C      docusate sodium  100 mg Oral BID Hawa Marsh MD      Empagliflozin  10 mg Oral Daily Chuck Harrell PA-C      escitalopram  20 mg Oral Daily Hawa Marsh MD      insulin glargine  40 Units Subcutaneous HS Rupal Mercedes PA-C      insulin lispro  1-5 Units Subcutaneous TID AC Hawa Marsh MD      insulin lispro  1-5 Units Subcutaneous HS Hawa Marsh MD      insulin lispro  12 Units Subcutaneous TID With Meals Rupal Mercedes PA-C      loratadine  10 mg Oral Daily Hawa Marsh MD      metoprolol succinate  100 mg Oral Daily Hawa Marsh MD      montelukast  10 mg Oral HS Hawa Marsh MD      ondansetron  4 mg Intravenous Q4H PRN Francois Cadet PA-C      pantoprazole  40 mg Oral Daily Before Breakfast Hawa Marsh MD      potassium chloride  40 mEq Oral BID With Meals Rupal Mercedes PA-C      potassium chloride  20 mEq Intravenous Once Rupal Mercedes PA-C 20 mEq (02/04/24 0958)    traZODone  150 mg Oral HS Hawa Marsh MD          Today, Patient Was Seen By: Rupal Mercedes PA-C    **Please Note: This note may have been constructed using a voice recognition system.**

## 2024-02-05 LAB
ANION GAP SERPL CALCULATED.3IONS-SCNC: 10 MMOL/L
BUN SERPL-MCNC: 47 MG/DL (ref 5–25)
CALCIUM SERPL-MCNC: 8.6 MG/DL (ref 8.4–10.2)
CHLORIDE SERPL-SCNC: 78 MMOL/L (ref 96–108)
CO2 SERPL-SCNC: 44 MMOL/L (ref 21–32)
CREAT SERPL-MCNC: 0.87 MG/DL (ref 0.6–1.3)
GFR SERPL CREATININE-BSD FRML MDRD: 70 ML/MIN/1.73SQ M
GLUCOSE SERPL-MCNC: 173 MG/DL (ref 65–140)
GLUCOSE SERPL-MCNC: 246 MG/DL (ref 65–140)
GLUCOSE SERPL-MCNC: 258 MG/DL (ref 65–140)
GLUCOSE SERPL-MCNC: 261 MG/DL (ref 65–140)
GLUCOSE SERPL-MCNC: 314 MG/DL (ref 65–140)
POTASSIUM SERPL-SCNC: 2.5 MMOL/L (ref 3.5–5.3)
POTASSIUM SERPL-SCNC: 3.7 MMOL/L (ref 3.5–5.3)
SODIUM SERPL-SCNC: 132 MMOL/L (ref 135–147)

## 2024-02-05 PROCEDURE — 99232 SBSQ HOSP IP/OBS MODERATE 35: CPT | Performed by: PHYSICIAN ASSISTANT

## 2024-02-05 PROCEDURE — 82948 REAGENT STRIP/BLOOD GLUCOSE: CPT

## 2024-02-05 PROCEDURE — 84132 ASSAY OF SERUM POTASSIUM: CPT | Performed by: PHYSICIAN ASSISTANT

## 2024-02-05 PROCEDURE — 94660 CPAP INITIATION&MGMT: CPT

## 2024-02-05 PROCEDURE — 94640 AIRWAY INHALATION TREATMENT: CPT

## 2024-02-05 PROCEDURE — 99233 SBSQ HOSP IP/OBS HIGH 50: CPT | Performed by: INTERNAL MEDICINE

## 2024-02-05 PROCEDURE — 94760 N-INVAS EAR/PLS OXIMETRY 1: CPT

## 2024-02-05 PROCEDURE — 80048 BASIC METABOLIC PNL TOTAL CA: CPT | Performed by: INTERNAL MEDICINE

## 2024-02-05 RX ORDER — INSULIN LISPRO 100 [IU]/ML
15 INJECTION, SOLUTION INTRAVENOUS; SUBCUTANEOUS
Status: DISCONTINUED | OUTPATIENT
Start: 2024-02-05 | End: 2024-02-06

## 2024-02-05 RX ORDER — POTASSIUM CHLORIDE 14.9 MG/ML
20 INJECTION INTRAVENOUS ONCE
Status: COMPLETED | OUTPATIENT
Start: 2024-02-05 | End: 2024-02-05

## 2024-02-05 RX ORDER — POTASSIUM CHLORIDE 20 MEQ/1
40 TABLET, EXTENDED RELEASE ORAL 3 TIMES WEEKLY
Status: DISCONTINUED | OUTPATIENT
Start: 2024-02-07 | End: 2024-02-06

## 2024-02-05 RX ORDER — INSULIN LISPRO 100 [IU]/ML
15 INJECTION, SOLUTION INTRAVENOUS; SUBCUTANEOUS
Status: DISCONTINUED | OUTPATIENT
Start: 2024-02-05 | End: 2024-02-05

## 2024-02-05 RX ORDER — BUMETANIDE 1 MG/1
4 TABLET ORAL 2 TIMES DAILY
Status: DISCONTINUED | OUTPATIENT
Start: 2024-02-05 | End: 2024-02-06

## 2024-02-05 RX ORDER — METOLAZONE 2.5 MG/1
2.5 TABLET ORAL 3 TIMES WEEKLY
Status: DISCONTINUED | OUTPATIENT
Start: 2024-02-07 | End: 2024-02-06

## 2024-02-05 RX ORDER — POTASSIUM CHLORIDE 20 MEQ/1
40 TABLET, EXTENDED RELEASE ORAL
Status: DISCONTINUED | OUTPATIENT
Start: 2024-02-05 | End: 2024-02-07

## 2024-02-05 RX ORDER — INSULIN GLARGINE 100 [IU]/ML
45 INJECTION, SOLUTION SUBCUTANEOUS
Status: DISCONTINUED | OUTPATIENT
Start: 2024-02-05 | End: 2024-02-06

## 2024-02-05 RX ADMIN — PANTOPRAZOLE SODIUM 40 MG: 40 TABLET, DELAYED RELEASE ORAL at 06:44

## 2024-02-05 RX ADMIN — BUMETANIDE 4 MG: 1 TABLET ORAL at 17:09

## 2024-02-05 RX ADMIN — INSULIN LISPRO 2 UNITS: 100 INJECTION, SOLUTION INTRAVENOUS; SUBCUTANEOUS at 21:46

## 2024-02-05 RX ADMIN — POTASSIUM CHLORIDE 40 MEQ: 1500 TABLET, EXTENDED RELEASE ORAL at 16:06

## 2024-02-05 RX ADMIN — BUDESONIDE 0.5 MG: 0.5 INHALANT ORAL at 20:38

## 2024-02-05 RX ADMIN — LORATADINE 10 MG: 10 TABLET ORAL at 10:23

## 2024-02-05 RX ADMIN — ATORVASTATIN CALCIUM 40 MG: 40 TABLET, FILM COATED ORAL at 16:06

## 2024-02-05 RX ADMIN — INSULIN LISPRO 2 UNITS: 100 INJECTION, SOLUTION INTRAVENOUS; SUBCUTANEOUS at 10:17

## 2024-02-05 RX ADMIN — INSULIN LISPRO 15 UNITS: 100 INJECTION, SOLUTION INTRAVENOUS; SUBCUTANEOUS at 16:09

## 2024-02-05 RX ADMIN — INSULIN LISPRO 15 UNITS: 100 INJECTION, SOLUTION INTRAVENOUS; SUBCUTANEOUS at 10:17

## 2024-02-05 RX ADMIN — POTASSIUM CHLORIDE 20 MEQ: 14.9 INJECTION, SOLUTION INTRAVENOUS at 06:44

## 2024-02-05 RX ADMIN — ESCITALOPRAM OXALATE 20 MG: 20 TABLET ORAL at 10:23

## 2024-02-05 RX ADMIN — INSULIN LISPRO 15 UNITS: 100 INJECTION, SOLUTION INTRAVENOUS; SUBCUTANEOUS at 12:47

## 2024-02-05 RX ADMIN — BUMETANIDE 4 MG: 1 TABLET ORAL at 10:23

## 2024-02-05 RX ADMIN — INSULIN LISPRO 1 UNITS: 100 INJECTION, SOLUTION INTRAVENOUS; SUBCUTANEOUS at 16:09

## 2024-02-05 RX ADMIN — MONTELUKAST 10 MG: 10 TABLET, FILM COATED ORAL at 21:44

## 2024-02-05 RX ADMIN — BUDESONIDE 0.5 MG: 0.5 INHALANT ORAL at 08:57

## 2024-02-05 RX ADMIN — INSULIN GLARGINE 45 UNITS: 100 INJECTION, SOLUTION SUBCUTANEOUS at 21:45

## 2024-02-05 RX ADMIN — EMPAGLIFLOZIN 10 MG: 10 TABLET, FILM COATED ORAL at 10:23

## 2024-02-05 RX ADMIN — APIXABAN 5 MG: 5 TABLET, FILM COATED ORAL at 17:09

## 2024-02-05 RX ADMIN — DOCUSATE SODIUM 100 MG: 100 CAPSULE, LIQUID FILLED ORAL at 10:23

## 2024-02-05 RX ADMIN — APIXABAN 5 MG: 5 TABLET, FILM COATED ORAL at 10:23

## 2024-02-05 RX ADMIN — DOCUSATE SODIUM 100 MG: 100 CAPSULE, LIQUID FILLED ORAL at 17:09

## 2024-02-05 RX ADMIN — POTASSIUM CHLORIDE 40 MEQ: 1500 TABLET, EXTENDED RELEASE ORAL at 06:58

## 2024-02-05 RX ADMIN — INSULIN LISPRO 3 UNITS: 100 INJECTION, SOLUTION INTRAVENOUS; SUBCUTANEOUS at 12:46

## 2024-02-05 RX ADMIN — TRAZODONE HYDROCHLORIDE 150 MG: 100 TABLET ORAL at 21:44

## 2024-02-05 RX ADMIN — POTASSIUM CHLORIDE 40 MEQ: 1500 TABLET, EXTENDED RELEASE ORAL at 12:44

## 2024-02-05 NOTE — ASSESSMENT & PLAN NOTE
Potassium 2.7 in setting of recent aggressive IV diuresis now on oral bumex  Currently ordered 40 mEq BID - to receive additional 20 mEq IV this AM  Will increase to TID on 2/5  Repeat potassium later today  Trend BMP  Monitor on telemetry

## 2024-02-05 NOTE — PROGRESS NOTES
Pastoral Care Progress Note    2024  Patient: Mattie Joy : 1960  Admission Date & Time: 2024 1515  MRN: [id number] CSN: [id number]        Did brief  follow up visit.  Pt said she was fine and was eating lunch, so I did not disturb further.

## 2024-02-05 NOTE — PROGRESS NOTES
Formerly [facility]  Progress Note  Name: Mattie Joy I  MRN: [id number]  Unit/Bed#: -01 I Date of Admission: 1/22/2024   Date of Service: 2/5/2024 I Hospital Day: 14    Assessment/Plan   * Acute on chronic hypercapnic respiratory failure (HCC)  Assessment & Plan  Baseline oxygen requirement 4 L continuously  Underlying severe COPD, severe pulmonary hypertension and diastolic heart failure  BNP-174  COVID/respiratory panel-negative  Chest x-ray consistent with pulmonary venous congestion, final reading pending  IV Solu-Medrol discontinued  Transition to oral bumex 2/4 - Metolazone added 2/1 x1  Continue with telemetry  Appreciate Cardiology and Pulmonology recommendations  Respiratory protocol  Monitor volume status  Strict I's and O's  Fluid restriction  Resolved - weaned to baseline of 4 L nasal cannula    Acute on chronic heart failure with preserved ejection fraction (HCC)  Assessment & Plan  Wt Readings from Last 3 Encounters:   02/05/24 119 kg (263 lb 3.2 oz)   11/09/23 129 kg (284 lb 12.8 oz)   09/20/23 128 kg (283 lb)     Appears volume overloaded  Worsening shortness of breath with bilateral lower extremity edema  Chest x-ray-pulmonary venous congestion  BNP-174, in obese patient  Echo 08/23-preserved EF, diastolic dysfunction, right ventricular systolic pressure 61  Home regimen-Bumex 2 mg twice daily  Has been on IV Bumex drip  Metolazone added 2/1 x1 - per cardiology note 2/2 plan to add MWF on discharge with increased dose of oral bumex   Fluid restriction 1500 mL  Unfortunately patient has been noncompliant with both fluid and salt restriction despite extensive education  Salt restriction  Strict I's and O's  Appreciate Cardiology input  Weight appears to have plateaued.  Na decreasing, BUN/Cr increased.  Discussed with cardiology 2/4, will stop bumex gtt and transition to oral bumex 4 mg BID in addition to diamox 250 mg BID x2 doses given rising CO2  Low-normal BP this AM,  TT sent to cardiology regarding follow up and further recommendations    COPD, severe (HCC)  Assessment & Plan  Home regimen consists of benralizumab, Singulair, Pulmicort nebulization twice daily, Xopenex and albuterol as needed, Bevespi twice daily  Was seen at pulmonology office on 1/22, had respiratory distress, was given prednisone taper and urged to go to ED for evaluation  Status post IV Solu-Medrol 125 mg in ED  IV Solu-Medrol 40mg discontinued  Respiratory protocol  Continue with Pulmicort nebulization twice daily  Albuterol and Xopenex as needed  Singulair 10 mg daily  Inpatient consult to Pulmonology -no further inpatient recommendations      Eosinophilic asthma  Assessment & Plan  On benralizumab every 8 weeks  Follows with Pulmonology    Morbid obesity (HCC)  Assessment & Plan  Counseling about adaptation of healthy dietary habits and lifestyle modifications, once medically stable  BMI >50 on admission - decreasing with diuresis   Affects all aspects of care    Paroxysmal atrial fibrillation (HCC)  Assessment & Plan  Rate control with Toprol- mg daily  Anticoagulated with Eliquis 5 mg twice daily    Hypokalemia  Assessment & Plan  Potassium 2.7 in setting of recent aggressive IV diuresis now on oral bumex  Currently ordered 40 mEq BID - to receive additional 20 mEq IV this AM  Will increase to TID on 2/5  Repeat potassium later today  Trend BMP  Monitor on telemetry    Type 2 diabetes mellitus with stage 2 chronic kidney disease, with long-term current use of insulin ([facility])  Assessment & Plan  Lab Results   Component Value Date    HGBA1C 8.5 (H) 01/22/2024       Recent Labs     02/04/24  1634 02/04/24  2057 02/05/24  0721 02/05/24  1203   POCGLU 378* 294* 258* 314*         Blood Sugar Average: Last 72 hrs:  (P) [id number]  Currently uncontrolled, as per recent A1c  Home regimen consists of glargine 30 units at bedtime, insulin lispro 30 units with breakfast, 15 units with lunch and  dinner, metformin 500 mg twice daily and Ozempic weekly  Continue with insulin glargine 30 units at bedtime  Increase to 45 units qhs on 2/5  Increased humalog to 15 units TID on 2/5  Additional coverage with sliding scale  Adjust regimen as needed  Carb consistent diet - unfortunately extremely noncompliant.  Per nursing staff frequently asking for various snacks throughout the day  Frequent Accu-Cheks and hypoglycemia protocol    Hypercholesterolemia  Assessment & Plan  Continue with atorvastatin 40 mg daily at bedtime    Obstructive sleep apnea  Assessment & Plan  Uses CPAP at home  Follows with Pulmonology on outpatient, awaiting BiPAP study with transcutaneous oxygen monitoring  Continue with BiPAP while inpatient    Esophageal reflux  Assessment & Plan  Continue with Protonix         VTE Pharmacologic Prophylaxis: VTE Score: 8 High Risk (Score >/= 5) - Pharmacological DVT Prophylaxis Ordered: apixaban (Eliquis). Sequential Compression Devices Ordered.    Mobility:   Basic Mobility Inpatient Raw Score: 22  JH-HLM Goal: 7: Walk 25 feet or more  JH-HLM Achieved: 7: Walk 25 feet or more  HLM Goal achieved. Continue to encourage appropriate mobility.    Patient Centered Rounds: I performed bedside rounds with nursing staff today.   Discussions with Specialists or Other Care Team Provider: CM, cardiology AP    Education and Discussions with Family / Patient: Patient declined call to .     Total Time Spent on Date of Encounter in care of patient: 45 mins. This time was spent on one or more of the following: performing physical exam; counseling and coordination of care; obtaining or reviewing history; documenting in the medical record; reviewing/ordering tests, medications or procedures; communicating with other healthcare professionals and discussing with patient's family/caregivers.    Current Length of Stay: 14 day(s)  Current Patient Status: Inpatient   Certification Statement: The patient will  continue to require additional inpatient hospital stay due to monitoring on oral diuretics  Discharge Plan: Anticipate discharge in 48-72 hrs to home with home services.    Code Status: Level 1 - Full Code    Subjective:   Patient states her breathing is stable.  Denies chest pain/palpitations, shortness of breath, nausea/vomiting, abdominal pain, constipation or diarrhea.    Objective:     Vitals:   Temp (24hrs), Av.6 °F (36.4 °C), Min:96.4 °F (35.8 °C), Max:99.5 °F (37.5 °C)    Temp:  [96.4 °F (35.8 °C)-99.5 °F (37.5 °C)] 96.4 °F (35.8 °C)  HR:  [] 101  Resp:  [20] 20  BP: ()/(60-68) 95/63  SpO2:  [94 %-100 %] 95 %  Body mass index is 46.62 kg/m².     Input and Output Summary (last 24 hours):     Intake/Output Summary (Last 24 hours) at 2024 1218  Last data filed at 2024 1100  Gross per 24 hour   Intake 4889 ml   Output 5650 ml   Net -761 ml       Physical Exam:   Physical Exam  Vitals and nursing note reviewed.   Constitutional:       Appearance: Normal appearance.      Interventions: Nasal cannula in place.      Comments: No acute distress   HENT:      Head: Normocephalic.   Eyes:      General: No scleral icterus.     Extraocular Movements: Extraocular movements intact.      Conjunctiva/sclera: Conjunctivae normal.   Cardiovascular:      Rate and Rhythm: Normal rate. Rhythm irregularly irregular.   Pulmonary:      Breath sounds: Decreased breath sounds present. No wheezing, rhonchi or rales.   Abdominal:      General: Bowel sounds are normal.      Palpations: Abdomen is soft.      Tenderness: There is no abdominal tenderness. There is no guarding or rebound.   Musculoskeletal:         General: No swelling, tenderness or deformity.      Cervical back: Normal range of motion.      Comments: Able to move upper/lower ext bilaterally, no edema   Skin:     General: Skin is warm and dry.   Neurological:      Mental Status: She is alert and oriented to person, place, and time.   Psychiatric:          Mood and Affect: Mood normal.         Speech: Speech normal.         Behavior: Behavior normal.          Additional Data:     Labs:  Results from last 7 days   Lab Units 01/31/24  0358   WBC Thousand/uL 11.78*   HEMOGLOBIN g/dL 10.3*   HEMATOCRIT % 36.6   PLATELETS Thousands/uL 318   NEUTROS PCT % 74   LYMPHS PCT % 17   MONOS PCT % 8   EOS PCT % 0     Results from last 7 days   Lab Units 02/05/24  0454   SODIUM mmol/L 132*   POTASSIUM mmol/L 2.5*   CHLORIDE mmol/L 78*   CO2 mmol/L 44*   BUN mg/dL 47*   CREATININE mg/dL 0.87   ANION GAP mmol/L 10   CALCIUM mg/dL 8.6   GLUCOSE RANDOM mg/dL 261*         Results from last 7 days   Lab Units 02/05/24  1203 02/05/24  0721 02/04/24  2057 02/04/24  1634 02/04/24  1123 02/04/24  0714 02/03/24  2251 02/03/24  1825 02/03/24  1607 02/03/24  1120 02/03/24  0743 02/02/24  2106   POC GLUCOSE mg/dl 314* 258* 294* 378* 207* 202* 208* 492* 323* 323* 239* 262*               Lines/Drains:  Invasive Devices       Peripheral Intravenous Line  Duration             Peripheral IV 02/04/24 Right;Ventral (anterior) Forearm <1 day                      Telemetry:  Telemetry Orders (From admission, onward)               24 Hour Telemetry Monitoring  Continuous x 24 Hours (Telem)        Question:  Reason for 24 Hour Telemetry  Answer:  Decompensated CHF- and any one of the following: continuous diuretic infusion or total diuretic dose >200 mg daily, associated electrolyte derangement (I.e. K < 3.0), ionotropic drip (continuous infusion), hx of ventricular arrhythmia, or new EF < 35%                     Telemetry Reviewed: Atrial fibrillation. HR averaging 90  Indication for Continued Telemetry Use: Metabolic/electrolyte disturbance with high probability of dysrhythmia             Imaging: No pertinent imaging reviewed.    Recent Cultures (last 7 days):         Last 24 Hours Medication List:   Current Facility-Administered Medications   Medication Dose Route Frequency Provider Last Rate     acetaminophen  650 mg Oral Q6H PRN Rosamaria Miller PA-C      albuterol  2 puff Inhalation Q4H PRN Hawa Marsh MD      apixaban  5 mg Oral BID Hawa Marsh MD      atorvastatin  40 mg Oral Daily With Dinner Hawa Marsh MD      budesonide  0.5 mg Nebulization Q12H Hawa Marsh MD      bumetanide  4 mg Oral BID Rupal Mercedes PA-C      docusate sodium  100 mg Oral BID Hawa Marsh MD      Empagliflozin  10 mg Oral Daily Chuck Harrell PA-C      escitalopram  20 mg Oral Daily Hawa Marsh MD      insulin glargine  45 Units Subcutaneous HS Rupal Mercedes PA-C      insulin lispro  1-5 Units Subcutaneous TID AC Hawa Marsh MD      insulin lispro  1-5 Units Subcutaneous HS Hawa Marsh MD      insulin lispro  15 Units Subcutaneous TID With Meals Rupal Mercedes PA-C      loratadine  10 mg Oral Daily Hawa Marsh MD      [START ON 2/7/2024] metolazone  2.5 mg Oral Once per day on Monday Wednesday Friday Fran John PA-C      And    [START ON 2/7/2024] potassium chloride  40 mEq Oral Once per day on Monday Wednesday Friday Fran John PA-C      metoprolol succinate  100 mg Oral Daily Hawa Marsh MD      montelukast  10 mg Oral HS Hawa Marsh MD      ondansetron  4 mg Intravenous Q4H PRN Francois Cadet PA-C      pantoprazole  40 mg Oral Daily Before Breakfast Hawa Marsh MD      potassium chloride  40 mEq Oral TID With Meals Rosamaria Miller PA-C      traZODone  150 mg Oral HS Hawa Marsh MD          Today, Patient Was Seen By: Rupal Mercedes PA-C    **Please Note: This note may have been constructed using a voice recognition system.**

## 2024-02-05 NOTE — PROGRESS NOTES
"Cardiology Progress Note   Mattie Joy 63 y.o. female MRN: [id number]    Unit/Bed#: -01 Encounter: [id number]      Assessment:  Acute on chronic respiratory failure  Baseline oxygen requirements 4 L nasal cannula  Initially on admission patient noted to be hypoxic with saturations in the upper 80s while on 5 L nasal cannula, she has at maximum this admission thus far required 9-10 L but was able to wean down as of 1/23/2024  Etiology multifactorial in light of congestive heart failure as well as COPD exacerbation  Acute on chronic diastolic heart failure  8/15/2023 echo: LVEF 65%, grade 2 diastolic dysfunction, severely elevated RV systolic pressure 61 mmHg  Prehospital diuretic: Bumex 4 twice daily with 40 potassium daily  Prior weight as low as 278 in August 2023 at which time she was reportedly euvolemic  Paroxysmal atrial fibrillation  Patient in atrial fibrillation on arrival with rates in the low 100s  Thromboembolic PPx: Eliquis 5 twice daily  Rate control: Toprol- daily  COPD with acute exacerbation  Type 2 diabetes  Obesity, BMI 51  Pulmonary hypertension likely group 2/3 from left heart disease, underlying lung disease, OHV  Acute kidney injury, resolved    Plan:  BP low today but able to receive PO Bumex 4mg  BMP shows low K 2.7. Replete today for goal >4. Repeat tomorrow.   Continue Bumex 4mg BID as long as BP is stable; hold for SBP <100  Plan to add metolazone 2.5mg 3x weekly (MWF) starting 2/7. Extra Kdur 40meq ordered on metolazone days.   She will need to be monitored for at least 24-48 hours on this regimen to track BMP and ensure she is net neutral to slightly net negative on I&O charting.   Continue on telemetry while diuresing. Continue Toprol XL 100mg QD. Hold for SBP <110.    Subjective:   Patient seen and examined. No complaints or concerns.     Objective:     Vitals: Blood pressure 95/63, pulse 101, temperature (!) 96.4 °F (35.8 °C), resp. rate 20, height 5' 3\" (1.6 m), weight " 119 kg (263 lb 3.2 oz), SpO2 95%, not currently breastfeeding., Body mass index is 46.62 kg/m².,   Orthostatic Blood Pressures      Flowsheet Row Most Recent Value   Blood Pressure 95/63 filed at 02/05/2024 0936   Patient Position - Orthostatic VS Lying filed at 02/04/2024 1456              Intake/Output Summary (Last 24 hours) at 2/5/2024 1217  Last data filed at 2/5/2024 1100  Gross per 24 hour   Intake 4889 ml   Output 5650 ml   Net -761 ml         Physical Exam:    GEN: Mattie Joy appears well, alert and oriented x 3, pleasant and cooperative   HEENT: Sclera anicteric, conjunctivae pink, mucous membranes moist. Oropharynx clear.   NECK: supple, no significant JVD, Trachea midline, no thyromegaly.   HEART: regular rhythm, normal S1 and S2, no murmurs, clicks, gallops or rubs   LUNGS: clear to auscultation bilaterally; no wheezes, rales, or rhonchi. No increased work of breathing or signs of respiratory distress.   ABDOMEN: Soft, nontender, nondistended  EXTREMITIES: Skin warm and well perfused, no clubbing, cyanosis, or edema.  NEURO: No focal findings. Normal speech. Mood and affect normal.   SKIN: Normal without suspicious lesions on exposed skin.      Medications:      Current Facility-Administered Medications:     acetaminophen (TYLENOL) tablet 650 mg, 650 mg, Oral, Q6H PRN, Rosamaria Miller PA-C, 650 mg at 02/04/24 1058    albuterol (PROVENTIL HFA,VENTOLIN HFA) inhaler 2 puff, 2 puff, Inhalation, Q4H PRN, Hawa Marsh MD, 2 puff at 01/23/24 1520    apixaban (ELIQUIS) tablet 5 mg, 5 mg, Oral, BID, Hawa Marsh MD, 5 mg at 02/05/24 1023    atorvastatin (LIPITOR) tablet 40 mg, 40 mg, Oral, Daily With Dinner, Hawa Marsh MD, 40 mg at 02/04/24 1717    budesonide (PULMICORT) inhalation solution 0.5 mg, 0.5 mg, Nebulization, Q12H, Hawa Marsh MD, 0.5 mg at 02/05/24 0857    bumetanide (BUMEX) tablet 4 mg, 4 mg, Oral, BID, Rupal Mercedes PA-C, 4 mg at 02/05/24 1023    docusate sodium (COLACE) capsule 100 mg, 100 mg,  Oral, BID, Hawa Marsh MD, 100 mg at 02/05/24 1023    Empagliflozin (JARDIANCE) tablet 10 mg, 10 mg, Oral, Daily, Chuck Harrell PA-C, 10 mg at 02/05/24 1023    escitalopram (LEXAPRO) tablet 20 mg, 20 mg, Oral, Daily, Hawa Marsh MD, 20 mg at 02/05/24 1023    insulin glargine (LANTUS) subcutaneous injection 45 Units 0.45 mL, 45 Units, Subcutaneous, HS, Rupal Mercedes PA-C    insulin lispro (HumaLOG) 100 units/mL subcutaneous injection 1-5 Units, 1-5 Units, Subcutaneous, TID AC, 2 Units at 02/05/24 1017 **AND** Fingerstick Glucose (POCT), , , TID AC, Hawa Marsh MD    insulin lispro (HumaLOG) 100 units/mL subcutaneous injection 1-5 Units, 1-5 Units, Subcutaneous, HS, Hawa Marsh MD, 2 Units at 02/04/24 2105    insulin lispro (HumaLOG) 100 units/mL subcutaneous injection 15 Units, 15 Units, Subcutaneous, TID With Meals, Rupal Mercedes PA-C, 15 Units at 02/05/24 1017    loratadine (CLARITIN) tablet 10 mg, 10 mg, Oral, Daily, Hawa Marsh MD, 10 mg at 02/05/24 1023    [START ON 2/7/2024] metolazone (ZAROXOLYN) tablet 2.5 mg, 2.5 mg, Oral, Once per day on Monday Wednesday Friday **AND** [START ON 2/7/2024] potassium chloride (Klor-Con M20) CR tablet 40 mEq, 40 mEq, Oral, Once per day on Monday Wednesday Friday, Fran John PA-C    metoprolol succinate (TOPROL-XL) 24 hr tablet 100 mg, 100 mg, Oral, Daily, Hawa Marsh MD, 100 mg at 02/04/24 0909    montelukast (SINGULAIR) tablet 10 mg, 10 mg, Oral, HS, Hawa Marsh MD, 10 mg at 02/04/24 2103    ondansetron (ZOFRAN) injection 4 mg, 4 mg, Intravenous, Q4H PRN, Francois Cadet PA-C, 4 mg at 02/03/24 1900    pantoprazole (PROTONIX) EC tablet 40 mg, 40 mg, Oral, Daily Before Breakfast, Hawa Marsh MD, 40 mg at 02/05/24 0644    potassium chloride (Klor-Con M20) CR tablet 40 mEq, 40 mEq, Oral, TID With Meals, Rosamaria Miller PA-C, 40 mEq at 02/05/24 0658    traZODone (DESYREL) tablet 150 mg, 150 mg, Oral, HS, Hawa Marsh MD, 150 mg at 02/04/24 2103     Labs &  Results:        Results from last 7 days   Lab Units 01/31/24  0358   WBC Thousand/uL 11.78*   HEMOGLOBIN g/dL 10.3*   HEMATOCRIT % 36.6   PLATELETS Thousands/uL 318         Results from last 7 days   Lab Units 02/05/24  0454 02/04/24  0409 02/03/24  0519   POTASSIUM mmol/L 2.5* 3.0* 2.7*   CHLORIDE mmol/L 78* 75* 80*   CO2 mmol/L 44* 42* 40*   BUN mg/dL 47* 42* 39*   CREATININE mg/dL 0.87 0.98 0.83   CALCIUM mg/dL 8.6 8.5 7.8*         Results from last 7 days   Lab Units 02/04/24  0409 02/03/24  0519   MAGNESIUM mg/dL 3.0* 2.6

## 2024-02-05 NOTE — CASE MANAGEMENT
Case Management Discharge Planning Note    Patient name Mattie Joy  Location /-01 MRN [id number]  : 1960 Date 2024       Current Admission Date: 2024  Current Admission Diagnosis:Acute on chronic hypercapnic respiratory failure (HCC)   Patient Active Problem List    Diagnosis Date Noted    IVELISSE (acute kidney injury) (HCC) 2024    Eosinophilic asthma 2023    COPD, severe (HCC) 2023    Hepatic steatosis 2023    Morbid obesity (HCC) 02/15/2023    Diabetic polyneuropathy associated with type 2 diabetes mellitus (HCC) 11/15/2021    Type 2 diabetes mellitus with hyperglycemia (HCC) 2021    Tubular adenoma of colon 2021    Transaminitis 2021    Gastric polyp 2021    Class 3 severe obesity in adult (HCC) 2021    Pulmonary hypertension (HCC) 2020    Acute on chronic hypercapnic respiratory failure (HCC) 2020    Insomnia 10/28/2020    Abnormal CT of the chest 2020    Lactic acidosis 2020    Acute on chronic heart failure with preserved ejection fraction (HCC) 2020    Microcytic anemia 2020    Neck pain, chronic 2019    Current moderate episode of major depressive disorder without prior episode (HCC) 2019    Paroxysmal atrial fibrillation (HCC) 2018    Severe persistent asthma 2018    Hypokalemia 2018    Abnormal computed tomography angiography (CTA) of abdomen 2018    Abnormal CT of the abdomen 2018    Iron deficiency anemia due to chronic blood loss 2018    Type 2 diabetes mellitus with stage 2 chronic kidney disease, with long-term current use of insulin (HCC)     Ganglion cyst of flexor tendon sheath 2017    Paresthesia of upper extremity 2017    Cervical radiculopathy 2017    Elevated liver enzymes 2015    Sexual dysfunction 2014    Chronic low back pain 10/15/2014    Hypercholesterolemia 2014    Lumbar radiculopathy  09/09/2014    Esophageal reflux 06/23/2014    Hypertensive heart and chronic kidney disease with heart failure and stage 1 through stage 4 chronic kidney disease, or chronic kidney disease (HCC) 03/24/2014    Obstructive sleep apnea 03/09/2014      LOS (days): 14  Geometric Mean LOS (GMLOS) (days): 3.9  Days to GMLOS:-10.1     OBJECTIVE:  Risk of Unplanned Readmission Score: 39.38         Current admission status: Inpatient   Preferred Pharmacy:   [facility] [address]  [address] [facility] [address]  Phone: [phone] Fax: [phone]    [facility] - [place], [place] - [id number] [facility] [address]  [place] [address]  Phone: [phone] Fax: [phone]    Primary Care Provider: Laith Olivares MD    Primary Insurance: MEDICARE  Secondary Insurance: [employer]    DISCHARGE DETAILS:    IMM Given (Date):: 02/05/24  IMM Given to:: Patient     Additional Comments: Met with Pt. Pt confirms plan for Pt to return home with [facility]. Pt reports her friend(Viktor) will transport home.

## 2024-02-05 NOTE — ASSESSMENT & PLAN NOTE
Wt Readings from Last 3 Encounters:   02/05/24 119 kg (263 lb 3.2 oz)   11/09/23 129 kg (284 lb 12.8 oz)   09/20/23 128 kg (283 lb)     Appears volume overloaded  Worsening shortness of breath with bilateral lower extremity edema  Chest x-ray-pulmonary venous congestion  BNP-174, in obese patient  Echo 08/23-preserved EF, diastolic dysfunction, right ventricular systolic pressure 61  Home regimen-Bumex 2 mg twice daily  Has been on IV Bumex drip  Metolazone added 2/1 x1 - per cardiology note 2/2 plan to add MWF on discharge with increased dose of oral bumex   Fluid restriction 1500 mL  Unfortunately patient has been noncompliant with both fluid and salt restriction despite extensive education  Salt restriction  Strict I's and O's  Appreciate Cardiology input  Weight appears to have plateaued.  Na decreasing, BUN/Cr increased.  Discussed with cardiology 2/4, will stop bumex gtt and transition to oral bumex 4 mg BID in addition to diamox 250 mg BID x2 doses given rising CO2  Low-normal BP this AM, TT sent to cardiology regarding follow up and further recommendations

## 2024-02-05 NOTE — ASSESSMENT & PLAN NOTE
Lab Results   Component Value Date    HGBA1C 8.5 (H) 01/22/2024       Recent Labs     02/04/24  1634 02/04/24  2057 02/05/24  0721 02/05/24  1203   POCGLU 378* 294* 258* 314*         Blood Sugar Average: Last 72 hrs:  (P) 285.0371679751021619  Currently uncontrolled, as per recent A1c  Home regimen consists of glargine 30 units at bedtime, insulin lispro 30 units with breakfast, 15 units with lunch and dinner, metformin 500 mg twice daily and Ozempic weekly  Continue with insulin glargine 30 units at bedtime  Increase to 45 units qhs on 2/5  Increased humalog to 15 units TID on 2/5  Additional coverage with sliding scale  Adjust regimen as needed  Carb consistent diet - unfortunately extremely noncompliant.  Per nursing staff frequently asking for various snacks throughout the day  Frequent Accu-Cheks and hypoglycemia protocol

## 2024-02-06 LAB
ANION GAP SERPL CALCULATED.3IONS-SCNC: 7 MMOL/L
BASOPHILS # BLD AUTO: 0.04 THOUSANDS/ÂΜL (ref 0–0.1)
BASOPHILS NFR BLD AUTO: 0 % (ref 0–1)
BUN SERPL-MCNC: 41 MG/DL (ref 5–25)
CALCIUM SERPL-MCNC: 9.1 MG/DL (ref 8.4–10.2)
CHLORIDE SERPL-SCNC: 86 MMOL/L (ref 96–108)
CO2 SERPL-SCNC: 40 MMOL/L (ref 21–32)
CREAT SERPL-MCNC: 0.71 MG/DL (ref 0.6–1.3)
EOSINOPHIL # BLD AUTO: 0 THOUSAND/ÂΜL (ref 0–0.61)
EOSINOPHIL NFR BLD AUTO: 0 % (ref 0–6)
ERYTHROCYTE [DISTWIDTH] IN BLOOD BY AUTOMATED COUNT: 18.6 % (ref 11.6–15.1)
GFR SERPL CREATININE-BSD FRML MDRD: 89 ML/MIN/1.73SQ M
GLUCOSE SERPL-MCNC: 188 MG/DL (ref 65–140)
GLUCOSE SERPL-MCNC: 206 MG/DL (ref 65–140)
GLUCOSE SERPL-MCNC: 217 MG/DL (ref 65–140)
GLUCOSE SERPL-MCNC: 240 MG/DL (ref 65–140)
GLUCOSE SERPL-MCNC: 328 MG/DL (ref 65–140)
HCT VFR BLD AUTO: 37.6 % (ref 34.8–46.1)
HGB BLD-MCNC: 10.8 G/DL (ref 11.5–15.4)
IMM GRANULOCYTES # BLD AUTO: 0.06 THOUSAND/UL (ref 0–0.2)
IMM GRANULOCYTES NFR BLD AUTO: 1 % (ref 0–2)
LYMPHOCYTES # BLD AUTO: 1.48 THOUSANDS/ÂΜL (ref 0.6–4.47)
LYMPHOCYTES NFR BLD AUTO: 13 % (ref 14–44)
MCH RBC QN AUTO: 19.5 PG (ref 26.8–34.3)
MCHC RBC AUTO-ENTMCNC: 28.7 G/DL (ref 31.4–37.4)
MCV RBC AUTO: 68 FL (ref 82–98)
MONOCYTES # BLD AUTO: 1.01 THOUSAND/ÂΜL (ref 0.17–1.22)
MONOCYTES NFR BLD AUTO: 9 % (ref 4–12)
NEUTROPHILS # BLD AUTO: 8.8 THOUSANDS/ÂΜL (ref 1.85–7.62)
NEUTS SEG NFR BLD AUTO: 77 % (ref 43–75)
NRBC BLD AUTO-RTO: 0 /100 WBCS
PLATELET # BLD AUTO: 332 THOUSANDS/UL (ref 149–390)
PMV BLD AUTO: 11.7 FL (ref 8.9–12.7)
POTASSIUM SERPL-SCNC: 3.2 MMOL/L (ref 3.5–5.3)
RBC # BLD AUTO: 5.54 MILLION/UL (ref 3.81–5.12)
SODIUM SERPL-SCNC: 133 MMOL/L (ref 135–147)
WBC # BLD AUTO: 11.39 THOUSAND/UL (ref 4.31–10.16)

## 2024-02-06 PROCEDURE — 94660 CPAP INITIATION&MGMT: CPT

## 2024-02-06 PROCEDURE — 99222 1ST HOSP IP/OBS MODERATE 55: CPT | Performed by: STUDENT IN AN ORGANIZED HEALTH CARE EDUCATION/TRAINING PROGRAM

## 2024-02-06 PROCEDURE — 94760 N-INVAS EAR/PLS OXIMETRY 1: CPT

## 2024-02-06 PROCEDURE — 85025 COMPLETE CBC W/AUTO DIFF WBC: CPT | Performed by: INTERNAL MEDICINE

## 2024-02-06 PROCEDURE — 94640 AIRWAY INHALATION TREATMENT: CPT

## 2024-02-06 PROCEDURE — 99233 SBSQ HOSP IP/OBS HIGH 50: CPT | Performed by: INTERNAL MEDICINE

## 2024-02-06 PROCEDURE — 80048 BASIC METABOLIC PNL TOTAL CA: CPT | Performed by: INTERNAL MEDICINE

## 2024-02-06 PROCEDURE — 82948 REAGENT STRIP/BLOOD GLUCOSE: CPT

## 2024-02-06 PROCEDURE — 99232 SBSQ HOSP IP/OBS MODERATE 35: CPT | Performed by: PHYSICIAN ASSISTANT

## 2024-02-06 RX ORDER — METOLAZONE 2.5 MG/1
2.5 TABLET ORAL 3 TIMES WEEKLY
Status: DISCONTINUED | OUTPATIENT
Start: 2024-02-09 | End: 2024-02-07

## 2024-02-06 RX ORDER — BUMETANIDE 1 MG/1
4 TABLET ORAL 2 TIMES DAILY
Status: DISCONTINUED | OUTPATIENT
Start: 2024-02-06 | End: 2024-02-07

## 2024-02-06 RX ORDER — INSULIN GLARGINE 100 [IU]/ML
52 INJECTION, SOLUTION SUBCUTANEOUS
Status: DISCONTINUED | OUTPATIENT
Start: 2024-02-06 | End: 2024-02-08

## 2024-02-06 RX ORDER — INSULIN LISPRO 100 [IU]/ML
20 INJECTION, SOLUTION INTRAVENOUS; SUBCUTANEOUS
Status: DISCONTINUED | OUTPATIENT
Start: 2024-02-06 | End: 2024-02-07

## 2024-02-06 RX ORDER — POTASSIUM CHLORIDE 20 MEQ/1
40 TABLET, EXTENDED RELEASE ORAL 3 TIMES WEEKLY
Status: DISCONTINUED | OUTPATIENT
Start: 2024-02-09 | End: 2024-02-06

## 2024-02-06 RX ORDER — METOLAZONE 2.5 MG/1
2.5 TABLET ORAL 3 TIMES WEEKLY
Status: DISCONTINUED | OUTPATIENT
Start: 2024-02-09 | End: 2024-02-06

## 2024-02-06 RX ORDER — METOPROLOL SUCCINATE 50 MG/1
100 TABLET, EXTENDED RELEASE ORAL DAILY
Status: DISCONTINUED | OUTPATIENT
Start: 2024-02-07 | End: 2024-02-09 | Stop reason: HOSPADM

## 2024-02-06 RX ORDER — POTASSIUM CHLORIDE 20 MEQ/1
40 TABLET, EXTENDED RELEASE ORAL 3 TIMES WEEKLY
Status: DISCONTINUED | OUTPATIENT
Start: 2024-02-09 | End: 2024-02-07

## 2024-02-06 RX ORDER — NYSTATIN 100000 [USP'U]/G
POWDER TOPICAL 2 TIMES DAILY
Status: DISCONTINUED | OUTPATIENT
Start: 2024-02-06 | End: 2024-02-09 | Stop reason: HOSPADM

## 2024-02-06 RX ORDER — SPIRONOLACTONE 25 MG/1
25 TABLET ORAL DAILY
Status: DISCONTINUED | OUTPATIENT
Start: 2024-02-06 | End: 2024-02-07

## 2024-02-06 RX ADMIN — ESCITALOPRAM OXALATE 20 MG: 20 TABLET ORAL at 08:31

## 2024-02-06 RX ADMIN — LORATADINE 10 MG: 10 TABLET ORAL at 08:31

## 2024-02-06 RX ADMIN — BUDESONIDE 0.5 MG: 0.5 INHALANT ORAL at 19:22

## 2024-02-06 RX ADMIN — PANTOPRAZOLE SODIUM 40 MG: 40 TABLET, DELAYED RELEASE ORAL at 05:31

## 2024-02-06 RX ADMIN — DOCUSATE SODIUM 100 MG: 100 CAPSULE, LIQUID FILLED ORAL at 17:15

## 2024-02-06 RX ADMIN — BUMETANIDE 4 MG: 1 TABLET ORAL at 17:14

## 2024-02-06 RX ADMIN — INSULIN LISPRO 20 UNITS: 100 INJECTION, SOLUTION INTRAVENOUS; SUBCUTANEOUS at 17:15

## 2024-02-06 RX ADMIN — NYSTATIN: 100000 POWDER TOPICAL at 21:17

## 2024-02-06 RX ADMIN — INSULIN LISPRO 2 UNITS: 100 INJECTION, SOLUTION INTRAVENOUS; SUBCUTANEOUS at 21:21

## 2024-02-06 RX ADMIN — METOPROLOL SUCCINATE 100 MG: 50 TABLET, EXTENDED RELEASE ORAL at 08:31

## 2024-02-06 RX ADMIN — ATORVASTATIN CALCIUM 40 MG: 40 TABLET, FILM COATED ORAL at 17:15

## 2024-02-06 RX ADMIN — INSULIN LISPRO 15 UNITS: 100 INJECTION, SOLUTION INTRAVENOUS; SUBCUTANEOUS at 08:35

## 2024-02-06 RX ADMIN — DOCUSATE SODIUM 100 MG: 100 CAPSULE, LIQUID FILLED ORAL at 08:30

## 2024-02-06 RX ADMIN — BUMETANIDE 4 MG: 1 TABLET ORAL at 08:31

## 2024-02-06 RX ADMIN — INSULIN LISPRO 1 UNITS: 100 INJECTION, SOLUTION INTRAVENOUS; SUBCUTANEOUS at 17:15

## 2024-02-06 RX ADMIN — EMPAGLIFLOZIN 10 MG: 10 TABLET, FILM COATED ORAL at 08:30

## 2024-02-06 RX ADMIN — POTASSIUM CHLORIDE 40 MEQ: 1500 TABLET, EXTENDED RELEASE ORAL at 08:31

## 2024-02-06 RX ADMIN — BUDESONIDE 0.5 MG: 0.5 INHALANT ORAL at 07:46

## 2024-02-06 RX ADMIN — APIXABAN 5 MG: 5 TABLET, FILM COATED ORAL at 08:31

## 2024-02-06 RX ADMIN — INSULIN GLARGINE 52 UNITS: 100 INJECTION, SOLUTION SUBCUTANEOUS at 21:20

## 2024-02-06 RX ADMIN — INSULIN LISPRO 1 UNITS: 100 INJECTION, SOLUTION INTRAVENOUS; SUBCUTANEOUS at 08:35

## 2024-02-06 RX ADMIN — POTASSIUM CHLORIDE 40 MEQ: 1500 TABLET, EXTENDED RELEASE ORAL at 12:01

## 2024-02-06 RX ADMIN — INSULIN LISPRO 15 UNITS: 100 INJECTION, SOLUTION INTRAVENOUS; SUBCUTANEOUS at 12:01

## 2024-02-06 RX ADMIN — INSULIN LISPRO 3 UNITS: 100 INJECTION, SOLUTION INTRAVENOUS; SUBCUTANEOUS at 12:01

## 2024-02-06 RX ADMIN — MONTELUKAST 10 MG: 10 TABLET, FILM COATED ORAL at 21:17

## 2024-02-06 RX ADMIN — TRAZODONE HYDROCHLORIDE 150 MG: 100 TABLET ORAL at 21:17

## 2024-02-06 RX ADMIN — POTASSIUM CHLORIDE 40 MEQ: 1500 TABLET, EXTENDED RELEASE ORAL at 17:15

## 2024-02-06 RX ADMIN — APIXABAN 5 MG: 5 TABLET, FILM COATED ORAL at 17:15

## 2024-02-06 NOTE — ASSESSMENT & PLAN NOTE
Wt Readings from Last 3 Encounters:   02/06/24 120 kg (264 lb)   11/09/23 129 kg (284 lb 12.8 oz)   09/20/23 128 kg (283 lb)     Appears volume overloaded  Worsening shortness of breath with bilateral lower extremity edema  Chest x-ray-pulmonary venous congestion  BNP-174, in obese patient  Echo 08/23-preserved EF, diastolic dysfunction, right ventricular systolic pressure 61  Home regimen-Bumex 2 mg twice daily  Has been on IV Bumex drip  Metolazone added 2/1 x1 - per cardiology note 2/2 plan to add MWF on discharge with increased dose of oral bumex   Fluid restriction 1500 mL  Unfortunately patient has been noncompliant with both fluid and salt restriction despite extensive education  Salt restriction  Strict I's and O's  Appreciate Cardiology input  Weight appears to have plateaued over the weekend.  Na decreasing, BUN/Cr increased.  Discussed with cardiology 2/4, will stop bumex gtt and transition to oral bumex 4 mg BID in addition to diamox 250 mg BID x2 doses given rising CO2  Plan to initiate metolazone 2/7 as plan for MWF dosing on discharge.  Recommend monitoring 24-48 hrs on oral regimen prior to discharge

## 2024-02-06 NOTE — PROGRESS NOTES
[facility]  Progress Note  Name: Mattie Joy I  MRN: [id number]  Unit/Bed#: -01 I Date of Admission: 1/22/2024   Date of Service: 2/6/2024 I Hospital Day: 15    Assessment/Plan   * Acute on chronic hypercapnic respiratory failure (HCC)  Assessment & Plan  Baseline oxygen requirement 4 L continuously  Underlying severe COPD, severe pulmonary hypertension and diastolic heart failure  BNP-174  COVID/respiratory panel-negative  Chest x-ray consistent with pulmonary venous congestion, final reading pending  IV Solu-Medrol discontinued  Transition to oral bumex 2/4 - Metolazone added 2/1 x1  Continue with telemetry  Appreciate Cardiology and Pulmonology recommendations  Respiratory protocol  Monitor volume status  Strict I's and O's  Fluid restriction  Resolved - weaned to baseline of 4 L nasal cannula    Acute on chronic heart failure with preserved ejection fraction (HCC)  Assessment & Plan  Wt Readings from Last 3 Encounters:   02/06/24 120 kg (264 lb)   11/09/23 129 kg (284 lb 12.8 oz)   09/20/23 128 kg (283 lb)     Appears volume overloaded  Worsening shortness of breath with bilateral lower extremity edema  Chest x-ray-pulmonary venous congestion  BNP-174, in obese patient  Echo 08/23-preserved EF, diastolic dysfunction, right ventricular systolic pressure 61  Home regimen-Bumex 2 mg twice daily  Has been on IV Bumex drip  Metolazone added 2/1 x1 - per cardiology note 2/2 plan to add MWF on discharge with increased dose of oral bumex   Fluid restriction 1500 mL  Unfortunately patient has been noncompliant with both fluid and salt restriction despite extensive education  Salt restriction  Strict I's and O's  Appreciate Cardiology input  Weight appears to have plateaued over the weekend.  Na decreasing, BUN/Cr increased.  Discussed with cardiology 2/4, will stop bumex gtt and transition to oral bumex 4 mg BID in addition to diamox 250 mg BID x2 doses given rising CO2  Plan to  initiate metolazone 2/7 as plan for MWF dosing on discharge.  Recommend monitoring 24-48 hrs on oral regimen prior to discharge    COPD, severe (HCC)  Assessment & Plan  Home regimen consists of benralizumab, Singulair, Pulmicort nebulization twice daily, Xopenex and albuterol as needed, Bevespi twice daily  Was seen at pulmonology office on 1/22, had respiratory distress, was given prednisone taper and urged to go to ED for evaluation  Status post IV Solu-Medrol 125 mg in ED  IV Solu-Medrol 40mg discontinued  Respiratory protocol  Continue with Pulmicort nebulization twice daily  Albuterol and Xopenex as needed  Singulair 10 mg daily  Inpatient consult to Pulmonology -no further inpatient recommendations      Eosinophilic asthma  Assessment & Plan  On benralizumab every 8 weeks  Follows with Pulmonology    Morbid obesity (HCC)  Assessment & Plan  Counseling about adaptation of healthy dietary habits and lifestyle modifications, once medically stable  BMI >50 on admission - decreasing with diuresis   Affects all aspects of care    Paroxysmal atrial fibrillation (HCC)  Assessment & Plan  Rate control with Toprol- mg daily  Anticoagulated with Eliquis 5 mg twice daily    Hypokalemia  Assessment & Plan  Low potassium in setting of recent aggressive IV diuresis now on oral bumex  Continue repletion  Trend BMP  Monitor on telemetry    Type 2 diabetes mellitus with stage 2 chronic kidney disease, with long-term current use of insulin ([facility])  Assessment & Plan  Lab Results   Component Value Date    HGBA1C 8.5 (H) 01/22/2024       Recent Labs     02/05/24  1540 02/05/24  2143 02/06/24  0705 02/06/24  1131   POCGLU 173* 246* 206* 328*       Blood Sugar Average: Last 72 hrs:  (P) 279.4  Currently uncontrolled, as per recent A1c  Home regimen consists of glargine 30 units at bedtime, insulin lispro 30 units with breakfast, 15 units with lunch and dinner, metformin 500 mg twice daily and Ozempic weekly  Continue with  insulin glargine 30 units at bedtime  Increase to 45 units qhs on 2/5  Increased humalog to 15 units TID on 2/5  Additional coverage with sliding scale  Adjust regimen as needed  Carb consistent diet - unfortunately extremely noncompliant.  Per nursing staff frequently asking for various snacks throughout the day  Given difficulty in managing glucose will consult endocrinology  Frequent Accu-Cheks and hypoglycemia protocol    Hypercholesterolemia  Assessment & Plan  Continue with atorvastatin 40 mg daily at bedtime    Obstructive sleep apnea  Assessment & Plan  Uses CPAP at home  Follows with Pulmonology on outpatient, awaiting BiPAP study with transcutaneous oxygen monitoring  Continue with BiPAP while inpatient    Esophageal reflux  Assessment & Plan  Continue with Protonix         VTE Pharmacologic Prophylaxis: VTE Score: 8 High Risk (Score >/= 5) - Pharmacological DVT Prophylaxis Ordered: apixaban (Eliquis). Sequential Compression Devices Ordered.    Mobility:   Basic Mobility Inpatient Raw Score: 23  JH-HLM Goal: 7: Walk 25 feet or more  JH-HLM Achieved: 7: Walk 25 feet or more  HLM Goal achieved. Continue to encourage appropriate mobility.    Patient Centered Rounds: I performed bedside rounds with nursing staff today.   Discussions with Specialists or Other Care Team Provider: CM    Education and Discussions with Family / Patient: Patient declined call to .     Total Time Spent on Date of Encounter in care of patient: 45 mins. This time was spent on one or more of the following: performing physical exam; counseling and coordination of care; obtaining or reviewing history; documenting in the medical record; reviewing/ordering tests, medications or procedures; communicating with other healthcare professionals and discussing with patient's family/caregivers.    Current Length of Stay: 15 day(s)  Current Patient Status: Inpatient   Certification Statement: The patient will continue to require  additional inpatient hospital stay due to monitoring on oral diuretics, ongoing cardiology recommendations  Discharge Plan: Anticipate discharge in 48 hrs to home with home services.    Code Status: Level 1 - Full Code    Subjective:   Continues to feel well.  Denies chest pain/palpitations, shortness of breath, nausea/vomiting, abdominal pain, constipation or diarrhea.    Objective:     Vitals:   Temp (24hrs), Av.4 °F (36.3 °C), Min:97.3 °F (36.3 °C), Max:97.6 °F (36.4 °C)    Temp:  [97.3 °F (36.3 °C)-97.6 °F (36.4 °C)] 97.6 °F (36.4 °C)  HR:  [] 111  Resp:  [16-20] 20  BP: ()/(55-70) 116/68  SpO2:  [94 %-100 %] 100 %  Body mass index is 46.77 kg/m².     Input and Output Summary (last 24 hours):     Intake/Output Summary (Last 24 hours) at 2024 1349  Last data filed at 2024 1334  Gross per 24 hour   Intake 3563 ml   Output 5350 ml   Net -1787 ml       Physical Exam:   Physical Exam  Vitals and nursing note reviewed.   Constitutional:       Appearance: Normal appearance.      Comments: No acute distress   HENT:      Head: Normocephalic.   Eyes:      General: No scleral icterus.     Extraocular Movements: Extraocular movements intact.      Conjunctiva/sclera: Conjunctivae normal.   Cardiovascular:      Rate and Rhythm: Normal rate. Rhythm irregularly irregular.   Pulmonary:      Breath sounds: Decreased breath sounds present. No wheezing, rhonchi or rales.   Abdominal:      General: Bowel sounds are normal.      Palpations: Abdomen is soft.      Tenderness: There is no abdominal tenderness. There is no guarding or rebound.   Musculoskeletal:         General: No swelling, tenderness or deformity.      Cervical back: Normal range of motion.      Comments: Able to move upper/lower extremities bilaterally, no edema   Skin:     General: Skin is warm and dry.   Neurological:      Mental Status: She is alert and oriented to person, place, and time.   Psychiatric:         Mood and Affect: Mood normal.          Speech: Speech normal.         Behavior: Behavior normal.          Additional Data:     Labs:  Results from last 7 days   Lab Units 02/06/24  0530   WBC Thousand/uL 11.39*   HEMOGLOBIN g/dL 10.8*   HEMATOCRIT % 37.6   PLATELETS Thousands/uL 332   NEUTROS PCT % 77*   LYMPHS PCT % 13*   MONOS PCT % 9   EOS PCT % 0     Results from last 7 days   Lab Units 02/06/24  0530   SODIUM mmol/L 133*   POTASSIUM mmol/L 3.2*   CHLORIDE mmol/L 86*   CO2 mmol/L 40*   BUN mg/dL 41*   CREATININE mg/dL 0.71   ANION GAP mmol/L 7   CALCIUM mg/dL 9.1   GLUCOSE RANDOM mg/dL 240*         Results from last 7 days   Lab Units 02/06/24  1131 02/06/24  0705 02/05/24  2143 02/05/24  1540 02/05/24  1203 02/05/24  0721 02/04/24  2057 02/04/24  1634 02/04/24  1123 02/04/24  0714 02/03/24  2251 02/03/24  1825   POC GLUCOSE mg/dl 328* 206* 246* 173* 314* 258* 294* 378* 207* 202* 208* 492*               Lines/Drains:  Invasive Devices       Peripheral Intravenous Line  Duration             Peripheral IV 02/04/24 Right;Ventral (anterior) Forearm 1 day                      Telemetry:  Telemetry Orders (From admission, onward)               24 Hour Telemetry Monitoring  Continuous x 24 Hours (Telem)        Question:  Reason for 24 Hour Telemetry  Answer:  Decompensated CHF- and any one of the following: continuous diuretic infusion or total diuretic dose >200 mg daily, associated electrolyte derangement (I.e. K < 3.0), ionotropic drip (continuous infusion), hx of ventricular arrhythmia, or new EF < 35%                     Telemetry Reviewed: Atrial fibrillation. HR averaging 90  Indication for Continued Telemetry Use: Metabolic/electrolyte disturbance with high probability of dysrhythmia             Imaging: No pertinent imaging reviewed.    Recent Cultures (last 7 days):         Last 24 Hours Medication List:   Current Facility-Administered Medications   Medication Dose Route Frequency Provider Last Rate    acetaminophen  650 mg Oral Q6H PRN  Rosamaria Miller PA-C      albuterol  2 puff Inhalation Q4H PRN Hawa Marsh MD      apixaban  5 mg Oral BID Hawa Marsh MD      atorvastatin  40 mg Oral Daily With Dinner Hawa Marsh MD      budesonide  0.5 mg Nebulization Q12H Hawa Marsh MD      bumetanide  4 mg Oral BID Chuck Harrell PA-C      docusate sodium  100 mg Oral BID Hawa Marsh MD      Empagliflozin  10 mg Oral Daily Chuck Harrell PA-C      escitalopram  20 mg Oral Daily Hawa Marsh MD      insulin glargine  45 Units Subcutaneous HS Rupal Mercedes PA-C      insulin lispro  1-5 Units Subcutaneous TID AC Hawa Marsh MD      insulin lispro  1-5 Units Subcutaneous HS Hawa Marsh MD      insulin lispro  15 Units Subcutaneous TID With Meals Rupal Mercedes PA-C      loratadine  10 mg Oral Daily Hawa Marsh MD      [START ON 2/9/2024] metolazone  2.5 mg Oral Once per day on Monday Wednesday Friday Chuck Harrell PA-C      And    [START ON 2/9/2024] potassium chloride  40 mEq Oral Once per day on Monday Wednesday Friday Chuck Harrell PA-C      [START ON 2/7/2024] metoprolol succinate  100 mg Oral Daily Chuck Harrell PA-C      montelukast  10 mg Oral HS Hawa Marsh MD      ondansetron  4 mg Intravenous Q4H PRN Francois Cadet PA-C      pantoprazole  40 mg Oral Daily Before Breakfast Hawa Marsh MD      potassium chloride  40 mEq Oral TID With Meals Rosamaria Miller PA-C      traZODone  150 mg Oral HS Hawa Marsh MD          Today, Patient Was Seen By: Rupal Mercedes PA-C    **Please Note: This note may have been constructed using a voice recognition system.**

## 2024-02-06 NOTE — PROGRESS NOTES
Progress Note - Cardiology   Mattie Joy 63 y.o. female MRN: [id number]  Unit/Bed#: -01 Encounter: [id number]        Problem List:  Principal Problem:    Acute on chronic hypercapnic respiratory failure (HCC)  Active Problems:    Esophageal reflux    Obstructive sleep apnea    Hypercholesterolemia    Type 2 diabetes mellitus with stage 2 chronic kidney disease, with long-term current use of insulin (HCC)    Hypokalemia    Paroxysmal atrial fibrillation (HCC)    Pulmonary hypertension (HCC)    Morbid obesity (HCC)    Eosinophilic asthma    COPD, severe (HCC)    IVELISSE (acute kidney injury) (HCC)    Acute on chronic heart failure with preserved ejection fraction (HCC)      Assessment:  Acute on chronic diastolic heart failure  8/15/2023 echo: LVEF 65%, grade 2 diastolic dysfunction, severely elevated RV systolic pressure 61 mmHg  Current diuretic: Bumex 4 BID, potassium 40 TID, metolazone (new) 2.5 MWF with 40 extra potassium MWF  Prior weight as low as 278 in August 2023 at which time she was reportedly euvolemic  Paroxysmal atrial fibrillation  Thromboembolic PPx: Eliquis 5 twice daily  Rate control: Toprol- daily  COPD with acute exacerbation  Type 2 diabetes  Obesity, BMI 51  Pulmonary hypertension likely group 2/3 from left heart disease, underlying lung disease, OHV  Acute kidney injury, resolved    -She has been transitioned to oral diuretics but now further titration is hindered by persistent hypokalemia  -Despite 120 potassium daily she is still at 3.2 today  -We will hold off on starting metolazone until potassium is improved  -Her standing weight is neck 264 pounds, down 25 pounds from admission    Plan/ Discussion:  Replete K+ today  Tentative hold metolazone tomorrow until potassium increases  Continue PO bumex  Continue metoprolol and Eliquis     Subjective:  Feeling about the same today compared to yesterday    Vitals:  Vitals:    02/06/24 0254 02/06/24 0537   Weight: 122 kg (268 lb 15.4 oz)  120 kg (264 lb)   ,  Vitals:    02/06/24 0329 02/06/24 0537 02/06/24 0709 02/06/24 0827   BP:  101/67 101/62 116/68   BP Location:       Pulse:  96 98 (!) 111   Resp:   20    Temp:   97.6 °F (36.4 °C)    TempSrc:       SpO2: 99% 99% 100% 100%   Weight:  120 kg (264 lb)     Height:           Exam:  General: Alert awake and oriented, no acute distress  Heart:  Regular rate and rhythm, no murmurs, Normal S1, no edema    Respiratory effort/ Lungs:  Breathing comfortably on NC oxygen, clear bilaterally without wheezing, rales, crackles   Abdominal: Non-tender to palpation, + bowel sounds, soft, no masses or distension  Lower Limbs:  No edema            Telemetry:       Atrial fibrillation, Heart Rate     Medications:    Current Facility-Administered Medications:     acetaminophen (TYLENOL) tablet 650 mg, 650 mg, Oral, Q6H PRN, Rosamaria Miller PA-C, 650 mg at 02/04/24 1058    albuterol (PROVENTIL HFA,VENTOLIN HFA) inhaler 2 puff, 2 puff, Inhalation, Q4H PRN, Hawa Marsh MD, 2 puff at 01/23/24 1520    apixaban (ELIQUIS) tablet 5 mg, 5 mg, Oral, BID, Hawa Marsh MD, 5 mg at 02/06/24 0831    atorvastatin (LIPITOR) tablet 40 mg, 40 mg, Oral, Daily With Dinner, Hawa Marsh MD, 40 mg at 02/05/24 1606    budesonide (PULMICORT) inhalation solution 0.5 mg, 0.5 mg, Nebulization, Q12H, Hawa Marsh MD, 0.5 mg at 02/06/24 0746    bumetanide (BUMEX) tablet 4 mg, 4 mg, Oral, BID, Fran John PA-C, 4 mg at 02/06/24 0831    docusate sodium (COLACE) capsule 100 mg, 100 mg, Oral, BID, Hawa Marsh MD, 100 mg at 02/06/24 0830    Empagliflozin (JARDIANCE) tablet 10 mg, 10 mg, Oral, Daily, Chuck Harrell PA-C, 10 mg at 02/06/24 0830    escitalopram (LEXAPRO) tablet 20 mg, 20 mg, Oral, Daily, Hawa Marsh MD, 20 mg at 02/06/24 0831    insulin glargine (LANTUS) subcutaneous injection 45 Units 0.45 mL, 45 Units, Subcutaneous, HS, Rupal Mercedes PA-C, 45 Units at 02/05/24 2145    insulin lispro (HumaLOG) 100 units/mL subcutaneous  injection 1-5 Units, 1-5 Units, Subcutaneous, TID AC, 1 Units at 02/06/24 0835 **AND** Fingerstick Glucose (POCT), , , TID AC, Hawa Marsh MD    insulin lispro (HumaLOG) 100 units/mL subcutaneous injection 1-5 Units, 1-5 Units, Subcutaneous, HS, Hawa Marsh MD, 2 Units at 02/05/24 2146    insulin lispro (HumaLOG) 100 units/mL subcutaneous injection 15 Units, 15 Units, Subcutaneous, TID With Meals, Rupal Mercedes PA-C, 15 Units at 02/06/24 0835    loratadine (CLARITIN) tablet 10 mg, 10 mg, Oral, Daily, Hawa Marsh MD, 10 mg at 02/06/24 0831    [START ON 2/7/2024] metolazone (ZAROXOLYN) tablet 2.5 mg, 2.5 mg, Oral, Once per day on Monday Wednesday Friday **AND** [START ON 2/7/2024] potassium chloride (Klor-Con M20) CR tablet 40 mEq, 40 mEq, Oral, Once per day on Monday Wednesday Friday, Fran John PA-C    metoprolol succinate (TOPROL-XL) 24 hr tablet 100 mg, 100 mg, Oral, Daily, Hawa Marsh MD, 100 mg at 02/06/24 0831    montelukast (SINGULAIR) tablet 10 mg, 10 mg, Oral, HS, Hawa Marsh MD, 10 mg at 02/05/24 2144    ondansetron (ZOFRAN) injection 4 mg, 4 mg, Intravenous, Q4H PRN, Francois Cadet PA-C, 4 mg at 02/03/24 1900    pantoprazole (PROTONIX) EC tablet 40 mg, 40 mg, Oral, Daily Before Breakfast, Hawa Marsh MD, 40 mg at 02/06/24 0531    potassium chloride (Klor-Con M20) CR tablet 40 mEq, 40 mEq, Oral, TID With Meals, Rosamaria Miller PA-C, 40 mEq at 02/06/24 0831    traZODone (DESYREL) tablet 150 mg, 150 mg, Oral, HS, Hawa Marsh MD, 150 mg at 02/05/24 2144      Labs/Data:        Results from last 7 days   Lab Units 02/06/24  0530 01/31/24  0358   WBC Thousand/uL 11.39* 11.78*   HEMOGLOBIN g/dL 10.8* 10.3*   HEMATOCRIT % 37.6 36.6   PLATELETS Thousands/uL 332 318     Results from last 7 days   Lab Units 02/06/24  0530 02/05/24  1813 02/05/24  0454 02/04/24  0409   POTASSIUM mmol/L 3.2* 3.7 2.5* 3.0*   CHLORIDE mmol/L 86*  --  78* 75*   CO2 mmol/L 40*  --  44* 42*   BUN mg/dL 41*  --  47* 42*    CREATININE mg/dL 0.71  --  0.87 0.98

## 2024-02-06 NOTE — ASSESSMENT & PLAN NOTE
Low potassium in setting of recent aggressive IV diuresis now on oral bumex  Continue repletion  Trend BMP  Monitor on telemetry

## 2024-02-06 NOTE — ASSESSMENT & PLAN NOTE
Lab Results   Component Value Date    HGBA1C 8.5 (H) 01/22/2024       Recent Labs     02/05/24  1540 02/05/24  2143 02/06/24  0705 02/06/24  1131   POCGLU 173* 246* 206* 328*       Blood Sugar Average: Last 72 hrs:  (P) 279.4  Currently uncontrolled, as per recent A1c  Home regimen consists of glargine 30 units at bedtime, insulin lispro 30 units with breakfast, 15 units with lunch and dinner, metformin 500 mg twice daily and Ozempic weekly  Continue with insulin glargine 30 units at bedtime  Increase to 45 units qhs on 2/5  Increased humalog to 15 units TID on 2/5  Additional coverage with sliding scale  Adjust regimen as needed  Carb consistent diet - unfortunately extremely noncompliant.  Per nursing staff frequently asking for various snacks throughout the day  Given difficulty in managing glucose will consult endocrinology  Frequent Accu-Cheks and hypoglycemia protocol

## 2024-02-06 NOTE — CONSULTS
Consultation - Mattie Joy 63 y.o. female MRN: [id number]    Unit/Bed#: -01 Encounter: [id number]      Assessment/Plan     Assessment:  This is a 63 y.o.-year-old female with diabetes with hyperglycemia admitted for ARF d/t COPD/HF. Endocrine consulted for BG management. Baseline poor diabetic control made worse by steroids induced hyperglycemia. BG still above goal. BG have been poorly controlled on this with fasting and PPH and hence would recommend adjusting both for now    Type 2 diabetes mellitus with hyperglycemia  HbA1c- 8.5% (best A1C she's had in a while)  Home regimen: Lantus 30u, Apidra 30/15/15, ozempic 0.25mg weekly, metformin 1000mg BID  Inpatient regimen: Lantus 45u and lispro 15u with meals          Plan:  - Increase lantus to 52u   - Inc lispro to 20u with each meal  - highly discourage snacking between meals. If hungry can have no carb snacks     Inpatient consult to Endocrinology  Consult performed by: Marixa Lucas MD  Consult ordered by: Rupal Mercedes PA-C          CC: T2DM    History of Present Illness     HPI: Mattie Joy is a 63 y.o. year old female with type 2 for 8 years on insulin, ozempic, metformin with poor control, COPD, CHF, GERD who was admitted for Acute on chronic respiratory failure d/t HF/COPD. Started on IV methyl prednisone initially and now d/c. Resumed on home diuretics. Endocrine consulted for hyperglycemia management     Home regime- lantus 30u, Apidra 30ubk, 15u ln an dinner, metformin 1000mg BID, ozempic 0.25mg weekly   Current regime- lantus 45u daily, lispro 15u with meals   BG control- fasting , 206 this AM, post prandial in 170-300 range   BG at home- reports in upper 200 range but not as high as 300 like in hospital  Patient does have several dietary indiscretions and requests snacks from staff in between meals and overnight.     Review of Systems   Constitutional:  Negative for chills and fever.   HENT:  Negative for ear pain and sore throat.     Eyes:  Negative for pain and visual disturbance.   Respiratory:  Negative for cough and shortness of breath.    Cardiovascular:  Negative for chest pain and palpitations.   Gastrointestinal:  Negative for abdominal pain and vomiting.   Genitourinary:  Negative for dysuria and hematuria.   Musculoskeletal:  Negative for arthralgias and back pain.   Skin:  Negative for color change and rash.   Neurological:  Negative for seizures and syncope.   All other systems reviewed and are negative.      Historical Information   Past Medical History:   Diagnosis Date    Acute blood loss anemia 06/01/2021    Acute diverticulitis 05/02/2021    Acute on chronic diastolic congestive heart failure (HCC) 05/21/2020    Acute on chronic respiratory failure with hypoxia ([facility]) 11/30/2018    Alcohol abuse 05/21/2020    Anemia     Asthma with COPD with exacerbation  11/12/2020    Atrial fibrillation ([facility])     Cervical radiculopathy     CHF (congestive heart failure) ([facility])     Chronic low back pain     Chronic obstructive asthma 02/20/2018    Cigarette nicotine dependence without complication 11/20/2019    Quit 12/10/2019.     Community acquired pneumonia 05/21/2020    COPD exacerbation ([facility]) 09/16/2020    Depression with anxiety 03/09/2014    Diabetes mellitus with hyperglycemia ([facility])     Diverticulitis 06/06/2021    Elevated liver enzymes     GERD (gastroesophageal reflux disease)     Hypersomnolence 10/28/2020    Hypokalemia 12/04/2018    Lower gastrointestinal bleeding 06/01/2021    Paresthesia of upper extremity     Plantar fasciitis     Restless legs syndrome 04/08/2014    Severe persistent asthma with exacerbation 02/20/2018    PFTs February 8, 2018:  FEV1 0.87 L-35% predicted, 27% improvement post-bronchodilator.  DLCO-82% predicted,  The patient has been having worsening of her symptoms now for few months, especially  from January 2020, also she had multiple exacerbations last year and most recently required a steroid burst and  August  Reviewing her CT scan  New PFTs with severe obstruction reviewed  Hypersensitivity p    Sexual dysfunction     Sleep apnea, obstructive     Tenosynovitis of finger     Tinea corporis     Tobacco use 2018    Currently smoking 3-4 cigarettes daily    Trigger middle finger of left hand 2017    Trigger ring finger of left hand 2017    Weakness of upper extremity      Past Surgical History:   Procedure Laterality Date    ABDOMINAL SURGERY      CARPAL TUNNEL RELEASE Bilateral      SECTION      CHOLECYSTECTOMY      laparoscopic    ESOPHAGOGASTRODUODENOSCOPY N/A 12/3/2018    Procedure: ESOPHAGOGASTRODUODENOSCOPY (EGD) AND COLONOSCOPY;  Surgeon: Ludmila Perry MD;  Location: [facility];  Service: Gastroenterology    GASTRIC BYPASS      for morbid obesity with gilda en y    HERNIA REPAIR      HYSTERECTOMY      AL EXCISION GANGLION WRIST DORSAL/VOLAR PRIMARY Left 2017    Procedure: LONG FINGER GANGLION CYST EXCISION;  Surgeon: Philippe Clark MD;  Location: [facility];  Service: Orthopedics    AL TENDON SHEATH INCISION Left 2017    Procedure: THUMB, LONG, RING, TRIGGER FINGER RELEASE;  Surgeon: Philippe Clark MD;  Location: [facility];  Service: Orthopedics    SHOULDER SURGERY Right      Social History   Social History     Substance and Sexual Activity   Alcohol Use Not Currently    Alcohol/week: 20.0 standard drinks of alcohol    Types: 20 Cans of beer per week    Comment: about 6 coors light every night, stopped in 2019     Social History     Substance and Sexual Activity   Drug Use No     Social History     Tobacco Use   Smoking Status Former    Current packs/day: 0.00    Average packs/day: 0.3 packs/day for 29.9 years (7.5 ttl pk-yrs)    Types: Cigarettes    Start date:     Quit date: 12/10/2019    Years since quittin.1   Smokeless Tobacco Never     Family History:   Family History   Problem Relation Age of Onset    Alzheimer's disease Mother     Atrial  fibrillation Mother     Dementia Mother     Heart disease Mother         heart problem    Seizures Mother     Parkinsonism Father     Arthritis Father     Atrial fibrillation Father     No Known Problems Daughter     Cri-du-chat syndrome Daughter     Colon cancer Maternal Grandmother 80    Diabetes type II Maternal Grandmother     No Known Problems Brother     No Known Problems Son     Substance Abuse Neg Hx         mother,father    Mental illness Neg Hx         mother,father    Colon polyps Neg Hx        Meds/Allergies   Current Facility-Administered Medications   Medication Dose Route Frequency Provider Last Rate Last Admin    acetaminophen (TYLENOL) tablet 650 mg  650 mg Oral Q6H PRN Rosamaria Miller PA-C   650 mg at 02/04/24 1058    albuterol (PROVENTIL HFA,VENTOLIN HFA) inhaler 2 puff  2 puff Inhalation Q4H PRN Hawa Marsh MD   2 puff at 01/23/24 1520    apixaban (ELIQUIS) tablet 5 mg  5 mg Oral BID Hawa Marsh MD   5 mg at 02/06/24 0831    atorvastatin (LIPITOR) tablet 40 mg  40 mg Oral Daily With Dinner Hawa Marsh MD   40 mg at 02/05/24 1606    budesonide (PULMICORT) inhalation solution 0.5 mg  0.5 mg Nebulization Q12H Hawa Marsh MD   0.5 mg at 02/06/24 0746    bumetanide (BUMEX) tablet 4 mg  4 mg Oral BID Chuck Harrell PA-C        docusate sodium (COLACE) capsule 100 mg  100 mg Oral BID Hawa Marsh MD   100 mg at 02/06/24 0830    Empagliflozin (JARDIANCE) tablet 10 mg  10 mg Oral Daily Chuck Harrell PA-C   10 mg at 02/06/24 0830    escitalopram (LEXAPRO) tablet 20 mg  20 mg Oral Daily Hawa Marsh MD   20 mg at 02/06/24 0831    insulin glargine (LANTUS) subcutaneous injection 45 Units 0.45 mL  45 Units Subcutaneous HS Rupal Mercedes PA-C   45 Units at 02/05/24 2145    insulin lispro (HumaLOG) 100 units/mL subcutaneous injection 1-5 Units  1-5 Units Subcutaneous TID AC Hawa Marsh MD   3 Units at 02/06/24 1201    insulin lispro (HumaLOG) 100 units/mL subcutaneous injection 1-5 Units  1-5 Units  Subcutaneous HS Hawa Marsh MD   2 Units at 02/05/24 2146    insulin lispro (HumaLOG) 100 units/mL subcutaneous injection 15 Units  15 Units Subcutaneous TID With Meals Rupal Mercedes PA-C   15 Units at 02/06/24 1201    loratadine (CLARITIN) tablet 10 mg  10 mg Oral Daily Hawa Marsh MD   10 mg at 02/06/24 0831    [START ON 2/9/2024] metolazone (ZAROXOLYN) tablet 2.5 mg  2.5 mg Oral Once per day on Monday Wednesday Friday Chuck Harrell PA-C        And    [START ON 2/9/2024] potassium chloride (Klor-Con M20) CR tablet 40 mEq  40 mEq Oral Once per day on Monday Wednesday Friday Chuck Harrell PA-C        [START ON 2/7/2024] metoprolol succinate (TOPROL-XL) 24 hr tablet 100 mg  100 mg Oral Daily Chuck Harrell PA-C        montelukast (SINGULAIR) tablet 10 mg  10 mg Oral HS Hawa Marsh MD   10 mg at 02/05/24 2144    ondansetron (ZOFRAN) injection 4 mg  4 mg Intravenous Q4H PRN Francois Cadet PA-C   4 mg at 02/03/24 1900    pantoprazole (PROTONIX) EC tablet 40 mg  40 mg Oral Daily Before Breakfast Hawa Marsh MD   40 mg at 02/06/24 0531    potassium chloride (Klor-Con M20) CR tablet 40 mEq  40 mEq Oral TID With Meals Rosamaria Miller PA-C   40 mEq at 02/06/24 1201    spironolactone (ALDACTONE) tablet 25 mg  25 mg Oral Daily Lars Richardson MD        traZODone (DESYREL) tablet 150 mg  150 mg Oral HS Hawa Marsh MD   150 mg at 02/05/24 2144     Allergies   Allergen Reactions    Iron Dextran Swelling    Januvia [Sitagliptin] Shortness Of Breath    Jardiance [Empagliflozin] Shortness Of Breath    Clonazepam Other (See Comments)     Unknown reaction    Codeine Itching and Other (See Comments)     itch;mary kay morphine,takes ultram @home    Adhesive [Medical Tape] Itching     itching    Effexor [Venlafaxine] Itching    Lactose - Food Allergy GI Intolerance    Oxycodone-Acetaminophen Anxiety    Oxycodone-Acetaminophen Itching and Rash     Other reaction(s): Other (See Comments)  (PERCOCET) MILD RASH, not allergic to  "Acetaminophen        Objective   Vitals: Blood pressure 103/74, pulse 102, temperature (!) 97.3 °F (36.3 °C), resp. rate 16, height 5' 3\" (1.6 m), weight 120 kg (264 lb), SpO2 96%, not currently breastfeeding.    Intake/Output Summary (Last 24 hours) at 2/6/2024 1452  Last data filed at 2/6/2024 1334  Gross per 24 hour   Intake 3163 ml   Output 4550 ml   Net -1387 ml     Invasive Devices       Peripheral Intravenous Line  Duration             Peripheral IV 02/04/24 Right;Ventral (anterior) Forearm 2 days                    Physical Exam  Constitutional:       Appearance: Normal appearance. She is obese.   Cardiovascular:      Rate and Rhythm: Normal rate and regular rhythm.      Pulses: Normal pulses.   Pulmonary:      Effort: Pulmonary effort is normal.   Abdominal:      General: Abdomen is flat. Bowel sounds are normal.      Palpations: Abdomen is soft.   Skin:     General: Skin is warm and dry.      Capillary Refill: Capillary refill takes less than 2 seconds.   Neurological:      General: No focal deficit present.      Mental Status: She is alert and oriented to person, place, and time.   Psychiatric:         Mood and Affect: Mood normal.         The history was obtained from the review of the chart, patient.    Lab Results:        Latest Reference Range & Units 07/27/22 13:07 10/25/22 10:25 02/06/23 11:38 05/11/23 10:05 09/20/23 14:46 12/28/23 11:17 01/22/24 15:34   Hemoglobin A1C Normal 4.0-5.6%; PreDiabetic 5.7-6.4%; Diabetic >=6.5%; Glycemic control for adults with diabetes <7.0% % 10.1 ! 9.5 (H) 9.9 (H) 9.9 (H) 10.7 ! 9.5 (H) 8.5 (H)   !: Data is abnormal  (H): Data is abnormally high  Imaging Studies: I have personally reviewed pertinent reports.      Portions of the record may have been created with voice recognition software.    "

## 2024-02-07 LAB
ANION GAP SERPL CALCULATED.3IONS-SCNC: 6 MMOL/L
BUN SERPL-MCNC: 35 MG/DL (ref 5–25)
CALCIUM SERPL-MCNC: 9.2 MG/DL (ref 8.4–10.2)
CHLORIDE SERPL-SCNC: 91 MMOL/L (ref 96–108)
CO2 SERPL-SCNC: 38 MMOL/L (ref 21–32)
CREAT SERPL-MCNC: 0.73 MG/DL (ref 0.6–1.3)
GFR SERPL CREATININE-BSD FRML MDRD: 86 ML/MIN/1.73SQ M
GLUCOSE SERPL-MCNC: 136 MG/DL (ref 65–140)
GLUCOSE SERPL-MCNC: 160 MG/DL (ref 65–140)
GLUCOSE SERPL-MCNC: 190 MG/DL (ref 65–140)
GLUCOSE SERPL-MCNC: 203 MG/DL (ref 65–140)
GLUCOSE SERPL-MCNC: 205 MG/DL (ref 65–140)
POTASSIUM SERPL-SCNC: 4 MMOL/L (ref 3.5–5.3)
SODIUM SERPL-SCNC: 135 MMOL/L (ref 135–147)

## 2024-02-07 PROCEDURE — 80048 BASIC METABOLIC PNL TOTAL CA: CPT | Performed by: INTERNAL MEDICINE

## 2024-02-07 PROCEDURE — 94640 AIRWAY INHALATION TREATMENT: CPT

## 2024-02-07 PROCEDURE — 82948 REAGENT STRIP/BLOOD GLUCOSE: CPT

## 2024-02-07 PROCEDURE — 99232 SBSQ HOSP IP/OBS MODERATE 35: CPT | Performed by: PHYSICIAN ASSISTANT

## 2024-02-07 PROCEDURE — 94660 CPAP INITIATION&MGMT: CPT

## 2024-02-07 PROCEDURE — 94760 N-INVAS EAR/PLS OXIMETRY 1: CPT

## 2024-02-07 RX ORDER — SPIRONOLACTONE 25 MG/1
25 TABLET ORAL DAILY
Status: DISCONTINUED | OUTPATIENT
Start: 2024-02-08 | End: 2024-02-09 | Stop reason: HOSPADM

## 2024-02-07 RX ORDER — BUMETANIDE 1 MG/1
5 TABLET ORAL 2 TIMES DAILY
Status: DISCONTINUED | OUTPATIENT
Start: 2024-02-07 | End: 2024-02-09 | Stop reason: HOSPADM

## 2024-02-07 RX ORDER — POTASSIUM CHLORIDE 20 MEQ/1
40 TABLET, EXTENDED RELEASE ORAL DAILY
Status: DISCONTINUED | OUTPATIENT
Start: 2024-02-07 | End: 2024-02-09 | Stop reason: HOSPADM

## 2024-02-07 RX ORDER — METOLAZONE 2.5 MG/1
2.5 TABLET ORAL
Status: DISCONTINUED | OUTPATIENT
Start: 2024-02-07 | End: 2024-02-07

## 2024-02-07 RX ORDER — POTASSIUM CHLORIDE 20 MEQ/1
40 TABLET, EXTENDED RELEASE ORAL
Status: DISCONTINUED | OUTPATIENT
Start: 2024-02-07 | End: 2024-02-07

## 2024-02-07 RX ORDER — INSULIN LISPRO 100 [IU]/ML
23 INJECTION, SOLUTION INTRAVENOUS; SUBCUTANEOUS
Status: DISCONTINUED | OUTPATIENT
Start: 2024-02-07 | End: 2024-02-08

## 2024-02-07 RX ORDER — BUMETANIDE 1 MG/1
1 TABLET ORAL ONCE
Status: COMPLETED | OUTPATIENT
Start: 2024-02-07 | End: 2024-02-07

## 2024-02-07 RX ADMIN — ATORVASTATIN CALCIUM 40 MG: 40 TABLET, FILM COATED ORAL at 17:23

## 2024-02-07 RX ADMIN — APIXABAN 5 MG: 5 TABLET, FILM COATED ORAL at 17:24

## 2024-02-07 RX ADMIN — SPIRONOLACTONE 25 MG: 25 TABLET ORAL at 08:10

## 2024-02-07 RX ADMIN — APIXABAN 5 MG: 5 TABLET, FILM COATED ORAL at 08:10

## 2024-02-07 RX ADMIN — EMPAGLIFLOZIN 10 MG: 10 TABLET, FILM COATED ORAL at 08:10

## 2024-02-07 RX ADMIN — INSULIN LISPRO 1 UNITS: 100 INJECTION, SOLUTION INTRAVENOUS; SUBCUTANEOUS at 12:11

## 2024-02-07 RX ADMIN — LORATADINE 10 MG: 10 TABLET ORAL at 08:10

## 2024-02-07 RX ADMIN — ACETAMINOPHEN 325MG 650 MG: 325 TABLET ORAL at 05:36

## 2024-02-07 RX ADMIN — NYSTATIN: 100000 POWDER TOPICAL at 17:23

## 2024-02-07 RX ADMIN — BUDESONIDE 0.5 MG: 0.5 INHALANT ORAL at 07:18

## 2024-02-07 RX ADMIN — PANTOPRAZOLE SODIUM 40 MG: 40 TABLET, DELAYED RELEASE ORAL at 05:36

## 2024-02-07 RX ADMIN — INSULIN LISPRO 20 UNITS: 100 INJECTION, SOLUTION INTRAVENOUS; SUBCUTANEOUS at 12:11

## 2024-02-07 RX ADMIN — BUMETANIDE 4 MG: 1 TABLET ORAL at 08:10

## 2024-02-07 RX ADMIN — INSULIN LISPRO 23 UNITS: 100 INJECTION, SOLUTION INTRAVENOUS; SUBCUTANEOUS at 17:23

## 2024-02-07 RX ADMIN — DOCUSATE SODIUM 100 MG: 100 CAPSULE, LIQUID FILLED ORAL at 17:24

## 2024-02-07 RX ADMIN — BUMETANIDE 5 MG: 1 TABLET ORAL at 17:24

## 2024-02-07 RX ADMIN — NYSTATIN 1 APPLICATION: 100000 POWDER TOPICAL at 09:25

## 2024-02-07 RX ADMIN — INSULIN GLARGINE 52 UNITS: 100 INJECTION, SOLUTION SUBCUTANEOUS at 21:44

## 2024-02-07 RX ADMIN — INSULIN LISPRO 1 UNITS: 100 INJECTION, SOLUTION INTRAVENOUS; SUBCUTANEOUS at 17:24

## 2024-02-07 RX ADMIN — METOPROLOL SUCCINATE 100 MG: 50 TABLET, EXTENDED RELEASE ORAL at 08:10

## 2024-02-07 RX ADMIN — MONTELUKAST 10 MG: 10 TABLET, FILM COATED ORAL at 21:44

## 2024-02-07 RX ADMIN — TRAZODONE HYDROCHLORIDE 150 MG: 100 TABLET ORAL at 21:44

## 2024-02-07 RX ADMIN — POTASSIUM CHLORIDE 40 MEQ: 1500 TABLET, EXTENDED RELEASE ORAL at 08:10

## 2024-02-07 RX ADMIN — INSULIN LISPRO 1 UNITS: 100 INJECTION, SOLUTION INTRAVENOUS; SUBCUTANEOUS at 21:44

## 2024-02-07 RX ADMIN — INSULIN LISPRO 20 UNITS: 100 INJECTION, SOLUTION INTRAVENOUS; SUBCUTANEOUS at 08:10

## 2024-02-07 RX ADMIN — ESCITALOPRAM OXALATE 20 MG: 20 TABLET ORAL at 08:10

## 2024-02-07 RX ADMIN — BUMETANIDE 1 MG: 1 TABLET ORAL at 09:28

## 2024-02-07 RX ADMIN — DOCUSATE SODIUM 100 MG: 100 CAPSULE, LIQUID FILLED ORAL at 08:10

## 2024-02-07 RX ADMIN — BUDESONIDE 0.5 MG: 0.5 INHALANT ORAL at 19:08

## 2024-02-07 NOTE — ASSESSMENT & PLAN NOTE
Low potassium in setting of recent aggressive IV diuresis now on oral bumex  Continue repletion - improved to 4.0.  Will decrease potassium supplementation to daily  Trend BMP  Monitor on telemetry

## 2024-02-07 NOTE — ASSESSMENT & PLAN NOTE
Wt Readings from Last 3 Encounters:   02/07/24 121 kg (267 lb 3.2 oz)   11/09/23 129 kg (284 lb 12.8 oz)   09/20/23 128 kg (283 lb)     Appears volume overloaded  Worsening shortness of breath with bilateral lower extremity edema  Chest x-ray-pulmonary venous congestion  BNP-174, in obese patient  Echo 08/23-preserved EF, diastolic dysfunction, right ventricular systolic pressure 61  Home regimen-Bumex 2 mg twice daily  Had been on IV Bumex drip  Metolazone added 2/1 x1   Fluid restriction 1500 mL  Unfortunately patient has been noncompliant with both fluid and salt restriction despite extensive education  Salt restriction  Strict I's and O's  Appreciate Cardiology input  Given concern for compliance, per discussion with cardiology AP will attempt to simplify regimen with increasing Bumex to 5 mg twice daily.  Spironolactone added 2/6 to help with hypokalemia.  Previously considered metolazone MWF w/ additional potassium supplementation but will monitor on oral bumex/spironolactone for now  Cardiology recommends ongoing inpatient observation on oral diuretic therapy for at least 48-72 hrs as patient is high risk for heart failure readmission

## 2024-02-07 NOTE — PROGRESS NOTES
[facility]  Progress Note  Name: Mattie Joy I  MRN: [id number]  Unit/Bed#: -01 I Date of Admission: 1/22/2024   Date of Service: 2/7/2024 I Hospital Day: 16    Assessment/Plan   * Acute on chronic hypercapnic respiratory failure (HCC)  Assessment & Plan  Baseline oxygen requirement 4 L continuously  Underlying severe COPD, severe pulmonary hypertension and diastolic heart failure  BNP-174  COVID/respiratory panel-negative  Chest x-ray consistent with pulmonary venous congestion, final reading pending  IV Solu-Medrol discontinued  Transition to oral bumex 2/4 - Metolazone added 2/1 x1  Continue with telemetry  Appreciate Cardiology and Pulmonology recommendations  Respiratory protocol  Monitor volume status  Strict I's and O's  Fluid restriction  Resolved - weaned to baseline of 4 L nasal cannula    Acute on chronic heart failure with preserved ejection fraction (HCC)  Assessment & Plan  Wt Readings from Last 3 Encounters:   02/07/24 121 kg (267 lb 3.2 oz)   11/09/23 129 kg (284 lb 12.8 oz)   09/20/23 128 kg (283 lb)     Appears volume overloaded  Worsening shortness of breath with bilateral lower extremity edema  Chest x-ray-pulmonary venous congestion  BNP-174, in obese patient  Echo 08/23-preserved EF, diastolic dysfunction, right ventricular systolic pressure 61  Home regimen-Bumex 2 mg twice daily  Had been on IV Bumex drip  Metolazone added 2/1 x1   Fluid restriction 1500 mL  Unfortunately patient has been noncompliant with both fluid and salt restriction despite extensive education  Salt restriction  Strict I's and O's  Appreciate Cardiology input  Given concern for compliance, per discussion with cardiology AP will attempt to simplify regimen with increasing Bumex to 5 mg twice daily.  Spironolactone added 2/6 to help with hypokalemia.  Previously considered metolazone MWF w/ additional potassium supplementation but will monitor on oral bumex/spironolactone for  now  Cardiology recommends ongoing inpatient observation on oral diuretic therapy for at least 48-72 hrs as patient is high risk for heart failure readmission    COPD, severe (HCC)  Assessment & Plan  Home regimen consists of benralizumab, Singulair, Pulmicort nebulization twice daily, Xopenex and albuterol as needed, Bevespi twice daily  Was seen at pulmonology office on 1/22, had respiratory distress, was given prednisone taper and urged to go to ED for evaluation  Status post IV Solu-Medrol 125 mg in ED  IV Solu-Medrol 40mg discontinued  Respiratory protocol  Continue with Pulmicort nebulization twice daily  Albuterol and Xopenex as needed  Singulair 10 mg daily  Inpatient consult to Pulmonology -no further inpatient recommendations      Eosinophilic asthma  Assessment & Plan  On benralizumab every 8 weeks  Follows with Pulmonology    Morbid obesity (HCC)  Assessment & Plan  Counseling about adaptation of healthy dietary habits and lifestyle modifications, once medically stable  BMI >50 on admission - decreasing with diuresis   Affects all aspects of care    Paroxysmal atrial fibrillation (HCC)  Assessment & Plan  Rate control with Toprol- mg daily  Anticoagulated with Eliquis 5 mg twice daily    Hypokalemia  Assessment & Plan  Low potassium in setting of recent aggressive IV diuresis now on oral bumex  Continue repletion - improved to 4.0.  Will decrease potassium supplementation to daily  Trend BMP  Monitor on telemetry    Type 2 diabetes mellitus with stage 2 chronic kidney disease, with long-term current use of insulin ([facility])  Assessment & Plan  Lab Results   Component Value Date    HGBA1C 8.5 (H) 01/22/2024       Recent Labs     02/06/24  1627 02/06/24  2052 02/07/24  0714 02/07/24  1119   POCGLU 188* 217* 136 203*         Blood Sugar Average: Last 72 hrs:  (P) [id number]  Currently uncontrolled, as per recent A1c  Home regimen consists of glargine 30 units at bedtime, insulin lispro 30 units  with breakfast, 15 units with lunch and dinner, metformin 500 mg twice daily and Ozempic weekly  Continue with insulin glargine 30 units at bedtime  Increase to 45 units qhs on 2/5  Increased humalog to 15 units TID on 2/5  Additional coverage with sliding scale  Adjust regimen as needed  Carb consistent diet - unfortunately extremely noncompliant.  Per nursing staff frequently asking for various snacks throughout the day  Given difficulty in managing glucose will consult endocrinology 2/6  Lantus increased to 52 units qhs  Humalog increased to 23 units 3 times daily with meals  Frequent Accu-Cheks and hypoglycemia protocol    Obstructive sleep apnea  Assessment & Plan  Uses CPAP at home  Follows with Pulmonology on outpatient, awaiting BiPAP study with transcutaneous oxygen monitoring  Continue with BiPAP while inpatient         VTE Pharmacologic Prophylaxis: VTE Score: 8 High Risk (Score >/= 5) - Pharmacological DVT Prophylaxis Ordered: apixaban (Eliquis). Sequential Compression Devices Ordered.    Mobility:   Basic Mobility Inpatient Raw Score: 23  JH-HLM Goal: 7: Walk 25 feet or more  JH-HLM Achieved: 7: Walk 25 feet or more  HLM Goal achieved. Continue to encourage appropriate mobility.    Patient Centered Rounds: I performed bedside rounds with nursing staff today.   Discussions with Specialists or Other Care Team Provider: CM, cardiology AP    Education and Discussions with Family / Patient: Patient declined call to .     Total Time Spent on Date of Encounter in care of patient: 40 mins. This time was spent on one or more of the following: performing physical exam; counseling and coordination of care; obtaining or reviewing history; documenting in the medical record; reviewing/ordering tests, medications or procedures; communicating with other healthcare professionals and discussing with patient's family/caregivers.    Current Length of Stay: 16 day(s)  Current Patient Status: Inpatient    Certification Statement: The patient will continue to require additional inpatient hospital stay due to monitoring on oral diuretic therapy given high risk readmission for heart failure  Discharge Plan: Anticipate discharge in 48-72 hrs to home with home services.    Code Status: Level 1 - Full Code    Subjective:   Patient continues to feel well.  Denies chest pain/palpitations, shortness of breath, nausea/vomiting, abdominal pain, fever/chills.    Objective:     Vitals:   Temp (24hrs), Av.9 °F (36.6 °C), Min:97.5 °F (36.4 °C), Max:98.1 °F (36.7 °C)    Temp:  [97.5 °F (36.4 °C)-98.1 °F (36.7 °C)] 98.1 °F (36.7 °C)  HR:  [] 101  Resp:  [18-28] 20  BP: ()/(54-76) 102/56  SpO2:  [94 %-100 %] 98 %  Body mass index is 47.33 kg/m².     Input and Output Summary (last 24 hours):     Intake/Output Summary (Last 24 hours) at 2024 1548  Last data filed at 2024 1333  Gross per 24 hour   Intake 2510 ml   Output 5200 ml   Net -2690 ml       Physical Exam:   Physical Exam  Vitals and nursing note reviewed.   Constitutional:       Appearance: Normal appearance.      Comments: No acute distress   HENT:      Head: Normocephalic.   Eyes:      General: No scleral icterus.     Extraocular Movements: Extraocular movements intact.      Conjunctiva/sclera: Conjunctivae normal.   Cardiovascular:      Rate and Rhythm: Normal rate. Rhythm irregularly irregular.   Pulmonary:      Breath sounds: Decreased breath sounds present. No wheezing, rhonchi or rales.   Abdominal:      General: Bowel sounds are normal.      Palpations: Abdomen is soft.      Tenderness: There is no abdominal tenderness. There is no guarding or rebound.   Musculoskeletal:         General: No swelling, tenderness or deformity.      Cervical back: Normal range of motion.      Comments: Able to move upper/lower extremities bilaterally, no edema   Skin:     General: Skin is warm and dry.   Neurological:      Mental Status: She is alert and oriented  to person, place, and time.   Psychiatric:         Mood and Affect: Mood normal.         Speech: Speech normal.         Behavior: Behavior normal.          Additional Data:     Labs:  Results from last 7 days   Lab Units 02/06/24  0530   WBC Thousand/uL 11.39*   HEMOGLOBIN g/dL 10.8*   HEMATOCRIT % 37.6   PLATELETS Thousands/uL 332   NEUTROS PCT % 77*   LYMPHS PCT % 13*   MONOS PCT % 9   EOS PCT % 0     Results from last 7 days   Lab Units 02/07/24  0336   SODIUM mmol/L 135   POTASSIUM mmol/L 4.0   CHLORIDE mmol/L 91*   CO2 mmol/L 38*   BUN mg/dL 35*   CREATININE mg/dL 0.73   ANION GAP mmol/L 6   CALCIUM mg/dL 9.2   GLUCOSE RANDOM mg/dL 160*         Results from last 7 days   Lab Units 02/07/24  1119 02/07/24  0714 02/06/24  2052 02/06/24  1627 02/06/24  1131 02/06/24  0705 02/05/24  2143 02/05/24  1540 02/05/24  1203 02/05/24  0721 02/04/24  2057 02/04/24  1634   POC GLUCOSE mg/dl 203* 136 217* 188* 328* 206* 246* 173* 314* 258* 294* 378*               Lines/Drains:  Invasive Devices       Peripheral Intravenous Line  Duration             Peripheral IV 02/04/24 Right;Ventral (anterior) Forearm 3 days                      Telemetry:  Telemetry Orders (From admission, onward)               24 Hour Telemetry Monitoring  Continuous x 24 Hours (Telem)        Question:  Reason for 24 Hour Telemetry  Answer:  Decompensated CHF- and any one of the following: continuous diuretic infusion or total diuretic dose >200 mg daily, associated electrolyte derangement (I.e. K < 3.0), ionotropic drip (continuous infusion), hx of ventricular arrhythmia, or new EF < 35%                     Telemetry Reviewed: Atrial fibrillation. HR averaging 90  Indication for Continued Telemetry Use: Acute CHF on >200 mg lasix/day or equivalent dose or with new reduced EF.              Imaging: No pertinent imaging reviewed.    Recent Cultures (last 7 days):         Last 24 Hours Medication List:   Current Facility-Administered Medications    Medication Dose Route Frequency Provider Last Rate    acetaminophen  650 mg Oral Q6H PRN Rosamaria Miller PA-C      albuterol  2 puff Inhalation Q4H PRN Hawa Marsh MD      apixaban  5 mg Oral BID Hawa Marsh MD      atorvastatin  40 mg Oral Daily With Dinner Hawa Marsh MD      budesonide  0.5 mg Nebulization Q12H Hawa Marsh MD      bumetanide  5 mg Oral BID Chuck Harrell PA-C      docusate sodium  100 mg Oral BID Hawa Marsh MD      Empagliflozin  10 mg Oral Daily Chuck Harrell PA-C      escitalopram  20 mg Oral Daily Hawa Marsh MD      insulin glargine  52 Units Subcutaneous HS Marixa Lucas MD      insulin lispro  1-5 Units Subcutaneous TID AC Hawa Marsh MD      insulin lispro  1-5 Units Subcutaneous HS Haaw Marsh MD      insulin lispro  23 Units Subcutaneous TID With Meals Marixa Lucas MD      loratadine  10 mg Oral Daily Hawa Marsh MD      metoprolol succinate  100 mg Oral Daily Chuck Harrell PA-C      montelukast  10 mg Oral HS Hawa Marsh MD      nystatin   Topical BID Tia Li PA-C      ondansetron  4 mg Intravenous Q4H PRN Francois Cadet PA-C      pantoprazole  40 mg Oral Daily Before Breakfast Hawa Marsh MD      potassium chloride  40 mEq Oral Daily Rupal Mercedes PA-C      [START ON 2/8/2024] spironolactone  25 mg Oral Daily Chuck Harrell PA-C      traZODone  150 mg Oral HS Hawa Marsh MD          Today, Patient Was Seen By: Rupal Mercedes PA-C    **Please Note: This note may have been constructed using a voice recognition system.**

## 2024-02-07 NOTE — ASSESSMENT & PLAN NOTE
Lab Results   Component Value Date    HGBA1C 8.5 (H) 01/22/2024       Recent Labs     02/06/24  1627 02/06/24 2052 02/07/24  0714 02/07/24  1119   POCGLU 188* 217* 136 203*         Blood Sugar Average: Last 72 hrs:  (P) [id number]  Currently uncontrolled, as per recent A1c  Home regimen consists of glargine 30 units at bedtime, insulin lispro 30 units with breakfast, 15 units with lunch and dinner, metformin 500 mg twice daily and Ozempic weekly  Continue with insulin glargine 30 units at bedtime  Increase to 45 units qhs on 2/5  Increased humalog to 15 units TID on 2/5  Additional coverage with sliding scale  Adjust regimen as needed  Carb consistent diet - unfortunately extremely noncompliant.  Per nursing staff frequently asking for various snacks throughout the day  Given difficulty in managing glucose will consult endocrinology 2/6  Lantus increased to 52 units qhs  Humalog increased to 23 units 3 times daily with meals  Frequent Accu-Cheks and hypoglycemia protocol

## 2024-02-07 NOTE — QUICK NOTE
Vital signs and lab work reviewed. Patient's potassium has improved today up to 4.0. Unfortunately her weight has also gone up 3 pounds. I went and spoke with Ms. Joy this morning. She currently has a total of 16 pills that she is taking every day. She has a long history of difficulty with compliance including diet and fluid adherence. She could not tell me the name of her diuretic or what dose she is on, nor the proposed dosing of metolazone with extra potassium pills    At this point, I think our best strategy may be to simplify her medical regimen is much as possible. She was previously on 4 mg twice a day of Bumex so we are going to increase this to 5 twice daily. Spironolactone has been added to help with her hypokalemia. We will back off on the dosing of her potassium pills. I do have serious doubt on her ability to take metolazone intermittently throughout the week with extra potassium pills. We will discontinue this and try to stabilize her on Bumex alone. On discharge the plan will be to have her follow-up in the office in 3 to 4 days and then once weekly for the next month. If need be we could prescribe one-time doses of metolazone as we evaluate her in the outpatient setting.    Increase Bumex to 5 twice daily  Decrease potassium to 40 daily  Continue spironolactone 25 daily (new)  Hold metolazone  Continue Toprol  Continue Eliquis  Daily BMP and standing weights  Anticipate keeping her in the hospital at least 48 to 72 hours until we can ensure that she is stable on this regiment. She is high risk for heart failure readmission. She is agreeable to this plan.

## 2024-02-07 NOTE — PLAN OF CARE
Problem: DISCHARGE PLANNING  Goal: Discharge to home or other facility with appropriate resources  Description: INTERVENTIONS:  - Identify barriers to discharge w/patient and caregiver  - Arrange for needed discharge resources and transportation as appropriate  - Identify discharge learning needs (meds, wound care, etc.)  - Arrange for interpretive services to assist at discharge as needed  - Refer to Case Management Department for coordinating discharge planning if the patient needs post-hospital services based on physician/advanced practitioner order or complex needs related to functional status, cognitive ability, or social support system  Outcome: Progressing     Problem: Knowledge Deficit  Goal: Patient/family/caregiver demonstrates understanding of disease process, treatment plan, medications, and discharge instructions  Description: Complete learning assessment and assess knowledge base.  Interventions:  - Provide teaching at level of understanding  - Provide teaching via preferred learning methods  Outcome: Progressing     Problem: CARDIOVASCULAR - ADULT  Goal: Maintains optimal cardiac output and hemodynamic stability  Description: INTERVENTIONS:  - Monitor I/O, vital signs and rhythm  - Monitor for S/S and trends of decreased cardiac output  - Administer and titrate ordered vasoactive medications to optimize hemodynamic stability  - Assess quality of pulses, skin color and temperature  - Assess for signs of decreased coronary artery perfusion  - Instruct patient to report change in severity of symptoms  Outcome: Progressing  Goal: Absence of cardiac dysrhythmias or at baseline rhythm  Description: INTERVENTIONS:  - Continuous cardiac monitoring, vital signs, obtain 12 lead EKG if ordered  - Administer antiarrhythmic and heart rate control medications as ordered  - Monitor electrolytes and administer replacement therapy as ordered  Outcome: Progressing     Problem: RESPIRATORY - ADULT  Goal: Achieves  optimal ventilation and oxygenation  Description: INTERVENTIONS:  - Assess for changes in respiratory status  - Assess for changes in mentation and behavior  - Position to facilitate oxygenation and minimize respiratory effort  - Oxygen administered by appropriate delivery if ordered  - Initiate smoking cessation education as indicated  - Encourage broncho-pulmonary hygiene including cough, deep breathe, Incentive Spirometry  - Assess the need for suctioning and aspirate as needed  - Assess and instruct to report SOB or any respiratory difficulty  - Respiratory Therapy support as indicated  Outcome: Progressing     Problem: METABOLIC, FLUID AND ELECTROLYTES - ADULT  Goal: Electrolytes maintained within normal limits  Description: INTERVENTIONS:  - Monitor labs and assess patient for signs and symptoms of electrolyte imbalances  - Administer electrolyte replacement as ordered  - Monitor response to electrolyte replacements, including repeat lab results as appropriate  - Instruct patient on fluid and nutrition as appropriate  Outcome: Progressing  Goal: Fluid balance maintained  Description: INTERVENTIONS:  - Monitor labs   - Monitor I/O and WT  - Instruct patient on fluid and nutrition as appropriate  - Assess for signs & symptoms of volume excess or deficit  Outcome: Progressing  Goal: Glucose maintained within target range  Description: INTERVENTIONS:  - Monitor Blood Glucose as ordered  - Assess for signs and symptoms of hyperglycemia and hypoglycemia  - Administer ordered medications to maintain glucose within target range  - Assess nutritional intake and initiate nutrition service referral as needed  Outcome: Progressing     Problem: Prexisting or High Potential for Compromised Skin Integrity  Goal: Skin integrity is maintained or improved  Description: INTERVENTIONS:  - Identify patients at risk for skin breakdown  - Assess and monitor skin integrity  - Assess and monitor nutrition and hydration status  -  Monitor labs   - Assess for incontinence   - Turn and reposition patient  - Assist with mobility/ambulation  - Relieve pressure over bony prominences  - Avoid friction and shearing  - Provide appropriate hygiene as needed including keeping skin clean and dry  - Evaluate need for skin moisturizer/barrier cream  - Collaborate with interdisciplinary team   - Patient/family teaching  - Consider wound care consult   Outcome: Progressing     Problem: Nutrition/Hydration-ADULT  Goal: Nutrient/Hydration intake appropriate for improving, restoring or maintaining nutritional needs  Description: Monitor and assess patient's nutrition/hydration status for malnutrition. Collaborate with interdisciplinary team and initiate plan and interventions as ordered.  Monitor patient's weight and dietary intake as ordered or per policy. Utilize nutrition screening tool and intervene as necessary. Determine patient's food preferences and provide high-protein, high-caloric foods as appropriate.     INTERVENTIONS:  - Monitor oral intake, urinary output, labs, and treatment plans  - Assess nutrition and hydration status and recommend course of action  - Evaluate amount of meals eaten  - Assist patient with eating if necessary   - Allow adequate time for meals  - Recommend/ encourage appropriate diets, oral nutritional supplements, and vitamin/mineral supplements  - Order, calculate, and assess calorie counts as needed  - Recommend, monitor, and adjust tube feedings and TPN/PPN based on assessed needs  - Assess need for intravenous fluids  - Provide specific nutrition/hydration education as appropriate  - Include patient/family/caregiver in decisions related to nutrition  Outcome: Progressing

## 2024-02-08 PROBLEM — N17.9 AKI (ACUTE KIDNEY INJURY) (HCC): Status: RESOLVED | Noted: 2024-01-22 | Resolved: 2024-02-08

## 2024-02-08 LAB
ANION GAP SERPL CALCULATED.3IONS-SCNC: 5 MMOL/L
BUN SERPL-MCNC: 24 MG/DL (ref 5–25)
CALCIUM SERPL-MCNC: 8.6 MG/DL (ref 8.4–10.2)
CHLORIDE SERPL-SCNC: 90 MMOL/L (ref 96–108)
CO2 SERPL-SCNC: 38 MMOL/L (ref 21–32)
CREAT SERPL-MCNC: 0.59 MG/DL (ref 0.6–1.3)
GFR SERPL CREATININE-BSD FRML MDRD: 96 ML/MIN/1.73SQ M
GLUCOSE SERPL-MCNC: 141 MG/DL (ref 65–140)
GLUCOSE SERPL-MCNC: 172 MG/DL (ref 65–140)
GLUCOSE SERPL-MCNC: 177 MG/DL (ref 65–140)
GLUCOSE SERPL-MCNC: 225 MG/DL (ref 65–140)
GLUCOSE SERPL-MCNC: 232 MG/DL (ref 65–140)
POTASSIUM SERPL-SCNC: 4.2 MMOL/L (ref 3.5–5.3)
SODIUM SERPL-SCNC: 133 MMOL/L (ref 135–147)

## 2024-02-08 PROCEDURE — 94760 N-INVAS EAR/PLS OXIMETRY 1: CPT

## 2024-02-08 PROCEDURE — 94660 CPAP INITIATION&MGMT: CPT

## 2024-02-08 PROCEDURE — 99232 SBSQ HOSP IP/OBS MODERATE 35: CPT | Performed by: STUDENT IN AN ORGANIZED HEALTH CARE EDUCATION/TRAINING PROGRAM

## 2024-02-08 PROCEDURE — 82948 REAGENT STRIP/BLOOD GLUCOSE: CPT

## 2024-02-08 PROCEDURE — 99232 SBSQ HOSP IP/OBS MODERATE 35: CPT | Performed by: INTERNAL MEDICINE

## 2024-02-08 PROCEDURE — 94640 AIRWAY INHALATION TREATMENT: CPT

## 2024-02-08 PROCEDURE — 80048 BASIC METABOLIC PNL TOTAL CA: CPT | Performed by: INTERNAL MEDICINE

## 2024-02-08 RX ORDER — INSULIN LISPRO 100 [IU]/ML
4-20 INJECTION, SOLUTION INTRAVENOUS; SUBCUTANEOUS
Status: DISCONTINUED | OUTPATIENT
Start: 2024-02-08 | End: 2024-02-09 | Stop reason: HOSPADM

## 2024-02-08 RX ORDER — INSULIN LISPRO 100 [IU]/ML
25 INJECTION, SOLUTION INTRAVENOUS; SUBCUTANEOUS
Status: DISCONTINUED | OUTPATIENT
Start: 2024-02-08 | End: 2024-02-09 | Stop reason: HOSPADM

## 2024-02-08 RX ORDER — INSULIN GLARGINE 100 [IU]/ML
55 INJECTION, SOLUTION SUBCUTANEOUS
Status: DISCONTINUED | OUTPATIENT
Start: 2024-02-08 | End: 2024-02-09

## 2024-02-08 RX ADMIN — MONTELUKAST 10 MG: 10 TABLET, FILM COATED ORAL at 22:12

## 2024-02-08 RX ADMIN — INSULIN LISPRO 23 UNITS: 100 INJECTION, SOLUTION INTRAVENOUS; SUBCUTANEOUS at 09:11

## 2024-02-08 RX ADMIN — ATORVASTATIN CALCIUM 40 MG: 40 TABLET, FILM COATED ORAL at 17:15

## 2024-02-08 RX ADMIN — INSULIN LISPRO 1 UNITS: 100 INJECTION, SOLUTION INTRAVENOUS; SUBCUTANEOUS at 09:11

## 2024-02-08 RX ADMIN — NYSTATIN: 100000 POWDER TOPICAL at 09:12

## 2024-02-08 RX ADMIN — INSULIN LISPRO 25 UNITS: 100 INJECTION, SOLUTION INTRAVENOUS; SUBCUTANEOUS at 17:14

## 2024-02-08 RX ADMIN — BUMETANIDE 5 MG: 1 TABLET ORAL at 09:10

## 2024-02-08 RX ADMIN — DOCUSATE SODIUM 100 MG: 100 CAPSULE, LIQUID FILLED ORAL at 17:15

## 2024-02-08 RX ADMIN — ESCITALOPRAM OXALATE 20 MG: 20 TABLET ORAL at 09:10

## 2024-02-08 RX ADMIN — APIXABAN 5 MG: 5 TABLET, FILM COATED ORAL at 09:09

## 2024-02-08 RX ADMIN — METOPROLOL SUCCINATE 100 MG: 50 TABLET, EXTENDED RELEASE ORAL at 09:10

## 2024-02-08 RX ADMIN — INSULIN GLARGINE 55 UNITS: 100 INJECTION, SOLUTION SUBCUTANEOUS at 22:13

## 2024-02-08 RX ADMIN — LORATADINE 10 MG: 10 TABLET ORAL at 09:09

## 2024-02-08 RX ADMIN — SPIRONOLACTONE 25 MG: 25 TABLET ORAL at 09:10

## 2024-02-08 RX ADMIN — POTASSIUM CHLORIDE 40 MEQ: 1500 TABLET, EXTENDED RELEASE ORAL at 09:10

## 2024-02-08 RX ADMIN — BUDESONIDE 0.5 MG: 0.5 INHALANT ORAL at 19:38

## 2024-02-08 RX ADMIN — BUDESONIDE 0.5 MG: 0.5 INHALANT ORAL at 07:17

## 2024-02-08 RX ADMIN — PANTOPRAZOLE SODIUM 40 MG: 40 TABLET, DELAYED RELEASE ORAL at 06:04

## 2024-02-08 RX ADMIN — APIXABAN 5 MG: 5 TABLET, FILM COATED ORAL at 17:15

## 2024-02-08 RX ADMIN — TRAZODONE HYDROCHLORIDE 150 MG: 100 TABLET ORAL at 22:12

## 2024-02-08 RX ADMIN — INSULIN LISPRO 23 UNITS: 100 INJECTION, SOLUTION INTRAVENOUS; SUBCUTANEOUS at 13:00

## 2024-02-08 RX ADMIN — INSULIN LISPRO 8 UNITS: 100 INJECTION, SOLUTION INTRAVENOUS; SUBCUTANEOUS at 17:15

## 2024-02-08 RX ADMIN — EMPAGLIFLOZIN 10 MG: 10 TABLET, FILM COATED ORAL at 09:10

## 2024-02-08 RX ADMIN — BUMETANIDE 5 MG: 1 TABLET ORAL at 17:14

## 2024-02-08 RX ADMIN — NYSTATIN 1 APPLICATION: 100000 POWDER TOPICAL at 17:14

## 2024-02-08 RX ADMIN — INSULIN LISPRO 2 UNITS: 100 INJECTION, SOLUTION INTRAVENOUS; SUBCUTANEOUS at 13:00

## 2024-02-08 RX ADMIN — DOCUSATE SODIUM 100 MG: 100 CAPSULE, LIQUID FILLED ORAL at 09:12

## 2024-02-08 NOTE — ASSESSMENT & PLAN NOTE
Baseline oxygen requirement 4 L continuously  Underlying severe COPD, severe pulmonary hypertension and diastolic heart failure  BNP-174  COVID/respiratory panel-negative  Chest x-ray consistent with pulmonary venous congestion,     Appreciate Cardiology and Pulmonology recommendations  Not on steroids now  Volume status optimized now by bumex 5 mg BID, aldactone 25 daily  Respiratory protocol  Monitor volume status  Strict I's and O's  Fluid restriction  Resolved - weaned to baseline of 4 L nasal cannula

## 2024-02-08 NOTE — ASSESSMENT & PLAN NOTE
Recent Labs     02/06/24  0530 02/07/24  0336 02/08/24  0455   CREATININE 0.71 0.73 0.59*   EGFR 89 [phone]     Estimated Creatinine Clearance: 121 mL/min (A) (by C-G formula based on SCr of 0.59 mg/dL (L)).     Baseline creatinine-0.5-0.7  Etiology-prerenal azotemia in setting of volume overload/acute diastolic heart failure  Continue with IV Bumex drip  Slight bump in Cr today  Urinary retention protocol  Avoid nephrotoxic agents  Monitor BMP daily

## 2024-02-08 NOTE — ASSESSMENT & PLAN NOTE
Wt Readings from Last 3 Encounters:   02/08/24 123 kg (270 lb 4.5 oz)   11/09/23 129 kg (284 lb 12.8 oz)   09/20/23 128 kg (283 lb)     Appears volume overloaded  Worsening shortness of breath with bilateral lower extremity edema  Chest x-ray-pulmonary venous congestion  BNP-174, in obese patient  Echo 08/23-preserved EF, diastolic dysfunction, right ventricular systolic pressure 61  Home regimen-Bumex 2 mg twice daily  Increased to 5 mg bid , daily potassium supplements   Spironolactone 25 mg daily added   Hold metolazone  Fluid restriction 1500 mL  Unfortunately patient has been noncompliant with both fluid and salt restriction despite extensive education  Salt restriction  Strict I's and O's  Appreciate Cardiology input

## 2024-02-08 NOTE — PROGRESS NOTES
[facility]  Progress Note  Name: Mattie Joy I  MRN: [id number]  Unit/Bed#: -01 I Date of Admission: 1/22/2024   Date of Service: 2/8/2024 I Hospital Day: 17    Assessment/Plan   * Acute on chronic hypercapnic respiratory failure (HCC)  Assessment & Plan  Baseline oxygen requirement 4 L continuously  Underlying severe COPD, severe pulmonary hypertension and diastolic heart failure  BNP-174  COVID/respiratory panel-negative  Chest x-ray consistent with pulmonary venous congestion,     Appreciate Cardiology and Pulmonology recommendations  Not on steroids now  Volume status optimized now by bumex 5 mg BID, aldactone 25 daily  Respiratory protocol  Monitor volume status  Strict I's and O's  Fluid restriction  Resolved - weaned to baseline of 4 L nasal cannula    Acute on chronic heart failure with preserved ejection fraction (HCC)  Assessment & Plan  Wt Readings from Last 3 Encounters:   02/08/24 123 kg (270 lb 4.5 oz)   11/09/23 129 kg (284 lb 12.8 oz)   09/20/23 128 kg (283 lb)     Appears volume overloaded  Worsening shortness of breath with bilateral lower extremity edema  Chest x-ray-pulmonary venous congestion  BNP-174, in obese patient  Echo 08/23-preserved EF, diastolic dysfunction, right ventricular systolic pressure 61  Home regimen-Bumex 2 mg twice daily  Increased to 5 mg bid , daily potassium supplements   Spironolactone 25 mg daily added   Hold metolazone  Fluid restriction 1500 mL  Unfortunately patient has been noncompliant with both fluid and salt restriction despite extensive education  Salt restriction  Strict I's and O's  Appreciate Cardiology input      COPD, severe (HCC)  Assessment & Plan  Home regimen consists of benralizumab, Singulair, Pulmicort nebulization twice daily, Xopenex and albuterol as needed, Bevespi twice daily  Was seen at pulmonology office on 1/22, had respiratory distress, was given prednisone taper and urged to go to ED for  evaluation    Respiratory protocol  Continue with Pulmicort nebulization twice daily  Albuterol and Xopenex as needed  Singulair 10 mg daily  Inpatient consult to Pulmonology -no further inpatient recommendations      Eosinophilic asthma  Assessment & Plan  On benralizumab every 8 weeks  Follows with Pulmonology    Morbid obesity ([facility])  Assessment & Plan  Counseling about adaptation of healthy dietary habits and lifestyle modifications, once medically stable  BMI >50 on admission - decreasing with diuresis   Affects all aspects of care    Pulmonary hypertension ([facility])  Assessment & Plan  As per recent echo in 08/23, right ventricular systolic pressure is severely elevated at 61 mmHg  Started on bumex 5 mg BID   Continue to monitor volume status clinically    Paroxysmal atrial fibrillation ([facility])  Assessment & Plan  Rate control with Toprol- mg daily  Anticoagulated with Eliquis 5 mg twice daily    Hypokalemia  Assessment & Plan  Recent Labs     02/06/24  0530 02/07/24  0336 02/08/24  0455   K 3.2* 4.0 4.2      Continue repletion - improved to 4.0.  Will decrease potassium supplementation to daily  Continue with K Dur 40 daily   Monitor on telemetry    Type 2 diabetes mellitus with stage 2 chronic kidney disease, with long-term current use of insulin ([facility])  Assessment & Plan  Lab Results   Component Value Date    HGBA1C 8.5 (H) 01/22/2024       Recent Labs     02/07/24  1617 02/07/24  2143 02/08/24  0713 02/08/24  1127   POCGLU 205* 190* 172* 232*       Blood Sugar Average: Last 72 hrs:  (P) 219.3771402264126514  Currently uncontrolled, as per recent A1c  Home regimen consists of glargine 30 units at bedtime, insulin lispro 30 units with breakfast, 15 units with lunch and dinner, metformin 500 mg twice daily and Ozempic weekly  Continue with insulin glargine 55 units at bedtime  Increased humalog to 25 units TID   Additional coverage with sliding scale  Endocrinology following, appreciate recommendations  Carb  consistent diet - unfortunately extremely noncompliant.  Per nursing staff frequently asking for various snacks throughout the day  Frequent Accu-Cheks and hypoglycemia protocol    Hypercholesterolemia  Assessment & Plan  Continue with atorvastatin 40 mg daily at bedtime    Obstructive sleep apnea  Assessment & Plan  Uses CPAP at home  Follows with Pulmonology on outpatient, awaiting BiPAP study with transcutaneous oxygen monitoring  Continue with BiPAP while inpatient    Esophageal reflux  Assessment & Plan  Continue with Protonix    IVELISSE (acute kidney injury) (HCC)-resolved as of 2/8/2024  Assessment & Plan  Recent Labs     02/06/24  0530 02/07/24  0336 02/08/24  0455   CREATININE 0.71 0.73 0.59*   EGFR 89 86 96     Estimated Creatinine Clearance: 121 mL/min (A) (by C-G formula based on SCr of 0.59 mg/dL (L)).     Baseline creatinine-0.5-0.7  Etiology-prerenal azotemia in setting of volume overload/acute diastolic heart failure  Continue with IV Bumex drip  Slight bump in Cr today  Urinary retention protocol  Avoid nephrotoxic agents  Monitor BMP daily               VTE Pharmacologic Prophylaxis: VTE Score: 8 Moderate Risk (Score 3-4) - Pharmacological DVT Prophylaxis Ordered: apixaban (Eliquis).    Mobility:   Basic Mobility Inpatient Raw Score: 23  JH-HLM Goal: 7: Walk 25 feet or more  JH-HLM Achieved: 7: Walk 25 feet or more  HLM Goal achieved. Continue to encourage appropriate mobility.    Patient Centered Rounds:  yes    Discussions with Specialists or Other Care Team Provider: yes    Education and Discussions with Family / Patient: Patient declined call to .     Total Time Spent on Date of Encounter in care of patient: 36 mins. This time was spent on one or more of the following: performing physical exam; counseling and coordination of care; obtaining or reviewing history; documenting in the medical record; reviewing/ordering tests, medications or procedures; communicating with other healthcare  professionals and discussing with patient's family/caregivers.    Current Length of Stay: 17 day(s)  Current Patient Status: Inpatient   Certification Statement: The patient will continue to require additional inpatient hospital stay due to pending stabilization  Discharge Plan: Anticipate discharge in 24-48 hrs to home with home services.    Code Status: Level 1 - Full Code    Subjective:   Seen and examined at bedside   Doing ok  At baseline  Ros otherwise, negative    Objective:     Vitals:   Temp (24hrs), Av.8 °F (36.6 °C), Min:97.3 °F (36.3 °C), Max:98 °F (36.7 °C)    Temp:  [97.3 °F (36.3 °C)-98 °F (36.7 °C)] 98 °F (36.7 °C)  HR:  [] 119  Resp:  [20-24] 24  BP: (107-117)/(62-76) 107/64  SpO2:  [93 %-100 %] 93 %  Body mass index is 47.88 kg/m².     Input and Output Summary (last 24 hours):     Intake/Output Summary (Last 24 hours) at 2024 1514  Last data filed at 2024 1422  Gross per 24 hour   Intake 960 ml   Output 3800 ml   Net -2840 ml       Physical Exam:   Physical Exam  Vitals reviewed.   Constitutional:       Appearance: She is obese.   HENT:      Head: Atraumatic.   Eyes:      General: No scleral icterus.  Cardiovascular:      Rate and Rhythm: Normal rate and regular rhythm.      Heart sounds: Normal heart sounds.   Pulmonary:      Effort: No respiratory distress.      Comments: Diminished   Abdominal:      General: Bowel sounds are normal.   Musculoskeletal:      Cervical back: Neck supple.      Right lower leg: Edema present.      Left lower leg: Edema present.   Skin:     General: Skin is dry.      Capillary Refill: Capillary refill takes less than 2 seconds.   Neurological:      Mental Status: She is alert. Mental status is at baseline.   Psychiatric:         Mood and Affect: Mood normal.          Additional Data:     Labs:  Results from last 7 days   Lab Units 24  0530   WBC Thousand/uL 11.39*   HEMOGLOBIN g/dL 10.8*   HEMATOCRIT % 37.6   PLATELETS Thousands/uL 332   NEUTROS  PCT % 77*   LYMPHS PCT % 13*   MONOS PCT % 9   EOS PCT % 0     Results from last 7 days   Lab Units 02/08/24  0455   SODIUM mmol/L 133*   POTASSIUM mmol/L 4.2   CHLORIDE mmol/L 90*   CO2 mmol/L 38*   BUN mg/dL 24   CREATININE mg/dL 0.59*   ANION GAP mmol/L 5   CALCIUM mg/dL 8.6   GLUCOSE RANDOM mg/dL 177*         Results from last 7 days   Lab Units 02/08/24  1127 02/08/24  0713 02/07/24  2143 02/07/24  1617 02/07/24  1119 02/07/24  0714 02/06/24  2052 02/06/24  1627 02/06/24  1131 02/06/24  0705 02/05/24  2143 02/05/24  1540   POC GLUCOSE mg/dl 232* 172* 190* 205* 203* 136 217* 188* 328* 206* 246* 173*               Lines/Drains:  Invasive Devices       Peripheral Intravenous Line  Duration             Peripheral IV 02/04/24 Right;Ventral (anterior) Forearm 4 days    Peripheral IV 02/08/24 Left;Ventral (anterior) Hand <1 day                          Imaging: Reviewed radiology reports from this admission including: none    Recent Cultures (last 7 days):         Last 24 Hours Medication List:   Current Facility-Administered Medications   Medication Dose Route Frequency Provider Last Rate    acetaminophen  650 mg Oral Q6H PRN Rosamaria Miller PA-C      albuterol  2 puff Inhalation Q4H PRN aHwa Marsh MD      apixaban  5 mg Oral BID Hawa Marsh MD      atorvastatin  40 mg Oral Daily With Dinner Hawa Marsh MD      budesonide  0.5 mg Nebulization Q12H Hawa Marsh MD      bumetanide  5 mg Oral BID Chuck Harrell PA-C      docusate sodium  100 mg Oral BID Hawa Marsh MD      Empagliflozin  10 mg Oral Daily Chuck Harrell PA-C      escitalopram  20 mg Oral Daily Hawa Marsh MD      insulin glargine  55 Units Subcutaneous HS Marixa Lucas MD      insulin lispro  1-5 Units Subcutaneous HS Hawa Marsh MD      insulin lispro  25 Units Subcutaneous TID With Meals Marixa Lucas MD      insulin lispro  4-20 Units Subcutaneous TID AC Marixa Lucas MD      loratadine  10 mg Oral Daily Hawa Marsh MD      metoprolol succinate  100 mg  Oral Daily Chuck Harrell PA-C      montelukast  10 mg Oral HS Hawa Marsh MD      nystatin   Topical BID Tia Li PA-C      ondansetron  4 mg Intravenous Q4H PRN Francois Cadet PA-C      pantoprazole  40 mg Oral Daily Before Breakfast Hawa Marsh MD      potassium chloride  40 mEq Oral Daily Rupal Mercedes PA-C      spironolactone  25 mg Oral Daily Chuck Harrell PA-C      traZODone  150 mg Oral HS Hawa Marsh MD          Today, Patient Was Seen By: Hawa Marsh MD    **Please Note: This note may have been constructed using a voice recognition system.**

## 2024-02-08 NOTE — ASSESSMENT & PLAN NOTE
As per recent echo in 08/23, right ventricular systolic pressure is severely elevated at 61 mmHg  Started on bumex 5 mg BID   Continue to monitor volume status clinically

## 2024-02-08 NOTE — PROGRESS NOTES
"Progress Note - Mattie Joy 63 y.o. female MRN: [id number]    Unit/Bed#: -01 Encounter: [id number]      CC: diabetes f/u    Subjective:   Mattie Joy is a 63 y.o. year old female with type 2 diabetes.  Feels well.  No complaints.  No hypoglycemia. Reports has been eating all her meals and trying to not over eat. BG overall improved with highesh  this morning after bk. Fasting     Objective:     Vitals: Blood pressure 109/62, pulse 97, temperature 98 °F (36.7 °C), resp. rate 20, height 5' 3\" (1.6 m), weight 123 kg (270 lb 4.5 oz), SpO2 97%, not currently breastfeeding.,Body mass index is 47.88 kg/m².      Intake/Output Summary (Last 24 hours) at 2/8/2024 1437  Last data filed at 2/8/2024 1422  Gross per 24 hour   Intake 960 ml   Output 3800 ml   Net -2840 ml       Physical Exam:  General Appearance: awake, appears stated age and cooperative  Head: Normocephalic, without obvious abnormality, atraumatic  Extremities: moves all extremities  Skin: Skin color and temperature normal.   Pulm: no labored breathing    Lab, Imaging and other studies: I have personally reviewed pertinent reports.      POC Glucose (mg/dl)   Date Value   02/08/2024 232 (H)   02/08/2024 172 (H)   02/07/2024 190 (H)   02/07/2024 205 (H)   02/07/2024 203 (H)   02/07/2024 136   02/06/2024 217 (H)   02/06/2024 188 (H)   02/06/2024 328 (H)   02/06/2024 206 (H)       Assessment:  diabetes with hyperglycemia  Assessment and plan:  63 y.o.-year-old female with diabetes with hyperglycemia admitted for ARF d/t COPD/HF. Endocrine consulted for BG management. BG improved since increasing insulin dosing. Overall BG still slightly above goal with meals. For now will adjust novolog with meals slightly and very mildly adjust lantus as well. Will change to a much higher scale for better coverage with meals      Type 2 diabetes mellitus with hyperglycemia  HbA1c- 8.5% (best A1C she's had in a while)  Home regimen: Lantus 30u, Apidra 30/15/15, " ozempic 0.25mg weekly, metformin 1000mg BID  Inpatient regimen: Lantus 52u and lispro 23u with meals        Plan:  - Increase lantus to 55u   - Inc lispro to 25u with each meal with SS algorithm 5      Portions of the record may have been created with voice recognition software.

## 2024-02-08 NOTE — ASSESSMENT & PLAN NOTE
Home regimen consists of benralizumab, Singulair, Pulmicort nebulization twice daily, Xopenex and albuterol as needed, Bevespi twice daily  Was seen at pulmonology office on 1/22, had respiratory distress, was given prednisone taper and urged to go to ED for evaluation    Respiratory protocol  Continue with Pulmicort nebulization twice daily  Albuterol and Xopenex as needed  Singulair 10 mg daily  Inpatient consult to Pulmonology -no further inpatient recommendations

## 2024-02-08 NOTE — ASSESSMENT & PLAN NOTE
Recent Labs     02/06/24  0530 02/07/24  0336 02/08/24  0455   K 3.2* 4.0 4.2      Continue repletion - improved to 4.0.  Will decrease potassium supplementation to daily  Continue with K Dur 40 daily   Monitor on telemetry

## 2024-02-08 NOTE — ASSESSMENT & PLAN NOTE
Lab Results   Component Value Date    HGBA1C 8.5 (H) 01/22/2024       Recent Labs     02/07/24  1617 02/07/24  2143 02/08/24  0713 02/08/24  1127   POCGLU 205* 190* 172* 232*       Blood Sugar Average: Last 72 hrs:  (P) 219.2350904727061946  Currently uncontrolled, as per recent A1c  Home regimen consists of glargine 30 units at bedtime, insulin lispro 30 units with breakfast, 15 units with lunch and dinner, metformin 500 mg twice daily and Ozempic weekly  Continue with insulin glargine 55 units at bedtime  Increased humalog to 25 units TID   Additional coverage with sliding scale  Endocrinology following, appreciate recommendations  Carb consistent diet - unfortunately extremely noncompliant.  Per nursing staff frequently asking for various snacks throughout the day  Frequent Accu-Cheks and hypoglycemia protocol

## 2024-02-08 NOTE — PROGRESS NOTES
" Pastoral Care Progress Note    2024  Patient: Mattie Joy : 1960  Admission Date & Time: 2024 1515  MRN: [id number] [facility]: [id number]           24 1000   Clinical Encounter Type   Visited With Patient   Routine Visit Follow-up   [facility] Encounters   [facility] Needs Prayer;Sacred Text      Intern visited with pt while rounding.  Intern reminded pt of previous visit and asked about her  visiting previously. Pt stated that the visit went well and that she was \"getting there\" when asked how she was feeling. Pt expressed that she wanted to go home and was hoping to leave sometime this weekend.  Intern asked the pt if she needed anything and gave the pt a Bible.  Intern prayed with the pt before leaving and reminded the pt on how to contact chaplains if needed. Pt expressed gratitude for the visit. Spiritual care remains available.             Chaplaincy Interventions Utilized:   Empowerment: Clarified, confirmed, or reviewed information from treatment team  and Encouraged focus on present    Exploration: Identified, evaluated & reinforced appropriate coping strategies    Collaboration: Facilitated respect for spiritual/cultural practice during hospitalization    Relationship Building: Cultivated a relationship of care and support, Listened empathically, Hospitality, and Provided silent and supportive presence    Ritual: Provided prayer and Provided Anabaptist resources    Chaplaincy Outcomes Achieved:  Expressed gratitude, Identified meaningful connections, Progressed toward focus on present, and Spiritual resources utilized    Spiritual Coping Strategies Utilized:   Spiritual practices  "

## 2024-02-09 ENCOUNTER — TRANSITIONAL CARE MANAGEMENT (OUTPATIENT)
Dept: FAMILY MEDICINE CLINIC | Facility: HOSPITAL | Age: 64
End: 2024-02-09

## 2024-02-09 VITALS
WEIGHT: 270.28 LBS | HEART RATE: 112 BPM | DIASTOLIC BLOOD PRESSURE: 75 MMHG | RESPIRATION RATE: 20 BRPM | BODY MASS INDEX: 47.89 KG/M2 | OXYGEN SATURATION: 95 % | TEMPERATURE: 97.9 F | HEIGHT: 63 IN | SYSTOLIC BLOOD PRESSURE: 124 MMHG

## 2024-02-09 DIAGNOSIS — K21.9 GASTROESOPHAGEAL REFLUX DISEASE WITHOUT ESOPHAGITIS: ICD-10-CM

## 2024-02-09 PROBLEM — J96.12 CHRONIC HYPERCAPNIC RESPIRATORY FAILURE (HCC): Status: RESOLVED | Noted: 2020-11-12 | Resolved: 2024-02-09

## 2024-02-09 LAB
ANION GAP SERPL CALCULATED.3IONS-SCNC: 5 MMOL/L
BASOPHILS # BLD AUTO: 0.02 THOUSANDS/ÂΜL (ref 0–0.1)
BASOPHILS NFR BLD AUTO: 0 % (ref 0–1)
BUN SERPL-MCNC: 22 MG/DL (ref 5–25)
CALCIUM SERPL-MCNC: 9 MG/DL (ref 8.4–10.2)
CHLORIDE SERPL-SCNC: 90 MMOL/L (ref 96–108)
CO2 SERPL-SCNC: 38 MMOL/L (ref 21–32)
CREAT SERPL-MCNC: 0.7 MG/DL (ref 0.6–1.3)
EOSINOPHIL # BLD AUTO: 0 THOUSAND/ÂΜL (ref 0–0.61)
EOSINOPHIL NFR BLD AUTO: 0 % (ref 0–6)
ERYTHROCYTE [DISTWIDTH] IN BLOOD BY AUTOMATED COUNT: 19 % (ref 11.6–15.1)
GFR SERPL CREATININE-BSD FRML MDRD: 91 ML/MIN/1.73SQ M
GLUCOSE SERPL-MCNC: 203 MG/DL (ref 65–140)
GLUCOSE SERPL-MCNC: 205 MG/DL (ref 65–140)
GLUCOSE SERPL-MCNC: 208 MG/DL (ref 65–140)
HCT VFR BLD AUTO: 36.7 % (ref 34.8–46.1)
HGB BLD-MCNC: 10.3 G/DL (ref 11.5–15.4)
IMM GRANULOCYTES # BLD AUTO: 0.05 THOUSAND/UL (ref 0–0.2)
IMM GRANULOCYTES NFR BLD AUTO: 0 % (ref 0–2)
LYMPHOCYTES # BLD AUTO: 1.64 THOUSANDS/ÂΜL (ref 0.6–4.47)
LYMPHOCYTES NFR BLD AUTO: 15 % (ref 14–44)
MAGNESIUM SERPL-MCNC: 2.5 MG/DL (ref 1.9–2.7)
MCH RBC QN AUTO: 19.5 PG (ref 26.8–34.3)
MCHC RBC AUTO-ENTMCNC: 28.1 G/DL (ref 31.4–37.4)
MCV RBC AUTO: 69 FL (ref 82–98)
MONOCYTES # BLD AUTO: 0.91 THOUSAND/ÂΜL (ref 0.17–1.22)
MONOCYTES NFR BLD AUTO: 8 % (ref 4–12)
NEUTROPHILS # BLD AUTO: 8.61 THOUSANDS/ÂΜL (ref 1.85–7.62)
NEUTS SEG NFR BLD AUTO: 77 % (ref 43–75)
NRBC BLD AUTO-RTO: 0 /100 WBCS
PLATELET # BLD AUTO: 301 THOUSANDS/UL (ref 149–390)
PMV BLD AUTO: 11.7 FL (ref 8.9–12.7)
POTASSIUM SERPL-SCNC: 3.9 MMOL/L (ref 3.5–5.3)
RBC # BLD AUTO: 5.29 MILLION/UL (ref 3.81–5.12)
SODIUM SERPL-SCNC: 133 MMOL/L (ref 135–147)
WBC # BLD AUTO: 11.23 THOUSAND/UL (ref 4.31–10.16)

## 2024-02-09 PROCEDURE — 83735 ASSAY OF MAGNESIUM: CPT | Performed by: INTERNAL MEDICINE

## 2024-02-09 PROCEDURE — 85025 COMPLETE CBC W/AUTO DIFF WBC: CPT | Performed by: INTERNAL MEDICINE

## 2024-02-09 PROCEDURE — 80048 BASIC METABOLIC PNL TOTAL CA: CPT | Performed by: INTERNAL MEDICINE

## 2024-02-09 PROCEDURE — 94760 N-INVAS EAR/PLS OXIMETRY 1: CPT

## 2024-02-09 PROCEDURE — 94640 AIRWAY INHALATION TREATMENT: CPT

## 2024-02-09 PROCEDURE — 99239 HOSP IP/OBS DSCHRG MGMT >30: CPT | Performed by: INTERNAL MEDICINE

## 2024-02-09 PROCEDURE — 94660 CPAP INITIATION&MGMT: CPT

## 2024-02-09 PROCEDURE — 82948 REAGENT STRIP/BLOOD GLUCOSE: CPT

## 2024-02-09 RX ORDER — INSULIN GLARGINE 100 [IU]/ML
58 INJECTION, SOLUTION SUBCUTANEOUS
Status: DISCONTINUED | OUTPATIENT
Start: 2024-02-09 | End: 2024-02-09 | Stop reason: HOSPADM

## 2024-02-09 RX ORDER — OMEPRAZOLE 40 MG/1
40 CAPSULE, DELAYED RELEASE ORAL DAILY
Qty: 90 CAPSULE | Refills: 3 | Status: SHIPPED | OUTPATIENT
Start: 2024-02-09

## 2024-02-09 RX ORDER — LORATADINE 10 MG/1
10 TABLET ORAL DAILY PRN
Qty: 30 TABLET | Refills: 0 | Status: SHIPPED | OUTPATIENT
Start: 2024-02-09

## 2024-02-09 RX ORDER — BUMETANIDE 2 MG/1
5 TABLET ORAL 2 TIMES DAILY
Qty: 120 TABLET | Refills: 0 | Status: SHIPPED | OUTPATIENT
Start: 2024-02-09

## 2024-02-09 RX ORDER — NYSTATIN 100000 [USP'U]/G
POWDER TOPICAL 2 TIMES DAILY
Qty: 30 G | Refills: 0 | Status: SHIPPED | OUTPATIENT
Start: 2024-02-09

## 2024-02-09 RX ORDER — INSULIN GLULISINE 100 [IU]/ML
25 INJECTION, SOLUTION SUBCUTANEOUS
Qty: 15 ML | Refills: 0 | Status: SHIPPED | OUTPATIENT
Start: 2024-02-09

## 2024-02-09 RX ORDER — SPIRONOLACTONE 25 MG/1
25 TABLET ORAL DAILY
Qty: 60 TABLET | Refills: 0 | Status: SHIPPED | OUTPATIENT
Start: 2024-02-10

## 2024-02-09 RX ORDER — INSULIN GLARGINE 100 [IU]/ML
55 INJECTION, SOLUTION SUBCUTANEOUS
Qty: 15 ML | Refills: 0 | Status: SHIPPED | OUTPATIENT
Start: 2024-02-09 | End: 2024-02-09

## 2024-02-09 RX ORDER — INSULIN GLARGINE 100 [IU]/ML
58 INJECTION, SOLUTION SUBCUTANEOUS
Qty: 15 ML | Refills: 0 | Status: SHIPPED | OUTPATIENT
Start: 2024-02-09

## 2024-02-09 RX ADMIN — DOCUSATE SODIUM 100 MG: 100 CAPSULE, LIQUID FILLED ORAL at 08:40

## 2024-02-09 RX ADMIN — INSULIN LISPRO 4 UNITS: 100 INJECTION, SOLUTION INTRAVENOUS; SUBCUTANEOUS at 08:43

## 2024-02-09 RX ADMIN — INSULIN LISPRO 25 UNITS: 100 INJECTION, SOLUTION INTRAVENOUS; SUBCUTANEOUS at 08:41

## 2024-02-09 RX ADMIN — INSULIN LISPRO 4 UNITS: 100 INJECTION, SOLUTION INTRAVENOUS; SUBCUTANEOUS at 12:31

## 2024-02-09 RX ADMIN — PANTOPRAZOLE SODIUM 40 MG: 40 TABLET, DELAYED RELEASE ORAL at 06:50

## 2024-02-09 RX ADMIN — POTASSIUM CHLORIDE 40 MEQ: 1500 TABLET, EXTENDED RELEASE ORAL at 08:41

## 2024-02-09 RX ADMIN — NYSTATIN: 100000 POWDER TOPICAL at 08:45

## 2024-02-09 RX ADMIN — LORATADINE 10 MG: 10 TABLET ORAL at 08:41

## 2024-02-09 RX ADMIN — INSULIN LISPRO 25 UNITS: 100 INJECTION, SOLUTION INTRAVENOUS; SUBCUTANEOUS at 12:32

## 2024-02-09 RX ADMIN — BUMETANIDE 5 MG: 1 TABLET ORAL at 08:40

## 2024-02-09 RX ADMIN — ESCITALOPRAM OXALATE 20 MG: 20 TABLET ORAL at 08:40

## 2024-02-09 RX ADMIN — APIXABAN 5 MG: 5 TABLET, FILM COATED ORAL at 08:41

## 2024-02-09 RX ADMIN — BUDESONIDE 0.5 MG: 0.5 INHALANT ORAL at 07:09

## 2024-02-09 RX ADMIN — EMPAGLIFLOZIN 10 MG: 10 TABLET, FILM COATED ORAL at 08:40

## 2024-02-09 RX ADMIN — SPIRONOLACTONE 25 MG: 25 TABLET ORAL at 08:40

## 2024-02-09 RX ADMIN — METOPROLOL SUCCINATE 100 MG: 50 TABLET, EXTENDED RELEASE ORAL at 08:41

## 2024-02-09 NOTE — ASSESSMENT & PLAN NOTE
As per recent echo in 08/23, right ventricular systolic pressure is severely elevated at 61 mmHg  Started on bumex 5 mg BID

## 2024-02-09 NOTE — DISCHARGE SUMMARY
[facility]  Discharge- Mattie Joy 1960, 63 y.o. female MRN: [id number]  Unit/Bed#: -01 Encounter: [id number]  Primary Care Provider: Laith Olivares MD   Date and time admitted to hospital: 1/22/2024  3:15 PM    * Acute on chronic hypercapnic respiratory failure (HCC)-resolved as of 2/9/2024  Assessment & Plan  Baseline oxygen requirement 4 L continuously  Underlying severe COPD, severe pulmonary hypertension and diastolic heart failure  BNP-174  COVID/respiratory panel-negative  Chest x-ray consistent with pulmonary venous congestion,     Appreciate Cardiology and Pulmonology recommendations  Not on steroids now  Volume status optimized now by bumex 5 mg BID, aldactone 25 daily  Respiratory protocol  Monitor volume status  Strict I's and O's  Fluid restriction  Resolved - weaned to baseline of 4 L nasal cannula    Acute on chronic heart failure with preserved ejection fraction (HCC)  Assessment & Plan  Wt Readings from Last 3 Encounters:   02/08/24 123 kg (270 lb 4.5 oz)   11/09/23 129 kg (284 lb 12.8 oz)   09/20/23 128 kg (283 lb)     Appears volume overloaded  Worsening shortness of breath with bilateral lower extremity edema  Chest x-ray-pulmonary venous congestion  BNP-174, in obese patient  Echo 08/23-preserved EF, diastolic dysfunction, right ventricular systolic pressure 61  Home regimen-Bumex 2 mg twice daily  Increased to 5 mg bid , daily potassium supplements   Spironolactone 25 mg daily added   Continue with Jardiance  Hold metolazone  Fluid restriction 1500 mL  Unfortunately patient has been noncompliant with both fluid and salt restriction despite extensive education  Salt restriction  Outpatient cardiology follow-up      COPD, severe (HCC)  Assessment & Plan  Home regimen consists of benralizumab, Singulair, Pulmicort nebulization twice daily, Xopenex and albuterol as needed, Bevespi twice daily  Was seen at pulmonology office on 1/22, had respiratory  distress, was given prednisone taper and urged to go to ED for evaluation    Respiratory protocol  Continue with Pulmicort nebulization twice daily  Albuterol and Xopenex as needed  Singulair 10 mg daily  Inpatient consult to Pulmonology -no further inpatient recommendations      Eosinophilic asthma  Assessment & Plan  On benralizumab every 8 weeks  Follows with Pulmonology    Morbid obesity ([facility])  Assessment & Plan  Counseling about adaptation of healthy dietary habits and lifestyle modifications, once medically stable  BMI >50 on admission - decreasing with diuresis   Affects all aspects of care    Pulmonary hypertension ([facility])  Assessment & Plan  As per recent echo in 08/23, right ventricular systolic pressure is severely elevated at 61 mmHg  Started on bumex 5 mg BID       Paroxysmal atrial fibrillation ([facility])  Assessment & Plan  Rate control with Toprol- mg daily  Anticoagulated with Eliquis 5 mg twice daily    Hypokalemia  Assessment & Plan  Recent Labs     02/07/24  0336 02/08/24  0455 02/09/24  0445   K 4.0 4.2 3.9   MG  --   --  2.5      Continue repletion - improved to 4.0.  Will decrease potassium supplementation to daily  Continue daily with potassium supplements at discharge    Type 2 diabetes mellitus with stage 2 chronic kidney disease, with long-term current use of insulin ([facility])  Assessment & Plan  Lab Results   Component Value Date    HGBA1C 8.5 (H) 01/22/2024       Recent Labs     02/08/24  1547 02/08/24  2212 02/09/24  0742 02/09/24  1110   POCGLU 225* 141* 203* 205*       Blood Sugar Average: Last 72 hrs:  (P) [id number]  Currently uncontrolled, as per recent A1c  Home regimen consists of glargine 30 units at bedtime, insulin lispro 30 units with breakfast, 15 units with lunch and dinner, metformin 500 mg twice daily and Ozempic weekly  Continue with insulin glargine 58 units at bedtime  Increased humalog to 25 units TID   Outpatient endocrinology follow-up  Continue with  empagliflozin  Discontinue metformin and Ozempic on discharge  Carb consistent diet - unfortunately extremely noncompliant.      Hypercholesterolemia  Assessment & Plan  Continue with atorvastatin 40 mg daily at bedtime    Obstructive sleep apnea  Assessment & Plan  Uses CPAP at home  Follows with Pulmonology on outpatient, awaiting BiPAP study with transcutaneous oxygen monitoring  Continue with BiPAP while inpatient    Esophageal reflux  Assessment & Plan  Continue with Protonix              Medical Problems       Resolved Problems  Date Reviewed: 2/9/2024            Resolved    * (Principal) Acute on chronic hypercapnic respiratory failure (HCC) 2/9/2024     Resolved by  Hawa Marsh MD    IVELISSE (acute kidney injury) (HCC) 2/8/2024     Resolved by  Hawa Marsh MD          Admission Date:   Admission Orders (From admission, onward)       Ordered        01/22/24 1634  INPATIENT ADMISSION  Once                            Admitting Diagnosis: Atrial fibrillation (HCC) [I48.91]  Acute on chronic diastolic heart failure (HCC) [I50.33]  Hypokalemia [E87.6]  SOB (shortness of breath) [R06.02]  COPD with acute exacerbation (HCC) [J44.1]  Type 2 diabetes mellitus (HCC) [E11.9]    HPI: as per, provider, on 1/22 Mattie Joy is a 63 y.o. female with a PMH severe COPD, chronic hypoxic/hypercapnic respiratory failure on 4 L of oxygen at baseline, paroxysmal atrial fibrillation, chronic diastolic heart failure, severe pulmonary hypertension, ambulatory dysfunction, morbid obesity, eosinophilic asthma presented with worsening shortness of breath with increasing hypoxia.  Patient was seen at pulmonology office today, and there was concern of mild acute COPD exacerbation with overlapping acute on chronic diastolic heart failure.  She was recommended to go to ED for further workup and management.     Procedures Performed:   Orders Placed This Encounter   Procedures    Critical Care    ED ECG Documentation Only       Summary of  Hospital Course:     Patient had a prolonged inpatient course, secondary to acute on chronic hypoxic respiratory failure in the setting of acute on chronic diastolic heart failure because of her noncompliance with diet and medication regimen.  She was closely followed by cardiology.  On the day of discharge, she is awake alert and oriented.  She is back on her baseline oxygen of 4 L.  Her diuretic regimen has been optimized as Bumex 5 mg twice daily, Aldactone 25 mg daily, daily empagliflozin and fluid restriction of 1500 mL.  She will have outpatient cardiology follow-up.  Her hyperglycemia in the setting of type 2 diabetes mellitus, was managed by endocrinology.  Her insulin regimen has been optimized to Lantus 58 units daily at bedtime, mealtime insulin 25 units 3 times daily AC.    She needs outpatient pulmonology, cardiology and endocrinology follow-up.    Discharge plan was discussed in detail with patient's brother Abhijit over phone at length.  All questions were answered to satisfaction.    Significant Findings, Care, Treatment and Services Provided: see above     Complications: none    Condition at Discharge: fair         Discharge instructions/Information to patient and family:   See after visit summary for information provided to patient and family.      Provisions for Follow-Up Care:  See after visit summary for information related to follow-up care and any pertinent home health orders.      PCP: Laith Olivares MD    Disposition: Home with [facility]     Planned Readmission: No    Discharge Statement   I spent 36 minutes discharging the patient. This time was spent on the day of discharge. I had direct contact with the patient on the day of discharge. Additional documentation is required if more than 30 minutes were spent on discharge.     Discharge Medications:  See after visit summary for reconciled discharge medications provided to patient and family.

## 2024-02-09 NOTE — ASSESSMENT & PLAN NOTE
Baseline oxygen requirement 4 L continuously  Underlying severe COPD, severe pulmonary hypertension and diastolic heart failure  BNP-174  COVID/respiratory panel-negative  Chest x-ray consistent with pulmonary venous congestion,     Appreciate Cardiology and Pulmonology recommendations  Not on steroids now  Volume status optimized now by bumex 5 mg BID, aldactone 25 daily  Respiratory protocol  Monitor volume status  Strict I's and O's  Fluid restriction  Resolved - weaned to baseline of 4 L nasal cannula   Message left at [facility] advising that pt is unable to obtain Temodar as previously noted, due to high copay. There is no patient assistance program available for this medication either.   I gave my phone number to call back if they had any questions.

## 2024-02-09 NOTE — ASSESSMENT & PLAN NOTE
Wt Readings from Last 3 Encounters:   02/08/24 123 kg (270 lb 4.5 oz)   11/09/23 129 kg (284 lb 12.8 oz)   09/20/23 128 kg (283 lb)     Appears volume overloaded  Worsening shortness of breath with bilateral lower extremity edema  Chest x-ray-pulmonary venous congestion  BNP-174, in obese patient  Echo 08/23-preserved EF, diastolic dysfunction, right ventricular systolic pressure 61  Home regimen-Bumex 2 mg twice daily  Increased to 5 mg bid , daily potassium supplements   Spironolactone 25 mg daily added   Continue with Jardiance  Hold metolazone  Fluid restriction 1500 mL  Unfortunately patient has been noncompliant with both fluid and salt restriction despite extensive education  Salt restriction  Outpatient cardiology follow-up

## 2024-02-09 NOTE — PLAN OF CARE
Problem: DISCHARGE PLANNING  Goal: Discharge to home or other facility with appropriate resources  Description: INTERVENTIONS:  - Identify barriers to discharge w/patient and caregiver  - Arrange for needed discharge resources and transportation as appropriate  - Identify discharge learning needs (meds, wound care, etc.)  - Arrange for interpretive services to assist at discharge as needed  - Refer to Case Management Department for coordinating discharge planning if the patient needs post-hospital services based on physician/advanced practitioner order or complex needs related to functional status, cognitive ability, or social support system  Outcome: Progressing     Problem: Knowledge Deficit  Goal: Patient/family/caregiver demonstrates understanding of disease process, treatment plan, medications, and discharge instructions  Description: Complete learning assessment and assess knowledge base.  Interventions:  - Provide teaching at level of understanding  - Provide teaching via preferred learning methods  Outcome: Progressing     Problem: CARDIOVASCULAR - ADULT  Goal: Maintains optimal cardiac output and hemodynamic stability  Description: INTERVENTIONS:  - Monitor I/O, vital signs and rhythm  - Monitor for S/S and trends of decreased cardiac output  - Administer and titrate ordered vasoactive medications to optimize hemodynamic stability  - Assess quality of pulses, skin color and temperature  - Assess for signs of decreased coronary artery perfusion  - Instruct patient to report change in severity of symptoms  Outcome: Progressing  Goal: Absence of cardiac dysrhythmias or at baseline rhythm  Description: INTERVENTIONS:  - Continuous cardiac monitoring, vital signs, obtain 12 lead EKG if ordered  - Administer antiarrhythmic and heart rate control medications as ordered  - Monitor electrolytes and administer replacement therapy as ordered  Outcome: Progressing

## 2024-02-09 NOTE — DISCHARGE INSTR - AVS FIRST PAGE
Please see your PCP within 7 days of discharge, to discuss recent hospitalization  Please continue to follow-up with pulmonology and cardiology  Your Bumex dose has been increased to 5 mg twice daily, and also Aldactone 25 mg daily has been added.  Continue to take your potassium supplements  Continue with fluid restriction 1800 mL every day  Continue to take long-acting insulin, 58 units daily at bedtime  Your mealtime insulin has been increased to 25 units 3 times daily with meals  Continue to monitor your blood pressures and blood sugars at home  Please be compliant with all the medications and dietary changes.

## 2024-02-09 NOTE — CASE MANAGEMENT
Case Management Discharge Planning Note    Patient name Mattie Joy  Location /-01 MRN [id number]  : 1960 Date 2024       Current Admission Date: 2024  Current Admission Diagnosis:Acute on chronic hypercapnic respiratory failure (HCC)   Patient Active Problem List    Diagnosis Date Noted    Eosinophilic asthma 2023    COPD, severe (HCC) 2023    Hepatic steatosis 2023    Morbid obesity (HCC) 02/15/2023    Diabetic polyneuropathy associated with type 2 diabetes mellitus (HCC) 11/15/2021    Type 2 diabetes mellitus with hyperglycemia (HCC) 2021    Tubular adenoma of colon 2021    Transaminitis 2021    Gastric polyp 2021    Class 3 severe obesity in adult (HCC) 2021    Pulmonary hypertension (HCC) 2020    Acute on chronic hypercapnic respiratory failure (HCC) 2020    Insomnia 10/28/2020    Abnormal CT of the chest 2020    Lactic acidosis 2020    Acute on chronic heart failure with preserved ejection fraction (HCC) 2020    Microcytic anemia 2020    Neck pain, chronic 2019    Current moderate episode of major depressive disorder without prior episode (HCC) 2019    Paroxysmal atrial fibrillation (HCC) 2018    Severe persistent asthma 2018    Hypokalemia 2018    Abnormal computed tomography angiography (CTA) of abdomen 2018    Abnormal CT of the abdomen 2018    Iron deficiency anemia due to chronic blood loss 2018    Type 2 diabetes mellitus with stage 2 chronic kidney disease, with long-term current use of insulin (HCC)     Ganglion cyst of flexor tendon sheath 2017    Paresthesia of upper extremity 2017    Cervical radiculopathy 2017    Elevated liver enzymes 2015    Sexual dysfunction 2014    Chronic low back pain 10/15/2014    Hypercholesterolemia 2014    Lumbar radiculopathy 2014    Esophageal reflux 2014     Hypertensive heart and chronic kidney disease with heart failure and stage 1 through stage 4 chronic kidney disease, or chronic kidney disease (HCC) 03/24/2014    Obstructive sleep apnea 03/09/2014      LOS (days): 18  Geometric Mean LOS (GMLOS) (days): 3.9  Days to GMLOS:-13.9     OBJECTIVE:  Risk of Unplanned Readmission Score: 39.86         Current admission status: Inpatient   Preferred Pharmacy:   [facility]- [place], [place] - [facility] [address]  [address] [address]  Phone: [phone] Fax: [phone]    [facility] - [place], [place] - [address]  [address]  [address]  [place] [address]  Phone: [phone] Fax: [phone]    Primary Care Provider: Laith Olivares MD    Primary Insurance: MEDICARE  Secondary Insurance: [employer]    DISCHARGE DETAILS:    IMM Given (Date):: 02/09/24  IMM Given to:: Patient  IMM reviewed with patient, patient agrees with discharge determination.       Additional Comments: Pt reports plan to return home today with [facility]. Pt reports her brother will transport home. Faxed discharge instructions to [facility].

## 2024-02-09 NOTE — ASSESSMENT & PLAN NOTE
Lab Results   Component Value Date    HGBA1C 8.5 (H) 01/22/2024       Recent Labs     02/08/24  1547 02/08/24  2212 02/09/24  0742 02/09/24  1110   POCGLU 225* 141* 203* 205*       Blood Sugar Average: Last 72 hrs:  (P) [id number]  Currently uncontrolled, as per recent A1c  Home regimen consists of glargine 30 units at bedtime, insulin lispro 30 units with breakfast, 15 units with lunch and dinner, metformin 500 mg twice daily and Ozempic weekly  Continue with insulin glargine 58 units at bedtime  Increased humalog to 25 units TID   Outpatient endocrinology follow-up  Continue with empagliflozin  Discontinue metformin and Ozempic on discharge  Carb consistent diet - unfortunately extremely noncompliant.

## 2024-02-09 NOTE — ASSESSMENT & PLAN NOTE
Recent Labs     02/07/24  0336 02/08/24  0455 02/09/24  0445   K 4.0 4.2 3.9   MG  --   --  2.5      Continue repletion - improved to 4.0.  Will decrease potassium supplementation to daily  Continue daily with potassium supplements at discharge

## 2024-02-11 ENCOUNTER — DOCUMENTATION (OUTPATIENT)
Dept: CARDIOLOGY CLINIC | Facility: CLINIC | Age: 64
End: 2024-02-11

## 2024-02-11 ENCOUNTER — TELEPHONE (OUTPATIENT)
Dept: OTHER | Facility: OTHER | Age: 64
End: 2024-02-11

## 2024-02-11 NOTE — TELEPHONE ENCOUNTER
Nurse called in stating pt is non compliant with meds. will like a call back.    On call paged via TC

## 2024-02-11 NOTE — PROGRESS NOTES
Paged from Home RN over concerns with compliance    Patient had intake of >3L fluids by lunch today. She reported to home RN two liters of water and 4 cans of soda. Her weight is already up to 280 lbs from 270 lbs at discharge 2/9/24. Patient did report to RN she is taking bumetanide as instructed. Per home RN, patient asked home RN to leave so she could rest.     Patient has follow-up with cardiology on 2/13/24.

## 2024-02-12 ENCOUNTER — PATIENT OUTREACH (OUTPATIENT)
Dept: FAMILY MEDICINE CLINIC | Facility: HOSPITAL | Age: 64
End: 2024-02-12

## 2024-02-12 ENCOUNTER — TELEPHONE (OUTPATIENT)
Dept: FAMILY MEDICINE CLINIC | Facility: HOSPITAL | Age: 64
End: 2024-02-12

## 2024-02-12 ENCOUNTER — TELEPHONE (OUTPATIENT)
Dept: CARDIOLOGY CLINIC | Facility: CLINIC | Age: 64
End: 2024-02-12

## 2024-02-12 DIAGNOSIS — J96.21 ACUTE ON CHRONIC RESPIRATORY FAILURE WITH HYPOXIA (HCC): ICD-10-CM

## 2024-02-12 DIAGNOSIS — Z71.89 COMPLEX CARE COORDINATION: Primary | ICD-10-CM

## 2024-02-12 RX ORDER — POTASSIUM CHLORIDE 20 MEQ/1
40 TABLET, EXTENDED RELEASE ORAL DAILY
Qty: 180 TABLET | Refills: 3 | Status: SHIPPED | OUTPATIENT
Start: 2024-02-12

## 2024-02-12 RX ORDER — POTASSIUM CHLORIDE 20 MEQ/1
40 TABLET, EXTENDED RELEASE ORAL DAILY
Qty: 60 TABLET | Refills: 0 | Status: SHIPPED | OUTPATIENT
Start: 2024-02-12 | End: 2024-02-12 | Stop reason: SDUPTHER

## 2024-02-12 RX ORDER — POTASSIUM CHLORIDE 20 MEQ/1
40 TABLET, EXTENDED RELEASE ORAL DAILY
Qty: 60 TABLET | Refills: 0 | Status: CANCELLED | OUTPATIENT
Start: 2024-02-12

## 2024-02-12 NOTE — TELEPHONE ENCOUNTER
Pt scheduled for tcm     [facility] had many questions about patient  faxed the d/c summary to home care    sent a message to haroon lewis care coordinator to  reach out   do u want to see the patient sooner   thanks

## 2024-02-12 NOTE — PROGRESS NOTES
Outpatient Care Management Note:    Inbasket message received from PCP office.  Ivy was recently hospitalized for COPD and has [facility]. [facility] would like some information about patient. CM updated staff that I am not currently following patient as she stopped responding to my calls.  I did agree to attempt to outreach her and Talon MITCHELLA tomorrow.

## 2024-02-12 NOTE — TELEPHONE ENCOUNTER
Maria Fernanda, nurse from [facility], called. Pt's wt at discharge was 270 lbs on 2/9/24 . Yesterday and today it was 280.8 lbs at home..  Pt is having sob with exertion. Urinating as much as she was in the hospital, and denies any other CHF symptoms. Nurse states no obvious edema noted.    Is having difficulty with following the fluid restriction or low sodium diet.  Nurse said it does not seem like she is taking care of herself.  Not checking her BS either.    Vitals WNL - 92% on 4 L    Has F/u tomorrow in QU with you.    Taking : bumex 5 mg bid                Potassium 40 meq qd               Aldactone 25 mg qd    Did you want to make any changes or wait until she is seen tomorrow?    Please advise

## 2024-02-12 NOTE — TELEPHONE ENCOUNTER
Patient asking for script for potassium.    See voicemail below:    Ivy Joy [birth year], I need potassium ordered. Thank you. And marin from pharmacy. Bye.

## 2024-02-13 ENCOUNTER — TELEPHONE (OUTPATIENT)
Dept: FAMILY MEDICINE CLINIC | Facility: HOSPITAL | Age: 64
End: 2024-02-13

## 2024-02-13 ENCOUNTER — TELEPHONE (OUTPATIENT)
Dept: CARDIOLOGY CLINIC | Facility: CLINIC | Age: 64
End: 2024-02-13

## 2024-02-13 ENCOUNTER — PATIENT OUTREACH (OUTPATIENT)
Dept: FAMILY MEDICINE CLINIC | Facility: HOSPITAL | Age: 64
End: 2024-02-13

## 2024-02-13 NOTE — TELEPHONE ENCOUNTER
Hope calling to report that the patient is having trouble with her Dex Com and her regular glucometer so they have not blood sugar readings to report. Patient has had another weight gain. Yesterday she was 280lbs today she is 283lbs. No noticeable edema. Her pulse ox is 89-90% , she is on 4 liters of oxygen and has diminished lung sounds. She also reports that the patient must sit at the edge of her chair and sit really tall in order to get a good breath. Hope is asking if the doctor wants to change any of her parameters. She also noted that if the patient becomes more short of breath and the pulse ox drops any lower she will have the patient call for an ambulance.

## 2024-02-13 NOTE — PROGRESS NOTES
Outpatient Care Management Note:    Care manager called Ivy. I have worked with Ivy in the past. She states that she is doing fine. She is living alone and has a private aid weekly to assist her with showering. She has friends that assist with her transportation needs.     POLO reviewed that she needs to schedule with cardiology, pulmonary, endocrine and her PCP.  She states that she will call when her friend is there on Thursday, so she is sure she can transport her.     Ivy is diabetic. She has not been checking her blood sugars despite being on insulin. I reinforced the importance of checking her sugars before taking her insulin. She states that she will set up her dexcom today. She does have a back up glucometer which I encouraged her to use.     Ivy states that she weighed 280 lbs today. She was 270 lb on hospital discharge. She feels that the difference is because of using a different scale. She denies any breathing issues, increased shortness of breath or swelling. Cardiology was contacted by the [facility] nurse yesterday regarding her weights and fluid intake. She had a cardiology appt scheduled for tomorrow, but it was canceled. POLO did reinforce that she should reschedule her cardiology appt. She stated she will call on Thursday.  We reviewed her fluid restriction and how to measure her intake.     Ivy is on oxygen at 4 L. She states her pulse ox has been running between 92-93%. .    Ivy is being followed by [facility].     Ivy declined ongoing outreach.  I offered her my contact information and she stated that she had it.     CM returned the office call to [facility].  They will have Ivy's nurse call me back if they have any questions.     Call received from Maria Fernanda Jane [facility] nurse. I updated her on above. She will encourage Ivy to reschedule her cardiology appt. She will see Ivy today or tomorrow.

## 2024-02-13 NOTE — TELEPHONE ENCOUNTER
Received call from visiting nurses about 10 pound weight gain.  Called patient today on the phone to discuss.  She does report that her weight is now trending at 280.  It is actually been stable at 280 since her discharge.  She thinks that her scale is perhaps different compared to the scales in the hospital.  She does not believe that she is retaining any fluid.  She is taking her Bumex as directed and does report frequent urination.  Her breathing is stable.  She continues to feel well.  We discussed to continue monitoring her weights and if they remain stable where they are then we can continue current regimen.  If she does happen to begin gaining weight or retaining fluid have asked her to call us right away so we can adjust her diuretics.  She is going to discuss with her ride for when she will be able to come in to the office to be seen in person.

## 2024-02-14 NOTE — TELEPHONE ENCOUNTER
Patient is taking 2.5 in the am and 2.5 in the pm.   She is aware to go to the ER if she becomes SOB or 02 drops

## 2024-02-14 NOTE — TELEPHONE ENCOUNTER
Please call.    Agree with her sob worsens or o2 goes down she needs to go back to ER /911    She really needs to be seen.   How much of the diuretic is she taking right now.

## 2024-02-16 DIAGNOSIS — J82.83 EOSINOPHILIC ASTHMA: Primary | ICD-10-CM

## 2024-02-16 RX ORDER — EPINEPHRINE 1 MG/ML
0.3 INJECTION, SOLUTION, CONCENTRATE INTRAVENOUS
Status: CANCELLED | OUTPATIENT
Start: 2024-02-20

## 2024-02-16 NOTE — TELEPHONE ENCOUNTER
Attempted to call patient  LM to call back 
Attempted to call patient again  LM to call back 
Patient LVM returning call [phone]
Patient calling stating she went out to lunch today and got so short of breath she felt like she was gonna die  She states she has no other symptoms   Pleas advise [phone]
Patient left  stating she was in the bathroom and missed our call   Please call back at [phone]
Please schedule for sick visit with provider today, if not available sick visit in 1-2 days and I will start prednisone
Scheduled this afternoon with Stalin Munson
Spoke with Ivy   She said she has been having increased SOB since January  Yesterday she went out for the first time since December  She is SOB on exertion  She has a cough with clear mucous  She is wheezing and has chest tightness  Denies fevers and chills  She is using her inhalers  She has not started using her nebulizer  I advised her to start using it  She is wearing oxygen  It is supposed to be at 3L but she is wear 4L  She does not have a pulse ox  She said she does feel better when she lays down and wears her Cpap  Please advise 
No/Unable to asses

## 2024-02-17 DIAGNOSIS — G47.00 INSOMNIA, UNSPECIFIED TYPE: ICD-10-CM

## 2024-02-17 RX ORDER — TRAZODONE HYDROCHLORIDE 150 MG/1
150 TABLET ORAL
Qty: 90 TABLET | Refills: 0 | Status: SHIPPED | OUTPATIENT
Start: 2024-02-17

## 2024-02-19 ENCOUNTER — RA CDI HCC (OUTPATIENT)
Dept: OTHER | Facility: HOSPITAL | Age: 64
End: 2024-02-19

## 2024-02-19 NOTE — PROGRESS NOTES
HCC coding opportunities          Chart Reviewed number of suggestions sent to Provider: 2   E11.42  E11.65    Patients Insurance     Medicare Insurance: Medicare           Nurse entered room wearing face shield, hair cover, mask and gloves, patient and mother both wearing masks, R upper lid reddened and swollen X2 days, states has been holding warm cloth to it \"but it hasn't helped\" denies visual disturbances

## 2024-02-20 ENCOUNTER — HOSPITAL ENCOUNTER (OUTPATIENT)
Dept: INFUSION CENTER | Facility: HOSPITAL | Age: 64
Discharge: HOME | End: 2024-02-20
Attending: INTERNAL MEDICINE
Payer: MEDICARE

## 2024-02-20 ENCOUNTER — OFFICE VISIT (OUTPATIENT)
Age: 64
End: 2024-02-20
Payer: MEDICARE

## 2024-02-20 VITALS
WEIGHT: 276 LBS | HEIGHT: 63 IN | SYSTOLIC BLOOD PRESSURE: 106 MMHG | HEART RATE: 99 BPM | OXYGEN SATURATION: 93 % | BODY MASS INDEX: 48.9 KG/M2 | DIASTOLIC BLOOD PRESSURE: 62 MMHG

## 2024-02-20 VITALS
HEART RATE: 102 BPM | TEMPERATURE: 97.8 F | OXYGEN SATURATION: 90 % | DIASTOLIC BLOOD PRESSURE: 77 MMHG | SYSTOLIC BLOOD PRESSURE: 150 MMHG

## 2024-02-20 DIAGNOSIS — R09.89 PULMONARY VASCULAR CONGESTION: ICD-10-CM

## 2024-02-20 DIAGNOSIS — I13.0 HYPERTENSIVE HEART AND CHRONIC KIDNEY DISEASE WITH HEART FAILURE AND STAGE 1 THROUGH STAGE 4 CHRONIC KIDNEY DISEASE, OR CHRONIC KIDNEY DISEASE (HCC): ICD-10-CM

## 2024-02-20 DIAGNOSIS — I48.0 PAROXYSMAL ATRIAL FIBRILLATION (HCC): ICD-10-CM

## 2024-02-20 DIAGNOSIS — J82.83 EOSINOPHILIC ASTHMA: Primary | ICD-10-CM

## 2024-02-20 DIAGNOSIS — J96.12 CHRONIC RESPIRATORY FAILURE WITH HYPOXIA AND HYPERCAPNIA (HCC): Primary | ICD-10-CM

## 2024-02-20 DIAGNOSIS — I27.20 PULMONARY HYPERTENSION (HCC): ICD-10-CM

## 2024-02-20 DIAGNOSIS — J96.11 CHRONIC RESPIRATORY FAILURE WITH HYPOXIA AND HYPERCAPNIA (HCC): Primary | ICD-10-CM

## 2024-02-20 DIAGNOSIS — J44.89 ASTHMA-COPD OVERLAP SYNDROME: ICD-10-CM

## 2024-02-20 DIAGNOSIS — G47.33 OSA (OBSTRUCTIVE SLEEP APNEA): ICD-10-CM

## 2024-02-20 DIAGNOSIS — E66.01 MORBID OBESITY (HCC): ICD-10-CM

## 2024-02-20 PROCEDURE — 99214 OFFICE O/P EST MOD 30 MIN: CPT | Performed by: PHYSICIAN ASSISTANT

## 2024-02-20 RX ORDER — EPINEPHRINE 1 MG/ML
0.3 INJECTION, SOLUTION, CONCENTRATE INTRAVENOUS
OUTPATIENT
Start: 2024-03-18

## 2024-02-20 RX ORDER — EPINEPHRINE 1 MG/ML
0.3 INJECTION, SOLUTION, CONCENTRATE INTRAVENOUS
Status: DISCONTINUED | OUTPATIENT
Start: 2024-02-20 | End: 2024-02-23 | Stop reason: HOSPADM

## 2024-02-20 RX ADMIN — BENRALIZUMAB 30 MG: 30 INJECTION, SOLUTION SUBCUTANEOUS at 14:18

## 2024-02-20 NOTE — PROGRESS NOTES
Mattie Joy  tolerated treatment well with no complications.   Epi Pen expiration 3/2025   Completed observation time    Mattie Joy is aware of future appt on 4/16/24 at 1:30.     AVS printed and given to Mattie Joy:  Yes

## 2024-02-20 NOTE — PROGRESS NOTES
Assessment:    1. Chronic respiratory failure with hypoxia and hypercapnia (HCC)        2. Asthma-COPD overlap syndrome  glycopyrrolate-formoterol (BEVESPI AEROSPHERE) 9-4.8 MCG/ACT inhaler      3. Pulmonary vascular congestion  XR chest pa & lateral      4. Hypertensive heart and chronic kidney disease with heart failure and stage 1 through stage 4 chronic kidney disease, or chronic kidney disease (HCC)        5. WILMA (obstructive sleep apnea)        6. Pulmonary hypertension (HCC)        7. Paroxysmal atrial fibrillation (HCC)        8. Morbid obesity (HCC)            Plan:   Patient presenting for follow-up, doing much better. Feels she is at her baseline respiratory status.  Remains on 4 L.  Maintain pulse ox greater than 88%.  Discussed with her importance of using BiPAP  Her weight is up and she does appear volume overloaded a bit. She should double up on Bumex, keep close eye on weight and LE edema and call her Cardiologist. Scheduled to see them next week.  No AE of asthma/COPD, no need for steroids  Noncompliant with her Pulmicort nebs and Bevespi, education provided  Just got her Fasenra today  Continue Singulair & Claritin, albuterol inhaler/albuterol nebulizer treatments as needed  CXR ordered      Accompanied by friend/support person    Subjective:     Patient ID: Mattie Joy is a 63 y.o. female.    Chief Complaint:  Mattie Joy is a 63 y.o. year old female who presents for hospital follow-up.  She had acute on chronic respiratory failure due to CHF.  There was no exacerbation of her asthma/COPD.  She is feeling much better and has no concerns about her breathing.  She has chronic dyspnea on exertion.               The following portions of the patient's history were reviewed in this encounter and updated as appropriate: [unfilled]  Review of Systems   Constitutional:  Positive for fatigue.   Respiratory:  Positive for shortness of breath.    All other systems reviewed and are  negative.        Objective:    Physical Exam  Vitals reviewed.   Constitutional:       General: She is not in acute distress.     Appearance: She is not toxic-appearing.   HENT:      Head: Normocephalic and atraumatic.   Eyes:      General: No scleral icterus.  Cardiovascular:      Rate and Rhythm: Normal rate and regular rhythm.   Pulmonary:      Effort: Pulmonary effort is normal.      Comments: Decreased breath sounds  Musculoskeletal:      Right lower leg: Edema present.      Left lower leg: Edema present.   Skin:     General: Skin is warm and dry.   Neurological:      General: No focal deficit present.      Mental Status: She is alert. Mental status is at baseline.   Psychiatric:         Mood and Affect: Mood normal.         Behavior: Behavior normal.         Lab Review:   No results displayed because visit has over 200 results.

## 2024-02-21 ENCOUNTER — TELEPHONE (OUTPATIENT)
Dept: CARDIOLOGY CLINIC | Facility: CLINIC | Age: 64
End: 2024-02-21

## 2024-02-21 ENCOUNTER — TELEPHONE (OUTPATIENT)
Age: 64
End: 2024-02-21

## 2024-02-21 NOTE — TELEPHONE ENCOUNTER
"Incoming call: Maria Fernanda from [facility]    Re: Pt of Suyapa España RN would like clarification on the bumex order - pt was told to increase bumex to 3 tabs daily, but was not sure for how long.   Current order reads:  \"Take 2.5 tablets (5 mg total) by mouth 2 (two) times a day\"     Please call and clarify:   Phone (NALLELY - Maria Fernanda)  [phone]  "

## 2024-02-21 NOTE — TELEPHONE ENCOUNTER
Vandana, this is Nurse Maria Fernanda Bernard from [facility]. I'm calling in regards to your patient Vicki Joy's date of birth [birth year]. She originally was scheduled to see the doctor on Wednesday the 21st. Now she's telling me that she's not going to keep that appointment.     Just wanted to follow up because the patient does continue have shortness of breath and remains non compliant with some of her medications. So if you have a concern that I can help you with, let me know [phone]. Thank you.

## 2024-02-22 ENCOUNTER — TELEPHONE (OUTPATIENT)
Dept: FAMILY MEDICINE CLINIC | Facility: HOSPITAL | Age: 64
End: 2024-02-22

## 2024-02-22 DIAGNOSIS — B37.31 YEAST VAGINITIS: Primary | ICD-10-CM

## 2024-02-22 RX ORDER — FLUCONAZOLE 150 MG/1
TABLET ORAL
Qty: 2 TABLET | Refills: 0 | Status: SHIPPED | OUTPATIENT
Start: 2024-02-22 | End: 2024-02-22

## 2024-02-22 NOTE — TELEPHONE ENCOUNTER
She definitely thinks its a yeast infection.  Its very itchy.  Has no transportation and she asked if we could call in RIVS.  The pharmacy will deliver that to her.  Please advise.

## 2024-02-23 ENCOUNTER — TELEPHONE (OUTPATIENT)
Dept: ENDOCRINOLOGY | Facility: HOSPITAL | Age: 64
End: 2024-02-23

## 2024-02-23 NOTE — PROGRESS NOTES
Cardiology Office Follow Up  Mattie Joy  1960  [id number]      ASSESSMENT:  Chronic diastolic heart failure  8/15/2023 echo: LVEF 65%, grade 2 diastolic dysfunction, severely elevated RV systolic pressure 61 mmHg  Current diuretic: Bumex 5 BID  Wt as low as 265 in the hospital February 2024  Persistent atrial fibrillation  Thromboembolic PPx: Eliquis 5 twice daily  Rate control: Toprol- daily  COPD with acute exacerbation  Type 2 diabetes  Obesity, BMI 51  Pulmonary hypertension likely group 2/3 from left heart disease, underlying lung disease, OHV    PLAN/ DISCUSSION:  Volume overloaded today with approximately 15 pound weight gain since her discharge 2 weeks ago. She is hypoxic with oxygen saturation of 80% despite 5 L nasal cannula which is the maximum rate of her portable machine. Patient sent directly to the ER via ambulance from the office today.  Unfortunately this will represent a readmission. Ivy is accompanied by a childhood friend today who does express concern that Ivy is unable to care for herself at home. She is not sure if she is taking medications as directed. She recently signed up for Meals on Wheels but also reports that a friend is bringing her groceries still high in sodium eating salted pretzels on a very regular basis and drinking several liters of fluid daily.   Once closer to euvolemic and on discharge she would benefit from at least rehab if not long-term assisted living facility. This may be the only way to prevent future exacerbations of congestive heart failure    Interval History/ HPI:   63-year-old female well-known to our practice.  Was just discharged earlier this month for congestive heart failure.  Since then she is been drinking greater than 3 L of fluid per day and intermittently taking medications.  She is here today for follow-up.    Today she presents to the office accompanied by a friend.  She reports unintentional 15 pound weight gain over the last 2 weeks.   "She did sign up for Meals on Wheels and is having meals delivered as she cannot cook.  She is minimally ambulatory at baseline.  She also reports that her friend is still bringing her groceries but she eats a lot of salty pretzels and has extreme difficulty limiting her fluid intake.  Her childhood friend is present today confirming that she is concerned that she does have an addiction to salt and caffeine.  She is short of breath today.  She has chest pressure.  Her oxygen is 80%.  She was sent to the ER via ambulance     Vitals:  /56 (BP Location: Left arm, Patient Position: Sitting, Cuff Size: Standard)   Pulse (!) 120   Ht 5' 3\" (1.6 m)   Wt 127 kg (279 lb) Comment: pt stated- is in W/C  SpO2 (!) 80%   BMI 49.42 kg/m²      Past Medical History:   Diagnosis Date    Acute blood loss anemia 06/01/2021    Acute diverticulitis 05/02/2021    Acute on chronic diastolic congestive heart failure (HCC) 05/21/2020    Acute on chronic respiratory failure with hypoxia (HCC) 11/30/2018    Alcohol abuse 05/21/2020    Anemia     Asthma with COPD with exacerbation  11/12/2020    Atrial fibrillation (HCC)     Cervical radiculopathy     CHF (congestive heart failure) (HCC)     Chronic low back pain     Chronic obstructive asthma 02/20/2018    Cigarette nicotine dependence without complication 11/20/2019    Quit 12/10/2019.     Community acquired pneumonia 05/21/2020    COPD exacerbation (HCC) 09/16/2020    Depression with anxiety 03/09/2014    Diabetes mellitus with hyperglycemia (HCC)     Diverticulitis 06/06/2021    Elevated liver enzymes     GERD (gastroesophageal reflux disease)     Hypersomnolence 10/28/2020    Hypokalemia 12/04/2018    Lower gastrointestinal bleeding 06/01/2021    Paresthesia of upper extremity     Plantar fasciitis     Restless legs syndrome 04/08/2014    Severe persistent asthma with exacerbation 02/20/2018    PFTs February 8, 2018:  FEV1 0.87 L-35% predicted, 27% improvement " post-bronchodilator.  DLCO-82% predicted,  The patient has been having worsening of her symptoms now for few months, especially  from 2020, also she had multiple exacerbations last year and most recently required a steroid burst and August  Reviewing her CT scan  New PFTs with severe obstruction reviewed  Hypersensitivity p    Sexual dysfunction     Sleep apnea, obstructive     Tenosynovitis of finger     Tinea corporis     Tobacco use 2018    Currently smoking 3-4 cigarettes daily    Trigger middle finger of left hand 2017    Trigger ring finger of left hand 2017    Weakness of upper extremity      Social History     Socioeconomic History    Marital status: Significant Other     Spouse name: Not on file    Number of children: Not on file    Years of education: Not on file    Highest education level: Not on file   Occupational History    Not on file   Tobacco Use    Smoking status: Former     Current packs/day: 0.00     Average packs/day: 0.3 packs/day for 29.9 years (7.5 ttl pk-yrs)     Types: Cigarettes     Start date:      Quit date: 12/10/2019     Years since quittin.2    Smokeless tobacco: Never   Vaping Use    Vaping status: Never Used   Substance and Sexual Activity    Alcohol use: Not Currently     Alcohol/week: 20.0 standard drinks of alcohol     Types: 20 Cans of beer per week     Comment: about 6 coors light every night, stopped in     Drug use: No    Sexual activity: Not Currently   Other Topics Concern    Not on file   Social History Narrative    Not on file     Social Determinants of Health     Financial Resource Strain: Not on file   Food Insecurity: No Food Insecurity (2024)    Hunger Vital Sign     Worried About Running Out of Food in the Last Year: Never true     Ran Out of Food in the Last Year: Never true   Transportation Needs: No Transportation Needs (2024)    PRAPARE - Transportation     Lack of Transportation (Medical): No     Lack of  Transportation (Non-Medical): No   Physical Activity: Not on file   Stress: Not on file   Social Connections: Not on file   Intimate Partner Violence: Not on file   Housing Stability: Low Risk  (2024)    Housing Stability Vital Sign     Unable to Pay for Housing in the Last Year: No     Number of Places Lived in the Last Year: 1     Unstable Housing in the Last Year: No      Family History   Problem Relation Age of Onset    Alzheimer's disease Mother     Atrial fibrillation Mother     Dementia Mother     Heart disease Mother         heart problem    Seizures Mother     Parkinsonism Father     Arthritis Father     Atrial fibrillation Father     No Known Problems Daughter     Cri-du-chat syndrome Daughter     Colon cancer Maternal Grandmother 80    Diabetes type II Maternal Grandmother     No Known Problems Brother     No Known Problems Son     Substance Abuse Neg Hx         mother,father    Mental illness Neg Hx         mother,father    Colon polyps Neg Hx      Past Surgical History:   Procedure Laterality Date    ABDOMINAL SURGERY      CARPAL TUNNEL RELEASE Bilateral      SECTION      CHOLECYSTECTOMY      laparoscopic    ESOPHAGOGASTRODUODENOSCOPY N/A 12/3/2018    Procedure: ESOPHAGOGASTRODUODENOSCOPY (EGD) AND COLONOSCOPY;  Surgeon: Ludmila Perry MD;  Location: [facility];  Service: Gastroenterology    GASTRIC BYPASS      for morbid obesity with gilda en y    HERNIA REPAIR      HYSTERECTOMY      NY EXCISION GANGLION WRIST DORSAL/VOLAR PRIMARY Left 2017    Procedure: LONG FINGER GANGLION CYST EXCISION;  Surgeon: Philippe Clark MD;  Location: [facility];  Service: Orthopedics    NY TENDON SHEATH INCISION Left 2017    Procedure: THUMB, LONG, RING, TRIGGER FINGER RELEASE;  Surgeon: Philippe Clark MD;  Location: [facility];  Service: Orthopedics    SHOULDER SURGERY Right        Current Outpatient Medications:     albuterol (2.5 mg/3 mL) 0.083 % nebulizer solution, Take 3 mL (2.5 mg total)  by nebulization every 6 (six) hours as needed for wheezing or shortness of breath, Disp: 1080 mL, Rfl: 3    albuterol (PROVENTIL HFA,VENTOLIN HFA) 90 mcg/act inhaler, Inhale 2 puffs every 4 (four) hours as needed for wheezing, Disp: 8.5 g, Rfl: 5    apixaban (Eliquis) 5 mg, Take 1 tablet (5 mg total) by mouth 2 (two) times a day, Disp: 60 tablet, Rfl: 2    atorvastatin (LIPITOR) 40 mg tablet, Take 1 tablet (40 mg total) by mouth daily, Disp: 90 tablet, Rfl: 3    Benralizumab 30 MG/ML SOAJ, Inject 1 mL under the skin every 28 days, Disp: 1 mL, Rfl: 11    Blood Glucose Monitoring Suppl (Contour Next One) AILYN, USE AS DIRECTED 3 TIMES A DAY, Disp: 1 each, Rfl: 0    budesonide (PULMICORT) 0.5 mg/2 mL nebulizer solution, TAKE 2 ML (0.5 MG TOTAL) BY NEBULIZATION TWICE A DAY RINSE MOUTH AFTER USE, Disp: 60 mL, Rfl: 3    bumetanide (BUMEX) 2 mg tablet, Take 2.5 tablets (5 mg total) by mouth 2 (two) times a day, Disp: 120 tablet, Rfl: 0    Contour Next Test test strip, USE TO TEST BLOOD SUGAR 3 TIMES A DAY, Disp: 100 strip, Rfl: 3    [employer] Lancets Thin 26G MISC, USE 3 (THREE) TIMES A DAY, Disp: 100 each, Rfl: 5    Empagliflozin (JARDIANCE) 10 MG TABS tablet, Take 1 tablet (10 mg total) by mouth daily, Disp: 60 tablet, Rfl: 0    EPINEPHrine (EPIPEN) 0.3 mg/0.3 mL SOAJ, Inject 0.3 mL (0.3 mg total) into a muscle once for 1 dose, Disp: 0.6 mL, Rfl: 0    escitalopram (LEXAPRO) 20 mg tablet, TAKE 1 TABLET BY MOUTH EVERY DAY, Disp: 90 tablet, Rfl: 0    ferrous sulfate 220 (44 Fe) mg/5 mL solution, TAKE 5 ML (220 MG TOTAL) BY MOUTH 2 (TWO) TIMES A DAY BEFORE MEALS, Disp: 473 mL, Rfl: 2    fluticasone (FLONASE) 50 mcg/act nasal spray, 1 spray into each nostril 2 (two) times a day, Disp: 1 Bottle, Rfl: 3    glycopyrrolate-formoterol (BEVESPI AEROSPHERE) 9-4.8 MCG/ACT inhaler, Inhale 2 puffs 2 (two) times a day, Disp: 10.7 g, Rfl: 5    Insulin Glargine Solostar (Lantus SoloStar) 100 UNIT/ML SOPN, Inject 0.58 mL (58 Units total) as  directed daily at bedtime Inject 55 units daily at bedtime, Disp: 15 mL, Rfl: 0    insulin glulisine (Apidra SoloStar) 100 units/mL injection pen, Inject 25 Units under the skin 3 (three) times a day with meals 25 units 3 times a day with meals, Disp: 15 mL, Rfl: 0    Insulin Pen Needle (BD Pen Needle Love 2nd Gen) 32G X 4 MM MISC, Use daily at bedtime Use 4 new needles daily ., Disp: 400 each, Rfl: 3    levalbuterol (XOPENEX) 1.25 mg/0.5 mL nebulizer solution, Take 0.5 mL (1.25 mg total) by nebulization 2 (two) times a day, Disp: 60 vial, Rfl: 0    loratadine (CLARITIN) 10 mg tablet, Take 1 tablet (10 mg total) by mouth daily as needed for allergies, Disp: 30 tablet, Rfl: 0    LORazepam (ATIVAN) 0.5 mg tablet, TAKE 2 TABLETS BY MOUTH AT BEDTIME AND 1 EVERY 6 HOURS AS NEEDED FOR ANXIETY, Disp: 90 tablet, Rfl: 0    metoprolol succinate (TOPROL-XL) 100 mg 24 hr tablet, Take 1 tablet (100 mg total) by mouth daily, Disp: 90 tablet, Rfl: 3    montelukast (SINGULAIR) 10 mg tablet, TAKE 1 TABLET BY MOUTH EVERYDAY AT BEDTIME, Disp: 90 tablet, Rfl: 3    naloxone (NARCAN) 4 mg/0.1 mL nasal spray, Administer 1 spray into a nostril. If breathing does not return to normal or if breathing difficulty resumes after 2-3 minutes, give another dose in the other nostril using a new spray., Disp: 1 each, Rfl: 1    nystatin (MYCOSTATIN) powder, Apply topically 2 (two) times a day, Disp: 30 g, Rfl: 0    omeprazole (PriLOSEC) 40 MG capsule, Take 1 capsule (40 mg total) by mouth daily, Disp: 90 capsule, Rfl: 3    potassium chloride (Klor-Con M20) 20 mEq tablet, Take 2 tablets (40 mEq total) by mouth daily, Disp: 180 tablet, Rfl: 3    spironolactone (ALDACTONE) 25 mg tablet, Take 1 tablet (25 mg total) by mouth daily, Disp: 60 tablet, Rfl: 0    traZODone (DESYREL) 150 mg tablet, TAKE 1 TABLET BY MOUTH EVERYDAY AT BEDTIME, Disp: 90 tablet, Rfl: 0  No current facility-administered medications for this visit.      Review of Systems:  Review of  Systems   Constitutional:  Negative for chills and fever.   HENT:  Negative for ear pain and sore throat.    Eyes:  Negative for pain and visual disturbance.   Respiratory:  Positive for shortness of breath. Negative for cough.    Cardiovascular:  Positive for chest pain. Negative for palpitations.   Gastrointestinal:  Negative for abdominal pain and vomiting.   Genitourinary:  Negative for dysuria and hematuria.   Musculoskeletal:  Negative for arthralgias and back pain.   Skin:  Negative for color change and rash.   Neurological:  Negative for seizures and syncope.   All other systems reviewed and are negative.        Physical Exam:  Physical Exam  Constitutional:       General: She is not in acute distress.     Appearance: Normal appearance. She is not ill-appearing.      Comments: Pale, chronically ill-appearing   HENT:      Head: Normocephalic and atraumatic.      Right Ear: External ear normal.      Left Ear: External ear normal.      Mouth/Throat:      Pharynx: No oropharyngeal exudate or posterior oropharyngeal erythema.   Eyes:      General:         Right eye: No discharge.         Left eye: No discharge.      Conjunctiva/sclera: Conjunctivae normal.      Pupils: Pupils are equal, round, and reactive to light.   Cardiovascular:      Rate and Rhythm: Tachycardia present. Rhythm irregular.      Pulses: Normal pulses.      Heart sounds: Normal heart sounds. No murmur heard.     No friction rub. No gallop.   Pulmonary:      Effort: Pulmonary effort is normal.      Breath sounds: Normal breath sounds. No wheezing, rhonchi or rales.   Abdominal:      General: Abdomen is flat. There is no distension.      Palpations: Abdomen is soft.      Tenderness: There is no abdominal tenderness.   Musculoskeletal:         General: No swelling, tenderness or deformity. Normal range of motion.      Cervical back: Normal range of motion.   Skin:     General: Skin is warm and dry.   Neurological:      General: No focal deficit  present.      Mental Status: She is alert and oriented to person, place, and time.         This note was completed in part utilizing M-Modal Fluency Direct Software.  Grammatical errors, random word insertions, spelling mistakes, and incomplete sentences can be an occasional consequence of this system secondary to software limitations, ambient noise, and hardware issues.  If you have any questions or concerns about the content, text, or information contained within the body of this dictation, please contact the provider for clarification.

## 2024-02-23 NOTE — TELEPHONE ENCOUNTER
Received call from [facility] nurse, call back . She called to report elevated random glucose today of 323 which was an hour after the patient had chicken noodle soup. Patient is intermittently using the Dexcom.   Random glucose on 2/19 of 255, 2/16 124, 2/15 259.

## 2024-02-26 ENCOUNTER — HOSPITAL ENCOUNTER (INPATIENT)
Facility: HOSPITAL | Age: 64
LOS: 9 days | Discharge: HOME HEALTH | DRG: 291 | End: 2024-03-06
Attending: EMERGENCY MEDICINE | Admitting: INTERNAL MEDICINE
Payer: MEDICARE

## 2024-02-26 ENCOUNTER — OFFICE VISIT (OUTPATIENT)
Dept: CARDIOLOGY CLINIC | Facility: CLINIC | Age: 64
End: 2024-02-26
Payer: MEDICARE

## 2024-02-26 ENCOUNTER — OFFICE VISIT (OUTPATIENT)
Dept: FAMILY MEDICINE CLINIC | Facility: HOSPITAL | Age: 64
End: 2024-02-26
Payer: MEDICARE

## 2024-02-26 ENCOUNTER — APPOINTMENT (EMERGENCY)
Dept: RADIOLOGY | Facility: HOSPITAL | Age: 64
DRG: 291 | End: 2024-02-26
Payer: MEDICARE

## 2024-02-26 VITALS
HEART RATE: 120 BPM | BODY MASS INDEX: 49.43 KG/M2 | DIASTOLIC BLOOD PRESSURE: 56 MMHG | HEIGHT: 63 IN | WEIGHT: 279 LBS | SYSTOLIC BLOOD PRESSURE: 100 MMHG | OXYGEN SATURATION: 80 %

## 2024-02-26 VITALS
SYSTOLIC BLOOD PRESSURE: 98 MMHG | OXYGEN SATURATION: 79 % | DIASTOLIC BLOOD PRESSURE: 60 MMHG | WEIGHT: 279 LBS | TEMPERATURE: 98 F | HEART RATE: 104 BPM | BODY MASS INDEX: 49.43 KG/M2 | HEIGHT: 63 IN

## 2024-02-26 DIAGNOSIS — I50.33 ACUTE ON CHRONIC DIASTOLIC HEART FAILURE (HCC): ICD-10-CM

## 2024-02-26 DIAGNOSIS — I50.9 ACUTE EXACERBATION OF CHF (CONGESTIVE HEART FAILURE) (HCC): ICD-10-CM

## 2024-02-26 DIAGNOSIS — Z79.4 TYPE 2 DIABETES MELLITUS WITH STAGE 2 CHRONIC KIDNEY DISEASE, WITH LONG-TERM CURRENT USE OF INSULIN (HCC): ICD-10-CM

## 2024-02-26 DIAGNOSIS — E11.22 TYPE 2 DIABETES MELLITUS WITH STAGE 2 CHRONIC KIDNEY DISEASE, WITHOUT LONG-TERM CURRENT USE OF INSULIN: ICD-10-CM

## 2024-02-26 DIAGNOSIS — I50.9 ACUTE EXACERBATION OF CHF (CONGESTIVE HEART FAILURE) (HCC): Primary | ICD-10-CM

## 2024-02-26 DIAGNOSIS — N18.2 TYPE 2 DIABETES MELLITUS WITH STAGE 2 CHRONIC KIDNEY DISEASE, WITH LONG-TERM CURRENT USE OF INSULIN (HCC): ICD-10-CM

## 2024-02-26 DIAGNOSIS — I50.33 ACUTE ON CHRONIC HEART FAILURE WITH PRESERVED EJECTION FRACTION (HCC): Primary | ICD-10-CM

## 2024-02-26 DIAGNOSIS — N18.2 TYPE 2 DIABETES MELLITUS WITH STAGE 2 CHRONIC KIDNEY DISEASE, WITHOUT LONG-TERM CURRENT USE OF INSULIN: ICD-10-CM

## 2024-02-26 DIAGNOSIS — E11.22 TYPE 2 DIABETES MELLITUS WITH STAGE 2 CHRONIC KIDNEY DISEASE, WITH LONG-TERM CURRENT USE OF INSULIN (HCC): ICD-10-CM

## 2024-02-26 DIAGNOSIS — J44.1 COPD WITH ACUTE EXACERBATION (HCC): ICD-10-CM

## 2024-02-26 DIAGNOSIS — J44.9 COPD, SEVERE (HCC): ICD-10-CM

## 2024-02-26 DIAGNOSIS — I48.91 ATRIAL FIBRILLATION, UNSPECIFIED TYPE (HCC): ICD-10-CM

## 2024-02-26 DIAGNOSIS — F32.1 CURRENT MODERATE EPISODE OF MAJOR DEPRESSIVE DISORDER WITHOUT PRIOR EPISODE (HCC): ICD-10-CM

## 2024-02-26 PROBLEM — J96.20 ACUTE ON CHRONIC RESPIRATORY FAILURE (HCC): Status: ACTIVE | Noted: 2018-11-30

## 2024-02-26 LAB
2HR DELTA HS TROPONIN: 1 NG/L
4HR DELTA HS TROPONIN: 1 NG/L
ALBUMIN SERPL BCP-MCNC: 4.1 G/DL (ref 3.5–5)
ALP SERPL-CCNC: 147 U/L (ref 34–104)
ALT SERPL W P-5'-P-CCNC: 26 U/L (ref 7–52)
ANION GAP SERPL CALCULATED.3IONS-SCNC: 8 MMOL/L
APTT PPP: 32 SECONDS (ref 23–37)
AST SERPL W P-5'-P-CCNC: 20 U/L (ref 13–39)
ATRIAL RATE: 147 BPM
BACTERIA UR QL AUTO: ABNORMAL /HPF
BASE EXCESS BLDA CALC-SCNC: 12 MMOL/L (ref -2–3)
BASOPHILS # BLD AUTO: 0.02 THOUSANDS/ÂΜL (ref 0–0.1)
BASOPHILS NFR BLD AUTO: 0 % (ref 0–1)
BILIRUB SERPL-MCNC: 0.39 MG/DL (ref 0.2–1)
BILIRUB UR QL STRIP: NEGATIVE
BNP SERPL-MCNC: 177 PG/ML (ref 0–100)
BUN SERPL-MCNC: 16 MG/DL (ref 5–25)
CA-I BLD-SCNC: 0.86 MMOL/L (ref 1.12–1.32)
CALCIUM SERPL-MCNC: 9.3 MG/DL (ref 8.4–10.2)
CARDIAC TROPONIN I PNL SERPL HS: 4 NG/L
CARDIAC TROPONIN I PNL SERPL HS: 5 NG/L
CARDIAC TROPONIN I PNL SERPL HS: 5 NG/L
CHLORIDE SERPL-SCNC: 94 MMOL/L (ref 96–108)
CLARITY UR: CLEAR
CO2 SERPL-SCNC: 37 MMOL/L (ref 21–32)
COLOR UR: ABNORMAL
CREAT SERPL-MCNC: 0.72 MG/DL (ref 0.6–1.3)
EOSINOPHIL # BLD AUTO: 0 THOUSAND/ÂΜL (ref 0–0.61)
EOSINOPHIL NFR BLD AUTO: 0 % (ref 0–6)
ERYTHROCYTE [DISTWIDTH] IN BLOOD BY AUTOMATED COUNT: 18.7 % (ref 11.6–15.1)
FLUAV RNA RESP QL NAA+PROBE: NEGATIVE
FLUBV RNA RESP QL NAA+PROBE: NEGATIVE
GFR SERPL CREATININE-BSD FRML MDRD: 88 ML/MIN/1.73SQ M
GLUCOSE SERPL-MCNC: 145 MG/DL (ref 65–140)
GLUCOSE SERPL-MCNC: 233 MG/DL (ref 65–140)
GLUCOSE SERPL-MCNC: 83 MG/DL (ref 65–140)
GLUCOSE SERPL-MCNC: 87 MG/DL (ref 65–140)
GLUCOSE UR STRIP-MCNC: ABNORMAL MG/DL
HCO3 BLDA-SCNC: 32 MMOL/L (ref 24–30)
HCT VFR BLD AUTO: 35.1 % (ref 34.8–46.1)
HCT VFR BLD CALC: 35 % (ref 34.8–46.1)
HGB BLD-MCNC: 9.8 G/DL (ref 11.5–15.4)
HGB BLDA-MCNC: 11.9 G/DL (ref 11.5–15.4)
HGB UR QL STRIP.AUTO: ABNORMAL
IMM GRANULOCYTES # BLD AUTO: 0.07 THOUSAND/UL (ref 0–0.2)
IMM GRANULOCYTES NFR BLD AUTO: 1 % (ref 0–2)
INR PPP: 1.33 (ref 0.84–1.19)
KETONES UR STRIP-MCNC: NEGATIVE MG/DL
LACTATE SERPL-SCNC: 0.8 MMOL/L (ref 0.5–2)
LACTATE SERPL-SCNC: 2.3 MMOL/L (ref 0.5–2)
LEUKOCYTE ESTERASE UR QL STRIP: ABNORMAL
LYMPHOCYTES # BLD AUTO: 2.45 THOUSANDS/ÂΜL (ref 0.6–4.47)
LYMPHOCYTES NFR BLD AUTO: 20 % (ref 14–44)
MCH RBC QN AUTO: 19.1 PG (ref 26.8–34.3)
MCHC RBC AUTO-ENTMCNC: 27.9 G/DL (ref 31.4–37.4)
MCV RBC AUTO: 69 FL (ref 82–98)
MONOCYTES # BLD AUTO: 0.78 THOUSAND/ÂΜL (ref 0.17–1.22)
MONOCYTES NFR BLD AUTO: 6 % (ref 4–12)
NEUTROPHILS # BLD AUTO: 8.86 THOUSANDS/ÂΜL (ref 1.85–7.62)
NEUTS SEG NFR BLD AUTO: 73 % (ref 43–75)
NITRITE UR QL STRIP: NEGATIVE
NON-SQ EPI CELLS URNS QL MICRO: ABNORMAL /HPF
NRBC BLD AUTO-RTO: 0 /100 WBCS
PCO2 BLD: 27.9 MM HG (ref 42–50)
PCO2 BLD: 33 MMOL/L (ref 21–32)
PH BLD: 7.67 [PH] (ref 7.3–7.4)
PH UR STRIP.AUTO: 5.5 [PH]
PLATELET # BLD AUTO: 301 THOUSANDS/UL (ref 149–390)
PMV BLD AUTO: 11.5 FL (ref 8.9–12.7)
PO2 BLD: 121 MM HG (ref 35–45)
POTASSIUM BLD-SCNC: 3.9 MMOL/L (ref 3.5–5.3)
POTASSIUM SERPL-SCNC: 3.6 MMOL/L (ref 3.5–5.3)
PROCALCITONIN SERPL-MCNC: 0.06 NG/ML
PROT SERPL-MCNC: 6.9 G/DL (ref 6.4–8.4)
PROT UR STRIP-MCNC: NEGATIVE MG/DL
PROTHROMBIN TIME: 16.9 SECONDS (ref 11.6–14.5)
QRS AXIS: 82 DEGREES
QRSD INTERVAL: 82 MS
QT INTERVAL: 300 MS
QTC INTERVAL: 400 MS
RBC # BLD AUTO: 5.12 MILLION/UL (ref 3.81–5.12)
RBC #/AREA URNS AUTO: ABNORMAL /HPF
RSV RNA RESP QL NAA+PROBE: NEGATIVE
SAO2 % BLD FROM PO2: 99 % (ref 60–85)
SARS-COV-2 RNA RESP QL NAA+PROBE: NEGATIVE
SODIUM BLD-SCNC: 135 MMOL/L (ref 136–145)
SODIUM SERPL-SCNC: 139 MMOL/L (ref 135–147)
SP GR UR STRIP.AUTO: 1.01 (ref 1–1.03)
SPECIMEN SOURCE: ABNORMAL
T WAVE AXIS: 72 DEGREES
UROBILINOGEN UR STRIP-ACNC: <2 MG/DL
VENTRICULAR RATE: 107 BPM
WBC # BLD AUTO: 12.18 THOUSAND/UL (ref 4.31–10.16)
WBC #/AREA URNS AUTO: ABNORMAL /HPF

## 2024-02-26 PROCEDURE — 99285 EMERGENCY DEPT VISIT HI MDM: CPT

## 2024-02-26 PROCEDURE — 85610 PROTHROMBIN TIME: CPT | Performed by: EMERGENCY MEDICINE

## 2024-02-26 PROCEDURE — 99495 TRANSJ CARE MGMT MOD F2F 14D: CPT | Performed by: FAMILY MEDICINE

## 2024-02-26 PROCEDURE — 0241U HB NFCT DS VIR RESP RNA 4 TRGT: CPT | Performed by: EMERGENCY MEDICINE

## 2024-02-26 PROCEDURE — 99223 1ST HOSP IP/OBS HIGH 75: CPT | Performed by: INTERNAL MEDICINE

## 2024-02-26 PROCEDURE — 84132 ASSAY OF SERUM POTASSIUM: CPT

## 2024-02-26 PROCEDURE — 87040 BLOOD CULTURE FOR BACTERIA: CPT | Performed by: EMERGENCY MEDICINE

## 2024-02-26 PROCEDURE — 82947 ASSAY GLUCOSE BLOOD QUANT: CPT

## 2024-02-26 PROCEDURE — 85014 HEMATOCRIT: CPT

## 2024-02-26 PROCEDURE — 83605 ASSAY OF LACTIC ACID: CPT | Performed by: EMERGENCY MEDICINE

## 2024-02-26 PROCEDURE — 93010 ELECTROCARDIOGRAM REPORT: CPT | Performed by: INTERNAL MEDICINE

## 2024-02-26 PROCEDURE — 94660 CPAP INITIATION&MGMT: CPT

## 2024-02-26 PROCEDURE — 36415 COLL VENOUS BLD VENIPUNCTURE: CPT | Performed by: EMERGENCY MEDICINE

## 2024-02-26 PROCEDURE — 85730 THROMBOPLASTIN TIME PARTIAL: CPT | Performed by: EMERGENCY MEDICINE

## 2024-02-26 PROCEDURE — 85025 COMPLETE CBC W/AUTO DIFF WBC: CPT | Performed by: EMERGENCY MEDICINE

## 2024-02-26 PROCEDURE — 71045 X-RAY EXAM CHEST 1 VIEW: CPT

## 2024-02-26 PROCEDURE — 83880 ASSAY OF NATRIURETIC PEPTIDE: CPT | Performed by: EMERGENCY MEDICINE

## 2024-02-26 PROCEDURE — 82948 REAGENT STRIP/BLOOD GLUCOSE: CPT

## 2024-02-26 PROCEDURE — 80053 COMPREHEN METABOLIC PANEL: CPT | Performed by: EMERGENCY MEDICINE

## 2024-02-26 PROCEDURE — 99214 OFFICE O/P EST MOD 30 MIN: CPT | Performed by: PHYSICIAN ASSISTANT

## 2024-02-26 PROCEDURE — 84145 PROCALCITONIN (PCT): CPT | Performed by: EMERGENCY MEDICINE

## 2024-02-26 PROCEDURE — 94760 N-INVAS EAR/PLS OXIMETRY 1: CPT

## 2024-02-26 PROCEDURE — 93000 ELECTROCARDIOGRAM COMPLETE: CPT | Performed by: PHYSICIAN ASSISTANT

## 2024-02-26 PROCEDURE — 93005 ELECTROCARDIOGRAM TRACING: CPT

## 2024-02-26 PROCEDURE — 81001 URINALYSIS AUTO W/SCOPE: CPT | Performed by: EMERGENCY MEDICINE

## 2024-02-26 PROCEDURE — 82330 ASSAY OF CALCIUM: CPT

## 2024-02-26 PROCEDURE — 84484 ASSAY OF TROPONIN QUANT: CPT | Performed by: EMERGENCY MEDICINE

## 2024-02-26 PROCEDURE — 87086 URINE CULTURE/COLONY COUNT: CPT | Performed by: EMERGENCY MEDICINE

## 2024-02-26 PROCEDURE — 94002 VENT MGMT INPAT INIT DAY: CPT

## 2024-02-26 PROCEDURE — 84295 ASSAY OF SERUM SODIUM: CPT

## 2024-02-26 PROCEDURE — 82803 BLOOD GASES ANY COMBINATION: CPT

## 2024-02-26 PROCEDURE — 99291 CRITICAL CARE FIRST HOUR: CPT | Performed by: EMERGENCY MEDICINE

## 2024-02-26 RX ORDER — PANTOPRAZOLE SODIUM 40 MG/1
40 TABLET, DELAYED RELEASE ORAL
Status: DISCONTINUED | OUTPATIENT
Start: 2024-02-27 | End: 2024-03-06 | Stop reason: HOSPADM

## 2024-02-26 RX ORDER — INSULIN LISPRO 100 [IU]/ML
2-12 INJECTION, SOLUTION INTRAVENOUS; SUBCUTANEOUS
Status: DISCONTINUED | OUTPATIENT
Start: 2024-02-26 | End: 2024-03-06 | Stop reason: HOSPADM

## 2024-02-26 RX ORDER — FLUTICASONE PROPIONATE 50 MCG
1 SPRAY, SUSPENSION (ML) NASAL 2 TIMES DAILY
Status: DISCONTINUED | OUTPATIENT
Start: 2024-02-26 | End: 2024-03-06 | Stop reason: HOSPADM

## 2024-02-26 RX ORDER — BUDESONIDE 0.5 MG/2ML
0.5 INHALANT ORAL
Status: DISCONTINUED | OUTPATIENT
Start: 2024-02-26 | End: 2024-03-06 | Stop reason: HOSPADM

## 2024-02-26 RX ORDER — LORAZEPAM 0.5 MG/1
0.5 TABLET ORAL DAILY PRN
Status: DISCONTINUED | OUTPATIENT
Start: 2024-02-26 | End: 2024-03-06 | Stop reason: HOSPADM

## 2024-02-26 RX ORDER — SPIRONOLACTONE 25 MG/1
50 TABLET ORAL DAILY
Status: DISCONTINUED | OUTPATIENT
Start: 2024-02-27 | End: 2024-03-01

## 2024-02-26 RX ORDER — MONTELUKAST SODIUM 10 MG/1
10 TABLET ORAL
Status: DISCONTINUED | OUTPATIENT
Start: 2024-02-26 | End: 2024-03-06 | Stop reason: HOSPADM

## 2024-02-26 RX ORDER — BUMETANIDE 2 MG/1
6 TABLET ORAL 2 TIMES DAILY
Qty: 180 TABLET | Refills: 0 | Status: ON HOLD | OUTPATIENT
Start: 2024-02-26 | End: 2024-03-27

## 2024-02-26 RX ORDER — DULOXETIN HYDROCHLORIDE 60 MG/1
60 CAPSULE, DELAYED RELEASE ORAL DAILY
Status: ON HOLD | COMMUNITY
Start: 2024-02-21

## 2024-02-26 RX ORDER — DULOXETIN HYDROCHLORIDE 30 MG/1
60 CAPSULE, DELAYED RELEASE ORAL DAILY
Status: DISCONTINUED | OUTPATIENT
Start: 2024-02-27 | End: 2024-03-06 | Stop reason: HOSPADM

## 2024-02-26 RX ORDER — FLUCONAZOLE 150 MG/1
TABLET ORAL
COMMUNITY
Start: 2024-02-23 | End: 2024-02-26 | Stop reason: CLARIF

## 2024-02-26 RX ORDER — BUMETANIDE 0.25 MG/ML
0.5 INJECTION INTRAMUSCULAR; INTRAVENOUS CONTINUOUS
Status: DISCONTINUED | OUTPATIENT
Start: 2024-02-26 | End: 2024-03-04

## 2024-02-26 RX ORDER — TRAZODONE HYDROCHLORIDE 150 MG/1
150 TABLET ORAL
Status: DISCONTINUED | OUTPATIENT
Start: 2024-02-26 | End: 2024-03-06 | Stop reason: HOSPADM

## 2024-02-26 RX ORDER — INSULIN LISPRO 100 [IU]/ML
25 INJECTION, SOLUTION INTRAVENOUS; SUBCUTANEOUS
Status: DISCONTINUED | OUTPATIENT
Start: 2024-02-26 | End: 2024-03-03

## 2024-02-26 RX ORDER — ALBUTEROL SULFATE 2.5 MG/3ML
2.5 SOLUTION RESPIRATORY (INHALATION) EVERY 6 HOURS PRN
Status: DISCONTINUED | OUTPATIENT
Start: 2024-02-26 | End: 2024-03-06 | Stop reason: HOSPADM

## 2024-02-26 RX ORDER — ALBUTEROL SULFATE 90 UG/1
2 AEROSOL, METERED RESPIRATORY (INHALATION) EVERY 4 HOURS PRN
Status: DISCONTINUED | OUTPATIENT
Start: 2024-02-26 | End: 2024-03-06 | Stop reason: HOSPADM

## 2024-02-26 RX ORDER — ALBUTEROL SULFATE 2.5 MG/3ML
2 SOLUTION RESPIRATORY (INHALATION) ONCE
Status: COMPLETED | OUTPATIENT
Start: 2024-02-26 | End: 2024-02-26

## 2024-02-26 RX ORDER — SPIRONOLACTONE 50 MG/1
50 TABLET, FILM COATED ORAL DAILY
Qty: 30 TABLET | Refills: 0 | Status: ON HOLD | OUTPATIENT
Start: 2024-02-26 | End: 2024-03-27

## 2024-02-26 RX ORDER — INSULIN LISPRO 100 [IU]/ML
1-5 INJECTION, SOLUTION INTRAVENOUS; SUBCUTANEOUS
Status: DISCONTINUED | OUTPATIENT
Start: 2024-02-26 | End: 2024-03-06 | Stop reason: HOSPADM

## 2024-02-26 RX ORDER — LORATADINE 10 MG/1
10 TABLET ORAL DAILY PRN
Status: DISCONTINUED | OUTPATIENT
Start: 2024-02-26 | End: 2024-03-06 | Stop reason: HOSPADM

## 2024-02-26 RX ORDER — METOPROLOL SUCCINATE 50 MG/1
100 TABLET, EXTENDED RELEASE ORAL DAILY
Status: DISCONTINUED | OUTPATIENT
Start: 2024-02-27 | End: 2024-02-28

## 2024-02-26 RX ORDER — ACETAMINOPHEN 325 MG/1
650 TABLET ORAL EVERY 6 HOURS PRN
Status: DISCONTINUED | OUTPATIENT
Start: 2024-02-26 | End: 2024-03-06 | Stop reason: HOSPADM

## 2024-02-26 RX ORDER — ATORVASTATIN CALCIUM 40 MG/1
40 TABLET, FILM COATED ORAL DAILY
Status: DISCONTINUED | OUTPATIENT
Start: 2024-02-27 | End: 2024-03-06 | Stop reason: HOSPADM

## 2024-02-26 RX ORDER — INSULIN GLARGINE 100 [IU]/ML
55 INJECTION, SOLUTION SUBCUTANEOUS
Status: DISCONTINUED | OUTPATIENT
Start: 2024-02-26 | End: 2024-03-03

## 2024-02-26 RX ADMIN — ACETAMINOPHEN 650 MG: 325 TABLET, FILM COATED ORAL at 21:55

## 2024-02-26 RX ADMIN — Medication 0.5 MG/HR: at 18:25

## 2024-02-26 RX ADMIN — INSULIN LISPRO 4 UNITS: 100 INJECTION, SOLUTION INTRAVENOUS; SUBCUTANEOUS at 18:32

## 2024-02-26 RX ADMIN — INSULIN LISPRO 25 UNITS: 100 INJECTION, SOLUTION INTRAVENOUS; SUBCUTANEOUS at 18:32

## 2024-02-26 RX ADMIN — ALBUTEROL SULFATE 2 PUFF: 90 AEROSOL, METERED RESPIRATORY (INHALATION) at 18:22

## 2024-02-26 RX ADMIN — TRAZODONE HYDROCHLORIDE 150 MG: 150 TABLET ORAL at 21:36

## 2024-02-26 RX ADMIN — MONTELUKAST 10 MG: 10 TABLET, FILM COATED ORAL at 21:36

## 2024-02-26 RX ADMIN — APIXABAN 5 MG: 5 TABLET, FILM COATED ORAL at 18:31

## 2024-02-26 RX ADMIN — INSULIN GLARGINE 55 UNITS: 100 INJECTION, SOLUTION SUBCUTANEOUS at 21:36

## 2024-02-26 RX ADMIN — BUDESONIDE 0.5 MG: 0.5 INHALANT ORAL at 20:34

## 2024-02-26 NOTE — ED NOTES
Primary RN sent report to Brittany BROWNING on MS 3 via [employer].      Brandie Livingston RN  02/26/24 [id number]

## 2024-02-26 NOTE — ED NOTES
Patient attempted to provide urine sample and was unsuccessful.      Brandie Livingston RN  02/26/24 [phone]

## 2024-02-26 NOTE — ED NOTES
Patient out of bed to bedside commode with supervision.      Brandie Livingston RN  02/26/24 [id number]

## 2024-02-26 NOTE — ED PROVIDER NOTES
History  Chief Complaint   Patient presents with    Decreased Oxygen Level     Pt to er via ems from cardiologist with reports that she had a low oxygen level. Patient was placed on oxygen at the office, and ems gave breathing treatment. Pt reports feeling short of breath today before breathing treatment, but since then feels better. Chronically wear 4L NC     63-year-old female presents for evaluation of shortness of breath.  Patient was being evaluated at her cardiologist office and found to be hypoxic with significant weight gain recently.  Also with a history of COPD.  EMS administered DuoNeb treatment and route of.  Patient placed on oxygen at cardiologist office with improvement of hypoxemia.  Patient with conversational dyspnea and pursed of breathing on my evaluation.  Currently on 6 L nasal cannula oxygen saturation 90%        Prior to Admission Medications   Prescriptions Last Dose Informant Patient Reported? Taking?   Blood Glucose Monitoring Suppl (Contour Next One) AILYN  Self No No   Sig: USE AS DIRECTED 3 TIMES A DAY   Patient taking differently: USE AS DIRECTED 3 TIMES A DAY    Once a day   [employer] Lancets Thin 26G MISC  Self No No   Sig: USE 3 (THREE) TIMES A DAY   Patient taking differently: Once a day   Contour Next Test test strip  Self No No   Sig: USE TO TEST BLOOD SUGAR 3 TIMES A DAY   Patient taking differently: USE TO TEST BLOOD SUGAR 3 TIMES A DAY    Once a day   DULoxetine (CYMBALTA) 60 mg delayed release capsule 2/26/2024  Yes Yes   Sig: Take 60 mg by mouth daily   EPINEPHrine (EPIPEN) 0.3 mg/0.3 mL SOAJ   No No   Sig: Inject 0.3 mL (0.3 mg total) into a muscle once for 1 dose   Empagliflozin (JARDIANCE) 10 MG TABS tablet 2/26/2024  No Yes   Sig: Take 1 tablet (10 mg total) by mouth daily   Insulin Glargine Solostar (Lantus SoloStar) 100 UNIT/ML SOPN 2/25/2024  No Yes   Sig: Inject 0.58 mL (58 Units total) as directed daily at bedtime Inject 55 units daily at bedtime   Insulin Pen Needle (BD  Pen Needle Love 2nd Gen) 32G X 4 MM MISC  Self No No   Sig: Use daily at bedtime Use 4 new needles daily .   LORazepam (ATIVAN) 0.5 mg tablet  Self No No   Sig: TAKE 2 TABLETS BY MOUTH AT BEDTIME AND 1 EVERY 6 HOURS AS NEEDED FOR ANXIETY   albuterol (2.5 mg/3 mL) 0.083 % nebulizer solution 2024 Self No Yes   Sig: Take 3 mL (2.5 mg total) by nebulization every 6 (six) hours as needed for wheezing or shortness of breath   albuterol (PROVENTIL HFA,VENTOLIN HFA) 90 mcg/act inhaler  Self No No   Sig: Inhale 2 puffs every 4 (four) hours as needed for wheezing   apixaban (Eliquis) 5 mg 2024 Self No Yes   Sig: Take 1 tablet (5 mg total) by mouth 2 (two) times a day   atorvastatin (LIPITOR) 40 mg tablet 2024 Self No Yes   Sig: Take 1 tablet (40 mg total) by mouth daily   budesonide (PULMICORT) 0.5 mg/2 mL nebulizer solution Not Taking Self No No   Sig: TAKE 2 ML (0.5 MG TOTAL) BY NEBULIZATION TWICE A DAY RINSE MOUTH AFTER USE   Patient not taking: Reported on 2024   bumetanide (BUMEX) 2 mg tablet 2024  No Yes   Sig: Take 3 tablets (6 mg total) by mouth 2 (two) times a day   escitalopram (LEXAPRO) 20 mg tablet  Self No No   Sig: TAKE 1 TABLET BY MOUTH EVERY DAY   Patient not taking: Reported on 2024   ferrous sulfate 220 (44 Fe) mg/5 mL solution  Self No No   Sig: TAKE 5 ML (220 MG TOTAL) BY MOUTH 2 (TWO) TIMES A DAY BEFORE MEALS   Patient not taking: Reported on 2024   fluticasone (FLONASE) 50 mcg/act nasal spray  Self No No   Si spray into each nostril 2 (two) times a day   Patient not taking: Reported on 2024   glycopyrrolate-formoterol (BEVESPI AEROSPHERE) 9-4.8 MCG/ACT inhaler   No No   Sig: Inhale 2 puffs 2 (two) times a day   insulin glulisine (Apidra SoloStar) 100 units/mL injection pen 2024  No Yes   Sig: Inject 25 Units under the skin 3 (three) times a day with meals 25 units 3 times a day with meals   levalbuterol (XOPENEX) 1.25 mg/0.5 mL nebulizer solution  Self No  No   Sig: Take 0.5 mL (1.25 mg total) by nebulization 2 (two) times a day   Patient not taking: Reported on 2/26/2024   loratadine (CLARITIN) 10 mg tablet 2/26/2024  No Yes   Sig: Take 1 tablet (10 mg total) by mouth daily as needed for allergies   metoprolol succinate (TOPROL-XL) 100 mg 24 hr tablet 2/26/2024 Self No Yes   Sig: Take 1 tablet (100 mg total) by mouth daily   montelukast (SINGULAIR) 10 mg tablet 2/26/2024 Self No Yes   Sig: TAKE 1 TABLET BY MOUTH EVERYDAY AT BEDTIME   naloxone (NARCAN) 4 mg/0.1 mL nasal spray  Self No No   Sig: Administer 1 spray into a nostril. If breathing does not return to normal or if breathing difficulty resumes after 2-3 minutes, give another dose in the other nostril using a new spray.   nystatin (MYCOSTATIN) powder   No No   Sig: Apply topically 2 (two) times a day   Patient not taking: Reported on 2/26/2024   omeprazole (PriLOSEC) 40 MG capsule 2/26/2024  No Yes   Sig: Take 1 capsule (40 mg total) by mouth daily   potassium chloride (Klor-Con M20) 20 mEq tablet 2/26/2024  No Yes   Sig: Take 2 tablets (40 mEq total) by mouth daily   spironolactone (ALDACTONE) 50 mg tablet 2/26/2024  No Yes   Sig: Take 1 tablet (50 mg total) by mouth daily   traZODone (DESYREL) 150 mg tablet 2/25/2024  No Yes   Sig: TAKE 1 TABLET BY MOUTH EVERYDAY AT BEDTIME      Facility-Administered Medications: None       Past Medical History:   Diagnosis Date    Acute blood loss anemia 06/01/2021    Acute diverticulitis 05/02/2021    Acute on chronic diastolic congestive heart failure (HCC) 05/21/2020    Acute on chronic respiratory failure with hypoxia (HCC) 11/30/2018    Alcohol abuse 05/21/2020    Anemia     Asthma with COPD with exacerbation  11/12/2020    Atrial fibrillation (HCC)     Cervical radiculopathy     CHF (congestive heart failure) (HCC)     Chronic low back pain     Chronic obstructive asthma 02/20/2018    Cigarette nicotine dependence without complication 11/20/2019    Quit 12/10/2019.      Community acquired pneumonia 2020    COPD exacerbation (HCC) 2020    Depression with anxiety 2014    Diabetes mellitus with hyperglycemia (HCC)     Diverticulitis 2021    Elevated liver enzymes     GERD (gastroesophageal reflux disease)     Hypersomnolence 10/28/2020    Hypokalemia 2018    Lower gastrointestinal bleeding 2021    Paresthesia of upper extremity     Plantar fasciitis     Restless legs syndrome 2014    Severe persistent asthma with exacerbation 2018    PFTs 2018:  FEV1 0.87 L-35% predicted, 27% improvement post-bronchodilator.  DLCO-82% predicted,  The patient has been having worsening of her symptoms now for few months, especially  from 2020, also she had multiple exacerbations last year and most recently required a steroid burst and August  Reviewing her CT scan  New PFTs with severe obstruction reviewed  Hypersensitivity p    Sexual dysfunction     Sleep apnea, obstructive     Tenosynovitis of finger     Tinea corporis     Tobacco use 2018    Currently smoking 3-4 cigarettes daily    Trigger middle finger of left hand 2017    Trigger ring finger of left hand 2017    Weakness of upper extremity        Past Surgical History:   Procedure Laterality Date    ABDOMINAL SURGERY      CARPAL TUNNEL RELEASE Bilateral      SECTION      CHOLECYSTECTOMY      laparoscopic    ESOPHAGOGASTRODUODENOSCOPY N/A 12/3/2018    Procedure: ESOPHAGOGASTRODUODENOSCOPY (EGD) AND COLONOSCOPY;  Surgeon: Ludmila Perry MD;  Location: [facility];  Service: Gastroenterology    GASTRIC BYPASS      for morbid obesity with gilda en y    HERNIA REPAIR      HYSTERECTOMY      TN EXCISION GANGLION WRIST DORSAL/VOLAR PRIMARY Left 2017    Procedure: LONG FINGER GANGLION CYST EXCISION;  Surgeon: Philippe Clark MD;  Location: [facility];  Service: Orthopedics    TN TENDON SHEATH INCISION Left 2017    Procedure: THUMB, LONG, RING, TRIGGER  FINGER RELEASE;  Surgeon: Philippe Clark MD;  Location: [facility] OR;  Service: Orthopedics    SHOULDER SURGERY Right        Family History   Problem Relation Age of Onset    Alzheimer's disease Mother     Atrial fibrillation Mother     Dementia Mother     Heart disease Mother         heart problem    Seizures Mother     Parkinsonism Father     Arthritis Father     Atrial fibrillation Father     No Known Problems Daughter     Cri-du-chat syndrome Daughter     Colon cancer Maternal Grandmother 80    Diabetes type II Maternal Grandmother     No Known Problems Brother     No Known Problems Son     Substance Abuse Neg Hx         mother,father    Mental illness Neg Hx         mother,father    Colon polyps Neg Hx      I have reviewed and agree with the history as documented.    E-Cigarette/Vaping    E-Cigarette Use Never User      E-Cigarette/Vaping Substances    Nicotine No     THC No     CBD No     Flavoring No     Other No     Unknown No      Social History     Tobacco Use    Smoking status: Former     Current packs/day: 0.00     Average packs/day: 0.3 packs/day for 29.9 years (7.5 ttl pk-yrs)     Types: Cigarettes     Start date:      Quit date: 12/10/2019     Years since quittin.2    Smokeless tobacco: Never   Vaping Use    Vaping status: Never Used   Substance Use Topics    Alcohol use: Not Currently     Alcohol/week: 20.0 standard drinks of alcohol     Types: 20 Cans of beer per week     Comment: about 6 coors light every night, stopped in 2019    Drug use: No       Review of Systems   Respiratory:  Positive for shortness of breath.        Physical Exam  Physical Exam  Vitals and nursing note reviewed.   Constitutional:       Appearance: She is well-developed.   HENT:      Head: Normocephalic and atraumatic.      Right Ear: External ear normal.      Left Ear: External ear normal.      Nose: Nose normal.   Eyes:      General: No scleral icterus.  Cardiovascular:      Rate and Rhythm: Tachycardia present.    Pulmonary:      Effort: Pulmonary effort is normal. No respiratory distress.      Comments: Diminished breath sounds  Abdominal:      General: There is no distension.   Musculoskeletal:         General: No deformity. Normal range of motion.      Cervical back: Normal range of motion.   Skin:     Findings: No rash.   Neurological:      General: No focal deficit present.      Mental Status: She is alert and oriented to person, place, and time.   Psychiatric:         Mood and Affect: Mood normal.         Vital Signs  ED Triage Vitals   Temperature Pulse Respirations Blood Pressure SpO2   02/26/24 1350 02/26/24 1349 02/26/24 1349 02/26/24 1351 02/26/24 1349   98.7 °F (37.1 °C) (!) 131 20 117/65 (!) 88 %      Temp Source Heart Rate Source Patient Position - Orthostatic VS BP Location FiO2 (%)   02/26/24 1350 02/26/24 1349 02/26/24 1351 02/26/24 1351 02/26/24 1426   Temporal Monitor Lying Left arm 35      Pain Score       02/26/24 1834       8           Vitals:    02/26/24 1530 02/26/24 1700 02/26/24 1730 02/26/24 1820   BP: 114/60 110/58 109/56 111/53   Pulse: 104 102 102 (!) 106   Patient Position - Orthostatic VS:  Sitting           Visual Acuity      ED Medications  Medications   albuterol (PROVENTIL HFA,VENTOLIN HFA) inhaler 2 puff (2 puffs Inhalation Given 2/26/24 1822)   apixaban (ELIQUIS) tablet 5 mg (5 mg Oral Given 2/26/24 1831)   atorvastatin (LIPITOR) tablet 40 mg (has no administration in time range)   DULoxetine (CYMBALTA) delayed release capsule 60 mg (has no administration in time range)   Empagliflozin (JARDIANCE) tablet 10 mg (has no administration in time range)   fluticasone (FLONASE) 50 mcg/act nasal spray 1 spray (has no administration in time range)   loratadine (CLARITIN) tablet 10 mg (has no administration in time range)   LORazepam (ATIVAN) tablet 0.5 mg (has no administration in time range)   metoprolol succinate (TOPROL-XL) 24 hr tablet 100 mg (has no administration in time range)    montelukast (SINGULAIR) tablet 10 mg (has no administration in time range)   pantoprazole (PROTONIX) EC tablet 40 mg (has no administration in time range)   spironolactone (ALDACTONE) tablet 50 mg (has no administration in time range)   traZODone (DESYREL) tablet 150 mg (has no administration in time range)   bumetanide (BUMEX) 12.5 mg infusion 50 mL (0.5 mg/hr Intravenous New Bag 2/26/24 1825)   insulin lispro (HumaLOG) 100 units/mL subcutaneous injection 25 Units (25 Units Subcutaneous Given 2/26/24 1832)   insulin glargine (LANTUS) subcutaneous injection 55 Units 0.55 mL (has no administration in time range)   insulin lispro (HumaLOG) 100 units/mL subcutaneous injection 2-12 Units (4 Units Subcutaneous Given 2/26/24 1832)   insulin lispro (HumaLOG) 100 units/mL subcutaneous injection 1-5 Units (has no administration in time range)   albuterol (FOR EMS ONLY) (2.5 mg/3 mL) 0.083 % inhalation solution 5 mg (0 mg Does not apply Given to EMS 2/26/24 1356)       Diagnostic Studies  Results Reviewed       Procedure Component Value Units Date/Time    HS Troponin I 2hr [[id number]]  (Normal) Collected: 02/26/24 1648    Lab Status: Final result Specimen: Blood from Arm, Right Updated: 02/26/24 1718     hs TnI 2hr 5 ng/L      Delta 2hr hsTnI 1 ng/L     Lactic acid 2 Hours [[id number]]  (Normal) Collected: 02/26/24 1648    Lab Status: Final result Specimen: Blood from Arm, Right Updated: 02/26/24 1709     LACTIC ACID 0.8 mmol/L     Narrative:      Result may be elevated if tourniquet was used during collection.    HS Troponin I 4hr [[id number]]     Lab Status: No result Specimen: Blood     FLU/RSV/COVID - if FLU/RSV clinically relevant [[id number]]  (Normal) Collected: 02/26/24 1408    Lab Status: Final result Specimen: Nares from Nose Updated: 02/26/24 1501     SARS-CoV-2 Negative     INFLUENZA A PCR Negative     INFLUENZA B PCR Negative     RSV PCR Negative    Narrative:      FOR PEDIATRIC PATIENTS - copy/paste COVID  Guidelines URL to browser: [email or web address]    SARS-CoV-2 assay is a Nucleic Acid Amplification assay intended for the  qualitative detection of nucleic acid from SARS-CoV-2 in nasopharyngeal  swabs. Results are for the presumptive identification of SARS-CoV-2 RNA.    Positive results are indicative of infection with SARS-CoV-2, the virus  causing COVID-19, but do not rule out bacterial infection or co-infection  with other viruses. Laboratories within the [place] and its  territories are required to report all positive results to the appropriate  public health authorities. Negative results do not preclude SARS-CoV-2  infection and should not be used as the sole basis for treatment or other  patient management decisions. Negative results must be combined with  clinical observations, patient history, and epidemiological information.  This test has not been FDA cleared or approved.    This test has been authorized by FDA under an Emergency Use Authorization  (EUA). This test is only authorized for the duration of time the  declaration that circumstances exist justifying the authorization of the  emergency use of an in vitro diagnostic tests for detection of SARS-CoV-2  virus and/or diagnosis of COVID-19 infection under section 564(b)(1) of  the Act, 21 U.S.C. 360bbb-3(b)(1), unless the authorization is terminated  or revoked sooner. The test has been validated but independent review by FDA  and CLIA is pending.    Test performed using [employer] GeneXpert: This RT-PCR assay targets N2,  a region unique to SARS-CoV-2. A conserved region in the E-gene was chosen  for pan-Sarbecovirus detection which includes SARS-CoV-2.    According to CMS-2020-01-R, this platform meets the definition of high-throughput technology.    Protime-INR [[id number]]  (Abnormal) Collected: 02/26/24 1408    Lab Status: Final result Specimen: Blood from Arm, Right Updated: 02/26/24 6801      Protime 16.9 seconds      INR 1.33    APTT [[id number]]  (Normal) Collected: 02/26/24 1408    Lab Status: Final result Specimen: Blood from Arm, Right Updated: 02/26/24 1455     PTT 32 seconds     B-Type Natriuretic Peptide(BNP) [[id number]]  (Abnormal) Collected: 02/26/24 1408    Lab Status: Final result Specimen: Blood from Arm, Right Updated: 02/26/24 1453      pg/mL     Procalcitonin [[id number]]  (Normal) Collected: 02/26/24 1408    Lab Status: Final result Specimen: Blood from Arm, Right Updated: 02/26/24 1452     Procalcitonin 0.06 ng/ml     Comprehensive metabolic panel [[id number]]  (Abnormal) Collected: 02/26/24 1408    Lab Status: Final result Specimen: Blood from Arm, Right Updated: 02/26/24 1445     Sodium 139 mmol/L      Potassium 3.6 mmol/L      Chloride 94 mmol/L      CO2 37 mmol/L      ANION GAP 8 mmol/L      BUN 16 mg/dL      Creatinine 0.72 mg/dL      Glucose 83 mg/dL      Calcium 9.3 mg/dL      AST 20 U/L      ALT 26 U/L      Alkaline Phosphatase 147 U/L      Total Protein 6.9 g/dL      Albumin 4.1 g/dL      Total Bilirubin 0.39 mg/dL      eGFR 88 ml/min/1.73sq m     Narrative:      National Kidney Disease Foundation guidelines for Chronic Kidney Disease (CKD):     Stage 1 with normal or high GFR (GFR > 90 mL/min/1.73 square meters)    Stage 2 Mild CKD (GFR = 60-89 mL/min/1.73 square meters)    Stage 3A Moderate CKD (GFR = 45-59 mL/min/1.73 square meters)    Stage 3B Moderate CKD (GFR = 30-44 mL/min/1.73 square meters)    Stage 4 Severe CKD (GFR = 15-29 mL/min/1.73 square meters)    Stage 5 End Stage CKD (GFR <15 mL/min/1.73 square meters)  Note: GFR calculation is accurate only with a steady state creatinine    HS Troponin 0hr (reflex protocol) [[id number]]  (Normal) Collected: 02/26/24 1408    Lab Status: Final result Specimen: Blood from Arm, Right Updated: 02/26/24 1445     hs TnI 0hr 4 ng/L     Lactic acid [[id number]]  (Abnormal) Collected: 02/26/24 1408    Lab Status: Final result  Specimen: Blood from Arm, Right Updated: 02/26/24 1441     LACTIC ACID 2.3 mmol/L     Narrative:      Result may be elevated if tourniquet was used during collection.    POCT Blood Gas (CG8+) [[id number]]  (Abnormal) Collected: 02/26/24 1423    Lab Status: Final result Specimen: Venous Updated: 02/26/24 1427     ph, Brian ISTAT 7.667     pCO2, Brian i-STAT 27.9 mm HG      pO2, Brian i-STAT 121.0 mm HG      BE, i-STAT 12 mmol/L      HCO3, Brian i-STAT 32.0 mmol/L      CO2, i-STAT 33 mmol/L      O2 Sat, i-STAT 99 %      SODIUM, I-STAT 135 mmol/l      Potassium, i-STAT 3.9 mmol/L      Calcium, Ionized i-STAT 0.86 mmol/L      Hct, i-STAT 35 %      Hgb, i-STAT 11.9 g/dl      Glucose, i-STAT 87 mg/dl      Specimen Type VENOUS    Blood culture #1 [[id number]] Collected: 02/26/24 1419    Lab Status: In process Specimen: Blood from Arm, Left Updated: 02/26/24 1425    CBC and differential [[id number]]  (Abnormal) Collected: 02/26/24 1408    Lab Status: Final result Specimen: Blood from Arm, Right Updated: 02/26/24 1424     WBC 12.18 Thousand/uL      RBC 5.12 Million/uL      Hemoglobin 9.8 g/dL      Hematocrit 35.1 %      MCV 69 fL      MCH 19.1 pg      MCHC 27.9 g/dL      RDW 18.7 %      MPV 11.5 fL      Platelets 301 Thousands/uL      nRBC 0 /100 WBCs      Neutrophils Relative 73 %      Immat GRANS % 1 %      Lymphocytes Relative 20 %      Monocytes Relative 6 %      Eosinophils Relative 0 %      Basophils Relative 0 %      Neutrophils Absolute 8.86 Thousands/µL      Immature Grans Absolute 0.07 Thousand/uL      Lymphocytes Absolute 2.45 Thousands/µL      Monocytes Absolute 0.78 Thousand/µL      Eosinophils Absolute 0.00 Thousand/µL      Basophils Absolute 0.02 Thousands/µL     Blood culture #2 [[id number]] Collected: 02/26/24 1408    Lab Status: In process Specimen: Blood from Arm, Right Updated: 02/26/24 1420    UA w Reflex to Microscopic w Reflex to Culture [[id number]]     Lab Status: No result Specimen: Urine                    XR  chest portable   Final Result by Kb Rich MD (02/26 1723)      Stable cardiomegaly and central pulmonary vascular congestion            Workstation performed: [id number]                    Procedures  CriticalCare Time    Date/Time: 2/26/2024 6:47 PM    Performed by: Esequiel Robbins DO  Authorized by: Esequiel Robbins DO    Critical care provider statement:     Critical care time (minutes):  35    Critical care time was exclusive of:  Separately billable procedures and treating other patients and teaching time    Critical care was necessary to treat or prevent imminent or life-threatening deterioration of the following conditions:  Respiratory failure    Critical care was time spent personally by me on the following activities:  Blood draw for specimens, obtaining history from patient or surrogate, development of treatment plan with patient or surrogate, discussions with consultants, discussions with primary provider, evaluation of patient's response to treatment, examination of patient, ordering and performing treatments and interventions, ordering and review of laboratory studies, ordering and review of radiographic studies, re-evaluation of patient's condition and review of old charts    I assumed direction of critical care for this patient from another provider in my specialty: no             ED Course                               SBIRT 20yo+      Flowsheet Row Most Recent Value   Initial Alcohol Screen: US AUDIT-C     1. How often do you have a drink containing alcohol? 0 Filed at: 02/26/2024 1348   2. How many drinks containing alcohol do you have on a typical day you are drinking?  0 Filed at: 02/26/2024 1348   3a. Male UNDER 65: How often do you have five or more drinks on one occasion? 0 Filed at: 02/26/2024 1348   3b. FEMALE Any Age, or MALE 65+: How often do you have 4 or more drinks on one occassion? 0 Filed at: 02/26/2024 1348   Audit-C Score 0 Filed at: 02/26/2024 1348   JEIMY: How many times in the  past year have you...    Used an illegal drug or used a prescription medication for non-medical reasons? Never Filed at: 02/26/2024 9688                      Medical Decision Making  63-year-old female presenting with shortness of breath concerning for CHF versus COPD exacerbation.  Given tachycardia and hypoxia obtain cardiopulmonary/sepsis evaluation.  Will initiate BiPAP given patient's work of breathing with plan to de-escalate.  Patient will require admission.    Amount and/or Complexity of Data Reviewed  Labs: ordered.  Radiology: ordered.    Risk  Decision regarding hospitalization.             Disposition  Final diagnoses:   Acute exacerbation of CHF (congestive heart failure) ([facility])     Time reflects when diagnosis was documented in both MDM as applicable and the Disposition within this note       Time User Action Codes Description Comment    2/26/2024  3:39 PM Esequiel Robbins Add [I50.9] Acute exacerbation of CHF (congestive heart failure) ([facility])     2/26/2024  5:35 PM Magdalena Fleming [J44.9] COPD, severe ([facility])           ED Disposition       ED Disposition   Admit    Condition   Stable    Date/Time   Mon Feb 26, 2024 0455    Comment   Case was discussed with [facility] and the patient's admission status was agreed to be Admission Status: inpatient status to the service of Dr. Fleming .               Follow-up Information    None         Current Discharge Medication List        CONTINUE these medications which have NOT CHANGED    Details   albuterol (2.5 mg/3 mL) 0.083 % nebulizer solution Take 3 mL (2.5 mg total) by nebulization every 6 (six) hours as needed for wheezing or shortness of breath  Qty: 1080 mL, Refills: 3    Associated Diagnoses: COPD, severe ([facility])      apixaban (Eliquis) 5 mg Take 1 tablet (5 mg total) by mouth 2 (two) times a day  Qty: 60 tablet, Refills: 2    Associated Diagnoses: Atrial fibrillation, unspecified type ([facility])      atorvastatin (LIPITOR) 40 mg tablet Take 1 tablet (40 mg  total) by mouth daily  Qty: 90 tablet, Refills: 3    Associated Diagnoses: Diabetes mellitus type 2 in obese       bumetanide (BUMEX) 2 mg tablet Take 3 tablets (6 mg total) by mouth 2 (two) times a day  Qty: 180 tablet, Refills: 0    Associated Diagnoses: Acute exacerbation of CHF (congestive heart failure) ([facility])      DULoxetine (CYMBALTA) 60 mg delayed release capsule Take 60 mg by mouth daily      Empagliflozin (JARDIANCE) 10 MG TABS tablet Take 1 tablet (10 mg total) by mouth daily  Qty: 60 tablet, Refills: 0    Associated Diagnoses: COPD with acute exacerbation ([facility]); Acute on chronic diastolic heart failure ([facility])      Insulin Glargine Solostar (Lantus SoloStar) 100 UNIT/ML SOPN Inject 0.58 mL (58 Units total) as directed daily at bedtime Inject 55 units daily at bedtime  Qty: 15 mL, Refills: 0    Comments: Please hold till patient calls  Associated Diagnoses: Type 2 diabetes mellitus with stage 2 chronic kidney disease, with long-term current use of insulin ([facility]); Type 2 diabetes mellitus with stage 2 chronic kidney disease, without long-term current use of insulin       insulin glulisine (Apidra SoloStar) 100 units/mL injection pen Inject 25 Units under the skin 3 (three) times a day with meals 25 units 3 times a day with meals  Qty: 15 mL, Refills: 0    Comments: Please hold till patient calls  Associated Diagnoses: Type 2 diabetes mellitus with stage 2 chronic kidney disease, with long-term current use of insulin ([facility])      loratadine (CLARITIN) 10 mg tablet Take 1 tablet (10 mg total) by mouth daily as needed for allergies  Qty: 30 tablet, Refills: 0    Associated Diagnoses: Type 2 diabetes mellitus with stage 2 chronic kidney disease, without long-term current use of insulin       metoprolol succinate (TOPROL-XL) 100 mg 24 hr tablet Take 1 tablet (100 mg total) by mouth daily  Qty: 90 tablet, Refills: 3    Associated Diagnoses: Essential hypertension      montelukast (SINGULAIR) 10 mg tablet TAKE 1 TABLET  BY MOUTH EVERYDAY AT BEDTIME  Qty: 90 tablet, Refills: 3    Associated Diagnoses: Severe persistent asthma with acute exacerbation      omeprazole (PriLOSEC) 40 MG capsule Take 1 capsule (40 mg total) by mouth daily  Qty: 90 capsule, Refills: 3    Associated Diagnoses: Gastroesophageal reflux disease without esophagitis      potassium chloride (Klor-Con M20) 20 mEq tablet Take 2 tablets (40 mEq total) by mouth daily  Qty: 180 tablet, Refills: 3    Associated Diagnoses: Acute on chronic respiratory failure with hypoxia (HCC)      spironolactone (ALDACTONE) 50 mg tablet Take 1 tablet (50 mg total) by mouth daily  Qty: 30 tablet, Refills: 0    Associated Diagnoses: COPD with acute exacerbation (HCC); Acute on chronic diastolic heart failure (HCC)      traZODone (DESYREL) 150 mg tablet TAKE 1 TABLET BY MOUTH EVERYDAY AT BEDTIME  Qty: 90 tablet, Refills: 0    Associated Diagnoses: Insomnia, unspecified type      albuterol (PROVENTIL HFA,VENTOLIN HFA) 90 mcg/act inhaler Inhale 2 puffs every 4 (four) hours as needed for wheezing  Qty: 8.5 g, Refills: 5    Comments: Substitution to a formulary equivalent within the same pharmaceutical class is authorized.  Associated Diagnoses: Asthma-COPD overlap syndrome      Blood Glucose Monitoring Suppl (Contour Next One) IALYN USE AS DIRECTED 3 TIMES A DAY  Qty: 1 each, Refills: 0    Associated Diagnoses: Type 2 diabetes mellitus with hyperglycemia, unspecified whether long term insulin use (HCC)      budesonide (PULMICORT) 0.5 mg/2 mL nebulizer solution TAKE 2 ML (0.5 MG TOTAL) BY NEBULIZATION TWICE A DAY RINSE MOUTH AFTER USE  Qty: 60 mL, Refills: 3    Associated Diagnoses: Asthma-COPD overlap syndrome      Contour Next Test test strip USE TO TEST BLOOD SUGAR 3 TIMES A DAY  Qty: 100 strip, Refills: 3    Associated Diagnoses: Type 2 diabetes mellitus with hyperglycemia, unspecified whether long term insulin use (HCC)      [employer] Lancets Thin 26G MISC USE 3 (THREE) TIMES A DAY  Qty: 100  each, Refills: 5    Associated Diagnoses: Type 2 diabetes mellitus with hyperglycemia, unspecified whether long term insulin use ([facility])      EPINEPHrine (EPIPEN) 0.3 mg/0.3 mL SOAJ Inject 0.3 mL (0.3 mg total) into a muscle once for 1 dose  Qty: 0.6 mL, Refills: 0    Associated Diagnoses: Eosinophilic asthma; Asthma-COPD overlap syndrome      escitalopram (LEXAPRO) 20 mg tablet TAKE 1 TABLET BY MOUTH EVERY DAY  Qty: 90 tablet, Refills: 0    Associated Diagnoses: Current moderate episode of major depressive disorder without prior episode ([facility])      ferrous sulfate 220 (44 Fe) mg/5 mL solution TAKE 5 ML (220 MG TOTAL) BY MOUTH 2 (TWO) TIMES A DAY BEFORE MEALS  Qty: 473 mL, Refills: 2    Associated Diagnoses: Other iron deficiency anemia      fluticasone (FLONASE) 50 mcg/act nasal spray 1 spray into each nostril 2 (two) times a day  Qty: 1 Bottle, Refills: 3    Associated Diagnoses: Severe persistent asthma with acute exacerbation      glycopyrrolate-formoterol (BEVESPI AEROSPHERE) 9-4.8 MCG/ACT inhaler Inhale 2 puffs 2 (two) times a day  Qty: 10.7 g, Refills: 5    Associated Diagnoses: Asthma-COPD overlap syndrome      Insulin Pen Needle (BD Pen Needle Love 2nd Gen) 32G X 4 MM MISC Use daily at bedtime Use 4 new needles daily .  Qty: 400 each, Refills: 3    Comments: DX Code:E11.22, Please hold till patient calls  Associated Diagnoses: Tinea corporis; Type 2 diabetes mellitus with stage 2 chronic kidney disease, with long-term current use of insulin ([facility])      levalbuterol (XOPENEX) 1.25 mg/0.5 mL nebulizer solution Take 0.5 mL (1.25 mg total) by nebulization 2 (two) times a day  Qty: 60 vial, Refills: 0    Associated Diagnoses: Asthma with COPD with exacerbation       LORazepam (ATIVAN) 0.5 mg tablet TAKE 2 TABLETS BY MOUTH AT BEDTIME AND 1 EVERY 6 HOURS AS NEEDED FOR ANXIETY  Qty: 90 tablet, Refills: 0    Associated Diagnoses: Encounter for long-term (current) drug use      naloxone (NARCAN) 4 mg/0.1 mL nasal spray  Administer 1 spray into a nostril. If breathing does not return to normal or if breathing difficulty resumes after 2-3 minutes, give another dose in the other nostril using a new spray.  Qty: 1 each, Refills: 1    Associated Diagnoses: Other chronic pain      nystatin (MYCOSTATIN) powder Apply topically 2 (two) times a day  Qty: 30 g, Refills: 0    Associated Diagnoses: COPD with acute exacerbation (HCC); Acute on chronic diastolic heart failure (HCC)             No discharge procedures on file.    PDMP Review         Value Time User    PDMP Reviewed  Yes 2/9/2024 10:20 AM Hawa Marsh MD            ED Provider  Electronically Signed by             Esequiel Robbins DO  02/26/24 1912

## 2024-02-26 NOTE — ED NOTES
"Patient removed BIPAP and placed machine in standby by herself. Patient states \"the doctor said I could take it off whenever I want\". Provider aware.      Brandie Livingston RN  02/26/24 0881    "

## 2024-02-26 NOTE — TELEPHONE ENCOUNTER
I would like to help, and make adjustments to her insulin, but I cannot do that without more blood sugar readings.  That being said her average blood sugar last office visit was over 300 so the random blood sugars may actually be doing better than what she has in the past, and I am not surprised about the high blood sugar after the chicken noodle soup.  Again I need more blood sugar numbers throughout the day to make an appropriate adjustment to her medication.

## 2024-02-27 ENCOUNTER — TELEPHONE (OUTPATIENT)
Dept: FAMILY MEDICINE CLINIC | Facility: HOSPITAL | Age: 64
End: 2024-02-27

## 2024-02-27 LAB
ANION GAP SERPL CALCULATED.3IONS-SCNC: 9 MMOL/L
BASOPHILS # BLD AUTO: 0.03 THOUSANDS/ÂΜL (ref 0–0.1)
BASOPHILS NFR BLD AUTO: 0 % (ref 0–1)
BUN SERPL-MCNC: 16 MG/DL (ref 5–25)
CALCIUM SERPL-MCNC: 8.3 MG/DL (ref 8.4–10.2)
CHLORIDE SERPL-SCNC: 94 MMOL/L (ref 96–108)
CO2 SERPL-SCNC: 36 MMOL/L (ref 21–32)
CREAT SERPL-MCNC: 0.67 MG/DL (ref 0.6–1.3)
EOSINOPHIL # BLD AUTO: 0 THOUSAND/ÂΜL (ref 0–0.61)
EOSINOPHIL NFR BLD AUTO: 0 % (ref 0–6)
ERYTHROCYTE [DISTWIDTH] IN BLOOD BY AUTOMATED COUNT: 18.9 % (ref 11.6–15.1)
GFR SERPL CREATININE-BSD FRML MDRD: 92 ML/MIN/1.73SQ M
GLUCOSE SERPL-MCNC: 103 MG/DL (ref 65–140)
GLUCOSE SERPL-MCNC: 123 MG/DL (ref 65–140)
GLUCOSE SERPL-MCNC: 127 MG/DL (ref 65–140)
GLUCOSE SERPL-MCNC: 142 MG/DL (ref 65–140)
GLUCOSE SERPL-MCNC: 153 MG/DL (ref 65–140)
HCT VFR BLD AUTO: 33.3 % (ref 34.8–46.1)
HGB BLD-MCNC: 9.3 G/DL (ref 11.5–15.4)
IMM GRANULOCYTES # BLD AUTO: 0.06 THOUSAND/UL (ref 0–0.2)
IMM GRANULOCYTES NFR BLD AUTO: 1 % (ref 0–2)
LYMPHOCYTES # BLD AUTO: 2.09 THOUSANDS/ÂΜL (ref 0.6–4.47)
LYMPHOCYTES NFR BLD AUTO: 19 % (ref 14–44)
MCH RBC QN AUTO: 19.1 PG (ref 26.8–34.3)
MCHC RBC AUTO-ENTMCNC: 27.9 G/DL (ref 31.4–37.4)
MCV RBC AUTO: 68 FL (ref 82–98)
MONOCYTES # BLD AUTO: 0.95 THOUSAND/ÂΜL (ref 0.17–1.22)
MONOCYTES NFR BLD AUTO: 9 % (ref 4–12)
NEUTROPHILS # BLD AUTO: 7.91 THOUSANDS/ÂΜL (ref 1.85–7.62)
NEUTS SEG NFR BLD AUTO: 71 % (ref 43–75)
NRBC BLD AUTO-RTO: 0 /100 WBCS
PLATELET # BLD AUTO: 263 THOUSANDS/UL (ref 149–390)
PMV BLD AUTO: 11.2 FL (ref 8.9–12.7)
POTASSIUM SERPL-SCNC: 3.3 MMOL/L (ref 3.5–5.3)
RBC # BLD AUTO: 4.88 MILLION/UL (ref 3.81–5.12)
SODIUM SERPL-SCNC: 139 MMOL/L (ref 135–147)
WBC # BLD AUTO: 11.04 THOUSAND/UL (ref 4.31–10.16)

## 2024-02-27 PROCEDURE — 82948 REAGENT STRIP/BLOOD GLUCOSE: CPT

## 2024-02-27 PROCEDURE — 99232 SBSQ HOSP IP/OBS MODERATE 35: CPT | Performed by: INTERNAL MEDICINE

## 2024-02-27 PROCEDURE — 85025 COMPLETE CBC W/AUTO DIFF WBC: CPT | Performed by: INTERNAL MEDICINE

## 2024-02-27 PROCEDURE — 94660 CPAP INITIATION&MGMT: CPT

## 2024-02-27 PROCEDURE — 94640 AIRWAY INHALATION TREATMENT: CPT

## 2024-02-27 PROCEDURE — 94760 N-INVAS EAR/PLS OXIMETRY 1: CPT

## 2024-02-27 PROCEDURE — 80048 BASIC METABOLIC PNL TOTAL CA: CPT | Performed by: INTERNAL MEDICINE

## 2024-02-27 PROCEDURE — 99223 1ST HOSP IP/OBS HIGH 75: CPT | Performed by: INTERNAL MEDICINE

## 2024-02-27 RX ORDER — POTASSIUM CHLORIDE 20 MEQ/1
40 TABLET, EXTENDED RELEASE ORAL ONCE
Qty: 2 TABLET | Refills: 0 | Status: COMPLETED | OUTPATIENT
Start: 2024-02-27 | End: 2024-02-27

## 2024-02-27 RX ADMIN — PANTOPRAZOLE SODIUM 40 MG: 40 TABLET, DELAYED RELEASE ORAL at 05:30

## 2024-02-27 RX ADMIN — INSULIN LISPRO 25 UNITS: 100 INJECTION, SOLUTION INTRAVENOUS; SUBCUTANEOUS at 12:38

## 2024-02-27 RX ADMIN — INSULIN LISPRO 25 UNITS: 100 INJECTION, SOLUTION INTRAVENOUS; SUBCUTANEOUS at 16:58

## 2024-02-27 RX ADMIN — MONTELUKAST 10 MG: 10 TABLET, FILM COATED ORAL at 20:40

## 2024-02-27 RX ADMIN — Medication 0.5 MG/HR: at 21:18

## 2024-02-27 RX ADMIN — INSULIN GLARGINE 55 UNITS: 100 INJECTION, SOLUTION SUBCUTANEOUS at 20:39

## 2024-02-27 RX ADMIN — APIXABAN 5 MG: 5 TABLET, FILM COATED ORAL at 08:03

## 2024-02-27 RX ADMIN — INSULIN LISPRO 25 UNITS: 100 INJECTION, SOLUTION INTRAVENOUS; SUBCUTANEOUS at 08:03

## 2024-02-27 RX ADMIN — EMPAGLIFLOZIN 10 MG: 10 TABLET, FILM COATED ORAL at 08:06

## 2024-02-27 RX ADMIN — ACETAMINOPHEN 650 MG: 325 TABLET, FILM COATED ORAL at 07:22

## 2024-02-27 RX ADMIN — APIXABAN 5 MG: 5 TABLET, FILM COATED ORAL at 17:00

## 2024-02-27 RX ADMIN — BUDESONIDE 0.5 MG: 0.5 INHALANT ORAL at 07:27

## 2024-02-27 RX ADMIN — INSULIN LISPRO 2 UNITS: 100 INJECTION, SOLUTION INTRAVENOUS; SUBCUTANEOUS at 16:57

## 2024-02-27 RX ADMIN — ACETAMINOPHEN 650 MG: 325 TABLET, FILM COATED ORAL at 17:31

## 2024-02-27 RX ADMIN — BUDESONIDE 0.5 MG: 0.5 INHALANT ORAL at 23:01

## 2024-02-27 RX ADMIN — Medication 0.5 MG/HR: at 01:16

## 2024-02-27 RX ADMIN — DULOXETINE HYDROCHLORIDE 60 MG: 30 CAPSULE, DELAYED RELEASE ORAL at 08:02

## 2024-02-27 RX ADMIN — ATORVASTATIN CALCIUM 40 MG: 40 TABLET, FILM COATED ORAL at 08:02

## 2024-02-27 RX ADMIN — POTASSIUM CHLORIDE 40 MEQ: 1500 TABLET, EXTENDED RELEASE ORAL at 08:02

## 2024-02-27 RX ADMIN — TRAZODONE HYDROCHLORIDE 150 MG: 150 TABLET ORAL at 20:40

## 2024-02-27 NOTE — CASE MANAGEMENT
Case Management Assessment & Discharge Planning Note    Patient name Mattie Joy  Location /-01 MRN [id number]  : 1960 Date 2024       Current Admission Date: 2024  Current Admission Diagnosis:Acute on chronic respiratory failure (HCC)   Patient Active Problem List    Diagnosis Date Noted    Eosinophilic asthma 2023    COPD, severe (HCC) 2023    Hepatic steatosis 2023    Morbid obesity (HCC) 02/15/2023    Diabetic polyneuropathy associated with type 2 diabetes mellitus (HCC) 11/15/2021    Type 2 diabetes mellitus with hyperglycemia (HCC) 2021    Tubular adenoma of colon 2021    Transaminitis 2021    Gastric polyp 2021    Class 3 severe obesity in adult (HCC) 2021    Pulmonary hypertension (HCC) 2020    Insomnia 10/28/2020    Abnormal CT of the chest 2020    Lactic acidosis 2020    Acute on chronic diastolic CHF (congestive heart failure) (HCC) 2020    Microcytic anemia 2020    Neck pain, chronic 2019    Current moderate episode of major depressive disorder without prior episode (HCC) 2019    Paroxysmal atrial fibrillation (HCC) 2018    Severe persistent asthma 2018    Hypokalemia 2018    Acute on chronic respiratory failure (HCC) 2018    Abnormal computed tomography angiography (CTA) of abdomen 2018    Abnormal CT of the abdomen 2018    Iron deficiency anemia due to chronic blood loss 2018    Type 2 diabetes mellitus with stage 2 chronic kidney disease, with long-term current use of insulin (HCC)     Ganglion cyst of flexor tendon sheath 2017    Paresthesia of upper extremity 2017    Cervical radiculopathy 2017    Elevated liver enzymes 2015    Sexual dysfunction 2014    Chronic low back pain 10/15/2014    Hypercholesterolemia 2014    Lumbar radiculopathy 2014    Esophageal reflux 2014    Hypertensive  heart and chronic kidney disease with heart failure and stage 1 through stage 4 chronic kidney disease, or chronic kidney disease (HCC) 03/24/2014    Obstructive sleep apnea 03/09/2014      LOS (days): 1  Geometric Mean LOS (GMLOS) (days): 3.9  Days to GMLOS:2.8     OBJECTIVE:  PATIENT READMITTED TO HOSPITAL  Risk of Unplanned Readmission Score: 42.78         Current admission status: Inpatient       Preferred Pharmacy:   [facility] [address]  [address] [address]  Phone: [phone] Fax: [phone]    [facility] - [place], [place] - [address]  [address]  [address]  [place] access hospital [address]  Phone: [phone] Fax: [phone]    Primary Care Provider: Laith Olivares MD    Primary Insurance: MEDICARE  Secondary Insurance: AETNA    ASSESSMENT:  Active Health Care Proxies       PADMA SAUCDEO Health Care Representative - Daughter   Primary Phone: [phone] (Mobile)                 Advance Directives  Does patient have a Health Care POA?: No  Was patient offered paperwork?: Yes (declined)  Does patient currently have a Health Care decision maker?: Yes, please see Health Care Proxy section  Does patient have Advance Directives?: No  Was patient offered paperwork?: Yes (declined)    Readmission Root Cause  30 Day Readmission: Yes  Who directed you to return to the hospital?: Specialist  Did you understand whom to contact if you had questions or problems?: Yes  Did you get your prescriptions before you left the hospital?: No  Reason:: Delivery service not offered  Were you able to get your prescriptions filled when you left the hospital?: Yes  Did you take your medications as prescribed?: Yes  Were you able to get to your follow-up appointments?: Yes  During previous admission, was a post-acute recommendation made?: Yes  What post-acute resources were offered?: [facility]  Patient was readmitted due to: who  presents with weight gain and shortness of breath.    Patient Information  Admitted from:: Home  Mental Status: Alert  During Assessment patient was accompanied by: Not accompanied during assessment  Assessment information provided by:: Patient  Primary Caregiver: Self  Support Systems: Family members  County of Residence: [place]  [place] you [place]  Home entry access options. Select all that apply.: Stairs  Number of steps to enter home.: 2  Do the steps have railings?: No  Type [facility]  Living Arrangements: Lives Alone  Is patient a ?: No    Activities of Daily Living Prior to Admission  Functional Status: Independent  Completes ADLs independently?: Yes  Ambulates independently?: Yes  Does patient use assisted devices?: Yes  Assisted Devices (DME) used: BiPAP, CPAP, Walker, Home Oxygen concentrator, Portable Oxygen concentrator, Nebulizer  DME Company Name (respiratory supplies): [employer]  Does patient currently own DME?: Yes  What DME does the patient currently own?: BiPAP, Portable Oxygen concentrator, CPAP, Home Oxygen concentrator, Nebulizer, Walker  Does patient have a history of Outpatient Therapy (PT/OT)?: No  Does the patient have a history of Short-Term Rehab?: No  Does patient have a history of HHC?: Yes ([facility])  Does patient currently have HHC?: Yes    Current Home Health Care  Type of Current Home Care Services: Nurse visit  Current Home Health Agency:: Other (please enter name in comment) ([facility])  Current Home Health Follow-Up Provider:: PCP    Patient Information Continued  Does patient have prescription coverage?: Yes  Does patient receive dialysis treatments?: No  Does patient have a history of substance abuse?: No  Does patient have a history of Mental Health Diagnosis?: No    Means of Transportation  Means of Transport to Appts:: Family transport      Social Determinants of Health (SDOH)      Flowsheet Row Most Recent Value    Housing Stability    In the last 12 months, was there a time when you were not able to pay the mortgage or rent on time? N   In the last 12 months, how many places have you lived? 1   In the last 12 months, was there a time when you did not have a steady place to sleep or slept in a shelter (including now)? N   Transportation Needs    In the past 12 months, has lack of transportation kept you from medical appointments or from getting medications? no   In the past 12 months, has lack of transportation kept you from meetings, work, or from getting things needed for daily living? No   Food Insecurity    Within the past 12 months, you worried that your food would run out before you got the money to buy more. Never true   Within the past 12 months, the food you bought just didn't last and you didn't have money to get more. Never true   Utilities    In the past 12 months has the electric, gas, oil, or water company threatened to shut off services in your home? No            DISCHARGE DETAILS:    Discharge planning discussed with:: patient  Freedom of Choice: Yes  Comments - Freedom of Choice: [facility]  CM contacted family/caregiver?: No- see comments (reports being in contact with family)  Were Treatment Team discharge recommendations reviewed with patient/caregiver?: Yes  Did patient/caregiver verbalize understanding of patient care needs?: Yes  Were patient/caregiver advised of the risks associated with not following Treatment Team discharge recommendations?: Yes    Requested Home Health Care         Is the patient interested in HHC at discharge?: Yes  Home Health Discipline requested:: Nursing  Home Health Agency Name:: [facility]  HHA External Referral Reason (only applicable if external HHA name selected): Patient has established relationship with provider  Home Health Follow-Up Provider:: PCP  Home Health Services Needed:: COPD Management, Diabetes Management,  Oxygen Via Nasal Cannula  Homebound Criteria Met:: Requires the Assistance of Another Person for Safe Ambulation or to Leave the Home  Supporting Clincal Findings:: Limited Endurance, Requires Oxygen    DME Referral Provided  Referral made for DME?: No    Other Referral/Resources/Interventions Provided:  Interventions: C  Referral Comments: referral to [facility]    Treatment Team Recommendation: Home with Home Health Care  Discharge Destination Plan:: Home with Home Health Care  Transport at Discharge : Family     Additional Comments: Met with pt to discuss the role of CM and to discuss any help pt may need prior to dc. Pt lives alone in 1sh, 2 paxton, no railing. Pt performed ADL's indptly pta, pt has a walker, uses bipap, cpap, nebulizer, oxygen, wheelchair. DME through [facility]. Has private HHA that comes in once a week. Open to [facility]. [facility] referral sent. Friend assists with transportation(Melinda). Pt's friend(Viktor) does grocery shopping. Denies hx of SNF/MH/D&A. Pt's friend(Viktor) to transport home.

## 2024-02-27 NOTE — ASSESSMENT & PLAN NOTE
On Cymbalta, trazodone and Lexapro.  Reported Lexapro was discontinued  Continue Cymbalta and trazodone

## 2024-02-27 NOTE — H&P
[facility]  H&P  Name: Mattie Joy 63 y.o. female I MRN: [id number]  Unit/Bed#: -01 I Date of Admission: 2/26/2024   Date of Service: 2/26/2024 I Hospital Day: 0      Assessment/Plan   * Acute on chronic respiratory failure (HCC)  Assessment & Plan  At baseline on 4 L nasal cannula oxygen secondary to severe COPD, pulmonary hypertension  Required up to 7 L nasal cannula oxygen on presentation  Likely decompensated secondary to CHF  Flu/COVID/RSV negative  Monitor with diuresis  Wean off nasal cannula oxygen to maintain saturation greater than 88%    Acute on chronic diastolic CHF (congestive heart failure) (HCC)  Assessment & Plan  Wt Readings from Last 3 Encounters:   02/26/24 125 kg (274 lb 11.1 oz)   02/26/24 127 kg (279 lb)   02/26/24 127 kg (279 lb)     Seen in the cardiology office today  Approximately 15 pounds weight gain in the past 2 weeks.  Noncompliant with salt diet  On Bumex 6 mg twice daily recently increased   patient obese  Chest x-ray with congestion  Last echo August 2023 showed LVEF 65% diastolic dysfunction 2, R VSP 61 mmHg    Plan:   Start Bumex drip  Strict intake output  Daily weights  Low-salt diet  Cardiology consult  Continue spironolactone, metoprolol and Jardiance                COPD, severe (HCC)  Assessment & Plan  Followed with pulmonology.  On albuterol nebulizers as needed, Pulmicort, Claritin, Singulair, bevespi   Noncompliant with medication.  Continue Pulmicort/albuterol, Claritin, Singulair    Not in COPD exacerbation currently    Current moderate episode of major depressive disorder without prior episode (HCC)  Assessment & Plan  On Cymbalta, trazodone and Lexapro.  Reported Lexapro was discontinued  Continue Cymbalta and trazodone    Paroxysmal atrial fibrillation (HCC)  Assessment & Plan  On metoprolol 100 mg daily and Eliquis 5 twice daily  Continue    Type 2 diabetes mellitus with stage 2 chronic kidney disease, with long-term  current use of insulin (HCC)  Assessment & Plan  Lab Results   Component Value Date    HGBA1C 8.5 (H) 01/22/2024       Recent Labs     02/26/24  1810   POCGLU 233*       Blood Sugar Average: Last 72 hrs:  (P) 233  On Lantus 55 units at bedtime and Apidra 25 units 3 times daily with meals.  Continue Lantus 85 units at bedtime, Humalog 25 units 3 times daily with meals and sliding scale  Diabetic diet  hypoGlycemia protocol         VTE Pharmacologic Prophylaxis: VTE Score: 6 High Risk (Score >/= 5) - Pharmacological DVT Prophylaxis Ordered: apixaban (Eliquis). Sequential Compression Devices Ordered.  Code Status: Level 1 - Full Code   Discussion with family: Patient declined call to .     Anticipated Length of Stay: Patient will be admitted on an inpatient basis with an anticipated length of stay of greater than 2 midnights secondary to acute on chronic chf .    Total Time Spent on Date of Encounter in care of patient: 75 mins. This time was spent on one or more of the following: performing physical exam; counseling and coordination of care; obtaining or reviewing history; documenting in the medical record; reviewing/ordering tests, medications or procedures; communicating with other healthcare professionals and discussing with patient's family/caregivers.    Chief Complaint: Shortness of breath    History of Present Illness:  Mattie Joy is a 63 y.o. female with a PMH of severe COPD, chronic respiratory failure, chronic diastolic heart failure and pulmonary hypertension, type 2 diabetes, WILMA, anxiety and depression, who presents with weight gain and shortness of breath.  Patient with recent admission for same.  Patient was seen in cardiology office today was noted to have 15 pounds weight gain in the past 2 weeks also she was noted to be hypoxic at 5 L nasal cannula oxygen and patient was transferred to emergency department.  Patient briefly required BiPAP on presentation.    Was admitted for further  management    Review of Systems:  Review of Systems   Constitutional:  Negative for chills and fever.   Respiratory:  Positive for shortness of breath. Negative for cough and chest tightness.    Cardiovascular:  Positive for leg swelling. Negative for palpitations.   Gastrointestinal:  Negative for abdominal pain, constipation and diarrhea.   All other systems reviewed and are negative.      Past Medical and Surgical History:   Past Medical History:   Diagnosis Date    Acute blood loss anemia 06/01/2021    Acute diverticulitis 05/02/2021    Acute on chronic diastolic congestive heart failure (HCC) 05/21/2020    Acute on chronic respiratory failure with hypoxia (HCC) 11/30/2018    Alcohol abuse 05/21/2020    Anemia     Asthma with COPD with exacerbation  11/12/2020    Atrial fibrillation ([facility])     Cervical radiculopathy     CHF (congestive heart failure) ([facility])     Chronic low back pain     Chronic obstructive asthma 02/20/2018    Cigarette nicotine dependence without complication 11/20/2019    Quit 12/10/2019.     Community acquired pneumonia 05/21/2020    COPD exacerbation ([facility]) 09/16/2020    Depression with anxiety 03/09/2014    Diabetes mellitus with hyperglycemia ([facility])     Diverticulitis 06/06/2021    Elevated liver enzymes     GERD (gastroesophageal reflux disease)     Hypersomnolence 10/28/2020    Hypokalemia 12/04/2018    Lower gastrointestinal bleeding 06/01/2021    Paresthesia of upper extremity     Plantar fasciitis     Restless legs syndrome 04/08/2014    Severe persistent asthma with exacerbation 02/20/2018    PFTs February 8, 2018:  FEV1 0.87 L-35% predicted, 27% improvement post-bronchodilator.  DLCO-82% predicted,  The patient has been having worsening of her symptoms now for few months, especially  from January 2020, also she had multiple exacerbations last year and most recently required a steroid burst and August  Reviewing her CT scan  New PFTs with severe obstruction reviewed  Hypersensitivity p     Sexual dysfunction     Sleep apnea, obstructive     Tenosynovitis of finger     Tinea corporis     Tobacco use 2018    Currently smoking 3-4 cigarettes daily    Trigger middle finger of left hand 2017    Trigger ring finger of left hand 2017    Weakness of upper extremity        Past Surgical History:   Procedure Laterality Date    ABDOMINAL SURGERY      CARPAL TUNNEL RELEASE Bilateral      SECTION      CHOLECYSTECTOMY      laparoscopic    ESOPHAGOGASTRODUODENOSCOPY N/A 12/3/2018    Procedure: ESOPHAGOGASTRODUODENOSCOPY (EGD) AND COLONOSCOPY;  Surgeon: Ludmila Perry MD;  Location: [facility];  Service: Gastroenterology    GASTRIC BYPASS      for morbid obesity with gilda en y    HERNIA REPAIR      HYSTERECTOMY      NV EXCISION GANGLION WRIST DORSAL/VOLAR PRIMARY Left 2017    Procedure: LONG FINGER GANGLION CYST EXCISION;  Surgeon: Philippe Clark MD;  Location: [facility];  Service: Orthopedics    NV TENDON SHEATH INCISION Left 2017    Procedure: THUMB, LONG, RING, TRIGGER FINGER RELEASE;  Surgeon: Philippe Clark MD;  Location: [facility];  Service: Orthopedics    SHOULDER SURGERY Right        Meds/Allergies:  Prior to Admission medications    Medication Sig Start Date End Date Taking? Authorizing Provider   albuterol (2.5 mg/3 mL) 0.083 % nebulizer solution Take 3 mL (2.5 mg total) by nebulization every 6 (six) hours as needed for wheezing or shortness of breath 23  Yes Tin Brennan MD   apixaban (Eliquis) 5 mg Take 1 tablet (5 mg total) by mouth 2 (two) times a day 23  Yes Jovanni Pacheco MD   atorvastatin (LIPITOR) 40 mg tablet Take 1 tablet (40 mg total) by mouth daily 23  Yes Jovanni Pacheco MD   bumetanide (BUMEX) 2 mg tablet Take 3 tablets (6 mg total) by mouth 2 (two) times a day 2/26/24 3/27/24 Yes Laith Olivares MD   DULoxetine (CYMBALTA) 60 mg delayed release capsule Take 60 mg by mouth daily 24  Yes Historical Provider,  MD   Empagliflozin (JARDIANCE) 10 MG TABS tablet Take 1 tablet (10 mg total) by mouth daily 2/10/24  Yes Hawa Marsh MD   Insulin Glargine Solostar (Lantus SoloStar) 100 UNIT/ML SOPN Inject 0.58 mL (58 Units total) as directed daily at bedtime Inject 55 units daily at bedtime 2/9/24  Yes Hawa Marsh MD   insulin glulisine (Apidra SoloStar) 100 units/mL injection pen Inject 25 Units under the skin 3 (three) times a day with meals 25 units 3 times a day with meals 2/9/24  Yes Hawa Marsh MD   loratadine (CLARITIN) 10 mg tablet Take 1 tablet (10 mg total) by mouth daily as needed for allergies 2/9/24  Yes Hawa Marsh MD   metoprolol succinate (TOPROL-XL) 100 mg 24 hr tablet Take 1 tablet (100 mg total) by mouth daily 3/29/23  Yes Jovanni Pacheco MD   montelukast (SINGULAIR) 10 mg tablet TAKE 1 TABLET BY MOUTH EVERYDAY AT BEDTIME 11/11/23  Yes Laith Olivares MD   omeprazole (PriLOSEC) 40 MG capsule Take 1 capsule (40 mg total) by mouth daily 2/9/24  Yes Laith Olivares MD   potassium chloride (Klor-Con M20) 20 mEq tablet Take 2 tablets (40 mEq total) by mouth daily 2/12/24  Yes Jovanni Pacheco MD   spironolactone (ALDACTONE) 50 mg tablet Take 1 tablet (50 mg total) by mouth daily 2/26/24 3/27/24 Yes Laith Olivares MD   traZODone (DESYREL) 150 mg tablet TAKE 1 TABLET BY MOUTH EVERYDAY AT BEDTIME 2/17/24  Yes Laith Olivares MD   albuterol (PROVENTIL HFA,VENTOLIN HFA) 90 mcg/act inhaler Inhale 2 puffs every 4 (four) hours as needed for wheezing 6/8/23   Tin Brennan MD   Blood Glucose Monitoring Suppl (Contour Next One) AILYN USE AS DIRECTED 3 TIMES A DAY  Patient taking differently: USE AS DIRECTED 3 TIMES A DAY    Once a day 10/30/23   Laith Olivares MD   budesonide (PULMICORT) 0.5 mg/2 mL nebulizer solution TAKE 2 ML (0.5 MG TOTAL) BY NEBULIZATION TWICE A DAY RINSE MOUTH AFTER USE  Patient not taking: Reported on 2/26/2024 10/30/23   Tin Brennan MD   Contour Next Test test strip  USE TO TEST BLOOD SUGAR 3 TIMES A DAY  Patient taking differently: USE TO TEST BLOOD SUGAR 3 TIMES A DAY    Once a day 10/29/23   Laith Olivares MD   [employer] Lancets Thin 26G MISC USE 3 (THREE) TIMES A DAY  Patient taking differently: Once a day 5/4/22   Laith Olivares MD   EPINEPHrine (EPIPEN) 0.3 mg/0.3 mL SOAJ Inject 0.3 mL (0.3 mg total) into a muscle once for 1 dose 10/19/23 2/20/24  VICKY Crow   escitalopram (LEXAPRO) 20 mg tablet TAKE 1 TABLET BY MOUTH EVERY DAY  Patient not taking: Reported on 2/26/2024 12/4/23   Laith Olivares MD   ferrous sulfate 220 (44 Fe) mg/5 mL solution TAKE 5 ML (220 MG TOTAL) BY MOUTH 2 (TWO) TIMES A DAY BEFORE MEALS  Patient not taking: Reported on 2/26/2024 2/1/22 2/20/24  Jaime Doshi MD   fluticasone (FLONASE) 50 mcg/act nasal spray 1 spray into each nostril 2 (two) times a day  Patient not taking: Reported on 2/26/2024 9/23/20   Tin Brennan MD   glycopyrrolate-formoterol (BEVESPI AEROSPHERE) 9-4.8 MCG/ACT inhaler Inhale 2 puffs 2 (two) times a day 2/20/24   Suyapa España PA-C   Insulin Pen Needle (BD Pen Needle Love 2nd Gen) 32G X 4 MM MISC Use daily at bedtime Use 4 new needles daily . 12/6/23   Lobito Alfredo PA-C   levalbuterol (XOPENEX) 1.25 mg/0.5 mL nebulizer solution Take 0.5 mL (1.25 mg total) by nebulization 2 (two) times a day  Patient not taking: Reported on 2/26/2024 11/16/20   Irma Friedman DO   LORazepam (ATIVAN) 0.5 mg tablet TAKE 2 TABLETS BY MOUTH AT BEDTIME AND 1 EVERY 6 HOURS AS NEEDED FOR ANXIETY 1/19/24   Laith Olivares MD   naloxone (NARCAN) 4 mg/0.1 mL nasal spray Administer 1 spray into a nostril. If breathing does not return to normal or if breathing difficulty resumes after 2-3 minutes, give another dose in the other nostril using a new spray. 7/29/20   Laith Olivares MD   nystatin (MYCOSTATIN) powder Apply topically 2 (two) times a day  Patient not taking: Reported on 2/26/2024 2/9/24   Hawa Marsh MD    Benralizumab 30 MG/ML SOAJ Inject 1 mL under the skin every 28 days  Patient not taking: Reported on 2024  Tin Brennan MD   bumetanide (BUMEX) 2 mg tablet Take 2.5 tablets (5 mg total) by mouth 2 (two) times a day 24  Hawa Marsh MD   fluconazole (DIFLUCAN) 150 mg tablet Take 1 tablet today, repeat in 3 days if needed  Patient not taking: Reported on 2024  Historical Provider, MD   spironolactone (ALDACTONE) 25 mg tablet Take 1 tablet (25 mg total) by mouth daily 2/10/24 2/26/24  Hawa Marsh MD     I have reviewed home medications with patient personally.    Allergies:   Allergies   Allergen Reactions    Iron Dextran Swelling    Januvia [Sitagliptin] Shortness Of Breath    Jardiance [Empagliflozin] Shortness Of Breath    Clonazepam Other (See Comments)     Unknown reaction    Codeine Itching and Other (See Comments)     itch;mary kay morphine,takes ultram @home    Adhesive [Medical Tape] Itching     itching    Effexor [Venlafaxine] Itching    Lactose - Food Allergy GI Intolerance    Oxycodone-Acetaminophen Anxiety    Oxycodone-Acetaminophen Itching and Rash     Other reaction(s): Other (See Comments)  (PERCOCET) MILD RASH, not allergic to Acetaminophen        Social History:  Marital Status: Significant Other   Occupation:   Patient Pre-hospital Living Situation: Alone  Patient Pre-hospital Level of Mobility: walks  Patient Pre-hospital Diet Restrictions: none  Substance Use History:   Social History     Substance and Sexual Activity   Alcohol Use Not Currently    Alcohol/week: 20.0 standard drinks of alcohol    Types: 20 Cans of beer per week    Comment: about 6 coors light every night, stopped in 2019     Social History     Tobacco Use   Smoking Status Former    Current packs/day: 0.00    Average packs/day: 0.3 packs/day for 29.9 years (7.5 ttl pk-yrs)    Types: Cigarettes    Start date:     Quit date: 12/10/2019    Years since quittin.2   Smokeless  "Tobacco Never     Social History     Substance and Sexual Activity   Drug Use No       Family History:  Family History   Problem Relation Age of Onset    Alzheimer's disease Mother     Atrial fibrillation Mother     Dementia Mother     Heart disease Mother         heart problem    Seizures Mother     Parkinsonism Father     Arthritis Father     Atrial fibrillation Father     No Known Problems Daughter     Cri-du-chat syndrome Daughter     Colon cancer Maternal Grandmother 80    Diabetes type II Maternal Grandmother     No Known Problems Brother     No Known Problems Son     Substance Abuse Neg Hx         mother,father    Mental illness Neg Hx         mother,father    Colon polyps Neg Hx        Physical Exam:     Vitals:   Blood Pressure: 111/53 (02/26/24 1820)  Pulse: (!) 106 (02/26/24 1820)  Temperature: 98.2 °F (36.8 °C) (02/26/24 1820)  Temp Source: Temporal (02/26/24 1350)  Respirations: 20 (02/26/24 1820)  Height: 5' 3\" (160 cm) (02/26/24 1814)  Weight - Scale: 125 kg (274 lb 11.1 oz) (02/26/24 1814)  SpO2: 95 % (02/26/24 1820)    Physical Exam  Vitals and nursing note reviewed.   Constitutional:       General: She is not in acute distress.     Appearance: She is obese. She is not diaphoretic.   HENT:      Head: Normocephalic.   Eyes:      General: No scleral icterus.        Right eye: No discharge.         Left eye: No discharge.   Cardiovascular:      Rate and Rhythm: Regular rhythm. Tachycardia present.   Pulmonary:      Effort: Pulmonary effort is normal. No respiratory distress.      Breath sounds: Rales present. No wheezing or rhonchi.   Abdominal:      General: There is no distension.      Palpations: Abdomen is soft.      Tenderness: There is no abdominal tenderness. There is no guarding or rebound.   Musculoskeletal:      Cervical back: Normal range of motion.      Right lower leg: Edema present.      Left lower leg: Edema present.   Skin:     General: Skin is warm.   Neurological:      Mental Status: " She is alert and oriented to person, place, and time.   Psychiatric:         Mood and Affect: Mood normal.         Behavior: Behavior normal.         Thought Content: Thought content normal.         Judgment: Judgment normal.          Additional Data:     Lab Results:  Results from last 7 days   Lab Units 02/26/24  1423 02/26/24  1408   WBC Thousand/uL  --  12.18*   HEMOGLOBIN g/dL  --  9.8*   I STAT HEMOGLOBIN g/dl 11.9  --    HEMATOCRIT %  --  35.1   HEMATOCRIT, ISTAT % 35  --    PLATELETS Thousands/uL  --  301   NEUTROS PCT %  --  73   LYMPHS PCT %  --  20   MONOS PCT %  --  6   EOS PCT %  --  0     Results from last 7 days   Lab Units 02/26/24  1423 02/26/24  1408   SODIUM mmol/L  --  139   POTASSIUM mmol/L  --  3.6   CHLORIDE mmol/L  --  94*   CO2 mmol/L  --  37*   CO2, I-STAT mmol/L 33*  --    BUN mg/dL  --  16   CREATININE mg/dL  --  0.72   ANION GAP mmol/L  --  8   CALCIUM mg/dL  --  9.3   ALBUMIN g/dL  --  4.1   TOTAL BILIRUBIN mg/dL  --  0.39   ALK PHOS U/L  --  147*   ALT U/L  --  26   AST U/L  --  20   GLUCOSE RANDOM mg/dL  --  83     Results from last 7 days   Lab Units 02/26/24  1408   INR  1.33*     Results from last 7 days   Lab Units 02/26/24  1810   POC GLUCOSE mg/dl 233*         Results from last 7 days   Lab Units 02/26/24  1648 02/26/24  1408   LACTIC ACID mmol/L 0.8 2.3*   PROCALCITONIN ng/ml  --  0.06       Lines/Drains:  Invasive Devices       Peripheral Intravenous Line  Duration             Peripheral IV 02/26/24 Right Antecubital <1 day                        Imaging: Reviewed radiology reports from this admission including: chest xray  XR chest portable   Final Result by Kb Rich MD (02/26 1723)      Stable cardiomegaly and central pulmonary vascular congestion            Workstation performed: [id number]             EKG and Other Studies Reviewed on Admission:   EKG: Atrial fibrillation. .    ** Please Note: This note has been constructed using a voice recognition system.  **

## 2024-02-27 NOTE — MALNUTRITION/BMI
This medical record reflects one or more clinical indicators suggestive of morbid obesity.      BMI Findings:  Adult BMI Classifications: Morbid Obesity 45-49.9        Body mass index is 48.38 kg/m².     See Nutrition note dated 2/27/24 for additional details.  Completed nutrition assessment is viewable in the nutrition documentation.

## 2024-02-27 NOTE — ASSESSMENT & PLAN NOTE
Lab Results   Component Value Date    HGBA1C 8.5 (H) 01/22/2024       Recent Labs     02/26/24  1810   POCGLU 233*       Blood Sugar Average: Last 72 hrs:  (P) 233  On Lantus 55 units at bedtime and Apidra 25 units 3 times daily with meals.  Continue Lantus 85 units at bedtime, Humalog 25 units 3 times daily with meals and sliding scale  Diabetic diet  hypoGlycemia protocol

## 2024-02-27 NOTE — ASSESSMENT & PLAN NOTE
Followed with pulmonology.  On albuterol nebulizers as needed, Pulmicort, Claritin, Singulair, bevespi   Noncompliant with medication.  Continue Pulmicort/albuterol, Claritin, Singulair    Not in COPD exacerbation currently

## 2024-02-27 NOTE — PROGRESS NOTES
Pastoral Care Progress Note    2024  Patient: Mattie Joy : 1960  Admission Date & Time: 2024 1346  MRN: [id number] [facility]: [id number]             24 1200   Clinical Encounter Type   Visited With Patient   Routine Visit Introduction      Intern visited with pt while rounding.  Intern asked the pt if she needs support at this time. Pt stated she did not. Spiritual care remains available.    Chaplaincy Outcomes Achieved:  Declined  support

## 2024-02-27 NOTE — TELEPHONE ENCOUNTER
Good morning. This is Miya calling from [facility] phone number [phone]. I'm calling regarding Vicki Joy, birthday [birth year]. She was admitted to [facility] yesterday with the diagnosis of exacerbation of CHF and COPD. Thank you so much. yassine Baer.  You received a voice mail from [facility].

## 2024-02-27 NOTE — ASSESSMENT & PLAN NOTE
Lab Results   Component Value Date    HGBA1C 8.5 (H) 01/22/2024       Recent Labs     02/26/24  1810 02/26/24  2135 02/27/24  0802 02/27/24  1124   POCGLU 233* 145* 142* 123         Blood Sugar Average: Last 72 hrs:  (P) 160.75  On Lantus 55 units at bedtime and Apidra 25 units 3 times daily with meals.  Continue Lantus 85 units at bedtime, Humalog 25 units 3 times daily with meals and sliding scale  Diabetic diet  hypoGlycemia protocol

## 2024-02-27 NOTE — PLAN OF CARE
Problem: Potential for Falls  Goal: Patient will remain free of falls  Description: INTERVENTIONS:  - Educate patient/family on patient safety including physical limitations  - Instruct patient to call for assistance with activity   - Consult OT/PT to assist with strengthening/mobility   - Keep Call bell within reach  - Keep bed low and locked with side rails adjusted as appropriate  - Keep care items and personal belongings within reach  - Initiate and maintain comfort rounds  - Make Fall Risk Sign visible to staff2  - Offer Toileting every 2 Hours, in advance of need  - Initiate/Maintain 2alarm  - Obtain necessary fall risk management equipment: 2  - Apply yellow socks and bracelet for high fall risk patients  - Consider moving patient to room near nurses station  Outcome: Progressing

## 2024-02-27 NOTE — CONSULTS
Consult - Cardiology   Mattie Joy 63 y.o. female MRN: [id number]  Unit/Bed#: -Joe Encounter: [id number]            ASSESSMENT:  Acute on chronic diastolic heart failure  8/15/2023 echo: LVEF 65%, grade 2 diastolic dysfunction, severely elevated RV systolic pressure 61 mmHg  Current diuretic: Bumex 5 BID  Wt as low as 265 in the hospital February 2024  Persistent atrial fibrillation  Thromboembolic PPx: Eliquis 5 twice daily  Rate control: Toprol- daily  COPD with acute exacerbation  Type 2 diabetes  Obesity, BMI 51  Pulmonary hypertension likely group 2/3 from left heart disease, underlying lung disease, OHV    PLAN/ DISCUSSION:     She may benefit from placement in a long-term nursing facility where her fluid and salt may be monitored closely. Otherwise, she presents a high risk of readmission for acute CHF. She has been extensively counseled on salt and fluid restrictions and has been noncompliant with this. He has been noted to be sneaking extra salty snacks and drinking fluids via her room faucet during her prior hospitalizations.  Continue IV bumex gtt; daily standing weights, strict I&O monitoring.  Follow BMP trends; replenish for goal K above 4 and Mg above 2  Telemetry monitoring while diuresing         History of Present Illness:  Mattie Joy is a 63 y.o. female well-known to our practice. Recently discharged from [facility] due to decompensated heart failure from willful noncompliance with her diet and fluid restriction presents with +15 pound weight gain and hypoxia during outpatient OV yesterday. She has been very short of breath with minimal activity. She reported today in office eating salty snacks such as salted pretzels on a regular basis with large amount of fluids throughout the day >3 L daily. Reports she has a friend who does her shopping for her and does get foods high in sodium and are usually all are available in her home. She advised to present to the ED for  IV diuretics. She was started on IV Bumex drip good response and stable BMP.    Past Medical History:        Past Medical History:   Diagnosis Date    Acute blood loss anemia 06/01/2021    Acute diverticulitis 05/02/2021    Acute on chronic diastolic congestive heart failure (HCC) 05/21/2020    Acute on chronic respiratory failure with hypoxia ([facility]) 11/30/2018    Alcohol abuse 05/21/2020    Anemia     Asthma with COPD with exacerbation  11/12/2020    Atrial fibrillation ([facility])     Cervical radiculopathy     CHF (congestive heart failure) ([facility])     Chronic low back pain     Chronic obstructive asthma 02/20/2018    Cigarette nicotine dependence without complication 11/20/2019    Quit 12/10/2019.     Community acquired pneumonia 05/21/2020    COPD exacerbation ([facility]) 09/16/2020    Depression with anxiety 03/09/2014    Diabetes mellitus with hyperglycemia ([facility])     Diverticulitis 06/06/2021    Elevated liver enzymes     GERD (gastroesophageal reflux disease)     Hypersomnolence 10/28/2020    Hypokalemia 12/04/2018    Lower gastrointestinal bleeding 06/01/2021    Paresthesia of upper extremity     Plantar fasciitis     Restless legs syndrome 04/08/2014    Severe persistent asthma with exacerbation 02/20/2018    PFTs February 8, 2018:  FEV1 0.87 L-35% predicted, 27% improvement post-bronchodilator.  DLCO-82% predicted,  The patient has been having worsening of her symptoms now for few months, especially  from January 2020, also she had multiple exacerbations last year and most recently required a steroid burst and August  Reviewing her CT scan  New PFTs with severe obstruction reviewed  Hypersensitivity p    Sexual dysfunction     Sleep apnea, obstructive     Tenosynovitis of finger     Tinea corporis     Tobacco use 02/20/2018    Currently smoking 3-4 cigarettes daily    Trigger middle finger of left hand 12/14/2017    Trigger ring finger of left hand 12/14/2017    Weakness of upper extremity       Past Surgical History:    Procedure Laterality Date    ABDOMINAL SURGERY      CARPAL TUNNEL RELEASE Bilateral      SECTION      CHOLECYSTECTOMY      laparoscopic    ESOPHAGOGASTRODUODENOSCOPY N/A 12/3/2018    Procedure: ESOPHAGOGASTRODUODENOSCOPY (EGD) AND COLONOSCOPY;  Surgeon: Ludmila Perry MD;  Location: [facility];  Service: Gastroenterology    GASTRIC BYPASS      for morbid obesity with gilda en y    HERNIA REPAIR      HYSTERECTOMY      SD EXCISION GANGLION WRIST DORSAL/VOLAR PRIMARY Left 2017    Procedure: LONG FINGER GANGLION CYST EXCISION;  Surgeon: Philippe Clark MD;  Location: [facility];  Service: Orthopedics    SD TENDON SHEATH INCISION Left 2017    Procedure: THUMB, LONG, RING, TRIGGER FINGER RELEASE;  Surgeon: Philippe Clark MD;  Location: [facility];  Service: Orthopedics    SHOULDER SURGERY Right         Allergy:        Allergies   Allergen Reactions    Iron Dextran Swelling    Januvia [Sitagliptin] Shortness Of Breath    Jardiance [Empagliflozin] Shortness Of Breath    Clonazepam Other (See Comments)     Unknown reaction    Codeine Itching and Other (See Comments)     itch;mary kay morphine,takes ultram @home    Adhesive [Medical Tape] Itching     itching    Effexor [Venlafaxine] Itching    Lactose - Food Allergy GI Intolerance    Oxycodone-Acetaminophen Anxiety    Oxycodone-Acetaminophen Itching and Rash     Other reaction(s): Other (See Comments)  (PERCOCET) MILD RASH, not allergic to Acetaminophen        Medications:       Prior to Admission medications    Medication Sig Start Date End Date Taking? Authorizing Provider   albuterol (2.5 mg/3 mL) 0.083 % nebulizer solution Take 3 mL (2.5 mg total) by nebulization every 6 (six) hours as needed for wheezing or shortness of breath 23  Yes Tin Brennan MD   apixaban (Eliquis) 5 mg Take 1 tablet (5 mg total) by mouth 2 (two) times a day 23  Yes Jovanni Pacheco MD   atorvastatin (LIPITOR) 40 mg tablet Take 1 tablet (40 mg total) by mouth  daily 12/6/23  Yes Jovanni Pacheco MD   bumetanide (BUMEX) 2 mg tablet Take 3 tablets (6 mg total) by mouth 2 (two) times a day 2/26/24 3/27/24 Yes Laith Olivares MD   DULoxetine (CYMBALTA) 60 mg delayed release capsule Take 60 mg by mouth daily 2/21/24  Yes Topher Barlow MD   Empagliflozin (JARDIANCE) 10 MG TABS tablet Take 1 tablet (10 mg total) by mouth daily 2/10/24  Yes Hawa Marsh MD   Insulin Glargine Solostar (Lantus SoloStar) 100 UNIT/ML SOPN Inject 0.58 mL (58 Units total) as directed daily at bedtime Inject 55 units daily at bedtime 2/9/24  Yes Hawa Marsh MD   insulin glulisine (Apidra SoloStar) 100 units/mL injection pen Inject 25 Units under the skin 3 (three) times a day with meals 25 units 3 times a day with meals 2/9/24  Yes Hawa Marsh MD   loratadine (CLARITIN) 10 mg tablet Take 1 tablet (10 mg total) by mouth daily as needed for allergies 2/9/24  Yes Hawa Marsh MD   metoprolol succinate (TOPROL-XL) 100 mg 24 hr tablet Take 1 tablet (100 mg total) by mouth daily 3/29/23  Yes Jovanni Pacheco MD   montelukast (SINGULAIR) 10 mg tablet TAKE 1 TABLET BY MOUTH EVERYDAY AT BEDTIME 11/11/23  Yes Laith Olivares MD   omeprazole (PriLOSEC) 40 MG capsule Take 1 capsule (40 mg total) by mouth daily 2/9/24  Yes Laith Olivares MD   potassium chloride (Klor-Con M20) 20 mEq tablet Take 2 tablets (40 mEq total) by mouth daily 2/12/24  Yes Jovanni Pahceco MD   spironolactone (ALDACTONE) 50 mg tablet Take 1 tablet (50 mg total) by mouth daily 2/26/24 3/27/24 Yes Laith Olivares MD   traZODone (DESYREL) 150 mg tablet TAKE 1 TABLET BY MOUTH EVERYDAY AT BEDTIME 2/17/24  Yes Laith Olivares MD   albuterol (PROVENTIL HFA,VENTOLIN HFA) 90 mcg/act inhaler Inhale 2 puffs every 4 (four) hours as needed for wheezing 6/8/23   Tin Brennan MD   Blood Glucose Monitoring Suppl (Contour Next One) AILYN USE AS DIRECTED 3 TIMES A DAY  Patient taking differently: USE AS DIRECTED 3  TIMES A DAY    Once a day 10/30/23   Laith Olivares MD   budesonide (PULMICORT) 0.5 mg/2 mL nebulizer solution TAKE 2 ML (0.5 MG TOTAL) BY NEBULIZATION TWICE A DAY RINSE MOUTH AFTER USE  Patient not taking: Reported on 2/26/2024 10/30/23   Tin Brennan MD   Contour Next Test test strip USE TO TEST BLOOD SUGAR 3 TIMES A DAY  Patient taking differently: USE TO TEST BLOOD SUGAR 3 TIMES A DAY    Once a day 10/29/23   Laith Olivares MD   [employer] Lancets Thin 26G MISC USE 3 (THREE) TIMES A DAY  Patient taking differently: Once a day 5/4/22   Laith Olivares MD   EPINEPHrine (EPIPEN) 0.3 mg/0.3 mL SOAJ Inject 0.3 mL (0.3 mg total) into a muscle once for 1 dose 10/19/23 2/20/24  VICKY Crow   escitalopram (LEXAPRO) 20 mg tablet TAKE 1 TABLET BY MOUTH EVERY DAY  Patient not taking: Reported on 2/26/2024 12/4/23   Laith Olivares MD   ferrous sulfate 220 (44 Fe) mg/5 mL solution TAKE 5 ML (220 MG TOTAL) BY MOUTH 2 (TWO) TIMES A DAY BEFORE MEALS  Patient not taking: Reported on 2/26/2024 2/1/22 2/20/24  Jaime Doshi MD   fluticasone (FLONASE) 50 mcg/act nasal spray 1 spray into each nostril 2 (two) times a day  Patient not taking: Reported on 2/26/2024 9/23/20   Tin Brennan MD   glycopyrrolate-formoterol (BEVESPI AEROSPHERE) 9-4.8 MCG/ACT inhaler Inhale 2 puffs 2 (two) times a day 2/20/24   Suyapa España PA-C   Insulin Pen Needle (BD Pen Needle Love 2nd Gen) 32G X 4 MM MISC Use daily at bedtime Use 4 new needles daily . 12/6/23   Lobito Alfredo PA-C   levalbuterol (XOPENEX) 1.25 mg/0.5 mL nebulizer solution Take 0.5 mL (1.25 mg total) by nebulization 2 (two) times a day  Patient not taking: Reported on 2/26/2024 11/16/20   Irma Friedman DO   LORazepam (ATIVAN) 0.5 mg tablet TAKE 2 TABLETS BY MOUTH AT BEDTIME AND 1 EVERY 6 HOURS AS NEEDED FOR ANXIETY 1/19/24   Laith Olivares MD   naloxone (NARCAN) 4 mg/0.1 mL nasal spray Administer 1 spray into a nostril. If breathing does not  return to normal or if breathing difficulty resumes after 2-3 minutes, give another dose in the other nostril using a new spray. 20   Laith Olivares MD   nystatin (MYCOSTATIN) powder Apply topically 2 (two) times a day  Patient not taking: Reported on 2024   Hawa Marsh MD       Family History:     Family History   Problem Relation Age of Onset    Alzheimer's disease Mother     Atrial fibrillation Mother     Dementia Mother     Heart disease Mother         heart problem    Seizures Mother     Parkinsonism Father     Arthritis Father     Atrial fibrillation Father     No Known Problems Daughter     Cri-du-chat syndrome Daughter     Colon cancer Maternal Grandmother 80    Diabetes type II Maternal Grandmother     No Known Problems Brother     No Known Problems Son     Substance Abuse Neg Hx         mother,father    Mental illness Neg Hx         mother,father    Colon polyps Neg Hx         Social History:       Social History     Socioeconomic History    Marital status: Significant Other     Spouse name: None    Number of children: None    Years of education: None    Highest education level: None   Occupational History    None   Tobacco Use    Smoking status: Former     Current packs/day: 0.00     Average packs/day: 0.3 packs/day for 29.9 years (7.5 ttl pk-yrs)     Types: Cigarettes     Start date:      Quit date: 12/10/2019     Years since quittin.2    Smokeless tobacco: Never   Vaping Use    Vaping status: Never Used   Substance and Sexual Activity    Alcohol use: Not Currently     Alcohol/week: 20.0 standard drinks of alcohol     Types: 20 Cans of beer per week     Comment: about 6 coors light every night, stopped in     Drug use: No    Sexual activity: Not Currently   Other Topics Concern    None   Social History Narrative    None     Social Determinants of Health     Financial Resource Strain: Not on file   Food Insecurity: No Food Insecurity (2024)    Hunger Vital Sign      Worried About Running Out of Food in the Last Year: Never true     Ran Out of Food in the Last Year: Never true   Transportation Needs: No Transportation Needs (1/23/2024)    PRAPARE - Transportation     Lack of Transportation (Medical): No     Lack of Transportation (Non-Medical): No   Physical Activity: Not on file   Stress: Not on file   Social Connections: Not on file   Intimate Partner Violence: Not on file   Housing Stability: Low Risk  (1/23/2024)    Housing Stability Vital Sign     Unable to Pay for Housing in the Last Year: No     Number of Places Lived in the Last Year: 1     Unstable Housing in the Last Year: No       Weights/BMI:    Wt Readings from Last 3 Encounters:   02/27/24 124 kg (273 lb 1.6 oz)   02/26/24 127 kg (279 lb)   02/26/24 127 kg (279 lb)   , Body mass index is 48.38 kg/m².      ROS:  14 point ROS negative except as outlined above  Remainder review of systems is negative    Exam:  General: Alert, oriented and in no acute distress, cooperative  Head: Normocephalic, atraumatic.  Eyes: PERRLA. No icterus. Normal Conjunctiva.   Oropharynx: Moist and normal-appearing mucosa  Neck: Supple, symmetrical, trachea midline, JVD not appreciated.   Heart: RRR, no murmur, rub or gallop, S1 & S2 normal   Lungs: Normal air entry, lungs clear to auscultation and no rales, rhonchi or wheezing   Abdomen: Flat, normal findings: bowel sounds normal and soft, non-tender  Lower Limbs:  No pitting edema, 2+ peripheral pulses, capillary refill within normal limits  Musculoskeletal: ROM grossly normal

## 2024-02-27 NOTE — ASSESSMENT & PLAN NOTE
Wt Readings from Last 3 Encounters:   02/27/24 124 kg (273 lb 1.6 oz)   02/26/24 127 kg (279 lb)   02/26/24 127 kg (279 lb)     Seen in the cardiology office 2/26  Approximately 15 pounds weight gain in the past 2 weeks.  Noncompliant with salt diet  On Bumex 6 mg twice daily recently increased   patient obese  Chest x-ray with congestion  Last echo August 2023 showed LVEF 65% diastolic dysfunction 2, R VSP 61 mmHg    Plan:   Continue Bumex drip  Strict intake output  Daily weights  Low-salt diet  Cardiology consulted  Continue spironolactone, metoprolol and Jardiance

## 2024-02-27 NOTE — ASSESSMENT & PLAN NOTE
Wt Readings from Last 3 Encounters:   02/26/24 125 kg (274 lb 11.1 oz)   02/26/24 127 kg (279 lb)   02/26/24 127 kg (279 lb)     Seen in the cardiology office today  Approximately 15 pounds weight gain in the past 2 weeks.  Noncompliant with salt diet  On Bumex 6 mg twice daily recently increased   patient obese  Chest x-ray with congestion  Last echo August 2023 showed LVEF 65% diastolic dysfunction 2, R VSP 61 mmHg    Plan:   Start Bumex drip  Strict intake output  Daily weights  Low-salt diet  Cardiology consult  Continue spironolactone, metoprolol and Jardiance

## 2024-02-27 NOTE — ASSESSMENT & PLAN NOTE
At baseline on 4 L nasal cannula oxygen secondary to severe COPD, pulmonary hypertension  Required up to 7 L nasal cannula oxygen on presentation  Likely decompensated secondary to CHF  Flu/COVID/RSV negative  Monitor with diuresis  Wean off nasal cannula oxygen to maintain saturation greater than 88%

## 2024-02-27 NOTE — PROGRESS NOTES
[facility]  Progress Note  Name: Mattie Joy I  MRN: [id number]  Unit/Bed#: -01 I Date of Admission: 2/26/2024   Date of Service: 2/27/2024 I Hospital Day: 1    Assessment/Plan   * Acute on chronic respiratory failure (HCC)  Assessment & Plan  At baseline on 4 L nasal cannula oxygen secondary to severe COPD, pulmonary hypertension  Required up to 7 L nasal cannula oxygen on presentation  Likely decompensated secondary to CHF  Flu/COVID/RSV negative  Monitor with diuresis  Wean off nasal cannula oxygen to maintain saturation greater than 88%    Acute on chronic diastolic CHF (congestive heart failure) (HCC)  Assessment & Plan  Wt Readings from Last 3 Encounters:   02/27/24 124 kg (273 lb 1.6 oz)   02/26/24 127 kg (279 lb)   02/26/24 127 kg (279 lb)     Seen in the cardiology office 2/26  Approximately 15 pounds weight gain in the past 2 weeks.  Noncompliant with salt diet  On Bumex 6 mg twice daily recently increased   patient obese  Chest x-ray with congestion  Last echo August 2023 showed LVEF 65% diastolic dysfunction 2, R VSP 61 mmHg    Plan:   Continue Bumex drip  Strict intake output  Daily weights  Low-salt diet  Cardiology consulted  Continue spironolactone, metoprolol and Jardiance                COPD, severe (HCC)  Assessment & Plan  Followed with pulmonology.  On albuterol nebulizers as needed, Pulmicort, Claritin, Singulair, bevespi   Noncompliant with medication.  Continue Pulmicort/albuterol, Claritin, Singulair    Not in COPD exacerbation currently    Morbid obesity (HCC)  Assessment & Plan  Lifestyle changes encouraged     Current moderate episode of major depressive disorder without prior episode ([facility])  Assessment & Plan  On Cymbalta, trazodone and Lexapro.  Reported Lexapro was discontinued  Continue Cymbalta and trazodone    Paroxysmal atrial fibrillation (HCC)  Assessment & Plan  On metoprolol 100 mg daily and Eliquis 5 twice daily  Currently rate  controlled   Continue    Type 2 diabetes mellitus with stage 2 chronic kidney disease, with long-term current use of insulin ([facility])  Assessment & Plan  Lab Results   Component Value Date    HGBA1C 8.5 (H) 01/22/2024       Recent Labs     02/26/24  1810 02/26/24  2135 02/27/24  0802 02/27/24  1124   POCGLU 233* 145* 142* 123         Blood Sugar Average: Last 72 hrs:  (P) 160.75  On Lantus 55 units at bedtime and Apidra 25 units 3 times daily with meals.  Continue Lantus 85 units at bedtime, Humalog 25 units 3 times daily with meals and sliding scale  Diabetic diet  hypoGlycemia protocol             VTE Pharmacologic Prophylaxis: VTE Score: 6 High Risk (Score >/= 5) - Pharmacological DVT Prophylaxis Ordered: apixaban (Eliquis). Sequential Compression Devices Ordered.    Mobility:   Basic Mobility Inpatient Raw Score: 18  JH-HLM Goal: 6: Walk 10 steps or more  JH-HLM Achieved: 7: Walk 25 feet or more  HLM Goal achieved. Continue to encourage appropriate mobility.    Patient Centered Rounds: I performed bedside rounds with nursing staff today.   Discussions with Specialists or Other Care Team Provider: barbara delacruz    Education and Discussions with Family / Patient: Patient declined call to .     Total Time Spent on Date of Encounter in care of patient: 60 mins. This time was spent on one or more of the following: performing physical exam; counseling and coordination of care; obtaining or reviewing history; documenting in the medical record; reviewing/ordering tests, medications or procedures; communicating with other healthcare professionals and discussing with patient's family/caregivers.    Current Length of Stay: 1 day(s)  Current Patient Status: Inpatient   Certification Statement: The patient will continue to require additional inpatient hospital stay due to acute on chronic hf   Discharge Plan: Anticipate discharge in 48-72 hrs to home.    Code Status: Level 1 - Full Code    Subjective:     Feeling  better since she came     Objective:     Vitals:   Temp (24hrs), Av.5 °F (36.9 °C), Min:98.2 °F (36.8 °C), Max:98.7 °F (37.1 °C)    Temp:  [98.2 °F (36.8 °C)-98.7 °F (37.1 °C)] 98.5 °F (36.9 °C)  HR:  [] 99  Resp:  [14-20] 18  BP: (100-117)/(53-65) 106/61  SpO2:  [80 %-98 %] 95 %  Body mass index is 48.38 kg/m².     Input and Output Summary (last 24 hours):     Intake/Output Summary (Last 24 hours) at 2024 1254  Last data filed at 2024 1057  Gross per 24 hour   Intake 1182 ml   Output 3150 ml   Net -1968 ml       Physical Exam:   Physical Exam  Vitals and nursing note reviewed.   Constitutional:       General: She is not in acute distress.     Appearance: She is obese. She is not diaphoretic.   HENT:      Head: Normocephalic.   Eyes:      General: No scleral icterus.        Right eye: No discharge.         Left eye: No discharge.   Cardiovascular:      Rate and Rhythm: Normal rate and regular rhythm.      Heart sounds: No murmur heard.  Pulmonary:      Effort: Pulmonary effort is normal. No respiratory distress.      Breath sounds: Normal breath sounds. No wheezing, rhonchi or rales.   Abdominal:      General: There is no distension.      Palpations: Abdomen is soft.      Tenderness: There is no abdominal tenderness. There is no guarding or rebound.   Musculoskeletal:      Cervical back: Normal range of motion.      Right lower leg: Edema present.      Left lower leg: Edema present.   Skin:     General: Skin is warm.   Neurological:      Mental Status: She is alert and oriented to person, place, and time.   Psychiatric:         Mood and Affect: Mood normal.         Behavior: Behavior normal.         Thought Content: Thought content normal.         Judgment: Judgment normal.          Additional Data:     Labs:  Results from last 7 days   Lab Units 24  0441   WBC Thousand/uL 11.04*   HEMOGLOBIN g/dL 9.3*   HEMATOCRIT % 33.3*   PLATELETS Thousands/uL 263   NEUTROS PCT % 71   LYMPHS PCT % 19    MONOS PCT % 9   EOS PCT % 0     Results from last 7 days   Lab Units 02/27/24  0441 02/26/24  1423 02/26/24  1408   SODIUM mmol/L 139  --  139   POTASSIUM mmol/L 3.3*  --  3.6   CHLORIDE mmol/L 94*  --  94*   CO2 mmol/L 36*  --  37*   CO2, I-STAT   --    < >  --    BUN mg/dL 16  --  16   CREATININE mg/dL 0.67  --  0.72   ANION GAP mmol/L 9  --  8   CALCIUM mg/dL 8.3*  --  9.3   ALBUMIN g/dL  --   --  4.1   TOTAL BILIRUBIN mg/dL  --   --  0.39   ALK PHOS U/L  --   --  147*   ALT U/L  --   --  26   AST U/L  --   --  20   GLUCOSE RANDOM mg/dL 103  --  83    < > = values in this interval not displayed.     Results from last 7 days   Lab Units 02/26/24  1408   INR  1.33*     Results from last 7 days   Lab Units 02/27/24  1124 02/27/24  0802 02/26/24  2135 02/26/24  1810   POC GLUCOSE mg/dl 123 142* 145* 233*         Results from last 7 days   Lab Units 02/26/24  1648 02/26/24  1408   LACTIC ACID mmol/L 0.8 2.3*   PROCALCITONIN ng/ml  --  0.06       Lines/Drains:  Invasive Devices       Peripheral Intravenous Line  Duration             Peripheral IV 02/26/24 Right Antecubital <1 day                      Telemetry:  Telemetry Orders (From admission, onward)               24 Hour Telemetry Monitoring  Continuous x 24 Hours (Telem)        Expiring   Question:  Reason for 24 Hour Telemetry  Answer:  Decompensated CHF- and any one of the following: continuous diuretic infusion or total diuretic dose >200 mg daily, associated electrolyte derangement (I.e. K < 3.0), ionotropic drip (continuous infusion), hx of ventricular arrhythmia, or new EF < 35%                     Telemetry Reviewed: Normal Sinus Rhythm and Atrial fibrillation. HR averaging 100  Indication for Continued Telemetry Use: Acute CHF on >200 mg lasix/day or equivalent dose or with new reduced EF.              Imaging: No pertinent imaging reviewed.    Recent Cultures (last 7 days):   Results from last 7 days   Lab Units 02/26/24  1419 02/26/24  1408   BLOOD  CULTURE  Received in Microbiology Lab. Culture in Progress. Received in Microbiology Lab. Culture in Progress.       Last 24 Hours Medication List:   Current Facility-Administered Medications   Medication Dose Route Frequency Provider Last Rate    acetaminophen  650 mg Oral Q6H PRN Rosamaria Miller PA-C      albuterol  2 puff Inhalation Q4H PRN Magdalena Fleming MD      albuterol  2.5 mg Nebulization Q6H PRN Magdalena Fleming MD      apixaban  5 mg Oral BID Magdalena Fleming MD      atorvastatin  40 mg Oral Daily Magdalena Fleming MD      budesonide  0.5 mg Nebulization Q12H Magdalena Fleming MD      bumetanide (BUMEX) 12.5 mg infusion 50 mL  0.5 mg/hr Intravenous Continuous Magdalena Fleming MD 0.5 mg/hr (02/27/24 0116)    DULoxetine  60 mg Oral Daily Magdalena Fleming MD      Empagliflozin  10 mg Oral Daily Magdalena Fleming MD      fluticasone  1 spray Nasal BID Magdalena Fleming MD      insulin glargine  55 Units Subcutaneous HS Magdalena Fleming MD      insulin lispro  1-5 Units Subcutaneous HS Magdalena Fleming MD      insulin lispro  2-12 Units Subcutaneous TID AC Magdalena Fleming MD      insulin lispro  25 Units Subcutaneous TID With Meals Magdalena Fleming MD      loratadine  10 mg Oral Daily PRN Magdalena Fleming MD      LORazepam  0.5 mg Oral Daily PRN Magdalena Fleming MD      metoprolol succinate  100 mg Oral Daily Magdalena Fleming MD      montelukast  10 mg Oral HS Magdalena Fleming MD      pantoprazole  40 mg Oral Early Morning Magdalena Fleming MD      spironolactone  50 mg Oral Daily Magdalena Fleming MD      traZODone  150 mg Oral HS Magdalena Fleming MD          Today, Patient Was Seen By: Magdalena Fleming MD    **Please Note: This note may have been constructed using a voice recognition system.**

## 2024-02-27 NOTE — OCCUPATIONAL THERAPY NOTE
Occupational Therapy Screen Note     Patient Name: Mattie Joy  Today's Date: 2/27/2024  Problem List  Principal Problem:    Acute on chronic respiratory failure (HCC)  Active Problems:    Type 2 diabetes mellitus with stage 2 chronic kidney disease, with long-term current use of insulin (HCC)    Paroxysmal atrial fibrillation (HCC)    Current moderate episode of major depressive disorder without prior episode (HCC)    Acute on chronic diastolic CHF (congestive heart failure) (HCC)    COPD, severe (HCC)              02/27/24 1028   OT Last Visit   OT Visit Date 02/27/24   Note Type   Note type Screen   Additional Comments OT orders received. Chart reviewed. RN reports pt is independent in room. No acute OT needs indicated. Will DC OT orders.       Briana Gonzalez, OTR/L

## 2024-02-27 NOTE — PHYSICAL THERAPY NOTE
PHYSICAL THERAPY SCREEN NOTE      Patient Name: Mattie Joy  Today's Date: 2/27/2024 02/27/24 1207   PT Last Visit   PT Visit Date 02/27/24   Note Type   Note type Screen   Additional Comments PT orders received, chart review performed. Spoke to IFRAH Warren, pt is independently ambulating around room, no acute PT needs at this time, will DC PT       Jaelesa Luther, PT, DPT

## 2024-02-27 NOTE — PLAN OF CARE
Problem: PAIN - ADULT  Goal: Verbalizes/displays adequate comfort level or baseline comfort level  Description: Interventions:  - Encourage patient to monitor pain and request assistance  - Assess pain using appropriate pain scale  - Administer analgesics based on type and severity of pain and evaluate response  - Implement non-pharmacological measures as appropriate and evaluate response  - Consider cultural and social influences on pain and pain management  - Notify physician/advanced practitioner if interventions unsuccessful or patient reports new pain  Outcome: Progressing     Problem: INFECTION - ADULT  Goal: Absence or prevention of progression during hospitalization  Description: INTERVENTIONS:  - Assess and monitor for signs and symptoms of infection  - Monitor lab/diagnostic results  - Monitor all insertion sites, i.e. indwelling lines, tubes, and drains  - Monitor endotracheal if appropriate and nasal secretions for changes in amount and color  - [place] appropriate cooling/warming therapies per order  - Administer medications as ordered  - Instruct and encourage patient and family to use good hand hygiene technique  - Identify and instruct in appropriate isolation precautions for identified infection/condition  Outcome: Progressing

## 2024-02-28 ENCOUNTER — TELEPHONE (OUTPATIENT)
Dept: CARDIOLOGY CLINIC | Facility: CLINIC | Age: 64
End: 2024-02-28

## 2024-02-28 PROBLEM — K59.00 CONSTIPATION: Status: ACTIVE | Noted: 2024-02-28

## 2024-02-28 LAB
ANION GAP SERPL CALCULATED.3IONS-SCNC: 10 MMOL/L
BACTERIA UR CULT: NORMAL
BUN SERPL-MCNC: 16 MG/DL (ref 5–25)
CALCIUM SERPL-MCNC: 8.5 MG/DL (ref 8.4–10.2)
CHLORIDE SERPL-SCNC: 93 MMOL/L (ref 96–108)
CO2 SERPL-SCNC: 33 MMOL/L (ref 21–32)
CREAT SERPL-MCNC: 0.73 MG/DL (ref 0.6–1.3)
ERYTHROCYTE [DISTWIDTH] IN BLOOD BY AUTOMATED COUNT: 19.2 % (ref 11.6–15.1)
GFR SERPL CREATININE-BSD FRML MDRD: 86 ML/MIN/1.73SQ M
GLUCOSE SERPL-MCNC: 138 MG/DL (ref 65–140)
GLUCOSE SERPL-MCNC: 157 MG/DL (ref 65–140)
GLUCOSE SERPL-MCNC: 174 MG/DL (ref 65–140)
GLUCOSE SERPL-MCNC: 188 MG/DL (ref 65–140)
GLUCOSE SERPL-MCNC: 197 MG/DL (ref 65–140)
GLUCOSE SERPL-MCNC: 225 MG/DL (ref 65–140)
HCT VFR BLD AUTO: 38.5 % (ref 34.8–46.1)
HGB BLD-MCNC: 10.7 G/DL (ref 11.5–15.4)
MCH RBC QN AUTO: 18.8 PG (ref 26.8–34.3)
MCHC RBC AUTO-ENTMCNC: 27.8 G/DL (ref 31.4–37.4)
MCV RBC AUTO: 68 FL (ref 82–98)
PLATELET # BLD AUTO: 294 THOUSANDS/UL (ref 149–390)
POTASSIUM SERPL-SCNC: 3.5 MMOL/L (ref 3.5–5.3)
RBC # BLD AUTO: 5.68 MILLION/UL (ref 3.81–5.12)
SODIUM SERPL-SCNC: 136 MMOL/L (ref 135–147)
WBC # BLD AUTO: 10.23 THOUSAND/UL (ref 4.31–10.16)

## 2024-02-28 PROCEDURE — 99232 SBSQ HOSP IP/OBS MODERATE 35: CPT | Performed by: INTERNAL MEDICINE

## 2024-02-28 PROCEDURE — 85027 COMPLETE CBC AUTOMATED: CPT | Performed by: INTERNAL MEDICINE

## 2024-02-28 PROCEDURE — 94640 AIRWAY INHALATION TREATMENT: CPT

## 2024-02-28 PROCEDURE — 80048 BASIC METABOLIC PNL TOTAL CA: CPT | Performed by: INTERNAL MEDICINE

## 2024-02-28 PROCEDURE — 94760 N-INVAS EAR/PLS OXIMETRY 1: CPT

## 2024-02-28 PROCEDURE — 82948 REAGENT STRIP/BLOOD GLUCOSE: CPT

## 2024-02-28 PROCEDURE — 94660 CPAP INITIATION&MGMT: CPT

## 2024-02-28 RX ORDER — POTASSIUM CHLORIDE 20 MEQ/1
40 TABLET, EXTENDED RELEASE ORAL DAILY
Status: DISCONTINUED | OUTPATIENT
Start: 2024-02-28 | End: 2024-03-01

## 2024-02-28 RX ORDER — METOPROLOL SUCCINATE 50 MG/1
150 TABLET, EXTENDED RELEASE ORAL DAILY
Status: DISCONTINUED | OUTPATIENT
Start: 2024-02-29 | End: 2024-02-28

## 2024-02-28 RX ORDER — AMOXICILLIN 250 MG
1 CAPSULE ORAL 2 TIMES DAILY
Status: DISCONTINUED | OUTPATIENT
Start: 2024-02-28 | End: 2024-03-01

## 2024-02-28 RX ORDER — METOPROLOL SUCCINATE 50 MG/1
50 TABLET, EXTENDED RELEASE ORAL ONCE
Status: COMPLETED | OUTPATIENT
Start: 2024-02-28 | End: 2024-02-28

## 2024-02-28 RX ORDER — METOPROLOL SUCCINATE 50 MG/1
100 TABLET, EXTENDED RELEASE ORAL DAILY
Status: DISCONTINUED | OUTPATIENT
Start: 2024-02-29 | End: 2024-03-01

## 2024-02-28 RX ORDER — METOPROLOL SUCCINATE 50 MG/1
50 TABLET, EXTENDED RELEASE ORAL DAILY
Status: DISCONTINUED | OUTPATIENT
Start: 2024-02-28 | End: 2024-02-28

## 2024-02-28 RX ORDER — POLYETHYLENE GLYCOL 3350 17 G/17G
17 POWDER, FOR SOLUTION ORAL DAILY
Status: DISCONTINUED | OUTPATIENT
Start: 2024-02-28 | End: 2024-03-01

## 2024-02-28 RX ADMIN — SENNOSIDES AND DOCUSATE SODIUM 1 TABLET: 8.6; 5 TABLET ORAL at 17:26

## 2024-02-28 RX ADMIN — INSULIN GLARGINE 55 UNITS: 100 INJECTION, SOLUTION SUBCUTANEOUS at 20:53

## 2024-02-28 RX ADMIN — INSULIN LISPRO 2 UNITS: 100 INJECTION, SOLUTION INTRAVENOUS; SUBCUTANEOUS at 20:57

## 2024-02-28 RX ADMIN — ACETAMINOPHEN 650 MG: 325 TABLET, FILM COATED ORAL at 20:52

## 2024-02-28 RX ADMIN — METOPROLOL SUCCINATE 50 MG: 50 TABLET, EXTENDED RELEASE ORAL at 17:26

## 2024-02-28 RX ADMIN — INSULIN LISPRO 25 UNITS: 100 INJECTION, SOLUTION INTRAVENOUS; SUBCUTANEOUS at 08:48

## 2024-02-28 RX ADMIN — INSULIN LISPRO 2 UNITS: 100 INJECTION, SOLUTION INTRAVENOUS; SUBCUTANEOUS at 12:48

## 2024-02-28 RX ADMIN — SENNOSIDES AND DOCUSATE SODIUM 1 TABLET: 8.6; 5 TABLET ORAL at 10:22

## 2024-02-28 RX ADMIN — MONTELUKAST 10 MG: 10 TABLET, FILM COATED ORAL at 20:52

## 2024-02-28 RX ADMIN — POTASSIUM CHLORIDE 40 MEQ: 1500 TABLET, EXTENDED RELEASE ORAL at 10:22

## 2024-02-28 RX ADMIN — BUDESONIDE 0.5 MG: 0.5 INHALANT ORAL at 08:37

## 2024-02-28 RX ADMIN — EMPAGLIFLOZIN 10 MG: 10 TABLET, FILM COATED ORAL at 08:24

## 2024-02-28 RX ADMIN — Medication 0.5 MG/HR: at 20:53

## 2024-02-28 RX ADMIN — METOPROLOL SUCCINATE 50 MG: 50 TABLET, EXTENDED RELEASE ORAL at 10:23

## 2024-02-28 RX ADMIN — APIXABAN 5 MG: 5 TABLET, FILM COATED ORAL at 17:26

## 2024-02-28 RX ADMIN — ATORVASTATIN CALCIUM 40 MG: 40 TABLET, FILM COATED ORAL at 08:18

## 2024-02-28 RX ADMIN — INSULIN LISPRO 2 UNITS: 100 INJECTION, SOLUTION INTRAVENOUS; SUBCUTANEOUS at 08:12

## 2024-02-28 RX ADMIN — INSULIN LISPRO 25 UNITS: 100 INJECTION, SOLUTION INTRAVENOUS; SUBCUTANEOUS at 12:48

## 2024-02-28 RX ADMIN — PANTOPRAZOLE SODIUM 40 MG: 40 TABLET, DELAYED RELEASE ORAL at 05:24

## 2024-02-28 RX ADMIN — METOPROLOL SUCCINATE 50 MG: 50 TABLET, EXTENDED RELEASE ORAL at 09:35

## 2024-02-28 RX ADMIN — FLUTICASONE PROPIONATE 1 SPRAY: 50 SPRAY, METERED NASAL at 08:23

## 2024-02-28 RX ADMIN — INSULIN LISPRO 25 UNITS: 100 INJECTION, SOLUTION INTRAVENOUS; SUBCUTANEOUS at 17:59

## 2024-02-28 RX ADMIN — TRAZODONE HYDROCHLORIDE 150 MG: 150 TABLET ORAL at 20:52

## 2024-02-28 RX ADMIN — BUDESONIDE 0.5 MG: 0.5 INHALANT ORAL at 20:06

## 2024-02-28 RX ADMIN — APIXABAN 5 MG: 5 TABLET, FILM COATED ORAL at 08:19

## 2024-02-28 RX ADMIN — ACETAMINOPHEN 650 MG: 325 TABLET, FILM COATED ORAL at 08:19

## 2024-02-28 RX ADMIN — POLYETHYLENE GLYCOL 3350 17 G: 17 POWDER, FOR SOLUTION ORAL at 10:22

## 2024-02-28 RX ADMIN — SPIRONOLACTONE 50 MG: 25 TABLET ORAL at 08:19

## 2024-02-28 RX ADMIN — DULOXETINE HYDROCHLORIDE 60 MG: 30 CAPSULE, DELAYED RELEASE ORAL at 08:19

## 2024-02-28 NOTE — PROGRESS NOTES
"Cardiology Progress Note   Mattie Joy 63 y.o. female MRN: [id number]    Unit/Bed#: -01 Encounter: [id number]      ASSESSMENT:  Acute on chronic diastolic heart failure  8/15/2023 echo: LVEF 65%, grade 2 diastolic dysfunction, severely elevated RV systolic pressure 61 mmHg  Current diuretic: Bumex 5 BID  Wt as low as 265 in the hospital February 2024  Persistent atrial fibrillation  Thromboembolic PPx: Eliquis 5 twice daily  Rate control: Toprol- daily  COPD with acute exacerbation  Type 2 diabetes  Obesity, BMI 51  Pulmonary hypertension likely group 2/3 from left heart disease, underlying lung disease, OHV    Discussion:   BMP stable overall; replenish for goal K>4 and Mg>2  Net -6.1L overnight. Standing weight today 262lbs  She is intermittently RVR overnight. Per med review, she had not gotten Toprol-XL yesterday and this AM due to soft BP. Modified hold parameters to hold for sbp <100. BP has prevented up titration of her GDMT.  We continue to  her on adherence to a salt/fluid restricted intake.      Plan:   Continue bumex gtt today  Daily BMP and standing weights  Strict I&O charting  Telemetry monitoring   Continue Toprol- mg daily and spironolactone 50 mg daily, Jardiance 10 mg daily.     Subjective:   Patient seen and examined. Overnight events reviewed.     Objective:     Vitals: Blood pressure 106/59, pulse (!) 111, temperature 98.5 °F (36.9 °C), resp. rate (!) 24, height 5' 3\" (1.6 m), weight 119 kg (262 lb), SpO2 97%, not currently breastfeeding., Body mass index is 46.41 kg/m².,   Orthostatic Blood Pressures      Flowsheet Row Most Recent Value   Blood Pressure 106/59 filed at 02/28/2024 0932   Patient Position - Orthostatic VS Sitting filed at 02/27/2024 1605              Intake/Output Summary (Last 24 hours) at 2/28/2024 1211  Last data filed at 2/28/2024 0903  Gross per 24 hour   Intake 298 ml   Output 5850 ml   Net -5552 ml         Physical Exam:    GEN: Mattie Joy " appears well, alert and oriented x 3, pleasant and cooperative   HEENT: Sclera anicteric, conjunctivae pink, mucous membranes moist. Oropharynx clear.   NECK: supple, no significant JVD, Trachea midline, no thyromegaly.   HEART: regular rhythm, normal S1 and S2, no murmurs, clicks, gallops or rubs   LUNGS: clear to auscultation bilaterally; no wheezes, rales, or rhonchi. No increased work of breathing or signs of respiratory distress.   ABDOMEN: Soft, nontender, nondistended  EXTREMITIES: Skin warm and well perfused, no clubbing, cyanosis, or edema.  NEURO: No focal findings. Normal speech. Mood and affect normal.   SKIN: Normal without suspicious lesions on exposed skin.      Medications:      Current Facility-Administered Medications:     acetaminophen (TYLENOL) tablet 650 mg, 650 mg, Oral, Q6H PRN, Rosamaria Miller PA-C, 650 mg at 02/28/24 0819    albuterol (PROVENTIL HFA,VENTOLIN HFA) inhaler 2 puff, 2 puff, Inhalation, Q4H PRN, Magdalena Fleming MD, 2 puff at 02/26/24 1822    albuterol inhalation solution 2.5 mg, 2.5 mg, Nebulization, Q6H PRN, Magdalena Fleming MD    apixaban (ELIQUIS) tablet 5 mg, 5 mg, Oral, BID, Magdalena Fleming MD, 5 mg at 02/28/24 0819    atorvastatin (LIPITOR) tablet 40 mg, 40 mg, Oral, Daily, Magdalena Fleming MD, 40 mg at 02/28/24 0818    budesonide (PULMICORT) inhalation solution 0.5 mg, 0.5 mg, Nebulization, Q12H, Magdalena Fleming MD, 0.5 mg at 02/28/24 0837    bumetanide (BUMEX) 12.5 mg infusion 50 mL, 0.5 mg/hr, Intravenous, Continuous, Magdalena Fleming MD, Last Rate: 2 mL/hr at 02/27/24 2118, 0.5 mg/hr at 02/27/24 2118    DULoxetine (CYMBALTA) delayed release capsule 60 mg, 60 mg, Oral, Daily, Magdalena Fleming MD, 60 mg at 02/28/24 0819    Empagliflozin (JARDIANCE) tablet 10 mg, 10 mg, Oral, Daily, Magdalena Fleming MD, 10 mg at 02/28/24 0824    fluticasone (FLONASE) 50 mcg/act nasal spray 1 spray, 1 spray, Nasal, BID, Magdalena Fleming MD, 1 spray at  02/28/24 0823    insulin glargine (LANTUS) subcutaneous injection 55 Units 0.55 mL, 55 Units, Subcutaneous, HS, Magdalena Fleming MD, 55 Units at 02/27/24 2039    insulin lispro (HumaLOG) 100 units/mL subcutaneous injection 1-5 Units, 1-5 Units, Subcutaneous, HS, Magdalena Fleming MD    insulin lispro (HumaLOG) 100 units/mL subcutaneous injection 2-12 Units, 2-12 Units, Subcutaneous, TID AC, 2 Units at 02/28/24 0812 **AND** Fingerstick Glucose (POCT), , , TID AC, Magdalena Fleming MD    insulin lispro (HumaLOG) 100 units/mL subcutaneous injection 25 Units, 25 Units, Subcutaneous, TID With Meals, Magdalena Fleming MD, 25 Units at 02/28/24 0848    loratadine (CLARITIN) tablet 10 mg, 10 mg, Oral, Daily PRN, Magdalena Fleming MD    LORazepam (ATIVAN) tablet 0.5 mg, 0.5 mg, Oral, Daily PRN, Magdalena Fleming MD    [START ON 2/29/2024] metoprolol succinate (TOPROL-XL) 24 hr tablet 100 mg, 100 mg, Oral, Daily, Fran John PA-C    montelukast (SINGULAIR) tablet 10 mg, 10 mg, Oral, HS, Magdalena Fleming MD, 10 mg at 02/27/24 2040    pantoprazole (PROTONIX) EC tablet 40 mg, 40 mg, Oral, Early Morning, Magdalena Fleming MD, 40 mg at 02/28/24 0524    polyethylene glycol (MIRALAX) packet 17 g, 17 g, Oral, Daily, Magdalena Fleming MD, 17 g at 02/28/24 1022    potassium chloride (Klor-Con M20) CR tablet 40 mEq, 40 mEq, Oral, Daily, Magdalena Fleming MD, 40 mEq at 02/28/24 1022    senna-docusate sodium (SENOKOT S) 8.6-50 mg per tablet 1 tablet, 1 tablet, Oral, BID, Magdalena Fleming MD, 1 tablet at 02/28/24 1022    spironolactone (ALDACTONE) tablet 50 mg, 50 mg, Oral, Daily, Magdalena Fleming MD, 50 mg at 02/28/24 0819    traZODone (DESYREL) tablet 150 mg, 150 mg, Oral, HS, Magdalena Fleming MD, 150 mg at 02/27/24 2040     Labs & Results:        Results from last 7 days   Lab Units 02/28/24  0229 02/27/24  0441 02/26/24  1423 02/26/24  1408   WBC Thousand/uL 10.23* 11.04*  --  12.18*    HEMOGLOBIN g/dL 10.7* 9.3*  --  9.8*   I STAT HEMOGLOBIN g/dl  --   --  11.9  --    HEMATOCRIT % 38.5 33.3*  --  35.1   HEMATOCRIT, ISTAT %  --   --  35  --    PLATELETS Thousands/uL 294 263  --  301         Results from last 7 days   Lab Units 02/28/24  0229 02/27/24  0441 02/26/24  1423 02/26/24  1408   POTASSIUM mmol/L 3.5 3.3*  --  3.6   CHLORIDE mmol/L 93* 94*  --  94*   CO2 mmol/L 33* 36*  --  37*   CO2, I-STAT mmol/L  --   --  33*  --    BUN mg/dL 16 16  --  16   CREATININE mg/dL 0.73 0.67  --  0.72   CALCIUM mg/dL 8.5 8.3*  --  9.3   ALK PHOS U/L  --   --   --  147*   ALT U/L  --   --   --  26   AST U/L  --   --   --  20   GLUCOSE, ISTAT mg/dl  --   --  87  --      Results from last 7 days   Lab Units 02/26/24  1408   INR  1.33*   PTT seconds 32

## 2024-02-28 NOTE — PLAN OF CARE

## 2024-02-28 NOTE — ASSESSMENT & PLAN NOTE
At baseline on 4 L nasal cannula oxygen secondary to severe COPD, pulmonary hypertension  Required up to 7 L nasal cannula oxygen on presentation  Likely decompensated secondary to CHF  Flu/COVID/RSV negative  Monitor with diuresis    Currently saturating fine at 4 L nasal cannula oxygen which is her baseline

## 2024-02-28 NOTE — PLAN OF CARE
Problem: PAIN - ADULT  Goal: Verbalizes/displays adequate comfort level or baseline comfort level  Description: Interventions:  - Encourage patient to monitor pain and request assistance  - Assess pain using appropriate pain scale  - Administer analgesics based on type and severity of pain and evaluate response  - Implement non-pharmacological measures as appropriate and evaluate response  - Consider cultural and social influences on pain and pain management  - Notify physician/advanced practitioner if interventions unsuccessful or patient reports new pain  Outcome: Progressing     Problem: INFECTION - ADULT  Goal: Absence or prevention of progression during hospitalization  Description: INTERVENTIONS:  - Assess and monitor for signs and symptoms of infection  - Monitor lab/diagnostic results  - Monitor all insertion sites, i.e. indwelling lines, tubes, and drains  - Monitor endotracheal if appropriate and nasal secretions for changes in amount and color  - [place] appropriate cooling/warming therapies per order  - Administer medications as ordered  - Instruct and encourage patient and family to use good hand hygiene technique  - Identify and instruct in appropriate isolation precautions for identified infection/condition  Outcome: Progressing     Problem: SAFETY ADULT  Goal: Patient will remain free of falls  Description: INTERVENTIONS:  - Educate patient/family on patient safety including physical limitations  - Instruct patient to call for assistance with activity   - Consult OT/PT to assist with strengthening/mobility   - Keep Call bell within reach  - Keep bed low and locked with side rails adjusted as appropriate  - Keep care items and personal belongings within reach  - Initiate and maintain comfort rounds  - Make Fall Risk Sign visible to staff  - Obtain necessary fall risk management equipment: non-slip socks  - Apply yellow socks and bracelet for high fall risk patients  - Consider moving patient to room  near nurses station  Outcome: Progressing  Goal: Maintain or return to baseline ADL function  Description: INTERVENTIONS:  - Educate patient/family on patient safety including physical limitations  - Instruct patient to call for assistance with activity   - Consult OT/PT to assist with strengthening/mobility   - Keep Call bell within reach  - Keep bed low and locked with side rails adjusted as appropriate  - Keep care items and personal belongings within reach  - Initiate and maintain comfort rounds  - Make Fall Risk Sign visible to staff  - Obtain necessary fall risk management equipment: non-slip socks  - Apply yellow socks and bracelet for high fall risk patients  - Consider moving patient to room near nurses station  Outcome: Progressing  Goal: Maintains/Returns to pre admission functional level  Description: INTERVENTIONS:  - Perform AM-PAC 6 Click Basic Mobility/ Daily Activity assessment daily.  - Set and communicate daily mobility goal to care team and patient/family/caregiver.   - Collaborate with rehabilitation services on mobility goals if consulted  - Perform Range of Motion 3 times a day.  - Encourage patient to reposition every 2 hours.  - Ambulate patient 3 times a day  - Out of bed to chair 3 times a day for meals  - Out of bed for toileting  - Record patient progress and toleration of activity level   Outcome: Progressing     Problem: DISCHARGE PLANNING  Goal: Discharge to home or other facility with appropriate resources  Description: INTERVENTIONS:  - Identify barriers to discharge w/patient and caregiver  - Arrange for needed discharge resources and transportation as appropriate  - Identify discharge learning needs (meds, wound care, etc.)  - Arrange for interpretive services to assist at discharge as needed  - Refer to Case Management Department for coordinating discharge planning if the patient needs post-hospital services based on physician/advanced practitioner order or complex needs related  to functional status, cognitive ability, or social support system  Outcome: Progressing     Problem: Knowledge Deficit  Goal: Patient/family/caregiver demonstrates understanding of disease process, treatment plan, medications, and discharge instructions  Description: Complete learning assessment and assess knowledge base.  Interventions:  - Provide teaching at level of understanding  - Provide teaching via preferred learning methods  Outcome: Progressing     Problem: CARDIOVASCULAR - ADULT  Goal: Maintains optimal cardiac output and hemodynamic stability  Description: INTERVENTIONS:  - Monitor I/O, vital signs and rhythm  - Monitor for S/S and trends of decreased cardiac output  - Administer and titrate ordered vasoactive medications to optimize hemodynamic stability  - Assess quality of pulses, skin color and temperature  - Assess for signs of decreased coronary artery perfusion  - Instruct patient to report change in severity of symptoms  Outcome: Progressing  Goal: Absence of cardiac dysrhythmias or at baseline rhythm  Description: INTERVENTIONS:  - Continuous cardiac monitoring, vital signs, obtain 12 lead EKG if ordered  - Administer antiarrhythmic and heart rate control medications as ordered  - Monitor electrolytes and administer replacement therapy as ordered  Outcome: Progressing     Problem: RESPIRATORY - ADULT  Goal: Achieves optimal ventilation and oxygenation  Description: INTERVENTIONS:  - Assess for changes in respiratory status  - Assess for changes in mentation and behavior  - Position to facilitate oxygenation and minimize respiratory effort  - Oxygen administered by appropriate delivery if ordered  - Initiate smoking cessation education as indicated  - Encourage broncho-pulmonary hygiene including cough, deep breathe, Incentive Spirometry  - Assess the need for suctioning and aspirate as needed  - Assess and instruct to report SOB or any respiratory difficulty  - Respiratory Therapy support as  indicated  Outcome: Progressing     Problem: METABOLIC, FLUID AND ELECTROLYTES - ADULT  Goal: Electrolytes maintained within normal limits  Description: INTERVENTIONS:  - Monitor labs and assess patient for signs and symptoms of electrolyte imbalances  - Administer electrolyte replacement as ordered  - Monitor response to electrolyte replacements, including repeat lab results as appropriate  - Instruct patient on fluid and nutrition as appropriate  Outcome: Progressing  Goal: Fluid balance maintained  Description: INTERVENTIONS:  - Monitor labs   - Monitor I/O and WT  - Instruct patient on fluid and nutrition as appropriate  - Assess for signs & symptoms of volume excess or deficit  Outcome: Progressing

## 2024-02-28 NOTE — PROGRESS NOTES
24 1100   Clinical Encounter Type   Visited With Patient;Health care provider   Routine Visit Follow-up   Sikhism Encounters   Sikhism Needs Prayer      Pastoral Care Progress Note    2024  Patient: Mattie Joy : 1960  Admission Date & Time: 2024 1346  MRN: [id number] CSN: [id number]         follow-up after previous hospital stay. Mattie opened up about not taking care of herself well re: salt, diet and water.  encouraged patient in making appropriate decisions for herself and following medical guidance. Offered prayer and patient accepted. Chaplains remain available.

## 2024-02-28 NOTE — ASSESSMENT & PLAN NOTE
Lab Results   Component Value Date    HGBA1C 8.5 (H) 01/22/2024       Recent Labs     02/28/24  0234 02/28/24  0722 02/28/24  1106 02/28/24  1650   POCGLU 188* 197* 157* 138         Blood Sugar Average: Last 72 hrs:  (P) 160.3  On Lantus 55 units at bedtime and Apidra 25 units 3 times daily with meals.  Continue Lantus 85 units at bedtime, Humalog 25 units 3 times daily with meals and sliding scale  Diabetic diet  hypoGlycemia protocol

## 2024-02-28 NOTE — ASSESSMENT & PLAN NOTE
On metoprolol 100 mg daily and Eliquis 5 twice daily  Patient did not receive metoprolol yesterday and this morning because of soft blood pressure.  Discussed with cardiology, will continue 100 mg metoprolol for now and monitor if we will need to increase the dose.  Continue

## 2024-02-28 NOTE — PROGRESS NOTES
[facility]  Progress Note  Name: Mattie Joy I  MRN: [id number]  Unit/Bed#: -01 I Date of Admission: 2/26/2024   Date of Service: 2/28/2024 I Hospital Day: 2    Assessment/Plan   * Acute on chronic respiratory failure (HCC)  Assessment & Plan  At baseline on 4 L nasal cannula oxygen secondary to severe COPD, pulmonary hypertension  Required up to 7 L nasal cannula oxygen on presentation  Likely decompensated secondary to CHF  Flu/COVID/RSV negative  Monitor with diuresis    Currently saturating fine at 4 L nasal cannula oxygen which is her baseline    Acute on chronic diastolic CHF (congestive heart failure) (HCC)  Assessment & Plan  Wt Readings from Last 3 Encounters:   02/28/24 119 kg (262 lb)   02/26/24 127 kg (279 lb)   02/26/24 127 kg (279 lb)     Seen in the cardiology office 2/26  Approximately 15 pounds weight gain in the past 2 weeks.  Noncompliant with salt diet  On Bumex 6 mg twice daily recently increased   patient obese  Chest x-ray with congestion  Last echo August 2023 showed LVEF 65% diastolic dysfunction 2, R VSP 61 mmHg  Started on Bumex drip, had 7 L urine output/24 hours / -6.1/24 hours    Plan:   Continue Bumex drip  Strict intake output  Daily weights  Low-salt diet  Cardiology consulted  Continue spironolactone, metoprolol and Jardiance                Constipation  Assessment & Plan  Start bowel regimen    COPD, severe ([facility])  Assessment & Plan  Followed with pulmonology.  On albuterol nebulizers as needed, Pulmicort, Claritin, Singulair, bevespi   Noncompliant with medication.  Continue Pulmicort/albuterol, Claritin, Singulair    Not in COPD exacerbation currently    Morbid obesity (HCC)  Assessment & Plan  Lifestyle changes encouraged     Current moderate episode of major depressive disorder without prior episode ([facility])  Assessment & Plan  On Cymbalta, trazodone and Lexapro.  Reported Lexapro was discontinued  Continue Cymbalta and  trazodone    Paroxysmal atrial fibrillation (HCC)  Assessment & Plan  On metoprolol 100 mg daily and Eliquis 5 twice daily  Patient did not receive metoprolol yesterday and this morning because of soft blood pressure.  Discussed with cardiology, will continue 100 mg metoprolol for now and monitor if we will need to increase the dose.  Continue    Type 2 diabetes mellitus with stage 2 chronic kidney disease, with long-term current use of insulin (HCC)  Assessment & Plan  Lab Results   Component Value Date    HGBA1C 8.5 (H) 01/22/2024       Recent Labs     02/28/24  0234 02/28/24  0722 02/28/24  1106 02/28/24  1650   POCGLU 188* 197* 157* 138         Blood Sugar Average: Last 72 hrs:  (P) 160.3  On Lantus 55 units at bedtime and Apidra 25 units 3 times daily with meals.  Continue Lantus 85 units at bedtime, Humalog 25 units 3 times daily with meals and sliding scale  Diabetic diet  hypoGlycemia protocol           VTE Pharmacologic Prophylaxis: VTE Score: 6 High Risk (Score >/= 5) - Pharmacological DVT Prophylaxis Ordered: apixaban (Eliquis). Sequential Compression Devices Ordered.    Mobility:   Basic Mobility Inpatient Raw Score: 23  JH-HLM Goal: 7: Walk 25 feet or more  JH-HLM Achieved: 7: Walk 25 feet or more  HLM Goal achieved. Continue to encourage appropriate mobility.    Patient Centered Rounds: I performed bedside rounds with nursing staff today.   Discussions with Specialists or Other Care Team Provider: cm, cardiology    Education and Discussions with Family / Patient: Patient declined call to .     Total Time Spent on Date of Encounter in care of patient: 40 mins. This time was spent on one or more of the following: performing physical exam; counseling and coordination of care; obtaining or reviewing history; documenting in the medical record; reviewing/ordering tests, medications or procedures; communicating with other healthcare professionals and discussing with patient's  family/caregivers.    Current Length of Stay: 2 day(s)  Current Patient Status: Inpatient   Certification Statement: The patient will continue to require additional inpatient hospital stay due to acute on chronic HF   Discharge Plan: Anticipate discharge in 48 hrs to home.    Code Status: Level 1 - Full Code    Subjective:     Feeling better since admission. Had no bm for a few days     Objective:     Vitals:   Temp (24hrs), Av.5 °F (36.9 °C), Min:98.2 °F (36.8 °C), Max:98.8 °F (37.1 °C)    Temp:  [98.2 °F (36.8 °C)-98.8 °F (37.1 °C)] 98.8 °F (37.1 °C)  HR:  [105-161] 110  Resp:  [18-24] 20  BP: (100-127)/(55-93) 100/55  SpO2:  [90 %-97 %] 97 %  Body mass index is 46.41 kg/m².     Input and Output Summary (last 24 hours):     Intake/Output Summary (Last 24 hours) at 2024 1702  Last data filed at 2024 1601  Gross per 24 hour   Intake 838 ml   Output 5650 ml   Net -4812 ml       Physical Exam:   Physical Exam  Vitals and nursing note reviewed.   Constitutional:       General: She is not in acute distress.     Appearance: She is obese. She is not diaphoretic.   HENT:      Head: Normocephalic.   Eyes:      General: No scleral icterus.        Right eye: No discharge.         Left eye: No discharge.   Cardiovascular:      Rate and Rhythm: Normal rate and regular rhythm.      Heart sounds: No murmur heard.  Pulmonary:      Effort: Pulmonary effort is normal. No respiratory distress.      Breath sounds: Normal breath sounds. No wheezing, rhonchi or rales.   Abdominal:      General: There is no distension.      Palpations: Abdomen is soft.      Tenderness: There is no abdominal tenderness. There is no guarding or rebound.   Musculoskeletal:      Cervical back: Normal range of motion.      Right lower leg: Edema present.      Left lower leg: Edema present.   Skin:     General: Skin is warm.   Neurological:      Mental Status: She is alert and oriented to person, place, and time.   Psychiatric:         Mood and  Affect: Mood normal.         Behavior: Behavior normal.         Thought Content: Thought content normal.         Judgment: Judgment normal.          Additional Data:     Labs:  Results from last 7 days   Lab Units 24  0229 24  0441   WBC Thousand/uL 10.23* 11.04*   HEMOGLOBIN g/dL 10.7* 9.3*   HEMATOCRIT % 38.5 33.3*   PLATELETS Thousands/uL 294 263   NEUTROS PCT %  --  71   LYMPHS PCT %  --  19   MONOS PCT %  --  9   EOS PCT %  --  0     Results from last 7 days   Lab Units 24  0229 24  1423 24  1408   SODIUM mmol/L 136   < > 139   POTASSIUM mmol/L 3.5   < > 3.6   CHLORIDE mmol/L 93*   < > 94*   CO2 mmol/L 33*   < > 37*   CO2, I-STAT   --    < >  --    BUN mg/dL 16   < > 16   CREATININE mg/dL 0.73   < > 0.72   ANION GAP mmol/L 10   < > 8   CALCIUM mg/dL 8.5   < > 9.3   ALBUMIN g/dL  --   --  4.1   TOTAL BILIRUBIN mg/dL  --   --  0.39   ALK PHOS U/L  --   --  147*   ALT U/L  --   --  26   AST U/L  --   --  20   GLUCOSE RANDOM mg/dL 174*   < > 83    < > = values in this interval not displayed.     Results from last 7 days   Lab Units 24  1408   INR  1.33*     Results from last 7 days   Lab Units 24  1650 24  1106 24  0722 24  0234 24  2038 24  1603 24  1124 24  0802 24  2135 24  1810   POC GLUCOSE mg/dl 138 157* 197* 188* 127 153* 123 142* 145* 233*         Results from last 7 days   Lab Units 24  1648 24  1408   LACTIC ACID mmol/L 0.8 2.3*   PROCALCITONIN ng/ml  --  0.06       Lines/Drains:  Invasive Devices       Peripheral Intravenous Line  Duration             Peripheral IV 24 Right Antecubital 2 days                      Telemetry:  Telemetry Orders (From admission, onward)               24 Hour Telemetry Monitoring  Continuous x 24 Hours (Telem)           Question:  Reason for 24 Hour Telemetry  Answer:  Decompensated CHF- and any one of the following: continuous diuretic infusion or total  diuretic dose >200 mg daily, associated electrolyte derangement (I.e. K < 3.0), ionotropic drip (continuous infusion), hx of ventricular arrhythmia, or new EF < 35%                     Telemetry Reviewed: Atrial fibrillation. HR averaging 115  Indication for Continued Telemetry Use: Acute CHF on >200 mg lasix/day or equivalent dose or with new reduced EF.              Imaging: No pertinent imaging reviewed.    Recent Cultures (last 7 days):   Results from last 7 days   Lab Units 02/26/24  2120 02/26/24  1419 02/26/24  1408   BLOOD CULTURE   --  No Growth at 24 hrs. No Growth at 24 hrs.   URINE CULTURE  40,000-49,000 cfu/ml  --   --        Last 24 Hours Medication List:   Current Facility-Administered Medications   Medication Dose Route Frequency Provider Last Rate    acetaminophen  650 mg Oral Q6H PRN Rosamaria Miller PA-C      albuterol  2 puff Inhalation Q4H PRN Magdalena Fleming MD      albuterol  2.5 mg Nebulization Q6H PRN Magdalena Flemign MD      apixaban  5 mg Oral BID Magdalena Fleming MD      atorvastatin  40 mg Oral Daily Magdalena Fleming MD      budesonide  0.5 mg Nebulization Q12H Magdalena Fleming MD      bumetanide (BUMEX) 12.5 mg infusion 50 mL  0.5 mg/hr Intravenous Continuous Magdalena Fleming MD 0.5 mg/hr (02/27/24 2118)    DULoxetine  60 mg Oral Daily Magdalena Fleming MD      Empagliflozin  10 mg Oral Daily Magdalena Fleming MD      fluticasone  1 spray Nasal BID Magdalena Fleming MD      insulin glargine  55 Units Subcutaneous HS Magdalena Fleming MD      insulin lispro  1-5 Units Subcutaneous HS Magdalena Fleming MD      insulin lispro  2-12 Units Subcutaneous TID AC Magdalena Fleming MD      insulin lispro  25 Units Subcutaneous TID With Meals Magdalena Fleming MD      loratadine  10 mg Oral Daily PRN Magdalena Fleming MD      LORazepam  0.5 mg Oral Daily PRN Magdalena Fleming MD      [START ON 2/29/2024] metoprolol succinate  100 mg Oral Daily Fran LAUREANO  ROSALIO John      metoprolol succinate  50 mg Oral Once Magdalena Fleming MD      montelukast  10 mg Oral HS Magdalena Fleming MD      pantoprazole  40 mg Oral Early Morning Magdalena Fleming MD      polyethylene glycol  17 g Oral Daily Magdalena Fleming MD      potassium chloride  40 mEq Oral Daily Magdalena Fleming MD      senna-docusate sodium  1 tablet Oral BID Magdalena Fleming MD      spironolactone  50 mg Oral Daily Magdalena Fleming MD      traZODone  150 mg Oral HS Magdalena Fleming MD          Today, Patient Was Seen By: Magdalena Fleming MD    **Please Note: This note may have been constructed using a voice recognition system.**

## 2024-02-28 NOTE — PLAN OF CARE
Problem: Potential for Falls  Goal: Patient will remain free of falls  Description: INTERVENTIONS:  - Educate patient/family on patient safety including physical limitations  - Instruct patient to call for assistance with activity   - Consult OT/PT to assist with strengthening/mobility   - Keep Call bell within reach  - Keep bed low and locked with side rails adjusted as appropriate  - Keep care items and personal belongings within reach  - Initiate and maintain comfort rounds  - Make Fall Risk Sign visible to staff  - Offer Toileting every 2 Hours, in advance of need  - Initiate/Maintain 2alarm  - Obtain necessary fall risk management equipment: 2  - Apply yellow socks and bracelet for high fall risk patients  - Consider moving patient to room near nurses station  2/27/2024 2004 by Faith Sutherland RN  Outcome: Progressing  2/27/2024 2004 by Faith Sutherland RN  Outcome: Progressing     Problem: PAIN - ADULT  Goal: Verbalizes/displays adequate comfort level or baseline comfort level  Description: Interventions:  - Encourage patient to monitor pain and request assistance  - Assess pain using appropriate pain scale  - Administer analgesics based on type and severity of pain and evaluate response  - Implement non-pharmacological measures as appropriate and evaluate response  - Consider cultural and social influences on pain and pain management  - Notify physician/advanced practitioner if interventions unsuccessful or patient reports new pain  2/27/2024 2004 by Faith Sutherland RN  Outcome: Progressing  2/27/2024 2004 by Faith Sutherland RN  Outcome: Progressing     Problem: INFECTION - ADULT  Goal: Absence or prevention of progression during hospitalization  Description: INTERVENTIONS:  - Assess and monitor for signs and symptoms of infection  - Monitor lab/diagnostic results  - Monitor all insertion sites, i.e. indwelling lines, tubes, and drains  - Monitor endotracheal if appropriate and nasal  secretions for changes in amount and color  - [place] appropriate cooling/warming therapies per order  - Administer medications as ordered  - Instruct and encourage patient and family to use good hand hygiene technique  - Identify and instruct in appropriate isolation precautions for identified infection/condition  2/27/2024 2004 by Faith Sutherland RN  Outcome: Progressing  2/27/2024 2004 by Faith Sutherland RN  Outcome: Progressing  Goal: Absence of fever/infection during neutropenic period  Description: INTERVENTIONS:  - Monitor WBC    2/27/2024 2004 by Faith Sutherland RN  Outcome: Progressing  2/27/2024 2004 by Faith Sutherland RN  Outcome: Progressing     Problem: SAFETY ADULT  Goal: Patient will remain free of falls  Description: INTERVENTIONS:  - Educate patient/family on patient safety including physical limitations  - Instruct patient to call for assistance with activity   - Consult OT/PT to assist with strengthening/mobility   - Keep Call bell within reach  - Keep bed low and locked with side rails adjusted as appropriate  - Keep care items and personal belongings within reach  - Initiate and maintain comfort rounds  - Make Fall Risk Sign visible to staff  - Offer Toileting every 2 Hours, in advance of need  - Initiate/Maintain 2alarm  - Obtain necessary fall risk management equipment: 2  - Apply yellow socks and bracelet for high fall risk patients  - Consider moving patient to room near nurses station  2/27/2024 2004 by Faith Sutherland RN  Outcome: Progressing  2/27/2024 2004 by Faith Sutherland RN  Outcome: Progressing  Goal: Maintain or return to baseline ADL function  Description: INTERVENTIONS:  -  Assess patient's ability to carry out ADLs; assess patient's baseline for ADL function and identify physical deficits which impact ability to perform ADLs (bathing, care of mouth/teeth, toileting, grooming, dressing, etc.)  - Assess/evaluate cause of self-care deficits   - Assess  range of motion  - Assess patient's mobility; develop plan if impaired  - Assess patient's need for assistive devices and provide as appropriate  - Encourage maximum independence but intervene and supervise when necessary  - Involve family in performance of ADLs  - Assess for home care needs following discharge   - Consider OT consult to assist with ADL evaluation and planning for discharge  - Provide patient education as appropriate  2/27/2024 2004 by Faith Sutherland RN  Outcome: Progressing  2/27/2024 2004 by Faith Sutherland RN  Outcome: Progressing  Goal: Maintains/Returns to pre admission functional level  Description: INTERVENTIONS:  - Perform AM-PAC 6 Click Basic Mobility/ Daily Activity assessment daily.  - Set and communicate daily mobility goal to care team and patient/family/caregiver.   - Collaborate with rehabilitation services on mobility goals if consulted  - Perform Range of Motion 2 times a day.  - Reposition patient every 2 hours.  - Dangle patient 2 times a day  - Stand patient 2 times a day  - Ambulate patient 2 times a day  - Out of bed to chair 2 times a day   - Out of bed for meals 2 times a day  - Out of bed for toileting  - Record patient progress and toleration of activity level   2/27/2024 2004 by Faith Sutherland RN  Outcome: Progressing  2/27/2024 2004 by Faith Sutherland RN  Outcome: Progressing     Problem: DISCHARGE PLANNING  Goal: Discharge to home or other facility with appropriate resources  Description: INTERVENTIONS:  - Identify barriers to discharge w/patient and caregiver  - Arrange for needed discharge resources and transportation as appropriate  - Identify discharge learning needs (meds, wound care, etc.)  - Arrange for interpretive services to assist at discharge as needed  - Refer to Case Management Department for coordinating discharge planning if the patient needs post-hospital services based on physician/advanced practitioner order or complex needs related  to functional status, cognitive ability, or social support system  2/27/2024 2004 by Faith Sutherland RN  Outcome: Progressing  2/27/2024 2004 by Faith Sutherland RN  Outcome: Progressing     Problem: Knowledge Deficit  Goal: Patient/family/caregiver demonstrates understanding of disease process, treatment plan, medications, and discharge instructions  Description: Complete learning assessment and assess knowledge base.  Interventions:  - Provide teaching at level of understanding  - Provide teaching via preferred learning methods  2/27/2024 2004 by Faith Sutherland RN  Outcome: Progressing  2/27/2024 2004 by Faith Sutherland RN  Outcome: Progressing

## 2024-02-28 NOTE — CASE MANAGEMENT
Case Management Progress Note    Patient name Mattie Hliton /-01 MRN [id number]  : 1960 Date 2024       LOS (days): 2  Geometric Mean LOS (GMLOS) (days): 3.9  Days to GMLOS:2.1        OBJECTIVE:        Current admission status: Inpatient  [place] Pharm[place]   [facility] [address]  [address] [address]  Phone: [phone] Fax: [phone]    [facility] - [place], [place] - [address] [address] [facility]  [address]  [address]  [place] [address]  Phone: [phone] Fax: [phone]    Primary Care Provider: Laith Olivares MD    Primary Insurance: MEDICARE  Secondary Insurance: AETNA    PROGRESS NOTE:    [facility] accepted. CM added [facility] to pt's dc instructions.

## 2024-02-28 NOTE — TELEPHONE ENCOUNTER
Sent electronic order to Dr. Lo and sent email to Umesh of [facility] . New pt for HFMS.

## 2024-02-28 NOTE — PROGRESS NOTES
Name: Mattie Joy      : 1960      MRN: [id number]  Encounter Provider: Laith Olivares MD  Encounter Date: 2024   Encounter department: [facility] [address]     Assessment & Plan     1. Current moderate episode of major depressive disorder without prior episode (HCC)    Monitor. Sytmposm due to chronic illness.   2. Acute exacerbation of CHF (congestive heart failure) ([facility])  -     bumetanide (BUMEX) 2 mg tablet; Take 3 tablets (6 mg total) by mouth 2 (two) times a day    Has had increase weight gain with sob.   Continue with bumex at 6 mg bid.     Increase aldactone to 50 mg daily.     ER precautions.   3. COPD with acute exacerbation (HCC)  -     spironolactone (ALDACTONE) 50 mg tablet; Take 1 tablet (50 mg total) by mouth daily    Exacerbation appears improved.   On o2.   Clear to auscultation today.     Er precautions.   4. Acute on chronic diastolic heart failure (HCC)  -     spironolactone (ALDACTONE) 50 mg tablet; Take 1 tablet (50 mg total) by mouth daily  -     Basic metabolic panel; Future  -     Magnesium; Future  -     Basic metabolic panel  -     Magnesium           Subjective      TCM Call     Date and time call was made  2024 10:29 AM    Hospital care reviewed  Records reviewed    Patient was hospitialized at  [facility]    Date of Admission  24    Date of discharge  24    Diagnosis  Acute on chronic hypercapnic respiratory failure    Disposition  Home; Home health services    Were the patients medications reviewed and updated  Yes    Current Symptoms  None    Cough Severity  Mild    Loose stool severity  Severe    Shortness of breath severity  Moderate  Advised to follow-up with   pulmonary    Weakness severity  Moderate    Neausea severity  Moderate    Fatigue severity  Moderate      TCM Call     Post hospital issues  None    Should patient be enrolled in anticoag monitoring?  Yes    Scheduled for follow up?  Yes  patient  declined appt at this time. will   call when she is able to come in.    Patients specialists  Cardiologist; Pulmonlolgist    Referrals needed  GI   6 weeks    Did you obtain your prescribed medications  Yes    Do you need help managing your prescriptions or medications  No    Is transportation to your appointment needed  No    I have advised the patient to call PCP with any new or worsening symptoms    jos reeves ma    Living Arrangements  Alone    Support System  Friends    Do you have social support  Yes, as much as I need    Are you recieving any outpatient services  No    Are you recieving home care services  No    Types of home care services  Nurse visit    Are you using any community resources  No    Current waiver services  No    Have you fallen in the last 12 months  No    Interperter language line needed  No    Counseling  Patient       Ivy is seen for TCM. Ws hospitalized for acute on chronic chf with undelrying severe copd, dm, htn, depression.   Has some sob but better than when she was in the hospital. Continues on o2 at 4 L. Recently seen by pulmonary medicine. Sob on exertion. Does not follow a fluid restriction.  Reviewed hospital records. Reviewed imaging and labs.   Reviewed mediations. She has palliative medicine seeing her at home, this is monthly. Not interested in Hospice servies and is a full code.            Review of Systems   Constitutional:  Positive for activity change and fatigue. Negative for appetite change, chills, diaphoresis and fever.   HENT:  Negative for congestion, facial swelling and sore throat.    Respiratory:  Positive for shortness of breath. Negative for apnea, cough and chest tightness.    Cardiovascular: Negative.  Negative for chest pain and palpitations.   Gastrointestinal: Negative.  Negative for abdominal distention, abdominal pain, blood in stool, constipation, diarrhea and nausea.   Genitourinary: Negative.  Negative for difficulty urinating, dysuria, flank  pain and frequency.       Current Outpatient Medications on File Prior to Visit   Medication Sig   • albuterol (PROVENTIL HFA,VENTOLIN HFA) 90 mcg/act inhaler Inhale 2 puffs every 4 (four) hours as needed for wheezing   • apixaban (Eliquis) 5 mg Take 1 tablet (5 mg total) by mouth 2 (two) times a day   • atorvastatin (LIPITOR) 40 mg tablet Take 1 tablet (40 mg total) by mouth daily   • Blood Glucose Monitoring Suppl (Contour Next One) AILYN USE AS DIRECTED 3 TIMES A DAY (Patient taking differently: USE AS DIRECTED 3 TIMES A DAY    Once a day)   • Contour Next Test test strip USE TO TEST BLOOD SUGAR 3 TIMES A DAY (Patient taking differently: USE TO TEST BLOOD SUGAR 3 TIMES A DAY    Once a day)   • [employer] Lancets Thin 26G MISC USE 3 (THREE) TIMES A DAY (Patient taking differently: Once a day)   • DULoxetine (CYMBALTA) 60 mg delayed release capsule Take 60 mg by mouth daily   • Empagliflozin (JARDIANCE) 10 MG TABS tablet Take 1 tablet (10 mg total) by mouth daily   • glycopyrrolate-formoterol (BEVESPI AEROSPHERE) 9-4.8 MCG/ACT inhaler Inhale 2 puffs 2 (two) times a day   • Insulin Glargine Solostar (Lantus SoloStar) 100 UNIT/ML SOPN Inject 0.58 mL (58 Units total) as directed daily at bedtime Inject 55 units daily at bedtime   • insulin glulisine (Apidra SoloStar) 100 units/mL injection pen Inject 25 Units under the skin 3 (three) times a day with meals 25 units 3 times a day with meals   • Insulin Pen Needle (BD Pen Needle Love 2nd Gen) 32G X 4 MM MISC Use daily at bedtime Use 4 new needles daily .   • loratadine (CLARITIN) 10 mg tablet Take 1 tablet (10 mg total) by mouth daily as needed for allergies   • LORazepam (ATIVAN) 0.5 mg tablet TAKE 2 TABLETS BY MOUTH AT BEDTIME AND 1 EVERY 6 HOURS AS NEEDED FOR ANXIETY   • metoprolol succinate (TOPROL-XL) 100 mg 24 hr tablet Take 1 tablet (100 mg total) by mouth daily   • montelukast (SINGULAIR) 10 mg tablet TAKE 1 TABLET BY MOUTH EVERYDAY AT BEDTIME   • omeprazole (PriLOSEC)  "40 MG capsule Take 1 capsule (40 mg total) by mouth daily   • potassium chloride (Klor-Con M20) 20 mEq tablet Take 2 tablets (40 mEq total) by mouth daily   • traZODone (DESYREL) 150 mg tablet TAKE 1 TABLET BY MOUTH EVERYDAY AT BEDTIME   • albuterol (2.5 mg/3 mL) 0.083 % nebulizer solution Take 3 mL (2.5 mg total) by nebulization every 6 (six) hours as needed for wheezing or shortness of breath   • budesonide (PULMICORT) 0.5 mg/2 mL nebulizer solution TAKE 2 ML (0.5 MG TOTAL) BY NEBULIZATION TWICE A DAY RINSE MOUTH AFTER USE (Patient not taking: Reported on 2/26/2024)   • EPINEPHrine (EPIPEN) 0.3 mg/0.3 mL SOAJ Inject 0.3 mL (0.3 mg total) into a muscle once for 1 dose   • escitalopram (LEXAPRO) 20 mg tablet TAKE 1 TABLET BY MOUTH EVERY DAY (Patient not taking: Reported on 2/26/2024)   • ferrous sulfate 220 (44 Fe) mg/5 mL solution TAKE 5 ML (220 MG TOTAL) BY MOUTH 2 (TWO) TIMES A DAY BEFORE MEALS (Patient not taking: Reported on 2/26/2024)   • fluticasone (FLONASE) 50 mcg/act nasal spray 1 spray into each nostril 2 (two) times a day (Patient not taking: Reported on 2/26/2024)   • levalbuterol (XOPENEX) 1.25 mg/0.5 mL nebulizer solution Take 0.5 mL (1.25 mg total) by nebulization 2 (two) times a day (Patient not taking: Reported on 2/26/2024)   • naloxone (NARCAN) 4 mg/0.1 mL nasal spray Administer 1 spray into a nostril. If breathing does not return to normal or if breathing difficulty resumes after 2-3 minutes, give another dose in the other nostril using a new spray.   • nystatin (MYCOSTATIN) powder Apply topically 2 (two) times a day (Patient not taking: Reported on 2/26/2024)       Objective     BP 98/60   Pulse 104   Temp 98 °F (36.7 °C)   Ht 5' 3\" (1.6 m)   Wt 127 kg (279 lb)   SpO2 (!) 79% Comment: 4L02  BMI 49.42 kg/m²     Physical Exam  Vitals reviewed.   Constitutional:       General: She is not in acute distress.     Appearance: Normal appearance.   HENT:      Head: Normocephalic.      Right Ear: " External ear normal.      Left Ear: External ear normal.   Eyes:      Pupils: Pupils are equal, round, and reactive to light.   Cardiovascular:      Rate and Rhythm: Normal rate and regular rhythm.      Heart sounds: Normal heart sounds.   Pulmonary:      Effort: Pulmonary effort is normal.      Breath sounds: Normal breath sounds.   Neurological:      Mental Status: She is alert. Mental status is at baseline.   Psychiatric:         Attention and Perception: Attention normal.         Mood and Affect: Mood is depressed.         Speech: Speech normal.         Behavior: Behavior normal.       Laith Olivares MD

## 2024-02-28 NOTE — ASSESSMENT & PLAN NOTE
Wt Readings from Last 3 Encounters:   02/28/24 119 kg (262 lb)   02/26/24 127 kg (279 lb)   02/26/24 127 kg (279 lb)     Seen in the cardiology office 2/26  Approximately 15 pounds weight gain in the past 2 weeks.  Noncompliant with salt diet  On Bumex 6 mg twice daily recently increased   patient obese  Chest x-ray with congestion  Last echo August 2023 showed LVEF 65% diastolic dysfunction 2, R VSP 61 mmHg  Started on Bumex drip, had 7 L urine output/24 hours / -6.1/24 hours    Plan:   Continue Bumex drip  Strict intake output  Daily weights  Low-salt diet  Cardiology consulted  Continue spironolactone, metoprolol and Jardiance

## 2024-02-29 PROBLEM — K59.00 CONSTIPATION: Status: RESOLVED | Noted: 2024-02-28 | Resolved: 2024-02-29

## 2024-02-29 LAB
ANION GAP SERPL CALCULATED.3IONS-SCNC: 6 MMOL/L
BUN SERPL-MCNC: 18 MG/DL (ref 5–25)
CALCIUM SERPL-MCNC: 8.6 MG/DL (ref 8.4–10.2)
CHLORIDE SERPL-SCNC: 93 MMOL/L (ref 96–108)
CO2 SERPL-SCNC: 39 MMOL/L (ref 21–32)
CREAT SERPL-MCNC: 0.74 MG/DL (ref 0.6–1.3)
ERYTHROCYTE [DISTWIDTH] IN BLOOD BY AUTOMATED COUNT: 19.1 % (ref 11.6–15.1)
GFR SERPL CREATININE-BSD FRML MDRD: 85 ML/MIN/1.73SQ M
GLUCOSE SERPL-MCNC: 120 MG/DL (ref 65–140)
GLUCOSE SERPL-MCNC: 123 MG/DL (ref 65–140)
GLUCOSE SERPL-MCNC: 149 MG/DL (ref 65–140)
GLUCOSE SERPL-MCNC: 151 MG/DL (ref 65–140)
GLUCOSE SERPL-MCNC: 195 MG/DL (ref 65–140)
HCT VFR BLD AUTO: 37.4 % (ref 34.8–46.1)
HGB BLD-MCNC: 10.5 G/DL (ref 11.5–15.4)
MCH RBC QN AUTO: 19.2 PG (ref 26.8–34.3)
MCHC RBC AUTO-ENTMCNC: 28.1 G/DL (ref 31.4–37.4)
MCV RBC AUTO: 68 FL (ref 82–98)
PLATELET # BLD AUTO: 290 THOUSANDS/UL (ref 149–390)
PMV BLD AUTO: 10.5 FL (ref 8.9–12.7)
POTASSIUM SERPL-SCNC: 3.5 MMOL/L (ref 3.5–5.3)
RBC # BLD AUTO: 5.47 MILLION/UL (ref 3.81–5.12)
SODIUM SERPL-SCNC: 138 MMOL/L (ref 135–147)
WBC # BLD AUTO: 10.67 THOUSAND/UL (ref 4.31–10.16)

## 2024-02-29 PROCEDURE — 85027 COMPLETE CBC AUTOMATED: CPT | Performed by: INTERNAL MEDICINE

## 2024-02-29 PROCEDURE — 94640 AIRWAY INHALATION TREATMENT: CPT

## 2024-02-29 PROCEDURE — 94760 N-INVAS EAR/PLS OXIMETRY 1: CPT

## 2024-02-29 PROCEDURE — 99232 SBSQ HOSP IP/OBS MODERATE 35: CPT | Performed by: INTERNAL MEDICINE

## 2024-02-29 PROCEDURE — 82948 REAGENT STRIP/BLOOD GLUCOSE: CPT

## 2024-02-29 PROCEDURE — 94660 CPAP INITIATION&MGMT: CPT

## 2024-02-29 PROCEDURE — 80048 BASIC METABOLIC PNL TOTAL CA: CPT | Performed by: INTERNAL MEDICINE

## 2024-02-29 RX ORDER — NYSTATIN 100000 [USP'U]/G
POWDER TOPICAL 2 TIMES DAILY
Status: DISCONTINUED | OUTPATIENT
Start: 2024-02-29 | End: 2024-03-06 | Stop reason: HOSPADM

## 2024-02-29 RX ORDER — LIDOCAINE 50 MG/G
1 PATCH TOPICAL DAILY
Status: DISCONTINUED | OUTPATIENT
Start: 2024-02-29 | End: 2024-03-06 | Stop reason: HOSPADM

## 2024-02-29 RX ADMIN — METOPROLOL SUCCINATE 100 MG: 50 TABLET, EXTENDED RELEASE ORAL at 09:09

## 2024-02-29 RX ADMIN — DULOXETINE HYDROCHLORIDE 60 MG: 30 CAPSULE, DELAYED RELEASE ORAL at 09:09

## 2024-02-29 RX ADMIN — PANTOPRAZOLE SODIUM 40 MG: 40 TABLET, DELAYED RELEASE ORAL at 05:19

## 2024-02-29 RX ADMIN — APIXABAN 5 MG: 5 TABLET, FILM COATED ORAL at 17:47

## 2024-02-29 RX ADMIN — SPIRONOLACTONE 50 MG: 25 TABLET ORAL at 09:09

## 2024-02-29 RX ADMIN — FLUTICASONE PROPIONATE 1 SPRAY: 50 SPRAY, METERED NASAL at 09:12

## 2024-02-29 RX ADMIN — MONTELUKAST 10 MG: 10 TABLET, FILM COATED ORAL at 21:52

## 2024-02-29 RX ADMIN — FLUTICASONE PROPIONATE 1 SPRAY: 50 SPRAY, METERED NASAL at 21:51

## 2024-02-29 RX ADMIN — INSULIN LISPRO 25 UNITS: 100 INJECTION, SOLUTION INTRAVENOUS; SUBCUTANEOUS at 09:06

## 2024-02-29 RX ADMIN — SENNOSIDES AND DOCUSATE SODIUM 1 TABLET: 8.6; 5 TABLET ORAL at 17:47

## 2024-02-29 RX ADMIN — ATORVASTATIN CALCIUM 40 MG: 40 TABLET, FILM COATED ORAL at 09:10

## 2024-02-29 RX ADMIN — APIXABAN 5 MG: 5 TABLET, FILM COATED ORAL at 09:10

## 2024-02-29 RX ADMIN — TRAZODONE HYDROCHLORIDE 150 MG: 150 TABLET ORAL at 21:52

## 2024-02-29 RX ADMIN — EMPAGLIFLOZIN 10 MG: 10 TABLET, FILM COATED ORAL at 09:16

## 2024-02-29 RX ADMIN — INSULIN GLARGINE 55 UNITS: 100 INJECTION, SOLUTION SUBCUTANEOUS at 21:51

## 2024-02-29 RX ADMIN — POTASSIUM CHLORIDE 40 MEQ: 1500 TABLET, EXTENDED RELEASE ORAL at 09:08

## 2024-02-29 RX ADMIN — SENNOSIDES AND DOCUSATE SODIUM 1 TABLET: 8.6; 5 TABLET ORAL at 09:10

## 2024-02-29 RX ADMIN — Medication 0.5 MG/HR: at 22:00

## 2024-02-29 RX ADMIN — INSULIN LISPRO 25 UNITS: 100 INJECTION, SOLUTION INTRAVENOUS; SUBCUTANEOUS at 13:01

## 2024-02-29 RX ADMIN — INSULIN LISPRO 2 UNITS: 100 INJECTION, SOLUTION INTRAVENOUS; SUBCUTANEOUS at 17:50

## 2024-02-29 RX ADMIN — BUDESONIDE 0.5 MG: 0.5 INHALANT ORAL at 21:10

## 2024-02-29 RX ADMIN — LIDOCAINE 5% 1 PATCH: 700 PATCH TOPICAL at 10:47

## 2024-02-29 RX ADMIN — INSULIN LISPRO 25 UNITS: 100 INJECTION, SOLUTION INTRAVENOUS; SUBCUTANEOUS at 17:49

## 2024-02-29 RX ADMIN — ACETAMINOPHEN 650 MG: 325 TABLET, FILM COATED ORAL at 08:16

## 2024-02-29 RX ADMIN — BUDESONIDE 0.5 MG: 0.5 INHALANT ORAL at 07:44

## 2024-02-29 RX ADMIN — NYSTATIN: 100000 POWDER TOPICAL at 14:14

## 2024-02-29 RX ADMIN — INSULIN LISPRO 2 UNITS: 100 INJECTION, SOLUTION INTRAVENOUS; SUBCUTANEOUS at 12:33

## 2024-02-29 RX ADMIN — ACETAMINOPHEN 650 MG: 325 TABLET, FILM COATED ORAL at 21:54

## 2024-02-29 NOTE — PROGRESS NOTES
"Cardiology Progress Note   Mattie Joy 63 y.o. female MRN: [id number]    Unit/Bed#: -Joe Encounter: [id number]      ASSESSMENT:  Acute on chronic diastolic heart failure  8/15/2023 echo: LVEF 65%, grade 2 diastolic dysfunction, severely elevated RV systolic pressure 61 mmHg  Current diuretic: Bumex 5 BID  Wt as low as 265 in the hospital February 2024  Persistent atrial fibrillation  Thromboembolic PPx: Eliquis 5 twice daily  Rate control: Toprol- daily  COPD with acute exacerbation  Type 2 diabetes  Obesity, BMI 51  Pulmonary hypertension likely group 2/3 from left heart disease, underlying lung disease, OHV     Discussion:   BMP stable overall; replenish for goal K>4 and Mg>2  Net -4.2L overnight. -12.1L since admit. Standing weight today 269lbs  RVR noted overnight; improved with extra Toprol XL 50mg x1 yesterday  BP has prevented up titration of her GDMT.  We continue to  her on adherence to a salt/fluid restricted intake.       Plan:   Continue bumex gtt   Daily BMP and standing weights  Strict I&O charting  Telemetry monitoring   Continue Toprol- mg daily, spironolactone 50 mg daily, Jardiance 10 mg daily.     Subjective:   Patient seen and examined. Overnight events reviewed.     Objective:     Vitals: Blood pressure 103/59, pulse 101, temperature 98.1 °F (36.7 °C), temperature source Oral, resp. rate 20, height 5' 3\" (1.6 m), weight 122 kg (269 lb 11.2 oz), SpO2 95%, not currently breastfeeding., Body mass index is 47.78 kg/m².,   Orthostatic Blood Pressures      Flowsheet Row Most Recent Value   Blood Pressure 103/59 filed at 02/29/2024 0753   Patient Position - Orthostatic VS Sitting filed at 02/27/2024 1605              Intake/Output Summary (Last 24 hours) at 2/29/2024 1022  Last data filed at 2/29/2024 1001  Gross per 24 hour   Intake 1140 ml   Output 4200 ml   Net -3060 ml         Physical Exam:    GEN: Mattie Joy appears well, alert and oriented x 3, pleasant and cooperative "   HEENT: Sclera anicteric, conjunctivae pink, mucous membranes moist. Oropharynx clear.   NECK: supple, no significant JVD, Trachea midline, no thyromegaly.   HEART: regular rhythm, normal S1 and S2, no murmurs, clicks, gallops or rubs   LUNGS: clear to auscultation bilaterally; no wheezes, rales, or rhonchi. No increased work of breathing or signs of respiratory distress.   ABDOMEN: Soft, nontender, nondistended  EXTREMITIES: Skin warm and well perfused, no clubbing, cyanosis, or edema.  NEURO: No focal findings. Normal speech. Mood and affect normal.   SKIN: Normal without suspicious lesions on exposed skin.      Medications:      Current Facility-Administered Medications:     acetaminophen (TYLENOL) tablet 650 mg, 650 mg, Oral, Q6H PRN, Rosamaria Miller PA-C, 650 mg at 02/29/24 0816    albuterol (PROVENTIL HFA,VENTOLIN HFA) inhaler 2 puff, 2 puff, Inhalation, Q4H PRN, Magdalena Fleming MD, 2 puff at 02/26/24 1822    albuterol inhalation solution 2.5 mg, 2.5 mg, Nebulization, Q6H PRN, Magdalena Fleming MD    apixaban (ELIQUIS) tablet 5 mg, 5 mg, Oral, BID, Magdalena Fleming MD, 5 mg at 02/29/24 0910    atorvastatin (LIPITOR) tablet 40 mg, 40 mg, Oral, Daily, Magdalena Fleming MD, 40 mg at 02/29/24 0910    budesonide (PULMICORT) inhalation solution 0.5 mg, 0.5 mg, Nebulization, Q12H, Magdalena Fleming MD, 0.5 mg at 02/29/24 0744    bumetanide (BUMEX) 12.5 mg infusion 50 mL, 0.5 mg/hr, Intravenous, Continuous, Magdalena Fleming MD, Last Rate: 2 mL/hr at 02/28/24 2053, 0.5 mg/hr at 02/28/24 2053    DULoxetine (CYMBALTA) delayed release capsule 60 mg, 60 mg, Oral, Daily, Magdalena Fleming MD, 60 mg at 02/29/24 0909    Empagliflozin (JARDIANCE) tablet 10 mg, 10 mg, Oral, Daily, Magdalena Fleming MD, 10 mg at 02/29/24 0916    fluticasone (FLONASE) 50 mcg/act nasal spray 1 spray, 1 spray, Nasal, BID, Magdalena Fleming MD, 1 spray at 02/29/24 0912    insulin glargine (LANTUS) subcutaneous injection  55 Units 0.55 mL, 55 Units, Subcutaneous, HS, Magdalena Fleming MD, 55 Units at 02/28/24 2053    insulin lispro (HumaLOG) 100 units/mL subcutaneous injection 1-5 Units, 1-5 Units, Subcutaneous, HS, Magdalena Fleming MD, 2 Units at 02/28/24 2057    insulin lispro (HumaLOG) 100 units/mL subcutaneous injection 2-12 Units, 2-12 Units, Subcutaneous, TID AC, 2 Units at 02/28/24 1248 **AND** Fingerstick Glucose (POCT), , , TID AC, Magdalena Fleming MD    insulin lispro (HumaLOG) 100 units/mL subcutaneous injection 25 Units, 25 Units, Subcutaneous, TID With Meals, Magdalena Fleming MD, 25 Units at 02/29/24 0906    lidocaine (LIDODERM) 5 % patch 1 patch, 1 patch, Topical, Daily, Magdalena Fleming MD    loratadine (CLARITIN) tablet 10 mg, 10 mg, Oral, Daily PRN, Magdalena Fleming MD    LORazepam (ATIVAN) tablet 0.5 mg, 0.5 mg, Oral, Daily PRN, Magdalena Fleming MD    metoprolol succinate (TOPROL-XL) 24 hr tablet 100 mg, 100 mg, Oral, Daily, Fran John PA-C, 100 mg at 02/29/24 0909    montelukast (SINGULAIR) tablet 10 mg, 10 mg, Oral, HS, Magdalena Fleming MD, 10 mg at 02/28/24 2052    pantoprazole (PROTONIX) EC tablet 40 mg, 40 mg, Oral, Early Morning, Magdalena Fleming MD, 40 mg at 02/29/24 0519    polyethylene glycol (MIRALAX) packet 17 g, 17 g, Oral, Daily, Magdalena Fleming MD, 17 g at 02/28/24 1022    potassium chloride (Klor-Con M20) CR tablet 40 mEq, 40 mEq, Oral, Daily, Magdalena Fleming MD, 40 mEq at 02/29/24 0908    senna-docusate sodium (SENOKOT S) 8.6-50 mg per tablet 1 tablet, 1 tablet, Oral, BID, Magdalena Fleming MD, 1 tablet at 02/29/24 0910    spironolactone (ALDACTONE) tablet 50 mg, 50 mg, Oral, Daily, Magdalena Fleming MD, 50 mg at 02/29/24 0909    traZODone (DESYREL) tablet 150 mg, 150 mg, Oral, HS, Magdalena Fleming MD, 150 mg at 02/28/24 2052     Labs & Results:        Results from last 7 days   Lab Units 02/29/24  0450 02/28/24  0229 02/27/24  0441   WBC  Thousand/uL 10.67* 10.23* 11.04*   HEMOGLOBIN g/dL 10.5* 10.7* 9.3*   HEMATOCRIT % 37.4 38.5 33.3*   PLATELETS Thousands/uL 290 294 263         Results from last 7 days   Lab Units 02/29/24  0451 02/28/24  0229 02/27/24  0441 02/26/24  1423 02/26/24  1408   POTASSIUM mmol/L 3.5 3.5 3.3*  --  3.6   CHLORIDE mmol/L 93* 93* 94*  --  94*   CO2 mmol/L 39* 33* 36*  --  37*   CO2, I-STAT mmol/L  --   --   --  33*  --    BUN mg/dL 18 16 16  --  16   CREATININE mg/dL 0.74 0.73 0.67  --  0.72   CALCIUM mg/dL 8.6 8.5 8.3*  --  9.3   ALK PHOS U/L  --   --   --   --  147*   ALT U/L  --   --   --   --  26   AST U/L  --   --   --   --  20   GLUCOSE, ISTAT mg/dl  --   --   --  87  --      Results from last 7 days   Lab Units 02/26/24  1408   INR  1.33*   PTT seconds 32

## 2024-02-29 NOTE — ASSESSMENT & PLAN NOTE
Wt Readings from Last 3 Encounters:   02/29/24 122 kg (269 lb 11.2 oz)   02/26/24 127 kg (279 lb)   02/26/24 127 kg (279 lb)     Seen in the cardiology office 2/26  Approximately 15 pounds weight gain in the past 2 weeks.  Noncompliant with salt diet  On Bumex 6 mg twice daily recently increased   patient obese  Chest x-ray with congestion  Last echo August 2023 showed LVEF 65% diastolic dysfunction 2, R VSP 61 mmHg  Started on Bumex drip, had 5.3 L urine output/24 hours , negative 12 L since admission     Plan:   Continue Bumex drip  Strict intake output  Daily weights  Low-salt diet  Continue spironolactone, metoprolol and Jardiance

## 2024-02-29 NOTE — PLAN OF CARE
Problem: Potential for Falls  Goal: Patient will remain free of falls  Description: INTERVENTIONS:  - Educate patient/family on patient safety including physical limitations  - Instruct patient to call for assistance with activity   - Consult OT/PT to assist with strengthening/mobility   - Keep Call bell within reach  - Keep bed low and locked with side rails adjusted as appropriate  - Keep care items and personal belongings within reach  - Initiate and maintain comfort rounds  - Make Fall Risk Sign visible to staff  - Obtain necessary fall risk management equipment: non-slip socks  - Apply yellow socks and bracelet for high fall risk patients  - Consider moving patient to room near nurses station  Outcome: Progressing     Problem: PAIN - ADULT  Goal: Verbalizes/displays adequate comfort level or baseline comfort level  Description: Interventions:  - Encourage patient to monitor pain and request assistance  - Assess pain using appropriate pain scale  - Administer analgesics based on type and severity of pain and evaluate response  - Implement non-pharmacological measures as appropriate and evaluate response  - Consider cultural and social influences on pain and pain management  - Notify physician/advanced practitioner if interventions unsuccessful or patient reports new pain  Outcome: Progressing     Problem: INFECTION - ADULT  Goal: Absence or prevention of progression during hospitalization  Description: INTERVENTIONS:  - Assess and monitor for signs and symptoms of infection  - Monitor lab/diagnostic results  - Monitor all insertion sites, i.e. indwelling lines, tubes, and drains  - Monitor endotracheal if appropriate and nasal secretions for changes in amount and color  - [place] appropriate cooling/warming therapies per order  - Administer medications as ordered  - Instruct and encourage patient and family to use good hand hygiene technique  - Identify and instruct in appropriate isolation precautions for  identified infection/condition  Outcome: Progressing     Problem: SAFETY ADULT  Goal: Patient will remain free of falls  Description: INTERVENTIONS:  - Educate patient/family on patient safety including physical limitations  - Instruct patient to call for assistance with activity   - Consult OT/PT to assist with strengthening/mobility   - Keep Call bell within reach  - Keep bed low and locked with side rails adjusted as appropriate  - Keep care items and personal belongings within reach  - Initiate and maintain comfort rounds  - Make Fall Risk Sign visible to staff  - Obtain necessary fall risk management equipment: non-slip socks  - Apply yellow socks and bracelet for high fall risk patients  - Consider moving patient to room near nurses station  Outcome: Progressing  Goal: Maintain or return to baseline ADL function  Description: INTERVENTIONS:  - Educate patient/family on patient safety including physical limitations  - Instruct patient to call for assistance with activity   - Consult OT/PT to assist with strengthening/mobility   - Keep Call bell within reach  - Keep bed low and locked with side rails adjusted as appropriate  - Keep care items and personal belongings within reach  - Initiate and maintain comfort rounds  - Make Fall Risk Sign visible to staff  - Obtain necessary fall risk management equipment: non-slip socks  - Apply yellow socks and bracelet for high fall risk patients  - Consider moving patient to room near nurses station  Outcome: Progressing  Goal: Maintains/Returns to pre admission functional level  Description: INTERVENTIONS:  - Perform AM-PAC 6 Click Basic Mobility/ Daily Activity assessment daily.  - Set and communicate daily mobility goal to care team and patient/family/caregiver.   - Collaborate with rehabilitation services on mobility goals if consulted  - Perform Range of Motion 2 times a day.  - Reposition patient every 2 hours.  - Dangle patient 2 times a day  - Stand patient 2 times  a day  - Ambulate patient 2 times a day  - Out of bed to chair 2 times a day   - Out of bed for meals 2 times a day  - Out of bed for toileting  - Record patient progress and toleration of activity level   Outcome: Progressing     Problem: DISCHARGE PLANNING  Goal: Discharge to home or other facility with appropriate resources  Description: INTERVENTIONS:  - Identify barriers to discharge w/patient and caregiver  - Arrange for needed discharge resources and transportation as appropriate  - Identify discharge learning needs (meds, wound care, etc.)  - Arrange for interpretive services to assist at discharge as needed  - Refer to Case Management Department for coordinating discharge planning if the patient needs post-hospital services based on physician/advanced practitioner order or complex needs related to functional status, cognitive ability, or social support system  Outcome: Progressing     Problem: Knowledge Deficit  Goal: Patient/family/caregiver demonstrates understanding of disease process, treatment plan, medications, and discharge instructions  Description: Complete learning assessment and assess knowledge base.  Interventions:  - Provide teaching at level of understanding  - Provide teaching via preferred learning methods  Outcome: Progressing     Problem: CARDIOVASCULAR - ADULT  Goal: Maintains optimal cardiac output and hemodynamic stability  Description: INTERVENTIONS:  - Monitor I/O, vital signs and rhythm  - Monitor for S/S and trends of decreased cardiac output  - Administer and titrate ordered vasoactive medications to optimize hemodynamic stability  - Assess quality of pulses, skin color and temperature  - Assess for signs of decreased coronary artery perfusion  - Instruct patient to report change in severity of symptoms  Outcome: Progressing  Goal: Absence of cardiac dysrhythmias or at baseline rhythm  Description: INTERVENTIONS:  - Continuous cardiac monitoring, vital signs, obtain 12 lead EKG if  ordered  - Administer antiarrhythmic and heart rate control medications as ordered  - Monitor electrolytes and administer replacement therapy as ordered  Outcome: Progressing     Problem: RESPIRATORY - ADULT  Goal: Achieves optimal ventilation and oxygenation  Description: INTERVENTIONS:  - Assess for changes in respiratory status  - Assess for changes in mentation and behavior  - Position to facilitate oxygenation and minimize respiratory effort  - Oxygen administered by appropriate delivery if ordered  - Initiate smoking cessation education as indicated  - Encourage broncho-pulmonary hygiene including cough, deep breathe, Incentive Spirometry  - Assess the need for suctioning and aspirate as needed  - Assess and instruct to report SOB or any respiratory difficulty  - Respiratory Therapy support as indicated  Outcome: Progressing     Problem: METABOLIC, FLUID AND ELECTROLYTES - ADULT  Goal: Electrolytes maintained within normal limits  Description: INTERVENTIONS:  - Monitor labs and assess patient for signs and symptoms of electrolyte imbalances  - Administer electrolyte replacement as ordered  - Monitor response to electrolyte replacements, including repeat lab results as appropriate  - Instruct patient on fluid and nutrition as appropriate  Outcome: Progressing  Goal: Fluid balance maintained  Description: INTERVENTIONS:  - Monitor labs   - Monitor I/O and WT  - Instruct patient on fluid and nutrition as appropriate  - Assess for signs & symptoms of volume excess or deficit  Outcome: Progressing     Problem: Prexisting or High Potential for Compromised Skin Integrity  Goal: Skin integrity is maintained or improved  Description: INTERVENTIONS:  - Identify patients at risk for skin breakdown  - Assess and monitor skin integrity  - Assess and monitor nutrition and hydration status  - Monitor labs   - Assess for incontinence   - Turn and reposition patient  - Assist with mobility/ambulation  - Relieve pressure over  bony prominences  - Avoid friction and shearing  - Provide appropriate hygiene as needed including keeping skin clean and dry  - Evaluate need for skin moisturizer/barrier cream  - Collaborate with interdisciplinary team   - Patient/family teaching  - Consider wound care consult   Outcome: Progressing

## 2024-02-29 NOTE — PROGRESS NOTES
[facility]  Progress Note  Name: Mattie Joy I  MRN: [id number]  Unit/Bed#: -01 I Date of Admission: 2/26/2024   Date of Service: 2/29/2024 I Hospital Day: 3    Assessment/Plan   * Acute on chronic respiratory failure (HCC)  Assessment & Plan  At baseline on 4 L nasal cannula oxygen secondary to severe COPD, pulmonary hypertension  Required up to 7 L nasal cannula oxygen on presentation  Likely decompensated secondary to CHF  Flu/COVID/RSV negative  Monitor with diuresis    Currently saturating fine at 4 L nasal cannula oxygen which is her baseline    Acute on chronic diastolic CHF (congestive heart failure) ([facility])  Assessment & Plan  Wt Readings from Last 3 Encounters:   02/29/24 122 kg (269 lb 11.2 oz)   02/26/24 127 kg (279 lb)   02/26/24 127 kg (279 lb)     Seen in the cardiology office 2/26  Approximately 15 pounds weight gain in the past 2 weeks.  Noncompliant with salt diet  On Bumex 6 mg twice daily recently increased   patient obese  Chest x-ray with congestion  Last echo August 2023 showed LVEF 65% diastolic dysfunction 2, R VSP 61 mmHg  Started on Bumex drip, had 5.3 L urine output/24 hours , negative 12 L since admission     Plan:   Continue Bumex drip  Strict intake output  Daily weights  Low-salt diet  Continue spironolactone, metoprolol and Jardiance                COPD, severe ([facility])  Assessment & Plan  Followed with pulmonology.  On albuterol nebulizers as needed, Pulmicort, Claritin, Singulair, bevespi   Noncompliant with medication.  Continue Pulmicort/albuterol, Claritin, Singulair    Not in COPD exacerbation currently    Morbid obesity ([facility])  Assessment & Plan  Lifestyle changes encouraged     Current moderate episode of major depressive disorder without prior episode ([facility])  Assessment & Plan  On Cymbalta, trazodone and Lexapro.  Reported Lexapro was discontinued  Continue Cymbalta and trazodone    Paroxysmal atrial fibrillation ([facility])  Assessment &  Plan  On metoprolol 100 mg daily and Eliquis 5 twice daily  Discussed with cardiology, will continue 100 mg metoprolol for now and monitor if we will need to increase the dose.  Continue    Type 2 diabetes mellitus with stage 2 chronic kidney disease, with long-term current use of insulin ([facility])  Assessment & Plan  Lab Results   Component Value Date    HGBA1C 8.5 (H) 01/22/2024       Recent Labs     02/28/24  1650 02/28/24  2041 02/29/24  0714 02/29/24  1111   POCGLU 138 225* 149* 195*         Blood Sugar Average: Last 72 hrs:  (P) [id number]  On Lantus 55 units at bedtime and Apidra 25 units 3 times daily with meals.  Continue Lantus 85 units at bedtime, Humalog 25 units 3 times daily with meals and sliding scale  Diabetic diet  hypoGlycemia protocol    Constipation-resolved as of 2/29/2024  Assessment & Plan  Started bowel regimen             VTE Pharmacologic Prophylaxis: VTE Score: 6 High Risk (Score >/= 5) - Pharmacological DVT Prophylaxis Ordered: apixaban (Eliquis). Sequential Compression Devices Ordered.    Mobility:   Basic Mobility Inpatient Raw Score: 20  JH-HLM Goal: 6: Walk 10 steps or more  JH-HLM Achieved: 7: Walk 25 feet or more  HLM Goal achieved. Continue to encourage appropriate mobility.    Patient Centered Rounds: I performed bedside rounds with nursing staff today.   Discussions with Specialists or Other Care Team Provider: cm, cardiology    Education and Discussions with Family / Patient: Patient declined call to .     Total Time Spent on Date of Encounter in care of patient: 35 mins. This time was spent on one or more of the following: performing physical exam; counseling and coordination of care; obtaining or reviewing history; documenting in the medical record; reviewing/ordering tests, medications or procedures; communicating with other healthcare professionals and discussing with patient's family/caregivers.    Current Length of Stay: 3 day(s)  Current Patient  Status: Inpatient   Certification Statement: The patient will continue to require additional inpatient hospital stay due to chf  Discharge Plan: Anticipate discharge in 48-72 hrs to home.    Code Status: Level 1 - Full Code    Subjective:     Lower back pain. Had BM. No sob    Objective:     Vitals:   Temp (24hrs), Av.4 °F (36.9 °C), Min:98.1 °F (36.7 °C), Max:98.7 °F (37.1 °C)    Temp:  [98.1 °F (36.7 °C)-98.7 °F (37.1 °C)] 98.1 °F (36.7 °C)  HR:  [] 101  Resp:  [20] 20  BP: ()/(51-77) 103/59  SpO2:  [91 %-99 %] 95 %  Body mass index is 47.78 kg/m².     Input and Output Summary (last 24 hours):     Intake/Output Summary (Last 24 hours) at 2024 1612  Last data filed at 2024 1401  Gross per 24 hour   Intake 1080 ml   Output 5150 ml   Net -4070 ml       Physical Exam:   Physical Exam  Vitals and nursing note reviewed.   Constitutional:       General: She is not in acute distress.     Appearance: She is obese. She is not diaphoretic.   HENT:      Head: Normocephalic.   Eyes:      General: No scleral icterus.        Right eye: No discharge.         Left eye: No discharge.   Cardiovascular:      Rate and Rhythm: Normal rate. Rhythm irregular.   Pulmonary:      Effort: Pulmonary effort is normal. No respiratory distress.      Breath sounds: Normal breath sounds. No wheezing, rhonchi or rales.   Abdominal:      General: There is no distension.      Palpations: Abdomen is soft.      Tenderness: There is no abdominal tenderness. There is no guarding or rebound.   Musculoskeletal:      Cervical back: Normal range of motion.      Right lower leg: No edema.      Left lower leg: No edema.   Skin:     General: Skin is warm.   Neurological:      Mental Status: She is alert.   Psychiatric:         Mood and Affect: Mood normal.         Behavior: Behavior normal.         Thought Content: Thought content normal.         Judgment: Judgment normal.          Additional Data:     Labs:  Results from last 7 days    Lab Units 02/29/24  0450 02/28/24  0229 02/27/24  0441   WBC Thousand/uL 10.67*   < > 11.04*   HEMOGLOBIN g/dL 10.5*   < > 9.3*   HEMATOCRIT % 37.4   < > 33.3*   PLATELETS Thousands/uL 290   < > 263   NEUTROS PCT %  --   --  71   LYMPHS PCT %  --   --  19   MONOS PCT %  --   --  9   EOS PCT %  --   --  0    < > = values in this interval not displayed.     Results from last 7 days   Lab Units 02/29/24  0451 02/26/24  1423 02/26/24  1408   SODIUM mmol/L 138   < > 139   POTASSIUM mmol/L 3.5   < > 3.6   CHLORIDE mmol/L 93*   < > 94*   CO2 mmol/L 39*   < > 37*   CO2, I-STAT   --    < >  --    BUN mg/dL 18   < > 16   CREATININE mg/dL 0.74   < > 0.72   ANION GAP mmol/L 6   < > 8   CALCIUM mg/dL 8.6   < > 9.3   ALBUMIN g/dL  --   --  4.1   TOTAL BILIRUBIN mg/dL  --   --  0.39   ALK PHOS U/L  --   --  147*   ALT U/L  --   --  26   AST U/L  --   --  20   GLUCOSE RANDOM mg/dL 120   < > 83    < > = values in this interval not displayed.     Results from last 7 days   Lab Units 02/26/24  1408   INR  1.33*     Results from last 7 days   Lab Units 02/29/24  1111 02/29/24  0714 02/28/24  2041 02/28/24  1650 02/28/24  1106 02/28/24  0722 02/28/24  0234 02/27/24  2038 02/27/24  1603 02/27/24  1124 02/27/24  0802 02/26/24  2135   POC GLUCOSE mg/dl 195* 149* 225* 138 157* 197* 188* 127 153* 123 142* 145*         Results from last 7 days   Lab Units 02/26/24  1648 02/26/24  1408   LACTIC ACID mmol/L 0.8 2.3*   PROCALCITONIN ng/ml  --  0.06       Lines/Drains:  Invasive Devices       Peripheral Intravenous Line  Duration             Peripheral IV 02/26/24 Right Antecubital 3 days                      Telemetry:  Telemetry Orders (From admission, onward)               24 Hour Telemetry Monitoring  Continuous x 24 Hours ([place])        Question:  Reason for 24 Hour Telemetry  Answer:  Decompensated CHF- and any one of the following: continuous diuretic infusion or total diuretic dose >200 mg daily, associated electrolyte derangement (I.e.  K < 3.0), ionotropic drip (continuous infusion), hx of ventricular arrhythmia, or new EF < 35%                     Telemetry Reviewed: Atrial fibrillation. HR averaging 115  Indication for Continued Telemetry Use: Acute CHF on >200 mg lasix/day or equivalent dose or with new reduced EF.              Imaging: No pertinent imaging reviewed.    Recent Cultures (last 7 days):   Results from last 7 days   Lab Units 02/26/24  2120 02/26/24  1419 02/26/24  1408   BLOOD CULTURE   --  No Growth at 48 hrs. No Growth at 48 hrs.   URINE CULTURE  40,000-49,000 cfu/ml  --   --        Last 24 Hours Medication List:   Current Facility-Administered Medications   Medication Dose Route Frequency Provider Last Rate    acetaminophen  650 mg Oral Q6H PRN Rosamaria Miller PA-C      albuterol  2 puff Inhalation Q4H PRN Magdalena Fleming MD      albuterol  2.5 mg Nebulization Q6H PRN Magdalena Fleming MD      apixaban  5 mg Oral BID Magdalena Fleming MD      atorvastatin  40 mg Oral Daily Magdalena Fleming MD      budesonide  0.5 mg Nebulization Q12H Magdalena Fleming MD      bumetanide (BUMEX) 12.5 mg infusion 50 mL  0.5 mg/hr Intravenous Continuous Magdalena Fleming MD 0.5 mg/hr (02/28/24 2053)    DULoxetine  60 mg Oral Daily Magdalena Fleming MD      Empagliflozin  10 mg Oral Daily Magdalena Fleming MD      fluticasone  1 spray Nasal BID Magdalena Fleming MD      insulin glargine  55 Units Subcutaneous HS Magdalena Fleming MD      insulin lispro  1-5 Units Subcutaneous HS Magdalena Fleming MD      insulin lispro  2-12 Units Subcutaneous TID AC Magdalena Fleming MD      insulin lispro  25 Units Subcutaneous TID With Meals Magdalena Fleming MD      lidocaine  1 patch Topical Daily Magdalena Fleming MD      loratadine  10 mg Oral Daily PRN Magdalena Fleming MD      LORazepam  0.5 mg Oral Daily PRN Magdalena Fleming MD      metoprolol succinate  100 mg Oral Daily Fran John PA-C       montelukast  10 mg Oral HS Magdalena Fleming MD      nystatin   Topical BID Magdalena Fleming MD      pantoprazole  40 mg Oral Early Morning Magdalena Fleming MD      polyethylene glycol  17 g Oral Daily Magdalena Fleming MD      potassium chloride  40 mEq Oral Daily Magdalena Fleming MD      senna-docusate sodium  1 tablet Oral BID Magdalena Fleming MD      spironolactone  50 mg Oral Daily Magdalena Fleming MD      traZODone  150 mg Oral HS Magdalena Fleming MD          Today, Patient Was Seen By: Magdalena Fleming MD    **Please Note: This note may have been constructed using a voice recognition system.**

## 2024-02-29 NOTE — ASSESSMENT & PLAN NOTE
Lab Results   Component Value Date    HGBA1C 8.5 (H) 01/22/2024       Recent Labs     02/28/24  1650 02/28/24  2041 02/29/24  0714 02/29/24  1111   POCGLU 138 225* 149* 195*         Blood Sugar Average: Last 72 hrs:  (P) [id number]  On Lantus 55 units at bedtime and Apidra 25 units 3 times daily with meals.  Continue Lantus 85 units at bedtime, Humalog 25 units 3 times daily with meals and sliding scale  Diabetic diet  hypoGlycemia protocol

## 2024-02-29 NOTE — PLAN OF CARE
Problem: Potential for Falls  Goal: Patient will remain free of falls  Description: INTERVENTIONS:  - Educate patient/family on patient safety including physical limitations  - Instruct patient to call for assistance with activity   - Consult OT/PT to assist with strengthening/mobility   - Keep Call bell within reach  - Keep bed low and locked with side rails adjusted as appropriate  - Keep care items and personal belongings within reach  - Initiate and maintain comfort rounds  - Make Fall Risk Sign visible to staff  - Obtain necessary fall risk management equipment: non-slip socks  - Apply yellow socks and bracelet for high fall risk patients  - Consider moving patient to room near nurses station  Outcome: Progressing     Problem: PAIN - ADULT  Goal: Verbalizes/displays adequate comfort level or baseline comfort level  Description: Interventions:  - Encourage patient to monitor pain and request assistance  - Assess pain using appropriate pain scale  - Administer analgesics based on type and severity of pain and evaluate response  - Implement non-pharmacological measures as appropriate and evaluate response  - Consider cultural and social influences on pain and pain management  - Notify physician/advanced practitioner if interventions unsuccessful or patient reports new pain  Outcome: Progressing     Problem: INFECTION - ADULT  Goal: Absence or prevention of progression during hospitalization  Description: INTERVENTIONS:  - Assess and monitor for signs and symptoms of infection  - Monitor lab/diagnostic results  - Monitor all insertion sites, i.e. indwelling lines, tubes, and drains  - Monitor endotracheal if appropriate and nasal secretions for changes in amount and color  - Deary appropriate cooling/warming therapies per order  - Administer medications as ordered  - Instruct and encourage patient and family to use good hand hygiene technique  - Identify and instruct in appropriate isolation precautions for  identified infection/condition  Outcome: Progressing     Problem: SAFETY ADULT  Goal: Patient will remain free of falls  Description: INTERVENTIONS:  - Educate patient/family on patient safety including physical limitations  - Instruct patient to call for assistance with activity   - Consult OT/PT to assist with strengthening/mobility   - Keep Call bell within reach  - Keep bed low and locked with side rails adjusted as appropriate  - Keep care items and personal belongings within reach  - Initiate and maintain comfort rounds  - Make Fall Risk Sign visible to staff  - Obtain necessary fall risk management equipment: non-slip socks  - Apply yellow socks and bracelet for high fall risk patients  - Consider moving patient to room near nurses station  Outcome: Progressing  Goal: Maintain or return to baseline ADL function  Description: INTERVENTIONS:  - Educate patient/family on patient safety including physical limitations  - Instruct patient to call for assistance with activity   - Consult OT/PT to assist with strengthening/mobility   - Keep Call bell within reach  - Keep bed low and locked with side rails adjusted as appropriate  - Keep care items and personal belongings within reach  - Initiate and maintain comfort rounds  - Make Fall Risk Sign visible to staff  - Obtain necessary fall risk management equipment: non-slip socks  - Apply yellow socks and bracelet for high fall risk patients  - Consider moving patient to room near nurses station  Outcome: Progressing  Goal: Maintains/Returns to pre admission functional level  Description: INTERVENTIONS:  - Perform AM-PAC 6 Click Basic Mobility/ Daily Activity assessment daily.  - Set and communicate daily mobility goal to care team and patient/family/caregiver.   - Collaborate with rehabilitation services on mobility goals if consulted  - Perform Range of Motion 2 times a day.  - Reposition patient every 2 hours.  - Dangle patient 2 times a day  - Stand patient 2 times  a day  - Ambulate patient 2 times a day  - Out of bed to chair 2 times a day   - Out of bed for meals 2 times a day  - Out of bed for toileting  - Record patient progress and toleration of activity level   Outcome: Progressing     Problem: DISCHARGE PLANNING  Goal: Discharge to home or other facility with appropriate resources  Description: INTERVENTIONS:  - Identify barriers to discharge w/patient and caregiver  - Arrange for needed discharge resources and transportation as appropriate  - Identify discharge learning needs (meds, wound care, etc.)  - Arrange for interpretive services to assist at discharge as needed  - Refer to Case Management Department for coordinating discharge planning if the patient needs post-hospital services based on physician/advanced practitioner order or complex needs related to functional status, cognitive ability, or social support system  Outcome: Progressing     Problem: Knowledge Deficit  Goal: Patient/family/caregiver demonstrates understanding of disease process, treatment plan, medications, and discharge instructions  Description: Complete learning assessment and assess knowledge base.  Interventions:  - Provide teaching at level of understanding  - Provide teaching via preferred learning methods  Outcome: Progressing     Problem: CARDIOVASCULAR - ADULT  Goal: Maintains optimal cardiac output and hemodynamic stability  Description: INTERVENTIONS:  - Monitor I/O, vital signs and rhythm  - Monitor for S/S and trends of decreased cardiac output  - Administer and titrate ordered vasoactive medications to optimize hemodynamic stability  - Assess quality of pulses, skin color and temperature  - Assess for signs of decreased coronary artery perfusion  - Instruct patient to report change in severity of symptoms  Outcome: Progressing  Goal: Absence of cardiac dysrhythmias or at baseline rhythm  Description: INTERVENTIONS:  - Continuous cardiac monitoring, vital signs, obtain 12 lead EKG if  ordered  - Administer antiarrhythmic and heart rate control medications as ordered  - Monitor electrolytes and administer replacement therapy as ordered  Outcome: Progressing     Problem: RESPIRATORY - ADULT  Goal: Achieves optimal ventilation and oxygenation  Description: INTERVENTIONS:  - Assess for changes in respiratory status  - Assess for changes in mentation and behavior  - Position to facilitate oxygenation and minimize respiratory effort  - Oxygen administered by appropriate delivery if ordered  - Initiate smoking cessation education as indicated  - Encourage broncho-pulmonary hygiene including cough, deep breathe, Incentive Spirometry  - Assess the need for suctioning and aspirate as needed  - Assess and instruct to report SOB or any respiratory difficulty  - Respiratory Therapy support as indicated  Outcome: Progressing     Problem: METABOLIC, FLUID AND ELECTROLYTES - ADULT  Goal: Electrolytes maintained within normal limits  Description: INTERVENTIONS:  - Monitor labs and assess patient for signs and symptoms of electrolyte imbalances  - Administer electrolyte replacement as ordered  - Monitor response to electrolyte replacements, including repeat lab results as appropriate  - Instruct patient on fluid and nutrition as appropriate  Outcome: Progressing  Goal: Fluid balance maintained  Description: INTERVENTIONS:  - Monitor labs   - Monitor I/O and WT  - Instruct patient on fluid and nutrition as appropriate  - Assess for signs & symptoms of volume excess or deficit  Outcome: Progressing

## 2024-02-29 NOTE — CASE MANAGEMENT
Case Management Discharge Planning Note    Patient name Mattie Joy  Location /-01 MRN [id number]  : 1960 Date 2024       Current Admission Date: 2024  Current Admission Diagnosis:Acute on chronic respiratory failure (HCC)   Patient Active Problem List    Diagnosis Date Noted    Eosinophilic asthma 2023    COPD, severe (HCC) 2023    Hepatic steatosis 2023    Morbid obesity (HCC) 02/15/2023    Diabetic polyneuropathy associated with type 2 diabetes mellitus (HCC) 11/15/2021    Type 2 diabetes mellitus with hyperglycemia (HCC) 2021    Tubular adenoma of colon 2021    Transaminitis 2021    Gastric polyp 2021    Class 3 severe obesity in adult (HCC) 2021    Pulmonary hypertension (HCC) 2020    Insomnia 10/28/2020    Abnormal CT of the chest 2020    Lactic acidosis 2020    Acute on chronic diastolic CHF (congestive heart failure) (HCC) 2020    Microcytic anemia 2020    Neck pain, chronic 2019    Current moderate episode of major depressive disorder without prior episode (HCC) 2019    Paroxysmal atrial fibrillation (HCC) 2018    Severe persistent asthma 2018    Hypokalemia 2018    Acute on chronic respiratory failure (HCC) 2018    Abnormal computed tomography angiography (CTA) of abdomen 2018    Abnormal CT of the abdomen 2018    Iron deficiency anemia due to chronic blood loss 2018    Type 2 diabetes mellitus with stage 2 chronic kidney disease, with long-term current use of insulin (HCC)     Ganglion cyst of flexor tendon sheath 2017    Paresthesia of upper extremity 2017    Cervical radiculopathy 2017    Elevated liver enzymes 2015    Sexual dysfunction 2014    Chronic low back pain 10/15/2014    Hypercholesterolemia 2014    Lumbar radiculopathy 2014    Esophageal reflux 2014    Hypertensive heart and  chronic kidney disease with heart failure and stage 1 through stage 4 chronic kidney disease, or chronic kidney disease (HCC) 03/24/2014    Obstructive sleep apnea 03/09/2014      LOS (days): 3  Geometric Mean LOS (GMLOS) (days): 3.9  Days to GMLOS:0.9     OBJECTIVE:  Risk of Unplanned Readmission Score: 37.96         Current admission status: Inpatient   [place]erred Pharmacy:   [facility] [address]  [address] [address]  Phone: [phone] Fax: [phone]    [facility] - [place], [place] - [address]  [address]  [address]  [facility] [address]  Phone: [phone] Fax: [phone]    Primary Care Provider: Laith Olivares MD    Primary Insurance: MEDICARE  Secondary Insurance: AETNA    DISCHARGE DETAILS:      Requested Home Health Care         Home Health Discipline requested:: Home Health Aide, Nursing, Occupational Therapy, Physical Therapy, Medical Social Work  Home Health Agency Name:: [facility]  HHA External Referral Reason (only applicable if external HHA name selected): Patient has established relationship with provider  Home Health Follow-Up Provider:: PCP  Home Health Services Needed:: COPD Management, Evaluate Functional Status and Safety, Gait/ADL Training, Strengthening/Theraputic Exercises to Improve Function, Oxygen Via Nasal Cannula  Homebound Criteria Met:: Requires the Assistance of Another Person for Safe Ambulation or to Leave the Home  Supporting Clincal Findings:: Limited Endurance     Additional Comments: Met with pt and pt's friend Cecy to discuss dc planning. Cecy was asking about SNF. CM explained that pt is independently ambulating in her room. No need for rehab at this [facility]. Resources provided. Pt is agreeable to home therapy, nursing, MSW, and HHA through [facility]; update provided to [facility].

## 2024-02-29 NOTE — ASSESSMENT & PLAN NOTE
On metoprolol 100 mg daily and Eliquis 5 twice daily  Discussed with cardiology, will continue 100 mg metoprolol for now and monitor if we will need to increase the dose.  Continue

## 2024-03-01 ENCOUNTER — RA CDI HCC (OUTPATIENT)
Dept: OTHER | Facility: HOSPITAL | Age: 64
End: 2024-03-01

## 2024-03-01 LAB
ANION GAP SERPL CALCULATED.3IONS-SCNC: 7 MMOL/L
BUN SERPL-MCNC: 18 MG/DL (ref 5–25)
CALCIUM SERPL-MCNC: 8.7 MG/DL (ref 8.4–10.2)
CHLORIDE SERPL-SCNC: 93 MMOL/L (ref 96–108)
CO2 SERPL-SCNC: 38 MMOL/L (ref 21–32)
CREAT SERPL-MCNC: 0.64 MG/DL (ref 0.6–1.3)
ERYTHROCYTE [DISTWIDTH] IN BLOOD BY AUTOMATED COUNT: 18.8 % (ref 11.6–15.1)
GFR SERPL CREATININE-BSD FRML MDRD: 93 ML/MIN/1.73SQ M
GLUCOSE SERPL-MCNC: 123 MG/DL (ref 65–140)
GLUCOSE SERPL-MCNC: 154 MG/DL (ref 65–140)
GLUCOSE SERPL-MCNC: 89 MG/DL (ref 65–140)
HCT VFR BLD AUTO: 35.4 % (ref 34.8–46.1)
HGB BLD-MCNC: 10 G/DL (ref 11.5–15.4)
MCH RBC QN AUTO: 19.1 PG (ref 26.8–34.3)
MCHC RBC AUTO-ENTMCNC: 28.2 G/DL (ref 31.4–37.4)
MCV RBC AUTO: 68 FL (ref 82–98)
PLATELET # BLD AUTO: 306 THOUSANDS/UL (ref 149–390)
PMV BLD AUTO: 10.8 FL (ref 8.9–12.7)
POTASSIUM SERPL-SCNC: 3.4 MMOL/L (ref 3.5–5.3)
RBC # BLD AUTO: 5.24 MILLION/UL (ref 3.81–5.12)
SODIUM SERPL-SCNC: 138 MMOL/L (ref 135–147)
WBC # BLD AUTO: 11.64 THOUSAND/UL (ref 4.31–10.16)

## 2024-03-01 PROCEDURE — 80048 BASIC METABOLIC PNL TOTAL CA: CPT | Performed by: INTERNAL MEDICINE

## 2024-03-01 PROCEDURE — 85027 COMPLETE CBC AUTOMATED: CPT | Performed by: INTERNAL MEDICINE

## 2024-03-01 PROCEDURE — 94760 N-INVAS EAR/PLS OXIMETRY 1: CPT

## 2024-03-01 PROCEDURE — 94002 VENT MGMT INPAT INIT DAY: CPT

## 2024-03-01 PROCEDURE — 82948 REAGENT STRIP/BLOOD GLUCOSE: CPT

## 2024-03-01 PROCEDURE — 99232 SBSQ HOSP IP/OBS MODERATE 35: CPT | Performed by: INTERNAL MEDICINE

## 2024-03-01 PROCEDURE — 94640 AIRWAY INHALATION TREATMENT: CPT

## 2024-03-01 PROCEDURE — 94660 CPAP INITIATION&MGMT: CPT

## 2024-03-01 RX ORDER — SPIRONOLACTONE 25 MG/1
100 TABLET ORAL DAILY
Status: DISCONTINUED | OUTPATIENT
Start: 2024-03-02 | End: 2024-03-05

## 2024-03-01 RX ORDER — METOPROLOL SUCCINATE 50 MG/1
50 TABLET, EXTENDED RELEASE ORAL EVERY EVENING
Status: DISCONTINUED | OUTPATIENT
Start: 2024-03-01 | End: 2024-03-01

## 2024-03-01 RX ORDER — POTASSIUM CHLORIDE 20 MEQ/1
40 TABLET, EXTENDED RELEASE ORAL 2 TIMES DAILY
Status: COMPLETED | OUTPATIENT
Start: 2024-03-01 | End: 2024-03-01

## 2024-03-01 RX ORDER — METOPROLOL SUCCINATE 50 MG/1
50 TABLET, EXTENDED RELEASE ORAL EVERY EVENING
Status: DISCONTINUED | OUTPATIENT
Start: 2024-03-01 | End: 2024-03-06 | Stop reason: HOSPADM

## 2024-03-01 RX ORDER — POTASSIUM CHLORIDE 20 MEQ/1
40 TABLET, EXTENDED RELEASE ORAL 2 TIMES DAILY
Status: DISCONTINUED | OUTPATIENT
Start: 2024-03-01 | End: 2024-03-01

## 2024-03-01 RX ORDER — METOPROLOL SUCCINATE 50 MG/1
100 TABLET, EXTENDED RELEASE ORAL EVERY MORNING
Status: DISCONTINUED | OUTPATIENT
Start: 2024-03-02 | End: 2024-03-06 | Stop reason: HOSPADM

## 2024-03-01 RX ADMIN — BUDESONIDE 0.5 MG: 0.5 INHALANT ORAL at 07:04

## 2024-03-01 RX ADMIN — APIXABAN 5 MG: 5 TABLET, FILM COATED ORAL at 08:37

## 2024-03-01 RX ADMIN — SPIRONOLACTONE 50 MG: 25 TABLET ORAL at 08:37

## 2024-03-01 RX ADMIN — MONTELUKAST 10 MG: 10 TABLET, FILM COATED ORAL at 21:05

## 2024-03-01 RX ADMIN — METOPROLOL SUCCINATE 100 MG: 50 TABLET, EXTENDED RELEASE ORAL at 08:36

## 2024-03-01 RX ADMIN — INSULIN LISPRO 25 UNITS: 100 INJECTION, SOLUTION INTRAVENOUS; SUBCUTANEOUS at 08:43

## 2024-03-01 RX ADMIN — PANTOPRAZOLE SODIUM 40 MG: 40 TABLET, DELAYED RELEASE ORAL at 06:33

## 2024-03-01 RX ADMIN — Medication 0.5 MG/HR: at 21:09

## 2024-03-01 RX ADMIN — INSULIN LISPRO 25 UNITS: 100 INJECTION, SOLUTION INTRAVENOUS; SUBCUTANEOUS at 17:44

## 2024-03-01 RX ADMIN — ACETAMINOPHEN 650 MG: 325 TABLET, FILM COATED ORAL at 21:08

## 2024-03-01 RX ADMIN — INSULIN LISPRO 2 UNITS: 100 INJECTION, SOLUTION INTRAVENOUS; SUBCUTANEOUS at 13:03

## 2024-03-01 RX ADMIN — POTASSIUM CHLORIDE 40 MEQ: 1500 TABLET, EXTENDED RELEASE ORAL at 17:42

## 2024-03-01 RX ADMIN — ATORVASTATIN CALCIUM 40 MG: 40 TABLET, FILM COATED ORAL at 08:37

## 2024-03-01 RX ADMIN — LIDOCAINE 5% 1 PATCH: 700 PATCH TOPICAL at 08:37

## 2024-03-01 RX ADMIN — BUDESONIDE 0.5 MG: 0.5 INHALANT ORAL at 20:31

## 2024-03-01 RX ADMIN — FLUTICASONE PROPIONATE 1 SPRAY: 50 SPRAY, METERED NASAL at 08:42

## 2024-03-01 RX ADMIN — INSULIN LISPRO 25 UNITS: 100 INJECTION, SOLUTION INTRAVENOUS; SUBCUTANEOUS at 13:02

## 2024-03-01 RX ADMIN — SENNOSIDES AND DOCUSATE SODIUM 1 TABLET: 8.6; 5 TABLET ORAL at 08:37

## 2024-03-01 RX ADMIN — APIXABAN 5 MG: 5 TABLET, FILM COATED ORAL at 17:43

## 2024-03-01 RX ADMIN — INSULIN GLARGINE 55 UNITS: 100 INJECTION, SOLUTION SUBCUTANEOUS at 21:05

## 2024-03-01 RX ADMIN — POLYETHYLENE GLYCOL 3350 17 G: 17 POWDER, FOR SOLUTION ORAL at 08:36

## 2024-03-01 RX ADMIN — NYSTATIN: 100000 POWDER TOPICAL at 08:42

## 2024-03-01 RX ADMIN — POTASSIUM CHLORIDE 40 MEQ: 1500 TABLET, EXTENDED RELEASE ORAL at 08:37

## 2024-03-01 RX ADMIN — TRAZODONE HYDROCHLORIDE 150 MG: 150 TABLET ORAL at 21:05

## 2024-03-01 RX ADMIN — METOPROLOL SUCCINATE 50 MG: 50 TABLET, EXTENDED RELEASE ORAL at 17:42

## 2024-03-01 RX ADMIN — NYSTATIN: 100000 POWDER TOPICAL at 17:46

## 2024-03-01 RX ADMIN — EMPAGLIFLOZIN 10 MG: 10 TABLET, FILM COATED ORAL at 08:46

## 2024-03-01 RX ADMIN — FLUTICASONE PROPIONATE 1 SPRAY: 50 SPRAY, METERED NASAL at 21:05

## 2024-03-01 RX ADMIN — DULOXETINE HYDROCHLORIDE 60 MG: 30 CAPSULE, DELAYED RELEASE ORAL at 08:37

## 2024-03-01 NOTE — PROGRESS NOTES
[facility]  Progress Note  Name: Mattie Joy I  MRN: [id number]  Unit/Bed#: -01 I Date of Admission: 2/26/2024   Date of Service: 3/1/2024 I Hospital Day: 4    Assessment/Plan   * Acute on chronic respiratory failure (HCC)  Assessment & Plan  At baseline on 4 L nasal cannula oxygen secondary to severe COPD, pulmonary hypertension  Required up to 7 L nasal cannula oxygen on presentation  Likely decompensated secondary to CHF  Flu/COVID/RSV negative  Monitor with diuresis    Currently saturating fine at 4 L nasal cannula oxygen which is her baseline    Acute on chronic diastolic CHF (congestive heart failure) ([facility])  Assessment & Plan  Wt Readings from Last 3 Encounters:   03/01/24 122 kg (269 lb 3.2 oz)   02/26/24 127 kg (279 lb)   02/26/24 127 kg (279 lb)     Seen in the cardiology office 2/26  Approximately 15 pounds weight gain in the past 2 weeks.  Noncompliant with salt diet  On Bumex 6 mg twice daily recently increased   patient obese  Chest x-ray with congestion  Last echo August 2023 showed LVEF 65% diastolic dysfunction 2, R VSP 61 mmHg  Started on Bumex drip, had 5.5 L urine output/24 hours , negative 16.8 L since admission     Plan:   Continue Bumex drip  Strict intake output  Daily weights  Low-salt diet  Continue metoprolol and Jardiance  Increase.  Aldactone 100 mg tomorrow                 COPD, severe ([facility])  Assessment & Plan  Followed with pulmonology.  On albuterol nebulizers as needed, Pulmicort, Claritin, Singulair, bevespi   Noncompliant with medication.  Continue Pulmicort/albuterol, Claritin, Singulair    Not in COPD exacerbation currently    Morbid obesity ([facility])  Assessment & Plan  Lifestyle changes encouraged     Current moderate episode of major depressive disorder without prior episode ([facility])  Assessment & Plan  On Cymbalta, trazodone and Lexapro.  Reported Lexapro was discontinued  Continue Cymbalta and trazodone    Paroxysmal atrial fibrillation  ([facility])  Assessment & Plan  On metoprolol 100 mg daily and Eliquis 5 twice daily  Discussed with cardiology, will continue 100 mg metoprolol for now and monitor if we will need to increase the dose.  Continue    Type 2 diabetes mellitus with stage 2 chronic kidney disease, with long-term current use of insulin ([facility])  Assessment & Plan  Lab Results   Component Value Date    HGBA1C 8.5 (H) 01/22/2024       Recent Labs     02/29/24  1658 02/29/24  2127 03/01/24  0736 03/01/24  1147   POCGLU 151* 123 123 154*         Blood Sugar Average: Last 72 hrs:  (P) [id number]  On Lantus 55 units at bedtime and Apidra 25 units 3 times daily with meals.  Continue Lantus 85 units at bedtime, Humalog 25 units 3 times daily with meals and sliding scale  Diabetic diet  hypoGlycemia protocol    Constipation-resolved as of 2/29/2024  Assessment & Plan  Started bowel regimen             VTE Pharmacologic Prophylaxis: VTE Score: 6 High Risk (Score >/= 5) - Pharmacological DVT Prophylaxis Ordered: apixaban (Eliquis). Sequential Compression Devices Ordered.    Mobility:   Basic Mobility Inpatient Raw Score: 20  JH-HLM Goal: 6: Walk 10 steps or more  JH-HLM Achieved: 7: Walk 25 feet or more  HLM Goal achieved. Continue to encourage appropriate mobility.    Patient Centered Rounds: I performed bedside rounds with nursing staff today.   Discussions with Specialists or Other Care Team Provider: cm, cardiology    Education and Discussions with Family / Patient: Patient declined call to .     Total Time Spent on Date of Encounter in care of patient: 40 mins. This time was spent on one or more of the following: performing physical exam; counseling and coordination of care; obtaining or reviewing history; documenting in the medical record; reviewing/ordering tests, medications or procedures; communicating with other healthcare professionals and discussing with patient's family/caregivers.    Current Length of Stay: 4  day(s)  Current Patient Status: Inpatient   Certification Statement: The patient will continue to require additional inpatient hospital stay due to chf  Discharge Plan: Anticipate discharge in >72 hrs to home.    Code Status: Level 1 - Full Code    Subjective:     Had 2 bm this am. Breathing better     Objective:     Vitals:   Temp (24hrs), Av.2 °F (36.8 °C), Min:97.6 °F (36.4 °C), Max:98.9 °F (37.2 °C)    Temp:  [97.6 °F (36.4 °C)-98.9 °F (37.2 °C)] 97.6 °F (36.4 °C)  HR:  [] 108  BP: ()/(54-63) 104/56  SpO2:  [94 %-99 %] 97 %  Body mass index is 47.69 kg/m².     Input and Output Summary (last 24 hours):     Intake/Output Summary (Last 24 hours) at 3/1/2024 1427  Last data filed at 3/1/2024 0857  Gross per 24 hour   Intake 598 ml   Output 3400 ml   Net -2802 ml       Physical Exam:   Physical Exam  Vitals and nursing note reviewed.   Constitutional:       General: She is not in acute distress.     Appearance: She is not diaphoretic.   HENT:      Head: Normocephalic.   Eyes:      General: No scleral icterus.        Right eye: No discharge.         Left eye: No discharge.   Cardiovascular:      Rate and Rhythm: Normal rate. Rhythm irregular.      Heart sounds: No murmur heard.  Pulmonary:      Effort: Pulmonary effort is normal. No respiratory distress.      Breath sounds: Normal breath sounds. No wheezing, rhonchi or rales.   Abdominal:      General: There is no distension.      Palpations: Abdomen is soft.      Tenderness: There is no abdominal tenderness. There is no guarding or rebound.   Musculoskeletal:      Cervical back: Normal range of motion.      Right lower leg: No edema.      Left lower leg: No edema.   Skin:     General: Skin is warm.      Coloration: Skin is pale.   Neurological:      Mental Status: She is alert and oriented to person, place, and time.   Psychiatric:         Mood and Affect: Mood normal.         Behavior: Behavior normal.         Thought Content: Thought content normal.          Judgment: Judgment normal.          Additional Data:     Labs:  Results from last 7 days   Lab Units 03/01/24  0654 02/28/24  0229 02/27/24  0441   WBC Thousand/uL 11.64*   < > 11.04*   HEMOGLOBIN g/dL 10.0*   < > 9.3*   HEMATOCRIT % 35.4   < > 33.3*   PLATELETS Thousands/uL 306   < > 263   NEUTROS PCT %  --   --  71   LYMPHS PCT %  --   --  19   MONOS PCT %  --   --  9   EOS PCT %  --   --  0    < > = values in this interval not displayed.     Results from last 7 days   Lab Units 03/01/24  0654 02/26/24  1423 02/26/24  1408   SODIUM mmol/L 138   < > 139   POTASSIUM mmol/L 3.4*   < > 3.6   CHLORIDE mmol/L 93*   < > 94*   CO2 mmol/L 38*   < > 37*   CO2, I-STAT   --    < >  --    BUN mg/dL 18   < > 16   CREATININE mg/dL 0.64   < > 0.72   ANION GAP mmol/L 7   < > 8   CALCIUM mg/dL 8.7   < > 9.3   ALBUMIN g/dL  --   --  4.1   TOTAL BILIRUBIN mg/dL  --   --  0.39   ALK PHOS U/L  --   --  147*   ALT U/L  --   --  26   AST U/L  --   --  20   GLUCOSE RANDOM mg/dL 123   < > 83    < > = values in this interval not displayed.     Results from last 7 days   Lab Units 02/26/24  1408   INR  1.33*     Results from last 7 days   Lab Units 03/01/24  1147 03/01/24  0736 02/29/24  2127 02/29/24  1658 02/29/24  1111 02/29/24  0714 02/28/24  2041 02/28/24  1650 02/28/24  1106 02/28/24  0722 02/28/24  0234 02/27/24  2038   POC GLUCOSE mg/dl 154* 123 123 151* 195* 149* 225* 138 157* 197* 188* 127         Results from last 7 days   Lab Units 02/26/24  1648 02/26/24  1408   LACTIC ACID mmol/L 0.8 2.3*   PROCALCITONIN ng/ml  --  0.06       Lines/Drains:  Invasive Devices       Peripheral Intravenous Line  Duration             Peripheral IV 02/26/24 Right Antecubital 4 days                      Telemetry:  Telemetry Orders (From admission, onward)               24 Hour Telemetry Monitoring  Continuous x 24 Hours (Telem)        Question:  Reason for 24 Hour Telemetry  Answer:  Decompensated CHF- and any one of the following: continuous  diuretic infusion or total diuretic dose >200 mg daily, associated electrolyte derangement (I.e. K < 3.0), ionotropic drip (continuous infusion), hx of ventricular arrhythmia, or new EF < 35%                     Telemetry Reviewed: Atrial fibrillation. HR averaging 100  Indication for Continued Telemetry Use: Acute CHF on >200 mg lasix/day or equivalent dose or with new reduced EF.              Imaging: No pertinent imaging reviewed.    Recent Cultures (last 7 days):   Results from last 7 days   Lab Units 02/26/24  2120 02/26/24  1419 02/26/24  1408   BLOOD CULTURE   --  No Growth at 72 hrs. No Growth at 72 hrs.   URINE CULTURE  40,000-49,000 cfu/ml  --   --        Last 24 Hours Medication List:   Current Facility-Administered Medications   Medication Dose Route Frequency Provider Last Rate    acetaminophen  650 mg Oral Q6H PRN Rosamaria Miller PA-C      albuterol  2 puff Inhalation Q4H PRN Magdalena Fleming MD      albuterol  2.5 mg Nebulization Q6H PRN Magdalena Fleming MD      apixaban  5 mg Oral BID Magdalena Fleming MD      atorvastatin  40 mg Oral Daily Magdalena Fleming MD      budesonide  0.5 mg Nebulization Q12H Magdalena Fleming MD      bumetanide (BUMEX) 12.5 mg infusion 50 mL  0.5 mg/hr Intravenous Continuous Magdalena Fleming MD 0.5 mg/hr (02/29/24 2200)    DULoxetine  60 mg Oral Daily Magdalena Fleming MD      Empagliflozin  10 mg Oral Daily Magdalena Fleming MD      fluticasone  1 spray Nasal BID Magdalena Fleming MD      insulin glargine  55 Units Subcutaneous HS Magdalena Fleming MD      insulin lispro  1-5 Units Subcutaneous HS Magdalena Fleming MD      insulin lispro  2-12 Units Subcutaneous TID AC Magdalena Fleming MD      insulin lispro  25 Units Subcutaneous TID With Meals Magdalena Fleming MD      lidocaine  1 patch Topical Daily Magdalena Fleming MD      loratadine  10 mg Oral Daily PRN Magdalena Fleming MD      LORazepam  0.5 mg Oral Daily PRN Magdalena  MD Lonnie      [START ON 3/2/2024] metoprolol succinate  100 mg Oral QAM Fran John PA-C      metoprolol succinate  50 mg Oral QPM Fran John PA-C      montelukast  10 mg Oral HS Magdalena Fleming MD      nystatin   Topical BID Magdalena Fleming MD      pantoprazole  40 mg Oral Early Morning Magdalena Fleming MD      potassium chloride  40 mEq Oral BID Magdalena Fleming MD      [START ON 3/2/2024] spironolactone  100 mg Oral Daily Magdalena Fleming MD      traZODone  150 mg Oral HS Magdalena Fleming MD          Today, Patient Was Seen By: Magdalena Fleming MD    **Please Note: This note may have been constructed using a voice recognition system.**

## 2024-03-01 NOTE — PROGRESS NOTES
"Cardiology Progress Note   Mattie Joy 63 y.o. female MRN: [id number]    Unit/Bed#: -01 Encounter: [id number]      ASSESSMENT:  Acute on chronic diastolic heart failure  8/15/2023 echo: LVEF 65%, grade 2 diastolic dysfunction, severely elevated RV systolic pressure 61 mmHg  Current diuretic: Bumex 5 BID  Wt as low as 265 in the hospital February 2024  Persistent atrial fibrillation  Thromboembolic PPx: Eliquis 5 twice daily  Rate control: Toprol- daily  COPD with acute exacerbation  Type 2 diabetes  Obesity, BMI 51  Pulmonary hypertension likely group 2/3 from left heart disease, underlying lung disease, OHV     Discussion:   BMP stable overall; replenish for goal K>4 and Mg>2  Net -4.6L overnight. -16.8L since admit. Standing weight today 269lbs  RVR noted overnight  BP has prevented aggressive up-titration of her GDMT.  We continue to  her on adherence to a salt/fluid restricted intake.       Plan:   Continue bumex gtt at current rate until her creatinine worsens then switch to PO Bumex 6mg BID   Daily BMP and standing weights  Replenish K for goal >4 and Mg for goal >2  Strict I&O charting  Increase Toprol XL to 150mg/daily due to RVR episodes overnight   Continue telemetry monitoring   Continue Aldactone 50 mg daily, Jardiance 10 mg daily  She will need very close outpatient follow up 24-48 hours post discharge; she continues to be high-risk of readmission for CHF due to her dietary non-compliance.   She would benefit from Zoll HF monitor in the outpatient setting.   Will see again Monday 3/4/2024 unless otherwise called    Subjective:   Patient seen and examined. Overnight events reviewed.     Objective:     Vitals: Blood pressure 104/56, pulse (!) 108, temperature 97.6 °F (36.4 °C), resp. rate 20, height 5' 3\" (1.6 m), weight 122 kg (269 lb 3.2 oz), SpO2 97%, not currently breastfeeding., Body mass index is 47.69 kg/m².,   Orthostatic Blood Pressures      Flowsheet Row Most Recent Value "   Blood Pressure 104/56 filed at 03/01/2024 0738   Patient Position - Orthostatic VS Sitting filed at 02/27/2024 1605              Intake/Output Summary (Last 24 hours) at 3/1/2024 0845  Last data filed at 3/1/2024 0556  Gross per 24 hour   Intake 660 ml   Output 5325 ml   Net -4665 ml         Physical Exam:    GEN: Mattie Joy appears well, alert and oriented x 3, pleasant and cooperative   HEENT: Sclera anicteric, conjunctivae pink, mucous membranes moist. Oropharynx clear.   NECK: supple, no significant JVD, Trachea midline, no thyromegaly.   HEART: regular rhythm, normal S1 and S2, no murmurs, clicks, gallops or rubs   LUNGS: clear to auscultation bilaterally; no wheezes, rales, or rhonchi. No increased work of breathing or signs of respiratory distress.   ABDOMEN: Soft, nontender, nondistended  EXTREMITIES: Skin warm and well perfused, no clubbing, cyanosis, or edema.  NEURO: No focal findings. Normal speech. Mood and affect normal.   SKIN: Normal without suspicious lesions on exposed skin.      Medications:      Current Facility-Administered Medications:     acetaminophen (TYLENOL) tablet 650 mg, 650 mg, Oral, Q6H PRN, Rosamaria Miller PA-C, 650 mg at 02/29/24 2154    albuterol (PROVENTIL HFA,VENTOLIN HFA) inhaler 2 puff, 2 puff, Inhalation, Q4H PRN, Magdalena Fleming MD, 2 puff at 02/26/24 1822    albuterol inhalation solution 2.5 mg, 2.5 mg, Nebulization, Q6H PRN, Magdalena Fleming MD    apixaban (ELIQUIS) tablet 5 mg, 5 mg, Oral, BID, Magdalena Fleming MD, 5 mg at 03/01/24 0837    atorvastatin (LIPITOR) tablet 40 mg, 40 mg, Oral, Daily, Magdalena Fleming MD, 40 mg at 03/01/24 0837    budesonide (PULMICORT) inhalation solution 0.5 mg, 0.5 mg, Nebulization, Q12H, Magdalena Fleming MD, 0.5 mg at 03/01/24 0704    bumetanide (BUMEX) 12.5 mg infusion 50 mL, 0.5 mg/hr, Intravenous, Continuous, Magdalena Fleming MD, Last Rate: 2 mL/hr at 02/29/24 2200, 0.5 mg/hr at 02/29/24 2200    DULoxetine  (CYMBALTA) delayed release capsule 60 mg, 60 mg, Oral, Daily, Magdalena Fleming MD, 60 mg at 03/01/24 0837    Empagliflozin (JARDIANCE) tablet 10 mg, 10 mg, Oral, Daily, Magdalena Fleming MD, 10 mg at 02/29/24 0916    fluticasone (FLONASE) 50 mcg/act nasal spray 1 spray, 1 spray, Nasal, BID, Magdalena Fleming MD, 1 spray at 03/01/24 0842    insulin glargine (LANTUS) subcutaneous injection 55 Units 0.55 mL, 55 Units, Subcutaneous, HS, Magdalena Fleming MD, 55 Units at 02/29/24 2151    insulin lispro (HumaLOG) 100 units/mL subcutaneous injection 1-5 Units, 1-5 Units, Subcutaneous, HS, Magdalena Fleming MD, 2 Units at 02/28/24 2057    insulin lispro (HumaLOG) 100 units/mL subcutaneous injection 2-12 Units, 2-12 Units, Subcutaneous, TID AC, 2 Units at 02/29/24 1750 **AND** Fingerstick Glucose (POCT), , , TID AC, Magdalena Fleming MD    insulin lispro (HumaLOG) 100 units/mL subcutaneous injection 25 Units, 25 Units, Subcutaneous, TID With Meals, Magdalena Fleming MD, 25 Units at 03/01/24 0843    lidocaine (LIDODERM) 5 % patch 1 patch, 1 patch, Topical, Daily, Magdalena Fleming MD, 1 patch at 03/01/24 0837    loratadine (CLARITIN) tablet 10 mg, 10 mg, Oral, Daily PRN, Magdalena Fleming MD    LORazepam (ATIVAN) tablet 0.5 mg, 0.5 mg, Oral, Daily PRN, Magdalena Fleming MD    metoprolol succinate (TOPROL-XL) 24 hr tablet 100 mg, 100 mg, Oral, Daily, Fran John PA-C, 100 mg at 03/01/24 0836    montelukast (SINGULAIR) tablet 10 mg, 10 mg, Oral, HS, Magdalena Fleming MD, 10 mg at 02/29/24 2152    nystatin (MYCOSTATIN) powder, , Topical, BID, Magdalena Fleming MD, Given at 03/01/24 0842    pantoprazole (PROTONIX) EC tablet 40 mg, 40 mg, Oral, Early Morning, Magdalena Fleming MD, 40 mg at 03/01/24 0633    polyethylene glycol (MIRALAX) packet 17 g, 17 g, Oral, Daily, Magdalena Fleming MD, 17 g at 03/01/24 0836    potassium chloride (Klor-Con M20) CR tablet 40 mEq, 40 mEq, Oral, BID,  Magdalena Fleming MD, 40 mEq at 03/01/24 0837    senna-docusate sodium (SENOKOT S) 8.6-50 mg per tablet 1 tablet, 1 tablet, Oral, BID, Magdalena Fleming MD, 1 tablet at 03/01/24 0837    spironolactone (ALDACTONE) tablet 50 mg, 50 mg, Oral, Daily, Magdalena Fleming MD, 50 mg at 03/01/24 0837    traZODone (DESYREL) tablet 150 mg, 150 mg, Oral, HS, Magdalena Fleming MD, 150 mg at 02/29/24 2152     Labs & Results:        Results from last 7 days   Lab Units 03/01/24  0654 02/29/24  0450 02/28/24  0229   WBC Thousand/uL 11.64* 10.67* 10.23*   HEMOGLOBIN g/dL 10.0* 10.5* 10.7*   HEMATOCRIT % 35.4 37.4 38.5   PLATELETS Thousands/uL 306 290 294         Results from last 7 days   Lab Units 03/01/24  0654 02/29/24  0451 02/28/24  0229 02/27/24  0441 02/26/24  1423 02/26/24  1408   POTASSIUM mmol/L 3.4* 3.5 3.5   < >  --  3.6   CHLORIDE mmol/L 93* 93* 93*   < >  --  94*   CO2 mmol/L 38* 39* 33*   < >  --  37*   CO2, I-STAT mmol/L  --   --   --   --  33*  --    BUN mg/dL 18 18 16   < >  --  16   CREATININE mg/dL 0.64 0.74 0.73   < >  --  0.72   CALCIUM mg/dL 8.7 8.6 8.5   < >  --  9.3   ALK PHOS U/L  --   --   --   --   --  147*   ALT U/L  --   --   --   --   --  26   AST U/L  --   --   --   --   --  20   GLUCOSE, ISTAT mg/dl  --   --   --   --  87  --     < > = values in this interval not displayed.     Results from last 7 days   Lab Units 02/26/24  1408   INR  1.33*   PTT seconds 32

## 2024-03-01 NOTE — PROGRESS NOTES
HCC coding opportunities          Chart Reviewed number of suggestions sent to Provider: 3   E11.42  E11.65  I13.0    Patients Insurance     Medicare Insurance: Medicare

## 2024-03-01 NOTE — PLAN OF CARE
Problem: Potential for Falls  Goal: Patient will remain free of falls  Description: INTERVENTIONS:  - Educate patient/family on patient safety including physical limitations  - Instruct patient to call for assistance with activity   - Consult OT/PT to assist with strengthening/mobility   - Keep Call bell within reach  - Keep bed low and locked with side rails adjusted as appropriate  - Keep care items and personal belongings within reach  - Initiate and maintain comfort rounds  - Make Fall Risk Sign visible to staff  - Obtain necessary fall risk management equipment: non-slip socks  - Apply yellow socks and bracelet for high fall risk patients  - Consider moving patient to room near nurses station  Outcome: Progressing     Problem: PAIN - ADULT  Goal: Verbalizes/displays adequate comfort level or baseline comfort level  Description: Interventions:  - Encourage patient to monitor pain and request assistance  - Assess pain using appropriate pain scale  - Administer analgesics based on type and severity of pain and evaluate response  - Implement non-pharmacological measures as appropriate and evaluate response  - Consider cultural and social influences on pain and pain management  - Notify physician/advanced practitioner if interventions unsuccessful or patient reports new pain  Outcome: Progressing

## 2024-03-01 NOTE — ASSESSMENT & PLAN NOTE
Wt Readings from Last 3 Encounters:   03/01/24 122 kg (269 lb 3.2 oz)   02/26/24 127 kg (279 lb)   02/26/24 127 kg (279 lb)     Seen in the cardiology office 2/26  Approximately 15 pounds weight gain in the past 2 weeks.  Noncompliant with salt diet  On Bumex 6 mg twice daily recently increased   patient obese  Chest x-ray with congestion  Last echo August 2023 showed LVEF 65% diastolic dysfunction 2, R VSP 61 mmHg  Started on Bumex drip, had 5.5 L urine output/24 hours , negative 16.8 L since admission     Plan:   Continue Bumex drip  Strict intake output  Daily weights  Low-salt diet  Continue metoprolol and Jardiance  Increase.  Aldactone 100 mg tomorrow

## 2024-03-01 NOTE — PLAN OF CARE
Problem: Potential for Falls  Goal: Patient will remain free of falls  Description: INTERVENTIONS:  - Educate patient/family on patient safety including physical limitations  - Instruct patient to call for assistance with activity   - Consult OT/PT to assist with strengthening/mobility   - Keep Call bell within reach  - Keep bed low and locked with side rails adjusted as appropriate  - Keep care items and personal belongings within reach  - Initiate and maintain comfort rounds  - Make Fall Risk Sign visible to staff  - Obtain necessary fall risk management equipment: non-slip socks  - Apply yellow socks and bracelet for high fall risk patients  - Consider moving patient to room near nurses station  Outcome: Progressing     Problem: PAIN - ADULT  Goal: Verbalizes/displays adequate comfort level or baseline comfort level  Description: Interventions:  - Encourage patient to monitor pain and request assistance  - Assess pain using appropriate pain scale  - Administer analgesics based on type and severity of pain and evaluate response  - Implement non-pharmacological measures as appropriate and evaluate response  - Consider cultural and social influences on pain and pain management  - Notify physician/advanced practitioner if interventions unsuccessful or patient reports new pain  Outcome: Progressing     Problem: INFECTION - ADULT  Goal: Absence or prevention of progression during hospitalization  Description: INTERVENTIONS:  - Assess and monitor for signs and symptoms of infection  - Monitor lab/diagnostic results  - Monitor all insertion sites, i.e. indwelling lines, tubes, and drains  - Monitor endotracheal if appropriate and nasal secretions for changes in amount and color  - [place] appropriate cooling/warming therapies per order  - Administer medications as ordered  - Instruct and encourage patient and family to use good hand hygiene technique  - Identify and instruct in appropriate isolation precautions for  identified infection/condition  Outcome: Progressing     Problem: SAFETY ADULT  Goal: Patient will remain free of falls  Description: INTERVENTIONS:  - Educate patient/family on patient safety including physical limitations  - Instruct patient to call for assistance with activity   - Consult OT/PT to assist with strengthening/mobility   - Keep Call bell within reach  - Keep bed low and locked with side rails adjusted as appropriate  - Keep care items and personal belongings within reach  - Initiate and maintain comfort rounds  - Make Fall Risk Sign visible to staff  - Obtain necessary fall risk management equipment: non-slip socks  - Apply yellow socks and bracelet for high fall risk patients  - Consider moving patient to room near nurses station  Outcome: Progressing  Goal: Maintain or return to baseline ADL function  Description: INTERVENTIONS:  - Educate patient/family on patient safety including physical limitations  - Instruct patient to call for assistance with activity   - Consult OT/PT to assist with strengthening/mobility   - Keep Call bell within reach  - Keep bed low and locked with side rails adjusted as appropriate  - Keep care items and personal belongings within reach  - Initiate and maintain comfort rounds  - Make Fall Risk Sign visible to staff  - Obtain necessary fall risk management equipment: non-slip socks  - Apply yellow socks and bracelet for high fall risk patients  - Consider moving patient to room near nurses station  Outcome: Progressing  Goal: Maintains/Returns to pre admission functional level  Description: INTERVENTIONS:  - Perform AM-PAC 6 Click Basic Mobility/ Daily Activity assessment daily.  - Set and communicate daily mobility goal to care team and patient/family/caregiver.   - Collaborate with rehabilitation services on mobility goals if consulted  - Perform Range of Motion 2 times a day.  - Reposition patient every 2 hours.  - Dangle patient 2 times a day  - Stand patient 2 times  a day  - Ambulate patient 2 times a day  - Out of bed to chair 2 times a day   - Out of bed for meals 2 times a day  - Out of bed for toileting  - Record patient progress and toleration of activity level   Outcome: Progressing     Problem: DISCHARGE PLANNING  Goal: Discharge to home or other facility with appropriate resources  Description: INTERVENTIONS:  - Identify barriers to discharge w/patient and caregiver  - Arrange for needed discharge resources and transportation as appropriate  - Identify discharge learning needs (meds, wound care, etc.)  - Arrange for interpretive services to assist at discharge as needed  - Refer to Case Management Department for coordinating discharge planning if the patient needs post-hospital services based on physician/advanced practitioner order or complex needs related to functional status, cognitive ability, or social support system  Outcome: Progressing     Problem: Knowledge Deficit  Goal: Patient/family/caregiver demonstrates understanding of disease process, treatment plan, medications, and discharge instructions  Description: Complete learning assessment and assess knowledge base.  Interventions:  - Provide teaching at level of understanding  - Provide teaching via preferred learning methods  Outcome: Progressing     Problem: CARDIOVASCULAR - ADULT  Goal: Maintains optimal cardiac output and hemodynamic stability  Description: INTERVENTIONS:  - Monitor I/O, vital signs and rhythm  - Monitor for S/S and trends of decreased cardiac output  - Administer and titrate ordered vasoactive medications to optimize hemodynamic stability  - Assess quality of pulses, skin color and temperature  - Assess for signs of decreased coronary artery perfusion  - Instruct patient to report change in severity of symptoms  Outcome: Progressing  Goal: Absence of cardiac dysrhythmias or at baseline rhythm  Description: INTERVENTIONS:  - Continuous cardiac monitoring, vital signs, obtain 12 lead EKG if  ordered  - Administer antiarrhythmic and heart rate control medications as ordered  - Monitor electrolytes and administer replacement therapy as ordered  Outcome: Progressing     Problem: RESPIRATORY - ADULT  Goal: Achieves optimal ventilation and oxygenation  Description: INTERVENTIONS:  - Assess for changes in respiratory status  - Assess for changes in mentation and behavior  - Position to facilitate oxygenation and minimize respiratory effort  - Oxygen administered by appropriate delivery if ordered  - Initiate smoking cessation education as indicated  - Encourage broncho-pulmonary hygiene including cough, deep breathe, Incentive Spirometry  - Assess the need for suctioning and aspirate as needed  - Assess and instruct to report SOB or any respiratory difficulty  - Respiratory Therapy support as indicated  Outcome: Progressing     Problem: METABOLIC, FLUID AND ELECTROLYTES - ADULT  Goal: Electrolytes maintained within normal limits  Description: INTERVENTIONS:  - Monitor labs and assess patient for signs and symptoms of electrolyte imbalances  - Administer electrolyte replacement as ordered  - Monitor response to electrolyte replacements, including repeat lab results as appropriate  - Instruct patient on fluid and nutrition as appropriate  Outcome: Progressing  Goal: Fluid balance maintained  Description: INTERVENTIONS:  - Monitor labs   - Monitor I/O and WT  - Instruct patient on fluid and nutrition as appropriate  - Assess for signs & symptoms of volume excess or deficit  Outcome: Progressing     Problem: Prexisting or High Potential for Compromised Skin Integrity  Goal: Skin integrity is maintained or improved  Description: INTERVENTIONS:  - Identify patients at risk for skin breakdown  - Assess and monitor skin integrity  - Assess and monitor nutrition and hydration status  - Monitor labs   - Assess for incontinence   - Turn and reposition patient  - Assist with mobility/ambulation  - Relieve pressure over  bony prominences  - Avoid friction and shearing  - Provide appropriate hygiene as needed including keeping skin clean and dry  - Evaluate need for skin moisturizer/barrier cream  - Collaborate with interdisciplinary team   - Patient/family teaching  - Consider wound care consult   Outcome: Progressing

## 2024-03-01 NOTE — ASSESSMENT & PLAN NOTE
Lab Results   Component Value Date    HGBA1C 8.5 (H) 01/22/2024       Recent Labs     02/29/24  1658 02/29/24  2127 03/01/24  0736 03/01/24  1147   POCGLU 151* 123 123 154*         Blood Sugar Average: Last 72 hrs:  (P) [id number]  On Lantus 55 units at bedtime and Apidra 25 units 3 times daily with meals.  Continue Lantus 85 units at bedtime, Humalog 25 units 3 times daily with meals and sliding scale  Diabetic diet  hypoGlycemia protocol

## 2024-03-02 LAB
ANION GAP SERPL CALCULATED.3IONS-SCNC: 6 MMOL/L
BUN SERPL-MCNC: 17 MG/DL (ref 5–25)
CALCIUM SERPL-MCNC: 8.9 MG/DL (ref 8.4–10.2)
CHLORIDE SERPL-SCNC: 93 MMOL/L (ref 96–108)
CO2 SERPL-SCNC: 38 MMOL/L (ref 21–32)
CREAT SERPL-MCNC: 0.66 MG/DL (ref 0.6–1.3)
ERYTHROCYTE [DISTWIDTH] IN BLOOD BY AUTOMATED COUNT: 19 % (ref 11.6–15.1)
GFR SERPL CREATININE-BSD FRML MDRD: 92 ML/MIN/1.73SQ M
GLUCOSE SERPL-MCNC: 117 MG/DL (ref 65–140)
GLUCOSE SERPL-MCNC: 120 MG/DL (ref 65–140)
GLUCOSE SERPL-MCNC: 184 MG/DL (ref 65–140)
GLUCOSE SERPL-MCNC: 88 MG/DL (ref 65–140)
GLUCOSE SERPL-MCNC: 90 MG/DL (ref 65–140)
HCT VFR BLD AUTO: 37.9 % (ref 34.8–46.1)
HGB BLD-MCNC: 10.7 G/DL (ref 11.5–15.4)
MCH RBC QN AUTO: 19.2 PG (ref 26.8–34.3)
MCHC RBC AUTO-ENTMCNC: 28.2 G/DL (ref 31.4–37.4)
MCV RBC AUTO: 68 FL (ref 82–98)
PLATELET # BLD AUTO: 335 THOUSANDS/UL (ref 149–390)
PMV BLD AUTO: 10.9 FL (ref 8.9–12.7)
POTASSIUM SERPL-SCNC: 3.7 MMOL/L (ref 3.5–5.3)
RBC # BLD AUTO: 5.58 MILLION/UL (ref 3.81–5.12)
SODIUM SERPL-SCNC: 137 MMOL/L (ref 135–147)
WBC # BLD AUTO: 11.25 THOUSAND/UL (ref 4.31–10.16)

## 2024-03-02 PROCEDURE — 94760 N-INVAS EAR/PLS OXIMETRY 1: CPT

## 2024-03-02 PROCEDURE — 94640 AIRWAY INHALATION TREATMENT: CPT

## 2024-03-02 PROCEDURE — 99232 SBSQ HOSP IP/OBS MODERATE 35: CPT | Performed by: INTERNAL MEDICINE

## 2024-03-02 PROCEDURE — 82948 REAGENT STRIP/BLOOD GLUCOSE: CPT

## 2024-03-02 PROCEDURE — 85027 COMPLETE CBC AUTOMATED: CPT | Performed by: INTERNAL MEDICINE

## 2024-03-02 PROCEDURE — 80048 BASIC METABOLIC PNL TOTAL CA: CPT | Performed by: INTERNAL MEDICINE

## 2024-03-02 PROCEDURE — 94660 CPAP INITIATION&MGMT: CPT

## 2024-03-02 RX ADMIN — MONTELUKAST 10 MG: 10 TABLET, FILM COATED ORAL at 21:09

## 2024-03-02 RX ADMIN — INSULIN LISPRO 25 UNITS: 100 INJECTION, SOLUTION INTRAVENOUS; SUBCUTANEOUS at 17:57

## 2024-03-02 RX ADMIN — SPIRONOLACTONE 100 MG: 25 TABLET ORAL at 08:58

## 2024-03-02 RX ADMIN — METOPROLOL SUCCINATE 100 MG: 50 TABLET, EXTENDED RELEASE ORAL at 08:58

## 2024-03-02 RX ADMIN — BUDESONIDE 0.5 MG: 0.5 INHALANT ORAL at 07:57

## 2024-03-02 RX ADMIN — ATORVASTATIN CALCIUM 40 MG: 40 TABLET, FILM COATED ORAL at 08:58

## 2024-03-02 RX ADMIN — METOPROLOL SUCCINATE 50 MG: 50 TABLET, EXTENDED RELEASE ORAL at 17:57

## 2024-03-02 RX ADMIN — FLUTICASONE PROPIONATE 1 SPRAY: 50 SPRAY, METERED NASAL at 21:11

## 2024-03-02 RX ADMIN — EMPAGLIFLOZIN 10 MG: 10 TABLET, FILM COATED ORAL at 08:58

## 2024-03-02 RX ADMIN — NYSTATIN: 100000 POWDER TOPICAL at 17:57

## 2024-03-02 RX ADMIN — LIDOCAINE 5% 1 PATCH: 700 PATCH TOPICAL at 08:59

## 2024-03-02 RX ADMIN — ACETAMINOPHEN 650 MG: 325 TABLET, FILM COATED ORAL at 20:17

## 2024-03-02 RX ADMIN — TRAZODONE HYDROCHLORIDE 150 MG: 150 TABLET ORAL at 21:09

## 2024-03-02 RX ADMIN — APIXABAN 5 MG: 5 TABLET, FILM COATED ORAL at 17:57

## 2024-03-02 RX ADMIN — ACETAMINOPHEN 650 MG: 325 TABLET, FILM COATED ORAL at 12:09

## 2024-03-02 RX ADMIN — DULOXETINE HYDROCHLORIDE 60 MG: 30 CAPSULE, DELAYED RELEASE ORAL at 08:58

## 2024-03-02 RX ADMIN — APIXABAN 5 MG: 5 TABLET, FILM COATED ORAL at 08:58

## 2024-03-02 RX ADMIN — NYSTATIN: 100000 POWDER TOPICAL at 08:58

## 2024-03-02 RX ADMIN — Medication 0.5 MG/HR: at 21:11

## 2024-03-02 RX ADMIN — PANTOPRAZOLE SODIUM 40 MG: 40 TABLET, DELAYED RELEASE ORAL at 05:02

## 2024-03-02 RX ADMIN — INSULIN GLARGINE 55 UNITS: 100 INJECTION, SOLUTION SUBCUTANEOUS at 21:10

## 2024-03-02 RX ADMIN — INSULIN LISPRO 25 UNITS: 100 INJECTION, SOLUTION INTRAVENOUS; SUBCUTANEOUS at 09:00

## 2024-03-02 RX ADMIN — FLUTICASONE PROPIONATE 1 SPRAY: 50 SPRAY, METERED NASAL at 08:58

## 2024-03-02 RX ADMIN — BUDESONIDE 0.5 MG: 0.5 INHALANT ORAL at 21:26

## 2024-03-02 RX ADMIN — INSULIN LISPRO 2 UNITS: 100 INJECTION, SOLUTION INTRAVENOUS; SUBCUTANEOUS at 12:38

## 2024-03-02 RX ADMIN — INSULIN LISPRO 25 UNITS: 100 INJECTION, SOLUTION INTRAVENOUS; SUBCUTANEOUS at 12:38

## 2024-03-02 NOTE — PLAN OF CARE
Problem: Potential for Falls  Goal: Patient will remain free of falls  Description: INTERVENTIONS:  - Educate patient/family on patient safety including physical limitations  - Instruct patient to call for assistance with activity   - Consult OT/PT to assist with strengthening/mobility   - Keep Call bell within reach  - Keep bed low and locked with side rails adjusted as appropriate  - Keep care items and personal belongings within reach  - Initiate and maintain comfort rounds  - Make Fall Risk Sign visible to staff  - Obtain necessary fall risk management equipment: non-slip socks  - Apply yellow socks and bracelet for high fall risk patients  - Consider moving patient to room near nurses station  Outcome: Progressing     Problem: PAIN - ADULT  Goal: Verbalizes/displays adequate comfort level or baseline comfort level  Description: Interventions:  - Encourage patient to monitor pain and request assistance  - Assess pain using appropriate pain scale  - Administer analgesics based on type and severity of pain and evaluate response  - Implement non-pharmacological measures as appropriate and evaluate response  - Consider cultural and social influences on pain and pain management  - Notify physician/advanced practitioner if interventions unsuccessful or patient reports new pain  Outcome: Progressing     Problem: INFECTION - ADULT  Goal: Absence or prevention of progression during hospitalization  Description: INTERVENTIONS:  - Assess and monitor for signs and symptoms of infection  - Monitor lab/diagnostic results  - Monitor all insertion sites, i.e. indwelling lines, tubes, and drains  - Monitor endotracheal if appropriate and nasal secretions for changes in amount and color  - [place] appropriate cooling/warming therapies per order  - Administer medications as ordered  - Instruct and encourage patient and family to use good hand hygiene technique  - Identify and instruct in appropriate isolation precautions for  identified infection/condition  Outcome: Progressing     Problem: SAFETY ADULT  Goal: Patient will remain free of falls  Description: INTERVENTIONS:  - Educate patient/family on patient safety including physical limitations  - Instruct patient to call for assistance with activity   - Consult OT/PT to assist with strengthening/mobility   - Keep Call bell within reach  - Keep bed low and locked with side rails adjusted as appropriate  - Keep care items and personal belongings within reach  - Initiate and maintain comfort rounds  - Make Fall Risk Sign visible to staff  - Obtain necessary fall risk management equipment: non-slip socks  - Apply yellow socks and bracelet for high fall risk patients  - Consider moving patient to room near nurses station  Outcome: Progressing  Goal: Maintain or return to baseline ADL function  Description: INTERVENTIONS:  - Educate patient/family on patient safety including physical limitations  - Instruct patient to call for assistance with activity   - Consult OT/PT to assist with strengthening/mobility   - Keep Call bell within reach  - Keep bed low and locked with side rails adjusted as appropriate  - Keep care items and personal belongings within reach  - Initiate and maintain comfort rounds  - Make Fall Risk Sign visible to staff  - Obtain necessary fall risk management equipment: non-slip socks  - Apply yellow socks and bracelet for high fall risk patients  - Consider moving patient to room near nurses station  Outcome: Progressing  Goal: Maintains/Returns to pre admission functional level  Description: INTERVENTIONS:  - Perform AM-PAC 6 Click Basic Mobility/ Daily Activity assessment daily.  - Set and communicate daily mobility goal to care team and patient/family/caregiver.   - Collaborate with rehabilitation services on mobility goals if consulted  - Perform Range of Motion 2 times a day.  - Reposition patient every 2 hours.  - Dangle patient 2 times a day  - Stand patient 2 times  a day  - Ambulate patient 2 times a day  - Out of bed to chair 2 times a day   - Out of bed for meals 2 times a day  - Out of bed for toileting  - Record patient progress and toleration of activity level   Outcome: Progressing     Problem: DISCHARGE PLANNING  Goal: Discharge to home or other facility with appropriate resources  Description: INTERVENTIONS:  - Identify barriers to discharge w/patient and caregiver  - Arrange for needed discharge resources and transportation as appropriate  - Identify discharge learning needs (meds, wound care, etc.)  - Arrange for interpretive services to assist at discharge as needed  - Refer to Case Management Department for coordinating discharge planning if the patient needs post-hospital services based on physician/advanced practitioner order or complex needs related to functional status, cognitive ability, or social support system  Outcome: Progressing     Problem: Knowledge Deficit  Goal: Patient/family/caregiver demonstrates understanding of disease process, treatment plan, medications, and discharge instructions  Description: Complete learning assessment and assess knowledge base.  Interventions:  - Provide teaching at level of understanding  - Provide teaching via preferred learning methods  Outcome: Progressing     Problem: CARDIOVASCULAR - ADULT  Goal: Maintains optimal cardiac output and hemodynamic stability  Description: INTERVENTIONS:  - Monitor I/O, vital signs and rhythm  - Monitor for S/S and trends of decreased cardiac output  - Administer and titrate ordered vasoactive medications to optimize hemodynamic stability  - Assess quality of pulses, skin color and temperature  - Assess for signs of decreased coronary artery perfusion  - Instruct patient to report change in severity of symptoms  Outcome: Progressing  Goal: Absence of cardiac dysrhythmias or at baseline rhythm  Description: INTERVENTIONS:  - Continuous cardiac monitoring, vital signs, obtain 12 lead EKG if  ordered  - Administer antiarrhythmic and heart rate control medications as ordered  - Monitor electrolytes and administer replacement therapy as ordered  Outcome: Progressing     Problem: RESPIRATORY - ADULT  Goal: Achieves optimal ventilation and oxygenation  Description: INTERVENTIONS:  - Assess for changes in respiratory status  - Assess for changes in mentation and behavior  - Position to facilitate oxygenation and minimize respiratory effort  - Oxygen administered by appropriate delivery if ordered  - Initiate smoking cessation education as indicated  - Encourage broncho-pulmonary hygiene including cough, deep breathe, Incentive Spirometry  - Assess the need for suctioning and aspirate as needed  - Assess and instruct to report SOB or any respiratory difficulty  - Respiratory Therapy support as indicated  Outcome: Progressing     Problem: METABOLIC, FLUID AND ELECTROLYTES - ADULT  Goal: Electrolytes maintained within normal limits  Description: INTERVENTIONS:  - Monitor labs and assess patient for signs and symptoms of electrolyte imbalances  - Administer electrolyte replacement as ordered  - Monitor response to electrolyte replacements, including repeat lab results as appropriate  - Instruct patient on fluid and nutrition as appropriate  Outcome: Progressing  Goal: Fluid balance maintained  Description: INTERVENTIONS:  - Monitor labs   - Monitor I/O and WT  - Instruct patient on fluid and nutrition as appropriate  - Assess for signs & symptoms of volume excess or deficit  Outcome: Progressing     Problem: Prexisting or High Potential for Compromised Skin Integrity  Goal: Skin integrity is maintained or improved  Description: INTERVENTIONS:  - Identify patients at risk for skin breakdown  - Assess and monitor skin integrity  - Assess and monitor nutrition and hydration status  - Monitor labs   - Assess for incontinence   - Turn and reposition patient  - Assist with mobility/ambulation  - Relieve pressure over  bony prominences  - Avoid friction and shearing  - Provide appropriate hygiene as needed including keeping skin clean and dry  - Evaluate need for skin moisturizer/barrier cream  - Collaborate with interdisciplinary team   - Patient/family teaching  - Consider wound care consult   Outcome: Progressing

## 2024-03-02 NOTE — ASSESSMENT & PLAN NOTE
On metoprolol 100 mg daily and Eliquis 5 twice daily  Intermittently uncontrolled    metoprolol 50 mg pm was added on 3/1  If HR still high, will increase to 100 mg bid

## 2024-03-02 NOTE — ASSESSMENT & PLAN NOTE
Lab Results   Component Value Date    HGBA1C 8.5 (H) 01/22/2024       Recent Labs     03/01/24  1147 03/01/24  1706 03/01/24 2059 03/02/24  0700   POCGLU 154* 89 123 117         Blood Sugar Average: Last 72 hrs:  (P) [id number]  On Lantus 55 units at bedtime and Apidra 25 units 3 times daily with meals.  Continue Lantus 85 units at bedtime, Humalog 25 units 3 times daily with meals and sliding scale  Diabetic diet  hypoGlycemia protocol

## 2024-03-02 NOTE — PLAN OF CARE
Problem: Potential for Falls  Goal: Patient will remain free of falls  Description: INTERVENTIONS:  - Educate patient/family on patient safety including physical limitations  - Instruct patient to call for assistance with activity   - Consult OT/PT to assist with strengthening/mobility   - Keep Call bell within reach  - Keep bed low and locked with side rails adjusted as appropriate  - Keep care items and personal belongings within reach  - Initiate and maintain comfort rounds  - Make Fall Risk Sign visible to staff  - Obtain necessary fall risk management equipment: non-slip socks  - Apply yellow socks and bracelet for high fall risk patients  - Consider moving patient to room near nurses station  Outcome: Progressing     Problem: PAIN - ADULT  Goal: Verbalizes/displays adequate comfort level or baseline comfort level  Description: Interventions:  - Encourage patient to monitor pain and request assistance  - Assess pain using appropriate pain scale  - Administer analgesics based on type and severity of pain and evaluate response  - Implement non-pharmacological measures as appropriate and evaluate response  - Consider cultural and social influences on pain and pain management  - Notify physician/advanced practitioner if interventions unsuccessful or patient reports new pain  Outcome: Progressing     Problem: INFECTION - ADULT  Goal: Absence or prevention of progression during hospitalization  Description: INTERVENTIONS:  - Assess and monitor for signs and symptoms of infection  - Monitor lab/diagnostic results  - Monitor all insertion sites, i.e. indwelling lines, tubes, and drains  - Monitor endotracheal if appropriate and nasal secretions for changes in amount and color  - [place] appropriate cooling/warming therapies per order  - Administer medications as ordered  - Instruct and encourage patient and family to use good hand hygiene technique  - Identify and instruct in appropriate isolation precautions for  identified infection/condition  Outcome: Progressing     Problem: SAFETY ADULT  Goal: Patient will remain free of falls  Description: INTERVENTIONS:  - Educate patient/family on patient safety including physical limitations  - Instruct patient to call for assistance with activity   - Consult OT/PT to assist with strengthening/mobility   - Keep Call bell within reach  - Keep bed low and locked with side rails adjusted as appropriate  - Keep care items and personal belongings within reach  - Initiate and maintain comfort rounds  - Make Fall Risk Sign visible to staff  - Obtain necessary fall risk management equipment: non-slip socks  - Apply yellow socks and bracelet for high fall risk patients  - Consider moving patient to room near nurses station  Outcome: Progressing  Goal: Maintain or return to baseline ADL function  Description: INTERVENTIONS:  - Educate patient/family on patient safety including physical limitations  - Instruct patient to call for assistance with activity   - Consult OT/PT to assist with strengthening/mobility   - Keep Call bell within reach  - Keep bed low and locked with side rails adjusted as appropriate  - Keep care items and personal belongings within reach  - Initiate and maintain comfort rounds  - Make Fall Risk Sign visible to staff  - Obtain necessary fall risk management equipment: non-slip socks  - Apply yellow socks and bracelet for high fall risk patients  - Consider moving patient to room near nurses station  Outcome: Progressing  Goal: Maintains/Returns to pre admission functional level  Description: INTERVENTIONS:  - Perform AM-PAC 6 Click Basic Mobility/ Daily Activity assessment daily.  - Set and communicate daily mobility goal to care team and patient/family/caregiver.   - Collaborate with rehabilitation services on mobility goals if consulted  - Perform Range of Motion 2 times a day.  - Reposition patient every 2 hours.  - Dangle patient 2 times a day  - Stand patient 2 times  a day  - Ambulate patient 2 times a day  - Out of bed to chair 2 times a day   - Out of bed for meals 2 times a day  - Out of bed for toileting  - Record patient progress and toleration of activity level   Outcome: Progressing     Problem: DISCHARGE PLANNING  Goal: Discharge to home or other facility with appropriate resources  Description: INTERVENTIONS:  - Identify barriers to discharge w/patient and caregiver  - Arrange for needed discharge resources and transportation as appropriate  - Identify discharge learning needs (meds, wound care, etc.)  - Arrange for interpretive services to assist at discharge as needed  - Refer to Case Management Department for coordinating discharge planning if the patient needs post-hospital services based on physician/advanced practitioner order or complex needs related to functional status, cognitive ability, or social support system  Outcome: Progressing     Problem: Knowledge Deficit  Goal: Patient/family/caregiver demonstrates understanding of disease process, treatment plan, medications, and discharge instructions  Description: Complete learning assessment and assess knowledge base.  Interventions:  - Provide teaching at level of understanding  - Provide teaching via preferred learning methods  Outcome: Progressing     Problem: CARDIOVASCULAR - ADULT  Goal: Maintains optimal cardiac output and hemodynamic stability  Description: INTERVENTIONS:  - Monitor I/O, vital signs and rhythm  - Monitor for S/S and trends of decreased cardiac output  - Administer and titrate ordered vasoactive medications to optimize hemodynamic stability  - Assess quality of pulses, skin color and temperature  - Assess for signs of decreased coronary artery perfusion  - Instruct patient to report change in severity of symptoms  Outcome: Progressing  Goal: Absence of cardiac dysrhythmias or at baseline rhythm  Description: INTERVENTIONS:  - Continuous cardiac monitoring, vital signs, obtain 12 lead EKG if  ordered  - Administer antiarrhythmic and heart rate control medications as ordered  - Monitor electrolytes and administer replacement therapy as ordered  Outcome: Progressing     Problem: RESPIRATORY - ADULT  Goal: Achieves optimal ventilation and oxygenation  Description: INTERVENTIONS:  - Assess for changes in respiratory status  - Assess for changes in mentation and behavior  - Position to facilitate oxygenation and minimize respiratory effort  - Oxygen administered by appropriate delivery if ordered  - Initiate smoking cessation education as indicated  - Encourage broncho-pulmonary hygiene including cough, deep breathe, Incentive Spirometry  - Assess the need for suctioning and aspirate as needed  - Assess and instruct to report SOB or any respiratory difficulty  - Respiratory Therapy support as indicated  Outcome: Progressing     Problem: METABOLIC, FLUID AND ELECTROLYTES - ADULT  Goal: Electrolytes maintained within normal limits  Description: INTERVENTIONS:  - Monitor labs and assess patient for signs and symptoms of electrolyte imbalances  - Administer electrolyte replacement as ordered  - Monitor response to electrolyte replacements, including repeat lab results as appropriate  - Instruct patient on fluid and nutrition as appropriate  Outcome: Progressing  Goal: Fluid balance maintained  Description: INTERVENTIONS:  - Monitor labs   - Monitor I/O and WT  - Instruct patient on fluid and nutrition as appropriate  - Assess for signs & symptoms of volume excess or deficit  Outcome: Progressing     Problem: Prexisting or High Potential for Compromised Skin Integrity  Goal: Skin integrity is maintained or improved  Description: INTERVENTIONS:  - Identify patients at risk for skin breakdown  - Assess and monitor skin integrity  - Assess and monitor nutrition and hydration status  - Monitor labs   - Assess for incontinence   - Turn and reposition patient  - Assist with mobility/ambulation  - Relieve pressure over  bony prominences  - Avoid friction and shearing  - Provide appropriate hygiene as needed including keeping skin clean and dry  - Evaluate need for skin moisturizer/barrier cream  - Collaborate with interdisciplinary team   - Patient/family teaching  - Consider wound care consult   Outcome: Progressing

## 2024-03-02 NOTE — ASSESSMENT & PLAN NOTE
Wt Readings from Last 3 Encounters:   03/02/24 122 kg (268 lb 1.6 oz)   02/26/24 127 kg (279 lb)   02/26/24 127 kg (279 lb)     Seen in the cardiology office 2/26  Approximately 15 pounds weight gain in the past 2 weeks.  Noncompliant with salt diet  On Bumex 6 mg twice daily recently increased   patient obese  Chest x-ray with congestion  Last echo August 2023 showed LVEF 65% diastolic dysfunction 2, R VSP 61 mmHg  Started on Bumex drip, had 4.5 L urine output/24 hours , negative  20 L since admission   Noncompliant with fluids restriction, drinks water from the sink, per nurse     Plan:   Continue Bumex drip  Strict intake output  Daily weights  Low-salt diet  Continue metoprolol and Jardiance  continue Aldactone 100 mg

## 2024-03-02 NOTE — PROGRESS NOTES
[facility]  Progress Note  Name: Mattie Joy I  MRN: [id number]  Unit/Bed#: -01 I Date of Admission: 2/26/2024   Date of Service: 3/2/2024 I Hospital Day: 5    Assessment/Plan   * Acute on chronic respiratory failure (HCC)  Assessment & Plan  At baseline on 4 L nasal cannula oxygen secondary to severe COPD, pulmonary hypertension  Required up to 7 L nasal cannula oxygen on presentation  Likely decompensated secondary to CHF  Flu/COVID/RSV negative  Monitor with diuresis    Currently saturating fine at 4 L nasal cannula oxygen which is her baseline    Acute on chronic diastolic CHF (congestive heart failure) (HCC)  Assessment & Plan  Wt Readings from Last 3 Encounters:   03/02/24 122 kg (268 lb 1.6 oz)   02/26/24 127 kg (279 lb)   02/26/24 127 kg (279 lb)     Seen in the cardiology office 2/26  Approximately 15 pounds weight gain in the past 2 weeks.  Noncompliant with salt diet  On Bumex 6 mg twice daily recently increased   patient obese  Chest x-ray with congestion  Last echo August 2023 showed LVEF 65% diastolic dysfunction 2, R VSP 61 mmHg  Started on Bumex drip, had 4.5 L urine output/24 hours , negative  20 L since admission   Noncompliant with fluids restriction, drinks water from the sink, per nurse     Plan:   Continue Bumex drip  Strict intake output  Daily weights  Low-salt diet  Continue metoprolol and Jardiance  continue Aldactone 100 mg                 COPD, severe (HCC)  Assessment & Plan  Followed with pulmonology.  On albuterol nebulizers as needed, Pulmicort, Claritin, Singulair, bevespi   Noncompliant with medication.  Continue Pulmicort/albuterol, Claritin, Singulair    Not in COPD exacerbation currently    Morbid obesity (HCC)  Assessment & Plan  Lifestyle changes encouraged     Current moderate episode of major depressive disorder without prior episode ([facility])  Assessment & Plan  On Cymbalta, trazodone and Lexapro.  Reported Lexapro was  discontinued  Continue Cymbalta and trazodone    Paroxysmal atrial fibrillation (HCC)  Assessment & Plan  On metoprolol 100 mg daily and Eliquis 5 twice daily  Intermittently uncontrolled    metoprolol 50 mg pm was added on 3/1  If HR still high, will increase to 100 mg bid       Type 2 diabetes mellitus with stage 2 chronic kidney disease, with long-term current use of insulin (HCC)  Assessment & Plan  Lab Results   Component Value Date    HGBA1C 8.5 (H) 01/22/2024       Recent Labs     03/01/24  1147 03/01/24  1706 03/01/24  2059 03/02/24  0700   POCGLU 154* 89 123 117         Blood Sugar Average: Last 72 hrs:  (P) [id number]  On Lantus 55 units at bedtime and Apidra 25 units 3 times daily with meals.  Continue Lantus 85 units at bedtime, Humalog 25 units 3 times daily with meals and sliding scale  Diabetic diet  hypoGlycemia protocol    Constipation-resolved as of 2/29/2024  Assessment & Plan  Started bowel regimen           VTE Pharmacologic Prophylaxis: VTE Score: 6 High Risk (Score >/= 5) - Pharmacological DVT Prophylaxis Ordered: apixaban (Eliquis). Sequential Compression Devices Ordered.    Mobility:   Basic Mobility Inpatient Raw Score: 21  JH-HLM Goal: 6: Walk 10 steps or more  JH-HLM Achieved: 7: Walk 25 feet or more  HLM Goal achieved. Continue to encourage appropriate mobility.    Patient Centered Rounds: I performed bedside rounds with nursing staff today.   Discussions with Specialists or Other Care Team Provider:     Education and Discussions with Family / Patient: Patient declined call to .     Total Time Spent on Date of Encounter in care of patient: 35 mins. This time was spent on one or more of the following: performing physical exam; counseling and coordination of care; obtaining or reviewing history; documenting in the medical record; reviewing/ordering tests, medications or procedures; communicating with other healthcare professionals and discussing with patient's  family/caregivers.    Current Length of Stay: 5 day(s)  Current Patient Status: Inpatient   Certification Statement: The patient will continue to require additional inpatient hospital stay due to acute/ch chf  Discharge Plan: Anticipate discharge in >72 hrs to home.    Code Status: Level 1 - Full Code    Subjective:     No symptoms since     Objective:     Vitals:   Temp (24hrs), Av.8 °F (36.6 °C), Min:97.2 °F (36.2 °C), Max:98.2 °F (36.8 °C)    Temp:  [97.2 °F (36.2 °C)-98.2 °F (36.8 °C)] 97.2 °F (36.2 °C)  HR:  [] 98  Resp:  [14-18] 18  BP: (104-121)/(52-85) 108/58  SpO2:  [94 %-99 %] 94 %  Body mass index is 47.49 kg/m².     Input and Output Summary (last 24 hours):     Intake/Output Summary (Last 24 hours) at 3/2/2024 0900  Last data filed at 3/2/2024 0758  Gross per 24 hour   Intake 1242 ml   Output 5350 ml   Net -4108 ml       Physical Exam:   Physical Exam  Vitals and nursing note reviewed.   Constitutional:       General: She is not in acute distress.     Appearance: She is obese. She is not diaphoretic.   HENT:      Head: Normocephalic.   Eyes:      General: No scleral icterus.        Right eye: No discharge.         Left eye: No discharge.   Cardiovascular:      Rate and Rhythm: Normal rate. Rhythm irregular.   Pulmonary:      Effort: Pulmonary effort is normal. No respiratory distress.      Breath sounds: Normal breath sounds. No wheezing, rhonchi or rales.   Abdominal:      General: There is no distension.      Palpations: Abdomen is soft.      Tenderness: There is no abdominal tenderness. There is no guarding or rebound.   Musculoskeletal:      Cervical back: Normal range of motion.      Right lower leg: Edema present.      Left lower leg: Edema present.   Skin:     General: Skin is warm.      Coloration: Skin is pale.   Neurological:      Mental Status: She is alert and oriented to person, place, and time.   Psychiatric:         Mood and Affect: Mood normal.         Behavior: Behavior normal.          Thought Content: Thought content normal.         Judgment: Judgment normal.          Additional Data:     Labs:  Results from last 7 days   Lab Units 03/02/24  0504 02/28/24  0229 02/27/24  0441   WBC Thousand/uL 11.25*   < > 11.04*   HEMOGLOBIN g/dL 10.7*   < > 9.3*   HEMATOCRIT % 37.9   < > 33.3*   PLATELETS Thousands/uL 335   < > 263   NEUTROS PCT %  --   --  71   LYMPHS PCT %  --   --  19   MONOS PCT %  --   --  9   EOS PCT %  --   --  0    < > = values in this interval not displayed.     Results from last 7 days   Lab Units 03/02/24  0504 02/26/24  1423 02/26/24  1408   SODIUM mmol/L 137   < > 139   POTASSIUM mmol/L 3.7   < > 3.6   CHLORIDE mmol/L 93*   < > 94*   CO2 mmol/L 38*   < > 37*   CO2, I-STAT   --    < >  --    BUN mg/dL 17   < > 16   CREATININE mg/dL 0.66   < > 0.72   ANION GAP mmol/L 6   < > 8   CALCIUM mg/dL 8.9   < > 9.3   ALBUMIN g/dL  --   --  4.1   TOTAL BILIRUBIN mg/dL  --   --  0.39   ALK PHOS U/L  --   --  147*   ALT U/L  --   --  26   AST U/L  --   --  20   GLUCOSE RANDOM mg/dL 90   < > 83    < > = values in this interval not displayed.     Results from last 7 days   Lab Units 02/26/24  1408   INR  1.33*     Results from last 7 days   Lab Units 03/02/24  0700 03/01/24  2059 03/01/24  1706 03/01/24  1147 03/01/24  0736 02/29/24  2127 02/29/24  1658 02/29/24  1111 02/29/24  0714 02/28/24  2041 02/28/24  1650 02/28/24  1106   POC GLUCOSE mg/dl 117 123 89 154* 123 123 151* 195* 149* 225* 138 157*         Results from last 7 days   Lab Units 02/26/24  1648 02/26/24  1408   LACTIC ACID mmol/L 0.8 2.3*   PROCALCITONIN ng/ml  --  0.06       Lines/Drains:  Invasive Devices       Peripheral Intravenous Line  Duration             Peripheral IV 03/01/24 Left;Ventral (anterior) Forearm <1 day                      Telemetry:  Telemetry Orders (From admission, onward)               24 Hour Telemetry Monitoring  Continuous x 24 Hours (Telem)        Expiring   Question:  Reason for 24 Hour Telemetry   Answer:  Decompensated CHF- and any one of the following: continuous diuretic infusion or total diuretic dose >200 mg daily, associated electrolyte derangement (I.e. K < 3.0), ionotropic drip (continuous infusion), hx of ventricular arrhythmia, or new EF < 35%                     Telemetry Reviewed: Atrial fibrillation. HR averaging 115  Indication for Continued Telemetry Use: Acute CHF on >200 mg lasix/day or equivalent dose or with new reduced EF.              Imaging: No pertinent imaging reviewed.    Recent Cultures (last 7 days):   Results from last 7 days   Lab Units 02/26/24  2120 02/26/24  1419 02/26/24  1408   BLOOD CULTURE   --  No Growth After 4 Days. No Growth After 4 Days.   URINE CULTURE  40,000-49,000 cfu/ml  --   --        Last 24 Hours Medication List:   Current Facility-Administered Medications   Medication Dose Route Frequency Provider Last Rate    acetaminophen  650 mg Oral Q6H PRN Rosamaria Miller PA-C      albuterol  2 puff Inhalation Q4H PRN Magdalena Fleming MD      albuterol  2.5 mg Nebulization Q6H PRN Magdalena Fleming MD      apixaban  5 mg Oral BID Magdalena Fleming MD      atorvastatin  40 mg Oral Daily Magdalena Fleming MD      budesonide  0.5 mg Nebulization Q12H Magdalena Fleming MD      bumetanide (BUMEX) 12.5 mg infusion 50 mL  0.5 mg/hr Intravenous Continuous Magdalena Fleming MD 0.5 mg/hr (03/01/24 2109)    DULoxetine  60 mg Oral Daily Magdalena Fleming MD      Empagliflozin  10 mg Oral Daily Magdalena Fleming MD      fluticasone  1 spray Nasal BID Magdalena Fleming MD      insulin glargine  55 Units Subcutaneous HS Magdalena Fleming MD      insulin lispro  1-5 Units Subcutaneous HS Magdalena Fleming MD      insulin lispro  2-12 Units Subcutaneous TID AC Magdalena Fleming MD      insulin lispro  25 Units Subcutaneous TID With Meals Magdalena Fleming MD      lidocaine  1 patch Topical Daily Magdalena Fleming MD      loratadine  10 mg Oral Daily  PRN Magdalena Fleming MD      LORazepam  0.5 mg Oral Daily PRN Magdalena Fleming MD      metoprolol succinate  100 mg Oral QAM Fran John PA-C      metoprolol succinate  50 mg Oral QPM Fran John PA-C      montelukast  10 mg Oral HS Magdalena Fleming MD      nystatin   Topical BID Magdalena Fleming MD      pantoprazole  40 mg Oral Early Morning Magdalena Fleming MD      spironolactone  100 mg Oral Daily Magdalena Fleming MD      traZODone  150 mg Oral HS Magdalena Fleming MD          Today, Patient Was Seen By: Magdalena Fleming MD    **Please Note: This note may have been constructed using a voice recognition system.**

## 2024-03-03 LAB
ANION GAP SERPL CALCULATED.3IONS-SCNC: 8 MMOL/L
BACTERIA BLD CULT: NORMAL
BACTERIA BLD CULT: NORMAL
BUN SERPL-MCNC: 22 MG/DL (ref 5–25)
CALCIUM SERPL-MCNC: 8.9 MG/DL (ref 8.4–10.2)
CHLORIDE SERPL-SCNC: 92 MMOL/L (ref 96–108)
CO2 SERPL-SCNC: 36 MMOL/L (ref 21–32)
CREAT SERPL-MCNC: 0.7 MG/DL (ref 0.6–1.3)
ERYTHROCYTE [DISTWIDTH] IN BLOOD BY AUTOMATED COUNT: 19.2 % (ref 11.6–15.1)
GFR SERPL CREATININE-BSD FRML MDRD: 91 ML/MIN/1.73SQ M
GLUCOSE SERPL-MCNC: 104 MG/DL (ref 65–140)
GLUCOSE SERPL-MCNC: 112 MG/DL (ref 65–140)
GLUCOSE SERPL-MCNC: 154 MG/DL (ref 65–140)
GLUCOSE SERPL-MCNC: 191 MG/DL (ref 65–140)
GLUCOSE SERPL-MCNC: 62 MG/DL (ref 65–140)
HCT VFR BLD AUTO: 38.2 % (ref 34.8–46.1)
HGB BLD-MCNC: 10.5 G/DL (ref 11.5–15.4)
MCH RBC QN AUTO: 18.7 PG (ref 26.8–34.3)
MCHC RBC AUTO-ENTMCNC: 27.5 G/DL (ref 31.4–37.4)
MCV RBC AUTO: 68 FL (ref 82–98)
PLATELET # BLD AUTO: 337 THOUSANDS/UL (ref 149–390)
PMV BLD AUTO: 11 FL (ref 8.9–12.7)
POTASSIUM SERPL-SCNC: 3.5 MMOL/L (ref 3.5–5.3)
RBC # BLD AUTO: 5.63 MILLION/UL (ref 3.81–5.12)
SODIUM SERPL-SCNC: 136 MMOL/L (ref 135–147)
WBC # BLD AUTO: 14.47 THOUSAND/UL (ref 4.31–10.16)

## 2024-03-03 PROCEDURE — 82948 REAGENT STRIP/BLOOD GLUCOSE: CPT

## 2024-03-03 PROCEDURE — 94760 N-INVAS EAR/PLS OXIMETRY 1: CPT

## 2024-03-03 PROCEDURE — 80048 BASIC METABOLIC PNL TOTAL CA: CPT | Performed by: INTERNAL MEDICINE

## 2024-03-03 PROCEDURE — 94660 CPAP INITIATION&MGMT: CPT

## 2024-03-03 PROCEDURE — 85027 COMPLETE CBC AUTOMATED: CPT | Performed by: INTERNAL MEDICINE

## 2024-03-03 PROCEDURE — 99232 SBSQ HOSP IP/OBS MODERATE 35: CPT | Performed by: INTERNAL MEDICINE

## 2024-03-03 PROCEDURE — 94640 AIRWAY INHALATION TREATMENT: CPT

## 2024-03-03 RX ORDER — INSULIN GLARGINE 100 [IU]/ML
45 INJECTION, SOLUTION SUBCUTANEOUS
Status: DISCONTINUED | OUTPATIENT
Start: 2024-03-03 | End: 2024-03-06 | Stop reason: HOSPADM

## 2024-03-03 RX ORDER — INSULIN LISPRO 100 [IU]/ML
15 INJECTION, SOLUTION INTRAVENOUS; SUBCUTANEOUS
Status: DISCONTINUED | OUTPATIENT
Start: 2024-03-03 | End: 2024-03-06 | Stop reason: HOSPADM

## 2024-03-03 RX ORDER — POTASSIUM CHLORIDE 20 MEQ/1
40 TABLET, EXTENDED RELEASE ORAL ONCE
Status: COMPLETED | OUTPATIENT
Start: 2024-03-03 | End: 2024-03-03

## 2024-03-03 RX ADMIN — INSULIN LISPRO 2 UNITS: 100 INJECTION, SOLUTION INTRAVENOUS; SUBCUTANEOUS at 17:57

## 2024-03-03 RX ADMIN — EMPAGLIFLOZIN 10 MG: 10 TABLET, FILM COATED ORAL at 08:11

## 2024-03-03 RX ADMIN — BUDESONIDE 0.5 MG: 0.5 INHALANT ORAL at 07:33

## 2024-03-03 RX ADMIN — METOPROLOL SUCCINATE 50 MG: 50 TABLET, EXTENDED RELEASE ORAL at 17:58

## 2024-03-03 RX ADMIN — ATORVASTATIN CALCIUM 40 MG: 40 TABLET, FILM COATED ORAL at 08:06

## 2024-03-03 RX ADMIN — INSULIN GLARGINE 45 UNITS: 100 INJECTION, SOLUTION SUBCUTANEOUS at 21:49

## 2024-03-03 RX ADMIN — APIXABAN 5 MG: 5 TABLET, FILM COATED ORAL at 17:58

## 2024-03-03 RX ADMIN — INSULIN LISPRO 15 UNITS: 100 INJECTION, SOLUTION INTRAVENOUS; SUBCUTANEOUS at 17:57

## 2024-03-03 RX ADMIN — ACETAMINOPHEN 650 MG: 325 TABLET, FILM COATED ORAL at 08:06

## 2024-03-03 RX ADMIN — METOPROLOL SUCCINATE 100 MG: 50 TABLET, EXTENDED RELEASE ORAL at 08:06

## 2024-03-03 RX ADMIN — INSULIN LISPRO 2 UNITS: 100 INJECTION, SOLUTION INTRAVENOUS; SUBCUTANEOUS at 12:39

## 2024-03-03 RX ADMIN — Medication 0.5 MG/HR: at 18:41

## 2024-03-03 RX ADMIN — SPIRONOLACTONE 100 MG: 25 TABLET ORAL at 08:06

## 2024-03-03 RX ADMIN — DULOXETINE HYDROCHLORIDE 60 MG: 30 CAPSULE, DELAYED RELEASE ORAL at 08:06

## 2024-03-03 RX ADMIN — NYSTATIN: 100000 POWDER TOPICAL at 08:10

## 2024-03-03 RX ADMIN — TRAZODONE HYDROCHLORIDE 150 MG: 150 TABLET ORAL at 21:49

## 2024-03-03 RX ADMIN — MONTELUKAST 10 MG: 10 TABLET, FILM COATED ORAL at 21:49

## 2024-03-03 RX ADMIN — PANTOPRAZOLE SODIUM 40 MG: 40 TABLET, DELAYED RELEASE ORAL at 06:29

## 2024-03-03 RX ADMIN — NYSTATIN: 100000 POWDER TOPICAL at 17:58

## 2024-03-03 RX ADMIN — ACETAMINOPHEN 650 MG: 325 TABLET, FILM COATED ORAL at 20:13

## 2024-03-03 RX ADMIN — FLUTICASONE PROPIONATE 1 SPRAY: 50 SPRAY, METERED NASAL at 21:49

## 2024-03-03 RX ADMIN — FLUTICASONE PROPIONATE 1 SPRAY: 50 SPRAY, METERED NASAL at 08:10

## 2024-03-03 RX ADMIN — INSULIN LISPRO 15 UNITS: 100 INJECTION, SOLUTION INTRAVENOUS; SUBCUTANEOUS at 12:38

## 2024-03-03 RX ADMIN — POTASSIUM CHLORIDE 40 MEQ: 1500 TABLET, EXTENDED RELEASE ORAL at 08:06

## 2024-03-03 RX ADMIN — BUDESONIDE 0.5 MG: 0.5 INHALANT ORAL at 20:15

## 2024-03-03 RX ADMIN — LIDOCAINE 5% 1 PATCH: 700 PATCH TOPICAL at 08:06

## 2024-03-03 RX ADMIN — APIXABAN 5 MG: 5 TABLET, FILM COATED ORAL at 08:06

## 2024-03-03 NOTE — PLAN OF CARE
Problem: Potential for Falls  Goal: Patient will remain free of falls  Description: INTERVENTIONS:  - Educate patient/family on patient safety including physical limitations  - Instruct patient to call for assistance with activity   - Consult OT/PT to assist with strengthening/mobility   - Keep Call bell within reach  - Keep bed low and locked with side rails adjusted as appropriate  - Keep care items and personal belongings within reach  - Initiate and maintain comfort rounds  - Make Fall Risk Sign visible to staff  - Obtain necessary fall risk management equipment: non-slip socks  - Apply yellow socks and bracelet for high fall risk patients  - Consider moving patient to room near nurses station  Outcome: Progressing     Problem: PAIN - ADULT  Goal: Verbalizes/displays adequate comfort level or baseline comfort level  Description: Interventions:  - Encourage patient to monitor pain and request assistance  - Assess pain using appropriate pain scale  - Administer analgesics based on type and severity of pain and evaluate response  - Implement non-pharmacological measures as appropriate and evaluate response  - Consider cultural and social influences on pain and pain management  - Notify physician/advanced practitioner if interventions unsuccessful or patient reports new pain  Outcome: Progressing     Problem: INFECTION - ADULT  Goal: Absence or prevention of progression during hospitalization  Description: INTERVENTIONS:  - Assess and monitor for signs and symptoms of infection  - Monitor lab/diagnostic results  - Monitor all insertion sites, i.e. indwelling lines, tubes, and drains  - Monitor endotracheal if appropriate and nasal secretions for changes in amount and color  - [place] appropriate cooling/warming therapies per order  - Administer medications as ordered  - Instruct and encourage patient and family to use good hand hygiene technique  - Identify and instruct in appropriate isolation precautions for  identified infection/condition  Outcome: Progressing     Problem: SAFETY ADULT  Goal: Patient will remain free of falls  Description: INTERVENTIONS:  - Educate patient/family on patient safety including physical limitations  - Instruct patient to call for assistance with activity   - Consult OT/PT to assist with strengthening/mobility   - Keep Call bell within reach  - Keep bed low and locked with side rails adjusted as appropriate  - Keep care items and personal belongings within reach  - Initiate and maintain comfort rounds  - Make Fall Risk Sign visible to staff  - Obtain necessary fall risk management equipment: non-slip socks  - Apply yellow socks and bracelet for high fall risk patients  - Consider moving patient to room near nurses station  Outcome: Progressing  Goal: Maintain or return to baseline ADL function  Description: INTERVENTIONS:  - Educate patient/family on patient safety including physical limitations  - Instruct patient to call for assistance with activity   - Consult OT/PT to assist with strengthening/mobility   - Keep Call bell within reach  - Keep bed low and locked with side rails adjusted as appropriate  - Keep care items and personal belongings within reach  - Initiate and maintain comfort rounds  - Make Fall Risk Sign visible to staff  - Obtain necessary fall risk management equipment: non-slip socks  - Apply yellow socks and bracelet for high fall risk patients  - Consider moving patient to room near nurses station  Outcome: Progressing  Goal: Maintains/Returns to pre admission functional level  Description: INTERVENTIONS:  - Perform AM-PAC 6 Click Basic Mobility/ Daily Activity assessment daily.  - Set and communicate daily mobility goal to care team and patient/family/caregiver.   - Collaborate with rehabilitation services on mobility goals if consulted  - Perform Range of Motion 2 times a day.  - Reposition patient every 2 hours.  - Dangle patient 2 times a day  - Stand patient 2 times  a day  - Ambulate patient 2 times a day  - Out of bed to chair 2 times a day   - Out of bed for meals 2 times a day  - Out of bed for toileting  - Record patient progress and toleration of activity level   Outcome: Progressing     Problem: DISCHARGE PLANNING  Goal: Discharge to home or other facility with appropriate resources  Description: INTERVENTIONS:  - Identify barriers to discharge w/patient and caregiver  - Arrange for needed discharge resources and transportation as appropriate  - Identify discharge learning needs (meds, wound care, etc.)  - Arrange for interpretive services to assist at discharge as needed  - Refer to Case Management Department for coordinating discharge planning if the patient needs post-hospital services based on physician/advanced practitioner order or complex needs related to functional status, cognitive ability, or social support system  Outcome: Progressing     Problem: Knowledge Deficit  Goal: Patient/family/caregiver demonstrates understanding of disease process, treatment plan, medications, and discharge instructions  Description: Complete learning assessment and assess knowledge base.  Interventions:  - Provide teaching at level of understanding  - Provide teaching via preferred learning methods  Outcome: Progressing     Problem: CARDIOVASCULAR - ADULT  Goal: Maintains optimal cardiac output and hemodynamic stability  Description: INTERVENTIONS:  - Monitor I/O, vital signs and rhythm  - Monitor for S/S and trends of decreased cardiac output  - Administer and titrate ordered vasoactive medications to optimize hemodynamic stability  - Assess quality of pulses, skin color and temperature  - Assess for signs of decreased coronary artery perfusion  - Instruct patient to report change in severity of symptoms  Outcome: Progressing  Goal: Absence of cardiac dysrhythmias or at baseline rhythm  Description: INTERVENTIONS:  - Continuous cardiac monitoring, vital signs, obtain 12 lead EKG if  ordered  - Administer antiarrhythmic and heart rate control medications as ordered  - Monitor electrolytes and administer replacement therapy as ordered  Outcome: Progressing     Problem: RESPIRATORY - ADULT  Goal: Achieves optimal ventilation and oxygenation  Description: INTERVENTIONS:  - Assess for changes in respiratory status  - Assess for changes in mentation and behavior  - Position to facilitate oxygenation and minimize respiratory effort  - Oxygen administered by appropriate delivery if ordered  - Initiate smoking cessation education as indicated  - Encourage broncho-pulmonary hygiene including cough, deep breathe, Incentive Spirometry  - Assess the need for suctioning and aspirate as needed  - Assess and instruct to report SOB or any respiratory difficulty  - Respiratory Therapy support as indicated  Outcome: Progressing     Problem: METABOLIC, FLUID AND ELECTROLYTES - ADULT  Goal: Electrolytes maintained within normal limits  Description: INTERVENTIONS:  - Monitor labs and assess patient for signs and symptoms of electrolyte imbalances  - Administer electrolyte replacement as ordered  - Monitor response to electrolyte replacements, including repeat lab results as appropriate  - Instruct patient on fluid and nutrition as appropriate  Outcome: Progressing  Goal: Fluid balance maintained  Description: INTERVENTIONS:  - Monitor labs   - Monitor I/O and WT  - Instruct patient on fluid and nutrition as appropriate  - Assess for signs & symptoms of volume excess or deficit  Outcome: Progressing     Problem: Prexisting or High Potential for Compromised Skin Integrity  Goal: Skin integrity is maintained or improved  Description: INTERVENTIONS:  - Identify patients at risk for skin breakdown  - Assess and monitor skin integrity  - Assess and monitor nutrition and hydration status  - Monitor labs   - Assess for incontinence   - Turn and reposition patient  - Assist with mobility/ambulation  - Relieve pressure over  bony prominences  - Avoid friction and shearing  - Provide appropriate hygiene as needed including keeping skin clean and dry  - Evaluate need for skin moisturizer/barrier cream  - Collaborate with interdisciplinary team   - Patient/family teaching  - Consider wound care consult   Outcome: Progressing

## 2024-03-03 NOTE — ASSESSMENT & PLAN NOTE
Wt Readings from Last 3 Encounters:   03/03/24 122 kg (268 lb 8 oz)   02/26/24 127 kg (279 lb)   02/26/24 127 kg (279 lb)     Seen in the cardiology office 2/26  Approximately 15 pounds weight gain in the past 2 weeks.  Noncompliant with salt diet  On Bumex 6 mg twice daily recently increased   patient obese  Chest x-ray with congestion  Last echo August 2023 showed LVEF 65% diastolic dysfunction 2, R VSP 61 mmHg  On Bumex drip, had  7.6 L urine output/24 hours , negative  25 L since admission   Noncompliant with fluids restriction, drinks water from the sink, per nurse     Plan:   Continue Bumex drip  Strict intake output  Daily weights  Low-salt diet  Continue metoprolol and Jardiance  continue Aldactone 100 mg

## 2024-03-03 NOTE — ASSESSMENT & PLAN NOTE
On metoprolol 100 mg daily and Eliquis 5 twice daily  Intermittently uncontrolled    metoprolol 50 mg pm was added on 3/1  Better HR   Monitor

## 2024-03-03 NOTE — PLAN OF CARE
Problem: Potential for Falls  Goal: Patient will remain free of falls  Description: INTERVENTIONS:  - Educate patient/family on patient safety including physical limitations  - Instruct patient to call for assistance with activity   - Consult OT/PT to assist with strengthening/mobility   - Keep Call bell within reach  - Keep bed low and locked with side rails adjusted as appropriate  - Keep care items and personal belongings within reach  - Initiate and maintain comfort rounds  - Make Fall Risk Sign visible to staff  - Obtain necessary fall risk management equipment: non-slip socks  - Apply yellow socks and bracelet for high fall risk patients  - Consider moving patient to room near nurses station  Outcome: Progressing     Problem: PAIN - ADULT  Goal: Verbalizes/displays adequate comfort level or baseline comfort level  Description: Interventions:  - Encourage patient to monitor pain and request assistance  - Assess pain using appropriate pain scale  - Administer analgesics based on type and severity of pain and evaluate response  - Implement non-pharmacological measures as appropriate and evaluate response  - Consider cultural and social influences on pain and pain management  - Notify physician/advanced practitioner if interventions unsuccessful or patient reports new pain  Outcome: Progressing     Problem: INFECTION - ADULT  Goal: Absence or prevention of progression during hospitalization  Description: INTERVENTIONS:  - Assess and monitor for signs and symptoms of infection  - Monitor lab/diagnostic results  - Monitor all insertion sites, i.e. indwelling lines, tubes, and drains  - Monitor endotracheal if appropriate and nasal secretions for changes in amount and color  - [place] appropriate cooling/warming therapies per order  - Administer medications as ordered  - Instruct and encourage patient and family to use good hand hygiene technique  - Identify and instruct in appropriate isolation precautions for  identified infection/condition  Outcome: Progressing     Problem: SAFETY ADULT  Goal: Patient will remain free of falls  Description: INTERVENTIONS:  - Educate patient/family on patient safety including physical limitations  - Instruct patient to call for assistance with activity   - Consult OT/PT to assist with strengthening/mobility   - Keep Call bell within reach  - Keep bed low and locked with side rails adjusted as appropriate  - Keep care items and personal belongings within reach  - Initiate and maintain comfort rounds  - Make Fall Risk Sign visible to staff  - Obtain necessary fall risk management equipment: non-slip socks  - Apply yellow socks and bracelet for high fall risk patients  - Consider moving patient to room near nurses station  Outcome: Progressing  Goal: Maintain or return to baseline ADL function  Description: INTERVENTIONS:  - Educate patient/family on patient safety including physical limitations  - Instruct patient to call for assistance with activity   - Consult OT/PT to assist with strengthening/mobility   - Keep Call bell within reach  - Keep bed low and locked with side rails adjusted as appropriate  - Keep care items and personal belongings within reach  - Initiate and maintain comfort rounds  - Make Fall Risk Sign visible to staff  - Obtain necessary fall risk management equipment: non-slip socks  - Apply yellow socks and bracelet for high fall risk patients  - Consider moving patient to room near nurses station  Outcome: Progressing  Goal: Maintains/Returns to pre admission functional level  Description: INTERVENTIONS:  - Perform AM-PAC 6 Click Basic Mobility/ Daily Activity assessment daily.  - Set and communicate daily mobility goal to care team and patient/family/caregiver.   - Collaborate with rehabilitation services on mobility goals if consulted  - Perform Range of Motion 2 times a day.  - Reposition patient every 2 hours.  - Dangle patient 2 times a day  - Stand patient 2 times  a day  - Ambulate patient 2 times a day  - Out of bed to chair 2 times a day   - Out of bed for meals 2 times a day  - Out of bed for toileting  - Record patient progress and toleration of activity level   Outcome: Progressing     Problem: DISCHARGE PLANNING  Goal: Discharge to home or other facility with appropriate resources  Description: INTERVENTIONS:  - Identify barriers to discharge w/patient and caregiver  - Arrange for needed discharge resources and transportation as appropriate  - Identify discharge learning needs (meds, wound care, etc.)  - Arrange for interpretive services to assist at discharge as needed  - Refer to Case Management Department for coordinating discharge planning if the patient needs post-hospital services based on physician/advanced practitioner order or complex needs related to functional status, cognitive ability, or social support system  Outcome: Progressing     Problem: Knowledge Deficit  Goal: Patient/family/caregiver demonstrates understanding of disease process, treatment plan, medications, and discharge instructions  Description: Complete learning assessment and assess knowledge base.  Interventions:  - Provide teaching at level of understanding  - Provide teaching via preferred learning methods  Outcome: Progressing     Problem: CARDIOVASCULAR - ADULT  Goal: Maintains optimal cardiac output and hemodynamic stability  Description: INTERVENTIONS:  - Monitor I/O, vital signs and rhythm  - Monitor for S/S and trends of decreased cardiac output  - Administer and titrate ordered vasoactive medications to optimize hemodynamic stability  - Assess quality of pulses, skin color and temperature  - Assess for signs of decreased coronary artery perfusion  - Instruct patient to report change in severity of symptoms  Outcome: Progressing  Goal: Absence of cardiac dysrhythmias or at baseline rhythm  Description: INTERVENTIONS:  - Continuous cardiac monitoring, vital signs, obtain 12 lead EKG if  ordered  - Administer antiarrhythmic and heart rate control medications as ordered  - Monitor electrolytes and administer replacement therapy as ordered  Outcome: Progressing     Problem: RESPIRATORY - ADULT  Goal: Achieves optimal ventilation and oxygenation  Description: INTERVENTIONS:  - Assess for changes in respiratory status  - Assess for changes in mentation and behavior  - Position to facilitate oxygenation and minimize respiratory effort  - Oxygen administered by appropriate delivery if ordered  - Initiate smoking cessation education as indicated  - Encourage broncho-pulmonary hygiene including cough, deep breathe, Incentive Spirometry  - Assess the need for suctioning and aspirate as needed  - Assess and instruct to report SOB or any respiratory difficulty  - Respiratory Therapy support as indicated  Outcome: Progressing     Problem: METABOLIC, FLUID AND ELECTROLYTES - ADULT  Goal: Electrolytes maintained within normal limits  Description: INTERVENTIONS:  - Monitor labs and assess patient for signs and symptoms of electrolyte imbalances  - Administer electrolyte replacement as ordered  - Monitor response to electrolyte replacements, including repeat lab results as appropriate  - Instruct patient on fluid and nutrition as appropriate  Outcome: Progressing  Goal: Fluid balance maintained  Description: INTERVENTIONS:  - Monitor labs   - Monitor I/O and WT  - Instruct patient on fluid and nutrition as appropriate  - Assess for signs & symptoms of volume excess or deficit  Outcome: Progressing     Problem: Prexisting or High Potential for Compromised Skin Integrity  Goal: Skin integrity is maintained or improved  Description: INTERVENTIONS:  - Identify patients at risk for skin breakdown  - Assess and monitor skin integrity  - Assess and monitor nutrition and hydration status  - Monitor labs   - Assess for incontinence   - Turn and reposition patient  - Assist with mobility/ambulation  - Relieve pressure over  bony prominences  - Avoid friction and shearing  - Provide appropriate hygiene as needed including keeping skin clean and dry  - Evaluate need for skin moisturizer/barrier cream  - Collaborate with interdisciplinary team   - Patient/family teaching  - Consider wound care consult   Outcome: Progressing

## 2024-03-03 NOTE — PROGRESS NOTES
[facility]  Progress Note  Name: Mattie Joy I  MRN: [id number]  Unit/Bed#: -01 I Date of Admission: 2/26/2024   Date of Service: 3/3/2024 I Hospital Day: 6    Assessment/Plan   * Acute on chronic respiratory failure (HCC)  Assessment & Plan  At baseline on 4 L nasal cannula oxygen secondary to severe COPD, pulmonary hypertension  Required up to 7 L nasal cannula oxygen on presentation  Likely decompensated secondary to CHF  Flu/COVID/RSV negative  Monitor with diuresis    Currently saturating fine at 4 L nasal cannula oxygen which is her baseline    Acute on chronic diastolic CHF (congestive heart failure) (HCC)  Assessment & Plan  Wt Readings from Last 3 Encounters:   03/03/24 122 kg (268 lb 8 oz)   02/26/24 127 kg (279 lb)   02/26/24 127 kg (279 lb)     Seen in the cardiology office 2/26  Approximately 15 pounds weight gain in the past 2 weeks.  Noncompliant with salt diet  On Bumex 6 mg twice daily recently increased   patient obese  Chest x-ray with congestion  Last echo August 2023 showed LVEF 65% diastolic dysfunction 2, R VSP 61 mmHg  On Bumex drip, had  7.6 L urine output/24 hours , negative  25 L since admission   Noncompliant with fluids restriction, drinks water from the sink, per nurse     Plan:   Continue Bumex drip  Strict intake output  Daily weights  Low-salt diet  Continue metoprolol and Jardiance  continue Aldactone 100 mg                   COPD, severe (HCC)  Assessment & Plan  Followed with pulmonology.  On albuterol nebulizers as needed, Pulmicort, Claritin, Singulair, bevespi   Noncompliant with medication.  Continue Pulmicort/albuterol, Claritin, Singulair    Not in COPD exacerbation currently    Morbid obesity (HCC)  Assessment & Plan  Lifestyle changes encouraged     Current moderate episode of major depressive disorder without prior episode ([facility])  Assessment & Plan  On Cymbalta, trazodone and Lexapro.  Reported Lexapro was discontinued  Continue  Cymbalta and trazodone    Paroxysmal atrial fibrillation (HCC)  Assessment & Plan  On metoprolol 100 mg daily and Eliquis 5 twice daily  Intermittently uncontrolled    metoprolol 50 mg pm was added on 3/1  Better HR   Monitor       Type 2 diabetes mellitus with stage 2 chronic kidney disease, with long-term current use of insulin (HCC)  Assessment & Plan  Lab Results   Component Value Date    HGBA1C 8.5 (H) 01/22/2024       Recent Labs     03/02/24  1656 03/02/24 2007 03/03/24  0721 03/03/24  1056   POCGLU 88 120 104 191*         Blood Sugar Average: Last 72 hrs:  (P) 136.5  On Lantus 55 units at bedtime and Apidra 25 units 3 times daily with meals.  Had episode of hypoglycemia   Decrease Lantus to 45 units at bedtime, Humalog to 15 units 3 times daily with meals and sliding scale  Diabetic diet  hypoGlycemia protocol    Constipation-resolved as of 2/29/2024  Assessment & Plan  Started bowel regimen             VTE Pharmacologic Prophylaxis: VTE Score: 6 High Risk (Score >/= 5) - Pharmacological DVT Prophylaxis Ordered: apixaban (Eliquis). Sequential Compression Devices Ordered.    Mobility:   Basic Mobility Inpatient Raw Score: 22  JH-HLM Goal: 7: Walk 25 feet or more  JH-HLM Achieved: 7: Walk 25 feet or more  HLM Goal achieved. Continue to encourage appropriate mobility.    Patient Centered Rounds: I performed bedside rounds with nursing staff today.   Discussions with Specialists or Other Care Team Provider:     Education and Discussions with Family / Patient: Patient declined call to .     Total Time Spent on Date of Encounter in care of patient: 30 mins. This time was spent on one or more of the following: performing physical exam; counseling and coordination of care; obtaining or reviewing history; documenting in the medical record; reviewing/ordering tests, medications or procedures; communicating with other healthcare professionals and discussing with patient's family/caregivers.    Current  Length of Stay: 6 day(s)  Current Patient Status: Inpatient   Certification Statement: The patient will continue to require additional inpatient hospital stay due to chf  Discharge Plan: Anticipate discharge in 48-72 hrs to home.    Code Status: Level 1 - Full Code    Subjective:     No complaints     Objective:     Vitals:   Temp (24hrs), Av.7 °F (36.5 °C), Min:97.5 °F (36.4 °C), Max:97.8 °F (36.6 °C)    Temp:  [97.5 °F (36.4 °C)-97.8 °F (36.6 °C)] 97.5 °F (36.4 °C)  HR:  [] 98  Resp:  [17-18] 17  BP: (107-112)/(64-65) 111/65  SpO2:  [93 %-96 %] 96 %  Body mass index is 47.56 kg/m².     Input and Output Summary (last 24 hours):     Intake/Output Summary (Last 24 hours) at 3/3/2024 1311  Last data filed at 3/3/2024 1300  Gross per 24 hour   Intake 2620 ml   Output 6250 ml   Net -3630 ml       Physical Exam:   Physical Exam  Vitals and nursing note reviewed.   Constitutional:       General: She is not in acute distress.     Appearance: She is obese. She is not diaphoretic.   HENT:      Head: Normocephalic.   Eyes:      General: No scleral icterus.        Right eye: No discharge.         Left eye: No discharge.   Cardiovascular:      Rate and Rhythm: Normal rate and regular rhythm.      Heart sounds: No murmur heard.  Pulmonary:      Effort: Pulmonary effort is normal. No respiratory distress.      Breath sounds: Normal breath sounds. No wheezing, rhonchi or rales.   Abdominal:      General: There is no distension.      Palpations: Abdomen is soft.      Tenderness: There is no abdominal tenderness. There is no guarding or rebound.   Musculoskeletal:      Cervical back: Normal range of motion.      Right lower leg: No edema.      Left lower leg: No edema.   Skin:     General: Skin is warm.      Coloration: Skin is pale.   Neurological:      Mental Status: She is alert and oriented to person, place, and time.   Psychiatric:         Mood and Affect: Mood normal.         Behavior: Behavior normal.          Thought Content: Thought content normal.         Judgment: Judgment normal.          Additional Data:     Labs:  Results from last 7 days   Lab Units 03/03/24  0217 02/28/24  0229 02/27/24  0441   WBC Thousand/uL 14.47*   < > 11.04*   HEMOGLOBIN g/dL 10.5*   < > 9.3*   HEMATOCRIT % 38.2   < > 33.3*   PLATELETS Thousands/uL 337   < > 263   NEUTROS PCT %  --   --  71   LYMPHS PCT %  --   --  19   MONOS PCT %  --   --  9   EOS PCT %  --   --  0    < > = values in this interval not displayed.     Results from last 7 days   Lab Units 03/03/24  0217 02/26/24  1423 02/26/24  1408   SODIUM mmol/L 136   < > 139   POTASSIUM mmol/L 3.5   < > 3.6   CHLORIDE mmol/L 92*   < > 94*   CO2 mmol/L 36*   < > 37*   CO2, I-STAT   --    < >  --    BUN mg/dL 22   < > 16   CREATININE mg/dL 0.70   < > 0.72   ANION GAP mmol/L 8   < > 8   CALCIUM mg/dL 8.9   < > 9.3   ALBUMIN g/dL  --   --  4.1   TOTAL BILIRUBIN mg/dL  --   --  0.39   ALK PHOS U/L  --   --  147*   ALT U/L  --   --  26   AST U/L  --   --  20   GLUCOSE RANDOM mg/dL 62*   < > 83    < > = values in this interval not displayed.     Results from last 7 days   Lab Units 02/26/24  1408   INR  1.33*     Results from last 7 days   Lab Units 03/03/24  1056 03/03/24  0721 03/02/24  2007 03/02/24  1656 03/02/24  1106 03/02/24  0700 03/01/24  2059 03/01/24  1706 03/01/24  1147 03/01/24  0736 02/29/24 2127 02/29/24  1658   POC GLUCOSE mg/dl 191* 104 120 88 184* 117 123 89 154* 123 123 151*         Results from last 7 days   Lab Units 02/26/24  1648 02/26/24  1408   LACTIC ACID mmol/L 0.8 2.3*   PROCALCITONIN ng/ml  --  0.06       Lines/Drains:  Invasive Devices       Peripheral Intravenous Line  Duration             Peripheral IV 03/01/24 Left;Ventral (anterior) Forearm 1 day                      Telemetry:  Telemetry Orders (From admission, onward)               24 Hour Telemetry Monitoring  Continuous x 24 Hours (Telem)        Expiring   Question:  Reason for 24 Hour Telemetry  Answer:   Decompensated CHF- and any one of the following: continuous diuretic infusion or total diuretic dose >200 mg daily, associated electrolyte derangement (I.e. K < 3.0), ionotropic drip (continuous infusion), hx of ventricular arrhythmia, or new EF < 35%                     Telemetry Reviewed: Atrial fibrillation. HR averaging 110  Indication for Continued Telemetry Use: Acute CHF on >200 mg lasix/day or equivalent dose or with new reduced EF.              Imaging: No pertinent imaging reviewed.    Recent Cultures (last 7 days):   Results from last 7 days   Lab Units 02/26/24  2120 02/26/24  1419 02/26/24  1408   BLOOD CULTURE   --  No Growth After 5 Days. No Growth After 5 Days.   URINE CULTURE  40,000-49,000 cfu/ml  --   --        Last 24 Hours Medication List:   Current Facility-Administered Medications   Medication Dose Route Frequency Provider Last Rate    acetaminophen  650 mg Oral Q6H PRN Rosamaria Miller PA-C      albuterol  2 puff Inhalation Q4H PRN Magdalena Fleming MD      albuterol  2.5 mg Nebulization Q6H PRN Magdalena Fleming MD      apixaban  5 mg Oral BID Magdalena Fleming MD      atorvastatin  40 mg Oral Daily Magdalena Fleming MD      budesonide  0.5 mg Nebulization Q12H Magdalena Fleming MD      bumetanide (BUMEX) 12.5 mg infusion 50 mL  0.5 mg/hr Intravenous Continuous Magdalena Fleming MD 0.5 mg/hr (03/02/24 2111)    DULoxetine  60 mg Oral Daily Magdalena Fleming MD      Empagliflozin  10 mg Oral Daily Magdalena Fleming MD      fluticasone  1 spray Nasal BID Magdalena Fleming MD      insulin glargine  45 Units Subcutaneous HS Magdalena Fleming MD      insulin lispro  1-5 Units Subcutaneous HS Magdalena Fleming MD      insulin lispro  2-12 Units Subcutaneous TID AC Magdalena Fleming MD      insulin lispro  15 Units Subcutaneous TID With Meals Magdalena Fleming MD      lidocaine  1 patch Topical Daily Magdalena Fleming MD      loratadine  10 mg Oral Daily PRN  Magdalena Fleming MD      LORazepam  0.5 mg Oral Daily PRN Magdalena Fleming MD      metoprolol succinate  100 mg Oral QAM Fran John PA-C      metoprolol succinate  50 mg Oral QPM Fran John PA-C      montelukast  10 mg Oral HS Magdalena Fleming MD      nystatin   Topical BID Magdalena Fleming MD      pantoprazole  40 mg Oral Early Morning Magdalena Fleming MD      spironolactone  100 mg Oral Daily Magdalena Fleming MD      traZODone  150 mg Oral HS Magdalena Fleming MD          Today, Patient Was Seen By: Magdalena Fleming MD    **Please Note: This note may have been constructed using a voice recognition system.**

## 2024-03-03 NOTE — ASSESSMENT & PLAN NOTE
Lab Results   Component Value Date    HGBA1C 8.5 (H) 01/22/2024       Recent Labs     03/02/24  1656 03/02/24 2007 03/03/24  0721 03/03/24  1056   POCGLU 88 120 104 191*         Blood Sugar Average: Last 72 hrs:  (P) 136.5  On Lantus 55 units at bedtime and Apidra 25 units 3 times daily with meals.  Had episode of hypoglycemia   Decrease Lantus to 45 units at bedtime, Humalog to 15 units 3 times daily with meals and sliding scale  Diabetic diet  hypoGlycemia protocol

## 2024-03-04 LAB
ALBUMIN SERPL BCP-MCNC: 4.1 G/DL (ref 3.5–5)
ALP SERPL-CCNC: 133 U/L (ref 34–104)
ALT SERPL W P-5'-P-CCNC: 11 U/L (ref 7–52)
ANION GAP SERPL CALCULATED.3IONS-SCNC: 10 MMOL/L
AST SERPL W P-5'-P-CCNC: 15 U/L (ref 13–39)
BILIRUB SERPL-MCNC: 0.39 MG/DL (ref 0.2–1)
BUN SERPL-MCNC: 20 MG/DL (ref 5–25)
CALCIUM SERPL-MCNC: 8.9 MG/DL (ref 8.4–10.2)
CHLORIDE SERPL-SCNC: 92 MMOL/L (ref 96–108)
CO2 SERPL-SCNC: 34 MMOL/L (ref 21–32)
CREAT SERPL-MCNC: 0.69 MG/DL (ref 0.6–1.3)
ERYTHROCYTE [DISTWIDTH] IN BLOOD BY AUTOMATED COUNT: 18.7 % (ref 11.6–15.1)
GFR SERPL CREATININE-BSD FRML MDRD: 91 ML/MIN/1.73SQ M
GLUCOSE SERPL-MCNC: 105 MG/DL (ref 65–140)
GLUCOSE SERPL-MCNC: 140 MG/DL (ref 65–140)
GLUCOSE SERPL-MCNC: 175 MG/DL (ref 65–140)
GLUCOSE SERPL-MCNC: 176 MG/DL (ref 65–140)
GLUCOSE SERPL-MCNC: 177 MG/DL (ref 65–140)
HCT VFR BLD AUTO: 37.5 % (ref 34.8–46.1)
HGB BLD-MCNC: 10.4 G/DL (ref 11.5–15.4)
MCH RBC QN AUTO: 18.6 PG (ref 26.8–34.3)
MCHC RBC AUTO-ENTMCNC: 27.7 G/DL (ref 31.4–37.4)
MCV RBC AUTO: 67 FL (ref 82–98)
PLATELET # BLD AUTO: 335 THOUSANDS/UL (ref 149–390)
PMV BLD AUTO: 10.6 FL (ref 8.9–12.7)
POTASSIUM SERPL-SCNC: 3.8 MMOL/L (ref 3.5–5.3)
PROT SERPL-MCNC: 6.8 G/DL (ref 6.4–8.4)
RBC # BLD AUTO: 5.59 MILLION/UL (ref 3.81–5.12)
SODIUM SERPL-SCNC: 136 MMOL/L (ref 135–147)
WBC # BLD AUTO: 11.54 THOUSAND/UL (ref 4.31–10.16)

## 2024-03-04 PROCEDURE — 99232 SBSQ HOSP IP/OBS MODERATE 35: CPT | Performed by: INTERNAL MEDICINE

## 2024-03-04 PROCEDURE — 85027 COMPLETE CBC AUTOMATED: CPT | Performed by: INTERNAL MEDICINE

## 2024-03-04 PROCEDURE — 94660 CPAP INITIATION&MGMT: CPT

## 2024-03-04 PROCEDURE — 94760 N-INVAS EAR/PLS OXIMETRY 1: CPT

## 2024-03-04 PROCEDURE — 82948 REAGENT STRIP/BLOOD GLUCOSE: CPT

## 2024-03-04 PROCEDURE — 80053 COMPREHEN METABOLIC PANEL: CPT | Performed by: INTERNAL MEDICINE

## 2024-03-04 PROCEDURE — 94640 AIRWAY INHALATION TREATMENT: CPT

## 2024-03-04 RX ORDER — BUMETANIDE 0.25 MG/ML
0.5 INJECTION INTRAMUSCULAR; INTRAVENOUS CONTINUOUS
Status: DISCONTINUED | OUTPATIENT
Start: 2024-03-04 | End: 2024-03-05

## 2024-03-04 RX ADMIN — BUDESONIDE 0.5 MG: 0.5 INHALANT ORAL at 19:44

## 2024-03-04 RX ADMIN — INSULIN LISPRO 1 UNITS: 100 INJECTION, SOLUTION INTRAVENOUS; SUBCUTANEOUS at 21:25

## 2024-03-04 RX ADMIN — PANTOPRAZOLE SODIUM 40 MG: 40 TABLET, DELAYED RELEASE ORAL at 05:24

## 2024-03-04 RX ADMIN — SPIRONOLACTONE 100 MG: 25 TABLET ORAL at 08:14

## 2024-03-04 RX ADMIN — FLUTICASONE PROPIONATE 1 SPRAY: 50 SPRAY, METERED NASAL at 21:29

## 2024-03-04 RX ADMIN — METOPROLOL SUCCINATE 100 MG: 50 TABLET, EXTENDED RELEASE ORAL at 08:14

## 2024-03-04 RX ADMIN — ACETAMINOPHEN 650 MG: 325 TABLET, FILM COATED ORAL at 21:25

## 2024-03-04 RX ADMIN — DULOXETINE HYDROCHLORIDE 60 MG: 30 CAPSULE, DELAYED RELEASE ORAL at 08:14

## 2024-03-04 RX ADMIN — ATORVASTATIN CALCIUM 40 MG: 40 TABLET, FILM COATED ORAL at 08:15

## 2024-03-04 RX ADMIN — INSULIN LISPRO 2 UNITS: 100 INJECTION, SOLUTION INTRAVENOUS; SUBCUTANEOUS at 17:32

## 2024-03-04 RX ADMIN — INSULIN LISPRO 15 UNITS: 100 INJECTION, SOLUTION INTRAVENOUS; SUBCUTANEOUS at 17:34

## 2024-03-04 RX ADMIN — TRAZODONE HYDROCHLORIDE 150 MG: 150 TABLET ORAL at 21:25

## 2024-03-04 RX ADMIN — ACETAMINOPHEN 650 MG: 325 TABLET, FILM COATED ORAL at 08:23

## 2024-03-04 RX ADMIN — INSULIN LISPRO 15 UNITS: 100 INJECTION, SOLUTION INTRAVENOUS; SUBCUTANEOUS at 08:20

## 2024-03-04 RX ADMIN — BUDESONIDE 0.5 MG: 0.5 INHALANT ORAL at 07:58

## 2024-03-04 RX ADMIN — INSULIN GLARGINE 45 UNITS: 100 INJECTION, SOLUTION SUBCUTANEOUS at 21:25

## 2024-03-04 RX ADMIN — MONTELUKAST 10 MG: 10 TABLET, FILM COATED ORAL at 22:21

## 2024-03-04 RX ADMIN — INSULIN LISPRO 2 UNITS: 100 INJECTION, SOLUTION INTRAVENOUS; SUBCUTANEOUS at 11:33

## 2024-03-04 RX ADMIN — LIDOCAINE 5% 1 PATCH: 700 PATCH TOPICAL at 08:20

## 2024-03-04 RX ADMIN — METOPROLOL SUCCINATE 50 MG: 50 TABLET, EXTENDED RELEASE ORAL at 17:28

## 2024-03-04 RX ADMIN — APIXABAN 5 MG: 5 TABLET, FILM COATED ORAL at 08:15

## 2024-03-04 RX ADMIN — EMPAGLIFLOZIN 10 MG: 10 TABLET, FILM COATED ORAL at 08:21

## 2024-03-04 RX ADMIN — INSULIN LISPRO 15 UNITS: 100 INJECTION, SOLUTION INTRAVENOUS; SUBCUTANEOUS at 11:34

## 2024-03-04 RX ADMIN — APIXABAN 5 MG: 5 TABLET, FILM COATED ORAL at 17:29

## 2024-03-04 RX ADMIN — Medication 0.5 MG/HR: at 19:13

## 2024-03-04 RX ADMIN — FLUTICASONE PROPIONATE 1 SPRAY: 50 SPRAY, METERED NASAL at 08:24

## 2024-03-04 NOTE — PROGRESS NOTES
formerly [facility]  Progress Note  Name: Mattie Joy I  MRN: [id number]  Unit/Bed#: -01 I Date of Admission: 2/26/2024   Date of Service: 3/4/2024 I Hospital Day: 7    Assessment/Plan   Acute on chronic diastolic CHF (congestive heart failure) (HCC)  Assessment & Plan  Wt Readings from Last 3 Encounters:   03/04/24 121 kg (266 lb 12.8 oz)   02/26/24 127 kg (279 lb)   02/26/24 127 kg (279 lb)     Seen in the cardiology office 2/26  Approximately 15 pounds weight gain in the past 2 weeks.  Noncompliant with salt diet     patient is obese  Chest x-ray with congestion  Last echo August 2023 showed LVEF 65% diastolic dysfunction 2, R VSP 61 mmHg  On Bumex drip, had  7.6 L urine output/24 hours   Noncompliant with fluids restriction, drinks water from the sink, per nurse     Plan:   Cardiology following, appreciate recommendations  Plan to discontinue Bumex drip today and start p.o. Bumex in a.m.   Strict intake output, Daily weights  Low-salt diet  Continue metoprolol and Jardiance  continue Aldactone 100 mg                   COPD, severe (HCC)  Assessment & Plan  Followed with pulmonology.  On albuterol nebulizers as needed, Pulmicort, Claritin, Singulair, bevespi   Noncompliant with medication.  Continue Pulmicort/albuterol, Claritin, Singulair    Not in COPD exacerbation currently    Morbid obesity (HCC)  Assessment & Plan  Lifestyle changes encouraged     Current moderate episode of major depressive disorder without prior episode (HCC)  Assessment & Plan  On Cymbalta, trazodone and Lexapro.  Reported Lexapro was discontinued  Continue Cymbalta and trazodone    Paroxysmal atrial fibrillation (HCC)  Assessment & Plan  On metoprolol 100 mg daily and Eliquis 5 twice daily          Type 2 diabetes mellitus with stage 2 chronic kidney disease, with long-term current use of insulin (HCC)  Assessment & Plan  Lab Results   Component Value Date    HGBA1C 8.5 (H) 01/22/2024       Recent Labs      03/03/24  1605 03/03/24  2141 03/04/24  0743 03/04/24  1124   POCGLU 154* 112 140 176*       Blood Sugar Average: Last 72 hrs:  (P) [id number]  On Lantus 55 units at bedtime and Apidra 25 units 3 times daily with meals.    Continue with Lantus 45 units at bedtime, Humalog  15 units 3 times daily with meals and sliding scale  Diabetic diet  Frequent Accu-Cheks and hypoglycemia protocol    * Acute on chronic respiratory failure (HCC)  Assessment & Plan  At baseline on 4 L nasal cannula oxygen secondary to severe COPD, pulmonary hypertension  Required up to 7 L nasal cannula oxygen on presentation  Likely decompensated secondary to CHF  Flu/COVID/RSV negative  Monitor with diuresis    Currently saturating fine at 4 L nasal cannula oxygen which is her baseline               VTE Pharmacologic Prophylaxis: VTE Score: 6 Moderate Risk (Score 3-4) - Pharmacological DVT Prophylaxis Ordered: apixaban (Eliquis).    Mobility:   Basic Mobility Inpatient Raw Score: 23  JH-HLM Goal: 7: Walk 25 feet or more  JH-HLM Achieved: 7: Walk 25 feet or more  HLM Goal achieved. Continue to encourage appropriate mobility.    Patient Centered Rounds: I performed bedside rounds with nursing staff today.   Discussions with Specialists or Other Care Team Provider: yes    Education and Discussions with Family / Patient:  yes.     Total Time Spent on Date of Encounter in care of patient: 38 mins. This time was spent on one or more of the following: performing physical exam; counseling and coordination of care; obtaining or reviewing history; documenting in the medical record; reviewing/ordering tests, medications or procedures; communicating with other healthcare professionals and discussing with patient's family/caregivers.    Current Length of Stay: 7 day(s)  Current Patient Status: Inpatient   Certification Statement: The patient will continue to require additional inpatient hospital stay due to pending stabilization  Discharge  Plan: Anticipate discharge in 48 hrs to home with home services.    Code Status: Level 1 - Full Code    Subjective:   Seen and examined at bedside   Awake, alert and oriented  Comfortable at baseline oxygen  Appears stable and improved      Objective:     Vitals:   Temp (24hrs), Av.9 °F (36.1 °C), Min:95.5 °F (35.3 °C), Max:97.7 °F (36.5 °C)    Temp:  [95.5 °F (35.3 °C)-97.7 °F (36.5 °C)] 96.8 °F (36 °C)  HR:  [] 106  Resp:  [16-22] 22  BP: ()/(48-72) 107/72  SpO2:  [94 %-100 %] 94 %  Body mass index is 47.26 kg/m².     Input and Output Summary (last 24 hours):     Intake/Output Summary (Last 24 hours) at 3/4/2024 1600  Last data filed at 3/4/2024 1310  Gross per 24 hour   Intake 2762 ml   Output 8100 ml   Net -5338 ml       Physical Exam:   Physical Exam  Vitals reviewed.   Constitutional:       Appearance: She is obese.   HENT:      Head: Atraumatic.   Eyes:      General: No scleral icterus.  Cardiovascular:      Rate and Rhythm: Normal rate.      Heart sounds: Normal heart sounds.   Pulmonary:      Comments: 4 L of oxygen  Diminished breath sounds bilaterally  Abdominal:      General: Bowel sounds are normal.   Musculoskeletal:      Right lower leg: Edema present.      Left lower leg: Edema present.   Skin:     General: Skin is dry.   Neurological:      Mental Status: She is alert. Mental status is at baseline.   Psychiatric:         Mood and Affect: Mood normal.          Additional Data:     Labs:  Results from last 7 days   Lab Units 24  0413 24  0229 24  0441   WBC Thousand/uL 11.54*   < > 11.04*   HEMOGLOBIN g/dL 10.4*   < > 9.3*   HEMATOCRIT % 37.5   < > 33.3*   PLATELETS Thousands/uL 335   < > 263   NEUTROS PCT %  --   --  71   LYMPHS PCT %  --   --  19   MONOS PCT %  --   --  9   EOS PCT %  --   --  0    < > = values in this interval not displayed.     Results from last 7 days   Lab Units 24  0413   SODIUM mmol/L 136   POTASSIUM mmol/L 3.8   CHLORIDE mmol/L 92*   CO2  mmol/L 34*   BUN mg/dL 20   CREATININE mg/dL 0.69   ANION GAP mmol/L 10   CALCIUM mg/dL 8.9   ALBUMIN g/dL 4.1   TOTAL BILIRUBIN mg/dL 0.39   ALK PHOS U/L 133*   ALT U/L 11   AST U/L 15   GLUCOSE RANDOM mg/dL 105         Results from last 7 days   Lab Units 03/04/24  1124 03/04/24  0743 03/03/24  2141 03/03/24  1605 03/03/24  1056 03/03/24  0721 03/02/24  2007 03/02/24  1656 03/02/24  1106 03/02/24  0700 03/01/24  2059 03/01/24  1706   POC GLUCOSE mg/dl 176* 140 112 154* 191* 104 120 88 184* 117 123 89         Results from last 7 days   Lab Units 02/26/24  1648   LACTIC ACID mmol/L 0.8       Lines/Drains:  Invasive Devices       Peripheral Intravenous Line  Duration             Peripheral IV 03/01/24 Left;Ventral (anterior) Forearm 2 days                      Telemetry:  Telemetry Orders (From admission, onward)               24 Hour Telemetry Monitoring  Continuous x 24 Hours (Telem)        Question:  Reason for 24 Hour Telemetry  Answer:  Decompensated CHF- and any one of the following: continuous diuretic infusion or total diuretic dose >200 mg daily, associated electrolyte derangement (I.e. K < 3.0), ionotropic drip (continuous infusion), hx of ventricular arrhythmia, or new EF < 35%                     Telemetry Reviewed: Normal Sinus Rhythm  Indication for Continued Telemetry Use: Acute CHF on >200 mg lasix/day or equivalent dose or with new reduced EF.              Imaging: Reviewed radiology reports from this admission including: chest xray    Recent Cultures (last 7 days):   Results from last 7 days   Lab Units 02/26/24  2120   URINE CULTURE  40,000-49,000 cfu/ml       Last 24 Hours Medication List:   Current Facility-Administered Medications   Medication Dose Route Frequency Provider Last Rate    acetaminophen  650 mg Oral Q6H PRN Rosamaria Miller PA-C      albuterol  2 puff Inhalation Q4H PRN Magdalena Fleming MD      albuterol  2.5 mg Nebulization Q6H PRN Magdalena Fleming MD      apixaban  5 mg Oral  BID Magdalena Fleming MD      atorvastatin  40 mg Oral Daily Magdalena Fleming MD      budesonide  0.5 mg Nebulization Q12H Magdalena Fleming MD      bumetanide (BUMEX) 12.5 mg infusion 50 mL  0.5 mg/hr Intravenous Continuous Chuck Harrell PA-C      DULoxetine  60 mg Oral Daily Magdalena Fleming MD      Empagliflozin  10 mg Oral Daily Magdalena Fleming MD      fluticasone  1 spray Nasal BID Magdalena Fleming MD      insulin glargine  45 Units Subcutaneous HS Magdalena Fleming MD      insulin lispro  1-5 Units Subcutaneous HS Magdalena Fleming MD      insulin lispro  2-12 Units Subcutaneous TID AC Magdalena Fleming MD      insulin lispro  15 Units Subcutaneous TID With Meals Magdalena Fleming MD      lidocaine  1 patch Topical Daily Magdalena Fleming MD      loratadine  10 mg Oral Daily PRN Magdalena Fleming MD      LORazepam  0.5 mg Oral Daily PRN Magdalena Fleming MD      metoprolol succinate  100 mg Oral QAM Fran John PA-C      metoprolol succinate  50 mg Oral QPM Fran John PA-C      montelukast  10 mg Oral HS Magdalena Fleming MD      nystatin   Topical BID Magdalena Fleming MD      pantoprazole  40 mg Oral Early Morning Magdalena Fleming MD      spironolactone  100 mg Oral Daily Magdalena Fleming MD      traZODone  150 mg Oral HS Magdalena Fleming MD          Today, Patient Was Seen By: Hawa Marsh MD    **Please Note: This note may have been constructed using a voice recognition system.**

## 2024-03-04 NOTE — ASSESSMENT & PLAN NOTE
Wt Readings from Last 3 Encounters:   03/04/24 121 kg (266 lb 12.8 oz)   02/26/24 127 kg (279 lb)   02/26/24 127 kg (279 lb)     Seen in the cardiology office 2/26  Approximately 15 pounds weight gain in the past 2 weeks.  Noncompliant with salt diet     patient is obese  Chest x-ray with congestion  Last echo August 2023 showed LVEF 65% diastolic dysfunction 2, R VSP 61 mmHg  On Bumex drip, had  7.6 L urine output/24 hours   Noncompliant with fluids restriction, drinks water from the sink, per nurse     Plan:   Cardiology following, appreciate recommendations  Plan to discontinue Bumex drip today and start p.o. Bumex in a.m.   Strict intake output, Daily weights  Low-salt diet  Continue metoprolol and Jardiance  continue Aldactone 100 mg

## 2024-03-04 NOTE — PLAN OF CARE
Problem: PAIN - ADULT  Goal: Verbalizes/displays adequate comfort level or baseline comfort level  Description: Interventions:  - Encourage patient to monitor pain and request assistance  - Assess pain using appropriate pain scale  - Administer analgesics based on type and severity of pain and evaluate response  - Implement non-pharmacological measures as appropriate and evaluate response  - Consider cultural and social influences on pain and pain management  - Notify physician/advanced practitioner if interventions unsuccessful or patient reports new pain  Outcome: Progressing     Problem: SAFETY ADULT  Goal: Patient will remain free of falls  Description: INTERVENTIONS:  - Educate patient/family on patient safety including physical limitations  - Instruct patient to call for assistance with activity   - Consult OT/PT to assist with strengthening/mobility   - Keep Call bell within reach  - Keep bed low and locked with side rails adjusted as appropriate  - Keep care items and personal belongings within reach  - Initiate and maintain comfort rounds  - Make Fall Risk Sign visible to staff  - Obtain necessary fall risk management equipment: non-slip socks  - Apply yellow socks and bracelet for high fall risk patients  - Consider moving patient to room near nurses station  Outcome: Progressing

## 2024-03-04 NOTE — ASSESSMENT & PLAN NOTE
Lab Results   Component Value Date    HGBA1C 8.5 (H) 01/22/2024       Recent Labs     03/03/24  1605 03/03/24  2141 03/04/24  0743 03/04/24  1124   POCGLU 154* 112 140 176*       Blood Sugar Average: Last 72 hrs:  (P) 133.1527685545300618  On Lantus 55 units at bedtime and Apidra 25 units 3 times daily with meals.    Continue with Lantus 45 units at bedtime, Humalog  15 units 3 times daily with meals and sliding scale  Diabetic diet  Frequent Accu-Cheks and hypoglycemia protocol

## 2024-03-04 NOTE — PROGRESS NOTES
Progress Note - Cardiology   Mattie Joy 63 y.o. female MRN: [id number]  Unit/Bed#: -01 Encounter: [id number]        Problem List:  Principal Problem:    Acute on chronic respiratory failure (HCC)  Active Problems:    Type 2 diabetes mellitus with stage 2 chronic kidney disease, with long-term current use of insulin (HCC)    Paroxysmal atrial fibrillation (HCC)    Current moderate episode of major depressive disorder without prior episode (HCC)    Morbid obesity (HCC)    COPD, severe (HCC)    Acute on chronic diastolic CHF (congestive heart failure) (Formerly [facility])      Assessment:  Acute on chronic diastolic heart failure  8/15/2023 echo: LVEF 65%, grade 2 diastolic dysfunction, severely elevated RV systolic pressure 61 mmHg  Current diuretic: Bumex 5 BID  Wt as low as 265 in the hospital February 2024  Persistent atrial fibrillation  Thromboembolic PPx: Eliquis 5 twice daily  Rate control: Toprol- daily  COPD with acute exacerbation  Type 2 diabetes  Obesity, BMI 51  Pulmonary hypertension likely group 2/3 from left heart disease, underlying lung disease, OHV    -Down to 261 lbs standing and net negative reported -31 liters!  -Renal function and electrolytes are stable, though potassium is borderline  -Remains in AF with rates trending in low 100's    Plan/ Discussion:  She looks better today than she has in months  She is AAOx3 with oxygen saturations in the upper 90s on 4 L nasal cannula which is her baseline  Continue Bumex drip today and change to oral tomorrow 6 mg twice daily  Observe for 24 hours after initiating oral diuretics to ensure stability  She tells me that she is well aware that some of her habits at home are what led to her readmission. She tells me that she knows she has to follow a low-salt diet and plans to do so. We will arrange close outpatient follow-up in our office to assist her    Subjective:  Feeling fine today  Breathing is stable  No acute complaints  Hopeful to go home  soon    Vitals:  Vitals:    03/03/24 0100 03/04/24 0424   Weight: 122 kg (268 lb 8 oz) 121 kg (266 lb 12.8 oz)   ,  Vitals:    03/04/24 0424 03/04/24 0722 03/04/24 0758 03/04/24 0819   BP:  (!) 92/48  112/72   BP Location:  Right arm     Pulse:  90  (!) 112   Resp:  16     Temp:  (!) 95.5 °F (35.3 °C)     TempSrc:  Axillary     SpO2:  100% 97% 94%   Weight: 121 kg (266 lb 12.8 oz)      Height:           Exam:  General: Alert awake and oriented, no acute distress  Heart:  Regular rate and rhythm, no murmurs, Normal S1, no edema    Respiratory effort/ Lungs:  Breathing comfortably on 4 L nasal cannula, clear bilaterally without wheezing, rales, crackles   Abdominal: Non-tender to palpation, + bowel sounds, soft, no masses or distension  Lower Limbs:  No edema            Telemetry:       Atrial fibrillation, Heart Rate     Medications:    Current Facility-Administered Medications:     acetaminophen (TYLENOL) tablet 650 mg, 650 mg, Oral, Q6H PRN, Rosamaria Miller PA-C, 650 mg at 03/04/24 0823    albuterol (PROVENTIL HFA,VENTOLIN HFA) inhaler 2 puff, 2 puff, Inhalation, Q4H PRN, Magdalena Fleming MD, 2 puff at 02/26/24 1822    albuterol inhalation solution 2.5 mg, 2.5 mg, Nebulization, Q6H PRN, Magdalena Fleming MD    apixaban (ELIQUIS) tablet 5 mg, 5 mg, Oral, BID, Magdalena Fleming MD, 5 mg at 03/04/24 0815    atorvastatin (LIPITOR) tablet 40 mg, 40 mg, Oral, Daily, Magdalena Fleming MD, 40 mg at 03/04/24 0815    budesonide (PULMICORT) inhalation solution 0.5 mg, 0.5 mg, Nebulization, Q12H, Magdalena Fleming MD, 0.5 mg at 03/04/24 0758    bumetanide (BUMEX) 12.5 mg infusion 50 mL, 0.5 mg/hr, Intravenous, Continuous, Magdalena Fleming MD, Last Rate: 2 mL/hr at 03/03/24 1841, 0.5 mg/hr at 03/03/24 1841    DULoxetine (CYMBALTA) delayed release capsule 60 mg, 60 mg, Oral, Daily, Magdalena Fleming MD, 60 mg at 03/04/24 0814    Empagliflozin (JARDIANCE) tablet 10 mg, 10 mg, Oral, Daily, Magdalena  MD Lonnie, 10 mg at 03/04/24 0821    fluticasone (FLONASE) 50 mcg/act nasal spray 1 spray, 1 spray, Nasal, BID, Magdalena Fleming MD, 1 spray at 03/04/24 0824    insulin glargine (LANTUS) subcutaneous injection 45 Units 0.45 mL, 45 Units, Subcutaneous, HS, Magdalena Fleming MD, 45 Units at 03/03/24 2149    insulin lispro (HumaLOG) 100 units/mL subcutaneous injection 1-5 Units, 1-5 Units, Subcutaneous, HS, Magdalena Fleming MD, 2 Units at 02/28/24 2057    insulin lispro (HumaLOG) 100 units/mL subcutaneous injection 2-12 Units, 2-12 Units, Subcutaneous, TID AC, 2 Units at 03/03/24 1757 **AND** Fingerstick Glucose (POCT), , , TID AC, Magdalena Fleming MD    insulin lispro (HumALOG/ADMELOG) 100 units/mL subcutaneous injection 15 Units, 15 Units, Subcutaneous, TID With Meals, Magdalena Fleming MD, 15 Units at 03/04/24 0820    lidocaine (LIDODERM) 5 % patch 1 patch, 1 patch, Topical, Daily, Magdalena Fleming MD, 1 patch at 03/04/24 0820    loratadine (CLARITIN) tablet 10 mg, 10 mg, Oral, Daily PRN, Magdalena Fleming MD    LORazepam (ATIVAN) tablet 0.5 mg, 0.5 mg, Oral, Daily PRN, Magdalena Fleming MD    metoprolol succinate (TOPROL-XL) 24 hr tablet 100 mg, 100 mg, Oral, QAM, Fran John PA-C, 100 mg at 03/04/24 0814    metoprolol succinate (TOPROL-XL) 24 hr tablet 50 mg, 50 mg, Oral, QPM, Fran John PA-C, 50 mg at 03/03/24 1758    montelukast (SINGULAIR) tablet 10 mg, 10 mg, Oral, HS, Magdalena Fleming MD, 10 mg at 03/03/24 2149    nystatin (MYCOSTATIN) powder, , Topical, BID, Magdalena Fleming MD, Given at 03/03/24 1758    pantoprazole (PROTONIX) EC tablet 40 mg, 40 mg, Oral, Early Morning, Magdalena Fleming MD, 40 mg at 03/04/24 0524    spironolactone (ALDACTONE) tablet 100 mg, 100 mg, Oral, Daily, Magdalena Fleming MD, 100 mg at 03/04/24 0814    traZODone (DESYREL) tablet 150 mg, 150 mg, Oral, HS, Magdalena Fleming MD, 150 mg at 03/03/24 0597      Labs/Data:         Results from last 7 days   Lab Units 03/04/24  0413 03/03/24 0217 03/02/24  0504   WBC Thousand/uL 11.54* 14.47* 11.25*   HEMOGLOBIN g/dL 10.4* 10.5* 10.7*   HEMATOCRIT % 37.5 38.2 37.9   PLATELETS Thousands/uL 335 337 335     Results from last 7 days   Lab Units 03/04/24  0413 03/03/24 0217 03/02/24  0504   POTASSIUM mmol/L 3.8 3.5 3.7   CHLORIDE mmol/L 92* 92* 93*   CO2 mmol/L 34* 36* 38*   BUN mg/dL 20 22 17   CREATININE mg/dL 0.69 0.70 0.66

## 2024-03-05 LAB
ANION GAP SERPL CALCULATED.3IONS-SCNC: 6 MMOL/L
BASOPHILS # BLD AUTO: 0.03 THOUSANDS/ÂΜL (ref 0–0.1)
BASOPHILS NFR BLD AUTO: 0 % (ref 0–1)
BUN SERPL-MCNC: 26 MG/DL (ref 5–25)
CALCIUM SERPL-MCNC: 9 MG/DL (ref 8.4–10.2)
CHLORIDE SERPL-SCNC: 89 MMOL/L (ref 96–108)
CO2 SERPL-SCNC: 40 MMOL/L (ref 21–32)
CREAT SERPL-MCNC: 0.73 MG/DL (ref 0.6–1.3)
EOSINOPHIL # BLD AUTO: 0 THOUSAND/ÂΜL (ref 0–0.61)
EOSINOPHIL NFR BLD AUTO: 0 % (ref 0–6)
ERYTHROCYTE [DISTWIDTH] IN BLOOD BY AUTOMATED COUNT: 19 % (ref 11.6–15.1)
GFR SERPL CREATININE-BSD FRML MDRD: 86 ML/MIN/1.73SQ M
GLUCOSE SERPL-MCNC: 134 MG/DL (ref 65–140)
GLUCOSE SERPL-MCNC: 159 MG/DL (ref 65–140)
GLUCOSE SERPL-MCNC: 170 MG/DL (ref 65–140)
GLUCOSE SERPL-MCNC: 226 MG/DL (ref 65–140)
GLUCOSE SERPL-MCNC: 285 MG/DL (ref 65–140)
HCT VFR BLD AUTO: 37.3 % (ref 34.8–46.1)
HGB BLD-MCNC: 10.5 G/DL (ref 11.5–15.4)
IMM GRANULOCYTES # BLD AUTO: 0.08 THOUSAND/UL (ref 0–0.2)
IMM GRANULOCYTES NFR BLD AUTO: 1 % (ref 0–2)
LYMPHOCYTES # BLD AUTO: 2.17 THOUSANDS/ÂΜL (ref 0.6–4.47)
LYMPHOCYTES NFR BLD AUTO: 19 % (ref 14–44)
MAGNESIUM SERPL-MCNC: 2.5 MG/DL (ref 1.9–2.7)
MCH RBC QN AUTO: 18.8 PG (ref 26.8–34.3)
MCHC RBC AUTO-ENTMCNC: 28.2 G/DL (ref 31.4–37.4)
MCV RBC AUTO: 67 FL (ref 82–98)
MONOCYTES # BLD AUTO: 1.07 THOUSAND/ÂΜL (ref 0.17–1.22)
MONOCYTES NFR BLD AUTO: 9 % (ref 4–12)
NEUTROPHILS # BLD AUTO: 8.14 THOUSANDS/ÂΜL (ref 1.85–7.62)
NEUTS SEG NFR BLD AUTO: 71 % (ref 43–75)
NRBC BLD AUTO-RTO: 0 /100 WBCS
PLATELET # BLD AUTO: 337 THOUSANDS/UL (ref 149–390)
PMV BLD AUTO: 11.3 FL (ref 8.9–12.7)
POTASSIUM SERPL-SCNC: 4.2 MMOL/L (ref 3.5–5.3)
RBC # BLD AUTO: 5.6 MILLION/UL (ref 3.81–5.12)
SODIUM SERPL-SCNC: 135 MMOL/L (ref 135–147)
WBC # BLD AUTO: 11.49 THOUSAND/UL (ref 4.31–10.16)

## 2024-03-05 PROCEDURE — 85025 COMPLETE CBC W/AUTO DIFF WBC: CPT | Performed by: INTERNAL MEDICINE

## 2024-03-05 PROCEDURE — 94660 CPAP INITIATION&MGMT: CPT

## 2024-03-05 PROCEDURE — 82948 REAGENT STRIP/BLOOD GLUCOSE: CPT

## 2024-03-05 PROCEDURE — 80048 BASIC METABOLIC PNL TOTAL CA: CPT | Performed by: INTERNAL MEDICINE

## 2024-03-05 PROCEDURE — 83735 ASSAY OF MAGNESIUM: CPT | Performed by: INTERNAL MEDICINE

## 2024-03-05 PROCEDURE — 94640 AIRWAY INHALATION TREATMENT: CPT

## 2024-03-05 PROCEDURE — 99232 SBSQ HOSP IP/OBS MODERATE 35: CPT | Performed by: INTERNAL MEDICINE

## 2024-03-05 PROCEDURE — 94760 N-INVAS EAR/PLS OXIMETRY 1: CPT

## 2024-03-05 RX ORDER — BUMETANIDE 1 MG/1
6 TABLET ORAL 2 TIMES DAILY
Status: DISCONTINUED | OUTPATIENT
Start: 2024-03-05 | End: 2024-03-06 | Stop reason: HOSPADM

## 2024-03-05 RX ORDER — SPIRONOLACTONE 25 MG/1
100 TABLET ORAL DAILY
Status: DISCONTINUED | OUTPATIENT
Start: 2024-03-05 | End: 2024-03-06 | Stop reason: HOSPADM

## 2024-03-05 RX ORDER — BUMETANIDE 1 MG/1
6 TABLET ORAL 2 TIMES DAILY
Status: DISCONTINUED | OUTPATIENT
Start: 2024-03-05 | End: 2024-03-05

## 2024-03-05 RX ADMIN — SPIRONOLACTONE 100 MG: 25 TABLET ORAL at 09:10

## 2024-03-05 RX ADMIN — NYSTATIN: 100000 POWDER TOPICAL at 16:46

## 2024-03-05 RX ADMIN — TRAZODONE HYDROCHLORIDE 150 MG: 150 TABLET ORAL at 21:04

## 2024-03-05 RX ADMIN — ATORVASTATIN CALCIUM 40 MG: 40 TABLET, FILM COATED ORAL at 09:11

## 2024-03-05 RX ADMIN — APIXABAN 5 MG: 5 TABLET, FILM COATED ORAL at 09:12

## 2024-03-05 RX ADMIN — INSULIN LISPRO 15 UNITS: 100 INJECTION, SOLUTION INTRAVENOUS; SUBCUTANEOUS at 09:07

## 2024-03-05 RX ADMIN — MONTELUKAST 10 MG: 10 TABLET, FILM COATED ORAL at 21:04

## 2024-03-05 RX ADMIN — APIXABAN 5 MG: 5 TABLET, FILM COATED ORAL at 16:41

## 2024-03-05 RX ADMIN — INSULIN LISPRO 4 UNITS: 100 INJECTION, SOLUTION INTRAVENOUS; SUBCUTANEOUS at 17:34

## 2024-03-05 RX ADMIN — EMPAGLIFLOZIN 10 MG: 10 TABLET, FILM COATED ORAL at 09:17

## 2024-03-05 RX ADMIN — BUDESONIDE 0.5 MG: 0.5 INHALANT ORAL at 07:44

## 2024-03-05 RX ADMIN — INSULIN LISPRO 2 UNITS: 100 INJECTION, SOLUTION INTRAVENOUS; SUBCUTANEOUS at 09:07

## 2024-03-05 RX ADMIN — BUMETANIDE 6 MG: 1 TABLET ORAL at 09:17

## 2024-03-05 RX ADMIN — DULOXETINE HYDROCHLORIDE 60 MG: 30 CAPSULE, DELAYED RELEASE ORAL at 09:12

## 2024-03-05 RX ADMIN — INSULIN GLARGINE 45 UNITS: 100 INJECTION, SOLUTION SUBCUTANEOUS at 21:03

## 2024-03-05 RX ADMIN — BUDESONIDE 0.5 MG: 0.5 INHALANT ORAL at 19:22

## 2024-03-05 RX ADMIN — INSULIN LISPRO 1 UNITS: 100 INJECTION, SOLUTION INTRAVENOUS; SUBCUTANEOUS at 21:02

## 2024-03-05 RX ADMIN — INSULIN LISPRO 15 UNITS: 100 INJECTION, SOLUTION INTRAVENOUS; SUBCUTANEOUS at 17:33

## 2024-03-05 RX ADMIN — ACETAMINOPHEN 650 MG: 325 TABLET, FILM COATED ORAL at 16:45

## 2024-03-05 RX ADMIN — FLUTICASONE PROPIONATE 1 SPRAY: 50 SPRAY, METERED NASAL at 09:20

## 2024-03-05 RX ADMIN — INSULIN LISPRO 15 UNITS: 100 INJECTION, SOLUTION INTRAVENOUS; SUBCUTANEOUS at 12:34

## 2024-03-05 RX ADMIN — PANTOPRAZOLE SODIUM 40 MG: 40 TABLET, DELAYED RELEASE ORAL at 05:28

## 2024-03-05 RX ADMIN — METOPROLOL SUCCINATE 100 MG: 50 TABLET, EXTENDED RELEASE ORAL at 09:19

## 2024-03-05 RX ADMIN — BUMETANIDE 6 MG: 1 TABLET ORAL at 16:41

## 2024-03-05 RX ADMIN — LIDOCAINE 5% 1 PATCH: 700 PATCH TOPICAL at 09:20

## 2024-03-05 RX ADMIN — INSULIN LISPRO 6 UNITS: 100 INJECTION, SOLUTION INTRAVENOUS; SUBCUTANEOUS at 12:34

## 2024-03-05 RX ADMIN — METOPROLOL SUCCINATE 50 MG: 50 TABLET, EXTENDED RELEASE ORAL at 16:45

## 2024-03-05 NOTE — ASSESSMENT & PLAN NOTE
Lab Results   Component Value Date    HGBA1C 8.5 (H) 01/22/2024       Recent Labs     03/05/24  1137 03/05/24  1627 03/05/24  2101 03/06/24  0755   POCGLU 285* 226* 159* 191*       Blood Sugar Average: Last 72 hrs:  (P) [id number]  On Lantus 55 units at bedtime and Apidra 25 units 3 times daily with meals.    Continue with Lantus 45 units at bedtime, Humalog  15 units 3 times daily with meals and sliding scale  Diabetic diet  Outpatient endocrinology follow-up

## 2024-03-05 NOTE — PROGRESS NOTES
[facility]  Progress Note  Name: Mattie Joy I  MRN: [id number]  Unit/Bed#: -01 I Date of Admission: 2/26/2024   Date of Service: 3/5/2024 I Hospital Day: 8    Assessment/Plan   Acute on chronic diastolic CHF (congestive heart failure) (HCC)  Assessment & Plan  Wt Readings from Last 3 Encounters:   03/05/24 120 kg (264 lb 9.6 oz)   02/26/24 127 kg (279 lb)   02/26/24 127 kg (279 lb)     Seen in the cardiology office 2/26  Approximately 15 pounds weight gain in the past 2 weeks.  Noncompliant with salt diet     patient is obese  Chest x-ray with congestion  Last echo August 2023 showed LVEF 65% diastolic dysfunction 2, R VSP 61 mmHg  Previously on Bumex drip, had  7.6 L urine output/24 hours , discontinued 3/5  Noncompliant with fluids restriction, drinks water from the sink, per nurse     Plan:   Cardiology following, appreciate recommendations  Transition to p.o. Bumex 6 mg twice daily 3/5  Strict intake output, Daily weights  Low-salt diet  Continue metoprolol and Jardiance  continue Aldactone 100 mg                   COPD, severe (HCC)  Assessment & Plan  Followed with pulmonology.  On albuterol nebulizers as needed, Pulmicort, Claritin, Singulair, bevespi   Noncompliant with medication.  Continue Pulmicort/albuterol, Claritin, Singulair    Not in COPD exacerbation currently    Morbid obesity (HCC)  Assessment & Plan  Lifestyle changes encouraged     Current moderate episode of major depressive disorder without prior episode (HCC)  Assessment & Plan  On Cymbalta, trazodone and Lexapro.  Reported Lexapro was discontinued  Continue Cymbalta and trazodone    Paroxysmal atrial fibrillation (HCC)  Assessment & Plan  On metoprolol 100 mg daily and Eliquis 5 twice daily          Type 2 diabetes mellitus with stage 2 chronic kidney disease, with long-term current use of insulin (HCC)  Assessment & Plan  Lab Results   Component Value Date    HGBA1C 8.5 (H) 01/22/2024        Recent Labs     03/04/24  1725 03/04/24  2107 03/05/24  0636 03/05/24  1137   POCGLU 175* 177* 170* 285*       Blood Sugar Average: Last 72 hrs:  (P) 156.0061041907258351  On Lantus 55 units at bedtime and Apidra 25 units 3 times daily with meals.    Continue with Lantus 45 units at bedtime, Humalog  15 units 3 times daily with meals and sliding scale  Diabetic diet  Frequent Accu-Cheks and hypoglycemia protocol    * Acute on chronic respiratory failure (HCC)  Assessment & Plan  At baseline on 4 L nasal cannula oxygen secondary to severe COPD, pulmonary hypertension  Required up to 7 L nasal cannula oxygen on presentation  Likely decompensated secondary to CHF  Flu/COVID/RSV negative  Monitor with diuresis    Currently saturating fine at 4 L nasal cannula oxygen which is her baseline               VTE Pharmacologic Prophylaxis: VTE Score: 6 Moderate Risk (Score 3-4) - Pharmacological DVT Prophylaxis Ordered: apixaban (Eliquis).    Mobility:   Basic Mobility Inpatient Raw Score: 23  JH-HLM Goal: 7: Walk 25 feet or more  JH-HLM Achieved: 7: Walk 25 feet or more  HLM Goal achieved. Continue to encourage appropriate mobility.    Patient Centered Rounds: I performed bedside rounds with nursing staff today.   Discussions with Specialists or Other Care Team Provider: yes    Education and Discussions with Family / Patient:  yes.     Total Time Spent on Date of Encounter in care of patient: 38 mins. This time was spent on one or more of the following: performing physical exam; counseling and coordination of care; obtaining or reviewing history; documenting in the medical record; reviewing/ordering tests, medications or procedures; communicating with other healthcare professionals and discussing with patient's family/caregivers.    Current Length of Stay: 8 day(s)  Current Patient Status: Inpatient   Certification Statement: The patient will continue to require additional inpatient hospital stay due to pending  stabilization  Discharge Plan: Anticipate discharge in 48 hrs to home with home services.    Code Status: Level 1 - Full Code    Subjective:     Seen and examined at bedside  Denies chest pain or shortness of breath  Denies palpitations  Wearing CPAP  No overnight events reported      Objective:     Vitals:   Temp (24hrs), Av.5 °F (36.4 °C), Min:96.4 °F (35.8 °C), Max:98.7 °F (37.1 °C)    Temp:  [96.4 °F (35.8 °C)-98.7 °F (37.1 °C)] 98.7 °F (37.1 °C)  HR:  [] 112  Resp:  [16-22] 18  BP: ()/(56-72) 109/61  SpO2:  [94 %-98 %] 96 %  Body mass index is 46.87 kg/m².     Input and Output Summary (last 24 hours):     Intake/Output Summary (Last 24 hours) at 3/5/2024 1421  Last data filed at 3/5/2024 1249  Gross per 24 hour   Intake 2140 ml   Output 3800 ml   Net -1660 ml       Physical Exam:   Physical Exam  Vitals reviewed.   Constitutional:       Appearance: She is obese.   HENT:      Head: Atraumatic.      Mouth/Throat:      Mouth: Mucous membranes are dry.   Eyes:      General: No scleral icterus.  Cardiovascular:      Rate and Rhythm: Normal rate.      Heart sounds: Normal heart sounds.   Pulmonary:      Comments: 4 L of oxygen  Abdominal:      General: Bowel sounds are normal.   Musculoskeletal:      Right lower leg: Edema present.      Left lower leg: Edema present.   Skin:     General: Skin is dry.   Neurological:      Mental Status: She is alert and oriented to person, place, and time. Mental status is at baseline.   Psychiatric:         Mood and Affect: Mood normal.          Additional Data:     Labs:  Results from last 7 days   Lab Units 24  0317   WBC Thousand/uL 11.49*   HEMOGLOBIN g/dL 10.5*   HEMATOCRIT % 37.3   PLATELETS Thousands/uL 337   NEUTROS PCT % 71   LYMPHS PCT % 19   MONOS PCT % 9   EOS PCT % 0     Results from last 7 days   Lab Units 24  0317 24  0413   SODIUM mmol/L 135 136   POTASSIUM mmol/L 4.2 3.8   CHLORIDE mmol/L 89* 92*   CO2 mmol/L 40* 34*   BUN mg/dL 26*  20   CREATININE mg/dL 0.73 0.69   ANION GAP mmol/L 6 10   CALCIUM mg/dL 9.0 8.9   ALBUMIN g/dL  --  4.1   TOTAL BILIRUBIN mg/dL  --  0.39   ALK PHOS U/L  --  133*   ALT U/L  --  11   AST U/L  --  15   GLUCOSE RANDOM mg/dL 134 105         Results from last 7 days   Lab Units 03/05/24  1137 03/05/24  0636 03/04/24  2107 03/04/24  1725 03/04/24  1124 03/04/24  0743 03/03/24  2141 03/03/24  1605 03/03/24  1056 03/03/24  0721 03/02/24 2007 03/02/24  1656   POC GLUCOSE mg/dl 285* 170* 177* 175* 176* 140 112 154* 191* 104 120 88                 Lines/Drains:  Invasive Devices       Peripheral Intravenous Line  Duration             Peripheral IV 03/05/24 Distal;Right;Ventral (anterior) Forearm <1 day                      Telemetry:  Telemetry Orders (From admission, onward)               24 Hour Telemetry Monitoring  Continuous x 24 Hours (Telem)        Question:  Reason for 24 Hour Telemetry  Answer:  Decompensated CHF- and any one of the following: continuous diuretic infusion or total diuretic dose >200 mg daily, associated electrolyte derangement (I.e. K < 3.0), ionotropic drip (continuous infusion), hx of ventricular arrhythmia, or new EF < 35%                     Telemetry Reviewed: Normal Sinus Rhythm  Indication for Continued Telemetry Use: Acute CHF on >200 mg lasix/day or equivalent dose or with new reduced EF.              Imaging: Reviewed radiology reports from this admission including: chest xray    Recent Cultures (last 7 days):           Last 24 Hours Medication List:   Current Facility-Administered Medications   Medication Dose Route Frequency Provider Last Rate    acetaminophen  650 mg Oral Q6H PRN Rosamaria Miller PA-C      albuterol  2 puff Inhalation Q4H PRN Magdalena Fleming MD      albuterol  2.5 mg Nebulization Q6H PRN aMgdalena Fleming MD      apixaban  5 mg Oral BID Magdalena Fleming MD      atorvastatin  40 mg Oral Daily Magdalena Fleming MD      budesonide  0.5 mg Nebulization Q12H Magdalena  MD Lonnie      bumetanide  6 mg Oral BID Chuck Harrell PA-C      DULoxetine  60 mg Oral Daily Magdalena Fleming MD      Empagliflozin  10 mg Oral Daily Magdalena Fleming MD      fluticasone  1 spray Nasal BID Magdalena Fleming MD      insulin glargine  45 Units Subcutaneous HS Magdalena Fleming MD      insulin lispro  1-5 Units Subcutaneous HS Magdalena Fleming MD      insulin lispro  2-12 Units Subcutaneous TID AC Magdalena Fleming MD      insulin lispro  15 Units Subcutaneous TID With Meals Magdalena Fleming MD      lidocaine  1 patch Topical Daily Magdalena Fleming MD      loratadine  10 mg Oral Daily PRN Magdalena Fleming MD      LORazepam  0.5 mg Oral Daily PRN Magdalena Fleming MD      metoprolol succinate  100 mg Oral QAM Fran John PA-C      metoprolol succinate  50 mg Oral QPM Fran John PA-C      montelukast  10 mg Oral HS Magdalena Fleming MD      nystatin   Topical BID Magdalena Fleming MD      pantoprazole  40 mg Oral Early Morning Magdalena Fleming MD      spironolactone  100 mg Oral Daily Chuck Harrell PA-C      traZODone  150 mg Oral HS Magdalena Fleming MD          Today, Patient Was Seen By: Hawa Marsh MD    **Please Note: This note may have been constructed using a voice recognition system.**

## 2024-03-05 NOTE — PLAN OF CARE
Problem: Potential for Falls  Goal: Patient will remain free of falls  Description: INTERVENTIONS:  - Educate patient/family on patient safety including physical limitations  - Instruct patient to call for assistance with activity   - Consult OT/PT to assist with strengthening/mobility   - Keep Call bell within reach  - Keep bed low and locked with side rails adjusted as appropriate  - Keep care items and personal belongings within reach  - Initiate and maintain comfort rounds  - Make Fall Risk Sign visible to staff  - Obtain necessary fall risk management equipment: non-slip socks  - Apply yellow socks and bracelet for high fall risk patients  - Consider moving patient to room near nurses station  Outcome: Progressing     Problem: PAIN - ADULT  Goal: Verbalizes/displays adequate comfort level or baseline comfort level  Description: Interventions:  - Encourage patient to monitor pain and request assistance  - Assess pain using appropriate pain scale  - Administer analgesics based on type and severity of pain and evaluate response  - Implement non-pharmacological measures as appropriate and evaluate response  - Consider cultural and social influences on pain and pain management  - Notify physician/advanced practitioner if interventions unsuccessful or patient reports new pain  Outcome: Progressing     Problem: INFECTION - ADULT  Goal: Absence or prevention of progression during hospitalization  Description: INTERVENTIONS:  - Assess and monitor for signs and symptoms of infection  - Monitor lab/diagnostic results  - Monitor all insertion sites, i.e. indwelling lines, tubes, and drains  - Monitor endotracheal if appropriate and nasal secretions for changes in amount and color  - [place] appropriate cooling/warming therapies per order  - Administer medications as ordered  - Instruct and encourage patient and family to use good hand hygiene technique  - Identify and instruct in appropriate isolation precautions for  identified infection/condition  Outcome: Progressing     Problem: SAFETY ADULT  Goal: Patient will remain free of falls  Description: INTERVENTIONS:  - Educate patient/family on patient safety including physical limitations  - Instruct patient to call for assistance with activity   - Consult OT/PT to assist with strengthening/mobility   - Keep Call bell within reach  - Keep bed low and locked with side rails adjusted as appropriate  - Keep care items and personal belongings within reach  - Initiate and maintain comfort rounds  - Make Fall Risk Sign visible to staff  - Obtain necessary fall risk management equipment: non-slip socks  - Apply yellow socks and bracelet for high fall risk patients  - Consider moving patient to room near nurses station  Outcome: Progressing  Goal: Maintain or return to baseline ADL function  Description: INTERVENTIONS:  - Educate patient/family on patient safety including physical limitations  - Instruct patient to call for assistance with activity   - Consult OT/PT to assist with strengthening/mobility   - Keep Call bell within reach  - Keep bed low and locked with side rails adjusted as appropriate  - Keep care items and personal belongings within reach  - Initiate and maintain comfort rounds  - Make Fall Risk Sign visible to staff  - Obtain necessary fall risk management equipment: non-slip socks  - Apply yellow socks and bracelet for high fall risk patients  - Consider moving patient to room near nurses station  Outcome: Progressing  Goal: Maintains/Returns to pre admission functional level  Description: INTERVENTIONS:  - Perform AM-PAC 6 Click Basic Mobility/ Daily Activity assessment daily.  - Set and communicate daily mobility goal to care team and patient/family/caregiver.   - Collaborate with rehabilitation services on mobility goals if consulted  - Perform Range of Motion 2 times a day.  - Reposition patient every 2 hours.  - Dangle patient 2 times a day  - Stand patient 2 times  a day  - Ambulate patient 2 times a day  - Out of bed to chair 2 times a day   - Out of bed for meals 2 times a day  - Out of bed for toileting  - Record patient progress and toleration of activity level   Outcome: Progressing     Problem: DISCHARGE PLANNING  Goal: Discharge to home or other facility with appropriate resources  Description: INTERVENTIONS:  - Identify barriers to discharge w/patient and caregiver  - Arrange for needed discharge resources and transportation as appropriate  - Identify discharge learning needs (meds, wound care, etc.)  - Arrange for interpretive services to assist at discharge as needed  - Refer to Case Management Department for coordinating discharge planning if the patient needs post-hospital services based on physician/advanced practitioner order or complex needs related to functional status, cognitive ability, or social support system  Outcome: Progressing     Problem: Knowledge Deficit  Goal: Patient/family/caregiver demonstrates understanding of disease process, treatment plan, medications, and discharge instructions  Description: Complete learning assessment and assess knowledge base.  Interventions:  - Provide teaching at level of understanding  - Provide teaching via preferred learning methods  Outcome: Progressing     Problem: CARDIOVASCULAR - ADULT  Goal: Maintains optimal cardiac output and hemodynamic stability  Description: INTERVENTIONS:  - Monitor I/O, vital signs and rhythm  - Monitor for S/S and trends of decreased cardiac output  - Administer and titrate ordered vasoactive medications to optimize hemodynamic stability  - Assess quality of pulses, skin color and temperature  - Assess for signs of decreased coronary artery perfusion  - Instruct patient to report change in severity of symptoms  Outcome: Progressing  Goal: Absence of cardiac dysrhythmias or at baseline rhythm  Description: INTERVENTIONS:  - Continuous cardiac monitoring, vital signs, obtain 12 lead EKG if  ordered  - Administer antiarrhythmic and heart rate control medications as ordered  - Monitor electrolytes and administer replacement therapy as ordered  Outcome: Progressing     Problem: RESPIRATORY - ADULT  Goal: Achieves optimal ventilation and oxygenation  Description: INTERVENTIONS:  - Assess for changes in respiratory status  - Assess for changes in mentation and behavior  - Position to facilitate oxygenation and minimize respiratory effort  - Oxygen administered by appropriate delivery if ordered  - Initiate smoking cessation education as indicated  - Encourage broncho-pulmonary hygiene including cough, deep breathe, Incentive Spirometry  - Assess the need for suctioning and aspirate as needed  - Assess and instruct to report SOB or any respiratory difficulty  - Respiratory Therapy support as indicated  Outcome: Progressing     Problem: METABOLIC, FLUID AND ELECTROLYTES - ADULT  Goal: Electrolytes maintained within normal limits  Description: INTERVENTIONS:  - Monitor labs and assess patient for signs and symptoms of electrolyte imbalances  - Administer electrolyte replacement as ordered  - Monitor response to electrolyte replacements, including repeat lab results as appropriate  - Instruct patient on fluid and nutrition as appropriate  Outcome: Progressing  Goal: Fluid balance maintained  Description: INTERVENTIONS:  - Monitor labs   - Monitor I/O and WT  - Instruct patient on fluid and nutrition as appropriate  - Assess for signs & symptoms of volume excess or deficit  Outcome: Progressing     Problem: Prexisting or High Potential for Compromised Skin Integrity  Goal: Skin integrity is maintained or improved  Description: INTERVENTIONS:  - Identify patients at risk for skin breakdown  - Assess and monitor skin integrity  - Assess and monitor nutrition and hydration status  - Monitor labs   - Assess for incontinence   - Turn and reposition patient  - Assist with mobility/ambulation  - Relieve pressure over  bony prominences  - Avoid friction and shearing  - Provide appropriate hygiene as needed including keeping skin clean and dry  - Evaluate need for skin moisturizer/barrier cream  - Collaborate with interdisciplinary team   - Patient/family teaching  - Consider wound care consult   Outcome: Progressing

## 2024-03-05 NOTE — CASE MANAGEMENT
Case Management Discharge Planning Note    Patient name Mattie Joy  Location /-01 MRN [id number]  : 1960 Date 3/5/2024       Current Admission Date: 2024  Current Admission Diagnosis:Acute on chronic respiratory failure (HCC)   Patient Active Problem List    Diagnosis Date Noted    Eosinophilic asthma 2023    COPD, severe (HCC) 2023    Hepatic steatosis 2023    Morbid obesity (HCC) 02/15/2023    Diabetic polyneuropathy associated with type 2 diabetes mellitus (HCC) 11/15/2021    Type 2 diabetes mellitus with hyperglycemia (HCC) 2021    Tubular adenoma of colon 2021    Transaminitis 2021    Gastric polyp 2021    Class 3 severe obesity in adult (HCC) 2021    Pulmonary hypertension (HCC) 2020    Insomnia 10/28/2020    Abnormal CT of the chest 2020    Lactic acidosis 2020    Acute on chronic diastolic CHF (congestive heart failure) (HCC) 2020    Microcytic anemia 2020    Neck pain, chronic 2019    Current moderate episode of major depressive disorder without prior episode (HCC) 2019    Paroxysmal atrial fibrillation (HCC) 2018    Severe persistent asthma 2018    Hypokalemia 2018    Acute on chronic respiratory failure (HCC) 2018    Abnormal computed tomography angiography (CTA) of abdomen 2018    Abnormal CT of the abdomen 2018    Iron deficiency anemia due to chronic blood loss 2018    Type 2 diabetes mellitus with stage 2 chronic kidney disease, with long-term current use of insulin (HCC)     Ganglion cyst of flexor tendon sheath 2017    Paresthesia of upper extremity 2017    Cervical radiculopathy 2017    Elevated liver enzymes 2015    Sexual dysfunction 2014    Chronic low back pain 10/15/2014    Hypercholesterolemia 2014    Lumbar radiculopathy 2014    Esophageal reflux 2014    Hypertensive heart and chronic  kidney disease with heart failure and stage 1 through stage 4 chronic kidney disease, or chronic kidney disease (HCC) 03/24/2014    Obstructive sleep apnea 03/09/2014      LOS (days): 8  Geometric Mean LOS (GMLOS) (days): 3.9  Days to GMLOS:-4.1     OBJECTIVE:  Risk of Unplanned Readmission Score: 42.6         Current admission status: Inpatient   Preferred Pharmacy:   [facility] [place] [address]  [address] [address]  Phone: [phone] Fax: [phone]    [facility] - [place], [place] - [facility]  [address]  [address]  [place] [address]  Phone: [phone] Fax: [phone]    Primary Care Provider: Laith Olivares MD    Primary Insurance: MEDICARE  Secondary Insurance: [employer]ET[employer]    DISCHARGE DETAILS:                                       IMM Given (Date):: 03/05/24  IMM Given to:: Patient

## 2024-03-05 NOTE — QUICK NOTE
Patient started on oral diuretic Bumex 6 mg twice daily today which is increased from her prior outpatient dose of 5 mg twice daily. Her standing weight today 264 pounds. We are plan to monitor her for 24 hours on the current dose to make sure her standing weight is stable and her creatinine is stable tomorrow. She is also on 100 mg daily of spironolactone. Will reevaluate tomorrow for potential discharge, after which she will need very close outpatient follow-up (will arrange prior to DC).    Creatinine stable today at 0.7  Potassium is 4.2 and sodium is 135  Standing weight 264 pounds

## 2024-03-05 NOTE — ASSESSMENT & PLAN NOTE
Wt Readings from Last 3 Encounters:   03/06/24 121 kg (266 lb)   02/26/24 127 kg (279 lb)   02/26/24 127 kg (279 lb)     Seen in the cardiology office 2/26  Approximately 15 pounds weight gain in the past 2 weeks.  Noncompliant with salt diet     patient is obese  Chest x-ray with congestion  Last echo August 2023 showed LVEF 65% diastolic dysfunction 2, R VSP 61 mmHg  Previously on Bumex drip, had  7.6 L urine output/24 hours , discontinued 3/5  Noncompliant with fluids restriction, drinks water from the sink, per nurse     Plan:   Outpatient cardiology follow-up  Transition to p.o. Bumex 6 mg twice daily 3/5  Strict intake output, Daily weights  Low-salt diet  Continue metoprolol and Jardiance  continue Aldactone 100 mg

## 2024-03-05 NOTE — PLAN OF CARE
Problem: Potential for Falls  Goal: Patient will remain free of falls  Description: INTERVENTIONS:  - Educate patient/family on patient safety including physical limitations  - Instruct patient to call for assistance with activity   - Consult OT/PT to assist with strengthening/mobility   - Keep Call bell within reach  - Keep bed low and locked with side rails adjusted as appropriate  - Keep care items and personal belongings within reach  - Initiate and maintain comfort rounds  - Make Fall Risk Sign visible to staff  - Obtain necessary fall risk management equipment: non-slip socks  - Apply yellow socks and bracelet for high fall risk patients  - Consider moving patient to room near nurses station  Outcome: Progressing

## 2024-03-06 ENCOUNTER — TRANSITIONAL CARE MANAGEMENT (OUTPATIENT)
Dept: FAMILY MEDICINE CLINIC | Facility: HOSPITAL | Age: 64
End: 2024-03-06

## 2024-03-06 ENCOUNTER — TELEPHONE (OUTPATIENT)
Dept: FAMILY MEDICINE CLINIC | Facility: HOSPITAL | Age: 64
End: 2024-03-06

## 2024-03-06 ENCOUNTER — DOCUMENTATION (OUTPATIENT)
Dept: ENDOCRINOLOGY | Facility: HOSPITAL | Age: 64
End: 2024-03-06

## 2024-03-06 VITALS
BODY MASS INDEX: 47.13 KG/M2 | HEIGHT: 63 IN | WEIGHT: 266 LBS | HEART RATE: 109 BPM | DIASTOLIC BLOOD PRESSURE: 69 MMHG | RESPIRATION RATE: 20 BRPM | SYSTOLIC BLOOD PRESSURE: 115 MMHG | OXYGEN SATURATION: 96 % | TEMPERATURE: 98.6 F

## 2024-03-06 DIAGNOSIS — I10 ESSENTIAL HYPERTENSION: ICD-10-CM

## 2024-03-06 PROBLEM — J96.20 ACUTE ON CHRONIC RESPIRATORY FAILURE (HCC): Status: RESOLVED | Noted: 2018-11-30 | Resolved: 2024-03-06

## 2024-03-06 LAB
ANION GAP SERPL CALCULATED.3IONS-SCNC: 4 MMOL/L
BUN SERPL-MCNC: 26 MG/DL (ref 5–25)
CALCIUM SERPL-MCNC: 9.6 MG/DL (ref 8.4–10.2)
CHLORIDE SERPL-SCNC: 89 MMOL/L (ref 96–108)
CO2 SERPL-SCNC: 41 MMOL/L (ref 21–32)
CREAT SERPL-MCNC: 0.7 MG/DL (ref 0.6–1.3)
GFR SERPL CREATININE-BSD FRML MDRD: 91 ML/MIN/1.73SQ M
GLUCOSE SERPL-MCNC: 142 MG/DL (ref 65–140)
GLUCOSE SERPL-MCNC: 191 MG/DL (ref 65–140)
GLUCOSE SERPL-MCNC: 301 MG/DL (ref 65–140)
POTASSIUM SERPL-SCNC: 4.4 MMOL/L (ref 3.5–5.3)
SODIUM SERPL-SCNC: 134 MMOL/L (ref 135–147)

## 2024-03-06 PROCEDURE — 94760 N-INVAS EAR/PLS OXIMETRY 1: CPT

## 2024-03-06 PROCEDURE — 94660 CPAP INITIATION&MGMT: CPT

## 2024-03-06 PROCEDURE — 99239 HOSP IP/OBS DSCHRG MGMT >30: CPT | Performed by: INTERNAL MEDICINE

## 2024-03-06 PROCEDURE — 82948 REAGENT STRIP/BLOOD GLUCOSE: CPT

## 2024-03-06 PROCEDURE — 94640 AIRWAY INHALATION TREATMENT: CPT

## 2024-03-06 PROCEDURE — 99232 SBSQ HOSP IP/OBS MODERATE 35: CPT | Performed by: INTERNAL MEDICINE

## 2024-03-06 PROCEDURE — 80048 BASIC METABOLIC PNL TOTAL CA: CPT | Performed by: INTERNAL MEDICINE

## 2024-03-06 RX ORDER — BUMETANIDE 2 MG/1
6 TABLET ORAL 2 TIMES DAILY
Qty: 180 TABLET | Refills: 0 | Status: SHIPPED | OUTPATIENT
Start: 2024-03-06 | End: 2024-04-05

## 2024-03-06 RX ORDER — INSULIN GLULISINE 100 [IU]/ML
15 INJECTION, SOLUTION SUBCUTANEOUS
Qty: 15 ML | Refills: 0 | Status: SHIPPED | OUTPATIENT
Start: 2024-03-06

## 2024-03-06 RX ORDER — METOPROLOL SUCCINATE 100 MG/1
100 TABLET, EXTENDED RELEASE ORAL DAILY
Qty: 90 TABLET | Refills: 3 | Status: SHIPPED | OUTPATIENT
Start: 2024-03-06

## 2024-03-06 RX ORDER — INSULIN GLARGINE 100 [IU]/ML
45 INJECTION, SOLUTION SUBCUTANEOUS
Qty: 15 ML | Refills: 0 | Status: SHIPPED | OUTPATIENT
Start: 2024-03-06

## 2024-03-06 RX ORDER — SPIRONOLACTONE 50 MG/1
100 TABLET, FILM COATED ORAL DAILY
Qty: 30 TABLET | Refills: 0 | Status: SHIPPED | OUTPATIENT
Start: 2024-03-06 | End: 2024-04-05

## 2024-03-06 RX ADMIN — BUDESONIDE 0.5 MG: 0.5 INHALANT ORAL at 07:57

## 2024-03-06 RX ADMIN — INSULIN LISPRO 2 UNITS: 100 INJECTION, SOLUTION INTRAVENOUS; SUBCUTANEOUS at 08:58

## 2024-03-06 RX ADMIN — INSULIN LISPRO 8 UNITS: 100 INJECTION, SOLUTION INTRAVENOUS; SUBCUTANEOUS at 12:45

## 2024-03-06 RX ADMIN — DULOXETINE HYDROCHLORIDE 60 MG: 30 CAPSULE, DELAYED RELEASE ORAL at 08:53

## 2024-03-06 RX ADMIN — FLUTICASONE PROPIONATE 1 SPRAY: 50 SPRAY, METERED NASAL at 08:58

## 2024-03-06 RX ADMIN — LIDOCAINE 5% 1 PATCH: 700 PATCH TOPICAL at 08:52

## 2024-03-06 RX ADMIN — ATORVASTATIN CALCIUM 40 MG: 40 TABLET, FILM COATED ORAL at 08:52

## 2024-03-06 RX ADMIN — METOPROLOL SUCCINATE 100 MG: 50 TABLET, EXTENDED RELEASE ORAL at 08:52

## 2024-03-06 RX ADMIN — PANTOPRAZOLE SODIUM 40 MG: 40 TABLET, DELAYED RELEASE ORAL at 04:37

## 2024-03-06 RX ADMIN — INSULIN LISPRO 15 UNITS: 100 INJECTION, SOLUTION INTRAVENOUS; SUBCUTANEOUS at 12:45

## 2024-03-06 RX ADMIN — SPIRONOLACTONE 100 MG: 25 TABLET ORAL at 08:52

## 2024-03-06 RX ADMIN — APIXABAN 5 MG: 5 TABLET, FILM COATED ORAL at 08:52

## 2024-03-06 RX ADMIN — BUMETANIDE 6 MG: 1 TABLET ORAL at 08:52

## 2024-03-06 RX ADMIN — EMPAGLIFLOZIN 10 MG: 10 TABLET, FILM COATED ORAL at 08:59

## 2024-03-06 RX ADMIN — INSULIN LISPRO 15 UNITS: 100 INJECTION, SOLUTION INTRAVENOUS; SUBCUTANEOUS at 09:41

## 2024-03-06 NOTE — PLAN OF CARE
Problem: Potential for Falls  Goal: Patient will remain free of falls  Description: INTERVENTIONS:  - Educate patient/family on patient safety including physical limitations  - Instruct patient to call for assistance with activity   - Consult OT/PT to assist with strengthening/mobility   - Keep Call bell within reach  - Keep bed low and locked with side rails adjusted as appropriate  - Keep care items and personal belongings within reach  - Initiate and maintain comfort rounds  - Make Fall Risk Sign visible to staff  - Obtain necessary fall risk management equipment: non-slip socks  - Apply yellow socks and bracelet for high fall risk patients  - Consider moving patient to room near nurses station  Outcome: Completed     Problem: PAIN - ADULT  Goal: Verbalizes/displays adequate comfort level or baseline comfort level  Description: Interventions:  - Encourage patient to monitor pain and request assistance  - Assess pain using appropriate pain scale  - Administer analgesics based on type and severity of pain and evaluate response  - Implement non-pharmacological measures as appropriate and evaluate response  - Consider cultural and social influences on pain and pain management  - Notify physician/advanced practitioner if interventions unsuccessful or patient reports new pain  Outcome: Completed     Problem: INFECTION - ADULT  Goal: Absence or prevention of progression during hospitalization  Description: INTERVENTIONS:  - Assess and monitor for signs and symptoms of infection  - Monitor lab/diagnostic results  - Monitor all insertion sites, i.e. indwelling lines, tubes, and drains  - Monitor endotracheal if appropriate and nasal secretions for changes in amount and color  - Candor appropriate cooling/warming therapies per order  - Administer medications as ordered  - Instruct and encourage patient and family to use good hand hygiene technique  - Identify and instruct in appropriate isolation precautions for  identified infection/condition  Outcome: Completed     Problem: SAFETY ADULT  Goal: Patient will remain free of falls  Description: INTERVENTIONS:  - Educate patient/family on patient safety including physical limitations  - Instruct patient to call for assistance with activity   - Consult OT/PT to assist with strengthening/mobility   - Keep Call bell within reach  - Keep bed low and locked with side rails adjusted as appropriate  - Keep care items and personal belongings within reach  - Initiate and maintain comfort rounds  - Make Fall Risk Sign visible to staff  - Obtain necessary fall risk management equipment: non-slip socks  - Apply yellow socks and bracelet for high fall risk patients  - Consider moving patient to room near nurses station  Outcome: Completed  Goal: Maintain or return to baseline ADL function  Description: INTERVENTIONS:  - Educate patient/family on patient safety including physical limitations  - Instruct patient to call for assistance with activity   - Consult OT/PT to assist with strengthening/mobility   - Keep Call bell within reach  - Keep bed low and locked with side rails adjusted as appropriate  - Keep care items and personal belongings within reach  - Initiate and maintain comfort rounds  - Make Fall Risk Sign visible to staff  - Obtain necessary fall risk management equipment: non-slip socks  - Apply yellow socks and bracelet for high fall risk patients  - Consider moving patient to room near nurses station  Outcome: Completed  Goal: Maintains/Returns to pre admission functional level  Description: INTERVENTIONS:  - Perform AM-PAC 6 Click Basic Mobility/ Daily Activity assessment daily.  - Set and communicate daily mobility goal to care team and patient/family/caregiver.   - Collaborate with rehabilitation services on mobility goals if consulted  - Perform Range of Motion 2 times a day.  - Reposition patient every 2 hours.  - Dangle patient 2 times a day  - Stand patient 2 times a  day  - Ambulate patient 2 times a day  - Out of bed to chair 2 times a day   - Out of bed for meals 2 times a day  - Out of bed for toileting  - Record patient progress and toleration of activity level   Outcome: Completed     Problem: DISCHARGE PLANNING  Goal: Discharge to home or other facility with appropriate resources  Description: INTERVENTIONS:  - Identify barriers to discharge w/patient and caregiver  - Arrange for needed discharge resources and transportation as appropriate  - Identify discharge learning needs (meds, wound care, etc.)  - Arrange for interpretive services to assist at discharge as needed  - Refer to Case Management Department for coordinating discharge planning if the patient needs post-hospital services based on physician/advanced practitioner order or complex needs related to functional status, cognitive ability, or social support system  Outcome: Completed     Problem: Knowledge Deficit  Goal: Patient/family/caregiver demonstrates understanding of disease process, treatment plan, medications, and discharge instructions  Description: Complete learning assessment and assess knowledge base.  Interventions:  - Provide teaching at level of understanding  - Provide teaching via preferred learning methods  Outcome: Completed     Problem: CARDIOVASCULAR - ADULT  Goal: Maintains optimal cardiac output and hemodynamic stability  Description: INTERVENTIONS:  - Monitor I/O, vital signs and rhythm  - Monitor for S/S and trends of decreased cardiac output  - Administer and titrate ordered vasoactive medications to optimize hemodynamic stability  - Assess quality of pulses, skin color and temperature  - Assess for signs of decreased coronary artery perfusion  - Instruct patient to report change in severity of symptoms  Outcome: Completed  Goal: Absence of cardiac dysrhythmias or at baseline rhythm  Description: INTERVENTIONS:  - Continuous cardiac monitoring, vital signs, obtain 12 lead EKG if  ordered  - Administer antiarrhythmic and heart rate control medications as ordered  - Monitor electrolytes and administer replacement therapy as ordered  Outcome: Completed     Problem: RESPIRATORY - ADULT  Goal: Achieves optimal ventilation and oxygenation  Description: INTERVENTIONS:  - Assess for changes in respiratory status  - Assess for changes in mentation and behavior  - Position to facilitate oxygenation and minimize respiratory effort  - Oxygen administered by appropriate delivery if ordered  - Initiate smoking cessation education as indicated  - Encourage broncho-pulmonary hygiene including cough, deep breathe, Incentive Spirometry  - Assess the need for suctioning and aspirate as needed  - Assess and instruct to report SOB or any respiratory difficulty  - Respiratory Therapy support as indicated  Outcome: Completed     Problem: METABOLIC, FLUID AND ELECTROLYTES - ADULT  Goal: Electrolytes maintained within normal limits  Description: INTERVENTIONS:  - Monitor labs and assess patient for signs and symptoms of electrolyte imbalances  - Administer electrolyte replacement as ordered  - Monitor response to electrolyte replacements, including repeat lab results as appropriate  - Instruct patient on fluid and nutrition as appropriate  Outcome: Completed  Goal: Fluid balance maintained  Description: INTERVENTIONS:  - Monitor labs   - Monitor I/O and WT  - Instruct patient on fluid and nutrition as appropriate  - Assess for signs & symptoms of volume excess or deficit  Outcome: Completed     Problem: Prexisting or High Potential for Compromised Skin Integrity  Goal: Skin integrity is maintained or improved  Description: INTERVENTIONS:  - Identify patients at risk for skin breakdown  - Assess and monitor skin integrity  - Assess and monitor nutrition and hydration status  - Monitor labs   - Assess for incontinence   - Turn and reposition patient  - Assist with mobility/ambulation  - Relieve pressure over bony  prominences  - Avoid friction and shearing  - Provide appropriate hygiene as needed including keeping skin clean and dry  - Evaluate need for skin moisturizer/barrier cream  - Collaborate with interdisciplinary team   - Patient/family teaching  - Consider wound care consult   Outcome: Completed

## 2024-03-06 NOTE — PROGRESS NOTES
Progress Note - Cardiology   Mattie Joy 63 y.o. female MRN: [id number]  Unit/Bed#: -01 Encounter: [id number]        Problem List:  Active Problems:    Type 2 diabetes mellitus with stage 2 chronic kidney disease, with long-term current use of insulin (HCC)    Paroxysmal atrial fibrillation (HCC)    Current moderate episode of major depressive disorder without prior episode (HCC)    Morbid obesity (HCC)    COPD, severe (HCC)    Acute on chronic diastolic CHF (congestive heart failure) ([facility])      Assessment:  Acute on chronic diastolic heart failure  8/15/2023 echo: LVEF 65%, grade 2 diastolic dysfunction, severely elevated RV systolic pressure 61 mmHg  Current diuretic: Bumex 5 BID  Wt as low as 265 in the hospital February 2024  Persistent atrial fibrillation  Thromboembolic PPx: Eliquis 5 twice daily  Rate control: Toprol- daily  COPD with acute exacerbation  Type 2 diabetes  Obesity, BMI 51  Pulmonary hypertension likely group 2/3 from left heart disease, underlying lung disease, OHV    -She is 266 pounds today and reporting good urine output with oral Bumex 6 mg twice daily. -Her renal function and electrolytes are stable. She is tolerating 100 mg daily of spironolactone  -We will continue current diuretic regiment  -We will arrange close outpatient follow-up    Plan/ Discussion:  Continue Bumex 6 mg twice daily  Continue Jardiance 10 mg daily  Continue spironolactone 100 mg daily  Continue metoprolol succinate 100 mg in the morning and 50 mg in the evening  Continue Eliquis 5 mg twice daily  Close outpatient follow-up is recommended, currently scheduled next week but we will try to see if this can be moved up so she can be seen in the next 24 to 72 hours    Subjective:  Feeling well today.  Breathing is fine.  Hopeful to go home.  Expressed understanding that she needs to be compliant with diet and medications to avoid further hospitalization    Vitals:  Vitals:    03/05/24 0534 03/06/24 0440    Weight: 120 kg (264 lb 9.6 oz) 121 kg (266 lb)   ,  Vitals:    03/06/24 0312 03/06/24 0440 03/06/24 0758 03/06/24 0758   BP:   115/69    BP Location:       Pulse:    (!) 109   Resp:   20    Temp:   98.6 °F (37 °C)    TempSrc:       SpO2: 98%   96%   Weight:  121 kg (266 lb)     Height:           Exam:  General: Alert awake and oriented, no acute distress  Heart:  Regular rate and rhythm, no murmurs, Normal S1, no edema    Respiratory effort/ Lungs:  Breathing comfortably on 4 L nasal cannula, clear bilaterally without wheezing, rales, crackles   Abdominal: Non-tender to palpation, + bowel sounds, soft, no masses or distension  Lower Limbs:  No edema            Telemetry:       Atrial fibrillation, Heart Rate     Medications:    Current Facility-Administered Medications:     acetaminophen (TYLENOL) tablet 650 mg, 650 mg, Oral, Q6H PRN, Rosamaria Miller PA-C, 650 mg at 03/05/24 1645    albuterol (PROVENTIL HFA,VENTOLIN HFA) inhaler 2 puff, 2 puff, Inhalation, Q4H PRN, Magdalena Fleming MD, 2 puff at 02/26/24 1822    albuterol inhalation solution 2.5 mg, 2.5 mg, Nebulization, Q6H PRN, Magdalena Fleming MD    apixaban (ELIQUIS) tablet 5 mg, 5 mg, Oral, BID, Magdalena Fleming MD, 5 mg at 03/06/24 0852    atorvastatin (LIPITOR) tablet 40 mg, 40 mg, Oral, Daily, Magdalena Fleming MD, 40 mg at 03/06/24 0852    budesonide (PULMICORT) inhalation solution 0.5 mg, 0.5 mg, Nebulization, Q12H, Magdalena Fleming MD, 0.5 mg at 03/06/24 0757    bumetanide (BUMEX) tablet 6 mg, 6 mg, Oral, BID, Chuck Harrell PA-C, 6 mg at 03/06/24 0852    DULoxetine (CYMBALTA) delayed release capsule 60 mg, 60 mg, Oral, Daily, Magdalena Fleming MD, 60 mg at 03/06/24 0853    Empagliflozin (JARDIANCE) tablet 10 mg, 10 mg, Oral, Daily, Magdalena Fleming MD, 10 mg at 03/06/24 0859    fluticasone (FLONASE) 50 mcg/act nasal spray 1 spray, 1 spray, Nasal, BID, Magdalena Fleming MD, 1 spray at 03/06/24 0858    insulin glargine  (LANTUS) subcutaneous injection 45 Units 0.45 mL, 45 Units, Subcutaneous, HS, Magdalena Fleming MD, 45 Units at 03/05/24 2103    insulin lispro (HumaLOG) 100 units/mL subcutaneous injection 1-5 Units, 1-5 Units, Subcutaneous, HS, Magdalena Fleming MD, 1 Units at 03/05/24 2102    insulin lispro (HumaLOG) 100 units/mL subcutaneous injection 2-12 Units, 2-12 Units, Subcutaneous, TID AC, 2 Units at 03/06/24 0858 **AND** Fingerstick Glucose (POCT), , , TID AC, Magdalena Fleming MD    insulin lispro (HumALOG/ADMELOG) 100 units/mL subcutaneous injection 15 Units, 15 Units, Subcutaneous, TID With Meals, Magdalena Fleming MD, 15 Units at 03/06/24 0941    lidocaine (LIDODERM) 5 % patch 1 patch, 1 patch, Topical, Daily, Magdalena Fleming MD, 1 patch at 03/06/24 0852    loratadine (CLARITIN) tablet 10 mg, 10 mg, Oral, Daily PRN, Magdalena Fleming MD    LORazepam (ATIVAN) tablet 0.5 mg, 0.5 mg, Oral, Daily PRN, Magdalena Fleming MD    metoprolol succinate (TOPROL-XL) 24 hr tablet 100 mg, 100 mg, Oral, QAM, Fran John PA-C, 100 mg at 03/06/24 0852    metoprolol succinate (TOPROL-XL) 24 hr tablet 50 mg, 50 mg, Oral, QPM, Fran John PA-C, 50 mg at 03/05/24 1645    montelukast (SINGULAIR) tablet 10 mg, 10 mg, Oral, HS, Magdalena Fleming MD, 10 mg at 03/05/24 2104    nystatin (MYCOSTATIN) powder, , Topical, BID, Magdalena Fleming MD, Given at 03/05/24 1646    pantoprazole (PROTONIX) EC tablet 40 mg, 40 mg, Oral, Early Morning, Magdalena Fleming MD, 40 mg at 03/06/24 0437    spironolactone (ALDACTONE) tablet 100 mg, 100 mg, Oral, Daily, Chuck Harrell PA-C, 100 mg at 03/06/24 0852    traZODone (DESYREL) tablet 150 mg, 150 mg, Oral, HS, Magdalena Fleming MD, 150 mg at 03/05/24 2100      Labs/Data:        Results from last 7 days   Lab Units 03/05/24  0317 03/04/24  0413 03/03/24  0217   WBC Thousand/uL 11.49* 11.54* 14.47*   HEMOGLOBIN g/dL 10.5* 10.4* 10.5*   HEMATOCRIT % 37.3 37.5 38.2    PLATELETS Thousands/uL 337 335 337     Results from last 7 days   Lab Units 03/06/24  0448 03/05/24  0317 03/04/24  0413   POTASSIUM mmol/L 4.4 4.2 3.8   CHLORIDE mmol/L 89* 89* 92*   CO2 mmol/L 41* 40* 34*   BUN mg/dL 26* 26* 20   CREATININE mg/dL 0.70 0.73 0.69

## 2024-03-06 NOTE — DISCHARGE SUMMARY
[facility]  Discharge- Mattie Joy 1960, 63 y.o. female MRN: [id number]  Unit/Bed#: -01 Encounter: [id number]  Primary Care Provider: Laith Olivares MD   Date and time admitted to hospital: 2/26/2024  1:46 PM    Acute on chronic diastolic CHF (congestive heart failure) (HCC)  Assessment & Plan  Wt Readings from Last 3 Encounters:   03/06/24 121 kg (266 lb)   02/26/24 127 kg (279 lb)   02/26/24 127 kg (279 lb)     Seen in the cardiology office 2/26  Approximately 15 pounds weight gain in the past 2 weeks.  Noncompliant with salt diet     patient is obese  Chest x-ray with congestion  Last echo August 2023 showed LVEF 65% diastolic dysfunction 2, R VSP 61 mmHg  Previously on Bumex drip, had  7.6 L urine output/24 hours , discontinued 3/5  Noncompliant with fluids restriction, drinks water from the sink, per nurse     Plan:   Outpatient cardiology follow-up  Transition to p.o. Bumex 6 mg twice daily 3/5  Strict intake output, Daily weights  Low-salt diet  Continue metoprolol and Jardiance  continue Aldactone 100 mg                   COPD, severe (HCC)  Assessment & Plan  Followed with pulmonology.  On albuterol nebulizers as needed, Pulmicort, Claritin, Singulair, bevespi   Noncompliant with medication.  Continue Pulmicort/albuterol, Claritin, Singulair    Not in COPD exacerbation currently    Morbid obesity (HCC)  Assessment & Plan  Lifestyle changes encouraged     Current moderate episode of major depressive disorder without prior episode (HCC)  Assessment & Plan  On Cymbalta, trazodone      Paroxysmal atrial fibrillation (HCC)  Assessment & Plan  On metoprolol 100 mg daily and Eliquis 5 twice daily          Type 2 diabetes mellitus with stage 2 chronic kidney disease, with long-term current use of insulin ([facility])  Assessment & Plan  Lab Results   Component Value Date    HGBA1C 8.5 (H) 01/22/2024       Recent Labs     03/05/24  1137 03/05/24  1627 03/05/24  2101  03/06/24  0755   POCGLU 285* 226* 159* 191*       Blood Sugar Average: Last 72 hrs:  (P) [id number]  On Lantus 55 units at bedtime and Apidra 25 units 3 times daily with meals.    Continue with Lantus 45 units at bedtime, Humalog  15 units 3 times daily with meals and sliding scale  Diabetic diet  Outpatient endocrinology follow-up    * Acute on chronic respiratory failure (HCC)-resolved as of 3/6/2024  Assessment & Plan  At baseline on 4 L nasal cannula oxygen secondary to severe COPD, pulmonary hypertension  Required up to 7 L nasal cannula oxygen on presentation  Likely decompensated secondary to CHF  Flu/COVID/RSV negative  Monitor with diuresis    Currently saturating fine at 4 L nasal cannula oxygen which is her baseline              Medical Problems       Resolved Problems  Date Reviewed: 3/6/2024            Resolved    * (Principal) Acute on chronic respiratory failure (HCC) 3/6/2024     Resolved by  Hawa Marsh MD    Constipation 2/29/2024     Resolved by  Magdalena Fleming MD          Admission Date:   Admission Orders (From admission, onward)       Ordered        02/26/24 1540  INPATIENT ADMISSION  Once                            Admitting Diagnosis: SOB (shortness of breath) [R06.02]  Acute exacerbation of CHF (congestive heart failure) (HCC) [I50.9]  COPD, severe (HCC) [J44.9]    HPI: as per, Magdalena Fleming MD , on 2/26 Mattie Joy is a 63 y.o. female with a PMH of severe COPD, chronic respiratory failure, chronic diastolic heart failure and pulmonary hypertension, type 2 diabetes, WILMA, anxiety and depression, who presents with weight gain and shortness of breath.  Patient with recent admission for same.  Patient was seen in cardiology office today was noted to have 15 pounds weight gain in the past 2 weeks also she was noted to be hypoxic at 5 L nasal cannula oxygen and patient was transferred to emergency department.  Patient briefly required BiPAP on presentation.     Was  admitted for further management       Procedures Performed:   Orders Placed This Encounter   Procedures    Critical Care       Summary of Hospital Course:     Patient presented with acute on chronic hypoxic respiratory failure secondary to acute on chronic diastolic heart failure.  Throughout her stay, she remained hemodynamically stable.  She was managed closely by cardiology.  She was initially on Bumex drip, with symptomatic and clinical improvement.  Later on she was transitioned to Bumex p.o. 6 mg twice daily, Aldactone 100 mg daily.  She was successfully weaned back to her baseline oxygen requirement, 4 L.  She will have outpatient cardiology follow-up.  Discharge plan was discussed in detail with patient and patient's brother, Abhijit.  All questions were answered to satisfaction.  Patient will be discharged home with home health care.    Significant Findings, Care, Treatment and Services Provided: see above    Complications: none    Condition at Discharge: stable         Discharge instructions/Information to patient and family:   See after visit summary for information provided to patient and family.      Provisions for Follow-Up Care:  See after visit summary for information related to follow-up care and any pertinent home health orders.      PCP: Laith Olivares MD    Disposition: Home with [facility]    Planned Readmission: No    Discharge Statement   I spent 36 minutes discharging the patient. This time was spent on the day of discharge. I had direct contact with the patient on the day of discharge. Additional documentation is required if more than 30 minutes were spent on discharge.     Discharge Medications:  See after visit summary for reconciled discharge medications provided to patient and family.

## 2024-03-06 NOTE — DISCHARGE INSTR - AVS FIRST PAGE
Please see your family doctor within 7 days of discharge to discuss recent hospitalization  Please follow-up with cardiology  Please monitor your blood sugars  Continue to take Bumex 6 mg twice daily  Continue to take Aldactone 100 mg daily  Do not drink more than 1800 mL fluid in a day  Lantus 45 units daily at bedtime, short-acting insulin 15 units 3 times a day with meals  Outpatient endocrinology follow-up

## 2024-03-06 NOTE — PLAN OF CARE
Problem: Potential for Falls  Goal: Patient will remain free of falls  Description: INTERVENTIONS:  - Educate patient/family on patient safety including physical limitations  - Instruct patient to call for assistance with activity   - Consult OT/PT to assist with strengthening/mobility   - Keep Call bell within reach  - Keep bed low and locked with side rails adjusted as appropriate  - Keep care items and personal belongings within reach  - Initiate and maintain comfort rounds  - Make Fall Risk Sign visible to staff  - Obtain necessary fall risk management equipment: non-slip socks  - Apply yellow socks and bracelet for high fall risk patients  - Consider moving patient to room near nurses station  Outcome: Progressing     Problem: PAIN - ADULT  Goal: Verbalizes/displays adequate comfort level or baseline comfort level  Description: Interventions:  - Encourage patient to monitor pain and request assistance  - Assess pain using appropriate pain scale  - Administer analgesics based on type and severity of pain and evaluate response  - Implement non-pharmacological measures as appropriate and evaluate response  - Consider cultural and social influences on pain and pain management  - Notify physician/advanced practitioner if interventions unsuccessful or patient reports new pain  Outcome: Progressing     Problem: INFECTION - ADULT  Goal: Absence or prevention of progression during hospitalization  Description: INTERVENTIONS:  - Assess and monitor for signs and symptoms of infection  - Monitor lab/diagnostic results  - Monitor all insertion sites, i.e. indwelling lines, tubes, and drains  - Monitor endotracheal if appropriate and nasal secretions for changes in amount and color  - [place] appropriate cooling/warming therapies per order  - Administer medications as ordered  - Instruct and encourage patient and family to use good hand hygiene technique  - Identify and instruct in appropriate isolation precautions for  identified infection/condition  Outcome: Progressing     Problem: SAFETY ADULT  Goal: Patient will remain free of falls  Description: INTERVENTIONS:  - Educate patient/family on patient safety including physical limitations  - Instruct patient to call for assistance with activity   - Consult OT/PT to assist with strengthening/mobility   - Keep Call bell within reach  - Keep bed low and locked with side rails adjusted as appropriate  - Keep care items and personal belongings within reach  - Initiate and maintain comfort rounds  - Make Fall Risk Sign visible to staff  - Obtain necessary fall risk management equipment: non-slip socks  - Apply yellow socks and bracelet for high fall risk patients  - Consider moving patient to room near nurses station  Outcome: Progressing  Goal: Maintain or return to baseline ADL function  Description: INTERVENTIONS:  - Educate patient/family on patient safety including physical limitations  - Instruct patient to call for assistance with activity   - Consult OT/PT to assist with strengthening/mobility   - Keep Call bell within reach  - Keep bed low and locked with side rails adjusted as appropriate  - Keep care items and personal belongings within reach  - Initiate and maintain comfort rounds  - Make Fall Risk Sign visible to staff  - Obtain necessary fall risk management equipment: non-slip socks  - Apply yellow socks and bracelet for high fall risk patients  - Consider moving patient to room near nurses station  Outcome: Progressing  Goal: Maintains/Returns to pre admission functional level  Description: INTERVENTIONS:  - Perform AM-PAC 6 Click Basic Mobility/ Daily Activity assessment daily.  - Set and communicate daily mobility goal to care team and patient/family/caregiver.   - Collaborate with rehabilitation services on mobility goals if consulted  - Perform Range of Motion 2 times a day.  - Reposition patient every 2 hours.  - Dangle patient 2 times a day  - Stand patient 2 times  a day  - Ambulate patient 2 times a day  - Out of bed to chair 2 times a day   - Out of bed for meals 2 times a day  - Out of bed for toileting  - Record patient progress and toleration of activity level   Outcome: Progressing     Problem: DISCHARGE PLANNING  Goal: Discharge to home or other facility with appropriate resources  Description: INTERVENTIONS:  - Identify barriers to discharge w/patient and caregiver  - Arrange for needed discharge resources and transportation as appropriate  - Identify discharge learning needs (meds, wound care, etc.)  - Arrange for interpretive services to assist at discharge as needed  - Refer to Case Management Department for coordinating discharge planning if the patient needs post-hospital services based on physician/advanced practitioner order or complex needs related to functional status, cognitive ability, or social support system  Outcome: Progressing     Problem: Knowledge Deficit  Goal: Patient/family/caregiver demonstrates understanding of disease process, treatment plan, medications, and discharge instructions  Description: Complete learning assessment and assess knowledge base.  Interventions:  - Provide teaching at level of understanding  - Provide teaching via preferred learning methods  Outcome: Progressing     Problem: CARDIOVASCULAR - ADULT  Goal: Maintains optimal cardiac output and hemodynamic stability  Description: INTERVENTIONS:  - Monitor I/O, vital signs and rhythm  - Monitor for S/S and trends of decreased cardiac output  - Administer and titrate ordered vasoactive medications to optimize hemodynamic stability  - Assess quality of pulses, skin color and temperature  - Assess for signs of decreased coronary artery perfusion  - Instruct patient to report change in severity of symptoms  Outcome: Progressing  Goal: Absence of cardiac dysrhythmias or at baseline rhythm  Description: INTERVENTIONS:  - Continuous cardiac monitoring, vital signs, obtain 12 lead EKG if  ordered  - Administer antiarrhythmic and heart rate control medications as ordered  - Monitor electrolytes and administer replacement therapy as ordered  Outcome: Progressing     Problem: RESPIRATORY - ADULT  Goal: Achieves optimal ventilation and oxygenation  Description: INTERVENTIONS:  - Assess for changes in respiratory status  - Assess for changes in mentation and behavior  - Position to facilitate oxygenation and minimize respiratory effort  - Oxygen administered by appropriate delivery if ordered  - Initiate smoking cessation education as indicated  - Encourage broncho-pulmonary hygiene including cough, deep breathe, Incentive Spirometry  - Assess the need for suctioning and aspirate as needed  - Assess and instruct to report SOB or any respiratory difficulty  - Respiratory Therapy support as indicated  Outcome: Progressing     Problem: METABOLIC, FLUID AND ELECTROLYTES - ADULT  Goal: Electrolytes maintained within normal limits  Description: INTERVENTIONS:  - Monitor labs and assess patient for signs and symptoms of electrolyte imbalances  - Administer electrolyte replacement as ordered  - Monitor response to electrolyte replacements, including repeat lab results as appropriate  - Instruct patient on fluid and nutrition as appropriate  Outcome: Progressing  Goal: Fluid balance maintained  Description: INTERVENTIONS:  - Monitor labs   - Monitor I/O and WT  - Instruct patient on fluid and nutrition as appropriate  - Assess for signs & symptoms of volume excess or deficit  Outcome: Progressing     Problem: Prexisting or High Potential for Compromised Skin Integrity  Goal: Skin integrity is maintained or improved  Description: INTERVENTIONS:  - Identify patients at risk for skin breakdown  - Assess and monitor skin integrity  - Assess and monitor nutrition and hydration status  - Monitor labs   - Assess for incontinence   - Turn and reposition patient  - Assist with mobility/ambulation  - Relieve pressure over  bony prominences  - Avoid friction and shearing  - Provide appropriate hygiene as needed including keeping skin clean and dry  - Evaluate need for skin moisturizer/barrier cream  - Collaborate with interdisciplinary team   - Patient/family teaching  - Consider wound care consult   Outcome: Progressing

## 2024-03-07 ENCOUNTER — TELEPHONE (OUTPATIENT)
Dept: FAMILY MEDICINE CLINIC | Facility: HOSPITAL | Age: 64
End: 2024-03-07

## 2024-03-07 NOTE — TELEPHONE ENCOUNTER
[facility] nurse requesting call back from clinical staff.    Patient just d/c'd and reopened to home care.    Nurse has multiple things she wants to review.

## 2024-03-07 NOTE — TELEPHONE ENCOUNTER
Spoke to [facility], opened patient to services today. Wanted PCP to be aware of the following:     In addition to nursing, she has ordered PT, OT, and social work.   Nurse reports patient is non-compliant with fluid intake restriction of 1800cc. Patient had multiple bottle of drinks on her chair side table. Weight today was 266.1lb. Patient was 266lb at d/c.   Hr was 120, patient non-symptomatic. Patient has Cardio appt 3/8.   Patient not wearing Dexcom.

## 2024-03-07 NOTE — PROGRESS NOTES
Cardiology Office Follow Up  Mattie Joy  1960  [id number]      ASSESSMENT:  Acute on chronic diastolic heart failure  8/15/2023 echo: LVEF 65%, grade 2 diastolic dysfunction, severely elevated RV systolic pressure 61 mmHg  Current diuretic: Bumex 6 BID, Aldactone 100 QD, potassium 40 QD  Baseline wt around 265 lbs  Persistent atrial fibrillation  Thromboembolic PPx: Eliquis 5 twice daily  Rate control: Toprol- mg QD, Diltiazem 120 QD  COPD with acute exacerbation  Type 2 diabetes  Obesity, BMI 51  Pulmonary hypertension likely group 2/3 from left heart disease, underlying lung disease, OHV    PLAN/ DISCUSSION:  She appears euvolemic today and weight is stable 265 pounds  We will continue Bumex 6 mg twice daily with 100 daily of Aldactone  She is also on potassium supplements noted that in the hospital she had severe propensity for hypokalemia. We will recheck a metabolic panel prior to next visit. I have requested close 2-week follow-up as she is certainly high risk for readmission. She is following up with PCP next week.  Her rates are well-controlled on current regimen  No changes were made to medications today  She does have the [employer] heart failure monitor. Unfortunately we are unable to place this on the today as staff does not sign off to do so. She is going to try and put this on at home. We will follow-up in 2 weeks.    Interval History/ HPI:   63-year-old female well-known to our group.  She was just hospitalized for congestive heart failure last week.  She is here today for a posthospital follow-up.    Today she comes to the office accompanied by a close friend.  She reports that her breathing is stable.  She is weighing herself on a regular basis and is stable at 265 pounds.  She remains on oral Bumex and reports frequent urination.  She says that her kids have cleaned out her house and she is now getting Meals on Wheels.     Vitals:  /66 (BP Location: Right arm, Patient Position:  "Sitting, Cuff Size: Large)   Pulse 101   Ht 5' 3\" (1.6 m)   Wt 120 kg (265 lb)   SpO2 92%   BMI 46.94 kg/m²      Past Medical History:   Diagnosis Date    Acute blood loss anemia 06/01/2021    Acute diverticulitis 05/02/2021    Acute on chronic diastolic congestive heart failure (HCC) 05/21/2020    Acute on chronic respiratory failure with hypoxia (HCC) 11/30/2018    Alcohol abuse 05/21/2020    Anemia     Asthma with COPD with exacerbation  11/12/2020    Atrial fibrillation ([facility])     Cervical radiculopathy     CHF (congestive heart failure) ([facility])     Chronic low back pain     Chronic obstructive asthma 02/20/2018    Cigarette nicotine dependence without complication 11/20/2019    Quit 12/10/2019.     Community acquired pneumonia 05/21/2020    COPD exacerbation ([facility]) 09/16/2020    Depression with anxiety 03/09/2014    Diabetes mellitus with hyperglycemia ([facility])     Diverticulitis 06/06/2021    Elevated liver enzymes     GERD (gastroesophageal reflux disease)     Hypersomnolence 10/28/2020    Hypokalemia 12/04/2018    Lower gastrointestinal bleeding 06/01/2021    Paresthesia of upper extremity     Plantar fasciitis     Restless legs syndrome 04/08/2014    Severe persistent asthma with exacerbation 02/20/2018    PFTs February 8, 2018:  FEV1 0.87 L-35% predicted, 27% improvement post-bronchodilator.  DLCO-82% predicted,  The patient has been having worsening of her symptoms now for few months, especially  from January 2020, also she had multiple exacerbations last year and most recently required a steroid burst and August  Reviewing her CT scan  New PFTs with severe obstruction reviewed  Hypersensitivity p    Sexual dysfunction     Sleep apnea, obstructive     Tenosynovitis of finger     Tinea corporis     Tobacco use 02/20/2018    Currently smoking 3-4 cigarettes daily    Trigger middle finger of left hand 12/14/2017    Trigger ring finger of left hand 12/14/2017    Weakness of upper extremity      Social History "     Socioeconomic History    Marital status: Significant Other     Spouse name: Not on file    Number of children: Not on file    Years of education: Not on file    Highest education level: Not on file   Occupational History    Not on file   Tobacco Use    Smoking status: Former     Current packs/day: 0.00     Average packs/day: 0.3 packs/day for 29.9 years (7.5 ttl pk-yrs)     Types: Cigarettes     Start date:      Quit date: 12/10/2019     Years since quittin.2    Smokeless tobacco: Never   Vaping Use    Vaping status: Never Used   Substance and Sexual Activity    Alcohol use: Not Currently     Alcohol/week: 20.0 standard drinks of alcohol     Types: 20 Cans of beer per week     Comment: about 6 coors light every night, stopped in 2019    Drug use: No    Sexual activity: Not Currently   Other Topics Concern    Not on file   Social History Narrative    Not on file     Social Determinants of Health     Financial Resource Strain: Not on file   Food Insecurity: No Food Insecurity (2024)    Hunger Vital Sign     Worried About Running Out of Food in the Last Year: Never true     Ran Out of Food in the Last Year: Never true   Transportation Needs: No Transportation Needs (2024)    PRAPARE - Transportation     Lack of Transportation (Medical): No     Lack of Transportation (Non-Medical): No   Physical Activity: Not on file   Stress: Not on file   Social Connections: Not on file   Intimate Partner Violence: Not on file   Housing Stability: Low Risk  (2024)    Housing Stability Vital Sign     Unable to Pay for Housing in the Last Year: No     Number of Places Lived in the Last Year: 1     Unstable Housing in the Last Year: No      Family History   Problem Relation Age of Onset    Alzheimer's disease Mother     Atrial fibrillation Mother     Dementia Mother     Heart disease Mother         heart problem    Seizures Mother     Parkinsonism Father     Arthritis Father     Atrial fibrillation Father      No Known Problems Daughter     Cri-du-chat syndrome Daughter     Colon cancer Maternal Grandmother 80    Diabetes type II Maternal Grandmother     No Known Problems Brother     No Known Problems Son     Substance Abuse Neg Hx         mother,father    Mental illness Neg Hx         mother,father    Colon polyps Neg Hx      Past Surgical History:   Procedure Laterality Date    ABDOMINAL SURGERY      CARPAL TUNNEL RELEASE Bilateral      SECTION      CHOLECYSTECTOMY      laparoscopic    ESOPHAGOGASTRODUODENOSCOPY N/A 12/3/2018    Procedure: ESOPHAGOGASTRODUODENOSCOPY (EGD) AND COLONOSCOPY;  Surgeon: Ludmila Perry MD;  Location: [facility];  Service: Gastroenterology    GASTRIC BYPASS      for morbid obesity with gilda en y    HERNIA REPAIR      HYSTERECTOMY      GA EXCISION GANGLION WRIST DORSAL/VOLAR PRIMARY Left 2017    Procedure: LONG FINGER GANGLION CYST EXCISION;  Surgeon: Philippe Clark MD;  Location: [facility];  Service: Orthopedics    GA TENDON SHEATH INCISION Left 2017    Procedure: THUMB, LONG, RING, TRIGGER FINGER RELEASE;  Surgeon: Philippe Clark MD;  Location: [facility];  Service: Orthopedics    SHOULDER SURGERY Right        Current Outpatient Medications:     albuterol (2.5 mg/3 mL) 0.083 % nebulizer solution, Take 3 mL (2.5 mg total) by nebulization every 6 (six) hours as needed for wheezing or shortness of breath, Disp: 1080 mL, Rfl: 3    albuterol (PROVENTIL HFA,VENTOLIN HFA) 90 mcg/act inhaler, Inhale 2 puffs every 4 (four) hours as needed for wheezing, Disp: 8.5 g, Rfl: 5    apixaban (Eliquis) 5 mg, Take 1 tablet (5 mg total) by mouth 2 (two) times a day, Disp: 60 tablet, Rfl: 2    atorvastatin (LIPITOR) 40 mg tablet, Take 1 tablet (40 mg total) by mouth daily, Disp: 90 tablet, Rfl: 3    Blood Glucose Monitoring Suppl (Contour Next One) AILYN, USE AS DIRECTED 3 TIMES A DAY (Patient taking differently: USE AS DIRECTED 3 TIMES A DAY  Once a day), Disp: 1 each, Rfl: 0     budesonide (PULMICORT) 0.5 mg/2 mL nebulizer solution, TAKE 2 ML (0.5 MG TOTAL) BY NEBULIZATION TWICE A DAY RINSE MOUTH AFTER USE (Patient not taking: Reported on 2/26/2024), Disp: 60 mL, Rfl: 3    bumetanide (BUMEX) 2 mg tablet, Take 3 tablets (6 mg total) by mouth 2 (two) times a day, Disp: 180 tablet, Rfl: 0    Contour Next Test test strip, USE TO TEST BLOOD SUGAR 3 TIMES A DAY (Patient taking differently: USE TO TEST BLOOD SUGAR 3 TIMES A DAY  Once a day), Disp: 100 strip, Rfl: 3    [employer] Lancets Thin 26G MISC, USE 3 (THREE) TIMES A DAY (Patient taking differently: Once a day), Disp: 100 each, Rfl: 5    DULoxetine (CYMBALTA) 60 mg delayed release capsule, Take 60 mg by mouth daily, Disp: , Rfl:     Empagliflozin (JARDIANCE) 10 MG TABS tablet, Take 1 tablet (10 mg total) by mouth daily, Disp: 60 tablet, Rfl: 0    EPINEPHrine (EPIPEN) 0.3 mg/0.3 mL SOAJ, Inject 0.3 mL (0.3 mg total) into a muscle once for 1 dose, Disp: 0.6 mL, Rfl: 0    fluticasone (FLONASE) 50 mcg/act nasal spray, 1 spray into each nostril 2 (two) times a day (Patient not taking: Reported on 2/26/2024), Disp: 1 Bottle, Rfl: 3    glycopyrrolate-formoterol (BEVESPI AEROSPHERE) 9-4.8 MCG/ACT inhaler, Inhale 2 puffs 2 (two) times a day, Disp: 10.7 g, Rfl: 5    Insulin Glargine Solostar (Lantus SoloStar) 100 UNIT/ML SOPN, Inject 0.45 mL (45 Units total) as directed daily at bedtime Inject 45 units daily at bedtime, Disp: 15 mL, Rfl: 0    insulin glulisine (Apidra SoloStar) 100 units/mL injection pen, Inject 15 Units under the skin 3 (three) times a day with meals 15 units 3 times a day with meals, Disp: 15 mL, Rfl: 0    Insulin Pen Needle (BD Pen Needle Love 2nd Gen) 32G X 4 MM MISC, Use daily at bedtime Use 4 new needles daily ., Disp: 400 each, Rfl: 3    levalbuterol (XOPENEX) 1.25 mg/0.5 mL nebulizer solution, Take 0.5 mL (1.25 mg total) by nebulization 2 (two) times a day (Patient not taking: Reported on 2/26/2024), Disp: 60 vial, Rfl: 0     loratadine (CLARITIN) 10 mg tablet, Take 1 tablet (10 mg total) by mouth daily as needed for allergies, Disp: 30 tablet, Rfl: 0    LORazepam (ATIVAN) 0.5 mg tablet, TAKE 2 TABLETS BY MOUTH AT BEDTIME AND 1 EVERY 6 HOURS AS NEEDED FOR ANXIETY, Disp: 90 tablet, Rfl: 0    metoprolol succinate (TOPROL-XL) 100 mg 24 hr tablet, Take 1 tablet (100 mg total) by mouth daily, Disp: 90 tablet, Rfl: 3    montelukast (SINGULAIR) 10 mg tablet, TAKE 1 TABLET BY MOUTH EVERYDAY AT BEDTIME, Disp: 90 tablet, Rfl: 3    naloxone (NARCAN) 4 mg/0.1 mL nasal spray, Administer 1 spray into a nostril. If breathing does not return to normal or if breathing difficulty resumes after 2-3 minutes, give another dose in the other nostril using a new spray., Disp: 1 each, Rfl: 1    nystatin (MYCOSTATIN) powder, Apply topically 2 (two) times a day (Patient not taking: Reported on 2/26/2024), Disp: 30 g, Rfl: 0    omeprazole (PriLOSEC) 40 MG capsule, Take 1 capsule (40 mg total) by mouth daily, Disp: 90 capsule, Rfl: 3    potassium chloride (Klor-Con M20) 20 mEq tablet, Take 2 tablets (40 mEq total) by mouth daily, Disp: 180 tablet, Rfl: 3    spironolactone (ALDACTONE) 50 mg tablet, Take 2 tablets (100 mg total) by mouth daily, Disp: 30 tablet, Rfl: 0    traZODone (DESYREL) 150 mg tablet, TAKE 1 TABLET BY MOUTH EVERYDAY AT BEDTIME, Disp: 90 tablet, Rfl: 0  No current facility-administered medications for this visit.      Review of Systems:  Review of Systems   Constitutional:  Negative for chills and fever.   HENT:  Negative for ear pain and sore throat.    Eyes:  Negative for pain and visual disturbance.   Respiratory:  Negative for cough and shortness of breath.    Cardiovascular:  Negative for chest pain and palpitations.   Gastrointestinal:  Negative for abdominal pain and vomiting.   Genitourinary:  Negative for dysuria and hematuria.   Musculoskeletal:  Negative for arthralgias and back pain.   Skin:  Negative for color change and rash.    Neurological:  Negative for seizures and syncope.   All other systems reviewed and are negative.        Physical Exam:  Physical Exam  Constitutional:       General: She is not in acute distress.     Appearance: Normal appearance. She is not ill-appearing.   HENT:      Head: Normocephalic and atraumatic.      Right Ear: External ear normal.      Left Ear: External ear normal.      Mouth/Throat:      Pharynx: No oropharyngeal exudate or posterior oropharyngeal erythema.   Eyes:      General:         Right eye: No discharge.         Left eye: No discharge.      Conjunctiva/sclera: Conjunctivae normal.      Pupils: Pupils are equal, round, and reactive to light.   Cardiovascular:      Rate and Rhythm: Normal rate and regular rhythm.      Pulses: Normal pulses.      Heart sounds: Normal heart sounds. No murmur heard.     No friction rub. No gallop.   Pulmonary:      Effort: Pulmonary effort is normal.      Breath sounds: Normal breath sounds. No wheezing, rhonchi or rales.   Abdominal:      General: Abdomen is flat. There is no distension.      Palpations: Abdomen is soft.      Tenderness: There is no abdominal tenderness.   Musculoskeletal:         General: No swelling, tenderness or deformity. Normal range of motion.      Cervical back: Normal range of motion.   Skin:     General: Skin is warm and dry.   Neurological:      General: No focal deficit present.      Mental Status: She is alert and oriented to person, place, and time.         This note was completed in part utilizing M-Modal Fluency Direct Software.  Grammatical errors, random word insertions, spelling mistakes, and incomplete sentences can be an occasional consequence of this system secondary to software limitations, ambient noise, and hardware issues.  If you have any questions or concerns about the content, text, or information contained within the body of this dictation, please contact the provider for clarification.

## 2024-03-08 ENCOUNTER — HOSPITAL ENCOUNTER (INPATIENT)
Facility: HOSPITAL | Age: 64
LOS: 1 days | Discharge: HOME | DRG: 309 | End: 2024-03-10
Attending: EMERGENCY MEDICINE | Admitting: INTERNAL MEDICINE
Payer: MEDICARE

## 2024-03-08 ENCOUNTER — OFFICE VISIT (OUTPATIENT)
Dept: CARDIOLOGY CLINIC | Facility: CLINIC | Age: 64
End: 2024-03-08
Payer: MEDICARE

## 2024-03-08 ENCOUNTER — APPOINTMENT (EMERGENCY)
Dept: RADIOLOGY | Facility: HOSPITAL | Age: 64
DRG: 309 | End: 2024-03-08
Payer: MEDICARE

## 2024-03-08 VITALS
WEIGHT: 269 LBS | HEART RATE: 131 BPM | HEIGHT: 63 IN | DIASTOLIC BLOOD PRESSURE: 64 MMHG | OXYGEN SATURATION: 95 % | BODY MASS INDEX: 47.66 KG/M2 | SYSTOLIC BLOOD PRESSURE: 108 MMHG

## 2024-03-08 DIAGNOSIS — I48.91 ATRIAL FIBRILLATION WITH RAPID VENTRICULAR RESPONSE (HCC): Primary | ICD-10-CM

## 2024-03-08 DIAGNOSIS — J44.9 COPD, SEVERE (HCC): ICD-10-CM

## 2024-03-08 DIAGNOSIS — I48.0 PAROXYSMAL ATRIAL FIBRILLATION (HCC): Primary | ICD-10-CM

## 2024-03-08 DIAGNOSIS — I50.32 CHRONIC DIASTOLIC HEART FAILURE (HCC): ICD-10-CM

## 2024-03-08 DIAGNOSIS — I50.33 ACUTE ON CHRONIC DIASTOLIC HEART FAILURE (HCC): ICD-10-CM

## 2024-03-08 LAB
2HR DELTA HS TROPONIN: -1 NG/L
4HR DELTA HS TROPONIN: 0 NG/L
ALBUMIN SERPL BCP-MCNC: 4.2 G/DL (ref 3.5–5)
ALP SERPL-CCNC: 137 U/L (ref 34–104)
ALT SERPL W P-5'-P-CCNC: 13 U/L (ref 7–52)
ANION GAP SERPL CALCULATED.3IONS-SCNC: 10 MMOL/L
AST SERPL W P-5'-P-CCNC: 22 U/L (ref 13–39)
BACTERIA UR QL AUTO: NORMAL /HPF
BASOPHILS # BLD AUTO: 0.04 THOUSANDS/ÂΜL (ref 0–0.1)
BASOPHILS NFR BLD AUTO: 0 % (ref 0–1)
BILIRUB SERPL-MCNC: 0.39 MG/DL (ref 0.2–1)
BILIRUB UR QL STRIP: NEGATIVE
BNP SERPL-MCNC: 96 PG/ML (ref 0–100)
BUN SERPL-MCNC: 26 MG/DL (ref 5–25)
CALCIUM SERPL-MCNC: 8.7 MG/DL (ref 8.4–10.2)
CARDIAC TROPONIN I PNL SERPL HS: 4 NG/L
CARDIAC TROPONIN I PNL SERPL HS: 5 NG/L
CARDIAC TROPONIN I PNL SERPL HS: 5 NG/L
CHLORIDE SERPL-SCNC: 90 MMOL/L (ref 96–108)
CLARITY UR: CLEAR
CO2 SERPL-SCNC: 33 MMOL/L (ref 21–32)
COLOR UR: YELLOW
CREAT SERPL-MCNC: 0.74 MG/DL (ref 0.6–1.3)
EOSINOPHIL # BLD AUTO: 0 THOUSAND/ÂΜL (ref 0–0.61)
EOSINOPHIL NFR BLD AUTO: 0 % (ref 0–6)
ERYTHROCYTE [DISTWIDTH] IN BLOOD BY AUTOMATED COUNT: 19.2 % (ref 11.6–15.1)
FLUAV RNA RESP QL NAA+PROBE: NEGATIVE
FLUBV RNA RESP QL NAA+PROBE: NEGATIVE
GFR SERPL CREATININE-BSD FRML MDRD: 85 ML/MIN/1.73SQ M
GLUCOSE SERPL-MCNC: 159 MG/DL (ref 65–140)
GLUCOSE SERPL-MCNC: 260 MG/DL (ref 65–140)
GLUCOSE SERPL-MCNC: 266 MG/DL (ref 65–140)
GLUCOSE UR STRIP-MCNC: ABNORMAL MG/DL
HCT VFR BLD AUTO: 39.2 % (ref 34.8–46.1)
HGB BLD-MCNC: 11 G/DL (ref 11.5–15.4)
HGB UR QL STRIP.AUTO: ABNORMAL
IMM GRANULOCYTES # BLD AUTO: 0.09 THOUSAND/UL (ref 0–0.2)
IMM GRANULOCYTES NFR BLD AUTO: 1 % (ref 0–2)
KETONES UR STRIP-MCNC: NEGATIVE MG/DL
LEUKOCYTE ESTERASE UR QL STRIP: ABNORMAL
LYMPHOCYTES # BLD AUTO: 2.71 THOUSANDS/ÂΜL (ref 0.6–4.47)
LYMPHOCYTES NFR BLD AUTO: 16 % (ref 14–44)
MAGNESIUM SERPL-MCNC: 2.6 MG/DL (ref 1.9–2.7)
MCH RBC QN AUTO: 18.6 PG (ref 26.8–34.3)
MCHC RBC AUTO-ENTMCNC: 28.1 G/DL (ref 31.4–37.4)
MCV RBC AUTO: 66 FL (ref 82–98)
MONOCYTES # BLD AUTO: 1.16 THOUSAND/ÂΜL (ref 0.17–1.22)
MONOCYTES NFR BLD AUTO: 7 % (ref 4–12)
NEUTROPHILS # BLD AUTO: 12.55 THOUSANDS/ÂΜL (ref 1.85–7.62)
NEUTS SEG NFR BLD AUTO: 76 % (ref 43–75)
NITRITE UR QL STRIP: NEGATIVE
NON-SQ EPI CELLS URNS QL MICRO: NORMAL /HPF
NRBC BLD AUTO-RTO: 0 /100 WBCS
PH UR STRIP.AUTO: 5 [PH]
PLATELET # BLD AUTO: 362 THOUSANDS/UL (ref 149–390)
POTASSIUM SERPL-SCNC: 4.7 MMOL/L (ref 3.5–5.3)
PROT SERPL-MCNC: 7.1 G/DL (ref 6.4–8.4)
PROT UR STRIP-MCNC: NEGATIVE MG/DL
RBC # BLD AUTO: 5.92 MILLION/UL (ref 3.81–5.12)
RBC #/AREA URNS AUTO: NORMAL /HPF
RSV RNA RESP QL NAA+PROBE: NEGATIVE
SARS-COV-2 RNA RESP QL NAA+PROBE: NEGATIVE
SODIUM SERPL-SCNC: 133 MMOL/L (ref 135–147)
SP GR UR STRIP.AUTO: 1.01 (ref 1–1.03)
TSH SERPL DL<=0.05 MIU/L-ACNC: 2.13 UIU/ML (ref 0.45–4.5)
UROBILINOGEN UR STRIP-ACNC: <2 MG/DL
WBC # BLD AUTO: 16.55 THOUSAND/UL (ref 4.31–10.16)
WBC #/AREA URNS AUTO: NORMAL /HPF

## 2024-03-08 PROCEDURE — 99285 EMERGENCY DEPT VISIT HI MDM: CPT | Performed by: EMERGENCY MEDICINE

## 2024-03-08 PROCEDURE — 0241U HB NFCT DS VIR RESP RNA 4 TRGT: CPT | Performed by: INTERNAL MEDICINE

## 2024-03-08 PROCEDURE — 82948 REAGENT STRIP/BLOOD GLUCOSE: CPT

## 2024-03-08 PROCEDURE — 84484 ASSAY OF TROPONIN QUANT: CPT | Performed by: EMERGENCY MEDICINE

## 2024-03-08 PROCEDURE — 81001 URINALYSIS AUTO W/SCOPE: CPT | Performed by: EMERGENCY MEDICINE

## 2024-03-08 PROCEDURE — 84484 ASSAY OF TROPONIN QUANT: CPT | Performed by: INTERNAL MEDICINE

## 2024-03-08 PROCEDURE — 84443 ASSAY THYROID STIM HORMONE: CPT | Performed by: INTERNAL MEDICINE

## 2024-03-08 PROCEDURE — 93000 ELECTROCARDIOGRAM COMPLETE: CPT | Performed by: INTERNAL MEDICINE

## 2024-03-08 PROCEDURE — 99215 OFFICE O/P EST HI 40 MIN: CPT | Performed by: INTERNAL MEDICINE

## 2024-03-08 PROCEDURE — 83735 ASSAY OF MAGNESIUM: CPT | Performed by: INTERNAL MEDICINE

## 2024-03-08 PROCEDURE — 87040 BLOOD CULTURE FOR BACTERIA: CPT | Performed by: INTERNAL MEDICINE

## 2024-03-08 PROCEDURE — 94640 AIRWAY INHALATION TREATMENT: CPT

## 2024-03-08 PROCEDURE — 71045 X-RAY EXAM CHEST 1 VIEW: CPT

## 2024-03-08 PROCEDURE — 94760 N-INVAS EAR/PLS OXIMETRY 1: CPT

## 2024-03-08 PROCEDURE — 85025 COMPLETE CBC W/AUTO DIFF WBC: CPT | Performed by: EMERGENCY MEDICINE

## 2024-03-08 PROCEDURE — 96374 THER/PROPH/DIAG INJ IV PUSH: CPT

## 2024-03-08 PROCEDURE — 80053 COMPREHEN METABOLIC PANEL: CPT | Performed by: EMERGENCY MEDICINE

## 2024-03-08 PROCEDURE — 93005 ELECTROCARDIOGRAM TRACING: CPT

## 2024-03-08 PROCEDURE — 94660 CPAP INITIATION&MGMT: CPT

## 2024-03-08 PROCEDURE — 96375 TX/PRO/DX INJ NEW DRUG ADDON: CPT

## 2024-03-08 PROCEDURE — 99223 1ST HOSP IP/OBS HIGH 75: CPT | Performed by: INTERNAL MEDICINE

## 2024-03-08 PROCEDURE — 99285 EMERGENCY DEPT VISIT HI MDM: CPT

## 2024-03-08 PROCEDURE — 83880 ASSAY OF NATRIURETIC PEPTIDE: CPT | Performed by: EMERGENCY MEDICINE

## 2024-03-08 PROCEDURE — 36415 COLL VENOUS BLD VENIPUNCTURE: CPT | Performed by: EMERGENCY MEDICINE

## 2024-03-08 PROCEDURE — G2211 COMPLEX E/M VISIT ADD ON: HCPCS | Performed by: INTERNAL MEDICINE

## 2024-03-08 RX ORDER — ALBUTEROL SULFATE 2.5 MG/3ML
2.5 SOLUTION RESPIRATORY (INHALATION) EVERY 6 HOURS PRN
Status: DISCONTINUED | OUTPATIENT
Start: 2024-03-08 | End: 2024-03-10 | Stop reason: HOSPADM

## 2024-03-08 RX ORDER — PANTOPRAZOLE SODIUM 40 MG/1
40 TABLET, DELAYED RELEASE ORAL
Status: DISCONTINUED | OUTPATIENT
Start: 2024-03-09 | End: 2024-03-10 | Stop reason: HOSPADM

## 2024-03-08 RX ORDER — DAPAGLIFLOZIN 10 MG/1
10 TABLET, FILM COATED ORAL DAILY
Qty: 30 TABLET | Refills: 6 | Status: SHIPPED | OUTPATIENT
Start: 2024-03-08

## 2024-03-08 RX ORDER — DULOXETIN HYDROCHLORIDE 30 MG/1
60 CAPSULE, DELAYED RELEASE ORAL DAILY
Status: DISCONTINUED | OUTPATIENT
Start: 2024-03-08 | End: 2024-03-10 | Stop reason: HOSPADM

## 2024-03-08 RX ORDER — SPIRONOLACTONE 25 MG/1
100 TABLET ORAL DAILY
Status: DISCONTINUED | OUTPATIENT
Start: 2024-03-08 | End: 2024-03-10 | Stop reason: HOSPADM

## 2024-03-08 RX ORDER — ATORVASTATIN CALCIUM 40 MG/1
40 TABLET, FILM COATED ORAL
Status: DISCONTINUED | OUTPATIENT
Start: 2024-03-08 | End: 2024-03-10 | Stop reason: HOSPADM

## 2024-03-08 RX ORDER — POTASSIUM CHLORIDE 20 MEQ/1
40 TABLET, EXTENDED RELEASE ORAL DAILY
Status: DISCONTINUED | OUTPATIENT
Start: 2024-03-08 | End: 2024-03-10 | Stop reason: HOSPADM

## 2024-03-08 RX ORDER — IPRATROPIUM BROMIDE AND ALBUTEROL SULFATE 2.5; .5 MG/3ML; MG/3ML
3 SOLUTION RESPIRATORY (INHALATION) ONCE
Status: COMPLETED | OUTPATIENT
Start: 2024-03-08 | End: 2024-03-08

## 2024-03-08 RX ORDER — INSULIN LISPRO 100 [IU]/ML
15 INJECTION, SOLUTION INTRAVENOUS; SUBCUTANEOUS
Status: DISCONTINUED | OUTPATIENT
Start: 2024-03-08 | End: 2024-03-10 | Stop reason: HOSPADM

## 2024-03-08 RX ORDER — LORAZEPAM 0.5 MG/1
0.5 TABLET ORAL
Status: DISCONTINUED | OUTPATIENT
Start: 2024-03-08 | End: 2024-03-10 | Stop reason: HOSPADM

## 2024-03-08 RX ORDER — BUMETANIDE 1 MG/1
6 TABLET ORAL 2 TIMES DAILY
Status: DISCONTINUED | OUTPATIENT
Start: 2024-03-08 | End: 2024-03-10 | Stop reason: HOSPADM

## 2024-03-08 RX ORDER — INSULIN LISPRO 100 [IU]/ML
1-5 INJECTION, SOLUTION INTRAVENOUS; SUBCUTANEOUS
Status: DISCONTINUED | OUTPATIENT
Start: 2024-03-08 | End: 2024-03-10 | Stop reason: HOSPADM

## 2024-03-08 RX ORDER — BUDESONIDE 0.5 MG/2ML
0.5 INHALANT ORAL
Status: DISCONTINUED | OUTPATIENT
Start: 2024-03-08 | End: 2024-03-10 | Stop reason: HOSPADM

## 2024-03-08 RX ORDER — ACETAMINOPHEN 325 MG/1
650 TABLET ORAL EVERY 6 HOURS PRN
Status: DISCONTINUED | OUTPATIENT
Start: 2024-03-08 | End: 2024-03-10 | Stop reason: HOSPADM

## 2024-03-08 RX ORDER — ALBUTEROL SULFATE 90 UG/1
2 AEROSOL, METERED RESPIRATORY (INHALATION) EVERY 4 HOURS PRN
Status: DISCONTINUED | OUTPATIENT
Start: 2024-03-08 | End: 2024-03-10 | Stop reason: HOSPADM

## 2024-03-08 RX ORDER — BUMETANIDE 0.25 MG/ML
4 INJECTION INTRAMUSCULAR; INTRAVENOUS ONCE
Status: COMPLETED | OUTPATIENT
Start: 2024-03-08 | End: 2024-03-08

## 2024-03-08 RX ORDER — LORATADINE 10 MG/1
10 TABLET ORAL DAILY PRN
Status: DISCONTINUED | OUTPATIENT
Start: 2024-03-08 | End: 2024-03-10 | Stop reason: HOSPADM

## 2024-03-08 RX ORDER — METOPROLOL TARTRATE 1 MG/ML
5 INJECTION, SOLUTION INTRAVENOUS ONCE
Status: COMPLETED | OUTPATIENT
Start: 2024-03-08 | End: 2024-03-08

## 2024-03-08 RX ORDER — MONTELUKAST SODIUM 10 MG/1
10 TABLET ORAL
Status: DISCONTINUED | OUTPATIENT
Start: 2024-03-08 | End: 2024-03-10 | Stop reason: HOSPADM

## 2024-03-08 RX ORDER — ONDANSETRON 2 MG/ML
4 INJECTION INTRAMUSCULAR; INTRAVENOUS EVERY 6 HOURS PRN
Status: DISCONTINUED | OUTPATIENT
Start: 2024-03-08 | End: 2024-03-10 | Stop reason: HOSPADM

## 2024-03-08 RX ORDER — INSULIN GLARGINE 100 [IU]/ML
45 INJECTION, SOLUTION SUBCUTANEOUS
Status: DISCONTINUED | OUTPATIENT
Start: 2024-03-08 | End: 2024-03-10 | Stop reason: HOSPADM

## 2024-03-08 RX ORDER — METOPROLOL TARTRATE 50 MG/1
50 TABLET, FILM COATED ORAL EVERY 12 HOURS SCHEDULED
Status: DISCONTINUED | OUTPATIENT
Start: 2024-03-08 | End: 2024-03-09

## 2024-03-08 RX ADMIN — BUDESONIDE 0.5 MG: 0.5 INHALANT RESPIRATORY (INHALATION) at 19:32

## 2024-03-08 RX ADMIN — BUMETANIDE 4 MG: 0.25 INJECTION INTRAMUSCULAR; INTRAVENOUS at 15:03

## 2024-03-08 RX ADMIN — APIXABAN 5 MG: 5 TABLET, FILM COATED ORAL at 17:31

## 2024-03-08 RX ADMIN — SPIRONOLACTONE 100 MG: 25 TABLET ORAL at 17:31

## 2024-03-08 RX ADMIN — ATORVASTATIN CALCIUM 40 MG: 40 TABLET, FILM COATED ORAL at 17:31

## 2024-03-08 RX ADMIN — INSULIN LISPRO 15 UNITS: 100 INJECTION, SOLUTION INTRAVENOUS; SUBCUTANEOUS at 17:36

## 2024-03-08 RX ADMIN — METOPROLOL TARTRATE 5 MG: 1 INJECTION, SOLUTION INTRAVENOUS at 15:00

## 2024-03-08 RX ADMIN — IPRATROPIUM BROMIDE AND ALBUTEROL SULFATE 3 ML: .5; 3 SOLUTION RESPIRATORY (INHALATION) at 15:11

## 2024-03-08 RX ADMIN — TRAZODONE HYDROCHLORIDE 150 MG: 100 TABLET ORAL at 21:49

## 2024-03-08 RX ADMIN — MONTELUKAST 10 MG: 10 TABLET, FILM COATED ORAL at 21:49

## 2024-03-08 RX ADMIN — EMPAGLIFLOZIN 10 MG: 10 TABLET, FILM COATED ORAL at 17:36

## 2024-03-08 RX ADMIN — METOPROLOL TARTRATE 50 MG: 50 TABLET ORAL at 17:31

## 2024-03-08 RX ADMIN — INSULIN GLARGINE 45 UNITS: 100 INJECTION, SOLUTION SUBCUTANEOUS at 21:49

## 2024-03-08 RX ADMIN — POTASSIUM CHLORIDE 40 MEQ: 1500 TABLET, EXTENDED RELEASE ORAL at 17:31

## 2024-03-08 RX ADMIN — BUMETANIDE 6 MG: 1 TABLET ORAL at 17:31

## 2024-03-08 RX ADMIN — INSULIN LISPRO 2 UNITS: 100 INJECTION, SOLUTION INTRAVENOUS; SUBCUTANEOUS at 21:49

## 2024-03-08 RX ADMIN — DULOXETINE HYDROCHLORIDE 60 MG: 30 CAPSULE, DELAYED RELEASE ORAL at 17:31

## 2024-03-08 RX ADMIN — INSULIN LISPRO 2 UNITS: 100 INJECTION, SOLUTION INTRAVENOUS; SUBCUTANEOUS at 17:36

## 2024-03-08 NOTE — ED PROVIDER NOTES
History  Chief Complaint   Patient presents with    Atrial Fibrillation     Pt with hx of Afib. Pt seen at cardiology this AM and told to come here for her Afib     63-year-old female presents from cardiology office for the evaluation of rapid atrial fibrillation.  The patient has some mild shortness of breath with exertion which is chronic.  The patient states that she was unaware that she was in a rapid A-fib.  She has a history of atrial fibrillation and is anticoagulated on Eliquis.  She was at cardiology for a routine visit.  Patient denies any chest pain.      Atrial Fibrillation      Prior to Admission Medications   Prescriptions Last Dose Informant Patient Reported? Taking?   Blood Glucose Monitoring Suppl (Contour Next One) AILYN  Self No No   Sig: USE AS DIRECTED 3 TIMES A DAY   Patient taking differently: USE AS DIRECTED 3 TIMES A DAY    Once a day   [employer] Lancets Thin 26G MISC  Self No No   Sig: USE 3 (THREE) TIMES A DAY   Patient taking differently: Once a day   Contour Next Test test strip  Self No No   Sig: USE TO TEST BLOOD SUGAR 3 TIMES A DAY   Patient taking differently: USE TO TEST BLOOD SUGAR 3 TIMES A DAY    Once a day   DULoxetine (CYMBALTA) 60 mg delayed release capsule  Self Yes No   Sig: Take 60 mg by mouth daily   EPINEPHrine (EPIPEN) 0.3 mg/0.3 mL SOAJ  Self No No   Sig: Inject 0.3 mL (0.3 mg total) into a muscle once for 1 dose   Insulin Glargine Solostar (Lantus SoloStar) 100 UNIT/ML SOPN  Self No No   Sig: Inject 0.45 mL (45 Units total) as directed daily at bedtime Inject 45 units daily at bedtime   Insulin Pen Needle (BD Pen Needle Love 2nd Gen) 32G X 4 MM MISC  Self No No   Sig: Use daily at bedtime Use 4 new needles daily .   LORazepam (ATIVAN) 0.5 mg tablet  Self No No   Sig: TAKE 2 TABLETS BY MOUTH AT BEDTIME AND 1 EVERY 6 HOURS AS NEEDED FOR ANXIETY   albuterol (2.5 mg/3 mL) 0.083 % nebulizer solution  Self No No   Sig: Take 3 mL (2.5 mg total) by nebulization every 6 (six) hours as  needed for wheezing or shortness of breath   albuterol (PROVENTIL HFA,VENTOLIN HFA) 90 mcg/act inhaler  Self No No   Sig: Inhale 2 puffs every 4 (four) hours as needed for wheezing   apixaban (Eliquis) 5 mg  Self No No   Sig: Take 1 tablet (5 mg total) by mouth 2 (two) times a day   atorvastatin (LIPITOR) 40 mg tablet  Self No No   Sig: Take 1 tablet (40 mg total) by mouth daily   budesonide (PULMICORT) 0.5 mg/2 mL nebulizer solution  Self No No   Sig: TAKE 2 ML (0.5 MG TOTAL) BY NEBULIZATION TWICE A DAY RINSE MOUTH AFTER USE   bumetanide (BUMEX) 2 mg tablet  Self No No   Sig: Take 3 tablets (6 mg total) by mouth 2 (two) times a day   dapagliflozin (Farxiga) 10 MG tablet   No No   Sig: Take 1 tablet (10 mg total) by mouth daily   fluticasone (FLONASE) 50 mcg/act nasal spray  Self No No   Si spray into each nostril 2 (two) times a day   Patient not taking: Reported on 3/8/2024   glycopyrrolate-formoterol (BEVESPI AEROSPHERE) 9-4.8 MCG/ACT inhaler  Self No No   Sig: Inhale 2 puffs 2 (two) times a day   insulin glulisine (Apidra SoloStar) 100 units/mL injection pen  Self No No   Sig: Inject 15 Units under the skin 3 (three) times a day with meals 15 units 3 times a day with meals   levalbuterol (XOPENEX) 1.25 mg/0.5 mL nebulizer solution  Self No No   Sig: Take 0.5 mL (1.25 mg total) by nebulization 2 (two) times a day   Patient not taking: Reported on 2024   loratadine (CLARITIN) 10 mg tablet  Self No No   Sig: Take 1 tablet (10 mg total) by mouth daily as needed for allergies   metoprolol succinate (TOPROL-XL) 100 mg 24 hr tablet  Self No No   Sig: Take 1 tablet (100 mg total) by mouth daily   montelukast (SINGULAIR) 10 mg tablet  Self No No   Sig: TAKE 1 TABLET BY MOUTH EVERYDAY AT BEDTIME   naloxone (NARCAN) 4 mg/0.1 mL nasal spray  Self No No   Sig: Administer 1 spray into a nostril. If breathing does not return to normal or if breathing difficulty resumes after 2-3 minutes, give another dose in the other  nostril using a new spray.   nystatin (MYCOSTATIN) powder  Self No No   Sig: Apply topically 2 (two) times a day   omeprazole (PriLOSEC) 40 MG capsule  Self No No   Sig: Take 1 capsule (40 mg total) by mouth daily   potassium chloride (Klor-Con M20) 20 mEq tablet  Self No No   Sig: Take 2 tablets (40 mEq total) by mouth daily   spironolactone (ALDACTONE) 50 mg tablet  Self No No   Sig: Take 2 tablets (100 mg total) by mouth daily   traZODone (DESYREL) 150 mg tablet  Self No No   Sig: TAKE 1 TABLET BY MOUTH EVERYDAY AT BEDTIME      Facility-Administered Medications: None       Past Medical History:   Diagnosis Date    Acute blood loss anemia 06/01/2021    Acute diverticulitis 05/02/2021    Acute on chronic diastolic congestive heart failure (HCC) 05/21/2020    Acute on chronic respiratory failure with hypoxia (HCC) 11/30/2018    Alcohol abuse 05/21/2020    Anemia     Asthma with COPD with exacerbation  11/12/2020    Atrial fibrillation ([facility])     Cervical radiculopathy     CHF (congestive heart failure) ([facility])     Chronic low back pain     Chronic obstructive asthma 02/20/2018    Cigarette nicotine dependence without complication 11/20/2019    Quit 12/10/2019.     Community acquired pneumonia 05/21/2020    COPD exacerbation ([facility]) 09/16/2020    Depression with anxiety 03/09/2014    Diabetes mellitus with hyperglycemia ([facility])     Diverticulitis 06/06/2021    Elevated liver enzymes     GERD (gastroesophageal reflux disease)     Hypersomnolence 10/28/2020    Hypokalemia 12/04/2018    Lower gastrointestinal bleeding 06/01/2021    Paresthesia of upper extremity     Plantar fasciitis     Restless legs syndrome 04/08/2014    Severe persistent asthma with exacerbation 02/20/2018    PFTs February 8, 2018:  FEV1 0.87 L-35% predicted, 27% improvement post-bronchodilator.  DLCO-82% predicted,  The patient has been having worsening of her symptoms now for few months, especially  from January 2020, also she had multiple exacerbations  last year and most recently required a steroid burst and August  Reviewing her CT scan  New PFTs with severe obstruction reviewed  Hypersensitivity p    Sexual dysfunction     Sleep apnea, obstructive     Tenosynovitis of finger     Tinea corporis     Tobacco use 2018    Currently smoking 3-4 cigarettes daily    Trigger middle finger of left hand 2017    Trigger ring finger of left hand 2017    Weakness of upper extremity        Past Surgical History:   Procedure Laterality Date    ABDOMINAL SURGERY      CARPAL TUNNEL RELEASE Bilateral      SECTION      CHOLECYSTECTOMY      laparoscopic    ESOPHAGOGASTRODUODENOSCOPY N/A 12/3/2018    Procedure: ESOPHAGOGASTRODUODENOSCOPY (EGD) AND COLONOSCOPY;  Surgeon: Ludmila Perry MD;  Location: [facility];  Service: Gastroenterology    GASTRIC BYPASS      for morbid obesity with gilda en y    HERNIA REPAIR      HYSTERECTOMY      MD EXCISION GANGLION WRIST DORSAL/VOLAR PRIMARY Left 2017    Procedure: LONG FINGER GANGLION CYST EXCISION;  Surgeon: Philipep Clark MD;  Location: [facility];  Service: Orthopedics    MD TENDON SHEATH INCISION Left 2017    Procedure: THUMB, LONG, RING, TRIGGER FINGER RELEASE;  Surgeon: Philippe Clark MD;  Location: [facility];  Service: Orthopedics    SHOULDER SURGERY Right        Family History   Problem Relation Age of Onset    Alzheimer's disease Mother     Atrial fibrillation Mother     Dementia Mother     Heart disease Mother         heart problem    Seizures Mother     Parkinsonism Father     Arthritis Father     Atrial fibrillation Father     No Known Problems Daughter     Cri-du-chat syndrome Daughter     Colon cancer Maternal Grandmother 80    Diabetes type II Maternal Grandmother     No Known Problems Brother     No Known Problems Son     Substance Abuse Neg Hx         mother,father    Mental illness Neg Hx         mother,father    Colon polyps Neg Hx      I have reviewed and agree with the history  as documented.    E-Cigarette/Vaping    E-Cigarette Use Never User      E-Cigarette/Vaping Substances    Nicotine No     THC No     CBD No     Flavoring No     Other No     Unknown No      Social History     Tobacco Use    Smoking status: Former     Current packs/day: 0.00     Average packs/day: 0.3 packs/day for 29.9 years (7.5 ttl pk-yrs)     Types: Cigarettes     Start date:      Quit date: 12/10/2019     Years since quittin.2    Smokeless tobacco: Never   Vaping Use    Vaping status: Never Used   Substance Use Topics    Alcohol use: Not Currently     Alcohol/week: 20.0 standard drinks of alcohol     Types: 20 Cans of beer per week     Comment: about 6 coors light every night, stopped in 2019    Drug use: No       Review of Systems    Physical Exam  Physical Exam    Vital Signs  ED Triage Vitals   Temperature Pulse Respirations Blood Pressure SpO2   24 1420 24 1420 24 1420 24 1420 24 1420   98.1 °F (36.7 °C) (!) 117 22 (!) 165/101 92 %      Temp Source Heart Rate Source Patient Position - Orthostatic VS BP Location FiO2 (%)   24 2144 24 1420 24 1420 24 1420 --   Axillary Monitor Sitting Right arm       Pain Score       24 1420       No Pain           Vitals:    24 1745 24 1947 24 2144 03/10/24 0720   BP:  115/66 119/66 121/68   Pulse: (!) 119 92 88 96   Patient Position - Orthostatic VS:   Lying          Visual Acuity      ED Medications  Medications   metoprolol (LOPRESSOR) injection 5 mg (5 mg Intravenous Given 3/8/24 1500)   bumetanide (BUMEX) injection 4 mg (4 mg Intravenous Given 3/8/24 1503)   ipratropium-albuterol (DUO-NEB) 0.5-2.5 mg/3 mL inhalation solution 3 mL (3 mL Nebulization Given 3/8/24 1511)       Diagnostic Studies  Results Reviewed       Procedure Component Value Units Date/Time    Blood culture [[id number]] Collected: 24 182    Lab Status: Preliminary result Specimen: Blood from Arm, Left Updated:  03/11/24 0001     Blood Culture No Growth at 48 hrs.    Blood culture [[id number]] Collected: 03/08/24 1828    Lab Status: Preliminary result Specimen: Blood from Arm, Right Updated: 03/11/24 0001     Blood Culture No Growth at 48 hrs.    COVID/FLU/RSV [[id number]]  (Normal) Collected: 03/08/24 1828    Lab Status: Final result Specimen: Nares from Nose Updated: 03/08/24 1924     SARS-CoV-2 Negative     INFLUENZA A PCR Negative     INFLUENZA B PCR Negative     RSV PCR Negative    Narrative:      FOR PEDIATRIC PATIENTS - copy/paste COVID Guidelines URL to browser: [email or web address]    SARS-CoV-2 assay is a Nucleic Acid Amplification assay intended for the  qualitative detection of nucleic acid from SARS-CoV-2 in nasopharyngeal  swabs. Results are for the presumptive identification of SARS-CoV-2 RNA.    Positive results are indicative of infection with SARS-CoV-2, the virus  causing COVID-19, but do not rule out bacterial infection or co-infection  with other viruses. Laboratories within the [place] and its  territories are required to report all positive results to the appropriate  public health authorities. Negative results do not preclude SARS-CoV-2  infection and should not be used as the sole basis for treatment or other  patient management decisions. Negative results must be combined with  clinical observations, patient history, and epidemiological information.  This test has not been FDA cleared or approved.    This test has been authorized by FDA under an Emergency Use Authorization  (EUA). This test is only authorized for the duration of time the  declaration that circumstances exist justifying the authorization of the  emergency use of an in vitro diagnostic tests for detection of SARS-CoV-2  virus and/or diagnosis of COVID-19 infection under section 564(b)(1) of  the Act, 21 U.S.C. 360bbb-3(b)(1), unless the authorization is terminated  or revoked  sooner. The test has been validated but independent review by FDA  and CLIA is pending.    Test performed using [employer] GeneXpert: This RT-PCR assay targets N2,  a region unique to SARS-CoV-2. A conserved region in the E-gene was chosen  for pan-Sarbecovirus detection which includes SARS-CoV-2.    According to CMS-2020-01-R, this platform meets the definition of high-throughput technology.    HS Troponin I 2hr [[id number]]  (Normal) Collected: 03/08/24 1828    Lab Status: Final result Specimen: Blood from Arm, Left Updated: 03/08/24 1903     hs TnI 2hr 4 ng/L      Delta 2hr hsTnI -1 ng/L     B-Type Natriuretic Peptide(BNP) [[id number]]  (Normal) Collected: 03/08/24 1448    Lab Status: Final result Specimen: Blood from Arm, Left Updated: 03/08/24 1734     BNP 96 pg/mL     TSH, 3rd generation [[id number]]  (Normal) Collected: 03/08/24 1448    Lab Status: Final result Specimen: Blood from Arm, Left Updated: 03/08/24 1638     TSH 3RD GENERATON 2.131 uIU/mL     Magnesium [[id number]]  (Normal) Collected: 03/08/24 1448    Lab Status: Final result Specimen: Blood from Arm, Left Updated: 03/08/24 1555     Magnesium 2.6 mg/dL     Urine Microscopic [[id number]]  (Normal) Collected: 03/08/24 1510    Lab Status: Final result Specimen: Urine, Clean Catch Updated: 03/08/24 1536     RBC, UA None Seen /hpf      WBC, UA 2-4 /hpf      Epithelial Cells Occasional /hpf      Bacteria, UA Occasional /hpf     UA w Reflex to Microscopic w Reflex to Culture [[id number]]  (Abnormal) Collected: 03/08/24 1510    Lab Status: Final result Specimen: Urine, Clean Catch Updated: 03/08/24 1535     Color, UA Yellow     Clarity, UA Clear     Specific Gravity, UA 1.010     pH, UA 5.0     Leukocytes, UA Small     Nitrite, UA Negative     Protein, UA Negative mg/dl      Glucose, UA 1000 (1%) mg/dl      Ketones, UA Negative mg/dl      Urobilinogen, UA <2.0 mg/dl      Bilirubin, UA Negative     Occult Blood, UA Trace    HS Troponin 0hr (reflex protocol)  [[id number]]  (Normal) Collected: 03/08/24 1448    Lab Status: Final result Specimen: Blood from Arm, Left Updated: 03/08/24 1522     hs TnI 0hr 5 ng/L     Comprehensive metabolic panel [[id number]]  (Abnormal) Collected: 03/08/24 1448    Lab Status: Final result Specimen: Blood from Arm, Left Updated: 03/08/24 1514     Sodium 133 mmol/L      Potassium 4.7 mmol/L      Chloride 90 mmol/L      CO2 33 mmol/L      ANION GAP 10 mmol/L      BUN 26 mg/dL      Creatinine 0.74 mg/dL      Glucose 159 mg/dL      Calcium 8.7 mg/dL      AST 22 U/L      ALT 13 U/L      Alkaline Phosphatase 137 U/L      Total Protein 7.1 g/dL      Albumin 4.2 g/dL      Total Bilirubin 0.39 mg/dL      eGFR 85 ml/min/1.73sq m     Narrative:      National Kidney Disease Foundation guidelines for Chronic Kidney Disease (CKD):     Stage 1 with normal or high GFR (GFR > 90 mL/min/1.73 square meters)    Stage 2 Mild CKD (GFR = 60-89 mL/min/1.73 square meters)    Stage 3A Moderate CKD (GFR = 45-59 mL/min/1.73 square meters)    Stage 3B Moderate CKD (GFR = 30-44 mL/min/1.73 square meters)    Stage 4 Severe CKD (GFR = 15-29 mL/min/1.73 square meters)    Stage 5 End Stage CKD (GFR <15 mL/min/1.73 square meters)  Note: GFR calculation is accurate only with a steady state creatinine    CBC and differential [[id number]]  (Abnormal) Collected: 03/08/24 1448    Lab Status: Final result Specimen: Blood from Arm, Left Updated: 03/08/24 1500     WBC 16.55 Thousand/uL      RBC 5.92 Million/uL      Hemoglobin 11.0 g/dL      Hematocrit 39.2 %      MCV 66 fL      MCH 18.6 pg      MCHC 28.1 g/dL      RDW 19.2 %      Platelets 362 Thousands/uL      nRBC 0 /100 WBCs      Neutrophils Relative 76 %      Immat GRANS % 1 %      Lymphocytes Relative 16 %      Monocytes Relative 7 %      Eosinophils Relative 0 %      Basophils Relative 0 %      Neutrophils Absolute 12.55 Thousands/µL      Immature Grans Absolute 0.09 Thousand/uL      Lymphocytes Absolute 2.71 Thousands/µL       Monocytes Absolute 1.16 Thousand/µL      Eosinophils Absolute 0.00 Thousand/µL      Basophils Absolute 0.04 Thousands/µL                    XR chest 1 view portable   ED Interpretation by Ivan Conklin DO (03/08 1522)   Stable cardiomegaly and stable pulmonary vascular congestion when compared to previous      Final Result by Skyler Casarez DO (03/08 1544)      Mild pulmonary venous congestion, similar to previous study.               Resident: CHRISTINA RODNEY I, the attending radiologist, have reviewed the images and agree with the final report above.      Workstation performed: [id number]                    Procedures  ECG 12 Lead Documentation Only    Date/Time: 3/8/2024 2:49 PM    Performed by: Ivan Conklin DO  Authorized by: Ivan Conklin DO    Indications / Diagnosis:  Dyspnea  ECG reviewed by me, the ED Provider: yes    Patient location:  ED  Previous ECG:     Previous ECG:  Compared to current    Comparison ECG info:  2/26/24    Similarity:  No change  Interpretation:     Interpretation: abnormal    Rate:     ECG rate:  134    ECG rate assessment: tachycardic    Rhythm:     Rhythm: atrial flutter    Ectopy:     Ectopy: none    ST segments:     ST segments:  Normal  T waves:     T waves: normal             ED Course  ED Course as of 03/11/24 1055   Fri Mar 08, 2024   1458 Discussed case with Dr. Schneider and cardiology who agree with plan for observation admit for rate control and duiretic managment                                             Medical Decision Making  DDx: rapid afib, COPD exacerbation, CHF exacerbation,dehydration    Plan for EKG, CXR, duoneb, labs.  Patient with get IV rate controlling medication as well duiretics with plan for admit        Amount and/or Complexity of Data Reviewed  Labs: ordered.  Radiology: ordered and independent interpretation performed.    Risk  Prescription drug management.  Decision regarding hospitalization.             Disposition  Final  diagnoses:   Atrial fibrillation with rapid ventricular response (HCC)   Acute on chronic diastolic heart failure (HCC)   COPD, severe (HCC)     Time reflects when diagnosis was documented in both MDM as applicable and the Disposition within this note       Time User Action Codes Description Comment    3/8/2024  2:58 PM Ivan Conklin Add [I48.91] Atrial fibrillation with rapid ventricular response (HCC)     3/8/2024  2:59 PM Ivan Conklin Add [I50.33] Acute on chronic diastolic heart failure (HCC)     3/8/2024  3:00 PM Ivan Conklin Add [J44.9] COPD, severe (HCC)           ED Disposition       ED Disposition   Admit    Condition   Stable    Date/Time   Fri Mar 8, 2024  3:00 PM    Comment   Case was discussed with Wyatt and the patient's admission status was agreed to be Admission Status: observation status to the service of Dr. Schneider .               Follow-up Information    None         Discharge Medication List as of 3/10/2024 11:08 AM        START taking these medications    Details   diltiazem (CARDIZEM CD) 120 mg 24 hr capsule Take 1 capsule (120 mg total) by mouth daily Do not start before March 11, 2024., Starting Mon 3/11/2024, Until Wed 4/10/2024, Normal           CONTINUE these medications which have NOT CHANGED    Details   albuterol (2.5 mg/3 mL) 0.083 % nebulizer solution Take 3 mL (2.5 mg total) by nebulization every 6 (six) hours as needed for wheezing or shortness of breath, Starting Thu 6/8/2023, Normal      albuterol (PROVENTIL HFA,VENTOLIN HFA) 90 mcg/act inhaler Inhale 2 puffs every 4 (four) hours as needed for wheezing, Starting Thu 6/8/2023, Normal      apixaban (Eliquis) 5 mg Take 1 tablet (5 mg total) by mouth 2 (two) times a day, Starting Wed 12/6/2023, Normal      atorvastatin (LIPITOR) 40 mg tablet Take 1 tablet (40 mg total) by mouth daily, Starting Wed 12/6/2023, Normal      Blood Glucose Monitoring Suppl (Contour Next One) AILYN USE AS DIRECTED 3 TIMES A DAY, Normal       budesonide (PULMICORT) 0.5 mg/2 mL nebulizer solution TAKE 2 ML (0.5 MG TOTAL) BY NEBULIZATION TWICE A DAY RINSE MOUTH AFTER USE, Normal      bumetanide (BUMEX) 2 mg tablet Take 3 tablets (6 mg total) by mouth 2 (two) times a day, Starting Wed 3/6/2024, Until Fri 4/5/2024, Normal      Contour Next Test test strip USE TO TEST BLOOD SUGAR 3 TIMES A DAY, Normal      [employer] Lancets Thin 26G MISC USE 3 (THREE) TIMES A DAY, Normal      dapagliflozin (Farxiga) 10 MG tablet Take 1 tablet (10 mg total) by mouth daily, Starting Fri 3/8/2024, Normal      DULoxetine (CYMBALTA) 60 mg delayed release capsule Take 60 mg by mouth daily, Starting Wed 2/21/2024, Historical Med      EPINEPHrine (EPIPEN) 0.3 mg/0.3 mL SOAJ Inject 0.3 mL (0.3 mg total) into a muscle once for 1 dose, Starting Thu 10/19/2023, Normal      fluticasone (FLONASE) 50 mcg/act nasal spray 1 spray into each nostril 2 (two) times a day, Starting Wed 9/23/2020, Normal      glycopyrrolate-formoterol (BEVESPI AEROSPHERE) 9-4.8 MCG/ACT inhaler Inhale 2 puffs 2 (two) times a day, Starting Tue 2/20/2024, Normal      Insulin Glargine Solostar (Lantus SoloStar) 100 UNIT/ML SOPN Inject 0.45 mL (45 Units total) as directed daily at bedtime Inject 45 units daily at bedtime, Starting Wed 3/6/2024, Normal      insulin glulisine (Apidra SoloStar) 100 units/mL injection pen Inject 15 Units under the skin 3 (three) times a day with meals 15 units 3 times a day with meals, Starting Wed 3/6/2024, Normal      Insulin Pen Needle (BD Pen Needle Love 2nd Gen) 32G X 4 MM MISC Use daily at bedtime Use 4 new needles daily ., Starting Wed 12/6/2023, Normal      levalbuterol (XOPENEX) 1.25 mg/0.5 mL nebulizer solution Take 0.5 mL (1.25 mg total) by nebulization 2 (two) times a day, Starting Mon 11/16/2020, Normal      loratadine (CLARITIN) 10 mg tablet Take 1 tablet (10 mg total) by mouth daily as needed for allergies, Starting Fri 2/9/2024, Normal      LORazepam (ATIVAN) 0.5 mg tablet TAKE  2 TABLETS BY MOUTH AT BEDTIME AND 1 EVERY 6 HOURS AS NEEDED FOR ANXIETY, Normal      metoprolol succinate (TOPROL-XL) 100 mg 24 hr tablet Take 1 tablet (100 mg total) by mouth daily, Starting Wed 3/6/2024, Normal      montelukast (SINGULAIR) 10 mg tablet TAKE 1 TABLET BY MOUTH EVERYDAY AT BEDTIME, Starting Sat 11/11/2023, Normal      naloxone (NARCAN) 4 mg/0.1 mL nasal spray Administer 1 spray into a nostril. If breathing does not return to normal or if breathing difficulty resumes after 2-3 minutes, give another dose in the other nostril using a new spray., Normal      nystatin (MYCOSTATIN) powder Apply topically 2 (two) times a day, Starting Fri 2/9/2024, Normal      omeprazole (PriLOSEC) 40 MG capsule Take 1 capsule (40 mg total) by mouth daily, Starting Fri 2/9/2024, Normal      potassium chloride (Klor-Con M20) 20 mEq tablet Take 2 tablets (40 mEq total) by mouth daily, Starting Mon 2/12/2024, Normal      spironolactone (ALDACTONE) 50 mg tablet Take 2 tablets (100 mg total) by mouth daily, Starting Wed 3/6/2024, Until Fri 4/5/2024, Normal      traZODone (DESYREL) 150 mg tablet TAKE 1 TABLET BY MOUTH EVERYDAY AT BEDTIME, Starting Sat 2/17/2024, Normal             No discharge procedures on file.    PDMP Review         Value Time User    PDMP Reviewed  Yes 3/6/2024 10:06 AM Hawa Marsh MD            ED Provider  Electronically Signed by             Ivan Conklin DO  03/11/24 [id number]

## 2024-03-08 NOTE — ED NOTES
Patient reports needing to use the bathroom. Primary RN suggested purewick in response to current heart rate. Patient refused and insists on getting out of bed. Provider aware.      Brandie Livingston RN  03/08/24 [id number]

## 2024-03-08 NOTE — ASSESSMENT & PLAN NOTE
Chronically on 4 L of oxygen  Uses BiPAP at nighttime  Attributed to underlying severe COPD, and chronic diastolic heart failure  Continue with respiratory protocol

## 2024-03-08 NOTE — ED NOTES
Primary RN sent report to Linda BROWNING on MS 3 via Identified.      Brandie Livingston RN  03/08/24 [id number]

## 2024-03-08 NOTE — ASSESSMENT & PLAN NOTE
Wt Readings from Last 3 Encounters:   03/08/24 122 kg (269 lb)   03/06/24 121 kg (266 lb)   02/26/24 127 kg (279 lb)     Recently discharged on 3/6, due to acute on chronic diastolic heart failure  Last echo with EF of 65%, grade 2 diastolic dysfunction, right ventricular systolic pressure of 61  Currently appears euvolemic  Continue with Bumex 6 mg twice daily  Continue with Aldactone 100 mg daily  Continue with Farxiga 10 mg daily  Salt restriction, fluid restriction, strict I's and O's and daily weights  Cardiology following, appreciate recommendations

## 2024-03-08 NOTE — H&P
Formerly [facility], later [facility]  H&P  Name: Mattie Joy 63 y.o. female I MRN: [id number]  Unit/Bed#: ED 08 I Date of Admission: 3/8/2024   Date of Service: 3/8/2024 I Hospital Day: 0      Assessment/Plan   Chronic respiratory failure with hypoxia and hypercapnia (HCC)  Assessment & Plan  Chronically on 4 L of oxygen  Uses BiPAP at nighttime  Attributed to underlying severe COPD, and chronic diastolic heart failure  Continue with respiratory protocol    * Paroxysmal atrial fibrillation with RVR  Assessment & Plan  Patient with chronic diastolic heart failure, noted to have heart rate in 130s at cardiology office 3/8  At risk of decompensated heart failure given paroxysmal atrial fibrillation with RVR    Continue with telemetry monitoring  Optimize electrolytes as needed  Home regimen Toprol  mg daily and Eliquis 5 mg twice daily  Continue to hold Toprol-XL, start Lopressor 50 twice daily  Continue with Eliquis for anticoagulation  Appreciate cardiology assistance    Chronic diastolic CHF (congestive heart failure) (HCC)  Assessment & Plan  Wt Readings from Last 3 Encounters:   03/08/24 122 kg (269 lb)   03/06/24 121 kg (266 lb)   02/26/24 127 kg (279 lb)     Recently discharged on 3/6, due to acute on chronic diastolic heart failure  Last echo with EF of 65%, grade 2 diastolic dysfunction, right ventricular systolic pressure of 61  Currently appears euvolemic  Continue with Bumex 6 mg twice daily  Continue with Aldactone 100 mg daily  Continue with Farxiga 10 mg daily  Salt restriction, fluid restriction, strict I's and O's and daily weights  Cardiology following, appreciate recommendations          COPD, severe (HCC)  Assessment & Plan  Follows with pulmonology  Continue with albuterol inhaler/nebulizer as needed, Pulmicort as needed, Claritin, Singulair   Respiratory protocol  Currently at baseline oxygen requirement 4 L    Morbid obesity (HCC)  Assessment & Plan  Lifestyle modifications  advised    Type 2 diabetes mellitus with stage 2 chronic kidney disease, with long-term current use of insulin (HCC)  Assessment & Plan  Lab Results   Component Value Date    HGBA1C 8.5 (H) 01/22/2024       Recent Labs     03/05/24  1627 03/05/24  2101 03/06/24  0755 03/06/24  1154   POCGLU 226* 159* 191* 301*       Blood Sugar Average: Last 72 hrs:    Continue with home regimen of Lantus 45 units at bedtime, insulin lispro 15 units 3 times daily AC  Additional coverage with insulin on sliding scale  Carb consistent diet    Hypercholesterolemia  Assessment & Plan  Continue with atorvastatin 40 mg daily at bedtime    Obstructive sleep apnea  Assessment & Plan  Continue with BiPAP nightly    Esophageal reflux  Assessment & Plan  Continue with pantoprazole 40 mg daily           VTE Pharmacologic Prophylaxis:   Moderate Risk (Score 3-4) - Pharmacological DVT Prophylaxis Ordered: apixaban (Eliquis).  Code Status: Level 1 - Full Code patient  Discussion with family:  not available .     Anticipated Length of Stay: Patient will be admitted on an observation basis with an anticipated length of stay of less than 2 midnights secondary to afib with rvr .    Total Time Spent on Date of Encounter in care of patient: 75 mins. This time was spent on one or more of the following: performing physical exam; counseling and coordination of care; obtaining or reviewing history; documenting in the medical record; reviewing/ordering tests, medications or procedures; communicating with other healthcare professionals and discussing with patient's family/caregivers.    Chief Complaint:   Chief Complaint   Patient presents with    Atrial Fibrillation     Pt with hx of Afib. Pt seen at cardiology this AM and told to come here for her Afib        History of Present Illness:  Mattie Joy is a 63 y.o. female with a PMH of of paroxysmal atrial fibrillation, hypertension, hyperlipidemia, severe COPD, chronic respiratory failure on 4 L of oxygen,  chronic diastolic heart failure, medication noncompliance, type 2 diabetes on insulin presented with rapid heart rate.  Patient was seen at cardiology office, and was found to have heart rate in 130s.  Patient being at high risk for decompensated heart failure, was sent to ED for further workup and adjustment of her medications.  Arrival in ED, hemodynamically stable, anxious with heart rate in 120s.  Given Lopressor 5 mg.  Will be admitted under observation status on telemetry floor for further management and care    Review of Systems:  Review of Systems   Constitutional:  Positive for activity change and fatigue. Negative for chills and fever.   Respiratory:  Negative for cough and shortness of breath.    Cardiovascular:  Positive for palpitations and leg swelling. Negative for chest pain.   Gastrointestinal:  Negative for abdominal pain.   Genitourinary:  Negative for dysuria and urgency.   Musculoskeletal:  Positive for arthralgias.   Neurological:  Negative for dizziness, weakness and light-headedness.   Psychiatric/Behavioral:  Negative for agitation and confusion. The patient is nervous/anxious.        Past Medical and Surgical History:   Past Medical History:   Diagnosis Date    Acute blood loss anemia 06/01/2021    Acute diverticulitis 05/02/2021    Acute on chronic diastolic congestive heart failure (HCC) 05/21/2020    Acute on chronic respiratory failure with hypoxia (HCC) 11/30/2018    Alcohol abuse 05/21/2020    Anemia     Asthma with COPD with exacerbation  11/12/2020    Atrial fibrillation (HCC)     Cervical radiculopathy     CHF (congestive heart failure) (HCC)     Chronic low back pain     Chronic obstructive asthma 02/20/2018    Cigarette nicotine dependence without complication 11/20/2019    Quit 12/10/2019.     Community acquired pneumonia 05/21/2020    COPD exacerbation (HCC) 09/16/2020    Depression with anxiety 03/09/2014    Diabetes mellitus with hyperglycemia (HCC)     Diverticulitis  2021    Elevated liver enzymes     GERD (gastroesophageal reflux disease)     Hypersomnolence 10/28/2020    Hypokalemia 2018    Lower gastrointestinal bleeding 2021    Paresthesia of upper extremity     Plantar fasciitis     Restless legs syndrome 2014    Severe persistent asthma with exacerbation 2018    PFTs 2018:  FEV1 0.87 L-35% predicted, 27% improvement post-bronchodilator.  DLCO-82% predicted,  The patient has been having worsening of her symptoms now for few months, especially  from 2020, also she had multiple exacerbations last year and most recently required a steroid burst and August  Reviewing her CT scan  New PFTs with severe obstruction reviewed  Hypersensitivity p    Sexual dysfunction     Sleep apnea, obstructive     Tenosynovitis of finger     Tinea corporis     Tobacco use 2018    Currently smoking 3-4 cigarettes daily    Trigger middle finger of left hand 2017    Trigger ring finger of left hand 2017    Weakness of upper extremity        Past Surgical History:   Procedure Laterality Date    ABDOMINAL SURGERY      CARPAL TUNNEL RELEASE Bilateral      SECTION      CHOLECYSTECTOMY      laparoscopic    ESOPHAGOGASTRODUODENOSCOPY N/A 12/3/2018    Procedure: ESOPHAGOGASTRODUODENOSCOPY (EGD) AND COLONOSCOPY;  Surgeon: Ludmila Perry MD;  Location: [facility];  Service: Gastroenterology    GASTRIC BYPASS      for morbid obesity with gilda en y    HERNIA REPAIR      HYSTERECTOMY      SC EXCISION GANGLION WRIST DORSAL/VOLAR PRIMARY Left 2017    Procedure: LONG FINGER GANGLION CYST EXCISION;  Surgeon: Philippe Clark MD;  Location: [facility];  Service: Orthopedics    SC TENDON SHEATH INCISION Left 2017    Procedure: THUMB, LONG, RING, TRIGGER FINGER RELEASE;  Surgeon: Philippe Clark MD;  Location: [facility];  Service: Orthopedics    SHOULDER SURGERY Right        Meds/Allergies:  Prior to Admission medications     Medication Sig Start Date End Date Taking? Authorizing Provider   albuterol (2.5 mg/3 mL) 0.083 % nebulizer solution Take 3 mL (2.5 mg total) by nebulization every 6 (six) hours as needed for wheezing or shortness of breath 6/8/23   Tin Brennan MD   albuterol (PROVENTIL HFA,VENTOLIN HFA) 90 mcg/act inhaler Inhale 2 puffs every 4 (four) hours as needed for wheezing 6/8/23   Tin Brennan MD   apixaban (Eliquis) 5 mg Take 1 tablet (5 mg total) by mouth 2 (two) times a day 12/6/23   Jovanni Pacheco MD   atorvastatin (LIPITOR) 40 mg tablet Take 1 tablet (40 mg total) by mouth daily 12/6/23   Jovanni Pacheco MD   Blood Glucose Monitoring Suppl (Contour Next One) AILYN USE AS DIRECTED 3 TIMES A DAY  Patient taking differently: USE AS DIRECTED 3 TIMES A DAY    Once a day 10/30/23   Laith Olivares MD   budesonide (PULMICORT) 0.5 mg/2 mL nebulizer solution TAKE 2 ML (0.5 MG TOTAL) BY NEBULIZATION TWICE A DAY RINSE MOUTH AFTER USE 10/30/23   Tin Brennan MD   bumetanide (BUMEX) 2 mg tablet Take 3 tablets (6 mg total) by mouth 2 (two) times a day 3/6/24 4/5/24  Hawa Marsh MD   Contour Next Test test strip USE TO TEST BLOOD SUGAR 3 TIMES A DAY  Patient taking differently: USE TO TEST BLOOD SUGAR 3 TIMES A DAY    Once a day 10/29/23   Laith Olivares MD   [employer] Lancets Thin 26G MISC USE 3 (THREE) TIMES A DAY  Patient taking differently: Once a day 5/4/22   Laith Olivares MD   dapagliflozin (Farxiga) 10 MG tablet Take 1 tablet (10 mg total) by mouth daily 3/8/24   Jovanni Pacheco MD   DULoxetine (CYMBALTA) 60 mg delayed release capsule Take 60 mg by mouth daily 2/21/24   Historical Provider, MD   EPINEPHrine (EPIPEN) 0.3 mg/0.3 mL SOAJ Inject 0.3 mL (0.3 mg total) into a muscle once for 1 dose 10/19/23 3/8/24  VICKY Crow   fluticasone (FLONASE) 50 mcg/act nasal spray 1 spray into each nostril 2 (two) times a day  Patient not taking: Reported on 3/8/2024 9/23/20   Tin WESTON  MD Mika   glycopyrrolate-formoterol (BEVESPI AEROSPHERE) 9-4.8 MCG/ACT inhaler Inhale 2 puffs 2 (two) times a day 2/20/24   Suyapa España PA-C   Insulin Glargine Solostar (Lantus SoloStar) 100 UNIT/ML SOPN Inject 0.45 mL (45 Units total) as directed daily at bedtime Inject 45 units daily at bedtime 3/6/24   Hawa Marsh MD   insulin glulisine (Apidra SoloStar) 100 units/mL injection pen Inject 15 Units under the skin 3 (three) times a day with meals 15 units 3 times a day with meals 3/6/24   Hawa Marsh MD   Insulin Pen Needle (BD Pen Needle Love 2nd Gen) 32G X 4 MM MISC Use daily at bedtime Use 4 new needles daily . 12/6/23   Lobito Alfredo PA-C   levalbuterol (XOPENEX) 1.25 mg/0.5 mL nebulizer solution Take 0.5 mL (1.25 mg total) by nebulization 2 (two) times a day  Patient not taking: Reported on 2/26/2024 11/16/20   Irma Friedman DO   loratadine (CLARITIN) 10 mg tablet Take 1 tablet (10 mg total) by mouth daily as needed for allergies 2/9/24   Hawa Marsh MD   LORazepam (ATIVAN) 0.5 mg tablet TAKE 2 TABLETS BY MOUTH AT BEDTIME AND 1 EVERY 6 HOURS AS NEEDED FOR ANXIETY 1/19/24   Laith Olivares MD   metoprolol succinate (TOPROL-XL) 100 mg 24 hr tablet Take 1 tablet (100 mg total) by mouth daily 3/6/24   Jovanni Pacheco MD   montelukast (SINGULAIR) 10 mg tablet TAKE 1 TABLET BY MOUTH EVERYDAY AT BEDTIME 11/11/23   Laith Olivares MD   naloxone (NARCAN) 4 mg/0.1 mL nasal spray Administer 1 spray into a nostril. If breathing does not return to normal or if breathing difficulty resumes after 2-3 minutes, give another dose in the other nostril using a new spray. 7/29/20   Laith Olivares MD   nystatin (MYCOSTATIN) powder Apply topically 2 (two) times a day 2/9/24   Hawa Marsh MD   omeprazole (PriLOSEC) 40 MG capsule Take 1 capsule (40 mg total) by mouth daily 2/9/24   Laith Olivares MD   potassium chloride (Klor-Con M20) 20 mEq tablet Take 2 tablets (40 mEq total) by mouth daily  24   Jovanni Pacheco MD   spironolactone (ALDACTONE) 50 mg tablet Take 2 tablets (100 mg total) by mouth daily 3/6/24 4/5/24  Hawa Marsh MD   traZODone (DESYREL) 150 mg tablet TAKE 1 TABLET BY MOUTH EVERYDAY AT BEDTIME 24   Laith Olivares MD   Empagliflozin (JARDIANCE) 10 MG TABS tablet Take 1 tablet (10 mg total) by mouth daily 2/10/24 3/8/24  Hawa Marsh MD     I have reviewed home medications with patient personally.    Allergies:   Allergies   Allergen Reactions    Iron Dextran Swelling    Januvia [Sitagliptin] Shortness Of Breath    Jardiance [Empagliflozin] Shortness Of Breath    Clonazepam Other (See Comments)     Unknown reaction    Codeine Itching and Other (See Comments)     itch;mary kay morphine,takes ultram @home    Adhesive [Medical Tape] Itching     itching    Effexor [Venlafaxine] Itching    Lactose - Food Allergy GI Intolerance    Oxycodone-Acetaminophen Anxiety    Oxycodone-Acetaminophen Itching and Rash     Other reaction(s): Other (See Comments)  (PERCOCET) MILD RASH, not allergic to Acetaminophen        Social History:  Marital Status: Significant Other     Substance Use History:   Social History     Substance and Sexual Activity   Alcohol Use Not Currently    Alcohol/week: 20.0 standard drinks of alcohol    Types: 20 Cans of beer per week    Comment: about 6 coors light every night, stopped in      Social History     Tobacco Use   Smoking Status Former    Current packs/day: 0.00    Average packs/day: 0.3 packs/day for 29.9 years (7.5 ttl pk-yrs)    Types: Cigarettes    Start date:     Quit date: 12/10/2019    Years since quittin.2   Smokeless Tobacco Never     Social History     Substance and Sexual Activity   Drug Use No       Family History:  Family History   Problem Relation Age of Onset    Alzheimer's disease Mother     Atrial fibrillation Mother     Dementia Mother     Heart disease Mother         heart problem    Seizures Mother     Parkinsonism Father      Arthritis Father     Atrial fibrillation Father     No Known Problems Daughter     Cri-du-chat syndrome Daughter     Colon cancer Maternal Grandmother 80    Diabetes type II Maternal Grandmother     No Known Problems Brother     No Known Problems Son     Substance Abuse Neg Hx         mother,father    Mental illness Neg Hx         mother,father    Colon polyps Neg Hx        Physical Exam:     Vitals:   Blood Pressure: 130/79 (03/08/24 1530)  Pulse: (!) 117 (03/08/24 1530)  Temperature: 98.1 °F (36.7 °C) (03/08/24 1420)  Respirations: 20 (03/08/24 1530)  SpO2: 99 % (03/08/24 1530)    Physical Exam  Vitals reviewed.   Constitutional:       Appearance: She is obese.   HENT:      Head: Atraumatic.      Mouth/Throat:      Mouth: Mucous membranes are dry.   Eyes:      General: No scleral icterus.  Cardiovascular:      Rate and Rhythm: Tachycardia present. Rhythm irregular.      Heart sounds: Normal heart sounds.   Pulmonary:      Effort: No respiratory distress.      Comments: 4 litres  Abdominal:      General: Bowel sounds are normal.   Musculoskeletal:      Right lower leg: Edema present.      Left lower leg: Edema present.   Skin:     General: Skin is dry.      Capillary Refill: Capillary refill takes less than 2 seconds.   Neurological:      Mental Status: She is alert. Mental status is at baseline.   Psychiatric:         Mood and Affect: Mood normal.          Additional Data:     Lab Results:  Results from last 7 days   Lab Units 03/08/24  1448   WBC Thousand/uL 16.55*   HEMOGLOBIN g/dL 11.0*   HEMATOCRIT % 39.2   PLATELETS Thousands/uL 362   NEUTROS PCT % 76*   LYMPHS PCT % 16   MONOS PCT % 7   EOS PCT % 0     Results from last 7 days   Lab Units 03/08/24  1448   SODIUM mmol/L 133*   POTASSIUM mmol/L 4.7   CHLORIDE mmol/L 90*   CO2 mmol/L 33*   BUN mg/dL 26*   CREATININE mg/dL 0.74   ANION GAP mmol/L 10   CALCIUM mg/dL 8.7   ALBUMIN g/dL 4.2   TOTAL BILIRUBIN mg/dL 0.39   ALK PHOS U/L 137*   ALT U/L 13   AST U/L 22    GLUCOSE RANDOM mg/dL 159*         Results from last 7 days   Lab Units 03/06/24  1154 03/06/24  0755 03/05/24  2101 03/05/24  1627 03/05/24  1137 03/05/24  0636 03/04/24  2107 03/04/24  1725 03/04/24  1124 03/04/24  0743 03/03/24  2141 03/03/24  1605   POC GLUCOSE mg/dl 301* 191* 159* 226* 285* 170* 177* 175* 176* 140 112 154*               Lines/Drains:  Invasive Devices       Peripheral Intravenous Line  Duration             Peripheral IV 03/05/24 Distal;Right;Ventral (anterior) Forearm 3 days                        Imaging: Reviewed radiology reports from this admission including: chest xray  XR chest 1 view portable   ED Interpretation by Ivan Conklin DO (03/08 1522)   Stable cardiomegaly and stable pulmonary vascular congestion when compared to previous          EKG and Other Studies Reviewed on Admission:   EKG:  afib with rvr .    ** Please Note: This note has been constructed using a voice recognition system. **

## 2024-03-08 NOTE — ASSESSMENT & PLAN NOTE
Follows with pulmonology  Continue with albuterol inhaler/nebulizer as needed, Pulmicort as needed, Claritin, Singulair   Respiratory protocol  Currently at baseline oxygen requirement 4 L

## 2024-03-08 NOTE — RESPIRATORY THERAPY NOTE
RT Protocol Note  Mattie Joy 63 y.o. female MRN: [id number]  Unit/Bed#: -01 Encounter: [id number]    Assessment    Principal Problem:    Paroxysmal atrial fibrillation with RVR  Active Problems:    Esophageal reflux    Obstructive sleep apnea    Hypercholesterolemia    Type 2 diabetes mellitus with stage 2 chronic kidney disease, with long-term current use of insulin (HCC)    Chronic diastolic CHF (congestive heart failure) ([facility])    Chronic respiratory failure with hypoxia and hypercapnia (HCC)    Morbid obesity ([facility])    COPD, severe ([facility])      Home Pulmonary Medications:         Past Medical History:   Diagnosis Date    Acute blood loss anemia 06/01/2021    Acute diverticulitis 05/02/2021    Acute on chronic diastolic congestive heart failure (HCC) 05/21/2020    Acute on chronic respiratory failure with hypoxia ([facility]) 11/30/2018    Alcohol abuse 05/21/2020    Anemia     Asthma with COPD with exacerbation  11/12/2020    Atrial fibrillation ([facility])     Cervical radiculopathy     CHF (congestive heart failure) ([facility])     Chronic low back pain     Chronic obstructive asthma 02/20/2018    Cigarette nicotine dependence without complication 11/20/2019    Quit 12/10/2019.     Community acquired pneumonia 05/21/2020    COPD exacerbation ([facility]) 09/16/2020    Depression with anxiety 03/09/2014    Diabetes mellitus with hyperglycemia ([facility])     Diverticulitis 06/06/2021    Elevated liver enzymes     GERD (gastroesophageal reflux disease)     Hypersomnolence 10/28/2020    Hypokalemia 12/04/2018    Lower gastrointestinal bleeding 06/01/2021    Paresthesia of upper extremity     Plantar fasciitis     Restless legs syndrome 04/08/2014    Severe persistent asthma with exacerbation 02/20/2018    PFTs February 8, 2018:  FEV1 0.87 L-35% predicted, 27% improvement post-bronchodilator.  DLCO-82% predicted,  The patient has been having worsening of her symptoms now for few months, especially  from January 2020, also she had multiple  exacerbations last year and most recently required a steroid burst and August  Reviewing her CT scan  New PFTs with severe obstruction reviewed  Hypersensitivity p    Sexual dysfunction     Sleep apnea, obstructive     Tenosynovitis of finger     Tinea corporis     Tobacco use 2018    Currently smoking 3-4 cigarettes daily    Trigger middle finger of left hand 2017    Trigger ring finger of left hand 2017    Weakness of upper extremity      Social History     Socioeconomic History    Marital status: Significant Other     Spouse name: None    Number of children: None    Years of education: None    Highest education level: None   Occupational History    None   Tobacco Use    Smoking status: Former     Current packs/day: 0.00     Average packs/day: 0.3 packs/day for 29.9 years (7.5 ttl pk-yrs)     Types: Cigarettes     Start date:      Quit date: 12/10/2019     Years since quittin.2    Smokeless tobacco: Never   Vaping Use    Vaping status: Never Used   Substance and Sexual Activity    Alcohol use: Not Currently     Alcohol/week: 20.0 standard drinks of alcohol     Types: 20 Cans of beer per week     Comment: about 6 coors light every night, stopped in 2019    Drug use: No    Sexual activity: Not Currently   Other Topics Concern    None   Social History Narrative    None     Social Determinants of Health     Financial Resource Strain: Not on file   Food Insecurity: No Food Insecurity (2024)    Hunger Vital Sign     Worried About Running Out of Food in the Last Year: Never true     Ran Out of Food in the Last Year: Never true   Transportation Needs: No Transportation Needs (2024)    PRAPARE - Transportation     Lack of Transportation (Medical): No     Lack of Transportation (Non-Medical): No   Physical Activity: Not on file   Stress: Not on file   Social Connections: Not on file   Intimate Partner Violence: Not on file   Housing Stability: Low Risk  (2024)    Housing Stability  Vital Sign     Unable to Pay for Housing in the Last Year: No     Number of Places Lived in the Last Year: 1     Unstable Housing in the Last Year: No       Subjective         Objective    Physical Exam:        Vitals:  Blood pressure 130/79, pulse (!) 117, temperature 98.1 °F (36.7 °C), resp. rate 20, SpO2 99%, not currently breastfeeding.          Imaging and other studies: I have personally reviewed pertinent reports.            Plan    Respiratory Plan: Mild Distress pathway

## 2024-03-08 NOTE — ASSESSMENT & PLAN NOTE
Patient with chronic diastolic heart failure, noted to have heart rate in 130s at cardiology office 3/8  At risk of decompensated heart failure given paroxysmal atrial fibrillation with RVR    Continue with telemetry monitoring  Optimize electrolytes as needed  Home regimen Toprol  mg daily and Eliquis 5 mg twice daily  Continue to hold Toprol-XL, start Lopressor 50 twice daily  Continue with Eliquis for anticoagulation  Appreciate cardiology assistance

## 2024-03-08 NOTE — PROGRESS NOTES
Cardiology Outpatient Follow-Up Note - Mattie Joy 63 y.o. female MRN: [id number]      Assessment/Plan:    1. Chronic diastolic heart failure (HCC)  Multiple recent hospitalizations for decompensation  Presents today for short-term D/C follow-up, she is still euvolemic, Weight in office 269 lbs from 266 lbs at discharge  Diuretic regimen is bumex 6 mg BID and spironlactone 100 mg daily -- continue  She is now on SGLT2i, started Jardiance in hospital, but not on formulary. Switch to Farxiga 10 mg daily    Has Mitrionicsl HFMS, but as we are sending her to ER we will set up after discharge    - Ambulatory referral to Cardiology  - dapagliflozin (Farxiga) 10 MG tablet; Take 1 tablet (10 mg total) by mouth daily  Dispense: 30 tablet; Refill: 6    2. Paroxysmal atrial fibrillation (HCC)  She is in rapid  bpm in the office today. She feels dyspneic and nervous. She would be alone this weekend. She is at increased risk of decompensated CHF due to rapid AF. She is on metoprolol  mg daily. This needs adjustment but given her increased risk we will send her to [facility] ER at least for observation admission to titrate her rate control medications.       Patient being sent to ER for AF with RVR      We will see Mattie Joy back TBD    Subjective:     HPI: Mattie oJy is a 63 y.o. year old female who presented to me initially on 9/9/21 for further evaluation of CHF with preserved LVEF, pulmonary hypertension by echo estimation, and atrial fibrillation on flecainide therapy (since committed to permanent AF). She also has chronic hypoxic respiratory failure due to asthma COPD overlap syndrome and is followed by Pulmonology.      I referred her for RHC which was performed on 9/30/21. The study showed severely elevated right (RAP 20mmHg) and left (PCWP 30mmHg) sided filling pressure. There was severe post-capillary pulmonary hypertension (PAP 80/38/52) with a PVR of 2.7WU. Cardiac output was normal. The study was suggestive of  "primarily group II pulmonary hypertension due to left sided heart disease and elevated filling pressure.     She has started a special therapy for eosinophilic asthma, on injectable biologic, but reports minimal to no improvement.      Lately she has been frequently admitted to [facility] for severe decompensations of HFpEF. She was admitted for 18 days beginning 1/18/24 at which time she was diuresed with a bumex gtt from 291 lbs to 261 lbs. She was discharged on 2/9/24 but unfortunately readmitted rather soon on 2/26/24 with decompensated CHF and a weight of 279 lbs. She was diuresed with bumex gtt and discharged 3/6/24 at 266 lbs. The cause of her volume overload appears to be dietary indiscretion. We frequently found her with abundant wrappers of salty snacks in her room despite repeated re-education from several providers about sodium restriction. On discharge 3/6/24 she was sent on a diuretic regimen of  bumex 6 mg BID and spironolactone 100 mg daily. Jardiance 10 mg daily was also started in the hospital.     Her weight today is 269 lbs.     Cardiac Testing:          EKGs, personally reviewed:  EKG in the office 3/29/2023 shows normal sinus rhythm, 87 bpm, normal axis, normal intervals.  Normal study.    EKG 3/8/24 - AF with RVR, 131 bpm. Abnormal study.    Relevant Labs & Results:  [facility] 2/6/2023 reviewed--K4.2, creatinine 0.65--on torsemide 60 mg twice daily and potassium 20 mEq twice daily    BMP 3/6/23 - K 4.4, Cr 0.70  BNP 2/26/24 - 177  CBC 3/5/24 - Hgb 10.5 g/dL    ROS:  Review of Systems:  Review of Systems    Objective:     Vitals:   Vitals:    03/08/24 1326   Weight: 122 kg (269 lb)   Height: 5' 3\" (1.6 m)    Body surface area is 2.19 meters squared.  Wt Readings from Last 3 Encounters:   03/08/24 122 kg (269 lb)   03/06/24 121 kg (266 lb)   02/26/24 127 kg (279 lb)       Physical Exam:  General: Mattie Joy is a chronically ill appearing and morbidly obese female in a wheelchair and on NCO2  HEENT: moist " mucous membranes, EOMI  Neck:  unable to assess JVD, supple, trachea midline  Cardiovascular: irregularly irregular, rapid rate, no murmur  Pulmonary: normal respiratory effort, CTAB  Abdomen: obese  Extremities: trace lower extremity edema. Warm and well perfused extremities.  Neuro: no focal motor deficits, AAOx3 (person, place, time)  Psych: Normal mood and affect, cooperative        Medications (at the START of this encounter):  Outpatient Medications Prior to Visit   Medication Sig Dispense Refill    albuterol (2.5 mg/3 mL) 0.083 % nebulizer solution Take 3 mL (2.5 mg total) by nebulization every 6 (six) hours as needed for wheezing or shortness of breath 1080 mL 3    albuterol (PROVENTIL HFA,VENTOLIN HFA) 90 mcg/act inhaler Inhale 2 puffs every 4 (four) hours as needed for wheezing 8.5 g 5    apixaban (Eliquis) 5 mg Take 1 tablet (5 mg total) by mouth 2 (two) times a day 60 tablet 2    atorvastatin (LIPITOR) 40 mg tablet Take 1 tablet (40 mg total) by mouth daily 90 tablet 3    Blood Glucose Monitoring Suppl (Contour Next One) AILYN USE AS DIRECTED 3 TIMES A DAY (Patient taking differently: USE AS DIRECTED 3 TIMES A DAY    Once a day) 1 each 0    budesonide (PULMICORT) 0.5 mg/2 mL nebulizer solution TAKE 2 ML (0.5 MG TOTAL) BY NEBULIZATION TWICE A DAY RINSE MOUTH AFTER USE 60 mL 3    bumetanide (BUMEX) 2 mg tablet Take 3 tablets (6 mg total) by mouth 2 (two) times a day 180 tablet 0    Contour Next Test test strip USE TO TEST BLOOD SUGAR 3 TIMES A DAY (Patient taking differently: USE TO TEST BLOOD SUGAR 3 TIMES A DAY    Once a day) 100 strip 3    [employer] Lancets Thin 26G MISC USE 3 (THREE) TIMES A DAY (Patient taking differently: Once a day) 100 each 5    DULoxetine (CYMBALTA) 60 mg delayed release capsule Take 60 mg by mouth daily      Empagliflozin (JARDIANCE) 10 MG TABS tablet Take 1 tablet (10 mg total) by mouth daily 60 tablet 0    EPINEPHrine (EPIPEN) 0.3 mg/0.3 mL SOAJ Inject 0.3 mL (0.3 mg total) into a  muscle once for 1 dose 0.6 mL 0    glycopyrrolate-formoterol (BEVESPI AEROSPHERE) 9-4.8 MCG/ACT inhaler Inhale 2 puffs 2 (two) times a day 10.7 g 5    Insulin Glargine Solostar (Lantus SoloStar) 100 UNIT/ML SOPN Inject 0.45 mL (45 Units total) as directed daily at bedtime Inject 45 units daily at bedtime 15 mL 0    insulin glulisine (Apidra SoloStar) 100 units/mL injection pen Inject 15 Units under the skin 3 (three) times a day with meals 15 units 3 times a day with meals 15 mL 0    Insulin Pen Needle (BD Pen Needle Love 2nd Gen) 32G X 4 MM MISC Use daily at bedtime Use 4 new needles daily . 400 each 3    loratadine (CLARITIN) 10 mg tablet Take 1 tablet (10 mg total) by mouth daily as needed for allergies 30 tablet 0    LORazepam (ATIVAN) 0.5 mg tablet TAKE 2 TABLETS BY MOUTH AT BEDTIME AND 1 EVERY 6 HOURS AS NEEDED FOR ANXIETY 90 tablet 0    metoprolol succinate (TOPROL-XL) 100 mg 24 hr tablet Take 1 tablet (100 mg total) by mouth daily 90 tablet 3    montelukast (SINGULAIR) 10 mg tablet TAKE 1 TABLET BY MOUTH EVERYDAY AT BEDTIME 90 tablet 3    naloxone (NARCAN) 4 mg/0.1 mL nasal spray Administer 1 spray into a nostril. If breathing does not return to normal or if breathing difficulty resumes after 2-3 minutes, give another dose in the other nostril using a new spray. 1 each 1    nystatin (MYCOSTATIN) powder Apply topically 2 (two) times a day 30 g 0    omeprazole (PriLOSEC) 40 MG capsule Take 1 capsule (40 mg total) by mouth daily 90 capsule 3    potassium chloride (Klor-Con M20) 20 mEq tablet Take 2 tablets (40 mEq total) by mouth daily 180 tablet 3    spironolactone (ALDACTONE) 50 mg tablet Take 2 tablets (100 mg total) by mouth daily 30 tablet 0    traZODone (DESYREL) 150 mg tablet TAKE 1 TABLET BY MOUTH EVERYDAY AT BEDTIME 90 tablet 0    fluticasone (FLONASE) 50 mcg/act nasal spray 1 spray into each nostril 2 (two) times a day (Patient not taking: Reported on 3/8/2024) 1 Bottle 3    levalbuterol (XOPENEX)  "1.25 mg/0.5 mL nebulizer solution Take 0.5 mL (1.25 mg total) by nebulization 2 (two) times a day (Patient not taking: Reported on 2/26/2024) 60 vial 0     No facility-administered medications prior to visit.                 Time Spent:  Total time (face-to-face and non-face-to-face) spent on today's visit was 25 minutes. This includes preparation for the visits (i.e. reviewing test results), performance of a medically appropriate history and examination, and orders for medications, tests or other procedures. This time is exclusive of procedures performed and time spent teaching.      This note was completed in part utilizing Dragon [employer] One voice recognition software. Grammatical errors, random word insertion, spelling mistakes, occasional wrong word or \"sound-alike\" substitutions and incomplete sentences may be an occasional consequence of the system secondary to software limitations, ambient noise and hardware issues. At the time of dictation, efforts were made to edit, clarify and /or correct errors.  Please read the chart carefully and recognize, using context, where substitutions have occurred.  If you have any questions or concerns about the context, text or information contained within the body of this dictation, please contact myself, the provider, for further clarification.    "

## 2024-03-09 LAB
ANION GAP SERPL CALCULATED.3IONS-SCNC: 11 MMOL/L
ATRIAL RATE: 388 BPM
BUN SERPL-MCNC: 25 MG/DL (ref 5–25)
CALCIUM SERPL-MCNC: 8.7 MG/DL (ref 8.4–10.2)
CHLORIDE SERPL-SCNC: 91 MMOL/L (ref 96–108)
CO2 SERPL-SCNC: 31 MMOL/L (ref 21–32)
CREAT SERPL-MCNC: 0.74 MG/DL (ref 0.6–1.3)
ERYTHROCYTE [DISTWIDTH] IN BLOOD BY AUTOMATED COUNT: 19.6 % (ref 11.6–15.1)
GFR SERPL CREATININE-BSD FRML MDRD: 85 ML/MIN/1.73SQ M
GLUCOSE SERPL-MCNC: 132 MG/DL (ref 65–140)
GLUCOSE SERPL-MCNC: 160 MG/DL (ref 65–140)
GLUCOSE SERPL-MCNC: 174 MG/DL (ref 65–140)
GLUCOSE SERPL-MCNC: 208 MG/DL (ref 65–140)
GLUCOSE SERPL-MCNC: 220 MG/DL (ref 65–140)
GLUCOSE SERPL-MCNC: 292 MG/DL (ref 65–140)
HCT VFR BLD AUTO: 40.5 % (ref 34.8–46.1)
HGB BLD-MCNC: 11.5 G/DL (ref 11.5–15.4)
MAGNESIUM SERPL-MCNC: 2.6 MG/DL (ref 1.9–2.7)
MCH RBC QN AUTO: 18.8 PG (ref 26.8–34.3)
MCHC RBC AUTO-ENTMCNC: 28.4 G/DL (ref 31.4–37.4)
MCV RBC AUTO: 66 FL (ref 82–98)
PLATELET # BLD AUTO: 362 THOUSANDS/UL (ref 149–390)
POTASSIUM SERPL-SCNC: 4.1 MMOL/L (ref 3.5–5.3)
QRS AXIS: 80 DEGREES
QRSD INTERVAL: 74 MS
QT INTERVAL: 300 MS
QTC INTERVAL: 448 MS
RBC # BLD AUTO: 6.13 MILLION/UL (ref 3.81–5.12)
SODIUM SERPL-SCNC: 133 MMOL/L (ref 135–147)
T WAVE AXIS: 79 DEGREES
VENTRICULAR RATE: 134 BPM
WBC # BLD AUTO: 14.53 THOUSAND/UL (ref 4.31–10.16)

## 2024-03-09 PROCEDURE — 80048 BASIC METABOLIC PNL TOTAL CA: CPT | Performed by: INTERNAL MEDICINE

## 2024-03-09 PROCEDURE — 85027 COMPLETE CBC AUTOMATED: CPT | Performed by: INTERNAL MEDICINE

## 2024-03-09 PROCEDURE — 94660 CPAP INITIATION&MGMT: CPT

## 2024-03-09 PROCEDURE — 99232 SBSQ HOSP IP/OBS MODERATE 35: CPT | Performed by: PHYSICIAN ASSISTANT

## 2024-03-09 PROCEDURE — 82948 REAGENT STRIP/BLOOD GLUCOSE: CPT

## 2024-03-09 PROCEDURE — 94760 N-INVAS EAR/PLS OXIMETRY 1: CPT

## 2024-03-09 PROCEDURE — 94640 AIRWAY INHALATION TREATMENT: CPT

## 2024-03-09 PROCEDURE — 83735 ASSAY OF MAGNESIUM: CPT | Performed by: INTERNAL MEDICINE

## 2024-03-09 PROCEDURE — 99223 1ST HOSP IP/OBS HIGH 75: CPT | Performed by: INTERNAL MEDICINE

## 2024-03-09 PROCEDURE — 93010 ELECTROCARDIOGRAM REPORT: CPT | Performed by: INTERNAL MEDICINE

## 2024-03-09 RX ORDER — METOPROLOL TARTRATE 50 MG/1
50 TABLET, FILM COATED ORAL EVERY 12 HOURS SCHEDULED
Status: DISCONTINUED | OUTPATIENT
Start: 2024-03-09 | End: 2024-03-10 | Stop reason: HOSPADM

## 2024-03-09 RX ORDER — DILTIAZEM HYDROCHLORIDE 60 MG/1
30 TABLET, FILM COATED ORAL EVERY 6 HOURS SCHEDULED
Status: DISCONTINUED | OUTPATIENT
Start: 2024-03-09 | End: 2024-03-10

## 2024-03-09 RX ORDER — METOPROLOL TARTRATE 50 MG/1
50 TABLET, FILM COATED ORAL DAILY
Status: DISCONTINUED | OUTPATIENT
Start: 2024-03-09 | End: 2024-03-09

## 2024-03-09 RX ADMIN — DULOXETINE HYDROCHLORIDE 60 MG: 30 CAPSULE, DELAYED RELEASE ORAL at 08:47

## 2024-03-09 RX ADMIN — DILTIAZEM HYDROCHLORIDE 30 MG: 60 TABLET ORAL at 17:24

## 2024-03-09 RX ADMIN — INSULIN LISPRO 15 UNITS: 100 INJECTION, SOLUTION INTRAVENOUS; SUBCUTANEOUS at 08:48

## 2024-03-09 RX ADMIN — INSULIN LISPRO 1 UNITS: 100 INJECTION, SOLUTION INTRAVENOUS; SUBCUTANEOUS at 08:49

## 2024-03-09 RX ADMIN — EMPAGLIFLOZIN 10 MG: 10 TABLET, FILM COATED ORAL at 08:48

## 2024-03-09 RX ADMIN — INSULIN LISPRO 1 UNITS: 100 INJECTION, SOLUTION INTRAVENOUS; SUBCUTANEOUS at 11:56

## 2024-03-09 RX ADMIN — BUDESONIDE 0.5 MG: 0.5 INHALANT RESPIRATORY (INHALATION) at 19:53

## 2024-03-09 RX ADMIN — SPIRONOLACTONE 100 MG: 25 TABLET ORAL at 08:46

## 2024-03-09 RX ADMIN — INSULIN LISPRO 2 UNITS: 100 INJECTION, SOLUTION INTRAVENOUS; SUBCUTANEOUS at 21:58

## 2024-03-09 RX ADMIN — INSULIN LISPRO 15 UNITS: 100 INJECTION, SOLUTION INTRAVENOUS; SUBCUTANEOUS at 11:57

## 2024-03-09 RX ADMIN — DILTIAZEM HYDROCHLORIDE 30 MG: 60 TABLET ORAL at 13:46

## 2024-03-09 RX ADMIN — APIXABAN 5 MG: 5 TABLET, FILM COATED ORAL at 17:22

## 2024-03-09 RX ADMIN — POTASSIUM CHLORIDE 40 MEQ: 1500 TABLET, EXTENDED RELEASE ORAL at 08:46

## 2024-03-09 RX ADMIN — INSULIN GLARGINE 45 UNITS: 100 INJECTION, SOLUTION SUBCUTANEOUS at 21:58

## 2024-03-09 RX ADMIN — TRAZODONE HYDROCHLORIDE 150 MG: 100 TABLET ORAL at 21:58

## 2024-03-09 RX ADMIN — BUDESONIDE 0.5 MG: 0.5 INHALANT RESPIRATORY (INHALATION) at 07:10

## 2024-03-09 RX ADMIN — MONTELUKAST 10 MG: 10 TABLET, FILM COATED ORAL at 21:58

## 2024-03-09 RX ADMIN — PANTOPRAZOLE SODIUM 40 MG: 40 TABLET, DELAYED RELEASE ORAL at 06:26

## 2024-03-09 RX ADMIN — ALBUTEROL SULFATE 2.5 MG: 2.5 SOLUTION RESPIRATORY (INHALATION) at 07:10

## 2024-03-09 RX ADMIN — INSULIN LISPRO 1 UNITS: 100 INJECTION, SOLUTION INTRAVENOUS; SUBCUTANEOUS at 17:26

## 2024-03-09 RX ADMIN — BUMETANIDE 6 MG: 1 TABLET ORAL at 08:47

## 2024-03-09 RX ADMIN — METOPROLOL TARTRATE 50 MG: 50 TABLET ORAL at 08:45

## 2024-03-09 RX ADMIN — ATORVASTATIN CALCIUM 40 MG: 40 TABLET, FILM COATED ORAL at 17:23

## 2024-03-09 RX ADMIN — BUMETANIDE 6 MG: 1 TABLET ORAL at 19:48

## 2024-03-09 RX ADMIN — APIXABAN 5 MG: 5 TABLET, FILM COATED ORAL at 08:47

## 2024-03-09 RX ADMIN — INSULIN LISPRO 15 UNITS: 100 INJECTION, SOLUTION INTRAVENOUS; SUBCUTANEOUS at 17:27

## 2024-03-09 RX ADMIN — METOPROLOL TARTRATE 50 MG: 50 TABLET ORAL at 21:58

## 2024-03-09 NOTE — PROGRESS NOTES
[facility]  Progress Note  Name: Mattie Joy I  MRN: [id number]  Unit/Bed#: -01 I Date of Admission: 3/8/2024   Date of Service: 3/9/2024 I Hospital Day: 0    Assessment/Plan   * Paroxysmal atrial fibrillation with RVR  Assessment & Plan  Patient with chronic diastolic heart failure, noted to have heart rate in 130s at cardiology office 3/8  At risk of decompensated heart failure given paroxysmal atrial fibrillation with RVR    Continue with telemetry monitoring  Optimize electrolytes as needed  Home regimen Toprol  mg daily and Eliquis 5 mg twice daily  Continue to hold Toprol-XL, start Lopressor 50 twice daily  Continue with Eliquis for anticoagulation  Appreciate cardiology assistance    COPD, severe (HCC)  Assessment & Plan  Follows with pulmonology  Continue with albuterol inhaler/nebulizer as needed, Pulmicort as needed, Claritin, Singulair   Respiratory protocol  Currently at baseline oxygen requirement 4 L    Morbid obesity (HCC)  Assessment & Plan  Lifestyle modifications advised    Chronic respiratory failure with hypoxia and hypercapnia (HCC)  Assessment & Plan  Chronically on 4 L of oxygen  Uses BiPAP at nighttime  Attributed to underlying severe COPD, and chronic diastolic heart failure  Continue with respiratory protocol    Chronic diastolic CHF (congestive heart failure) (HCC)  Assessment & Plan  Wt Readings from Last 3 Encounters:   03/09/24 123 kg (270 lb 1 oz)   03/08/24 122 kg (269 lb)   03/06/24 121 kg (266 lb)     Recently discharged on 3/6, due to acute on chronic diastolic heart failure  Last echo with EF of 65%, grade 2 diastolic dysfunction, right ventricular systolic pressure of 61  Currently appears euvolemic  Continue with Bumex 6 mg twice daily  Continue with Aldactone 100 mg daily  Continue with Farxiga 10 mg daily  Salt restriction, fluid restriction, strict I's and O's and daily weights  Cardiology following, appreciate  recommendations    Type 2 diabetes mellitus with stage 2 chronic kidney disease, with long-term current use of insulin (HCC)  Assessment & Plan  Lab Results   Component Value Date    HGBA1C 8.5 (H) 2024       Recent Labs     24  1154 24  1700 24  2123 24  0757   POCGLU 301* 266* 260* 160*         Blood Sugar Average: Last 72 hrs:  (P) [id number]  Continue with home regimen of Lantus 45 units at bedtime, insulin lispro 15 units 3 times daily AC  Additional coverage with insulin on sliding scale  Carb consistent diet    Hypercholesterolemia  Assessment & Plan  Continue with atorvastatin 40 mg daily at bedtime    Obstructive sleep apnea  Assessment & Plan  Continue with BiPAP nightly    Esophageal reflux  Assessment & Plan  Continue with pantoprazole 40 mg daily           VTE Pharmacologic Prophylaxis: VTE Score: 4 Moderate Risk (Score 3-4) - Pharmacological DVT Prophylaxis Ordered: apixaban (Eliquis).    Patient Centered Rounds: I performed bedside rounds with nursing staff today.  Discussions with Specialists or Other Care Team Provider: nubia, rn    Education and Discussions with Family / Patient: Patient declined call to .     Time Spent for Care: 45 minutes. More than 50% of total time spent on counseling and coordination of care as described above.    Current Length of Stay: 0 day(s)  Current Patient Status: Observation   Certification Statement: The patient, admitted on an observation basis, will now require > 2 midnight hospital stay due to a fib with RVR and hypotension  Discharge Plan: Anticipate discharge tomorrow to home.    Code Status: Level 1 - Full Code    Subjective:   Patient was hypotensive this morning, asymptomatic.  Heart rates improving presently.  Denies any shortness of breath    Objective:     Vitals:   Temp (24hrs), Av.8 °F (36.6 °C), Min:97.7 °F (36.5 °C), Max:98.1 °F (36.7 °C)    Temp:  [97.7 °F (36.5 °C)-98.1 °F (36.7 °C)] 97.7 °F  (36.5 °C)  HR:  [] 114  Resp:  [20-22] 20  BP: ()/() 118/72  SpO2:  [92 %-99 %] 97 %  Body mass index is 47.84 kg/m².     Input and Output Summary (last 24 hours):     Intake/Output Summary (Last 24 hours) at 3/9/2024 0914  Last data filed at 3/9/2024 0852  Gross per 24 hour   Intake 1228 ml   Output 3600 ml   Net -2372 ml       Physical Exam:   Physical Exam  Vitals and nursing note reviewed.   Constitutional:       General: She is not in acute distress.     Appearance: Normal appearance. She is obese.      Interventions: Nasal cannula in place.   HENT:      Head: Normocephalic.      Mouth/Throat:      Mouth: Mucous membranes are moist.   Eyes:      General: No scleral icterus.     Pupils: Pupils are equal, round, and reactive to light.   Cardiovascular:      Rate and Rhythm: Normal rate. Rhythm irregularly irregular.      Heart sounds: No murmur heard.  Pulmonary:      Effort: Pulmonary effort is normal. No respiratory distress.      Breath sounds: Normal breath sounds. No wheezing, rhonchi or rales.   Abdominal:      General: Bowel sounds are normal. There is no distension.      Palpations: Abdomen is soft.      Tenderness: There is no abdominal tenderness.   Musculoskeletal:         General: No swelling.      Right lower leg: No edema.      Left lower leg: No edema.   Skin:     Capillary Refill: Capillary refill takes less than 2 seconds.   Neurological:      General: No focal deficit present.      Mental Status: She is alert and oriented to person, place, and time. Mental status is at baseline.          Additional Data:     Labs:  Results from last 7 days   Lab Units 03/09/24  0507 03/08/24  1448   WBC Thousand/uL 14.53* 16.55*   HEMOGLOBIN g/dL 11.5 11.0*   HEMATOCRIT % 40.5 39.2   PLATELETS Thousands/uL 362 362   NEUTROS PCT %  --  76*   LYMPHS PCT %  --  16   MONOS PCT %  --  7   EOS PCT %  --  0     Results from last 7 days   Lab Units 03/09/24  0507 03/08/24  1448   SODIUM mmol/L 133*  133*   POTASSIUM mmol/L 4.1 4.7   CHLORIDE mmol/L 91* 90*   CO2 mmol/L 31 33*   BUN mg/dL 25 26*   CREATININE mg/dL 0.74 0.74   ANION GAP mmol/L 11 10   CALCIUM mg/dL 8.7 8.7   ALBUMIN g/dL  --  4.2   TOTAL BILIRUBIN mg/dL  --  0.39   ALK PHOS U/L  --  137*   ALT U/L  --  13   AST U/L  --  22   GLUCOSE RANDOM mg/dL 132 159*         Results from last 7 days   Lab Units 03/09/24  0757 03/08/24  2123 03/08/24  1700 03/06/24  1154 03/06/24  0755 03/05/24  2101 03/05/24  1627 03/05/24  1137 03/05/24  0636 03/04/24  2107 03/04/24  1725 03/04/24  1124   POC GLUCOSE mg/dl 160* 260* 266* 301* 191* 159* 226* 285* 170* 177* 175* 176*               Lines/Drains:  Invasive Devices       Peripheral Intravenous Line  Duration             Peripheral IV 03/05/24 Distal;Right;Ventral (anterior) Forearm 4 days    Peripheral IV 03/08/24 Left Antecubital <1 day                      Telemetry:  Telemetry Orders (From admission, onward)               24 Hour Telemetry Monitoring  Continuous x 24 Hours (Telem)        Question:  Reason for 24 Hour Telemetry  Answer:  Arrhythmias requiring acute medical intervention / PPM or ICD malfunction                     Telemetry Reviewed: Atrial fibrillation. HR averaging 95  Indication for Continued Telemetry Use: Arrthymias requiring medical therapy             Imaging: No pertinent imaging reviewed.    Recent Cultures (last 7 days):   Results from last 7 days   Lab Units 03/08/24  1828   BLOOD CULTURE  Received in Microbiology Lab. Culture in Progress.  Received in Microbiology Lab. Culture in Progress.       Last 24 Hours Medication List:   Current Facility-Administered Medications   Medication Dose Route Frequency Provider Last Rate    acetaminophen  650 mg Oral Q6H PRN Hawa Marsh MD      albuterol  2 puff Inhalation Q4H PRN Hawa Marsh MD      albuterol  2.5 mg Nebulization Q6H PRN Hawa Marsh MD      apixaban  5 mg Oral BID Hawa Marsh MD      atorvastatin  40 mg Oral Daily With Dinner Hawa  MD Maximo      budesonide  0.5 mg Nebulization Q12H Hawa Marsh MD      bumetanide  6 mg Oral BID Hawa Marsh MD      DULoxetine  60 mg Oral Daily Hawa Marsh MD      Empagliflozin  10 mg Oral Daily Hawa Marsh MD      insulin glargine  45 Units Subcutaneous HS Hawa Marsh MD      insulin lispro  1-5 Units Subcutaneous TID AC Hawa Marsh MD      insulin lispro  1-5 Units Subcutaneous HS Hawa Marsh MD      insulin lispro  15 Units Subcutaneous TID With Meals Hawa Marsh MD      loratadine  10 mg Oral Daily PRN Hawa Marsh MD      LORazepam  0.5 mg Oral HS PRN Hawa Marsh MD      metoprolol tartrate  50 mg Oral Daily Hawa Marsh MD      montelukast  10 mg Oral HS Hawa Marsh MD      ondansetron  4 mg Intravenous Q6H PRN Hawa Marsh MD      pantoprazole  40 mg Oral Daily Before Breakfast Hawa Marsh MD      potassium chloride  40 mEq Oral Daily Hawa Marsh MD      spironolactone  100 mg Oral Daily Hawa Marsh MD      traZODone  150 mg Oral HS Hawa Marsh MD          Today, Patient Was Seen By: COURTNEY DuranC    **Please Note: This note may have been constructed using a voice recognition system.**

## 2024-03-09 NOTE — CONSULTS
Cardiology   Mattie Joy 63 y.o. female MRN: [id number]  -01 Encounter: [id number]        Reason for Consult / Principal Problem: Atrial fibrillation with RVR    Physician Requesting Consult: Hawa Marsh MD    PCP: Laith Olivares MD    Outpatient cardiologist: Jovanni Pacheco MD        Assessment/Plan:    >> Atrial fibrillation with RVR  >> Heart failure with preserved ejection fraction  >> Morbid obesity: BMI of 48 kg/m²  >> Eosinophilic asthma  >> Type 2 diabetes on insulin  >> Dyslipidemia  >> Sleep apnea    Plan:    -Change metoprolol to 50 mg of metoprolol succinate twice daily  -Add Cardizem 30 mg every 6, will change to Cardizem CD if she tolerates this tomorrow  -Continue Bumex 6 mg twice daily, patient received 1 dose of 4 mg of IV Bumex.  -Monitor on telemetry  -Keep K greater than 4 mag greater than 2  -Daily standing weight      CC: Atrial fibrillation with RVR    [facility]  63 y.o. female presented to her cardiologist office yesterday with atrial fibrillation with RVR.  She has numerous prior admissions to the hospital for heart failure with preserved ejection fraction, thought to be due to dietary indiscretions and eating excessive salty foods.    Currently she denies any chest pain or excessive dyspnea on exertion.  Her physical activity is extremely limited.  Reportedly her baseline weight is 261 pounds.  Her weight today is 270 pounds.      The patient denies chest pain, shortness of breath, palpitations, orthopnea, PND, pedal edema, syncope, presyncope, diaphoresis, nausea/vomiting       The ASCVD Risk score (Ceresco DK, et al., 2019) failed to calculate for the following reasons:    The valid total cholesterol range is 130 to 320 mg/dL            Physical exam  Objective   Vitals: Blood pressure 131/85, pulse 105, temperature 97.5 °F (36.4 °C), resp. rate 18, weight 123 kg (270 lb 1 oz), SpO2 96%, not currently breastfeeding.  Orthostatic Blood Pressures      Flowsheet Row Most Recent Value    Blood Pressure 131/85 filed at 2024 1122   Patient Position - Orthostatic VS Sitting filed at 2024 1600          General:  AO x3, no acute distress  Cardiac:  S1-S2 normal JVP: normal  Lungs:  Clear to auscultation bilaterally, no wheezing or crackles.  Abdomen:  Soft, nontender, nondistended.  Extremities:  Warm, well perfused, pulses palpable, no ulcers or rashes. Edema:absent  Neuro: Grossly nonfocal        ======================================================  TREADMILL STRESS  No results found for this or any previous visit.     ----------------------------------------------------------------------------------------------  NUCLEAR STRESS TEST: No results found for this or any previous visit.    No results found for this or any previous visit.      --------------------------------------------------------------------------------  CATH:  No results found for this or any previous visit.    --------------------------------------------------------------------------------  ECHO:   Results for orders placed during the hospital encounter of 20    Echo complete with contrast if indicated    Narrative  [address] [place] [address]    Transthoracic Echocardiogram  2D, M-mode, Doppler, and Color Doppler    Study date:  2020    Patient: MAUREEN SCHWAB  MR number: [id number]  Account number: [id number]  : 1960  Age: 59 years  Gender: Female  Status: Inpatient  Location: [facility]  Height: 63 in  Weight: 277.4 lb  BP: 128/ 62 mmHg    Indications: Dyspnea, Shortness of Breath, Wheezing    Diagnoses: R06.00 - Dyspnea, unspecified, R06.02 - Shortness of breath, R06.2 - Wheezing    Sonographer:  Devon Combs RDCS  Primary Physician:  Laith Olivares MD  Referring Physician:  Itzel Antonio DO  Group:  [facility]  Interpreting Physician:  Lars Richardson MD    SUMMARY    LEFT VENTRICLE:  Systolic function was  normal by visual assessment. Ejection fraction was estimated to be 55 %.  There were no regional wall motion abnormalities.  Features were consistent with a pseudonormal left ventricular filling pattern, with concomitant abnormal relaxation and increased filling pressure (grade 2 diastolic dysfunction).    RIGHT VENTRICLE:  Systolic pressure was moderately to markedly increased. Estimated peak pressure was 55 mmHg.    MITRAL VALVE:  There was mild regurgitation.    AORTIC VALVE:  There was mild regurgitation.    TRICUSPID VALVE:  There was moderate regurgitation.    IVC, HEPATIC VEINS:  The inferior vena cava was mildly dilated.    HISTORY: PRIOR HISTORY: Atrial Fibrillation, Congestive Heart Failure, Diabetes    PROCEDURE: The study was performed in the [facility]. This was a routine study. The transthoracic approach was used. The study included complete 2D imaging, M-mode, complete spectral Doppler, and color Doppler. The heart rate was  75 bpm, at the start of the study. Images were obtained from the parasternal, apical, subcostal, and suprasternal notch acoustic windows. Echocardiographic views were limited due to decreased penetration and lung interference. Image  quality was adequate.    LEFT VENTRICLE: Size was normal. Systolic function was normal by visual assessment. Ejection fraction was estimated to be 55 %. There were no regional wall motion abnormalities. Wall thickness was normal. DOPPLER: The ratio of early  ventricular filling to atrial contraction velocities was within the normal range. Features were consistent with a pseudonormal left ventricular filling pattern, with concomitant abnormal relaxation and increased filling pressure (grade 2  diastolic dysfunction).    RIGHT VENTRICLE: The size was normal. Systolic function was normal. DOPPLER: Systolic pressure was moderately to markedly increased. Estimated peak pressure was 55 mmHg.    LEFT ATRIUM: Size was normal.    RIGHT ATRIUM: Size was  normal.    MITRAL VALVE: Valve structure was normal. There was mild calcification. There was normal leaflet separation. DOPPLER: The transmitral velocity was within the normal range. There was no evidence for stenosis. There was mild regurgitation.    AORTIC VALVE: The valve was trileaflet. Leaflets exhibited normal thickness and normal cuspal separation. DOPPLER: Transaortic velocity was within the normal range. There was no evidence for stenosis. There was mild regurgitation.    TRICUSPID VALVE: The valve structure was normal. There was normal leaflet separation. DOPPLER: The transtricuspid velocity was within the normal range. There was no evidence for stenosis. There was moderate regurgitation.    PULMONIC VALVE: Leaflets exhibited normal thickness, no calcification, and normal cuspal separation. DOPPLER: The transpulmonic velocity was within the normal range. There was no regurgitation.    PERICARDIUM: There was no pericardial effusion.    AORTA: The root exhibited normal size.    SYSTEMIC VEINS: IVC: The inferior vena cava was mildly dilated.    SYSTEM MEASUREMENT TABLES    2D  %FS: 31.14 %  Ao Diam: 2.84 cm  Ao asc: 3.42 cm  EDV(Teich): 118.66 ml  EF(Teich): 58.64 %  ESV(Teich): 49.08 ml  IVC: 24.43 mm  IVSd: 1.02 cm  LA Area: 18.39 cm2  LA Diam: 3.71 cm  LVEDV MOD A4C: 46.44 ml  LVEF MOD A4C: 68.18 %  LVESV MOD A4C: 14.78 ml  LVIDd: 5.01 cm  LVIDs: 3.45 cm  LVLd A4C: 5.93 cm  LVLs A4C: 5.08 cm  LVOT Diam: 1.99 cm  LVPWd: 1 cm  RA Area: 16.43 cm2  RVIDd: 3.71 cm  SV MOD A4C: 31.66 ml  SV(Teich): 69.58 ml    CW  TR Vmax: 3.54 m/s  TR maxP.18 mmHg    MM  TAPSE: 1.75 cm    PW  E' Sept: 0.07 m/s  E/E' Sept: 16.83  MV A Derrick: 0.74 m/s  MV Dec [place]: 6.49 m/s2  MV DecT: 174.84 ms  MV E Derrick: 1.13 m/s  MV E/A Ratio: 1.53  MV PHT: 50.7 ms  MVA By PHT: 4.34 cm2    In[employer]oci[employer] Commission Accredited Echocardiography Laboratory    Prepared and electronically signed by    Lars Richardson MD  Signed 2020  12:31:59    Results for orders placed during the hospital encounter of 18    MITCH    Narrative  [address] [address]    Transesophageal Echocardiogram  2D and Color Doppler    Study date:  05-Dec-2018    Patient: MAUREEN SCHWAB  MR number: [id number]  Account number: [id number]  : 1960  Age: 57 years  Gender: Female  Status: Inpatient  Location: Echo lab  Height: 63 in  Weight: 252 lb  BP: 149/ 77 mmHg    Indications: Atrial flutter.    Diagnoses: I48.1 - Atrial flutter    Sonographer:  Jorge MIKE, [facility]  Primary Physician:  Laith Olivares MD  Referring Physician:  Lars Richardson MD  Group:  [facility]  Interpreting Physician:  Lars Richardson MD    SUMMARY    LEFT VENTRICLE:  Systolic function was normal by visual assessment. Ejection fraction was estimated to be 55 %.  There were no regional wall motion abnormalities.    LEFT ATRIUM:  The atrium was mildly to moderately dilated.    LEFT ATRIAL APPENDAGE:  The appendage was moderately dilated.  The function was moderately reduced (moderately reduced emptying velocity).  No thrombus was identified.    ATRIAL SEPTUM:  No defect or patent foramen ovale was identified.    RIGHT ATRIUM:  The atrium was mildly dilated.    MITRAL VALVE:  There was mild regurgitation.    AORTIC VALVE:  There was moderate regurgitation.    TRICUSPID VALVE:  There was moderate regurgitation.    HISTORY: PRIOR HISTORY: Respiratory failure, Hypoxia, Asthma, Acute CHF, Diabetes, Sleep apnea, Pneumonia.    PROCEDURE: The procedure was performed in the echo lab. This was a routine study. The risks and alternatives of the procedure were explained to the patient and informed consent was obtained. The transesophageal approach was used. The study  included complete 2D imaging and color Doppler. by the attending cardiologist. Image quality was adequate. There were no complications during the procedure.  MEDICATIONS: Anesthesia.    LEFT VENTRICLE: Size was normal. Systolic function was normal by visual assessment. Ejection fraction was estimated to be 55 %. There were no regional wall motion abnormalities. Wall thickness was normal.    RIGHT VENTRICLE: The size was normal. Systolic function was normal.    LEFT ATRIUM: The atrium was mildly to moderately dilated. APPENDAGE: The appendage was moderately dilated. No thrombus was identified. DOPPLER: The function was moderately reduced (moderately reduced emptying velocity).    ATRIAL SEPTUM: No defect or patent foramen ovale was identified.    RIGHT ATRIUM: The atrium was mildly dilated.    MITRAL VALVE: Valve structure was normal. There was normal leaflet separation. There was no echocardiographic evidence of vegetation. DOPPLER: There was mild regurgitation.    AORTIC VALVE: The valve was trileaflet. Leaflets exhibited normal thickness and normal cuspal separation. There was no echocardiographic evidence of vegetation. DOPPLER: There was moderate regurgitation.    TRICUSPID VALVE: The valve structure was normal. There was normal leaflet separation. There was no echocardiographic evidence of vegetation. DOPPLER: There was moderate regurgitation.    PULMONIC VALVE: Leaflets exhibited normal thickness, no calcification, and normal cuspal separation. There was no echocardiographic evidence of vegetation.    PERICARDIUM: There was no pericardial effusion.    AORTA: The root exhibited normal size. There was no atheroma. There was no evidence for dissection. There was no evidence for aneurysm.    In[employer]ocietal Commission Accredited Echocardiography Laboratory    Prepared and electronically signed by    Lars Richardson MD  Signed 05-Dec-2018 15:38:34    --------------------------------------------------------------------------------  HOLTER  No results found for this or any previous  visit.    --------------------------------------------------------------------------------  CAROTIDS  No results found for this or any previous visit.       [unfilled]   ======================================================          Review of Systems  ROS as noted above, otherwise 12 point review of systems was performed and is negative.     Historical Information   Past Medical History:   Diagnosis Date    Acute blood loss anemia 06/01/2021    Acute diverticulitis 05/02/2021    Acute on chronic diastolic congestive heart failure (HCC) 05/21/2020    Acute on chronic respiratory failure with hypoxia ([facility]) 11/30/2018    Alcohol abuse 05/21/2020    Anemia     Asthma with COPD with exacerbation  11/12/2020    Atrial fibrillation ([facility])     Cervical radiculopathy     CHF (congestive heart failure) ([facility])     Chronic low back pain     Chronic obstructive asthma 02/20/2018    Cigarette nicotine dependence without complication 11/20/2019    Quit 12/10/2019.     Community acquired pneumonia 05/21/2020    COPD exacerbation ([facility]) 09/16/2020    Depression with anxiety 03/09/2014    Diabetes mellitus with hyperglycemia ([facility])     Diverticulitis 06/06/2021    Elevated liver enzymes     GERD (gastroesophageal reflux disease)     Hypersomnolence 10/28/2020    Hypokalemia 12/04/2018    Lower gastrointestinal bleeding 06/01/2021    Paresthesia of upper extremity     Plantar fasciitis     Restless legs syndrome 04/08/2014    Severe persistent asthma with exacerbation 02/20/2018    PFTs February 8, 2018:  FEV1 0.87 L-35% predicted, 27% improvement post-bronchodilator.  DLCO-82% predicted,  The patient has been having worsening of her symptoms now for few months, especially  from January 2020, also she had multiple exacerbations last year and most recently required a steroid burst and August  Reviewing her CT scan  New PFTs with severe obstruction reviewed  Hypersensitivity p    Sexual dysfunction     Sleep apnea, obstructive      Tenosynovitis of finger     Tinea corporis     Tobacco use 2018    Currently smoking 3-4 cigarettes daily    Trigger middle finger of left hand 2017    Trigger ring finger of left hand 2017    Weakness of upper extremity      Past Surgical History:   Procedure Laterality Date    ABDOMINAL SURGERY      CARPAL TUNNEL RELEASE Bilateral      SECTION      CHOLECYSTECTOMY      laparoscopic    ESOPHAGOGASTRODUODENOSCOPY N/A 12/3/2018    Procedure: ESOPHAGOGASTRODUODENOSCOPY (EGD) AND COLONOSCOPY;  Surgeon: Ludmila Perry MD;  Location: [facility];  Service: Gastroenterology    GASTRIC BYPASS      for morbid obesity with gilda en y    HERNIA REPAIR      HYSTERECTOMY      ID EXCISION GANGLION WRIST DORSAL/VOLAR PRIMARY Left 2017    Procedure: LONG FINGER GANGLION CYST EXCISION;  Surgeon: Philippe Clark MD;  Location: [facility];  Service: Orthopedics    ID TENDON SHEATH INCISION Left 2017    Procedure: THUMB, LONG, RING, TRIGGER FINGER RELEASE;  Surgeon: Philippe Clark MD;  Location: [facility];  Service: Orthopedics    SHOULDER SURGERY Right      Social History     Substance and Sexual Activity   Alcohol Use Not Currently    Alcohol/week: 20.0 standard drinks of alcohol    Types: 20 Cans of beer per week    Comment: about 6 coors light every night, stopped in 2019     Social History     Substance and Sexual Activity   Drug Use No     Social History     Tobacco Use   Smoking Status Former    Current packs/day: 0.00    Average packs/day: 0.3 packs/day for 29.9 years (7.5 ttl pk-yrs)    Types: Cigarettes    Start date:     Quit date: 12/10/2019    Years since quittin.2   Smokeless Tobacco Never     Family History   Problem Relation Age of Onset    Alzheimer's disease Mother     Atrial fibrillation Mother     Dementia Mother     Heart disease Mother         heart problem    Seizures Mother     Parkinsonism Father     Arthritis Father     Atrial fibrillation Father     No Known  Problems Daughter     Cri-du-chat syndrome Daughter     Colon cancer Maternal Grandmother 80    Diabetes type II Maternal Grandmother     No Known Problems Brother     No Known Problems Son     Substance Abuse Neg Hx         mother,father    Mental illness Neg Hx         mother,father    Colon polyps Neg Hx        Meds/Allergies   Hospital Medications:   Current Facility-Administered Medications   Medication Dose Route Frequency    acetaminophen (TYLENOL) tablet 650 mg  650 mg Oral Q6H PRN    albuterol (PROVENTIL HFA,VENTOLIN HFA) inhaler 2 puff  2 puff Inhalation Q4H PRN    albuterol inhalation solution 2.5 mg  2.5 mg Nebulization Q6H PRN    apixaban (ELIQUIS) tablet 5 mg  5 mg Oral BID    atorvastatin (LIPITOR) tablet 40 mg  40 mg Oral Daily With Dinner    budesonide (PULMICORT) inhalation solution 0.5 mg  0.5 mg Nebulization Q12H    bumetanide (BUMEX) tablet 6 mg  6 mg Oral BID    DULoxetine (CYMBALTA) delayed release capsule 60 mg  60 mg Oral Daily    Empagliflozin (JARDIANCE) tablet 10 mg  10 mg Oral Daily    insulin glargine (LANTUS) subcutaneous injection 45 Units 0.45 mL  45 Units Subcutaneous HS    insulin lispro (HumALOG/ADMELOG) 100 units/mL subcutaneous injection 1-5 Units  1-5 Units Subcutaneous TID AC    insulin lispro (HumALOG/ADMELOG) 100 units/mL subcutaneous injection 1-5 Units  1-5 Units Subcutaneous HS    insulin lispro (HumALOG/ADMELOG) 100 units/mL subcutaneous injection 15 Units  15 Units Subcutaneous TID With Meals    loratadine (CLARITIN) tablet 10 mg  10 mg Oral Daily PRN    LORazepam (ATIVAN) tablet 0.5 mg  0.5 mg Oral HS PRN    metoprolol tartrate (LOPRESSOR) tablet 50 mg  50 mg Oral Q12H LISETTE    montelukast (SINGULAIR) tablet 10 mg  10 mg Oral HS    ondansetron (ZOFRAN) injection 4 mg  4 mg Intravenous Q6H PRN    pantoprazole (PROTONIX) EC tablet 40 mg  40 mg Oral Daily Before Breakfast    potassium chloride (Klor-Con M20) CR tablet 40 mEq  40 mEq Oral Daily    spironolactone (ALDACTONE)  tablet 100 mg  100 mg Oral Daily    traZODone (DESYREL) tablet 150 mg  150 mg Oral HS     Home Medications:   Medications Prior to Admission   Medication    albuterol (2.5 mg/3 mL) 0.083 % nebulizer solution    albuterol (PROVENTIL HFA,VENTOLIN HFA) 90 mcg/act inhaler    apixaban (Eliquis) 5 mg    atorvastatin (LIPITOR) 40 mg tablet    Blood Glucose Monitoring Suppl (Contour Next One) AILYN    budesonide (PULMICORT) 0.5 mg/2 mL nebulizer solution    bumetanide (BUMEX) 2 mg tablet    Contour Next Test test strip    [employer] Lancets Thin 26G MISC    dapagliflozin (Farxiga) 10 MG tablet    DULoxetine (CYMBALTA) 60 mg delayed release capsule    EPINEPHrine (EPIPEN) 0.3 mg/0.3 mL SOAJ    fluticasone (FLONASE) 50 mcg/act nasal spray    glycopyrrolate-formoterol (BEVESPI AEROSPHERE) 9-4.8 MCG/ACT inhaler    Insulin Glargine Solostar (Lantus SoloStar) 100 UNIT/ML SOPN    insulin glulisine (Apidra SoloStar) 100 units/mL injection pen    Insulin Pen Needle (BD Pen Needle Love 2nd Gen) 32G X 4 MM MISC    levalbuterol (XOPENEX) 1.25 mg/0.5 mL nebulizer solution    loratadine (CLARITIN) 10 mg tablet    LORazepam (ATIVAN) 0.5 mg tablet    metoprolol succinate (TOPROL-XL) 100 mg 24 hr tablet    montelukast (SINGULAIR) 10 mg tablet    naloxone (NARCAN) 4 mg/0.1 mL nasal spray    nystatin (MYCOSTATIN) powder    omeprazole (PriLOSEC) 40 MG capsule    potassium chloride (Klor-Con M20) 20 mEq tablet    spironolactone (ALDACTONE) 50 mg tablet    traZODone (DESYREL) 150 mg tablet       Allergies   Allergen Reactions    Iron Dextran Swelling    Januvia [Sitagliptin] Shortness Of Breath    Jardiance [Empagliflozin] Shortness Of Breath    Clonazepam Other (See Comments)     Unknown reaction    Codeine Itching and Other (See Comments)     itch;mary kay morphine,takes ultram @home    Adhesive [Medical Tape] Itching     itching    Effexor [Venlafaxine] Itching    Lactose - Food Allergy GI Intolerance    Oxycodone-Acetaminophen Anxiety     "Oxycodone-Acetaminophen Itching and Rash     Other reaction(s): Other (See Comments)  (PERCOCET) MILD RASH, not allergic to Acetaminophen          Portions of the record may have been created with voice recognition software.  Occasional wrong words or \"sound a like\" substitutions may have occurred due to the inherent limitations of voice recognition software.  Read the chart carefully and recognize, using context, where substitutions have occurred.                     "

## 2024-03-09 NOTE — ASSESSMENT & PLAN NOTE
Lab Results   Component Value Date    HGBA1C 8.5 (H) 01/22/2024       Recent Labs     03/06/24  1154 03/08/24  1700 03/08/24  2123 03/09/24  0757   POCGLU 301* 266* 260* 160*         Blood Sugar Average: Last 72 hrs:  (P) [id number]  Continue with home regimen of Lantus 45 units at bedtime, insulin lispro 15 units 3 times daily AC  Additional coverage with insulin on sliding scale  Carb consistent diet

## 2024-03-09 NOTE — ASSESSMENT & PLAN NOTE
Wt Readings from Last 3 Encounters:   03/09/24 123 kg (270 lb 1 oz)   03/08/24 122 kg (269 lb)   03/06/24 121 kg (266 lb)     Recently discharged on 3/6, due to acute on chronic diastolic heart failure  Last echo with EF of 65%, grade 2 diastolic dysfunction, right ventricular systolic pressure of 61  Currently appears euvolemic  Continue with Bumex 6 mg twice daily  Continue with Aldactone 100 mg daily  Continue with Farxiga 10 mg daily  Salt restriction, fluid restriction, strict I's and O's and daily weights  Cardiology following, appreciate recommendations

## 2024-03-09 NOTE — PROGRESS NOTES
Patient non compliant with fluid restriction. Pt. Requesting numerous cups of ice water and cups of hot broth after being educated that she is over her daily fluid restriction.

## 2024-03-10 VITALS
DIASTOLIC BLOOD PRESSURE: 68 MMHG | BODY MASS INDEX: 48.48 KG/M2 | SYSTOLIC BLOOD PRESSURE: 121 MMHG | OXYGEN SATURATION: 96 % | HEART RATE: 96 BPM | RESPIRATION RATE: 19 BRPM | TEMPERATURE: 98 F | WEIGHT: 273.59 LBS | HEIGHT: 63 IN

## 2024-03-10 LAB
ANION GAP SERPL CALCULATED.3IONS-SCNC: 9 MMOL/L
BASOPHILS # BLD AUTO: 0.03 THOUSANDS/ÂΜL (ref 0–0.1)
BASOPHILS NFR BLD AUTO: 0 % (ref 0–1)
BUN SERPL-MCNC: 24 MG/DL (ref 5–25)
CALCIUM SERPL-MCNC: 8.8 MG/DL (ref 8.4–10.2)
CHLORIDE SERPL-SCNC: 91 MMOL/L (ref 96–108)
CO2 SERPL-SCNC: 35 MMOL/L (ref 21–32)
CREAT SERPL-MCNC: 0.73 MG/DL (ref 0.6–1.3)
EOSINOPHIL # BLD AUTO: 0 THOUSAND/ÂΜL (ref 0–0.61)
EOSINOPHIL NFR BLD AUTO: 0 % (ref 0–6)
ERYTHROCYTE [DISTWIDTH] IN BLOOD BY AUTOMATED COUNT: 19.6 % (ref 11.6–15.1)
GFR SERPL CREATININE-BSD FRML MDRD: 86 ML/MIN/1.73SQ M
GLUCOSE SERPL-MCNC: 147 MG/DL (ref 65–140)
GLUCOSE SERPL-MCNC: 181 MG/DL (ref 65–140)
GLUCOSE SERPL-MCNC: 204 MG/DL (ref 65–140)
HCT VFR BLD AUTO: 39.8 % (ref 34.8–46.1)
HGB BLD-MCNC: 11.1 G/DL (ref 11.5–15.4)
IMM GRANULOCYTES # BLD AUTO: 0.05 THOUSAND/UL (ref 0–0.2)
IMM GRANULOCYTES NFR BLD AUTO: 0 % (ref 0–2)
LYMPHOCYTES # BLD AUTO: 2.36 THOUSANDS/ÂΜL (ref 0.6–4.47)
LYMPHOCYTES NFR BLD AUTO: 20 % (ref 14–44)
MCH RBC QN AUTO: 18.6 PG (ref 26.8–34.3)
MCHC RBC AUTO-ENTMCNC: 27.9 G/DL (ref 31.4–37.4)
MCV RBC AUTO: 67 FL (ref 82–98)
MONOCYTES # BLD AUTO: 1.07 THOUSAND/ÂΜL (ref 0.17–1.22)
MONOCYTES NFR BLD AUTO: 9 % (ref 4–12)
NEUTROPHILS # BLD AUTO: 8.22 THOUSANDS/ÂΜL (ref 1.85–7.62)
NEUTS SEG NFR BLD AUTO: 71 % (ref 43–75)
NRBC BLD AUTO-RTO: 0 /100 WBCS
PLATELET # BLD AUTO: 328 THOUSANDS/UL (ref 149–390)
POTASSIUM SERPL-SCNC: 4 MMOL/L (ref 3.5–5.3)
RBC # BLD AUTO: 5.98 MILLION/UL (ref 3.81–5.12)
SODIUM SERPL-SCNC: 135 MMOL/L (ref 135–147)
WBC # BLD AUTO: 11.73 THOUSAND/UL (ref 4.31–10.16)

## 2024-03-10 PROCEDURE — 80048 BASIC METABOLIC PNL TOTAL CA: CPT | Performed by: INTERNAL MEDICINE

## 2024-03-10 PROCEDURE — 94640 AIRWAY INHALATION TREATMENT: CPT

## 2024-03-10 PROCEDURE — 94760 N-INVAS EAR/PLS OXIMETRY 1: CPT

## 2024-03-10 PROCEDURE — 94660 CPAP INITIATION&MGMT: CPT

## 2024-03-10 PROCEDURE — 85025 COMPLETE CBC W/AUTO DIFF WBC: CPT | Performed by: INTERNAL MEDICINE

## 2024-03-10 PROCEDURE — 99239 HOSP IP/OBS DSCHRG MGMT >30: CPT | Performed by: PHYSICIAN ASSISTANT

## 2024-03-10 PROCEDURE — 82948 REAGENT STRIP/BLOOD GLUCOSE: CPT

## 2024-03-10 RX ORDER — DILTIAZEM HYDROCHLORIDE 120 MG/1
120 CAPSULE, COATED, EXTENDED RELEASE ORAL DAILY
Qty: 30 CAPSULE | Refills: 0 | Status: SHIPPED | OUTPATIENT
Start: 2024-03-11 | End: 2024-04-10

## 2024-03-10 RX ORDER — DILTIAZEM HYDROCHLORIDE 120 MG/1
120 CAPSULE, COATED, EXTENDED RELEASE ORAL DAILY
Status: DISCONTINUED | OUTPATIENT
Start: 2024-03-10 | End: 2024-03-10 | Stop reason: HOSPADM

## 2024-03-10 RX ADMIN — SPIRONOLACTONE 100 MG: 25 TABLET ORAL at 08:45

## 2024-03-10 RX ADMIN — BUMETANIDE 6 MG: 1 TABLET ORAL at 08:45

## 2024-03-10 RX ADMIN — ALBUTEROL SULFATE 2.5 MG: 2.5 SOLUTION RESPIRATORY (INHALATION) at 07:11

## 2024-03-10 RX ADMIN — POTASSIUM CHLORIDE 40 MEQ: 1500 TABLET, EXTENDED RELEASE ORAL at 08:45

## 2024-03-10 RX ADMIN — BUDESONIDE 0.5 MG: 0.5 INHALANT RESPIRATORY (INHALATION) at 07:11

## 2024-03-10 RX ADMIN — DILTIAZEM HYDROCHLORIDE 30 MG: 60 TABLET ORAL at 00:40

## 2024-03-10 RX ADMIN — EMPAGLIFLOZIN 10 MG: 10 TABLET, FILM COATED ORAL at 08:45

## 2024-03-10 RX ADMIN — INSULIN LISPRO 15 UNITS: 100 INJECTION, SOLUTION INTRAVENOUS; SUBCUTANEOUS at 08:45

## 2024-03-10 RX ADMIN — INSULIN LISPRO 1 UNITS: 100 INJECTION, SOLUTION INTRAVENOUS; SUBCUTANEOUS at 12:28

## 2024-03-10 RX ADMIN — INSULIN LISPRO 15 UNITS: 100 INJECTION, SOLUTION INTRAVENOUS; SUBCUTANEOUS at 12:28

## 2024-03-10 RX ADMIN — INSULIN LISPRO 1 UNITS: 100 INJECTION, SOLUTION INTRAVENOUS; SUBCUTANEOUS at 08:46

## 2024-03-10 RX ADMIN — PANTOPRAZOLE SODIUM 40 MG: 40 TABLET, DELAYED RELEASE ORAL at 06:24

## 2024-03-10 RX ADMIN — DILTIAZEM HYDROCHLORIDE 30 MG: 60 TABLET ORAL at 06:24

## 2024-03-10 RX ADMIN — DILTIAZEM HYDROCHLORIDE 120 MG: 120 CAPSULE, COATED, EXTENDED RELEASE ORAL at 12:27

## 2024-03-10 RX ADMIN — DULOXETINE HYDROCHLORIDE 60 MG: 30 CAPSULE, DELAYED RELEASE ORAL at 08:45

## 2024-03-10 RX ADMIN — METOPROLOL TARTRATE 50 MG: 50 TABLET ORAL at 08:45

## 2024-03-10 RX ADMIN — APIXABAN 5 MG: 5 TABLET, FILM COATED ORAL at 08:45

## 2024-03-10 NOTE — ASSESSMENT & PLAN NOTE
Chronically on 4 L of oxygen  Uses BiPAP at nighttime  Attributed to underlying severe COPD, and chronic diastolic heart failure  Continue with respiratory protocol   English

## 2024-03-10 NOTE — NURSING NOTE
Patient continues to be non compliant with fluid restriction. Requesting broth and sodium filled snacks. This RN educated patient on fluid restriction and low sodium snacks only.

## 2024-03-10 NOTE — ASSESSMENT & PLAN NOTE
Lab Results   Component Value Date    HGBA1C 8.5 (H) 01/22/2024       Recent Labs     03/09/24  1156 03/09/24  1610 03/09/24  2143 03/10/24  0718   POCGLU 208* 174* 292* 181*         Blood Sugar Average: Last 72 hrs:  (P) 220.125  Continue with home regimen of Lantus 45 units at bedtime, insulin lispro 15 units 3 times daily AC

## 2024-03-10 NOTE — ASSESSMENT & PLAN NOTE
Patient with chronic diastolic heart failure, noted to have heart rate in 130s at cardiology office 3/8  At risk of decompensated heart failure given paroxysmal atrial fibrillation with RVR    Continue with telemetry monitoring  Optimize electrolytes as needed  Home regimen Toprol  mg daily and Eliquis 5 mg twice daily  Continue Toprol- mg daily  Start Cardizem  mg daily  Continue with Eliquis for anticoagulation  Appreciate cardiology assistance, she has follow-up on 3/13

## 2024-03-10 NOTE — DISCHARGE SUMMARY
Formerly [facility], [facility]  Discharge- Mattie Joy 1960, 63 y.o. female MRN: [id number]  Unit/Bed#: -01 Encounter: [id number]  Primary Care Provider: Laith Olivares MD   Date and time admitted to hospital: 3/8/2024  2:22 PM    * Paroxysmal atrial fibrillation with RVR  Assessment & Plan  Patient with chronic diastolic heart failure, noted to have heart rate in 130s at cardiology office 3/8  At risk of decompensated heart failure given paroxysmal atrial fibrillation with RVR    Continue with telemetry monitoring  Optimize electrolytes as needed  Home regimen Toprol  mg daily and Eliquis 5 mg twice daily  Continue Toprol- mg daily  Start Cardizem  mg daily  Continue with Eliquis for anticoagulation  Appreciate cardiology assistance, she has follow-up on 3/13    COPD, severe (HCC)  Assessment & Plan  Follows with pulmonology  Continue with albuterol inhaler/nebulizer as needed, Pulmicort as needed, Claritin, Singulair   Respiratory protocol  Currently at baseline oxygen requirement 4 L    Morbid obesity (HCC)  Assessment & Plan  Lifestyle modifications advised    Chronic respiratory failure with hypoxia and hypercapnia ([facility])  Assessment & Plan  Chronically on 4 L of oxygen  Uses BiPAP at nighttime  Attributed to underlying severe COPD, and chronic diastolic heart failure  Continue with respiratory protocol    Chronic diastolic CHF (congestive heart failure) ([facility])  Assessment & Plan  Wt Readings from Last 3 Encounters:   03/10/24 124 kg (273 lb 9.5 oz)   03/08/24 122 kg (269 lb)   03/06/24 121 kg (266 lb)     Recently discharged on 3/6, due to acute on chronic diastolic heart failure  Last echo with EF of 65%, grade 2 diastolic dysfunction, right ventricular systolic pressure of 61  Currently appears euvolemic  Continue with Bumex 6 mg twice daily  Continue with Aldactone 100 mg daily  Continue with Farxiga 10 mg daily  Salt restriction, fluid restriction, strict I's and  O's and daily weights  Cardiology following, appreciate recommendations    Type 2 diabetes mellitus with stage 2 chronic kidney disease, with long-term current use of insulin (HCC)  Assessment & Plan  Lab Results   Component Value Date    HGBA1C 8.5 (H) 01/22/2024       Recent Labs     03/09/24  1156 03/09/24  1610 03/09/24  2143 03/10/24  0718   POCGLU 208* 174* 292* 181*         Blood Sugar Average: Last 72 hrs:  (P) 220.125  Continue with home regimen of Lantus 45 units at bedtime, insulin lispro 15 units 3 times daily AC      Hypercholesterolemia  Assessment & Plan  Continue with atorvastatin 40 mg daily at bedtime    Obstructive sleep apnea  Assessment & Plan  Continue with BiPAP nightly    Esophageal reflux  Assessment & Plan  Continue with pantoprazole 40 mg daily      Medical Problems       Resolved Problems  Date Reviewed: 3/10/2024   None       Discharging Physician / Practitioner: Ko Tovar PA-C  PCP: Laith Olivares MD  Admission Date:   Admission Orders (From admission, onward)       Ordered        03/09/24 1328  Inpatient Admission  Once            03/08/24 1523  Place in Observation  Once                          Discharge Date: 03/10/24    Consultations During Hospital Stay:  Cardiology    Procedures Performed:   none    Significant Findings / Test Results:   As above    Incidental Findings:   none     Test Results Pending at Discharge (will require follow up):   none     Outpatient Tests Requested:  none    Complications:  none    Reason for Admission: a fib rvr    Hospital Course:   Mattie Joy is a 63 y.o. female patient who originally presented to the hospital on 3/8/2024 due to A-fib with RVR.  Was sent in by her cardiologist as she was in A-fib with RVR in the office.  Patient was continued on her beta-blocker and started on Cardizem orally.  Rates were achieved into the 70s and 80s, she will be placed on p.o. Cardizem CD1 120 mg daily.  Her Eliquis was continued.  She will be  "discharged today in stable condition back home with cardiology appointment later this week.      Please see above list of diagnoses and related plan for additional information.     Condition at Discharge: stable    Discharge Day Visit / Exam:   Subjective: Heart rate is controlled.  Patient has no complaints.  Has been ambulating.  No chest pain or shortness of breath.  Vitals: Blood Pressure: 121/68 (03/10/24 0720)  Pulse: 96 (03/10/24 0720)  Temperature: 98 °F (36.7 °C) (03/10/24 0720)  Temp Source: Axillary (03/09/24 2144)  Respirations: 19 (03/10/24 0720)  Height: 5' 3\" (160 cm) (03/10/24 0736)  Weight - Scale: 124 kg (273 lb 9.5 oz) (03/10/24 0538)  SpO2: 96 % (03/10/24 0736)  Exam:   Physical Exam  Vitals and nursing note reviewed.   Constitutional:       General: She is not in acute distress.     Appearance: Normal appearance. She is obese.   HENT:      Head: Normocephalic.      Mouth/Throat:      Mouth: Mucous membranes are moist.   Eyes:      General: No scleral icterus.     Pupils: Pupils are equal, round, and reactive to light.   Cardiovascular:      Rate and Rhythm: Normal rate. Rhythm irregularly irregular.      Heart sounds: No murmur heard.  Pulmonary:      Effort: Pulmonary effort is normal. No respiratory distress.      Breath sounds: Normal breath sounds. No wheezing, rhonchi or rales.   Abdominal:      General: Bowel sounds are normal. There is no distension.      Palpations: Abdomen is soft.      Tenderness: There is no abdominal tenderness.   Musculoskeletal:         General: No swelling.      Right lower leg: No edema.      Left lower leg: No edema.   Skin:     Capillary Refill: Capillary refill takes less than 2 seconds.   Neurological:      General: No focal deficit present.      Mental Status: She is alert and oriented to person, place, and time. Mental status is at baseline.          Discussion with Family: Patient declined call to .     Discharge instructions/Information to " patient and family:   See after visit summary for information provided to patient and family.      Provisions for Follow-Up Care:  See after visit summary for information related to follow-up care and any pertinent home health orders.       Disposition:   Home    Planned Readmission: no     Discharge Statement:  I spent 45 minutes discharging the patient. This time was spent on the day of discharge. I had direct contact with the patient on the day of discharge. Greater than 50% of the total time was spent examining patient, answering all patient questions, arranging and discussing plan of care with patient as well as directly providing post-discharge instructions.  Additional time then spent on discharge activities.    Discharge Medications:  See after visit summary for reconciled discharge medications provided to patient and/or family.      **Please Note: This note may have been constructed using a voice recognition system**

## 2024-03-10 NOTE — ASSESSMENT & PLAN NOTE
Wt Readings from Last 3 Encounters:   03/10/24 124 kg (273 lb 9.5 oz)   03/08/24 122 kg (269 lb)   03/06/24 121 kg (266 lb)     Recently discharged on 3/6, due to acute on chronic diastolic heart failure  Last echo with EF of 65%, grade 2 diastolic dysfunction, right ventricular systolic pressure of 61  Currently appears euvolemic  Continue with Bumex 6 mg twice daily  Continue with Aldactone 100 mg daily  Continue with Farxiga 10 mg daily  Salt restriction, fluid restriction, strict I's and O's and daily weights  Cardiology following, appreciate recommendations

## 2024-03-10 NOTE — PLAN OF CARE
Problem: CARDIOVASCULAR - ADULT  Goal: Maintains optimal cardiac output and hemodynamic stability  Description: INTERVENTIONS:  - Monitor I/O, vital signs and rhythm  - Monitor for S/S and trends of decreased cardiac output  - Administer and titrate ordered vasoactive medications to optimize hemodynamic stability  - Assess quality of pulses, skin color and temperature  - Assess for signs of decreased coronary artery perfusion  - Instruct patient to report change in severity of symptoms  3/9/2024 2341 by Bindu Narayan RN  Outcome: Progressing  3/9/2024 2340 by Bindu Narayan RN  Outcome: Progressing  Goal: Absence of cardiac dysrhythmias or at baseline rhythm  Description: INTERVENTIONS:  - Continuous cardiac monitoring, vital signs, obtain 12 lead EKG if ordered  - Administer antiarrhythmic and heart rate control medications as ordered  - Monitor electrolytes and administer replacement therapy as ordered  3/9/2024 2341 by Bindu Narayan RN  Outcome: Progressing  3/9/2024 2340 by Bindu Narayan RN  Outcome: Progressing     Problem: RESPIRATORY - ADULT  Goal: Achieves optimal ventilation and oxygenation  Description: INTERVENTIONS:  - Assess for changes in respiratory status  - Assess for changes in mentation and behavior  - Position to facilitate oxygenation and minimize respiratory effort  - Oxygen administered by appropriate delivery if ordered  - Initiate smoking cessation education as indicated  - Encourage broncho-pulmonary hygiene including cough, deep breathe, Incentive Spirometry  - Assess the need for suctioning and aspirate as needed  - Assess and instruct to report SOB or any respiratory difficulty  - Respiratory Therapy support as indicated  Outcome: Progressing     Problem: PAIN - ADULT  Goal: Verbalizes/displays adequate comfort level or baseline comfort level  Description: Interventions:  - Encourage patient to monitor pain and request assistance  - Assess pain using appropriate pain scale  -  Administer analgesics based on type and severity of pain and evaluate response  - Implement non-pharmacological measures as appropriate and evaluate response  - Consider cultural and social influences on pain and pain management  - Notify physician/advanced practitioner if interventions unsuccessful or patient reports new pain  Outcome: Progressing     Problem: INFECTION - ADULT  Goal: Absence or prevention of progression during hospitalization  Description: INTERVENTIONS:  - Assess and monitor for signs and symptoms of infection  - Monitor lab/diagnostic results  - Monitor all insertion sites, i.e. indwelling lines, tubes, and drains  - Monitor endotracheal if appropriate and nasal secretions for changes in amount and color  - [place] appropriate cooling/warming therapies per order  - Administer medications as ordered  - Instruct and encourage patient and family to use good hand hygiene technique  - Identify and instruct in appropriate isolation precautions for identified infection/condition  Outcome: Progressing  Goal: Absence of fever/infection during neutropenic period  Description: INTERVENTIONS:  - Monitor WBC    Outcome: Progressing     Problem: SAFETY ADULT  Goal: Patient will remain free of falls  Description: INTERVENTIONS:  - Educate patient/family on patient safety including physical limitations  - Instruct patient to call for assistance with activity   - Consult OT/PT to assist with strengthening/mobility   - Keep Call bell within reach  - Keep bed low and locked with side rails adjusted as appropriate  - Keep care items and personal belongings within reach  - Initiate and maintain comfort rounds  - Make Fall Risk Sign visible to staff  - Offer Toileting every 2 Hours, in advance of need  - Initiate/Maintain bed alarm  - Obtain necessary fall risk management equipment:   - Apply yellow socks and bracelet for high fall risk patients  - Consider moving patient to room near nurses station  Outcome:  Progressing  Goal: Maintain or return to baseline ADL function  Description: INTERVENTIONS:  -  Assess patient's ability to carry out ADLs; assess patient's baseline for ADL function and identify physical deficits which impact ability to perform ADLs (bathing, care of mouth/teeth, toileting, grooming, dressing, etc.)  - Assess/evaluate cause of self-care deficits   - Assess range of motion  - Assess patient's mobility; develop plan if impaired  - Assess patient's need for assistive devices and provide as appropriate  - Encourage maximum independence but intervene and supervise when necessary  - Involve family in performance of ADLs  - Assess for home care needs following discharge   - Consider OT consult to assist with ADL evaluation and planning for discharge  - Provide patient education as appropriate  Outcome: Progressing  Goal: Maintains/Returns to pre admission functional level  Description: INTERVENTIONS:  - Perform AM-PAC 6 Click Basic Mobility/ Daily Activity assessment daily.  - Set and communicate daily mobility goal to care team and patient/family/caregiver.   - Collaborate with rehabilitation services on mobility goals if consulted  - Perform Range of Motion 4 times a day.  - Reposition patient every 2 hours.  - Dangle patient 3 times a day  - Stand patient 3 times a day  - Ambulate patient 3 times a day  - Out of bed to chair 3 times a day   - Out of bed for meals 3 times a day  - Out of bed for toileting  - Record patient progress and toleration of activity level   Outcome: Progressing

## 2024-03-10 NOTE — PLAN OF CARE
Problem: CARDIOVASCULAR - ADULT  Goal: Maintains optimal cardiac output and hemodynamic stability  Description: INTERVENTIONS:  - Monitor I/O, vital signs and rhythm  - Monitor for S/S and trends of decreased cardiac output  - Administer and titrate ordered vasoactive medications to optimize hemodynamic stability  - Assess quality of pulses, skin color and temperature  - Assess for signs of decreased coronary artery perfusion  - Instruct patient to report change in severity of symptoms  Outcome: Progressing  Goal: Absence of cardiac dysrhythmias or at baseline rhythm  Description: INTERVENTIONS:  - Continuous cardiac monitoring, vital signs, obtain 12 lead EKG if ordered  - Administer antiarrhythmic and heart rate control medications as ordered  - Monitor electrolytes and administer replacement therapy as ordered  Outcome: Progressing     Problem: RESPIRATORY - ADULT  Goal: Achieves optimal ventilation and oxygenation  Description: INTERVENTIONS:  - Assess for changes in respiratory status  - Assess for changes in mentation and behavior  - Position to facilitate oxygenation and minimize respiratory effort  - Oxygen administered by appropriate delivery if ordered  - Initiate smoking cessation education as indicated  - Encourage broncho-pulmonary hygiene including cough, deep breathe, Incentive Spirometry  - Assess the need for suctioning and aspirate as needed  - Assess and instruct to report SOB or any respiratory difficulty  - Respiratory Therapy support as indicated  Outcome: Progressing     Problem: PAIN - ADULT  Goal: Verbalizes/displays adequate comfort level or baseline comfort level  Description: Interventions:  - Encourage patient to monitor pain and request assistance  - Assess pain using appropriate pain scale  - Administer analgesics based on type and severity of pain and evaluate response  - Implement non-pharmacological measures as appropriate and evaluate response  - Consider cultural and social  influences on pain and pain management  - Notify physician/advanced practitioner if interventions unsuccessful or patient reports new pain  Outcome: Progressing     Problem: INFECTION - ADULT  Goal: Absence or prevention of progression during hospitalization  Description: INTERVENTIONS:  - Assess and monitor for signs and symptoms of infection  - Monitor lab/diagnostic results  - Monitor all insertion sites, i.e. indwelling lines, tubes, and drains  - Monitor endotracheal if appropriate and nasal secretions for changes in amount and color  - [place] appropriate cooling/warming therapies per order  - Administer medications as ordered  - Instruct and encourage patient and family to use good hand hygiene technique  - Identify and instruct in appropriate isolation precautions for identified infection/condition  Outcome: Progressing  Goal: Absence of fever/infection during neutropenic period  Description: INTERVENTIONS:  - Monitor WBC    Outcome: Progressing     Problem: SAFETY ADULT  Goal: Patient will remain free of falls  Description: INTERVENTIONS:  - Educate patient/family on patient safety including physical limitations  - Instruct patient to call for assistance with activity   - Consult OT/PT to assist with strengthening/mobility   - Keep Call bell within reach  - Keep bed low and locked with side rails adjusted as appropriate  - Keep care items and personal belongings within reach  - Initiate and maintain comfort rounds  - Make Fall Risk Sign visible to staff  - Offer Toileting every 2 Hours, in advance of need  - Initiate/Maintain bed alarm  - Obtain necessary fall risk management equipment: non slip socks  - Apply yellow socks and bracelet for high fall risk patients  - Consider moving patient to room near nurses station  Outcome: Progressing  Goal: Maintain or return to baseline ADL function  Description: INTERVENTIONS:  -  Assess patient's ability to carry out ADLs; assess patient's baseline for ADL  function and identify physical deficits which impact ability to perform ADLs (bathing, care of mouth/teeth, toileting, grooming, dressing, etc.)  - Assess/evaluate cause of self-care deficits   - Assess range of motion  - Assess patient's mobility; develop plan if impaired  - Assess patient's need for assistive devices and provide as appropriate  - Encourage maximum independence but intervene and supervise when necessary  - Involve family in performance of ADLs  - Assess for home care needs following discharge   - Consider OT consult to assist with ADL evaluation and planning for discharge  - Provide patient education as appropriate  Outcome: Progressing  Goal: Maintains/Returns to pre admission functional level  Description: INTERVENTIONS:  - Perform AM-PAC 6 Click Basic Mobility/ Daily Activity assessment daily.  - Set and communicate daily mobility goal to care team and patient/family/caregiver.   - Collaborate with rehabilitation services on mobility goals if consulted  - Perform Range of Motion 5 times a day.  - Reposition patient every 2 hours.  - Dangle patient 3 times a day  - Stand patient 3 times a day  - Ambulate patient 3 times a day  - Out of bed to chair 3 times a day   - Out of bed for meals 3 times a day  - Out of bed for toileting  - Record patient progress and toleration of activity level   Outcome: Progressing     Problem: DISCHARGE PLANNING  Goal: Discharge to home or other facility with appropriate resources  Description: INTERVENTIONS:  - Identify barriers to discharge w/patient and caregiver  - Arrange for needed discharge resources and transportation as appropriate  - Identify discharge learning needs (meds, wound care, etc.)  - Arrange for interpretive services to assist at discharge as needed  - Refer to Case Management Department for coordinating discharge planning if the patient needs post-hospital services based on physician/advanced practitioner order or complex needs related to  functional status, cognitive ability, or social support system  Outcome: Progressing     Problem: Knowledge Deficit  Goal: Patient/family/caregiver demonstrates understanding of disease process, treatment plan, medications, and discharge instructions  Description: Complete learning assessment and assess knowledge base.  Interventions:  - Provide teaching at level of understanding  - Provide teaching via preferred learning methods  Outcome: Progressing

## 2024-03-11 ENCOUNTER — TELEPHONE (OUTPATIENT)
Dept: FAMILY MEDICINE CLINIC | Facility: HOSPITAL | Age: 64
End: 2024-03-11

## 2024-03-11 ENCOUNTER — TRANSITIONAL CARE MANAGEMENT (OUTPATIENT)
Dept: FAMILY MEDICINE CLINIC | Facility: HOSPITAL | Age: 64
End: 2024-03-11

## 2024-03-11 NOTE — TELEPHONE ENCOUNTER
VIC---Vidya Gil---pt's caretaker states at last appt labs were ordered. Pt was recently in the hospital and would like to know if she still needs to get those labs done or if what was done in the hospital was enough. Please advise. [phone]

## 2024-03-12 ENCOUNTER — TELEPHONE (OUTPATIENT)
Age: 64
End: 2024-03-12

## 2024-03-12 NOTE — TELEPHONE ENCOUNTER
[facility] pharmacy calling  to see if pt was discharged from hospital and following up with hospital admission regarding [place].

## 2024-03-13 ENCOUNTER — OFFICE VISIT (OUTPATIENT)
Dept: CARDIOLOGY CLINIC | Facility: CLINIC | Age: 64
End: 2024-03-13
Payer: MEDICARE

## 2024-03-13 VITALS
DIASTOLIC BLOOD PRESSURE: 66 MMHG | HEIGHT: 63 IN | HEART RATE: 101 BPM | WEIGHT: 265 LBS | BODY MASS INDEX: 46.95 KG/M2 | OXYGEN SATURATION: 92 % | SYSTOLIC BLOOD PRESSURE: 116 MMHG

## 2024-03-13 DIAGNOSIS — I50.33 ACUTE ON CHRONIC DIASTOLIC CHF (CONGESTIVE HEART FAILURE) (HCC): Primary | ICD-10-CM

## 2024-03-13 PROCEDURE — 99214 OFFICE O/P EST MOD 30 MIN: CPT | Performed by: PHYSICIAN ASSISTANT

## 2024-03-13 NOTE — PATIENT INSTRUCTIONS
No changes to medications today, please continue everything the same. If you need refills of your cardiac medications prescribed by our office, please call us ahead of time so that they can be filled.   We will see you back in the office in 2 weeks. If you have any questions or concerns in the mean time please do not hesitate to call our office.    Please check blood work 2-3 days prior to your next office visit

## 2024-03-14 LAB
BACTERIA BLD CULT: NORMAL
BACTERIA BLD CULT: NORMAL

## 2024-03-15 ENCOUNTER — RA CDI HCC (OUTPATIENT)
Dept: OTHER | Facility: HOSPITAL | Age: 64
End: 2024-03-15

## 2024-03-18 ENCOUNTER — OFFICE VISIT (OUTPATIENT)
Dept: ENDOCRINOLOGY | Facility: HOSPITAL | Age: 64
End: 2024-03-18
Payer: MEDICARE

## 2024-03-18 VITALS
HEART RATE: 98 BPM | SYSTOLIC BLOOD PRESSURE: 110 MMHG | WEIGHT: 268 LBS | HEIGHT: 63 IN | OXYGEN SATURATION: 91 % | DIASTOLIC BLOOD PRESSURE: 78 MMHG | BODY MASS INDEX: 47.48 KG/M2

## 2024-03-18 DIAGNOSIS — E11.22 TYPE 2 DIABETES MELLITUS WITH STAGE 2 CHRONIC KIDNEY DISEASE, WITH LONG-TERM CURRENT USE OF INSULIN (HCC): Primary | ICD-10-CM

## 2024-03-18 DIAGNOSIS — Z79.4 TYPE 2 DIABETES MELLITUS WITH STAGE 2 CHRONIC KIDNEY DISEASE, WITH LONG-TERM CURRENT USE OF INSULIN (HCC): Primary | ICD-10-CM

## 2024-03-18 DIAGNOSIS — N18.2 TYPE 2 DIABETES MELLITUS WITH STAGE 2 CHRONIC KIDNEY DISEASE, WITH LONG-TERM CURRENT USE OF INSULIN (HCC): Primary | ICD-10-CM

## 2024-03-18 DIAGNOSIS — I13.0 HYPERTENSIVE HEART AND CHRONIC KIDNEY DISEASE WITH HEART FAILURE AND STAGE 1 THROUGH STAGE 4 CHRONIC KIDNEY DISEASE, OR CHRONIC KIDNEY DISEASE (HCC): ICD-10-CM

## 2024-03-18 DIAGNOSIS — E78.00 HYPERCHOLESTEROLEMIA: ICD-10-CM

## 2024-03-18 PROCEDURE — 99214 OFFICE O/P EST MOD 30 MIN: CPT | Performed by: PHYSICIAN ASSISTANT

## 2024-03-18 PROCEDURE — 95251 CONT GLUC MNTR ANALYSIS I&R: CPT | Performed by: PHYSICIAN ASSISTANT

## 2024-03-18 NOTE — PROGRESS NOTES
Mattie Joy 63 y.o. female MRN: [id number]    Encounter: [id number]      Assessment/Plan     Assessment:  This is a 63 y.o.-year-old female with type 2 diabetes with neuropathy and hyperlipidemia.    Plan:  1. Type 2 diabetes, insulin requiring: Most recent hemoglobin A1c was 8.5.  This was prior to her hospitalizations, so I do have a slight concern it may have increased over the last couple months.  That being said Dexcom is doing well at this time.  Unclear why the hospital discontinued her Ozempic.  Does not seem to be any contraindication at this time.  Restart Ozempic at 0.5 mg weekly.  I asked her to decrease her Lantus to 42 units daily.  She will continue with Apidra 15 units with meals and Farxiga 10 mg daily.  Continue utilizing the Dexcom to monitor glucose levels.  Would like to see a download in 2 weeks to see if we need to make adjustments to her medications.  Contact the office with any concerns or questions.  Follow-up in 3 months with lab work completed prior to visit.     Patient is type 2 diabetic currently utilizing 4 or more insulin injections a day.  She is checking her blood sugar for more times a day, and adjusting insulin doses according to current glucose levels.  Because of this, it would be beneficial for her to continue utilizing a Dexcom continuous glucose monitor.     2. Diabetic neuropathy:  Stable.  Diabetic foot exam is up-to-date..     3. History of stage 2 chronic kidney disease:  Kidney functions stable.  Chlorides are low but stable.  Possibly due to COPD.     4. Hyperlipidemia:  Most recent lipid panel looked excellent from December 2023.  Continue with current medication.  We will continue to monitor over time.    CC: Type 2 diabetes follow-up    History of Present Illness     HPI:  Mattie Joy is a 62 year old female with type 2 diabetes, insulin requiring for 8 year, hyperlipidemia for follow-up.   She was placed on insulin in June 2021 when her hemoglobin A1c went up  markedly.  She reportedly did try Januvia and Jardiance in the past but had problems with side effects on these medications. She is on oral agents and insulin at home and takes Lantus insulin 45 units at bedtime, Apidra 15 units with meals and Farxiga 10 mg daily.  Her metformin and Ozempic were discontinued during her hospitalizations over the last couple of months.  She admits to polyuria, polydipsia, polyphagia, nocturia 3-4 times a night, and blurry vision.  She has unusual sensations of her feet with painful sensations of her feet.  She denies chest pain but has shortness of breath with her lung disease.  She is on oxygen 24 hours a day. She denies retinopathy, heart attack, stroke and claudication but does admit to neuropathy and nephropathy.  Is doing well today.  She was admitted to the hospital 3 times from January 22 through March 8, 2024.  All 3 episodes were associated with her CHF and COPD.  States that she is doing fine since her discharge.  Her only concern from a diabetes standpoint is increased hunger since her Ozempic was discontinued.  Believes blood sugars have been doing well, has only recently restarted using her Dexcom.     Has followed up with [facility] November 2021.     Hypoglycemic episodes: No never.  H/o of hypoglycemia causing hospitalization or intervention such as glucagon injection  or ambulance call  No.  Hypoglycemia symptoms: never had one.  Treatment of hypoglycemia: would eat something sweet or sugar.  Glucagon:No.  Medic alert tag: recommended,Yes.      The patient's last eye exam was at June 2022.  The patient's last foot exam was in with podiatry, Dr. Blake Malik.  She last saw Podiatry several weeks ago and does every 3 months.  Last diabetic foot exam completed in the office was September 2023.     Blood Sugar/Glucometer/Pump/CGM review: Download of freestyle jennifer from March 5 through March 18, 2024 reveals an average glucose level of 204 with standard deviation  of 73.  She is in target range 48% of the time, above target range 52% time.  Glucose levels overnight typically trend down lowest first thing in the morning.  Glucose levels typically spike up significantly with each meal, but trend down before each meal.     She has hyperlipidemia and takes atorvastatin 40 mg daily.  She denies chest pain but has rare palpitations.     Review of Systems   Constitutional:  Positive for fatigue. Negative for activity change, appetite change and unexpected weight change.   HENT:  Negative for sore throat and trouble swallowing.    Eyes:  Negative for visual disturbance.   Respiratory:  Positive for shortness of breath (On portable oxygen). Negative for chest tightness.    Cardiovascular:  Negative for chest pain, palpitations and leg swelling.   Gastrointestinal:  Negative for abdominal pain, constipation, diarrhea, nausea and vomiting.   Endocrine: Positive for polydipsia, polyphagia and polyuria. Negative for cold intolerance and heat intolerance.   Genitourinary:  Negative for frequency.        Nocturia   Musculoskeletal:  Positive for gait problem (Utilizes wheelchair).   Skin:  Negative for wound.   Neurological:  Positive for numbness ( bilateral feet). Negative for dizziness, weakness and headaches.   Psychiatric/Behavioral:  Positive for dysphoric mood and sleep disturbance. The patient is not nervous/anxious.        Historical Information   Past Medical History:   Diagnosis Date   • Acute blood loss anemia 06/01/2021   • Acute diverticulitis 05/02/2021   • Acute on chronic diastolic congestive heart failure (HCC) 05/21/2020   • Acute on chronic respiratory failure with hypoxia ([facility]) 11/30/2018   • Alcohol abuse 05/21/2020   • Anemia    • Asthma with COPD with exacerbation  11/12/2020   • Atrial fibrillation ([facility])    • Cervical radiculopathy    • CHF (congestive heart failure) ([facility])    • Chronic low back pain    • Chronic obstructive asthma 02/20/2018   • Cigarette nicotine  dependence without complication 2019    Quit 12/10/2019.    • Community acquired pneumonia 2020   • COPD exacerbation (HCC) 2020   • Depression with anxiety 2014   • Diabetes mellitus with hyperglycemia (HCC)    • Diverticulitis 2021   • Elevated liver enzymes    • GERD (gastroesophageal reflux disease)    • Hypersomnolence 10/28/2020   • Hypokalemia 2018   • Lower gastrointestinal bleeding 2021   • Paresthesia of upper extremity    • Plantar fasciitis    • Restless legs syndrome 2014   • Severe persistent asthma with exacerbation 2018    PFTs 2018:  FEV1 0.87 L-35% predicted, 27% improvement post-bronchodilator.  DLCO-82% predicted,  The patient has been having worsening of her symptoms now for few months, especially  from 2020, also she had multiple exacerbations last year and most recently required a steroid burst and August  Reviewing her CT scan  New PFTs with severe obstruction reviewed  Hypersensitivity p   • Sexual dysfunction    • Sleep apnea, obstructive    • Tenosynovitis of finger    • Tinea corporis    • Tobacco use 2018    Currently smoking 3-4 cigarettes daily   • Trigger middle finger of left hand 2017   • Trigger ring finger of left hand 2017   • Weakness of upper extremity      Past Surgical History:   Procedure Laterality Date   • ABDOMINAL SURGERY     • CARPAL TUNNEL RELEASE Bilateral    •  SECTION     • CHOLECYSTECTOMY      laparoscopic   • ESOPHAGOGASTRODUODENOSCOPY N/A 12/3/2018    Procedure: ESOPHAGOGASTRODUODENOSCOPY (EGD) AND COLONOSCOPY;  Surgeon: Ludmila Perry MD;  Location: [facility];  Service: Gastroenterology   • GASTRIC BYPASS      for morbid obesity with gilda en y   • HERNIA REPAIR     • HYSTERECTOMY     • ID EXCISION GANGLION WRIST DORSAL/VOLAR PRIMARY Left 2017    Procedure: LONG FINGER GANGLION CYST EXCISION;  Surgeon: Philippe Clark MD;  Location: [facility];  Service:  Orthopedics   • MT TENDON SHEATH INCISION Left 2017    Procedure: THUMB, LONG, RING, TRIGGER FINGER RELEASE;  Surgeon: Philippe Clark MD;  Location: [facility];  Service: Orthopedics   • SHOULDER SURGERY Right      Social History   Social History     Substance and Sexual Activity   Alcohol Use Not Currently   • Alcohol/week: 20.0 standard drinks of alcohol   • Types: 20 Cans of beer per week    Comment: about 6 coors light every night, stopped in 2019     Social History     Substance and Sexual Activity   Drug Use No     Social History     Tobacco Use   Smoking Status Former   • Current packs/day: 0.00   • Average packs/day: 0.3 packs/day for 29.9 years (7.5 ttl pk-yrs)   • Types: Cigarettes   • Start date:    • Quit date: 12/10/2019   • Years since quittin.2   Smokeless Tobacco Never     Family History:   Family History   Problem Relation Age of Onset   • Alzheimer's disease Mother    • Atrial fibrillation Mother    • Dementia Mother    • Heart disease Mother         heart problem   • Seizures Mother    • Parkinsonism Father    • Arthritis Father    • Atrial fibrillation Father    • No Known Problems Daughter    • Cri-du-chat syndrome Daughter    • Colon cancer Maternal Grandmother 80   • Diabetes type II Maternal Grandmother    • No Known Problems Brother    • No Known Problems Son    • Substance Abuse Neg Hx         mother,father   • Mental illness Neg Hx         mother,father   • Colon polyps Neg Hx        Meds/Allergies   Current Outpatient Medications   Medication Sig Dispense Refill   • albuterol (2.5 mg/3 mL) 0.083 % nebulizer solution Take 3 mL (2.5 mg total) by nebulization every 6 (six) hours as needed for wheezing or shortness of breath 1080 mL 3   • albuterol (PROVENTIL HFA,VENTOLIN HFA) 90 mcg/act inhaler Inhale 2 puffs every 4 (four) hours as needed for wheezing 8.5 g 5   • apixaban (Eliquis) 5 mg Take 1 tablet (5 mg total) by mouth 2 (two) times a day 60 tablet 2   • atorvastatin  (LIPITOR) 40 mg tablet Take 1 tablet (40 mg total) by mouth daily 90 tablet 3   • Blood Glucose Monitoring Suppl (Contour Next One) AILYN USE AS DIRECTED 3 TIMES A DAY (Patient taking differently: USE AS DIRECTED 3 TIMES A DAY    Once a day) 1 each 0   • budesonide (PULMICORT) 0.5 mg/2 mL nebulizer solution TAKE 2 ML (0.5 MG TOTAL) BY NEBULIZATION TWICE A DAY RINSE MOUTH AFTER USE 60 mL 3   • bumetanide (BUMEX) 2 mg tablet Take 3 tablets (6 mg total) by mouth 2 (two) times a day 180 tablet 0   • Contour Next Test test strip USE TO TEST BLOOD SUGAR 3 TIMES A DAY (Patient taking differently: USE TO TEST BLOOD SUGAR 3 TIMES A DAY    Once a day) 100 strip 3   • [employer] Lancets Thin 26G MISC USE 3 (THREE) TIMES A DAY (Patient taking differently: Once a day) 100 each 5   • dapagliflozin (Farxiga) 10 MG tablet Take 1 tablet (10 mg total) by mouth daily 30 tablet 6   • diltiazem (CARDIZEM CD) 120 mg 24 hr capsule Take 1 capsule (120 mg total) by mouth daily Do not start before March 11, 2024. 30 capsule 0   • DULoxetine (CYMBALTA) 60 mg delayed release capsule Take 60 mg by mouth daily     • fluticasone (FLONASE) 50 mcg/act nasal spray 1 spray into each nostril 2 (two) times a day 1 Bottle 3   • glycopyrrolate-formoterol (BEVESPI AEROSPHERE) 9-4.8 MCG/ACT inhaler Inhale 2 puffs 2 (two) times a day 10.7 g 5   • Insulin Glargine Solostar (Lantus SoloStar) 100 UNIT/ML SOPN Inject 0.45 mL (45 Units total) as directed daily at bedtime Inject 45 units daily at bedtime 15 mL 0   • insulin glulisine (Apidra SoloStar) 100 units/mL injection pen Inject 15 Units under the skin 3 (three) times a day with meals 15 units 3 times a day with meals 15 mL 0   • Insulin Pen Needle (BD Pen Needle Love 2nd Gen) 32G X 4 MM MISC Use daily at bedtime Use 4 new needles daily . 400 each 3   • levalbuterol (XOPENEX) 1.25 mg/0.5 mL nebulizer solution Take 0.5 mL (1.25 mg total) by nebulization 2 (two) times a day 60 vial 0   • loratadine (CLARITIN) 10 mg  tablet Take 1 tablet (10 mg total) by mouth daily as needed for allergies 30 tablet 0   • LORazepam (ATIVAN) 0.5 mg tablet TAKE 2 TABLETS BY MOUTH AT BEDTIME AND 1 EVERY 6 HOURS AS NEEDED FOR ANXIETY 90 tablet 0   • metoprolol succinate (TOPROL-XL) 100 mg 24 hr tablet Take 1 tablet (100 mg total) by mouth daily 90 tablet 3   • montelukast (SINGULAIR) 10 mg tablet TAKE 1 TABLET BY MOUTH EVERYDAY AT BEDTIME 90 tablet 3   • naloxone (NARCAN) 4 mg/0.1 mL nasal spray Administer 1 spray into a nostril. If breathing does not return to normal or if breathing difficulty resumes after 2-3 minutes, give another dose in the other nostril using a new spray. 1 each 1   • nystatin (MYCOSTATIN) powder Apply topically 2 (two) times a day 30 g 0   • omeprazole (PriLOSEC) 40 MG capsule Take 1 capsule (40 mg total) by mouth daily 90 capsule 3   • potassium chloride (Klor-Con M20) 20 mEq tablet Take 2 tablets (40 mEq total) by mouth daily 180 tablet 3   • semaglutide, 0.25 or 0.5 mg/dose, (Ozempic, 0.25 or 0.5 MG/DOSE,) 2 mg/3 mL injection pen Inject 0.75 mL (0.5 mg total) under the skin every 7 days 9 mL 1   • spironolactone (ALDACTONE) 50 mg tablet Take 2 tablets (100 mg total) by mouth daily 30 tablet 0   • traZODone (DESYREL) 150 mg tablet TAKE 1 TABLET BY MOUTH EVERYDAY AT BEDTIME 90 tablet 0   • EPINEPHrine (EPIPEN) 0.3 mg/0.3 mL SOAJ Inject 0.3 mL (0.3 mg total) into a muscle once for 1 dose 0.6 mL 0     No current facility-administered medications for this visit.     Allergies   Allergen Reactions   • Iron Dextran Swelling   • Januvia [Sitagliptin] Shortness Of Breath   • Jardiance [Empagliflozin] Shortness Of Breath   • Clonazepam Other (See Comments)     Unknown reaction   • Codeine Itching and Other (See Comments)     itch;mary kay morphine,takes ultram @home   • Adhesive [Medical Tape] Itching     itching   • Effexor [Venlafaxine] Itching   • Lactose - Food Allergy GI Intolerance   • Oxycodone-Acetaminophen Anxiety   •  "Oxycodone-Acetaminophen Itching and Rash     Other reaction(s): Other (See Comments)  (PERCOCET) MILD RASH, not allergic to Acetaminophen        Objective   Vitals: Blood pressure 110/78, pulse 98, height 5' 3\" (1.6 m), weight 122 kg (268 lb), SpO2 91%, not currently breastfeeding.    Physical Exam  Vitals and nursing note reviewed.   Constitutional:       General: She is not in acute distress.     Appearance: Normal appearance. She is not diaphoretic.   HENT:      Head: Normocephalic and atraumatic.   Eyes:      General: No scleral icterus.     Extraocular Movements: Extraocular movements intact.      Conjunctiva/sclera: Conjunctivae normal.      Pupils: Pupils are equal, round, and reactive to light.   Cardiovascular:      Rate and Rhythm: Normal rate and regular rhythm.      Heart sounds: No murmur heard.  Pulmonary:      Effort: Pulmonary effort is normal. No respiratory distress.      Breath sounds: Decreased breath sounds present. No wheezing.      Comments: On Oxygen concentrator.   Musculoskeletal:      Cervical back: Normal range of motion.   Lymphadenopathy:      Cervical: No cervical adenopathy.   Neurological:      Mental Status: She is alert and oriented to person, place, and time. Mental status is at baseline.      Sensory: No sensory deficit.      Gait: Gait abnormal (utilizing a wheelchair).   Psychiatric:         Mood and Affect: Mood normal.         Behavior: Behavior normal.         Thought Content: Thought content normal.         The history was obtained from the review of the chart, patient.    Lab Results:   Lab Results   Component Value Date/Time    Hemoglobin A1C 8.5 (H) 01/22/2024 03:34 PM    Hemoglobin A1C 9.5 (H) 12/28/2023 11:17 AM    Hemoglobin A1C 10.7 (A) 09/20/2023 02:46 PM    Hemoglobin A1C 9.9 (H) 05/11/2023 10:05 AM    WBC 11.73 (H) 03/10/2024 04:25 AM    WBC 14.53 (H) 03/09/2024 05:07 AM    WBC 16.55 (H) 03/08/2024 02:48 PM    Hemoglobin 11.1 (L) 03/10/2024 04:25 AM    Hemoglobin " 11.5 03/09/2024 05:07 AM    Hemoglobin 11.0 (L) 03/08/2024 02:48 PM    Hematocrit 39.8 03/10/2024 04:25 AM    Hematocrit 40.5 03/09/2024 05:07 AM    Hematocrit 39.2 03/08/2024 02:48 PM    MCV 67 (L) 03/10/2024 04:25 AM    MCV 66 (L) 03/09/2024 05:07 AM    MCV 66 (L) 03/08/2024 02:48 PM    Platelets 328 03/10/2024 04:25 AM    Platelets 362 03/09/2024 05:07 AM    Platelets 362 03/08/2024 02:48 PM    BUN 24 03/10/2024 04:25 AM    BUN 25 03/09/2024 05:07 AM    BUN 26 (H) 03/08/2024 02:48 PM    BUN 13 12/28/2023 11:17 AM    BUN 15 05/11/2023 10:05 AM    Potassium 4.0 03/10/2024 04:25 AM    Potassium 4.1 03/09/2024 05:07 AM    Potassium 4.7 03/08/2024 02:48 PM    Potassium 3.9 12/28/2023 11:17 AM    Potassium 4.5 05/11/2023 10:05 AM    Chloride 91 (L) 03/10/2024 04:25 AM    Chloride 91 (L) 03/09/2024 05:07 AM    Chloride 90 (L) 03/08/2024 02:48 PM    Chloride 92 (L) 12/28/2023 11:17 AM    Chloride 91 (L) 05/11/2023 10:05 AM    CO2 35 (H) 03/10/2024 04:25 AM    CO2 31 03/09/2024 05:07 AM    CO2 33 (H) 03/08/2024 02:48 PM    CO2 29 12/28/2023 11:17 AM    CO2 31 (H) 05/11/2023 10:05 AM    CO2, i-STAT 33 (H) 02/26/2024 02:23 PM    CO2, i-STAT 39 (H) 01/22/2024 03:39 PM    CO2, i-STAT 38 (H) 09/05/2023 03:11 PM    Creatinine 0.73 03/10/2024 04:25 AM    Creatinine 0.74 03/09/2024 05:07 AM    Creatinine 0.74 03/08/2024 02:48 PM    AST 22 03/08/2024 02:48 PM    AST 15 03/04/2024 04:13 AM    AST 20 02/26/2024 02:08 PM    AST 21 12/28/2023 11:17 AM    AST 20 05/11/2023 10:05 AM    ALT 13 03/08/2024 02:48 PM    ALT 11 03/04/2024 04:13 AM    ALT 26 02/26/2024 02:08 PM    ALT 23 12/28/2023 11:17 AM    ALT 21 05/11/2023 10:05 AM    Total Protein 7.1 03/08/2024 02:48 PM    Total Protein 6.8 03/04/2024 04:13 AM    Total Protein 6.9 02/26/2024 02:08 PM    Protein, Total 6.3 12/28/2023 11:17 AM    Protein, Total 6.4 05/11/2023 10:05 AM    Albumin 4.2 03/08/2024 02:48 PM    Albumin 4.1 03/04/2024 04:13 AM    Albumin 4.1 02/26/2024 02:08 PM  "   Globulin, Total 2.1 12/28/2023 11:17 AM    Globulin, Total 2.1 05/11/2023 10:05 AM    HDL 45 12/28/2023 11:17 AM    Triglycerides 76 12/28/2023 11:17 AM         Portions of the record may have been created with voice recognition software. Occasional wrong word or \"sound a like\" substitutions may have occurred due to the inherent limitations of voice recognition software. Read the chart carefully and recognize, using context, where substitutions have occurred.    "

## 2024-03-18 NOTE — PATIENT INSTRUCTIONS
Monitor diet.  Make sure to drink plenty water throughout the day.     Restart Ozempic at 0.5 mg weekly.     Decrease Lantus to 42 units daily.  Continue Apidra  15 units with each meal.     Call in blood sugars in about 2 weeks so we can make adjustments.     Contact the office with any concerns or questions.     Follow-up in 3 months with lab work completed prior to visit.

## 2024-03-19 ENCOUNTER — TELEPHONE (OUTPATIENT)
Age: 64
End: 2024-03-19

## 2024-03-21 ENCOUNTER — TELEPHONE (OUTPATIENT)
Age: 64
End: 2024-03-21

## 2024-03-21 DIAGNOSIS — E11.22 TYPE 2 DIABETES MELLITUS WITH STAGE 2 CHRONIC KIDNEY DISEASE, WITH LONG-TERM CURRENT USE OF INSULIN (HCC): ICD-10-CM

## 2024-03-21 DIAGNOSIS — N18.2 TYPE 2 DIABETES MELLITUS WITH STAGE 2 CHRONIC KIDNEY DISEASE, WITH LONG-TERM CURRENT USE OF INSULIN (HCC): ICD-10-CM

## 2024-03-21 DIAGNOSIS — Z79.4 TYPE 2 DIABETES MELLITUS WITH STAGE 2 CHRONIC KIDNEY DISEASE, WITH LONG-TERM CURRENT USE OF INSULIN (HCC): ICD-10-CM

## 2024-03-21 NOTE — TELEPHONE ENCOUNTER
Previously sent to other pharmacy, but they do not have it in stock and patient requested alternate pharmacy. Thank you.

## 2024-03-21 NOTE — TELEPHONE ENCOUNTER
Pt is asking for the generic ozempic be called into the [place] [facility]'t [facility]  the [facility]

## 2024-03-22 ENCOUNTER — OFFICE VISIT (OUTPATIENT)
Dept: CARDIOLOGY CLINIC | Facility: CLINIC | Age: 64
End: 2024-03-22

## 2024-03-22 ENCOUNTER — DOCUMENTATION (OUTPATIENT)
Dept: ENDOCRINOLOGY | Facility: HOSPITAL | Age: 64
End: 2024-03-22

## 2024-03-22 ENCOUNTER — OFFICE VISIT (OUTPATIENT)
Dept: FAMILY MEDICINE CLINIC | Facility: HOSPITAL | Age: 64
End: 2024-03-22
Payer: MEDICARE

## 2024-03-22 VITALS
WEIGHT: 266 LBS | SYSTOLIC BLOOD PRESSURE: 100 MMHG | TEMPERATURE: 97.5 F | OXYGEN SATURATION: 90 % | BODY MASS INDEX: 47.13 KG/M2 | HEIGHT: 63 IN | HEART RATE: 102 BPM | DIASTOLIC BLOOD PRESSURE: 68 MMHG

## 2024-03-22 DIAGNOSIS — J82.83 EOSINOPHILIC ASTHMA: ICD-10-CM

## 2024-03-22 DIAGNOSIS — E11.42 DIABETIC POLYNEUROPATHY ASSOCIATED WITH TYPE 2 DIABETES MELLITUS (HCC): ICD-10-CM

## 2024-03-22 DIAGNOSIS — J44.9 COPD, SEVERE (HCC): ICD-10-CM

## 2024-03-22 DIAGNOSIS — J96.12 CHRONIC RESPIRATORY FAILURE WITH HYPOXIA AND HYPERCAPNIA (HCC): ICD-10-CM

## 2024-03-22 DIAGNOSIS — J96.11 CHRONIC RESPIRATORY FAILURE WITH HYPOXIA AND HYPERCAPNIA (HCC): ICD-10-CM

## 2024-03-22 DIAGNOSIS — I50.32 CHRONIC DIASTOLIC CHF (CONGESTIVE HEART FAILURE) (HCC): Primary | ICD-10-CM

## 2024-03-22 DIAGNOSIS — I13.0 HYPERTENSIVE HEART AND CHRONIC KIDNEY DISEASE WITH HEART FAILURE AND STAGE 1 THROUGH STAGE 4 CHRONIC KIDNEY DISEASE, OR CHRONIC KIDNEY DISEASE (HCC): ICD-10-CM

## 2024-03-22 DIAGNOSIS — F32.1 CURRENT MODERATE EPISODE OF MAJOR DEPRESSIVE DISORDER WITHOUT PRIOR EPISODE (HCC): ICD-10-CM

## 2024-03-22 DIAGNOSIS — I50.32 CHRONIC DIASTOLIC CONGESTIVE HEART FAILURE (HCC): Primary | ICD-10-CM

## 2024-03-22 DIAGNOSIS — E66.01 CLASS 3 SEVERE OBESITY WITHOUT SERIOUS COMORBIDITY WITH BODY MASS INDEX (BMI) OF 50.0 TO 59.9 IN ADULT, UNSPECIFIED OBESITY TYPE (HCC): ICD-10-CM

## 2024-03-22 DIAGNOSIS — I50.33 ACUTE ON CHRONIC DIASTOLIC CHF (CONGESTIVE HEART FAILURE) (HCC): Primary | ICD-10-CM

## 2024-03-22 PROCEDURE — 99214 OFFICE O/P EST MOD 30 MIN: CPT | Performed by: FAMILY MEDICINE

## 2024-03-22 PROCEDURE — RECHECK

## 2024-03-22 RX ORDER — GABAPENTIN 300 MG/1
300 CAPSULE ORAL DAILY
COMMUNITY
Start: 2024-03-21

## 2024-03-22 RX ORDER — METOLAZONE 5 MG/1
5 TABLET ORAL DAILY
Qty: 3 TABLET | Refills: 0 | Status: SHIPPED | OUTPATIENT
Start: 2024-03-22 | End: 2024-03-27 | Stop reason: ALTCHOICE

## 2024-03-22 NOTE — TELEPHONE ENCOUNTER
PA for Ozempic    Submitted via    []CMM-KEY   [x][facility]-Case ID # [id number]   []Faxed to plan   []Other website   []Phone call Case ID #     Office notes sent, clinical questions answered. Awaiting determination    Turnaround time for your insurance to make a decision on your Prior Authorization can take 7-21 business days.

## 2024-03-24 ENCOUNTER — TELEPHONE (OUTPATIENT)
Age: 64
End: 2024-03-24

## 2024-03-24 NOTE — TELEPHONE ENCOUNTER
PA for Ozempic    Submitted via    [x][employer]-KEY [id number]  [][employer]-Case ID #    []Faxed to plan   []Other website    []Phone call Case ID #      Office notes sent, clinical questions answered. Awaiting determination    Turnaround time for your insurance to make a decision on your Prior Authorization can take 7-21 business days.

## 2024-03-25 ENCOUNTER — TELEPHONE (OUTPATIENT)
Dept: CARDIOLOGY CLINIC | Facility: CLINIC | Age: 64
End: 2024-03-25

## 2024-03-25 DIAGNOSIS — I50.33 ACUTE ON CHRONIC DIASTOLIC CHF (CONGESTIVE HEART FAILURE) (HCC): Primary | ICD-10-CM

## 2024-03-25 NOTE — PROGRESS NOTES
Assessment & Plan     1. Chronic diastolic CHF (congestive heart failure) (HCC)    At this time she is euvolemic.   Need to adhere to fluid restrcition.   Need to weight herself daily.   Seeing palliative medicine and cardiology.   2. Chronic respiratory failure with hypoxia and hypercapnia (HCC)  Continue with pulmo.   On o2 and recently stable.   3. Class 3 severe obesity without serious comorbidity with body mass index (BMI) of 50.0 to 59.9 in adult, unspecified obesity type (HCC)  Need to work on dietary control.     4. COPD, severe (HCC)  Fu with pulmo. Currently no exacerbation.   5. Current moderate episode of major depressive disorder without prior episode (HCC)  Up and down. No changes made today.   No si/hi at this time.   6. Diabetic polyneuropathy associated with type 2 diabetes mellitus (HCC)  Stable. No changes made.   Need to adhere to DM diet.   7. Eosinophilic asthma  Combined with copd.   Ocntinue with same regimen.   8. Hypertensive heart and chronic kidney disease with heart failure and stage 1 through stage 4 chronic kidney disease, or chronic kidney disease (HCC)  Ocntinue to monitor.   Fu with nephrology.          Subjective     Transitional Care Management Review:   Mattie Joy is a 63 y.o. female here for TCM follow up.     During the TCM phone call patient stated:  TCM Call     Date and time call was made  3/11/2024  9:30 AM    Hospital care reviewed  Records reviewed    Patient was hospitialized at  [facility]    Date of Admission  03/08/24    Date of discharge  03/10/24    Diagnosis  Paroxysmal atrial fibrillation with RVR    Disposition  Home    Were the patients medications reviewed and updated  No    Current Symptoms  Weakness    Cough Severity  Mild    Loose stool severity  Severe    Shortness of breath severity  Moderate  Advised to follow-up with pulmonary    Weakness severity  Mild    Neausea severity  Moderate    Fatigue severity  Moderate      TCM Call     Post  "hospital issues  None    Should patient be enrolled in anticoag monitoring?  No    Scheduled for follow up?  Yes    Patients specialists  Cardiologist; Endocrinologist    Referrals needed  GI   6 weeks    Did you obtain your prescribed medications  Yes    Do you need help managing your prescriptions or medications  No    Is transportation to your appointment needed  No    I have advised the patient to call PCP with any new or worsening symptoms  Mariaelena Stanford MA    Living Arrangements  Alone    Support System  Family    Do you have social support  Yes, as much as I need    Are you recieving any outpatient services  Yes    What type of services  [facility]   therapy   nursing    Are you recieving home care services  Yes    Types of home care services  Home PT; Nurse visit    Are you using any community resources  No    Current waiver services  No    Have you fallen in the last 12 months  No    Interperter language line needed  Yes    Counseling  Patient        Mattie is here for Tcm. Admitted for respiratory failure. Had exacerbation of CHF. Diuretic adjusted.   She reports now being more compliant with her fluid restriction. Tries to adhere to a low salt diet.   Has been seeing palliative medicine as well. No new issues today.       Review of Systems   Constitutional:  Positive for activity change and fatigue. Negative for appetite change, chills, diaphoresis and fever.   HENT:  Negative for congestion, facial swelling and sore throat.    Respiratory:  Positive for shortness of breath. Negative for apnea, cough and chest tightness.    Cardiovascular: Negative.  Negative for chest pain and palpitations.   Gastrointestinal: Negative.  Negative for abdominal distention, abdominal pain, blood in stool, constipation, diarrhea and nausea.   Genitourinary: Negative.  Negative for difficulty urinating, dysuria, flank pain and frequency.       Objective     /68   Pulse 102   Temp 97.5 °F (36.4 °C)   Ht 5' 3\" (1.6 m)   " Wt 121 kg (266 lb)   SpO2 90% Comment: 4Lo2  BMI 47.12 kg/m²      Physical Exam  Vitals and nursing note reviewed.   Constitutional:       General: She is not in acute distress.     Appearance: Normal appearance. She is well-developed.   HENT:      Head: Normocephalic and atraumatic.      Right Ear: External ear normal.      Left Ear: External ear normal.      Nose: Nose normal.      Mouth/Throat:      Mouth: Mucous membranes are moist.   Eyes:      Conjunctiva/sclera: Conjunctivae normal.      Pupils: Pupils are equal, round, and reactive to light.   Cardiovascular:      Rate and Rhythm: Normal rate and regular rhythm.      Heart sounds: Normal heart sounds.   Pulmonary:      Effort: Pulmonary effort is normal.      Breath sounds: Normal breath sounds.   Abdominal:      General: Bowel sounds are normal.      Palpations: Abdomen is soft.   Musculoskeletal:      Cervical back: Normal range of motion and neck supple.   Skin:     General: Skin is warm and dry.   Neurological:      General: No focal deficit present.      Mental Status: She is alert and oriented to person, place, and time.   Psychiatric:         Mood and Affect: Mood normal.         Behavior: Behavior normal.       Medications have been reviewed by provider in current encounter    Laith Olivares MD

## 2024-03-25 NOTE — TELEPHONE ENCOUNTER
"This message was received on the [place] clinical line today.  Patient has an office visit on Wednesday with Michael in [place].    \"Hi, good morning. My name is Juanita. I am calling from [facility] cardiac monitoring in reference to one of Dr Jovanni Pacheco's patients. This patient is wearing one of our heart failure monitors and has a TF I alert for TF I level above normal. This patient is Vicki Joy. 1960. Again, this is in reference to an elevated TF I level .If you have further questions [phone] and that report will be available in the [facility] network. Thank you. Have a great day.\"    "

## 2024-03-25 NOTE — TELEPHONE ENCOUNTER
Prior auth was canceled because she was switched to a formulary alternative and/or quantity allowed by her benefit.

## 2024-03-27 ENCOUNTER — OFFICE VISIT (OUTPATIENT)
Dept: CARDIOLOGY CLINIC | Facility: CLINIC | Age: 64
End: 2024-03-27
Payer: MEDICARE

## 2024-03-27 ENCOUNTER — TELEPHONE (OUTPATIENT)
Dept: FAMILY MEDICINE CLINIC | Facility: HOSPITAL | Age: 64
End: 2024-03-27

## 2024-03-27 VITALS
BODY MASS INDEX: 45.96 KG/M2 | HEART RATE: 66 BPM | DIASTOLIC BLOOD PRESSURE: 72 MMHG | HEIGHT: 63 IN | SYSTOLIC BLOOD PRESSURE: 110 MMHG | WEIGHT: 259.4 LBS

## 2024-03-27 DIAGNOSIS — I50.32 CHRONIC DIASTOLIC HEART FAILURE (HCC): ICD-10-CM

## 2024-03-27 DIAGNOSIS — J44.1 COPD WITH ACUTE EXACERBATION (HCC): ICD-10-CM

## 2024-03-27 DIAGNOSIS — J96.21 ACUTE ON CHRONIC RESPIRATORY FAILURE WITH HYPOXIA (HCC): ICD-10-CM

## 2024-03-27 DIAGNOSIS — I50.9 ACUTE EXACERBATION OF CHF (CONGESTIVE HEART FAILURE) (HCC): ICD-10-CM

## 2024-03-27 DIAGNOSIS — Z79.899 ENCOUNTER FOR LONG-TERM (CURRENT) DRUG USE: ICD-10-CM

## 2024-03-27 DIAGNOSIS — I50.33 ACUTE ON CHRONIC DIASTOLIC HEART FAILURE (HCC): ICD-10-CM

## 2024-03-27 PROCEDURE — 99214 OFFICE O/P EST MOD 30 MIN: CPT | Performed by: PHYSICIAN ASSISTANT

## 2024-03-27 RX ORDER — SPIRONOLACTONE 50 MG/1
100 TABLET, FILM COATED ORAL DAILY
Start: 2024-03-31

## 2024-03-27 RX ORDER — DAPAGLIFLOZIN 10 MG/1
10 TABLET, FILM COATED ORAL DAILY
Start: 2024-03-31

## 2024-03-27 RX ORDER — BUMETANIDE 2 MG/1
6 TABLET ORAL 2 TIMES DAILY
Start: 2024-03-31

## 2024-03-27 RX ORDER — LORAZEPAM 0.5 MG/1
TABLET ORAL
Qty: 90 TABLET | Refills: 0 | Status: SHIPPED | OUTPATIENT
Start: 2024-03-27

## 2024-03-27 RX ORDER — POTASSIUM CHLORIDE 20 MEQ/1
40 TABLET, EXTENDED RELEASE ORAL DAILY
Start: 2024-03-31

## 2024-03-27 NOTE — TELEPHONE ENCOUNTER
SHE SAW IMELDA ALARCON AND HE WAS GOING TO CALL IN LORAZEPAM FOR HER BUT SHE CALLED PHARM AND NOTHING WAS CALLED IN.

## 2024-03-27 NOTE — PROGRESS NOTES
Cardiology Office Follow Up  Mattie Joy  1960  [id number]      ASSESSMENT:  Chronic diastolic heart failure  8/15/2023 echo: LVEF 65%, grade 2 diastolic dysfunction, severely elevated RV systolic pressure 61 mmHg  Current diuretic: Bumex 6 BID, Aldactone 100 QD, potassium 40 QD  Baseline wt around 265 lbs  Persistent atrial fibrillation  Thromboembolic PPx: Eliquis 5 twice daily  Rate control: Toprol- mg QD, Diltiazem 120 QD  COPD with acute exacerbation  Type 2 diabetes  Obesity, BMI 51  Pulmonary hypertension likely group 2/3 from left heart disease, underlying lung disease, OHV    PLAN/ DISCUSSION:  She looks euvolemic today and weight is now trending in the upper 250s but unfortunately on blood work earlier today she has developed an acute kidney injury with a creatinine of 1.9. She feels dizzy and fatigued. She did require metolazone over the weekend for volume overload and may be a bit over diuresed  We will hold Bumex, Aldactone, potassium, and dapagliflozin for the next 72 hours and then restart them all at present doses. It looks like her baseline weight is probably in the mid 260s. We will repeat a basic metabolic panel in 1 week and have her follow-up with us in 1 month  She is doing much better with sodium restriction  She is weighing herself every day  She is going to call if she has rapid weight gain in the next 3 days    Interval History/ HPI:   Since her last visit we were notified from the [facility] heart monitor that she was accumulating fluid.  Her weight was creeping up close to 270.  Metolazone was prescribed and she said she dropped close to 10 pounds.  She is now trending in the 250s.  She does have some dizziness and some fatigue.  She has some nausea.  She feels as if she is still urinating regularly.  Her breathing is stable.  According to her friend who has transported her here today she is doing much better with sodium restriction and weighing herself every day.     Vitals:  BP  "110/72 (BP Location: Left arm, Patient Position: Sitting, Cuff Size: Standard)   Pulse 66   Ht 5' 3\" (1.6 m)   Wt 118 kg (259 lb 6.4 oz)   BMI 45.95 kg/m²      Past Medical History:   Diagnosis Date    Acute blood loss anemia 06/01/2021    Acute diverticulitis 05/02/2021    Acute on chronic diastolic congestive heart failure (HCC) 05/21/2020    Acute on chronic respiratory failure with hypoxia (HCC) 11/30/2018    Alcohol abuse 05/21/2020    Anemia     Asthma with COPD with exacerbation  11/12/2020    Atrial fibrillation ([facility])     Cervical radiculopathy     CHF (congestive heart failure) ([facility])     Chronic low back pain     Chronic obstructive asthma 02/20/2018    Cigarette nicotine dependence without complication 11/20/2019    Quit 12/10/2019.     Community acquired pneumonia 05/21/2020    COPD exacerbation ([facility]) 09/16/2020    Depression with anxiety 03/09/2014    Diabetes mellitus with hyperglycemia ([facility])     Diverticulitis 06/06/2021    Elevated liver enzymes     GERD (gastroesophageal reflux disease)     Hypersomnolence 10/28/2020    Hypokalemia 12/04/2018    Lower gastrointestinal bleeding 06/01/2021    Paresthesia of upper extremity     Plantar fasciitis     Restless legs syndrome 04/08/2014    Severe persistent asthma with exacerbation 02/20/2018    PFTs February 8, 2018:  FEV1 0.87 L-35% predicted, 27% improvement post-bronchodilator.  DLCO-82% predicted,  The patient has been having worsening of her symptoms now for few months, especially  from January 2020, also she had multiple exacerbations last year and most recently required a steroid burst and August  Reviewing her CT scan  New PFTs with severe obstruction reviewed  Hypersensitivity p    Sexual dysfunction     Sleep apnea, obstructive     Tenosynovitis of finger     Tinea corporis     Tobacco use 02/20/2018    Currently smoking 3-4 cigarettes daily    Trigger middle finger of left hand 12/14/2017    Trigger ring finger of left hand 12/14/2017    " Weakness of upper extremity      Social History     Socioeconomic History    Marital status: Significant Other     Spouse name: Not on file    Number of children: Not on file    Years of education: Not on file    Highest education level: Not on file   Occupational History    Not on file   Tobacco Use    Smoking status: Former     Current packs/day: 0.00     Average packs/day: 0.3 packs/day for 29.9 years (7.5 ttl pk-yrs)     Types: Cigarettes     Start date:      Quit date: 12/10/2019     Years since quittin.2    Smokeless tobacco: Never   Vaping Use    Vaping status: Never Used   Substance and Sexual Activity    Alcohol use: Not Currently     Alcohol/week: 20.0 standard drinks of alcohol     Types: 20 Cans of beer per week     Comment: about 6 coors light every night, stopped in 2019    Drug use: No    Sexual activity: Not Currently   Other Topics Concern    Not on file   Social History Narrative    Not on file     Social Determinants of Health     Financial Resource Strain: Not on file   Food Insecurity: No Food Insecurity (2024)    Hunger Vital Sign     Worried About Running Out of Food in the Last Year: Never true     Ran Out of Food in the Last Year: Never true   Transportation Needs: No Transportation Needs (2024)    PRAPARE - Transportation     Lack of Transportation (Medical): No     Lack of Transportation (Non-Medical): No   Physical Activity: Not on file   Stress: Not on file   Social Connections: Not on file   Intimate Partner Violence: Not on file   Housing Stability: Low Risk  (2024)    Housing Stability Vital Sign     Unable to Pay for Housing in the Last Year: No     Number of Places Lived in the Last Year: 1     Unstable Housing in the Last Year: No      Family History   Problem Relation Age of Onset    Alzheimer's disease Mother     Atrial fibrillation Mother     Dementia Mother     Heart disease Mother         heart problem    Seizures Mother     Parkinsonism Father      Arthritis Father     Atrial fibrillation Father     No Known Problems Daughter     Cri-du-chat syndrome Daughter     Colon cancer Maternal Grandmother 80    Diabetes type II Maternal Grandmother     No Known Problems Brother     No Known Problems Son     Substance Abuse Neg Hx         mother,father    Mental illness Neg Hx         mother,father    Colon polyps Neg Hx      Past Surgical History:   Procedure Laterality Date    ABDOMINAL SURGERY      CARPAL TUNNEL RELEASE Bilateral      SECTION      CHOLECYSTECTOMY      laparoscopic    ESOPHAGOGASTRODUODENOSCOPY N/A 12/3/2018    Procedure: ESOPHAGOGASTRODUODENOSCOPY (EGD) AND COLONOSCOPY;  Surgeon: Ludmila Perry MD;  Location: [facility];  Service: Gastroenterology    GASTRIC BYPASS      for morbid obesity with gilda en y    HERNIA REPAIR      HYSTERECTOMY      AL EXCISION GANGLION WRIST DORSAL/VOLAR PRIMARY Left 2017    Procedure: LONG FINGER GANGLION CYST EXCISION;  Surgeon: Philippe Clark MD;  Location: [facility];  Service: Orthopedics    AL TENDON SHEATH INCISION Left 2017    Procedure: THUMB, LONG, RING, TRIGGER FINGER RELEASE;  Surgeon: Philippe Clark MD;  Location: [facility];  Service: Orthopedics    SHOULDER SURGERY Right        Current Outpatient Medications:     albuterol (2.5 mg/3 mL) 0.083 % nebulizer solution, Take 3 mL (2.5 mg total) by nebulization every 6 (six) hours as needed for wheezing or shortness of breath, Disp: 1080 mL, Rfl: 3    albuterol (PROVENTIL HFA,VENTOLIN HFA) 90 mcg/act inhaler, Inhale 2 puffs every 4 (four) hours as needed for wheezing, Disp: 8.5 g, Rfl: 5    apixaban (Eliquis) 5 mg, Take 1 tablet (5 mg total) by mouth 2 (two) times a day, Disp: 60 tablet, Rfl: 2    atorvastatin (LIPITOR) 40 mg tablet, Take 1 tablet (40 mg total) by mouth daily, Disp: 90 tablet, Rfl: 3    Blood Glucose Monitoring Suppl (Contour Next One) AILYN, USE AS DIRECTED 3 TIMES A DAY (Patient taking differently: USE AS DIRECTED 3  TIMES A DAY  Once a day), Disp: 1 each, Rfl: 0    budesonide (PULMICORT) 0.5 mg/2 mL nebulizer solution, TAKE 2 ML (0.5 MG TOTAL) BY NEBULIZATION TWICE A DAY RINSE MOUTH AFTER USE, Disp: 60 mL, Rfl: 3    bumetanide (BUMEX) 2 mg tablet, Take 3 tablets (6 mg total) by mouth 2 (two) times a day, Disp: 180 tablet, Rfl: 0    Contour Next Test test strip, USE TO TEST BLOOD SUGAR 3 TIMES A DAY (Patient taking differently: USE TO TEST BLOOD SUGAR 3 TIMES A DAY  Once a day), Disp: 100 strip, Rfl: 3    [employer] Lancets Thin 26G MISC, USE 3 (THREE) TIMES A DAY (Patient taking differently: Once a day), Disp: 100 each, Rfl: 5    dapagliflozin (Farxiga) 10 MG tablet, Take 1 tablet (10 mg total) by mouth daily, Disp: 30 tablet, Rfl: 6    diltiazem (CARDIZEM CD) 120 mg 24 hr capsule, Take 1 capsule (120 mg total) by mouth daily Do not start before March 11, 2024., Disp: 30 capsule, Rfl: 0    DULoxetine (CYMBALTA) 60 mg delayed release capsule, Take 60 mg by mouth daily, Disp: , Rfl:     EPINEPHrine (EPIPEN) 0.3 mg/0.3 mL SOAJ, Inject 0.3 mL (0.3 mg total) into a muscle once for 1 dose, Disp: 0.6 mL, Rfl: 0    fluticasone (FLONASE) 50 mcg/act nasal spray, 1 spray into each nostril 2 (two) times a day, Disp: 1 Bottle, Rfl: 3    gabapentin (NEURONTIN) 300 mg capsule, Take 300 mg by mouth daily, Disp: , Rfl:     glycopyrrolate-formoterol (BEVESPI AEROSPHERE) 9-4.8 MCG/ACT inhaler, Inhale 2 puffs 2 (two) times a day, Disp: 10.7 g, Rfl: 5    Insulin Glargine Solostar (Lantus SoloStar) 100 UNIT/ML SOPN, Inject 0.45 mL (45 Units total) as directed daily at bedtime Inject 45 units daily at bedtime, Disp: 15 mL, Rfl: 0    insulin glulisine (Apidra SoloStar) 100 units/mL injection pen, Inject 15 Units under the skin 3 (three) times a day with meals 15 units 3 times a day with meals, Disp: 15 mL, Rfl: 0    Insulin Pen Needle (BD Pen Needle Love 2nd Gen) 32G X 4 MM MISC, Use daily at bedtime Use 4 new needles daily ., Disp: 400 each, Rfl: 3     levalbuterol (XOPENEX) 1.25 mg/0.5 mL nebulizer solution, Take 0.5 mL (1.25 mg total) by nebulization 2 (two) times a day, Disp: 60 vial, Rfl: 0    loratadine (CLARITIN) 10 mg tablet, Take 1 tablet (10 mg total) by mouth daily as needed for allergies, Disp: 30 tablet, Rfl: 0    LORazepam (ATIVAN) 0.5 mg tablet, TAKE 2 TABLETS BY MOUTH AT BEDTIME AND 1 EVERY 6 HOURS AS NEEDED FOR ANXIETY, Disp: 90 tablet, Rfl: 0    metolazone (ZAROXOLYN) 5 mg tablet, Take 1 tablet (5 mg total) by mouth daily for 3 days, Disp: 3 tablet, Rfl: 0    metoprolol succinate (TOPROL-XL) 100 mg 24 hr tablet, Take 1 tablet (100 mg total) by mouth daily, Disp: 90 tablet, Rfl: 3    montelukast (SINGULAIR) 10 mg tablet, TAKE 1 TABLET BY MOUTH EVERYDAY AT BEDTIME, Disp: 90 tablet, Rfl: 3    naloxone (NARCAN) 4 mg/0.1 mL nasal spray, Administer 1 spray into a nostril. If breathing does not return to normal or if breathing difficulty resumes after 2-3 minutes, give another dose in the other nostril using a new spray., Disp: 1 each, Rfl: 1    nystatin (MYCOSTATIN) powder, Apply topically 2 (two) times a day, Disp: 30 g, Rfl: 0    omeprazole (PriLOSEC) 40 MG capsule, Take 1 capsule (40 mg total) by mouth daily, Disp: 90 capsule, Rfl: 3    potassium chloride (Klor-Con M20) 20 mEq tablet, Take 2 tablets (40 mEq total) by mouth daily, Disp: 180 tablet, Rfl: 3    semaglutide, 0.25 or 0.5 mg/dose, (Ozempic, 0.25 or 0.5 MG/DOSE,) 2 mg/3 mL injection pen, Inject 0.75 mL (0.5 mg total) under the skin every 7 days, Disp: 9 mL, Rfl: 1    spironolactone (ALDACTONE) 50 mg tablet, Take 2 tablets (100 mg total) by mouth daily, Disp: 30 tablet, Rfl: 0    traZODone (DESYREL) 150 mg tablet, TAKE 1 TABLET BY MOUTH EVERYDAY AT BEDTIME, Disp: 90 tablet, Rfl: 0      Review of Systems:  Review of Systems   Constitutional:  Positive for fatigue. Negative for chills and fever.   HENT:  Negative for ear pain and sore throat.    Eyes:  Negative for pain and visual disturbance.    Respiratory:  Negative for cough and shortness of breath.    Cardiovascular:  Negative for chest pain and palpitations.   Gastrointestinal:  Negative for abdominal pain and vomiting.   Genitourinary:  Negative for dysuria and hematuria.   Musculoskeletal:  Negative for arthralgias and back pain.   Skin:  Negative for color change and rash.   Neurological:  Positive for dizziness. Negative for seizures and syncope.   All other systems reviewed and are negative.        Physical Exam:  Physical Exam  Constitutional:       General: She is not in acute distress.     Appearance: Normal appearance. She is not ill-appearing.   HENT:      Head: Normocephalic and atraumatic.      Right Ear: External ear normal.      Left Ear: External ear normal.      Mouth/Throat:      Pharynx: No oropharyngeal exudate or posterior oropharyngeal erythema.   Eyes:      General:         Right eye: No discharge.         Left eye: No discharge.      Conjunctiva/sclera: Conjunctivae normal.      Pupils: Pupils are equal, round, and reactive to light.   Cardiovascular:      Rate and Rhythm: Normal rate and regular rhythm.      Pulses: Normal pulses.      Heart sounds: Normal heart sounds. No murmur heard.     No friction rub. No gallop.   Pulmonary:      Effort: Pulmonary effort is normal.      Breath sounds: Normal breath sounds. No wheezing, rhonchi or rales.   Abdominal:      General: Abdomen is flat. There is no distension.      Palpations: Abdomen is soft.      Tenderness: There is no abdominal tenderness.   Musculoskeletal:         General: No swelling, tenderness or deformity. Normal range of motion.      Cervical back: Normal range of motion.   Skin:     General: Skin is warm and dry.   Neurological:      General: No focal deficit present.      Mental Status: She is alert and oriented to person, place, and time.         This note was completed in part utilizing [employer] Software.  Grammatical errors, random word insertions,  spelling mistakes, and incomplete sentences can be an occasional consequence of this system secondary to software limitations, ambient noise, and hardware issues.  If you have any questions or concerns about the content, text, or information contained within the body of this dictation, please contact the provider for clarification.

## 2024-03-27 NOTE — PATIENT INSTRUCTIONS
Please HOLD Bumex, Farxiga, Spironolactone, and potassium supplements for the next 3 days and then restart them on Blaise 3/31/24  Please repeat blood work in about 1 week  We will see you back in about 1 month for a recheck

## 2024-03-28 NOTE — TELEPHONE ENCOUNTER
Pts friend called for her checking on status of this. Asking if medication can be sent to the new pharmacy    [facility] in [place].     Please advise.

## 2024-03-29 ENCOUNTER — TELEPHONE (OUTPATIENT)
Dept: CARDIOLOGY CLINIC | Facility: CLINIC | Age: 64
End: 2024-03-29

## 2024-03-29 ENCOUNTER — TELEPHONE (OUTPATIENT)
Dept: ENDOCRINOLOGY | Facility: HOSPITAL | Age: 64
End: 2024-03-29

## 2024-03-29 NOTE — TELEPHONE ENCOUNTER
Prior auth for Ozempic received via fax. Prior auth request scanned into media.     Cover my meds Key: [id number].

## 2024-03-29 NOTE — TELEPHONE ENCOUNTER
Received a call from Aleida who is a nurse with [facility].    She saw patient today.  When Mattie saw you on two days ago, you instructed her to hold meds for 72 hours and resume on Sunday, 3/31.  Mattie told the nurse she held the meds for one day and gained 5 lbs immediately. She resumed all med the next day on her own.  Her weight had been 259.8, 259.3 and today's weight is 263.4.    No edema or shortness of breath. Non complaint with diet and fluids.  The nurse will draw the BMP on 4/3.    ROJELIO

## 2024-03-29 NOTE — TELEPHONE ENCOUNTER
OK thank you for the update. Sounds good that she restarted everything. We will see how the blood work looks. Thanks!

## 2024-04-01 ENCOUNTER — TELEPHONE (OUTPATIENT)
Dept: CARDIOLOGY CLINIC | Facility: CLINIC | Age: 64
End: 2024-04-01

## 2024-04-01 NOTE — TELEPHONE ENCOUNTER
[facility] -   Notification that dapagliflozin is not on formulary, but Farxiga is listed as the suggested alternative.     Started PA through Cover My Meds - response is no PA needed.

## 2024-04-03 ENCOUNTER — TELEPHONE (OUTPATIENT)
Dept: CARDIOLOGY CLINIC | Facility: CLINIC | Age: 64
End: 2024-04-03

## 2024-04-03 DIAGNOSIS — I48.91 ATRIAL FIBRILLATION, UNSPECIFIED TYPE (HCC): ICD-10-CM

## 2024-04-03 NOTE — TELEPHONE ENCOUNTER
"HFMS fax came to our office today. This is a pt that is seen in [facility] office.  Attempted to ramy the pt on all 3 3\"s listed to see how she was doing and if she got the blood work drawn.    Got no answer and unable to leave a message.  Faxed [employer] HFMS form to [facility] office.  "

## 2024-04-04 ENCOUNTER — TELEPHONE (OUTPATIENT)
Dept: PULMONOLOGY | Facility: CLINIC | Age: 64
End: 2024-04-04

## 2024-04-04 DIAGNOSIS — I48.91 ATRIAL FIBRILLATION WITH RAPID VENTRICULAR RESPONSE (HCC): ICD-10-CM

## 2024-04-04 DIAGNOSIS — Z79.4 TYPE 2 DIABETES MELLITUS WITH STAGE 2 CHRONIC KIDNEY DISEASE, WITH LONG-TERM CURRENT USE OF INSULIN (HCC): ICD-10-CM

## 2024-04-04 DIAGNOSIS — E11.22 TYPE 2 DIABETES MELLITUS WITH STAGE 2 CHRONIC KIDNEY DISEASE, WITH LONG-TERM CURRENT USE OF INSULIN (HCC): ICD-10-CM

## 2024-04-04 DIAGNOSIS — J44.89 ASTHMA-COPD OVERLAP SYNDROME: Primary | ICD-10-CM

## 2024-04-04 DIAGNOSIS — N18.2 TYPE 2 DIABETES MELLITUS WITH STAGE 2 CHRONIC KIDNEY DISEASE, WITH LONG-TERM CURRENT USE OF INSULIN (HCC): ICD-10-CM

## 2024-04-04 DIAGNOSIS — J44.1 COPD WITH ACUTE EXACERBATION (HCC): ICD-10-CM

## 2024-04-04 DIAGNOSIS — E11.22 TYPE 2 DIABETES MELLITUS WITH STAGE 2 CHRONIC KIDNEY DISEASE, WITHOUT LONG-TERM CURRENT USE OF INSULIN  (HCC): ICD-10-CM

## 2024-04-04 DIAGNOSIS — N18.2 TYPE 2 DIABETES MELLITUS WITH STAGE 2 CHRONIC KIDNEY DISEASE, WITHOUT LONG-TERM CURRENT USE OF INSULIN  (HCC): ICD-10-CM

## 2024-04-04 DIAGNOSIS — I50.9 ACUTE EXACERBATION OF CHF (CONGESTIVE HEART FAILURE) (HCC): ICD-10-CM

## 2024-04-04 DIAGNOSIS — I50.33 ACUTE ON CHRONIC DIASTOLIC HEART FAILURE (HCC): ICD-10-CM

## 2024-04-04 NOTE — TELEPHONE ENCOUNTER
Pharmacy called regarding patient's Bevespi Inhaler. Her insurance will no longer cover it, but they will cover Anoro or Steleto Inhaler to be sent in. Please advise and reach out to the pharmacy with any questions.

## 2024-04-04 NOTE — TELEPHONE ENCOUNTER
Medication: diltiazem (CARDIZEM CD) 120 mg 24 hr capsule     Dose/Frequency: take 1 capsule (120 mg total) by mouth daily     Quantity: 30    Pharmacy: [address] [[id number]]     Office:   [] PCP/Provider -   [x] Speciality/Provider -     Does the patient have enough for 3 days?   [x] Yes   [] No - Send as HP to POD    Medication: bumetanide (BUMEX) 2 mg tablet     Dose/Frequency: Take 3 tablets (6 mg total) by mouth 2 (two) times a day     Quantity: n/a    Pharmacy: [address] [[id number]]     Office:   [] PCP/Provider -   [x] Speciality/Provider -     Does the patient have enough for 3 days?   [x] Yes   [] No - Send as HP to POD     Medication: spironolactone (ALDACTONE) 50 mg tablet     Dose/Frequency: Take 2 tablets (100 mg total) by mouth daily     Quantity: n/a    Pharmacy: [address] [[id number]]     Office:   [] PCP/Provider -   [x] Speciality/Provider -     Does the patient have enough for 3 days?   [x] Yes   [] No - Send as HP to POD

## 2024-04-04 NOTE — TELEPHONE ENCOUNTER
Reason for call:   [x] Refill   [] Prior Auth  [] Other:     Office:   [] PCP/Provider -   [x] Specialty/Provider - Lobito Mehta    Medication: Insulin Glargine Solostar (Lantus SoloStar)   Dose/Frequency: 45 Units Q HS  Quantity: 15 ml      Medication: insulin glulisine (Apidra SoloStar)   Dose/Frequency: 15 Units TID  Quantity: 15      Pharmacy: [facility] main st    Does the patient have enough for 3 days?   [x] Yes   [] No - Send as HP to POD   Patient was given prescription at [facility]

## 2024-04-05 ENCOUNTER — OFFICE VISIT (OUTPATIENT)
Dept: FAMILY MEDICINE CLINIC | Facility: HOSPITAL | Age: 64
End: 2024-04-05
Payer: MEDICARE

## 2024-04-05 ENCOUNTER — TELEPHONE (OUTPATIENT)
Dept: CARDIOLOGY CLINIC | Facility: CLINIC | Age: 64
End: 2024-04-05

## 2024-04-05 VITALS
HEART RATE: 88 BPM | DIASTOLIC BLOOD PRESSURE: 61 MMHG | HEIGHT: 63 IN | BODY MASS INDEX: 47.55 KG/M2 | SYSTOLIC BLOOD PRESSURE: 101 MMHG | OXYGEN SATURATION: 92 % | TEMPERATURE: 96.6 F | WEIGHT: 268.4 LBS

## 2024-04-05 DIAGNOSIS — L02.422 FURUNCLE OF LEFT AXILLA: Primary | ICD-10-CM

## 2024-04-05 PROCEDURE — 10060 I&D ABSCESS SIMPLE/SINGLE: CPT | Performed by: FAMILY MEDICINE

## 2024-04-05 PROCEDURE — 99213 OFFICE O/P EST LOW 20 MIN: CPT | Performed by: FAMILY MEDICINE

## 2024-04-05 RX ORDER — SPIRONOLACTONE 50 MG/1
100 TABLET, FILM COATED ORAL DAILY
Qty: 180 TABLET | Refills: 0 | Status: SHIPPED | OUTPATIENT
Start: 2024-04-05

## 2024-04-05 RX ORDER — DILTIAZEM HYDROCHLORIDE 120 MG/1
120 CAPSULE, COATED, EXTENDED RELEASE ORAL DAILY
Qty: 90 CAPSULE | Refills: 0 | Status: SHIPPED | OUTPATIENT
Start: 2024-04-05

## 2024-04-05 RX ORDER — INSULIN GLARGINE 100 [IU]/ML
42 INJECTION, SOLUTION SUBCUTANEOUS
Qty: 15 ML | Refills: 6 | Status: SHIPPED | OUTPATIENT
Start: 2024-04-05

## 2024-04-05 RX ORDER — INSULIN GLULISINE 100 [IU]/ML
15 INJECTION, SOLUTION SUBCUTANEOUS
Qty: 15 ML | Refills: 6 | Status: SHIPPED | OUTPATIENT
Start: 2024-04-05

## 2024-04-05 RX ORDER — TIOTROPIUM BROMIDE AND OLODATEROL 3.124; 2.736 UG/1; UG/1
2 SPRAY, METERED RESPIRATORY (INHALATION) DAILY
Qty: 12 G | Refills: 5 | Status: SHIPPED | OUTPATIENT
Start: 2024-04-05

## 2024-04-05 RX ORDER — BUMETANIDE 2 MG/1
6 TABLET ORAL 2 TIMES DAILY
Qty: 540 TABLET | Refills: 0 | Status: SHIPPED | OUTPATIENT
Start: 2024-04-05

## 2024-04-05 RX ORDER — CEPHALEXIN 500 MG/1
500 CAPSULE ORAL 3 TIMES DAILY
Qty: 21 CAPSULE | Refills: 0 | Status: SHIPPED | OUTPATIENT
Start: 2024-04-05 | End: 2024-04-12

## 2024-04-05 NOTE — TELEPHONE ENCOUNTER
LMOM will instructions as per pt.  Will call her back before I leave today to make sure she got the instructions.

## 2024-04-05 NOTE — PROGRESS NOTES
Incision and Drainage    Date/Time: 2024 2:40 PM    Performed by: Ko Masters MD  Authorized by: Ko Masters MD  Universal Protocol:  Consent: Verbal consent obtained. Written consent not obtained.  Risks and benefits: risks, benefits and alternatives were discussed  Consent given by: patient  Timeout called at: 2024 3:12 PM.  Patient understanding: patient states understanding of the procedure being performed  Patient consent: the patient's understanding of the procedure matches consent given  Patient identity confirmed: verbally with patient    Patient location:  Clinic  Location:     Type:  Abscess    Location:  Upper extremity    Upper extremity location:  L arm  Pre-procedure details:     Skin preparation:  Betadine  Anesthesia (see MAR for exact dosages):     Anesthesia method:  Local infiltration  Procedure details:     Complexity:  Simple    Needle aspiration: no      Incision types:  Stab incision    Scalpel blade:  11    Approach:  Puncture    Wound management:  Probed and deloculated    Drainage:  Purulent and bloody    Drainage amount:  Moderate    Wound treatment:  Wound left open  Post-procedure details:     Patient tolerance of procedure:  Tolerated well, no immediate complications    Name: Mattie Joy      : 1960      MRN: [id number]  Encounter Provider: Ko Masters MD  Encounter Date: 2024   Encounter department: [facility] PRIMARY CARE [address]     Assessment & Plan     1. Furuncle of left axilla  -     cephalexin (KEFLEX) 500 mg capsule; Take 1 capsule (500 mg total) by mouth 3 (three) times a day for 7 days           Subjective      Acute visit for sore lump under arm that is warm to touch for the past 3 days      Review of Systems    Current Outpatient Medications on File Prior to Visit   Medication Sig    albuterol (2.5 mg/3 mL) 0.083 % nebulizer solution Take 3 mL (2.5 mg total) by nebulization every 6 (six) hours as needed for wheezing or  shortness of breath    albuterol (PROVENTIL HFA,VENTOLIN HFA) 90 mcg/act inhaler Inhale 2 puffs every 4 (four) hours as needed for wheezing    apixaban (Eliquis) 5 mg Take 1 tablet (5 mg total) by mouth 2 (two) times a day    atorvastatin (LIPITOR) 40 mg tablet Take 1 tablet (40 mg total) by mouth daily    Blood Glucose Monitoring Suppl (Contour Next One) AILYN USE AS DIRECTED 3 TIMES A DAY (Patient taking differently: USE AS DIRECTED 3 TIMES A DAY    Once a day)    budesonide (PULMICORT) 0.5 mg/2 mL nebulizer solution TAKE 2 ML (0.5 MG TOTAL) BY NEBULIZATION TWICE A DAY RINSE MOUTH AFTER USE    bumetanide (BUMEX) 2 mg tablet Take 3 tablets (6 mg total) by mouth 2 (two) times a day    Contour Next Test test strip USE TO TEST BLOOD SUGAR 3 TIMES A DAY (Patient taking differently: USE TO TEST BLOOD SUGAR 3 TIMES A DAY    Once a day)    [employer] Lancets Thin 26G MISC USE 3 (THREE) TIMES A DAY (Patient taking differently: Once a day)    dapagliflozin (Farxiga) 10 MG tablet Take 1 tablet (10 mg total) by mouth daily Do not start before March 31, 2024.    diltiazem (CARDIZEM CD) 120 mg 24 hr capsule Take 1 capsule (120 mg total) by mouth daily    DULoxetine (CYMBALTA) 60 mg delayed release capsule Take 60 mg by mouth daily    fluticasone (FLONASE) 50 mcg/act nasal spray 1 spray into each nostril 2 (two) times a day    gabapentin (NEURONTIN) 300 mg capsule Take 300 mg by mouth daily    Insulin Glargine Solostar (Lantus SoloStar) 100 UNIT/ML SOPN Inject 0.42 mL (42 Units total) as directed daily at bedtime Inject 45 units daily at bedtime    insulin glulisine (Apidra SoloStar) 100 units/mL injection pen Inject 15 Units under the skin 3 (three) times a day with meals 15 units 3 times a day with meals    Insulin Pen Needle (BD Pen Needle Love 2nd Gen) 32G X 4 MM MISC Use daily at bedtime Use 4 new needles daily .    levalbuterol (XOPENEX) 1.25 mg/0.5 mL nebulizer solution Take 0.5 mL (1.25 mg total) by nebulization 2 (two) times a  "day    loratadine (CLARITIN) 10 mg tablet Take 1 tablet (10 mg total) by mouth daily as needed for allergies    LORazepam (ATIVAN) 0.5 mg tablet TAKE 2 TABLETS BY MOUTH AT BEDTIME AND 1 EVERY 6 HOURS AS NEEDED FOR ANXIETY    metoprolol succinate (TOPROL-XL) 100 mg 24 hr tablet Take 1 tablet (100 mg total) by mouth daily    montelukast (SINGULAIR) 10 mg tablet TAKE 1 TABLET BY MOUTH EVERYDAY AT BEDTIME    naloxone (NARCAN) 4 mg/0.1 mL nasal spray Administer 1 spray into a nostril. If breathing does not return to normal or if breathing difficulty resumes after 2-3 minutes, give another dose in the other nostril using a new spray.    nystatin (MYCOSTATIN) powder Apply topically 2 (two) times a day    omeprazole (PriLOSEC) 40 MG capsule Take 1 capsule (40 mg total) by mouth daily    potassium chloride (Klor-Con M20) 20 mEq tablet Take 2 tablets (40 mEq total) by mouth daily Do not start before March 31, 2024.    semaglutide, 0.25 or 0.5 mg/dose, (Ozempic, 0.25 or 0.5 MG/DOSE,) 2 mg/3 mL injection pen Inject 0.75 mL (0.5 mg total) under the skin every 7 days    spironolactone (ALDACTONE) 50 mg tablet Take 2 tablets (100 mg total) by mouth daily    tiotropium-olodaterol (Stiolto Respimat) 2.5-2.5 MCG/ACT inhaler Inhale 2 puffs daily    traZODone (DESYREL) 150 mg tablet TAKE 1 TABLET BY MOUTH EVERYDAY AT BEDTIME    EPINEPHrine (EPIPEN) 0.3 mg/0.3 mL SOAJ Inject 0.3 mL (0.3 mg total) into a muscle once for 1 dose    [DISCONTINUED] glycopyrrolate-formoterol (BEVESPI AEROSPHERE) 9-4.8 MCG/ACT inhaler Inhale 2 puffs 2 (two) times a day       Objective     /61 (BP Location: Right arm, Patient Position: Sitting, Cuff Size: Large)   Pulse 88   Temp (!) 96.6 °F (35.9 °C) (Tympanic)   Ht 5' 3\" (1.6 m)   Wt 122 kg (268 lb 6.4 oz)   SpO2 92%   BMI 47.54 kg/m²     Physical Exam  Ko Masters MD    "

## 2024-04-05 NOTE — TELEPHONE ENCOUNTER
Received a call from [facility], pt's fluid level per the HFMS has been up for 8 days.    Called pt, wt today is 265.4 and one week ago was 261 lbs.  She does feel abd bloating,  nauseous, and sob with exertion( unchanged).  Denies any other CHF symptoms.      Her electric was out for 2 days , so her son brought her meals those days.  They were not home made, but packaged meals( increased sodium).  She is trying to watch her salt intake. Does not add salt to food.  Omar she drinks 3 Liters daily instead of 2 Liters because she is thirsty. Advised to try ice cubes and sugar free hard candy.      4/3/24  Cr- 0.81, k-4.6,Bun-16, GFR- 81      Taking Bumex 6 mg bid              Aldactone 100 mg qd               Potassium 40 meq qd

## 2024-04-05 NOTE — TELEPHONE ENCOUNTER
Labs look great. She should take an extra 2 bumex tabs for the next 2 days and then go back to 6 BID. Thank you

## 2024-04-06 ENCOUNTER — TELEPHONE (OUTPATIENT)
Dept: OTHER | Facility: OTHER | Age: 64
End: 2024-04-06

## 2024-04-06 NOTE — TELEPHONE ENCOUNTER
Zulema called again, requesting a callback from on call  provider regarding high blood sugar. Provider  was paged via TC

## 2024-04-08 ENCOUNTER — TELEPHONE (OUTPATIENT)
Dept: ENDOCRINOLOGY | Facility: HOSPITAL | Age: 64
End: 2024-04-08

## 2024-04-08 ENCOUNTER — TELEPHONE (OUTPATIENT)
Dept: CARDIOLOGY CLINIC | Facility: CLINIC | Age: 64
End: 2024-04-08

## 2024-04-08 NOTE — TELEPHONE ENCOUNTER
Miya from [facility] called and stated the volume was still up on this pt. Looked at web site and as of yesterday her TFI was still up.    Spoke with pt, she did the extra diuretics over the weekend and is doing fine . Wt remains stable at 265 lbs and she denies any CHF symptoms.  She will call the office with rapid wt gain, CHF symptoms, or any questions and/ or concerns.    She has F/u on 4/22/24.  Do you want to see her sooner?  I can call her couple times weekly to make sure she is ok.    Please advise

## 2024-04-11 ENCOUNTER — OFFICE VISIT (OUTPATIENT)
Dept: FAMILY MEDICINE CLINIC | Facility: HOSPITAL | Age: 64
End: 2024-04-11
Payer: MEDICARE

## 2024-04-11 VITALS
TEMPERATURE: 98.4 F | HEIGHT: 63 IN | OXYGEN SATURATION: 91 % | BODY MASS INDEX: 46.67 KG/M2 | WEIGHT: 263.4 LBS | HEART RATE: 90 BPM | SYSTOLIC BLOOD PRESSURE: 102 MMHG | DIASTOLIC BLOOD PRESSURE: 68 MMHG

## 2024-04-11 DIAGNOSIS — G47.00 INSOMNIA, UNSPECIFIED TYPE: ICD-10-CM

## 2024-04-11 DIAGNOSIS — J45.51 SEVERE PERSISTENT ASTHMA WITH ACUTE EXACERBATION: ICD-10-CM

## 2024-04-11 DIAGNOSIS — J96.12 CHRONIC RESPIRATORY FAILURE WITH HYPOXIA AND HYPERCAPNIA (HCC): ICD-10-CM

## 2024-04-11 DIAGNOSIS — J44.9 COPD, SEVERE (HCC): ICD-10-CM

## 2024-04-11 DIAGNOSIS — J96.11 CHRONIC RESPIRATORY FAILURE WITH HYPOXIA AND HYPERCAPNIA (HCC): ICD-10-CM

## 2024-04-11 DIAGNOSIS — J06.9 UPPER RESPIRATORY TRACT INFECTION, UNSPECIFIED TYPE: Primary | ICD-10-CM

## 2024-04-11 PROCEDURE — G2211 COMPLEX E/M VISIT ADD ON: HCPCS | Performed by: NURSE PRACTITIONER

## 2024-04-11 PROCEDURE — 99214 OFFICE O/P EST MOD 30 MIN: CPT | Performed by: NURSE PRACTITIONER

## 2024-04-11 RX ORDER — AMOXICILLIN AND CLAVULANATE POTASSIUM 875; 125 MG/1; MG/1
1 TABLET, FILM COATED ORAL EVERY 12 HOURS SCHEDULED
Qty: 20 TABLET | Refills: 0 | Status: SHIPPED | OUTPATIENT
Start: 2024-04-11 | End: 2024-04-21

## 2024-04-11 RX ORDER — FLUTICASONE PROPIONATE 50 MCG
1 SPRAY, SUSPENSION (ML) NASAL 2 TIMES DAILY
Qty: 8 G | Refills: 0 | Status: SHIPPED | OUTPATIENT
Start: 2024-04-11

## 2024-04-11 NOTE — PROGRESS NOTES
Assessment/Plan:     Given significant comorbidities will treat with Augmentin.   I do not feel she is in exacerbation.   We discussed checking pulse ox multiple times/day and if consistently < 90 or symptoms seem to be worsening she should be seen in ER>      Diagnoses and all orders for this visit:    Upper respiratory tract infection, unspecified type  -     amoxicillin-clavulanate (AUGMENTIN) 875-125 mg per tablet; Take 1 tablet by mouth every 12 (twelve) hours for 10 days    Chronic respiratory failure with hypoxia and hypercapnia (HCC)    COPD, severe (HCC)    Severe persistent asthma with acute exacerbation  -     fluticasone (FLONASE) 50 mcg/act nasal spray; 1 spray into each nostril 2 (two) times a day          Subjective:     Patient ID: Mattie Joy is a 63 y.o. female.    5 days ago started with nasal congestion, sore throat. Symptoms are worsening. Feeling more sob. No wheezing. No fever or chills. Symptoms are getting worse. On chronic oxygen. Pulse ox has been running 92% which is her normal. Severe COPD and asthma. Chronic resp failure on 4 L O2.         Review of Systems   Constitutional:  Positive for fatigue.   HENT:  Positive for congestion, ear pain and sore throat.    Respiratory:  Positive for cough and shortness of breath. Negative for wheezing.          The following portions of the patient's history were reviewed and updated as appropriate: allergies, current medications, past family history, past medical history, past social history, past surgical history and problem list.    Objective:  Vitals:    04/11/24 1129   BP: 102/68   Pulse: 90   Temp: 98.4 °F (36.9 °C)   SpO2: 91%      Physical Exam  Vitals reviewed.   Constitutional:       Appearance: She is well-developed.   HENT:      Right Ear: Tympanic membrane and ear canal normal.      Left Ear: Tympanic membrane and ear canal normal.      Mouth/Throat:      Mouth: Mucous membranes are moist.      Pharynx: Oropharynx is clear.    Cardiovascular:      Rate and Rhythm: Normal rate and regular rhythm.      Heart sounds: Normal heart sounds. No murmur heard.  Pulmonary:      Effort: Pulmonary effort is normal. No respiratory distress.      Breath sounds: Normal breath sounds.      Comments: Dry cough noted  On 4 L O2  Lymphadenopathy:      Cervical: No cervical adenopathy.   Skin:     General: Skin is warm and dry.   Neurological:      Mental Status: She is alert and oriented to person, place, and time.   Psychiatric:         Mood and Affect: Mood normal.         Behavior: Behavior normal.

## 2024-04-12 ENCOUNTER — TELEPHONE (OUTPATIENT)
Dept: FAMILY MEDICINE CLINIC | Facility: HOSPITAL | Age: 64
End: 2024-04-12

## 2024-04-12 ENCOUNTER — TELEPHONE (OUTPATIENT)
Dept: ENDOCRINOLOGY | Facility: HOSPITAL | Age: 64
End: 2024-04-12

## 2024-04-12 ENCOUNTER — TELEPHONE (OUTPATIENT)
Age: 64
End: 2024-04-12

## 2024-04-12 RX ORDER — TRAZODONE HYDROCHLORIDE 150 MG/1
150 TABLET ORAL
Qty: 90 TABLET | Refills: 0 | Status: SHIPPED | OUTPATIENT
Start: 2024-04-12

## 2024-04-12 NOTE — TELEPHONE ENCOUNTER
Sample ready. Zak called the pod and asked if it was ready. I advised Osvaldo from the pod that it was ready to picked up.

## 2024-04-12 NOTE — TELEPHONE ENCOUNTER
Patient saw Zulema yesterday    Today her home nurse recommended she do a home Covid test.    She came back positive for Covid.     She did 2 tests just to be extra sure.     Symptoms are about the same as yesterday.     She is currently taking   amoxicillin-clavulanate (AUGMENTIN)     Please advise? She would like a call back today

## 2024-04-12 NOTE — TELEPHONE ENCOUNTER
PA for Apidra     Submitted via    []CMM-KEY    [x][facility]-Case ID # [id number]   []Faxed to plan   []Other website    []Phone call Case ID #      Office notes sent, clinical questions answered. Awaiting determination    Turnaround time for your insurance to make a decision on your Prior Authorization can take 7-21 business days.

## 2024-04-12 NOTE — TELEPHONE ENCOUNTER
Pt says she checked with pharmacy and Apidra needs a prior auth. Pt says she only has one shot left.     Spoke with Aleida in [facility]. She said they have alternative samples available. She will speak to provider for approval and call patient back. Pt said she can probably have someone  for her today.

## 2024-04-12 NOTE — TELEPHONE ENCOUNTER
Patient is waiting on prior auth for Apirdra. Can we provide her a sample of Lyumjev to hold her over?

## 2024-04-15 ENCOUNTER — TELEPHONE (OUTPATIENT)
Dept: FAMILY MEDICINE CLINIC | Facility: HOSPITAL | Age: 64
End: 2024-04-15

## 2024-04-15 ENCOUNTER — TELEPHONE (OUTPATIENT)
Age: 64
End: 2024-04-15

## 2024-04-15 DIAGNOSIS — J45.51 SEVERE PERSISTENT ASTHMA WITH ACUTE EXACERBATION: ICD-10-CM

## 2024-04-15 NOTE — TELEPHONE ENCOUNTER
Juanita from [facility] called with a continuation alert.  Patient continues to have elevated TFI through the weekend.     Problem: Perioperative Period (Adult)  Intervention: Promote Effective Elimination    07/19/17 1143   Manage Acute Burn Pain   Bowel Intervention ambulation promoted;adequate fluid intake promoted   Genitourinary () Interventions   Urinary Elimination Promotion frequent voiding encouraged;toileting offered;voiding relaxation promoted

## 2024-04-15 NOTE — TELEPHONE ENCOUNTER
Pt states she needs a refill for montelukast (SINGULAIR) 10 mg tablet sent to [facility]

## 2024-04-16 ENCOUNTER — HOSPITAL ENCOUNTER (OUTPATIENT)
Dept: INFUSION CENTER | Facility: HOSPITAL | Age: 64
Discharge: HOME | End: 2024-04-16
Attending: INTERNAL MEDICINE
Payer: MEDICARE

## 2024-04-16 VITALS
SYSTOLIC BLOOD PRESSURE: 96 MMHG | DIASTOLIC BLOOD PRESSURE: 54 MMHG | TEMPERATURE: 96.5 F | HEART RATE: 91 BPM | OXYGEN SATURATION: 95 %

## 2024-04-16 DIAGNOSIS — J82.83 EOSINOPHILIC ASTHMA: Primary | ICD-10-CM

## 2024-04-16 PROCEDURE — 96372 THER/PROPH/DIAG INJ SC/IM: CPT

## 2024-04-16 RX ORDER — EPINEPHRINE 1 MG/ML
0.3 INJECTION, SOLUTION, CONCENTRATE INTRAVENOUS
Status: DISCONTINUED | OUTPATIENT
Start: 2024-04-16 | End: 2024-04-19 | Stop reason: HOSPADM

## 2024-04-16 RX ORDER — EPINEPHRINE 1 MG/ML
0.3 INJECTION, SOLUTION, CONCENTRATE INTRAVENOUS
OUTPATIENT
Start: 2024-05-14

## 2024-04-16 RX ADMIN — BENRALIZUMAB 30 MG: 30 INJECTION, SOLUTION SUBCUTANEOUS at 13:58

## 2024-04-16 NOTE — PROGRESS NOTES
..Mattie Joy  tolerated treatment well with no complications, valid epi pen at side.    Mattie Joy is aware of future appt on 6/11 at 1:00.     AVS printed and given to Mattie Joy:  Yes

## 2024-04-17 ENCOUNTER — TELEPHONE (OUTPATIENT)
Age: 64
End: 2024-04-17

## 2024-04-17 RX ORDER — MONTELUKAST SODIUM 10 MG/1
10 TABLET ORAL
Qty: 90 TABLET | Refills: 3 | Status: SHIPPED | OUTPATIENT
Start: 2024-04-17

## 2024-04-17 NOTE — TELEPHONE ENCOUNTER
PA for Apidra solostar    Submitted via    []CMM-KEY   [x][facility]-Case ID # : [id number]  []Faxed to plan   []Other website   []Phone call Case ID #     Office notes sent, clinical questions answered. Awaiting determination    Turnaround time for your insurance to make a decision on your Prior Authorization can take 7-21 business days.

## 2024-04-18 ENCOUNTER — TELEPHONE (OUTPATIENT)
Dept: ENDOCRINOLOGY | Facility: HOSPITAL | Age: 64
End: 2024-04-18

## 2024-04-18 DIAGNOSIS — E11.22 TYPE 2 DIABETES MELLITUS WITH STAGE 2 CHRONIC KIDNEY DISEASE, WITH LONG-TERM CURRENT USE OF INSULIN (HCC): Primary | ICD-10-CM

## 2024-04-18 DIAGNOSIS — Z79.4 TYPE 2 DIABETES MELLITUS WITH STAGE 2 CHRONIC KIDNEY DISEASE, WITH LONG-TERM CURRENT USE OF INSULIN (HCC): Primary | ICD-10-CM

## 2024-04-18 DIAGNOSIS — N18.2 TYPE 2 DIABETES MELLITUS WITH STAGE 2 CHRONIC KIDNEY DISEASE, WITH LONG-TERM CURRENT USE OF INSULIN (HCC): Primary | ICD-10-CM

## 2024-04-18 RX ORDER — INSULIN LISPRO-AABC 100 [IU]/ML
15 INJECTION, SOLUTION SUBCUTANEOUS
Qty: 30 ML | Refills: 3 | Status: SHIPPED | OUTPATIENT
Start: 2024-04-18

## 2024-04-18 NOTE — TELEPHONE ENCOUNTER
Shaye from [facility] PA dept calling she needs acall back for additional questions before 1 PM today her call back is [phone] and ref # is [id number].

## 2024-04-19 ENCOUNTER — NURSE TRIAGE (OUTPATIENT)
Age: 64
End: 2024-04-19

## 2024-04-19 NOTE — TELEPHONE ENCOUNTER
PA for Apidra solostar  Denied    Reason:(Screenshot if applicable)        Message sent to office clinical pool Yes    Denial letter scanned into Media Yes    Appeal started No ( Provider will need to decide if appeal is warranted and send clinical documentation to PA team for initiation.)

## 2024-04-19 NOTE — TELEPHONE ENCOUNTER
"Aleida from [facility] called stating Tues, Thursday and today SBP's have been <100.   Recently this week 94/73, 93/71  HR averaging   The past few months SBP have been 106-108  She denies any lightheadedness/dizziness except when she lies in bed @ night she will feel some dizziness.   Weight is stable 260.8  Med list reconciled w/ [facility] and pt is taking all meds as directed  Pt has a f/u appt on 5/2/24  Aleida tona/peña# [phone]      Reason for Disposition   Systolic BP  while taking blood pressure medications and NOT dizzy, lightheaded or weak    Answer Assessment - Initial Assessment Questions  1. BLOOD PRESSURE: \"What is the blood pressure?\" \"Did you take at least two measurements 5 minutes apart?\"      93/71, 94/73  2. ONSET: \"When did you take your blood pressure?\"      Past few days  3. HOW: \"How did you obtain the blood pressure?\" (e.g., visiting nurse, automatic home BP monitor)      Automatic home cuff and VNA  4. HISTORY: \"Do you have a history of low blood pressure?\" \"What is your blood pressure normally?\"      Normally -108  5. MEDICATIONS: \"Are you taking any medications for blood pressure?\" If Yes, ask: \"Have they been changed recently?\"      See charty  6. PULSE RATE: \"Do you know what your pulse rate is?\"       Averages   7. OTHER SYMPTOMS: \"Have you been sick recently?\" \"Have you had a recent injury?\"      no    Protocols used: Blood Pressure - Low-ADULT-OH    "

## 2024-04-19 NOTE — TELEPHONE ENCOUNTER
We don't really have room to reduce her meds so if asymptomatic I would leave unchanged       Called, spoke to home care. Message relayed as given.

## 2024-04-24 ENCOUNTER — TELEPHONE (OUTPATIENT)
Dept: CARDIOLOGY CLINIC | Facility: CLINIC | Age: 64
End: 2024-04-24

## 2024-04-24 NOTE — TELEPHONE ENCOUNTER
Got 2 alerts for this pt over the last 2 weeks.   TFI up last 4 days this week and for 6 days last week Hr below 100.  Spoke with pt and she is doing good. Wt is down to 258 lbs. Denies any CHF symptoms.    F/u on 5/2/24 with you.    Thank you

## 2024-04-26 DIAGNOSIS — J44.1 COPD WITH ACUTE EXACERBATION (HCC): ICD-10-CM

## 2024-04-26 DIAGNOSIS — I50.9 ACUTE EXACERBATION OF CHF (CONGESTIVE HEART FAILURE) (HCC): ICD-10-CM

## 2024-04-26 DIAGNOSIS — I50.33 ACUTE ON CHRONIC DIASTOLIC HEART FAILURE (HCC): ICD-10-CM

## 2024-04-26 DIAGNOSIS — I48.91 ATRIAL FIBRILLATION WITH RAPID VENTRICULAR RESPONSE (HCC): ICD-10-CM

## 2024-04-26 NOTE — TELEPHONE ENCOUNTER
Patient needs refills on her scripts, but she also has some questions  regarding her scripts.  Could you please return her call @ [phone].

## 2024-04-29 ENCOUNTER — OFFICE VISIT (OUTPATIENT)
Dept: FAMILY MEDICINE CLINIC | Facility: HOSPITAL | Age: 64
End: 2024-04-29
Payer: MEDICARE

## 2024-04-29 ENCOUNTER — TELEPHONE (OUTPATIENT)
Dept: CARDIOLOGY CLINIC | Facility: CLINIC | Age: 64
End: 2024-04-29

## 2024-04-29 VITALS
BODY MASS INDEX: 45.18 KG/M2 | OXYGEN SATURATION: 90 % | TEMPERATURE: 97.5 F | WEIGHT: 255 LBS | SYSTOLIC BLOOD PRESSURE: 88 MMHG | HEIGHT: 63 IN | HEART RATE: 79 BPM | DIASTOLIC BLOOD PRESSURE: 54 MMHG

## 2024-04-29 DIAGNOSIS — I13.0 HYPERTENSIVE HEART AND CHRONIC KIDNEY DISEASE WITH HEART FAILURE AND STAGE 1 THROUGH STAGE 4 CHRONIC KIDNEY DISEASE, OR CHRONIC KIDNEY DISEASE (HCC): ICD-10-CM

## 2024-04-29 DIAGNOSIS — E11.22 TYPE 2 DIABETES MELLITUS WITH STAGE 2 CHRONIC KIDNEY DISEASE, WITH LONG-TERM CURRENT USE OF INSULIN (HCC): ICD-10-CM

## 2024-04-29 DIAGNOSIS — I50.32 CHRONIC DIASTOLIC CHF (CONGESTIVE HEART FAILURE) (HCC): Primary | ICD-10-CM

## 2024-04-29 DIAGNOSIS — E28.39 MENOPAUSE OVARIAN FAILURE: ICD-10-CM

## 2024-04-29 DIAGNOSIS — Z79.899 OTHER LONG TERM (CURRENT) DRUG THERAPY: ICD-10-CM

## 2024-04-29 DIAGNOSIS — Z79.4 TYPE 2 DIABETES MELLITUS WITH STAGE 2 CHRONIC KIDNEY DISEASE, WITH LONG-TERM CURRENT USE OF INSULIN (HCC): ICD-10-CM

## 2024-04-29 DIAGNOSIS — N18.2 TYPE 2 DIABETES MELLITUS WITH STAGE 2 CHRONIC KIDNEY DISEASE, WITH LONG-TERM CURRENT USE OF INSULIN (HCC): ICD-10-CM

## 2024-04-29 DIAGNOSIS — Z12.31 ENCOUNTER FOR SCREENING MAMMOGRAM FOR MALIGNANT NEOPLASM OF BREAST: ICD-10-CM

## 2024-04-29 PROCEDURE — G0402 INITIAL PREVENTIVE EXAM: HCPCS | Performed by: FAMILY MEDICINE

## 2024-04-29 PROCEDURE — 99214 OFFICE O/P EST MOD 30 MIN: CPT | Performed by: FAMILY MEDICINE

## 2024-04-29 RX ORDER — SPIRONOLACTONE 50 MG/1
100 TABLET, FILM COATED ORAL DAILY
Qty: 180 TABLET | Refills: 1 | Status: SHIPPED | OUTPATIENT
Start: 2024-04-29

## 2024-04-29 RX ORDER — DILTIAZEM HYDROCHLORIDE 120 MG/1
120 CAPSULE, COATED, EXTENDED RELEASE ORAL DAILY
Qty: 90 CAPSULE | Refills: 1 | Status: SHIPPED | OUTPATIENT
Start: 2024-04-29

## 2024-04-29 RX ORDER — BUMETANIDE 2 MG/1
6 TABLET ORAL 2 TIMES DAILY
Qty: 540 TABLET | Refills: 1 | Status: SHIPPED | OUTPATIENT
Start: 2024-04-29

## 2024-04-29 NOTE — PROGRESS NOTES
Assessment and Plan:     Problem List Items Addressed This Visit        Cardiovascular and Mediastinum    Chronic diastolic CHF (congestive heart failure) (HCC) - Primary     Wt Readings from Last 3 Encounters:   04/29/24 116 kg (255 lb)   04/11/24 119 kg (263 lb 6.4 oz)   04/05/24 122 kg (268 lb 6.4 oz)       CHF.   With low BP and some lightheadedness.   Has had a significan decrease in her weight.   She is more adherent to fluid restriction and salt intake.     Will lower bumex to 4 mg BID.   Monitor weight daily.   Bmp next week.              Relevant Orders    Basic metabolic panel    Vitamin D 25 hydroxy    Hypertensive heart and chronic kidney disease with heart failure and stage 1 through stage 4 chronic kidney disease, or chronic kidney disease (HCC)    Relevant Orders    Vitamin D 25 hydroxy       Endocrine    Type 2 diabetes mellitus with stage 2 chronic kidney disease, with long-term current use of insulin ([facility])       Lab Results   Component Value Date    HGBA1C 8.5 (H) 01/22/2024     Work on deitary control.   Last A1c at 8.5.   No med changes today         Relevant Orders    Vitamin D 25 hydroxy   Other Visit Diagnoses     Encounter for screening mammogram for malignant neoplasm of breast        Relevant Orders    Mammo screening bilateral w 3d & cad    Menopause ovarian failure        Relevant Orders    DXA bone density spine hip and pelvis    Other long term (current) drug therapy        Relevant Orders    Vitamin D 25 hydroxy             Preventive health issues were discussed with patient, and age appropriate screening tests were ordered as noted in patient's After Visit Summary.  Personalized health advice and appropriate referrals for health education or preventive services given if needed, as noted in patient's After Visit Summary.     History of Present Illness:     Patient presents for a Medicare Wellness Visit    Here for awv.   Lately she has had weakness and lightheadedness.   She has been  taking her medications regularly.   She has been more careful with her salt intake and fluid restriction. Currently on bumex 6 mg bid.   She is down to 255 today. Her last visit she was at 266.            Patient Care Team:  Laith Olivares MD as PCP - General  Ko Masters MD as PCP - PCP-[facility] ([facility])  Petrona Taveras MD as PCP - Endocrinology (Endocrinology)  Ko Masters MD as PCP - PCP-[facility] ([facility])  MD Itzel Woodard DO Andrea Thrush, CRNP Kristofer Matullo, MD Janae Wames, RD as Diabetes Educator (Dietician)  Lobito Alfredo PA-C (Endocrinology)     Review of Systems:     Review of Systems   Constitutional: Negative.  Negative for activity change, appetite change, chills, diaphoresis, fatigue and fever.   HENT:  Negative for congestion, facial swelling and sore throat.    Respiratory: Negative.  Negative for apnea, cough, chest tightness and shortness of breath.    Cardiovascular: Negative.  Negative for chest pain and palpitations.   Gastrointestinal: Negative.  Negative for abdominal distention, abdominal pain, blood in stool, constipation, diarrhea and nausea.   Genitourinary: Negative.  Negative for difficulty urinating, dysuria, flank pain and frequency.        Problem List:     Patient Active Problem List   Diagnosis   • Ganglion cyst of flexor tendon sheath   • Hypertensive heart and chronic kidney disease with heart failure and stage 1 through stage 4 chronic kidney disease, or chronic kidney disease (HCC)   • Esophageal reflux   • Obstructive sleep apnea   • Cervical radiculopathy   • Chronic low back pain   • Hypercholesterolemia   • Type 2 diabetes mellitus with stage 2 chronic kidney disease, with long-term current use of insulin (HCC)   • Abnormal computed tomography angiography (CTA) of abdomen   • Abnormal CT of the abdomen   • Iron deficiency anemia due to chronic blood loss   • Hypokalemia   • Elevated liver enzymes   •  Lumbar radiculopathy   • Paresthesia of upper extremity   • Sexual dysfunction   • Paroxysmal atrial fibrillation with RVR   • Severe persistent asthma   • Current moderate episode of major depressive disorder without prior episode (HCC)   • Neck pain, chronic   • Chronic diastolic CHF (congestive heart failure) (HCC)   • Microcytic anemia   • Lactic acidosis   • Abnormal CT of the chest   • Insomnia   • Chronic respiratory failure with hypoxia and hypercapnia (HCC)   • Pulmonary hypertension (HCC)   • Class 3 severe obesity in adult (HCC)   • Gastric polyp   • Transaminitis   • Tubular adenoma of colon   • Type 2 diabetes mellitus with hyperglycemia (HCC)   • Diabetic polyneuropathy associated with type 2 diabetes mellitus (HCC)   • Morbid obesity (HCC)   • Hepatic steatosis   • Eosinophilic asthma   • COPD, severe (HCC)      Past Medical and Surgical History:     Past Medical History:   Diagnosis Date   • Acute blood loss anemia 06/01/2021   • Acute diverticulitis 05/02/2021   • Acute on chronic diastolic congestive heart failure (HCC) 05/21/2020   • Acute on chronic respiratory failure with hypoxia (HCC) 11/30/2018   • Alcohol abuse 05/21/2020   • Anemia    • Asthma with COPD with exacerbation  (HCC) 11/12/2020   • Atrial fibrillation (HCC)    • Cervical radiculopathy    • CHF (congestive heart failure) (HCC)    • Chronic low back pain    • Chronic obstructive asthma 02/20/2018   • Cigarette nicotine dependence without complication 11/20/2019    Quit 12/10/2019.    • Community acquired pneumonia 05/21/2020   • COPD exacerbation (HCC) 09/16/2020   • Depression with anxiety 03/09/2014   • Diabetes mellitus with hyperglycemia (HCC)    • Diverticulitis 06/06/2021   • Elevated liver enzymes    • GERD (gastroesophageal reflux disease)    • Hypersomnolence 10/28/2020   • Hypokalemia 12/04/2018   • Lower gastrointestinal bleeding 06/01/2021   • Paresthesia of upper extremity    • Plantar fasciitis    • Restless legs  syndrome 2014   • Severe persistent asthma with exacerbation 2018    PFTs 2018:  FEV1 0.87 L-35% predicted, 27% improvement post-bronchodilator.  DLCO-82% predicted,  The patient has been having worsening of her symptoms now for few months, especially  from 2020, also she had multiple exacerbations last year and most recently required a steroid burst and August  Reviewing her CT scan  New PFTs with severe obstruction reviewed  Hypersensitivity p   • Sexual dysfunction    • Sleep apnea, obstructive    • Tenosynovitis of finger    • Tinea corporis    • Tobacco use 2018    Currently smoking 3-4 cigarettes daily   • Trigger middle finger of left hand 2017   • Trigger ring finger of left hand 2017   • Weakness of upper extremity      Past Surgical History:   Procedure Laterality Date   • ABDOMINAL SURGERY     • CARPAL TUNNEL RELEASE Bilateral    •  SECTION     • CHOLECYSTECTOMY      laparoscopic   • ESOPHAGOGASTRODUODENOSCOPY N/A 12/3/2018    Procedure: ESOPHAGOGASTRODUODENOSCOPY (EGD) AND COLONOSCOPY;  Surgeon: Ludmila Perry MD;  Location: [facility];  Service: Gastroenterology   • GASTRIC BYPASS      for morbid obesity with gilda en y   • HERNIA REPAIR     • HYSTERECTOMY     • GA EXCISION GANGLION WRIST DORSAL/VOLAR PRIMARY Left 2017    Procedure: LONG FINGER GANGLION CYST EXCISION;  Surgeon: Philippe Clark MD;  Location: [facility];  Service: Orthopedics   • GA TENDON SHEATH INCISION Left 2017    Procedure: THUMB, LONG, RING, TRIGGER FINGER RELEASE;  Surgeon: Philippe Clark MD;  Location: [facility];  Service: Orthopedics   • SHOULDER SURGERY Right       Family History:     Family History   Problem Relation Age of Onset   • Alzheimer's disease Mother    • Atrial fibrillation Mother    • Dementia Mother    • Heart disease Mother         heart problem   • Seizures Mother    • Parkinsonism Father    • Arthritis Father    • Atrial fibrillation  Father    • No Known Problems Daughter    • Cri-du-chat syndrome Daughter    • Colon cancer Maternal Grandmother 80   • Diabetes type II Maternal Grandmother    • No Known Problems Brother    • No Known Problems Son    • Substance Abuse Neg Hx         mother,father   • Mental illness Neg Hx         mother,father   • Colon polyps Neg Hx       Social History:     Social History     Socioeconomic History   • Marital status: Significant Other     Spouse name: None   • Number of children: None   • Years of education: None   • Highest education level: None   Occupational History   • None   Tobacco Use   • Smoking status: Former     Current packs/day: 0.00     Average packs/day: 0.3 packs/day for 29.9 years (7.5 ttl pk-yrs)     Types: Cigarettes     Start date:      Quit date: 12/10/2019     Years since quittin.3   • Smokeless tobacco: Never   Vaping Use   • Vaping status: Never Used   Substance and Sexual Activity   • Alcohol use: Not Currently     Alcohol/week: 20.0 standard drinks of alcohol     Types: 20 Cans of beer per week     Comment: about 6 coors light every night, stopped in 2019   • Drug use: No   • Sexual activity: Not Currently   Other Topics Concern   • None   Social History Narrative   • None     Social Determinants of Health     Financial Resource Strain: Not on file   Food Insecurity: No Food Insecurity (2024)    Hunger Vital Sign    • Worried About Running Out of Food in the Last Year: Never true    • Ran Out of Food in the Last Year: Never true   Transportation Needs: No Transportation Needs (2024)    PRAPARE - Transportation    • Lack of Transportation (Medical): No    • Lack of Transportation (Non-Medical): No   Physical Activity: Not on file   Stress: Not on file   Social Connections: Not on file   Intimate Partner Violence: Not on file   Housing Stability: Low Risk  (2024)    Housing Stability Vital Sign    • Unable to Pay for Housing in the Last Year: No    • Number of Places  Lived in the Last Year: 1    • Unstable Housing in the Last Year: No      Medications and Allergies:     Current Outpatient Medications   Medication Sig Dispense Refill   • albuterol (2.5 mg/3 mL) 0.083 % nebulizer solution Take 3 mL (2.5 mg total) by nebulization every 6 (six) hours as needed for wheezing or shortness of breath 1080 mL 3   • albuterol (PROVENTIL HFA,VENTOLIN HFA) 90 mcg/act inhaler Inhale 2 puffs every 4 (four) hours as needed for wheezing 8.5 g 5   • apixaban (Eliquis) 5 mg Take 1 tablet (5 mg total) by mouth 2 (two) times a day 60 tablet 2   • atorvastatin (LIPITOR) 40 mg tablet Take 1 tablet (40 mg total) by mouth daily 90 tablet 3   • Blood Glucose Monitoring Suppl (Contour Next One) AILYN USE AS DIRECTED 3 TIMES A DAY (Patient taking differently: USE AS DIRECTED 3 TIMES A DAY    Once a day) 1 each 0   • budesonide (PULMICORT) 0.5 mg/2 mL nebulizer solution TAKE 2 ML (0.5 MG TOTAL) BY NEBULIZATION TWICE A DAY RINSE MOUTH AFTER USE 60 mL 3   • bumetanide (BUMEX) 2 mg tablet Take 3 tablets (6 mg total) by mouth 2 (two) times a day 540 tablet 1   • Contour Next Test test strip USE TO TEST BLOOD SUGAR 3 TIMES A DAY (Patient taking differently: USE TO TEST BLOOD SUGAR 3 TIMES A DAY    Once a day) 100 strip 3   • [employer] Lancets Thin 26G MISC USE 3 (THREE) TIMES A DAY (Patient taking differently: Once a day) 100 each 5   • dapagliflozin (Farxiga) 10 MG tablet Take 1 tablet (10 mg total) by mouth daily Do not start before March 31, 2024.     • diltiazem (CARDIZEM CD) 120 mg 24 hr capsule Take 1 capsule (120 mg total) by mouth daily 90 capsule 1   • DULoxetine (CYMBALTA) 60 mg delayed release capsule Take 60 mg by mouth daily     • fluticasone (FLONASE) 50 mcg/act nasal spray 1 spray into each nostril 2 (two) times a day 8 g 0   • gabapentin (NEURONTIN) 300 mg capsule Take 300 mg by mouth daily     • Insulin Glargine Solostar (Lantus SoloStar) 100 UNIT/ML SOPN Inject 0.42 mL (42 Units total) as directed  daily at bedtime Inject 45 units daily at bedtime 15 mL 6   • insulin glulisine (Apidra SoloStar) 100 units/mL injection pen Inject 15 Units under the skin 3 (three) times a day with meals 15 units 3 times a day with meals 15 mL 6   • Insulin Lispro-aabc, 1 U Dial, (Lyumjev KwikPen) 100 UNIT/ML SOPN Inject 15 Units under the skin 3 (three) times a day before meals 30 mL 3   • Insulin Pen Needle (BD Pen Needle Love 2nd Gen) 32G X 4 MM MISC Use daily at bedtime Use 4 new needles daily . 400 each 3   • levalbuterol (XOPENEX) 1.25 mg/0.5 mL nebulizer solution Take 0.5 mL (1.25 mg total) by nebulization 2 (two) times a day 60 vial 0   • loratadine (CLARITIN) 10 mg tablet Take 1 tablet (10 mg total) by mouth daily as needed for allergies 30 tablet 0   • LORazepam (ATIVAN) 0.5 mg tablet TAKE 2 TABLETS BY MOUTH AT BEDTIME AND 1 EVERY 6 HOURS AS NEEDED FOR ANXIETY 90 tablet 0   • metoprolol succinate (TOPROL-XL) 100 mg 24 hr tablet Take 1 tablet (100 mg total) by mouth daily 90 tablet 3   • montelukast (SINGULAIR) 10 mg tablet Take 1 tablet (10 mg total) by mouth daily at bedtime 90 tablet 3   • naloxone (NARCAN) 4 mg/0.1 mL nasal spray Administer 1 spray into a nostril. If breathing does not return to normal or if breathing difficulty resumes after 2-3 minutes, give another dose in the other nostril using a new spray. 1 each 1   • nystatin (MYCOSTATIN) powder Apply topically 2 (two) times a day 30 g 0   • omeprazole (PriLOSEC) 40 MG capsule Take 1 capsule (40 mg total) by mouth daily 90 capsule 3   • potassium chloride (Klor-Con M20) 20 mEq tablet Take 2 tablets (40 mEq total) by mouth daily Do not start before March 31, 2024.     • semaglutide, 0.25 or 0.5 mg/dose, (Ozempic, 0.25 or 0.5 MG/DOSE,) 2 mg/3 mL injection pen Inject 0.75 mL (0.5 mg total) under the skin every 7 days 9 mL 1   • spironolactone (ALDACTONE) 50 mg tablet Take 2 tablets (100 mg total) by mouth daily 180 tablet 1   • tiotropium-olodaterol (Stiolto  Respimat) 2.5-2.5 MCG/ACT inhaler Inhale 2 puffs daily 12 g 5   • traZODone (DESYREL) 150 mg tablet Take 1 tablet (150 mg total) by mouth daily at bedtime 90 tablet 0   • EPINEPHrine (EPIPEN) 0.3 mg/0.3 mL SOAJ Inject 0.3 mL (0.3 mg total) into a muscle once for 1 dose 0.6 mL 0     No current facility-administered medications for this visit.     Allergies   Allergen Reactions   • Iron Dextran Swelling   • Januvia [Sitagliptin] Shortness Of Breath   • Jardiance [Empagliflozin] Shortness Of Breath   • Clonazepam Other (See Comments)     Unknown reaction   • Codeine Itching and Other (See Comments)     itch;mary kay morphine,takes ultram @home   • Adhesive [Medical Tape] Itching     itching   • Effexor [Venlafaxine] Itching   • Lactose - Food Allergy GI Intolerance   • Oxycodone-Acetaminophen Anxiety   • Oxycodone-Acetaminophen Itching and Rash     Other reaction(s): Other (See Comments)  (PERCOCET) MILD RASH, not allergic to Acetaminophen       Immunizations:     Immunization History   Administered Date(s) Administered   • COVID-19 PFIZER VACCINE 0.3 ML IM 03/23/2021, 03/23/2021, 04/13/2021, 04/13/2021, 11/06/2021   • Hep B, adult 09/09/2004, 10/07/2004, 03/22/2005   • INFLUENZA 12/06/2018, 10/21/2020   • Influenza Quadrivalent, 6-35 Months IM 09/14/2015, 09/20/2017   • Influenza, recombinant, quadrivalent,injectable, preservative free 12/06/2018, 10/18/2019, 09/03/2021, 01/09/2023, 09/13/2023   • Influenza, seasonal, injectable 10/15/2014   • Pneumococcal Conjugate 13-Valent 12/06/2018   • Pneumococcal Conjugate Vaccine 20-valent (Pcv20), Polysace 11/09/2023   • Pneumococcal Polysaccharide PPV23 11/04/2020   • Tdap 09/13/2023      Health Maintenance:         Topic Date Due   • HIV Screening  Never done   • Breast Cancer Screening: Mammogram  11/01/2023   • Colorectal Cancer Screening  08/03/2026   • Hepatitis C Screening  Completed   • Lung Cancer Screening  Discontinued         Topic Date Due   • COVID-19 Vaccine (6 -  2023-24 season) 09/01/2023      Medicare Screening Tests and Risk Assessments:     Mattie is here for her Subsequent Wellness visit.     Health Risk Assessment:   Patient rates overall health as good. Patient feels that their physical health rating is same. Patient is satisfied with their life. Eyesight was rated as same. Hearing was rated as same. Patient feels that their emotional and mental health rating is same. Patients states they are never, rarely angry. Patient states they are often unusually tired/fatigued. Pain experienced in the last 7 days has been none. Patient states that she has experienced no weight loss or gain in last 6 months.     Depression Screening:   PHQ-9 Score: 9      Fall Risk Screening:   In the past year, patient has experienced: no history of falling in past year      Urinary Incontinence Screening:   Patient has leaked urine accidently in the last six months.     Home Safety:  Patient has trouble with stairs inside or outside of their home. Patient has working smoke alarms and has working carbon monoxide detector. Home safety hazards include: none.     Nutrition:   Current diet is Diabetic.     Medications:   Patient is currently taking over-the-counter supplements. OTC medications include: see medication list. Patient is able to manage medications.     Activities of Daily Living (ADLs)/Instrumental Activities of Daily Living (IADLs):   Walk and transfer into and out of bed and chair?: Yes  Dress and groom yourself?: Yes    Bathe or shower yourself?: No    Feed yourself? Yes  Do your laundry/housekeeping?: No  Manage your money, pay your bills and track your expenses?: Yes  Make your own meals?: Yes    Do your own shopping?: No    ADL comments: Patient has help come in with some ADLs     Previous Hospitalizations:   Any hospitalizations or ED visits within the last 12 months?: Yes    How many hospitalizations have you had in the last year?: 3-4    Advance Care Planning:   Living will:  "No    Durable POA for healthcare: No    Advanced directive: No      PREVENTIVE SCREENINGS      Cardiovascular Screening:    General: Screening Not Indicated and History Lipid Disorder      Diabetes Screening:     General: Screening Not Indicated and History Diabetes      Colorectal Cancer Screening:     General: Screening Current      Breast Cancer Screening:     General: Screening Current      Cervical Cancer Screening:    General: Screening Not Indicated      Lung Cancer Screening:     General: Screening Not Indicated      Hepatitis C Screening:    General: Screening Current    Screening, Brief Intervention, and Referral to Treatment (SBIRT)    Screening      Single Item Drug Screening:  How often have you used an illegal drug (including marijuana) or a prescription medication for non-medical reasons in the past year? never    Single Item Drug Screen Score: 0  Interpretation: Negative screen for possible drug use disorder    No results found.     Physical Exam:     BP (!) 88/54   Pulse 79   Temp 97.5 °F (36.4 °C)   Ht 5' 3\" (1.6 m)   Wt 116 kg (255 lb)   SpO2 90% Comment: 4LO2  BMI 45.17 kg/m²     Physical Exam  Vitals and nursing note reviewed.   Constitutional:       General: She is not in acute distress.     Appearance: Normal appearance. She is well-developed.   HENT:      Head: Normocephalic and atraumatic.      Right Ear: External ear normal.      Left Ear: External ear normal.      Nose: Nose normal.      Mouth/Throat:      Mouth: Mucous membranes are moist.   Eyes:      Conjunctiva/sclera: Conjunctivae normal.      Pupils: Pupils are equal, round, and reactive to light.   Cardiovascular:      Rate and Rhythm: Normal rate and regular rhythm.      Heart sounds: Normal heart sounds.   Pulmonary:      Effort: Pulmonary effort is normal.      Breath sounds: Normal breath sounds.   Abdominal:      General: Bowel sounds are normal.      Palpations: Abdomen is soft.   Musculoskeletal:      Cervical back: " Normal range of motion and neck supple.      Right lower leg: No edema.      Left lower leg: No edema.   Skin:     General: Skin is warm and dry.   Neurological:      General: No focal deficit present.      Mental Status: She is alert and oriented to person, place, and time.   Psychiatric:         Mood and Affect: Mood normal.         Behavior: Behavior normal.          Laith Olivares MD    Depression Screening Follow-up Plan: Patient's depression screening was positive with a PHQ-2 score of . Their PHQ-9 score was 9. Patient assessed for underlying major depression. They have no active suicidal ideations. Brief counseling provided and recommend additional follow-up/re-evaluation next office visit.

## 2024-04-29 NOTE — TELEPHONE ENCOUNTER
F/U call to patient due to HFMS alert for over the weekend.    Patient's fluid volume is back to normal but her HR is elevated to 110 to 130.    Stated she was experiencing papillations this AM but feels her HR coming down. Has experienced palpitations in the past.    During the night last night, while walking to the bathroom, she stated she had a dizzy spell where she felt she was going to faint & held on to the sink. She did not pass out.    Has been feeling tired & SOB this morning when getting dressed.\    Today's Wt - 254.8 lbs. States she is losing weight.    Has an OV with her PCP today & OV with you on 5/2.    Current Cardiac Medications:  Diltiazem 120 mg Daily.  Bumex 6 mg BID.  Spironolactone 100 mg daily.  Eliquis 5 mg BID.  Farxiga 10 mg daily.  Toprol- mg daily.    Please advise.

## 2024-04-29 NOTE — PATIENT INSTRUCTIONS
Medicare Preventive Visit Patient Instructions  Thank you for completing your Welcome to Medicare Visit or Medicare Annual Wellness Visit today. Your next wellness visit will be due in one year (4/30/2025).  The screening/preventive services that you may require over the next 5-10 years are detailed below. Some tests may not apply to you based off risk factors and/or age. Screening tests ordered at today's visit but not completed yet may show as past due. Also, please note that scanned in results may not display below.  Preventive Screenings:  Service Recommendations Previous Testing/Comments   Colorectal Cancer Screening  * Colonoscopy    * Fecal Occult Blood Test (FOBT)/Fecal Immunochemical Test (FIT)  * Fecal DNA/Cologuard Test  * Flexible Sigmoidoscopy Age: 45-75 years old   Colonoscopy: every 10 years (may be performed more frequently if at higher risk)  OR  FOBT/FIT: every 1 year  OR  Cologuard: every 3 years  OR  Sigmoidoscopy: every 5 years  Screening may be recommended earlier than age 45 if at higher risk for colorectal cancer. Also, an individualized decision between you and your healthcare provider will decide whether screening between the ages of 76-85 would be appropriate. Colonoscopy: 08/04/2021  FOBT/FIT: Not on file  Cologuard: Not on file  Sigmoidoscopy: Not on file    Screening Current     Breast Cancer Screening Age: 40+ years old  Frequency: every 1-2 years  Not required if history of left and right mastectomy Mammogram: 11/01/2022    Screening Current   Cervical Cancer Screening Between the ages of 21-29, pap smear recommended once every 3 years.   Between the ages of 30-65, can perform pap smear with HPV co-testing every 5 years.   Recommendations may differ for women with a history of total hysterectomy, cervical cancer, or abnormal pap smears in past. Pap Smear: 08/30/2016    Screening Not Indicated   Hepatitis C Screening Once for adults born between 1945 and 1965  More frequently in  patients at high risk for Hepatitis C Hep C Antibody: 06/02/2021    Screening Current   Diabetes Screening 1-2 times per year if you're at risk for diabetes or have pre-diabetes Fasting glucose: 373 mg/dL (2/10/2022)  A1C: 8.5 % (1/22/2024)  Screening Not Indicated  History Diabetes   Cholesterol Screening Once every 5 years if you don't have a lipid disorder. May order more often based on risk factors. Lipid panel: 12/28/2023    Screening Not Indicated  History Lipid Disorder     Other Preventive Screenings Covered by Medicare:  Abdominal Aortic Aneurysm (AAA) Screening: covered once if your at risk. You're considered to be at risk if you have a family history of AAA.  Lung Cancer Screening: covers low dose CT scan once per year if you meet all of the following conditions: (1) Age 55-77; (2) No signs or symptoms of lung cancer; (3) Current smoker or have quit smoking within the last 15 years; (4) You have a tobacco smoking history of at least 20 pack years (packs per day multiplied by number of years you smoked); (5) You get a written order from a healthcare provider.  Glaucoma Screening: covered annually if you're considered high risk: (1) You have diabetes OR (2) Family history of glaucoma OR (3)  aged 50 and older OR (4)  American aged 65 and older  Osteoporosis Screening: covered every 2 years if you meet one of the following conditions: (1) You're estrogen deficient and at risk for osteoporosis based off medical history and other findings; (2) Have a vertebral abnormality; (3) On glucocorticoid therapy for more than 3 months; (4) Have primary hyperparathyroidism; (5) On osteoporosis medications and need to assess response to drug therapy.   Last bone density test (DXA Scan): Not on file.  HIV Screening: covered annually if you're between the age of 15-65. Also covered annually if you are younger than 15 and older than 65 with risk factors for HIV infection. For pregnant patients, it is  covered up to 3 times per pregnancy.    Immunizations:  Immunization Recommendations   Influenza Vaccine Annual influenza vaccination during flu season is recommended for all persons aged >= 6 months who do not have contraindications   Pneumococcal Vaccine   * Pneumococcal conjugate vaccine = PCV13 (Prevnar 13), PCV15 (Vaxneuvance), PCV20 (Prevnar 20)  * Pneumococcal polysaccharide vaccine = PPSV23 (Pneumovax) Adults 19-63 yo with certain risk factors or if 65+ yo  If never received any pneumonia vaccine: recommend Prevnar 20 (PCV20)  Give PCV20 if previously received 1 dose of PCV13 or PPSV23   Hepatitis B Vaccine 3 dose series if at intermediate or high risk (ex: diabetes, end stage renal disease, liver disease)   Respiratory syncytial virus (RSV) Vaccine - COVERED BY MEDICARE PART D  * RSVPreF3 (Arexvy) CDC recommends that adults 60 years of age and older may receive a single dose of RSV vaccine using shared clinical decision-making (SCDM)   Tetanus (Td) Vaccine - COST NOT COVERED BY MEDICARE PART B Following completion of primary series, a booster dose should be given every 10 years to maintain immunity against tetanus. Td may also be given as tetanus wound prophylaxis.   Tdap Vaccine - COST NOT COVERED BY MEDICARE PART B Recommended at least once for all adults. For pregnant patients, recommended with each pregnancy.   Shingles Vaccine (Shingrix) - COST NOT COVERED BY MEDICARE PART B  2 shot series recommended in those 19 years and older who have or will have weakened immune systems or those 50 years and older     Health Maintenance Due:      Topic Date Due   • HIV Screening  Never done   • Breast Cancer Screening: Mammogram  11/01/2023   • Colorectal Cancer Screening  08/03/2026   • Hepatitis C Screening  Completed   • Lung Cancer Screening  Discontinued     Immunizations Due:      Topic Date Due   • COVID-19 Vaccine (6 - 2023-24 season) 09/01/2023     Advance Directives   What are advance directives?  Advance  directives are legal documents that state your wishes and plans for medical care. These plans are made ahead of time in case you lose your ability to make decisions for yourself. Advance directives can apply to any medical decision, such as the treatments you want, and if you want to donate organs.   What are the types of advance directives?  There are many types of advance directives, and each state has rules about how to use them. You may choose a combination of any of the following:  Living will:  This is a written record of the treatment you want. You can also choose which treatments you do not want, which to limit, and which to stop at a certain time. This includes surgery, medicine, IV fluid, and tube feedings.   Durable power of  for healthcare (DPAHC):  This is a written record that states who you want to make healthcare choices for you when you are unable to make them for yourself. This person, called a proxy, is usually a family member or a friend. You may choose more than 1 proxy.  Do not resuscitate (DNR) order:  A DNR order is used in case your heart stops beating or you stop breathing. It is a request not to have certain forms of treatment, such as CPR. A DNR order may be included in other types of advance directives.  Medical directive:  This covers the care that you want if you are in a coma, near death, or unable to make decisions for yourself. You can list the treatments you want for each condition. Treatment may include pain medicine, surgery, blood transfusions, dialysis, IV or tube feedings, and a ventilator (breathing machine).  Values history:  This document has questions about your views, beliefs, and how you feel and think about life. This information can help others choose the care that you would choose.  Why are advance directives important?  An advance directive helps you control your care. Although spoken wishes may be used, it is better to have your wishes written down. Spoken  wishes can be misunderstood, or not followed. Treatments may be given even if you do not want them. An advance directive may make it easier for your family to make difficult choices about your care.   Urinary Incontinence   Urinary incontinence (UI)  is when you lose control of your bladder. UI develops because your bladder cannot store or empty urine properly. The 3 most common types of UI are stress incontinence, urge incontinence, or both.  Medicines:   May be given to help strengthen your bladder control. Report any side effects of medication to your healthcare provider.  Do pelvic muscle exercises often:  Your pelvic muscles help you stop urinating. Squeeze these muscles tight for 5 seconds, then relax for 5 seconds. Gradually work up to squeezing for 10 seconds. Do 3 sets of 15 repetitions a day, or as directed. This will help strengthen your pelvic muscles and improve bladder control.  Train your bladder:  Go to the bathroom at set times, such as every 2 hours, even if you do not feel the urge to go. You can also try to hold your urine when you feel the urge to go. For example, hold your urine for 5 minutes when you feel the urge to go. As that becomes easier, hold your urine for 10 minutes.   Self-care:   Keep a UI record.  Write down how often you leak urine and how much you leak. Make a note of what you were doing when you leaked urine.  Drink liquids as directed. You may need to limit the amount of liquid you drink to help control your urine leakage. Do not drink any liquid right before you go to bed. Limit or do not have drinks that contain caffeine or alcohol.   Prevent constipation.  Eat a variety of high-fiber foods. Good examples are high-fiber cereals, beans, vegetables, and whole-grain breads. Walking is the best way to trigger your intestines to have a bowel movement.  Exercise regularly and maintain a healthy weight.  Weight loss and exercise will decrease pressure on your bladder and help you  control your leakage.   Use a catheter as directed  to help empty your bladder. A catheter is a tiny, plastic tube that is put into your bladder to drain your urine.   Go to behavior therapy as directed.  Behavior therapy may be used to help you learn to control your urge to urinate.    Weight Management   Why it is important to manage your weight:  Being overweight increases your risk of health conditions such as heart disease, high blood pressure, type 2 diabetes, and certain types of cancer. It can also increase your risk for osteoarthritis, sleep apnea, and other respiratory problems. Aim for a slow, steady weight loss. Even a small amount of weight loss can lower your risk of health problems.  How to lose weight safely:  A safe and healthy way to lose weight is to eat fewer calories and get regular exercise. You can lose up about 1 pound a week by decreasing the number of calories you eat by 500 calories each day.   Healthy meal plan for weight management:  A healthy meal plan includes a variety of foods, contains fewer calories, and helps you stay healthy. A healthy meal plan includes the following:  Eat whole-grain foods more often.  A healthy meal plan should contain fiber. Fiber is the part of grains, fruits, and vegetables that is not broken down by your body. Whole-grain foods are healthy and provide extra fiber in your diet. Some examples of whole-grain foods are whole-wheat breads and pastas, oatmeal, brown rice, and bulgur.  Eat a variety of vegetables every day.  Include dark, leafy greens such as spinach, kale, dale greens, and mustard greens. Eat yellow and orange vegetables such as carrots, sweet potatoes, and winter squash.   Eat a variety of fruits every day.  Choose fresh or canned fruit (canned in its own juice or light syrup) instead of juice. Fruit juice has very little or no fiber.  Eat low-fat dairy foods.  Drink fat-free (skim) milk or 1% milk. Eat fat-free yogurt and low-fat cottage  cheese. Try low-fat cheeses such as mozzarella and other reduced-fat cheeses.  Choose meat and other protein foods that are low in fat.  Choose beans or other legumes such as split peas or lentils. Choose fish, skinless poultry (chicken or turkey), or lean cuts of red meat (beef or pork). Before you cook meat or poultry, cut off any visible fat.   Use less fat and oil.  Try baking foods instead of frying them. Add less fat, such as margarine, sour cream, regular salad dressing and mayonnaise to foods. Eat fewer high-fat foods. Some examples of high-fat foods include french fries, doughnuts, ice cream, and cakes.  Eat fewer sweets.  Limit foods and drinks that are high in sugar. This includes candy, cookies, regular soda, and sweetened drinks.  Exercise:  Exercise at least 30 minutes per day on most days of the week. Some examples of exercise include walking, biking, dancing, and swimming. You can also fit in more physical activity by taking the stairs instead of the elevator or parking farther away from stores. Ask your healthcare provider about the best exercise plan for you.      © Copyright [employer] 2018 Information is for End User's use only and may not be sold, redistributed or otherwise used for commercial purposes. All illustrations and images included in CareNotes® are the copyrighted property of [employer] Inc[employer] or [employer]

## 2024-04-29 NOTE — TELEPHONE ENCOUNTER
Returned call to patient who stated @ her OV w/PCP today, her BP - 88/54.   Wt - 255 lb.    Stated her PCP decreased her Bumex to 2 - 2 mg (total 4 mg) BID.    Patient is not prescribed Metolazone PRN.    Patient has CARD OV 5/2.    Thank you.

## 2024-04-29 NOTE — PROGRESS NOTES
Notified by Lenny regarding HFMS alert over the weekend. Called and left VM with patient. Instructed her to call back for further instructions. Awaiting call back.

## 2024-04-30 ENCOUNTER — TELEPHONE (OUTPATIENT)
Age: 64
End: 2024-04-30

## 2024-04-30 NOTE — TELEPHONE ENCOUNTER
Steffi called from [facility] clarifying instructions for Bumex 2 mg.  Patient states the instructions were changed during appointment yesterday to 2 tablets two times a day.  Patient stated her BP was low yesterday at appointment and Dr. Olivares changed the instructions.  Tseffi stated she does have a follow up appointment with cardiology tomorrow.  Also, her BP this morning was 105/77.    Please advise    Thank you    Steffi can be reached at [phone]

## 2024-05-01 ENCOUNTER — TELEPHONE (OUTPATIENT)
Dept: CARDIOLOGY CLINIC | Facility: CLINIC | Age: 64
End: 2024-05-01

## 2024-05-01 NOTE — TELEPHONE ENCOUNTER
Spoke with vicky, they had another alert on Mattie.  She has been doing great.  Are you ok if we have Zoll change her TFI to were she has been falling as far as it stating she is over index?    Then we would not get the alerts if she keeps falling in that path.  It seems like the patch was placed when she was drier and under her fluid norm.    Please advise

## 2024-05-01 NOTE — ASSESSMENT & PLAN NOTE
Wt Readings from Last 3 Encounters:   04/29/24 116 kg (255 lb)   04/11/24 119 kg (263 lb 6.4 oz)   04/05/24 122 kg (268 lb 6.4 oz)       CHF.   With low BP and some lightheadedness.   Has had a significan decrease in her weight.   She is more adherent to fluid restriction and salt intake.     Will lower bumex to 4 mg BID.   Monitor weight daily.   Bmp next week.

## 2024-05-01 NOTE — ASSESSMENT & PLAN NOTE
Lab Results   Component Value Date    HGBA1C 8.5 (H) 01/22/2024     Work on deitary control.   Last A1c at 8.5.   No med changes today

## 2024-05-01 NOTE — TELEPHONE ENCOUNTER
Did talk with Alicia today at [facility] and they will send another patch out for pt and that will adjust the fluid index.    Spoke with pt and she is aware she will get a new patch to replace the one she has on.  Verbally understood.

## 2024-05-02 ENCOUNTER — OFFICE VISIT (OUTPATIENT)
Dept: CARDIOLOGY CLINIC | Facility: CLINIC | Age: 64
End: 2024-05-02
Payer: MEDICARE

## 2024-05-02 VITALS
HEART RATE: 81 BPM | SYSTOLIC BLOOD PRESSURE: 100 MMHG | BODY MASS INDEX: 45.15 KG/M2 | WEIGHT: 254.8 LBS | HEIGHT: 63 IN | DIASTOLIC BLOOD PRESSURE: 60 MMHG

## 2024-05-02 DIAGNOSIS — I50.32 CHRONIC DIASTOLIC HEART FAILURE (HCC): Primary | ICD-10-CM

## 2024-05-02 PROCEDURE — 99214 OFFICE O/P EST MOD 30 MIN: CPT | Performed by: PHYSICIAN ASSISTANT

## 2024-05-02 RX ORDER — SENNOSIDES 8.6 MG
650 CAPSULE ORAL EVERY 8 HOURS PRN
COMMUNITY

## 2024-05-02 NOTE — PROGRESS NOTES
"Cardiology Office Follow Up  Mattie Joy  1960  [id number]      ASSESSMENT:  Chronic diastolic heart failure  8/15/2023 echo: LVEF 65%, grade 2 diastolic dysfunction, severely elevated RV systolic pressure 61 mmHg  Current diuretic: Bumex 6 BID, Aldactone 100 QD, potassium 40 QD  Baseline wt around 255 lbs  Persistent atrial fibrillation  Thromboembolic PPx: Eliquis 5 twice daily  Rate control: Toprol- mg QD, Diltiazem 120 QD  COPD   Type 2 diabetes  Obesity, BMI 51  Pulmonary hypertension likely group 2/3 from left heart disease, underlying lung disease, OHV    PLAN/ DISCUSSION:  Euvolemic today with weight stable in mid 250's  Doing excellent carefully watching sodium intake  Heart rate is well-controlled on metoprolol and diltiazem  Anticoagulated with Eliquis for atrial fibrillation  Repeat BMP pending  Continue Bumex 6 mg twice daily.  She did try decreasing this for low blood pressure but immediately began to retain fluid so was back up to 6 mg twice daily.  Okay to continue this dose and tolerate some soft blood pressure from our standpoint  Follow-up in 3 months    Interval History/ HPI:   63-year-old female returns to the clinic for a 1 month follow-up.  Today she reports feeling well.  Breathing is stable and at baseline.  No chest pain or chest pressure.  No swelling in the legs.  She remains on Bumex 6 twice daily.  She did try decreasing this as her blood pressure is on the lower side but she says she immediately began to retain fluid and gain weight.  She then went back to 6 mg twice daily and has been feeling stable.     Vitals:  /60 (BP Location: Left arm, Patient Position: Sitting, Cuff Size: Standard)   Pulse 81   Ht 5' 3\" (1.6 m)   Wt 116 kg (254 lb 12.8 oz)   BMI 45.14 kg/m²      Past Medical History:   Diagnosis Date    Acute blood loss anemia 06/01/2021    Acute diverticulitis 05/02/2021    Acute on chronic diastolic congestive heart failure (HCC) 05/21/2020    Acute on " chronic respiratory failure with hypoxia (HCC) 11/30/2018    Alcohol abuse 05/21/2020    Anemia     Asthma with COPD with exacerbation  (HCC) 11/12/2020    Atrial fibrillation (HCC)     Cervical radiculopathy     CHF (congestive heart failure) ([facility])     Chronic low back pain     Chronic obstructive asthma 02/20/2018    Cigarette nicotine dependence without complication 11/20/2019    Quit 12/10/2019.     Community acquired pneumonia 05/21/2020    COPD exacerbation ([facility]) 09/16/2020    Depression with anxiety 03/09/2014    Diabetes mellitus with hyperglycemia ([facility])     Diverticulitis 06/06/2021    Elevated liver enzymes     GERD (gastroesophageal reflux disease)     Hypersomnolence 10/28/2020    Hypokalemia 12/04/2018    Lower gastrointestinal bleeding 06/01/2021    Paresthesia of upper extremity     Plantar fasciitis     Restless legs syndrome 04/08/2014    Severe persistent asthma with exacerbation 02/20/2018    PFTs February 8, 2018:  FEV1 0.87 L-35% predicted, 27% improvement post-bronchodilator.  DLCO-82% predicted,  The patient has been having worsening of her symptoms now for few months, especially  from January 2020, also she had multiple exacerbations last year and most recently required a steroid burst and August  Reviewing her CT scan  New PFTs with severe obstruction reviewed  Hypersensitivity p    Sexual dysfunction     Sleep apnea, obstructive     Tenosynovitis of finger     Tinea corporis     Tobacco use 02/20/2018    Currently smoking 3-4 cigarettes daily    Trigger middle finger of left hand 12/14/2017    Trigger ring finger of left hand 12/14/2017    Weakness of upper extremity      Social History     Socioeconomic History    Marital status: Significant Other     Spouse name: Not on file    Number of children: Not on file    Years of education: Not on file    Highest education level: Not on file   Occupational History    Not on file   Tobacco Use    Smoking status: Former     Current packs/day: 0.00      Average packs/day: 0.3 packs/day for 29.9 years (7.5 ttl pk-yrs)     Types: Cigarettes     Start date:      Quit date: 12/10/2019     Years since quittin.3    Smokeless tobacco: Never   Vaping Use    Vaping status: Never Used   Substance and Sexual Activity    Alcohol use: Not Currently     Alcohol/week: 20.0 standard drinks of alcohol     Types: 20 Cans of beer per week     Comment: about 6 coors light every night, stopped in 2019    Drug use: No    Sexual activity: Not Currently   Other Topics Concern    Not on file   Social History Narrative    Not on file     Social Determinants of Health     Financial Resource Strain: Not on file   Food Insecurity: No Food Insecurity (2024)    Hunger Vital Sign     Worried About Running Out of Food in the Last Year: Never true     Ran Out of Food in the Last Year: Never true   Transportation Needs: No Transportation Needs (2024)    PRAPARE - Transportation     Lack of Transportation (Medical): No     Lack of Transportation (Non-Medical): No   Physical Activity: Not on file   Stress: Not on file   Social Connections: Not on file   Intimate Partner Violence: Not on file   Housing Stability: Low Risk  (2024)    Housing Stability Vital Sign     Unable to Pay for Housing in the Last Year: No     Number of Places Lived in the Last Year: 1     Unstable Housing in the Last Year: No      Family History   Problem Relation Age of Onset    Alzheimer's disease Mother     Atrial fibrillation Mother     Dementia Mother     Heart disease Mother         heart problem    Seizures Mother     Parkinsonism Father     Arthritis Father     Atrial fibrillation Father     No Known Problems Daughter     Cri-du-chat syndrome Daughter     Colon cancer Maternal Grandmother 80    Diabetes type II Maternal Grandmother     No Known Problems Brother     No Known Problems Son     Substance Abuse Neg Hx         mother,father    Mental illness Neg Hx         mother,father    Colon  polyps Neg Hx      Past Surgical History:   Procedure Laterality Date    ABDOMINAL SURGERY      CARPAL TUNNEL RELEASE Bilateral      SECTION      CHOLECYSTECTOMY      laparoscopic    ESOPHAGOGASTRODUODENOSCOPY N/A 12/3/2018    Procedure: ESOPHAGOGASTRODUODENOSCOPY (EGD) AND COLONOSCOPY;  Surgeon: Ludmila Perry MD;  Location: [facility];  Service: Gastroenterology    GASTRIC BYPASS      for morbid obesity with gilda en y    HERNIA REPAIR      HYSTERECTOMY      CT EXCISION GANGLION WRIST DORSAL/VOLAR PRIMARY Left 2017    Procedure: LONG FINGER GANGLION CYST EXCISION;  Surgeon: Philpipe Clark MD;  Location: [facility];  Service: Orthopedics    CT TENDON SHEATH INCISION Left 2017    Procedure: THUMB, LONG, RING, TRIGGER FINGER RELEASE;  Surgeon: Philippe Clark MD;  Location: [facility];  Service: Orthopedics    SHOULDER SURGERY Right        Current Outpatient Medications:     albuterol (2.5 mg/3 mL) 0.083 % nebulizer solution, Take 3 mL (2.5 mg total) by nebulization every 6 (six) hours as needed for wheezing or shortness of breath, Disp: 1080 mL, Rfl: 3    albuterol (PROVENTIL HFA,VENTOLIN HFA) 90 mcg/act inhaler, Inhale 2 puffs every 4 (four) hours as needed for wheezing, Disp: 8.5 g, Rfl: 5    apixaban (Eliquis) 5 mg, Take 1 tablet (5 mg total) by mouth 2 (two) times a day, Disp: 60 tablet, Rfl: 2    atorvastatin (LIPITOR) 40 mg tablet, Take 1 tablet (40 mg total) by mouth daily, Disp: 90 tablet, Rfl: 3    Blood Glucose Monitoring Suppl (Contour Next One) AILYN, USE AS DIRECTED 3 TIMES A DAY (Patient taking differently: USE AS DIRECTED 3 TIMES A DAY  Once a day), Disp: 1 each, Rfl: 0    budesonide (PULMICORT) 0.5 mg/2 mL nebulizer solution, TAKE 2 ML (0.5 MG TOTAL) BY NEBULIZATION TWICE A DAY RINSE MOUTH AFTER USE, Disp: 60 mL, Rfl: 3    bumetanide (BUMEX) 2 mg tablet, Take 3 tablets (6 mg total) by mouth 2 (two) times a day, Disp: 540 tablet, Rfl: 1    Contour Next Test test strip, USE TO  TEST BLOOD SUGAR 3 TIMES A DAY (Patient taking differently: USE TO TEST BLOOD SUGAR 3 TIMES A DAY  Once a day), Disp: 100 strip, Rfl: 3    [employer] Lancets Thin 26G MISC, USE 3 (THREE) TIMES A DAY (Patient taking differently: Once a day), Disp: 100 each, Rfl: 5    dapagliflozin (Farxiga) 10 MG tablet, Take 1 tablet (10 mg total) by mouth daily Do not start before March 31, 2024., Disp: , Rfl:     diltiazem (CARDIZEM CD) 120 mg 24 hr capsule, Take 1 capsule (120 mg total) by mouth daily, Disp: 90 capsule, Rfl: 1    DULoxetine (CYMBALTA) 60 mg delayed release capsule, Take 60 mg by mouth daily, Disp: , Rfl:     EPINEPHrine (EPIPEN) 0.3 mg/0.3 mL SOAJ, Inject 0.3 mL (0.3 mg total) into a muscle once for 1 dose, Disp: 0.6 mL, Rfl: 0    fluticasone (FLONASE) 50 mcg/act nasal spray, 1 spray into each nostril 2 (two) times a day, Disp: 8 g, Rfl: 0    gabapentin (NEURONTIN) 300 mg capsule, Take 300 mg by mouth daily, Disp: , Rfl:     Insulin Glargine Solostar (Lantus SoloStar) 100 UNIT/ML SOPN, Inject 0.42 mL (42 Units total) as directed daily at bedtime Inject 45 units daily at bedtime, Disp: 15 mL, Rfl: 6    insulin glulisine (Apidra SoloStar) 100 units/mL injection pen, Inject 15 Units under the skin 3 (three) times a day with meals 15 units 3 times a day with meals, Disp: 15 mL, Rfl: 6    Insulin Lispro-aabc, 1 U Dial, (Lyumjev KwikPen) 100 UNIT/ML SOPN, Inject 15 Units under the skin 3 (three) times a day before meals, Disp: 30 mL, Rfl: 3    Insulin Pen Needle (BD Pen Needle Love 2nd Gen) 32G X 4 MM MISC, Use daily at bedtime Use 4 new needles daily ., Disp: 400 each, Rfl: 3    levalbuterol (XOPENEX) 1.25 mg/0.5 mL nebulizer solution, Take 0.5 mL (1.25 mg total) by nebulization 2 (two) times a day, Disp: 60 vial, Rfl: 0    loratadine (CLARITIN) 10 mg tablet, Take 1 tablet (10 mg total) by mouth daily as needed for allergies, Disp: 30 tablet, Rfl: 0    LORazepam (ATIVAN) 0.5 mg tablet, TAKE 2 TABLETS BY MOUTH AT BEDTIME AND  1 EVERY 6 HOURS AS NEEDED FOR ANXIETY, Disp: 90 tablet, Rfl: 0    metoprolol succinate (TOPROL-XL) 100 mg 24 hr tablet, Take 1 tablet (100 mg total) by mouth daily, Disp: 90 tablet, Rfl: 3    montelukast (SINGULAIR) 10 mg tablet, Take 1 tablet (10 mg total) by mouth daily at bedtime, Disp: 90 tablet, Rfl: 3    naloxone (NARCAN) 4 mg/0.1 mL nasal spray, Administer 1 spray into a nostril. If breathing does not return to normal or if breathing difficulty resumes after 2-3 minutes, give another dose in the other nostril using a new spray., Disp: 1 each, Rfl: 1    nystatin (MYCOSTATIN) powder, Apply topically 2 (two) times a day, Disp: 30 g, Rfl: 0    omeprazole (PriLOSEC) 40 MG capsule, Take 1 capsule (40 mg total) by mouth daily, Disp: 90 capsule, Rfl: 3    potassium chloride (Klor-Con M20) 20 mEq tablet, Take 2 tablets (40 mEq total) by mouth daily Do not start before March 31, 2024., Disp: , Rfl:     semaglutide, 0.25 or 0.5 mg/dose, (Ozempic, 0.25 or 0.5 MG/DOSE,) 2 mg/3 mL injection pen, Inject 0.75 mL (0.5 mg total) under the skin every 7 days, Disp: 9 mL, Rfl: 1    spironolactone (ALDACTONE) 50 mg tablet, Take 2 tablets (100 mg total) by mouth daily, Disp: 180 tablet, Rfl: 1    tiotropium-olodaterol (Stiolto Respimat) 2.5-2.5 MCG/ACT inhaler, Inhale 2 puffs daily, Disp: 12 g, Rfl: 5    traZODone (DESYREL) 150 mg tablet, Take 1 tablet (150 mg total) by mouth daily at bedtime, Disp: 90 tablet, Rfl: 0      Review of Systems:  Review of Systems   Constitutional:  Negative for chills and fever.   HENT:  Negative for ear pain and sore throat.    Eyes:  Negative for pain and visual disturbance.   Respiratory:  Negative for cough and shortness of breath.    Cardiovascular:  Negative for chest pain and palpitations.   Gastrointestinal:  Negative for abdominal pain and vomiting.   Genitourinary:  Negative for dysuria and hematuria.   Musculoskeletal:  Negative for arthralgias and back pain.   Skin:  Negative for color  change and rash.   Neurological:  Negative for seizures and syncope.   All other systems reviewed and are negative.        Physical Exam:  Physical Exam  Constitutional:       General: She is not in acute distress.     Appearance: Normal appearance. She is not ill-appearing.   HENT:      Head: Normocephalic and atraumatic.      Right Ear: External ear normal.      Left Ear: External ear normal.      Mouth/Throat:      Pharynx: No oropharyngeal exudate or posterior oropharyngeal erythema.   Eyes:      General:         Right eye: No discharge.         Left eye: No discharge.      Conjunctiva/sclera: Conjunctivae normal.      Pupils: Pupils are equal, round, and reactive to light.   Cardiovascular:      Rate and Rhythm: Normal rate. Rhythm irregular.      Pulses: Normal pulses.      Heart sounds: Normal heart sounds. No murmur heard.     No friction rub. No gallop.   Pulmonary:      Effort: Pulmonary effort is normal.      Breath sounds: Normal breath sounds. No wheezing, rhonchi or rales.   Abdominal:      General: Abdomen is flat. There is no distension.      Palpations: Abdomen is soft.      Tenderness: There is no abdominal tenderness.   Musculoskeletal:         General: No swelling, tenderness or deformity. Normal range of motion.      Cervical back: Normal range of motion.   Skin:     General: Skin is warm and dry.   Neurological:      General: No focal deficit present.      Mental Status: She is alert and oriented to person, place, and time.         This note was completed in part utilizing M-Modal Fluency Direct Software.  Grammatical errors, random word insertions, spelling mistakes, and incomplete sentences can be an occasional consequence of this system secondary to software limitations, ambient noise, and hardware issues.  If you have any questions or concerns about the content, text, or information contained within the body of this dictation, please contact the provider for clarification.

## 2024-05-03 ENCOUNTER — TELEPHONE (OUTPATIENT)
Age: 64
End: 2024-05-03

## 2024-05-03 NOTE — TELEPHONE ENCOUNTER
Alexa from [facility] calling  They can't do the labs we have to she said for this patient.    Please call patient about the mobile lab info..  She wants at home labs done.    [phone] to request a mobile lab visit today.

## 2024-05-06 DIAGNOSIS — G47.00 INSOMNIA, UNSPECIFIED TYPE: ICD-10-CM

## 2024-05-06 RX ORDER — TRAZODONE HYDROCHLORIDE 150 MG/1
150 TABLET ORAL
Qty: 30 TABLET | Refills: 5 | Status: SHIPPED | OUTPATIENT
Start: 2024-05-06

## 2024-05-06 NOTE — TELEPHONE ENCOUNTER
Reason for call:   [x] Refill   [] Prior Auth  [] Other:     Office:   [x] PCP/Provider - Laith Olivares MD   [] Specialty/Provider -     Medication: traZODone (DESYREL) 150 mg tablet     Dose/Frequency: Take 1 tablet (150 mg total) by mouth daily at bedtime     Quantity: 90    [place] [facility] [address]     Does the patient have enough for 3 days?   [] Yes   [x] No - Send as HP to POD

## 2024-05-06 NOTE — TELEPHONE ENCOUNTER
Received call from friend regarding labs that were orders. She would like to know if the home labs were ordered. Friend who is on communication sheet would like a call back with date and time that they will be arriving at [phone]. She states that it needs to be done this week. If she doesn't answer she requests that message be left on her answering machine.

## 2024-05-08 NOTE — TELEPHONE ENCOUNTER
Patient friend Kamryn requesting update on lab order for patient to have them drawn at home either for Thursday or Friday.    Please review and advice.  Thank You.

## 2024-05-10 ENCOUNTER — TELEPHONE (OUTPATIENT)
Age: 64
End: 2024-05-10

## 2024-05-10 NOTE — TELEPHONE ENCOUNTER
Patient friend Kamryn called requesting update on lab orders for patient to be drawn a home today.    Warm transfer to office.

## 2024-05-13 ENCOUNTER — TELEPHONE (OUTPATIENT)
Age: 64
End: 2024-05-13

## 2024-05-13 NOTE — TELEPHONE ENCOUNTER
Patient called to say that she is getting her labs drawn in about an hour and needs her lab orders faxed to [phone]

## 2024-05-14 ENCOUNTER — TELEPHONE (OUTPATIENT)
Dept: CARDIOLOGY CLINIC | Facility: CLINIC | Age: 64
End: 2024-05-14

## 2024-05-14 DIAGNOSIS — I13.0 HYPERTENSIVE HEART AND CHRONIC KIDNEY DISEASE WITH HEART FAILURE AND STAGE 1 THROUGH STAGE 4 CHRONIC KIDNEY DISEASE, OR CHRONIC KIDNEY DISEASE (HCC): Primary | ICD-10-CM

## 2024-05-14 LAB
25(OH)D3+25(OH)D2 SERPL-MCNC: 15.1 NG/ML (ref 30–100)
BUN SERPL-MCNC: 24 MG/DL (ref 8–27)
BUN/CREAT SERPL: 23 (ref 12–28)
CALCIUM SERPL-MCNC: 9.7 MG/DL (ref 8.7–10.3)
CHLORIDE SERPL-SCNC: 85 MMOL/L (ref 96–106)
CO2 SERPL-SCNC: 27 MMOL/L (ref 20–29)
CREAT SERPL-MCNC: 1.06 MG/DL (ref 0.57–1)
EGFR: 58 ML/MIN/1.73
GLUCOSE SERPL-MCNC: 140 MG/DL (ref 70–99)
POTASSIUM SERPL-SCNC: 5.6 MMOL/L (ref 3.5–5.2)
SODIUM SERPL-SCNC: 130 MMOL/L (ref 134–144)

## 2024-05-14 NOTE — TELEPHONE ENCOUNTER
Called spoke to pt, relayed message as given, pt verbalized understanding.     Labs have been faxed to [facility] at [phone]

## 2024-05-14 NOTE — TELEPHONE ENCOUNTER
"Pts family friend called regarding lab results. Told pts friend Chuck Harrell PA-C message \"Good morning could you please try to reach out to Ivy and ask her to stop the potassium supplements? Her potassium levels are elevated. She can stop the supplement and then repeat blood work next week. I will order repeat labs.\"     Pts friend was wondering if appointment tomorrow was neccessary. Told her I could certainly find out but pts friend decided to keep appointment.     Pts friend wanted to make note its very hard for pt to go out for lab work d/t having to drive herself. Pts friend is asking if this is something the office can take care of by putting the labs in so that someone comes to the house every time she needs lab work done?     Please call pts friend back regarding this matter         "

## 2024-05-14 NOTE — TELEPHONE ENCOUNTER
----- Message from Chuck Harrell PA-C sent at 5/14/2024  7:58 AM EDT -----  Good morning could you please try to reach out to Ivy and ask her to stop the potassium supplements? Her potassium levels are elevated. She can stop the supplement and then repeat blood work next week. I will order repeat labs.     If there are any issues just let me know. Thanks!  Michael

## 2024-05-15 ENCOUNTER — OFFICE VISIT (OUTPATIENT)
Dept: FAMILY MEDICINE CLINIC | Facility: HOSPITAL | Age: 64
End: 2024-05-15
Payer: MEDICARE

## 2024-05-15 VITALS
SYSTOLIC BLOOD PRESSURE: 100 MMHG | TEMPERATURE: 97.4 F | HEART RATE: 108 BPM | DIASTOLIC BLOOD PRESSURE: 70 MMHG | OXYGEN SATURATION: 92 % | HEIGHT: 63 IN | WEIGHT: 255.6 LBS | BODY MASS INDEX: 45.29 KG/M2

## 2024-05-15 DIAGNOSIS — Z79.4 TYPE 2 DIABETES MELLITUS WITH HYPERGLYCEMIA, WITH LONG-TERM CURRENT USE OF INSULIN (HCC): ICD-10-CM

## 2024-05-15 DIAGNOSIS — I48.91 ATRIAL FIBRILLATION WITH RAPID VENTRICULAR RESPONSE (HCC): ICD-10-CM

## 2024-05-15 DIAGNOSIS — I13.0 HYPERTENSIVE HEART AND CHRONIC KIDNEY DISEASE WITH HEART FAILURE AND STAGE 1 THROUGH STAGE 4 CHRONIC KIDNEY DISEASE, OR CHRONIC KIDNEY DISEASE (HCC): ICD-10-CM

## 2024-05-15 DIAGNOSIS — E11.65 TYPE 2 DIABETES MELLITUS WITH HYPERGLYCEMIA, WITH LONG-TERM CURRENT USE OF INSULIN (HCC): ICD-10-CM

## 2024-05-15 DIAGNOSIS — R79.89 LOW VITAMIN D LEVEL: ICD-10-CM

## 2024-05-15 DIAGNOSIS — J44.9 COPD, SEVERE (HCC): ICD-10-CM

## 2024-05-15 DIAGNOSIS — I50.32 CHRONIC DIASTOLIC CHF (CONGESTIVE HEART FAILURE) (HCC): Primary | ICD-10-CM

## 2024-05-15 PROCEDURE — 99214 OFFICE O/P EST MOD 30 MIN: CPT | Performed by: FAMILY MEDICINE

## 2024-05-15 PROCEDURE — G2211 COMPLEX E/M VISIT ADD ON: HCPCS | Performed by: FAMILY MEDICINE

## 2024-05-15 RX ORDER — MULTIVIT-MIN/IRON/FOLIC ACID/K 18-600-40
2000 CAPSULE ORAL DAILY
Qty: 90 CAPSULE | Refills: 3 | Status: SHIPPED | OUTPATIENT
Start: 2024-05-15

## 2024-05-15 NOTE — PROGRESS NOTES
Ambulatory Visit  Name: Mattie Joy      : 1960      MRN: [id number]  Encounter Provider: Laith Olivares MD  Encounter Date: 5/15/2024   Encounter department: [facility] PRIMARY CARE [address]     Assessment & Plan   1. Chronic diastolic CHF (congestive heart failure) (HCC)    Currently stable, appears euvolemic today.   Back on bumex at 6 mg twice a day.  On farxiga.   Mild hyperkalemia. Potassium adjusted and blood work to be done next week.   Fu with Cardiogloy  2. Atrial fibrillation with rapid ventricular response (HCC)    Stable.   Fu with Cardiology.   On both metoprolol and cardizem  Continues with eliquis.   3. COPD, severe (HCC)    No exacerbation.   Continue with pulmo.   No chagnes in medications.       4. Hypertensive heart and chronic kidney disease with heart failure and stage 1 through stage 4 chronic kidney disease, or chronic kidney disease (HCC)    Continue to monitor.     5. Low vitamin D level  -     Vitamin D, Cholecalciferol, 50 MCG (2000 UT) CAPS; Take 2,000 Int'l Units/day by mouth in the morning  Supplement with vitamin D 2000 IU daily.     Fu in 3 months.      History of Present Illness     Mattie is here for fu of chronic conditions.     Known CHF/CAD, dm, htn, kvng, severe copd/asthma.   She has been seeing Cardiology. Back on bumex 6 mg twice a day.   Her weight at home has been steady at 251 pounds.   Does monitor her salt and fluid intake.   Her last labs showing hyperkalemia.   Repeat labs and adjustment of potassium directed by Cardiology.     No new concerns today.         Review of Systems   Constitutional: Negative.  Negative for activity change, appetite change, chills, diaphoresis, fatigue and fever.   HENT:  Negative for congestion, facial swelling and sore throat.    Respiratory: Negative.  Negative for apnea, cough, chest tightness and shortness of breath.    Cardiovascular: Negative.  Negative for chest pain and palpitations.   Gastrointestinal:  Negative.  Negative for abdominal distention, abdominal pain, blood in stool, constipation, diarrhea and nausea.   Genitourinary: Negative.  Negative for difficulty urinating, dysuria, flank pain and frequency.     Current Outpatient Medications on File Prior to Visit   Medication Sig Dispense Refill   • acetaminophen (TYLENOL) 650 mg CR tablet Take 650 mg by mouth every 8 (eight) hours as needed for mild pain     • albuterol (2.5 mg/3 mL) 0.083 % nebulizer solution Take 3 mL (2.5 mg total) by nebulization every 6 (six) hours as needed for wheezing or shortness of breath 1080 mL 3   • albuterol (PROVENTIL HFA,VENTOLIN HFA) 90 mcg/act inhaler Inhale 2 puffs every 4 (four) hours as needed for wheezing 8.5 g 5   • apixaban (Eliquis) 5 mg Take 1 tablet (5 mg total) by mouth 2 (two) times a day 60 tablet 2   • atorvastatin (LIPITOR) 40 mg tablet Take 1 tablet (40 mg total) by mouth daily 90 tablet 3   • Blood Glucose Monitoring Suppl (Contour Next One) AILYN USE AS DIRECTED 3 TIMES A DAY (Patient taking differently: USE AS DIRECTED 3 TIMES A DAY    Once a day) 1 each 0   • budesonide (PULMICORT) 0.5 mg/2 mL nebulizer solution TAKE 2 ML (0.5 MG TOTAL) BY NEBULIZATION TWICE A DAY RINSE MOUTH AFTER USE 60 mL 3   • bumetanide (BUMEX) 2 mg tablet Take 3 tablets (6 mg total) by mouth 2 (two) times a day 540 tablet 1   • Contour Next Test test strip USE TO TEST BLOOD SUGAR 3 TIMES A DAY (Patient taking differently: USE TO TEST BLOOD SUGAR 3 TIMES A DAY    Once a day) 100 strip 3   • [employer] Lancets Thin 26G MISC USE 3 (THREE) TIMES A DAY (Patient taking differently: Once a day) 100 each 5   • dapagliflozin (Farxiga) 10 MG tablet Take 1 tablet (10 mg total) by mouth daily Do not start before March 31, 2024.     • diltiazem (CARDIZEM CD) 120 mg 24 hr capsule Take 1 capsule (120 mg total) by mouth daily 90 capsule 1   • DULoxetine (CYMBALTA) 60 mg delayed release capsule Take 60 mg by mouth daily     • fluticasone (FLONASE) 50 mcg/act  nasal spray 1 spray into each nostril 2 (two) times a day 8 g 0   • gabapentin (NEURONTIN) 300 mg capsule Take 300 mg by mouth daily Pt reports taking 300 mg TID     • Insulin Glargine Solostar (Lantus SoloStar) 100 UNIT/ML SOPN Inject 0.42 mL (42 Units total) as directed daily at bedtime Inject 45 units daily at bedtime 15 mL 6   • insulin glulisine (Apidra SoloStar) 100 units/mL injection pen Inject 15 Units under the skin 3 (three) times a day with meals 15 units 3 times a day with meals 15 mL 6   • Insulin Lispro-aabc, 1 U Dial, (Lyumjev KwikPen) 100 UNIT/ML SOPN Inject 15 Units under the skin 3 (three) times a day before meals 30 mL 3   • Insulin Pen Needle (BD Pen Needle Love 2nd Gen) 32G X 4 MM MISC Use daily at bedtime Use 4 new needles daily . 400 each 3   • levalbuterol (XOPENEX) 1.25 mg/0.5 mL nebulizer solution Take 0.5 mL (1.25 mg total) by nebulization 2 (two) times a day 60 vial 0   • loratadine (CLARITIN) 10 mg tablet Take 1 tablet (10 mg total) by mouth daily as needed for allergies 30 tablet 0   • LORazepam (ATIVAN) 0.5 mg tablet TAKE 2 TABLETS BY MOUTH AT BEDTIME AND 1 EVERY 6 HOURS AS NEEDED FOR ANXIETY 90 tablet 0   • metoprolol succinate (TOPROL-XL) 100 mg 24 hr tablet Take 1 tablet (100 mg total) by mouth daily 90 tablet 3   • montelukast (SINGULAIR) 10 mg tablet Take 1 tablet (10 mg total) by mouth daily at bedtime 90 tablet 3   • naloxone (NARCAN) 4 mg/0.1 mL nasal spray Administer 1 spray into a nostril. If breathing does not return to normal or if breathing difficulty resumes after 2-3 minutes, give another dose in the other nostril using a new spray. 1 each 1   • nystatin (MYCOSTATIN) powder Apply topically 2 (two) times a day 30 g 0   • omeprazole (PriLOSEC) 40 MG capsule Take 1 capsule (40 mg total) by mouth daily 90 capsule 3   • potassium chloride (Klor-Con M20) 20 mEq tablet Take 2 tablets (40 mEq total) by mouth daily Do not start before March 31, 2024.     • semaglutide, 0.25 or  "0.5 mg/dose, (Ozempic, 0.25 or 0.5 MG/DOSE,) 2 mg/3 mL injection pen Inject 0.75 mL (0.5 mg total) under the skin every 7 days 9 mL 1   • spironolactone (ALDACTONE) 50 mg tablet Take 2 tablets (100 mg total) by mouth daily 180 tablet 1   • tiotropium-olodaterol (Stiolto Respimat) 2.5-2.5 MCG/ACT inhaler Inhale 2 puffs daily 12 g 5   • traZODone (DESYREL) 150 mg tablet Take 1 tablet (150 mg total) by mouth daily at bedtime 30 tablet 5   • EPINEPHrine (EPIPEN) 0.3 mg/0.3 mL SOAJ Inject 0.3 mL (0.3 mg total) into a muscle once for 1 dose 0.6 mL 0     No current facility-administered medications on file prior to visit.      Social History     Tobacco Use   • Smoking status: Former     Current packs/day: 0.00     Average packs/day: 0.3 packs/day for 29.9 years (7.5 ttl pk-yrs)     Types: Cigarettes     Start date:      Quit date: 12/10/2019     Years since quittin.4   • Smokeless tobacco: Never   Vaping Use   • Vaping status: Never Used   Substance and Sexual Activity   • Alcohol use: Not Currently     Alcohol/week: 20.0 standard drinks of alcohol     Types: 20 Cans of beer per week     Comment: about 6 coors light every night, stopped in 2019   • Drug use: No   • Sexual activity: Not Currently     Objective     /70   Pulse (!) 108   Temp (!) 97.4 °F (36.3 °C)   Ht 5' 3\" (1.6 m)   Wt 116 kg (255 lb 9.6 oz)   SpO2 92%   BMI 45.28 kg/m²     Physical Exam  Vitals reviewed.   Constitutional:       Appearance: Normal appearance. She is obese. She is not ill-appearing or diaphoretic.   HENT:      Head: Normocephalic.      Right Ear: External ear normal.      Left Ear: External ear normal.   Cardiovascular:      Rate and Rhythm: Normal rate and regular rhythm.   Pulmonary:      Effort: Pulmonary effort is normal.      Breath sounds: Normal breath sounds.   Musculoskeletal:      Right lower leg: No edema.      Left lower leg: No edema.   Skin:     Capillary Refill: Capillary refill takes less than 2 seconds. "   Neurological:      Mental Status: She is alert and oriented to person, place, and time.   Psychiatric:         Mood and Affect: Mood normal.         Behavior: Behavior normal.       Administrative Statements

## 2024-05-17 LAB
LEFT EYE DIABETIC RETINOPATHY: POSITIVE
RIGHT EYE DIABETIC RETINOPATHY: POSITIVE

## 2024-05-20 DIAGNOSIS — E11.22 TYPE 2 DIABETES MELLITUS WITH STAGE 2 CHRONIC KIDNEY DISEASE, WITHOUT LONG-TERM CURRENT USE OF INSULIN  (HCC): ICD-10-CM

## 2024-05-20 DIAGNOSIS — N18.2 TYPE 2 DIABETES MELLITUS WITH STAGE 2 CHRONIC KIDNEY DISEASE, WITH LONG-TERM CURRENT USE OF INSULIN (HCC): ICD-10-CM

## 2024-05-20 DIAGNOSIS — Z79.4 TYPE 2 DIABETES MELLITUS WITH STAGE 2 CHRONIC KIDNEY DISEASE, WITH LONG-TERM CURRENT USE OF INSULIN (HCC): ICD-10-CM

## 2024-05-20 DIAGNOSIS — E11.22 TYPE 2 DIABETES MELLITUS WITH STAGE 2 CHRONIC KIDNEY DISEASE, WITH LONG-TERM CURRENT USE OF INSULIN (HCC): ICD-10-CM

## 2024-05-20 DIAGNOSIS — N18.2 TYPE 2 DIABETES MELLITUS WITH STAGE 2 CHRONIC KIDNEY DISEASE, WITHOUT LONG-TERM CURRENT USE OF INSULIN  (HCC): ICD-10-CM

## 2024-05-20 RX ORDER — INSULIN GLARGINE 100 [IU]/ML
42 INJECTION, SOLUTION SUBCUTANEOUS
Qty: 37.8 ML | Refills: 1 | Status: SHIPPED | OUTPATIENT
Start: 2024-05-20 | End: 2024-08-18

## 2024-05-20 NOTE — TELEPHONE ENCOUNTER
Reason for call:   [x] Refill   [] Prior Auth  [] Other:     Office:   [] PCP/Provider -   [x] Specialty/Provider -   Lobito Alfredo PA-C  Endocrinology    Medication: Insulin Glargine Solostar (Lantus SoloStar) 100 UNIT/ML SOPN - 42 units at bedtime          Pharmacy: [facility] [place] [address]      Does the patient have enough for 3 days?   [] Yes   [x] No - Send as HP to POD

## 2024-05-21 ENCOUNTER — OFFICE VISIT (OUTPATIENT)
Age: 64
End: 2024-05-21
Payer: MEDICARE

## 2024-05-21 VITALS
BODY MASS INDEX: 46.21 KG/M2 | WEIGHT: 260.8 LBS | SYSTOLIC BLOOD PRESSURE: 102 MMHG | HEIGHT: 63 IN | OXYGEN SATURATION: 93 % | DIASTOLIC BLOOD PRESSURE: 66 MMHG | TEMPERATURE: 97.2 F | HEART RATE: 99 BPM

## 2024-05-21 DIAGNOSIS — J45.50 SEVERE PERSISTENT ASTHMA, UNSPECIFIED WHETHER COMPLICATED: ICD-10-CM

## 2024-05-21 DIAGNOSIS — J44.89 ASTHMA-COPD OVERLAP SYNDROME: ICD-10-CM

## 2024-05-21 DIAGNOSIS — E66.01 MORBID OBESITY (HCC): ICD-10-CM

## 2024-05-21 DIAGNOSIS — G47.33 OBSTRUCTIVE SLEEP APNEA: Primary | ICD-10-CM

## 2024-05-21 PROCEDURE — 99214 OFFICE O/P EST MOD 30 MIN: CPT | Performed by: INTERNAL MEDICINE

## 2024-05-21 PROCEDURE — G2211 COMPLEX E/M VISIT ADD ON: HCPCS | Performed by: INTERNAL MEDICINE

## 2024-05-21 RX ORDER — ALBUTEROL SULFATE 90 UG/1
2 AEROSOL, METERED RESPIRATORY (INHALATION) EVERY 4 HOURS PRN
Qty: 8.5 G | Refills: 5 | Status: SHIPPED | OUTPATIENT
Start: 2024-05-21

## 2024-05-21 RX ORDER — BUDESONIDE 0.5 MG/2ML
0.5 INHALANT ORAL 2 TIMES DAILY
Qty: 60 ML | Refills: 3 | Status: SHIPPED | OUTPATIENT
Start: 2024-05-21

## 2024-05-21 RX ORDER — TIOTROPIUM BROMIDE AND OLODATEROL 3.124; 2.736 UG/1; UG/1
2 SPRAY, METERED RESPIRATORY (INHALATION) DAILY
Qty: 12 G | Refills: 5 | Status: SHIPPED | OUTPATIENT
Start: 2024-05-21

## 2024-05-21 NOTE — PROGRESS NOTES
Pulmonary/Sleep Follow Up Note   Mattie Joy 63 y.o. female MRN: [id number]  5/21/2024      Assessment and Plan:    1. Obstructive sleep apnea  Has been switched to iVAPS because of the recurrent exacerbations and hypoventilation evidence.  Reportedly she tells me that she feels significantly better even her breathing has been improving she sleeps 12 to 13 hours per night on the iVAPS and she use it during the day sometimes if she is going take a nap or even lay down for breathing  I reviewed her compliance today which is excellent using it every night averaging 13 hours and half and her residual AHI is 1.5    2. Severe persistent asthma, unspecified whether complicated  She has been doing fairly well she is on maximum patient therapy she is on Stiolto Respimat and nebulized Pulmicort as well as rescue inhaler and nebulizers.  She has been on Fasenra in the infusion center every 2 months her symptoms are improved significantly    3.  Chronic hypoxemic and hypercapnic respiratory failure  Has been on 24/7 oxygen 4 L she has a portable concentrator and she uses night along with her PAP device    4. Morbid obesity (HCC)  BMI 46.20  Noted and she would benefit from weight loss    4. Asthma-COPD overlap syndrome    - tiotropium-olodaterol (Stiolto Respimat) 2.5-2.5 MCG/ACT inhaler; Inhale 2 puffs daily  Dispense: 12 g; Refill: 5  - budesonide (PULMICORT) 0.5 mg/2 mL nebulizer solution; Take 2 mL (0.5 mg total) by nebulization 2 (two) times a day Rinse mouth after use.  Dispense: 60 mL; Refill: 3  - albuterol (PROVENTIL HFA,VENTOLIN HFA) 90 mcg/act inhaler; Inhale 2 puffs every 4 (four) hours as needed for wheezing  Dispense: 8.5 g; Refill: 5        Return in about 4 months (around 9/21/2024).    History of Present Illness   HPI:  Mattie Joy is a 63 y.o. female who is here for follow-up appointment she was last seen in February as a hospital follow-up in our office where she was admitted for acute on chronic hypoxic  respiratory failure secondary to acute exacerbation of congestive heart failure with no evidence of exacerbation of underlying asthma/COPD.  Overall she feels much better back to baseline she actually tells me that her breathing has been significantly improved since she was started on iVAPS.  She remains on Stiolto Respimat, inhaled rescue inhalers and nebulizers as well as Pulmicort nebulizer around-the-clock        Historical Information   Past Medical History:   Diagnosis Date    Acute blood loss anemia 06/01/2021    Acute diverticulitis 05/02/2021    Acute on chronic diastolic congestive heart failure (HCC) 05/21/2020    Acute on chronic respiratory failure with hypoxia (HCC) 11/30/2018    Alcohol abuse 05/21/2020    Anemia     Asthma with COPD with exacerbation  (Formerly [facility]) 11/12/2020    Atrial fibrillation (Formerly [facility])     Cervical radiculopathy     CHF (congestive heart failure) (Formerly [facility])     Chronic low back pain     Chronic obstructive asthma 02/20/2018    Cigarette nicotine dependence without complication 11/20/2019    Quit 12/10/2019.     Community acquired pneumonia 05/21/2020    COPD exacerbation (Former[facility]) 09/16/2020    Depression with anxiety 03/09/2014    Diabetes mellitus with hyperglycemia (Formerly [facility])     Diverticulitis 06/06/2021    Elevated liver enzymes     GERD (gastroesophageal reflux disease)     Hypersomnolence 10/28/2020    Hypokalemia 12/04/2018    Lower gastrointestinal bleeding 06/01/2021    Paresthesia of upper extremity     Plantar fasciitis     Restless legs syndrome 04/08/2014    Severe persistent asthma with exacerbation 02/20/2018    PFTs February 8, 2018:  FEV1 0.87 L-35% predicted, 27% improvement post-bronchodilator.  DLCO-82% predicted,  The patient has been having worsening of her symptoms now for few months, especially  from January 2020, also she had multiple exacerbations last year and most recently required a steroid burst and August  Reviewing her CT scan  New PFTs with severe obstruction reviewed   Hypersensitivity p    Sexual dysfunction     Sleep apnea, obstructive     Tenosynovitis of finger     Tinea corporis     Tobacco use 2018    Currently smoking 3-4 cigarettes daily    Trigger middle finger of left hand 2017    Trigger ring finger of left hand 2017    Weakness of upper extremity      Past Surgical History:   Procedure Laterality Date    ABDOMINAL SURGERY      CARPAL TUNNEL RELEASE Bilateral      SECTION      CHOLECYSTECTOMY      laparoscopic    ESOPHAGOGASTRODUODENOSCOPY N/A 12/3/2018    Procedure: ESOPHAGOGASTRODUODENOSCOPY (EGD) AND COLONOSCOPY;  Surgeon: Ludmila Perry MD;  Location: [facility];  Service: Gastroenterology    GASTRIC BYPASS      for morbid obesity with gilda en y    HERNIA REPAIR      HYSTERECTOMY      NJ EXCISION GANGLION WRIST DORSAL/VOLAR PRIMARY Left 2017    Procedure: LONG FINGER GANGLION CYST EXCISION;  Surgeon: Philippe Clark MD;  Location: [facility];  Service: Orthopedics    NJ TENDON SHEATH INCISION Left 2017    Procedure: THUMB, LONG, RING, TRIGGER FINGER RELEASE;  Surgeon: Philippe Clark MD;  Location: [facility];  Service: Orthopedics    SHOULDER SURGERY Right      Family History   Problem Relation Age of Onset    Alzheimer's disease Mother     Atrial fibrillation Mother     Dementia Mother     Heart disease Mother         heart problem    Seizures Mother     Parkinsonism Father     Arthritis Father     Atrial fibrillation Father     No Known Problems Daughter     Cri-du-chat syndrome Daughter     Colon cancer Maternal Grandmother 80    Diabetes type II Maternal Grandmother     No Known Problems Brother     No Known Problems Son     Substance Abuse Neg Hx         mother,father    Mental illness Neg Hx         mother,father    Colon polyps Neg Hx          Meds/Allergies     Current Outpatient Medications:     acetaminophen (TYLENOL) 650 mg CR tablet, Take 650 mg by mouth every 8 (eight) hours as needed for mild pain, Disp: ,  Rfl:     albuterol (2.5 mg/3 mL) 0.083 % nebulizer solution, Take 3 mL (2.5 mg total) by nebulization every 6 (six) hours as needed for wheezing or shortness of breath, Disp: 1080 mL, Rfl: 3    albuterol (PROVENTIL HFA,VENTOLIN HFA) 90 mcg/act inhaler, Inhale 2 puffs every 4 (four) hours as needed for wheezing, Disp: 8.5 g, Rfl: 5    apixaban (Eliquis) 5 mg, Take 1 tablet (5 mg total) by mouth 2 (two) times a day, Disp: 60 tablet, Rfl: 2    atorvastatin (LIPITOR) 40 mg tablet, Take 1 tablet (40 mg total) by mouth daily, Disp: 90 tablet, Rfl: 3    Blood Glucose Monitoring Suppl (Contour Next One) AILYN, USE AS DIRECTED 3 TIMES A DAY (Patient taking differently: USE AS DIRECTED 3 TIMES A DAY  Once a day), Disp: 1 each, Rfl: 0    budesonide (PULMICORT) 0.5 mg/2 mL nebulizer solution, Take 2 mL (0.5 mg total) by nebulization 2 (two) times a day Rinse mouth after use., Disp: 60 mL, Rfl: 3    bumetanide (BUMEX) 2 mg tablet, Take 3 tablets (6 mg total) by mouth 2 (two) times a day, Disp: 540 tablet, Rfl: 1    Contour Next Test test strip, USE TO TEST BLOOD SUGAR 3 TIMES A DAY (Patient taking differently: USE TO TEST BLOOD SUGAR 3 TIMES A DAY  Once a day), Disp: 100 strip, Rfl: 3    [employer]S Lancets Thin 26G MISC, USE 3 (THREE) TIMES A DAY (Patient taking differently: Once a day), Disp: 100 each, Rfl: 5    dapagliflozin (Farxiga) 10 MG tablet, Take 1 tablet (10 mg total) by mouth daily Do not start before March 31, 2024., Disp: , Rfl:     diltiazem (CARDIZEM CD) 120 mg 24 hr capsule, Take 1 capsule (120 mg total) by mouth daily, Disp: 90 capsule, Rfl: 1    DULoxetine (CYMBALTA) 60 mg delayed release capsule, Take 60 mg by mouth daily, Disp: , Rfl:     EPINEPHrine (EPIPEN) 0.3 mg/0.3 mL SOAJ, Inject 0.3 mL (0.3 mg total) into a muscle once for 1 dose, Disp: 0.6 mL, Rfl: 0    fluticasone (FLONASE) 50 mcg/act nasal spray, 1 spray into each nostril 2 (two) times a day, Disp: 8 g, Rfl: 0    gabapentin (NEURONTIN) 300 mg capsule, Take  300 mg by mouth daily Pt reports taking 300 mg TID, Disp: , Rfl:     Insulin Glargine Solostar (Lantus SoloStar) 100 UNIT/ML SOPN, Inject 0.42 mL (42 Units total) as directed daily at bedtime Inject 45 units daily at bedtime, Disp: 37.8 mL, Rfl: 1    insulin glulisine (Apidra SoloStar) 100 units/mL injection pen, Inject 15 Units under the skin 3 (three) times a day with meals 15 units 3 times a day with meals, Disp: 15 mL, Rfl: 6    Insulin Lispro-aabc, 1 U Dial, (Lyumjev KwikPen) 100 UNIT/ML SOPN, Inject 15 Units under the skin 3 (three) times a day before meals, Disp: 30 mL, Rfl: 3    Insulin Pen Needle (BD Pen Needle Love 2nd Gen) 32G X 4 MM MISC, Use daily at bedtime Use 4 new needles daily ., Disp: 400 each, Rfl: 3    levalbuterol (XOPENEX) 1.25 mg/0.5 mL nebulizer solution, Take 0.5 mL (1.25 mg total) by nebulization 2 (two) times a day, Disp: 60 vial, Rfl: 0    loratadine (CLARITIN) 10 mg tablet, Take 1 tablet (10 mg total) by mouth daily as needed for allergies, Disp: 30 tablet, Rfl: 0    LORazepam (ATIVAN) 0.5 mg tablet, TAKE 2 TABLETS BY MOUTH AT BEDTIME AND 1 EVERY 6 HOURS AS NEEDED FOR ANXIETY, Disp: 90 tablet, Rfl: 0    metoprolol succinate (TOPROL-XL) 100 mg 24 hr tablet, Take 1 tablet (100 mg total) by mouth daily, Disp: 90 tablet, Rfl: 3    montelukast (SINGULAIR) 10 mg tablet, Take 1 tablet (10 mg total) by mouth daily at bedtime, Disp: 90 tablet, Rfl: 3    naloxone (NARCAN) 4 mg/0.1 mL nasal spray, Administer 1 spray into a nostril. If breathing does not return to normal or if breathing difficulty resumes after 2-3 minutes, give another dose in the other nostril using a new spray., Disp: 1 each, Rfl: 1    nystatin (MYCOSTATIN) powder, Apply topically 2 (two) times a day, Disp: 30 g, Rfl: 0    omeprazole (PriLOSEC) 40 MG capsule, Take 1 capsule (40 mg total) by mouth daily, Disp: 90 capsule, Rfl: 3    potassium chloride (Klor-Con M20) 20 mEq tablet, Take 2 tablets (40 mEq total) by mouth daily Do  "not start before March 31, 2024., Disp: , Rfl:     semaglutide, 0.25 or 0.5 mg/dose, (Ozempic, 0.25 or 0.5 MG/DOSE,) 2 mg/3 mL injection pen, Inject 0.75 mL (0.5 mg total) under the skin every 7 days, Disp: 9 mL, Rfl: 1    spironolactone (ALDACTONE) 50 mg tablet, Take 2 tablets (100 mg total) by mouth daily, Disp: 180 tablet, Rfl: 1    tiotropium-olodaterol (Stiolto Respimat) 2.5-2.5 MCG/ACT inhaler, Inhale 2 puffs daily, Disp: 12 g, Rfl: 5    traZODone (DESYREL) 150 mg tablet, Take 1 tablet (150 mg total) by mouth daily at bedtime, Disp: 30 tablet, Rfl: 5    Vitamin D, Cholecalciferol, 50 MCG (2000 UT) CAPS, Take 2,000 Int'l Units/day by mouth in the morning, Disp: 90 capsule, Rfl: 3  Allergies   Allergen Reactions    Iron Dextran Swelling    Januvia [Sitagliptin] Shortness Of Breath    Jardiance [Empagliflozin] Shortness Of Breath    Clonazepam Other (See Comments)     Unknown reaction    Codeine Itching and Other (See Comments)     itch;mary kay morphine,takes ultram @home    Adhesive [Medical Tape] Itching     itching    Effexor [Venlafaxine] Itching    Lactose - Food Allergy GI Intolerance    Oxycodone-Acetaminophen Anxiety    Oxycodone-Acetaminophen Itching and Rash     Other reaction(s): Other (See Comments)  (PERCOCET) MILD RASH, not allergic to Acetaminophen        Vitals: Blood pressure 102/66, pulse 99, temperature (!) 97.2 °F (36.2 °C), temperature source Tympanic, height 5' 3\" (1.6 m), weight 118 kg (260 lb 12.8 oz), SpO2 93%, not currently breastfeeding. Body mass index is 46.2 kg/m². Oxygen Therapy  SpO2: 93 %  Oxygen Therapy: Supplemental oxygen  O2 Delivery Method: Nasal cannula  O2 Flow Rate (L/min): 4 L/min      Physical Exam  General: , Awake alert and oriented x 3, conversant without conversational dyspnea, NAD, normal affect  HEENT:   Sclera noninjected, nonicteric OU, Nares patent  NECK:  Trachea midline, no accessory muscle use, no stridor,  JVP not elevated  CARDIAC: Reg, single s1/S2, no " "m/r/g  PULM: Bilateral diminished air entry  EXT: No cyanosis, no clubbing, no edema  NEURO: no focal neurologic deficits    Labs: I have personally reviewed pertinent lab results., ABG: No results found for: \"PHART\", \"JRK1OTT\", \"PO2ART\", \"WKB0BHH\", \"B1DGTADK\", \"BEART\", \"SOURCE\", BNP: No results found for: \"BNP\", CBC: No results found for: \"WBC\", \"HGB\", \"HCT\", \"MCV\", \"PLT\", \"ADJUSTEDWBC\", \"RBC\", \"MCH\", \"MCHC\", \"RDW\", \"MPV\", \"NRBC\", CMP: No results found for: \"SODIUM\", \"K\", \"CL\", \"CO2\", \"ANIONGAP\", \"BUN\", \"CREATININE\", \"GLUCOSE\", \"CALCIUM\", \"AST\", \"ALT\", \"ALKPHOS\", \"PROT\", \"BILITOT\", \"EGFR\", PT/INR: No results found for: \"PT\", \"INR\", Troponin: No results found for: \"TROPONINI\"  Lab Results   Component Value Date    WBC 11.73 (H) 03/10/2024    HGB 11.1 (L) 03/10/2024    HCT 39.8 03/10/2024    MCV 67 (L) 03/10/2024     03/10/2024     Lab Results   Component Value Date    GLUCOSE 87 02/26/2024    CALCIUM 8.8 03/10/2024     09/22/2017    K 5.6 (H) 05/13/2024    CO2 27 05/13/2024    CL 85 (L) 05/13/2024    BUN 24 05/13/2024    CREATININE 1.06 (H) 05/13/2024     No results found for: \"IGE\"  Lab Results   Component Value Date    ALT 13 03/08/2024    AST 22 03/08/2024    ALKPHOS 137 (H) 03/08/2024    BILITOT 0.4 09/22/2017         Imaging and other studies: I have personally reviewed pertinent reports.    No orders to display   CT:  IMPRESSION:     Heterogeneous hepatic attenuation, may be secondary to steatosis. If there is concern for hepatitis, correlate with liver function and hepatitis testing.     Mosaic attenuation throughout the lungs suggesting air trapping/small airway disease. This is similar to multiple prior examinations.           Pulmonary function testing:   FEV1/FVC Ratio: 57 % (72% predicted)  Forced Vital Capacity:  1.08L    36 % predicted  FEV1: 0.62 L     26 % predicted  After administration of bronchodilator FEV1: 0.84 (+36%)     Lung volumes by body plethysmography: Total Lung Capacity 102 " "% predicted Residual volume 175 % predicted  RV/TLC ratio 171%     DLCO corrected for patients hemoglobin level: 54 %     6MWT:  Walked total of 2 mins; distance 109.6m          Compliance report:I have personally reviewed pertinent reports.      Type of CPAP:  iVAPS EPAP 10-15 (PS 12-14)                                   Percent usage: 52%                                   Average time used: 13 hrs 26 min                                  Time in large leak: not high                                   Residual AHI: 1.5              Tin Brennan MD  [facility]       Portions of the record may have been created with voice recognition software. Occasional wrong word or \"sound a like\" substitutions may have occurred due to the inherent limitations of voice recognition software. Read the chart carefully and recognize, using context, where substitutions have occurred.  "

## 2024-05-21 NOTE — PATIENT INSTRUCTIONS
Sleep Apnea   AMBULATORY CARE:   Sleep apnea  is a condition that causes you to stop breathing often during sleep.  Types of sleep apnea:   Obstructive sleep apnea (WILMA)  is the most common kind. The muscles and tissues around your throat relax and block air from passing through. Obesity, use of alcohol or cigarettes, or a family history are common causes. WILMA may increase your risk for complications after surgery.         Central sleep apnea (CSA)  means your brain does not send signals to the muscles that control breathing. You do not take a breath even though your airway is open. Common causes include medical conditions such as heart failure, being older than 40, or use of opioids.    Complex (or mixed) sleep apnea  means you have both obstructive and central sleep apnea.    Common signs and symptoms:   Loud snoring or long pauses in breathing    Feeling sleepy, slow, and tired during the day    Snorting, gasping, or choking while you sleep, and waking up suddenly because of these    Feeling irritable during the day    Dry mouth or a headache in the mornings    Heavy night sweating    A hard time thinking, remembering things, or focusing on your tasks the following day    Call your local emergency number (911 in the ) if:   You have chest pain or trouble breathing.      Call your doctor if:   You have new or worsening signs or symptoms.     You have questions or concerns about your condition or care.    Treatment  depends on the kind of apnea you have.  A mouth device  may be needed if you have mild sleep apnea. These are designed to keep your throat open. Ask your dentist or healthcare provider about the best mouth device for you.    A machine  may be used to help you get more air during sleep. A mask may be placed over your nose and mouth, or just your nose. The mask is hooked to the machine. You will get air through the mask.    A continuous positive airway pressure (CPAP) machine  is used to keep your  airway open during sleep. The machine blows a gentle stream of air into the mask when you breathe. This helps keep your airway open so you can breathe more regularly. Extra oxygen may be given through the machine.         A bilevel positive airway pressure (BiPAP) machine  gives air but lowers the pressure when you breathe out.    An adaptive servo-ventilator (ASV)  is a machine that learns your usual breathing pattern. Then, it uses pressure to give you air and prevent stops in your breathing.    Surgery  to expand your airway or remove extra tissues may be needed. Surgery is usually only considered if other treatments do not work.    Manage or prevent sleep apnea:   Reach and maintain a healthy weight.  Ask your healthcare provider what a healthy weight is for you. Ask your provider to help you create a safe weight loss plan if you are overweight. Even a small goal of a 10% weight loss can improve your symptoms.    Do not smoke.  Nicotine and other chemicals in cigarettes and cigars can cause lung damage. Ask your healthcare provider for information if you currently smoke and need help to quit. E-cigarettes or smokeless tobacco still contain nicotine. Talk to your healthcare provider before you use these products.    Do not drink alcohol or take sedative medicine before you go to sleep.  Alcohol and sedatives can relax the muscles and tissues around your throat. This can block the airflow to your lungs.    Sleep on your side or use pillows designed to prevent sleep apnea.  This prevents your tongue or other tissues from blocking your throat. You can also raise the head of your bed.    Follow up with your doctor or specialist as directed:  You may need to have blood tests during your follow-up visits. Work with your provider to find the right breathing support equipment and settings for you. Write down your questions so you remember to ask them during your visits.  © Copyright [employer] 2023 Information is for End  User's use only and may not be sold, redistributed or otherwise used for commercial purposes.  The above information is an  only. It is not intended as medical advice for individual conditions or treatments. Talk to your doctor, nurse or pharmacist before following any medical regimen to see if it is safe and effective for you.

## 2024-05-24 LAB
25(OH)D3+25(OH)D2 SERPL-MCNC: 19 NG/ML (ref 30–100)
ALBUMIN SERPL-MCNC: 4.2 G/DL (ref 3.9–4.9)
ALBUMIN/GLOB SERPL: 2 {RATIO} (ref 1.2–2.2)
ALP SERPL-CCNC: 147 IU/L (ref 44–121)
ALT SERPL-CCNC: 22 IU/L (ref 0–32)
AST SERPL-CCNC: 18 IU/L (ref 0–40)
BILIRUB SERPL-MCNC: 0.2 MG/DL (ref 0–1.2)
BUN SERPL-MCNC: 27 MG/DL (ref 8–27)
BUN/CREAT SERPL: 20 (ref 12–28)
CALCIUM SERPL-MCNC: 8.6 MG/DL (ref 8.7–10.3)
CHLORIDE SERPL-SCNC: 90 MMOL/L (ref 96–106)
CO2 SERPL-SCNC: 24 MMOL/L (ref 20–29)
CREAT SERPL-MCNC: 1.37 MG/DL (ref 0.57–1)
EGFR: 43 ML/MIN/1.73
GLOBULIN SER-MCNC: 2.1 G/DL (ref 1.5–4.5)
GLUCOSE SERPL-MCNC: 388 MG/DL (ref 70–99)
POTASSIUM SERPL-SCNC: 5.1 MMOL/L (ref 3.5–5.2)
PROT SERPL-MCNC: 6.3 G/DL (ref 6–8.5)
SODIUM SERPL-SCNC: 129 MMOL/L (ref 134–144)

## 2024-05-30 ENCOUNTER — TELEPHONE (OUTPATIENT)
Age: 64
End: 2024-05-30

## 2024-05-30 NOTE — TELEPHONE ENCOUNTER
"Patient was seen in the office on 5/2.    She called today to report that she has been feeling a \"buzzing\" sensation in the area of her left breast. She said she feels it inside her left breast.When she has the sensation, if she puts her hand over the area, she can't feel a buzz or vibration with her hand.      This has been occurring for about a week and a half, lasts for about 10-30 seconds, goes away and might repeat in about an hour. Sometimes 10 minutes later. This has been occurring every day.    She has no other symptoms, not short of breath, not lightehaded or dizzy. , HR 96.    She has the Zoll patch near her left armpit.  "

## 2024-05-31 ENCOUNTER — CLINICAL SUPPORT (OUTPATIENT)
Dept: CARDIOLOGY CLINIC | Facility: CLINIC | Age: 64
End: 2024-05-31
Payer: MEDICARE

## 2024-05-31 ENCOUNTER — TELEPHONE (OUTPATIENT)
Dept: FAMILY MEDICINE CLINIC | Facility: HOSPITAL | Age: 64
End: 2024-05-31

## 2024-05-31 VITALS
HEART RATE: 95 BPM | BODY MASS INDEX: 45.36 KG/M2 | HEIGHT: 63 IN | DIASTOLIC BLOOD PRESSURE: 66 MMHG | SYSTOLIC BLOOD PRESSURE: 102 MMHG | WEIGHT: 256 LBS

## 2024-05-31 DIAGNOSIS — I48.0 PAROXYSMAL ATRIAL FIBRILLATION (HCC): Primary | ICD-10-CM

## 2024-05-31 PROCEDURE — RECHECK: Performed by: INTERNAL MEDICINE

## 2024-05-31 PROCEDURE — 93000 ELECTROCARDIOGRAM COMPLETE: CPT

## 2024-05-31 NOTE — PROGRESS NOTES
Pt here for EKG per Michael SANTAMARIA. Pt has been feeling a vibration in the left side of her breast that lasts approx 30 secs. It has been happening for approx 2 weeks, and occurs randomly 5-6 x day. No other associated symptoms occur with it. Pt has a Zoll HF management system applied on left side, but states she does not feel like the vibration comes from it.    EKG and vitals obtained. Meds and allergies reviewed.    /66  P 95    EKG reviewed by Dr Olsen. EKG is not indicative of anything that could be causing this sensation for the pt. Pt to follow up in a month. Pt to call us if vibration gets worse and to go to ER if other cardiac sx develop with it.

## 2024-05-31 NOTE — TELEPHONE ENCOUNTER
----- Message from Laith Olivares MD sent at 5/31/2024  8:34 AM EDT -----  Please call.  Iris showing dm retinopathy.   She should be seen in fu with her ophthalmologist for treatment.

## 2024-05-31 NOTE — TELEPHONE ENCOUNTER
Good morning is it the actual device which is buzzing? If so we can reach out to Lenny to make sure everything is working ok. If it is not the device then I'm not sure what would be causing this sensation. We could certainly bring her in for a nursing visit to check an EKG/ Heart rate/ BP if it is not the device itself. Thanks!

## 2024-06-11 ENCOUNTER — TELEPHONE (OUTPATIENT)
Age: 64
End: 2024-06-11

## 2024-06-11 ENCOUNTER — HOSPITAL ENCOUNTER (OUTPATIENT)
Dept: INFUSION CENTER | Facility: HOSPITAL | Age: 64
Discharge: HOME | End: 2024-06-11
Attending: INTERNAL MEDICINE
Payer: MEDICARE

## 2024-06-11 VITALS
DIASTOLIC BLOOD PRESSURE: 61 MMHG | HEART RATE: 86 BPM | OXYGEN SATURATION: 95 % | RESPIRATION RATE: 16 BRPM | TEMPERATURE: 97 F | SYSTOLIC BLOOD PRESSURE: 106 MMHG

## 2024-06-11 DIAGNOSIS — Z79.899 ENCOUNTER FOR LONG-TERM (CURRENT) DRUG USE: ICD-10-CM

## 2024-06-11 DIAGNOSIS — J44.89 ASTHMA-COPD OVERLAP SYNDROME: Primary | ICD-10-CM

## 2024-06-11 PROCEDURE — 96372 THER/PROPH/DIAG INJ SC/IM: CPT

## 2024-06-11 RX ORDER — EPINEPHRINE 1 MG/ML
0.3 INJECTION, SOLUTION, CONCENTRATE INTRAVENOUS
OUTPATIENT
Start: 2024-07-09

## 2024-06-11 RX ADMIN — BENRALIZUMAB 30 MG: 30 INJECTION, SOLUTION SUBCUTANEOUS at 13:55

## 2024-06-11 NOTE — TELEPHONE ENCOUNTER
Reason for call:   [x] Refill   [] Prior Auth  [] Other:     Office:   [x] PCP/Provider -   [] Specialty/Provider -     Medication: ATIVAN    Dose/Frequency: 0.5 MG    Quantity: 90    [place]   [facility] [address]  [address] [address]  Phone: [phone]  Fax: [phone]  DIANE #: --     Does the patient have enough for 3 days?   [x] Yes   [] No - Send as HP to POD

## 2024-06-11 NOTE — TELEPHONE ENCOUNTER
Brandie with [facility] calling. Pt there for her Fasenra injection. She is currently hypotensive (84/59) and Brandie is wondering if ok to proceed with injection.     TT Dr. Brennan. While awaiting decision from Dr. Brennan, spoke with Brandie again. Pt's BP now 106/61. Pt was asymptomatic. Per Dr. Brennan, ok to proceed with injection.

## 2024-06-11 NOTE — PROGRESS NOTES
2024       Jimmy Conner MD  [address]   [address]  [facility] [address]  Via Fax: [phone]      Patient: Clover Sousa   YOB: 2020   Date of Visit: 2024       Dear Dr. Conner:    Thank you for referring Clover Sousa to me for evaluation. Below are my notes for this visit with her.    If you have questions, please do not hesitate to call me. I look forward to following your patient along with you.      Sincerely,        Tessa Mendiola MD        CC: No Recipients  Tessa Mendiola MD  2024  1:51 PM  Signed  PEDIATRIC NEUROLOGY   NEW CONSULT NOTE      Patient: Clover Sousa Date of Service: 2023   : 2020 MRN: [id number]     SUBJECTIVE:   2024  No spells   Doing fine developmentally     Sleep is ok  KG to start in   Doing fine   Developmentally is making good progress    No seizures to report     Sleep is good     2022  No spells reported   Developmentally is doing fine     Learning the numbers, colors, body parts   Socially great     Sleep is ok    Initial visit   HISTORY OF PRESENT ILLNESS:  Clover Sousa is a 2 year old female who presents today for hospital f/u  Admitted for 2 Seizures, was sick but not fever on the day of seizures     Since going home   No spells     No history of head injury with loc, brain infection, convulsions     Hospitalization - as above   Born normal  Developmentally - appropriate   Sleep - ok  Family history - none significant       PAST MEDICAL HISTORY:  History reviewed. No pertinent past medical history.    MEDICATIONS:  Current Outpatient Medications   Medication Sig   • triamcinolone (ARISTOCORT) 0.1 % ointment APPLY TOPICALLY TO THE AFFECTED AREA TWICE DAILY FOR 14 DAYS   • ibuprofen (CHILDRENS ADVIL) 100 MG/5ML suspension Take 5.4 mLs by mouth every 6 hours as needed for Pain or Fever.   • diazePAM (DIASTAT ACUDIAL) 10 MG rectal gel Place 7.5 mg rectally as needed (Seizure).  Patient arrived to infusion center today for Fasenra inj.  BP 84/59 on arrival.  Jessica BROWNING with Dr. Marques office notified.      • Acetaminophen (TYLENOL CHILDRENS PO) Take 5 mLs by mouth daily as needed (pain/fever).     No current facility-administered medications for this visit.       ALLERGIES:  ALLERGIES:  No Known Allergies    PAST SURGICAL HISTORY:  Past Surgical History:   Procedure Laterality Date   • Eeg inc recording awake and drowsy  8/23/2021            FAMILY HISTORY:  Family History   Problem Relation Age of Onset   • Hypertension Maternal Grandfather    • COPD Maternal Grandfather    • Congestive Heart Failure Maternal Grandfather    • Diabetes Maternal Grandfather    • Stroke Paternal Grandfather        SOCIAL HISTORY:  Social History     Tobacco Use   • Smoking status: Never   • Smokeless tobacco: Never       Review of Systems   Constitutional: Negative for appetite change and irritability.   HENT: Negative for congestion, drooling, ear discharge and facial swelling.    Eyes: Negative for discharge and redness.   Respiratory: Negative for apnea and cough.    Cardiovascular: Negative for leg swelling and cyanosis.   Gastrointestinal: Negative for diarrhea and vomiting.   Genitourinary: Negative for enuresis and hematuria.   Musculoskeletal: Negative for neck stiffness.   Skin: Negative for color change, pallor and rash.   Allergic/Immunologic: Negative for food allergies.   Neurological: Negative for tremors, seizures, syncope, facial asymmetry, speech difficulty, weakness and headaches.   Hematological: Does not bruise/bleed easily.   Psychiatric/Behavioral: Negative for agitation, behavioral problems and sleep disturbance. The patient is not hyperactive.          OBJECTIVE:     Physical Exam  Vitals and nursing note reviewed.   Constitutional:       General: She is active.      Appearance: She is well-developed.   HENT:      Head: No signs of injury.      Right Ear: Tympanic membrane normal.      Left Ear: Tympanic membrane normal.      Nose: Nose normal.      Mouth/Throat:      Mouth: Mucous membranes are moist.       Pharynx: Oropharynx is clear.   Eyes:      General:         Right eye: No discharge.         Left eye: No discharge.      Conjunctiva/sclera: Conjunctivae normal.      Pupils: Pupils are equal, round, and reactive to light.   Cardiovascular:      Heart sounds: No murmur heard.  Pulmonary:      Breath sounds: Normal breath sounds.   Musculoskeletal:         General: No signs of injury. Normal range of motion.      Cervical back: Neck supple.      Comments:      Skin:     General: Skin is warm.      Findings: No rash.   Neurological:      Comments: Awake, playful and interactive  Symmetric face   EOMI, LUIS   Tone and dtr's intact   fnf intact          Visit Vitals  Ht 3' 1.8\" (0.96 m)   Wt (!) 12.5 kg (27 lb 9 oz)   BMI 13.57 kg/m²       DIAGNOSTIC STUDIES:   LAB RESULTS:    Lab Results Reviewed and Diagnostic Data Reviewed    Assessment AND PLAN:   June 4, 2024  Has remained stable  No spells in the interim     Acute seizure management discussed again   Common triggers reviewed       June 14, 2023  Doing great   No concerns from the mother   No developmental issues reported     To monitor for spells   Acute seizure management and the use of Diastat discussed as well     April 21, 2022  Has remained stable   No interim issues reported   Acute seizure management and use of Diastat was discussed again    Hospital f/u  History of 2 seizures with an illness, without fever on the day of episodes   EEG - normal awake   MRI brain was reported unremarkable     Repeating a sleep deprived eeg was discussed   Mother is ok in continuing to observe for now     Acute seizure management was discussed besides the use of Diastat     No follow-ups on file.    Instructions provided as documented in the AVS.    Patient Discussion:.   I have counseled the family extensively regarding the nature of the patient's difficulties, course, prognosis, plan, recommendations and outcome. The family will keep me informed of the patient's progress.      The patient and mother indicated understanding of the diagnosis and agreed with the plan of care.    Tessa Mendiola MD

## 2024-06-11 NOTE — PROGRESS NOTES
Repeat /61 Jessica BROWNING notified.  No new orders at this time.  Ok to proceed with current orders.

## 2024-06-11 NOTE — PROGRESS NOTES
"Patient arrived for Fasenra injection. Patient has epipen at the bedside. Epiration date 07/2024 and patient had second epipen with expiration of 03/2025.     Patient asked if she know how to use epipen in the even she needed it. Patient stated \"no.\" RN educated patient on how to use epipen patient verbalized understanding and demonstrated with  epipen that came with her prescription.       Patient received Fasenra injection without complication. 30 minute observation completed without incident. Patient discharged in stable condition to home. Patient verified next appointment on 8/6.  Anaphylaxis instruction included in AVS and given to patient.  "

## 2024-06-13 ENCOUNTER — TELEPHONE (OUTPATIENT)
Dept: CARDIOLOGY CLINIC | Facility: CLINIC | Age: 64
End: 2024-06-13

## 2024-06-13 RX ORDER — LORAZEPAM 0.5 MG/1
TABLET ORAL
Qty: 90 TABLET | Refills: 0 | Status: SHIPPED | OUTPATIENT
Start: 2024-06-13

## 2024-06-17 ENCOUNTER — OFFICE VISIT (OUTPATIENT)
Dept: ENDOCRINOLOGY | Facility: HOSPITAL | Age: 64
End: 2024-06-17
Payer: MEDICARE

## 2024-06-17 VITALS
HEART RATE: 100 BPM | SYSTOLIC BLOOD PRESSURE: 124 MMHG | DIASTOLIC BLOOD PRESSURE: 80 MMHG | BODY MASS INDEX: 45.53 KG/M2 | WEIGHT: 257 LBS

## 2024-06-17 DIAGNOSIS — E11.22 TYPE 2 DIABETES MELLITUS WITH STAGE 2 CHRONIC KIDNEY DISEASE, WITH LONG-TERM CURRENT USE OF INSULIN (HCC): Primary | ICD-10-CM

## 2024-06-17 DIAGNOSIS — N18.2 TYPE 2 DIABETES MELLITUS WITH STAGE 2 CHRONIC KIDNEY DISEASE, WITH LONG-TERM CURRENT USE OF INSULIN (HCC): Primary | ICD-10-CM

## 2024-06-17 DIAGNOSIS — Z79.4 TYPE 2 DIABETES MELLITUS WITH STAGE 2 CHRONIC KIDNEY DISEASE, WITH LONG-TERM CURRENT USE OF INSULIN (HCC): Primary | ICD-10-CM

## 2024-06-17 PROBLEM — I48.11 LONGSTANDING PERSISTENT ATRIAL FIBRILLATION (HCC): Status: ACTIVE | Noted: 2018-12-12

## 2024-06-17 LAB — SL AMB POCT HEMOGLOBIN AIC: 7.7 (ref ?–6.5)

## 2024-06-17 PROCEDURE — 95251 CONT GLUC MNTR ANALYSIS I&R: CPT | Performed by: PHYSICIAN ASSISTANT

## 2024-06-17 PROCEDURE — 99214 OFFICE O/P EST MOD 30 MIN: CPT | Performed by: PHYSICIAN ASSISTANT

## 2024-06-17 PROCEDURE — 83036 HEMOGLOBIN GLYCOSYLATED A1C: CPT | Performed by: PHYSICIAN ASSISTANT

## 2024-06-17 RX ORDER — INSULIN LISPRO 100 [IU]/ML
15 INJECTION, SOLUTION INTRAVENOUS; SUBCUTANEOUS
COMMUNITY
Start: 2024-05-23

## 2024-06-17 NOTE — PATIENT INSTRUCTIONS
Monitor diet.  Make sure to drink plenty water throughout the day.     Increase Ozempic to 1 mg weekly.     Decrease Lantus to 42 units daily.  Continue Apidra  15 units with each meal.     Call in blood sugars in about 2 weeks so we can make adjustments.     Contact the office with any concerns or questions.     Follow-up in 3 months with lab work completed prior to visit.

## 2024-06-17 NOTE — PROGRESS NOTES
"Cardiology Office Follow Up  Mattie Joy  1960  [id number]      ASSESSMENT:  Left sided buzzing sensation  Chronic diastolic heart failure  8/15/2023 echo: LVEF 65%, grade 2 diastolic dysfunction, severely elevated RV systolic pressure 61 mmHg  Current diuretic: Bumex 6 BID, Aldactone 100 QD, potassium 40 QD  Baseline wt around 255 lbs  Persistent atrial fibrillation  Thromboembolic PPx: Eliquis 5 twice daily  Rate control: Toprol- mg QD, Diltiazem 120 QD  COPD   Type 2 diabetes  Obesity, BMI 51  Pulmonary hypertension likely group 2/3 from left heart disease, underlying lung disease, OHV    PLAN/ DISCUSSION:  Euvolemic today with heart rate and rhythm controlled on exam  Continue Bumex 6 twice daily  Continue potassium 40 daily  Continue spironolactone 50 daily  Continue Eliquis 5 twice daily  Continue Toprol- daily  Continue diltiazem 120 daily  No changes made to medications today  Follow-up next month as scheduled    Interval History/ HPI:   She is here today for follow-up.  She does have a left-sided buzzing sensation lasting 10-15 seconds at a time.  There are no aggravating or alleviating factors.  It occurs several times per day.  She says it is not particularly bothersome.  Otherwise her breathing remains stable.  She has no chest pain or chest pressure.  Weight is stable in the low to 250s at home.  She remains on high-dose Bumex.     Vitals:  /86 (BP Location: Left arm, Patient Position: Sitting, Cuff Size: Standard)   Pulse 88   Ht 5' 3\" (1.6 m)   Wt 115 kg (253 lb) Comment: at home weight  BMI 44.82 kg/m²      Past Medical History:   Diagnosis Date    Acute blood loss anemia 06/01/2021    Acute diverticulitis 05/02/2021    Acute on chronic diastolic congestive heart failure (HCC) 05/21/2020    Acute on chronic respiratory failure with hypoxia (HCC) 11/30/2018    Alcohol abuse 05/21/2020    Anemia     Asthma with COPD with exacerbation  (HCC) 11/12/2020    Atrial fibrillation " (HCC)     Cervical radiculopathy     CHF (congestive heart failure) (HCC)     Chronic low back pain     Chronic obstructive asthma 2018    Cigarette nicotine dependence without complication 2019    Quit 12/10/2019.     Community acquired pneumonia 2020    COPD exacerbation (HCC) 2020    Depression with anxiety 2014    Diabetes mellitus with hyperglycemia (HCC)     Diverticulitis 2021    Elevated liver enzymes     GERD (gastroesophageal reflux disease)     Hypersomnolence 10/28/2020    Hypokalemia 2018    Lower gastrointestinal bleeding 2021    Paresthesia of upper extremity     Plantar fasciitis     Restless legs syndrome 2014    Severe persistent asthma with exacerbation 2018    PFTs 2018:  FEV1 0.87 L-35% predicted, 27% improvement post-bronchodilator.  DLCO-82% predicted,  The patient has been having worsening of her symptoms now for few months, especially  from 2020, also she had multiple exacerbations last year and most recently required a steroid burst and August  Reviewing her CT scan  New PFTs with severe obstruction reviewed  Hypersensitivity p    Sexual dysfunction     Sleep apnea, obstructive     Tenosynovitis of finger     Tinea corporis     Tobacco use 2018    Currently smoking 3-4 cigarettes daily    Trigger middle finger of left hand 2017    Trigger ring finger of left hand 2017    Weakness of upper extremity      Social History     Socioeconomic History    Marital status: Significant Other     Spouse name: Not on file    Number of children: Not on file    Years of education: Not on file    Highest education level: Not on file   Occupational History    Not on file   Tobacco Use    Smoking status: Former     Current packs/day: 0.00     Average packs/day: 0.3 packs/day for 29.9 years (7.5 ttl pk-yrs)     Types: Cigarettes     Start date:      Quit date: 12/10/2019     Years since quittin.5     Smokeless tobacco: Never   Vaping Use    Vaping status: Never Used   Substance and Sexual Activity    Alcohol use: Not Currently     Alcohol/week: 20.0 standard drinks of alcohol     Types: 20 Cans of beer per week     Comment: about 6 coors light every night, stopped in 2019    Drug use: No    Sexual activity: Not Currently   Other Topics Concern    Not on file   Social History Narrative    Not on file     Social Determinants of Health     Financial Resource Strain: Not on file   Food Insecurity: No Food Insecurity (2024)    Hunger Vital Sign     Worried About Running Out of Food in the Last Year: Never true     Ran Out of Food in the Last Year: Never true   Transportation Needs: No Transportation Needs (2024)    PRAPARE - Transportation     Lack of Transportation (Medical): No     Lack of Transportation (Non-Medical): No   Physical Activity: Not on file   Stress: Not on file   Social Connections: Not on file   Intimate Partner Violence: Not on file   Housing Stability: Low Risk  (2024)    Housing Stability Vital Sign     Unable to Pay for Housing in the Last Year: No     Number of Times Moved in the Last Year: 1     Homeless in the Last Year: No      Family History   Problem Relation Age of Onset    Alzheimer's disease Mother     Atrial fibrillation Mother     Dementia Mother     Heart disease Mother         heart problem    Seizures Mother     Parkinsonism Father     Arthritis Father     Atrial fibrillation Father     No Known Problems Daughter     Cri-du-chat syndrome Daughter     Colon cancer Maternal Grandmother 80    Diabetes type II Maternal Grandmother     No Known Problems Brother     No Known Problems Son     Substance Abuse Neg Hx         mother,father    Mental illness Neg Hx         mother,father    Colon polyps Neg Hx      Past Surgical History:   Procedure Laterality Date    ABDOMINAL SURGERY      CARPAL TUNNEL RELEASE Bilateral      SECTION      CHOLECYSTECTOMY       laparoscopic    ESOPHAGOGASTRODUODENOSCOPY N/A 12/3/2018    Procedure: ESOPHAGOGASTRODUODENOSCOPY (EGD) AND COLONOSCOPY;  Surgeon: Ludmila Perry MD;  Location: [facility];  Service: Gastroenterology    GASTRIC BYPASS      for morbid obesity with gilda en y    HERNIA REPAIR      HYSTERECTOMY      PA EXCISION GANGLION WRIST DORSAL/VOLAR PRIMARY Left 12/14/2017    Procedure: LONG FINGER GANGLION CYST EXCISION;  Surgeon: Philippe Clark MD;  Location: [facility];  Service: Orthopedics    PA TENDON SHEATH INCISION Left 12/14/2017    Procedure: THUMB, LONG, RING, TRIGGER FINGER RELEASE;  Surgeon: Philippe Clark MD;  Location: [facility];  Service: Orthopedics    SHOULDER SURGERY Right        Current Outpatient Medications:     acetaminophen (TYLENOL) 650 mg CR tablet, Take 650 mg by mouth every 8 (eight) hours as needed for mild pain, Disp: , Rfl:     albuterol (2.5 mg/3 mL) 0.083 % nebulizer solution, Take 3 mL (2.5 mg total) by nebulization every 6 (six) hours as needed for wheezing or shortness of breath, Disp: 1080 mL, Rfl: 3    albuterol (PROVENTIL HFA,VENTOLIN HFA) 90 mcg/act inhaler, Inhale 2 puffs every 4 (four) hours as needed for wheezing, Disp: 8.5 g, Rfl: 5    apixaban (Eliquis) 5 mg, Take 1 tablet (5 mg total) by mouth 2 (two) times a day, Disp: 60 tablet, Rfl: 2    atorvastatin (LIPITOR) 40 mg tablet, Take 1 tablet (40 mg total) by mouth daily, Disp: 90 tablet, Rfl: 3    Blood Glucose Monitoring Suppl (Contour Next One) AILYN, USE AS DIRECTED 3 TIMES A DAY (Patient taking differently: USE AS DIRECTED 3 TIMES A DAY  Once a day), Disp: 1 each, Rfl: 0    budesonide (PULMICORT) 0.5 mg/2 mL nebulizer solution, Take 2 mL (0.5 mg total) by nebulization 2 (two) times a day Rinse mouth after use., Disp: 60 mL, Rfl: 3    bumetanide (BUMEX) 2 mg tablet, Take 3 tablets (6 mg total) by mouth 2 (two) times a day, Disp: 540 tablet, Rfl: 1    Contour Next Test test strip, USE TO TEST BLOOD SUGAR 3 TIMES A DAY (Patient  taking differently: USE TO TEST BLOOD SUGAR 3 TIMES A DAY  Once a day), Disp: 100 strip, Rfl: 3    [employer] Lancets Thin 26G MISC, USE 3 (THREE) TIMES A DAY (Patient taking differently: Once a day), Disp: 100 each, Rfl: 5    dapagliflozin (Farxiga) 10 MG tablet, Take 1 tablet (10 mg total) by mouth daily Do not start before March 31, 2024., Disp: , Rfl:     diltiazem (CARDIZEM CD) 120 mg 24 hr capsule, Take 1 capsule (120 mg total) by mouth daily, Disp: 90 capsule, Rfl: 1    DULoxetine (CYMBALTA) 60 mg delayed release capsule, Take 60 mg by mouth daily, Disp: , Rfl:     EPINEPHrine (EPIPEN) 0.3 mg/0.3 mL SOAJ, Inject 0.3 mL (0.3 mg total) into a muscle once for 1 dose, Disp: 0.6 mL, Rfl: 0    fluticasone (FLONASE) 50 mcg/act nasal spray, 1 spray into each nostril 2 (two) times a day, Disp: 8 g, Rfl: 0    gabapentin (NEURONTIN) 300 mg capsule, Take 300 mg by mouth daily Pt reports taking 300 mg TID, Disp: , Rfl:     HumaLOG KwikPen 100 units/mL injection pen, Inject 15 Units under the skin 3 (three) times a day with meals, Disp: , Rfl:     Insulin Glargine Solostar (Lantus SoloStar) 100 UNIT/ML SOPN, Inject 0.42 mL (42 Units total) as directed daily at bedtime Inject 45 units daily at bedtime, Disp: 37.8 mL, Rfl: 1    insulin glulisine (Apidra SoloStar) 100 units/mL injection pen, Inject 15 Units under the skin 3 (three) times a day with meals 15 units 3 times a day with meals (Patient not taking: Reported on 6/17/2024), Disp: 15 mL, Rfl: 6    Insulin Lispro-aabc, 1 U Dial, (Lyumjev KwikPen) 100 UNIT/ML SOPN, Inject 15 Units under the skin 3 (three) times a day before meals (Patient not taking: Reported on 6/17/2024), Disp: 30 mL, Rfl: 3    Insulin Pen Needle (BD Pen Needle Love 2nd Gen) 32G X 4 MM MISC, Use daily at bedtime Use 4 new needles daily ., Disp: 400 each, Rfl: 3    levalbuterol (XOPENEX) 1.25 mg/0.5 mL nebulizer solution, Take 0.5 mL (1.25 mg total) by nebulization 2 (two) times a day, Disp: 60 vial, Rfl: 0     loratadine (CLARITIN) 10 mg tablet, Take 1 tablet (10 mg total) by mouth daily as needed for allergies, Disp: 30 tablet, Rfl: 0    LORazepam (ATIVAN) 0.5 mg tablet, TAKE 2 TABLETS BY MOUTH AT BEDTIME AND 1 EVERY 6 HOURS AS NEEDED FOR ANXIETY, Disp: 90 tablet, Rfl: 0    metoprolol succinate (TOPROL-XL) 100 mg 24 hr tablet, Take 1 tablet (100 mg total) by mouth daily, Disp: 90 tablet, Rfl: 3    montelukast (SINGULAIR) 10 mg tablet, Take 1 tablet (10 mg total) by mouth daily at bedtime, Disp: 90 tablet, Rfl: 3    naloxone (NARCAN) 4 mg/0.1 mL nasal spray, Administer 1 spray into a nostril. If breathing does not return to normal or if breathing difficulty resumes after 2-3 minutes, give another dose in the other nostril using a new spray., Disp: 1 each, Rfl: 1    nystatin (MYCOSTATIN) powder, Apply topically 2 (two) times a day, Disp: 30 g, Rfl: 0    omeprazole (PriLOSEC) 40 MG capsule, Take 1 capsule (40 mg total) by mouth daily, Disp: 90 capsule, Rfl: 3    potassium chloride (Klor-Con M20) 20 mEq tablet, Take 2 tablets (40 mEq total) by mouth daily Do not start before March 31, 2024., Disp: , Rfl:     semaglutide, 0.25 or 0.5 mg/dose, (Ozempic, 0.25 or 0.5 MG/DOSE,) 2 mg/3 mL injection pen, Inject 0.75 mL (0.5 mg total) under the skin every 7 days, Disp: 9 mL, Rfl: 1    spironolactone (ALDACTONE) 50 mg tablet, Take 2 tablets (100 mg total) by mouth daily, Disp: 180 tablet, Rfl: 1    tiotropium-olodaterol (Stiolto Respimat) 2.5-2.5 MCG/ACT inhaler, Inhale 2 puffs daily, Disp: 12 g, Rfl: 5    traZODone (DESYREL) 150 mg tablet, Take 1 tablet (150 mg total) by mouth daily at bedtime, Disp: 30 tablet, Rfl: 5    Vitamin D, Cholecalciferol, 50 MCG (2000 UT) CAPS, Take 2,000 Int'l Units/day by mouth in the morning, Disp: 90 capsule, Rfl: 3      Review of Systems:  Review of Systems   Constitutional:  Negative for chills and fever.   HENT:  Negative for ear pain and sore throat.    Eyes:  Negative for pain and visual  disturbance.   Respiratory:  Negative for cough and shortness of breath.    Cardiovascular:  Negative for chest pain and palpitations.   Gastrointestinal:  Negative for abdominal pain and vomiting.   Genitourinary:  Negative for dysuria and hematuria.   Musculoskeletal:  Negative for arthralgias and back pain.   Skin:  Negative for color change and rash.   Neurological:  Negative for seizures and syncope.   All other systems reviewed and are negative.        Physical Exam:  Physical Exam  Constitutional:       General: She is not in acute distress.     Appearance: Normal appearance. She is not ill-appearing.   HENT:      Head: Normocephalic and atraumatic.      Right Ear: External ear normal.      Left Ear: External ear normal.      Mouth/Throat:      Pharynx: No oropharyngeal exudate or posterior oropharyngeal erythema.   Eyes:      General:         Right eye: No discharge.         Left eye: No discharge.      Conjunctiva/sclera: Conjunctivae normal.      Pupils: Pupils are equal, round, and reactive to light.   Cardiovascular:      Rate and Rhythm: Normal rate. Rhythm irregular.      Pulses: Normal pulses.      Heart sounds: Normal heart sounds. No murmur heard.     No friction rub. No gallop.   Pulmonary:      Effort: Pulmonary effort is normal.      Breath sounds: Normal breath sounds. No wheezing, rhonchi or rales.   Abdominal:      General: Abdomen is flat. There is no distension.      Palpations: Abdomen is soft.      Tenderness: There is no abdominal tenderness.   Musculoskeletal:         General: No swelling, tenderness or deformity. Normal range of motion.      Cervical back: Normal range of motion.   Skin:     General: Skin is warm and dry.   Neurological:      General: No focal deficit present.      Mental Status: She is alert and oriented to person, place, and time.         This note was completed in part utilizing [employer] Direct Software.  Grammatical errors, random word insertions, spelling  mistakes, and incomplete sentences can be an occasional consequence of this system secondary to software limitations, ambient noise, and hardware issues.  If you have any questions or concerns about the content, text, or information contained within the body of this dictation, please contact the provider for clarification.

## 2024-06-17 NOTE — PROGRESS NOTES
Mattie Joy 63 y.o. female MRN: [id number]    Encounter: [id number]      Assessment & Plan     Assessment:  This is a 63 y.o.-year-old female with type 2 diabetes with neuropathy and hyperlipidemia.    Plan:  1. Type 2 diabetes, insulin requiring: Most recent hemoglobin A1c was 7.7.  Hemoglobin A1c continues to improve.  Is doing well on Ozempic, but may be having some slight GI symptoms.  At this time I am fine with increasing her Ozempic to 1 mg weekly to see if this will help with blood sugars and with weight loss.  If GI symptoms get worse, please contact the office and we will just make adjustments to the insulin.  She will continue with Lantus 42 units daily, Humalog 15 units with meals and Farxiga 10 mg daily.  Continue utilizing Dexcom to monitor glucose levels.  If there is any concerns, please contact the office so we can download and make adjustments to medication.  Follow-up in 3 months with lab work completed prior to visit.     Patient is type 2 diabetic currently utilizing 4 or more insulin injections a day.  She is checking her blood sugar for more times a day, and adjusting insulin doses according to current glucose levels.  Because of this, it would be beneficial for her to continue utilizing a Dexcom continuous glucose monitor.     2. Diabetic neuropathy:  Stable.  Diabetic foot exam is up-to-date..     3. History of stage 2 chronic kidney disease:  Kidney functions stable.  Chlorides are low but stable.  Possibly due to COPD.     4. Hyperlipidemia:  Most recent lipid panel looked excellent from December 2023.  Continue with current medication.  We will continue to monitor over time.    CC: Type 2 diabetes follow-up    History of Present Illness     HPI:  Mattie Joy is a 62 year old female with type 2 diabetes, insulin requiring for 8 year, hyperlipidemia for follow-up.   She was placed on insulin in June 2021 when her hemoglobin A1c went up markedly.  She reportedly did try Januvia and  Jardiance in the past but had problems with side effects on these medications. She is on oral agents and insulin at home and takes Lantus insulin 42 units at bedtime, Humalog 15 units with meals, Ozempic 0.5 mg weekly, and Farxiga 10 mg daily.  Previously on metformin.  She admits to polyuria, polydipsia, polyphagia, nocturia 3-4 times a night, and blurry vision.  She has unusual sensations of her feet with painful sensations of her feet.  She denies chest pain but has shortness of breath with her lung disease.  She is on oxygen 24 hours a day. She denies retinopathy, heart attack, stroke and claudication but does admit to neuropathy and nephropathy.  Is doing well today.  No hospitalizations since last office visit.  Overall states that she is doing well.  Does admit to some poor dietary choices but can only tolerate certain foods with her GI symptoms.     Has followed up with [facility] November 2021.     Hypoglycemic episodes: No never.  H/o of hypoglycemia causing hospitalization or intervention such as glucagon injection  or ambulance call  No.  Hypoglycemia symptoms: never had one.  Treatment of hypoglycemia: would eat something sweet or sugar.  Glucagon:No.  Medic alert tag: recommended,Yes.      The patient's last eye exam was at May 2024.  The patient's last foot exam was in with podiatry, Dr. Blake Malik.  She last saw Podiatry several weeks ago and does every 3 months.  Last diabetic foot exam completed in the office was September 2023.     Blood Sugar/Glucometer/Pump/CGM review: Download of Dexcom from June 4 through June 17, 2024 reveals an average glucose level of 195 with a standard deviation of 62.  She is in target range 51% time, above target range 49% time.  Glucose levels overnight are typically averaging around 150, increase significantly with breakfast, and then slowly decreased throughout the rest of the day with occasional spikes in blood sugars with meals.     She has hyperlipidemia  and takes atorvastatin 40 mg daily.  She denies chest pain but has rare palpitations.     Review of Systems   Constitutional:  Positive for fatigue. Negative for activity change, appetite change and unexpected weight change.   HENT:  Negative for sore throat and trouble swallowing.    Eyes:  Negative for visual disturbance.   Respiratory:  Positive for shortness of breath (On portable oxygen). Negative for chest tightness.    Cardiovascular:  Negative for chest pain, palpitations and leg swelling.   Gastrointestinal:  Negative for abdominal pain, constipation, diarrhea, nausea and vomiting.   Endocrine: Positive for polydipsia, polyphagia and polyuria. Negative for cold intolerance and heat intolerance.   Genitourinary:  Negative for frequency.        Nocturia   Musculoskeletal:  Positive for gait problem (Utilizes wheelchair).   Skin:  Negative for wound.   Neurological:  Positive for numbness ( bilateral feet). Negative for dizziness, weakness and headaches.   Psychiatric/Behavioral:  Positive for dysphoric mood and sleep disturbance. The patient is not nervous/anxious.        Historical Information   Past Medical History:   Diagnosis Date   • Acute blood loss anemia 06/01/2021   • Acute diverticulitis 05/02/2021   • Acute on chronic diastolic congestive heart failure (HCC) 05/21/2020   • Acute on chronic respiratory failure with hypoxia ([facility]) 11/30/2018   • Alcohol abuse 05/21/2020   • Anemia    • Asthma with COPD with exacerbation  ([facility]) 11/12/2020   • Atrial fibrillation ([facility])    • Cervical radiculopathy    • CHF (congestive heart failure) ([facility])    • Chronic low back pain    • Chronic obstructive asthma 02/20/2018   • Cigarette nicotine dependence without complication 11/20/2019    Quit 12/10/2019.    • Community acquired pneumonia 05/21/2020   • COPD exacerbation ([facility]) 09/16/2020   • Depression with anxiety 03/09/2014   • Diabetes mellitus with hyperglycemia ([facility])    • Diverticulitis 06/06/2021   • Elevated  liver enzymes    • GERD (gastroesophageal reflux disease)    • Hypersomnolence 10/28/2020   • Hypokalemia 2018   • Lower gastrointestinal bleeding 2021   • Paresthesia of upper extremity    • Plantar fasciitis    • Restless legs syndrome 2014   • Severe persistent asthma with exacerbation 2018    PFTs 2018:  FEV1 0.87 L-35% predicted, 27% improvement post-bronchodilator.  DLCO-82% predicted,  The patient has been having worsening of her symptoms now for few months, especially  from 2020, also she had multiple exacerbations last year and most recently required a steroid burst and August  Reviewing her CT scan  New PFTs with severe obstruction reviewed  Hypersensitivity p   • Sexual dysfunction    • Sleep apnea, obstructive    • Tenosynovitis of finger    • Tinea corporis    • Tobacco use 2018    Currently smoking 3-4 cigarettes daily   • Trigger middle finger of left hand 2017   • Trigger ring finger of left hand 2017   • Weakness of upper extremity      Past Surgical History:   Procedure Laterality Date   • ABDOMINAL SURGERY     • CARPAL TUNNEL RELEASE Bilateral    •  SECTION     • CHOLECYSTECTOMY      laparoscopic   • ESOPHAGOGASTRODUODENOSCOPY N/A 12/3/2018    Procedure: ESOPHAGOGASTRODUODENOSCOPY (EGD) AND COLONOSCOPY;  Surgeon: Ludmila Perry MD;  Location: [facility];  Service: Gastroenterology   • GASTRIC BYPASS      for morbid obesity with gilda en y   • HERNIA REPAIR     • HYSTERECTOMY     • RI EXCISION GANGLION WRIST DORSAL/VOLAR PRIMARY Left 2017    Procedure: LONG FINGER GANGLION CYST EXCISION;  Surgeon: Philippe Clark MD;  Location: [facility];  Service: Orthopedics   • RI TENDON SHEATH INCISION Left 2017    Procedure: THUMB, LONG, RING, TRIGGER FINGER RELEASE;  Surgeon: Philippe Clark MD;  Location: [facility];  Service: Orthopedics   • SHOULDER SURGERY Right      Social History   Social History     Substance and  Sexual Activity   Alcohol Use Not Currently   • Alcohol/week: 20.0 standard drinks of alcohol   • Types: 20 Cans of beer per week    Comment: about 6 coors light every night, stopped in 2019     Social History     Substance and Sexual Activity   Drug Use No     Social History     Tobacco Use   Smoking Status Former   • Current packs/day: 0.00   • Average packs/day: 0.3 packs/day for 29.9 years (7.5 ttl pk-yrs)   • Types: Cigarettes   • Start date:    • Quit date: 12/10/2019   • Years since quittin.5   Smokeless Tobacco Never     Family History:   Family History   Problem Relation Age of Onset   • Alzheimer's disease Mother    • Atrial fibrillation Mother    • Dementia Mother    • Heart disease Mother         heart problem   • Seizures Mother    • Parkinsonism Father    • Arthritis Father    • Atrial fibrillation Father    • No Known Problems Daughter    • Cri-du-chat syndrome Daughter    • Colon cancer Maternal Grandmother 80   • Diabetes type II Maternal Grandmother    • No Known Problems Brother    • No Known Problems Son    • Substance Abuse Neg Hx         mother,father   • Mental illness Neg Hx         mother,father   • Colon polyps Neg Hx        Meds/Allergies   Current Outpatient Medications   Medication Sig Dispense Refill   • acetaminophen (TYLENOL) 650 mg CR tablet Take 650 mg by mouth every 8 (eight) hours as needed for mild pain     • albuterol (2.5 mg/3 mL) 0.083 % nebulizer solution Take 3 mL (2.5 mg total) by nebulization every 6 (six) hours as needed for wheezing or shortness of breath 1080 mL 3   • albuterol (PROVENTIL HFA,VENTOLIN HFA) 90 mcg/act inhaler Inhale 2 puffs every 4 (four) hours as needed for wheezing 8.5 g 5   • apixaban (Eliquis) 5 mg Take 1 tablet (5 mg total) by mouth 2 (two) times a day 60 tablet 2   • atorvastatin (LIPITOR) 40 mg tablet Take 1 tablet (40 mg total) by mouth daily 90 tablet 3   • Blood Glucose Monitoring Suppl (Contour Next One) AILYN USE AS DIRECTED 3 TIMES A  DAY (Patient taking differently: USE AS DIRECTED 3 TIMES A DAY    Once a day) 1 each 0   • budesonide (PULMICORT) 0.5 mg/2 mL nebulizer solution Take 2 mL (0.5 mg total) by nebulization 2 (two) times a day Rinse mouth after use. 60 mL 3   • bumetanide (BUMEX) 2 mg tablet Take 3 tablets (6 mg total) by mouth 2 (two) times a day 540 tablet 1   • Contour Next Test test strip USE TO TEST BLOOD SUGAR 3 TIMES A DAY (Patient taking differently: USE TO TEST BLOOD SUGAR 3 TIMES A DAY    Once a day) 100 strip 3   • [employer] Lancets Thin 26G MISC USE 3 (THREE) TIMES A DAY (Patient taking differently: Once a day) 100 each 5   • dapagliflozin (Farxiga) 10 MG tablet Take 1 tablet (10 mg total) by mouth daily Do not start before March 31, 2024.     • diltiazem (CARDIZEM CD) 120 mg 24 hr capsule Take 1 capsule (120 mg total) by mouth daily 90 capsule 1   • DULoxetine (CYMBALTA) 60 mg delayed release capsule Take 60 mg by mouth daily     • EPINEPHrine (EPIPEN) 0.3 mg/0.3 mL SOAJ Inject 0.3 mL (0.3 mg total) into a muscle once for 1 dose 0.6 mL 0   • fluticasone (FLONASE) 50 mcg/act nasal spray 1 spray into each nostril 2 (two) times a day 8 g 0   • gabapentin (NEURONTIN) 300 mg capsule Take 300 mg by mouth daily Pt reports taking 300 mg TID     • HumaLOG KwikPen 100 units/mL injection pen Inject 15 Units under the skin 3 (three) times a day with meals     • Insulin Glargine Solostar (Lantus SoloStar) 100 UNIT/ML SOPN Inject 0.42 mL (42 Units total) as directed daily at bedtime Inject 45 units daily at bedtime 37.8 mL 1   • Insulin Pen Needle (BD Pen Needle Love 2nd Gen) 32G X 4 MM MISC Use daily at bedtime Use 4 new needles daily . 400 each 3   • levalbuterol (XOPENEX) 1.25 mg/0.5 mL nebulizer solution Take 0.5 mL (1.25 mg total) by nebulization 2 (two) times a day 60 vial 0   • loratadine (CLARITIN) 10 mg tablet Take 1 tablet (10 mg total) by mouth daily as needed for allergies 30 tablet 0   • LORazepam (ATIVAN) 0.5 mg tablet TAKE 2  TABLETS BY MOUTH AT BEDTIME AND 1 EVERY 6 HOURS AS NEEDED FOR ANXIETY 90 tablet 0   • metoprolol succinate (TOPROL-XL) 100 mg 24 hr tablet Take 1 tablet (100 mg total) by mouth daily 90 tablet 3   • montelukast (SINGULAIR) 10 mg tablet Take 1 tablet (10 mg total) by mouth daily at bedtime 90 tablet 3   • naloxone (NARCAN) 4 mg/0.1 mL nasal spray Administer 1 spray into a nostril. If breathing does not return to normal or if breathing difficulty resumes after 2-3 minutes, give another dose in the other nostril using a new spray. 1 each 1   • nystatin (MYCOSTATIN) powder Apply topically 2 (two) times a day 30 g 0   • omeprazole (PriLOSEC) 40 MG capsule Take 1 capsule (40 mg total) by mouth daily 90 capsule 3   • potassium chloride (Klor-Con M20) 20 mEq tablet Take 2 tablets (40 mEq total) by mouth daily Do not start before March 31, 2024.     • semaglutide, 1 mg/dose, (Ozempic, 1 MG/DOSE,) 4 mg/3 mL injection pen Inject 0.75 mL (1 mg total) under the skin every 7 days 3 mL 5   • spironolactone (ALDACTONE) 50 mg tablet Take 2 tablets (100 mg total) by mouth daily 180 tablet 1   • tiotropium-olodaterol (Stiolto Respimat) 2.5-2.5 MCG/ACT inhaler Inhale 2 puffs daily 12 g 5   • traZODone (DESYREL) 150 mg tablet Take 1 tablet (150 mg total) by mouth daily at bedtime 30 tablet 5   • Vitamin D, Cholecalciferol, 50 MCG (2000 UT) CAPS Take 2,000 Int'l Units/day by mouth in the morning 90 capsule 3   • insulin glulisine (Apidra SoloStar) 100 units/mL injection pen Inject 15 Units under the skin 3 (three) times a day with meals 15 units 3 times a day with meals (Patient not taking: Reported on 6/17/2024) 15 mL 6   • Insulin Lispro-aabc, 1 U Dial, (Lyumjeemmy KwikPen) 100 UNIT/ML SOPN Inject 15 Units under the skin 3 (three) times a day before meals (Patient not taking: Reported on 6/17/2024) 30 mL 3     No current facility-administered medications for this visit.     Allergies   Allergen Reactions   • Iron Dextran Swelling   •  Januvia [Sitagliptin] Shortness Of Breath   • Jardiance [Empagliflozin] Shortness Of Breath   • Clonazepam Other (See Comments)     Unknown reaction   • Codeine Itching and Other (See Comments)     itch;mary kay morphine,takes ultram @home   • Adhesive [Medical Tape] Itching     itching   • Effexor [Venlafaxine] Itching   • Lactose - Food Allergy GI Intolerance   • Oxycodone-Acetaminophen Anxiety   • Oxycodone-Acetaminophen Itching and Rash     Other reaction(s): Other (See Comments)  (PERCOCET) MILD RASH, not allergic to Acetaminophen        Objective   Vitals: Blood pressure 124/80, pulse 100, weight 117 kg (257 lb), not currently breastfeeding.    Physical Exam  Vitals and nursing note reviewed.   Constitutional:       General: She is not in acute distress.  HENT:      Head: Normocephalic and atraumatic.   Eyes:      Pupils: Pupils are equal, round, and reactive to light.   Cardiovascular:      Rate and Rhythm: Normal rate and regular rhythm.      Heart sounds: No murmur heard.  Pulmonary:      Effort: Pulmonary effort is normal. No respiratory distress.      Breath sounds: Decreased breath sounds present. No wheezing.      Comments: On portable oxygen concentrator  Musculoskeletal:         General: No swelling.      Cervical back: Normal range of motion.   Lymphadenopathy:      Cervical: No cervical adenopathy.   Neurological:      Mental Status: She is alert and oriented to person, place, and time.      Sensory: No sensory deficit.      Gait: Gait abnormal (Utilizing wheelchair).   Psychiatric:         Mood and Affect: Mood normal.         Behavior: Behavior normal.         Thought Content: Thought content normal.         The history was obtained from the review of the chart, patient.    Lab Results:   Lab Results   Component Value Date/Time    Hemoglobin A1C 7.7 (A) 06/17/2024 01:26 PM    Hemoglobin A1C 8.5 (H) 01/22/2024 03:34 PM    Hemoglobin A1C 9.5 (H) 12/28/2023 11:17 AM    Hemoglobin A1C 10.7 (A) 09/20/2023  02:46 PM    WBC 11.73 (H) 03/10/2024 04:25 AM    WBC 14.53 (H) 03/09/2024 05:07 AM    WBC 16.55 (H) 03/08/2024 02:48 PM    Hemoglobin 11.1 (L) 03/10/2024 04:25 AM    Hemoglobin 11.5 03/09/2024 05:07 AM    Hemoglobin 11.0 (L) 03/08/2024 02:48 PM    Hematocrit 39.8 03/10/2024 04:25 AM    Hematocrit 40.5 03/09/2024 05:07 AM    Hematocrit 39.2 03/08/2024 02:48 PM    MCV 67 (L) 03/10/2024 04:25 AM    MCV 66 (L) 03/09/2024 05:07 AM    MCV 66 (L) 03/08/2024 02:48 PM    Platelets 328 03/10/2024 04:25 AM    Platelets 362 03/09/2024 05:07 AM    Platelets 362 03/08/2024 02:48 PM    BUN 27 05/23/2024 01:15 PM    BUN 24 05/13/2024 12:58 PM    BUN 24 03/10/2024 04:25 AM    BUN 25 03/09/2024 05:07 AM    BUN 26 (H) 03/08/2024 02:48 PM    BUN 13 12/28/2023 11:17 AM    Potassium 5.1 05/23/2024 01:15 PM    Potassium 5.6 (H) 05/13/2024 12:58 PM    Potassium 4.0 03/10/2024 04:25 AM    Potassium 4.1 03/09/2024 05:07 AM    Potassium 4.7 03/08/2024 02:48 PM    Potassium 3.9 12/28/2023 11:17 AM    Chloride 90 (L) 05/23/2024 01:15 PM    Chloride 85 (L) 05/13/2024 12:58 PM    Chloride 91 (L) 03/10/2024 04:25 AM    Chloride 91 (L) 03/09/2024 05:07 AM    Chloride 90 (L) 03/08/2024 02:48 PM    Chloride 92 (L) 12/28/2023 11:17 AM    CO2 24 05/23/2024 01:15 PM    CO2 27 05/13/2024 12:58 PM    CO2 35 (H) 03/10/2024 04:25 AM    CO2 31 03/09/2024 05:07 AM    CO2 33 (H) 03/08/2024 02:48 PM    CO2 29 12/28/2023 11:17 AM    CO2, i-STAT 33 (H) 02/26/2024 02:23 PM    CO2, i-STAT 39 (H) 01/22/2024 03:39 PM    Creatinine 1.37 (H) 05/23/2024 01:15 PM    Creatinine 1.06 (H) 05/13/2024 12:58 PM    Creatinine 0.73 03/10/2024 04:25 AM    Creatinine 0.74 03/09/2024 05:07 AM    Creatinine 0.74 03/08/2024 02:48 PM    AST 18 05/23/2024 01:15 PM    AST 22 03/08/2024 02:48 PM    AST 15 03/04/2024 04:13 AM    AST 20 02/26/2024 02:08 PM    AST 21 12/28/2023 11:17 AM    ALT 22 05/23/2024 01:15 PM    ALT 13 03/08/2024 02:48 PM    ALT 11 03/04/2024 04:13 AM    ALT 26  "02/26/2024 02:08 PM    ALT 23 12/28/2023 11:17 AM    Total Protein 7.1 03/08/2024 02:48 PM    Total Protein 6.8 03/04/2024 04:13 AM    Total Protein 6.9 02/26/2024 02:08 PM    Protein, Total 6.3 05/23/2024 01:15 PM    Protein, Total 6.3 12/28/2023 11:17 AM    Albumin 4.2 05/23/2024 01:15 PM    Albumin 4.2 03/08/2024 02:48 PM    Albumin 4.1 03/04/2024 04:13 AM    Albumin 4.1 02/26/2024 02:08 PM    Globulin, Total 2.1 05/23/2024 01:15 PM    Globulin, Total 2.1 12/28/2023 11:17 AM    HDL 45 12/28/2023 11:17 AM    Triglycerides 76 12/28/2023 11:17 AM         Portions of the record may have been created with voice recognition software. Occasional wrong word or \"sound a like\" substitutions may have occurred due to the inherent limitations of voice recognition software. Read the chart carefully and recognize, using context, where substitutions have occurred.    "

## 2024-06-25 ENCOUNTER — OFFICE VISIT (OUTPATIENT)
Dept: CARDIOLOGY CLINIC | Facility: CLINIC | Age: 64
End: 2024-06-25
Payer: MEDICARE

## 2024-06-25 VITALS
WEIGHT: 253 LBS | HEART RATE: 88 BPM | BODY MASS INDEX: 44.83 KG/M2 | SYSTOLIC BLOOD PRESSURE: 124 MMHG | DIASTOLIC BLOOD PRESSURE: 86 MMHG | HEIGHT: 63 IN

## 2024-06-25 DIAGNOSIS — I48.11 LONGSTANDING PERSISTENT ATRIAL FIBRILLATION (HCC): ICD-10-CM

## 2024-06-25 DIAGNOSIS — I50.32 CHRONIC DIASTOLIC CHF (CONGESTIVE HEART FAILURE) (HCC): Primary | ICD-10-CM

## 2024-06-25 PROCEDURE — 99214 OFFICE O/P EST MOD 30 MIN: CPT | Performed by: PHYSICIAN ASSISTANT

## 2024-06-25 RX ORDER — PEN NEEDLE, DIABETIC 32GX 5/32"
NEEDLE, DISPOSABLE MISCELLANEOUS
COMMUNITY
Start: 2024-05-28

## 2024-06-25 NOTE — PATIENT INSTRUCTIONS
No changes to medications today, please continue everything the same. If you need refills of your cardiac medications prescribed by our office, please call us ahead of time so that they can be filled.   We will see you back in the office in 1 month. If you have any questions or concerns in the mean time please do not hesitate to call our office.

## 2024-07-05 DIAGNOSIS — G47.00 INSOMNIA, UNSPECIFIED TYPE: ICD-10-CM

## 2024-07-05 DIAGNOSIS — I48.91 ATRIAL FIBRILLATION, UNSPECIFIED TYPE (HCC): ICD-10-CM

## 2024-07-05 RX ORDER — TRAZODONE HYDROCHLORIDE 150 MG/1
150 TABLET ORAL
Qty: 30 TABLET | Refills: 5 | OUTPATIENT
Start: 2024-07-05

## 2024-07-05 NOTE — TELEPHONE ENCOUNTER
Reason for call:   [x] Refill   [] Prior Auth  [] Other:     Office:   [] PCP/Provider -   [x] Specialty/Provider -Jovanni Pacheco/[facility]      Medication: Eliquis    Dose/Frequency: 5 mg     Quantity: #60    Pharmacy: [facility]    Does the patient have enough for 3 days?   [] Yes   [x] No - Send as HP to POD

## 2024-07-05 NOTE — TELEPHONE ENCOUNTER
No refills left on the bottle for Trazodone. Told patient that there were refills on it and insisted that I put a refill request in for it.      Reason for call:   [x] Refill   [] Prior Auth  [] Other:     Office:   [x] PCP/Provider - Laith Olivares/[facility] [address]   [] Specialty/Provider -     Medication: Trazodone    Dose/Frequency: 150 mg tablet    Quantity: #30    Pharmacy: [facility]    Does the patient have enough for 3 days?   [] Yes   [x] No - Send as HP to POD

## 2024-07-08 NOTE — PLAN OF CARE
Problem: MOBILITY - ADULT  Goal: Maintain or return to baseline ADL function  Description: INTERVENTIONS:  -  Assess patient's ability to carry out ADLs; assess patient's baseline for ADL function and identify physical deficits which impact ability to perform ADLs (bathing, care of mouth/teeth, toileting, grooming, dressing, etc.)  - Assess/evaluate cause of self-care deficits   - Assess range of motion  - Assess patient's mobility; develop plan if impaired  - Assess patient's need for assistive devices and provide as appropriate  - Encourage maximum independence but intervene and supervise when necessary  - Involve family in performance of ADLs  - Assess for home care needs following discharge   - Consider OT consult to assist with ADL evaluation and planning for discharge  - Provide patient education as appropriate  Outcome: Progressing  Goal: Maintains/Returns to pre admission functional level  Description: INTERVENTIONS:  - Perform BMAT or MOVE assessment daily.   - Set and communicate daily mobility goal to care team and patient/family/caregiver. - Collaborate with rehabilitation services on mobility goals if consulted  - Perform Range of Motion 3 times a day. - Reposition patient every 2 hours.   - Dangle patient 3 times a day  - Stand patient 3 times a day  - Ambulate patient 3 times a day  - Out of bed to chair 3 times a day   - Out of bed for meals 3 times a day  - Out of bed for toileting  - Record patient progress and toleration of activity level   Outcome: Progressing     Problem: PAIN - ADULT  Goal: Verbalizes/displays adequate comfort level or baseline comfort level  Description: Interventions:  - Encourage patient to monitor pain and request assistance  - Assess pain using appropriate pain scale  - Administer analgesics based on type and severity of pain and evaluate response  - Implement non-pharmacological measures as appropriate and evaluate response  - Consider cultural and social influences on pain and pain management  - Notify physician/advanced practitioner if interventions unsuccessful or patient reports new pain  Outcome: Progressing     Problem: INFECTION - ADULT  Goal: Absence or prevention of progression during hospitalization  Description: INTERVENTIONS:  - Assess and monitor for signs and symptoms of infection  - Monitor lab/diagnostic results  - Monitor all insertion sites, i.e. indwelling lines, tubes, and drains  - Monitor endotracheal if appropriate and nasal secretions for changes in amount and color  - [place] appropriate cooling/warming therapies per order  - Administer medications as ordered  - Instruct and encourage patient and family to use good hand hygiene technique  - Identify and instruct in appropriate isolation precautions for identified infection/condition  Outcome: Progressing  Goal: Absence of fever/infection during neutropenic period  Description: INTERVENTIONS:  - Monitor WBC    Outcome: Progressing     Problem: SAFETY ADULT  Goal: Maintain or return to baseline ADL function  Description: INTERVENTIONS:  -  Assess patient's ability to carry out ADLs; assess patient's baseline for ADL function and identify physical deficits which impact ability to perform ADLs (bathing, care of mouth/teeth, toileting, grooming, dressing, etc.)  - Assess/evaluate cause of self-care deficits   - Assess range of motion  - Assess patient's mobility; develop plan if impaired  - Assess patient's need for assistive devices and provide as appropriate  - Encourage maximum independence but intervene and supervise when necessary  - Involve family in performance of ADLs  - Assess for home care needs following discharge   - Consider OT consult to assist with ADL evaluation and planning for discharge  - Provide patient education as appropriate  Outcome: Progressing  Goal: Maintains/Returns to pre admission functional level  Description: INTERVENTIONS:  - Perform BMAT or MOVE assessment daily.   - Set and communicate daily mobility goal to care team and patient/family/caregiver. - Collaborate with rehabilitation services on mobility goals if consulted  - Perform Range of Motion 3 times a day. - Reposition patient every 2 hours.   - Dangle patient 3 times a day  - Stand patient 3 times a day  - Ambulate patient 3 times a day  - Out of bed to chair 3 times a day   - Out of bed for meals 3 times a day  - Out of bed for toileting  - Record patient progress and toleration of activity level   Outcome: Progressing  Goal: Patient will remain free of falls  Description: INTERVENTIONS:  - Educate patient/family on patient safety including physical limitations  - Instruct patient to call for assistance with activity   - Consult OT/PT to assist with strengthening/mobility   - Keep Call bell within reach  - Keep bed low and locked with side rails adjusted as appropriate  - Keep care items and personal belongings within reach  - Initiate and maintain comfort rounds  - Make Fall Risk Sign visible to staff  - Offer Toileting every 2 Hours, in advance of need  - Initiate/Maintain bed/ chair alarm  - Obtain necessary fall risk management equipment: n/a  - Apply yellow socks and bracelet for high fall risk patients  - Consider moving patient to room near nurses station  Outcome: Progressing     Problem: DISCHARGE PLANNING  Goal: Discharge to home or other facility with appropriate resources  Description: INTERVENTIONS:  - Identify barriers to discharge w/patient and caregiver  - Arrange for needed discharge resources and transportation as appropriate  - Identify discharge learning needs (meds, wound care, etc.)  - Arrange for interpretive services to assist at discharge as needed  - Refer to Case Management Department for coordinating discharge planning if the patient needs post-hospital services based on physician/advanced practitioner order or complex needs related to functional status, cognitive ability, or social support system  Outcome: Progressing     Problem: Knowledge Deficit  Goal: Patient/family/caregiver demonstrates understanding of disease process, treatment plan, medications, and discharge instructions  Description: Complete learning assessment and assess knowledge base.   Interventions:  - Provide teaching at level of understanding  - Provide teaching via preferred learning methods  Outcome: Progressing     Problem: RESPIRATORY - ADULT  Goal: Achieves optimal ventilation and oxygenation  Description: INTERVENTIONS:  - Assess for changes in respiratory status  - Assess for changes in mentation and behavior  - Position to facilitate oxygenation and minimize respiratory effort  - Oxygen administered by appropriate delivery if ordered  - Initiate smoking cessation education as indicated  - Encourage broncho-pulmonary hygiene including cough, deep breathe, Incentive Spirometry  - Assess the need for suctioning and aspirate as needed  - Assess and instruct to report SOB or any respiratory difficulty  - Respiratory Therapy support as indicated  Outcome: Progressing No

## 2024-07-10 NOTE — ASSESSMENT & PLAN NOTE
Problem: Chronic Conditions and Co-morbidities  Goal: Patient's chronic conditions and co-morbidity symptoms are monitored and maintained or improved  Outcome: Progressing      At baseline on 4 L nasal cannula oxygen secondary to severe COPD, pulmonary hypertension  Required up to 7 L nasal cannula oxygen on presentation  Likely decompensated secondary to CHF  Flu/COVID/RSV negative  Monitor with diuresis    Currently saturating fine at 4 L nasal cannula oxygen which is her baseline

## 2024-07-29 ENCOUNTER — OFFICE VISIT (OUTPATIENT)
Dept: CARDIOLOGY CLINIC | Facility: CLINIC | Age: 64
End: 2024-07-29
Payer: MEDICARE

## 2024-07-29 VITALS
SYSTOLIC BLOOD PRESSURE: 95 MMHG | HEART RATE: 99 BPM | DIASTOLIC BLOOD PRESSURE: 63 MMHG | WEIGHT: 255 LBS | HEIGHT: 63 IN | BODY MASS INDEX: 45.18 KG/M2

## 2024-07-29 DIAGNOSIS — I48.11 LONGSTANDING PERSISTENT ATRIAL FIBRILLATION (HCC): ICD-10-CM

## 2024-07-29 DIAGNOSIS — I50.32 CHRONIC DIASTOLIC CHF (CONGESTIVE HEART FAILURE) (HCC): Primary | ICD-10-CM

## 2024-07-29 PROCEDURE — G2211 COMPLEX E/M VISIT ADD ON: HCPCS | Performed by: INTERNAL MEDICINE

## 2024-07-29 PROCEDURE — 99214 OFFICE O/P EST MOD 30 MIN: CPT | Performed by: INTERNAL MEDICINE

## 2024-07-29 RX ORDER — DILTIAZEM HYDROCHLORIDE 120 MG/1
120 CAPSULE, COATED, EXTENDED RELEASE ORAL DAILY
Qty: 90 CAPSULE | Refills: 3 | Status: SHIPPED | OUTPATIENT
Start: 2024-07-29

## 2024-07-29 RX ORDER — BUMETANIDE 2 MG/1
4 TABLET ORAL 2 TIMES DAILY
Qty: 120 TABLET | Refills: 11 | Status: SHIPPED | OUTPATIENT
Start: 2024-07-29 | End: 2025-07-24

## 2024-07-29 RX ORDER — SPIRONOLACTONE 50 MG/1
100 TABLET, FILM COATED ORAL DAILY
Qty: 180 TABLET | Refills: 1 | Status: SHIPPED | OUTPATIENT
Start: 2024-07-29

## 2024-07-29 RX ORDER — DAPAGLIFLOZIN 10 MG/1
10 TABLET, FILM COATED ORAL DAILY
Qty: 90 TABLET | Refills: 3 | Status: SHIPPED | OUTPATIENT
Start: 2024-07-29

## 2024-07-29 NOTE — PROGRESS NOTES
Cardiology Outpatient Follow-Up Note - Mattie Joy 63 y.o. female MRN: [id number]      Assessment/Plan:    1. Chronic diastolic CHF (congestive heart failure) (HCC)  Euvolemic on exam today  Her weight is at a joe of 255 lbs. She has felt fatigued. Her BP is low. Her labs show hyponatremia and increased Cr from baseline. I suspect she is hypovolemic. We will reduce bumex from 6 mg BID to 4 mg BID. Stop potassium supplement. Repeat BMP in 2 weeks.     - bumetanide (BUMEX) 2 mg tablet; Take 2 tablets (4 mg total) by mouth 2 (two) times a day  Dispense: 120 tablet; Refill: 11  - Basic metabolic panel; Future  - Basic metabolic panel    2. Longstanding persistent atrial fibrillation (HCC)  Controlled on current therapy.       We will see Mattie Joy back in 3 months.     Subjective:     HPI: Mattie Joy is a 63 y.o. year old female who presented to me initially on 9/9/21 for further evaluation of CHF with preserved LVEF, pulmonary hypertension by echo estimation, and atrial fibrillation on flecainide therapy (since committed to permanent AF). She also has chronic hypoxic respiratory failure due to asthma COPD overlap syndrome and is followed by Pulmonology.      I referred her for RHC which was performed on 9/30/21. The study showed severely elevated right (RAP 20mmHg) and left (PCWP 30mmHg) sided filling pressure. There was severe post-capillary pulmonary hypertension (PAP 80/38/52) with a PVR of 2.7WU. Cardiac output was normal. The study was suggestive of primarily group II pulmonary hypertension due to left sided heart disease and elevated filling pressure.     She has started a special therapy for eosinophilic asthma, on injectable biologic, but reports minimal to no improvement.      Lately she has been frequently admitted to [facility] for severe decompensations of HFpEF. She was admitted for 18 days beginning 1/18/24 at which time she was diuresed with a bumex gtt from 291 lbs to 261 lbs. She was discharged on  "2/9/24 but unfortunately readmitted rather soon on 2/26/24 with decompensated CHF and a weight of 279 lbs. She was diuresed with bumex gtt and discharged 3/6/24 at 266 lbs.     Weight today 255 lbs.    Symptoms are stable. She has noticed improvement in her breathing as she is being treated for eosinophilic asthma.         Cardiac Testing:    EKGs, personally reviewed:  EKG in the office 3/29/2023 shows normal sinus rhythm, 87 bpm, normal axis, normal intervals.  Normal study.    EKG 3/8/24 - AF with RVR, 131 bpm. Abnormal study.    Relevant Labs & Results:  BMP 2/6/2023 reviewed--K4.2, creatinine 0.65--on torsemide 60 mg twice daily and potassium 20 mEq twice daily    BMP 3/6/23 - K 4.4, Cr 0.70  BNP 2/26/24 - 177  CBC 3/5/24 - Hgb 10.5 g/dL    CMP 5/23/2024-- Na 129, K5.1, creatinine 1.37, GFR 43    ROS:  Review of Systems:  Review of Systems    Objective:     Vitals:   Vitals:    07/29/24 1405   BP: 95/63   BP Location: Left arm   Patient Position: Sitting   Cuff Size: Standard   Pulse: 99   Weight: 116 kg (255 lb)   Height: 5' 3\" (1.6 m)    Body surface area is 2.15 meters squared.  Wt Readings from Last 3 Encounters:   07/29/24 116 kg (255 lb)   06/25/24 115 kg (253 lb)   06/17/24 117 kg (257 lb)       Physical Exam:  General: Mattie Joy is a chronically ill appearing and morbidly obese female in a wheelchair and on NCO2  HEENT: moist mucous membranes, EOMI  Neck:  unable to assess JVD, supple, trachea midline  Cardiovascular: irregular , normal rate, no murmur  Pulmonary: normal respiratory effort, CTAB  Abdomen: obese  Extremities: trace lower extremity edema. Warm and well perfused extremities.  Neuro: no focal motor deficits, AAOx3 (person, place, time)  Psych: Normal mood and affect, cooperative        Medications (at the START of this encounter):  Outpatient Medications Prior to Visit   Medication Sig Dispense Refill    acetaminophen (TYLENOL) 650 mg CR tablet Take 650 mg by mouth every 8 (eight) hours " as needed for mild pain      albuterol (2.5 mg/3 mL) 0.083 % nebulizer solution Take 3 mL (2.5 mg total) by nebulization every 6 (six) hours as needed for wheezing or shortness of breath 1080 mL 3    albuterol (PROVENTIL HFA,VENTOLIN HFA) 90 mcg/act inhaler Inhale 2 puffs every 4 (four) hours as needed for wheezing 8.5 g 5    apixaban (Eliquis) 5 mg Take 1 tablet (5 mg total) by mouth 2 (two) times a day 60 tablet 5    atorvastatin (LIPITOR) 40 mg tablet Take 1 tablet (40 mg total) by mouth daily 90 tablet 3    Blood Glucose Monitoring Suppl (Contour Next One) AILYN USE AS DIRECTED 3 TIMES A DAY (Patient taking differently: USE AS DIRECTED 3 TIMES A DAY    Once a day) 1 each 0    budesonide (PULMICORT) 0.5 mg/2 mL nebulizer solution Take 2 mL (0.5 mg total) by nebulization 2 (two) times a day Rinse mouth after use. 60 mL 3    bumetanide (BUMEX) 2 mg tablet Take 3 tablets (6 mg total) by mouth 2 (two) times a day 540 tablet 1    Contour Next Test test strip USE TO TEST BLOOD SUGAR 3 TIMES A DAY (Patient taking differently: USE TO TEST BLOOD SUGAR 3 TIMES A DAY    Once a day) 100 strip 3    [employer] Lancets Thin 26G MISC USE 3 (THREE) TIMES A DAY (Patient taking differently: Once a day) 100 each 5    dapagliflozin (Farxiga) 10 MG tablet Take 1 tablet (10 mg total) by mouth daily Do not start before March 31, 2024.      diltiazem (CARDIZEM CD) 120 mg 24 hr capsule Take 1 capsule (120 mg total) by mouth daily 90 capsule 1    DULoxetine (CYMBALTA) 60 mg delayed release capsule Take 60 mg by mouth daily      Easy Comfort Pen Needles 33G X 4 MM MISC USE FOUR new needles DAILY      EPINEPHrine (EPIPEN) 0.3 mg/0.3 mL SOAJ Inject 0.3 mL (0.3 mg total) into a muscle once for 1 dose 0.6 mL 0    fluticasone (FLONASE) 50 mcg/act nasal spray 1 spray into each nostril 2 (two) times a day 8 g 0    gabapentin (NEURONTIN) 300 mg capsule Take 300 mg by mouth daily Pt reports taking 300 mg TID      HumaLOG KwikPen 100 units/mL injection pen  Inject 15 Units under the skin 3 (three) times a day with meals      Insulin Glargine Solostar (Lantus SoloStar) 100 UNIT/ML SOPN Inject 0.42 mL (42 Units total) as directed daily at bedtime Inject 45 units daily at bedtime (Patient taking differently: Inject 43 Units as directed daily at bedtime) 37.8 mL 1    Insulin Pen Needle (BD Pen Needle Love 2nd Gen) 32G X 4 MM MISC Use daily at bedtime Use 4 new needles daily . 400 each 3    levalbuterol (XOPENEX) 1.25 mg/0.5 mL nebulizer solution Take 0.5 mL (1.25 mg total) by nebulization 2 (two) times a day 60 vial 0    loratadine (CLARITIN) 10 mg tablet Take 1 tablet (10 mg total) by mouth daily as needed for allergies 30 tablet 0    LORazepam (ATIVAN) 0.5 mg tablet TAKE 2 TABLETS BY MOUTH AT BEDTIME AND 1 EVERY 6 HOURS AS NEEDED FOR ANXIETY 90 tablet 0    metoprolol succinate (TOPROL-XL) 100 mg 24 hr tablet Take 1 tablet (100 mg total) by mouth daily 90 tablet 3    montelukast (SINGULAIR) 10 mg tablet Take 1 tablet (10 mg total) by mouth daily at bedtime 90 tablet 3    naloxone (NARCAN) 4 mg/0.1 mL nasal spray Administer 1 spray into a nostril. If breathing does not return to normal or if breathing difficulty resumes after 2-3 minutes, give another dose in the other nostril using a new spray. 1 each 1    nystatin (MYCOSTATIN) powder Apply topically 2 (two) times a day 30 g 0    omeprazole (PriLOSEC) 40 MG capsule Take 1 capsule (40 mg total) by mouth daily 90 capsule 3    potassium chloride (Klor-Con M20) 20 mEq tablet Take 2 tablets (40 mEq total) by mouth daily Do not start before March 31, 2024. (Patient taking differently: Take 40 mEq by mouth daily Pt reports taking 20 mEq daily)      semaglutide, 1 mg/dose, (Ozempic, 1 MG/DOSE,) 4 mg/3 mL injection pen Inject 0.75 mL (1 mg total) under the skin every 7 days 3 mL 5    spironolactone (ALDACTONE) 50 mg tablet Take 2 tablets (100 mg total) by mouth daily 180 tablet 1    tiotropium-olodaterol (Stiolto Respimat) 2.5-2.5  "MCG/ACT inhaler Inhale 2 puffs daily 12 g 5    traZODone (DESYREL) 150 mg tablet Take 1 tablet (150 mg total) by mouth daily at bedtime 30 tablet 5    Vitamin D, Cholecalciferol, 50 MCG (2000 UT) CAPS Take 2,000 Int'l Units/day by mouth in the morning 90 capsule 3    insulin glulisine (Apidra SoloStar) 100 units/mL injection pen Inject 15 Units under the skin 3 (three) times a day with meals 15 units 3 times a day with meals (Patient not taking: Reported on 6/25/2024) 15 mL 6    Insulin Lispro-aabc, 1 U Dial, (Lyumjev KwikPen) 100 UNIT/ML SOPN Inject 15 Units under the skin 3 (three) times a day before meals (Patient not taking: Reported on 6/17/2024) 30 mL 3     No facility-administered medications prior to visit.                 Time Spent:  Total time (face-to-face and non-face-to-face) spent on today's visit was 21 minutes. This includes preparation for the visits (i.e. reviewing test results), performance of a medically appropriate history and examination, and orders for medications, tests or other procedures. This time is exclusive of procedures performed and time spent teaching.      This note was completed in part utilizing Dragon [employer] voice recognition software. Grammatical errors, random word insertion, spelling mistakes, occasional wrong word or \"sound-alike\" substitutions and incomplete sentences may be an occasional consequence of the system secondary to software limitations, ambient noise and hardware issues. At the time of dictation, efforts were made to edit, clarify and /or correct errors.  Please read the chart carefully and recognize, using context, where substitutions have occurred.  If you have any questions or concerns about the context, text or information contained within the body of this dictation, please contact myself, the provider, for further clarification.    "

## 2024-08-06 ENCOUNTER — HOSPITAL ENCOUNTER (OUTPATIENT)
Dept: INFUSION CENTER | Facility: HOSPITAL | Age: 64
Discharge: HOME | End: 2024-08-06
Attending: INTERNAL MEDICINE
Payer: MEDICARE

## 2024-08-06 VITALS
DIASTOLIC BLOOD PRESSURE: 73 MMHG | OXYGEN SATURATION: 92 % | TEMPERATURE: 97.1 F | SYSTOLIC BLOOD PRESSURE: 95 MMHG | HEART RATE: 102 BPM | RESPIRATION RATE: 18 BRPM

## 2024-08-06 DIAGNOSIS — J44.89 ASTHMA-COPD OVERLAP SYNDROME: Primary | ICD-10-CM

## 2024-08-06 PROCEDURE — 96372 THER/PROPH/DIAG INJ SC/IM: CPT

## 2024-08-06 RX ORDER — EPINEPHRINE 1 MG/ML
0.3 INJECTION, SOLUTION, CONCENTRATE INTRAVENOUS
OUTPATIENT
Start: 2024-09-03

## 2024-08-06 RX ORDER — EPINEPHRINE 1 MG/ML
0.3 INJECTION, SOLUTION, CONCENTRATE INTRAVENOUS
Status: DISCONTINUED | OUTPATIENT
Start: 2024-08-06 | End: 2024-08-09 | Stop reason: HOSPADM

## 2024-08-06 RX ADMIN — BENRALIZUMAB 30 MG: 30 INJECTION, SOLUTION SUBCUTANEOUS at 08:38

## 2024-08-06 NOTE — PROGRESS NOTES
Mattie Joy  tolerated treatment well with no complications.      Epi pen was at patient's chairside with expiration date of 3/2025    Mattie Joy is aware of future appt on 10/1 at 1300.     AVS printed and given to Mattie Joy:    No (Declined by Mattie Joy)

## 2024-08-26 ENCOUNTER — OFFICE VISIT (OUTPATIENT)
Dept: FAMILY MEDICINE CLINIC | Facility: HOSPITAL | Age: 64
End: 2024-08-26
Payer: MEDICARE

## 2024-08-26 VITALS
DIASTOLIC BLOOD PRESSURE: 58 MMHG | HEIGHT: 63 IN | OXYGEN SATURATION: 94 % | SYSTOLIC BLOOD PRESSURE: 96 MMHG | BODY MASS INDEX: 45.79 KG/M2 | TEMPERATURE: 97.1 F | WEIGHT: 258.4 LBS | HEART RATE: 107 BPM

## 2024-08-26 DIAGNOSIS — G47.33 OBSTRUCTIVE SLEEP APNEA: ICD-10-CM

## 2024-08-26 DIAGNOSIS — I13.0 HYPERTENSIVE HEART AND CHRONIC KIDNEY DISEASE WITH HEART FAILURE AND STAGE 1 THROUGH STAGE 4 CHRONIC KIDNEY DISEASE, OR CHRONIC KIDNEY DISEASE (HCC): ICD-10-CM

## 2024-08-26 DIAGNOSIS — D64.9 ANEMIA, UNSPECIFIED TYPE: ICD-10-CM

## 2024-08-26 DIAGNOSIS — Z79.4 TYPE 2 DIABETES MELLITUS WITH STAGE 2 CHRONIC KIDNEY DISEASE, WITH LONG-TERM CURRENT USE OF INSULIN (HCC): Primary | ICD-10-CM

## 2024-08-26 DIAGNOSIS — I50.32 CHRONIC DIASTOLIC CHF (CONGESTIVE HEART FAILURE) (HCC): ICD-10-CM

## 2024-08-26 DIAGNOSIS — J44.89 ASTHMA-COPD OVERLAP SYNDROME: ICD-10-CM

## 2024-08-26 DIAGNOSIS — E78.00 HYPERCHOLESTEROLEMIA: ICD-10-CM

## 2024-08-26 DIAGNOSIS — E11.22 TYPE 2 DIABETES MELLITUS WITH STAGE 2 CHRONIC KIDNEY DISEASE, WITH LONG-TERM CURRENT USE OF INSULIN (HCC): Primary | ICD-10-CM

## 2024-08-26 DIAGNOSIS — I48.11 LONGSTANDING PERSISTENT ATRIAL FIBRILLATION (HCC): ICD-10-CM

## 2024-08-26 DIAGNOSIS — N18.2 TYPE 2 DIABETES MELLITUS WITH STAGE 2 CHRONIC KIDNEY DISEASE, WITH LONG-TERM CURRENT USE OF INSULIN (HCC): Primary | ICD-10-CM

## 2024-08-26 DIAGNOSIS — F32.1 CURRENT MODERATE EPISODE OF MAJOR DEPRESSIVE DISORDER WITHOUT PRIOR EPISODE (HCC): ICD-10-CM

## 2024-08-26 DIAGNOSIS — G25.81 RESTLESS LEG SYNDROME: ICD-10-CM

## 2024-08-26 PROCEDURE — 99215 OFFICE O/P EST HI 40 MIN: CPT | Performed by: FAMILY MEDICINE

## 2024-08-26 PROCEDURE — G2211 COMPLEX E/M VISIT ADD ON: HCPCS | Performed by: FAMILY MEDICINE

## 2024-08-26 NOTE — PROGRESS NOTES
Ambulatory Visit  Name: Mattie Joy      : 1960      MRN: [id number]  Encounter Provider: Laith Olivares MD  Encounter Date: 2024   Encounter department: [facility] [address]     Assessment & Plan   1. Type 2 diabetes mellitus with stage 2 chronic kidney disease, with long-term current use of insulin (HCC)  Assessment & Plan:    Lab Results   Component Value Date    HGBA1C 7.7 (A) 2024     Diabetes has had better control.  Due to multiple comorbidities it is very hard for her to obtain strict control of her diabetes.  She will remain on the same regimen.  She will continue following up with endocrinology.  There have been no episodes of hypoglycemia.  Orders:  -     CBC; Future  -     Comprehensive metabolic panel; Future  -     Iron; Future  -     CBC  -     Comprehensive metabolic panel  -     Iron  2. Obstructive sleep apnea  Assessment & Plan:  She continues follow-up with sleep medicine.  She is doing much better with her current set up.  3. Longstanding persistent atrial fibrillation (HCC)  Assessment & Plan:  She is currently stable.  She is on rate control and anticoagulation.    Ongoing follow-up with cardiology.  4. Hypertensive heart and chronic kidney disease with heart failure and stage 1 through stage 4 chronic kidney disease, or chronic kidney disease (HCC)  Assessment & Plan:  Wt Readings from Last 3 Encounters:   24 117 kg (258 lb 6.4 oz)   24 116 kg (255 lb)   24 115 kg (253 lb)     Continue management of her hypertension and chronic kidney disease.  She continues to follow-up with both cardiology and nephrology.  CKD 4 appears to be stable.  Continue to monitor.        Orders:  -     TSH, 3rd generation with Free T4 reflex; Future  -     TSH, 3rd generation with Free T4 reflex  5. Hypercholesterolemia  Assessment & Plan:  Cardiovascular risk is high.  She is diabetic with heart disease and chronic kidney disease.  She will  continue on statin.  6. Current moderate episode of major depressive disorder without prior episode ([facility])  Assessment & Plan:  Overall she is doing well in terms of depression.  She has no suicidal ideation or homicidal ideation.  She will continue on her same regimen.  Of note she has not needed lorazepam.  She has not had a prescription since March 2024.  She will continue to try to stay off of this.  Orders:  -     TSH, 3rd generation with Free T4 reflex; Future  -     TSH, 3rd generation with Free T4 reflex  7. Asthma-COPD overlap syndrome  Assessment & Plan:  Vicki also follows with pulmonology.  She has an asthma/COPD overlap syndrome.  She is oxygen dependent.  At this point in time she is stable.  No exacerbation.  She will continue with her same inhalers.  Continue to monitor her symptoms.  8. Chronic diastolic CHF (congestive heart failure) ([facility])  9. Restless leg syndrome  -     CBC; Future  -     Comprehensive metabolic panel; Future  -     TSH, 3rd generation with Free T4 reflex; Future  -     Iron; Future  -     Ferritin; Future  -     CBC  -     Comprehensive metabolic panel  -     TSH, 3rd generation with Free T4 reflex  -     Iron  -     Ferritin  10. Anemia, unspecified type  -     CBC; Future  -     Comprehensive metabolic panel; Future  -     Iron; Future  -     Ferritin; Future  -     CBC  -     Comprehensive metabolic panel  -     Iron  -     Ferritin       History of Present Illness     Ivy is here for follow-up of chronic conditions.  She has multiple chronic conditions to include COPD asthma overlap syndrome, severe obstructive sleep apnea, O2 dependence, congestive heart failure, diabetes, hyperlipidemia, coronary artery disease, chronic kidney disease.  She follows with multiple specialties to include cardiology, pulmonology, nephrology, sleep medicine, endocrinology.  Overall she reports she is doing generally well.  She came here by herself.  She has no new concerns today.    Major  depressive disorder and anxiety.  Overall doing well with her current regimen.  She is sleeping adequately.  She has not needed the use of lorazepam.  No suicidal ideation homicidal ideation.    Reviewed notes from cardiology, pulmonology, endocrinology.    Medications were reviewed.  Reports no issues with her current regimen.    Today she complains of increased restless leg syndrome.  She has had this in the past.  She was on Requip in the past.  This was a few years ago.        Review of Systems   Constitutional: Negative.  Negative for activity change, appetite change, chills, diaphoresis, fatigue and fever.   HENT:  Negative for congestion, facial swelling and sore throat.    Respiratory: Negative.  Negative for apnea, cough, chest tightness and shortness of breath.    Cardiovascular: Negative.  Negative for chest pain and palpitations.   Gastrointestinal: Negative.  Negative for abdominal distention, abdominal pain, blood in stool, constipation, diarrhea and nausea.   Genitourinary: Negative.  Negative for difficulty urinating, dysuria, flank pain and frequency.     Current Outpatient Medications on File Prior to Visit   Medication Sig Dispense Refill    acetaminophen (TYLENOL) 650 mg CR tablet Take 650 mg by mouth every 8 (eight) hours as needed for mild pain      albuterol (2.5 mg/3 mL) 0.083 % nebulizer solution Take 3 mL (2.5 mg total) by nebulization every 6 (six) hours as needed for wheezing or shortness of breath 1080 mL 3    albuterol (PROVENTIL HFA,VENTOLIN HFA) 90 mcg/act inhaler Inhale 2 puffs every 4 (four) hours as needed for wheezing 8.5 g 5    apixaban (Eliquis) 5 mg Take 1 tablet (5 mg total) by mouth 2 (two) times a day 180 tablet 3    atorvastatin (LIPITOR) 40 mg tablet Take 1 tablet (40 mg total) by mouth daily 90 tablet 3    Blood Glucose Monitoring Suppl (Contour Next One) AILYN USE AS DIRECTED 3 TIMES A DAY (Patient taking differently: USE AS DIRECTED 3 TIMES A DAY    Once a day) 1 each 0     budesonide (PULMICORT) 0.5 mg/2 mL nebulizer solution Take 2 mL (0.5 mg total) by nebulization 2 (two) times a day Rinse mouth after use. 60 mL 3    bumetanide (BUMEX) 2 mg tablet Take 2 tablets (4 mg total) by mouth 2 (two) times a day 120 tablet 11    Contour Next Test test strip USE TO TEST BLOOD SUGAR 3 TIMES A DAY (Patient taking differently: USE TO TEST BLOOD SUGAR 3 TIMES A DAY    Once a day) 100 strip 3    [employer] Lancets Thin 26G MISC USE 3 (THREE) TIMES A DAY (Patient taking differently: Once a day) 100 each 5    dapagliflozin (Farxiga) 10 MG tablet Take 1 tablet (10 mg total) by mouth daily 90 tablet 3    diltiazem (CARDIZEM CD) 120 mg 24 hr capsule Take 1 capsule (120 mg total) by mouth daily 90 capsule 3    DULoxetine (CYMBALTA) 60 mg delayed release capsule Take 60 mg by mouth daily      Easy Comfort Pen Needles 33G X 4 MM MISC USE FOUR new needles DAILY      fluticasone (FLONASE) 50 mcg/act nasal spray 1 spray into each nostril 2 (two) times a day 8 g 0    gabapentin (NEURONTIN) 300 mg capsule Take 300 mg by mouth daily Pt reports taking 300 mg TID      HumaLOG KwikPen 100 units/mL injection pen Inject 15 Units under the skin 3 (three) times a day with meals      Insulin Pen Needle (BD Pen Needle Love 2nd Gen) 32G X 4 MM MISC Use daily at bedtime Use 4 new needles daily . 400 each 3    levalbuterol (XOPENEX) 1.25 mg/0.5 mL nebulizer solution Take 0.5 mL (1.25 mg total) by nebulization 2 (two) times a day 60 vial 0    loratadine (CLARITIN) 10 mg tablet Take 1 tablet (10 mg total) by mouth daily as needed for allergies 30 tablet 0    metoprolol succinate (TOPROL-XL) 100 mg 24 hr tablet Take 1 tablet (100 mg total) by mouth daily 90 tablet 3    montelukast (SINGULAIR) 10 mg tablet Take 1 tablet (10 mg total) by mouth daily at bedtime 90 tablet 3    naloxone (NARCAN) 4 mg/0.1 mL nasal spray Administer 1 spray into a nostril. If breathing does not return to normal or if breathing difficulty resumes after  2-3 minutes, give another dose in the other nostril using a new spray. 1 each 1    nystatin (MYCOSTATIN) powder Apply topically 2 (two) times a day 30 g 0    omeprazole (PriLOSEC) 40 MG capsule Take 1 capsule (40 mg total) by mouth daily 90 capsule 3    semaglutide, 1 mg/dose, (Ozempic, 1 MG/DOSE,) 4 mg/3 mL injection pen Inject 0.75 mL (1 mg total) under the skin every 7 days 3 mL 5    spironolactone (ALDACTONE) 50 mg tablet Take 2 tablets (100 mg total) by mouth daily 180 tablet 1    tiotropium-olodaterol (Stiolto Respimat) 2.5-2.5 MCG/ACT inhaler Inhale 2 puffs daily 12 g 5    traZODone (DESYREL) 150 mg tablet Take 1 tablet (150 mg total) by mouth daily at bedtime 30 tablet 5    Vitamin D, Cholecalciferol, 50 MCG (2000 UT) CAPS Take 2,000 Int'l Units/day by mouth in the morning 90 capsule 3    EPINEPHrine (EPIPEN) 0.3 mg/0.3 mL SOAJ Inject 0.3 mL (0.3 mg total) into a muscle once for 1 dose 0.6 mL 0    Insulin Glargine Solostar (Lantus SoloStar) 100 UNIT/ML SOPN Inject 0.42 mL (42 Units total) as directed daily at bedtime Inject 45 units daily at bedtime (Patient taking differently: Inject 43 Units as directed daily at bedtime) 37.8 mL 1    insulin glulisine (Apidra SoloStar) 100 units/mL injection pen Inject 15 Units under the skin 3 (three) times a day with meals 15 units 3 times a day with meals (Patient not taking: Reported on 2024) 15 mL 6    Insulin Lispro-aabc, 1 U Dial, (Sandraumjeemmy PerezikPen) 100 UNIT/ML SOPN Inject 15 Units under the skin 3 (three) times a day before meals (Patient not taking: Reported on 2024) 30 mL 3     No current facility-administered medications on file prior to visit.      Social History     Tobacco Use    Smoking status: Former     Current packs/day: 0.00     Average packs/day: 0.3 packs/day for 29.9 years (7.5 ttl pk-yrs)     Types: Cigarettes     Start date:      Quit date: 12/10/2019     Years since quittin.7    Smokeless tobacco: Never   Vaping Use    Vaping  "status: Never Used   Substance and Sexual Activity    Alcohol use: Not Currently     Alcohol/week: 20.0 standard drinks of alcohol     Types: 20 Cans of beer per week     Comment: about 6 coors light every night, stopped in 2019    Drug use: No    Sexual activity: Not Currently     Objective     BP 96/58   Pulse (!) 107   Temp (!) 97.1 °F (36.2 °C)   Ht 5' 3\" (1.6 m)   Wt 117 kg (258 lb 6.4 oz)   SpO2 94% Comment: 4LO2  BMI 45.77 kg/m²     Physical Exam  Vitals reviewed.   Constitutional:       General: She is not in acute distress.     Appearance: Normal appearance. She is not toxic-appearing.   HENT:      Head: Normocephalic.      Right Ear: External ear normal.      Left Ear: External ear normal.      Nose: Nose normal.   Eyes:      Extraocular Movements: Extraocular movements intact.      Pupils: Pupils are equal, round, and reactive to light.   Cardiovascular:      Rate and Rhythm: Normal rate and regular rhythm.      Pulses: no weak pulses.           Dorsalis pedis pulses are 2+ on the right side and 2+ on the left side.        Posterior tibial pulses are 2+ on the right side and 2+ on the left side.   Pulmonary:      Effort: Pulmonary effort is normal.      Breath sounds: Normal breath sounds.      Comments: On oxygen  Abdominal:      General: Bowel sounds are normal.      Palpations: Abdomen is soft.   Musculoskeletal:      Cervical back: Normal range of motion and neck supple.   Feet:      Right foot:      Skin integrity: Callus and dry skin present. No ulcer, skin breakdown, erythema or warmth.      Left foot:      Skin integrity: Callus and dry skin present. No ulcer, skin breakdown, erythema or warmth.   Skin:     Capillary Refill: Capillary refill takes less than 2 seconds.   Neurological:      Mental Status: She is alert and oriented to person, place, and time.   Psychiatric:         Mood and Affect: Mood normal.         Behavior: Behavior normal.     Diabetic Foot Exam    Patient's shoes and " socks removed.    Right Foot/Ankle   Right Foot Inspection  Skin Exam: skin normal, skin intact, dry skin, callus and callus. No warmth, no erythema, no maceration, no abnormal color, no pre-ulcer and no ulcer.     Toe Exam: ROM and strength within normal limits.     Sensory   Vibration: diminished  Proprioception: diminished  Monofilament testing: diminished    Vascular  Capillary refills: < 3 seconds  The right DP pulse is 2+. The right PT pulse is 2+.     Left Foot/Ankle  Left Foot Inspection  Skin Exam: skin normal, skin intact, dry skin and callus. No warmth, no erythema, no maceration, normal color, no pre-ulcer and no ulcer.     Toe Exam: ROM and strength within normal limits.     Sensory   Vibration: diminished  Proprioception: intact  Monofilament testing: absent    Vascular  Capillary refills: < 3 seconds  The left DP pulse is 2+. The left PT pulse is 2+.     Assign Risk Category  No deformity present  Loss of protective sensation  No weak pulses  Risk: 1    Administrative Statements   I have spent a total time of 42 minutes in caring for this patient on the day of the visit/encounter including Risks and benefits of tx options, Instructions for management, Risk factor reductions, Impressions, Documenting in the medical record, Reviewing / ordering tests, medicine, procedures  , and Obtaining or reviewing history  .

## 2024-08-27 NOTE — ASSESSMENT & PLAN NOTE
Cardiovascular risk is high.  She is diabetic with heart disease and chronic kidney disease.  She will continue on statin.

## 2024-08-27 NOTE — ASSESSMENT & PLAN NOTE
Overall she is doing well in terms of depression.  She has no suicidal ideation or homicidal ideation.  She will continue on her same regimen.  Of note she has not needed lorazepam.  She has not had a prescription since March 2024.  She will continue to try to stay off of this.

## 2024-08-27 NOTE — ASSESSMENT & PLAN NOTE
She is currently stable.  She is on rate control and anticoagulation.    Ongoing follow-up with cardiology.

## 2024-08-27 NOTE — ASSESSMENT & PLAN NOTE
Vicki also follows with pulmonology.  She has an asthma/COPD overlap syndrome.  She is oxygen dependent.  At this point in time she is stable.  No exacerbation.  She will continue with her same inhalers.  Continue to monitor her symptoms.

## 2024-08-27 NOTE — ASSESSMENT & PLAN NOTE
Lab Results   Component Value Date    HGBA1C 7.7 (A) 06/17/2024     Diabetes has had better control.  Due to multiple comorbidities it is very hard for her to obtain strict control of her diabetes.  She will remain on the same regimen.  She will continue following up with endocrinology.  There have been no episodes of hypoglycemia.

## 2024-08-27 NOTE — ASSESSMENT & PLAN NOTE
Wt Readings from Last 3 Encounters:   08/26/24 117 kg (258 lb 6.4 oz)   07/29/24 116 kg (255 lb)   06/25/24 115 kg (253 lb)     Continue management of her hypertension and chronic kidney disease.  She continues to follow-up with both cardiology and nephrology.  CKD 4 appears to be stable.  Continue to monitor.

## 2024-08-27 NOTE — ASSESSMENT & PLAN NOTE
Ivy has a history of restless leg syndrome.  She was on Requip in the past.  She is currently already on gabapentin for diabetic neuropathy.    She does have a history of chronic kidney disease, anemia.  Will check iron and ferritin levels.  Will update a TSH.  This may be the underlying reason why she has an increased restless leg symptoms.  If iron or thyroid is off she will need treatment for this.  I would rather focus on this rather than restarting Requip.  If labs are normal we will consider restarting Requip.

## 2024-08-30 ENCOUNTER — TELEPHONE (OUTPATIENT)
Age: 64
End: 2024-08-30

## 2024-08-30 DIAGNOSIS — I50.32 CHRONIC DIASTOLIC CHF (CONGESTIVE HEART FAILURE) (HCC): ICD-10-CM

## 2024-08-30 RX ORDER — BUMETANIDE 2 MG/1
6 TABLET ORAL 2 TIMES DAILY
Qty: 540 TABLET | Refills: 3 | Status: SHIPPED | OUTPATIENT
Start: 2024-08-30

## 2024-08-30 NOTE — TELEPHONE ENCOUNTER
Patient called the RX Refill Line. Message is being forwarded to the office.     Patient called stating that her bumetanide (BUMEX) 2 mg tablet was decreased to 2 tablets twice a day. She has been weighing herself and seen she had been gaining weight so she went back to taking 3 tablets twice a day. Patient would like a new prescription sent to her pharmacy for that dose so she does not have any issues getting a refill. Patient will be out of medication by the weekend. Please review   Patient would like the prescription sent to [facility]     Please contact patient at [phone]

## 2024-09-05 ENCOUNTER — HOSPITAL ENCOUNTER (OUTPATIENT)
Dept: BONE DENSITY | Facility: IMAGING CENTER | Age: 64
Discharge: HOME | End: 2024-09-05
Payer: MEDICARE

## 2024-09-05 VITALS — BODY MASS INDEX: 45.86 KG/M2 | HEIGHT: 63 IN | WEIGHT: 258.8 LBS

## 2024-09-05 DIAGNOSIS — E28.39 MENOPAUSE OVARIAN FAILURE: ICD-10-CM

## 2024-09-05 PROCEDURE — 77080 DXA BONE DENSITY AXIAL: CPT

## 2024-09-11 LAB
ALBUMIN SERPL-MCNC: 4.1 G/DL (ref 3.9–4.9)
ALBUMIN SERPL-MCNC: 4.2 G/DL (ref 3.9–4.9)
ALP SERPL-CCNC: 138 IU/L (ref 44–121)
ALP SERPL-CCNC: 142 IU/L (ref 44–121)
ALT SERPL-CCNC: 15 IU/L (ref 0–32)
ALT SERPL-CCNC: 16 IU/L (ref 0–32)
AST SERPL-CCNC: 16 IU/L (ref 0–40)
AST SERPL-CCNC: 17 IU/L (ref 0–40)
BILIRUB SERPL-MCNC: 0.4 MG/DL (ref 0–1.2)
BILIRUB SERPL-MCNC: 0.4 MG/DL (ref 0–1.2)
BUN SERPL-MCNC: 19 MG/DL (ref 8–27)
BUN/CREAT SERPL: 22 (ref 12–28)
BUN/CREAT SERPL: 23 (ref 12–28)
BUN/CREAT SERPL: 24 (ref 12–28)
CALCIUM SERPL-MCNC: 9.2 MG/DL (ref 8.7–10.3)
CALCIUM SERPL-MCNC: 9.2 MG/DL (ref 8.7–10.3)
CALCIUM SERPL-MCNC: 9.3 MG/DL (ref 8.7–10.3)
CHLORIDE SERPL-SCNC: 93 MMOL/L (ref 96–106)
CHLORIDE SERPL-SCNC: 93 MMOL/L (ref 96–106)
CHLORIDE SERPL-SCNC: 95 MMOL/L (ref 96–106)
CHOLEST SERPL-MCNC: 88 MG/DL (ref 100–199)
CHOLEST/HDLC SERPL: 2.1 RATIO (ref 0–4.4)
CO2 SERPL-SCNC: 30 MMOL/L (ref 20–29)
CREAT SERPL-MCNC: 0.8 MG/DL (ref 0.57–1)
CREAT SERPL-MCNC: 0.84 MG/DL (ref 0.57–1)
CREAT SERPL-MCNC: 0.85 MG/DL (ref 0.57–1)
EGFR: 76 ML/MIN/1.73
EGFR: 77 ML/MIN/1.73
EGFR: 82 ML/MIN/1.73
ERYTHROCYTE [DISTWIDTH] IN BLOOD BY AUTOMATED COUNT: 17.9 % (ref 11.7–15.4)
EST. AVERAGE GLUCOSE BLD GHB EST-MCNC: 197 MG/DL
FERRITIN SERPL-MCNC: 8 NG/ML (ref 15–150)
GLOBULIN SER-MCNC: 2.1 G/DL (ref 1.5–4.5)
GLOBULIN SER-MCNC: 2.2 G/DL (ref 1.5–4.5)
GLUCOSE SERPL-MCNC: 116 MG/DL (ref 70–99)
GLUCOSE SERPL-MCNC: 120 MG/DL (ref 70–99)
GLUCOSE SERPL-MCNC: 121 MG/DL (ref 70–99)
HBA1C MFR BLD: 8.5 % (ref 4.8–5.6)
HCT VFR BLD AUTO: 37.4 % (ref 34–46.6)
HDLC SERPL-MCNC: 42 MG/DL
HGB BLD-MCNC: 10.1 G/DL (ref 11.1–15.9)
IRON SERPL-MCNC: 20 UG/DL (ref 27–139)
LDLC SERPL CALC-MCNC: 30 MG/DL (ref 0–99)
MCH RBC QN AUTO: 18.6 PG (ref 26.6–33)
MCHC RBC AUTO-ENTMCNC: 27 G/DL (ref 31.5–35.7)
MCV RBC AUTO: 69 FL (ref 79–97)
PLATELET # BLD AUTO: 401 X10E3/UL (ref 150–450)
POTASSIUM SERPL-SCNC: 4.6 MMOL/L (ref 3.5–5.2)
POTASSIUM SERPL-SCNC: 4.6 MMOL/L (ref 3.5–5.2)
POTASSIUM SERPL-SCNC: 4.7 MMOL/L (ref 3.5–5.2)
PROT SERPL-MCNC: 6.2 G/DL (ref 6–8.5)
PROT SERPL-MCNC: 6.4 G/DL (ref 6–8.5)
RBC # BLD AUTO: 5.42 X10E6/UL (ref 3.77–5.28)
SL AMB VLDL CHOLESTEROL CALC: 16 MG/DL (ref 5–40)
SODIUM SERPL-SCNC: 137 MMOL/L (ref 134–144)
SODIUM SERPL-SCNC: 138 MMOL/L (ref 134–144)
SODIUM SERPL-SCNC: 139 MMOL/L (ref 134–144)
TRIGL SERPL-MCNC: 75 MG/DL (ref 0–149)
TSH SERPL DL<=0.005 MIU/L-ACNC: 4.26 UIU/ML (ref 0.45–4.5)
TSH SERPL DL<=0.005 MIU/L-ACNC: 4.31 UIU/ML (ref 0.45–4.5)
WBC # BLD AUTO: 13.5 X10E3/UL (ref 3.4–10.8)

## 2024-09-12 ENCOUNTER — TELEPHONE (OUTPATIENT)
Age: 64
End: 2024-09-12

## 2024-09-12 NOTE — TELEPHONE ENCOUNTER
Pt called stating the wheelchair that was ordered for her is too heavy. [employer] is coming to pick it up on Tuesday. She wants to order a light weight one but needs a dr order to do so. The fax number for [employer] is [phone]

## 2024-09-16 ENCOUNTER — TELEPHONE (OUTPATIENT)
Dept: FAMILY MEDICINE CLINIC | Facility: HOSPITAL | Age: 64
End: 2024-09-16

## 2024-09-16 ENCOUNTER — OFFICE VISIT (OUTPATIENT)
Dept: FAMILY MEDICINE CLINIC | Facility: HOSPITAL | Age: 64
End: 2024-09-16
Payer: MEDICARE

## 2024-09-16 VITALS
HEIGHT: 63 IN | DIASTOLIC BLOOD PRESSURE: 63 MMHG | OXYGEN SATURATION: 97 % | HEART RATE: 88 BPM | SYSTOLIC BLOOD PRESSURE: 111 MMHG | BODY MASS INDEX: 45.84 KG/M2 | TEMPERATURE: 97.1 F

## 2024-09-16 DIAGNOSIS — D64.9 ANEMIA, UNSPECIFIED TYPE: Primary | ICD-10-CM

## 2024-09-16 DIAGNOSIS — Z12.4 ENCOUNTER FOR SCREENING FOR CERVICAL CANCER: ICD-10-CM

## 2024-09-16 DIAGNOSIS — Z01.419 ENCNTR FOR GYN EXAM (GENERAL) (ROUTINE) W/O ABN FINDINGS: Primary | ICD-10-CM

## 2024-09-16 PROCEDURE — G2211 COMPLEX E/M VISIT ADD ON: HCPCS | Performed by: NURSE PRACTITIONER

## 2024-09-16 PROCEDURE — G0101 CA SCREEN;PELVIC/BREAST EXAM: HCPCS | Performed by: NURSE PRACTITIONER

## 2024-09-16 RX ORDER — FERROUS SULFATE 220 (44)/5
6.8 SOLUTION, ORAL ORAL 2 TIMES DAILY
Qty: 473 ML | Refills: 0 | Status: SHIPPED | OUTPATIENT
Start: 2024-09-16

## 2024-09-16 NOTE — PROGRESS NOTES
Pt is a63 y.o.  ,menopausal, who presents for preventive care    Calcium: None  Vitamin D: Yes  Exercise: None  Colonoscopy: UTD  DEXA:   Mammo: scheduled  tobacco use No    Past Medical History:   Diagnosis Date    Acute blood loss anemia 2021    Acute diverticulitis 2021    Acute on chronic diastolic congestive heart failure (HCC) 2020    Acute on chronic respiratory failure with hypoxia (HCC) 2018    Alcohol abuse 2020    Anemia     Asthma with COPD with exacerbation  (HCC) 2020    Atrial fibrillation (HCC)     Cervical radiculopathy     CHF (congestive heart failure) (HCC)     Chronic low back pain     Chronic obstructive asthma 2018    Cigarette nicotine dependence without complication 2019    Quit 12/10/2019.     Community acquired pneumonia 2020    COPD exacerbation (HCC) 2020    Depression with anxiety 2014    Diabetes mellitus with hyperglycemia (HCC)     Diverticulitis 2021    Elevated liver enzymes     GERD (gastroesophageal reflux disease)     Hypersomnolence 10/28/2020    Hypokalemia 2018    Lower gastrointestinal bleeding 2021    Paresthesia of upper extremity     Plantar fasciitis     Restless legs syndrome 2014    Severe persistent asthma with exacerbation 2018    PFTs 2018:  FEV1 0.87 L-35% predicted, 27% improvement post-bronchodilator.  DLCO-82% predicted,  The patient has been having worsening of her symptoms now for few months, especially  from 2020, also she had multiple exacerbations last year and most recently required a steroid burst and August  Reviewing her CT scan  New PFTs with severe obstruction reviewed  Hypersensitivity p    Sexual dysfunction     Sleep apnea, obstructive     Tenosynovitis of finger     Tinea corporis     Tobacco use 2018    Currently smoking 3-4 cigarettes daily    Trigger middle finger of left hand 2017    Trigger ring finger of  left hand 2017    Weakness of upper extremity        Past Surgical History:   Procedure Laterality Date    ABDOMINAL SURGERY      CARPAL TUNNEL RELEASE Bilateral      SECTION      CHOLECYSTECTOMY      laparoscopic    ESOPHAGOGASTRODUODENOSCOPY N/A 12/3/2018    Procedure: ESOPHAGOGASTRODUODENOSCOPY (EGD) AND COLONOSCOPY;  Surgeon: Ludmila Perry MD;  Location: [facility];  Service: Gastroenterology    GASTRIC BYPASS      for morbid obesity with gilda en y    HERNIA REPAIR      HYSTERECTOMY      AK EXCISION GANGLION WRIST DORSAL/VOLAR PRIMARY Left 2017    Procedure: LONG FINGER GANGLION CYST EXCISION;  Surgeon: Philippe Clark MD;  Location: [facility];  Service: Orthopedics    AK TENDON SHEATH INCISION Left 2017    Procedure: THUMB, LONG, RING, TRIGGER FINGER RELEASE;  Surgeon: Philippe Clark MD;  Location: [facility];  Service: Orthopedics    SHOULDER SURGERY Right        Ob Hx:   OB History    Para Term  AB Living   3 3 3         SAB IAB Ectopic Multiple Live Births                  # Outcome Date GA Lbr Indio/2nd Weight Sex Type Anes PTL Lv   3 Term            2 Term            1 Term                Gyn HX:   h/o abnormal pap with positive HPV. Reports colposcopy over 10 years ago that was normal. Reports retested for HPV and negative.   Mother with ovarian cancer in her 80s.   Cousin with breast cancer.   Reports vaginal itching and dryness. No postmenopausal bleeding. Soreness in perineum.     Current Outpatient Medications:     acetaminophen (TYLENOL) 650 mg CR tablet, Take 650 mg by mouth every 8 (eight) hours as needed for mild pain, Disp: , Rfl:     albuterol (2.5 mg/3 mL) 0.083 % nebulizer solution, Take 3 mL (2.5 mg total) by nebulization every 6 (six) hours as needed for wheezing or shortness of breath, Disp: 1080 mL, Rfl: 3    albuterol (PROVENTIL HFA,VENTOLIN HFA) 90 mcg/act inhaler, Inhale 2 puffs every 4 (four) hours as needed for wheezing, Disp: 8.5 g, Rfl:  5    apixaban (Eliquis) 5 mg, Take 1 tablet (5 mg total) by mouth 2 (two) times a day, Disp: 180 tablet, Rfl: 3    atorvastatin (LIPITOR) 40 mg tablet, Take 1 tablet (40 mg total) by mouth daily, Disp: 90 tablet, Rfl: 3    Blood Glucose Monitoring Suppl (Contour Next One) AILYN, USE AS DIRECTED 3 TIMES A DAY (Patient taking differently: USE AS DIRECTED 3 TIMES A DAY  Once a day), Disp: 1 each, Rfl: 0    budesonide (PULMICORT) 0.5 mg/2 mL nebulizer solution, Take 2 mL (0.5 mg total) by nebulization 2 (two) times a day Rinse mouth after use., Disp: 60 mL, Rfl: 3    bumetanide (BUMEX) 2 mg tablet, Take 3 tablets (6 mg total) by mouth 2 (two) times a day, Disp: 540 tablet, Rfl: 3    Contour Next Test test strip, USE TO TEST BLOOD SUGAR 3 TIMES A DAY (Patient taking differently: USE TO TEST BLOOD SUGAR 3 TIMES A DAY  Once a day), Disp: 100 strip, Rfl: 3    [employer] Lancets Thin 26G MISC, USE 3 (THREE) TIMES A DAY (Patient taking differently: Once a day), Disp: 100 each, Rfl: 5    dapagliflozin (Farxiga) 10 MG tablet, Take 1 tablet (10 mg total) by mouth daily, Disp: 90 tablet, Rfl: 3    diltiazem (CARDIZEM CD) 120 mg 24 hr capsule, Take 1 capsule (120 mg total) by mouth daily, Disp: 90 capsule, Rfl: 3    DULoxetine (CYMBALTA) 60 mg delayed release capsule, Take 60 mg by mouth daily, Disp: , Rfl:     Easy Comfort Pen Needles 33G X 4 MM MISC, USE FOUR new needles DAILY, Disp: , Rfl:     fluticasone (FLONASE) 50 mcg/act nasal spray, 1 spray into each nostril 2 (two) times a day, Disp: 8 g, Rfl: 0    gabapentin (NEURONTIN) 300 mg capsule, Take 300 mg by mouth daily Pt reports taking 300 mg TID, Disp: , Rfl:     HumaLOG KwikPen 100 units/mL injection pen, Inject 15 Units under the skin 3 (three) times a day with meals, Disp: , Rfl:     Insulin Pen Needle (BD Pen Needle Love 2nd Gen) 32G X 4 MM MISC, Use daily at bedtime Use 4 new needles daily ., Disp: 400 each, Rfl: 3    levalbuterol (XOPENEX) 1.25 mg/0.5 mL nebulizer solution,  Take 0.5 mL (1.25 mg total) by nebulization 2 (two) times a day, Disp: 60 vial, Rfl: 0    loratadine (CLARITIN) 10 mg tablet, Take 1 tablet (10 mg total) by mouth daily as needed for allergies, Disp: 30 tablet, Rfl: 0    metoprolol succinate (TOPROL-XL) 100 mg 24 hr tablet, Take 1 tablet (100 mg total) by mouth daily, Disp: 90 tablet, Rfl: 3    montelukast (SINGULAIR) 10 mg tablet, Take 1 tablet (10 mg total) by mouth daily at bedtime, Disp: 90 tablet, Rfl: 3    naloxone (NARCAN) 4 mg/0.1 mL nasal spray, Administer 1 spray into a nostril. If breathing does not return to normal or if breathing difficulty resumes after 2-3 minutes, give another dose in the other nostril using a new spray., Disp: 1 each, Rfl: 1    nystatin (MYCOSTATIN) powder, Apply topically 2 (two) times a day, Disp: 30 g, Rfl: 0    omeprazole (PriLOSEC) 40 MG capsule, Take 1 capsule (40 mg total) by mouth daily, Disp: 90 capsule, Rfl: 3    semaglutide, 1 mg/dose, (Ozempic, 1 MG/DOSE,) 4 mg/3 mL injection pen, Inject 0.75 mL (1 mg total) under the skin every 7 days, Disp: 3 mL, Rfl: 5    spironolactone (ALDACTONE) 50 mg tablet, Take 2 tablets (100 mg total) by mouth daily, Disp: 180 tablet, Rfl: 1    tiotropium-olodaterol (Stiolto Respimat) 2.5-2.5 MCG/ACT inhaler, Inhale 2 puffs daily, Disp: 12 g, Rfl: 5    traZODone (DESYREL) 150 mg tablet, Take 1 tablet (150 mg total) by mouth daily at bedtime, Disp: 30 tablet, Rfl: 5    Vitamin D, Cholecalciferol, 50 MCG (2000 UT) CAPS, Take 2,000 Int'l Units/day by mouth in the morning, Disp: 90 capsule, Rfl: 3    EPINEPHrine (EPIPEN) 0.3 mg/0.3 mL SOAJ, Inject 0.3 mL (0.3 mg total) into a muscle once for 1 dose, Disp: 0.6 mL, Rfl: 0    Insulin Glargine Solostar (Lantus SoloStar) 100 UNIT/ML SOPN, Inject 0.42 mL (42 Units total) as directed daily at bedtime Inject 45 units daily at bedtime (Patient taking differently: Inject 43 Units as directed daily at bedtime), Disp: 37.8 mL, Rfl: 1    insulin glulisine  (Apidra SoloStar) 100 units/mL injection pen, Inject 15 Units under the skin 3 (three) times a day with meals 15 units 3 times a day with meals (Patient not taking: Reported on 2024), Disp: 15 mL, Rfl: 6    Insulin Lispro-aabc, 1 U Dial, (Lyumjev KwikPen) 100 UNIT/ML SOPN, Inject 15 Units under the skin 3 (three) times a day before meals (Patient not taking: Reported on 2024), Disp: 30 mL, Rfl: 3    Allergies   Allergen Reactions    Iron Dextran Swelling    Januvia [Sitagliptin] Shortness Of Breath    Jardiance [Empagliflozin] Shortness Of Breath    Clonazepam Other (See Comments)     Unknown reaction    Codeine Itching and Other (See Comments)     itch;mary kay morphine,takes ultram @home    Adhesive [Medical Tape] Itching     itching    Effexor [Venlafaxine] Itching    Lactose - Food Allergy GI Intolerance    Oxycodone-Acetaminophen Anxiety    Oxycodone-Acetaminophen Itching and Rash     Other reaction(s): Other (See Comments)  (PERCOCET) MILD RASH, not allergic to Acetaminophen        Social History     Socioeconomic History    Marital status: Significant Other     Spouse name: None    Number of children: None    Years of education: None    Highest education level: None   Occupational History    None   Tobacco Use    Smoking status: Former     Current packs/day: 0.00     Average packs/day: 0.3 packs/day for 29.9 years (7.5 ttl pk-yrs)     Types: Cigarettes     Start date:      Quit date: 12/10/2019     Years since quittin.7    Smokeless tobacco: Never   Vaping Use    Vaping status: Never Used   Substance and Sexual Activity    Alcohol use: Not Currently     Alcohol/week: 20.0 standard drinks of alcohol     Types: 20 Cans of beer per week     Comment: about 6 coors light every night, stopped in 2019    Drug use: No    Sexual activity: Not Currently   Other Topics Concern    None   Social History Narrative    None     Social Determinants of Health     Financial Resource Strain: Not on file   Food  "Insecurity: No Food Insecurity (4/29/2024)    Hunger Vital Sign     Worried About Running Out of Food in the Last Year: Never true     Ran Out of Food in the Last Year: Never true   Transportation Needs: No Transportation Needs (4/29/2024)    PRAPARE - Transportation     Lack of Transportation (Medical): No     Lack of Transportation (Non-Medical): No   Physical Activity: Not on file   Stress: Not on file   Social Connections: Not on file   Intimate Partner Violence: Not on file   Housing Stability: Low Risk  (4/29/2024)    Housing Stability Vital Sign     Unable to Pay for Housing in the Last Year: No     Number of Times Moved in the Last Year: 1     Homeless in the Last Year: No       Family History   Problem Relation Age of Onset    Alzheimer's disease Mother     Atrial fibrillation Mother     Dementia Mother     Heart disease Mother         heart problem    Seizures Mother     Parkinsonism Father     Arthritis Father     Atrial fibrillation Father     No Known Problems Daughter     Cri-du-chat syndrome Daughter     Colon cancer Maternal Grandmother 80    Diabetes type II Maternal Grandmother     No Known Problems Brother     No Known Problems Son     Substance Abuse Neg Hx         mother,father    Mental illness Neg Hx         mother,father    Colon polyps Neg Hx        Review of Systems   Constitutional:  Negative for chills, fatigue and fever.   Respiratory:  Positive for shortness of breath (chronic-O2 dependent).    Cardiovascular:  Negative for chest pain, palpitations and leg swelling.   Gastrointestinal:  Negative for abdominal pain, constipation, diarrhea, nausea and vomiting.   Genitourinary: Negative.        Blood pressure 111/63, pulse 88, temperature (!) 97.1 °F (36.2 °C), temperature source Temporal, height 5' 3\" (1.6 m), SpO2 97%, not currently breastfeeding. and Body mass index is 45.84 kg/m².    Physical Exam  Constitutional:       Appearance: Normal appearance. She is well-developed. She is " obese.   Genitourinary:      Urethral meatus normal.      Genitourinary Comments: Exam extremely limited by body habitus and discomfort.       Right Labia: No rash, tenderness, lesions, skin changes or Bartholin's cyst.     Left Labia: No tenderness, lesions, skin changes, Bartholin's cyst or rash.     No vaginal discharge, erythema, tenderness or bleeding.      Severe vaginal atrophy present.       Right Adnexa: not tender, not palpable and no mass present.     Left Adnexa: not tender, not palpable and no mass present.     No cervical motion tenderness, discharge, friability or polyp.      Uterus is not tender.      No uterine mass detected.  Breasts:     Right: Normal.      Left: Normal.   Neck:      Thyroid: No thyromegaly.   Cardiovascular:      Rate and Rhythm: Normal rate and regular rhythm.      Heart sounds: Normal heart sounds. No murmur heard.  Pulmonary:      Effort: Pulmonary effort is normal.      Breath sounds: Normal breath sounds.   Abdominal:      General: Abdomen is flat. Bowel sounds are normal.      Palpations: Abdomen is soft. There is no hepatomegaly or splenomegaly.      Tenderness: There is no abdominal tenderness.   Neurological:      Mental Status: She is alert and oriented to person, place, and time.   Skin:     General: Skin is warm and dry.   Psychiatric:         Mood and Affect: Mood normal.         Behavior: Behavior normal.         Thought Content: Thought content normal.         Judgment: Judgment normal.         A/P:  Pt is a 63 y.o.  with exam extremely limited by body habitus and discomfort.   Advised to start calcium supplement and continue with Vit D3 supplementation.   If pap is negative then can cease further cervical cancer screenings.         Mattie was seen today for gynecologic exam.    Diagnoses and all orders for this visit:    Encntr for gyn exam (general) (routine) w/o abn findings    Encounter for screening for cervical cancer  -      IGP,CtNg,AptimaHPV

## 2024-09-17 ENCOUNTER — TELEPHONE (OUTPATIENT)
Age: 64
End: 2024-09-17

## 2024-09-17 NOTE — TELEPHONE ENCOUNTER
Patient called the RX Refill Line. Message is being forwarded to the office.     Patient is requesting a new script for her bumetanide. She stated the script she had was for only 2 tabs 2x daily.  Advised patient that there was an oral script called into [facility], stating that it's 3 tabs, 2x daily. Patient was going to contact pharmacy to see if the script is there. Advised her that if that wasn't on file, to have the pharmacy contact us back to send in an updated script.    Please contact patient at   [phone]

## 2024-09-17 NOTE — TELEPHONE ENCOUNTER
Patient called and made aware that a refill was sent on 9/16 for   Ferrous Sulfate 300 MG/6.8ML SOLN  for 473 mL to [facility].Patient verbalized understanding.

## 2024-09-18 LAB
C TRACH RRNA CVX QL NAA+PROBE: NEGATIVE
CYTOLOGIST CVX/VAG CYTO: NORMAL
DX ICD CODE: NORMAL
HPV I/H RISK 4 DNA CVX QL PROBE+SIG AMP: NEGATIVE
N GONORRHOEA RRNA CVX QL NAA+PROBE: NEGATIVE
OTHER STN SPEC: NORMAL
PATH REPORT.FINAL DX SPEC: NORMAL
SL AMB NOTE:: NORMAL
SL AMB SPECIMEN ADEQUACY: NORMAL
SL AMB TEST METHODOLOGY: NORMAL

## 2024-09-18 NOTE — TELEPHONE ENCOUNTER
Per chart:  E-Prescribing Status    Outpatient Medication Detail    bumetanide (BUMEX) 2 mg tablet        Sig: Take 3 tablets (6 mg total) by mouth 2 (two) times a day        Sent to pharmacy as: Bumetanide 2 MG Oral Tablet (BUMEX)        Class: Normal        Route: Oral        E-Prescribing Status: Receipt confirmed by pharmacy (8/30/2024  1:31 PM EDT)          To [facility].

## 2024-09-20 ENCOUNTER — OFFICE VISIT (OUTPATIENT)
Dept: ENDOCRINOLOGY | Facility: HOSPITAL | Age: 64
End: 2024-09-20
Payer: MEDICARE

## 2024-09-20 VITALS
WEIGHT: 254.2 LBS | OXYGEN SATURATION: 94 % | SYSTOLIC BLOOD PRESSURE: 96 MMHG | HEIGHT: 63 IN | DIASTOLIC BLOOD PRESSURE: 66 MMHG | BODY MASS INDEX: 45.04 KG/M2 | HEART RATE: 87 BPM

## 2024-09-20 DIAGNOSIS — E11.22 TYPE 2 DIABETES MELLITUS WITH STAGE 2 CHRONIC KIDNEY DISEASE, WITH LONG-TERM CURRENT USE OF INSULIN (HCC): ICD-10-CM

## 2024-09-20 DIAGNOSIS — Z79.4 TYPE 2 DIABETES MELLITUS WITH STAGE 2 CHRONIC KIDNEY DISEASE, WITH LONG-TERM CURRENT USE OF INSULIN (HCC): ICD-10-CM

## 2024-09-20 DIAGNOSIS — N18.2 TYPE 2 DIABETES MELLITUS WITH STAGE 2 CHRONIC KIDNEY DISEASE, WITH LONG-TERM CURRENT USE OF INSULIN (HCC): ICD-10-CM

## 2024-09-20 PROCEDURE — 95251 CONT GLUC MNTR ANALYSIS I&R: CPT | Performed by: PHYSICIAN ASSISTANT

## 2024-09-20 PROCEDURE — 99214 OFFICE O/P EST MOD 30 MIN: CPT | Performed by: PHYSICIAN ASSISTANT

## 2024-09-20 RX ORDER — INSULIN GLULISINE 100 [IU]/ML
INJECTION, SOLUTION SUBCUTANEOUS
Qty: 15 ML | Refills: 6 | Status: SHIPPED | OUTPATIENT
Start: 2024-09-20

## 2024-09-20 NOTE — PROGRESS NOTES
Mattie Joy 63 y.o. female MRN: [id number]    Encounter: [id number]      Assessment & Plan     Assessment:  This is a 63 y.o.-year-old female with type 2 diabetes with neuropathy and hyperlipidemia.    Plan:  1. Type 2 diabetes, insulin requiring: Unfortunately recent hemoglobin A1c increased to 8.5.  Review of her Dexcom shows that she significantly increases with breakfast, so what I would like to do is increase her Humalog to 20 units of breakfast and continue with 15 units at lunch and dinner.  She will continue with the same Lantus 42 units daily, Ozempic 1 mg weekly, and Farxiga 10 mg daily.  Continue utilizing Dexcom to monitor glucose levels.  Please keep me informed of how glucose levels are doing and we can make adjustments if needed.  Contact the office with any concerns or questions.  Follow-up in 3 months with lab work completed prior to visit.     Patient is type 2 diabetic currently utilizing 4 or more insulin injections a day.  She is checking her blood sugar for more times a day, and adjusting insulin doses according to current glucose levels.  Because of this, it would be beneficial for her to continue utilizing a Dexcom continuous glucose monitor.     2. Diabetic neuropathy:  Stable.  Diabetic foot exam is up-to-date..     3. History of stage 2 chronic kidney disease:  Kidney functions stable.  Chlorides are low but stable.  Possibly due to COPD.     4. Hyperlipidemia:  Most recent lipid panel looked excellent from December 2023.  Continue with current medication.  We will continue to monitor over time.    CC: Type 2 diabetes follow-up    History of Present Illness     HPI:  Mattie Joy is a 62 year old female with type 2 diabetes, insulin requiring for 8 year, hyperlipidemia for follow-up.   She was placed on insulin in June 2021 when her hemoglobin A1c went up markedly.  She reportedly did try Januvia and Jardiance in the past but had problems with side effects on these medications. She is  on oral agents and insulin at home and takes Lantus insulin 42 units at bedtime, Humalog 15 units with meals, Ozempic 1 mg weekly, and Farxiga 10 mg daily.  Previously on metformin.  She admits to polyuria, polydipsia, polyphagia, nocturia 3-4 times a night, and blurry vision.  She has unusual sensations of her feet with painful sensations of her feet.  She denies chest pain but has shortness of breath with her lung disease.  She is on oxygen 24 hours a day. She denies retinopathy, heart attack, stroke and claudication but does admit to neuropathy and nephropathy.  Is doing well today.  No hospitalizations since last office visit.  Overall states that she is doing well.  He has no concerns or questions today, but does admit that she needs to work on dietary habits to help improve glucose levels.  Limited physical activity capability due to her other chronic medical conditions.     Has followed up with [facility] November 2021.     Hypoglycemic episodes: No never.  H/o of hypoglycemia causing hospitalization or intervention such as glucagon injection  or ambulance call  No.  Hypoglycemia symptoms: never had one.  Treatment of hypoglycemia: would eat something sweet or sugar.  Glucagon:No.  Medic alert tag: recommended,Yes.      The patient's last eye exam was at May 2024.  The patient's last foot exam was in with podiatry, Dr. Blake Malik.  She last saw Podiatry several weeks ago and does every 3 months.  Last diabetic foot exam completed in the office was August 2024.     Blood Sugar/Glucometer/Pump/CGM review: Reviewed Dexcom download from September 7 through September 20, 2024 which revealed an average glucose level of 214 with a standard deviation of 64.  She is in target range 38% of the time, and above target range 62% time.  Glucose levels overnight are doing well, but increase significantly with breakfast and then remain high the rest the day.  Will not start trending down till around 9 PM.     She  has hyperlipidemia and takes atorvastatin 40 mg daily.  She denies chest pain but has rare palpitations.     Review of Systems   Constitutional:  Positive for fatigue. Negative for activity change, appetite change and unexpected weight change.   HENT:  Negative for sore throat and trouble swallowing.    Eyes:  Negative for visual disturbance.   Respiratory:  Positive for shortness of breath (On portable oxygen). Negative for chest tightness.    Cardiovascular:  Negative for chest pain, palpitations and leg swelling.   Gastrointestinal:  Negative for abdominal pain, constipation, diarrhea, nausea and vomiting.   Endocrine: Positive for polydipsia, polyphagia and polyuria. Negative for cold intolerance and heat intolerance.   Genitourinary:  Negative for frequency.        Nocturia   Musculoskeletal:  Positive for gait problem (Utilizes wheelchair).   Skin:  Negative for wound.   Neurological:  Positive for numbness ( bilateral feet). Negative for dizziness, weakness and headaches.   Psychiatric/Behavioral:  Positive for dysphoric mood and sleep disturbance. The patient is not nervous/anxious.        Historical Information   Past Medical History:   Diagnosis Date    Acute blood loss anemia 06/01/2021    Acute diverticulitis 05/02/2021    Acute on chronic diastolic congestive heart failure (HCC) 05/21/2020    Acute on chronic respiratory failure with hypoxia (HCC) 11/30/2018    Alcohol abuse 05/21/2020    Anemia     Asthma with COPD with exacerbation  ([facility]) 11/12/2020    Atrial fibrillation (HCC)     Cervical radiculopathy     CHF (congestive heart failure) ([facility])     Chronic low back pain     Chronic obstructive asthma 02/20/2018    Cigarette nicotine dependence without complication 11/20/2019    Quit 12/10/2019.     Community acquired pneumonia 05/21/2020    COPD exacerbation ([facility]) 09/16/2020    Depression with anxiety 03/09/2014    Diabetes mellitus with hyperglycemia (HCC)     Diverticulitis 06/06/2021    Elevated  liver enzymes     GERD (gastroesophageal reflux disease)     Hypersomnolence 10/28/2020    Hypokalemia 2018    Lower gastrointestinal bleeding 2021    Paresthesia of upper extremity     Plantar fasciitis     Restless legs syndrome 2014    Severe persistent asthma with exacerbation 2018    PFTs 2018:  FEV1 0.87 L-35% predicted, 27% improvement post-bronchodilator.  DLCO-82% predicted,  The patient has been having worsening of her symptoms now for few months, especially  from 2020, also she had multiple exacerbations last year and most recently required a steroid burst and August  Reviewing her CT scan  New PFTs with severe obstruction reviewed  Hypersensitivity p    Sexual dysfunction     Sleep apnea, obstructive     Tenosynovitis of finger     Tinea corporis     Tobacco use 2018    Currently smoking 3-4 cigarettes daily    Trigger middle finger of left hand 2017    Trigger ring finger of left hand 2017    Weakness of upper extremity      Past Surgical History:   Procedure Laterality Date    ABDOMINAL SURGERY      CARPAL TUNNEL RELEASE Bilateral      SECTION      CHOLECYSTECTOMY      laparoscopic    ESOPHAGOGASTRODUODENOSCOPY N/A 12/3/2018    Procedure: ESOPHAGOGASTRODUODENOSCOPY (EGD) AND COLONOSCOPY;  Surgeon: Ludmila Perry MD;  Location: [facility];  Service: Gastroenterology    GASTRIC BYPASS      for morbid obesity with gilda en y    HERNIA REPAIR      HYSTERECTOMY      MS EXCISION GANGLION WRIST DORSAL/VOLAR PRIMARY Left 2017    Procedure: LONG FINGER GANGLION CYST EXCISION;  Surgeon: Philippe Clark MD;  Location: [facility];  Service: Orthopedics    MS TENDON SHEATH INCISION Left 2017    Procedure: THUMB, LONG, RING, TRIGGER FINGER RELEASE;  Surgeon: Philippe Clark MD;  Location: [facility];  Service: Orthopedics    SHOULDER SURGERY Right      Social History   Social History     Substance and Sexual Activity   Alcohol Use  Not Currently    Alcohol/week: 20.0 standard drinks of alcohol    Types: 20 Cans of beer per week    Comment: about 6 coors light every night, stopped in 2019     Social History     Substance and Sexual Activity   Drug Use No     Social History     Tobacco Use   Smoking Status Former    Current packs/day: 0.00    Average packs/day: 0.3 packs/day for 29.9 years (7.5 ttl pk-yrs)    Types: Cigarettes    Start date:     Quit date: 12/10/2019    Years since quittin.7   Smokeless Tobacco Never     Family History:   Family History   Problem Relation Age of Onset    Alzheimer's disease Mother     Atrial fibrillation Mother     Dementia Mother     Heart disease Mother         heart problem    Seizures Mother     Parkinsonism Father     Arthritis Father     Atrial fibrillation Father     No Known Problems Daughter     Cri-du-chat syndrome Daughter     Colon cancer Maternal Grandmother 80    Diabetes type II Maternal Grandmother     No Known Problems Brother     No Known Problems Son     Substance Abuse Neg Hx         mother,father    Mental illness Neg Hx         mother,father    Colon polyps Neg Hx        Meds/Allergies   Current Outpatient Medications   Medication Sig Dispense Refill    acetaminophen (TYLENOL) 650 mg CR tablet Take 650 mg by mouth every 8 (eight) hours as needed for mild pain      albuterol (2.5 mg/3 mL) 0.083 % nebulizer solution Take 3 mL (2.5 mg total) by nebulization every 6 (six) hours as needed for wheezing or shortness of breath 1080 mL 3    albuterol (PROVENTIL HFA,VENTOLIN HFA) 90 mcg/act inhaler Inhale 2 puffs every 4 (four) hours as needed for wheezing 8.5 g 5    apixaban (Eliquis) 5 mg Take 1 tablet (5 mg total) by mouth 2 (two) times a day 180 tablet 3    atorvastatin (LIPITOR) 40 mg tablet Take 1 tablet (40 mg total) by mouth daily 90 tablet 3    Blood Glucose Monitoring Suppl (Contour Next One) AILYN USE AS DIRECTED 3 TIMES A DAY (Patient taking differently: USE AS DIRECTED 3 TIMES A  DAY    Once a day) 1 each 0    budesonide (PULMICORT) 0.5 mg/2 mL nebulizer solution Take 2 mL (0.5 mg total) by nebulization 2 (two) times a day Rinse mouth after use. 60 mL 3    bumetanide (BUMEX) 2 mg tablet Take 3 tablets (6 mg total) by mouth 2 (two) times a day 540 tablet 3    Contour Next Test test strip USE TO TEST BLOOD SUGAR 3 TIMES A DAY (Patient taking differently: USE TO TEST BLOOD SUGAR 3 TIMES A DAY    Once a day) 100 strip 3    [employer] Lancets Thin 26G MISC USE 3 (THREE) TIMES A DAY (Patient taking differently: Once a day) 100 each 5    dapagliflozin (Farxiga) 10 MG tablet Take 1 tablet (10 mg total) by mouth daily 90 tablet 3    diltiazem (CARDIZEM CD) 120 mg 24 hr capsule Take 1 capsule (120 mg total) by mouth daily 90 capsule 3    DULoxetine (CYMBALTA) 60 mg delayed release capsule Take 60 mg by mouth daily      Easy Comfort Pen Needles 33G X 4 MM MISC USE FOUR new needles DAILY      EPINEPHrine (EPIPEN) 0.3 mg/0.3 mL SOAJ Inject 0.3 mL (0.3 mg total) into a muscle once for 1 dose 0.6 mL 0    Ferrous Sulfate 300 MG/6.8ML SOLN Take 6.8 mL by mouth 2 (two) times a day 473 mL 0    fluticasone (FLONASE) 50 mcg/act nasal spray 1 spray into each nostril 2 (two) times a day 8 g 0    gabapentin (NEURONTIN) 300 mg capsule Take 300 mg by mouth daily Pt reports taking 300 mg TID      HumaLOG KwikPen 100 units/mL injection pen Inject 15 Units under the skin 3 (three) times a day with meals      Insulin Glargine Solostar (Lantus SoloStar) 100 UNIT/ML SOPN Inject 0.42 mL (42 Units total) as directed daily at bedtime Inject 45 units daily at bedtime (Patient taking differently: Inject 43 Units as directed daily at bedtime) 37.8 mL 1    insulin glulisine (Apidra SoloStar) 100 units/mL injection pen Inject 15 Units under the skin 3 (three) times a day with meals 15 units 3 times a day with meals (Patient not taking: Reported on 6/25/2024) 15 mL 6    Insulin Lispro-aabc, 1 U Dial, (Lyumjev KwikPen) 100 UNIT/ML SOPN  Inject 15 Units under the skin 3 (three) times a day before meals (Patient not taking: Reported on 6/17/2024) 30 mL 3    Insulin Pen Needle (BD Pen Needle Love 2nd Gen) 32G X 4 MM MISC Use daily at bedtime Use 4 new needles daily . 400 each 3    levalbuterol (XOPENEX) 1.25 mg/0.5 mL nebulizer solution Take 0.5 mL (1.25 mg total) by nebulization 2 (two) times a day 60 vial 0    loratadine (CLARITIN) 10 mg tablet Take 1 tablet (10 mg total) by mouth daily as needed for allergies 30 tablet 0    metoprolol succinate (TOPROL-XL) 100 mg 24 hr tablet Take 1 tablet (100 mg total) by mouth daily 90 tablet 3    montelukast (SINGULAIR) 10 mg tablet Take 1 tablet (10 mg total) by mouth daily at bedtime 90 tablet 3    naloxone (NARCAN) 4 mg/0.1 mL nasal spray Administer 1 spray into a nostril. If breathing does not return to normal or if breathing difficulty resumes after 2-3 minutes, give another dose in the other nostril using a new spray. 1 each 1    nystatin (MYCOSTATIN) powder Apply topically 2 (two) times a day 30 g 0    omeprazole (PriLOSEC) 40 MG capsule Take 1 capsule (40 mg total) by mouth daily 90 capsule 3    semaglutide, 1 mg/dose, (Ozempic, 1 MG/DOSE,) 4 mg/3 mL injection pen Inject 0.75 mL (1 mg total) under the skin every 7 days 3 mL 5    spironolactone (ALDACTONE) 50 mg tablet Take 2 tablets (100 mg total) by mouth daily 180 tablet 1    tiotropium-olodaterol (Stiolto Respimat) 2.5-2.5 MCG/ACT inhaler Inhale 2 puffs daily 12 g 5    traZODone (DESYREL) 150 mg tablet Take 1 tablet (150 mg total) by mouth daily at bedtime 30 tablet 5    Vitamin D, Cholecalciferol, 50 MCG (2000 UT) CAPS Take 2,000 Int'l Units/day by mouth in the morning 90 capsule 3     No current facility-administered medications for this visit.     Allergies   Allergen Reactions    Iron Dextran Swelling    Januvia [Sitagliptin] Shortness Of Breath    Jardiance [Empagliflozin] Shortness Of Breath    Clonazepam Other (See Comments)     Unknown  reaction    Codeine Itching and Other (See Comments)     itch;mary kay morphine,takes ultram @home    Adhesive [Medical Tape] Itching     itching    Effexor [Venlafaxine] Itching    Lactose - Food Allergy GI Intolerance    Oxycodone-Acetaminophen Anxiety    Oxycodone-Acetaminophen Itching and Rash     Other reaction(s): Other (See Comments)  (PERCOCET) MILD RASH, not allergic to Acetaminophen        Objective   Vitals: not currently breastfeeding.    Physical Exam  Vitals and nursing note reviewed.   Constitutional:       General: She is not in acute distress.     Appearance: Normal appearance. She is not diaphoretic.   HENT:      Head: Normocephalic and atraumatic.   Eyes:      General: No scleral icterus.     Extraocular Movements: Extraocular movements intact.      Conjunctiva/sclera: Conjunctivae normal.      Pupils: Pupils are equal, round, and reactive to light.   Cardiovascular:      Rate and Rhythm: Normal rate and regular rhythm.      Heart sounds: No murmur heard.  Pulmonary:      Effort: Pulmonary effort is normal. No respiratory distress.      Breath sounds: Decreased breath sounds present. No wheezing.      Comments: On portable oxygen concentrator  Musculoskeletal:      Cervical back: Normal range of motion.      Right lower leg: No edema.      Left lower leg: No edema.   Lymphadenopathy:      Cervical: No cervical adenopathy.   Neurological:      Mental Status: She is alert and oriented to person, place, and time. Mental status is at baseline.      Sensory: No sensory deficit.      Gait: Gait abnormal (Utilizing wheelchair).   Psychiatric:         Mood and Affect: Mood normal.         Behavior: Behavior normal.         Thought Content: Thought content normal.         The history was obtained from the review of the chart, patient.    Lab Results:   Lab Results   Component Value Date/Time    Hemoglobin A1C 8.5 (H) 09/10/2024 07:33 AM    Hemoglobin A1C 7.7 (A) 06/17/2024 01:26 PM    Hemoglobin A1C 8.5 (H)  01/22/2024 03:34 PM    Hemoglobin A1C 9.5 (H) 12/28/2023 11:17 AM    WBC 11.73 (H) 03/10/2024 04:25 AM    WBC 14.53 (H) 03/09/2024 05:07 AM    WBC 16.55 (H) 03/08/2024 02:48 PM    White Blood Cell Count 13.5 (H) 09/10/2024 07:29 AM    Hemoglobin 10.1 (L) 09/10/2024 07:29 AM    Hemoglobin 11.1 (L) 03/10/2024 04:25 AM    Hemoglobin 11.5 03/09/2024 05:07 AM    Hemoglobin 11.0 (L) 03/08/2024 02:48 PM    Hematocrit 39.8 03/10/2024 04:25 AM    Hematocrit 40.5 03/09/2024 05:07 AM    Hematocrit 39.2 03/08/2024 02:48 PM    HCT 37.4 09/10/2024 07:29 AM    MCV 69 (L) 09/10/2024 07:29 AM    MCV 67 (L) 03/10/2024 04:25 AM    MCV 66 (L) 03/09/2024 05:07 AM    MCV 66 (L) 03/08/2024 02:48 PM    Platelet Count 401 09/10/2024 07:29 AM    Platelets 328 03/10/2024 04:25 AM    Platelets 362 03/09/2024 05:07 AM    Platelets 362 03/08/2024 02:48 PM    BUN 19 09/10/2024 07:37 AM    BUN 19 09/10/2024 07:33 AM    BUN 19 09/10/2024 07:29 AM    Potassium 4.6 09/10/2024 07:37 AM    Potassium 4.7 09/10/2024 07:33 AM    Potassium 4.6 09/10/2024 07:29 AM    Chloride 93 (L) 09/10/2024 07:37 AM    Chloride 95 (L) 09/10/2024 07:33 AM    Chloride 93 (L) 09/10/2024 07:29 AM    CO2 30 (H) 09/10/2024 07:37 AM    CO2 30 (H) 09/10/2024 07:33 AM    CO2 30 (H) 09/10/2024 07:29 AM    Creatinine 0.80 09/10/2024 07:37 AM    Creatinine 0.85 09/10/2024 07:33 AM    Creatinine 0.84 09/10/2024 07:29 AM    Creatinine 0.73 03/10/2024 04:25 AM    Creatinine 0.74 03/09/2024 05:07 AM    Creatinine 0.74 03/08/2024 02:48 PM    AST 16 09/10/2024 07:33 AM    AST 17 09/10/2024 07:29 AM    AST 18 05/23/2024 01:15 PM    ALT 15 09/10/2024 07:33 AM    ALT 16 09/10/2024 07:29 AM    ALT 22 05/23/2024 01:15 PM    Protein, Total 6.2 09/10/2024 07:33 AM    Protein, Total 6.4 09/10/2024 07:29 AM    Protein, Total 6.3 05/23/2024 01:15 PM    Albumin 4.1 09/10/2024 07:33 AM    Albumin 4.2 09/10/2024 07:29 AM    Albumin 4.2 05/23/2024 01:15 PM    Albumin 4.2 03/08/2024 02:48 PM    Albumin  "4.1 03/04/2024 04:13 AM    Albumin 4.1 02/26/2024 02:08 PM    Globulin, Total 2.1 09/10/2024 07:33 AM    Globulin, Total 2.2 09/10/2024 07:29 AM    Globulin, Total 2.1 05/23/2024 01:15 PM    HDL 42 09/10/2024 07:33 AM    HDL 45 12/28/2023 11:17 AM    Triglycerides 75 09/10/2024 07:33 AM    Triglycerides 76 12/28/2023 11:17 AM         Portions of the record may have been created with voice recognition software. Occasional wrong word or \"sound a like\" substitutions may have occurred due to the inherent limitations of voice recognition software. Read the chart carefully and recognize, using context, where substitutions have occurred.    "

## 2024-09-20 NOTE — PATIENT INSTRUCTIONS
Monitor diet.  Make sure to drink plenty water throughout the day.     Continue Ozempic 1 mg weekly.     Continue Lantus 42 units daily. Increase Apidra to 20 units with breakfast, and continue with 15 units at lunch and dinner.     Call in blood sugars in about 2 weeks so we can make adjustments.     Contact the office with any concerns or questions.     Follow-up in 3 months with lab work completed prior to visit.

## 2024-10-01 ENCOUNTER — HOSPITAL ENCOUNTER (OUTPATIENT)
Dept: INFUSION CENTER | Facility: HOSPITAL | Age: 64
Discharge: HOME | End: 2024-10-01
Attending: INTERNAL MEDICINE
Payer: MEDICARE

## 2024-10-01 VITALS
OXYGEN SATURATION: 94 % | HEART RATE: 93 BPM | DIASTOLIC BLOOD PRESSURE: 66 MMHG | TEMPERATURE: 98 F | SYSTOLIC BLOOD PRESSURE: 96 MMHG | RESPIRATION RATE: 19 BRPM

## 2024-10-01 DIAGNOSIS — J44.89 ASTHMA-COPD OVERLAP SYNDROME (HCC): Primary | ICD-10-CM

## 2024-10-01 PROCEDURE — 96372 THER/PROPH/DIAG INJ SC/IM: CPT

## 2024-10-01 RX ORDER — EPINEPHRINE 1 MG/ML
0.3 INJECTION, SOLUTION, CONCENTRATE INTRAVENOUS
Status: DISCONTINUED | OUTPATIENT
Start: 2024-10-01 | End: 2024-10-04 | Stop reason: HOSPADM

## 2024-10-01 RX ORDER — EPINEPHRINE 1 MG/ML
0.3 INJECTION, SOLUTION, CONCENTRATE INTRAVENOUS
OUTPATIENT
Start: 2024-10-29

## 2024-10-01 RX ADMIN — BENRALIZUMAB 30 MG: 30 INJECTION, SOLUTION SUBCUTANEOUS at 13:04

## 2024-10-01 NOTE — PROGRESS NOTES
Pt tolerated fasenra injection without any difficulty. Band-aid applied. Pt completed her 30 min post injection observation time. Patient assisted in wc off of unit. AVS printed and given to pt.      Aware of next appt 11/26/24 @ 0100 pm

## 2024-10-02 DIAGNOSIS — N18.2 TYPE 2 DIABETES MELLITUS WITH STAGE 2 CHRONIC KIDNEY DISEASE, WITHOUT LONG-TERM CURRENT USE OF INSULIN  (HCC): ICD-10-CM

## 2024-10-02 DIAGNOSIS — N18.2 TYPE 2 DIABETES MELLITUS WITH STAGE 2 CHRONIC KIDNEY DISEASE, WITH LONG-TERM CURRENT USE OF INSULIN (HCC): ICD-10-CM

## 2024-10-02 DIAGNOSIS — E11.22 TYPE 2 DIABETES MELLITUS WITH STAGE 2 CHRONIC KIDNEY DISEASE, WITH LONG-TERM CURRENT USE OF INSULIN (HCC): ICD-10-CM

## 2024-10-02 DIAGNOSIS — Z79.4 TYPE 2 DIABETES MELLITUS WITH STAGE 2 CHRONIC KIDNEY DISEASE, WITH LONG-TERM CURRENT USE OF INSULIN (HCC): ICD-10-CM

## 2024-10-02 DIAGNOSIS — E11.22 TYPE 2 DIABETES MELLITUS WITH STAGE 2 CHRONIC KIDNEY DISEASE, WITHOUT LONG-TERM CURRENT USE OF INSULIN  (HCC): ICD-10-CM

## 2024-10-03 RX ORDER — INSULIN GLARGINE 100 [IU]/ML
INJECTION, SOLUTION SUBCUTANEOUS
Qty: 45 ML | Refills: 3 | Status: SHIPPED | OUTPATIENT
Start: 2024-10-03

## 2024-10-07 ENCOUNTER — OFFICE VISIT (OUTPATIENT)
Age: 64
End: 2024-10-07
Payer: MEDICARE

## 2024-10-07 VITALS
SYSTOLIC BLOOD PRESSURE: 104 MMHG | HEIGHT: 63 IN | DIASTOLIC BLOOD PRESSURE: 70 MMHG | WEIGHT: 260.8 LBS | TEMPERATURE: 97.7 F | HEART RATE: 90 BPM | BODY MASS INDEX: 46.21 KG/M2 | OXYGEN SATURATION: 93 %

## 2024-10-07 DIAGNOSIS — G47.33 OBSTRUCTIVE SLEEP APNEA: ICD-10-CM

## 2024-10-07 DIAGNOSIS — J44.89 ASTHMA-COPD OVERLAP SYNDROME (HCC): ICD-10-CM

## 2024-10-07 DIAGNOSIS — I48.11 LONGSTANDING PERSISTENT ATRIAL FIBRILLATION (HCC): ICD-10-CM

## 2024-10-07 DIAGNOSIS — R09.89 PULMONARY VASCULAR CONGESTION: ICD-10-CM

## 2024-10-07 DIAGNOSIS — I13.0 HYPERTENSIVE HEART AND CHRONIC KIDNEY DISEASE WITH HEART FAILURE AND STAGE 1 THROUGH STAGE 4 CHRONIC KIDNEY DISEASE, OR CHRONIC KIDNEY DISEASE (HCC): ICD-10-CM

## 2024-10-07 DIAGNOSIS — E66.01 MORBID OBESITY (HCC): ICD-10-CM

## 2024-10-07 DIAGNOSIS — J96.12 CHRONIC RESPIRATORY FAILURE WITH HYPOXIA AND HYPERCAPNIA (HCC): Primary | ICD-10-CM

## 2024-10-07 DIAGNOSIS — I27.20 PULMONARY HYPERTENSION (HCC): ICD-10-CM

## 2024-10-07 DIAGNOSIS — J96.11 CHRONIC RESPIRATORY FAILURE WITH HYPOXIA AND HYPERCAPNIA (HCC): Primary | ICD-10-CM

## 2024-10-07 PROCEDURE — 99214 OFFICE O/P EST MOD 30 MIN: CPT | Performed by: PHYSICIAN ASSISTANT

## 2024-10-07 NOTE — PROGRESS NOTES
Assessment:    1. Chronic respiratory failure with hypoxia and hypercapnia (HCC)        2. Asthma-COPD overlap syndrome (HCC)        3. Pulmonary vascular congestion        4. Hypertensive heart and chronic kidney disease with heart failure and stage 1 through stage 4 chronic kidney disease, or chronic kidney disease (HCC)        5. Obstructive sleep apnea        6. Pulmonary hypertension (HCC)        7. Longstanding persistent atrial fibrillation (HCC)        8. Morbid obesity (HCC)            Plan:   Patient presenting for follow-up, she's been stable for awhile. Overall there's been significant improvement with her breathing since starting Fasenra and having her diuretic regimen optimized  Remains on 4 L at all times.  Maintain pulse ox greater than 88%.  She is using iVAPS and benefiting from it. Using 12 hrs a night and residual AHI is 0.7.  Noncompliant with her Pulmicort nebs and Stiolto, education provided  Continue Continue Singulair & Claritin, albuterol inhaler/albuterol nebulizer treatments as needed  I recommend repeat PFTs but she refuses  Reviewed CT chest from June 2023 with lung nodules which were stable dating back to at least 2020. She does not have a significant smoking history and does not need further imaging.  She continues close follow-up with Cardiology, compliant with diuretics and daily weights  Taking Ozepmic for her diabetes unfortunately hasn't been able to lose any weight perhaps they can adjust the dose however I will defer this to Endocrine    Follow-up with Dr. Brennan in 6 months, call the office if sooner appointment needed/any change in symptoms    Subjective:     Patient ID: Mattie oJy is a 63 y.o. female.    Chief Complaint:  Mattie Joy is a 63 y.o. year old female who presents for follow-up.      She's been doing better. Earlier in the year was struggling a lot with CHF. Greatly benefiting from Fasenra. No recent exacerbations. Stopped taking her maintenance therapy  because she didn't feel like she needed them anymore.        The following portions of the patient's history were reviewed in this encounter and updated as appropriate: [unfilled]  Review of Systems   Constitutional:  Positive for fatigue.   Respiratory:  Positive for shortness of breath.          Objective:    Physical Exam  Vitals reviewed.   Constitutional:       General: She is not in acute distress.     Appearance: She is obese. She is not toxic-appearing.   HENT:      Head: Normocephalic and atraumatic.   Eyes:      General: No scleral icterus.  Cardiovascular:      Rate and Rhythm: Normal rate and regular rhythm.   Pulmonary:      Effort: Pulmonary effort is normal.      Breath sounds: No wheezing, rhonchi or rales.      Comments: Decreased breath sounds  Musculoskeletal:         General: No signs of injury.   Skin:     General: Skin is warm and dry.   Neurological:      General: No focal deficit present.      Mental Status: She is alert. Mental status is at baseline.   Psychiatric:         Mood and Affect: Mood normal.         Behavior: Behavior normal.         Lab Review:   Office Visit on 09/16/2024   Component Date Value    Diagnosis: 09/16/2024 Comment     Specimen Adequacy 09/16/2024 Comment     Clinician Provided ICD10 09/16/2024 Comment     Performed by: 09/16/2024 Comment     SL AMB . 09/16/2024 .     Note: 09/16/2024 Comment     Test Methodology: 09/16/2024 Comment     HPV Aptima 09/16/2024 Negative     Chlamydia, Nuc. Acid Amp 09/16/2024 Negative     Gonococcus bY Nucleic Ac* 09/16/2024 Negative    Office Visit on 08/26/2024   Component Date Value    White Blood Cell Count 09/10/2024 13.5 (H)     Red Blood Cell Count 09/10/2024 5.42 (H)     Hemoglobin 09/10/2024 10.1 (L)     HCT 09/10/2024 37.4     MCV 09/10/2024 69 (L)     MCH 09/10/2024 18.6 (L)     MCHC 09/10/2024 27.0 (L)     RDW 09/10/2024 17.9 (H)     Platelet Count 09/10/2024 401     Glucose, Random 09/10/2024 120 (H)     BUN  09/10/2024 19     Creatinine 09/10/2024 0.84     eGFR 09/10/2024 77     SL AMB BUN/CREATININE RA* 09/10/2024 23     Sodium 09/10/2024 138     Potassium 09/10/2024 4.6     Chloride 09/10/2024 93 (L)     CO2 09/10/2024 30 (H)     CALCIUM 09/10/2024 9.3     Protein, Total 09/10/2024 6.4     Albumin 09/10/2024 4.2     Globulin, Total 09/10/2024 2.2     TOTAL BILIRUBIN 09/10/2024 0.4     Alk Phos Isoenzymes 09/10/2024 138 (H)     AST 09/10/2024 17     ALT 09/10/2024 16     TSH 09/10/2024 4.310     Iron, Serum 09/10/2024 20 (L)     Ferritin 09/10/2024 8 (L)

## 2024-10-18 ENCOUNTER — TELEPHONE (OUTPATIENT)
Age: 64
End: 2024-10-18

## 2024-10-18 DIAGNOSIS — I50.32 CHRONIC DIASTOLIC HEART FAILURE (HCC): ICD-10-CM

## 2024-10-18 DIAGNOSIS — J44.9 COPD, SEVERE (HCC): ICD-10-CM

## 2024-10-18 DIAGNOSIS — Z79.4 TYPE 2 DIABETES MELLITUS WITH STAGE 2 CHRONIC KIDNEY DISEASE, WITH LONG-TERM CURRENT USE OF INSULIN (HCC): ICD-10-CM

## 2024-10-18 DIAGNOSIS — I50.32 CHRONIC DIASTOLIC CHF (CONGESTIVE HEART FAILURE) (HCC): Primary | ICD-10-CM

## 2024-10-18 DIAGNOSIS — N18.2 TYPE 2 DIABETES MELLITUS WITH STAGE 2 CHRONIC KIDNEY DISEASE, WITH LONG-TERM CURRENT USE OF INSULIN (HCC): ICD-10-CM

## 2024-10-18 DIAGNOSIS — E11.22 TYPE 2 DIABETES MELLITUS WITH STAGE 2 CHRONIC KIDNEY DISEASE, WITH LONG-TERM CURRENT USE OF INSULIN (HCC): ICD-10-CM

## 2024-10-18 DIAGNOSIS — I48.11 LONGSTANDING PERSISTENT ATRIAL FIBRILLATION (HCC): ICD-10-CM

## 2024-10-18 DIAGNOSIS — J45.51 SEVERE PERSISTENT ASTHMA WITH EXACERBATION: ICD-10-CM

## 2024-10-18 NOTE — TELEPHONE ENCOUNTER
Pt rep called to request an AMB Referral for [facility]. Pt was on a palliative program but the service provider is no longer active. Pt rep requesting a new order to be sent please.    Please include in referral per pt rep:  Pt name, Address, phone #, insurance info., latest dr note in chart.    Contact Info: [facility]    Phone# [phone]    Fax# [phone]

## 2024-10-28 DIAGNOSIS — G47.00 INSOMNIA, UNSPECIFIED TYPE: ICD-10-CM

## 2024-10-29 RX ORDER — TRAZODONE HYDROCHLORIDE 150 MG/1
150 TABLET ORAL
Qty: 30 TABLET | Refills: 5 | Status: SHIPPED | OUTPATIENT
Start: 2024-10-29

## 2024-11-05 ENCOUNTER — OFFICE VISIT (OUTPATIENT)
Dept: CARDIOLOGY CLINIC | Facility: CLINIC | Age: 64
End: 2024-11-05
Payer: MEDICARE

## 2024-11-05 VITALS
SYSTOLIC BLOOD PRESSURE: 104 MMHG | DIASTOLIC BLOOD PRESSURE: 64 MMHG | HEIGHT: 63 IN | BODY MASS INDEX: 45.18 KG/M2 | WEIGHT: 255 LBS | HEART RATE: 96 BPM

## 2024-11-05 DIAGNOSIS — J44.1 COPD WITH ACUTE EXACERBATION (HCC): Primary | ICD-10-CM

## 2024-11-05 DIAGNOSIS — I50.32 CHRONIC DIASTOLIC CHF (CONGESTIVE HEART FAILURE) (HCC): ICD-10-CM

## 2024-11-05 DIAGNOSIS — I48.11 LONGSTANDING PERSISTENT ATRIAL FIBRILLATION (HCC): ICD-10-CM

## 2024-11-05 PROCEDURE — 99215 OFFICE O/P EST HI 40 MIN: CPT | Performed by: INTERNAL MEDICINE

## 2024-11-05 RX ORDER — PREDNISONE 50 MG/1
50 TABLET ORAL DAILY
Qty: 5 TABLET | Refills: 0 | Status: SHIPPED | OUTPATIENT
Start: 2024-11-05

## 2024-11-05 NOTE — PROGRESS NOTES
Cardiology Outpatient Follow-Up Note - Mattie Joy 63 y.o. female MRN: [id number]      Assessment/Plan:    1. COPD with acute exacerbation (HCC)  She is dyspneic today with pursed lip breathing  Appears euvolemic though  Administered her albuterol inhaler in office today  Will give 5 days of prednisone 50 mg daily  Instructed to go to ER or call 911 if symptoms worsen  - predniSONE 50 mg tablet; Take 1 tablet (50 mg total) by mouth daily  Dispense: 5 tablet; Refill: 0    2. Chronic diastolic CHF (congestive heart failure) (HCC)  Continue current therapy  At dry weight today 255 mg daily  Bumex 6 mg BID   Farxiga 10 mg daily  Spironolactone 100 mg daily    3. Longstanding persistent atrial fibrillation (HCC)  Controlled on current therapy          We will see Mattie Joy back in 3 months.     Subjective:     HPI: Mattie Joy is a 63 y.o. year old female who presented to me initially on 9/9/21 for further evaluation of CHF with preserved LVEF, pulmonary hypertension by echo estimation, and atrial fibrillation on flecainide therapy (since committed to permanent AF). She also has chronic hypoxic respiratory failure due to asthma COPD overlap syndrome and is followed by Pulmonology.      I referred her for RHC which was performed on 9/30/21. The study showed severely elevated right (RAP 20mmHg) and left (PCWP 30mmHg) sided filling pressure. There was severe post-capillary pulmonary hypertension (PAP 80/38/52) with a PVR of 2.7WU. Cardiac output was normal. The study was suggestive of primarily group II pulmonary hypertension due to left sided heart disease and elevated filling pressure.     She has started a special therapy for eosinophilic asthma, on injectable biologic, but reports minimal to no improvement.      Weight today 255 lbs again. We did try to drop bumex to 4 mg BID but her symptoms worsened and we returned to 6 mg BID  Today reporting worsened dyspnea       Cardiac Testing:    EKGs, personally  "reviewed:  EKG in the office 3/29/2023 shows normal sinus rhythm, 87 bpm, normal axis, normal intervals.  Normal study.    EKG 3/8/24 - AF with RVR, 131 bpm. Abnormal study.    Relevant Labs & Results:  BMP 2/6/2023 reviewed--K4.2, creatinine 0.65--on torsemide 60 mg twice daily and potassium 20 mEq twice daily    BMP 3/6/23 - K 4.4, Cr 0.70  BNP 2/26/24 - 177  CBC 3/5/24 - Hgb 10.5 g/dL    CMP 5/23/2024-- Na 129, K5.1, creatinine 1.37, GFR 43    BMP 9/10/24 - K 4.6, Na 137  HbA1c 9/10/24 - 8.5%  CBC 9/10/24 - Hgb 10.1 g/dL    ROS:  Review of Systems:  Review of Systems    Objective:     Vitals:   Vitals:    11/05/24 1305   BP: 104/64   BP Location: Left arm   Patient Position: Sitting   Cuff Size: Standard   Pulse: 96   Weight: 116 kg (255 lb)   Height: 5' 3\" (1.6 m)    Body surface area is 2.15 meters squared.  Wt Readings from Last 3 Encounters:   11/05/24 116 kg (255 lb)   10/07/24 118 kg (260 lb 12.8 oz)   09/20/24 115 kg (254 lb 3.2 oz)       Physical Exam:    General: Mattie Joy is a chronically ill and morbidly obese female, in mild respiratory distress on O2 concentration  HEENT: moist mucous membranes, EOMI  Neck:  No JVD, supple, trachea midline  Cardiovascular: unremarkable S1/S2, regular rate and rhythm, no murmurs, rubs or gallops  Pulmonary:  pursed lip breathing, diminished breath sounds  Abdomen: soft and nondistended  Extremities: + lower extremity edema. Warm and well perfused extremities.  Neuro: no focal motor deficits, AAOx3 (person, place, time)  Psych: Normal mood and affect, cooperative          Medications (at the START of this encounter):  Outpatient Medications Prior to Visit   Medication Sig Dispense Refill    acetaminophen (TYLENOL) 650 mg CR tablet Take 650 mg by mouth every 8 (eight) hours as needed for mild pain      albuterol (2.5 mg/3 mL) 0.083 % nebulizer solution Take 3 mL (2.5 mg total) by nebulization every 6 (six) hours as needed for wheezing or shortness of breath 1080 mL " 3    albuterol (PROVENTIL HFA,VENTOLIN HFA) 90 mcg/act inhaler Inhale 2 puffs every 4 (four) hours as needed for wheezing 8.5 g 5    apixaban (Eliquis) 5 mg Take 1 tablet (5 mg total) by mouth 2 (two) times a day 180 tablet 3    atorvastatin (LIPITOR) 40 mg tablet Take 1 tablet (40 mg total) by mouth daily 90 tablet 3    Blood Glucose Monitoring Suppl (Contour Next One) AILYN USE AS DIRECTED 3 TIMES A DAY (Patient taking differently: USE AS DIRECTED 3 TIMES A DAY    Once a day) 1 each 0    budesonide (PULMICORT) 0.5 mg/2 mL nebulizer solution Take 2 mL (0.5 mg total) by nebulization 2 (two) times a day Rinse mouth after use. 60 mL 3    bumetanide (BUMEX) 2 mg tablet Take 3 tablets (6 mg total) by mouth 2 (two) times a day 540 tablet 3    Contour Next Test test strip USE TO TEST BLOOD SUGAR 3 TIMES A DAY (Patient taking differently: USE TO TEST BLOOD SUGAR 3 TIMES A DAY    Once a day) 100 strip 3    [employer] Lancets Thin 26G MISC USE 3 (THREE) TIMES A DAY (Patient taking differently: Once a day) 100 each 5    dapagliflozin (Farxiga) 10 MG tablet Take 1 tablet (10 mg total) by mouth daily 90 tablet 3    diltiazem (CARDIZEM CD) 120 mg 24 hr capsule Take 1 capsule (120 mg total) by mouth daily 90 capsule 3    DULoxetine (CYMBALTA) 60 mg delayed release capsule Take 60 mg by mouth daily      Easy Comfort Pen Needles 33G X 4 MM MISC USE FOUR new needles DAILY      EPINEPHrine (EPIPEN) 0.3 mg/0.3 mL SOAJ Inject 0.3 mL (0.3 mg total) into a muscle once for 1 dose 0.6 mL 0    Ferrous Sulfate 300 MG/6.8ML SOLN Take 6.8 mL by mouth 2 (two) times a day 473 mL 0    fluticasone (FLONASE) 50 mcg/act nasal spray 1 spray into each nostril 2 (two) times a day 8 g 0    gabapentin (NEURONTIN) 300 mg capsule Take 300 mg by mouth daily Pt reports taking 300 mg TID      HumaLOG KwikPen 100 units/mL injection pen Inject 15 Units under the skin 3 (three) times a day with meals      Insulin Glargine Solostar (Lantus SoloStar) 100 UNIT/ML SOPN  Inject 42 units daily at bedtime 45 mL 3    insulin glulisine (Apidra SoloStar) 100 units/mL injection pen 20 units with Breakfast and 15 units lunch and dinner 15 mL 6    Insulin Lispro-aabc, 1 U Dial, (Lyumjeemmy KwikPen) 100 UNIT/ML SOPN Inject 15 Units under the skin 3 (three) times a day before meals (Patient not taking: Reported on 6/17/2024) 30 mL 3    Insulin Pen Needle (BD Pen Needle Love 2nd Gen) 32G X 4 MM MISC Use daily at bedtime Use 4 new needles daily . 400 each 3    levalbuterol (XOPENEX) 1.25 mg/0.5 mL nebulizer solution Take 0.5 mL (1.25 mg total) by nebulization 2 (two) times a day 60 vial 0    loratadine (CLARITIN) 10 mg tablet Take 1 tablet (10 mg total) by mouth daily as needed for allergies 30 tablet 0    metoprolol succinate (TOPROL-XL) 100 mg 24 hr tablet Take 1 tablet (100 mg total) by mouth daily 90 tablet 3    montelukast (SINGULAIR) 10 mg tablet Take 1 tablet (10 mg total) by mouth daily at bedtime 90 tablet 3    naloxone (NARCAN) 4 mg/0.1 mL nasal spray Administer 1 spray into a nostril. If breathing does not return to normal or if breathing difficulty resumes after 2-3 minutes, give another dose in the other nostril using a new spray. 1 each 1    nystatin (MYCOSTATIN) powder Apply topically 2 (two) times a day 30 g 0    omeprazole (PriLOSEC) 40 MG capsule Take 1 capsule (40 mg total) by mouth daily 90 capsule 3    semaglutide, 1 mg/dose, (Ozempic, 1 MG/DOSE,) 4 mg/3 mL injection pen Inject 0.75 mL (1 mg total) under the skin every 7 days 3 mL 5    spironolactone (ALDACTONE) 50 mg tablet Take 2 tablets (100 mg total) by mouth daily 180 tablet 1    tiotropium-olodaterol (Stiolto Respimat) 2.5-2.5 MCG/ACT inhaler Inhale 2 puffs daily 12 g 5    traZODone (DESYREL) 150 mg tablet TAKE ONE TABLET BY MOUTH AT BEDTIME 30 tablet 5    Vitamin D, Cholecalciferol, 50 MCG (2000 UT) CAPS Take 2,000 Int'l Units/day by mouth in the morning 90 capsule 3     No facility-administered medications prior to  "visit.         This note was completed in part utilizing Dragon [employer] One voice recognition software. Grammatical errors, random word insertion, spelling mistakes, occasional wrong word or \"sound-alike\" substitutions and incomplete sentences may be an occasional consequence of the system secondary to software limitations, ambient noise and hardware issues. At the time of dictation, efforts were made to edit, clarify and /or correct errors.  Please read the chart carefully and recognize, using context, where substitutions have occurred.  If you have any questions or concerns about the context, text or information contained within the body of this dictation, please contact myself, the provider, for further clarification.    "

## 2024-11-18 ENCOUNTER — HOSPITAL ENCOUNTER (OUTPATIENT)
Dept: MAMMOGRAPHY | Facility: IMAGING CENTER | Age: 64
Discharge: HOME | End: 2024-11-18
Payer: MEDICARE

## 2024-11-18 VITALS — WEIGHT: 255 LBS | BODY MASS INDEX: 45.18 KG/M2 | HEIGHT: 63 IN

## 2024-11-18 DIAGNOSIS — N18.2 TYPE 2 DIABETES MELLITUS WITH STAGE 2 CHRONIC KIDNEY DISEASE, WITH LONG-TERM CURRENT USE OF INSULIN (HCC): ICD-10-CM

## 2024-11-18 DIAGNOSIS — Z12.31 ENCOUNTER FOR SCREENING MAMMOGRAM FOR MALIGNANT NEOPLASM OF BREAST: ICD-10-CM

## 2024-11-18 DIAGNOSIS — Z79.4 TYPE 2 DIABETES MELLITUS WITH STAGE 2 CHRONIC KIDNEY DISEASE, WITH LONG-TERM CURRENT USE OF INSULIN (HCC): ICD-10-CM

## 2024-11-18 DIAGNOSIS — E11.22 TYPE 2 DIABETES MELLITUS WITH STAGE 2 CHRONIC KIDNEY DISEASE, WITH LONG-TERM CURRENT USE OF INSULIN (HCC): ICD-10-CM

## 2024-11-18 PROCEDURE — 77067 SCR MAMMO BI INCL CAD: CPT

## 2024-11-18 PROCEDURE — 77063 BREAST TOMOSYNTHESIS BI: CPT

## 2024-11-19 RX ORDER — INSULIN LISPRO 100 [IU]/ML
INJECTION, SOLUTION INTRAVENOUS; SUBCUTANEOUS
Qty: 30 ML | Refills: 1 | Status: SHIPPED | OUTPATIENT
Start: 2024-11-19

## 2024-11-26 ENCOUNTER — HOSPITAL ENCOUNTER (OUTPATIENT)
Dept: INFUSION CENTER | Facility: HOSPITAL | Age: 64
Discharge: HOME | End: 2024-11-26
Attending: INTERNAL MEDICINE
Payer: MEDICARE

## 2024-11-26 VITALS
HEART RATE: 88 BPM | TEMPERATURE: 96.8 F | SYSTOLIC BLOOD PRESSURE: 125 MMHG | RESPIRATION RATE: 20 BRPM | DIASTOLIC BLOOD PRESSURE: 60 MMHG

## 2024-11-26 DIAGNOSIS — J44.89 ASTHMA-COPD OVERLAP SYNDROME (HCC): Primary | ICD-10-CM

## 2024-11-26 PROCEDURE — 96372 THER/PROPH/DIAG INJ SC/IM: CPT

## 2024-11-26 RX ORDER — EPINEPHRINE 1 MG/ML
0.3 INJECTION, SOLUTION, CONCENTRATE INTRAVENOUS
Status: DISCONTINUED | OUTPATIENT
Start: 2024-11-26 | End: 2024-11-29 | Stop reason: HOSPADM

## 2024-11-26 RX ORDER — EPINEPHRINE 1 MG/ML
0.3 INJECTION, SOLUTION, CONCENTRATE INTRAVENOUS
OUTPATIENT
Start: 2024-12-24

## 2024-11-26 RX ADMIN — BENRALIZUMAB 30 MG: 30 INJECTION, SOLUTION SUBCUTANEOUS at 13:02

## 2024-11-26 NOTE — PROGRESS NOTES
Mattie Joy  tolerated treatment well with no complications.      Mattie Joy is aware of future appt on 1/21 at 1300.     AVS printed and given to Mattie Joy:  Yes

## 2024-12-02 DIAGNOSIS — F32.1 CURRENT MODERATE EPISODE OF MAJOR DEPRESSIVE DISORDER WITHOUT PRIOR EPISODE (HCC): Primary | ICD-10-CM

## 2024-12-02 RX ORDER — DULOXETIN HYDROCHLORIDE 60 MG/1
60 CAPSULE, DELAYED RELEASE ORAL DAILY
Qty: 30 CAPSULE | Refills: 5 | Status: SHIPPED | OUTPATIENT
Start: 2024-12-02

## 2024-12-06 ENCOUNTER — OFFICE VISIT (OUTPATIENT)
Dept: FAMILY MEDICINE CLINIC | Facility: HOSPITAL | Age: 64
End: 2024-12-06
Payer: MEDICARE

## 2024-12-06 VITALS
DIASTOLIC BLOOD PRESSURE: 60 MMHG | TEMPERATURE: 98 F | SYSTOLIC BLOOD PRESSURE: 99 MMHG | WEIGHT: 260.1 LBS | HEART RATE: 95 BPM | HEIGHT: 63 IN | BODY MASS INDEX: 46.09 KG/M2 | OXYGEN SATURATION: 96 %

## 2024-12-06 DIAGNOSIS — I13.0 HYPERTENSIVE HEART AND CHRONIC KIDNEY DISEASE WITH HEART FAILURE AND STAGE 1 THROUGH STAGE 4 CHRONIC KIDNEY DISEASE, OR CHRONIC KIDNEY DISEASE (HCC): Primary | ICD-10-CM

## 2024-12-06 DIAGNOSIS — J44.9 COPD, SEVERE (HCC): ICD-10-CM

## 2024-12-06 DIAGNOSIS — R54 FRAILTY: ICD-10-CM

## 2024-12-06 DIAGNOSIS — I48.11 LONGSTANDING PERSISTENT ATRIAL FIBRILLATION (HCC): ICD-10-CM

## 2024-12-06 DIAGNOSIS — Z74.09 IMPAIRED FUNCTIONAL MOBILITY, BALANCE, GAIT, AND ENDURANCE: ICD-10-CM

## 2024-12-06 DIAGNOSIS — E11.22 TYPE 2 DIABETES MELLITUS WITH STAGE 2 CHRONIC KIDNEY DISEASE, WITH LONG-TERM CURRENT USE OF INSULIN (HCC): ICD-10-CM

## 2024-12-06 DIAGNOSIS — N18.2 TYPE 2 DIABETES MELLITUS WITH STAGE 2 CHRONIC KIDNEY DISEASE, WITH LONG-TERM CURRENT USE OF INSULIN (HCC): ICD-10-CM

## 2024-12-06 DIAGNOSIS — E78.00 HYPERCHOLESTEROLEMIA: ICD-10-CM

## 2024-12-06 DIAGNOSIS — Z99.3 USES WHEELCHAIR: ICD-10-CM

## 2024-12-06 DIAGNOSIS — Z79.4 TYPE 2 DIABETES MELLITUS WITH STAGE 2 CHRONIC KIDNEY DISEASE, WITH LONG-TERM CURRENT USE OF INSULIN (HCC): ICD-10-CM

## 2024-12-06 DIAGNOSIS — F32.1 CURRENT MODERATE EPISODE OF MAJOR DEPRESSIVE DISORDER WITHOUT PRIOR EPISODE (HCC): ICD-10-CM

## 2024-12-06 DIAGNOSIS — I50.32 CHRONIC DIASTOLIC CHF (CONGESTIVE HEART FAILURE) (HCC): ICD-10-CM

## 2024-12-06 PROCEDURE — 99214 OFFICE O/P EST MOD 30 MIN: CPT | Performed by: FAMILY MEDICINE

## 2024-12-06 PROCEDURE — G2211 COMPLEX E/M VISIT ADD ON: HCPCS | Performed by: FAMILY MEDICINE

## 2024-12-06 NOTE — ASSESSMENT & PLAN NOTE
Wt Readings from Last 3 Encounters:   12/06/24 118 kg (260 lb 1.6 oz)   11/18/24 116 kg (255 lb)   11/05/24 116 kg (255 lb)     Currently euvolemic.  No significant shortness of breath.          Orders:    Ambulatory referral to Physical Therapy; Future

## 2024-12-06 NOTE — ASSESSMENT & PLAN NOTE
Rate is controlled.  No current significant symptoms.  Continue follow-up with cardiology.  Continue with anticoagulation no active bleeding.  Orders:    Ambulatory referral to Physical Therapy; Future

## 2024-12-06 NOTE — ASSESSMENT & PLAN NOTE
He has had impaired mobility and endurance.  Secondary to emphysema and congestive heart failure.  She has osteoarthritis in different joints.    To enhance mobility and quality of life I think she would benefit from a motorized scooter.  Referral to physical therapy for motorized scooter evaluation.      Orders:    Ambulatory referral to Physical Therapy; Future

## 2024-12-06 NOTE — ASSESSMENT & PLAN NOTE
Wt Readings from Last 3 Encounters:   12/06/24 118 kg (260 lb 1.6 oz)   11/18/24 116 kg (255 lb)   11/05/24 116 kg (255 lb)     Currently stable.  Continue follow-up with nephrology.

## 2024-12-06 NOTE — ASSESSMENT & PLAN NOTE
Lab Results   Component Value Date    HGBA1C 8.5 (H) 09/10/2024   Uncontrolled but stable.  Ongoing follow-up with endocrinology.

## 2024-12-06 NOTE — PROGRESS NOTES
Name: Mattie Joy      : 1960      MRN: [id number]  Encounter Provider: Laith Olivares MD  Encounter Date: 2024   Encounter department: [facility] [address]   :  Assessment & Plan  Type 2 diabetes mellitus with stage 2 chronic kidney disease, with long-term current use of insulin (HCC)    Lab Results   Component Value Date    HGBA1C 8.5 (H) 09/10/2024   Uncontrolled but stable.  Ongoing follow-up with endocrinology.         Hypercholesterolemia  Stable.  Continue on statin.       Hypertensive heart and chronic kidney disease with heart failure and stage 1 through stage 4 chronic kidney disease, or chronic kidney disease (HCC)  Wt Readings from Last 3 Encounters:   24 118 kg (260 lb 1.6 oz)   24 116 kg (255 lb)   24 116 kg (255 lb)     Currently stable.  Continue follow-up with nephrology.               Longstanding persistent atrial fibrillation (HCC)  Rate is controlled.  No current significant symptoms.  Continue follow-up with cardiology.  Continue with anticoagulation no active bleeding.  Orders:    Ambulatory referral to Physical Therapy; Future    Current moderate episode of major depressive disorder without prior episode (HCC)  Currently stable.  No issues with medication.  No suicidal ideation homicidal ideation.         Chronic diastolic CHF (congestive heart failure) (HCC)  Wt Readings from Last 3 Encounters:   24 118 kg (260 lb 1.6 oz)   24 116 kg (255 lb)   24 116 kg (255 lb)     Currently euvolemic.  No significant shortness of breath.          Orders:    Ambulatory referral to Physical Therapy; Future    COPD, severe (HCC)  Severe COPD.  Follow-up with pulmonology.  Remains on 4 L of oxygen.       Frailty    Orders:    Ambulatory referral to Physical Therapy; Future    Uses wheelchair    Orders:    Ambulatory referral to Physical Therapy; Future    Impaired functional mobility, balance, gait, and endurance  He has had  "impaired mobility and endurance.  Secondary to emphysema and congestive heart failure.  She has osteoarthritis in different joints.    To enhance mobility and quality of life I think she would benefit from a motorized scooter.  Referral to physical therapy for motorized scooter evaluation.      Orders:    Ambulatory referral to Physical Therapy; Future           History of Present Illness     Ivy is here for follow-up of chronic conditions.  Patient with multiple chronic conditions to include congestive heart failure, atrial fibrillation, severe COPD, O2 dependent, major depressive disorder.  Today she is overall feeling well.  No significant depression.  No change in shortness of breath.  No chest pain or lightheadedness.  No issues with her current medications.  Has had regular follow-up with cardiology and pulmonology.      Review of Systems   Constitutional: Negative.  Negative for activity change, appetite change, chills, diaphoresis, fatigue and fever.   HENT:  Negative for congestion, facial swelling and sore throat.    Respiratory: Negative.  Negative for apnea, cough, chest tightness and shortness of breath.    Cardiovascular: Negative.  Negative for chest pain and palpitations.   Gastrointestinal: Negative.  Negative for abdominal distention, abdominal pain, blood in stool, constipation, diarrhea and nausea.   Genitourinary: Negative.  Negative for difficulty urinating, dysuria, flank pain and frequency.          Objective   BP 99/60 (BP Location: Left arm, Patient Position: Sitting, Cuff Size: Large)   Pulse 95   Temp 98 °F (36.7 °C) (Tympanic)   Ht 5' 3\" (1.6 m)   Wt 118 kg (260 lb 1.6 oz)   SpO2 96%   BMI 46.07 kg/m²      Physical Exam  Vitals and nursing note reviewed.   Constitutional:       General: She is not in acute distress.     Appearance: Normal appearance. She is well-developed. She is not ill-appearing or diaphoretic.   HENT:      Head: Normocephalic and atraumatic.      Right Ear: " External ear normal.      Left Ear: External ear normal.      Nose: Nose normal.      Mouth/Throat:      Mouth: Mucous membranes are moist.   Eyes:      Conjunctiva/sclera: Conjunctivae normal.      Pupils: Pupils are equal, round, and reactive to light.   Cardiovascular:      Rate and Rhythm: Normal rate and regular rhythm.      Heart sounds: Normal heart sounds.   Pulmonary:      Effort: Pulmonary effort is normal.      Breath sounds: Normal breath sounds.   Abdominal:      General: Bowel sounds are normal.      Palpations: Abdomen is soft.   Musculoskeletal:      Cervical back: Normal range of motion and neck supple.   Skin:     General: Skin is warm and dry.   Neurological:      General: No focal deficit present.      Mental Status: She is alert and oriented to person, place, and time.   Psychiatric:         Mood and Affect: Mood normal.         Behavior: Behavior normal.

## 2024-12-31 ENCOUNTER — TELEPHONE (OUTPATIENT)
Age: 64
End: 2024-12-31

## 2024-12-31 DIAGNOSIS — E11.22 TYPE 2 DIABETES MELLITUS WITH STAGE 2 CHRONIC KIDNEY DISEASE, WITH LONG-TERM CURRENT USE OF INSULIN (HCC): ICD-10-CM

## 2024-12-31 DIAGNOSIS — N18.2 TYPE 2 DIABETES MELLITUS WITH STAGE 2 CHRONIC KIDNEY DISEASE, WITH LONG-TERM CURRENT USE OF INSULIN (HCC): ICD-10-CM

## 2024-12-31 DIAGNOSIS — Z79.4 TYPE 2 DIABETES MELLITUS WITH STAGE 2 CHRONIC KIDNEY DISEASE, WITH LONG-TERM CURRENT USE OF INSULIN (HCC): ICD-10-CM

## 2025-01-02 DIAGNOSIS — Z79.4 TYPE 2 DIABETES MELLITUS WITH STAGE 2 CHRONIC KIDNEY DISEASE, WITH LONG-TERM CURRENT USE OF INSULIN (HCC): ICD-10-CM

## 2025-01-02 DIAGNOSIS — E11.22 TYPE 2 DIABETES MELLITUS WITH STAGE 2 CHRONIC KIDNEY DISEASE, WITH LONG-TERM CURRENT USE OF INSULIN (HCC): ICD-10-CM

## 2025-01-02 DIAGNOSIS — N18.2 TYPE 2 DIABETES MELLITUS WITH STAGE 2 CHRONIC KIDNEY DISEASE, WITH LONG-TERM CURRENT USE OF INSULIN (HCC): ICD-10-CM

## 2025-01-02 RX ORDER — SEMAGLUTIDE 1.34 MG/ML
INJECTION, SOLUTION SUBCUTANEOUS
Qty: 3 ML | Refills: 5 | Status: SHIPPED | OUTPATIENT
Start: 2025-01-02

## 2025-01-03 ENCOUNTER — TELEPHONE (OUTPATIENT)
Dept: ENDOCRINOLOGY | Facility: HOSPITAL | Age: 65
End: 2025-01-03

## 2025-01-03 RX ORDER — EXENATIDE 2 MG/.85ML
INJECTION, SUSPENSION, EXTENDED RELEASE SUBCUTANEOUS
Refills: 0 | OUTPATIENT
Start: 2025-01-03

## 2025-01-03 NOTE — TELEPHONE ENCOUNTER
PA for  (Ozempic, 1 MG/DOSE,) 4 mg/3 mL  DENIED    Reason:(Screenshot if applicable)        Message sent to office clinical pool Yes    Denial letter scanned into Media Yes    Appeal started No (Provider will need to decide if appeal is warranted and send clinical documentation to Prior Authorization Team for initiation.)    **Please follow up with your patient regarding denial and next steps**

## 2025-01-03 NOTE — TELEPHONE ENCOUNTER
PA for (Ozempic, 1 MG/DOSE,) 4 mg/3 mL SUBMITTED to Medicare     via      [x][employer]-Case ID # [id number]    [x]PA sent as URGENT    All office notes, labs and other pertaining documents and studies sent. Clinical questions answered. Awaiting determination from insurance company.     Turnaround time for your insurance to make a decision on your Prior Authorization can take 7-21 business days.

## 2025-01-03 NOTE — TELEPHONE ENCOUNTER
Reason for call:   [x] Prior Auth  [] Other:     Caller:  [] Patient  [x] Pharmacy   [facility] - [address]  [phone]    Medication:   semaglutide, 1 mg/dose, (Ozempic, 1 MG/DOSE,) 4 mg/3 mL injection pen      Dose/Frequency: INJECT 0.75 ML (1 MG TOTAL) UNDER THE SKIN EVERY 7 DAYS     Quantity: 3 mL    Ordering Provider:   [] PCP/Provider -   [x] Speciality/Provider - Endo/Lobito Alfredo

## 2025-01-08 DIAGNOSIS — E66.9 TYPE 2 DIABETES MELLITUS WITH OBESITY  (HCC): ICD-10-CM

## 2025-01-08 DIAGNOSIS — E78.00 HYPERCHOLESTEROLEMIA: Primary | ICD-10-CM

## 2025-01-08 DIAGNOSIS — E11.69 TYPE 2 DIABETES MELLITUS WITH OBESITY  (HCC): ICD-10-CM

## 2025-01-08 NOTE — PLAN OF CARE
Problem: PAIN - ADULT  Goal: Verbalizes/displays adequate comfort level or baseline comfort level  Description: Interventions:  - Encourage patient to monitor pain and request assistance  - Assess pain using appropriate pain scale  - Administer analgesics based on type and severity of pain and evaluate response  - Implement non-pharmacological measures as appropriate and evaluate response  - Consider cultural and social influences on pain and pain management  - Notify physician/advanced practitioner if interventions unsuccessful or patient reports new pain  Outcome: Progressing     Problem: INFECTION - ADULT  Goal: Absence or prevention of progression during hospitalization  Description: INTERVENTIONS:  - Assess and monitor for signs and symptoms of infection  - Monitor lab/diagnostic results  - Monitor all insertion sites, i.e. indwelling lines, tubes, and drains  - Monitor endotracheal if appropriate and nasal secretions for changes in amount and color  - [place] appropriate cooling/warming therapies per order  - Administer medications as ordered  - Instruct and encourage patient and family to use good hand hygiene technique  - Identify and instruct in appropriate isolation precautions for identified infection/condition  Outcome: Progressing     Problem: SAFETY ADULT  Goal: Patient will remain free of falls  Description: INTERVENTIONS:  - Educate patient/family on patient safety including physical limitations  - Instruct patient to call for assistance with activity   - Consult OT/PT to assist with strengthening/mobility   - Keep Call bell within reach  - Keep bed low and locked with side rails adjusted as appropriate  - Keep care items and personal belongings within reach  - Initiate and maintain comfort rounds  - Make Fall Risk Sign visible to staff  - Offer Toileting every 2 Hours, in advance of need  - Initiate/Maintain bed/ chair alarm  - Obtain necessary fall risk management equipment: n/a  - Apply yellow socks and bracelet for high fall risk patients  - Consider moving patient to room near nurses station  Outcome: Progressing  Goal: Maintain or return to baseline ADL function  Description: INTERVENTIONS:  -  Assess patient's ability to carry out ADLs; assess patient's baseline for ADL function and identify physical deficits which impact ability to perform ADLs (bathing, care of mouth/teeth, toileting, grooming, dressing, etc.)  - Assess/evaluate cause of self-care deficits   - Assess range of motion  - Assess patient's mobility; develop plan if impaired  - Assess patient's need for assistive devices and provide as appropriate  - Encourage maximum independence but intervene and supervise when necessary  - Involve family in performance of ADLs  - Assess for home care needs following discharge   - Consider OT consult to assist with ADL evaluation and planning for discharge  - Provide patient education as appropriate  Outcome: Progressing  Goal: Maintains/Returns to pre admission functional level  Description: INTERVENTIONS:  - Perform BMAT or MOVE assessment daily.   - Set and communicate daily mobility goal to care team and patient/family/caregiver. - Collaborate with rehabilitation services on mobility goals if consulted  - Perform Range of Motion 3 times a day. - Reposition patient every 2 hours.   - Dangle patient 3 times a day  - Stand patient 3 times a day  - Ambulate patient 3 times a day  - Out of bed to chair 3 times a day   - Out of bed for meals 3 times a day  - Out of bed for toileting  - Record patient progress and toleration of activity level   Outcome: Progressing     Problem: DISCHARGE PLANNING  Goal: Discharge to home or other facility with appropriate resources  Description: INTERVENTIONS:  - Identify barriers to discharge w/patient and caregiver  - Arrange for needed discharge resources and transportation as appropriate  - Identify discharge learning needs (meds, wound care, etc.)  - Arrange for interpretive services to assist at discharge as needed  - Refer to Case Management Department for coordinating discharge planning if the patient needs post-hospital services based on physician/advanced practitioner order or complex needs related to functional status, cognitive ability, or social support system  Outcome: Progressing     Problem: Knowledge Deficit  Goal: Patient/family/caregiver demonstrates understanding of disease process, treatment plan, medications, and discharge instructions  Description: Complete learning assessment and assess knowledge base.   Interventions:  - Provide teaching at level of understanding  - Provide teaching via preferred learning methods  Outcome: Progressing     Problem: RESPIRATORY - ADULT  Goal: Achieves optimal ventilation and oxygenation  Description: INTERVENTIONS:  - Assess for changes in respiratory status  - Assess for changes in mentation and behavior  - Position to facilitate oxygenation and minimize respiratory effort  - Oxygen administered by appropriate delivery if ordered  - Initiate smoking cessation education as indicated  - Encourage broncho-pulmonary hygiene including cough, deep breathe, Incentive Spirometry  - Assess the need for suctioning and aspirate as needed  - Assess and instruct to report SOB or any respiratory difficulty  - Respiratory Therapy support as indicated  Outcome: Progressing     Problem: METABOLIC, FLUID AND ELECTROLYTES - ADULT  Goal: Electrolytes maintained within normal limits  Description: INTERVENTIONS:  - Monitor labs and assess patient for signs and symptoms of electrolyte imbalances  - Administer electrolyte replacement as ordered  - Monitor response to electrolyte replacements, including repeat lab results as appropriate  - Instruct patient on fluid and nutrition as appropriate  Outcome: Progressing  Goal: Fluid balance maintained  Description: INTERVENTIONS:  - Monitor labs   - Monitor I/O and WT  - Instruct patient on fluid and nutrition as appropriate  - Assess for signs & symptoms of volume excess or deficit  Outcome: Progressing  Goal: Glucose maintained within target range  Description: INTERVENTIONS:  - Monitor Blood Glucose as ordered  - Assess for signs and symptoms of hyperglycemia and hypoglycemia  - Administer ordered medications to maintain glucose within target range  - Assess nutritional intake and initiate nutrition service referral as needed  Outcome: Progressing     Problem: Nutrition/Hydration-ADULT  Goal: Nutrient/Hydration intake appropriate for improving, restoring or maintaining nutritional needs  Description: Monitor and assess patient's nutrition/hydration status for malnutrition. Collaborate with interdisciplinary team and initiate plan and interventions as ordered. Monitor patient's weight and dietary intake as ordered or per policy. Utilize nutrition screening tool and intervene as necessary. Determine patient's food preferences and provide high-protein, high-caloric foods as appropriate.      INTERVENTIONS:  - Monitor oral intake, urinary output, labs, and treatment plans  - Assess nutrition and hydration status and recommend course of action  - Evaluate amount of meals eaten  - Assist patient with eating if necessary   - Allow adequate time for meals  - Recommend/ encourage appropriate diets, oral nutritional supplements, and vitamin/mineral supplements  - Order, calculate, and assess calorie counts as needed  - Recommend, monitor, and adjust tube feedings and TPN/PPN based on assessed needs  - Assess need for intravenous fluids  - Provide specific nutrition/hydration education as appropriate  - Include patient/family/caregiver in decisions related to nutrition  Outcome: Progressing     Problem: CARDIOVASCULAR - ADULT  Goal: Maintains optimal cardiac output and hemodynamic stability  Description: INTERVENTIONS:  - Monitor I/O, vital signs and rhythm  - Monitor for S/S and trends of decreased cardiac output  - Administer and titrate ordered vasoactive medications to optimize hemodynamic stability  - Assess quality of pulses, skin color and temperature  - Assess for signs of decreased coronary artery perfusion  - Instruct patient to report change in severity of symptoms  Outcome: Progressing     Problem: GENITOURINARY - ADULT  Goal: Maintains or returns to baseline urinary function  Description: INTERVENTIONS:  - Assess urinary function  - Encourage oral fluids to ensure adequate hydration if ordered  - Administer IV fluids as ordered to ensure adequate hydration  - Administer ordered medications as needed  - Offer frequent toileting  - Follow urinary retention protocol if ordered  Outcome: Progressing     Problem: SKIN/TISSUE INTEGRITY - ADULT  Goal: Skin Integrity remains intact(Skin Breakdown Prevention)  Description: Assess:  -Perform Tj assessment every shift  -Clean and moisturize skin every shift  -Inspect skin when repositioning, toileting, and assisting with ADLS  -Assess under medical devices such as nasal Cannula every shift  -Assess extremities for adequate circulation and sensation     Bed Management:  -Have minimal linens on bed & keep smooth, unwrinkled  -Change linens as needed when moist or perspiring  -Avoid sitting or lying in one position for more than 2 hours while in bed  -Keep HOB at 30 degrees     Toileting:  -Offer bedside commode  -Assess for incontinence every shift  -Use incontinent care products after each incontinent episode such as moisture barrier    Activity:  -Mobilize patient 3 times a day  -Encourage activity and walks on unit  -Encourage or provide ROM exercises   -Turn and reposition patient every 2 Hours  -Use appropriate equipment to lift or move patient in bed  -Instruct/ Assist with weight shifting every 15 minutes when out of bed in chair  -Consider limitation of chair time 4 hour intervals    Skin Care:  -Avoid use of baby powder, tape, friction and shearing, hot water or constrictive clothing  -Relieve pressure over bony prominences using wedge  -Do not massage red bony areas    Next Steps:  -Teach patient strategies to minimize risks such as pneumonia  -Consider consults to  interdisciplinary teams such as wound care  Outcome: Progressing     Problem: HEMATOLOGIC - ADULT  Goal: Maintains hematologic stability  Description: INTERVENTIONS  - Assess for signs and symptoms of bleeding or hemorrhage  - Monitor labs  - Administer supportive blood products/factors as ordered and appropriate  Outcome: Progressing     Problem: MUSCULOSKELETAL - ADULT  Goal: Maintain or return mobility to safest level of function  Description: INTERVENTIONS:  - Assess patient's ability to carry out ADLs; assess patient's baseline for ADL function and identify physical deficits which impact ability to perform ADLs (bathing, care of mouth/teeth, toileting, grooming, dressing, etc.)  - Assess/evaluate cause of self-care deficits   - Assess range of motion  - Assess patient's mobility  - Assess patient's need for assistive devices and provide as appropriate  - Encourage maximum independence but intervene and supervise when necessary  - Involve family in performance of ADLs  - Assess for home care needs following discharge   - Consider OT consult to assist with ADL evaluation and planning for discharge  - Provide patient education as appropriate  Outcome: Progressing     Problem: Prexisting or High Potential for Compromised Skin Integrity  Goal: Skin integrity is maintained or improved  Description: INTERVENTIONS:  - Identify patients at risk for skin breakdown  - Assess and monitor skin integrity  - Assess and monitor nutrition and hydration status  - Monitor labs   - Assess for incontinence   - Turn and reposition patient  - Assist with mobility/ambulation  - Relieve pressure over bony prominences  - Avoid friction and shearing  - Provide appropriate hygiene as needed including keeping skin clean and dry  - Evaluate need for skin moisturizer/barrier cream  - Collaborate with interdisciplinary team   - Patient/family teaching  - Consider wound care consult   Outcome: Progressing     Problem: Potential for Falls  Goal: Patient will remain free of falls  Description: INTERVENTIONS:  - Educate patient/family on patient safety including physical limitations  - Instruct patient to call for assistance with activity   - Consult OT/PT to assist with strengthening/mobility   - Keep Call bell within reach  - Keep bed low and locked with side rails adjusted as appropriate  - Keep care items and personal belongings within reach  - Initiate and maintain comfort rounds  - Make Fall Risk Sign visible to staff  - Offer Toileting every 2 Hours, in advance of need  - Initiate/Maintain bed/ chair alarm  - Obtain necessary fall risk management equipment: n/a  - Apply yellow socks and bracelet for high fall risk patients  - Consider moving patient to room near nurses station  Outcome: Progressing     Problem: MOBILITY - ADULT  Goal: Maintain or return to baseline ADL function  Description: INTERVENTIONS:  -  Assess patient's ability to carry out ADLs; assess patient's baseline for ADL function and identify physical deficits which impact ability to perform ADLs (bathing, care of mouth/teeth, toileting, grooming, dressing, etc.)  - Assess/evaluate cause of self-care deficits   - Assess range of motion  - Assess patient's mobility; develop plan if impaired  - Assess patient's need for assistive devices and provide as appropriate  - Encourage maximum independence but intervene and supervise when necessary  - Involve family in performance of ADLs  - Assess for home care needs following discharge   - Consider OT consult to assist with ADL evaluation and planning for discharge  - Provide patient education as appropriate  Outcome: Progressing  Goal: Maintains/Returns to pre admission functional level  Description: INTERVENTIONS:  - Perform BMAT or MOVE assessment daily.   - Set and communicate daily mobility goal to care team and patient/family/caregiver. - Collaborate with rehabilitation services on mobility goals if consulted  - Perform Range of Motion 3 times a day. - Reposition patient every 2 hours.   - Dangle patient 3 times a day  - Stand patient 3 times a day  - Ambulate patient 3 times a day  - Out of bed to chair 3 times a day   - Out of bed for meals 3 times a day  - Out of bed for toileting  - Record patient progress and toleration of activity level   Outcome: Progressing warm and dry

## 2025-01-09 ENCOUNTER — OFFICE VISIT (OUTPATIENT)
Dept: ENDOCRINOLOGY | Facility: HOSPITAL | Age: 65
End: 2025-01-09
Payer: MEDICARE

## 2025-01-09 VITALS
HEIGHT: 63 IN | WEIGHT: 262.6 LBS | DIASTOLIC BLOOD PRESSURE: 62 MMHG | HEART RATE: 100 BPM | BODY MASS INDEX: 46.53 KG/M2 | OXYGEN SATURATION: 92 % | SYSTOLIC BLOOD PRESSURE: 100 MMHG

## 2025-01-09 DIAGNOSIS — E11.42 DIABETIC POLYNEUROPATHY ASSOCIATED WITH TYPE 2 DIABETES MELLITUS (HCC): ICD-10-CM

## 2025-01-09 DIAGNOSIS — N18.2 TYPE 2 DIABETES MELLITUS WITH STAGE 2 CHRONIC KIDNEY DISEASE, WITHOUT LONG-TERM CURRENT USE OF INSULIN  (HCC): ICD-10-CM

## 2025-01-09 DIAGNOSIS — Z79.4 TYPE 2 DIABETES MELLITUS WITH STAGE 2 CHRONIC KIDNEY DISEASE, WITH LONG-TERM CURRENT USE OF INSULIN (HCC): ICD-10-CM

## 2025-01-09 DIAGNOSIS — B35.4 TINEA CORPORIS: ICD-10-CM

## 2025-01-09 DIAGNOSIS — E11.22 TYPE 2 DIABETES MELLITUS WITH STAGE 2 CHRONIC KIDNEY DISEASE, WITH LONG-TERM CURRENT USE OF INSULIN (HCC): ICD-10-CM

## 2025-01-09 DIAGNOSIS — I27.20 PULMONARY HYPERTENSION (HCC): ICD-10-CM

## 2025-01-09 DIAGNOSIS — Z79.4 TYPE 2 DIABETES MELLITUS WITH HYPERGLYCEMIA, WITH LONG-TERM CURRENT USE OF INSULIN (HCC): Primary | ICD-10-CM

## 2025-01-09 DIAGNOSIS — E78.00 HYPERCHOLESTEROLEMIA: ICD-10-CM

## 2025-01-09 DIAGNOSIS — E11.65 TYPE 2 DIABETES MELLITUS WITH HYPERGLYCEMIA, WITH LONG-TERM CURRENT USE OF INSULIN (HCC): Primary | ICD-10-CM

## 2025-01-09 DIAGNOSIS — I50.33 ACUTE ON CHRONIC DIASTOLIC HEART FAILURE (HCC): ICD-10-CM

## 2025-01-09 DIAGNOSIS — E66.01 MORBID OBESITY (HCC): ICD-10-CM

## 2025-01-09 DIAGNOSIS — E11.22 TYPE 2 DIABETES MELLITUS WITH STAGE 2 CHRONIC KIDNEY DISEASE, WITHOUT LONG-TERM CURRENT USE OF INSULIN  (HCC): ICD-10-CM

## 2025-01-09 DIAGNOSIS — N18.2 TYPE 2 DIABETES MELLITUS WITH STAGE 2 CHRONIC KIDNEY DISEASE, WITH LONG-TERM CURRENT USE OF INSULIN (HCC): ICD-10-CM

## 2025-01-09 PROBLEM — E11.319 DIABETIC RETINOPATHY ASSOCIATED WITH TYPE 2 DIABETES MELLITUS (HCC): Status: ACTIVE | Noted: 2025-01-09

## 2025-01-09 PROCEDURE — 95251 CONT GLUC MNTR ANALYSIS I&R: CPT | Performed by: INTERNAL MEDICINE

## 2025-01-09 PROCEDURE — 99214 OFFICE O/P EST MOD 30 MIN: CPT | Performed by: INTERNAL MEDICINE

## 2025-01-09 RX ORDER — INSULIN LISPRO 100 [IU]/ML
INJECTION, SOLUTION INTRAVENOUS; SUBCUTANEOUS
Qty: 60 ML | Refills: 1 | Status: SHIPPED | OUTPATIENT
Start: 2025-01-09

## 2025-01-09 RX ORDER — PEN NEEDLE, DIABETIC 32GX 5/32"
NEEDLE, DISPOSABLE MISCELLANEOUS
Qty: 400 EACH | Refills: 3 | Status: SHIPPED | OUTPATIENT
Start: 2025-01-09

## 2025-01-09 RX ORDER — ATORVASTATIN CALCIUM 40 MG/1
40 TABLET, FILM COATED ORAL DAILY
Qty: 90 TABLET | Refills: 3 | Status: SHIPPED | OUTPATIENT
Start: 2025-01-09

## 2025-01-09 RX ORDER — PREGABALIN 100 MG/1
1 CAPSULE ORAL DAILY
COMMUNITY
Start: 2025-01-07

## 2025-01-09 RX ORDER — INSULIN GLARGINE 100 [IU]/ML
INJECTION, SOLUTION SUBCUTANEOUS
Qty: 45 ML | Refills: 3 | Status: SHIPPED | OUTPATIENT
Start: 2025-01-09

## 2025-01-09 NOTE — PROGRESS NOTES
1/9/2025    Assessment & Plan      Diagnoses and all orders for this visit:    Type 2 diabetes mellitus with hyperglycemia, with long-term current use of insulin ([facility])  -     Comprehensive metabolic panel; Future  -     Hemoglobin A1C; Future  -     Albumin / creatinine urine ratio; Future  -     Comprehensive metabolic panel  -     Hemoglobin A1C  -     Albumin / creatinine urine ratio  -     Easy Comfort Pen Needles 33G X 4 MM MISC; Use 4 times a day    Diabetic polyneuropathy associated with type 2 diabetes mellitus ([facility])  -     Comprehensive metabolic panel; Future  -     Hemoglobin A1C; Future  -     Albumin / creatinine urine ratio; Future  -     Comprehensive metabolic panel  -     Hemoglobin A1C  -     Albumin / creatinine urine ratio    Type 2 diabetes mellitus with stage 2 chronic kidney disease, with long-term current use of insulin ([facility])  -     Comprehensive metabolic panel; Future  -     Hemoglobin A1C; Future  -     Albumin / creatinine urine ratio; Future  -     Comprehensive metabolic panel  -     Hemoglobin A1C  -     Albumin / creatinine urine ratio  -     Insulin Glargine Solostar (Lantus SoloStar) 100 UNIT/ML SOPN; Inject 40 units daily at bedtime  -     insulin lispro (HumaLOG KwikPen) 100 units/mL injection pen; 25 units in am, 20 units at lunch and supper    Pulmonary hypertension ([facility])    Hypercholesterolemia  -     Comprehensive metabolic panel; Future  -     Hemoglobin A1C; Future  -     Albumin / creatinine urine ratio; Future  -     Comprehensive metabolic panel  -     Hemoglobin A1C  -     Albumin / creatinine urine ratio    Acute on chronic diastolic heart failure ([facility])    Morbid obesity ([facility])    Type 2 diabetes mellitus with stage 2 chronic kidney disease, without long-term current use of insulin  ([facility])  -     Insulin Glargine Solostar (Lantus SoloStar) 100 UNIT/ML SOPN; Inject 40 units daily at bedtime    Tinea corporis    Other orders  -     pregabalin (LYRICA) 100 mg capsule; Take 1  capsule by mouth in the morning          Assessment & Plan  1. Type 2 Diabetes Mellitus, insulin requiring.  Recent hemoglobin A1c of 8.5% in September 2024 is a bit higher than it previously was demonstrating poorly controlled diabetes.  Her Dexcom readings indicate nocturnal hypoglycemia, likely due to Lantus administration, and persistent hyperglycemia during the day. A recent diabetic foot exam was conducted by Dr. Haines in August 2024. She will continue her current Farxiga 10 mg regimen. The Lantus dosage will be reduced to 40 units, while the Humalog dosage will be increased to 25 units at breakfast and 20 units at lunch and dinner. She is advised to continue using the Dexcom monitor and report any instances of hypoglycemia, including the time of occurrence, to facilitate further treatment adjustments. Blood work will be ordered for her next visit in 3 months. Prescriptions for Humalog, Lantus, and insulin pen needle tips have been provided. She will remain off Ozempic as per her request.    2. Diabetic Neuropathy.  She reports worsening numbness and tingling in her feet, along with significant pain in her thighs, especially at night. She is currently on Lyrica 100 mg daily, but it does not seem to be effective. She is advised to continue with Lyrica and monitor her symptoms. If the pain persists, further evaluation for potential pinched nerves in her back may be necessary.    3. Diabetic Nephropathy.  A urine test will be ordered to assess kidney function and determine if diabetes is affecting her kidneys. She is advised to continue her current medication regimen and monitor her symptoms.  She is currently on no ACE or ARB but is on Farxiga.    4. Hypertension.  She is normotensive in the office today.  She is currently on multiple medications for blood pressure management, including Diltiazem 120 mg daily, Metoprolol 100 mg daily, and Spironolactone 100 mg daily. She is advised to continue her current  medication regimen and monitor her blood pressure regularly.    5. Hyperlipidemia.  She is on Atorvastatin 40 mg daily for cholesterol management. She is advised to continue her current medication regimen and monitor her cholesterol levels.    Follow-up  The patient will follow up in 3 months with preceding hemoglobin A1c, CMP, and urine microalbumin to creatinine ratio.        CC: Diabetes type II, blood pressure, lipid follow-up    History of Present Illness    HPI: Mattie Joy is a 64-year-old female with a history of type 2 diabetes, insulin-requiring, hypertension, and hyperlipidemia, diagnosed with diabetes 10 years ago, who also has diabetic neuropathy and nephropathy, here for a follow-up visit.     Diabetes complications include neuropathy, retinopathy, and nephropathy. She reports no heart attack, stroke, or claudication.    Her current medication regimen includes Farxiga 10 mg daily, Humalog insulin (20 units in the morning, 15 units at lunch, and 15 units at dinner), and Lantus 43 units at bedtime. She occasionally administers an additional 3 units of Humalog if necessary. She has been experiencing hypoglycemic episodes, particularly at night. She has discontinued Ozempic for the past month due to perceived ineffectiveness. She reports frequent urination, initially every hour upon going to bed, then decreasing to every 2 to 3 hours during the night. She does not experience excessive thirst or hunger, even during hypoglycemic episodes. She has an upcoming ophthalmology appointment this month for monitoring of her diabetic retinopathy. She reports worsening numbness and tingling in her feet, accompanied by severe pain in her thighs, particularly at night. She consulted with Dr. Malik, a podiatrist, approximately 3 weeks ago and uses [employer] footwear. She had a diabetic foot exam in August 2024.     She also reports dizziness.  She is on Lyrica 100 mg daily for neuropathic pain, which she reports as  ineffective.    Supplemental Information  She is on atorvastatin 40 mg a day for cholesterol, Bumex 6 mg twice a day, diltiazem 120 mg a day for blood pressure, metoprolol 100 mg a day, and spironolactone 100 mg a day. She reports no chest pain but experiences shortness of breath. She is on 4 L/min of oxygen around the clock.      Blood Sugar/Glucometer/Pump/CGM review: She utilizes a Dexcom G6 continuous glucose monitoring system to test her blood sugars throughout the day.  Dexcom download from 12/27/2024 through 1/9/2025 was reviewed in the office today.  CGM was active 100% of the time.  Average glucose is 231 mg/dL with a standard deviation of 72 mg/dL and a GMI of 8.8%.  23% of blood sugars are in target range, 38% high, 36% very high, 1% low, and 2% very low.  Overall Dexcom download demonstrates blood sugars that drop overnight with some low blood sugars in the wee hours in the morning but blood sugars elevate post meals throughout the day and stay elevated all day.        Historical Information   Past Medical History:   Diagnosis Date    Acute blood loss anemia 06/01/2021    Acute diverticulitis 05/02/2021    Acute on chronic diastolic congestive heart failure (HCC) 05/21/2020    Acute on chronic respiratory failure with hypoxia ([facility]) 11/30/2018    Alcohol abuse 05/21/2020    Anemia     Asthma with COPD with exacerbation (HCC) 11/12/2020    Atrial fibrillation (HCC)     Cervical radiculopathy     CHF (congestive heart failure) (HCC)     Chronic low back pain     Chronic obstructive asthma (HCC) 02/20/2018    Cigarette nicotine dependence without complication 11/20/2019    Quit 12/10/2019.     Community acquired pneumonia 05/21/2020    COPD exacerbation (HCC) 09/16/2020    Depression with anxiety 03/09/2014    Diabetes mellitus with hyperglycemia (HCC)     Diverticulitis 06/06/2021    Elevated liver enzymes     GERD (gastroesophageal reflux disease)     Hypersomnolence 10/28/2020    Hypokalemia 12/04/2018     Lower gastrointestinal bleeding 2021    Paresthesia of upper extremity     Plantar fasciitis     Restless legs syndrome 2014    Severe persistent asthma with exacerbation 2018    PFTs 2018:  FEV1 0.87 L-35% predicted, 27% improvement post-bronchodilator.  DLCO-82% predicted,  The patient has been having worsening of her symptoms now for few months, especially  from 2020, also she had multiple exacerbations last year and most recently required a steroid burst and August  Reviewing her CT scan  New PFTs with severe obstruction reviewed  Hypersensitivity p    Sexual dysfunction     Sleep apnea, obstructive     Tenosynovitis of finger     Tinea corporis     Tobacco use 2018    Currently smoking 3-4 cigarettes daily    Trigger middle finger of left hand 2017    Trigger ring finger of left hand 2017    Weakness of upper extremity      Past Surgical History:   Procedure Laterality Date    ABDOMINAL SURGERY      CARPAL TUNNEL RELEASE Bilateral      SECTION      CHOLECYSTECTOMY      laparoscopic    ESOPHAGOGASTRODUODENOSCOPY N/A 12/3/2018    Procedure: ESOPHAGOGASTRODUODENOSCOPY (EGD) AND COLONOSCOPY;  Surgeon: Ludmila Perry MD;  Location: [facility];  Service: Gastroenterology    GASTRIC BYPASS      for morbid obesity with gilda en y    HERNIA REPAIR      HYSTERECTOMY      ID EXCISION GANGLION WRIST DORSAL/VOLAR PRIMARY Left 2017    Procedure: LONG FINGER GANGLION CYST EXCISION;  Surgeon: Philippe Clark MD;  Location: [facility];  Service: Orthopedics    ID TENDON SHEATH INCISION Left 2017    Procedure: THUMB, LONG, RING, TRIGGER FINGER RELEASE;  Surgeon: Philippe Clark MD;  Location: [facility];  Service: Orthopedics    SHOULDER SURGERY Right      Social History   Social History     Substance and Sexual Activity   Alcohol Use Not Currently    Alcohol/week: 20.0 standard drinks of alcohol    Types: 20 Cans of beer per week    Comment: about 6  coors light every night, stopped in 2019     Social History     Substance and Sexual Activity   Drug Use No     Social History     Tobacco Use   Smoking Status Former    Current packs/day: 0.00    Average packs/day: 0.3 packs/day for 29.9 years (7.5 ttl pk-yrs)    Types: Cigarettes    Start date:     Quit date: 12/10/2019    Years since quittin.0   Smokeless Tobacco Never     Family History:   Family History   Problem Relation Age of Onset    Alzheimer's disease Mother     Atrial fibrillation Mother     Dementia Mother     Heart disease Mother         heart problem    Seizures Mother     Parkinsonism Father     Arthritis Father     Atrial fibrillation Father     No Known Problems Daughter     Cri-du-chat syndrome Daughter     Colon cancer Maternal Grandmother 80    Diabetes type II Maternal Grandmother     No Known Problems Brother     No Known Problems Son     Substance Abuse Neg Hx         mother,father    Mental illness Neg Hx         mother,father    Colon polyps Neg Hx        Meds/Allergies   Current Outpatient Medications   Medication Sig Dispense Refill    acetaminophen (TYLENOL) 650 mg CR tablet Take 650 mg by mouth every 8 (eight) hours as needed for mild pain      albuterol (2.5 mg/3 mL) 0.083 % nebulizer solution Take 3 mL (2.5 mg total) by nebulization every 6 (six) hours as needed for wheezing or shortness of breath 1080 mL 3    albuterol (PROVENTIL HFA,VENTOLIN HFA) 90 mcg/act inhaler Inhale 2 puffs every 4 (four) hours as needed for wheezing 8.5 g 5    apixaban (Eliquis) 5 mg Take 1 tablet (5 mg total) by mouth 2 (two) times a day 180 tablet 3    atorvastatin (LIPITOR) 40 mg tablet Take 1 tablet (40 mg total) by mouth daily 90 tablet 3    Blood Glucose Monitoring Suppl (Contour Next One) AILYN USE AS DIRECTED 3 TIMES A DAY 1 each 0    budesonide (PULMICORT) 0.5 mg/2 mL nebulizer solution Take 2 mL (0.5 mg total) by nebulization 2 (two) times a day Rinse mouth after use. 60 mL 3    bumetanide  (BUMEX) 2 mg tablet Take 3 tablets (6 mg total) by mouth 2 (two) times a day 540 tablet 3    Contour Next Test test strip USE TO TEST BLOOD SUGAR 3 TIMES A  strip 3    [employer] Lancets Thin 26G MISC USE 3 (THREE) TIMES A  each 5    dapagliflozin (Farxiga) 10 MG tablet Take 1 tablet (10 mg total) by mouth daily 90 tablet 3    diltiazem (CARDIZEM CD) 120 mg 24 hr capsule Take 1 capsule (120 mg total) by mouth daily 90 capsule 3    DULoxetine (CYMBALTA) 60 mg delayed release capsule Take 1 capsule (60 mg total) by mouth daily 30 capsule 5    Easy Comfort Pen Needles 33G X 4 MM MISC Use 4 times a day 400 each 3    Ferrous Sulfate 300 MG/6.8ML SOLN Take 6.8 mL by mouth 2 (two) times a day 473 mL 0    fluticasone (FLONASE) 50 mcg/act nasal spray 1 spray into each nostril 2 (two) times a day 8 g 0    Insulin Glargine Solostar (Lantus SoloStar) 100 UNIT/ML SOPN Inject 40 units daily at bedtime 45 mL 3    insulin lispro (HumaLOG KwikPen) 100 units/mL injection pen 25 units in am, 20 units at lunch and supper 60 mL 1    Insulin Pen Needle (BD Pen Needle Love 2nd Gen) 32G X 4 MM MISC Use daily at bedtime Use 4 new needles daily . 400 each 3    levalbuterol (XOPENEX) 1.25 mg/0.5 mL nebulizer solution Take 0.5 mL (1.25 mg total) by nebulization 2 (two) times a day 60 vial 0    loratadine (CLARITIN) 10 mg tablet Take 1 tablet (10 mg total) by mouth daily as needed for allergies 30 tablet 0    metoprolol succinate (TOPROL-XL) 100 mg 24 hr tablet Take 1 tablet (100 mg total) by mouth daily 90 tablet 3    montelukast (SINGULAIR) 10 mg tablet Take 1 tablet (10 mg total) by mouth daily at bedtime 90 tablet 3    naloxone (NARCAN) 4 mg/0.1 mL nasal spray Administer 1 spray into a nostril. If breathing does not return to normal or if breathing difficulty resumes after 2-3 minutes, give another dose in the other nostril using a new spray. 1 each 1    nystatin (MYCOSTATIN) powder Apply topically 2 (two) times a day 30 g 0     "omeprazole (PriLOSEC) 40 MG capsule Take 1 capsule (40 mg total) by mouth daily 90 capsule 3    pregabalin (LYRICA) 100 mg capsule Take 1 capsule by mouth in the morning      spironolactone (ALDACTONE) 50 mg tablet Take 2 tablets (100 mg total) by mouth daily 180 tablet 1    tiotropium-olodaterol (Stiolto Respimat) 2.5-2.5 MCG/ACT inhaler Inhale 2 puffs daily 12 g 5    traZODone (DESYREL) 150 mg tablet TAKE ONE TABLET BY MOUTH AT BEDTIME 30 tablet 5    Vitamin D, Cholecalciferol, 50 MCG (2000 UT) CAPS Take 2,000 Int'l Units/day by mouth in the morning 90 capsule 3    EPINEPHrine (EPIPEN) 0.3 mg/0.3 mL SOAJ Inject 0.3 mL (0.3 mg total) into a muscle once for 1 dose 0.6 mL 0    semaglutide, 1 mg/dose, (Ozempic, 1 MG/DOSE,) 4 mg/3 mL injection pen INJECT 0.75 ML (1 MG TOTAL) UNDER THE SKIN EVERY 7 DAYS (Patient not taking: No sig reported) 3 mL 5     No current facility-administered medications for this visit.     Allergies   Allergen Reactions    Iron Dextran Swelling    Januvia [Sitagliptin] Shortness Of Breath    Jardiance [Empagliflozin] Shortness Of Breath    Clonazepam Other (See Comments)     Unknown reaction    Codeine Itching and Other (See Comments)     itch;mary kay morphine,takes ultram @home    Adhesive [Medical Tape] Itching     itching    Effexor [Venlafaxine] Itching    Lactose - Food Allergy GI Intolerance    Oxycodone-Acetaminophen Anxiety    Oxycodone-Acetaminophen Itching and Rash     Other reaction(s): Other (See Comments)  (PERCOCET) MILD RASH, not allergic to Acetaminophen        Objective   Vitals: Blood pressure 100/62, pulse 100, height 5' 3\" (1.6 m), weight 119 kg (262 lb 9.6 oz), SpO2 92%, not currently breastfeeding.  Invasive Devices       Peripheral Intravenous Line  Duration             Peripheral IV 03/05/24 Distal;Right;Ventral (anterior) Forearm 310 days                    Physical Exam    Neck: Thyroid normal in size.  No palpable thyroid nodules.  Lungs: Clear to auscultation.  Coronary: " Regular rate and rhythm.  No murmurs.  Remedies: Trace to 1+ bilateral lower extremity edema.      The history was obtained from the review of the chart and from the patient.    Lab Results:    Most recent Alc is  Lab Results   Component Value Date    HGBA1C 8.5 (H) 09/10/2024           Blood work performed on 9/10/2024 showed a CMP with a glucose of 120 random, chloride 93, CO2 30, and alkaline phosphatase 138 with a GFR of 77 but was otherwise normal.    CBC showed a hemoglobin of 10.1 with evidence of iron deficiency.    TSH is 4.31.    Lab Results   Component Value Date    CREATININE 0.80 09/10/2024    CREATININE 0.85 09/10/2024    CREATININE 0.84 09/10/2024    BUN 19 09/10/2024     09/22/2017    K 4.6 09/10/2024    CL 93 (L) 09/10/2024    CO2 30 (H) 09/10/2024     eGFR   Date Value Ref Range Status   09/10/2024 82 >59 mL/min/1.73 Final   03/10/2024 86 ml/min/1.73sq m Final     Total cholesterol 88, LDL cholesterol 30.    Lab Results   Component Value Date    CHOL 150 09/22/2017    HDL 42 09/10/2024    TRIG 75 09/10/2024    CHOLHDL 2.1 09/10/2024       Lab Results   Component Value Date    ALT 15 09/10/2024    AST 16 09/10/2024    ALKPHOS 137 (H) 03/08/2024    BILITOT 0.4 09/22/2017       Lab Results   Component Value Date    TSH 4.260 09/10/2024    FREET4 1.12 01/22/2024             Future Appointments   Date Time Provider Department Center   1/20/2025  3:00 PM Sriram Malone, PT [facility] [address]   1/21/2025  1:00 PM [facility]   2/7/2025  1:20 PM Jovanni Pacheco MD [facility]   3/7/2025 11:40 AM Laith Olivares MD [facility]   4/9/2025  1:40 PM Tin Brennan MD [facility]   4/22/2025  1:20 PM Lobito Alfredo PA-C [facility]

## 2025-01-09 NOTE — PATIENT INSTRUCTIONS
Hgba1c is 8.5%. this was higher.     Ok to stay off ozempic for now per your request.     Continue he same farxiga 10 mg daily.    Decrease lantus insulin to 40 units daily.    Increase Humalog insulin to 25 units at breakfast, 20 units at lunch and dinner.     Continue to use the dexcom. If low sugars let us know.     Follow up in 3-4 months with blood work.

## 2025-01-14 LAB
LEFT EYE DIABETIC RETINOPATHY: POSITIVE
RIGHT EYE DIABETIC RETINOPATHY: POSITIVE

## 2025-01-21 ENCOUNTER — HOSPITAL ENCOUNTER (OUTPATIENT)
Dept: INFUSION CENTER | Facility: HOSPITAL | Age: 65
Discharge: HOME | End: 2025-01-21
Attending: INTERNAL MEDICINE

## 2025-01-24 ENCOUNTER — HOSPITAL ENCOUNTER (OUTPATIENT)
Dept: INFUSION CENTER | Facility: HOSPITAL | Age: 65
End: 2025-01-24
Attending: INTERNAL MEDICINE
Payer: MEDICARE

## 2025-01-24 VITALS
DIASTOLIC BLOOD PRESSURE: 55 MMHG | TEMPERATURE: 98.1 F | SYSTOLIC BLOOD PRESSURE: 112 MMHG | HEART RATE: 106 BPM | OXYGEN SATURATION: 94 % | RESPIRATION RATE: 20 BRPM

## 2025-01-24 DIAGNOSIS — J82.83 EOSINOPHILIC ASTHMA: ICD-10-CM

## 2025-01-24 DIAGNOSIS — J44.89 ASTHMA-COPD OVERLAP SYNDROME (HCC): ICD-10-CM

## 2025-01-24 DIAGNOSIS — J44.89 ASTHMA-COPD OVERLAP SYNDROME (HCC): Primary | ICD-10-CM

## 2025-01-24 PROCEDURE — 96372 THER/PROPH/DIAG INJ SC/IM: CPT

## 2025-01-24 RX ORDER — EPINEPHRINE 0.3 MG/.3ML
0.3 INJECTION SUBCUTANEOUS ONCE
Qty: 0.6 ML | Refills: 0 | Status: SHIPPED | OUTPATIENT
Start: 2025-01-24 | End: 2025-01-24

## 2025-01-24 RX ORDER — EPINEPHRINE 1 MG/ML
0.3 INJECTION, SOLUTION, CONCENTRATE INTRAVENOUS
OUTPATIENT
Start: 2025-02-21

## 2025-01-24 RX ORDER — EPINEPHRINE 1 MG/ML
0.3 INJECTION, SOLUTION, CONCENTRATE INTRAVENOUS
Status: ACTIVE | OUTPATIENT
Start: 2025-01-24

## 2025-01-24 RX ADMIN — BENRALIZUMAB 30 MG: 30 INJECTION, SOLUTION SUBCUTANEOUS at 14:00

## 2025-01-24 NOTE — PROGRESS NOTES
..Mattie Joy  tolerated treatment well with no complications.      Mattie Joy is aware of future appt on 3/21 at 1:00.     AVS printed and given to Mattie Joy:  Yes

## 2025-01-31 ENCOUNTER — DOCUMENTATION (OUTPATIENT)
Dept: ENDOCRINOLOGY | Facility: HOSPITAL | Age: 65
End: 2025-01-31

## 2025-02-04 NOTE — ASSESSMENT & PLAN NOTE
PULMONARY INPATIENT PROGRESS NOTE    Edward Jones is a 78 year old male at [facility] ( LOS: 8 days )      Subjective:   Seen and examined  Awake, alert  Reports improvement in breathing, has productive cough w/ clear sputum  HD yesterday w/ 2L removed   HR NSR 60s  -140s       Objective:   VITALS: Patient Vitals for the past 24 hrs:   BP Temp Temp src Pulse Resp SpO2 Weight   25 1400 114/46 -- -- 69 17 99 % --   25 1356 (!) 90/77 -- -- 78 -- -- --   25 1200 (!) 146/60 98.3 °F (36.8 °C) Oral 78 20 98 % --   25 1046 105/59 -- -- 69 15 99 % --   25 1015 100/48 -- -- 68 (!) 12 99 % --   25 1000 101/43 -- -- 69 16 99 % --   25 0806 126/53 -- -- 71 -- -- --   25 0800 123/53 98.2 °F (36.8 °C) Oral 68 18 100 % --   25 0653 (!) 140/125 -- -- -- -- -- --   25 0600 133/49 -- -- 67 (!) 12 100 % --   25 0400 122/46 98.3 °F (36.8 °C) Axillary 66 (!) 11 100 % --   25 0308 -- -- -- 72 (!) 9 100 % --   25 0220 127/48 -- -- -- -- -- --   25 0000 -- 97.5 °F (36.4 °C) Axillary 66 14 100 % 78.3 kg (172 lb 9.9 oz)   25 2329 133/53 -- -- -- -- -- --   25 2324 133/53 -- -- 73 14 100 % --   25 2145 115/50 97.9 °F (36.6 °C) Oral 71 16 100 % --   25 122/56 -- -- 62 16 -- --   25 109/56 -- -- 65 16 100 % --   25 100/54 -- -- 64 18 100 % --   25 194 112/59 -- -- 69 16 -- --   25 1915 112/52 -- -- 62 14 -- --   25 1845 121/60 -- -- 67 16 -- --   25 1815 129/59 97.8 °F (36.6 °C) Oral 64 20 -- --   25 1700 122/59 -- -- 64 13 98 % --   25 1600 110/48 97.6 °F (36.4 °C) Oral 69 (!) 12 98 % --     Max Temp: Temp (24hrs), Av.9 °F (36.6 °C), Min:97.5 °F (36.4 °C), Max:98.3 °F (36.8 °C)    Last %Sat: @Lastencsp@  Last SpO2:   SpO2 Readings from Last 4 Encounters:   25 99%   25 99%   25 96%   24 96%     I&O:   Intake/Output Summary (Last   Patient admitted with Acute hypoxic respiratory failure that was due to acute diastolic CHF the from atrial flutter with rapid ventricular rate and acute COPD exacerbation  She was placed on oxygen    On discharge she is on room air hours) at 2/4/2025 1536  Last data filed at 2/3/2025 2145  Gross per 24 hour   Intake --   Output 2000 ml   Net -2000 ml       Vent Settings: [unfilled]    Physical Exam:    General Alert, cooperative, no distress   Head  Normocephalic, without obvious abnormality, atraumatic   Eyes  conjunctivae/corneas clear.    Neck supple, symmetrical, trachea midline, no adenopathy, thyroid: not enlarged, symmetric, no tenderness/mass/nodules, no carotid bruit and no JVD   Lungs   Symmetric, clear to auscultation bilaterally   Chest wall  no tenderness   Heart  regular rate and rhythm, S1, S2 normal   Abdomen   soft, non-tender. Bowel sounds normal. No masses,  No pulsatile mass.   Extremities extremities normal, atraumatic, no cyanosis or edema   Pulses 2+ and symmetric   Skin Skin color, texture, turgor normal. No rashes or lesions   Neurologic Normal,A/O X 3     Mallampati 4  Lab Review:  CBC:   Lab Results   Component Value Date    WBC 5.8 02/04/2025    RBC 2.36 (L) 02/04/2025     BMP:   Lab Results   Component Value Date    GLUCOSE 83 02/04/2025    SODIUM 136 02/04/2025    POTASSIUM 4.2 02/04/2025    CHLORIDE 102 02/04/2025    BUN 38 (H) 02/04/2025    CREATININE 4.92 (H) 02/04/2025    CALCIUM 8.7 02/04/2025     CMP:  Lab Results   Component Value Date    SODIUM 136 02/04/2025    POTASSIUM 4.2 02/04/2025    CHLORIDE 102 02/04/2025    ANIONGAP 8 02/04/2025    GLUCOSE 83 02/04/2025    BUN 38 (H) 02/04/2025    CREATININE 4.92 (H) 02/04/2025    ALBUMIN 2.8 (L) 01/31/2025    CALCIUM 8.7 02/04/2025    AST <8 01/31/2025     I personally reviewed images of the chest:  1. No pulmonary embolism to the lobar level. More distal pulmonary embolism  cannot be excluded due to respiratory motion.  2. Contrast reflux into the IVC and hepatic veins, may be seen with right  heart dysfunction.  3. Ascending thoracic aortic aneurysm measuring 4.4 cm.  4. Increased large left pleural effusion with left lower lobe collapse and  partial left upper  lobe collapse.  5. Dependent consolidation or rounded atelectasis in the posterior right  lower lobe.  6. Patchy bilateral airspace opacities, may represent atelectasis,  infection/inflammation, and/or pulmonary edema.  Echocardiogram:  1. Left ventricle: The cavity size is normal. Wall thickness is severely     increased. Hypertrophy is noted. Systolic function is normal. The ejection     fraction was measured by biplane method of disks. The ejection fraction is     60%.  2. Aortic valve: A bioprosthetic valve is present. The mean systolic gradient     is 18mm Hg.  3. Right ventricle: The cavity size is normal. Systolic function is normal.  4. Pericardium, extracardiac: There is a left pleural effusion.     Assesment/Plan:     78 year old man with H/O ESRD on HD, CHF, s/p bioprosthetic AVR 2012, AV block s/p pacemaker 2018, HTN, dyslipidemia, DAI on CPAP, DM, hypothyroidism, anemia, obesity admitted with increased dyspnea and hypoxia    # Acute hypoxic respiratory failure  -  2/2 fluid overload and left pleural effusion  # Chronic hypercapnic respiratory failure  - baseline PCO2 appears to be in the 50s; likely has underlying OHS     # Left pleural effusion  - S/P left thoracentesis 1/30; 1.5L exudative; PATHO: neg malignancy     # ESRD   - on H/D    #  s/p Bioprosthetic AVR 2012  # Hx AV block s/p pacemaker 2018    # Obesity class 1  # DAI  - on CPAP at home     PLAN:  - CXR given dyspnea- c/w bilateral pleural effusion, pulm edema  - continue O2 supplement; keep FiO2 at a minimum to maintain Spo2 >90%  - Needs home O2 eval close to discharge   - BIPAP at night and PRN; will need to change home CPAP; BIPAP deemed insufficient prevent hypercapnia leading to frequent hospitalizations and even death; will need Astral on discharge (ordered)   - Avoid over sedation, cautious use of narcotics   - Maintain negative balance  - Fluid removal/ management with HD   - Monitor BP, Carvedilol dose decreased; keep MAPs >65  - PT  as tolerated   - DVT prophylaxis with heparin subcutaneous    Discussed with patient, wife    Plan reviewed and discussed with Dr. Castaneda,     By:  Nathaly Medrano, CNP, 2/4/2025, 3:37 PM  Pulmonary, Critical Care & Sleep Medicine    Patient was seen and examined.  Discussed with the advanced practice nurse/PA.  I reviewed labs, relevant imaging studies, and other test results.  Agree with history and physical exam.  I agree with assessment and plan.   Treatment plan was reviewed and developed.  I participated in the following activities: examination, reviewing data, medical decision making.  More than 50% of the time was spent by myself in this patient's encounter.    Continue to monitor respiratory status, oxygenation, ventilation, mental status, blood pressure, renal function, urine output, electrolytes, intake and output closely.         Time spent 34 minutes this times is exclusive of any teaching or procedures time.      William Castaneda MD, [facility]  Pulmonary/Critical Care and Sleep Medicine   2/4/2025, 8:54 PM

## 2025-02-21 DIAGNOSIS — I10 ESSENTIAL HYPERTENSION: ICD-10-CM

## 2025-02-21 DIAGNOSIS — K21.9 GASTROESOPHAGEAL REFLUX DISEASE WITHOUT ESOPHAGITIS: ICD-10-CM

## 2025-02-21 RX ORDER — METOPROLOL SUCCINATE 100 MG/1
100 TABLET, EXTENDED RELEASE ORAL DAILY
Qty: 90 TABLET | Refills: 1 | Status: SHIPPED | OUTPATIENT
Start: 2025-02-21

## 2025-02-21 RX ORDER — OMEPRAZOLE 40 MG/1
40 CAPSULE, DELAYED RELEASE ORAL DAILY
Qty: 90 CAPSULE | Refills: 1 | Status: SHIPPED | OUTPATIENT
Start: 2025-02-21

## 2025-02-28 ENCOUNTER — RA CDI HCC (OUTPATIENT)
Dept: OTHER | Facility: HOSPITAL | Age: 65
End: 2025-02-28

## 2025-02-28 NOTE — PROGRESS NOTES
HCC coding opportunities          Chart Reviewed number of suggestions sent to Provider: 3   E11.65  E11.42  I13.0    Patients Insurance     Medicare Insurance: Medicare

## 2025-03-05 ENCOUNTER — TELEPHONE (OUTPATIENT)
Age: 65
End: 2025-03-05

## 2025-03-05 NOTE — TELEPHONE ENCOUNTER
Mattie called stating she was returning a call that she missed a couple minutes ago, advised her that it may have been a reminder call for her appointment as she is scheduled for 3/7. No other notes on trying to reach patient.    Please advise out to Mattie if there was something additional that she needed to be contacted for.

## 2025-03-21 ENCOUNTER — HOSPITAL ENCOUNTER (OUTPATIENT)
Dept: INFUSION CENTER | Facility: HOSPITAL | Age: 65
Discharge: HOME | End: 2025-03-21
Attending: INTERNAL MEDICINE
Payer: MEDICARE

## 2025-03-21 VITALS
OXYGEN SATURATION: 94 % | TEMPERATURE: 97.2 F | HEART RATE: 85 BPM | DIASTOLIC BLOOD PRESSURE: 73 MMHG | RESPIRATION RATE: 20 BRPM | SYSTOLIC BLOOD PRESSURE: 124 MMHG

## 2025-03-21 DIAGNOSIS — J44.89 ASTHMA-COPD OVERLAP SYNDROME (HCC): Primary | ICD-10-CM

## 2025-03-21 PROCEDURE — 96372 THER/PROPH/DIAG INJ SC/IM: CPT

## 2025-03-21 RX ORDER — EPINEPHRINE 1 MG/ML
0.3 INJECTION, SOLUTION, CONCENTRATE INTRAVENOUS
Status: DISCONTINUED | OUTPATIENT
Start: 2025-03-21 | End: 2025-03-24 | Stop reason: HOSPADM

## 2025-03-21 RX ORDER — EPINEPHRINE 1 MG/ML
0.3 INJECTION, SOLUTION, CONCENTRATE INTRAVENOUS
OUTPATIENT
Start: 2025-04-18

## 2025-03-21 RX ADMIN — BENRALIZUMAB 30 MG: 30 INJECTION, SOLUTION SUBCUTANEOUS at 13:08

## 2025-03-21 NOTE — PLAN OF CARE
Problem: Potential for Falls  Goal: Patient will remain free of falls  Description: INTERVENTIONS:- Educate patient/family on patient safety including physical limitations- Instruct patient to call for assistance with activity - Consult OT/PT to assist with strengthening/mobility - Keep Call bell within reach- Keep bed low and locked with side rails adjusted as appropriate-   Outcome: Progressing     Problem: Knowledge Deficit  Goal: Patient/family/caregiver demonstrates understanding of disease process, treatment plan, medications, and discharge instructions  Description: Complete learning assessment and assess knowledge base.Interventions:- Provide teaching at level of understanding- Provide teaching via preferred learning methods  Outcome: Progressing

## 2025-03-21 NOTE — PROGRESS NOTES
Mattie Joy  tolerated treatment well with no complications.    Epi pen expiration date: 6/30/2026      Mattie Joy is aware of future appt on 5/16 at 1200.     AVS printed and given to Mattie Joy:  Yes

## 2025-03-24 DIAGNOSIS — J45.51 SEVERE PERSISTENT ASTHMA WITH ACUTE EXACERBATION: ICD-10-CM

## 2025-03-25 RX ORDER — MONTELUKAST SODIUM 10 MG/1
10 TABLET ORAL
Qty: 90 TABLET | Refills: 1 | Status: SHIPPED | OUTPATIENT
Start: 2025-03-25

## 2025-04-09 ENCOUNTER — OFFICE VISIT (OUTPATIENT)
Age: 65
End: 2025-04-09
Payer: MEDICARE

## 2025-04-09 ENCOUNTER — TELEPHONE (OUTPATIENT)
Age: 65
End: 2025-04-09

## 2025-04-09 ENCOUNTER — NURSE TRIAGE (OUTPATIENT)
Age: 65
End: 2025-04-09

## 2025-04-09 VITALS
DIASTOLIC BLOOD PRESSURE: 70 MMHG | SYSTOLIC BLOOD PRESSURE: 122 MMHG | TEMPERATURE: 98.3 F | HEART RATE: 87 BPM | OXYGEN SATURATION: 96 % | BODY MASS INDEX: 46.52 KG/M2 | HEIGHT: 63 IN

## 2025-04-09 DIAGNOSIS — G47.33 OBSTRUCTIVE SLEEP APNEA: ICD-10-CM

## 2025-04-09 DIAGNOSIS — J96.12 CHRONIC RESPIRATORY FAILURE WITH HYPOXIA AND HYPERCAPNIA (HCC): Primary | ICD-10-CM

## 2025-04-09 DIAGNOSIS — J45.50 SEVERE PERSISTENT ASTHMA, UNSPECIFIED WHETHER COMPLICATED: ICD-10-CM

## 2025-04-09 DIAGNOSIS — E66.01 MORBID OBESITY (HCC): ICD-10-CM

## 2025-04-09 DIAGNOSIS — J96.11 CHRONIC RESPIRATORY FAILURE WITH HYPOXIA AND HYPERCAPNIA (HCC): Primary | ICD-10-CM

## 2025-04-09 DIAGNOSIS — J44.89 ASTHMA-COPD OVERLAP SYNDROME (HCC): ICD-10-CM

## 2025-04-09 PROCEDURE — 99214 OFFICE O/P EST MOD 30 MIN: CPT | Performed by: INTERNAL MEDICINE

## 2025-04-09 PROCEDURE — G2211 COMPLEX E/M VISIT ADD ON: HCPCS | Performed by: INTERNAL MEDICINE

## 2025-04-09 RX ORDER — ARFORMOTEROL TARTRATE 15 UG/2ML
15 SOLUTION RESPIRATORY (INHALATION) 2 TIMES DAILY
Qty: 60 ML | Refills: 3 | Status: SHIPPED | OUTPATIENT
Start: 2025-04-09

## 2025-04-09 RX ORDER — REVEFENACIN 175 UG/3ML
175 SOLUTION RESPIRATORY (INHALATION) DAILY
Qty: 90 ML | Refills: 3 | Status: SHIPPED | OUTPATIENT
Start: 2025-04-09

## 2025-04-09 RX ORDER — BUDESONIDE 1 MG/2ML
1 INHALANT ORAL DAILY
Qty: 60 ML | Refills: 3 | Status: SHIPPED | OUTPATIENT
Start: 2025-04-09

## 2025-04-09 NOTE — ASSESSMENT & PLAN NOTE
Patient has Chronic Respiratory failure due to COPD. Pt has evidence for CO2 retention and requires noninvasive ventilation with volume targeted mode at home for optimal management. BiPAP was tried and patient remains hypercapnic due to the inability to provide noninvasive ventilation of adequate degree. Continuous alarm systems and backup are required for optimal management due to power outage. This patient is at high risk for respiratory failure without noninvasive ventilation at home. This could prevent repeated hospital admissions and improve the quality of life for the patient.    Has been switched to iVAPS because of the recurrent exacerbations and hypoventilation evidence

## 2025-04-09 NOTE — TELEPHONE ENCOUNTER
"FOLLOW UP: N/A    REASON FOR CONVERSATION: Advice Only    SYMPTOMS: Patient called wanting to make an appointment for suspected bladder infection. No appt times worked for patient's schedule today. Advised urgent care and provided closest location. Patient verbalized understanding.     OTHER: N/A    DISPOSITION: Go To Urgent Care/Come To The Office (With HCP Approval) (overriding See Within 2 Weeks in Office)        Reason for Disposition   Requesting regular office appointment and adult stable (no new symptoms, not getting worse)    Answer Assessment - Initial Assessment Questions  1. REASON FOR CALL: \"What is the main reason for your call?\" or \"How can I best help you?\"      Patient called with suspected bladder infection  2. SYMPTOMS : \"Do you have any symptoms?\"       \"Symptoms kept me up all night\"    Protocols used: Information Only Call - No Triage-Adult-[place]    "

## 2025-04-09 NOTE — ASSESSMENT & PLAN NOTE
Patient has Chronic Respiratory failure due to COPD. Pt has evidence for CO2 retention and requires noninvasive ventilation with volume targeted mode at home for optimal management. BiPAP was tried and patient remains hypercapnic due to the inability to provide noninvasive ventilation of adequate degree. Continuous alarm systems and backup are required for optimal management due to power outage. This patient is at high risk for respiratory failure without noninvasive ventilation at home. This could prevent repeated hospital admissions and improve the quality of life for the patient.

## 2025-04-09 NOTE — PROGRESS NOTES
Follow-up  Visit - Pulmonary Medicine   Name: Mattie Joy      : 1960      MRN: [id number]  Encounter Provider: Tin Brennan MD  Encounter Date: 2025   Encounter department: [facility]  :  Assessment & Plan  Chronic respiratory failure with hypoxia and hypercapnia (HCC)  Patient has Chronic Respiratory failure due to COPD. Pt has evidence for CO2 retention and requires noninvasive ventilation with volume targeted mode at home for optimal management. BiPAP was tried and patient remains hypercapnic due to the inability to provide noninvasive ventilation of adequate degree. Continuous alarm systems and backup are required for optimal management due to power outage. This patient is at high risk for respiratory failure without noninvasive ventilation at home. This could prevent repeated hospital admissions and improve the quality of life for the patient.      Obstructive sleep apnea  Patient has Chronic Respiratory failure due to COPD. Pt has evidence for CO2 retention and requires noninvasive ventilation with volume targeted mode at home for optimal management. BiPAP was tried and patient remains hypercapnic due to the inability to provide noninvasive ventilation of adequate degree. Continuous alarm systems and backup are required for optimal management due to power outage. This patient is at high risk for respiratory failure without noninvasive ventilation at home. This could prevent repeated hospital admissions and improve the quality of life for the patient.    Has been switched to iVAPS because of the recurrent exacerbations and hypoventilation evidence        Morbid obesity (HCC)    BMI 46.52   Noted, she would benefit from weight loss        Asthma-COPD overlap syndrome (HCC)  She is very symptomatic  With this could be a component of cardiac dyspnea, however she is on diuretics and follows up with cardiology  I am having thinking that she is having difficulty  inhaling her inhalers currently so I will switch her to hold nebulizer regimen Pulmicort, Brovana and Yupelri   Orders:    budesonide (Pulmicort) 1 MG/2ML nebulizer solution; Take 2 mL (1 mg total) by nebulization daily Rinse mouth after use.    arformoterol (BROVANA) 15 mcg/2 mL nebulizer solution; Take 2 mL (15 mcg total) by nebulization 2 (two) times a day    Revefenacin (Yupelri) 175 MCG/3ML SOLN; Take 3 mL (175 mcg total) by nebulization in the morning    Nebulizer Supplies    Severe persistent asthma, unspecified whether complicated  As above  But also she is on Fasenra injections she tells me that it was helping it initially but after a while it has not been so helpful to her, at this point I would like to start with a switching her inhalation regimen to home nebulizer regimen and bring her back for follow-up in 4 weeks if that does not help then we can start talking about switching her to Tezspire         Return in about 1 month (around 5/9/2025).    History of Present Illness   Mattie Joy is a 64 y.o. female who presents for follow-up appointment last seen by Suyapa in October 2024 for she has chronic asthma/COPD, morbid obesity WILMA and obesity hypoventilation has been on noninvasive ventilator for many years now with improvement of her sleep quality we were able to obtain the compliance from her astral today    She was started on Fasenra with benefiting from it however she has been struggling recently with dyspnea with walking small distances despite being on the same regimen she is currently on Symbicort and Bevespi, and is also using albuterol as needed      Aside from what is mentioned in the HPI, ROS is otherwise negative    Medical History Reviewed by provider this encounter:  Tobacco  Allergies  Meds  Problems  Med Hx  Surg Hx  Fam Hx     .  Past Medical History   Past Medical History:   Diagnosis Date    Acute blood loss anemia 06/01/2021    Acute diverticulitis 05/02/2021    Acute on  chronic diastolic congestive heart failure (HCC) 2020    Acute on chronic respiratory failure with hypoxia (HCC) 2018    Alcohol abuse 2020    Anemia     Asthma with COPD with exacerbation (HCC) 2020    Atrial fibrillation (HCC)     Cervical radiculopathy     CHF (congestive heart failure) (HCC)     Chronic low back pain     Chronic obstructive asthma (HCC) 2018    Cigarette nicotine dependence without complication 2019    Quit 12/10/2019.     Community acquired pneumonia 2020    COPD exacerbation (HCC) 2020    Depression with anxiety 2014    Diabetes mellitus with hyperglycemia ([facility])     Diverticulitis 2021    Elevated liver enzymes     GERD (gastroesophageal reflux disease)     Hypersomnolence 10/28/2020    Hypokalemia 2018    Lower gastrointestinal bleeding 2021    Paresthesia of upper extremity     Plantar fasciitis     Restless legs syndrome 2014    Severe persistent asthma with exacerbation 2018    PFTs 2018:  FEV1 0.87 L-35% predicted, 27% improvement post-bronchodilator.  DLCO-82% predicted,  The patient has been having worsening of her symptoms now for few months, especially  from 2020, also she had multiple exacerbations last year and most recently required a steroid burst and August  Reviewing her CT scan  New PFTs with severe obstruction reviewed  Hypersensitivity p    Sexual dysfunction     Sleep apnea, obstructive     Tenosynovitis of finger     Tinea corporis     Tobacco use 2018    Currently smoking 3-4 cigarettes daily    Trigger middle finger of left hand 2017    Trigger ring finger of left hand 2017    Weakness of upper extremity      Past Surgical History:   Procedure Laterality Date    ABDOMINAL SURGERY      CARPAL TUNNEL RELEASE Bilateral      SECTION      CHOLECYSTECTOMY      laparoscopic    ESOPHAGOGASTRODUODENOSCOPY N/A 12/3/2018    Procedure:  ESOPHAGOGASTRODUODENOSCOPY (EGD) AND COLONOSCOPY;  Surgeon: Ludmila Perry MD;  Location: [facility];  Service: Gastroenterology    GASTRIC BYPASS      for morbid obesity with gilda en y    HERNIA REPAIR      HYSTERECTOMY      OH EXCISION GANGLION WRIST DORSAL/VOLAR PRIMARY Left 12/14/2017    Procedure: LONG FINGER GANGLION CYST EXCISION;  Surgeon: Philippe Clark MD;  Location: [facility];  Service: Orthopedics    OH TENDON SHEATH INCISION Left 12/14/2017    Procedure: THUMB, LONG, RING, TRIGGER FINGER RELEASE;  Surgeon: Philippe Clark MD;  Location: [facility];  Service: Orthopedics    SHOULDER SURGERY Right      Family History   Problem Relation Age of Onset    Alzheimer's disease Mother     Atrial fibrillation Mother     Dementia Mother     Heart disease Mother         heart problem    Seizures Mother     Parkinsonism Father     Arthritis Father     Atrial fibrillation Father     No Known Problems Daughter     Cri-du-chat syndrome Daughter     Colon cancer Maternal Grandmother 80    Diabetes type II Maternal Grandmother     No Known Problems Brother     No Known Problems Son     Substance Abuse Neg Hx         mother,father    Mental illness Neg Hx         mother,father    Colon polyps Neg Hx       reports that she quit smoking about 5 years ago. Her smoking use included cigarettes. She started smoking about 35 years ago. She has a 7.5 pack-year smoking history. She has never used smokeless tobacco. She reports that she does not currently use alcohol after a past usage of about 20.0 standard drinks of alcohol per week. She reports that she does not use drugs.  Current Outpatient Medications   Medication Instructions    acetaminophen (TYLENOL) 650 mg, Every 8 hours PRN    albuterol (PROVENTIL HFA,VENTOLIN HFA) 90 mcg/act inhaler 2 puffs, Inhalation, Every 4 hours PRN    albuterol 2.5 mg, Nebulization, Every 6 hours PRN    apixaban (ELIQUIS) 5 mg, Oral, 2 times daily    arformoterol (BROVANA) 15 mcg,  Nebulization, 2 times daily    atorvastatin (LIPITOR) 40 mg, Oral, Daily    Blood Glucose Monitoring Suppl (Contour Next One) AILYN USE AS DIRECTED 3 TIMES A DAY    budesonide (PULMICORT) 1 mg, Nebulization, Daily, Rinse mouth after use.    bumetanide (BUMEX) 6 mg, Oral, 2 times daily    Contour Next Test test strip USE TO TEST BLOOD SUGAR 3 TIMES A DAY    [employer] Lancets Thin 26G MISC USE 3 (THREE) TIMES A DAY    dapagliflozin (FARXIGA) 10 mg, Oral, Daily    diltiazem (CARDIZEM CD) 120 mg, Oral, Daily    DULoxetine (CYMBALTA) 60 mg, Oral, Daily    Easy Comfort Pen Needles 33G X 4 MM MISC Use 4 times a day    EPINEPHrine (EPIPEN) 0.3 mg, Intramuscular, Once    Ferrous Sulfate 300 MG/6.8ML SOLN 6.8 mL, Oral, 2 times daily    fluticasone (FLONASE) 50 mcg/act nasal spray 1 spray, Nasal, 2 times daily    Insulin Glargine Solostar (Lantus SoloStar) 100 UNIT/ML SOPN Inject 40 units daily at bedtime    insulin lispro (HumaLOG KwikPen) 100 units/mL injection pen 25 units in am, 20 units at lunch and supper    Insulin Pen Needle (BD Pen Needle Love 2nd Gen) 32G X 4 MM MISC Does not apply, Daily at bedtime, Use 4 new needles daily .    levalbuterol (XOPENEX) 1.25 mg, Nebulization, 2 times daily (RESP)    loratadine (CLARITIN) 10 mg, Oral, Daily PRN    metoprolol succinate (TOPROL-XL) 100 mg, Oral, Daily    montelukast (SINGULAIR) 10 mg, Oral, Daily at bedtime    naloxone (NARCAN) 4 mg/0.1 mL nasal spray Administer 1 spray into a nostril. If breathing does not return to normal or if breathing difficulty resumes after 2-3 minutes, give another dose in the other nostril using a new spray.    nystatin (MYCOSTATIN) powder Topical, 2 times daily    omeprazole (PRILOSEC) 40 mg, Oral, Daily    pregabalin (LYRICA) 100 mg capsule 1 capsule, Oral, Daily    semaglutide, 1 mg/dose, (Ozempic, 1 MG/DOSE,) 4 mg/3 mL injection pen INJECT 0.75 ML (1 MG TOTAL) UNDER THE SKIN EVERY 7 DAYS    spironolactone (ALDACTONE) 100 mg, Oral, Daily    traZODone  (DESYREL) 150 mg, Oral, Daily at bedtime    Vitamin D (Cholecalciferol) 2,000 Int'l Units/day, Oral, Daily    Yupelri 175 mcg, Nebulization, Daily     Allergies   Allergen Reactions    Iron Dextran Swelling    Januvia [Sitagliptin] Shortness Of Breath    Jardiance [Empagliflozin] Shortness Of Breath    Clonazepam Other (See Comments)     Unknown reaction    Codeine Itching and Other (See Comments)     itch;mary kay morphine,takes ultram @home    Adhesive [Medical Tape] Itching     itching    Effexor [Venlafaxine] Itching    Lactose - Food Allergy GI Intolerance    Oxycodone-Acetaminophen Anxiety    Oxycodone-Acetaminophen Itching and Rash     Other reaction(s): Other (See Comments)  (PERCOCET) MILD RASH, not allergic to Acetaminophen       Current Outpatient Medications on File Prior to Visit   Medication Sig Dispense Refill    acetaminophen (TYLENOL) 650 mg CR tablet Take 650 mg by mouth every 8 (eight) hours as needed for mild pain      albuterol (PROVENTIL HFA,VENTOLIN HFA) 90 mcg/act inhaler Inhale 2 puffs every 4 (four) hours as needed for wheezing 8.5 g 5    apixaban (Eliquis) 5 mg Take 1 tablet (5 mg total) by mouth 2 (two) times a day 180 tablet 3    atorvastatin (LIPITOR) 40 mg tablet Take 1 tablet (40 mg total) by mouth daily 90 tablet 3    Blood Glucose Monitoring Suppl (Contour Next One) AILYN USE AS DIRECTED 3 TIMES A DAY 1 each 0    bumetanide (BUMEX) 2 mg tablet Take 3 tablets (6 mg total) by mouth 2 (two) times a day 540 tablet 3    dapagliflozin (Farxiga) 10 MG tablet Take 1 tablet (10 mg total) by mouth daily 90 tablet 3    diltiazem (CARDIZEM CD) 120 mg 24 hr capsule Take 1 capsule (120 mg total) by mouth daily 90 capsule 3    DULoxetine (CYMBALTA) 60 mg delayed release capsule Take 1 capsule (60 mg total) by mouth daily 30 capsule 5    Easy Comfort Pen Needles 33G X 4 MM MISC Use 4 times a day 400 each 3    EPINEPHrine (EPIPEN) 0.3 mg/0.3 mL SOAJ Inject 0.3 mL (0.3 mg total) into a muscle once for 1  dose 0.6 mL 0    Insulin Glargine Solostar (Lantus SoloStar) 100 UNIT/ML SOPN Inject 40 units daily at bedtime 45 mL 3    insulin lispro (HumaLOG KwikPen) 100 units/mL injection pen 25 units in am, 20 units at lunch and supper 60 mL 1    Insulin Pen Needle (BD Pen Needle Love 2nd Gen) 32G X 4 MM MISC Use daily at bedtime Use 4 new needles daily . 400 each 3    loratadine (CLARITIN) 10 mg tablet Take 1 tablet (10 mg total) by mouth daily as needed for allergies 30 tablet 0    metoprolol succinate (TOPROL-XL) 100 mg 24 hr tablet Take 1 tablet (100 mg total) by mouth daily 90 tablet 1    montelukast (SINGULAIR) 10 mg tablet TAKE ONE TABLET BY MOUTH AT BEDTIME 90 tablet 1    naloxone (NARCAN) 4 mg/0.1 mL nasal spray Administer 1 spray into a nostril. If breathing does not return to normal or if breathing difficulty resumes after 2-3 minutes, give another dose in the other nostril using a new spray. 1 each 1    omeprazole (PriLOSEC) 40 MG capsule Take 1 capsule (40 mg total) by mouth daily 90 capsule 1    spironolactone (ALDACTONE) 50 mg tablet Take 2 tablets (100 mg total) by mouth daily 180 tablet 1    traZODone (DESYREL) 150 mg tablet TAKE ONE TABLET BY MOUTH AT BEDTIME 30 tablet 5    Vitamin D, Cholecalciferol, 50 MCG (2000 UT) CAPS Take 2,000 Int'l Units/day by mouth in the morning 90 capsule 3    [DISCONTINUED] tiotropium-olodaterol (Stiolto Respimat) 2.5-2.5 MCG/ACT inhaler Inhale 2 puffs daily 12 g 5    albuterol (2.5 mg/3 mL) 0.083 % nebulizer solution Take 3 mL (2.5 mg total) by nebulization every 6 (six) hours as needed for wheezing or shortness of breath (Patient not taking: Reported on 4/9/2025) 1080 mL 3    Contour Next Test test strip USE TO TEST BLOOD SUGAR 3 TIMES A DAY (Patient not taking: Reported on 4/9/2025) 100 strip 3    [employer] Lancets Thin 26G MISC USE 3 (THREE) TIMES A DAY (Patient not taking: Reported on 4/9/2025) 100 each 5    Ferrous Sulfate 300 MG/6.8ML SOLN Take 6.8 mL by mouth 2 (two) times a  "day (Patient not taking: Reported on 2025) 473 mL 0    fluticasone (FLONASE) 50 mcg/act nasal spray 1 spray into each nostril 2 (two) times a day (Patient not taking: Reported on 2025) 8 g 0    levalbuterol (XOPENEX) 1.25 mg/0.5 mL nebulizer solution Take 0.5 mL (1.25 mg total) by nebulization 2 (two) times a day (Patient not taking: Reported on 2025) 60 vial 0    nystatin (MYCOSTATIN) powder Apply topically 2 (two) times a day (Patient not taking: Reported on 2025) 30 g 0    pregabalin (LYRICA) 100 mg capsule Take 1 capsule by mouth in the morning      semaglutide, 1 mg/dose, (Ozempic, 1 MG/DOSE,) 4 mg/3 mL injection pen INJECT 0.75 ML (1 MG TOTAL) UNDER THE SKIN EVERY 7 DAYS (Patient not taking: No sig reported) 3 mL 5    [DISCONTINUED] budesonide (PULMICORT) 0.5 mg/2 mL nebulizer solution Take 2 mL (0.5 mg total) by nebulization 2 (two) times a day Rinse mouth after use. (Patient not taking: Reported on 2025) 60 mL 3     No current facility-administered medications on file prior to visit.      Social History     Tobacco Use    Smoking status: Former     Current packs/day: 0.00     Average packs/day: 0.3 packs/day for 29.9 years (7.5 ttl pk-yrs)     Types: Cigarettes     Start date:      Quit date: 12/10/2019     Years since quittin.3    Smokeless tobacco: Never   Vaping Use    Vaping status: Never Used   Substance and Sexual Activity    Alcohol use: Not Currently     Alcohol/week: 20.0 standard drinks of alcohol     Types: 20 Cans of beer per week     Comment: about 6 coors light every night, stopped in 2019    Drug use: No    Sexual activity: Not Currently        Medical History Reviewed by provider this encounter:  Tobacco  Allergies  Meds  Problems  Med Hx  Surg Hx  Fam Hx     .    Objective   /70 (BP Location: Right arm, Patient Position: Sitting, Cuff Size: Standard)   Pulse 87   Temp 98.3 °F (36.8 °C) (Tympanic)   Ht 5' 3\" (1.6 m) Comment: historical  SpO2 96%   " BMI 46.52 kg/m²     Physical Exam  Constitutional:       Appearance: Normal appearance. She is not ill-appearing or diaphoretic.   HENT:      Head: Normocephalic and atraumatic.      Nose: No congestion or rhinorrhea.      Mouth/Throat:      Mouth: Mucous membranes are moist.      Pharynx: Oropharynx is clear.   Eyes:      General: No scleral icterus.        Right eye: No discharge.         Left eye: No discharge.      Extraocular Movements: Extraocular movements intact.   Cardiovascular:      Rate and Rhythm: Normal rate and regular rhythm.      Pulses: Normal pulses.      Heart sounds: Normal heart sounds. No murmur heard.     No gallop.   Pulmonary:      Effort: Pulmonary effort is normal. No respiratory distress.      Breath sounds: No wheezing, rhonchi or rales.      Comments: Bilateral diminished air entry  Musculoskeletal:         General: No swelling, tenderness or deformity.      Cervical back: No rigidity.   Skin:     General: Skin is warm and dry.   Neurological:      General: No focal deficit present.      Mental Status: She is alert and oriented to person, place, and time. Mental status is at baseline.   Psychiatric:         Mood and Affect: Mood normal.         Behavior: Behavior normal.         Thought Content: Thought content normal.         Judgment: Judgment normal.           Diagnostic Data:  Labs: I personally reviewed the most recent laboratory data pertinent to today's visit.      Radiology results:  Radiology Results Review: I have reviewed radiology reports from 3/2024 including: chest xray.  IMPRESSION:     Mild pulmonary venous congestion, similar to previous study.    PFT/spirometry results:     Results:  FEV1/FVC Ratio: 57 % (72% predicted)  Forced Vital Capacity:  1.08L    36 % predicted  FEV1: 0.62 L26 % predicted  After administration of bronchodilator FEV1: 0.84 (+36%)     Lung volumes by body plethysmography: Total Lung Capacity 102 % predicted Residual volume 175 % predicted  RV/TLC  ratio 171%     DLCO corrected for patients hemoglobin level: 54 %     6MWT:  Walked total of 2 mins; distance 109.6m           Other studies:  Compliance to astral  3/10/2025 through 4/8/2025, 97% usage, average 13 hours 30 minutes Auto CPAP range 10-15, pressure support 12-14, alveolar ventilation 6.6 L/min  Residual AHI of 0.7 events per hour with no significant mask  TI min 0.5 seconds, TI max of 1.5 seconds, cycle of 25%      Administrative Statements   I have spent a total time of 34 minutes in caring for this patient on the day of the visit/encounter including Impressions, Counseling / Coordination of care, Documenting in the medical record, and Reviewing/placing orders in the medical record (including tests, medications, and/or procedures).    Tin Brennan MD

## 2025-04-09 NOTE — ASSESSMENT & PLAN NOTE
As above  But also she is on Fasenra injections she tells me that it was helping it initially but after a while it has not been so helpful to her, at this point I would like to start with a switching her inhalation regimen to home nebulizer regimen and bring her back for follow-up in 4 weeks if that does not help then we can start talking about switching her to Tezspire

## 2025-04-09 NOTE — TELEPHONE ENCOUNTER
Faxed Astral Non-Invasive Ventilator (Mechanical Ventilator Equipment Order) with Recent OV Note to [employer] [facility] at number on form ([phone]). Scanned copy of signed form into Pt's chart.

## 2025-04-09 NOTE — TELEPHONE ENCOUNTER
Called pt for 1m f/u in [place].   Dr felton told me to make f/u with merrill brambila , he gave her instructions and is good with merrill doing f/u.  Pt only wants [place] and wilfredo is out of appts.

## 2025-04-09 NOTE — ASSESSMENT & PLAN NOTE
She is very symptomatic  With this could be a component of cardiac dyspnea, however she is on diuretics and follows up with cardiology  I am having thinking that she is having difficulty inhaling her inhalers currently so I will switch her to hold nebulizer regimen Pulmicort, Brovana and Yupelri   Orders:    budesonide (Pulmicort) 1 MG/2ML nebulizer solution; Take 2 mL (1 mg total) by nebulization daily Rinse mouth after use.    arformoterol (BROVANA) 15 mcg/2 mL nebulizer solution; Take 2 mL (15 mcg total) by nebulization 2 (two) times a day    Revefenacin (Yupelri) 175 MCG/3ML SOLN; Take 3 mL (175 mcg total) by nebulization in the morning    Nebulizer Supplies

## 2025-04-10 NOTE — TELEPHONE ENCOUNTER
Pt returned call and made 1 mo f/u appt with ROBERTO Wheeler     Pt also letting Dr. Brennan know that she got her Budesonide and Arformoterol medication. BUT  Revefenacin (Yupelri) she opted out of getting / stating insurance issue / and medication cost would be $ 1,000.00

## 2025-04-22 ENCOUNTER — PATIENT OUTREACH (OUTPATIENT)
Dept: CASE MANAGEMENT | Facility: OTHER | Age: 65
End: 2025-04-22

## 2025-04-22 ENCOUNTER — OFFICE VISIT (OUTPATIENT)
Dept: ENDOCRINOLOGY | Facility: HOSPITAL | Age: 65
End: 2025-04-22
Payer: MEDICARE

## 2025-04-22 VITALS
HEIGHT: 63 IN | WEIGHT: 271 LBS | HEART RATE: 86 BPM | DIASTOLIC BLOOD PRESSURE: 62 MMHG | SYSTOLIC BLOOD PRESSURE: 100 MMHG | BODY MASS INDEX: 48.02 KG/M2

## 2025-04-22 DIAGNOSIS — L98.9 SKIN LESION OF HAND: ICD-10-CM

## 2025-04-22 DIAGNOSIS — Z79.4 TYPE 2 DIABETES MELLITUS WITH HYPERGLYCEMIA, WITH LONG-TERM CURRENT USE OF INSULIN (HCC): Primary | ICD-10-CM

## 2025-04-22 DIAGNOSIS — E78.00 HYPERCHOLESTEROLEMIA: ICD-10-CM

## 2025-04-22 DIAGNOSIS — E11.65 TYPE 2 DIABETES MELLITUS WITH HYPERGLYCEMIA, WITH LONG-TERM CURRENT USE OF INSULIN (HCC): Primary | ICD-10-CM

## 2025-04-22 LAB — SL AMB POCT HEMOGLOBIN AIC: 8.8 (ref ?–6.5)

## 2025-04-22 PROCEDURE — 83036 HEMOGLOBIN GLYCOSYLATED A1C: CPT | Performed by: PHYSICIAN ASSISTANT

## 2025-04-22 PROCEDURE — 99214 OFFICE O/P EST MOD 30 MIN: CPT | Performed by: PHYSICIAN ASSISTANT

## 2025-04-22 PROCEDURE — G2211 COMPLEX E/M VISIT ADD ON: HCPCS | Performed by: PHYSICIAN ASSISTANT

## 2025-04-22 RX ORDER — TIRZEPATIDE 7.5 MG/.5ML
7.5 INJECTION, SOLUTION SUBCUTANEOUS WEEKLY
Qty: 6 ML | Refills: 1 | Status: SHIPPED | OUTPATIENT
Start: 2025-04-22

## 2025-04-22 RX ORDER — PREGABALIN 150 MG/1
1 CAPSULE ORAL DAILY
COMMUNITY
Start: 2025-04-09

## 2025-04-22 NOTE — PROGRESS NOTES
Name: Mattie Joy      : 1960      MRN: [id number]  Encounter Provider: Lobito Alfredo PA-C  Encounter Date: 2025   Encounter department: [facility]    No chief complaint on file.  :  Assessment & Plan  Type 2 diabetes mellitus with hyperglycemia, with long-term current use of insulin (HCC)  A1c has increased since last office visit, this could be due to discontinuing a GLP-1.  At this time she is interested in restarting a GLP-1.  Was previously on Ozempic, but insurance is no longer covering that so we will switch her to Mounjaro 5 mg daily.  She will continue with Lantus 40 units daily and Humalog 25 units with breakfast and 20 units with lunch and dinner.  Did discuss side effects of medication once again.  Continue utilizing Dexcom to monitor glucose levels.  If there is any issues, please contact the office so we can make adjustments to her insulin.  Lab Results   Component Value Date    HGBA1C 8.8 (A) 2025       Orders:  •  Hemoglobin A1C; Future  •  Comprehensive metabolic panel; Future  •  Albumin / creatinine urine ratio; Future  •  Lipid panel; Future  •  Tirzepatide (Mounjaro) 7.5 MG/0.5ML SOAJ; Inject 7.5 mg under the skin once a week  •  Ambulatory Referral to Social Work Care Management Program; Future  •  POCT hemoglobin A1c    Skin lesion of hand  Has concerns with a growth on her right hand.  Did inform her that I will refer her to dermatology for further evaluation and treatment if needed.  Orders:  •  Ambulatory Referral to Dermatology; Future    Hypercholesterolemia  Previous lipid panel was excellent.  She will continue with atorvastatin 40 mg daily.       Put in order for  per patient's request.    History of Present Illness     Mattie Joy is a 64 y.o. female with type 2 diabetes, insulin requiring for 8 year, hyperlipidemia for follow-up.   She was placed on insulin in 2021 when her hemoglobin  A1c went up markedly.  She reportedly did try Januvia and Jardiance in the past but had problems with side effects on these medications. She is on oral agents and insulin at home and takes Lantus insulin 42 units at bedtime, Humalog 15 units with meals, and Farxiga 10 mg daily.  Her Ozempic was discontinued after last office visit.  Insurance was no longer covering that medication and she did not necessarily want to switch at that time and went to see how well she can do without the medication.  Previously on metformin.  She admits to polyuria, polydipsia, polyphagia, nocturia 3-4 times a night, and blurry vision.  She has unusual sensations of her feet with painful sensations of her feet.  She denies chest pain but has shortness of breath with her lung disease.  She is on oxygen 24 hours a day. She denies retinopathy, heart attack, stroke and claudication but does admit to neuropathy and nephropathy.  Is doing well today.  No hospitalizations since last office visit.  Overall states that she is doing well.  He has no concerns or questions today, but does admit that she needs to work on dietary habits to help improve glucose levels.  Limited physical activity capability due to her other chronic medical conditions.     Has followed up with Diabetes Education November 2021.     Hypoglycemic episodes: No never.  H/o of hypoglycemia causing hospitalization or intervention such as glucagon injection  or ambulance call  No.  Hypoglycemia symptoms: never had one.  Treatment of hypoglycemia: would eat something sweet or sugar.  Glucagon:No.  Medic alert tag: recommended,Yes.      The patient's last eye exam was at May 2024.  The patient's last foot exam was in with podiatry, Dr. Blake Malik.  She last saw Podiatry several weeks ago and does every 3 months.  Last diabetic foot exam completed in the office was August 2024 at PCPs office.     Blood Sugar/Glucometer/Pump/CGM review: Unfortunately she did not bring her Dexcom   into the office today.  Fasting blood sugars typically in the high 100s to low 200s.  States prior to bed blood sugars are in the low 100s.     She has hyperlipidemia and takes atorvastatin 40 mg daily.  She denies chest pain but has rare palpitations.    Current regimen:   Lantus 40 units daily, Humalog 25 units with breakfast, 20 units with lunch and dinner.      Last Eye Exam: 01/14/2025  Last Foot Exam: 08/26/2024  Health Maintenance   Topic Date Due   • Diabetic Foot Exam  08/26/2025   • Diabetic Eye Exam  01/14/2026         Review of Systems   Constitutional:  Positive for fatigue. Negative for activity change, appetite change and unexpected weight change.   HENT:  Negative for sore throat and trouble swallowing.    Eyes:  Negative for visual disturbance.   Respiratory:  Positive for shortness of breath (On portable oxygen). Negative for chest tightness.    Cardiovascular:  Negative for chest pain, palpitations and leg swelling.   Gastrointestinal:  Negative for abdominal pain, constipation, diarrhea, nausea and vomiting.   Endocrine: Positive for polydipsia, polyphagia and polyuria. Negative for cold intolerance and heat intolerance.   Genitourinary:  Negative for frequency.        Nocturia   Musculoskeletal:  Positive for gait problem (Utilizes wheelchair).   Skin:  Negative for wound.   Neurological:  Positive for numbness ( bilateral feet). Negative for dizziness, weakness and headaches.   Psychiatric/Behavioral:  Positive for dysphoric mood and sleep disturbance. The patient is not nervous/anxious.     as per HPI    Medical History Reviewed by provider this encounter:     .  Current Outpatient Medications on File Prior to Visit   Medication Sig Dispense Refill   • acetaminophen (TYLENOL) 650 mg CR tablet Take 650 mg by mouth every 8 (eight) hours as needed for mild pain     • albuterol (2.5 mg/3 mL) 0.083 % nebulizer solution Take 3 mL (2.5 mg total) by nebulization every 6 (six) hours as needed  for wheezing or shortness of breath 1080 mL 3   • albuterol (PROVENTIL HFA,VENTOLIN HFA) 90 mcg/act inhaler Inhale 2 puffs every 4 (four) hours as needed for wheezing 8.5 g 5   • apixaban (Eliquis) 5 mg Take 1 tablet (5 mg total) by mouth 2 (two) times a day 180 tablet 3   • arformoterol (BROVANA) 15 mcg/2 mL nebulizer solution Take 2 mL (15 mcg total) by nebulization 2 (two) times a day 60 mL 3   • atorvastatin (LIPITOR) 40 mg tablet Take 1 tablet (40 mg total) by mouth daily 90 tablet 3   • Blood Glucose Monitoring Suppl (Contour Next One) AILYN USE AS DIRECTED 3 TIMES A DAY 1 each 0   • budesonide (Pulmicort) 1 MG/2ML nebulizer solution Take 2 mL (1 mg total) by nebulization daily Rinse mouth after use. 60 mL 3   • bumetanide (BUMEX) 2 mg tablet Take 3 tablets (6 mg total) by mouth 2 (two) times a day 540 tablet 3   • dapagliflozin (Farxiga) 10 MG tablet Take 1 tablet (10 mg total) by mouth daily 90 tablet 3   • diltiazem (CARDIZEM CD) 120 mg 24 hr capsule Take 1 capsule (120 mg total) by mouth daily 90 capsule 3   • DULoxetine (CYMBALTA) 60 mg delayed release capsule Take 1 capsule (60 mg total) by mouth daily 30 capsule 5   • Easy Comfort Pen Needles 33G X 4 MM MISC Use 4 times a day 400 each 3   • Insulin Glargine Solostar (Lantus SoloStar) 100 UNIT/ML SOPN Inject 40 units daily at bedtime 45 mL 3   • insulin lispro (HumaLOG KwikPen) 100 units/mL injection pen 25 units in am, 20 units at lunch and supper 60 mL 1   • Insulin Pen Needle (BD Pen Needle Love 2nd Gen) 32G X 4 MM MISC Use daily at bedtime Use 4 new needles daily . 400 each 3   • loratadine (CLARITIN) 10 mg tablet Take 1 tablet (10 mg total) by mouth daily as needed for allergies 30 tablet 0   • metoprolol succinate (TOPROL-XL) 100 mg 24 hr tablet Take 1 tablet (100 mg total) by mouth daily 90 tablet 1   • montelukast (SINGULAIR) 10 mg tablet TAKE ONE TABLET BY MOUTH AT BEDTIME 90 tablet 1   • naloxone (NARCAN) 4 mg/0.1 mL nasal spray Administer 1  spray into a nostril. If breathing does not return to normal or if breathing difficulty resumes after 2-3 minutes, give another dose in the other nostril using a new spray. 1 each 1   • omeprazole (PriLOSEC) 40 MG capsule Take 1 capsule (40 mg total) by mouth daily 90 capsule 1   • pregabalin (LYRICA) 150 mg capsule Take 1 capsule by mouth in the morning     • Revefenacin (Yupelri) 175 MCG/3ML SOLN Take 3 mL (175 mcg total) by nebulization in the morning 90 mL 3   • spironolactone (ALDACTONE) 50 mg tablet Take 2 tablets (100 mg total) by mouth daily 180 tablet 1   • traZODone (DESYREL) 150 mg tablet TAKE ONE TABLET BY MOUTH AT BEDTIME 30 tablet 5   • Vitamin D, Cholecalciferol, 50 MCG (2000 UT) CAPS Take 2,000 Int'l Units/day by mouth in the morning 90 capsule 3   • Contour Next Test test strip USE TO TEST BLOOD SUGAR 3 TIMES A DAY (Patient not taking: Reported on 4/9/2025) 100 strip 3   • [employer] Lancets Thin 26G MISC USE 3 (THREE) TIMES A DAY (Patient not taking: Reported on 4/9/2025) 100 each 5   • EPINEPHrine (EPIPEN) 0.3 mg/0.3 mL SOAJ Inject 0.3 mL (0.3 mg total) into a muscle once for 1 dose 0.6 mL 0   • Ferrous Sulfate 300 MG/6.8ML SOLN Take 6.8 mL by mouth 2 (two) times a day (Patient not taking: Reported on 4/9/2025) 473 mL 0   • fluticasone (FLONASE) 50 mcg/act nasal spray 1 spray into each nostril 2 (two) times a day (Patient not taking: Reported on 4/9/2025) 8 g 0   • levalbuterol (XOPENEX) 1.25 mg/0.5 mL nebulizer solution Take 0.5 mL (1.25 mg total) by nebulization 2 (two) times a day (Patient not taking: Reported on 4/9/2025) 60 vial 0   • nystatin (MYCOSTATIN) powder Apply topically 2 (two) times a day (Patient not taking: Reported on 4/9/2025) 30 g 0   • [DISCONTINUED] pregabalin (LYRICA) 100 mg capsule Take 1 capsule by mouth in the morning     • [DISCONTINUED] semaglutide, 1 mg/dose, (Ozempic, 1 MG/DOSE,) 4 mg/3 mL injection pen INJECT 0.75 ML (1 MG TOTAL) UNDER THE SKIN EVERY 7 DAYS (Patient not  "taking: No sig reported) 3 mL 5     No current facility-administered medications on file prior to visit.      Social History     Tobacco Use   • Smoking status: Former     Current packs/day: 0.00     Average packs/day: 0.3 packs/day for 29.9 years (7.5 ttl pk-yrs)     Types: Cigarettes     Start date:      Quit date: 12/10/2019     Years since quittin.3   • Smokeless tobacco: Never   Vaping Use   • Vaping status: Never Used   Substance and Sexual Activity   • Alcohol use: Not Currently     Alcohol/week: 20.0 standard drinks of alcohol     Types: 20 Cans of beer per week     Comment: about 6 coors light every night, stopped in 2019   • Drug use: No   • Sexual activity: Not Currently        Medical History Reviewed by provider this encounter:     .    Objective   /62   Pulse 86   Ht 5' 3\" (1.6 m)   Wt 123 kg (271 lb)   BMI 48.01 kg/m²      Body mass index is 48.01 kg/m².  Wt Readings from Last 3 Encounters:   25 123 kg (271 lb)   25 119 kg (262 lb 9.6 oz)   24 118 kg (260 lb 1.6 oz)     Physical Exam  Vitals and nursing note reviewed.   Constitutional:       General: She is not in acute distress.     Appearance: Normal appearance. She is well-developed. She is not diaphoretic.   Eyes:      General: No scleral icterus.     Extraocular Movements: Extraocular movements intact.      Conjunctiva/sclera: Conjunctivae normal.      Pupils: Pupils are equal, round, and reactive to light.   Cardiovascular:      Rate and Rhythm: Normal rate and regular rhythm.      Pulses: Normal pulses.      Heart sounds: Normal heart sounds. No murmur heard.     No friction rub. No gallop.   Pulmonary:      Effort: Pulmonary effort is normal. No tachypnea, bradypnea or respiratory distress.      Breath sounds: Normal breath sounds. No wheezing.      Comments: On portable oxygen concentrator  Musculoskeletal:      Cervical back: Normal range of motion.      Right lower leg: Edema present.      Left lower leg: " Edema present.   Lymphadenopathy:      Cervical: No cervical adenopathy.   Skin:     General: Skin is warm and dry.   Neurological:      Mental Status: She is alert and oriented to person, place, and time. Mental status is at baseline. She is not disoriented.      Motor: No abnormal muscle tone.      Gait: Gait abnormal (Utilizing wheelchair).      Deep Tendon Reflexes: Reflexes are normal and symmetric.   Psychiatric:         Mood and Affect: Mood normal.         Behavior: Behavior normal.         Thought Content: Thought content normal.         Labs:   Lab Results   Component Value Date    HGBA1C 8.8 (A) 04/22/2025    HGBA1C 8.5 (H) 09/10/2024    HGBA1C 7.7 (A) 06/17/2024     Lab Results   Component Value Date    CREATININE 0.80 09/10/2024    CREATININE 0.85 09/10/2024    CREATININE 0.84 09/10/2024    BUN 19 09/10/2024     09/22/2017    K 4.6 09/10/2024    CL 93 (L) 09/10/2024    CO2 30 (H) 09/10/2024     eGFR   Date Value Ref Range Status   09/10/2024 82 >59 mL/min/1.73 Final   03/10/2024 86 ml/min/1.73sq m Final     Lab Results   Component Value Date    CHOL 150 09/22/2017    HDL 42 09/10/2024    TRIG 75 09/10/2024    CHOLHDL 2.1 09/10/2024     Lab Results   Component Value Date    ALT 15 09/10/2024    AST 16 09/10/2024    ALKPHOS 137 (H) 03/08/2024    BILITOT 0.4 09/22/2017     Lab Results   Component Value Date    QVQ2ANBOGDOM 2.131 03/08/2024    NTQ1KFPNMLDU 3.197 01/22/2024    JTY0YDEEVIAQ 3.257 11/30/2018       Patient Instructions   Monitor diet.  Make sure to drink plenty water throughout the day.     Due to insurance coverage, will start Mounjaro 5 mg weekly.     Continue Lantus and Apidra.     Call in blood sugars in about 2 weeks so we can make adjustments.     Contact the office with any concerns or questions.     Follow-up in 3 months with lab work completed prior to visit.    Discussed with the patient and all questioned fully answered. She will call me if any problems arise.

## 2025-04-22 NOTE — ASSESSMENT & PLAN NOTE
A1c has increased since last office visit, this could be due to discontinuing a GLP-1.  At this time she is interested in restarting a GLP-1.  Was previously on Ozempic, but insurance is no longer covering that so we will switch her to Mounjaro 5 mg daily.  She will continue with Lantus 40 units daily and Humalog 25 units with breakfast and 20 units with lunch and dinner.  Did discuss side effects of medication once again.  Continue utilizing Dexcom to monitor glucose levels.  If there is any issues, please contact the office so we can make adjustments to her insulin.  Lab Results   Component Value Date    HGBA1C 8.8 (A) 04/22/2025       Orders:  •  Hemoglobin A1C; Future  •  Comprehensive metabolic panel; Future  •  Albumin / creatinine urine ratio; Future  •  Lipid panel; Future  •  Tirzepatide (Mounjaro) 7.5 MG/0.5ML SOAJ; Inject 7.5 mg under the skin once a week  •  Ambulatory Referral to Social Work Care Management Program; Future  •  POCT hemoglobin A1c

## 2025-04-22 NOTE — PATIENT INSTRUCTIONS
Monitor diet.  Make sure to drink plenty water throughout the day.     Due to insurance coverage, will start Mounjaro 5 mg weekly.     Continue Lantus and Apidra.     Call in blood sugars in about 2 weeks so we can make adjustments.     Contact the office with any concerns or questions.     Follow-up in 3 months with lab work completed prior to visit.

## 2025-04-22 NOTE — PROGRESS NOTES
OP CM rcvd referral from [facility] [place] for pt.  Called to pt and she states she thought she has Medicare and then an Aetna Supplement but her pharmacy is telling her something different.  Pt states her pharmacy is now stating she has [employer] [phone].  Pt states she has never signed up for this.  Called to [facility] and spoke to Jorge who states that pts RX drug plan through [facility] was terminated in Jan 2025 due to non payment.  Jorge placed us on a hold to investigate this further.  PCP documented Ozempic is no longer covered but none of pts meds are covered now bc her RX plan is terminated.  Jorge states that there is nothing they can do and they cannot reinstate the plan.  Pt cannot re-enroll until open enrollment in October.  Pt also given the number for [facility] [phone].      Pt resides alone in a ranch home.  Pt is on 4L 02.  Pt has a caregiver on Wed who helps her shower, clean, and do laundry.  Explained home health waiver but pt is over income.  Pt was not  to a  so she is not eligible for Vet Assist.  Pt gets M[employer] Meals.  Pt is able to walk indep at home.  Pt uses a rollator when she cooks.      Pt has a dx of depression.  Pt states that she does not want a therapist or a psychiatrist and she is not interested services at this time.      Pts only income is SSD.  Pts income is low enough to qualify for PACENET.  Pt is aware that with Pacenet her generic medication is $9 and brand is $15.  Called to [employer]ET with pt on three way call and spoke to Linnea who assisted in completing the application.  Linnea completed pts application and advised pt to call back on Thursday.      Pt states her friend took her to an appt but her friend is not always available.  Pt is interested in [facility].      Explained that referral will be placed to [facility] to assist with [facility] transport.      Pt will call back OP CM on Thursday to follow up PACENET.

## 2025-04-24 ENCOUNTER — PATIENT OUTREACH (OUTPATIENT)
Dept: CASE MANAGEMENT | Facility: OTHER | Age: 65
End: 2025-04-24

## 2025-04-24 NOTE — PROGRESS NOTES
OP CM rcvd call from pt in regards to PACENET.  Called to PACENET [phone] and spoke to Kamryn and pt was approved and will get her card in one week.  PACENET ID  [id number].  Pt is aware this will make her generic medications no more than $9 and no more than $15 for brand.  Pt denies any other needs at this time.

## 2025-04-24 NOTE — PROGRESS NOTES
[facility] received patient from Outreach Pool. [facility] sent SW an IB to be added to care plan tasks.     [facility] called patient. Introduced self, role, and reason for call.     [facility]OC began asking questions pertaining to transportation application.     Can patient ambulate up 3 to 4 steps to get onto transport w/railing on each side- Yes  Any assisted devices- Yes (sometimes a walker)  PCA needed to travel- No    Patient then explained that she does need to be pushed in a wheelchair from door to Dr's office. Patient uses Dr office supplied wheelchair. CMOC asked who does that now for patient and patient explained the friend who takes her to appointments. [facility] explained that the  will not do that.  This is why she would need a PCA.     Patient stated that she will stick with the friend who takes her to appointments then.     No further outreach needed.     CMOC to close.

## 2025-04-25 ENCOUNTER — TELEPHONE (OUTPATIENT)
Age: 65
End: 2025-04-25

## 2025-04-25 NOTE — TELEPHONE ENCOUNTER
Patient called stating that Tariq needs a prior authorization.       Patient would like a call back with an update.

## 2025-05-02 ENCOUNTER — TELEPHONE (OUTPATIENT)
Age: 65
End: 2025-05-02

## 2025-05-13 ENCOUNTER — TELEPHONE (OUTPATIENT)
Age: 65
End: 2025-05-13

## 2025-05-16 ENCOUNTER — HOSPITAL ENCOUNTER (OUTPATIENT)
Dept: INFUSION CENTER | Facility: HOSPITAL | Age: 65
End: 2025-05-16
Attending: INTERNAL MEDICINE
Payer: MEDICARE

## 2025-05-16 VITALS
DIASTOLIC BLOOD PRESSURE: 68 MMHG | OXYGEN SATURATION: 94 % | TEMPERATURE: 96.8 F | SYSTOLIC BLOOD PRESSURE: 118 MMHG | HEART RATE: 95 BPM | RESPIRATION RATE: 16 BRPM

## 2025-05-16 DIAGNOSIS — J44.89 ASTHMA-COPD OVERLAP SYNDROME (HCC): Primary | ICD-10-CM

## 2025-05-16 RX ORDER — EPINEPHRINE 1 MG/ML
0.3 INJECTION, SOLUTION, CONCENTRATE INTRAVENOUS
Status: DISCONTINUED | OUTPATIENT
Start: 2025-05-16 | End: 2025-05-19 | Stop reason: HOSPADM

## 2025-05-16 RX ORDER — EPINEPHRINE 1 MG/ML
0.3 INJECTION, SOLUTION, CONCENTRATE INTRAVENOUS
OUTPATIENT
Start: 2025-06-13

## 2025-05-16 RX ADMIN — BENRALIZUMAB 30 MG: 30 INJECTION, SOLUTION SUBCUTANEOUS at 11:58

## 2025-05-16 NOTE — PROGRESS NOTES
Mattie Joy  tolerated treatment well with no complications.    Epi pen chair side exp date 6/26  Mattie Joy is aware of future appt on 7/11 at 1230.     AVS printed and given to Mattie Joy:    No (Declined by Mattie Joy)

## 2025-05-16 NOTE — PLAN OF CARE
Problem: Potential for Falls  Goal: Patient will remain free of falls  Description: INTERVENTIONS:  - Educate patient/family on patient safety including physical limitations  - Instruct patient to call for assistance with activity   - Consider consulting OT/PT to assist with strengthening/mobility based on AM PAC & JH-HLM score  - Consult OT/PT to assist with strengthening/mobility   - Keep Call bell within reach  - Keep bed low and locked with side rails adjusted as appropriate  - Keep care items and personal belongings within reach  - Initiate and maintain comfort rounds  - Make Fall Risk Sign visible to staff  - Apply yellow socks and bracelet for high fall risk patients  - Consider moving patient to room near nurses station  5/16/2025 1154 by Molly Madison RN  Outcome: Progressing  5/16/2025 1154 by Molly Madison RN  Outcome: Progressing     Problem: Knowledge Deficit  Goal: Patient/family/caregiver demonstrates understanding of disease process, treatment plan, medications, and discharge instructions  Description: Complete learning assessment and assess knowledge base.  Interventions:  - Provide teaching at level of understanding  - Provide teaching via preferred learning methods  Outcome: Progressing

## 2025-05-19 DIAGNOSIS — G47.00 INSOMNIA, UNSPECIFIED TYPE: ICD-10-CM

## 2025-05-19 DIAGNOSIS — F32.1 CURRENT MODERATE EPISODE OF MAJOR DEPRESSIVE DISORDER WITHOUT PRIOR EPISODE (HCC): ICD-10-CM

## 2025-05-19 RX ORDER — DULOXETIN HYDROCHLORIDE 60 MG/1
60 CAPSULE, DELAYED RELEASE ORAL DAILY
Qty: 30 CAPSULE | Refills: 5 | Status: SHIPPED | OUTPATIENT
Start: 2025-05-19

## 2025-05-19 RX ORDER — TRAZODONE HYDROCHLORIDE 150 MG/1
150 TABLET ORAL
Qty: 30 TABLET | Refills: 5 | Status: SHIPPED | OUTPATIENT
Start: 2025-05-19

## 2025-05-20 ENCOUNTER — OFFICE VISIT (OUTPATIENT)
Age: 65
End: 2025-05-20
Payer: MEDICARE

## 2025-05-20 VITALS
TEMPERATURE: 97 F | HEIGHT: 63 IN | WEIGHT: 268.8 LBS | OXYGEN SATURATION: 94 % | HEART RATE: 86 BPM | DIASTOLIC BLOOD PRESSURE: 84 MMHG | BODY MASS INDEX: 47.63 KG/M2 | SYSTOLIC BLOOD PRESSURE: 132 MMHG

## 2025-05-20 DIAGNOSIS — G47.33 OBSTRUCTIVE SLEEP APNEA: ICD-10-CM

## 2025-05-20 DIAGNOSIS — J96.12 CHRONIC RESPIRATORY FAILURE WITH HYPOXIA AND HYPERCAPNIA (HCC): Primary | ICD-10-CM

## 2025-05-20 DIAGNOSIS — J45.50 SEVERE PERSISTENT ASTHMA, UNSPECIFIED WHETHER COMPLICATED: ICD-10-CM

## 2025-05-20 DIAGNOSIS — J96.11 CHRONIC RESPIRATORY FAILURE WITH HYPOXIA AND HYPERCAPNIA (HCC): Primary | ICD-10-CM

## 2025-05-20 DIAGNOSIS — J01.90 ACUTE NON-RECURRENT SINUSITIS, UNSPECIFIED LOCATION: ICD-10-CM

## 2025-05-20 DIAGNOSIS — E66.01 MORBID OBESITY (HCC): ICD-10-CM

## 2025-05-20 DIAGNOSIS — J44.89 ASTHMA-COPD OVERLAP SYNDROME (HCC): ICD-10-CM

## 2025-05-20 PROCEDURE — G2211 COMPLEX E/M VISIT ADD ON: HCPCS

## 2025-05-20 PROCEDURE — 99214 OFFICE O/P EST MOD 30 MIN: CPT

## 2025-05-20 NOTE — ASSESSMENT & PLAN NOTE
Secondary to COPD. With chronic evidence of hypercapnia, she is maintained on Astral NIV for management. She is at high risk for respiratory failure without NIV at home.   Recently switched to iVAPS due to recurrent exacerbations and hypoventilation  Continue on 4L continuous oxygen

## 2025-05-20 NOTE — ASSESSMENT & PLAN NOTE
Continue Fasenra injections every other month   Continue singulair daily  Orders:    Complete PFT with post bronchodilator; Future    Ambulatory Referral to Pulmonary Rehabilitation; Future

## 2025-05-20 NOTE — ASSESSMENT & PLAN NOTE
BMI noted at 47.62  Recently started on mounjaro for weight loss  Discussed importance of weight loss and the role it plays in WILMA and breathing

## 2025-05-20 NOTE — ASSESSMENT & PLAN NOTE
They are using the NIV and benefitting from it.  Better quality of sleep at night and more energy throughout the day.  Reviewed compliance report 4/21/25 - 5/20/25 with the patient, demonstrating residual AHI is acceptable at 0.5 and they are 93% compliant with use  Will continue iVAPs at current pressure settings  Discussed regular cleaning and changing of the supplies  Patient is aware of consequences of untreated sleep apnea including increased risk for cardiac disease and stroke and therefore the need for compliance

## 2025-05-20 NOTE — ASSESSMENT & PLAN NOTE
Transitioned to all nebulized regimen due to ineffectiveness of prior inhalers. Symptoms remain uncontrolled, patient with ongoing shortness of breath with very minimal activity  Continue pulmicort BID, brovana BID, yupelri daily. Has not used in past week. Discussed the importance of remaining compliant with her inhaled regimen, she will resume these today  Patient inquiring and interested in EBV. Provided patient with information handout. Will have her repeat PFTs and enroll in pulmonary rehab. Discussed she could need to lose considerable amount of weight to be eligible.  Orders:    Complete PFT with post bronchodilator; Future    Ambulatory Referral to Pulmonary Rehabilitation; Future

## 2025-05-20 NOTE — PROGRESS NOTES
Follow-up  Visit - Pulmonary Medicine   Name: Mattie Joy      : 1960      MRN: [id number]  Encounter Provider: VICKY Hernandez  Encounter Date: 2025   Encounter department: [facility]  :  Assessment & Plan  Chronic respiratory failure with hypoxia and hypercapnia (HCC)  Secondary to COPD. With chronic evidence of hypercapnia, she is maintained on Astral NIV for management. She is at high risk for respiratory failure without NIV at home.   Recently switched to iVAPS due to recurrent exacerbations and hypoventilation  Continue on 4L continuous oxygen        Obstructive sleep apnea  They are using the NIV and benefitting from it.  Better quality of sleep at night and more energy throughout the day.  Reviewed compliance report 25 - 25 with the patient, demonstrating residual AHI is acceptable at 0.5 and they are 93% compliant with use  Will continue iVAPs at current pressure settings  Discussed regular cleaning and changing of the supplies  Patient is aware of consequences of untreated sleep apnea including increased risk for cardiac disease and stroke and therefore the need for compliance       Asthma-COPD overlap syndrome (HCC)  Transitioned to all nebulized regimen due to ineffectiveness of prior inhalers. Symptoms remain uncontrolled, patient with ongoing shortness of breath with very minimal activity  Continue pulmicort BID, brovana BID, yupelri daily. Has not used in past week. Discussed the importance of remaining compliant with her inhaled regimen, she will resume these today  Patient inquiring and interested in EBV. Provided patient with information handout. Will have her repeat PFTs and enroll in pulmonary rehab. Discussed she could need to lose considerable amount of weight to be eligible.  Orders:    Complete PFT with post bronchodilator; Future    Ambulatory Referral to Pulmonary Rehabilitation; Future    Severe persistent asthma,  unspecified whether complicated  Continue Fasenra injections every other month   Continue singulair daily  Orders:    Complete PFT with post bronchodilator; Future    Ambulatory Referral to Pulmonary Rehabilitation; Future    Morbid obesity (HCC)  BMI noted at 47.62  Recently started on mounjaro for weight loss  Discussed importance of weight loss and the role it plays in WILMA and breathing       Acute non-recurrent sinusitis, unspecified location  Patient with sinus pain and pressure and green nasal drainage over the past 1-2 weeks. Suspect she may have acute sinus infection.  Start augmentin for 5 day course  Orders:    amoxicillin-clavulanate (AUGMENTIN) 875-125 mg per tablet; Take 1 tablet by mouth every 12 (twelve) hours for 5 days      Return in about 7 weeks (around 7/8/2025) for Next scheduled follow up.    History of Present Illness   Mattie Joy is a 64 y.o. female who presents for follow up.    She is not feeling well today, over past 1-2 weeks has had sinus pain and pressure as well as rhinorrhea, blowing out green nasal drainage. She also endorses increased shortness of breath due do congestion. Once she clears out her nose, is able to breath a bit better. Due to increased shortness of breath, she has not been taking her nebulizers for the past week. She was recently transitioned from inhalers to all nebulized regimen. She states she feels the nebulizers work better than the inhalers, however still feels her symptoms are not well controlled with the nebulizers. She easily becomes very short of breath.    Her  accompanies her to the visit today and is present during its entirety.       Review of Systems   Constitutional:  Negative for chills and fever.   HENT:  Positive for congestion, rhinorrhea, sinus pressure and sinus pain. Negative for facial swelling and trouble swallowing.    Respiratory:  Positive for cough and shortness of breath. Negative for wheezing.    Cardiovascular:  Negative for  "chest pain.   Gastrointestinal:  Negative for nausea and vomiting.   Musculoskeletal:  Negative for arthralgias and back pain.   Neurological:  Negative for syncope.       Aside from what is mentioned in the HPI, ROS is otherwise negative    Medical History Reviewed by provider this encounter:     .  Medications Ordered Prior to Encounter[1]   Social History     Tobacco Use    Smoking status: Former     Current packs/day: 0.00     Average packs/day: 0.3 packs/day for 29.9 years (7.5 ttl pk-yrs)     Types: Cigarettes     Start date:      Quit date: 12/10/2019     Years since quittin.4    Smokeless tobacco: Never   Vaping Use    Vaping status: Never Used   Substance and Sexual Activity    Alcohol use: Not Currently     Alcohol/week: 20.0 standard drinks of alcohol     Types: 20 Cans of beer per week     Comment: about 6 coors light every night, stopped in 2019    Drug use: No    Sexual activity: Not Currently        Medical History Reviewed by provider this encounter:     .    Objective   /84 (BP Location: Left arm, Patient Position: Sitting, Cuff Size: Standard)   Pulse 86   Temp (!) 97 °F (36.1 °C) (Tympanic)   Ht 5' 3\" (1.6 m)   Wt 122 kg (268 lb 12.8 oz)   SpO2 94%   BMI 47.62 kg/m²     Physical Exam  Vitals and nursing note reviewed.   Constitutional:       General: She is not in acute distress.     Appearance: She is well-developed. She is obese.   HENT:      Head: Normocephalic and atraumatic.      Nose: Rhinorrhea present.     Eyes:      Conjunctiva/sclera: Conjunctivae normal.       Cardiovascular:      Rate and Rhythm: Normal rate.      Heart sounds: No murmur heard.  Pulmonary:      Effort: Pulmonary effort is normal. No respiratory distress.      Breath sounds: Decreased breath sounds present. No wheezing or rhonchi.     Musculoskeletal:         General: Normal range of motion.      Cervical back: Normal range of motion and neck supple.     Skin:     General: Skin is warm and dry. "     Neurological:      General: No focal deficit present.      Mental Status: She is alert and oriented to person, place, and time.     Psychiatric:         Mood and Affect: Mood normal.           Diagnostic Data:  Labs: I personally reviewed the most recent laboratory data pertinent to today's visit.      Radiology results:  Radiology Results Review: I have reviewed radiology reports from 3/8/24 including: chest xray.  Mild pulmonary venous congestion, similar to previous study.     PFT/spirometry results, 11/10/22:  Very severe obstructive airflow defect with reduced vital capacity     Significant response to the administration to bronchodilator per ATS Standards     Normal lung volumes with increased residual volume indicative of airtrapping     Moderate diffusion defect     Obstructed flow volume loops     Decreased 6MWT distance        Abbie Wheeler, CALLIE RN FNP-BC  Nurse Practitioner  [facility]           [1]   Current Outpatient Medications on File Prior to Visit   Medication Sig Dispense Refill    acetaminophen (TYLENOL) 650 mg CR tablet Take 650 mg by mouth every 8 (eight) hours as needed for mild pain      albuterol (2.5 mg/3 mL) 0.083 % nebulizer solution Take 3 mL (2.5 mg total) by nebulization every 6 (six) hours as needed for wheezing or shortness of breath 1080 mL 3    albuterol (PROVENTIL HFA,VENTOLIN HFA) 90 mcg/act inhaler Inhale 2 puffs every 4 (four) hours as needed for wheezing 8.5 g 5    apixaban (Eliquis) 5 mg Take 1 tablet (5 mg total) by mouth 2 (two) times a day 180 tablet 3    arformoterol (BROVANA) 15 mcg/2 mL nebulizer solution Take 2 mL (15 mcg total) by nebulization 2 (two) times a day 60 mL 3    atorvastatin (LIPITOR) 40 mg tablet Take 1 tablet (40 mg total) by mouth daily 90 tablet 3    Blood Glucose Monitoring Suppl (Contour Next One) AILYN USE AS DIRECTED 3 TIMES A DAY 1 each 0    bumetanide (BUMEX) 2 mg tablet Take 3 tablets (6 mg total) by mouth 2  (two) times a day 540 tablet 3    dapagliflozin (Farxiga) 10 MG tablet Take 1 tablet (10 mg total) by mouth daily 90 tablet 3    diltiazem (CARDIZEM CD) 120 mg 24 hr capsule Take 1 capsule (120 mg total) by mouth daily 90 capsule 3    DULoxetine (CYMBALTA) 60 mg delayed release capsule TAKE ONE CAPSULE BY MOUTH DAILY 30 capsule 5    Easy Comfort Pen Needles 33G X 4 MM MISC Use 4 times a day 400 each 3    EPINEPHrine (EPIPEN) 0.3 mg/0.3 mL SOAJ Inject 0.3 mL (0.3 mg total) into a muscle once for 1 dose 0.6 mL 0    Insulin Glargine Solostar (Lantus SoloStar) 100 UNIT/ML SOPN Inject 40 units daily at bedtime 45 mL 3    insulin lispro (HumaLOG KwikPen) 100 units/mL injection pen 25 units in am, 20 units at lunch and supper 60 mL 1    Insulin Pen Needle (BD Pen Needle Love 2nd Gen) 32G X 4 MM MISC Use daily at bedtime Use 4 new needles daily . 400 each 3    loratadine (CLARITIN) 10 mg tablet Take 1 tablet (10 mg total) by mouth daily as needed for allergies 30 tablet 0    metoprolol succinate (TOPROL-XL) 100 mg 24 hr tablet Take 1 tablet (100 mg total) by mouth daily 90 tablet 1    montelukast (SINGULAIR) 10 mg tablet TAKE ONE TABLET BY MOUTH AT BEDTIME 90 tablet 1    naloxone (NARCAN) 4 mg/0.1 mL nasal spray Administer 1 spray into a nostril. If breathing does not return to normal or if breathing difficulty resumes after 2-3 minutes, give another dose in the other nostril using a new spray. 1 each 1    omeprazole (PriLOSEC) 40 MG capsule Take 1 capsule (40 mg total) by mouth daily 90 capsule 1    pregabalin (LYRICA) 150 mg capsule Take 1 capsule by mouth in the morning      Revefenacin (Yupelri) 175 MCG/3ML SOLN Take 3 mL (175 mcg total) by nebulization in the morning 90 mL 3    spironolactone (ALDACTONE) 50 mg tablet Take 2 tablets (100 mg total) by mouth daily 180 tablet 1    Tirzepatide (Mounjaro) 7.5 MG/0.5ML SOAJ Inject 7.5 mg under the skin once a week 6 mL 1    traZODone (DESYREL) 150 mg tablet TAKE ONE TABLET BY  MOUTH AT BEDTIME 30 tablet 5    Vitamin D, Cholecalciferol, 50 MCG (2000 UT) CAPS Take 2,000 Int'l Units/day by mouth in the morning 90 capsule 3    budesonide (Pulmicort) 1 MG/2ML nebulizer solution Take 2 mL (1 mg total) by nebulization daily Rinse mouth after use. (Patient not taking: Reported on 5/20/2025) 60 mL 3    Contour Next Test test strip USE TO TEST BLOOD SUGAR 3 TIMES A DAY (Patient not taking: Reported on 4/9/2025) 100 strip 3    [employer] Lancets Thin 26G MISC USE 3 (THREE) TIMES A DAY (Patient not taking: Reported on 4/9/2025) 100 each 5    Ferrous Sulfate 300 MG/6.8ML SOLN Take 6.8 mL by mouth 2 (two) times a day (Patient not taking: Reported on 4/9/2025) 473 mL 0    fluticasone (FLONASE) 50 mcg/act nasal spray 1 spray into each nostril 2 (two) times a day (Patient not taking: Reported on 4/9/2025) 8 g 0    levalbuterol (XOPENEX) 1.25 mg/0.5 mL nebulizer solution Take 0.5 mL (1.25 mg total) by nebulization 2 (two) times a day (Patient not taking: Reported on 4/9/2025) 60 vial 0    nystatin (MYCOSTATIN) powder Apply topically 2 (two) times a day (Patient not taking: Reported on 4/9/2025) 30 g 0     No current facility-administered medications on file prior to visit.

## 2025-05-28 ENCOUNTER — DOCUMENTATION (OUTPATIENT)
Dept: ENDOCRINOLOGY | Facility: HOSPITAL | Age: 65
End: 2025-05-28

## 2025-05-28 ENCOUNTER — HOSPITAL ENCOUNTER (OUTPATIENT)
Dept: PULMONOLOGY | Facility: HOSPITAL | Age: 65
Discharge: HOME | End: 2025-05-28
Payer: MEDICARE

## 2025-05-28 DIAGNOSIS — J44.89 ASTHMA-COPD OVERLAP SYNDROME (HCC): ICD-10-CM

## 2025-05-28 DIAGNOSIS — J45.50 SEVERE PERSISTENT ASTHMA, UNSPECIFIED WHETHER COMPLICATED: ICD-10-CM

## 2025-05-28 PROCEDURE — 94060 EVALUATION OF WHEEZING: CPT

## 2025-05-28 PROCEDURE — 94729 DIFFUSING CAPACITY: CPT

## 2025-05-28 PROCEDURE — 94726 PLETHYSMOGRAPHY LUNG VOLUMES: CPT | Performed by: INTERNAL MEDICINE

## 2025-05-28 PROCEDURE — 94726 PLETHYSMOGRAPHY LUNG VOLUMES: CPT

## 2025-05-28 PROCEDURE — 94760 N-INVAS EAR/PLS OXIMETRY 1: CPT

## 2025-05-28 PROCEDURE — 94060 EVALUATION OF WHEEZING: CPT | Performed by: INTERNAL MEDICINE

## 2025-05-28 RX ORDER — ALBUTEROL SULFATE 0.83 MG/ML
2.5 SOLUTION RESPIRATORY (INHALATION) ONCE
Status: COMPLETED | OUTPATIENT
Start: 2025-05-28 | End: 2025-05-28

## 2025-05-28 RX ADMIN — ALBUTEROL SULFATE 2.5 MG: 2.5 SOLUTION RESPIRATORY (INHALATION) at 12:17

## 2025-05-28 NOTE — PROGRESS NOTES
The office received a copy of the approval letter for the patient's Lantus through her pace.    It will be scanned into her chart.

## 2025-06-02 ENCOUNTER — RESULTS FOLLOW-UP (OUTPATIENT)
Age: 65
End: 2025-06-02

## 2025-06-03 ENCOUNTER — CONSULT (OUTPATIENT)
Age: 65
End: 2025-06-03
Payer: MEDICARE

## 2025-06-03 VITALS — BODY MASS INDEX: 47.47 KG/M2 | WEIGHT: 268 LBS

## 2025-06-03 DIAGNOSIS — D48.5 NEOPLASM OF UNCERTAIN BEHAVIOR OF SKIN: ICD-10-CM

## 2025-06-03 DIAGNOSIS — L82.1 SEBORRHEIC KERATOSIS: Primary | ICD-10-CM

## 2025-06-03 DIAGNOSIS — L81.4 LENTIGO: ICD-10-CM

## 2025-06-03 DIAGNOSIS — D18.01 CHERRY ANGIOMA: ICD-10-CM

## 2025-06-03 DIAGNOSIS — D22.9 MULTIPLE BENIGN MELANOCYTIC NEVI: ICD-10-CM

## 2025-06-03 DIAGNOSIS — B07.9 VERRUCA VULGARIS: ICD-10-CM

## 2025-06-03 PROCEDURE — 99204 OFFICE O/P NEW MOD 45 MIN: CPT | Performed by: REGISTERED NURSE

## 2025-06-03 PROCEDURE — 88305 TISSUE EXAM BY PATHOLOGIST: CPT | Performed by: PATHOLOGY

## 2025-06-03 PROCEDURE — 11102 TANGNTL BX SKIN SINGLE LES: CPT | Performed by: REGISTERED NURSE

## 2025-06-03 NOTE — PATIENT INSTRUCTIONS
"INFORMED CONSENT DISCUSSION AND POST-OPERATIVE INSTRUCTIONS FOR PATIENT    I.  RATIONALE FOR PROCEDURE  I understand that a skin biopsy allows the Dermatologist to test a lesion or rash under the microscope to obtain a diagnosis.  It usually involves numbing the area with numbing medication and removing a small piece of skin; sometimes the area will be closed with sutures. In this specific procedure, sutures are not usually needed.  If any sutures are placed, then they are usually need to be removed in 2 weeks or less.    I understand that my Dermatologist recommends that a skin \"shave\" biopsy be performed today.  A local anesthetic, similar to the kind that a dentist uses when filling a cavity, will be injected with a very small needle into the skin area to be sampled.  The injected skin and tissue underneath \"will go to sleep” and become numb so no pain should be felt afterwards.  An instrument shaped like a tiny \"razor blade\" (shave biopsy instrument) will be used to cut a small piece of tissue and skin from the area so that a sample of tissue can be taken and examined more closely under the microscope.  A slight amount of bleeding will occur, but it will be stopped with direct pressure and a pressure bandage and any other appropriate methods.  I understands that a scar will form where the wound was created.  Surgical ointment will be applied to help protect the wound.  Sutures are not usually needed.    II.  RISKS AND POTENTIAL COMPLICATIONS   I understand the risks and potential complications of a skin biopsy include but are not limited to the following:  Bleeding  Infection  Pain  Scar/keloid  Skin discoloration  Incomplete Removal  Recurrence  Nerve Damage/Numbness/Loss of Function  Allergic Reaction to Anesthesia  Biopsies are diagnostic procedures and based on findings additional treatment or evaluation may be required  Loss or destruction of specimen resulting in no additional findings    My Dermatologist " "has explained to me the nature of the condition, the nature of the procedure, and the benefits to be reasonably expected compared with alternative approaches.  My Dermatologist has discussed the likelihood of major risks or complications of this procedure including the specific risks listed above, such as bleeding, infection, and scarring/keloid.  I understand that a scar is expected after this procedure.  I understand that my physician cannot predict if the scar will form a \"keloid,\" which extends beyond the borders of the wound that is created.  A keloid is a thick, painful, and bumpy scar.  A keloid can be difficult to treat, as it does not always respond well to therapy, which includes injecting cortisone directly into the keloid every few weeks.  While this usually reduces the pain and size of the scar, it does not eliminate it.      I understand that photographs may be taken before and after the procedure.  These will be maintained as part of the medical providers confidential records and may not be made available to me.  I further authorize the medical provider to use the photographs for teaching purposes or to illustrate scientific papers, books, or lectures if in his/her judgment, medical research, education, or science may benefit from its use.    I have had an opportunity to fully inquire about the risks and benefits of this procedure and its alternatives.   I have been given ample time and opportunity to ask questions and to seek a second opinion if I wished to do so.  I acknowledge that there have specifically been no guarantees as to the cosmetic results from the procedure.  I am aware that with any procedure there is always the possibility of an unexpected complication.    III. POST-PROCEDURAL CARE (WHAT YOU WILL NEED TO DO \"AFTER THE BIOPSY\" TO OPTIMIZE HEALING)    Keep the area clean and dry.  Try NOT to remove the bandage or get it wet for the first 24 hours.    Gently clean the area and apply " "surgical ointment (such as Vaseline petrolatum ointment, which is available \"over the counter\" and not a prescription) to the biopsy site for up to 2 weeks straight.  This acts to protect the wound from the outside world.      Sutures are not usually placed in this procedure.  If any sutures were placed, return for suture removal as instructed (generally 1 week for the face, 2 weeks for the body).      Take Acetaminophen (Tylenol) for discomfort, if no contraindications.  Ibuprofen or aspirin could make bleeding worse.    Call our office immediately for signs of infection: fever, chills, increased redness, warmth, tenderness, discomfort/pain, or pus or foul smell coming from the wound.    WHAT TO DO IF THERE IS ANY BLEEDING?  If a small amount of bleeding is noticed, place a clean cloth over the area and apply firm pressure for ten minutes.  Check the wound after 10 minutes of direct pressure.  If bleeding persists, try one more time for an additional 10 minutes of direct pressure on the area.  If the bleeding becomes heavier or does not stop after the second attempt, or if you have any other questions about this procedure, then please call your [facility]'s Dermatologist by calling [phone] (SKIN).     I hereby acknowledge that I have reviewed and verified the site with my Dermatologist and have requested and authorized my Dermatologist to proceed with the procedure.  "

## 2025-06-05 ENCOUNTER — TELEPHONE (OUTPATIENT)
Age: 65
End: 2025-06-05

## 2025-06-05 DIAGNOSIS — I50.32 CHRONIC DIASTOLIC CHF (CONGESTIVE HEART FAILURE) (HCC): ICD-10-CM

## 2025-06-05 PROCEDURE — 88305 TISSUE EXAM BY PATHOLOGIST: CPT | Performed by: PATHOLOGY

## 2025-06-05 NOTE — TELEPHONE ENCOUNTER
"Hello,    The following message was sent via e-mail to the leadership team:     Please advise if you can help facilitate the following overbook request:    Patient Name: Mattie Joy    Patient MRN: [id number]    Call back #: [phone]    Insurance: Medicare A and B /[employer]    Department:Cardiology    Speciality: General Cardiology    Reason for overbook request: PATIENT REQUEST    Comments (Write \"N/a\" if no comments): Overdue follow up     Requested doctor and location: Dr Acharya    Date of current appointment: 01/13/2026      Thank you.    "

## 2025-06-05 NOTE — TELEPHONE ENCOUNTER
Reason for call:   [x] Refill   [] Prior Auth  [] Other:     Office:   [] PCP/Provider -   [x] Specialty/Provider - [facility]     Medication: spironolactone (ALDACTONE) 50 mg tablet     Dose/Frequency: 100 mg, Daily     Quantity: 180    Pharmacy: [facility]   Does the patient have enough for 3 days?   [] Yes   [x] No - Send as HP to POD    Mail Away Pharmacy   Does the patient have enough for 10 days?   [] Yes   [] No - Send as HP to POD

## 2025-06-06 ENCOUNTER — NURSE TRIAGE (OUTPATIENT)
Age: 65
End: 2025-06-06

## 2025-06-06 RX ORDER — SPIRONOLACTONE 50 MG/1
100 TABLET, FILM COATED ORAL DAILY
Qty: 180 TABLET | Refills: 0 | Status: SHIPPED | OUTPATIENT
Start: 2025-06-06

## 2025-06-06 NOTE — TELEPHONE ENCOUNTER
----- Message from Dixie FONSECA sent at 6/6/2025  9:07 AM EDT -----  Patient called in yesterday to make an appointment with Dr Pacheco. Our PEP asked the patient if she was having an active symptoms and the patient stated that she was having trouble breathing and could not exert herself. The call was not sent to Triage while the patient was on the line. Could someone please reach out to her to triage and determine if she should be seen sooner? Thank you!

## 2025-06-06 NOTE — TELEPHONE ENCOUNTER
"REASON FOR CONVERSATION: Breathing Difficulty    SYMPTOMS: Pt feels symptoms seem to be from the COPD. She wears O2 continuously @ 4L and seen by pulmonology 5/20.   Weight is up to 265 10 lbs from November. She denies any LE edema and taking meds/diuretics as directed with a positive output.   Reporting ongoing RICCI that resolves with rest    OTHER HEALTH INFORMATION: Pt missed an appt in March and was calling to reschedule.     PROTOCOL DISPOSITION: Discuss With PCP and Callback by Nurse Within 1 Hour. Agree with being placed on the overbook request for a sooner appt.     CARE ADVICE PROVIDED: advised will call back to schedule a sooner appt.     PRACTICE FOLLOW-UP: n/a    Reason for Disposition   Nurse judgment    Answer Assessment - Initial Assessment Questions  1. MAIN CONCERN OR SYMPTOM: \"What's your main concern?\" (e.g., low oxygen level, breathing difficulty) \"What question do you have?\"      Ongoing RICCI  2.  OXYGEN EQUIPMENT:  Are you having trouble with your oxygen equipment?  (e.g., cannula, mask, tubing, tank, concentrator)      Wearing O2  3. ONSET: \"When did the  symptoms  start?\"       ongoing  4. OXYGEN THERAPY:       4Lvia n/c  5. PULSE OXIMETER:       100%  6. O2 MONITORING: \"What is the oxygen level (pulse ox reading)?\" (e.g., %)      100%  7. VITAL SIGNS MONITORING: \"Do you monitor/measure your oxygen level or vital signs (e.g., yes, no, measurements are automatically sent to provider/call center). Document CURRENT and NORMAL BASELINE values if available.        HR 66  8. BREATHING DIFFICULTY: \"Are you having any difficulty breathing?\" If Yes, ask: \"How bad is it?\"  (e.g., none, mild, moderate, severe)       moderate  9. OTHER SYMPTOMS: \"Do you have any other symptoms?\" (e.g., fever, change in sputum)      denies  10. SMOKING: \"Do you smoke currently?\" \"Is there anyone that smokes around you?\"  (Note: smoking around oxygen is dangerous!)        unsure    Protocols used: COPD Oxygen " Monitoring and Hypoxia-Adult-OH

## 2025-06-11 ENCOUNTER — RESULTS FOLLOW-UP (OUTPATIENT)
Dept: DERMATOLOGY | Facility: CLINIC | Age: 65
End: 2025-06-11

## 2025-06-16 DIAGNOSIS — E11.22 TYPE 2 DIABETES MELLITUS WITH STAGE 2 CHRONIC KIDNEY DISEASE, WITH LONG-TERM CURRENT USE OF INSULIN (HCC): ICD-10-CM

## 2025-06-16 DIAGNOSIS — N18.2 TYPE 2 DIABETES MELLITUS WITH STAGE 2 CHRONIC KIDNEY DISEASE, WITH LONG-TERM CURRENT USE OF INSULIN (HCC): ICD-10-CM

## 2025-06-16 DIAGNOSIS — Z79.4 TYPE 2 DIABETES MELLITUS WITH STAGE 2 CHRONIC KIDNEY DISEASE, WITH LONG-TERM CURRENT USE OF INSULIN (HCC): ICD-10-CM

## 2025-06-17 RX ORDER — INSULIN LISPRO 100 [IU]/ML
INJECTION, SOLUTION INTRAVENOUS; SUBCUTANEOUS
Qty: 60 ML | Refills: 1 | Status: SHIPPED | OUTPATIENT
Start: 2025-06-17

## 2025-07-03 ENCOUNTER — OFFICE VISIT (OUTPATIENT)
Dept: ENDOCRINOLOGY | Facility: HOSPITAL | Age: 65
End: 2025-07-03
Payer: MEDICARE

## 2025-07-03 DIAGNOSIS — E11.42 DIABETIC POLYNEUROPATHY ASSOCIATED WITH TYPE 2 DIABETES MELLITUS (HCC): ICD-10-CM

## 2025-07-03 DIAGNOSIS — N18.2 TYPE 2 DIABETES MELLITUS WITH STAGE 2 CHRONIC KIDNEY DISEASE, WITH LONG-TERM CURRENT USE OF INSULIN (HCC): Primary | ICD-10-CM

## 2025-07-03 DIAGNOSIS — Z79.4 TYPE 2 DIABETES MELLITUS WITH STAGE 2 CHRONIC KIDNEY DISEASE, WITH LONG-TERM CURRENT USE OF INSULIN (HCC): Primary | ICD-10-CM

## 2025-07-03 DIAGNOSIS — E11.22 TYPE 2 DIABETES MELLITUS WITH STAGE 2 CHRONIC KIDNEY DISEASE, WITH LONG-TERM CURRENT USE OF INSULIN (HCC): Primary | ICD-10-CM

## 2025-07-03 PROCEDURE — G2211 COMPLEX E/M VISIT ADD ON: HCPCS | Performed by: INTERNAL MEDICINE

## 2025-07-03 PROCEDURE — 99213 OFFICE O/P EST LOW 20 MIN: CPT | Performed by: INTERNAL MEDICINE

## 2025-07-03 NOTE — ASSESSMENT & PLAN NOTE
Lab Results   Component Value Date    HGBA1C 8.8 (A) 04/22/2025     She will continue her current Farxiga and insulin dosages and follow-up in the office as scheduled later this month with preceding blood work.

## 2025-07-03 NOTE — ASSESSMENT & PLAN NOTE
Lab Results   Component Value Date    HGBA1C 8.8 (A) 04/22/2025     Diabetic foot exam was performed in the office today.         Follow-up with the office in several weeks with preceding blood work for her usual routine endocrine follow-up.

## 2025-07-03 NOTE — PROGRESS NOTES
Name: Mattie Joy      : 1960      MRN: [id number]  Encounter Provider: Petrona Taveras MD  Encounter Date: 7/3/2025   Encounter department: [facility]    No chief complaint on file.  :  Assessment & Plan  Type 2 diabetes mellitus with stage 2 chronic kidney disease, with long-term current use of insulin (HCC)    Lab Results   Component Value Date    HGBA1C 8.8 (A) 2025     She will continue her current Farxiga and insulin dosages and follow-up in the office as scheduled later this month with preceding blood work.         Diabetic polyneuropathy associated with type 2 diabetes mellitus (HCC)    Lab Results   Component Value Date    HGBA1C 8.8 (A) 2025     Diabetic foot exam was performed in the office today.         Follow-up with the office in several weeks with preceding blood work for her usual routine endocrine follow-up.    Assessment & Plan        Pertinent Medical History   Mattie Joy is a 64-year-old female with a history of type 2 diabetes, insulin-requiring, hypertension, and hyperlipidemia, diagnosed with diabetes 10 years ago, who also has diabetic neuropathy and nephropathy .    Diabetes complications include neuropathy, retinopathy, and nephropathy. She reports no heart attack, stroke, or claudication.            History of Present Illness   History of Present Illness    Mattie Joy is a 64 y.o. female with type 2 diabetes, hypertension, hyperlipidemia seen in follow up. Reports complications of neuropathy and retinopathy along with CKD stage II. Denies recent severe hypoglycemic or severe hyperglycemic episodes. Denies any issues with her current regimen. Last A1C was 8.8. Denies recent illness, hospitalization or steroid use.    She is here for diabetic foot exam.      Current diabetic regimen:   She takes Farxiga 10 mg daily, Mounjaro 7.5 mg once a week, Lantus insulin 40 units at bedtime, and Humalog insulin 25 units in the  morning, 20 units at lunch and supper for diabetes.    She takes metoprolol  mg daily, diltiazem 120 mg daily, bumetanide Skyler 2 mg 3 twice a day, and spironolactone 100 mg daily for hypertension.    She takes atorvastatin 40 mg daily for hyperlipidemia.    Review of Systems as per [facility]    Medical History Reviewed by provider this encounter:  Tobacco  Allergies  Meds  Problems  Med Hx  Surg Hx  Fam Hx     .  Medications Ordered Prior to Encounter[1]   Social History[2]     Medical History Reviewed by provider this encounter:  Tobacco  Allergies  Meds  Problems  Med Hx  Surg Hx  Fam Hx     .    Objective   There were no vitals taken for this visit.     There is no height or weight on file to calculate BMI.  Wt Readings from Last 3 Encounters:   06/03/25 122 kg (268 lb)   05/20/25 122 kg (268 lb 12.8 oz)   04/22/25 123 kg (271 lb)     Physical Exam    Physical Exam  Vitals and nursing note reviewed.   Constitutional:       General: She is not in acute distress.     Appearance: Normal appearance. She is well-developed. She is obese.      Comments: Examined in a wheelchair.   HENT:      Head: Normocephalic and atraumatic.     Cardiovascular:      Pulses: Pulses are weak.           Dorsalis pedis pulses are 1+ on the right side and 1+ on the left side.        Posterior tibial pulses are 1+ on the right side and 1+ on the left side.      Comments: 1+ dorsalis pedis and posterior tibialis pulses bilaterally.  Pulmonary:      Effort: Pulmonary effort is normal. No respiratory distress.     Musculoskeletal:         General: No swelling.      Right lower leg: No edema.      Left lower leg: No edema.      Comments: Flat feet. Right 5th toe angled medially.  No ulcerations of the feet.   Feet:      Right foot:      Skin integrity: No ulcer, skin breakdown, erythema, warmth, callus or dry skin.      Left foot:      Skin integrity: No ulcer, skin breakdown, erythema, warmth, callus or dry skin.     Skin:      General: Skin is warm and dry.     Neurological:      Mental Status: She is alert.      Comments: Vibration sensation markedly diminished to the first toe DIP joint bilaterally. Monofilament sensation intact to the right arch, 5th and 1st MP joints, 1st toe and 3rd toe and dorsum and left heel, arch, dorsum, 3rd toe, but was otherwise absent bilaterally.    Psychiatric:         Mood and Affect: Mood normal.     Patient's shoes and socks removed.    Right Foot/Ankle   Right Foot Inspection  Skin Exam: skin normal and skin intact. No dry skin, no warmth, no callus, no erythema, no maceration, no abnormal color, no pre-ulcer, no ulcer and no callus.     Toe Exam: right toe deformity. No swelling    Sensory   Vibration: diminished  Monofilament testing: diminished    Vascular  The right DP pulse is 1+. The right PT pulse is 1+.     Left Foot/Ankle  Left Foot Inspection  Skin Exam: skin normal and skin intact. No dry skin, no warmth, no erythema, no maceration, normal color, no pre-ulcer, no ulcer and no callus.     Toe Exam: No swelling and no left toe deformity.     Sensory   Vibration: diminished  Monofilament testing: diminished    Vascular  The left DP pulse is 1+. The left PT pulse is 1+.     Assign Risk Category  Deformity present  Loss of protective sensation  Weak pulses  Risk: 2    Last Eye Exam: 01/14/2025  Last Foot Exam: 08/26/2024  Health Maintenance   Topic Date Due    Diabetic Foot Exam  08/26/2025    Diabetic Eye Exam  01/14/2026       Results    Labs: I have reviewed pertinent labs including:   Lab Results   Component Value Date    HGBA1C 8.8 (A) 04/22/2025    HGBA1C 8.5 (H) 09/10/2024    HGBA1C 7.7 (A) 06/17/2024      Lab Results   Component Value Date    CREATININE 0.80 09/10/2024    CREATININE 0.85 09/10/2024    CREATININE 0.84 09/10/2024    BUN 19 09/10/2024     09/22/2017    K 4.6 09/10/2024    CL 93 (L) 09/10/2024    CO2 30 (H) 09/10/2024      eGFR   Date Value Ref Range Status   09/10/2024  82 >59 mL/min/1.73 Final   03/10/2024 86 ml/min/1.73sq m Final      Cholesterol   Date Value Ref Range Status   09/22/2017 150 100 - 199 mg/dL Final     HDL   Date Value Ref Range Status   09/10/2024 42 >39 mg/dL Final     Triglycerides   Date Value Ref Range Status   09/10/2024 75 0 - 149 mg/dL Final     T. Chol/HDL Ratio   Date Value Ref Range Status   09/10/2024 2.1 0.0 - 4.4 ratio Final     Comment:                                       T. Chol/HDL Ratio                                              Men  Women                                1/2 Avg.Risk  3.4    3.3                                    Avg.Risk  5.0    4.4                                 2X Avg.Risk  9.6    7.1                                 3X Avg.Risk 23.4   11.0        ALT   Date Value Ref Range Status   09/10/2024 15 0 - 32 IU/L Final     AST   Date Value Ref Range Status   09/10/2024 16 0 - 40 IU/L Final     Alkaline Phosphatase   Date Value Ref Range Status   03/08/2024 137 (H) 34 - 104 U/L Final   09/22/2017 199 (H) 39 - 117 IU/L Final     Total Bilirubin   Date Value Ref Range Status   09/22/2017 0.4 0.0 - 1.2 mg/dL Final      Lab Results   Component Value Date    [id number] 2.131 03/08/2024        Patient Instructions   Foot exam done.     Follow up with Som later this month as planned.     Discussed with the patient and all questioned fully answered. She will call me if any problems arise.           [1]   Current Outpatient Medications on File Prior to Visit   Medication Sig Dispense Refill    acetaminophen (TYLENOL) 650 mg CR tablet Take 650 mg by mouth every 8 (eight) hours as needed for mild pain      albuterol (2.5 mg/3 mL) 0.083 % nebulizer solution Take 3 mL (2.5 mg total) by nebulization every 6 (six) hours as needed for wheezing or shortness of breath 1080 mL 3    albuterol (PROVENTIL HFA,VENTOLIN HFA) 90 mcg/act inhaler Inhale 2 puffs every 4 (four) hours as needed for wheezing 8.5 g 5    apixaban (Eliquis) 5 mg Take 1 tablet  (5 mg total) by mouth 2 (two) times a day 180 tablet 3    atorvastatin (LIPITOR) 40 mg tablet Take 1 tablet (40 mg total) by mouth daily 90 tablet 3    Blood Glucose Monitoring Suppl (Contour Next One) AILYN USE AS DIRECTED 3 TIMES A DAY 1 each 0    bumetanide (BUMEX) 2 mg tablet Take 3 tablets (6 mg total) by mouth 2 (two) times a day 540 tablet 3    dapagliflozin (Farxiga) 10 MG tablet Take 1 tablet (10 mg total) by mouth daily 90 tablet 3    diltiazem (CARDIZEM CD) 120 mg 24 hr capsule Take 1 capsule (120 mg total) by mouth daily 90 capsule 3    DULoxetine (CYMBALTA) 60 mg delayed release capsule TAKE ONE CAPSULE BY MOUTH DAILY 30 capsule 5    Easy Comfort Pen Needles 33G X 4 MM MISC Use 4 times a day 400 each 3    EPINEPHrine (EPIPEN) 0.3 mg/0.3 mL SOAJ Inject 0.3 mL (0.3 mg total) into a muscle once for 1 dose 0.6 mL 0    Insulin Glargine Solostar (Lantus SoloStar) 100 UNIT/ML SOPN Inject 40 units daily at bedtime 45 mL 3    insulin lispro (HumaLOG KwikPen) 100 units/mL injection pen INJECT 25 SUBCUTANEOUSLY IN THE MORNING, AND 20 UNITS AT LUNCH AND 20 UNITS AT SUPPER 60 mL 1    Insulin Pen Needle (BD Pen Needle Love 2nd Gen) 32G X 4 MM MISC Use daily at bedtime Use 4 new needles daily . 400 each 3    loratadine (CLARITIN) 10 mg tablet Take 1 tablet (10 mg total) by mouth daily as needed for allergies 30 tablet 0    metoprolol succinate (TOPROL-XL) 100 mg 24 hr tablet Take 1 tablet (100 mg total) by mouth daily 90 tablet 1    montelukast (SINGULAIR) 10 mg tablet TAKE ONE TABLET BY MOUTH AT BEDTIME 90 tablet 1    naloxone (NARCAN) 4 mg/0.1 mL nasal spray Administer 1 spray into a nostril. If breathing does not return to normal or if breathing difficulty resumes after 2-3 minutes, give another dose in the other nostril using a new spray. 1 each 1    omeprazole (PriLOSEC) 40 MG capsule Take 1 capsule (40 mg total) by mouth daily 90 capsule 1    pregabalin (LYRICA) 150 mg capsule Take 1 capsule by mouth in the  morning      Revefenacin (Yupelri) 175 MCG/3ML SOLN Take 3 mL (175 mcg total) by nebulization in the morning 90 mL 3    spironolactone (ALDACTONE) 50 mg tablet Take 2 tablets (100 mg total) by mouth daily 180 tablet 0    Tirzepatide (Mounjaro) 7.5 MG/0.5ML SOAJ Inject 7.5 mg under the skin once a week 6 mL 1    traZODone (DESYREL) 150 mg tablet TAKE ONE TABLET BY MOUTH AT BEDTIME 30 tablet 5    Vitamin D, Cholecalciferol, 50 MCG (2000 UT) CAPS Take 2,000 Int'l Units/day by mouth in the morning 90 capsule 3    arformoterol (BROVANA) 15 mcg/2 mL nebulizer solution Take 2 mL (15 mcg total) by nebulization 2 (two) times a day 60 mL 3    budesonide (Pulmicort) 1 MG/2ML nebulizer solution Take 2 mL (1 mg total) by nebulization daily Rinse mouth after use. (Patient not taking: Reported on 2025) 60 mL 3    Contour Next Test test strip USE TO TEST BLOOD SUGAR 3 TIMES A DAY (Patient not taking: Reported on 2025) 100 strip 3    [employer] Lancets Thin 26G MISC USE 3 (THREE) TIMES A DAY (Patient not taking: Reported on 2025) 100 each 5    Ferrous Sulfate 300 MG/6.8ML SOLN Take 6.8 mL by mouth 2 (two) times a day (Patient not taking: Reported on 2025) 473 mL 0    fluticasone (FLONASE) 50 mcg/act nasal spray 1 spray into each nostril 2 (two) times a day (Patient not taking: Reported on 2025) 8 g 0    levalbuterol (XOPENEX) 1.25 mg/0.5 mL nebulizer solution Take 0.5 mL (1.25 mg total) by nebulization 2 (two) times a day (Patient not taking: Reported on 2025) 60 vial 0    nystatin (MYCOSTATIN) powder Apply topically 2 (two) times a day (Patient not taking: Reported on 2025) 30 g 0     No current facility-administered medications on file prior to visit.   [2]   Social History  Tobacco Use    Smoking status: Former     Current packs/day: 0.00     Average packs/day: 0.3 packs/day for 29.9 years (7.5 ttl pk-yrs)     Types: Cigarettes     Start date:      Quit date: 12/10/2019     Years since quittin.5     Smokeless tobacco: Never   Vaping Use    Vaping status: Never Used   Substance and Sexual Activity    Alcohol use: Not Currently     Alcohol/week: 20.0 standard drinks of alcohol     Types: 20 Cans of beer per week     Comment: about 6 coors light every night, stopped in 2019    Drug use: No    Sexual activity: Not Currently

## 2025-07-09 ENCOUNTER — TELEPHONE (OUTPATIENT)
Age: 65
End: 2025-07-09

## 2025-07-09 NOTE — TELEPHONE ENCOUNTER
"Nathalie, a palliative care  from [facility], is calling because the patient needs clinical notes sent over to re qualify for insurance to pay for her oxygen. Patient's POC batter is not lasting for more than an hour. Nathalie called the DME and was told they need to get the patient's most recent oxygen test results along with the most recent clinical notes stating the medical necessity for the oxygen and they also need a cub note sent as well. The fax number provided is [phone] and put \"Attention: Asset Recovery\"        Please advise.  "

## 2025-07-11 ENCOUNTER — TELEPHONE (OUTPATIENT)
Dept: CARDIAC REHAB | Facility: HOSPITAL | Age: 65
End: 2025-07-11

## 2025-07-11 ENCOUNTER — HOSPITAL ENCOUNTER (OUTPATIENT)
Dept: INFUSION CENTER | Facility: HOSPITAL | Age: 65
DRG: 190 | End: 2025-07-11
Attending: INTERNAL MEDICINE
Payer: MEDICARE

## 2025-07-11 VITALS
RESPIRATION RATE: 16 BRPM | OXYGEN SATURATION: 97 % | DIASTOLIC BLOOD PRESSURE: 59 MMHG | HEART RATE: 87 BPM | TEMPERATURE: 96.4 F | SYSTOLIC BLOOD PRESSURE: 92 MMHG

## 2025-07-11 DIAGNOSIS — J44.89 ASTHMA-COPD OVERLAP SYNDROME (HCC): Primary | ICD-10-CM

## 2025-07-11 PROCEDURE — 96372 THER/PROPH/DIAG INJ SC/IM: CPT

## 2025-07-11 RX ORDER — EPINEPHRINE 1 MG/ML
0.3 INJECTION, SOLUTION, CONCENTRATE INTRAVENOUS
OUTPATIENT
Start: 2025-08-08

## 2025-07-11 RX ORDER — EPINEPHRINE 1 MG/ML
0.3 INJECTION, SOLUTION, CONCENTRATE INTRAVENOUS
Status: DISCONTINUED | OUTPATIENT
Start: 2025-07-11 | End: 2025-07-14 | Stop reason: HOSPADM

## 2025-07-11 RX ADMIN — BENRALIZUMAB 30 MG: 30 INJECTION, SOLUTION SUBCUTANEOUS at 12:49

## 2025-07-11 NOTE — PROGRESS NOTES
..Mattie Joy  tolerated treatment well with no complications.      Mattie Joy is aware of future appt on 9/5 at 12:30.     AVS printed and given to Mattie Joy:  No (Declined by Mattie Joy)

## 2025-07-13 ENCOUNTER — APPOINTMENT (EMERGENCY)
Dept: CT IMAGING | Facility: HOSPITAL | Age: 65
DRG: 190 | End: 2025-07-13
Payer: MEDICARE

## 2025-07-13 ENCOUNTER — APPOINTMENT (EMERGENCY)
Dept: RADIOLOGY | Facility: HOSPITAL | Age: 65
DRG: 190 | End: 2025-07-13
Payer: MEDICARE

## 2025-07-13 ENCOUNTER — HOSPITAL ENCOUNTER (INPATIENT)
Facility: HOSPITAL | Age: 65
LOS: 3 days | Discharge: HOME | DRG: 190 | End: 2025-07-17
Attending: EMERGENCY MEDICINE | Admitting: INTERNAL MEDICINE
Payer: MEDICARE

## 2025-07-13 DIAGNOSIS — R09.02 HYPOXIA: ICD-10-CM

## 2025-07-13 DIAGNOSIS — J44.1 COPD EXACERBATION (HCC): Primary | ICD-10-CM

## 2025-07-13 DIAGNOSIS — R06.02 SOB (SHORTNESS OF BREATH): ICD-10-CM

## 2025-07-13 LAB
2HR DELTA HS TROPONIN: 0 NG/L
ALBUMIN SERPL BCG-MCNC: 4.4 G/DL (ref 3.5–5)
ALP SERPL-CCNC: 114 U/L (ref 34–104)
ALT SERPL W P-5'-P-CCNC: 16 U/L (ref 7–52)
ANION GAP SERPL CALCULATED.3IONS-SCNC: 6 MMOL/L (ref 4–13)
AST SERPL W P-5'-P-CCNC: 21 U/L (ref 13–39)
ATRIAL RATE: 63 BPM
BACTERIA UR QL AUTO: NORMAL /HPF
BASOPHILS # BLD AUTO: 0.03 THOUSANDS/ÂΜL (ref 0–0.1)
BASOPHILS NFR BLD AUTO: 0 % (ref 0–1)
BILIRUB SERPL-MCNC: 0.33 MG/DL (ref 0.2–1)
BILIRUB UR QL STRIP: NEGATIVE
BNP SERPL-MCNC: 193 PG/ML (ref 0–100)
BUN SERPL-MCNC: 16 MG/DL (ref 5–25)
CALCIUM SERPL-MCNC: 8.8 MG/DL (ref 8.4–10.2)
CARDIAC TROPONIN I PNL SERPL HS: 4 NG/L (ref ?–50)
CARDIAC TROPONIN I PNL SERPL HS: 4 NG/L (ref ?–50)
CHLORIDE SERPL-SCNC: 95 MMOL/L (ref 96–108)
CLARITY UR: CLEAR
CO2 SERPL-SCNC: 33 MMOL/L (ref 21–32)
COLOR UR: COLORLESS
CREAT SERPL-MCNC: 1.45 MG/DL (ref 0.6–1.3)
EOSINOPHIL # BLD AUTO: 0 THOUSAND/ÂΜL (ref 0–0.61)
EOSINOPHIL NFR BLD AUTO: 0 % (ref 0–6)
ERYTHROCYTE [DISTWIDTH] IN BLOOD BY AUTOMATED COUNT: 22.2 % (ref 11.6–15.1)
FLUAV RNA RESP QL NAA+PROBE: NEGATIVE
FLUBV RNA RESP QL NAA+PROBE: NEGATIVE
GFR SERPL CREATININE-BSD FRML MDRD: 37 ML/MIN/1.73SQ M
GLUCOSE SERPL-MCNC: 196 MG/DL (ref 65–140)
GLUCOSE UR STRIP-MCNC: ABNORMAL MG/DL
HCT VFR BLD AUTO: 33.5 % (ref 34.8–46.1)
HGB BLD-MCNC: 9 G/DL (ref 11.5–15.4)
HGB UR QL STRIP.AUTO: ABNORMAL
IMM GRANULOCYTES # BLD AUTO: 0.09 THOUSAND/UL (ref 0–0.2)
IMM GRANULOCYTES NFR BLD AUTO: 1 % (ref 0–2)
KETONES UR STRIP-MCNC: NEGATIVE MG/DL
LACTATE SERPL-SCNC: 1.5 MMOL/L (ref 0.5–2)
LEUKOCYTE ESTERASE UR QL STRIP: NEGATIVE
LYMPHOCYTES # BLD AUTO: 2.01 THOUSANDS/ÂΜL (ref 0.6–4.47)
LYMPHOCYTES NFR BLD AUTO: 15 % (ref 14–44)
MCH RBC QN AUTO: 16.5 PG (ref 26.8–34.3)
MCHC RBC AUTO-ENTMCNC: 26.9 G/DL (ref 31.4–37.4)
MCV RBC AUTO: 62 FL (ref 82–98)
MONOCYTES # BLD AUTO: 1.07 THOUSAND/ÂΜL (ref 0.17–1.22)
MONOCYTES NFR BLD AUTO: 8 % (ref 4–12)
NEUTROPHILS # BLD AUTO: 10.52 THOUSANDS/ÂΜL (ref 1.85–7.62)
NEUTS SEG NFR BLD AUTO: 76 % (ref 43–75)
NITRITE UR QL STRIP: NEGATIVE
NON-SQ EPI CELLS URNS QL MICRO: NORMAL /HPF
NRBC BLD AUTO-RTO: 0 /100 WBCS
PH UR STRIP.AUTO: 5 [PH]
PLATELET # BLD AUTO: 398 THOUSANDS/UL (ref 149–390)
PMV BLD AUTO: 10.1 FL (ref 8.9–12.7)
POTASSIUM SERPL-SCNC: 4.2 MMOL/L (ref 3.5–5.3)
PROCALCITONIN SERPL-MCNC: 0.09 NG/ML
PROT SERPL-MCNC: 7.2 G/DL (ref 6.4–8.4)
PROT UR STRIP-MCNC: NEGATIVE MG/DL
QRS AXIS: 95 DEGREES
QRSD INTERVAL: 76 MS
QT INTERVAL: 356 MS
QTC INTERVAL: 428 MS
RBC # BLD AUTO: 5.45 MILLION/UL (ref 3.81–5.12)
RBC #/AREA URNS AUTO: NORMAL /HPF
RSV RNA RESP QL NAA+PROBE: NEGATIVE
SARS-COV-2 RNA RESP QL NAA+PROBE: NEGATIVE
SODIUM SERPL-SCNC: 134 MMOL/L (ref 135–147)
SP GR UR STRIP.AUTO: <1.005 (ref 1–1.03)
T WAVE AXIS: 84 DEGREES
UROBILINOGEN UR STRIP-ACNC: <2 MG/DL
VENTRICULAR RATE: 87 BPM
WBC # BLD AUTO: 13.72 THOUSAND/UL (ref 4.31–10.16)
WBC #/AREA URNS AUTO: NORMAL /HPF

## 2025-07-13 PROCEDURE — 96365 THER/PROPH/DIAG IV INF INIT: CPT

## 2025-07-13 PROCEDURE — 80053 COMPREHEN METABOLIC PANEL: CPT | Performed by: EMERGENCY MEDICINE

## 2025-07-13 PROCEDURE — 93005 ELECTROCARDIOGRAM TRACING: CPT

## 2025-07-13 PROCEDURE — 85025 COMPLETE CBC W/AUTO DIFF WBC: CPT | Performed by: EMERGENCY MEDICINE

## 2025-07-13 PROCEDURE — 99285 EMERGENCY DEPT VISIT HI MDM: CPT

## 2025-07-13 PROCEDURE — 93010 ELECTROCARDIOGRAM REPORT: CPT | Performed by: INTERNAL MEDICINE

## 2025-07-13 PROCEDURE — 94640 AIRWAY INHALATION TREATMENT: CPT

## 2025-07-13 PROCEDURE — 84484 ASSAY OF TROPONIN QUANT: CPT | Performed by: EMERGENCY MEDICINE

## 2025-07-13 PROCEDURE — 81001 URINALYSIS AUTO W/SCOPE: CPT | Performed by: EMERGENCY MEDICINE

## 2025-07-13 PROCEDURE — 0241U HB NFCT DS VIR RESP RNA 4 TRGT: CPT | Performed by: EMERGENCY MEDICINE

## 2025-07-13 PROCEDURE — 71275 CT ANGIOGRAPHY CHEST: CPT

## 2025-07-13 PROCEDURE — 83605 ASSAY OF LACTIC ACID: CPT | Performed by: EMERGENCY MEDICINE

## 2025-07-13 PROCEDURE — 99285 EMERGENCY DEPT VISIT HI MDM: CPT | Performed by: EMERGENCY MEDICINE

## 2025-07-13 PROCEDURE — 96375 TX/PRO/DX INJ NEW DRUG ADDON: CPT

## 2025-07-13 PROCEDURE — 71046 X-RAY EXAM CHEST 2 VIEWS: CPT

## 2025-07-13 PROCEDURE — 84145 PROCALCITONIN (PCT): CPT | Performed by: EMERGENCY MEDICINE

## 2025-07-13 PROCEDURE — 87040 BLOOD CULTURE FOR BACTERIA: CPT | Performed by: EMERGENCY MEDICINE

## 2025-07-13 PROCEDURE — 83880 ASSAY OF NATRIURETIC PEPTIDE: CPT | Performed by: EMERGENCY MEDICINE

## 2025-07-13 PROCEDURE — 36415 COLL VENOUS BLD VENIPUNCTURE: CPT | Performed by: EMERGENCY MEDICINE

## 2025-07-13 RX ORDER — METHYLPREDNISOLONE SODIUM SUCCINATE 125 MG/2ML
60 INJECTION, POWDER, LYOPHILIZED, FOR SOLUTION INTRAMUSCULAR; INTRAVENOUS ONCE
Status: COMPLETED | OUTPATIENT
Start: 2025-07-13 | End: 2025-07-13

## 2025-07-13 RX ORDER — MAGNESIUM SULFATE HEPTAHYDRATE 40 MG/ML
2 INJECTION, SOLUTION INTRAVENOUS ONCE
Status: COMPLETED | OUTPATIENT
Start: 2025-07-13 | End: 2025-07-13

## 2025-07-13 RX ORDER — ALBUTEROL SULFATE 5 MG/ML
5 SOLUTION RESPIRATORY (INHALATION) ONCE
Status: COMPLETED | OUTPATIENT
Start: 2025-07-13 | End: 2025-07-13

## 2025-07-13 RX ADMIN — METHYLPREDNISOLONE SODIUM SUCCINATE 60 MG: 125 INJECTION, POWDER, FOR SOLUTION INTRAMUSCULAR; INTRAVENOUS at 18:56

## 2025-07-13 RX ADMIN — MAGNESIUM SULFATE HEPTAHYDRATE 2 G: 2 INJECTION, SOLUTION INTRAVENOUS at 19:01

## 2025-07-13 RX ADMIN — IPRATROPIUM BROMIDE 0.5 MG: 0.5 SOLUTION RESPIRATORY (INHALATION) at 18:56

## 2025-07-13 RX ADMIN — IOHEXOL 85 ML: 350 INJECTION, SOLUTION INTRAVENOUS at 20:33

## 2025-07-13 RX ADMIN — ALBUTEROL SULFATE 5 MG: 2.5 SOLUTION RESPIRATORY (INHALATION) at 18:56

## 2025-07-13 NOTE — ED PROVIDER NOTES
Time reflects when diagnosis was documented in both MDM as applicable and the Disposition within this note       Time User Action Codes Description Comment    7/14/2025 12:31 AM Paul Landry [J44.1] COPD exacerbation (HCC)     7/14/2025 12:31 AM Pual Landry Add [R09.02] Hypoxia     7/14/2025 12:31 AM Paul Landry Add [R06.02] SOB (shortness of breath)           ED Disposition       ED Disposition   Admit    Condition   Stable    Date/Time   Mon Jul 14, 2025 12:31 AM    Comment   Case was discussed with Hospitalist Priscilla and the patient's admission status was agreed to be Admission Status: inpatient status to the service of Dr. Marsh.               Assessment & Plan       Medical Decision Making  Patient is tachycardic and tachypneic meeting SIRS criteria.    Obtain septic workup, chest x-ray, EKG  Give steroids, neb treatment and continue to monitor patient for any worsening symptoms    Patient has a leukocytosis as well as tachycardia and tachypnea triggering SIRS.  Patient feels significantly better after steroids and neb treatment.  CT lung did not show any acute abnormality.  No pulmonary embolism noted.  Patient's ambulatory pulse ox was in the 80s on 5 L nasal cannula oxygen.  At this time patient's symptoms are consistent with COPD exacerbation.  Patient will be admitted for further evaluation and management.  Patient agrees with admission plans.    Amount and/or Complexity of Data Reviewed  Labs: ordered.  Radiology: ordered.    Risk  Prescription drug management.  Decision regarding hospitalization.        ED Course as of 07/14/25 0120   Sun Jul 13, 2025   2146 Patient is ambulatory pulse ox is 86% on 5 L nasal, oxygen.  Patient will be admitted after CT chest is resulted.   2330 Spoke to hospitalist team who will admit patient once CT scan is resulted.       Medications   albuterol inhalation solution 2.5 mg (has no administration in time range)   albuterol (2.5 mg/3 mL) 0.083 % inhalation  solution **ADS Override Pull** (has no administration in time range)   ipratropium (ATROVENT) 0.02 % inhalation solution 0.5 mg (0.5 mg Nebulization Given 7/13/25 1856)   albuterol inhalation solution 5 mg (5 mg Nebulization Given 7/13/25 1856)   magnesium sulfate 2 g/50 mL IVPB (premix) 2 g (0 g Intravenous Stopped 7/13/25 1930)   methylPREDNISolone sodium succinate (Solu-MEDROL) injection 60 mg (60 mg Intravenous Given 7/13/25 1856)   iohexol (OMNIPAQUE) 350 MG/ML injection (MULTI-DOSE) 85 mL (85 mL Intravenous Given 7/13/25 2033)       ED Risk Strat Scores                    No data recorded                            History of Present Illness       Chief Complaint   Patient presents with    Shortness of Breath     Worsening over the past 6 months but significantly worse today. Patient on 4L NC at baseline. Last hospitalization about a year ago per patient.        Past Medical History[1]   Past Surgical History[2]   Family History[3]   Social History[4]   E-Cigarette/Vaping    E-Cigarette Use Never User       E-Cigarette/Vaping Substances    Nicotine No     THC No     CBD No     Flavoring No     Other No     Unknown No       I have reviewed and agree with the history as documented.     64-year-old female with past history of GERD, CHF, COPD on 4 L nasal cannula oxygen at baseline, cervical radiculopathy, diabetes, planta fasciitis, WILMA, cigarette smoker, presents to the ED for evaluation of worsening shortness of breath over the past few weeks.  Patient states that she lives alone.  Lately she is feeling so weak that she is not able to give herself her neb treatment.  Patient does have inhaler however she is not using it.  Shortness of breath worsened today and subsequently her friend brought her to the ED for further evaluation.  Patient denies any fevers or chills.  Patient denies any cold or cough symptoms.  Patient denies any recent leg swelling or weight gain.      Shortness of Breath  Associated symptoms:  no abdominal pain, no chest pain, no cough, no ear pain, no fever, no rash, no sore throat and no vomiting        Review of Systems   Constitutional:  Negative for chills and fever.   HENT:  Negative for ear pain and sore throat.    Eyes:  Negative for pain and visual disturbance.   Respiratory:  Positive for shortness of breath. Negative for cough.    Cardiovascular:  Negative for chest pain and palpitations.   Gastrointestinal:  Negative for abdominal pain and vomiting.   Genitourinary:  Negative for dysuria and hematuria.   Musculoskeletal:  Negative for arthralgias and back pain.   Skin:  Negative for color change and rash.   Neurological:  Positive for weakness. Negative for seizures and syncope.   All other systems reviewed and are negative.          Objective       ED Triage Vitals [07/13/25 1830]   Temperature Pulse Blood Pressure Respirations SpO2 Patient Position - Orthostatic VS   98 °F (36.7 °C) 96 142/63 (!) 28 94 % Sitting      Temp Source Heart Rate Source BP Location FiO2 (%) Pain Score    Temporal Monitor Left arm -- --      Vitals      Date and Time Temp Pulse SpO2 Resp BP Pain Score FACES Pain Rating User   07/14/25 0109 97.8 °F (36.6 °C) 111 97 % 20 118/59 -- -- KW   07/14/25 0000 -- 112 97 % -- 127/60 -- -- KW 07/13/25 2300 -- 107 95 % 21 131/59 -- -- KW 07/13/25 2200 98.7 °F (37.1 °C) 101 93 % 21 129/62 -- -- KW 07/13/25 2115 -- 102 95 % 22 111/62 -- -- KW 07/13/25 2106 -- 107 94 % 20 -- -- -- KW 07/13/25 2104 -- 128 86 % 37 -- -- -- KW 07/13/25 2100 -- 103 95 % 24 131/60 -- -- KW 07/13/25 1956 -- 104 -- 19 124/58 -- -- KW 07/13/25 1941 -- 94 95 % 19 102/57 -- -- KW 07/13/25 1926 -- 96 97 % 23 111/64 -- -- KW 07/13/25 1830 98 °F (36.7 °C) 96 94 % 28 142/63 -- -- AP            Physical Exam  Vitals and nursing note reviewed.   Constitutional:       General: She is not in acute distress.     Appearance: She is well-developed.   HENT:      Head: Normocephalic and atraumatic.      Eyes:      Conjunctiva/sclera: Conjunctivae normal.       Cardiovascular:      Rate and Rhythm: Normal rate and regular rhythm.      Heart sounds: No murmur heard.  Pulmonary:      Effort: Respiratory distress present.      Breath sounds: Normal breath sounds.      Comments: Decreased breath sounds noted throughout without any audible wheezing or rales noted.  Patient is tachypneic and appears to be in mild respiratory distress.  Abdominal:      Palpations: Abdomen is soft.      Tenderness: There is no abdominal tenderness.     Musculoskeletal:         General: No swelling.      Cervical back: Neck supple.     Skin:     General: Skin is warm and dry.      Capillary Refill: Capillary refill takes less than 2 seconds.     Neurological:      Mental Status: She is alert.     Psychiatric:         Mood and Affect: Mood normal.         Results Reviewed       Procedure Component Value Units Date/Time    HS Troponin I 2hr [[id number]]  (Normal) Collected: 07/13/25 2100    Lab Status: Final result Specimen: Blood from Arm, Right Updated: 07/13/25 2140     hs TnI 2hr 4 ng/L      Delta 2hr hsTnI 0 ng/L     Procalcitonin [[id number]]  (Normal) Collected: 07/13/25 1841    Lab Status: Final result Specimen: Blood from Arm, Left Updated: 07/13/25 2059     Procalcitonin 0.09 ng/ml     Urine Microscopic [[id number]]  (Normal) Collected: 07/13/25 1909    Lab Status: Final result Specimen: Urine, Clean Catch Updated: 07/13/25 2016     RBC, UA 1-2 /hpf      WBC, UA None Seen /hpf      Epithelial Cells Occasional /hpf      Bacteria, UA None Seen /hpf     HS Troponin I 4hr [[id number]]     Lab Status: No result Specimen: Blood     UA w Reflex to Microscopic w Reflex to Culture [[id number]]  (Abnormal) Collected: 07/13/25 1909    Lab Status: Final result Specimen: Urine, Clean Catch Updated: 07/13/25 1949     Color, UA Colorless     Clarity, UA Clear     Specific Gravity, UA <1.005     pH, UA 5.0     Leukocytes, UA Negative     Nitrite, UA  Negative     Protein, UA Negative mg/dl      Glucose, UA 1000 (1%) mg/dl      Ketones, UA Negative mg/dl      Urobilinogen, UA <2.0 mg/dl      Bilirubin, UA Negative     Occult Blood, UA Small    Blood culture #1 [[id number]] Collected: 07/13/25 1940    Lab Status: In process Specimen: Blood from Hand, Left Updated: 07/13/25 1944    Blood culture #2 [[id number]] Collected: 07/13/25 1932    Lab Status: In process Specimen: Blood from Arm, Right Updated: 07/13/25 1944    B-Type Natriuretic Peptide(BNP) [[id number]]  (Abnormal) Collected: 07/13/25 1841    Lab Status: Final result Specimen: Blood from Arm, Left Updated: 07/13/25 1933      pg/mL     FLU/RSV/COVID - if FLU/RSV clinically relevant (2hr TAT) [[id number]]  (Normal) Collected: 07/13/25 1841    Lab Status: Final result Specimen: Nares from Nose Updated: 07/13/25 1927     SARS-CoV-2 Negative     INFLUENZA A PCR Negative     INFLUENZA B PCR Negative     RSV PCR Negative    Narrative:      This test has been performed using the CoV-2/Flu/RSV plus assay on the [employer] GeneXpert platform. This test has been validated by the  and verified by the performing laboratory.     This test is designed to amplify and detect the following: nucleocapsid (N), envelope (E), and RNA-dependent RNA polymerase (RdRP) genes of the SARS-CoV-2 genome; matrix (M), basic polymerase (PB2), and acidic protein (PA) segments of the influenza A genome; matrix (M) and non-structural protein (NS) segments of the influenza B genome, and the nucleocapsid genes of RSV A and RSV B.     Positive results are indicative of the presence of Flu A, Flu B, RSV, and/or SARS-CoV-2 RNA. Positive results for SARS-CoV-2 or suspected novel influenza should be reported to state, local, or federal health departments according to local reporting requirements.      All results should be assessed in conjunction with clinical presentation and other laboratory markers for clinical management.     FOR  PEDIATRIC PATIENTS - copy/paste COVID Guidelines URL to browser: [email or web address]       Lactic acid, plasma (w/reflex if result > 2.0) [[id number]]  (Normal) Collected: 07/13/25 1901    Lab Status: Final result Specimen: Blood from Arm, Left Updated: 07/13/25 1922     LACTIC ACID 1.5 mmol/L     Narrative:      Result may be elevated if tourniquet was used during collection.    HS Troponin 0hr (reflex protocol) [[id number]]  (Normal) Collected: 07/13/25 1841    Lab Status: Final result Specimen: Blood from Arm, Left Updated: 07/13/25 1913     hs TnI 0hr 4 ng/L     Comprehensive metabolic panel [[id number]]  (Abnormal) Collected: 07/13/25 1841    Lab Status: Final result Specimen: Blood from Arm, Left Updated: 07/13/25 1905     Sodium 134 mmol/L      Potassium 4.2 mmol/L      Chloride 95 mmol/L      CO2 33 mmol/L      ANION GAP 6 mmol/L      BUN 16 mg/dL      Creatinine 1.45 mg/dL      Glucose 196 mg/dL      Calcium 8.8 mg/dL      AST 21 U/L      ALT 16 U/L      Alkaline Phosphatase 114 U/L      Total Protein 7.2 g/dL      Albumin 4.4 g/dL      Total Bilirubin 0.33 mg/dL      eGFR 37 ml/min/1.73sq m     Narrative:      National Kidney Disease Foundation guidelines for Chronic Kidney Disease (CKD):     Stage 1 with normal or high GFR (GFR > 90 mL/min/1.73 square meters)    Stage 2 Mild CKD (GFR = 60-89 mL/min/1.73 square meters)    Stage 3A Moderate CKD (GFR = 45-59 mL/min/1.73 square meters)    Stage 3B Moderate CKD (GFR = 30-44 mL/min/1.73 square meters)    Stage 4 Severe CKD (GFR = 15-29 mL/min/1.73 square meters)    Stage 5 End Stage CKD (GFR <15 mL/min/1.73 square meters)  Note: GFR calculation is accurate only with a steady state creatinine    CBC and differential [[id number]]  (Abnormal) Collected: 07/13/25 1841    Lab Status: Final result Specimen: Blood from Arm, Left Updated: 07/13/25 1851     WBC 13.72 Thousand/uL      RBC 5.45 Million/uL      Hemoglobin  9.0 g/dL      Hematocrit 33.5 %      MCV 62 fL      MCH 16.5 pg      MCHC 26.9 g/dL      RDW 22.2 %      MPV 10.1 fL      Platelets 398 Thousands/uL      nRBC 0 /100 WBCs      Segmented % 76 %      Immature Grans % 1 %      Lymphocytes % 15 %      Monocytes % 8 %      Eosinophils Relative 0 %      Basophils Relative 0 %      Absolute Neutrophils 10.52 Thousands/µL      Absolute Immature Grans 0.09 Thousand/uL      Absolute Lymphocytes 2.01 Thousands/µL      Absolute Monocytes 1.07 Thousand/µL      Eosinophils Absolute 0.00 Thousand/µL      Basophils Absolute 0.03 Thousands/µL             CTA chest pe study   Final Interpretation by Esequiel Qureshi DO (07/14 0019)      No pulmonary embolus.   Main pulmonary arterial enlargement suggestive of pulmonary arterial hypertension.   Mosaic attenuation pattern of the lungs redemonstrated again suggestive of air trapping.                  Computerized Assisted Algorithm (CAA) may have aided analysis of applicable images.      Workstation performed: [id number]         X-ray chest 2 views    (Results Pending)       ECG 12 Lead Documentation Only    Date/Time: 7/13/2025 6:33 PM    Performed by: Paul Landry DO  Authorized by: Paul Landry DO    Indications / Diagnosis:  Shortness of breath  ECG reviewed by me, the ED Provider: yes    Patient location:  ED  Previous ECG:     Previous ECG:  Compared to current    Similarity:  Changes noted    Comparison to cardiac monitor: Yes    Interpretation:     Interpretation: abnormal    Comments:      Irregularly irregular rhythm, right axis deviation, no acute ST/T wave abdomens noted, atrial fibrillation noted, previous EKG showed A-fib RVR that is no longer present.      ED Medication and Procedure Management   Prior to Admission Medications   Prescriptions Last Dose Informant Patient Reported? Taking?   Blood Glucose Monitoring Suppl (Contour Next One) AILYN  Self No No   Sig: USE AS DIRECTED 3 TIMES A DAY   [employer] Lancets Thin 26G  MISC  Self No No   Sig: USE 3 (THREE) TIMES A DAY   Patient not taking: Reported on 4/9/2025   Contour Next Test test strip  Self No No   Sig: USE TO TEST BLOOD SUGAR 3 TIMES A DAY   Patient not taking: Reported on 4/9/2025   DULoxetine (CYMBALTA) 60 mg delayed release capsule  Self No No   Sig: TAKE ONE CAPSULE BY MOUTH DAILY   EPINEPHrine (EPIPEN) 0.3 mg/0.3 mL SOAJ  Self No No   Sig: Inject 0.3 mL (0.3 mg total) into a muscle once for 1 dose   Easy Comfort Pen Needles 33G X 4 MM MISC  Self No No   Sig: Use 4 times a day   Ferrous Sulfate 300 MG/6.8ML SOLN  Self No No   Sig: Take 6.8 mL by mouth 2 (two) times a day   Patient not taking: Reported on 4/9/2025   Insulin Glargine Solostar (Lantus SoloStar) 100 UNIT/ML SOPN  Self No No   Sig: Inject 40 units daily at bedtime   Insulin Pen Needle (BD Pen Needle Love 2nd Gen) 32G X 4 MM MISC  Self No No   Sig: Use daily at bedtime Use 4 new needles daily .   Revefenacin (Yupelri) 175 MCG/3ML SOLN  Self No No   Sig: Take 3 mL (175 mcg total) by nebulization in the morning   Tirzepatide (Mounjaro) 7.5 MG/0.5ML SOAJ  Self No No   Sig: Inject 7.5 mg under the skin once a week   Vitamin D, Cholecalciferol, 50 MCG (2000 UT) CAPS  Self No No   Sig: Take 2,000 Int'l Units/day by mouth in the morning   acetaminophen (TYLENOL) 650 mg CR tablet  Self Yes No   Sig: Take 650 mg by mouth every 8 (eight) hours as needed for mild pain   albuterol (2.5 mg/3 mL) 0.083 % nebulizer solution  Self No No   Sig: Take 3 mL (2.5 mg total) by nebulization every 6 (six) hours as needed for wheezing or shortness of breath   albuterol (PROVENTIL HFA,VENTOLIN HFA) 90 mcg/act inhaler  Self No No   Sig: Inhale 2 puffs every 4 (four) hours as needed for wheezing   apixaban (Eliquis) 5 mg  Self No No   Sig: Take 1 tablet (5 mg total) by mouth 2 (two) times a day   arformoterol (BROVANA) 15 mcg/2 mL nebulizer solution  Self No No   Sig: Take 2 mL (15 mcg total) by nebulization 2 (two) times a day    atorvastatin (LIPITOR) 40 mg tablet  Self No No   Sig: Take 1 tablet (40 mg total) by mouth daily   budesonide (Pulmicort) 1 MG/2ML nebulizer solution  Self No No   Sig: Take 2 mL (1 mg total) by nebulization daily Rinse mouth after use.   Patient not taking: Reported on 2025   bumetanide (BUMEX) 2 mg tablet  Self No No   Sig: Take 3 tablets (6 mg total) by mouth 2 (two) times a day   dapagliflozin (Farxiga) 10 MG tablet  Self No No   Sig: Take 1 tablet (10 mg total) by mouth daily   diltiazem (CARDIZEM CD) 120 mg 24 hr capsule  Self No No   Sig: Take 1 capsule (120 mg total) by mouth daily   fluticasone (FLONASE) 50 mcg/act nasal spray  Self No No   Si spray into each nostril 2 (two) times a day   Patient not taking: Reported on 2025   insulin lispro (HumaLOG KwikPen) 100 units/mL injection pen  Self No No   Sig: INJECT 25 SUBCUTANEOUSLY IN THE MORNING, AND 20 UNITS AT LUNCH AND 20 UNITS AT SUPPER   levalbuterol (XOPENEX) 1.25 mg/0.5 mL nebulizer solution  Self No No   Sig: Take 0.5 mL (1.25 mg total) by nebulization 2 (two) times a day   Patient not taking: Reported on 2025   loratadine (CLARITIN) 10 mg tablet  Self No No   Sig: Take 1 tablet (10 mg total) by mouth daily as needed for allergies   metoprolol succinate (TOPROL-XL) 100 mg 24 hr tablet  Self No No   Sig: Take 1 tablet (100 mg total) by mouth daily   montelukast (SINGULAIR) 10 mg tablet  Self No No   Sig: TAKE ONE TABLET BY MOUTH AT BEDTIME   naloxone (NARCAN) 4 mg/0.1 mL nasal spray  Self No No   Sig: Administer 1 spray into a nostril. If breathing does not return to normal or if breathing difficulty resumes after 2-3 minutes, give another dose in the other nostril using a new spray.   nystatin (MYCOSTATIN) powder  Self No No   Sig: Apply topically 2 (two) times a day   Patient not taking: Reported on 2025   omeprazole (PriLOSEC) 40 MG capsule  Self No No   Sig: Take 1 capsule (40 mg total) by mouth daily   pregabalin (LYRICA)  150 mg capsule  Self Yes No   Sig: Take 1 capsule by mouth in the morning   spironolactone (ALDACTONE) 50 mg tablet  Self No No   Sig: Take 2 tablets (100 mg total) by mouth daily   traZODone (DESYREL) 150 mg tablet  Self No No   Sig: TAKE ONE TABLET BY MOUTH AT BEDTIME      Facility-Administered Medications: None     Patient's Medications   Discharge Prescriptions    No medications on file     No discharge procedures on file.  ED SEPSIS DOCUMENTATION   Time reflects when diagnosis was documented in both MDM as applicable and the Disposition within this note       Time User Action Codes Description Comment    7/14/2025 12:31 AM Paul Landry [J44.1] COPD exacerbation (HCC)     7/14/2025 12:31 AM Paul Landry [R09.02] Hypoxia     7/14/2025 12:31 AM Paul Landry [R06.02] SOB (shortness of breath)                    [1]   Past Medical History:  Diagnosis Date    Acute blood loss anemia 06/01/2021    Acute diverticulitis 05/02/2021    Acute on chronic diastolic congestive heart failure (HCC) 05/21/2020    Acute on chronic respiratory failure with hypoxia (HCC) 11/30/2018    Alcohol abuse 05/21/2020    Anemia     Asthma with COPD with exacerbation (HCC) 11/12/2020    Atrial fibrillation (HCC)     Cervical radiculopathy     CHF (congestive heart failure) (HCC)     Chronic low back pain     Chronic obstructive asthma (HCC) 02/20/2018    Cigarette nicotine dependence without complication 11/20/2019    Quit 12/10/2019.     Community acquired pneumonia 05/21/2020    COPD exacerbation (HCC) 09/16/2020    Depression with anxiety 03/09/2014    Diabetes mellitus with hyperglycemia (HCC)     Diverticulitis 06/06/2021    Elevated liver enzymes     GERD (gastroesophageal reflux disease)     Hypersomnolence 10/28/2020    Hypokalemia 12/04/2018    Lower gastrointestinal bleeding 06/01/2021    Paresthesia of upper extremity     Plantar fasciitis     Restless legs syndrome 04/08/2014    Severe persistent asthma  with exacerbation 2018    PFTs 2018:  FEV1 0.87 L-35% predicted, 27% improvement post-bronchodilator.  DLCO-82% predicted,  The patient has been having worsening of her symptoms now for few months, especially  from 2020, also she had multiple exacerbations last year and most recently required a steroid burst and August  Reviewing her CT scan  New PFTs with severe obstruction reviewed  Hypersensitivity p    Sexual dysfunction     Sleep apnea, obstructive     Tenosynovitis of finger     Tinea corporis     Tobacco use 2018    Currently smoking 3-4 cigarettes daily    Trigger middle finger of left hand 2017    Trigger ring finger of left hand 2017    Weakness of upper extremity    [2]   Past Surgical History:  Procedure Laterality Date    ABDOMINAL SURGERY      CARPAL TUNNEL RELEASE Bilateral      SECTION      CHOLECYSTECTOMY      laparoscopic    ESOPHAGOGASTRODUODENOSCOPY N/A 12/3/2018    Procedure: ESOPHAGOGASTRODUODENOSCOPY (EGD) AND COLONOSCOPY;  Surgeon: Ludmila Perry MD;  Location: [facility];  Service: Gastroenterology    GASTRIC BYPASS      for morbid obesity with gilda en y    HERNIA REPAIR      HYSTERECTOMY      IL EXCISION GANGLION WRIST DORSAL/VOLAR PRIMARY Left 2017    Procedure: LONG FINGER GANGLION CYST EXCISION;  Surgeon: Philippe Clark MD;  Location: [facility];  Service: Orthopedics    IL TENDON SHEATH INCISION Left 2017    Procedure: THUMB, LONG, RING, TRIGGER FINGER RELEASE;  Surgeon: Philippe Clark MD;  Location: [facility];  Service: Orthopedics    SHOULDER SURGERY Right    [3]   Family History  Problem Relation Name Age of Onset    Alzheimer's disease Mother      Atrial fibrillation Mother      Dementia Mother      Heart disease Mother          heart problem    Seizures Mother      Parkinsonism Father      Arthritis Father      Atrial fibrillation Father      No Known Problems Daughter mark     Cri-du-chat syndrome Daughter  peña     Colon cancer Maternal Grandmother  80    Diabetes type II Maternal Grandmother      No Known Problems Brother      No Known Problems Son digna     Substance Abuse Neg Hx          mother,father    Mental illness Neg Hx          mother,father    Colon polyps Neg Hx     [4]   Social History  Tobacco Use    Smoking status: Former     Current packs/day: 0.00     Average packs/day: 0.3 packs/day for 29.9 years (7.5 ttl pk-yrs)     Types: Cigarettes     Start date:      Quit date: 12/10/2019     Years since quittin.5    Smokeless tobacco: Never   Vaping Use    Vaping status: Never Used   Substance Use Topics    Alcohol use: Not Currently     Alcohol/week: 20.0 standard drinks of alcohol     Types: 20 Cans of beer per week     Comment: about 6 [employer] light every night, stopped in     Drug use: No        Paul Landry DO  25 0120

## 2025-07-14 PROBLEM — J96.22 ACUTE ON CHRONIC RESPIRATORY FAILURE WITH HYPOXIA AND HYPERCAPNIA (HCC): Status: ACTIVE | Noted: 2020-11-12

## 2025-07-14 PROBLEM — I50.32 CHRONIC DIASTOLIC HEART FAILURE (HCC): Status: ACTIVE | Noted: 2025-01-09

## 2025-07-14 LAB
ANION GAP SERPL CALCULATED.3IONS-SCNC: 9 MMOL/L (ref 4–13)
BASOPHILS # BLD AUTO: 0.02 THOUSANDS/ÂΜL (ref 0–0.1)
BASOPHILS NFR BLD AUTO: 0 % (ref 0–1)
BUN SERPL-MCNC: 21 MG/DL (ref 5–25)
CALCIUM SERPL-MCNC: 8.6 MG/DL (ref 8.4–10.2)
CHLORIDE SERPL-SCNC: 92 MMOL/L (ref 96–108)
CO2 SERPL-SCNC: 31 MMOL/L (ref 21–32)
CREAT SERPL-MCNC: 0.82 MG/DL (ref 0.6–1.3)
EOSINOPHIL # BLD AUTO: 0 THOUSAND/ÂΜL (ref 0–0.61)
EOSINOPHIL NFR BLD AUTO: 0 % (ref 0–6)
ERYTHROCYTE [DISTWIDTH] IN BLOOD BY AUTOMATED COUNT: 21.7 % (ref 11.6–15.1)
GFR SERPL CREATININE-BSD FRML MDRD: 74 ML/MIN/1.73SQ M
GLUCOSE SERPL-MCNC: 267 MG/DL (ref 65–140)
GLUCOSE SERPL-MCNC: 320 MG/DL (ref 65–140)
GLUCOSE SERPL-MCNC: 350 MG/DL (ref 65–140)
GLUCOSE SERPL-MCNC: 371 MG/DL (ref 65–140)
GLUCOSE SERPL-MCNC: 397 MG/DL (ref 65–140)
GLUCOSE SERPL-MCNC: 460 MG/DL (ref 65–140)
HCT VFR BLD AUTO: 30.6 % (ref 34.8–46.1)
HGB BLD-MCNC: 8.4 G/DL (ref 11.5–15.4)
IMM GRANULOCYTES # BLD AUTO: 0.1 THOUSAND/UL (ref 0–0.2)
IMM GRANULOCYTES NFR BLD AUTO: 1 % (ref 0–2)
LACTATE SERPL-SCNC: 1.7 MMOL/L (ref 0.5–2)
LYMPHOCYTES # BLD AUTO: 0.59 THOUSANDS/ÂΜL (ref 0.6–4.47)
LYMPHOCYTES NFR BLD AUTO: 4 % (ref 14–44)
MCH RBC QN AUTO: 16.4 PG (ref 26.8–34.3)
MCHC RBC AUTO-ENTMCNC: 27.5 G/DL (ref 31.4–37.4)
MCV RBC AUTO: 60 FL (ref 82–98)
MONOCYTES # BLD AUTO: 0.13 THOUSAND/ÂΜL (ref 0.17–1.22)
MONOCYTES NFR BLD AUTO: 1 % (ref 4–12)
NEUTROPHILS # BLD AUTO: 13.04 THOUSANDS/ÂΜL (ref 1.85–7.62)
NEUTS SEG NFR BLD AUTO: 94 % (ref 43–75)
NRBC BLD AUTO-RTO: 0 /100 WBCS
PLATELET # BLD AUTO: 350 THOUSANDS/UL (ref 149–390)
PMV BLD AUTO: 10.2 FL (ref 8.9–12.7)
POTASSIUM SERPL-SCNC: 4.7 MMOL/L (ref 3.5–5.3)
PROCALCITONIN SERPL-MCNC: 0.12 NG/ML
RBC # BLD AUTO: 5.12 MILLION/UL (ref 3.81–5.12)
SODIUM SERPL-SCNC: 132 MMOL/L (ref 135–147)
WBC # BLD AUTO: 13.88 THOUSAND/UL (ref 4.31–10.16)

## 2025-07-14 PROCEDURE — 94660 CPAP INITIATION&MGMT: CPT

## 2025-07-14 PROCEDURE — 83605 ASSAY OF LACTIC ACID: CPT | Performed by: INTERNAL MEDICINE

## 2025-07-14 PROCEDURE — 94640 AIRWAY INHALATION TREATMENT: CPT

## 2025-07-14 PROCEDURE — 94760 N-INVAS EAR/PLS OXIMETRY 1: CPT

## 2025-07-14 PROCEDURE — 94002 VENT MGMT INPAT INIT DAY: CPT

## 2025-07-14 PROCEDURE — 84145 PROCALCITONIN (PCT): CPT | Performed by: INTERNAL MEDICINE

## 2025-07-14 PROCEDURE — 99223 1ST HOSP IP/OBS HIGH 75: CPT | Performed by: INTERNAL MEDICINE

## 2025-07-14 PROCEDURE — 94664 DEMO&/EVAL PT USE INHALER: CPT

## 2025-07-14 PROCEDURE — 99223 1ST HOSP IP/OBS HIGH 75: CPT | Performed by: PHYSICIAN ASSISTANT

## 2025-07-14 PROCEDURE — 82948 REAGENT STRIP/BLOOD GLUCOSE: CPT

## 2025-07-14 PROCEDURE — 85025 COMPLETE CBC W/AUTO DIFF WBC: CPT | Performed by: INTERNAL MEDICINE

## 2025-07-14 PROCEDURE — 80048 BASIC METABOLIC PNL TOTAL CA: CPT | Performed by: INTERNAL MEDICINE

## 2025-07-14 RX ORDER — METOPROLOL SUCCINATE 50 MG/1
100 TABLET, EXTENDED RELEASE ORAL DAILY
Status: DISCONTINUED | OUTPATIENT
Start: 2025-07-14 | End: 2025-07-17 | Stop reason: HOSPADM

## 2025-07-14 RX ORDER — INSULIN LISPRO 100 [IU]/ML
2-12 INJECTION, SOLUTION INTRAVENOUS; SUBCUTANEOUS
Status: DISCONTINUED | OUTPATIENT
Start: 2025-07-14 | End: 2025-07-15

## 2025-07-14 RX ORDER — LEVALBUTEROL INHALATION SOLUTION 1.25 MG/3ML
1.25 SOLUTION RESPIRATORY (INHALATION)
Status: DISCONTINUED | OUTPATIENT
Start: 2025-07-14 | End: 2025-07-17 | Stop reason: HOSPADM

## 2025-07-14 RX ORDER — TRAZODONE HYDROCHLORIDE 150 MG/1
150 TABLET ORAL
Status: DISCONTINUED | OUTPATIENT
Start: 2025-07-14 | End: 2025-07-14

## 2025-07-14 RX ORDER — DULOXETIN HYDROCHLORIDE 30 MG/1
60 CAPSULE, DELAYED RELEASE ORAL DAILY
Status: DISCONTINUED | OUTPATIENT
Start: 2025-07-14 | End: 2025-07-17 | Stop reason: HOSPADM

## 2025-07-14 RX ORDER — MONTELUKAST SODIUM 10 MG/1
10 TABLET ORAL
Status: DISCONTINUED | OUTPATIENT
Start: 2025-07-14 | End: 2025-07-14

## 2025-07-14 RX ORDER — TRAZODONE HYDROCHLORIDE 150 MG/1
150 TABLET ORAL
Status: DISCONTINUED | OUTPATIENT
Start: 2025-07-14 | End: 2025-07-17 | Stop reason: HOSPADM

## 2025-07-14 RX ORDER — PANTOPRAZOLE SODIUM 40 MG/1
40 TABLET, DELAYED RELEASE ORAL
Status: DISCONTINUED | OUTPATIENT
Start: 2025-07-14 | End: 2025-07-17 | Stop reason: HOSPADM

## 2025-07-14 RX ORDER — BUMETANIDE 1 MG/1
6 TABLET ORAL 2 TIMES DAILY
Status: DISCONTINUED | OUTPATIENT
Start: 2025-07-14 | End: 2025-07-17 | Stop reason: HOSPADM

## 2025-07-14 RX ORDER — CEFTRIAXONE 1 G/50ML
1000 INJECTION, SOLUTION INTRAVENOUS EVERY 24 HOURS
Status: DISCONTINUED | OUTPATIENT
Start: 2025-07-14 | End: 2025-07-16

## 2025-07-14 RX ORDER — MONTELUKAST SODIUM 10 MG/1
10 TABLET ORAL
Status: DISCONTINUED | OUTPATIENT
Start: 2025-07-14 | End: 2025-07-17 | Stop reason: HOSPADM

## 2025-07-14 RX ORDER — SPIRONOLACTONE 25 MG/1
100 TABLET ORAL DAILY
Status: DISCONTINUED | OUTPATIENT
Start: 2025-07-14 | End: 2025-07-17 | Stop reason: HOSPADM

## 2025-07-14 RX ORDER — FORMOTEROL FUMARATE 20 UG/2ML
20 SOLUTION RESPIRATORY (INHALATION)
Status: DISCONTINUED | OUTPATIENT
Start: 2025-07-14 | End: 2025-07-17 | Stop reason: HOSPADM

## 2025-07-14 RX ORDER — INSULIN GLARGINE 100 [IU]/ML
40 INJECTION, SOLUTION SUBCUTANEOUS
Status: DISCONTINUED | OUTPATIENT
Start: 2025-07-14 | End: 2025-07-16

## 2025-07-14 RX ORDER — ATORVASTATIN CALCIUM 40 MG/1
40 TABLET, FILM COATED ORAL DAILY
Status: DISCONTINUED | OUTPATIENT
Start: 2025-07-14 | End: 2025-07-17 | Stop reason: HOSPADM

## 2025-07-14 RX ORDER — DILTIAZEM HYDROCHLORIDE 120 MG/1
120 CAPSULE, COATED, EXTENDED RELEASE ORAL DAILY
Status: DISCONTINUED | OUTPATIENT
Start: 2025-07-14 | End: 2025-07-17 | Stop reason: HOSPADM

## 2025-07-14 RX ORDER — ALBUTEROL SULFATE 0.83 MG/ML
2.5 SOLUTION RESPIRATORY (INHALATION) ONCE
Status: COMPLETED | OUTPATIENT
Start: 2025-07-14 | End: 2025-07-14

## 2025-07-14 RX ORDER — BENZONATATE 100 MG/1
100 CAPSULE ORAL 3 TIMES DAILY PRN
Status: DISCONTINUED | OUTPATIENT
Start: 2025-07-14 | End: 2025-07-17 | Stop reason: HOSPADM

## 2025-07-14 RX ORDER — METHYLPREDNISOLONE SODIUM SUCCINATE 40 MG/ML
40 INJECTION, POWDER, LYOPHILIZED, FOR SOLUTION INTRAMUSCULAR; INTRAVENOUS EVERY 8 HOURS SCHEDULED
Status: DISCONTINUED | OUTPATIENT
Start: 2025-07-14 | End: 2025-07-15

## 2025-07-14 RX ORDER — INSULIN LISPRO 100 [IU]/ML
20 INJECTION, SOLUTION INTRAVENOUS; SUBCUTANEOUS
Status: DISCONTINUED | OUTPATIENT
Start: 2025-07-14 | End: 2025-07-16

## 2025-07-14 RX ORDER — ONDANSETRON 2 MG/ML
4 INJECTION INTRAMUSCULAR; INTRAVENOUS EVERY 6 HOURS PRN
Status: DISCONTINUED | OUTPATIENT
Start: 2025-07-14 | End: 2025-07-17 | Stop reason: HOSPADM

## 2025-07-14 RX ORDER — SODIUM CHLORIDE FOR INHALATION 0.9 %
3 VIAL, NEBULIZER (ML) INHALATION EVERY 6 HOURS PRN
Status: DISCONTINUED | OUTPATIENT
Start: 2025-07-14 | End: 2025-07-17 | Stop reason: HOSPADM

## 2025-07-14 RX ORDER — ACETAMINOPHEN 325 MG/1
650 TABLET ORAL EVERY 6 HOURS PRN
Status: DISCONTINUED | OUTPATIENT
Start: 2025-07-14 | End: 2025-07-17 | Stop reason: HOSPADM

## 2025-07-14 RX ORDER — LEVALBUTEROL INHALATION SOLUTION 1.25 MG/3ML
1.25 SOLUTION RESPIRATORY (INHALATION)
Status: DISCONTINUED | OUTPATIENT
Start: 2025-07-14 | End: 2025-07-14

## 2025-07-14 RX ORDER — BUDESONIDE 0.5 MG/2ML
0.5 INHALANT ORAL
Status: DISCONTINUED | OUTPATIENT
Start: 2025-07-14 | End: 2025-07-17 | Stop reason: HOSPADM

## 2025-07-14 RX ORDER — ALBUTEROL SULFATE 0.83 MG/ML
SOLUTION RESPIRATORY (INHALATION)
Status: COMPLETED
Start: 2025-07-14 | End: 2025-07-14

## 2025-07-14 RX ORDER — LEVALBUTEROL INHALATION SOLUTION 1.25 MG/3ML
1.25 SOLUTION RESPIRATORY (INHALATION) EVERY 6 HOURS PRN
Status: DISCONTINUED | OUTPATIENT
Start: 2025-07-14 | End: 2025-07-17 | Stop reason: HOSPADM

## 2025-07-14 RX ADMIN — MONTELUKAST 10 MG: 10 TABLET, FILM COATED ORAL at 03:10

## 2025-07-14 RX ADMIN — ACETAMINOPHEN 650 MG: 325 TABLET ORAL at 21:48

## 2025-07-14 RX ADMIN — ALBUTEROL SULFATE 2.5 MG: 0.83 SOLUTION RESPIRATORY (INHALATION) at 01:21

## 2025-07-14 RX ADMIN — FORMOTEROL FUMARATE 20 MCG: 20 SOLUTION RESPIRATORY (INHALATION) at 07:09

## 2025-07-14 RX ADMIN — TRAZODONE HYDROCHLORIDE 150 MG: 150 TABLET ORAL at 03:10

## 2025-07-14 RX ADMIN — ACETAMINOPHEN 650 MG: 325 TABLET ORAL at 03:10

## 2025-07-14 RX ADMIN — INSULIN LISPRO 10 UNITS: 100 INJECTION, SOLUTION INTRAVENOUS; SUBCUTANEOUS at 16:55

## 2025-07-14 RX ADMIN — IPRATROPIUM BROMIDE 0.5 MG: 0.5 SOLUTION RESPIRATORY (INHALATION) at 19:08

## 2025-07-14 RX ADMIN — INSULIN LISPRO 10 UNITS: 100 INJECTION, SOLUTION INTRAVENOUS; SUBCUTANEOUS at 21:48

## 2025-07-14 RX ADMIN — TRAZODONE HYDROCHLORIDE 150 MG: 150 TABLET ORAL at 21:48

## 2025-07-14 RX ADMIN — METHYLPREDNISOLONE SODIUM SUCCINATE 40 MG: 40 INJECTION, POWDER, FOR SOLUTION INTRAMUSCULAR; INTRAVENOUS at 14:48

## 2025-07-14 RX ADMIN — INSULIN LISPRO 12 UNITS: 100 INJECTION, SOLUTION INTRAVENOUS; SUBCUTANEOUS at 12:03

## 2025-07-14 RX ADMIN — INSULIN LISPRO 20 UNITS: 100 INJECTION, SOLUTION INTRAVENOUS; SUBCUTANEOUS at 09:13

## 2025-07-14 RX ADMIN — FORMOTEROL FUMARATE 20 MCG: 20 SOLUTION RESPIRATORY (INHALATION) at 19:08

## 2025-07-14 RX ADMIN — APIXABAN 5 MG: 5 TABLET, FILM COATED ORAL at 09:12

## 2025-07-14 RX ADMIN — INSULIN LISPRO 20 UNITS: 100 INJECTION, SOLUTION INTRAVENOUS; SUBCUTANEOUS at 16:56

## 2025-07-14 RX ADMIN — APIXABAN 5 MG: 5 TABLET, FILM COATED ORAL at 17:24

## 2025-07-14 RX ADMIN — METHYLPREDNISOLONE SODIUM SUCCINATE 40 MG: 40 INJECTION, POWDER, FOR SOLUTION INTRAMUSCULAR; INTRAVENOUS at 09:20

## 2025-07-14 RX ADMIN — PANTOPRAZOLE SODIUM 40 MG: 40 TABLET, DELAYED RELEASE ORAL at 09:19

## 2025-07-14 RX ADMIN — BUDESONIDE 0.5 MG: 0.5 INHALANT RESPIRATORY (INHALATION) at 19:08

## 2025-07-14 RX ADMIN — IPRATROPIUM BROMIDE 0.5 MG: 0.5 SOLUTION RESPIRATORY (INHALATION) at 13:19

## 2025-07-14 RX ADMIN — METHYLPREDNISOLONE SODIUM SUCCINATE 40 MG: 40 INJECTION, POWDER, FOR SOLUTION INTRAMUSCULAR; INTRAVENOUS at 21:48

## 2025-07-14 RX ADMIN — DULOXETINE 60 MG: 30 CAPSULE, DELAYED RELEASE ORAL at 09:11

## 2025-07-14 RX ADMIN — ATORVASTATIN CALCIUM 40 MG: 40 TABLET, FILM COATED ORAL at 09:11

## 2025-07-14 RX ADMIN — LEVALBUTEROL HYDROCHLORIDE 1.25 MG: 1.25 SOLUTION RESPIRATORY (INHALATION) at 13:19

## 2025-07-14 RX ADMIN — CEFTRIAXONE 1000 MG: 1 INJECTION, SOLUTION INTRAVENOUS at 09:37

## 2025-07-14 RX ADMIN — ALBUTEROL SULFATE 2.5 MG: 2.5 SOLUTION RESPIRATORY (INHALATION) at 01:21

## 2025-07-14 RX ADMIN — INSULIN LISPRO 20 UNITS: 100 INJECTION, SOLUTION INTRAVENOUS; SUBCUTANEOUS at 12:03

## 2025-07-14 RX ADMIN — BUDESONIDE 0.5 MG: 0.5 INHALANT RESPIRATORY (INHALATION) at 07:09

## 2025-07-14 RX ADMIN — LEVALBUTEROL HYDROCHLORIDE 1.25 MG: 1.25 SOLUTION RESPIRATORY (INHALATION) at 19:08

## 2025-07-14 RX ADMIN — INSULIN LISPRO 10 UNITS: 100 INJECTION, SOLUTION INTRAVENOUS; SUBCUTANEOUS at 09:12

## 2025-07-14 RX ADMIN — PREGABALIN 150 MG: 100 CAPSULE ORAL at 09:11

## 2025-07-14 RX ADMIN — MONTELUKAST 10 MG: 10 TABLET, FILM COATED ORAL at 21:48

## 2025-07-14 RX ADMIN — INSULIN GLARGINE 40 UNITS: 100 INJECTION, SOLUTION SUBCUTANEOUS at 21:48

## 2025-07-14 NOTE — PLAN OF CARE
Problem: PAIN - ADULT  Goal: Verbalizes/displays adequate comfort level or baseline comfort level  Description: Interventions:  - Encourage patient to monitor pain and request assistance  - Assess pain using appropriate pain scale  - Administer analgesics as ordered based on type and severity of pain and evaluate response  - Implement non-pharmacological measures as appropriate and evaluate response  - Consider cultural and social influences on pain and pain management  - Notify physician/advanced practitioner if interventions unsuccessful or patient reports new pain  - Educate patient/family on pain management process including their role and importance of  reporting pain   - Provide non-pharmacologic/complimentary pain relief interventions  Outcome: Progressing     Problem: SAFETY ADULT  Goal: Patient will remain free of falls  Description: INTERVENTIONS:  - Educate patient/family on patient safety including physical limitations  - Instruct patient to call for assistance with activity   - Consider consulting OT/PT to assist with strengthening/mobility based on AM PAC & JH-HLM score  - Consult OT/PT to assist with strengthening/mobility   - Keep Call bell within reach  - Keep bed low and locked with side rails adjusted as appropriate  - Keep care items and personal belongings within reach  - Initiate and maintain comfort rounds  - Make Fall Risk Sign visible to staff  - Offer Toileting every 2 Hours, in advance of need  - Initiate/Maintain bed alarm  - Obtain necessary fall risk management equipment:    - Apply yellow socks and bracelet for high fall risk patients  - Consider moving patient to room near nurses station  Outcome: Progressing     Problem: RESPIRATORY - ADULT  Goal: Achieves optimal ventilation and oxygenation  Description: INTERVENTIONS:  - Assess for changes in respiratory status  - Assess for changes in mentation and behavior  - Position to facilitate oxygenation and minimize respiratory effort  -  Oxygen administered by appropriate delivery if ordered  - Initiate smoking cessation education as indicated  - Encourage broncho-pulmonary hygiene including cough, deep breathe, Incentive Spirometry  - Assess the need for suctioning and aspirate as needed  - Assess and instruct to report SOB or any respiratory difficulty  - Respiratory Therapy support as indicated  Outcome: Progressing

## 2025-07-14 NOTE — CONSULTS
Consultation - Pulmonology   Name: Mattie Joy 64 y.o. female I MRN: [id number]  Unit/Bed#: -01 I Date of Admission: 7/13/2025   Date of Service: 7/14/2025 I Hospital Day: 0   Inpatient consult to Pulmonology  Consult performed by: Itzel Antonio DO  Consult ordered by: Ko Tovar PA-C        Physician Requesting Evaluation: Luis Alberto Garcia MD   Reason for Evaluation / Principal Problem: COPD exacerbation    Assessment & Plan  Acute exacerbation of asthma/COPD overlap syndrome  Exacerbation is related to her not being able to use any medications for the past month.  She states she was too tired to even use nebulizers.    Plan:  Continue every 8 hour Solu-Medrol  Use all nebulized regimen of Pulmicort/Perforomist twice daily  I added Xopenex/Atrovent every 6 hours    With procalcitonin negative x 2, and no CT evidence of pneumonia, will discontinue ceftriaxone    At time of discharge would likely benefit from outpatient pulmonary rehab if she does not require inpatient rehab.  Acute on chronic respiratory failure with hypoxia and hypercapnia (HCC)  Continue supplemental oxygen to maintain sats greater than 88%  Continue nocturnal BiPAP  Pulmonary hypertension (HCC)  Resume home diuretics 7/15      History of Present Illness   Mattie Joy is a 64 y.o. female who presents with gradual increased worsening of her baseline dyspnea over the past month.  She got to the point where she was unable to even walk to the bathroom without having significant shortness of breath.  She reported chest tightness and a dry cough, but denied any audible wheeze.  At baseline she has asthma/COPD.  She is supposed to be on an all nebulized regimen, however she states over the past month she was so tired she just could not even convince herself to do nebulizer treatments.  She has some inhalers at home, but she states she was not using those either.  She is currently in the workup for endobronchial valves.  She had her  breathing test but did not complete pulmonary rehab because she was too tired to go there.    Review of Systems    Historical Information   Historical Information   Past Medical History[1]  Past Surgical History[2]  Social History[3]  E-Cigarette/Vaping    E-Cigarette Use Never User      E-Cigarette/Vaping Substances    Nicotine No     THC No     CBD No     Flavoring No     Other No     Unknown No      Family History[4]      Objective :  Temp:  [97 °F (36.1 °C)-98.7 °F (37.1 °C)] 97 °F (36.1 °C)  HR:  [] 96  BP: (102-142)/(51-64) 109/56  Resp:  [19-37] 24  SpO2:  [86 %-99 %] 93 %  O2 Device: Nasal cannula  Nasal Cannula O2 Flow Rate (L/min):  [4 L/min-5 L/min] 4 L/min    Physical Exam    General:  Patient is awake, alert, non-toxic and in no acute respiratory distress  Eyes: no scleral icterus  CV:  Regular, +S1 and S2, No murmurs, gallops or rubs appreciated  Lungs: Greatly diminished breath sounds in all lung fields without wheeze, rales or rhonchi  Abdomen: Soft, +BS, Non-tender, non-distended  Extremities: Peripheral edema  Neuro: No focal motor/sensory deficits  Skin: Warm, No rashes       Lab Results: I have reviewed the following results:  .     07/13/25  1841 07/13/25  1901 07/13/25  2100 07/14/25  0811   WBC 13.72*  --   --  13.88*   HGB 9.0*  --   --  8.4*   HCT 33.5*  --   --  30.6*   *  --   --  350   SODIUM 134*  --   --  132*   K 4.2  --   --  4.7   CL 95*  --   --  92*   CO2 33*  --   --  31   BUN 16  --   --  21   CREATININE 1.45*  --   --  0.82   GLUC 196*  --   --  320*   AST 21  --   --   --    ALT 16  --   --   --    ALB 4.4  --   --   --    TBILI 0.33  --   --   --    ALKPHOS 114*  --   --   --    HSTNI0 4  --   --   --    HSTNI2  --   --  4  --    *  --   --   --    LACTICACID  --    < >  --  1.7    < > = values in this interval not displayed.     ABG: No new results in last 24 hours.    Imaging Results Review: I personally reviewed the following image studies in PACS and  associated radiology reports: CT chest. My interpretation of the radiology images/reports is: Mosaic attenuation consistent with air trapping, enlarged pulmonary artery.    PFT Results Reviewed: reviewed  Severe obstructive airflow defect on spirometry.   No post bronchodilator response.  Air trapping and hyperinflation as indicated by the lung volumes.  Diffusing capacity unable to be tested adequately.  Flow-volume loop consistent with obstruction    VTE Prophylaxis: Eliquis           [1]   Past Medical History:  Diagnosis Date    Acute blood loss anemia 06/01/2021    Acute diverticulitis 05/02/2021    Acute on chronic diastolic congestive heart failure (HCC) 05/21/2020    Acute on chronic respiratory failure with hypoxia ([facility]) 11/30/2018    Alcohol abuse 05/21/2020    Anemia     Asthma with COPD with exacerbation ([facility]) 11/12/2020    Atrial fibrillation ([facility])     Cervical radiculopathy     CHF (congestive heart failure) ([facility])     Chronic low back pain     Chronic obstructive asthma ([facility]) 02/20/2018    Cigarette nicotine dependence without complication 11/20/2019    Quit 12/10/2019.     Community acquired pneumonia 05/21/2020    COPD exacerbation ([facility]) 09/16/2020    Depression with anxiety 03/09/2014    Diabetes mellitus with hyperglycemia ([facility])     Diverticulitis 06/06/2021    Elevated liver enzymes     GERD (gastroesophageal reflux disease)     Hypersomnolence 10/28/2020    Hypokalemia 12/04/2018    Lower gastrointestinal bleeding 06/01/2021    Paresthesia of upper extremity     Plantar fasciitis     Restless legs syndrome 04/08/2014    Severe persistent asthma with exacerbation 02/20/2018    PFTs February 8, 2018:  FEV1 0.87 L-35% predicted, 27% improvement post-bronchodilator.  DLCO-82% predicted,  The patient has been having worsening of her symptoms now for few months, especially  from January 2020, also she had multiple exacerbations last year and most recently required a steroid burst and August  Reviewing  her CT scan  New PFTs with severe obstruction reviewed  Hypersensitivity p    Sexual dysfunction     Sleep apnea, obstructive     Tenosynovitis of finger     Tinea corporis     Tobacco use 2018    Currently smoking 3-4 cigarettes daily    Trigger middle finger of left hand 2017    Trigger ring finger of left hand 2017    Weakness of upper extremity    [2]   Past Surgical History:  Procedure Laterality Date    ABDOMINAL SURGERY      CARPAL TUNNEL RELEASE Bilateral      SECTION      CHOLECYSTECTOMY      laparoscopic    ESOPHAGOGASTRODUODENOSCOPY N/A 12/3/2018    Procedure: ESOPHAGOGASTRODUODENOSCOPY (EGD) AND COLONOSCOPY;  Surgeon: Ludmila Perry MD;  Location: [facility];  Service: Gastroenterology    GASTRIC BYPASS      for morbid obesity with gilda en y    HERNIA REPAIR      HYSTERECTOMY      VA EXCISION GANGLION WRIST DORSAL/VOLAR PRIMARY Left 2017    Procedure: LONG FINGER GANGLION CYST EXCISION;  Surgeon: Philippe Clark MD;  Location: [facility];  Service: Orthopedics    VA TENDON SHEATH INCISION Left 2017    Procedure: THUMB, LONG, RING, TRIGGER FINGER RELEASE;  Surgeon: Philippe Clark MD;  Location: [facility];  Service: Orthopedics    SHOULDER SURGERY Right    [3]   Social History  Tobacco Use    Smoking status: Former     Current packs/day: 0.00     Average packs/day: 0.3 packs/day for 29.9 years (7.5 ttl pk-yrs)     Types: Cigarettes     Start date:      Quit date: 12/10/2019     Years since quittin.5    Smokeless tobacco: Never   Vaping Use    Vaping status: Never Used   Substance and Sexual Activity    Alcohol use: Not Currently     Alcohol/week: 20.0 standard drinks of alcohol     Types: 20 Cans of beer per week     Comment: about 6 coors light every night, stopped in 2019    Drug use: No    Sexual activity: Not Currently   [4]   Family History  Problem Relation Name Age of Onset    Alzheimer's disease Mother      Atrial fibrillation Mother       Dementia Mother      Heart disease Mother          heart problem    Seizures Mother      Parkinsonism Father      Arthritis Father      Atrial fibrillation Father      No Known Problems Daughter mark     Cri-du-chat syndrome Daughter peña     Colon cancer Maternal Grandmother  80    Diabetes type II Maternal Grandmother      No Known Problems Brother      No Known Problems Son digna     Substance Abuse Neg Hx          mother,father    Mental illness Neg Hx          mother,father    Colon polyps Neg Hx

## 2025-07-14 NOTE — H&P
"H&P - Hospitalist   Name: Mattie Joy 64 y.o. female I MRN: [id number]  Unit/Bed#: ED 10 I Date of Admission: 7/13/2025   Date of Service: 7/14/2025 I Hospital Day: 0     Assessment & Plan  Asthma-COPD overlap syndrome with flare (HCC)  Chief complaint of worsening shortness of breath, weakness.  Was not using her nebulizers at home as previously ordered.  Found to be in COPD flare, hypoxic in the ER requiring 5 L/min.  Resume Pulmicort, Perforomist  Resume Singulair  Xopenex/Atrovent as needed  Start IV Solu-Medrol 40 mg every 8 hours  Pulmonary consult  IVELISSE (acute kidney injury) (HCC)  Mild IVELISSE with creatinine 1.4 on admission, baseline around 0.7  Hold nephrotoxins, spironolactone, Bumex  Hold IV fluids in the setting of diastolic heart failure  Repeat BMP tomorrow  Type 2 diabetes mellitus with stage 2 chronic kidney disease, with long-term current use of insulin (HCC)  Lab Results   Component Value Date    HGBA1C 8.8 (A) 04/22/2025       No results for input(s): \"POCGLU\" in the last 72 hours.    Blood Sugar Average: Last 72 hrs:  Resume home Lantus 40 units nightly  Resume Humalog 20 units daily with meals  Sliding scale coverage while hospitalized  Chronic respiratory failure with hypoxia and hypercapnia (HCC)  Chronically maintained on 4 L/min of oxygen  See plan above, wean as tolerated  Chronic diastolic heart failure (HCC)  Wt Readings from Last 3 Encounters:   07/14/25 123 kg (271 lb)   06/03/25 122 kg (268 lb)   05/20/25 122 kg (268 lb 12.8 oz)   EF 65%, grade 2 diastolic dysfunction  Hold Bumex and Aldactone in the setting of IVELISSE  Monitor volume status  Longstanding persistent atrial fibrillation (HCC)  Resume home Eliquis  Continue Toprol-XL and Cardizem  Pulmonary hypertension (HCC)  Noted history, follows with pulmonary medicine    VTE Pharmacologic Prophylaxis: VTE Score: 5 High Risk (Score >/= 5) - Pharmacological DVT Prophylaxis Ordered: apixaban (Eliquis). Sequential Compression Devices " Ordered.  Code Status: full code    Anticipated Length of Stay: Patient will be admitted on an inpatient basis with an anticipated length of stay of greater than 2 midnights secondary to COPD with acute flare with hypoxia requiring pulmonary consult, steroids, bronchodilators.    Total Time for Visit, including Counseling / Coordination of Care: 75 Minutes. Greater than 50% of this total time spent on direct patient counseling and coordination of care.    Chief Complaint: Shortness of breath    History of Present Illness:  Mattie Joy is a 64 y.o. female with a PMH of asthma/COPD, chronic respiratory failure on 4 L/min of oxygen, type 2 diabetes, diastolic heart failure who presents with shortness of breath.  Reports worsening shortness of breath and generalized weakness over the past day or so.  Has not been compliant with her bronchodilator regimen at home.  Requiring 5 L/min of oxygen upon arrival, typically requires 4 L/min.  Follows with outpatient pulmonary medicine.  Found to be in acute COPD flare requiring IV steroids and admission to hospital.    Review of Systems:  Review of Systems   Constitutional:  Negative for activity change, appetite change, chills, fatigue and fever.   HENT:  Negative for congestion, rhinorrhea, sinus pressure and sore throat.    Eyes:  Negative for photophobia, pain and visual disturbance.   Respiratory:  Positive for shortness of breath and wheezing. Negative for cough.    Cardiovascular:  Negative for chest pain, palpitations and leg swelling.   Gastrointestinal:  Negative for abdominal distention, abdominal pain, constipation, diarrhea, nausea and vomiting.   Endocrine: Negative for cold intolerance, heat intolerance, polydipsia and polyuria.   Genitourinary:  Negative for difficulty urinating, dysuria, flank pain, frequency and hematuria.   Musculoskeletal:  Negative for arthralgias, back pain and joint swelling.   Skin:  Negative for color change, pallor and rash.    Allergic/Immunologic: Negative.    Neurological:  Negative for dizziness, syncope, weakness, light-headedness and headaches.   Hematological: Negative.    Psychiatric/Behavioral: Negative.         Past Medical and Surgical History:   Past Medical History[1]    Past Surgical History[2]    Meds/Allergies:  Prior to Admission medications    Medication Sig Start Date End Date Taking? Authorizing Provider   acetaminophen (TYLENOL) 650 mg CR tablet Take 650 mg by mouth every 8 (eight) hours as needed for mild pain    Historical Provider, MD   albuterol (2.5 mg/3 mL) 0.083 % nebulizer solution Take 3 mL (2.5 mg total) by nebulization every 6 (six) hours as needed for wheezing or shortness of breath 6/8/23   Tin Brennan MD   albuterol (PROVENTIL HFA,VENTOLIN HFA) 90 mcg/act inhaler Inhale 2 puffs every 4 (four) hours as needed for wheezing 5/21/24   Tin Brennan MD   apixaban (Eliquis) 5 mg Take 1 tablet (5 mg total) by mouth 2 (two) times a day 7/29/24   Jovanni Pacheco MD   arformoterol (BROVANA) 15 mcg/2 mL nebulizer solution Take 2 mL (15 mcg total) by nebulization 2 (two) times a day 4/9/25   Tin Brennan MD   atorvastatin (LIPITOR) 40 mg tablet Take 1 tablet (40 mg total) by mouth daily 1/9/25   Jovanni Pacheco MD   Blood Glucose Monitoring Suppl (Contour Next One) AILYN USE AS DIRECTED 3 TIMES A DAY 10/30/23   Laith Olivares MD   budesonide (Pulmicort) 1 MG/2ML nebulizer solution Take 2 mL (1 mg total) by nebulization daily Rinse mouth after use.  Patient not taking: Reported on 5/20/2025 4/9/25   Tin Brennan MD   bumetanide (BUMEX) 2 mg tablet Take 3 tablets (6 mg total) by mouth 2 (two) times a day 8/30/24   Jovanni Pacheco MD   Contour Next Test test strip USE TO TEST BLOOD SUGAR 3 TIMES A DAY  Patient not taking: Reported on 4/9/2025 10/29/23   Laith Olivares MD   [employer] Lancets Thin 26G MISC USE 3 (THREE) TIMES A DAY  Patient not taking: Reported on 4/9/2025 5/4/22    Laith Olivares MD   dapagliflozin (Farxiga) 10 MG tablet Take 1 tablet (10 mg total) by mouth daily 7/29/24   Jovanni Pacheoc MD   diltiazem (CARDIZEM CD) 120 mg 24 hr capsule Take 1 capsule (120 mg total) by mouth daily 7/29/24   Jovanni Pacheco MD   DULoxetine (CYMBALTA) 60 mg delayed release capsule TAKE ONE CAPSULE BY MOUTH DAILY 5/19/25   Laith Olivares MD   Easy Comfort Pen Needles 33G X 4 MM MISC Use 4 times a day 1/9/25   Petrona Taveras MD   EPINEPHrine (EPIPEN) 0.3 mg/0.3 mL SOAJ Inject 0.3 mL (0.3 mg total) into a muscle once for 1 dose 1/24/25 7/3/25  VICKY Dhaliwal   Ferrous Sulfate 300 MG/6.8ML SOLN Take 6.8 mL by mouth 2 (two) times a day  Patient not taking: Reported on 4/9/2025 9/16/24   Laith Olivares MD   fluticasone (FLONASE) 50 mcg/act nasal spray 1 spray into each nostril 2 (two) times a day  Patient not taking: Reported on 4/9/2025 4/11/24   VICKY Zacarias   Insulin Glargine Solostar (Lantus SoloStar) 100 UNIT/ML SOPN Inject 40 units daily at bedtime 1/9/25   Petrona Taveras MD   insulin lispro (HumaLOG KwikPen) 100 units/mL injection pen INJECT 25 SUBCUTANEOUSLY IN THE MORNING, AND 20 UNITS AT LUNCH AND 20 UNITS AT SUPPER 6/17/25   Petrona Taveras MD   Insulin Pen Needle (BD Pen Needle Love 2nd Gen) 32G X 4 MM MISC Use daily at bedtime Use 4 new needles daily . 12/6/23   Lobito Alfredo PA-C   levalbuterol (XOPENEX) 1.25 mg/0.5 mL nebulizer solution Take 0.5 mL (1.25 mg total) by nebulization 2 (two) times a day  Patient not taking: Reported on 4/9/2025 11/16/20   Irma Friedman,    loratadine (CLARITIN) 10 mg tablet Take 1 tablet (10 mg total) by mouth daily as needed for allergies 2/9/24   Hawa Marsh MD   metoprolol succinate (TOPROL-XL) 100 mg 24 hr tablet Take 1 tablet (100 mg total) by mouth daily 2/21/25   Jovanni Pacheco MD   montelukast (SINGULAIR) 10 mg tablet TAKE ONE TABLET BY MOUTH AT BEDTIME 3/25/25   Laith Olivares MD   naloxone  (NARCAN) 4 mg/0.1 mL nasal spray Administer 1 spray into a nostril. If breathing does not return to normal or if breathing difficulty resumes after 2-3 minutes, give another dose in the other nostril using a new spray. 7/29/20   Laith Olivares MD   nystatin (MYCOSTATIN) powder Apply topically 2 (two) times a day  Patient not taking: Reported on 4/9/2025 2/9/24   Hawa Marsh MD   omeprazole (PriLOSEC) 40 MG capsule Take 1 capsule (40 mg total) by mouth daily 2/21/25   Laith Olivares MD   pregabalin (LYRICA) 150 mg capsule Take 1 capsule by mouth in the morning 4/9/25   Historical Provider, MD   Revefenacin (Yupelri) 175 MCG/3ML SOLN Take 3 mL (175 mcg total) by nebulization in the morning 4/9/25   Tin Brennan MD   spironolactone (ALDACTONE) 50 mg tablet Take 2 tablets (100 mg total) by mouth daily 6/6/25   Jovanni Pacheco MD   Tirzepatide (Mounjaro) 7.5 MG/0.5ML SOAJ Inject 7.5 mg under the skin once a week 4/22/25   Lobito Alfredo PA-C   traZODone (DESYREL) 150 mg tablet TAKE ONE TABLET BY MOUTH AT BEDTIME 5/19/25   Laith Olivares MD   Vitamin D, Cholecalciferol, 50 MCG (2000 UT) CAPS Take 2,000 Int'l Units/day by mouth in the morning 5/15/24   Laith Olivares MD       All medications reviewed.    Allergies: Allergies[3]    Social History:  Marital Status: Significant Other     Substance Use History:   Social History     Substance and Sexual Activity   Alcohol Use Not Currently    Alcohol/week: 20.0 standard drinks of alcohol    Types: 20 Cans of beer per week    Comment: about 6 coors light every night, stopped in 2019     Tobacco Use History[4]  Social History     Substance and Sexual Activity   Drug Use No       Family History:  Non-contributory    Physical Exam:     Vitals:   Blood Pressure: 118/59 (07/14/25 0109)  Pulse: (!) 111 (07/14/25 0109)  Temperature: 97.8 °F (36.6 °C) (07/14/25 0109)  Temp Source: Oral (07/14/25 0109)  Respirations: 20 (07/14/25 0109)  SpO2: 97 %  (07/14/25 0109)    Physical Exam  Vitals and nursing note reviewed.   Constitutional:       General: She is not in acute distress.     Appearance: Normal appearance. She is ill-appearing.      Interventions: Nasal cannula in place.   HENT:      Head: Normocephalic and atraumatic.      Mouth/Throat:      Mouth: Mucous membranes are moist.     Eyes:      General: No scleral icterus.        Right eye: No discharge.         Left eye: No discharge.      Pupils: Pupils are equal, round, and reactive to light.       Cardiovascular:      Rate and Rhythm: Normal rate and regular rhythm.      Heart sounds: No murmur heard.     No friction rub. No gallop.   Pulmonary:      Effort: No respiratory distress.      Breath sounds: Wheezing present. No rhonchi or rales.   Abdominal:      General: Bowel sounds are normal. There is no distension.      Palpations: Abdomen is soft.      Tenderness: There is no abdominal tenderness. There is no guarding or rebound.     Musculoskeletal:         General: No swelling or deformity.      Cervical back: Neck supple.      Right lower leg: No edema.      Left lower leg: No edema.     Skin:     General: Skin is warm and dry.      Capillary Refill: Capillary refill takes less than 2 seconds.      Coloration: Skin is not jaundiced or pale.      Findings: No erythema or rash.     Neurological:      General: No focal deficit present.      Mental Status: She is alert and oriented to person, place, and time. Mental status is at baseline.      Cranial Nerves: No cranial nerve deficit.      Sensory: No sensory deficit.     Psychiatric:         Mood and Affect: Mood normal.         Behavior: Behavior normal.         Additional Data:     Lab Results:  Results from last 7 days   Lab Units 07/13/25  1841   WBC Thousand/uL 13.72*   HEMOGLOBIN g/dL 9.0*   HEMATOCRIT % 33.5*   PLATELETS Thousands/uL 398*   SEGS PCT % 76*   LYMPHO PCT % 15   MONO PCT % 8   EOS PCT % 0     Results from last 7 days   Lab Units  07/13/25  1841   SODIUM mmol/L 134*   POTASSIUM mmol/L 4.2   CHLORIDE mmol/L 95*   CO2 mmol/L 33*   BUN mg/dL 16   CREATININE mg/dL 1.45*   ANION GAP mmol/L 6   CALCIUM mg/dL 8.8   ALBUMIN g/dL 4.4   TOTAL BILIRUBIN mg/dL 0.33   ALK PHOS U/L 114*   ALT U/L 16   AST U/L 21   GLUCOSE RANDOM mg/dL 196*                 Results from last 7 days   Lab Units 07/13/25  1901 07/13/25  1841   LACTIC ACID mmol/L 1.5  --    PROCALCITONIN ng/ml  --  0.09       Imaging: All pertinent imaging reviewed.  CTA chest pe study   Final Result by Esequiel Qureshi DO (07/14 0019)      No pulmonary embolus.   Main pulmonary arterial enlargement suggestive of pulmonary arterial hypertension.   Mosaic attenuation pattern of the lungs redemonstrated again suggestive of air trapping.                  Computerized Assisted Algorithm (CAA) may have aided analysis of applicable images.      Workstation performed: [id number]         X-ray chest 2 views    (Results Pending)       EKG and Other Studies Reviewed on Admission:   Prior pertinent studies and records reviewed in [facility] Everywhere.    ** Please Note: This note has been constructed using a voice recognition system. **         [1]   Past Medical History:  Diagnosis Date    Acute blood loss anemia 06/01/2021    Acute diverticulitis 05/02/2021    Acute on chronic diastolic congestive heart failure (HCC) 05/21/2020    Acute on chronic respiratory failure with hypoxia ([facility]) 11/30/2018    Alcohol abuse 05/21/2020    Anemia     Asthma with COPD with exacerbation ([facility]) 11/12/2020    Atrial fibrillation ([facility])     Cervical radiculopathy     CHF (congestive heart failure) ([facility])     Chronic low back pain     Chronic obstructive asthma ([facility]) 02/20/2018    Cigarette nicotine dependence without complication 11/20/2019    Quit 12/10/2019.     Community acquired pneumonia 05/21/2020    COPD exacerbation ([facility]) 09/16/2020    Depression with anxiety 03/09/2014    Diabetes mellitus with hyperglycemia ([facility])      Diverticulitis 2021    Elevated liver enzymes     GERD (gastroesophageal reflux disease)     Hypersomnolence 10/28/2020    Hypokalemia 2018    Lower gastrointestinal bleeding 2021    Paresthesia of upper extremity     Plantar fasciitis     Restless legs syndrome 2014    Severe persistent asthma with exacerbation 2018    PFTs 2018:  FEV1 0.87 L-35% predicted, 27% improvement post-bronchodilator.  DLCO-82% predicted,  The patient has been having worsening of her symptoms now for few months, especially  from 2020, also she had multiple exacerbations last year and most recently required a steroid burst and August  Reviewing her CT scan  New PFTs with severe obstruction reviewed  Hypersensitivity p    Sexual dysfunction     Sleep apnea, obstructive     Tenosynovitis of finger     Tinea corporis     Tobacco use 2018    Currently smoking 3-4 cigarettes daily    Trigger middle finger of left hand 2017    Trigger ring finger of left hand 2017    Weakness of upper extremity    [2]   Past Surgical History:  Procedure Laterality Date    ABDOMINAL SURGERY      CARPAL TUNNEL RELEASE Bilateral      SECTION      CHOLECYSTECTOMY      laparoscopic    ESOPHAGOGASTRODUODENOSCOPY N/A 12/3/2018    Procedure: ESOPHAGOGASTRODUODENOSCOPY (EGD) AND COLONOSCOPY;  Surgeon: Ludmila Perry MD;  Location: [facility];  Service: Gastroenterology    GASTRIC BYPASS      for morbid obesity with gilda en y    HERNIA REPAIR      HYSTERECTOMY      NH EXCISION GANGLION WRIST DORSAL/VOLAR PRIMARY Left 2017    Procedure: LONG FINGER GANGLION CYST EXCISION;  Surgeon: Philippe Clark MD;  Location: [facility];  Service: Orthopedics    NH TENDON SHEATH INCISION Left 2017    Procedure: THUMB, LONG, RING, TRIGGER FINGER RELEASE;  Surgeon: Philippe Clark MD;  Location: [facility];  Service: Orthopedics    SHOULDER SURGERY Right    [3]   Allergies  Allergen Reactions     Iron Dextran Swelling    Januvia [Sitagliptin] Shortness Of Breath    Jardiance [Empagliflozin] Shortness Of Breath    Clonazepam Other (See Comments)     Unknown reaction    Codeine Itching and Other (See Comments)     itch;mary kay morphine,takes ultram @home    Adhesive [Medical Tape] Itching     itching    Effexor [Venlafaxine] Itching    Lactose - Food Allergy GI Intolerance    Oxycodone-Acetaminophen Anxiety    Oxycodone-Acetaminophen Itching and Rash     Other reaction(s): Other (See Comments)  (PERCOCET) MILD RASH, not allergic to Acetaminophen    [4]   Social History  Tobacco Use   Smoking Status Former    Current packs/day: 0.00    Average packs/day: 0.3 packs/day for 29.9 years (7.5 ttl pk-yrs)    Types: Cigarettes    Start date:     Quit date: 12/10/2019    Years since quittin.5   Smokeless Tobacco Never

## 2025-07-14 NOTE — RESPIRATORY THERAPY NOTE
"RT Protocol Note  Mattie Joy 64 y.o. female MRN: [id number]  Unit/Bed#: -01 Encounter: [id number]    Assessment    Principal Problem:    Acute exacerbation of asthma/COPD overlap syndrome  Active Problems:    Type 2 diabetes mellitus with stage 2 chronic kidney disease, with long-term current use of insulin (HCC)    Longstanding persistent atrial fibrillation (HCC)    Sepsis (HCC)    Acute on chronic respiratory failure with hypoxia and hypercapnia (HCC)    Pulmonary hypertension (HCC)    IVELISSE (acute kidney injury) (HCC)    Chronic diastolic heart failure (HCC)      Home Pulmonary Medications:  Albuterol inhaler/nebs PRN  Home Devices/Therapy: Home O2, BiPAP/CPAP    Past Medical History[1]  Social History[2]    Subjective         Objective    Physical Exam:   Assessment Type: Assess only  General Appearance: Alert, Awake  Respiratory Pattern: Dyspnea with exertion  Chest Assessment: Chest expansion symmetrical  Bilateral Breath Sounds: Diminished  Cough: Non-productive  O2 Device: NC 4    Vitals:  Blood pressure 112/60, pulse (!) 107, temperature (!) 97 °F (36.1 °C), resp. rate (!) 24, height 5' 3\" (1.6 m), weight 123 kg (271 lb), SpO2 93%, not currently breastfeeding.          Imaging and other studies: Results Review Statement: No pertinent imaging studies reviewed.    O2 Device: NC 4     Plan    Respiratory Plan: Moderate/Severe Distress pathway, Home Bronchodilator Patient pathway, Vent/NIV/HFNC        Resp Comments: PT has HX of COPD/asthma overlap. PT states she is compliant with Home O2 (4L), as well as home BIPAP HS. PT takes albuterol neb/inhaler PRN. Plan to keep scheduled TX/place PT on BIPAP HS.        [1]   Past Medical History:  Diagnosis Date    Acute blood loss anemia 06/01/2021    Acute diverticulitis 05/02/2021    Acute on chronic diastolic congestive heart failure (HCC) 05/21/2020    Acute on chronic respiratory failure with hypoxia (HCC) 11/30/2018    Alcohol abuse 05/21/2020    Anemia     " Asthma with COPD with exacerbation (HCC) 2020    Atrial fibrillation (HCC)     Cervical radiculopathy     CHF (congestive heart failure) (HCC)     Chronic low back pain     Chronic obstructive asthma (HCC) 2018    Cigarette nicotine dependence without complication 2019    Quit 12/10/2019.     Community acquired pneumonia 2020    COPD exacerbation (HCC) 2020    Depression with anxiety 2014    Diabetes mellitus with hyperglycemia (HCC)     Diverticulitis 2021    Elevated liver enzymes     GERD (gastroesophageal reflux disease)     Hypersomnolence 10/28/2020    Hypokalemia 2018    Lower gastrointestinal bleeding 2021    Paresthesia of upper extremity     Plantar fasciitis     Restless legs syndrome 2014    Severe persistent asthma with exacerbation 2018    PFTs 2018:  FEV1 0.87 L-35% predicted, 27% improvement post-bronchodilator.  DLCO-82% predicted,  The patient has been having worsening of her symptoms now for few months, especially  from 2020, also she had multiple exacerbations last year and most recently required a steroid burst and August  Reviewing her CT scan  New PFTs with severe obstruction reviewed  Hypersensitivity p    Sexual dysfunction     Sleep apnea, obstructive     Tenosynovitis of finger     Tinea corporis     Tobacco use 2018    Currently smoking 3-4 cigarettes daily    Trigger middle finger of left hand 2017    Trigger ring finger of left hand 2017    Weakness of upper extremity    [2]   Social History  Socioeconomic History    Marital status: Significant Other   Tobacco Use    Smoking status: Former     Current packs/day: 0.00     Average packs/day: 0.3 packs/day for 29.9 years (7.5 ttl pk-yrs)     Types: Cigarettes     Start date:      Quit date: 12/10/2019     Years since quittin.5    Smokeless tobacco: Never   Vaping Use    Vaping status: Never Used   Substance and Sexual Activity     Alcohol use: Not Currently     Alcohol/week: 20.0 standard drinks of alcohol     Types: 20 Cans of beer per week     Comment: about 6 coors light every night, stopped in 2019    Drug use: No    Sexual activity: Not Currently     Social Drivers of Health     Food Insecurity: No Food Insecurity (4/29/2024)    Nursing - Inadequate Food Risk Classification     Worried About Running Out of Food in the Last Year: Never true     Ran Out of Food in the Last Year: Never true   Transportation Needs: No Transportation Needs (4/29/2024)    PRAPARE - Transportation     Lack of Transportation (Medical): No     Lack of Transportation (Non-Medical): No   Housing Stability: Unknown (4/29/2024)    Housing Stability Vital Sign     Unable to Pay for Housing in the Last Year: No     Homeless in the Last Year: No

## 2025-07-14 NOTE — SEPSIS NOTE
Sepsis Note   Mattie Joy 64 y.o. female MRN: [id number]  Unit/Bed#: -01 Encounter: [id number]       Initial Sepsis Screening       Row Name 07/14/25 0732                Is the patient's history suggestive of a new or worsening infection? Yes (Proceed)  -MS        Suspected source of infection --  COPD exacerbation  -MS                  User Key  (r) = Recorded By, (t) = Taken By, (c) = Cosigned By      Initials Name Provider Type    MS Luis Alberto Garcia MD Physician                   Initial Sepsis Screening       Row Name 07/14/25 0732                   Initial Sepsis Assessment    Is the patient's history suggestive of a new or worsening infection? Yes (Proceed)  -MS        Suspected source of infection --  COPD exacerbation  -MS                  User Key  (r) = Recorded By, (t) = Taken By, (c) = Cosigned By      Initials Name Provider Type    MS Luis Alberto Garcia MD Physician                         Body mass index is 48.01 kg/m².  Wt Readings from Last 1 Encounters:   07/14/25 123 kg (271 lb)     IBW (Ideal Body Weight): 52.4 kg    Ideal body weight: 52.4 kg (115 lb 8.3 oz)  Adjusted ideal body weight: 80.6 kg (177 lb 11.4 oz)

## 2025-07-14 NOTE — SEPSIS NOTE
Sepsis Note   Mattie Joy 64 y.o. female MRN: [id number]  Unit/Bed#: -01 Encounter: [id number]       Initial Sepsis Screening       Row Name 07/14/25 0732                Is the patient's history suggestive of a new or worsening infection? Yes (Proceed)  -MS        Suspected source of infection --  COPD exacerbation  -MS        Indicate SIRS criteria --        Are two or more of the above signs & symptoms of infection both present and new to the patient? --                  User Key  (r) = Recorded By, (t) = Taken By, (c) = Cosigned By      Initials Name Provider Type    MS Luis Alberto Garcia MD Physician                   Initial Sepsis Screening       Row Name 07/14/25 0734 07/14/25 0733 07/14/25 0732             Initial Sepsis Assessment    Is the patient's history suggestive of a new or worsening infection? Yes (Proceed)  -MS -- Yes (Proceed)  -MS      Suspected source of infection suspect infection, source unknown  -MS suspect infection, source unknown  -MS --  COPD exacerbation  -MS      Indicate SIRS criteria Tachypnea > 20 resp per min;Tachycardia > 90 bpm;Leukocytosis (WBC > 92889 IJL) OR Leukopenia (WBC <4000 IJL) OR Bandemia (WBC >10% bands)  -MS Tachypnea > 20 resp per min;Tachycardia > 90 bpm;Leukocytosis (WBC > 78319 IJL) OR Leukopenia (WBC <4000 IJL) OR Bandemia (WBC >10% bands)  -MS --      Are two or more of the above signs & symptoms of infection both present and new to the patient? Yes (Proceed)  -MS Yes (Proceed)  -MS --         If the answer is yes to both above questions, suspicion of sepsis is present. Proceed with algorithm to determine if severe sepsis or septic shock criteria are met.    Assess for evidence of organ dysfunction: Are any of the below criteria present within 6 hours of suspected infection and SIRS criteria that are NOT considered to be chronic conditions? -- -- --                User Key  (r) = Recorded By, (t) = Taken By, (c) = Cosigned By      Initials Name Provider  Type    MS Luis Alberto Garcia MD Physician                         Body mass index is 48.01 kg/m².  Wt Readings from Last 1 Encounters:   07/14/25 123 kg (271 lb)     IBW (Ideal Body Weight): 52.4 kg    Ideal body weight: 52.4 kg (115 lb 8.3 oz)  Adjusted ideal body weight: 80.6 kg (177 lb 11.4 oz)

## 2025-07-14 NOTE — CASE MANAGEMENT
Case Management Assessment & Discharge Planning Note    Patient name Mattie Joy  Location /-01 MRN [id number]  : 1960 Date 2025       Current Admission Date: 2025  Current Admission Diagnosis:Asthma-COPD overlap syndrome with flare (HCC)   Patient Active Problem List    Diagnosis Date Noted    Diabetic retinopathy associated with type 2 diabetes mellitus (HCC) 2025    Chronic diastolic heart failure (HCC) 2025    Impaired functional mobility, balance, gait, and endurance 2024    Frailty 2024    IVELISSE (acute kidney injury) (HCC) 2024    Asthma-COPD overlap syndrome with flare (HCC) 2023    COPD, severe (HCC) 2023    Hepatic steatosis 2023    Morbid obesity ([facility]) 02/15/2023    Diabetic polyneuropathy associated with type 2 diabetes mellitus (HCC) 11/15/2021    Type 2 diabetes mellitus with hyperglycemia ([facility]) 2021    Tubular adenoma of colon 2021    Transaminitis 2021    Gastric polyp 2021    Class 3 severe obesity in adult 2021    Pulmonary hypertension (HCC) 2020    Chronic respiratory failure with hypoxia and hypercapnia ([facility]) 2020    Insomnia 10/28/2020    Abnormal CT of the chest 2020    Sepsis ([facility]) 2020    Lactic acidosis 2020    Chronic diastolic CHF (congestive heart failure) ([facility]) 2020    Microcytic anemia 2020    Neck pain, chronic 2019    Current moderate episode of major depressive disorder without prior episode ([facility]) 2019    Longstanding persistent atrial fibrillation (HCC) 2018    Severe persistent asthma 2018    Hypokalemia 2018    Abnormal computed tomography angiography (CTA) of abdomen 2018    Abnormal CT of the abdomen 2018    Iron deficiency anemia due to chronic blood loss 2018    Type 2 diabetes mellitus with stage 2 chronic kidney disease, with long-term current use of insulin ([facility])      Ganglion cyst of flexor tendon sheath 12/14/2017    Paresthesia of upper extremity 09/20/2017    Cervical radiculopathy 09/13/2017    Elevated liver enzymes 12/30/2015    Sexual dysfunction 11/11/2014    Chronic low back pain 10/15/2014    Hypercholesterolemia 09/09/2014    Lumbar radiculopathy 09/09/2014    Esophageal reflux 06/23/2014    Restless leg syndrome 04/08/2014    Hypertensive heart and chronic kidney disease with heart failure and stage 1 through stage 4 chronic kidney disease, or chronic kidney disease (HCC) 03/24/2014    Obstructive sleep apnea 03/09/2014      LOS (days): 0  Geometric Mean LOS (GMLOS) (days):   Days to GMLOS:     OBJECTIVE:    Risk of Unplanned Readmission Score: 20.79         Current admission status: Inpatient       Preferred Pharmacy:   [facility]- [place], [place] - [address]  [address] [address]  Phone: [phone] Fax: [phone]    [facility] - [place], [place] - [address] [place] [address]  [address]  [place] [address]  Phone: [phone] Fax: [phone]    [id number] [place] - [address]  [address] [facility] [place] [address]  Phone: [phone] Fax: [phone]    Primary Care Provider: Laith Olivares MD    Primary Insurance: MEDICARE  Secondary Insurance: AET[employer]    ASSESSMENT:  Active Health Care Proxies    There are no active Health Care Proxies on file.       Advance Directives  Does patient have a Health Care POA?: No  Does patient have Advance Directives?: No  Primary Contact: renetta Cuevas-she is working with Palliative SW on HCA/LW              Patient Information  Admitted from:: Home  Mental Status: Alert  During Assessment patient was accompanied by: Not accompanied during assessment  Assessment information provided by:: Patient  Primary Caregiver: Self  Support Systems: Daughter, Family members  What city do you live in?:  [place]  Home entry access options. Select all that apply.: Stairs  Number of steps to enter home.: 2  Do the steps have railings?: No  Type of Current Residence: [facility]  Living Arrangements: Lives Alone    Activities of Daily Living Prior to Admission  Functional Status: Assistance  Completes ADLs independently?: No  Level of ADL dependence: Assistance  Ambulates independently?: Yes  Does patient use assisted devices?: Yes  Assisted Devices (DME) used: Walker  Does patient currently own DME?: Yes  What DME does the patient currently own?: Walker, Shower Chair, Portable Oxygen tanks, Other (Comment) (Adapt for O2)  Does patient have a history of Outpatient Therapy (PT/OT)?: No  Does the patient have a history of Short-Term Rehab?: No  Does patient have a history of HHC?: Yes ([facility])         Patient Information Continued  Income Source: Pension/shelter  Does patient have prescription coverage?: Yes  Can the patient afford their medications and any related supplies (such as glucometers or test strips)?: N/A  Does patient receive dialysis treatments?: No  Does patient have a history of substance abuse?: No  Does patient have a history of Mental Health Diagnosis?: Yes  Is patient receiving treatment for mental health?: Yes (depression-PCP)  Has patient received inpatient treatment related to mental health in the last 2 years?: No         Means of Transportation  Means of Transport to Appts:: Family transport          DISCHARGE DETAILS:    Discharge planning discussed with:: pt  Freedom of Choice: Yes                   Contacts  Patient Contacts: renetta Rose  Relationship to Patient:: Family  Contact Method: Phone  Phone Number: [phone]  Reason/Outcome: Continuity of Care, Emergency Contact, Discharge Planning                                                                        Additional Comments: resides alone, ran home, has O2-Adapt, walker, shower chair.  Has [facility] Palliative care RN  and tima JOSHUA monthly, assisting her with HCA/LW

## 2025-07-14 NOTE — ED NOTES
"Pt Resting comfortably on bed at 5L NC pt reports feeling much better reports \"my breathing almost feels back to baseline.\" Pt assisted to bed side commode/ Pt noted to become Tachypniec, PO2 noted to drop to 86% on 5 L NC. Pt reports feeling SOB with minimal exertion. Pt assisted back to bed. Pt noted to come back to base line after a few deep breaths and reported feeling better once again. Dr. Landry notified of pt's exertional dyspnea.      Alivia Ponce RN  07/13/25 2124    "

## 2025-07-15 LAB
ANION GAP SERPL CALCULATED.3IONS-SCNC: 7 MMOL/L (ref 4–13)
BASOPHILS # BLD AUTO: 0.02 THOUSANDS/ÂΜL (ref 0–0.1)
BASOPHILS NFR BLD AUTO: 0 % (ref 0–1)
BUN SERPL-MCNC: 29 MG/DL (ref 5–25)
CALCIUM SERPL-MCNC: 9 MG/DL (ref 8.4–10.2)
CHLORIDE SERPL-SCNC: 93 MMOL/L (ref 96–108)
CO2 SERPL-SCNC: 31 MMOL/L (ref 21–32)
CREAT SERPL-MCNC: 0.77 MG/DL (ref 0.6–1.3)
EOSINOPHIL # BLD AUTO: 0 THOUSAND/ÂΜL (ref 0–0.61)
EOSINOPHIL NFR BLD AUTO: 0 % (ref 0–6)
ERYTHROCYTE [DISTWIDTH] IN BLOOD BY AUTOMATED COUNT: 22 % (ref 11.6–15.1)
GFR SERPL CREATININE-BSD FRML MDRD: 80 ML/MIN/1.73SQ M
GLUCOSE SERPL-MCNC: 333 MG/DL (ref 65–140)
GLUCOSE SERPL-MCNC: 339 MG/DL (ref 65–140)
GLUCOSE SERPL-MCNC: 390 MG/DL (ref 65–140)
GLUCOSE SERPL-MCNC: 431 MG/DL (ref 65–140)
GLUCOSE SERPL-MCNC: 461 MG/DL (ref 65–140)
GLUCOSE SERPL-MCNC: 486 MG/DL (ref 65–140)
HCT VFR BLD AUTO: 30.4 % (ref 34.8–46.1)
HGB BLD-MCNC: 8.2 G/DL (ref 11.5–15.4)
IMM GRANULOCYTES # BLD AUTO: 0.13 THOUSAND/UL (ref 0–0.2)
IMM GRANULOCYTES NFR BLD AUTO: 1 % (ref 0–2)
LYMPHOCYTES # BLD AUTO: 0.56 THOUSANDS/ÂΜL (ref 0.6–4.47)
LYMPHOCYTES NFR BLD AUTO: 4 % (ref 14–44)
MAGNESIUM SERPL-MCNC: 2.8 MG/DL (ref 1.9–2.7)
MCH RBC QN AUTO: 16.4 PG (ref 26.8–34.3)
MCHC RBC AUTO-ENTMCNC: 27 G/DL (ref 31.4–37.4)
MCV RBC AUTO: 61 FL (ref 82–98)
MONOCYTES # BLD AUTO: 0.36 THOUSAND/ÂΜL (ref 0.17–1.22)
MONOCYTES NFR BLD AUTO: 3 % (ref 4–12)
NEUTROPHILS # BLD AUTO: 12.12 THOUSANDS/ÂΜL (ref 1.85–7.62)
NEUTS SEG NFR BLD AUTO: 92 % (ref 43–75)
NRBC BLD AUTO-RTO: 0 /100 WBCS
PLATELET # BLD AUTO: 326 THOUSANDS/UL (ref 149–390)
POTASSIUM SERPL-SCNC: 4.5 MMOL/L (ref 3.5–5.3)
PROCALCITONIN SERPL-MCNC: 0.1 NG/ML
RBC # BLD AUTO: 4.99 MILLION/UL (ref 3.81–5.12)
SODIUM SERPL-SCNC: 131 MMOL/L (ref 135–147)
WBC # BLD AUTO: 13.19 THOUSAND/UL (ref 4.31–10.16)

## 2025-07-15 PROCEDURE — 94760 N-INVAS EAR/PLS OXIMETRY 1: CPT

## 2025-07-15 PROCEDURE — 99232 SBSQ HOSP IP/OBS MODERATE 35: CPT | Performed by: INTERNAL MEDICINE

## 2025-07-15 PROCEDURE — 80048 BASIC METABOLIC PNL TOTAL CA: CPT | Performed by: PHYSICIAN ASSISTANT

## 2025-07-15 PROCEDURE — 82948 REAGENT STRIP/BLOOD GLUCOSE: CPT

## 2025-07-15 PROCEDURE — 85025 COMPLETE CBC W/AUTO DIFF WBC: CPT | Performed by: PHYSICIAN ASSISTANT

## 2025-07-15 PROCEDURE — 94640 AIRWAY INHALATION TREATMENT: CPT

## 2025-07-15 PROCEDURE — 84145 PROCALCITONIN (PCT): CPT | Performed by: INTERNAL MEDICINE

## 2025-07-15 PROCEDURE — 83735 ASSAY OF MAGNESIUM: CPT | Performed by: INTERNAL MEDICINE

## 2025-07-15 RX ORDER — LORATADINE 10 MG/1
10 TABLET ORAL DAILY
Status: DISCONTINUED | OUTPATIENT
Start: 2025-07-15 | End: 2025-07-17 | Stop reason: HOSPADM

## 2025-07-15 RX ORDER — INSULIN LISPRO 100 [IU]/ML
4-20 INJECTION, SOLUTION INTRAVENOUS; SUBCUTANEOUS
Status: DISCONTINUED | OUTPATIENT
Start: 2025-07-15 | End: 2025-07-17 | Stop reason: HOSPADM

## 2025-07-15 RX ORDER — METHYLPREDNISOLONE SODIUM SUCCINATE 40 MG/ML
40 INJECTION, POWDER, LYOPHILIZED, FOR SOLUTION INTRAMUSCULAR; INTRAVENOUS EVERY 8 HOURS SCHEDULED
Status: COMPLETED | OUTPATIENT
Start: 2025-07-15 | End: 2025-07-15

## 2025-07-15 RX ORDER — METHYLPREDNISOLONE SODIUM SUCCINATE 40 MG/ML
40 INJECTION, POWDER, LYOPHILIZED, FOR SOLUTION INTRAMUSCULAR; INTRAVENOUS EVERY 12 HOURS SCHEDULED
Status: DISCONTINUED | OUTPATIENT
Start: 2025-07-16 | End: 2025-07-17

## 2025-07-15 RX ORDER — SENNOSIDES 8.6 MG
2 TABLET ORAL ONCE
Status: COMPLETED | OUTPATIENT
Start: 2025-07-15 | End: 2025-07-15

## 2025-07-15 RX ADMIN — IPRATROPIUM BROMIDE 0.5 MG: 0.5 SOLUTION RESPIRATORY (INHALATION) at 14:29

## 2025-07-15 RX ADMIN — TRAZODONE HYDROCHLORIDE 150 MG: 150 TABLET ORAL at 21:35

## 2025-07-15 RX ADMIN — BUMETANIDE 6 MG: 1 TABLET ORAL at 19:00

## 2025-07-15 RX ADMIN — FORMOTEROL FUMARATE 20 MCG: 20 SOLUTION RESPIRATORY (INHALATION) at 19:24

## 2025-07-15 RX ADMIN — LEVALBUTEROL HYDROCHLORIDE 1.25 MG: 1.25 SOLUTION RESPIRATORY (INHALATION) at 10:47

## 2025-07-15 RX ADMIN — INSULIN GLARGINE 40 UNITS: 100 INJECTION, SOLUTION SUBCUTANEOUS at 21:34

## 2025-07-15 RX ADMIN — DILTIAZEM HYDROCHLORIDE 120 MG: 120 CAPSULE, COATED, EXTENDED RELEASE ORAL at 08:05

## 2025-07-15 RX ADMIN — APIXABAN 5 MG: 5 TABLET, FILM COATED ORAL at 19:00

## 2025-07-15 RX ADMIN — SENNOSIDES 17.2 MG: 8.6 TABLET, FILM COATED ORAL at 08:04

## 2025-07-15 RX ADMIN — LEVALBUTEROL HYDROCHLORIDE 1.25 MG: 1.25 SOLUTION RESPIRATORY (INHALATION) at 19:24

## 2025-07-15 RX ADMIN — DULOXETINE 60 MG: 30 CAPSULE, DELAYED RELEASE ORAL at 08:05

## 2025-07-15 RX ADMIN — LEVALBUTEROL HYDROCHLORIDE 1.25 MG: 1.25 SOLUTION RESPIRATORY (INHALATION) at 14:29

## 2025-07-15 RX ADMIN — INSULIN LISPRO 20 UNITS: 100 INJECTION, SOLUTION INTRAVENOUS; SUBCUTANEOUS at 19:03

## 2025-07-15 RX ADMIN — APIXABAN 5 MG: 5 TABLET, FILM COATED ORAL at 08:04

## 2025-07-15 RX ADMIN — PREGABALIN 150 MG: 100 CAPSULE ORAL at 08:04

## 2025-07-15 RX ADMIN — DEXTRAN 70, GLYCERIN, HYPROMELLOSE 1 DROP: 1; 2; 3 SOLUTION/ DROPS OPHTHALMIC at 19:05

## 2025-07-15 RX ADMIN — INSULIN LISPRO 12 UNITS: 100 INJECTION, SOLUTION INTRAVENOUS; SUBCUTANEOUS at 12:18

## 2025-07-15 RX ADMIN — METHYLPREDNISOLONE SODIUM SUCCINATE 40 MG: 40 INJECTION, POWDER, FOR SOLUTION INTRAMUSCULAR; INTRAVENOUS at 13:43

## 2025-07-15 RX ADMIN — METHYLPREDNISOLONE SODIUM SUCCINATE 40 MG: 40 INJECTION, POWDER, FOR SOLUTION INTRAMUSCULAR; INTRAVENOUS at 21:35

## 2025-07-15 RX ADMIN — METHYLPREDNISOLONE SODIUM SUCCINATE 40 MG: 40 INJECTION, POWDER, FOR SOLUTION INTRAMUSCULAR; INTRAVENOUS at 05:22

## 2025-07-15 RX ADMIN — CEFTRIAXONE 1000 MG: 1 INJECTION, SOLUTION INTRAVENOUS at 08:40

## 2025-07-15 RX ADMIN — BUDESONIDE 0.5 MG: 0.5 INHALANT RESPIRATORY (INHALATION) at 19:24

## 2025-07-15 RX ADMIN — INSULIN LISPRO 8 UNITS: 100 INJECTION, SOLUTION INTRAVENOUS; SUBCUTANEOUS at 08:04

## 2025-07-15 RX ADMIN — INSULIN LISPRO 20 UNITS: 100 INJECTION, SOLUTION INTRAVENOUS; SUBCUTANEOUS at 21:32

## 2025-07-15 RX ADMIN — METOPROLOL SUCCINATE 100 MG: 50 TABLET, EXTENDED RELEASE ORAL at 08:05

## 2025-07-15 RX ADMIN — BUDESONIDE 0.5 MG: 0.5 INHALANT RESPIRATORY (INHALATION) at 10:48

## 2025-07-15 RX ADMIN — PANTOPRAZOLE SODIUM 40 MG: 40 TABLET, DELAYED RELEASE ORAL at 05:22

## 2025-07-15 RX ADMIN — ACETAMINOPHEN 650 MG: 325 TABLET ORAL at 21:35

## 2025-07-15 RX ADMIN — SPIRONOLACTONE 100 MG: 25 TABLET ORAL at 08:05

## 2025-07-15 RX ADMIN — ATORVASTATIN CALCIUM 40 MG: 40 TABLET, FILM COATED ORAL at 08:05

## 2025-07-15 RX ADMIN — BUMETANIDE 6 MG: 1 TABLET ORAL at 08:04

## 2025-07-15 RX ADMIN — LORATADINE 10 MG: 10 TABLET ORAL at 12:21

## 2025-07-15 RX ADMIN — DEXTRAN 70, GLYCERIN, HYPROMELLOSE 1 DROP: 1; 2; 3 SOLUTION/ DROPS OPHTHALMIC at 12:20

## 2025-07-15 RX ADMIN — MONTELUKAST 10 MG: 10 TABLET, FILM COATED ORAL at 21:35

## 2025-07-15 RX ADMIN — FORMOTEROL FUMARATE 20 MCG: 20 SOLUTION RESPIRATORY (INHALATION) at 10:48

## 2025-07-15 RX ADMIN — INSULIN LISPRO 20 UNITS: 100 INJECTION, SOLUTION INTRAVENOUS; SUBCUTANEOUS at 19:04

## 2025-07-15 RX ADMIN — INSULIN LISPRO 20 UNITS: 100 INJECTION, SOLUTION INTRAVENOUS; SUBCUTANEOUS at 12:18

## 2025-07-15 RX ADMIN — IPRATROPIUM BROMIDE 0.5 MG: 0.5 SOLUTION RESPIRATORY (INHALATION) at 19:24

## 2025-07-15 RX ADMIN — INSULIN LISPRO 20 UNITS: 100 INJECTION, SOLUTION INTRAVENOUS; SUBCUTANEOUS at 08:03

## 2025-07-15 RX ADMIN — IPRATROPIUM BROMIDE 0.5 MG: 0.5 SOLUTION RESPIRATORY (INHALATION) at 10:48

## 2025-07-15 NOTE — PLAN OF CARE
Problem: Potential for Falls  Goal: Patient will remain free of falls  Description: INTERVENTIONS:  - Educate patient/family on patient safety including physical limitations  - Instruct patient to call for assistance with activity   - Consider consulting OT/PT to assist with strengthening/mobility based on AM PAC & JH-HLM score  - Consult OT/PT to assist with strengthening/mobility   - Keep Call bell within reach  - Keep bed low and locked with side rails adjusted as appropriate  - Keep care items and personal belongings within reach  - Initiate and maintain comfort rounds  - Make Fall Risk Sign visible to staff  - Offer Toileting every 2 Hours, in advance of need  - Initiate/Maintain bed and chair alarm  - Obtain necessary fall risk management equipment:     - Apply yellow socks and bracelet for high fall risk patients  - Consider moving patient to room near nurses station  Outcome: Progressing     Problem: RESPIRATORY - ADULT  Goal: Achieves optimal ventilation and oxygenation  Description: INTERVENTIONS:  - Assess for changes in respiratory status  - Assess for changes in mentation and behavior  - Position to facilitate oxygenation and minimize respiratory effort  - Oxygen administered by appropriate delivery if ordered  - Initiate smoking cessation education as indicated  - Encourage broncho-pulmonary hygiene including cough, deep breathe, Incentive Spirometry  - Assess the need for suctioning and aspirate as needed  - Assess and instruct to report SOB or any respiratory difficulty  - Respiratory Therapy support as indicated  Outcome: Progressing

## 2025-07-16 LAB
ANION GAP SERPL CALCULATED.3IONS-SCNC: 7 MMOL/L (ref 4–13)
ANISOCYTOSIS BLD QL SMEAR: PRESENT
BASOPHILS # BLD MANUAL: 0 THOUSAND/UL (ref 0–0.1)
BASOPHILS NFR MAR MANUAL: 0 % (ref 0–1)
BUN SERPL-MCNC: 39 MG/DL (ref 5–25)
CALCIUM SERPL-MCNC: 8.9 MG/DL (ref 8.4–10.2)
CHLORIDE SERPL-SCNC: 89 MMOL/L (ref 96–108)
CO2 SERPL-SCNC: 34 MMOL/L (ref 21–32)
CREAT SERPL-MCNC: 0.85 MG/DL (ref 0.6–1.3)
EOSINOPHIL # BLD MANUAL: 0 THOUSAND/UL (ref 0–0.4)
EOSINOPHIL NFR BLD MANUAL: 0 % (ref 0–6)
ERYTHROCYTE [DISTWIDTH] IN BLOOD BY AUTOMATED COUNT: 22.5 % (ref 11.6–15.1)
GFR SERPL CREATININE-BSD FRML MDRD: 71 ML/MIN/1.73SQ M
GLUCOSE SERPL-MCNC: 248 MG/DL (ref 65–140)
GLUCOSE SERPL-MCNC: 261 MG/DL (ref 65–140)
GLUCOSE SERPL-MCNC: 285 MG/DL (ref 65–140)
GLUCOSE SERPL-MCNC: 362 MG/DL (ref 65–140)
GLUCOSE SERPL-MCNC: 405 MG/DL (ref 65–140)
GLUCOSE SERPL-MCNC: 432 MG/DL (ref 65–140)
HCT VFR BLD AUTO: 30.6 % (ref 34.8–46.1)
HGB BLD-MCNC: 8.3 G/DL (ref 11.5–15.4)
LYMPHOCYTES # BLD AUTO: 1.02 THOUSAND/UL (ref 0.6–4.47)
LYMPHOCYTES # BLD AUTO: 7 % (ref 14–44)
MCH RBC QN AUTO: 16.6 PG (ref 26.8–34.3)
MCHC RBC AUTO-ENTMCNC: 27.1 G/DL (ref 31.4–37.4)
MCV RBC AUTO: 61 FL (ref 82–98)
MICROCYTES BLD QL AUTO: PRESENT
MONOCYTES # BLD AUTO: 0 THOUSAND/UL (ref 0–1.22)
MONOCYTES NFR BLD: 0 % (ref 4–12)
NEUTROPHILS # BLD MANUAL: 13.59 THOUSAND/UL (ref 1.85–7.62)
NEUTS SEG NFR BLD AUTO: 93 % (ref 43–75)
OVALOCYTES BLD QL SMEAR: PRESENT
PLATELET # BLD AUTO: 360 THOUSANDS/UL (ref 149–390)
PLATELET BLD QL SMEAR: ADEQUATE
POTASSIUM SERPL-SCNC: 4.5 MMOL/L (ref 3.5–5.3)
RBC # BLD AUTO: 5.01 MILLION/UL (ref 3.81–5.12)
RBC MORPH BLD: PRESENT
SODIUM SERPL-SCNC: 130 MMOL/L (ref 135–147)
WBC # BLD AUTO: 14.61 THOUSAND/UL (ref 4.31–10.16)

## 2025-07-16 PROCEDURE — 80048 BASIC METABOLIC PNL TOTAL CA: CPT | Performed by: INTERNAL MEDICINE

## 2025-07-16 PROCEDURE — 94660 CPAP INITIATION&MGMT: CPT

## 2025-07-16 PROCEDURE — 85007 BL SMEAR W/DIFF WBC COUNT: CPT | Performed by: INTERNAL MEDICINE

## 2025-07-16 PROCEDURE — 85027 COMPLETE CBC AUTOMATED: CPT | Performed by: INTERNAL MEDICINE

## 2025-07-16 PROCEDURE — 82948 REAGENT STRIP/BLOOD GLUCOSE: CPT

## 2025-07-16 PROCEDURE — 99232 SBSQ HOSP IP/OBS MODERATE 35: CPT | Performed by: INTERNAL MEDICINE

## 2025-07-16 PROCEDURE — 94760 N-INVAS EAR/PLS OXIMETRY 1: CPT

## 2025-07-16 PROCEDURE — 94640 AIRWAY INHALATION TREATMENT: CPT

## 2025-07-16 RX ORDER — INSULIN LISPRO 100 [IU]/ML
25 INJECTION, SOLUTION INTRAVENOUS; SUBCUTANEOUS
Status: DISCONTINUED | OUTPATIENT
Start: 2025-07-16 | End: 2025-07-17 | Stop reason: HOSPADM

## 2025-07-16 RX ORDER — INSULIN GLARGINE 100 [IU]/ML
50 INJECTION, SOLUTION SUBCUTANEOUS
Status: DISCONTINUED | OUTPATIENT
Start: 2025-07-16 | End: 2025-07-17 | Stop reason: HOSPADM

## 2025-07-16 RX ADMIN — DEXTRAN 70, GLYCERIN, HYPROMELLOSE 1 DROP: 1; 2; 3 SOLUTION/ DROPS OPHTHALMIC at 19:52

## 2025-07-16 RX ADMIN — INSULIN LISPRO 20 UNITS: 100 INJECTION, SOLUTION INTRAVENOUS; SUBCUTANEOUS at 12:00

## 2025-07-16 RX ADMIN — METHYLPREDNISOLONE SODIUM SUCCINATE 40 MG: 40 INJECTION, POWDER, FOR SOLUTION INTRAMUSCULAR; INTRAVENOUS at 08:38

## 2025-07-16 RX ADMIN — IPRATROPIUM BROMIDE 0.5 MG: 0.5 SOLUTION RESPIRATORY (INHALATION) at 07:09

## 2025-07-16 RX ADMIN — INSULIN LISPRO 20 UNITS: 100 INJECTION, SOLUTION INTRAVENOUS; SUBCUTANEOUS at 08:41

## 2025-07-16 RX ADMIN — INSULIN LISPRO 20 UNITS: 100 INJECTION, SOLUTION INTRAVENOUS; SUBCUTANEOUS at 16:29

## 2025-07-16 RX ADMIN — LORATADINE 10 MG: 10 TABLET ORAL at 08:38

## 2025-07-16 RX ADMIN — INSULIN LISPRO 16 UNITS: 100 INJECTION, SOLUTION INTRAVENOUS; SUBCUTANEOUS at 21:36

## 2025-07-16 RX ADMIN — BUMETANIDE 6 MG: 1 TABLET ORAL at 08:39

## 2025-07-16 RX ADMIN — APIXABAN 5 MG: 5 TABLET, FILM COATED ORAL at 08:39

## 2025-07-16 RX ADMIN — MONTELUKAST 10 MG: 10 TABLET, FILM COATED ORAL at 21:34

## 2025-07-16 RX ADMIN — LEVALBUTEROL HYDROCHLORIDE 1.25 MG: 1.25 SOLUTION RESPIRATORY (INHALATION) at 13:38

## 2025-07-16 RX ADMIN — FORMOTEROL FUMARATE 20 MCG: 20 SOLUTION RESPIRATORY (INHALATION) at 19:55

## 2025-07-16 RX ADMIN — SPIRONOLACTONE 100 MG: 25 TABLET ORAL at 08:38

## 2025-07-16 RX ADMIN — PREGABALIN 150 MG: 100 CAPSULE ORAL at 08:38

## 2025-07-16 RX ADMIN — TRAZODONE HYDROCHLORIDE 150 MG: 150 TABLET ORAL at 21:34

## 2025-07-16 RX ADMIN — IPRATROPIUM BROMIDE 0.5 MG: 0.5 SOLUTION RESPIRATORY (INHALATION) at 13:38

## 2025-07-16 RX ADMIN — LEVALBUTEROL HYDROCHLORIDE 1.25 MG: 1.25 SOLUTION RESPIRATORY (INHALATION) at 07:09

## 2025-07-16 RX ADMIN — DILTIAZEM HYDROCHLORIDE 120 MG: 120 CAPSULE, COATED, EXTENDED RELEASE ORAL at 08:39

## 2025-07-16 RX ADMIN — METOPROLOL SUCCINATE 100 MG: 50 TABLET, EXTENDED RELEASE ORAL at 08:38

## 2025-07-16 RX ADMIN — BUMETANIDE 6 MG: 1 TABLET ORAL at 18:30

## 2025-07-16 RX ADMIN — BUDESONIDE 0.5 MG: 0.5 INHALANT RESPIRATORY (INHALATION) at 07:09

## 2025-07-16 RX ADMIN — APIXABAN 5 MG: 5 TABLET, FILM COATED ORAL at 18:31

## 2025-07-16 RX ADMIN — INSULIN GLARGINE 50 UNITS: 100 INJECTION, SOLUTION SUBCUTANEOUS at 21:34

## 2025-07-16 RX ADMIN — ATORVASTATIN CALCIUM 40 MG: 40 TABLET, FILM COATED ORAL at 08:38

## 2025-07-16 RX ADMIN — BUDESONIDE 0.5 MG: 0.5 INHALANT RESPIRATORY (INHALATION) at 19:55

## 2025-07-16 RX ADMIN — PANTOPRAZOLE SODIUM 40 MG: 40 TABLET, DELAYED RELEASE ORAL at 05:56

## 2025-07-16 RX ADMIN — INSULIN LISPRO 25 UNITS: 100 INJECTION, SOLUTION INTRAVENOUS; SUBCUTANEOUS at 16:29

## 2025-07-16 RX ADMIN — DEXTRAN 70, GLYCERIN, HYPROMELLOSE 1 DROP: 1; 2; 3 SOLUTION/ DROPS OPHTHALMIC at 08:45

## 2025-07-16 RX ADMIN — IPRATROPIUM BROMIDE 0.5 MG: 0.5 SOLUTION RESPIRATORY (INHALATION) at 19:55

## 2025-07-16 RX ADMIN — INSULIN LISPRO 12 UNITS: 100 INJECTION, SOLUTION INTRAVENOUS; SUBCUTANEOUS at 08:42

## 2025-07-16 RX ADMIN — LEVALBUTEROL HYDROCHLORIDE 1.25 MG: 1.25 SOLUTION RESPIRATORY (INHALATION) at 19:55

## 2025-07-16 RX ADMIN — METHYLPREDNISOLONE SODIUM SUCCINATE 40 MG: 40 INJECTION, POWDER, FOR SOLUTION INTRAMUSCULAR; INTRAVENOUS at 21:34

## 2025-07-16 RX ADMIN — FORMOTEROL FUMARATE 20 MCG: 20 SOLUTION RESPIRATORY (INHALATION) at 07:09

## 2025-07-16 RX ADMIN — ACETAMINOPHEN 650 MG: 325 TABLET ORAL at 19:52

## 2025-07-16 RX ADMIN — CEFTRIAXONE 1000 MG: 1 INJECTION, SOLUTION INTRAVENOUS at 08:45

## 2025-07-16 RX ADMIN — DULOXETINE 60 MG: 30 CAPSULE, DELAYED RELEASE ORAL at 08:38

## 2025-07-16 NOTE — PLAN OF CARE
Problem: Potential for Falls  Goal: Patient will remain free of falls  Description: INTERVENTIONS:  - Educate patient/family on patient safety including physical limitations  - Instruct patient to call for assistance with activity   - Consider consulting OT/PT to assist with strengthening/mobility based on AM PAC & JH-HLM score  - Consult OT/PT to assist with strengthening/mobility   - Keep Call bell within reach  - Keep bed low and locked with side rails adjusted as appropriate  - Keep care items and personal belongings within reach  - Initiate and maintain comfort rounds  - Make Fall Risk Sign visible to staff  - Offer Toileting every 2 Hours, in advance of need  - Initiate/Maintain alarm  - Obtain necessary fall risk management equipment  - Apply yellow socks and bracelet for high fall risk patients  - Consider moving patient to room near nurses station  7/16/2025 0224 by Elmer Cortez RN  Outcome: Progressing  7/16/2025 0223 by Elmer Cortez RN  Outcome: Progressing  7/16/2025 0223 by Elmer Cortez RN  Outcome: Progressing     Problem: PAIN - ADULT  Goal: Verbalizes/displays adequate comfort level or baseline comfort level  Description: Interventions:  - Encourage patient to monitor pain and request assistance  - Assess pain using appropriate pain scale  - Administer analgesics as ordered based on type and severity of pain and evaluate response  - Implement non-pharmacological measures as appropriate and evaluate response  - Consider cultural and social influences on pain and pain management  - Notify physician/advanced practitioner if interventions unsuccessful or patient reports new pain  - Educate patient/family on pain management process including their role and importance of  reporting pain   - Provide non-pharmacologic/complimentary pain relief interventions  7/16/2025 0224 by Elmer Cortez RN  Outcome: Progressing  7/16/2025 0223 by Elmer Cortez RN  Outcome: Progressing  7/16/2025 0223 by Elmer Cortez  RN  Outcome: Progressing     Problem: INFECTION - ADULT  Goal: Absence of fever/infection during neutropenic period  Description: INTERVENTIONS:  - Monitor WBC  - Perform strict hand hygiene  - Limit to healthy visitors only  - No plants, dried, fresh or silk flowers with mcnair in patient room  7/16/2025 0224 by Elmer Cortez RN  Outcome: Progressing  7/16/2025 0223 by Elmer Cortez RN  Outcome: Progressing  7/16/2025 0223 by Elmer Cortez RN  Outcome: Progressing     Problem: SAFETY ADULT  Goal: Patient will remain free of falls  Description: INTERVENTIONS:  - Educate patient/family on patient safety including physical limitations  - Instruct patient to call for assistance with activity   - Consider consulting OT/PT to assist with strengthening/mobility based on AM PAC & JH-HLM score  - Consult OT/PT to assist with strengthening/mobility   - Keep Call bell within reach  - Keep bed low and locked with side rails adjusted as appropriate  - Keep care items and personal belongings within reach  - Initiate and maintain comfort rounds  - Make Fall Risk Sign visible to staff  - Offer Toileting every 2 Hours, in advance of need  - Initiate/Maintain alarm  - Obtain necessary fall risk management equipment  - Apply yellow socks and bracelet for high fall risk patients  - Consider moving patient to room near nurses station  7/16/2025 0224 by Elmer Cortez RN  Outcome: Progressing  7/16/2025 0223 by Elmer Cortez RN  Outcome: Progressing  7/16/2025 0223 by Elmer Cortez RN  Outcome: Progressing     Problem: DISCHARGE PLANNING  Goal: Discharge to home or other facility with appropriate resources  Description: INTERVENTIONS:  - Identify barriers to discharge w/patient and caregiver  - Arrange for needed discharge resources and transportation as appropriate  - Identify discharge learning needs (meds, wound care, etc.)  - Arrange for interpretive services to assist at discharge as needed  - Refer to Case Management Department for  coordinating discharge planning if the patient needs post-hospital services based on physician/advanced practitioner order or complex needs related to functional status, cognitive ability, or social support system  7/16/2025 0224 by Elmer Cortez RN  Outcome: Progressing  7/16/2025 0223 by Elmer Cortez RN  Outcome: Progressing  7/16/2025 0223 by Elmer Cortez RN  Outcome: Progressing     Problem: Knowledge Deficit  Goal: Patient/family/caregiver demonstrates understanding of disease process, treatment plan, medications, and discharge instructions  Description: Complete learning assessment and assess knowledge base.  Interventions:  - Provide teaching at level of understanding  - Provide teaching via preferred learning methods  7/16/2025 0224 by Elmer Cortez RN  Outcome: Progressing  7/16/2025 0223 by Elmer Cortez RN  Outcome: Progressing  7/16/2025 0223 by Elmer Cortez RN  Outcome: Progressing     Problem: RESPIRATORY - ADULT  Goal: Achieves optimal ventilation and oxygenation  Description: INTERVENTIONS:  - Assess for changes in respiratory status  - Assess for changes in mentation and behavior  - Position to facilitate oxygenation and minimize respiratory effort  - Oxygen administered by appropriate delivery if ordered  - Initiate smoking cessation education as indicated  - Encourage broncho-pulmonary hygiene including cough, deep breathe, Incentive Spirometry  - Assess the need for suctioning and aspirate as needed  - Assess and instruct to report SOB or any respiratory difficulty  - Respiratory Therapy support as indicated  7/16/2025 0224 by Elmer Cortez RN  Outcome: Progressing  7/16/2025 0223 by Elmer Cortez RN  Outcome: Progressing  7/16/2025 0223 by Elmer Cortez RN  Outcome: Progressing

## 2025-07-16 NOTE — PLAN OF CARE
Problem: Potential for Falls  Goal: Patient will remain free of falls  Description: INTERVENTIONS:  - Educate patient/family on patient safety including physical limitations  - Instruct patient to call for assistance with activity   - Consider consulting OT/PT to assist with strengthening/mobility based on AM PAC & JH-HLM score  - Consult OT/PT to assist with strengthening/mobility   - Keep Call bell within reach  - Keep bed low and locked with side rails adjusted as appropriate  - Keep care items and personal belongings within reach  - Initiate and maintain comfort rounds  - Make Fall Risk Sign visible to staff  - Offer Toileting every 2 Hours, in advance of need  - Initiate/Maintain alarm  - Obtain necessary fall risk management equipment  - Apply yellow socks and bracelet for high fall risk patients  - Consider moving patient to room near nurses station  Outcome: Progressing     Problem: PAIN - ADULT  Goal: Verbalizes/displays adequate comfort level or baseline comfort level  Description: Interventions:  - Encourage patient to monitor pain and request assistance  - Assess pain using appropriate pain scale  - Administer analgesics as ordered based on type and severity of pain and evaluate response  - Implement non-pharmacological measures as appropriate and evaluate response  - Consider cultural and social influences on pain and pain management  - Notify physician/advanced practitioner if interventions unsuccessful or patient reports new pain  - Educate patient/family on pain management process including their role and importance of  reporting pain   - Provide non-pharmacologic/complimentary pain relief interventions  Outcome: Progressing     Problem: INFECTION - ADULT  Goal: Absence of fever/infection during neutropenic period  Description: INTERVENTIONS:  - Monitor WBC  - Perform strict hand hygiene  - Limit to healthy visitors only  - No plants, dried, fresh or silk flowers with mcnair in patient room  Outcome:  Progressing     Problem: SAFETY ADULT  Goal: Patient will remain free of falls  Description: INTERVENTIONS:  - Educate patient/family on patient safety including physical limitations  - Instruct patient to call for assistance with activity   - Consider consulting OT/PT to assist with strengthening/mobility based on AM PAC & JH-HLM score  - Consult OT/PT to assist with strengthening/mobility   - Keep Call bell within reach  - Keep bed low and locked with side rails adjusted as appropriate  - Keep care items and personal belongings within reach  - Initiate and maintain comfort rounds  - Make Fall Risk Sign visible to staff  - Offer Toileting every 2 Hours, in advance of need  - Initiate/Maintain alarm  - Obtain necessary fall risk management equipment  - Apply yellow socks and bracelet for high fall risk patients  - Consider moving patient to room near nurses station  Outcome: Progressing     Problem: DISCHARGE PLANNING  Goal: Discharge to home or other facility with appropriate resources  Description: INTERVENTIONS:  - Identify barriers to discharge w/patient and caregiver  - Arrange for needed discharge resources and transportation as appropriate  - Identify discharge learning needs (meds, wound care, etc.)  - Arrange for interpretive services to assist at discharge as needed  - Refer to Case Management Department for coordinating discharge planning if the patient needs post-hospital services based on physician/advanced practitioner order or complex needs related to functional status, cognitive ability, or social support system  Outcome: Progressing     Problem: Knowledge Deficit  Goal: Patient/family/caregiver demonstrates understanding of disease process, treatment plan, medications, and discharge instructions  Description: Complete learning assessment and assess knowledge base.  Interventions:  - Provide teaching at level of understanding  - Provide teaching via preferred learning methods  Outcome: Progressing      Problem: RESPIRATORY - ADULT  Goal: Achieves optimal ventilation and oxygenation  Description: INTERVENTIONS:  - Assess for changes in respiratory status  - Assess for changes in mentation and behavior  - Position to facilitate oxygenation and minimize respiratory effort  - Oxygen administered by appropriate delivery if ordered  - Initiate smoking cessation education as indicated  - Encourage broncho-pulmonary hygiene including cough, deep breathe, Incentive Spirometry  - Assess the need for suctioning and aspirate as needed  - Assess and instruct to report SOB or any respiratory difficulty  - Respiratory Therapy support as indicated  Outcome: Progressing

## 2025-07-16 NOTE — PROGRESS NOTES
Progress Note - Hospitalist   Name: Mattie Joy 64 y.o. female I MRN: [id number]  Unit/Bed#: -01 I Date of Admission: 7/13/2025   Date of Service: 7/16/2025 I Hospital Day: 2    Assessment & Plan  Acute exacerbation of asthma/COPD overlap syndrome  Chief complaint of worsening shortness of breath, weakness.  Was not using her nebulizers at home as previously ordered.  Found to be in COPD flare, hypoxic in the ER requiring 5 L/min.  Resume Pulmicort, Perforomist  Resume Singulair  Xopenex/Atrovent as needed  On Solu-Medrol every 12 hours  Pulmonary follow-up  Will need outpatient pulmonary follow-up at rehab  IVELISSE (acute kidney injury) (Formerly [facility])  Mild IVELISSE with creatinine 1.4 on admission, baseline around 0.7  Hold nephrotoxins, spironolactone, Bumex  Hold IV fluids in the setting of diastolic heart failure  Restarted on diuretics 7/15/2025  Trend BMP  Type 2 diabetes mellitus with stage 2 chronic kidney disease, with long-term current use of insulin (Formerly [facility])  Lab Results   Component Value Date    HGBA1C 8.8 (A) 04/22/2025       Recent Labs     07/15/25  1557 07/15/25  2127 07/16/25  0254 07/16/25  0721   POCGLU 390* 461* 261* 285*       Blood Sugar Average: Last 72 hrs:  (P) [id number] home Lantus 40 units nightly  Resume Humalog 20 units daily with meals  Sliding scale coverage while hospitalized  Acute on chronic respiratory failure with hypoxia and hypercapnia (HCC)  Patient admitted with acute on chronic respiratory failure with hypoxia as evidenced by requiring 5 L supplemental oxygen on admission and tachypnea and respiratory distress  Chronically maintained on 4 L/min of oxygen  See plan above, wean as tolerated  Chronic diastolic heart failure (HCC)  Wt Readings from Last 3 Encounters:   07/14/25 123 kg (271 lb)   06/03/25 122 kg (268 lb)   05/20/25 122 kg (268 lb 12.8 oz)   EF 65%, grade 2 diastolic dysfunction  Restarted on Bumex and Aldactone  Monitor volume status  Longstanding persistent  Pt requested results, not yet dictated by Dr. Jacky Choudhury. Pt voiced understanding and will await a call when results have been read. Pt will have granddaughter who is a practicing PA remove stitches at the 10 day betsey. Pt voiced understanding. Denies any more questions or concerns. "atrial fibrillation (HCC)  Resume home Eliquis  Continue Toprol-XL and Cardizem  Pulmonary hypertension (HCC)  Noted history, follows with pulmonary medicine  Sepsis (HCC)  Patient with sepsis as evidenced by tachypnea, tachycardia and leukocytosis secondary to COPD exacerbation  Continue on IV Rocephin  Lactate is 1.7  Procalcitonin x 2 is negative.  DC Rocephin.  Blood cultures are negative to date    Labs & Imaging: Results Review Statement: No pertinent imaging studies reviewed.    VTE Prophylaxis: in place.    Code Status:   Level 1 - Full Code    Patient Centered Rounds: I have performed bedside rounds with nursing staff today.    Mobility:   Basic Mobility Inpatient Raw Score: 23  JH-HLM Goal: 7: Walk 25 feet or more  JH-HLM Achieved: 7: Walk 25 feet or more  JH-HLM Goal achieved. Continue to encourage appropriate mobility.    Discussions with Specialists or Other Care Team Provider: Pulmonary    Education and Discussions with Family / Patient: Patient will update family.    Total Time Spent on Date of Encounter in care of patient: 35 mins. This time was spent on one or more of the following: performing physical exam; counseling and coordination of care; obtaining or reviewing history; documenting in the medical record; reviewing/ordering tests, medications or procedures; communicating with other healthcare professionals and discussing with patient's family/caregivers.    Current Length of Stay: 2 day(s)    Current Patient Status: Inpatient   Certification Statement: The patient will continue to require additional inpatient hospital stay due to see my assessment and plan.     Subjective:   Patient is seen and examined at bedside.  Feels better.  No other complaints.  Afebrile  All other ROS are negative.    Objective:    Vitals: Blood pressure 111/60, pulse 85, temperature 98.3 °F (36.8 °C), temperature source Oral, resp. rate 18, height 5' 3\" (1.6 m), weight 123 kg (271 lb), SpO2 93%, not currently " breastfeeding.,Body mass index is 48.01 kg/m².  SPO2 RA Rest      Flowsheet Row ED to Hosp-Admission (Current) from 7/13/2025 in [facility]   SpO2 93 %   SpO2 Activity At Rest   O2 Device Nasal cannula   O2 Flow Rate 4 L/min          I&O:   Intake/Output Summary (Last 24 hours) at 7/16/2025 0936  Last data filed at 7/16/2025 0823  Gross per 24 hour   Intake 2686 ml   Output 4875 ml   Net -2189 ml       Physical Exam:    General- Alert, sitting in chair. Not in any acute distress.  Neck- Supple, No JVD  CVS- regular, S1 and S2 normal  Chest- Bilateral Air entry, No rhochi, crackles or wheezing present.  Abdomen- soft, nontender, not distended, no guarding or rigidity, BS+  Extremities-  No pedal edema, No calf tenderness  CNS-   Alert, awake and orientedx3. No focal deficits present.    Invasive Devices       Peripheral Intravenous Line  Duration             Peripheral IV 07/16/25 Left;Dorsal (posterior) Wrist <1 day                          Social History  reviewed  Family History[1] reviewed    Meds:  Current Medications[2]     Medications Prior to Admission:     acetaminophen (TYLENOL) 650 mg CR tablet    albuterol (2.5 mg/3 mL) 0.083 % nebulizer solution    albuterol (PROVENTIL HFA,VENTOLIN HFA) 90 mcg/act inhaler    apixaban (Eliquis) 5 mg    arformoterol (BROVANA) 15 mcg/2 mL nebulizer solution    atorvastatin (LIPITOR) 40 mg tablet    Blood Glucose Monitoring Suppl (Contour Next One) AILYN    budesonide (Pulmicort) 1 MG/2ML nebulizer solution    bumetanide (BUMEX) 2 mg tablet    Contour Next Test test strip    [employer] Lancets Thin 26G MISC    dapagliflozin (Farxiga) 10 MG tablet    diltiazem (CARDIZEM CD) 120 mg 24 hr capsule    DULoxetine (CYMBALTA) 60 mg delayed release capsule    Easy Comfort Pen Needles 33G X 4 MM MISC    EPINEPHrine (EPIPEN) 0.3 mg/0.3 mL SOAJ    Ferrous Sulfate 300 MG/6.8ML SOLN    fluticasone (FLONASE) 50 mcg/act nasal spray    Insulin Glargine Solostar  (Lantus SoloStar) 100 UNIT/ML SOPN    insulin lispro (HumaLOG KwikPen) 100 units/mL injection pen    Insulin Pen Needle (BD Pen Needle Love 2nd Gen) 32G X 4 MM MISC    levalbuterol (XOPENEX) 1.25 mg/0.5 mL nebulizer solution    loratadine (CLARITIN) 10 mg tablet    metoprolol succinate (TOPROL-XL) 100 mg 24 hr tablet    montelukast (SINGULAIR) 10 mg tablet    naloxone (NARCAN) 4 mg/0.1 mL nasal spray    nystatin (MYCOSTATIN) powder    omeprazole (PriLOSEC) 40 MG capsule    pregabalin (LYRICA) 150 mg capsule    Revefenacin (Yupelri) 175 MCG/3ML SOLN    spironolactone (ALDACTONE) 50 mg tablet    Tirzepatide (Mounjaro) 7.5 MG/0.5ML SOAJ    traZODone (DESYREL) 150 mg tablet    Vitamin D, Cholecalciferol, 50 MCG (2000 UT) CAPS    Labs:  Results from last 7 days   Lab Units 07/16/25  0438 07/15/25  0612 07/14/25  0811 07/13/25  1841   WBC Thousand/uL 14.61* 13.19* 13.88* 13.72*   HEMOGLOBIN g/dL 8.3* 8.2* 8.4* 9.0*   HEMATOCRIT % 30.6* 30.4* 30.6* 33.5*   PLATELETS Thousands/uL 360 326 350 398*   SEGS PCT %  --  92* 94* 76*   LYMPHO PCT % 7* 4* 4* 15   MONO PCT % 0* 3* 1* 8   EOS PCT % 0 0 0 0     Results from last 7 days   Lab Units 07/16/25  0438 07/15/25  0612 07/14/25  0811 07/13/25  1841   POTASSIUM mmol/L 4.5 4.5 4.7 4.2   CHLORIDE mmol/L 89* 93* 92* 95*   CO2 mmol/L 34* 31 31 33*   BUN mg/dL 39* 29* 21 16   CREATININE mg/dL 0.85 0.77 0.82 1.45*   CALCIUM mg/dL 8.9 9.0 8.6 8.8   ALK PHOS U/L  --   --   --  114*   ALT U/L  --   --   --  16   AST U/L  --   --   --  21     Lab Results   Component Value Date    TROPONINI <0.02 06/03/2021    TROPONINI <0.02 06/03/2021    TROPONINI <0.02 06/03/2021    CKTOTAL 39 09/07/2017         Lab Results   Component Value Date    BLOODCX No Growth at 48 hrs. 07/13/2025    BLOODCX No Growth at 48 hrs. 07/13/2025    BLOODCX No Growth After 5 Days. 03/08/2024    BLOODCX No Growth After 5 Days. 03/08/2024    URINECX 40,000-49,000 cfu/ml 02/26/2024    SPUTUMCULTUR 2+ Growth of 05/21/2020     SPUTUMCULTUR  05/21/2020     Commensal respiratory nicholas only; No significant growth of Staph aureus/MRSA or Pseudomonas aeruginosa.         Imaging:  Results for orders placed during the hospital encounter of 03/08/24    XR chest 1 view portable    Narrative  XR CHEST PORTABLE    INDICATION: Chronic shortness of breath with exertion. History of atrial fibrillation.    COMPARISON: Chest radiographs 2/26/2024, 1/22/2024, CT chest, abdomen pelvis 6/3/2023    FINDINGS:  Monitoring leads and clips project over the chest.    Prominent pulmonary vasculature. No focal consolidation.    No pneumothorax or pleural effusion.    Enlarged cardiac silhouette, unchanged.    Bones are unremarkable for age.    Normal upper abdomen.    Impression  Mild pulmonary venous congestion, similar to previous study.          Resident: CHRISTINA RODNEY I, the attending radiologist, have reviewed the images and agree with the final report above.    Workstation performed: [id number]    Results for orders placed during the hospital encounter of 07/13/25    X-ray chest 2 views    Narrative  XR CHEST PA AND LATERAL    INDICATION: sob.    COMPARISON: CXR 8/3/2024, subsequent chest CT 7/13/2025.    FINDINGS:    Mild pulmonary venous congestion. No pneumothorax or pleural effusion.    Mild cardiomegaly. Pulmonary artery enlargement.    Bones are unremarkable for age.    Normal upper abdomen.    Impression  Mild pulmonary venous congestion    Pulmonary artery enlargement compatible with history of pulmonary hypertension.    See subsequent chest CT.        Workstation performed: [id number]      Last 24 Hours Medication List:   Current Facility-Administered Medications   Medication Dose Route Frequency Provider Last Rate    acetaminophen  650 mg Oral Q6H PRN Ko Tovar PA-C      apixaban  5 mg Oral BID Ko Tovar PA-C      Artificial Tears  1 drop Both Eyes Q4H PRN Luis Alberto Garcia MD      atorvastatin  40 mg Oral Daily Ko  ROSALIO Tovar      benzonatate  100 mg Oral TID PRN Ko Tovar PA-C      budesonide  0.5 mg Nebulization Q12H Ko Tovar PA-C      bumetanide  6 mg Oral BID Luis Alberto Garcia MD      diltiazem  120 mg Oral Daily Ko Tovar PA-C      DULoxetine  60 mg Oral Daily Ko Tovar PA-C      formoterol  20 mcg Nebulization Q12H Ko Tovar PA-C      insulin glargine  40 Units Subcutaneous HS Ko Tovar PA-C      insulin lispro  20 Units Subcutaneous TID With Meals Ko Tovar PA-C      insulin lispro  4-20 Units Subcutaneous TID AC Luis Alberto Garcia MD      insulin lispro  4-20 Units Subcutaneous HS Luis Alberto Garcia MD      ipratropium  0.5 mg Nebulization TID Luis Alberto Garcia MD      levalbuterol  1.25 mg Nebulization Q6H PRN Ko Tovar PA-C      And    sodium chloride  3 mL Nebulization Q6H PRN Ko Tovar PA-C      levalbuterol  1.25 mg Nebulization TID Luis Alberto Gacria MD      loratadine  10 mg Oral Daily Luis Alberto Garcia MD      methylPREDNISolone sodium succinate  40 mg Intravenous Q12H [facility] VICKY Hernandez      metoprolol succinate  100 mg Oral Daily Ko Tovar PA-C      montelukast  10 mg Oral HS Ko Tovar PA-C      ondansetron  4 mg Intravenous Q6H PRN Ko Tovar PA-C      pantoprazole  40 mg Oral Early Morning Ko Tovar PA-C      pregabalin  150 mg Oral Daily Ko Tovar PA-C      spironolactone  100 mg Oral Daily Luis Alberto Garcia MD      traZODone  150 mg Oral HS Ko Tovar PA-C          Today, Patient Was Seen By: Luis Alberto Garcia MD    ** Please Note: Dictation voice to text software may have been used in the creation of this document. **             [1]   Family History  Problem Relation Name Age of Onset    Alzheimer's disease Mother      Atrial fibrillation Mother      Dementia Mother      Heart disease Mother          heart problem    Seizures Mother      Parkinsonism Father      Arthritis Father      Atrial  fibrillation Father      No Known Problems Daughter mark     Cri-du-chat syndrome Daughter peña     Colon cancer Maternal Grandmother  80    Diabetes type II Maternal Grandmother      No Known Problems Brother      No Known Problems Son digna     Substance Abuse Neg Hx          mother,father    Mental illness Neg Hx          mother,father    Colon polyps Neg Hx     [2]   Current Facility-Administered Medications   Medication Dose Route Frequency Provider Last Rate Last Admin    acetaminophen (TYLENOL) tablet 650 mg  650 mg Oral Q6H PRN Ko Tovar PA-C   650 mg at 07/15/25 2135    apixaban (ELIQUIS) tablet 5 mg  5 mg Oral BID Ko Tovar PA-C   5 mg at 07/16/25 0839    Artificial Tears Op Soln 1 drop  1 drop Both Eyes Q4H PRN Luis Alberto Garcia MD   1 drop at 07/16/25 0845    atorvastatin (LIPITOR) tablet 40 mg  40 mg Oral Daily Ko Tovar PA-C   40 mg at 07/16/25 0838    benzonatate (TESSALON PERLES) capsule 100 mg  100 mg Oral TID PRN Ko Tovar PA-C        budesonide (PULMICORT) inhalation solution 0.5 mg  0.5 mg Nebulization Q12H Ko Tovar PA-C   0.5 mg at 07/16/25 0709    bumetanide (BUMEX) tablet 6 mg  6 mg Oral BID Luis Alberto Garcia MD   6 mg at 07/16/25 0839    diltiazem (CARDIZEM CD) 24 hr capsule 120 mg  120 mg Oral Daily Ko Tovar PA-C   120 mg at 07/16/25 0839    DULoxetine (CYMBALTA) delayed release capsule 60 mg  60 mg Oral Daily Ko Tovar PA-C   60 mg at 07/16/25 0838    formoterol (PERFOROMIST) nebulizer solution 20 mcg  20 mcg Nebulization Q12H Ko Tovar PA-C   20 mcg at 07/16/25 0709    insulin glargine (LANTUS) subcutaneous injection 40 Units 0.4 mL  40 Units Subcutaneous HS Ko Tovar PA-C   40 Units at 07/15/25 2134    insulin lispro (HumALOG/ADMELOG) 100 units/mL subcutaneous injection 20 Units  20 Units Subcutaneous TID With Meals Ko Tovar PA-C   20 Units at 07/16/25 0841    insulin lispro (HumALOG/ADMELOG) 100 units/mL  subcutaneous injection 4-20 Units  4-20 Units Subcutaneous TID AC Luis Alberto Garcia MD   12 Units at 07/16/25 0842    insulin lispro (HumALOG/ADMELOG) 100 units/mL subcutaneous injection 4-20 Units  4-20 Units Subcutaneous HS Luis Alberto Garcia MD   20 Units at 07/15/25 2132    ipratropium (ATROVENT) 0.02 % inhalation solution 0.5 mg  0.5 mg Nebulization TID Luis Alberto Garcia MD   0.5 mg at 07/16/25 0709    levalbuterol (XOPENEX) inhalation solution 1.25 mg  1.25 mg Nebulization Q6H PRN Ko Tovar PA-C        And    sodium chloride 0.9 % inhalation solution 3 mL  3 mL Nebulization Q6H PRN Ko Tovar PA-C        levalbuterol (XOPENEX) inhalation solution 1.25 mg  1.25 mg Nebulization TID Luis Alberto Garcia MD   1.25 mg at 07/16/25 0709    loratadine (CLARITIN) tablet 10 mg  10 mg Oral Daily Luis Alberto Garcia MD   10 mg at 07/16/25 0838    methylPREDNISolone sodium succinate (Solu-MEDROL) injection 40 mg  40 mg Intravenous Q12H [facility] VICKY Hernandez   40 mg at 07/16/25 0838    metoprolol succinate (TOPROL-XL) 24 hr tablet 100 mg  100 mg Oral Daily Ko Tovar PA-C   100 mg at 07/16/25 0838    montelukast (SINGULAIR) tablet 10 mg  10 mg Oral HS Ko Tovar PA-C   10 mg at 07/15/25 2135    ondansetron (ZOFRAN) injection 4 mg  4 mg Intravenous Q6H PRN Ko Tovar PA-C        pantoprazole (PROTONIX) EC tablet 40 mg  40 mg Oral Early Morning Ko Tovar PA-C   40 mg at 07/16/25 0556    pregabalin (LYRICA) capsule 150 mg  150 mg Oral Daily Ko Tovar PA-C   150 mg at 07/16/25 0838    spironolactone (ALDACTONE) tablet 100 mg  100 mg Oral Daily Luis Alberto Garcia MD   100 mg at 07/16/25 0838    traZODone (DESYREL) tablet 150 mg  150 mg Oral HS Ko Tovar PA-C   150 mg at 07/15/25 0471

## 2025-07-16 NOTE — PROGRESS NOTES
Progress Note - Pulmonology   Name: Mattie Joy 64 y.o. female I MRN: [id number]  Unit/Bed#: -01 I Date of Admission: 7/13/2025   Date of Service: 7/16/2025 I Hospital Day: 2    Assessment & Plan  Acute exacerbation of asthma/COPD overlap syndrome  Exacerbation is related to her not being able to use any medications for the past month.  She states she was too tired to even use nebulizers.    Plan:  Complete q12h today and transition to prednisone 40mg on 7/15  Use all nebulized regimen of Pulmicort/Perforomist twice daily, Xopenex/Atrovent - TID  Continue singulair daily    At time of discharge would likely benefit from outpatient pulmonary rehab if she does not require inpatient rehab.  Acute on chronic respiratory failure with hypoxia and hypercapnia (HCC)  Continue supplemental oxygen to maintain sats greater than 88%  Continue nocturnal BiPAP  Pulmonary hypertension (HCC)  Euvolemic    24 Hour Events : No acute events; feeling slightly better.  Subjective : Patient is resting comfort.  She has no complaints feel he feels her breathing is doing better.    Objective :  Temp:  [96.8 °F (36 °C)-98.6 °F (37 °C)] 98.6 °F (37 °C)  HR:  [] 75  BP: ()/(57-78) 94/57  Resp:  [18-22] 18  SpO2:  [94 %-100 %] 95 %  O2 Device: Nasal cannula  Nasal Cannula O2 Flow Rate (L/min):  [4 L/min] 4 L/min  FiO2 (%):  [50] 50    Physical Exam  Vitals and nursing note reviewed.   Constitutional:       General: She is not in acute distress.     Appearance: She is well-developed. She is not diaphoretic.   HENT:      Head: Normocephalic and atraumatic.     Eyes:      Pupils: Pupils are equal, round, and reactive to light.       Cardiovascular:      Rate and Rhythm: Normal rate and regular rhythm.     Musculoskeletal:      Cervical back: Normal range of motion and neck supple.     Skin:     General: Skin is warm and dry.      Findings: No erythema.     Neurological:      General: No focal deficit present.      Mental  Status: She is oriented to person, place, and time.         Lab Results: I have reviewed the following results:   .     07/16/25  0438   WBC 14.61*   HGB 8.3*   HCT 30.6*      SODIUM 130*   K 4.5   CL 89*   CO2 34*   BUN 39*   CREATININE 0.85   GLUC 248*     ABG: No new results in last 24 hours.    Imaging Results Review: I reviewed radiology reports from this admission including: chest xray.  Other Study Results Review: EKG was reviewed.   PFT Results Reviewed: not reviewed.

## 2025-07-16 NOTE — PLAN OF CARE
Problem: Potential for Falls  Goal: Patient will remain free of falls  Description: INTERVENTIONS:  - Educate patient/family on patient safety including physical limitations  - Instruct patient to call for assistance with activity   - Consider consulting OT/PT to assist with strengthening/mobility based on AM PAC & JH-HLM score  - Consult OT/PT to assist with strengthening/mobility   - Keep Call bell within reach  - Keep bed low and locked with side rails adjusted as appropriate  - Keep care items and personal belongings within reach  - Initiate and maintain comfort rounds  - Make Fall Risk Sign visible to staff  - Offer Toileting every 2 Hours, in advance of need  - Initiate/Maintain bed/chair alarm  - Obtain necessary fall risk management equipment: yellow socks, yellow braceket  - Apply yellow socks and bracelet for high fall risk patients  - Consider moving patient to room near nurses station  Outcome: Progressing     Problem: PAIN - ADULT  Goal: Verbalizes/displays adequate comfort level or baseline comfort level  Description: Interventions:  - Encourage patient to monitor pain and request assistance  - Assess pain using appropriate pain scale  - Administer analgesics as ordered based on type and severity of pain and evaluate response  - Implement non-pharmacological measures as appropriate and evaluate response  - Consider cultural and social influences on pain and pain management  - Notify physician/advanced practitioner if interventions unsuccessful or patient reports new pain  - Educate patient/family on pain management process including their role and importance of  reporting pain   - Provide non-pharmacologic/complimentary pain relief interventions  Outcome: Progressing     Problem: SAFETY ADULT  Goal: Patient will remain free of falls  Description: INTERVENTIONS:  - Educate patient/family on patient safety including physical limitations  - Instruct patient to call for assistance with activity   -  Consider consulting OT/PT to assist with strengthening/mobility based on AM PAC & JH-HLM score  - Consult OT/PT to assist with strengthening/mobility   - Keep Call bell within reach  - Keep bed low and locked with side rails adjusted as appropriate  - Keep care items and personal belongings within reach  - Initiate and maintain comfort rounds  - Make Fall Risk Sign visible to staff  - Offer Toileting every 2 Hours, in advance of need  - Initiate/Maintain bed/chair alarm  - Obtain necessary fall risk management equipment: yellow socks, yellow bracelet  - Apply yellow socks and bracelet for high fall risk patients  - Consider moving patient to room near nurses station  Outcome: Progressing     Problem: Knowledge Deficit  Goal: Patient/family/caregiver demonstrates understanding of disease process, treatment plan, medications, and discharge instructions  Description: Complete learning assessment and assess knowledge base.  Interventions:  - Provide teaching at level of understanding  - Provide teaching via preferred learning methods  Outcome: Progressing     Problem: RESPIRATORY - ADULT  Goal: Achieves optimal ventilation and oxygenation  Description: INTERVENTIONS:  - Assess for changes in respiratory status  - Assess for changes in mentation and behavior  - Position to facilitate oxygenation and minimize respiratory effort  - Oxygen administered by appropriate delivery if ordered  - Initiate smoking cessation education as indicated  - Encourage broncho-pulmonary hygiene including cough, deep breathe, Incentive Spirometry  - Assess the need for suctioning and aspirate as needed  - Assess and instruct to report SOB or any respiratory difficulty  - Respiratory Therapy support as indicated  Outcome: Progressing

## 2025-07-17 VITALS
TEMPERATURE: 98.3 F | HEART RATE: 84 BPM | SYSTOLIC BLOOD PRESSURE: 114 MMHG | BODY MASS INDEX: 48.02 KG/M2 | DIASTOLIC BLOOD PRESSURE: 60 MMHG | RESPIRATION RATE: 17 BRPM | HEIGHT: 63 IN | OXYGEN SATURATION: 97 % | WEIGHT: 271 LBS

## 2025-07-17 LAB
ANION GAP SERPL CALCULATED.3IONS-SCNC: 8 MMOL/L (ref 4–13)
BUN SERPL-MCNC: 33 MG/DL (ref 5–25)
CALCIUM SERPL-MCNC: 8.6 MG/DL (ref 8.4–10.2)
CHLORIDE SERPL-SCNC: 88 MMOL/L (ref 96–108)
CO2 SERPL-SCNC: 35 MMOL/L (ref 21–32)
CREAT SERPL-MCNC: 0.75 MG/DL (ref 0.6–1.3)
ERYTHROCYTE [DISTWIDTH] IN BLOOD BY AUTOMATED COUNT: 22 % (ref 11.6–15.1)
GFR SERPL CREATININE-BSD FRML MDRD: 83 ML/MIN/1.73SQ M
GLUCOSE SERPL-MCNC: 299 MG/DL (ref 65–140)
GLUCOSE SERPL-MCNC: 371 MG/DL (ref 65–140)
GLUCOSE SERPL-MCNC: 395 MG/DL (ref 65–140)
GLUCOSE SERPL-MCNC: 469 MG/DL (ref 65–140)
HCT VFR BLD AUTO: 29.7 % (ref 34.8–46.1)
HGB BLD-MCNC: 8.3 G/DL (ref 11.5–15.4)
MCH RBC QN AUTO: 16.8 PG (ref 26.8–34.3)
MCHC RBC AUTO-ENTMCNC: 27.9 G/DL (ref 31.4–37.4)
MCV RBC AUTO: 60 FL (ref 82–98)
NRBC BLD AUTO-RTO: 0 /100 WBCS
PLATELET # BLD AUTO: 332 THOUSANDS/UL (ref 149–390)
POTASSIUM SERPL-SCNC: 4.4 MMOL/L (ref 3.5–5.3)
RBC # BLD AUTO: 4.93 MILLION/UL (ref 3.81–5.12)
SODIUM SERPL-SCNC: 131 MMOL/L (ref 135–147)
WBC # BLD AUTO: 13.6 THOUSAND/UL (ref 4.31–10.16)

## 2025-07-17 PROCEDURE — 99232 SBSQ HOSP IP/OBS MODERATE 35: CPT

## 2025-07-17 PROCEDURE — 85027 COMPLETE CBC AUTOMATED: CPT | Performed by: INTERNAL MEDICINE

## 2025-07-17 PROCEDURE — 82948 REAGENT STRIP/BLOOD GLUCOSE: CPT

## 2025-07-17 PROCEDURE — 80048 BASIC METABOLIC PNL TOTAL CA: CPT | Performed by: INTERNAL MEDICINE

## 2025-07-17 PROCEDURE — 94640 AIRWAY INHALATION TREATMENT: CPT

## 2025-07-17 PROCEDURE — 94760 N-INVAS EAR/PLS OXIMETRY 1: CPT

## 2025-07-17 PROCEDURE — 99239 HOSP IP/OBS DSCHRG MGMT >30: CPT | Performed by: INTERNAL MEDICINE

## 2025-07-17 RX ORDER — PREDNISONE 10 MG/1
TABLET ORAL
Qty: 30 TABLET | Refills: 0 | Status: SHIPPED | OUTPATIENT
Start: 2025-07-17 | End: 2025-07-29

## 2025-07-17 RX ORDER — PREDNISONE 20 MG/1
40 TABLET ORAL DAILY
Status: DISCONTINUED | OUTPATIENT
Start: 2025-07-18 | End: 2025-07-17 | Stop reason: HOSPADM

## 2025-07-17 RX ADMIN — INSULIN LISPRO 16 UNITS: 100 INJECTION, SOLUTION INTRAVENOUS; SUBCUTANEOUS at 07:50

## 2025-07-17 RX ADMIN — METOPROLOL SUCCINATE 100 MG: 50 TABLET, EXTENDED RELEASE ORAL at 08:00

## 2025-07-17 RX ADMIN — ATORVASTATIN CALCIUM 40 MG: 40 TABLET, FILM COATED ORAL at 08:01

## 2025-07-17 RX ADMIN — DILTIAZEM HYDROCHLORIDE 120 MG: 120 CAPSULE, COATED, EXTENDED RELEASE ORAL at 08:00

## 2025-07-17 RX ADMIN — LEVALBUTEROL HYDROCHLORIDE 1.25 MG: 1.25 SOLUTION RESPIRATORY (INHALATION) at 09:06

## 2025-07-17 RX ADMIN — DULOXETINE 60 MG: 30 CAPSULE, DELAYED RELEASE ORAL at 08:00

## 2025-07-17 RX ADMIN — INSULIN LISPRO 25 UNITS: 100 INJECTION, SOLUTION INTRAVENOUS; SUBCUTANEOUS at 11:54

## 2025-07-17 RX ADMIN — INSULIN LISPRO 20 UNITS: 100 INJECTION, SOLUTION INTRAVENOUS; SUBCUTANEOUS at 16:55

## 2025-07-17 RX ADMIN — INSULIN LISPRO 20 UNITS: 100 INJECTION, SOLUTION INTRAVENOUS; SUBCUTANEOUS at 11:54

## 2025-07-17 RX ADMIN — INSULIN LISPRO 25 UNITS: 100 INJECTION, SOLUTION INTRAVENOUS; SUBCUTANEOUS at 16:56

## 2025-07-17 RX ADMIN — LORATADINE 10 MG: 10 TABLET ORAL at 08:00

## 2025-07-17 RX ADMIN — IPRATROPIUM BROMIDE 0.5 MG: 0.5 SOLUTION RESPIRATORY (INHALATION) at 13:56

## 2025-07-17 RX ADMIN — METHYLPREDNISOLONE SODIUM SUCCINATE 40 MG: 40 INJECTION, POWDER, FOR SOLUTION INTRAMUSCULAR; INTRAVENOUS at 08:00

## 2025-07-17 RX ADMIN — BUMETANIDE 6 MG: 1 TABLET ORAL at 08:00

## 2025-07-17 RX ADMIN — APIXABAN 5 MG: 5 TABLET, FILM COATED ORAL at 08:00

## 2025-07-17 RX ADMIN — BUDESONIDE 0.5 MG: 0.5 INHALANT RESPIRATORY (INHALATION) at 09:06

## 2025-07-17 RX ADMIN — IPRATROPIUM BROMIDE 0.5 MG: 0.5 SOLUTION RESPIRATORY (INHALATION) at 09:06

## 2025-07-17 RX ADMIN — PREGABALIN 150 MG: 100 CAPSULE ORAL at 08:00

## 2025-07-17 RX ADMIN — FORMOTEROL FUMARATE 20 MCG: 20 SOLUTION RESPIRATORY (INHALATION) at 09:08

## 2025-07-17 RX ADMIN — SPIRONOLACTONE 100 MG: 25 TABLET ORAL at 08:00

## 2025-07-17 RX ADMIN — DEXTRAN 70, GLYCERIN, HYPROMELLOSE 1 DROP: 1; 2; 3 SOLUTION/ DROPS OPHTHALMIC at 07:59

## 2025-07-17 RX ADMIN — LEVALBUTEROL HYDROCHLORIDE 1.25 MG: 1.25 SOLUTION RESPIRATORY (INHALATION) at 13:56

## 2025-07-17 RX ADMIN — INSULIN LISPRO 25 UNITS: 100 INJECTION, SOLUTION INTRAVENOUS; SUBCUTANEOUS at 07:51

## 2025-07-17 RX ADMIN — PANTOPRAZOLE SODIUM 40 MG: 40 TABLET, DELAYED RELEASE ORAL at 06:28

## 2025-07-17 NOTE — DISCHARGE SUMMARY
Discharge Summary - Hospitalist   Name: Mattie Joy 64 y.o. female I MRN: [id number]  Unit/Bed#: -01 I Date of Admission: 7/13/2025   Date of Service: 7/17/2025 I Hospital Day: 3     Assessment & Plan  Acute exacerbation of asthma/COPD overlap syndrome  Chief complaint of worsening shortness of breath, weakness.  Was not using her nebulizers at home as previously ordered.  Found to be in COPD flare, hypoxic in the ER requiring 5 L/min.  Resume Pulmicort, Perforomist  Resume Singulair  Xopenex/Atrovent as needed  On Solu-Medrol every 12 hours  Pulmonary follow-up  Pulmonary recommended patient can be discharged on prednisone 40 mg daily and taper by 10 mg every 3 days and outpatient follow-up with them.  Will need outpatient pulmonary follow-up at rehab  IVELISSE (acute kidney injury) (HCC)  Mild IVELISSE with creatinine 1.4 on admission, baseline around 0.7  Hold nephrotoxins, spironolactone, Bumex  Hold IV fluids in the setting of diastolic heart failure  Restarted on diuretics 7/15/2025  Resolved  Type 2 diabetes mellitus with stage 2 chronic kidney disease, with long-term current use of insulin ([facility])  Lab Results   Component Value Date    HGBA1C 8.8 (A) 04/22/2025       Recent Labs     07/16/25  1126 07/16/25  1608 07/16/25  2034 07/17/25  0712   POCGLU 432* 405* 362* 371*       Blood Sugar Average: Last 72 hrs:  (P) 379.25Resume home Lantus 40 units nightly  Resume Humalog 20 units daily with meals  Sliding scale coverage while hospitalized  Acute on chronic respiratory failure with hypoxia and hypercapnia (HCC)  Patient admitted with acute on chronic respiratory failure with hypoxia as evidenced by requiring 5 L supplemental oxygen on admission and tachypnea and respiratory distress  Chronically maintained on 4 L/min of oxygen  See plan above, wean as tolerated  Chronic diastolic heart failure (HCC)  Wt Readings from Last 3 Encounters:   07/14/25 123 kg (271 lb)   06/03/25 122 kg (268 lb)   05/20/25 122 kg (268 lb  12.8 oz)   EF 65%, grade 2 diastolic dysfunction  Restarted on Bumex and Aldactone  Monitor volume status  Longstanding persistent atrial fibrillation (HCC)  Resume home Eliquis  Continue Toprol-XL and Cardizem  Pulmonary hypertension (HCC)  Noted history, follows with pulmonary medicine  Sepsis ([facility])  Patient with sepsis as evidenced by tachypnea, tachycardia and leukocytosis secondary to COPD exacerbation  Continue on IV Rocephin  Lactate is 1.7  Procalcitonin x 2 is negative.  DC Rocephin.  Blood cultures are negative to date  Stable at discharge   Hospital Course:     Mattie Joy is a 64 y.o. female patient who originally presented to the hospital on   Admission Orders (From admission, onward)       Ordered        07/14/25 0032  INPATIENT ADMISSION  Once                         due to sepsis secondary to COPD exacerbation, acute on chronic respiratory failure with hypoxia.  Patient was started on Solu-Medrol and Rocephin.  Patient was seen by pulmonary and initially diuretics were held and kidney function is back to baseline and patient was restarted on Bumex and spironolactone.  Patient is feeling much better and is on baseline oxygen requirement at discharge.  Patient is cleared by pulmonary for discharge with tapering dose of prednisone as above.  Patient is hemodynamically stable for discharge  On Exam-  Chest-bilateral air entry, clear to auscultation  Abdomen-soft, nontender  Heart-S1 S2 regular  Extremities-no pedal edema or calf tenderness  Neuro-alert awake oriented x3.  No focal deficits    Please see above list of diagnoses and related plan for additional information.     CONSULTING PROVIDERS   IP CONSULT TO PULMONOLOGY    PROCEDURES PERFORMED  * No surgery found *    RADIOLOGY RESULTS  X-ray chest 2 views  Result Date: 7/14/2025  Narrative: XR CHEST PA AND LATERAL INDICATION: sob. COMPARISON: CXR 8/3/2024, subsequent chest CT 7/13/2025. FINDINGS: Mild pulmonary venous congestion. No pneumothorax or  pleural effusion. Mild cardiomegaly. Pulmonary artery enlargement. Bones are unremarkable for age. Normal upper abdomen.     Impression: Mild pulmonary venous congestion Pulmonary artery enlargement compatible with history of pulmonary hypertension. See subsequent chest CT. Workstation performed: [id number]     CTA chest pe study  Result Date: 7/14/2025  Narrative: CTA - CHEST WITH IV CONTRAST - PULMONARY ANGIOGRAM INDICATION: sob, copd. COMPARISON: CT chest abdomen pelvis dated 6/3/2023. TECHNIQUE: CTA examination of the chest was performed using angiographic technique according to a protocol specifically tailored to evaluate for pulmonary embolism. Multiplanar 2D reformatted images were created from the source data. In addition, coronal  3D MIP postprocessing was performed on the acquisition scanner. Radiation dose length product (DLP) for this visit: 627.04 mGy-cm. . This examination, like all CT scans performed in the [facility], was performed utilizing techniques to minimize radiation dose exposure, including the use of iterative reconstruction and automated exposure control. IV Contrast: 85 mL of iohexol (OMNIPAQUE) FINDINGS: PULMONARY ARTERIAL TREE: No pulmonary embolus. Main pulmonary arterial enlargement to 4.3 cm.  Redemonstrated subcentimeter pulmonary nodules. LUNGS: Mosaic attenuation pattern of the lungs redemonstrated. PLEURA: Unremarkable. HEART/GREAT VESSELS: Heart is unremarkable for patient's age. No thoracic aortic aneurysm. MEDIASTINUM AND SHANTA: Unremarkable. CHEST WALL AND LOWER NECK: Unremarkable. VISUALIZED STRUCTURES IN THE UPPER ABDOMEN: Gastric bypass. Cholecystectomy OSSEOUS STRUCTURES: No acute fracture or destructive osseous lesion.     Impression: No pulmonary embolus. Main pulmonary arterial enlargement suggestive of pulmonary arterial hypertension. Mosaic attenuation pattern of the lungs redemonstrated again suggestive of air trapping. Computerized Assisted Algorithm  (CAA) may have aided analysis of applicable images. Workstation performed: [id number]       LABS  Results from last 7 days   Lab Units 07/17/25  0252 07/16/25  0438 07/15/25  0612 07/14/25  0811 07/13/25  1841   WBC Thousand/uL 13.60* 14.61* 13.19* 13.88* 13.72*   HEMOGLOBIN g/dL 8.3* 8.3* 8.2* 8.4* 9.0*   HEMATOCRIT % 29.7* 30.6* 30.4* 30.6* 33.5*   MCV fL 60* 61* 61* 60* 62*   TOTAL NEUT ABS Thousand/uL  --  13.59*  --   --   --    PLATELETS Thousands/uL 332 360 326 350 398*     Results from last 7 days   Lab Units 07/17/25  0252 07/16/25  0438 07/15/25  0612 07/14/25  0811 07/13/25  1841   SODIUM mmol/L 131* 130* 131* 132* 134*   POTASSIUM mmol/L 4.4 4.5 4.5 4.7 4.2   CHLORIDE mmol/L 88* 89* 93* 92* 95*   CO2 mmol/L 35* 34* 31 31 33*   BUN mg/dL 33* 39* 29* 21 16   CREATININE mg/dL 0.75 0.85 0.77 0.82 1.45*   CALCIUM mg/dL 8.6 8.9 9.0 8.6 8.8   ALBUMIN g/dL  --   --   --   --  4.4   TOTAL BILIRUBIN mg/dL  --   --   --   --  0.33   ALK PHOS U/L  --   --   --   --  114*   ALT U/L  --   --   --   --  16   AST U/L  --   --   --   --  21   EGFR ml/min/1.73sq m 83 71 80 74 37   GLUCOSE RANDOM mg/dL 299* 248* 333* 320* 196*     Results from last 7 days   Lab Units 07/13/25  2100 07/13/25  1841   HS TNI 0HR ng/L  --  4   HS TNI 2HR ng/L 4  --               Results from last 7 days   Lab Units 07/17/25  0712 07/16/25 2034 07/16/25  1608 07/16/25  1126 07/16/25  0721 07/16/25  0254 07/15/25  2127 07/15/25  1557 07/15/25  1105 07/15/25  1101   POC GLUCOSE mg/dl 371* 362* 405* 432* 285* 261* 461* 390* 486* 431*             Results from last 7 days   Lab Units 07/15/25  0612 07/14/25  0811 07/13/25  1901   LACTIC ACID mmol/L  --  1.7 1.5   PROCALCITONIN ng/ml 0.10 0.12  --            Cultures:   Results from last 7 days   Lab Units 07/13/25  1909   COLOR UA  Colorless   CLARITY UA  Clear   SPEC GRAV UA  <1.005*   PH UA  5.0   LEUKOCYTES UA  Negative   NITRITE UA  Negative   GLUCOSE UA mg/dl 1000 (1%)*   KETONES UA mg/dl  Negative   BILIRUBIN UA  Negative   BLOOD UA  Small*      Results from last 7 days   Lab Units 07/13/25  1909   RBC UA /hpf 1-2   WBC UA /hpf None Seen   EPITHELIAL CELLS WET PREP /hpf Occasional   BACTERIA UA /hpf None Seen      Results from last 7 days   Lab Units 07/13/25  1940 07/13/25  1932 07/13/25  1841   BLOOD CULTURE  No Growth at 72 hrs. No Growth at 72 hrs.  --    INFLUENZA A PCR   --   --  Negative       Condition at Discharge:  good      Discharge instructions/Information to patient and family:   See after visit summary for information provided to patient and family.      Provisions for Follow-Up Care:  See after visit summary for information related to follow-up care and any pertinent home health orders.      Disposition:     Home       Discharge Statement:  I spent 40 minutes discharging the patient. This time was spent on the day of discharge. I had direct contact with the patient on the day of discharge. Greater than 50% of the total time was spent examining patient, answering all patient questions, arranging and discussing plan of care with patient as well as directly providing post-discharge instructions.  Additional time then spent on discharge activities.    Discharge Medications:  See after visit summary for reconciled discharge medications provided to patient and family.      ** Please Note: This note has been constructed using a voice recognition system **

## 2025-07-17 NOTE — PROGRESS NOTES
Progress Note - Pulmonology   Name: Mattie Joy 64 y.o. female I MRN: [id number]  Unit/Bed#: -01 I Date of Admission: 7/13/2025   Date of Service: 7/17/2025 I Hospital Day: 3    Assessment & Plan  Acute exacerbation of asthma/COPD overlap syndrome  Exacerbation is related to her not being able to use any medications for the past month.  She states she was too tired to even use nebulizers.  - Home regimen: pulmicort neb BID, brovana neb BID, yupelri neb once daily. She can resume this regimen upon discharge    Plan:  - Transitioned to prednisone 40mg today with 10mg taper every 3 days   - Use all nebulized regimen of Pulmicort/Perforomist twice daily, Xopenex/Atrovent - TID  - Continue singulair daily  - She will need OP pulmonary follow up. Will arrange with scheduling staff.    At time of discharge would likely benefit from outpatient pulmonary rehab if she does not require inpatient rehab.  Acute on chronic respiratory failure with hypoxia and hypercapnia (HCC)  She is on her baseline 4L continuous supplemental oxygen  Continue supplemental oxygen to maintain sats greater than 88%  Continue nocturnal BiPAP  Pulmonary hypertension (HCC)  Euvolemic    Plan of care discussed with OCDI Garcia.     24 Hour Events : None  Subjective : Ivy is resting in bed with BiPAP on. She states she is feeling much better today. Notes increased cough this AM after nebulizer treatment. Feels ready to go home.    Objective :  Temp:  [98.9 °F (37.2 °C)-99 °F (37.2 °C)] 98.9 °F (37.2 °C)  HR:  [83-93] 83  BP: (100-119)/(50-69) 119/53  Resp:  [16-20] 20  SpO2:  [94 %-99 %] 95 %  O2 Device: Nasal cannula  Nasal Cannula O2 Flow Rate (L/min):  [4 L/min] 4 L/min  FiO2 (%):  [50] 50    Physical Exam  General appearance:   Alert and awake, in no acute distress  Head:   Normocephalic, without obvious abnormality, atraumatic  Eyes:   No scleral icterus   Lungs:  Pulmonary effort non labored at rest  Breath sounds: Mild scattered  expiratory wheezes. No rhonchi.  Cardiovascular:   Regular rate and rhythm. No murmurs. Capillary refill < 3 seconds.  Abdomen:    No appreciable distension or tenderness  Extremities:   No deformity. No clubbing present. No edema to extremities.   Skin:   Warm and dry. Intact. Color appropriate for ethnicity.  Neurologic:   No acute focal deficits are noted.  Psychiatric:   Normal mood. Answers questions appropriately.       Lab Results: I have reviewed the following results:   .     07/17/25  0252   WBC 13.60*   HGB 8.3*   HCT 29.7*      SODIUM 131*   K 4.4   CL 88*   CO2 35*   BUN 33*   CREATININE 0.75   GLUC 299*     ABG: No new results in last 24 hours.    Imaging Results Review: I reviewed radiology reports from this admission including: chest xray and CT chest.  Other Study Results Review: No additional pertinent studies reviewed.  PFT Results Reviewed: reviewed      Abbie Wheeler, MSN RN FNP-BC  Nurse Practitioner  [facility]

## 2025-07-17 NOTE — NURSING NOTE
Patient received COPD packet and Zone tool. Reviewed discharge instructions with patient. Removed IV. Patient left via wheelchair to front lobby, son present to transport home.

## 2025-07-18 ENCOUNTER — TRANSITIONAL CARE MANAGEMENT (OUTPATIENT)
Dept: FAMILY MEDICINE CLINIC | Facility: HOSPITAL | Age: 65
End: 2025-07-18

## 2025-07-18 DIAGNOSIS — I50.32 CHRONIC DIASTOLIC CHF (CONGESTIVE HEART FAILURE) (HCC): ICD-10-CM

## 2025-07-18 DIAGNOSIS — I48.11 LONGSTANDING PERSISTENT ATRIAL FIBRILLATION (HCC): ICD-10-CM

## 2025-07-18 RX ORDER — DILTIAZEM HYDROCHLORIDE 120 MG/1
120 CAPSULE, COATED, EXTENDED RELEASE ORAL DAILY
Qty: 90 CAPSULE | Refills: 1 | Status: SHIPPED | OUTPATIENT
Start: 2025-07-18

## 2025-07-18 RX ORDER — APIXABAN 5 MG/1
5 TABLET, FILM COATED ORAL 2 TIMES DAILY
Qty: 180 TABLET | Refills: 1 | Status: SHIPPED | OUTPATIENT
Start: 2025-07-18

## 2025-07-18 RX ORDER — DAPAGLIFLOZIN 10 MG/1
10 TABLET, FILM COATED ORAL DAILY
Qty: 90 TABLET | Refills: 1 | Status: SHIPPED | OUTPATIENT
Start: 2025-07-18

## 2025-07-19 LAB
BACTERIA BLD CULT: NORMAL
BACTERIA BLD CULT: NORMAL

## 2025-07-22 ENCOUNTER — OFFICE VISIT (OUTPATIENT)
Dept: ENDOCRINOLOGY | Facility: HOSPITAL | Age: 65
End: 2025-07-22
Payer: MEDICARE

## 2025-07-22 ENCOUNTER — TELEPHONE (OUTPATIENT)
Dept: ENDOCRINOLOGY | Facility: HOSPITAL | Age: 65
End: 2025-07-22

## 2025-07-22 ENCOUNTER — RA CDI HCC (OUTPATIENT)
Dept: OTHER | Facility: HOSPITAL | Age: 65
End: 2025-07-22

## 2025-07-22 VITALS
HEIGHT: 63 IN | BODY MASS INDEX: 48.01 KG/M2 | SYSTOLIC BLOOD PRESSURE: 114 MMHG | OXYGEN SATURATION: 93 % | HEART RATE: 104 BPM | DIASTOLIC BLOOD PRESSURE: 60 MMHG

## 2025-07-22 DIAGNOSIS — Z79.4 TYPE 2 DIABETES MELLITUS WITH HYPERGLYCEMIA, WITH LONG-TERM CURRENT USE OF INSULIN (HCC): Primary | ICD-10-CM

## 2025-07-22 DIAGNOSIS — N18.2 TYPE 2 DIABETES MELLITUS WITH STAGE 2 CHRONIC KIDNEY DISEASE, WITH LONG-TERM CURRENT USE OF INSULIN (HCC): ICD-10-CM

## 2025-07-22 DIAGNOSIS — E78.00 HYPERCHOLESTEROLEMIA: ICD-10-CM

## 2025-07-22 DIAGNOSIS — E11.22 TYPE 2 DIABETES MELLITUS WITH STAGE 2 CHRONIC KIDNEY DISEASE, WITH LONG-TERM CURRENT USE OF INSULIN (HCC): ICD-10-CM

## 2025-07-22 DIAGNOSIS — E11.65 TYPE 2 DIABETES MELLITUS WITH HYPERGLYCEMIA, WITH LONG-TERM CURRENT USE OF INSULIN (HCC): Primary | ICD-10-CM

## 2025-07-22 DIAGNOSIS — Z79.4 TYPE 2 DIABETES MELLITUS WITH STAGE 2 CHRONIC KIDNEY DISEASE, WITH LONG-TERM CURRENT USE OF INSULIN (HCC): ICD-10-CM

## 2025-07-22 PROCEDURE — 99214 OFFICE O/P EST MOD 30 MIN: CPT | Performed by: PHYSICIAN ASSISTANT

## 2025-07-22 PROCEDURE — 95251 CONT GLUC MNTR ANALYSIS I&R: CPT | Performed by: PHYSICIAN ASSISTANT

## 2025-07-22 RX ORDER — INSULIN LISPRO 100 [IU]/ML
INJECTION, SOLUTION INTRAVENOUS; SUBCUTANEOUS
Qty: 60 ML | Refills: 1 | Status: SHIPPED | OUTPATIENT
Start: 2025-07-22

## 2025-07-22 RX ORDER — TIRZEPATIDE 7.5 MG/.5ML
7.5 INJECTION, SOLUTION SUBCUTANEOUS WEEKLY
Qty: 6 ML | Refills: 1 | Status: SHIPPED | OUTPATIENT
Start: 2025-07-22

## 2025-07-22 NOTE — PROGRESS NOTES
Name: Mattie Joy      : 1960      MRN: [id number]  Encounter Provider: Lobito Alfredo PA-C  Encounter Date: 2025   Encounter department: [facility]    No chief complaint on file.  :  Assessment & Plan  Type 2 diabetes mellitus with hyperglycemia, with long-term current use of insulin (HCC)  A1c completed prior to today's office visit.  Glucose levels are running high likely due to prednisone.  Looks like there was issues with getting Mounjaro, and at this time were not sure if this is covered by insurance or not.  My plan at this time okay would be to increase her Humalog to 30 units of breakfast, 25 units at lunch, and 25 units with dinner and continue with Lantus 40 units daily.  Okay I will try to get Mounjaro covered again and will start at 7.5 mg weekly.  Continue utilizing Dexcom to monitor glucose levels.  Contact the office with any concerns or questions.  Follow-up in 3 months with labwork completed prior to visit.  Lab Results   Component Value Date    HGBA1C 8.8 (A) 2025       Orders:  •  Comprehensive metabolic panel; Future  •  Hemoglobin A1C; Future  •  Lipid panel; Future  •  Tirzepatide (Mounjaro) 7.5 MG/0.5ML SOAJ; Inject 7.5 mg under the skin once a week    Hypercholesterolemia  Previous lipid panel was excellent.  At this time she will continue with atorvastatin 40 mg daily.  Repeat lipid panel prior to next office visit.       Type 2 diabetes mellitus with stage 2 chronic kidney disease, with long-term current use of insulin (HCC)    Lab Results   Component Value Date    HGBA1C 8.8 (A) 2025       Orders:  •  insulin lispro (HumaLOG KwikPen) 100 units/mL injection pen; INJECT 30 SUBCUTANEOUSLY IN THE MORNING, AND 25 UNITS AT LUNCH AND 25 UNITS AT SUPPER      History of Present Illness     Mattie Joy is a 64 y.o. female with type 2 diabetes, insulin requiring for 8 year, hyperlipidemia for follow-up.   She was  placed on insulin in June 2021 when her hemoglobin A1c went up markedly.  She reportedly did try Januvia and Jardiance in the past but had problems with side effects on these medications. She is on oral agents and insulin at home and takes Lantus insulin 42 units at bedtime, Humalog 25 units with breakfast and 20 units with lunch and dinner, and Farxiga 10 mg daily.  Her Ozempic was discontinued after last office visit.  Insurance was no longer covering that medication and she did not necessarily want to switch at that time and went to see how well she can do without the medication.  Previously on metformin.  She admits to polyuria, polydipsia, polyphagia, nocturia 3-4 times a night, and blurry vision.  She has unusual sensations of her feet with painful sensations of her feet.  She denies chest pain but has shortness of breath with her lung disease.  She is on oxygen 24 hours a day. She denies retinopathy, heart attack, stroke and claudication but does admit to neuropathy and nephropathy.  Is doing well today.  Was hospitalized from July 13 through July 17, 2025 for acute chronic exacerbation of COPD.  States she is doing better at this time, but is currently on prednisone which is causing her glucose levels to consistently run high.  Prior to being on prednisone and in the hospital was not having any major issues with her glucose levels.  We tried to get Mounjaro covered after last office visit, but unclear if this was denied or not.  Would be willing to try Trulicity or Rybelsus.     Has followed up with [facility] November 2021.     Hypoglycemic episodes: No never.  H/o of hypoglycemia causing hospitalization or intervention such as glucagon injection  or ambulance call  No.  Hypoglycemia symptoms: never had one.  Treatment of hypoglycemia: would eat something sweet or sugar.  Glucagon:No.  Medic alert tag: recommended,Yes.      The patient's last eye exam was at May 2024.  The patient's last foot exam  was in with podiatry, Dr. Blake Malik.  She last saw Podiatry several weeks ago and does every 3 months.  Last diabetic foot exam completed in the office was August 2024 at PCPs office.     Blood Sugar/Glucometer/Pump/CGM review: Downloaded Freestyle jennifer from July 9 through July 22, 2025 reveals an average glucose level of 312 with a standard deviation of 79.  She is in target range 4% time, and above target range 96% of the time.  Glucose levels typically trend down overnight, lowest typically between 4 AM and 7 AM before increases with breakfast and then remains high throughout the day until trending down overnight.     She has hyperlipidemia and takes atorvastatin 40 mg daily.  She denies chest pain but has rare palpitations.     Current regimen:   Lantus 40 units daily, Humalog 25 units with breakfast, 20 units with lunch and dinner.    Review of Systems   Constitutional:  Positive for fatigue. Negative for activity change, appetite change and unexpected weight change.   HENT:  Negative for sore throat and trouble swallowing.    Eyes:  Negative for visual disturbance.   Respiratory:  Positive for shortness of breath (On portable oxygen). Negative for chest tightness.    Cardiovascular:  Negative for chest pain, palpitations and leg swelling.   Gastrointestinal:  Negative for abdominal pain, constipation, diarrhea, nausea and vomiting.   Endocrine: Positive for polydipsia, polyphagia and polyuria. Negative for cold intolerance and heat intolerance.   Genitourinary:  Negative for frequency.        Nocturia   Musculoskeletal:  Positive for gait problem (Utilizes wheelchair).   Skin:  Negative for wound.   Neurological:  Positive for numbness ( bilateral feet). Negative for dizziness, weakness and headaches.   Psychiatric/Behavioral:  Positive for dysphoric mood and sleep disturbance. The patient is not nervous/anxious.     as per HPI    Medical History Reviewed by provider this encounter:     .  Medications  "Ordered Prior to Encounter[1]   Social History[1]     Medical History Reviewed by provider this encounter:     .    Objective   /60   Pulse 104   Ht 5' 3\" (1.6 m)   SpO2 93%   BMI 48.01 kg/m²      Body mass index is 48.01 kg/m².  Wt Readings from Last 3 Encounters:   07/14/25 123 kg (271 lb)   06/03/25 122 kg (268 lb)   05/20/25 122 kg (268 lb 12.8 oz)     Physical Exam  Vitals and nursing note reviewed.   Constitutional:       General: She is not in acute distress.     Appearance: Normal appearance. She is well-developed. She is not diaphoretic.     Eyes:      General: No scleral icterus.     Extraocular Movements: Extraocular movements intact.      Conjunctiva/sclera: Conjunctivae normal.      Pupils: Pupils are equal, round, and reactive to light.       Cardiovascular:      Rate and Rhythm: Normal rate and regular rhythm.      Pulses: Normal pulses.      Heart sounds: Normal heart sounds. No murmur heard.     No friction rub. No gallop.   Pulmonary:      Effort: Pulmonary effort is normal. No tachypnea, bradypnea or respiratory distress.      Breath sounds: Normal breath sounds. Decreased air movement present. No wheezing.      Comments:  on portable oxygen    Musculoskeletal:      Cervical back: Normal range of motion.      Right lower leg: No edema.      Left lower leg: No edema.   Lymphadenopathy:      Cervical: No cervical adenopathy.     Skin:     General: Skin is warm and dry.     Neurological:      Mental Status: She is alert and oriented to person, place, and time. Mental status is at baseline. She is not disoriented.      Motor: No abnormal muscle tone.      Gait: Gait abnormal (Utilizing wheelchair).      Deep Tendon Reflexes: Reflexes are normal and symmetric.     Psychiatric:         Mood and Affect: Mood normal.         Behavior: Behavior normal.         Thought Content: Thought content normal.       Last Eye Exam: 01/14/2025  Last Foot Exam: 07/03/2025  Health Maintenance   Topic Date Due "   • Diabetic Eye Exam  01/14/2026   • Diabetic Foot Exam  07/03/2026         Labs: I have reviewed pertinent labs including:   Lab Results   Component Value Date    HGBA1C 8.8 (A) 04/22/2025    HGBA1C 8.5 (H) 09/10/2024    HGBA1C 7.7 (A) 06/17/2024      Lab Results   Component Value Date    CREATININE 0.75 07/17/2025    CREATININE 0.85 07/16/2025    CREATININE 0.77 07/15/2025    BUN 33 (H) 07/17/2025     09/22/2017    K 4.4 07/17/2025    CL 88 (L) 07/17/2025    CO2 35 (H) 07/17/2025      eGFR   Date Value Ref Range Status   07/17/2025 83 ml/min/1.73sq m Final      Cholesterol   Date Value Ref Range Status   09/22/2017 150 100 - 199 mg/dL Final     HDL   Date Value Ref Range Status   09/10/2024 42 >39 mg/dL Final     Triglycerides   Date Value Ref Range Status   09/10/2024 75 0 - 149 mg/dL Final     T. Chol/HDL Ratio   Date Value Ref Range Status   09/10/2024 2.1 0.0 - 4.4 ratio Final     Comment:                                       T. Chol/HDL Ratio                                              Men  Women                                1/2 Avg.Risk  3.4    3.3                                    Avg.Risk  5.0    4.4                                 2X Avg.Risk  9.6    7.1                                 3X Avg.Risk 23.4   11.0        ALT   Date Value Ref Range Status   07/13/2025 16 7 - 52 U/L Final     Comment:     Specimen collection should occur prior to Sulfasalazine administration due to the potential for falsely depressed results.    09/10/2024 15 0 - 32 IU/L Final     AST   Date Value Ref Range Status   07/13/2025 21 13 - 39 U/L Final   09/10/2024 16 0 - 40 IU/L Final     Alkaline Phosphatase   Date Value Ref Range Status   07/13/2025 114 (H) 34 - 104 U/L Final   09/22/2017 199 (H) 39 - 117 IU/L Final     Total Bilirubin   Date Value Ref Range Status   09/22/2017 0.4 0.0 - 1.2 mg/dL Final      Lab Results   Component Value Date    CDI0FVBJYTEX 2.131 03/08/2024      Lab Results   Component Value Date     FREET4 1.12 01/22/2024        Patient Instructions   Monitor diet.  Make sure to drink plenty water throughout the day.     Due to insurance coverage, will start Mounjaro 5 mg weekly.     Continue Lantus    Increase Humalog to 30 units with breakfast, and 25 units with lunch and dinner.     Call in blood sugars in about 2 weeks so we can make adjustments.     Contact the office with any concerns or questions.     Follow-up in 3 months with lab work completed prior to visit.    Discussed with the patient and all questioned fully answered. She will call me if any problems arise.         [1]  Current Outpatient Medications on File Prior to Visit   Medication Sig Dispense Refill   • acetaminophen (TYLENOL) 650 mg CR tablet Take 650 mg by mouth every 8 (eight) hours as needed for mild pain     • albuterol (2.5 mg/3 mL) 0.083 % nebulizer solution Take 3 mL (2.5 mg total) by nebulization every 6 (six) hours as needed for wheezing or shortness of breath 1080 mL 3   • albuterol (PROVENTIL HFA,VENTOLIN HFA) 90 mcg/act inhaler Inhale 2 puffs every 4 (four) hours as needed for wheezing 8.5 g 5   • apixaban (Eliquis) 5 mg TAKE ONE TABLET BY MOUTH TWICE DAILY 180 tablet 1   • arformoterol (BROVANA) 15 mcg/2 mL nebulizer solution Take 2 mL (15 mcg total) by nebulization 2 (two) times a day 60 mL 3   • atorvastatin (LIPITOR) 40 mg tablet Take 1 tablet (40 mg total) by mouth daily 90 tablet 3   • Blood Glucose Monitoring Suppl (Contour Next One) AILYN USE AS DIRECTED 3 TIMES A DAY 1 each 0   • bumetanide (BUMEX) 2 mg tablet Take 3 tablets (6 mg total) by mouth 2 (two) times a day 540 tablet 3   • [employer] Lancets Thin 26G MISC USE 3 (THREE) TIMES A  each 5   • dapagliflozin (Farxiga) 10 MG tablet TAKE ONE TABLET BY MOUTH DAILY 90 tablet 1   • diltiazem (CARDIZEM CD) 120 mg 24 hr capsule TAKE ONE CAPSULE BY MOUTH DAILY 90 capsule 1   • DULoxetine (CYMBALTA) 60 mg delayed release capsule TAKE ONE CAPSULE BY MOUTH DAILY 30 capsule 5    • Easy Comfort Pen Needles 33G X 4 MM MISC Use 4 times a day 400 each 3   • Insulin Glargine Solostar (Lantus SoloStar) 100 UNIT/ML SOPN Inject 40 units daily at bedtime 45 mL 3   • Insulin Pen Needle (BD Pen Needle Love 2nd Gen) 32G X 4 MM MISC Use daily at bedtime Use 4 new needles daily . 400 each 3   • loratadine (CLARITIN) 10 mg tablet Take 1 tablet (10 mg total) by mouth daily as needed for allergies 30 tablet 0   • metoprolol succinate (TOPROL-XL) 100 mg 24 hr tablet Take 1 tablet (100 mg total) by mouth daily 90 tablet 1   • montelukast (SINGULAIR) 10 mg tablet TAKE ONE TABLET BY MOUTH AT BEDTIME 90 tablet 1   • naloxone (NARCAN) 4 mg/0.1 mL nasal spray Administer 1 spray into a nostril. If breathing does not return to normal or if breathing difficulty resumes after 2-3 minutes, give another dose in the other nostril using a new spray. 1 each 1   • omeprazole (PriLOSEC) 40 MG capsule Take 1 capsule (40 mg total) by mouth daily 90 capsule 1   • predniSONE 10 mg tablet Take 4 tablets (40 mg total) by mouth daily for 3 days, THEN 3 tablets (30 mg total) daily for 3 days, THEN 2 tablets (20 mg total) daily for 3 days, THEN 1 tablet (10 mg total) daily for 3 days. 30 tablet 0   • pregabalin (LYRICA) 150 mg capsule Take 1 capsule by mouth in the morning     • Revefenacin (Yupelri) 175 MCG/3ML SOLN Take 3 mL (175 mcg total) by nebulization in the morning 90 mL 3   • spironolactone (ALDACTONE) 50 mg tablet Take 2 tablets (100 mg total) by mouth daily 180 tablet 0   • traZODone (DESYREL) 150 mg tablet TAKE ONE TABLET BY MOUTH AT BEDTIME 30 tablet 5   • Vitamin D, Cholecalciferol, 50 MCG (2000 UT) CAPS Take 2,000 Int'l Units/day by mouth in the morning 90 capsule 3   • [DISCONTINUED] insulin lispro (HumaLOG KwikPen) 100 units/mL injection pen INJECT 25 SUBCUTANEOUSLY IN THE MORNING, AND 20 UNITS AT LUNCH AND 20 UNITS AT SUPPER 60 mL 1   • EPINEPHrine (EPIPEN) 0.3 mg/0.3 mL SOAJ Inject 0.3 mL (0.3 mg total) into a  muscle once for 1 dose 0.6 mL 0   • [DISCONTINUED] Tirzepatide (Mounjaro) 7.5 MG/0.5ML SOAJ Inject 7.5 mg under the skin once a week (Patient not taking: Reported on 2025) 6 mL 1     No current facility-administered medications on file prior to visit.   [1]  Social History  Tobacco Use   • Smoking status: Former     Current packs/day: 0.00     Average packs/day: 0.3 packs/day for 29.9 years (7.5 ttl pk-yrs)     Types: Cigarettes     Start date:      Quit date: 12/10/2019     Years since quittin.6   • Smokeless tobacco: Never   Vaping Use   • Vaping status: Never Used   Substance and Sexual Activity   • Alcohol use: Not Currently     Alcohol/week: 20.0 standard drinks of alcohol     Types: 20 Cans of beer per week     Comment: about 6 coors light every night, stopped in 2019   • Drug use: No   • Sexual activity: Not Currently

## 2025-07-22 NOTE — PATIENT INSTRUCTIONS
Monitor diet.  Make sure to drink plenty water throughout the day.     Due to insurance coverage, will start Mounjaro 5 mg weekly.     Continue Lantus    Increase Humalog to 30 units with breakfast, and 25 units with lunch and dinner.     Call in blood sugars in about 2 weeks so we can make adjustments.     Contact the office with any concerns or questions.     Follow-up in 3 months with lab work completed prior to visit.

## 2025-07-22 NOTE — ASSESSMENT & PLAN NOTE
A1c completed prior to today's office visit.  Glucose levels are running high likely due to prednisone.  Looks like there was issues with getting Mounjaro, and at this time were not sure if this is covered by insurance or not.  My plan at this time okay would be to increase her Humalog to 30 units of breakfast, 25 units at lunch, and 25 units with dinner and continue with Lantus 40 units daily.  Okay I will try to get Mounjaro covered again and will start at 7.5 mg weekly.  Continue utilizing Dexcom to monitor glucose levels.  Contact the office with any concerns or questions.  Follow-up in 3 months with labwork completed prior to visit.  Lab Results   Component Value Date    HGBA1C 8.8 (A) 04/22/2025       Orders:  •  Comprehensive metabolic panel; Future  •  Hemoglobin A1C; Future  •  Lipid panel; Future  •  Tirzepatide (Mounjaro) 7.5 MG/0.5ML SOAJ; Inject 7.5 mg under the skin once a week

## 2025-07-22 NOTE — ASSESSMENT & PLAN NOTE
Lab Results   Component Value Date    HGBA1C 8.8 (A) 04/22/2025       Orders:  •  insulin lispro (HumaLOG KwikPen) 100 units/mL injection pen; INJECT 30 SUBCUTANEOUSLY IN THE MORNING, AND 25 UNITS AT LUNCH AND 25 UNITS AT SUPPER

## 2025-07-22 NOTE — ASSESSMENT & PLAN NOTE
Previous lipid panel was excellent.  At this time she will continue with atorvastatin 40 mg daily.  Repeat lipid panel prior to next office visit.

## 2025-07-22 NOTE — TELEPHONE ENCOUNTER
PA for (Mounjaro) 7.5 MG SUBMITTED to [facility]    via    [x]Faxed to plan [phone]  []PA sent as URGENT    All office notes, labs and other pertaining documents and studies sent. Clinical questions answered. Awaiting determination from insurance company.     Turnaround time for your insurance to make a decision on your Prior Authorization can take 7-21 business days.

## 2025-08-05 ENCOUNTER — OFFICE VISIT (OUTPATIENT)
Dept: FAMILY MEDICINE CLINIC | Facility: HOSPITAL | Age: 65
End: 2025-08-05
Payer: MEDICARE

## 2025-08-05 VITALS — OXYGEN SATURATION: 92 % | SYSTOLIC BLOOD PRESSURE: 98 MMHG | HEART RATE: 99 BPM | DIASTOLIC BLOOD PRESSURE: 56 MMHG

## 2025-08-05 DIAGNOSIS — J96.21 ACUTE ON CHRONIC RESPIRATORY FAILURE WITH HYPOXIA AND HYPERCAPNIA (HCC): ICD-10-CM

## 2025-08-05 DIAGNOSIS — Z09 HOSPITAL DISCHARGE FOLLOW-UP: Primary | ICD-10-CM

## 2025-08-05 DIAGNOSIS — E11.42 DIABETIC POLYNEUROPATHY ASSOCIATED WITH TYPE 2 DIABETES MELLITUS (HCC): ICD-10-CM

## 2025-08-05 DIAGNOSIS — F32.1 CURRENT MODERATE EPISODE OF MAJOR DEPRESSIVE DISORDER WITHOUT PRIOR EPISODE (HCC): ICD-10-CM

## 2025-08-05 DIAGNOSIS — J44.89 ASTHMA-COPD OVERLAP SYNDROME (HCC): ICD-10-CM

## 2025-08-05 DIAGNOSIS — J96.22 ACUTE ON CHRONIC RESPIRATORY FAILURE WITH HYPOXIA AND HYPERCAPNIA (HCC): ICD-10-CM

## 2025-08-05 DIAGNOSIS — I50.32 CHRONIC DIASTOLIC HEART FAILURE (HCC): ICD-10-CM

## 2025-08-05 PROCEDURE — 99215 OFFICE O/P EST HI 40 MIN: CPT | Performed by: FAMILY MEDICINE

## 2025-08-05 PROCEDURE — G2211 COMPLEX E/M VISIT ADD ON: HCPCS | Performed by: FAMILY MEDICINE

## 2025-08-13 PROBLEM — A41.9 SEPSIS (HCC): Status: RESOLVED | Noted: 2020-05-29 | Resolved: 2025-08-13

## 2025-08-14 ENCOUNTER — TELEPHONE (OUTPATIENT)
Age: 65
End: 2025-08-14

## 2025-08-14 ENCOUNTER — OFFICE VISIT (OUTPATIENT)
Age: 65
End: 2025-08-14
Attending: INTERNAL MEDICINE
Payer: MEDICARE

## 2025-08-18 ENCOUNTER — CLINICAL SUPPORT (OUTPATIENT)
Age: 65
End: 2025-08-18
Payer: MEDICARE

## 2025-08-18 VITALS
OXYGEN SATURATION: 96 % | WEIGHT: 272 LBS | HEIGHT: 63 IN | DIASTOLIC BLOOD PRESSURE: 70 MMHG | BODY MASS INDEX: 48.2 KG/M2 | TEMPERATURE: 97.3 F | HEART RATE: 92 BPM | SYSTOLIC BLOOD PRESSURE: 110 MMHG

## 2025-08-18 DIAGNOSIS — J96.21 ACUTE ON CHRONIC RESPIRATORY FAILURE WITH HYPOXIA AND HYPERCAPNIA (HCC): Primary | ICD-10-CM

## 2025-08-18 DIAGNOSIS — J96.12 CHRONIC RESPIRATORY FAILURE WITH HYPOXIA AND HYPERCAPNIA (HCC): ICD-10-CM

## 2025-08-18 DIAGNOSIS — I10 ESSENTIAL HYPERTENSION: ICD-10-CM

## 2025-08-18 DIAGNOSIS — K21.9 GASTROESOPHAGEAL REFLUX DISEASE WITHOUT ESOPHAGITIS: ICD-10-CM

## 2025-08-18 DIAGNOSIS — J96.22 ACUTE ON CHRONIC RESPIRATORY FAILURE WITH HYPOXIA AND HYPERCAPNIA (HCC): Primary | ICD-10-CM

## 2025-08-18 DIAGNOSIS — I50.32 CHRONIC DIASTOLIC CHF (CONGESTIVE HEART FAILURE) (HCC): ICD-10-CM

## 2025-08-18 DIAGNOSIS — J96.11 CHRONIC RESPIRATORY FAILURE WITH HYPOXIA AND HYPERCAPNIA (HCC): ICD-10-CM

## 2025-08-18 PROCEDURE — 94618 PULMONARY STRESS TESTING: CPT

## 2025-08-20 RX ORDER — OMEPRAZOLE 40 MG/1
40 CAPSULE, DELAYED RELEASE ORAL DAILY
Qty: 90 CAPSULE | Refills: 1 | Status: SHIPPED | OUTPATIENT
Start: 2025-08-20

## 2025-08-20 RX ORDER — METOPROLOL SUCCINATE 100 MG/1
100 TABLET, EXTENDED RELEASE ORAL DAILY
Qty: 90 TABLET | Refills: 1 | Status: SHIPPED | OUTPATIENT
Start: 2025-08-20

## 2025-08-20 RX ORDER — SPIRONOLACTONE 50 MG/1
100 TABLET, FILM COATED ORAL DAILY
Qty: 180 TABLET | Refills: 1 | Status: SHIPPED | OUTPATIENT
Start: 2025-08-20

## 2025-08-21 DIAGNOSIS — I50.32 CHRONIC DIASTOLIC CHF (CONGESTIVE HEART FAILURE) (HCC): ICD-10-CM

## 2025-08-22 RX ORDER — BUMETANIDE 2 MG/1
6 TABLET ORAL 2 TIMES DAILY
Qty: 540 TABLET | Refills: 0 | Status: SHIPPED | OUTPATIENT
Start: 2025-08-22

## (undated) DEVICE — GLOVE INDICATOR PI UNDERGLOVE SZ 8 BLUE

## (undated) DEVICE — SINGLE-USE BIOPSY FORCEPS: Brand: RADIAL JAW 4

## (undated) DEVICE — STERI DRAPE 1000 NON-STERILE ROLL

## (undated) DEVICE — CHLORAPREP HI-LITE 26ML ORANGE

## (undated) DEVICE — INTENDED FOR TISSUE SEPARATION, AND OTHER PROCEDURES THAT REQUIRE A SHARP SURGICAL BLADE TO PUNCTURE OR CUT.: Brand: BARD-PARKER SAFETY BLADES SIZE 15, STERILE

## (undated) DEVICE — SUT PROLENE 4-0 PC-3 18 IN 8634G

## (undated) DEVICE — GLOVE INDICATOR PI UNDERGLOVE SZ 7.5 BLUE

## (undated) DEVICE — 1200CC GUARDIAN II: Brand: GUARDIAN

## (undated) DEVICE — BITE BLOCK ADULT 11FR OMNI BLOC

## (undated) DEVICE — TUBING SUCTION 5MM X 12 FT

## (undated) DEVICE — PACK PLASTIC HAND PBDS

## (undated) DEVICE — TRAP SPEC SUCT 4 CAPTURE CHMBR LF

## (undated) DEVICE — SINGLE-USE POLYPECTOMY SNARE: Brand: SENSATION SHORT THROW

## (undated) DEVICE — SYRINGE 30ML LL

## (undated) DEVICE — GLOVE INDICATOR PI UNDERGLOVE SZ 7 BLUE

## (undated) DEVICE — SYRINGE 50ML LL

## (undated) DEVICE — SPONGE PVP SCRUB WING STERILE